# Patient Record
Sex: MALE | Race: WHITE | NOT HISPANIC OR LATINO | Employment: OTHER | URBAN - METROPOLITAN AREA
[De-identification: names, ages, dates, MRNs, and addresses within clinical notes are randomized per-mention and may not be internally consistent; named-entity substitution may affect disease eponyms.]

---

## 2018-10-24 ENCOUNTER — TELEPHONE (OUTPATIENT)
Dept: TRANSPLANT | Facility: CLINIC | Age: 53
End: 2018-10-24

## 2018-10-24 NOTE — TELEPHONE ENCOUNTER
Referral received from Richar Rosas    Patient with Alcohol cirrhosis.  MELD 33  Referred for liver transplant for EVALUATION.    Referral completed and forwarded to [facility].          Insurance:   PRIMARY: [employer]   ID:[id number]  Contact #     SECONDARY:   ID:  Contact #

## 2018-10-26 ENCOUNTER — TELEPHONE (OUTPATIENT)
Dept: TRANSPLANT | Facility: CLINIC | Age: 53
End: 2018-10-26

## 2018-10-26 NOTE — TELEPHONE ENCOUNTER
Called wife to schedule pt for his appointment on Monday 10/29. Per wife Suzy, she spoke with [facility]lon boyer and they need 5 days to arrange a flight. I questioned if he was too sick to drive. Per wife the drive is  18 hrs and he is weak. She feeds him twice a day and he has freq bowel movements daily.  I informed her if he is that sick we did not want her to drive 18 hrs with him.  I rescheduled the pt for  11/5 with Dr Sharma to allow time for wife to arrange [place] flight.  I spoke to the wife of pt possibly being admitted at home if he is too sick and the possibility of a hospital to hospital transfer if insurance will cover. She stated she too felt he should be in the hospital. I encouraged her to bring him to the hospital and I would hold the appt for 11/5 and he could come by marlon boyer then if not by hospital to hospital transfer before.  I spoke with Dr Sharma, informed him of pt status, he agreed that pt should be admitted and they could call for a transfer.    I called the wife back and informed her Dr Sharma stated if pt waited for medical care, he may be too sick to transplanted and again encouraged her to bring him to the hospital.The wife asked if they arrived, could they go to the ER. I informed her yes, they could, but again we are advising her not to drive 18 hours with him. She stated she is speaking to her daughters and they may have some resources.

## 2018-10-27 ENCOUNTER — HOSPITAL ENCOUNTER (INPATIENT)
Facility: HOSPITAL | Age: 53
LOS: 73 days | Discharge: NURSING, REHAB OR LONG-TERM CARE | DRG: 005 | End: 2019-01-08
Attending: EMERGENCY MEDICINE | Admitting: HOSPITALIST
Payer: COMMERCIAL

## 2018-10-27 DIAGNOSIS — K72.00 ACUTE LIVER FAILURE: ICD-10-CM

## 2018-10-27 DIAGNOSIS — R41.0 ACUTE DELIRIUM: ICD-10-CM

## 2018-10-27 DIAGNOSIS — K72.00 ACUTE LIVER FAILURE WITHOUT HEPATIC COMA: ICD-10-CM

## 2018-10-27 DIAGNOSIS — R07.89 OTHER CHEST PAIN: ICD-10-CM

## 2018-10-27 DIAGNOSIS — N17.0 ACUTE RENAL FAILURE WITH ACUTE TUBULAR NECROSIS SUPERIMPOSED ON STAGE 3 CHRONIC KIDNEY DISEASE: ICD-10-CM

## 2018-10-27 DIAGNOSIS — Z99.11 ENCOUNTER FOR WEANING FROM VENTILATOR: ICD-10-CM

## 2018-10-27 DIAGNOSIS — M89.9 CHRONIC KIDNEY DISEASE-MINERAL AND BONE DISORDER: ICD-10-CM

## 2018-10-27 DIAGNOSIS — R00.0 SINUS TACHYCARDIA: ICD-10-CM

## 2018-10-27 DIAGNOSIS — F10.21 ALCOHOL USE DISORDER, SEVERE, IN EARLY REMISSION: ICD-10-CM

## 2018-10-27 DIAGNOSIS — T38.0X5A ADRENAL CORTICAL STEROIDS CAUSING ADVERSE EFFECT IN THERAPEUTIC USE: ICD-10-CM

## 2018-10-27 DIAGNOSIS — E43 SEVERE MALNUTRITION: ICD-10-CM

## 2018-10-27 DIAGNOSIS — Z74.09 IMPAIRED FUNCTIONAL MOBILITY AND ENDURANCE: ICD-10-CM

## 2018-10-27 DIAGNOSIS — F10.20 SEVERE ALCOHOL DEPENDENCE: ICD-10-CM

## 2018-10-27 DIAGNOSIS — R07.9 CHEST PAIN, UNSPECIFIED TYPE: ICD-10-CM

## 2018-10-27 DIAGNOSIS — E44.0 MODERATE MALNUTRITION: ICD-10-CM

## 2018-10-27 DIAGNOSIS — I77.1 STENOSIS OF HEPATIC ARTERY OF TRANSPLANTED LIVER: ICD-10-CM

## 2018-10-27 DIAGNOSIS — E87.6 HYPOKALEMIA: ICD-10-CM

## 2018-10-27 DIAGNOSIS — R41.0 DELIRIUM: ICD-10-CM

## 2018-10-27 DIAGNOSIS — E87.5 HYPERKALEMIA: ICD-10-CM

## 2018-10-27 DIAGNOSIS — K72.10 END STAGE LIVER DISEASE: ICD-10-CM

## 2018-10-27 DIAGNOSIS — E83.42 HYPOMAGNESEMIA: ICD-10-CM

## 2018-10-27 DIAGNOSIS — N17.9 ACUTE RENAL FAILURE, UNSPECIFIED ACUTE RENAL FAILURE TYPE: ICD-10-CM

## 2018-10-27 DIAGNOSIS — Z91.89 AT RISK FOR OPPORTUNISTIC INFECTIONS: ICD-10-CM

## 2018-10-27 DIAGNOSIS — N17.0 ACUTE KIDNEY FAILURE WITH LESION OF TUBULAR NECROSIS: ICD-10-CM

## 2018-10-27 DIAGNOSIS — E87.20 METABOLIC ACIDOSIS: ICD-10-CM

## 2018-10-27 DIAGNOSIS — E83.39 HYPOPHOSPHATEMIA: ICD-10-CM

## 2018-10-27 DIAGNOSIS — N18.9 CHRONIC KIDNEY DISEASE-MINERAL AND BONE DISORDER: ICD-10-CM

## 2018-10-27 DIAGNOSIS — R17 JAUNDICE: ICD-10-CM

## 2018-10-27 DIAGNOSIS — A41.9 SEPSIS: ICD-10-CM

## 2018-10-27 DIAGNOSIS — D68.9 COAGULOPATHY: ICD-10-CM

## 2018-10-27 DIAGNOSIS — F91.9 BEHAVIOR DISTURBANCE: ICD-10-CM

## 2018-10-27 DIAGNOSIS — N17.9 AKI (ACUTE KIDNEY INJURY): ICD-10-CM

## 2018-10-27 DIAGNOSIS — Z01.818 ENCOUNTER FOR PRE-TRANSPLANT EVALUATION FOR CHRONIC LIVER DISEASE: ICD-10-CM

## 2018-10-27 DIAGNOSIS — K76.82 HEPATIC ENCEPHALOPATHY: ICD-10-CM

## 2018-10-27 DIAGNOSIS — E44.0 MODERATE PROTEIN MALNUTRITION: ICD-10-CM

## 2018-10-27 DIAGNOSIS — R11.0 NAUSEA: ICD-10-CM

## 2018-10-27 DIAGNOSIS — R06.00 DYSPNEA, UNSPECIFIED TYPE: ICD-10-CM

## 2018-10-27 DIAGNOSIS — N17.0 ATN (ACUTE TUBULAR NECROSIS): ICD-10-CM

## 2018-10-27 DIAGNOSIS — N18.30 ACUTE RENAL FAILURE WITH ACUTE TUBULAR NECROSIS SUPERIMPOSED ON STAGE 3 CHRONIC KIDNEY DISEASE: ICD-10-CM

## 2018-10-27 DIAGNOSIS — R53.1 WEAKNESS: ICD-10-CM

## 2018-10-27 DIAGNOSIS — N30.00 ACUTE CYSTITIS WITHOUT HEMATURIA: ICD-10-CM

## 2018-10-27 DIAGNOSIS — Z29.89 PROPHYLACTIC IMMUNOTHERAPY: ICD-10-CM

## 2018-10-27 DIAGNOSIS — R53.81 DEBILITY: ICD-10-CM

## 2018-10-27 DIAGNOSIS — R11.2 NON-INTRACTABLE VOMITING WITH NAUSEA, UNSPECIFIED VOMITING TYPE: ICD-10-CM

## 2018-10-27 DIAGNOSIS — R21 ACUTE MACULOPAPULAR RASH: ICD-10-CM

## 2018-10-27 DIAGNOSIS — R60.1 ANASARCA: ICD-10-CM

## 2018-10-27 DIAGNOSIS — F41.9 ANXIETY: ICD-10-CM

## 2018-10-27 DIAGNOSIS — K76.9 PLEURAL EFFUSION ASSOCIATED WITH HEPATIC DISORDER: ICD-10-CM

## 2018-10-27 DIAGNOSIS — E68 SEQUELAE OF HYPERALIMENTATION: ICD-10-CM

## 2018-10-27 DIAGNOSIS — R73.9 ACUTE HYPERGLYCEMIA: ICD-10-CM

## 2018-10-27 DIAGNOSIS — D72.829 LEUKOCYTOSIS, UNSPECIFIED TYPE: ICD-10-CM

## 2018-10-27 DIAGNOSIS — R07.9 CHEST PAIN: ICD-10-CM

## 2018-10-27 DIAGNOSIS — R94.31 PROLONGED Q-T INTERVAL ON ECG: ICD-10-CM

## 2018-10-27 DIAGNOSIS — E11.9 TYPE 2 DIABETES MELLITUS WITHOUT COMPLICATION: ICD-10-CM

## 2018-10-27 DIAGNOSIS — D63.8 ANEMIA OF CHRONIC DISEASE: ICD-10-CM

## 2018-10-27 DIAGNOSIS — E87.1 HYPONATREMIA: ICD-10-CM

## 2018-10-27 DIAGNOSIS — E11.9 TYPE 2 DIABETES MELLITUS WITHOUT COMPLICATION, WITHOUT LONG-TERM CURRENT USE OF INSULIN: ICD-10-CM

## 2018-10-27 DIAGNOSIS — F10.21 ALCOHOL USE DISORDER, SEVERE, IN EARLY REMISSION, DEPENDENCE: ICD-10-CM

## 2018-10-27 DIAGNOSIS — E83.9 CHRONIC KIDNEY DISEASE-MINERAL AND BONE DISORDER: ICD-10-CM

## 2018-10-27 DIAGNOSIS — R13.19 OTHER DYSPHAGIA: ICD-10-CM

## 2018-10-27 DIAGNOSIS — K65.1 INTRA-ABDOMINAL ABSCESS: ICD-10-CM

## 2018-10-27 DIAGNOSIS — K70.11 ALCOHOLIC HEPATITIS WITH ASCITES: ICD-10-CM

## 2018-10-27 DIAGNOSIS — T86.49 STENOSIS OF HEPATIC ARTERY OF TRANSPLANTED LIVER: ICD-10-CM

## 2018-10-27 DIAGNOSIS — K72.90 DECOMPENSATED HEPATIC CIRRHOSIS: ICD-10-CM

## 2018-10-27 DIAGNOSIS — R63.0 DECREASED APPETITE: ICD-10-CM

## 2018-10-27 DIAGNOSIS — E66.3 OVERWEIGHT (BMI 25.0-29.9): ICD-10-CM

## 2018-10-27 DIAGNOSIS — I35.9 NONRHEUMATIC AORTIC VALVE DISORDER: ICD-10-CM

## 2018-10-27 DIAGNOSIS — K76.7 HEPATORENAL SYNDROME: ICD-10-CM

## 2018-10-27 DIAGNOSIS — Z79.60 LONG-TERM USE OF IMMUNOSUPPRESSANT MEDICATION: ICD-10-CM

## 2018-10-27 DIAGNOSIS — K74.60 DECOMPENSATED HEPATIC CIRRHOSIS: ICD-10-CM

## 2018-10-27 DIAGNOSIS — J91.8 PLEURAL EFFUSION ASSOCIATED WITH HEPATIC DISORDER: ICD-10-CM

## 2018-10-27 DIAGNOSIS — Z01.818 PRE-TRANSPLANT EVALUATION FOR LIVER TRANSPLANT: ICD-10-CM

## 2018-10-27 DIAGNOSIS — G93.40 ACUTE ENCEPHALOPATHY: ICD-10-CM

## 2018-10-27 DIAGNOSIS — Z94.4 S/P LIVER TRANSPLANT: Primary | ICD-10-CM

## 2018-10-27 DIAGNOSIS — D69.6 THROMBOCYTOPENIA: ICD-10-CM

## 2018-10-27 LAB
ALBUMIN SERPL BCP-MCNC: 1.9 G/DL
ALP SERPL-CCNC: 255 U/L
ALT SERPL W/O P-5'-P-CCNC: 84 U/L
AMPHET+METHAMPHET UR QL: NEGATIVE
ANION GAP SERPL CALC-SCNC: 13 MMOL/L
AST SERPL-CCNC: 107 U/L
BACTERIA #/AREA URNS AUTO: ABNORMAL /HPF
BARBITURATES UR QL SCN>200 NG/ML: NEGATIVE
BASOPHILS # BLD AUTO: 0.07 K/UL
BASOPHILS NFR BLD: 0.4 %
BENZODIAZ UR QL SCN>200 NG/ML: NORMAL
BILIRUB SERPL-MCNC: 40.9 MG/DL
BILIRUB UR QL STRIP: ABNORMAL
BUN SERPL-MCNC: 113 MG/DL
BZE UR QL SCN: NEGATIVE
CALCIUM SERPL-MCNC: 8 MG/DL
CANNABINOIDS UR QL SCN: NEGATIVE
CERULOPLASMIN SERPL-MCNC: 31 MG/DL
CHLORIDE SERPL-SCNC: 104 MMOL/L
CLARITY UR REFRACT.AUTO: ABNORMAL
CO2 SERPL-SCNC: 11 MMOL/L
COLOR UR AUTO: ABNORMAL
CREAT SERPL-MCNC: 6 MG/DL
CREAT UR-MCNC: 117 MG/DL
DIFFERENTIAL METHOD: ABNORMAL
EOSINOPHIL # BLD AUTO: 0.3 K/UL
EOSINOPHIL NFR BLD: 1.8 %
EOSINOPHIL URNS QL WRIGHT STN: NORMAL
ERYTHROCYTE [DISTWIDTH] IN BLOOD BY AUTOMATED COUNT: 19.3 %
EST. GFR  (AFRICAN AMERICAN): 11.3 ML/MIN/1.73 M^2
EST. GFR  (NON AFRICAN AMERICAN): 9.8 ML/MIN/1.73 M^2
ESTIMATED AVG GLUCOSE: 91 MG/DL
FERRITIN SERPL-MCNC: 1044 NG/ML
FIBRINOGEN PPP-MCNC: 256 MG/DL
FOLATE SERPL-MCNC: 16.4 NG/ML
GLUCOSE SERPL-MCNC: 149 MG/DL
GLUCOSE UR QL STRIP: NEGATIVE
HAPTOGLOB SERPL-MCNC: 61 MG/DL
HBA1C MFR BLD HPLC: 4.8 %
HCT VFR BLD AUTO: 40.1 %
HGB BLD-MCNC: 13.4 G/DL
HGB UR QL STRIP: NEGATIVE
HYALINE CASTS UR QL AUTO: 1 /LPF
IGG SERPL-MCNC: 2065 MG/DL
IMM GRANULOCYTES # BLD AUTO: 0.2 K/UL
IMM GRANULOCYTES NFR BLD AUTO: 1.3 %
INR PPP: 2.2
IRON SERPL-MCNC: 101 UG/DL
KETONES UR QL STRIP: NEGATIVE
LACTATE SERPL-SCNC: 1.4 MMOL/L
LDH SERPL L TO P-CCNC: 394 U/L
LEUKOCYTE ESTERASE UR QL STRIP: ABNORMAL
LYMPHOCYTES # BLD AUTO: 0.7 K/UL
LYMPHOCYTES NFR BLD: 4.2 %
MAGNESIUM SERPL-MCNC: 2.6 MG/DL
MCH RBC QN AUTO: 33.3 PG
MCHC RBC AUTO-ENTMCNC: 33.4 G/DL
MCV RBC AUTO: 100 FL
METHADONE UR QL SCN>300 NG/ML: NEGATIVE
MICROSCOPIC COMMENT: ABNORMAL
MONOCYTES # BLD AUTO: 1.4 K/UL
MONOCYTES NFR BLD: 9.1 %
NEUTROPHILS # BLD AUTO: 13.2 K/UL
NEUTROPHILS NFR BLD: 83.2 %
NITRITE UR QL STRIP: NEGATIVE
NRBC BLD-RTO: 0 /100 WBC
OPIATES UR QL SCN: NEGATIVE
OSMOLALITY SERPL: 320 MOSM/KG
OSMOLALITY UR: 363 MOSM/KG
PCP UR QL SCN>25 NG/ML: NEGATIVE
PH UR STRIP: 5 [PH] (ref 5–8)
PLATELET # BLD AUTO: 123 K/UL
PMV BLD AUTO: 11.8 FL
POCT GLUCOSE: 129 MG/DL (ref 70–110)
POCT GLUCOSE: 133 MG/DL (ref 70–110)
POTASSIUM SERPL-SCNC: 4.6 MMOL/L
PREALB SERPL-MCNC: 7 MG/DL
PROCALCITONIN SERPL IA-MCNC: 2.7 NG/ML
PROT SERPL-MCNC: 6.2 G/DL
PROT UR QL STRIP: NEGATIVE
PROT UR-MCNC: 31 MG/DL
PROT/CREAT UR: 0.26 MG/G{CREAT}
PROTHROMBIN TIME: 21.4 SEC
RBC # BLD AUTO: 4.03 M/UL
RBC #/AREA URNS AUTO: 1 /HPF (ref 0–4)
SATURATED IRON: 103 %
SODIUM SERPL-SCNC: 128 MMOL/L
SODIUM UR-SCNC: <20 MMOL/L
SODIUM UR-SCNC: <20 MMOL/L
SP GR UR STRIP: 1.01 (ref 1–1.03)
TOTAL IRON BINDING CAPACITY: 98 UG/DL
TOXICOLOGY INFORMATION: NORMAL
TRANSFERRIN SERPL-MCNC: 66 MG/DL
URN SPEC COLLECT METH UR: ABNORMAL
UUN UR-MCNC: 533 MG/DL
VIT B12 SERPL-MCNC: >2000 PG/ML
WBC # BLD AUTO: 15.8 K/UL
WBC #/AREA URNS AUTO: 22 /HPF (ref 0–5)

## 2018-10-27 PROCEDURE — 63600175 PHARM REV CODE 636 W HCPCS: Mod: NTX | Performed by: HOSPITALIST

## 2018-10-27 PROCEDURE — 85025 COMPLETE CBC W/AUTO DIFF WBC: CPT | Mod: NTX

## 2018-10-27 PROCEDURE — 25000003 PHARM REV CODE 250: Mod: NTX | Performed by: INTERNAL MEDICINE

## 2018-10-27 PROCEDURE — 99223 1ST HOSP IP/OBS HIGH 75: CPT | Mod: NTX,,, | Performed by: INTERNAL MEDICINE

## 2018-10-27 PROCEDURE — 80074 ACUTE HEPATITIS PANEL: CPT | Mod: NTX

## 2018-10-27 PROCEDURE — 96365 THER/PROPH/DIAG IV INF INIT: CPT | Mod: NTX

## 2018-10-27 PROCEDURE — 85610 PROTHROMBIN TIME: CPT | Mod: NTX

## 2018-10-27 PROCEDURE — 82728 ASSAY OF FERRITIN: CPT | Mod: NTX

## 2018-10-27 PROCEDURE — 84145 PROCALCITONIN (PCT): CPT | Mod: NTX

## 2018-10-27 PROCEDURE — 82607 VITAMIN B-12: CPT | Mod: NTX

## 2018-10-27 PROCEDURE — 25000242 PHARM REV CODE 250 ALT 637 W/ HCPCS: Mod: NTX | Performed by: HOSPITALIST

## 2018-10-27 PROCEDURE — 87086 URINE CULTURE/COLONY COUNT: CPT | Mod: NTX

## 2018-10-27 PROCEDURE — 63600175 PHARM REV CODE 636 W HCPCS: Mod: JG,NTX | Performed by: HOSPITALIST

## 2018-10-27 PROCEDURE — 93005 ELECTROCARDIOGRAM TRACING: CPT | Mod: NTX

## 2018-10-27 PROCEDURE — 87077 CULTURE AEROBIC IDENTIFY: CPT | Mod: NTX

## 2018-10-27 PROCEDURE — 83735 ASSAY OF MAGNESIUM: CPT | Mod: NTX

## 2018-10-27 PROCEDURE — 25000003 PHARM REV CODE 250: Mod: NTX | Performed by: HOSPITALIST

## 2018-10-27 PROCEDURE — 99223 PR INITIAL HOSPITAL CARE,LEVL III: ICD-10-PCS | Mod: NTX,,, | Performed by: INTERNAL MEDICINE

## 2018-10-27 PROCEDURE — 99285 EMERGENCY DEPT VISIT HI MDM: CPT | Mod: 25,NTX

## 2018-10-27 PROCEDURE — 87186 SC STD MICRODIL/AGAR DIL: CPT | Mod: NTX

## 2018-10-27 PROCEDURE — 20600001 HC STEP DOWN PRIVATE ROOM: Mod: NTX

## 2018-10-27 PROCEDURE — 99284 EMERGENCY DEPT VISIT MOD MDM: CPT | Mod: ,,, | Performed by: EMERGENCY MEDICINE

## 2018-10-27 PROCEDURE — 87088 URINE BACTERIA CULTURE: CPT | Mod: NTX

## 2018-10-27 PROCEDURE — 25000003 PHARM REV CODE 250: Mod: NTX | Performed by: NURSE PRACTITIONER

## 2018-10-27 PROCEDURE — 82390 ASSAY OF CERULOPLASMIN: CPT | Mod: NTX

## 2018-10-27 PROCEDURE — 83036 HEMOGLOBIN GLYCOSYLATED A1C: CPT | Mod: NTX

## 2018-10-27 PROCEDURE — 83010 ASSAY OF HAPTOGLOBIN QUANT: CPT | Mod: NTX

## 2018-10-27 PROCEDURE — P9047 ALBUMIN (HUMAN), 25%, 50ML: HCPCS | Mod: JG,NTX | Performed by: HOSPITALIST

## 2018-10-27 PROCEDURE — 83935 ASSAY OF URINE OSMOLALITY: CPT | Mod: NTX

## 2018-10-27 PROCEDURE — 82570 ASSAY OF URINE CREATININE: CPT | Mod: NTX

## 2018-10-27 PROCEDURE — 81001 URINALYSIS AUTO W/SCOPE: CPT | Mod: NTX

## 2018-10-27 PROCEDURE — 83540 ASSAY OF IRON: CPT | Mod: NTX

## 2018-10-27 PROCEDURE — 84540 ASSAY OF URINE/UREA-N: CPT | Mod: NTX

## 2018-10-27 PROCEDURE — 84300 ASSAY OF URINE SODIUM: CPT | Mod: NTX

## 2018-10-27 PROCEDURE — 80307 DRUG TEST PRSMV CHEM ANLYZR: CPT | Mod: NTX

## 2018-10-27 PROCEDURE — 96375 TX/PRO/DX INJ NEW DRUG ADDON: CPT | Mod: NTX

## 2018-10-27 PROCEDURE — 99284 PR EMERGENCY DEPT VISIT,LEVEL IV: ICD-10-PCS | Mod: ,,, | Performed by: EMERGENCY MEDICINE

## 2018-10-27 PROCEDURE — 87205 SMEAR GRAM STAIN: CPT | Mod: NTX

## 2018-10-27 PROCEDURE — 83615 LACTATE (LD) (LDH) ENZYME: CPT | Mod: NTX

## 2018-10-27 PROCEDURE — 99223 1ST HOSP IP/OBS HIGH 75: CPT | Mod: NTX,,, | Performed by: HOSPITALIST

## 2018-10-27 PROCEDURE — 87040 BLOOD CULTURE FOR BACTERIA: CPT | Mod: 59,NTX

## 2018-10-27 PROCEDURE — 84134 ASSAY OF PREALBUMIN: CPT | Mod: NTX

## 2018-10-27 PROCEDURE — 82746 ASSAY OF FOLIC ACID SERUM: CPT | Mod: NTX

## 2018-10-27 PROCEDURE — 80321 ALCOHOLS BIOMARKERS 1OR 2: CPT | Mod: NTX

## 2018-10-27 PROCEDURE — 86038 ANTINUCLEAR ANTIBODIES: CPT | Mod: NTX

## 2018-10-27 PROCEDURE — 86256 FLUORESCENT ANTIBODY TITER: CPT | Mod: NTX

## 2018-10-27 PROCEDURE — 80053 COMPREHEN METABOLIC PANEL: CPT | Mod: NTX

## 2018-10-27 PROCEDURE — 83930 ASSAY OF BLOOD OSMOLALITY: CPT | Mod: NTX

## 2018-10-27 PROCEDURE — 99223 PR INITIAL HOSPITAL CARE,LEVL III: ICD-10-PCS | Mod: NTX,,, | Performed by: HOSPITALIST

## 2018-10-27 PROCEDURE — 83605 ASSAY OF LACTIC ACID: CPT | Mod: NTX

## 2018-10-27 PROCEDURE — 85384 FIBRINOGEN ACTIVITY: CPT | Mod: NTX

## 2018-10-27 PROCEDURE — 82784 ASSAY IGA/IGD/IGG/IGM EACH: CPT | Mod: NTX

## 2018-10-27 PROCEDURE — 94640 AIRWAY INHALATION TREATMENT: CPT | Mod: NTX

## 2018-10-27 RX ORDER — FOLIC ACID 1 MG/1
1 TABLET ORAL DAILY
Status: DISCONTINUED | OUTPATIENT
Start: 2018-10-28 | End: 2018-11-11

## 2018-10-27 RX ORDER — LIDOCAINE 50 MG/G
1 PATCH TOPICAL
Status: DISCONTINUED | OUTPATIENT
Start: 2018-10-27 | End: 2018-11-11

## 2018-10-27 RX ORDER — IBUPROFEN 200 MG
16 TABLET ORAL
Status: DISCONTINUED | OUTPATIENT
Start: 2018-10-27 | End: 2018-11-11

## 2018-10-27 RX ORDER — IPRATROPIUM BROMIDE AND ALBUTEROL SULFATE 2.5; .5 MG/3ML; MG/3ML
3 SOLUTION RESPIRATORY (INHALATION)
Status: DISCONTINUED | OUTPATIENT
Start: 2018-10-27 | End: 2018-11-05

## 2018-10-27 RX ORDER — ONDANSETRON 8 MG/1
8 TABLET, ORALLY DISINTEGRATING ORAL EVERY 6 HOURS PRN
Status: DISCONTINUED | OUTPATIENT
Start: 2018-10-27 | End: 2018-11-11

## 2018-10-27 RX ORDER — TRAMADOL HYDROCHLORIDE 50 MG/1
50 TABLET ORAL EVERY 8 HOURS PRN
Status: DISCONTINUED | OUTPATIENT
Start: 2018-10-27 | End: 2018-11-11

## 2018-10-27 RX ORDER — IBUPROFEN 200 MG
24 TABLET ORAL
Status: DISCONTINUED | OUTPATIENT
Start: 2018-10-27 | End: 2018-10-27

## 2018-10-27 RX ORDER — SODIUM BICARBONATE 650 MG/1
1300 TABLET ORAL 3 TIMES DAILY
Status: DISCONTINUED | OUTPATIENT
Start: 2018-10-27 | End: 2018-11-08

## 2018-10-27 RX ORDER — LACTULOSE 10 G/15ML
20 SOLUTION ORAL EVERY 6 HOURS
Status: DISCONTINUED | OUTPATIENT
Start: 2018-10-27 | End: 2018-10-28

## 2018-10-27 RX ORDER — CEFEPIME HYDROCHLORIDE 2 G/1
2 INJECTION, POWDER, FOR SOLUTION INTRAVENOUS
Status: DISCONTINUED | OUTPATIENT
Start: 2018-10-27 | End: 2018-10-30

## 2018-10-27 RX ORDER — OCTREOTIDE ACETATE 100 UG/ML
100 INJECTION, SOLUTION INTRAVENOUS; SUBCUTANEOUS EVERY 8 HOURS
Status: DISCONTINUED | OUTPATIENT
Start: 2018-10-27 | End: 2018-10-30

## 2018-10-27 RX ORDER — ACETAMINOPHEN 325 MG/1
650 TABLET ORAL EVERY 4 HOURS PRN
Status: DISCONTINUED | OUTPATIENT
Start: 2018-10-27 | End: 2018-11-11

## 2018-10-27 RX ORDER — SODIUM CHLORIDE 9 MG/ML
INJECTION, SOLUTION INTRAVENOUS CONTINUOUS
Status: ACTIVE | OUTPATIENT
Start: 2018-10-27 | End: 2018-10-28

## 2018-10-27 RX ORDER — IBUPROFEN 200 MG
16 TABLET ORAL
Status: DISCONTINUED | OUTPATIENT
Start: 2018-10-27 | End: 2018-10-27

## 2018-10-27 RX ORDER — PANTOPRAZOLE SODIUM 40 MG/1
40 TABLET, DELAYED RELEASE ORAL DAILY
Status: DISCONTINUED | OUTPATIENT
Start: 2018-10-28 | End: 2018-11-11

## 2018-10-27 RX ORDER — GLUCAGON 1 MG
1 KIT INJECTION
Status: DISCONTINUED | OUTPATIENT
Start: 2018-10-27 | End: 2018-10-27

## 2018-10-27 RX ORDER — SODIUM CHLORIDE 0.9 % (FLUSH) 0.9 %
5 SYRINGE (ML) INJECTION
Status: DISCONTINUED | OUTPATIENT
Start: 2018-10-27 | End: 2018-11-11

## 2018-10-27 RX ORDER — GLUCAGON 1 MG
1 KIT INJECTION
Status: DISCONTINUED | OUTPATIENT
Start: 2018-10-27 | End: 2018-11-11

## 2018-10-27 RX ORDER — MIDODRINE HYDROCHLORIDE 5 MG/1
10 TABLET ORAL 3 TIMES DAILY
Status: DISCONTINUED | OUTPATIENT
Start: 2018-10-27 | End: 2018-11-01

## 2018-10-27 RX ORDER — VANCOMYCIN HCL IN 5 % DEXTROSE 1G/250ML
1000 PLASTIC BAG, INJECTION (ML) INTRAVENOUS ONCE
Status: COMPLETED | OUTPATIENT
Start: 2018-10-27 | End: 2018-10-27

## 2018-10-27 RX ORDER — THIAMINE HCL 100 MG
100 TABLET ORAL DAILY
Status: DISCONTINUED | OUTPATIENT
Start: 2018-10-27 | End: 2018-11-11

## 2018-10-27 RX ORDER — INSULIN ASPART 100 [IU]/ML
0-5 INJECTION, SOLUTION INTRAVENOUS; SUBCUTANEOUS
Status: DISCONTINUED | OUTPATIENT
Start: 2018-10-27 | End: 2018-11-11

## 2018-10-27 RX ORDER — ALBUMIN HUMAN 250 G/1000ML
25 SOLUTION INTRAVENOUS EVERY 6 HOURS
Status: COMPLETED | OUTPATIENT
Start: 2018-10-27 | End: 2018-10-28

## 2018-10-27 RX ORDER — IBUPROFEN 200 MG
24 TABLET ORAL
Status: DISCONTINUED | OUTPATIENT
Start: 2018-10-27 | End: 2018-11-11

## 2018-10-27 RX ADMIN — PHYTONADIONE 10 MG: 10 INJECTION, EMULSION INTRAMUSCULAR; INTRAVENOUS; SUBCUTANEOUS at 06:10

## 2018-10-27 RX ADMIN — SODIUM BICARBONATE 650 MG TABLET 1300 MG: at 09:10

## 2018-10-27 RX ADMIN — SODIUM CHLORIDE 1000 ML: 0.9 INJECTION, SOLUTION INTRAVENOUS at 05:10

## 2018-10-27 RX ADMIN — OCTREOTIDE ACETATE 100 MCG: 100 INJECTION, SOLUTION INTRAVENOUS; SUBCUTANEOUS at 05:10

## 2018-10-27 RX ADMIN — LIDOCAINE 1 PATCH: 50 PATCH CUTANEOUS at 09:10

## 2018-10-27 RX ADMIN — MIDODRINE HYDROCHLORIDE 10 MG: 5 TABLET ORAL at 04:10

## 2018-10-27 RX ADMIN — ALBUMIN HUMAN 25 G: 0.25 SOLUTION INTRAVENOUS at 05:10

## 2018-10-27 RX ADMIN — CEFEPIME 2 G: 2 INJECTION, POWDER, FOR SOLUTION INTRAVENOUS at 05:10

## 2018-10-27 RX ADMIN — SODIUM BICARBONATE 650 MG TABLET 1300 MG: at 05:10

## 2018-10-27 RX ADMIN — PROMETHAZINE HYDROCHLORIDE 12.5 MG: 25 INJECTION INTRAMUSCULAR; INTRAVENOUS at 10:10

## 2018-10-27 RX ADMIN — LACTULOSE 20 G: 20 SOLUTION ORAL at 05:10

## 2018-10-27 RX ADMIN — SODIUM CHLORIDE: 0.9 INJECTION, SOLUTION INTRAVENOUS at 09:10

## 2018-10-27 RX ADMIN — IPRATROPIUM BROMIDE AND ALBUTEROL SULFATE 3 ML: .5; 3 SOLUTION RESPIRATORY (INHALATION) at 07:10

## 2018-10-27 RX ADMIN — Medication 100 MG: at 06:10

## 2018-10-27 RX ADMIN — OCTREOTIDE ACETATE 100 MCG: 100 INJECTION, SOLUTION INTRAVENOUS; SUBCUTANEOUS at 09:10

## 2018-10-27 RX ADMIN — MIDODRINE HYDROCHLORIDE 10 MG: 5 TABLET ORAL at 10:10

## 2018-10-27 RX ADMIN — RIFAXIMIN 550 MG: 550 TABLET ORAL at 09:10

## 2018-10-27 RX ADMIN — VANCOMYCIN HYDROCHLORIDE 1000 MG: 1 INJECTION, POWDER, LYOPHILIZED, FOR SOLUTION INTRAVENOUS at 06:10

## 2018-10-27 NOTE — ED TRIAGE NOTES
Pt. Arrived to ED with CC of ongoing N/V, and generalized body aches since 9/30/18. Denies CP and SOB    Patient identifiers verified and correct for Femi Enciso.    LOC: The patient is awake, alert and oriented x 4. Pt is speaking appropriately, no slurred speech.  APPEARANCE: Patient resting comfortably and in no acute distress. Pt is clean and well groomed. No JVD visible. Pt reports pain level of 10/10.  SKIN: Skin is warm dry and intact, an jaundice due to liver problem.  No tenting observed and capillary refill <3 seconds. No clubbing noted to nail beds. No breakdown or brusing visible and mucus membranes moist and acyanotic.  MUSCULOSKELETAL: Full range of motion present in all extremities. Hand  equal and leg strength strong +5 bilaterally.  RESPIRATORY: Airway is open and patent. Respirations-unlabored, regular rate, equal bilaterally on inspiration and expiration. No accessory muscle use noted. Lungs clear to auscultation in all fields bilaterally anterior and posterior.   CARDIAC: Patient has regular heart rate and rhythm.  No peripheral edema noted, and patient has no c/o chest pain.  ABDOMEN: Soft and non-tender to palpation with no distention noted. Normoactive bowel sounds X4 quadrants. Pt has no complaints of abnormal bowel movements. Pt reports normal appetite.   NEUROLOGIC: Eyes open spontaneously and facial expression symmetrical. Pt behavior appropriate to situation, and pt follows commands.  Pt reports sensation present in all extremities when touched with a finger.. PERRLA  : No complaints of frequency, burning, urgency or blood in the urine.

## 2018-10-27 NOTE — CONSULTS
[facility]  Nephrology  Consult Note     Patient Name: Femi Enciso  MRN: [id number]  Admission Date: 10/27/2018  Hospital Length of Stay: 0 days  Attending Provider: Hai Wylie MD   Primary Care Physician: Primary Doctor No  Principal Problem:Acute liver failure without hepatic coma     Subjective:      HPI: 54 y/o man with DM2 presents to the ED with family for liver failure (likely due to EtOH abundant history of drinking - diagnosed in Sept 2018 in [place]).  He reports jaundice, generalized weakness, nausea, diarrhea, and decreased appetite since Sept 2018.  He is from [place], [place], and Dr. Sharma (patients physician) recommended bringing him to hospital for evaluation.  Patient denies any fever, chills, vomiting, chest pain, palpitations, SOB, abdominal pain.       Nephrology consulted for evaluation/management Adri.           Past Medical History:   Diagnosis Date    Liver cirrhosis, alcoholic 09/30/2018         No past surgical history on file.          Review of patient's allergies indicates:   Allergen Reactions    Penicillins Nausea And Vomiting and Rash           Current Facility-Administered Medications   Medication Frequency    acetaminophen tablet 650 mg Q4H PRN    albumin human 25% bottle 25 g Q6H    ceFEPIme injection 2 g Q24H    dextrose 50% injection 12.5 g PRN    dextrose 50% injection 25 g PRN    glucagon (human recombinant) injection 1 mg PRN    glucose chewable tablet 16 g PRN    glucose chewable tablet 24 g PRN    lactulose 20 gram/30 mL solution Soln 20 g Q6H    midodrine tablet 10 mg TID    octreotide injection 100 mcg Q8H    phytonadione vitamin k (AQUA-MEPHYTON) 10 mg in dextrose 5 % 50 mL IVPB Daily    promethazine (PHENERGAN) 6.25 mg in dextrose 5 % 50 mL IVPB Q6H PRN    sodium bicarbonate tablet 1,300 mg TID    sodium chloride 0.9% flush 5 mL PRN    vancomycin in dextrose 5 % 1 gram/250 mL IVPB 1,000 mg Once      No current outpatient medications on  file.          Family History      None               Tobacco Use    Smoking status: Not on file   Substance and Sexual Activity    Alcohol use: Not on file    Drug use: Not on file    Sexual activity: Not on file      Review of Systems   Constitutional: Negative for chills, fever. Positive for fatigue.  HENT: Negative for sore throat, trouble swallowing.    Eyes: Negative for photophobia, visual disturbance.   Respiratory: Negative for cough, shortness of breath.    Cardiovascular: Negative for chest pain, palpitations, leg swelling.   Gastrointestinal: Negative for abdominal pain, constipation, vomiting. Positive for nausea and diarrhea.  Endocrine: Negative for cold intolerance, heat intolerance.   Genitourinary: Negative for dysuria, frequency.   Musculoskeletal: Negative for arthralgias, myalgias.   Skin: Negative for rash, wound, erythema   Neurological: Negative for dizziness, syncope, weakness, light-headedness.   Psychiatric/Behavioral: Negative for confusion, hallucinations, anxiety  All other systems reviewed and are negative.        Objective:      Vital Signs (Most Recent):  Temp: 97.2 °F (36.2 °C) (10/27/18 1639)  Pulse: 72 (10/27/18 1530)  Resp: 17 (10/27/18 1530)  BP: 111/66 (10/27/18 1530)  SpO2: 95 % (10/27/18 1530) Vital Signs (24h Range):  Temp:  [97.2 °F (36.2 °C)-97.4 °F (36.3 °C)] 97.2 °F (36.2 °C)  Pulse:  [71-72] 72  Resp:  [17-18] 17  SpO2:  [95 %-98 %] 95 %  BP: ()/(53-67) 111/66      Weight: 79.4 kg (175 lb) (10/27/18 1353)  Body mass index is 25.11 kg/m².  Body surface area is 1.98 meters squared.     No intake/output data recorded.     Physical Exam  Constitutional: appears weak and ill   Head: Normocephalic and atraumatic.   Mouth/Throat: Oropharynx is clear and moist.   Eyes: EOM are normal. Pupils are equal, round, and reactive to light. positive scleral icterus.   Neck: Normal range of motion. Neck supple.   Cardiovascular: Normal rate and regular rhythm.  No murmur  heard.  Pulmonary/Chest: Effort normal and breath sounds normal. No respiratory distress. No wheezes, rales, or rhonchi  Abdominal: Soft. Bowel sounds are normal.  No distension or tenderness  Musculoskeletal: Normal range of motion. No edema.   Neurological: Alert and oriented to person, place, and time.   Skin: Skin is warm and dry.   Psychiatric: Normal mood and affect. Behavior is normal.   Nurse notes and vitals reviewed.     Significant Labs:  CBC:       Recent Labs   Lab 10/27/18  1457   WBC 15.80*   RBC 4.03*   HGB 13.4*   HCT 40.1   *   *   MCH 33.3*   MCHC 33.4      CMP:       Recent Labs   Lab 10/27/18  1457   *   CALCIUM 8.0*   ALBUMIN 1.9*   PROT 6.2   *   K 4.6   CO2 11*      *   CREATININE 6.0*   ALKPHOS 255*   ALT 84*   *   BILITOT 40.9*      All labs within the past 24 hours have been reviewed.     Significant Imaging:  CXR personally reviewed.     Assessment/Plan:          DEL (acute kidney injury)     Patient with elevated creatinine (unknown baseline), multiple etiologies including Hepatorenal syndrome (in setting of hyponetremia) and low to normal blood pressures.  Also patient presents with history of diarrhea (volume depleted state) which could cause ischemic ATN.     Plan:  - Please consider IV replenish 0.9%NSS to run by IV pump at 100 mL/hr for 500 mL and reassess (for volume expansion)  - Can consider Albumin admin 25% q 6 hrs x 4 doses and reassess in setting of low albumin  - U/A, UPCr, Renal US  - Will perform Urine Sediment for further analysis in AM  - Strict I/O and chart  - Avoid nephrotoxic medications  - Maintain MAP >65  - Hb > 7 gm/dL  - Medication doses adjusted to GFR               Thank you for your consult. I will follow-up with patient. Please contact us if you have any additional questions.     Charanjit Swann MD  Nephrology  [facility]    Staff attestation    I have personally examined patient,  reviewed his available medical chart, including serial labs, imaging studies, outside labs, pertinent medical chart. I agree with history and physical, assessment and plan and findings documented by Dr. العلي with my additional comments/ clarification as follows.    I obtained information from pt's daughter who was at the bedside. Patient has diabetes with prior noted A1C of up to 13, hypertension. He has longstanding ETOH abuse. He has been sick for a little over a month now and his illness appeared somewhat abruptly after eating outside food. He had several days of nausea, vomiting and was admitted to a local hospital at [place]. Per care everywhere he had creatinine between 0.9 to 2.01 while he was there and was 2.01 on 10/22/18 when he left the hospital. He also had multiple electrolyte abnormalities. He has been diagnosed with decompensated liver failure due to cirrhosis. His total bilirubin has been in the range of 35 to 40's. He has been hypotensive for the past several days per his family. He is making some urine but less than usual. Also he has encephalopathy. He has arrived at [facility] for liver transplant evaluation.     Nephrology team has been consulted for DEL. His labs here since admission noted for creatinine of 6.0 and today 6.3 with BUN of 113 at the time of presentation to BUN of 123 today. He has severe hyperphosphatemia. He also has severely low serum bicarbonate, he does not have a blood gas but suspect metabolic acidosis.     He received IVF overnight but has had only 350 cc urine last night and none reported today.     US kidneys noted for mild cortical thinning.    On exam, he is in altered mental status, somnolent, has asterixis, very ill appearing and jaundiced man. He has trace edema. CXR shows moderate right sided pleural effusion.    Impression  DEL due to toxic ATN/ ischemic ATN  Suspect underlying CKD due to hypertension and previously poorly controlled diabetes although labs prior  to 10/1/18 not available for me to review. But US does show some cortical thinning.  Abnormal urinalysis, rule out UTI  Hyperphosphatemia  Decompensated liver failure  Hyponatremia  Right pleural effusion  Metabolic acidosis    - very ill for floor stay and for dialysis on the floor. He has significant burden of uremic toxins. He needs very gentle clearance of uremic toxins. I had a detailed discussion of this with his daughter and wife at his bedside. He has toxic and ischemic ATN, although HRS is still a possibility there is still a large component of ATN.   - recommend starting SLED for gentle metabolic toxin clearance  - details of SLED discussed with house staff, 6 to 8 hours initially with labs every 2-3 hours to avoid over correction of hyponatremia and uremic toxins   - consider ABG  - gradually build time of SLED for uremic toxin and volume clearance  - work up of liver failure in progress

## 2018-10-27 NOTE — H&P
History and Physical  Hospital Medicine       Patient Name: Femi Enciso  MRN:  [id number]  Hospital Medicine Team: [facility] MED SHARRON Fitch MD  Date of Admission:  10/27/2018     Principal Problem:  Acute liver failure without hepatic coma   Primary Care Physician: Primary Doctor No      History of Present Illness:     Mr. Femi Enciso is a 53 y.o. male with PMH of alcohol hepatitis, DM2 and depression who flew here from [place] today for acute liver failure and for liver transplant evaluation.  Patient's wife states that he was in fair health until the end of September when they were on vacation.  She states that after eating a sand which, patient became very ill and started having diarrhea and N/V for several days.  Eventually that cleared up, however patient became jaundice and went to go see his GI doctor and was found to have elevated LFTs and patient was hypoxic and was admitted to their local hospital.  Patient had a large right sided effusion which was 1L was drained and negative for cancer, however was treated for a PNA.  Patient had a non contrasted abdominal CT at the time which showed multiple nodules which they thought was possibly cancer at the time.  Over the next few weeks, patient's LFTs continued to rise and patient began losing weight and started having episodes of confusion.  Patient was re-admitted this past week and Cr began to rise.  Patient was told to come here for a liver transplant evaluation.    In terms of his drinking, patient has been a binge drinker for quite some time now as per his wife.  His drink of choice is vodka and he would consume about a 5th a day.  Patient has failed multiple rehab attempts and has been to several AA meetings as well.  His longest period of sobriety was 3 years.  Patient is not currently in any withdrawals.  Patient has never had a DUI and is a retired .      Patient's wife and daughter are both RNs.      Review of Systems  "  Constitutional: Negative for chills, fever. 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   Respiratory: Negative for cough, shortness of breath.    Cardiovascular: Negative for chest pain, palpitations, leg swelling.   Gastrointestinal: Negative for abdominal pain, constipation, positive diarrhea, positive nausea, no vomiting.   Endocrine: Negative for cold intolerance, heat intolerance.   Genitourinary: Negative for dysuria, frequency.   Musculoskeletal: Negative for arthralgias, myalgias.   Skin: Negative forwound, erythema positive for color change (yellow)  Neurological: Negative for dizziness, syncope, weakness, light-headedness.   Psychiatric/Behavioral: Negative for hallucinations, anxiety positive for confusion  All other systems reviewed and are negative.      Past Medical History: Patient has a past medical history of Liver cirrhosis, alcoholic (09/30/2018).    Past Surgical History: Patient has no past surgical history on file.    Social History: Patient has a long history of ETOH abuse    Family History: HTN    Medications at home:     Allergies: Patient is allergic to penicillins.    Physical Exam:     Vital Signs (Most Recent):  Temp: 98 °F (36.7 °C) (10/27/18 1842)  Pulse: 76 (10/27/18 1955)  Resp: 18 (10/27/18 1955)  BP: 123/67 (10/27/18 1930)  SpO2: 98 % (10/27/18 1955) Vital Signs Range (Last 24H):  Temp:  [97.2 °F (36.2 °C)-98 °F (36.7 °C)]   Pulse:  [70-78]   Resp:  [16-19]   BP: ()/(53-88)   SpO2:  [95 %-100 %]    Body mass index is 25.11 kg/m².     Physical Exam:  Constitutional: appears weak and ill   Head: Normocephalic and atraumatic.   Mouth/Throat: Oropharynx is clear with dry mucous membranes  Eyes: EOM are normal. Pupils are equal, round, and reactive to light. positive scleral icterus.   Neck: Normal range of motion. Neck supple.   Cardiovascular: Normal " rate and regular rhythm.  Soft murmur  Pulmonary/Chest: Effort normal and decreased BS on right side; No respiratory distress. No wheezes, rales, or rhonchi  Abdominal: Soft. Bowel sounds are normal.  Large abdominal distention with ascites, though not tender  Musculoskeletal: Normal range of motion. No edema.   Neurological: Alert and oriented to person, place, and time, though slow mentation  Skin: Skin is warm and dry. jaundice   Psychiatric: Normal mood and affect. Behavior is normal.   Urologic: No boggs in place  Vitals reviewed.    ALL LABS AND RADIOGRAPHS REVIEWED:    LABS:  Recent Results (from the past 24 hour(s))   CBC auto differential    Collection Time: 10/27/18  2:57 PM   Result Value Ref Range    WBC 15.80 (H) 3.90 - 12.70 K/uL    RBC 4.03 (L) 4.60 - 6.20 M/uL    Hemoglobin 13.4 (L) 14.0 - 18.0 g/dL    Hematocrit 40.1 40.0 - 54.0 %     (H) 82 - 98 fL    MCH 33.3 (H) 27.0 - 31.0 pg    MCHC 33.4 32.0 - 36.0 g/dL    RDW 19.3 (H) 11.5 - 14.5 %    Platelets 123 (L) 150 - 350 K/uL    MPV 11.8 9.2 - 12.9 fL    Immature Granulocytes 1.3 (H) 0.0 - 0.5 %    Gran # (ANC) 13.2 (H) 1.8 - 7.7 K/uL    Immature Grans (Abs) 0.20 (H) 0.00 - 0.04 K/uL    Lymph # 0.7 (L) 1.0 - 4.8 K/uL    Mono # 1.4 (H) 0.3 - 1.0 K/uL    Eos # 0.3 0.0 - 0.5 K/uL    Baso # 0.07 0.00 - 0.20 K/uL    nRBC 0 0 /100 WBC    Gran% 83.2 (H) 38.0 - 73.0 %    Lymph% 4.2 (L) 18.0 - 48.0 %    Mono% 9.1 4.0 - 15.0 %    Eosinophil% 1.8 0.0 - 8.0 %    Basophil% 0.4 0.0 - 1.9 %    Differential Method Automated    Comprehensive metabolic panel    Collection Time: 10/27/18  2:57 PM   Result Value Ref Range    Sodium 128 (L) 136 - 145 mmol/L    Potassium 4.6 3.5 - 5.1 mmol/L    Chloride 104 95 - 110 mmol/L    CO2 11 (L) 23 - 29 mmol/L    Glucose 149 (H) 70 - 110 mg/dL    BUN, Bld 113 (H) 6 - 20 mg/dL    Creatinine 6.0 (H) 0.5 - 1.4 mg/dL    Calcium 8.0 (L) 8.7 - 10.5 mg/dL    Total Protein 6.2 6.0 - 8.4 g/dL    Albumin 1.9 (L) 3.5 - 5.2 g/dL     Total Bilirubin 40.9 (H) 0.1 - 1.0 mg/dL    Alkaline Phosphatase 255 (H) 55 - 135 U/L     (H) 10 - 40 U/L    ALT 84 (H) 10 - 44 U/L    Anion Gap 13 8 - 16 mmol/L    eGFR if African American 11.3 (A) >60 mL/min/1.73 m^2    eGFR if non  9.8 (A) >60 mL/min/1.73 m^2   Protime-INR    Collection Time: 10/27/18  2:57 PM   Result Value Ref Range    Prothrombin Time 21.4 (H) 9.0 - 12.5 sec    INR 2.2 (H) 0.8 - 1.2   Hemoglobin A1c    Collection Time: 10/27/18  2:57 PM   Result Value Ref Range    Hemoglobin A1C 4.8 4.0 - 5.6 %    Estimated Avg Glucose 91 68 - 131 mg/dL   Fibrinogen    Collection Time: 10/27/18  2:57 PM   Result Value Ref Range    Fibrinogen 256 182 - 366 mg/dL   Osmolality    Collection Time: 10/27/18  2:57 PM   Result Value Ref Range    Osmolality 320 (H) 280 - 300 mOsm/kg   Iron and TIBC    Collection Time: 10/27/18  2:57 PM   Result Value Ref Range    Iron 101 45 - 160 ug/dL    Transferrin 66 (L) 200 - 375 mg/dL    TIBC 98 (L) 250 - 450 ug/dL    Saturated Iron 103 (H) 20 - 50 %   Haptoglobin    Collection Time: 10/27/18  2:57 PM   Result Value Ref Range    Haptoglobin 61 30 - 250 mg/dL   Magnesium    Collection Time: 10/27/18  2:57 PM   Result Value Ref Range    Magnesium 2.6 1.6 - 2.6 mg/dL   IgG    Collection Time: 10/27/18  2:57 PM   Result Value Ref Range    IgG - Serum 2065 (H) 650 - 1600 mg/dL   Ferritin    Collection Time: 10/27/18  2:57 PM   Result Value Ref Range    Ferritin 1,044 (H) 20.0 - 300.0 ng/mL   Lactate dehydrogenase    Collection Time: 10/27/18  2:57 PM   Result Value Ref Range     (H) 110 - 260 U/L   Vitamin B12    Collection Time: 10/27/18  2:57 PM   Result Value Ref Range    Vitamin B-12 >2000 (H) 210 - 950 pg/mL   Ceruloplasmin    Collection Time: 10/27/18  2:57 PM   Result Value Ref Range    Ceruloplasmin 31.0 15.0 - 45.0 mg/dL   Procalcitonin    Collection Time: 10/27/18  2:57 PM   Result Value Ref Range    Procalcitonin 2.70 (H) <0.25 ng/mL    Prealbumin    Collection Time: 10/27/18  2:57 PM   Result Value Ref Range    Prealbumin 7 (L) 20 - 43 mg/dL   Folate    Collection Time: 10/27/18  2:57 PM   Result Value Ref Range    Folate 16.4 4.0 - 24.0 ng/mL   Urinalysis, Reflex to Urine Culture Urine, Clean Catch    Collection Time: 10/27/18  4:40 PM   Result Value Ref Range    Specimen UA Urine, Clean Catch     Color, UA Jackelyn Yellow, Straw, Jackelyn    Appearance, UA Cloudy (A) Clear    pH, UA 5.0 5.0 - 8.0    Specific Gravity, UA 1.010 1.005 - 1.030    Protein, UA Negative Negative    Glucose, UA Negative Negative    Ketones, UA Negative Negative    Bilirubin (UA) 2+ (A) Negative    Occult Blood UA Negative Negative    Nitrite, UA Negative Negative    Leukocytes, UA 1+ (A) Negative   Sodium, urine, random    Collection Time: 10/27/18  4:42 PM   Result Value Ref Range    Sodium Urine Random <20 (A) 20 - 250 mmol/L   Creatinine, urine, random    Collection Time: 10/27/18  4:42 PM   Result Value Ref Range    Creatinine, Random Ur 117.0 23.0 - 375.0 mg/dL   Protein / creatinine ratio, urine    Collection Time: 10/27/18  4:42 PM   Result Value Ref Range    Protein, Urine Random 31 (H) 0 - 15 mg/dL    Creatinine, Random Ur 117.0 23.0 - 375.0 mg/dL    Prot/Creat Ratio, Ur 0.26 (H) 0.00 - 0.20   Sodium, urine, random    Collection Time: 10/27/18  4:42 PM   Result Value Ref Range    Sodium Urine Random <20 (A) 20 - 250 mmol/L   Urea nitrogen, urine    Collection Time: 10/27/18  4:42 PM   Result Value Ref Range    Urine Urea Nitrogen, Random 533 140 - 1050 mg/dL   Osmolality, urine    Collection Time: 10/27/18  4:42 PM   Result Value Ref Range    Osmolality, Ur 363 50 - 1200 mOsm/kg   Urinalysis Microscopic    Collection Time: 10/27/18  4:42 PM   Result Value Ref Range    RBC, UA 1 0 - 4 /hpf    WBC, UA 22 (H) 0 - 5 /hpf    Bacteria, UA Many (A) None-Occ /hpf    Hyaline Casts, UA 1 0-1/lpf /lpf    Microscopic Comment SEE COMMENT    Drug screen panel, emergency     Collection Time: 10/27/18  4:42 PM   Result Value Ref Range    Benzodiazepines Presumptive Positive     Methadone metabolites Negative     Cocaine (Metab.) Negative     Opiate Scrn, Ur Negative     Barbiturate Screen, Ur Negative     Amphetamine Screen, Ur Negative     THC Negative     Phencyclidine Negative     Creatinine, Random Ur 117.0 23.0 - 375.0 mg/dL    Toxicology Information SEE COMMENT    Lactic acid, plasma    Collection Time: 10/27/18  4:43 PM   Result Value Ref Range    Lactate (Lactic Acid) 1.4 0.5 - 2.2 mmol/L   POCT glucose    Collection Time: 10/27/18  6:48 PM   Result Value Ref Range    POCT Glucose 129 (H) 70 - 110 mg/dL     RADIOGRAPHS:  XR CHEST PA AND LATERAL    CLINICAL HISTORY:  Weakness    TECHNIQUE:  PA and lateral views of the chest were performed.    COMPARISON:  None    FINDINGS:  Monitoring leads overlie the chest.  There is moderate-sized right pleural effusion with probable associated atelectasis/infiltrate.  Left hemithorax is clear.  No pneumothorax.  Cardiac silhouette is upper limits of normal in size without evidence of failure.  Mediastinal contours are within normal limits.  No acute osseous process seen.      Impression       Moderate-sized right pleural effusion with probable underlying atelectasis/infiltrate.  Short-term follow-up chest radiography after therapy recommended to resolution.      Electronically signed by: John Jurado MD  Date: 10/27/2018     US RETROPERITONEAL COMPLETE    CLINICAL HISTORY:  acute renal failure;    TECHNIQUE:  Ultrasound of the kidneys and urinary bladder was performed including color flow and Doppler evaluation of the kidneys.    COMPARISON:  None.    FINDINGS:  Right kidney: The right kidney measures 12.1 cm. There is mild cortical thinning.  No loss of corticomedullary distinction. Resistive index measures 1.0.  No mass. No renal stone. No hydronephrosis.    Left kidney: The left kidney measures 13.1 cm. There is mild cortical thinning.  No  loss of corticomedullary distinction. Resistive index measures 1.0.  No mass. No renal stone. No hydronephrosis.    Bladder: Partially distended at the time of scanning and has an unremarkable appearance.    Spleen: Splenic resistive index obtained for the purposes of comparison measures 0.69.    Miscellaneous: Small volume abdominal ascites.      Impression       Findings consistent with chronic medical renal disease, without hydronephrosis.    Small volume abdominal ascites.    Electronically signed by resident: Dmitri Cruz  Date: 10/27/2018         Assessment and Plan:       Active Hospital Problems    Diagnosis  POA    *Acute liver failure without hepatic coma [K72.00]  Yes    Alcoholic hepatitis with ascites [K70.11]  Yes    Acute liver failure [K72.00]  Yes    Jaundice [R17]  Yes    Hepatorenal syndrome [K76.7]  Yes    DEL (acute kidney injury) [N17.9]  Yes     Chronic    ATN (acute tubular necrosis) [N17.0]  Yes    Severe alcohol dependence [F10.20]  Yes    Coagulopathy [D68.9]  Yes    Anemia of chronic disease [D63.8]  Yes    Thrombocytopenia [D69.6]  Yes    Hyponatremia [E87.1]  Yes    Hepatic encephalopathy [K72.90]  Yes    Sepsis [A41.9]  Yes    Metabolic acidosis [E87.2]  Yes    Moderate protein malnutrition [E44.0]  Yes    Type 2 diabetes mellitus without complication [E11.9]  Yes    Acute cystitis without hematuria [N30.00]  Yes    Pleural effusion associated with hepatic disorder [K76.9, J91.8]  Yes      Resolved Hospital Problems   No resolved problems to display.     Acute liver failure without coma  Alcohol Hepatitis with ascites  MELD-Na score: 42 at 10/27/2018  2:57 PM  MELD score: 43 at 10/27/2018  2:57 PM  Calculated from:  Serum Creatinine: 6 mg/dL (Rounded to 4 mg/dL) at 10/27/2018  2:57 PM  Serum Sodium: 128 mmol/L at 10/27/2018  2:57 PM  Total Bilirubin: 40.9 mg/dL at 10/27/2018  2:57 PM  INR(ratio): 2.2 at 10/27/2018  2:57 PM  Age: 53 years  -The patient has a long  history of alcohol use;  -He was flown here from [place] to be evaluated for a liver transplant per Dr. Sharma's recommendation  -Consult Hepatology  -PETH ordered  -Abdomen U/S with liver doppler ordered     Acute kidney injury   -?Hepatorenal Syndrome ?ATN ?Pre-renal  -There are multiple etiologies; high bile salts causing ATN, as well as having a viral gastroenteritis causing severe dehydration   -Starting patient on midodrine, albumin and octreotide   -octreotide injection 100 mcg, 100 mcg, Intravenous, Q8H   -midodrine tablet 10 mg, 10 mg, Oral, TID   -albumin human 25% bottle 25 g, 25 g, Intravenous, Q6H  -Renal doppler U/S; U/A and urine lytes ordered  -Strict I/O, daily weights   -Nephrology consulted, will likely need SLED/HD very soon  -In care everywhere, Cr on 10/22 was 2.2, today at 6  -Clinically appears dehydrated (third spacing of ascites however)   -Will trial out 1l NS with the albumin     Hepatic Encephalopathy  -Rule out infection as underlying cause as well  -Starting patient on lactulose to have 3 to 4 BMs daily   -lactulose 20 gram/30 mL solution Soln 20 g, 20 g, Oral, Q6H  -Continue home rifAXIMin tablet 550 mg, 550 mg, Oral, BID  -Will get CXR, blood cultures, LA, procal, U/A;   -He does have a known right sided pleural effusion on CXR  -Will give a dose of Vanc 1000mg and watch daily vanc levels and start patient on Cefepime   -Will need diagnostic tap     Pleural effusion associated with hepatic disorder, right sided pleural effusion  -Apparently was treated at OSH earlier this month for a PNA; had 1L drained at that time;  -Will get a CT chest without contrast for further evaluation  -Will get pulmonology consult and will likely need a thoracocentesis   -Bakari    Acute cystitis without hematuria  Leukocytosis  -Started on cefepime, follow up urine cultures    -ceFEPIme injection 2 g, 2 g, Intravenous, Q24H    Coagulopathy of liver disease  -Vitamin K for 3 days   -phytonadione vitamin k  (AQUA-MEPHYTON) 10 mg in dextrose 5 % 50 mL IVPB, 10 mg, Intravenous, Daily    Anemia of chronic disease  -B12, Folate, Fe studies  -DIC labs    Thrombocytopenia due to sequestration  -Monitor and transfuse with any hemorrhage    Severe alcohol dependence   - PETH pending; will get U tox  - addiction psych consult for Monday   -thiamine tablet 100 mg, 100 mg, Oral, Daily    Hyponatremia  -Fluid restrict to 800 cc; urine and serum osmols    Moderate protein carissa malnutrition   -PAB, novasource, dietary     Metabolic Acidosis   -Starting on sodium bicarbonate tablet 1,300 mg, 1,300 mg, Oral, TID    DM2 without complication  -Hba1c; patient on metformin at home  -SSI     Diet:  Renal, fluid restriction 800 cc  GI PPx:    DVT PPx:    Goals of Care:  full    High Risk Conditions:  Liver failure, sepsis, renal failure    Disposition:  Patient is too sick to leave prior to a liver transplant evaluation;   - low threshold for the ICU    HELENA Fitch MD

## 2018-10-27 NOTE — PROGRESS NOTES
[facility]  Nephrology  Progress Note    Patient Name: Femi Enciso  MRN: [id number]  Admission Date: 10/27/2018  Hospital Length of Stay: 0 days  Attending Provider: Hai Wylie MD   Primary Care Physician: Primary Doctor No  Principal Problem:Acute liver failure without hepatic coma    Subjective:     HPI: 52 y/o man with DM2 presents to the ED with family for liver failure (likely due to EtOH abundant history of drinking - diagnosed in Sept 2018 in [place]).  He reports jaundice, generalized weakness, nausea, diarrhea, and decreased appetite since Sept 2018.  He is from [place], [place], and Dr. Sharma (patients physician) recommended bringing him to hospital for evaluation.  Patient denies any fever, chills, vomiting, chest pain, palpitations, SOB, abdominal pain.      Nephrology consulted for evaluation/management Adri.     Past Medical History:   Diagnosis Date    Liver cirrhosis, alcoholic 09/30/2018       No past surgical history on file.    Review of patient's allergies indicates:   Allergen Reactions    Penicillins Nausea And Vomiting and Rash     Current Facility-Administered Medications   Medication Frequency    acetaminophen tablet 650 mg Q4H PRN    albumin human 25% bottle 25 g Q6H    ceFEPIme injection 2 g Q24H    dextrose 50% injection 12.5 g PRN    dextrose 50% injection 25 g PRN    glucagon (human recombinant) injection 1 mg PRN    glucose chewable tablet 16 g PRN    glucose chewable tablet 24 g PRN    lactulose 20 gram/30 mL solution Soln 20 g Q6H    midodrine tablet 10 mg TID    octreotide injection 100 mcg Q8H    phytonadione vitamin k (AQUA-MEPHYTON) 10 mg in dextrose 5 % 50 mL IVPB Daily    promethazine (PHENERGAN) 6.25 mg in dextrose 5 % 50 mL IVPB Q6H PRN    sodium bicarbonate tablet 1,300 mg TID    sodium chloride 0.9% flush 5 mL PRN    vancomycin in dextrose 5 % 1 gram/250 mL IVPB 1,000 mg Once     No current outpatient medications on file.     Family History      None        Tobacco Use    Smoking status: Not on file   Substance and Sexual Activity    Alcohol use: Not on file    Drug use: Not on file    Sexual activity: Not on file     Review of Systems   Constitutional: Negative for chills, fever. Positive for fatigue.  HENT: Negative for sore throat, trouble swallowing.    Eyes: Negative for photophobia, visual disturbance.   Respiratory: Negative for cough, shortness of breath.    Cardiovascular: Negative for chest pain, palpitations, leg swelling.   Gastrointestinal: Negative for abdominal pain, constipation, vomiting. Positive for nausea and diarrhea.  Endocrine: Negative for cold intolerance, heat intolerance.   Genitourinary: Negative for dysuria, frequency.   Musculoskeletal: Negative for arthralgias, myalgias.   Skin: Negative for rash, wound, erythema   Neurological: Negative for dizziness, syncope, weakness, light-headedness.   Psychiatric/Behavioral: Negative for confusion, hallucinations, anxiety  All other systems reviewed and are negative.      Objective:     Vital Signs (Most Recent):  Temp: 97.2 °F (36.2 °C) (10/27/18 1639)  Pulse: 72 (10/27/18 1530)  Resp: 17 (10/27/18 1530)  BP: 111/66 (10/27/18 1530)  SpO2: 95 % (10/27/18 1530) Vital Signs (24h Range):  Temp:  [97.2 °F (36.2 °C)-97.4 °F (36.3 °C)] 97.2 °F (36.2 °C)  Pulse:  [71-72] 72  Resp:  [17-18] 17  SpO2:  [95 %-98 %] 95 %  BP: ()/(53-67) 111/66     Weight: 79.4 kg (175 lb) (10/27/18 1353)  Body mass index is 25.11 kg/m².  Body surface area is 1.98 meters squared.    No intake/output data recorded.    Physical Exam  Constitutional: appears weak and ill   Head: Normocephalic and atraumatic.   Mouth/Throat: Oropharynx is clear and moist.   Eyes: EOM are normal. Pupils are equal, round, and reactive to light. positive scleral icterus.   Neck: Normal range of motion. Neck supple.   Cardiovascular: Normal rate and regular rhythm.  No murmur heard.  Pulmonary/Chest: Effort normal and breath  sounds normal. No respiratory distress. No wheezes, rales, or rhonchi  Abdominal: Soft. Bowel sounds are normal.  No distension or tenderness  Musculoskeletal: Normal range of motion. No edema.   Neurological: Alert and oriented to person, place, and time.   Skin: Skin is warm and dry.   Psychiatric: Normal mood and affect. Behavior is normal.   Nurse notes and vitals reviewed.    Significant Labs:  CBC:   Recent Labs   Lab 10/27/18  1457   WBC 15.80*   RBC 4.03*   HGB 13.4*   HCT 40.1   *   *   MCH 33.3*   MCHC 33.4     CMP:   Recent Labs   Lab 10/27/18  1457   *   CALCIUM 8.0*   ALBUMIN 1.9*   PROT 6.2   *   K 4.6   CO2 11*      *   CREATININE 6.0*   ALKPHOS 255*   ALT 84*   *   BILITOT 40.9*     All labs within the past 24 hours have been reviewed.    Significant Imaging:  CXR personally reviewed.    Assessment/Plan:     DEL (acute kidney injury)    Patient with elevated creatinine (unknown baseline), multiple etiologies including Hepatorenal syndrome (in setting of hyponetremia) and low to normal blood pressures.  Also patient presents with history of diarrhea (volume depleted state) which could cause ischemic ATN.    Plan:  - Please consider IV replenish 0.9%NSS to run by IV pump at 100 mL/hr for 500 mL and reassess (for volume expansion)  - Can consider Albumin admin 25% q 6 hrs x 4 doses and reassess in setting of low albumin  - U/A, UPCr, Renal US  - Will perform Urine Sediment for further analysis in AM  - Strict I/O and chart  - Avoid nephrotoxic medications  - Maintain MAP >65  - Hb > 7 gm/dL  - Medication doses adjusted to GFR           Thank you for your consult. I will follow-up with patient. Please contact us if you have any additional questions.    Charanjit wSann MD  Nephrology  [facility] [address]

## 2018-10-27 NOTE — ASSESSMENT & PLAN NOTE
Patient with elevated creatinine (unknown baseline), multiple etiologies including Hepatorenal syndrome (in setting of hyponetremia) and low to normal blood pressures.  Also patient presents with history of diarrhea (volume depleted state) which could cause ischemic ATN.    Plan:  - Please consider IV replenish 0.9%NSS to run by IV pump at 100 mL/hr for 500 mL and reassess (for volume expansion)  - Can consider Albumin admin 25% q 6 hrs x 4 doses and reassess in setting of low albumin  - U/A, UPCr, Renal US  - Will perform Urine Sediment for further analysis in AM  - Strict I/O and chart  - Avoid nephrotoxic medications  - Maintain MAP >65  - Hb > 7 gm/dL  - Medication doses adjusted to GFR

## 2018-10-27 NOTE — SUBJECTIVE & OBJECTIVE
Past Medical History:   Diagnosis Date    Liver cirrhosis, alcoholic 09/30/2018       No past surgical history on file.    Review of patient's allergies indicates:   Allergen Reactions    Penicillins Nausea And Vomiting and Rash     Current Facility-Administered Medications   Medication Frequency    acetaminophen tablet 650 mg Q4H PRN    albumin human 25% bottle 25 g Q6H    ceFEPIme injection 2 g Q24H    dextrose 50% injection 12.5 g PRN    dextrose 50% injection 25 g PRN    glucagon (human recombinant) injection 1 mg PRN    glucose chewable tablet 16 g PRN    glucose chewable tablet 24 g PRN    lactulose 20 gram/30 mL solution Soln 20 g Q6H    midodrine tablet 10 mg TID    octreotide injection 100 mcg Q8H    phytonadione vitamin k (AQUA-MEPHYTON) 10 mg in dextrose 5 % 50 mL IVPB Daily    promethazine (PHENERGAN) 6.25 mg in dextrose 5 % 50 mL IVPB Q6H PRN    sodium bicarbonate tablet 1,300 mg TID    sodium chloride 0.9% flush 5 mL PRN    vancomycin in dextrose 5 % 1 gram/250 mL IVPB 1,000 mg Once     No current outpatient medications on file.     Family History     None        Tobacco Use    Smoking status: Not on file   Substance and Sexual Activity    Alcohol use: Not on file    Drug use: Not on file    Sexual activity: Not on file     Review of Systems   Constitutional: Negative for chills, fever. Positive for fatigue.  HENT: Negative for sore throat, trouble swallowing.    Eyes: Negative for photophobia, visual disturbance.   Respiratory: Negative for cough, shortness of breath.    Cardiovascular: Negative for chest pain, palpitations, leg swelling.   Gastrointestinal: Negative for abdominal pain, constipation, vomiting. Positive for nausea and diarrhea.  Endocrine: Negative for cold intolerance, heat intolerance.   Genitourinary: Negative for dysuria, frequency.   Musculoskeletal: Negative for arthralgias, myalgias.   Skin: Negative for rash, wound, erythema   Neurological: Negative for  dizziness, syncope, weakness, light-headedness.   Psychiatric/Behavioral: Negative for confusion, hallucinations, anxiety  All other systems reviewed and are negative.      Objective:     Vital Signs (Most Recent):  Temp: 97.2 °F (36.2 °C) (10/27/18 1639)  Pulse: 72 (10/27/18 1530)  Resp: 17 (10/27/18 1530)  BP: 111/66 (10/27/18 1530)  SpO2: 95 % (10/27/18 1530) Vital Signs (24h Range):  Temp:  [97.2 °F (36.2 °C)-97.4 °F (36.3 °C)] 97.2 °F (36.2 °C)  Pulse:  [71-72] 72  Resp:  [17-18] 17  SpO2:  [95 %-98 %] 95 %  BP: ()/(53-67) 111/66     Weight: 79.4 kg (175 lb) (10/27/18 1353)  Body mass index is 25.11 kg/m².  Body surface area is 1.98 meters squared.    No intake/output data recorded.    Physical Exam  Constitutional: appears weak and ill   Head: Normocephalic and atraumatic.   Mouth/Throat: Oropharynx is clear and moist.   Eyes: EOM are normal. Pupils are equal, round, and reactive to light. positive scleral icterus.   Neck: Normal range of motion. Neck supple.   Cardiovascular: Normal rate and regular rhythm.  No murmur heard.  Pulmonary/Chest: Effort normal and breath sounds normal. No respiratory distress. No wheezes, rales, or rhonchi  Abdominal: Soft. Bowel sounds are normal.  No distension or tenderness  Musculoskeletal: Normal range of motion. No edema.   Neurological: Alert and oriented to person, place, and time.   Skin: Skin is warm and dry.   Psychiatric: Normal mood and affect. Behavior is normal.   Nurse notes and vitals reviewed.    Significant Labs:  CBC:   Recent Labs   Lab 10/27/18  1457   WBC 15.80*   RBC 4.03*   HGB 13.4*   HCT 40.1   *   *   MCH 33.3*   MCHC 33.4     CMP:   Recent Labs   Lab 10/27/18  1457   *   CALCIUM 8.0*   ALBUMIN 1.9*   PROT 6.2   *   K 4.6   CO2 11*      *   CREATININE 6.0*   ALKPHOS 255*   ALT 84*   *   BILITOT 40.9*     All labs within the past 24 hours have been reviewed.    Significant Imaging:  CXR personally  reviewed.

## 2018-10-27 NOTE — HPI
52 y/o man with DM2 presents to the ED with family for liver failure (likely due to EtOH abundant history of drinking - diagnosed in Sept 2018 in [place]).  He reports jaundice, generalized weakness, nausea, diarrhea, and decreased appetite since Sept 2018.  He is from [place], [place], and Dr. Sharma (patients physician) recommended bringing him to hospital for evaluation.  Patient denies any fever, chills, vomiting, chest pain, palpitations, SOB, abdominal pain.      Nephrology consulted for evaluation/management Adri.

## 2018-10-27 NOTE — ED PROVIDER NOTES
Encounter Date: 10/27/2018       History     Chief Complaint   Patient presents with    Liver Failure     weak , nausea, not feeling good,     Pt is a 52 y/o M with a PMHx of cirrhosis and DM2 presents to the ED with family for liver failure (diagnosed in Sept 2018 in [place]).  Pt reports jaundice, weakness, nausea, diarrhea and decreased appetite for greater than 1 month.  Pt states he is in liver failure and has flown from [place], [place] to [place] to continue care.  Pt's wife was arranging an [employer] flight to be transferred from [place] to [place] for 11/5, however, it was uncertain if insurance would cover the cost.  Wife stated Dr. Sharma recommended he not wait on medical care because he may be too sick to be transplanted and encouraged bringing him to the hospital; pt agreed with this plan.  Denies fever, chills, vomiting, hematochezia, dysuria, SOB, CP or abdominal pain.             Review of patient's allergies indicates:   Allergen Reactions    Penicillins Nausea And Vomiting and Rash     Past Medical History:   Diagnosis Date    Liver cirrhosis, alcoholic 09/30/2018     No past surgical history on file.  No family history on file.  Social History     Tobacco Use    Smoking status: Not on file   Substance Use Topics    Alcohol use: Not on file    Drug use: Not on file     Review of Systems   Constitutional: Positive for appetite change. Negative for fever.   HENT: Negative for sore throat.    Respiratory: Negative for shortness of breath.    Cardiovascular: Negative for chest pain.   Gastrointestinal: Positive for diarrhea and nausea.   Genitourinary: Negative for dysuria.   Musculoskeletal: Negative for back pain.   Skin: Negative for rash.   Hematological: Does not bruise/bleed easily.       Physical Exam     Initial Vitals [10/27/18 1353]   BP Pulse Resp Temp SpO2   (!) 95/53 71 18 97.4 °F (36.3 °C) 97 %      MAP       --         Physical Exam    Nursing note and vitals reviewed.  Constitutional: He appears  well-developed and well-nourished.   Alert, sitting in the bed in mild discomfort.     HENT:   Head: Atraumatic.   Eyes: EOM are normal.   Neck: Neck supple.   Cardiovascular: Normal rate and intact distal pulses.   No murmur heard.  Pulmonary/Chest: Breath sounds normal. He has no wheezes.   Abdominal: Soft. He exhibits distension.   Musculoskeletal: Normal range of motion.   Neurological: He is alert and oriented to person, place, and time.   Skin: Skin is warm and dry. Capillary refill takes less than 2 seconds.   Full body jaundice.     Psychiatric: He has a normal mood and affect. Thought content normal.         ED Course   Procedures  Labs Reviewed   CBC W/ AUTO DIFFERENTIAL - Abnormal; Notable for the following components:       Result Value    WBC 15.80 (*)     RBC 4.03 (*)     Hemoglobin 13.4 (*)      (*)     MCH 33.3 (*)     RDW 19.3 (*)     Platelets 123 (*)     Immature Granulocytes 1.3 (*)     Gran # (ANC) 13.2 (*)     Immature Grans (Abs) 0.20 (*)     Lymph # 0.7 (*)     Mono # 1.4 (*)     Gran% 83.2 (*)     Lymph% 4.2 (*)     All other components within normal limits   COMPREHENSIVE METABOLIC PANEL - Abnormal; Notable for the following components:    Sodium 128 (*)     CO2 11 (*)     Glucose 149 (*)     BUN, Bld 113 (*)     Creatinine 6.0 (*)     Calcium 8.0 (*)     Albumin 1.9 (*)     Total Bilirubin 40.9 (*)     Alkaline Phosphatase 255 (*)      (*)     ALT 84 (*)     eGFR if  11.3 (*)     eGFR if non  9.8 (*)     All other components within normal limits   PROTIME-INR - Abnormal; Notable for the following components:    Prothrombin Time 21.4 (*)     INR 2.2 (*)     All other components within normal limits   URINALYSIS, REFLEX TO URINE CULTURE - Abnormal; Notable for the following components:    Appearance, UA Cloudy (*)     Bilirubin (UA) 2+ (*)     Leukocytes, UA 1+ (*)     All other components within normal limits    Narrative:     Preferred  Collection Type->Urine, Clean Catch   SODIUM, URINE, RANDOM - Abnormal; Notable for the following components:    Sodium Urine Random <20 (*)     All other components within normal limits    Narrative:     Preferred Collection Type->Urine, Clean Catch   PROTEIN / CREATININE RATIO, URINE - Abnormal; Notable for the following components:    Protein, Urine Random 31 (*)     Prot/Creat Ratio, Ur 0.26 (*)     All other components within normal limits    Narrative:     Preferred Collection Type->Urine, Clean Catch   SODIUM, URINE, RANDOM - Abnormal; Notable for the following components:    Sodium Urine Random <20 (*)     All other components within normal limits    Narrative:     Preferred Collection Type->Urine, Clean Catch   URINALYSIS MICROSCOPIC - Abnormal; Notable for the following components:    WBC, UA 22 (*)     Bacteria, UA Many (*)     All other components within normal limits    Narrative:     Preferred Collection Type->Urine, Clean Catch   OSMOLALITY - Abnormal; Notable for the following components:    Osmolality 320 (*)     All other components within normal limits    Narrative:     ADD ON OSMO [id number] IRON AND TIBC [id number] HAPTOGLOBIN [id number]   MG [id number] IgG [id number] AND FERRITIN [id number] PER DR. HAWKINS   10/27/2018  18:36    IRON AND TIBC - Abnormal; Notable for the following components:    Transferrin 66 (*)     TIBC 98 (*)     Saturated Iron 103 (*)     All other components within normal limits    Narrative:     ADD ON OSMO [id number] IRON AND TIBC [id number] HAPTOGLOBIN [id number]   MG [id number] IgG [id number] AND FERRITIN [id number] PER DR. HAWKINS   10/27/2018  18:36   ADDING ON LDH, ANTI SMOOTH MUSCLE, B12, CERULOPLASMIN, PREABLUMIN,   PROCALCITONIN, MAYA, AND FOLATE PER CALLY HAWKINS DO 18:57    10/27/2018    IGG - Abnormal; Notable for the following components:    IgG - Serum 2065 (*)     All other components within normal limits    Narrative:     ADD ON OSMO [id number] IRON AND TIBC [id number]  HAPTOGLOBIN [id number]   MG [id number] IgG [id number] AND FERRITIN [id number] PER DR. HAWKINS   10/27/2018  18:36   ADDING ON LDH, ANTI SMOOTH MUSCLE, B12, CERULOPLASMIN, PREABLUMIN,   PROCALCITONIN, MAYA, AND FOLATE PER CALLY HAWKINS DO 18:57    10/27/2018    FERRITIN - Abnormal; Notable for the following components:    Ferritin 1,044 (*)     All other components within normal limits    Narrative:     ADD ON OSMO [id number] IRON AND TIBC [id number] HAPTOGLOBIN [id number]   MG [id number] IgG [id number] AND FERRITIN [id number] PER DR. HAWKINS   10/27/2018  18:36   ADDING ON LDH, ANTI SMOOTH MUSCLE, B12, CERULOPLASMIN, PREABLUMIN,   PROCALCITONIN, MAYA, AND FOLATE PER CALLY HAWKINS DO 18:57    10/27/2018    LACTATE DEHYDROGENASE - Abnormal; Notable for the following components:     (*)     All other components within normal limits    Narrative:     ADD ON OSMO [id number] IRON AND TIBC [id number] HAPTOGLOBIN [id number]   MG [id number] IgG [id number] AND FERRITIN [id number] PER DR. HAWKINS   10/27/2018  18:36   ADDING ON LDH, ANTI SMOOTH MUSCLE, B12, CERULOPLASMIN, PREABLUMIN,   PROCALCITONIN, MAYA, AND FOLATE PER CALLY HAWKINS DO 18:57    10/27/2018    PROCALCITONIN - Abnormal; Notable for the following components:    Procalcitonin 2.70 (*)     All other components within normal limits    Narrative:     ADD ON OSMO [id number] IRON AND TIBC [id number] HAPTOGLOBIN [id number]   MG [id number] IgG [id number] AND FERRITIN [id number] PER DR. HAWKINS   10/27/2018  18:36   ADDING ON LDH, ANTI SMOOTH MUSCLE, B12, CERULOPLASMIN, PREABLUMIN,   PROCALCITONIN, MAYA, AND FOLATE PER CALLY HAWKINS DO 18:57    10/27/2018  ADDING ON HEPATITIS PANEL ACUTE PER MARTINA BLACKMAN MD 19:31    10/27/2018     PREALBUMIN - Abnormal; Notable for the following components:    Prealbumin 7 (*)     All other components within normal limits    Narrative:     ADD ON OSMO [id number] IRON AND TIBC [id number] HAPTOGLOBIN [id number]   MG [id number] IgG [id number] AND FERRITIN  [id number] PER DR. HAWKINS   10/27/2018  18:36   ADDING ON LDH, ANTI SMOOTH MUSCLE, B12, CERULOPLASMIN, PREABLUMIN,   PROCALCITONIN, MAYA, AND FOLATE PER CALLY HAWKINS,  18:57    10/27/2018    POCT GLUCOSE - Abnormal; Notable for the following components:    POCT Glucose 129 (*)     All other components within normal limits   CULTURE, BLOOD   CULTURE, BLOOD   CULTURE, URINE   LACTIC ACID, PLASMA   CREATININE, URINE, RANDOM    Narrative:     Preferred Collection Type->Urine, Clean Catch   UREA NITROGEN, URINE, RANDOM    Narrative:     Preferred Collection Type->Urine, Clean Catch   OSMOLALITY, URINE RANDOM    Narrative:     Preferred Collection Type->Urine, Clean Catch   CBC W/ AUTO DIFFERENTIAL   CERULOPLASMIN   COMPREHENSIVE METABOLIC PANEL   FERRITIN   FOLATE   IGG   MAGNESIUM   PHOSPHATIDYLETHANOL (PETH)   PREALBUMIN   PROCALCITONIN   PROTIME-INR   VITAMIN B12   FIBRINOGEN   HAPTOGLOBIN   HEMOGLOBIN A1C   IRON AND TIBC   LACTATE DEHYDROGENASE   OSMOLALITY   ANTI-SMOOTH MUSCLE ANTIBODY   TOXICOLOGY SCREEN, URINE, RANDOM (COMPLIANCE)   MAYA PROFILE I (SCREEN)   HEPATITIS PANEL, ACUTE   HEMOGLOBIN A1C    Narrative:     ADD ON OSMO [id number] IRON AND TIBC [id number] HAPTOGLOBIN [id number]   MG [id number] IgG [id number] AND FERRITIN [id number] PER DR. HAWKINS   10/27/2018  18:36    FIBRINOGEN    Narrative:     ADD ON OSMO [id number] IRON AND TIBC [id number] HAPTOGLOBIN [id number]   MG [id number] IgG [id number] AND FERRITIN [id number] PER DR. HAWKINS   10/27/2018  18:36    HAPTOGLOBIN    Narrative:     ADD ON OSMO [id number] IRON AND TIBC [id number] HAPTOGLOBIN [id number]   MG [id number] IgG [id number] AND FERRITIN [id number] PER DR. HAWKINS   10/27/2018  18:36   ADDING ON LDH, ANTI SMOOTH MUSCLE, B12, CERULOPLASMIN, PREABLUMIN,   PROCALCITONIN, MAYA, AND FOLATE PER CALLY HAWKINS,  18:57    10/27/2018    MAGNESIUM    Narrative:     ADD ON OSMO [id number] IRON AND TIBC [id number] HAPTOGLOBIN [id number]   MG [id number] IgG [id number] AND  FERRITIN [id number] PER DR. HAWKINS   10/27/2018  18:36   ADDING ON LDH, ANTI SMOOTH MUSCLE, B12, CERULOPLASMIN, PREABLUMIN,   PROCALCITONIN, MAYA, AND FOLATE PER CALLY HAWKINS, DO 18:57    10/27/2018    CERULOPLASMIN    Narrative:     ADD ON OSMO [id number] IRON AND TIBC [id number] HAPTOGLOBIN [id number]   MG [id number] IgG [id number] AND FERRITIN [id number] PER DR. HAWKINS   10/27/2018  18:36   ADDING ON LDH, ANTI SMOOTH MUSCLE, B12, CERULOPLASMIN, PREABLUMIN,   PROCALCITONIN, MAYA, AND FOLATE PER CALLY HAWKINS,  18:57    10/27/2018    DRUG SCREEN PANEL, URINE EMERGENCY    Narrative:     Preferred Collection Type->Urine, Clean Catch  ADDING ON TOXICOLOGY SCREEN URINE PER PENELOPE BLACKMAN MD 19:04    10/27/2018    GLOVER'S STAIN, URINE RANDOM   PHOSPHATIDYLETHANOL (PETH)   VITAMIN B12   MAYA PROFILE I (SCREEN)   FOLATE   ANTI-SMOOTH MUSCLE ANTIBODY   HEPATITIS PANEL, ACUTE   POCT GLUCOSE MONITORING CONTINUOUS          Imaging Results          US Abdomen Complete with Dopp_Pre Liver Transplant (Final result)  Result time 10/27/18 19:25:51    Final result by John Jurado MD (10/27/18 19:25:51)                 Impression:      Findings suggesting hepatic cirrhosis and portal venous hypertension.    Mild gallbladder wall thickening which may be seen in the setting of hepatic dysfunction or ascites.    Gallbladder sludge and cholelithiasis.    Small volume abdominal ascites.    Right pleural effusion.    Electronically signed by resident: Dmitri Cruz  Date:    10/27/2018  Time:    18:33    Electronically signed by: John Jurado MD  Date:    10/27/2018  Time:    19:25             Narrative:    EXAMINATION:  US ABDOMEN COMP WITH DOPPLER_PRE LIVER TRANSPLANT    CLINICAL HISTORY:  acute liver failure;    TECHNIQUE:  Duplex scan of the liver was performed using B-mode/gray scale imaging and Doppler spectral analysis and color flow.    COMPARISON:  None    FINDINGS:  Liver: The enlarged with the right hepatic lobe spanning  approximately 18.1 cm.  Hepatic parenchyma demonstrates a nodular contour with coarse echotexture which may be seen in the setting of hepatic cirrhosis.  No focal hepatic abnormality.    Gallbladder: Filled with gallbladder sludge and at least 1 small gallstone measuring approximately 0.7 cm.  Gallbladder wall is mildly thickened which may be seen in the setting of hepatic dysfunction or ascites.  There is no sonographic Mercado sign.  No dilated intrahepatic radicles.  Common bile duct is normal in caliber measuring 3.0 mm.    Pancreas: Normal.    Spleen: Enlarged measuring 14.3 x 4.7 cm.    Aorta/IVC: Normal.    Kidneys: Right kidney measures 12.0 cm without hydronephrosis.  Left kidney measures 13.3 cm without hydronephrosis.    Vasculature: The middle, right, and left hepatic veins are patent and unremarkable. Hepatic arterial system is unremarkable.  The main, right, and left branches of the portal vein demonstrate hepatopedal flow and are unremarkable.  Splenic and superior mesenteric veins are patent.  Umbilical vein is recanalized.  Celiac artery is unable to be visualized.    Miscellaneous: Small volume abdominal ascites.  Right pleural effusion.                               US Retroperitoneal Complete (Kidney and (Final result)  Result time 10/27/18 18:37:21    Final result by John Jurado MD (10/27/18 18:37:21)                 Impression:      Findings consistent with chronic medical renal disease, without hydronephrosis.    Small volume abdominal ascites.    Electronically signed by resident: Dmitri Cruz  Date:    10/27/2018  Time:    18:22    Electronically signed by: John Jurado MD  Date:    10/27/2018  Time:    18:37             Narrative:    EXAMINATION:  US RETROPERITONEAL COMPLETE    CLINICAL HISTORY:  acute renal failure;    TECHNIQUE:  Ultrasound of the kidneys and urinary bladder was performed including color flow and Doppler evaluation of the  kidneys.    COMPARISON:  None.    FINDINGS:  Right kidney: The right kidney measures 12.1 cm. There is mild cortical thinning.  No loss of corticomedullary distinction. Resistive index measures 1.0.  No mass. No renal stone. No hydronephrosis.    Left kidney: The left kidney measures 13.1 cm. There is mild cortical thinning.  No loss of corticomedullary distinction. Resistive index measures 1.0.  No mass. No renal stone. No hydronephrosis.    Bladder: Partially distended at the time of scanning and has an unremarkable appearance.    Spleen: Splenic resistive index obtained for the purposes of comparison measures 0.69.    Miscellaneous: Small volume abdominal ascites.                               X-Ray Chest PA And Lateral (Final result)  Result time 10/27/18 16:59:09    Final result by John Jurado MD (10/27/18 16:59:09)                 Impression:      Moderate-sized right pleural effusion with probable underlying atelectasis/infiltrate.  Short-term follow-up chest radiography after therapy recommended to resolution.      Electronically signed by: John Jurado MD  Date:    10/27/2018  Time:    16:59             Narrative:    EXAMINATION:  XR CHEST PA AND LATERAL    CLINICAL HISTORY:  Weakness    TECHNIQUE:  PA and lateral views of the chest were performed.    COMPARISON:  None    FINDINGS:  Monitoring leads overlie the chest.  There is moderate-sized right pleural effusion with probable associated atelectasis/infiltrate.  Left hemithorax is clear.  No pneumothorax.  Cardiac silhouette is upper limits of normal in size without evidence of failure.  Mediastinal contours are within normal limits.  No acute osseous process seen.                                       APC / Resident Notes:   Pt is a 52 y/o M presents to the ED with family for liver failure (diagnosed in Sept 2018 in [place]).  My DDx includes but is not limited to acute hepatic failure, cirrhosis, hepatitis, or encephalopathy.  Will get labs and reassess.          3:06 PM  Hepatology has been consulted.  I have spoken to Dr. Reyes, Hepatology in reference to pt and directed to put orders in for CXR, Blood cultures, UA, Lactic, RUQ ultrasound and procalcitonin.    4:51 PM  Pt will admitted to in-patient.              Attending Attestation:     Physician Attestation Statement for NP/PA:   I discussed this assessment and plan of this patient with the NP/PA, but I did not personally examine the patient. The face to face encounter was performed by the NP/PA.                     Clinical Impression:   The primary encounter diagnosis was Jaundice. Diagnoses of Weakness, Decreased appetite, Nausea, Acute renal failure, unspecified acute renal failure type, Acute liver failure without hepatic coma, and Acute liver failure were also pertinent to this visit.      Disposition:   Disposition: Admitted  Condition: Stable                        Julius Guerra, DO  10/27/18 1756       Julius Guerra, DO  10/27/18 1944

## 2018-10-27 NOTE — Clinical Note
Prepped: right chest and right neck. Prepped with: ChloraPrep. The site was clipped. The patient was draped.

## 2018-10-28 PROBLEM — K74.60 DECOMPENSATED HEPATIC CIRRHOSIS: Status: ACTIVE | Noted: 2018-10-28

## 2018-10-28 PROBLEM — K72.90 DECOMPENSATED HEPATIC CIRRHOSIS: Status: ACTIVE | Noted: 2018-10-28

## 2018-10-28 LAB
ABO + RH BLD: NORMAL
AFP SERPL-MCNC: 2.1 NG/ML
ALBUMIN SERPL BCP-MCNC: 2.5 G/DL
ALBUMIN SERPL BCP-MCNC: 2.7 G/DL
ALBUMIN SERPL BCP-MCNC: 2.9 G/DL
ALP SERPL-CCNC: 205 U/L
ALT SERPL W/O P-5'-P-CCNC: 67 U/L
AMMONIA PLAS-SCNC: 78 UMOL/L
ANION GAP SERPL CALC-SCNC: 14 MMOL/L
ANION GAP SERPL CALC-SCNC: 15 MMOL/L
ANION GAP SERPL CALC-SCNC: 16 MMOL/L
AST SERPL-CCNC: 85 U/L
BASOPHILS # BLD AUTO: 0.06 K/UL
BASOPHILS NFR BLD: 0.4 %
BILIRUB SERPL-MCNC: 41.8 MG/DL
BLD GP AB SCN CELLS X3 SERPL QL: NORMAL
BLD PROD TYP BPU: NORMAL
BLOOD UNIT EXPIRATION DATE: NORMAL
BLOOD UNIT TYPE CODE: 5100
BLOOD UNIT TYPE: NORMAL
BNP SERPL-MCNC: 1071 PG/ML
BUN SERPL-MCNC: 123 MG/DL
BUN SERPL-MCNC: 130 MG/DL
BUN SERPL-MCNC: 94 MG/DL
CALCIUM SERPL-MCNC: 7.6 MG/DL
CALCIUM SERPL-MCNC: 7.7 MG/DL
CALCIUM SERPL-MCNC: 7.7 MG/DL
CHLORIDE SERPL-SCNC: 105 MMOL/L
CHLORIDE SERPL-SCNC: 106 MMOL/L
CHLORIDE SERPL-SCNC: 106 MMOL/L
CO2 SERPL-SCNC: 10 MMOL/L
CO2 SERPL-SCNC: 10 MMOL/L
CO2 SERPL-SCNC: 15 MMOL/L
CODING SYSTEM: NORMAL
CREAT SERPL-MCNC: 5.1 MG/DL
CREAT SERPL-MCNC: 6.3 MG/DL
CREAT SERPL-MCNC: 6.7 MG/DL
DIFFERENTIAL METHOD: ABNORMAL
DISPENSE STATUS: NORMAL
EOSINOPHIL # BLD AUTO: 0.5 K/UL
EOSINOPHIL NFR BLD: 3.1 %
ERYTHROCYTE [DISTWIDTH] IN BLOOD BY AUTOMATED COUNT: 19.2 %
EST. GFR  (AFRICAN AMERICAN): 10.7 ML/MIN/1.73 M^2
EST. GFR  (AFRICAN AMERICAN): 13.8 ML/MIN/1.73 M^2
EST. GFR  (AFRICAN AMERICAN): 9.9 ML/MIN/1.73 M^2
EST. GFR  (NON AFRICAN AMERICAN): 11.9 ML/MIN/1.73 M^2
EST. GFR  (NON AFRICAN AMERICAN): 8.6 ML/MIN/1.73 M^2
EST. GFR  (NON AFRICAN AMERICAN): 9.2 ML/MIN/1.73 M^2
GLUCOSE SERPL-MCNC: 115 MG/DL
GLUCOSE SERPL-MCNC: 133 MG/DL
GLUCOSE SERPL-MCNC: 143 MG/DL
HCT VFR BLD AUTO: 36.9 %
HGB BLD-MCNC: 12.5 G/DL
IMM GRANULOCYTES # BLD AUTO: 0.14 K/UL
IMM GRANULOCYTES NFR BLD AUTO: 0.9 %
INR PPP: 2.3
LACTATE SERPL-SCNC: 1.4 MMOL/L
LYMPHOCYTES # BLD AUTO: 0.9 K/UL
LYMPHOCYTES NFR BLD: 5.4 %
MAGNESIUM SERPL-MCNC: 2.2 MG/DL
MAGNESIUM SERPL-MCNC: 2.6 MG/DL
MAGNESIUM SERPL-MCNC: 2.6 MG/DL
MCH RBC QN AUTO: 32.6 PG
MCHC RBC AUTO-ENTMCNC: 33.9 G/DL
MCV RBC AUTO: 96 FL
MONOCYTES # BLD AUTO: 1.4 K/UL
MONOCYTES NFR BLD: 9.1 %
NEUTROPHILS # BLD AUTO: 12.7 K/UL
NEUTROPHILS NFR BLD: 81.1 %
NRBC BLD-RTO: 0 /100 WBC
NUM UNITS TRANS FFP: NORMAL
PHOSPHATE SERPL-MCNC: 6 MG/DL
PHOSPHATE SERPL-MCNC: 8.3 MG/DL
PHOSPHATE SERPL-MCNC: 8.9 MG/DL
PLATELET # BLD AUTO: 120 K/UL
PMV BLD AUTO: 12.5 FL
POCT GLUCOSE: 104 MG/DL (ref 70–110)
POCT GLUCOSE: 133 MG/DL (ref 70–110)
POCT GLUCOSE: 167 MG/DL (ref 70–110)
POTASSIUM SERPL-SCNC: 4 MMOL/L
POTASSIUM SERPL-SCNC: 4.4 MMOL/L
POTASSIUM SERPL-SCNC: 4.6 MMOL/L
PROT SERPL-MCNC: 5.9 G/DL
PROTHROMBIN TIME: 22.1 SEC
RBC # BLD AUTO: 3.83 M/UL
SODIUM SERPL-SCNC: 131 MMOL/L
SODIUM SERPL-SCNC: 132 MMOL/L
SODIUM SERPL-SCNC: 134 MMOL/L
VANCOMYCIN SERPL-MCNC: 12.9 UG/ML
WBC # BLD AUTO: 15.61 K/UL

## 2018-10-28 PROCEDURE — 82105 ALPHA-FETOPROTEIN SERUM: CPT | Mod: NTX

## 2018-10-28 PROCEDURE — 80069 RENAL FUNCTION PANEL: CPT | Mod: NTX

## 2018-10-28 PROCEDURE — 99223 PR INITIAL HOSPITAL CARE,LEVL III: ICD-10-PCS | Mod: NTX,,, | Performed by: INTERNAL MEDICINE

## 2018-10-28 PROCEDURE — 36430 TRANSFUSION BLD/BLD COMPNT: CPT | Mod: NTX

## 2018-10-28 PROCEDURE — 25000003 PHARM REV CODE 250: Mod: NTX | Performed by: HOSPITALIST

## 2018-10-28 PROCEDURE — 99233 SBSQ HOSP IP/OBS HIGH 50: CPT | Mod: NTX,,, | Performed by: HOSPITALIST

## 2018-10-28 PROCEDURE — 80202 ASSAY OF VANCOMYCIN: CPT | Mod: NTX

## 2018-10-28 PROCEDURE — 36415 COLL VENOUS BLD VENIPUNCTURE: CPT | Mod: NTX

## 2018-10-28 PROCEDURE — P9017 PLASMA 1 DONOR FRZ W/IN 8 HR: HCPCS | Mod: NTX

## 2018-10-28 PROCEDURE — P9047 ALBUMIN (HUMAN), 25%, 50ML: HCPCS | Mod: JG,NTX | Performed by: HOSPITALIST

## 2018-10-28 PROCEDURE — 27201109 HC SYSTEM FECAL MANAGEMENT: Mod: NTX

## 2018-10-28 PROCEDURE — 83735 ASSAY OF MAGNESIUM: CPT | Mod: NTX

## 2018-10-28 PROCEDURE — 20000000 HC ICU ROOM: Mod: NTX

## 2018-10-28 PROCEDURE — 25000242 PHARM REV CODE 250 ALT 637 W/ HCPCS: Mod: NTX | Performed by: HOSPITALIST

## 2018-10-28 PROCEDURE — 83880 ASSAY OF NATRIURETIC PEPTIDE: CPT | Mod: NTX

## 2018-10-28 PROCEDURE — 94640 AIRWAY INHALATION TREATMENT: CPT | Mod: NTX

## 2018-10-28 PROCEDURE — 25000003 PHARM REV CODE 250: Mod: NTX | Performed by: INTERNAL MEDICINE

## 2018-10-28 PROCEDURE — C1752 CATH,HEMODIALYSIS,SHORT-TERM: HCPCS | Mod: NTX

## 2018-10-28 PROCEDURE — 36556 INSERT NON-TUNNEL CV CATH: CPT | Mod: NTX

## 2018-10-28 PROCEDURE — 63600175 PHARM REV CODE 636 W HCPCS: Mod: NTX | Performed by: HOSPITALIST

## 2018-10-28 PROCEDURE — 99223 1ST HOSP IP/OBS HIGH 75: CPT | Mod: NTX,,, | Performed by: INTERNAL MEDICINE

## 2018-10-28 PROCEDURE — 80053 COMPREHEN METABOLIC PANEL: CPT | Mod: NTX

## 2018-10-28 PROCEDURE — 84100 ASSAY OF PHOSPHORUS: CPT | Mod: NTX

## 2018-10-28 PROCEDURE — 83605 ASSAY OF LACTIC ACID: CPT | Mod: NTX

## 2018-10-28 PROCEDURE — 86850 RBC ANTIBODY SCREEN: CPT | Mod: NTX

## 2018-10-28 PROCEDURE — 99233 PR SUBSEQUENT HOSPITAL CARE,LEVL III: ICD-10-PCS | Mod: NTX,,, | Performed by: HOSPITALIST

## 2018-10-28 PROCEDURE — 85025 COMPLETE CBC W/AUTO DIFF WBC: CPT | Mod: NTX

## 2018-10-28 PROCEDURE — 94761 N-INVAS EAR/PLS OXIMETRY MLT: CPT | Mod: NTX

## 2018-10-28 PROCEDURE — 90945 DIALYSIS ONE EVALUATION: CPT | Mod: NTX

## 2018-10-28 PROCEDURE — 82140 ASSAY OF AMMONIA: CPT | Mod: NTX

## 2018-10-28 PROCEDURE — C1751 CATH, INF, PER/CENT/MIDLINE: HCPCS | Mod: NTX

## 2018-10-28 PROCEDURE — 83735 ASSAY OF MAGNESIUM: CPT | Mod: 91,NTX

## 2018-10-28 PROCEDURE — 85610 PROTHROMBIN TIME: CPT | Mod: NTX

## 2018-10-28 RX ORDER — HYDROCODONE BITARTRATE AND ACETAMINOPHEN 500; 5 MG/1; MG/1
TABLET ORAL
Status: DISCONTINUED | OUTPATIENT
Start: 2018-10-28 | End: 2018-11-01

## 2018-10-28 RX ORDER — ONDANSETRON 4 MG/1
4 TABLET, FILM COATED ORAL DAILY PRN
COMMUNITY
End: 2019-02-15

## 2018-10-28 RX ORDER — PNV NO.95/FERROUS FUM/FOLIC AC 28MG-0.8MG
100 TABLET ORAL DAILY
Status: ON HOLD | COMMUNITY
End: 2019-01-08 | Stop reason: HOSPADM

## 2018-10-28 RX ORDER — MULTIVITAMIN
1 TABLET ORAL DAILY
COMMUNITY

## 2018-10-28 RX ORDER — LACTULOSE 10 G/15ML
30 SOLUTION ORAL EVERY 6 HOURS
Status: DISCONTINUED | OUTPATIENT
Start: 2018-10-28 | End: 2018-10-30

## 2018-10-28 RX ORDER — FOLIC ACID 1 MG/1
1 TABLET ORAL DAILY
Status: ON HOLD | COMMUNITY
End: 2019-01-08 | Stop reason: HOSPADM

## 2018-10-28 RX ORDER — SEVELAMER CARBONATE 800 MG/1
800 TABLET, FILM COATED ORAL
Status: DISCONTINUED | OUTPATIENT
Start: 2018-10-28 | End: 2018-11-11

## 2018-10-28 RX ORDER — MAGNESIUM SULFATE HEPTAHYDRATE 40 MG/ML
2 INJECTION, SOLUTION INTRAVENOUS
Status: ACTIVE | OUTPATIENT
Start: 2018-10-28 | End: 2018-10-29

## 2018-10-28 RX ORDER — HYDROCODONE BITARTRATE AND ACETAMINOPHEN 500; 5 MG/1; MG/1
TABLET ORAL CONTINUOUS
Status: ACTIVE | OUTPATIENT
Start: 2018-10-28 | End: 2018-10-29

## 2018-10-28 RX ORDER — VANCOMYCIN HCL IN 5 % DEXTROSE 1G/250ML
1000 PLASTIC BAG, INJECTION (ML) INTRAVENOUS ONCE
Status: COMPLETED | OUTPATIENT
Start: 2018-10-28 | End: 2018-10-28

## 2018-10-28 RX ORDER — OMEPRAZOLE 40 MG/1
40 CAPSULE, DELAYED RELEASE ORAL DAILY
Status: ON HOLD | COMMUNITY
End: 2019-01-08 | Stop reason: HOSPADM

## 2018-10-28 RX ORDER — LACTULOSE 10 G/15ML
20 SOLUTION ORAL; RECTAL 3 TIMES DAILY
Status: ON HOLD | COMMUNITY
End: 2019-01-08 | Stop reason: HOSPADM

## 2018-10-28 RX ADMIN — LACTULOSE 20 G: 20 SOLUTION ORAL at 05:10

## 2018-10-28 RX ADMIN — RIFAXIMIN 550 MG: 550 TABLET ORAL at 09:10

## 2018-10-28 RX ADMIN — IPRATROPIUM BROMIDE AND ALBUTEROL SULFATE 3 ML: .5; 3 SOLUTION RESPIRATORY (INHALATION) at 08:10

## 2018-10-28 RX ADMIN — CEFEPIME 2 G: 2 INJECTION, POWDER, FOR SOLUTION INTRAVENOUS at 07:10

## 2018-10-28 RX ADMIN — MIDODRINE HYDROCHLORIDE 10 MG: 5 TABLET ORAL at 09:10

## 2018-10-28 RX ADMIN — OCTREOTIDE ACETATE 100 MCG: 100 INJECTION, SOLUTION INTRAVENOUS; SUBCUTANEOUS at 05:10

## 2018-10-28 RX ADMIN — MIDODRINE HYDROCHLORIDE 10 MG: 5 TABLET ORAL at 08:10

## 2018-10-28 RX ADMIN — PHYTONADIONE 10 MG: 10 INJECTION, EMULSION INTRAMUSCULAR; INTRAVENOUS; SUBCUTANEOUS at 09:10

## 2018-10-28 RX ADMIN — SEVELAMER CARBONATE 800 MG: 800 TABLET, FILM COATED ORAL at 09:10

## 2018-10-28 RX ADMIN — PANTOPRAZOLE SODIUM 40 MG: 40 TABLET, DELAYED RELEASE ORAL at 09:10

## 2018-10-28 RX ADMIN — ALBUMIN HUMAN 25 G: 0.25 SOLUTION INTRAVENOUS at 09:10

## 2018-10-28 RX ADMIN — ALBUMIN HUMAN 25 G: 0.25 SOLUTION INTRAVENOUS at 01:10

## 2018-10-28 RX ADMIN — LIDOCAINE 1 PATCH: 50 PATCH CUTANEOUS at 08:10

## 2018-10-28 RX ADMIN — LACTULOSE 20 G: 20 SOLUTION ORAL at 01:10

## 2018-10-28 RX ADMIN — RIFAXIMIN 550 MG: 550 TABLET ORAL at 08:10

## 2018-10-28 RX ADMIN — MIDODRINE HYDROCHLORIDE 10 MG: 5 TABLET ORAL at 02:10

## 2018-10-28 RX ADMIN — THERA TABS 1 TABLET: TAB at 09:10

## 2018-10-28 RX ADMIN — SODIUM BICARBONATE 650 MG TABLET 1300 MG: at 08:10

## 2018-10-28 RX ADMIN — VANCOMYCIN HYDROCHLORIDE 1000 MG: 1 INJECTION, POWDER, LYOPHILIZED, FOR SOLUTION INTRAVENOUS at 09:10

## 2018-10-28 RX ADMIN — ALBUMIN HUMAN 25 G: 0.25 SOLUTION INTRAVENOUS at 05:10

## 2018-10-28 RX ADMIN — LACTULOSE 30 G: 20 SOLUTION ORAL at 07:10

## 2018-10-28 RX ADMIN — OCTREOTIDE ACETATE 100 MCG: 100 INJECTION, SOLUTION INTRAVENOUS; SUBCUTANEOUS at 09:10

## 2018-10-28 RX ADMIN — LACTULOSE 30 G: 20 SOLUTION ORAL at 01:10

## 2018-10-28 RX ADMIN — SODIUM BICARBONATE 650 MG TABLET 1300 MG: at 09:10

## 2018-10-28 RX ADMIN — FOLIC ACID 1 MG: 1 TABLET ORAL at 09:10

## 2018-10-28 RX ADMIN — OCTREOTIDE ACETATE 100 MCG: 100 INJECTION, SOLUTION INTRAVENOUS; SUBCUTANEOUS at 03:10

## 2018-10-28 RX ADMIN — SODIUM BICARBONATE 650 MG TABLET 1300 MG: at 02:10

## 2018-10-28 RX ADMIN — Medication 100 MG: at 09:10

## 2018-10-28 RX ADMIN — IPRATROPIUM BROMIDE AND ALBUTEROL SULFATE 3 ML: .5; 3 SOLUTION RESPIRATORY (INHALATION) at 01:10

## 2018-10-28 NOTE — ASSESSMENT & PLAN NOTE
- Recent hospitalization w Rx for PNA and C.diff  - Leukocytosis at 13k,    Broad coverage w vanco and cefepime   F/u cultures, deescalate as needed

## 2018-10-28 NOTE — HPI
52 y/o man with DM2, recent diagnosis of liver failure likely secondary to EtOH abuse in 09/2018, DEL progressing to ESRD admitted to the medicine team for transplant evaluation.   Of note, he was previous in fair health until in September, N/V and diarrhea after sanwitch, resolved, then found have increasing jaundice. Went to hospital, found to have ALI, while he also got hypoxic, CT chest showed large pleural effusion, he was admitted, underwent thoracentesis(removed about 1L), cell study negative for malignancy, and he was treated for PNA and C.diff as well. However, he continue to have weight loss, and start to have AMS, readmitted to hospital, found to have DEL despite ALI. So he was transferred here for eval of liver tx.  Last drink was 9/21/2018.   Denies any fever or chills or chest pain or abdominal pain.

## 2018-10-28 NOTE — ASSESSMENT & PLAN NOTE
Summary of serologies:  --Ferritin 1044, Iron 101, Transferrin 66, TIBC 98 Iron sat 103  --Cerruloplasmin-IP  --MAYA, ASMA-IP  --IgG 2065  --Acute hepatis panel-IP  --AFP-IP  --PETH-IP    - Continue albumin, midodrine, lactulose and rifaxime  - Continue abx,   - Hepatology consulted, follow up recs:    Recommendations:  --Daily CMP, CBC, and INR  --F/U Blood and urine cultures, small ascites on US  --Continue antibiotics  --Continue HRS protocol  --Continue lactulose and rifaximin  --Continue folic acid and thiamine   --Nephrology consult  --Addiction psych consult  --Nutrition consult  --PT and OT

## 2018-10-28 NOTE — SUBJECTIVE & OBJECTIVE
Past Medical History:   Diagnosis Date    Liver cirrhosis, alcoholic 09/30/2018       No past surgical history on file.    Review of patient's allergies indicates:   Allergen Reactions    Penicillins Nausea And Vomiting and Rash       Family History     None        Tobacco Use    Smoking status: Not on file   Substance and Sexual Activity    Alcohol use: Not on file    Drug use: Not on file    Sexual activity: Not on file      Review of Systems   Constitutional: Positive for activity change, appetite change, chills, fatigue and unexpected weight change. Negative for diaphoresis and fever.   HENT: Negative for congestion, drooling, ear discharge, facial swelling, hearing loss, sore throat and tinnitus.    Eyes: Negative.         Icteral jaundice   Respiratory: Positive for shortness of breath. Negative for cough, choking, chest tightness and stridor.    Cardiovascular: Negative.  Negative for chest pain, palpitations and leg swelling.   Gastrointestinal: Positive for abdominal distention, diarrhea, nausea and vomiting. Negative for abdominal pain, anal bleeding, blood in stool and constipation.   Endocrine: Negative.  Negative for polydipsia, polyphagia and polyuria.   Genitourinary: Negative for difficulty urinating, dysuria, enuresis, flank pain, frequency and hematuria.   Musculoskeletal: Negative.  Negative for arthralgias, back pain, gait problem, joint swelling, neck pain and neck stiffness.   Skin: Negative.    Allergic/Immunologic: Negative.    Neurological: Positive for tremors and weakness. Negative for dizziness, seizures, syncope, facial asymmetry, speech difficulty, light-headedness, numbness and headaches.   Hematological: Negative.  Negative for adenopathy. Does not bruise/bleed easily.   Psychiatric/Behavioral: Positive for confusion. Negative for agitation, behavioral problems, decreased concentration, dysphoric mood, self-injury and sleep disturbance. The patient is not nervous/anxious and is  not hyperactive.      Objective:     Vital Signs (Most Recent):  Temp: 98.4 °F (36.9 °C) (10/28/18 1231)  Pulse: 79 (10/28/18 1325)  Resp: 20 (10/28/18 1325)  BP: (!) 106/58 (10/28/18 1231)  SpO2: 96 % (10/28/18 1325) Vital Signs (24h Range):  Temp:  [96.3 °F (35.7 °C)-98.4 °F (36.9 °C)] 98.4 °F (36.9 °C)  Pulse:  [70-87] 79  Resp:  [16-24] 20  SpO2:  [91 %-100 %] 96 %  BP: (106-131)/(56-88) 106/58   Weight: 83.7 kg (184 lb 9.6 oz)  Body mass index is 26.49 kg/m².      Intake/Output Summary (Last 24 hours) at 10/28/2018 1439  Last data filed at 10/28/2018 0920  Gross per 24 hour   Intake 1450 ml   Output 351 ml   Net 1099 ml       Physical Exam   Constitutional: He is oriented to person, place, and time. No distress.   HENT:   Head: Normocephalic and atraumatic.   Scleral jaundice present;  AAOx3 at the time, following commands   Eyes: EOM are normal. Right eye exhibits no discharge. Left eye exhibits no discharge. Scleral icterus is present.   Neck: Normal range of motion. Neck supple. No JVD present. No tracheal deviation present.   Cardiovascular: Normal rate and regular rhythm. Exam reveals no gallop and no friction rub.   No murmur heard.  Pulmonary/Chest: No stridor. No respiratory distress. He has no wheezes. He has rales. He exhibits no tenderness.   Mildly labored breathing  SOB, but maintaining sats on room air   Abdominal: Soft. Bowel sounds are normal. He exhibits distension. There is no tenderness. There is no rebound and no guarding. No hernia.   Musculoskeletal: Normal range of motion. He exhibits no edema, tenderness or deformity.   Lymphadenopathy:     He has no cervical adenopathy.   Neurological: He is alert and oriented to person, place, and time. He displays abnormal reflex. No cranial nerve deficit or sensory deficit. He exhibits abnormal muscle tone.   Sleepy, but follows commands appropriately   Skin: Skin is warm and dry. Capillary refill takes less than 2 seconds. He is not diaphoretic.    Jaundice       Vents:     Lines/Drains/Airways     Peripheral Intravenous Line                 Peripheral IV - Single Lumen 10/27/18 1456 Right Antecubital less than 1 day         Peripheral IV - Single Lumen 10/27/18 1600 Left Hand less than 1 day              Significant Labs:    CBC/Anemia Profile:  Recent Labs   Lab 10/27/18  1457 10/28/18  0453   WBC 15.80* 15.61*   HGB 13.4* 12.5*   HCT 40.1 36.9*   * 120*   * 96   RDW 19.3* 19.2*   IRON 101  --    FERRITIN 1,044*  --    FOLATE 16.4  --    [id number] >2000*  --         Chemistries:  Recent Labs   Lab 10/27/18  1457 10/28/18  0452   * 131*   K 4.6 4.4    106   CO2 11* 10*   * 123*   CREATININE 6.0* 6.3*   CALCIUM 8.0* 7.7*   ALBUMIN 1.9* 2.5*   PROT 6.2 5.9*   BILITOT 40.9* 41.8*   ALKPHOS 255* 205*   ALT 84* 67*   * 85*   MG 2.6 2.6   PHOS  --  8.3*       Recent Lab Results       10/28/18  1355   10/28/18  1215   10/28/18  0814   10/28/18  0453   10/28/18  0452        Benzodiazepines               Methadone metabolites               Phencyclidine               Immature Granulocytes       0.9       Immature Grans (Abs)       0.14  Comment:  Mild elevation in immature granulocytes is non specific and   can be seen in a variety of conditions including stress response,   acute inflammation, trauma and pregnancy. Correlation with other   laboratory and clinical findings is essential.         Albumin         2.5     Alkaline Phosphatase         205     ALT         67  Comment:  *Result may be interfered by hyperbilirubinemia     Amphetamine Screen, Ur               Anion Gap         15     Appearance, UA               AST         85     Bacteria, UA               Barbiturate Screen, Ur               Baso #       0.06       Basophil%       0.4       Bilirubin (UA)               Total Bilirubin         41.8  Comment:  For infants and newborns, interpretation of results should be based  on gestational age, weight and in  agreement with clinical  observations.  Premature Infant recommended reference ranges:  Up to 24 hours.............<8.0 mg/dL  Up to 48 hours............<12.0 mg/dL  3-5 days..................<15.0 mg/dL  6-29 days.................<15.0 mg/dL       Blood Culture, Routine               BUN, Bld         123     Calcium         7.7     Chloride         106     CO2         10     Cocaine (Metab.)               Color, UA               Creatinine         6.3  Comment:  *Result may be interfered by hyperbilirubinemia     Creatinine, Random Ur               Differential Method       Automated       eGFR if          10.7     eGFR if non          9.2  Comment:  Calculation used to obtain the estimated glomerular filtration  rate (eGFR) is the CKD-EPI equation.        Eos #       0.5       Eosinophil%       3.1       Glucose         115     Glucose, UA               Gran # (ANC)       12.7       Gran%       81.1       Hematocrit       36.9       Hemoglobin       12.5       Hyaline Casts, UA               Coumadin Monitoring INR       2.3  Comment:  Coumadin Therapy:  2.0 - 3.0 for INR for all indicators except mechanical heart valves  and antiphospholipid syndromes which should use 2.5 - 3.5.         Ketones, UA               Lactate, Bryant 1.4  Comment:  Falsely low lactic acid results can be found in samples   containing >=13.0 mg/dL total bilirubin and/or >=3.5 mg/dL   direct bilirubin.               Leukocytes, UA               Lymph #       0.9       Lymph%       5.4       Magnesium         2.6     MCH       32.6       MCHC       33.9       MCV       96       Microscopic Comment               Mono #       1.4       Mono%       9.1       MPV       12.5       Nitrite, UA               nRBC       0       Occult Blood UA               Opiate Scrn, Ur               Osmolality, Ur               pH, UA               Phosphorus         8.3     Platelets       120       POCT Glucose   133 104          Potassium         4.4     Prot/Creat Ratio, Ur               Total Protein         5.9     Protein, UA               Protein, Urine Random               Protime       22.1       RBC       3.83       RBC, UA               RDW       19.2       Sodium         131     Sodium Urine Random               Specific [place], UA               Specimen UA               Marijuana (THC) Metabolite               Toxicology Information               Urine Urea Nitrogen, Random               Vancomycin, Random         12.9     WBC, UA               WBC       15.61       Villegas's Stain, Ur                                10/27/18  2158   10/27/18  1848   10/27/18  1715   10/27/18  1702   10/27/18  1643        Benzodiazepines               Methadone metabolites               Phencyclidine               Immature Granulocytes               Immature Grans (Abs)               Albumin               Alkaline Phosphatase               ALT               Amphetamine Screen, Ur               Anion Gap               Appearance, UA               AST               Bacteria, UA               Barbiturate Screen, Ur               Baso #               Basophil%               Bilirubin (UA)               Total Bilirubin               Blood Culture, Routine     No Growth to date[P] No Growth to date[P]       BUN, Bld               Calcium               Chloride               CO2               Cocaine (Metab.)               Color, UA               Creatinine               Creatinine, Random Ur               Differential Method               eGFR if                eGFR if non                Eos #               Eosinophil%               Glucose               Glucose, UA               Gran # (ANC)               Gran%               Hematocrit               Hemoglobin               Hyaline Casts, UA               Coumadin Monitoring INR               Ketones, UA               Lactate, Bryant         1.4  Comment:  Falsely low  lactic acid results can be found in samples   containing >=13.0 mg/dL total bilirubin and/or >=3.5 mg/dL   direct bilirubin.       Leukocytes, UA               Lymph #               Lymph%               Magnesium               MCH               MCHC               MCV               Microscopic Comment               Mono #               Mono%               MPV               Nitrite, UA               nRBC               Occult Blood UA               Opiate Scrn, Ur               Osmolality, Ur               pH, UA               Phosphorus               Platelets               POCT Glucose 133 129           Potassium               Prot/Creat Ratio, Ur               Total Protein               Protein, UA               Protein, Urine Random               Protime               RBC               RBC, UA               RDW               Sodium               Sodium Urine Random               Specific [place], UA               Specimen UA               Marijuana (THC) Metabolite               Toxicology Information               Urine Urea Nitrogen, Random               Vancomycin, Random               WBC, UA               WBC               Villegas's Stain, Ur                                10/27/18  1642   10/27/18  1640        Benzodiazepines Presumptive Positive       Methadone metabolites Negative       Phencyclidine Negative       Immature Granulocytes         Immature Grans (Abs)         Albumin         Alkaline Phosphatase         ALT         Amphetamine Screen, Ur Negative       Anion Gap         Appearance, UA   Cloudy     AST         Bacteria, UA Many       Barbiturate Screen, Ur Negative       Baso #         Basophil%         Bilirubin (UA)   2+  Comment:  Positive urine bilirubin is not confirmed. Correlate with   serum bilirubin and clinical presentation.       Total Bilirubin         Blood Culture, Routine         BUN, Bld         Calcium         Chloride         CO2         Cocaine (Metab.) Negative       Color,  UA   Jackelyn     Creatinine         Creatinine, Random Ur 117.0  Comment:  The random urine reference ranges provided were established   for 24 hour urine collections.  No reference ranges exist for  random urine specimens.  Correlate clinically.          117.0  Comment:  The random urine reference ranges provided were established   for 24 hour urine collections.  No reference ranges exist for  random urine specimens.  Correlate clinically.          117.0  Comment:  The random urine reference ranges provided were established   for 24 hour urine collections.  No reference ranges exist for  random urine specimens.  Correlate clinically.         Differential Method         eGFR if          eGFR if non          Eos #         Eosinophil%         Glucose         Glucose, UA   Negative     Gran # (ANC)         Gran%         Hematocrit         Hemoglobin         Hyaline Casts, UA 1       Coumadin Monitoring INR         Ketones, UA   Negative     Lactate, Bryant         Leukocytes, UA   1+     Lymph #         Lymph%         Magnesium         MCH         MCHC         MCV         Microscopic Comment SEE COMMENT  Comment:  Other formed elements not mentioned in the report are not   present in the microscopic examination.          Mono #         Mono%         MPV         Nitrite, UA   Negative     nRBC         Occult Blood UA   Negative     Opiate Scrn, Ur Negative       Osmolality, Ur 363  Comment:  The random urine reference ranges provided were established   for 24 hour urine collections.  No reference ranges exist for  random urine specimens.  Correlate clinically.         pH, UA   5.0     Phosphorus         Platelets         POCT Glucose         Potassium         Prot/Creat Ratio, Ur 0.26       Total Protein         Protein, UA   Negative  Comment:  Recommend a 24 hour urine protein or a urine   protein/creatinine ratio if globulin induced proteinuria is  clinically suspected.       Protein, Urine  Random 31  Comment:  The random urine reference ranges provided were established   for 24 hour urine collections.  No reference ranges exist for  random urine specimens.  Correlate clinically.         Protime         RBC         RBC, UA 1       RDW         Sodium         Sodium Urine Random <20  Comment:  The random urine reference ranges provided were established   for 24 hour urine collections.  No reference ranges exist for  random urine specimens.  Correlate clinically.          <20  Comment:  The random urine reference ranges provided were established   for 24 hour urine collections.  No reference ranges exist for  random urine specimens.  Correlate clinically.         Specific Gravity, UA   1.010     Specimen UA   Urine, Clean Catch     Marijuana (THC) Metabolite Negative       Toxicology Information SEE COMMENT  Comment:  This screen includes the following classes of drugs at the   listed cut-off:  Benzodiazepines                  200 ng/ml  Methadone                        300 ng/ml  Cocaine metabolite               300 ng/ml  Opiates                          300 ng/ml  Barbiturates                     200 ng/ml  Amphetamines                    1000 ng/ml  Marijuana metabs (THC)            50 ng/ml  Phencyclidine (PCP)               25 ng/ml  High concentrations of Diphenhydramine may cross-react with  Phencyclidine PCP screening immunoassay giving a false   positive result.  High concentrations of Methylenedioxymethamphetamine (MDMA aka  Ectasy) and other structurally similar compounds may cross-   react with the Amphetamine/Methamphetamine screening   immunoassay giving a false positive result.  A metabolite of the anti-HIV drug Sustiva () may cause  false positive results in the Marijuana metabolite (THC)   screening assay.  Note: This exception list includes only more common   interferants in toxicology screen testing.  Because of many   cross-reactantspositive results on toxicology drug screens    should be confirmed whenever results do not correlate with   clinical presentation.  This report is intended for use in clinical monitoring and  management of patients. It is not intended for use in   employment related drug testing.  Because of any cross-reactants, positive results on toxicology  drug screens should be confirmed whenever results do not  correlate with clinical presentation.  Presumptive positive results are unconfirmed and may be used   only for medical purposes.         Urine Urea Nitrogen, Random 533  Comment:  The random urine reference ranges provided were established   for 24 hour urine collections.  No reference ranges exist for  random urine specimens.  Correlate clinically.         Vancomycin, Random         WBC, UA 22       WBC         Villegas's Stain, Ur No eosinophils seen           All pertinent labs within the past 24 hours have been reviewed.    Significant Imaging: I have reviewed all pertinent imaging results/findings within the past 24 hours.

## 2018-10-28 NOTE — H&P
[facility]  Critical Care Medicine  History & Physical    Patient Name: Femi Enciso  MRN: [id number]  Admission Date: 10/27/2018  Hospital Length of Stay: 1 days  Code Status: Full Code  Attending Physician: Hai Wylie MD   Primary Care Provider: Primary Doctor No   Principal Problem: Acute liver failure without hepatic coma    Subjective:     HPI:  54 y/o man with DM2, recent diagnosis of liver failure likely secondary to EtOH abuse in 09/2018, DEL progressing to ESRD admitted to the medicine team for transplant evaluation.   Of note, he was previous in fair health until in September, N/V and diarrhea after sanwitch, resolved, then found have increasing jaundice. Went to hospital, found to have ALI, while he also got hypoxic, CT chest showed large pleural effusion, he was admitted, underwent thoracentesis(removed about 1L), cell study negative for malignancy, and he was treated for PNA and C.diff as well. However, he continue to have weight loss, and start to have AMS, readmitted to hospital, found to have DEL despite ALI. So he was transferred here for eval of liver tx.  Last drink was 9/21/2018.   Denies any fever or chills or chest pain or abdominal pain.           Hospital/ICU Course:  No notes on file     Past Medical History:   Diagnosis Date    Liver cirrhosis, alcoholic 09/30/2018       No past surgical history on file.    Review of patient's allergies indicates:   Allergen Reactions    Penicillins Nausea And Vomiting and Rash       Family History     None        Tobacco Use    Smoking status: Not on file   Substance and Sexual Activity    Alcohol use: Not on file    Drug use: Not on file    Sexual activity: Not on file      Review of Systems   Constitutional: Positive for activity change, appetite change, chills, fatigue and unexpected weight change. Negative for diaphoresis and fever.   HENT: Negative for congestion, drooling, ear discharge, facial swelling, hearing loss, sore  throat and tinnitus.    Eyes: Negative.         Icteral jaundice   Respiratory: Positive for shortness of breath. Negative for cough, choking, chest tightness and stridor.    Cardiovascular: Negative.  Negative for chest pain, palpitations and leg swelling.   Gastrointestinal: Positive for abdominal distention, diarrhea, nausea and vomiting. Negative for abdominal pain, anal bleeding, blood in stool and constipation.   Endocrine: Negative.  Negative for polydipsia, polyphagia and polyuria.   Genitourinary: Negative for difficulty urinating, dysuria, enuresis, flank pain, frequency and hematuria.   Musculoskeletal: Negative.  Negative for arthralgias, back pain, gait problem, joint swelling, neck pain and neck stiffness.   Skin: Negative.    Allergic/Immunologic: Negative.    Neurological: Positive for tremors and weakness. Negative for dizziness, seizures, syncope, facial asymmetry, speech difficulty, light-headedness, numbness and headaches.   Hematological: Negative.  Negative for adenopathy. Does not bruise/bleed easily.   Psychiatric/Behavioral: Positive for confusion. Negative for agitation, behavioral problems, decreased concentration, dysphoric mood, self-injury and sleep disturbance. The patient is not nervous/anxious and is not hyperactive.      Objective:     Vital Signs (Most Recent):  Temp: 98.4 °F (36.9 °C) (10/28/18 1231)  Pulse: 79 (10/28/18 1325)  Resp: 20 (10/28/18 1325)  BP: (!) 106/58 (10/28/18 1231)  SpO2: 96 % (10/28/18 1325) Vital Signs (24h Range):  Temp:  [96.3 °F (35.7 °C)-98.4 °F (36.9 °C)] 98.4 °F (36.9 °C)  Pulse:  [70-87] 79  Resp:  [16-24] 20  SpO2:  [91 %-100 %] 96 %  BP: (106-131)/(56-88) 106/58   Weight: 83.7 kg (184 lb 9.6 oz)  Body mass index is 26.49 kg/m².      Intake/Output Summary (Last 24 hours) at 10/28/2018 1439  Last data filed at 10/28/2018 0920  Gross per 24 hour   Intake 1450 ml   Output 351 ml   Net 1099 ml       Physical Exam   Constitutional: He is oriented to person,  place, and time. No distress.   HENT:   Head: Normocephalic and atraumatic.   Scleral jaundice present;  AAOx3 at the time, following commands   Eyes: EOM are normal. Right eye exhibits no discharge. Left eye exhibits no discharge. Scleral icterus is present.   Neck: Normal range of motion. Neck supple. No JVD present. No tracheal deviation present.   Cardiovascular: Normal rate and regular rhythm. Exam reveals no gallop and no friction rub.   No murmur heard.  Pulmonary/Chest: No stridor. No respiratory distress. He has no wheezes. He has rales. He exhibits no tenderness.   Mildly labored breathing  SOB, but maintaining sats on room air   Abdominal: Soft. Bowel sounds are normal. He exhibits distension. There is no tenderness. There is no rebound and no guarding. No hernia.   Musculoskeletal: Normal range of motion. He exhibits no edema, tenderness or deformity.   Lymphadenopathy:     He has no cervical adenopathy.   Neurological: He is alert and oriented to person, place, and time. He displays abnormal reflex. No cranial nerve deficit or sensory deficit. He exhibits abnormal muscle tone.   Sleepy, but follows commands appropriately   Skin: Skin is warm and dry. Capillary refill takes less than 2 seconds. He is not diaphoretic.   Jaundice       Vents:     Lines/Drains/Airways     Peripheral Intravenous Line                 Peripheral IV - Single Lumen 10/27/18 1456 Right Antecubital less than 1 day         Peripheral IV - Single Lumen 10/27/18 1600 Left Hand less than 1 day              Significant Labs:    CBC/Anemia Profile:  Recent Labs   Lab 10/27/18  1457 10/28/18  0453   WBC 15.80* 15.61*   HGB 13.4* 12.5*   HCT 40.1 36.9*   * 120*   * 96   RDW 19.3* 19.2*   IRON 101  --    FERRITIN 1,044*  --    FOLATE 16.4  --    [id number] >2000*  --         Chemistries:  Recent Labs   Lab 10/27/18  1457 10/28/18  0452   * 131*   K 4.6 4.4    106   CO2 11* 10*   * 123*   CREATININE 6.0*  6.3*   CALCIUM 8.0* 7.7*   ALBUMIN 1.9* 2.5*   PROT 6.2 5.9*   BILITOT 40.9* 41.8*   ALKPHOS 255* 205*   ALT 84* 67*   * 85*   MG 2.6 2.6   PHOS  --  8.3*       Recent Lab Results       10/28/18  1355   10/28/18  1215   10/28/18  0814   10/28/18  0453   10/28/18  0452        Benzodiazepines               Methadone metabolites               Phencyclidine               Immature Granulocytes       0.9       Immature Grans (Abs)       0.14  Comment:  Mild elevation in immature granulocytes is non specific and   can be seen in a variety of conditions including stress response,   acute inflammation, trauma and pregnancy. Correlation with other   laboratory and clinical findings is essential.         Albumin         2.5     Alkaline Phosphatase         205     ALT         67  Comment:  *Result may be interfered by hyperbilirubinemia     Amphetamine Screen, Ur               Anion Gap         15     Appearance, UA               AST         85     Bacteria, UA               Barbiturate Screen, Ur               Baso #       0.06       Basophil%       0.4       Bilirubin (UA)               Total Bilirubin         41.8  Comment:  For infants and newborns, interpretation of results should be based  on gestational age, weight and in agreement with clinical  observations.  Premature Infant recommended reference ranges:  Up to 24 hours.............<8.0 mg/dL  Up to 48 hours............<12.0 mg/dL  3-5 days..................<15.0 mg/dL  6-29 days.................<15.0 mg/dL       Blood Culture, Routine               BUN, Bld         123     Calcium         7.7     Chloride         106     CO2         10     Cocaine (Metab.)               Color, UA               Creatinine         6.3  Comment:  *Result may be interfered by hyperbilirubinemia     Creatinine, Random Ur               Differential Method       Automated       eGFR if          10.7     eGFR if non          9.2  Comment:  Calculation  used to obtain the estimated glomerular filtration  rate (eGFR) is the CKD-EPI equation.        Eos #       0.5       Eosinophil%       3.1       Glucose         115     Glucose, UA               Gran # (ANC)       12.7       Gran%       81.1       Hematocrit       36.9       Hemoglobin       12.5       Hyaline Casts, UA               Coumadin Monitoring INR       2.3  Comment:  Coumadin Therapy:  2.0 - 3.0 for INR for all indicators except mechanical heart valves  and antiphospholipid syndromes which should use 2.5 - 3.5.         Ketones, UA               Lactate, Bryant 1.4  Comment:  Falsely low lactic acid results can be found in samples   containing >=13.0 mg/dL total bilirubin and/or >=3.5 mg/dL   direct bilirubin.               Leukocytes, UA               Lymph #       0.9       Lymph%       5.4       Magnesium         2.6     MCH       32.6       MCHC       33.9       MCV       96       Microscopic Comment               Mono #       1.4       Mono%       9.1       MPV       12.5       Nitrite, UA               nRBC       0       Occult Blood UA               Opiate Scrn, Ur               Osmolality, Ur               pH, UA               Phosphorus         8.3     Platelets       120       POCT Glucose   133 104         Potassium         4.4     Prot/Creat Ratio, Ur               Total Protein         5.9     Protein, UA               Protein, Urine Random               Protime       22.1       RBC       3.83       RBC, UA               RDW       19.2       Sodium         131     Sodium Urine Random               Specific [place], UA               Specimen UA               Marijuana (THC) Metabolite               Toxicology Information               Urine Urea Nitrogen, Random               Vancomycin, Random         12.9     WBC, UA               WBC       15.61       Villegas's Stain, Ur                                10/27/18  2158   10/27/18  1848   10/27/18  1715   10/27/18  1702   10/27/18  1643         Benzodiazepines               Methadone metabolites               Phencyclidine               Immature Granulocytes               Immature Grans (Abs)               Albumin               Alkaline Phosphatase               ALT               Amphetamine Screen, Ur               Anion Gap               Appearance, UA               AST               Bacteria, UA               Barbiturate Screen, Ur               Baso #               Basophil%               Bilirubin (UA)               Total Bilirubin               Blood Culture, Routine     No Growth to date[P] No Growth to date[P]       BUN, Bld               Calcium               Chloride               CO2               Cocaine (Metab.)               Color, UA               Creatinine               Creatinine, Random Ur               Differential Method               eGFR if                eGFR if non                Eos #               Eosinophil%               Glucose               Glucose, UA               Gran # (ANC)               Gran%               Hematocrit               Hemoglobin               Hyaline Casts, UA               Coumadin Monitoring INR               Ketones, UA               Lactate, Bryant         1.4  Comment:  Falsely low lactic acid results can be found in samples   containing >=13.0 mg/dL total bilirubin and/or >=3.5 mg/dL   direct bilirubin.       Leukocytes, UA               Lymph #               Lymph%               Magnesium               MCH               MCHC               MCV               Microscopic Comment               Mono #               Mono%               MPV               Nitrite, UA               nRBC               Occult Blood UA               Opiate Scrn, Ur               Osmolality, Ur               pH, UA               Phosphorus               Platelets               POCT Glucose 133 129           Potassium               Prot/Creat Ratio, Ur               Total Protein               Protein,  UA               Protein, Urine Random               Protime               RBC               RBC, UA               RDW               Sodium               Sodium Urine Random               Specific [place], UA               Specimen UA               Marijuana (THC) Metabolite               Toxicology Information               Urine Urea Nitrogen, Random               Vancomycin, Random               WBC, UA               WBC               Villegas's Stain, Ur                                10/27/18  1642   10/27/18  1640        Benzodiazepines Presumptive Positive       Methadone metabolites Negative       Phencyclidine Negative       Immature Granulocytes         Immature Grans (Abs)         Albumin         Alkaline Phosphatase         ALT         Amphetamine Screen, Ur Negative       Anion Gap         Appearance, UA   Cloudy     AST         Bacteria, UA Many       Barbiturate Screen, Ur Negative       Baso #         Basophil%         Bilirubin (UA)   2+  Comment:  Positive urine bilirubin is not confirmed. Correlate with   serum bilirubin and clinical presentation.       Total Bilirubin         Blood Culture, Routine         BUN, Bld         Calcium         Chloride         CO2         Cocaine (Metab.) Negative       Color, UA   Jackelyn     Creatinine         Creatinine, Random Ur 117.0  Comment:  The random urine reference ranges provided were established   for 24 hour urine collections.  No reference ranges exist for  random urine specimens.  Correlate clinically.          117.0  Comment:  The random urine reference ranges provided were established   for 24 hour urine collections.  No reference ranges exist for  random urine specimens.  Correlate clinically.          117.0  Comment:  The random urine reference ranges provided were established   for 24 hour urine collections.  No reference ranges exist for  random urine specimens.  Correlate clinically.         Differential Method         eGFR if           eGFR if non          Eos #         Eosinophil%         Glucose         Glucose, UA   Negative     Gran # (ANC)         Gran%         Hematocrit         Hemoglobin         Hyaline Casts, UA 1       Coumadin Monitoring INR         Ketones, UA   Negative     Lactate, Bryant         Leukocytes, UA   1+     Lymph #         Lymph%         Magnesium         MCH         MCHC         MCV         Microscopic Comment SEE COMMENT  Comment:  Other formed elements not mentioned in the report are not   present in the microscopic examination.          Mono #         Mono%         MPV         Nitrite, UA   Negative     nRBC         Occult Blood UA   Negative     Opiate Scrn, Ur Negative       Osmolality, Ur 363  Comment:  The random urine reference ranges provided were established   for 24 hour urine collections.  No reference ranges exist for  random urine specimens.  Correlate clinically.         pH, UA   5.0     Phosphorus         Platelets         POCT Glucose         Potassium         Prot/Creat Ratio, Ur 0.26       Total Protein         Protein, UA   Negative  Comment:  Recommend a 24 hour urine protein or a urine   protein/creatinine ratio if globulin induced proteinuria is  clinically suspected.       Protein, Urine Random 31  Comment:  The random urine reference ranges provided were established   for 24 hour urine collections.  No reference ranges exist for  random urine specimens.  Correlate clinically.         Protime         RBC         RBC, UA 1       RDW         Sodium         Sodium Urine Random <20  Comment:  The random urine reference ranges provided were established   for 24 hour urine collections.  No reference ranges exist for  random urine specimens.  Correlate clinically.          <20  Comment:  The random urine reference ranges provided were established   for 24 hour urine collections.  No reference ranges exist for  random urine specimens.  Correlate clinically.         Specific [place], UA    1.010     Specimen UA   Urine, Clean Catch     Marijuana (THC) Metabolite Negative       Toxicology Information SEE COMMENT  Comment:  This screen includes the following classes of drugs at the   listed cut-off:  Benzodiazepines                  200 ng/ml  Methadone                        300 ng/ml  Cocaine metabolite               300 ng/ml  Opiates                          300 ng/ml  Barbiturates                     200 ng/ml  Amphetamines                    1000 ng/ml  Marijuana metabs (THC)            50 ng/ml  Phencyclidine (PCP)               25 ng/ml  High concentrations of Diphenhydramine may cross-react with  Phencyclidine PCP screening immunoassay giving a false   positive result.  High concentrations of Methylenedioxymethamphetamine (MDMA aka  Ectasy) and other structurally similar compounds may cross-   react with the Amphetamine/Methamphetamine screening   immunoassay giving a false positive result.  A metabolite of the anti-HIV drug Sustiva () may cause  false positive results in the Marijuana metabolite (THC)   screening assay.  Note: This exception list includes only more common   interferants in toxicology screen testing.  Because of many   cross-reactantspositive results on toxicology drug screens   should be confirmed whenever results do not correlate with   clinical presentation.  This report is intended for use in clinical monitoring and  management of patients. It is not intended for use in   employment related drug testing.  Because of any cross-reactants, positive results on toxicology  drug screens should be confirmed whenever results do not  correlate with clinical presentation.  Presumptive positive results are unconfirmed and may be used   only for medical purposes.         Urine Urea Nitrogen, Random 533  Comment:  The random urine reference ranges provided were established   for 24 hour urine collections.  No reference ranges exist for  random urine specimens.  Correlate  clinically.         Vancomycin, Random         WBC, UA 22       WBC         Villegas's Stain, Ur No eosinophils seen           All pertinent labs within the past 24 hours have been reviewed.    Significant Imaging: I have reviewed all pertinent imaging results/findings within the past 24 hours.    Assessment/Plan:     Psychiatric   Severe alcohol dependence    - Last drink on 09/21/2018  - Unlikely to have DTs  - Continue to monitor     Renal/   DEL (acute kidney injury)    - Worsening DEL, Uremia,    Will place dialysis line today and CRRT   F/u w nephrology  - Avoid nephrotoxic medications  - Renally adjust medications     ID   Sepsis    - Recent hospitalization w Rx for PNA and C.diff  - Leukocytosis at 13k,    Broad coverage w vanco and cefepime   F/u cultures, deescalate as needed     Hematology   Thrombocytopenia    - Likely due to liver dz  - no sign of bleeding  - Continue to monitor     Coagulopathy    - INR 2.3 on 10/28 upon admission to ICU   No signs of bleeding   Need thoracentesis and HD cath placed today, will transfuse 1u FFP     Oncology   Anemia of chronic disease    - h/h stable , continue to monitor     Endocrine   Type 2 diabetes mellitus without complication    - SSI  - Hold metformin at home     GI   * Acute liver failure without hepatic coma    Summary of serologies:  --Ferritin 1044, Iron 101, Transferrin 66, TIBC 98 Iron sat 103  --Cerruloplasmin-IP  --MAYA, ASMA-IP  --IgG 2065  --Acute hepatis panel-IP  --AFP-IP  --PETH-IP    - Continue albumin, midodrine, lactulose and rifaxime  - Continue abx,   - Hepatology consulted, follow up recs:    Recommendations:  --Daily CMP, CBC, and INR  --F/U Blood and urine cultures, small ascites on US  --Continue antibiotics  --Continue HRS protocol  --Continue lactulose and rifaximin  --Continue folic acid and thiamine   --Nephrology consult  --Addiction psych consult  --Nutrition consult  --PT and OT          Hepatic encephalopathy    - Continue lactulose  and rifaximine  - Monitor BM accordingly     Pleural effusion associated with hepatic disorder    - Will obtain thoracentesis today after FFP and send fluid for analysis     Hepatorenal syndrome    - Continue midodrine, albumin  - Maintain MAP>65     Decompensated hepatic cirrhosis    - see above         Critical Care Daily Checklist:    A: Awake: RASS Goal/Actual Goal:    Actual:     B: Spontaneous Breathing Trial Performed?     C: SAT & SBT Coordinated?  NA                      D: Delirium: CAM-ICU     E: Early Mobility Performed? Yes   F: Feeding Goal:    Status:     Current Diet Order   Procedures    Diet renal [facility]; High Protein/High Calorie; Fluid - 800mL     Order Specific Question:   Indicate patient location for additional diet options:     Answer:   [facility]     Order Specific Question:   Additional Diet Options:     Answer:   High Protein/High Calorie     Order Specific Question:   Fluid restriction:     Answer:   Fluid - 800mL      AS: Analgesia/Sedation na   T: Thromboembolic Prophylaxis None  INR 2.3   H: HOB > 300 Yes   U: Stress Ulcer Prophylaxis (if needed) protonix   G: Glucose Control insulin   B: Bowel Function Stool Occurrence: 1   I: Indwelling Catheter (Lines & Barton) Necessity Will place HD   D: De-escalation of Antimicrobials/Pharmacotherapies na    Plan for the day/ETD HD  CRRT    Code Status:  Family/Goals of Care: Full Code         Critical secondary to Patient is currently on drug therapy requiring intensive monitoring for toxicity: CRRT     Critical care was time spent personally by me on the following activities: development of treatment plan with patient or surrogate and bedside caregivers, discussions with consultants, evaluation of patient's response to treatment, examination of patient, ordering and performing treatments and interventions, ordering and review of laboratory studies, ordering and review of radiographic studies, pulse oximetry, re-evaluation of  patient's condition. This critical care time did not overlap with that of any other provider or involve time for any procedures.     Thalia Momin MD  Critical Care Medicine  [facility]

## 2018-10-28 NOTE — HPI
"53 yea rold male with a history of HTN, HLD, DM Type 2, Depression, and Alcohol Dependence on who Hepatology is being consulted decompensated alcoholic cirrhosis and liver transplant evaluation.     History obtain from chart review (records in care everywhere) and from speaking to daughter/wife who are both RN's.  Patient's wife states that he was in fair health until the end of September when they were on vacation (on 9/21/18 they went to [facility]).  She states that after eating a sandwhich, patient became very ill and started having diarrhea, nausea, and emesis (wife denies seeing patient consume alcohol during this time).  Eventually gastroenteritis cleared up, however patient became jaundice and went to go see his GI doctor and was found to have elevated LFTs and hypoxic and was admitted to their local hospital.  Patient had a large right sided effusion which was tapped (1L negative cytology) and he was treated for a PNA.  Patient had a non contrasted abdominal CT at that time which showed multiple hypodensities and US of the abdomen was recommend which no discrete lesions. Furthermore he was also started on steroids for alcoholic hepatitis which were stopped when patient did not improve.     Referral sent to [facility] for liver transplant evaluation and he was approved for outpatient evaluation as well as appointment with Dr. Sharma. On 10/26 [facility] nurse spoke to patient's wife and reported that patient was worsening our team recommend going to the hospital for admission. Family instead got on a commercial flight and brought patient to ED.     Social History:  Patient has been a binge drinker for ~20+ years. When he binges he drinks a 5th of Vodka per day for a couple of days and then stops. He drinks to create an "alternative reality" per wife. He was sober from 2559-1752. In 2014 daughter had a stroke and he subsequently entered an IP [facility] for Depression and Alcohol dependence. He also worked with a " counselor during this period who eventually discharged him from counseling. Of note has tried AA in the past but has not been effective. Family has not seen him drink in ~ 6 months however they did find a debit card transaction for alcohol purchase on 7/28/18 as well as an empty 5th of Vodka (this was after spending ~ 1 month with his daughter). He follows with a local PCP who did mention his LFT's were elevated last year and he subsequently stopped with resolution of his LFT's. No mention of cirrhosis until this September. In March 2018 he quit his job as a  as he was not happy with his job. Family feels that around this time is when he began to decline. Prior to September they reported intermittent episodes of confusion and inappropriateness. No DUI or incarcerations.    Summary of Care Everywhere:  Labs  10/2    A1AT (-)   Hep A antibody total + on 10/2 and - on 10/3   Hep A IgM -   Hep B core antibody total -   Hep B core IgM -   Hep B surface antibody and antigen -   Hep C antibody -   Hep E IgG and IgM -  10/3  C diff toxin B +  10/4   GI stool pathogen - (campy, salmonella, shigella, vibrio, yersinia, noro, rota)    Imaging/Cardiac Workup:  CT abdomen/pelvis 10/1/18  Liver: Enlarged liver measuring 22 cm in the midline.  There is multiple areas of low density within the liver.  Recommend a follow-up renal sonogram to rule out focal small hepatic lesions or cysts.  Biliary:  Mildly distended gallbladder.  Gallbladder wall appears thick.  No calcified gallstones are seen.  Cannot exclude sludge within the gallbladder as seen on image number 34.  Pancreas: Normal.  No lesion, fluid collection, ductal dilatation, or atrophy.  No pancreatitis or pseudocysts are seen.  Spleen: Mild splenomegaly noted.    ECHO 10/2/18  Left Ventricle-Mildly enlarged left ventricular cavity size. Normal left ventricular wall thickness. Normal left ventricular  systolic function. Left ventricular ejection  fraction is estimated at 65 %. No regional wall motion abnormalities. Normal diastolic function.    Right Ventricle-Normal right ventricular size and systolic function, RVSP 42.7 mmHg.

## 2018-10-28 NOTE — PROCEDURES
"Femi Enciso is a 53 y.o. male patient.    Temp: 98.4 °F (36.9 °C) (10/28/18 1231)  Pulse: 83 (10/28/18 1815)  Resp: (!) 24 (10/28/18 1815)  BP: 109/61 (10/28/18 1815)  SpO2: 95 % (10/28/18 1815)  Weight: 83.7 kg (184 lb 9.6 oz) (10/28/18 0318)  Height: 5' 10" (177.8 cm) (10/27/18 2100)       Central Line  Date/Time: 10/28/2018 6:23 PM  Location procedure was performed: [facility] CRITICAL CARE MEDICINE  Performed by: Gerson Buchanan MD  Assisting provider: Jina Momin MD  Pre-operative Diagnosis: Renal failure  Post-operative diagnosis: Renal failure  Consent Done: Yes  Time out: Immediately prior to procedure a "time out" was called to verify the correct patient, procedure, equipment, support staff and site/side marked as required.  Indications: hemodialysis  Anesthesia: local infiltration    Anesthesia:  Local Anesthetic: lidocaine 1% without epinephrine  Anesthetic total: 10 mL  Preparation: skin prepped with ChloraPrep  Skin prep agent dried: skin prep agent completely dried prior to procedure  Sterile barriers: all five maximum sterile barriers used - cap, mask, sterile gown, sterile gloves, and large sterile sheet  Hand hygiene: hand hygiene performed prior to central venous catheter insertion  Location details: right internal jugular  Catheter type: trialysis  Catheter size: 12 Fr  Catheter Length: 15cm    Ultrasound guidance: yes  Vessel Caliber: medium, patent, compressibility normal  Vascular Doppler: not done  Needle advanced into vessel with real time Ultrasound guidance.  Guidewire confirmed in vessel.  Sterile sheath used.  Manometry: Yes  Number of attempts: 2  Assessment: placement verified by x-ray  Complications: none  Estimated blood loss (mL): 5  Specimens: No  Implants: No  Post-procedure: line sutured,  chlorhexidine patch,  sterile dressing applied and blood return through all ports  Complications: No          Gerson Buchanan  10/28/2018  "

## 2018-10-28 NOTE — NURSING
Patient arrived to room 6097 @ ~1800. CCS team notified of admission, orders clarified. CXR obtained- ok to use Central Line. Dialysis consent obtained & Dialysis RN at bedside starting CRRT. FFP infusing. Patient denies pain/discomfort at this time despite complaining of feeling cold. Oriented x self, year, time, situation. Pt on room air sat ~94%. Denies SOB; lungs clear. Placed on cardiac monitor - Normotensive 100-110s systolic w/ HR NSR 80s. Patient abd distended, denies pain. Bowel sounds present. Patient voided via urinal on arrival- dark brown urine approx ~150cc. Skin noticeably jaundiced. PIV x2, Trialysis

## 2018-10-28 NOTE — CONSULTS
[facility]  Hepatology  Consult Note    Patient Name: Femi Enciso  MRN: [id number]  Admission Date: 10/27/2018  Hospital Length of Stay: 1 days  Attending Provider: Hai Wylie MD   Primary Care Physician: Primary Doctor No  Principal Problem:Acute liver failure without hepatic coma    Inpatient consult to Hepatology  Consult performed by: Sera Wu MD  Consult ordered by: aHi Wylie MD        Subjective:     Transplant status: No    HPI:  53 yea rold male with a history of HTN, HLD, DM Type 2, Depression, and Alcohol Dependence on who Hepatology is being consulted decompensated alcoholic cirrhosis and liver transplant evaluation.     History obtain from chart review (records in care everywhere) and from speaking to daughter/wife who are both RN's.  Patient's wife states that he was in fair health until the end of September when they were on vacation (on 9/21/18 they went to [facility]).  She states that after eating a sandwhich, patient became very ill and started having diarrhea, nausea, and emesis (wife denies seeing patient consume alcohol during this time).  Eventually  gastroenteritis cleared up, however patient became jaundice and went to go see his GI doctor and was found to have elevated LFTs and hypoxic and was admitted to their local hospital.  Patient had a large right sided effusion which was tapped (1L negative cytology) and he was treated for a PNA.  Patient had a non contrasted abdominal CT at that time which showed multiple hypodensities and US of the abdomen was recommend which no discrete lesions. Furthermore he was also started on steroids for alcoholic hepatitis which were stopped when patient did not improve.     Referral sent to [facility] for liver transplant evaluation and he was approved for outpatient evaluation as well as appointment with Dr. Sharma. On 10/26 [facility] nurse spoke to patient's wife and reported that patient was worsening our team recommend going to the  "hospital for admission. Family instead got on a commercial flight and brought patient to ED.     Social History:  Patient has been a binge drinker for ~20+ years. When he binges he drinks a 5th of Vodka per day for a couple of days and then stops. He drinks to create an "alternative reality" per wife. He was sober from 1713-6946. In 2014 daughter had a stroke and he subsequently entered an IP [facility] for Depression and Alcohol dependence. He also worked with a counselor during this period who eventually discharged him from counseling. Of note has tried AA in the past but has not been effective. Family has not seen him drink in ~ 6 months however they did find a debit card transaction for alcohol purchase on 7/28/18 as well as an empty 5th of Vodka (this was after spending ~ 1 month with his daughter). He follows with a local PCP who did mention his LFT's were elevated last year and he subsequently stopped with resolution of his LFT's. No mention of cirrhosis until this September. In March 2018 he quit his job as a  as he was not happy with his job. Family feels that around this time is when he began to decline. Prior to September they reported intermittent episodes of confusion and inappropriateness. No DUI or incarcerations.    Summary of Care Everywhere:  Labs  10/2    A1AT (-)   Hep A antibody total + on 10/2 and - on 10/3   Hep A IgM -   Hep B core antibody total -   Hep B core IgM -   Hep B surface antibody and antigen -   Hep C antibody -   Hep E IgG and IgM -  10/3  C diff toxin B +  10/4   GI stool pathogen - (campy, salmonella, shigella, vibrio, yersinia, noro, rota)    Imaging/Cardiac Workup:  CT abdomen/pelvis 10/1/18  Liver: Enlarged liver measuring 22 cm in the midline.  There is multiple areas of low density within the liver.  Recommend a follow-up renal sonogram to rule out focal small hepatic lesions or cysts.  Biliary:  Mildly distended gallbladder.  Gallbladder wall " appears thick.  No calcified gallstones are seen.  Cannot exclude sludge within the gallbladder as seen on image number 34.  Pancreas: Normal.  No lesion, fluid collection, ductal dilatation, or atrophy.  No pancreatitis or pseudocysts are seen.  Spleen: Mild splenomegaly noted.    ECHO 10/2/18  Left Ventricle-Mildly enlarged left ventricular cavity size. Normal left ventricular wall thickness. Normal left ventricular  systolic function. Left ventricular ejection fraction is estimated at 65 %. No regional wall motion abnormalities. Normal diastolic function.    Right Ventricle-Normal right ventricular size and systolic function, RVSP 42.7 mmHg.          Review of Systems   Unable to perform ROS: Mental status change       Past Medical History:   Diagnosis Date    Liver cirrhosis, alcoholic 09/30/2018       No past surgical history on file.    Family history of liver disease: No    Review of patient's allergies indicates:   Allergen Reactions    Penicillins Nausea And Vomiting and Rash       Tobacco Use    Smoking status: Not on file   Substance and Sexual Activity    Alcohol use: Not on file    Drug use: Not on file    Sexual activity: Not on file       No medications prior to admission.       Objective:     Vital Signs (Most Recent):  Temp: 98.4 °F (36.9 °C) (10/28/18 1231)  Pulse: 85 (10/28/18 1231)  Resp: 18 (10/28/18 1231)  BP: (!) 106/58 (10/28/18 1231)  SpO2: (!) 91 % (10/28/18 1231) Vital Signs (24h Range):  Temp:  [96.3 °F (35.7 °C)-98.4 °F (36.9 °C)] 98.4 °F (36.9 °C)  Pulse:  [70-87] 85  Resp:  [16-24] 18  SpO2:  [91 %-100 %] 91 %  BP: ()/(53-88) 106/58     Weight: 83.7 kg (184 lb 9.6 oz) (10/28/18 0318)  Body mass index is 26.49 kg/m².    Physical Exam   Constitutional: No distress.   HENT:   Head: Normocephalic and atraumatic.   Eyes: Scleral icterus is present.   Cardiovascular: Normal rate and regular rhythm.   Pulmonary/Chest: Effort normal and breath sounds normal.   Abdominal: Soft.  Bowel sounds are normal. He exhibits distension.   Musculoskeletal: He exhibits no edema.   Neurological:   Drowsy able to answer simple questions and follow commands   Skin: He is not diaphoretic.       MELD-Na score: 42 at 10/28/2018  4:53 AM  MELD score: 43 at 10/28/2018  4:53 AM  Calculated from:  Serum Creatinine: 6.3 mg/dL (Rounded to 4 mg/dL) at 10/28/2018  4:52 AM  Serum Sodium: 131 mmol/L at 10/28/2018  4:52 AM  Total Bilirubin: 41.8 mg/dL at 10/28/2018  4:52 AM  INR(ratio): 2.3 at 10/28/2018  4:53 AM  Age: 53 years    Significant Labs:  CBC:   Recent Labs   Lab 10/28/18  0453   WBC 15.61*   RBC 3.83*   HGB 12.5*   HCT 36.9*   *     CMP:   Recent Labs   Lab 10/28/18  0452   *   CALCIUM 7.7*   ALBUMIN 2.5*   PROT 5.9*   *   K 4.4   CO2 10*      *   CREATININE 6.3*   ALKPHOS 205*   ALT 67*   AST 85*   BILITOT 41.8*     Coagulation:   Recent Labs   Lab 10/28/18  0453   INR 2.3*       Significant Imaging:  X-Ray: Reviewed  US: Reviewed  CT: Reviewed    Assessment/Plan:     Decompensated hepatic cirrhosis    53 yea rold male with a history of HTN, HLD, DM Type 2, Depression, and Alcohol Dependence on who Hepatology is being consulted decompensated alcoholic cirrhosis and liver transplant evaluation. Patient with decompensated alcohol cirrhosis and a MELD of 42 therefore liver transplant would be the only live saving/curative measure. Of note patient with strong history of alcohol abuse and multiple complications at this time (UTI, uremia, HE, etc). Patient/family understand that although patient needs a liver he does need to undergo a liver transplant evaluation prior to selection committee and we are unsure how things will progress over the next couple of days due to how sick he is. Overall guarded prognosis.    Summary of serologies:  --Ferritin 1044, Iron 101, Transferrin 66, TIBC 98 Iron sat 103  --Cerruloplasmin-IP  --MAYA, ASMA-IP  --IgG 2065  --Acute hepatis  panel-IP  --AFP-IP  --PETH-IP    Recommendations:  --Daily CMP, CBC, and INR  --F/U Blood and urine cultures, small ascites on US  --Continue antibiotics  --Continue HRS protocol  --Continue lactulose and rifaximin  --Continue folic acid and thiamine   --Nephrology consult  --Addiction psych consult  --Nutrition consult  --PT and OT          Thank you for your consult. I will follow-up with patient. Please contact us if you have any additional questions.    Sera Wu M.D.  Gastroenterology Fellow, PGY-V  Pager: [phone]  [facility]

## 2018-10-28 NOTE — ASSESSMENT & PLAN NOTE
- INR 2.3 on 10/28 upon admission to ICU   No signs of bleeding   Need thoracentesis and HD cath placed today, will transfuse 1u FFP

## 2018-10-28 NOTE — SUBJECTIVE & OBJECTIVE
Review of Systems   Unable to perform ROS: Mental status change       Past Medical History:   Diagnosis Date    Liver cirrhosis, alcoholic 09/30/2018       No past surgical history on file.    Family history of liver disease: No    Review of patient's allergies indicates:   Allergen Reactions    Penicillins Nausea And Vomiting and Rash       Tobacco Use    Smoking status: Not on file   Substance and Sexual Activity    Alcohol use: Not on file    Drug use: Not on file    Sexual activity: Not on file       No medications prior to admission.       Objective:     Vital Signs (Most Recent):  Temp: 98.4 °F (36.9 °C) (10/28/18 1231)  Pulse: 85 (10/28/18 1231)  Resp: 18 (10/28/18 1231)  BP: (!) 106/58 (10/28/18 1231)  SpO2: (!) 91 % (10/28/18 1231) Vital Signs (24h Range):  Temp:  [96.3 °F (35.7 °C)-98.4 °F (36.9 °C)] 98.4 °F (36.9 °C)  Pulse:  [70-87] 85  Resp:  [16-24] 18  SpO2:  [91 %-100 %] 91 %  BP: ()/(53-88) 106/58     Weight: 83.7 kg (184 lb 9.6 oz) (10/28/18 0318)  Body mass index is 26.49 kg/m².    Physical Exam   Constitutional: No distress.   HENT:   Head: Normocephalic and atraumatic.   Eyes: Scleral icterus is present.   Cardiovascular: Normal rate and regular rhythm.   Pulmonary/Chest: Effort normal and breath sounds normal.   Abdominal: Soft. Bowel sounds are normal. He exhibits distension.   Musculoskeletal: He exhibits no edema.   Neurological:   Drowsy able to answer simple questions and follow commands   Skin: He is not diaphoretic.       MELD-Na score: 42 at 10/28/2018  4:53 AM  MELD score: 43 at 10/28/2018  4:53 AM  Calculated from:  Serum Creatinine: 6.3 mg/dL (Rounded to 4 mg/dL) at 10/28/2018  4:52 AM  Serum Sodium: 131 mmol/L at 10/28/2018  4:52 AM  Total Bilirubin: 41.8 mg/dL at 10/28/2018  4:52 AM  INR(ratio): 2.3 at 10/28/2018  4:53 AM  Age: 53 years    Significant Labs:  CBC:   Recent Labs   Lab 10/28/18  0453   WBC 15.61*   RBC 3.83*   HGB 12.5*   HCT 36.9*   *     CMP:    Recent Labs   Lab 10/28/18  0452   *   CALCIUM 7.7*   ALBUMIN 2.5*   PROT 5.9*   *   K 4.4   CO2 10*      *   CREATININE 6.3*   ALKPHOS 205*   ALT 67*   AST 85*   BILITOT 41.8*     Coagulation:   Recent Labs   Lab 10/28/18  0453   INR 2.3*       Significant Imaging:  X-Ray: Reviewed  US: Reviewed  CT: Reviewed

## 2018-10-28 NOTE — ASSESSMENT & PLAN NOTE
- Worsening DEL, Uremia,    Will place dialysis line today and CRRT   F/u w nephrology  - Avoid nephrotoxic medications  - Renally adjust medications

## 2018-10-28 NOTE — ASSESSMENT & PLAN NOTE
53 nick richardson male with a history of HTN, HLD, DM Type 2, Depression, and Alcohol Dependence on who Hepatology is being consulted decompensated alcoholic cirrhosis and liver transplant evaluation. Patient with decompensated alcohol cirrhosis and a MELD of 42 therefore liver transplant would be the only live saving/curative measure. Of note patient with strong history of alcohol abuse and multiple complications at this time (UTI, uremia, HE, etc). Patient/family understand that although patient needs a liver he does need to undergo a liver transplant evaluation prior to selection committee and we are unsure how things will progress over the next couple of days due to how sick he is. Overall guarded prognosis.    Summary of serologies:  --Ferritin 1044, Iron 101, Transferrin 66, TIBC 98 Iron sat 103  --Cerruloplasmin-IP  --MAYA, ASMA-IP  --IgG 2065  --Acute hepatis panel-IP  --AFP-IP  --PETH-IP    Recommendations:  --Daily CMP, CBC, and INR  --F/U Blood and urine cultures, small ascites on US  --Continue antibiotics  --Continue HRS protocol  --Continue lactulose and rifaximin  --Continue folic acid and thiamine   --Nephrology consult  --Addiction psych consult  --Nutrition consult  --PT and OT

## 2018-10-28 NOTE — NURSING
Pt arrived to unit via stretcher. VSS. AAOx4. Oriented to room. No needs at this time. Call light in reach. Will continue to monitor.

## 2018-10-28 NOTE — CONSULTS
Patient being transferred to CCS 2 service for CRRT in the setting of hypotension will be evaluated there

## 2018-10-28 NOTE — PLAN OF CARE
Problem: Patient Care Overview  Goal: Plan of Care Review  Outcome: Ongoing (interventions implemented as appropriate)  No acute changes overnight. VSS. Pt AOx4, but sometimes slow to respond and mumbles. NS infusing @ 100 cc/hr. Complained of nausea and vomited a small amount. Phenergan given with relief. BMx1. No complaints of pain. No needs at this time. Call bell in reach. Will continue to monitor.

## 2018-10-29 PROBLEM — A41.9 SEPSIS: Status: RESOLVED | Noted: 2018-10-27 | Resolved: 2018-10-29

## 2018-10-29 LAB
ALBUMIN SERPL BCP-MCNC: 2.5 G/DL
ALBUMIN SERPL BCP-MCNC: 2.5 G/DL
ALBUMIN SERPL BCP-MCNC: 2.7 G/DL
ALP SERPL-CCNC: 159 U/L
ALT SERPL W/O P-5'-P-CCNC: 64 U/L
ANA SER QL IF: NORMAL
ANION GAP SERPL CALC-SCNC: 14 MMOL/L
ANION GAP SERPL CALC-SCNC: 14 MMOL/L
ANION GAP SERPL CALC-SCNC: 7 MMOL/L
AST SERPL-CCNC: 94 U/L
BACTERIA UR CULT: NORMAL
BASOPHILS # BLD AUTO: 0.05 K/UL
BASOPHILS NFR BLD: 0.4 %
BILIRUB SERPL-MCNC: 39.3 MG/DL
BUN SERPL-MCNC: 10 MG/DL
BUN SERPL-MCNC: 104 MG/DL
BUN SERPL-MCNC: 98 MG/DL
CALCIUM SERPL-MCNC: 7.4 MG/DL
CALCIUM SERPL-MCNC: 7.4 MG/DL
CALCIUM SERPL-MCNC: 7.6 MG/DL
CHLORIDE SERPL-SCNC: 101 MMOL/L
CHLORIDE SERPL-SCNC: 106 MMOL/L
CHLORIDE SERPL-SCNC: 108 MMOL/L
CO2 SERPL-SCNC: 14 MMOL/L
CO2 SERPL-SCNC: 16 MMOL/L
CO2 SERPL-SCNC: 30 MMOL/L
CREAT SERPL-MCNC: 0.8 MG/DL
CREAT SERPL-MCNC: 5.4 MG/DL
CREAT SERPL-MCNC: 5.9 MG/DL
DIASTOLIC DYSFUNCTION: NO
DIFFERENTIAL METHOD: ABNORMAL
EOSINOPHIL # BLD AUTO: 0.4 K/UL
EOSINOPHIL NFR BLD: 3.7 %
ERYTHROCYTE [DISTWIDTH] IN BLOOD BY AUTOMATED COUNT: 19.1 %
EST. GFR  (AFRICAN AMERICAN): 11.6 ML/MIN/1.73 M^2
EST. GFR  (AFRICAN AMERICAN): 12.9 ML/MIN/1.73 M^2
EST. GFR  (AFRICAN AMERICAN): >60 ML/MIN/1.73 M^2
EST. GFR  (NON AFRICAN AMERICAN): 10 ML/MIN/1.73 M^2
EST. GFR  (NON AFRICAN AMERICAN): 11.1 ML/MIN/1.73 M^2
EST. GFR  (NON AFRICAN AMERICAN): >60 ML/MIN/1.73 M^2
ESTIMATED PA SYSTOLIC PRESSURE: 32.16
GLUCOSE SERPL-MCNC: 113 MG/DL
GLUCOSE SERPL-MCNC: 126 MG/DL
GLUCOSE SERPL-MCNC: 183 MG/DL
HAV IGM SERPL QL IA: NEGATIVE
HBV CORE IGM SERPL QL IA: NEGATIVE
HBV SURFACE AG SERPL QL IA: NEGATIVE
HCT VFR BLD AUTO: 32.4 %
HCV AB SERPL QL IA: NEGATIVE
HGB BLD-MCNC: 11.1 G/DL
IMM GRANULOCYTES # BLD AUTO: 0.07 K/UL
IMM GRANULOCYTES NFR BLD AUTO: 0.6 %
INR PPP: 2
LYMPHOCYTES # BLD AUTO: 0.8 K/UL
LYMPHOCYTES NFR BLD: 6.7 %
MAGNESIUM SERPL-MCNC: 1.6 MG/DL
MAGNESIUM SERPL-MCNC: 2.1 MG/DL
MAGNESIUM SERPL-MCNC: 2.4 MG/DL
MCH RBC QN AUTO: 32.8 PG
MCHC RBC AUTO-ENTMCNC: 34.3 G/DL
MCV RBC AUTO: 96 FL
MONOCYTES # BLD AUTO: 1.1 K/UL
MONOCYTES NFR BLD: 9.3 %
NEUTROPHILS # BLD AUTO: 9.3 K/UL
NEUTROPHILS NFR BLD: 79.3 %
NRBC BLD-RTO: 0 /100 WBC
PHOSPHATE SERPL-MCNC: 1.4 MG/DL
PHOSPHATE SERPL-MCNC: 6.3 MG/DL
PHOSPHATE SERPL-MCNC: 6.5 MG/DL
PLATELET # BLD AUTO: 83 K/UL
PMV BLD AUTO: 11.6 FL
POCT GLUCOSE: 121 MG/DL (ref 70–110)
POCT GLUCOSE: 124 MG/DL (ref 70–110)
POTASSIUM SERPL-SCNC: 3.8 MMOL/L
POTASSIUM SERPL-SCNC: 4 MMOL/L
POTASSIUM SERPL-SCNC: 4.1 MMOL/L
PROT SERPL-MCNC: 5.3 G/DL
PROTHROMBIN TIME: 19.8 SEC
RBC # BLD AUTO: 3.38 M/UL
RETIRED EF AND QEF - SEE NOTES: 73 (ref 55–65)
SMOOTH MUSCLE AB TITR SER IF: NORMAL {TITER}
SODIUM SERPL-SCNC: 136 MMOL/L
SODIUM SERPL-SCNC: 136 MMOL/L
SODIUM SERPL-SCNC: 138 MMOL/L
TRICUSPID VALVE REGURGITATION: NORMAL
VANCOMYCIN SERPL-MCNC: 23.1 UG/ML
WBC # BLD AUTO: 11.76 K/UL

## 2018-10-29 PROCEDURE — 99233 PR SUBSEQUENT HOSPITAL CARE,LEVL III: ICD-10-PCS | Mod: NTX,,, | Performed by: INTERNAL MEDICINE

## 2018-10-29 PROCEDURE — 20000000 HC ICU ROOM: Mod: NTX

## 2018-10-29 PROCEDURE — 94761 N-INVAS EAR/PLS OXIMETRY MLT: CPT | Mod: NTX

## 2018-10-29 PROCEDURE — 90945 DIALYSIS ONE EVALUATION: CPT | Mod: NTX

## 2018-10-29 PROCEDURE — 80053 COMPREHEN METABOLIC PANEL: CPT | Mod: NTX

## 2018-10-29 PROCEDURE — 25000003 PHARM REV CODE 250: Mod: NTX | Performed by: HOSPITALIST

## 2018-10-29 PROCEDURE — 93306 TTE W/DOPPLER COMPLETE: CPT | Mod: 26,NTX,, | Performed by: INTERNAL MEDICINE

## 2018-10-29 PROCEDURE — 80100008 HC CRRT DAILY MAINTENANCE: Mod: NTX

## 2018-10-29 PROCEDURE — 99231 PR SUBSEQUENT HOSPITAL CARE,LEVL I: ICD-10-PCS | Mod: NTX,,, | Performed by: INTERNAL MEDICINE

## 2018-10-29 PROCEDURE — 25000242 PHARM REV CODE 250 ALT 637 W/ HCPCS: Mod: NTX | Performed by: HOSPITALIST

## 2018-10-29 PROCEDURE — 99231 SBSQ HOSP IP/OBS SF/LOW 25: CPT | Mod: NTX,,, | Performed by: INTERNAL MEDICINE

## 2018-10-29 PROCEDURE — 99233 SBSQ HOSP IP/OBS HIGH 50: CPT | Mod: NTX,,, | Performed by: INTERNAL MEDICINE

## 2018-10-29 PROCEDURE — 80069 RENAL FUNCTION PANEL: CPT | Mod: 91,NTX

## 2018-10-29 PROCEDURE — 83735 ASSAY OF MAGNESIUM: CPT | Mod: NTX

## 2018-10-29 PROCEDURE — 85025 COMPLETE CBC W/AUTO DIFF WBC: CPT | Mod: NTX

## 2018-10-29 PROCEDURE — 63600175 PHARM REV CODE 636 W HCPCS: Mod: NTX | Performed by: STUDENT IN AN ORGANIZED HEALTH CARE EDUCATION/TRAINING PROGRAM

## 2018-10-29 PROCEDURE — 83735 ASSAY OF MAGNESIUM: CPT | Mod: 91,NTX

## 2018-10-29 PROCEDURE — 85610 PROTHROMBIN TIME: CPT | Mod: NTX

## 2018-10-29 PROCEDURE — 84100 ASSAY OF PHOSPHORUS: CPT | Mod: NTX

## 2018-10-29 PROCEDURE — 80202 ASSAY OF VANCOMYCIN: CPT | Mod: NTX

## 2018-10-29 PROCEDURE — 93306 2D ECHO WITH COLOR FLOW DOPPLER: ICD-10-PCS | Mod: 26,NTX,, | Performed by: INTERNAL MEDICINE

## 2018-10-29 PROCEDURE — 94640 AIRWAY INHALATION TREATMENT: CPT | Mod: NTX

## 2018-10-29 PROCEDURE — 93306 TTE W/DOPPLER COMPLETE: CPT | Mod: NTX

## 2018-10-29 PROCEDURE — 63600175 PHARM REV CODE 636 W HCPCS: Mod: NTX | Performed by: HOSPITALIST

## 2018-10-29 PROCEDURE — 27000221 HC OXYGEN, UP TO 24 HOURS: Mod: NTX

## 2018-10-29 PROCEDURE — 25000003 PHARM REV CODE 250: Mod: NTX | Performed by: INTERNAL MEDICINE

## 2018-10-29 RX ORDER — MAGNESIUM SULFATE HEPTAHYDRATE 40 MG/ML
2 INJECTION, SOLUTION INTRAVENOUS
Status: ACTIVE | OUTPATIENT
Start: 2018-10-29 | End: 2018-10-30

## 2018-10-29 RX ORDER — HEPARIN SODIUM 5000 [USP'U]/ML
5000 INJECTION, SOLUTION INTRAVENOUS; SUBCUTANEOUS EVERY 12 HOURS
Status: DISCONTINUED | OUTPATIENT
Start: 2018-10-29 | End: 2018-11-01

## 2018-10-29 RX ORDER — HYDROCODONE BITARTRATE AND ACETAMINOPHEN 500; 5 MG/1; MG/1
TABLET ORAL CONTINUOUS
Status: ACTIVE | OUTPATIENT
Start: 2018-10-29 | End: 2018-10-30

## 2018-10-29 RX ADMIN — LACTULOSE 30 G: 20 SOLUTION ORAL at 12:10

## 2018-10-29 RX ADMIN — LACTULOSE 30 G: 20 SOLUTION ORAL at 11:10

## 2018-10-29 RX ADMIN — IPRATROPIUM BROMIDE AND ALBUTEROL SULFATE 3 ML: .5; 3 SOLUTION RESPIRATORY (INHALATION) at 02:10

## 2018-10-29 RX ADMIN — Medication 100 MG: at 10:10

## 2018-10-29 RX ADMIN — SODIUM BICARBONATE 650 MG TABLET 1300 MG: at 10:10

## 2018-10-29 RX ADMIN — OCTREOTIDE ACETATE 100 MCG: 100 INJECTION, SOLUTION INTRAVENOUS; SUBCUTANEOUS at 11:10

## 2018-10-29 RX ADMIN — THERA TABS 1 TABLET: TAB at 10:10

## 2018-10-29 RX ADMIN — ONDANSETRON 8 MG: 8 TABLET, ORALLY DISINTEGRATING ORAL at 01:10

## 2018-10-29 RX ADMIN — LACTULOSE 30 G: 20 SOLUTION ORAL at 06:10

## 2018-10-29 RX ADMIN — LACTULOSE 30 G: 20 SOLUTION ORAL at 05:10

## 2018-10-29 RX ADMIN — PANTOPRAZOLE SODIUM 40 MG: 40 TABLET, DELAYED RELEASE ORAL at 10:10

## 2018-10-29 RX ADMIN — OCTREOTIDE ACETATE 100 MCG: 100 INJECTION, SOLUTION INTRAVENOUS; SUBCUTANEOUS at 03:10

## 2018-10-29 RX ADMIN — SODIUM BICARBONATE 650 MG TABLET 1300 MG: at 02:10

## 2018-10-29 RX ADMIN — OCTREOTIDE ACETATE 100 MCG: 100 INJECTION, SOLUTION INTRAVENOUS; SUBCUTANEOUS at 05:10

## 2018-10-29 RX ADMIN — RIFAXIMIN 550 MG: 550 TABLET ORAL at 10:10

## 2018-10-29 RX ADMIN — SODIUM CHLORIDE: 0.9 INJECTION, SOLUTION INTRAVENOUS at 02:10

## 2018-10-29 RX ADMIN — MIDODRINE HYDROCHLORIDE 10 MG: 5 TABLET ORAL at 08:10

## 2018-10-29 RX ADMIN — MIDODRINE HYDROCHLORIDE 10 MG: 5 TABLET ORAL at 10:10

## 2018-10-29 RX ADMIN — IPRATROPIUM BROMIDE AND ALBUTEROL SULFATE 3 ML: .5; 3 SOLUTION RESPIRATORY (INHALATION) at 08:10

## 2018-10-29 RX ADMIN — RIFAXIMIN 550 MG: 550 TABLET ORAL at 08:10

## 2018-10-29 RX ADMIN — SODIUM BICARBONATE 650 MG TABLET 1300 MG: at 08:10

## 2018-10-29 RX ADMIN — LIDOCAINE 1 PATCH: 50 PATCH CUTANEOUS at 08:10

## 2018-10-29 RX ADMIN — MIDODRINE HYDROCHLORIDE 10 MG: 5 TABLET ORAL at 03:10

## 2018-10-29 RX ADMIN — PHYTONADIONE 10 MG: 10 INJECTION, EMULSION INTRAMUSCULAR; INTRAVENOUS; SUBCUTANEOUS at 10:10

## 2018-10-29 RX ADMIN — FOLIC ACID 1 MG: 1 TABLET ORAL at 10:10

## 2018-10-29 RX ADMIN — CEFEPIME 2 G: 2 INJECTION, POWDER, FOR SOLUTION INTRAVENOUS at 08:10

## 2018-10-29 RX ADMIN — HEPARIN SODIUM 5000 UNITS: 5000 INJECTION, SOLUTION INTRAVENOUS; SUBCUTANEOUS at 08:10

## 2018-10-29 NOTE — ASSESSMENT & PLAN NOTE
53 nick richardson male with a history of HTN, HLD, DM Type 2, Depression, and Alcohol Dependence on who Hepatology is being consulted decompensated alcoholic cirrhosis and liver transplant evaluation. Patient with decompensated alcohol cirrhosis and a MELD of 42 therefore liver transplant would be the only live saving/curative measure. Of note patient with strong history of alcohol abuse and multiple complications at this time (UTI, uremia, HE, etc). Patient/family understand that although patient needs a liver he does need to undergo a liver transplant evaluation prior to selection committee and we are unsure how things will progress over the next couple of days due to how sick he is. Overall guarded prognosis.    Will obtain collateral information regarding previous liver transplant evaluation work-up at OSH.    Summary of serologies:  --Ferritin 1044, Iron 101, Transferrin 66, TIBC 98 Iron sat 103  --Cerruloplasmin-IP  --MAYA, ASMA-IP  --IgG 2065  --Acute hepatis panel-IP  --AFP-IP  --PETH-IP    Recommendations:  --Daily CMP, CBC, and INR  --F/U Blood and urine cultures, small ascites on US  --Continue antibiotics  --Continue HRS protocol  --Continue lactulose and rifaximin  --Continue folic acid and thiamine   --Addiction psych consult  --Nutrition consult  --PT and OT   -- Consider opening LT eval

## 2018-10-29 NOTE — ASSESSMENT & PLAN NOTE
MELD-Na score: 41 at 10/29/2018  1:58 PM  MELD score: 41 at 10/29/2018  1:58 PM  Calculated from:  Serum Creatinine: 5.9 mg/dL (Rounded to 4 mg/dL) at 10/29/2018  1:58 PM  Serum Sodium: 136 mmol/L at 10/29/2018  1:58 PM  Total Bilirubin: 39.3 mg/dL at 10/29/2018  3:16 AM  INR(ratio): 2 at 10/29/2018  3:16 AM  Age: 53 years    - Continue albumin, midodrine, lactulose and rifaximin  - Hepatology consulted, appreciate evaluation.

## 2018-10-29 NOTE — SUBJECTIVE & OBJECTIVE
Interval History/Significant Events: Dialyzed for 2 hours yesterday. Planning for repeat today. Much more awake and alert today.     Review of Systems   Unable to perform ROS: Mental status change     Objective:     Vital Signs (Most Recent):  Temp: 98.2 °F (36.8 °C) (10/29/18 1200)  Pulse: 88 (10/29/18 1600)  Resp: (!) 22 (10/29/18 1600)  BP: (!) 107/54 (10/29/18 1600)  SpO2: 95 % (10/29/18 1600) Vital Signs (24h Range):  Temp:  [97.7 °F (36.5 °C)-98.2 °F (36.8 °C)] 98.2 °F (36.8 °C)  Pulse:  [75-90] 88  Resp:  [18-32] 22  SpO2:  [92 %-97 %] 95 %  BP: ()/(54-71) 107/54   Weight: 83.7 kg (184 lb 9.6 oz)  Body mass index is 26.49 kg/m².      Intake/Output Summary (Last 24 hours) at 10/29/2018 1639  Last data filed at 10/29/2018 1600  Gross per 24 hour   Intake 880 ml   Output 2228 ml   Net -1348 ml       Physical Exam   Constitutional:   Thin, markedly jaundiced male in no distress   HENT:   Mouth/Throat: No oropharyngeal exudate.   Eyes: Scleral icterus is present.   Cardiovascular: Normal rate, regular rhythm and normal heart sounds.   Pulmonary/Chest: Effort normal.   Reduced breath sounds and dullness to percussion to right lung fields.    Abdominal: He exhibits distension. There is no tenderness. There is no rebound and no guarding.   Musculoskeletal: He exhibits no edema.   Skin: Skin is warm and dry.   Markedly jaundiced   Psychiatric: He has a normal mood and affect. His behavior is normal.   Nursing note and vitals reviewed.      Vents:     Lines/Drains/Airways     Central Venous Catheter Line                 Percutaneous Central Line Insertion/Assessment - triple lumen  10/28/18 1740 right internal jugular less than 1 day          Drain                 Rectal Tube 10/29/18 0134 less than 1 day          Peripheral Intravenous Line                 Peripheral IV - Single Lumen 10/27/18 1456 Right Antecubital 2 days         Peripheral IV - Single Lumen 10/27/18 1600 Left Hand 2 days               Significant Labs:    CBC/Anemia Profile:  Recent Labs   Lab 10/28/18  0453 10/29/18  0316   WBC 15.61* 11.76   HGB 12.5* 11.1*   HCT 36.9* 32.4*   * 83*   MCV 96 96   RDW 19.2* 19.1*        Chemistries:  Recent Labs   Lab 10/28/18  0452  10/28/18  2132 10/29/18  0316 10/29/18  1358   *   < > 134* 136 136   K 4.4   < > 4.0 3.8 4.1      < > 105 106 108   CO2 10*   < > 15* 16* 14*   *   < > 94* 98* 104*   CREATININE 6.3*   < > 5.1* 5.4* 5.9*   CALCIUM 7.7*   < > 7.7* 7.4* 7.4*   ALBUMIN 2.5*   < > 2.9* 2.7* 2.5*   PROT 5.9*  --   --  5.3*  --    BILITOT 41.8*  --   --  39.3*  --    ALKPHOS 205*  --   --  159*  --    ALT 67*  --   --  64*  --    AST 85*  --   --  94*  --    MG 2.6   < > 2.2 2.1 2.4   PHOS 8.3*   < > 6.0* 6.3* 6.5*    < > = values in this interval not displayed.         Significant Imaging:  CXR: I have reviewed all pertinent results/findings within the past 24 hours and my personal findings are:  Trialysis appropriately positioned. Worsening opacification of right lung fields

## 2018-10-29 NOTE — ASSESSMENT & PLAN NOTE
- INR 2.3 on 10/28 upon admission to ICU   No signs of bleeding   Vitamin K and given 2 doses of FFP. No procedures upcoming, will hold further FFP unless clinical situation changes.

## 2018-10-29 NOTE — PROGRESS NOTES
[facility]  Hepatology  Progress Note    Patient Name: Femi Enciso  MRN: [id number]  Admission Date: 10/27/2018  Hospital Length of Stay: 2 days  Attending Provider: Tk Carmona MD   Primary Care Physician: Primary Doctor No  Principal Problem:Acute liver failure without hepatic coma    Subjective:     Transplant status: No    HPI: 53 yea rold male with a history of HTN, HLD, DM Type 2, Depression, and Alcohol Dependence on who Hepatology is being consulted decompensated alcoholic cirrhosis and liver transplant evaluation.     History obtain from chart review (records in care everywhere) and from speaking to daughter/wife who are both RN's.  Patient's wife states that he was in fair health until the end of September when they were on vacation (on 9/21/18 they went to [facility]).  She states that after eating a sandwhich, patient became very ill and started having diarrhea, nausea, and emesis (wife denies seeing patient consume alcohol during this time).  Eventually  gastroenteritis cleared up, however patient became jaundice and went to go see his GI doctor and was found to have elevated LFTs and hypoxic and was admitted to their local hospital.  Patient had a large right sided effusion which was tapped (1L negative cytology) and he was treated for a PNA.  Patient had a non contrasted abdominal CT at that time which showed multiple hypodensities and US of the abdomen was recommend which no discrete lesions. Furthermore he was also started on steroids for alcoholic hepatitis which were stopped when patient did not improve.     Referral sent to [facility] for liver transplant evaluation and he was approved for outpatient evaluation as well as appointment with Dr. Sharma. On 10/26 [facility] nurse spoke to patient's wife and reported that patient was worsening our team recommend going to the hospital for admission. Family instead got on a commercial flight and brought patient to ED.     Social  "History:  Patient has been a binge drinker for ~20+ years. When he binges he drinks a 5th of Vodka per day for a couple of days and then stops. He drinks to create an "alternative reality" per wife. He was sober from [id number]. In 2014 daughter had a stroke and he subsequently entered an IP [facility] for Depression and Alcohol dependence. He also worked with a counselor during this period who eventually discharged him from counseling. Of note has tried AA in the past but has not been effective. Family has not seen him drink in ~ 6 months however they did find a debit card transaction for alcohol purchase on 7/28/18 as well as an empty 5th of Vodka (this was after spending ~ 1 month with his daughter). He follows with a local PCP who did mention his LFT's were elevated last year and he subsequently stopped with resolution of his LFT's. No mention of cirrhosis until this September. In March 2018 he quit his job as a  as he was not happy with his job. Family feels that around this time is when he began to decline. Prior to September they reported intermittent episodes of confusion and inappropriateness. No DUI or incarcerations.    Summary of Care Everywhere:  Labs  10/2    A1AT (-)   Hep A antibody total + on 10/2 and - on 10/3   Hep A IgM -   Hep B core antibody total -   Hep B core IgM -   Hep B surface antibody and antigen -   Hep C antibody -   Hep E IgG and IgM -  10/3  C diff toxin B +  10/4   GI stool pathogen - (campy, salmonella, shigella, vibrio, yersinia, noro, rota)    Imaging/Cardiac Workup:  CT abdomen/pelvis 10/1/18  Liver: Enlarged liver measuring 22 cm in the midline.  There is multiple areas of low density within the liver.  Recommend a follow-up renal sonogram to rule out focal small hepatic lesions or cysts.  Biliary:  Mildly distended gallbladder.  Gallbladder wall appears thick.  No calcified gallstones are seen.  Cannot exclude sludge within the gallbladder as seen on " image number 34.  Pancreas: Normal.  No lesion, fluid collection, ductal dilatation, or atrophy.  No pancreatitis or pseudocysts are seen.  Spleen: Mild splenomegaly noted.    ECHO 10/2/18  Left Ventricle-Mildly enlarged left ventricular cavity size. Normal left ventricular wall thickness. Normal left ventricular  systolic function. Left ventricular ejection fraction is estimated at 65 %. No regional wall motion abnormalities. Normal diastolic function.    Right Ventricle-Normal right ventricular size and systolic function, RVSP 42.7 mmHg.          Interval History: No acute events overnight. Mental status better today per nursing.    Current Facility-Administered Medications   Medication    0.9%  NaCl infusion (CRRT USE ONLY)    0.9%  NaCl infusion (for blood administration)    acetaminophen tablet 650 mg    albuterol-ipratropium 2.5 mg-0.5 mg/3 mL nebulizer solution 3 mL    ceFEPIme injection 2 g    dextrose 50% injection 12.5 g    dextrose 50% injection 25 g    folic acid tablet 1 mg    glucagon (human recombinant) injection 1 mg    glucose chewable tablet 16 g    glucose chewable tablet 24 g    insulin aspart U-100 pen 0-5 Units    lactulose 20 gram/30 mL solution Soln 30 g    lidocaine 5 % patch 1 patch    magnesium sulfate 2g in water 50mL IVPB (premix)    midodrine tablet 10 mg    multivitamin tablet 1 tablet    octreotide injection 100 mcg    ondansetron disintegrating tablet 8 mg    pantoprazole EC tablet 40 mg    promethazine (PHENERGAN) 12.5 mg in dextrose 5 % 50 mL IVPB    rifAXIMin tablet 550 mg    sevelamer carbonate tablet 800 mg    sodium bicarbonate tablet 1,300 mg    sodium chloride 0.9% flush 5 mL    sodium phosphate 20.01 mmol in dextrose 5 % 250 mL IVPB    sodium phosphate 30 mmol in dextrose 5 % 250 mL IVPB    sodium phosphate 39.99 mmol in dextrose 5 % 250 mL IVPB    thiamine tablet 100 mg    traMADol tablet 50 mg       Objective:     Vital Signs (Most  Recent):  Temp: 98.1 °F (36.7 °C) (10/29/18 0300)  Pulse: 84 (10/29/18 1100)  Resp: (!) 26 (10/29/18 1100)  BP: 111/65 (10/29/18 1100)  SpO2: (!) 94 % (10/29/18 1100) Vital Signs (24h Range):  Temp:  [97.7 °F (36.5 °C)-98.4 °F (36.9 °C)] 98.1 °F (36.7 °C)  Pulse:  [75-90] 84  Resp:  [18-32] 26  SpO2:  [91 %-96 %] 94 %  BP: ()/(55-71) 111/65     Weight: 83.7 kg (184 lb 9.6 oz) (10/28/18 0318)  Body mass index is 26.49 kg/m².    Physical Exam   Constitutional: He is oriented to person, place, and time. No distress.   Eyes: Scleral icterus is present.   Cardiovascular: Normal rate and regular rhythm.   Pulmonary/Chest: Effort normal and breath sounds normal.   Abdominal: Soft. Bowel sounds are normal. He exhibits no distension. There is no tenderness.   Musculoskeletal: He exhibits no edema or deformity.   Neurological: He is alert and oriented to person, place, and time.   Skin: Skin is warm and dry.   jaundiced   Psychiatric: He has a normal mood and affect.   Vitals reviewed.      MELD-Na score: 41 at 10/29/2018  3:16 AM  MELD score: 41 at 10/29/2018  3:16 AM  Calculated from:  Serum Creatinine: 5.4 mg/dL (Rounded to 4 mg/dL) at 10/29/2018  3:16 AM  Serum Sodium: 136 mmol/L at 10/29/2018  3:16 AM  Total Bilirubin: 39.3 mg/dL at 10/29/2018  3:16 AM  INR(ratio): 2 at 10/29/2018  3:16 AM  Age: 53 years    Significant Labs:  CBC:   Recent Labs   Lab 10/29/18  0316   WBC 11.76   RBC 3.38*   HGB 11.1*   HCT 32.4*   PLT 83*     CMP:   Recent Labs   Lab 10/29/18  0316   *   CALCIUM 7.4*   ALBUMIN 2.7*   PROT 5.3*      K 3.8   CO2 16*      BUN 98*   CREATININE 5.4*   ALKPHOS 159*   ALT 64*   AST 94*   BILITOT 39.3*     Coagulation:   Recent Labs   Lab 10/29/18  0316   INR 2.0*         Assessment/Plan:     Decompensated hepatic cirrhosis    53 yea rold male with a history of HTN, HLD, DM Type 2, Depression, and Alcohol Dependence on who Hepatology is being consulted decompensated alcoholic cirrhosis  and liver transplant evaluation. Patient with decompensated alcohol cirrhosis and a MELD of 42 therefore liver transplant would be the only live saving/curative measure. Of note patient with strong history of alcohol abuse and multiple complications at this time (UTI, uremia, HE, etc). Patient/family understand that although patient needs a liver he does need to undergo a liver transplant evaluation prior to selection committee and we are unsure how things will progress over the next couple of days due to how sick he is. Overall guarded prognosis.    Will obtain collateral information regarding previous liver transplant evaluation work-up at OSH.    Summary of serologies:  --Ferritin 1044, Iron 101, Transferrin 66, TIBC 98 Iron sat 103  --Cerruloplasmin-IP  --MAYA, ASMA-IP  --IgG 2065  --Acute hepatis panel-IP  --AFP-IP  --PETH-IP    Recommendations:  --Daily CMP, CBC, and INR  --F/U Blood and urine cultures, small ascites on US  --Continue antibiotics  --Continue HRS protocol  --Continue lactulose and rifaximin  --Continue folic acid and thiamine   --Addiction psych consult  --Nutrition consult  --PT and OT   -- Consider opening LT eval          Thank you for your consult. I will follow-up with patient. Please contact us if you have any additional questions.    Maurice Williamson MD  Hepatology  [facility]

## 2018-10-29 NOTE — PROGRESS NOTES
[facility]  Critical Care Medicine  Progress Note    Patient Name: Femi Enciso  MRN: [id number]  Admission Date: 10/27/2018  Hospital Length of Stay: 2 days  Code Status: Full Code  Attending Provider: Tk Carmona MD  Primary Care Provider: Primary Doctor No   Principal Problem: Acute liver failure without hepatic coma    Subjective:     HPI:  52 y/o man with DM2, recent diagnosis of liver failure likely secondary to EtOH abuse in 09/2018, DEL progressing to ESRD admitted to the medicine team for transplant evaluation.   Of note, he was previous in fair health until in September, N/V and diarrhea after sanwitch, resolved, then found have increasing jaundice. Went to hospital, found to have ALI, while he also got hypoxic, CT chest showed large pleural effusion, he was admitted, underwent thoracentesis(removed about 1L), cell study negative for malignancy, and he was treated for PNA and C.diff as well. However, he continue to have weight loss, and start to have AMS, readmitted to hospital, found to have DEL despite ALI. So he was transferred here for eval of liver tx.  Last drink was 9/21/2018.   Denies any fever or chills or chest pain or abdominal pain.           Hospital/ICU Course:  No notes on file    Interval History/Significant Events: Dialyzed for 2 hours yesterday. Planning for repeat today. Much more awake and alert today.     Review of Systems   Unable to perform ROS: Mental status change     Objective:     Vital Signs (Most Recent):  Temp: 98.2 °F (36.8 °C) (10/29/18 1200)  Pulse: 88 (10/29/18 1600)  Resp: (!) 22 (10/29/18 1600)  BP: (!) 107/54 (10/29/18 1600)  SpO2: 95 % (10/29/18 1600) Vital Signs (24h Range):  Temp:  [97.7 °F (36.5 °C)-98.2 °F (36.8 °C)] 98.2 °F (36.8 °C)  Pulse:  [75-90] 88  Resp:  [18-32] 22  SpO2:  [92 %-97 %] 95 %  BP: ()/(54-71) 107/54   Weight: 83.7 kg (184 lb 9.6 oz)  Body mass index is 26.49 kg/m².      Intake/Output Summary (Last 24 hours) at  10/29/2018 1639  Last data filed at 10/29/2018 1600  Gross per 24 hour   Intake 880 ml   Output 2228 ml   Net -1348 ml       Physical Exam   Constitutional:   Thin, markedly jaundiced male in no distress   HENT:   Mouth/Throat: No oropharyngeal exudate.   Eyes: Scleral icterus is present.   Cardiovascular: Normal rate, regular rhythm and normal heart sounds.   Pulmonary/Chest: Effort normal.   Reduced breath sounds and dullness to percussion to right lung fields.    Abdominal: He exhibits distension. There is no tenderness. There is no rebound and no guarding.   Musculoskeletal: He exhibits no edema.   Skin: Skin is warm and dry.   Markedly jaundiced   Psychiatric: He has a normal mood and affect. His behavior is normal.   Nursing note and vitals reviewed.      Vents:     Lines/Drains/Airways     Central Venous Catheter Line                 Percutaneous Central Line Insertion/Assessment - triple lumen  10/28/18 1740 right internal jugular less than 1 day          Drain                 Rectal Tube 10/29/18 0134 less than 1 day          Peripheral Intravenous Line                 Peripheral IV - Single Lumen 10/27/18 1456 Right Antecubital 2 days         Peripheral IV - Single Lumen 10/27/18 1600 Left Hand 2 days              Significant Labs:    CBC/Anemia Profile:  Recent Labs   Lab 10/28/18  0453 10/29/18  0316   WBC 15.61* 11.76   HGB 12.5* 11.1*   HCT 36.9* 32.4*   * 83*   MCV 96 96   RDW 19.2* 19.1*        Chemistries:  Recent Labs   Lab 10/28/18  0452  10/28/18  2132 10/29/18  0316 10/29/18  1358   *   < > 134* 136 136   K 4.4   < > 4.0 3.8 4.1      < > 105 106 108   CO2 10*   < > 15* 16* 14*   *   < > 94* 98* 104*   CREATININE 6.3*   < > 5.1* 5.4* 5.9*   CALCIUM 7.7*   < > 7.7* 7.4* 7.4*   ALBUMIN 2.5*   < > 2.9* 2.7* 2.5*   PROT 5.9*  --   --  5.3*  --    BILITOT 41.8*  --   --  39.3*  --    ALKPHOS 205*  --   --  159*  --    ALT 67*  --   --  64*  --    AST 85*  --   --  94*  --     MG 2.6   < > 2.2 2.1 2.4   PHOS 8.3*   < > 6.0* 6.3* 6.5*    < > = values in this interval not displayed.         Significant Imaging:  CXR: I have reviewed all pertinent results/findings within the past 24 hours and my personal findings are:  Trialysis appropriately positioned. Worsening opacification of right lung fields    Assessment/Plan:     Psychiatric   Severe alcohol dependence    - Last drink on 09/21/2018  - Outside window for severe withdrawal     Renal/   Acute cystitis without hematuria    Following urine cultures, will narrow antibiotics to sensitivities     DEL (acute kidney injury)    - Worsening DEL, Uremia,    CRRT for metabolic clearance, mentation markedly improved today.   - Avoid nephrotoxic medications  - Renally adjust medications     Hematology   Thrombocytopenia    - Likely due to liver dz  - no sign of bleeding  - Continue to monitor     Coagulopathy    - INR 2.3 on 10/28 upon admission to ICU   No signs of bleeding   Vitamin K and given 2 doses of FFP. No procedures upcoming, will hold further FFP unless clinical situation changes.      Oncology   Anemia of chronic disease    - h/h stable , continue to monitor     Endocrine   Type 2 diabetes mellitus without complication    - SSI  - Hold home metformin     GI   * Acute liver failure without hepatic coma    MELD-Na score: 41 at 10/29/2018  1:58 PM  MELD score: 41 at 10/29/2018  1:58 PM  Calculated from:  Serum Creatinine: 5.9 mg/dL (Rounded to 4 mg/dL) at 10/29/2018  1:58 PM  Serum Sodium: 136 mmol/L at 10/29/2018  1:58 PM  Total Bilirubin: 39.3 mg/dL at 10/29/2018  3:16 AM  INR(ratio): 2 at 10/29/2018  3:16 AM  Age: 53 years    - Continue albumin, midodrine, lactulose and rifaximin  - Hepatology consulted, appreciate evaluation.        Decompensated hepatic cirrhosis    - see above     Pleural effusion associated with hepatic disorder    - Likely hepatothorax. Will monitor as fluid is removed with dialysis. Would likely recur after  thoracentesis     Hepatic encephalopathy    - Continue lactulose and rifaximine  - titrate to 3-5 BMs daily     Hepatorenal syndrome    - Continue midodrine, albumin  - Maintain MAP>65  Dialysis per nephrology        Critical Care Daily Checklist:    A: Awake: RASS Goal/Actual Goal:    Actual: [place] Agitation Sedation Scale (RASS): Alert and calm   B: Spontaneous Breathing Trial Performed?     C: SAT & SBT Coordinated?  N/A                      D: Delirium: CAM-ICU Overall CAM-ICU: Negative   E: Early Mobility Performed? Yes   F: Feeding Goal:    Status:     Current Diet Order   Procedures    Diet renal [facility]; High Protein/High Calorie; Fluid - 800mL     Order Specific Question:   Indicate patient location for additional diet options:     Answer:   [facility]     Order Specific Question:   Additional Diet Options:     Answer:   High Protein/High Calorie     Order Specific Question:   Fluid restriction:     Answer:   Fluid - 800mL      AS: Analgesia/Sedation N/A   T: Thromboembolic Prophylaxis Heparin   H: HOB > 300 Yes   U: Stress Ulcer Prophylaxis (if needed) N/A   G: Glucose Control SSI   B: Bowel Function Stool Occurrence: 1   I: Indwelling Catheter (Lines & Barton) Necessity Central line right IJ   D: De-escalation of Antimicrobials/Pharmacotherapies Narrowing per cultures    Plan for the day/ETD Continue CRRT    Code Status:  Family/Goals of Care: Full Code         Critical secondary to Patient has a condition that poses threat to life and bodily function: Liver failure      Critical care was time spent personally by me on the following activities: development of treatment plan with patient or surrogate and bedside caregivers, discussions with consultants, evaluation of patient's response to treatment, examination of patient, ordering and performing treatments and interventions, ordering and review of laboratory studies, ordering and review of radiographic studies, pulse oximetry,  re-evaluation of patient's condition. This critical care time did not overlap with that of any other provider or involve time for any procedures.     Gerson Buchanan MD   Internal Medicine PGY-2  [phone]

## 2018-10-29 NOTE — PROGRESS NOTES
Please see staff attestation dated today for details. CRRT has been started for metabolic clearance.

## 2018-10-29 NOTE — PROGRESS NOTES
[facility]  Nephrology  Progress Note    Patient Name: Femi Enciso  MRN: [id number]  Admission Date: 10/27/2018  Hospital Length of Stay: 2 days  Attending Provider: Tk Carmona MD   Primary Care Physician: Primary Doctor No  Principal Problem:Acute liver failure without hepatic coma    Subjective:     HPI: 52 y/o man with DM2 presents to the ED with family for liver failure (likely due to EtOH abundant history of drinking - diagnosed in Sept 2018 in [place]).  He reports jaundice, generalized weakness, nausea, diarrhea, and decreased appetite since Sept 2018.  He is from [place], [place], and Dr. Sharma (patients physician) recommended bringing him to hospital for evaluation.  Patient denies any fever, chills, vomiting, chest pain, palpitations, SOB, abdominal pain.      Nephrology consulted for evaluation/management Adri.     Interval History: Started SLED overnight but clotted at 4.5 hrs and was not restarted he remains anuric Seems like MS is improving per Nursing staff, this is the first time seeing him. He is hemodynamically stable Off pressros. He had some clearance overnight and MS improved somewhat but still needs clearance. Fluid balance Net neg 544 ml overnight. Making some Urine,  ml overnight. Poor clearance overnight.     Review of patient's allergies indicates:   Allergen Reactions    Penicillins Nausea And Vomiting and Rash     Current Facility-Administered Medications   Medication Frequency    0.9%  NaCl infusion (CRRT USE ONLY) Continuous    0.9%  NaCl infusion (for blood administration) Q24H PRN    acetaminophen tablet 650 mg Q4H PRN    albuterol-ipratropium 2.5 mg-0.5 mg/3 mL nebulizer solution 3 mL Q6H WAKE    ceFEPIme injection 2 g Q24H    dextrose 50% injection 12.5 g PRN    dextrose 50% injection 25 g PRN    folic acid tablet 1 mg Daily    glucagon (human recombinant) injection 1 mg PRN    glucose chewable tablet 16 g PRN    glucose chewable tablet 24 g PRN     insulin aspart U-100 pen 0-5 Units QID (AC + HS) PRN    lactulose 20 gram/30 mL solution Soln 30 g Q6H    lidocaine 5 % patch 1 patch Q24H    magnesium sulfate 2g in water 50mL IVPB (premix) PRN    midodrine tablet 10 mg TID    multivitamin tablet 1 tablet Daily    octreotide injection 100 mcg Q8H    ondansetron disintegrating tablet 8 mg Q6H PRN    pantoprazole EC tablet 40 mg Daily    promethazine (PHENERGAN) 12.5 mg in dextrose 5 % 50 mL IVPB Q6H PRN    rifAXIMin tablet 550 mg BID    sevelamer carbonate tablet 800 mg TID WM    sodium bicarbonate tablet 1,300 mg TID    sodium chloride 0.9% flush 5 mL PRN    sodium phosphate 20.01 mmol in dextrose 5 % 250 mL IVPB PRN    sodium phosphate 30 mmol in dextrose 5 % 250 mL IVPB PRN    sodium phosphate 39.99 mmol in dextrose 5 % 250 mL IVPB PRN    thiamine tablet 100 mg Daily    traMADol tablet 50 mg Q8H PRN       Objective:     Vital Signs (Most Recent):  Temp: 98.1 °F (36.7 °C) (10/29/18 0300)  Pulse: 84 (10/29/18 1100)  Resp: (!) 26 (10/29/18 1100)  BP: 111/65 (10/29/18 1100)  SpO2: (!) 94 % (10/29/18 1100)  O2 Device (Oxygen Therapy): nasal cannula (10/29/18 0823) Vital Signs (24h Range):  Temp:  [97.7 °F (36.5 °C)-98.4 °F (36.9 °C)] 98.1 °F (36.7 °C)  Pulse:  [75-90] 84  Resp:  [18-32] 26  SpO2:  [91 %-96 %] 94 %  BP: ()/(55-71) 111/65     Weight: 83.7 kg (184 lb 9.6 oz) (10/28/18 0318)  Body mass index is 26.49 kg/m².  Body surface area is 2.03 meters squared.    I/O last 3 completed shifts:  In: 800 [P.O.:550; Blood:250]  Out: 1194 [Urine:201; Other:893; Stool:100]    Physical Exam   Constitutional: He appears well-developed. He appears cachectic. He is cooperative. No distress. Nasal cannula in place.   Temporal musc wasting   HENT:   Head: Normocephalic and atraumatic.   Eyes: Conjunctivae are normal. Pupils are equal, round, and reactive to light.   Neck: Trachea normal and normal range of motion. Neck supple. No JVD present.    Cardiovascular: Normal rate, regular rhythm, S1 normal, S2 normal and normal pulses. Exam reveals no gallop and no friction rub.   No murmur heard.  Pulmonary/Chest: Effort normal. He has decreased breath sounds in the right lower field and the left lower field. He has rhonchi in the right lower field and the left lower field.   Abdominal: Soft. Bowel sounds are normal. He exhibits distension. There is no tenderness.   Musculoskeletal: Normal range of motion. He exhibits no edema.   Neurological: He is alert.   Skin: Skin is warm and dry. Capillary refill takes less than 2 seconds.   Psychiatric: He has a normal mood and affect. His behavior is normal.   Vitals reviewed.      Significant Labs:  ABGs: No results for input(s): PH, PCO2, HCO3, POCSATURATED, BE in the last 168 hours.  BMP:   Recent Labs   Lab 10/29/18  0316   *      CO2 16*   BUN 98*   CREATININE 5.4*   CALCIUM 7.4*   MG 2.1     Cardiac Markers: No results for input(s): CKMB, TROPONINT, MYOGLOBIN in the last 168 hours.  CBC:   Recent Labs   Lab 10/29/18  0316   WBC 11.76   RBC 3.38*   HGB 11.1*   HCT 32.4*   PLT 83*   MCV 96   MCH 32.8*   MCHC 34.3     CMP:   Recent Labs   Lab 10/29/18  0316   *   CALCIUM 7.4*   ALBUMIN 2.7*   PROT 5.3*      K 3.8   CO2 16*      BUN 98*   CREATININE 5.4*   ALKPHOS 159*   ALT 64*   AST 94*   BILITOT 39.3*     Coagulation:   Recent Labs   Lab 10/29/18  0316   INR 2.0*     Recent Labs   Lab 10/27/18  1640 10/27/18  1642   COLORU Jackelyn  --    SPECGRAV 1.010  --    PHUR 5.0  --    PROTEINUA Negative  --    BACTERIA  --  Many*   NITRITE Negative  --    LEUKOCYTESUR 1+*  --    HYALINECASTS  --  1     All labs within the past 24 hours have been reviewed.     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   Plan:  - Initial response with volume expansion yield  ml x 2 last night but since then he has been anuric.  - Agree with Albumin admin 25% q 6 hrs x 4 doses and reassess in setting of low albumin   - started on RRT-SLEd overnight but clotted after 4.5 hrs will have dialysis nurse evaluate temporary catheter if cathter has good parameter will plan for SLED x 8 hrs sinc BUN very high amonia seems ok 73  - Renal US with adequate size kidneys no hydro  - UCx GNR > 100 K  - UPCR low  - Will evaluate Urine Sediment once sample is obtain  - Strict I/O and chart  - Avoid nephrotoxic medications  - Maintain MAP >65  - Hb > 7 gm/dL  - Medication doses adjusted to GFR           Thank you for your consult. I will follow-up with patient. Please contact us if you have any additional questions.    Nikolay Nino MD  Nephrology  [facility]

## 2018-10-29 NOTE — PROGRESS NOTES
Progress Note   Hospital Medicine         Patient Name: Femi Enciso  MRN:  [id number]  [facility] Medicine Team: [facility] L Hai Wylie MD  Date of Admission:  10/27/2018     Length of Stay:  LOS: 1 day   Expected Discharge Date: 10/31/2018  Principal Problem:  Acute liver failure without hepatic coma       Subjective:     Interval History/Overnight Events:  Patient seen and examined at the bedside; AAO times 3; wife and daughter at the bedside; plan to move to patient to ICU for SLED; planning for addiction psych in the AM and to initiate liver transplant evaluation     Review of Systems   Constitutional: Negative for chills, fatigue, fever.   HENT: Negative for sore throat, trouble swallowing.    Eyes: Negative for photophobia, visual disturbance.   Respiratory: Negative for cough, shortness of breath.    Cardiovascular: Negative for chest pain, palpitations, leg swelling.   Gastrointestinal: Negative for abdominal pain, constipation, diarrhea, nausea, vomiting.   Endocrine: Negative for cold intolerance, heat intolerance.   Genitourinary: Negative for dysuria, frequency.   Musculoskeletal: Negative for arthralgias, myalgias.   Skin: Negative for rash, wound, erythema   Neurological: Negative for dizziness, syncope, weakness, light-headedness.   Psychiatric/Behavioral: Negative for confusion, hallucinations, anxiety  All other systems reviewed and are negative.    Objective:     Temp:  [96.3 °F (35.7 °C)-98.4 °F (36.9 °C)]   Pulse:  [73-90]   Resp:  [18-32]   BP: (106-131)/(56-71)   SpO2:  [91 %-96 %]       Physical Exam:  Constitutional: patient appears ill and weak;   Head: Normocephalic and atraumatic.   Mouth/Throat: Oropharynx is clear and moist.   Eyes: EOM are normal. Pupils are equal, round, and reactive to light. Positive scleral icterus.   Neck: Normal range of motion. Neck supple.   Cardiovascular: Normal rate and regular rhythm.  No murmur heard.  Pulmonary/Chest: Effort normal and breath sounds  normal. No respiratory distress. No wheezes, rales, or rhonchi  Abdominal: Soft. Bowel sounds are normal.  No distension or tenderness  Musculoskeletal: Normal range of motion. No edema.   Neurological: Alert and oriented to person, place, and time.   Skin: Skin is warm and dry.   Psychiatric: Normal mood and affect. Behavior is normal.     Recent Labs   Lab 10/27/18  1457 10/28/18  0453   WBC 15.80* 15.61*   HGB 13.4* 12.5*   HCT 40.1 36.9*   * 120*     Recent Labs   Lab 10/27/18  1457 10/28/18  0452 10/28/18  1334   * 131* 132*   K 4.6 4.4 4.6    106 106   CO2 11* 10* 10*   * 123* 130*   CREATININE 6.0* 6.3* 6.7*   * 115* 133*   CALCIUM 8.0* 7.7* 7.6*   MG 2.6 2.6 2.6   PHOS  --  8.3* 8.9*     Recent Labs   Lab 10/27/18  1457 10/28/18  0452 10/28/18  0453 10/28/18  1334   ALKPHOS 255* 205*  --   --    ALT 84* 67*  --   --    * 85*  --   --    ALBUMIN 1.9* 2.5*  --  2.7*   PROT 6.2 5.9*  --   --    BILITOT 40.9* 41.8*  --   --    INR 2.2*  --  2.3*  --      Recent Labs   Lab 10/27/18  1848 10/27/18  2158 10/28/18  0814 10/28/18  1215   POCTGLUCOSE 129* 133* 104 133*        albumin human 25%  25 g Intravenous Q6H    albuterol-ipratropium  3 mL Nebulization Q6H WAKE    ceFEPime (MAXIPIME) IVPB  2 g Intravenous Q24H    folic acid  1 mg Oral Daily    lactulose  30 g Oral Q6H    lidocaine  1 patch Transdermal Q24H    midodrine  10 mg Oral TID    multivitamin  1 tablet Oral Daily    octreotide  100 mcg Intravenous Q8H    pantoprazole  40 mg Oral Daily    phytonadione ((AQUA-MEPHYTON) IVPB  10 mg Intravenous Daily    rifAXImin  550 mg Oral BID    sevelamer carbonate  800 mg Oral TID WM    sodium bicarbonate  1,300 mg Oral TID    thiamine  100 mg Oral Daily    vancomycin (VANCOCIN) IVPB  1,000 mg Intravenous Once       Assessment and Plan     Mr. Fmei Enciso is a 53 y.o. male who presented to [facility] on 10/27/2018 with     Hospital Course:    Mr. Femi Enciso was  admitted to Hospital Medicine for management of     Active Hospital Problems    Diagnosis  POA    *Acute liver failure without hepatic coma [K72.00]  Yes    Decompensated hepatic cirrhosis [K72.90]  Unknown    Alcoholic hepatitis with ascites [K70.11]  Yes    Acute liver failure [K72.00]  Yes    Jaundice [R17]  Yes    Hepatorenal syndrome [K76.7]  Yes    DEL (acute kidney injury) [N17.9]  Yes     Chronic    ATN (acute tubular necrosis) [N17.0]  Yes    Severe alcohol dependence [F10.20]  Yes    Coagulopathy [D68.9]  Yes    Anemia of chronic disease [D63.8]  Yes    Thrombocytopenia [D69.6]  Yes    Hyponatremia [E87.1]  Yes    Hepatic encephalopathy [K72.90]  Yes    Sepsis [A41.9]  Yes    Metabolic acidosis [E87.2]  Yes    Moderate protein malnutrition [E44.0]  Yes    Type 2 diabetes mellitus without complication [E11.9]  Yes    Acute cystitis without hematuria [N30.00]  Yes    Pleural effusion associated with hepatic disorder [K76.9, J91.8]  Yes      Resolved Hospital Problems   No resolved problems to display.        Acute liver failure without coma  Alcohol Hepatitis with ascites  MELD-Na score: 43 at 10/28/2018  1:34 PM  MELD score: 43 at 10/28/2018  1:34 PM  Calculated from:  Serum Creatinine: 6.7 mg/dL (Rounded to 4 mg/dL) at 10/28/2018  1:34 PM  Serum Sodium: 132 mmol/L at 10/28/2018  1:34 PM  Total Bilirubin: 41.8 mg/dL at 10/28/2018  4:52 AM  INR(ratio): 2.3 at 10/28/2018  4:53 AM  Age: 53 years    -The patient has a long history of alcohol use;  -He was flown here from [place] to be evaluated for a liver transplant per Dr. Sharma's recommendation  -Consult Hepatology  -PETH ordered  -Abdomen U/S with liver doppler reviewed       Acute kidney injury              -?Hepatorenal Syndrome ?ATN ?Pre-renal  -There are multiple etiologies; high bile salts causing ATN, as well as having a viral gastroenteritis causing severe dehydration   -Starting patient on midodrine, albumin and octreotide   -  nephrology consult, planning on starting SLED today in the ICU     Hepatic Encephalopathy  -Rule out infection as underlying cause as well  -Starting patient on lactulose to have 3 to 4 BMs daily              -lactulose 20 gram/30 mL solution Soln 20 g, 20 g, Oral, Q6H  -Continue home rifAXIMin tablet 550 mg, 550 mg, Oral, BID  -Will get CXR, blood cultures, LA, procal, U/A;   -He does have a known right sided pleural effusion on CXR  -Will give a dose of Vanc 1000mg and watch daily vanc levels and start patient on Cefepime   -Will need diagnostic tap      Pleural effusion associated with hepatic disorder, right sided pleural effusion  -Apparently was treated at OSH earlier this month for a PNA; had 1L drained at that time;  -Will get a CT chest without contrast for further evaluation  -Will get pulmonology consult and will likely need a thoracocentesis   -Bakari     Acute cystitis without hematuria  Leukocytosis  -Started on cefepime, follow up urine cultures               -ceFEPIme injection 2 g, 2 g, Intravenous, Q24H     Coagulopathy of liver disease  -Vitamin K for 3 days              -phytonadione vitamin k (AQUA-MEPHYTON) 10 mg in dextrose 5 % 50 mL IVPB, 10 mg, Intravenous, Daily     Anemia of chronic disease  -B12, Folate, Fe studies  -DIC labs     Thrombocytopenia due to sequestration  -Monitor and transfuse with any hemorrhage     Severe alcohol dependence   - PETH pending; will get U tox  - addiction psych consult for Monday   -thiamine tablet 100 mg, 100 mg, Oral, Daily     Hyponatremia  -Fluid restrict to 800 cc; urine and serum osmols     Moderate protein carissa malnutrition   -PAB, novasource, dietary      Metabolic Acidosis   -Starting on sodium bicarbonate tablet 1,300 mg, 1,300 mg, Oral, TID     DM2 without complication  -Hba1c; patient on metformin at home  -SSI      Diet:  Renal, fluid restriction 800 cc  GI PPx:    DVT PPx:    Goals of Care:  full     High Risk Conditions:  Liver failure, sepsis,  renal failure     Disposition:  transfer to the ICU today;           Hai Wylie MD  Medical Director [facility]  Spectra:  [phone]  Pager: [phone]

## 2018-10-29 NOTE — ASSESSMENT & PLAN NOTE
Following urine cultures, will narrow antibiotics to sensitivities   continue pre op rx and keep well hydrated

## 2018-10-29 NOTE — ASSESSMENT & PLAN NOTE
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   Plan:  - Initial response with volume expansion yield  ml x 2 last night but since then he has been anuric.  - Agree with Albumin admin 25% q 6 hrs x 4 doses and reassess in setting of low albumin   - started on RRT-SLEd overnight but clotted after 4.5 hrs will have dialysis nurse evaluate temporary catheter if cathter has good parameter will plan for SLED x 8 hrs sinc BUN very high amonia seems ok 73  - Renal US with adequate size kidneys no hydro  - UCx GNR > 100 K  - UPCR low  - Will evaluate Urine Sediment once sample is obtain  - Strict I/O and chart  - Avoid nephrotoxic medications  - Maintain MAP >65  - Hb > 7 gm/dL  - Medication doses adjusted to GFR

## 2018-10-29 NOTE — ASSESSMENT & PLAN NOTE
- Likely hepatothorax. Will monitor as fluid is removed with dialysis. Would likely recur after thoracentesis

## 2018-10-29 NOTE — SUBJECTIVE & OBJECTIVE
Interval History: Started SLED overnight but clotted at 4.5 hrs and was not restarted he remains anuric Seems like MS is improving per Nursing staff, this is the first time seeing him. He is hemodynamically stable Off pressros. He had some clearance overnight and MS improved somewhat but still needs clearance. Fluid balance Net neg 544 ml overnight. Making some Urine,  ml overnight. Poor clearance overnight.     Review of patient's allergies indicates:   Allergen Reactions    Penicillins Nausea And Vomiting and Rash     Current Facility-Administered Medications   Medication Frequency    0.9%  NaCl infusion (CRRT USE ONLY) Continuous    0.9%  NaCl infusion (for blood administration) Q24H PRN    acetaminophen tablet 650 mg Q4H PRN    albuterol-ipratropium 2.5 mg-0.5 mg/3 mL nebulizer solution 3 mL Q6H WAKE    ceFEPIme injection 2 g Q24H    dextrose 50% injection 12.5 g PRN    dextrose 50% injection 25 g PRN    folic acid tablet 1 mg Daily    glucagon (human recombinant) injection 1 mg PRN    glucose chewable tablet 16 g PRN    glucose chewable tablet 24 g PRN    insulin aspart U-100 pen 0-5 Units QID (AC + HS) PRN    lactulose 20 gram/30 mL solution Soln 30 g Q6H    lidocaine 5 % patch 1 patch Q24H    magnesium sulfate 2g in water 50mL IVPB (premix) PRN    midodrine tablet 10 mg TID    multivitamin tablet 1 tablet Daily    octreotide injection 100 mcg Q8H    ondansetron disintegrating tablet 8 mg Q6H PRN    pantoprazole EC tablet 40 mg Daily    promethazine (PHENERGAN) 12.5 mg in dextrose 5 % 50 mL IVPB Q6H PRN    rifAXIMin tablet 550 mg BID    sevelamer carbonate tablet 800 mg TID WM    sodium bicarbonate tablet 1,300 mg TID    sodium chloride 0.9% flush 5 mL PRN    sodium phosphate 20.01 mmol in dextrose 5 % 250 mL IVPB PRN    sodium phosphate 30 mmol in dextrose 5 % 250 mL IVPB PRN    sodium phosphate 39.99 mmol in dextrose 5 % 250 mL IVPB PRN    thiamine tablet 100 mg Daily     traMADol tablet 50 mg Q8H PRN       Objective:     Vital Signs (Most Recent):  Temp: 98.1 °F (36.7 °C) (10/29/18 0300)  Pulse: 84 (10/29/18 1100)  Resp: (!) 26 (10/29/18 1100)  BP: 111/65 (10/29/18 1100)  SpO2: (!) 94 % (10/29/18 1100)  O2 Device (Oxygen Therapy): nasal cannula (10/29/18 0823) Vital Signs (24h Range):  Temp:  [97.7 °F (36.5 °C)-98.4 °F (36.9 °C)] 98.1 °F (36.7 °C)  Pulse:  [75-90] 84  Resp:  [18-32] 26  SpO2:  [91 %-96 %] 94 %  BP: ()/(55-71) 111/65     Weight: 83.7 kg (184 lb 9.6 oz) (10/28/18 0318)  Body mass index is 26.49 kg/m².  Body surface area is 2.03 meters squared.    I/O last 3 completed shifts:  In: 800 [P.O.:550; Blood:250]  Out: 1194 [Urine:201; Other:893; Stool:100]    Physical Exam   Constitutional: He appears well-developed. He appears cachectic. He is cooperative. No distress. Nasal cannula in place.   Temporal musc wasting   HENT:   Head: Normocephalic and atraumatic.   Eyes: Conjunctivae are normal. Pupils are equal, round, and reactive to light.   Neck: Trachea normal and normal range of motion. Neck supple. No JVD present.   Cardiovascular: Normal rate, regular rhythm, S1 normal, S2 normal and normal pulses. Exam reveals no gallop and no friction rub.   No murmur heard.  Pulmonary/Chest: Effort normal. He has decreased breath sounds in the right lower field and the left lower field. He has rhonchi in the right lower field and the left lower field.   Abdominal: Soft. Bowel sounds are normal. He exhibits distension. There is no tenderness.   Musculoskeletal: Normal range of motion. He exhibits no edema.   Neurological: He is alert.   Skin: Skin is warm and dry. Capillary refill takes less than 2 seconds.   Psychiatric: He has a normal mood and affect. His behavior is normal.   Vitals reviewed.      Significant Labs:  ABGs: No results for input(s): PH, PCO2, HCO3, POCSATURATED, BE in the last 168 hours.  BMP:   Recent Labs   Lab 10/29/18  0316   *      CO2 16*    BUN 98*   CREATININE 5.4*   CALCIUM 7.4*   MG 2.1     Cardiac Markers: No results for input(s): CKMB, TROPONINT, MYOGLOBIN in the last 168 hours.  CBC:   Recent Labs   Lab 10/29/18  0316   WBC 11.76   RBC 3.38*   HGB 11.1*   HCT 32.4*   PLT 83*   MCV 96   MCH 32.8*   MCHC 34.3     CMP:   Recent Labs   Lab 10/29/18  0316   *   CALCIUM 7.4*   ALBUMIN 2.7*   PROT 5.3*      K 3.8   CO2 16*      BUN 98*   CREATININE 5.4*   ALKPHOS 159*   ALT 64*   AST 94*   BILITOT 39.3*     Coagulation:   Recent Labs   Lab 10/29/18  0316   INR 2.0*     Recent Labs   Lab 10/27/18  1640 10/27/18  1642   COLORU Jackelyn  --    SPECGRAV 1.010  --    PHUR 5.0  --    PROTEINUA Negative  --    BACTERIA  --  Many*   NITRITE Negative  --    LEUKOCYTESUR 1+*  --    HYALINECASTS  --  1     All labs within the past 24 hours have been reviewed.     Significant Imaging:  Labs: Reviewed  US: Reviewed

## 2018-10-29 NOTE — ASSESSMENT & PLAN NOTE
- Worsening DEL, Uremia,    CRRT for metabolic clearance, mentation markedly improved today.   - Avoid nephrotoxic medications  - Renally adjust medications

## 2018-10-29 NOTE — PLAN OF CARE
Problem: Patient Care Overview  Goal: Plan of Care Review  Outcome: Ongoing (interventions implemented as appropriate)  Patient VSS, no acute changes noted this shift. Patient received SLED for 4 hours, goal was for 6. CRRT clotted of @ 2300. U/O 200ml this shift. Medications administered as ordered. Liver transplant workup for today. Plan of care discussed with patient, all questions answered. WCTM see flowsheets for details.

## 2018-10-29 NOTE — SUBJECTIVE & OBJECTIVE
Interval History: No acute events overnight. Mental status better today per nursing.    Current Facility-Administered Medications   Medication    0.9%  NaCl infusion (CRRT USE ONLY)    0.9%  NaCl infusion (for blood administration)    acetaminophen tablet 650 mg    albuterol-ipratropium 2.5 mg-0.5 mg/3 mL nebulizer solution 3 mL    ceFEPIme injection 2 g    dextrose 50% injection 12.5 g    dextrose 50% injection 25 g    folic acid tablet 1 mg    glucagon (human recombinant) injection 1 mg    glucose chewable tablet 16 g    glucose chewable tablet 24 g    insulin aspart U-100 pen 0-5 Units    lactulose 20 gram/30 mL solution Soln 30 g    lidocaine 5 % patch 1 patch    magnesium sulfate 2g in water 50mL IVPB (premix)    midodrine tablet 10 mg    multivitamin tablet 1 tablet    octreotide injection 100 mcg    ondansetron disintegrating tablet 8 mg    pantoprazole EC tablet 40 mg    promethazine (PHENERGAN) 12.5 mg in dextrose 5 % 50 mL IVPB    rifAXIMin tablet 550 mg    sevelamer carbonate tablet 800 mg    sodium bicarbonate tablet 1,300 mg    sodium chloride 0.9% flush 5 mL    sodium phosphate 20.01 mmol in dextrose 5 % 250 mL IVPB    sodium phosphate 30 mmol in dextrose 5 % 250 mL IVPB    sodium phosphate 39.99 mmol in dextrose 5 % 250 mL IVPB    thiamine tablet 100 mg    traMADol tablet 50 mg       Objective:     Vital Signs (Most Recent):  Temp: 98.1 °F (36.7 °C) (10/29/18 0300)  Pulse: 84 (10/29/18 1100)  Resp: (!) 26 (10/29/18 1100)  BP: 111/65 (10/29/18 1100)  SpO2: (!) 94 % (10/29/18 1100) Vital Signs (24h Range):  Temp:  [97.7 °F (36.5 °C)-98.4 °F (36.9 °C)] 98.1 °F (36.7 °C)  Pulse:  [75-90] 84  Resp:  [18-32] 26  SpO2:  [91 %-96 %] 94 %  BP: ()/(55-71) 111/65     Weight: 83.7 kg (184 lb 9.6 oz) (10/28/18 0318)  Body mass index is 26.49 kg/m².    Physical Exam   Constitutional: He is oriented to person, place, and time. No distress.   Eyes: Scleral icterus is present.    Cardiovascular: Normal rate and regular rhythm.   Pulmonary/Chest: Effort normal and breath sounds normal.   Abdominal: Soft. Bowel sounds are normal. He exhibits no distension. There is no tenderness.   Musculoskeletal: He exhibits no edema or deformity.   Neurological: He is alert and oriented to person, place, and time.   Skin: Skin is warm and dry.   jaundiced   Psychiatric: He has a normal mood and affect.   Vitals reviewed.      MELD-Na score: 41 at 10/29/2018  3:16 AM  MELD score: 41 at 10/29/2018  3:16 AM  Calculated from:  Serum Creatinine: 5.4 mg/dL (Rounded to 4 mg/dL) at 10/29/2018  3:16 AM  Serum Sodium: 136 mmol/L at 10/29/2018  3:16 AM  Total Bilirubin: 39.3 mg/dL at 10/29/2018  3:16 AM  INR(ratio): 2 at 10/29/2018  3:16 AM  Age: 53 years    Significant Labs:  CBC:   Recent Labs   Lab 10/29/18  0316   WBC 11.76   RBC 3.38*   HGB 11.1*   HCT 32.4*   PLT 83*     CMP:   Recent Labs   Lab 10/29/18  0316   *   CALCIUM 7.4*   ALBUMIN 2.7*   PROT 5.3*      K 3.8   CO2 16*      BUN 98*   CREATININE 5.4*   ALKPHOS 159*   ALT 64*   AST 94*   BILITOT 39.3*     Coagulation:   Recent Labs   Lab 10/29/18  0316   INR 2.0*

## 2018-10-29 NOTE — PT/OT/SLP PROGRESS
Physical Therapy      Patient Name:  Femi Enciso   MRN:  [id number]    Patient not seen today secondary to Unavailable (Comment). Pt undergoing an ECHO.  Will follow-up as appropriate.    Lina Faulkner, PT   10/29/2018

## 2018-10-30 PROBLEM — F10.21 ALCOHOL USE DISORDER, SEVERE, IN EARLY REMISSION: Status: ACTIVE | Noted: 2018-10-30

## 2018-10-30 LAB
ALBUMIN SERPL BCP-MCNC: 2.3 G/DL
ALBUMIN SERPL BCP-MCNC: 2.4 G/DL
ALBUMIN SERPL BCP-MCNC: 2.4 G/DL
ALBUMIN SERPL BCP-MCNC: 2.5 G/DL
ALP SERPL-CCNC: 180 U/L
ALT SERPL W/O P-5'-P-CCNC: 60 U/L
ANION GAP SERPL CALC-SCNC: 10 MMOL/L
ANION GAP SERPL CALC-SCNC: 11 MMOL/L
AST SERPL-CCNC: 114 U/L
BASOPHILS # BLD AUTO: 0.05 K/UL
BASOPHILS NFR BLD: 0.5 %
BILIRUB SERPL-MCNC: 37.5 MG/DL
BUN SERPL-MCNC: 50 MG/DL
BUN SERPL-MCNC: 50 MG/DL
BUN SERPL-MCNC: 55 MG/DL
BUN SERPL-MCNC: 63 MG/DL
CALCIUM SERPL-MCNC: 7.5 MG/DL
CALCIUM SERPL-MCNC: 7.6 MG/DL
CALCIUM SERPL-MCNC: 7.6 MG/DL
CALCIUM SERPL-MCNC: 7.8 MG/DL
CHLORIDE SERPL-SCNC: 102 MMOL/L
CHLORIDE SERPL-SCNC: 104 MMOL/L
CO2 SERPL-SCNC: 23 MMOL/L
CO2 SERPL-SCNC: 24 MMOL/L
CREAT SERPL-MCNC: 4 MG/DL
CREAT SERPL-MCNC: 4 MG/DL
CREAT SERPL-MCNC: 4.6 MG/DL
CREAT SERPL-MCNC: 5.4 MG/DL
DIFFERENTIAL METHOD: ABNORMAL
EOSINOPHIL # BLD AUTO: 0.4 K/UL
EOSINOPHIL NFR BLD: 4.2 %
ERYTHROCYTE [DISTWIDTH] IN BLOOD BY AUTOMATED COUNT: 19.3 %
EST. GFR  (AFRICAN AMERICAN): 12.9 ML/MIN/1.73 M^2
EST. GFR  (AFRICAN AMERICAN): 15.6 ML/MIN/1.73 M^2
EST. GFR  (AFRICAN AMERICAN): 18.5 ML/MIN/1.73 M^2
EST. GFR  (AFRICAN AMERICAN): 18.5 ML/MIN/1.73 M^2
EST. GFR  (NON AFRICAN AMERICAN): 11.1 ML/MIN/1.73 M^2
EST. GFR  (NON AFRICAN AMERICAN): 13.5 ML/MIN/1.73 M^2
EST. GFR  (NON AFRICAN AMERICAN): 16 ML/MIN/1.73 M^2
EST. GFR  (NON AFRICAN AMERICAN): 16 ML/MIN/1.73 M^2
GLUCOSE SERPL-MCNC: 152 MG/DL
GLUCOSE SERPL-MCNC: 152 MG/DL
GLUCOSE SERPL-MCNC: 174 MG/DL
GLUCOSE SERPL-MCNC: 199 MG/DL
HCT VFR BLD AUTO: 31.4 %
HGB BLD-MCNC: 11.1 G/DL
IMM GRANULOCYTES # BLD AUTO: 0.11 K/UL
IMM GRANULOCYTES NFR BLD AUTO: 1.1 %
INR PPP: 2.3
LYMPHOCYTES # BLD AUTO: 0.9 K/UL
LYMPHOCYTES NFR BLD: 9 %
MAGNESIUM SERPL-MCNC: 2 MG/DL
MAGNESIUM SERPL-MCNC: 2 MG/DL
MAGNESIUM SERPL-MCNC: 2.3 MG/DL
MAGNESIUM SERPL-MCNC: 2.3 MG/DL
MCH RBC QN AUTO: 33.1 PG
MCHC RBC AUTO-ENTMCNC: 35.4 G/DL
MCV RBC AUTO: 94 FL
MONOCYTES # BLD AUTO: 1.4 K/UL
MONOCYTES NFR BLD: 13.6 %
NEUTROPHILS # BLD AUTO: 7.2 K/UL
NEUTROPHILS NFR BLD: 71.6 %
NRBC BLD-RTO: 0 /100 WBC
PHOSPHATE SERPL-MCNC: 4 MG/DL
PHOSPHATE SERPL-MCNC: 4 MG/DL
PHOSPHATE SERPL-MCNC: 4.2 MG/DL
PHOSPHATE SERPL-MCNC: 4.9 MG/DL
PLATELET # BLD AUTO: 63 K/UL
PMV BLD AUTO: 12.8 FL
POCT GLUCOSE: 170 MG/DL (ref 70–110)
POCT GLUCOSE: 172 MG/DL (ref 70–110)
POCT GLUCOSE: 173 MG/DL (ref 70–110)
POCT GLUCOSE: 198 MG/DL (ref 70–110)
POTASSIUM SERPL-SCNC: 3.6 MMOL/L
POTASSIUM SERPL-SCNC: 3.7 MMOL/L
POTASSIUM SERPL-SCNC: 3.8 MMOL/L
POTASSIUM SERPL-SCNC: 3.8 MMOL/L
PROT SERPL-MCNC: 5.1 G/DL
PROTHROMBIN TIME: 22.4 SEC
RBC # BLD AUTO: 3.35 M/UL
SODIUM SERPL-SCNC: 136 MMOL/L
SODIUM SERPL-SCNC: 138 MMOL/L
WBC # BLD AUTO: 10 K/UL

## 2018-10-30 PROCEDURE — 80053 COMPREHEN METABOLIC PANEL: CPT | Mod: NTX

## 2018-10-30 PROCEDURE — 83735 ASSAY OF MAGNESIUM: CPT | Mod: 91,NTX

## 2018-10-30 PROCEDURE — 32551 INSERTION OF CHEST TUBE: CPT | Mod: NTX

## 2018-10-30 PROCEDURE — 63600175 PHARM REV CODE 636 W HCPCS: Mod: NTX | Performed by: INTERNAL MEDICINE

## 2018-10-30 PROCEDURE — 25000003 PHARM REV CODE 250: Mod: NTX | Performed by: INTERNAL MEDICINE

## 2018-10-30 PROCEDURE — 27000221 HC OXYGEN, UP TO 24 HOURS: Mod: NTX

## 2018-10-30 PROCEDURE — 32551 INSERTION OF CHEST TUBE: CPT | Mod: LT,NTX,, | Performed by: INTERNAL MEDICINE

## 2018-10-30 PROCEDURE — 85025 COMPLETE CBC W/AUTO DIFF WBC: CPT | Mod: NTX

## 2018-10-30 PROCEDURE — 99222 1ST HOSP IP/OBS MODERATE 55: CPT | Mod: NTX,,, | Performed by: PSYCHIATRY & NEUROLOGY

## 2018-10-30 PROCEDURE — 63600175 PHARM REV CODE 636 W HCPCS: Mod: NTX | Performed by: STUDENT IN AN ORGANIZED HEALTH CARE EDUCATION/TRAINING PROGRAM

## 2018-10-30 PROCEDURE — 99233 PR SUBSEQUENT HOSPITAL CARE,LEVL III: ICD-10-PCS | Mod: NTX,,, | Performed by: INTERNAL MEDICINE

## 2018-10-30 PROCEDURE — 85610 PROTHROMBIN TIME: CPT | Mod: NTX

## 2018-10-30 PROCEDURE — 25000242 PHARM REV CODE 250 ALT 637 W/ HCPCS: Mod: NTX | Performed by: HOSPITALIST

## 2018-10-30 PROCEDURE — 94640 AIRWAY INHALATION TREATMENT: CPT | Mod: NTX

## 2018-10-30 PROCEDURE — 99222 PR INITIAL HOSPITAL CARE,LEVL II: ICD-10-PCS | Mod: NTX,,, | Performed by: PSYCHIATRY & NEUROLOGY

## 2018-10-30 PROCEDURE — 99233 SBSQ HOSP IP/OBS HIGH 50: CPT | Mod: NTX,,, | Performed by: INTERNAL MEDICINE

## 2018-10-30 PROCEDURE — 63600175 PHARM REV CODE 636 W HCPCS: Mod: NTX | Performed by: HOSPITALIST

## 2018-10-30 PROCEDURE — 99233 PR SUBSEQUENT HOSPITAL CARE,LEVL III: ICD-10-PCS | Mod: 25,NTX,, | Performed by: INTERNAL MEDICINE

## 2018-10-30 PROCEDURE — 20000000 HC ICU ROOM: Mod: NTX

## 2018-10-30 PROCEDURE — 80069 RENAL FUNCTION PANEL: CPT | Mod: NTX

## 2018-10-30 PROCEDURE — 83735 ASSAY OF MAGNESIUM: CPT | Mod: NTX

## 2018-10-30 PROCEDURE — 97110 THERAPEUTIC EXERCISES: CPT | Mod: NTX

## 2018-10-30 PROCEDURE — 99233 SBSQ HOSP IP/OBS HIGH 50: CPT | Mod: 25,NTX,, | Performed by: INTERNAL MEDICINE

## 2018-10-30 PROCEDURE — 84100 ASSAY OF PHOSPHORUS: CPT | Mod: NTX

## 2018-10-30 PROCEDURE — 32551 PR TUBE THORACOSTOMY INCLUDES WATER SEAL: ICD-10-PCS | Mod: LT,NTX,, | Performed by: INTERNAL MEDICINE

## 2018-10-30 PROCEDURE — 25000003 PHARM REV CODE 250: Mod: NTX | Performed by: STUDENT IN AN ORGANIZED HEALTH CARE EDUCATION/TRAINING PROGRAM

## 2018-10-30 PROCEDURE — 94761 N-INVAS EAR/PLS OXIMETRY MLT: CPT | Mod: NTX

## 2018-10-30 PROCEDURE — 25000003 PHARM REV CODE 250: Mod: NTX | Performed by: HOSPITALIST

## 2018-10-30 PROCEDURE — 97161 PT EVAL LOW COMPLEX 20 MIN: CPT | Mod: NTX

## 2018-10-30 RX ORDER — CEFEPIME HYDROCHLORIDE 1 G/1
1 INJECTION, POWDER, FOR SOLUTION INTRAMUSCULAR; INTRAVENOUS
Status: DISCONTINUED | OUTPATIENT
Start: 2018-10-30 | End: 2018-10-31

## 2018-10-30 RX ORDER — LACTULOSE 10 G/15ML
30 SOLUTION ORAL 3 TIMES DAILY
Status: DISCONTINUED | OUTPATIENT
Start: 2018-10-30 | End: 2018-10-31

## 2018-10-30 RX ORDER — LIDOCAINE HYDROCHLORIDE 10 MG/ML
10 INJECTION INFILTRATION; PERINEURAL ONCE
Status: DISCONTINUED | OUTPATIENT
Start: 2018-10-30 | End: 2018-10-31

## 2018-10-30 RX ADMIN — IPRATROPIUM BROMIDE AND ALBUTEROL SULFATE 3 ML: .5; 3 SOLUTION RESPIRATORY (INHALATION) at 03:10

## 2018-10-30 RX ADMIN — Medication 100 MG: at 08:10

## 2018-10-30 RX ADMIN — PANTOPRAZOLE SODIUM 40 MG: 40 TABLET, DELAYED RELEASE ORAL at 08:10

## 2018-10-30 RX ADMIN — HEPARIN SODIUM 5000 UNITS: 5000 INJECTION, SOLUTION INTRAVENOUS; SUBCUTANEOUS at 09:10

## 2018-10-30 RX ADMIN — MIDODRINE HYDROCHLORIDE 10 MG: 5 TABLET ORAL at 02:10

## 2018-10-30 RX ADMIN — OCTREOTIDE ACETATE 100 MCG: 100 INJECTION, SOLUTION INTRAVENOUS; SUBCUTANEOUS at 07:10

## 2018-10-30 RX ADMIN — MIDODRINE HYDROCHLORIDE 10 MG: 5 TABLET ORAL at 09:10

## 2018-10-30 RX ADMIN — FOLIC ACID 1 MG: 1 TABLET ORAL at 08:10

## 2018-10-30 RX ADMIN — SODIUM BICARBONATE 650 MG TABLET 1300 MG: at 08:10

## 2018-10-30 RX ADMIN — LACTULOSE 30 G: 20 SOLUTION ORAL at 02:10

## 2018-10-30 RX ADMIN — MIDODRINE HYDROCHLORIDE 10 MG: 5 TABLET ORAL at 08:10

## 2018-10-30 RX ADMIN — IPRATROPIUM BROMIDE AND ALBUTEROL SULFATE 3 ML: .5; 3 SOLUTION RESPIRATORY (INHALATION) at 08:10

## 2018-10-30 RX ADMIN — HEPARIN SODIUM 5000 UNITS: 5000 INJECTION, SOLUTION INTRAVENOUS; SUBCUTANEOUS at 08:10

## 2018-10-30 RX ADMIN — SODIUM BICARBONATE 650 MG TABLET 1300 MG: at 02:10

## 2018-10-30 RX ADMIN — CEFEPIME 1 G: 1 INJECTION, POWDER, FOR SOLUTION INTRAMUSCULAR; INTRAVENOUS at 09:10

## 2018-10-30 RX ADMIN — TRAMADOL HYDROCHLORIDE 50 MG: 50 TABLET, FILM COATED ORAL at 06:10

## 2018-10-30 RX ADMIN — IPRATROPIUM BROMIDE AND ALBUTEROL SULFATE 3 ML: .5; 3 SOLUTION RESPIRATORY (INHALATION) at 09:10

## 2018-10-30 RX ADMIN — LACTULOSE 30 G: 20 SOLUTION ORAL at 07:10

## 2018-10-30 RX ADMIN — THERA TABS 1 TABLET: TAB at 08:10

## 2018-10-30 RX ADMIN — LACTULOSE 30 G: 20 SOLUTION ORAL at 11:10

## 2018-10-30 RX ADMIN — RIFAXIMIN 550 MG: 550 TABLET ORAL at 08:10

## 2018-10-30 RX ADMIN — RIFAXIMIN 550 MG: 550 TABLET ORAL at 09:10

## 2018-10-30 RX ADMIN — SODIUM BICARBONATE 650 MG TABLET 1300 MG: at 09:10

## 2018-10-30 RX ADMIN — LACTULOSE 30 G: 20 SOLUTION ORAL at 09:10

## 2018-10-30 NOTE — PLAN OF CARE
Problem: Physical Therapy Goal  Goal: Physical Therapy Goal  Goals to be met by: 2018     Patient will increase functional independence with mobility by performin. Supine to sit with Modified [place]  2. Sit to stand transfer with [place]  3. Bed to chair transfer with independence using no AD  4. Gait  x 50 feet with Supervision using LRAD.   5. Lower extremity exercise program x20 reps per handout, with independence    Outcome: Ongoing (interventions implemented as appropriate)  Pt evaluated and appropriate goals established.

## 2018-10-30 NOTE — ASSESSMENT & PLAN NOTE
- Worsening DEL, Uremia,    CRRT for metabolic clearance, mentation markedly improved today.    Will need to transition to HD if BP allows  - Avoid nephrotoxic medications  - Renally adjust medications

## 2018-10-30 NOTE — ASSESSMENT & PLAN NOTE
ASSESSMENT     Femi Enciso is a 53 y.o. male with a past psychiatric history of alcohol use disorder and anxiety, who [place] [facility] [place]A likely 2/2 EtOH abuse. Psychiatry was consulted to address the patient's symptoms of substance abuse and candidacy for liver transplant. Pt reports discovering approx 5 yrs ago from his PCP that he had abnormal laboratory findings (transaminitis) indicative of EtOH abuse, and advised that he needed to stop drinking. Thereafter, pt reports a period of 2.5yrs of sobriety in the setting of outpatient counseling and AA meetings, only to relapse, thereafter, following a CVA experienced by his daughter. Pt claims said experience led him into a bout of depression, which required a brief inpatient psychiatric hospitalization for medical stabilization, however he continued to drink, until 9/21/2018, when he was hospitalized and diagnosed with C diff colitis, ALI, and DEL. Pt was then [place] [facility] [place]. Pt exhibits stable/improving inisght/judgment into his addiction evidenced by his decision to quit drinking, upon most recent hospitalization and discovery of alcohol induced liver dysfunction. Pt also possesses strong familial support evidenced by multiple family members at bedside, willing to provide pt with any/all support required to in aid a full recovery. Pt willing to remain abstinent from EtOH, attend and complete an IOP program, receive serial PETH and Utox screening, as well as attend AA meetings, thereafter.     IMPRESSION  Alcohol use disorder, severe, in early remission  Anxiety disorder, unspecified  Depressive disorder, unspecified    RECOMMENDATION(S)     1. Scheduled Medication(s):  Per primary team    2. PRN Medication(s):  Per primary team    3. Other:  · Moderate risk at this time  · Recommend serial PETH testing  · Recommend serial urine toxicology screening  · Recommend referral to and completion of day program for education on alcohol  abuse, for coping/behavioral skills to reinforce sobriety, as well as to attend AA meetings.  · PETH pending

## 2018-10-30 NOTE — CONSULTS
[facility]  Psychiatry  Consult Note    Patient Name: Femi Enciso  MRN: [id number]   Code Status: Full Code  Admission Date: 10/27/2018  Hospital Length of Stay: 3 days  Attending Physician: Tk Carmona MD  Primary Care Provider: Primary Doctor No    Current Legal Status: N/A    Patient information was obtained from patient, spouse/SO, relative(s) and ER records.   Consults  Subjective:     Principal Problem:Acute liver failure without hepatic coma    Chief Complaint:  Liver Transplant Evaluation    HPI: Femi Enciso is a 53 y.o. male with a past psychiatric history of alcohol use disorder and anxiety, who presented to [facility] due to AVA likely 2/2 EtOH abuse. Psychiatry was consulted to address the patient's symptoms of substance abuse and candidacy for liver transplant.     Per Primary MD:  52 y/o man with DM2, recent diagnosis of liver failure likely secondary to EtOH abuse in 09/2018, DEL progressing to ESRD admitted to the medicine team for transplant evaluation.   Of note, he was previous in fair health until in September, N/V and diarrhea after sanwitch, resolved, then found have increasing jaundice. Went to hospital, found to have ALI, while he also got hypoxic, CT chest showed large pleural effusion, he was admitted, underwent thoracentesis(removed about 1L), cell study negative for malignancy, and he was treated for PNA and C.diff as well. However, he continue to have weight loss, and start to have AMS, readmitted to hospital, found to have DEL despite ALI. So he was transferred here for eval of liver tx.  Last drink was 9/21/2018.   Denies any fever or chills or chest pain or abdominal pain.      Per Hepatology MD:  History obtain from chart review (records in care everywhere) and from speaking to daughter/wife who are both RN's.  Patient's wife states that he was in fair health until the end of September when they were on vacation (on 9/21/18 they went to [facility]).  She states  "that after eating a sandwhich, patient became very ill and started having diarrhea, nausea, and emesis (wife denies seeing patient consume alcohol during this time).  Eventually  gastroenteritis cleared up, however patient became jaundice and went to go see his GI doctor and was found to have elevated LFTs and hypoxic and was admitted to their local hospital.  Patient had a large right sided effusion which was tapped (1L negative cytology) and he was treated for a PNA.  Patient had a non contrasted abdominal CT at that time which showed multiple hypodensities and US of the abdomen was recommend which no discrete lesions. Furthermore he was also started on steroids for alcoholic hepatitis which were stopped when patient did not improve.      Referral sent to [facility] for liver transplant evaluation and he was approved for outpatient evaluation as well as appointment with Dr. Sharma. On 10/26 [facility] nurse spoke to patient's wife and reported that patient was worsening our team recommend going to the hospital for admission. Family instead got on a commercial flight and brought patient to ED.     Social History:  Patient has been a binge drinker for ~20+ years. When he binges he drinks a 5th of Vodka per day for a couple of days and then stops. He drinks to create an "alternative reality" per wife. He was sober from [id number]. In 2014 daughter had a stroke and he subsequently entered an [facility] for Depression and Alcohol dependence. He also worked with a counselor during this period who eventually discharged him from counseling. Of note has tried AA in the past but has not been effective. Family has not seen him drink in ~ 6 months however they did find a debit card transaction for alcohol purchase on 7/28/18 as well as an empty 5th of Vodka (this was after spending ~ 1 month with his daughter). He follows with a local PCP who did mention his LFT's were elevated last year and he subsequently stopped with resolution of his " "LFT's. No mention of cirrhosis until this September. In March 2018 he quit his job as a  as he was not happy with his job. Family feels that around this time is when he began to decline. Prior to September they reported intermittent episodes of confusion and inappropriateness. No DUI or incarcerations.    Per Addiction Psych MD:  I have seen and interviewed the patient, in conjunction with the medical student, and we have collaborated to create this documentation of the patient encounter. The patient was seen and evaluated by me, chart reviewed. The medical student interviewed the patient, first, and then I performed my assessment - our findings are integrated, below. We discussed the patient's evaluation and devised an assessment/plan. The medical student documented parts of the note, with supervision and editing.    Pt was diagnosed with cirhosis about 1 week ago in [place] by his GI Dr. Janelle Mcqueen in [facility] after the pt presented w/ DEL and bilateral pleural effusions, after which he was found to be in liver failure. Since then the family has decided to come to [facility] to evaluate pt for liver transplant. Pt's last drink was 9/21/2018. He has understanding of his condition, "my liver is shot because I drink too much". Pt has been drinking heavily since 1995, prior to which he was drinking socially. He used to play competitive tennis and the drinking started after their team won their matches. He endorses drinking about 0.6 L of vodka per day "1.75L lasted me about 3 days". Pt has tried unsuccessfully to quit in the past with 12 attempts but never attended rehab and decided to go "cold turkey" which need in relapse. He had one successful quitting with IOP program in [place] 5 years ago and is willing to retry that again. He never went into severe withdrawal such as DT or hallucinosis but felt agitated and a general feeling of malaise when he tried to quit. He has attended AA meetings but " "doesn't like them and claims that they are very "morbid" and "limited in [place]" and he "has heard it all". He verbalizes understanding that he needs to make a lifelong commitment to sobriety if he wants a new liver. Daughter and wife at bedside have done some research into online AA meetings and finding a sponsor that the pt can connect with. Pt states that he his motivated to quit and wants to engage in the program after his medical issues subside. He also has a mental health counselor back in [place] that he is willing to re-engage with.      Substance Abuse History:  Substance of Choice: alcohol  Substances Used: Marijuana in high school  History of IVDU?: No  Use of Alcohol: Yes - last drink 9/21/1987  Average Consumption: 1.75L vodka / 3d  Last Drink: last drink 9/21/1987  Tobacco: No  History of Withdrawals: Yes - agitated and feelings of malaise but no hallucinosis or DT  History of Detox: Yes - cold turkey & once w/ an IOP program successfully after daughter's stroke  Rehab History: No, however did do intensive outpt program  AA/NA: Yes -   Spouse/Partner Consumption: Yes - socially  Patient Aware of Biomedical Complications: Yes     DSM-5 Substance Use Disorder Criteria:  1. Often take in larger amounts or over a longer period of time than was intended: No  2. Persistent desire or unsuccessful efforts to cut down or control use: Yes  3. Great deal of time spent in activities necessary to obtain substance, use, or recover from effects: Yes  4. Craving/strong desire for substance or urge to use: No  5. Use resulting in failure to fulfill major role obligations at home, work or school: Yes  6. Social, occupational, recreational activities decreased because of use: Yes  7. Continued use despite having persistent or recurrent social or interpersonal problems cause or exaserbated by the substance: Yes  8. Recurrent use in situations in which it is physically hazardous: Yes  9. Use despite physical or " psychological problems that are likely to have been caused or exacerbated by the substance: Yes  10. Tolerance, as defined by either of the following: No              A. A need for markedly increased amounts of substance to achieve intoxication or desired effect. -OR-               B. A markedly diminished effect with continued use of the same amount of substance.  11. Withdrawal, as manifested by the following: Yes              A. The characteristic withdrawal syndrome for substance. -AND-              B. Substance is taken to relieve or avoid withdrawal symptoms.  Mild (1-3), Moderate (4-5), Severe (?6)     Psychiatric History:  Diagnoses:  MDD and anxiety  Previous Medication Trials: yes, Lexapro 20 mg   Previous Psychiatric Hospitalizations: yes, 3 day stay at [facility]  Previous Suicide Attempts: no   History of Violence: no  Outpatient Psychiatrist: no     Social History:  Marital Status:   Children: 2   Employment Status: retired  Education: college graduate  Special Ed: no   History: no  Housing Status: own home in [place]  Financial Status: Stable  Leisure/Recreation: tennis, Tv, family  Childhood History: good  History of Physical/Sexual Abuse: no  Access to Gun: yes     Legal History:  Past Charges/Incarcerations: no   Pending Charges: no      Family Psychiatric History:   yes, youngest daughter has anxiety     Psychiatric Review Of Systems:  sleep: yes, increased  appetite: yes, decreased  weight: yes, lost weight  energy/anergy: yes, low energy  interest/pleasure/anhedonia: yes, lack of interest  somatic symptoms: yes, headaches  guilty/hopelessness: yes  concentration: yes, decreased  S.I.B.s/risky behavior: no  SI/SA:  no     anxiety/panic: yes, no known trigger  Agoraphobia:  no  Social phobia:  no  Recurrent nightmares:  no  hyper startle response:  no  Avoidance: no  Recurrent thoughts:  no  Recurrent behaviors:  no     Irritability: no  Racing thoughts: no  Impulsive  behaviors: no  Pressured speech:  no     Paranoia:no  Delusions: no  AVH:no     Medical Review Of Systems:  Negative except as above     Collateral:   yes      Hospital Course: No notes on file         Patient History           Medical as of 10/30/2018     Past Medical History     Diagnosis Date Comments Source    Liver cirrhosis, alcoholic 09/30/2018 -- Provider                  Surgical as of 10/30/2018    Past Surgical History: Patient provided no pertinent surgical history.           Family as of 10/30/2018    None           Tobacco Use as of 10/30/2018     Smoking Status Smoking Start Date Smoking Quit Date Packs/Day Years Used    Never Smoker -- -- -- --    Types Comments Smokeless Tobacco Status Smokeless Tobacco Quit Date Source    -- -- Unknown -- Provider            Alcohol Use as of 10/30/2018     Alcohol Use Drinks/Week Alcohol/Week Comments Source    -- -- -- -- Provider    Frequency Standard Drinks Binge Drinking Source      -- -- -- Provider             Drug Use as of 10/30/2018     Drug Use Types Frequency Comments Source    -- -- -- -- Provider            Sexual Activity as of 10/30/2018     Sexually Active Birth Control Partners Comments Source    -- -- -- -- Provider            Activities of Daily Living as of 10/30/2018    None           Social Documentation as of 10/30/2018    None           Occupational as of 10/30/2018    None           Socioeconomic as of 10/30/2018     Marital Status Spouse Name Number of Children Years Education Education Level Preferred Language Ethnicity Race Source     -- -- -- -- English /White White --    Financial Resource Strain Food Insecurity: Worry Food Insecurity: Inability Transportation Needs: Medical Transportation Needs: Non-medical       -- -- -- -- --             Pertinent History     Question Response Comments    Lives with -- --    Place in Birth Order -- --    Lives in -- --    Number of Siblings -- --    Raised by -- --    Legal  Involvement -- --    Childhood Trauma -- --    Criminal History of -- --    Financial Status -- --    Highest Level of Education -- --    Does patient have access to a firearm? -- --     Service -- --    Primary Leisure Activity -- --    Spirituality -- --        Past Medical History:   Diagnosis Date    Liver cirrhosis, alcoholic 09/30/2018     History reviewed. No pertinent surgical history.  Family History     None        Tobacco Use    Smoking status: Never Smoker   Substance and Sexual Activity    Alcohol use: Not on file    Drug use: Not on file    Sexual activity: Not on file     Review of patient's allergies indicates:   Allergen Reactions    Penicillins Nausea And Vomiting and Rash       No current facility-administered medications on file prior to encounter.      Current Outpatient Medications on File Prior to Encounter   Medication Sig    calcium carbonate/vitamin D3 (VITAMIN D-3 ORAL) Take 1 tablet by mouth once daily.    cyanocobalamin (VITAMIN B-12) 100 MCG tablet Take 100 mcg by mouth once daily.    folic acid (FOLVITE) 1 MG tablet Take 1 mg by mouth once daily.    lactulose (CHRONULAC) 10 gram/15 mL solution Take 20 g by mouth 3 (three) times daily.    multivitamin (THERAGRAN) per tablet Take 1 tablet by mouth once daily.    omeprazole (PRILOSEC) 40 MG capsule Take 40 mg by mouth once daily.    ondansetron (ZOFRAN) 4 MG tablet Take 4 mg by mouth daily as needed for Nausea.    rifAXIMin (XIFAXAN) 550 mg Tab Take 550 mg by mouth 2 (two) times daily.     Psychotherapeutics (From admission, onward)    None        Review of Systems  Strengths and Liabilities: Strength: Patient accepts guidance/feedback, Strength: Patient is motivated for change., Strength: Patient has positive support network., Strength: Patient has reasonable judgment.    Objective:     Vital Signs (Most Recent):  Temp: 98.8 °F (37.1 °C) (10/30/18 0700)  Pulse: 80 (10/30/18 1000)  Resp: (!) 27 (10/30/18 1000)  BP:  "(!) 112/59 (10/30/18 1000)  SpO2: (!) 94 % (10/30/18 1000) Vital Signs (24h Range):  Temp:  [98.2 °F (36.8 °C)-98.8 °F (37.1 °C)] 98.8 °F (37.1 °C)  Pulse:  [78-90] 80  Resp:  [19-31] 27  SpO2:  [93 %-97 %] 94 %  BP: ()/(52-69) 112/59     Height: 5' 10" (177.8 cm)  Weight: 82.6 kg (182 lb 1.6 oz)  Body mass index is 26.13 kg/m².      Intake/Output Summary (Last 24 hours) at 10/30/2018 1152  Last data filed at 10/30/2018 1000  Gross per 24 hour   Intake 740 ml   Output 2692 ml   Net -1952 ml       Physical Exam   Psychiatric:   CAM-ICU:  Acute change and/or fluctuating course of mental status: Yes   Inattention (SAVEAHAART): No  "Squeeze my hand, only when you hear, the letter 'A'."  Altered Level of Consciousness: No  Disorganized Thinking (Errors >1/6): No  "Will a stone float on water?"  "Are there fish in the sea?"  "Does one pound weigh more than two?"  "Can you use a hammer to pound a nail?"  Command(s):  "Hold up 2 fingers."  "Now do the same thing with the other hand."    Score: 1+2 AND, either 3 or 4 present = Positive CAM-ICU    Mental Status Exam  Appearance: older than stated age, bearded, lying in bed, thin & gaunt looking  Level of Consciousness: alert  Behavior/Cooperation: normal, friendly and cooperative  Psychomotor: within normal limits   Speech: normal tone, normal rate, normal pitch, normal volume  Language: english, fluid  Orientation: grossly intact, person, place, situation, month of year, stated date of 2019  Attention Span/Concentration: CAM-ICU negative  Memory: Registers and recalls 3/3 objects at 1 and 5 minutes  Mood: "neutral"  Affect: euthymic  Thought Process: linear, normal and logical  Associations: normal and logical  Thought Content: normal, no suicidality, no homicidality, delusions, or paranoia  Fund of Knowledge: Aware of current events  Abstraction: proverbs were abstract, similarities were concrete  Insight: good  Judgment: good        Significant Labs: All pertinent " labs within the past 24 hours have been reviewed.    Significant Imaging: I have reviewed all pertinent imaging results/findings within the past 24 hours.    Assessment/Plan:     Alcohol use disorder, severe, in early remission    ASSESSMENT     Femi Enciso is a 53 y.o. male with a past psychiatric history of alcohol use disorder and anxiety, who presented to [facility] due to AVA likely 2/2 EtOH abuse. Psychiatry was consulted to address the patient's symptoms of substance abuse and candidacy for liver transplant. Pt reports discovering approx 5 yrs ago from his PCP that he had abnormal laboratory findings (transaminitis) indicative of EtOH abuse, and advised that he needed to stop drinking. Thereafter, pt reports a period of 2.5yrs of sobriety in the setting of outpatient counseling and AA meetings, only to relapse, thereafter, following a CVA experienced by his daughter. Pt claims said experience led him into a bout of depression, which required a brief inpatient psychiatric hospitalization for medical stabilization, however he continued to drink, until 9/21/2018, when he was hospitalized and diagnosed with C diff colitis, ALI, and DEL. Pt was then sent to [facility] for liver transplant evaluation. Pt exhibits stable/improving inisght/judgment into his addiction evidenced by his decision to quit drinking, upon most recent hospitalization and discovery of alcohol induced liver dysfunction. Pt also possesses strong familial support evidenced by multiple family members at bedside, willing to provide pt with any/all support required to in aid a full recovery. Pt willing to remain abstinent from EtOH, attend and complete an IOP program, receive serial PETH and Utox screening, as well as attend AA meetings, thereafter.     IMPRESSION  Alcohol use disorder, severe, in early remission  Anxiety disorder, unspecified  Depressive disorder, unspecified    RECOMMENDATION(S)     1. Scheduled Medication(s):  Per primary team    2. PRN  Medication(s):  Per primary team    3. Other:  · Moderate risk at this time  · Recommend serial PETH testing  · Recommend serial urine toxicology screening  · Recommend referral to and completion of day program for education on alcohol abuse, for coping/behavioral skills to reinforce sobriety, as well as to attend AA meetings.  · PETH pending            Total Time:  60 minutes      In cases of emergency, daily coverage provided by Acute/ER Psych MD, NP, or SW, with contact numbers located in [facility] On Call Schedule.    Case discussed with addiction psychiatry staff: MD Joss Thompson MD, MICHAEL  [facility], PGY-2  [facility]  Pager Number [phone]

## 2018-10-30 NOTE — PLAN OF CARE
Problem: Patient Care Overview  Goal: Plan of Care Review  Outcome: Ongoing (interventions implemented as appropriate)  Neuro- Pt now fully AAOx4 (steady improvement during shift), afebrile, able to follow commands  Resp- 2L NC, coarse breath sounds- diminished in bases   Card- NSR 70-80s, BP MAP in 70s, Chest tube on right side to -20 suction (output 1450mL of yellow fluid) clamped now per order  GI/- Pt still has a poor appetite, oliuric (output 50mLs of dark rina urine), frequent BMs of yellow-green semi-formed stool  Endo- BG 170s- no insulin coverage required   Skin/Musk- jauniced skin, turned Q2hr, waffle mattress and protective foam.    POC: R chest tube placed. Chest tube is clamped per order and to remain clamped until tomorrow morning when reevaluated by team. Tramadol given for pain. Pt will need dialysis tomorrow. Care explained to Femi Enciso (patient) and wife, who verbalized understanding. WCTM

## 2018-10-30 NOTE — SUBJECTIVE & OBJECTIVE
Interval History/Significant Events: pt mental status improved, slightly sob, no abdominal pain or chest pain    Review of Systems  Objective:     Vital Signs (Most Recent):  Temp: 98.8 °F (37.1 °C) (10/30/18 0700)  Pulse: 80 (10/30/18 1000)  Resp: (!) 27 (10/30/18 1000)  BP: (!) 112/59 (10/30/18 1000)  SpO2: (!) 94 % (10/30/18 1000) Vital Signs (24h Range):  Temp:  [98.2 °F (36.8 °C)-98.8 °F (37.1 °C)] 98.8 °F (37.1 °C)  Pulse:  [78-90] 80  Resp:  [19-31] 27  SpO2:  [93 %-97 %] 94 %  BP: ()/(52-69) 112/59   Weight: 82.6 kg (182 lb 1.6 oz)  Body mass index is 26.13 kg/m².      Intake/Output Summary (Last 24 hours) at 10/30/2018 1133  Last data filed at 10/30/2018 1000  Gross per 24 hour   Intake 740 ml   Output 2692 ml   Net -1952 ml       Physical Exam   Constitutional:   Thin, markedly jaundiced male in no distress   HENT:   Mouth/Throat: No oropharyngeal exudate.   Eyes: Scleral icterus is present.   Cardiovascular: Normal rate, regular rhythm and normal heart sounds.   Pulmonary/Chest: Effort normal.   Reduced breath sounds and dullness to percussion to right lung fields.    Abdominal: He exhibits distension. There is no tenderness. There is no rebound and no guarding.   Musculoskeletal: He exhibits no edema.   Skin: Skin is warm and dry.   Markedly jaundiced   Psychiatric: He has a normal mood and affect. His behavior is normal.   Nursing note and vitals reviewed.      Vents:     Lines/Drains/Airways     Central Venous Catheter Line                 Percutaneous Central Line Insertion/Assessment - triple lumen  10/28/18 1740 right internal jugular 1 day          Peripheral Intravenous Line                 Peripheral IV - Single Lumen 10/27/18 1456 Right Antecubital 2 days         Peripheral IV - Single Lumen 10/27/18 1600 Left Hand 2 days              Significant Labs:    CBC/Anemia Profile:  Recent Labs   Lab 10/29/18  0316 10/30/18  0419   WBC 11.76 10.00   HGB 11.1* 11.1*   HCT 32.4* 31.4*   PLT 83* 63*    MCV 96 94   RDW 19.1* 19.3*        Chemistries:  Recent Labs   Lab 10/29/18  0316 10/29/18  1358 10/29/18  2212 10/30/18  0419    136 138 138  138   K 3.8 4.1 4.0 3.8  3.8    108 101 104  104   CO2 16* 14* 30* 24  24   BUN 98* 104* 10 50*  50*   CREATININE 5.4* 5.9* 0.8 4.0*  4.0*   CALCIUM 7.4* 7.4* 7.6* 7.6*  7.6*   ALBUMIN 2.7* 2.5* 2.5* 2.4*  2.4*   PROT 5.3*  --   --  5.1*   BILITOT 39.3*  --   --  37.5*   ALKPHOS 159*  --   --  180*   ALT 64*  --   --  60*   AST 94*  --   --  114*   MG 2.1 2.4 1.6 2.0  2.0   PHOS 6.3* 6.5* 1.4* 4.0  4.0       Recent Lab Results       10/30/18  0419   10/29/18  2252   10/29/18  2212   10/29/18  1358        Immature Granulocytes 1.1           Immature Grans (Abs) 0.11  Comment:  Mild elevation in immature granulocytes is non specific and   can be seen in a variety of conditions including stress response,   acute inflammation, trauma and pregnancy. Correlation with other   laboratory and clinical findings is essential.             Albumin 2.4   2.5 2.5      2.4           Alkaline Phosphatase 180           ALT 60  Comment:  *Result may be interfered by hyperbilirubinemia           Anion Gap 10   7 14      10                      Baso # 0.05           Basophil% 0.5           Total Bilirubin 37.5  Comment:  For infants and newborns, interpretation of results should be based  on gestational age, weight and in agreement with clinical  observations.  Premature Infant recommended reference ranges:  Up to 24 hours.............<8.0 mg/dL  Up to 48 hours............<12.0 mg/dL  3-5 days..................<15.0 mg/dL  6-29 days.................<15.0 mg/dL             BUN, Bld 50   10 104      50           Calcium 7.6   7.6 7.4      7.6           Chloride 104   101 108      104           CO2 24   30 14      24           Creatinine 4.0  Comment:  *Result may be interfered by hyperbilirubinemia   0.8  Comment:  *Result may be interfered by hyperbilirubinemia  5.9  Comment:  *Result may be interfered by hyperbilirubinemia      4.0  Comment:  *Result may be interfered by hyperbilirubinemia           Differential Method Automated           eGFR if  18.5   >60.0 11.6      18.5           eGFR if non  16.0  Comment:  Calculation used to obtain the estimated glomerular filtration  rate (eGFR) is the CKD-EPI equation.      >60.0  Comment:  Calculation used to obtain the estimated glomerular filtration  rate (eGFR) is the CKD-EPI equation.    10.0  Comment:  Calculation used to obtain the estimated glomerular filtration  rate (eGFR) is the CKD-EPI equation.         16.0  Comment:  Calculation used to obtain the estimated glomerular filtration  rate (eGFR) is the CKD-EPI equation.              Eos # 0.4           Eosinophil% 4.2           Glucose 152   113 183      152           Gran # (ANC) 7.2           Gran% 71.6           Hematocrit 31.4           Hemoglobin 11.1           Coumadin Monitoring INR 2.3  Comment:  Coumadin Therapy:  2.0 - 3.0 for INR for all indicators except mechanical heart valves  and antiphospholipid syndromes which should use 2.5 - 3.5.             Lymph # 0.9           Lymph% 9.0           Magnesium 2.0   1.6 2.4      2.0           MCH 33.1           MCHC 35.4           MCV 94           Mono # 1.4           Mono% 13.6           MPV 12.8           nRBC 0           Phosphorus 4.0   1.4 6.5      4.0           Platelets 63           POCT Glucose   121         Potassium 3.8   4.0 4.1      3.8           Total Protein 5.1           Protime 22.4           RBC 3.35           RDW 19.3           Sodium 138   138 136      138           WBC 10.00               All pertinent labs within the past 24 hours have been reviewed.    Significant Imaging:  I have reviewed all pertinent imaging results/findings within the past 24 hours.

## 2018-10-30 NOTE — PHYSICIAN QUERY
PT Name: Femi Enciso  MR #: [id number]    Physician Query Form - Cause and Effect Relationship Clarification      CDS Alison Dumont, RN, BSN        Contact Information:  [phone]    [email or web address]           This form is a permanent document in the medical record.     Query Date: October 30, 2018    By submitting this query, we are merely seeking further clarification of documentation. Please utilize your independent clinical judgment when addressing the question(s) below.    The Medical record contains the following:  Supporting Clinical Findings   Location in record      Sepsis                                                                                                                         - Recent hospitalization w Rx for PNA and C.diff  - Leukocytosis at 13k,     Broad coverage w vanco and cefepime    F/u cultures, deescalate as needed                                                                10/28 H&P Critical Care Medicine , Active Problem list       ENTEROBACTER CLOACAE   > 100,000 cfu/ml                                                                                                                                                                                         Urine culture 10/27          Provider, please clarify if there is any correlation between  sepsis    and  Enterobacter cloacae            Are the conditions:      yes Due to or associated with each other    Unrelated to each other    Other (Please Specify): _________________________    Clinically Undetermined

## 2018-10-30 NOTE — ASSESSMENT & PLAN NOTE
MELD-Na score: 43 at 10/30/2018  4:19 AM  MELD score: 43 at 10/30/2018  4:19 AM  Calculated from:  Serum Creatinine: 4 mg/dL at 10/30/2018  4:19 AM  Serum Sodium: 138 mmol/L (Rounded to 137 mmol/L) at 10/30/2018  4:19 AM  Total Bilirubin: 37.5 mg/dL at 10/30/2018  4:19 AM  INR(ratio): 2.3 at 10/30/2018  4:19 AM  Age: 53 years    - Continue albumin, midodrine, lactulose and rifaximin   Had>6BM overnight, decrease lactulose dosing to target BM 3-5 per day  - Hepatology consulted, appreciate evaluation.

## 2018-10-30 NOTE — SUBJECTIVE & OBJECTIVE
Patient History           Medical as of 10/30/2018     Past Medical History     Diagnosis Date Comments Source    Liver cirrhosis, alcoholic 09/30/2018 -- Provider                  Surgical as of 10/30/2018    Past Surgical History: Patient provided no pertinent surgical history.           Family as of 10/30/2018    None           Tobacco Use as of 10/30/2018     Smoking Status Smoking Start Date Smoking Quit Date Packs/Day Years Used    Never Smoker -- -- -- --    Types Comments Smokeless Tobacco Status Smokeless Tobacco Quit Date Source    -- -- Unknown -- Provider            Alcohol Use as of 10/30/2018     Alcohol Use Drinks/Week Alcohol/Week Comments Source    -- -- -- -- Provider    Frequency Standard Drinks Binge Drinking Source      -- -- -- Provider             Drug Use as of 10/30/2018     Drug Use Types Frequency Comments Source    -- -- -- -- Provider            Sexual Activity as of 10/30/2018     Sexually Active Birth Control Partners Comments Source    -- -- -- -- Provider            Activities of Daily Living as of 10/30/2018    None           Social Documentation as of 10/30/2018    None           Occupational as of 10/30/2018    None           Socioeconomic as of 10/30/2018     Marital Status Spouse Name Number of Children Years Education Education Level Preferred Language Ethnicity Race Source     -- -- -- -- English /White White --    Financial Resource Strain Food Insecurity: Worry Food Insecurity: Inability Transportation Needs: Medical Transportation Needs: Non-medical       -- -- -- -- --             Pertinent History     Question Response Comments    Lives with -- --    Place in Birth Order -- --    Lives in -- --    Number of Siblings -- --    Raised by -- --    Legal Involvement -- --    Childhood Trauma -- --    Criminal History of -- --    Financial Status -- --    Highest Level of Education -- --    Does patient have access to a firearm? -- --     Service --  --    Primary Leisure Activity -- --    Spirituality -- --        Past Medical History:   Diagnosis Date    Liver cirrhosis, alcoholic 09/30/2018     History reviewed. No pertinent surgical history.  Family History     None        Tobacco Use    Smoking status: Never Smoker   Substance and Sexual Activity    Alcohol use: Not on file    Drug use: Not on file    Sexual activity: Not on file     Review of patient's allergies indicates:   Allergen Reactions    Penicillins Nausea And Vomiting and Rash       No current facility-administered medications on file prior to encounter.      Current Outpatient Medications on File Prior to Encounter   Medication Sig    calcium carbonate/vitamin D3 (VITAMIN D-3 ORAL) Take 1 tablet by mouth once daily.    cyanocobalamin (VITAMIN B-12) 100 MCG tablet Take 100 mcg by mouth once daily.    folic acid (FOLVITE) 1 MG tablet Take 1 mg by mouth once daily.    lactulose (CHRONULAC) 10 gram/15 mL solution Take 20 g by mouth 3 (three) times daily.    multivitamin (THERAGRAN) per tablet Take 1 tablet by mouth once daily.    omeprazole (PRILOSEC) 40 MG capsule Take 40 mg by mouth once daily.    ondansetron (ZOFRAN) 4 MG tablet Take 4 mg by mouth daily as needed for Nausea.    rifAXIMin (XIFAXAN) 550 mg Tab Take 550 mg by mouth 2 (two) times daily.     Psychotherapeutics (From admission, onward)    None        Review of Systems  Strengths and Liabilities: Strength: Patient accepts guidance/feedback, Strength: Patient is motivated for change., Strength: Patient has positive support network., Strength: Patient has reasonable judgment.    Objective:     Vital Signs (Most Recent):  Temp: 98.8 °F (37.1 °C) (10/30/18 0700)  Pulse: 80 (10/30/18 1000)  Resp: (!) 27 (10/30/18 1000)  BP: (!) 112/59 (10/30/18 1000)  SpO2: (!) 94 % (10/30/18 1000) Vital Signs (24h Range):  Temp:  [98.2 °F (36.8 °C)-98.8 °F (37.1 °C)] 98.8 °F (37.1 °C)  Pulse:  [78-90] 80  Resp:  [19-31] 27  SpO2:  [93 %-97 %]  "94 %  BP: ()/(52-69) 112/59     Height: 5' 10" (177.8 cm)  Weight: 82.6 kg (182 lb 1.6 oz)  Body mass index is 26.13 kg/m².      Intake/Output Summary (Last 24 hours) at 10/30/2018 1152  Last data filed at 10/30/2018 1000  Gross per 24 hour   Intake 740 ml   Output 2692 ml   Net -1952 ml       Physical Exam   Psychiatric:   CAM-ICU:  Acute change and/or fluctuating course of mental status: Yes   Inattention (SAVEAHAART): No  "Squeeze my hand, only when you hear, the letter 'A'."  Altered Level of Consciousness: No  Disorganized Thinking (Errors >1/6): No  "Will a stone float on water?"  "Are there fish in the sea?"  "Does one pound weigh more than two?"  "Can you use a hammer to pound a nail?"  Command(s):  "Hold up 2 fingers."  "Now do the same thing with the other hand."    Score: 1+2 AND, either 3 or 4 present = Positive CAM-ICU    Mental Status Exam  Appearance: older than stated age, bearded, lying in bed, thin & gaunt looking  Level of Consciousness: alert  Behavior/Cooperation: normal, friendly and cooperative  Psychomotor: within normal limits   Speech: normal tone, normal rate, normal pitch, normal volume  Language: english, fluid  Orientation: grossly intact, person, place, situation, month of year, stated date of 2019  Attention Span/Concentration: CAM-ICU negative  Memory: Registers and recalls 3/3 objects at 1 and 5 minutes  Mood: "neutral"  Affect: euthymic  Thought Process: linear, normal and logical  Associations: normal and logical  Thought Content: normal, no suicidality, no homicidality, delusions, or paranoia  Fund of Knowledge: Aware of current events  Abstraction: proverbs were abstract, similarities were concrete  Insight: good  Judgment: good        Significant Labs: All pertinent labs within the past 24 hours have been reviewed.    Significant Imaging: I have reviewed all pertinent imaging results/findings within the past 24 hours.  "

## 2018-10-30 NOTE — HPI
"Per Primary MD:  "Femi Enciso is a 53yr old male with PMH of acute ETOH hepatitis and DEL/ATN evolved to ESRD requiring HD (not candidate for KTX). He is now s/p liver transplant (SM induction, DBD, CMV D+/R-). Transplant surgery noted severe collateralization, adrenal gland firmly adhered to liver. Bare area of adrenal gland required several stitches and packing to obtain fair hemostasis (EBL 15L). OR take back 11/16 for bleeding from R adrenal bed in AM (significant clot in retroperitoneum, posterior to R hepatic lobe, tract posterior to the R kidney containing significant clot, small bleeding area of retroperitoneal fat) then returned to surgery same day for hemorrhaging closed with wound vac with plans to return to OR 24-48 hours for closure (retroperitoneal /retrorenal/retrocolic hematoma on the right ). Raw retroperitoneal bleeding. Suture ligatures placed, argon beam cautery, evarest placed in retroperitoneum behind cava and right kidney. Significant oozing from upper right adrenal gland, not amenable to ligation, that portion of the adrenal gland was resected with cautery). OR take back 11/18 for washout and incisional closure, no significant bleeding or hematoma found. OR cultures 11/18  from body fluid grew enterococcus faecium VRE. ID was consulted, 11/21 started on daptomycin for VRE treatment and cefepime/flagyl to cover for IA ty in bile. Patient with significant leukocytosis with peak on 11/22 at 48K prompting drainage of R subphrenic fluid collection, perc drain placed, culture grew VRE. Stroke code called 11/21 for lethargy and unresponsive, CT head without evidence of acute ischemic changes and CTA chest negative, vascular neurology was consulted recommended MRI brain, but patient's AMS improved shortly after event. Nephrology consulted for ESRD requiring HD. Patient overall improved on antibiotic regimen, leukocytosis and AMS improved. He was transferred to [facility] on POD #15."    Per Psychiatry MD: "   Mr. Enciso is a 54 yo male with a past psychiatric history of alcohol abuse, anxiety, currently presenting with acute liver failure.  Psychiatry was consulted to address the patient's symptoms of delirium.     Wife reports that since transplant, mental status had gradually been improving up until 12/1.  States that on 11/30, she and  were able to play cards.  However, beginning 12/1, he has demonstrated increased somnolence throughout both the day and night.  Has had decreased appetite and lower activity levels.      During this time period, he has elevated tacrolimus levels.  Was switched from quetiapine 50 mg QHS ->25 mg QHS (11/30), which was then discontinued and started on trazodone 50 mg QHS.  Additionally, frequent oxycodone use noted between 11/30-12/1.  Repeat CTH on 12/4 without any acute changes.  Currently on antibiotics per ID for infection.      Collateral:   Wife at bedside    SUBJECTIVE   Currently, the patient and wife endorse the following:    Medical Review Of Systems:  Pertinent items are noted in HPI.    Psychiatric Review Of Systems - Is patient experiencing or having changes in:  sleep: yes - increase  appetite: yes - decrease  weight: no - decrease  energy/anergy: yes, decrease  interest/pleasure/anhedonia: yes, decrease  concentration: no, decrease  S.I.B.s/risky behavior: no  SI/SA:  no    anxiety/panic: no  Agoraphobia:  no  Social phobia:  no  Recurrent nightmares:  no  hyper startle response:  no  Avoidance: no  Recurrent thoughts:  no  Recurrent behaviors:  no    Irritability: no  Racing thoughts: no  Impulsive behaviors: no  Pressured speech:  no    Paranoia:no  Delusions: no  AVH: wife reports concern for VH    Past Medical/Surgical History:  [unfilled]  [unfilled]  [unfilled]    Past Psychiatric History:  Previous Medication Trials: Yes - lexapro 20 mg   Previous Psychiatric Hospitalizations: Yes - 3 day stay at [facility] for alcohol abuse in 2013  Previous Suicide  Attempts: No  History of Violence: No  Outpatient Psychiatrist: No  Outpatient Psychotherapist: No    Social History:  Marital Status:   Children: 2   Employment Status/Info: retired from [employer] company  Education: college graduate  Special Ed: no  Housing/Lives with: wife  History of phys/sexual abuse: No  Access to gun: Yes    Substance Abuse History:  Recreational Drugs: marijuana as a kid  Use of Alcohol: heavy  Rehab History:yes   Tobacco Use:no  Use of OTC: no  Last drink 9/21/18 per wife's report  Average consumption: 1.75 L Vodka every 3 days  Is the patient aware of the biomedical complications associated with substance abuse and mental illness? yes    Legal History:  Past Charges/Incarcerations:no  Pending charges:no     Family Psychiatric History:   Youngest daughter has anxiety    Psychosocial Stressors: drug and alcohol.   Functioning Relationships: good relationship with spouse or significant other

## 2018-10-30 NOTE — ASSESSMENT & PLAN NOTE
- Likely hepatothorax.    Still sob after CRRT, will repeat CXR   Will likely drain today as pt was complaining about SOB

## 2018-10-30 NOTE — ASSESSMENT & PLAN NOTE
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   Plan:  -No evidence of renal recovery remains anuric.  - please continue Albumin admin 25% q 6 hrs x 4 more doses and reassess in setting of low albumin specially if thoracentesis is planned  - Catheter seems to be functioning weil. SKEL with clotting issues. Would like to attempt HD trial in am if hemodynamically  He can tolerate it. If no recovery will need a Perm cath next week  - Renal US with adequate size kidneys no hydro  - UCx GNR > 100 K  - UPCR low  - Strict I/O and chart  - Avoid nephrotoxic medications  - Maintain MAP >65 please continue midodrine  - Hb > 7 gm/dL  - Medication doses adjusted to GFR

## 2018-10-30 NOTE — PT/OT/SLP EVAL
Physical Therapy Evaluation    Patient Name:  Femi Enciso   MRN:  [id number]    Recommendations:     Discharge Recommendations:    Home with home health   Discharge Equipment Recommendations: none   Barriers to discharge: None    Assessment:     Femi Enciso is a 53 y.o. male admitted with a medical diagnosis of Acute liver failure without hepatic coma.  He presents with the following impairments/functional limitations:  weakness, impaired endurance, gait instability, impaired balance, impaired cardiopulmonary response to activity, impaired functional mobilty, impaired self care skills. Pt tolerated activity with non reports of SOB, no LOB, VSS throughout. Pt required CGA for all OOB mobility. Pt is progressing well but not at PLOF. Pt would continue to benefit from acute skilled therapy intervention to address deficits and progress toward prior level of function.       Rehab Prognosis:  good; patient would benefit from acute skilled PT services to address these deficits and reach maximum level of function.      Recent Surgery: * No surgery found *      Plan:     During this hospitalization, patient to be seen 3 x/week to address the above listed problems via gait training, therapeutic activities, therapeutic exercises, neuromuscular re-education  · Plan of Care Expires:  11/30/18   Plan of Care Reviewed with: patient, spouse    Subjective     Communicated with RN prior to session.  Patient found supine upon PT entry to room, agreeable to evaluation.      Chief Complaint: pt eager to get out of bed   Patient comments/goals: to get better and return home   Pain/Comfort:  · Pain Rating 1: 0/10  · Pain Rating Post-Intervention 1: 0/10    Patients cultural, spiritual, Shinto conflicts given the current situation: none    Living Environment:  Pt lives with wife in [facility] with no BEBE. House has tub/shower and walk in shower.   Prior to admission, patients level of function was independent with all mobility and ADLs.   Patient has the following equipment: none.  DME owned (not currently used): none.  Upon discharge, patient will have assistance from wife-works during the day. Pt states he could arrange for assistance during the day if needed.    Objective:     Patient found with: telemetry, peripheral IV, blood pressure cuff, central line, Oxygen (2L)      General Precautions: Standard, fall   Orthopedic Precautions:N/A   Braces: N/A     Exams:  · Cognitive Exam:  Patient is AAOx4, followed all commands, communicates clearly and fluently  · Gross Motor Coordination:  WFL  · Sensation:    · -       Intact  · RUE ROM: WFL  · RUE Strength: WFL  · LUE ROM: WFL  · LUE Strength: WFL  · RLE ROM: WFL  · RLE Strength: WFL  · LLE ROM: WFL  · LLE Strength: WFL     Functional Mobility:  · Bed Mobility:     · Supine to Sit: minimum assistance with cuing for hand placement and motor sequencing, assistance for ascent of trunk   · Transfers:     · Sit to Stand:  contact guard assistance with no AD  · Gait: Pt ambulated 6 feet to chair, performed turn pivot to sit in chair all with no AD and CGA. Pt with decreased daniela, decreased step size, decreased foot clearance, increased lateral sway.     Blood pressure: at rest: 117/61 mmHg (MAP 82); Following transfer: 107/56 mmHg (MAP 76)     AM-PAC 6 CLICK MOBILITY  Total Score:17       Therapeutic Activities and Exercises:   Pt educated on role of PT/POC. Pt verbalized understanding.     Pt educated on LE exercises to perform 20x reps, 3x/day: LAQ, seated marching, ankle PF/DF, glut sets. Pt performed 3x of each bilaterally. Exercises written on white board, pt and family verbalized understanding.     Patient left up in chair with all lines intact, call button in reach, RN  notified and family  present.    GOALS:   Multidisciplinary Problems     Physical Therapy Goals        Problem: Physical Therapy Goal    Goal Priority Disciplines Outcome Goal Variances Interventions   Physical Therapy Goal     PT,  PT/OT Ongoing (interventions implemented as appropriate)     Description:  Goals to be met by: 2018     Patient will increase functional independence with mobility by performin. Supine to sit with Modified [place]  2. Sit to stand transfer with [place]  3. Bed to chair transfer with independence using no AD  4. Gait  x 50 feet with Supervision using LRAD.   5. Lower extremity exercise program x20 reps per handout, with independence                      History:     Past Medical History:   Diagnosis Date    Liver cirrhosis, alcoholic 2018       History reviewed. No pertinent surgical history.    Clinical Decision Making:     History  Co-morbidities and personal factors that may impact the plan of care Examination  Body Structures and Functions, activity limitations and participation restrictions that may impact the plan of care Clinical Presentation   Decision Making/ Complexity Score   Co-morbidities:   [] Time since onset of injury / illness / exacerbation  [] Status of current condition  []Patient's cognitive status and safety concerns    [] Multiple Medical Problems (see med hx)  Personal Factors:   [] Patient's age  [] Prior Level of function   [] Patient's home situation (environment and family support)  [] Patient's level of motivation  [] Expected progression of patient      HISTORY:(criteria)    [] 04978 - no personal factors/history    [] 80161 - has 1-2 personal factor/comorbidity     [] 94520 - has >3 personal factor/comorbidity     Body Regions:  [] Objective examination findings  [] Head     []  Neck  [] Trunk   [] Upper Extremity  [] Lower Extremity    Body Systems:  [] For communication ability, affect, cognition, language, and learning style: the assessment of the ability to make needs known, consciousness, orientation (person, place, and time), expected emotional /behavioral responses, and learning preferences (eg, learning barriers, education  needs)  [] For the  neuromuscular system: a general assessment of gross coordinated movement (eg, balance, gait, locomotion, transfers, and transitions) and motor function  (motor control and motor learning)  [] For the musculoskeletal system: the assessment of gross symmetry, gross range of motion, gross strength, height, and weight  [] For the integumentary system: the assessment of pliability(texture), presence of scar formation, skin color, and skin integrity  [] For cardiovascular/pulmonary system: the assessment of heart rate, respiratory rate, blood pressure, and edema     Activity limitations:    [] Patient's cognitive status and saf ety concerns          [] Status of current condition      [] Weight bearing restriction  [] Cardiopulmunary Restriction    Participation Restrictions:   [] Goals and goal agreement with the patient     [] Rehab potential (prognosis) and probable outcome      Examination of Body System: (criteria)    [] 15011 - addressing 1-2 elements    [] 88352 - addressing a total of 3 or more elements     [] 49132 -  Addressing a total of 4 or more elements         Clinical Presentation: (criteria)  Choose one     On examination of body system using standardized tests and measures patient presents with 1-2 elements from any of the following: body structures and functions, activity limitations, and/or participation restrictions.  Leading to a clinical presentation that is considered stable and/or uncomplicated                              Clinical Decision Making  (Eval Complexity):  Low- 03761     Time Tracking:     PT Received On: 10/30/18  PT Start Time: 1056     PT Stop Time: 1112  PT Total Time (min): 16 min     Billable Minutes: Evaluation 8 mins  and Therapeutic Exercise 8 mins       Tessa Moore, PT  10/30/2018

## 2018-10-30 NOTE — SUBJECTIVE & OBJECTIVE
Interval History: SLED done for total of 8 hrs but it was completed in 2 blocks of 4 hrs due to clotting.. He seems to have coagulation issues. Family at bedside with improving MS slowly. He is hemodynamically stable Off pressors. Oxygenation stable despite large parapneumonic effusion. He had good clearance overnight. Fluid balance Net neg 2017 ml overnight UOP was 75 ml overnight. BB remains elevted at 37.5 mg/dL.    Review of patient's allergies indicates:   Allergen Reactions    Penicillins Nausea And Vomiting and Rash     Current Facility-Administered Medications   Medication Frequency    0.9%  NaCl infusion (CRRT USE ONLY) Continuous    0.9%  NaCl infusion (for blood administration) Q24H PRN    acetaminophen tablet 650 mg Q4H PRN    albuterol-ipratropium 2.5 mg-0.5 mg/3 mL nebulizer solution 3 mL Q6H WAKE    ceFEPIme injection 1 g Q24H    dextrose 50% injection 12.5 g PRN    dextrose 50% injection 25 g PRN    folic acid tablet 1 mg Daily    glucagon (human recombinant) injection 1 mg PRN    glucose chewable tablet 16 g PRN    glucose chewable tablet 24 g PRN    heparin (porcine) injection 5,000 Units Q12H    insulin aspart U-100 pen 0-5 Units QID (AC + HS) PRN    lactulose 20 gram/30 mL solution Soln 30 g TID    lidocaine 5 % patch 1 patch Q24H    midodrine tablet 10 mg TID    multivitamin tablet 1 tablet Daily    ondansetron disintegrating tablet 8 mg Q6H PRN    pantoprazole EC tablet 40 mg Daily    promethazine (PHENERGAN) 12.5 mg in dextrose 5 % 50 mL IVPB Q6H PRN    rifAXIMin tablet 550 mg BID    sevelamer carbonate tablet 800 mg TID WM    sodium bicarbonate tablet 1,300 mg TID    sodium chloride 0.9% flush 5 mL PRN    thiamine tablet 100 mg Daily    traMADol tablet 50 mg Q8H PRN       Objective:     Vital Signs (Most Recent):  Temp: 97.8 °F (36.6 °C) (10/30/18 1100)  Pulse: 78 (10/30/18 1300)  Resp: (!) 21 (10/30/18 1300)  BP: (!) 108/58 (10/30/18 1300)  SpO2: (!) 94 % (10/30/18  1300)  O2 Device (Oxygen Therapy): nasal cannula (10/30/18 1100) Vital Signs (24h Range):  Temp:  [97.8 °F (36.6 °C)-98.8 °F (37.1 °C)] 97.8 °F (36.6 °C)  Pulse:  [78-90] 78  Resp:  [15-31] 21  SpO2:  [93 %-97 %] 94 %  BP: ()/(52-69) 108/58     Weight: 82.6 kg (182 lb 1.6 oz) (10/30/18 0400)  Body mass index is 26.13 kg/m².  Body surface area is 2.02 meters squared.    I/O last 3 completed shifts:  In: 1425 [P.O.:960; I.V.:65; Blood:250; IV Piggyback:150]  Out: 4185 [Urine:275; Other:3060; Stool:850]    Physical Exam   Constitutional: He appears well-developed. He appears cachectic. He is cooperative. No distress. Nasal cannula in place.   Temporal musc wasting   HENT:   Head: Normocephalic and atraumatic.   Eyes: Conjunctivae are normal. Pupils are equal, round, and reactive to light.   Neck: Trachea normal and normal range of motion. Neck supple. No JVD present.   Cardiovascular: Normal rate, regular rhythm, S1 normal, S2 normal and normal pulses. Exam reveals no gallop and no friction rub.   No murmur heard.  Pulmonary/Chest: Effort normal. He has decreased breath sounds in the right upper field, the right middle field, the right lower field and the left lower field. He has rhonchi in the left lower field.   Abdominal: Soft. Bowel sounds are normal. He exhibits distension and ascites. There is no tenderness.   Musculoskeletal: Normal range of motion. He exhibits no edema.   Neurological: He is alert.   Skin: Skin is warm and dry. Capillary refill takes less than 2 seconds.   Psychiatric: He has a normal mood and affect. His behavior is normal.   Vitals reviewed.      Significant Labs:  ABGs: No results for input(s): PH, PCO2, HCO3, POCSATURATED, BE in the last 168 hours.  BMP:   Recent Labs   Lab 10/30/18  1136   *      CO2 23   BUN 55*   CREATININE 4.6*   CALCIUM 7.5*   MG 2.3     Cardiac Markers: No results for input(s): CKMB, TROPONINT, MYOGLOBIN in the last 168 hours.  CBC:   Recent Labs    Lab 10/30/18  0419   WBC 10.00   RBC 3.35*   HGB 11.1*   HCT 31.4*   PLT 63*   MCV 94   MCH 33.1*   MCHC 35.4     CMP:   Recent Labs   Lab 10/30/18  0419 10/30/18  1136   *  152* 174*   CALCIUM 7.6*  7.6* 7.5*   ALBUMIN 2.4*  2.4* 2.5*   PROT 5.1*  --      138 138   K 3.8  3.8 3.7   CO2 24  24 23     104 104   BUN 50*  50* 55*   CREATININE 4.0*  4.0* 4.6*   ALKPHOS 180*  --    ALT 60*  --    *  --    BILITOT 37.5*  --      Coagulation:   Recent Labs   Lab 10/30/18  0419   INR 2.3*     Recent Labs   Lab 10/27/18  1640 10/27/18  1642   COLORU Jackelyn  --    SPECGRAV 1.010  --    PHUR 5.0  --    PROTEINUA Negative  --    BACTERIA  --  Many*   NITRITE Negative  --    LEUKOCYTESUR 1+*  --    HYALINECASTS  --  1     All labs within the past 24 hours have been reviewed.     Significant Imaging:  Labs: Reviewed  US: Reviewed

## 2018-10-30 NOTE — CONSULTS
Please see my previous consult note.    Joss Marquez MD, MICHAEL  [facility], PGY-2  [facility]  Pager Number [phone]

## 2018-10-30 NOTE — MEDICAL/APP STUDENT
"10/30/2018 9:36 AM  Femi Enciso  1965  [id number]    [facility] Intensive Outpatient [facility]  Admission History & Physical    STATUS:  Intensive Outpatient Program (IOP)    SUBJECTIVE:     History of Present Illness:   Femi Enciso is a 53 y.o. male with a past psychiatric history of MDD and anxiety currently on 20mg Lexapro, who presented to [facility] IOP due to evaluation for liver transplant.    Pt was diagnosed with cirhosis about 1 week ago in [place] by his GI Dr. Janelle Mcqueen in [facility] after the pt presented w/ DEL and bilateral pleural effusions, after which he was found to be in liver failure. Since then the family has decided to come to [facility] to evaluate pt for liver transplant. Pt's last drink was 9/21/2018. He has understanding of his condition, "my liver is shot because I drink too much". Pt has been drinking heavily since 1995, prior to which he was drinking socially. He used to play competitive tennis and the drinking started after their team won their matches. He endorses drinking about 0.6 L of vodka per day "1.75L lasted me about 3 days".     Pt has tried unsuccessfully to quit in the past with 12 attempts but never attended rehab and decided to go "cold turkey" which need in relapse. He had one successful quitting with IOP program in [place] 5 years ago and is willing to retry that again. He never went into severe withdrawal such as DT or hallucinosis but felt agitated and a general feeling of malaise when he tried to quit. He has attended AA meetings but doesn't like them and claims that they are very "morbid" and "limited in [place]" and he "has heard it all". He verbalizes understanding that he needs to make a lifelong commitment to sobriety if he wants a new liver. Daughter and wife at bedside have done some research into online AA meetings and finding a sponsor that the pt can connect with. Pt states that he his motivated to quit and wants to engage in the program after his " medical issues subside. He also has a mental health counselor back in [place] that he is willing to re-engage with.     Substance Abuse History:  Substance of Choice: alcohol  Substances Used: Marijuana in high school  History of IVDU?: No  Use of Alcohol: Yes - last drink 9/21/1987  Average Consumption: 1.75L vodka / 3d  Last Drink: last drink 9/21/1987  Tobacco: No  History of Withdrawals: Yes - agitated and feelings of malaise but no hallucinosis or DT  History of Detox: Yes - cold turkey & once w/ an IOP program successfully after daughter's stroke  Rehab History: No, however did do intensive outpt program  AA/NA: Yes -   Spouse/Partner Consumption: Yes - socially  Patient Aware of Biomedical Complications: Yes    DSM-5 Substance Use Disorder Criteria:  1. Often take in larger amounts or over a longer period of time than was intended: No  2. Persistent desire or unsuccessful efforts to cut down or control use: Yes  3. Great deal of time spent in activities necessary to obtain substance, use, or recover from effects: Yes  4. Craving/strong desire for substance or urge to use: No  5. Use resulting in failure to fulfill major role obligations at home, work or school: Yes  6. Social, occupational, recreational activities decreased because of use: Yes  7. Continued use despite having persistent or recurrent social or interpersonal problems cause or exaserbated by the substance: Yes  8. Recurrent use in situations in which it is physically hazardous: Yes  9. Use despite physical or psychological problems that are likely to have been caused or exacerbated by the substance: Yes  10. Tolerance, as defined by either of the following: No   A. A need for markedly increased amounts of substance to achieve intoxication or desired effect. -OR-    B. A markedly diminished effect with continued use of the same amount of substance.  11. Withdrawal, as manifested by the following: Yes   A. The characteristic withdrawal syndrome for  substance. -AND-   B. Substance is taken to relieve or avoid withdrawal symptoms.  Mild (1-3), Moderate (4-5), Severe (?6)    Psychiatric History:  Diagnoses:  MDD and anxiety  Previous Medication Trials: yes, Lexapro 20 mg   Previous Psychiatric Hospitalizations: yes, 3 day stay at [facility]  Previous Suicide Attempts: no   History of Violence: no  Outpatient Psychiatrist: no    Social History:  Marital Status:   Children: 2   Employment Status: retired  Education: college graduate  Special Ed: no   History: no  Housing Status: own home in [place]  Financial Status: Stable  Leisure/Recreation: tennis, [place], family  Childhood History: good  History of Physical/Sexual Abuse: no  Access to Gun: yes    Legal History:  Past Charges/Incarcerations: no   Pending Charges: no     Family Psychiatric History:   yes, youngest daughter has anxiety    Psychiatric Review Of Systems:  sleep: yes, increased  appetite: yes, decreased  weight: yes, lost weight  energy/anergy: yes, low energy  interest/pleasure/anhedonia: yes, lack of interest  somatic symptoms: yes, headaches  guilty/hopelessness: yes  concentration: yes, decreased  S.I.B.s/risky behavior: no  SI/SA:  no    anxiety/panic: yes, no known trigger  Agoraphobia:  no  Social phobia:  no  Recurrent nightmares:  no  hyper startle response:  no  Avoidance: no  Recurrent thoughts:  no  Recurrent behaviors:  no    Irritability: no  Racing thoughts: no  Impulsive behaviors: no  Pressured speech:  no    Paranoia:no  Delusions: no  AVH:no    Medical Review Of Systems:  Negative except as above    Collateral:   yes    Allergies:  Penicillins  Medical/Surgical History:  Past Medical History:   Diagnosis Date    Liver cirrhosis, alcoholic 09/30/2018     History reviewed. No pertinent surgical history.  OBJECTIVE:     Current Medications:  Infusions:   sodium chloride 0.9% 200 mL/hr at 10/30/18 0000     Scheduled:   albuterol-ipratropium  3 mL Nebulization Q6H  WAKE    ceFEPime (MAXIPIME) IVPB  2 g Intravenous Q24H    folic acid  1 mg Oral Daily    heparin (porcine)  5,000 Units Subcutaneous Q12H    lactulose  30 g Oral Q6H    lidocaine  1 patch Transdermal Q24H    midodrine  10 mg Oral TID    multivitamin  1 tablet Oral Daily    octreotide  100 mcg Intravenous Q8H    pantoprazole  40 mg Oral Daily    rifAXImin  550 mg Oral BID    sevelamer carbonate  800 mg Oral TID WM    sodium bicarbonate  1,300 mg Oral TID    thiamine  100 mg Oral Daily     PRN:  sodium chloride, acetaminophen, dextrose 50%, dextrose 50%, glucagon (human recombinant), glucose, glucose, insulin aspart U-100, magnesium sulfate IVPB, ondansetron, promethazine (PHENERGAN) IVPB, sodium chloride 0.9%, sodium phosphate IVPB, sodium phosphate IVPB, sodium phosphate IVPB, traMADol  Home Medications:  Prior to Admission medications    Medication Sig Start Date End Date Taking? Authorizing Provider   calcium carbonate/vitamin D3 (VITAMIN D-3 ORAL) Take 1 tablet by mouth once daily.   Yes Historical Provider, MD   cyanocobalamin (VITAMIN B-12) 100 MCG tablet Take 100 mcg by mouth once daily.   Yes Historical Provider, MD   folic acid (FOLVITE) 1 MG tablet Take 1 mg by mouth once daily.   Yes Historical Provider, MD   lactulose (CHRONULAC) 10 gram/15 mL solution Take 20 g by mouth 3 (three) times daily.   Yes Historical Provider, MD   multivitamin (THERAGRAN) per tablet Take 1 tablet by mouth once daily.   Yes Historical Provider, MD   omeprazole (PRILOSEC) 40 MG capsule Take 40 mg by mouth once daily.   Yes Historical Provider, MD   ondansetron (ZOFRAN) 4 MG tablet Take 4 mg by mouth daily as needed for Nausea.   Yes Historical Provider, MD   rifAXIMin (XIFAXAN) 550 mg Tab Take 550 mg by mouth 2 (two) times daily.   Yes Historical Provider, MD     Vital Signs:  Vitals:    10/30/18 0900   BP: (!) 106/55   Pulse: 84   Resp: (!) 27   Temp:      Physical Exam:    [unfilled]    Mental Status  "Exam:  Appearance: older than stated age, bearded, lying in bed, thin & gaunt looking  Level of Consciousness: alert  Behavior/Cooperation: normal, friendly and cooperative  Psychomotor: within normal limits   Speech: normal tone, normal rate, normal pitch, normal volume  Language: english, fluid  Orientation: grossly intact, person, place, situation, month of year, stated date of 2019  Attention Span/Concentration: {EXAM; ATTENTION SPAN WORLD:79845}  Memory: Registers and recalls 3/3 objects at 1 and 5 minutes  Mood: "neutral"  Affect: euthymic  Thought Process: linear, normal and logical  Associations: normal and logical  Thought Content: normal, no suicidality, no homicidality, delusions, or paranoia  Fund of Knowledge: Aware of current events  Abstraction: proverbs were abstract, similarities were concrete  Insight: good  Judgment: good    Labs/Imaging/Studies:   Recent Results (from the past 24 hour(s))   Renal function panel    Collection Time: 10/29/18  1:58 PM   Result Value Ref Range    Glucose 183 (H) 70 - 110 mg/dL    Sodium 136 136 - 145 mmol/L    Potassium 4.1 3.5 - 5.1 mmol/L    Chloride 108 95 - 110 mmol/L    CO2 14 (L) 23 - 29 mmol/L    BUN, Bld 104 (H) 6 - 20 mg/dL    Calcium 7.4 (L) 8.7 - 10.5 mg/dL    Creatinine 5.9 (H) 0.5 - 1.4 mg/dL    Albumin 2.5 (L) 3.5 - 5.2 g/dL    Phosphorus 6.5 (H) 2.7 - 4.5 mg/dL    eGFR if  11.6 (A) >60 mL/min/1.73 m^2    eGFR if non African American 10.0 (A) >60 mL/min/1.73 m^2    Anion Gap 14 8 - 16 mmol/L   Magnesium    Collection Time: 10/29/18  1:58 PM   Result Value Ref Range    Magnesium 2.4 1.6 - 2.6 mg/dL   Renal function panel    Collection Time: 10/29/18 10:12 PM   Result Value Ref Range    Glucose 113 (H) 70 - 110 mg/dL    Sodium 138 136 - 145 mmol/L    Potassium 4.0 3.5 - 5.1 mmol/L    Chloride 101 95 - 110 mmol/L    CO2 30 (H) 23 - 29 mmol/L    BUN, Bld 10 6 - 20 mg/dL    Calcium 7.6 (L) 8.7 - 10.5 mg/dL    Creatinine 0.8 0.5 - 1.4 mg/dL    " Albumin 2.5 (L) 3.5 - 5.2 g/dL    Phosphorus 1.4 (L) 2.7 - 4.5 mg/dL    eGFR if African American >60.0 >60 mL/min/1.73 m^2    eGFR if non African American >60.0 >60 mL/min/1.73 m^2    Anion Gap 7 (L) 8 - 16 mmol/L   Magnesium    Collection Time: 10/29/18 10:12 PM   Result Value Ref Range    Magnesium 1.6 1.6 - 2.6 mg/dL   POCT glucose    Collection Time: 10/29/18 10:52 PM   Result Value Ref Range    POCT Glucose 121 (H) 70 - 110 mg/dL   Phosphorus    Collection Time: 10/30/18  4:19 AM   Result Value Ref Range    Phosphorus 4.0 2.7 - 4.5 mg/dL   CBC auto differential    Collection Time: 10/30/18  4:19 AM   Result Value Ref Range    WBC 10.00 3.90 - 12.70 K/uL    RBC 3.35 (L) 4.60 - 6.20 M/uL    Hemoglobin 11.1 (L) 14.0 - 18.0 g/dL    Hematocrit 31.4 (L) 40.0 - 54.0 %    MCV 94 82 - 98 fL    MCH 33.1 (H) 27.0 - 31.0 pg    MCHC 35.4 32.0 - 36.0 g/dL    RDW 19.3 (H) 11.5 - 14.5 %    Platelets 63 (L) 150 - 350 K/uL    MPV 12.8 9.2 - 12.9 fL    Immature Granulocytes 1.1 (H) 0.0 - 0.5 %    Gran # (ANC) 7.2 1.8 - 7.7 K/uL    Immature Grans (Abs) 0.11 (H) 0.00 - 0.04 K/uL    Lymph # 0.9 (L) 1.0 - 4.8 K/uL    Mono # 1.4 (H) 0.3 - 1.0 K/uL    Eos # 0.4 0.0 - 0.5 K/uL    Baso # 0.05 0.00 - 0.20 K/uL    nRBC 0 0 /100 WBC    Gran% 71.6 38.0 - 73.0 %    Lymph% 9.0 (L) 18.0 - 48.0 %    Mono% 13.6 4.0 - 15.0 %    Eosinophil% 4.2 0.0 - 8.0 %    Basophil% 0.5 0.0 - 1.9 %    Differential Method Automated    Comprehensive metabolic panel    Collection Time: 10/30/18  4:19 AM   Result Value Ref Range    Sodium 138 136 - 145 mmol/L    Potassium 3.8 3.5 - 5.1 mmol/L    Chloride 104 95 - 110 mmol/L    CO2 24 23 - 29 mmol/L    Glucose 152 (H) 70 - 110 mg/dL    BUN, Bld 50 (H) 6 - 20 mg/dL    Creatinine 4.0 (H) 0.5 - 1.4 mg/dL    Calcium 7.6 (L) 8.7 - 10.5 mg/dL    Total Protein 5.1 (L) 6.0 - 8.4 g/dL    Albumin 2.4 (L) 3.5 - 5.2 g/dL    Total Bilirubin 37.5 (H) 0.1 - 1.0 mg/dL    Alkaline Phosphatase 180 (H) 55 - 135 U/L     (H) 10 -  40 U/L    ALT 60 (H) 10 - 44 U/L    Anion Gap 10 8 - 16 mmol/L    eGFR if African American 18.5 (A) >60 mL/min/1.73 m^2    eGFR if non  16.0 (A) >60 mL/min/1.73 m^2   Protime-INR    Collection Time: 10/30/18  4:19 AM   Result Value Ref Range    Prothrombin Time 22.4 (H) 9.0 - 12.5 sec    INR 2.3 (H) 0.8 - 1.2   Magnesium    Collection Time: 10/30/18  4:19 AM   Result Value Ref Range    Magnesium 2.0 1.6 - 2.6 mg/dL   Renal function panel    Collection Time: 10/30/18  4:19 AM   Result Value Ref Range    Glucose 152 (H) 70 - 110 mg/dL    Sodium 138 136 - 145 mmol/L    Potassium 3.8 3.5 - 5.1 mmol/L    Chloride 104 95 - 110 mmol/L    CO2 24 23 - 29 mmol/L    BUN, Bld 50 (H) 6 - 20 mg/dL    Calcium 7.6 (L) 8.7 - 10.5 mg/dL    Creatinine 4.0 (H) 0.5 - 1.4 mg/dL    Albumin 2.4 (L) 3.5 - 5.2 g/dL    Phosphorus 4.0 2.7 - 4.5 mg/dL    eGFR if African American 18.5 (A) >60 mL/min/1.73 m^2    eGFR if non  16.0 (A) >60 mL/min/1.73 m^2    Anion Gap 10 8 - 16 mmol/L   Magnesium    Collection Time: 10/30/18  4:19 AM   Result Value Ref Range    Magnesium 2.0 1.6 - 2.6 mg/dL      ASSESSMENT     Femi Enciso is a 53 y.o. male with a past psychiatric history of ***, who presented to [facility] IOP due to ***. ***    IMPRESSION  ***    PLAN     · Start patient on ABU protocol.  · Breathalyzer and urine toxicology daily.  · VS daily x 3 days.  · CBC, CMP, GGT  · Patient counseled on abstinence from ***.    Medications: {Med Mgt:42744}  · ***    Status: {PSY IP PLAN:73040}    @DDM@

## 2018-10-30 NOTE — PROGRESS NOTES
[facility]  Critical Care Medicine  Progress Note    Patient Name: Femi Enciso  MRN: [id number]  Admission Date: 10/27/2018  Hospital Length of Stay: 3 days  Code Status: Full Code  Attending Provider: Tk Carmona MD  Primary Care Provider: Primary Doctor No   Principal Problem: Acute liver failure without hepatic coma    Subjective:     HPI:  54 y/o man with DM2, recent diagnosis of liver failure likely secondary to EtOH abuse in 09/2018, DEL progressing to ESRD admitted to the medicine team for transplant evaluation.   Of note, he was previous in fair health until in September, N/V and diarrhea after sanwitch, resolved, then found have increasing jaundice. Went to hospital, found to have ALI, while he also got hypoxic, CT chest showed large pleural effusion, he was admitted, underwent thoracentesis(removed about 1L), cell study negative for malignancy, and he was treated for PNA and C.diff as well. However, he continue to have weight loss, and start to have AMS, readmitted to hospital, found to have DEL despite ALI. So he was transferred here for eval of liver tx.  Last drink was 9/21/2018.   Denies any fever or chills or chest pain or abdominal pain.           Hospital/ICU Course:  No notes on file    Interval History/Significant Events: pt mental status improved, slightly sob, no abdominal pain or chest pain    Review of Systems  Objective:     Vital Signs (Most Recent):  Temp: 98.8 °F (37.1 °C) (10/30/18 0700)  Pulse: 80 (10/30/18 1000)  Resp: (!) 27 (10/30/18 1000)  BP: (!) 112/59 (10/30/18 1000)  SpO2: (!) 94 % (10/30/18 1000) Vital Signs (24h Range):  Temp:  [98.2 °F (36.8 °C)-98.8 °F (37.1 °C)] 98.8 °F (37.1 °C)  Pulse:  [78-90] 80  Resp:  [19-31] 27  SpO2:  [93 %-97 %] 94 %  BP: ()/(52-69) 112/59   Weight: 82.6 kg (182 lb 1.6 oz)  Body mass index is 26.13 kg/m².      Intake/Output Summary (Last 24 hours) at 10/30/2018 1133  Last data filed at 10/30/2018 1000  Gross per 24  hour   Intake 740 ml   Output 2692 ml   Net -1952 ml       Physical Exam   Constitutional:   Thin, markedly jaundiced male in no distress   HENT:   Mouth/Throat: No oropharyngeal exudate.   Eyes: Scleral icterus is present.   Cardiovascular: Normal rate, regular rhythm and normal heart sounds.   Pulmonary/Chest: Effort normal.   Reduced breath sounds and dullness to percussion to right lung fields.    Abdominal: He exhibits distension. There is no tenderness. There is no rebound and no guarding.   Musculoskeletal: He exhibits no edema.   Skin: Skin is warm and dry.   Markedly jaundiced   Psychiatric: He has a normal mood and affect. His behavior is normal.   Nursing note and vitals reviewed.      Vents:     Lines/Drains/Airways     Central Venous Catheter Line                 Percutaneous Central Line Insertion/Assessment - triple lumen  10/28/18 1740 right internal jugular 1 day          Peripheral Intravenous Line                 Peripheral IV - Single Lumen 10/27/18 1456 Right Antecubital 2 days         Peripheral IV - Single Lumen 10/27/18 1600 Left Hand 2 days              Significant Labs:    CBC/Anemia Profile:  Recent Labs   Lab 10/29/18  0316 10/30/18  0419   WBC 11.76 10.00   HGB 11.1* 11.1*   HCT 32.4* 31.4*   PLT 83* 63*   MCV 96 94   RDW 19.1* 19.3*        Chemistries:  Recent Labs   Lab 10/29/18  0316 10/29/18  1358 10/29/18  2212 10/30/18  0419    136 138 138  138   K 3.8 4.1 4.0 3.8  3.8    108 101 104  104   CO2 16* 14* 30* 24  24   BUN 98* 104* 10 50*  50*   CREATININE 5.4* 5.9* 0.8 4.0*  4.0*   CALCIUM 7.4* 7.4* 7.6* 7.6*  7.6*   ALBUMIN 2.7* 2.5* 2.5* 2.4*  2.4*   PROT 5.3*  --   --  5.1*   BILITOT 39.3*  --   --  37.5*   ALKPHOS 159*  --   --  180*   ALT 64*  --   --  60*   AST 94*  --   --  114*   MG 2.1 2.4 1.6 2.0  2.0   PHOS 6.3* 6.5* 1.4* 4.0  4.0       Recent Lab Results       10/30/18  0419   10/29/18  2252   10/29/18  2212   10/29/18  1358        Immature  Granulocytes 1.1           Immature Grans (Abs) 0.11  Comment:  Mild elevation in immature granulocytes is non specific and   can be seen in a variety of conditions including stress response,   acute inflammation, trauma and pregnancy. Correlation with other   laboratory and clinical findings is essential.             Albumin 2.4   2.5 2.5      2.4           Alkaline Phosphatase 180           ALT 60  Comment:  *Result may be interfered by hyperbilirubinemia           Anion Gap 10   7 14      10                      Baso # 0.05           Basophil% 0.5           Total Bilirubin 37.5  Comment:  For infants and newborns, interpretation of results should be based  on gestational age, weight and in agreement with clinical  observations.  Premature Infant recommended reference ranges:  Up to 24 hours.............<8.0 mg/dL  Up to 48 hours............<12.0 mg/dL  3-5 days..................<15.0 mg/dL  6-29 days.................<15.0 mg/dL             BUN, Bld 50   10 104      50           Calcium 7.6   7.6 7.4      7.6           Chloride 104   101 108      104           CO2 24   30 14      24           Creatinine 4.0  Comment:  *Result may be interfered by hyperbilirubinemia   0.8  Comment:  *Result may be interfered by hyperbilirubinemia 5.9  Comment:  *Result may be interfered by hyperbilirubinemia      4.0  Comment:  *Result may be interfered by hyperbilirubinemia           Differential Method Automated           eGFR if  18.5   >60.0 11.6      18.5           eGFR if non  16.0  Comment:  Calculation used to obtain the estimated glomerular filtration  rate (eGFR) is the CKD-EPI equation.      >60.0  Comment:  Calculation used to obtain the estimated glomerular filtration  rate (eGFR) is the CKD-EPI equation.    10.0  Comment:  Calculation used to obtain the estimated glomerular filtration  rate (eGFR) is the CKD-EPI equation.         16.0  Comment:  Calculation used to obtain the  estimated glomerular filtration  rate (eGFR) is the CKD-EPI equation.              Eos # 0.4           Eosinophil% 4.2           Glucose 152   113 183      152           Gran # (ANC) 7.2           Gran% 71.6           Hematocrit 31.4           Hemoglobin 11.1           Coumadin Monitoring INR 2.3  Comment:  Coumadin Therapy:  2.0 - 3.0 for INR for all indicators except mechanical heart valves  and antiphospholipid syndromes which should use 2.5 - 3.5.             Lymph # 0.9           Lymph% 9.0           Magnesium 2.0   1.6 2.4      2.0           MCH 33.1           MCHC 35.4           MCV 94           Mono # 1.4           Mono% 13.6           MPV 12.8           nRBC 0           Phosphorus 4.0   1.4 6.5      4.0           Platelets 63           POCT Glucose   121         Potassium 3.8   4.0 4.1      3.8           Total Protein 5.1           Protime 22.4           RBC 3.35           RDW 19.3           Sodium 138   138 136      138           WBC 10.00               All pertinent labs within the past 24 hours have been reviewed.    Significant Imaging:  I have reviewed all pertinent imaging results/findings within the past 24 hours.    Assessment/Plan:     Psychiatric   Severe alcohol dependence    - Last drink on 09/21/2018  - Outside window for severe withdrawal     Renal/   Acute cystitis without hematuria    Following urine cultures---Enterobacter   Cefepime     DEL (acute kidney injury)    - Worsening DEL, Uremia,    CRRT for metabolic clearance, mentation markedly improved today.    Will need to transition to HD if BP allows  - Avoid nephrotoxic medications  - Renally adjust medications     Hematology   Thrombocytopenia    - Likely due to liver dz  - no sign of bleeding  - Continue to monitor     Coagulopathy    - INR 2.3 on 10/28 upon admission to ICU   No signs of bleeding   Vitamin K and given 2 doses of FFP. No procedures upcoming, will hold further FFP unless clinical situation changes.      Oncology    Anemia of chronic disease    - h/h stable , continue to monitor     Endocrine   Type 2 diabetes mellitus without complication    - SSI  - Hold home metformin     GI   * Acute liver failure without hepatic coma    MELD-Na score: 43 at 10/30/2018  4:19 AM  MELD score: 43 at 10/30/2018  4:19 AM  Calculated from:  Serum Creatinine: 4 mg/dL at 10/30/2018  4:19 AM  Serum Sodium: 138 mmol/L (Rounded to 137 mmol/L) at 10/30/2018  4:19 AM  Total Bilirubin: 37.5 mg/dL at 10/30/2018  4:19 AM  INR(ratio): 2.3 at 10/30/2018  4:19 AM  Age: 53 years    - Continue albumin, midodrine, lactulose and rifaximin   Had>6BM overnight, decrease lactulose dosing to target BM 3-5 per day  - Hepatology consulted, appreciate evaluation.        Hepatic encephalopathy    - Continue lactulose and rifaximine  - titrate to 3-5 BMs daily     Pleural effusion associated with hepatic disorder    - Likely hepatothorax.    Still sob after CRRT, will repeat CXR   Will likely drain today as pt was complaining about SOB      Hepatorenal syndrome    - Continue midodrine, albumin  - Maintain MAP>65  Dialysis per nephrology     Decompensated hepatic cirrhosis    - see above        Critical Care Daily Checklist:    A: Awake: RASS Goal/Actual Goal:    Actual: [place] Agitation Sedation Scale (RASS): Alert and calm   B: Spontaneous Breathing Trial Performed?     C: SAT & SBT Coordinated?  NA                      D: Delirium: CAM-ICU Overall CAM-ICU: Negative   E: Early Mobility Performed? Yes   F: Feeding Goal:    Status:     Current Diet Order   Procedures    Diet renal [facility]; High Protein/High Calorie; Fluid - 800mL     Order Specific Question:   Indicate patient location for additional diet options:     Answer:   [facility]     Order Specific Question:   Additional Diet Options:     Answer:   High Protein/High Calorie     Order Specific Question:   Fluid restriction:     Answer:   Fluid - 800mL      AS: Analgesia/Sedation na   T:  Thromboembolic Prophylaxis SQH   H: HOB > 300 Yes   U: Stress Ulcer Prophylaxis (if needed) PPI   G: Glucose Control na   B: Bowel Function Stool Occurrence: 1   I: Indwelling Catheter (Lines & Barton) Necessity R IJ CVC   D: De-escalation of Antimicrobials/Pharmacotherapies Continue cefepime    Plan for the day/ETD Dialysis  Possible step down  Possible thoracentesis    Code Status:  Family/Goals of Care: Full Code         Critical secondary to Patient is currently on drug therapy requiring intensive monitoring for toxicity: CRRT      Critical care was time spent personally by me on the following activities: development of treatment plan with patient or surrogate and bedside caregivers, discussions with consultants, evaluation of patient's response to treatment, examination of patient, ordering and performing treatments and interventions, ordering and review of laboratory studies, ordering and review of radiographic studies, pulse oximetry, re-evaluation of patient's condition. This critical care time did not overlap with that of any other provider or involve time for any procedures.     Thalia Momin MD  Critical Care Medicine  [facility]

## 2018-10-30 NOTE — PROGRESS NOTES
[facility]  Nephrology  Progress Note    Patient Name: Femi Enciso  MRN: [id number]  Admission Date: 10/27/2018  Hospital Length of Stay: 3 days  Attending Provider: Tk Carmona MD   Primary Care Physician: Primary Doctor No  Principal Problem:Acute liver failure without hepatic coma    Subjective:     HPI: 52 y/o man with DM2 presents to the ED with family for liver failure (likely due to EtOH abundant history of drinking - diagnosed in Sept 2018 in [place]).  He reports jaundice, generalized weakness, nausea, diarrhea, and decreased appetite since Sept 2018.  He is from [place], [place], and Dr. Sharma (patients physician) recommended bringing him to hospital for evaluation.  Patient denies any fever, chills, vomiting, chest pain, palpitations, SOB, abdominal pain.      Nephrology consulted for evaluation/management Adri.     Interval History: SLED done for total of 8 hrs but it was completed in 2 blocks of 4 hrs due to clotting.. He seems to have coagulation issues. Family at bedside with improving MS slowly. He is hemodynamically stable Off pressors. Oxygenation stable despite large parapneumonic effusion. He had good clearance overnight. Fluid balance Net neg 2017 ml overnight UOP was 75 ml overnight. BB remains elevted at 37.5 mg/dL.    Review of patient's allergies indicates:   Allergen Reactions    Penicillins Nausea And Vomiting and Rash     Current Facility-Administered Medications   Medication Frequency    0.9%  NaCl infusion (CRRT USE ONLY) Continuous    0.9%  NaCl infusion (for blood administration) Q24H PRN    acetaminophen tablet 650 mg Q4H PRN    albuterol-ipratropium 2.5 mg-0.5 mg/3 mL nebulizer solution 3 mL Q6H WAKE    ceFEPIme injection 1 g Q24H    dextrose 50% injection 12.5 g PRN    dextrose 50% injection 25 g PRN    folic acid tablet 1 mg Daily    glucagon (human recombinant) injection 1 mg PRN    glucose chewable tablet 16 g PRN    glucose chewable tablet 24 g PRN     heparin (porcine) injection 5,000 Units Q12H    insulin aspart U-100 pen 0-5 Units QID (AC + HS) PRN    lactulose 20 gram/30 mL solution Soln 30 g TID    lidocaine 5 % patch 1 patch Q24H    midodrine tablet 10 mg TID    multivitamin tablet 1 tablet Daily    ondansetron disintegrating tablet 8 mg Q6H PRN    pantoprazole EC tablet 40 mg Daily    promethazine (PHENERGAN) 12.5 mg in dextrose 5 % 50 mL IVPB Q6H PRN    rifAXIMin tablet 550 mg BID    sevelamer carbonate tablet 800 mg TID WM    sodium bicarbonate tablet 1,300 mg TID    sodium chloride 0.9% flush 5 mL PRN    thiamine tablet 100 mg Daily    traMADol tablet 50 mg Q8H PRN       Objective:     Vital Signs (Most Recent):  Temp: 97.8 °F (36.6 °C) (10/30/18 1100)  Pulse: 78 (10/30/18 1300)  Resp: (!) 21 (10/30/18 1300)  BP: (!) 108/58 (10/30/18 1300)  SpO2: (!) 94 % (10/30/18 1300)  O2 Device (Oxygen Therapy): nasal cannula (10/30/18 1100) Vital Signs (24h Range):  Temp:  [97.8 °F (36.6 °C)-98.8 °F (37.1 °C)] 97.8 °F (36.6 °C)  Pulse:  [78-90] 78  Resp:  [15-31] 21  SpO2:  [93 %-97 %] 94 %  BP: ()/(52-69) 108/58     Weight: 82.6 kg (182 lb 1.6 oz) (10/30/18 0400)  Body mass index is 26.13 kg/m².  Body surface area is 2.02 meters squared.    I/O last 3 completed shifts:  In: 1425 [P.O.:960; I.V.:65; Blood:250; IV Piggyback:150]  Out: 4185 [Urine:275; Other:3060; Stool:850]    Physical Exam   Constitutional: He appears well-developed. He appears cachectic. He is cooperative. No distress. Nasal cannula in place.   Temporal musc wasting   HENT:   Head: Normocephalic and atraumatic.   Eyes: Conjunctivae are normal. Pupils are equal, round, and reactive to light.   Neck: Trachea normal and normal range of motion. Neck supple. No JVD present.   Cardiovascular: Normal rate, regular rhythm, S1 normal, S2 normal and normal pulses. Exam reveals no gallop and no friction rub.   No murmur heard.  Pulmonary/Chest: Effort normal. He has decreased breath  sounds in the right upper field, the right middle field, the right lower field and the left lower field. He has rhonchi in the left lower field.   Abdominal: Soft. Bowel sounds are normal. He exhibits distension and ascites. There is no tenderness.   Musculoskeletal: Normal range of motion. He exhibits no edema.   Neurological: He is alert.   Skin: Skin is warm and dry. Capillary refill takes less than 2 seconds.   Psychiatric: He has a normal mood and affect. His behavior is normal.   Vitals reviewed.      Significant Labs:  ABGs: No results for input(s): PH, PCO2, HCO3, POCSATURATED, BE in the last 168 hours.  BMP:   Recent Labs   Lab 10/30/18  1136   *      CO2 23   BUN 55*   CREATININE 4.6*   CALCIUM 7.5*   MG 2.3     Cardiac Markers: No results for input(s): CKMB, TROPONINT, MYOGLOBIN in the last 168 hours.  CBC:   Recent Labs   Lab 10/30/18  0419   WBC 10.00   RBC 3.35*   HGB 11.1*   HCT 31.4*   PLT 63*   MCV 94   MCH 33.1*   MCHC 35.4     CMP:   Recent Labs   Lab 10/30/18  0419 10/30/18  1136   *  152* 174*   CALCIUM 7.6*  7.6* 7.5*   ALBUMIN 2.4*  2.4* 2.5*   PROT 5.1*  --      138 138   K 3.8  3.8 3.7   CO2 24  24 23     104 104   BUN 50*  50* 55*   CREATININE 4.0*  4.0* 4.6*   ALKPHOS 180*  --    ALT 60*  --    *  --    BILITOT 37.5*  --      Coagulation:   Recent Labs   Lab 10/30/18  0419   INR 2.3*     Recent Labs   Lab 10/27/18  1640 10/27/18  1642   COLORU Jackelyn  --    SPECGRAV 1.010  --    PHUR 5.0  --    PROTEINUA Negative  --    BACTERIA  --  Many*   NITRITE Negative  --    LEUKOCYTESUR 1+*  --    HYALINECASTS  --  1     All labs within the past 24 hours have been reviewed.     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   Plan:  -No evidence of renal recovery remains anuric.  - please continue Albumin admin 25% q 6 hrs x 4 more doses and reassess in setting of low albumin specially if thoracentesis is planned  - Catheter seems to be functioning weil. SKEL with clotting issues. Would like to attempt HD trial in am if hemodynamically  He can tolerate it. If no recovery will need a Perm cath next week  - Renal US with adequate size kidneys no hydro  - UCx GNR > 100 K  - UPCR low  - Strict I/O and chart  - Avoid nephrotoxic medications  - Maintain MAP >65 please continue midodrine  - Hb > 7 gm/dL  - Medication doses adjusted to GFR           Thank you for your consult. I will follow-up with patient. Please contact us if you have any additional questions.    Nikolay Nino MD  Nephrology  [facility]

## 2018-10-30 NOTE — PROGRESS NOTES
CRRT:    Treatment ran for 4 hours until lines clotted off. Rinsed back by primary nurse.     CRRT restarted. UF rate set @300ml/hr

## 2018-10-30 NOTE — PROCEDURES
"Femi Enciso is a 53 y.o. male patient.    Temp: 97.7 °F (36.5 °C) (10/30/18 1500)  Pulse: 78 (10/30/18 1800)  Resp: (!) 26 (10/30/18 1800)  BP: 103/62 (10/30/18 1700)  SpO2: 95 % (10/30/18 1800)  Weight: 82.6 kg (182 lb 1.6 oz) (10/30/18 0400)  Height: 5' 10" (177.8 cm) (10/29/18 1902)       Chest Tube Insertion  Date/Time: 10/30/2018 6:11 PM  Location procedure was performed: [facility] CARDIAC MEDICAL ICU ([facility])  Performed by: Efe Fox MD  Authorized by: Efe Fox MD   Assisting provider: Tk Carmona MD  Post-operative diagnosis: right pleural effusion  Pre-operative diagnosis: right pleural effusion  Consent Done: Yes  Consent: Written consent obtained.  Risks and benefits: risks, benefits and alternatives were discussed  Consent given by: patient  Patient understanding: patient states understanding of the procedure being performed  Patient consent: the patient's understanding of the procedure matches consent given  Procedure consent: procedure consent matches procedure scheduled  Relevant documents: relevant documents present and verified  Site marked: the operative site was marked  Imaging studies: imaging studies available  Patient identity confirmed: , MRN, name and verbally with patient  Time out: Immediately prior to procedure a "time out" was called to verify the correct patient, procedure, equipment, support staff and site/side marked as required.  Indications: pleural effusion  Patient sedated: no  Anesthesia: local infiltration    Anesthesia:  Local Anesthetic: lidocaine 1% with epinephrine  Anesthetic total: 15 mL  Preparation: skin prepped with ChloraPrep  Placement location: right lateral  Scalpel size: 11  Tube size: 8 Frisian  Tube connected to: suction  Drainage characteristics: yellow  Drainage amount: 200 ml  Suture material: 2-0 silk  Dressing: 4x4 sterile gauze  Patient tolerance: Patient tolerated the procedure well with no immediate complications  Complications: " No  Estimated blood loss (mL): 5  Specimens: No  Implants: No          Efe Fox  10/30/2018

## 2018-10-30 NOTE — PROGRESS NOTES
Time out performed for a right chest tube placement. Consents signed and in chart prior to procedure.  MD Mariscal performing procedure under the supervision of MD Carmona.   Pleuvac placed to -20 suction. Ordered to clamp pleuvac at 1.4L of fluid drainage.

## 2018-10-30 NOTE — PLAN OF CARE
Problem: Patient Care Overview  Goal: Plan of Care Review  Outcome: Ongoing (interventions implemented as appropriate)  Pt had no acute events overnight. Remained on 2L O2 via nasal cannula with SaO2 readings 93-96%. NSR without ectopy noted. SBP and MAP remained within ordered parameters.Tolerated small sips of water and Novasource without difficulty. Condom catheter in use, total urine output 34ml for the shift. Pt tolerated last 4 hours of dialysis without issue. Flexiseal removed per pt request. Pt continent of bowel, BM x6 overnight, yellow, watery stool. Skin, sclera, and nailbeds are jaundiced. Pt denies c/o pain and shortness of breath. Addressed concerns and answered questions about plan of care. Pt in no noted distress. Will continue to monitor pt for changes in status.

## 2018-10-30 NOTE — PLAN OF CARE
[facility] [place] [place] - [address], [place]  [address], [place] [address]  Phone: [phone] Fax: [phone]    Payor: BLUE CROSS BLUE SHIELD / Plan: [facility] FEDERAL / Product Type: PPO /     Future Appointments   Date Time Provider Department Center   11/5/2018  8:00 AM Heidi Sharma MD [facility]     Extended Emergency Contact Information  Primary Emergency Contact: valerie vaca  Mobile Phone: [phone]  Relation: Daughter  Secondary Emergency Contact: yojana alvarez  Mobile Phone: [phone]  Relation: Spouse       10/30/18 1537   Discharge Assessment   Assessment Type Discharge Planning Assessment   Confirmed/corrected address and phone number on facesheet? Yes   Assessment information obtained from? Caregiver;Medical Record   Expected Length of Stay (days) 5   Communicated expected length of stay with patient/caregiver no   Prior to hospitilization cognitive status: Alert/Oriented   Prior to hospitalization functional status: Independent   Current cognitive status: Alert/Oriented   Current Functional Status: Needs Assistance   Facility Arrived From: ED admit   Lives With spouse   Able to Return to Prior Arrangements yes   Is patient able to care for self after discharge? Yes   Who are your caregiver(s) and their phone number(s)? yojana alvarez (wife) [phone]   Patient's perception of discharge disposition home or selfcare   Readmission Within The Last 30 Days no previous admission in last 30 days   Patient currently being followed by outpatient case management? No   Patient currently receives any other outside agency services? No   Equipment Currently Used at Home none   Do you have any problems affording any of your prescribed medications? No   Is the patient taking medications as prescribed? yes   Does the patient have transportation home? Yes   Transportation Available family or friend will provide   Does the patient receive  services at the [facility] Clinic? No   Discharge Plan A Home with family   Discharge Plan B Home   Patient/Family In Agreement With Plan yes   Saba Lira RN, BSN, CCM  Case Management  [facility]  Ext. [phone]

## 2018-10-31 LAB
ABO + RH BLD: NORMAL
ALBUMIN SERPL BCP-MCNC: 2.2 G/DL
ALBUMIN SERPL BCP-MCNC: 2.2 G/DL
ALBUMIN SERPL BCP-MCNC: 2.4 G/DL
ALP SERPL-CCNC: 208 U/L
ALT SERPL W/O P-5'-P-CCNC: 55 U/L
ANION GAP SERPL CALC-SCNC: 10 MMOL/L
ANION GAP SERPL CALC-SCNC: 10 MMOL/L
ANION GAP SERPL CALC-SCNC: 11 MMOL/L
AST SERPL-CCNC: 80 U/L
BASOPHILS # BLD AUTO: 0.08 K/UL
BASOPHILS NFR BLD: 0.7 %
BILIRUB SERPL-MCNC: 37.9 MG/DL
BLD GP AB SCN CELLS X3 SERPL QL: NORMAL
BUN SERPL-MCNC: 64 MG/DL
BUN SERPL-MCNC: 65 MG/DL
BUN SERPL-MCNC: 65 MG/DL
CALCIUM SERPL-MCNC: 7.6 MG/DL
CALCIUM SERPL-MCNC: 7.6 MG/DL
CALCIUM SERPL-MCNC: 8 MG/DL
CHLORIDE SERPL-SCNC: 103 MMOL/L
CO2 SERPL-SCNC: 23 MMOL/L
CO2 SERPL-SCNC: 25 MMOL/L
CO2 SERPL-SCNC: 25 MMOL/L
CREAT SERPL-MCNC: 5.6 MG/DL
DIFFERENTIAL METHOD: ABNORMAL
EOSINOPHIL # BLD AUTO: 0.6 K/UL
EOSINOPHIL NFR BLD: 5.1 %
ERYTHROCYTE [DISTWIDTH] IN BLOOD BY AUTOMATED COUNT: 19.7 %
EST. GFR  (AFRICAN AMERICAN): 12.3 ML/MIN/1.73 M^2
EST. GFR  (NON AFRICAN AMERICAN): 10.7 ML/MIN/1.73 M^2
GLUCOSE SERPL-MCNC: 129 MG/DL
GLUCOSE SERPL-MCNC: 134 MG/DL
GLUCOSE SERPL-MCNC: 134 MG/DL
HCT VFR BLD AUTO: 32.8 %
HGB BLD-MCNC: 11.5 G/DL
IMM GRANULOCYTES # BLD AUTO: 0.18 K/UL
IMM GRANULOCYTES NFR BLD AUTO: 1.6 %
INR PPP: 2.3
LYMPHOCYTES # BLD AUTO: 1.2 K/UL
LYMPHOCYTES NFR BLD: 11 %
MAGNESIUM SERPL-MCNC: 2.3 MG/DL
MAGNESIUM SERPL-MCNC: 2.4 MG/DL
MAGNESIUM SERPL-MCNC: 2.4 MG/DL
MCH RBC QN AUTO: 33.4 PG
MCHC RBC AUTO-ENTMCNC: 35.1 G/DL
MCV RBC AUTO: 95 FL
MONOCYTES # BLD AUTO: 1.5 K/UL
MONOCYTES NFR BLD: 13.3 %
NEUTROPHILS # BLD AUTO: 7.6 K/UL
NEUTROPHILS NFR BLD: 68.3 %
NRBC BLD-RTO: 0 /100 WBC
PHOSPHATE SERPL-MCNC: 4.8 MG/DL
PHOSPHATE SERPL-MCNC: 5.4 MG/DL
PHOSPHATE SERPL-MCNC: 5.4 MG/DL
PLATELET # BLD AUTO: 61 K/UL
PMV BLD AUTO: 11.9 FL
POCT GLUCOSE: 154 MG/DL (ref 70–110)
POCT GLUCOSE: 189 MG/DL (ref 70–110)
POTASSIUM SERPL-SCNC: 3.6 MMOL/L
POTASSIUM SERPL-SCNC: 3.6 MMOL/L
POTASSIUM SERPL-SCNC: 3.9 MMOL/L
PROT SERPL-MCNC: 5.1 G/DL
PROTHROMBIN TIME: 22 SEC
RBC # BLD AUTO: 3.44 M/UL
SODIUM SERPL-SCNC: 137 MMOL/L
SODIUM SERPL-SCNC: 138 MMOL/L
SODIUM SERPL-SCNC: 138 MMOL/L
WBC # BLD AUTO: 11.07 K/UL

## 2018-10-31 PROCEDURE — 25000003 PHARM REV CODE 250: Mod: NTX | Performed by: STUDENT IN AN ORGANIZED HEALTH CARE EDUCATION/TRAINING PROGRAM

## 2018-10-31 PROCEDURE — 94761 N-INVAS EAR/PLS OXIMETRY MLT: CPT | Mod: NTX

## 2018-10-31 PROCEDURE — 86901 BLOOD TYPING SEROLOGIC RH(D): CPT | Mod: NTX

## 2018-10-31 PROCEDURE — 83735 ASSAY OF MAGNESIUM: CPT | Mod: NTX

## 2018-10-31 PROCEDURE — 83735 ASSAY OF MAGNESIUM: CPT | Mod: 91,NTX

## 2018-10-31 PROCEDURE — 85610 PROTHROMBIN TIME: CPT | Mod: NTX

## 2018-10-31 PROCEDURE — 99232 SBSQ HOSP IP/OBS MODERATE 35: CPT | Mod: NTX,,, | Performed by: INTERNAL MEDICINE

## 2018-10-31 PROCEDURE — 25000003 PHARM REV CODE 250: Mod: NTX | Performed by: INTERNAL MEDICINE

## 2018-10-31 PROCEDURE — 63600175 PHARM REV CODE 636 W HCPCS: Mod: NTX | Performed by: STUDENT IN AN ORGANIZED HEALTH CARE EDUCATION/TRAINING PROGRAM

## 2018-10-31 PROCEDURE — 99233 SBSQ HOSP IP/OBS HIGH 50: CPT | Mod: NTX,,, | Performed by: INTERNAL MEDICINE

## 2018-10-31 PROCEDURE — 94640 AIRWAY INHALATION TREATMENT: CPT | Mod: NTX

## 2018-10-31 PROCEDURE — 97802 MEDICAL NUTRITION INDIV IN: CPT | Mod: NTX

## 2018-10-31 PROCEDURE — 99233 PR SUBSEQUENT HOSPITAL CARE,LEVL III: ICD-10-PCS | Mod: NTX,,, | Performed by: INTERNAL MEDICINE

## 2018-10-31 PROCEDURE — 25000003 PHARM REV CODE 250: Mod: NTX | Performed by: HOSPITALIST

## 2018-10-31 PROCEDURE — 25000242 PHARM REV CODE 250 ALT 637 W/ HCPCS: Mod: NTX | Performed by: HOSPITALIST

## 2018-10-31 PROCEDURE — 80069 RENAL FUNCTION PANEL: CPT | Mod: NTX

## 2018-10-31 PROCEDURE — 20000000 HC ICU ROOM: Mod: NTX

## 2018-10-31 PROCEDURE — 27000221 HC OXYGEN, UP TO 24 HOURS: Mod: NTX

## 2018-10-31 PROCEDURE — 99232 PR SUBSEQUENT HOSPITAL CARE,LEVL II: ICD-10-PCS | Mod: NTX,,, | Performed by: INTERNAL MEDICINE

## 2018-10-31 PROCEDURE — 80100014 HC HEMODIALYSIS 1:1: Mod: NTX

## 2018-10-31 PROCEDURE — 84100 ASSAY OF PHOSPHORUS: CPT | Mod: NTX

## 2018-10-31 PROCEDURE — 63600175 PHARM REV CODE 636 W HCPCS: Mod: JG,NTX | Performed by: INTERNAL MEDICINE

## 2018-10-31 PROCEDURE — P9045 ALBUMIN (HUMAN), 5%, 250 ML: HCPCS | Mod: JG,NTX | Performed by: INTERNAL MEDICINE

## 2018-10-31 PROCEDURE — 85025 COMPLETE CBC W/AUTO DIFF WBC: CPT | Mod: NTX

## 2018-10-31 PROCEDURE — 80053 COMPREHEN METABOLIC PANEL: CPT | Mod: NTX

## 2018-10-31 RX ORDER — ALBUMIN HUMAN 250 G/1000ML
25 SOLUTION INTRAVENOUS EVERY 6 HOURS
Status: DISCONTINUED | OUTPATIENT
Start: 2018-10-31 | End: 2018-10-31

## 2018-10-31 RX ORDER — SODIUM CHLORIDE 9 MG/ML
INJECTION, SOLUTION INTRAVENOUS ONCE
Status: DISCONTINUED | OUTPATIENT
Start: 2018-10-31 | End: 2018-11-02

## 2018-10-31 RX ORDER — LACTULOSE 10 G/15ML
30 SOLUTION ORAL 2 TIMES DAILY
Status: DISCONTINUED | OUTPATIENT
Start: 2018-10-31 | End: 2018-11-01

## 2018-10-31 RX ORDER — CEFEPIME HYDROCHLORIDE 1 G/1
1 INJECTION, POWDER, FOR SOLUTION INTRAMUSCULAR; INTRAVENOUS
Status: COMPLETED | OUTPATIENT
Start: 2018-10-31 | End: 2018-11-02

## 2018-10-31 RX ORDER — LIDOCAINE 50 MG/G
1 PATCH TOPICAL
Status: DISCONTINUED | OUTPATIENT
Start: 2018-10-31 | End: 2018-11-11

## 2018-10-31 RX ORDER — SODIUM CHLORIDE 9 MG/ML
INJECTION, SOLUTION INTRAVENOUS
Status: DISCONTINUED | OUTPATIENT
Start: 2018-10-31 | End: 2018-11-02

## 2018-10-31 RX ORDER — ALBUMIN HUMAN 50 G/1000ML
25 SOLUTION INTRAVENOUS EVERY 6 HOURS
Status: COMPLETED | OUTPATIENT
Start: 2018-10-31 | End: 2018-11-01

## 2018-10-31 RX ADMIN — SODIUM BICARBONATE 650 MG TABLET 1300 MG: at 02:10

## 2018-10-31 RX ADMIN — IPRATROPIUM BROMIDE AND ALBUTEROL SULFATE 3 ML: .5; 3 SOLUTION RESPIRATORY (INHALATION) at 01:10

## 2018-10-31 RX ADMIN — RIFAXIMIN 550 MG: 550 TABLET ORAL at 09:10

## 2018-10-31 RX ADMIN — PANTOPRAZOLE SODIUM 40 MG: 40 TABLET, DELAYED RELEASE ORAL at 09:10

## 2018-10-31 RX ADMIN — MIDODRINE HYDROCHLORIDE 10 MG: 5 TABLET ORAL at 09:10

## 2018-10-31 RX ADMIN — IPRATROPIUM BROMIDE AND ALBUTEROL SULFATE 3 ML: .5; 3 SOLUTION RESPIRATORY (INHALATION) at 08:10

## 2018-10-31 RX ADMIN — Medication 100 MG: at 09:10

## 2018-10-31 RX ADMIN — MIDODRINE HYDROCHLORIDE 10 MG: 5 TABLET ORAL at 02:10

## 2018-10-31 RX ADMIN — ALBUMIN HUMAN 25 G: 0.05 INJECTION, SOLUTION INTRAVENOUS at 05:10

## 2018-10-31 RX ADMIN — ALBUMIN HUMAN 25 G: 0.05 INJECTION, SOLUTION INTRAVENOUS at 12:10

## 2018-10-31 RX ADMIN — ACETAMINOPHEN 650 MG: 325 TABLET, FILM COATED ORAL at 05:10

## 2018-10-31 RX ADMIN — SODIUM CHLORIDE 300 ML: 0.9 INJECTION, SOLUTION INTRAVENOUS at 09:10

## 2018-10-31 RX ADMIN — HEPARIN SODIUM 5000 UNITS: 5000 INJECTION, SOLUTION INTRAVENOUS; SUBCUTANEOUS at 09:10

## 2018-10-31 RX ADMIN — SODIUM BICARBONATE 650 MG TABLET 1300 MG: at 09:10

## 2018-10-31 RX ADMIN — LACTULOSE 30 G: 20 SOLUTION ORAL at 09:10

## 2018-10-31 RX ADMIN — LACTULOSE 30 G: 20 SOLUTION ORAL at 08:10

## 2018-10-31 RX ADMIN — THERA TABS 1 TABLET: TAB at 09:10

## 2018-10-31 RX ADMIN — LIDOCAINE 1 PATCH: 50 PATCH CUTANEOUS at 08:10

## 2018-10-31 RX ADMIN — FOLIC ACID 1 MG: 1 TABLET ORAL at 09:10

## 2018-10-31 RX ADMIN — CEFEPIME 1 G: 1 INJECTION, POWDER, FOR SOLUTION INTRAMUSCULAR; INTRAVENOUS at 09:10

## 2018-10-31 NOTE — ASSESSMENT & PLAN NOTE
- Likely hepatothorax.    S/p R CT placement 10/31    Continue drainage for about 1.4L, then clamp it

## 2018-10-31 NOTE — NURSING
CCS team informed of patient's new onset, sharp,  6/10 pain in RUQ/near chest tube site. Pain is increased w/ inhalation & patient finds it painful to take a deep breath

## 2018-10-31 NOTE — ASSESSMENT & PLAN NOTE
- DEL, Uremia,    CRRT for metabolic clearance, mentation markedly improved today.    Will attempt HD today  - Avoid nephrotoxic medications  - Renally adjust medications

## 2018-10-31 NOTE — PLAN OF CARE
Problem: Patient Care Overview  Goal: Plan of Care Review  Outcome: Ongoing (interventions implemented as appropriate)  No acute events overnight. Pt remained oriented x4. Remains on 2L O2 via nasal cannula. Chest tube clamped per MD order. Pt has poor appetite, watery BM x4 overnight. Pt did not void overnight, CRRT on hold. Pt in no noted distress. Denies c/o pain or shortness of breath. Addressed concerns and answered questions about plan of care. Will continue to monitor pt for changes in status.

## 2018-10-31 NOTE — SUBJECTIVE & OBJECTIVE
Interval History/Significant Events: Underwent R chest tube yesterday, tube clapmed after 1.4L drainage  Reports feeling better w breathing  Have about 9-10 BM yesterday    Review of Systems  Objective:     Vital Signs (Most Recent):  Temp: 97.9 °F (36.6 °C) (10/31/18 0300)  Pulse: 80 (10/31/18 0600)  Resp: (!) 24 (10/31/18 0600)  BP: 105/62 (10/31/18 0600)  SpO2: 95 % (10/31/18 0600) Vital Signs (24h Range):  Temp:  [97.6 °F (36.4 °C)-97.9 °F (36.6 °C)] 97.9 °F (36.6 °C)  Pulse:  [72-87] 80  Resp:  [15-33] 24  SpO2:  [92 %-98 %] 95 %  BP: ()/(54-63) 105/62   Weight: 82.6 kg (182 lb 1.6 oz)  Body mass index is 26.13 kg/m².      Intake/Output Summary (Last 24 hours) at 10/31/2018 0825  Last data filed at 10/30/2018 1800  Gross per 24 hour   Intake 490 ml   Output 1500 ml   Net -1010 ml       Physical Exam   Constitutional:   Thin, jaundice improved significantly   HENT:   Mouth/Throat: No oropharyngeal exudate.   Eyes: Scleral icterus is present.   Cardiovascular: Normal rate, regular rhythm and normal heart sounds.   Pulmonary/Chest: Effort normal.   Reduced breath sounds and dullness to percussion to right lung fields.   Right side chest tube with clear strawcolor output   Abdominal: He exhibits distension. There is no tenderness. There is no rebound and no guarding.   Musculoskeletal: He exhibits no edema.   Skin: Skin is warm and dry.   Improved jaundiced   Psychiatric: He has a normal mood and affect. His behavior is normal.   Nursing note and vitals reviewed.      Vents:  Oxygen Concentration (%): 28 (10/30/18 2116)  Lines/Drains/Airways     Central Venous Catheter Line                 Percutaneous Central Line Insertion/Assessment - triple lumen  10/28/18 1740 right internal jugular 2 days          Drain                 Chest Tube 10/30/18 1730 1 Right less than 1 day          Peripheral Intravenous Line                 Peripheral IV - Single Lumen 10/27/18 1456 Right Antecubital 3 days          Peripheral IV - Single Lumen 10/27/18 1600 Left Hand 3 days              Significant Labs:    CBC/Anemia Profile:  Recent Labs   Lab 10/30/18  0419 10/31/18  0358   WBC 10.00 11.07   HGB 11.1* 11.5*   HCT 31.4* 32.8*   PLT 63* 61*   MCV 94 95   RDW 19.3* 19.7*        Chemistries:  Recent Labs   Lab 10/30/18  0419 10/30/18  1136 10/30/18  2158 10/31/18  0358     138 138 136 138  138   K 3.8  3.8 3.7 3.6 3.6  3.6     104 104 102 103  103   CO2 24  24 23 24 25  25   BUN 50*  50* 55* 63* 65*  65*   CREATININE 4.0*  4.0* 4.6* 5.4* 5.6*  5.6*   CALCIUM 7.6*  7.6* 7.5* 7.8* 7.6*  7.6*   ALBUMIN 2.4*  2.4* 2.5* 2.3* 2.2*  2.2*   PROT 5.1*  --   --  5.1*   BILITOT 37.5*  --   --  37.9*   ALKPHOS 180*  --   --  208*   ALT 60*  --   --  55*   *  --   --  80*   MG 2.0  2.0 2.3 2.3 2.4  2.4   PHOS 4.0  4.0 4.2 4.9* 5.4*  5.4*       All pertinent labs within the past 24 hours have been reviewed.    Significant Imaging:  I have reviewed all pertinent imaging results/findings within the past 24 hours.

## 2018-10-31 NOTE — ASSESSMENT & PLAN NOTE
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   Plan:  - No evidence of renal recovery remains anuric.  - Will attempt HD trial today for metabolic clearance x 3 hrs no UF since he has large volume thoracentesis 1.4 lts and plan to take more overnight  - will need perm cath if no evidence of recovery  - Renal US with adequate size kidneys no hydro  - UCx  Enterococcus Cloacae  > 100 K on Cefepime which is sensitive  - UPCR low  - Strict I/O and chart  - Avoid nephrotoxic medications  - Maintain MAP >65 please continue midodrine  - Hb > 7 gm/dL  - Medication doses adjusted to GFR

## 2018-10-31 NOTE — PROGRESS NOTES
[facility]  Nephrology  Progress Note    Patient Name: Femi Enciso  MRN: [id number]  Admission Date: 10/27/2018  Hospital Length of Stay: 4 days  Attending Provider: Tk Carmona MD   Primary Care Physician: Primary Doctor No  Principal Problem:Acute liver failure without hepatic coma    Subjective:     HPI: 54 y/o man with DM2 presents to the ED with family for liver failure (likely due to EtOH abundant history of drinking - diagnosed in Sept 2018 in [place]).  He reports jaundice, generalized weakness, nausea, diarrhea, and decreased appetite since Sept 2018.  He is from [place], [place], and Dr. Sharma (patients physician) recommended bringing him to hospital for evaluation.  Patient denies any fever, chills, vomiting, chest pain, palpitations, SOB, abdominal pain.      Nephrology consulted for evaluation/management Adri.     Interval History:   Thoracentesis done with pig tail catheter placed drained 1450 ml overnight UOP 50 cc. Family at bedside with improving MS slowly. He is hemodynamically stable Off pressors. Oxygenation improved now on RA with pleural effusion drained. Fluid balance Net neg 1000 ml overnight. BB remains elevted at 37.9 mg/dL.    Review of patient's allergies indicates:   Allergen Reactions    Penicillins Nausea And Vomiting and Rash     Current Facility-Administered Medications   Medication Frequency    0.9%  NaCl infusion (for blood administration) Q24H PRN    0.9%  NaCl infusion PRN    0.9%  NaCl infusion Once    acetaminophen tablet 650 mg Q4H PRN    albumin human 5% bottle 25 g Q6H    albuterol-ipratropium 2.5 mg-0.5 mg/3 mL nebulizer solution 3 mL Q6H WAKE    ceFEPIme injection 1 g Q24H    dextrose 50% injection 12.5 g PRN    dextrose 50% injection 25 g PRN    folic acid tablet 1 mg Daily    glucagon (human recombinant) injection 1 mg PRN    glucose chewable tablet 16 g PRN    glucose chewable tablet 24 g PRN    heparin (porcine) injection 5,000 Units  Q12H    insulin aspart U-100 pen 0-5 Units QID (AC + HS) PRN    lactulose 20 gram/30 mL solution Soln 30 g BID    lidocaine 5 % patch 1 patch Q24H    lidocaine 5 % patch 1 patch Q24H    midodrine tablet 10 mg TID    multivitamin tablet 1 tablet Daily    ondansetron disintegrating tablet 8 mg Q6H PRN    pantoprazole EC tablet 40 mg Daily    promethazine (PHENERGAN) 12.5 mg in dextrose 5 % 50 mL IVPB Q6H PRN    rifAXIMin tablet 550 mg BID    sevelamer carbonate tablet 800 mg TID WM    sodium bicarbonate tablet 1,300 mg TID    sodium chloride 0.9% flush 5 mL PRN    thiamine tablet 100 mg Daily    traMADol tablet 50 mg Q8H PRN       Objective:     Vital Signs (Most Recent):  Temp: 97.9 °F (36.6 °C) (10/31/18 0300)  Pulse: 88 (10/31/18 1344)  Resp: (!) 24 (10/31/18 1344)  BP: 105/62 (10/31/18 0600)  SpO2: (!) 94 % (10/31/18 1344)  O2 Device (Oxygen Therapy): room air (10/31/18 1344) Vital Signs (24h Range):  Temp:  [97.6 °F (36.4 °C)-97.9 °F (36.6 °C)] 97.9 °F (36.6 °C)  Pulse:  [72-88] 88  Resp:  [19-33] 24  SpO2:  [92 %-98 %] 94 %  BP: ()/(54-63) 105/62     Weight: 82.6 kg (182 lb 1.6 oz) (10/31/18 1300)  Body mass index is 26.13 kg/m².  Body surface area is 2.02 meters squared.    I/O last 3 completed shifts:  In: 1035 [P.O.:920; I.V.:115]  Out: 2889 [Urine:84; Other:955; Stool:400; Chest Tube:1450]    Physical Exam   Constitutional: He appears well-developed. He appears cachectic. He is cooperative. No distress. Nasal cannula in place.   Temporal musc wasting   HENT:   Head: Normocephalic and atraumatic.   Eyes: Conjunctivae are normal. Pupils are equal, round, and reactive to light.   Neck: Trachea normal and normal range of motion. Neck supple. No JVD present.   Cardiovascular: Normal rate, regular rhythm, S1 normal, S2 normal and normal pulses. Exam reveals no gallop and no friction rub.   No murmur heard.  Pulmonary/Chest: Effort normal. He has decreased breath sounds in the right upper  field, the right middle field, the right lower field and the left lower field. He has rhonchi in the left lower field.   Abdominal: Soft. Bowel sounds are normal. He exhibits distension and ascites. There is no tenderness.   Musculoskeletal: Normal range of motion. He exhibits no edema.   Neurological: He is alert.   Skin: Skin is warm and dry. Capillary refill takes less than 2 seconds.   Psychiatric: He has a normal mood and affect. His behavior is normal.   Vitals reviewed.      Significant Labs:  ABGs: No results for input(s): PH, PCO2, HCO3, POCSATURATED, BE in the last 168 hours.  BMP:   Recent Labs   Lab 10/31/18  0358   *  134*     103   CO2 25  25   BUN 65*  65*   CREATININE 5.6*  5.6*   CALCIUM 7.6*  7.6*   MG 2.4  2.4     Cardiac Markers: No results for input(s): CKMB, TROPONINT, MYOGLOBIN in the last 168 hours.  CBC:   Recent Labs   Lab 10/31/18  0358   WBC 11.07   RBC 3.44*   HGB 11.5*   HCT 32.8*   PLT 61*   MCV 95   MCH 33.4*   MCHC 35.1     CMP:   Recent Labs   Lab 10/31/18  0358   *  134*   CALCIUM 7.6*  7.6*   ALBUMIN 2.2*  2.2*   PROT 5.1*     138   K 3.6  3.6   CO2 25  25     103   BUN 65*  65*   CREATININE 5.6*  5.6*   ALKPHOS 208*   ALT 55*   AST 80*   BILITOT 37.9*     Coagulation:   Recent Labs   Lab 10/31/18  0358   INR 2.3*     Recent Labs   Lab 10/27/18  1640 10/27/18  1642   COLORU Jackelyn  --    SPECGRAV 1.010  --    PHUR 5.0  --    PROTEINUA Negative  --    BACTERIA  --  Many*   NITRITE Negative  --    LEUKOCYTESUR 1+*  --    HYALINECASTS  --  1     All labs within the past 24 hours have been reviewed.     Significant Imaging:  Labs: Reviewed  US: Reviewed    Assessment/Plan:     DEL (acute kidney injury)    DEL oliguric with unknown baseline sCr, most likely suspect iATN multifactorial from ischemia hypotension/volume depletion ( high output diarrhea) and possible pigmented nephropathy in setting of very high BB 39-40 and component of HRS  physiology.   Plan:  - No evidence of renal recovery remains anuric.  - Will attempt HD trial today for metabolic clearance x 3 hrs no UF since he has large volume thoracentesis 1.4 lts and plan to take more overnight  - will need perm cath if no evidence of recovery  - Renal US with adequate size kidneys no hydro  - UCx  Enterococcus Cloacae  > 100 K on Cefepime which is sensitive  - UPCR low  - Strict I/O and chart  - Avoid nephrotoxic medications  - Maintain MAP >65 please continue midodrine  - Hb > 7 gm/dL  - Medication doses adjusted to GFR           Thank you for your consult. I will follow-up with patient. Please contact us if you have any additional questions.    Nikolay Nino MD  Nephrology  [facility]

## 2018-10-31 NOTE — PROGRESS NOTES
[facility]  Critical Care Medicine  Progress Note    Patient Name: Femi Enciso  MRN: [id number]  Admission Date: 10/27/2018  Hospital Length of Stay: 4 days  Code Status: Full Code  Attending Provider: Tk Carmona MD  Primary Care Provider: Primary Doctor No   Principal Problem: Acute liver failure without hepatic coma    Subjective:     HPI:  52 y/o man with DM2, recent diagnosis of liver failure likely secondary to EtOH abuse in 09/2018, DEL progressing to ESRD admitted to the medicine team for transplant evaluation.   Of note, he was previous in fair health until in September, N/V and diarrhea after sanwitch, resolved, then found have increasing jaundice. Went to hospital, found to have ALI, while he also got hypoxic, CT chest showed large pleural effusion, he was admitted, underwent thoracentesis(removed about 1L), cell study negative for malignancy, and he was treated for PNA and C.diff as well. However, he continue to have weight loss, and start to have AMS, readmitted to hospital, found to have DEL despite ALI. So he was transferred here for eval of liver tx.  Last drink was 9/21/2018.   Denies any fever or chills or chest pain or abdominal pain.           Hospital/ICU Course:  No notes on file    Interval History/Significant Events: Underwent R chest tube yesterday, tube clapmed after 1.4L drainage  Reports feeling better w breathing  Have about 9-10 BM yesterday    Review of Systems  Objective:     Vital Signs (Most Recent):  Temp: 97.9 °F (36.6 °C) (10/31/18 0300)  Pulse: 80 (10/31/18 0600)  Resp: (!) 24 (10/31/18 0600)  BP: 105/62 (10/31/18 0600)  SpO2: 95 % (10/31/18 0600) Vital Signs (24h Range):  Temp:  [97.6 °F (36.4 °C)-97.9 °F (36.6 °C)] 97.9 °F (36.6 °C)  Pulse:  [72-87] 80  Resp:  [15-33] 24  SpO2:  [92 %-98 %] 95 %  BP: ()/(54-63) 105/62   Weight: 82.6 kg (182 lb 1.6 oz)  Body mass index is 26.13 kg/m².      Intake/Output Summary (Last 24 hours) at 10/31/2018  0825  Last data filed at 10/30/2018 1800  Gross per 24 hour   Intake 490 ml   Output 1500 ml   Net -1010 ml       Physical Exam   Constitutional:   Thin, jaundice improved significantly   HENT:   Mouth/Throat: No oropharyngeal exudate.   Eyes: Scleral icterus is present.   Cardiovascular: Normal rate, regular rhythm and normal heart sounds.   Pulmonary/Chest: Effort normal.   Reduced breath sounds and dullness to percussion to right lung fields.   Right side chest tube with clear strawcolor output   Abdominal: He exhibits distension. There is no tenderness. There is no rebound and no guarding.   Musculoskeletal: He exhibits no edema.   Skin: Skin is warm and dry.   Improved jaundiced   Psychiatric: He has a normal mood and affect. His behavior is normal.   Nursing note and vitals reviewed.      Vents:  Oxygen Concentration (%): 28 (10/30/18 2116)  Lines/Drains/Airways     Central Venous Catheter Line                 Percutaneous Central Line Insertion/Assessment - triple lumen  10/28/18 1740 right internal jugular 2 days          Drain                 Chest Tube 10/30/18 1730 1 Right less than 1 day          Peripheral Intravenous Line                 Peripheral IV - Single Lumen 10/27/18 1456 Right Antecubital 3 days         Peripheral IV - Single Lumen 10/27/18 1600 Left Hand 3 days              Significant Labs:    CBC/Anemia Profile:  Recent Labs   Lab 10/30/18  0419 10/31/18  0358   WBC 10.00 11.07   HGB 11.1* 11.5*   HCT 31.4* 32.8*   PLT 63* 61*   MCV 94 95   RDW 19.3* 19.7*        Chemistries:  Recent Labs   Lab 10/30/18  0419 10/30/18  1136 10/30/18  2158 10/31/18  0358     138 138 136 138  138   K 3.8  3.8 3.7 3.6 3.6  3.6     104 104 102 103  103   CO2 24  24 23 24 25  25   BUN 50*  50* 55* 63* 65*  65*   CREATININE 4.0*  4.0* 4.6* 5.4* 5.6*  5.6*   CALCIUM 7.6*  7.6* 7.5* 7.8* 7.6*  7.6*   ALBUMIN 2.4*  2.4* 2.5* 2.3* 2.2*  2.2*   PROT 5.1*  --   --  5.1*   BILITOT 37.5*  --    --  37.9*   ALKPHOS 180*  --   --  208*   ALT 60*  --   --  55*   *  --   --  80*   MG 2.0  2.0 2.3 2.3 2.4  2.4   PHOS 4.0  4.0 4.2 4.9* 5.4*  5.4*       All pertinent labs within the past 24 hours have been reviewed.    Significant Imaging:  I have reviewed all pertinent imaging results/findings within the past 24 hours.    Assessment/Plan:     Psychiatric   Severe alcohol dependence    - Last drink on 09/21/2018  - Outside window for severe withdrawal     Renal/   Acute cystitis without hematuria    Following urine cultures---Enterobacter   Cefepime for total of 7 day course     DEL (acute kidney injury)    - DEL, Uremia,    CRRT for metabolic clearance, mentation markedly improved today.    Will attempt HD today  - Avoid nephrotoxic medications  - Renally adjust medications     Hematology   Thrombocytopenia    - Likely due to liver dz  - no sign of bleeding  - Continue to monitor     Coagulopathy    - INR 2.3 on 10/28 upon admission to ICU   No signs of bleeding   Vitamin K and given 2 doses of FFP. No procedures upcoming, will hold further FFP unless clinical situation changes.      Oncology   Anemia of chronic disease    - h/h stable , continue to monitor     Endocrine   Type 2 diabetes mellitus without complication    - SSI  - Hold home metformin     GI   * Acute liver failure without hepatic coma    MELD-Na score: 43 at 10/31/2018  3:58 AM  MELD score: 43 at 10/31/2018  3:58 AM  Calculated from:  Serum Creatinine: 5.6 mg/dL (Rounded to 4 mg/dL) at 10/31/2018  3:58 AM  Serum Sodium: 138 mmol/L (Rounded to 137 mmol/L) at 10/31/2018  3:58 AM  Total Bilirubin: 37.9 mg/dL at 10/31/2018  3:58 AM  INR(ratio): 2.3 at 10/31/2018  3:58 AM  Age: 53 years    - Continue albumin, midodrine, lactulose and rifaximin   Had>4BM overnight, decrease lactulose dosing to target BM 3-5 per day  - Hepatology consulted, appreciate evaluation.        Hepatic encephalopathy    - Continue lactulose and rifaximine  -  titrate to 3-5 BMs daily     Pleural effusion associated with hepatic disorder    - Likely hepatothorax.    S/p R CT placement 10/31    Continue drainage for about 1.4L, then clamp it     Hepatorenal syndrome    - Continue midodrine, albumin  - Maintain MAP>65  Dialysis per nephrology     Decompensated hepatic cirrhosis    - see above        Critical Care Daily Checklist:    A: Awake: RASS Goal/Actual Goal:    Actual: [place] Agitation Sedation Scale (RASS): Alert and calm   B: Spontaneous Breathing Trial Performed?     C: SAT & SBT Coordinated?  NA                      D: Delirium: CAM-ICU Overall CAM-ICU: Negative   E: Early Mobility Performed? Yes   F: Feeding Goal:    Status:     Current Diet Order   Procedures    Diet renal [facility]; High Protein/High Calorie; Fluid - 800mL     Order Specific Question:   Indicate patient location for additional diet options:     Answer:   [facility]     Order Specific Question:   Additional Diet Options:     Answer:   High Protein/High Calorie     Order Specific Question:   Fluid restriction:     Answer:   Fluid - 800mL      AS: Analgesia/Sedation NA   T: Thromboembolic Prophylaxis SQH   H: HOB > 300 Yes   U: Stress Ulcer Prophylaxis (if needed) PPI   G: Glucose Control na   B: Bowel Function Stool Occurrence: 2   I: Indwelling Catheter (Lines & Barton) Necessity Dialysis cath   D: De-escalation of Antimicrobials/Pharmacotherapies     Plan for the day/ETD HD    Code Status:  Family/Goals of Care: Full Code         Critical secondary to Patient is currently on drug therapy requiring intensive monitoring for toxicity: possible step down if tolerate HD      Critical care was time spent personally by me on the following activities: development of treatment plan with patient or surrogate and bedside caregivers, discussions with consultants, evaluation of patient's response to treatment, examination of patient, ordering and performing treatments and interventions,  ordering and review of laboratory studies, ordering and review of radiographic studies, pulse oximetry, re-evaluation of patient's condition. This critical care time did not overlap with that of any other provider or involve time for any procedures.     Thalia Momin MD  Critical Care Medicine  [facility]

## 2018-10-31 NOTE — NURSING
CCS team notified of patient's increased pain near chest tube site/RUQ. Patient says that it is quite painful to attempt to take a deep breath & hurts when he is breathing normally as well.

## 2018-10-31 NOTE — ASSESSMENT & PLAN NOTE
MELD-Na score: 43 at 10/31/2018  3:58 AM  MELD score: 43 at 10/31/2018  3:58 AM  Calculated from:  Serum Creatinine: 5.6 mg/dL (Rounded to 4 mg/dL) at 10/31/2018  3:58 AM  Serum Sodium: 138 mmol/L (Rounded to 137 mmol/L) at 10/31/2018  3:58 AM  Total Bilirubin: 37.9 mg/dL at 10/31/2018  3:58 AM  INR(ratio): 2.3 at 10/31/2018  3:58 AM  Age: 53 years    - Continue albumin, midodrine, lactulose and rifaximin   Had>4BM overnight, decrease lactulose dosing to target BM 3-5 per day  - Hepatology consulted, appreciate evaluation.

## 2018-10-31 NOTE — NURSING
CCS team notified of patient's increased pain. PRN Tylenol given. CCS team states there is no need for chest x-ray at this point, as they are hopefully going to remove the chest tube this afternoon.

## 2018-10-31 NOTE — PLAN OF CARE
Problem: Patient Care Overview  Goal: Plan of Care Review  Outcome: Ongoing (interventions implemented as appropriate)  Recommendations     1. Continue current Renal, High protein/calorie diet + Novasource ONS (fluid restriction per MD).   2. RD to monitor & follow-up.

## 2018-10-31 NOTE — CONSULTS
"  [facility]  Adult Nutrition  Consult Note    SUMMARY     Recommendations    1. Continue current Renal, High protein/calorie diet + Novasource ONS (fluid restriction per MD).   2. RD to monitor & follow-up.    Goals: PO intake >50%  Nutrition Goal Status: new  Communication of RD Recs: reviewed with RN    Reason for Assessment    Reason for Assessment: consult  Diagnosis: other (see comments)(Liver fx)  Relevant Medical History: Cirrhosis, Etoh abuse  Interdisciplinary Rounds: attended    General Information Comments: Pt reports improving appetite, consumed 50% of breakfast this AM, trying to drink 1-2 ONS/day. PTA, pt reports poor appetite x2 months along with 30# wt loss. NFPE complete. Moderate malnutrition noted; see PES statement for details. CRRT to be initiated today.  Nutrition Discharge Planning: Adequate PO intake    Nutrition/Diet History    Patient Reported Diet/Restrictions/Preferences: general  Do you have any cultural, spiritual, Tenriism conflicts, given your current situation?: none  Factors Affecting Nutritional Intake: decreased appetite, early satiety    Anthropometrics    Temp: 97.9 °F (36.6 °C)  Height Method: Stated  Height: 5' 10" (177.8 cm)  Height (inches): 70 in  Weight Method: Bed Scale  Weight: 82.6 kg (182 lb 1.6 oz)  Weight (lb): 182.1 lb  Ideal Body Weight (IBW), Male: 166 lb  % Ideal Body Weight, Male (lb): 109.7 lb  BMI (Calculated): 26.2  BMI Grade: 25 - 29.9 - overweight  Usual Body Weight (UBW), k.5 kg  % Usual Body Weight: 86.67  % Weight Change From Usual Weight: -13.51 %    Lab/Procedures/Meds    Pertinent Labs Reviewed: reviewed  Pertinent Labs Comments: BUN 65, Creat 5.6, GFR 10.7, Gluc 134, P 5.4, Bili 37.9  Pertinent Medications Reviewed: reviewed  Pertinent Medications Comments: MVI    Physical Findings/Assessment    Overall Physical Appearance: loss of muscle mass, loss of subcutaneous fat  Oral/Mouth Cavity: WDL  Skin: jaundice, " edema    Estimated/Assessed Needs    Weight Used For Calorie Calculations: 83.7 kg (184 lb 8.4 oz)(Dosing wt)     Energy Calorie Requirements (kcal): 2110 kcal/d  Energy Need Method: [place]-St Jeor(1.25 PAL)     Protein Requirements: 109 g/d (1.3 g/kg)  Weight Used For Protein Calculations: 83.7 kg (184 lb 8.4 oz)     Fluid Need Method: other (see comments)(Per MD or 1 mL/kcal)    Nutrition Prescription Ordered    Current Diet Order: Renal, High calorie/protein - 800 mL FR  Oral Nutrition Supplement: Novasource ONS    Evaluation of Received Nutrient/Fluid Intake    Comments: LBM: 10/31    Tolerance: tolerating    Nutrition Risk    Level of Risk/Frequency of Follow-up: (1x/week)     Assessment and Plan    Moderate protein malnutrition    Malnutrition in the context of Acute Illness/Injury    Related to (etiology):  Cirrhosis, poor appetite    Signs and Symptoms (as evidenced by):  Energy Intake: <75% of estimated energy requirement for 2 months  Body Fat Depletion: moderate depletion of orbitals and triceps   Muscle Mass Depletion: moderate depletion of temples, clavicle region and scapular region   Weight Loss: 14% x 2 months    Nutrition Diagnosis Status:  New      Monitor and Evaluation    Food and Nutrient Intake: energy intake, food and beverage intake  Food and Nutrient Adminstration: diet order  Physical Activity and Function: nutrition-related ADLs and IADLs  Anthropometric Measurements: weight, weight change  Biochemical Data, Medical Tests and Procedures: lipid profile, inflammatory profile, glucose/endocrine profile, gastrointestinal profile, electrolyte and renal panel  Nutrition-Focused Physical Findings: overall appearance     Nutrition Follow-Up    RD Follow-up?: Yes

## 2018-10-31 NOTE — ASSESSMENT & PLAN NOTE
Malnutrition in the context of Acute Illness/Injury    Related to (etiology):  Cirrhosis, poor appetite    Signs and Symptoms (as evidenced by):  Energy Intake: <75% of estimated energy requirement for 2 months  Body Fat Depletion: moderate depletion of orbitals and triceps   Muscle Mass Depletion: moderate depletion of temples, clavicle region and scapular region   Weight Loss: 14% x 2 months    Nutrition Diagnosis Status:  Continues

## 2018-10-31 NOTE — SUBJECTIVE & OBJECTIVE
Interval History:   Thoracentesis done with pig tail catheter placed drained 1450 ml overnight UOP 50 cc. Family at bedside with improving MS slowly. He is hemodynamically stable Off pressors. Oxygenation improved now on RA with pleural effusion drained. Fluid balance Net neg 1000 ml overnight. BB remains elevted at 37.9 mg/dL.    Review of patient's allergies indicates:   Allergen Reactions    Penicillins Nausea And Vomiting and Rash     Current Facility-Administered Medications   Medication Frequency    0.9%  NaCl infusion (for blood administration) Q24H PRN    0.9%  NaCl infusion PRN    0.9%  NaCl infusion Once    acetaminophen tablet 650 mg Q4H PRN    albumin human 5% bottle 25 g Q6H    albuterol-ipratropium 2.5 mg-0.5 mg/3 mL nebulizer solution 3 mL Q6H WAKE    ceFEPIme injection 1 g Q24H    dextrose 50% injection 12.5 g PRN    dextrose 50% injection 25 g PRN    folic acid tablet 1 mg Daily    glucagon (human recombinant) injection 1 mg PRN    glucose chewable tablet 16 g PRN    glucose chewable tablet 24 g PRN    heparin (porcine) injection 5,000 Units Q12H    insulin aspart U-100 pen 0-5 Units QID (AC + HS) PRN    lactulose 20 gram/30 mL solution Soln 30 g BID    lidocaine 5 % patch 1 patch Q24H    lidocaine 5 % patch 1 patch Q24H    midodrine tablet 10 mg TID    multivitamin tablet 1 tablet Daily    ondansetron disintegrating tablet 8 mg Q6H PRN    pantoprazole EC tablet 40 mg Daily    promethazine (PHENERGAN) 12.5 mg in dextrose 5 % 50 mL IVPB Q6H PRN    rifAXIMin tablet 550 mg BID    sevelamer carbonate tablet 800 mg TID WM    sodium bicarbonate tablet 1,300 mg TID    sodium chloride 0.9% flush 5 mL PRN    thiamine tablet 100 mg Daily    traMADol tablet 50 mg Q8H PRN       Objective:     Vital Signs (Most Recent):  Temp: 97.9 °F (36.6 °C) (10/31/18 0300)  Pulse: 88 (10/31/18 1344)  Resp: (!) 24 (10/31/18 1344)  BP: 105/62 (10/31/18 0600)  SpO2: (!) 94 % (10/31/18 1344)  O2  Device (Oxygen Therapy): room air (10/31/18 1344) Vital Signs (24h Range):  Temp:  [97.6 °F (36.4 °C)-97.9 °F (36.6 °C)] 97.9 °F (36.6 °C)  Pulse:  [72-88] 88  Resp:  [19-33] 24  SpO2:  [92 %-98 %] 94 %  BP: ()/(54-63) 105/62     Weight: 82.6 kg (182 lb 1.6 oz) (10/31/18 1300)  Body mass index is 26.13 kg/m².  Body surface area is 2.02 meters squared.    I/O last 3 completed shifts:  In: 1035 [P.O.:920; I.V.:115]  Out: 2889 [Urine:84; Other:955; Stool:400; Chest Tube:1450]    Physical Exam   Constitutional: He appears well-developed. He appears cachectic. He is cooperative. No distress. Nasal cannula in place.   Temporal musc wasting   HENT:   Head: Normocephalic and atraumatic.   Eyes: Conjunctivae are normal. Pupils are equal, round, and reactive to light.   Neck: Trachea normal and normal range of motion. Neck supple. No JVD present.   Cardiovascular: Normal rate, regular rhythm, S1 normal, S2 normal and normal pulses. Exam reveals no gallop and no friction rub.   No murmur heard.  Pulmonary/Chest: Effort normal. He has decreased breath sounds in the right upper field, the right middle field, the right lower field and the left lower field. He has rhonchi in the left lower field.   Abdominal: Soft. Bowel sounds are normal. He exhibits distension and ascites. There is no tenderness.   Musculoskeletal: Normal range of motion. He exhibits no edema.   Neurological: He is alert.   Skin: Skin is warm and dry. Capillary refill takes less than 2 seconds.   Psychiatric: He has a normal mood and affect. His behavior is normal.   Vitals reviewed.      Significant Labs:  ABGs: No results for input(s): PH, PCO2, HCO3, POCSATURATED, BE in the last 168 hours.  BMP:   Recent Labs   Lab 10/31/18  0358   *  134*     103   CO2 25  25   BUN 65*  65*   CREATININE 5.6*  5.6*   CALCIUM 7.6*  7.6*   MG 2.4  2.4     Cardiac Markers: No results for input(s): CKMB, TROPONINT, MYOGLOBIN in the last 168 hours.  CBC:    Recent Labs   Lab 10/31/18  0358   WBC 11.07   RBC 3.44*   HGB 11.5*   HCT 32.8*   PLT 61*   MCV 95   MCH 33.4*   MCHC 35.1     CMP:   Recent Labs   Lab 10/31/18  0358   *  134*   CALCIUM 7.6*  7.6*   ALBUMIN 2.2*  2.2*   PROT 5.1*     138   K 3.6  3.6   CO2 25  25     103   BUN 65*  65*   CREATININE 5.6*  5.6*   ALKPHOS 208*   ALT 55*   AST 80*   BILITOT 37.9*     Coagulation:   Recent Labs   Lab 10/31/18  0358   INR 2.3*     Recent Labs   Lab 10/27/18  1640 10/27/18  1642   COLORU Jackelyn  --    SPECGRAV 1.010  --    PHUR 5.0  --    PROTEINUA Negative  --    BACTERIA  --  Many*   NITRITE Negative  --    LEUKOCYTESUR 1+*  --    HYALINECASTS  --  1     All labs within the past 24 hours have been reviewed.     Significant Imaging:  Labs: Reviewed  US: Reviewed

## 2018-11-01 ENCOUNTER — TELEPHONE (OUTPATIENT)
Dept: TRANSPLANT | Facility: HOSPITAL | Age: 53
End: 2018-11-01

## 2018-11-01 LAB
ALBUMIN SERPL BCP-MCNC: 2.5 G/DL
ALBUMIN SERPL BCP-MCNC: 2.5 G/DL
ALBUMIN SERPL BCP-MCNC: 2.6 G/DL
ALP SERPL-CCNC: 174 U/L
ALT SERPL W/O P-5'-P-CCNC: 41 U/L
ANION GAP SERPL CALC-SCNC: 11 MMOL/L
ANION GAP SERPL CALC-SCNC: 9 MMOL/L
ANION GAP SERPL CALC-SCNC: 9 MMOL/L
AST SERPL-CCNC: 60 U/L
BACTERIA BLD CULT: NORMAL
BACTERIA BLD CULT: NORMAL
BASOPHILS # BLD AUTO: 0.08 K/UL
BASOPHILS NFR BLD: 0.8 %
BILIRUB SERPL-MCNC: 36.2 MG/DL
BUN SERPL-MCNC: 44 MG/DL
BUN SERPL-MCNC: 44 MG/DL
BUN SERPL-MCNC: 50 MG/DL
CALCIUM SERPL-MCNC: 7.9 MG/DL
CALCIUM SERPL-MCNC: 8 MG/DL
CALCIUM SERPL-MCNC: 8 MG/DL
CHLORIDE SERPL-SCNC: 103 MMOL/L
CHLORIDE SERPL-SCNC: 105 MMOL/L
CHLORIDE SERPL-SCNC: 105 MMOL/L
CO2 SERPL-SCNC: 21 MMOL/L
CO2 SERPL-SCNC: 23 MMOL/L
CO2 SERPL-SCNC: 23 MMOL/L
CREAT SERPL-MCNC: 4.5 MG/DL
CREAT SERPL-MCNC: 4.5 MG/DL
CREAT SERPL-MCNC: 5.4 MG/DL
DIFFERENTIAL METHOD: ABNORMAL
EOSINOPHIL # BLD AUTO: 0.7 K/UL
EOSINOPHIL NFR BLD: 6.7 %
ERYTHROCYTE [DISTWIDTH] IN BLOOD BY AUTOMATED COUNT: 19.9 %
EST. GFR  (AFRICAN AMERICAN): 12.9 ML/MIN/1.73 M^2
EST. GFR  (AFRICAN AMERICAN): 16 ML/MIN/1.73 M^2
EST. GFR  (AFRICAN AMERICAN): 16 ML/MIN/1.73 M^2
EST. GFR  (NON AFRICAN AMERICAN): 11.1 ML/MIN/1.73 M^2
EST. GFR  (NON AFRICAN AMERICAN): 13.9 ML/MIN/1.73 M^2
EST. GFR  (NON AFRICAN AMERICAN): 13.9 ML/MIN/1.73 M^2
GLUCOSE SERPL-MCNC: 111 MG/DL
GLUCOSE SERPL-MCNC: 111 MG/DL
GLUCOSE SERPL-MCNC: 169 MG/DL
HCT VFR BLD AUTO: 30.6 %
HGB BLD-MCNC: 10.6 G/DL
IMM GRANULOCYTES # BLD AUTO: 0.14 K/UL
IMM GRANULOCYTES NFR BLD AUTO: 1.4 %
INR PPP: 2.3
LYMPHOCYTES # BLD AUTO: 1.1 K/UL
LYMPHOCYTES NFR BLD: 11.1 %
MAGNESIUM SERPL-MCNC: 2 MG/DL
MAGNESIUM SERPL-MCNC: 2 MG/DL
MAGNESIUM SERPL-MCNC: 2.2 MG/DL
MCH RBC QN AUTO: 33.4 PG
MCHC RBC AUTO-ENTMCNC: 34.6 G/DL
MCV RBC AUTO: 97 FL
MONOCYTES # BLD AUTO: 1.3 K/UL
MONOCYTES NFR BLD: 13.1 %
NEUTROPHILS # BLD AUTO: 6.5 K/UL
NEUTROPHILS NFR BLD: 66.9 %
NRBC BLD-RTO: 0 /100 WBC
PHOSPHATE SERPL-MCNC: 3.9 MG/DL
PHOSPHATE SERPL-MCNC: 3.9 MG/DL
PHOSPHATE SERPL-MCNC: 4.3 MG/DL
PLATELET # BLD AUTO: 45 K/UL
PMV BLD AUTO: 11.6 FL
POCT GLUCOSE: 132 MG/DL (ref 70–110)
POCT GLUCOSE: 135 MG/DL (ref 70–110)
POCT GLUCOSE: 142 MG/DL (ref 70–110)
POTASSIUM SERPL-SCNC: 3.9 MMOL/L
POTASSIUM SERPL-SCNC: 3.9 MMOL/L
POTASSIUM SERPL-SCNC: 4.1 MMOL/L
PROT SERPL-MCNC: 4.9 G/DL
PROTHROMBIN TIME: 22.5 SEC
RBC # BLD AUTO: 3.17 M/UL
SODIUM SERPL-SCNC: 135 MMOL/L
SODIUM SERPL-SCNC: 137 MMOL/L
SODIUM SERPL-SCNC: 137 MMOL/L
WBC # BLD AUTO: 9.77 K/UL

## 2018-11-01 PROCEDURE — 25000003 PHARM REV CODE 250: Mod: NTX | Performed by: HOSPITALIST

## 2018-11-01 PROCEDURE — 25000242 PHARM REV CODE 250 ALT 637 W/ HCPCS: Mod: NTX | Performed by: HOSPITALIST

## 2018-11-01 PROCEDURE — 94761 N-INVAS EAR/PLS OXIMETRY MLT: CPT | Mod: NTX

## 2018-11-01 PROCEDURE — 36415 COLL VENOUS BLD VENIPUNCTURE: CPT | Mod: NTX

## 2018-11-01 PROCEDURE — 85025 COMPLETE CBC W/AUTO DIFF WBC: CPT | Mod: NTX

## 2018-11-01 PROCEDURE — 25000003 PHARM REV CODE 250: Mod: NTX | Performed by: INTERNAL MEDICINE

## 2018-11-01 PROCEDURE — 25000003 PHARM REV CODE 250: Mod: NTX | Performed by: PHYSICIAN ASSISTANT

## 2018-11-01 PROCEDURE — 80053 COMPREHEN METABOLIC PANEL: CPT | Mod: NTX

## 2018-11-01 PROCEDURE — 63600175 PHARM REV CODE 636 W HCPCS: Mod: NTX | Performed by: STUDENT IN AN ORGANIZED HEALTH CARE EDUCATION/TRAINING PROGRAM

## 2018-11-01 PROCEDURE — 27000221 HC OXYGEN, UP TO 24 HOURS: Mod: NTX

## 2018-11-01 PROCEDURE — 99233 PR SUBSEQUENT HOSPITAL CARE,LEVL III: ICD-10-PCS | Mod: NTX,,, | Performed by: INTERNAL MEDICINE

## 2018-11-01 PROCEDURE — 86580 TB INTRADERMAL TEST: CPT | Mod: NTX | Performed by: NURSE PRACTITIONER

## 2018-11-01 PROCEDURE — 99233 SBSQ HOSP IP/OBS HIGH 50: CPT | Mod: NTX,,, | Performed by: INTERNAL MEDICINE

## 2018-11-01 PROCEDURE — 25000003 PHARM REV CODE 250: Mod: NTX | Performed by: STUDENT IN AN ORGANIZED HEALTH CARE EDUCATION/TRAINING PROGRAM

## 2018-11-01 PROCEDURE — 84100 ASSAY OF PHOSPHORUS: CPT | Mod: NTX

## 2018-11-01 PROCEDURE — 94640 AIRWAY INHALATION TREATMENT: CPT | Mod: NTX

## 2018-11-01 PROCEDURE — 80069 RENAL FUNCTION PANEL: CPT | Mod: NTX

## 2018-11-01 PROCEDURE — 83735 ASSAY OF MAGNESIUM: CPT | Mod: NTX

## 2018-11-01 PROCEDURE — 63600175 PHARM REV CODE 636 W HCPCS: Mod: NTX | Performed by: NURSE PRACTITIONER

## 2018-11-01 PROCEDURE — 20600001 HC STEP DOWN PRIVATE ROOM: Mod: NTX

## 2018-11-01 PROCEDURE — P9045 ALBUMIN (HUMAN), 5%, 250 ML: HCPCS | Mod: JG,NTX | Performed by: INTERNAL MEDICINE

## 2018-11-01 PROCEDURE — 83735 ASSAY OF MAGNESIUM: CPT | Mod: 91,NTX

## 2018-11-01 PROCEDURE — 63600175 PHARM REV CODE 636 W HCPCS: Mod: JG,NTX | Performed by: INTERNAL MEDICINE

## 2018-11-01 PROCEDURE — 85610 PROTHROMBIN TIME: CPT | Mod: NTX

## 2018-11-01 PROCEDURE — 86022 PLATELET ANTIBODIES: CPT | Mod: NTX

## 2018-11-01 RX ORDER — MIDODRINE HYDROCHLORIDE 5 MG/1
10 TABLET ORAL EVERY 6 HOURS
Status: DISCONTINUED | OUTPATIENT
Start: 2018-11-01 | End: 2018-11-02

## 2018-11-01 RX ORDER — RAMELTEON 8 MG/1
8 TABLET ORAL NIGHTLY PRN
Status: DISCONTINUED | OUTPATIENT
Start: 2018-11-01 | End: 2018-11-11

## 2018-11-01 RX ORDER — LACTULOSE 10 G/15ML
20 SOLUTION ORAL 2 TIMES DAILY
Status: DISCONTINUED | OUTPATIENT
Start: 2018-11-01 | End: 2018-11-02

## 2018-11-01 RX ADMIN — MIDODRINE HYDROCHLORIDE 10 MG: 5 TABLET ORAL at 05:11

## 2018-11-01 RX ADMIN — IPRATROPIUM BROMIDE AND ALBUTEROL SULFATE 3 ML: .5; 3 SOLUTION RESPIRATORY (INHALATION) at 09:11

## 2018-11-01 RX ADMIN — MIDODRINE HYDROCHLORIDE 10 MG: 5 TABLET ORAL at 11:11

## 2018-11-01 RX ADMIN — MIDODRINE HYDROCHLORIDE 10 MG: 5 TABLET ORAL at 09:11

## 2018-11-01 RX ADMIN — LIDOCAINE 1 PATCH: 50 PATCH CUTANEOUS at 04:11

## 2018-11-01 RX ADMIN — SEVELAMER CARBONATE 800 MG: 800 TABLET, FILM COATED ORAL at 01:11

## 2018-11-01 RX ADMIN — SODIUM BICARBONATE 650 MG TABLET 1300 MG: at 04:11

## 2018-11-01 RX ADMIN — CEFEPIME 1 G: 1 INJECTION, POWDER, FOR SOLUTION INTRAMUSCULAR; INTRAVENOUS at 09:11

## 2018-11-01 RX ADMIN — TUBERCULIN PURIFIED PROTEIN DERIVATIVE 5 UNITS: 5 INJECTION, SOLUTION INTRADERMAL at 04:11

## 2018-11-01 RX ADMIN — LACTULOSE 20 G: 20 SOLUTION ORAL at 09:11

## 2018-11-01 RX ADMIN — LIDOCAINE 1 PATCH: 50 PATCH CUTANEOUS at 09:11

## 2018-11-01 RX ADMIN — PANTOPRAZOLE SODIUM 40 MG: 40 TABLET, DELAYED RELEASE ORAL at 09:11

## 2018-11-01 RX ADMIN — Medication 100 MG: at 09:11

## 2018-11-01 RX ADMIN — SEVELAMER CARBONATE 800 MG: 800 TABLET, FILM COATED ORAL at 08:11

## 2018-11-01 RX ADMIN — SODIUM BICARBONATE 650 MG TABLET 1300 MG: at 09:11

## 2018-11-01 RX ADMIN — IPRATROPIUM BROMIDE AND ALBUTEROL SULFATE 3 ML: .5; 3 SOLUTION RESPIRATORY (INHALATION) at 01:11

## 2018-11-01 RX ADMIN — IPRATROPIUM BROMIDE AND ALBUTEROL SULFATE 3 ML: .5; 3 SOLUTION RESPIRATORY (INHALATION) at 07:11

## 2018-11-01 RX ADMIN — ALBUMIN HUMAN 25 G: 0.05 INJECTION, SOLUTION INTRAVENOUS at 06:11

## 2018-11-01 RX ADMIN — RAMELTEON 8 MG: 8 TABLET, FILM COATED ORAL at 09:11

## 2018-11-01 RX ADMIN — THERA TABS 1 TABLET: TAB at 09:11

## 2018-11-01 RX ADMIN — RIFAXIMIN 550 MG: 550 TABLET ORAL at 09:11

## 2018-11-01 RX ADMIN — ALBUMIN HUMAN 25 G: 0.05 INJECTION, SOLUTION INTRAVENOUS at 12:11

## 2018-11-01 RX ADMIN — SEVELAMER CARBONATE 800 MG: 800 TABLET, FILM COATED ORAL at 04:11

## 2018-11-01 RX ADMIN — FOLIC ACID 1 MG: 1 TABLET ORAL at 09:11

## 2018-11-01 NOTE — TELEPHONE ENCOUNTER
(VITALIY) attempted to contact the patient (pt) via cell phone: [phone] and was able to leave a v/m asking to be contacted in an effort to schedule an appointment time to complete an inpatient evaluation on tomorrow. VITALIY later received a returned call from the pt's wife, Kavita, advising she would be available along with the pt at anytime during tomorrow.  VITALIY advised she would visit the pt and his wife on tomorrow at 11:30 a.m.  Pt's wife advised that time would be appropriate. SW remains available.

## 2018-11-01 NOTE — PLAN OF CARE
Problem: Patient Care Overview  Goal: Plan of Care Review  Outcome: Ongoing (interventions implemented as appropriate)   See vital signs and assessments in flowsheets. See below for updates on today's progress.     Pulmonary: on 1L O2 via nasal cannula with SpO2 of >94%; can be breathless at times on moderate exertion.    Cardiovascular: NSR; SBP on 90's; MAP >65; CRT < 3 secs. Lab results: Hb, Hct and K+ relayed to Dr Cano--no new orders.    Neurological: AAOx4; PERRLA    Gastrointestinal: bowels opened x3; normoactive bowel sounds    Genitourinary: anuric; had HD trial last night for 3 hours    Endocrine: BG monitoring--within normal range    Integumentary/Other: generally jaundice    Infusions: none    Patient progressing towards goals as tolerated, plan of care communicated and reviewed with Femi Enciso and family. All concerns addressed. Will continue to monitor.

## 2018-11-01 NOTE — RESIDENT HANDOFF
Brief HPI:   HPI:  52 y/o man with DM2, recent diagnosis of liver failure likely secondary to EtOH abuse in 09/2018, DEL progressing to ESRD admitted to the medicine team for transplant evaluation.   Of note, he was previous in fair health until in September, N/V and diarrhea after sanwitch, resolved, then found have increasing jaundice. Went to hospital, found to have ALI, while he also got hypoxic, CT chest showed large pleural effusion, he was admitted, underwent thoracentesis(removed about 1L), cell study negative for malignancy, and he was treated for PNA and C.diff as well. However, he continue to have weight loss, and start to have AMS, readmitted to hospital, found to have DEL despite ALI. So he was transferred here for eval of liver tx.  Last drink was 9/21/2018.   Denies any fever or chills or chest pain or abdominal pain.     ICU course:   Placed R IJ dialysis line-->CRRT--> HD 10/31 night for 3hrs, tolerated well  R chest tube placed 10/30-> drained about 1.5L per day ->10/31 DC chest tube, on room air             Outstanding Items:   UTI--plan for 7days of cefepime  Thrombocytopenia, PLT 45 k today-> held SQ, HIT sent  Hepatology for transplant eval    Overnight Events:   None    HM to follow up:  Pleural fluid analysis and culture  Finish cefepime course  F/u HIT  F/u Hepatology      Verbal sign out also given to Dr. Leung

## 2018-11-01 NOTE — ASSESSMENT & PLAN NOTE
- DEL, Uremia,    Tolerated 3hours of HD last night, no fluid removal, HDS   Increase midodrine to 10mg QID   - Avoid nephrotoxic medications  - Renally adjust medications

## 2018-11-01 NOTE — ASSESSMENT & PLAN NOTE
MELD-Na score: 43 at 11/1/2018  4:00 AM  MELD score: 43 at 11/1/2018  4:00 AM  Calculated from:  Serum Creatinine: 4.5 mg/dL (Rounded to 4 mg/dL) at 11/1/2018  4:00 AM  Serum Sodium: 137 mmol/L at 11/1/2018  4:00 AM  Total Bilirubin: 36.2 mg/dL at 11/1/2018  4:00 AM  INR(ratio): 2.3 at 11/1/2018  4:00 AM  Age: 53 years    - Continue albumin, midodrine, lactulose and rifaximin   Had>5BM overnight, decrease lactulose dosing to target BM 3-5 per day , decrease to 20g BID today  - Hepatology consulted, appreciate evaluation.

## 2018-11-01 NOTE — ASSESSMENT & PLAN NOTE
- Continue midodrine, albumin   Increase midodrine to 10mg QID today as BP labile  - Maintain MAP>65  Dialysis per nephrology

## 2018-11-01 NOTE — NURSING
Pt arrived to floor and oriented to room at this time. VSS. Pt resting at this time with wife at bedside, cont to monitor.

## 2018-11-01 NOTE — ASSESSMENT & PLAN NOTE
- Likely due to liver dz  - no sign of bleeding  - Continue to monitor, PLT 45 today, no sign of bleeding,    Hold SQH, send HIT panel today

## 2018-11-01 NOTE — PROGRESS NOTES
[facility]  Critical Care Medicine  Progress Note    Patient Name: Femi Enciso  MRN: [id number]  Admission Date: 10/27/2018  Hospital Length of Stay: 5 days  Code Status: Full Code  Attending Provider: Tk Carmona MD  Primary Care Provider: Primary Doctor No   Principal Problem: Acute liver failure without hepatic coma    Subjective:     HPI:  52 y/o man with DM2, recent diagnosis of liver failure likely secondary to EtOH abuse in 09/2018, DEL progressing to ESRD admitted to the medicine team for transplant evaluation.   Of note, he was previous in fair health until in September, N/V and diarrhea after sanwitch, resolved, then found have increasing jaundice. Went to hospital, found to have ALI, while he also got hypoxic, CT chest showed large pleural effusion, he was admitted, underwent thoracentesis(removed about 1L), cell study negative for malignancy, and he was treated for PNA and C.diff as well. However, he continue to have weight loss, and start to have AMS, readmitted to hospital, found to have DEL despite ALI. So he was transferred here for eval of liver tx.  Last drink was 9/21/2018.   Denies any fever or chills or chest pain or abdominal pain.           Hospital/ICU Course:  No notes on file    Interval History/Significant Events: no acute issues overnight, tolerated 3hrs of HD(no fluid removal), chest tube removed, no signs of respiratory distress.     Review of Systems  Objective:     Vital Signs (Most Recent):  Temp: 98.1 °F (36.7 °C) (11/01/18 0300)  Pulse: 91 (11/01/18 0740)  Resp: (!) 38 (11/01/18 0740)  BP: 107/61 (11/01/18 0705)  SpO2: 96 % (11/01/18 0740) Vital Signs (24h Range):  Temp:  [97.7 °F (36.5 °C)-98.1 °F (36.7 °C)] 98.1 °F (36.7 °C)  Pulse:  [75-91] 91  Resp:  [17-38] 38  SpO2:  [94 %-97 %] 96 %  BP: ()/(54-61) 107/61   Weight: 81.5 kg (179 lb 10.8 oz)  Body mass index is 25.78 kg/m².      Intake/Output Summary (Last 24 hours) at 11/1/2018 0810  Last data  filed at 11/1/2018 0600  Gross per 24 hour   Intake 1720 ml   Output 660 ml   Net 1060 ml       Physical Exam   Constitutional:   Thin, jaundice improved significantly   HENT:   Mouth/Throat: No oropharyngeal exudate.   Eyes: Scleral icterus is present.   Cardiovascular: Normal rate, regular rhythm and normal heart sounds.   Pulmonary/Chest: Effort normal.   Reduced breath sounds and dullness to percussion to right lung fields.   R side chest s/p CT removal, improved aeration on auscultation. There is mild tenderness around the rib cage;   Abdominal: He exhibits distension. There is no tenderness. There is no rebound and no guarding.   Musculoskeletal: He exhibits no edema.   Skin: Skin is warm and dry.   Improved jaundiced   Psychiatric: He has a normal mood and affect. His behavior is normal.   Nursing note and vitals reviewed.      Vents:  Oxygen Concentration (%): 28 (10/30/18 2116)  Lines/Drains/Airways     Central Venous Catheter Line                 Percutaneous Central Line Insertion/Assessment - triple lumen  10/28/18 1740 right internal jugular 3 days          Peripheral Intravenous Line                 Peripheral IV - Single Lumen 10/27/18 1456 Right Antecubital 4 days         Peripheral IV - Single Lumen 10/27/18 1600 Left Hand 4 days              Significant Labs:    CBC/Anemia Profile:  Recent Labs   Lab 10/31/18  0358 11/01/18  0400   WBC 11.07 9.77   HGB 11.5* 10.6*   HCT 32.8* 30.6*   PLT 61* 45*   MCV 95 97   RDW 19.7* 19.9*        Chemistries:  Recent Labs   Lab 10/31/18  0358 10/31/18  2200 11/01/18  0400     138 137 137  137   K 3.6  3.6 3.9 3.9  3.9     103 103 105  105   CO2 25  25 23 23  23   BUN 65*  65* 64* 44*  44*   CREATININE 5.6*  5.6* 5.6* 4.5*  4.5*   CALCIUM 7.6*  7.6* 8.0* 8.0*  8.0*   ALBUMIN 2.2*  2.2* 2.4* 2.5*  2.5*   PROT 5.1*  --  4.9*   BILITOT 37.9*  --  36.2*   ALKPHOS 208*  --  174*   ALT 55*  --  41   AST 80*  --  60*   MG 2.4  2.4 2.3 2.0   2.0   PHOS 5.4*  5.4* 4.8* 3.9  3.9           Significant Imaging:  I have reviewed all pertinent imaging results/findings within the past 24 hours.    Assessment/Plan:     Psychiatric   Severe alcohol dependence    - Last drink on 09/21/2018  - Outside window for severe withdrawal     Renal/   Acute cystitis without hematuria    Following urine cultures---Enterobacter   Cefepime for total of 7 day course     DEL (acute kidney injury)    - DEL, Uremia,    Tolerated 3hours of HD last night, no fluid removal, HDS   Increase midodrine to 10mg QID   - Avoid nephrotoxic medications  - Renally adjust medications     Hematology   Thrombocytopenia    - Likely due to liver dz  - no sign of bleeding  - Continue to monitor, PLT 45 today, no sign of bleeding,    Hold SQH, send HIT panel today     Coagulopathy    - INR 2.3 on 10/28 upon admission to ICU   No signs of bleeding   Vitamin K and given 2 doses of FFP. No procedures upcoming, will hold further FFP unless clinical situation changes.      Oncology   Anemia of chronic disease    - h/h stable , continue to monitor     Endocrine   Type 2 diabetes mellitus without complication    - SSI  - Hold home metformin     GI   * Acute liver failure without hepatic coma    MELD-Na score: 43 at 11/1/2018  4:00 AM  MELD score: 43 at 11/1/2018  4:00 AM  Calculated from:  Serum Creatinine: 4.5 mg/dL (Rounded to 4 mg/dL) at 11/1/2018  4:00 AM  Serum Sodium: 137 mmol/L at 11/1/2018  4:00 AM  Total Bilirubin: 36.2 mg/dL at 11/1/2018  4:00 AM  INR(ratio): 2.3 at 11/1/2018  4:00 AM  Age: 53 years    - Continue albumin, midodrine, lactulose and rifaximin   Had>5BM overnight, decrease lactulose dosing to target BM 3-5 per day , decrease to 20g BID today  - Hepatology consulted, appreciate evaluation.        Hepatic encephalopathy    - Continue lactulose and rifaximine  - titrate to 3-5 BMs daily     Pleural effusion associated with hepatic disorder    - Likely hepatothorax.    S/p R CT  placement 10/30, removed on 10/31;    Continue to monitor clinically   F/u pleural fluid analysis     Hepatorenal syndrome    - Continue midodrine, albumin   Increase midodrine to 10mg QID today as BP labile  - Maintain MAP>65  Dialysis per nephrology     Decompensated hepatic cirrhosis    - see above        Critical Care Daily Checklist:    A: Awake: RASS Goal/Actual Goal:    Actual: [place] Agitation Sedation Scale (RASS): Alert and calm   B: Spontaneous Breathing Trial Performed?     C: SAT & SBT Coordinated?  na                      D: Delirium: CAM-ICU Overall CAM-ICU: Negative   E: Early Mobility Performed? Yes   F: Feeding Goal: Goals: PO intake >50%  Status: Nutrition Goal Status: new   Current Diet Order   Procedures    Diet renal [facility]; High Protein/High Calorie; Fluid - 800mL     Order Specific Question:   Indicate patient location for additional diet options:     Answer:   [facility]     Order Specific Question:   Additional Diet Options:     Answer:   High Protein/High Calorie     Order Specific Question:   Fluid restriction:     Answer:   Fluid - 800mL      AS: Analgesia/Sedation Lidocaine patch at the CT site   T: Thromboembolic Prophylaxis Hold for now as PLT dropping   H: HOB > 300 Yes   U: Stress Ulcer Prophylaxis (if needed) na   G: Glucose Control na   B: Bowel Function Stool Occurrence: 1   I: Indwelling Catheter (Lines & Barton) Necessity RIJ dialysis cath   D: De-escalation of Antimicrobials/Pharmacotherapies cefepime    Plan for the day/ETD May step down today    Code Status:  Family/Goals of Care: Full Code            Critical care was time spent personally by me on the following activities: development of treatment plan with patient or surrogate and bedside caregivers, discussions with consultants, evaluation of patient's response to treatment, examination of patient, ordering and performing treatments and interventions, ordering and review of laboratory studies, ordering  and review of radiographic studies, pulse oximetry, re-evaluation of patient's condition. This critical care time did not overlap with that of any other provider or involve time for any procedures.     Thalia Momin MD  Critical Care Medicine  [facility]

## 2018-11-01 NOTE — SUBJECTIVE & OBJECTIVE
Interval History/Significant Events: no acute issues overnight, tolerated 3hrs of HD(no fluid removal), chest tube removed, no signs of respiratory distress.     Review of Systems  Objective:     Vital Signs (Most Recent):  Temp: 98.1 °F (36.7 °C) (11/01/18 0300)  Pulse: 91 (11/01/18 0740)  Resp: (!) 38 (11/01/18 0740)  BP: 107/61 (11/01/18 0705)  SpO2: 96 % (11/01/18 0740) Vital Signs (24h Range):  Temp:  [97.7 °F (36.5 °C)-98.1 °F (36.7 °C)] 98.1 °F (36.7 °C)  Pulse:  [75-91] 91  Resp:  [17-38] 38  SpO2:  [94 %-97 %] 96 %  BP: ()/(54-61) 107/61   Weight: 81.5 kg (179 lb 10.8 oz)  Body mass index is 25.78 kg/m².      Intake/Output Summary (Last 24 hours) at 11/1/2018 0810  Last data filed at 11/1/2018 0600  Gross per 24 hour   Intake 1720 ml   Output 660 ml   Net 1060 ml       Physical Exam   Constitutional:   Thin, jaundice improved significantly   HENT:   Mouth/Throat: No oropharyngeal exudate.   Eyes: Scleral icterus is present.   Cardiovascular: Normal rate, regular rhythm and normal heart sounds.   Pulmonary/Chest: Effort normal.   Reduced breath sounds and dullness to percussion to right lung fields.   R side chest s/p CT removal, improved aeration on auscultation. There is mild tenderness around the rib cage;   Abdominal: He exhibits distension. There is no tenderness. There is no rebound and no guarding.   Musculoskeletal: He exhibits no edema.   Skin: Skin is warm and dry.   Improved jaundiced   Psychiatric: He has a normal mood and affect. His behavior is normal.   Nursing note and vitals reviewed.      Vents:  Oxygen Concentration (%): 28 (10/30/18 2116)  Lines/Drains/Airways     Central Venous Catheter Line                 Percutaneous Central Line Insertion/Assessment - triple lumen  10/28/18 1740 right internal jugular 3 days          Peripheral Intravenous Line                 Peripheral IV - Single Lumen 10/27/18 1456 Right Antecubital 4 days         Peripheral IV - Single Lumen 10/27/18 1600  Left Hand 4 days              Significant Labs:    CBC/Anemia Profile:  Recent Labs   Lab 10/31/18  0358 11/01/18  0400   WBC 11.07 9.77   HGB 11.5* 10.6*   HCT 32.8* 30.6*   PLT 61* 45*   MCV 95 97   RDW 19.7* 19.9*        Chemistries:  Recent Labs   Lab 10/31/18  0358 10/31/18  2200 11/01/18  0400     138 137 137  137   K 3.6  3.6 3.9 3.9  3.9     103 103 105  105   CO2 25  25 23 23  23   BUN 65*  65* 64* 44*  44*   CREATININE 5.6*  5.6* 5.6* 4.5*  4.5*   CALCIUM 7.6*  7.6* 8.0* 8.0*  8.0*   ALBUMIN 2.2*  2.2* 2.4* 2.5*  2.5*   PROT 5.1*  --  4.9*   BILITOT 37.9*  --  36.2*   ALKPHOS 208*  --  174*   ALT 55*  --  41   AST 80*  --  60*   MG 2.4  2.4 2.3 2.0  2.0   PHOS 5.4*  5.4* 4.8* 3.9  3.9           Significant Imaging:  I have reviewed all pertinent imaging results/findings within the past 24 hours.

## 2018-11-01 NOTE — PROGRESS NOTES
3hr HD treatment completed no fluid removed as per order. Arterial chamber sucking down throughout second half of treatment lines flushed several times unable to reverse. Bfr@ 200. Both lumens of a RIJ cvc flushed and luer locs placed.

## 2018-11-02 ENCOUNTER — DOCUMENTATION ONLY (OUTPATIENT)
Dept: TRANSPLANT | Facility: CLINIC | Age: 53
End: 2018-11-02

## 2018-11-02 PROBLEM — N18.9 CHRONIC KIDNEY DISEASE-MINERAL AND BONE DISORDER: Status: RESOLVED | Noted: 2018-11-02 | Resolved: 2018-11-02

## 2018-11-02 PROBLEM — M89.9 CHRONIC KIDNEY DISEASE-MINERAL AND BONE DISORDER: Status: RESOLVED | Noted: 2018-11-02 | Resolved: 2018-11-02

## 2018-11-02 PROBLEM — Z01.818 PRE-TRANSPLANT EVALUATION FOR LIVER TRANSPLANT: Status: ACTIVE | Noted: 2018-11-02

## 2018-11-02 PROBLEM — N18.9 CHRONIC KIDNEY DISEASE-MINERAL AND BONE DISORDER: Status: ACTIVE | Noted: 2018-11-02

## 2018-11-02 PROBLEM — E83.9 CHRONIC KIDNEY DISEASE-MINERAL AND BONE DISORDER: Status: ACTIVE | Noted: 2018-11-02

## 2018-11-02 PROBLEM — M89.9 CHRONIC KIDNEY DISEASE-MINERAL AND BONE DISORDER: Status: ACTIVE | Noted: 2018-11-02

## 2018-11-02 PROBLEM — E83.9 CHRONIC KIDNEY DISEASE-MINERAL AND BONE DISORDER: Status: RESOLVED | Noted: 2018-11-02 | Resolved: 2018-11-02

## 2018-11-02 LAB
ALBUMIN SERPL BCP-MCNC: 2.4 G/DL
ALP SERPL-CCNC: 184 U/L
ALT SERPL W/O P-5'-P-CCNC: 35 U/L
ANION GAP SERPL CALC-SCNC: 10 MMOL/L
ANION GAP SERPL CALC-SCNC: 10 MMOL/L
ANION GAP SERPL CALC-SCNC: 11 MMOL/L
AORTIC VALVE STENOSIS: NORMAL
AST SERPL-CCNC: 63 U/L
BASOPHILS # BLD AUTO: 0.1 K/UL
BASOPHILS NFR BLD: 0.8 %
BILIRUB SERPL-MCNC: 36.9 MG/DL
BUN SERPL-MCNC: 59 MG/DL
BUN SERPL-MCNC: 61 MG/DL
BUN SERPL-MCNC: 61 MG/DL
CALCIUM SERPL-MCNC: 8.1 MG/DL
CALCIUM SERPL-MCNC: 8.1 MG/DL
CALCIUM SERPL-MCNC: 8.4 MG/DL
CHLORIDE SERPL-SCNC: 101 MMOL/L
CHLORIDE SERPL-SCNC: 101 MMOL/L
CHLORIDE SERPL-SCNC: 103 MMOL/L
CO2 SERPL-SCNC: 22 MMOL/L
CO2 SERPL-SCNC: 22 MMOL/L
CO2 SERPL-SCNC: 23 MMOL/L
COMPLEXED PSA SERPL-MCNC: 0.39 NG/ML
CREAT SERPL-MCNC: 6.2 MG/DL
CREAT SERPL-MCNC: 6.8 MG/DL
CREAT SERPL-MCNC: 6.8 MG/DL
DIASTOLIC DYSFUNCTION: NO
DIFFERENTIAL METHOD: ABNORMAL
EOSINOPHIL # BLD AUTO: 0.6 K/UL
EOSINOPHIL NFR BLD: 4.2 %
ERYTHROCYTE [DISTWIDTH] IN BLOOD BY AUTOMATED COUNT: 19.3 %
EST. GFR  (AFRICAN AMERICAN): 10.9 ML/MIN/1.73 M^2
EST. GFR  (AFRICAN AMERICAN): 9.7 ML/MIN/1.73 M^2
EST. GFR  (AFRICAN AMERICAN): 9.7 ML/MIN/1.73 M^2
EST. GFR  (NON AFRICAN AMERICAN): 8.4 ML/MIN/1.73 M^2
EST. GFR  (NON AFRICAN AMERICAN): 8.4 ML/MIN/1.73 M^2
EST. GFR  (NON AFRICAN AMERICAN): 9.4 ML/MIN/1.73 M^2
GLUCOSE SERPL-MCNC: 119 MG/DL
GLUCOSE SERPL-MCNC: 119 MG/DL
GLUCOSE SERPL-MCNC: 128 MG/DL
HCT VFR BLD AUTO: 31.8 %
HEPARIN INDUCED THROMBOCYTOPENIA: NORMAL
HGB BLD-MCNC: 11 G/DL
IMM GRANULOCYTES # BLD AUTO: 0.25 K/UL
IMM GRANULOCYTES NFR BLD AUTO: 1.9 %
INR PPP: 2.2
LYMPHOCYTES # BLD AUTO: 1.2 K/UL
LYMPHOCYTES NFR BLD: 9.2 %
MAGNESIUM SERPL-MCNC: 2.1 MG/DL
MAGNESIUM SERPL-MCNC: 2.2 MG/DL
MAGNESIUM SERPL-MCNC: 2.2 MG/DL
MCH RBC QN AUTO: 33.5 PG
MCHC RBC AUTO-ENTMCNC: 34.6 G/DL
MCV RBC AUTO: 97 FL
MITRAL VALVE MOBILITY: NORMAL
MONOCYTES # BLD AUTO: 1.6 K/UL
MONOCYTES NFR BLD: 12.3 %
NEUTROPHILS # BLD AUTO: 9.5 K/UL
NEUTROPHILS NFR BLD: 71.6 %
NRBC BLD-RTO: 0 /100 WBC
PHOSPHATE SERPL-MCNC: 5 MG/DL
PLATELET # BLD AUTO: 59 K/UL
PMV BLD AUTO: 12.9 FL
POCT GLUCOSE: 115 MG/DL (ref 70–110)
POCT GLUCOSE: 121 MG/DL (ref 70–110)
POCT GLUCOSE: 129 MG/DL (ref 70–110)
POCT GLUCOSE: 146 MG/DL (ref 70–110)
POCT GLUCOSE: 162 MG/DL (ref 70–110)
POTASSIUM SERPL-SCNC: 4.1 MMOL/L
POTASSIUM SERPL-SCNC: 4.1 MMOL/L
POTASSIUM SERPL-SCNC: 4.2 MMOL/L
PROT SERPL-MCNC: 5 G/DL
PROTHROMBIN TIME: 21.1 SEC
RBC # BLD AUTO: 3.28 M/UL
RETIRED EF AND QEF - SEE NOTES: 65 (ref 55–65)
SODIUM SERPL-SCNC: 133 MMOL/L
SODIUM SERPL-SCNC: 133 MMOL/L
SODIUM SERPL-SCNC: 137 MMOL/L
WBC # BLD AUTO: 13.2 K/UL

## 2018-11-02 PROCEDURE — 87205 SMEAR GRAM STAIN: CPT | Mod: NTX

## 2018-11-02 PROCEDURE — 97165 OT EVAL LOW COMPLEX 30 MIN: CPT | Mod: NTX

## 2018-11-02 PROCEDURE — 86592 SYPHILIS TEST NON-TREP QUAL: CPT | Mod: NTX

## 2018-11-02 PROCEDURE — 85025 COMPLETE CBC W/AUTO DIFF WBC: CPT | Mod: NTX

## 2018-11-02 PROCEDURE — 90935 HEMODIALYSIS ONE EVALUATION: CPT | Mod: NTX,,, | Performed by: INTERNAL MEDICINE

## 2018-11-02 PROCEDURE — 86850 RBC ANTIBODY SCREEN: CPT | Mod: NTX

## 2018-11-02 PROCEDURE — 63600175 PHARM REV CODE 636 W HCPCS: Mod: NTX | Performed by: STUDENT IN AN ORGANIZED HEALTH CARE EDUCATION/TRAINING PROGRAM

## 2018-11-02 PROCEDURE — 86790 VIRUS ANTIBODY NOS: CPT | Mod: NTX

## 2018-11-02 PROCEDURE — 84100 ASSAY OF PHOSPHORUS: CPT | Mod: NTX

## 2018-11-02 PROCEDURE — 86706 HEP B SURFACE ANTIBODY: CPT | Mod: NTX

## 2018-11-02 PROCEDURE — 25000003 PHARM REV CODE 250: Mod: NTX | Performed by: HOSPITALIST

## 2018-11-02 PROCEDURE — 99232 PR SUBSEQUENT HOSPITAL CARE,LEVL II: ICD-10-PCS | Mod: NTX,,, | Performed by: INTERNAL MEDICINE

## 2018-11-02 PROCEDURE — 86644 CMV ANTIBODY: CPT | Mod: NTX

## 2018-11-02 PROCEDURE — 99233 SBSQ HOSP IP/OBS HIGH 50: CPT | Mod: NTX,,, | Performed by: HOSPITALIST

## 2018-11-02 PROCEDURE — 80053 COMPREHEN METABOLIC PANEL: CPT | Mod: NTX

## 2018-11-02 PROCEDURE — 25000242 PHARM REV CODE 250 ALT 637 W/ HCPCS: Mod: NTX | Performed by: HOSPITALIST

## 2018-11-02 PROCEDURE — 93351 STRESS TTE COMPLETE: CPT | Mod: 26,NTX,, | Performed by: INTERNAL MEDICINE

## 2018-11-02 PROCEDURE — 94761 N-INVAS EAR/PLS OXIMETRY MLT: CPT | Mod: NTX

## 2018-11-02 PROCEDURE — 90935 PR HEMODIALYSIS, ONE EVALUATION: ICD-10-PCS | Mod: NTX,,, | Performed by: INTERNAL MEDICINE

## 2018-11-02 PROCEDURE — 86704 HEP B CORE ANTIBODY TOTAL: CPT | Mod: NTX

## 2018-11-02 PROCEDURE — 99233 PR SUBSEQUENT HOSPITAL CARE,LEVL III: ICD-10-PCS | Mod: NTX,,, | Performed by: HOSPITALIST

## 2018-11-02 PROCEDURE — 93320 DOPPLER ECHO COMPLETE: CPT | Mod: 26,NTX,, | Performed by: INTERNAL MEDICINE

## 2018-11-02 PROCEDURE — 99499 UNLISTED E&M SERVICE: CPT | Mod: NTX,,, | Performed by: PHYSICIAN ASSISTANT

## 2018-11-02 PROCEDURE — 20600001 HC STEP DOWN PRIVATE ROOM: Mod: NTX

## 2018-11-02 PROCEDURE — 93325 DOBUTAMINE STRESS TEST W/ COLOR FLOW: ICD-10-PCS | Mod: 26,NTX,, | Performed by: INTERNAL MEDICINE

## 2018-11-02 PROCEDURE — 80100014 HC HEMODIALYSIS 1:1: Mod: NTX

## 2018-11-02 PROCEDURE — 86787 VARICELLA-ZOSTER ANTIBODY: CPT | Mod: NTX

## 2018-11-02 PROCEDURE — 86480 TB TEST CELL IMMUN MEASURE: CPT | Mod: NTX

## 2018-11-02 PROCEDURE — 97116 GAIT TRAINING THERAPY: CPT | Mod: NTX

## 2018-11-02 PROCEDURE — 93351 STRESS TTE COMPLETE: CPT | Mod: NTX

## 2018-11-02 PROCEDURE — 87070 CULTURE OTHR SPECIMN AEROBIC: CPT | Mod: NTX

## 2018-11-02 PROCEDURE — 83735 ASSAY OF MAGNESIUM: CPT | Mod: NTX

## 2018-11-02 PROCEDURE — 93325 DOPPLER ECHO COLOR FLOW MAPG: CPT | Mod: 26,NTX,, | Performed by: INTERNAL MEDICINE

## 2018-11-02 PROCEDURE — 93320 DOBUTAMINE STRESS TEST W/ COLOR FLOW: ICD-10-PCS | Mod: 26,NTX,, | Performed by: INTERNAL MEDICINE

## 2018-11-02 PROCEDURE — 97530 THERAPEUTIC ACTIVITIES: CPT | Mod: NTX

## 2018-11-02 PROCEDURE — 99499 NO LOS: ICD-10-PCS | Mod: NTX,,, | Performed by: PHYSICIAN ASSISTANT

## 2018-11-02 PROCEDURE — 87340 HEPATITIS B SURFACE AG IA: CPT | Mod: NTX

## 2018-11-02 PROCEDURE — 25000003 PHARM REV CODE 250: Mod: NTX | Performed by: STUDENT IN AN ORGANIZED HEALTH CARE EDUCATION/TRAINING PROGRAM

## 2018-11-02 PROCEDURE — 86703 HIV-1/HIV-2 1 RESULT ANTBDY: CPT | Mod: NTX

## 2018-11-02 PROCEDURE — 93351 DOBUTAMINE STRESS TEST W/ COLOR FLOW: ICD-10-PCS | Mod: 26,NTX,, | Performed by: INTERNAL MEDICINE

## 2018-11-02 PROCEDURE — 85610 PROTHROMBIN TIME: CPT | Mod: NTX

## 2018-11-02 PROCEDURE — 86803 HEPATITIS C AB TEST: CPT | Mod: NTX

## 2018-11-02 PROCEDURE — 84153 ASSAY OF PSA TOTAL: CPT | Mod: NTX

## 2018-11-02 PROCEDURE — P9047 ALBUMIN (HUMAN), 25%, 50ML: HCPCS | Mod: JG,NTX | Performed by: INTERNAL MEDICINE

## 2018-11-02 PROCEDURE — 25000003 PHARM REV CODE 250: Mod: NTX | Performed by: INTERNAL MEDICINE

## 2018-11-02 PROCEDURE — 99232 SBSQ HOSP IP/OBS MODERATE 35: CPT | Mod: NTX,,, | Performed by: INTERNAL MEDICINE

## 2018-11-02 PROCEDURE — 86682 HELMINTH ANTIBODY: CPT | Mod: NTX

## 2018-11-02 PROCEDURE — 36415 COLL VENOUS BLD VENIPUNCTURE: CPT | Mod: NTX

## 2018-11-02 PROCEDURE — 63600175 PHARM REV CODE 636 W HCPCS: Mod: JG,NTX | Performed by: INTERNAL MEDICINE

## 2018-11-02 PROCEDURE — 94640 AIRWAY INHALATION TREATMENT: CPT | Mod: NTX

## 2018-11-02 RX ORDER — MIDODRINE HYDROCHLORIDE 5 MG/1
15 TABLET ORAL 3 TIMES DAILY
Status: DISCONTINUED | OUTPATIENT
Start: 2018-11-02 | End: 2018-11-11

## 2018-11-02 RX ORDER — LACTULOSE 10 G/15ML
15 SOLUTION ORAL 2 TIMES DAILY
Status: DISCONTINUED | OUTPATIENT
Start: 2018-11-02 | End: 2018-11-05

## 2018-11-02 RX ORDER — SODIUM CHLORIDE 9 MG/ML
INJECTION, SOLUTION INTRAVENOUS ONCE
Status: DISCONTINUED | OUTPATIENT
Start: 2018-11-02 | End: 2018-11-05

## 2018-11-02 RX ORDER — SODIUM CHLORIDE 9 MG/ML
INJECTION, SOLUTION INTRAVENOUS
Status: DISCONTINUED | OUTPATIENT
Start: 2018-11-02 | End: 2018-11-06

## 2018-11-02 RX ORDER — ALBUMIN HUMAN 250 G/1000ML
25 SOLUTION INTRAVENOUS
Status: DISCONTINUED | OUTPATIENT
Start: 2018-11-02 | End: 2018-11-11

## 2018-11-02 RX ADMIN — LIDOCAINE 1 PATCH: 50 PATCH CUTANEOUS at 07:11

## 2018-11-02 RX ADMIN — SODIUM BICARBONATE 650 MG TABLET 1300 MG: at 09:11

## 2018-11-02 RX ADMIN — THERA TABS 1 TABLET: TAB at 09:11

## 2018-11-02 RX ADMIN — ONDANSETRON 8 MG: 8 TABLET, ORALLY DISINTEGRATING ORAL at 10:11

## 2018-11-02 RX ADMIN — Medication 100 MG: at 09:11

## 2018-11-02 RX ADMIN — PANTOPRAZOLE SODIUM 40 MG: 40 TABLET, DELAYED RELEASE ORAL at 09:11

## 2018-11-02 RX ADMIN — IPRATROPIUM BROMIDE AND ALBUTEROL SULFATE 3 ML: .5; 3 SOLUTION RESPIRATORY (INHALATION) at 08:11

## 2018-11-02 RX ADMIN — RIFAXIMIN 550 MG: 550 TABLET ORAL at 09:11

## 2018-11-02 RX ADMIN — CEFEPIME 1 G: 1 INJECTION, POWDER, FOR SOLUTION INTRAMUSCULAR; INTRAVENOUS at 09:11

## 2018-11-02 RX ADMIN — SEVELAMER CARBONATE 800 MG: 800 TABLET, FILM COATED ORAL at 05:11

## 2018-11-02 RX ADMIN — LACTULOSE 15 G: 20 SOLUTION ORAL at 09:11

## 2018-11-02 RX ADMIN — SODIUM BICARBONATE 650 MG TABLET 1300 MG: at 03:11

## 2018-11-02 RX ADMIN — SEVELAMER CARBONATE 800 MG: 800 TABLET, FILM COATED ORAL at 12:11

## 2018-11-02 RX ADMIN — IPRATROPIUM BROMIDE AND ALBUTEROL SULFATE 3 ML: .5; 3 SOLUTION RESPIRATORY (INHALATION) at 02:11

## 2018-11-02 RX ADMIN — LIDOCAINE 1 PATCH: 50 PATCH CUTANEOUS at 03:11

## 2018-11-02 RX ADMIN — FOLIC ACID 1 MG: 1 TABLET ORAL at 09:11

## 2018-11-02 RX ADMIN — MIDODRINE HYDROCHLORIDE 10 MG: 5 TABLET ORAL at 06:11

## 2018-11-02 RX ADMIN — MIDODRINE HYDROCHLORIDE 15 MG: 5 TABLET ORAL at 09:11

## 2018-11-02 RX ADMIN — MIDODRINE HYDROCHLORIDE 15 MG: 5 TABLET ORAL at 03:11

## 2018-11-02 RX ADMIN — ALBUMIN HUMAN 25 G: 0.25 SOLUTION INTRAVENOUS at 10:11

## 2018-11-02 RX ADMIN — SEVELAMER CARBONATE 800 MG: 800 TABLET, FILM COATED ORAL at 09:11

## 2018-11-02 NOTE — PLAN OF CARE
[facility] Medicine ICU Acceptance Note    Date of Admit: 10/27/2018  Date of Transfer / Stepdown: 11/1/2018  Bofelipe, C/J, L, Onc (IV chemo w/in 1 month), Gyn/Onc, or other special case?: no    ICU team stepping patient down: MICU  ICU team member giving verbal handoff:  Thalia Momin MD  Accepting  team: OSMAR MCDERMOTT    Brief History of Present Illness:      Femi Enciso is a 52 y/o man with DM2, recent diagnosis of liver failure likely secondary to EtOH abuse in 09/2018, DEL progressing to ESRD admitted to the medicine team for transplant evaluation.   Of note, he was previous in fair health until in September, N/V and diarrhea after sanwitch, resolved, then found have increasing jaundice. Went to hospital, found to have ALI, while he also got hypoxic, CT chest showed large pleural effusion, he was admitted, underwent thoracentesis(removed about 1L), cell study negative for malignancy, and he was treated for PNA and C.diff as well. However, he continue to have weight loss, and start to have AMS, readmitted to hospital, found to have DEL despite ALI. So he was transferred here for eval of liver tx.  Last drink was 9/21/2018.   Denies any fever or chills or chest pain or abdominal pain.     Hospital/ICU Course:   Placed R IJ dialysis line-->CRRT--> HD 10/31 night for 3hrs, tolerated well  R chest tube placed 10/30-> drained about 1.5L per day ->10/31 DC chest tube, on room air     Consultants and Procedures:     Consultants:  Nephrology, Psychiatry, Hepatology, Pulmonology    Procedures:    Chest tube insertion (10/30), Central line for HD    Transfer Information:     Diet:  Renal diet    Physical Activity:  PT/OT    To Do / Pending Studies / Follow ups:  Follow Nephrology recs   Follow Hepatology for Possible Liver transplant workup       Patient has been accepted by [facility] Medicine Team L, who will assume care of the patient upon arrival to the floor from the ICU. Please contact ICU team with any concerns prior to  arrival. Please contact Hospital Medicine at [phone] (please do NOT leave a voicemail) when patient arrives to the floor.    Dante Chawla MD  Staff Hospitalist  Department of Hospital Medicine  [facility]   [phone]

## 2018-11-02 NOTE — HPI
Femi Enciso is a 53yr old male with PMH of acute ETOH hepatitis and DEL/ATN evolved to ESRD requiring HD (not candidate for KTX). He is now s/p liver transplant (SM induction, DBD, CMV D+/R-). Transplant surgery noted severe collateralization, adrenal gland firmly adhered to liver. Bare area of adrenal gland required several stitches and packing to obtain fair hemostasis (EBL 15L). OR take back 11/16 for bleeding from R adrenal bed in AM (significant clot in retroperitoneum, posterior to R hepatic lobe, tract posterior to the R kidney containing significant clot, small bleeding area of retroperitoneal fat) then returned to surgery same day for hemorrhaging closed with wound vac with plans to return to OR 24-48 hours for closure (retroperitoneal /retrorenal/retrocolic hematoma on the right ). Raw retroperitoneal bleeding. Suture ligatures placed, argon beam cautery, evarest placed in retroperitoneum behind cava and right kidney. Significant oozing from upper right adrenal gland, not amenable to ligation, that portion of the adrenal gland was resected with cautery). OR take back 11/18 for washout and incisional closure, no significant bleeding or hematoma found. OR cultures 11/18  from body fluid grew enterococcus faecium VRE. ID was consulted, 11/21 started on daptomycin for VRE treatment and cefepime/flagyl to cover for IA ty in bile. Patient with significant leukocytosis with peak on 11/22 at 48K prompting drainage of R subphrenic fluid collection, perc drain placed, culture grew VRE. Stroke code called 11/21 for lethargy and unresponsive, CT head without evidence of acute ischemic changes and CTA chest negative, vascular neurology was consulted recommended MRI brain, but patient's AMS improved shortly after event. Nephrology consulted for ESRD requiring HD. Patient overall improved on antibiotic regimen, leukocytosis and AMS improved. He was transferred to TSU on POD #15.

## 2018-11-02 NOTE — PHARMACY MED REC
"Admission Medication Reconciliation - Pharmacy Consult Note    The home medication history was taken by Pamela Higgins, Pharmacy Technician.    You may go to "Admission" then "Reconcile Home Medications" tabs to review and/or act upon these items.      No issues noted with the medication reconciliation.      Kellee Villeda, PharmD, BCPS  [id number]                .    .          "

## 2018-11-02 NOTE — PLAN OF CARE
Problem: Occupational Therapy Goal  Goal: Occupational Therapy Goal  Goals to be met by: 11/23/18     Patient will increase functional independence with ADLs by performing:    UE Dressing with Supervision.  LE Dressing with Minimal Assistance.  Grooming while standing at sink with Stand-by Assistance.  Toileting from toilet with Stand-by Assistance for hygiene and clothing management.   Stand pivot transfers with Stand-by Assistance.  Toilet transfer to toilet with Stand-by Assistance.  Upper extremity  theraband exercise program x  15 reps  with independence.  Pt will perform a functional standing task x 8 min with AD as needed and SBA and monitor light headedness and need to sit for safety    Outcome: Ongoing (interventions implemented as appropriate)  Goals set. ISACC Carson  11/2/2018

## 2018-11-02 NOTE — ASSESSMENT & PLAN NOTE
52 y/o man with DM2, ESLD secondary to EtOH abuse in 09/2018, DEL progressing to ESRD requiring RRT. He has had worsening renal function since September 2018. His last drink was in 09/2018. He has had no surgical procedures on abdomen and has been mobilizing well till admission. He may qualify as SLK candidate in view of renal function deterioration over 6 weeks. He needs Transplant nephrology opinion about candidacy for SLK transplant. His cardiac evaluation needs to be updated and cross sectional imaging is needed prior to activation. He is  Surgically acceptable & SC risk A candidate on surgical evaluation. Addiction Psych consult shall be made during this admission.

## 2018-11-02 NOTE — ASSESSMENT & PLAN NOTE
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   Plan:  - No evidence of renal recovery remains anuric.  - HD x 3.5 hrs today for metabolic clearance and volume management  - UF ~ 1 lt as tolerated  - will need perm cath if no evidence of recovery next week  - Renal US with adequate size kidneys no hydro  - UCx  Enterococcus Cloacae  > 100 K on Cefepime which is sensitive  - UPCR low 0.26 g/g  - Strict I/O and chart  - Avoid nephrotoxic medications  - Maintain MAP >65 please continue midodrine  - please keep Hb > 7 gm/dL  - Medication doses adjusted to GFR  - Phos starting to rise will need Renvela in near future if no evidence of recoverr cont renal diet

## 2018-11-02 NOTE — PLAN OF CARE
Problem: Physical Therapy Goal  Goal: Physical Therapy Goal  Goals to be met by: 2018     Patient will increase functional independence with mobility by performin. Supine to sit with Modified [place]  2. Sit to stand transfer with [place]  3. Bed to chair transfer with independence using no AD  4. Gait  x 50 feet with Supervision using LRAD.   5. Lower extremity exercise program x20 reps per handout, with independence     Outcome: Ongoing (interventions implemented as appropriate)  Pt progressing towards goals.     GEOVANNY PAGAN, PT  2018

## 2018-11-02 NOTE — PLAN OF CARE
Problem: Patient Care Overview  Goal: Plan of Care Review  Outcome: Ongoing (interventions implemented as appropriate)  POC reviewed with patient and spouse. AAOX4. BMX3. BG no coverage needed. VVS. Echo done. Hemo Dialysis at bedside No c/o of discomfort/pain. WCTM

## 2018-11-02 NOTE — ASSESSMENT & PLAN NOTE
53 nick richardson male with a history of HTN, HLD, DM Type 2, Depression, and Alcohol Dependence on who Hepatology is being consulted decompensated alcoholic cirrhosis and liver transplant evaluation. Patient with decompensated alcohol cirrhosis and a MELD of 42 therefore liver transplant would be the only live saving/curative measure. Of note patient with strong history of alcohol abuse and multiple complications at this time (UTI, uremia, HE, etc). Patient/family understand that although patient needs a liver he does need to undergo a liver transplant evaluation prior to selection committee and we are unsure how things will progress over the next couple of days due to how sick he is.     Addiction psych deemed patient moderate risk for relapse. He is undergoing inpatient transplant eval. He has a high MELD of 40 but well-appearing today.    Recommendations:  --Daily CMP, CBC, and INR  --Continue antibiotics  --Continue HRS protocol  --Continue lactulose and rifaximin  --Continue folic acid and thiamine   --Nutrition consult  --PT and OT   -- Undergoing testing for transplant eval

## 2018-11-02 NOTE — NURSING
PRE EDUCATION TEACHING NOTE    Femi Enciso was seen in the hospital  today.  Handbook on pre-liver transplant information (see outline below) was given to the patient and time was allowed for questions.  Patient's wife was at his bedside.  Informed consent signed by patient's wife due to patient's request. Written information given on selection criteria.      LIVER TRANSPLANT WORK-UP EDUCATION  I. NATIONAL REGISTRY LISTING  A. Information for listing  B. Regions  C. Per UNOS, can be listed at more than one center  II. SURGERY  A. Length  B. Complications: bleeding, infection  C. Central lines, drains, Barton catheter, incision, endotracheal tube, NG tube  D. Transfusions, cell saver  III. SHORT TERM RECOVERY  A. ICU, PICU, Hospital stay  IV. LONG TERM RECOVERY  A. Labs at home  B. Clinic visits  C. Complications: infection, rejection, readmissions  D. Normal immunity and immunosuppression  E. Incidence of re-admit in 1st year  V. REJECTION  A. Incidence  B. Treatment: Solumedrol, Prograf, Thymoglobulin (actions and side effects)  VI. IMMUNOSUPPRESSIVES  A. Prednisone  B. Imuran/Cellcept  C. Cyclosporin/Prograf  D. Rapamune  E. Need for lifetime compliance  F. Actions and side effects  G. Costs  VII. RECURRENCE OF VIRAL HEPATITIS

## 2018-11-02 NOTE — PROGRESS NOTES
[facility]  Nephrology  Progress Note    Patient Name: Femi Enciso  MRN: [id number]  Admission Date: 10/27/2018  Hospital Length of Stay: 6 days  Attending Provider: Dante Cash MD   Primary Care Physician: Primary Doctor No  Principal Problem:Pre-transplant evaluation for liver transplant    Subjective:     HPI: 52 y/o man with DM2 presents to the ED with family for liver failure (likely due to EtOH abundant history of drinking - diagnosed in Sept 2018 in [place]).  He reports jaundice, generalized weakness, nausea, diarrhea, and decreased appetite since Sept 2018.  He is from [place], [place], and Dr. Sharma (patients physician) recommended bringing him to hospital for evaluation.  Patient denies any fever, chills, vomiting, chest pain, palpitations, SOB, abdominal pain.      Nephrology consulted for evaluation/management Adri.     Interval History:   LAUREN, Had small nausea episode this am but resolved quickly. MS stable alert and awake Oriented x 3. He is hemodynamically stable Off pressors. Remains anuric. Oxygenation stable RA. Fluid balance gain 825 ml overnight. Bb remains elevated.    Review of patient's allergies indicates:   Allergen Reactions    Penicillins Nausea And Vomiting and Rash     Current Facility-Administered Medications   Medication Frequency    0.9%  NaCl infusion PRN    0.9%  NaCl infusion Once    acetaminophen tablet 650 mg Q4H PRN    albuterol-ipratropium 2.5 mg-0.5 mg/3 mL nebulizer solution 3 mL Q6H WAKE    ceFEPIme injection 1 g Q24H    dextrose 50% injection 12.5 g PRN    dextrose 50% injection 25 g PRN    folic acid tablet 1 mg Daily    glucagon (human recombinant) injection 1 mg PRN    glucose chewable tablet 16 g PRN    glucose chewable tablet 24 g PRN    insulin aspart U-100 pen 0-5 Units QID (AC + HS) PRN    lactulose 20 gram/30 mL solution Soln 15 g BID    lidocaine 5 % patch 1 patch Q24H    lidocaine 5 % patch 1 patch Q24H    midodrine tablet 15 mg  TID    multivitamin tablet 1 tablet Daily    ondansetron disintegrating tablet 8 mg Q6H PRN    pantoprazole EC tablet 40 mg Daily    promethazine (PHENERGAN) 12.5 mg in dextrose 5 % 50 mL IVPB Q6H PRN    ramelteon tablet 8 mg Nightly PRN    rifAXIMin tablet 550 mg BID    sevelamer carbonate tablet 800 mg TID WM    sodium bicarbonate tablet 1,300 mg TID    sodium chloride 0.9% flush 5 mL PRN    thiamine tablet 100 mg Daily    traMADol tablet 50 mg Q8H PRN       Objective:     Vital Signs (Most Recent):  Temp: 97.7 °F (36.5 °C) (11/02/18 1226)  Pulse: 84 (11/02/18 1410)  Resp: 17 (11/02/18 1410)  BP: (!) 108/56 (11/02/18 1226)  SpO2: (!) 94 % (11/02/18 1410)  O2 Device (Oxygen Therapy): room air (11/02/18 1410) Vital Signs (24h Range):  Temp:  [97.3 °F (36.3 °C)-98.4 °F (36.9 °C)] 97.7 °F (36.5 °C)  Pulse:  [81-92] 84  Resp:  [16-23] 17  SpO2:  [92 %-96 %] 94 %  BP: ()/(56-68) 108/56     Weight: 81.5 kg (179 lb 10.8 oz) (11/01/18 0400)  Body mass index is 25.78 kg/m².  Body surface area is 2.01 meters squared.    I/O last 3 completed shifts:  In: 2320 [P.O.:1220; I.V.:500; Other:600]  Out: 635 [Urine:35; Other:600]    Physical Exam   Constitutional: He appears well-developed. He appears cachectic. He is cooperative. No distress. Nasal cannula in place.   Temporal musc wasting   HENT:   Head: Normocephalic and atraumatic.   Eyes: Conjunctivae are normal. Pupils are equal, round, and reactive to light.   Neck: Trachea normal and normal range of motion. Neck supple. No JVD present.   Cardiovascular: Normal rate, regular rhythm, S1 normal, S2 normal and normal pulses. Exam reveals no gallop and no friction rub.   No murmur heard.  Pulmonary/Chest: Effort normal. He has decreased breath sounds in the right upper field, the right middle field, the right lower field and the left lower field. He has rhonchi in the left lower field.   Abdominal: Soft. Bowel sounds are normal. He exhibits distension and  ascites. There is no tenderness.   Musculoskeletal: Normal range of motion. He exhibits no edema.   Neurological: He is alert.   Skin: Skin is warm and dry. Capillary refill takes less than 2 seconds.   Psychiatric: He has a normal mood and affect. His behavior is normal.   Vitals reviewed.      Significant Labs:  ABGs: No results for input(s): PH, PCO2, HCO3, POCSATURATED, BE in the last 168 hours.  BMP:   Recent Labs   Lab 11/02/18  0452   *  119*     101   CO2 22*  22*   BUN 61*  61*   CREATININE 6.8*  6.8*   CALCIUM 8.1*  8.1*   MG 2.2  2.2     Cardiac Markers: No results for input(s): CKMB, TROPONINT, MYOGLOBIN in the last 168 hours.  CBC:   Recent Labs   Lab 11/02/18 0452   WBC 13.20*   RBC 3.28*   HGB 11.0*   HCT 31.8*   PLT 59*   MCV 97   MCH 33.5*   MCHC 34.6     CMP:   Recent Labs   Lab 11/02/18  0452   *  119*   CALCIUM 8.1*  8.1*   ALBUMIN 2.4*  2.4*   PROT 5.0*   *  133*   K 4.1  4.1   CO2 22*  22*     101   BUN 61*  61*   CREATININE 6.8*  6.8*   ALKPHOS 184*   ALT 35   AST 63*   BILITOT 36.9*     Coagulation:   Recent Labs   Lab 11/02/18  0452   INR 2.2*     Recent Labs   Lab 10/27/18  1640 10/27/18  1642   COLORU Jackelyn  --    SPECGRAV 1.010  --    PHUR 5.0  --    PROTEINUA Negative  --    BACTERIA  --  Many*   NITRITE Negative  --    LEUKOCYTESUR 1+*  --    HYALINECASTS  --  1     All labs within the past 24 hours have been reviewed.     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   Plan:  - No evidence of renal recovery remains anuric.  - HD x 3.5 hrs today for metabolic clearance and volume management  - UF ~ 1 lt as tolerated  - will need  perm cath if no evidence of recovery next week  - Renal US with adequate size kidneys no hydro  - UCx  Enterococcus Cloacae  > 100 K on Cefepime which is sensitive  - UPCR low 0.26 g/g  - Strict I/O and chart  - Avoid nephrotoxic medications  - Maintain MAP >65 please continue midodrine  - please keep Hb > 7 gm/dL  - Medication doses adjusted to GFR  - Phos starting to rise will need Renvela in near future if no evidence of recoverr cont renal diet           Thank you for your consult. I will follow-up with patient. Please contact us if you have any additional questions.    Nikolay Nino MD  Nephrology  [facility]

## 2018-11-02 NOTE — SUBJECTIVE & OBJECTIVE
Past Medical History:   Diagnosis Date    Liver cirrhosis, alcoholic 09/30/2018     History reviewed. No pertinent surgical history.    Review of patient's allergies indicates:   Allergen Reactions    Penicillins Nausea And Vomiting and Rash     Tolerated cefepime 10/27/18       Family History     None        Tobacco Use    Smoking status: Never Smoker   Substance and Sexual Activity    Alcohol use: Not on file    Drug use: Not on file    Sexual activity: Not on file       Scheduled Meds:   sodium chloride 0.9%   Intravenous Once    albuterol-ipratropium  3 mL Nebulization Q6H WAKE    ceFEPime (MAXIPIME) IVPB  1 g Intravenous Q24H    folic acid  1 mg Oral Daily    lactulose  15 g Oral BID    lidocaine  1 patch Transdermal Q24H    lidocaine  1 patch Transdermal Q24H    midodrine  15 mg Oral TID    multivitamin  1 tablet Oral Daily    pantoprazole  40 mg Oral Daily    rifAXImin  550 mg Oral BID    sevelamer carbonate  800 mg Oral TID WM    sodium bicarbonate  1,300 mg Oral TID    thiamine  100 mg Oral Daily     Continuous Infusions:  PRN Meds:sodium chloride 0.9%, acetaminophen, dextrose 50%, dextrose 50%, glucagon (human recombinant), glucose, glucose, insulin aspart U-100, ondansetron, promethazine (PHENERGAN) IVPB, ramelteon, sodium chloride 0.9%, traMADol    Review of Systems   Constitutional: Positive for activity change, appetite change and fatigue. Negative for chills and fever.   Respiratory: Positive for shortness of breath and wheezing. Negative for cough.    Cardiovascular: Negative for chest pain and leg swelling.   Gastrointestinal: Positive for abdominal distention and nausea. Negative for constipation, diarrhea and rectal pain.   Genitourinary: Negative for difficulty urinating and dysuria.   Musculoskeletal: Negative.    Skin: Negative for color change and rash.   Allergic/Immunologic: Negative for immunocompromised state.   Neurological: Negative for dizziness and light-headedness.    Hematological: Bruises/bleeds easily.   Psychiatric/Behavioral: Positive for confusion.   All other systems reviewed and are negative.     Objective:     Vital Signs (Most Recent):  Temp: 97.7 °F (36.5 °C) (11/02/18 1226)  Pulse: 92 (11/02/18 1226)  Resp: 16 (11/02/18 1226)  BP: (!) 108/56 (11/02/18 1226)  SpO2: 95 % (11/02/18 1226) Vital Signs (24h Range):  Temp:  [97.3 °F (36.3 °C)-98.4 °F (36.9 °C)] 97.7 °F (36.5 °C)  Pulse:  [81-96] 92  Resp:  [16-24] 16  SpO2:  [92 %-96 %] 95 %  BP: ()/(55-68) 108/56     Weight: 81.5 kg (179 lb 10.8 oz)  Body mass index is 25.78 kg/m².      Intake/Output Summary (Last 24 hours) at 11/2/2018 1247  Last data filed at 11/2/2018 0600  Gross per 24 hour   Intake 620 ml   Output 10 ml   Net 610 ml       Physical Exam   Constitutional: He is oriented to person, place, and time. No distress.   HENT:   Mouth/Throat: No oropharyngeal exudate.   Eyes: Pupils are equal, round, and reactive to light. Scleral icterus is present.   Neck: Neck supple. No thyromegaly present.   Cardiovascular: Normal heart sounds and intact distal pulses.   No murmur heard.  Pulmonary/Chest: No respiratory distress. He has wheezes. He has no rales. He exhibits no tenderness.   Abdominal: Soft. Bowel sounds are normal. He exhibits distension. He exhibits no mass. There is no tenderness. There is no rebound and no guarding. No hernia.       Musculoskeletal: He exhibits no edema.   Neurological: He is alert and oriented to person, place, and time.   Skin: Skin is dry. Capillary refill takes less than 2 seconds. No rash noted. He is not diaphoretic.   Psychiatric: He has a normal mood and affect.   Nursing note and vitals reviewed.      Laboratory:  Labs within the past month have been reviewed.    Diagnostic Results:  I have personally reviewed all pertinent imaging studies.

## 2018-11-02 NOTE — PLAN OF CARE
"Problem: Patient Care Overview  Goal: Plan of Care Review  Outcome: Ongoing (interventions implemented as appropriate)  Patient AAOx4, calm and cooperative. No acute events overnight. Vitals stable overnight. BM x6 overnight. BG during bedtime, no coverage needed. Pt  Encouraged to turn q2 hours. Refuses to be turned states "feels uncomfortable". Waffle mattress in used. Pt stable will continue to monitor.       "

## 2018-11-02 NOTE — PROGRESS NOTES
Transplant Recipient Adult Psychosocial Assessment-Inpatient Evaluation completed with patient and patient's wife    Femi Enciso  [address] [place] [address]  Telephone Information:   Mobile [phone]   Home  [phone] (home)  Work  [phone] (work)  E-mail  No e-mail address on record    Sex: male  YOB: 1965  Age: 53 y.o.    Encounter Date: 10/27/2018  [place] Citizen: yes  Primary Language: English   Needed: no    Emergency Contact:  Name: Suzy Enciso  Relationship: wife  Address: same as patient  Phone Numbers:  [phone] (mobile)    Family/Social Support:   Number of dependents/: None  Marital history: The patient has been  once and currently to Suzy Enciso for 34 years.  Other family dynamics: The patient's parents are both alive and well (father-W.A. Fernie/87; mother-Ambrosio/77) who reside in [place], [place].  Patient has one brother, Mehul, who resides in [place], [place] and a brother who is .  Patient and his wife have two adult daughters: Ann (29) who resides in [place], [place] and Brinda (26) who resides in [place], [place].  Patient also has two grandchildren with one on the way, five dogs and one cat.    Household Composition:  Name: Femi Enciso  Age: 53  Relationship: patient  Does person drive? yes    Name: Suzy Enciso  Age: unknown  Relationship: wife  Does person drive? yes    Do you and your caregivers have access to reliable transportation? yes  PRIMARY CAREGIVER: Suzy Enciso will be primary caregiver, phone number [phone].      provided in-depth information to patient and caregiver regarding pre- and post-transplant caregiver role.   strongly encourages patient and caregiver to have concrete plan regarding post-transplant care giving, including back-up caregiver(s) to ensure care giving needs are met as needed.    Patient and Caregiver states understanding all aspects of caregiver role/commitment and is  able/willing/committed to being caregiver to the fullest extent necessary.    Patient and Caregiver verbalizes understanding of the education provided today and caregiver responsibilities.         remains available. Patient and Caregiver agree to contact  in a timely manner if concerns arise.      Able to take time off work without financial concerns: yes; patient's care giver is utilizing FMLA which is valid until January 17, 2019.    Additional Significant Others who will Assist with Transplant:  Name: Ann Goel  Age: 29  City: [place] State: [place]  Relationship: daughter  Does person drive? yes   Phone number: [phone]    Name: Ambrosio Enciso  Age: 77   City: [facility] State: [place]  Relationship: mother  Does person drive? yes   Phone number: [phone]    Living Will: no  Healthcare Power of : no; however, patient advised he does have a completed Healthcare Power of ; therefore, he was encouraged to visit the Release of Information Office to have a copy scanned to his chart  Advance Directives on file: <<no information> per medical record.  Verbally reviewed LW/HCPA information.   provided patient with copy of LW/HCPA documents and provided education on completion of forms.    Living Donors: No.    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    Spouse/Significant Other Employment: Patient's wife is employed with the [employer] as a nurse .    Disabled: no    Monthly Income:  long term: $0-patient has not drawn from his jail at this time/Spouse's monthly salary: $8600  Able to afford all costs now and if  transplanted, including medications: yes  Patient and Caregiver verbalizes understanding of personal responsibilities related to transplant costs and the importance of having a financial plan to ensure that patients transplant costs are fully covered.       provided fundraising information/education. Patient and Caregiververbalizes understanding.   remains available.    Insurance:   Payor/Plan Subscr  Sex Relation Sub. Ins. ID Effective Group Num   1. BLUE CROSS BL* FIDEL CARRENO 3/13/1964 Female  [id number] 1/10/16 113                                   [address] [address]     Primary Insurance (for UNOS reporting): Private Insurance-BCBS  Secondary Insurance (for UNOS reporting): None  Patient and Caregiver verbalizes clear understanding that patient may experience difficulty obtaining and/or be denied insurance coverage post-surgery. This includes and is not limited to disability insurance, life insurance, health insurance, burial insurance, long term care insurance, and other insurances.      Patient and Caregiver also reports understanding that future health concerns related to or unrelated to transplantation may not be covered by patient's insurance.  Resources and information provided and reviewed.     Patient and Caregiver provides verbal permission to release any necessary information to outside resources for patient care and discharge planning.  Resources and information provided are reviewed.      Dialysis Adherence: Patient and Caregiver reports receiving no previous dialysis.    Infusion Service: patient utilizing? no  Home Health: patient utilizing? no  DME: no  Pulmonary/Cardiac Rehab: no   ADLS:  Patient reports being independent and was driving prior to hospitalization.    Adherence:   Patient reports positive adherence with medical instructions and medication management as instructed.  Adherence education and counseling provided.     Per History Section:  Past Medical History:    Diagnosis Date    Liver cirrhosis, alcoholic 09/30/2018     Social History     Tobacco Use    Smoking status: Never Smoker   Substance Use Topics    Alcohol use: Not on file     Social History     Substance and Sexual Activity   Drug Use Not on file     Social History     Substance and Sexual Activity   Sexual Activity Not on file       Per Today's Psychosocial:  Tobacco: none, patient denies any use.  Alcohol: none, patient denies any use; patient reports his last use of ETOH as September 21, 2018.  Patient received his liver diagnosis on October 3, 2018; therefore, discontinued alcohol use prior to his diagnosis due to a decompensation in health.  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  Patient reports completing an IOP with [facility] five years ago.  Patient explained he remained sober for two and a half years then relapsed due to the inability to remain sober. Patient attended AA meetings for four to five months approximately two years ago.  Patient stated AA did not positively assist in sustaining his sobriety and expressed wanting to enroll into an IOP post transplant; however, not being fond of having to attend AA.   Illicit Drugs/Non-prescribed Medications: none, patient denies any use.    Patient and Caregiver states clear understanding of the potential impact of substance use as it relates to transplant candidacy and is aware of possible random substance screening.  Substance abstinence/cessation counseling, education and resources provided and reviewed.     Arrests/DWI/Treatment/Rehab: patient denies    Psychiatric History:    Mental Health: anxiety/depression.  During patient's inpatient admittance at [facility] five years ago he received a dual diagnosis of Depression and Substance Use.  Psychiatrist/Counselor: Patient received substance abuse counseling with [facility] in [place], [place].   Patient received individual therapy for approximately six months with his last session being dated sometime in November of 2017.  Patient stated he successfully completed all goals of treatment and was terminated appropriately.   Medications:  Patient reports his anxiety/depression is currently being treated with Lexapro 20 mg  Suicide/Homicide Issues: Patient denies any SI/HI   Safety at home: Patient denies any past/present safety issues within his home and feels safe and secured.    Knowledge: Patient and Caregiver states having clear understanding and realistic expectations regarding the potential risks and potential benefits of organ transplantation and organ donation and agrees to discuss with health care team members and support system members, as well as to utilize available resources and express questions and/or concerns in order to further facilitate the pt informed decision-making.  Resources and information provided and reviewed.    Patient and Caregiver is aware of [facility]'s affiliation and/or partnership with agencies in home health care, LTAC, SNF, [facility], and other hospitals and clinics.    Understanding: Patient and Caregiver reports having a clear understanding of the many lifetime commitments involved with being a transplant recipient, including costs, compliance, medications, lab work, procedures, appointments, concrete and financial planning, preparedness, timely and appropriate communication of concerns, abstinence (ETOH, tobacco, illicit non-prescribed drugs), adherence to all health care team recommendations, support system and caregiver involvement, appropriate and timely resource utilization and follow-through, mental health counseling as needed/recommended, and patient and caregiver responsibilities.  Social Service Handbook, resources and detailed educational information provided and reviewed.  Educational information provided.    Patient and Caregiver also reports current and expected  "compliance with health care regime and states having a clear understanding of the importance of compliance.      Patient and Caregiver reports a clear understanding that risks and benefits may be involved with organ transplantation and with organ donation.       Patient and Caregiver also reports clear understanding that psychosocial risk factors may affect patient, and include but are not limited to feelings of depression, generalized anxiety, anxiety regarding dependence on others, post traumatic stress disorder, feelings of guilt and other emotional and/or mental concerns, and/or exacerbation of existing mental health concerns.  Detailed resources provided and discussed.      Patient and Caregiver agrees to access appropriate resources in a timely manner as needed and/or as recommended, and to communicate concerns appropriately.  Patient and Caregiver also reports a clear understanding of treatment options available.     Patient and Caregiver received education in a group setting.   reviewed education, provided additional information, and answered questions.    Feelings or Concerns: Patient     Coping: Patient expressed concern about "waking up" from sedation due to surgery.  Patient states he is usually stressed about "everything"; however, since being locally he reports feeling much more at ease.  Patient's wife agreed with the patient's self assessment.    Goals: Patient reports wanting to spend quality time with his grandchildren, anticipating the arrival of his new granddaughter, to begin exercising and walking/jogging and implementing tennis into his lifestyle again ([employer]).  Patient referred to Vocational Rehabilitation.    Interview Behavior: Patient and Caregiver presents as alert and oriented x 4, pleasant, good eye contact, recall good, concentration/judgement good, average intelligence, calm and communicative.          Transplant Social Work - " Candidacy  Assessment/Plan:     Psychosocial Suitability: Patient presents as a marginal candidate for liver transplant at this time. Based on psychosocial risk factors, patient presents as high risk, due to an extensive substance use history with multiple reinstatement (relapse) occurrences and recent sobriety of less than two months.  Protective factors: adequate medical insurance, solid caregiver plan, appropriate finances and agreeing to enroll into an IOP or ABU.    Recommendations/Additional Comments: Enrollment into [facility] or an IOP of his choice pre-transplant, if able to do so pending hospitalization stay.  Should the patient be approved for transplant, enroll into [facility] ABU or an IOP of his choice post transplant.  Due to patient reporting AA as a trigger for relapse, it is recommended he receives individual counseling services with a licensed addiction counselor (LAC) of his choice or as recommended after completing ABU/IOP for aftercare.  Patient will also require local lodging while recovering.      Marizol Acosta LMSW

## 2018-11-02 NOTE — ASSESSMENT & PLAN NOTE
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   Plan:  - No evidence of renal recovery remains anuric.  - Tolerated HD trial yesterday no need for RRT today will plan for am in [place] if transferred out of MICU.  - will need perm cath if no evidence of recovery  - Renal US with adequate size kidneys no hydro  - UCx  Enterococcus Cloacae  > 100 K on Cefepime which is sensitive  - UPCR low 0.26 g/g  - Strict I/O and chart  - Avoid nephrotoxic medications  - Maintain MAP >65 please continue midodrine  - please keep Hb > 7 gm/dL  - Medication doses adjusted to GFR

## 2018-11-02 NOTE — CONSULTS
[facility]  Infectious Disease  Consult Note    Patient Name: Femi Enciso  MRN: [id number]  Admission Date: 10/27/2018  Hospital Length of Stay: 6 days  Attending Physician: Dante Cash MD  Primary Care Provider: Primary Doctor No        Inpatient consult to Infectious Diseases  Consult performed by: ALISON Huerta Jr.  Consult ordered by: Maurice Williamson MD      Consult received.  ID serologies ordered this am by primary team and have not yet resulted.  Full consult to follow.      ALISON Potts  Infectious Disease  [facility]

## 2018-11-02 NOTE — CONSULTS
"[facility]  Liver Transplant  Consult Note    Patient Name: Femi Enciso  MRN: [id number]  Admission Date: 10/27/2018  Hospital Length of Stay: 6 days  Code Status: Full Code  Attending Provider: Dante Cash MD  Primary Care Provider: Primary Doctor No    Consults  Subjective:     History of Present Illness:  MELD-Na score: 42 at 11/2/2018  4:52 AM  MELD score: 42 at 11/2/2018  4:52 AM  Calculated from:  Serum Creatinine: 6.8 mg/dL (Rounded to 4 mg/dL) at 11/2/2018  4:52 AM  Serum Sodium: 133 mmol/L at 11/2/2018  4:52 AM  Total Bilirubin: 36.9 mg/dL at 11/2/2018  4:52 AM  INR(ratio): 2.2 at 11/2/2018  4:52 AM  Age: 53 years     Estimated body mass index is 25.78 kg/m² as calculated from the following:    Height as of this encounter: 5' 10" (1.778 m).    Weight as of this encounter: 81.5 kg (179 lb 10.8 oz).     54 y/o man with DM2, ESLD secondary to EtOH abuse in 09/2018, DEL progressing to ESRD admitted to the medicine team for transplant evaluation. Of note, he was previous in fair health until in September, N/V and diarrhea after sanwitch, resolved, then found have increasing jaundice. Went to hospital, found to have ALI, while he also got hypoxic, CT chest showed large pleural effusion, he was admitted, underwent thoracentesis(removed about 1L), cell study negative for malignancy, and he was treated for PNA and C.diff as well. However, he continue to have weight loss, and start to have AMS, readmitted to hospital, found to have DEL despite ALI. He has been on RRT on this admission to [facility].Last drink was 9/21/2018. Denies any fever or chills or chest pain or abdominal pain.         Past Medical History:   Diagnosis Date    Liver cirrhosis, alcoholic 09/30/2018     History reviewed. No pertinent surgical history.    Review of patient's allergies indicates:   Allergen Reactions    Penicillins Nausea And Vomiting and Rash     Tolerated cefepime 10/27/18       Family History     None    "     Tobacco Use    Smoking status: Never Smoker   Substance and Sexual Activity    Alcohol use: Not on file    Drug use: Not on file    Sexual activity: Not on file       Scheduled Meds:   sodium chloride 0.9%   Intravenous Once    albuterol-ipratropium  3 mL Nebulization Q6H WAKE    ceFEPime (MAXIPIME) IVPB  1 g Intravenous Q24H    folic acid  1 mg Oral Daily    lactulose  15 g Oral BID    lidocaine  1 patch Transdermal Q24H    lidocaine  1 patch Transdermal Q24H    midodrine  15 mg Oral TID    multivitamin  1 tablet Oral Daily    pantoprazole  40 mg Oral Daily    rifAXImin  550 mg Oral BID    sevelamer carbonate  800 mg Oral TID WM    sodium bicarbonate  1,300 mg Oral TID    thiamine  100 mg Oral Daily     Continuous Infusions:  PRN Meds:sodium chloride 0.9%, acetaminophen, dextrose 50%, dextrose 50%, glucagon (human recombinant), glucose, glucose, insulin aspart U-100, ondansetron, promethazine (PHENERGAN) IVPB, ramelteon, sodium chloride 0.9%, traMADol    Review of Systems   Constitutional: Positive for activity change, appetite change and fatigue. Negative for chills and fever.   Respiratory: Positive for shortness of breath and wheezing. Negative for cough.    Cardiovascular: Negative for chest pain and leg swelling.   Gastrointestinal: Positive for abdominal distention and nausea. Negative for constipation, diarrhea and rectal pain.   Genitourinary: Negative for difficulty urinating and dysuria.   Musculoskeletal: Negative.    Skin: Negative for color change and rash.   Allergic/Immunologic: Negative for immunocompromised state.   Neurological: Negative for dizziness and light-headedness.   Hematological: Bruises/bleeds easily.   Psychiatric/Behavioral: Positive for confusion.   All other systems reviewed and are negative.     Objective:     Vital Signs (Most Recent):  Temp: 97.7 °F (36.5 °C) (11/02/18 1226)  Pulse: 92 (11/02/18 1226)  Resp: 16 (11/02/18 1226)  BP: (!) 108/56 (11/02/18  1226)  SpO2: 95 % (11/02/18 1226) Vital Signs (24h Range):  Temp:  [97.3 °F (36.3 °C)-98.4 °F (36.9 °C)] 97.7 °F (36.5 °C)  Pulse:  [81-96] 92  Resp:  [16-24] 16  SpO2:  [92 %-96 %] 95 %  BP: ()/(55-68) 108/56     Weight: 81.5 kg (179 lb 10.8 oz)  Body mass index is 25.78 kg/m².      Intake/Output Summary (Last 24 hours) at 11/2/2018 1247  Last data filed at 11/2/2018 0600  Gross per 24 hour   Intake 620 ml   Output 10 ml   Net 610 ml       Physical Exam   Constitutional: He is oriented to person, place, and time. No distress.   HENT:   Mouth/Throat: No oropharyngeal exudate.   Eyes: Pupils are equal, round, and reactive to light. Scleral icterus is present.   Neck: Neck supple. No thyromegaly present.   Cardiovascular: Normal heart sounds and intact distal pulses.   No murmur heard.  Pulmonary/Chest: No respiratory distress. He has wheezes. He has no rales. He exhibits no tenderness.   Abdominal: Soft. Bowel sounds are normal. He exhibits distension. He exhibits no mass. There is no tenderness. There is no rebound and no guarding. No hernia.       Musculoskeletal: He exhibits no edema.   Neurological: He is alert and oriented to person, place, and time.   Skin: Skin is dry. Capillary refill takes less than 2 seconds. No rash noted. He is not diaphoretic.   Psychiatric: He has a normal mood and affect.   Nursing note and vitals reviewed.      Laboratory:  Labs within the past month have been reviewed.    Diagnostic Results:  I have personally reviewed all pertinent imaging studies.    Assessment/Plan:     * Pre-transplant evaluation for liver transplant      52 y/o man with DM2, ESLD secondary to EtOH abuse in 09/2018, DEL progressing to ESRD requiring RRT. He has had worsening renal function since September 2018. His last drink was in 09/2018. He has had no surgical procedures on abdomen and has been mobilizing well till admission. He may qualify as SLK candidate in view of renal function deterioration over 6  weeks. He needs Transplant nephrology opinion about candidacy for SLK transplant. His cardiac evaluation needs to be updated and cross sectional imaging is needed prior to activation. He is  Surgically acceptable & SC risk A candidate on surgical evaluation. Addiction Psych consult shall be made during this admission.         Transplant Candidacy: Patient is a 53 y.o. year old male with alcoholic liver disease being evaluated for possible OLT.  In my opinion, he is a high-risk liver/kidney transplant candidate.  He will be presented to selection committee after all tests and evaluations are complete.      UNOS Patient Status  Functional Status: 60% - Requires occasional assistance but is able to care for needs  Physical Capacity: Limited Mobility    Counseling: I discussed with the patient the benefits of liver transplantation.  We discussed the evaluation and listing procedures.  We discussed the MELD system and the associated waiting times.  We discussed national and center specific survival results.  We discussed the option of being multiply listed in different OPOs.   We discussed the risks, benefits and potential complications related to surgery including the risks related to anesthesia, bleeding, infection, primary non-function of the allograft, the risk of reoperation as well as the risk of death.  We discussed the typical post-operative course, length of hospitalization, the long-term use of immunosuppressive therapy as well as the need for long-term routine follow-up.    PHS: I discussed the use of organs from donors with PHS increased-risk behavior, including the testing protocols utilized, as well as data from the literature regarding the likelihood of transmission of hepatitis or HIV.  The patient that he is willing to consider such grafts.  DCD: I discussed the use of organs recovered by donation after cardiac death (DCD), including slightly decreased graft survival and greater risk of arterial and  biliary complications. The potential advantage to the recipient is possibly receiving a transplant sooner by accepting such an organ. The patient that he is willing to consider such grafts.  HBcAb: I discussed the use of organs from donors with HBcAb in conjunction with long term use of HBV antiviral drugs, such as lamivudine. The small but measurable risk of hepatitis B seroconversion was discussed as well as the potentially life long need to continue antiviral drugs. The patient that he is willing to consider such grafts.  HCV Non-viremic recipient: I discussed the use of HCV-positive organs in naive recipients, including the risk of viral transmission to the patients or others, potential insurance barriers for antiviral medication coverage, risk for fibrosing cholestatic hepatitis, death or graft loss. The potential advantage to the recipient is the possibility of receiving a transplant sooner with decreased mortality risk by accepting such an organ. The patient that he is willing to consider such grafts.  LDLT: I discussed the nature of living donor liver transplant, including donor risks and more frequent recipient complications. The patient that he is willing to consider such grafts.    Taras Wylie MD  Liver Transplant  [facility]

## 2018-11-02 NOTE — CONSULTS
Patient off floor having stress test at time of being seen.  Will attempt to see this weekend.    Call [phone] over weekend during work hours for any questions.    Adelfo Remy PA-C

## 2018-11-02 NOTE — PT/OT/SLP EVAL
Occupational Therapy   Evaluation/Treatment    Name: Femi Enciso  MRN: [id number]  Admitting Diagnosis:  Acute liver failure without hepatic coma      Recommendations:     Discharge Recommendations: (TBD)  Discharge Equipment Recommendations:  (TBD)  Barriers to discharge:  None    History:     Occupational Profile:  Living Environment: Pt lives with spouse in [facility] with 1 step. Walk in shower with stool and tub/shower.  Previous level of function: Indep, drove, retired computers  Equipment Used at Home:  (shower stool)  Assistance upon Discharge: Spouse    Past Medical History:   Diagnosis Date    Liver cirrhosis, alcoholic 09/30/2018       History reviewed. No pertinent surgical history.    Subjective     Chief Complaint: weakness and dizziness  Patient/Family Comments/goals: get stronger and have a more active life    Pain/Comfort:  · Pain Rating 1: 0/10  · Pain Rating Post-Intervention 1: 0/10    Patients cultural, spiritual, Zoroastrianism conflicts given the current situation: none stated    Objective:     Communicated with: nurse prior to session.  Patient found supine in bed with spouse present and telemetry, central line upon OT entry to room.    General Precautions: Standard, fall(NPO for test)   Orthopedic Precautions:N/A   Braces: N/A     Occupational Performance:    Bed Mobility:    · Patient completed Scooting/Bridging with supervision  · Patient completed Supine to Sit with supervision increased time due to dizziness precautionary technique    Functional Mobility/Transfers:  · Patient completed Sit <> Stand Transfer with minimum assistance  with  hand-held assist   · Functional Mobility: Pt ambulated in room approx 10ft with min A HHA. Pt is unsteady. Educated re: safety, use of RW(ordered for room), assistance needed when OOB    Activities of Daily Living:  · Lower Body Dressing: total assistance for socks--pt with decreased flexibility in hips and hamstrings-educated pt and spouse on stretches to  "perform    Cognitive/Visual Perceptual:  Cognitive/Psychosocial Skills:     -       Oriented to: Person, Place, Time and Situation   -       Follows Commands/attention:Follows one-step commands  -       Communication: clear/fluent  -       Safety awareness/insight to disability: intact   -       Mood/Affect/Coping skills/emotional control: Appropriate to situation    Physical Exam:  Balance:  Min A  Dominant hand:    -       Left  Upper Extremity Range of Motion:     -       Right Upper Extremity: WFL .  -       Left Upper Extremity: WFL .   Upper Extremity Strength:    -       Right Upper Extremity: 4/5  -       Left Upper Extremity: 4/5       AMPAC 6 Click ADL:  AMPAC Total Score: 13    Treatment & Education:  Pt and spouse educated on OT role and POC, LE stretching to increase flexibility for LBD, theraband exercises, use of RW, assistance when OOB, safety, and OOB as tolerated  Education:    Patient left EOB with wife present    Assessment:     Femi Enciso is a 53 y.o. male with a medical diagnosis of Acute liver failure without hepatic coma.  He presents with the following performance deficits affecting function: weakness, impaired endurance, impaired self care skills, impaired balance, gait instability, impaired functional mobilty, decreased coordination, decreased lower extremity function, decreased ROM, impaired coordination.      Rehab Prognosis: Good; patient would benefit from acute skilled OT services to address these deficits and reach maximum level of function.         Clinical Decision Makin.  OT Low:  "Pt evaluation falls under low complexity for evaluation coding due to performance deficits noted in 1-3 areas as stated above and 0 co-morbities affecting current functional status. Data obtained from problem focused assessments. No modifications or assistance was required for completion of evaluation. Only brief occupational profile and history review completed."     Plan:     Patient to be " seen 2 x/week to address the above listed problems via self-care/home management, therapeutic activities, therapeutic exercises  · Plan of Care Expires: 12/02/18  · Plan of Care Reviewed with: patient, spouse    This Plan of care has been discussed with the patient who was involved in its development and understands and is in agreement with the identified goals and treatment plan    GOALS:   Multidisciplinary Problems     Occupational Therapy Goals        Problem: Occupational Therapy Goal    Goal Priority Disciplines Outcome Interventions   Occupational Therapy Goal     OT, PT/OT Ongoing (interventions implemented as appropriate)    Description:  Goals to be met by: 11/23/18     Patient will increase functional independence with ADLs by performing:    UE Dressing with Supervision.  LE Dressing with Minimal Assistance.  Grooming while standing at sink with Stand-by Assistance.  Toileting from toilet with Stand-by Assistance for hygiene and clothing management.   Stand pivot transfers with Stand-by Assistance.  Toilet transfer to toilet with Stand-by Assistance.  Upper extremity  theraband exercise program x  15 reps  with independence.  Pt will perform a functional standing task x 8 min with AD as needed and SBA and monitor light headedness and need to sit for safety                      Time Tracking:     OT Date of Treatment: 11/02/18  OT Start Time: 1213  OT Stop Time: 1237  OT Total Time (min): 24 min    Billable Minutes:Evaluation 10  Therapeutic Activity 14    ISACC [place]  11/2/2018

## 2018-11-02 NOTE — PT/OT/SLP PROGRESS
Physical Therapy Treatment    Patient Name:  Femi Enciso   MRN:  [id number]    Recommendations:     Discharge Recommendations:  home with home health   Discharge Equipment Recommendations: walker, rolling   Barriers to discharge: None  3  Assessment:     Femi Enciso is a 53 y.o. male admitted with a medical diagnosis of Pre-transplant evaluation for liver transplant.  He presents with the following impairments/functional limitations:  weakness, gait instability, impaired endurance, impaired balance, impaired functional mobilty, impaired self care skills, decreased safety awareness. Pt performed bed mobility and transfers SBA. Pt amb ~150ft SBA with RW, vc's for AD management; no LOB and mild SOB. Pt will continue to benefit from skilled PT to improve deficits and increase overall functional mobility.     Rehab Prognosis:  Good; patient would benefit from acute skilled PT services to address these deficits and reach maximum level of function.      Recent Surgery: * No surgery found *      Plan:     During this hospitalization, patient to be seen 2 x/week to address the above listed problems via gait training, therapeutic activities, therapeutic exercises, neuromuscular re-education  · Plan of Care Expires:  11/30/18   Plan of Care Reviewed with: patient, spouse    Subjective     Communicated with RN prior to session.  Patient found supine in bed and wife present upon PT entry to room, agreeable to treatment.      Chief Complaint: NA  Patient comments/goals: return to PLOF  Pain/Comfort:  · Pain Rating 1: 0/10  · Pain Rating Post-Intervention 1: 0/10    Patients cultural, spiritual, Holiness conflicts given the current situation: none    Objective:     Patient found with: telemetry     General Precautions: Standard, fall   Orthopedic Precautions:N/A   Braces: N/A     Functional Mobility:  Bed Mobility:     · Supine to Sit: stand by assistance    Transfers:     · Sit to Stand:  stand by assistance with rolling  walker    Gait: ~150ft SBA with RW, vc's for AD management; no LOB and mild SOB    AM-PAC 6 CLICK MOBILITY  Turning over in bed (including adjusting bedclothes, sheets and blankets)?: 3  Sitting down on and standing up from a chair with arms (e.g., wheelchair, bedside commode, etc.): 3  Moving from lying on back to sitting on the side of the bed?: 3  Moving to and from a bed to a chair (including a wheelchair)?: 3  Need to walk in hospital room?: 3  Climbing 3-5 steps with a railing?: 2  Basic Mobility Total Score: 17       Therapeutic Activities and Exercises:  Pt sat EOB with S.  Pt and wife educated on:  -role of PT/POC  -safety with mobility  -importance of OOB activity  -up in chair for majority of the day  -amb several times daily  Pt safe to amb with RW with RN staff.     Patient left up in chair with all lines intact, call button in reach, RN notified and wife present..    GOALS:   Multidisciplinary Problems     Physical Therapy Goals        Problem: Physical Therapy Goal    Goal Priority Disciplines Outcome Goal Variances Interventions   Physical Therapy Goal     PT, PT/OT Ongoing (interventions implemented as appropriate)     Description:  Goals to be met by: 2018     Patient will increase functional independence with mobility by performin. Supine to sit with Modified [place]  2. Sit to stand transfer with [place]  3. Bed to chair transfer with independence using no AD  4. Gait  x 50 feet with Supervision using LRAD.   5. Lower extremity exercise program x20 reps per handout, with independence                      Time Tracking:     PT Received On: 18  PT Start Time: 1041     PT Stop Time: 1056  PT Total Time (min): 15 min     Billable Minutes: Gait Training 15    Treatment Type: Treatment  PT/PTA: PT     PTA Visit Number: 0     GEOVANNY PAGAN, PT  2018

## 2018-11-02 NOTE — PROGRESS NOTES
[facility]  Nephrology  Progress Note    Patient Name: Femi Enciso  MRN: [id number]  Admission Date: 10/27/2018  Hospital Length of Stay: 5 days  Attending Provider: Dante Cash MD   Primary Care Physician: Primary Doctor No  Principal Problem:Acute liver failure without hepatic coma    Subjective:     HPI: 54 y/o man with DM2 presents to the ED with family for liver failure (likely due to EtOH abundant history of drinking - diagnosed in Sept 2018 in [place]).  He reports jaundice, generalized weakness, nausea, diarrhea, and decreased appetite since Sept 2018.  He is from [place], [place], and Dr. Sharma (patients physician) recommended bringing him to hospital for evaluation.  Patient denies any fever, chills, vomiting, chest pain, palpitations, SOB, abdominal pain.      Nephrology consulted for evaluation/management Adri.     Interval History:   Tolerated HD yesterday with adequate clearance. MS much better. He is hemodynamically stable Off pressors. Oxygenation stable RA with pleural effusion drained. Fluid balance gain 1000 ml overnight. BB remains elevted. Food tray was not compliant with Renal diet  him on that.    Review of patient's allergies indicates:   Allergen Reactions    Penicillins Nausea And Vomiting and Rash     Current Facility-Administered Medications   Medication Frequency    0.9%  NaCl infusion PRN    0.9%  NaCl infusion Once    acetaminophen tablet 650 mg Q4H PRN    albuterol-ipratropium 2.5 mg-0.5 mg/3 mL nebulizer solution 3 mL Q6H WAKE    ceFEPIme injection 1 g Q24H    dextrose 50% injection 12.5 g PRN    dextrose 50% injection 25 g PRN    folic acid tablet 1 mg Daily    glucagon (human recombinant) injection 1 mg PRN    glucose chewable tablet 16 g PRN    glucose chewable tablet 24 g PRN    insulin aspart U-100 pen 0-5 Units QID (AC + HS) PRN    lactulose 20 gram/30 mL solution Soln 20 g BID    lidocaine 5 % patch 1 patch Q24H    lidocaine 5 % patch 1  patch Q24H    midodrine tablet 10 mg Q6H    multivitamin tablet 1 tablet Daily    ondansetron disintegrating tablet 8 mg Q6H PRN    pantoprazole EC tablet 40 mg Daily    promethazine (PHENERGAN) 12.5 mg in dextrose 5 % 50 mL IVPB Q6H PRN    rifAXIMin tablet 550 mg BID    sevelamer carbonate tablet 800 mg TID WM    sodium bicarbonate tablet 1,300 mg TID    sodium chloride 0.9% flush 5 mL PRN    thiamine tablet 100 mg Daily    traMADol tablet 50 mg Q8H PRN       Objective:     Vital Signs (Most Recent):  Temp: 98.1 °F (36.7 °C) (11/01/18 1748)  Pulse: 87 (11/01/18 1748)  Resp: 17 (11/01/18 1748)  BP: 118/62 (11/01/18 1748)  SpO2: 95 % (11/01/18 1748)  O2 Device (Oxygen Therapy): room air (11/01/18 1748) Vital Signs (24h Range):  Temp:  [97.7 °F (36.5 °C)-98.8 °F (37.1 °C)] 98.1 °F (36.7 °C)  Pulse:  [75-96] 87  Resp:  [14-38] 17  SpO2:  [92 %-97 %] 95 %  BP: ()/(54-66) 118/62     Weight: 81.5 kg (179 lb 10.8 oz) (11/01/18 0400)  Body mass index is 25.78 kg/m².  Body surface area is 2.01 meters squared.    I/O last 3 completed shifts:  In: 2080 [P.O.:980; I.V.:500; Other:600]  Out: 695 [Urine:95; Other:600]    Physical Exam   Constitutional: He appears well-developed. He appears cachectic. He is cooperative. No distress. Nasal cannula in place.   Temporal musc wasting   HENT:   Head: Normocephalic and atraumatic.   Eyes: Conjunctivae are normal. Pupils are equal, round, and reactive to light.   Neck: Trachea normal and normal range of motion. Neck supple. No JVD present.   Cardiovascular: Normal rate, regular rhythm, S1 normal, S2 normal and normal pulses. Exam reveals no gallop and no friction rub.   No murmur heard.  Pulmonary/Chest: Effort normal. He has decreased breath sounds in the right upper field, the right middle field, the right lower field and the left lower field. He has rhonchi in the left lower field.   Abdominal: Soft. Bowel sounds are normal. He exhibits distension and ascites. There  is no tenderness.   Musculoskeletal: Normal range of motion. He exhibits no edema.   Neurological: He is alert.   Skin: Skin is warm and dry. Capillary refill takes less than 2 seconds.   Psychiatric: He has a normal mood and affect. His behavior is normal.   Vitals reviewed.      Significant Labs:  ABGs: No results for input(s): PH, PCO2, HCO3, POCSATURATED, BE in the last 168 hours.  BMP:   Recent Labs   Lab 11/01/18  1324   *      CO2 21*   BUN 50*   CREATININE 5.4*   CALCIUM 7.9*   MG 2.2     Cardiac Markers: No results for input(s): CKMB, TROPONINT, MYOGLOBIN in the last 168 hours.  CBC:   Recent Labs   Lab 11/01/18  0400   WBC 9.77   RBC 3.17*   HGB 10.6*   HCT 30.6*   PLT 45*   MCV 97   MCH 33.4*   MCHC 34.6     CMP:   Recent Labs   Lab 11/01/18  0400 11/01/18  1324   *  111* 169*   CALCIUM 8.0*  8.0* 7.9*   ALBUMIN 2.5*  2.5* 2.6*   PROT 4.9*  --      137 135*   K 3.9  3.9 4.1   CO2 23  23 21*     105 103   BUN 44*  44* 50*   CREATININE 4.5*  4.5* 5.4*   ALKPHOS 174*  --    ALT 41  --    AST 60*  --    BILITOT 36.2*  --      Coagulation:   Recent Labs   Lab 11/01/18  0400   INR 2.3*     Recent Labs   Lab 10/27/18  1640 10/27/18  1642   COLORU Jackelyn  --    SPECGRAV 1.010  --    PHUR 5.0  --    PROTEINUA Negative  --    BACTERIA  --  Many*   NITRITE Negative  --    LEUKOCYTESUR 1+*  --    HYALINECASTS  --  1     All labs within the past 24 hours have been reviewed.     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   Plan:  - No evidence of renal recovery remains anuric.  - Tolerated HD trial yesterday no need for RRT today will plan for am in [place] if transferred out of  MICU.  - will need perm cath if no evidence of recovery  - Renal US with adequate size kidneys no hydro  - UCx  Enterococcus Cloacae  > 100 K on Cefepime which is sensitive  - UPCR low 0.26 g/g  - Strict I/O and chart  - Avoid nephrotoxic medications  - Maintain MAP >65 please continue midodrine  - please keep Hb > 7 gm/dL  - Medication doses adjusted to GFR           Thank you for your consult. I will follow-up with patient. Please contact us if you have any additional questions.    Nikolay Nino MD  Nephrology  [facility]

## 2018-11-02 NOTE — HOSPITAL COURSE
Pt c/o CP 12/21. EKG stable from prior. Troponin wnl. CP resolved 12/22. CXR 12/20 showed no detrimental change. Pt hypertensive prior to HD.  Dialysis tolerated well 12/21 with 1.5L taken off. Bps much improved after dialysis, but monitoring closely.    Interval History: no acute events overnight. Pt reports feeling well this am. No nausea noted this am and pt reporting increased appetite. Given slight improvement will hold off on EGD/PEG tube placement this am and will allow pt to attempt to maintain nutrition with PO intake. [place] tube accidentally removed 1/6/19 and has been out since. Pt remains with back pain and Zanaflex recently changed to Flexeril with some improvement. Cont Marinol. Cont Carafate. Cr level stable.  Good UOP.  Last HD 12/26/18.  Responded appropriately to blood transfusion 1/4/19. Continue aggressive PT daily.

## 2018-11-02 NOTE — SUBJECTIVE & OBJECTIVE
Interval History: Patient appears well today, no signs of confusion. He was approved for liver transplant evaluation. He was stepped down from ICU to IM-L service.    Current Facility-Administered Medications   Medication    0.9%  NaCl infusion    0.9%  NaCl infusion    acetaminophen tablet 650 mg    albuterol-ipratropium 2.5 mg-0.5 mg/3 mL nebulizer solution 3 mL    ceFEPIme injection 1 g    dextrose 50% injection 12.5 g    dextrose 50% injection 25 g    folic acid tablet 1 mg    glucagon (human recombinant) injection 1 mg    glucose chewable tablet 16 g    glucose chewable tablet 24 g    insulin aspart U-100 pen 0-5 Units    lactulose 20 gram/30 mL solution Soln 15 g    lidocaine 5 % patch 1 patch    lidocaine 5 % patch 1 patch    midodrine tablet 15 mg    multivitamin tablet 1 tablet    ondansetron disintegrating tablet 8 mg    pantoprazole EC tablet 40 mg    promethazine (PHENERGAN) 12.5 mg in dextrose 5 % 50 mL IVPB    ramelteon tablet 8 mg    rifAXIMin tablet 550 mg    sevelamer carbonate tablet 800 mg    sodium bicarbonate tablet 1,300 mg    sodium chloride 0.9% flush 5 mL    thiamine tablet 100 mg    traMADol tablet 50 mg       Objective:     Vital Signs (Most Recent):  Temp: 97.7 °F (36.5 °C) (11/02/18 1226)  Pulse: 84 (11/02/18 1410)  Resp: 17 (11/02/18 1410)  BP: (!) 108/56 (11/02/18 1226)  SpO2: (!) 94 % (11/02/18 1410) Vital Signs (24h Range):  Temp:  [97.3 °F (36.3 °C)-98.4 °F (36.9 °C)] 97.7 °F (36.5 °C)  Pulse:  [81-92] 84  Resp:  [16-23] 17  SpO2:  [92 %-96 %] 94 %  BP: ()/(56-68) 108/56     Weight: 81.5 kg (179 lb 10.8 oz) (11/01/18 0400)  Body mass index is 25.78 kg/m².    Physical Exam   Constitutional: He is oriented to person, place, and time. No distress.   Eyes: Scleral icterus is present.   Cardiovascular: Normal rate and regular rhythm.   Pulmonary/Chest: Effort normal and breath sounds normal.   Abdominal: Soft. He exhibits no distension. There is no  tenderness.   Musculoskeletal: He exhibits no edema or deformity.   Neurological: He is alert and oriented to person, place, and time.   Skin: Skin is warm and dry.   Psychiatric: He has a normal mood and affect.   Vitals reviewed.      MELD-Na score: 42 at 11/2/2018  4:52 AM  MELD score: 42 at 11/2/2018  4:52 AM  Calculated from:  Serum Creatinine: 6.8 mg/dL (Rounded to 4 mg/dL) at 11/2/2018  4:52 AM  Serum Sodium: 133 mmol/L at 11/2/2018  4:52 AM  Total Bilirubin: 36.9 mg/dL at 11/2/2018  4:52 AM  INR(ratio): 2.2 at 11/2/2018  4:52 AM  Age: 53 years    Significant Labs:  CBC:   Recent Labs   Lab 11/02/18 0452   WBC 13.20*   RBC 3.28*   HGB 11.0*   HCT 31.8*   PLT 59*     CMP:   Recent Labs   Lab 11/02/18 0452   *  119*   CALCIUM 8.1*  8.1*   ALBUMIN 2.4*  2.4*   PROT 5.0*   *  133*   K 4.1  4.1   CO2 22*  22*     101   BUN 61*  61*   CREATININE 6.8*  6.8*   ALKPHOS 184*   ALT 35   AST 63*   BILITOT 36.9*     Coagulation:   Recent Labs   Lab 11/02/18 0452   INR 2.2*

## 2018-11-02 NOTE — PROGRESS NOTES
[facility]  Hepatology  Progress Note    Patient Name: Femi Enciso  MRN: [id number]  Admission Date: 10/27/2018  Hospital Length of Stay: 6 days  Attending Provider: Dante Cash MD   Primary Care Physician: Primary Doctor No  Principal Problem:Pre-transplant evaluation for liver transplant    Subjective:     Transplant status: No    HPI: 53 yea rold male with a history of HTN, HLD, DM Type 2, Depression, and Alcohol Dependence on who Hepatology is being consulted decompensated alcoholic cirrhosis and liver transplant evaluation.     History obtain from chart review (records in care everywhere) and from speaking to daughter/wife who are both RN's.  Patient's wife states that he was in fair health until the end of September when they were on vacation (on 9/21/18 they went to [facility]).  She states that after eating a sandwhich, patient became very ill and started having diarrhea, nausea, and emesis (wife denies seeing patient consume alcohol during this time).  Eventually  gastroenteritis cleared up, however patient became jaundice and went to go see his GI doctor and was found to have elevated LFTs and hypoxic and was admitted to their local hospital.  Patient had a large right sided effusion which was tapped (1L negative cytology) and he was treated for a PNA.  Patient had a non contrasted abdominal CT at that time which showed multiple hypodensities and US of the abdomen was recommend which no discrete lesions. Furthermore he was also started on steroids for alcoholic hepatitis which were stopped when patient did not improve.     [place] [facility] [place] and he was approved for outpatient evaluation as well as appointment with Dr. Sharma. On 10/26 [facility] nurse spoke to patient's wife and reported that patient was worsening our team recommend going to the hospital for admission. Family instead got on a commercial flight and brought patient to ED.     Social  "History:  Patient has been a binge drinker for ~20+ years. When he binges he drinks a 5th of Vodka per day for a couple of days and then stops. He drinks to create an "alternative reality" per wife. He was sober from 1362-3911. In 2014 daughter had a stroke and he subsequently entered an IP [facility] for Depression and Alcohol dependence. He also worked with a counselor during this period who eventually discharged him from counseling. Of note has tried AA in the past but has not been effective. Family has not seen him drink in ~ 6 months however they did find a debit card transaction for alcohol purchase on 7/28/18 as well as an empty 5th of Vodka (this was after spending ~ 1 month with his daughter). He follows with a local PCP who did mention his LFT's were elevated last year and he subsequently stopped with resolution of his LFT's. No mention of cirrhosis until this September. In March 2018 he quit his job as a  as he was not happy with his job. Family feels that around this time is when he began to decline. Prior to September they reported intermittent episodes of confusion and inappropriateness. No DUI or incarcerations.    Summary of Care Everywhere:  Labs  10/2    A1AT (-)   Hep A antibody total + on 10/2 and - on 10/3   Hep A IgM -   Hep B core antibody total -   Hep B core IgM -   Hep B surface antibody and antigen -   Hep C antibody -   Hep E IgG and IgM -  10/3  C diff toxin B +  10/4   GI stool pathogen - (campy, salmonella, shigella, vibrio, yersinia, noro, rota)    Imaging/Cardiac Workup:  CT abdomen/pelvis 10/1/18  Liver: Enlarged liver measuring 22 cm in the midline.  There is multiple areas of low density within the liver.  Recommend a follow-up renal sonogram to rule out focal small hepatic lesions or cysts.  Biliary:  Mildly distended gallbladder.  Gallbladder wall appears thick.  No calcified gallstones are seen.  Cannot exclude sludge within the gallbladder as seen on " image number 34.  Pancreas: Normal.  No lesion, fluid collection, ductal dilatation, or atrophy.  No pancreatitis or pseudocysts are seen.  Spleen: Mild splenomegaly noted.    ECHO 10/2/18  Left Ventricle-Mildly enlarged left ventricular cavity size. Normal left ventricular wall thickness. Normal left ventricular  systolic function. Left ventricular ejection fraction is estimated at 65 %. No regional wall motion abnormalities. Normal diastolic function.    Right Ventricle-Normal right ventricular size and systolic function, RVSP 42.7 mmHg.          Interval History: Patient appears well today, no signs of confusion. He was approved for liver transplant evaluation. He was stepped down from ICU to IM-L service.    Current Facility-Administered Medications   Medication    0.9%  NaCl infusion    0.9%  NaCl infusion    acetaminophen tablet 650 mg    albuterol-ipratropium 2.5 mg-0.5 mg/3 mL nebulizer solution 3 mL    ceFEPIme injection 1 g    dextrose 50% injection 12.5 g    dextrose 50% injection 25 g    folic acid tablet 1 mg    glucagon (human recombinant) injection 1 mg    glucose chewable tablet 16 g    glucose chewable tablet 24 g    insulin aspart U-100 pen 0-5 Units    lactulose 20 gram/30 mL solution Soln 15 g    lidocaine 5 % patch 1 patch    lidocaine 5 % patch 1 patch    midodrine tablet 15 mg    multivitamin tablet 1 tablet    ondansetron disintegrating tablet 8 mg    pantoprazole EC tablet 40 mg    promethazine (PHENERGAN) 12.5 mg in dextrose 5 % 50 mL IVPB    ramelteon tablet 8 mg    rifAXIMin tablet 550 mg    sevelamer carbonate tablet 800 mg    sodium bicarbonate tablet 1,300 mg    sodium chloride 0.9% flush 5 mL    thiamine tablet 100 mg    traMADol tablet 50 mg       Objective:     Vital Signs (Most Recent):  Temp: 97.7 °F (36.5 °C) (11/02/18 1226)  Pulse: 84 (11/02/18 1410)  Resp: 17 (11/02/18 1410)  BP: (!) 108/56 (11/02/18 1226)  SpO2: (!) 94 % (11/02/18 1410) Vital Signs  (24h Range):  Temp:  [97.3 °F (36.3 °C)-98.4 °F (36.9 °C)] 97.7 °F (36.5 °C)  Pulse:  [81-92] 84  Resp:  [16-23] 17  SpO2:  [92 %-96 %] 94 %  BP: ()/(56-68) 108/56     Weight: 81.5 kg (179 lb 10.8 oz) (11/01/18 0400)  Body mass index is 25.78 kg/m².    Physical Exam   Constitutional: He is oriented to person, place, and time. No distress.   Eyes: Scleral icterus is present.   Cardiovascular: Normal rate and regular rhythm.   Pulmonary/Chest: Effort normal and breath sounds normal.   Abdominal: Soft. He exhibits no distension. There is no tenderness.   Musculoskeletal: He exhibits no edema or deformity.   Neurological: He is alert and oriented to person, place, and time.   Skin: Skin is warm and dry.   Psychiatric: He has a normal mood and affect.   Vitals reviewed.      MELD-Na score: 42 at 11/2/2018  4:52 AM  MELD score: 42 at 11/2/2018  4:52 AM  Calculated from:  Serum Creatinine: 6.8 mg/dL (Rounded to 4 mg/dL) at 11/2/2018  4:52 AM  Serum Sodium: 133 mmol/L at 11/2/2018  4:52 AM  Total Bilirubin: 36.9 mg/dL at 11/2/2018  4:52 AM  INR(ratio): 2.2 at 11/2/2018  4:52 AM  Age: 53 years    Significant Labs:  CBC:   Recent Labs   Lab 11/02/18  0452   WBC 13.20*   RBC 3.28*   HGB 11.0*   HCT 31.8*   PLT 59*     CMP:   Recent Labs   Lab 11/02/18  0452   *  119*   CALCIUM 8.1*  8.1*   ALBUMIN 2.4*  2.4*   PROT 5.0*   *  133*   K 4.1  4.1   CO2 22*  22*     101   BUN 61*  61*   CREATININE 6.8*  6.8*   ALKPHOS 184*   ALT 35   AST 63*   BILITOT 36.9*     Coagulation:   Recent Labs   Lab 11/02/18  0452   INR 2.2*         Assessment/Plan:     Decompensated hepatic cirrhosis    53 yea rold male with a history of HTN, HLD, DM Type 2, Depression, and Alcohol Dependence on who Hepatology is being consulted decompensated alcoholic cirrhosis and liver transplant evaluation. Patient with decompensated alcohol cirrhosis and a MELD of 42 therefore liver transplant would be the only live saving/curative  measure. Of note patient with strong history of alcohol abuse and multiple complications at this time (UTI, uremia, HE, etc). Patient/family understand that although patient needs a liver he does need to undergo a liver transplant evaluation prior to selection committee and we are unsure how things will progress over the next couple of days due to how sick he is.     Addiction psych deemed patient moderate risk for relapse. He is undergoing inpatient transplant eval. He has a high MELD of 40 but well-appearing today.    Recommendations:  --Daily CMP, CBC, and INR  --Continue antibiotics  --Continue HRS protocol  --Continue lactulose and rifaximin  --Continue folic acid and thiamine   --Nutrition consult  --PT and OT   -- Undergoing testing for transplant eval         Thank you for your consult. I will follow-up with patient. Please contact us if you have any additional questions.    Maurice Williamson MD  Hepatology  [facility]

## 2018-11-02 NOTE — SUBJECTIVE & OBJECTIVE
Interval History:   LAUREN, Had small nausea episode this am but resolved quickly. MS stable alert and awake Oriented x 3. He is hemodynamically stable Off pressors. Remains anuric. Oxygenation stable RA. Fluid balance gain 825 ml overnight. Bb remains elevated.    Review of patient's allergies indicates:   Allergen Reactions    Penicillins Nausea And Vomiting and Rash     Current Facility-Administered Medications   Medication Frequency    0.9%  NaCl infusion PRN    0.9%  NaCl infusion Once    acetaminophen tablet 650 mg Q4H PRN    albuterol-ipratropium 2.5 mg-0.5 mg/3 mL nebulizer solution 3 mL Q6H WAKE    ceFEPIme injection 1 g Q24H    dextrose 50% injection 12.5 g PRN    dextrose 50% injection 25 g PRN    folic acid tablet 1 mg Daily    glucagon (human recombinant) injection 1 mg PRN    glucose chewable tablet 16 g PRN    glucose chewable tablet 24 g PRN    insulin aspart U-100 pen 0-5 Units QID (AC + HS) PRN    lactulose 20 gram/30 mL solution Soln 15 g BID    lidocaine 5 % patch 1 patch Q24H    lidocaine 5 % patch 1 patch Q24H    midodrine tablet 15 mg TID    multivitamin tablet 1 tablet Daily    ondansetron disintegrating tablet 8 mg Q6H PRN    pantoprazole EC tablet 40 mg Daily    promethazine (PHENERGAN) 12.5 mg in dextrose 5 % 50 mL IVPB Q6H PRN    ramelteon tablet 8 mg Nightly PRN    rifAXIMin tablet 550 mg BID    sevelamer carbonate tablet 800 mg TID WM    sodium bicarbonate tablet 1,300 mg TID    sodium chloride 0.9% flush 5 mL PRN    thiamine tablet 100 mg Daily    traMADol tablet 50 mg Q8H PRN       Objective:     Vital Signs (Most Recent):  Temp: 97.7 °F (36.5 °C) (11/02/18 1226)  Pulse: 84 (11/02/18 1410)  Resp: 17 (11/02/18 1410)  BP: (!) 108/56 (11/02/18 1226)  SpO2: (!) 94 % (11/02/18 1410)  O2 Device (Oxygen Therapy): room air (11/02/18 1410) Vital Signs (24h Range):  Temp:  [97.3 °F (36.3 °C)-98.4 °F (36.9 °C)] 97.7 °F (36.5 °C)  Pulse:  [81-92] 84  Resp:  [16-23]  17  SpO2:  [92 %-96 %] 94 %  BP: ()/(56-68) 108/56     Weight: 81.5 kg (179 lb 10.8 oz) (11/01/18 0400)  Body mass index is 25.78 kg/m².  Body surface area is 2.01 meters squared.    I/O last 3 completed shifts:  In: 2320 [P.O.:1220; I.V.:500; Other:600]  Out: 635 [Urine:35; Other:600]    Physical Exam   Constitutional: He appears well-developed. He appears cachectic. He is cooperative. No distress. Nasal cannula in place.   Temporal musc wasting   HENT:   Head: Normocephalic and atraumatic.   Eyes: Conjunctivae are normal. Pupils are equal, round, and reactive to light.   Neck: Trachea normal and normal range of motion. Neck supple. No JVD present.   Cardiovascular: Normal rate, regular rhythm, S1 normal, S2 normal and normal pulses. Exam reveals no gallop and no friction rub.   No murmur heard.  Pulmonary/Chest: Effort normal. He has decreased breath sounds in the right upper field, the right middle field, the right lower field and the left lower field. He has rhonchi in the left lower field.   Abdominal: Soft. Bowel sounds are normal. He exhibits distension and ascites. There is no tenderness.   Musculoskeletal: Normal range of motion. He exhibits no edema.   Neurological: He is alert.   Skin: Skin is warm and dry. Capillary refill takes less than 2 seconds.   Psychiatric: He has a normal mood and affect. His behavior is normal.   Vitals reviewed.      Significant Labs:  ABGs: No results for input(s): PH, PCO2, HCO3, POCSATURATED, BE in the last 168 hours.  BMP:   Recent Labs   Lab 11/02/18  0452   *  119*     101   CO2 22*  22*   BUN 61*  61*   CREATININE 6.8*  6.8*   CALCIUM 8.1*  8.1*   MG 2.2  2.2     Cardiac Markers: No results for input(s): CKMB, TROPONINT, MYOGLOBIN in the last 168 hours.  CBC:   Recent Labs   Lab 11/02/18  0452   WBC 13.20*   RBC 3.28*   HGB 11.0*   HCT 31.8*   PLT 59*   MCV 97   MCH 33.5*   MCHC 34.6     CMP:   Recent Labs   Lab 11/02/18  0452   *  119*    CALCIUM 8.1*  8.1*   ALBUMIN 2.4*  2.4*   PROT 5.0*   *  133*   K 4.1  4.1   CO2 22*  22*     101   BUN 61*  61*   CREATININE 6.8*  6.8*   ALKPHOS 184*   ALT 35   AST 63*   BILITOT 36.9*     Coagulation:   Recent Labs   Lab 11/02/18  0452   INR 2.2*     Recent Labs   Lab 10/27/18  1640 10/27/18  1642   COLORU Jackelyn  --    SPECGRAV 1.010  --    PHUR 5.0  --    PROTEINUA Negative  --    BACTERIA  --  Many*   NITRITE Negative  --    LEUKOCYTESUR 1+*  --    HYALINECASTS  --  1     All labs within the past 24 hours have been reviewed.     Significant Imaging:  Labs: Reviewed  US: Reviewed

## 2018-11-02 NOTE — SUBJECTIVE & OBJECTIVE
Interval History:   Tolerated HD yesterday with adequate clearance. MS much better. He is hemodynamically stable Off pressors. Oxygenation stable RA with pleural effusion drained. Fluid balance gain 1000 ml overnight. BB remains elevted. Food tray was not compliant with Renal diet  him on that.    Review of patient's allergies indicates:   Allergen Reactions    Penicillins Nausea And Vomiting and Rash     Current Facility-Administered Medications   Medication Frequency    0.9%  NaCl infusion PRN    0.9%  NaCl infusion Once    acetaminophen tablet 650 mg Q4H PRN    albuterol-ipratropium 2.5 mg-0.5 mg/3 mL nebulizer solution 3 mL Q6H WAKE    ceFEPIme injection 1 g Q24H    dextrose 50% injection 12.5 g PRN    dextrose 50% injection 25 g PRN    folic acid tablet 1 mg Daily    glucagon (human recombinant) injection 1 mg PRN    glucose chewable tablet 16 g PRN    glucose chewable tablet 24 g PRN    insulin aspart U-100 pen 0-5 Units QID (AC + HS) PRN    lactulose 20 gram/30 mL solution Soln 20 g BID    lidocaine 5 % patch 1 patch Q24H    lidocaine 5 % patch 1 patch Q24H    midodrine tablet 10 mg Q6H    multivitamin tablet 1 tablet Daily    ondansetron disintegrating tablet 8 mg Q6H PRN    pantoprazole EC tablet 40 mg Daily    promethazine (PHENERGAN) 12.5 mg in dextrose 5 % 50 mL IVPB Q6H PRN    rifAXIMin tablet 550 mg BID    sevelamer carbonate tablet 800 mg TID WM    sodium bicarbonate tablet 1,300 mg TID    sodium chloride 0.9% flush 5 mL PRN    thiamine tablet 100 mg Daily    traMADol tablet 50 mg Q8H PRN       Objective:     Vital Signs (Most Recent):  Temp: 98.1 °F (36.7 °C) (11/01/18 1748)  Pulse: 87 (11/01/18 1748)  Resp: 17 (11/01/18 1748)  BP: 118/62 (11/01/18 1748)  SpO2: 95 % (11/01/18 1748)  O2 Device (Oxygen Therapy): room air (11/01/18 1748) Vital Signs (24h Range):  Temp:  [97.7 °F (36.5 °C)-98.8 °F (37.1 °C)] 98.1 °F (36.7 °C)  Pulse:  [75-96] 87  Resp:  [14-38] 17  SpO2:   [92 %-97 %] 95 %  BP: ()/(54-66) 118/62     Weight: 81.5 kg (179 lb 10.8 oz) (11/01/18 0400)  Body mass index is 25.78 kg/m².  Body surface area is 2.01 meters squared.    I/O last 3 completed shifts:  In: 2080 [P.O.:980; I.V.:500; Other:600]  Out: 695 [Urine:95; Other:600]    Physical Exam   Constitutional: He appears well-developed. He appears cachectic. He is cooperative. No distress. Nasal cannula in place.   Temporal musc wasting   HENT:   Head: Normocephalic and atraumatic.   Eyes: Conjunctivae are normal. Pupils are equal, round, and reactive to light.   Neck: Trachea normal and normal range of motion. Neck supple. No JVD present.   Cardiovascular: Normal rate, regular rhythm, S1 normal, S2 normal and normal pulses. Exam reveals no gallop and no friction rub.   No murmur heard.  Pulmonary/Chest: Effort normal. He has decreased breath sounds in the right upper field, the right middle field, the right lower field and the left lower field. He has rhonchi in the left lower field.   Abdominal: Soft. Bowel sounds are normal. He exhibits distension and ascites. There is no tenderness.   Musculoskeletal: Normal range of motion. He exhibits no edema.   Neurological: He is alert.   Skin: Skin is warm and dry. Capillary refill takes less than 2 seconds.   Psychiatric: He has a normal mood and affect. His behavior is normal.   Vitals reviewed.      Significant Labs:  ABGs: No results for input(s): PH, PCO2, HCO3, POCSATURATED, BE in the last 168 hours.  BMP:   Recent Labs   Lab 11/01/18  1324   *      CO2 21*   BUN 50*   CREATININE 5.4*   CALCIUM 7.9*   MG 2.2     Cardiac Markers: No results for input(s): CKMB, TROPONINT, MYOGLOBIN in the last 168 hours.  CBC:   Recent Labs   Lab 11/01/18  0400   WBC 9.77   RBC 3.17*   HGB 10.6*   HCT 30.6*   PLT 45*   MCV 97   MCH 33.4*   MCHC 34.6     CMP:   Recent Labs   Lab 11/01/18  0400 11/01/18  1324   *  111* 169*   CALCIUM 8.0*  8.0* 7.9*   ALBUMIN  2.5*  2.5* 2.6*   PROT 4.9*  --      137 135*   K 3.9  3.9 4.1   CO2 23  23 21*     105 103   BUN 44*  44* 50*   CREATININE 4.5*  4.5* 5.4*   ALKPHOS 174*  --    ALT 41  --    AST 60*  --    BILITOT 36.2*  --      Coagulation:   Recent Labs   Lab 11/01/18  0400   INR 2.3*     Recent Labs   Lab 10/27/18  1640 10/27/18  1642   COLORU Jackelyn  --    SPECGRAV 1.010  --    PHUR 5.0  --    PROTEINUA Negative  --    BACTERIA  --  Many*   NITRITE Negative  --    LEUKOCYTESUR 1+*  --    HYALINECASTS  --  1     All labs within the past 24 hours have been reviewed.     Significant Imaging:  Labs: Reviewed  US: Reviewed

## 2018-11-03 LAB
ABO + RH BLD: NORMAL
ALBUMIN SERPL BCP-MCNC: 2.5 G/DL
ALP SERPL-CCNC: 181 U/L
ALT SERPL W/O P-5'-P-CCNC: 32 U/L
ANION GAP SERPL CALC-SCNC: 9 MMOL/L
AST SERPL-CCNC: 57 U/L
BASOPHILS # BLD AUTO: 0.09 K/UL
BASOPHILS NFR BLD: 0.9 %
BILIRUB SERPL-MCNC: 35.5 MG/DL
BLD GP AB SCN CELLS X3 SERPL QL: NORMAL
BUN SERPL-MCNC: 43 MG/DL
CALCIUM SERPL-MCNC: 8.6 MG/DL
CHLORIDE SERPL-SCNC: 102 MMOL/L
CO2 SERPL-SCNC: 23 MMOL/L
CREAT SERPL-MCNC: 5.1 MG/DL
DIFFERENTIAL METHOD: ABNORMAL
EOSINOPHIL # BLD AUTO: 0.5 K/UL
EOSINOPHIL NFR BLD: 4.5 %
ERYTHROCYTE [DISTWIDTH] IN BLOOD BY AUTOMATED COUNT: 19.3 %
EST. GFR  (AFRICAN AMERICAN): 13.8 ML/MIN/1.73 M^2
EST. GFR  (NON AFRICAN AMERICAN): 11.9 ML/MIN/1.73 M^2
GLUCOSE SERPL-MCNC: 92 MG/DL
HCT VFR BLD AUTO: 30.8 %
HGB BLD-MCNC: 10.3 G/DL
IMM GRANULOCYTES # BLD AUTO: 0.19 K/UL
IMM GRANULOCYTES NFR BLD AUTO: 1.8 %
INR PPP: 2.1
LYMPHOCYTES # BLD AUTO: 1.2 K/UL
LYMPHOCYTES NFR BLD: 12 %
MAGNESIUM SERPL-MCNC: 2 MG/DL
MCH RBC QN AUTO: 33.4 PG
MCHC RBC AUTO-ENTMCNC: 33.4 G/DL
MCV RBC AUTO: 100 FL
MONOCYTES # BLD AUTO: 1.6 K/UL
MONOCYTES NFR BLD: 15.1 %
NEUTROPHILS # BLD AUTO: 6.8 K/UL
NEUTROPHILS NFR BLD: 65.7 %
NRBC BLD-RTO: 0 /100 WBC
PHOSPHATE SERPL-MCNC: 4.1 MG/DL
PLATELET # BLD AUTO: 50 K/UL
PMV BLD AUTO: 12.2 FL
POCT GLUCOSE: 120 MG/DL (ref 70–110)
POCT GLUCOSE: 146 MG/DL (ref 70–110)
POCT GLUCOSE: 148 MG/DL (ref 70–110)
POCT GLUCOSE: 160 MG/DL (ref 70–110)
POTASSIUM SERPL-SCNC: 4.2 MMOL/L
PROT SERPL-MCNC: 5 G/DL
PROTHROMBIN TIME: 20.8 SEC
RBC # BLD AUTO: 3.08 M/UL
SODIUM SERPL-SCNC: 134 MMOL/L
VARICELLA INTERPRETATION: POSITIVE
VARICELLA ZOSTER IGG: 4.18 ISR
WBC # BLD AUTO: 10.34 K/UL

## 2018-11-03 PROCEDURE — 25000003 PHARM REV CODE 250: Mod: NTX | Performed by: HOSPITALIST

## 2018-11-03 PROCEDURE — 83735 ASSAY OF MAGNESIUM: CPT | Mod: NTX

## 2018-11-03 PROCEDURE — 99233 SBSQ HOSP IP/OBS HIGH 50: CPT | Mod: NTX,,, | Performed by: HOSPITALIST

## 2018-11-03 PROCEDURE — 25000003 PHARM REV CODE 250: Mod: NTX | Performed by: INTERNAL MEDICINE

## 2018-11-03 PROCEDURE — 99233 PR SUBSEQUENT HOSPITAL CARE,LEVL III: ICD-10-PCS | Mod: NTX,,, | Performed by: HOSPITALIST

## 2018-11-03 PROCEDURE — 80053 COMPREHEN METABOLIC PANEL: CPT | Mod: NTX

## 2018-11-03 PROCEDURE — 20600001 HC STEP DOWN PRIVATE ROOM: Mod: NTX

## 2018-11-03 PROCEDURE — 84100 ASSAY OF PHOSPHORUS: CPT | Mod: NTX

## 2018-11-03 PROCEDURE — 25000003 PHARM REV CODE 250: Mod: NTX | Performed by: STUDENT IN AN ORGANIZED HEALTH CARE EDUCATION/TRAINING PROGRAM

## 2018-11-03 PROCEDURE — 85610 PROTHROMBIN TIME: CPT | Mod: NTX

## 2018-11-03 PROCEDURE — 25000242 PHARM REV CODE 250 ALT 637 W/ HCPCS: Mod: NTX | Performed by: HOSPITALIST

## 2018-11-03 PROCEDURE — 94761 N-INVAS EAR/PLS OXIMETRY MLT: CPT | Mod: NTX

## 2018-11-03 PROCEDURE — 94640 AIRWAY INHALATION TREATMENT: CPT | Mod: NTX

## 2018-11-03 PROCEDURE — 85025 COMPLETE CBC W/AUTO DIFF WBC: CPT | Mod: NTX

## 2018-11-03 RX ADMIN — RIFAXIMIN 550 MG: 550 TABLET ORAL at 09:11

## 2018-11-03 RX ADMIN — LIDOCAINE 1 PATCH: 50 PATCH CUTANEOUS at 03:11

## 2018-11-03 RX ADMIN — IPRATROPIUM BROMIDE AND ALBUTEROL SULFATE 3 ML: .5; 3 SOLUTION RESPIRATORY (INHALATION) at 08:11

## 2018-11-03 RX ADMIN — MIDODRINE HYDROCHLORIDE 15 MG: 5 TABLET ORAL at 09:11

## 2018-11-03 RX ADMIN — LIDOCAINE 1 PATCH: 50 PATCH CUTANEOUS at 09:11

## 2018-11-03 RX ADMIN — ONDANSETRON 8 MG: 8 TABLET, ORALLY DISINTEGRATING ORAL at 09:11

## 2018-11-03 RX ADMIN — MIDODRINE HYDROCHLORIDE 15 MG: 5 TABLET ORAL at 03:11

## 2018-11-03 RX ADMIN — SODIUM BICARBONATE 650 MG TABLET 1300 MG: at 09:11

## 2018-11-03 RX ADMIN — SODIUM BICARBONATE 650 MG TABLET 1300 MG: at 03:11

## 2018-11-03 RX ADMIN — IPRATROPIUM BROMIDE AND ALBUTEROL SULFATE 3 ML: .5; 3 SOLUTION RESPIRATORY (INHALATION) at 01:11

## 2018-11-03 RX ADMIN — SEVELAMER CARBONATE 800 MG: 800 TABLET, FILM COATED ORAL at 12:11

## 2018-11-03 RX ADMIN — LACTULOSE 15 G: 20 SOLUTION ORAL at 09:11

## 2018-11-03 RX ADMIN — SEVELAMER CARBONATE 800 MG: 800 TABLET, FILM COATED ORAL at 05:11

## 2018-11-03 RX ADMIN — PANTOPRAZOLE SODIUM 40 MG: 40 TABLET, DELAYED RELEASE ORAL at 09:11

## 2018-11-03 RX ADMIN — THERA TABS 1 TABLET: TAB at 09:11

## 2018-11-03 RX ADMIN — SEVELAMER CARBONATE 800 MG: 800 TABLET, FILM COATED ORAL at 09:11

## 2018-11-03 RX ADMIN — FOLIC ACID 1 MG: 1 TABLET ORAL at 09:11

## 2018-11-03 RX ADMIN — Medication 100 MG: at 09:11

## 2018-11-03 NOTE — PLAN OF CARE
Problem: Patient Care Overview  Goal: Plan of Care Review  Outcome: Ongoing (interventions implemented as appropriate)  POC reviewed with patient. AAOx4. BP trending low, scheduled midodrine administered, all other VSS. No complaints of pain. HD performed at the bedside, 2.5L removed. Evening lactulose dose held to >4 BMs. Pt safety maintained throughout shift, pt remains free of falls/injury. WCTM.

## 2018-11-03 NOTE — PLAN OF CARE
Problem: Patient Care Overview  Goal: Plan of Care Review  Outcome: Ongoing (interventions implemented as appropriate)  Plan of care reviewed with patient and spouse. Alert and oriented x 4. VSS. Out of bed at mealtime. Glucometer check before meals. Continues on 800 ml fluid restriction. Hemodialysis last night. No falls or other acute changes. Will continue to monitor.

## 2018-11-03 NOTE — PROGRESS NOTES
Hospital Medicine  Progress note    Team: [facility] HOSP MED L Dante Cash MD  Admit Date: 10/27/2018  GWEN 11/7/2018  Code status: Full Code    Principal Problem:  Pre-transplant evaluation for liver transplant    Interval hx: Patient seen and examined at bedside, stress test negative for any ischemia. Awaiting presentation at Liver selection next week. High MELD and Renal failure makes discharge prohibitive.     ROS     Constitutional: Positive for activity change, appetite change, chills, fatigue and unexpected weight change. Negative for diaphoresis and fever.   HENT: Negative for congestion, drooling, ear discharge, facial swelling, hearing loss, sore throat and tinnitus.    Eyes: Negative.         Icteral jaundice   Respiratory: Positive for shortness of breath. Negative for cough, choking, chest tightness and stridor.    Cardiovascular: Negative.  Negative for chest pain, palpitations and leg swelling.   Gastrointestinal: Positive for abdominal distention, diarrhea, nausea and vomiting. Negative for abdominal pain, anal bleeding, blood in stool and constipation.   Endocrine: Negative.  Negative for polydipsia, polyphagia and polyuria.   Genitourinary: Negative for difficulty urinating, dysuria, enuresis, flank pain, frequency and hematuria.   Musculoskeletal: Negative.  Negative for arthralgias, back pain, gait problem, joint swelling, neck pain and neck stiffness.   Skin: Negative.    Allergic/Immunologic: Negative.    Neurological: Positive for tremors and weakness. Negative for dizziness, seizures, syncope, facial asymmetry, speech difficulty, light-headedness, numbness and headaches.   Hematological: Negative.  Negative for adenopathy. Does not bruise/bleed easily.   Psychiatric/Behavioral: Positive for confusion. Negative for agitation, behavioral problems, decreased concentration, dysphoric mood, self-injury and sleep disturbance. The patient is not nervous/anxious and is not hyperactive.         PEx  Temp:  [96.1 °F (35.6 °C)-98.4 °F (36.9 °C)]   Pulse:  [84-95]   Resp:  [15-18]   BP: ()/(51-62)   SpO2:  [93 %-97 %]     Intake/Output Summary (Last 24 hours) at 11/3/2018 1731  Last data filed at 11/3/2018 1200  Gross per 24 hour   Intake 1100 ml   Output 3000 ml   Net -1900 ml     Constitutional: He is oriented to person, place, and time. No distress.   HENT:   Head: Normocephalic and atraumatic.   Scleral jaundice present;  AAOx3 at the time, following commands   Eyes: EOM are normal. Right eye exhibits no discharge. Left eye exhibits no discharge. Scleral icterus is present.   Neck: Normal range of motion. Neck supple. No JVD present. No tracheal deviation present.   Cardiovascular: Normal rate and regular rhythm. Exam reveals no gallop and no friction rub.   No murmur heard.  Pulmonary/Chest: No stridor. No respiratory distress. He has no wheezes. He has rales. He exhibits no tenderness.   Mildly labored breathing  SOB, but maintaining sats on room air   Abdominal: Soft. Bowel sounds are normal. He exhibits distension. There is no tenderness. There is no rebound and no guarding. No hernia.   Musculoskeletal: Normal range of motion. He exhibits no edema, tenderness or deformity.   Lymphadenopathy:     He has no cervical adenopathy.   Neurological: He is alert and oriented to person, place, and time. He displays abnormal reflex. No cranial nerve deficit or sensory deficit. He exhibits abnormal muscle tone.   Sleepy, but follows commands appropriately   Skin: Skin is warm and dry. Capillary refill takes less than 2 seconds. He is not diaphoretic.   Jaundice          Recent Labs   Lab 11/01/18  0400 11/02/18  0452 11/03/18  0513   WBC 9.77 13.20* 10.34   HGB 10.6* 11.0* 10.3*   HCT 30.6* 31.8* 30.8*   PLT 45* 59* 50*     Recent Labs   Lab 11/01/18  2336 11/02/18  0452 11/03/18  0513    133*  133* 134*   K 4.2 4.1  4.1 4.2    101  101 102   CO2 23 22*  22* 23   BUN 59* 61*  61* 43*    CREATININE 6.2* 6.8*  6.8* 5.1*   * 119*  119* 92   CALCIUM 8.4* 8.1*  8.1* 8.6*   MG 2.1 2.2  2.2 2.0   PHOS 5.0* 5.0*  5.0* 4.1     Recent Labs   Lab 11/01/18  0400  11/01/18  2336 11/02/18  0452 11/03/18  0513   ALKPHOS 174*  --   --  184* 181*   ALT 41  --   --  35 32   AST 60*  --   --  63* 57*   ALBUMIN 2.5*  2.5*   < > 2.4* 2.4*  2.4* 2.5*   PROT 4.9*  --   --  5.0* 5.0*   BILITOT 36.2*  --   --  36.9* 35.5*   INR 2.3*  --   --  2.2* 2.1*    < > = values in this interval not displayed.      Recent Labs   Lab 11/02/18  1227 11/02/18  1713 11/02/18  2138 11/03/18  0945 11/03/18  1152 11/03/18  1647   POCTGLUCOSE 115* 146* 162* 120* 146* 160*     No results for input(s): CPK, CPKMB, MB, TROPONINI in the last 72 hours.    Scheduled Meds:   sodium chloride 0.9%   Intravenous Once    albuterol-ipratropium  3 mL Nebulization Q6H WAKE    folic acid  1 mg Oral Daily    lactulose  15 g Oral BID    lidocaine  1 patch Transdermal Q24H    lidocaine  1 patch Transdermal Q24H    midodrine  15 mg Oral TID    multivitamin  1 tablet Oral Daily    pantoprazole  40 mg Oral Daily    rifAXImin  550 mg Oral BID    sevelamer carbonate  800 mg Oral TID WM    sodium bicarbonate  1,300 mg Oral TID    thiamine  100 mg Oral Daily     Continuous Infusions:  As Needed:  sodium chloride 0.9%, acetaminophen, albumin human 25%, dextrose 50%, dextrose 50%, glucagon (human recombinant), glucose, glucose, insulin aspart U-100, ondansetron, promethazine (PHENERGAN) IVPB, ramelteon, sodium chloride 0.9%, traMADol    Active Hospital Problems    Diagnosis  POA    *Pre-transplant evaluation for liver transplant [Z01.818]  Not Applicable    Alcohol use disorder, severe, in early remission [F10.21]  Yes    Decompensated hepatic cirrhosis [K72.90]  Yes    Acute liver failure without hepatic coma [K72.00]  Yes    Alcoholic hepatitis with ascites [K70.11]  Yes    Acute liver failure [K72.00]  Yes    Jaundice [R17]  Yes     Hepatorenal syndrome [K76.7]  Yes    DEL (acute kidney injury) [N17.9]  Yes     Chronic    Acute kidney failure with lesion of tubular necrosis [N17.0]  Yes    Severe alcohol dependence [F10.20]  Yes    Coagulopathy [D68.9]  Yes    Anemia of chronic disease [D63.8]  Yes    Thrombocytopenia [D69.6]  Yes    Hyponatremia [E87.1]  Yes    Hepatic encephalopathy [K72.90]  Yes    Metabolic acidosis [E87.2]  Yes    Moderate protein malnutrition [E44.0]  Yes    Type 2 diabetes mellitus without complication [E11.9]  Yes    Acute cystitis without hematuria [N30.00]  Yes    Pleural effusion associated with hepatic disorder [K76.9, J91.8]  Yes      Resolved Hospital Problems    Diagnosis Date Resolved POA    Chronic kidney disease-mineral and bone disorder [N18.9, E83.9, M89.9] 11/02/2018 Yes    Sepsis [A41.9] 10/29/2018 Yes       Overview      Assessment and Plan for Problems addressed today:  Psychiatric   Severe alcohol dependence     - Last drink on 09/21/2018  - Unlikely to have DTs  - Continue to monitor   Renal/   DEL (acute kidney injury)     - Worsening DEL, Uremia,   - Will place dialysis line today and CRRT  - F/u w nephrology  - Avoid nephrotoxic medications  - Renally adjust medications      ID   Sepsis     - Recent hospitalization w Rx for PNA and C.diff  - Leukocytosis at 13k,               Broad coverage w vanco and cefepime              F/u cultures, deescalate as needed      Hematology   Thrombocytopenia     - Likely due to liver dz  - no sign of bleeding  - Continue to monitor      Coagulopathy     - INR 2.3 on 10/28 upon admission to ICU   Oncology   Anemia of chronic disease     - h/h stable , continue to monitor      Endocrine   Type 2 diabetes mellitus without complication     - SSI  - Hold metformin at home      GI   * Acute liver failure without hepatic coma     Summary of serologies:  MELD-Na score: 41 at 11/3/2018  5:13 AM  MELD score: 41 at 11/3/2018  5:13 AM  Calculated  from:  Serum Creatinine: 5.1 mg/dL (Rounded to 4 mg/dL) at 11/3/2018  5:13 AM  Serum Sodium: 134 mmol/L at 11/3/2018  5:13 AM  Total Bilirubin: 35.5 mg/dL at 11/3/2018  5:13 AM  INR(ratio): 2.1 at 11/3/2018  5:13 AM  Age: 53 years  --Ferritin 1044, Iron 101, Transferrin 66, TIBC 98 Iron sat 103  --Cerruloplasmin-IP  --MAYA, ASMA-IP  --IgG 2065  --Acute hepatis panel-IP  --AFP-IP  --PETH-IP  - Continue albumin, midodrine, lactulose and rifaxime  - Continue abx,   - Hepatology consulted, follow up recs   Hepatic encephalopathy     - Continue lactulose and rifaximine  - Monitor BM accordingly      Pleural effusion associated with hepatic disorder     - CXR much improved s/p drainage  - Chest tube removed on 10/31/2018   Hepatorenal syndrome     - Continue midodrine, albumin  - Maintain MAP>65      Decompensated hepatic cirrhosis     - see above       Diet: low sodium, 800 cc fluid restriction  GI PPx:   DVT PPx:    NI/SCD  Goals of Care: Full code     Discharge plan and follow up: pending liver transplant evaluation     Provider    Dante Chawla MD  Staff Hospitalist  Department of Hospital Medicine  [facility]   [phone]

## 2018-11-03 NOTE — PROGRESS NOTES
Hospital Medicine  Progress note    Team: [facility] MED  Dante Cash MD  Admit Date: 10/27/2018  GWEN 11/7/2018  Code status: Full Code    Principal Problem:  Pre-transplant evaluation for liver transplant    Interval hx: Patient seen and examined at bedside, transferred out of the ICU overnight where he was admitted for liver failure and hepatic hydrothorax. Currently on room air.     ROS     Constitutional: Positive for activity change, appetite change, chills, fatigue and unexpected weight change. Negative for diaphoresis and fever.   HENT: Negative for congestion, drooling, ear discharge, facial swelling, hearing loss, sore throat and tinnitus.    Eyes: Negative.         Icteral jaundice   Respiratory: Positive for shortness of breath. Negative for cough, choking, chest tightness and stridor.    Cardiovascular: Negative.  Negative for chest pain, palpitations and leg swelling.   Gastrointestinal: Positive for abdominal distention, diarrhea, nausea and vomiting. Negative for abdominal pain, anal bleeding, blood in stool and constipation.   Endocrine: Negative.  Negative for polydipsia, polyphagia and polyuria.   Genitourinary: Negative for difficulty urinating, dysuria, enuresis, flank pain, frequency and hematuria.   Musculoskeletal: Negative.  Negative for arthralgias, back pain, gait problem, joint swelling, neck pain and neck stiffness.   Skin: Negative.    Allergic/Immunologic: Negative.    Neurological: Positive for tremors and weakness. Negative for dizziness, seizures, syncope, facial asymmetry, speech difficulty, light-headedness, numbness and headaches.   Hematological: Negative.  Negative for adenopathy. Does not bruise/bleed easily.   Psychiatric/Behavioral: Positive for confusion. Negative for agitation, behavioral problems, decreased concentration, dysphoric mood, self-injury and sleep disturbance. The patient is not nervous/anxious and is not hyperactive.        PEx  Temp:  [96.5 °F (35.8  °C)-98.4 °F (36.9 °C)]   Pulse:  []   Resp:  [15-20]   BP: ()/(52-68)   SpO2:  [92 %-96 %]     Intake/Output Summary (Last 24 hours) at 11/2/2018 2031  Last data filed at 11/2/2018 0600  Gross per 24 hour   Intake 500 ml   Output --   Net 500 ml     Constitutional: He is oriented to person, place, and time. No distress.   HENT:   Head: Normocephalic and atraumatic.   Scleral jaundice present;  AAOx3 at the time, following commands   Eyes: EOM are normal. Right eye exhibits no discharge. Left eye exhibits no discharge. Scleral icterus is present.   Neck: Normal range of motion. Neck supple. No JVD present. No tracheal deviation present.   Cardiovascular: Normal rate and regular rhythm. Exam reveals no gallop and no friction rub.   No murmur heard.  Pulmonary/Chest: No stridor. No respiratory distress. He has no wheezes. He has rales. He exhibits no tenderness.   Mildly labored breathing  SOB, but maintaining sats on room air   Abdominal: Soft. Bowel sounds are normal. He exhibits distension. There is no tenderness. There is no rebound and no guarding. No hernia.   Musculoskeletal: Normal range of motion. He exhibits no edema, tenderness or deformity.   Lymphadenopathy:     He has no cervical adenopathy.   Neurological: He is alert and oriented to person, place, and time. He displays abnormal reflex. No cranial nerve deficit or sensory deficit. He exhibits abnormal muscle tone.   Sleepy, but follows commands appropriately   Skin: Skin is warm and dry. Capillary refill takes less than 2 seconds. He is not diaphoretic.   Jaundice          Recent Labs   Lab 10/31/18  0358 11/01/18  0400 11/02/18  0452   WBC 11.07 9.77 13.20*   HGB 11.5* 10.6* 11.0*   HCT 32.8* 30.6* 31.8*   PLT 61* 45* 59*     Recent Labs   Lab 11/01/18  1324 11/01/18  2336 11/02/18  0452   * 137 133*  133*   K 4.1 4.2 4.1  4.1    103 101  101   CO2 21* 23 22*  22*   BUN 50* 59* 61*  61*   CREATININE 5.4* 6.2* 6.8*  6.8*    * 128* 119*  119*   CALCIUM 7.9* 8.4* 8.1*  8.1*   MG 2.2 2.1 2.2  2.2   PHOS 4.3 5.0* 5.0*  5.0*     Recent Labs   Lab 10/31/18  0358  11/01/18  0400 11/01/18  1324 11/01/18  2336 11/02/18  0452   ALKPHOS 208*  --  174*  --   --  184*   ALT 55*  --  41  --   --  35   AST 80*  --  60*  --   --  63*   ALBUMIN 2.2*  2.2*   < > 2.5*  2.5* 2.6* 2.4* 2.4*  2.4*   PROT 5.1*  --  4.9*  --   --  5.0*   BILITOT 37.9*  --  36.2*  --   --  36.9*   INR 2.3*  --  2.3*  --   --  2.2*    < > = values in this interval not displayed.      Recent Labs   Lab 11/01/18  1319 11/01/18  1651 11/01/18  2324 11/02/18  0832 11/02/18  1227 11/02/18  1713   POCTGLUCOSE 142* 135* 121* 129* 115* 146*     No results for input(s): CPK, CPKMB, MB, TROPONINI in the last 72 hours.    Scheduled Meds:   sodium chloride 0.9%   Intravenous Once    albuterol-ipratropium  3 mL Nebulization Q6H WAKE    ceFEPime (MAXIPIME) IVPB  1 g Intravenous Q24H    folic acid  1 mg Oral Daily    lactulose  15 g Oral BID    lidocaine  1 patch Transdermal Q24H    lidocaine  1 patch Transdermal Q24H    midodrine  15 mg Oral TID    multivitamin  1 tablet Oral Daily    pantoprazole  40 mg Oral Daily    rifAXImin  550 mg Oral BID    sevelamer carbonate  800 mg Oral TID WM    sodium bicarbonate  1,300 mg Oral TID    thiamine  100 mg Oral Daily     Continuous Infusions:  As Needed:  sodium chloride 0.9%, acetaminophen, dextrose 50%, dextrose 50%, glucagon (human recombinant), glucose, glucose, insulin aspart U-100, ondansetron, promethazine (PHENERGAN) IVPB, ramelteon, sodium chloride 0.9%, traMADol    Active Hospital Problems    Diagnosis  POA    *Pre-transplant evaluation for liver transplant [Z01.818]  Not Applicable    Alcohol use disorder, severe, in early remission [F10.21]  Yes    Decompensated hepatic cirrhosis [K72.90]  Yes    Acute liver failure without hepatic coma [K72.00]  Yes    Alcoholic hepatitis with ascites [K70.11]  Yes     Acute liver failure [K72.00]  Yes    Jaundice [R17]  Yes    Hepatorenal syndrome [K76.7]  Yes    DEL (acute kidney injury) [N17.9]  Yes     Chronic    ATN (acute tubular necrosis) [N17.0]  Yes    Severe alcohol dependence [F10.20]  Yes    Coagulopathy [D68.9]  Yes    Anemia of chronic disease [D63.8]  Yes    Thrombocytopenia [D69.6]  Yes    Hyponatremia [E87.1]  Yes    Hepatic encephalopathy [K72.90]  Yes    Metabolic acidosis [E87.2]  Yes    Moderate protein malnutrition [E44.0]  Yes    Type 2 diabetes mellitus without complication [E11.9]  Yes    Acute cystitis without hematuria [N30.00]  Yes    Pleural effusion associated with hepatic disorder [K76.9, J91.8]  Yes      Resolved Hospital Problems    Diagnosis Date Resolved POA    Chronic kidney disease-mineral and bone disorder [N18.9, E83.9, M89.9] 11/02/2018 Yes    Sepsis [A41.9] 10/29/2018 Yes       Overview      Assessment and Plan for Problems addressed today:  Psychiatric   Severe alcohol dependence     - Last drink on 09/21/2018  - Unlikely to have DTs  - Continue to monitor      Renal/   DEL (acute kidney injury)     - Worsening DEL, Uremia,               Will place dialysis line today and CRRT              F/u w nephrology  - Avoid nephrotoxic medications  - Renally adjust medications      ID   Sepsis     - Recent hospitalization w Rx for PNA and C.diff  - Leukocytosis at 13k,               Broad coverage w vanco and cefepime              F/u cultures, deescalate as needed      Hematology   Thrombocytopenia     - Likely due to liver dz  - no sign of bleeding  - Continue to monitor      Coagulopathy     - INR 2.3 on 10/28 upon admission to ICU              No signs of bleeding              Need thoracentesis and HD cath placed today, will transfuse 1u FFP      Oncology   Anemia of chronic disease     - h/h stable , continue to monitor      Endocrine   Type 2 diabetes mellitus without complication     - SSI  - Hold metformin at home       GI   * Acute liver failure without hepatic coma     Summary of serologies:  --Ferritin 1044, Iron 101, Transferrin 66, TIBC 98 Iron sat 103  --Cerruloplasmin-IP  --MAYA, ASMA-IP  --IgG 2065  --Acute hepatis panel-IP  --AFP-IP  --PETH-IP     - Continue albumin, midodrine, lactulose and rifaxime  - Continue abx,   - Hepatology consulted, follow up recs:     Recommendations:  --Daily CMP, CBC, and INR  --F/U Blood and urine cultures, small ascites on US  --Continue antibiotics  --Continue HRS protocol  --Continue lactulose and rifaximin  --Continue folic acid and thiamine   --Nephrology consult  --Addiction psych consult  --Nutrition consult  --PT and OT             Hepatic encephalopathy     - Continue lactulose and rifaximine  - Monitor BM accordingly      Pleural effusion associated with hepatic disorder     - Will obtain thoracentesis today after FFP and send fluid for analysis      Hepatorenal syndrome     - Continue midodrine, albumin  - Maintain MAP>65      Decompensated hepatic cirrhosis     - see above       Diet: low sodium, 800 cc fluid restriction  GI PPx:   DVT PPx:    NI/SCD  Goals of Care: Full code     Discharge plan and follow up: pending liver transplant evaluation    Provider    Dante Chawla MD  Staff Hospitalist  Department of Hospital Medicine  [facility]   [phone]

## 2018-11-03 NOTE — CONSULTS
"  [facility]  Adult Nutrition  Consult Note    SUMMARY     Recommendations    Recommendation/Intervention:   Continue Low Sodium diet. Liver transplant workup- nutrition education on low sodium diet provided. Pt  verbalized understanding.     RD to monitor.    Goals: PO intake >50%  Nutrition Goal Status: progressing towards goal  Communication of RD Recs: reviewed with RN    Reason for Assessment    Reason for Assessment: consult(liver transplant workup)  Diagnosis: other (see comments)(Liver fx)  Relevant Medical History: Cirrhosis, Etoh abuse  Interdisciplinary Rounds: did not attend  General Information Comments: RD consulted for liver transplant workup. Low Sodium diet education provided to pt. Family member at bedside. Pt introduced to post transplant guidelines. NFPE completed on 10/31- exam remains unchanged. Pt with moderate malnutrition. Consuming Novasource Renal and eating approx 25% of meals.  Nutrition Discharge Planning: Adequate PO intake    Nutrition Risk Screen    Nutrition Risk Screen: no indicators present    Nutrition/Diet History    Patient Reported Diet/Restrictions/Preferences: general  Do you have any cultural, spiritual, Adventist conflicts, given your current situation?: none stated  Factors Affecting Nutritional Intake: decreased appetite, early satiety    Anthropometrics    Temp: 96.1 °F (35.6 °C)  Height Method: Stated  Height: 5' 10" (177.8 cm)  Height (inches): 70 in  Weight Method: Bed Scale  Weight: 84.6 kg (186 lb 8.2 oz)  Weight (lb): 186.51 lb  Ideal Body Weight (IBW), Male: 166 lb  % Ideal Body Weight, Male (lb): 109.7 lb  BMI (Calculated): 26.2  BMI Grade: 25 - 29.9 - overweight  Usual Body Weight (UBW), k.5 kg  % Usual Body Weight: 86.67  % Weight Change From Usual Weight: -13.51 %       Lab/Procedures/Meds    Pertinent Labs Reviewed: reviewed  Pertinent Labs Comments: Na 134, BUN 43, Cr 5.1  Pertinent Medications Reviewed: reviewed  Pertinent " Medications Comments: lactulose, folic acid, thiamine, albumin    Physical Findings/Assessment    Overall Physical Appearance: loss of muscle mass, loss of subcutaneous fat  Oral/Mouth Cavity: WDL  Skin: jaundice, edema    Estimated/Assessed Needs    Weight Used For Calorie Calculations: 83.7 kg (184 lb 8.4 oz)(Dosing wt)  Energy Calorie Requirements (kcal): 2110 kcal/d  Energy Need Method: Troup-St Jeor(1.25 PAL)  Protein Requirements: 109 g/d (1.3 g/kg)  Weight Used For Protein Calculations: 83.7 kg (184 lb 8.4 oz)     Fluid Need Method: other (see comments)(Per MD or 1 mL/kcal)  RDA Method (mL): 2110         Nutrition Prescription Ordered    Current Diet Order: Low Sodium  Nutrition Order Comments: 800mL FR  Oral Nutrition Supplement: Novasource ONS    Evaluation of Received Nutrient/Fluid Intake    Comments: LBM 11/2  Tolerance: tolerating  % Intake of Estimated Energy Needs: 25 - 50 %  % Meal Intake: 25 - 50 %    Nutrition Risk    Level of Risk/Frequency of Follow-up: (1x/week)     Assessment and Plan    Moderate protein malnutrition    Malnutrition in the context of Acute Illness/Injury    Related to (etiology):  Cirrhosis, poor appetite    Signs and Symptoms (as evidenced by):  Energy Intake: <75% of estimated energy requirement for 2 months  Body Fat Depletion: moderate depletion of orbitals and triceps   Muscle Mass Depletion: moderate depletion of temples, clavicle region and scapular region   Weight Loss: 14% x 2 months    Nutrition Diagnosis Status:  Continues              Monitor and Evaluation    Food and Nutrient Intake: energy intake, food and beverage intake  Food and Nutrient Adminstration: diet order  Physical Activity and Function: nutrition-related ADLs and IADLs  Anthropometric Measurements: weight, weight change  Biochemical Data, Medical Tests and Procedures: lipid profile, inflammatory profile, glucose/endocrine profile, gastrointestinal profile, electrolyte and renal  panel  Nutrition-Focused Physical Findings: overall appearance     Nutrition Follow-Up    RD Follow-up?: Yes

## 2018-11-03 NOTE — PROGRESS NOTES
Hemodialysis:    Patient is due for dialysis treatment tonight but blood pressure is on the low side. Patient has Midodrine due at 9pm.  Instructed primary nurse to give Midodrine now and update dialysis unit once patient's vitals improve.

## 2018-11-03 NOTE — PROGRESS NOTES
[facility] Patient is awake, alert, oriented, on room air, with right IJtirlaysis, with good flow on both arterial and venous ports but positional.    HD started. 25% Human Albumin 100ml given IV for BP support

## 2018-11-03 NOTE — PROGRESS NOTES
3 1/2 hour- hemodialysis completed, patient tolerated well, blood returned, right IJ trialysis flushed and locked with Normal Saline,capped and secured.    NET UF REMOVED: 2.5 liters  Report given to primary nurse

## 2018-11-04 LAB
ALBUMIN SERPL BCP-MCNC: 2.4 G/DL
ALP SERPL-CCNC: 211 U/L
ALT SERPL W/O P-5'-P-CCNC: 33 U/L
ANION GAP SERPL CALC-SCNC: 11 MMOL/L
AST SERPL-CCNC: 72 U/L
BASOPHILS # BLD AUTO: 0.09 K/UL
BASOPHILS NFR BLD: 0.8 %
BILIRUB SERPL-MCNC: 33.4 MG/DL
BUN SERPL-MCNC: 64 MG/DL
CALCIUM SERPL-MCNC: 8.5 MG/DL
CHLORIDE SERPL-SCNC: 99 MMOL/L
CO2 SERPL-SCNC: 23 MMOL/L
CREAT SERPL-MCNC: 7 MG/DL
DIFFERENTIAL METHOD: ABNORMAL
EOSINOPHIL # BLD AUTO: 0.6 K/UL
EOSINOPHIL NFR BLD: 5 %
ERYTHROCYTE [DISTWIDTH] IN BLOOD BY AUTOMATED COUNT: 19.4 %
EST. GFR  (AFRICAN AMERICAN): 9.4 ML/MIN/1.73 M^2
EST. GFR  (NON AFRICAN AMERICAN): 8.1 ML/MIN/1.73 M^2
GLUCOSE SERPL-MCNC: 88 MG/DL
HCT VFR BLD AUTO: 30.6 %
HGB BLD-MCNC: 10.4 G/DL
IMM GRANULOCYTES # BLD AUTO: 0.15 K/UL
IMM GRANULOCYTES NFR BLD AUTO: 1.3 %
INR PPP: 2
LYMPHOCYTES # BLD AUTO: 1.2 K/UL
LYMPHOCYTES NFR BLD: 10.2 %
MAGNESIUM SERPL-MCNC: 2.2 MG/DL
MCH RBC QN AUTO: 32.9 PG
MCHC RBC AUTO-ENTMCNC: 34 G/DL
MCV RBC AUTO: 97 FL
MONOCYTES # BLD AUTO: 1.6 K/UL
MONOCYTES NFR BLD: 13.4 %
NEUTROPHILS # BLD AUTO: 8.1 K/UL
NEUTROPHILS NFR BLD: 69.3 %
NRBC BLD-RTO: 0 /100 WBC
PHOSPHATE SERPL-MCNC: 5 MG/DL
PLATELET # BLD AUTO: 52 K/UL
PMV BLD AUTO: 12.2 FL
POCT GLUCOSE: 136 MG/DL (ref 70–110)
POCT GLUCOSE: 155 MG/DL (ref 70–110)
POCT GLUCOSE: 93 MG/DL (ref 70–110)
POCT GLUCOSE: 99 MG/DL (ref 70–110)
POTASSIUM SERPL-SCNC: 4.4 MMOL/L
PROT SERPL-MCNC: 5.1 G/DL
PROTHROMBIN TIME: 19.1 SEC
RBC # BLD AUTO: 3.16 M/UL
SODIUM SERPL-SCNC: 133 MMOL/L
TB INDURATION 48 - 72 HR READ: 0 MM
WBC # BLD AUTO: 11.75 K/UL

## 2018-11-04 PROCEDURE — 94761 N-INVAS EAR/PLS OXIMETRY MLT: CPT | Mod: NTX

## 2018-11-04 PROCEDURE — 25000003 PHARM REV CODE 250: Mod: NTX | Performed by: HOSPITALIST

## 2018-11-04 PROCEDURE — 25000003 PHARM REV CODE 250: Mod: NTX | Performed by: STUDENT IN AN ORGANIZED HEALTH CARE EDUCATION/TRAINING PROGRAM

## 2018-11-04 PROCEDURE — 94640 AIRWAY INHALATION TREATMENT: CPT | Mod: NTX

## 2018-11-04 PROCEDURE — 83735 ASSAY OF MAGNESIUM: CPT | Mod: NTX

## 2018-11-04 PROCEDURE — 99232 PR SUBSEQUENT HOSPITAL CARE,LEVL II: ICD-10-PCS | Mod: NTX,,, | Performed by: INTERNAL MEDICINE

## 2018-11-04 PROCEDURE — 84100 ASSAY OF PHOSPHORUS: CPT | Mod: NTX

## 2018-11-04 PROCEDURE — 85610 PROTHROMBIN TIME: CPT | Mod: NTX

## 2018-11-04 PROCEDURE — 25000003 PHARM REV CODE 250: Mod: NTX | Performed by: INTERNAL MEDICINE

## 2018-11-04 PROCEDURE — 20600001 HC STEP DOWN PRIVATE ROOM: Mod: NTX

## 2018-11-04 PROCEDURE — 99232 SBSQ HOSP IP/OBS MODERATE 35: CPT | Mod: NTX,,, | Performed by: INTERNAL MEDICINE

## 2018-11-04 PROCEDURE — 99233 SBSQ HOSP IP/OBS HIGH 50: CPT | Mod: NTX,,, | Performed by: HOSPITALIST

## 2018-11-04 PROCEDURE — 25000003 PHARM REV CODE 250: Mod: NTX | Performed by: PHYSICIAN ASSISTANT

## 2018-11-04 PROCEDURE — 63600175 PHARM REV CODE 636 W HCPCS: Mod: NTX | Performed by: HOSPITALIST

## 2018-11-04 PROCEDURE — 80053 COMPREHEN METABOLIC PANEL: CPT | Mod: NTX

## 2018-11-04 PROCEDURE — 85025 COMPLETE CBC W/AUTO DIFF WBC: CPT | Mod: NTX

## 2018-11-04 PROCEDURE — 99233 PR SUBSEQUENT HOSPITAL CARE,LEVL III: ICD-10-PCS | Mod: NTX,,, | Performed by: HOSPITALIST

## 2018-11-04 PROCEDURE — 25000242 PHARM REV CODE 250 ALT 637 W/ HCPCS: Mod: NTX | Performed by: HOSPITALIST

## 2018-11-04 RX ORDER — SODIUM CHLORIDE 9 MG/ML
INJECTION, SOLUTION INTRAVENOUS
Status: DISCONTINUED | OUTPATIENT
Start: 2018-11-04 | End: 2018-11-06

## 2018-11-04 RX ORDER — SODIUM CHLORIDE 9 MG/ML
INJECTION, SOLUTION INTRAVENOUS ONCE
Status: DISCONTINUED | OUTPATIENT
Start: 2018-11-04 | End: 2018-11-05

## 2018-11-04 RX ADMIN — SODIUM BICARBONATE 650 MG TABLET 1300 MG: at 03:11

## 2018-11-04 RX ADMIN — RIFAXIMIN 550 MG: 550 TABLET ORAL at 08:11

## 2018-11-04 RX ADMIN — THERA TABS 1 TABLET: TAB at 10:11

## 2018-11-04 RX ADMIN — IPRATROPIUM BROMIDE AND ALBUTEROL SULFATE 3 ML: .5; 3 SOLUTION RESPIRATORY (INHALATION) at 07:11

## 2018-11-04 RX ADMIN — IPRATROPIUM BROMIDE AND ALBUTEROL SULFATE 3 ML: .5; 3 SOLUTION RESPIRATORY (INHALATION) at 08:11

## 2018-11-04 RX ADMIN — SODIUM BICARBONATE 650 MG TABLET 1300 MG: at 10:11

## 2018-11-04 RX ADMIN — SODIUM BICARBONATE 650 MG TABLET 1300 MG: at 08:11

## 2018-11-04 RX ADMIN — SEVELAMER CARBONATE 800 MG: 800 TABLET, FILM COATED ORAL at 10:11

## 2018-11-04 RX ADMIN — IPRATROPIUM BROMIDE AND ALBUTEROL SULFATE 3 ML: .5; 3 SOLUTION RESPIRATORY (INHALATION) at 01:11

## 2018-11-04 RX ADMIN — SEVELAMER CARBONATE 800 MG: 800 TABLET, FILM COATED ORAL at 04:11

## 2018-11-04 RX ADMIN — PANTOPRAZOLE SODIUM 40 MG: 40 TABLET, DELAYED RELEASE ORAL at 10:11

## 2018-11-04 RX ADMIN — MIDODRINE HYDROCHLORIDE 15 MG: 5 TABLET ORAL at 03:11

## 2018-11-04 RX ADMIN — MIDODRINE HYDROCHLORIDE 15 MG: 5 TABLET ORAL at 08:11

## 2018-11-04 RX ADMIN — Medication 100 MG: at 10:11

## 2018-11-04 RX ADMIN — LACTULOSE 15 G: 20 SOLUTION ORAL at 10:11

## 2018-11-04 RX ADMIN — MIDODRINE HYDROCHLORIDE 15 MG: 5 TABLET ORAL at 10:11

## 2018-11-04 RX ADMIN — RIFAXIMIN 550 MG: 550 TABLET ORAL at 10:11

## 2018-11-04 RX ADMIN — LIDOCAINE 1 PATCH: 50 PATCH CUTANEOUS at 08:11

## 2018-11-04 RX ADMIN — RAMELTEON 8 MG: 8 TABLET, FILM COATED ORAL at 01:11

## 2018-11-04 RX ADMIN — PROMETHAZINE HYDROCHLORIDE 12.5 MG: 25 INJECTION INTRAMUSCULAR; INTRAVENOUS at 02:11

## 2018-11-04 RX ADMIN — FOLIC ACID 1 MG: 1 TABLET ORAL at 10:11

## 2018-11-04 NOTE — PROGRESS NOTES
Hospital Medicine  Progress note    Team: [facility] HOSP MED  Dante Cash MD  Admit Date: 10/27/2018  GWEN 11/7/2018  Code status: Full Code    Principal Problem:  Pre-transplant evaluation for liver transplant    Interval hx: Patient seen and examined at bedside, overnight had several episodes of nausea and emesis, now resolved. Abdominal xray and US abdomen ordered.     ROS     Constitutional: Positive for activity change, appetite change, chills, fatigue and unexpected weight change. Negative for diaphoresis and fever.   HENT: Negative for congestion, drooling, ear discharge, facial swelling, hearing loss, sore throat and tinnitus.    Eyes: Negative.         Icteral jaundice   Respiratory: Positive for shortness of breath. Negative for cough, choking, chest tightness and stridor.    Cardiovascular: Negative.  Negative for chest pain, palpitations and leg swelling.   Gastrointestinal: Positive for abdominal distention, diarrhea, nausea and vomiting. Negative for abdominal pain, anal bleeding, blood in stool and constipation.   Endocrine: Negative.  Negative for polydipsia, polyphagia and polyuria.   Genitourinary: Negative for difficulty urinating, dysuria, enuresis, flank pain, frequency and hematuria.   Musculoskeletal: Negative.  Negative for arthralgias, back pain, gait problem, joint swelling, neck pain and neck stiffness.   Skin: Negative.    Allergic/Immunologic: Negative.    Neurological: Positive for tremors and weakness. Negative for dizziness, seizures, syncope, facial asymmetry, speech difficulty, light-headedness, numbness and headaches.   Hematological: Negative.  Negative for adenopathy. Does not bruise/bleed easily.   Psychiatric/Behavioral: Positive for confusion. Negative for agitation, behavioral problems, decreased concentration, dysphoric mood, self-injury and sleep disturbance. The patient is not nervous/anxious and is not hyperactive.        PEx  Temp:  [97.4 °F (36.3 °C)-98.4 °F (36.9 °C)]    Pulse:  [84-96]   Resp:  [16-28]   BP: ()/(54-62)   SpO2:  [92 %-99 %]     Intake/Output Summary (Last 24 hours) at 11/4/2018 1521  Last data filed at 11/4/2018 1500  Gross per 24 hour   Intake 500 ml   Output 200 ml   Net 300 ml     Constitutional: He is oriented to person, place, and time. No distress.   HENT:   Head: Normocephalic and atraumatic.   Scleral jaundice present;  AAOx3 at the time, following commands   Eyes: EOM are normal. Right eye exhibits no discharge. Left eye exhibits no discharge. Scleral icterus is present.   Neck: Normal range of motion. Neck supple. No JVD present. No tracheal deviation present.   Cardiovascular: Normal rate and regular rhythm. Exam reveals no gallop and no friction rub.   No murmur heard.  Pulmonary/Chest: No stridor. No respiratory distress. He has no wheezes. He has rales. He exhibits no tenderness.   Mildly labored breathing  SOB, but maintaining sats on room air   Abdominal: Soft. Bowel sounds are normal. He exhibits distension. There is no tenderness. There is no rebound and no guarding. No hernia.   Musculoskeletal: Normal range of motion. He exhibits no edema, tenderness or deformity.   Lymphadenopathy:     He has no cervical adenopathy.   Neurological: He is alert and oriented to person, place, and time. He displays abnormal reflex. No cranial nerve deficit or sensory deficit. He exhibits abnormal muscle tone.   Sleepy, but follows commands appropriately   Skin: Skin is warm and dry. Capillary refill takes less than 2 seconds. He is not diaphoretic.   Jaundice          Recent Labs   Lab 11/02/18 0452 11/03/18  0513 11/04/18  0424   WBC 13.20* 10.34 11.75   HGB 11.0* 10.3* 10.4*   HCT 31.8* 30.8* 30.6*   PLT 59* 50* 52*     Recent Labs   Lab 11/02/18 0452 11/03/18  0513 11/04/18  0424   *  133* 134* 133*   K 4.1  4.1 4.2 4.4     101 102 99   CO2 22*  22* 23 23   BUN 61*  61* 43* 64*   CREATININE 6.8*  6.8* 5.1* 7.0*   *  119* 92 88    CALCIUM 8.1*  8.1* 8.6* 8.5*   MG 2.2  2.2 2.0 2.2   PHOS 5.0*  5.0* 4.1 5.0*     Recent Labs   Lab 11/02/18  0452 11/03/18  0513 11/04/18  0424   ALKPHOS 184* 181* 211*   ALT 35 32 33   AST 63* 57* 72*   ALBUMIN 2.4*  2.4* 2.5* 2.4*   PROT 5.0* 5.0* 5.1*   BILITOT 36.9* 35.5* 33.4*   INR 2.2* 2.1* 2.0*      Recent Labs   Lab 11/03/18  0945 11/03/18  1152 11/03/18  1647 11/03/18  2119 11/04/18  0734 11/04/18  1129   POCTGLUCOSE 120* 146* 160* 148* 93 99     No results for input(s): CPK, CPKMB, MB, TROPONINI in the last 72 hours.    Scheduled Meds:   sodium chloride 0.9%   Intravenous Once    sodium chloride 0.9%   Intravenous Once    albuterol-ipratropium  3 mL Nebulization Q6H WAKE    folic acid  1 mg Oral Daily    lactulose  15 g Oral BID    lidocaine  1 patch Transdermal Q24H    lidocaine  1 patch Transdermal Q24H    midodrine  15 mg Oral TID    multivitamin  1 tablet Oral Daily    pantoprazole  40 mg Oral Daily    rifAXImin  550 mg Oral BID    sevelamer carbonate  800 mg Oral TID WM    sodium bicarbonate  1,300 mg Oral TID    thiamine  100 mg Oral Daily     Continuous Infusions:  As Needed:  sodium chloride 0.9%, sodium chloride 0.9%, acetaminophen, albumin human 25%, dextrose 50%, dextrose 50%, glucagon (human recombinant), glucose, glucose, insulin aspart U-100, ondansetron, promethazine (PHENERGAN) IVPB, promethazine (PHENERGAN) IVPB, ramelteon, sodium chloride 0.9%, traMADol    Active Hospital Problems    Diagnosis  POA    *Pre-transplant evaluation for liver transplant [Z01.818]  Not Applicable    Alcohol use disorder, severe, in early remission [F10.21]  Yes    Decompensated hepatic cirrhosis [K72.90]  Yes    Acute liver failure without hepatic coma [K72.00]  Yes    Alcoholic hepatitis with ascites [K70.11]  Yes    Acute liver failure [K72.00]  Yes    Jaundice [R17]  Yes    Hepatorenal syndrome [K76.7]  Yes    DEL (acute kidney injury) [N17.9]  Yes     Chronic    Acute kidney  failure with lesion of tubular necrosis [N17.0]  Yes    Severe alcohol dependence [F10.20]  Yes    Coagulopathy [D68.9]  Yes    Anemia of chronic disease [D63.8]  Yes    Thrombocytopenia [D69.6]  Yes    Hyponatremia [E87.1]  Yes    Hepatic encephalopathy [K72.90]  Yes    Metabolic acidosis [E87.2]  Yes    Moderate protein malnutrition [E44.0]  Yes    Type 2 diabetes mellitus without complication [E11.9]  Yes    Acute cystitis without hematuria [N30.00]  Yes    Pleural effusion associated with hepatic disorder [K76.9, J91.8]  Yes      Resolved Hospital Problems    Diagnosis Date Resolved POA    Chronic kidney disease-mineral and bone disorder [N18.9, E83.9, M89.9] 11/02/2018 Yes    Sepsis [A41.9] 10/29/2018 Yes       Overview      Assessment and Plan for Problems addressed today:  Psychiatric   Severe alcohol dependence     - Last drink on 09/21/2018  - Unlikely to have DTs  - Continue to monitor   Renal/   DEL (acute kidney injury)     - Worsening DEL, Uremia,   - Will place dialysis line today and CRRT  - F/u w nephrology  - Avoid nephrotoxic medications  - Renally adjust medications      ID   Sepsis     - Recent hospitalization w Rx for PNA and C.diff  - Leukocytosis at 13k,               Broad coverage w vanco and cefepime              F/u cultures, deescalate as needed      Hematology   Thrombocytopenia     - Likely due to liver dz  - no sign of bleeding  - Continue to monitor      Coagulopathy     - INR 2.3 on 10/28 upon admission to ICU  - now 2.0   Oncology   Anemia of chronic disease     - h/h stable , continue to monitor      Endocrine   Type 2 diabetes mellitus without complication     - SSI  - Hold metformin at home      GI   * Acute liver failure without hepatic coma       MELD-Na score: 41 at 11/4/2018  4:24 AM  MELD score: 41 at 11/4/2018  4:24 AM  Calculated from:  Serum Creatinine: 7 mg/dL (Rounded to 4 mg/dL) at 11/4/2018  4:24 AM  Serum Sodium: 133 mmol/L at 11/4/2018  4:24 AM  Total  Bilirubin: 33.4 mg/dL at 11/4/2018  4:24 AM  INR(ratio): 2 at 11/4/2018  4:24 AM  Age: 53 years  Summary of serologies:  --Iron 101, Transferrin 66, TIBC 98 Iron sat 103, Ferritin 1044,   --Cerruloplasmin-IP  --MAYA, ASMA-IP  --IgG 2065  --Acute hepatis panel-IP  --AFP-IP  --PETH-IP  - Continue albumin, midodrine, lactulose and rifaxime  - Continue abx,   - Hepatology consulted, follow up recs   Hepatic encephalopathy     - Continue lactulose and rifaximine  - Monitor BM accordingly      Pleural effusion associated with hepatic disorder     - CXR much improved s/p drainage  - Chest tube removed on 10/31/2018   Hepatorenal syndrome     - Continue midodrine, albumin  - Maintain MAP>65      Decompensated hepatic cirrhosis     - see above       Diet: low sodium, 800 cc fluid restriction  GI PPx:   DVT PPx:    NI/SCD  Goals of Care: Full code     Discharge plan and follow up: pending liver transplant evaluation     Provider    Dante Chawla MD  Staff Hospitalist  Department of Hospital Medicine  [facility]   [phone]

## 2018-11-04 NOTE — SUBJECTIVE & OBJECTIVE
Interval History: Reports some nausea overnight and this morning. Has some abdominal cramping. Denies any confusion, fevers, chills. Reports poor PO intake.    Current Facility-Administered Medications   Medication    0.9%  NaCl infusion    0.9%  NaCl infusion    acetaminophen tablet 650 mg    albumin human 25% bottle 25 g    albuterol-ipratropium 2.5 mg-0.5 mg/3 mL nebulizer solution 3 mL    dextrose 50% injection 12.5 g    dextrose 50% injection 25 g    folic acid tablet 1 mg    glucagon (human recombinant) injection 1 mg    glucose chewable tablet 16 g    glucose chewable tablet 24 g    insulin aspart U-100 pen 0-5 Units    lactulose 20 gram/30 mL solution Soln 15 g    lidocaine 5 % patch 1 patch    lidocaine 5 % patch 1 patch    midodrine tablet 15 mg    multivitamin tablet 1 tablet    ondansetron disintegrating tablet 8 mg    pantoprazole EC tablet 40 mg    promethazine (PHENERGAN) 12.5 mg in dextrose 5 % 50 mL IVPB    ramelteon tablet 8 mg    rifAXIMin tablet 550 mg    sevelamer carbonate tablet 800 mg    sodium bicarbonate tablet 1,300 mg    sodium chloride 0.9% flush 5 mL    thiamine tablet 100 mg    traMADol tablet 50 mg       Objective:     Vital Signs (Most Recent):  Temp: 98.3 °F (36.8 °C) (11/04/18 0749)  Pulse: 86 (11/04/18 0749)  Resp: (!) 28 (11/04/18 0749)  BP: (!) 99/54 (11/04/18 0749)  SpO2: 99 % (11/04/18 0749) Vital Signs (24h Range):  Temp:  [97.5 °F (36.4 °C)-98.4 °F (36.9 °C)] 98.3 °F (36.8 °C)  Pulse:  [84-91] 86  Resp:  [16-28] 28  SpO2:  [92 %-99 %] 99 %  BP: ()/(52-62) 99/54     Weight: 81.6 kg (179 lb 14.3 oz) (11/04/18 0400)  Body mass index is 25.81 kg/m².    Physical Exam   Constitutional: He is oriented to person, place, and time.   Chronically ill-appearing   Eyes: Scleral icterus is present.   Cardiovascular: Normal rate and regular rhythm.   Pulmonary/Chest: Effort normal and breath sounds normal.   Abdominal: Soft. He exhibits no distension. There  is no tenderness.   Musculoskeletal: He exhibits no edema or deformity.   Neurological: He is alert and oriented to person, place, and time.   Skin: Skin is warm and dry.   Vitals reviewed.      MELD-Na score: 41 at 11/4/2018  4:24 AM  MELD score: 41 at 11/4/2018  4:24 AM  Calculated from:  Serum Creatinine: 7 mg/dL (Rounded to 4 mg/dL) at 11/4/2018  4:24 AM  Serum Sodium: 133 mmol/L at 11/4/2018  4:24 AM  Total Bilirubin: 33.4 mg/dL at 11/4/2018  4:24 AM  INR(ratio): 2 at 11/4/2018  4:24 AM  Age: 53 years    Significant Labs:  CBC:   Recent Labs   Lab 11/04/18 0424   WBC 11.75   RBC 3.16*   HGB 10.4*   HCT 30.6*   PLT 52*     CMP:   Recent Labs   Lab 11/04/18 0424   GLU 88   CALCIUM 8.5*   ALBUMIN 2.4*   PROT 5.1*   *   K 4.4   CO2 23   CL 99   BUN 64*   CREATININE 7.0*   ALKPHOS 211*   ALT 33   AST 72*   BILITOT 33.4*     Coagulation:   Recent Labs   Lab 11/04/18 0424   INR 2.0*

## 2018-11-04 NOTE — PLAN OF CARE
Problem: Patient Care Overview  Goal: Plan of Care Review  Outcome: Ongoing (interventions implemented as appropriate)  Plan of care reviewed with patient and wife. More lethargic today. Denies nausea and abdominal pain, no vomiting. Hypotensive, MD aware. Scheduled for dialysis today. Appetite continues poor. Blood glucose every ac meals. No falls. Will continue to monitor

## 2018-11-04 NOTE — ASSESSMENT & PLAN NOTE
53 year old male with a history of HTN, HLD, DM Type 2, Depression, and Alcohol Dependence on who Hepatology is being consulted decompensated alcoholic cirrhosis and liver transplant evaluation. Patient with decompensated alcohol cirrhosis and a MELD of 42 therefore liver transplant would be the only live saving/curative measure. Of note patient with strong history of alcohol abuse and multiple complications at this time (UTI, uremia, HE, etc). Patient/family understand that although patient needs a liver he does need to undergo a liver transplant evaluation prior to selection committee and we are unsure how things will progress over the next couple of days due to how sick he is.     Addiction psych deemed patient moderate risk for relapse. He is undergoing inpatient transplant eval. He has a high MELD of 41.    Recommendations:  --Daily CMP, CBC, and INR  --Continue antibiotics  --Continue HRS protocol  -- HD per nephrology  --Continue lactulose and rifaximin  --Continue folic acid and thiamine   --Nutrition consult  --PT and OT   -- Undergoing testing for transplant eval

## 2018-11-04 NOTE — PLAN OF CARE
Problem: Patient Care Overview  Goal: Plan of Care Review  Outcome: Ongoing (interventions implemented as appropriate)  AAO x4, c/o frequent nausea  with emesis x1: nose bleed with vomiting x1  Bleeding ceased after 1minute : prn zofran & phenergan admin: decrease appetite  : restless  Oral sleep aide : assisted x 1 ambulation : generalized weakness: abd remains distended : refused 2100 lactulose : watery yellow bowel movements x5 this shift : VSS; will cont to monitor .

## 2018-11-04 NOTE — PROGRESS NOTES
[facility]  Hepatology  Progress Note    Patient Name: Femi Enciso  MRN: [id number]  Admission Date: 10/27/2018  Hospital Length of Stay: 8 days  Attending Provider: Dante Cash MD   Primary Care Physician: Primary Doctor No  Principal Problem:Pre-transplant evaluation for liver transplant    Subjective:     Transplant status: No    HPI: 53 yea rold male with a history of HTN, HLD, DM Type 2, Depression, and Alcohol Dependence on who Hepatology is being consulted decompensated alcoholic cirrhosis and liver transplant evaluation.     History obtain from chart review (records in care everywhere) and from speaking to daughter/wife who are both RN's.  Patient's wife states that he was in fair health until the end of September when they were on vacation (on 9/21/18 they went to [facility]).  She states that after eating a sandwhich, patient became very ill and started having diarrhea, nausea, and emesis (wife denies seeing patient consume alcohol during this time).  Eventually  gastroenteritis cleared up, however patient became jaundice and went to go see his GI doctor and was found to have elevated LFTs and hypoxic and was admitted to their local hospital.  Patient had a large right sided effusion which was tapped (1L negative cytology) and he was treated for a PNA.  Patient had a non contrasted abdominal CT at that time which showed multiple hypodensities and US of the abdomen was recommend which no discrete lesions. Furthermore he was also started on steroids for alcoholic hepatitis which were stopped when patient did not improve.     Referral sent to [facility] for liver transplant evaluation and he was approved for outpatient evaluation as well as appointment with Dr. Sharma. On 10/26 [facility] nurse spoke to patient's wife and reported that patient was worsening our team recommend going to the hospital for admission. Family instead got on a commercial flight and brought patient to ED.     Social  "History:  Patient has been a binge drinker for ~20+ years. When he binges he drinks a 5th of Vodka per day for a couple of days and then stops. He drinks to create an "alternative reality" per wife. He was sober from [id number]. In 2014 daughter had a stroke and he subsequently entered an [facility] for Depression and Alcohol dependence. He also worked with a counselor during this period who eventually discharged him from counseling. Of note has tried AA in the past but has not been effective. Family has not seen him drink in ~ 6 months however they did find a debit card transaction for alcohol purchase on 7/28/18 as well as an empty 5th of Vodka (this was after spending ~ 1 month with his daughter). He follows with a local PCP who did mention his LFT's were elevated last year and he subsequently stopped with resolution of his LFT's. No mention of cirrhosis until this September. In March 2018 he quit his job as a  as he was not happy with his job. Family feels that around this time is when he began to decline. Prior to September they reported intermittent episodes of confusion and inappropriateness. No DUI or incarcerations.    Summary of Care Everywhere:  Labs  10/2    A1AT (-)   Hep A antibody total + on 10/2 and - on 10/3   Hep A IgM -   Hep B core antibody total -   Hep B core IgM -   Hep B surface antibody and antigen -   Hep C antibody -   Hep E IgG and IgM -  10/3  C diff toxin B +  10/4   GI stool pathogen - (campy, salmonella, shigella, vibrio, yersinia, noro, rota)    Imaging/Cardiac Workup:  CT abdomen/pelvis 10/1/18  Liver: Enlarged liver measuring 22 cm in the midline.  There is multiple areas of low density within the liver.  Recommend a follow-up renal sonogram to rule out focal small hepatic lesions or cysts.  Biliary:  Mildly distended gallbladder.  Gallbladder wall appears thick.  No calcified gallstones are seen.  Cannot exclude sludge within the gallbladder as seen on " image number 34.  Pancreas: Normal.  No lesion, fluid collection, ductal dilatation, or atrophy.  No pancreatitis or pseudocysts are seen.  Spleen: Mild splenomegaly noted.    ECHO 10/2/18  Left Ventricle-Mildly enlarged left ventricular cavity size. Normal left ventricular wall thickness. Normal left ventricular  systolic function. Left ventricular ejection fraction is estimated at 65 %. No regional wall motion abnormalities. Normal diastolic function.    Right Ventricle-Normal right ventricular size and systolic function, RVSP 42.7 mmHg.          Interval History: Reports some nausea overnight and this morning. Has some abdominal cramping. Denies any confusion, fevers, chills. Reports poor PO intake.    Current Facility-Administered Medications   Medication    0.9%  NaCl infusion    0.9%  NaCl infusion    acetaminophen tablet 650 mg    albumin human 25% bottle 25 g    albuterol-ipratropium 2.5 mg-0.5 mg/3 mL nebulizer solution 3 mL    dextrose 50% injection 12.5 g    dextrose 50% injection 25 g    folic acid tablet 1 mg    glucagon (human recombinant) injection 1 mg    glucose chewable tablet 16 g    glucose chewable tablet 24 g    insulin aspart U-100 pen 0-5 Units    lactulose 20 gram/30 mL solution Soln 15 g    lidocaine 5 % patch 1 patch    lidocaine 5 % patch 1 patch    midodrine tablet 15 mg    multivitamin tablet 1 tablet    ondansetron disintegrating tablet 8 mg    pantoprazole EC tablet 40 mg    promethazine (PHENERGAN) 12.5 mg in dextrose 5 % 50 mL IVPB    ramelteon tablet 8 mg    rifAXIMin tablet 550 mg    sevelamer carbonate tablet 800 mg    sodium bicarbonate tablet 1,300 mg    sodium chloride 0.9% flush 5 mL    thiamine tablet 100 mg    traMADol tablet 50 mg       Objective:     Vital Signs (Most Recent):  Temp: 98.3 °F (36.8 °C) (11/04/18 0749)  Pulse: 86 (11/04/18 0749)  Resp: (!) 28 (11/04/18 0749)  BP: (!) 99/54 (11/04/18 0749)  SpO2: 99 % (11/04/18 0749) Vital Signs  (24h Range):  Temp:  [97.5 °F (36.4 °C)-98.4 °F (36.9 °C)] 98.3 °F (36.8 °C)  Pulse:  [84-91] 86  Resp:  [16-28] 28  SpO2:  [92 %-99 %] 99 %  BP: ()/(52-62) 99/54     Weight: 81.6 kg (179 lb 14.3 oz) (11/04/18 0400)  Body mass index is 25.81 kg/m².    Physical Exam   Constitutional: He is oriented to person, place, and time.   Chronically ill-appearing   Eyes: Scleral icterus is present.   Cardiovascular: Normal rate and regular rhythm.   Pulmonary/Chest: Effort normal and breath sounds normal.   Abdominal: Soft. He exhibits no distension. There is no tenderness.   Musculoskeletal: He exhibits no edema or deformity.   Neurological: He is alert and oriented to person, place, and time.   Skin: Skin is warm and dry.   Vitals reviewed.      MELD-Na score: 41 at 11/4/2018  4:24 AM  MELD score: 41 at 11/4/2018  4:24 AM  Calculated from:  Serum Creatinine: 7 mg/dL (Rounded to 4 mg/dL) at 11/4/2018  4:24 AM  Serum Sodium: 133 mmol/L at 11/4/2018  4:24 AM  Total Bilirubin: 33.4 mg/dL at 11/4/2018  4:24 AM  INR(ratio): 2 at 11/4/2018  4:24 AM  Age: 53 years    Significant Labs:  CBC:   Recent Labs   Lab 11/04/18 0424   WBC 11.75   RBC 3.16*   HGB 10.4*   HCT 30.6*   PLT 52*     CMP:   Recent Labs   Lab 11/04/18 0424   GLU 88   CALCIUM 8.5*   ALBUMIN 2.4*   PROT 5.1*   *   K 4.4   CO2 23   CL 99   BUN 64*   CREATININE 7.0*   ALKPHOS 211*   ALT 33   AST 72*   BILITOT 33.4*     Coagulation:   Recent Labs   Lab 11/04/18 0424   INR 2.0*     Assessment/Plan:     Decompensated hepatic cirrhosis    53 year old male with a history of HTN, HLD, DM Type 2, Depression, and Alcohol Dependence on who Hepatology is being consulted decompensated alcoholic cirrhosis and liver transplant evaluation. Patient with decompensated alcohol cirrhosis and a MELD of 42 therefore liver transplant would be the only live saving/curative measure. Of note patient with strong history of alcohol abuse and multiple complications at this time  (UTI, uremia, HE, etc). Patient/family understand that although patient needs a liver he does need to undergo a liver transplant evaluation prior to selection committee and we are unsure how things will progress over the next couple of days due to how sick he is.     Addiction psych deemed patient moderate risk for relapse. He is undergoing inpatient transplant eval. He has a high MELD of 41.    Recommendations:  --Daily CMP, CBC, and INR  --Continue antibiotics  --Continue HRS protocol  -- HD per nephrology  --Continue lactulose and rifaximin  --Continue folic acid and thiamine   --Nutrition consult  --PT and OT   -- Undergoing testing for transplant eval         Thank you for your consult. I will follow-up with patient. Please contact us if you have any additional questions.    Maurice Williamson MD  Hepatology  [facility]

## 2018-11-05 ENCOUNTER — ANESTHESIA EVENT (OUTPATIENT)
Dept: ENDOSCOPY | Facility: HOSPITAL | Age: 53
DRG: 005 | End: 2018-11-05
Payer: COMMERCIAL

## 2018-11-05 LAB
ALBUMIN SERPL BCP-MCNC: 2.2 G/DL
ALP SERPL-CCNC: 213 U/L
ALT SERPL W/O P-5'-P-CCNC: 32 U/L
ANION GAP SERPL CALC-SCNC: 13 MMOL/L
AST SERPL-CCNC: 69 U/L
BACTERIA SPEC AEROBE CULT: NORMAL
BASOPHILS # BLD AUTO: 0.09 K/UL
BASOPHILS NFR BLD: 0.6 %
BILIRUB SERPL-MCNC: 35.9 MG/DL
BUN SERPL-MCNC: 77 MG/DL
CALCIUM SERPL-MCNC: 8.3 MG/DL
CHLORIDE SERPL-SCNC: 99 MMOL/L
CO2 SERPL-SCNC: 21 MMOL/L
CREAT SERPL-MCNC: 8.1 MG/DL
DIFFERENTIAL METHOD: ABNORMAL
EOSINOPHIL # BLD AUTO: 0.5 K/UL
EOSINOPHIL NFR BLD: 3.1 %
ERYTHROCYTE [DISTWIDTH] IN BLOOD BY AUTOMATED COUNT: 19.3 %
EST. GFR  (AFRICAN AMERICAN): 7.9 ML/MIN/1.73 M^2
EST. GFR  (NON AFRICAN AMERICAN): 6.8 ML/MIN/1.73 M^2
GLUCOSE SERPL-MCNC: 110 MG/DL
GRAM STN SPEC: NORMAL
HBV CORE AB SERPL QL IA: NEGATIVE
HBV SURFACE AB SER-ACNC: NEGATIVE M[IU]/ML
HBV SURFACE AG SERPL QL IA: NEGATIVE
HCT VFR BLD AUTO: 30 %
HCV AB SERPL QL IA: NEGATIVE
HEPATITIS A ANTIBODY, IGG: NEGATIVE
HGB BLD-MCNC: 10.2 G/DL
HIV 1+2 AB+HIV1 P24 AG SERPL QL IA: NEGATIVE
IMM GRANULOCYTES # BLD AUTO: 0.21 K/UL
IMM GRANULOCYTES NFR BLD AUTO: 1.3 %
INR PPP: 2.1
LYMPHOCYTES # BLD AUTO: 1.2 K/UL
LYMPHOCYTES NFR BLD: 7.8 %
MAGNESIUM SERPL-MCNC: 2.1 MG/DL
MCH RBC QN AUTO: 33.1 PG
MCHC RBC AUTO-ENTMCNC: 34 G/DL
MCV RBC AUTO: 97 FL
MONOCYTES # BLD AUTO: 2.1 K/UL
MONOCYTES NFR BLD: 13.1 %
NEUTROPHILS # BLD AUTO: 11.8 K/UL
NEUTROPHILS NFR BLD: 74.1 %
NRBC BLD-RTO: 0 /100 WBC
PHOSPHATE SERPL-MCNC: 5.2 MG/DL
PHOSPHATIDYLETHANOL (PETH): 23 NG/ML
PLATELET # BLD AUTO: 68 K/UL
PLATELET BLD QL SMEAR: ABNORMAL
PMV BLD AUTO: 13.1 FL
POCT GLUCOSE: 119 MG/DL (ref 70–110)
POCT GLUCOSE: 125 MG/DL (ref 70–110)
POCT GLUCOSE: 143 MG/DL (ref 70–110)
POTASSIUM SERPL-SCNC: 4.7 MMOL/L
PROT SERPL-MCNC: 4.9 G/DL
PROTHROMBIN TIME: 20.2 SEC
RBC # BLD AUTO: 3.08 M/UL
RPR SER QL: NORMAL
SODIUM SERPL-SCNC: 133 MMOL/L
WBC # BLD AUTO: 15.97 K/UL

## 2018-11-05 PROCEDURE — 83735 ASSAY OF MAGNESIUM: CPT | Mod: NTX

## 2018-11-05 PROCEDURE — 85025 COMPLETE CBC W/AUTO DIFF WBC: CPT | Mod: NTX

## 2018-11-05 PROCEDURE — 90935 HEMODIALYSIS ONE EVALUATION: CPT | Mod: NTX,,, | Performed by: INTERNAL MEDICINE

## 2018-11-05 PROCEDURE — 25000003 PHARM REV CODE 250: Mod: NTX | Performed by: HOSPITALIST

## 2018-11-05 PROCEDURE — 25000242 PHARM REV CODE 250 ALT 637 W/ HCPCS: Mod: NTX | Performed by: HOSPITALIST

## 2018-11-05 PROCEDURE — 99233 PR SUBSEQUENT HOSPITAL CARE,LEVL III: ICD-10-PCS | Mod: NTX,,, | Performed by: HOSPITALIST

## 2018-11-05 PROCEDURE — 84100 ASSAY OF PHOSPHORUS: CPT | Mod: NTX

## 2018-11-05 PROCEDURE — 90935 PR HEMODIALYSIS, ONE EVALUATION: ICD-10-PCS | Mod: NTX,,, | Performed by: INTERNAL MEDICINE

## 2018-11-05 PROCEDURE — 94640 AIRWAY INHALATION TREATMENT: CPT | Mod: NTX

## 2018-11-05 PROCEDURE — 80053 COMPREHEN METABOLIC PANEL: CPT | Mod: NTX

## 2018-11-05 PROCEDURE — 99232 PR SUBSEQUENT HOSPITAL CARE,LEVL II: ICD-10-PCS | Mod: NTX,,, | Performed by: PSYCHIATRY & NEUROLOGY

## 2018-11-05 PROCEDURE — 94761 N-INVAS EAR/PLS OXIMETRY MLT: CPT | Mod: NTX

## 2018-11-05 PROCEDURE — 90935 HEMODIALYSIS ONE EVALUATION: CPT | Mod: NTX

## 2018-11-05 PROCEDURE — 25000003 PHARM REV CODE 250: Mod: NTX | Performed by: STUDENT IN AN ORGANIZED HEALTH CARE EDUCATION/TRAINING PROGRAM

## 2018-11-05 PROCEDURE — 36415 COLL VENOUS BLD VENIPUNCTURE: CPT | Mod: NTX

## 2018-11-05 PROCEDURE — 87040 BLOOD CULTURE FOR BACTERIA: CPT | Mod: NTX

## 2018-11-05 PROCEDURE — 25000003 PHARM REV CODE 250: Mod: NTX | Performed by: INTERNAL MEDICINE

## 2018-11-05 PROCEDURE — 99233 SBSQ HOSP IP/OBS HIGH 50: CPT | Mod: NTX,,, | Performed by: HOSPITALIST

## 2018-11-05 PROCEDURE — 99232 SBSQ HOSP IP/OBS MODERATE 35: CPT | Mod: NTX,,, | Performed by: PSYCHIATRY & NEUROLOGY

## 2018-11-05 PROCEDURE — 20600001 HC STEP DOWN PRIVATE ROOM: Mod: NTX

## 2018-11-05 PROCEDURE — 85610 PROTHROMBIN TIME: CPT | Mod: NTX

## 2018-11-05 RX ORDER — LACTULOSE 10 G/15ML
10 SOLUTION ORAL 2 TIMES DAILY
Status: DISCONTINUED | OUTPATIENT
Start: 2018-11-05 | End: 2018-11-11

## 2018-11-05 RX ORDER — FLUCONAZOLE 100 MG/1
100 TABLET ORAL DAILY
Status: DISCONTINUED | OUTPATIENT
Start: 2018-11-05 | End: 2018-11-11

## 2018-11-05 RX ORDER — IPRATROPIUM BROMIDE AND ALBUTEROL SULFATE 2.5; .5 MG/3ML; MG/3ML
3 SOLUTION RESPIRATORY (INHALATION) EVERY 6 HOURS PRN
Status: DISCONTINUED | OUTPATIENT
Start: 2018-11-05 | End: 2018-11-11

## 2018-11-05 RX ADMIN — SEVELAMER CARBONATE 800 MG: 800 TABLET, FILM COATED ORAL at 09:11

## 2018-11-05 RX ADMIN — SEVELAMER CARBONATE 800 MG: 800 TABLET, FILM COATED ORAL at 12:11

## 2018-11-05 RX ADMIN — SODIUM CHLORIDE: 0.9 INJECTION, SOLUTION INTRAVENOUS at 07:11

## 2018-11-05 RX ADMIN — LACTULOSE 10 G: 20 SOLUTION ORAL at 09:11

## 2018-11-05 RX ADMIN — MIDODRINE HYDROCHLORIDE 15 MG: 5 TABLET ORAL at 02:11

## 2018-11-05 RX ADMIN — SODIUM BICARBONATE 650 MG TABLET 1300 MG: at 02:11

## 2018-11-05 RX ADMIN — LACTULOSE 10 G: 20 SOLUTION ORAL at 10:11

## 2018-11-05 RX ADMIN — MIDODRINE HYDROCHLORIDE 15 MG: 5 TABLET ORAL at 09:11

## 2018-11-05 RX ADMIN — LIDOCAINE 1 PATCH: 50 PATCH CUTANEOUS at 03:11

## 2018-11-05 RX ADMIN — RIFAXIMIN 550 MG: 550 TABLET ORAL at 11:11

## 2018-11-05 RX ADMIN — Medication 100 MG: at 11:11

## 2018-11-05 RX ADMIN — RIFAXIMIN 550 MG: 550 TABLET ORAL at 09:11

## 2018-11-05 RX ADMIN — FOLIC ACID 1 MG: 1 TABLET ORAL at 11:11

## 2018-11-05 RX ADMIN — FLUCONAZOLE 100 MG: 100 TABLET ORAL at 11:11

## 2018-11-05 RX ADMIN — LIDOCAINE 1 PATCH: 50 PATCH CUTANEOUS at 09:11

## 2018-11-05 RX ADMIN — PANTOPRAZOLE SODIUM 40 MG: 40 TABLET, DELAYED RELEASE ORAL at 11:11

## 2018-11-05 RX ADMIN — SEVELAMER CARBONATE 800 MG: 800 TABLET, FILM COATED ORAL at 05:11

## 2018-11-05 RX ADMIN — SODIUM BICARBONATE 650 MG TABLET 1300 MG: at 11:11

## 2018-11-05 RX ADMIN — IPRATROPIUM BROMIDE AND ALBUTEROL SULFATE 3 ML: .5; 3 SOLUTION RESPIRATORY (INHALATION) at 03:11

## 2018-11-05 RX ADMIN — SODIUM BICARBONATE 650 MG TABLET 1300 MG: at 09:11

## 2018-11-05 RX ADMIN — ACETAMINOPHEN 650 MG: 325 TABLET, FILM COATED ORAL at 09:11

## 2018-11-05 RX ADMIN — ONDANSETRON 8 MG: 8 TABLET, ORALLY DISINTEGRATING ORAL at 11:11

## 2018-11-05 RX ADMIN — THERA TABS 1 TABLET: TAB at 11:11

## 2018-11-05 NOTE — PROGRESS NOTES
[facility]  Nephrology  Progress Note    Patient Name: Femi Enciso  MRN: [id number]  Admission Date: 10/27/2018  Hospital Length of Stay: 9 days  Attending Provider: Dante Cash MD   Primary Care Physician: Primary Doctor No  Principal Problem:Pre-transplant evaluation for liver transplant    Subjective:     HPI: 54 y/o man with DM2 presents to the ED with family for liver failure (likely due to EtOH abundant history of drinking - diagnosed in Sept 2018 in [place]).  He reports jaundice, generalized weakness, nausea, diarrhea, and decreased appetite since Sept 2018.  He is from [place], [place], and Dr. Sharma (patients physician) recommended bringing him to hospital for evaluation.  Patient denies any fever, chills, vomiting, chest pain, palpitations, SOB, abdominal pain.      Nephrology consulted for evaluation/management Adri.     Interval History:   NAEON, Events over the weekend noted. Seen today while on HD in [place] tolerating well but having trouble with temporary catheter. MS stable alert and awake Oriented x 3. He is hemodynamically stable. Remains anuric. Oxygenation stable RA. Fluid balance gain 1910 ml overnight but stool output not document. Bb remains elevated. MELD 42.    Review of patient's allergies indicates:   Allergen Reactions    Penicillins Nausea And Vomiting and Rash     Current Facility-Administered Medications   Medication Frequency    0.9%  NaCl infusion PRN    0.9%  NaCl infusion PRN    acetaminophen tablet 650 mg Q4H PRN    albumin human 25% bottle 25 g PRN    albuterol-ipratropium 2.5 mg-0.5 mg/3 mL nebulizer solution 3 mL Q6H PRN    dextrose 50% injection 12.5 g PRN    dextrose 50% injection 25 g PRN    fluconazole tablet 100 mg Daily    folic acid tablet 1 mg Daily    glucagon (human recombinant) injection 1 mg PRN    glucose chewable tablet 16 g PRN    glucose chewable tablet 24 g PRN    insulin aspart U-100 pen 0-5 Units QID (AC + HS) PRN    lactulose 20  gram/30 mL solution Soln 10 g BID    lidocaine 5 % patch 1 patch Q24H    lidocaine 5 % patch 1 patch Q24H    midodrine tablet 15 mg TID    multivitamin tablet 1 tablet Daily    ondansetron disintegrating tablet 8 mg Q6H PRN    pantoprazole EC tablet 40 mg Daily    promethazine (PHENERGAN) 12.5 mg in dextrose 5 % 50 mL IVPB Q6H PRN    promethazine (PHENERGAN) 6.25 mg in dextrose 5 % 50 mL IVPB Q6H PRN    ramelteon tablet 8 mg Nightly PRN    rifAXIMin tablet 550 mg BID    sevelamer carbonate tablet 800 mg TID WM    sodium bicarbonate tablet 1,300 mg TID    sodium chloride 0.9% flush 5 mL PRN    thiamine tablet 100 mg Daily    traMADol tablet 50 mg Q8H PRN       Objective:     Vital Signs (Most Recent):  Temp: (!) 95.6 °F (35.3 °C) (11/05/18 1432)  Pulse: 81 (11/05/18 1507)  Resp: 18 (11/05/18 1507)  BP: (!) 93/54 (11/05/18 1432)  SpO2: 100 % (11/05/18 1507)  O2 Device (Oxygen Therapy): room air (11/05/18 1507) Vital Signs (24h Range):  Temp:  [95.6 °F (35.3 °C)-98.7 °F (37.1 °C)] 95.6 °F (35.3 °C)  Pulse:  [75-94] 81  Resp:  [16-20] 18  SpO2:  [92 %-100 %] 100 %  BP: ()/(47-61) 93/54     Weight: 82.6 kg (182 lb 3.4 oz) (11/05/18 0400)  Body mass index is 26.14 kg/m².  Body surface area is 2.02 meters squared.    I/O last 3 completed shifts:  In: 2160 [P.O.:2060; IV Piggyback:100]  Out: 200 [Urine:200]    Physical Exam   Constitutional: He appears well-developed. He appears cachectic. He is cooperative. No distress.   Temporal musc wasting   HENT:   Head: Normocephalic and atraumatic.   Eyes: Conjunctivae are normal. Pupils are equal, round, and reactive to light.   Neck: Trachea normal and normal range of motion. Neck supple. No JVD present.   Cardiovascular: Normal rate, regular rhythm, S1 normal, S2 normal and normal pulses. Exam reveals no gallop and no friction rub.   No murmur heard.  Pulmonary/Chest: Effort normal. He has decreased breath sounds in the right middle field, the right lower  field and the left lower field. He has no rhonchi.   Abdominal: Soft. Bowel sounds are normal. He exhibits distension and ascites. There is no tenderness.   Musculoskeletal: Normal range of motion. He exhibits no edema.   Neurological: He is alert.   Skin: Skin is warm and dry. Capillary refill takes less than 2 seconds.   Psychiatric: He has a normal mood and affect. His behavior is normal.   Vitals reviewed.      Significant Labs:  ABGs: No results for input(s): PH, PCO2, HCO3, POCSATURATED, BE in the last 168 hours.  BMP:   Recent Labs   Lab 11/05/18  0350      CL 99   CO2 21*   BUN 77*   CREATININE 8.1*   CALCIUM 8.3*   MG 2.1     Cardiac Markers: No results for input(s): CKMB, TROPONINT, MYOGLOBIN in the last 168 hours.  CBC:   Recent Labs   Lab 11/05/18 0350   WBC 15.97*   RBC 3.08*   HGB 10.2*   HCT 30.0*   PLT 68*   MCV 97   MCH 33.1*   MCHC 34.0     CMP:   Recent Labs   Lab 11/05/18 0350      CALCIUM 8.3*   ALBUMIN 2.2*   PROT 4.9*   *   K 4.7   CO2 21*   CL 99   BUN 77*   CREATININE 8.1*   ALKPHOS 213*   ALT 32   AST 69*   BILITOT 35.9*     Coagulation:   Recent Labs   Lab 11/05/18 0350   INR 2.1*     No results for input(s): COLORU, CLARITYU, SPECGRAV, PHUR, PROTEINUA, GLUCOSEU, BILIRUBINCON, BLOODU, WBCU, RBCU, BACTERIA, MUCUS, NITRITE, LEUKOCYTESUR, UROBILINOGEN, HYALINECASTS in the last 168 hours.  All labs within the past 24 hours have been reviewed.     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   Plan:  - No evidence of renal recovery remains anuric.  - HD x 3.5 hrs today for metabolic clearance and volume management seen while on HD but un ablt to complete treatment  secondary to catheter issues wuth QB anf venous pressures  - Will plan for Perm Cath on Wednesday  - CXR to evaluate effusion  - UF ~ 1 lt as tolerated  - Remains anuric  - Renal US with adequate size kidneys no hydro  - UCx  Enterococcus Cloacae  > 100 K on Cefepime which is sensitive treatment should compete as per primary team  - UPCR low 0.26 g/g  - Strict I/O and chart  - Avoid nephrotoxic medications  - Maintain MAP >65 please continue midodrine  - please keep Hb > 7 gm/dL  - Medication doses adjusted to GFR  - Phos starting to rise will need Renvela in near future if no evidence of recoverr cont renal diet           Thank you for your consult. I will follow-up with patient. Please contact us if you have any additional questions.    Nikolay Nino MD  Nephrology  [facility]

## 2018-11-05 NOTE — PLAN OF CARE
Problem: Patient Care Overview  Goal: Plan of Care Review  Outcome: Ongoing (interventions implemented as appropriate)  POC reviewed at bedside w/patient. Questions and concerns addressed. VSS. Jaundiced. Awaiting dialysis. Anuric. Safety maintained. Bed in low and locked position. Call light within reach. Side rails up x2. Frequent rounds.

## 2018-11-05 NOTE — ASSESSMENT & PLAN NOTE
ASSESSMENT     Femi Enciso is a 53 y.o. male with a past psychiatric history of alcohol use disorder and anxiety, who presented to [facility] due to AVA likely 2/2 EtOH abuse. Psychiatry was consulted to address the patient's symptoms of substance abuse and candidacy for liver transplant. Pt reports discovering approx 5 yrs ago from his PCP that he had abnormal laboratory findings (transaminitis) indicative of EtOH abuse, and advised that he needed to stop drinking. Thereafter, pt reports a period of 2.5yrs of sobriety in the setting of outpatient counseling and AA meetings, only to relapse, thereafter, following a CVA experienced by his daughter. Pt claims said experience led him into a bout of depression, which required a brief inpatient psychiatric hospitalization for medical stabilization, however he continued to drink, until 9/21/2018, when he was hospitalized and diagnosed with C diff colitis, ALI, and DEL. Pt was then sent to [facility] for liver transplant evaluation. Pt exhibits stable/improving inisght/judgment into his addiction evidenced by his decision to quit drinking, upon most recent hospitalization and discovery of alcohol induced liver dysfunction. Pt also possesses strong familial support evidenced by multiple family members at bedside, willing to provide pt with any/all support required to in aid a full recovery. Pt willing to remain abstinent from EtOH, attend and complete an IOP program, receive serial PETH and Utox screening, as well as attend AA meetings, thereafter.     IMPRESSION  Alcohol use disorder, severe, in early remission  Anxiety disorder, unspecified  Depressive disorder, unspecified    RECOMMENDATION(S)     1. Scheduled Medication(s):  Per primary team    2. PRN Medication(s):  Per primary team    3. Other:  · Moderate risk at this time as long as patient continues to be committed to attending IOP upon medical stability.   · Endorses poor sleep, can start Remeron 15 mg PO nightly.  · Recommend  serial PETH testing. PETH of 23 is not overly concerning given patient's admitted last alcohol use on 9/21/18  · Recommend serial urine toxicology screening  · Recommend referral to and completion of day program for education on alcohol abuse, for coping/behavioral skills to reinforce sobriety, as well as to attend AA meetings.

## 2018-11-05 NOTE — PROGRESS NOTES
Hospital Medicine  Progress note    Team: [facility] MED  Dante Cash MD  Admit Date: 10/27/2018  GWEN 11/9/2018  Code status: Full Code    Principal Problem:  Pre-transplant evaluation for liver transplant    Interval hx: Patient seen and examined at bedside, underwent HD today and tolerated well. EGD tomorrow for liver transplant evaluation,addiction psychiatry has been re consulted due to positive PETH.     ROS     Constitutional: Positive for activity change, appetite change, chills, fatigue and unexpected weight change. Negative for diaphoresis and fever.   HENT: Negative for congestion, drooling, ear discharge, facial swelling, hearing loss, sore throat and tinnitus.    Eyes: Negative.         Icteral jaundice   Respiratory: Positive for shortness of breath. Negative for cough, choking, chest tightness and stridor.    Cardiovascular: Negative.  Negative for chest pain, palpitations and leg swelling.   Gastrointestinal: Positive for abdominal distention, diarrhea, nausea and vomiting. Negative for abdominal pain, anal bleeding, blood in stool and constipation.   Endocrine: Negative.  Negative for polydipsia, polyphagia and polyuria.   Genitourinary: Negative for difficulty urinating, dysuria, enuresis, flank pain, frequency and hematuria.   Musculoskeletal: Negative.  Negative for arthralgias, back pain, gait problem, joint swelling, neck pain and neck stiffness.   Skin: Negative.    Allergic/Immunologic: Negative.    Neurological: Positive for tremors and weakness. Negative for dizziness, seizures, syncope, facial asymmetry, speech difficulty, light-headedness, numbness and headaches.   Hematological: Negative.  Negative for adenopathy. Does not bruise/bleed easily.   Psychiatric/Behavioral: Positive for confusion. Negative for agitation, behavioral problems, decreased concentration, dysphoric mood, self-injury and sleep disturbance. The patient is not nervous/anxious and is not hyperactive.         PEx  Temp:  [96.3 °F (35.7 °C)-98.7 °F (37.1 °C)]   Pulse:  [75-94]   Resp:  [16-20]   BP: ()/(47-61)   SpO2:  [92 %-95 %]     Intake/Output Summary (Last 24 hours) at 11/5/2018 1440  Last data filed at 11/5/2018 1015  Gross per 24 hour   Intake 2210 ml   Output 1531 ml   Net 679 ml     Constitutional: He is oriented to person, place, and time. No distress.   HENT:   Head: Normocephalic and atraumatic.   Scleral jaundice present;  AAOx3 at the time, following commands   Eyes: EOM are normal. Right eye exhibits no discharge. Left eye exhibits no discharge. Scleral icterus is present.   Neck: Normal range of motion. Neck supple. No JVD present. No tracheal deviation present.   Cardiovascular: Normal rate and regular rhythm. Exam reveals no gallop and no friction rub.   No murmur heard.  Pulmonary/Chest: No stridor. No respiratory distress. He has no wheezes. He has rales. He exhibits no tenderness.   Mildly labored breathing  SOB, but maintaining sats on room air   Abdominal: Soft. Bowel sounds are normal. He exhibits distension. There is no tenderness. There is no rebound and no guarding. No hernia.   Musculoskeletal: Normal range of motion. He exhibits no edema, tenderness or deformity.   Lymphadenopathy:     He has no cervical adenopathy.   Neurological: He is alert and oriented to person, place, and time. He displays abnormal reflex. No cranial nerve deficit or sensory deficit. He exhibits abnormal muscle tone.   Sleepy, but follows commands appropriately   Skin: Skin is warm and dry. Capillary refill takes less than 2 seconds. He is not diaphoretic.   Jaundice          Recent Labs   Lab 11/03/18  0513 11/04/18  0424 11/05/18  0350   WBC 10.34 11.75 15.97*   HGB 10.3* 10.4* 10.2*   HCT 30.8* 30.6* 30.0*   PLT 50* 52* 68*     Recent Labs   Lab 11/03/18  0513 11/04/18  0424 11/05/18  0350   * 133* 133*   K 4.2 4.4 4.7    99 99   CO2 23 23 21*   BUN 43* 64* 77*   CREATININE 5.1* 7.0* 8.1*    GLU 92 88 110   CALCIUM 8.6* 8.5* 8.3*   MG 2.0 2.2 2.1   PHOS 4.1 5.0* 5.2*     Recent Labs   Lab 11/03/18  0513 11/04/18  0424 11/05/18  0350   ALKPHOS 181* 211* 213*   ALT 32 33 32   AST 57* 72* 69*   ALBUMIN 2.5* 2.4* 2.2*   PROT 5.0* 5.1* 4.9*   BILITOT 35.5* 33.4* 35.9*   INR 2.1* 2.0* 2.1*      Recent Labs   Lab 11/03/18  2119 11/04/18  0734 11/04/18  1129 11/04/18  1648 11/04/18  2050 11/05/18  0956   POCTGLUCOSE 148* 93 99 155* 136* 119*     No results for input(s): CPK, CPKMB, MB, TROPONINI in the last 72 hours.    Scheduled Meds:   fluconazole  100 mg Oral Daily    folic acid  1 mg Oral Daily    lactulose  10 g Oral BID    lidocaine  1 patch Transdermal Q24H    lidocaine  1 patch Transdermal Q24H    midodrine  15 mg Oral TID    multivitamin  1 tablet Oral Daily    pantoprazole  40 mg Oral Daily    rifAXImin  550 mg Oral BID    sevelamer carbonate  800 mg Oral TID WM    sodium bicarbonate  1,300 mg Oral TID    thiamine  100 mg Oral Daily     Continuous Infusions:  As Needed:  sodium chloride 0.9%, sodium chloride 0.9%, acetaminophen, albumin human 25%, albuterol-ipratropium, dextrose 50%, dextrose 50%, glucagon (human recombinant), glucose, glucose, insulin aspart U-100, ondansetron, promethazine (PHENERGAN) IVPB, promethazine (PHENERGAN) IVPB, ramelteon, sodium chloride 0.9%, traMADol    Active Hospital Problems    Diagnosis  POA    *Pre-transplant evaluation for liver transplant [Z01.818]  Not Applicable    Alcohol use disorder, severe, in early remission [F10.21]  Yes    Decompensated hepatic cirrhosis [K72.90]  Yes    Acute liver failure without hepatic coma [K72.00]  Yes    Alcoholic hepatitis with ascites [K70.11]  Yes    Acute liver failure [K72.00]  Yes    Jaundice [R17]  Yes    Hepatorenal syndrome [K76.7]  Yes    DEL (acute kidney injury) [N17.9]  Yes     Chronic    Acute kidney failure with lesion of tubular necrosis [N17.0]  Yes    Severe alcohol dependence [F10.20]   Yes    Coagulopathy [D68.9]  Yes    Anemia of chronic disease [D63.8]  Yes    Thrombocytopenia [D69.6]  Yes    Hyponatremia [E87.1]  Yes    Hepatic encephalopathy [K72.90]  Yes    Metabolic acidosis [E87.2]  Yes    Moderate protein malnutrition [E44.0]  Yes    Type 2 diabetes mellitus without complication [E11.9]  Yes    Acute cystitis without hematuria [N30.00]  Yes    Pleural effusion associated with hepatic disorder [K76.9, J91.8]  Yes      Resolved Hospital Problems    Diagnosis Date Resolved POA    Chronic kidney disease-mineral and bone disorder [N18.9, E83.9, M89.9] 11/02/2018 Yes    Sepsis [A41.9] 10/29/2018 Yes       Overview      Assessment and Plan for Problems addressed today:  Psychiatric   Severe alcohol dependence     - Last drink on 09/21/2018  - Unlikely to have DTs  - Continue to monitor   Renal/   DEL (acute kidney injury)     - Worsening DEL, Uremia,   - Will place dialysis line today and CRRT  - F/u w nephrology  - patient remains anuric   - Avoid nephrotoxic medications  - Renally adjust medications      ID   Sepsis     - Recent hospitalization w Rx for PNA and C.diff  - Leukocytosis at 15K  - will reorder blood cultures for surveillance  - geoff start on prophylactic fluconazole PO daily       Hematology   Thrombocytopenia     - Likely due to liver dz  - no sign of bleeding  - Continue to monitor      Coagulopathy     - INR 2.3 on 10/28 upon admission to ICU  - now 2.0   Oncology   Anemia of chronic disease     - h/h stable , continue to monitor      Endocrine   Type 2 diabetes mellitus without complication     - SSI  - Hold metformin at home      GI   * Acute liver failure without hepatic coma       MELD-Na score: 42 at 11/5/2018  3:50 AM  MELD score: 42 at 11/5/2018  3:50 AM  Calculated from:  Serum Creatinine: 8.1 mg/dL (Rounded to 4 mg/dL) at 11/5/2018  3:50 AM  Serum Sodium: 133 mmol/L at 11/5/2018  3:50 AM  Total Bilirubin: 35.9 mg/dL at 11/5/2018  3:50 AM  INR(ratio): 2.1 at  11/5/2018  3:50 AM  Age: 53 years  --Iron 101, Transferrin 66, TIBC 98 Iron sat 103, Ferritin 1044,   --Cerruloplasmin-IP  --MAYA, ASMA-IP  --IgG 2065  --Acute hepatis panel-IP  --AFP-IP  --PETH-IP  - Continue albumin, midodrine, lactulose and rifaxime  - Continue abx,   - Hepatology consulted, follow up recs   Hepatic encephalopathy     - Continue lactulose and rifaximine  - Monitor BM accordingly      Pleural effusion associated with hepatic disorder     - CXR much improved s/p drainage  - Chest tube removed on 10/31/2018  - saturating well on room air    Hepatorenal syndrome     - Continue midodrine, albumin  - Maintain MAP>65      Decompensated hepatic cirrhosis     - see above       Diet: low sodium, 800 cc fluid restriction  GI PPx:   DVT PPx:    NI/SCD  Goals of Care: Full code     Discharge plan and follow up: pending liver transplant evaluation, high MELD makes discharge prohibitive at this point     Provider    Dante Chawla MD  Staff Hospitalist  Department of Hospital Medicine  [facility]   [phone]

## 2018-11-05 NOTE — ANESTHESIA PREPROCEDURE EVALUATION
[facility]  Anesthesia Pre-Operative Evaluation         Patient Name: Femi Enciso  YOB: 1965  MRN: [id number]    SUBJECTIVE:     Pre-operative evaluation for Procedure(s) (LRB):  EGD (ESOPHAGOGASTRODUODENOSCOPY) (N/A)     11/05/2018    Femi Enciso is a 53 y.o. male w/ a significant PMHx of HTN, HLD, T2DM, and alcoholic cirrhosis currently admitted for acute alcoholic cirrhosis decompensation and liver transplant evaluation. Complicated by hepatorenal on dialysis (last HD 11/5).    Patient now presents for the above procedure(s).      LDA:       Percutaneous Central Line Insertion/Assessment - triple lumen  10/28/18 1740 right internal jugular (Active)   Number of days: 7            Trialysis (Dialysis) Catheter right internal jugular (Active)   Number of days:             Peripheral IV - Single Lumen 11/01/18 1631 Left Wrist (Active)   Number of days: 3       Prev airway: None documented.    Drips: None documented.      Patient Active Problem List   Diagnosis    Acute liver failure without hepatic coma    Alcoholic hepatitis with ascites    Acute liver failure    Jaundice    Hepatorenal syndrome    DEL (acute kidney injury)    Acute kidney failure with lesion of tubular necrosis    Severe alcohol dependence    Coagulopathy    Anemia of chronic disease    Thrombocytopenia    Hyponatremia    Hepatic encephalopathy    Metabolic acidosis    Moderate protein malnutrition    Type 2 diabetes mellitus without complication    Acute cystitis without hematuria    Pleural effusion associated with hepatic disorder    Decompensated hepatic cirrhosis    Alcohol use disorder, severe, in early remission    Pre-transplant evaluation for liver transplant       Review of patient's allergies indicates:   Allergen Reactions    Penicillins Nausea And Vomiting and Rash     Tolerated cefepime 10/27/18       Current Inpatient Medications:   fluconazole  100 mg Oral Daily    folic acid  1  mg Oral Daily    lactulose  10 g Oral BID    lidocaine  1 patch Transdermal Q24H    lidocaine  1 patch Transdermal Q24H    midodrine  15 mg Oral TID    multivitamin  1 tablet Oral Daily    pantoprazole  40 mg Oral Daily    rifAXImin  550 mg Oral BID    sevelamer carbonate  800 mg Oral TID WM    sodium bicarbonate  1,300 mg Oral TID    thiamine  100 mg Oral Daily       No current facility-administered medications on file prior to encounter.      Current Outpatient Medications on File Prior to Encounter   Medication Sig Dispense Refill    calcium carbonate/vitamin D3 (VITAMIN D-3 ORAL) Take 1 tablet by mouth once daily.      cyanocobalamin (VITAMIN B-12) 100 MCG tablet Take 100 mcg by mouth once daily.      folic acid (FOLVITE) 1 MG tablet Take 1 mg by mouth once daily.      lactulose (CHRONULAC) 10 gram/15 mL solution Take 20 g by mouth 3 (three) times daily.      multivitamin (THERAGRAN) per tablet Take 1 tablet by mouth once daily.      omeprazole (PRILOSEC) 40 MG capsule Take 40 mg by mouth once daily.      ondansetron (ZOFRAN) 4 MG tablet Take 4 mg by mouth daily as needed for Nausea.      rifAXIMin (XIFAXAN) 550 mg Tab Take 550 mg by mouth 2 (two) times daily.         History reviewed. No pertinent surgical history.    Social History     Socioeconomic History    Marital status:      Spouse name: Not on file    Number of children: Not on file    Years of education: Not on file    Highest education level: Not on file   Social Needs    Financial resource strain: Not on file    Food insecurity - worry: Not on file    Food insecurity - inability: Not on file    Transportation needs - medical: Not on file    Transportation needs - non-medical: Not on file   Occupational History    Not on file   Tobacco Use    Smoking status: Never Smoker   Substance and Sexual Activity    Alcohol use: Not on file    Drug use: Not on file    Sexual activity: Not on file   Other Topics Concern     Not on file   Social History Narrative    Not on file       OBJECTIVE:     Vital Signs Range (Last 24H):  Temp:  [35.7 °C (96.3 °F)-37.1 °C (98.7 °F)]   Pulse:  [75-96]   Resp:  [16-20]   BP: ()/(47-61)   SpO2:  [92 %-96 %]       Significant Labs:  Lab Results   Component Value Date    WBC 15.97 (H) 11/05/2018    HGB 10.2 (L) 11/05/2018    HCT 30.0 (L) 11/05/2018    PLT 68 (L) 11/05/2018    ALT 32 11/05/2018    AST 69 (H) 11/05/2018     (L) 11/05/2018    K 4.7 11/05/2018    CL 99 11/05/2018    CREATININE 8.1 (H) 11/05/2018    BUN 77 (H) 11/05/2018    CO2 21 (L) 11/05/2018    PSA 0.39 11/02/2018    INR 2.1 (H) 11/05/2018    HGBA1C 4.8 10/27/2018       Diagnostic Studies:   Dobutamine stress  1. The EKG portion of this study is negative for ischemia at a peak heart rate of 139 bpm (83% of predicted).   2. Blood pressure response was abnormal, with an inappropriate drop in systolic pressure with infusion  (Presenting BP: 98/56 Peak BP: 72/44).   3. No significant arrhythmias were present.   4. There were no symptoms of chest discomfort or significant dyspnea throughout the protocol.    EKG (10/27/2018):   Vent. Rate : 070 BPM     Atrial Rate : 070 BPM     P-R Int : 138 ms          QRS Dur : 122 ms      QT Int : 456 ms       P-R-T Axes : 043 005 061 degrees     QTc Int : 492 ms    Normal sinus rhythm  Nonspecific intraventricular conduction delay  Nonspecific ST abnormality  Abnormal ECG  No previous ECGs available    2D ECHO:  Results for orders placed or performed during the hospital encounter of 10/27/18   2D echo with color flow doppler   Result Value Ref Range    Calculated EF 73 55 - 65    Diastolic Dysfunction No     Est. PA Systolic Pressure 32.16     Tricuspid Valve Regurgitation TRIVIAL      CONCLUSIONS     1 - Hyperdynamic left ventricular systolic function (EF >70%).     2 - Normal right ventricular systolic function .     3 - Normal left ventricular diastolic function.     4 - Mild left atrial  enlargement.     5 - The estimated PA systolic pressure is 32 mmHg.    ASSESSMENT/PLAN:         Anesthesia Evaluation    I have reviewed the Patient Summary Reports.    I have reviewed the Nursing Notes.   I have reviewed the Medications.     Review of Systems  Anesthesia Hx:  No previous Anesthesia  Neg history of prior surgery.  Denies Personal Hx of Anesthesia complications.   Social:  Non-Smoker, Alcohol Use    Hematology/Oncology:         -- Anemia:   Cardiovascular:   Hypertension hyperlipidemia ECG has been reviewed. CONCLUSIONS     1 - Normal left ventricular systolic function (EF 60-65%).     2 - No wall motion abnormalities.     3 - Normal left ventricular diastolic function.     4 - Normal right ventricular systolic function .     5 - Aortic valve sclerosis with mildly restrictive leaflet motion without significant stenosis..     No evidence of stress induced myocardial ischemia.         This document has been electronically    SIGNED BY: Luca Bains MD On: 11/02/2018 16:29   Pulmonary:   Shortness of breath (2/2 plueral effusion (drained)) Has pleural effusion   Renal/:   Chronic Renal Disease, Dialysis    Hepatic/GI:   Liver Disease, Hepatitis    Endocrine:   Diabetes    Psych:   Psychiatric History          Physical Exam  General:  Well nourished, Jaundice, Large Griffith    Airway/Jaw/Neck:  Airway Findings: Mouth Opening: Normal Tongue: Normal  General Airway Assessment: Adult  Mallampati: II  Improves to I with phonation.  TM Distance: Normal, at least 6 cm  Jaw/Neck Findings:  Neck ROM: Normal ROM      Dental:  Dental Findings: In tact   Chest/Lungs:  Chest/Lungs Findings: Clear to auscultation, Normal Respiratory Rate     Heart/Vascular:  Heart Findings: Rate: Normal  Rhythm: Regular Rhythm  Sounds: Normal     Abdomen:  Abdomen Findings: Normal    Musculoskeletal:  Musculoskeletal Findings: Normal   Skin:  Skin Findings: Normal    Mental Status:  Mental Status Findings:  Cooperative, Alert and  Oriented         Anesthesia Plan  Type of Anesthesia, risks & benefits discussed:  Anesthesia Type:  MAC, general  Patient's Preference:   Intra-op Monitoring Plan: standard ASA monitors  Intra-op Monitoring Plan Comments:   Post Op Pain Control Plan: multimodal analgesia, IV/PO Opioids PRN and per primary service following discharge from PACU  Post Op Pain Control Plan Comments:   Induction:   IV  Beta Blocker:  Patient is not currently on a Beta-Blocker (No further documentation required).       Informed Consent: Patient understands risks and agrees with Anesthesia plan.  Questions answered. Anesthesia consent signed with patient.  ASA Score: 3     Day of Surgery Review of History & Physical:     H&P completed by Anesthesiologist.       Ready For Surgery From Anesthesia Perspective.

## 2018-11-05 NOTE — SUBJECTIVE & OBJECTIVE
Interval History:   NAEON, Events over the weekend noted. Seen today while on HD in [place] tolerating well but having trouble with temporary catheter. MS stable alert and awake Oriented x 3. He is hemodynamically stable. Remains anuric. Oxygenation stable RA. Fluid balance gain 1910 ml overnight but stool output not document. Bb remains elevated. MELD 42.    Review of patient's allergies indicates:   Allergen Reactions    Penicillins Nausea And Vomiting and Rash     Current Facility-Administered Medications   Medication Frequency    0.9%  NaCl infusion PRN    0.9%  NaCl infusion PRN    acetaminophen tablet 650 mg Q4H PRN    albumin human 25% bottle 25 g PRN    albuterol-ipratropium 2.5 mg-0.5 mg/3 mL nebulizer solution 3 mL Q6H PRN    dextrose 50% injection 12.5 g PRN    dextrose 50% injection 25 g PRN    fluconazole tablet 100 mg Daily    folic acid tablet 1 mg Daily    glucagon (human recombinant) injection 1 mg PRN    glucose chewable tablet 16 g PRN    glucose chewable tablet 24 g PRN    insulin aspart U-100 pen 0-5 Units QID (AC + HS) PRN    lactulose 20 gram/30 mL solution Soln 10 g BID    lidocaine 5 % patch 1 patch Q24H    lidocaine 5 % patch 1 patch Q24H    midodrine tablet 15 mg TID    multivitamin tablet 1 tablet Daily    ondansetron disintegrating tablet 8 mg Q6H PRN    pantoprazole EC tablet 40 mg Daily    promethazine (PHENERGAN) 12.5 mg in dextrose 5 % 50 mL IVPB Q6H PRN    promethazine (PHENERGAN) 6.25 mg in dextrose 5 % 50 mL IVPB Q6H PRN    ramelteon tablet 8 mg Nightly PRN    rifAXIMin tablet 550 mg BID    sevelamer carbonate tablet 800 mg TID WM    sodium bicarbonate tablet 1,300 mg TID    sodium chloride 0.9% flush 5 mL PRN    thiamine tablet 100 mg Daily    traMADol tablet 50 mg Q8H PRN       Objective:     Vital Signs (Most Recent):  Temp: (!) 95.6 °F (35.3 °C) (11/05/18 1432)  Pulse: 81 (11/05/18 1507)  Resp: 18 (11/05/18 1507)  BP: (!) 93/54 (11/05/18 1432)  SpO2:  100 % (11/05/18 1507)  O2 Device (Oxygen Therapy): room air (11/05/18 1507) Vital Signs (24h Range):  Temp:  [95.6 °F (35.3 °C)-98.7 °F (37.1 °C)] 95.6 °F (35.3 °C)  Pulse:  [75-94] 81  Resp:  [16-20] 18  SpO2:  [92 %-100 %] 100 %  BP: ()/(47-61) 93/54     Weight: 82.6 kg (182 lb 3.4 oz) (11/05/18 0400)  Body mass index is 26.14 kg/m².  Body surface area is 2.02 meters squared.    I/O last 3 completed shifts:  In: 2160 [P.O.:2060; IV Piggyback:100]  Out: 200 [Urine:200]    Physical Exam   Constitutional: He appears well-developed. He appears cachectic. He is cooperative. No distress.   Temporal musc wasting   HENT:   Head: Normocephalic and atraumatic.   Eyes: Conjunctivae are normal. Pupils are equal, round, and reactive to light.   Neck: Trachea normal and normal range of motion. Neck supple. No JVD present.   Cardiovascular: Normal rate, regular rhythm, S1 normal, S2 normal and normal pulses. Exam reveals no gallop and no friction rub.   No murmur heard.  Pulmonary/Chest: Effort normal. He has decreased breath sounds in the right middle field, the right lower field and the left lower field. He has no rhonchi.   Abdominal: Soft. Bowel sounds are normal. He exhibits distension and ascites. There is no tenderness.   Musculoskeletal: Normal range of motion. He exhibits no edema.   Neurological: He is alert.   Skin: Skin is warm and dry. Capillary refill takes less than 2 seconds.   Psychiatric: He has a normal mood and affect. His behavior is normal.   Vitals reviewed.      Significant Labs:  ABGs: No results for input(s): PH, PCO2, HCO3, POCSATURATED, BE in the last 168 hours.  BMP:   Recent Labs   Lab 11/05/18  0350      CL 99   CO2 21*   BUN 77*   CREATININE 8.1*   CALCIUM 8.3*   MG 2.1     Cardiac Markers: No results for input(s): CKMB, TROPONINT, MYOGLOBIN in the last 168 hours.  CBC:   Recent Labs   Lab 11/05/18  0350   WBC 15.97*   RBC 3.08*   HGB 10.2*   HCT 30.0*   PLT 68*   MCV 97   MCH 33.1*    MCHC 34.0     CMP:   Recent Labs   Lab 11/05/18  0350      CALCIUM 8.3*   ALBUMIN 2.2*   PROT 4.9*   *   K 4.7   CO2 21*   CL 99   BUN 77*   CREATININE 8.1*   ALKPHOS 213*   ALT 32   AST 69*   BILITOT 35.9*     Coagulation:   Recent Labs   Lab 11/05/18  0350   INR 2.1*     No results for input(s): COLORU, CLARITYU, SPECGRAV, PHUR, PROTEINUA, GLUCOSEU, BILIRUBINCON, BLOODU, WBCU, RBCU, BACTERIA, MUCUS, NITRITE, LEUKOCYTESUR, UROBILINOGEN, HYALINECASTS in the last 168 hours.  All labs within the past 24 hours have been reviewed.     Significant Imaging:  Labs: Reviewed  US: Reviewed

## 2018-11-05 NOTE — PROGRESS NOTES
[facility]  Psychiatry  Progress Note    Patient Name: Femi Enicso  MRN: [id number]   Code Status: Full Code  Admission Date: 10/27/2018  Hospital Length of Stay: 9 days  Expected Discharge Date: 11/9/2018  Attending Physician: Dante Cash MD  Primary Care Provider: Primary Doctor No    Current Legal Status: N/A    Patient information was obtained from patient, spouse/SO and past medical records.     Subjective:     Principal Problem:Pre-transplant evaluation for liver transplant    HPI: Femi Enciso is a 53 y.o. male with a past psychiatric history of alcohol use disorder and anxiety, who presented to [facility] due to [facility] likely 2/2 EtOH abuse. Psychiatry was consulted to address the patient's symptoms of substance abuse and candidacy for liver transplant.     Per Primary MD:  54 y/o man with DM2, recent diagnosis of liver failure likely secondary to EtOH abuse in 09/2018, DEL progressing to ESRD admitted to the medicine team for transplant evaluation.   Of note, he was previous in fair health until in September, N/V and diarrhea after sanwitch, resolved, then found have increasing jaundice. Went to hospital, found to have ALI, while he also got hypoxic, CT chest showed large pleural effusion, he was admitted, underwent thoracentesis(removed about 1L), cell study negative for malignancy, and he was treated for PNA and C.diff as well. However, he continue to have weight loss, and start to have AMS, readmitted to hospital, found to have DEL despite ALI. So he was transferred here for eval of liver tx.  Last drink was 9/21/2018.   Denies any fever or chills or chest pain or abdominal pain.      Per Hepatology MD:  History obtain from chart review (records in care everywhere) and from speaking to daughter/wife who are both RN's.  Patient's wife states that he was in fair health until the end of September when they were on vacation (on 9/21/18 they went to [facility]).  She states that after eating a  "sandwhich, patient became very ill and started having diarrhea, nausea, and emesis (wife denies seeing patient consume alcohol during this time).  Eventually  gastroenteritis cleared up, however patient became jaundice and went to go see his GI doctor and was found to have elevated LFTs and hypoxic and was admitted to their local hospital.  Patient had a large right sided effusion which was tapped (1L negative cytology) and he was treated for a PNA.  Patient had a non contrasted abdominal CT at that time which showed multiple hypodensities and US of the abdomen was recommend which no discrete lesions. Furthermore he was also started on steroids for alcoholic hepatitis which were stopped when patient did not improve.      Referral sent to [facility] for liver transplant evaluation and he was approved for outpatient evaluation as well as appointment with Dr. Sharma. On 10/26 [facility] nurse spoke to patient's wife and reported that patient was worsening our team recommend going to the hospital for admission. Family instead got on a commercial flight and brought patient to ED.     Social History:  Patient has been a binge drinker for ~20+ years. When he binges he drinks a 5th of Vodka per day for a couple of days and then stops. He drinks to create an "alternative reality" per wife. He was sober from [id number]. In 2014 daughter had a stroke and he subsequently entered an [facility] for Depression and Alcohol dependence. He also worked with a counselor during this period who eventually discharged him from counseling. Of note has tried AA in the past but has not been effective. Family has not seen him drink in ~ 6 months however they did find a debit card transaction for alcohol purchase on 7/28/18 as well as an empty 5th of Vodka (this was after spending ~ 1 month with his daughter). He follows with a local PCP who did mention his LFT's were elevated last year and he subsequently stopped with resolution of his LFT's. No mention of " "cirrhosis until this September. In March 2018 he quit his job as a  as he was not happy with his job. Family feels that around this time is when he began to decline. Prior to September they reported intermittent episodes of confusion and inappropriateness. No DUI or incarcerations.    Per Addiction Psych MD:  I have seen and interviewed the patient, in conjunction with the medical student, and we have collaborated to create this documentation of the patient encounter. The patient was seen and evaluated by me, chart reviewed. The medical student interviewed the patient, first, and then I performed my assessment - our findings are integrated, below. We discussed the patient's evaluation and devised an assessment/plan. The medical student documented parts of the note, with supervision and editing.    Pt was diagnosed with cirhosis about 1 week ago in [place] by his GI Dr. Janelle Mcqueen in [facility] after the pt presented w/ DEL and bilateral pleural effusions, after which he was found to be in liver failure. Since then the family has decided to come to [facility] to evaluate pt for liver transplant. Pt's last drink was 9/21/2018. He has understanding of his condition, "my liver is shot because I drink too much". Pt has been drinking heavily since 1995, prior to which he was drinking socially. He used to play competitive tennis and the drinking started after their team won their matches. He endorses drinking about 0.6 L of vodka per day "1.75L lasted me about 3 days". Pt has tried unsuccessfully to quit in the past with 12 attempts but never attended rehab and decided to go "cold turkey" which need in relapse. He had one successful quitting with IOP program in [place] 5 years ago and is willing to retry that again. He never went into severe withdrawal such as DT or hallucinosis but felt agitated and a general feeling of malaise when he tried to quit. He has attended AA meetings but doesn't like them " "and claims that they are very "morbid" and "limited in [place]" and he "has heard it all". He verbalizes understanding that he needs to make a lifelong commitment to sobriety if he wants a new liver. Daughter and wife at bedside have done some research into online AA meetings and finding a sponsor that the pt can connect with. Pt states that he his motivated to quit and wants to engage in the program after his medical issues subside. He also has a mental health counselor back in [place] that he is willing to re-engage with.      Substance Abuse History:  Substance of Choice: alcohol  Substances Used: Marijuana in high school  History of IVDU?: No  Use of Alcohol: Yes - last drink 9/21/1987  Average Consumption: 1.75L vodka / 3d  Last Drink: last drink 9/21/1987  Tobacco: No  History of Withdrawals: Yes - agitated and feelings of malaise but no hallucinosis or DT  History of Detox: Yes - cold turkey & once w/ an IOP program successfully after daughter's stroke  Rehab History: No, however did do intensive outpt program  AA/NA: Yes -   Spouse/Partner Consumption: Yes - socially  Patient Aware of Biomedical Complications: Yes     DSM-5 Substance Use Disorder Criteria:  1. Often take in larger amounts or over a longer period of time than was intended: No  2. Persistent desire or unsuccessful efforts to cut down or control use: Yes  3. Great deal of time spent in activities necessary to obtain substance, use, or recover from effects: Yes  4. Craving/strong desire for substance or urge to use: No  5. Use resulting in failure to fulfill major role obligations at home, work or school: Yes  6. Social, occupational, recreational activities decreased because of use: Yes  7. Continued use despite having persistent or recurrent social or interpersonal problems cause or exaserbated by the substance: Yes  8. Recurrent use in situations in which it is physically hazardous: Yes  9. Use despite physical or psychological problems that " are likely to have been caused or exacerbated by the substance: Yes  10. Tolerance, as defined by either of the following: No              A. A need for markedly increased amounts of substance to achieve intoxication or desired effect. -OR-               B. A markedly diminished effect with continued use of the same amount of substance.  11. Withdrawal, as manifested by the following: Yes              A. The characteristic withdrawal syndrome for substance. -AND-              B. Substance is taken to relieve or avoid withdrawal symptoms.  Mild (1-3), Moderate (4-5), Severe (?6)     Psychiatric History:  Diagnoses:  MDD and anxiety  Previous Medication Trials: yes, Lexapro 20 mg   Previous Psychiatric Hospitalizations: yes, 3 day stay at [facility]  Previous Suicide Attempts: no   History of Violence: no  Outpatient Psychiatrist: no     Social History:  Marital Status:   Children: 2   Employment Status: retired  Education: college graduate  Special Ed: no   History: no  Housing Status: own home in [place]  Financial Status: Stable  Leisure/Recreation: tennis, Tv, family  Childhood History: good  History of Physical/Sexual Abuse: no  Access to Gun: yes     Legal History:  Past Charges/Incarcerations: no   Pending Charges: no      Family Psychiatric History:   yes, youngest daughter has anxiety     Psychiatric Review Of Systems:  sleep: yes, increased  appetite: yes, decreased  weight: yes, lost weight  energy/anergy: yes, low energy  interest/pleasure/anhedonia: yes, lack of interest  somatic symptoms: yes, headaches  guilty/hopelessness: yes  concentration: yes, decreased  S.I.B.s/risky behavior: no  SI/SA:  no     anxiety/panic: yes, no known trigger  Agoraphobia:  no  Social phobia:  no  Recurrent nightmares:  no  hyper startle response:  no  Avoidance: no  Recurrent thoughts:  no  Recurrent behaviors:  no     Irritability: no  Racing thoughts: no  Impulsive behaviors: no  Pressured speech:   "no     Paranoia:no  Delusions: no  AVH:no     Medical Review Of Systems:  Negative except as above     Collateral:   yes      Hospital Course: 11/5/18:  Chart reviewed. Upon initiation of interview, pt was lying in bed, dressed in hospital gown. No distress noted, patient agreeable and cooperative with interview. No acute events overnight. Wife was at bedside. Patient states he is is feeling "fine" this morning. Addiction Psych was consulted for this patient again due to an elevated Peth test. The Peth test was slightly elevated above normal at 23. Upon further questioning he is adamant that his last drink was on 9/21/18 and has not had a drink since then. He says that he does not want to waste this opportunity for a new liver by drinking alcohol again. Patient and wife state that they are committed to alcohol cessation, even inquiring about starting counseling over the internet while in the hospital. States that they plan to attend an IOP upon discharge. Patient reports sleeping "alright" though states that his days and nights are somewhat mixed up. Denies any changes to appetite and states it is fine. No somatic complaints. Patient has been compliant with medications.Tolerating medications without adverse side effects. Denies SI, HI, AVH, delusions, or paranoia. No psychiatric PRNs required.         Interval History: Per hospital course    Family History     None        Tobacco Use    Smoking status: Never Smoker   Substance and Sexual Activity    Alcohol use: Not on file    Drug use: Not on file    Sexual activity: Not on file     Psychotherapeutics (From admission, onward)    Start     Stop Route Frequency Ordered    11/01/18 2110  ramelteon tablet 8 mg      -- Oral Nightly PRN 11/01/18 2113           Review of Systems  Objective:     Vital Signs (Most Recent):  Temp: (!) 95.6 °F (35.3 °C) (11/05/18 1432)  Pulse: 82 (11/05/18 1432)  Resp: 16 (11/05/18 1432)  BP: (!) 93/54 (11/05/18 1432)  SpO2: (!) 92 % " "(11/05/18 1105) Vital Signs (24h Range):  Temp:  [95.6 °F (35.3 °C)-98.7 °F (37.1 °C)] 95.6 °F (35.3 °C)  Pulse:  [75-94] 82  Resp:  [16-20] 16  SpO2:  [92 %-95 %] 92 %  BP: ()/(47-61) 93/54     Height: 5' 10" (177.8 cm)  Weight: 82.6 kg (182 lb 3.4 oz)  Body mass index is 26.14 kg/m².      Intake/Output Summary (Last 24 hours) at 11/5/2018 1503  Last data filed at 11/5/2018 1015  Gross per 24 hour   Intake 2210 ml   Output 1531 ml   Net 679 ml       Physical Exam   Psychiatric:   Mental Status Exam  Appearance: older than stated age, bearded, lying in bed, thin & gaunt looking  Level of Consciousness: alert  Behavior/Cooperation: normal, friendly and cooperative  Psychomotor: within normal limits   Speech: normal tone, normal rate, normal pitch, normal volume  Language: english, fluid  Orientation: grossly intact, person, place, situation, month of year, stated date of 2019  Attention Span/Concentration: CAM-ICU negative  Memory: Registers and recalls 3/3 objects at 1 and 5 minutes  Mood: "neutral"  Affect: euthymic  Thought Process: linear, normal and logical  Associations: normal and logical  Thought Content: normal, no suicidality, no homicidality, delusions, or paranoia  Fund of Knowledge: Aware of current events  Abstraction: proverbs were abstract, similarities were concrete  Insight: good  Judgment: good        Nursing note and vitals reviewed.       Significant Labs:    Recent Results (from the past 48 hour(s))   POCT glucose    Collection Time: 11/03/18  4:47 PM   Result Value Ref Range    POCT Glucose 160 (H) 70 - 110 mg/dL   POCT glucose    Collection Time: 11/03/18  9:19 PM   Result Value Ref Range    POCT Glucose 148 (H) 70 - 110 mg/dL   Phosphorus    Collection Time: 11/04/18  4:24 AM   Result Value Ref Range    Phosphorus 5.0 (H) 2.7 - 4.5 mg/dL   CBC auto differential    Collection Time: 11/04/18  4:24 AM   Result Value Ref Range    WBC 11.75 3.90 - 12.70 K/uL    RBC 3.16 (L) 4.60 - 6.20 M/uL "    Hemoglobin 10.4 (L) 14.0 - 18.0 g/dL    Hematocrit 30.6 (L) 40.0 - 54.0 %    MCV 97 82 - 98 fL    MCH 32.9 (H) 27.0 - 31.0 pg    MCHC 34.0 32.0 - 36.0 g/dL    RDW 19.4 (H) 11.5 - 14.5 %    Platelets 52 (L) 150 - 350 K/uL    MPV 12.2 9.2 - 12.9 fL    Immature Granulocytes 1.3 (H) 0.0 - 0.5 %    Gran # (ANC) 8.1 (H) 1.8 - 7.7 K/uL    Immature Grans (Abs) 0.15 (H) 0.00 - 0.04 K/uL    Lymph # 1.2 1.0 - 4.8 K/uL    Mono # 1.6 (H) 0.3 - 1.0 K/uL    Eos # 0.6 (H) 0.0 - 0.5 K/uL    Baso # 0.09 0.00 - 0.20 K/uL    nRBC 0 0 /100 WBC    Gran% 69.3 38.0 - 73.0 %    Lymph% 10.2 (L) 18.0 - 48.0 %    Mono% 13.4 4.0 - 15.0 %    Eosinophil% 5.0 0.0 - 8.0 %    Basophil% 0.8 0.0 - 1.9 %    Differential Method Automated    Comprehensive metabolic panel    Collection Time: 11/04/18  4:24 AM   Result Value Ref Range    Sodium 133 (L) 136 - 145 mmol/L    Potassium 4.4 3.5 - 5.1 mmol/L    Chloride 99 95 - 110 mmol/L    CO2 23 23 - 29 mmol/L    Glucose 88 70 - 110 mg/dL    BUN, Bld 64 (H) 6 - 20 mg/dL    Creatinine 7.0 (H) 0.5 - 1.4 mg/dL    Calcium 8.5 (L) 8.7 - 10.5 mg/dL    Total Protein 5.1 (L) 6.0 - 8.4 g/dL    Albumin 2.4 (L) 3.5 - 5.2 g/dL    Total Bilirubin 33.4 (H) 0.1 - 1.0 mg/dL    Alkaline Phosphatase 211 (H) 55 - 135 U/L    AST 72 (H) 10 - 40 U/L    ALT 33 10 - 44 U/L    Anion Gap 11 8 - 16 mmol/L    eGFR if African American 9.4 (A) >60 mL/min/1.73 m^2    eGFR if non  8.1 (A) >60 mL/min/1.73 m^2   Protime-INR    Collection Time: 11/04/18  4:24 AM   Result Value Ref Range    Prothrombin Time 19.1 (H) 9.0 - 12.5 sec    INR 2.0 (H) 0.8 - 1.2   Magnesium    Collection Time: 11/04/18  4:24 AM   Result Value Ref Range    Magnesium 2.2 1.6 - 2.6 mg/dL   POCT glucose    Collection Time: 11/04/18  7:34 AM   Result Value Ref Range    POCT Glucose 93 70 - 110 mg/dL   POCT TB Skin Test Read    Collection Time: 11/04/18  9:19 AM   Result Value Ref Range    TB Induration 48 - 72 hr read 0 mm   POCT glucose    Collection  Time: 11/04/18 11:29 AM   Result Value Ref Range    POCT Glucose 99 70 - 110 mg/dL   POCT glucose    Collection Time: 11/04/18  4:48 PM   Result Value Ref Range    POCT Glucose 155 (H) 70 - 110 mg/dL   POCT glucose    Collection Time: 11/04/18  8:50 PM   Result Value Ref Range    POCT Glucose 136 (H) 70 - 110 mg/dL   Phosphorus    Collection Time: 11/05/18  3:50 AM   Result Value Ref Range    Phosphorus 5.2 (H) 2.7 - 4.5 mg/dL   CBC auto differential    Collection Time: 11/05/18  3:50 AM   Result Value Ref Range    WBC 15.97 (H) 3.90 - 12.70 K/uL    RBC 3.08 (L) 4.60 - 6.20 M/uL    Hemoglobin 10.2 (L) 14.0 - 18.0 g/dL    Hematocrit 30.0 (L) 40.0 - 54.0 %    MCV 97 82 - 98 fL    MCH 33.1 (H) 27.0 - 31.0 pg    MCHC 34.0 32.0 - 36.0 g/dL    RDW 19.3 (H) 11.5 - 14.5 %    Platelets 68 (L) 150 - 350 K/uL    MPV 13.1 (H) 9.2 - 12.9 fL    Immature Granulocytes 1.3 (H) 0.0 - 0.5 %    Gran # (ANC) 11.8 (H) 1.8 - 7.7 K/uL    Immature Grans (Abs) 0.21 (H) 0.00 - 0.04 K/uL    Lymph # 1.2 1.0 - 4.8 K/uL    Mono # 2.1 (H) 0.3 - 1.0 K/uL    Eos # 0.5 0.0 - 0.5 K/uL    Baso # 0.09 0.00 - 0.20 K/uL    nRBC 0 0 /100 WBC    Gran% 74.1 (H) 38.0 - 73.0 %    Lymph% 7.8 (L) 18.0 - 48.0 %    Mono% 13.1 4.0 - 15.0 %    Eosinophil% 3.1 0.0 - 8.0 %    Basophil% 0.6 0.0 - 1.9 %    Platelet Estimate Decreased (A)     Differential Method Automated    Comprehensive metabolic panel    Collection Time: 11/05/18  3:50 AM   Result Value Ref Range    Sodium 133 (L) 136 - 145 mmol/L    Potassium 4.7 3.5 - 5.1 mmol/L    Chloride 99 95 - 110 mmol/L    CO2 21 (L) 23 - 29 mmol/L    Glucose 110 70 - 110 mg/dL    BUN, Bld 77 (H) 6 - 20 mg/dL    Creatinine 8.1 (H) 0.5 - 1.4 mg/dL    Calcium 8.3 (L) 8.7 - 10.5 mg/dL    Total Protein 4.9 (L) 6.0 - 8.4 g/dL    Albumin 2.2 (L) 3.5 - 5.2 g/dL    Total Bilirubin 35.9 (H) 0.1 - 1.0 mg/dL    Alkaline Phosphatase 213 (H) 55 - 135 U/L    AST 69 (H) 10 - 40 U/L    ALT 32 10 - 44 U/L    Anion Gap 13 8 - 16 mmol/L    eGFR  if  7.9 (A) >60 mL/min/1.73 m^2    eGFR if non  6.8 (A) >60 mL/min/1.73 m^2   Protime-INR    Collection Time: 11/05/18  3:50 AM   Result Value Ref Range    Prothrombin Time 20.2 (H) 9.0 - 12.5 sec    INR 2.1 (H) 0.8 - 1.2   Magnesium    Collection Time: 11/05/18  3:50 AM   Result Value Ref Range    Magnesium 2.1 1.6 - 2.6 mg/dL   POCT glucose    Collection Time: 11/05/18  9:56 AM   Result Value Ref Range    POCT Glucose 119 (H) 70 - 110 mg/dL      No results found for: PHENYTOIN, PHENOBARB, VALPROATE, CBMZ      Significant Imaging: I have reviewed all pertinent imaging results/findings within the past 24 hours.    Assessment/Plan:     Alcohol use disorder, severe, in early remission    ASSESSMENT     Femi Enciso is a 53 y.o. male with a past psychiatric history of alcohol use disorder and anxiety, who presented [place] [facility] due to AVA likely 2/2 EtOH abuse. Psychiatry was consulted to address the patient's symptoms of substance abuse and candidacy for liver transplant. Pt reports discovering approx 5 yrs ago from his PCP that he had abnormal laboratory findings (transaminitis) indicative of EtOH abuse, and advised that he needed to stop drinking. Thereafter, pt reports a period of 2.5yrs of sobriety in the setting of outpatient counseling and AA meetings, only to relapse, thereafter, following a CVA experienced by his daughter. Pt claims said experience led him into a bout of depression, which required a brief inpatient psychiatric hospitalization for medical stabilization, however he continued to drink, until 9/21/2018, when he was hospitalized and diagnosed with C diff colitis, ALI, and DEL. Pt was then sent to [facility] [place] liver transplant evaluation. Pt exhibits stable/improving inisght/judgment into his addiction evidenced by his decision to quit drinking, upon most recent hospitalization and discovery of alcohol induced liver dysfunction. Pt also possesses strong familial support evidenced  by multiple family members at bedside, willing to provide pt with any/all support required to in aid a full recovery. Pt willing to remain abstinent from EtOH, attend and complete an IOP program, receive serial PETH and Utox screening, as well as attend AA meetings, thereafter.     IMPRESSION  Alcohol use disorder, severe, in early remission  Anxiety disorder, unspecified  Depressive disorder, unspecified    RECOMMENDATION(S)     1. Scheduled Medication(s):  Per primary team    2. PRN Medication(s):  Per primary team    3. Other:  · Moderate risk at this time as long as patient continues to be committed to attending IOP upon medical stability.   · Endorses poor sleep, can start Remeron 15 mg PO nightly.  · Recommend serial PETH testing. PETH of 23 is not overly concerning given patient's admitted last alcohol use on 9/21/18  · Recommend serial urine toxicology screening  · Recommend referral to and completion of day program for education on alcohol abuse, for coping/behavioral skills to reinforce sobriety, as well as to attend AA meetings.            Need for Continued Hospitalization:   No need for inpatient psychiatric hospitalization. Continue medical care as per the primary team.    Anticipated Disposition: Still a Patient     Total time:  35 with greater than 50% of this time spent in counseling and/or coordination of care.       Oscar Davis MD   Psychiatry  [facility]

## 2018-11-05 NOTE — PLAN OF CARE
Problem: Patient Care Overview  Goal: Plan of Care Review  Outcome: Ongoing (interventions implemented as appropriate)  POC reviewed with spouse and patient. Hemodialysis done removed 0.981L. Perm. Catheter on 11/7/18. Question and concerns addressed. Safety maintained.  Call light in reach. Denies nausea/vomiting VVS. WCTM

## 2018-11-05 NOTE — PLAN OF CARE
Problem: Hemodialysis (Adult)  Goal: Signs and Symptoms of Listed Potential Problems Will be Absent, Minimized or Managed (Hemodialysis)  Signs and symptoms of listed potential problems will be absent, minimized or managed by discharge/transition of care (reference Hemodialysis (Adult) CPG).  Awaiting dialysis at this time. Access without complication.

## 2018-11-05 NOTE — CONSULTS
Consult received. Full consult note to follow by Psychiatry Addiction Service.     Manda Cordova MD  Psychiatry PGY-2

## 2018-11-05 NOTE — SUBJECTIVE & OBJECTIVE
"Interval History: Per hospital course    Family History     None        Tobacco Use    Smoking status: Never Smoker   Substance and Sexual Activity    Alcohol use: Not on file    Drug use: Not on file    Sexual activity: Not on file     Psychotherapeutics (From admission, onward)    Start     Stop Route Frequency Ordered    11/01/18 2110  ramelteon tablet 8 mg      -- Oral Nightly PRN 11/01/18 2113           Review of Systems  Objective:     Vital Signs (Most Recent):  Temp: (!) 95.6 °F (35.3 °C) (11/05/18 1432)  Pulse: 82 (11/05/18 1432)  Resp: 16 (11/05/18 1432)  BP: (!) 93/54 (11/05/18 1432)  SpO2: (!) 92 % (11/05/18 1105) Vital Signs (24h Range):  Temp:  [95.6 °F (35.3 °C)-98.7 °F (37.1 °C)] 95.6 °F (35.3 °C)  Pulse:  [75-94] 82  Resp:  [16-20] 16  SpO2:  [92 %-95 %] 92 %  BP: ()/(47-61) 93/54     Height: 5' 10" (177.8 cm)  Weight: 82.6 kg (182 lb 3.4 oz)  Body mass index is 26.14 kg/m².      Intake/Output Summary (Last 24 hours) at 11/5/2018 1503  Last data filed at 11/5/2018 1015  Gross per 24 hour   Intake 2210 ml   Output 1531 ml   Net 679 ml       Physical Exam   Psychiatric:   Mental Status Exam  Appearance: older than stated age, bearded, lying in bed, thin & gaunt looking  Level of Consciousness: alert  Behavior/Cooperation: normal, friendly and cooperative  Psychomotor: within normal limits   Speech: normal tone, normal rate, normal pitch, normal volume  Language: english, fluid  Orientation: grossly intact, person, place, situation, month of year, stated date of 2019  Attention Span/Concentration: CAM-ICU negative  Memory: Registers and recalls 3/3 objects at 1 and 5 minutes  Mood: "neutral"  Affect: euthymic  Thought Process: linear, normal and logical  Associations: normal and logical  Thought Content: normal, no suicidality, no homicidality, delusions, or paranoia  Fund of Knowledge: Aware of current events  Abstraction: proverbs were abstract, similarities were concrete  Insight: " good  Judgment: good        Nursing note and vitals reviewed.       Significant Labs:    Recent Results (from the past 48 hour(s))   POCT glucose    Collection Time: 11/03/18  4:47 PM   Result Value Ref Range    POCT Glucose 160 (H) 70 - 110 mg/dL   POCT glucose    Collection Time: 11/03/18  9:19 PM   Result Value Ref Range    POCT Glucose 148 (H) 70 - 110 mg/dL   Phosphorus    Collection Time: 11/04/18  4:24 AM   Result Value Ref Range    Phosphorus 5.0 (H) 2.7 - 4.5 mg/dL   CBC auto differential    Collection Time: 11/04/18  4:24 AM   Result Value Ref Range    WBC 11.75 3.90 - 12.70 K/uL    RBC 3.16 (L) 4.60 - 6.20 M/uL    Hemoglobin 10.4 (L) 14.0 - 18.0 g/dL    Hematocrit 30.6 (L) 40.0 - 54.0 %    MCV 97 82 - 98 fL    MCH 32.9 (H) 27.0 - 31.0 pg    MCHC 34.0 32.0 - 36.0 g/dL    RDW 19.4 (H) 11.5 - 14.5 %    Platelets 52 (L) 150 - 350 K/uL    MPV 12.2 9.2 - 12.9 fL    Immature Granulocytes 1.3 (H) 0.0 - 0.5 %    Gran # (ANC) 8.1 (H) 1.8 - 7.7 K/uL    Immature Grans (Abs) 0.15 (H) 0.00 - 0.04 K/uL    Lymph # 1.2 1.0 - 4.8 K/uL    Mono # 1.6 (H) 0.3 - 1.0 K/uL    Eos # 0.6 (H) 0.0 - 0.5 K/uL    Baso # 0.09 0.00 - 0.20 K/uL    nRBC 0 0 /100 WBC    Gran% 69.3 38.0 - 73.0 %    Lymph% 10.2 (L) 18.0 - 48.0 %    Mono% 13.4 4.0 - 15.0 %    Eosinophil% 5.0 0.0 - 8.0 %    Basophil% 0.8 0.0 - 1.9 %    Differential Method Automated    Comprehensive metabolic panel    Collection Time: 11/04/18  4:24 AM   Result Value Ref Range    Sodium 133 (L) 136 - 145 mmol/L    Potassium 4.4 3.5 - 5.1 mmol/L    Chloride 99 95 - 110 mmol/L    CO2 23 23 - 29 mmol/L    Glucose 88 70 - 110 mg/dL    BUN, Bld 64 (H) 6 - 20 mg/dL    Creatinine 7.0 (H) 0.5 - 1.4 mg/dL    Calcium 8.5 (L) 8.7 - 10.5 mg/dL    Total Protein 5.1 (L) 6.0 - 8.4 g/dL    Albumin 2.4 (L) 3.5 - 5.2 g/dL    Total Bilirubin 33.4 (H) 0.1 - 1.0 mg/dL    Alkaline Phosphatase 211 (H) 55 - 135 U/L    AST 72 (H) 10 - 40 U/L    ALT 33 10 - 44 U/L    Anion Gap 11 8 - 16 mmol/L    eGFR  if  9.4 (A) >60 mL/min/1.73 m^2    eGFR if non  8.1 (A) >60 mL/min/1.73 m^2   Protime-INR    Collection Time: 11/04/18  4:24 AM   Result Value Ref Range    Prothrombin Time 19.1 (H) 9.0 - 12.5 sec    INR 2.0 (H) 0.8 - 1.2   Magnesium    Collection Time: 11/04/18  4:24 AM   Result Value Ref Range    Magnesium 2.2 1.6 - 2.6 mg/dL   POCT glucose    Collection Time: 11/04/18  7:34 AM   Result Value Ref Range    POCT Glucose 93 70 - 110 mg/dL   POCT TB Skin Test Read    Collection Time: 11/04/18  9:19 AM   Result Value Ref Range    TB Induration 48 - 72 hr read 0 mm   POCT glucose    Collection Time: 11/04/18 11:29 AM   Result Value Ref Range    POCT Glucose 99 70 - 110 mg/dL   POCT glucose    Collection Time: 11/04/18  4:48 PM   Result Value Ref Range    POCT Glucose 155 (H) 70 - 110 mg/dL   POCT glucose    Collection Time: 11/04/18  8:50 PM   Result Value Ref Range    POCT Glucose 136 (H) 70 - 110 mg/dL   Phosphorus    Collection Time: 11/05/18  3:50 AM   Result Value Ref Range    Phosphorus 5.2 (H) 2.7 - 4.5 mg/dL   CBC auto differential    Collection Time: 11/05/18  3:50 AM   Result Value Ref Range    WBC 15.97 (H) 3.90 - 12.70 K/uL    RBC 3.08 (L) 4.60 - 6.20 M/uL    Hemoglobin 10.2 (L) 14.0 - 18.0 g/dL    Hematocrit 30.0 (L) 40.0 - 54.0 %    MCV 97 82 - 98 fL    MCH 33.1 (H) 27.0 - 31.0 pg    MCHC 34.0 32.0 - 36.0 g/dL    RDW 19.3 (H) 11.5 - 14.5 %    Platelets 68 (L) 150 - 350 K/uL    MPV 13.1 (H) 9.2 - 12.9 fL    Immature Granulocytes 1.3 (H) 0.0 - 0.5 %    Gran # (ANC) 11.8 (H) 1.8 - 7.7 K/uL    Immature Grans (Abs) 0.21 (H) 0.00 - 0.04 K/uL    Lymph # 1.2 1.0 - 4.8 K/uL    Mono # 2.1 (H) 0.3 - 1.0 K/uL    Eos # 0.5 0.0 - 0.5 K/uL    Baso # 0.09 0.00 - 0.20 K/uL    nRBC 0 0 /100 WBC    Gran% 74.1 (H) 38.0 - 73.0 %    Lymph% 7.8 (L) 18.0 - 48.0 %    Mono% 13.1 4.0 - 15.0 %    Eosinophil% 3.1 0.0 - 8.0 %    Basophil% 0.6 0.0 - 1.9 %    Platelet Estimate Decreased (A)      Differential Method Automated    Comprehensive metabolic panel    Collection Time: 11/05/18  3:50 AM   Result Value Ref Range    Sodium 133 (L) 136 - 145 mmol/L    Potassium 4.7 3.5 - 5.1 mmol/L    Chloride 99 95 - 110 mmol/L    CO2 21 (L) 23 - 29 mmol/L    Glucose 110 70 - 110 mg/dL    BUN, Bld 77 (H) 6 - 20 mg/dL    Creatinine 8.1 (H) 0.5 - 1.4 mg/dL    Calcium 8.3 (L) 8.7 - 10.5 mg/dL    Total Protein 4.9 (L) 6.0 - 8.4 g/dL    Albumin 2.2 (L) 3.5 - 5.2 g/dL    Total Bilirubin 35.9 (H) 0.1 - 1.0 mg/dL    Alkaline Phosphatase 213 (H) 55 - 135 U/L    AST 69 (H) 10 - 40 U/L    ALT 32 10 - 44 U/L    Anion Gap 13 8 - 16 mmol/L    eGFR if African American 7.9 (A) >60 mL/min/1.73 m^2    eGFR if non  6.8 (A) >60 mL/min/1.73 m^2   Protime-INR    Collection Time: 11/05/18  3:50 AM   Result Value Ref Range    Prothrombin Time 20.2 (H) 9.0 - 12.5 sec    INR 2.1 (H) 0.8 - 1.2   Magnesium    Collection Time: 11/05/18  3:50 AM   Result Value Ref Range    Magnesium 2.1 1.6 - 2.6 mg/dL   POCT glucose    Collection Time: 11/05/18  9:56 AM   Result Value Ref Range    POCT Glucose 119 (H) 70 - 110 mg/dL      No results found for: PHENYTOIN, PHENOBARB, VALPROATE, CBMZ      Significant Imaging: I have reviewed all pertinent imaging results/findings within the past 24 hours.

## 2018-11-05 NOTE — CONSULTS
Patient seen and evaluated. Chart Reviewed. Please see progress note on 11/5/18 for updated evaluation and plan. For full consult note, see evaluation performed on 10/30/18. Thank you.    Oscar Davis MD  [facility] PGY 2

## 2018-11-05 NOTE — HOSPITAL COURSE
"11/5/18:  Chart reviewed. Upon initiation of interview, pt was lying in bed, dressed in hospital gown. No distress noted, patient agreeable and cooperative with interview. No acute events overnight. Wife was at bedside. Patient states he is is feeling "fine" this morning. Addiction Psych was consulted for this patient again due to an elevated Peth test. The Peth test was slightly elevated above normal at 23. Upon further questioning he is adamant that his last drink was on 9/21/18 and has not had a drink since then. He says that he does not want to waste this opportunity for a new liver by drinking alcohol again. Patient and wife state that they are committed to alcohol cessation, even inquiring about starting counseling over the internet while in the hospital. States that they plan to attend an IOP upon discharge. Patient reports sleeping "alright" though states that his days and nights are somewhat mixed up. Denies any changes to appetite and states it is fine. No somatic complaints. Patient has been compliant with medications.Tolerating medications without adverse side effects. Denies SI, HI, AVH, delusions, or paranoia. No psychiatric PRNs required.     11/20/2018  Pt was seen and chart reviewed. Mr. Enciso complains of auditory hallucinations that sound like a "cat's meow, gun shots, chicken". Pt reports hearing these sounds throughout the entire day and that they are distressing. He also endorses haivng a desire to eat, but being fearful of swallowing. The pt reports that he has increased anxiety when it comes time to swallow food and is afraid that he will inadvertently harm himself or stop his medical progression. The pt endorses anxiety and an inability to sleep. He denies mood symptoms, SI/HI/VH.     12/04/2018  Chart reviewed. Upon initiation of interview, pt was lying in bed, dressed in hospital gown. No distress noted, patient agreeable and cooperative with interview. No acute events overnight. Patient " "states he is feeling "ok" this morning. Patient reports sleeping more than usual and has a decreased appetite.  Patient has been compliant with medications.    12/06/2018  No distress.  Mental status and level of awareness improved.  No acute events overnight.      12/07/2018  No distress this AM.  Mother reports he didn't sleep well last night, had hallucinations of a cloud and a rabbit on the floor.  Pt does not recall these events.      12/10/2018  Overnight, wife reports pt awoke twice.  Wife states pt has been acting like an "SOB," but denies any violence/biting.      12/12/2018  Awoke twice overnight, which pt and wife attribute to back pain.  States no difficulty falling asleep.    12/13/2018  Awoke several times throughout the night, pt is uncertain why.      12/14/2018  Pt reports several awakenings overnight, which he attributes to back pain.  States anxiety slightly improved by speaking aloud when he feels anxious.    12/17/2018  Reports he slept better on regimen of tramadol, ramelteon, trazodone, tizanidine last night.  Back pain improved.  Wife reports he was "snarky" yesterday, is eager for pt to participate in counseling.  "

## 2018-11-05 NOTE — TREATMENT PLAN
Hepatology Treatment Plan    PETH test resulted positive. We spoke to his wife who states it is unlikely he was drinking in the past few months as he was very sick.    Patient is currently undergoing liver transplant evaluation. He still needs an EGD.    Given positive PETH, we will ask addiction psych to evaluate patient again.    Maurice Williamson MD PGY-V  Gastroenterology Fellow  [facility]  P [phone]

## 2018-11-05 NOTE — MEDICAL/APP STUDENT
"Chart reviewed. Upon initiation of interview, pt was lying in bed, dressed in hospital gown. No distress noted, patient agreeable and cooperative with interview. No acute events overnight. Patient states he is is feeling "fine" this morning. Addiction Psych was consulted for this patient again due to an elevated Peth test. The Peth test was slightly elevated above normal at 27. This is likely due to some left over alcohol in his system from his last reported drink on 9/21/18. Upon further questioning he is adamant that his last drink was on 9/21/18 and has not had a drink since then. He says that he does not want to waste this opportunity for a new liver by drinking alcohol again. The patient was reassured that this elevated result does not change the plan of management for him. Patient reports sleeping "well," and has a "good" appetite. No somatic complaints. Patient has been compliant with medications.Tolerating medications without adverse side effects. Denies SI, HI, AVH, delusions, or paranoia. No psychiatric PRNs required.  "

## 2018-11-05 NOTE — ASSESSMENT & PLAN NOTE
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   Plan:  - No evidence of renal recovery remains anuric.  - HD x 3.5 hrs today for metabolic clearance and volume management seen while on HD but un ablt to complete treatment secondary to catheter issues wuth QB anf venous pressures  - Will plan for Perm Cath on Wednesday  - CXR to evaluate effusion  - UF ~ 1 lt as tolerated  - Remains anuric  - Renal US with adequate size kidneys no hydro  - UCx  Enterococcus Cloacae  > 100 K on Cefepime which is sensitive treatment should compete as per primary team  - UPCR low 0.26 g/g  - Strict I/O and chart  - Avoid nephrotoxic medications  - Maintain MAP >65 please continue midodrine  - please keep Hb > 7 gm/dL  - Medication doses adjusted to GFR  - Phos starting to rise will need Renvela in near future if no evidence of recoverr cont renal diet

## 2018-11-05 NOTE — PROGRESS NOTES
Hemodialysis tx complete, 0.981ml removed in 2.5 hour tx, tolerated well. Full tx of 3.5 hours not complete due to catheter not working properly. Dr. Nino is aware. Blood returned via trialysis catheter, both lumens flushed with NS, capped and taped. Report given to Radha JAMESON. Transported from unit via stretcher by transport

## 2018-11-06 ENCOUNTER — TELEPHONE (OUTPATIENT)
Dept: GASTROENTEROLOGY | Facility: CLINIC | Age: 53
End: 2018-11-06

## 2018-11-06 ENCOUNTER — DOCUMENTATION ONLY (OUTPATIENT)
Dept: PHARMACY | Facility: HOSPITAL | Age: 53
End: 2018-11-06

## 2018-11-06 ENCOUNTER — ANESTHESIA (OUTPATIENT)
Dept: ENDOSCOPY | Facility: HOSPITAL | Age: 53
DRG: 005 | End: 2018-11-06
Payer: COMMERCIAL

## 2018-11-06 DIAGNOSIS — I85.10 SECONDARY ESOPHAGEAL VARICES WITHOUT BLEEDING: Primary | ICD-10-CM

## 2018-11-06 PROBLEM — R21 ACUTE MACULOPAPULAR RASH: Status: ACTIVE | Noted: 2018-11-06

## 2018-11-06 PROBLEM — Z01.818 ENCOUNTER FOR PRE-TRANSPLANT EVALUATION FOR KIDNEY TRANSPLANT: Status: ACTIVE | Noted: 2018-11-06

## 2018-11-06 LAB
ALBUMIN SERPL BCP-MCNC: 2.5 G/DL
ALP SERPL-CCNC: 206 U/L
ALT SERPL W/O P-5'-P-CCNC: 29 U/L
ANION GAP SERPL CALC-SCNC: 16 MMOL/L
AST SERPL-CCNC: 64 U/L
BASOPHILS # BLD AUTO: 0.11 K/UL
BASOPHILS NFR BLD: 0.7 %
BILIRUB SERPL-MCNC: 36.2 MG/DL
BUN SERPL-MCNC: 61 MG/DL
CALCIUM SERPL-MCNC: 8.5 MG/DL
CHLORIDE SERPL-SCNC: 100 MMOL/L
CMV IGG SERPL QL IA: NORMAL
CO2 SERPL-SCNC: 23 MMOL/L
CREAT SERPL-MCNC: 7.2 MG/DL
DIFFERENTIAL METHOD: ABNORMAL
EOSINOPHIL # BLD AUTO: 0.5 K/UL
EOSINOPHIL NFR BLD: 3 %
ERYTHROCYTE [DISTWIDTH] IN BLOOD BY AUTOMATED COUNT: 19.4 %
EST. GFR  (AFRICAN AMERICAN): 9.1 ML/MIN/1.73 M^2
EST. GFR  (NON AFRICAN AMERICAN): 7.9 ML/MIN/1.73 M^2
GLUCOSE SERPL-MCNC: 83 MG/DL
HCT VFR BLD AUTO: 31.3 %
HGB BLD-MCNC: 10.4 G/DL
IMM GRANULOCYTES # BLD AUTO: 0.18 K/UL
IMM GRANULOCYTES NFR BLD AUTO: 1.2 %
INR PPP: 2.3
LACTATE SERPL-SCNC: 1.3 MMOL/L
LYMPHOCYTES # BLD AUTO: 1 K/UL
LYMPHOCYTES NFR BLD: 6.7 %
M TB IFN-G CD4+ BCKGRND COR BLD-ACNC: 0.03 IU/ML
MAGNESIUM SERPL-MCNC: 2.1 MG/DL
MCH RBC QN AUTO: 33.7 PG
MCHC RBC AUTO-ENTMCNC: 33.2 G/DL
MCV RBC AUTO: 101 FL
MITOGEN IGNF BCKGRD COR BLD-ACNC: 0.09 IU/ML
MITOGEN IGNF BCKGRD COR BLD-ACNC: ABNORMAL [IU]/ML
MONOCYTES # BLD AUTO: 2 K/UL
MONOCYTES NFR BLD: 13.3 %
NEUTROPHILS # BLD AUTO: 11.5 K/UL
NEUTROPHILS NFR BLD: 75.1 %
NIL: 0.03 IU/ML
NRBC BLD-RTO: 0 /100 WBC
PHOSPHATE SERPL-MCNC: 5.3 MG/DL
PLATELET # BLD AUTO: 61 K/UL
PMV BLD AUTO: 13.5 FL
POCT GLUCOSE: 119 MG/DL (ref 70–110)
POCT GLUCOSE: 139 MG/DL (ref 70–110)
POCT GLUCOSE: 76 MG/DL (ref 70–110)
POCT GLUCOSE: 77 MG/DL (ref 70–110)
POCT GLUCOSE: 80 MG/DL (ref 70–110)
POTASSIUM SERPL-SCNC: 5.2 MMOL/L
PROCALCITONIN SERPL IA-MCNC: 3.11 NG/ML
PROT SERPL-MCNC: 5.2 G/DL
PROTHROMBIN TIME: 22.3 SEC
RBC # BLD AUTO: 3.09 M/UL
SODIUM SERPL-SCNC: 139 MMOL/L
TB2 - NIL: 0.01 IU/ML
WBC # BLD AUTO: 15.34 K/UL

## 2018-11-06 PROCEDURE — 99233 SBSQ HOSP IP/OBS HIGH 50: CPT | Mod: NTX,,, | Performed by: HOSPITALIST

## 2018-11-06 PROCEDURE — 99223 1ST HOSP IP/OBS HIGH 75: CPT | Mod: NTX,,, | Performed by: PHYSICIAN ASSISTANT

## 2018-11-06 PROCEDURE — 36415 COLL VENOUS BLD VENIPUNCTURE: CPT | Mod: NTX

## 2018-11-06 PROCEDURE — 63600175 PHARM REV CODE 636 W HCPCS: Mod: NTX | Performed by: HOSPITALIST

## 2018-11-06 PROCEDURE — 20600001 HC STEP DOWN PRIVATE ROOM: Mod: NTX

## 2018-11-06 PROCEDURE — 25000003 PHARM REV CODE 250: Mod: NTX | Performed by: INTERNAL MEDICINE

## 2018-11-06 PROCEDURE — 43244 EGD VARICES LIGATION: CPT | Mod: NTX | Performed by: INTERNAL MEDICINE

## 2018-11-06 PROCEDURE — 82962 GLUCOSE BLOOD TEST: CPT | Mod: NTX | Performed by: INTERNAL MEDICINE

## 2018-11-06 PROCEDURE — 25000003 PHARM REV CODE 250: Mod: NTX | Performed by: HOSPITALIST

## 2018-11-06 PROCEDURE — 37000008 HC ANESTHESIA 1ST 15 MINUTES: Mod: NTX | Performed by: INTERNAL MEDICINE

## 2018-11-06 PROCEDURE — 99223 PR INITIAL HOSPITAL CARE,LEVL III: ICD-10-PCS | Mod: NTX,,, | Performed by: INTERNAL MEDICINE

## 2018-11-06 PROCEDURE — 99223 1ST HOSP IP/OBS HIGH 75: CPT | Mod: NTX,,, | Performed by: INTERNAL MEDICINE

## 2018-11-06 PROCEDURE — 83605 ASSAY OF LACTIC ACID: CPT | Mod: NTX

## 2018-11-06 PROCEDURE — 84145 PROCALCITONIN (PCT): CPT | Mod: NTX

## 2018-11-06 PROCEDURE — D9220A PRA ANESTHESIA: Mod: ANES,NTX,, | Performed by: ANESTHESIOLOGY

## 2018-11-06 PROCEDURE — 43244 EGD VARICES LIGATION: CPT | Mod: NTX,,, | Performed by: INTERNAL MEDICINE

## 2018-11-06 PROCEDURE — 99223 PR INITIAL HOSPITAL CARE,LEVL III: ICD-10-PCS | Mod: NTX,,, | Performed by: PHYSICIAN ASSISTANT

## 2018-11-06 PROCEDURE — 90670 PCV13 VACCINE IM: CPT | Mod: NTX | Performed by: PHYSICIAN ASSISTANT

## 2018-11-06 PROCEDURE — 27201022: Mod: NTX | Performed by: INTERNAL MEDICINE

## 2018-11-06 PROCEDURE — 90471 IMMUNIZATION ADMIN: CPT | Mod: NTX | Performed by: PHYSICIAN ASSISTANT

## 2018-11-06 PROCEDURE — 25000003 PHARM REV CODE 250: Mod: NTX | Performed by: NURSE ANESTHETIST, CERTIFIED REGISTERED

## 2018-11-06 PROCEDURE — 63600175 PHARM REV CODE 636 W HCPCS: Mod: JG,NTX | Performed by: PHYSICIAN ASSISTANT

## 2018-11-06 PROCEDURE — 99233 PR SUBSEQUENT HOSPITAL CARE,LEVL III: ICD-10-PCS | Mod: NTX,,, | Performed by: HOSPITALIST

## 2018-11-06 PROCEDURE — 83735 ASSAY OF MAGNESIUM: CPT | Mod: NTX

## 2018-11-06 PROCEDURE — 37000009 HC ANESTHESIA EA ADD 15 MINS: Mod: NTX | Performed by: INTERNAL MEDICINE

## 2018-11-06 PROCEDURE — 85610 PROTHROMBIN TIME: CPT | Mod: NTX

## 2018-11-06 PROCEDURE — 43244 PR EGD, FLEX, W/BAND LIGATION, ESOPH/GASTR VARICES: ICD-10-PCS | Mod: NTX,,, | Performed by: INTERNAL MEDICINE

## 2018-11-06 PROCEDURE — 63600175 PHARM REV CODE 636 W HCPCS: Mod: NTX | Performed by: NURSE ANESTHETIST, CERTIFIED REGISTERED

## 2018-11-06 PROCEDURE — D9220A PRA ANESTHESIA: Mod: CRNA,NTX,, | Performed by: NURSE ANESTHETIST, CERTIFIED REGISTERED

## 2018-11-06 PROCEDURE — P9047 ALBUMIN (HUMAN), 25%, 50ML: HCPCS | Mod: JG,NTX | Performed by: PHYSICIAN ASSISTANT

## 2018-11-06 PROCEDURE — 85025 COMPLETE CBC W/AUTO DIFF WBC: CPT | Mod: NTX

## 2018-11-06 PROCEDURE — 25000003 PHARM REV CODE 250: Mod: NTX | Performed by: PHYSICIAN ASSISTANT

## 2018-11-06 PROCEDURE — 25000003 PHARM REV CODE 250: Mod: NTX | Performed by: STUDENT IN AN ORGANIZED HEALTH CARE EDUCATION/TRAINING PROGRAM

## 2018-11-06 PROCEDURE — 80053 COMPREHEN METABOLIC PANEL: CPT | Mod: NTX

## 2018-11-06 PROCEDURE — 84100 ASSAY OF PHOSPHORUS: CPT | Mod: NTX

## 2018-11-06 PROCEDURE — 63600175 PHARM REV CODE 636 W HCPCS: Mod: NTX | Performed by: PHYSICIAN ASSISTANT

## 2018-11-06 RX ORDER — SODIUM CHLORIDE 0.9 % (FLUSH) 0.9 %
3 SYRINGE (ML) INJECTION
Status: DISCONTINUED | OUTPATIENT
Start: 2018-11-06 | End: 2018-11-11

## 2018-11-06 RX ORDER — SODIUM CHLORIDE 9 MG/ML
INJECTION, SOLUTION INTRAVENOUS CONTINUOUS PRN
Status: DISCONTINUED | OUTPATIENT
Start: 2018-11-06 | End: 2018-11-06

## 2018-11-06 RX ORDER — CEFTRIAXONE 1 G/1
1 INJECTION, POWDER, FOR SOLUTION INTRAMUSCULAR; INTRAVENOUS
Status: DISCONTINUED | OUTPATIENT
Start: 2018-11-06 | End: 2018-11-06

## 2018-11-06 RX ORDER — LIDOCAINE HCL/PF 100 MG/5ML
SYRINGE (ML) INTRAVENOUS
Status: DISCONTINUED | OUTPATIENT
Start: 2018-11-06 | End: 2018-11-06

## 2018-11-06 RX ORDER — TRIAMCINOLONE ACETONIDE 1 MG/G
CREAM TOPICAL 2 TIMES DAILY
Status: DISCONTINUED | OUTPATIENT
Start: 2018-11-06 | End: 2018-11-06 | Stop reason: SDUPTHER

## 2018-11-06 RX ORDER — PROPOFOL 10 MG/ML
VIAL (ML) INTRAVENOUS CONTINUOUS PRN
Status: DISCONTINUED | OUTPATIENT
Start: 2018-11-06 | End: 2018-11-06

## 2018-11-06 RX ORDER — ALBUMIN HUMAN 250 G/1000ML
25 SOLUTION INTRAVENOUS ONCE
Status: COMPLETED | OUTPATIENT
Start: 2018-11-06 | End: 2018-11-06

## 2018-11-06 RX ORDER — KETAMINE HCL IN 0.9 % NACL 50 MG/5 ML
SYRINGE (ML) INTRAVENOUS
Status: DISCONTINUED | OUTPATIENT
Start: 2018-11-06 | End: 2018-11-06

## 2018-11-06 RX ORDER — HYDROXYZINE HYDROCHLORIDE 25 MG/1
25 TABLET, FILM COATED ORAL 3 TIMES DAILY PRN
Status: DISCONTINUED | OUTPATIENT
Start: 2018-11-06 | End: 2018-11-11

## 2018-11-06 RX ORDER — TRIAMCINOLONE ACETONIDE 1 MG/G
CREAM TOPICAL 2 TIMES DAILY
Status: DISCONTINUED | OUTPATIENT
Start: 2018-11-06 | End: 2018-11-11

## 2018-11-06 RX ORDER — PROPOFOL 10 MG/ML
VIAL (ML) INTRAVENOUS
Status: DISCONTINUED | OUTPATIENT
Start: 2018-11-06 | End: 2018-11-06

## 2018-11-06 RX ADMIN — LACTULOSE 10 G: 20 SOLUTION ORAL at 08:11

## 2018-11-06 RX ADMIN — LIDOCAINE 1 PATCH: 50 PATCH CUTANEOUS at 05:11

## 2018-11-06 RX ADMIN — ALBUMIN HUMAN 25 G: 0.25 SOLUTION INTRAVENOUS at 04:11

## 2018-11-06 RX ADMIN — SODIUM BICARBONATE 650 MG TABLET 1300 MG: at 08:11

## 2018-11-06 RX ADMIN — SODIUM BICARBONATE 650 MG TABLET 1300 MG: at 04:11

## 2018-11-06 RX ADMIN — SODIUM CHLORIDE 500 ML: 0.9 INJECTION, SOLUTION INTRAVENOUS at 04:11

## 2018-11-06 RX ADMIN — SEVELAMER CARBONATE 800 MG: 800 TABLET, FILM COATED ORAL at 04:11

## 2018-11-06 RX ADMIN — MIDODRINE HYDROCHLORIDE 15 MG: 5 TABLET ORAL at 12:11

## 2018-11-06 RX ADMIN — LACTULOSE 10 G: 20 SOLUTION ORAL at 04:11

## 2018-11-06 RX ADMIN — LIDOCAINE 1 PATCH: 50 PATCH CUTANEOUS at 08:11

## 2018-11-06 RX ADMIN — PNEUMOCOCCAL 13-VALENT CONJUGATE VACCINE 0.5 ML: 2.2; 2.2; 2.2; 2.2; 2.2; 4.4; 2.2; 2.2; 2.2; 2.2; 2.2; 2.2; 2.2 INJECTION, SUSPENSION INTRAMUSCULAR at 05:11

## 2018-11-06 RX ADMIN — PROPOFOL 10 MG: 10 INJECTION, EMULSION INTRAVENOUS at 09:11

## 2018-11-06 RX ADMIN — LIDOCAINE HYDROCHLORIDE 50 MG: 20 INJECTION, SOLUTION INTRAVENOUS at 09:11

## 2018-11-06 RX ADMIN — FOLIC ACID 1 MG: 1 TABLET ORAL at 04:11

## 2018-11-06 RX ADMIN — Medication 30 MG: at 09:11

## 2018-11-06 RX ADMIN — MIDODRINE HYDROCHLORIDE 15 MG: 5 TABLET ORAL at 08:11

## 2018-11-06 RX ADMIN — Medication 100 MG: at 04:11

## 2018-11-06 RX ADMIN — PANTOPRAZOLE SODIUM 40 MG: 40 TABLET, DELAYED RELEASE ORAL at 04:11

## 2018-11-06 RX ADMIN — TRAMADOL HYDROCHLORIDE 50 MG: 50 TABLET, FILM COATED ORAL at 12:11

## 2018-11-06 RX ADMIN — RIFAXIMIN 550 MG: 550 TABLET ORAL at 04:11

## 2018-11-06 RX ADMIN — RAMELTEON 8 MG: 8 TABLET, FILM COATED ORAL at 08:11

## 2018-11-06 RX ADMIN — PROPOFOL 100 MCG/KG/MIN: 10 INJECTION, EMULSION INTRAVENOUS at 09:11

## 2018-11-06 RX ADMIN — THERA TABS 1 TABLET: TAB at 04:11

## 2018-11-06 RX ADMIN — PROPOFOL 50 MG: 10 INJECTION, EMULSION INTRAVENOUS at 09:11

## 2018-11-06 RX ADMIN — RIFAXIMIN 550 MG: 550 TABLET ORAL at 08:11

## 2018-11-06 RX ADMIN — PROPOFOL 20 MG: 10 INJECTION, EMULSION INTRAVENOUS at 09:11

## 2018-11-06 RX ADMIN — SODIUM CHLORIDE: 9 INJECTION, SOLUTION INTRAVENOUS at 09:11

## 2018-11-06 RX ADMIN — VANCOMYCIN HYDROCHLORIDE 1250 MG: 10 INJECTION, POWDER, LYOPHILIZED, FOR SOLUTION INTRAVENOUS at 12:11

## 2018-11-06 RX ADMIN — FLUCONAZOLE 100 MG: 100 TABLET ORAL at 04:11

## 2018-11-06 RX ADMIN — TRIAMCINOLONE ACETONIDE: 1 CREAM TOPICAL at 08:11

## 2018-11-06 RX ADMIN — MIDODRINE HYDROCHLORIDE 15 MG: 5 TABLET ORAL at 04:11

## 2018-11-06 RX ADMIN — CEFTRIAXONE SODIUM 1 G: 1 INJECTION, POWDER, FOR SOLUTION INTRAMUSCULAR; INTRAVENOUS at 01:11

## 2018-11-06 NOTE — TELEPHONE ENCOUNTER
Treatment Plan  11/06/2018  10:31 AM    Patient needs repeat EGD in 1 month for variceal surveillance.    Sera Wu M.D.  Gastroenterology Fellow, PGY-V  Pager: [phone]  [facility]

## 2018-11-06 NOTE — [employer]ATION PATIENT INSTRUCTIONS
Discharge Summary/Instructions after an Endoscopic Procedure  Patient Name: Femi Enciso  Patient MRN: [id number]  Patient YOB: 1965 Tuesday, November 06, 2018  Duarte Jules MD  RESTRICTIONS:  During your procedure today, you received medications for sedation.  These   medications may affect your judgment, balance and coordination.  Therefore,   for 24 hours, you have the following restrictions:   - DO NOT drive a car, operate machinery, make legal/financial decisions,   sign important papers or drink alcohol.    ACTIVITY:  Today: no heavy lifting, straining or running due to procedural   sedation/anesthesia.  The following day: return to full activity including work.  DIET:  Eat and drink normally unless instructed otherwise.     TREATMENT FOR COMMON SIDE EFFECTS:  - Mild abdominal pain, nausea, belching, bloating or excessive gas:  rest,   eat lightly and use a heating pad.  - Sore Throat: treat with throat lozenges and/or gargle with warm salt   water.  - Because air was used during the procedure, expelling large amounts of air   from your rectum or belching is normal.  - If a bowel prep was taken, you may not have a bowel movement for 1-3 days.    This is normal.  SYMPTOMS TO WATCH FOR AND REPORT TO YOUR PHYSICIAN:  1. Abdominal pain or bloating, other than gas cramps.  2. Chest pain.  3. Back pain.  4. Signs of infection such as: chills or fever occurring within 24 hours   after the procedure.  5. Rectal bleeding, which would show as bright red, maroon, or black stools.   (A tablespoon of blood from the rectum is not serious, especially if   hemorrhoids are present.)  6. Vomiting.  7. Weakness or dizziness.  GO DIRECTLY TO THE NEAREST EMERGENCY ROOM IF YOU HAVE ANY OF THE FOLLOWING:      Difficulty breathing              Chills and/or fever over 101 F   Persistent vomiting and/or vomiting blood   Severe abdominal pain   Severe chest pain   Black, tarry stools   Bleeding- more than one tablespoon   Any  other symptom or condition that you feel may need urgent attention  Your doctor recommends these additional instructions:  If any biopsies were taken, your doctors clinic will contact you in 1 to 2   weeks with any results.  - [facility] to [facility] for [facility] care.   - Mechanical soft diet today.   - Continue present medications.   - Repeat upper endoscopy in 1 month for surveillance.   - The findings and recommendations were discussed with the patient.   - The findings and recommendations were discussed with the patient's primary   physician.   For questions, problems or results please call your physician - Duarte Jules MD at Work:  [phone].  [facility], EMERGENCY ROOM PHONE NUMBER: [phone]  IF A COMPLICATION OR EMERGENCY SITUATION ARISES AND YOU ARE UNABLE TO REACH   YOUR PHYSICIAN - GO DIRECTLY TO THE EMERGENCY ROOM.  Duarte Jules MD  11/6/2018 10:27:13 AM  This report has been verified and signed electronically.  [employer]

## 2018-11-06 NOTE — TREATMENT PLAN
Treatment Plan  11/06/2018  10:29 AM      EGD completed with impression and recs below.    Impression:             - Normal examined duodenum.  - Portal hypertensive gastropathy.  - Large (> 5 mm) esophageal varices. Completely eradicated. Banded.  - No specimens collected.    Recommendation:         - [facility] to [facility] for [facility] care.  - Mechanical soft diet today.  - Continue present medications.  - Repeat upper endoscopy in 1 month for surveillance (order placed).  - The findings and recommendations were discussed with the patient.  - The findings and recommendations were discussed with the patient's primary physician.      Sera Wu M.D.  Gastroenterology Fellow, PGY-V  Pager: [phone]  [facility]

## 2018-11-06 NOTE — NURSING TRANSFER
Nursing Transfer Note      11/6/2018     Transfer from [facility] 30 to [id number]    Transfer via stretcher    Transfer with tele, O2    Chart send with patient:Yes    Notified: LEO Garcia

## 2018-11-06 NOTE — PLAN OF CARE
11/06/18 1225   Discharge Reassessment   Assessment Type Discharge Planning Reassessment     Undergoing inpatient liver evaluation with committee presentation tomorrow.

## 2018-11-06 NOTE — CONSULTS
"[facility]  Dermatology  Consult Note    Patient Name: Femi Enciso  MRN: [id number]  Admission Date: 10/27/2018  Hospital Length of Stay: 10 days  Attending Physician: Hai Wylie MD  Primary Care Provider: Primary Doctor No     Consults  Subjective:     Principal Problem:Pre-transplant evaluation for liver transplant    HPI:  Femi Enciso is a 53 y.o. male w/  HTN, HLD, T2DM, and alcoholic cirrhosis currently admitted for acute alcoholic cirrhosis decompensation and liver transplant evaluation.  Hospital course complicated by hepatorenal on dialysis. Urine culture + for E cloacae but UA inconsistent; patient was treated with 7d of cefepime (last received on 11/02).  He also had a R sided pleural effusion that was drained - no cultures done.  Previous admission for possible PNA. Patient denies any recent fever, chills, or infective illnesses. Dermatology was consulted for acute onset of an erythematous extremity rash.     Patient reports developing a rash about 1 week ago. Per wife, rash is intermittent in nature. Patient reports developing symptoms of itch (easily attributed to his hx of liver failure) prior to the onset of the rash. Rash described as "splotchy redness" that began on the flexural aspects of the extremitites and spread to the lower extremities and upper arms. Patient denies significant involvement of the abdomen. Denies oral ulcers, genital involvement, blister formation, facial swelling, tenderness of the skin and/or desquamation. Of note, patient has a hx of penicillin with n/v and rash listed as the historic reaction. However, the patient is also listed as tolerating cefepime 10/27/2018. Per patient, he has been told his whole life that he is allergic to penicillin but he denies knowing he's had a rash to the medication.     On admission, WBC of 15.8. Down trended to 9.7 after 5 days of cefepime. Cefepime was stopped on 11/02 with increasing WBC thereafter, today at 15.34. " Patient started on ceftriaxone and vancomycin today. Blood cultures drawn on 11/5 are negative.     Past Medical History:   Diagnosis Date    Liver cirrhosis, alcoholic 09/30/2018       History reviewed. No pertinent surgical history.  Family History     None        Tobacco Use    Smoking status: Never Smoker   Substance and Sexual Activity    Alcohol use: Not on file    Drug use: Not on file    Sexual activity: Not on file       Review of patient's allergies indicates:   Allergen Reactions    Penicillins Nausea And Vomiting and Rash     Tolerated cefepime 10/27/18       Medications:  Continuous Infusions:  Scheduled Meds:   cefTRIAXone (ROCEPHIN) IVPB  1 g Intravenous Q24H    fluconazole  100 mg Oral Daily    folic acid  1 mg Oral Daily    lactulose  10 g Oral BID    lidocaine  1 patch Transdermal Q24H    lidocaine  1 patch Transdermal Q24H    midodrine  15 mg Oral TID    multivitamin  1 tablet Oral Daily    pantoprazole  40 mg Oral Daily    rifAXImin  550 mg Oral BID    sevelamer carbonate  800 mg Oral TID WM    sodium bicarbonate  1,300 mg Oral TID    thiamine  100 mg Oral Daily    triamcinolone acetonide 0.1%   Topical (Top) BID    vancomycin (VANCOCIN) IVPB  15 mg/kg (Dosing Weight) Intravenous Once     PRN Meds:acetaminophen, albumin human 25%, albuterol-ipratropium, dextrose 50%, dextrose 50%, glucagon (human recombinant), glucose, glucose, insulin aspart U-100, ondansetron, promethazine (PHENERGAN) IVPB, promethazine (PHENERGAN) IVPB, ramelteon, sodium chloride 0.9%, sodium chloride 0.9%, traMADol    Review of Systems   Constitutional: Positive for chills and fatigue. Negative for fever.   HENT: Negative for mouth sores.    Eyes: Negative for itching.   Gastrointestinal: Positive for nausea.   Genitourinary: Negative for genital sores and genital itching.   Skin: Positive for itching and rash.     Objective:     Vital Signs (Most Recent):  Temp: 96.2 °F (35.7 °C) (11/06/18 1130)  Pulse:  80 (11/06/18 1219)  Resp: 16 (11/06/18 1130)  BP: (!) 99/55 (11/06/18 1219)  SpO2: (!) 94 % (11/06/18 1130) Vital Signs (24h Range):  Temp:  [37.4 °F (3 °C)-99.1 °F (37.3 °C)] 96.2 °F (35.7 °C)  Pulse:  [75-89] 80  Resp:  [15-18] 16  SpO2:  [93 %-100 %] 94 %  BP: ()/(48-68) 99/55     Weight: 85.4 kg (188 lb 4.4 oz)  Body mass index is 27.01 kg/m².    Physical Exam   Constitutional: He appears cachectic.   Neurological: He is alert.   Psychiatric: He has a normal mood and affect.   Skin:   Areas Examined (abnormalities noted in diagram):   Head / Face Inspection Performed  Neck Inspection Performed  Chest / Axilla Inspection Performed  Abdomen Inspection Performed  RUE Inspected  LUE Inspection Performed  RLE Inspected  LLE Inspection Performed                                  Significant Labs:   CBC:   Recent Labs   Lab 11/05/18  0350 11/06/18  0525   WBC 15.97* 15.34*   HGB 10.2* 10.4*   HCT 30.0* 31.3*   PLT 68* 61*       Significant Imaging: None    Assessment/Plan:     Acute maculopapular rash    Femi Enciso is a 53 y.o. male with alcoholic cirrhosis admitted for liver transplantation evaluation. Hospital course complicated by hepatorenal syndrome requiring dialysis and sepsis believed secondary to UTI. Current infectious work-up today otherwise unremarkable. Now with a ~1 week hx of ill-defined, evanescent and erythematous extremity rash. Dermatology consulted for evaluation.     Evanescent and erythematous macular rash: favor drug induced rash due to cefepime. Patient with possible hx of penicillin allergy with recent 7 day course of cefepime (cross reacts w/ penicillins) with development of intermittent, itchy red rash. Drug rashes tend to occur 5-7 days after initiation of antibiotics. However, given patient's continued leukocytosis (possible incompletely treated UTI?) we cannot rule out a more concerning infectious process such as TSS from strep or staph. Lack of scarlatiniform rash, high grade fever,  palmar involvement or desquamation makes this less likely. Little concern for bullous drug eruption (TEN/SJS unlikely given lack of bulla), DRESS (no eosinophilia, no angioedema).   - Patient switched to ceftriaxone and vancomycin today; watch for continued rash with use of ceftriaxone.   - Would recommend using previous urine culture results to start patient on antibiotics that do not cross reaction with penicillin. Possible that UTI was incompletely treated given uptrending WBC after stopping cefepime.   - Blood cultures drawn on 11/05 w/ NGTD.   - Consider repeat urine culture and u/a although unlikely to be + given recent abx exposure.   - Start TAC 0.1% cream; apply to the affected sites BID.   - Start hydroxyzine 25 mg po q8h prn; watch for increasing somnolence.   - Biopsy unlikely to reveal etiology.   - Rash should not preclude patient from transplantation but in light of continued leukocytosis, can not rule out rash as manifestation of underlying infection. If this were the case, current drug regimen would adequately treat.   - If rash worsens (increased spread, blister development, oral involvement, etc.), please contact us.              Thank you for your consult. I will follow-up with patient. Please contact us if you have any additional questions.    Tessa Whelan MD  Dermatology  [facility]

## 2018-11-06 NOTE — PLAN OF CARE
Problem: Patient Care Overview  Goal: Plan of Care Review  Pt remained AAO x4.  BP trended down through shift.  MD contacted and 500 cc bolus of NS and 25g albumin administered.  Pt up out bed to chair.  Pt remained NPO throughout shift.  Pt c/o nausea and Zofran administered with relief. All medications given as ordered. Pt had 2 very small BM. Pt remained afebrile; no falls WCTM.

## 2018-11-06 NOTE — CONSULTS
[facility]  Kidney Transplant  Consult Note    Inpatient consult to Kidney/Pancreas Transplant Medicine  Consult performed by: Keith Fabian MD  Consult ordered by: Hai Wylie MD  Reason for consult: Combined liver and kidney transplant             Subjective:     History of Present Illness:   Femi Enciso is a 53 year old man with T2DM , Liver cirrhosis most likely due to EtOH abundant history of drinking (diagnosed in Sept 2018 in [place]).  He reports jaundice, generalized weakness, nausea, diarrhea, and decreased appetite since Sept 2018.  He is from [place], [place], and Dr. Sharma (patients physician) recommended bringing him to hospital for evaluation. Before admission his serum creatinine baseline was 0.9 to 1.2 since October 1st to October 15th of 2018 (GFR was 64-97 mL/min), which he started with increased creatinine on October 22 of 2018 at 2. At admission on October 27 of 2018 presented with a serum creatinine of 6 and GFR of 9.8 mL/min. In which had to be started next day (10/28/2018) on SLED for clearance and volume removal. Currently has not showed any sygn of renal improvement and continue on HD treatment. [facility] was consulted for evaluation for combined liver and kidney transplant.       Mr. Enciso is a 53 y.o. year old male who is consulted for evaluation of combined liver and kidney transplant.       Immunosuppressants (From admission, onward)    None          Interval History:  Patient evaluated at bedside in post op area, after he had EGD done. No significant event overnight. Last HD treatment was done yesterday. Denies any SOB, fever, chills, nausea or vomiting.     Past Medical and Surgical History: Mr. Enciso has a past medical history of Liver cirrhosis, alcoholic (09/30/2018).  He has no past surgical history on file.    Past Social and Family History: Mr. Enciso reports that  has never smoked. He does not have any smokeless tobacco history on file.His family history is not on  "file.    Intake/Output - Last 3 Shifts       11/04 0700 - 11/05 0659 11/05 0700 - 11/06 0659 11/06 0700 - 11/07 0659    P.O. 1910 1160     I.V. (mL/kg)   100 (1.2)    Other  550     IV Piggyback       Total Intake(mL/kg) 1910 (23.1) 1710 (20) 100 (1.2)    Urine (mL/kg/hr) 0 (0)      Emesis/NG output 0      Other  1531     Stool 0 0     Blood 0      Total Output 0 1531     Net +1910 +179 +100           Urine Occurrence  0 x     Stool Occurrence 6 x 8 x 3 x           Review of Systems   Constitutional: Positive for activity change, appetite change and fatigue. Negative for chills and fever.   Respiratory: Positive for shortness of breath and wheezing. Negative for cough.    Cardiovascular: Negative for chest pain and leg swelling.   Gastrointestinal: Positive for abdominal distention and nausea. Negative for constipation, diarrhea and rectal pain.   Genitourinary: Negative for difficulty urinating and dysuria.   Musculoskeletal: Negative.    Skin: Negative for color change and rash.   Allergic/Immunologic: Negative for immunocompromised state.   Neurological: Negative for dizziness and light-headedness.   Hematological: Bruises/bleeds easily.   Psychiatric/Behavioral: Positive for confusion.   All other systems reviewed and are negative.    Objective:     Vital Signs (Most Recent):  Temp: 96.2 °F (35.7 °C) (11/06/18 1130)  Pulse: 80 (11/06/18 1219)  Resp: 16 (11/06/18 1130)  BP: (!) 99/55 (11/06/18 1219)  SpO2: (!) 94 % (11/06/18 1130) Vital Signs (24h Range):  Temp:  [37.4 °F (3 °C)-99.1 °F (37.3 °C)] 96.2 °F (35.7 °C)  Pulse:  [75-89] 80  Resp:  [15-18] 16  SpO2:  [93 %-100 %] 94 %  BP: ()/(48-68) 99/55     Weight: 85.4 kg (188 lb 4.4 oz)  Height: 5' 10" (177.8 cm)  Body mass index is 27.01 kg/m².    Physical Exam   Constitutional: He appears well-developed. He appears cachectic. He is cooperative. No distress.   HENT:   Head: Normocephalic and atraumatic.   Eyes: Conjunctivae are normal. Pupils are equal, " round, and reactive to light.   Neck: Trachea normal and normal range of motion. Neck supple. No JVD present.   Cardiovascular: Normal rate, regular rhythm, S1 normal, S2 normal and normal pulses. Exam reveals no gallop and no friction rub.   No murmur heard.  Pulmonary/Chest: Effort normal. He has rales.   Abdominal: Soft. Bowel sounds are normal. He exhibits distension and ascites. There is no tenderness.   Musculoskeletal: Normal range of motion.   Neurological: He is alert.   Skin: Skin is warm and dry. Capillary refill takes less than 2 seconds.   Psychiatric: He has a normal mood and affect. His behavior is normal.   Vitals reviewed.      Significant Labs:  CBC:   Recent Labs   Lab 11/04/18 0424 11/05/18 0350 11/06/18  0525   WBC 11.75 15.97* 15.34*   RBC 3.16* 3.08* 3.09*   HGB 10.4* 10.2* 10.4*   HCT 30.6* 30.0* 31.3*   PLT 52* 68* 61*   MCV 97 97 101*   MCH 32.9* 33.1* 33.7*   MCHC 34.0 34.0 33.2     BMP:   Recent Labs   Lab 11/04/18 0424 11/05/18  0350 11/06/18  0525   GLU 88 110 83   * 133* 139   K 4.4 4.7 5.2*   CL 99 99 100   CO2 23 21* 23   BUN 64* 77* 61*   CREATININE 7.0* 8.1* 7.2*   CALCIUM 8.5* 8.3* 8.5*     CMP:   Recent Labs   Lab 11/04/18 0424 11/05/18  0350 11/06/18  0525   GLU 88 110 83   CALCIUM 8.5* 8.3* 8.5*   ALBUMIN 2.4* 2.2* 2.5*   PROT 5.1* 4.9* 5.2*   * 133* 139   K 4.4 4.7 5.2*   CO2 23 21* 23   CL 99 99 100   BUN 64* 77* 61*   CREATININE 7.0* 8.1* 7.2*   ALKPHOS 211* 213* 206*   ALT 33 32 29   AST 72* 69* 64*     ABGs: No results for input(s): PH, PCO2, HCO3, POCSATURATED, BE in the last 168 hours.      Assessment/Plan:     Encounter for pre-transplant evaluation for kidney transplant    - According to UNOS candidate criteria states that patient with sustained acute kidney injury has to be on dialysis for at least 6 consecutive weeks   - In which currently doesn't meet criteria for combined liver and kidney transplant. If patient stays on HD up to December 6 of 2018  please re-consult.      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   - Currently dialysis dependent since 10/28/2018         Keith Gomes  Nephrology  Fellow  [facility]    Pager [phone]

## 2018-11-06 NOTE — ASSESSMENT & PLAN NOTE
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   - Currently dialysis dependent since 10/28/2018

## 2018-11-06 NOTE — PLAN OF CARE
Patient tolerated procedure well.  VSS. Called report to LEO Garcia on [facility].  Confirmed that patient will not be getting permacath placed today.  Tolerating po (small amount of ice water).

## 2018-11-06 NOTE — CONSULTS
Pre Transplant Infectious Diseases Consult  Liver Transplant Recipient Evaluation    Requesting Physician: Shailesh VILLELA    Reason for Visit:    Chief Complaint   Patient presents with    Liver Failure     weak , nausea, not feeling good,     History of Present Illness  Femi Enciso is a 53 y.o. year old White male with advanced Liver disease currently being evaluated for Liver transplant. Urine culture showed E cloacae but UA not significantly positive and was treated with 7d of cefepime.  He also had a R sided pleural effusion that was drained - no cultures done. Patient denies any recent fever, chills, or infective illnesses.      1) Do you have a history of:   YES NO   Diabetes      [x] []     Diabetic Foot Infection/Bone Infection  []        [x]     Surgical Removal of Spleen   []        [x]       2) Have you had recurrent infections involving:         Organs:   YES NO  Sinus infections  []         [x]   Sore Throat   []         [x]                 Prostate Infections  []         [x]              Bladder Infections  []         [x]                     Kidney Infections  []         [x]                               Intestinal Infections  []         [x]      Skin Infections   []         [x]       Reproductive Infections []         [x]        Periodontal Disease  []         [x]        3)Have you ever had: YES     NO     Chicken Pox   [x]         []    Shingles   []         [x]    Orolabial Herpes  []         [x]    Genital Herpes  []         [x]    Cytomegalovirus  []         [x]    Latanya-Barr Virus  []         [x]    Genital Warts   []         [x]    Hepatitis A   []         [x]    Hepatitis B   []         [x]     Hepatitis C   []         [x]    Syphilis   []         [x]    Gonorrhea   []         [x]   Pelvic Inflammatory   Disease   []         [x]    Chlamydia Infection  []         [x]    Intestinal parasites/worms []         [x]    Fungal Infections  []         [x]    Blood Infections  []         [x]      Comment:      4) Have you ever been exposed   YES NO  To someone with tuberculosis?  [x]   []   If yes, what treatment did you receive:     5) What states have you lived in?  [place] (visit 1-2 weeks),  [place]residence)    6) What countries have you visited for more than 2 weeks? [facility]                        YES NO  7) Did you have any associated travel infections? []  [x]       8) Are you planning to travel outside the    []  []   [place] after your transplant?    9) Household                  YES NO  Do you have pets living in your house/pet contact? [x]         []   If yes, describe: 2 dogs and Indoor cat - does not change liter box but has in past     Do you spend time or live on a farm or    []         [x]   have livestock or other farm animals?  If yes, which ones:    Do you have a fish tank?          []   [x]       Do you have a litter box?     [x]         []     Do you fish or hunt?      []         [x]     Do you clean or skin fish or animals?   []         [x]     Do you consume raw or undercooked   []         [x]   meat, fish, or shellfish?      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                                             YES NO  12)Do you garden or otherwise   work in the soil?      []         [x]     13)Do you hike, camp, or spend   time in wooded areas?     []         []        14) The patient's immunization history was reviewed.    Have you ever received:  YES NO UNKNOWN DATES   Routine Childhood vaccines  [x]         []  []      Influenza vaccine   [x]  []  [] 10/2018    Pneumovax vaccine   []  [x]  []     Tetanus-diptheria vaccine  [x]         []  [] 11/2014    Hepatitis A vaccine series       []  []  [x]    Hepatitis B vaccine series         []  []  [x]    Meningitis vaccine   []         []  [x]    Varicella vaccine   []         []  []        Based on the patients immunization history and  serologies, immunizations were ordered:         Ordered  Not Ordered  Influenza vaccine     []    [x]   Hepatitis A vaccine series at 0 and 6 months [x]    []   Hepatitis B at 0, 1, and 6 months   []    [x]   Hepatitis B High Dose 0, 1, and 6 months  [x]    []   Prevnar vaccine     [x]    []   Pneumovax vaccine     []    [x]    TDap vaccine      []    [x]    Varicella vaccine     []    [x]   Menactra (meningitis) vaccine   []    [x]            The patient was encouraged to contact us about any problems that may develop after immunization and possible side effects were reviewed.      Previous Transplant: no    Etiology of Liver Disease: etoh    Allergies: Penicillins  Immunization History   Administered Date(s) Administered    PPD Test 11/01/2018     Past Medical History:   Diagnosis Date    Liver cirrhosis, alcoholic 09/30/2018     History reviewed. No pertinent surgical history.   Social History     Socioeconomic History    Marital status:      Spouse name: Not on file    Number of children: Not on file    Years of education: Not on file    Highest education level: Not on file   Social Needs    Financial resource strain: Not on file    Food insecurity - worry: Not on file    Food insecurity - inability: Not on file    Transportation needs - medical: Not on file    Transportation needs - non-medical: Not on file   Occupational History    Not on file   Tobacco Use    Smoking status: Never Smoker   Substance and Sexual Activity    Alcohol use: Not on file    Drug use: Not on file    Sexual activity: Not on file   Other Topics Concern    Not on file   Social History Narrative    Not on file       Review of Systems   Constitution: Positive for decreased appetite, weakness, malaise/fatigue and weight gain (fluid). Negative for chills, fever, night sweats and weight loss.   HENT: Positive for tinnitus. Negative for congestion, ear pain, hearing loss, hoarse voice and sore throat.    Eyes: Negative  for blurred vision, redness and visual disturbance.   Cardiovascular: Negative for chest pain, leg swelling and palpitations.   Respiratory: Positive for shortness of breath. Negative for cough, hemoptysis, sputum production and wheezing.    Endocrine: Negative for cold intolerance and heat intolerance.   Hematologic/Lymphatic: Negative for adenopathy. Does not bruise/bleed easily.   Skin: Negative for dry skin, itching, rash and suspicious lesions.   Musculoskeletal: Positive for back pain. Negative for joint pain, myalgias and neck pain.   Gastrointestinal: Positive for nausea. Negative for abdominal pain, constipation, diarrhea, heartburn and vomiting.   Genitourinary: Negative for dysuria, flank pain, frequency, hematuria, hesitancy and urgency.   Neurological: Negative for dizziness, headaches, numbness and paresthesias.   Psychiatric/Behavioral: Negative for depression and memory loss. The patient does not have insomnia and is not nervous/anxious.    Allergic/Immunologic: Negative for environmental allergies, HIV exposure, hives and persistent infections.     Physical Exam   Constitutional: He is oriented to person, place, and time. He appears well-developed and well-nourished.       HENT:   Head: Normocephalic and atraumatic.   Mouth/Throat: Uvula is midline, oropharynx is clear and moist and mucous membranes are normal. He does not have dentures. No oral lesions. Normal dentition. Dental caries (filled) present. No dental abscesses or lacerations.   Eyes: Conjunctivae and lids are normal. Pupils are equal, round, and reactive to light. Scleral icterus is present.   Neck: Neck supple.   Cardiovascular: Normal rate and regular rhythm. Exam reveals no gallop and no friction rub.   No murmur heard.  Pulmonary/Chest: Effort normal and breath sounds normal. No stridor. No respiratory distress. He has no decreased breath sounds. He has no wheezes. He has no rhonchi. He has no rales.   Abdominal: Soft. Normal  appearance, normal aorta and bowel sounds are normal. He exhibits no distension and no mass. There is no hepatosplenomegaly. There is no tenderness. There is no rebound and no guarding.   Musculoskeletal: He exhibits no edema.   Lymphadenopathy:        Head (right side): No submental, no submandibular, no tonsillar, no preauricular, no posterior auricular and no occipital adenopathy present.        Head (left side): No submental, no submandibular, no tonsillar, no preauricular, no posterior auricular and no occipital adenopathy present.     He has no cervical adenopathy.     He has no axillary adenopathy.        Right: No inguinal, no supraclavicular and no epitrochlear adenopathy present.        Left: No inguinal, no supraclavicular and no epitrochlear adenopathy present.   Neurological: He is alert and oriented to person, place, and time. No cranial nerve deficit.   Skin: Skin is warm, dry and intact. No lesion and no rash noted. He is not diaphoretic. No erythema. No pallor.   Very Jaundiced     Psychiatric: He has a normal mood and affect. His behavior is normal.     Diagnostics:     Microbiology:  Procedure Component Value Units Date/Time   Blood culture [[id number]] Collected: 11/05/18 1852   Order Status: Completed Specimen: Blood Updated: 11/06/18 0145    Blood Culture, Routine No Growth to date   Blood culture [[id number]]    Order Status: Canceled Specimen: Blood    Culture, Respiratory with Gram Stain [[id number]] Collected: 11/02/18 2147   Order Status: Completed Specimen: Respiratory from Sputum, Expectorated Updated: 11/05/18 0748    Respiratory Culture Normal respiratory ty    Gram Stain (Respiratory) <10 epithelial cells per low power field.    Gram Stain (Respiratory) Rare WBC's    Gram Stain (Respiratory) Few Gram positive cocci   Culture, Fluid (Aerobic) [[id number]]    Order Status: No result Specimen: Pleural Fluid    Culture, Body Fluid - Bactec [[id number]]    Order Status: No result Specimen:  Pleural Fluid    Blood Culture #2 **CANNOT BE ORDERED STAT** [[id number]] Collected: 10/27/18 1715   Order Status: Completed Specimen: Blood from Peripheral, Hand, Right Updated: 11/01/18 2012    Blood Culture, Routine No growth after 5 days.   Blood Culture #1 **CANNOT BE ORDERED STAT** [[id number]] Collected: 10/27/18 1702   Order Status: Completed Specimen: Blood from Peripheral, Hand, Left Updated: 11/01/18 2012    Blood Culture, Routine No growth after 5 days.   Urine culture [[id number]]  Collected: 10/27/18 1642   Order Status: Completed Specimen: Urine Updated: 10/29/18 1906    Urine Culture, Routine --    ENTEROBACTER CLOACAE   > 100,000 cfu/ml    Narrative:     Preferred Collection Type->Urine, Clean Catch  ADDING ON TOXICOLOGY SCREEN URINE PER PENELOPE BLACKMAN MD 19:04    10/27/2018    Susceptibility      Enterobacter cloacae     CULTURE, URINE     Cefepime <=8  Sensitive     Ceftriaxone <=8  Sensitive     Ciprofloxacin <=1  Sensitive     Ertapenem <=2  Sensitive     Gentamicin <=4  Sensitive     Meropenem <=4  Sensitive     Nitrofurantoin >64  Resistant     Piperacillin/Tazo <=16  Sensitive     Tetracycline >8  Resistant     Tobramycin <=4  Sensitive     Trimeth/Sulfa <=2/38  Sensitive                      RPR   Date Value Ref Range Status   11/02/2018 Non-reactive Non-reactive Final     No results found for: CMVANTIBODIE  No results found for: HIV1X2  No results found for: HTLVIIIANTIB  Hepatitis B Surface Ag   Date Value Ref Range Status   11/02/2018 Negative  Final     Hep B Core Total Ab   Date Value Ref Range Status   11/02/2018 Negative  Final     Hepatitis C Ab   Date Value Ref Range Status   11/02/2018 Negative  Final     No results found for: TOXOIGG  No components found for: TOXOIGGINTER  No results found for: LKW8VZP  No results found for: PDU0LUH  Varicella IgG   Date Value Ref Range Status   11/02/2018 4.18 (H) 0.00 - 0.90 ISR Final     Varicella Interpretation   Date Value Ref Range Status    11/02/2018 Positive (A) Negative Final     Comment:     <or=0.90     Negative        No detectable IgG antibody to Varicella zoster  by the [facility] test. Such individuals are presumed to be   uninfected with Varicella zoster and to be susceptible to   primary infection.  0.91-1.09    Equivocal  >or=1.10     Positive        Indicates presence of detectable IgG antibody to Varicella   zoster by the [facility] test. Indicative of previous or current   infection.       No results found for: STRONGANTIGG  No results found for: EPSTEINBARRV  Hep B S Ab   Date Value Ref Range Status   11/02/2018 Negative  Final     No results found for: QUANTIFERON  Hep A IgM   Date Value Ref Range Status   10/27/2018 Negative  Final          Ref. Range 11/2/2018 17:57   Hepatitis A Antibody IgG Unknown Negative      Ref. Range 10/27/2018 14:57 11/2/2018 17:57   Hep A IgM Unknown Negative    Hep B C IgM Unknown Negative    Hep B Core Total Ab Unknown  Negative   Hep B S Ab Unknown  Negative   Hepatitis B Surface Ag Unknown Negative Negative   Haptoglobin Latest Ref Range: 30 - 250 mg/dL 61    Hepatitis C Ab Unknown Negative Negative   HIV 1/2 Ag/Ab Latest Ref Range: Negative   Negative   RPR Latest Ref Range: Non-reactive   Non-reactive   Varicella IgG Latest Ref Range: 0.00 - 0.90 ISR  4.18 (H)   Varicella Interpretation Latest Ref Range: Negative   Positive (A)     POCT TB Skin Test Read   Order: [id number]   Status:  Final result   Visible to patient:  Yes (Patient Portal) Next appt:  None    Ref Range & Units 2d ago   TB Induration 48 - 72 hr read mm 0          Specimen Collected: 11/04/18 09:19 Last Resulted: 11/04/18 09:19 Lab Flowsheet Order Details View Encounter Lab and Collection Details           Imaging:  CT Previous [[id number]] Resulted: 11/06/18 0908   Order Status: Completed Updated: 11/06/18 0908   CT Previous [[id number]] Resulted: 11/06/18 0906   Order Status: Completed Updated: 11/06/18 0906   CT Previous [[id number]] Resulted:  11/06/18 0906   Order Status: Completed Updated: 11/06/18 0906   US Previous [[id number]] Resulted: 11/06/18 0903   Order Status: Completed Updated: 11/06/18 0903   US Previous [[id number]] Resulted: 11/06/18 0903   Order Status: Completed Updated: 11/06/18 0903   US Previous [[id number]] Resulted: 11/06/18 0903   Order Status: Completed Updated: 11/06/18 0903   Xray Previous [[id number]] Resulted: 11/06/18 0902   Order Status: Completed Updated: 11/06/18 0902   X-Ray Chest 1 View [[id number]] Resulted: 11/01/18 0656   Order Status: Completed Updated: 11/01/18 0658   Narrative:     EXAMINATION:  XR CHEST 1 VIEW    CLINICAL HISTORY:  pleural effusion;    COMPARISON:  Comparison is made to 10/31/2018 at 18:07    FINDINGS:  Previously present right pleural catheter is no longer seen.  Allowing for some differences in patient positioning, there has been no significant detrimental interval change in the appearance of the chest since 10/31/2018 at 18:07, with the volume of pleural fluid on the right side appearing stable.  No pneumothorax.   Impression:       As above      Electronically signed by: Petar Hinojosa MD  Date: 11/01/2018  Time: 06:56   X-Ray Chest 1 View [[id number]] Resulted: 10/31/18 1839   Order Status: Completed Updated: 10/31/18 1842   Narrative:     EXAMINATION:  XR CHEST 1 VIEW    CLINICAL HISTORY:  chest tube;    TECHNIQUE:  Single frontal view of the chest was performed.    COMPARISON:  10/30/2018    FINDINGS:  Right pleural drainage catheter again noted, positioning appears similar although limited evaluation given patient positioning.  Right central venous catheter tip projects over the distal SVC/right atrial junction.  The cardiomediastinal silhouette is stable in configuration.  There is a right pleural effusion, decreased since the previous exam..  The trachea is midline.  The lungs are symmetrically expanded bilaterally with patchy increased interstitial and parenchymal attenuation bilaterally, right  greater than left, improved on the right, similar on the left..  There is no pneumothorax.  The osseous structures are unchanged..   Impression:       As above      Electronically signed by: Franco Lao MD  Date: 10/31/2018  Time: 18:39   X-Ray Chest 1 View [[id number]] Resulted: 10/30/18 1954   Order Status: Completed Updated: 10/30/18 1956   Narrative:     EXAMINATION:  XR CHEST 1 VIEW    CLINICAL HISTORY:  s/p chest tube placement;    TECHNIQUE:  Single frontal view of the chest was performed.    COMPARISON:  10/30/2018 at 11:46 hours.    FINDINGS:  There is a right-sided central access catheter with its tip in the cavoatrial junction.  Monitoring EKG leads are present.  There has been interval placement of a right-sided chest tube with its tip terminating in the right lung base.    The trachea is unremarkable.  There is stable enlargement of the cardiomediastinal silhouette.  There is decrease in the right-sided pleural effusion.  There is no evidence of a pneumothorax.  There is no evidence of pneumomediastinum.  There is improved aeration of the right hemithorax.  The remainder of the lung fields are unremarkable.  There are degenerative changes in the osseous structures.   Impression:       Interval placement of right-sided chest tube with the tip terminating in the right lung base.  Decrease in right-sided pleural effusion with improved aeration of the right hemithorax.      Electronically signed by: Angel Shabazz MD  Date: 10/30/2018  Time: 19:54   X-Ray Chest 1 View [[id number]] Resulted: 10/30/18 1230   Order Status: Completed Updated: 10/30/18 1232   Narrative:     EXAMINATION:  XR CHEST 1 VIEW    CLINICAL HISTORY:  hydrothorax;    TECHNIQUE:  Single frontal view of the chest was performed.    COMPARISON:  No 10/28/2018 ne    FINDINGS:  Central venous catheter in the SVC.  Complete opacification of the right hemithorax identified.  The left lung is essentially clear   Impression:       See  above      Electronically signed by: Petar Garcia MD  Date: 10/30/2018  Time: 12:30     CT Chest Without Contrast   Order: [id number]   Status:  Final result   Visible to patient:  No (Not Released) Next appt:  None   Details     Reading Physician Reading Date Result Priority   John Jurado MD 10/27/2018    Dmitri Cruz MD 10/27/2018       Narrative     EXAMINATION:  CT CHEST WITHOUT CONTRAST    CLINICAL HISTORY:  moderate sized pleural effusion seen on CXR, further characterization, patient has low grade fevers and elevated WBC;    TECHNIQUE:  Low dose axial images, sagittal and coronal reformations were obtained from the thoracic inlet to the lung bases. Contrast was not administered.    COMPARISON:  Chest radiograph 10/27/2018    FINDINGS:  Base of neck: Soft tissue and vascular structures are unremarkable.    Thoracic soft tissues: Normal.    Aorta: Left-sided three-vessel aortic arch with mild calcific atherosclerosis.  Thoracic aorta maintains appropriate caliber, contour, and course.    Pulmonary vasculature: Pulmonary arteries distribute appropriately.  There are 4 pulmonary veins.    Heart: No cardiac enlargement or pericardial effusion.  There is mild coronary atherosclerosis as well as calcification of the aortic valve.    Asiya/Mediastinum: No lymphadenopathy.    Airways: Trachea is midline and the proximal airways are patent.    Lungs/Pleura: There is a large right pleural effusion with compressive atelectasis of the adjacent pulmonary parenchyma and right upper lobe platelike scarring versus atelectasis.  Aerated pulmonary parenchyma is clear with only mild left basilar atelectasis.  No pneumothorax.    Esophagus: Normal.    Upper Abdomen: Small hyperdense focus within the stomach lumen likely represents and suggested pill.  Spleen is enlarged.  There is a small volume of abdominal fluid overlying the liver.    Bones: Mild degenerative change without acute fracture or bone destructive process.       Impression       Large right pleural effusion.    Mild coronary atherosclerosis.    Aortic valve calcification.    Splenomegaly.    Small volume upper abdominal ascites.    Electronically signed by resident: Dmitri Cruz  Date: 10/27/2018  Time: 20:44    Electronically signed by: John Jurado MD  Date: 10/27/2018  Time: 21:42           DOBUTAMINE STRESS TEST W/ COLOR FLOW   Pharmacological stress test w/ Color Se   Order: [id number]   Status:  Final result   Visible to patient:  Yes (Patient Portal) Next appt:  None Dx:  Encounter for pre-transplant evaluati...   Details        Narrative     Date of Procedure: 11/02/2018    PRE-TEST DATA   EKG: EKG demonstrates adequate. Resting electrocardiogram reveals sinus rhythm at a rate of 88 bpm.     TEST DESCRIPTION   The patient received a graduated infusion of Dobutamine beginning at 10.00 mcg/Kg/min to a peak dose of 50 mcg/Kg/min, achieving a peak heart rate of 139 bpm, which is 83% of the age predicted maximum heart rate. The patient also received a total of 1 mg   of Atropine. Infusion was terminated secondary to completed protocol. patient hypotensive during stress likely secondary to dobutamine and complete obliteration of LV cavity.    EKG Conclusions:    1. The EKG portion of this study is negative for ischemia at a peak heart rate of 139 bpm (83% of predicted).   2. Blood pressure response was abnormal, with an inappropriate drop in systolic pressure with infusion  (Presenting BP: 98/56 Peak BP: 72/44).   3. No significant arrhythmias were present.   4. There were no symptoms of chest discomfort or significant dyspnea throughout the protocol.     Echocardiographic Description:      Aorta: The aortic root is normal in size, measuring 3.3 cm at sinotubular junction and 3.5 cm at Sinuses of Valsalva. The proximal ascending aorta is normal in size, measuring 3.6 cm across.     Left Atrium: The left atrial volume index is mildly enlarged, measuring 37.58 cc/m2.      Left Ventricle: The left ventricle is normal in size, with an end-diastolic diameter of 5.6 cm, and an end-systolic diameter of 3.2 cm. LV wall thickness is normal, with the septum measuring 0.9 cm and the posterior wall measuring 0.7 cm across. Relative   wall thickness was normal at 0.25, and the LV mass index was 97.4 g/m2 consistent with normal left ventricular mass. There are no regional wall motion abnormalities. Left ventricular systolic function appears normal. Visually estimated ejection fraction   is 60-65%. The LV Doppler derived stroke volume equals 113.0 ccs.     Diastolic indices: E wave velocity 0.9 m/s, E/A ratio 1.5,  msec., E/e' ratio(avg) 7. Diastolic function is normal.     Right Atrium: The right atrium is normal in size, measuring 4.6 cm in length and 3.7 cm in width in the apical view.     Right Ventricle: The right ventricle is normal in size measuring 3.6 cm at the base in the apical right ventricle-focused view. Global right ventricular systolic function appears normal. Tricuspid annular plane systolic excursion (TAPSE) is 2.6 cm.   Tissue Doppler-derived tricuspid annular peak systolic velocity (S prime) is 12.4 cm/s.     Aortic Valve:  The aortic valve is moderately sclerotic with mildly restricted leaflet mobility. The aortic valve is tri-leaflet in structure. The peak velocity obtained across the aortic valve is 2.42 m/s, which translates to a peak gradient of 23 mmHg.   The mean gradient is 13 mmHg. Using a left ventricular outflow tract diameter of 2.2 cm, a left ventricular outflow tract velocity time integral of 30 cm, and a peak instantaneous transvalvular velocity time integral of 39 cm, the calculated aortic   valve area is 2.92 cm2(AVAi is 1.48 cm2/m2), consistent with trivial aortic stenosis.     Mitral Valve:  The mitral valve is normal in structure with normal leaflet mobility.     Pulmonary Valve:  The pulmonic valve is normal in structure with normal leaflet  mobility.     IVC: The IVC is not visualized.     Intracavitary: There is no evidence of pericardial effusion, intracavity mass, thrombi, or vegetation.     Peak Imaging:    Images taken at peak infusion showed left ventricular function augmenting. Left ventricular end systolic volume decreases.     The left ventricle is globally hyperdynamic and no dobutamine induced wall motion abnormalities were identified.       CONCLUSIONS     1 - Normal left ventricular systolic function (EF 60-65%).     2 - No wall motion abnormalities.     3 - Normal left ventricular diastolic function.     4 - Normal right ventricular systolic function .     5 - Aortic valve sclerosis with mildly restrictive leaflet motion without significant stenosis..     No evidence of stress induced myocardial ischemia.         This document has been electronically    SIGNED BY: Luca Bains MD On: 11/02/2018 16:29    This document was originally electronically signed on: 11/02/2018 16:28         2D echo with color flow doppler   Order: [id number]   Status:  Final result   Visible to patient:  No (Not Released) Next appt:  None Dx:  Anasarca; Nonrheumatic aortic valve d...   Details        Narrative     Date of Procedure: 10/29/2018        TEST DESCRIPTION   Technical Quality: This is a technically adequate study.     Aorta: The aortic root is normal in size, measuring 3.3 cm at sinotubular junction and 3.4 cm at Sinuses of Valsalva. The proximal ascending aorta is normal in size, measuring 3.6 cm across.     Left Atrium: The left atrial volume index is mildly enlarged, measuring 38.43 cc/m2.     Left Ventricle: The left ventricle is upper limit of normal, with an end-diastolic diameter of 5.6 cm, and an end-systolic diameter of 2.9 cm. LV wall thickness is normal, with the septum measuring 0.9 cm and the posterior wall measuring 0.7 cm across.   Relative wall thickness was normal at 0.25, and the LV mass index was 95.5 g/m2 consistent with normal  left ventricular mass. There are no regional wall motion abnormalities. Left ventricular systolic function appears hyperdynamic. Visually estimated   ejection fraction is >70%. The LV Doppler derived stroke volume equals 113.0 ccs.     Diastolic indices: E wave velocity 0.9 m/s, E/A ratio 1.7,  msec., E/e' ratio(avg) 7. Diastolic function is normal.     Right Atrium: The right atrium is normal in size, measuring 4.6 cm in length and 3.7 cm in width in the apical view.     Right Ventricle: The right ventricle is normal in size measuring 3.5 cm at the base in the apical right ventricle-focused view. Global right ventricular systolic function appears normal. Tricuspid annular plane systolic excursion (TAPSE) is 2.5 cm.   Tissue Doppler-derived tricuspid annular peak systolic velocity (S prime) is 17.0 cm/s. The estimated PA systolic pressure is 32 mmHg.     Aortic Valve:  The aortic valve is mildly sclerotic with mildly restricted leaflet mobility. The aortic valve is tri-leaflet in structure. The peak velocity obtained across the aortic valve is 2.41 m/s, which translates to a peak gradient of 23 mmHg. The   mean gradient is 14 mmHg. Using a left ventricular outflow tract diameter of 2.1 cm, a left ventricular outflow tract velocity time integral of 33 cm, and a peak instantaneous transvalvular velocity time integral of 44 cm, the calculated aortic valve   area is 2.54 cm2(AVAi is 1.26 cm2/m2).     Mitral Valve:  Mitral valve is normal in structure with normal leaflet mobility.     Tricuspid Valve:  There is trivial tricuspid regurgitation. Tricuspid valve is normal in structure with normal leaflet mobility.     Pulmonary Valve:  Pulmonary valve is normal in structure with normal leaflet mobility.     IVC: IVC is normal in size and collapses > 50% with a sniff, suggesting normal right atrial pressure of 3 mmHg.     Intracavitary: There is no evidence of pericardial effusion, intracavity mass, thrombi, or  vegetation.         CONCLUSIONS     1 - Hyperdynamic left ventricular systolic function (EF >70%).     2 - Normal right ventricular systolic function .     3 - Normal left ventricular diastolic function.     4 - Mild left atrial enlargement.     5 - The estimated PA systolic pressure is 32 mmHg.             This document has been electronically    SIGNED BY: Lui Byrnes MD On: 10/29/2018 10:51                  Transplant Infectious Diseases - Candidacy    Assessment/Plan:     Transplant Candidacy: Based on available information, there are no identified significant barriers to transplantation from an infectious disease standpoint pending a negative Strongyloides IgG and Quantiferon Gold.  Patient currently has a leukocytosis of unclear etiology and has been started on Vanc and Ceftriaxone empirically - rec leukocytosis be resolved prior to transplant.  If does not resolve or source not found, reconsult ID. Patient has fine red abdominal rash - does not appear infectious - derm consult ordered.  Patient had R side pleural effusion drained but no cultures sent. Patient appear non septic on exam. Final determination of transplant candidacy will be made once evaluation is complete and reviewed by the Transplant Selection Committee.    ALISON Potts  Vaccine Needs:  1. Hep A day 0 and 6 months  2. Hep B (high dose 40mcg/dose) today, 1 month and 6 months  3. Prevnar today  4. Pneumovax in 2 months  5. Shingrix as outpt  - Rxs given for FU vaccines       Counseling:I discussed with Femi the risk for increased susceptibility to infections following transplantation including increased risk for infection right after transplant and if rejection should occur.  The patients has been counseled on the importance of vaccinations including but not limited to a yearly flu vaccine.  Specific guidance has been provided to the patient regarding the patients occupation, hobbies and activities to avoid future infectious  complications including but not limited to avoiding undercooked meats and seafood, proper hygiene, and contact with animals.

## 2018-11-06 NOTE — SUBJECTIVE & OBJECTIVE
Subjective:     History of Present Illness:   Femi Enciso is a 53 year old man with T2DM , Liver cirrhosis most likely due to EtOH abundant history of drinking (diagnosed in Sept 2018 in [place]).  He reports jaundice, generalized weakness, nausea, diarrhea, and decreased appetite since Sept 2018.  He is from [place], [place], and Dr. Sharma (patients physician) recommended bringing him to hospital for evaluation. Before admission his serum creatinine baseline was 0.9 to 1.2 since October 1st to October 15th of 2018 (GFR was 64-97 mL/min), which he started with increased creatinine on October 22 of 2018 at 2. At admission on October 27 of 2018 presented with a serum creatinine of 6 and GFR of 9.8 mL/min. In which had to be started next day (10/28/2018) on SLED for clearance and volume removal. Currently has not showed any sygn of renal improvement and continue on HD treatment. [facility] was consulted for evaluation for combined liver and kidney transplant.       Mr. Enciso is a 53 y.o. year old male who is consulted for evaluation of combined liver and kidney transplant.       Immunosuppressants (From admission, onward)    None          Interval History:  Patient evaluated at bedside in post op area, after he had EGD done. No significant event overnight. Last HD treatment was done yesterday. Denies any SOB, fever, chills, nausea or vomiting.     Past Medical and Surgical History: Mr. Enciso has a past medical history of Liver cirrhosis, alcoholic (09/30/2018).  He has no past surgical history on file.    Past Social and Family History: Mr. Enciso reports that  has never smoked. He does not have any smokeless tobacco history on file.His family history is not on file.    Intake/Output - Last 3 Shifts       11/04 0700 - 11/05 0659 11/05 0700 - 11/06 0659 11/06 0700 - 11/07 0659    P.O. 1910 1160     I.V. (mL/kg)   100 (1.2)    Other  550     IV Piggyback       Total Intake(mL/kg) 1910 (23.1) 1710 (20) 100 (1.2)    Urine (mL/kg/hr) 0  "(0)      Emesis/NG output 0      Other  1531     Stool 0 0     Blood 0      Total Output 0 1531     Net +1910 +179 +100           Urine Occurrence  0 x     Stool Occurrence 6 x 8 x 3 x           Review of Systems   Constitutional: Positive for activity change, appetite change and fatigue. Negative for chills and fever.   Respiratory: Positive for shortness of breath and wheezing. Negative for cough.    Cardiovascular: Negative for chest pain and leg swelling.   Gastrointestinal: Positive for abdominal distention and nausea. Negative for constipation, diarrhea and rectal pain.   Genitourinary: Negative for difficulty urinating and dysuria.   Musculoskeletal: Negative.    Skin: Negative for color change and rash.   Allergic/Immunologic: Negative for immunocompromised state.   Neurological: Negative for dizziness and light-headedness.   Hematological: Bruises/bleeds easily.   Psychiatric/Behavioral: Positive for confusion.   All other systems reviewed and are negative.    Objective:     Vital Signs (Most Recent):  Temp: 96.2 °F (35.7 °C) (11/06/18 1130)  Pulse: 80 (11/06/18 1219)  Resp: 16 (11/06/18 1130)  BP: (!) 99/55 (11/06/18 1219)  SpO2: (!) 94 % (11/06/18 1130) Vital Signs (24h Range):  Temp:  [37.4 °F (3 °C)-99.1 °F (37.3 °C)] 96.2 °F (35.7 °C)  Pulse:  [75-89] 80  Resp:  [15-18] 16  SpO2:  [93 %-100 %] 94 %  BP: ()/(48-68) 99/55     Weight: 85.4 kg (188 lb 4.4 oz)  Height: 5' 10" (177.8 cm)  Body mass index is 27.01 kg/m².    Physical Exam   Constitutional: He appears well-developed. He appears cachectic. He is cooperative. No distress.   HENT:   Head: Normocephalic and atraumatic.   Eyes: Conjunctivae are normal. Pupils are equal, round, and reactive to light.   Neck: Trachea normal and normal range of motion. Neck supple. No JVD present.   Cardiovascular: Normal rate, regular rhythm, S1 normal, S2 normal and normal pulses. Exam reveals no gallop and no friction rub.   No murmur heard.  Pulmonary/Chest: " Effort normal. He has rales.   Abdominal: Soft. Bowel sounds are normal. He exhibits distension and ascites. There is no tenderness.   Musculoskeletal: Normal range of motion.   Neurological: He is alert.   Skin: Skin is warm and dry. Capillary refill takes less than 2 seconds.   Psychiatric: He has a normal mood and affect. His behavior is normal.   Vitals reviewed.      Significant Labs:  CBC:   Recent Labs   Lab 11/04/18 0424 11/05/18  0350 11/06/18  0525   WBC 11.75 15.97* 15.34*   RBC 3.16* 3.08* 3.09*   HGB 10.4* 10.2* 10.4*   HCT 30.6* 30.0* 31.3*   PLT 52* 68* 61*   MCV 97 97 101*   MCH 32.9* 33.1* 33.7*   MCHC 34.0 34.0 33.2     BMP:   Recent Labs   Lab 11/04/18 0424 11/05/18  0350 11/06/18  0525   GLU 88 110 83   * 133* 139   K 4.4 4.7 5.2*   CL 99 99 100   CO2 23 21* 23   BUN 64* 77* 61*   CREATININE 7.0* 8.1* 7.2*   CALCIUM 8.5* 8.3* 8.5*     CMP:   Recent Labs   Lab 11/04/18 0424 11/05/18  0350 11/06/18  0525   GLU 88 110 83   CALCIUM 8.5* 8.3* 8.5*   ALBUMIN 2.4* 2.2* 2.5*   PROT 5.1* 4.9* 5.2*   * 133* 139   K 4.4 4.7 5.2*   CO2 23 21* 23   CL 99 99 100   BUN 64* 77* 61*   CREATININE 7.0* 8.1* 7.2*   ALKPHOS 211* 213* 206*   ALT 33 32 29   AST 72* 69* 64*     ABGs: No results for input(s): PH, PCO2, HCO3, POCSATURATED, BE in the last 168 hours.

## 2018-11-06 NOTE — ANESTHESIA RELEASE NOTE
"Anesthesia Release from PACU Note    Patient: Femi Enciso    Procedure(s) Performed: Procedure(s) (LRB):  EGD (ESOPHAGOGASTRODUODENOSCOPY) (N/A)    Anesthesia type: general    Post pain: Adequate analgesia    Post assessment: no apparent anesthetic complications    Last Vitals:   Visit Vitals  BP (!) 99/55   Pulse 80   Temp 35.7 °C (96.2 °F) (Oral)   Resp 16   Ht 5' 10" (1.778 m)   Wt 85.4 kg (188 lb 4.4 oz)   SpO2 (!) 94%   BMI 27.01 kg/m²       Post vital signs: stable    Level of consciousness: awake, alert  and oriented    Nausea/Vomiting: no nausea/no vomiting    Complications: none    Airway Patency: patent    Respiratory: unassisted    Cardiovascular: stable and blood pressure at baseline    Hydration: euvolemic  "

## 2018-11-06 NOTE — ASSESSMENT & PLAN NOTE
Femi Enciso is a 53 y.o. male with alcoholic cirrhosis admitted for liver transplantation evaluation. Hospital course complicated by hepatorenal syndrome requiring dialysis and sepsis believed secondary to UTI. Current infectious work-up today otherwise unremarkable. Now with a ~1 week hx of ill-defined, evanescent and erythematous extremity rash. Dermatology consulted for evaluation.     Evanescent and erythematous macular rash: favor drug induced rash due to cefepime. Patient with possible hx of penicillin allergy with recent 7 day course of cefepime (cross reacts w/ penicillins) with development of intermittent, itchy red rash. Drug rashes tend to occur 5-7 days after initiation of antibiotics. However, given patient's continued leukocytosis (possible incompletely treated UTI?) we cannot rule out a more concerning infectious process (TSS from strep or staph). Lack of scarlatiniform rash, high grade fever, palmar involvement or desquamation makes this less likely. Little concern for bullous drug eruption (TEN/SJS unlikely given lack of bulla), DRESS (no eosinophilia, no angioedema).   - Patient switched to ceftriaxone and vancomycin today; watch for continued rash with use of ceftriaxone.   - Would recommend using previous urine culture results to start patient on antibiotics that do not cross reaction with penicillin. Possible that UTI was incompletely treated given uptrending WBC after stopping cefepime.   - Blood cultures drawn on 11/05 w/ NGTD.   - Consider repeat urine culture and u/a although unlikely to be + given recent abx exposure.   - Start TAC 0.1% cream; apply to the affected sites BID.   - Start hydroxyzine     -

## 2018-11-06 NOTE — TRANSFER OF CARE
"Anesthesia Transfer of Care Note    Patient: Femi Enciso    Procedure(s) Performed: Procedure(s) (LRB):  EGD (ESOPHAGOGASTRODUODENOSCOPY) (N/A)    Patient location: [facility]    Anesthesia Type: general    Transport from OR: Transported from OR on room air with adequate spontaneous ventilation    Post pain: adequate analgesia    Post assessment: no apparent anesthetic complications and tolerated procedure well    Post vital signs: stable    Level of consciousness: alert, oriented and awake    Nausea/Vomiting: no nausea/vomiting    Complications: none    Transfer of care protocol was followed      Last vitals:   Visit Vitals  BP (!) 92/53 (BP Location: Left arm, Patient Position: Lying)   Pulse 81   Temp 37.1 °C (98.8 °F) (Temporal)   Resp 16   Ht 5' 10" (1.778 m)   Wt 85.4 kg (188 lb 4.4 oz)   SpO2 99%   BMI 27.01 kg/m²     "

## 2018-11-06 NOTE — HPI
Femi Enciso is a 53 year old man with T2DM , Liver cirrhosis most likely due to EtOH abundant history of drinking (diagnosed in Sept 2018 in [place]).  He reports jaundice, generalized weakness, nausea, diarrhea, and decreased appetite since Sept 2018.  He is from [place], [place], and Dr. Sharma (patients physician) recommended bringing him to hospital for evaluation. Before admission his serum creatinine baseline was 0.9 to 1.2 since October 1st to October 15th of 2018 (GFR was 64-97 mL/min), which he started with increased creatinine on October 22 of 2018 at 2. At admission on October 27 of 2018 presented with a serum creatinine of 6 and GFR of 9.8 mL/min. In which had to be started next day (10/28/2018) on SLED for clearance and volume removal. Currently has not showed any sygn of renal improvement and continue on HD treatment. [facility]TM was consulted for evaluation for combined liver and kidney transplant.

## 2018-11-06 NOTE — ANESTHESIA POSTPROCEDURE EVALUATION
"Anesthesia Post Evaluation    Patient: Femi Enciso    Procedure(s) Performed: Procedure(s) (LRB):  EGD (ESOPHAGOGASTRODUODENOSCOPY) (N/A)    Final Anesthesia Type: general  Patient location during evaluation: PACU  Patient participation: Yes- Able to Participate  Level of consciousness: awake and alert  Post-procedure vital signs: reviewed and stable  Pain management: adequate  Airway patency: patent  PONV status at discharge: No PONV  Anesthetic complications: no      Cardiovascular status: blood pressure returned to baseline  Respiratory status: unassisted  Hydration status: euvolemic  Follow-up not needed.        Visit Vitals  BP (!) 99/55   Pulse 80   Temp 35.7 °C (96.2 °F) (Oral)   Resp 16   Ht 5' 10" (1.778 m)   Wt 85.4 kg (188 lb 4.4 oz)   SpO2 (!) 94%   BMI 27.01 kg/m²       Pain/Torrey Score: Pain Assessment Performed: Yes (11/6/2018 11:05 AM)  Presence of Pain: denies (11/6/2018 11:05 AM)  Pain Rating Prior to Med Admin: 7 (11/6/2018 12:24 PM)  Pain Rating Post Med Admin: 0 (11/5/2018 10:15 AM)  Torrey Score: 10 (11/6/2018 11:25 AM)        "

## 2018-11-06 NOTE — NURSING
PHARM.D. PRE-TRANSPLANT NOTE:    This patient's medication therapy was evaluated as part of his pre-transplant evaluation.    The following pharmacologic concerns were noted: None    This patient's medication profile was reviewed for contraindications for DAA Hepatitis C therapy:    [x]  No current inducers of CYP 3A4 or PGP  [x]  No amiodarone on this patient's EMR profile in the last 24 months  [x]  No past or current atrial fibrillation on this patient's EMR profile       No current facility-administered medications for this visit.      No current outpatient medications on file.     Facility-Administered Medications Ordered in Other Visits   Medication Dose Route Frequency Provider Last Rate Last Dose    acetaminophen tablet 650 mg  650 mg Oral Q4H PRN Hai Wylie MD   650 mg at 11/05/18 0907    albumin human 25% bottle 25 g  25 g Intravenous PRN Keith Fabian  mL/hr at 11/02/18 2200 25 g at 11/02/18 2200    albuterol-ipratropium 2.5 mg-0.5 mg/3 mL nebulizer solution 3 mL  3 mL Nebulization Q6H PRN Dante Cash MD   3 mL at 11/05/18 1507    cefTRIAXone injection 1 g  1 g Intravenous Q24H Hai Wylie MD   1 g at 11/06/18 1313    dextrose 50% injection 12.5 g  12.5 g Intravenous PRN Hai Wylie MD        dextrose 50% injection 25 g  25 g Intravenous PRN Hai Wylie MD        fluconazole tablet 100 mg  100 mg Oral Daily Dante Cash MD   100 mg at 11/05/18 1136    folic acid tablet 1 mg  1 mg Oral Daily HELENA Fitch MD   1 mg at 11/05/18 1136    glucagon (human recombinant) injection 1 mg  1 mg Intramuscular PRN Hai Wylie MD        glucose chewable tablet 16 g  16 g Oral PRN Hai Wylie MD        glucose chewable tablet 24 g  24 g Oral PRN Hai Wylie MD        [START ON 11/7/2018] hepatitis A virus vaccine (PF) 1,440 Units  1,440 Units Intramuscular Once Adelfo Remy Jr., PA        hepatitis B virus vacc.rec(PF) injection 40 mcg  40 mcg Intramuscular  vaccine x 1 dose ALISON Huerta Jr.        insulin aspart U-100 pen 0-5 Units  0-5 Units Subcutaneous QID (AC + HS) PRN Hai Wylie MD        lactulose 20 gram/30 mL solution Soln 10 g  10 g Oral BID Dante Cash MD   10 g at 11/05/18 2105    lidocaine 5 % patch 1 patch  1 patch Transdermal Q24H Hai Wylie MD   1 patch at 11/05/18 2101    lidocaine 5 % patch 1 patch  1 patch Transdermal Q24H Thalia Momin MD   1 patch at 11/05/18 1545    midodrine tablet 15 mg  15 mg Oral TID Dante Cash MD   15 mg at 11/06/18 1221    multivitamin tablet 1 tablet  1 tablet Oral Daily HELENA Fitch MD   1 tablet at 11/05/18 1135    ondansetron disintegrating tablet 8 mg  8 mg Oral Q6H PRN Hai Wylie MD   8 mg at 11/05/18 2357    pantoprazole EC tablet 40 mg  40 mg Oral Daily Hai Wylie MD   40 mg at 11/05/18 1135    pneumoc 13-rahul conj-dip cr(PF) (PREVNAR 13 (PF)) 0.5 mL  0.5 mL Intramuscular Once ALISON Huerta Jr.        promethazine (PHENERGAN) 12.5 mg in dextrose 5 % 50 mL IVPB  12.5 mg Intravenous Q6H PRN Hai Wylie  mL/hr at 11/04/18 0247 12.5 mg at 11/04/18 0247    promethazine (PHENERGAN) 6.25 mg in dextrose 5 % 50 mL IVPB  6.25 mg Intravenous Q6H PRN Dante Cash MD        ramelteon tablet 8 mg  8 mg Oral Nightly PRN Tk Orona PA-C   8 mg at 11/04/18 0112    rifAXIMin tablet 550 mg  550 mg Oral BID Hai Wylie MD   550 mg at 11/05/18 2105    sevelamer carbonate tablet 800 mg  800 mg Oral TID WM Hai Wylie MD   800 mg at 11/05/18 1708    sodium bicarbonate tablet 1,300 mg  1,300 mg Oral TID Hai Wylie MD   1,300 mg at 11/05/18 2104    sodium chloride 0.9% flush 3 mL  3 mL Intravenous PRN Renée Jules MD        sodium chloride 0.9% flush 5 mL  5 mL Intravenous PRN Hai Wylie MD        thiamine tablet 100 mg  100 mg Oral Daily Hai Wylie MD   100 mg at 11/05/18 1135    traMADol tablet 50 mg  50 mg Oral Q8H PRN Hai Wylie,  MD   50 mg at 11/06/18 1224    triamcinolone acetonide 0.1% cream   Topical (Top) BID Hai Wylie MD             Currently the patient is responsible for preparing / administering this patient's medications on a daily basis.  I am available for consultation and can be contacted, as needed by the other members of the Liver Transplant team.

## 2018-11-06 NOTE — ASSESSMENT & PLAN NOTE
- According to UNOS candidate criteria states that patient with sustained acute kidney injury has to be on dialysis for at least 6 consecutive weeks   - In which currently doesn't meet criteria for combined liver and kidney transplant. If patient stays on HD up to December 6 of 2018 please re-consult.

## 2018-11-06 NOTE — HPI
"Femi Enciso is a 53 y.o. male w/ HTN, HLD, T2DM, and alcoholic cirrhosis currently admitted for acute alcoholic cirrhosis decompensation and liver transplant evaluation. Hospital course complicated by hepatorenal on dialysis. Urine culture + for E cloacae but UA inconsistent; patient was treated with 7d of cefepime (last received on 11/02).  He also had a R sided pleural effusion that was drained - no cultures done. Previous admission for possible PNA. Patient denies any recent fever, chills, or infective illnesses. Dermatology was consulted for acute onset of an erythematous extremity rash.     Patient reports developing a rash about 1 week ago. Per wife, rash is intermittent in nature. Patient reports developing symptoms of itch (easily attributed to his hx of liver failure) prior to the onset of the rash. Rash described as "splotchy redness" that began on the flexural aspects of the extremitites and spread to the lower extremities and upper arms. Patient denies significant involvement of the abdomen. Denies oral ulcers, genital involvement, blister formation, facial swelling, tenderness of the skin and/or desquamation. Of note, patient has a hx of penicillin with n/v and rash listed as the historic reaction. However, the patient is also listed as tolerating cefepime 10/27/2018. Per patient, he has been told his whole life that he is allergic to penicillin but he denies knowing he's had a rash to the medication.     On admission, WBC of 15.8. Down trended to 9.7 after 5 days of cefepime. Cefepime was stopped on 11/02 with increasing WBC thereafter, today at 15.34. Patient started on ceftriaxone and vancomycin today. Blood cultures drawn on 11/5 are negative.   "

## 2018-11-06 NOTE — SUBJECTIVE & OBJECTIVE
Past Medical History:   Diagnosis Date    Liver cirrhosis, alcoholic 09/30/2018       History reviewed. No pertinent surgical history.  Family History     None        Tobacco Use    Smoking status: Never Smoker   Substance and Sexual Activity    Alcohol use: Not on file    Drug use: Not on file    Sexual activity: Not on file       Review of patient's allergies indicates:   Allergen Reactions    Penicillins Nausea And Vomiting and Rash     Tolerated cefepime 10/27/18       Medications:  Continuous Infusions:  Scheduled Meds:   cefTRIAXone (ROCEPHIN) IVPB  1 g Intravenous Q24H    fluconazole  100 mg Oral Daily    folic acid  1 mg Oral Daily    lactulose  10 g Oral BID    lidocaine  1 patch Transdermal Q24H    lidocaine  1 patch Transdermal Q24H    midodrine  15 mg Oral TID    multivitamin  1 tablet Oral Daily    pantoprazole  40 mg Oral Daily    rifAXImin  550 mg Oral BID    sevelamer carbonate  800 mg Oral TID WM    sodium bicarbonate  1,300 mg Oral TID    thiamine  100 mg Oral Daily    triamcinolone acetonide 0.1%   Topical (Top) BID    vancomycin (VANCOCIN) IVPB  15 mg/kg (Dosing Weight) Intravenous Once     PRN Meds:acetaminophen, albumin human 25%, albuterol-ipratropium, dextrose 50%, dextrose 50%, glucagon (human recombinant), glucose, glucose, insulin aspart U-100, ondansetron, promethazine (PHENERGAN) IVPB, promethazine (PHENERGAN) IVPB, ramelteon, sodium chloride 0.9%, sodium chloride 0.9%, traMADol    Review of Systems   Constitutional: Positive for chills and fatigue. Negative for fever.   HENT: Negative for mouth sores.    Eyes: Negative for itching.   Gastrointestinal: Positive for nausea.   Genitourinary: Negative for genital sores and genital itching.   Skin: Positive for itching and rash.     Objective:     Vital Signs (Most Recent):  Temp: 96.2 °F (35.7 °C) (11/06/18 1130)  Pulse: 80 (11/06/18 1219)  Resp: 16 (11/06/18 1130)  BP: (!) 99/55 (11/06/18 1219)  SpO2: (!) 94 %  (11/06/18 1130) Vital Signs (24h Range):  Temp:  [37.4 °F (3 °C)-99.1 °F (37.3 °C)] 96.2 °F (35.7 °C)  Pulse:  [75-89] 80  Resp:  [15-18] 16  SpO2:  [93 %-100 %] 94 %  BP: ()/(48-68) 99/55     Weight: 85.4 kg (188 lb 4.4 oz)  Body mass index is 27.01 kg/m².    Physical Exam   Constitutional: He appears cachectic.   Neurological: He is alert.   Psychiatric: He has a normal mood and affect.   Skin:   Areas Examined (abnormalities noted in diagram):   Head / Face Inspection Performed  Neck Inspection Performed  Chest / Axilla Inspection Performed  Abdomen Inspection Performed  RUE Inspected  LUE Inspection Performed  RLE Inspected  LLE Inspection Performed                                  Significant Labs:   CBC:   Recent Labs   Lab 11/05/18  0350 11/06/18  0525   WBC 15.97* 15.34*   HGB 10.2* 10.4*   HCT 30.0* 31.3*   PLT 68* 61*       Significant Imaging: None

## 2018-11-06 NOTE — PT/OT/SLP PROGRESS
Occupational Therapy      Patient Name:  Femi Enciso   MRN:  [id number]    Patient not seen today secondary to declining due to chest pain. Will follow-up per schedule.    ISACC Whitaker  11/6/2018

## 2018-11-06 NOTE — PLAN OF CARE
Problem: Patient Care Overview  Goal: Plan of Care Review  Outcome: Ongoing (interventions implemented as appropriate)    POC reviewed patient and spouse. AAOX4. VVS stable. Episode of low b/p asymptomatic. EGD done 3 varices. Pain management with Tramadol.  BM X3. Safety precaution maintained. NPO at MN for Perm Catheter in am. No complaint of nausea or vomiting. WCTM

## 2018-11-07 ENCOUNTER — TELEPHONE (OUTPATIENT)
Dept: TRANSPLANT | Facility: CLINIC | Age: 53
End: 2018-11-07

## 2018-11-07 ENCOUNTER — COMMITTEE REVIEW (OUTPATIENT)
Dept: TRANSPLANT | Facility: CLINIC | Age: 53
End: 2018-11-07

## 2018-11-07 LAB
ABO + RH BLD: NORMAL
ALBUMIN FLD-MCNC: 0.6 G/DL
ALBUMIN SERPL BCP-MCNC: 2.3 G/DL
ALP SERPL-CCNC: 227 U/L
ALT SERPL W/O P-5'-P-CCNC: 28 U/L
ANION GAP SERPL CALC-SCNC: 15 MMOL/L
ANISOCYTOSIS BLD QL SMEAR: SLIGHT
APPEARANCE FLD: NORMAL
AST SERPL-CCNC: 63 U/L
BASOPHILS # BLD AUTO: 0.11 K/UL
BASOPHILS NFR BLD: 0.7 %
BILIRUB SERPL-MCNC: 37.4 MG/DL
BLD GP AB SCN CELLS X3 SERPL QL: NORMAL
BLD PROD TYP BPU: NORMAL
BLOOD UNIT EXPIRATION DATE: NORMAL
BLOOD UNIT TYPE CODE: 5100
BLOOD UNIT TYPE: NORMAL
BODY FLD TYPE: NORMAL
BODY FLUID SOURCE, LDH: NORMAL
BUN SERPL-MCNC: 76 MG/DL
CALCIUM SERPL-MCNC: 8.8 MG/DL
CHLORIDE SERPL-SCNC: 98 MMOL/L
CO2 SERPL-SCNC: 25 MMOL/L
CODING SYSTEM: NORMAL
COLOR FLD: YELLOW
CREAT SERPL-MCNC: 8.7 MG/DL
DIFFERENTIAL METHOD: ABNORMAL
DISPENSE STATUS: NORMAL
EOSINOPHIL # BLD AUTO: 0.5 K/UL
EOSINOPHIL NFR BLD: 3.6 %
EOSINOPHIL NFR FLD MANUAL: 1 %
ERYTHROCYTE [DISTWIDTH] IN BLOOD BY AUTOMATED COUNT: 19 %
EST. GFR  (AFRICAN AMERICAN): 7.2 ML/MIN/1.73 M^2
EST. GFR  (NON AFRICAN AMERICAN): 6.3 ML/MIN/1.73 M^2
GLUCOSE SERPL-MCNC: 81 MG/DL
HCT VFR BLD AUTO: 32.8 %
HGB BLD-MCNC: 10.9 G/DL
HYPOCHROMIA BLD QL SMEAR: ABNORMAL
IMM GRANULOCYTES # BLD AUTO: 0.14 K/UL
IMM GRANULOCYTES NFR BLD AUTO: 0.9 %
INR PPP: 2.1
LDH FLD L TO P-CCNC: 46 U/L
LDH SERPL L TO P-CCNC: 175 U/L
LYMPHOCYTES # BLD AUTO: 1.3 K/UL
LYMPHOCYTES NFR BLD: 8.9 %
LYMPHOCYTES NFR FLD MANUAL: 14 %
MAGNESIUM SERPL-MCNC: 2.1 MG/DL
MCH RBC QN AUTO: 33.5 PG
MCHC RBC AUTO-ENTMCNC: 33.2 G/DL
MCV RBC AUTO: 101 FL
MESOTHL CELL NFR FLD MANUAL: 2 %
MONOCYTES # BLD AUTO: 1.7 K/UL
MONOCYTES NFR BLD: 11.7 %
MONOS+MACROS NFR FLD MANUAL: 25 %
NEUTROPHILS # BLD AUTO: 11.1 K/UL
NEUTROPHILS NFR BLD: 74.2 %
NEUTROPHILS NFR FLD MANUAL: 58 %
NRBC BLD-RTO: 0 /100 WBC
NUM UNITS TRANS FFP: NORMAL
OVALOCYTES BLD QL SMEAR: ABNORMAL
PHOSPHATE SERPL-MCNC: 6.7 MG/DL
PLATELET # BLD AUTO: 84 K/UL
PMV BLD AUTO: 12.4 FL
POCT GLUCOSE: 79 MG/DL (ref 70–110)
POCT GLUCOSE: 81 MG/DL (ref 70–110)
POCT GLUCOSE: 87 MG/DL (ref 70–110)
POIKILOCYTOSIS BLD QL SMEAR: SLIGHT
POLYCHROMASIA BLD QL SMEAR: ABNORMAL
POTASSIUM SERPL-SCNC: 5.3 MMOL/L
PROT FLD-MCNC: 1 G/DL
PROT SERPL-MCNC: 5.2 G/DL
PROTHROMBIN TIME: 20.8 SEC
RBC # BLD AUTO: 3.25 M/UL
SODIUM SERPL-SCNC: 138 MMOL/L
SPECIMEN SOURCE: NORMAL
SPECIMEN SOURCE: NORMAL
STRONGYLOIDES ANTIBODY IGG: NEGATIVE
VANCOMYCIN SERPL-MCNC: 21.9 UG/ML
WBC # BLD AUTO: 14.9 K/UL
WBC # FLD: 114 /CU MM

## 2018-11-07 PROCEDURE — 85610 PROTHROMBIN TIME: CPT | Mod: NTX

## 2018-11-07 PROCEDURE — 89051 BODY FLUID CELL COUNT: CPT | Mod: NTX

## 2018-11-07 PROCEDURE — 87075 CULTR BACTERIA EXCEPT BLOOD: CPT | Mod: NTX

## 2018-11-07 PROCEDURE — 90471 IMMUNIZATION ADMIN: CPT | Mod: NTX | Performed by: PHYSICIAN ASSISTANT

## 2018-11-07 PROCEDURE — 90633 HEPA VACC PED/ADOL 2 DOSE IM: CPT | Mod: NTX | Performed by: PHYSICIAN ASSISTANT

## 2018-11-07 PROCEDURE — 80202 ASSAY OF VANCOMYCIN: CPT | Mod: NTX

## 2018-11-07 PROCEDURE — 76937 US GUIDE VASCULAR ACCESS: CPT | Mod: 26,,, | Performed by: INTERNAL MEDICINE

## 2018-11-07 PROCEDURE — 63600175 PHARM REV CODE 636 W HCPCS: Mod: NTX | Performed by: HOSPITALIST

## 2018-11-07 PROCEDURE — 20600001 HC STEP DOWN PRIVATE ROOM: Mod: NTX

## 2018-11-07 PROCEDURE — 86060 ANTISTREPTOLYSIN O TITER: CPT | Mod: NTX

## 2018-11-07 PROCEDURE — 90935 HEMODIALYSIS ONE EVALUATION: CPT | Mod: NTX,,, | Performed by: INTERNAL MEDICINE

## 2018-11-07 PROCEDURE — 36415 COLL VENOUS BLD VENIPUNCTURE: CPT | Mod: NTX

## 2018-11-07 PROCEDURE — 77001 FLUOROGUIDE FOR VEIN DEVICE: CPT | Mod: 26,,, | Performed by: INTERNAL MEDICINE

## 2018-11-07 PROCEDURE — 25000003 PHARM REV CODE 250: Mod: NTX | Performed by: INTERNAL MEDICINE

## 2018-11-07 PROCEDURE — 36558 INSERT TUNNELED CV CATH: CPT | Mod: ,,, | Performed by: INTERNAL MEDICINE

## 2018-11-07 PROCEDURE — 25000003 PHARM REV CODE 250: Mod: NTX | Performed by: HOSPITALIST

## 2018-11-07 PROCEDURE — 82042 OTHER SOURCE ALBUMIN QUAN EA: CPT | Mod: NTX

## 2018-11-07 PROCEDURE — 87070 CULTURE OTHR SPECIMN AEROBIC: CPT | Mod: NTX

## 2018-11-07 PROCEDURE — 77001 FLUOROGUIDE FOR VEIN DEVICE: CPT | Performed by: INTERNAL MEDICINE

## 2018-11-07 PROCEDURE — 85025 COMPLETE CBC W/AUTO DIFF WBC: CPT | Mod: NTX

## 2018-11-07 PROCEDURE — 83615 LACTATE (LD) (LDH) ENZYME: CPT | Mod: 91,NTX

## 2018-11-07 PROCEDURE — 80053 COMPREHEN METABOLIC PANEL: CPT | Mod: NTX

## 2018-11-07 PROCEDURE — C1894 INTRO/SHEATH, NON-LASER: HCPCS | Mod: NTX | Performed by: INTERNAL MEDICINE

## 2018-11-07 PROCEDURE — 84100 ASSAY OF PHOSPHORUS: CPT | Mod: NTX

## 2018-11-07 PROCEDURE — 36558 INSERT TUNNELED CV CATH: CPT | Performed by: INTERNAL MEDICINE

## 2018-11-07 PROCEDURE — 90935 PR HEMODIALYSIS, ONE EVALUATION: ICD-10-PCS | Mod: NTX,,, | Performed by: INTERNAL MEDICINE

## 2018-11-07 PROCEDURE — 76937 PR  US GUIDE, VASCULAR ACCESS: ICD-10-PCS | Mod: 26,,, | Performed by: INTERNAL MEDICINE

## 2018-11-07 PROCEDURE — 63600175 PHARM REV CODE 636 W HCPCS: Mod: NTX | Performed by: PHYSICIAN ASSISTANT

## 2018-11-07 PROCEDURE — 84157 ASSAY OF PROTEIN OTHER: CPT | Mod: NTX

## 2018-11-07 PROCEDURE — P9017 PLASMA 1 DONOR FRZ W/IN 8 HR: HCPCS | Mod: NTX

## 2018-11-07 PROCEDURE — 83735 ASSAY OF MAGNESIUM: CPT | Mod: NTX

## 2018-11-07 PROCEDURE — 90935 HEMODIALYSIS ONE EVALUATION: CPT | Mod: NTX

## 2018-11-07 PROCEDURE — 86901 BLOOD TYPING SEROLOGIC RH(D): CPT | Mod: NTX

## 2018-11-07 PROCEDURE — 76937 US GUIDE VASCULAR ACCESS: CPT | Performed by: INTERNAL MEDICINE

## 2018-11-07 PROCEDURE — 83615 LACTATE (LD) (LDH) ENZYME: CPT | Mod: NTX

## 2018-11-07 PROCEDURE — 99233 PR SUBSEQUENT HOSPITAL CARE,LEVL III: ICD-10-PCS | Mod: NTX,,, | Performed by: HOSPITALIST

## 2018-11-07 PROCEDURE — C1769 GUIDE WIRE: HCPCS | Mod: NTX | Performed by: INTERNAL MEDICINE

## 2018-11-07 PROCEDURE — 99233 SBSQ HOSP IP/OBS HIGH 50: CPT | Mod: NTX,,, | Performed by: HOSPITALIST

## 2018-11-07 PROCEDURE — C1750 CATH, HEMODIALYSIS,LONG-TERM: HCPCS | Mod: NTX | Performed by: INTERNAL MEDICINE

## 2018-11-07 PROCEDURE — 77001 CHG FLUOROGUIDE CNTRL VEN ACCESS,PLACE,REPLACE,REMOVE: ICD-10-PCS | Mod: 26,,, | Performed by: INTERNAL MEDICINE

## 2018-11-07 PROCEDURE — 99153 MOD SED SAME PHYS/QHP EA: CPT | Mod: NTX | Performed by: INTERNAL MEDICINE

## 2018-11-07 PROCEDURE — 99152 MOD SED SAME PHYS/QHP 5/>YRS: CPT | Mod: NTX | Performed by: INTERNAL MEDICINE

## 2018-11-07 PROCEDURE — 36558 PR INSERT TUNNELED CV CATH W/O PORT OR PUMP: ICD-10-PCS | Mod: ,,, | Performed by: INTERNAL MEDICINE

## 2018-11-07 DEVICE — CATH GLIDEPATH 14.5F 23CM 28CM: Type: IMPLANTABLE DEVICE | Site: NECK | Status: FUNCTIONAL

## 2018-11-07 RX ORDER — HYDROCODONE BITARTRATE AND ACETAMINOPHEN 500; 5 MG/1; MG/1
TABLET ORAL
Status: DISCONTINUED | OUTPATIENT
Start: 2018-11-07 | End: 2018-11-11

## 2018-11-07 RX ORDER — FENTANYL CITRATE 50 UG/ML
INJECTION, SOLUTION INTRAMUSCULAR; INTRAVENOUS
Status: DISCONTINUED | OUTPATIENT
Start: 2018-11-07 | End: 2018-11-07 | Stop reason: HOSPADM

## 2018-11-07 RX ORDER — LIDOCAINE HYDROCHLORIDE 20 MG/ML
INJECTION, SOLUTION EPIDURAL; INFILTRATION; INTRACAUDAL; PERINEURAL
Status: DISCONTINUED | OUTPATIENT
Start: 2018-11-07 | End: 2018-11-07 | Stop reason: HOSPADM

## 2018-11-07 RX ORDER — SODIUM CHLORIDE 9 MG/ML
INJECTION, SOLUTION INTRAVENOUS
Status: DISCONTINUED | OUTPATIENT
Start: 2018-11-07 | End: 2018-11-08

## 2018-11-07 RX ORDER — MIDAZOLAM HYDROCHLORIDE 2 MG/2ML
INJECTION, SOLUTION INTRAMUSCULAR; INTRAVENOUS
Status: DISCONTINUED | OUTPATIENT
Start: 2018-11-07 | End: 2018-11-07 | Stop reason: HOSPADM

## 2018-11-07 RX ORDER — HEPARIN SODIUM 1000 [USP'U]/ML
1000 INJECTION, SOLUTION INTRAVENOUS; SUBCUTANEOUS
Status: DISCONTINUED | OUTPATIENT
Start: 2018-11-07 | End: 2018-11-11

## 2018-11-07 RX ORDER — SODIUM CHLORIDE 9 MG/ML
INJECTION, SOLUTION INTRAVENOUS ONCE
Status: COMPLETED | OUTPATIENT
Start: 2018-11-07 | End: 2018-11-07

## 2018-11-07 RX ADMIN — HEPATITIS A VACCINE 1440 UNITS: 720 INJECTION, SUSPENSION INTRAMUSCULAR at 11:11

## 2018-11-07 RX ADMIN — FLUCONAZOLE 100 MG: 100 TABLET ORAL at 10:11

## 2018-11-07 RX ADMIN — SODIUM BICARBONATE 650 MG TABLET 1300 MG: at 10:11

## 2018-11-07 RX ADMIN — HEPARIN SODIUM 1000 UNITS: 1000 INJECTION, SOLUTION INTRAVENOUS; SUBCUTANEOUS at 06:11

## 2018-11-07 RX ADMIN — RIFAXIMIN 550 MG: 550 TABLET ORAL at 09:11

## 2018-11-07 RX ADMIN — LACTULOSE 20 G: 20 SOLUTION ORAL at 10:11

## 2018-11-07 RX ADMIN — Medication 100 MG: at 10:11

## 2018-11-07 RX ADMIN — THERA TABS 1 TABLET: TAB at 10:11

## 2018-11-07 RX ADMIN — MIDODRINE HYDROCHLORIDE 15 MG: 5 TABLET ORAL at 11:11

## 2018-11-07 RX ADMIN — SODIUM CHLORIDE: 0.9 INJECTION, SOLUTION INTRAVENOUS at 03:11

## 2018-11-07 RX ADMIN — LACTULOSE 10 G: 20 SOLUTION ORAL at 09:11

## 2018-11-07 RX ADMIN — SODIUM BICARBONATE 650 MG TABLET 1300 MG: at 09:11

## 2018-11-07 RX ADMIN — FOLIC ACID 1 MG: 1 TABLET ORAL at 10:11

## 2018-11-07 RX ADMIN — TRIAMCINOLONE ACETONIDE: 1 CREAM TOPICAL at 11:11

## 2018-11-07 RX ADMIN — PANTOPRAZOLE SODIUM 40 MG: 40 TABLET, DELAYED RELEASE ORAL at 11:11

## 2018-11-07 RX ADMIN — MIDODRINE HYDROCHLORIDE 15 MG: 5 TABLET ORAL at 09:11

## 2018-11-07 RX ADMIN — LIDOCAINE 1 PATCH: 50 PATCH CUTANEOUS at 09:11

## 2018-11-07 RX ADMIN — RIFAXIMIN 550 MG: 550 TABLET ORAL at 10:11

## 2018-11-07 RX ADMIN — MIDODRINE HYDROCHLORIDE 15 MG: 5 TABLET ORAL at 03:11

## 2018-11-07 NOTE — COMMITTEE REVIEW
Femi Enciso's case presented to selection committee.  Patient has been accepted for liver transplant due to Alcoholic Cirrhosis and complications of end stage liver disease including hyperbilirubinemia, hypoalbuminemia, ascites, severe malnutrition, encephalopathy, portal hypertension, thrombocytopenia and DEL on dialysis, hepatosplenomegaly with a MELD score of 42.  Patient has no absolute contraindications for liver transplant.  Patient will be listed pending financial approval.    Patient will accept HBcAb positive livers.  Patient will accept HCVAB positive livers.  Patient will accept DCD livers.  Patient will accept HCV JAVIER positive livers  Patient will accept HBV JAVIER positive livers  I was present at the committee meeting and attest to the decision of the committee.    Hal Oconnell  11/07/2018  I was present at the committee meeting and attest to the decision of the committee.    Hal Oconnell  11/07/2018

## 2018-11-07 NOTE — SUBJECTIVE & OBJECTIVE
Subjective:     Principal Problem:Pre-transplant evaluation for liver transplant    Interval History: Patient out of room. Wife reports that patient's rash is the same this morning. Improved symptoms with topical steroids. Patient to go for thoracentesis today.     Medications:  Continuous Infusions:  Scheduled Meds:   fluconazole  100 mg Oral Daily    folic acid  1 mg Oral Daily    hepatitis A virus vaccine (PF)  1,440 Units Intramuscular Once    lactulose  10 g Oral BID    lidocaine  1 patch Transdermal Q24H    lidocaine  1 patch Transdermal Q24H    midodrine  15 mg Oral TID    multivitamin  1 tablet Oral Daily    pantoprazole  40 mg Oral Daily    rifAXImin  550 mg Oral BID    sevelamer carbonate  800 mg Oral TID WM    sodium bicarbonate  1,300 mg Oral TID    thiamine  100 mg Oral Daily    triamcinolone acetonide 0.1%   Topical (Top) BID     PRN Meds:sodium chloride, acetaminophen, albumin human 25%, albuterol-ipratropium, dextrose 50%, dextrose 50%, glucagon (human recombinant), glucose, glucose, hepatitis B virus vacc.rec(PF), hydrOXYzine HCl, insulin aspart U-100, ondansetron, promethazine (PHENERGAN) IVPB, promethazine (PHENERGAN) IVPB, ramelteon, sodium chloride 0.9%, sodium chloride 0.9%, traMADol    Review of Systems  Objective:     Vital Signs (Most Recent):  Temp: 97.7 °F (36.5 °C) (11/07/18 0915)  Pulse: 81 (11/07/18 0915)  Resp: 18 (11/07/18 0915)  BP: (!) 91/53 (11/07/18 0915)  SpO2: 98 % (11/07/18 0915) Vital Signs (24h Range):  Temp:  [37.4 °F (3 °C)-98.8 °F (37.1 °C)] 97.7 °F (36.5 °C)  Pulse:  [76-85] 81  Resp:  [15-18] 18  SpO2:  [94 %-99 %] 98 %  BP: ()/(50-57) 91/53     Weight: 88.5 kg (195 lb 1.7 oz)  Body mass index is 27.99 kg/m².    Physical Exam     Significant Labs:   CBC:   Recent Labs   Lab 11/06/18  0525 11/07/18  0631   WBC 15.34* 14.90*   HGB 10.4* 10.9*   HCT 31.3* 32.8*   PLT 61* 84*       Significant Imaging: I have reviewed all pertinent imaging  results/findings within the past 24 hours.

## 2018-11-07 NOTE — PROCEDURES
Radiology Post-Procedure Note    Pre Op Diagnosis: Right pleural effusion  Post Op Diagnosis: Same    Procedure: Thoracentesis    Procedure performed by: Dmitri Lopez MD    Written Informed Consent Obtained: Yes  Specimen Removed:  mL serous fluid  Estimated Blood Loss: Minimal    Findings:   Using US guidance, a 5F yueh centesis needle was advanced into the pleural space.  500 mL serous fluid was removed.  No immediate complications.    Patient tolerated procedure well.    Dmitri Lopez MD  Diagnostic and Interventional Radiologist  Department of Radiology  Pager: [phone]

## 2018-11-07 NOTE — PT/OT/SLP PROGRESS
Physical Therapy      Patient Name:  Femi Enciso   MRN:  [id number]    Patient not seen today secondary to pt about to receive plasma treatment with nursing and then away at procedure.   Will follow-up next scheduled treatment per PT POC    Petar Headley, PTA  11/7/2018

## 2018-11-07 NOTE — PROGRESS NOTES
"  [facility]  Adult Nutrition  Consult Note    SUMMARY     Recommendations    1. When medically feasible, resume renal diet + Novasource ONS. Encourage adequate PO intake as tolerated.   2. RD to monitor & follow-up.    Goals: PO intake >50%  Nutrition Goal Status: progressing towards goal  Communication of RD Recs: reviewed with RN    Reason for Assessment    Reason for Assessment: RD follow-up  Diagnosis: other (see comments)(Liver fx; liver tx work-up)  Relevant Medical History: Cirrhosis, Etoh abuse  Interdisciplinary Rounds: did not attend    General Information Comments: Pt OPAL & NPO at time of visit. Prior to NPO status, pt w/ fair appetite, consuming 25-50% of meals. ONS ordered. Pt on HD. Thoracentesis this AM. NFPE completed on 10/31- exam remains unchanged. Pt with moderate malnutrition.   Nutrition Discharge Planning: Adequate PO intake    Nutrition/Diet History    Patient Reported Diet/Restrictions/Preferences: general  Do you have any cultural, spiritual, Oriental orthodox conflicts, given your current situation?: none stated  Factors Affecting Nutritional Intake: NPO, decreased appetite    Anthropometrics    Temp: 97.7 °F (36.5 °C)  Height Method: Stated  Height: 5' 10" (177.8 cm)  Height (inches): 70 in  Weight Method: Standard Scale  Weight: 88.5 kg (195 lb 1.7 oz)  Weight (lb): 195.11 lb  Ideal Body Weight (IBW), Male: 166 lb  % Ideal Body Weight, Male (lb): 117.54 lb  BMI (Calculated): 28.1  BMI Grade: 25 - 29.9 - overweight  Usual Body Weight (UBW), k.5 kg  % Usual Body Weight: 86.67  % Weight Change From Usual Weight: -13.51 %    Lab/Procedures/Meds    Pertinent Labs Reviewed: reviewed  Pertinent Labs Comments: BUN 76, Creat 8.7, GFR 7.2, P 6.7  Pertinent Medications Reviewed: reviewed  Pertinent Medications Comments: Lactulose    Physical Findings/Assessment    Overall Physical Appearance: loss of muscle mass, loss of subcutaneous fat  Oral/Mouth Cavity: WDL  Skin: jaundice, " edema    Estimated/Assessed Needs    Weight Used For Calorie Calculations: 83.7 kg (184 lb 8.4 oz)(Dosing wt)     Energy Calorie Requirements (kcal): 2110 kcal/d  Energy Need Method: [place]-St Jeor(1.25 PAL)     Protein Requirements: 109 g/d (1.3 g/kg)  Weight Used For Protein Calculations: 83.7 kg (184 lb 8.4 oz)     Fluid Need Method: other (see comments)(Per MD or 1 mL/kcal)    Nutrition Prescription Ordered    Current Diet Order: NPO    Evaluation of Received Nutrient/Fluid Intake    Comments: LBM: 11/6    Nutrition Risk    Level of Risk/Frequency of Follow-up: (1x/week)     Assessment and Plan        Moderate protein malnutrition     Malnutrition in the context of Acute Illness/Injury     Related to (etiology):  Cirrhosis, poor appetite     Signs and Symptoms (as evidenced by):  Energy Intake: <75% of estimated energy requirement for 2 months  Body Fat Depletion: moderate depletion of orbitals and triceps   Muscle Mass Depletion: moderate depletion of temples, clavicle region and scapular region   Weight Loss: 14% x 2 months     Nutrition Diagnosis Status:  Continues     Monitor and Evaluation    Food and Nutrient Intake: energy intake, food and beverage intake  Food and Nutrient Adminstration: diet order  Physical Activity and Function: nutrition-related ADLs and IADLs  Anthropometric Measurements: weight, weight change  Biochemical Data, Medical Tests and Procedures: lipid profile, inflammatory profile, glucose/endocrine profile, gastrointestinal profile, electrolyte and renal panel  Nutrition-Focused Physical Findings: overall appearance     Nutrition Follow-Up    RD Follow-up?: Yes

## 2018-11-07 NOTE — PROCEDURES
Procedure Date: 11/7/18    (s) and Role:   Brock Gonzalez MD    Indication: HD access     Pre-op Diagnosis:    DEL requiring HD    Post-op Diagnosis;  DEL requiring HD    Procedure: Insertion Tunneled HD Catheter with Fluoro     Anesthesia: Local     Findings/Key Components:   Catheter placed in RA. Good flush and draw through each port.  Estimated Blood Loss: 5mL     Specimens     None         PROCEDURE: After obtaining consent, the patient was taken to the ACE suite. Sedation was administered. The right neck and chest were prepped and draped in usual sterile fashion. We began using ultrasound guidance to examine the right internal jugular vein. It appeared to be widely patent and was free of thrombus that appeared suitable for access for HD catheter. We accessed the internal jugular vein using a Seldinger technique with an micropunture needle without difficulty, then the thin wire was passed into the superior vena cava through the heart into the inferior vena cava. The wire position was confirmed with intraoperative fluoroscopy, then a 5Fr sheath was introduced over the wire. The wire was removed and the the guidewire was passed into the IVC under fluoroscopic guidance. Counterincision was made at the venotomy and on the chest wall just below the clavicle. Local anesthesia was used to anesthetize the track and a tunneler was used to pass the 23cm GlidePath catheter underneath the chest wall until the felt cuff was just underneath the counter incision. The 5fr sheath was removed and the vein was dilated using serial dilators. Then the large peel-away sheath was then inserted over the guidewire under fluoroscopic guidance. The dilator and wire were removed. The catheter was placed through the peel-away sheath and positioned appropriately at center of RA. Fluoroscopy was used to confirm that the catheter tip was in appropriate position and that there was no kinking at the apex of the catheter. A  permanent recording of the catheter position was also taken with fluoroscopy. Both lumens had excellent draw and flush. The catheter was then secured in place with 3-0 Prolene. The counterincision in the neck was closed with a 3-0 Vicryl. There were no immediate complications. Patient tolerated the procedure well and discharged in stable condition.     Anesthesia:  No IV sedation 2/2 low BP. Local anesthesia was achieved with 20 ml of Lidocaine 2%. Moderate conscious sedation was performed and cardiorespiratory functions were monitored the entire procedure by me and RN.

## 2018-11-07 NOTE — PROGRESS NOTES
[facility]  Nephrology  Progress Note     Patient Name: Femi Enciso  MRN: [id number]  Admission Date: 10/27/2018  Hospital Length of Stay: 9 days  Attending Provider: Dante Cash MD   Primary Care Physician: Primary Doctor No  Principal Problem:Pre-transplant evaluation for liver transplant     Subjective:      HPI: 54 y/o man with DM2 presents to the ED with family for liver failure (likely due to EtOH abundant history of drinking - diagnosed in Sept 2018 in [place]).  He reports jaundice, generalized weakness, nausea, diarrhea, and decreased appetite since Sept 2018.  He is from [place], [place], and Dr. Sharma (patients physician) recommended bringing him to hospital for evaluation.  Patient denies any fever, chills, vomiting, chest pain, palpitations, SOB, abdominal pain.       Nephrology consulted for evaluation/management Adri.      Interval History:   LAUREN, s/p thoracentesis today. ACE consulted for permacath placement for ongoing HD and temporary HD cathete malfunction.           Review of patient's allergies indicates:   Allergen Reactions    Penicillins Nausea And Vomiting and Rash           Current Facility-Administered Medications   Medication Frequency    0.9%  NaCl infusion PRN    0.9%  NaCl infusion PRN    acetaminophen tablet 650 mg Q4H PRN    albumin human 25% bottle 25 g PRN    albuterol-ipratropium 2.5 mg-0.5 mg/3 mL nebulizer solution 3 mL Q6H PRN    dextrose 50% injection 12.5 g PRN    dextrose 50% injection 25 g PRN    fluconazole tablet 100 mg Daily    folic acid tablet 1 mg Daily    glucagon (human recombinant) injection 1 mg PRN    glucose chewable tablet 16 g PRN    glucose chewable tablet 24 g PRN    insulin aspart U-100 pen 0-5 Units QID (AC + HS) PRN    lactulose 20 gram/30 mL solution Soln 10 g BID    lidocaine 5 % patch 1 patch Q24H    lidocaine 5 % patch 1 patch Q24H    midodrine tablet 15 mg TID    multivitamin tablet 1 tablet Daily    ondansetron  disintegrating tablet 8 mg Q6H PRN    pantoprazole EC tablet 40 mg Daily    promethazine (PHENERGAN) 12.5 mg in dextrose 5 % 50 mL IVPB Q6H PRN    promethazine (PHENERGAN) 6.25 mg in dextrose 5 % 50 mL IVPB Q6H PRN    ramelteon tablet 8 mg Nightly PRN    rifAXIMin tablet 550 mg BID    sevelamer carbonate tablet 800 mg TID WM    sodium bicarbonate tablet 1,300 mg TID    sodium chloride 0.9% flush 5 mL PRN    thiamine tablet 100 mg Daily    traMADol tablet 50 mg Q8H PRN         Objective:      Vital Signs (Most Recent):  Temp: (!) 95.6 °F (35.3 °C) (11/05/18 1432)  Pulse: 81 (11/05/18 1507)  Resp: 18 (11/05/18 1507)  BP: (!) 93/54 (11/05/18 1432)  SpO2: 100 % (11/05/18 1507)  O2 Device (Oxygen Therapy): room air (11/05/18 1507) Vital Signs (24h Range):  Temp:  [95.6 °F (35.3 °C)-98.7 °F (37.1 °C)] 95.6 °F (35.3 °C)  Pulse:  [75-94] 81  Resp:  [16-20] 18  SpO2:  [92 %-100 %] 100 %  BP: ()/(47-61) 93/54      Weight: 82.6 kg (182 lb 3.4 oz) (11/05/18 0400)  Body mass index is 26.14 kg/m².  Body surface area is 2.02 meters squared.     I/O last 3 completed shifts:  In: 2160 [P.O.:2060; IV Piggyback:100]  Out: 200 [Urine:200]     Physical Exam   Constitutional: He appears well-developed. He appears cachectic. He is cooperative. No distress.   Temporal musc wasting   HENT:   Head: Normocephalic and atraumatic.   Eyes: Conjunctivae are normal. Pupils are equal, round, and reactive to light.   Neck: Trachea normal and normal range of motion. Neck supple. No JVD present.   Cardiovascular: Normal rate, regular rhythm, S1 normal, S2 normal and normal pulses. Exam reveals no gallop and no friction rub.   No murmur heard.  Pulmonary/Chest: Effort normal. He has decreased breath sounds in the right middle field, the right lower field and the left lower field. He has no rhonchi.   Abdominal: Soft. Bowel sounds are normal. He exhibits distension and ascites. There is no tenderness.   Musculoskeletal: Normal range of  motion. He exhibits no edema.   Neurological: He is alert.   Skin: Skin is warm and dry. Capillary refill takes less than 2 seconds.   Psychiatric: He has a normal mood and affect. His behavior is normal.   Vitals reviewed.        Significant Labs:  ABGs: No results for input(s): PH, PCO2, HCO3, POCSATURATED, BE in the last 168 hours.  BMP:       Recent Labs   Lab 11/05/18  0350      CL 99   CO2 21*   BUN 77*   CREATININE 8.1*   CALCIUM 8.3*   MG 2.1      Cardiac Markers: No results for input(s): CKMB, TROPONINT, MYOGLOBIN in the last 168 hours.  CBC:       Recent Labs   Lab 11/05/18 0350   WBC 15.97*   RBC 3.08*   HGB 10.2*   HCT 30.0*   PLT 68*   MCV 97   MCH 33.1*   MCHC 34.0      CMP:       Recent Labs   Lab 11/05/18 0350      CALCIUM 8.3*   ALBUMIN 2.2*   PROT 4.9*   *   K 4.7   CO2 21*   CL 99   BUN 77*   CREATININE 8.1*   ALKPHOS 213*   ALT 32   AST 69*   BILITOT 35.9*      Coagulation:       Recent Labs   Lab 11/05/18 0350   INR 2.1*      No results for input(s): COLORU, CLARITYU, SPECGRAV, PHUR, PROTEINUA, GLUCOSEU, BILIRUBINCON, BLOODU, WBCU, RBCU, BACTERIA, MUCUS, NITRITE, LEUKOCYTESUR, UROBILINOGEN, HYALINECASTS in the last 168 hours.  All labs within the past 24 hours have been reviewed.      Significant Imaging:  Labs: Reviewed  US: Reviewed     Assessment/Plan:          DEL (acute kidney injury)       Will place tunneled HD catheter for ongoing HD.

## 2018-11-07 NOTE — PLAN OF CARE
Problem: Patient Care Overview  Goal: Plan of Care Review  POC discussed with Pt.  Verbalized that he was anxious to know about outcome of his transplant status. VSS. Afebrile, no falls. No c/o pain.  Stated he had some nausea but declined any medications.

## 2018-11-07 NOTE — SUBJECTIVE & OBJECTIVE
Interval History:   NAEON, Events over the weekend noted. Seen today while on HD in [place] tolerating well new cather with 400 ml/min. Got his perm-cath. MS stable alert and awake Oriented x 3. He is hemodynamically stable. Remains anuric. Oxygenation stable RA. WEight gain ~ 3kg since last treatment BP lows  Review of patient's allergies indicates:   Allergen Reactions    Penicillins Nausea And Vomiting and Rash     Current Facility-Administered Medications   Medication Frequency    0.9%  NaCl infusion (for blood administration) Q24H PRN    0.9%  NaCl infusion PRN    0.9%  NaCl infusion Once    acetaminophen tablet 650 mg Q4H PRN    albumin human 25% bottle 25 g PRN    albuterol-ipratropium 2.5 mg-0.5 mg/3 mL nebulizer solution 3 mL Q6H PRN    dextrose 50% injection 12.5 g PRN    dextrose 50% injection 25 g PRN    fentaNYL injection PRN    fluconazole tablet 100 mg Daily    folic acid tablet 1 mg Daily    glucagon (human recombinant) injection 1 mg PRN    glucose chewable tablet 16 g PRN    glucose chewable tablet 24 g PRN    hepatitis B virus vacc.rec(PF) injection 40 mcg vaccine x 1 dose    hydrOXYzine HCl tablet 25 mg TID PRN    insulin aspart U-100 pen 0-5 Units QID (AC + HS) PRN    lactulose 20 gram/30 mL solution Soln 10 g BID    lidocaine (PF) 20 mg/ml (2%) injection PRN    lidocaine 5 % patch 1 patch Q24H    lidocaine 5 % patch 1 patch Q24H    midazolam (PF) injection PRN    midodrine tablet 15 mg TID    multivitamin tablet 1 tablet Daily    ondansetron disintegrating tablet 8 mg Q6H PRN    pantoprazole EC tablet 40 mg Daily    promethazine (PHENERGAN) 12.5 mg in dextrose 5 % 50 mL IVPB Q6H PRN    promethazine (PHENERGAN) 6.25 mg in dextrose 5 % 50 mL IVPB Q6H PRN    ramelteon tablet 8 mg Nightly PRN    rifAXIMin tablet 550 mg BID    sevelamer carbonate tablet 800 mg TID WM    sodium bicarbonate tablet 1,300 mg TID    sodium chloride 0.9% flush 3 mL PRN    sodium chloride  0.9% flush 5 mL PRN    thiamine tablet 100 mg Daily    traMADol tablet 50 mg Q8H PRN    triamcinolone acetonide 0.1% cream BID       Objective:     Vital Signs (Most Recent):  Temp: 97.7 °F (36.5 °C) (11/07/18 1505)  Pulse: 79 (11/07/18 1600)  Resp: 16 (11/07/18 1505)  BP: (!) 96/51 (11/07/18 1600)  SpO2: 97 % (11/07/18 1315)  O2 Device (Oxygen Therapy): room air (11/07/18 1505) Vital Signs (24h Range):  Temp:  [96.4 °F (35.8 °C)-97.9 °F (36.6 °C)] 97.7 °F (36.5 °C)  Pulse:  [73-85] 79  Resp:  [16-18] 16  SpO2:  [92 %-98 %] 97 %  BP: ()/(51-61) 96/51     Weight: 88.5 kg (195 lb 1.7 oz) (11/07/18 1315)  Body mass index is 27.99 kg/m².  Body surface area is 2.09 meters squared.    I/O last 3 completed shifts:  In: 1380 [P.O.:1280; I.V.:100]  Out: -     Physical Exam   Constitutional: He appears well-developed. He appears cachectic. He is cooperative. No distress.   Temporal musc wasting   HENT:   Head: Normocephalic and atraumatic.   Eyes: Conjunctivae are normal. Pupils are equal, round, and reactive to light.   Neck: Trachea normal and normal range of motion. Neck supple. No JVD present.   Cardiovascular: Normal rate, regular rhythm, S1 normal, S2 normal and normal pulses. Exam reveals no gallop and no friction rub.   No murmur heard.  Pulmonary/Chest: Effort normal. He has decreased breath sounds in the right middle field, the right lower field and the left lower field. He has no rhonchi.   Abdominal: Soft. Bowel sounds are normal. He exhibits distension and ascites. There is no tenderness.   Musculoskeletal: Normal range of motion. He exhibits no edema.   Neurological: He is alert.   Skin: Skin is warm and dry. Capillary refill takes less than 2 seconds.   Psychiatric: He has a normal mood and affect. His behavior is normal.   Vitals reviewed.      Significant Labs:  ABGs: No results for input(s): PH, PCO2, HCO3, POCSATURATED, BE in the last 168 hours.  BMP:   Recent Labs   Lab 11/07/18  0631   GLU 81    CL 98   CO2 25   BUN 76*   CREATININE 8.7*   CALCIUM 8.8   MG 2.1     Cardiac Markers: No results for input(s): CKMB, TROPONINT, MYOGLOBIN in the last 168 hours.  CBC:   Recent Labs   Lab 11/07/18  0631   WBC 14.90*   RBC 3.25*   HGB 10.9*   HCT 32.8*   PLT 84*   *   MCH 33.5*   MCHC 33.2     CMP:   Recent Labs   Lab 11/07/18  0631   GLU 81   CALCIUM 8.8   ALBUMIN 2.3*   PROT 5.2*      K 5.3*   CO2 25   CL 98   BUN 76*   CREATININE 8.7*   ALKPHOS 227*   ALT 28   AST 63*   BILITOT 37.4*     Coagulation:   Recent Labs   Lab 11/07/18  0631   INR 2.1*     No results for input(s): COLORU, CLARITYU, SPECGRAV, PHUR, PROTEINUA, GLUCOSEU, BILIRUBINCON, BLOODU, WBCU, RBCU, BACTERIA, MUCUS, NITRITE, LEUKOCYTESUR, UROBILINOGEN, HYALINECASTS in the last 168 hours.  All labs within the past 24 hours have been reviewed.     Significant Imaging:  Labs: Reviewed  US: Reviewed

## 2018-11-07 NOTE — PROGRESS NOTES
Thoracentesis completed, pt tolerated well. No apparent distress noted. 500ml remove, dressing applied CDI. Labs collected and sent. Pt awaiting CXR. Report called to primary nurse. Pt to be transferred to [facility] after CXR complete.

## 2018-11-07 NOTE — PROGRESS NOTES
[facility]  Dermatology  Progress Note    Patient Name: Femi Enciso  MRN: [id number]  Admission Date: 10/27/2018  Hospital Length of Stay: 11 days  Attending Physician: Hai Wylie MD  Primary Care Provider: Primary Doctor No     Subjective:     Principal Problem:Pre-transplant evaluation for liver transplant    Interval History: Patient out of room. Wife reports that patient's rash is the same this morning. Improved symptoms with topical steroids. Patient to go for thoracentesis today.     Medications:  Continuous Infusions:  Scheduled Meds:   fluconazole  100 mg Oral Daily    folic acid  1 mg Oral Daily    hepatitis A virus vaccine (PF)  1,440 Units Intramuscular Once    lactulose  10 g Oral BID    lidocaine  1 patch Transdermal Q24H    lidocaine  1 patch Transdermal Q24H    midodrine  15 mg Oral TID    multivitamin  1 tablet Oral Daily    pantoprazole  40 mg Oral Daily    rifAXImin  550 mg Oral BID    sevelamer carbonate  800 mg Oral TID WM    sodium bicarbonate  1,300 mg Oral TID    thiamine  100 mg Oral Daily    triamcinolone acetonide 0.1%   Topical (Top) BID     PRN Meds:sodium chloride, acetaminophen, albumin human 25%, albuterol-ipratropium, dextrose 50%, dextrose 50%, glucagon (human recombinant), glucose, glucose, hepatitis B virus vacc.rec(PF), hydrOXYzine HCl, insulin aspart U-100, ondansetron, promethazine (PHENERGAN) IVPB, promethazine (PHENERGAN) IVPB, ramelteon, sodium chloride 0.9%, sodium chloride 0.9%, traMADol    Review of Systems  Objective:     Vital Signs (Most Recent):  Temp: 97.7 °F (36.5 °C) (11/07/18 0915)  Pulse: 81 (11/07/18 0915)  Resp: 18 (11/07/18 0915)  BP: (!) 91/53 (11/07/18 0915)  SpO2: 98 % (11/07/18 0915) Vital Signs (24h Range):  Temp:  [37.4 °F (3 °C)-98.8 °F (37.1 °C)] 97.7 °F (36.5 °C)  Pulse:  [76-85] 81  Resp:  [15-18] 18  SpO2:  [94 %-99 %] 98 %  BP: ()/(50-57) 91/53     Weight: 88.5 kg (195 lb 1.7 oz)  Body mass index is 27.99  kg/m².    Physical Exam     Significant Labs:   CBC:   Recent Labs   Lab 11/06/18  0525 11/07/18  0631   WBC 15.34* 14.90*   HGB 10.4* 10.9*   HCT 31.3* 32.8*   PLT 61* 84*       Significant Imaging: I have reviewed all pertinent imaging results/findings within the past 24 hours.    Assessment/Plan:     Acute maculopapular rash    Femi Enciso is a 53 y.o. male with alcoholic cirrhosis admitted for liver transplantation evaluation. Hospital course complicated by hepatorenal syndrome requiring dialysis and sepsis believed secondary to UTI. Current infectious work-up today otherwise unremarkable. Now with a ~1 week hx of ill-defined, evanescent and erythematous extremity rash. Dermatology consulted for evaluation.     Evanescent and erythematous macular rash: favor drug induced rash due to cefepime. Patient with possible hx of penicillin allergy with recent 7 day course of cefepime (cross reacts w/ penicillins) with development of intermittent, itchy red rash. Drug rashes tend to occur 5-7 days after initiation of antibiotics. However, given patient's continued leukocytosis (possible incompletely treated UTI?) we cannot rule out a more concerning infectious process (TSS from strep or staph). Lack of scarlatiniform rash, high grade fever, palmar involvement or desquamation makes this less likely. Little concern for bullous drug eruption (TEN/SJS unlikely given lack of bulla), DRESS (no eosinophilia, no angioedema).   - Ceftriaxone dc'ed.   - Continue TAC 0.1% cream; apply to the affected sites BID.   - Continue hydroxyzine 25 mg po q8h prn; watch for increasing somnolence.   - Biopsy unlikely to reveal etiology.   - Rash should not preclude patient from transplantation but in light of continued leukocytosis, can not rule out rash as manifestation of underlying infection. If this were the case, current drug regimen would adequately treat.   - If rash worsens (increased spread, blister development, oral involvement, etc.),  please contact us.               Thank you for your consult. I will sign off. Please contact us if you have any additional questions.    Tessa Whelan MD  Dermatology  [facility]

## 2018-11-07 NOTE — PT/OT/SLP PROGRESS
Occupational Therapy      Patient Name:  Femi Enciso   MRN:  [id number]    Patient not seen today secondary to Dialysis, Other (Comment)(Baljinder-Cath placement). Will follow-up 11/8/2018.    Sukhdev Li, OT  11/7/2018

## 2018-11-07 NOTE — ASSESSMENT & PLAN NOTE
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   Plan:  - No evidence of renal recovery remains anuric.  - HD x 3.5 hrs today for metabolic clearance and volume management seen while on HD new catheter with  ml/min  - UF ~ 1-2 lts as tolerated  - Remains anuric  - UCx  Enterococcus Cloacae  > 100 K on Cefepime which is sensitive treatment should compete as per primary team  - UPCR low 0.26 g/g  - Strict I/O and chart  - Avoid nephrotoxic medications  - Maintain MAP >65 please continue midodrine  - please keep Hb > 7 gm/dL  - Medication doses adjusted to GFR  - Phos 6.7 Renvela 800 mg with AC   - Renal diet

## 2018-11-07 NOTE — PROGRESS NOTES
Progress Note   Hospital Medicine         Patient Name: Femi Enciso  MRN:  [id number]  [facility] Medicine Team: [facility] Hai Wylie MD  Date of Admission:  10/27/2018     Length of Stay:  LOS: 11 days   Expected Discharge Date: 11/13/2018  Principal Problem:  Pre-transplant evaluation for liver transplant       Subjective:     Interval History/Overnight Events:  Patient doing well today; went for permacath placement; went for thoracocentesis with 500 cc of fluid removed and negative for infection     - patient approved for liver transplant; awaiting financial approval for listing;     Review of Systems   Constitutional: Negative for chills, fatigue, fever.   HENT: Negative for sore throat, trouble swallowing.    Eyes: Negative for photophobia, visual disturbance.   Respiratory: Negative for cough, shortness of breath.    Cardiovascular: Negative for chest pain, palpitations, leg swelling.   Gastrointestinal: Negative for abdominal pain, constipation, diarrhea, nausea, vomiting.   Endocrine: Negative for cold intolerance, heat intolerance.   Genitourinary: Negative for dysuria, frequency.   Musculoskeletal: Negative for arthralgias, myalgias.   Skin: Negative for rash, wound, erythema   Neurological: Negative for dizziness, syncope, weakness, light-headedness.   Psychiatric/Behavioral: Negative for confusion, hallucinations, anxiety  All other systems reviewed and are negative.    Objective:     Temp:  [96.4 °F (35.8 °C)-97.9 °F (36.6 °C)]   Pulse:  [73-85]   Resp:  [15-18]   BP: ()/(51-61)   SpO2:  [92 %-98 %]       Physical Exam:  Constitutional: Appears well-developed and well-nourished.   Head: Normocephalic and atraumatic.   Mouth/Throat: Oropharynx is clear and moist.   Eyes: EOM are normal. Pupils are equal, round, and reactive to light. No scleral icterus.   Neck: Normal range of motion. Neck supple.   Cardiovascular: Normal rate and regular rhythm.  No murmur heard.  Pulmonary/Chest: Effort  normal and breath sounds normal. No respiratory distress. No wheezes, rales, or rhonchi  Abdominal: Soft. Bowel sounds are normal.  No distension or tenderness  Musculoskeletal: Normal range of motion. No edema.   Neurological: Alert and oriented to person, place, and time.   Skin: Skin is warm and dry.   Psychiatric: Normal mood and affect. Behavior is normal.     Recent Labs   Lab 11/02/18  0452 11/03/18  0513 11/04/18  0424 11/05/18  0350 11/06/18  0525 11/07/18  0631   WBC 13.20* 10.34 11.75 15.97* 15.34* 14.90*   HGB 11.0* 10.3* 10.4* 10.2* 10.4* 10.9*   HCT 31.8* 30.8* 30.6* 30.0* 31.3* 32.8*   PLT 59* 50* 52* 68* 61* 84*     Recent Labs   Lab 11/05/18  0350 11/06/18  0525 11/07/18  0631   * 139 138   K 4.7 5.2* 5.3*   CL 99 100 98   CO2 21* 23 25   BUN 77* 61* 76*   CREATININE 8.1* 7.2* 8.7*    83 81   CALCIUM 8.3* 8.5* 8.8   MG 2.1 2.1 2.1   PHOS 5.2* 5.3* 6.7*     Recent Labs   Lab 11/05/18  0350 11/06/18  0525 11/07/18  0631   ALKPHOS 213* 206* 227*   ALT 32 29 28   AST 69* 64* 63*   ALBUMIN 2.2* 2.5* 2.3*   PROT 4.9* 5.2* 5.2*   BILITOT 35.9* 36.2* 37.4*   INR 2.1* 2.3* 2.1*     Recent Labs   Lab 11/06/18  1027 11/06/18  1145 11/06/18  1742 11/06/18  2229 11/07/18  0918 11/07/18  1128   POCTGLUCOSE 77 76 119* 139* 79 81        sodium chloride 0.9%   Intravenous Once    fluconazole  100 mg Oral Daily    folic acid  1 mg Oral Daily    lactulose  10 g Oral BID    lidocaine  1 patch Transdermal Q24H    lidocaine  1 patch Transdermal Q24H    midodrine  15 mg Oral TID    multivitamin  1 tablet Oral Daily    pantoprazole  40 mg Oral Daily    rifAXImin  550 mg Oral BID    sevelamer carbonate  800 mg Oral TID WM    sodium bicarbonate  1,300 mg Oral TID    thiamine  100 mg Oral Daily    triamcinolone acetonide 0.1%   Topical (Top) BID       Assessment and Plan     Mr. Femi Enciso is a 53 y.o. male who presented to [facility] on 10/27/2018 with     Hospital Course:    Mr. Femi Enciso was  admitted to Hospital Medicine for management of     Active Hospital Problems    Diagnosis  POA    *Pre-transplant evaluation for liver transplant [Z01.818]  Not Applicable    Encounter for pre-transplant evaluation for kidney transplant [Z01.818]  Not Applicable    Acute maculopapular rash [R21]  Yes    Alcohol use disorder, severe, in early remission [F10.21]  Yes    Decompensated hepatic cirrhosis [K72.90]  Yes    Acute liver failure without hepatic coma [K72.00]  Yes    Alcoholic hepatitis with ascites [K70.11]  Yes    Acute liver failure [K72.00]  Yes    Jaundice [R17]  Yes    Hepatorenal syndrome [K76.7]  Yes    DEL (acute kidney injury) [N17.9]  Yes     Chronic    Acute kidney failure with lesion of tubular necrosis [N17.0]  Yes    Severe alcohol dependence [F10.20]  Yes    Coagulopathy [D68.9]  Yes    Anemia of chronic disease [D63.8]  Yes    Thrombocytopenia [D69.6]  Yes    Hyponatremia [E87.1]  Yes    Hepatic encephalopathy [K72.90]  Yes    Metabolic acidosis [E87.2]  Yes    Moderate protein malnutrition [E44.0]  Yes    Type 2 diabetes mellitus without complication [E11.9]  Yes    Acute cystitis without hematuria [N30.00]  Yes    Pleural effusion associated with hepatic disorder [K76.9, J91.8]  Yes      Resolved Hospital Problems    Diagnosis Date Resolved POA    Chronic kidney disease-mineral and bone disorder [N18.9, E83.9, M89.9] 11/02/2018 Yes    Sepsis [A41.9] 10/29/2018 Yes           Severe alcohol dependence     - Last drink on 09/21/2018  - Unlikely to have DTs  - Continue to monitor   Renal/   DEL (acute kidney injury)  ATN     - nephrology consulted; HD per them  -      ID   Sepsis     - rule out;        Hematology   Thrombocytopenia     - Likely due to liver dz  - no sign of bleeding  - Continue to monitor      Coagulopathy     - INR 2.3    Oncology   Anemia of chronic disease     - h/h stable , continue to monitor      Endocrine   Type 2 diabetes mellitus without  complication     - SSI  - Hold metformin at home      GI   * Acute liver failure without hepatic coma  Alcohol cirrhosis with ascites  Esophageal varices non bleeding      MELD-Na score: 42 at 11/7/2018  6:31 AM  MELD score: 42 at 11/7/2018  6:31 AM  Calculated from:  Serum Creatinine: 8.7 mg/dL (Rounded to 4 mg/dL) at 11/7/2018  6:31 AM  Serum Sodium: 138 mmol/L (Rounded to 137 mmol/L) at 11/7/2018  6:31 AM  Total Bilirubin: 37.4 mg/dL at 11/7/2018  6:31 AM  INR(ratio): 2.1 at 11/7/2018  6:31 AM  Age: 53 years   - hepatology consulted  - under going liver tx evaluation; planing on presentation tomorrow       Hepatic encephalopathy     - Continue lactulose and rifaximine  - Monitor BM accordingly      Pleural effusion associated with hepatic disorder     - CXR much improved s/p drainage  - Chest tube removed on 10/31/2018  - IR thoracentesis 11/7- 500 cc removed and negative for infectio  -on RA   Hepatorenal syndrome     - Continue midodrine, albumin  - Maintain MAP>65      Decompensated hepatic cirrhosis     - see above             High Risk Conditions:  Liver failure, renal failure    Disposition:  Patient accepted for liver transplant, pending listing; too sick too leave prior to liver tx    Hai Wylie MD  Medical Director [facility]  Spectra:  [phone]  Pager: [phone]

## 2018-11-07 NOTE — PROGRESS NOTES
Pt arrived to IR room 189 for thoracentesis, no acute distress noted. Orders, consent and labs reviewed on chart.

## 2018-11-07 NOTE — H&P
Radiology History & Physical      SUBJECTIVE:     History of Present Illness:  Femi Enciso is a 53 y.o. male with history of alcoholic cirrhosis who presents for thoracentesis.    Past Medical History:   Diagnosis Date    Liver cirrhosis, alcoholic 09/30/2018     History reviewed. No pertinent surgical history.    Home Meds:   Prior to Admission medications    Medication Sig Start Date End Date Taking? Authorizing Provider   calcium carbonate/vitamin D3 (VITAMIN D-3 ORAL) Take 1 tablet by mouth once daily.   Yes Historical Provider, MD   cyanocobalamin (VITAMIN B-12) 100 MCG tablet Take 100 mcg by mouth once daily.   Yes Historical Provider, MD   folic acid (FOLVITE) 1 MG tablet Take 1 mg by mouth once daily.   Yes Historical Provider, MD   lactulose (CHRONULAC) 10 gram/15 mL solution Take 20 g by mouth 3 (three) times daily.   Yes Historical Provider, MD   multivitamin (THERAGRAN) per tablet Take 1 tablet by mouth once daily.   Yes Historical Provider, MD   omeprazole (PRILOSEC) 40 MG capsule Take 40 mg by mouth once daily.   Yes Historical Provider, MD   ondansetron (ZOFRAN) 4 MG tablet Take 4 mg by mouth daily as needed for Nausea.   Yes Historical Provider, MD   rifAXIMin (XIFAXAN) 550 mg Tab Take 550 mg by mouth 2 (two) times daily.   Yes Historical Provider, MD       Allergies:   Review of patient's allergies indicates:   Allergen Reactions    Penicillins Nausea And Vomiting and Rash     Tolerated cefepime 10/27/18     Sedation History:  no adverse reactions    Review of Systems:   Hematological: no known coagulopathies  Respiratory: no shortness of breath  Cardiovascular: no chest pain  Gastrointestinal: no abdominal pain  Genito-Urinary: no dysuria  Musculoskeletal: negative  Neurological: no TIA or stroke symptoms         OBJECTIVE:     Vital Signs (Most Recent)  Temp: 97.8 °F (36.6 °C) (11/07/18 0416)  Pulse: 85 (11/07/18 0416)  Resp: 18 (11/07/18 0416)  BP: (!) 109/52 (11/07/18 0416)  SpO2: (!) 94 %  (11/07/18 0416)    Physical Exam:  ASA: 3  Mallampati: 2    General: no acute distress  Mental Status: alert and oriented to person, place and time  HEENT: normocephalic, atraumatic  Chest: unlabored breathing  Heart: regular heart rate  Abdomen: nondistended  Extremity: moves all extremities    Laboratory  Lab Results   Component Value Date    INR 2.1 (H) 11/07/2018       Lab Results   Component Value Date    WBC 14.90 (H) 11/07/2018    HGB 10.9 (L) 11/07/2018    HCT 32.8 (L) 11/07/2018     (H) 11/07/2018    PLT 84 (L) 11/07/2018      Lab Results   Component Value Date    GLU 83 11/06/2018     11/06/2018    K 5.2 (H) 11/06/2018     11/06/2018    CO2 23 11/06/2018    BUN 61 (H) 11/06/2018    CREATININE 7.2 (H) 11/06/2018    CALCIUM 8.5 (L) 11/06/2018    MG 2.1 11/06/2018    ALT 29 11/06/2018    AST 64 (H) 11/06/2018    ALBUMIN 2.5 (L) 11/06/2018    BILITOT 36.2 (H) 11/06/2018       ASSESSMENT/PLAN:     Sedation Plan: Local.  Patient will undergo Thoracentesis.    Sumit Lujan MD  PGY-II Radiology Resident  [address] [facility]  Pager: [phone]

## 2018-11-07 NOTE — PROGRESS NOTES
[facility]  Nephrology  Progress Note    Patient Name: Femi Enciso  MRN: [id number]  Admission Date: 10/27/2018  Hospital Length of Stay: 11 days  Attending Provider: Hai Wylie MD   Primary Care Physician: Primary Doctor No  Principal Problem:Pre-transplant evaluation for liver transplant    Subjective:     HPI: 52 y/o man with DM2 presents to the ED with family for liver failure (likely due to EtOH abundant history of drinking - diagnosed in Sept 2018 in [place]).  He reports jaundice, generalized weakness, nausea, diarrhea, and decreased appetite since Sept 2018.  He is from [place], [place], and Dr. Sharma (patients physician) recommended bringing him to hospital for evaluation.  Patient denies any fever, chills, vomiting, chest pain, palpitations, SOB, abdominal pain.      Nephrology consulted for evaluation/management Adri.     Interval History:   NAEON, Events over the weekend noted. Seen today while on HD in [place] tolerating well new cather with 400 ml/min. Got his perm-cath. MS stable alert and awake Oriented x 3. He is hemodynamically stable. Remains anuric. Oxygenation stable RA. WEight gain ~ 3kg since last treatment BP lows  Review of patient's allergies indicates:   Allergen Reactions    Penicillins Nausea And Vomiting and Rash     Current Facility-Administered Medications   Medication Frequency    0.9%  NaCl infusion (for blood administration) Q24H PRN    0.9%  NaCl infusion PRN    0.9%  NaCl infusion Once    acetaminophen tablet 650 mg Q4H PRN    albumin human 25% bottle 25 g PRN    albuterol-ipratropium 2.5 mg-0.5 mg/3 mL nebulizer solution 3 mL Q6H PRN    dextrose 50% injection 12.5 g PRN    dextrose 50% injection 25 g PRN    fentaNYL injection PRN    fluconazole tablet 100 mg Daily    folic acid tablet 1 mg Daily    glucagon (human recombinant) injection 1 mg PRN    glucose chewable tablet 16 g PRN    glucose chewable tablet 24 g PRN    hepatitis B virus vacc.rec(PF)  injection 40 mcg vaccine x 1 dose    hydrOXYzine HCl tablet 25 mg TID PRN    insulin aspart U-100 pen 0-5 Units QID (AC + HS) PRN    lactulose 20 gram/30 mL solution Soln 10 g BID    lidocaine (PF) 20 mg/ml (2%) injection PRN    lidocaine 5 % patch 1 patch Q24H    lidocaine 5 % patch 1 patch Q24H    midazolam (PF) injection PRN    midodrine tablet 15 mg TID    multivitamin tablet 1 tablet Daily    ondansetron disintegrating tablet 8 mg Q6H PRN    pantoprazole EC tablet 40 mg Daily    promethazine (PHENERGAN) 12.5 mg in dextrose 5 % 50 mL IVPB Q6H PRN    promethazine (PHENERGAN) 6.25 mg in dextrose 5 % 50 mL IVPB Q6H PRN    ramelteon tablet 8 mg Nightly PRN    rifAXIMin tablet 550 mg BID    sevelamer carbonate tablet 800 mg TID WM    sodium bicarbonate tablet 1,300 mg TID    sodium chloride 0.9% flush 3 mL PRN    sodium chloride 0.9% flush 5 mL PRN    thiamine tablet 100 mg Daily    traMADol tablet 50 mg Q8H PRN    triamcinolone acetonide 0.1% cream BID       Objective:     Vital Signs (Most Recent):  Temp: 97.7 °F (36.5 °C) (11/07/18 1505)  Pulse: 79 (11/07/18 1600)  Resp: 16 (11/07/18 1505)  BP: (!) 96/51 (11/07/18 1600)  SpO2: 97 % (11/07/18 1315)  O2 Device (Oxygen Therapy): room air (11/07/18 1505) Vital Signs (24h Range):  Temp:  [96.4 °F (35.8 °C)-97.9 °F (36.6 °C)] 97.7 °F (36.5 °C)  Pulse:  [73-85] 79  Resp:  [16-18] 16  SpO2:  [92 %-98 %] 97 %  BP: ()/(51-61) 96/51     Weight: 88.5 kg (195 lb 1.7 oz) (11/07/18 1315)  Body mass index is 27.99 kg/m².  Body surface area is 2.09 meters squared.    I/O last 3 completed shifts:  In: 1380 [P.O.:1280; I.V.:100]  Out: -     Physical Exam   Constitutional: He appears well-developed. He appears cachectic. He is cooperative. No distress.   Temporal musc wasting   HENT:   Head: Normocephalic and atraumatic.   Eyes: Conjunctivae are normal. Pupils are equal, round, and reactive to light.   Neck: Trachea normal and normal range of motion. Neck  supple. No JVD present.   Cardiovascular: Normal rate, regular rhythm, S1 normal, S2 normal and normal pulses. Exam reveals no gallop and no friction rub.   No murmur heard.  Pulmonary/Chest: Effort normal. He has decreased breath sounds in the right middle field, the right lower field and the left lower field. He has no rhonchi.   Abdominal: Soft. Bowel sounds are normal. He exhibits distension and ascites. There is no tenderness.   Musculoskeletal: Normal range of motion. He exhibits no edema.   Neurological: He is alert.   Skin: Skin is warm and dry. Capillary refill takes less than 2 seconds.   Psychiatric: He has a normal mood and affect. His behavior is normal.   Vitals reviewed.      Significant Labs:  ABGs: No results for input(s): PH, PCO2, HCO3, POCSATURATED, BE in the last 168 hours.  BMP:   Recent Labs   Lab 11/07/18  0631   GLU 81   CL 98   CO2 25   BUN 76*   CREATININE 8.7*   CALCIUM 8.8   MG 2.1     Cardiac Markers: No results for input(s): CKMB, TROPONINT, MYOGLOBIN in the last 168 hours.  CBC:   Recent Labs   Lab 11/07/18  0631   WBC 14.90*   RBC 3.25*   HGB 10.9*   HCT 32.8*   PLT 84*   *   MCH 33.5*   MCHC 33.2     CMP:   Recent Labs   Lab 11/07/18  0631   GLU 81   CALCIUM 8.8   ALBUMIN 2.3*   PROT 5.2*      K 5.3*   CO2 25   CL 98   BUN 76*   CREATININE 8.7*   ALKPHOS 227*   ALT 28   AST 63*   BILITOT 37.4*     Coagulation:   Recent Labs   Lab 11/07/18  0631   INR 2.1*     No results for input(s): COLORU, CLARITYU, SPECGRAV, PHUR, PROTEINUA, GLUCOSEU, BILIRUBINCON, BLOODU, WBCU, RBCU, BACTERIA, MUCUS, NITRITE, LEUKOCYTESUR, UROBILINOGEN, HYALINECASTS in the last 168 hours.  All labs within the past 24 hours have been reviewed.     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   Plan:  - No evidence of renal recovery remains anuric.  - HD x 3.5 hrs today for metabolic clearance and volume management seen while on HD new catheter with  ml/min  - UF ~ 1-2 lts as tolerated  - Remains anuric  - UCx  Enterococcus Cloacae  > 100 K on Cefepime which is sensitive treatment should compete as per primary team  - UPCR low 0.26 g/g  - Strict I/O and chart  - Avoid nephrotoxic medications  - Maintain MAP >65 please continue midodrine  - please keep Hb > 7 gm/dL  - Medication doses adjusted to GFR  - Phos 6.7 Renvela 800 mg with AC   - Renal diet           Thank you for your consult. I will follow-up with patient. Please contact us if you have any additional questions.    Nikolay Nino MD  Nephrology  [facility]

## 2018-11-07 NOTE — PROGRESS NOTES
Progress Note   Hospital Medicine         Patient Name: Femi Enciso  MRN:  [id number]  [facility] Medicine Team: [facility] L Hai Wylie MD  Date of Admission:  10/27/2018     Length of Stay:  LOS: 10 days   Expected Discharge Date: 11/13/2018  Principal Problem:  Pre-transplant evaluation for liver transplant       Subjective:     Interval History/Overnight Events:  Patient went for EGD today and was found to have large varices that were banded; patient was hypothermic over night and rising WBC; given Vanc and rocephin; blood cultures NGTD  - new rash noted on patient's arms and legs, dermatology consulted and was started on TAC BID; rocephin stopped  - procal 3.11  - with concerns of sepsis, get IR to do thoracocentesis tomorrow; patient to be presented to liver transplant committee tomorrow;     - patient currently not deemed a kidney tx for atleast another 4 weeks;    Review of Systems   Constitutional: Negative for chills, fatigue, fever.   HENT: Negative for sore throat, trouble swallowing.    Eyes: Negative for photophobia, visual disturbance.   Respiratory: Negative for cough, shortness of breath.    Cardiovascular: Negative for chest pain, palpitations, leg swelling.   Gastrointestinal: Negative for abdominal pain, constipation, diarrhea, nausea, vomiting.   Endocrine: Negative for cold intolerance, heat intolerance.   Genitourinary: Negative for dysuria, frequency.   Musculoskeletal: Negative for arthralgias, myalgias.   Skin: Negative for rash, wound, erythema   Neurological: Negative for dizziness, syncope, weakness, light-headedness.   Psychiatric/Behavioral: Negative for confusion, hallucinations, anxiety  All other systems reviewed and are negative.    Objective:     Temp:  [37.4 °F (3 °C)-99.1 °F (37.3 °C)]   Pulse:  [75-89]   Resp:  [15-18]   BP: ()/(48-68)   SpO2:  [93 %-99 %]       Physical Exam:  Constitutional: Appears well-developed and well-nourished.   Head: Normocephalic and  atraumatic.   Mouth/Throat: Oropharynx is clear and moist.   Eyes: EOM are normal. Pupils are equal, round, and reactive to light. No scleral icterus.   Neck: Normal range of motion. Neck supple.   Cardiovascular: Normal rate and regular rhythm.  No murmur heard.  Pulmonary/Chest: Effort normal and breath sounds normal. No respiratory distress. No wheezes, rales, or rhonchi  Abdominal: Soft. Bowel sounds are normal.  No distension or tenderness  Musculoskeletal: Normal range of motion. No edema.   Neurological: Alert and oriented to person, place, and time.   Skin: Skin is warm and dry.   Psychiatric: Normal mood and affect. Behavior is normal.     Recent Labs   Lab 11/01/18  0400 11/02/18  0452 11/03/18  0513 11/04/18  0424 11/05/18  0350 11/06/18  0525   WBC 9.77 13.20* 10.34 11.75 15.97* 15.34*   HGB 10.6* 11.0* 10.3* 10.4* 10.2* 10.4*   HCT 30.6* 31.8* 30.8* 30.6* 30.0* 31.3*   PLT 45* 59* 50* 52* 68* 61*     Recent Labs   Lab 11/04/18  0424 11/05/18  0350 11/06/18  0525   * 133* 139   K 4.4 4.7 5.2*   CL 99 99 100   CO2 23 21* 23   BUN 64* 77* 61*   CREATININE 7.0* 8.1* 7.2*   GLU 88 110 83   CALCIUM 8.5* 8.3* 8.5*   MG 2.2 2.1 2.1   PHOS 5.0* 5.2* 5.3*     Recent Labs   Lab 11/04/18  0424 11/05/18  0350 11/06/18  0525   ALKPHOS 211* 213* 206*   ALT 33 32 29   AST 72* 69* 64*   ALBUMIN 2.4* 2.2* 2.5*   PROT 5.1* 4.9* 5.2*   BILITOT 33.4* 35.9* 36.2*   INR 2.0* 2.1* 2.3*     Recent Labs   Lab 11/05/18  1707 11/05/18  2248 11/06/18  0758 11/06/18  1027 11/06/18  1145 11/06/18  1742   POCTGLUCOSE 143* 125* 80 77 76 119*        fluconazole  100 mg Oral Daily    folic acid  1 mg Oral Daily    [START ON 11/7/2018] hepatitis A virus vaccine (PF)  1,440 Units Intramuscular Once    lactulose  10 g Oral BID    lidocaine  1 patch Transdermal Q24H    lidocaine  1 patch Transdermal Q24H    midodrine  15 mg Oral TID    multivitamin  1 tablet Oral Daily    pantoprazole  40 mg Oral Daily    rifAXImin  550 mg  Oral BID    sevelamer carbonate  800 mg Oral TID WM    sodium bicarbonate  1,300 mg Oral TID    thiamine  100 mg Oral Daily    triamcinolone acetonide 0.1%   Topical (Top) BID       Assessment and Plan     Mr. Femi Enciso is a 53 y.o. male who presented to [facility] on 10/27/2018 with     Hospital Course:    Mr. Femi Enciso was admitted to Hospital Medicine for management of     Active Hospital Problems    Diagnosis  POA    *Pre-transplant evaluation for liver transplant [Z01.818]  Not Applicable    Encounter for pre-transplant evaluation for kidney transplant [Z01.818]  Not Applicable    Acute maculopapular rash [R21]  Unknown    Alcohol use disorder, severe, in early remission [F10.21]  Yes    Decompensated hepatic cirrhosis [K72.90]  Yes    Acute liver failure without hepatic coma [K72.00]  Yes    Alcoholic hepatitis with ascites [K70.11]  Yes    Acute liver failure [K72.00]  Yes    Jaundice [R17]  Yes    Hepatorenal syndrome [K76.7]  Yes    DEL (acute kidney injury) [N17.9]  Yes     Chronic    Acute kidney failure with lesion of tubular necrosis [N17.0]  Yes    Severe alcohol dependence [F10.20]  Yes    Coagulopathy [D68.9]  Yes    Anemia of chronic disease [D63.8]  Yes    Thrombocytopenia [D69.6]  Yes    Hyponatremia [E87.1]  Yes    Hepatic encephalopathy [K72.90]  Yes    Metabolic acidosis [E87.2]  Yes    Moderate protein malnutrition [E44.0]  Yes    Type 2 diabetes mellitus without complication [E11.9]  Yes    Acute cystitis without hematuria [N30.00]  Yes    Pleural effusion associated with hepatic disorder [K76.9, J91.8]  Yes      Resolved Hospital Problems    Diagnosis Date Resolved POA    Chronic kidney disease-mineral and bone disorder [N18.9, E83.9, M89.9] 11/02/2018 Yes    Sepsis [A41.9] 10/29/2018 Yes           Severe alcohol dependence     - Last drink on 09/21/2018  - Unlikely to have DTs  - Continue to monitor   Renal/   DEL (acute kidney injury)  ATN     - nephrology  consulted; HD per them  -      ID   Sepsis     - unclear etiology  - hypothermia and leukocytosis  - blood cultures NGTD  - CXR shows RLL consolidation/effusion, will get IR thoracocentesis for further evaluation; given vanc and rocephin today       Hematology   Thrombocytopenia     - Likely due to liver dz  - no sign of bleeding  - Continue to monitor      Coagulopathy     - INR 2.3 on 10/28 upon admission to ICU  - now 2.0   Oncology   Anemia of chronic disease     - h/h stable , continue to monitor      Endocrine   Type 2 diabetes mellitus without complication     - SSI  - Hold metformin at home      GI   * Acute liver failure without hepatic coma  Alcohol cirrhosis with ascites  Esophageal varices non bleeding      MELD-Na score: 43 at 11/6/2018  5:25 AM  MELD score: 43 at 11/6/2018  5:25 AM  Calculated from:  Serum Creatinine: 7.2 mg/dL (Rounded to 4 mg/dL) at 11/6/2018  5:25 AM  Serum Sodium: 139 mmol/L (Rounded to 137 mmol/L) at 11/6/2018  5:25 AM  Total Bilirubin: 36.2 mg/dL at 11/6/2018  5:25 AM  INR(ratio): 2.3 at 11/6/2018  5:25 AM  Age: 53 years   - hepatology consulted  - under going liver tx evaluation; planing on presentation tomorrow       Hepatic encephalopathy     - Continue lactulose and rifaximine  - Monitor BM accordingly      Pleural effusion associated with hepatic disorder     - CXR much improved s/p drainage  - Chest tube removed on 10/31/2018  -on RA   Hepatorenal syndrome     - Continue midodrine, albumin  - Maintain MAP>65      Decompensated hepatic cirrhosis     - see above             High Risk Conditions:  Liver failure, renal failure    Disposition:  Patient to be presented tomorrow for liver transplant; too sick to leave prior to transplant;     Hai Wlyie MD  Medical Director [facility] Medicine  Spectra:  [phone]  Pager: [phone]

## 2018-11-07 NOTE — ASSESSMENT & PLAN NOTE
Femi Enciso is a 53 y.o. male with alcoholic cirrhosis admitted for liver transplantation evaluation. Hospital course complicated by hepatorenal syndrome requiring dialysis and sepsis believed secondary to UTI. Current infectious work-up today otherwise unremarkable. Now with a ~1 week hx of ill-defined, evanescent and erythematous extremity rash. Dermatology consulted for evaluation.     Evanescent and erythematous macular rash: favor drug induced rash due to cefepime. Patient with possible hx of penicillin allergy with recent 7 day course of cefepime (cross reacts w/ penicillins) with development of intermittent, itchy red rash. Drug rashes tend to occur 5-7 days after initiation of antibiotics. However, given patient's continued leukocytosis (possible incompletely treated UTI?) we cannot rule out a more concerning infectious process (TSS from strep or staph). Lack of scarlatiniform rash, high grade fever, palmar involvement or desquamation makes this less likely. Little concern for bullous drug eruption (TEN/SJS unlikely given lack of bulla), DRESS (no eosinophilia, no angioedema).   - Ceftriaxone dc'ed.   - Continue TAC 0.1% cream; apply to the affected sites BID.   - Continue hydroxyzine 25 mg po q8h prn; watch for increasing somnolence.   - Biopsy unlikely to reveal etiology.   - Rash should not preclude patient from transplantation but in light of continued leukocytosis, can not rule out rash as manifestation of underlying infection. If this were the case, current drug regimen would adequately treat.   - If rash worsens (increased spread, blister development, oral involvement, etc.), please contact us.

## 2018-11-08 ENCOUNTER — TELEPHONE (OUTPATIENT)
Dept: TRANSPLANT | Facility: CLINIC | Age: 53
End: 2018-11-08

## 2018-11-08 LAB
ALBUMIN SERPL BCP-MCNC: 2.3 G/DL
ALP SERPL-CCNC: 221 U/L
ALT SERPL W/O P-5'-P-CCNC: 30 U/L
ANION GAP SERPL CALC-SCNC: 12 MMOL/L
ASO AB SERPL-ACNC: 107 IU/ML
AST SERPL-CCNC: 69 U/L
BASOPHILS # BLD AUTO: 0.14 K/UL
BASOPHILS NFR BLD: 0.9 %
BILIRUB SERPL-MCNC: 36.6 MG/DL
BUN SERPL-MCNC: 40 MG/DL
CALCIUM SERPL-MCNC: 8.4 MG/DL
CHLORIDE SERPL-SCNC: 101 MMOL/L
CO2 SERPL-SCNC: 25 MMOL/L
CREAT SERPL-MCNC: 5.9 MG/DL
DIFFERENTIAL METHOD: ABNORMAL
EOSINOPHIL # BLD AUTO: 0.5 K/UL
EOSINOPHIL NFR BLD: 3.1 %
ERYTHROCYTE [DISTWIDTH] IN BLOOD BY AUTOMATED COUNT: 19.1 %
EST. GFR  (AFRICAN AMERICAN): 11.6 ML/MIN/1.73 M^2
EST. GFR  (NON AFRICAN AMERICAN): 10 ML/MIN/1.73 M^2
GLUCOSE SERPL-MCNC: 77 MG/DL
HCT VFR BLD AUTO: 32.2 %
HGB BLD-MCNC: 10.7 G/DL
IMM GRANULOCYTES # BLD AUTO: 0.15 K/UL
IMM GRANULOCYTES NFR BLD AUTO: 0.9 %
INR PPP: 2
LYMPHOCYTES # BLD AUTO: 1.6 K/UL
LYMPHOCYTES NFR BLD: 10 %
MAGNESIUM SERPL-MCNC: 2 MG/DL
MCH RBC QN AUTO: 33.2 PG
MCHC RBC AUTO-ENTMCNC: 33.2 G/DL
MCV RBC AUTO: 100 FL
MONOCYTES # BLD AUTO: 1.7 K/UL
MONOCYTES NFR BLD: 10.9 %
NEUTROPHILS # BLD AUTO: 11.8 K/UL
NEUTROPHILS NFR BLD: 74.2 %
NRBC BLD-RTO: 0 /100 WBC
PHOSPHATE SERPL-MCNC: 4.6 MG/DL
PLATELET # BLD AUTO: 90 K/UL
PMV BLD AUTO: 12.6 FL
POCT GLUCOSE: 101 MG/DL (ref 70–110)
POCT GLUCOSE: 124 MG/DL (ref 70–110)
POCT GLUCOSE: 141 MG/DL (ref 70–110)
POTASSIUM SERPL-SCNC: 5 MMOL/L
PROT SERPL-MCNC: 5.3 G/DL
PROTHROMBIN TIME: 19.6 SEC
RBC # BLD AUTO: 3.22 M/UL
SODIUM SERPL-SCNC: 138 MMOL/L
WBC # BLD AUTO: 15.85 K/UL

## 2018-11-08 PROCEDURE — 83735 ASSAY OF MAGNESIUM: CPT | Mod: NTX

## 2018-11-08 PROCEDURE — 93010 ELECTROCARDIOGRAM REPORT: CPT | Mod: NTX,,, | Performed by: INTERNAL MEDICINE

## 2018-11-08 PROCEDURE — 25000003 PHARM REV CODE 250: Mod: NTX | Performed by: HOSPITALIST

## 2018-11-08 PROCEDURE — 85610 PROTHROMBIN TIME: CPT | Mod: NTX

## 2018-11-08 PROCEDURE — 25000003 PHARM REV CODE 250: Mod: NTX | Performed by: INTERNAL MEDICINE

## 2018-11-08 PROCEDURE — 93005 ELECTROCARDIOGRAM TRACING: CPT | Mod: NTX

## 2018-11-08 PROCEDURE — 25000003 PHARM REV CODE 250: Mod: NTX | Performed by: STUDENT IN AN ORGANIZED HEALTH CARE EDUCATION/TRAINING PROGRAM

## 2018-11-08 PROCEDURE — 85025 COMPLETE CBC W/AUTO DIFF WBC: CPT | Mod: NTX

## 2018-11-08 PROCEDURE — 97535 SELF CARE MNGMENT TRAINING: CPT | Mod: NTX

## 2018-11-08 PROCEDURE — 80053 COMPREHEN METABOLIC PANEL: CPT | Mod: NTX

## 2018-11-08 PROCEDURE — A4216 STERILE WATER/SALINE, 10 ML: HCPCS | Mod: NTX | Performed by: HOSPITALIST

## 2018-11-08 PROCEDURE — 93010 EKG 12-LEAD: ICD-10-PCS | Mod: NTX,,, | Performed by: INTERNAL MEDICINE

## 2018-11-08 PROCEDURE — 99233 SBSQ HOSP IP/OBS HIGH 50: CPT | Mod: NTX,,, | Performed by: HOSPITALIST

## 2018-11-08 PROCEDURE — 97110 THERAPEUTIC EXERCISES: CPT | Mod: NTX

## 2018-11-08 PROCEDURE — 99233 PR SUBSEQUENT HOSPITAL CARE,LEVL III: ICD-10-PCS | Mod: NTX,,, | Performed by: INTERNAL MEDICINE

## 2018-11-08 PROCEDURE — 84100 ASSAY OF PHOSPHORUS: CPT | Mod: NTX

## 2018-11-08 PROCEDURE — 99233 SBSQ HOSP IP/OBS HIGH 50: CPT | Mod: NTX,,, | Performed by: INTERNAL MEDICINE

## 2018-11-08 PROCEDURE — 99233 PR SUBSEQUENT HOSPITAL CARE,LEVL III: ICD-10-PCS | Mod: NTX,,, | Performed by: HOSPITALIST

## 2018-11-08 PROCEDURE — 36415 COLL VENOUS BLD VENIPUNCTURE: CPT | Mod: NTX

## 2018-11-08 PROCEDURE — 20600001 HC STEP DOWN PRIVATE ROOM: Mod: NTX

## 2018-11-08 RX ORDER — SODIUM CHLORIDE 9 MG/ML
INJECTION, SOLUTION INTRAVENOUS
Status: DISCONTINUED | OUTPATIENT
Start: 2018-11-08 | End: 2018-11-11

## 2018-11-08 RX ORDER — SODIUM BICARBONATE 650 MG/1
650 TABLET ORAL 3 TIMES DAILY
Status: DISCONTINUED | OUTPATIENT
Start: 2018-11-08 | End: 2018-11-11

## 2018-11-08 RX ORDER — SODIUM CHLORIDE 9 MG/ML
INJECTION, SOLUTION INTRAVENOUS ONCE
Status: DISCONTINUED | OUTPATIENT
Start: 2018-11-08 | End: 2018-11-11

## 2018-11-08 RX ORDER — CIPROFLOXACIN 250 MG/1
250 TABLET, FILM COATED ORAL EVERY 24 HOURS
Status: DISCONTINUED | OUTPATIENT
Start: 2018-11-08 | End: 2018-11-11

## 2018-11-08 RX ADMIN — SODIUM BICARBONATE 650 MG TABLET 650 MG: at 08:11

## 2018-11-08 RX ADMIN — MIDODRINE HYDROCHLORIDE 15 MG: 5 TABLET ORAL at 02:11

## 2018-11-08 RX ADMIN — Medication 100 MG: at 08:11

## 2018-11-08 RX ADMIN — TRIAMCINOLONE ACETONIDE: 1 CREAM TOPICAL at 08:11

## 2018-11-08 RX ADMIN — LACTULOSE 10 G: 20 SOLUTION ORAL at 08:11

## 2018-11-08 RX ADMIN — SEVELAMER CARBONATE 800 MG: 800 TABLET, FILM COATED ORAL at 08:11

## 2018-11-08 RX ADMIN — RIFAXIMIN 550 MG: 550 TABLET ORAL at 08:11

## 2018-11-08 RX ADMIN — THERA TABS 1 TABLET: TAB at 08:11

## 2018-11-08 RX ADMIN — SODIUM BICARBONATE 650 MG TABLET 650 MG: at 02:11

## 2018-11-08 RX ADMIN — Medication 5 ML: at 08:11

## 2018-11-08 RX ADMIN — LIDOCAINE 1 PATCH: 50 PATCH CUTANEOUS at 03:11

## 2018-11-08 RX ADMIN — PANTOPRAZOLE SODIUM 40 MG: 40 TABLET, DELAYED RELEASE ORAL at 08:11

## 2018-11-08 RX ADMIN — FLUCONAZOLE 100 MG: 100 TABLET ORAL at 08:11

## 2018-11-08 RX ADMIN — CIPROFLOXACIN HYDROCHLORIDE 250 MG: 250 TABLET, FILM COATED ORAL at 09:11

## 2018-11-08 RX ADMIN — SEVELAMER CARBONATE 800 MG: 800 TABLET, FILM COATED ORAL at 06:11

## 2018-11-08 RX ADMIN — FOLIC ACID 1 MG: 1 TABLET ORAL at 08:11

## 2018-11-08 RX ADMIN — SEVELAMER CARBONATE 800 MG: 800 TABLET, FILM COATED ORAL at 12:11

## 2018-11-08 RX ADMIN — MIDODRINE HYDROCHLORIDE 15 MG: 5 TABLET ORAL at 08:11

## 2018-11-08 NOTE — PLAN OF CARE
Problem: Patient Care Overview  Goal: Plan of Care Review  Outcome: Ongoing (interventions implemented as appropriate)  Pt is alert and oriented x4 and verbally responsive to care. VSS. No c/o pain or discomfort. Pt arrived back to unit at end of shift and had BM. Dressing to RUC permacath dry and intact. Pt resting in bed at this time with call light in reach, cont to monitor.

## 2018-11-08 NOTE — PLAN OF CARE
Problem: Occupational Therapy Goal  Goal: Occupational Therapy Goal  Goals to be met by: 11/23/18     Patient will increase functional independence with ADLs by performing:    UE Dressing with Supervision.  LE Dressing with Minimal Assistance. MET 11/8  REVISED to SBA,  Grooming while standing at sink with Stand-by Assistance. MET 11/8  Toileting from toilet with Stand-by Assistance for hygiene and clothing management.   Stand pivot transfers with Stand-by Assistance. MET 11/8  Toilet transfer to toilet with Stand-by Assistance.  Upper extremity  theraband exercise program x  15 reps  with independence.  Pt will perform a functional standing task x 8 min with AD as needed and SBA and monitor light headedness and need to sit for safety     Outcome: Ongoing (interventions implemented as appropriate)  Con't POC.    ISACC [place]

## 2018-11-08 NOTE — PLAN OF CARE
Problem: Patient Care Overview  Goal: Plan of Care Review  Outcome: Ongoing (interventions implemented as appropriate)  Pt's VSS thoughout shift.  No falls, afebrile no c/o pain. Pt will continue to remain stable.  WCTM.

## 2018-11-08 NOTE — SUBJECTIVE & OBJECTIVE
Interval History: Patient approved for liver transplant by our selection committee. We are awaiting financial approval from his insurance company. He overall is feeling well. Reports a good appetite and ate a full breakfast this morning. He reports increased swelling in his right leg, greater than his left leg.    Current Facility-Administered Medications   Medication    0.9%  NaCl infusion (for blood administration)    0.9%  NaCl infusion    0.9%  NaCl infusion    acetaminophen tablet 650 mg    albumin human 25% bottle 25 g    albuterol-ipratropium 2.5 mg-0.5 mg/3 mL nebulizer solution 3 mL    ciprofloxacin HCl tablet 250 mg    dextrose 50% injection 12.5 g    dextrose 50% injection 25 g    fluconazole tablet 100 mg    folic acid tablet 1 mg    glucagon (human recombinant) injection 1 mg    glucose chewable tablet 16 g    glucose chewable tablet 24 g    heparin (porcine) injection 1,000 Units    hepatitis B virus vacc.rec(PF) injection 40 mcg    hydrOXYzine HCl tablet 25 mg    insulin aspart U-100 pen 0-5 Units    lactulose 20 gram/30 mL solution Soln 10 g    lidocaine 5 % patch 1 patch    lidocaine 5 % patch 1 patch    midodrine tablet 15 mg    multivitamin tablet 1 tablet    ondansetron disintegrating tablet 8 mg    pantoprazole EC tablet 40 mg    promethazine (PHENERGAN) 12.5 mg in dextrose 5 % 50 mL IVPB    promethazine (PHENERGAN) 6.25 mg in dextrose 5 % 50 mL IVPB    ramelteon tablet 8 mg    rifAXIMin tablet 550 mg    sevelamer carbonate tablet 800 mg    sodium bicarbonate tablet 650 mg    sodium chloride 0.9% flush 3 mL    sodium chloride 0.9% flush 5 mL    thiamine tablet 100 mg    traMADol tablet 50 mg    triamcinolone acetonide 0.1% cream       Objective:     Vital Signs (Most Recent):  Temp: 98.2 °F (36.8 °C) (11/08/18 0812)  Pulse: 83 (11/08/18 1200)  Resp: 18 (11/08/18 0812)  BP: (!) 98/51 (11/08/18 0812)  SpO2: (!) 94 % (11/08/18 0812) Vital Signs (24h  Range):  Temp:  [97.6 °F (36.4 °C)-98.2 °F (36.8 °C)] 98.2 °F (36.8 °C)  Pulse:  [73-89] 83  Resp:  [16-18] 18  SpO2:  [94 %-98 %] 94 %  BP: ()/(44-61) 98/51     Weight: 98.5 kg (217 lb 2.5 oz) (11/08/18 0400)  Body mass index is 31.16 kg/m².    Physical Exam   Constitutional: He is oriented to person, place, and time.   Chronically ill-appearing   Eyes: Scleral icterus is present.   Cardiovascular: Normal rate and regular rhythm.   Pulmonary/Chest: Effort normal and breath sounds normal.   Abdominal: Soft. He exhibits no distension. There is no tenderness.   Musculoskeletal: He exhibits no deformity.   RLE edema   Neurological: He is alert and oriented to person, place, and time.   Skin: Skin is warm and dry.   jaundce   Psychiatric: He has a normal mood and affect.   Vitals reviewed.      MELD-Na score: 41 at 11/8/2018  4:29 AM  MELD score: 41 at 11/8/2018  4:29 AM  Calculated from:  Serum Creatinine: 5.9 mg/dL (Rounded to 4 mg/dL) at 11/8/2018  4:29 AM  Serum Sodium: 138 mmol/L (Rounded to 137 mmol/L) at 11/8/2018  4:29 AM  Total Bilirubin: 36.6 mg/dL at 11/8/2018  4:29 AM  INR(ratio): 2 at 11/8/2018  4:29 AM  Age: 53 years    Significant Labs:  CBC:   Recent Labs   Lab 11/08/18 0429   WBC 15.85*   RBC 3.22*   HGB 10.7*   HCT 32.2*   PLT 90*     CMP:   Recent Labs   Lab 11/08/18 0429   GLU 77   CALCIUM 8.4*   ALBUMIN 2.3*   PROT 5.3*      K 5.0   CO2 25      BUN 40*   CREATININE 5.9*   ALKPHOS 221*   ALT 30   AST 69*   BILITOT 36.6*     Coagulation:   Recent Labs   Lab 11/08/18 0429   INR 2.0*

## 2018-11-08 NOTE — PROGRESS NOTES
Progress Note   Hospital Medicine         Patient Name: Femi Enciso  MRN:  [id number]  [facility] Medicine Team: [facility] Hai Wylie MD  Date of Admission:  10/27/2018     Length of Stay:  LOS: 12 days   Expected Discharge Date: 11/13/2018  Principal Problem:  Pre-transplant evaluation for liver transplant       Subjective:     Interval History/Overnight Events:  Patient doing well today, getting RLE U/S to evaluate swollen leg     - patient has been listed and will be transferred to LTS in the AM;     Review of Systems   Constitutional: Negative for chills, fatigue, fever.   HENT: Negative for sore throat, trouble swallowing.    Eyes: Negative for photophobia, visual disturbance.   Respiratory: Negative for cough, shortness of breath.    Cardiovascular: Negative for chest pain, palpitations, leg swelling.   Gastrointestinal: Negative for abdominal pain, constipation, diarrhea, nausea, vomiting.   Endocrine: Negative for cold intolerance, heat intolerance.   Genitourinary: Negative for dysuria, frequency.   Musculoskeletal: Negative for arthralgias, myalgias.   Skin: Negative for rash, wound, erythema   Neurological: Negative for dizziness, syncope, weakness, light-headedness.   Psychiatric/Behavioral: Negative for confusion, hallucinations, anxiety  All other systems reviewed and are negative.    Objective:     Temp:  [97.6 °F (36.4 °C)-98.2 °F (36.8 °C)]   Pulse:  [74-89]   Resp:  [16-18]   BP: (83-98)/(44-55)   SpO2:  [93 %-98 %]       Physical Exam:  Constitutional: Appears well-developed and well-nourished.   Head: Normocephalic and atraumatic.   Mouth/Throat: Oropharynx is clear and moist.   Eyes: EOM are normal. Pupils are equal, round, and reactive to light. No scleral icterus.   Neck: Normal range of motion. Neck supple.   Cardiovascular: Normal rate and regular rhythm.  No murmur heard.  Pulmonary/Chest: Effort normal and breath sounds normal. No respiratory distress. No wheezes, rales, or  rhonchi  Abdominal: Soft. Bowel sounds are normal.  No distension or tenderness  Musculoskeletal: Normal range of motion. No edema.   Neurological: Alert and oriented to person, place, and time.   Skin: Skin is warm and dry.   Psychiatric: Normal mood and affect. Behavior is normal.     Recent Labs   Lab 11/03/18  0513 11/04/18  0424 11/05/18  0350 11/06/18  0525 11/07/18  0631 11/08/18  0429   WBC 10.34 11.75 15.97* 15.34* 14.90* 15.85*   HGB 10.3* 10.4* 10.2* 10.4* 10.9* 10.7*   HCT 30.8* 30.6* 30.0* 31.3* 32.8* 32.2*   PLT 50* 52* 68* 61* 84* 90*     Recent Labs   Lab 11/06/18  0525 11/07/18  0631 11/08/18  0429    138 138   K 5.2* 5.3* 5.0    98 101   CO2 23 25 25   BUN 61* 76* 40*   CREATININE 7.2* 8.7* 5.9*   GLU 83 81 77   CALCIUM 8.5* 8.8 8.4*   MG 2.1 2.1 2.0   PHOS 5.3* 6.7* 4.6*     Recent Labs   Lab 11/06/18  0525 11/07/18  0631 11/08/18  0429   ALKPHOS 206* 227* 221*   ALT 29 28 30   AST 64* 63* 69*   ALBUMIN 2.5* 2.3* 2.3*   PROT 5.2* 5.2* 5.3*   BILITOT 36.2* 37.4* 36.6*   INR 2.3* 2.1* 2.0*     Recent Labs   Lab 11/06/18  1742 11/06/18  2229 11/07/18  0918 11/07/18  1128 11/07/18  1609 11/07/18  2355   POCTGLUCOSE 119* 139* 79 81 87 101        sodium chloride 0.9%   Intravenous Once    ciprofloxacin HCl  250 mg Oral Daily    fluconazole  100 mg Oral Daily    folic acid  1 mg Oral Daily    lactulose  10 g Oral BID    lidocaine  1 patch Transdermal Q24H    lidocaine  1 patch Transdermal Q24H    midodrine  15 mg Oral TID    multivitamin  1 tablet Oral Daily    pantoprazole  40 mg Oral Daily    rifAXImin  550 mg Oral BID    sevelamer carbonate  800 mg Oral TID WM    sodium bicarbonate  650 mg Oral TID    thiamine  100 mg Oral Daily    triamcinolone acetonide 0.1%   Topical (Top) BID       Assessment and Plan     Mr. Femi Enciso is a 53 y.o. male who presented to [facility] on 10/27/2018 with     Hospital Course:    Mr. Femi Enciso was admitted to Hospital Medicine for management of      Active Hospital Problems    Diagnosis  POA    *Pre-transplant evaluation for liver transplant [Z01.818]  Not Applicable    Encounter for pre-transplant evaluation for kidney transplant [Z01.818]  Not Applicable    Acute maculopapular rash [R21]  Yes    Alcohol use disorder, severe, in early remission [F10.21]  Yes    Decompensated hepatic cirrhosis [K72.90]  Yes    Acute liver failure without hepatic coma [K72.00]  Yes    Alcoholic hepatitis with ascites [K70.11]  Yes    Acute liver failure [K72.00]  Yes    Jaundice [R17]  Yes    Hepatorenal syndrome [K76.7]  Yes    DEL (acute kidney injury) [N17.9]  Yes     Chronic    Acute kidney failure with lesion of tubular necrosis [N17.0]  Yes    Severe alcohol dependence [F10.20]  Yes    Coagulopathy [D68.9]  Yes    Anemia of chronic disease [D63.8]  Yes    Thrombocytopenia [D69.6]  Yes    Hyponatremia [E87.1]  Yes    Hepatic encephalopathy [K72.90]  Yes    Metabolic acidosis [E87.2]  Yes    Moderate protein malnutrition [E44.0]  Yes    Type 2 diabetes mellitus without complication [E11.9]  Yes    Acute cystitis without hematuria [N30.00]  Yes    Pleural effusion associated with hepatic disorder [K76.9, J91.8]  Yes      Resolved Hospital Problems    Diagnosis Date Resolved POA    Chronic kidney disease-mineral and bone disorder [N18.9, E83.9, M89.9] 11/02/2018 Yes    Sepsis [A41.9] 10/29/2018 Yes           Severe alcohol dependence     - Last drink on 09/21/2018  - Unlikely to have DTs  - Continue to monitor   Renal/   DEL (acute kidney injury)  ATN     - nephrology consulted; HD per them  -      ID   Sepsis     - rule out;        Hematology   Thrombocytopenia     - Likely due to liver dz  - no sign of bleeding  - Continue to monitor      Coagulopathy     - INR 2.3    Oncology   Anemia of chronic disease     - h/h stable , continue to monitor      Endocrine   Type 2 diabetes mellitus without complication     - SSI  - Hold metformin at home       GI   * Acute liver failure without hepatic coma  Alcohol cirrhosis with ascites  Esophageal varices non bleeding      MELD-Na score: 41 at 11/8/2018  4:29 AM  MELD score: 41 at 11/8/2018  4:29 AM  Calculated from:  Serum Creatinine: 5.9 mg/dL (Rounded to 4 mg/dL) at 11/8/2018  4:29 AM  Serum Sodium: 138 mmol/L (Rounded to 137 mmol/L) at 11/8/2018  4:29 AM  Total Bilirubin: 36.6 mg/dL at 11/8/2018  4:29 AM  INR(ratio): 2 at 11/8/2018  4:29 AM  Age: 53 years   - hepatology consulted  - completed liver transplant evaluation and has been approved for listing       Hepatic encephalopathy     - Continue lactulose and rifaximine  - Monitor BM accordingly      Pleural effusion associated with hepatic disorder     - CXR much improved s/p drainage  - Chest tube removed on 10/31/2018  - IR thoracentesis 11/7- 500 cc removed and negative for infectio  -on RA   Hepatorenal syndrome     - Continue midodrine, albumin  - Maintain MAP>65      Decompensated hepatic cirrhosis     - see above             High Risk Conditions:  Liver failure, renal failure    Disposition:  Patient accepted for liver transplant and for listing; patient to be transferred to LTS tomorrow; too sick to leave prior to transplant     Hai Wylie MD  Medical Director [facility]  Spectra:  [phone]  Pager: [phone]

## 2018-11-08 NOTE — PT/OT/SLP PROGRESS
Occupational Therapy   Treatment    Name: Femi Enciso  MRN: [id number]  Admitting Diagnosis:  Pre-transplant evaluation for liver transplant  1 Day Post-Op    Recommendations:     Discharge Recommendations: home with home health  Discharge Equipment Recommendations:     Barriers to discharge:       Subjective     Communicated with: RN prior to session.  Pain/Comfort:  · Pain Rating 1: 0/10    Patients cultural, spiritual, [employer] conflicts given the current situation: none stated    Objective:     Patient found with:      General Precautions: Standard, fall   Orthopedic Precautions:N/A   Braces:       Occupational Performance:    Bed Mobility:    · Patient completed Rolling/Turning to Left with  stand by assistance  · Patient completed Scooting/Bridging with stand by assistance  · Patient completed Supine to Sit with minimum assistance     Functional Mobility/Transfers:  · Patient completed Sit <> Stand Transfer with contact guard assistance  with  rolling walker   · Patient completed Bed <> Chair Transfer using Step Transfer technique with stand by assistance with rolling walker  · Functional Mobility: Pt walked ~ 250'  With SBA using a RW.    Activities of Daily Living:  · Grooming: supervision for oral care standing at sink.  · Lower Body Dressing: minimum assistance to don underwear.    Patient left up in chair with call button in reach    [facility] 6 Click:  [facility] Total Score: 20    Treatment & Education:  From chair level, pt completed BUE therex (2 x 15 reps each) with 3# dowel including:  - elbow flexion/extension  - shld presses  - hor Abd/Add  - lat pulls  Education:    Assessment:     Femi Enciso is a 53 y.o. male with a medical diagnosis of Pre-transplant evaluation for liver transplant.  He presents with decreased overall strength and endurance limiting his (I) in ADLs and functional mobility. Continue OT to maximize functional ability.  Performance deficits affecting function are weakness, impaired  endurance, impaired self care skills, impaired balance, impaired functional mobilty, decreased coordination.      Rehab Prognosis:  Good; patient would benefit from acute skilled OT services to address these deficits and reach maximum level of function.       Plan:     Patient to be seen 2 x/week to address the above listed problems via self-care/home management, therapeutic activities, therapeutic exercises  · Plan of Care Expires: 12/02/18  · Plan of Care Reviewed with: patient, spouse    This Plan of care has been discussed with the patient who was involved in its development and understands and is in agreement with the identified goals and treatment plan    GOALS:   Multidisciplinary Problems     Occupational Therapy Goals        Problem: Occupational Therapy Goal    Goal Priority Disciplines Outcome Interventions   Occupational Therapy Goal     OT, PT/OT Ongoing (interventions implemented as appropriate)    Description:  Goals to be met by: 11/23/18     Patient will increase functional independence with ADLs by performing:    UE Dressing with Supervision.  LE Dressing with Minimal Assistance. MET 11/8  REVISED to SBA,  Grooming while standing at sink with Stand-by Assistance. MET 11/8  Toileting from toilet with Stand-by Assistance for hygiene and clothing management.   Stand pivot transfers with Stand-by Assistance. MET 11/8  Toilet transfer to toilet with Stand-by Assistance.  Upper extremity  theraband exercise program x  15 reps  with independence.  Pt will perform a functional standing task x 8 min with AD as needed and SBA and monitor light headedness and need to sit for safety                       Time Tracking:     OT Date of Treatment: 11/08/18  OT Start Time: 0849  OT Stop Time: 0920  OT Total Time (min): 31 min    Billable Minutes:Self Care/Home Management 16  Therapeutic Exercise 15    ISACC Whitaker  11/8/2018

## 2018-11-08 NOTE — ASSESSMENT & PLAN NOTE
53 year old male with a history of HTN, HLD, DM Type 2, Depression, and Alcohol Dependence on who Hepatology is being consulted decompensated alcoholic cirrhosis and liver transplant evaluation. Patient with decompensated alcohol cirrhosis and a MELD of 42 therefore liver transplant would be the only live saving/curative measure. Of note patient with strong history of alcohol abuse and multiple complications at this time (UTI, uremia, HE, etc). Patient/family understand that although patient needs a liver he does need to undergo a liver transplant evaluation prior to selection committee and we are unsure how things will progress over the next couple of days due to how sick he is.     Approved for liver transplant by our selection committee, awaiting financial approval from his insurance company.    Recommendations:  --Daily CMP, CBC, and INR  --Continue antibiotics  --Continue HRS protocol  -- HD per nephrology  --Continue lactulose and rifaximin  --Continue folic acid and thiamine   --Nutrition consult  --PT and OT

## 2018-11-08 NOTE — TELEPHONE ENCOUNTER
Patient and his wife, Suzy, notified that he is being listed with meld of 41.  DAMARIS Son NP notified and will move patient to LTS tomorrow.  's Jorge Wylie and Gibran also notified of the listing and meld score.

## 2018-11-08 NOTE — PROGRESS NOTES
Hemodialysis tx complete, 0.657ml removed in 3.5 hour tx, tolerated well. Unable to reach net uf of 1.5L due to hypotension. Blood returned via right chest perm cath, both lumens hep locked, capped and taped. Report given to Kaylie JAMESON. Transferred from unit via stretcher by transport

## 2018-11-08 NOTE — PROGRESS NOTES
[facility]  Hepatology  Progress Note    Patient Name: Femi Enciso  MRN: [id number]  Admission Date: 10/27/2018  Hospital Length of Stay: 12 days  Attending Provider: Hai Wylie MD   Primary Care Physician: Primary Doctor No  Principal Problem:Pre-transplant evaluation for liver transplant    Subjective:     Transplant status: No    HPI: 53 yea rold male with a history of HTN, HLD, DM Type 2, Depression, and Alcohol Dependence on who Hepatology is being consulted decompensated alcoholic cirrhosis and liver transplant evaluation.     History obtain from chart review (records in care everywhere) and from speaking to daughter/wife who are both RN's.  Patient's wife states that he was in fair health until the end of September when they were on vacation (on 9/21/18 they went to [facility]).  She states that after eating a sandwhich, patient became very ill and started having diarrhea, nausea, and emesis (wife denies seeing patient consume alcohol during this time).  Eventually  gastroenteritis cleared up, however patient became jaundice and went to go see his GI doctor and was found to have elevated LFTs and hypoxic and was admitted to their local hospital.  Patient had a large right sided effusion which was tapped (1L negative cytology) and he was treated for a PNA.  Patient had a non contrasted abdominal CT at that time which showed multiple hypodensities and US of the abdomen was recommend which no discrete lesions. Furthermore he was also started on steroids for alcoholic hepatitis which were stopped when patient did not improve.     Referral sent to [facility] for liver transplant evaluation and he was approved for outpatient evaluation as well as appointment with Dr. Sharma. On 10/26 [facility] nurse spoke to patient's wife and reported that patient was worsening our team recommend going to the hospital for admission. Family instead got on a commercial flight and brought patient to ED.     Social  "History:  Patient has been a binge drinker for ~20+ years. When he binges he drinks a 5th of Vodka per day for a couple of days and then stops. He drinks to create an "alternative reality" per wife. He was sober from 6037-6513. In 2014 daughter had a stroke and he subsequently entered an IP [facility] for Depression and Alcohol dependence. He also worked with a counselor during this period who eventually discharged him from counseling. Of note has tried AA in the past but has not been effective. Family has not seen him drink in ~ 6 months however they did find a debit card transaction for alcohol purchase on 7/28/18 as well as an empty 5th of Vodka (this was after spending ~ 1 month with his daughter). He follows with a local PCP who did mention his LFT's were elevated last year and he subsequently stopped with resolution of his LFT's. No mention of cirrhosis until this September. In March 2018 he quit his job as a  as he was not happy with his job. Family feels that around this time is when he began to decline. Prior to September they reported intermittent episodes of confusion and inappropriateness. No DUI or incarcerations.    Summary of Care Everywhere:  Labs  10/2    A1AT (-)   Hep A antibody total + on 10/2 and - on 10/3   Hep A IgM -   Hep B core antibody total -   Hep B core IgM -   Hep B surface antibody and antigen -   Hep C antibody -   Hep E IgG and IgM -  10/3  C diff toxin B +  10/4   GI stool pathogen - (campy, salmonella, shigella, vibrio, yersinia, noro, rota)    Imaging/Cardiac Workup:  CT abdomen/pelvis 10/1/18  Liver: Enlarged liver measuring 22 cm in the midline.  There is multiple areas of low density within the liver.  Recommend a follow-up renal sonogram to rule out focal small hepatic lesions or cysts.  Biliary:  Mildly distended gallbladder.  Gallbladder wall appears thick.  No calcified gallstones are seen.  Cannot exclude sludge within the gallbladder as seen on " image number 34.  Pancreas: Normal.  No lesion, fluid collection, ductal dilatation, or atrophy.  No pancreatitis or pseudocysts are seen.  Spleen: Mild splenomegaly noted.    ECHO 10/2/18  Left Ventricle-Mildly enlarged left ventricular cavity size. Normal left ventricular wall thickness. Normal left ventricular  systolic function. Left ventricular ejection fraction is estimated at 65 %. No regional wall motion abnormalities. Normal diastolic function.    Right Ventricle-Normal right ventricular size and systolic function, RVSP 42.7 mmHg.          Interval History: Patient approved for liver transplant by our selection committee. We are awaiting financial approval from his insurance company. He overall is feeling well. Reports a good appetite and ate a full breakfast this morning. He reports increased swelling in his right leg, greater than his left leg.    Current Facility-Administered Medications   Medication    0.9%  NaCl infusion (for blood administration)    0.9%  NaCl infusion    0.9%  NaCl infusion    acetaminophen tablet 650 mg    albumin human 25% bottle 25 g    albuterol-ipratropium 2.5 mg-0.5 mg/3 mL nebulizer solution 3 mL    ciprofloxacin HCl tablet 250 mg    dextrose 50% injection 12.5 g    dextrose 50% injection 25 g    fluconazole tablet 100 mg    folic acid tablet 1 mg    glucagon (human recombinant) injection 1 mg    glucose chewable tablet 16 g    glucose chewable tablet 24 g    heparin (porcine) injection 1,000 Units    hepatitis B virus vacc.rec(PF) injection 40 mcg    hydrOXYzine HCl tablet 25 mg    insulin aspart U-100 pen 0-5 Units    lactulose 20 gram/30 mL solution Soln 10 g    lidocaine 5 % patch 1 patch    lidocaine 5 % patch 1 patch    midodrine tablet 15 mg    multivitamin tablet 1 tablet    ondansetron disintegrating tablet 8 mg    pantoprazole EC tablet 40 mg    promethazine (PHENERGAN) 12.5 mg in dextrose 5 % 50 mL IVPB    promethazine (PHENERGAN) 6.25 mg  in dextrose 5 % 50 mL IVPB    ramelteon tablet 8 mg    rifAXIMin tablet 550 mg    sevelamer carbonate tablet 800 mg    sodium bicarbonate tablet 650 mg    sodium chloride 0.9% flush 3 mL    sodium chloride 0.9% flush 5 mL    thiamine tablet 100 mg    traMADol tablet 50 mg    triamcinolone acetonide 0.1% cream       Objective:     Vital Signs (Most Recent):  Temp: 98.2 °F (36.8 °C) (11/08/18 0812)  Pulse: 83 (11/08/18 1200)  Resp: 18 (11/08/18 0812)  BP: (!) 98/51 (11/08/18 0812)  SpO2: (!) 94 % (11/08/18 0812) Vital Signs (24h Range):  Temp:  [97.6 °F (36.4 °C)-98.2 °F (36.8 °C)] 98.2 °F (36.8 °C)  Pulse:  [73-89] 83  Resp:  [16-18] 18  SpO2:  [94 %-98 %] 94 %  BP: ()/(44-61) 98/51     Weight: 98.5 kg (217 lb 2.5 oz) (11/08/18 0400)  Body mass index is 31.16 kg/m².    Physical Exam   Constitutional: He is oriented to person, place, and time.   Chronically ill-appearing   Eyes: Scleral icterus is present.   Cardiovascular: Normal rate and regular rhythm.   Pulmonary/Chest: Effort normal and breath sounds normal.   Abdominal: Soft. He exhibits no distension. There is no tenderness.   Musculoskeletal: He exhibits no deformity.   RLE edema   Neurological: He is alert and oriented to person, place, and time.   Skin: Skin is warm and dry.   jaundce   Psychiatric: He has a normal mood and affect.   Vitals reviewed.      MELD-Na score: 41 at 11/8/2018  4:29 AM  MELD score: 41 at 11/8/2018  4:29 AM  Calculated from:  Serum Creatinine: 5.9 mg/dL (Rounded to 4 mg/dL) at 11/8/2018  4:29 AM  Serum Sodium: 138 mmol/L (Rounded to 137 mmol/L) at 11/8/2018  4:29 AM  Total Bilirubin: 36.6 mg/dL at 11/8/2018  4:29 AM  INR(ratio): 2 at 11/8/2018  4:29 AM  Age: 53 years    Significant Labs:  CBC:   Recent Labs   Lab 11/08/18 0429   WBC 15.85*   RBC 3.22*   HGB 10.7*   HCT 32.2*   PLT 90*     CMP:   Recent Labs   Lab 11/08/18 0429   GLU 77   CALCIUM 8.4*   ALBUMIN 2.3*   PROT 5.3*      K 5.0   CO2 25      BUN  40*   CREATININE 5.9*   ALKPHOS 221*   ALT 30   AST 69*   BILITOT 36.6*     Coagulation:   Recent Labs   Lab 11/08/18  0429   INR 2.0*         Assessment/Plan:     Decompensated hepatic cirrhosis    53 year old male with a history of HTN, HLD, DM Type 2, Depression, and Alcohol Dependence on who Hepatology is being consulted decompensated alcoholic cirrhosis and liver transplant evaluation. Patient with decompensated alcohol cirrhosis and a MELD of 42 therefore liver transplant would be the only live saving/curative measure. Of note patient with strong history of alcohol abuse and multiple complications at this time (UTI, uremia, HE, etc). Patient/family understand that although patient needs a liver he does need to undergo a liver transplant evaluation prior to selection committee and we are unsure how things will progress over the next couple of days due to how sick he is.     Approved for liver transplant by our selection committee, awaiting financial approval from his insurance company.    Recommendations:  --Daily CMP, CBC, and INR  --Continue antibiotics  --Continue HRS protocol  -- HD per nephrology  --Continue lactulose and rifaximin  --Continue folic acid and thiamine   --Nutrition consult  --PT and OT          Thank you for your consult. I will follow-up with patient. Please contact us if you have any additional questions.    Maurice Williamson MD  Hepatology  [facility]

## 2018-11-08 NOTE — PT/OT/SLP PROGRESS
Physical Therapy      Patient Name:  Femi Enciso   MRN:  [id number]    Patient not seen today secondary to Other (Comment), Patient fatigue(Pts family declines therapy this day d/t fatigue with pt sleeping soundly this PM. ). Will follow-up per POC.    Dmitri العلي, PTA

## 2018-11-08 NOTE — CONSULTS
[facility]  Dermatology  Consult Note    Patient Name: Femi Enciso  MRN: [id number]  Admission Date: 10/27/2018  Hospital Length of Stay: 12 days  Attending Physician: Hai Wylie MD  Primary Care Provider: Primary Doctor No     Consults    Note serves to complete standing consult order. Please see note from 11/6.     Tessa Whelan MD  Dermatology  [facility]

## 2018-11-09 ENCOUNTER — ANESTHESIA EVENT (OUTPATIENT)
Dept: SURGERY | Facility: HOSPITAL | Age: 53
DRG: 005 | End: 2018-11-09
Payer: COMMERCIAL

## 2018-11-09 ENCOUNTER — ANESTHESIA (OUTPATIENT)
Dept: SURGERY | Facility: HOSPITAL | Age: 53
DRG: 005 | End: 2018-11-09
Payer: COMMERCIAL

## 2018-11-09 ENCOUNTER — TELEPHONE (OUTPATIENT)
Dept: TRANSPLANT | Facility: CLINIC | Age: 53
End: 2018-11-09

## 2018-11-09 PROBLEM — K72.10 END STAGE LIVER DISEASE: Status: ACTIVE | Noted: 2018-11-09

## 2018-11-09 LAB
ABO + RH BLD: NORMAL
ALBUMIN SERPL BCP-MCNC: 2.3 G/DL
ALBUMIN SERPL BCP-MCNC: 2.5 G/DL
ALP SERPL-CCNC: 244 U/L
ALP SERPL-CCNC: 244 U/L
ALT SERPL W/O P-5'-P-CCNC: 29 U/L
ALT SERPL W/O P-5'-P-CCNC: 29 U/L
ANION GAP SERPL CALC-SCNC: 10 MMOL/L
ANION GAP SERPL CALC-SCNC: 14 MMOL/L
ANISOCYTOSIS BLD QL SMEAR: SLIGHT
APTT BLDCRRT: 46.9 SEC
AST SERPL-CCNC: 69 U/L
AST SERPL-CCNC: 75 U/L
BACTERIA #/AREA URNS AUTO: NORMAL /HPF
BASOPHILS # BLD AUTO: 0.05 K/UL
BASOPHILS # BLD AUTO: 0.09 K/UL
BASOPHILS NFR BLD: 0.3 %
BASOPHILS NFR BLD: 0.7 %
BILIRUB DIRECT SERPL-MCNC: >14 MG/DL
BILIRUB SERPL-MCNC: 37.5 MG/DL
BILIRUB SERPL-MCNC: 37.6 MG/DL
BILIRUB UR QL STRIP: ABNORMAL
BLD GP AB SCN CELLS X3 SERPL QL: NORMAL
BLD PROD TYP BPU: NORMAL
BLOOD UNIT EXPIRATION DATE: NORMAL
BLOOD UNIT TYPE CODE: 5100
BLOOD UNIT TYPE CODE: 9500
BLOOD UNIT TYPE: NORMAL
BUN SERPL-MCNC: 20 MG/DL
BUN SERPL-MCNC: 50 MG/DL
CALCIUM SERPL-MCNC: 8.5 MG/DL
CALCIUM SERPL-MCNC: 8.6 MG/DL
CAOX CRY UR QL COMP ASSIST: NORMAL
CHLORIDE SERPL-SCNC: 102 MMOL/L
CHLORIDE SERPL-SCNC: 98 MMOL/L
CLARITY UR REFRACT.AUTO: ABNORMAL
CO2 SERPL-SCNC: 22 MMOL/L
CO2 SERPL-SCNC: 26 MMOL/L
CODING SYSTEM: NORMAL
COLOR UR AUTO: ABNORMAL
CREAT SERPL-MCNC: 3.6 MG/DL
CREAT SERPL-MCNC: 7.2 MG/DL
DIFFERENTIAL METHOD: ABNORMAL
DIFFERENTIAL METHOD: ABNORMAL
DISPENSE STATUS: NORMAL
EOSINOPHIL # BLD AUTO: 0.4 K/UL
EOSINOPHIL # BLD AUTO: 0.6 K/UL
EOSINOPHIL NFR BLD: 3.5 %
EOSINOPHIL NFR BLD: 3.7 %
ERYTHROCYTE [DISTWIDTH] IN BLOOD BY AUTOMATED COUNT: 18.4 %
ERYTHROCYTE [DISTWIDTH] IN BLOOD BY AUTOMATED COUNT: 18.7 %
EST. GFR  (AFRICAN AMERICAN): 21 ML/MIN/1.73 M^2
EST. GFR  (AFRICAN AMERICAN): 9.1 ML/MIN/1.73 M^2
EST. GFR  (NON AFRICAN AMERICAN): 18.2 ML/MIN/1.73 M^2
EST. GFR  (NON AFRICAN AMERICAN): 7.9 ML/MIN/1.73 M^2
ESTIMATED AVG GLUCOSE: 80 MG/DL
FIBRINOGEN PPP-MCNC: 190 MG/DL
GLUCOSE SERPL-MCNC: 101 MG/DL
GLUCOSE SERPL-MCNC: 99 MG/DL
GLUCOSE UR QL STRIP: NEGATIVE
HBA1C MFR BLD HPLC: 4.4 %
HCT VFR BLD AUTO: 31.3 %
HCT VFR BLD AUTO: 33.4 %
HGB BLD-MCNC: 10.4 G/DL
HGB BLD-MCNC: 10.9 G/DL
HGB UR QL STRIP: ABNORMAL
HYALINE CASTS UR QL AUTO: 0 /LPF
HYPOCHROMIA BLD QL SMEAR: ABNORMAL
IMM GRANULOCYTES # BLD AUTO: 0.09 K/UL
IMM GRANULOCYTES # BLD AUTO: 0.11 K/UL
IMM GRANULOCYTES NFR BLD AUTO: 0.7 %
IMM GRANULOCYTES NFR BLD AUTO: 0.7 %
INR PPP: 2.1
INR PPP: 2.2
KETONES UR QL STRIP: NEGATIVE
LEUKOCYTE ESTERASE UR QL STRIP: ABNORMAL
LYMPHOCYTES # BLD AUTO: 1.2 K/UL
LYMPHOCYTES # BLD AUTO: 1.5 K/UL
LYMPHOCYTES NFR BLD: 9.3 %
LYMPHOCYTES NFR BLD: 9.4 %
MAGNESIUM SERPL-MCNC: 1.9 MG/DL
MAGNESIUM SERPL-MCNC: 1.9 MG/DL
MCH RBC QN AUTO: 34.1 PG
MCH RBC QN AUTO: 34.1 PG
MCHC RBC AUTO-ENTMCNC: 32.6 G/DL
MCHC RBC AUTO-ENTMCNC: 33.2 G/DL
MCV RBC AUTO: 103 FL
MCV RBC AUTO: 104 FL
MICROSCOPIC COMMENT: NORMAL
MONOCYTES # BLD AUTO: 1.3 K/UL
MONOCYTES # BLD AUTO: 1.6 K/UL
MONOCYTES NFR BLD: 10 %
MONOCYTES NFR BLD: 10.3 %
NEUTROPHILS # BLD AUTO: 11.9 K/UL
NEUTROPHILS # BLD AUTO: 9.4 K/UL
NEUTROPHILS NFR BLD: 75.5 %
NEUTROPHILS NFR BLD: 75.9 %
NITRITE UR QL STRIP: NEGATIVE
NRBC BLD-RTO: 0 /100 WBC
NRBC BLD-RTO: 0 /100 WBC
NUM UNITS TRANS FFP: NORMAL
NUM UNITS TRANS PACKED RBC: NORMAL
OVALOCYTES BLD QL SMEAR: ABNORMAL
PH UR STRIP: 6.5 [PH] (ref 5–8)
PHOSPHATE SERPL-MCNC: 4.6 MG/DL
PLATELET # BLD AUTO: 66 K/UL
PLATELET # BLD AUTO: 98 K/UL
PLATELET BLD QL SMEAR: ABNORMAL
PMV BLD AUTO: 11.8 FL
PMV BLD AUTO: 12.3 FL
POCT GLUCOSE: 100 MG/DL (ref 70–110)
POCT GLUCOSE: 86 MG/DL (ref 70–110)
POCT GLUCOSE: 87 MG/DL (ref 70–110)
POIKILOCYTOSIS BLD QL SMEAR: SLIGHT
POLYCHROMASIA BLD QL SMEAR: ABNORMAL
POTASSIUM SERPL-SCNC: 3.8 MMOL/L
POTASSIUM SERPL-SCNC: 4.4 MMOL/L
PROT SERPL-MCNC: 5.4 G/DL
PROT SERPL-MCNC: 5.5 G/DL
PROT UR QL STRIP: ABNORMAL
PROTHROMBIN TIME: 20.3 SEC
PROTHROMBIN TIME: 21.2 SEC
RBC # BLD AUTO: 3.05 M/UL
RBC # BLD AUTO: 3.2 M/UL
RBC #/AREA URNS AUTO: 2 /HPF (ref 0–4)
SODIUM SERPL-SCNC: 134 MMOL/L
SODIUM SERPL-SCNC: 138 MMOL/L
SP GR UR STRIP: 1.02 (ref 1–1.03)
SQUAMOUS #/AREA URNS AUTO: 1 /HPF
URN SPEC COLLECT METH UR: ABNORMAL
WBC # BLD AUTO: 12.42 K/UL
WBC # BLD AUTO: 15.63 K/UL
WBC #/AREA URNS AUTO: 2 /HPF (ref 0–5)

## 2018-11-09 PROCEDURE — 93010 ELECTROCARDIOGRAM REPORT: CPT | Mod: NTX,,, | Performed by: INTERNAL MEDICINE

## 2018-11-09 PROCEDURE — 87186 SC STD MICRODIL/AGAR DIL: CPT | Mod: NTX

## 2018-11-09 PROCEDURE — 63600175 PHARM REV CODE 636 W HCPCS: Mod: JG,NTX | Performed by: INTERNAL MEDICINE

## 2018-11-09 PROCEDURE — 85025 COMPLETE CBC W/AUTO DIFF WBC: CPT | Mod: NTX

## 2018-11-09 PROCEDURE — 87088 URINE BACTERIA CULTURE: CPT | Mod: NTX

## 2018-11-09 PROCEDURE — 83735 ASSAY OF MAGNESIUM: CPT | Mod: NTX

## 2018-11-09 PROCEDURE — 82248 BILIRUBIN DIRECT: CPT | Mod: NTX

## 2018-11-09 PROCEDURE — 20600001 HC STEP DOWN PRIVATE ROOM: Mod: NTX

## 2018-11-09 PROCEDURE — 36415 COLL VENOUS BLD VENIPUNCTURE: CPT | Mod: NTX

## 2018-11-09 PROCEDURE — 80053 COMPREHEN METABOLIC PANEL: CPT | Mod: 91,NTX

## 2018-11-09 PROCEDURE — 93005 ELECTROCARDIOGRAM TRACING: CPT | Mod: NTX

## 2018-11-09 PROCEDURE — 83735 ASSAY OF MAGNESIUM: CPT | Mod: 91,NTX

## 2018-11-09 PROCEDURE — 85730 THROMBOPLASTIN TIME PARTIAL: CPT | Mod: NTX

## 2018-11-09 PROCEDURE — 80053 COMPREHEN METABOLIC PANEL: CPT | Mod: NTX

## 2018-11-09 PROCEDURE — 25000003 PHARM REV CODE 250: Mod: NTX | Performed by: INTERNAL MEDICINE

## 2018-11-09 PROCEDURE — 85610 PROTHROMBIN TIME: CPT | Mod: NTX

## 2018-11-09 PROCEDURE — 93010 EKG 12-LEAD: ICD-10-PCS | Mod: NTX,,, | Performed by: INTERNAL MEDICINE

## 2018-11-09 PROCEDURE — 99233 SBSQ HOSP IP/OBS HIGH 50: CPT | Mod: NTX,,, | Performed by: NURSE PRACTITIONER

## 2018-11-09 PROCEDURE — 25000003 PHARM REV CODE 250: Mod: NTX | Performed by: HOSPITALIST

## 2018-11-09 PROCEDURE — 87086 URINE CULTURE/COLONY COUNT: CPT | Mod: NTX

## 2018-11-09 PROCEDURE — 90935 PR HEMODIALYSIS, ONE EVALUATION: ICD-10-PCS | Mod: NTX,,, | Performed by: INTERNAL MEDICINE

## 2018-11-09 PROCEDURE — 86920 COMPATIBILITY TEST SPIN: CPT | Mod: NTX

## 2018-11-09 PROCEDURE — 85610 PROTHROMBIN TIME: CPT | Mod: 91,NTX

## 2018-11-09 PROCEDURE — 84100 ASSAY OF PHOSPHORUS: CPT | Mod: NTX

## 2018-11-09 PROCEDURE — 81001 URINALYSIS AUTO W/SCOPE: CPT | Mod: NTX

## 2018-11-09 PROCEDURE — 83036 HEMOGLOBIN GLYCOSYLATED A1C: CPT | Mod: NTX

## 2018-11-09 PROCEDURE — 85384 FIBRINOGEN ACTIVITY: CPT | Mod: NTX

## 2018-11-09 PROCEDURE — P9047 ALBUMIN (HUMAN), 25%, 50ML: HCPCS | Mod: JG,NTX | Performed by: INTERNAL MEDICINE

## 2018-11-09 PROCEDURE — 90935 HEMODIALYSIS ONE EVALUATION: CPT | Mod: NTX,,, | Performed by: INTERNAL MEDICINE

## 2018-11-09 PROCEDURE — 86850 RBC ANTIBODY SCREEN: CPT | Mod: NTX

## 2018-11-09 PROCEDURE — 99233 PR SUBSEQUENT HOSPITAL CARE,LEVL III: ICD-10-PCS | Mod: NTX,,, | Performed by: NURSE PRACTITIONER

## 2018-11-09 PROCEDURE — 63600175 PHARM REV CODE 636 W HCPCS: Mod: NTX | Performed by: HOSPITALIST

## 2018-11-09 PROCEDURE — 82962 GLUCOSE BLOOD TEST: CPT | Performed by: TRANSPLANT SURGERY

## 2018-11-09 PROCEDURE — 87077 CULTURE AEROBIC IDENTIFY: CPT | Mod: NTX

## 2018-11-09 PROCEDURE — 90935 HEMODIALYSIS ONE EVALUATION: CPT | Mod: NTX

## 2018-11-09 RX ORDER — METHYLPREDNISOLONE SODIUM SUCCINATE 500 MG/8ML
500 INJECTION INTRAMUSCULAR; INTRAVENOUS
Status: ACTIVE | OUTPATIENT
Start: 2018-11-09 | End: 2018-11-10

## 2018-11-09 RX ORDER — HYDROCODONE BITARTRATE AND ACETAMINOPHEN 500; 5 MG/1; MG/1
TABLET ORAL
Status: DISCONTINUED | OUTPATIENT
Start: 2018-11-09 | End: 2018-11-11

## 2018-11-09 RX ORDER — CIPROFLOXACIN 2 MG/ML
400 INJECTION, SOLUTION INTRAVENOUS
Status: DISCONTINUED | OUTPATIENT
Start: 2018-11-09 | End: 2018-11-10

## 2018-11-09 RX ORDER — VANCOMYCIN HCL IN 5 % DEXTROSE 1.5G/250ML
1500 PLASTIC BAG, INJECTION (ML) INTRAVENOUS
Status: DISCONTINUED | OUTPATIENT
Start: 2018-11-09 | End: 2018-11-10

## 2018-11-09 RX ADMIN — Medication 100 MG: at 02:11

## 2018-11-09 RX ADMIN — RIFAXIMIN 550 MG: 550 TABLET ORAL at 08:11

## 2018-11-09 RX ADMIN — MIDODRINE HYDROCHLORIDE 15 MG: 5 TABLET ORAL at 08:11

## 2018-11-09 RX ADMIN — PANTOPRAZOLE SODIUM 40 MG: 40 TABLET, DELAYED RELEASE ORAL at 02:11

## 2018-11-09 RX ADMIN — THERA TABS 1 TABLET: TAB at 02:11

## 2018-11-09 RX ADMIN — LACTULOSE 10 G: 20 SOLUTION ORAL at 08:11

## 2018-11-09 RX ADMIN — HYDROXYZINE HYDROCHLORIDE 25 MG: 25 TABLET ORAL at 10:11

## 2018-11-09 RX ADMIN — TRIAMCINOLONE ACETONIDE: 1 CREAM TOPICAL at 08:11

## 2018-11-09 RX ADMIN — FLUCONAZOLE 100 MG: 100 TABLET ORAL at 02:11

## 2018-11-09 RX ADMIN — MIDODRINE HYDROCHLORIDE 15 MG: 5 TABLET ORAL at 02:11

## 2018-11-09 RX ADMIN — CIPROFLOXACIN HYDROCHLORIDE 250 MG: 250 TABLET, FILM COATED ORAL at 02:11

## 2018-11-09 RX ADMIN — FOLIC ACID 1 MG: 1 TABLET ORAL at 02:11

## 2018-11-09 RX ADMIN — HYDROXYZINE HYDROCHLORIDE 25 MG: 25 TABLET ORAL at 02:11

## 2018-11-09 RX ADMIN — TRIAMCINOLONE ACETONIDE: 1 CREAM TOPICAL at 02:11

## 2018-11-09 RX ADMIN — HEPARIN SODIUM 1000 UNITS: 1000 INJECTION, SOLUTION INTRAVENOUS; SUBCUTANEOUS at 12:11

## 2018-11-09 RX ADMIN — RIFAXIMIN 550 MG: 550 TABLET ORAL at 02:11

## 2018-11-09 RX ADMIN — SODIUM BICARBONATE 650 MG TABLET 650 MG: at 08:11

## 2018-11-09 RX ADMIN — SODIUM BICARBONATE 650 MG TABLET 650 MG: at 02:11

## 2018-11-09 RX ADMIN — ALBUMIN HUMAN 25 G: 0.25 SOLUTION INTRAVENOUS at 08:11

## 2018-11-09 RX ADMIN — SODIUM CHLORIDE: 0.9 INJECTION, SOLUTION INTRAVENOUS at 08:11

## 2018-11-09 NOTE — TELEPHONE ENCOUNTER
ORGAN OFFER NOTE    Notified by Dong eLvi, , of liver offer with donor information.  Donor and recipient information read back and verified.  Spoke with Femi Enciso and identified no acute medical issues during telephone assessment. call and report to   Patient verbalized understanding and willingness to come in for transplant , instructed patient to be NPO(he reported that is is already NPO.    Patient is inpatient, spoke to  and patient cleared for transplant.  Patient will need intraoperative dialysis, Surgery Resident  notified and to order dialysis.

## 2018-11-09 NOTE — ASSESSMENT & PLAN NOTE
- DEL over past 2 weeks.  Nephrology was consulted.  Pt not a candidate for kidney transplant before 12-4-18.    - pt tolerating HD w no fluid removed today given hypotension.    - pt w offer for liver transplant this evening.  He will receive intra-op HD.    - cont strict I/O's.

## 2018-11-09 NOTE — ASSESSMENT & PLAN NOTE
- morbilliform rash likely from drug reaction possibly Cefepime (10/27-10/30).  Per patient rash is very pruritic.  He has been using steroid cream w minimal relief.  He stated significant improvement w receiving Hydroxyzine.  Monitor.

## 2018-11-09 NOTE — ASSESSMENT & PLAN NOTE
Femi Enciso is a 53 y.o. male with alcoholic cirrhosis admitted for liver transplantation evaluation. Hospital course complicated by hepatorenal syndrome requiring dialysis and sepsis believed secondary to UTI. Current infectious work-up today otherwise unremarkable. Now with a ~1 week hx of ill-defined, evanescent and erythematous extremity rash. Dermatology consulted for evaluation.   Rash: favor drug induced rash due to cefepime. Patient with possible hx of penicillin allergy with recent 7 day course of cefepime (cross reacts w/ penicillins) with development of intermittent, itchy red rash. Drug rashes tend to occur 5-7 days after initiation of antibiotics. Ceftriaxone has been discontinued.  - leukocytosis resolved.  - Continue TAC 0.1% cream; apply to the affected sites BID.   - Continue hydroxyzine 25 mg po q8h prn; watch for increasing somnolence.

## 2018-11-09 NOTE — PROGRESS NOTES
Acute hemodialysis tx started via right chest perm cath, tolerated well, flows good. 0 UF to start tx due to hypotension, bp 90/53. Charge nurse Saad JAMESON is aware

## 2018-11-09 NOTE — ASSESSMENT & PLAN NOTE
- patient listed for liver transplant 11/8.  MELD on 11/9 42.  Pt w offer for liver transplant this evening.  1800 and 1930.

## 2018-11-09 NOTE — SUBJECTIVE & OBJECTIVE
Scheduled Meds:   sodium chloride 0.9%   Intravenous Once    ciprofloxacin HCl  250 mg Oral Daily    fluconazole  100 mg Oral Daily    folic acid  1 mg Oral Daily    lactulose  10 g Oral BID    lidocaine  1 patch Transdermal Q24H    lidocaine  1 patch Transdermal Q24H    methylPREDNISolone sodium succinate  500 mg Intravenous Once Pre-Op    midodrine  15 mg Oral TID    multivitamin  1 tablet Oral Daily    pantoprazole  40 mg Oral Daily    rifAXImin  550 mg Oral BID    sevelamer carbonate  800 mg Oral TID WM    sodium bicarbonate  650 mg Oral TID    thiamine  100 mg Oral Daily    triamcinolone acetonide 0.1%   Topical (Top) BID     Continuous Infusions:  PRN Meds:sodium chloride, sodium chloride, sodium chloride, sodium chloride 0.9%, acetaminophen, albumin human 25%, albuterol-ipratropium, vancomycin (VANCOCIN) IVPB **AND** ciprofloxacin, dextrose 50%, dextrose 50%, glucagon (human recombinant), glucose, glucose, heparin (porcine), hepatitis B virus vacc.rec(PF), hydrOXYzine HCl, insulin aspart U-100, ondansetron, promethazine (PHENERGAN) IVPB, promethazine (PHENERGAN) IVPB, ramelteon, sodium chloride 0.9%, sodium chloride 0.9%, traMADol    Review of Systems   Constitutional: Positive for activity change (decreased), appetite change (decreased) and fatigue. Negative for chills and fever.   HENT: Negative for congestion and trouble swallowing.    Eyes: Negative for visual disturbance.   Respiratory: Negative for cough, shortness of breath and wheezing.    Cardiovascular: Positive for leg swelling. Negative for chest pain.   Gastrointestinal: Positive for abdominal distention, abdominal pain (hiccups) and diarrhea (lactulose related). Negative for constipation, nausea and vomiting.   Endocrine: Negative.    Genitourinary: Negative for decreased urine volume, difficulty urinating, dysuria, hematuria and urgency.   Musculoskeletal: Negative for arthralgias.   Skin: Positive for rash (morbilliform rash on  torso and bilateral upper and lower extremities, improving ). Negative for color change, pallor and wound.   Allergic/Immunologic: Positive for immunocompromised state.   Neurological: Positive for weakness (generalized). Negative for dizziness, tremors, seizures, syncope and headaches.   Hematological: Bruises/bleeds easily.   Psychiatric/Behavioral: Negative for agitation, confusion, decreased concentration, sleep disturbance and suicidal ideas. The patient is not nervous/anxious.      Objective:     Vital Signs (Most Recent):  Temp: 98.3 °F (36.8 °C) (11/09/18 0755)  Pulse: 76 (11/09/18 1230)  Resp: 16 (11/09/18 0755)  BP: (!) 94/52 (11/09/18 1230)  SpO2: 97 % (11/09/18 0455) Vital Signs (24h Range):  Temp:  [97.7 °F (36.5 °C)-98.3 °F (36.8 °C)] 98.3 °F (36.8 °C)  Pulse:  [74-86] 76  Resp:  [16-18] 16  SpO2:  [93 %-98 %] 97 %  BP: ()/(45-59) 94/52     Weight: 89.7 kg (197 lb 12 oz)  Body mass index is 28.37 kg/m².    Intake/Output - Last 3 Shifts       11/07 0700 - 11/08 0659 11/08 0700 - 11/09 0659 11/09 0700 - 11/10 0659    P.O.  500     I.V. (mL/kg)       Blood 248.3      Other 650      Total Intake(mL/kg) 898.3 (9.1) 500 (5.6)     Urine (mL/kg/hr)  0 (0)     Other 1307      Stool  0     Total Output 1307 0     Net -408.7 +500            Urine Occurrence 1 x 1 x     Stool Occurrence 1 x 1 x           Physical Exam   Constitutional: He is oriented to person, place, and time. He appears well-developed.   (+) hand and temporal muscle wasting noted     HENT:   Head: Normocephalic and atraumatic.   Mouth/Throat: No oropharyngeal exudate.   Eyes: Pupils are equal, round, and reactive to light. Scleral icterus is present.   Neck: Normal range of motion. Neck supple. No thyromegaly present.   Cardiovascular: Normal rate and regular rhythm.   Pulmonary/Chest: Effort normal and breath sounds normal. No respiratory distress.   Abdominal: Soft. Bowel sounds are normal. He exhibits no distension. There is no  tenderness. There is no guarding.   Musculoskeletal: Normal range of motion. He exhibits edema (trace BLE edema).   Neurological: He is alert and oriented to person, place, and time.   Skin: Skin is warm and dry. Capillary refill takes 2 to 3 seconds. Rash (morbilliform rash on torso and Maximiliano upper and lower extremitied) noted. He is not diaphoretic.   Psychiatric: He has a normal mood and affect. His behavior is normal. Judgment and thought content normal.       Laboratory:  Immunosuppressants     None        CBC:   Recent Labs   Lab 11/09/18 0405   WBC 15.63*   RBC 3.20*   HGB 10.9*   HCT 33.4*   PLT 98*   *   MCH 34.1*   MCHC 32.6     CMP:   Recent Labs   Lab 11/09/18 0405   GLU 99   CALCIUM 8.5*   ALBUMIN 2.3*   PROT 5.4*   *   K 4.4   CO2 22*   CL 98   BUN 50*   CREATININE 7.2*   ALKPHOS 244*   ALT 29   AST 69*   BILITOT 37.5*     Coagulation:   Recent Labs   Lab 11/09/18 0405   INR 2.1*     Labs within the past 24 hours have been reviewed.    Diagnostic Results:  I have personally reviewed all pertinent imaging studies.

## 2018-11-09 NOTE — ASSESSMENT & PLAN NOTE
- minimal ascites noted on exam.  Small volume ascites noted on abdominal US 10/27/18.  Monitor.

## 2018-11-09 NOTE — PROGRESS NOTES
[facility]  Dermatology  Progress Note    Patient Name: Femi Enciso  MRN: [id number]  Admission Date: 10/27/2018  Hospital Length of Stay: 13 days  Attending Physician: Femi Patel MD  Primary Care Provider: Primary Doctor No     Subjective:     Principal Problem:Pre-transplant evaluation for liver transplant    Interval History:    He is feeling well today. He does not feel the TAC cream helps his rash but has been taking hydroxyzine which he feels has improved itching.     Medications:  Continuous Infusions:  Scheduled Meds:   sodium chloride 0.9%   Intravenous Once    ciprofloxacin HCl  250 mg Oral Daily    fluconazole  100 mg Oral Daily    folic acid  1 mg Oral Daily    lactulose  10 g Oral BID    lidocaine  1 patch Transdermal Q24H    lidocaine  1 patch Transdermal Q24H    methylPREDNISolone sodium succinate  500 mg Intravenous Once Pre-Op    midodrine  15 mg Oral TID    multivitamin  1 tablet Oral Daily    pantoprazole  40 mg Oral Daily    rifAXImin  550 mg Oral BID    sevelamer carbonate  800 mg Oral TID WM    sodium bicarbonate  650 mg Oral TID    thiamine  100 mg Oral Daily    triamcinolone acetonide 0.1%   Topical (Top) BID     PRN Meds:sodium chloride, sodium chloride, sodium chloride, sodium chloride 0.9%, acetaminophen, albumin human 25%, albuterol-ipratropium, vancomycin (VANCOCIN) IVPB **AND** ciprofloxacin, dextrose 50%, dextrose 50%, glucagon (human recombinant), glucose, glucose, heparin (porcine), hepatitis B virus vacc.rec(PF), hydrOXYzine HCl, insulin aspart U-100, ondansetron, promethazine (PHENERGAN) IVPB, promethazine (PHENERGAN) IVPB, ramelteon, sodium chloride 0.9%, sodium chloride 0.9%, traMADol    Review of Systems  Objective:     Vital Signs (Most Recent):  Temp: 98.2 °F (36.8 °C) (11/09/18 1235)  Pulse: 80 (11/09/18 1235)  Resp: 18 (11/09/18 1235)  BP: (!) 98/50 (11/09/18 1235)  SpO2: 97 % (11/09/18 0455) Vital Signs (24h Range):  Temp:  [97.7 °F  (36.5 °C)-98.3 °F (36.8 °C)] 98.2 °F (36.8 °C)  Pulse:  [74-86] 80  Resp:  [16-18] 18  SpO2:  [93 %-98 %] 97 %  BP: ()/(45-59) 98/50     Weight: 89.7 kg (197 lb 12 oz)  Body mass index is 28.37 kg/m².    Physical Exam   Constitutional: He appears well-developed and well-nourished.   Skin:   Areas Examined (abnormalities noted in diagram):   Head / Face Inspection Performed  Neck Inspection Performed  Chest / Axilla Inspection Performed  Abdomen Inspection Performed  Back Inspection Performed  RUE Inspected  LUE Inspection Performed  RLE Inspected  LLE Inspection Performed                   Significant Labs:   BMP:   Recent Labs   Lab 11/09/18  0405   GLU 99   *   K 4.4   CL 98   CO2 22*   BUN 50*   CREATININE 7.2*   CALCIUM 8.5*   MG 1.9     CBC:   Recent Labs   Lab 11/08/18  0429 11/09/18  0405 11/09/18  1351   WBC 15.85* 15.63* 12.42   HGB 10.7* 10.9* 10.4*   HCT 32.2* 33.4* 31.3*   PLT 90* 98* 66*       Significant Imaging: None    Assessment/Plan:     Acute maculopapular rash    Femi Enciso is a 53 y.o. male with alcoholic cirrhosis admitted for liver transplantation evaluation. Hospital course complicated by hepatorenal syndrome requiring dialysis and sepsis believed secondary to UTI. Current infectious work-up today otherwise unremarkable. Now with a ~1 week hx of ill-defined, evanescent and erythematous extremity rash. Dermatology consulted for evaluation.   Rash: favor drug induced rash due to cefepime. Patient with possible hx of penicillin allergy with recent 7 day course of cefepime (cross reacts w/ penicillins) with development of intermittent, itchy red rash. Drug rashes tend to occur 5-7 days after initiation of antibiotics. Ceftriaxone has been discontinued.  - leukocytosis resolved.  - Continue TAC 0.1% cream; apply to the affected sites BID.   - Continue hydroxyzine 25 mg po q8h prn; watch for increasing somnolence.                 Alissa Banuelos MD  Dermatology  [facility]

## 2018-11-09 NOTE — ASSESSMENT & PLAN NOTE
- temporal muscle wasting noted.  Decreased appetite and energy over past week worse.   Dietician consulted on admit.  Will consult post transplant as needed.

## 2018-11-09 NOTE — SIGNIFICANT EVENT
Pre-operative Discussion Note  Liver Transplant Surgery    Femi Enciso is a 53 y.o. male admitted for liver transplant.  I discussed the planned procedure in detail, including expected hospital course and outcomes, benefits, risks, and potential complications.  Complications discussed included death, graft failure, bleeding, infection, rejection, and neurologic problems.  I discussed the risks of anesthesia, as well as the potential need for re-operation.  The possibility of other complications not specifically mentioned was also discussed.  Also, I discussed the need for lifelong immunosuppression and the possibility of serious complications from immunosuppressive drugs.    The discussion included the risks that the patient will incur if he elects to not have the proposed procedure.    Relevant donor-specific risk factors were disclosed and discussed with the patient, including:   none    Specific PHS Increased Risk Behavior criteria for the organ donor include:  None    HCV Infection Not applicable.    Hepatocellular Carcinoma Recurrence: Not applicable.    All questions were answered.  The patient and available family members voice understanding and agree to proceed with the transplant.    Functional Status: 20% - Very sick, hospitalization necessary: active treatment necessary  Physical Capacity: Limited Mobility

## 2018-11-09 NOTE — PROGRESS NOTES
Admit Note     Met with wife to assess patients needs due to patient in dialysis.  Patient is a 53 y.o.  male, admitted for liver transplant.     Patient admitted from [facility] Rm #560 on 10/27/2018 .  At this time, patient was not in room.  At this time, patients caregiver presents as alert and oriented x 4, pleasant, good eye contact, well groomed, recall good, concentration/judgement good, average intelligence, calm, communicative, cooperative and asking and answering questions appropriately.      Household/Family Systems (as reported by patients caregiver)     Patient resides with patient's wife, at [address] [address].  Support system includes patient's spouse, Suzy, and family.  Patient does not have dependents that are need of being cared for.     Patients primary caregiver is Suzy Bedoya, patients wife, phone number [phone].  Confirmed patients contact information is [phone] (home);   Telephone Information:   Mobile [phone]       During admission, patient's caregiver plans to stay in patient's room.  Confirmed patient and patients caregivers do have access to reliable transportation.    Cognitive Status/Learning     Patients caregiver reports patients reading ability as college and states patient does have difficulty with seeing. Patient wears glasses.  Patients caregiver reports patient learns best by written information.   Needed: No.   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    Adherence     Patients caregiver reports patient has a high level of adherence to patients health care regimen.  Adherence counseling and education provided.  Patient's caregiver verbalizes understanding.    Substance Use    Patients  caregiver reports patients substance usage as the following:    Tobacco: none, patient denies any use.    Alcohol: none, patient denies any use. Patient's last ETOH use on 18. Liver diagnosis was received on 10/3/18 and patient discontinued ETOH use. Patient reported drinking at least a pint of Vodka a day for about 20 years. Patient completed IOP with [facility] five years ago. Patient remained sober; however, relapsed after 2.5 years. Patient also attended AA, but feels that it did not positively assist him. Patient open to completing IOP post transplant, but prefers to not attend AA.     Illicit Drugs/Non-prescribed Medications: none, patient denies any use.  Patients caregiver states clear understanding of the potential impact of substance use.  Substance abstinence/cessation counseling, education and resources provided and reviewed.     Services Utilizing/ADLS (as reported by patients caregiver)    Infusion Service: Prior to admission, patient utilizing? no  Home Health: Prior to admission, patient utilizing? no  DME: Prior to admission, no  Pulmonary/Cardiac Rehab: Prior to admission, no  Dialysis:  Prior to admission, no  Transplant Specialty Pharmacy:  Prior to admission, no.    Prior to admission, patients caregiver reports patient was independent with ADLS and was not driving.  Patients caregiver reports patient is able to care for self at this time..  Patients caregiver reports patient indicates a willingness to care for self once medically cleared to do so.    Insurance/Medications    Insured by   Payor/Plan Subscr  Sex Relation Sub. Ins. ID Effective Group Num   1. BLUE CROSS BL* FIDEL CARRENO [birth year]1964 Female  [id number] 1/10/16 113                                   [address] [address]      Primary Insurance (for UNOS reporting): Private Insurance-BCBS  Secondary Insurance (for UNOS reporting): None    Patients caregiver reports patient is able to obtain and afford  medications at this time and at time of discharge.    Living Will/Healthcare Power of     Patients caregiver reports patient does not have a LW and/or HCPA.   provided education regarding LW and HCPA and the completion of forms.    Coping/Mental Health (as reported by patients caregiver)    Per caregiver, patient is coping adequately with the aid of  family members and friends.  Patients caregiver is coping adequately with the aid of  family members and friends.      Discharge Planning (as reported by patients caregiver)    At time of discharge, patient plans to return to [facility] rm #560 under the care of Suzy Enciso, patient's spouse.  Patients wife will transport patient.  Per rounds today, expected discharge date has not been medically determined at this time. Patients caretaker verbalizes understanding and is involved in treatment planning and discharge process.    Additional Concerns    Patient's caretaker denies additional needs and/or concerns at this time. Patient is being followed for needs, education, resources, information, emotional support, supportive counseling, and for supportive and skilled discharge plan of care.  providing ongoing psychosocial support, education, resources and d/c planning as needed.  SW remains available.  provided resource list, patient choice, psychosocial and supportive counseling, resources, education, assistance and discharge planning with patient and caregiver involvement, ongoing SW availability and services as appropriate.  remains available. Patient's caregiver verbalizes understanding and agreement with information reviewed,  availability and how to access available resources as needed.

## 2018-11-09 NOTE — PLAN OF CARE
Problem: Patient Care Overview  Goal: Plan of Care Review  Outcome: Ongoing (interventions implemented as appropriate)  Poc reviewed at bedside. Pt listed today. Safety maintained. Call light in reach.

## 2018-11-09 NOTE — TELEPHONE ENCOUNTER
5:13 PM On Call Note: notified by Alma (procurement) that liver transplant being cancelled for this  patient due to donor quality.   Transplant team notified. And spoke to patient's nurse Mela as well.

## 2018-11-09 NOTE — ANESTHESIA PREPROCEDURE EVALUATION
[facility]  Anesthesia Pre-Operative Evaluation         Patient Name: Femi Enciso  YOB: 1965  MRN: [id number]    SUBJECTIVE:     Pre-operative evaluation for Procedure(s) (LRB):  TRANSPLANT, LIVER (N/A)     11/09/2018    Femi Enciso is a 53 y.o. male w/ a significant PMHx DM2, ESLD secondary to EtOH abuse in 09/2018, DEL progressing to ESRD. Recent hospitalization for ALI in [place], while he also got hypoxic, CT chest showed large pleural effusion s/p thoracentesis (negative for malignancy) and he was treated for PNA and C.diff as well. [facility] [place] for liver OTHx evaluation. He has been on RRT on this admission [facility]. Last drink was 9/21/2018. Denies any fever or chills or chest pain or abdominal pain. Afebrile, slightly hypotensive (on midodrine). Satting 95+% on room air.    Patient now presents for the above procedure(s).      LDA:       Tunneled Central Line Insertion/Assessment - Double Lumen  11/07/18 1350 right internal jugular (Active)   Number of days: 2            Peripheral IV - Single Lumen 11/01/18 1631 Left Wrist (Active)   Number of days: 7       Prev airway: None documented.    Drips: None documented.      Patient Active Problem List   Diagnosis    Acute liver failure without hepatic coma    Alcoholic hepatitis with ascites    Acute liver failure    Jaundice    Hepatorenal syndrome    DEL (acute kidney injury)    Acute kidney failure with lesion of tubular necrosis    Severe alcohol dependence    Coagulopathy    Anemia of chronic disease    Thrombocytopenia    Hyponatremia    Hepatic encephalopathy    Metabolic acidosis    Moderate protein malnutrition    Type 2 diabetes mellitus without complication    Acute cystitis without hematuria    Pleural effusion associated with hepatic disorder    Decompensated hepatic cirrhosis    Alcohol use disorder, severe, in early remission    Pre-transplant evaluation for liver transplant     Encounter for pre-transplant evaluation for kidney transplant    Acute maculopapular rash    End stage liver disease    Weakness       Review of patient's allergies indicates:   Allergen Reactions    Cefepime Rash    Penicillins Nausea And Vomiting and Rash     Full body rash from cefepime as well       Current Inpatient Medications:   sodium chloride 0.9%   Intravenous Once    ciprofloxacin HCl  250 mg Oral Daily    fluconazole  100 mg Oral Daily    folic acid  1 mg Oral Daily    lactulose  10 g Oral BID    lidocaine  1 patch Transdermal Q24H    lidocaine  1 patch Transdermal Q24H    methylPREDNISolone sodium succinate  500 mg Intravenous Once Pre-Op    midodrine  15 mg Oral TID    multivitamin  1 tablet Oral Daily    pantoprazole  40 mg Oral Daily    rifAXImin  550 mg Oral BID    sevelamer carbonate  800 mg Oral TID WM    sodium bicarbonate  650 mg Oral TID    thiamine  100 mg Oral Daily    triamcinolone acetonide 0.1%   Topical (Top) BID       No current facility-administered medications on file prior to encounter.      Current Outpatient Medications on File Prior to Encounter   Medication Sig Dispense Refill    calcium carbonate/vitamin D3 (VITAMIN D-3 ORAL) Take 1 tablet by mouth once daily.      cyanocobalamin (VITAMIN B-12) 100 MCG tablet Take 100 mcg by mouth once daily.      folic acid (FOLVITE) 1 MG tablet Take 1 mg by mouth once daily.      lactulose (CHRONULAC) 10 gram/15 mL solution Take 20 g by mouth 3 (three) times daily.      multivitamin (THERAGRAN) per tablet Take 1 tablet by mouth once daily.      omeprazole (PRILOSEC) 40 MG capsule Take 40 mg by mouth once daily.      ondansetron (ZOFRAN) 4 MG tablet Take 4 mg by mouth daily as needed for Nausea.      rifAXIMin (XIFAXAN) 550 mg Tab Take 550 mg by mouth 2 (two) times daily.         Past Surgical History:   Procedure Laterality Date    EGD (ESOPHAGOGASTRODUODENOSCOPY) N/A 11/6/2018    Performed by Duarte Jules MD at  [facility] ENDO (2ND FLR)    ESOPHAGOGASTRODUODENOSCOPY N/A 11/6/2018    Procedure: EGD (ESOPHAGOGASTRODUODENOSCOPY);  Surgeon: Duarte Jules MD;  Location: [facility] ENDO (2ND FLR);  Service: Endoscopy;  Laterality: N/A;    INSERTION OF TUNNELED CENTRAL VENOUS HEMODIALYSIS CATHETER N/A 11/7/2018    Procedure: INSERTION, CATHETER, CENTRAL VENOUS, HEMODIALYSIS, TUNNELED;  Surgeon: Brock Gonzalez MD;  Location: [facility] CATH LAB;  Service: Nephrology;  Laterality: N/A;    INSERTION, CATHETER, CENTRAL VENOUS, HEMODIALYSIS, TUNNELED N/A 11/7/2018    Performed by Brock Gonzalez MD at [facility] CATH LAB       Social History     Socioeconomic History    Marital status:      Spouse name: Not on file    Number of children: Not on file    Years of education: Not on file    Highest education level: Not on file   Social Needs    Financial resource strain: Not on file    Food insecurity - worry: Not on file    Food insecurity - inability: Not on file    Transportation needs - medical: Not on file    Transportation needs - non-medical: Not on file   Occupational History    Not on file   Tobacco Use    Smoking status: Never Smoker   Substance and Sexual Activity    Alcohol use: Not on file    Drug use: Not on file    Sexual activity: Not on file   Other Topics Concern    Not on file   Social History Narrative    Not on file       OBJECTIVE:     Vital Signs Range (Last 24H):  Temp:  [36.5 °C (97.7 °F)-36.8 °C (98.3 °F)]   Pulse:  [74-86]   Resp:  [16-18]   BP: ()/(45-59)   SpO2:  [93 %-98 %]       Significant Labs:  Lab Results   Component Value Date    WBC 12.42 11/09/2018    HGB 10.4 (L) 11/09/2018    HCT 31.3 (L) 11/09/2018    PLT 66 (L) 11/09/2018    ALT 29 11/09/2018    AST 69 (H) 11/09/2018     (L) 11/09/2018    K 4.4 11/09/2018    CL 98 11/09/2018    CREATININE 7.2 (H) 11/09/2018    BUN 50 (H) 11/09/2018    CO2 22 (L) 11/09/2018    PSA 0.39 11/02/2018    INR 2.2 (H) 11/09/2018    HGBA1C 4.8  10/27/2018       Diagnostic Studies:   DSE (11/02/2018)  CONCLUSIONS     1 - Normal left ventricular systolic function (EF 60-65%).     2 - No wall motion abnormalities.     3 - Normal left ventricular diastolic function.     4 - Normal right ventricular systolic function .     5 - Aortic valve sclerosis with mildly restrictive leaflet motion without significant stenosis.    CXR (11/7/2018)  Right jugular origin vascular catheter again seen, its tip near the junction of the superior vena cava and right atrium.  Heart size is normal, as is the appearance of the pulmonary vascularity, with the cardiomediastinal silhouette unchanged since the prior study referenced above.  Continued minor subsegmental atelectasis in the right lower lung zone, but the lung zones are stable and free of significant superimposed airspace consolidation or volume loss.  No pleural fluid of any substantial volume is noted on either side.  No pneumothorax.    EKG (11/8/2018):  Vent. Rate : 081 BPM     Atrial Rate : 081 BPM     P-R Int : 138 ms          QRS Dur : 112 ms      QT Int : 412 ms       P-R-T Axes : 023 -15 009 degrees     QTc Int : 478 ms    Normal sinus rhythm  Voltage criteria for left ventricular hypertrophy  Abnormal ECG  When compared with ECG of 02-NOV-2018 14:48,  No significant change was found    2D ECHO:  Results for orders placed or performed during the hospital encounter of 10/27/18   2D echo with color flow doppler   Result Value Ref Range    Calculated EF 73 55 - 65    Diastolic Dysfunction No     Est. PA Systolic Pressure 32.16     Tricuspid Valve Regurgitation TRIVIAL      CONCLUSIONS     1 - Hyperdynamic left ventricular systolic function (EF >70%).     2 - Normal right ventricular systolic function .     3 - Normal left ventricular diastolic function.     4 - Mild left atrial enlargement.     5 - The estimated PA systolic pressure is 32 mmHg.     ASSESSMENT/PLAN:         Anesthesia Evaluation    I have reviewed the  Patient Summary Reports.    I have reviewed the Nursing Notes.   I have reviewed the Medications.     Review of Systems  Anesthesia Hx:  No previous Anesthesia  Neg history of prior surgery.  Denies Personal Hx of Anesthesia complications.   Social:  Non-Smoker, Alcohol Use    Hematology/Oncology:         -- Anemia:   Cardiovascular:   Hypertension hyperlipidemia ECG has been reviewed.    Pulmonary:   Shortness of breath (2/2 plueral effusion (drained)) Has pleural effusion   Renal/:   Chronic Renal Disease, Dialysis    Hepatic/GI:   Liver Disease, Hepatitis    Endocrine:   Diabetes    Psych:   Psychiatric History          Physical Exam  General:  Well nourished    Airway/Jaw/Neck:  Airway Findings: Mouth Opening: Normal Tongue: Normal  Mallampati: I  Improves to I with phonation.  TM Distance: Normal, at least 6 cm      Dental:  Dental Findings: In tact   Chest/Lungs:  Chest/Lungs Findings: Clear to auscultation, Normal Respiratory Rate     Heart/Vascular:  Heart Findings: Rate: Normal  Rhythm: Regular Rhythm  Sounds: Normal     Abdomen:  Abdomen Findings: Normal    Musculoskeletal:  Musculoskeletal Findings: Normal   Skin:  Skin Findings: Normal    Mental Status:  Mental Status Findings:  Cooperative, Alert and Oriented         Anesthesia Plan  Type of Anesthesia, risks & benefits discussed:  Anesthesia Type:  general  Patient's Preference:   Intra-op Monitoring Plan: arterial line, central line, standard ASA monitors and cardiac output  Intra-op Monitoring Plan Comments:   Post Op Pain Control Plan: multimodal analgesia, IV/PO Opioids PRN and per primary service following discharge from PACU  Post Op Pain Control Plan Comments:   Induction:   IV  Beta Blocker:  Patient is not currently on a Beta-Blocker (No further documentation required).       Informed Consent: Patient understands risks and agrees with Anesthesia plan.  Questions answered. Anesthesia consent signed with patient.  ASA Score: 4     Day of  Surgery Review of History & Physical:            Ready For Surgery From Anesthesia Perspective.

## 2018-11-09 NOTE — ASSESSMENT & PLAN NOTE
- clarita past 2 weeks.  Pt on Midodrine.  Last HD 11/9/18- 0 fluid removed 2/2 hypotension.  - pt will receive intra op HD.

## 2018-11-09 NOTE — SUBJECTIVE & OBJECTIVE
Subjective:     Principal Problem:Pre-transplant evaluation for liver transplant    Interval History:    He is feeling well today. He does not feel the TAC cream helps his rash but has been taking hydroxyzine which he feels has improved itching.     Medications:  Continuous Infusions:  Scheduled Meds:   sodium chloride 0.9%   Intravenous Once    ciprofloxacin HCl  250 mg Oral Daily    fluconazole  100 mg Oral Daily    folic acid  1 mg Oral Daily    lactulose  10 g Oral BID    lidocaine  1 patch Transdermal Q24H    lidocaine  1 patch Transdermal Q24H    methylPREDNISolone sodium succinate  500 mg Intravenous Once Pre-Op    midodrine  15 mg Oral TID    multivitamin  1 tablet Oral Daily    pantoprazole  40 mg Oral Daily    rifAXImin  550 mg Oral BID    sevelamer carbonate  800 mg Oral TID WM    sodium bicarbonate  650 mg Oral TID    thiamine  100 mg Oral Daily    triamcinolone acetonide 0.1%   Topical (Top) BID     PRN Meds:sodium chloride, sodium chloride, sodium chloride, sodium chloride 0.9%, acetaminophen, albumin human 25%, albuterol-ipratropium, vancomycin (VANCOCIN) IVPB **AND** ciprofloxacin, dextrose 50%, dextrose 50%, glucagon (human recombinant), glucose, glucose, heparin (porcine), hepatitis B virus vacc.rec(PF), hydrOXYzine HCl, insulin aspart U-100, ondansetron, promethazine (PHENERGAN) IVPB, promethazine (PHENERGAN) IVPB, ramelteon, sodium chloride 0.9%, sodium chloride 0.9%, traMADol    Review of Systems  Objective:     Vital Signs (Most Recent):  Temp: 98.2 °F (36.8 °C) (11/09/18 1235)  Pulse: 80 (11/09/18 1235)  Resp: 18 (11/09/18 1235)  BP: (!) 98/50 (11/09/18 1235)  SpO2: 97 % (11/09/18 0455) Vital Signs (24h Range):  Temp:  [97.7 °F (36.5 °C)-98.3 °F (36.8 °C)] 98.2 °F (36.8 °C)  Pulse:  [74-86] 80  Resp:  [16-18] 18  SpO2:  [93 %-98 %] 97 %  BP: ()/(45-59) 98/50     Weight: 89.7 kg (197 lb 12 oz)  Body mass index is 28.37 kg/m².    Physical Exam   Constitutional: He appears  well-developed and well-nourished.   Skin:   Areas Examined (abnormalities noted in diagram):   Head / Face Inspection Performed  Neck Inspection Performed  Chest / Axilla Inspection Performed  Abdomen Inspection Performed  Back Inspection Performed  RUE Inspected  LUE Inspection Performed  RLE Inspected  LLE Inspection Performed                   Significant Labs:   BMP:   Recent Labs   Lab 11/09/18  0405   GLU 99   *   K 4.4   CL 98   CO2 22*   BUN 50*   CREATININE 7.2*   CALCIUM 8.5*   MG 1.9     CBC:   Recent Labs   Lab 11/08/18  0429 11/09/18  0405 11/09/18  1351   WBC 15.85* 15.63* 12.42   HGB 10.7* 10.9* 10.4*   HCT 32.2* 33.4* 31.3*   PLT 90* 98* 66*       Significant Imaging: None

## 2018-11-09 NOTE — PT/OT/SLP PROGRESS
Physical Therapy      Patient Name:  Femi Enciso   MRN:  [id number]    Patient not seen today secondary to pt away at dialysis x 2 attempts. Will follow-up next scheduled treatment per PT POC  .    Petar Headley, PTA  11/9/2018

## 2018-11-09 NOTE — PROGRESS NOTES
"[facility]  Liver Transplant  Progress Note    Patient Name: Femi Enciso  MRN: [id number]  Admission Date: 10/27/2018  Hospital Length of Stay: 13 days  Code Status: Full Code  Primary Care Provider: Primary Doctor No  Post-Operative Day:     ORGAN:   LIVER  Disease Etiology: Alcoholic Cirrhosis  Donor Type:    - Brain Death  CDC High Risk:   No  Donor CMV Status:   Donor CMV Status: Positive  Donor HBcAB:   Negative  Donor HCV Status:   Negative  Donor HBV JAVIER: Not Done  Donor HCV JAVIER: Negative  Whole or Partial:   Biliary Anastomosis:   Arterial Anatomy:   Subjective:     History of Present Illness:  MELD-Na score: 42 at 2018  4:52 AM  MELD score: 42 at 2018  4:52 AM  Calculated from:  Serum Creatinine: 6.8 mg/dL (Rounded to 4 mg/dL) at 2018  4:52 AM  Serum Sodium: 133 mmol/L at 2018  4:52 AM  Total Bilirubin: 36.9 mg/dL at 2018  4:52 AM  INR(ratio): 2.2 at 2018  4:52 AM  Age: 53 years     Estimated body mass index is 25.78 kg/m² as calculated from the following:    Height as of this encounter: 5' 10" (1.778 m).    Weight as of this encounter: 81.5 kg (179 lb 10.8 oz).     52 y/o man with DM2, ESLD secondary to EtOH abuse in 2018, DEL progressing to ESRD admitted to the medicine team for transplant evaluation. Of note, he was previous in fair health until in September, N/V and diarrhea after sanwitch, resolved, then found have increasing jaundice. Went to hospital, found to have ALI, while he also got hypoxic, CT chest showed large pleural effusion, he was admitted, underwent thoracentesis(removed about 1L), cell study negative for malignancy, and he was treated for PNA and C.diff as well. However, he continue to have weight loss, and start to have AMS, readmitted to hospital, found to have DEL despite ALI. He has been on RRT on this admission to [facility].Last drink was 2018. Denies any fever or chills or chest pain or abdominal pain.         Hospital " Course:  Interval History:  No acute events overnight.  BP low/stable on Midodrine 15 mg tid.   He is tolerating HD. No fluid removed today in HD given hypotension (80/53).  Patient w DEL for past 2 weeks, not a candidate for kidney transplant before 12/4/18.  He is listed for liver transplant w MELD of 42 today- T bili 37.5/Cr 7.2/INR 2.1.   Pt is AOx4.  No encephalopathy or asterisks noted on assessment.  He is having 3-4 BMs on Lactulose 15 mg every 6 hours.  Morbilliform rash on torso and bilateral upper and lower extremities noted- improving per patient.  Pruritis managed w Hydroxyzine.  Patient has offer for transplant.  He has been NPO since this am.  OR times 1800 and 1930.  Pt notified by liver transplant coordinator, Clarissa Wagner.           Scheduled Meds:   sodium chloride 0.9%   Intravenous Once    ciprofloxacin HCl  250 mg Oral Daily    fluconazole  100 mg Oral Daily    folic acid  1 mg Oral Daily    lactulose  10 g Oral BID    lidocaine  1 patch Transdermal Q24H    lidocaine  1 patch Transdermal Q24H    methylPREDNISolone sodium succinate  500 mg Intravenous Once Pre-Op    midodrine  15 mg Oral TID    multivitamin  1 tablet Oral Daily    pantoprazole  40 mg Oral Daily    rifAXImin  550 mg Oral BID    sevelamer carbonate  800 mg Oral TID WM    sodium bicarbonate  650 mg Oral TID    thiamine  100 mg Oral Daily    triamcinolone acetonide 0.1%   Topical (Top) BID     Continuous Infusions:  PRN Meds:sodium chloride, sodium chloride, sodium chloride, sodium chloride 0.9%, acetaminophen, albumin human 25%, albuterol-ipratropium, vancomycin (VANCOCIN) IVPB **AND** ciprofloxacin, dextrose 50%, dextrose 50%, glucagon (human recombinant), glucose, glucose, heparin (porcine), hepatitis B virus vacc.rec(PF), hydrOXYzine HCl, insulin aspart U-100, ondansetron, promethazine (PHENERGAN) IVPB, promethazine (PHENERGAN) IVPB, ramelteon, sodium chloride 0.9%, sodium chloride 0.9%, traMADol    Review of  Systems   Constitutional: Positive for activity change (decreased), appetite change (decreased) and fatigue. Negative for chills and fever.   HENT: Negative for congestion and trouble swallowing.    Eyes: Negative for visual disturbance.   Respiratory: Negative for cough, shortness of breath and wheezing.    Cardiovascular: Positive for leg swelling. Negative for chest pain.   Gastrointestinal: Positive for abdominal distention, abdominal pain (hiccups) and diarrhea (lactulose related). Negative for constipation, nausea and vomiting.   Endocrine: Negative.    Genitourinary: Negative for decreased urine volume, difficulty urinating, dysuria, hematuria and urgency.   Musculoskeletal: Negative for arthralgias.   Skin: Positive for rash (morbilliform rash on torso and bilateral upper and lower extremities, improving ). Negative for color change, pallor and wound.   Allergic/Immunologic: Positive for immunocompromised state.   Neurological: Positive for weakness (generalized). Negative for dizziness, tremors, seizures, syncope and headaches.   Hematological: Bruises/bleeds easily.   Psychiatric/Behavioral: Negative for agitation, confusion, decreased concentration, sleep disturbance and suicidal ideas. The patient is not nervous/anxious.      Objective:     Vital Signs (Most Recent):  Temp: 98.3 °F (36.8 °C) (11/09/18 0755)  Pulse: 76 (11/09/18 1230)  Resp: 16 (11/09/18 0755)  BP: (!) 94/52 (11/09/18 1230)  SpO2: 97 % (11/09/18 0455) Vital Signs (24h Range):  Temp:  [97.7 °F (36.5 °C)-98.3 °F (36.8 °C)] 98.3 °F (36.8 °C)  Pulse:  [74-86] 76  Resp:  [16-18] 16  SpO2:  [93 %-98 %] 97 %  BP: ()/(45-59) 94/52     Weight: 89.7 kg (197 lb 12 oz)  Body mass index is 28.37 kg/m².    Intake/Output - Last 3 Shifts       11/07 0700 - 11/08 0659 11/08 0700 - 11/09 0659 11/09 0700 - 11/10 0659    P.O.  500     I.V. (mL/kg)       Blood 248.3      Other 650      Total Intake(mL/kg) 898.3 (9.1) 500 (5.6)     Urine (mL/kg/hr)  0  (0)     Other 1307      Stool  0     Total Output 1307 0     Net -408.7 +500            Urine Occurrence 1 x 1 x     Stool Occurrence 1 x 1 x           Physical Exam   Constitutional: He is oriented to person, place, and time. He appears well-developed.   (+) hand and temporal muscle wasting noted     HENT:   Head: Normocephalic and atraumatic.   Mouth/Throat: No oropharyngeal exudate.   Eyes: Pupils are equal, round, and reactive to light. Scleral icterus is present.   Neck: Normal range of motion. Neck supple. No thyromegaly present.   Cardiovascular: Normal rate and regular rhythm.   Pulmonary/Chest: Effort normal and breath sounds normal. No respiratory distress.   Abdominal: Soft. Bowel sounds are normal. He exhibits no distension. There is no tenderness. There is no guarding.   Musculoskeletal: Normal range of motion. He exhibits edema (trace BLE edema).   Neurological: He is alert and oriented to person, place, and time.   Skin: Skin is warm and dry. Capillary refill takes 2 to 3 seconds. Rash (morbilliform rash on torso and Maximiliano upper and lower extremitied) noted. He is not diaphoretic.   Psychiatric: He has a normal mood and affect. His behavior is normal. Judgment and thought content normal.       Laboratory:  Immunosuppressants     None        CBC:   Recent Labs   Lab 11/09/18  0405   WBC 15.63*   RBC 3.20*   HGB 10.9*   HCT 33.4*   PLT 98*   *   MCH 34.1*   MCHC 32.6     CMP:   Recent Labs   Lab 11/09/18  0405   GLU 99   CALCIUM 8.5*   ALBUMIN 2.3*   PROT 5.4*   *   K 4.4   CO2 22*   CL 98   BUN 50*   CREATININE 7.2*   ALKPHOS 244*   ALT 29   AST 69*   BILITOT 37.5*     Coagulation:   Recent Labs   Lab 11/09/18  0405   INR 2.1*     Labs within the past 24 hours have been reviewed.    Diagnostic Results:  I have personally reviewed all pertinent imaging studies.    Assessment/Plan:     * Pre-transplant evaluation for liver transplant      52 y/o man with DM2, ESLD secondary to EtOH abuse in  09/2018, DEL progressing to ESRD requiring RRT. He has had worsening renal function since September 2018. His last drink was in 09/2018. He has had no surgical procedures on abdomen and has been mobilizing well till admission. He may qualify as SLK candidate in view of renal function deterioration over 6 weeks. He needs Transplant nephrology opinion about candidacy for SLK transplant. His cardiac evaluation needs to be updated and cross sectional imaging is needed prior to activation. He is  Surgically acceptable & SC risk A candidate on surgical evaluation. Addiction Psych consult shall be made during this admission.     Acute maculopapular rash    - morbilliform rash likely from drug reaction possibly Cefepime (10/27-10/30).  Per patient rash is very pruritic.  He has been using steroid cream w minimal relief.  He stated significant improvement w receiving Hydroxyzine.  Monitor.         Moderate protein malnutrition    - temporal muscle wasting noted.  Decreased appetite and energy over past week worse.   Dietician consulted on admit.  Will consult post transplant as needed.         Metabolic acidosis    - well managed on sodium bicarb.  Trend daily.        Hepatic encephalopathy    - well managed on Rifaximin and lactulose 10 g bid.  Monitor.       Coagulopathy    - to improve w transplant.  No evidence of acute bleeding, no hematemesis or BRBPR.         DEL (acute kidney injury)    - DEL over past 2 weeks.  Nephrology was consulted.  Pt not a candidate for kidney transplant before 12-4-18.    - pt tolerating HD w no fluid removed today given hypotension.    - pt w offer for liver transplant this evening.  He will receive intra-op HD.    - cont strict I/O's.         Hepatorenal syndrome    - del past 2 weeks.  Pt on Midodrine.  Last HD 11/9/18- 0 fluid removed 2/2 hypotension.  - pt will receive intra op HD.         Jaundice    - t bili 37.5.  Monitor.       Alcoholic hepatitis with ascites    - minimal ascites  noted on exam.  Small volume ascites noted on abdominal US 10/27/18.  Monitor.        Acute liver failure without hepatic coma    - patient listed for liver transplant 11/8.  MELD on 11/9 42.  Pt w offer for liver transplant this evening.  1800 and 1930.            VTE Risk Mitigation (From admission, onward)        Ordered     IP VTE HIGH RISK PATIENT  Once      11/09/18 1236     Send SCD stockings and NI hose with patient to OR  Continuous      11/09/18 1236     heparin (porcine) injection 1,000 Units  As needed (PRN)      11/07/18 1729     Place NI hose  Until discontinued      10/27/18 1636          The patients clinical status was discussed at multidisplinary rounds, involving transplant surgery, transplant medicine, pharmacy, nursing, nutrition, and social work    Discharge Planning:  No plan for discharge.  Patient with offer for liver transplant this evening.      Hilary Galarza, NP  Liver Transplant  [facility]

## 2018-11-09 NOTE — PLAN OF CARE
Problem: Patient Care Overview  Goal: Plan of Care Review  Outcome: Ongoing (interventions implemented as appropriate)  No significant events or changes overnight, vitals have been stable, pt continues to report red/itchy skin rash allover arms,legs and abdomen for which prescription cream was applied and pt also received prn hyroxyzine x1, reported moderate relief with this medication; dialysis nurse called for report and pt is anticipating an early morning dialysis session, waiting to be transported at this time with no further needs expressed, will continue to monitor.

## 2018-11-09 NOTE — PROGRESS NOTES
Hemodialysis tx complete. 900ml removed in 3.5 hour tx, tolerated well. Blood returned via right chest perm cath, both lumens hep locked, capped and taped. Report given to Mela JAMESON

## 2018-11-10 ENCOUNTER — TELEPHONE (OUTPATIENT)
Dept: TRANSPLANT | Facility: HOSPITAL | Age: 53
End: 2018-11-10

## 2018-11-10 ENCOUNTER — TELEPHONE (OUTPATIENT)
Dept: TRANSPLANT | Facility: CLINIC | Age: 53
End: 2018-11-10

## 2018-11-10 LAB
ALBUMIN SERPL BCP-MCNC: 2.3 G/DL
ALP SERPL-CCNC: 238 U/L
ALT SERPL W/O P-5'-P-CCNC: 28 U/L
ANION GAP SERPL CALC-SCNC: 12 MMOL/L
AST SERPL-CCNC: 80 U/L
BACTERIA BLD CULT: NORMAL
BACTERIA SPEC AEROBE CULT: NO GROWTH
BASOPHILS # BLD AUTO: 0.12 K/UL
BASOPHILS # BLD AUTO: 0.13 K/UL
BASOPHILS NFR BLD: 0.8 %
BASOPHILS NFR BLD: 0.9 %
BILIRUB SERPL-MCNC: 37.8 MG/DL
BUN SERPL-MCNC: 28 MG/DL
CALCIUM SERPL-MCNC: 8.5 MG/DL
CHLORIDE SERPL-SCNC: 103 MMOL/L
CO2 SERPL-SCNC: 23 MMOL/L
CREAT SERPL-MCNC: 5.1 MG/DL
DIFFERENTIAL METHOD: ABNORMAL
DIFFERENTIAL METHOD: ABNORMAL
EOSINOPHIL # BLD AUTO: 0.6 K/UL
EOSINOPHIL # BLD AUTO: 0.6 K/UL
EOSINOPHIL NFR BLD: 3.8 %
EOSINOPHIL NFR BLD: 3.9 %
ERYTHROCYTE [DISTWIDTH] IN BLOOD BY AUTOMATED COUNT: 18.2 %
ERYTHROCYTE [DISTWIDTH] IN BLOOD BY AUTOMATED COUNT: 18.4 %
EST. GFR  (AFRICAN AMERICAN): 13.8 ML/MIN/1.73 M^2
EST. GFR  (NON AFRICAN AMERICAN): 11.9 ML/MIN/1.73 M^2
GLUCOSE SERPL-MCNC: 86 MG/DL
HCT VFR BLD AUTO: 29.7 %
HCT VFR BLD AUTO: 30.7 %
HGB BLD-MCNC: 10.1 G/DL
HGB BLD-MCNC: 10.4 G/DL
IMM GRANULOCYTES # BLD AUTO: 0.13 K/UL
IMM GRANULOCYTES # BLD AUTO: 0.15 K/UL
IMM GRANULOCYTES NFR BLD AUTO: 0.9 %
IMM GRANULOCYTES NFR BLD AUTO: 1 %
INR PPP: 2.2
LYMPHOCYTES # BLD AUTO: 1.2 K/UL
LYMPHOCYTES # BLD AUTO: 1.3 K/UL
LYMPHOCYTES NFR BLD: 8.6 %
LYMPHOCYTES NFR BLD: 8.7 %
MAGNESIUM SERPL-MCNC: 1.8 MG/DL
MCH RBC QN AUTO: 34.1 PG
MCH RBC QN AUTO: 34.1 PG
MCHC RBC AUTO-ENTMCNC: 33.9 G/DL
MCHC RBC AUTO-ENTMCNC: 34 G/DL
MCV RBC AUTO: 100 FL
MCV RBC AUTO: 101 FL
MONOCYTES # BLD AUTO: 1.5 K/UL
MONOCYTES # BLD AUTO: 1.6 K/UL
MONOCYTES NFR BLD: 10.6 %
MONOCYTES NFR BLD: 10.7 %
NEUTROPHILS # BLD AUTO: 10.8 K/UL
NEUTROPHILS # BLD AUTO: 11.1 K/UL
NEUTROPHILS NFR BLD: 74.9 %
NEUTROPHILS NFR BLD: 75.2 %
NRBC BLD-RTO: 0 /100 WBC
NRBC BLD-RTO: 0 /100 WBC
PHOSPHATE SERPL-MCNC: 4 MG/DL
PLATELET # BLD AUTO: 78 K/UL
PLATELET # BLD AUTO: 82 K/UL
PMV BLD AUTO: 11.9 FL
PMV BLD AUTO: 12 FL
POCT GLUCOSE: 115 MG/DL (ref 70–110)
POCT GLUCOSE: 87 MG/DL (ref 70–110)
POCT GLUCOSE: 94 MG/DL (ref 70–110)
POCT GLUCOSE: 99 MG/DL (ref 70–110)
POTASSIUM SERPL-SCNC: 5 MMOL/L
PROT SERPL-MCNC: 5.1 G/DL
PROTHROMBIN TIME: 21.1 SEC
RBC # BLD AUTO: 2.96 M/UL
RBC # BLD AUTO: 3.05 M/UL
SODIUM SERPL-SCNC: 138 MMOL/L
VANCOMYCIN SERPL-MCNC: 11.4 UG/ML
WBC # BLD AUTO: 14.44 K/UL
WBC # BLD AUTO: 14.68 K/UL

## 2018-11-10 PROCEDURE — 85025 COMPLETE CBC W/AUTO DIFF WBC: CPT | Mod: 91,NTX

## 2018-11-10 PROCEDURE — 83735 ASSAY OF MAGNESIUM: CPT | Mod: 91,NTX

## 2018-11-10 PROCEDURE — 85610 PROTHROMBIN TIME: CPT | Mod: NTX

## 2018-11-10 PROCEDURE — 83735 ASSAY OF MAGNESIUM: CPT | Mod: NTX

## 2018-11-10 PROCEDURE — 86920 COMPATIBILITY TEST SPIN: CPT | Mod: NTX

## 2018-11-10 PROCEDURE — 25000003 PHARM REV CODE 250: Mod: NTX | Performed by: HOSPITALIST

## 2018-11-10 PROCEDURE — 87040 BLOOD CULTURE FOR BACTERIA: CPT | Mod: NTX

## 2018-11-10 PROCEDURE — 80053 COMPREHEN METABOLIC PANEL: CPT | Mod: 91,NTX

## 2018-11-10 PROCEDURE — 93010 EKG 12-LEAD: ICD-10-PCS | Mod: NTX,,, | Performed by: INTERNAL MEDICINE

## 2018-11-10 PROCEDURE — 85730 THROMBOPLASTIN TIME PARTIAL: CPT | Mod: NTX

## 2018-11-10 PROCEDURE — 84100 ASSAY OF PHOSPHORUS: CPT | Mod: NTX

## 2018-11-10 PROCEDURE — 99233 PR SUBSEQUENT HOSPITAL CARE,LEVL III: ICD-10-PCS | Mod: NTX,,, | Performed by: INTERNAL MEDICINE

## 2018-11-10 PROCEDURE — 25000003 PHARM REV CODE 250: Mod: NTX | Performed by: INTERNAL MEDICINE

## 2018-11-10 PROCEDURE — 93010 ELECTROCARDIOGRAM REPORT: CPT | Mod: NTX,,, | Performed by: INTERNAL MEDICINE

## 2018-11-10 PROCEDURE — 80202 ASSAY OF VANCOMYCIN: CPT | Mod: NTX

## 2018-11-10 PROCEDURE — 82248 BILIRUBIN DIRECT: CPT | Mod: NTX

## 2018-11-10 PROCEDURE — 86901 BLOOD TYPING SEROLOGIC RH(D): CPT | Mod: NTX

## 2018-11-10 PROCEDURE — 85610 PROTHROMBIN TIME: CPT | Mod: 91,NTX

## 2018-11-10 PROCEDURE — 99233 SBSQ HOSP IP/OBS HIGH 50: CPT | Mod: NTX,,, | Performed by: INTERNAL MEDICINE

## 2018-11-10 PROCEDURE — 85384 FIBRINOGEN ACTIVITY: CPT | Mod: NTX

## 2018-11-10 PROCEDURE — 36415 COLL VENOUS BLD VENIPUNCTURE: CPT | Mod: NTX

## 2018-11-10 PROCEDURE — 80053 COMPREHEN METABOLIC PANEL: CPT | Mod: NTX

## 2018-11-10 PROCEDURE — 93005 ELECTROCARDIOGRAM TRACING: CPT | Mod: NTX

## 2018-11-10 PROCEDURE — 20600001 HC STEP DOWN PRIVATE ROOM: Mod: NTX

## 2018-11-10 RX ORDER — HYDROCODONE BITARTRATE AND ACETAMINOPHEN 500; 5 MG/1; MG/1
TABLET ORAL
Status: DISCONTINUED | OUTPATIENT
Start: 2018-11-10 | End: 2018-11-11

## 2018-11-10 RX ORDER — ZINC SULFATE 50(220)MG
220 CAPSULE ORAL 2 TIMES DAILY
Status: DISCONTINUED | OUTPATIENT
Start: 2018-11-10 | End: 2018-11-11

## 2018-11-10 RX ORDER — SUCRALFATE 1 G/10ML
1 SUSPENSION ORAL
Status: DISCONTINUED | OUTPATIENT
Start: 2018-11-10 | End: 2018-11-11

## 2018-11-10 RX ORDER — CIPROFLOXACIN 2 MG/ML
400 INJECTION, SOLUTION INTRAVENOUS
Status: DISCONTINUED | OUTPATIENT
Start: 2018-11-10 | End: 2018-11-11 | Stop reason: HOSPADM

## 2018-11-10 RX ORDER — VANCOMYCIN HCL IN 5 % DEXTROSE 1.5G/250ML
1500 PLASTIC BAG, INJECTION (ML) INTRAVENOUS
Status: DISCONTINUED | OUTPATIENT
Start: 2018-11-10 | End: 2018-11-11 | Stop reason: HOSPADM

## 2018-11-10 RX ORDER — METHYLPREDNISOLONE SODIUM SUCCINATE 500 MG/8ML
500 INJECTION INTRAMUSCULAR; INTRAVENOUS
Status: COMPLETED | OUTPATIENT
Start: 2018-11-10 | End: 2018-11-11

## 2018-11-10 RX ADMIN — TRIAMCINOLONE ACETONIDE: 1 CREAM TOPICAL at 08:11

## 2018-11-10 RX ADMIN — Medication 220 MG: at 08:11

## 2018-11-10 RX ADMIN — SEVELAMER CARBONATE 800 MG: 800 TABLET, FILM COATED ORAL at 11:11

## 2018-11-10 RX ADMIN — HYDROXYZINE HYDROCHLORIDE 25 MG: 25 TABLET ORAL at 09:11

## 2018-11-10 RX ADMIN — RIFAXIMIN 550 MG: 550 TABLET ORAL at 08:11

## 2018-11-10 RX ADMIN — SODIUM BICARBONATE 650 MG TABLET 650 MG: at 08:11

## 2018-11-10 RX ADMIN — SUCRALFATE 1 G: 1 SUSPENSION ORAL at 08:11

## 2018-11-10 RX ADMIN — PANTOPRAZOLE SODIUM 40 MG: 40 TABLET, DELAYED RELEASE ORAL at 08:11

## 2018-11-10 RX ADMIN — HYDROXYZINE HYDROCHLORIDE 25 MG: 25 TABLET ORAL at 12:11

## 2018-11-10 RX ADMIN — SUCRALFATE 1 G: 1 SUSPENSION ORAL at 04:11

## 2018-11-10 RX ADMIN — FLUCONAZOLE 100 MG: 100 TABLET ORAL at 08:11

## 2018-11-10 RX ADMIN — SEVELAMER CARBONATE 800 MG: 800 TABLET, FILM COATED ORAL at 04:11

## 2018-11-10 RX ADMIN — MIDODRINE HYDROCHLORIDE 15 MG: 5 TABLET ORAL at 08:11

## 2018-11-10 RX ADMIN — CIPROFLOXACIN HYDROCHLORIDE 250 MG: 250 TABLET, FILM COATED ORAL at 08:11

## 2018-11-10 RX ADMIN — MIDODRINE HYDROCHLORIDE 15 MG: 5 TABLET ORAL at 02:11

## 2018-11-10 RX ADMIN — THERA TABS 1 TABLET: TAB at 11:11

## 2018-11-10 RX ADMIN — Medication 100 MG: at 08:11

## 2018-11-10 RX ADMIN — LACTULOSE 10 G: 20 SOLUTION ORAL at 08:11

## 2018-11-10 RX ADMIN — SUCRALFATE 1 G: 1 SUSPENSION ORAL at 11:11

## 2018-11-10 RX ADMIN — SEVELAMER CARBONATE 800 MG: 800 TABLET, FILM COATED ORAL at 08:11

## 2018-11-10 RX ADMIN — SODIUM BICARBONATE 650 MG TABLET 650 MG: at 02:11

## 2018-11-10 RX ADMIN — HYDROXYZINE HYDROCHLORIDE 25 MG: 25 TABLET ORAL at 04:11

## 2018-11-10 RX ADMIN — FOLIC ACID 1 MG: 1 TABLET ORAL at 08:11

## 2018-11-10 NOTE — NURSING TRANSFER
Nursing Transfer Note      11/9/2018     Transfer To: [id number] From [facility] 27     Transfer via stretcher    Transfer with cardiac monitoring    Transported by pct    Medicines sent: none    Chart send with patient: Yes    Notified: GISELL preciado    Patient reassessed at: 1800 on 11/9/18     Upon arrival to floor: cardiac monitor applied, patient oriented to room, call bell in reach and bed in lowest position

## 2018-11-10 NOTE — SUBJECTIVE & OBJECTIVE
Scheduled Meds:   sodium chloride 0.9%   Intravenous Once    ciprofloxacin HCl  250 mg Oral Daily    fluconazole  100 mg Oral Daily    folic acid  1 mg Oral Daily    lactulose  10 g Oral BID    lidocaine  1 patch Transdermal Q24H    lidocaine  1 patch Transdermal Q24H    midodrine  15 mg Oral TID    multivitamin  1 tablet Oral Daily    pantoprazole  40 mg Oral Daily    rifAXImin  550 mg Oral BID    sevelamer carbonate  800 mg Oral TID WM    sodium bicarbonate  650 mg Oral TID    sucralfate  1 g Oral QID (AC & HS)    thiamine  100 mg Oral Daily    triamcinolone acetonide 0.1%   Topical (Top) BID    zinc sulfate  220 mg Oral BID     Continuous Infusions:  PRN Meds:sodium chloride, sodium chloride, sodium chloride, sodium chloride 0.9%, acetaminophen, albumin human 25%, albuterol-ipratropium, vancomycin (VANCOCIN) IVPB **AND** ciprofloxacin, dextrose 50%, dextrose 50%, glucagon (human recombinant), glucose, glucose, heparin (porcine), hepatitis B virus vacc.rec(PF), hydrOXYzine HCl, insulin aspart U-100, ondansetron, promethazine (PHENERGAN) IVPB, promethazine (PHENERGAN) IVPB, ramelteon, sodium chloride 0.9%, sodium chloride 0.9%, traMADol    Review of Systems   Constitutional: Positive for appetite change, fatigue and unexpected weight change. Negative for activity change, chills, diaphoresis and fever.   HENT: Negative for congestion, sinus pressure, sneezing and sore throat.    Eyes: Negative for pain, redness and visual disturbance.   Respiratory: Negative for cough, shortness of breath and wheezing.    Cardiovascular: Negative for chest pain, palpitations and leg swelling.   Gastrointestinal: Positive for abdominal distention. Negative for abdominal pain, anal bleeding, blood in stool, diarrhea, nausea and vomiting.   Endocrine: Negative for cold intolerance, heat intolerance and polydipsia.   Genitourinary: Negative for dysuria, scrotal swelling and testicular pain.   Musculoskeletal: Negative  for arthralgias, back pain, myalgias and neck pain.   Skin: Negative for pallor and rash.   Allergic/Immunologic: Negative for food allergies and immunocompromised state.   Neurological: Negative for dizziness, syncope, speech difficulty and weakness.   Hematological: Bruises/bleeds easily.   Psychiatric/Behavioral: Positive for confusion, decreased concentration and sleep disturbance. Negative for agitation.     Objective:     Vital Signs (Most Recent):  Temp: 98.5 °F (36.9 °C) (11/10/18 0750)  Pulse: 90 (11/10/18 1058)  Resp: 18 (11/10/18 0750)  BP: (!) 106/59 (11/10/18 0750)  SpO2: 97 % (11/10/18 0750) Vital Signs (24h Range):  Temp:  [97.9 °F (36.6 °C)-98.7 °F (37.1 °C)] 98.5 °F (36.9 °C)  Pulse:  [74-90] 90  Resp:  [17-18] 18  SpO2:  [94 %-98 %] 97 %  BP: ()/(48-59) 106/59     Weight: 84.6 kg (186 lb 9.9 oz)  Body mass index is 26.78 kg/m².    Intake/Output - Last 3 Shifts       11/08 0700 - 11/09 0659 11/09 0700 - 11/10 0659 11/10 0700 - 11/11 0659    P.O. 500 660     Blood       Other  750     Total Intake(mL/kg) 500 (5.6) 1410 (16.7)     Urine (mL/kg/hr) 0 (0)      Other  1650     Stool 0      Total Output 0 1650     Net +500 -240            Urine Occurrence 1 x 1 x     Stool Occurrence 1 x 3 x           Physical Exam   Constitutional: He is oriented to person, place, and time. He appears well-developed. No distress.   Chronically ill-appearing. Malnourished. Temporal wasting.     HENT:   Head: Normocephalic and atraumatic.   Mouth/Throat: Oropharynx is clear and moist.   Eyes: Conjunctivae are normal. Scleral icterus is present.   Neck: Normal range of motion.   Cardiovascular: Normal rate, regular rhythm, normal heart sounds and intact distal pulses.   Pulmonary/Chest: Effort normal and breath sounds normal. No respiratory distress. He has no wheezes. He has no rales.   Abdominal: Soft. Normal appearance and bowel sounds are normal. He exhibits distension. He exhibits no shifting dullness, no  pulsatile liver, no fluid wave and no ascites. There is no splenomegaly or hepatomegaly. No hernia.   Moderate ascites   Musculoskeletal: He exhibits no edema.   Neurological: He is alert and oriented to person, place, and time. He is not disoriented.   Mild asterixis.   Skin: Skin is warm and dry. No rash noted. He is not diaphoretic.   Psychiatric: He has a normal mood and affect. His behavior is normal. His mood appears not anxious. His affect is not inappropriate. He is not agitated. He is communicative. He is attentive.   Nursing note and vitals reviewed.      Laboratory:  Immunosuppressants     None        CBC:   Recent Labs   Lab 11/10/18  0444   WBC 14.68*   RBC 3.05*   HGB 10.4*   HCT 30.7*   PLT 78*   *   MCH 34.1*   MCHC 33.9     CMP:   Recent Labs   Lab 11/10/18  0444   GLU 86   CALCIUM 8.5*   ALBUMIN 2.3*   PROT 5.1*      K 5.0   CO2 23      BUN 28*   CREATININE 5.1*   ALKPHOS 238*   ALT 28   AST 80*   BILITOT 37.8*     Coagulation:   Recent Labs   Lab 11/09/18  1351 11/10/18  0444   INR 2.2* 2.2*   APTT 46.9*  --      Labs within the past 24 hours have been reviewed.    Diagnostic Results:  I have personally reviewed all pertinent imaging studies.

## 2018-11-10 NOTE — PROGRESS NOTES
[facility] HEMODIALYSIS NOTE     Patient currently on hemodialysis for removal of uremic toxins and volume.    Patient seen and evaluated on hemodialysis, tolerating treatment, see HD flowsheet for vitals and assessments.      Ultrafiltration goal is 1-2L     Labs have been reviewed and the dialysate bath has been adjusted.     Assessment/Plan:  DEL/ATN, HD today for removal of uremic toxins and volume.

## 2018-11-10 NOTE — PROGRESS NOTES
Procedure cancelled.  Pt to return to room on 8th floor.  Transplant team at bedside to discuss cancellation.  Pt and family verbalize understanding.

## 2018-11-10 NOTE — TELEPHONE ENCOUNTER
ORGAN OFFER NOTE    Notified by Alfredo Aquino, , of liver offer with donor information.  Donor and recipient information read back and verified.  Spoke with Femi Enciso and identified no acute medical issues during telephone assessment. Patient veralized understanding and willingness to come in for transplant.  Patient is currently admitted so I called Dr Sharma to make sure there were not issues preventing patient from being transplanted, no concerns found.

## 2018-11-10 NOTE — TELEPHONE ENCOUNTER
MELD-Na score: 42 at 11/10/2018  4:44 AM  MELD score: 42 at 11/10/2018  4:44 AM  Calculated from:  Serum Creatinine: 5.1 mg/dL (Rounded to 4 mg/dL) at 11/10/2018  4:44 AM  Serum Sodium: 138 mmol/L (Rounded to 137 mmol/L) at 11/10/2018  4:44 AM  Total Bilirubin: 37.8 mg/dL at 11/10/2018  4:44 AM  INR(ratio): 2.2 at 11/10/2018  4:44 AM  Age: 53 years    Encephalopathy: yes  Ascites: yes  Dialysis: yes      Functional Status (Karnofsky Score): 20% - Very sick, hospitalization necessary: active treatment necessary  Physical Capacity: No Limitations      Recertification form sent on 11/10/2018.    Recertification requestor: Heidi Sharma MD

## 2018-11-10 NOTE — PLAN OF CARE
Problem: Patient Care Overview  Goal: Plan of Care Review  Outcome: Ongoing (interventions implemented as appropriate)  No significant events overnight, vitals have been stable,pt slept intermittently after receiving late dinner tray, prn med for itching given x1,  at HS/no coverage needed,NSR on the monitor, will continue to monitor.

## 2018-11-10 NOTE — PROGRESS NOTES
"[facility]  Liver Transplant  Progress Note    Patient Name: Femi Enciso  MRN: [id number]  Admission Date: 10/27/2018  Hospital Length of Stay: 14 days  Code Status: Full Code  Primary Care Provider: Primary Doctor No    Subjective:     History of Present Illness:  MELD-Na score: 42 at 11/2/2018  4:52 AM  MELD score: 42 at 11/2/2018  4:52 AM  Calculated from:  Serum Creatinine: 6.8 mg/dL (Rounded to 4 mg/dL) at 11/2/2018  4:52 AM  Serum Sodium: 133 mmol/L at 11/2/2018  4:52 AM  Total Bilirubin: 36.9 mg/dL at 11/2/2018  4:52 AM  INR(ratio): 2.2 at 11/2/2018  4:52 AM  Age: 53 years     Estimated body mass index is 25.78 kg/m² as calculated from the following:    Height as of this encounter: 5' 10" (1.778 m).    Weight as of this encounter: 81.5 kg (179 lb 10.8 oz).     52 y/o man with DM2, ESLD secondary to EtOH abuse in 09/2018, DEL progressing to ESRD admitted to the medicine team for transplant evaluation. Of note, he was previous in fair health until in September, N/V and diarrhea after sanwitch, resolved, then found have increasing jaundice. Went to hospital, found to have ALI, while he also got hypoxic, CT chest showed large pleural effusion, he was admitted, underwent thoracentesis(removed about 1L), cell study negative for malignancy, and he was treated for PNA and C.diff as well. However, he continue to have weight loss, and start to have AMS, readmitted to hospital, found to have DEL despite ALI. He has been on RRT on this admission to [facility]. Last drink was 9/21/2018. Denies any fever or chills or chest pain or abdominal pain.         Hospital Course:  Interval History:  No acute events overnight.  BP low/stable on Midodrine 15 mg tid.    Patient w DEL for past 2 weeks, not a candidate for kidney transplant before 12/4/18.  He is listed for liver transplant w MELD of 42 today- T bili 37.5/Cr 7.2/INR 2.1.  He is having 3-4 BMs on Lactulose 15 mg every 6 hours.  Faint morbilliform rash on torso " and bilateral upper and lower extremities noted- improving per patient.  Pruritis managed w Hydroxyzine.         Scheduled Meds:   sodium chloride 0.9%   Intravenous Once    ciprofloxacin HCl  250 mg Oral Daily    fluconazole  100 mg Oral Daily    folic acid  1 mg Oral Daily    lactulose  10 g Oral BID    lidocaine  1 patch Transdermal Q24H    lidocaine  1 patch Transdermal Q24H    midodrine  15 mg Oral TID    multivitamin  1 tablet Oral Daily    pantoprazole  40 mg Oral Daily    rifAXImin  550 mg Oral BID    sevelamer carbonate  800 mg Oral TID WM    sodium bicarbonate  650 mg Oral TID    sucralfate  1 g Oral QID (AC & HS)    thiamine  100 mg Oral Daily    triamcinolone acetonide 0.1%   Topical (Top) BID    zinc sulfate  220 mg Oral BID     Continuous Infusions:  PRN Meds:sodium chloride, sodium chloride, sodium chloride, sodium chloride 0.9%, acetaminophen, albumin human 25%, albuterol-ipratropium, vancomycin (VANCOCIN) IVPB **AND** ciprofloxacin, dextrose 50%, dextrose 50%, glucagon (human recombinant), glucose, glucose, heparin (porcine), hepatitis B virus vacc.rec(PF), hydrOXYzine HCl, insulin aspart U-100, ondansetron, promethazine (PHENERGAN) IVPB, promethazine (PHENERGAN) IVPB, ramelteon, sodium chloride 0.9%, sodium chloride 0.9%, traMADol    Review of Systems   Constitutional: Positive for appetite change, fatigue and unexpected weight change. Negative for activity change, chills, diaphoresis and fever.   HENT: Negative for congestion, sinus pressure, sneezing and sore throat.    Eyes: Negative for pain, redness and visual disturbance.   Respiratory: Negative for cough, shortness of breath and wheezing.    Cardiovascular: Negative for chest pain, palpitations and leg swelling.   Gastrointestinal: Positive for abdominal distention. Negative for abdominal pain, anal bleeding, blood in stool, diarrhea, nausea and vomiting.   Endocrine: Negative for cold intolerance, heat intolerance and  polydipsia.   Genitourinary: Negative for dysuria, scrotal swelling and testicular pain.   Musculoskeletal: Negative for arthralgias, back pain, myalgias and neck pain.   Skin: Negative for pallor and rash.   Allergic/Immunologic: Negative for food allergies and immunocompromised state.   Neurological: Negative for dizziness, syncope, speech difficulty and weakness.   Hematological: Bruises/bleeds easily.   Psychiatric/Behavioral: Positive for confusion, decreased concentration and sleep disturbance. Negative for agitation.     Objective:     Vital Signs (Most Recent):  Temp: 98.5 °F (36.9 °C) (11/10/18 0750)  Pulse: 90 (11/10/18 1058)  Resp: 18 (11/10/18 0750)  BP: (!) 106/59 (11/10/18 0750)  SpO2: 97 % (11/10/18 0750) Vital Signs (24h Range):  Temp:  [97.9 °F (36.6 °C)-98.7 °F (37.1 °C)] 98.5 °F (36.9 °C)  Pulse:  [74-90] 90  Resp:  [17-18] 18  SpO2:  [94 %-98 %] 97 %  BP: ()/(48-59) 106/59     Weight: 84.6 kg (186 lb 9.9 oz)  Body mass index is 26.78 kg/m².    Intake/Output - Last 3 Shifts       11/08 0700 - 11/09 0659 11/09 0700 - 11/10 0659 11/10 0700 - 11/11 0659    P.O. 500 660     Blood       Other  750     Total Intake(mL/kg) 500 (5.6) 1410 (16.7)     Urine (mL/kg/hr) 0 (0)      Other  1650     Stool 0      Total Output 0 1650     Net +500 -240            Urine Occurrence 1 x 1 x     Stool Occurrence 1 x 3 x           Physical Exam   Constitutional: He is oriented to person, place, and time. He appears well-developed. No distress.   Chronically ill-appearing. Malnourished. Temporal wasting.     HENT:   Head: Normocephalic and atraumatic.   Mouth/Throat: Oropharynx is clear and moist.   Eyes: Conjunctivae are normal. Scleral icterus is present.   Neck: Normal range of motion.   Cardiovascular: Normal rate, regular rhythm, normal heart sounds and intact distal pulses.   Pulmonary/Chest: Effort normal and breath sounds normal. No respiratory distress. He has no wheezes. He has no rales.   Abdominal:  Soft. Normal appearance and bowel sounds are normal. He exhibits distension. He exhibits no shifting dullness, no pulsatile liver, no fluid wave and no ascites. There is no splenomegaly or hepatomegaly. No hernia.   Moderate ascites   Musculoskeletal: He exhibits no edema.   Neurological: He is alert and oriented to person, place, and time. He is not disoriented.   Mild asterixis.   Skin: Skin is warm and dry. No rash noted. He is not diaphoretic.   Psychiatric: He has a normal mood and affect. His behavior is normal. His mood appears not anxious. His affect is not inappropriate. He is not agitated. He is communicative. He is attentive.   Nursing note and vitals reviewed.      Laboratory:  Immunosuppressants     None        CBC:   Recent Labs   Lab 11/10/18  0444   WBC 14.68*   RBC 3.05*   HGB 10.4*   HCT 30.7*   PLT 78*   *   MCH 34.1*   MCHC 33.9     CMP:   Recent Labs   Lab 11/10/18  0444   GLU 86   CALCIUM 8.5*   ALBUMIN 2.3*   PROT 5.1*      K 5.0   CO2 23      BUN 28*   CREATININE 5.1*   ALKPHOS 238*   ALT 28   AST 80*   BILITOT 37.8*     Coagulation:   Recent Labs   Lab 11/09/18  1351 11/10/18  0444   INR 2.2* 2.2*   APTT 46.9*  --      Labs within the past 24 hours have been reviewed.    Diagnostic Results:  I have personally reviewed all pertinent imaging studies.    Assessment/Plan:     * Pre-transplant evaluation for liver transplant      54 y/o man with DM2, ESLD secondary to EtOH abuse in 09/2018, DEL progressing to ESRD requiring RRT. He has had worsening renal function since September 2018. His last drink was in 09/2018. He has had no surgical procedures on abdomen and has been mobilizing well till admission. He may qualify as SLK candidate in view of renal function deterioration over 6 weeks. He needs Transplant nephrology opinion about candidacy for SLK transplant. His cardiac evaluation needs to be updated and cross sectional imaging is needed prior to activation. He is   Surgically acceptable & SC risk A candidate on surgical evaluation. Addiction Psych consult shall be made during this admission.     Acute maculopapular rash    - morbilliform rash likely from drug reaction possibly Cefepime (10/27-10/30).  Per patient rash is very pruritic.  He has been using steroid cream w minimal relief.  He stated significant improvement w receiving Hydroxyzine.  Monitor.         Moderate protein malnutrition    - temporal muscle wasting noted.  Decreased appetite and energy over past week worse.   Dietician consulted on admit.  Will consult post transplant as needed.         Metabolic acidosis    - well managed on sodium bicarb.  Trend daily.        Hepatic encephalopathy    - well managed on Rifaximin and lactulose 10 g bid.  Monitor.       Coagulopathy    - to improve w transplant.  No evidence of acute bleeding, no hematemesis or BRBPR.         DEL (acute kidney injury)    - DEL over past 2 weeks.  Nephrology was consulted.  Pt not a candidate for kidney transplant before 12-4-18.    - pt tolerating HD w no fluid removed today given hypotension.    - pt w offer for liver transplant this evening.  He will receive intra-op HD.    - cont strict I/O's.         Hepatorenal syndrome    - del past 2 weeks.  Pt on Midodrine.  Last HD 11/9/18- 0 fluid removed 2/2 hypotension.  - pt will receive intra op HD.         Jaundice    - t bili 37.5.  Monitor.       Alcoholic hepatitis with ascites    - minimal ascites noted on exam.  Small volume ascites noted on abdominal US 10/27/18.  Monitor.        Acute liver failure without hepatic coma    - patient listed for liver transplant 11/8.  MELD on 11/9 42.  Pt w offer for liver transplant this evening.  1800 and 1930.            VTE Risk Mitigation (From admission, onward)        Ordered     IP VTE HIGH RISK PATIENT  Once      11/09/18 1236     Send SCD stockings and NI hose with patient to OR  Continuous      11/09/18 1236     heparin (porcine) injection  1,000 Units  As needed (PRN)      11/07/18 1729     Place NI hose  Until discontinued      10/27/18 1636          The patients clinical status was discussed at multidisplinary rounds, involving transplant surgery, transplant medicine, pharmacy, nursing, nutrition, and social work    Discharge Planning:  No Patient Care Coordination Note on file.      Heidi Sharma MD  Liver Transplant  [facility]

## 2018-11-10 NOTE — PROGRESS NOTES
Patient arrived to the unit from [facility] in stable condition. Patient is AAOx3, requires stand by assistance.  Non-slip socks are in use. Patient denies any pain or nausea. Telemetry and dietary notified of the patient's transfer. Patient c/o itching to BUE and BLE rash. Atarax given per orders. Monitoring the patient's blood sugar AC&HS. Reminded the patient and his wife to call for assistance. Call light and personal items are within reach.

## 2018-11-11 PROBLEM — T38.0X5A ADRENAL CORTICAL STEROIDS CAUSING ADVERSE EFFECT IN THERAPEUTIC USE: Status: ACTIVE | Noted: 2018-11-11

## 2018-11-11 PROBLEM — Z29.89 PROPHYLACTIC IMMUNOTHERAPY: Status: ACTIVE | Noted: 2018-11-11

## 2018-11-11 PROBLEM — R73.9 ACUTE HYPERGLYCEMIA: Status: ACTIVE | Noted: 2018-11-11

## 2018-11-11 PROBLEM — Z94.4 S/P LIVER TRANSPLANT: Status: ACTIVE | Noted: 2018-11-11

## 2018-11-11 LAB
ABO + RH BLD: NORMAL
ALBUMIN SERPL BCP-MCNC: 1.7 G/DL
ALBUMIN SERPL BCP-MCNC: 1.9 G/DL
ALBUMIN SERPL BCP-MCNC: 2 G/DL
ALBUMIN SERPL BCP-MCNC: 2 G/DL
ALBUMIN SERPL BCP-MCNC: 2.1 G/DL
ALBUMIN SERPL BCP-MCNC: 2.1 G/DL
ALBUMIN SERPL BCP-MCNC: 2.2 G/DL
ALLENS TEST: ABNORMAL
ALP SERPL-CCNC: 228 U/L
ALP SERPL-CCNC: 62 U/L
ALT SERPL W/O P-5'-P-CCNC: 28 U/L
ALT SERPL W/O P-5'-P-CCNC: 466 U/L
ALT SERPL W/O P-5'-P-CCNC: 586 U/L
AMYLASE SERPL-CCNC: 30 U/L
ANION GAP SERPL CALC-SCNC: 11 MMOL/L
ANION GAP SERPL CALC-SCNC: 11 MMOL/L
ANION GAP SERPL CALC-SCNC: 9 MMOL/L
APPEARANCE FLD: CLEAR
APTT BLDCRRT: 36.8 SEC
APTT BLDCRRT: 44.2 SEC
APTT BLDCRRT: 44.9 SEC
APTT BLDCRRT: 47.4 SEC
APTT BLDCRRT: 50.9 SEC
APTT BLDCRRT: 57.1 SEC
APTT BLDCRRT: >150 SEC
AST SERPL-CCNC: 466 U/L
AST SERPL-CCNC: 554 U/L
AST SERPL-CCNC: 597 U/L
AST SERPL-CCNC: 70 U/L
AST SERPL-CCNC: 794 U/L
BASOPHILS # BLD AUTO: 0.02 K/UL
BASOPHILS # BLD AUTO: 0.03 K/UL
BASOPHILS # BLD AUTO: 0.04 K/UL
BASOPHILS NFR BLD: 0.2 %
BASOPHILS NFR BLD: 0.3 %
BASOPHILS NFR BLD: 0.4 %
BILIRUB DIRECT SERPL-MCNC: >14 MG/DL
BILIRUB SERPL-MCNC: 11.3 MG/DL
BILIRUB SERPL-MCNC: 37.2 MG/DL
BLD GP AB SCN CELLS X3 SERPL QL: NORMAL
BLD PROD TYP BPU: NORMAL
BLOOD UNIT EXPIRATION DATE: NORMAL
BLOOD UNIT TYPE CODE: 5100
BLOOD UNIT TYPE CODE: 6200
BLOOD UNIT TYPE CODE: 7300
BLOOD UNIT TYPE CODE: 9500
BLOOD UNIT TYPE CODE: 9500
BLOOD UNIT TYPE: NORMAL
BODY FLD TYPE: NORMAL
BUN SERPL-MCNC: 16 MG/DL
BUN SERPL-MCNC: 24 MG/DL
BUN SERPL-MCNC: 39 MG/DL
CA-I BLDV-SCNC: 0.92 MMOL/L
CA-I BLDV-SCNC: 1.04 MMOL/L
CA-I BLDV-SCNC: 1.12 MMOL/L
CA-I BLDV-SCNC: 1.18 MMOL/L
CA-I BLDV-SCNC: 1.23 MMOL/L
CALCIUM SERPL-MCNC: 8.7 MG/DL
CALCIUM SERPL-MCNC: 8.9 MG/DL
CALCIUM SERPL-MCNC: 9.5 MG/DL
CHLORIDE SERPL-SCNC: 101 MMOL/L
CHLORIDE SERPL-SCNC: 106 MMOL/L
CHLORIDE SERPL-SCNC: 107 MMOL/L
CO2 SERPL-SCNC: 22 MMOL/L
CO2 SERPL-SCNC: 23 MMOL/L
CO2 SERPL-SCNC: 24 MMOL/L
CODING SYSTEM: NORMAL
COLOR FLD: YELLOW
CREAT SERPL-MCNC: 2.2 MG/DL
CREAT SERPL-MCNC: 3 MG/DL
CREAT SERPL-MCNC: 6.4 MG/DL
DELSYS: ABNORMAL
DIFFERENTIAL METHOD: ABNORMAL
DISPENSE STATUS: NORMAL
EOSINOPHIL # BLD AUTO: 0 K/UL
EOSINOPHIL # BLD AUTO: 0.1 K/UL
EOSINOPHIL # BLD AUTO: 0.1 K/UL
EOSINOPHIL NFR BLD: 0.4 %
EOSINOPHIL NFR BLD: 0.4 %
EOSINOPHIL NFR BLD: 1 %
ERYTHROCYTE [DISTWIDTH] IN BLOOD BY AUTOMATED COUNT: 15.3 %
ERYTHROCYTE [DISTWIDTH] IN BLOOD BY AUTOMATED COUNT: 15.8 %
ERYTHROCYTE [DISTWIDTH] IN BLOOD BY AUTOMATED COUNT: 16.2 %
ERYTHROCYTE [SEDIMENTATION RATE] IN BLOOD BY WESTERGREN METHOD: 16 MM/H
EST. GFR  (AFRICAN AMERICAN): 10.5 ML/MIN/1.73 M^2
EST. GFR  (AFRICAN AMERICAN): 26.2 ML/MIN/1.73 M^2
EST. GFR  (AFRICAN AMERICAN): 38.1 ML/MIN/1.73 M^2
EST. GFR  (NON AFRICAN AMERICAN): 22.7 ML/MIN/1.73 M^2
EST. GFR  (NON AFRICAN AMERICAN): 33 ML/MIN/1.73 M^2
EST. GFR  (NON AFRICAN AMERICAN): 9.1 ML/MIN/1.73 M^2
FIBRINOGEN PPP-MCNC: 161 MG/DL
FIBRINOGEN PPP-MCNC: 165 MG/DL
FIBRINOGEN PPP-MCNC: 178 MG/DL
FIBRINOGEN PPP-MCNC: 208 MG/DL
FIBRINOGEN PPP-MCNC: 231 MG/DL
FIBRINOGEN PPP-MCNC: <70 MG/DL
FIO2: 50
FLOW: 60
GLUCOSE SERPL-MCNC: 106 MG/DL
GLUCOSE SERPL-MCNC: 173 MG/DL
GLUCOSE SERPL-MCNC: 175 MG/DL
GLUCOSE SERPL-MCNC: 178 MG/DL
GLUCOSE SERPL-MCNC: 182 MG/DL
GLUCOSE SERPL-MCNC: 195 MG/DL
GLUCOSE SERPL-MCNC: 257 MG/DL
GLUCOSE SERPL-MCNC: 98 MG/DL
GRAM STN SPEC: NORMAL
HCO3 UR-SCNC: 24 MMOL/L (ref 24–28)
HCO3 UR-SCNC: 24.9 MMOL/L (ref 24–28)
HCO3 UR-SCNC: 25.3 MMOL/L (ref 24–28)
HCT VFR BLD AUTO: 20.8 %
HCT VFR BLD AUTO: 23.2 %
HCT VFR BLD AUTO: 23.7 %
HCT VFR BLD AUTO: 24.3 %
HCT VFR BLD AUTO: 24.7 %
HCT VFR BLD AUTO: 25.6 %
HCT VFR BLD AUTO: 26.1 %
HCT VFR BLD AUTO: 27.2 %
HCT VFR BLD CALC: 21 %PCV (ref 36–54)
HGB BLD-MCNC: 7.1 G/DL
HGB BLD-MCNC: 7.9 G/DL
HGB BLD-MCNC: 7.9 G/DL
HGB BLD-MCNC: 8.3 G/DL
HGB BLD-MCNC: 8.3 G/DL
HGB BLD-MCNC: 8.5 G/DL
HGB BLD-MCNC: 8.6 G/DL
HGB BLD-MCNC: 9.6 G/DL
IMM GRANULOCYTES # BLD AUTO: 0.11 K/UL
IMM GRANULOCYTES # BLD AUTO: 0.11 K/UL
IMM GRANULOCYTES # BLD AUTO: 0.12 K/UL
IMM GRANULOCYTES NFR BLD AUTO: 1 %
IMM GRANULOCYTES NFR BLD AUTO: 1.1 %
IMM GRANULOCYTES NFR BLD AUTO: 1.1 %
INR PPP: 1.5
INR PPP: 1.5
INR PPP: 1.6
INR PPP: 1.7
INR PPP: 2.3
INR PPP: 2.4
INR PPP: 2.8
LDH SERPL L TO P-CCNC: 2103 U/L
LYMPHOCYTES # BLD AUTO: 0.5 K/UL
LYMPHOCYTES # BLD AUTO: 0.7 K/UL
LYMPHOCYTES # BLD AUTO: 0.9 K/UL
LYMPHOCYTES NFR BLD: 4.8 %
LYMPHOCYTES NFR BLD: 6.2 %
LYMPHOCYTES NFR BLD: 8.2 %
LYMPHOCYTES NFR FLD MANUAL: 24 %
MAGNESIUM SERPL-MCNC: 1.5 MG/DL
MAGNESIUM SERPL-MCNC: 1.6 MG/DL
MAGNESIUM SERPL-MCNC: 1.7 MG/DL
MAGNESIUM SERPL-MCNC: 1.8 MG/DL
MCH RBC QN AUTO: 31.7 PG
MCH RBC QN AUTO: 32 PG
MCH RBC QN AUTO: 32.3 PG
MCHC RBC AUTO-ENTMCNC: 33.2 G/DL
MCHC RBC AUTO-ENTMCNC: 33.3 G/DL
MCHC RBC AUTO-ENTMCNC: 34.2 G/DL
MCV RBC AUTO: 95 FL
MCV RBC AUTO: 96 FL
MCV RBC AUTO: 96 FL
MESOTHL CELL NFR FLD MANUAL: 14 %
MODE: ABNORMAL
MONOCYTES # BLD AUTO: 0.3 K/UL
MONOCYTES # BLD AUTO: 0.7 K/UL
MONOCYTES # BLD AUTO: 0.8 K/UL
MONOCYTES NFR BLD: 2.4 %
MONOCYTES NFR BLD: 6.7 %
MONOCYTES NFR BLD: 7.6 %
MONOS+MACROS NFR FLD MANUAL: 38 %
NEUTROPHILS # BLD AUTO: 10.2 K/UL
NEUTROPHILS # BLD AUTO: 8.5 K/UL
NEUTROPHILS # BLD AUTO: 9 K/UL
NEUTROPHILS NFR BLD: 83.2 %
NEUTROPHILS NFR BLD: 85.2 %
NEUTROPHILS NFR BLD: 89.8 %
NEUTROPHILS NFR FLD MANUAL: 24 %
NRBC BLD-RTO: 0 /100 WBC
NUM UNITS TRANS FFP: NORMAL
PCO2 BLDA: 34.2 MMHG (ref 35–45)
PCO2 BLDA: 35.4 MMHG (ref 35–45)
PCO2 BLDA: 36.5 MMHG (ref 35–45)
PEEP: 5
PH SMN: 7.44 [PH] (ref 7.35–7.45)
PH SMN: 7.44 [PH] (ref 7.35–7.45)
PH SMN: 7.48 [PH] (ref 7.35–7.45)
PHOSPHATE SERPL-MCNC: 4.9 MG/DL
PIP: 21
PIP: 22
PIP: 23
PLATELET # BLD AUTO: 33 K/UL
PLATELET # BLD AUTO: 66 K/UL
PLATELET # BLD AUTO: 71 K/UL
PLATELET # BLD AUTO: 77 K/UL
PLATELET # BLD AUTO: 84 K/UL
PLATELET # BLD AUTO: 84 K/UL
PLATELET # BLD AUTO: 90 K/UL
PLATELET # BLD AUTO: 94 K/UL
PLATELET BLD QL SMEAR: ABNORMAL
PMV BLD AUTO: 10.8 FL
PMV BLD AUTO: 11 FL
PMV BLD AUTO: 11.2 FL
PMV BLD AUTO: 11.4 FL
PMV BLD AUTO: 11.5 FL
PMV BLD AUTO: 11.5 FL
PMV BLD AUTO: 11.7 FL
PMV BLD AUTO: 11.9 FL
PO2 BLDA: 115 MMHG (ref 80–100)
PO2 BLDA: 140 MMHG (ref 80–100)
PO2 BLDA: 71 MMHG (ref 80–100)
POC BE: 0 MMOL/L
POC BE: 1 MMOL/L
POC BE: 2 MMOL/L
POC IONIZED CALCIUM: 1.28 MMOL/L (ref 1.06–1.42)
POC SATURATED O2: 95 % (ref 95–100)
POC SATURATED O2: 99 % (ref 95–100)
POC SATURATED O2: 99 % (ref 95–100)
POC TCO2: 25 MMOL/L (ref 23–27)
POC TCO2: 26 MMOL/L (ref 23–27)
POC TCO2: 26 MMOL/L (ref 23–27)
POCT GLUCOSE: 207 MG/DL (ref 70–110)
POCT GLUCOSE: 210 MG/DL (ref 70–110)
POCT GLUCOSE: 214 MG/DL (ref 70–110)
POCT GLUCOSE: 220 MG/DL (ref 70–110)
POCT GLUCOSE: 225 MG/DL (ref 70–110)
POCT GLUCOSE: 236 MG/DL (ref 70–110)
POCT GLUCOSE: 238 MG/DL (ref 70–110)
POCT GLUCOSE: 239 MG/DL (ref 70–110)
POCT GLUCOSE: 241 MG/DL (ref 70–110)
POCT GLUCOSE: 242 MG/DL (ref 70–110)
POCT GLUCOSE: 254 MG/DL (ref 70–110)
POCT GLUCOSE: 256 MG/DL (ref 70–110)
POCT GLUCOSE: 258 MG/DL (ref 70–110)
POOLED CRYOPPT GVN BPU: NORMAL
POOLED CRYOPPT GVN BPU: NORMAL
POTASSIUM BLD-SCNC: 3.9 MMOL/L (ref 3.5–5.1)
POTASSIUM SERPL-SCNC: 3 MMOL/L
POTASSIUM SERPL-SCNC: 3.4 MMOL/L
POTASSIUM SERPL-SCNC: 3.7 MMOL/L
POTASSIUM SERPL-SCNC: 3.8 MMOL/L
POTASSIUM SERPL-SCNC: 3.8 MMOL/L
POTASSIUM SERPL-SCNC: 4.1 MMOL/L
POTASSIUM SERPL-SCNC: 4.5 MMOL/L
POTASSIUM SERPL-SCNC: 5.1 MMOL/L
PROT SERPL-MCNC: 3.1 G/DL
PROT SERPL-MCNC: 5.2 G/DL
PROTHROMBIN TIME: 14.5 SEC
PROTHROMBIN TIME: 15.3 SEC
PROTHROMBIN TIME: 15.6 SEC
PROTHROMBIN TIME: 16 SEC
PROTHROMBIN TIME: 16.2 SEC
PROTHROMBIN TIME: 16.7 SEC
PROTHROMBIN TIME: 22 SEC
PROTHROMBIN TIME: 23 SEC
PROTHROMBIN TIME: 27 SEC
PS: 10
RBC # BLD AUTO: 2.47 M/UL
RBC # BLD AUTO: 2.57 M/UL
RBC # BLD AUTO: 2.68 M/UL
SAMPLE: ABNORMAL
SITE: ABNORMAL
SODIUM BLD-SCNC: 139 MMOL/L (ref 136–145)
SODIUM SERPL-SCNC: 133 MMOL/L
SODIUM SERPL-SCNC: 136 MMOL/L
SODIUM SERPL-SCNC: 136 MMOL/L
SODIUM SERPL-SCNC: 138 MMOL/L
SODIUM SERPL-SCNC: 138 MMOL/L
SODIUM SERPL-SCNC: 139 MMOL/L
SP02: 100
SP02: 100
SP02: 97
TRANS PLATPHERESIS VOL PATIENT: NORMAL ML
VT: 500
WBC # BLD AUTO: 10.86 K/UL
WBC # BLD AUTO: 11.37 K/UL
WBC # BLD AUTO: 9.98 K/UL
WBC # FLD: 65 /CU MM

## 2018-11-11 PROCEDURE — P9047 ALBUMIN (HUMAN), 25%, 50ML: HCPCS | Mod: JG | Performed by: NURSE ANESTHETIST, CERTIFIED REGISTERED

## 2018-11-11 PROCEDURE — 85384 FIBRINOGEN ACTIVITY: CPT | Mod: 91

## 2018-11-11 PROCEDURE — 47135 PR TRANSPLANT LIVER,ALLOTRANSPLANT: ICD-10-PCS | Mod: ,,, | Performed by: TRANSPLANT SURGERY

## 2018-11-11 PROCEDURE — 25000003 PHARM REV CODE 250: Performed by: STUDENT IN AN ORGANIZED HEALTH CARE EDUCATION/TRAINING PROGRAM

## 2018-11-11 PROCEDURE — 99222 PR INITIAL HOSPITAL CARE,LEVL II: ICD-10-PCS | Mod: ,,, | Performed by: NURSE PRACTITIONER

## 2018-11-11 PROCEDURE — P9012 CRYOPRECIPITATE EACH UNIT: HCPCS

## 2018-11-11 PROCEDURE — 82150 ASSAY OF AMYLASE: CPT

## 2018-11-11 PROCEDURE — 84132 ASSAY OF SERUM POTASSIUM: CPT

## 2018-11-11 PROCEDURE — 82306 VITAMIN D 25 HYDROXY: CPT

## 2018-11-11 PROCEDURE — 99223 PR INITIAL HOSPITAL CARE,LEVL III: ICD-10-PCS | Mod: ,,, | Performed by: SURGERY

## 2018-11-11 PROCEDURE — 82040 ASSAY OF SERUM ALBUMIN: CPT | Mod: NTX

## 2018-11-11 PROCEDURE — C1729 CATH, DRAINAGE: HCPCS | Performed by: TRANSPLANT SURGERY

## 2018-11-11 PROCEDURE — 83615 LACTATE (LD) (LDH) ENZYME: CPT

## 2018-11-11 PROCEDURE — 84460 ALANINE AMINO (ALT) (SGPT): CPT

## 2018-11-11 PROCEDURE — 37799 UNLISTED PX VASCULAR SURGERY: CPT

## 2018-11-11 PROCEDURE — 25000003 PHARM REV CODE 250: Performed by: NURSE PRACTITIONER

## 2018-11-11 PROCEDURE — S0028 INJECTION, FAMOTIDINE, 20 MG: HCPCS | Performed by: TRANSPLANT SURGERY

## 2018-11-11 PROCEDURE — 87205 SMEAR GRAM STAIN: CPT | Mod: NTX

## 2018-11-11 PROCEDURE — P9021 RED BLOOD CELLS UNIT: HCPCS

## 2018-11-11 PROCEDURE — 63600175 PHARM REV CODE 636 W HCPCS: Performed by: STUDENT IN AN ORGANIZED HEALTH CARE EDUCATION/TRAINING PROGRAM

## 2018-11-11 PROCEDURE — C1751 CATH, INF, PER/CENT/MIDLINE: HCPCS | Mod: NTX | Performed by: NURSE ANESTHETIST, CERTIFIED REGISTERED

## 2018-11-11 PROCEDURE — P9045 ALBUMIN (HUMAN), 5%, 250 ML: HCPCS | Mod: JG | Performed by: NURSE ANESTHETIST, CERTIFIED REGISTERED

## 2018-11-11 PROCEDURE — 85730 THROMBOPLASTIN TIME PARTIAL: CPT | Mod: 91

## 2018-11-11 PROCEDURE — 85018 HEMOGLOBIN: CPT | Mod: 91

## 2018-11-11 PROCEDURE — 85002 BLEEDING TIME TEST: CPT

## 2018-11-11 PROCEDURE — P9035 PLATELET PHERES LEUKOREDUCED: HCPCS

## 2018-11-11 PROCEDURE — 85025 COMPLETE CBC W/AUTO DIFF WBC: CPT | Mod: 91

## 2018-11-11 PROCEDURE — 82803 BLOOD GASES ANY COMBINATION: CPT

## 2018-11-11 PROCEDURE — 27100021 HC MULTIPORT INFUSION MANIFOLD: Mod: NTX | Performed by: NURSE ANESTHETIST, CERTIFIED REGISTERED

## 2018-11-11 PROCEDURE — 25000003 PHARM REV CODE 250: Performed by: TRANSPLANT SURGERY

## 2018-11-11 PROCEDURE — 27100025 HC TUBING, SET FLUID WARMER: Mod: NTX | Performed by: NURSE ANESTHETIST, CERTIFIED REGISTERED

## 2018-11-11 PROCEDURE — 87086 URINE CULTURE/COLONY COUNT: CPT | Mod: NTX

## 2018-11-11 PROCEDURE — 88307 TISSUE SPECIMEN TO PATHOLOGY - SURGERY: ICD-10-PCS | Mod: 26,,, | Performed by: PATHOLOGY

## 2018-11-11 PROCEDURE — 99900026 HC AIRWAY MAINTENANCE (STAT)

## 2018-11-11 PROCEDURE — 47143 PR TRANSPLANT,PREP DONOR LIVER, WHOLE: ICD-10-PCS | Mod: 51,,, | Performed by: TRANSPLANT SURGERY

## 2018-11-11 PROCEDURE — 63600175 PHARM REV CODE 636 W HCPCS: Performed by: TRANSPLANT SURGERY

## 2018-11-11 PROCEDURE — 37000008 HC ANESTHESIA 1ST 15 MINUTES: Performed by: TRANSPLANT SURGERY

## 2018-11-11 PROCEDURE — 87070 CULTURE OTHR SPECIMN AEROBIC: CPT | Mod: NTX

## 2018-11-11 PROCEDURE — 27201041 HC RESERVOIR, CARDIOTOMY

## 2018-11-11 PROCEDURE — 63600175 PHARM REV CODE 636 W HCPCS: Performed by: NURSE PRACTITIONER

## 2018-11-11 PROCEDURE — 94761 N-INVAS EAR/PLS OXIMETRY MLT: CPT

## 2018-11-11 PROCEDURE — 84450 TRANSFERASE (AST) (SGOT): CPT

## 2018-11-11 PROCEDURE — 82330 ASSAY OF CALCIUM: CPT | Mod: NTX

## 2018-11-11 PROCEDURE — 63600175 PHARM REV CODE 636 W HCPCS: Mod: NTX | Performed by: TRANSPLANT SURGERY

## 2018-11-11 PROCEDURE — 36556 INSERT NON-TUNNEL CV CATH: CPT | Mod: 59,,, | Performed by: ANESTHESIOLOGY

## 2018-11-11 PROCEDURE — D9220A PRA ANESTHESIA: Mod: CRNA,,, | Performed by: NURSE ANESTHETIST, CERTIFIED REGISTERED

## 2018-11-11 PROCEDURE — 85610 PROTHROMBIN TIME: CPT | Mod: 91

## 2018-11-11 PROCEDURE — 88313 TISSUE SPECIMEN TO PATHOLOGY - SURGERY: ICD-10-PCS | Mod: 26,,, | Performed by: PATHOLOGY

## 2018-11-11 PROCEDURE — 27800506 HC CATH, RAPID INFUSION (7.5&8.5): Mod: NTX | Performed by: NURSE ANESTHETIST, CERTIFIED REGISTERED

## 2018-11-11 PROCEDURE — 99223 1ST HOSP IP/OBS HIGH 75: CPT | Mod: ,,, | Performed by: SURGERY

## 2018-11-11 PROCEDURE — P9045 ALBUMIN (HUMAN), 5%, 250 ML: HCPCS | Mod: JG | Performed by: TRANSPLANT SURGERY

## 2018-11-11 PROCEDURE — 88309 TISSUE EXAM BY PATHOLOGIST: CPT | Mod: 26,,, | Performed by: PATHOLOGY

## 2018-11-11 PROCEDURE — 88309 TISSUE SPECIMEN TO PATHOLOGY - SURGERY: ICD-10-PCS | Mod: 26,,, | Performed by: PATHOLOGY

## 2018-11-11 PROCEDURE — 82330 ASSAY OF CALCIUM: CPT

## 2018-11-11 PROCEDURE — 85520 HEPARIN ASSAY: CPT

## 2018-11-11 PROCEDURE — 63600175 PHARM REV CODE 636 W HCPCS: Mod: JG | Performed by: NURSE ANESTHETIST, CERTIFIED REGISTERED

## 2018-11-11 PROCEDURE — 93503 INSERT/PLACE HEART CATHETER: CPT | Mod: 59,,, | Performed by: ANESTHESIOLOGY

## 2018-11-11 PROCEDURE — 83735 ASSAY OF MAGNESIUM: CPT | Mod: NTX

## 2018-11-11 PROCEDURE — 80100014 HC HEMODIALYSIS 1:1: Mod: NTX

## 2018-11-11 PROCEDURE — 87186 SC STD MICRODIL/AGAR DIL: CPT

## 2018-11-11 PROCEDURE — 27201423 OPTIME MED/SURG SUP & DEVICES STERILE SUPPLY: Performed by: TRANSPLANT SURGERY

## 2018-11-11 PROCEDURE — 88307 TISSUE EXAM BY PATHOLOGIST: CPT | Performed by: PATHOLOGY

## 2018-11-11 PROCEDURE — 87088 URINE BACTERIA CULTURE: CPT

## 2018-11-11 PROCEDURE — 85014 HEMATOCRIT: CPT | Mod: 91

## 2018-11-11 PROCEDURE — P9017 PLASMA 1 DONOR FRZ W/IN 8 HR: HCPCS

## 2018-11-11 PROCEDURE — 27200656 HC CATH, FEMORAL ARTERY: Mod: NTX | Performed by: NURSE ANESTHETIST, CERTIFIED REGISTERED

## 2018-11-11 PROCEDURE — 94002 VENT MGMT INPAT INIT DAY: CPT

## 2018-11-11 PROCEDURE — S5010 5% DEXTROSE AND 0.45% SALINE: HCPCS | Performed by: TRANSPLANT SURGERY

## 2018-11-11 PROCEDURE — 87075 CULTR BACTERIA EXCEPT BLOOD: CPT | Mod: NTX

## 2018-11-11 PROCEDURE — 99900035 HC TECH TIME PER 15 MIN (STAT)

## 2018-11-11 PROCEDURE — 84295 ASSAY OF SERUM SODIUM: CPT

## 2018-11-11 PROCEDURE — 27000221 HC OXYGEN, UP TO 24 HOURS

## 2018-11-11 PROCEDURE — 84100 ASSAY OF PHOSPHORUS: CPT

## 2018-11-11 PROCEDURE — 36620 INSERTION CATHETER ARTERY: CPT | Mod: 59,,, | Performed by: ANESTHESIOLOGY

## 2018-11-11 PROCEDURE — 80048 BASIC METABOLIC PNL TOTAL CA: CPT

## 2018-11-11 PROCEDURE — 63600175 PHARM REV CODE 636 W HCPCS: Mod: NTX | Performed by: STUDENT IN AN ORGANIZED HEALTH CARE EDUCATION/TRAINING PROGRAM

## 2018-11-11 PROCEDURE — 47135 TRANSPLANTATION OF LIVER: CPT | Mod: 82,,, | Performed by: TRANSPLANT SURGERY

## 2018-11-11 PROCEDURE — 99222 1ST HOSP IP/OBS MODERATE 55: CPT | Mod: ,,, | Performed by: NURSE PRACTITIONER

## 2018-11-11 PROCEDURE — 47135 PR TRANSPLANT LIVER,ALLOTRANSPLANT: ICD-10-PCS | Mod: 82,,, | Performed by: TRANSPLANT SURGERY

## 2018-11-11 PROCEDURE — 89051 BODY FLUID CELL COUNT: CPT | Mod: NTX

## 2018-11-11 PROCEDURE — 63600175 PHARM REV CODE 636 W HCPCS: Mod: JG | Performed by: TRANSPLANT SURGERY

## 2018-11-11 PROCEDURE — 82947 ASSAY GLUCOSE BLOOD QUANT: CPT | Mod: 91

## 2018-11-11 PROCEDURE — D9220A PRA ANESTHESIA: Mod: ANES,,, | Performed by: ANESTHESIOLOGY

## 2018-11-11 PROCEDURE — 47135 TRANSPLANTATION OF LIVER: CPT | Mod: ,,, | Performed by: TRANSPLANT SURGERY

## 2018-11-11 PROCEDURE — 88313 SPECIAL STAINS GROUP 2: CPT | Mod: 26,,, | Performed by: PATHOLOGY

## 2018-11-11 PROCEDURE — 25000003 PHARM REV CODE 250: Performed by: NURSE ANESTHETIST, CERTIFIED REGISTERED

## 2018-11-11 PROCEDURE — 94003 VENT MGMT INPAT SUBQ DAY: CPT

## 2018-11-11 PROCEDURE — 84295 ASSAY OF SERUM SODIUM: CPT | Mod: NTX

## 2018-11-11 PROCEDURE — 27100088 HC CELL SAVER

## 2018-11-11 PROCEDURE — 20000000 HC ICU ROOM

## 2018-11-11 PROCEDURE — 36000931 HC OR TIME LEV VII EA ADD 15 MIN: Performed by: TRANSPLANT SURGERY

## 2018-11-11 PROCEDURE — 81300000 HC LIVER ACQUISITION CHARGE: Mod: NTX

## 2018-11-11 PROCEDURE — 80053 COMPREHEN METABOLIC PANEL: CPT

## 2018-11-11 PROCEDURE — 27000174 HC ADDITIONAL TIME, PER HR: Mod: NTX

## 2018-11-11 PROCEDURE — 84450 TRANSFERASE (AST) (SGOT): CPT | Mod: 91

## 2018-11-11 PROCEDURE — 87102 FUNGUS ISOLATION CULTURE: CPT | Mod: NTX

## 2018-11-11 PROCEDURE — 86965 POOLING BLOOD PLATELETS: CPT

## 2018-11-11 PROCEDURE — 27000191 HC C-V MONITORING

## 2018-11-11 PROCEDURE — 37000009 HC ANESTHESIA EA ADD 15 MINS: Performed by: TRANSPLANT SURGERY

## 2018-11-11 PROCEDURE — 81300031 HC LIVER TRANSPORT, FLIGHT WITHIN 301-700 MILES: Mod: NTX

## 2018-11-11 PROCEDURE — 85049 AUTOMATED PLATELET COUNT: CPT | Mod: 91

## 2018-11-11 PROCEDURE — 36415 COLL VENOUS BLD VENIPUNCTURE: CPT

## 2018-11-11 PROCEDURE — 84132 ASSAY OF SERUM POTASSIUM: CPT | Mod: 91

## 2018-11-11 PROCEDURE — 85014 HEMATOCRIT: CPT

## 2018-11-11 PROCEDURE — 87077 CULTURE AEROBIC IDENTIFY: CPT

## 2018-11-11 PROCEDURE — 27800505 HC CATH, RADIAL ARTERY KIT: Mod: NTX | Performed by: NURSE ANESTHETIST, CERTIFIED REGISTERED

## 2018-11-11 PROCEDURE — 88307 TISSUE EXAM BY PATHOLOGIST: CPT | Mod: 26,,, | Performed by: PATHOLOGY

## 2018-11-11 PROCEDURE — 36000930 HC OR TIME LEV VII 1ST 15 MIN: Performed by: TRANSPLANT SURGERY

## 2018-11-11 RX ORDER — CIPROFLOXACIN 2 MG/ML
400 INJECTION, SOLUTION INTRAVENOUS
Status: DISCONTINUED | OUTPATIENT
Start: 2018-11-11 | End: 2018-11-14

## 2018-11-11 RX ORDER — MIDAZOLAM HYDROCHLORIDE 1 MG/ML
INJECTION, SOLUTION INTRAMUSCULAR; INTRAVENOUS
Status: DISCONTINUED | OUTPATIENT
Start: 2018-11-11 | End: 2018-11-11

## 2018-11-11 RX ORDER — GLYCOPYRROLATE 0.2 MG/ML
INJECTION INTRAMUSCULAR; INTRAVENOUS
Status: DISCONTINUED | OUTPATIENT
Start: 2018-11-11 | End: 2018-11-11

## 2018-11-11 RX ORDER — VANCOMYCIN HCL IN 5 % DEXTROSE 1.5G/250ML
1500 PLASTIC BAG, INJECTION (ML) INTRAVENOUS
Status: COMPLETED | OUTPATIENT
Start: 2018-11-11 | End: 2018-11-11

## 2018-11-11 RX ORDER — HEPARIN SODIUM 1000 [USP'U]/ML
INJECTION, SOLUTION INTRAVENOUS; SUBCUTANEOUS
Status: DISCONTINUED | OUTPATIENT
Start: 2018-11-11 | End: 2018-11-11 | Stop reason: HOSPADM

## 2018-11-11 RX ORDER — ALBUMIN HUMAN 250 G/1000ML
SOLUTION INTRAVENOUS CONTINUOUS PRN
Status: DISCONTINUED | OUTPATIENT
Start: 2018-11-11 | End: 2018-11-11

## 2018-11-11 RX ORDER — HEPARIN SODIUM 5000 [USP'U]/ML
5000 INJECTION, SOLUTION INTRAVENOUS; SUBCUTANEOUS EVERY 8 HOURS
Status: DISCONTINUED | OUTPATIENT
Start: 2018-11-11 | End: 2018-11-30

## 2018-11-11 RX ORDER — MAGNESIUM SULFATE HEPTAHYDRATE 40 MG/ML
2 INJECTION, SOLUTION INTRAVENOUS ONCE
Status: COMPLETED | OUTPATIENT
Start: 2018-11-11 | End: 2018-11-11

## 2018-11-11 RX ORDER — MYCOPHENOLATE MOFETIL 200 MG/ML
1000 POWDER, FOR SUSPENSION ORAL 2 TIMES DAILY
Status: DISCONTINUED | OUTPATIENT
Start: 2018-11-11 | End: 2018-11-14

## 2018-11-11 RX ORDER — FENTANYL CITRATE 50 UG/ML
INJECTION, SOLUTION INTRAMUSCULAR; INTRAVENOUS
Status: DISCONTINUED | OUTPATIENT
Start: 2018-11-11 | End: 2018-11-11

## 2018-11-11 RX ORDER — FLUCONAZOLE 2 MG/ML
200 INJECTION, SOLUTION INTRAVENOUS
Status: DISCONTINUED | OUTPATIENT
Start: 2018-11-11 | End: 2018-11-18

## 2018-11-11 RX ORDER — PHENYLEPHRINE HYDROCHLORIDE 10 MG/ML
INJECTION INTRAVENOUS
Status: DISCONTINUED | OUTPATIENT
Start: 2018-11-11 | End: 2018-11-11

## 2018-11-11 RX ORDER — PREDNISONE 20 MG/1
20 TABLET ORAL DAILY
Status: DISCONTINUED | OUTPATIENT
Start: 2018-11-17 | End: 2018-11-17

## 2018-11-11 RX ORDER — HYDROCODONE BITARTRATE AND ACETAMINOPHEN 500; 5 MG/1; MG/1
TABLET ORAL
Status: DISCONTINUED | OUTPATIENT
Start: 2018-11-11 | End: 2018-11-13

## 2018-11-11 RX ORDER — FAMOTIDINE 10 MG/ML
20 INJECTION INTRAVENOUS DAILY
Status: DISCONTINUED | OUTPATIENT
Start: 2018-11-11 | End: 2018-11-19

## 2018-11-11 RX ORDER — SUCCINYLCHOLINE CHLORIDE 20 MG/ML
INJECTION INTRAMUSCULAR; INTRAVENOUS
Status: DISCONTINUED | OUTPATIENT
Start: 2018-11-11 | End: 2018-11-11

## 2018-11-11 RX ORDER — PROPOFOL 10 MG/ML
20 INJECTION, EMULSION INTRAVENOUS CONTINUOUS
Status: DISCONTINUED | OUTPATIENT
Start: 2018-11-11 | End: 2018-11-16

## 2018-11-11 RX ORDER — ALBUMIN HUMAN 50 G/1000ML
25 SOLUTION INTRAVENOUS ONCE
Status: DISCONTINUED | OUTPATIENT
Start: 2018-11-11 | End: 2018-11-16

## 2018-11-11 RX ORDER — DEXTROSE MONOHYDRATE AND SODIUM CHLORIDE 5; .45 G/100ML; G/100ML
INJECTION, SOLUTION INTRAVENOUS CONTINUOUS
Status: DISCONTINUED | OUTPATIENT
Start: 2018-11-11 | End: 2018-11-12

## 2018-11-11 RX ORDER — LIDOCAINE HCL/PF 100 MG/5ML
SYRINGE (ML) INTRAVENOUS
Status: DISCONTINUED | OUTPATIENT
Start: 2018-11-11 | End: 2018-11-11

## 2018-11-11 RX ORDER — HEPARIN SODIUM 1000 [USP'U]/ML
INJECTION, SOLUTION INTRAVENOUS; SUBCUTANEOUS
Status: DISCONTINUED | OUTPATIENT
Start: 2018-11-11 | End: 2018-11-11

## 2018-11-11 RX ORDER — FLUCONAZOLE 2 MG/ML
200 INJECTION, SOLUTION INTRAVENOUS ONCE
Status: COMPLETED | OUTPATIENT
Start: 2018-11-11 | End: 2018-11-11

## 2018-11-11 RX ORDER — METHYLPREDNISOLONE SOD SUCC 125 MG
60 VIAL (EA) INJECTION EVERY 12 HOURS
Status: COMPLETED | OUTPATIENT
Start: 2018-11-14 | End: 2018-11-14

## 2018-11-11 RX ORDER — SODIUM BICARBONATE 42 MG/ML
INJECTION, SOLUTION INTRAVENOUS
Status: DISCONTINUED | OUTPATIENT
Start: 2018-11-11 | End: 2018-11-11

## 2018-11-11 RX ORDER — METHYLPREDNISOLONE SOD SUCC 125 MG
80 VIAL (EA) INJECTION EVERY 12 HOURS
Status: COMPLETED | OUTPATIENT
Start: 2018-11-13 | End: 2018-11-13

## 2018-11-11 RX ORDER — LIDOCAINE HYDROCHLORIDE 40 MG/ML
INJECTION, SOLUTION RETROBULBAR
Status: DISCONTINUED | OUTPATIENT
Start: 2018-11-11 | End: 2018-11-11 | Stop reason: HOSPADM

## 2018-11-11 RX ORDER — VECURONIUM BROMIDE FOR INJECTION 1 MG/ML
INJECTION, POWDER, LYOPHILIZED, FOR SOLUTION INTRAVENOUS
Status: DISCONTINUED | OUTPATIENT
Start: 2018-11-11 | End: 2018-11-11

## 2018-11-11 RX ORDER — ALBUMIN HUMAN 50 G/1000ML
SOLUTION INTRAVENOUS CONTINUOUS PRN
Status: COMPLETED | OUTPATIENT
Start: 2018-11-11 | End: 2018-11-11

## 2018-11-11 RX ORDER — CIPROFLOXACIN 2 MG/ML
400 INJECTION, SOLUTION INTRAVENOUS
Status: DISCONTINUED | OUTPATIENT
Start: 2018-11-11 | End: 2018-11-11

## 2018-11-11 RX ORDER — DIPHENHYDRAMINE HYDROCHLORIDE 50 MG/ML
INJECTION INTRAMUSCULAR; INTRAVENOUS
Status: DISCONTINUED | OUTPATIENT
Start: 2018-11-11 | End: 2018-11-11

## 2018-11-11 RX ORDER — METHYLPREDNISOLONE SOD SUCC 125 MG
100 VIAL (EA) INJECTION EVERY 12 HOURS
Status: COMPLETED | OUTPATIENT
Start: 2018-11-12 | End: 2018-11-12

## 2018-11-11 RX ORDER — NYSTATIN 100000 [USP'U]/ML
500000 SUSPENSION ORAL
Status: DISCONTINUED | OUTPATIENT
Start: 2018-11-11 | End: 2018-11-12

## 2018-11-11 RX ORDER — ONDANSETRON 2 MG/ML
INJECTION INTRAMUSCULAR; INTRAVENOUS
Status: DISCONTINUED | OUTPATIENT
Start: 2018-11-11 | End: 2018-11-11

## 2018-11-11 RX ORDER — CISATRACURIUM BESYLATE 10 MG/ML
INJECTION, SOLUTION INTRAVENOUS
Status: DISCONTINUED | OUTPATIENT
Start: 2018-11-11 | End: 2018-11-11

## 2018-11-11 RX ORDER — NOREPINEPHRINE BITARTRATE/D5W 4MG/250ML
0.02 PLASTIC BAG, INJECTION (ML) INTRAVENOUS CONTINUOUS
Status: DISCONTINUED | OUTPATIENT
Start: 2018-11-11 | End: 2018-11-16

## 2018-11-11 RX ORDER — ALBUMIN HUMAN 50 G/1000ML
SOLUTION INTRAVENOUS
Status: DISPENSED
Start: 2018-11-11 | End: 2018-11-12

## 2018-11-11 RX ORDER — MANNITOL 250 MG/ML
INJECTION, SOLUTION INTRAVENOUS
Status: DISCONTINUED | OUTPATIENT
Start: 2018-11-11 | End: 2018-11-11

## 2018-11-11 RX ORDER — PROPOFOL 10 MG/ML
VIAL (ML) INTRAVENOUS
Status: DISCONTINUED | OUTPATIENT
Start: 2018-11-11 | End: 2018-11-11

## 2018-11-11 RX ORDER — VALGANCICLOVIR HYDROCHLORIDE 50 MG/ML
450 POWDER, FOR SOLUTION ORAL EVERY MORNING
Status: DISCONTINUED | OUTPATIENT
Start: 2018-11-21 | End: 2018-11-19

## 2018-11-11 RX ORDER — FUROSEMIDE 10 MG/ML
INJECTION INTRAMUSCULAR; INTRAVENOUS
Status: DISCONTINUED | OUTPATIENT
Start: 2018-11-11 | End: 2018-11-11

## 2018-11-11 RX ORDER — ALBUMIN HUMAN 50 G/1000ML
SOLUTION INTRAVENOUS CONTINUOUS PRN
Status: DISCONTINUED | OUTPATIENT
Start: 2018-11-11 | End: 2018-11-11

## 2018-11-11 RX ADMIN — AMPICILLIN SODIUM AND SULBACTAM SODIUM 3 G: 2; 1 INJECTION, POWDER, FOR SOLUTION INTRAMUSCULAR; INTRAVENOUS at 01:11

## 2018-11-11 RX ADMIN — Medication 1 MG: at 05:11

## 2018-11-11 RX ADMIN — SODIUM CHLORIDE, SODIUM GLUCONATE, SODIUM ACETATE, POTASSIUM CHLORIDE, MAGNESIUM CHLORIDE, SODIUM PHOSPHATE, DIBASIC, AND POTASSIUM PHOSPHATE: .53; .5; .37; .037; .03; .012; .00082 INJECTION, SOLUTION INTRAVENOUS at 06:11

## 2018-11-11 RX ADMIN — FAMOTIDINE 20 MG: 10 INJECTION, SOLUTION INTRAVENOUS at 01:11

## 2018-11-11 RX ADMIN — PROPOFOL 100 MG: 10 INJECTION, EMULSION INTRAVENOUS at 04:11

## 2018-11-11 RX ADMIN — NOREPINEPHRINE BITARTRATE 0.02 MCG/KG/MIN: 1 INJECTION, SOLUTION, CONCENTRATE INTRAVENOUS at 04:11

## 2018-11-11 RX ADMIN — SODIUM CHLORIDE, SODIUM GLUCONATE, SODIUM ACETATE, POTASSIUM CHLORIDE, MAGNESIUM CHLORIDE, SODIUM PHOSPHATE, DIBASIC, AND POTASSIUM PHOSPHATE: .53; .5; .37; .037; .03; .012; .00082 INJECTION, SOLUTION INTRAVENOUS at 02:11

## 2018-11-11 RX ADMIN — CALCIUM CHLORIDE 500 MG: 100 INJECTION, SOLUTION INTRAVENOUS at 05:11

## 2018-11-11 RX ADMIN — CALCIUM CHLORIDE 500 MG: 100 INJECTION, SOLUTION INTRAVENOUS at 06:11

## 2018-11-11 RX ADMIN — PHENYLEPHRINE HYDROCHLORIDE 100 MCG: 10 INJECTION INTRAVENOUS at 02:11

## 2018-11-11 RX ADMIN — VASOPRESSIN 2 UNITS: 20 INJECTION, SOLUTION INTRAMUSCULAR; SUBCUTANEOUS at 02:11

## 2018-11-11 RX ADMIN — CALCIUM CHLORIDE 500 MG: 100 INJECTION, SOLUTION INTRAVENOUS at 09:11

## 2018-11-11 RX ADMIN — VASOPRESSIN 2 UNITS: 20 INJECTION, SOLUTION INTRAMUSCULAR; SUBCUTANEOUS at 03:11

## 2018-11-11 RX ADMIN — VECURONIUM BROMIDE FOR INJECTION 5 MG: 1 INJECTION, POWDER, LYOPHILIZED, FOR SOLUTION INTRAVENOUS at 09:11

## 2018-11-11 RX ADMIN — PHYTONADIONE 10 MG: 10 INJECTION, EMULSION INTRAMUSCULAR; INTRAVENOUS; SUBCUTANEOUS at 01:11

## 2018-11-11 RX ADMIN — FENTANYL CITRATE 150 MCG: 50 INJECTION, SOLUTION INTRAMUSCULAR; INTRAVENOUS at 09:11

## 2018-11-11 RX ADMIN — CALCIUM CHLORIDE 1000 MG: 100 INJECTION, SOLUTION INTRAVENOUS at 07:11

## 2018-11-11 RX ADMIN — VASOPRESSIN 2 UNITS: 20 INJECTION, SOLUTION INTRAMUSCULAR; SUBCUTANEOUS at 07:11

## 2018-11-11 RX ADMIN — DEXTROSE AND SODIUM CHLORIDE: 5; .45 INJECTION, SOLUTION INTRAVENOUS at 02:11

## 2018-11-11 RX ADMIN — VECURONIUM BROMIDE FOR INJECTION 5 MG: 1 INJECTION, POWDER, LYOPHILIZED, FOR SOLUTION INTRAVENOUS at 06:11

## 2018-11-11 RX ADMIN — CISATRACURIUM BESYLATE 6 MG: 10 INJECTION INTRAVENOUS at 05:11

## 2018-11-11 RX ADMIN — MIDAZOLAM HYDROCHLORIDE 1 MG: 1 INJECTION, SOLUTION INTRAMUSCULAR; INTRAVENOUS at 09:11

## 2018-11-11 RX ADMIN — HEPARIN SODIUM 5000 UNITS: 5000 INJECTION, SOLUTION INTRAVENOUS; SUBCUTANEOUS at 09:11

## 2018-11-11 RX ADMIN — VASOPRESSIN 3 UNITS: 20 INJECTION, SOLUTION INTRAMUSCULAR; SUBCUTANEOUS at 03:11

## 2018-11-11 RX ADMIN — CISATRACURIUM BESYLATE 2 MG: 10 INJECTION INTRAVENOUS at 01:11

## 2018-11-11 RX ADMIN — SODIUM CHLORIDE, SODIUM GLUCONATE, SODIUM ACETATE, POTASSIUM CHLORIDE, MAGNESIUM CHLORIDE, SODIUM PHOSPHATE, DIBASIC, AND POTASSIUM PHOSPHATE: .53; .5; .37; .037; .03; .012; .00082 INJECTION, SOLUTION INTRAVENOUS at 08:11

## 2018-11-11 RX ADMIN — VASOPRESSIN 2 UNITS: 20 INJECTION, SOLUTION INTRAMUSCULAR; SUBCUTANEOUS at 08:11

## 2018-11-11 RX ADMIN — SUCCINYLCHOLINE CHLORIDE 180 MG: 20 INJECTION, SOLUTION INTRAMUSCULAR; INTRAVENOUS at 01:11

## 2018-11-11 RX ADMIN — FENTANYL CITRATE 100 MCG: 50 INJECTION, SOLUTION INTRAMUSCULAR; INTRAVENOUS at 10:11

## 2018-11-11 RX ADMIN — CALCIUM CHLORIDE 500 MG: 100 INJECTION, SOLUTION INTRAVENOUS at 07:11

## 2018-11-11 RX ADMIN — DIPHENHYDRAMINE HYDROCHLORIDE 50 MG: 50 INJECTION, SOLUTION INTRAMUSCULAR; INTRAVENOUS at 04:11

## 2018-11-11 RX ADMIN — SODIUM CHLORIDE, SODIUM GLUCONATE, SODIUM ACETATE, POTASSIUM CHLORIDE, MAGNESIUM CHLORIDE, SODIUM PHOSPHATE, DIBASIC, AND POTASSIUM PHOSPHATE: .53; .5; .37; .037; .03; .012; .00082 INJECTION, SOLUTION INTRAVENOUS at 01:11

## 2018-11-11 RX ADMIN — SODIUM CHLORIDE 0.25 MCG/KG/MIN: 9 INJECTION, SOLUTION INTRAVENOUS at 03:11

## 2018-11-11 RX ADMIN — SODIUM BICARBONATE 50 MEQ: 42 INJECTION, SOLUTION INTRAVENOUS at 07:11

## 2018-11-11 RX ADMIN — CISATRACURIUM BESYLATE 6 MG: 10 INJECTION INTRAVENOUS at 03:11

## 2018-11-11 RX ADMIN — PHYTONADIONE 10 MG: 10 INJECTION, EMULSION INTRAMUSCULAR; INTRAVENOUS; SUBCUTANEOUS at 09:11

## 2018-11-11 RX ADMIN — ALBUMIN (HUMAN): 12.5 SOLUTION INTRAVENOUS at 02:11

## 2018-11-11 RX ADMIN — FENTANYL CITRATE 100 MCG: 50 INJECTION, SOLUTION INTRAMUSCULAR; INTRAVENOUS at 01:11

## 2018-11-11 RX ADMIN — FUROSEMIDE 84 MG: 10 INJECTION, SOLUTION INTRAMUSCULAR; INTRAVENOUS at 07:11

## 2018-11-11 RX ADMIN — LIDOCAINE HYDROCHLORIDE 100 MG: 20 INJECTION, SOLUTION INTRAVENOUS at 01:11

## 2018-11-11 RX ADMIN — GLYCOPYRROLATE 0.2 MG: 0.2 INJECTION, SOLUTION INTRAMUSCULAR; INTRAVENOUS at 04:11

## 2018-11-11 RX ADMIN — NYSTATIN 500000 UNITS: 500000 SUSPENSION ORAL at 07:11

## 2018-11-11 RX ADMIN — VANCOMYCIN HYDROCHLORIDE 1500 MG: 10 INJECTION, POWDER, LYOPHILIZED, FOR SOLUTION INTRAVENOUS at 02:11

## 2018-11-11 RX ADMIN — SODIUM CHLORIDE, SODIUM GLUCONATE, SODIUM ACETATE, POTASSIUM CHLORIDE, MAGNESIUM CHLORIDE, SODIUM PHOSPHATE, DIBASIC, AND POTASSIUM PHOSPHATE: .53; .5; .37; .037; .03; .012; .00082 INJECTION, SOLUTION INTRAVENOUS at 07:11

## 2018-11-11 RX ADMIN — SODIUM CHLORIDE, SODIUM GLUCONATE, SODIUM ACETATE, POTASSIUM CHLORIDE, MAGNESIUM CHLORIDE, SODIUM PHOSPHATE, DIBASIC, AND POTASSIUM PHOSPHATE: .53; .5; .37; .037; .03; .012; .00082 INJECTION, SOLUTION INTRAVENOUS at 05:11

## 2018-11-11 RX ADMIN — FLUCONAZOLE, SODIUM CHLORIDE 200 MG: 2 INJECTION INTRAVENOUS at 04:11

## 2018-11-11 RX ADMIN — CISATRACURIUM BESYLATE 8 MG: 10 INJECTION INTRAVENOUS at 04:11

## 2018-11-11 RX ADMIN — CISATRACURIUM BESYLATE 6 MG: 10 INJECTION INTRAVENOUS at 04:11

## 2018-11-11 RX ADMIN — FENTANYL CITRATE 150 MCG: 50 INJECTION, SOLUTION INTRAMUSCULAR; INTRAVENOUS at 04:11

## 2018-11-11 RX ADMIN — VECURONIUM BROMIDE FOR INJECTION 5 MG: 1 INJECTION, POWDER, LYOPHILIZED, FOR SOLUTION INTRAVENOUS at 05:11

## 2018-11-11 RX ADMIN — CIPROFLOXACIN 400 MG: 2 INJECTION, SOLUTION INTRAVENOUS at 05:11

## 2018-11-11 RX ADMIN — VASOPRESSIN 2 UNITS/HR: 20 INJECTION INTRAVENOUS at 03:11

## 2018-11-11 RX ADMIN — CALCIUM CHLORIDE 500 MG: 100 INJECTION, SOLUTION INTRAVENOUS at 04:11

## 2018-11-11 RX ADMIN — CISATRACURIUM BESYLATE 18 MG: 10 INJECTION INTRAVENOUS at 02:11

## 2018-11-11 RX ADMIN — VECURONIUM BROMIDE FOR INJECTION 5 MG: 1 INJECTION, POWDER, LYOPHILIZED, FOR SOLUTION INTRAVENOUS at 07:11

## 2018-11-11 RX ADMIN — MAGNESIUM SULFATE IN WATER 2 G: 40 INJECTION, SOLUTION INTRAVENOUS at 03:11

## 2018-11-11 RX ADMIN — METHYLPREDNISOLONE SODIUM SUCCINATE 500 MG: 500 INJECTION, POWDER, FOR SOLUTION INTRAMUSCULAR; INTRAVENOUS at 03:11

## 2018-11-11 RX ADMIN — AMPICILLIN SODIUM AND SULBACTAM SODIUM 3 G: 2; 1 INJECTION, POWDER, FOR SOLUTION INTRAMUSCULAR; INTRAVENOUS at 06:11

## 2018-11-11 RX ADMIN — Medication 1000 MG: at 10:11

## 2018-11-11 RX ADMIN — HEPARIN SODIUM 2500 UNITS: 1000 INJECTION, SOLUTION INTRAVENOUS; SUBCUTANEOUS at 06:11

## 2018-11-11 RX ADMIN — ALBUMIN (HUMAN): 12.5 SOLUTION INTRAVENOUS at 05:11

## 2018-11-11 RX ADMIN — FUROSEMIDE 84 MG: 10 INJECTION, SOLUTION INTRAMUSCULAR; INTRAVENOUS at 03:11

## 2018-11-11 RX ADMIN — SODIUM CHLORIDE, SODIUM GLUCONATE, SODIUM ACETATE, POTASSIUM CHLORIDE, MAGNESIUM CHLORIDE, SODIUM PHOSPHATE, DIBASIC, AND POTASSIUM PHOSPHATE: .53; .5; .37; .037; .03; .012; .00082 INJECTION, SOLUTION INTRAVENOUS at 10:11

## 2018-11-11 RX ADMIN — ALBUMIN (HUMAN): 12.5 SOLUTION INTRAVENOUS at 06:11

## 2018-11-11 RX ADMIN — PROPOFOL 100 MG: 10 INJECTION, EMULSION INTRAVENOUS at 01:11

## 2018-11-11 RX ADMIN — MIDAZOLAM HYDROCHLORIDE 2 MG: 1 INJECTION, SOLUTION INTRAMUSCULAR; INTRAVENOUS at 01:11

## 2018-11-11 RX ADMIN — ALBUMIN (HUMAN): 25 SOLUTION INTRAVENOUS at 04:11

## 2018-11-11 RX ADMIN — NYSTATIN 500000 UNITS: 500000 SUSPENSION ORAL at 02:11

## 2018-11-11 RX ADMIN — SODIUM CHLORIDE 1 UNITS/HR: 9 INJECTION, SOLUTION INTRAVENOUS at 03:11

## 2018-11-11 RX ADMIN — PROPOFOL 45 MCG/KG/MIN: 10 INJECTION, EMULSION INTRAVENOUS at 09:11

## 2018-11-11 RX ADMIN — MANNITOL 84 ML: 250 INJECTION, SOLUTION INTRAVENOUS at 07:11

## 2018-11-11 RX ADMIN — CALCIUM CHLORIDE 500 MG: 100 INJECTION, SOLUTION INTRAVENOUS at 11:11

## 2018-11-11 RX ADMIN — MANNITOL 84 ML: 250 INJECTION, SOLUTION INTRAVENOUS at 03:11

## 2018-11-11 RX ADMIN — Medication 0.06 MCG/KG/MIN: at 08:11

## 2018-11-11 NOTE — PROGRESS NOTES
CRRT NOTES    Intra op crrt started. Arterial line conencted to femoral catheter and venous line to permcath right ij catheter. All lumens of both catheter with good flows.    Na - 136; Hco3 - 25; K - 2.0; Ca-.5    Orders per anesth

## 2018-11-11 NOTE — OR NURSING
Donor vessels walked to blood bank at end of procedure by SHARRON Yang RN with appropriate paperwork in ice chest. Blood tubes and nodes placed in ice chest before being taken to blood bank.     Bx sent to pathology with specimen

## 2018-11-11 NOTE — OP NOTE
Operative Report    Date of Procedure: 11/11/2018    Surgeons:  Surgeon(s) and Role:     * Shmuel Leary MD - Primary    First Assistant Attestation:  The presence of an additional attending surgeon functioning as first assistant was required due to the complexity of the procedure relative to any available residents. I certify that no resident was available who was qualified to serve as first assistant. Duties performed by the assistant included assisting the primary surgeon.    Pre-operative Diagnosis (UNOS): End Stage Liver Disease due to Alcoholic Cirrhosis,      Post-operative Diagnosis: Same; portal vein status:  patent    Principal Procedure Perfomed: Orthotopic Liver Transplant  (whole liver, DBD donor, biliary reconstruction end to end donor common bile duct to recipient common hepatic duct  )  Additional Procedures Perfomed: none    Anesthesia: General endotracheal  Findings: Hepatomegaly, cirrhotic cholestatic liver, with severe portal hypertension and severe vascularization and collateralization of the retroperitoneum and bare area. Vascularized adhesions between liver and abdominal wall and omentum.   Ascites.     Preamble  Indications and Patient Counseling: The patient is a 53 y.o. year-old male with Alcoholic Cirrhosis,  (UNOS terminology) who has been evaluated for a liver transplant.  The procedure was thoroughly discussed with the patient, including potential risks, complications, and alternatives. Specific complications mentioned included death, graft non-function, bleeding, infection, rejection, renal failure, respiratory failure, and neurologic deficits, as well as the possibility of other complications not specifically mentioned.    Donor Risk Factors:  Prior to the operation, the patient was advised of any donor-specific risk factors requiring specific disclosure. Factors in this case included nothing that required specific disclosure      Specific PHS Increased Risk Behavior criteria  for the organ donor include:  None    All questions were answered, the patient voiced appropriate understanding, and he agreed to proceed with the planned procedure.    ABO Confirmation: Immediately following arrival of the donor organ and prior to implantation, a formal ABO confirmation was done according to hospital and UNOS policies.  I confirmed the UNOS ID number of the donor organ ([id number]) and the donor and recipient ABO types, directly verifying these data by comparison with the UNOS Match Run report ([id number].  This confirmation was personally done by an attending surgeon and circulating nurse, and is officially documented elsewhere.    Time-Out: A complete time out was carried out prior to incision, with confirmation of patient identity, correct procedure, correct operative site, appropriate antibiotic prophylaxis, review of any known allergies, and presence of all needed equipment.    Procedure in Detail  Following the induction of general endotracheal anesthesia, appropriate arterial and venous lines were placed by the anesthesia team.  Care was taken to pad all pressure points and avoid any potential traction injuries from positioning. The urinary bladder was catheterized.  Sequential compression boots and Sang Huggers were used. The chest, abdomen, and upper thighs were sterilely prepped and draped.     The abdomen was entered via a wide bilateral subcostal incision. 2000 mL of ascites was removed. The round ligament was ligated and divided. The falciform, left triangular, and gastrohepatic ligaments were divided using the electocautery, with 2-0 silk ties as needed. The Ro self-retaining retractor was placed to provide exposure. Adhesions between the abdominal viscera and the abdominal wall and the undersurface of the liver were divided as needed using cautery dissection and silk ties or suture ligatures. Severe portal hypertension  at the hilum. Hilar dissection was then carried out, with  ligation of the right and left hepatic arteries as well as the cystic duct and the common hepatic duct. The recipient arterial anatomy was found to be: standard. The portal vein was exposed circumferentially from its bifurcation down to the superior border of the pancreas. The portal vein was found to be patent.  Attention was next directed to the right side of the liver where the right triangular ligament was taken down, exposing the bare area of the liver, and the IVC. Severe collateralization, adrenal gland firmly ahdered to the liver. The infrahepatic IVC was isolated, followed by mobilization of the retrohepatic cava, division of the right adrenal vein, and isolation of the suprahepatic IVC. The patient was given 2500 units of heparin prior to caval cross-clamping. . After assuring adequate stability after cross-clamping, the native liver was excised and the allograft liver was brought to the operative field.  The liver was perfused with cold 5% albumin during implantation to displace the organ preservation solution.  IVC reconstruction technique consisted of end to end ivc using 3-0 and 4-0 polypropylene as appropriate.  The donor portal vein was then anastomosed to the recipient portal inflow site (end portal vein) with 5-0 polypropylene. Once this was completed, anesthesia was notified and the liver was re-perfused. Copious irrigation with warm saline and temporary finger occlusion of portal inflow were used as needed to avoid any problems with hypothermia. Temporary packing, electocautery, and polypropylene suture ligatures were used as appropriate to provide hemostasis. Once this was satisfactory, attention was directed to the arterial reconstruction. This liver did not come from a DCD donor. Arterial inflow was provided to the graft by anastomosing the recipient proper hepatic artery to the donor  common hepatic artery using 6-0 polypropylene. The liver was assessed for adequacy of perfusion, which was  satisfactory. The gallbladder was then removed from the allograft liver, and a temporary packing period was carried out to help assure hemostatis. Attention was next directed toward the bile duct. The donor bile duct was prepared and assessed for adequate vascular supply. Biliary reconstruction was then performed by anastomosing the recipient common hepatic duct to the donor duct using 6-0 PDS sutures.  The biliary stent used was: none.  A final check for hemostasis was made. Bare area and adrenal gland required several stiches and packing to obatin fair hemostasis. The bare area was closed.  3 19f Sarkis drains were placed through separate incisions below the transverse abdominal incision and placed in the usual positions. The cavity was irrigated with saline. The abdomen was closed in layers using running #1 PDS suture. The incision was irrigated and the skin was closed with staples. At the end of the case all instrument, needle, and sponge counts were correct. The patient was taken to the ICU in good condition.     Fluids Administered:   Crystalloid (mL) 8500   RBC (Units) 7   FFP (Units) 11   Cryoprecipitate (Units)  2   Platelets (Units) 3   Cell Saver (CCs) 5000   Albumin (Units) 2000      Specimens: Explant liver  Drains: 19f Sarkis drains x 3    Additional Findings:    Estimated Blood Loss: 89159 mL  Ascites Evacuated: 2000 mL    Ischemic Times:  Time of portal reperfusion: 11/11/2018  7:23 AM  Time of arterial reperfusion: 11/11/2018  7:57 AM  Anastomosis (warm ischemia) time: 39 minutes  Cold ischemia time: 441 minutes    Surgical Data:  Graft type (whole vs. partial): whole liver  IVC reconstruction: end to end ivc  Portal vein status: patent  Portal graft performed? no  Donor arterial anatomy: standard  Donor arterial inflow: common hepatic artery  Recipient arterial anatomy: standard  Recipient arterial inflow: proper hepatic artery  Arterial graft performed? no  Biliary reconstruction: end to end (donor  common bile duct to recipient common hepatic duct)  Biliary stent: none  Disposition of Vessels from donor of transplanted organ: sent to blood bank  Damage during procurement: no  Procurement damage comments: none    Donor Factors:  UNOS ID: )[id number]  UNOS Match Run: [id number]  Donor type:  - brain death  Donor with reported PHS increased risk behavior: no  Donor CMV serologic status: Positive  Donor with known cancer: no  Donor HCV serologic status: Negative  Donor HBcAb: Negative  Donor HBV JAVIER: Negative  Donor HCV JAVIER: Negative  Liver weight: 1079 grams

## 2018-11-11 NOTE — SUBJECTIVE & OBJECTIVE
Follow-up For: Procedure(s) (LRB):  TRANSPLANT, LIVER (N/A)    Post-Operative Day: Day of Surgery     Past Medical History:   Diagnosis Date    Liver cirrhosis, alcoholic 09/30/2018       Past Surgical History:   Procedure Laterality Date    EGD (ESOPHAGOGASTRODUODENOSCOPY) N/A 11/6/2018    Performed by Duarte Jules MD at [facility] ENDO (2ND FLR)    ESOPHAGOGASTRODUODENOSCOPY N/A 11/6/2018    Procedure: EGD (ESOPHAGOGASTRODUODENOSCOPY);  Surgeon: Duarte Jules MD;  Location: [facility] ENDO (2ND FLR);  Service: Endoscopy;  Laterality: N/A;    INSERTION OF TUNNELED CENTRAL VENOUS HEMODIALYSIS CATHETER N/A 11/7/2018    Procedure: INSERTION, CATHETER, CENTRAL VENOUS, HEMODIALYSIS, TUNNELED;  Surgeon: Brock Gonzalez MD;  Location: [facility] CATH LAB;  Service: Nephrology;  Laterality: N/A;    INSERTION, CATHETER, CENTRAL VENOUS, HEMODIALYSIS, TUNNELED N/A 11/7/2018    Performed by Brock Gonzalez MD at [facility] CATH LAB       Review of patient's allergies indicates:   Allergen Reactions    Cefepime Rash    Penicillins Nausea And Vomiting and Rash     Full body rash from cefepime as well       Family History     None        Tobacco Use    Smoking status: Never Smoker   Substance and Sexual Activity    Alcohol use: Not on file    Drug use: Not on file    Sexual activity: Not on file      Review of Systems   Unable to perform ROS: Acuity of condition     Objective:     Vital Signs (Most Recent):  Temp: 98.1 °F (36.7 °C) (11/11/18 1600)  Pulse: 86 (11/11/18 1630)  Resp: 11 (11/11/18 1630)  BP: (!) 61/43 (11/11/18 1608)  SpO2: 100 % (11/11/18 1630) Vital Signs (24h Range):  Temp:  [97.9 °F (36.6 °C)-98.6 °F (37 °C)] 98.1 °F (36.7 °C)  Pulse:  [75-89] 86  Resp:  [3-22] 11  SpO2:  [95 %-100 %] 100 %  BP: ()/(43-64) 61/43  Arterial Line BP: ()/(38-58) 102/45     Weight: 84.4 kg (186 lb 1.1 oz)  Body mass index is 26.7 kg/m².      Intake/Output Summary (Last 24 hours) at 11/11/2018 8956  Last data filed at  11/11/2018 1600  Gross per 24 hour   Intake 44693 ml   Output 86088 ml   Net 4631 ml       Physical Exam   Eyes:   +jaundice   Cardiovascular: Normal rate, regular rhythm and intact distal pulses.   Pulmonary/Chest:   Coarse BS b/l   Abdominal:   3 JOSE A drain with SS output  Chevron incision c/d/i   Musculoskeletal: He exhibits edema.   Neurological:   Intubated, sedated   Skin:   Diffuse jaundice       Vents:  Vent Mode: SIMV (11/11/18 1608)  Ventilator Initiated: Yes (11/11/18 1215)  Set Rate: 16 bmp (11/11/18 1608)  Vt Set: 500 mL (11/11/18 1608)  Pressure Support: 10 cmH20 (11/11/18 1608)  PEEP/CPAP: 5 cmH20 (11/11/18 1608)  Oxygen Concentration (%): 50 (11/11/18 1630)  Peak Airway Pressure: 22 cmH2O (11/11/18 1608)  Plateau Pressure: 0 cmH20 (11/11/18 1608)  Total Ve: 8.32 mL (11/11/18 1608)  F/VT Ratio<105 (RSBI): (!) 30.71 (11/11/18 1608)    Lines/Drains/Airways     Central Venous Catheter Line                 Tunneled Central Line Insertion/Assessment - Double Lumen  11/07/18 1350 right internal jugular 4 days         Percutaneous Central Line Insertion/Assessment - double lumen  11/11/18 0205 right femoral less than 1 day         Pulmonary Artery Catheter Assessment  11/11/18 0236 less than 1 day          Drain                 Closed/Suction Drain 11/11/18 1124 Right Abdomen Bulb 19 Fr. less than 1 day         Closed/Suction Drain 11/11/18 1127 Right Abdomen Bulb 19 Fr. less than 1 day         Closed/Suction Drain 11/11/18 1128 Left Abdomen Bulb 19 Fr. less than 1 day         Urethral Catheter 11/11/18 0246 Straight-tip;Non-latex 16 Fr. less than 1 day          Airway                 Airway - Non-Surgical 11/11/18 0158 Endotracheal Tube less than 1 day          Arterial Line                 Arterial Line 11/11/18 0159 Left Radial less than 1 day         Arterial Line 11/11/18 0205 Right Femoral less than 1 day          Peripheral Intravenous Line                 Peripheral IV - Single Lumen 11/09/18 1700  Right Antecubital 1 day         Peripheral IV - Single Lumen 11/11/18 0212 Left Antecubital less than 1 day                Significant Labs:    CBC/Anemia Profile:  Recent Labs   Lab 11/10/18  0444 11/10/18  2317 11/11/18  0205 11/11/18  0802 11/11/18  1222   WBC 14.68* 14.44*  --   --   --    HGB 10.4* 10.1* 9.6* 7.9*  --    HCT 30.7* 29.7* 27.2* 23.2* 21*   PLT 78* 82* 77* 33*  --    * 100*  --   --   --    RDW 18.4* 18.2*  --   --   --         Chemistries:  Recent Labs   Lab 11/10/18  0444 11/10/18  2317 11/11/18  0205 11/11/18  0802    136 133* 136   K 5.0 5.1 4.5 3.0*    101  --   --    CO2 23 24  --   --    BUN 28* 39*  --   --    CREATININE 5.1* 6.4*  --   --    CALCIUM 8.5* 8.9  --   --    ALBUMIN 2.3* 2.2* 2.1* 1.7*   PROT 5.1* 5.2*  --   --    BILITOT 37.8* 37.2*  --   --    ALKPHOS 238* 228*  --   --    ALT 28 28  --   --    AST 80* 70*  --   --    MG 1.8 1.5* 1.8 1.6   PHOS 4.0  --   --   --        All pertinent labs within the past 24 hours have been reviewed.    Significant Imaging: I have reviewed all pertinent imaging results/findings within the past 24 hours.

## 2018-11-11 NOTE — OP NOTE
Certification of Assistant at Surgery       Surgery Date: 11/11/2018     Participating Surgeons:  Surgeon(s) and Role:     * Shmuel Leary MD - Primary    Procedures:  Procedure(s) (LRB):  TRANSPLANT, LIVER (N/A)    Assistant Surgeon's Certification of Necessity:  I understand that section 1842 (b) (6) (d) of the Social Security Act generally prohibits Medicare Part B reasonable charge payment for the services of assistants at surgery in teaching hospitals when qualified residents are available to furnish such services. I certify that the services for which payment is claimed were medically necessary, and that no qualified resident was available to perform the services. I further understand that these services are subject to post-payment review by the Medicare carrier.      Amadou Lester Jr, MD    11/11/2018  11:14 AM

## 2018-11-11 NOTE — OP NOTE
Operative Report    Date of Procedure: 11/11/2018    Surgeon: Amadou Lester Jr, MD  First Assistant: none    Pre-operative Diagnosis: Allograft liver for transplantation  Post-operative Diagnosis: Same    Procedure(s) Performed:   1. Back Table Preparation of Liver with needle biopsy .    Anesthesia: Not applicable  Estimated Blood Loss: Not applicable  Fluids Administered: Not applicable    Findings: Estimated steatosis 0-10%, normal vascular anatomy.  Drains: Not applicable  Specimens: Core biopsy of allograft liver      Preamble  Indications: This report describes only the backbench preparation of the liver prior to transplantation.  The transplant operation itself is described in a separate report.    ABO Confirmation: Immediately following arrival of the donor organ and prior to implantation, a formal ABO confirmation was done according to hospital and UNOS policies.  I confirmed the UNOS ID number of the donor organ and the donor and recipient ABO types, directly verifying these data by comparison with the UNOS Match Run report.  This confirmation was personally done by an attending surgeon and circulating nurse, and is officially documented elsewhere.    Time-Out: A complete time out was carried out prior to the procedure, with confirmation of patient identity, correct procedure, correct operative site, appropriate antibiotic prophylaxis, review of any known allergies, and presence of all needed equipment.    Procedure in Detail  The liver was recovered from the transport cooler and inspected for vascular anatomy and overall suitability for transplantation, with findings as noted above.  The remnant of the diaphragm was dissected away.  The phrenic veins and adrenal vein were identified and ligated or sutured as appropriate.  The portal vein and hepatic artery were mobilized toward the hilum of the liver, and extrahepatic branches were ligated.  No vascular reconstruction was required.  The vessels were  tested for leaks.  A needle biopsy was obtained for permanent section histology using a spring-loaded biopsy device. The liver was kept in ice temperature organ preservation solution until the time of implantation.

## 2018-11-11 NOTE — SUBJECTIVE & OBJECTIVE
Interval HPI:   Received in ICU. Remains intubated and sedated. BG at goal without insulin. Solumedrol 500 mg; standard steroid taper.   Eating:   NPO  Nausea: No  Hypoglycemia and intervention: No  Fever: No  TPN and/or TF: No    PMH, PSH, FH, SH  reviewed       Review of Systems   Unable to obtain due to: Sedation,Intubation,Altered mental status,Critical illness,Reviewed ROS from note dated 11/10/18 per Dr. Sharma.    Current Medications and/or Treatments Impacting Glycemic Control  Immunotherapy:    Immunosuppressants         Stop Route Frequency     tacrolimus (PROGRAF) 1 mg/mL oral syringe      -- Oral 2 times daily     mycophenolate mofetil 200 mg/mL suspension 1,000 mg      -- Oral 2 times daily        Steroids:   Hormones (From admission, onward)    Start     Stop Route Frequency Ordered    11/17/18 0900  predniSONE tablet 20 mg  (methylprednisolone taper panel)      -- Oral Daily 11/11/18 1208    11/16/18 0900  methylPREDNISolone sodium succinate injection 20 mg  (methylprednisolone taper panel)      11/17 0859 IV Every 12 hours 11/11/18 1208    11/15/18 0900  methylPREDNISolone sodium succinate injection 40 mg  (methylprednisolone taper panel)      11/16 0859 IV Every 12 hours 11/11/18 1208    11/14/18 0900  methylPREDNISolone sodium succinate injection 60 mg  (methylprednisolone taper panel)      11/15 0859 IV Every 12 hours 11/11/18 1208    11/13/18 0900  methylPREDNISolone sodium succinate injection 80 mg  (methylprednisolone taper panel)      11/14 0859 IV Every 12 hours 11/11/18 1208    11/12/18 0900  methylPREDNISolone sodium succinate injection 100 mg  (methylprednisolone taper panel)      11/13 0859 IV Every 12 hours 11/11/18 1208    11/09/18 1345  methylPREDNISolone sodium succinate injection 500 mg  (Med - Immunosuppression Induction Therapy (Methylprednisolone))      11/10 0144 IV Once pre-op 11/09/18 1236        Pressors:    Autonomic Drugs (From admission, onward)    None         Hyperglycemia/Diabetes Medications:   Antihyperglycemics (From admission, onward)    None             PHYSICAL EXAMINATION:  Vitals:    11/11/18 1215   BP:    Pulse: 79   Resp: 16   Temp:      Body mass index is 26.7 kg/m².    Physical Exam   Constitutional: Well developed, well nourished, NAD.  ENT: External ears no masses with nose patent; normal hearing.  Neck: Supple; trachea midline.  Cardiovascular: Normal heart sounds, no LE edema. DP +2 bilaterally.  Lungs: Intubated on ventilator, lungs anterior bilaterally clear to auscultation.  Abdomen: Soft, no masses, no hernias. Hypoactive bowel sounds.   MS: No clubbing or cyanosis of nails noted;  unable to assess gait.  Skin: No rashes, lesions, or ulcers; no nodules. Chevron abdominal incision with dressing; JOSE A x2. Jaundice.   Psychiatric: DORIAN  Neurological: DORIAN  Foot: no amputations noted.

## 2018-11-11 NOTE — ANESTHESIA PROCEDURE NOTES
Arterial    Diagnosis: esld    Patient location during procedure: done in OR  Procedure start time: 11/11/2018 2:05 AM  Timeout: 11/11/2018 2:02 AM  Procedure end time: 11/11/2018 2:31 AM  Staffing  Anesthesiologist: Richard Fontana MD  Performed: anesthesiologist   Anesthesiologist was present at the time of the procedure.  Preanesthetic Checklist  Completed: patient identified, site marked, surgical consent, pre-op evaluation, timeout performed, IV checked, risks and benefits discussed, monitors and equipment checked and anesthesia consent givenArterial  Skin Prep: chlorhexidine gluconate and isopropyl alcohol  Orientation: right  Location: femoral  Catheter Size: 20 G  Catheter placement by Ultrasound guidance. Heme positive aspiration all ports.  Vessel Caliber: large, patent, compressibility normal  Needle advanced into vessel with real time Ultrasound guidance.  Guidewire confirmed in vessel.  Sterile sheath used.Insertion Attempts: 3  Assessment  Dressing: sutured in place and taped and tegaderm  Patient: Tolerated well  Additional Notes  Difficulty with guidewire passage on first two attempts.

## 2018-11-11 NOTE — TRANSFER OF CARE
"Anesthesia Transfer of Care Note    Patient: Femi Enciso    Procedure(s) Performed: Procedure(s) (LRB):  TRANSPLANT, LIVER (N/A)    Patient location: ICU    Anesthesia Type: general    Transport from OR: Continuos invasive BP monitoring in transport. Continuous SpO2 monitoring in transport. Continuous ECG monitoring in transport. Upon arrival to PACU/ICU, patient attached to ventilator and auscultated to confirm bilateral breath sounds and adequate TV    Post pain: adequate analgesia    Post assessment: no apparent anesthetic complications    Post vital signs: stable    Level of consciousness: sedated    Nausea/Vomiting: no nausea/vomiting    Complications: none    Transfer of care protocol was followed      Last vitals:   Visit Vitals  /64   Pulse 80   Temp 36.6 °C (97.9 °F) (Oral)   Resp 16   Ht 5' 10" (1.778 m)   Wt 84.4 kg (186 lb 1.1 oz)   SpO2 99%   BMI 26.70 kg/m²     "

## 2018-11-11 NOTE — ANESTHESIA PROCEDURE NOTES
[place]    Diagnosis: esld  Patient location during procedure: done in OR  Procedure start time: 11/11/2018 2:36 AM  Timeout: 11/11/2018 2:02 AM  Procedure end time: 11/11/2018 2:57 AM  Staffing  Anesthesiologist: Richard Fontana MD  Performed: anesthesiologist   Anesthesiologist was present at the time of the procedure.  Preanesthetic Checklist  Completed: patient identified, site marked, surgical consent, pre-op evaluation, timeout performed, IV checked, risks and benefits discussed, monitors and equipment checked and anesthesia consent given  [place]  Skin Prep: chlorhexidine gluconate and isopropyl alcohol  Local Infiltration: none  Location: right,  internal jugular vein  Introducer: 14 Fr double lumen, manometry used.  Device: CCO/Oximetric Catheter  Catheter Size: 8 Fr  Catheter placement by chung Jones positive aspiration all ports. PAC floated with balloon up not wedgedSterile sheath usedInsertion Attempts: 1  Indication: hemodynamic monitoring  Assessment  Central Line Bundle Protocol followed. Hand hygiene before procedure, surgical cap worn, surgical mask worn, sterile surgical gloves worn, large sterile drape used.  Verification: pulsatile blood flow and blood return  Patient: Tolerated Well

## 2018-11-11 NOTE — ASSESSMENT & PLAN NOTE
S/P liver transplant     54 y/o man with ESLD, ESRD and DM2 now s/p liver transplant     Neuro:   - Sedation: propofol gtt  - Pain control: no analgesia currently     Pulmonary:   - intubated  Vent Mode: SIMV  Oxygen Concentration (%): 50  TV: 50  PEEP:5  RR: 16     Cardiac:  - Drips: levo gtt  - mgmt per transplant     Renal:   -CRRT intraop  -trend BMP       Infectious Disease:   - AF  - Trend WBC  - Prophylaxis per transplant  - IS per Transplant     Hematology/Oncology:  - H&H 7.9/23.2  - Plt 33  - INR 2.8  - Trend CBC     Endocrine:  - q1h accuchecks  - Endocrine consulted, appreciate recs     Fluids/Electrolytes/Nutrition/GI:   - NPO; once extubated ADAT per Transplant  - Trend CMP  - Replace Lytes PRN  - liver US likely tomorrow  - D5 1/2NS @ 100      Prophylaxis:  - SQH  - Famotidine     Dispo:  Continue ICU care  Primary team:Transplant

## 2018-11-11 NOTE — ANESTHESIA PROCEDURE NOTES
Arterial    Diagnosis: esld    Patient location during procedure: done in OR  Procedure start time: 11/11/2018 1:59 AM  Staffing  Anesthesiologist: Richard Fontana MD  Resident/CRNA: Eder Alegria CRNA  Performed: resident/CRNA   Anesthesiologist was present at the time of the procedure.  Preanesthetic Checklist  Completed: patient identified, site marked, surgical consent, pre-op evaluation, timeout performed, IV checked, risks and benefits discussed, monitors and equipment checked and anesthesia consent givenArterial  Skin Prep: chlorhexidine gluconate  Local Infiltration: none  Orientation: left  Location: radial  Catheter Size: 20 G  Catheter placement by Anatomical landmarks. Heme positive aspiration all ports.Insertion Attempts: 1  Assessment  Dressing: secured with tape and tegaderm  Patient: Tolerated well

## 2018-11-11 NOTE — ANESTHESIA PROCEDURE NOTES
Central Line    Diagnosis: esld  Patient location during procedure: done in OR  Procedure start time: 11/11/2018 2:05 AM  Timeout: 11/11/2018 2:02 AM  Procedure end time: 11/11/2018 2:31 AM  Staffing  Anesthesiologist: Richard Fontana MD  Performed: anesthesiologist   Anesthesiologist was present at the time of the procedure.  Preanesthetic Checklist  Completed: patient identified, site marked, surgical consent, pre-op evaluation, timeout performed, IV checked, risks and benefits discussed, monitors and equipment checked and anesthesia consent given  Indication  Indication: hemodialysis, vascular access     Anesthesia   general anesthesia    Central Line  Skin Prep: skin prepped with ChloraPrep, skin prep agent completely dried prior to procedure  maximum sterile barriers used during central venous catheter insertion  hand hygiene performed prior to central venous catheter insertion  Location: right femoral,   Catheter type: double lumen  Catheter Size: 12 Fr  Ultrasound: vascular probe with ultrasound  Vessel Caliber: medium, patent, compressibility normal  Needle advanced into vessel with real time Ultrasound guidance.  Guidewire confirmed in vessel.   Manometry: Venous cannualation confirmed by visual estimation of blood vessel pressure using manometry.  Insertion Attempts: 1   Securement:line sutured, chlorhexidine patch, sterile dressing applied and blood return through all ports     Post-Procedure  Adverse Events:none

## 2018-11-11 NOTE — PROGRESS NOTES
Pt was admitted s/p liver tx intubated with a 7.5 ETT secured 21cm at the lip. Pt was placed on the mechanical vent at the documented settings. Pt tolerating well, will continue to monitor.

## 2018-11-11 NOTE — CONSULTS
[facility]  Endocrinology  Diabetes Consult Note    Consult Requested by: Femi Patel MD   Reason for admit: S/P liver transplant    HISTORY OF PRESENT ILLNESS:  Reason for Consult: Management of Hyperglycemia     Surgical Procedure and Date: liver transplant 11/11/18    HPI:   Patient is a 53 y.o. male with a diagnosis of ESLD secondary to ETOH abuse and DEL with ESRD with RRT. Patient is now s/p liver transplant. Endocrinology consulted for BG management.        Unclear if patient has DM; mentioned briefly in chart but not under history. No DM meds on file and A1C normal. Patient intubated and no family at bedside.  Lab Results   Component Value Date    HGBA1C 4.4 11/09/2018             Interval HPI:   Received in ICU. Remains intubated and sedated. BG at goal without insulin. Solumedrol 500 mg; standard steroid taper.   Eating:   NPO  Nausea: No  Hypoglycemia and intervention: No  Fever: No  TPN and/or TF: No    PMH, PSH, FH, SH  reviewed       Review of Systems   Unable to obtain due to: Sedation,Intubation,Altered mental status,Critical illness,Reviewed ROS from note dated 11/10/18 per Dr. Sharma.    Current Medications and/or Treatments Impacting Glycemic Control  Immunotherapy:    Immunosuppressants         Stop Route Frequency     tacrolimus (PROGRAF) 1 mg/mL oral syringe      -- Oral 2 times daily     mycophenolate mofetil 200 mg/mL suspension 1,000 mg      -- Oral 2 times daily        Steroids:   Hormones (From admission, onward)    Start     Stop Route Frequency Ordered    11/17/18 0900  predniSONE tablet 20 mg  (methylprednisolone taper panel)      -- Oral Daily 11/11/18 1208    11/16/18 0900  methylPREDNISolone sodium succinate injection 20 mg  (methylprednisolone taper panel)      11/17 0859 IV Every 12 hours 11/11/18 1208    11/15/18 0900  methylPREDNISolone sodium succinate injection 40 mg  (methylprednisolone taper panel)      11/16 0859 IV Every 12 hours 11/11/18 1208     11/14/18 0900  methylPREDNISolone sodium succinate injection 60 mg  (methylprednisolone taper panel)      11/15 0859 IV Every 12 hours 11/11/18 1208    11/13/18 0900  methylPREDNISolone sodium succinate injection 80 mg  (methylprednisolone taper panel)      11/14 0859 IV Every 12 hours 11/11/18 1208    11/12/18 0900  methylPREDNISolone sodium succinate injection 100 mg  (methylprednisolone taper panel)      11/13 0859 IV Every 12 hours 11/11/18 1208    11/09/18 1345  methylPREDNISolone sodium succinate injection 500 mg  (Med - Immunosuppression Induction Therapy (Methylprednisolone))      11/10 0144 IV Once pre-op 11/09/18 1236        Pressors:    Autonomic Drugs (From admission, onward)    None        Hyperglycemia/Diabetes Medications:   Antihyperglycemics (From admission, onward)    None             PHYSICAL EXAMINATION:  Vitals:    11/11/18 1215   BP:    Pulse: 79   Resp: 16   Temp:      Body mass index is 26.7 kg/m².    Physical Exam   Constitutional: Well developed, well nourished, NAD.  ENT: External ears no masses with nose patent; normal hearing.  Neck: Supple; trachea midline.  Cardiovascular: Normal heart sounds, no LE edema. DP +2 bilaterally.  Lungs: Intubated on ventilator, lungs anterior bilaterally clear to auscultation.  Abdomen: Soft, no masses, no hernias. Hypoactive bowel sounds.   MS: No clubbing or cyanosis of nails noted;  unable to assess gait.  Skin: No rashes, lesions, or ulcers; no nodules. Chevron abdominal incision with dressing; JOSE A x2. Jaundice.   Psychiatric: DORIAN  Neurological: DORIAN  Foot: no amputations noted.          Labs Reviewed and Include   Recent Labs   Lab 11/10/18  2317  11/11/18  0802      < > 175*   CALCIUM 8.9  --   --    ALBUMIN 2.2*   < > 1.7*   PROT 5.2*  --   --       < > 136   K 5.1   < > 3.0*   CO2 24  --   --      --   --    BUN 39*  --   --    CREATININE 6.4*  --   --    ALKPHOS 228*  --   --    ALT 28  --   --    AST 70*  --   --    BILITOT 37.2*   --   --     < > = values in this interval not displayed.     Lab Results   Component Value Date    WBC 14.44 (H) 11/10/2018    HGB 7.9 (L) 11/11/2018    HCT 21 (L) 11/11/2018     (H) 11/10/2018    PLT 33 (LL) 11/11/2018     No results for input(s): TSH, FREET4 in the last 168 hours.  Lab Results   Component Value Date    HGBA1C 4.4 11/09/2018       Nutritional status:   Body mass index is 26.7 kg/m².  Lab Results   Component Value Date    ALBUMIN 1.7 (L) 11/11/2018    ALBUMIN 2.1 (L) 11/11/2018    ALBUMIN 2.2 (L) 11/10/2018     Lab Results   Component Value Date    PREALBUMIN 7 (L) 10/27/2018       Estimated Creatinine Clearance: 13.8 mL/min (A) (based on SCr of 6.4 mg/dL (H)).    Accu-Checks  Recent Labs     11/08/18  1823 11/08/18  2132 11/09/18  0833 11/09/18  1657 11/09/18  1740 11/09/18  2156 11/10/18  0747 11/10/18  1634 11/10/18  2049 11/11/18  1222   POCTGLUCOSE 124* 141* 99 87 86 100 87 115* 94 207*        ASSESSMENT and PLAN    * S/P liver transplant    Managed per LTS.   avoid hypoglycemia  Lab Results   Component Value Date    ALT 28 11/10/2018    AST 70 (H) 11/10/2018    ALKPHOS 228 (H) 11/10/2018    BILITOT 37.2 (H) 11/10/2018            Acute hyperglycemia    Will clarify if patient with DM once extubated or family at bedside.   BG goal 140-180.       Continue IV insulin protocol; start if needed.   Requires q1 hr BG monitoring.        Acute liver failure without hepatic coma    S/p liver transplant.        Adrenal cortical steroids causing adverse effect in therapeutic use    May increase insulin resistance.        DEL (acute kidney injury)    Avoid insulin stacking and hypoglycemia.  Lab Results   Component Value Date    CREATININE 6.4 (H) 11/10/2018            Hepatorenal syndrome    Avoid insulin stacking and hypoglycemia.         Prophylactic immunotherapy    May increase insulin resistance.              Plan discussed with RN at bedside.     Tessa Falk NP  Endocrinology  [facility]

## 2018-11-11 NOTE — PLAN OF CARE
Problem: Patient Care Overview  Goal: Plan of Care Review  Outcome: Ongoing (interventions implemented as appropriate)  Patient remains intubated and received 1 Liter of 5% Albumin and is currently receiving 1 Unit PRBC.  Dr. Patel at bedside and aware of patient's urine output, vital signs, labs, and gtts.  Refer to flow sheet for vital signs, gtts, and assessments.  Will continue to monitor.  Questions and concerns addressed with patient's family.

## 2018-11-11 NOTE — H&P
[facility]  Critical Care - Surgery  History & Physical    Patient Name: Femi Enciso  MRN: [id number]  Admission Date: 10/27/2018  Code Status: Prior  Attending Physician: Femi Patel MD   Primary Care Provider: Primary Doctor No   Principal Problem: S/P liver transplant    Subjective:     HPI:  Femi Enciso is a 54 y/o ma n w PMH of  DM2, ESLD secondary to EtOH abuse and ESRD.  Pt is now s/p liver transplantation. Pt remains intubated, sedated. On propofol gtt and levo gtt.    Hospital/ICU Course:  No notes on file    Follow-up For: Procedure(s) (LRB):  TRANSPLANT, LIVER (N/A)    Post-Operative Day: Day of Surgery     Past Medical History:   Diagnosis Date    Liver cirrhosis, alcoholic 09/30/2018       Past Surgical History:   Procedure Laterality Date    EGD (ESOPHAGOGASTRODUODENOSCOPY) N/A 11/6/2018    Performed by Duarte Jules MD at [facility] ([address] [place] [address])    ESOPHAGOGASTRODUODENOSCOPY N/A 11/6/2018    Procedure: EGD (ESOPHAGOGASTRODUODENOSCOPY);  Surgeon: Duarte Jules MD;  [facility]  Service: Endoscopy;  Laterality: N/A;    INSERTION OF TUNNELED CENTRAL VENOUS HEMODIALYSIS CATHETER N/A 11/7/2018    Procedure: INSERTION, CATHETER, CENTRAL VENOUS, HEMODIALYSIS, TUNNELED;  Surgeon: Brock Gonzalez MD;  Location: [facility] CATH LAB;  Service: Nephrology;  Laterality: N/A;    INSERTION, CATHETER, CENTRAL VENOUS, HEMODIALYSIS, TUNNELED N/A 11/7/2018    Performed by Brock Gonzalez MD at [facility] CATH LAB       Review of patient's allergies indicates:   Allergen Reactions    Cefepime Rash    Penicillins Nausea And Vomiting and Rash     Full body rash from cefepime as well       Family History     None        Tobacco Use    Smoking status: Never Smoker   Substance and Sexual Activity    Alcohol use: Not on file    Drug use: Not on file    Sexual activity: Not on file      Review of Systems   Unable to perform ROS: Acuity of condition     Objective:     Vital Signs (Most  Recent):  Temp: 98.1 °F (36.7 °C) (11/11/18 1600)  Pulse: 86 (11/11/18 1630)  Resp: 11 (11/11/18 1630)  BP: (!) 61/43 (11/11/18 1608)  SpO2: 100 % (11/11/18 1630) Vital Signs (24h Range):  Temp:  [97.9 °F (36.6 °C)-98.6 °F (37 °C)] 98.1 °F (36.7 °C)  Pulse:  [75-89] 86  Resp:  [3-22] 11  SpO2:  [95 %-100 %] 100 %  BP: ()/(43-64) 61/43  Arterial Line BP: ()/(38-58) 102/45     Weight: 84.4 kg (186 lb 1.1 oz)  Body mass index is 26.7 kg/m².      Intake/Output Summary (Last 24 hours) at 11/11/2018 1642  Last data filed at 11/11/2018 1600  Gross per 24 hour   Intake 32371 ml   Output 97141 ml   Net 4631 ml       Physical Exam   Eyes:   +jaundice   Cardiovascular: Normal rate, regular rhythm and intact distal pulses.   Pulmonary/Chest:   Coarse BS b/l   Abdominal:   3 JOSE A drain with SS output  Chevron incision c/d/i   Musculoskeletal: He exhibits edema.   Neurological:   Intubated, sedated   Skin:   Diffuse jaundice       Vents:  Vent Mode: SIMV (11/11/18 1608)  Ventilator Initiated: Yes (11/11/18 1215)  Set Rate: 16 bmp (11/11/18 1608)  Vt Set: 500 mL (11/11/18 1608)  Pressure Support: 10 cmH20 (11/11/18 1608)  PEEP/CPAP: 5 cmH20 (11/11/18 1608)  Oxygen Concentration (%): 50 (11/11/18 1630)  Peak Airway Pressure: 22 cmH2O (11/11/18 1608)  Plateau Pressure: 0 cmH20 (11/11/18 1608)  Total Ve: 8.32 mL (11/11/18 1608)  F/VT Ratio<105 (RSBI): (!) 30.71 (11/11/18 1608)    Lines/Drains/Airways     Central Venous Catheter Line                 Tunneled Central Line Insertion/Assessment - Double Lumen  11/07/18 1350 right internal jugular 4 days         Percutaneous Central Line Insertion/Assessment - double lumen  11/11/18 0205 right femoral less than 1 day         Pulmonary Artery Catheter Assessment  11/11/18 0236 less than 1 day          Drain                 Closed/Suction Drain 11/11/18 1124 Right Abdomen Bulb 19 Fr. less than 1 day         Closed/Suction Drain 11/11/18 1127 Right Abdomen Bulb 19 Fr. less than 1  day         Closed/Suction Drain 11/11/18 1128 Left Abdomen Bulb 19 Fr. less than 1 day         Urethral Catheter 11/11/18 0246 Straight-tip;Non-latex 16 Fr. less than 1 day          Airway                 Airway - Non-Surgical 11/11/18 0158 Endotracheal Tube less than 1 day          Arterial Line                 Arterial Line 11/11/18 0159 Left Radial less than 1 day         Arterial Line 11/11/18 0205 Right Femoral less than 1 day          Peripheral Intravenous Line                 Peripheral IV - Single Lumen 11/09/18 1700 Right Antecubital 1 day         Peripheral IV - Single Lumen 11/11/18 0212 Left Antecubital less than 1 day                Significant Labs:    CBC/Anemia Profile:  Recent Labs   Lab 11/10/18  0444 11/10/18  2317 11/11/18  0205 11/11/18  0802 11/11/18  1222   WBC 14.68* 14.44*  --   --   --    HGB 10.4* 10.1* 9.6* 7.9*  --    HCT 30.7* 29.7* 27.2* 23.2* 21*   PLT 78* 82* 77* 33*  --    * 100*  --   --   --    RDW 18.4* 18.2*  --   --   --         Chemistries:  Recent Labs   Lab 11/10/18  0444 11/10/18  2317 11/11/18  0205 11/11/18  0802    136 133* 136   K 5.0 5.1 4.5 3.0*    101  --   --    CO2 23 24  --   --    BUN 28* 39*  --   --    CREATININE 5.1* 6.4*  --   --    CALCIUM 8.5* 8.9  --   --    ALBUMIN 2.3* 2.2* 2.1* 1.7*   PROT 5.1* 5.2*  --   --    BILITOT 37.8* 37.2*  --   --    ALKPHOS 238* 228*  --   --    ALT 28 28  --   --    AST 80* 70*  --   --    MG 1.8 1.5* 1.8 1.6   PHOS 4.0  --   --   --        All pertinent labs within the past 24 hours have been reviewed.    Significant Imaging: I have reviewed all pertinent imaging results/findings within the past 24 hours.    Assessment/Plan:     * S/P liver transplant    S/P liver transplant     54 y/o man with ESLD, ESRD and DM2 now s/p liver transplant     Neuro:   - Sedation: propofol gtt  - Pain control: no analgesia currently     Pulmonary:   - intubated  Vent Mode: SIMV  Oxygen Concentration (%): 50  TV:  50  PEEP:5  RR: 16     Cardiac:  - Drips: levo gtt  - mgmt per transplant     Renal:   -CRRT intraop  -trend BMP       Infectious Disease:   - AF  - Trend WBC  - Prophylaxis per transplant  - IS per Transplant     Hematology/Oncology:  - H&H  7.9/23.2  - Plt 33  - INR 2.8  - Trend CBC     Endocrine:  - q1h accuchecks  - Endocrine consulted, appreciate recs     Fluids/Electrolytes/Nutrition/GI:   - NPO; once extubated ADAT per Transplant  - Trend CMP  - Replace Lytes PRN  - liver US likely tomorrow  - D5 1/2NS @ 100      Prophylaxis:  - SQH  - Famotidine     Dispo:  Continue ICU care  Primary team:Transplant              Critical care was time spent personally by me on the following activities: development of treatment plan with patient or surrogate and bedside caregivers, discussions with consultants, evaluation of patient's response to treatment, examination of patient, ordering and performing treatments and interventions, ordering and review of laboratory studies, ordering and review of radiographic studies, pulse oximetry, re-evaluation of patient's condition.  This critical care time did not overlap with that of any other provider or involve time for any procedures.     Adolph Pavon MD  Critical Care - Surgery  [facility]

## 2018-11-11 NOTE — HPI
Femi Enciso is a 54 y/o man w PMH of  DM2, ESLD secondary to EtOH abuse and ESRD.  Pt is now s/p liver transplantation. Pt remains intubated, sedated. On propofol gtt and levo gtt.

## 2018-11-11 NOTE — HPI
Reason for Consult: Management of Hyperglycemia and type 2 DM    Surgical Procedure and Date: liver transplant 11/11/18; exploratory lap and liver biopsy on 11/16/18      Diabetes diagnosis year: ~ 3-4 years ago     Home Diabetes Medications:  none; previously on metformin 1000 mg BID    How often checking glucose at home? Not checking  BG readings on regimen: n/a  Hypoglycemia on the regimen?  n/a  Missed doses on regimen? n/a    Diabetes Complications include:     DORIAN    Complicating diabetes co morbidities:   CIRRHOSIS and Glucocorticoid use       HPI:   Patient is a 53 y.o. male with a diagnosis of ESLD secondary to ETOH abuse and DEL with ESRD with RRT. Patient is now s/p liver transplant. Endocrinology consulted for BG management.       Lab Results   Component Value Date    HGBA1C 4.4 11/09/2018

## 2018-11-11 NOTE — NURSING
Intra-operative HD completed. Total hours on machine 7.28. Tolerated well. Flushed back and lines capped securely.

## 2018-11-11 NOTE — ASSESSMENT & PLAN NOTE
Managed per LTS.   avoid hypoglycemia  Lab Results   Component Value Date    ALT 28 11/10/2018    AST 70 (H) 11/10/2018    ALKPHOS 228 (H) 11/10/2018    BILITOT 37.2 (H) 11/10/2018

## 2018-11-12 PROBLEM — K72.00 ACUTE LIVER FAILURE WITHOUT HEPATIC COMA: Status: RESOLVED | Noted: 2018-10-27 | Resolved: 2018-11-12

## 2018-11-12 PROBLEM — K72.10 END STAGE LIVER DISEASE: Status: RESOLVED | Noted: 2018-11-09 | Resolved: 2018-11-12

## 2018-11-12 PROBLEM — K70.11 ALCOHOLIC HEPATITIS WITH ASCITES: Status: RESOLVED | Noted: 2018-10-27 | Resolved: 2018-11-12

## 2018-11-12 PROBLEM — K76.82 HEPATIC ENCEPHALOPATHY: Status: RESOLVED | Noted: 2018-10-27 | Resolved: 2018-11-12

## 2018-11-12 LAB
25(OH)D3+25(OH)D2 SERPL-MCNC: 12 NG/ML
ALBUMIN SERPL BCP-MCNC: 2.4 G/DL
ALBUMIN SERPL BCP-MCNC: 2.7 G/DL
ALLENS TEST: ABNORMAL
ALP SERPL-CCNC: 65 U/L
ALT SERPL W/O P-5'-P-CCNC: 310 U/L
ANION GAP SERPL CALC-SCNC: 10 MMOL/L
ANION GAP SERPL CALC-SCNC: 13 MMOL/L
ANION GAP SERPL CALC-SCNC: 8 MMOL/L
ANISOCYTOSIS BLD QL SMEAR: SLIGHT
ANISOCYTOSIS BLD QL SMEAR: SLIGHT
APTT BLDCRRT: 38 SEC
AST SERPL-CCNC: 194 U/L
AST SERPL-CCNC: 211 U/L
AST SERPL-CCNC: 249 U/L
AST SERPL-CCNC: 249 U/L
AST SERPL-CCNC: 351 U/L
BACTERIA UR CULT: NORMAL
BASOPHILS # BLD AUTO: 0.02 K/UL
BASOPHILS # BLD AUTO: 0.02 K/UL
BASOPHILS # BLD AUTO: 0.05 K/UL
BASOPHILS # BLD AUTO: 0.06 K/UL
BASOPHILS # BLD AUTO: 0.06 K/UL
BASOPHILS # BLD AUTO: 0.08 K/UL
BASOPHILS # BLD AUTO: 0.08 K/UL
BASOPHILS NFR BLD: 0.1 %
BASOPHILS NFR BLD: 0.2 %
BASOPHILS NFR BLD: 0.4 %
BASOPHILS NFR BLD: 0.6 %
BASOPHILS NFR BLD: 0.6 %
BILIRUB DIRECT SERPL-MCNC: 6.3 MG/DL
BILIRUB SERPL-MCNC: 8.6 MG/DL
BLD PROD TYP BPU: NORMAL
BLOOD UNIT EXPIRATION DATE: NORMAL
BLOOD UNIT TYPE CODE: 5100
BLOOD UNIT TYPE: NORMAL
BUN SERPL-MCNC: 15 MG/DL
BUN SERPL-MCNC: 29 MG/DL
BUN SERPL-MCNC: 31 MG/DL
BURR CELLS BLD QL SMEAR: ABNORMAL
BURR CELLS BLD QL SMEAR: ABNORMAL
CALCIUM SERPL-MCNC: 7.7 MG/DL
CALCIUM SERPL-MCNC: 8.1 MG/DL
CALCIUM SERPL-MCNC: 8.4 MG/DL
CHLORIDE SERPL-SCNC: 103 MMOL/L
CHLORIDE SERPL-SCNC: 104 MMOL/L
CHLORIDE SERPL-SCNC: 105 MMOL/L
CO2 SERPL-SCNC: 21 MMOL/L
CO2 SERPL-SCNC: 22 MMOL/L
CO2 SERPL-SCNC: 25 MMOL/L
CODING SYSTEM: NORMAL
CREAT SERPL-MCNC: 1.8 MG/DL
CREAT SERPL-MCNC: 3.4 MG/DL
CREAT SERPL-MCNC: 3.6 MG/DL
DELSYS: ABNORMAL
DIFFERENTIAL METHOD: ABNORMAL
DISPENSE STATUS: NORMAL
EOSINOPHIL # BLD AUTO: 0.1 K/UL
EOSINOPHIL # BLD AUTO: 0.2 K/UL
EOSINOPHIL # BLD AUTO: 0.3 K/UL
EOSINOPHIL NFR BLD: 0.5 %
EOSINOPHIL NFR BLD: 0.8 %
EOSINOPHIL NFR BLD: 1.3 %
EOSINOPHIL NFR BLD: 1.5 %
EOSINOPHIL NFR BLD: 1.6 %
ERYTHROCYTE [DISTWIDTH] IN BLOOD BY AUTOMATED COUNT: 14.3 %
ERYTHROCYTE [DISTWIDTH] IN BLOOD BY AUTOMATED COUNT: 14.6 %
ERYTHROCYTE [DISTWIDTH] IN BLOOD BY AUTOMATED COUNT: 15 %
ERYTHROCYTE [DISTWIDTH] IN BLOOD BY AUTOMATED COUNT: 15.1 %
ERYTHROCYTE [DISTWIDTH] IN BLOOD BY AUTOMATED COUNT: 15.4 %
ERYTHROCYTE [SEDIMENTATION RATE] IN BLOOD BY WESTERGREN METHOD: 16 MM/H
EST. GFR  (AFRICAN AMERICAN): 21 ML/MIN/1.73 M^2
EST. GFR  (AFRICAN AMERICAN): 22.5 ML/MIN/1.73 M^2
EST. GFR  (AFRICAN AMERICAN): 48.6 ML/MIN/1.73 M^2
EST. GFR  (NON AFRICAN AMERICAN): 18.2 ML/MIN/1.73 M^2
EST. GFR  (NON AFRICAN AMERICAN): 19.5 ML/MIN/1.73 M^2
EST. GFR  (NON AFRICAN AMERICAN): 42 ML/MIN/1.73 M^2
FIO2: 40
FIO2: 50
FLOW: 60
GGT SERPL-CCNC: 66 U/L
GLUCOSE SERPL-MCNC: 101 MG/DL (ref 70–110)
GLUCOSE SERPL-MCNC: 106 MG/DL (ref 70–110)
GLUCOSE SERPL-MCNC: 117 MG/DL (ref 70–110)
GLUCOSE SERPL-MCNC: 126 MG/DL
GLUCOSE SERPL-MCNC: 134 MG/DL (ref 70–110)
GLUCOSE SERPL-MCNC: 152 MG/DL
GLUCOSE SERPL-MCNC: 175 MG/DL (ref 70–110)
GLUCOSE SERPL-MCNC: 176 MG/DL
GLUCOSE SERPL-MCNC: 181 MG/DL (ref 70–110)
GLUCOSE SERPL-MCNC: 184 MG/DL (ref 70–110)
GLUCOSE SERPL-MCNC: 94 MG/DL (ref 70–110)
HCO3 UR-SCNC: 20.2 MMOL/L (ref 24–28)
HCO3 UR-SCNC: 20.8 MMOL/L (ref 24–28)
HCO3 UR-SCNC: 23.2 MMOL/L (ref 24–28)
HCO3 UR-SCNC: 23.8 MMOL/L (ref 24–28)
HCO3 UR-SCNC: 23.9 MMOL/L (ref 24–28)
HCO3 UR-SCNC: 24 MMOL/L (ref 24–28)
HCO3 UR-SCNC: 24.8 MMOL/L (ref 24–28)
HCO3 UR-SCNC: 24.9 MMOL/L (ref 24–28)
HCO3 UR-SCNC: 24.9 MMOL/L (ref 24–28)
HCO3 UR-SCNC: 25 MMOL/L (ref 24–28)
HCO3 UR-SCNC: 25.8 MMOL/L (ref 24–28)
HCO3 UR-SCNC: 26.4 MMOL/L (ref 24–28)
HCO3 UR-SCNC: 26.9 MMOL/L (ref 24–28)
HCO3 UR-SCNC: 27.3 MMOL/L (ref 24–28)
HCT VFR BLD AUTO: 19.9 %
HCT VFR BLD AUTO: 20.8 %
HCT VFR BLD AUTO: 23.1 %
HCT VFR BLD AUTO: 24.2 %
HCT VFR BLD AUTO: 24.3 %
HCT VFR BLD AUTO: 24.5 %
HCT VFR BLD AUTO: 24.5 %
HCT VFR BLD CALC: 18 %PCV (ref 36–54)
HCT VFR BLD CALC: 19 %PCV (ref 36–54)
HCT VFR BLD CALC: 21 %PCV (ref 36–54)
HCT VFR BLD CALC: 22 %PCV (ref 36–54)
HCT VFR BLD CALC: 23 %PCV (ref 36–54)
HCT VFR BLD CALC: 23 %PCV (ref 36–54)
HCT VFR BLD CALC: 24 %PCV (ref 36–54)
HCT VFR BLD CALC: 25 %PCV (ref 36–54)
HCT VFR BLD CALC: 26 %PCV (ref 36–54)
HCT VFR BLD CALC: 29 %PCV (ref 36–54)
HGB BLD-MCNC: 7.1 G/DL
HGB BLD-MCNC: 7.4 G/DL
HGB BLD-MCNC: 8.2 G/DL
HGB BLD-MCNC: 8.5 G/DL
HGB BLD-MCNC: 8.5 G/DL
HGB BLD-MCNC: 8.6 G/DL
HGB BLD-MCNC: 8.7 G/DL
HYPOCHROMIA BLD QL SMEAR: ABNORMAL
HYPOCHROMIA BLD QL SMEAR: ABNORMAL
IMM GRANULOCYTES # BLD AUTO: 0.07 K/UL
IMM GRANULOCYTES # BLD AUTO: 0.12 K/UL
IMM GRANULOCYTES # BLD AUTO: 0.14 K/UL
IMM GRANULOCYTES # BLD AUTO: 0.14 K/UL
IMM GRANULOCYTES # BLD AUTO: 0.19 K/UL
IMM GRANULOCYTES # BLD AUTO: 0.21 K/UL
IMM GRANULOCYTES # BLD AUTO: 0.21 K/UL
IMM GRANULOCYTES NFR BLD AUTO: 0.7 %
IMM GRANULOCYTES NFR BLD AUTO: 0.9 %
IMM GRANULOCYTES NFR BLD AUTO: 1.1 %
IMM GRANULOCYTES NFR BLD AUTO: 1.1 %
IMM GRANULOCYTES NFR BLD AUTO: 1.2 %
IMM GRANULOCYTES NFR BLD AUTO: 1.3 %
IMM GRANULOCYTES NFR BLD AUTO: 1.5 %
INR PPP: 1.6
INR PPP: 1.7
INR PPP: 1.7
INR PPP: 1.8
INR PPP: 1.8
LYMPHOCYTES # BLD AUTO: 0.9 K/UL
LYMPHOCYTES # BLD AUTO: 0.9 K/UL
LYMPHOCYTES # BLD AUTO: 1.2 K/UL
LYMPHOCYTES # BLD AUTO: 1.3 K/UL
LYMPHOCYTES # BLD AUTO: 1.3 K/UL
LYMPHOCYTES # BLD AUTO: 1.6 K/UL
LYMPHOCYTES NFR BLD: 10.1 %
LYMPHOCYTES NFR BLD: 6.2 %
LYMPHOCYTES NFR BLD: 7.6 %
LYMPHOCYTES NFR BLD: 9.1 %
LYMPHOCYTES NFR BLD: 9.4 %
LYMPHOCYTES NFR BLD: 9.9 %
LYMPHOCYTES NFR BLD: 9.9 %
MAGNESIUM SERPL-MCNC: 1.9 MG/DL
MAGNESIUM SERPL-MCNC: 2.3 MG/DL
MCH RBC QN AUTO: 31.7 PG
MCH RBC QN AUTO: 31.8 PG
MCH RBC QN AUTO: 31.8 PG
MCH RBC QN AUTO: 32.6 PG
MCH RBC QN AUTO: 32.8 PG
MCH RBC QN AUTO: 32.8 PG
MCH RBC QN AUTO: 32.9 PG
MCHC RBC AUTO-ENTMCNC: 34.7 G/DL
MCHC RBC AUTO-ENTMCNC: 34.7 G/DL
MCHC RBC AUTO-ENTMCNC: 35.5 G/DL
MCHC RBC AUTO-ENTMCNC: 35.5 G/DL
MCHC RBC AUTO-ENTMCNC: 35.6 G/DL
MCHC RBC AUTO-ENTMCNC: 35.7 G/DL
MCHC RBC AUTO-ENTMCNC: 35.8 G/DL
MCV RBC AUTO: 89 FL
MCV RBC AUTO: 89 FL
MCV RBC AUTO: 90 FL
MCV RBC AUTO: 92 FL
MCV RBC AUTO: 95 FL
MCV RBC AUTO: 95 FL
MODE: ABNORMAL
MONOCYTES # BLD AUTO: 0.6 K/UL
MONOCYTES # BLD AUTO: 0.8 K/UL
MONOCYTES # BLD AUTO: 0.8 K/UL
MONOCYTES # BLD AUTO: 1 K/UL
MONOCYTES # BLD AUTO: 1.6 K/UL
MONOCYTES NFR BLD: 5.8 %
MONOCYTES NFR BLD: 6.8 %
MONOCYTES NFR BLD: 7.1 %
MONOCYTES NFR BLD: 7.5 %
MONOCYTES NFR BLD: 9.9 %
NEUTROPHILS # BLD AUTO: 10.5 K/UL
NEUTROPHILS # BLD AUTO: 10.9 K/UL
NEUTROPHILS # BLD AUTO: 10.9 K/UL
NEUTROPHILS # BLD AUTO: 12 K/UL
NEUTROPHILS # BLD AUTO: 12.3 K/UL
NEUTROPHILS # BLD AUTO: 12.7 K/UL
NEUTROPHILS # BLD AUTO: 8.6 K/UL
NEUTROPHILS NFR BLD: 76.7 %
NEUTROPHILS NFR BLD: 81.3 %
NEUTROPHILS NFR BLD: 82.1 %
NEUTROPHILS NFR BLD: 82.1 %
NEUTROPHILS NFR BLD: 82.9 %
NEUTROPHILS NFR BLD: 83.3 %
NEUTROPHILS NFR BLD: 83.5 %
NRBC BLD-RTO: 0 /100 WBC
OVALOCYTES BLD QL SMEAR: ABNORMAL
OVALOCYTES BLD QL SMEAR: ABNORMAL
PCO2 BLDA: 33 MMHG (ref 35–45)
PCO2 BLDA: 34 MMHG (ref 35–45)
PCO2 BLDA: 34.2 MMHG (ref 35–45)
PCO2 BLDA: 35.6 MMHG (ref 35–45)
PCO2 BLDA: 35.7 MMHG (ref 35–45)
PCO2 BLDA: 35.9 MMHG (ref 35–45)
PCO2 BLDA: 36.9 MMHG (ref 35–45)
PCO2 BLDA: 37.5 MMHG (ref 35–45)
PCO2 BLDA: 37.5 MMHG (ref 35–45)
PCO2 BLDA: 38.7 MMHG (ref 35–45)
PCO2 BLDA: 40.2 MMHG (ref 35–45)
PCO2 BLDA: 41.1 MMHG (ref 35–45)
PCO2 BLDA: 41.7 MMHG (ref 35–45)
PCO2 BLDA: 43.7 MMHG (ref 35–45)
PEEP: 5
PH SMN: 7.34 [PH] (ref 7.35–7.45)
PH SMN: 7.36 [PH] (ref 7.35–7.45)
PH SMN: 7.37 [PH] (ref 7.35–7.45)
PH SMN: 7.41 [PH] (ref 7.35–7.45)
PH SMN: 7.42 [PH] (ref 7.35–7.45)
PH SMN: 7.43 [PH] (ref 7.35–7.45)
PH SMN: 7.43 [PH] (ref 7.35–7.45)
PH SMN: 7.44 [PH] (ref 7.35–7.45)
PH SMN: 7.46 [PH] (ref 7.35–7.45)
PH SMN: 7.46 [PH] (ref 7.35–7.45)
PH SMN: 7.47 [PH] (ref 7.35–7.45)
PH SMN: 7.47 [PH] (ref 7.35–7.45)
PHOSPHATE SERPL-MCNC: 3.3 MG/DL
PHOSPHATE SERPL-MCNC: 4 MG/DL
PIP: 22
PLATELET # BLD AUTO: 106 K/UL
PLATELET # BLD AUTO: 106 K/UL
PLATELET # BLD AUTO: 50 K/UL
PLATELET # BLD AUTO: 63 K/UL
PLATELET # BLD AUTO: 67 K/UL
PLATELET # BLD AUTO: 84 K/UL
PLATELET # BLD AUTO: 88 K/UL
PLATELET BLD QL SMEAR: ABNORMAL
PMV BLD AUTO: 10.8 FL
PMV BLD AUTO: 11 FL
PMV BLD AUTO: 11.2 FL
PMV BLD AUTO: 11.3 FL
PMV BLD AUTO: 11.4 FL
PO2 BLDA: 117 MMHG (ref 80–100)
PO2 BLDA: 136 MMHG (ref 80–100)
PO2 BLDA: 148 MMHG (ref 80–100)
PO2 BLDA: 156 MMHG (ref 80–100)
PO2 BLDA: 167 MMHG (ref 80–100)
PO2 BLDA: 277 MMHG (ref 80–100)
PO2 BLDA: 283 MMHG (ref 80–100)
PO2 BLDA: 319 MMHG (ref 80–100)
PO2 BLDA: 344 MMHG (ref 80–100)
PO2 BLDA: 345 MMHG (ref 80–100)
PO2 BLDA: 348 MMHG (ref 80–100)
PO2 BLDA: 369 MMHG (ref 80–100)
PO2 BLDA: 38 MMHG (ref 40–60)
PO2 BLDA: 394 MMHG (ref 80–100)
POC BE: -1 MMOL/L
POC BE: -1 MMOL/L
POC BE: -4 MMOL/L
POC BE: -6 MMOL/L
POC BE: 0 MMOL/L
POC BE: 1 MMOL/L
POC BE: 3 MMOL/L
POC IONIZED CALCIUM: 0.83 MMOL/L (ref 1.06–1.42)
POC IONIZED CALCIUM: 0.94 MMOL/L (ref 1.06–1.42)
POC IONIZED CALCIUM: 0.99 MMOL/L (ref 1.06–1.42)
POC IONIZED CALCIUM: 1.01 MMOL/L (ref 1.06–1.42)
POC IONIZED CALCIUM: 1.07 MMOL/L (ref 1.06–1.42)
POC IONIZED CALCIUM: 1.1 MMOL/L (ref 1.06–1.42)
POC IONIZED CALCIUM: 1.19 MMOL/L (ref 1.06–1.42)
POC IONIZED CALCIUM: 1.23 MMOL/L (ref 1.06–1.42)
POC IONIZED CALCIUM: 1.28 MMOL/L (ref 1.06–1.42)
POC IONIZED CALCIUM: 1.44 MMOL/L (ref 1.06–1.42)
POC SATURATED O2: 100 % (ref 95–100)
POC SATURATED O2: 74 % (ref 95–100)
POC SATURATED O2: 99 % (ref 95–100)
POC TCO2: 21 MMOL/L (ref 23–27)
POC TCO2: 22 MMOL/L (ref 23–27)
POC TCO2: 24 MMOL/L (ref 23–27)
POC TCO2: 25 MMOL/L (ref 23–27)
POC TCO2: 25 MMOL/L (ref 23–27)
POC TCO2: 25 MMOL/L (ref 24–29)
POC TCO2: 26 MMOL/L (ref 23–27)
POC TCO2: 27 MMOL/L (ref 23–27)
POC TCO2: 28 MMOL/L (ref 23–27)
POC TCO2: 28 MMOL/L (ref 23–27)
POC TCO2: 29 MMOL/L (ref 23–27)
POCT GLUCOSE: 114 MG/DL (ref 70–110)
POCT GLUCOSE: 129 MG/DL (ref 70–110)
POCT GLUCOSE: 131 MG/DL (ref 70–110)
POCT GLUCOSE: 138 MG/DL (ref 70–110)
POCT GLUCOSE: 140 MG/DL (ref 70–110)
POCT GLUCOSE: 147 MG/DL (ref 70–110)
POCT GLUCOSE: 150 MG/DL (ref 70–110)
POCT GLUCOSE: 152 MG/DL (ref 70–110)
POCT GLUCOSE: 162 MG/DL (ref 70–110)
POCT GLUCOSE: 164 MG/DL (ref 70–110)
POCT GLUCOSE: 172 MG/DL (ref 70–110)
POCT GLUCOSE: 176 MG/DL (ref 70–110)
POCT GLUCOSE: 178 MG/DL (ref 70–110)
POCT GLUCOSE: 179 MG/DL (ref 70–110)
POCT GLUCOSE: 192 MG/DL (ref 70–110)
POCT GLUCOSE: 193 MG/DL (ref 70–110)
POCT GLUCOSE: 199 MG/DL (ref 70–110)
POIKILOCYTOSIS BLD QL SMEAR: SLIGHT
POIKILOCYTOSIS BLD QL SMEAR: SLIGHT
POLYCHROMASIA BLD QL SMEAR: ABNORMAL
POLYCHROMASIA BLD QL SMEAR: ABNORMAL
POTASSIUM BLD-SCNC: 2.8 MMOL/L (ref 3.5–5.1)
POTASSIUM BLD-SCNC: 2.9 MMOL/L (ref 3.5–5.1)
POTASSIUM BLD-SCNC: 3.1 MMOL/L (ref 3.5–5.1)
POTASSIUM BLD-SCNC: 3.3 MMOL/L (ref 3.5–5.1)
POTASSIUM BLD-SCNC: 3.3 MMOL/L (ref 3.5–5.1)
POTASSIUM BLD-SCNC: 3.5 MMOL/L (ref 3.5–5.1)
POTASSIUM BLD-SCNC: 3.7 MMOL/L (ref 3.5–5.1)
POTASSIUM BLD-SCNC: 3.7 MMOL/L (ref 3.5–5.1)
POTASSIUM BLD-SCNC: 4.1 MMOL/L (ref 3.5–5.1)
POTASSIUM BLD-SCNC: 4.4 MMOL/L (ref 3.5–5.1)
POTASSIUM SERPL-SCNC: 3.4 MMOL/L
POTASSIUM SERPL-SCNC: 4.1 MMOL/L
POTASSIUM SERPL-SCNC: 4.4 MMOL/L
PROT SERPL-MCNC: 3.7 G/DL
PROTHROMBIN TIME: 16.1 SEC
PROTHROMBIN TIME: 16.9 SEC
PROTHROMBIN TIME: 16.9 SEC
PROTHROMBIN TIME: 17.5 SEC
PROTHROMBIN TIME: 18.1 SEC
PS: 10
PS: 5
RBC # BLD AUTO: 2.16 M/UL
RBC # BLD AUTO: 2.27 M/UL
RBC # BLD AUTO: 2.58 M/UL
RBC # BLD AUTO: 2.59 M/UL
RBC # BLD AUTO: 2.59 M/UL
RBC # BLD AUTO: 2.71 M/UL
RBC # BLD AUTO: 2.74 M/UL
SAMPLE: ABNORMAL
SITE: ABNORMAL
SODIUM BLD-SCNC: 135 MMOL/L (ref 136–145)
SODIUM BLD-SCNC: 136 MMOL/L (ref 136–145)
SODIUM BLD-SCNC: 136 MMOL/L (ref 136–145)
SODIUM BLD-SCNC: 138 MMOL/L (ref 136–145)
SODIUM BLD-SCNC: 138 MMOL/L (ref 136–145)
SODIUM BLD-SCNC: 139 MMOL/L (ref 136–145)
SODIUM BLD-SCNC: 140 MMOL/L (ref 136–145)
SODIUM BLD-SCNC: 140 MMOL/L (ref 136–145)
SODIUM SERPL-SCNC: 136 MMOL/L
SODIUM SERPL-SCNC: 137 MMOL/L
SODIUM SERPL-SCNC: 138 MMOL/L
SP02: 100
SPONT RATE: 24
TACROLIMUS BLD-MCNC: 2.2 NG/ML
TRANS ERYTHROCYTES VOL PATIENT: NORMAL ML
VT: 500
WBC # BLD AUTO: 10.37 K/UL
WBC # BLD AUTO: 12.9 K/UL
WBC # BLD AUTO: 13.22 K/UL
WBC # BLD AUTO: 13.22 K/UL
WBC # BLD AUTO: 14.42 K/UL
WBC # BLD AUTO: 15.22 K/UL
WBC # BLD AUTO: 15.97 K/UL

## 2018-11-12 PROCEDURE — 27000221 HC OXYGEN, UP TO 24 HOURS

## 2018-11-12 PROCEDURE — 82248 BILIRUBIN DIRECT: CPT

## 2018-11-12 PROCEDURE — 81300031 HC LIVER TRANSPORT, FLIGHT WITHIN 301-700 MILES

## 2018-11-12 PROCEDURE — P9021 RED BLOOD CELLS UNIT: HCPCS

## 2018-11-12 PROCEDURE — 85610 PROTHROMBIN TIME: CPT | Mod: 91

## 2018-11-12 PROCEDURE — 80048 BASIC METABOLIC PNL TOTAL CA: CPT

## 2018-11-12 PROCEDURE — 94010 BREATHING CAPACITY TEST: CPT

## 2018-11-12 PROCEDURE — 99900017 HC EXTUBATION W/PARAMETERS (STAT)

## 2018-11-12 PROCEDURE — 90945 DIALYSIS ONE EVALUATION: CPT | Mod: ,,, | Performed by: INTERNAL MEDICINE

## 2018-11-12 PROCEDURE — 83735 ASSAY OF MAGNESIUM: CPT | Mod: 91

## 2018-11-12 PROCEDURE — 94761 N-INVAS EAR/PLS OXIMETRY MLT: CPT

## 2018-11-12 PROCEDURE — 82977 ASSAY OF GGT: CPT

## 2018-11-12 PROCEDURE — 63600175 PHARM REV CODE 636 W HCPCS: Mod: JG | Performed by: STUDENT IN AN ORGANIZED HEALTH CARE EDUCATION/TRAINING PROGRAM

## 2018-11-12 PROCEDURE — 82803 BLOOD GASES ANY COMBINATION: CPT

## 2018-11-12 PROCEDURE — 90945 DIALYSIS ONE EVALUATION: CPT

## 2018-11-12 PROCEDURE — 94150 VITAL CAPACITY TEST: CPT

## 2018-11-12 PROCEDURE — 63600175 PHARM REV CODE 636 W HCPCS: Performed by: NURSE PRACTITIONER

## 2018-11-12 PROCEDURE — 25000003 PHARM REV CODE 250: Performed by: NURSE PRACTITIONER

## 2018-11-12 PROCEDURE — 80197 ASSAY OF TACROLIMUS: CPT

## 2018-11-12 PROCEDURE — 85610 PROTHROMBIN TIME: CPT

## 2018-11-12 PROCEDURE — 80053 COMPREHEN METABOLIC PANEL: CPT

## 2018-11-12 PROCEDURE — 63600175 PHARM REV CODE 636 W HCPCS: Performed by: TRANSPLANT SURGERY

## 2018-11-12 PROCEDURE — 99232 SBSQ HOSP IP/OBS MODERATE 35: CPT | Mod: ,,, | Performed by: NURSE PRACTITIONER

## 2018-11-12 PROCEDURE — 99232 PR SUBSEQUENT HOSPITAL CARE,LEVL II: ICD-10-PCS | Mod: 24,,, | Performed by: SURGERY

## 2018-11-12 PROCEDURE — 90945 PR DIALYSIS, NOT HEMO, 1 EVAL: ICD-10-PCS | Mod: ,,, | Performed by: INTERNAL MEDICINE

## 2018-11-12 PROCEDURE — 25000003 PHARM REV CODE 250: Performed by: HOSPITALIST

## 2018-11-12 PROCEDURE — 37799 UNLISTED PX VASCULAR SURGERY: CPT

## 2018-11-12 PROCEDURE — 25000003 PHARM REV CODE 250: Performed by: STUDENT IN AN ORGANIZED HEALTH CARE EDUCATION/TRAINING PROGRAM

## 2018-11-12 PROCEDURE — 81300000 HC LIVER ACQUISITION CHARGE

## 2018-11-12 PROCEDURE — S0028 INJECTION, FAMOTIDINE, 20 MG: HCPCS | Performed by: TRANSPLANT SURGERY

## 2018-11-12 PROCEDURE — 85730 THROMBOPLASTIN TIME PARTIAL: CPT

## 2018-11-12 PROCEDURE — 99232 PR SUBSEQUENT HOSPITAL CARE,LEVL II: ICD-10-PCS | Mod: ,,, | Performed by: NURSE PRACTITIONER

## 2018-11-12 PROCEDURE — P9045 ALBUMIN (HUMAN), 5%, 250 ML: HCPCS | Mod: JG | Performed by: STUDENT IN AN ORGANIZED HEALTH CARE EDUCATION/TRAINING PROGRAM

## 2018-11-12 PROCEDURE — 63600175 PHARM REV CODE 636 W HCPCS: Performed by: STUDENT IN AN ORGANIZED HEALTH CARE EDUCATION/TRAINING PROGRAM

## 2018-11-12 PROCEDURE — 25000003 PHARM REV CODE 250: Performed by: TRANSPLANT SURGERY

## 2018-11-12 PROCEDURE — 20000000 HC ICU ROOM

## 2018-11-12 PROCEDURE — 99232 SBSQ HOSP IP/OBS MODERATE 35: CPT | Mod: 24,,, | Performed by: SURGERY

## 2018-11-12 PROCEDURE — 84450 TRANSFERASE (AST) (SGOT): CPT

## 2018-11-12 PROCEDURE — 83735 ASSAY OF MAGNESIUM: CPT

## 2018-11-12 PROCEDURE — 84100 ASSAY OF PHOSPHORUS: CPT

## 2018-11-12 PROCEDURE — 99900035 HC TECH TIME PER 15 MIN (STAT)

## 2018-11-12 PROCEDURE — 99900026 HC AIRWAY MAINTENANCE (STAT)

## 2018-11-12 PROCEDURE — 94003 VENT MGMT INPAT SUBQ DAY: CPT

## 2018-11-12 PROCEDURE — 85025 COMPLETE CBC W/AUTO DIFF WBC: CPT | Mod: 91

## 2018-11-12 PROCEDURE — 80069 RENAL FUNCTION PANEL: CPT

## 2018-11-12 RX ORDER — FENTANYL CITRATE 50 UG/ML
INJECTION, SOLUTION INTRAMUSCULAR; INTRAVENOUS
Status: DISPENSED
Start: 2018-11-12 | End: 2018-11-13

## 2018-11-12 RX ORDER — MYCOPHENOLATE MOFETIL 250 MG/1
1000 CAPSULE ORAL 2 TIMES DAILY
Qty: 240 CAPSULE | Refills: 2 | Status: SHIPPED | OUTPATIENT
Start: 2018-11-12 | End: 2019-01-08 | Stop reason: SDUPTHER

## 2018-11-12 RX ORDER — MAGNESIUM SULFATE HEPTAHYDRATE 40 MG/ML
2 INJECTION, SOLUTION INTRAVENOUS
Status: DISPENSED | OUTPATIENT
Start: 2018-11-12 | End: 2018-11-13

## 2018-11-12 RX ORDER — HYDROCODONE BITARTRATE AND ACETAMINOPHEN 500; 5 MG/1; MG/1
TABLET ORAL
Status: DISCONTINUED | OUTPATIENT
Start: 2018-11-12 | End: 2018-11-13

## 2018-11-12 RX ORDER — TACROLIMUS 1 MG/1
6 CAPSULE ORAL EVERY 12 HOURS
Qty: 360 CAPSULE | Refills: 11 | Status: SHIPPED | OUTPATIENT
Start: 2018-11-12 | End: 2019-01-08 | Stop reason: SDUPTHER

## 2018-11-12 RX ORDER — POTASSIUM CHLORIDE 29.8 MG/ML
40 INJECTION INTRAVENOUS ONCE
Status: COMPLETED | OUTPATIENT
Start: 2018-11-12 | End: 2018-11-12

## 2018-11-12 RX ORDER — HYDROCODONE BITARTRATE AND ACETAMINOPHEN 500; 5 MG/1; MG/1
TABLET ORAL CONTINUOUS
Status: ACTIVE | OUTPATIENT
Start: 2018-11-12 | End: 2018-11-13

## 2018-11-12 RX ORDER — ALBUMIN HUMAN 50 G/1000ML
25 SOLUTION INTRAVENOUS ONCE
Status: COMPLETED | OUTPATIENT
Start: 2018-11-12 | End: 2018-11-12

## 2018-11-12 RX ORDER — FAMOTIDINE 20 MG/1
20 TABLET, FILM COATED ORAL DAILY
Qty: 30 TABLET | Refills: 11 | Status: SHIPPED | OUTPATIENT
Start: 2018-11-12 | End: 2019-01-08 | Stop reason: HOSPADM

## 2018-11-12 RX ORDER — LEVALBUTEROL INHALATION SOLUTION 0.63 MG/3ML
0.63 SOLUTION RESPIRATORY (INHALATION) EVERY 4 HOURS PRN
Status: DISCONTINUED | OUTPATIENT
Start: 2018-11-12 | End: 2018-11-12

## 2018-11-12 RX ORDER — PREDNISONE 10 MG/1
TABLET ORAL
Qty: 40 TABLET | Refills: 0 | Status: SHIPPED | OUTPATIENT
Start: 2018-11-12 | End: 2019-01-09

## 2018-11-12 RX ORDER — FLUCONAZOLE 200 MG/1
200 TABLET ORAL DAILY
Qty: 30 TABLET | Refills: 0 | Status: SHIPPED | OUTPATIENT
Start: 2018-11-11 | End: 2019-01-08 | Stop reason: HOSPADM

## 2018-11-12 RX ORDER — FENTANYL CITRATE 50 UG/ML
25 INJECTION, SOLUTION INTRAMUSCULAR; INTRAVENOUS
Status: DISCONTINUED | OUTPATIENT
Start: 2018-11-12 | End: 2018-11-13

## 2018-11-12 RX ORDER — SULFAMETHOXAZOLE AND TRIMETHOPRIM 400; 80 MG/1; MG/1
1 TABLET ORAL DAILY
Qty: 30 TABLET | Refills: 5 | Status: SHIPPED | OUTPATIENT
Start: 2018-11-11 | End: 2019-01-23 | Stop reason: ALTCHOICE

## 2018-11-12 RX ORDER — VALGANCICLOVIR 450 MG/1
450 TABLET, FILM COATED ORAL DAILY
Qty: 30 TABLET | Refills: 5 | Status: SHIPPED | OUTPATIENT
Start: 2018-11-11 | End: 2019-01-23 | Stop reason: SDUPTHER

## 2018-11-12 RX ADMIN — SODIUM CHLORIDE: 0.9 INJECTION, SOLUTION INTRAVENOUS at 02:11

## 2018-11-12 RX ADMIN — FAMOTIDINE 20 MG: 10 INJECTION, SOLUTION INTRAVENOUS at 08:11

## 2018-11-12 RX ADMIN — Medication 1000 MG: at 09:11

## 2018-11-12 RX ADMIN — HEPARIN SODIUM 5000 UNITS: 5000 INJECTION, SOLUTION INTRAVENOUS; SUBCUTANEOUS at 05:11

## 2018-11-12 RX ADMIN — FENTANYL CITRATE 25 MCG: 50 INJECTION INTRAMUSCULAR; INTRAVENOUS at 02:11

## 2018-11-12 RX ADMIN — FLUCONAZOLE IN SODIUM CHLORIDE 200 MG: 2 INJECTION, SOLUTION INTRAVENOUS at 03:11

## 2018-11-12 RX ADMIN — ALBUMIN HUMAN 25 G: 0.05 INJECTION, SOLUTION INTRAVENOUS at 03:11

## 2018-11-12 RX ADMIN — CIPROFLOXACIN 400 MG: 2 INJECTION, SOLUTION INTRAVENOUS at 04:11

## 2018-11-12 RX ADMIN — SODIUM CHLORIDE 13.5 UNITS/HR: 9 INJECTION, SOLUTION INTRAVENOUS at 03:11

## 2018-11-12 RX ADMIN — PHYTONADIONE 10 MG: 10 INJECTION, EMULSION INTRAMUSCULAR; INTRAVENOUS; SUBCUTANEOUS at 05:11

## 2018-11-12 RX ADMIN — METHYLPREDNISOLONE SODIUM SUCCINATE 100 MG: 125 INJECTION, POWDER, FOR SOLUTION INTRAMUSCULAR; INTRAVENOUS at 08:11

## 2018-11-12 RX ADMIN — NYSTATIN 500000 UNITS: 500000 SUSPENSION ORAL at 08:11

## 2018-11-12 RX ADMIN — PROPOFOL 35 MCG/KG/MIN: 10 INJECTION, EMULSION INTRAVENOUS at 11:11

## 2018-11-12 RX ADMIN — POTASSIUM CHLORIDE 40 MEQ: 400 INJECTION, SOLUTION INTRAVENOUS at 06:11

## 2018-11-12 RX ADMIN — HEPARIN SODIUM 5000 UNITS: 5000 INJECTION, SOLUTION INTRAVENOUS; SUBCUTANEOUS at 09:11

## 2018-11-12 RX ADMIN — Medication 1000 MG: at 10:11

## 2018-11-12 RX ADMIN — PROPOFOL 45 MCG/KG/MIN: 10 INJECTION, EMULSION INTRAVENOUS at 04:11

## 2018-11-12 RX ADMIN — Medication 0.08 MCG/KG/MIN: at 05:11

## 2018-11-12 RX ADMIN — Medication 1 MG: at 08:11

## 2018-11-12 RX ADMIN — HEPARIN SODIUM 5000 UNITS: 5000 INJECTION, SOLUTION INTRAVENOUS; SUBCUTANEOUS at 03:11

## 2018-11-12 RX ADMIN — AMPICILLIN SODIUM AND SULBACTAM SODIUM 3 G: 2; 1 INJECTION, POWDER, FOR SOLUTION INTRAMUSCULAR; INTRAVENOUS at 12:11

## 2018-11-12 RX ADMIN — PROPOFOL 45 MCG/KG/MIN: 10 INJECTION, EMULSION INTRAVENOUS at 01:11

## 2018-11-12 RX ADMIN — Medication 2 MG: at 06:11

## 2018-11-12 NOTE — PLAN OF CARE
No acute events throughout shift  -160  Accu cks q 1 hr   CBC, Pt-INR, & AST q 4 hr  BMP q 12 hr  Vent: SIMV 40% FiO2 & 5 PEEP w/ O2 sats 100%  Pt opens eyes & follows commands/moves all extremities  Gtts: levo @ 0.1 mcg/kg/min; propofol @ 40 mcg/kg/min; insulin infusion adjusted per nomogram; D5 0.45 NS @ 50 cc/hr  See flow sheet for JOSE A output details   No blood products administered throughout night  5% albumin 25g administered x 1 d/t hypotension & CVP of 3 mmHg  CVPs 3-6 mmHg  Pt remains oliguric; 7 cc tea-colored urine throughout night   No new skin breakdown noted  All VS currently stable @ this time   Plan of care reviewed w/ pt   Will continue to monitor closely

## 2018-11-12 NOTE — PROGRESS NOTES
Dr. Thomas notified of pt's CVP of 3 mmHg from 5 mmHg. MD also aware pt remains oliguric. Ordered to administer 5% albumin 25g. Orders carried out. Will continue to monitor closely.

## 2018-11-12 NOTE — PROGRESS NOTES
[facility]  Liver Transplant  Progress Note    Patient Name: Femi Enciso  MRN: [id number]  Admission Date: 10/27/2018  Hospital Length of Stay: 16 days  Code Status: Full Code  Primary Care Provider: Primary Doctor No  Post-Op Day 1 Day Post-Op      ORGAN:   LIVER  Disease Etiology: Alcoholic Cirrhosis  Donor Type:    - Brain Death  CDC High Risk:   No  Donor CMV Status:   Donor CMV Status: Positive  Donor HBcAB:   Negative  Donor HCV Status:   Negative  Donor HBV JAVIER: Negative  Donor HCV JAVIER: Negative  Whole or Partial: Whole Liver  Biliary Anastomosis: End to End  Arterial Anatomy: Standard    Subjective:     Scheduled Meds:   albumin human 5%  25 g Intravenous Once    albumin human 5%  25 g Intravenous Once    ciprofloxacin  400 mg Intravenous Q12H    famotidine (PF)  20 mg Intravenous Daily    fluconazole (DIFLUCAN) IVPB  200 mg Intravenous Q24H    heparin (porcine)  5,000 Units Subcutaneous Q8H    methylPREDNISolone sodium succinate  100 mg Intravenous Q12H    Followed by    [START ON 2018] methylPREDNISolone sodium succinate  80 mg Intravenous Q12H    Followed by    [START ON 2018] methylPREDNISolone sodium succinate  60 mg Intravenous Q12H    Followed by    [START ON 11/15/2018] methylPREDNISolone sodium succinate  40 mg Intravenous Q12H    Followed by    [START ON 2018] methylPREDNISolone sodium succinate  20 mg Intravenous Q12H    Followed by    [START ON 2018] predniSONE  20 mg Oral Daily    mycophenolate mofetil  1,000 mg Oral BID    tacrolimus  1 mg Oral BID    [START ON 2018] valganciclovir 50 mg/ml  450 mg Per NG tube Daily     Continuous Infusions:   insulin (HUMAN R) infusion (adults) 0.7 Units/hr (18 1300)    norepinephrine bitartrate-D5W 0.02 mcg/kg/min (18 1300)    propofol 35 mcg/kg/min (18 1300)    vasopressin (PITRESSIN) infusion Stopped (18 1600)     PRN Meds:sodium chloride, sodium chloride,  sodium chloride, dextrose 50%, dextrose 50%    Review of Systems  Objective:     Vital Signs (Most Recent):  Temp: 97.2 °F (36.2 °C) (11/12/18 1100)  Pulse: 66 (11/12/18 1315)  Resp: 16 (11/12/18 1315)  BP: 100/65 (11/12/18 1300)  SpO2: 100 % (11/12/18 1315) Vital Signs (24h Range):  Temp:  [96.5 °F (35.8 °C)-98.4 °F (36.9 °C)] 97.2 °F (36.2 °C)  Pulse:  [64-86] 66  Resp:  [2-20] 16  SpO2:  [100 %] 100 %  BP: ()/(43-71) 100/65  Arterial Line BP: ()/(35-66) 103/50     Weight: 92.3 kg (203 lb 7.8 oz)  Body mass index is 29.2 kg/m².    Intake/Output - Last 3 Shifts       11/10 0700 - 11/11 0659 11/11 0700 - 11/12 0659 11/12 0700 - 11/13 0659    P.O. 540      I.V. (mL/kg) 5100 (60.4) 8942 (96.9)     Blood 3956 7059 418    Other  250     NG/GT  35     Total Intake(mL/kg) 9596 (113.7) 14477 (176.4) 418 (4.5)    Urine (mL/kg/hr) 0 (0) 22 (0) 0 (0)    Emesis/NG output  300     Drains 2000 2462 635    Other  175     Stool 0      Blood 6000 9100     Total Output 8000 67755 635    Net +1596 +4227 -217           Urine Occurrence 1 x      Stool Occurrence 5 x            Physical Exam   Eyes:   +jaundice   Cardiovascular: Normal rate, regular rhythm and intact distal pulses.   Pulmonary/Chest:   Coarse BS b/l   Abdominal:   3 JOSE A drain with SS output  Chevron incision c/d/i   Musculoskeletal: He exhibits edema.   Neurological:   Intubated, sedated   Skin:   Diffuse jaundice       Laboratory:  Immunosuppressants         Stop Route Frequency     tacrolimus (PROGRAF) 1 mg/mL oral syringe      -- Oral 2 times daily     mycophenolate mofetil 200 mg/mL suspension 1,000 mg      -- Oral 2 times daily        Labs within the past 24 hours have been reviewed.    Diagnostic Results:  I have personally reviewed all pertinent imaging studies.    Assessment/Plan:   Femi Enciso is a 53 y.o. male     Derm   Acute maculopapular rash    - morbilliform rash likely from drug reaction possibly Cefepime (10/27-10/30).  Per patient rash is  very pruritic.  He has been using steroid cream w minimal relief.  He stated significant improvement w receiving Hydroxyzine.  Monitor.         Renal/   Metabolic acidosis    - well managed on sodium bicarb.  Trend daily.        DEL (acute kidney injury)    - DEL over past 2 weeks.  Nephrology was consulted.  Pt not a candidate for kidney transplant before 12-4-18.    - pt tolerating HD w no fluid removed today given hypotension.    - pt w offer for liver transplant this evening.  He will receive intra-op HD.    - cont strict I/O's.         Hematology   Coagulopathy    - to improve w transplant.  No evidence of acute bleeding, no hematemesis or BRBPR.         Endocrine   Moderate protein malnutrition    - temporal muscle wasting noted.  Decreased appetite and energy over past week worse.   Dietician consulted on admit.  Will consult post transplant as needed.         GI   * S/P liver transplant      53 year old male with history of ESLD 2/2 ETOH cirrhosis who is s/p liver transplant. POD#1    Neuro  -wean sedation as tolerated   -PRN pain meds   -monitor neuro exam off sedation     CV  -wean pressor support to maintain systolic BP >100  -monitor on telemetry     Resp  -on SIMV mechanical ventilator   -If patient responds to blood transfusion appropriately will wean vent to extubation     Heme  -transfuse 2u pRBC   -f/u post-transfusion CBC     ID  -monitor fever and WBC     MSK  -turn patient q2  -OOBTC once extubated     FEN/GI  -replace lytes   -NPO while intubated     Endocrine  -insulin as needed. Monitor BG     dispo  -continue ICU care      Hepatorenal syndrome    - del past 2 weeks.  Pt on Midodrine.  Last HD 11/9/18- 0 fluid removed 2/2 hypotension.  - pt will receive intra op HD.         Jaundice    - t bili 37.5.  Monitor.       Other   Pre-transplant evaluation for liver transplant      54 y/o man with DM2, ESLD secondary to EtOH abuse in 09/2018, DEL progressing to ESRD requiring RRT. He has had  worsening renal function since September 2018. His last drink was in 09/2018. He has had no surgical procedures on abdomen and has been mobilizing well till admission. He may qualify as SLK candidate in view of renal function deterioration over 6 weeks. He needs Transplant nephrology opinion about candidacy for SLK transplant. His cardiac evaluation needs to be updated and cross sectional imaging is needed prior to activation. He is  Surgically acceptable & SC risk A candidate on surgical evaluation. Addiction Psych consult shall be made during this admission.         The patients clinical status was discussed at multidisplinary rounds, involving transplant surgery, transplant medicine, pharmacy, nursing, nutrition, and social work.    Nadia Michael MD  Liver Transplant  [facility]

## 2018-11-12 NOTE — ASSESSMENT & PLAN NOTE
Nutrition Problem  Malnutrition in the context of Chronic Illness/Injury    Related to (etiology):  Cirrhosis and poor appetite    Signs and Symptoms (as evidenced by):  Energy Intake: <75% of estimated energy requirement for 2 months  Body Fat Depletion: moderate depletion of orbitals and triceps   Muscle Mass Depletion: moderate depletion of temples, clavicle region and scapular region   Weight Loss: 14% x 2 months (per patient, unable to verify per chart review)    Interventions/Recommendations (treatment strategy):  Full assessment completed, see RD Note 11/12/2018.    Nutrition Diagnosis Status:  Continues

## 2018-11-12 NOTE — PLAN OF CARE
Problem: Patient Care Overview  Goal: Plan of Care Review  Recommendations  Recommendation/Intervention:   1. When able to extubate, ADAT to Diabetic, Renal with texture per SLP.   2. If unable to extubate, RD to provide TF recommendations.   3. TSU RD to provide post-transplant diet education when appropriate.   RD to monitor.    Goals: Patient to receive nutrition by RD follow-up  Nutrition Goal Status: new    Full assessment completed, see RD Note 11/12/2018.

## 2018-11-12 NOTE — PROGRESS NOTES
TRANSPLANT NOTE:    Admit Date: 10/27/2018    ORGAN:   LIVER  Disease Etiology: Alcoholic Cirrhosis  Donor CMV Status: Positive  Donor HCV Status: Negative  Donor HBcAb: Negative  Donor HBV JAVIER: Negative  Donor HCV JAVIER: Negative  Whole or Partial: Whole Liver  Biliary Anastomosis: End to End  Arterial Anatomy: Standard    Femi Enciso is a 53 y.o. male s/p     - Brain Death liver transplant on 2018 (Liver) for Alcoholic Cirrhosis.  This patient will follow the Steroid Induction protocol.  This patients immunosuppression will include a steroid taper over 6 weeks, Cellcept for 12 weeks and Prograf maintenance.  Opportunistic infection prophylaxis will include Valcyte for 6 months (CMV D+ R-), Bactrim for 6 months, and nystatin.  I have reviewed the pre-op medications and those have been restarted those, as appropriate.

## 2018-11-12 NOTE — PT/OT/SLP DISCHARGE
Physical Therapy Discharge Summary    Name: Femi Enciso  MRN: [id number]   Principal Problem: S/P liver transplant     Patient Discharged from acute Physical Therapy on 2018.  Please refer to prior PT noted date on 2018 for functional status.     Assessment:     Patient has not met goals.    Objective:     GOALS:   Multidisciplinary Problems     Physical Therapy Goals     Not on file          Multidisciplinary Problems (Resolved)        Problem: Physical Therapy Goal    Goal Priority Disciplines Outcome Goal Variances Interventions   Physical Therapy Goal   (Resolved)     PT, PT/OT Outcome(s) achieved     Description:  Goals to be met by: 2018     Patient will increase functional independence with mobility by performin. Supine to sit with Modified [place]  2. Sit to stand transfer with [place]  3. Bed to chair transfer with independence using no AD  4. Gait  x 50 feet with Supervision using LRAD.   5. Lower extremity exercise program x20 reps per handout, with independence                      Reasons for Discontinuation of Therapy Services  Patient is unable to continue work toward goals because of medical or psychosocial complications.      Plan:     Patient Discharged to: s/p liver txp; new orders present .    GEOVANNY PAGAN, PT  2018

## 2018-11-12 NOTE — ANESTHESIA POSTPROCEDURE EVALUATION
"Anesthesia Post Evaluation    Patient: Femi Enciso    Procedure(s) Performed: Procedure(s) (LRB):  TRANSPLANT, LIVER (N/A)    Final Anesthesia Type: general  Patient location during evaluation: ICU  Patient participation: No - Unable to Participate, Sedation  Level of consciousness: sedated  Post-procedure vital signs: reviewed and stable  Pain management: adequate  Airway patency: patent  PONV status at discharge: No PONV  Anesthetic complications: no      Cardiovascular status: blood pressure returned to baseline  Respiratory status: ETT  Hydration status: euvolemic  Follow-up not needed.        Visit Vitals  BP 93/61 (BP Location: Left arm, Patient Position: Lying)   Pulse 69   Temp 36.3 °C (97.3 °F) (Core ([place]))   Resp 16   Ht 5' 10" (1.778 m)   Wt 92.3 kg (203 lb 7.8 oz)   SpO2 100%   BMI 29.20 kg/m²       Pain/Torrey Score: Pain Assessment Performed: Yes (11/12/2018  3:00 AM)  Presence of Pain: non-verbal indicators absent (11/12/2018  3:00 AM)        "

## 2018-11-12 NOTE — PROGRESS NOTES
[facility]  Nephrology  Progress Note    Patient Name: Femi Enciso  MRN: [id number]  Admission Date: 10/27/2018  Hospital Length of Stay: 16 days  Attending Provider: Femi Patel MD   Primary Care Physician: Primary Doctor No  Principal Problem:S/P liver transplant    Subjective:     HPI: 54 y/o man with DM2 presents to the ED with family for liver failure (likely due to EtOH abundant history of drinking - diagnosed in Sept 2018 in [place]).  He reports jaundice, generalized weakness, nausea, diarrhea, and decreased appetite since Sept 2018.  He is from [place], [place], and Dr. Sharma (patients physician) recommended bringing him to hospital for evaluation.  Patient denies any fever, chills, vomiting, chest pain, palpitations, SOB, abdominal pain.      Nephrology consulted for evaluation/management Adri.     Interval History:   Received OHLTx 11/11/2018 tolerated procedure well but required significant blood product transfusions and fluid shifts. Hb was low again and was transfused 3 unit of PRBC's.  Still on Mechanical ventilation FiO2 40% and PEEP 5. Remains anuric. Weaning Norepi. FLuid balance is ~ 4.1 lts up but he has significant extrarenal fluid losses. Drains output today is already 1 lt. He had intraop SLED.  Review of patient's allergies indicates:   Allergen Reactions    Penicillins Nausea And Vomiting and Rash     Current Facility-Administered Medications   Medication Frequency    0.9%  NaCl infusion (CRRT USE ONLY) Continuous    0.9%  NaCl infusion (for blood administration) Q24H PRN    0.9%  NaCl infusion (for blood administration) Q24H PRN    0.9%  NaCl infusion (for blood administration) Q24H PRN    albumin human 5% bottle 25 g Once    albumin human 5% bottle 25 g Once    ciprofloxacin (CIPRO)400mg/200ml D5W IVPB 400 mg Q12H    dextrose 50% injection 12.5 g PRN    dextrose 50% injection 25 g PRN    famotidine (PF) injection 20 mg Daily    fentaNYL injection 25 mcg Q2H PRN     fluconazole (DIFLUCAN) IVPB 200 mg 100 mL Q24H    heparin (porcine) injection 5,000 Units Q8H    insulin regular (Humulin R) 100 Units in sodium chloride 0.9% 100 mL infusion Continuous    magnesium sulfate 2g in water 50mL IVPB (premix) PRN    methylPREDNISolone sodium succinate injection 100 mg Q12H    Followed by    [START ON 11/13/2018] methylPREDNISolone sodium succinate injection 80 mg Q12H    Followed by    [START ON 11/14/2018] methylPREDNISolone sodium succinate injection 60 mg Q12H    Followed by    [START ON 11/15/2018] methylPREDNISolone sodium succinate injection 40 mg Q12H    Followed by    [START ON 11/16/2018] methylPREDNISolone sodium succinate injection 20 mg Q12H    Followed by    [START ON 11/17/2018] predniSONE tablet 20 mg Daily    mycophenolate mofetil 200 mg/mL suspension 1,000 mg BID    norepinephrine 4 mg in dextrose 5% 250 mL infusion (premix) (titrating) Continuous    propofol (DIPRIVAN) 10 mg/mL infusion Continuous    tacrolimus (PROGRAF) 1 mg/mL oral syringe BID    [START ON 11/21/2018] valganciclovir 50 mg/ml oral solution 450 mg Daily    vasopressin (PITRESSIN) 0.2 Units/mL in dextrose 5 % 100 mL infusion Continuous       Objective:     Vital Signs (Most Recent):  Temp: 97.3 °F (36.3 °C) (11/12/18 1500)  Pulse: 92 (11/12/18 1615)  Resp: (!) 22 (11/12/18 1615)  BP: (!) 81/52 (11/12/18 1600)  SpO2: 100 % (11/12/18 1615)  O2 Device (Oxygen Therapy): ventilator (11/12/18 1400) Vital Signs (24h Range):  Temp:  [96.5 °F (35.8 °C)-97.7 °F (36.5 °C)] 97.3 °F (36.3 °C)  Pulse:  [64-97] 92  Resp:  [2-24] 22  SpO2:  [98 %-100 %] 100 %  BP: ()/(48-71) 81/52  Arterial Line BP: ()/(35-66) 104/49     Weight: 92.3 kg (203 lb 7.8 oz) (11/12/18 0334)  Body mass index is 29.2 kg/m².  Body surface area is 2.14 meters squared.    I/O last 3 completed shifts:  In: 85196 [I.V.:28951; Blood:46168; Other:250; NG/GT:35]  Out: 20154 [Urine:22; Emesis/NG output:300; Drains:4557;  Other:175; Blood:28215]    Physical Exam   Constitutional: He appears well-developed. He appears cachectic. He is cooperative. No distress. He is sedated and intubated.   Temporal musc wasting   HENT:   Head: Normocephalic and atraumatic.   Eyes: Conjunctivae are normal. Pupils are equal, round, and reactive to light.   Neck: Trachea normal. Neck supple. No JVD present.   Cardiovascular: Normal rate, regular rhythm, S1 normal, S2 normal and normal pulses. Exam reveals no gallop and no friction rub.   No murmur heard.  Pulmonary/Chest: Effort normal. He is intubated. He has decreased breath sounds in the right lower field and the left lower field. He has no rhonchi.   Abdominal: Soft. Bowel sounds are normal. He exhibits distension and ascites. There is no tenderness.   Musculoskeletal: Normal range of motion. He exhibits edema (edema 1+ pitting LE).   Neurological:   Sedated on mechanical ventilation   Skin: Skin is warm and dry. Capillary refill takes less than 2 seconds.   Psychiatric: He has a normal mood and affect. His behavior is normal.   Vitals reviewed.      Significant Labs:  ABGs:   Recent Labs   Lab 11/12/18  1457   PH 7.415   PCO2 38.7   HCO3 24.8   POCSATURATED 99   BE 0     BMP:   Recent Labs   Lab 11/12/18  0351 11/12/18  0808   * 126*    105   CO2 21* 22*   BUN 29* 31*   CREATININE 3.4* 3.6*   CALCIUM 8.1* 8.4*   MG 2.3  --      Cardiac Markers: No results for input(s): CKMB, TROPONINT, MYOGLOBIN in the last 168 hours.  CBC:   Recent Labs   Lab 11/12/18  1452   WBC 10.37   RBC 2.58*   HGB 8.2*   HCT 23.1*   PLT 50*   MCV 90   MCH 31.8*   MCHC 35.5     CMP:   Recent Labs   Lab 11/12/18  0351 11/12/18  0808 11/12/18  1157   * 126*  --    CALCIUM 8.1* 8.4*  --    ALBUMIN 2.7*  --   --    PROT 3.7*  --   --     137  --    K 3.4* 4.1  --    CO2 21* 22*  --     105  --    BUN 29* 31*  --    CREATININE 3.4* 3.6*  --    ALKPHOS 65  --   --    *  --   --    *   249* 211* 194*   BILITOT 8.6*  --   --      Coagulation:   Recent Labs   Lab 11/12/18  0350  11/12/18  1157   INR 1.7*  1.7*   < > 1.8*   APTT 38.0*  --   --     < > = values in this interval not displayed.     Recent Labs   Lab 11/09/18  1737   COLORU Green*   SPECGRAV 1.025   PHUR 6.5   PROTEINUA 2+*   BACTERIA Occasional   NITRITE Negative   LEUKOCYTESUR Trace*   HYALINECASTS 0     All labs within the past 24 hours have been reviewed.     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   - s/p OHLTx 11/11/2018   - remains anuric   - Had intra-op SLED  - CVP 3-4  Plan:  - Will start SLED-RRT for metabolic clearance and volume management x 12 hrs   - -300 ml/hr as tolerated   - Remains anuric  - Strict I/O and chart  - Avoid nephrotoxic medications  - please keep Hb > 7 gm/dL or higher if symptomatic  - Medication doses adjusted to GFR           Thank you for your consult. I will follow-up with patient. Please contact us if you have any additional questions.    Nikolay Nino MD  Nephrology  [facility]

## 2018-11-12 NOTE — PLAN OF CARE
Problem: Patient Care Overview  Goal: Plan of Care Review  Outcome: Ongoing (interventions implemented as appropriate)  Dr. Beavers at bedside this AM to discuss the plan of care.  Patient extubated per MD order and tolerated without difficulty.  Patient placed on CRRT and remains on low dose Levophed.  2 Units PRBC administered this AM. Patient is confused but cooperative.  Dr. Michael aware of all lab results throughout the shift.  Refer to flow sheet for vital signs, ggts, and assessments.  Will continue to monitor.  Questions and concerns addressed with patient and his family.

## 2018-11-12 NOTE — ASSESSMENT & PLAN NOTE
S/P liver transplant     54 y/o man with ESLD, ESRD and DM2 now s/p liver transplant     Neuro:   - Sedation: propofol gtt  - Pain control: no analgesia currently     Pulmonary:   - intubated  - daily cxr  - duonebs prn     Cardiac:  - Drips: levo gtt  - mgmt per transplant     Renal:   -CRRT intraop  -trend BMP     Infectious Disease:   - AF  - Trend WBC  - Prophylaxis per transplant  - IS per Transplant     Hematology/Oncology:  - H&H 7.4/20.8  - Plt 84  - INR 1.7  - Trend CBC     Endocrine:  - q1h accuchecks  - insulin gtt     Fluids/Electrolytes/Nutrition/GI:   - NPO; once extubated ADAT per Transplant  - Trend CMP  - Replace Lytes PRN  - liver US likely 11/12  - D5 1/2NS @ 100      Prophylaxis:  - SQH  - Famotidine     Dispo:  Continue ICU care  Primary team:Transplant

## 2018-11-12 NOTE — HOSPITAL COURSE
11/11: s/p liver transplant  11/12 - extubated, weaned off pressors; pulled out all 3 JOSE A drains  11/13 - CRRT held overnight; 1U Plt  11/14 -2u pRBC  11/15- 1u prbc  11/16- OR x 2 for bleeding, abthera + JOSE A x 1, 7 PRBC, 3 plt, 1 FFP  11/17 - 2 Plt, 1 FFP, 2 RBCs

## 2018-11-12 NOTE — PROGRESS NOTES
[facility]  Critical Care - Surgery  Progress Note    Patient Name: Femi Enciso  MRN: [id number]  Admission Date: 10/27/2018  Hospital Length of Stay: 16 days  Code Status: Full Code  Attending Provider: Femi Patel MD  Primary Care Provider: Primary Doctor No   Principal Problem: S/P liver transplant    Subjective:     Hospital/ICU Course:  11/11: s/p liver transplant    On insulin gtt, levo gtt    Interval History:   Pt with decreased UOP overnight. Received albumin, lasix. Pt remains intubated, sedated. Pt on insulin, levo gtt.     Follow-up For: Procedure(s) (LRB):  TRANSPLANT, LIVER (N/A)    Post-Operative Day: Day of Surgery     Past Medical History:   Diagnosis Date    Liver cirrhosis, alcoholic 09/30/2018       Past Surgical History:   Procedure Laterality Date    EGD (ESOPHAGOGASTRODUODENOSCOPY) N/A 11/6/2018    Performed by Duarte Jules MD at [facility] ([address] [place] [address])    ESOPHAGOGASTRODUODENOSCOPY N/A 11/6/2018    Procedure: EGD (ESOPHAGOGASTRODUODENOSCOPY);  Surgeon: Duarte Jules MD;  [facility]  Service: Endoscopy;  Laterality: N/A;    INSERTION OF TUNNELED CENTRAL VENOUS HEMODIALYSIS CATHETER N/A 11/7/2018    Procedure: INSERTION, CATHETER, CENTRAL VENOUS, HEMODIALYSIS, TUNNELED;  Surgeon: Brock Gonzalez MD;  Location: [facility] CATH LAB;  Service: Nephrology;  Laterality: N/A;    INSERTION, CATHETER, CENTRAL VENOUS, HEMODIALYSIS, TUNNELED N/A 11/7/2018    Performed by Brock Gonzalez MD at [facility] CATH LAB       Review of patient's allergies indicates:   Allergen Reactions    Cefepime Rash    Penicillins Nausea And Vomiting and Rash     Full body rash from cefepime as well       Family History     None        Tobacco Use    Smoking status: Never Smoker   Substance and Sexual Activity    Alcohol use: Not on file    Drug use: Not on file    Sexual activity: Not on file      Review of Systems   Unable to perform ROS: Intubated     Objective:     Vital  Signs (Most Recent):  Temp: 96.5 °F (35.8 °C) (11/12/18 0800)  Pulse: 67 (11/12/18 0800)  Resp: 16 (11/12/18 0800)  BP: (!) 93/57 (11/12/18 0800)  SpO2: 100 % (11/12/18 0800) Vital Signs (24h Range):  Temp:  [96.5 °F (35.8 °C)-98.6 °F (37 °C)] 96.5 °F (35.8 °C)  Pulse:  [67-86] 67  Resp:  [3-18] 16  SpO2:  [96 %-100 %] 100 %  BP: (61-97)/(43-61) 93/57  Arterial Line BP: ()/(35-58) 102/46     Weight: 92.3 kg (203 lb 7.8 oz)  Body mass index is 29.2 kg/m².      Intake/Output Summary (Last 24 hours) at 11/12/2018 0809  Last data filed at 11/12/2018 0700  Gross per 24 hour   Intake 13594 ml   Output 7354 ml   Net 4772 ml       Physical Exam   Eyes:   +jaundice   Cardiovascular: Normal rate, regular rhythm and intact distal pulses.   Pulmonary/Chest:   Coarse BS b/l   Abdominal:   3 JOSE A drain with SS output  Chevron incision c/d/i   Musculoskeletal: He exhibits edema.   Neurological:   Intubated, sedated   Skin:   Diffuse jaundice       Vents:  Vent Mode: SIMV (11/12/18 0651)  Ventilator Initiated: Yes (11/11/18 1215)  Set Rate: 16 bmp (11/12/18 0651)  Vt Set: 500 mL (11/12/18 0651)  Pressure Support: 10 cmH20 (11/12/18 0651)  PEEP/CPAP: 5 cmH20 (11/12/18 0651)  Oxygen Concentration (%): 40 (11/12/18 0800)  Peak Airway Pressure: 22 cmH2O (11/12/18 0651)  Plateau Pressure: 0 cmH20 (11/12/18 0651)  Total Ve: 8.44 mL (11/12/18 0651)  F/VT Ratio<105 (RSBI): (!) 5.68 (11/12/18 0651)    Lines/Drains/Airways     Central Venous Catheter Line                 Percutaneous Central Line Insertion/Assessment - double lumen  right subclavian -- days         Tunneled Central Line Insertion/Assessment - Double Lumen  11/07/18 1350 right internal jugular 4 days         Percutaneous Central Line Insertion/Assessment - double lumen  11/11/18 0205 right femoral 1 day         Pulmonary Artery Catheter Assessment  11/11/18 0236 1 day          Drain                 Urethral Catheter 11/11/18 0246 Straight-tip;Non-latex 16 Fr. 1 day          Closed/Suction Drain 11/11/18 1124 Right Abdomen Bulb 19 Fr. less than 1 day         Closed/Suction Drain 11/11/18 1127 Right Abdomen Bulb 19 Fr. less than 1 day         Closed/Suction Drain 11/11/18 1128 Left Abdomen Bulb 19 Fr. less than 1 day         NG/OG Tube 11/11/18 0900 orogastric Center mouth less than 1 day          Airway                 Airway - Non-Surgical 11/11/18 0158 Endotracheal Tube 1 day          Arterial Line                 Arterial Line 11/11/18 0159 Left Radial 1 day         Arterial Line 11/11/18 0205 Right Femoral 1 day          Peripheral Intravenous Line                 Peripheral IV - Single Lumen 11/09/18 1700 Right Antecubital 2 days         Peripheral IV - Single Lumen 11/11/18 0212 Left Antecubital 1 day                Significant Labs:    CBC/Anemia Profile:  Recent Labs   Lab 11/11/18 1908 11/11/18 2359 11/12/18  0350   WBC 10.86 13.22*  13.22* 12.90*  12.90*  12.90*   HGB 8.3* 8.5*  8.5* 7.4*  7.4*  7.4*   HCT 24.3* 24.5*  24.5* 20.8*  20.8*  20.8*   PLT 84* 106*  106* 84*  84*  84*   MCV 95 95  95 92  92  92   RDW 15.3* 15.4*  15.4* 15.1*  15.1*  15.1*        Chemistries:  Recent Labs   Lab 11/10/18  2317  11/11/18  0802  11/11/18  1052 11/11/18  1210 11/11/18  1212  11/11/18 1908 11/11/18 2359 11/12/18  0351      < > 136   < > 138 139  --   --  139  --  138   K 5.1   < > 3.0*   < > 3.8 4.1  --   --  3.8  --  3.4*     --   --   --   --  107  --   --  106  --  104   CO2 24  --   --   --   --  23  --   --  22*  --  21*   BUN 39*  --   --   --   --  16  --   --  24*  --  29*   CREATININE 6.4*  --   --   --   --  2.2*  --   --  3.0*  --  3.4*   CALCIUM 8.9  --   --   --   --  9.5  --   --  8.7  --  8.1*   ALBUMIN 2.2*   < > 1.7*   < > 2.0* 2.0*  --   --   --   --  2.7*   PROT 5.2*  --   --   --   --  3.1*  --   --   --   --  3.7*   BILITOT 37.2*  --   --   --   --  11.3*  --   --   --   --  8.6*   ALKPHOS 228*  --   --   --   --  62  --   --   --    --  65   ALT 28  --  586*  --   --  466*  --   --   --   --  310*   AST 70*  --  597*  --   --  794*  --    < > 466* 351* 249*  249*   MG 1.5*   < > 1.6   < > 1.8  --  1.8  --   --   --  2.3   PHOS  --   --   --   --   --   --  4.9*  --   --   --  4.0    < > = values in this interval not displayed.       All pertinent labs within the past 24 hours have been reviewed.    Significant Imaging: I have reviewed all pertinent imaging results/findings within the past 24 hours.    Assessment/Plan:     * S/P liver transplant    S/P liver transplant     52 y/o man with ESLD, ESRD and DM2 now s/p liver transplant     Neuro:   - Sedation: propofol gtt  - Pain control: no analgesia currently     Pulmonary:   - intubated  - daily cxr  - duonebs prn     Cardiac:  - Drips: levo gtt  - mgmt per transplant     Renal:   -CRRT intraop  -trend BMP     Infectious Disease:   - AF  - Trend WBC  - Prophylaxis per transplant  - IS per Transplant     Hematology/Oncology:  - H&H  7.4/20.8  - Plt 84  - INR 1.7  - Trend CBC     Endocrine:  - q1h accuchecks  - insulin gtt     Fluids/Electrolytes/Nutrition/GI:   - NPO; once extubated ADAT per Transplant  - Trend CMP  - Replace Lytes PRN  - liver US likely 11/12  - D5 1/2NS @ 100      Prophylaxis:  - SQH  - Famotidine     Dispo:  Continue ICU care  Primary team:Transplant            Critical care was time spent personally by me on the following activities: development of treatment plan with patient or surrogate and bedside caregivers, discussions with consultants, evaluation of patient's response to treatment, examination of patient, ordering and performing treatments and interventions, ordering and review of laboratory studies, ordering and review of radiographic studies, pulse oximetry, re-evaluation of patient's condition.  This critical care time did not overlap with that of any other provider or involve time for any procedures.

## 2018-11-12 NOTE — SUBJECTIVE & OBJECTIVE
Interval History:   Received OHLTx 11/11/2018 tolerated procedure well but required significant blood product transfusions and fluid shifts. Hb was low again and was transfused 3 unit of PRBC's.  Still on Mechanical ventilation FiO2 40% and PEEP 5. Remains anuric. Weaning Norepi. FLuid balance is ~ 4.1 lts up but he has significant extrarenal fluid losses. Drains output today is already 1 lt. He had intraop SLED.  Review of patient's allergies indicates:   Allergen Reactions    Penicillins Nausea And Vomiting and Rash     Current Facility-Administered Medications   Medication Frequency    0.9%  NaCl infusion (CRRT USE ONLY) Continuous    0.9%  NaCl infusion (for blood administration) Q24H PRN    0.9%  NaCl infusion (for blood administration) Q24H PRN    0.9%  NaCl infusion (for blood administration) Q24H PRN    albumin human 5% bottle 25 g Once    albumin human 5% bottle 25 g Once    ciprofloxacin (CIPRO)400mg/200ml D5W IVPB 400 mg Q12H    dextrose 50% injection 12.5 g PRN    dextrose 50% injection 25 g PRN    famotidine (PF) injection 20 mg Daily    fentaNYL injection 25 mcg Q2H PRN    fluconazole (DIFLUCAN) IVPB 200 mg 100 mL Q24H    heparin (porcine) injection 5,000 Units Q8H    insulin regular (Humulin R) 100 Units in sodium chloride 0.9% 100 mL infusion Continuous    magnesium sulfate 2g in water 50mL IVPB (premix) PRN    methylPREDNISolone sodium succinate injection 100 mg Q12H    Followed by    [START ON 11/13/2018] methylPREDNISolone sodium succinate injection 80 mg Q12H    Followed by    [START ON 11/14/2018] methylPREDNISolone sodium succinate injection 60 mg Q12H    Followed by    [START ON 11/15/2018] methylPREDNISolone sodium succinate injection 40 mg Q12H    Followed by    [START ON 11/16/2018] methylPREDNISolone sodium succinate injection 20 mg Q12H    Followed by    [START ON 11/17/2018] predniSONE tablet 20 mg Daily    mycophenolate mofetil 200 mg/mL suspension 1,000 mg BID     norepinephrine 4 mg in dextrose 5% 250 mL infusion (premix) (titrating) Continuous    propofol (DIPRIVAN) 10 mg/mL infusion Continuous    tacrolimus (PROGRAF) 1 mg/mL oral syringe BID    [START ON 11/21/2018] valganciclovir 50 mg/ml oral solution 450 mg Daily    vasopressin (PITRESSIN) 0.2 Units/mL in dextrose 5 % 100 mL infusion Continuous       Objective:     Vital Signs (Most Recent):  Temp: 97.3 °F (36.3 °C) (11/12/18 1500)  Pulse: 92 (11/12/18 1615)  Resp: (!) 22 (11/12/18 1615)  BP: (!) 81/52 (11/12/18 1600)  SpO2: 100 % (11/12/18 1615)  O2 Device (Oxygen Therapy): ventilator (11/12/18 1400) Vital Signs (24h Range):  Temp:  [96.5 °F (35.8 °C)-97.7 °F (36.5 °C)] 97.3 °F (36.3 °C)  Pulse:  [64-97] 92  Resp:  [2-24] 22  SpO2:  [98 %-100 %] 100 %  BP: ()/(48-71) 81/52  Arterial Line BP: ()/(35-66) 104/49     Weight: 92.3 kg (203 lb 7.8 oz) (11/12/18 0334)  Body mass index is 29.2 kg/m².  Body surface area is 2.14 meters squared.    I/O last 3 completed shifts:  In: 25342 [I.V.:67749; Blood:12269; Other:250; NG/GT:35]  Out: 20154 [Urine:22; Emesis/NG output:300; Drains:4557; Other:175; Blood:95934]    Physical Exam   Constitutional: He appears well-developed. He appears cachectic. He is cooperative. No distress. He is sedated and intubated.   Temporal musc wasting   HENT:   Head: Normocephalic and atraumatic.   Eyes: Conjunctivae are normal. Pupils are equal, round, and reactive to light.   Neck: Trachea normal. Neck supple. No JVD present.   Cardiovascular: Normal rate, regular rhythm, S1 normal, S2 normal and normal pulses. Exam reveals no gallop and no friction rub.   No murmur heard.  Pulmonary/Chest: Effort normal. He is intubated. He has decreased breath sounds in the right lower field and the left lower field. He has no rhonchi.   Abdominal: Soft. Bowel sounds are normal. He exhibits distension and ascites. There is no tenderness.   Musculoskeletal: Normal range of motion. He exhibits  edema (edema 1+ pitting LE).   Neurological:   Sedated on mechanical ventilation   Skin: Skin is warm and dry. Capillary refill takes less than 2 seconds.   Psychiatric: He has a normal mood and affect. His behavior is normal.   Vitals reviewed.      Significant Labs:  ABGs:   Recent Labs   Lab 11/12/18  1457   PH 7.415   PCO2 38.7   HCO3 24.8   POCSATURATED 99   BE 0     BMP:   Recent Labs   Lab 11/12/18  0351 11/12/18  0808   * 126*    105   CO2 21* 22*   BUN 29* 31*   CREATININE 3.4* 3.6*   CALCIUM 8.1* 8.4*   MG 2.3  --      Cardiac Markers: No results for input(s): CKMB, TROPONINT, MYOGLOBIN in the last 168 hours.  CBC:   Recent Labs   Lab 11/12/18  1452   WBC 10.37   RBC 2.58*   HGB 8.2*   HCT 23.1*   PLT 50*   MCV 90   MCH 31.8*   MCHC 35.5     CMP:   Recent Labs   Lab 11/12/18  0351 11/12/18  0808 11/12/18  1157   * 126*  --    CALCIUM 8.1* 8.4*  --    ALBUMIN 2.7*  --   --    PROT 3.7*  --   --     137  --    K 3.4* 4.1  --    CO2 21* 22*  --     105  --    BUN 29* 31*  --    CREATININE 3.4* 3.6*  --    ALKPHOS 65  --   --    *  --   --    *  249* 211* 194*   BILITOT 8.6*  --   --      Coagulation:   Recent Labs   Lab 11/12/18  0350  11/12/18  1157   INR 1.7*  1.7*   < > 1.8*   APTT 38.0*  --   --     < > = values in this interval not displayed.     Recent Labs   Lab 11/09/18  1737   COLORU Green*   SPECGRAV 1.025   PHUR 6.5   PROTEINUA 2+*   BACTERIA Occasional   NITRITE Negative   LEUKOCYTESUR Trace*   HYALINECASTS 0     All labs within the past 24 hours have been reviewed.     Significant Imaging:  Labs: Reviewed  US: Reviewed

## 2018-11-12 NOTE — PROGRESS NOTES
2nd note:    VITALIY met with patient and patient's caregiver/wife, Suzy. Patient intubated and caregiver presented as AAOx4. The caregiver reported that she was coping adequately with the support of family. SW inquired about completion of [employer] financial profile, and pt's caregiver has not completed profile at this time. VITALIY informed family that patient has been added to LR wait list, but fee still needs to be determined. Caregiver denies any current mental health difficulties. VITALIY will continue to follow patient for resources, education, support and dc planning as appropriate. SW remains available. VITALIY informed caregiver of 11/14/18 caregiver support group.

## 2018-11-12 NOTE — CONSULTS
"  [facility]  Adult Nutrition  Consult Note    SUMMARY     Recommendations  Recommendation/Intervention:   1. When able to extubate, ADAT to Diabetic, Renal with texture per SLP.   2. If unable to extubate, RD to provide TF recommendations.   3. TSU RD to provide post-transplant diet education when appropriate.   RD to monitor.    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 Currently intubated, sedated. (NFPE completed 10/31, patient with moderate malnutrition.)  Nutrition Discharge Planning: TSU RD to provide post-transplant diet education when appropriate.    Nutrition Risk Screen  Nutrition Risk Screen: no indicators present    Nutrition/Diet History  Patient Reported Diet/Restrictions/Preferences: general  Do you have any cultural, spiritual, Sikh conflicts, given your current situation?: none  Factors Affecting Nutritional Intake: NPO, on mechanical ventilation    Anthropometrics  Temp: 97 °F (36.1 °C)  Height Method: Stated  Height: 5' 10" (177.8 cm)  Height (inches): 70 in  Weight Method: Bed Scale  Weight: 92.3 kg (203 lb 7.8 oz)  Weight (lb): 203.49 lb  Ideal Body Weight (IBW), Male: 166 lb  % Ideal Body Weight, Male (lb): 119.13 lb  BMI (Calculated): 28.4  BMI Grade: 25 - 29.9 - overweight  Usual Body Weight (UBW), k.5 kg  % Usual Body Weight: 86.67  % Weight Change From Usual Weight: -13.51 %    Lab/Procedures/Meds  Pertinent Labs Reviewed: reviewed  Pertinent Labs Comments: K 3.4, BUN 29, Creat 3.4, Glu 176, POCT Glu 138-178, HgbA1c 4.4, Ca 8.1, Alb 2.7, T. bili 8.6  Pertinent Medications Reviewed: reviewed  Pertinent " Medications Comments: famotidne, methylprednisolone, prograf, insulin drip, levophed, propofol, vasopressin    Physical Findings/Assessment  Overall Physical Appearance: on ventilator support, edematous, loss of muscle mass  Tubes: orogastric tube(LIWS)  Oral/Mouth Cavity: tooth/teeth missing  Skin: edema, incision(s)    Estimated/Assessed Needs  Weight Used For Calorie Calculations: 83.7 kg (184 lb 8.4 oz)(dosing weight)  Energy Calorie Requirements (kcal): 1876 kcal/day  Energy Need Method: [facility]  Protein Requirements: 101-126 g/day(1.2-1.5 g/kg)  Weight Used For Protein Calculations: 83.7 kg (184 lb 8.4 oz)  Fluid Requirements (mL): 1 mL/kcal or per MD  Fluid Need Method: RDA Method  RDA Method (mL): 1876    Nutrition Prescription Ordered  Current Diet Order: NPO    Evaluation of Received Nutrient/Fluid Intake  Other Calories (kcal): 399(propofol)  I/O: +4.1L x 24hrs, +5.6L since admit  Comments: LBM 11/11  % Intake of Estimated Energy Needs: 0 - 25 %  % Meal Intake: NPO    Nutrition Risk  Level of Risk/Frequency of Follow-up: high(2x/week)     Assessment and Plan  Moderate protein malnutrition    Nutrition Problem  Malnutrition in the context of Chronic Illness/Injury    Related to (etiology):  Cirrhosis and poor appetite    Signs and Symptoms (as evidenced by):  Energy Intake: <75% of estimated energy requirement for 2 months  Body Fat Depletion: moderate depletion of orbitals and triceps   Muscle Mass Depletion: moderate depletion of temples, clavicle region and scapular region   Weight Loss: 14% x 2 months (per patient, unable to verify per chart review)    Interventions/Recommendations (treatment strategy):  Full assessment completed, see RD Note 11/12/2018.    Nutrition Diagnosis Status:  Continues     Monitor and Evaluation  Food and Nutrient Intake: energy intake  Food and Nutrient Adminstration: diet order  Physical Activity and Function: nutrition-related ADLs and IADLs  Anthropometric  Measurements: weight, weight change  Biochemical Data, Medical Tests and Procedures: electrolyte and renal panel, gastrointestinal profile, glucose/endocrine profile, inflammatory profile  Nutrition-Focused Physical Findings: overall appearance     Nutrition Follow-Up  RD Follow-up?: Yes

## 2018-11-12 NOTE — PROGRESS NOTES
Saw patient and his mother in the ICU.  Gave them My New Journey: Living Smart After My Liver Transplant.  Asked them to read the book in preparation for education.  Allowed time for questions and answers.

## 2018-11-12 NOTE — ASSESSMENT & PLAN NOTE
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   - s/p OHLTx 11/11/2018   - remains anuric   - Had intra-op SLED  - CVP 3-4  Plan:  - Will start SLED-RRT for metabolic clearance and volume management x 12 hrs   - -300 ml/hr as tolerated   - Remains anuric  - Strict I/O and chart  - Avoid nephrotoxic medications  - please keep Hb > 7 gm/dL or higher if symptomatic  - Medication doses adjusted to GFR

## 2018-11-12 NOTE — SUBJECTIVE & OBJECTIVE
Scheduled Meds:   albumin human 5%  25 g Intravenous Once    albumin human 5%  25 g Intravenous Once    ciprofloxacin  400 mg Intravenous Q12H    famotidine (PF)  20 mg Intravenous Daily    fluconazole (DIFLUCAN) IVPB  200 mg Intravenous Q24H    heparin (porcine)  5,000 Units Subcutaneous Q8H    methylPREDNISolone sodium succinate  100 mg Intravenous Q12H    Followed by    [START ON 11/13/2018] methylPREDNISolone sodium succinate  80 mg Intravenous Q12H    Followed by    [START ON 11/14/2018] methylPREDNISolone sodium succinate  60 mg Intravenous Q12H    Followed by    [START ON 11/15/2018] methylPREDNISolone sodium succinate  40 mg Intravenous Q12H    Followed by    [START ON 11/16/2018] methylPREDNISolone sodium succinate  20 mg Intravenous Q12H    Followed by    [START ON 11/17/2018] predniSONE  20 mg Oral Daily    mycophenolate mofetil  1,000 mg Oral BID    tacrolimus  1 mg Oral BID    [START ON 11/21/2018] valganciclovir 50 mg/ml  450 mg Per NG tube Daily     Continuous Infusions:   insulin (HUMAN R) infusion (adults) 0.7 Units/hr (11/12/18 1300)    norepinephrine bitartrate-D5W 0.02 mcg/kg/min (11/12/18 1300)    propofol 35 mcg/kg/min (11/12/18 1300)    vasopressin (PITRESSIN) infusion Stopped (11/11/18 1600)     PRN Meds:sodium chloride, sodium chloride, sodium chloride, dextrose 50%, dextrose 50%    Review of Systems  Objective:     Vital Signs (Most Recent):  Temp: 97.2 °F (36.2 °C) (11/12/18 1100)  Pulse: 66 (11/12/18 1315)  Resp: 16 (11/12/18 1315)  BP: 100/65 (11/12/18 1300)  SpO2: 100 % (11/12/18 1315) Vital Signs (24h Range):  Temp:  [96.5 °F (35.8 °C)-98.4 °F (36.9 °C)] 97.2 °F (36.2 °C)  Pulse:  [64-86] 66  Resp:  [2-20] 16  SpO2:  [100 %] 100 %  BP: ()/(43-71) 100/65  Arterial Line BP: ()/(35-66) 103/50     Weight: 92.3 kg (203 lb 7.8 oz)  Body mass index is 29.2 kg/m².    Intake/Output - Last 3 Shifts       11/10 0700 - 11/11 0659 11/11 0700 - 11/12 0659 11/12 0700 -  11/13 0659    P.O. 540      I.V. (mL/kg) 5100 (60.4) 8942 (96.9)     Blood 3956 7059 418    Other  250     NG/GT  35     Total Intake(mL/kg) 9596 (113.7) 98178 (176.4) 418 (4.5)    Urine (mL/kg/hr) 0 (0) 22 (0) 0 (0)    Emesis/NG output  300     Drains 2000 2462 635    Other  175     Stool 0      Blood 6000 9100     Total Output 8000 52469 635    Net +1596 +4227 -217           Urine Occurrence 1 x      Stool Occurrence 5 x            Physical Exam   Eyes:   +jaundice   Cardiovascular: Normal rate, regular rhythm and intact distal pulses.   Pulmonary/Chest:   Coarse BS b/l   Abdominal:   3 JOSE A drain with SS output  Chevron incision c/d/i   Musculoskeletal: He exhibits edema.   Neurological:   Intubated, sedated   Skin:   Diffuse jaundice       Laboratory:  Immunosuppressants         Stop Route Frequency     tacrolimus (PROGRAF) 1 mg/mL oral syringe      -- Oral 2 times daily     mycophenolate mofetil 200 mg/mL suspension 1,000 mg      -- Oral 2 times daily        Labs within the past 24 hours have been reviewed.    Diagnostic Results:  I have personally reviewed all pertinent imaging studies.

## 2018-11-12 NOTE — SUBJECTIVE & OBJECTIVE
Interval History:   Pt with decreased UOP overnight. Received albumin, lasix. Pt remains intubated, sedated. Pt on insulin, levo gtt.     Follow-up For: Procedure(s) (LRB):  TRANSPLANT, LIVER (N/A)    Post-Operative Day: Day of Surgery     Past Medical History:   Diagnosis Date    Liver cirrhosis, alcoholic 09/30/2018       Past Surgical History:   Procedure Laterality Date    EGD (ESOPHAGOGASTRODUODENOSCOPY) N/A 11/6/2018    Performed by Duarte Jules MD at [facility] ENDO (2ND FLR)    ESOPHAGOGASTRODUODENOSCOPY N/A 11/6/2018    Procedure: EGD (ESOPHAGOGASTRODUODENOSCOPY);  Surgeon: Duarte Jules MD;  Location: [facility] ENDO (2ND FLR);  Service: Endoscopy;  Laterality: N/A;    INSERTION OF TUNNELED CENTRAL VENOUS HEMODIALYSIS CATHETER N/A 11/7/2018    Procedure: INSERTION, CATHETER, CENTRAL VENOUS, HEMODIALYSIS, TUNNELED;  Surgeon: Brock Gonzalez MD;  Location: [facility] CATH LAB;  Service: Nephrology;  Laterality: N/A;    INSERTION, CATHETER, CENTRAL VENOUS, HEMODIALYSIS, TUNNELED N/A 11/7/2018    Performed by Brock Gonzalez MD at [facility] CATH LAB       Review of patient's allergies indicates:   Allergen Reactions    Cefepime Rash    Penicillins Nausea And Vomiting and Rash     Full body rash from cefepime as well       Family History     None        Tobacco Use    Smoking status: Never Smoker   Substance and Sexual Activity    Alcohol use: Not on file    Drug use: Not on file    Sexual activity: Not on file      Review of Systems   Unable to perform ROS: Intubated     Objective:     Vital Signs (Most Recent):  Temp: 96.5 °F (35.8 °C) (11/12/18 0800)  Pulse: 67 (11/12/18 0800)  Resp: 16 (11/12/18 0800)  BP: (!) 93/57 (11/12/18 0800)  SpO2: 100 % (11/12/18 0800) Vital Signs (24h Range):  Temp:  [96.5 °F (35.8 °C)-98.6 °F (37 °C)] 96.5 °F (35.8 °C)  Pulse:  [67-86] 67  Resp:  [3-18] 16  SpO2:  [96 %-100 %] 100 %  BP: (61-97)/(43-61) 93/57  Arterial Line BP: ()/(35-58) 102/46     Weight: 92.3 kg (203 lb  7.8 oz)  Body mass index is 29.2 kg/m².      Intake/Output Summary (Last 24 hours) at 11/12/2018 0809  Last data filed at 11/12/2018 0700  Gross per 24 hour   Intake 01522 ml   Output 7354 ml   Net 4772 ml       Physical Exam   Eyes:   +jaundice   Cardiovascular: Normal rate, regular rhythm and intact distal pulses.   Pulmonary/Chest:   Coarse BS b/l   Abdominal:   3 JOSE A drain with SS output  Chevron incision c/d/i   Musculoskeletal: He exhibits edema.   Neurological:   Intubated, sedated   Skin:   Diffuse jaundice       Vents:  Vent Mode: SIMV (11/12/18 0651)  Ventilator Initiated: Yes (11/11/18 1215)  Set Rate: 16 bmp (11/12/18 0651)  Vt Set: 500 mL (11/12/18 0651)  Pressure Support: 10 cmH20 (11/12/18 0651)  PEEP/CPAP: 5 cmH20 (11/12/18 0651)  Oxygen Concentration (%): 40 (11/12/18 0800)  Peak Airway Pressure: 22 cmH2O (11/12/18 0651)  Plateau Pressure: 0 cmH20 (11/12/18 0651)  Total Ve: 8.44 mL (11/12/18 0651)  F/VT Ratio<105 (RSBI): (!) 5.68 (11/12/18 0651)    Lines/Drains/Airways     Central Venous Catheter Line                 Percutaneous Central Line Insertion/Assessment - double lumen  right subclavian -- days         Tunneled Central Line Insertion/Assessment - Double Lumen  11/07/18 1350 right internal jugular 4 days         Percutaneous Central Line Insertion/Assessment - double lumen  11/11/18 0205 right femoral 1 day         Pulmonary Artery Catheter Assessment  11/11/18 0236 1 day          Drain                 Urethral Catheter 11/11/18 0246 Straight-tip;Non-latex 16 Fr. 1 day         Closed/Suction Drain 11/11/18 1124 Right Abdomen Bulb 19 Fr. less than 1 day         Closed/Suction Drain 11/11/18 1127 Right Abdomen Bulb 19 Fr. less than 1 day         Closed/Suction Drain 11/11/18 1128 Left Abdomen Bulb 19 Fr. less than 1 day         NG/OG Tube 11/11/18 0900 orogastric Center mouth less than 1 day          Airway                 Airway - Non-Surgical 11/11/18 0158 Endotracheal Tube 1 day           Arterial Line                 Arterial Line 11/11/18 0159 Left Radial 1 day         Arterial Line 11/11/18 0205 Right Femoral 1 day          Peripheral Intravenous Line                 Peripheral IV - Single Lumen 11/09/18 1700 Right Antecubital 2 days         Peripheral IV - Single Lumen 11/11/18 0212 Left Antecubital 1 day                Significant Labs:    CBC/Anemia Profile:  Recent Labs   Lab 11/11/18  1908 11/11/18  2359 11/12/18  0350   WBC 10.86 13.22*  13.22* 12.90*  12.90*  12.90*   HGB 8.3* 8.5*  8.5* 7.4*  7.4*  7.4*   HCT 24.3* 24.5*  24.5* 20.8*  20.8*  20.8*   PLT 84* 106*  106* 84*  84*  84*   MCV 95 95  95 92  92  92   RDW 15.3* 15.4*  15.4* 15.1*  15.1*  15.1*        Chemistries:  Recent Labs   Lab 11/10/18  2317  11/11/18  0802  11/11/18  1052 11/11/18  1210 11/11/18  1212  11/11/18  1908 11/11/18  2359 11/12/18  0351      < > 136   < > 138 139  --   --  139  --  138   K 5.1   < > 3.0*   < > 3.8 4.1  --   --  3.8  --  3.4*     --   --   --   --  107  --   --  106  --  104   CO2 24  --   --   --   --  23  --   --  22*  --  21*   BUN 39*  --   --   --   --  16  --   --  24*  --  29*   CREATININE 6.4*  --   --   --   --  2.2*  --   --  3.0*  --  3.4*   CALCIUM 8.9  --   --   --   --  9.5  --   --  8.7  --  8.1*   ALBUMIN 2.2*   < > 1.7*   < > 2.0* 2.0*  --   --   --   --  2.7*   PROT 5.2*  --   --   --   --  3.1*  --   --   --   --  3.7*   BILITOT 37.2*  --   --   --   --  11.3*  --   --   --   --  8.6*   ALKPHOS 228*  --   --   --   --  62  --   --   --   --  65   ALT 28  --  586*  --   --  466*  --   --   --   --  310*   AST 70*  --  597*  --   --  794*  --    < > 466* 351* 249*  249*   MG 1.5*   < > 1.6   < > 1.8  --  1.8  --   --   --  2.3   PHOS  --   --   --   --   --   --  4.9*  --   --   --  4.0    < > = values in this interval not displayed.       All pertinent labs within the past 24 hours have been reviewed.    Significant Imaging: I have reviewed all  pertinent imaging results/findings within the past 24 hours.

## 2018-11-12 NOTE — PROGRESS NOTES
Dr. Thomas notified of pt's drop in H&H from 8.5 & 24.5 to 7.4 & 20.8. MD also aware of CVP of 4 mmHg. No new orders received @ this time. Will continue to monitor closely.

## 2018-11-12 NOTE — CONSULTS
Nephrology was previously following will continue to follow post transplant.   Nikolay Nino MD  Nephrology Fellow   [phone]

## 2018-11-12 NOTE — ANESTHESIA RELEASE NOTE
"Anesthesia Release from PACU Note    Patient: Femi Enciso    Procedure(s) Performed: Procedure(s) (LRB):  TRANSPLANT, LIVER (N/A)    Anesthesia type: general    Post pain: Adequate analgesia    Post assessment: no apparent anesthetic complications    Last Vitals:   Visit Vitals  BP 93/61 (BP Location: Left arm, Patient Position: Lying)   Pulse 69   Temp 36.3 °C (97.3 °F) (Core ([place]))   Resp 16   Ht 5' 10" (1.778 m)   Wt 92.3 kg (203 lb 7.8 oz)   SpO2 100%   BMI 29.20 kg/m²       Post vital signs: stable    Level of consciousness: sedated    Nausea/Vomiting: no nausea/no vomiting    Complications: none    Airway Patency: patent    Respiratory: ETT    Cardiovascular: stable and blood pressure at baseline    Hydration: euvolemic  "

## 2018-11-12 NOTE — ASSESSMENT & PLAN NOTE
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 POD#1    Neuro  -wean sedation as tolerated   -PRN pain meds   -monitor neuro exam off sedation     CV  -wean pressor support to maintain systolic BP >100  -monitor on telemetry     Resp  -on SIMV mechanical ventilator   -If patient responds to blood transfusion appropriately will wean vent to extubation     Heme  -transfuse 2u pRBC   -f/u post-transfusion CBC     ID  -monitor fever and WBC     MSK  -turn patient q2  -OOBTC once extubated     FEN/GI  -replace lytes   -NPO while intubated     Endocrine  -insulin as needed. Monitor BG     dispo  -continue ICU care

## 2018-11-13 ENCOUNTER — TELEPHONE (OUTPATIENT)
Dept: TRANSPLANT | Facility: HOSPITAL | Age: 53
End: 2018-11-13

## 2018-11-13 PROBLEM — E66.3 OVERWEIGHT (BMI 25.0-29.9): Status: ACTIVE | Noted: 2018-11-13

## 2018-11-13 LAB
ALBUMIN SERPL BCP-MCNC: 2.2 G/DL
ALBUMIN SERPL BCP-MCNC: 2.2 G/DL
ALBUMIN SERPL BCP-MCNC: 2.6 G/DL
ALBUMIN SERPL BCP-MCNC: 2.7 G/DL
ALLENS TEST: ABNORMAL
ALP SERPL-CCNC: 85 U/L
ALT SERPL W/O P-5'-P-CCNC: 339 U/L
ANION GAP SERPL CALC-SCNC: 10 MMOL/L
ANION GAP SERPL CALC-SCNC: 8 MMOL/L
ANION GAP SERPL CALC-SCNC: 9 MMOL/L
ANION GAP SERPL CALC-SCNC: 9 MMOL/L
APTT BLDCRRT: 37.7 SEC
AST SERPL-CCNC: 163 U/L
BACTERIA UR CULT: NORMAL
BASOPHILS # BLD AUTO: 0.02 K/UL
BASOPHILS NFR BLD: 0.1 %
BILIRUB DIRECT SERPL-MCNC: 7.9 MG/DL
BILIRUB SERPL-MCNC: 10.8 MG/DL
BLD PROD TYP BPU: NORMAL
BLD PROD TYP BPU: NORMAL
BLOOD UNIT EXPIRATION DATE: NORMAL
BLOOD UNIT EXPIRATION DATE: NORMAL
BLOOD UNIT TYPE CODE: 6200
BLOOD UNIT TYPE CODE: 9500
BLOOD UNIT TYPE: NORMAL
BLOOD UNIT TYPE: NORMAL
BUN SERPL-MCNC: 13 MG/DL
BUN SERPL-MCNC: 13 MG/DL
BUN SERPL-MCNC: 24 MG/DL
BUN SERPL-MCNC: 35 MG/DL
CALCIUM SERPL-MCNC: 7.8 MG/DL
CALCIUM SERPL-MCNC: 7.8 MG/DL
CALCIUM SERPL-MCNC: 7.9 MG/DL
CALCIUM SERPL-MCNC: 8.4 MG/DL
CHLORIDE SERPL-SCNC: 102 MMOL/L
CHLORIDE SERPL-SCNC: 103 MMOL/L
CHLORIDE SERPL-SCNC: 103 MMOL/L
CHLORIDE SERPL-SCNC: 99 MMOL/L
CO2 SERPL-SCNC: 23 MMOL/L
CO2 SERPL-SCNC: 25 MMOL/L
CODING SYSTEM: NORMAL
CODING SYSTEM: NORMAL
CREAT SERPL-MCNC: 1.5 MG/DL
CREAT SERPL-MCNC: 1.5 MG/DL
CREAT SERPL-MCNC: 2.1 MG/DL
CREAT SERPL-MCNC: 2.7 MG/DL
DELSYS: ABNORMAL
DIFFERENTIAL METHOD: ABNORMAL
DISPENSE STATUS: NORMAL
DISPENSE STATUS: NORMAL
EOSINOPHIL # BLD AUTO: 0.1 K/UL
EOSINOPHIL NFR BLD: 0.8 %
ERYTHROCYTE [DISTWIDTH] IN BLOOD BY AUTOMATED COUNT: 14.9 %
EST. GFR  (AFRICAN AMERICAN): 29.8 ML/MIN/1.73 M^2
EST. GFR  (AFRICAN AMERICAN): 40.3 ML/MIN/1.73 M^2
EST. GFR  (AFRICAN AMERICAN): >60 ML/MIN/1.73 M^2
EST. GFR  (AFRICAN AMERICAN): >60 ML/MIN/1.73 M^2
EST. GFR  (NON AFRICAN AMERICAN): 25.7 ML/MIN/1.73 M^2
EST. GFR  (NON AFRICAN AMERICAN): 34.9 ML/MIN/1.73 M^2
EST. GFR  (NON AFRICAN AMERICAN): 52.4 ML/MIN/1.73 M^2
EST. GFR  (NON AFRICAN AMERICAN): 52.4 ML/MIN/1.73 M^2
FIO2: 21
GGT SERPL-CCNC: 138 U/L
GLUCOSE SERPL-MCNC: 160 MG/DL
GLUCOSE SERPL-MCNC: 160 MG/DL
GLUCOSE SERPL-MCNC: 220 MG/DL
GLUCOSE SERPL-MCNC: 294 MG/DL
HCO3 UR-SCNC: 26.4 MMOL/L (ref 24–28)
HCT VFR BLD AUTO: 23.3 %
HGB BLD-MCNC: 8 G/DL
IMM GRANULOCYTES # BLD AUTO: 0.23 K/UL
IMM GRANULOCYTES NFR BLD AUTO: 1.7 %
INR PPP: 1.5
LYMPHOCYTES # BLD AUTO: 1.3 K/UL
LYMPHOCYTES NFR BLD: 9.2 %
MAGNESIUM SERPL-MCNC: 1.6 MG/DL
MAGNESIUM SERPL-MCNC: 2.3 MG/DL
MAGNESIUM SERPL-MCNC: 2.5 MG/DL
MCH RBC QN AUTO: 31 PG
MCHC RBC AUTO-ENTMCNC: 34.3 G/DL
MCV RBC AUTO: 90 FL
MODE: ABNORMAL
MONOCYTES # BLD AUTO: 1.1 K/UL
MONOCYTES NFR BLD: 8 %
NEUTROPHILS # BLD AUTO: 10.9 K/UL
NEUTROPHILS NFR BLD: 80.2 %
NRBC BLD-RTO: 0 /100 WBC
PCO2 BLDA: 34.5 MMHG (ref 35–45)
PH SMN: 7.49 [PH] (ref 7.35–7.45)
PHOSPHATE SERPL-MCNC: 3.2 MG/DL
PHOSPHATE SERPL-MCNC: 4.2 MG/DL
PHOSPHATE SERPL-MCNC: 4.4 MG/DL
PLATELET # BLD AUTO: 59 K/UL
PMV BLD AUTO: 11.4 FL
PO2 BLDA: 61 MMHG (ref 80–100)
POC BE: 3 MMOL/L
POC SATURATED O2: 93 % (ref 95–100)
POC TCO2: 27 MMOL/L (ref 23–27)
POCT GLUCOSE: 162 MG/DL (ref 70–110)
POCT GLUCOSE: 169 MG/DL (ref 70–110)
POCT GLUCOSE: 173 MG/DL (ref 70–110)
POCT GLUCOSE: 202 MG/DL (ref 70–110)
POCT GLUCOSE: 205 MG/DL (ref 70–110)
POCT GLUCOSE: 207 MG/DL (ref 70–110)
POCT GLUCOSE: 281 MG/DL (ref 70–110)
POCT GLUCOSE: 284 MG/DL (ref 70–110)
POTASSIUM SERPL-SCNC: 4.5 MMOL/L
POTASSIUM SERPL-SCNC: 4.5 MMOL/L
POTASSIUM SERPL-SCNC: 4.6 MMOL/L
POTASSIUM SERPL-SCNC: 4.7 MMOL/L
PROT SERPL-MCNC: 3.7 G/DL
PROTHROMBIN TIME: 14.9 SEC
RBC # BLD AUTO: 2.58 M/UL
SAMPLE: ABNORMAL
SITE: ABNORMAL
SODIUM SERPL-SCNC: 132 MMOL/L
SODIUM SERPL-SCNC: 135 MMOL/L
SODIUM SERPL-SCNC: 137 MMOL/L
SODIUM SERPL-SCNC: 137 MMOL/L
TACROLIMUS BLD-MCNC: 8.9 NG/ML
TRANS PLATPHERESIS VOL PATIENT: NORMAL ML
TRANS PLATPHERESIS VOL PATIENT: NORMAL ML
WBC # BLD AUTO: 13.55 K/UL

## 2018-11-13 PROCEDURE — 37799 UNLISTED PX VASCULAR SURGERY: CPT

## 2018-11-13 PROCEDURE — 80197 ASSAY OF TACROLIMUS: CPT

## 2018-11-13 PROCEDURE — 63600175 PHARM REV CODE 636 W HCPCS: Mod: JG | Performed by: STUDENT IN AN ORGANIZED HEALTH CARE EDUCATION/TRAINING PROGRAM

## 2018-11-13 PROCEDURE — 25000003 PHARM REV CODE 250: Performed by: NURSE PRACTITIONER

## 2018-11-13 PROCEDURE — 63600175 PHARM REV CODE 636 W HCPCS: Performed by: TRANSPLANT SURGERY

## 2018-11-13 PROCEDURE — 85025 COMPLETE CBC W/AUTO DIFF WBC: CPT

## 2018-11-13 PROCEDURE — 99232 SBSQ HOSP IP/OBS MODERATE 35: CPT | Mod: 24,,, | Performed by: SURGERY

## 2018-11-13 PROCEDURE — 99232 SBSQ HOSP IP/OBS MODERATE 35: CPT | Mod: ,,, | Performed by: NURSE PRACTITIONER

## 2018-11-13 PROCEDURE — 97164 PT RE-EVAL EST PLAN CARE: CPT

## 2018-11-13 PROCEDURE — 63600175 PHARM REV CODE 636 W HCPCS: Performed by: HOSPITALIST

## 2018-11-13 PROCEDURE — 85730 THROMBOPLASTIN TIME PARTIAL: CPT

## 2018-11-13 PROCEDURE — 82977 ASSAY OF GGT: CPT

## 2018-11-13 PROCEDURE — P9035 PLATELET PHERES LEUKOREDUCED: HCPCS

## 2018-11-13 PROCEDURE — 80053 COMPREHEN METABOLIC PANEL: CPT

## 2018-11-13 PROCEDURE — 94799 UNLISTED PULMONARY SVC/PX: CPT

## 2018-11-13 PROCEDURE — P9047 ALBUMIN (HUMAN), 25%, 50ML: HCPCS | Mod: JG | Performed by: STUDENT IN AN ORGANIZED HEALTH CARE EDUCATION/TRAINING PROGRAM

## 2018-11-13 PROCEDURE — 99900035 HC TECH TIME PER 15 MIN (STAT)

## 2018-11-13 PROCEDURE — 83735 ASSAY OF MAGNESIUM: CPT

## 2018-11-13 PROCEDURE — 99232 PR SUBSEQUENT HOSPITAL CARE,LEVL II: ICD-10-PCS | Mod: ,,, | Performed by: NURSE PRACTITIONER

## 2018-11-13 PROCEDURE — 25000003 PHARM REV CODE 250: Performed by: TRANSPLANT SURGERY

## 2018-11-13 PROCEDURE — 36430 TRANSFUSION BLD/BLD COMPNT: CPT

## 2018-11-13 PROCEDURE — 85610 PROTHROMBIN TIME: CPT

## 2018-11-13 PROCEDURE — 99232 PR SUBSEQUENT HOSPITAL CARE,LEVL II: ICD-10-PCS | Mod: 24,,, | Performed by: SURGERY

## 2018-11-13 PROCEDURE — 82248 BILIRUBIN DIRECT: CPT

## 2018-11-13 PROCEDURE — 99232 PR SUBSEQUENT HOSPITAL CARE,LEVL II: ICD-10-PCS | Mod: ,,, | Performed by: INTERNAL MEDICINE

## 2018-11-13 PROCEDURE — 99232 SBSQ HOSP IP/OBS MODERATE 35: CPT | Mod: ,,, | Performed by: INTERNAL MEDICINE

## 2018-11-13 PROCEDURE — 63600175 PHARM REV CODE 636 W HCPCS: Performed by: STUDENT IN AN ORGANIZED HEALTH CARE EDUCATION/TRAINING PROGRAM

## 2018-11-13 PROCEDURE — 25000003 PHARM REV CODE 250: Performed by: STUDENT IN AN ORGANIZED HEALTH CARE EDUCATION/TRAINING PROGRAM

## 2018-11-13 PROCEDURE — 80069 RENAL FUNCTION PANEL: CPT | Mod: 91

## 2018-11-13 PROCEDURE — S0028 INJECTION, FAMOTIDINE, 20 MG: HCPCS | Performed by: TRANSPLANT SURGERY

## 2018-11-13 PROCEDURE — 94761 N-INVAS EAR/PLS OXIMETRY MLT: CPT

## 2018-11-13 PROCEDURE — 20000000 HC ICU ROOM

## 2018-11-13 PROCEDURE — 63600175 PHARM REV CODE 636 W HCPCS: Performed by: NURSE PRACTITIONER

## 2018-11-13 RX ORDER — OXYCODONE HYDROCHLORIDE 10 MG/1
10 TABLET ORAL EVERY 4 HOURS PRN
Status: DISCONTINUED | OUTPATIENT
Start: 2018-11-13 | End: 2018-11-18

## 2018-11-13 RX ORDER — HYDROCODONE BITARTRATE AND ACETAMINOPHEN 500; 5 MG/1; MG/1
TABLET ORAL
Status: DISCONTINUED | OUTPATIENT
Start: 2018-11-13 | End: 2018-11-16

## 2018-11-13 RX ORDER — GLUCAGON 1 MG
1 KIT INJECTION
Status: DISCONTINUED | OUTPATIENT
Start: 2018-11-13 | End: 2018-11-14

## 2018-11-13 RX ORDER — IBUPROFEN 200 MG
16 TABLET ORAL
Status: DISCONTINUED | OUTPATIENT
Start: 2018-11-13 | End: 2018-11-14

## 2018-11-13 RX ORDER — ALBUMIN HUMAN 250 G/1000ML
25 SOLUTION INTRAVENOUS ONCE
Status: COMPLETED | OUTPATIENT
Start: 2018-11-13 | End: 2018-11-13

## 2018-11-13 RX ORDER — IBUPROFEN 200 MG
24 TABLET ORAL
Status: DISCONTINUED | OUTPATIENT
Start: 2018-11-13 | End: 2018-11-14

## 2018-11-13 RX ORDER — INSULIN ASPART 100 [IU]/ML
0-4 INJECTION, SOLUTION INTRAVENOUS; SUBCUTANEOUS
Status: DISCONTINUED | OUTPATIENT
Start: 2018-11-13 | End: 2018-11-14

## 2018-11-13 RX ORDER — INSULIN ASPART 100 [IU]/ML
2 INJECTION, SOLUTION INTRAVENOUS; SUBCUTANEOUS
Status: DISCONTINUED | OUTPATIENT
Start: 2018-11-13 | End: 2018-11-14

## 2018-11-13 RX ORDER — FUROSEMIDE 10 MG/ML
40 INJECTION INTRAMUSCULAR; INTRAVENOUS 2 TIMES DAILY
Status: DISCONTINUED | OUTPATIENT
Start: 2018-11-13 | End: 2018-11-14

## 2018-11-13 RX ORDER — OXYCODONE HYDROCHLORIDE 5 MG/1
5 TABLET ORAL EVERY 6 HOURS PRN
Status: DISCONTINUED | OUTPATIENT
Start: 2018-11-13 | End: 2018-11-18

## 2018-11-13 RX ORDER — RAMELTEON 8 MG/1
8 TABLET ORAL NIGHTLY PRN
Status: DISCONTINUED | OUTPATIENT
Start: 2018-11-13 | End: 2018-11-29

## 2018-11-13 RX ADMIN — RAMELTEON 8 MG: 8 TABLET, FILM COATED ORAL at 01:11

## 2018-11-13 RX ADMIN — METHYLPREDNISOLONE SODIUM SUCCINATE 80 MG: 125 INJECTION, POWDER, FOR SOLUTION INTRAMUSCULAR; INTRAVENOUS at 08:11

## 2018-11-13 RX ADMIN — CIPROFLOXACIN 400 MG: 2 INJECTION, SOLUTION INTRAVENOUS at 05:11

## 2018-11-13 RX ADMIN — Medication 1000 MG: at 10:11

## 2018-11-13 RX ADMIN — INSULIN ASPART 1 UNITS: 100 INJECTION, SOLUTION INTRAVENOUS; SUBCUTANEOUS at 01:11

## 2018-11-13 RX ADMIN — FUROSEMIDE 40 MG: 10 INJECTION, SOLUTION INTRAMUSCULAR; INTRAVENOUS at 11:11

## 2018-11-13 RX ADMIN — FAMOTIDINE 20 MG: 10 INJECTION, SOLUTION INTRAVENOUS at 08:11

## 2018-11-13 RX ADMIN — FLUCONAZOLE IN SODIUM CHLORIDE 200 MG: 2 INJECTION, SOLUTION INTRAVENOUS at 03:11

## 2018-11-13 RX ADMIN — INSULIN ASPART 2 UNITS: 100 INJECTION, SOLUTION INTRAVENOUS; SUBCUTANEOUS at 06:11

## 2018-11-13 RX ADMIN — FUROSEMIDE 40 MG: 10 INJECTION, SOLUTION INTRAMUSCULAR; INTRAVENOUS at 05:11

## 2018-11-13 RX ADMIN — OXYCODONE HYDROCHLORIDE 10 MG: 10 TABLET ORAL at 08:11

## 2018-11-13 RX ADMIN — RAMELTEON 8 MG: 8 TABLET, FILM COATED ORAL at 08:11

## 2018-11-13 RX ADMIN — Medication 2 MG: at 08:11

## 2018-11-13 RX ADMIN — HEPARIN SODIUM 5000 UNITS: 5000 INJECTION, SOLUTION INTRAVENOUS; SUBCUTANEOUS at 06:11

## 2018-11-13 RX ADMIN — Medication 2 MG: at 06:11

## 2018-11-13 RX ADMIN — MAGNESIUM SULFATE IN WATER 2 G: 40 INJECTION, SOLUTION INTRAVENOUS at 06:11

## 2018-11-13 RX ADMIN — ALBUMIN HUMAN 25 G: 0.25 SOLUTION INTRAVENOUS at 12:11

## 2018-11-13 RX ADMIN — INSULIN ASPART 3 UNITS: 100 INJECTION, SOLUTION INTRAVENOUS; SUBCUTANEOUS at 06:11

## 2018-11-13 RX ADMIN — FENTANYL CITRATE 25 MCG: 50 INJECTION INTRAMUSCULAR; INTRAVENOUS at 02:11

## 2018-11-13 RX ADMIN — HEPARIN SODIUM 5000 UNITS: 5000 INJECTION, SOLUTION INTRAVENOUS; SUBCUTANEOUS at 02:11

## 2018-11-13 RX ADMIN — Medication 0.02 MCG/KG/MIN: at 05:11

## 2018-11-13 NOTE — ASSESSMENT & PLAN NOTE
Will clarify if patient with DM once extubated or family at bedside.   BG goal 140-180.       Continue IV insulin protocol; start if needed.   Requires q1 hr BG monitoring.     ADDENDUM: change to q2 hr bg monitoring.

## 2018-11-13 NOTE — PROGRESS NOTES
"[facility]  Critical Care - Surgery  Progress Note    Patient Name: Femi Enciso  MRN: [id number]  Admission Date: 10/27/2018  Hospital Length of Stay: 17 days  Code Status: Full Code  Attending Provider: Femi Patel MD  Primary Care Provider: Primary Doctor No   Principal Problem: S/P liver transplant    Subjective:     Hospital/ICU Course:  11/11: s/p liver transplant  11/12 - extubated, weaned off pressors; pulled out all 3 JOSE A drains    Interval History/Significant Events: Patient extubated yesterday, now on 2L NC. He was weaned off pressors. This AM he pulled out all 3 of his JOSE A drains - patient seems unclear of why he did this, says "I thought I had another surgery today." He is fully oriented x 3. CRRT done overnight, patient net -190 cc over 24h. He received 2U pRBC yesterday AM, Hgb increased 7.4 -> 8.7, now 8.0.     Follow-up For: Procedure(s) (LRB):  TRANSPLANT, LIVER (N/A)    Post-Operative Day: 2 Days Post-Op    Objective:     Vital Signs (Most Recent):  Temp: 97.8 °F (36.6 °C) (11/13/18 0700)  Pulse: 90 (11/13/18 0830)  Resp: 20 (11/13/18 0830)  BP: 104/70 (11/13/18 0830)  SpO2: 99 % (11/13/18 0830) Vital Signs (24h Range):  Temp:  [96.8 °F (36 °C)-98.1 °F (36.7 °C)] 97.8 °F (36.6 °C)  Pulse:  [] 90  Resp:  [2-26] 20  SpO2:  [97 %-100 %] 99 %  BP: ()/(52-71) 104/70  Arterial Line BP: ()/(43-66) 109/53     Weight: 90 kg (198 lb 6.6 oz)  Body mass index is 28.47 kg/m².      Intake/Output Summary (Last 24 hours) at 11/13/2018 0840  Last data filed at 11/13/2018 0700  Gross per 24 hour   Intake 4533.77 ml   Output 4803 ml   Net -269.23 ml       Physical Exam   Constitutional: He is oriented to person, place, and time. He appears well-developed.   jaundiced   HENT:   Head: Normocephalic and atraumatic.   Eyes: Scleral icterus is present.   Cardiovascular: Normal rate, regular rhythm and intact distal pulses.   Pulmonary/Chest: Effort normal. No respiratory distress. "   Coarse BS b/l   Abdominal: Soft. He exhibits no distension.   Chevron incision c/d/i  Dressing over former site of right drain; ostomy bag over former left sided drain sites, collecting SS output   Musculoskeletal: He exhibits edema.   Neurological: He is alert and oriented to person, place, and time.   Skin: Skin is warm and dry.   Diffuse jaundice       Lines/Drains/Airways     Central Venous Catheter Line                 Percutaneous Central Line Insertion/Assessment - double lumen  right subclavian -- days         Tunneled Central Line Insertion/Assessment - Double Lumen  11/07/18 1350 right internal jugular 5 days         Percutaneous Central Line Insertion/Assessment - double lumen  11/11/18 0205 right femoral 2 days          Drain                 Urethral Catheter 11/11/18 0246 Straight-tip;Non-latex 16 Fr. 2 days          Arterial Line                 Arterial Line 11/11/18 0159 Left Radial 2 days         Arterial Line 11/11/18 0205 Right Femoral 2 days          Peripheral Intravenous Line                 Peripheral IV - Single Lumen 11/09/18 1700 Right Antecubital 3 days         Peripheral IV - Single Lumen 11/11/18 0212 Left Antecubital 2 days                Significant Labs:    CBC/Anemia Profile:  Recent Labs   Lab 11/12/18  1452 11/12/18  2006 11/13/18  0406   WBC 10.37 15.22* 13.55*   HGB 8.2* 8.7* 8.0*   HCT 23.1* 24.3* 23.3*   PLT 50* 63* 59*   MCV 90 89 90   RDW 14.6* 15.0* 14.9*        Chemistries:  Recent Labs   Lab 11/11/18  1210  11/12/18  0351 11/12/18  0808 11/12/18  1157 11/12/18  2203 11/13/18  0406      < > 138 137  --  136 137  137   K 4.1   < > 3.4* 4.1  --  4.4 4.5  4.5      < > 104 105  --  103 103  103   CO2 23   < > 21* 22*  --  25 25  25   BUN 16   < > 29* 31*  --  15 13  13   CREATININE 2.2*   < > 3.4* 3.6*  --  1.8* 1.5*  1.5*   CALCIUM 9.5   < > 8.1* 8.4*  --  7.7* 7.8*  7.8*   ALBUMIN 2.0*  --  2.7*  --   --  2.4* 2.2*  2.2*   PROT 3.1*  --  3.7*  --   --    --  3.7*   BILITOT 11.3*  --  8.6*  --   --   --  10.8*   ALKPHOS 62  --  65  --   --   --  85   *  --  310*  --   --   --  339*   *   < > 249*  249* 211* 194*  --  163*   MG  --    < > 2.3  --   --  1.9 1.6   PHOS  --    < > 4.0  --   --  3.3 3.2    < > = values in this interval not displayed.       Significant Imaging:  I have reviewed and interpreted all pertinent imaging results/findings within the past 24 hours.    Assessment/Plan:     * S/P liver transplant    S/P liver transplant     52 y/o man with ESLD, ESRD and DM2 now s/p liver transplant     Neuro:   - Sedation: none  - Pain control: PRN fentanyl     Pulmonary:   - 2L NC; wean to RA as tolerated  - daily cxr  - duonebs prn     Cardiac:  - Drips: none  - HDS  - mgmt per transplant     Renal:   -CRRT intraop and overnight  -trend BMP  - BUN/Cr: 13/1.5     Infectious Disease:   - AF  - Trend WBC - 12 -> 13  - Abx: Cipro (UCx 11/11 grew Enterococcus)  - Prophylaxis per transplant - Valcyte, Diflucan  - IS per Transplant - MMF, Prograf     Hematology/Oncology:  - H&H 7.4/20.8 -> 8.0/23 after 2U pRBCs  - Plt 84 -> 59  - INR 1.7 -> 1.5  - Trend CBC     Endocrine:  - q2h accuchecks - 150s-170s  - insulin gtt     Fluids/Electrolytes/Nutrition/GI:   - NPO; ADAT per Transplant  - Trend CMP  - Replace Lytes PRN  - liver US 11/12 - 6.6 cm complex fluid collection deep to left lobe; increased flow velocity in main hepatic artery      Prophylaxis:  - SQH  - Famotidine     Dispo:  Continue ICU care  Primary team:Transplant              Critical care was time spent personally by me on the following activities: development of treatment plan with patient or surrogate and bedside caregivers, discussions with consultants, evaluation of patient's response to treatment, examination of patient, ordering and performing treatments and interventions, ordering and review of laboratory studies, ordering and review of radiographic studies, pulse oximetry, re-evaluation  of patient's condition.  This critical care time did not overlap with that of any other provider or involve time for any procedures.     Miguel Ahmadi MD  Critical Care - Surgery  [facility]

## 2018-11-13 NOTE — SUBJECTIVE & OBJECTIVE
"Interval HPI:   Overnight events:  Remains in ICU, intubated. Plans to extubate per RN at bedside. CRRT. BG at or above goal on insulin infusion rates 1.1-13.5 overnight. BG and insulin requirements trending down today. Solumedrol 100 mg BID; standard steroid taper.   Eating:   NPO  Hypoglycemia and intervention: No  Fever: No  TPN and/or TF: No    BP (!) 82/52   Pulse 92   Temp 97.3 °F (36.3 °C) (Core ([place]))   Resp (!) 23   Ht 5' 10" (1.778 m)   Wt 92.3 kg (203 lb 7.8 oz)   SpO2 100%   BMI 29.20 kg/m²     Labs Reviewed and Include    Recent Labs   Lab 11/12/18  0351 11/12/18  0808 11/12/18  1157   * 126*  --    CALCIUM 8.1* 8.4*  --    ALBUMIN 2.7*  --   --    PROT 3.7*  --   --     137  --    K 3.4* 4.1  --    CO2 21* 22*  --     105  --    BUN 29* 31*  --    CREATININE 3.4* 3.6*  --    ALKPHOS 65  --   --    *  --   --    *  249* 211* 194*   BILITOT 8.6*  --   --      Lab Results   Component Value Date    WBC 10.37 11/12/2018    HGB 8.2 (L) 11/12/2018    HCT 23.1 (L) 11/12/2018    MCV 90 11/12/2018    PLT 50 (L) 11/12/2018     No results for input(s): TSH, FREET4 in the last 168 hours.  Lab Results   Component Value Date    HGBA1C 4.4 11/09/2018       Nutritional status:   Body mass index is 29.2 kg/m².  Lab Results   Component Value Date    ALBUMIN 2.7 (L) 11/12/2018    ALBUMIN 2.0 (L) 11/11/2018    ALBUMIN 2.0 (L) 11/11/2018     Lab Results   Component Value Date    PREALBUMIN 7 (L) 10/27/2018       Estimated Creatinine Clearance: 27.1 mL/min (A) (based on SCr of 3.6 mg/dL (H)).    Accu-Checks  Recent Labs     11/12/18  0511 11/12/18  0556 11/12/18  0717 11/12/18  0811 11/12/18  0903 11/12/18  1013 11/12/18  1110 11/12/18  1155 11/12/18  1315 11/12/18  1455   POCTGLUCOSE 164* 178* 138* 129* 114* 131* 140* 150* 147* 172*       Current Medications and/or Treatments Impacting Glycemic Control  Immunotherapy:    Immunosuppressants         Stop Route Frequency     " tacrolimus (PROGRAF) 1 mg/mL oral syringe      -- Oral 2 times daily     mycophenolate mofetil 200 mg/mL suspension 1,000 mg      -- Oral 2 times daily        Steroids:   Hormones (From admission, onward)    Start     Stop Route Frequency Ordered    11/17/18 0900  predniSONE tablet 20 mg  (methylprednisolone taper panel)      -- Oral Daily 11/11/18 1208    11/16/18 0900  methylPREDNISolone sodium succinate injection 20 mg  (methylprednisolone taper panel)      11/17 0859 IV Every 12 hours 11/11/18 1208    11/15/18 0900  methylPREDNISolone sodium succinate injection 40 mg  (methylprednisolone taper panel)      11/16 0859 IV Every 12 hours 11/11/18 1208    11/14/18 0900  methylPREDNISolone sodium succinate injection 60 mg  (methylprednisolone taper panel)      11/15 0859 IV Every 12 hours 11/11/18 1208    11/13/18 0900  methylPREDNISolone sodium succinate injection 80 mg  (methylprednisolone taper panel)      11/14 0859 IV Every 12 hours 11/11/18 1208    11/12/18 0900  methylPREDNISolone sodium succinate injection 100 mg  (methylprednisolone taper panel)      11/13 0859 IV Every 12 hours 11/11/18 1208    11/11/18 1445  vasopressin (PITRESSIN) 0.2 Units/mL in dextrose 5 % 100 mL infusion      -- IV Continuous 11/11/18 1337    11/09/18 1345  methylPREDNISolone sodium succinate injection 500 mg  (Med - Immunosuppression Induction Therapy (Methylprednisolone))      11/10 0144 IV Once pre-op 11/09/18 1236        Pressors:    Autonomic Drugs (From admission, onward)    Start     Stop Route Frequency Ordered    11/11/18 1335  norepinephrine 4 mg in dextrose 5% 250 mL infusion (premix) (titrating)     Question Answer Comment   Titrate by: (in mcg/kg/min) 0.01    Titrate interval: (in minutes) 2    Titrate to maintain: (MAP or SBP) MAP    Greater than: (in mmHg) 65    Maximum dose: (in mcg/kg/min) 2        -- IV Continuous 11/11/18 1337        Hyperglycemia/Diabetes Medications:   Antihyperglycemics (From admission, onward)     Start     Stop Route Frequency Ordered    11/11/18 1415  insulin regular (Humulin R) 100 Units in sodium chloride 0.9% 100 mL infusion     Question:  Insulin Rate Adjustment (DO NOT MODIFY ANSWER)  Answer:  [email or web address]    -- IV Continuous 11/11/18 1319

## 2018-11-13 NOTE — SUBJECTIVE & OBJECTIVE
Interval History:   NAEON, tolerated SLED overnight. This am he feels better extubated yesterday. MS improved AAOx4 . CVP down to 1 post SLED and after 2 piggy bags CVP 5  Hb stable this am no plans for transfusion. Remains anuric. Off pressors. FLuid balance is neg 269 ml. Drains output overnight 1.6 lts.   Review of patient's allergies indicates:   Allergen Reactions    Penicillins Nausea And Vomiting and Rash     Current Facility-Administered Medications   Medication Frequency    0.9%  NaCl infusion (for blood administration) Q24H PRN    albumin human 5% bottle 25 g Once    albumin human 5% bottle 25 g Once    ciprofloxacin (CIPRO)400mg/200ml D5W IVPB 400 mg Q12H    dextrose 50% injection 12.5 g PRN    dextrose 50% injection 25 g PRN    famotidine (PF) injection 20 mg Daily    fluconazole (DIFLUCAN) IVPB 200 mg 100 mL Q24H    furosemide injection 40 mg BID    glucagon (human recombinant) injection 1 mg PRN    glucose chewable tablet 16 g PRN    glucose chewable tablet 24 g PRN    heparin (porcine) injection 5,000 Units Q8H    insulin aspart U-100 pen 0-4 Units PRN    insulin aspart U-100 pen 2 Units TIDWM    insulin regular (Humulin R) 100 Units in sodium chloride 0.9% 100 mL infusion Continuous    methylPREDNISolone sodium succinate injection 80 mg Q12H    Followed by    [START ON 11/14/2018] methylPREDNISolone sodium succinate injection 60 mg Q12H    Followed by    [START ON 11/15/2018] methylPREDNISolone sodium succinate injection 40 mg Q12H    Followed by    [START ON 11/16/2018] methylPREDNISolone sodium succinate injection 20 mg Q12H    Followed by    [START ON 11/17/2018] predniSONE tablet 20 mg Daily    mycophenolate mofetil 200 mg/mL suspension 1,000 mg BID    norepinephrine 4 mg in dextrose 5% 250 mL infusion (premix) (titrating) Continuous    oxyCODONE immediate release tablet 5 mg Q6H PRN    oxyCODONE immediate release tablet Tab 10 mg Q4H PRN    propofol (DIPRIVAN) 10 mg/mL  infusion Continuous    ramelteon tablet 8 mg Nightly PRN    tacrolimus (PROGRAF) 1 mg/mL oral syringe BID    [START ON 11/21/2018] valganciclovir 50 mg/ml oral solution 450 mg Daily    vasopressin (PITRESSIN) 0.2 Units/mL in dextrose 5 % 100 mL infusion Continuous       Objective:     Vital Signs (Most Recent):  Temp: 97.5 °F (36.4 °C) (11/13/18 1500)  Pulse: 89 (11/13/18 1600)  Resp: 14 (11/13/18 1600)  BP: 105/64 (11/13/18 1500)  SpO2: 99 % (11/13/18 1600)  O2 Device (Oxygen Therapy): nasal cannula (11/13/18 0600) Vital Signs (24h Range):  Temp:  [97.5 °F (36.4 °C)-98.8 °F (37.1 °C)] 97.5 °F (36.4 °C)  Pulse:  [] 89  Resp:  [7-26] 14  SpO2:  [96 %-100 %] 99 %  BP: ()/(52-70) 105/64  Arterial Line BP: ()/(43-62) 102/47     Weight: 90 kg (198 lb 6.6 oz) (11/13/18 0629)  Body mass index is 28.47 kg/m².  Body surface area is 2.11 meters squared.    I/O last 3 completed shifts:  In: 6182.8 [I.V.:5729.8; Blood:418; NG/GT:35]  Out: 6160 [Urine:15; Drains:2981; Other:3164]    Physical Exam   Constitutional: He appears well-developed. He appears cachectic. He is cooperative. No distress. Nasal cannula in place.   AAOx3   HENT:   Head: Normocephalic and atraumatic.   Eyes: Conjunctivae are normal. Pupils are equal, round, and reactive to light.   Neck: Trachea normal. Neck supple. No JVD present.   Cardiovascular: Normal rate, regular rhythm, S1 normal, S2 normal and normal pulses. Exam reveals no gallop and no friction rub.   No murmur heard.  Pulmonary/Chest: Effort normal. He has decreased breath sounds in the right lower field and the left lower field. He has no rhonchi.   Abdominal: Soft. He exhibits distension. He exhibits no ascites. Bowel sounds are decreased. There is no tenderness.       Musculoskeletal: Normal range of motion. He exhibits edema (edema+ trace pitting sacral).   Neurological:   Awake alert and Oriented x 3   Skin: Skin is warm and dry. Capillary refill takes less than 2  seconds.   Psychiatric: He has a normal mood and affect. His behavior is normal.   Vitals reviewed.      Significant Labs:  ABGs:   Recent Labs   Lab 11/13/18  0453   PH 7.492*   PCO2 34.5*   HCO3 26.4   POCSATURATED 93*   BE 3     BMP:   Recent Labs   Lab 11/13/18  1425   *      CO2 23   BUN 24*   CREATININE 2.1*   CALCIUM 7.9*   MG 2.3     Cardiac Markers: No results for input(s): CKMB, TROPONINT, MYOGLOBIN in the last 168 hours.  CBC:   Recent Labs   Lab 11/13/18  0406   WBC 13.55*   RBC 2.58*   HGB 8.0*   HCT 23.3*   PLT 59*   MCV 90   MCH 31.0   MCHC 34.3     CMP:   Recent Labs   Lab 11/13/18  0406 11/13/18  1425   *  160* 220*   CALCIUM 7.8*  7.8* 7.9*   ALBUMIN 2.2*  2.2* 2.6*   PROT 3.7*  --      137 135*   K 4.5  4.5 4.7   CO2 25  25 23     103 102   BUN 13  13 24*   CREATININE 1.5*  1.5* 2.1*   ALKPHOS 85  --    *  --    *  --    BILITOT 10.8*  --      Coagulation:   Recent Labs   Lab 11/13/18  0406   INR 1.5*   APTT 37.7*     Recent Labs   Lab 11/09/18  1737   COLORU Green*   SPECGRAV 1.025   PHUR 6.5   PROTEINUA 2+*   BACTERIA Occasional   NITRITE Negative   LEUKOCYTESUR Trace*   HYALINECASTS 0     All labs within the past 24 hours have been reviewed.     Significant Imaging:  Labs: Reviewed  US: Reviewed

## 2018-11-13 NOTE — ASSESSMENT & PLAN NOTE
Avoid insulin stacking and hypoglycemia.  Lab Results   Component Value Date    CREATININE 3.6 (H) 11/12/2018

## 2018-11-13 NOTE — PROGRESS NOTES
"[facility]  Endocrinology  Progress Note    Admit Date: 10/27/2018     Reason for Consult: Management of Hyperglycemia     Surgical Procedure and Date: liver transplant 11/11/18    HPI:   Patient is a 53 y.o. male with a diagnosis of ESLD secondary to ETOH abuse and DEL with ESRD with RRT. Patient is now s/p liver transplant. Endocrinology consulted for BG management.        Unclear if patient has DM; mentioned briefly in chart but not under history. No DM meds on file and A1C normal. Patient intubated and no family at bedside.  Lab Results   Component Value Date    HGBA1C 4.4 11/09/2018             Interval HPI:   Overnight events:  Remains in ICU, intubated. Plans to extubate per RN at bedside. CRRT. BG at or above goal on insulin infusion rates 1.1-13.5 overnight. BG and insulin requirements trending down today. Solumedrol 100 mg BID; standard steroid taper.   Eating:   NPO  Hypoglycemia and intervention: No  Fever: No  TPN and/or TF: No    BP (!) 82/52   Pulse 92   Temp 97.3 °F (36.3 °C) (Core ([place]))   Resp (!) 23   Ht 5' 10" (1.778 m)   Wt 92.3 kg (203 lb 7.8 oz)   SpO2 100%   BMI 29.20 kg/m²      Labs Reviewed and Include    Recent Labs   Lab 11/12/18  0351 11/12/18  0808 11/12/18  1157   * 126*  --    CALCIUM 8.1* 8.4*  --    ALBUMIN 2.7*  --   --    PROT 3.7*  --   --     137  --    K 3.4* 4.1  --    CO2 21* 22*  --     105  --    BUN 29* 31*  --    CREATININE 3.4* 3.6*  --    ALKPHOS 65  --   --    *  --   --    *  249* 211* 194*   BILITOT 8.6*  --   --      Lab Results   Component Value Date    WBC 10.37 11/12/2018    HGB 8.2 (L) 11/12/2018    HCT 23.1 (L) 11/12/2018    MCV 90 11/12/2018    PLT 50 (L) 11/12/2018     No results for input(s): TSH, FREET4 in the last 168 hours.  Lab Results   Component Value Date    HGBA1C 4.4 11/09/2018       Nutritional status:   Body mass index is 29.2 kg/m².  Lab Results   Component Value Date    ALBUMIN " 2.7 (L) 11/12/2018    ALBUMIN 2.0 (L) 11/11/2018    ALBUMIN 2.0 (L) 11/11/2018     Lab Results   Component Value Date    PREALBUMIN 7 (L) 10/27/2018       Estimated Creatinine Clearance: 27.1 mL/min (A) (based on SCr of 3.6 mg/dL (H)).    Accu-Checks  Recent Labs     11/12/18  0511 11/12/18  0556 11/12/18  0717 11/12/18  0811 11/12/18  0903 11/12/18  1013 11/12/18  1110 11/12/18  1155 11/12/18  1315 11/12/18  1455   POCTGLUCOSE 164* 178* 138* 129* 114* 131* 140* 150* 147* 172*       Current Medications and/or Treatments Impacting Glycemic Control  Immunotherapy:    Immunosuppressants         Stop Route Frequency     tacrolimus (PROGRAF) 1 mg/mL oral syringe      -- Oral 2 times daily     mycophenolate mofetil 200 mg/mL suspension 1,000 mg      -- Oral 2 times daily        Steroids:   Hormones (From admission, onward)    Start     Stop Route Frequency Ordered    11/17/18 0900  predniSONE tablet 20 mg  (methylprednisolone taper panel)      -- Oral Daily 11/11/18 1208    11/16/18 0900  methylPREDNISolone sodium succinate injection 20 mg  (methylprednisolone taper panel)      11/17 0859 IV Every 12 hours 11/11/18 1208    11/15/18 0900  methylPREDNISolone sodium succinate injection 40 mg  (methylprednisolone taper panel)      11/16 0859 IV Every 12 hours 11/11/18 1208    11/14/18 0900  methylPREDNISolone sodium succinate injection 60 mg  (methylprednisolone taper panel)      11/15 0859 IV Every 12 hours 11/11/18 1208    11/13/18 0900  methylPREDNISolone sodium succinate injection 80 mg  (methylprednisolone taper panel)      11/14 0859 IV Every 12 hours 11/11/18 1208    11/12/18 0900  methylPREDNISolone sodium succinate injection 100 mg  (methylprednisolone taper panel)      11/13 0859 IV Every 12 hours 11/11/18 1208    11/11/18 1445  vasopressin (PITRESSIN) 0.2 Units/mL in dextrose 5 % 100 mL infusion      -- IV Continuous 11/11/18 1337    11/09/18 1345  methylPREDNISolone sodium succinate injection 500 mg  (Med -  Immunosuppression Induction Therapy (Methylprednisolone))      11/10 0144 IV Once pre-op 11/09/18 1236        Pressors:    Autonomic Drugs (From admission, onward)    Start     Stop Route Frequency Ordered    11/11/18 1335  norepinephrine 4 mg in dextrose 5% 250 mL infusion (premix) (titrating)     Question Answer Comment   Titrate by: (in mcg/kg/min) 0.01    Titrate interval: (in minutes) 2    Titrate to maintain: (MAP or SBP) MAP    Greater than: (in mmHg) 65    Maximum dose: (in mcg/kg/min) 2        -- IV Continuous 11/11/18 1337        Hyperglycemia/Diabetes Medications:   Antihyperglycemics (From admission, onward)    Start     Stop Route Frequency Ordered    11/11/18 1415  insulin regular (Humulin R) 100 Units in sodium chloride 0.9% 100 mL infusion     Question:  Insulin Rate Adjustment (DO NOT MODIFY ANSWER)  Answer:  [email or web address]    -- IV Continuous 11/11/18 1313          ASSESSMENT and PLAN    * S/P liver transplant    Managed per LTS.   avoid hypoglycemia  Lab Results   Component Value Date     (H) 11/12/2018     (H) 11/12/2018    GGT 66 (H) 11/12/2018    ALKPHOS 65 11/12/2018    BILITOT 8.6 (H) 11/12/2018            Acute hyperglycemia    Will clarify if patient with DM once extubated or family at bedside.   BG goal 140-180.       Continue IV insulin protocol; start if needed.   Requires q1 hr BG monitoring.     ADDENDUM: change to q2 hr bg monitoring.      Adrenal cortical steroids causing adverse effect in therapeutic use    May increase insulin resistance.        DEL (acute kidney injury)    Avoid insulin stacking and hypoglycemia.  Lab Results   Component Value Date    CREATININE 3.6 (H) 11/12/2018            Hepatorenal syndrome    Avoid insulin stacking and hypoglycemia.         Prophylactic immunotherapy    May increase insulin resistance.              Tessa Falk NP  Endocrinology  [facility]

## 2018-11-13 NOTE — SUBJECTIVE & OBJECTIVE
"Interval History/Significant Events: Patient extubated yesterday, now on 2L NC. He was weaned off pressors. This AM he pulled out all 3 of his JOSE A drains - patient seems unclear of why he did this, says "I thought I had another surgery today." He is fully oriented x 3. CRRT done overnight, patient net -190 cc over 24h. He received 2U pRBC yesterday AM, Hgb increased 7.4 -> 8.7, now 8.0.     Follow-up For: Procedure(s) (LRB):  TRANSPLANT, LIVER (N/A)    Post-Operative Day: 2 Days Post-Op    Objective:     Vital Signs (Most Recent):  Temp: 97.8 °F (36.6 °C) (11/13/18 0700)  Pulse: 90 (11/13/18 0830)  Resp: 20 (11/13/18 0830)  BP: 104/70 (11/13/18 0830)  SpO2: 99 % (11/13/18 0830) Vital Signs (24h Range):  Temp:  [96.8 °F (36 °C)-98.1 °F (36.7 °C)] 97.8 °F (36.6 °C)  Pulse:  [] 90  Resp:  [2-26] 20  SpO2:  [97 %-100 %] 99 %  BP: ()/(52-71) 104/70  Arterial Line BP: ()/(43-66) 109/53     Weight: 90 kg (198 lb 6.6 oz)  Body mass index is 28.47 kg/m².      Intake/Output Summary (Last 24 hours) at 11/13/2018 0840  Last data filed at 11/13/2018 0700  Gross per 24 hour   Intake 4533.77 ml   Output 4803 ml   Net -269.23 ml       Physical Exam   Constitutional: He is oriented to person, place, and time. He appears well-developed.   jaundiced   HENT:   Head: Normocephalic and atraumatic.   Eyes: Scleral icterus is present.   Cardiovascular: Normal rate, regular rhythm and intact distal pulses.   Pulmonary/Chest: Effort normal. No respiratory distress.   Coarse BS b/l   Abdominal: Soft. He exhibits no distension.   Chevron incision c/d/i  Dressing over former site of right drain; ostomy bag over former left sided drain sites, collecting SS output   Musculoskeletal: He exhibits edema.   Neurological: He is alert and oriented to person, place, and time.   Skin: Skin is warm and dry.   Diffuse jaundice       Lines/Drains/Airways     Central Venous Catheter Line                 Percutaneous Central Line " Insertion/Assessment - double lumen  right subclavian -- days         Tunneled Central Line Insertion/Assessment - Double Lumen  11/07/18 1350 right internal jugular 5 days         Percutaneous Central Line Insertion/Assessment - double lumen  11/11/18 0205 right femoral 2 days          Drain                 Urethral Catheter 11/11/18 0246 Straight-tip;Non-latex 16 Fr. 2 days          Arterial Line                 Arterial Line 11/11/18 0159 Left Radial 2 days         Arterial Line 11/11/18 0205 Right Femoral 2 days          Peripheral Intravenous Line                 Peripheral IV - Single Lumen 11/09/18 1700 Right Antecubital 3 days         Peripheral IV - Single Lumen 11/11/18 0212 Left Antecubital 2 days                Significant Labs:    CBC/Anemia Profile:  Recent Labs   Lab 11/12/18  1452 11/12/18  2006 11/13/18  0406   WBC 10.37 15.22* 13.55*   HGB 8.2* 8.7* 8.0*   HCT 23.1* 24.3* 23.3*   PLT 50* 63* 59*   MCV 90 89 90   RDW 14.6* 15.0* 14.9*        Chemistries:  Recent Labs   Lab 11/11/18  1210  11/12/18  0351 11/12/18  0808 11/12/18  1157 11/12/18  2203 11/13/18  0406      < > 138 137  --  136 137  137   K 4.1   < > 3.4* 4.1  --  4.4 4.5  4.5      < > 104 105  --  103 103  103   CO2 23   < > 21* 22*  --  25 25  25   BUN 16   < > 29* 31*  --  15 13  13   CREATININE 2.2*   < > 3.4* 3.6*  --  1.8* 1.5*  1.5*   CALCIUM 9.5   < > 8.1* 8.4*  --  7.7* 7.8*  7.8*   ALBUMIN 2.0*  --  2.7*  --   --  2.4* 2.2*  2.2*   PROT 3.1*  --  3.7*  --   --   --  3.7*   BILITOT 11.3*  --  8.6*  --   --   --  10.8*   ALKPHOS 62  --  65  --   --   --  85   *  --  310*  --   --   --  339*   *   < > 249*  249* 211* 194*  --  163*   MG  --    < > 2.3  --   --  1.9 1.6   PHOS  --    < > 4.0  --   --  3.3 3.2    < > = values in this interval not displayed.       Significant Imaging:  I have reviewed and interpreted all pertinent imaging results/findings within the past 24 hours.

## 2018-11-13 NOTE — ASSESSMENT & PLAN NOTE
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   - s/p OHLTx 11/11/2018   - remains anuric   - Had intra-op SLED  - CVP 1 now 5    Plan:  - tolerated SLED-RRT with good clearance   - Will plan to hold SELD unless he declines and requires RRT  - Remains anuric  - Strict I/O and chart  - Avoid nephrotoxic medications  - please keep Hb > 7 gm/dL or higher if symptomatic  - Medication doses adjusted to GFR

## 2018-11-13 NOTE — PT/OT/SLP RE-EVAL
"Physical Therapy Re-Assessment / Treatment    Patient Name:  Femi Enciso   MRN:  [id number]  Admitting Diagnosis:  S/P liver transplant   Recent Surgery: Procedure(s) (LRB):  TRANSPLANT, LIVER (N/A) 2 Days Post-Op    Hospital Course:  10/27/18: Admit date  10/30/18: PT initial evaluation  11/11/18: liver transplant  11/12/18: PT reconsulted  Please refer to PT initial evaluation for PLOF and social history.  Recommendations:     Discharge Recommendations:  home with home health   Discharge Equipment Recommendations: walker, rolling, wheelchair   Barriers to discharge: Decreased caregiver support    Plan:     During this hospitalization, patient to be seen 3 x/week to address the above listed problems via gait training, therapeutic activities, therapeutic exercises, neuromuscular re-education  · Plan of Care Expires:  12/12/18   Plan of Care Reviewed with: patient, mother    This Plan of care has been discussed with the patient who was involved in its development and understands and is in agreement with the identified goals and treatment plan    Subjective     Communicated with RN prior to session.  Patient found supine upon PT entry to room, agreeable to evaluation.  Pt's mother present throughout session.  "I'd like that."  Chief Complaint: none stated  Patient comments/goals: to return home.  Pain/Comfort:  · Pain Rating 1: 0/10  · Pain Rating Post-Intervention 1: 0/10    Objective:     Patient found with: blood pressure cuff, pulse ox (continuous), telemetry, central line     General Precautions: Standard, Cardiac fall, contact(VRE in urine)   Orthopedic Precautions:N/A   Braces: N/A   Respiratory Status: room air  Vital Signs (Most Recent):    Temp: 97.5 °F (36.4 °C) (11/13/18 1500)  Pulse: 89 (11/13/18 1600)  Resp: 14 (11/13/18 1600)  BP: 105/64 (11/13/18 1500)  SpO2: 99 % (11/13/18 1600)    Exam:  · Mental Status: Patient is AxOx4 and follows all multi-step verbal commands. Pt is Alert and Cooperative during " session.  · Skin Integrity: Visible skin intact, jaundiced  · Edema: moderate of BLE  · Sensation: Intact  · Hearing: Intact  · Vision:  Intact  · Range of Motion:  · RUE: WFL  · LUE: WFL  · RLE: WFL  · LLE: WFL  · Strength Exam:  · Lower Extremity Strength  (R) LE  (L) LE    Hip Flexion: 5/5 Hip flexion: 5/5   Knee flexion: 5/5 Knee flexion: 5/5   Knee extension: 5/5 Knee extension 5/5   Ankle dorsiflexion: 5/5 Ankle dorsiflexion: 5/5   Ankle plantarflexion: 5/5 Ankle plantarflexion: 5/5     Functional Mobility:  Bed Mobility:   · Pt found/returned to bedside chair    Transfers:   · Sit <> Stand Transfer: minimum assistance with hand-held assist   · Stand <> Sit Transfer: minimum assistance with hand-held assist   · x1 from EOB      Gait:  · Patient ambulated: 5 steps fwd/bkwd at EOB (mobility limited by lines).   · Patient required: minimal assist  · Patient used:  HHA  · Gait Pattern observed: 2-point gait  · Gait Deviation(s): decreased daniela  · Impairments due to: decreased balance  · Comments: Pt with short step length bilaterally.    Therapeutic Activities & Exercises:   Education:  Patient provided with daily orientation and goals of this PT session. Patient and family agreed to participate in session. Patient and family aware of patient's deficits and therapy progression. They were educated to transfer to bedside chair/bedside commode/bathroom with RN/PCT present. Encouraged patient to perform daily exercises & mobility to increase endurance and decrease effects of bedrest. Time provided for therapeutic counseling and discussion of health disposition. All questions answered to patient's and family's satisfaction, within scope of PT practice; voiced no other concerns. White board updated in patient's room, RN notified of session.    Patient left up in chair, with head in midline, neutral pelvis & heels floated for skin protection with all lines intact, call button in reach and RN notified.    AM-PAC 6  CLICK MOBILITY  Total Score:16     Assessment:     Femi Enciso is a 53 y.o. male admitted with a medical diagnosis of S/P liver transplant.  He presents with the following impairments/functional limitations: decreased functional mobility and balance.. Pt highly motivated to participate in session. . Femi Enciso's deficits effect their ability to safely and independently participate in self-care tasks and functional mobility which increases their caregiver burden. Due to his physical therapy diagnosis of debility and deconditioning, they continue to benefit from acute PT services to address the following limitations: high fall risk, weakness, instability, and the need for supervised instruction of exercise. Femi Robertss deficits effect their roles and responsibilities in which they were able to complete prior to admit. Education was provided to patient & family regarding importance of continued participation in therapy, patient's progress and discharge disposition. Pt would benefit from an intensive and collaborative PT/OT/SLP therapy program to improve quality of life and focus on recovery of impairments.     Problem List: weakness, gait instability, impaired endurance, impaired balance, impaired self care skills, impaired functional mobilty, impaired cardiopulmonary response to activity  Rehab Prognosis: good; patient would benefit from acute skilled PT services to address these deficits and reach maximum level of function.      GOALS:   Multidisciplinary Problems     Physical Therapy Goals        Problem: Physical Therapy Goal    Goal Priority Disciplines Outcome Goal Variances Interventions   Physical Therapy Goal     PT, PT/OT Ongoing (interventions implemented as appropriate)     Description:  Goals to be met by: 2018     Patient will increase functional independence with mobility by performin. Supine to sit with Modified [place]  2. Sit to stand transfer with [place]  3. Bed to chair  transfer with independence using no AD  4. Gait  x 50 feet with Supervision using LRAD.   5. Lower extremity exercise program x20 reps per handout, with independence                        Time Tracking:     PT Received On: 11/13/18  PT Start Time: 1455     PT Stop Time: 1512  PT Total Time (min): 17 min     Billable Minutes: Re-eval 17    Alison Gaines PT, DPT  11/13/2018  Pager:[phone]

## 2018-11-13 NOTE — PROGRESS NOTES
[facility]  Liver Transplant  Progress Note    Patient Name: Femi Enciso  MRN: [id number]  Admission Date: 10/27/2018  Hospital Length of Stay: 17 days  Code Status: Full Code  Primary Care Provider: Primary Doctor No  Post-Op Day 2 Days Post-Op      ORGAN:   LIVER  Disease Etiology: Alcoholic Cirrhosis  Donor Type:    - Brain Death  CDC High Risk:   No  Donor CMV Status:   Donor CMV Status: Positive  Donor HBcAB:   Negative  Donor HCV Status:   Negative  Donor HBV JAVIER: Negative  Donor HCV JAVIER: Negative  Whole or Partial: Whole Liver  Biliary Anastomosis: End to End  Arterial Anatomy: Standard    Subjective:     Scheduled Meds:   albumin human 5%  25 g Intravenous Once    albumin human 5%  25 g Intravenous Once    ciprofloxacin  400 mg Intravenous Q12H    famotidine (PF)  20 mg Intravenous Daily    fluconazole (DIFLUCAN) IVPB  200 mg Intravenous Q24H    furosemide  40 mg Intravenous BID    heparin (porcine)  5,000 Units Subcutaneous Q8H    methylPREDNISolone sodium succinate  80 mg Intravenous Q12H    Followed by    [START ON 2018] methylPREDNISolone sodium succinate  60 mg Intravenous Q12H    Followed by    [START ON 11/15/2018] methylPREDNISolone sodium succinate  40 mg Intravenous Q12H    Followed by    [START ON 2018] methylPREDNISolone sodium succinate  20 mg Intravenous Q12H    Followed by    [START ON 2018] predniSONE  20 mg Oral Daily    mycophenolate mofetil  1,000 mg Oral BID    tacrolimus  2 mg Oral BID    [START ON 2018] valganciclovir 50 mg/ml  450 mg Per NG tube Daily     Continuous Infusions:   sodium chloride 0.9% 200 mL/hr at 18 0300    insulin (HUMAN R) infusion (adults) 0.5 Units/hr (18 1300)    norepinephrine bitartrate-D5W Stopped (18 0630)    propofol Stopped (18 1500)    vasopressin (PITRESSIN) infusion Stopped (18 1600)     PRN Meds:sodium chloride, dextrose 50%, dextrose 50%, glucagon (human  recombinant), glucose, glucose, insulin aspart U-100, magnesium sulfate IVPB, oxyCODONE, oxyCODONE, ramelteon    Review of Systems  Objective:     Vital Signs (Most Recent):  Temp: 98.8 °F (37.1 °C) (11/13/18 1245)  Pulse: 90 (11/13/18 1300)  Resp: 20 (11/13/18 1300)  BP: 101/61 (11/13/18 1200)  SpO2: 99 % (11/13/18 1300) Vital Signs (24h Range):  Temp:  [97 °F (36.1 °C)-98.8 °F (37.1 °C)] 98.8 °F (37.1 °C)  Pulse:  [] 90  Resp:  [7-26] 20  SpO2:  [96 %-100 %] 99 %  BP: ()/(52-70) 101/61  Arterial Line BP: ()/(43-61) 113/61     Weight: 90 kg (198 lb 6.6 oz)  Body mass index is 28.47 kg/m².    Intake/Output - Last 3 Shifts       11/11 0700 - 11/12 0659 11/12 0700 - 11/13 0659 11/13 0700 - 11/14 0659    P.O.   300    I.V. (mL/kg) 8942 (96.9) 4115.8 (45.7) 100 (1.1)    Blood 7059 418 315    Other 250      NG/GT 35      Total Intake(mL/kg) 59783 (176.4) 4533.8 (50.4) 715 (7.9)    Urine (mL/kg/hr) 22 (0) 15 (0) 0 (0)    Emesis/NG output 300      Drains 2462 1719     Other 175 3084 80    Stool       Blood 9100      Total Output 67411 4818 80    Net +4227 -284.2 +635                 Physical Exam   Constitutional: He is oriented to person, place, and time. He appears well-developed.   jaundiced   HENT:   Head: Normocephalic and atraumatic.   Eyes: Scleral icterus is present.   Cardiovascular: Normal rate, regular rhythm and intact distal pulses.   Pulmonary/Chest: Effort normal. No respiratory distress.   Coarse BS b/l   Abdominal: Soft. He exhibits no distension.   Chevron incision c/d/i  Dressing over former site of right drain; ostomy bag over former left sided drain sites, collecting SS output   Musculoskeletal: He exhibits edema.   Neurological: He is alert and oriented to person, place, and time.   Skin: Skin is warm and dry.   Diffuse jaundice       Laboratory:  Immunosuppressants         Stop Route Frequency     tacrolimus (PROGRAF) 1 mg/mL oral syringe      -- Oral 2 times daily     mycophenolate  mofetil 200 mg/mL suspension 1,000 mg      -- Oral 2 times daily        Labs within the past 24 hours have been reviewed.    Diagnostic Results:  I have personally reviewed all pertinent imaging studies.    Assessment/Plan:   Femi Enciso is a 53 y.o. male     Derm   Acute maculopapular rash    - morbilliform rash likely from drug reaction possibly Cefepime (10/27-10/30).  Per patient rash is very pruritic.  He has been using steroid cream w minimal relief.  He stated significant improvement w receiving Hydroxyzine.  Monitor.         Renal/   Metabolic acidosis    - well managed on sodium bicarb.  Trend daily.        DEL (acute kidney injury)    - DEL over past 2 weeks.  Nephrology was consulted.  Pt not a candidate for kidney transplant before 12-4-18.    - pt tolerating HD w no fluid removed today given hypotension.    - pt w offer for liver transplant this evening.  He will receive intra-op HD.    - cont strict I/O's.         Hematology   Coagulopathy    - to improve w transplant.  No evidence of acute bleeding, no hematemesis or BRBPR.         Endocrine   Moderate protein malnutrition    - temporal muscle wasting noted.  Decreased appetite and energy over past week worse.   Dietician consulted on admit.  Will consult post transplant as needed.         GI   * S/P liver transplant      53 year old male with history of ESLD 2/2 ETOH cirrhosis who is s/p liver transplant. POD#2    Neuro  -extubated and off sedation    -PRN pain meds   -monitor neuro exam off sedation     CV  -off pressor support   -swan tricia catheter has been removed   -monitor on telemetry     Resp  -extubated yesterday w/o issues   -continue to monitor O2 sats     Heme  -f/u AM CBC, stable at this time   -transfuse as needed      ID  -monitor fever and WBC     MSK  -OOBTC  -ambulation as tolerated      FEN/GI  -replace lytes   -Diabetic diet     Endocrine  -insulin as needed. Monitor BG     dispo  -continue ICU care. Hopefully transfer to TSU  tomorrow      Hepatorenal syndrome    - del past 2 weeks.  Pt on Midodrine.  Last HD 11/9/18- 0 fluid removed 2/2 hypotension.  - pt will receive intra op HD.         Jaundice    - t bili 37.5.  Monitor.       Other   Pre-transplant evaluation for liver transplant      52 y/o man with DM2, ESLD secondary to EtOH abuse in 09/2018, DEL progressing to ESRD requiring RRT. He has had worsening renal function since September 2018. His last drink was in 09/2018. He has had no surgical procedures on abdomen and has been mobilizing well till admission. He may qualify as SLK candidate in view of renal function deterioration over 6 weeks. He needs Transplant nephrology opinion about candidacy for SLK transplant. His cardiac evaluation needs to be updated and cross sectional imaging is needed prior to activation. He is  Surgically acceptable & SC risk A candidate on surgical evaluation. Addiction Psych consult shall be made during this admission.         The patients clinical status was discussed at multidisplinary rounds, involving transplant surgery, transplant medicine, pharmacy, nursing, nutrition, and social work.    Nadia Micahel MD  Liver Transplant  [facility]

## 2018-11-13 NOTE — ASSESSMENT & PLAN NOTE
S/P liver transplant     54 y/o man with ESLD, ESRD and DM2 now s/p liver transplant     Neuro:   - Sedation: none  - Pain control: PRN fentanyl     Pulmonary:   - 2L NC; wean to RA as tolerated  - daily cxr  - duonebs prn     Cardiac:  - Drips: none  - HDS  - mgmt per transplant     Renal:   -CRRT intraop and overnight  -trend BMP  - BUN/Cr: 13/1.5     Infectious Disease:   - AF  - Trend WBC - 12 -> 13  - Abx: Cipro (UCx 11/11 grew Enterococcus)  - Prophylaxis per transplant - Valcyte, Diflucan  - IS per Transplant - MMF, Prograf     Hematology/Oncology:  - H&H 7.4/20.8 -> 8.0/23 after 2U pRBCs  - Plt 84 -> 59  - INR 1.7 -> 1.5  - Trend CBC     Endocrine:  - q2h accuchecks - 150s-170s  - insulin gtt     Fluids/Electrolytes/Nutrition/GI:   - NPO; ADAT per Transplant  - Trend CMP  - Replace Lytes PRN  - liver US 11/12 - 6.6 cm complex fluid collection deep to left lobe; increased flow velocity in main hepatic artery      Prophylaxis:  - SQH  - Famotidine     Dispo:  Continue ICU care  Primary team:Transplant

## 2018-11-13 NOTE — ASSESSMENT & PLAN NOTE
BG goal 140-180    Discontinue intensive insulin protocol  Transition IV insulin infusion at 0.5 u/hr  Add Novolog 2 units with meals  Low dose corrections scale  Change BG monitoring to //0200    Discharge planning: HENNY

## 2018-11-13 NOTE — PROGRESS NOTES
[facility]  Nephrology  Progress Note    Patient Name: Femi Enciso  MRN: [id number]  Admission Date: 10/27/2018  Hospital Length of Stay: 17 days  Attending Provider: Femi Patel MD   Primary Care Physician: Primary Doctor No  Principal Problem:S/P liver transplant    Subjective:     HPI: 52 y/o man with DM2 presents to the ED with family for liver failure (likely due to EtOH abundant history of drinking - diagnosed in Sept 2018 in [place]).  He reports jaundice, generalized weakness, nausea, diarrhea, and decreased appetite since Sept 2018.  He is from [place], [place], and Dr. Sharma (patients physician) recommended bringing him to hospital for evaluation.  Patient denies any fever, chills, vomiting, chest pain, palpitations, SOB, abdominal pain.      Nephrology consulted for evaluation/management Adri.     Interval History:   NAEON, tolerated SLED overnight. This am he feels better extubated yesterday. MS improved AAOx4 . CVP down to 1 post SLED and after 2 piggy bags CVP 5  Hb stable this am no plans for transfusion. Remains anuric. Off pressors. FLuid balance is neg 269 ml. Drains output overnight 1.6 lts.   Review of patient's allergies indicates:   Allergen Reactions    Penicillins Nausea And Vomiting and Rash     Current Facility-Administered Medications   Medication Frequency    0.9%  NaCl infusion (for blood administration) Q24H PRN    albumin human 5% bottle 25 g Once    albumin human 5% bottle 25 g Once    ciprofloxacin (CIPRO)400mg/200ml D5W IVPB 400 mg Q12H    dextrose 50% injection 12.5 g PRN    dextrose 50% injection 25 g PRN    famotidine (PF) injection 20 mg Daily    fluconazole (DIFLUCAN) IVPB 200 mg 100 mL Q24H    furosemide injection 40 mg BID    glucagon (human recombinant) injection 1 mg PRN    glucose chewable tablet 16 g PRN    glucose chewable tablet 24 g PRN    heparin (porcine) injection 5,000 Units Q8H    insulin aspart U-100 pen 0-4 Units PRN    insulin  aspart U-100 pen 2 Units TIDWM    insulin regular (Humulin R) 100 Units in sodium chloride 0.9% 100 mL infusion Continuous    methylPREDNISolone sodium succinate injection 80 mg Q12H    Followed by    [START ON 11/14/2018] methylPREDNISolone sodium succinate injection 60 mg Q12H    Followed by    [START ON 11/15/2018] methylPREDNISolone sodium succinate injection 40 mg Q12H    Followed by    [START ON 11/16/2018] methylPREDNISolone sodium succinate injection 20 mg Q12H    Followed by    [START ON 11/17/2018] predniSONE tablet 20 mg Daily    mycophenolate mofetil 200 mg/mL suspension 1,000 mg BID    norepinephrine 4 mg in dextrose 5% 250 mL infusion (premix) (titrating) Continuous    oxyCODONE immediate release tablet 5 mg Q6H PRN    oxyCODONE immediate release tablet Tab 10 mg Q4H PRN    propofol (DIPRIVAN) 10 mg/mL infusion Continuous    ramelteon tablet 8 mg Nightly PRN    tacrolimus (PROGRAF) 1 mg/mL oral syringe BID    [START ON 11/21/2018] valganciclovir 50 mg/ml oral solution 450 mg Daily    vasopressin (PITRESSIN) 0.2 Units/mL in dextrose 5 % 100 mL infusion Continuous       Objective:     Vital Signs (Most Recent):  Temp: 97.5 °F (36.4 °C) (11/13/18 1500)  Pulse: 89 (11/13/18 1600)  Resp: 14 (11/13/18 1600)  BP: 105/64 (11/13/18 1500)  SpO2: 99 % (11/13/18 1600)  O2 Device (Oxygen Therapy): nasal cannula (11/13/18 0600) Vital Signs (24h Range):  Temp:  [97.5 °F (36.4 °C)-98.8 °F (37.1 °C)] 97.5 °F (36.4 °C)  Pulse:  [] 89  Resp:  [7-26] 14  SpO2:  [96 %-100 %] 99 %  BP: ()/(52-70) 105/64  Arterial Line BP: ()/(43-62) 102/47     Weight: 90 kg (198 lb 6.6 oz) (11/13/18 0629)  Body mass index is 28.47 kg/m².  Body surface area is 2.11 meters squared.    I/O last 3 completed shifts:  In: 6182.8 [I.V.:5729.8; Blood:418; NG/GT:35]  Out: 6160 [Urine:15; Drains:2981; Other:3164]    Physical Exam   Constitutional: He appears well-developed. He appears cachectic. He is cooperative. No  distress. Nasal cannula in place.   AAOx3   HENT:   Head: Normocephalic and atraumatic.   Eyes: Conjunctivae are normal. Pupils are equal, round, and reactive to light.   Neck: Trachea normal. Neck supple. No JVD present.   Cardiovascular: Normal rate, regular rhythm, S1 normal, S2 normal and normal pulses. Exam reveals no gallop and no friction rub.   No murmur heard.  Pulmonary/Chest: Effort normal. He has decreased breath sounds in the right lower field and the left lower field. He has no rhonchi.   Abdominal: Soft. He exhibits distension. He exhibits no ascites. Bowel sounds are decreased. There is no tenderness.       Musculoskeletal: Normal range of motion. He exhibits edema (edema+ trace pitting sacral).   Neurological:   Awake alert and Oriented x 3   Skin: Skin is warm and dry. Capillary refill takes less than 2 seconds.   Psychiatric: He has a normal mood and affect. His behavior is normal.   Vitals reviewed.      Significant Labs:  ABGs:   Recent Labs   Lab 11/13/18  0453   PH 7.492*   PCO2 34.5*   HCO3 26.4   POCSATURATED 93*   BE 3     BMP:   Recent Labs   Lab 11/13/18  1425   *      CO2 23   BUN 24*   CREATININE 2.1*   CALCIUM 7.9*   MG 2.3     Cardiac Markers: No results for input(s): CKMB, TROPONINT, MYOGLOBIN in the last 168 hours.  CBC:   Recent Labs   Lab 11/13/18  0406   WBC 13.55*   RBC 2.58*   HGB 8.0*   HCT 23.3*   PLT 59*   MCV 90   MCH 31.0   MCHC 34.3     CMP:   Recent Labs   Lab 11/13/18  0406 11/13/18  1425   *  160* 220*   CALCIUM 7.8*  7.8* 7.9*   ALBUMIN 2.2*  2.2* 2.6*   PROT 3.7*  --      137 135*   K 4.5  4.5 4.7   CO2 25  25 23     103 102   BUN 13  13 24*   CREATININE 1.5*  1.5* 2.1*   ALKPHOS 85  --    *  --    *  --    BILITOT 10.8*  --      Coagulation:   Recent Labs   Lab 11/13/18  0406   INR 1.5*   APTT 37.7*     Recent Labs   Lab 11/09/18  1737   COLORU Green*   SPECGRAV 1.025   PHUR 6.5   PROTEINUA 2+*   BACTERIA  Occasional   NITRITE Negative   LEUKOCYTESUR Trace*   HYALINECASTS 0     All labs within the past 24 hours have been reviewed.     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   - s/p OHLTx 11/11/2018   - remains anuric   - Had intra-op SLED  - CVP 1 now 5    Plan:  - tolerated SLED-RRT with good clearance   - Will plan to hold SELD unless he declines and requires RRT  - Remains anuric  - Strict I/O and chart  - Avoid nephrotoxic medications  - please keep Hb > 7 gm/dL or higher if symptomatic  - Medication doses adjusted to GFR           Thank you for your consult. I will follow-up with patient. Please contact us if you have any additional questions.    Nikolay Nino MD  Nephrology  [facility]

## 2018-11-13 NOTE — ASSESSMENT & PLAN NOTE
Managed per LTS.   avoid hypoglycemia  Lab Results   Component Value Date     (H) 11/12/2018     (H) 11/12/2018    GGT 66 (H) 11/12/2018    ALKPHOS 65 11/12/2018    BILITOT 8.6 (H) 11/12/2018

## 2018-11-13 NOTE — PROGRESS NOTES
"Dr. Reno made aware that while nurse walked away from bedside for approx. 10 minutes to gather bath supplies, upon return patient had pulled out all 3 JOSE A drains and removed chevron dressing, stating "And now I feel better." Occlusive dressings applied to JOSE A sites, and chevron painted with betadine and covered with occlusive dressing.   "

## 2018-11-13 NOTE — SUBJECTIVE & OBJECTIVE
Scheduled Meds:   albumin human 5%  25 g Intravenous Once    albumin human 5%  25 g Intravenous Once    ciprofloxacin  400 mg Intravenous Q12H    famotidine (PF)  20 mg Intravenous Daily    fluconazole (DIFLUCAN) IVPB  200 mg Intravenous Q24H    furosemide  40 mg Intravenous BID    heparin (porcine)  5,000 Units Subcutaneous Q8H    methylPREDNISolone sodium succinate  80 mg Intravenous Q12H    Followed by    [START ON 11/14/2018] methylPREDNISolone sodium succinate  60 mg Intravenous Q12H    Followed by    [START ON 11/15/2018] methylPREDNISolone sodium succinate  40 mg Intravenous Q12H    Followed by    [START ON 11/16/2018] methylPREDNISolone sodium succinate  20 mg Intravenous Q12H    Followed by    [START ON 11/17/2018] predniSONE  20 mg Oral Daily    mycophenolate mofetil  1,000 mg Oral BID    tacrolimus  2 mg Oral BID    [START ON 11/21/2018] valganciclovir 50 mg/ml  450 mg Per NG tube Daily     Continuous Infusions:   sodium chloride 0.9% 200 mL/hr at 11/13/18 0300    insulin (HUMAN R) infusion (adults) 0.5 Units/hr (11/13/18 1300)    norepinephrine bitartrate-D5W Stopped (11/13/18 0630)    propofol Stopped (11/12/18 1500)    vasopressin (PITRESSIN) infusion Stopped (11/11/18 1600)     PRN Meds:sodium chloride, dextrose 50%, dextrose 50%, glucagon (human recombinant), glucose, glucose, insulin aspart U-100, magnesium sulfate IVPB, oxyCODONE, oxyCODONE, ramelteon    Review of Systems  Objective:     Vital Signs (Most Recent):  Temp: 98.8 °F (37.1 °C) (11/13/18 1245)  Pulse: 90 (11/13/18 1300)  Resp: 20 (11/13/18 1300)  BP: 101/61 (11/13/18 1200)  SpO2: 99 % (11/13/18 1300) Vital Signs (24h Range):  Temp:  [97 °F (36.1 °C)-98.8 °F (37.1 °C)] 98.8 °F (37.1 °C)  Pulse:  [] 90  Resp:  [7-26] 20  SpO2:  [96 %-100 %] 99 %  BP: ()/(52-70) 101/61  Arterial Line BP: ()/(43-61) 113/61     Weight: 90 kg (198 lb 6.6 oz)  Body mass index is 28.47 kg/m².    Intake/Output - Last 3 Shifts        11/11 0700 - 11/12 0659 11/12 0700 - 11/13 0659 11/13 0700 - 11/14 0659    P.O.   300    I.V. (mL/kg) 8942 (96.9) 4115.8 (45.7) 100 (1.1)    Blood 7059 418 315    Other 250      NG/GT 35      Total Intake(mL/kg) 40824 (176.4) 4533.8 (50.4) 715 (7.9)    Urine (mL/kg/hr) 22 (0) 15 (0) 0 (0)    Emesis/NG output 300      Drains 2462 1719     Other 175 3084 80    Stool       Blood 9100      Total Output 75184 4818 80    Net +4227 -284.2 +635                 Physical Exam   Constitutional: He is oriented to person, place, and time. He appears well-developed.   jaundiced   HENT:   Head: Normocephalic and atraumatic.   Eyes: Scleral icterus is present.   Cardiovascular: Normal rate, regular rhythm and intact distal pulses.   Pulmonary/Chest: Effort normal. No respiratory distress.   Coarse BS b/l   Abdominal: Soft. He exhibits no distension.   Chevron incision c/d/i  Dressing over former site of right drain; ostomy bag over former left sided drain sites, collecting SS output   Musculoskeletal: He exhibits edema.   Neurological: He is alert and oriented to person, place, and time.   Skin: Skin is warm and dry.   Diffuse jaundice       Laboratory:  Immunosuppressants         Stop Route Frequency     tacrolimus (PROGRAF) 1 mg/mL oral syringe      -- Oral 2 times daily     mycophenolate mofetil 200 mg/mL suspension 1,000 mg      -- Oral 2 times daily        Labs within the past 24 hours have been reviewed.    Diagnostic Results:  I have personally reviewed all pertinent imaging studies.

## 2018-11-13 NOTE — PLAN OF CARE
Notified by the [facility] the 11/9/2018 urine culture is positive for VRE-Enterococcus faecium. Follow CONTACT isolation, utilize appropriate PPE for patient encounters and sanitize hands after PPE removed.  Call the Infection Prevention dept for isolation discontinuation criteria.

## 2018-11-13 NOTE — SUBJECTIVE & OBJECTIVE
"Interval HPI:   Overnight events: Remains in ICU, extubated yesterday evening.  Mild confusion overnight, pulling out medical devices.  Nocturnal CRRT per nephrology.  BG at or slightly above goal on q 2 hr intensive insulin protocol, rates ranging from 0.3-1.1 u/hr.  Solu medrol 80 mg BID, standard steroid taper.  Eating:   NPO  Nausea: No  Hypoglycemia and intervention: No  Fever: No  TPN and/or TF: No    /70 (BP Location: Left arm, Patient Position: Lying)   Pulse 90   Temp 97.8 °F (36.6 °C) (Oral)   Resp 20   Ht 5' 10" (1.778 m)   Wt 90 kg (198 lb 6.6 oz)   SpO2 99%   BMI 28.47 kg/m²     Labs Reviewed and Include    Recent Labs   Lab 11/13/18 0406   *  160*   CALCIUM 7.8*  7.8*   ALBUMIN 2.2*  2.2*   PROT 3.7*     137   K 4.5  4.5   CO2 25  25     103   BUN 13  13   CREATININE 1.5*  1.5*   ALKPHOS 85   *   *   BILITOT 10.8*     Lab Results   Component Value Date    WBC 13.55 (H) 11/13/2018    HGB 8.0 (L) 11/13/2018    HCT 23.3 (L) 11/13/2018    MCV 90 11/13/2018    PLT 59 (L) 11/13/2018     No results for input(s): TSH, FREET4 in the last 168 hours.  Lab Results   Component Value Date    HGBA1C 4.4 11/09/2018       Nutritional status:   Body mass index is 28.47 kg/m².  Lab Results   Component Value Date    ALBUMIN 2.2 (L) 11/13/2018    ALBUMIN 2.2 (L) 11/13/2018    ALBUMIN 2.4 (L) 11/12/2018     Lab Results   Component Value Date    PREALBUMIN 7 (L) 10/27/2018       Estimated Creatinine Clearance: 64.3 mL/min (A) (based on SCr of 1.5 mg/dL (H)).    Accu-Checks  Recent Labs     11/12/18  1315 11/12/18  1455 11/12/18  1702 11/12/18  2005 11/12/18  2206 11/13/18  0004 11/13/18  0202 11/13/18  0405 11/13/18  0614 11/13/18  0824   POCTGLUCOSE 147* 172* 176* 152* 162* 162* 169* 173* 202* 205*       Current Medications and/or Treatments Impacting Glycemic Control  Immunotherapy:    Immunosuppressants         Stop Route Frequency     tacrolimus (PROGRAF) 1 mg/mL " oral syringe      -- Oral 2 times daily     mycophenolate mofetil 200 mg/mL suspension 1,000 mg      -- Oral 2 times daily        Steroids:   Hormones (From admission, onward)    Start     Stop Route Frequency Ordered    11/17/18 0900  predniSONE tablet 20 mg  (methylprednisolone taper panel)      -- Oral Daily 11/11/18 1208    11/16/18 0900  methylPREDNISolone sodium succinate injection 20 mg  (methylprednisolone taper panel)      11/17 0859 IV Every 12 hours 11/11/18 1208    11/15/18 0900  methylPREDNISolone sodium succinate injection 40 mg  (methylprednisolone taper panel)      11/16 0859 IV Every 12 hours 11/11/18 1208    11/14/18 0900  methylPREDNISolone sodium succinate injection 60 mg  (methylprednisolone taper panel)      11/15 0859 IV Every 12 hours 11/11/18 1208    11/13/18 0900  methylPREDNISolone sodium succinate injection 80 mg  (methylprednisolone taper panel)      11/14 0859 IV Every 12 hours 11/11/18 1208    11/11/18 1445  vasopressin (PITRESSIN) 0.2 Units/mL in dextrose 5 % 100 mL infusion      -- IV Continuous 11/11/18 1337    11/09/18 1345  methylPREDNISolone sodium succinate injection 500 mg  (Med - Immunosuppression Induction Therapy (Methylprednisolone))      11/10 0144 IV Once pre-op 11/09/18 1236        Pressors:    Autonomic Drugs (From admission, onward)    Start     Stop Route Frequency Ordered    11/11/18 1335  norepinephrine 4 mg in dextrose 5% 250 mL infusion (premix) (titrating)     Question Answer Comment   Titrate by: (in mcg/kg/min) 0.01    Titrate interval: (in minutes) 2    Titrate to maintain: (MAP or SBP) MAP    Greater than: (in mmHg) 65    Maximum dose: (in mcg/kg/min) 2        -- IV Continuous 11/11/18 1337        Hyperglycemia/Diabetes Medications:   Antihyperglycemics (From admission, onward)    Start     Stop Route Frequency Ordered    11/11/18 1415  insulin regular (Humulin R) 100 Units in sodium chloride 0.9% 100 mL infusion     Question:  Insulin Rate Adjustment (DO NOT  MODIFY ANSWER)  Answer:  [email or web address]    -- IV Continuous 11/11/18 1396

## 2018-11-13 NOTE — PLAN OF CARE
Problem: Physical Therapy Goal  Goal: Physical Therapy Goal  Goals to be met by: 2018     Patient will increase functional independence with mobility by performin. Supine to sit with Modified [place]  2. Sit to stand transfer with [place]  3. Bed to chair transfer with independence using no AD  4. Gait  x 50 feet with Supervision using LRAD.   5. Lower extremity exercise program x20 reps per handout, with independence     Outcome: Ongoing (interventions implemented as appropriate)    Pt would benefit from HH upon discharge.  Appropriate transfer level with nursing staff: Bed <> Chair:  Stand Pivot with Minimal Assistance with 1 person.    Alison Gaines PT, DPT  2018  Pager: [phone]

## 2018-11-13 NOTE — ASSESSMENT & PLAN NOTE
Avoid insulin stacking and hypoglycemia.  Lab Results   Component Value Date    CREATININE 2.1 (H) 11/13/2018

## 2018-11-13 NOTE — TELEPHONE ENCOUNTER
(SW) contacted patient's spouse, Suzy (ph#[phone]), and spoke to pt's daughter (Ann). Ann reported that her mother was packing and would be driving back to [place] with her car. Ann stated that she will be returning to [place] in December. She went on to say that they are all working together to avoid caregiver burnout. Patient's spouse will reportedly be contacting [facility] to discuss reimbursement options for patient's stay while local. Ann reported that he mother still has financial profile that needs to be completed and returned to . Caregiver denied having any further questions or concerns at this time.    SW remains available and will follow-up as appropriate.

## 2018-11-13 NOTE — PLAN OF CARE
Problem: Patient Care Overview  Goal: Plan of Care Review  Outcome: Ongoing (interventions implemented as appropriate)  Plan of care reviewed with pt and mother. Pt denies pain. OOB to chair with assistance. Diet advanced. All vitals stable. Pt on RA. Plan to re-evaluate for CRRT in am.

## 2018-11-13 NOTE — ASSESSMENT & PLAN NOTE
Managed per LTS.   avoid hypoglycemia  Lab Results   Component Value Date     (H) 11/13/2018     (H) 11/13/2018     (H) 11/13/2018    ALKPHOS 85 11/13/2018    BILITOT 10.8 (H) 11/13/2018

## 2018-11-13 NOTE — PROGRESS NOTES
"[facility]  Endocrinology  Progress Note    Admit Date: 10/27/2018     Reason for Consult: Management of Hyperglycemia     Surgical Procedure and Date: liver transplant 11/11/18    HPI:   Patient is a 53 y.o. male with a diagnosis of ESLD secondary to ETOH abuse and DEL with ESRD with RRT. Patient is now s/p liver transplant. Endocrinology consulted for BG management.        Unclear if patient has DM; mentioned briefly in chart but not under history. No DM meds on file and A1C normal. Patient intubated and no family at bedside.  Lab Results   Component Value Date    HGBA1C 4.4 11/09/2018             Interval HPI:   Overnight events: Remains in ICU, extubated yesterday evening.  Mild confusion overnight, pulling out medical devices.  Nocturnal CRRT per nephrology.  BG at or slightly above goal on q 2 hr intensive insulin protocol, rates ranging from 0.3-1.1 u/hr.  Solu medrol 80 mg BID, standard steroid taper.  Eating:   NPO  Nausea: No  Hypoglycemia and intervention: No  Fever: No  TPN and/or TF: No    /70 (BP Location: Left arm, Patient Position: Lying)   Pulse 90   Temp 97.8 °F (36.6 °C) (Oral)   Resp 20   Ht 5' 10" (1.778 m)   Wt 90 kg (198 lb 6.6 oz)   SpO2 99%   BMI 28.47 kg/m²      Labs Reviewed and Include    Recent Labs   Lab 11/13/18  0406   *  160*   CALCIUM 7.8*  7.8*   ALBUMIN 2.2*  2.2*   PROT 3.7*     137   K 4.5  4.5   CO2 25  25     103   BUN 13  13   CREATININE 1.5*  1.5*   ALKPHOS 85   *   *   BILITOT 10.8*     Lab Results   Component Value Date    WBC 13.55 (H) 11/13/2018    HGB 8.0 (L) 11/13/2018    HCT 23.3 (L) 11/13/2018    MCV 90 11/13/2018    PLT 59 (L) 11/13/2018     No results for input(s): TSH, FREET4 in the last 168 hours.  Lab Results   Component Value Date    HGBA1C 4.4 11/09/2018       Nutritional status:   Body mass index is 28.47 kg/m².  Lab Results   Component Value Date    ALBUMIN 2.2 (L) 11/13/2018    " ALBUMIN 2.2 (L) 11/13/2018    ALBUMIN 2.4 (L) 11/12/2018     Lab Results   Component Value Date    PREALBUMIN 7 (L) 10/27/2018       Estimated Creatinine Clearance: 64.3 mL/min (A) (based on SCr of 1.5 mg/dL (H)).    Accu-Checks  Recent Labs     11/12/18  1315 11/12/18  1455 11/12/18  1702 11/12/18  2005 11/12/18  2206 11/13/18  0004 11/13/18  0202 11/13/18  0405 11/13/18  0614 11/13/18  0824   POCTGLUCOSE 147* 172* 176* 152* 162* 162* 169* 173* 202* 205*       Current Medications and/or Treatments Impacting Glycemic Control  Immunotherapy:    Immunosuppressants         Stop Route Frequency     tacrolimus (PROGRAF) 1 mg/mL oral syringe      -- Oral 2 times daily     mycophenolate mofetil 200 mg/mL suspension 1,000 mg      -- Oral 2 times daily        Steroids:   Hormones (From admission, onward)    Start     Stop Route Frequency Ordered    11/17/18 0900  predniSONE tablet 20 mg  (methylprednisolone taper panel)      -- Oral Daily 11/11/18 1208    11/16/18 0900  methylPREDNISolone sodium succinate injection 20 mg  (methylprednisolone taper panel)      11/17 0859 IV Every 12 hours 11/11/18 1208    11/15/18 0900  methylPREDNISolone sodium succinate injection 40 mg  (methylprednisolone taper panel)      11/16 0859 IV Every 12 hours 11/11/18 1208    11/14/18 0900  methylPREDNISolone sodium succinate injection 60 mg  (methylprednisolone taper panel)      11/15 0859 IV Every 12 hours 11/11/18 1208    11/13/18 0900  methylPREDNISolone sodium succinate injection 80 mg  (methylprednisolone taper panel)      11/14 0859 IV Every 12 hours 11/11/18 1208    11/11/18 1445  vasopressin (PITRESSIN) 0.2 Units/mL in dextrose 5 % 100 mL infusion      -- IV Continuous 11/11/18 1337    11/09/18 1345  methylPREDNISolone sodium succinate injection 500 mg  (Med - Immunosuppression Induction Therapy (Methylprednisolone))      11/10 0144 IV Once pre-op 11/09/18 1236        Pressors:    Autonomic Drugs (From admission, onward)    Start     Stop  Route Frequency Ordered    11/11/18 1335  norepinephrine 4 mg in dextrose 5% 250 mL infusion (premix) (titrating)     Question Answer Comment   Titrate by: (in mcg/kg/min) 0.01    Titrate interval: (in minutes) 2    Titrate to maintain: (MAP or SBP) MAP    Greater than: (in mmHg) 65    Maximum dose: (in mcg/kg/min) 2        -- IV Continuous 11/11/18 1337        Hyperglycemia/Diabetes Medications:   Antihyperglycemics (From admission, onward)    Start     Stop Route Frequency Ordered    11/11/18 1415  insulin regular (Humulin R) 100 Units in sodium chloride 0.9% 100 mL infusion     Question:  Insulin Rate Adjustment (DO NOT MODIFY ANSWER)  Answer:  [email or web address] [id number]    -- IV Continuous 11/11/18 1313          ASSESSMENT and PLAN    * S/P liver transplant    Managed per LTS.   avoid hypoglycemia  Lab Results   Component Value Date     (H) 11/13/2018     (H) 11/13/2018     (H) 11/13/2018    ALKPHOS 85 11/13/2018    BILITOT 10.8 (H) 11/13/2018            Type 2 diabetes mellitus without complication    BG goal 140-180    Discontinue intensive insulin protocol  Transition IV insulin infusion at 0.5 u/hr  Add Novolog 2 units with meals  Low dose corrections scale  Change BG monitoring to /HS/0200    Discharge planning: TBD       Adrenal cortical steroids causing adverse effect in therapeutic use    On standard steroid taper per transplant team; may elevate BG readings         DEL (acute kidney injury)    Avoid insulin stacking and hypoglycemia.  Lab Results   Component Value Date    CREATININE 2.1 (H) 11/13/2018            Hepatorenal syndrome    Avoid insulin stacking and hypoglycemia.         Prophylactic immunotherapy    May increase insulin resistance.        Overweight (BMI 25.0-29.9)    may contribute to insulin resistance  Body mass index is 28.47 kg/m².             Zoran Almeida NP  Endocrinology  [facility]  [place]

## 2018-11-13 NOTE — ASSESSMENT & PLAN NOTE
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 POD#2    Neuro  -extubated and off sedation    -PRN pain meds   -monitor neuro exam off sedation     CV  -off pressor support   -swan tricia catheter has been removed   -monitor on telemetry     Resp  -extubated yesterday w/o issues   -continue to monitor O2 sats     Heme  -f/u AM CBC, stable at this time   -transfuse as needed      ID  -monitor fever and WBC     MSK  -OOBTC  -ambulation as tolerated      FEN/GI  -replace lytes   -Diabetic diet     Endocrine  -insulin as needed. Monitor BG     dispo  -continue ICU care. Hopefully transfer to TSU tomorrow

## 2018-11-14 PROBLEM — R41.0 DELIRIUM: Status: ACTIVE | Noted: 2018-11-14

## 2018-11-14 LAB
ABO + RH BLD: NORMAL
ALBUMIN SERPL BCP-MCNC: 2.3 G/DL
ALBUMIN SERPL BCP-MCNC: 2.3 G/DL
ALBUMIN SERPL BCP-MCNC: 2.4 G/DL
ALBUMIN SERPL BCP-MCNC: 2.4 G/DL
ALP SERPL-CCNC: 137 U/L
ALT SERPL W/O P-5'-P-CCNC: 252 U/L
ANION GAP SERPL CALC-SCNC: 11 MMOL/L
ANION GAP SERPL CALC-SCNC: 7 MMOL/L
ANION GAP SERPL CALC-SCNC: 7 MMOL/L
ANION GAP SERPL CALC-SCNC: 9 MMOL/L
APTT BLDCRRT: 29.3 SEC
AST SERPL-CCNC: 86 U/L
BACTERIA SPEC AEROBE CULT: NO GROWTH
BACTERIA SPEC ANAEROBE CULT: NORMAL
BASOPHILS # BLD AUTO: 0 K/UL
BASOPHILS # BLD AUTO: 0.01 K/UL
BASOPHILS # BLD AUTO: 0.01 K/UL
BASOPHILS # BLD AUTO: 0.02 K/UL
BASOPHILS NFR BLD: 0 %
BASOPHILS NFR BLD: 0.1 %
BILIRUB DIRECT SERPL-MCNC: 7.7 MG/DL
BILIRUB SERPL-MCNC: 10.2 MG/DL
BLD GP AB SCN CELLS X3 SERPL QL: NORMAL
BLD PROD TYP BPU: NORMAL
BLOOD UNIT EXPIRATION DATE: NORMAL
BLOOD UNIT TYPE CODE: 5100
BLOOD UNIT TYPE: NORMAL
BUN SERPL-MCNC: 31 MG/DL
BUN SERPL-MCNC: 31 MG/DL
BUN SERPL-MCNC: 44 MG/DL
BUN SERPL-MCNC: 51 MG/DL
CALCIUM SERPL-MCNC: 7.8 MG/DL
CALCIUM SERPL-MCNC: 8 MG/DL
CHLORIDE SERPL-SCNC: 103 MMOL/L
CHLORIDE SERPL-SCNC: 103 MMOL/L
CHLORIDE SERPL-SCNC: 99 MMOL/L
CHLORIDE SERPL-SCNC: 99 MMOL/L
CO2 SERPL-SCNC: 22 MMOL/L
CO2 SERPL-SCNC: 23 MMOL/L
CO2 SERPL-SCNC: 26 MMOL/L
CO2 SERPL-SCNC: 26 MMOL/L
CODING SYSTEM: NORMAL
CREAT SERPL-MCNC: 2.1 MG/DL
CREAT SERPL-MCNC: 2.1 MG/DL
CREAT SERPL-MCNC: 3 MG/DL
CREAT SERPL-MCNC: 3.3 MG/DL
DIFFERENTIAL METHOD: ABNORMAL
DISPENSE STATUS: NORMAL
EOSINOPHIL # BLD AUTO: 0.1 K/UL
EOSINOPHIL NFR BLD: 0.4 %
EOSINOPHIL NFR BLD: 0.5 %
EOSINOPHIL NFR BLD: 1.1 %
EOSINOPHIL NFR BLD: 1.2 %
ERYTHROCYTE [DISTWIDTH] IN BLOOD BY AUTOMATED COUNT: 14.4 %
ERYTHROCYTE [DISTWIDTH] IN BLOOD BY AUTOMATED COUNT: 15 %
ERYTHROCYTE [DISTWIDTH] IN BLOOD BY AUTOMATED COUNT: 15.4 %
ERYTHROCYTE [DISTWIDTH] IN BLOOD BY AUTOMATED COUNT: 15.5 %
EST. GFR  (AFRICAN AMERICAN): 23.4 ML/MIN/1.73 M^2
EST. GFR  (AFRICAN AMERICAN): 26.2 ML/MIN/1.73 M^2
EST. GFR  (AFRICAN AMERICAN): 40.3 ML/MIN/1.73 M^2
EST. GFR  (AFRICAN AMERICAN): 40.3 ML/MIN/1.73 M^2
EST. GFR  (NON AFRICAN AMERICAN): 20.2 ML/MIN/1.73 M^2
EST. GFR  (NON AFRICAN AMERICAN): 22.7 ML/MIN/1.73 M^2
EST. GFR  (NON AFRICAN AMERICAN): 34.9 ML/MIN/1.73 M^2
EST. GFR  (NON AFRICAN AMERICAN): 34.9 ML/MIN/1.73 M^2
GGT SERPL-CCNC: 352 U/L
GLUCOSE SERPL-MCNC: 162 MG/DL
GLUCOSE SERPL-MCNC: 162 MG/DL
GLUCOSE SERPL-MCNC: 323 MG/DL
GLUCOSE SERPL-MCNC: 349 MG/DL
HCT VFR BLD AUTO: 17.7 %
HCT VFR BLD AUTO: 17.9 %
HCT VFR BLD AUTO: 21.8 %
HCT VFR BLD AUTO: 23.7 %
HGB BLD-MCNC: 6 G/DL
HGB BLD-MCNC: 6 G/DL
HGB BLD-MCNC: 7.3 G/DL
HGB BLD-MCNC: 7.9 G/DL
IMM GRANULOCYTES # BLD AUTO: 0.11 K/UL
IMM GRANULOCYTES # BLD AUTO: 0.11 K/UL
IMM GRANULOCYTES # BLD AUTO: 0.29 K/UL
IMM GRANULOCYTES # BLD AUTO: 0.49 K/UL
IMM GRANULOCYTES NFR BLD AUTO: 1.4 %
IMM GRANULOCYTES NFR BLD AUTO: 1.4 %
IMM GRANULOCYTES NFR BLD AUTO: 2.2 %
IMM GRANULOCYTES NFR BLD AUTO: 2.5 %
LYMPHOCYTES # BLD AUTO: 0.8 K/UL
LYMPHOCYTES # BLD AUTO: 0.8 K/UL
LYMPHOCYTES # BLD AUTO: 1 K/UL
LYMPHOCYTES # BLD AUTO: 1.1 K/UL
LYMPHOCYTES NFR BLD: 10.1 %
LYMPHOCYTES NFR BLD: 10.7 %
LYMPHOCYTES NFR BLD: 5.5 %
LYMPHOCYTES NFR BLD: 7.3 %
MAGNESIUM SERPL-MCNC: 2.2 MG/DL
MAGNESIUM SERPL-MCNC: 2.2 MG/DL
MAGNESIUM SERPL-MCNC: 2.4 MG/DL
MAGNESIUM SERPL-MCNC: 2.5 MG/DL
MCH RBC QN AUTO: 30.4 PG
MCH RBC QN AUTO: 30.4 PG
MCH RBC QN AUTO: 31.9 PG
MCH RBC QN AUTO: 32.3 PG
MCHC RBC AUTO-ENTMCNC: 33.3 G/DL
MCHC RBC AUTO-ENTMCNC: 33.5 G/DL
MCHC RBC AUTO-ENTMCNC: 33.5 G/DL
MCHC RBC AUTO-ENTMCNC: 33.9 G/DL
MCV RBC AUTO: 91 FL
MCV RBC AUTO: 91 FL
MCV RBC AUTO: 95 FL
MCV RBC AUTO: 95 FL
MONOCYTES # BLD AUTO: 0.7 K/UL
MONOCYTES # BLD AUTO: 0.8 K/UL
MONOCYTES # BLD AUTO: 0.9 K/UL
MONOCYTES # BLD AUTO: 1.6 K/UL
MONOCYTES NFR BLD: 6.8 %
MONOCYTES NFR BLD: 7.9 %
MONOCYTES NFR BLD: 8.8 %
MONOCYTES NFR BLD: 9.3 %
NEUTROPHILS # BLD AUTO: 10.9 K/UL
NEUTROPHILS # BLD AUTO: 16.6 K/UL
NEUTROPHILS # BLD AUTO: 5.9 K/UL
NEUTROPHILS # BLD AUTO: 6.3 K/UL
NEUTROPHILS NFR BLD: 77.9 %
NEUTROPHILS NFR BLD: 78 %
NEUTROPHILS NFR BLD: 83.1 %
NEUTROPHILS NFR BLD: 83.6 %
NRBC BLD-RTO: 0 /100 WBC
PHOSPHATE SERPL-MCNC: 3.4 MG/DL
PHOSPHATE SERPL-MCNC: 3.4 MG/DL
PHOSPHATE SERPL-MCNC: 4.6 MG/DL
PHOSPHATE SERPL-MCNC: 5 MG/DL
PLATELET # BLD AUTO: 100 K/UL
PLATELET # BLD AUTO: 49 K/UL
PLATELET # BLD AUTO: 52 K/UL
PLATELET # BLD AUTO: 55 K/UL
PMV BLD AUTO: 11.5 FL
PMV BLD AUTO: 11.5 FL
PMV BLD AUTO: 11.6 FL
PMV BLD AUTO: 11.8 FL
POCT GLUCOSE: 167 MG/DL (ref 70–110)
POCT GLUCOSE: 172 MG/DL (ref 70–110)
POCT GLUCOSE: 249 MG/DL (ref 70–110)
POCT GLUCOSE: 304 MG/DL (ref 70–110)
POCT GLUCOSE: 309 MG/DL (ref 70–110)
POCT GLUCOSE: 336 MG/DL (ref 70–110)
POTASSIUM SERPL-SCNC: 4.8 MMOL/L
POTASSIUM SERPL-SCNC: 5.2 MMOL/L
PROT SERPL-MCNC: 4 G/DL
RBC # BLD AUTO: 1.86 M/UL
RBC # BLD AUTO: 1.88 M/UL
RBC # BLD AUTO: 2.4 M/UL
RBC # BLD AUTO: 2.6 M/UL
SODIUM SERPL-SCNC: 131 MMOL/L
SODIUM SERPL-SCNC: 132 MMOL/L
SODIUM SERPL-SCNC: 136 MMOL/L
SODIUM SERPL-SCNC: 136 MMOL/L
TRANS ERYTHROCYTES VOL PATIENT: NORMAL ML
WBC # BLD AUTO: 13.06 K/UL
WBC # BLD AUTO: 19.84 K/UL
WBC # BLD AUTO: 7.59 K/UL
WBC # BLD AUTO: 8.05 K/UL

## 2018-11-14 PROCEDURE — 63600175 PHARM REV CODE 636 W HCPCS: Performed by: STUDENT IN AN ORGANIZED HEALTH CARE EDUCATION/TRAINING PROGRAM

## 2018-11-14 PROCEDURE — P9021 RED BLOOD CELLS UNIT: HCPCS

## 2018-11-14 PROCEDURE — 83735 ASSAY OF MAGNESIUM: CPT | Mod: 91

## 2018-11-14 PROCEDURE — 82248 BILIRUBIN DIRECT: CPT

## 2018-11-14 PROCEDURE — 80069 RENAL FUNCTION PANEL: CPT

## 2018-11-14 PROCEDURE — 99232 SBSQ HOSP IP/OBS MODERATE 35: CPT | Mod: ,,, | Performed by: INTERNAL MEDICINE

## 2018-11-14 PROCEDURE — 84100 ASSAY OF PHOSPHORUS: CPT

## 2018-11-14 PROCEDURE — 99232 PR SUBSEQUENT HOSPITAL CARE,LEVL II: ICD-10-PCS | Mod: 24,,, | Performed by: SURGERY

## 2018-11-14 PROCEDURE — 99255 IP/OBS CONSLTJ NEW/EST HI 80: CPT | Mod: ,,, | Performed by: INTERNAL MEDICINE

## 2018-11-14 PROCEDURE — 87040 BLOOD CULTURE FOR BACTERIA: CPT

## 2018-11-14 PROCEDURE — 63600175 PHARM REV CODE 636 W HCPCS

## 2018-11-14 PROCEDURE — 80053 COMPREHEN METABOLIC PANEL: CPT

## 2018-11-14 PROCEDURE — 90945 DIALYSIS ONE EVALUATION: CPT

## 2018-11-14 PROCEDURE — 25000003 PHARM REV CODE 250: Performed by: TRANSPLANT SURGERY

## 2018-11-14 PROCEDURE — 86920 COMPATIBILITY TEST SPIN: CPT

## 2018-11-14 PROCEDURE — 99232 PR SUBSEQUENT HOSPITAL CARE,LEVL II: ICD-10-PCS | Mod: ,,, | Performed by: NURSE PRACTITIONER

## 2018-11-14 PROCEDURE — 82977 ASSAY OF GGT: CPT

## 2018-11-14 PROCEDURE — 99255 PR INITIAL INPATIENT CONSULT,LEVL V: ICD-10-PCS | Mod: ,,, | Performed by: INTERNAL MEDICINE

## 2018-11-14 PROCEDURE — 85025 COMPLETE CBC W/AUTO DIFF WBC: CPT | Mod: 91

## 2018-11-14 PROCEDURE — 20000000 HC ICU ROOM

## 2018-11-14 PROCEDURE — S0028 INJECTION, FAMOTIDINE, 20 MG: HCPCS | Performed by: TRANSPLANT SURGERY

## 2018-11-14 PROCEDURE — 99232 PR SUBSEQUENT HOSPITAL CARE,LEVL II: ICD-10-PCS | Mod: ,,, | Performed by: INTERNAL MEDICINE

## 2018-11-14 PROCEDURE — 86901 BLOOD TYPING SEROLOGIC RH(D): CPT

## 2018-11-14 PROCEDURE — 63600175 PHARM REV CODE 636 W HCPCS: Performed by: TRANSPLANT SURGERY

## 2018-11-14 PROCEDURE — 25000003 PHARM REV CODE 250: Performed by: NURSE PRACTITIONER

## 2018-11-14 PROCEDURE — 25000003 PHARM REV CODE 250: Performed by: HOSPITALIST

## 2018-11-14 PROCEDURE — 99232 SBSQ HOSP IP/OBS MODERATE 35: CPT | Mod: ,,, | Performed by: NURSE PRACTITIONER

## 2018-11-14 PROCEDURE — 85730 THROMBOPLASTIN TIME PARTIAL: CPT

## 2018-11-14 PROCEDURE — 25000003 PHARM REV CODE 250: Performed by: PEDIATRICS

## 2018-11-14 PROCEDURE — 99232 SBSQ HOSP IP/OBS MODERATE 35: CPT | Mod: 24,,, | Performed by: SURGERY

## 2018-11-14 RX ORDER — HYDROCODONE BITARTRATE AND ACETAMINOPHEN 500; 5 MG/1; MG/1
TABLET ORAL CONTINUOUS
Status: ACTIVE | OUTPATIENT
Start: 2018-11-14 | End: 2018-11-15

## 2018-11-14 RX ORDER — MAGNESIUM SULFATE HEPTAHYDRATE 40 MG/ML
2 INJECTION, SOLUTION INTRAVENOUS
Status: ACTIVE | OUTPATIENT
Start: 2018-11-14 | End: 2018-11-15

## 2018-11-14 RX ORDER — HYDROCODONE BITARTRATE AND ACETAMINOPHEN 500; 5 MG/1; MG/1
TABLET ORAL CONTINUOUS
Status: DISCONTINUED | OUTPATIENT
Start: 2018-11-14 | End: 2018-11-14

## 2018-11-14 RX ORDER — HALOPERIDOL 5 MG/ML
INJECTION INTRAMUSCULAR
Status: DISPENSED
Start: 2018-11-14 | End: 2018-11-14

## 2018-11-14 RX ORDER — HYDROCODONE BITARTRATE AND ACETAMINOPHEN 500; 5 MG/1; MG/1
TABLET ORAL
Status: DISCONTINUED | OUTPATIENT
Start: 2018-11-14 | End: 2018-11-16

## 2018-11-14 RX ORDER — LORAZEPAM 2 MG/ML
2 INJECTION INTRAMUSCULAR ONCE
Status: COMPLETED | OUTPATIENT
Start: 2018-11-14 | End: 2018-11-14

## 2018-11-14 RX ORDER — OLANZAPINE 5 MG/1
10 TABLET, ORALLY DISINTEGRATING ORAL ONCE
Status: CANCELLED | OUTPATIENT
Start: 2018-11-14

## 2018-11-14 RX ORDER — HALOPERIDOL 5 MG/ML
5 INJECTION INTRAMUSCULAR ONCE
Status: COMPLETED | OUTPATIENT
Start: 2018-11-14 | End: 2018-11-14

## 2018-11-14 RX ORDER — MAGNESIUM SULFATE HEPTAHYDRATE 40 MG/ML
2 INJECTION, SOLUTION INTRAVENOUS
Status: DISCONTINUED | OUTPATIENT
Start: 2018-11-14 | End: 2018-11-14

## 2018-11-14 RX ORDER — HALOPERIDOL 5 MG/ML
INJECTION INTRAMUSCULAR
Status: COMPLETED
Start: 2018-11-14 | End: 2018-11-14

## 2018-11-14 RX ORDER — INSULIN ASPART 100 [IU]/ML
2-4 INJECTION, SOLUTION INTRAVENOUS; SUBCUTANEOUS
Status: DISCONTINUED | OUTPATIENT
Start: 2018-11-14 | End: 2018-11-14

## 2018-11-14 RX ADMIN — FUROSEMIDE 40 MG: 10 INJECTION, SOLUTION INTRAMUSCULAR; INTRAVENOUS at 08:11

## 2018-11-14 RX ADMIN — DEXTROSE 1000 MG: 5 SOLUTION INTRAVENOUS at 11:11

## 2018-11-14 RX ADMIN — METHYLPREDNISOLONE SODIUM SUCCINATE 60 MG: 125 INJECTION, POWDER, FOR SOLUTION INTRAMUSCULAR; INTRAVENOUS at 08:11

## 2018-11-14 RX ADMIN — Medication 1000 MG: at 12:11

## 2018-11-14 RX ADMIN — FAMOTIDINE 20 MG: 10 INJECTION, SOLUTION INTRAVENOUS at 08:11

## 2018-11-14 RX ADMIN — HALOPERIDOL 5 MG: 5 INJECTION INTRAMUSCULAR at 10:11

## 2018-11-14 RX ADMIN — HEPARIN SODIUM 5000 UNITS: 5000 INJECTION, SOLUTION INTRAVENOUS; SUBCUTANEOUS at 05:11

## 2018-11-14 RX ADMIN — METHYLPREDNISOLONE SODIUM SUCCINATE 60 MG: 125 INJECTION, POWDER, FOR SOLUTION INTRAMUSCULAR; INTRAVENOUS at 10:11

## 2018-11-14 RX ADMIN — LORAZEPAM 2 MG: 2 INJECTION INTRAMUSCULAR; INTRAVENOUS at 04:11

## 2018-11-14 RX ADMIN — LORAZEPAM 2 MG: 2 INJECTION INTRAMUSCULAR; INTRAVENOUS at 08:11

## 2018-11-14 RX ADMIN — CIPROFLOXACIN 400 MG: 2 INJECTION, SOLUTION INTRAVENOUS at 04:11

## 2018-11-14 RX ADMIN — LORAZEPAM 2 MG: 2 INJECTION INTRAMUSCULAR; INTRAVENOUS at 11:11

## 2018-11-14 RX ADMIN — INSULIN ASPART 3 UNITS: 100 INJECTION, SOLUTION INTRAVENOUS; SUBCUTANEOUS at 07:11

## 2018-11-14 RX ADMIN — HALOPERIDOL LACTATE 5 MG: 5 INJECTION, SOLUTION INTRAMUSCULAR at 11:11

## 2018-11-14 RX ADMIN — Medication 2 MG: at 08:11

## 2018-11-14 RX ADMIN — INSULIN ASPART 3 UNITS: 100 INJECTION, SOLUTION INTRAVENOUS; SUBCUTANEOUS at 03:11

## 2018-11-14 RX ADMIN — SODIUM CHLORIDE: 0.9 INJECTION, SOLUTION INTRAVENOUS at 10:11

## 2018-11-14 RX ADMIN — HALOPERIDOL LACTATE 5 MG: 5 INJECTION, SOLUTION INTRAMUSCULAR at 10:11

## 2018-11-14 RX ADMIN — HEPARIN SODIUM 5000 UNITS: 5000 INJECTION, SOLUTION INTRAVENOUS; SUBCUTANEOUS at 12:11

## 2018-11-14 RX ADMIN — FLUCONAZOLE IN SODIUM CHLORIDE 200 MG: 2 INJECTION, SOLUTION INTRAVENOUS at 05:11

## 2018-11-14 NOTE — ASSESSMENT & PLAN NOTE
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 POD#2    Neuro  -acute change in mental status with confusion and agitation this AM   -will add haldol for agitation and ativan   -sending blood cultures and urine cultures for evaluation     CV  -off pressor support   -swan tricia catheter has been removed   -monitor on telemetry     Resp  -extubated yesterday w/o issues   -continue to monitor O2 sats     Heme  -H/H drop to 6.0 and 17  -transfuse 2u prbc   -liver ultrasound     ID  -monitor fever and WBC   -f/u cultures     MSK  -OOBTC  -ambulation as tolerated      FEN/GI  -replace lytes   -Diabetic diet     Endocrine  -insulin as needed. Monitor BG       -CRRT per renal     dispo  -continue ICU care.

## 2018-11-14 NOTE — NURSING
Pt refusing blood sugar checks. Insulin gtt currently at 1, notified endocrinology, will change dose to 0.5 without sugar checks and notify team if pt no longer refuses.

## 2018-11-14 NOTE — SUBJECTIVE & OBJECTIVE
Interval History:   NAEON, off SLED overnight. This am he is more confused and disoriented. Oxygenation remains stable on RA. CVP stable CVP 5. Hb stable this am no plans for transfusion. Remains anuric. Off pressors. FLuid balance is Net gain 1559 ml. Hg dropped to 6.0 today suspect bleeding US confirms fluid collection suspicious for hematoma. Transfused 3 units of PRBC's.  Review of patient's allergies indicates:   Allergen Reactions    Penicillins Nausea And Vomiting and Rash     Current Facility-Administered Medications   Medication Frequency    0.9%  NaCl infusion (CRRT USE ONLY) Continuous    0.9%  NaCl infusion (for blood administration) Q24H PRN    0.9%  NaCl infusion (for blood administration) Q24H PRN    0.9%  NaCl infusion (for blood administration) Q24H PRN    albumin human 5% bottle 25 g Once    albumin human 5% bottle 25 g Once    dextrose 50% injection 12.5 g PRN    dextrose 50% injection 25 g PRN    famotidine (PF) injection 20 mg Daily    fluconazole (DIFLUCAN) IVPB 200 mg 100 mL Q24H    haloperidol lactate (HALDOL) 5 mg/mL injection     heparin (porcine) injection 5,000 Units Q8H    insulin regular (Humulin R) 100 Units in sodium chloride 0.9% 100 mL infusion Continuous    magnesium sulfate 2g in water 50mL IVPB (premix) PRN    methylPREDNISolone sodium succinate injection 60 mg Q12H    Followed by    [START ON 11/15/2018] methylPREDNISolone sodium succinate injection 40 mg Q12H    Followed by    [START ON 11/16/2018] methylPREDNISolone sodium succinate injection 20 mg Q12H    Followed by    [START ON 11/17/2018] predniSONE tablet 20 mg Daily    mycophenolate (CELLCEPT) 1,000 mg in dextrose 5 % 250 mL IVPB Daily    norepinephrine 4 mg in dextrose 5% 250 mL infusion (premix) (titrating) Continuous    oxyCODONE immediate release tablet 5 mg Q6H PRN    oxyCODONE immediate release tablet Tab 10 mg Q4H PRN    propofol (DIPRIVAN) 10 mg/mL infusion Continuous    ramelteon tablet  8 mg Nightly PRN    sodium phosphate 20.01 mmol in dextrose 5 % 250 mL IVPB PRN    sodium phosphate 30 mmol in dextrose 5 % 250 mL IVPB PRN    sodium phosphate 39.99 mmol in dextrose 5 % 250 mL IVPB PRN    [START ON 11/21/2018] valganciclovir 50 mg/ml oral solution 450 mg Daily    vasopressin (PITRESSIN) 0.2 Units/mL in dextrose 5 % 100 mL infusion Continuous       Objective:     Vital Signs (Most Recent):  Temp: 97.5 °F (36.4 °C) (11/14/18 1617)  Pulse: 96 (11/14/18 1700)  Resp: (!) 25 (11/14/18 1700)  BP: 100/68 (11/14/18 1500)  SpO2: 98 % (11/14/18 1700)  O2 Device (Oxygen Therapy): room air (11/14/18 0600) Vital Signs (24h Range):  Temp:  [97.5 °F (36.4 °C)-98.5 °F (36.9 °C)] 97.5 °F (36.4 °C)  Pulse:  [] 96  Resp:  [8-47] 25  SpO2:  [93 %-99 %] 98 %  BP: ()/(56-68) 100/68  Arterial Line BP: ()/(46-71) 109/61     Weight: 92 kg (202 lb 13.2 oz) (11/14/18 0500)  Body mass index is 29.1 kg/m².  Body surface area is 2.13 meters squared.    I/O last 3 completed shifts:  In: 4504.8 [P.O.:890; I.V.:3299.8; Blood:315]  Out: 3640 [Urine:5; Drains:624; Other:3011]    Physical Exam   Constitutional: He appears well-developed. He appears cachectic. He is cooperative. No distress. Nasal cannula in place.   AAOx3   HENT:   Head: Normocephalic and atraumatic.   Eyes: Conjunctivae are normal. Pupils are equal, round, and reactive to light.   Neck: Trachea normal. Neck supple. No JVD present.   Cardiovascular: Normal rate, regular rhythm, S1 normal, S2 normal and normal pulses. Exam reveals no gallop and no friction rub.   No murmur heard.  Pulmonary/Chest: Effort normal. He has decreased breath sounds in the right lower field and the left lower field. He has no rhonchi.   Abdominal: Soft. He exhibits distension. He exhibits no ascites. Bowel sounds are decreased. There is no tenderness.       Musculoskeletal: Normal range of motion. He exhibits edema (edema+ trace pitting sacral).   Neurological:   Awake  alert and Oriented x 3   Skin: Skin is warm and dry. Capillary refill takes less than 2 seconds.   Psychiatric: He has a normal mood and affect. His behavior is normal.   Vitals reviewed.      Significant Labs:  ABGs:   Recent Labs   Lab 11/13/18  0453   PH 7.492*   PCO2 34.5*   HCO3 26.4   POCSATURATED 93*   BE 3     BMP:   Recent Labs   Lab 11/14/18  1408   *   CL 99   CO2 22*   BUN 51*   CREATININE 3.3*   CALCIUM 7.8*   MG 2.4     Cardiac Markers: No results for input(s): CKMB, TROPONINT, MYOGLOBIN in the last 168 hours.  CBC:   Recent Labs   Lab 11/14/18  1408   WBC 19.84*   RBC 2.40*   HGB 7.3*   HCT 21.8*   *   MCV 91   MCH 30.4   MCHC 33.5     CMP:   Recent Labs   Lab 11/14/18  0550 11/14/18  1408   * 349*   CALCIUM 8.0* 7.8*   ALBUMIN 2.3* 2.3*   PROT 4.0*  --    * 132*   K 4.8 5.2*   CO2 23 22*   CL 99 99   BUN 44* 51*   CREATININE 3.0* 3.3*   ALKPHOS 137*  --    *  --    AST 86*  --    BILITOT 10.2*  --      Coagulation:   Recent Labs   Lab 11/13/18  0406 11/14/18  0550   INR 1.5*  --    APTT 37.7* 29.3     Recent Labs   Lab 11/09/18  1737   COLORU Green*   SPECGRAV 1.025   PHUR 6.5   PROTEINUA 2+*   BACTERIA Occasional   NITRITE Negative   LEUKOCYTESUR Trace*   HYALINECASTS 0     All labs within the past 24 hours have been reviewed.     Significant Imaging:  Labs: Reviewed  US: Reviewed

## 2018-11-14 NOTE — SUBJECTIVE & OBJECTIVE
"Interval HPI:   Overnight events: Remains in ICU, mild confusion overnight.  BG trending up overnight on transition IV insulin infusion at 0.5 u/hr.  Solu medrol 60 mg BID, standard steroid taper.  On IV antibiotics.  Of note, low H&H this am of 6/17.  Patient refusing all treatment at this time.  Eating:   <25%  Nausea: No  Hypoglycemia and intervention: No  Fever: No  TPN and/or TF: No    BP (P) 96/64   Pulse 99   Temp 98.3 °F (36.8 °C) (Oral)   Resp (!) 29   Ht 5' 10" (1.778 m)   Wt 92 kg (202 lb 13.2 oz)   SpO2 97%   BMI 29.10 kg/m²     Labs Reviewed and Include    Recent Labs   Lab 11/14/18  0550   *   CALCIUM 8.0*   ALBUMIN 2.3*   PROT 4.0*   *   K 4.8   CO2 23   CL 99   BUN 44*   CREATININE 3.0*   ALKPHOS 137*   *   AST 86*   BILITOT 10.2*     Lab Results   Component Value Date    WBC 8.05 11/14/2018    HGB 6.0 (L) 11/14/2018    HCT 17.7 (LL) 11/14/2018    MCV 95 11/14/2018    PLT 55 (L) 11/14/2018     No results for input(s): TSH, FREET4 in the last 168 hours.  Lab Results   Component Value Date    HGBA1C 4.4 11/09/2018       Nutritional status:   Body mass index is 29.1 kg/m².  Lab Results   Component Value Date    ALBUMIN 2.3 (L) 11/14/2018    ALBUMIN 2.7 (L) 11/13/2018    ALBUMIN 2.6 (L) 11/13/2018     Lab Results   Component Value Date    PREALBUMIN 7 (L) 10/27/2018       Estimated Creatinine Clearance: 32.5 mL/min (A) (based on SCr of 3 mg/dL (H)).    Accu-Checks  Recent Labs     11/12/18  2206 11/13/18  0004 11/13/18  0202 11/13/18  0405 11/13/18  0614 11/13/18  0824 11/13/18  1259 11/13/18  1834 11/13/18  2222 11/14/18  0340   POCTGLUCOSE 162* 162* 169* 173* 202* 205* 207* 284* 281* 309*       Current Medications and/or Treatments Impacting Glycemic Control  Immunotherapy:    Immunosuppressants         Stop Route Frequency     tacrolimus (PROGRAF) 1 mg/mL oral syringe      -- Oral 2 times daily     mycophenolate mofetil 200 mg/mL suspension 1,000 mg      -- Oral 2 times " daily        Steroids:   Hormones (From admission, onward)    Start     Stop Route Frequency Ordered    11/17/18 0900  predniSONE tablet 20 mg  (methylprednisolone taper panel)      -- Oral Daily 11/11/18 1208    11/16/18 0900  methylPREDNISolone sodium succinate injection 20 mg  (methylprednisolone taper panel)      11/17 0859 IV Every 12 hours 11/11/18 1208    11/15/18 0900  methylPREDNISolone sodium succinate injection 40 mg  (methylprednisolone taper panel)      11/16 0859 IV Every 12 hours 11/11/18 1208    11/14/18 0900  methylPREDNISolone sodium succinate injection 60 mg  (methylprednisolone taper panel)      11/15 0859 IV Every 12 hours 11/11/18 1208    11/11/18 1445  vasopressin (PITRESSIN) 0.2 Units/mL in dextrose 5 % 100 mL infusion      -- IV Continuous 11/11/18 1337    11/09/18 1345  methylPREDNISolone sodium succinate injection 500 mg  (Med - Immunosuppression Induction Therapy (Methylprednisolone))      11/10 0144 IV Once pre-op 11/09/18 1236        Pressors:    Autonomic Drugs (From admission, onward)    Start     Stop Route Frequency Ordered    11/11/18 1335  norepinephrine 4 mg in dextrose 5% 250 mL infusion (premix) (titrating)     Question Answer Comment   Titrate by: (in mcg/kg/min) 0.01    Titrate interval: (in minutes) 2    Titrate to maintain: (MAP or SBP) MAP    Greater than: (in mmHg) 65    Maximum dose: (in mcg/kg/min) 2        -- IV Continuous 11/11/18 1337        Hyperglycemia/Diabetes Medications:   Antihyperglycemics (From admission, onward)    Start     Stop Route Frequency Ordered    11/14/18 1130  insulin aspart U-100 pen 2-4 Units      -- SubQ 3 times daily with meals 11/14/18 0716    11/13/18 1100  insulin regular (Humulin R) 100 Units in sodium chloride 0.9% 100 mL infusion      -- IV Continuous 11/13/18 0954    11/13/18 1054  insulin aspart U-100 pen 0-4 Units      -- SubQ As needed (PRN) 11/13/18 0954

## 2018-11-14 NOTE — SUBJECTIVE & OBJECTIVE
Scheduled Meds:   albumin human 5%  25 g Intravenous Once    albumin human 5%  25 g Intravenous Once    ciprofloxacin  400 mg Intravenous Q12H    famotidine (PF)  20 mg Intravenous Daily    fluconazole (DIFLUCAN) IVPB  200 mg Intravenous Q24H    heparin (porcine)  5,000 Units Subcutaneous Q8H    insulin aspart U-100  2-4 Units Subcutaneous TIDWM    methylPREDNISolone sodium succinate  60 mg Intravenous Q12H    Followed by    [START ON 11/15/2018] methylPREDNISolone sodium succinate  40 mg Intravenous Q12H    Followed by    [START ON 11/16/2018] methylPREDNISolone sodium succinate  20 mg Intravenous Q12H    Followed by    [START ON 11/17/2018] predniSONE  20 mg Oral Daily    mycophenolate (CELLCEPT) IVPB  1,000 mg Intravenous Daily    [START ON 11/21/2018] valganciclovir 50 mg/ml  450 mg Per NG tube Daily     Continuous Infusions:   insulin (HUMAN R) infusion (adults) 1 Units/hr (11/14/18 0828)    norepinephrine bitartrate-D5W 0.02 mcg/kg/min (11/14/18 0900)    propofol Stopped (11/12/18 1500)    vasopressin (PITRESSIN) infusion Stopped (11/11/18 1600)     PRN Meds:sodium chloride, sodium chloride, dextrose 50%, dextrose 50%, glucagon (human recombinant), glucose, glucose, insulin aspart U-100, oxyCODONE, oxyCODONE, ramelteon    Review of Systems  Objective:     Vital Signs (Most Recent):  Temp: 98.3 °F (36.8 °C) (11/14/18 0701)  Pulse: 106 (11/14/18 0900)  Resp: (!) 27 (11/14/18 0900)  BP: 96/64 (11/14/18 0836)  SpO2: 98 % (11/14/18 0900) Vital Signs (24h Range):  Temp:  [97.5 °F (36.4 °C)-98.8 °F (37.1 °C)] 98.3 °F (36.8 °C)  Pulse:  [] 106  Resp:  [8-33] 27  SpO2:  [93 %-100 %] 98 %  BP: ()/(61-66) 96/64  Arterial Line BP: ()/(42-67) 115/57     Weight: 92 kg (202 lb 13.2 oz)  Body mass index is 29.1 kg/m².    Intake/Output - Last 3 Shifts       11/12 0700 - 11/13 0659 11/13 0700 - 11/14 0659 11/14 0700 - 11/15 0659    P.O.  890     I.V. (mL/kg) 4115.8 (45.7) 804 (8.7)     Blood  418 315     Other       NG/GT       Total Intake(mL/kg) 4533.8 (50.4) 2009 (21.8)     Urine (mL/kg/hr) 15 (0) 0 (0) 0 (0)    Emesis/NG output       Drains 1719      Other 3084 530     Stool  0     Blood       Total Output 4818 530 0    Net -284.2 +1479 0           Stool Occurrence  2 x           Physical Exam   Constitutional: He is oriented to person, place, and time. He appears well-developed.   jaundiced   HENT:   Head: Normocephalic and atraumatic.   Eyes: Scleral icterus is present.   Cardiovascular: Normal rate, regular rhythm and intact distal pulses.   Pulmonary/Chest: Effort normal. No respiratory distress.   Abdominal: Soft. He exhibits no distension.   Chevron incision c/d/i  Dressing over former site of right drain; ostomy bag over former left sided drain sites, collecting SS output   Musculoskeletal: He exhibits edema.   Neurological: He is alert and oriented to person, place, and time.   Skin: Skin is warm and dry.   Diffuse jaundice       Laboratory:  Immunosuppressants         Stop Route Frequency     mycophenolate (CELLCEPT) 1,000 mg in dextrose 5 % 250 mL IVPB      -- IV Daily        Labs within the past 24 hours have been reviewed.    Diagnostic Results:  I have personally reviewed all pertinent imaging studies.

## 2018-11-14 NOTE — ASSESSMENT & PLAN NOTE
Managed per LTS.   avoid hypoglycemia  Optimize BG control for surgical wound healing.     Lab Results   Component Value Date     (H) 11/14/2018    AST 86 (H) 11/14/2018     (H) 11/14/2018    ALKPHOS 137 (H) 11/14/2018    BILITOT 10.2 (H) 11/14/2018

## 2018-11-14 NOTE — SUBJECTIVE & OBJECTIVE
Past Medical History:   Diagnosis Date    Liver cirrhosis, alcoholic 09/30/2018       Past Surgical History:   Procedure Laterality Date    EGD (ESOPHAGOGASTRODUODENOSCOPY) N/A 11/6/2018    Performed by Duarte Jules MD at [facility] ENDO (2ND FLR)    ESOPHAGOGASTRODUODENOSCOPY N/A 11/6/2018    Procedure: EGD (ESOPHAGOGASTRODUODENOSCOPY);  Surgeon: Duarte Jules MD;  Location: [facility] (2ND FLR);  Service: Endoscopy;  Laterality: N/A;    INSERTION OF TUNNELED CENTRAL VENOUS HEMODIALYSIS CATHETER N/A 11/7/2018    Procedure: INSERTION, CATHETER, CENTRAL VENOUS, HEMODIALYSIS, TUNNELED;  Surgeon: Brock Gonzalez MD;  Location: [facility] CATH LAB;  Service: Nephrology;  Laterality: N/A;    INSERTION, CATHETER, CENTRAL VENOUS, HEMODIALYSIS, TUNNELED N/A 11/7/2018    Performed by Brock Gonzalez MD at [facility] CATH LAB    LIVER TRANSPLANT N/A 11/11/2018    Procedure: TRANSPLANT, LIVER;  Surgeon: Shmuel Leary MD;  Location: [facility] OR [address] [place] [address];  Service: Transplant;  Laterality: N/A;    TRANSPLANT, LIVER N/A 11/11/2018    Performed by Shmuel Leary MD at [facility] OR [address] [place] [address]       Review of patient's allergies indicates:   Allergen Reactions    Cefepime Rash    Penicillins Nausea And Vomiting and Rash     Full body rash from cefepime as well       Medications:  Medications Prior to Admission   Medication Sig    calcium carbonate/vitamin D3 (VITAMIN D-3 ORAL) Take 1 tablet by mouth once daily.    cyanocobalamin (VITAMIN B-12) 100 MCG tablet Take 100 mcg by mouth once daily.    folic acid (FOLVITE) 1 MG tablet Take 1 mg by mouth once daily.    lactulose (CHRONULAC) 10 gram/15 mL solution Take 20 g by mouth 3 (three) times daily.    multivitamin (THERAGRAN) per tablet Take 1 tablet by mouth once daily.    omeprazole (PRILOSEC) 40 MG capsule Take 40 mg by mouth once daily.    ondansetron (ZOFRAN) 4 MG tablet Take 4 mg by mouth daily as needed for Nausea.    rifAXIMin (XIFAXAN) 550 mg Tab Take 550 mg by  mouth 2 (two) times daily.     Antibiotics (From admission, onward)    Start     Stop Route Frequency Ordered    11/11/18 1700  ciprofloxacin (CIPRO)400mg/200ml D5W IVPB 400 mg      -- IV Every 12 hours (non-standard times) 11/11/18 1632        Antifungals (From admission, onward)    Start     Stop Route Frequency Ordered    11/11/18 1230  fluconazole (DIFLUCAN) IVPB 200 mg 100 mL      -- IV Every 24 hours (non-standard times) 11/11/18 1208        Antivirals (From admission, onward)        Stop Route Frequency     valganciclovir 50 mg/ml      -- PER NG TUBE Daily           Immunization History   Administered Date(s) Administered    Hepatitis A, Pediatric/Adolescent, 2 Dose 11/07/2018    Influenza - Quadrivalent - PF 10/04/2018    PPD Test 11/01/2018    Pneumococcal Conjugate - 13 Valent 11/06/2018       Family History     None        Social History     Socioeconomic History    Marital status:      Spouse name: None    Number of children: None    Years of education: None    Highest education level: None   Social Needs    Financial resource strain: None    Food insecurity - worry: None    Food insecurity - inability: None    Transportation needs - medical: None    Transportation needs - non-medical: None   Occupational History    None   Tobacco Use    Smoking status: Never Smoker   Substance and Sexual Activity    Alcohol use: None    Drug use: None    Sexual activity: None   Other Topics Concern    None   Social History Narrative    None     Review of Systems   Constitutional: Negative for activity change, appetite change, chills, fatigue and fever.   HENT: Negative for congestion, dental problem, mouth sores and sinus pressure.    Eyes: Negative for pain, redness and visual disturbance.   Respiratory: Negative for cough, shortness of breath and wheezing.    Cardiovascular: Negative for chest pain and leg swelling.   Gastrointestinal: Negative for abdominal distention, abdominal pain,  diarrhea, nausea and vomiting.   Endocrine: Negative for polyuria.   Genitourinary: Negative for decreased urine volume, dysuria and frequency.   Musculoskeletal: Negative for joint swelling.   Skin: Negative for color change.   Allergic/Immunologic: Negative for food allergies.   Neurological: Negative for dizziness, weakness and headaches.   Hematological: Negative for adenopathy.   Psychiatric/Behavioral: Positive for confusion. Negative for agitation. The patient is not nervous/anxious.      Objective:     Vital Signs (Most Recent):  Temp: 98.3 °F (36.8 °C) (11/14/18 0701)  Pulse: 106 (11/14/18 0900)  Resp: (!) 27 (11/14/18 0900)  BP: 96/64 (11/14/18 0836)  SpO2: 98 % (11/14/18 0900) Vital Signs (24h Range):  Temp:  [97.5 °F (36.4 °C)-98.8 °F (37.1 °C)] 98.3 °F (36.8 °C)  Pulse:  [] 106  Resp:  [8-33] 27  SpO2:  [93 %-100 %] 98 %  BP: ()/(61-66) 96/64  Arterial Line BP: ()/(42-67) 115/57     Weight: 92 kg (202 lb 13.2 oz)  Body mass index is 29.1 kg/m².    Estimated Creatinine Clearance: 32.5 mL/min (A) (based on SCr of 3 mg/dL (H)).    Physical Exam   Constitutional: He is oriented to person, place, and time. He appears well-developed and well-nourished.   HENT:   Head: Normocephalic and atraumatic.   Mouth/Throat: Oropharynx is clear and moist.   Eyes: Conjunctivae are normal.   Neck: Neck supple.   Cardiovascular: Normal rate, regular rhythm and normal heart sounds.   No murmur heard.  Pulmonary/Chest: Effort normal and breath sounds normal. No respiratory distress. He has no wheezes.   Abdominal: Soft. Bowel sounds are normal. He exhibits no distension. There is no tenderness.   Chevron with dressing in place   Musculoskeletal: Normal range of motion. He exhibits no edema or tenderness.   Lymphadenopathy:     He has no cervical adenopathy.   Neurological: He is alert and oriented to person, place, and time. Coordination normal.   Skin: Skin is warm and dry. No rash noted.   Psychiatric: He  has a normal mood and affect. His behavior is normal.       Significant Labs:   CBC:   Recent Labs   Lab 11/13/18  0406 11/14/18  0550 11/14/18  0621   WBC 13.55* 7.59 8.05   HGB 8.0* 6.0* 6.0*   HCT 23.3* 17.9* 17.7*   PLT 59* 52* 55*     CMP:   Recent Labs   Lab 11/12/18  1157  11/13/18  0406 11/13/18  1425 11/13/18  2210 11/14/18  0550   NA  --    < > 137  137 135* 132* 131*   K  --    < > 4.5  4.5 4.7 4.6 4.8   CL  --    < > 103  103 102 99 99   CO2  --    < > 25  25 23 25 23   GLU  --    < > 160*  160* 220* 294* 323*   BUN  --    < > 13  13 24* 35* 44*   CREATININE  --    < > 1.5*  1.5* 2.1* 2.7* 3.0*   CALCIUM  --    < > 7.8*  7.8* 7.9* 8.4* 8.0*   PROT  --   --  3.7*  --   --  4.0*   ALBUMIN  --    < > 2.2*  2.2* 2.6* 2.7* 2.3*   BILITOT  --   --  10.8*  --   --  10.2*   ALKPHOS  --   --  85  --   --  137*   *  --  163*  --   --  86*   ALT  --   --  339*  --   --  252*   ANIONGAP  --    < > 9  9 10 8 9   EGFRNONAA  --    < > 52.4*  52.4* 34.9* 25.7* 22.7*    < > = values in this interval not displayed.       Significant Imaging: I have reviewed all pertinent imaging results/findings within the past 24 hours.

## 2018-11-14 NOTE — PROGRESS NOTES
[facility]  Critical Care - Surgery  Progress Note    Patient Name: Femi Enciso  MRN: [id number]  Admission Date: 10/27/2018  Hospital Length of Stay: 18 days  Code Status: Full Code  Attending Provider: Femi Patel MD  Primary Care Provider: Primary Doctor No   Principal Problem: S/P liver transplant    Subjective:     Hospital/ICU Course:  11/11: s/p liver transplant  11/12 - extubated, weaned off pressors; pulled out all 3 JOSE A drains  11/13 - CRRT held overnight; 1U Plt    Interval History/Significant Events: CRRT held overnight. Patient received 1U Plt, improved 50 -> 63. Weaned to RA, HDS without pressors. Hgb dropped 8.0 -> 6.0 this AM. WBC downtrending on cipro for UTI. Insulin gtt changed to SSI, BG increasing from 170s to 300s.    Follow-up For: Procedure(s) (LRB):  TRANSPLANT, LIVER (N/A)    Post-Operative Day: 3 Days Post-Op    Objective:     Vital Signs (Most Recent):  Temp: 98.3 °F (36.8 °C) (11/14/18 0701)  Pulse: 99 (11/14/18 0701)  Resp: (!) 29 (11/14/18 0701)  BP: (P) 96/64 (11/14/18 0836)  SpO2: 97 % (11/14/18 0701) Vital Signs (24h Range):  Temp:  [97.5 °F (36.4 °C)-98.8 °F (37.1 °C)] 98.3 °F (36.8 °C)  Pulse:  [] 99  Resp:  [8-33] 29  SpO2:  [93 %-100 %] 97 %  BP: ()/(61-66) (P) 96/64  Arterial Line BP: ()/(42-67) 124/58     Weight: 92 kg (202 lb 13.2 oz)  Body mass index is 29.1 kg/m².      Intake/Output Summary (Last 24 hours) at 11/14/2018 0849  Last data filed at 11/14/2018 0700  Gross per 24 hour   Intake 2009 ml   Output 450 ml   Net 1559 ml       Physical Exam   Constitutional: He is oriented to person, place, and time. He appears well-developed.   jaundiced   HENT:   Head: Normocephalic and atraumatic.   Eyes: Scleral icterus is present.   Cardiovascular: Normal rate, regular rhythm and intact distal pulses.   Pulmonary/Chest: Effort normal. No respiratory distress.   Abdominal: Soft. He exhibits no distension.   Chevron incision c/d/i  Dressing over  former site of right drain; ostomy bag over former left sided drain sites, collecting SS output   Musculoskeletal: He exhibits edema.   Neurological: He is alert and oriented to person, place, and time.   Skin: Skin is warm and dry.   Diffuse jaundice       Lines/Drains/Airways     Central Venous Catheter Line                 Percutaneous Central Line Insertion/Assessment - double lumen  right subclavian -- days         Tunneled Central Line Insertion/Assessment - Double Lumen  11/07/18 1350 right internal jugular 6 days          Drain                 Urethral Catheter 11/11/18 0246 Straight-tip;Non-latex 16 Fr. 3 days          Arterial Line                 Arterial Line 11/11/18 0159 Left Radial 3 days          Peripheral Intravenous Line                 Peripheral IV - Single Lumen 11/09/18 1700 Right Antecubital 4 days         Peripheral IV - Single Lumen 11/11/18 0212 Left Antecubital 3 days                Significant Labs:    CBC/Anemia Profile:  Recent Labs   Lab 11/13/18  0406 11/14/18  0550 11/14/18  0621   WBC 13.55* 7.59 8.05   HGB 8.0* 6.0* 6.0*   HCT 23.3* 17.9* 17.7*   PLT 59* 52* 55*   MCV 90 95 95   RDW 14.9* 15.5* 15.4*        Chemistries:  Recent Labs   Lab 11/12/18  1157  11/13/18  0406 11/13/18  1425 11/13/18  2210 11/14/18  0550   NA  --    < > 137  137 135* 132* 131*   K  --    < > 4.5  4.5 4.7 4.6 4.8   CL  --    < > 103  103 102 99 99   CO2  --    < > 25  25 23 25 23   BUN  --    < > 13  13 24* 35* 44*   CREATININE  --    < > 1.5*  1.5* 2.1* 2.7* 3.0*   CALCIUM  --    < > 7.8*  7.8* 7.9* 8.4* 8.0*   ALBUMIN  --    < > 2.2*  2.2* 2.6* 2.7* 2.3*   PROT  --   --  3.7*  --   --  4.0*   BILITOT  --   --  10.8*  --   --  10.2*   ALKPHOS  --   --  85  --   --  137*   ALT  --   --  339*  --   --  252*   *  --  163*  --   --  86*   MG  --    < > 1.6 2.3 2.5 2.5   PHOS  --    < > 3.2 4.4 4.2 4.6*    < > = values in this interval not displayed.       Significant Imaging:  I have reviewed  and interpreted all pertinent imaging results/findings within the past 24 hours.    Assessment/Plan:     * S/P liver transplant    S/P liver transplant     52 y/o man with ESLD, ESRD and DM2 now s/p liver transplant     Neuro:   - Sedation: none  - Pain control: PRN fentanyl     Pulmonary:   - RA; O2 as required  - daily cxr  - duonebs prn     Cardiac:  - Drips: none  - HDS       Renal:   -CRRT held last night; reevaluate needs today  -trend BMP  - BUN/Cr: 13/1.5 -> 35/2.7  - Nephrology on board, appreciate recommendations     Infectious Disease:   - AF  - Trend WBC - 12 -> 13  - Abx: Cipro (UCx 11/11 grew Enterococcus)  - Prophylaxis per transplant - Valcyte, Diflucan  - IS per Transplant - MMF, Prograf     Hematology/Oncology:  - H&H 7.4/20.8 -> 8.0/23 after 2U pRBCs -> 6.0 today; transfuse 2U pRBCs  - Plt 84 -> 59 -> 50 -> 63 (after 1 U Plt) -> 52  - INR 1.5  - Trend CBC     Endocrine:  - BG increased 170s -> 300s after switching insulin gtt to SSI  - SSI  - Endocrinology on board, appreciate recommendations     Fluids/Electrolytes/Nutrition/GI:   - renal diet  - Trend CMP  - Replace Lytes PRN  - liver US 11/12 - 6.6 cm complex fluid collection deep to left lobe; increased flow velocity in main hepatic artery      Prophylaxis:  - SQH  - Famotidine     Dispo:  Continue ICU care - possibly stepdown pending Nephrology recs for CRRT requirements  Primary team:Transplant              Critical care was time spent personally by me on the following activities: development of treatment plan with patient or surrogate and bedside caregivers, discussions with consultants, evaluation of patient's response to treatment, examination of patient, ordering and performing treatments and interventions, ordering and review of laboratory studies, ordering and review of radiographic studies, pulse oximetry, re-evaluation of patient's condition.  This critical care time did not overlap with that of any other provider or involve time for  any procedures.     Miguel Ahmadi MD  Critical Care - Surgery  [facility]

## 2018-11-14 NOTE — SUBJECTIVE & OBJECTIVE
Interval History/Significant Events: CRRT held overnight. Patient received 1U Plt, improved 50 -> 63. Weaned to RA, HDS without pressors. Hgb dropped 8.0 -> 6.0 this AM. WBC downtrending on cipro for UTI. Insulin gtt changed to SSI, BG increasing from 170s to 300s.    Follow-up For: Procedure(s) (LRB):  TRANSPLANT, LIVER (N/A)    Post-Operative Day: 3 Days Post-Op    Objective:     Vital Signs (Most Recent):  Temp: 98.3 °F (36.8 °C) (11/14/18 0701)  Pulse: 99 (11/14/18 0701)  Resp: (!) 29 (11/14/18 0701)  BP: (P) 96/64 (11/14/18 0836)  SpO2: 97 % (11/14/18 0701) Vital Signs (24h Range):  Temp:  [97.5 °F (36.4 °C)-98.8 °F (37.1 °C)] 98.3 °F (36.8 °C)  Pulse:  [] 99  Resp:  [8-33] 29  SpO2:  [93 %-100 %] 97 %  BP: ()/(61-66) (P) 96/64  Arterial Line BP: ()/(42-67) 124/58     Weight: 92 kg (202 lb 13.2 oz)  Body mass index is 29.1 kg/m².      Intake/Output Summary (Last 24 hours) at 11/14/2018 0849  Last data filed at 11/14/2018 0700  Gross per 24 hour   Intake 2009 ml   Output 450 ml   Net 1559 ml       Physical Exam   Constitutional: He is oriented to person, place, and time. He appears well-developed.   jaundiced   HENT:   Head: Normocephalic and atraumatic.   Eyes: Scleral icterus is present.   Cardiovascular: Normal rate, regular rhythm and intact distal pulses.   Pulmonary/Chest: Effort normal. No respiratory distress.   Abdominal: Soft. He exhibits no distension.   Chevron incision c/d/i  Dressing over former site of right drain; ostomy bag over former left sided drain sites, collecting SS output   Musculoskeletal: He exhibits edema.   Neurological: He is alert and oriented to person, place, and time.   Skin: Skin is warm and dry.   Diffuse jaundice       Lines/Drains/Airways     Central Venous Catheter Line                 Percutaneous Central Line Insertion/Assessment - double lumen  right subclavian -- days         Tunneled Central Line Insertion/Assessment - Double Lumen  11/07/18 1836  right internal jugular 6 days          Drain                 Urethral Catheter 11/11/18 0246 Straight-tip;Non-latex 16 Fr. 3 days          Arterial Line                 Arterial Line 11/11/18 0159 Left Radial 3 days          Peripheral Intravenous Line                 Peripheral IV - Single Lumen 11/09/18 1700 Right Antecubital 4 days         Peripheral IV - Single Lumen 11/11/18 0212 Left Antecubital 3 days                Significant Labs:    CBC/Anemia Profile:  Recent Labs   Lab 11/13/18  0406 11/14/18  0550 11/14/18  0621   WBC 13.55* 7.59 8.05   HGB 8.0* 6.0* 6.0*   HCT 23.3* 17.9* 17.7*   PLT 59* 52* 55*   MCV 90 95 95   RDW 14.9* 15.5* 15.4*        Chemistries:  Recent Labs   Lab 11/12/18  1157  11/13/18  0406 11/13/18  1425 11/13/18  2210 11/14/18  0550   NA  --    < > 137  137 135* 132* 131*   K  --    < > 4.5  4.5 4.7 4.6 4.8   CL  --    < > 103  103 102 99 99   CO2  --    < > 25  25 23 25 23   BUN  --    < > 13  13 24* 35* 44*   CREATININE  --    < > 1.5*  1.5* 2.1* 2.7* 3.0*   CALCIUM  --    < > 7.8*  7.8* 7.9* 8.4* 8.0*   ALBUMIN  --    < > 2.2*  2.2* 2.6* 2.7* 2.3*   PROT  --   --  3.7*  --   --  4.0*   BILITOT  --   --  10.8*  --   --  10.2*   ALKPHOS  --   --  85  --   --  137*   ALT  --   --  339*  --   --  252*   *  --  163*  --   --  86*   MG  --    < > 1.6 2.3 2.5 2.5   PHOS  --    < > 3.2 4.4 4.2 4.6*    < > = values in this interval not displayed.       Significant Imaging:  I have reviewed and interpreted all pertinent imaging results/findings within the past 24 hours.

## 2018-11-14 NOTE — PROGRESS NOTES
[facility]  Liver Transplant  Progress Note    Patient Name: Femi Enciso  MRN: [id number]  Admission Date: 10/27/2018  Hospital Length of Stay: 18 days  Code Status: Full Code  Primary Care Provider: Primary Doctor No  Post-Op Day 3 Days Post-Op      ORGAN:   LIVER  Disease Etiology: Alcoholic Cirrhosis  Donor Type:    - Brain Death  CDC High Risk:   No  Donor CMV Status:   Donor CMV Status: Positive  Donor HBcAB:   Negative  Donor HCV Status:   Negative  Donor HBV JAVIER: Negative  Donor HCV JAVIER: Negative  Whole or Partial: Whole Liver  Biliary Anastomosis: End to End  Arterial Anatomy: Standard    Subjective:     Scheduled Meds:   albumin human 5%  25 g Intravenous Once    albumin human 5%  25 g Intravenous Once    ciprofloxacin  400 mg Intravenous Q12H    famotidine (PF)  20 mg Intravenous Daily    fluconazole (DIFLUCAN) IVPB  200 mg Intravenous Q24H    heparin (porcine)  5,000 Units Subcutaneous Q8H    insulin aspart U-100  2-4 Units Subcutaneous TIDWM    methylPREDNISolone sodium succinate  60 mg Intravenous Q12H    Followed by    [START ON 11/15/2018] methylPREDNISolone sodium succinate  40 mg Intravenous Q12H    Followed by    [START ON 2018] methylPREDNISolone sodium succinate  20 mg Intravenous Q12H    Followed by    [START ON 2018] predniSONE  20 mg Oral Daily    mycophenolate (CELLCEPT) IVPB  1,000 mg Intravenous Daily    [START ON 2018] valganciclovir 50 mg/ml  450 mg Per NG tube Daily     Continuous Infusions:   insulin (HUMAN R) infusion (adults) 1 Units/hr (18 0828)    norepinephrine bitartrate-D5W 0.02 mcg/kg/min (18 0900)    propofol Stopped (18 1500)    vasopressin (PITRESSIN) infusion Stopped (18 1600)     PRN Meds:sodium chloride, sodium chloride, dextrose 50%, dextrose 50%, glucagon (human recombinant), glucose, glucose, insulin aspart U-100, oxyCODONE, oxyCODONE, ramelteon    Review of Systems  Objective:     Vital  Signs (Most Recent):  Temp: 98.3 °F (36.8 °C) (11/14/18 0701)  Pulse: 106 (11/14/18 0900)  Resp: (!) 27 (11/14/18 0900)  BP: 96/64 (11/14/18 0836)  SpO2: 98 % (11/14/18 0900) Vital Signs (24h Range):  Temp:  [97.5 °F (36.4 °C)-98.8 °F (37.1 °C)] 98.3 °F (36.8 °C)  Pulse:  [] 106  Resp:  [8-33] 27  SpO2:  [93 %-100 %] 98 %  BP: ()/(61-66) 96/64  Arterial Line BP: ()/(42-67) 115/57     Weight: 92 kg (202 lb 13.2 oz)  Body mass index is 29.1 kg/m².    Intake/Output - Last 3 Shifts       11/12 0700 - 11/13 0659 11/13 0700 - 11/14 0659 11/14 0700 - 11/15 0659    P.O.  890     I.V. (mL/kg) 4115.8 (45.7) 804 (8.7)     Blood 418 315     Other       NG/GT       Total Intake(mL/kg) 4533.8 (50.4) 2009 (21.8)     Urine (mL/kg/hr) 15 (0) 0 (0) 0 (0)    Emesis/NG output       Drains 1719      Other 3084 530     Stool  0     Blood       Total Output 4818 530 0    Net -284.2 +1479 0           Stool Occurrence  2 x           Physical Exam   Constitutional: He is oriented to person, place, and time. He appears well-developed.   jaundiced   HENT:   Head: Normocephalic and atraumatic.   Eyes: Scleral icterus is present.   Cardiovascular: Normal rate, regular rhythm and intact distal pulses.   Pulmonary/Chest: Effort normal. No respiratory distress.   Abdominal: Soft. He exhibits no distension.   Chevron incision c/d/i  Dressing over former site of right drain; ostomy bag over former left sided drain sites, collecting SS output   Musculoskeletal: He exhibits edema.   Neurological: He is alert and oriented to person, place, and time.   Skin: Skin is warm and dry.   Diffuse jaundice       Laboratory:  Immunosuppressants         Stop Route Frequency     mycophenolate (CELLCEPT) 1,000 mg in dextrose 5 % 250 mL IVPB      -- IV Daily        Labs within the past 24 hours have been reviewed.    Diagnostic Results:  I have personally reviewed all pertinent imaging studies.    Assessment/Plan:   Femi Enciso is a 53 y.o. male      Derm   Acute maculopapular rash    - morbilliform rash likely from drug reaction possibly Cefepime (10/27-10/30).  Per patient rash is very pruritic.  He has been using steroid cream w minimal relief.  He stated significant improvement w receiving Hydroxyzine.  Monitor.         Renal/   Metabolic acidosis    - well managed on sodium bicarb.  Trend daily.        DEL (acute kidney injury)    - DEL over past 2 weeks.  Nephrology was consulted.  Pt not a candidate for kidney transplant before 12-4-18.    - pt tolerating HD w no fluid removed today given hypotension.    - pt w offer for liver transplant this evening.  He will receive intra-op HD.    - cont strict I/O's.         Hematology   Coagulopathy    - to improve w transplant.  No evidence of acute bleeding, no hematemesis or BRBPR.         Endocrine   Moderate protein malnutrition    - temporal muscle wasting noted.  Decreased appetite and energy over past week worse.   Dietician consulted on admit.  Will consult post transplant as needed.         GI   * S/P liver transplant      53 year old male with history of ESLD 2/2 ETOH cirrhosis who is s/p liver transplant. POD#2    Neuro  -acute change in mental status with confusion and agitation this AM   -will add haldol for agitation and ativan   -sending blood cultures and urine cultures for evaluation     CV  -off pressor support   -swan tricia catheter has been removed   -monitor on telemetry     Resp  -extubated yesterday w/o issues   -continue to monitor O2 sats     Heme  -H/H drop to 6.0 and 17  -transfuse 2u prbc   -liver ultrasound     ID  -monitor fever and WBC   -f/u cultures     MSK  -OOBTC  -ambulation as tolerated      FEN/GI  -replace lytes   -Diabetic diet     Endocrine  -insulin as needed. Monitor BG       -CRRT per renal     dispo  -continue ICU care.      Hepatorenal syndrome    - del past 2 weeks.  Pt on Midodrine.  Last HD 11/9/18- 0 fluid removed 2/2 hypotension.  - pt will receive intra op  HD.         Jaundice    - t bili 37.5.  Monitor.       Other   Pre-transplant evaluation for liver transplant      54 y/o man with DM2, ESLD secondary to EtOH abuse in 09/2018, DEL progressing to ESRD requiring RRT. He has had worsening renal function since September 2018. His last drink was in 09/2018. He has had no surgical procedures on abdomen and has been mobilizing well till admission. He may qualify as SLK candidate in view of renal function deterioration over 6 weeks. He needs Transplant nephrology opinion about candidacy for SLK transplant. His cardiac evaluation needs to be updated and cross sectional imaging is needed prior to activation. He is  Surgically acceptable & SC risk A candidate on surgical evaluation. Addiction Psych consult shall be made during this admission.         The patients clinical status was discussed at multidisplinary rounds, involving transplant surgery, transplant medicine, pharmacy, nursing, nutrition, and social work.    Nadia Michael MD  Liver Transplant  [facility]

## 2018-11-14 NOTE — NURSING
"Pt refusing all medications, stating "I do not need them, I have other things to do." Notified care team.   "

## 2018-11-14 NOTE — ASSESSMENT & PLAN NOTE
52 y/o M h/o DM2, ETOH dependence c/b hepatitis admitted 10/27 for acute liver failure (c/b PSE, HRS, hepatic hydrothorax) and transplant evaluation  s/p DBDLT 11/11/18( CMV D+/R-, steroid induction, MMF/tacro maintenance) c/b significant blood product transfusions now extubated and ID consulted for delirium  - unclear etiology but VSS and no other labs concerning for invasive or severe infectious etiology  - unclear if related to VRE in urine culture at this time  - would stop cipro and will monitor  - discussed with team, will follow

## 2018-11-14 NOTE — ASSESSMENT & PLAN NOTE
S/P liver transplant     52 y/o man with ESLD, ESRD and DM2 now s/p liver transplant     Neuro:   - Sedation: none  - Pain control: PRN fentanyl     Pulmonary:   - RA; O2 as required  - daily cxr  - duonebs prn     Cardiac:  - Drips: none  - HDS       Renal:   -CRRT held last night; reevaluate needs today  -trend BMP  - BUN/Cr: 13/1.5 -> 35/2.7  - Nephrology on board, appreciate recommendations     Infectious Disease:   - AF  - Trend WBC - 12 -> 13  - Abx: Cipro (UCx 11/11 grew Enterococcus)  - Prophylaxis per transplant - Valcyte, Diflucan  - IS per Transplant - MMF, Prograf     Hematology/Oncology:  - H&H 7.4/20.8 -> 8.0/23 after 2U pRBCs -> 6.0 today; transfuse 2U pRBCs  - Plt 84 -> 59 -> 50 -> 63 (after 1 U Plt) -> 52  - INR 1.5  - Trend CBC     Endocrine:  - BG increased 170s -> 300s after switching insulin gtt to SSI  - SSI  - Endocrinology on board, appreciate recommendations     Fluids/Electrolytes/Nutrition/GI:   - renal diet  - Trend CMP  - Replace Lytes PRN  - liver US 11/12 - 6.6 cm complex fluid collection deep to left lobe; increased flow velocity in main hepatic artery      Prophylaxis:  - SQH  - Famotidine     Dispo:  Continue ICU care - possibly stepdown pending Nephrology recs for CRRT requirements  Primary team:Transplant

## 2018-11-14 NOTE — NURSING
Pt refusing all care. Will not let me hang blood, check blood sugar, or give any medications.  Care team notified, will come to bedside.

## 2018-11-14 NOTE — CONSULTS
[facility]  Infectious Disease  Consult Note    Patient Name: Femi Enciso  MRN: [id number]  Admission Date: 10/27/2018  Hospital Length of Stay: 18 days  Attending Physician: Femi Patel MD  Primary Care Provider: Primary Doctor No     Isolation Status: Contact    Patient information was obtained from patient.      Inpatient consult to Infectious Diseases  Consult performed by: Clem Drake MD  Consult ordered by: Nadia Michael MD  Reason for consult: delirium        Assessment/Plan:     Delirium    54 y/o M h/o DM2, ETOH dependence c/b hepatitis admitted 10/27 for acute liver failure (c/b PSE, HRS, hepatic hydrothorax) and transplant evaluation  s/p DBDLT 11/11/18( CMV D+/R-, steroid induction, MMF/tacro maintenance) c/b significant blood product transfusions now extubated and ID consulted for delirium  - unclear etiology but VSS and no other labs concerning for invasive or severe infectious etiology  - unclear if related to VRE in urine culture at this time  - would stop cipro and will monitor  - discussed with team, will follow         Thank you for your consult. I will follow-up with patient. Please contact us if you have any additional questions.    Clem Drake MD  Infectious Disease  [facility]    Subjective:     Principal Problem: S/P liver transplant    HPI: 54 y/o M h/o DM2, ETOH dependence c/b hepatitis admitted 10/27 for acute liver failure (c/b PSE, HRS, hepatic hydrothorax) and transplant evaluation  s/p DBDLT 11/11/18( CMV D+/R-, steroid induction, MMF/tacro maintenance) c/b significant blood product transfusions now extubated and ID consulted for delerium    Past Medical History:   Diagnosis Date    Liver cirrhosis, alcoholic 09/30/2018       Past Surgical History:   Procedure Laterality Date    EGD (ESOPHAGOGASTRODUODENOSCOPY) N/A 11/6/2018    Performed by Duarte Jules MD at [facility] ([address] [place] [address])    ESOPHAGOGASTRODUODENOSCOPY N/A 11/6/2018     Procedure: EGD (ESOPHAGOGASTRODUODENOSCOPY);  Surgeon: Duarte Jules MD;  [facility]  Service: Endoscopy;  Laterality: N/A;    INSERTION OF TUNNELED CENTRAL VENOUS HEMODIALYSIS CATHETER N/A 11/7/2018    Procedure: INSERTION, CATHETER, CENTRAL VENOUS, HEMODIALYSIS, TUNNELED;  Surgeon: Brock Gonzalez MD;  Location: [facility] CATH LAB;  Service: Nephrology;  Laterality: N/A;    INSERTION, CATHETER, CENTRAL VENOUS, HEMODIALYSIS, TUNNELED N/A 11/7/2018    Performed by Brock Gonzalez MD at [facility] CATH LAB    LIVER TRANSPLANT N/A 11/11/2018    Procedure: TRANSPLANT, LIVER;  Surgeon: Shmuel Leary MD;  Location: [facility] OR [address] [place] [address];  Service: Transplant;  Laterality: N/A;    TRANSPLANT, LIVER N/A 11/11/2018    Performed by Shmuel Leary MD at [facility] OR [address] [place] [address]       Review of patient's allergies indicates:   Allergen Reactions    Cefepime Rash    Penicillins Nausea And Vomiting and Rash     Full body rash from cefepime as well       Medications:  Medications Prior to Admission   Medication Sig    calcium carbonate/vitamin D3 (VITAMIN D-3 ORAL) Take 1 tablet by mouth once daily.    cyanocobalamin (VITAMIN B-12) 100 MCG tablet Take 100 mcg by mouth once daily.    folic acid (FOLVITE) 1 MG tablet Take 1 mg by mouth once daily.    lactulose (CHRONULAC) 10 gram/15 mL solution Take 20 g by mouth 3 (three) times daily.    multivitamin (THERAGRAN) per tablet Take 1 tablet by mouth once daily.    omeprazole (PRILOSEC) 40 MG capsule Take 40 mg by mouth once daily.    ondansetron (ZOFRAN) 4 MG tablet Take 4 mg by mouth daily as needed for Nausea.    rifAXIMin (XIFAXAN) 550 mg Tab Take 550 mg by mouth 2 (two) times daily.     Antibiotics (From admission, onward)    Start     Stop Route Frequency Ordered    11/11/18 1700  ciprofloxacin (CIPRO)400mg/200ml D5W IVPB 400 mg      -- IV Every 12 hours (non-standard times) 11/11/18 1632        Antifungals (From admission, onward)    Start      Stop Route Frequency Ordered    11/11/18 1230  fluconazole (DIFLUCAN) IVPB 200 mg 100 mL      -- IV Every 24 hours (non-standard times) 11/11/18 1208        Antivirals (From admission, onward)        Stop Route Frequency     valganciclovir 50 mg/ml      -- PER NG TUBE Daily           Immunization History   Administered Date(s) Administered    Hepatitis A, Pediatric/Adolescent, 2 Dose 11/07/2018    Influenza - Quadrivalent - PF 10/04/2018    PPD Test 11/01/2018    Pneumococcal Conjugate - 13 Valent 11/06/2018       Family History     None        Social History     Socioeconomic History    Marital status:      Spouse name: None    Number of children: None    Years of education: None    Highest education level: None   Social Needs    Financial resource strain: None    Food insecurity - worry: None    Food insecurity - inability: None    Transportation needs - medical: None    Transportation needs - non-medical: None   Occupational History    None   Tobacco Use    Smoking status: Never Smoker   Substance and Sexual Activity    Alcohol use: None    Drug use: None    Sexual activity: None   Other Topics Concern    None   Social History Narrative    None     Review of Systems   Constitutional: Negative for activity change, appetite change, chills, fatigue and fever.   HENT: Negative for congestion, dental problem, mouth sores and sinus pressure.    Eyes: Negative for pain, redness and visual disturbance.   Respiratory: Negative for cough, shortness of breath and wheezing.    Cardiovascular: Negative for chest pain and leg swelling.   Gastrointestinal: Negative for abdominal distention, abdominal pain, diarrhea, nausea and vomiting.   Endocrine: Negative for polyuria.   Genitourinary: Negative for decreased urine volume, dysuria and frequency.   Musculoskeletal: Negative for joint swelling.   Skin: Negative for color change.   Allergic/Immunologic: Negative for food allergies.   Neurological:  Negative for dizziness, weakness and headaches.   Hematological: Negative for adenopathy.   Psychiatric/Behavioral: Positive for confusion. Negative for agitation. The patient is not nervous/anxious.      Objective:     Vital Signs (Most Recent):  Temp: 98.3 °F (36.8 °C) (11/14/18 0701)  Pulse: 106 (11/14/18 0900)  Resp: (!) 27 (11/14/18 0900)  BP: 96/64 (11/14/18 0836)  SpO2: 98 % (11/14/18 0900) Vital Signs (24h Range):  Temp:  [97.5 °F (36.4 °C)-98.8 °F (37.1 °C)] 98.3 °F (36.8 °C)  Pulse:  [] 106  Resp:  [8-33] 27  SpO2:  [93 %-100 %] 98 %  BP: ()/(61-66) 96/64  Arterial Line BP: ()/(42-67) 115/57     Weight: 92 kg (202 lb 13.2 oz)  Body mass index is 29.1 kg/m².    Estimated Creatinine Clearance: 32.5 mL/min (A) (based on SCr of 3 mg/dL (H)).    Physical Exam   Constitutional: He is oriented to person, place, and time. He appears well-developed and well-nourished.   HENT:   Head: Normocephalic and atraumatic.   Mouth/Throat: Oropharynx is clear and moist.   Eyes: Conjunctivae are normal.   Neck: Neck supple.   Cardiovascular: Normal rate, regular rhythm and normal heart sounds.   No murmur heard.  Pulmonary/Chest: Effort normal and breath sounds normal. No respiratory distress. He has no wheezes.   Abdominal: Soft. Bowel sounds are normal. He exhibits no distension. There is no tenderness.   Chevron with dressing in place   Musculoskeletal: Normal range of motion. He exhibits no edema or tenderness.   Lymphadenopathy:     He has no cervical adenopathy.   Neurological: He is alert and oriented to person, place, and time. Coordination normal.   Skin: Skin is warm and dry. No rash noted.   Psychiatric: He has a normal mood and affect. His behavior is normal.       Significant Labs:   CBC:   Recent Labs   Lab 11/13/18  0406 11/14/18  0550 11/14/18  0621   WBC 13.55* 7.59 8.05   HGB 8.0* 6.0* 6.0*   HCT 23.3* 17.9* 17.7*   PLT 59* 52* 55*     CMP:   Recent Labs   Lab 11/12/18  1157  11/13/18  0406  11/13/18  1425 11/13/18  2210 11/14/18  0550   NA  --    < > 137  137 135* 132* 131*   K  --    < > 4.5  4.5 4.7 4.6 4.8   CL  --    < > 103  103 102 99 99   CO2  --    < > 25  25 23 25 23   GLU  --    < > 160*  160* 220* 294* 323*   BUN  --    < > 13  13 24* 35* 44*   CREATININE  --    < > 1.5*  1.5* 2.1* 2.7* 3.0*   CALCIUM  --    < > 7.8*  7.8* 7.9* 8.4* 8.0*   PROT  --   --  3.7*  --   --  4.0*   ALBUMIN  --    < > 2.2*  2.2* 2.6* 2.7* 2.3*   BILITOT  --   --  10.8*  --   --  10.2*   ALKPHOS  --   --  85  --   --  137*   *  --  163*  --   --  86*   ALT  --   --  339*  --   --  252*   ANIONGAP  --    < > 9  9 10 8 9   EGFRNONAA  --    < > 52.4*  52.4* 34.9* 25.7* 22.7*    < > = values in this interval not displayed.       Significant Imaging: I have reviewed all pertinent imaging results/findings within the past 24 hours.

## 2018-11-14 NOTE — ASSESSMENT & PLAN NOTE
Avoid insulin stacking and hypoglycemia.  Lab Results   Component Value Date    CREATININE 3.0 (H) 11/14/2018

## 2018-11-14 NOTE — PROGRESS NOTES
"[facility]  Endocrinology  Progress Note    Admit Date: 10/27/2018     Reason for Consult: Management of Hyperglycemia     Surgical Procedure and Date: liver transplant 11/11/18    HPI:   Patient is a 53 y.o. male with a diagnosis of ESLD secondary to ETOH abuse and DEL with ESRD with RRT. Patient is now s/p liver transplant. Endocrinology consulted for BG management.  Patient reports having DM x 1 year, takes Metformin 1000 mg BID.      Lab Results   Component Value Date    HGBA1C 4.4 11/09/2018             Interval HPI:   Overnight events: Remains in ICU, mild confusion overnight.  BG trending up overnight on transition IV insulin infusion at 0.5 u/hr.  Solu medrol 60 mg BID, standard steroid taper.  On IV antibiotics.  Of note, low H&H this am of 6/17.  Patient refusing all treatment at this time.  Eating:   <25%  Nausea: No  Hypoglycemia and intervention: No  Fever: No  TPN and/or TF: No    BP (P) 96/64   Pulse 99   Temp 98.3 °F (36.8 °C) (Oral)   Resp (!) 29   Ht 5' 10" (1.778 m)   Wt 92 kg (202 lb 13.2 oz)   SpO2 97%   BMI 29.10 kg/m²      Labs Reviewed and Include    Recent Labs   Lab 11/14/18  0550   *   CALCIUM 8.0*   ALBUMIN 2.3*   PROT 4.0*   *   K 4.8   CO2 23   CL 99   BUN 44*   CREATININE 3.0*   ALKPHOS 137*   *   AST 86*   BILITOT 10.2*     Lab Results   Component Value Date    WBC 8.05 11/14/2018    HGB 6.0 (L) 11/14/2018    HCT 17.7 (LL) 11/14/2018    MCV 95 11/14/2018    PLT 55 (L) 11/14/2018     No results for input(s): TSH, FREET4 in the last 168 hours.  Lab Results   Component Value Date    HGBA1C 4.4 11/09/2018       Nutritional status:   Body mass index is 29.1 kg/m².  Lab Results   Component Value Date    ALBUMIN 2.3 (L) 11/14/2018    ALBUMIN 2.7 (L) 11/13/2018    ALBUMIN 2.6 (L) 11/13/2018     Lab Results   Component Value Date    PREALBUMIN 7 (L) 10/27/2018       Estimated Creatinine Clearance: 32.5 mL/min (A) (based on SCr of 3 mg/dL " (H)).    Accu-Checks  Recent Labs     11/12/18  2206 11/13/18  0004 11/13/18  0202 11/13/18  0405 11/13/18  0614 11/13/18  0824 11/13/18  1259 11/13/18  1834 11/13/18  2222 11/14/18  0340   POCTGLUCOSE 162* 162* 169* 173* 202* 205* 207* 284* 281* 309*       Current Medications and/or Treatments Impacting Glycemic Control  Immunotherapy:    Immunosuppressants         Stop Route Frequency     tacrolimus (PROGRAF) 1 mg/mL oral syringe      -- Oral 2 times daily     mycophenolate mofetil 200 mg/mL suspension 1,000 mg      -- Oral 2 times daily        Steroids:   Hormones (From admission, onward)    Start     Stop Route Frequency Ordered    11/17/18 0900  predniSONE tablet 20 mg  (methylprednisolone taper panel)      -- Oral Daily 11/11/18 1208    11/16/18 0900  methylPREDNISolone sodium succinate injection 20 mg  (methylprednisolone taper panel)      11/17 0859 IV Every 12 hours 11/11/18 1208    11/15/18 0900  methylPREDNISolone sodium succinate injection 40 mg  (methylprednisolone taper panel)      11/16 0859 IV Every 12 hours 11/11/18 1208    11/14/18 0900  methylPREDNISolone sodium succinate injection 60 mg  (methylprednisolone taper panel)      11/15 0859 IV Every 12 hours 11/11/18 1208    11/11/18 1445  vasopressin (PITRESSIN) 0.2 Units/mL in dextrose 5 % 100 mL infusion      -- IV Continuous 11/11/18 1337    11/09/18 1345  methylPREDNISolone sodium succinate injection 500 mg  (Med - Immunosuppression Induction Therapy (Methylprednisolone))      11/10 0144 IV Once pre-op 11/09/18 1236        Pressors:    Autonomic Drugs (From admission, onward)    Start     Stop Route Frequency Ordered    11/11/18 1335  norepinephrine 4 mg in dextrose 5% 250 mL infusion (premix) (titrating)     Question Answer Comment   Titrate by: (in mcg/kg/min) 0.01    Titrate interval: (in minutes) 2    Titrate to maintain: (MAP or SBP) MAP    Greater than: (in mmHg) 65    Maximum dose: (in mcg/kg/min) 2        -- IV Continuous 11/11/18 1337         Hyperglycemia/Diabetes Medications:   Antihyperglycemics (From admission, onward)    Start     Stop Route Frequency Ordered    11/14/18 1130  insulin aspart U-100 pen 2-4 Units      -- SubQ 3 times daily with meals 11/14/18 0716    11/13/18 1100  insulin regular (Humulin R) 100 Units in sodium chloride 0.9% 100 mL infusion      -- IV Continuous 11/13/18 0954    11/13/18 1054  insulin aspart U-100 pen 0-4 Units      -- SubQ As needed (PRN) 11/13/18 0954          ASSESSMENT and PLAN    * S/P liver transplant    Managed per LTS.   avoid hypoglycemia  Optimize BG control for surgical wound healing.     Lab Results   Component Value Date     (H) 11/14/2018    AST 86 (H) 11/14/2018     (H) 11/14/2018    ALKPHOS 137 (H) 11/14/2018    BILITOT 10.2 (H) 11/14/2018            Type 2 diabetes mellitus without complication    BG goal 140-180    Increase transition IV insulin infusion to 1 u/hr  Increase prandial Novolog to 2-4 units with meals  Low dose corrections scale  Change BG monitoring to AC/HS/0200    ADDENDUM: Decrease transition IV insulin infusion to 0.5 u/hr given patient non-compliance with treatment regimen    Discharge planning: TBD       Adrenal cortical steroids causing adverse effect in therapeutic use    On standard steroid taper per transplant team; may elevate BG readings         DEL (acute kidney injury)    Avoid insulin stacking and hypoglycemia.  Lab Results   Component Value Date    CREATININE 3.0 (H) 11/14/2018            Hepatorenal syndrome    Avoid insulin stacking and hypoglycemia.         Prophylactic immunotherapy    May increase insulin resistance.        Overweight (BMI 25.0-29.9)    may contribute to insulin resistance  Body mass index is 29.1 kg/m².             Zoran Almeida, ABAD  Endocrinology  [facility]

## 2018-11-14 NOTE — ASSESSMENT & PLAN NOTE
BG goal 140-180    Increase transition IV insulin infusion to 1 u/hr  Increase prandial Novolog to 2-4 units with meals  Low dose corrections scale  Change BG monitoring to AC/HS/0200    ADDENDUM: Decrease transition IV insulin infusion to 0.5 u/hr given patient non-compliance with treatment regimen    Discharge planning: TBD

## 2018-11-14 NOTE — PLAN OF CARE
Problem: Patient Care Overview  Goal: Plan of Care Review  Outcome: Ongoing (interventions implemented as appropriate)  POC reviewed with pt and family at 1400.   Pt and family verbalized understanding.   Questions and concerns addressed.   See flowsheets for full assessment and VS info.     Pt continues to refuse care, care team notified.  Insulin gtt @ 0.5, per endocrinology.   2 units blood given.   BM x 1.

## 2018-11-15 PROBLEM — N17.9 ACUTE RENAL FAILURE: Status: ACTIVE | Noted: 2018-10-27

## 2018-11-15 LAB
ALBUMIN SERPL BCP-MCNC: 2.2 G/DL
ALBUMIN SERPL BCP-MCNC: 2.4 G/DL
ALLENS TEST: ABNORMAL
ALP SERPL-CCNC: 201 U/L
ALP SERPL-CCNC: 333 U/L
ALT SERPL W/O P-5'-P-CCNC: 258 U/L
ALT SERPL W/O P-5'-P-CCNC: 269 U/L
ANION GAP SERPL CALC-SCNC: 5 MMOL/L
ANION GAP SERPL CALC-SCNC: 5 MMOL/L
ANION GAP SERPL CALC-SCNC: 6 MMOL/L
ANION GAP SERPL CALC-SCNC: 6 MMOL/L
ANION GAP SERPL CALC-SCNC: 9 MMOL/L
APTT BLDCRRT: 26.3 SEC
AST SERPL-CCNC: 117 U/L
AST SERPL-CCNC: 132 U/L
BACTERIA BLD CULT: NORMAL
BACTERIA BLD CULT: NORMAL
BASOPHILS # BLD AUTO: 0.01 K/UL
BASOPHILS NFR BLD: 0.1 %
BILIRUB DIRECT SERPL-MCNC: 8.9 MG/DL
BILIRUB SERPL-MCNC: 12.1 MG/DL
BILIRUB SERPL-MCNC: 13.7 MG/DL
BLD PROD TYP BPU: NORMAL
BLOOD UNIT EXPIRATION DATE: NORMAL
BLOOD UNIT TYPE CODE: 5100
BLOOD UNIT TYPE: NORMAL
BUN SERPL-MCNC: 18 MG/DL
BUN SERPL-MCNC: 7 MG/DL
BUN SERPL-MCNC: 7 MG/DL
BUN SERPL-MCNC: 8 MG/DL
BUN SERPL-MCNC: 8 MG/DL
CALCIUM SERPL-MCNC: 7.6 MG/DL
CALCIUM SERPL-MCNC: 8 MG/DL
CHLORIDE SERPL-SCNC: 103 MMOL/L
CHLORIDE SERPL-SCNC: 106 MMOL/L
CO2 SERPL-SCNC: 25 MMOL/L
CO2 SERPL-SCNC: 26 MMOL/L
CO2 SERPL-SCNC: 26 MMOL/L
CO2 SERPL-SCNC: 27 MMOL/L
CO2 SERPL-SCNC: 27 MMOL/L
CODING SYSTEM: NORMAL
CREAT SERPL-MCNC: 0.7 MG/DL
CREAT SERPL-MCNC: 0.7 MG/DL
CREAT SERPL-MCNC: 0.8 MG/DL
CREAT SERPL-MCNC: 0.8 MG/DL
CREAT SERPL-MCNC: 1.4 MG/DL
DELSYS: ABNORMAL
DIFFERENTIAL METHOD: ABNORMAL
DISPENSE STATUS: NORMAL
EOSINOPHIL # BLD AUTO: 0.1 K/UL
EOSINOPHIL # BLD AUTO: 0.5 K/UL
EOSINOPHIL NFR BLD: 0.6 %
EOSINOPHIL NFR BLD: 0.9 %
EOSINOPHIL NFR BLD: 1.1 %
EOSINOPHIL NFR BLD: 3.6 %
ERYTHROCYTE [DISTWIDTH] IN BLOOD BY AUTOMATED COUNT: 14.6 %
ERYTHROCYTE [DISTWIDTH] IN BLOOD BY AUTOMATED COUNT: 14.8 %
ERYTHROCYTE [DISTWIDTH] IN BLOOD BY AUTOMATED COUNT: 14.9 %
EST. GFR  (AFRICAN AMERICAN): >60 ML/MIN/1.73 M^2
EST. GFR  (NON AFRICAN AMERICAN): 57 ML/MIN/1.73 M^2
EST. GFR  (NON AFRICAN AMERICAN): >60 ML/MIN/1.73 M^2
FIO2: 21
GGT SERPL-CCNC: 638 U/L
GLUCOSE SERPL-MCNC: 106 MG/DL
GLUCOSE SERPL-MCNC: 106 MG/DL
GLUCOSE SERPL-MCNC: 126 MG/DL
GLUCOSE SERPL-MCNC: 126 MG/DL
GLUCOSE SERPL-MCNC: 136 MG/DL
HCO3 UR-SCNC: 25.2 MMOL/L (ref 24–28)
HCT VFR BLD AUTO: 21.7 %
HCT VFR BLD AUTO: 21.7 %
HCT VFR BLD AUTO: 23.3 %
HCT VFR BLD AUTO: 23.4 %
HCT VFR BLD AUTO: 23.4 %
HCT VFR BLD AUTO: 23.9 %
HCT VFR BLD AUTO: 25.1 %
HGB BLD-MCNC: 7.6 G/DL
HGB BLD-MCNC: 7.6 G/DL
HGB BLD-MCNC: 7.9 G/DL
HGB BLD-MCNC: 7.9 G/DL
HGB BLD-MCNC: 8 G/DL
HGB BLD-MCNC: 8.1 G/DL
HGB BLD-MCNC: 8.5 G/DL
IMM GRANULOCYTES # BLD AUTO: 0.1 K/UL
IMM GRANULOCYTES # BLD AUTO: 0.12 K/UL
IMM GRANULOCYTES # BLD AUTO: 0.15 K/UL
IMM GRANULOCYTES # BLD AUTO: 0.19 K/UL
IMM GRANULOCYTES NFR BLD AUTO: 0.9 %
IMM GRANULOCYTES NFR BLD AUTO: 0.9 %
IMM GRANULOCYTES NFR BLD AUTO: 1.2 %
IMM GRANULOCYTES NFR BLD AUTO: 1.3 %
INR PPP: 1.2
LACTATE SERPL-SCNC: 0.6 MMOL/L
LYMPHOCYTES # BLD AUTO: 0.8 K/UL
LYMPHOCYTES # BLD AUTO: 0.8 K/UL
LYMPHOCYTES # BLD AUTO: 0.9 K/UL
LYMPHOCYTES # BLD AUTO: 1 K/UL
LYMPHOCYTES # BLD AUTO: 1.6 K/UL
LYMPHOCYTES NFR BLD: 10.9 %
LYMPHOCYTES NFR BLD: 8.2 %
LYMPHOCYTES NFR BLD: 9.1 %
LYMPHOCYTES NFR BLD: 9.2 %
LYMPHOCYTES NFR BLD: 9.2 %
LYMPHOCYTES NFR BLD: 9.3 %
LYMPHOCYTES NFR BLD: 9.3 %
MAGNESIUM SERPL-MCNC: 1.7 MG/DL
MAGNESIUM SERPL-MCNC: 1.8 MG/DL
MAGNESIUM SERPL-MCNC: 1.8 MG/DL
MAGNESIUM SERPL-MCNC: 2 MG/DL
MAGNESIUM SERPL-MCNC: 2 MG/DL
MCH RBC QN AUTO: 30.1 PG
MCH RBC QN AUTO: 30.9 PG
MCH RBC QN AUTO: 31 PG
MCH RBC QN AUTO: 31.4 PG
MCHC RBC AUTO-ENTMCNC: 33.8 G/DL
MCHC RBC AUTO-ENTMCNC: 33.8 G/DL
MCHC RBC AUTO-ENTMCNC: 33.9 G/DL
MCHC RBC AUTO-ENTMCNC: 33.9 G/DL
MCHC RBC AUTO-ENTMCNC: 34.3 G/DL
MCHC RBC AUTO-ENTMCNC: 35 G/DL
MCHC RBC AUTO-ENTMCNC: 35 G/DL
MCV RBC AUTO: 88 FL
MCV RBC AUTO: 88 FL
MCV RBC AUTO: 89 FL
MCV RBC AUTO: 91 FL
MCV RBC AUTO: 92 FL
MODE: ABNORMAL
MONOCYTES # BLD AUTO: 0.8 K/UL
MONOCYTES # BLD AUTO: 0.9 K/UL
MONOCYTES # BLD AUTO: 1.1 K/UL
MONOCYTES NFR BLD: 7.5 %
MONOCYTES NFR BLD: 7.6 %
MONOCYTES NFR BLD: 7.6 %
MONOCYTES NFR BLD: 7.8 %
MONOCYTES NFR BLD: 7.8 %
MONOCYTES NFR BLD: 9 %
MONOCYTES NFR BLD: 9 %
NEUTROPHILS # BLD AUTO: 10 K/UL
NEUTROPHILS # BLD AUTO: 11.2 K/UL
NEUTROPHILS # BLD AUTO: 6.8 K/UL
NEUTROPHILS # BLD AUTO: 6.8 K/UL
NEUTROPHILS # BLD AUTO: 8.4 K/UL
NEUTROPHILS # BLD AUTO: 8.6 K/UL
NEUTROPHILS # BLD AUTO: 8.6 K/UL
NEUTROPHILS NFR BLD: 76.6 %
NEUTROPHILS NFR BLD: 79.5 %
NEUTROPHILS NFR BLD: 79.5 %
NEUTROPHILS NFR BLD: 81.1 %
NEUTROPHILS NFR BLD: 81.1 %
NEUTROPHILS NFR BLD: 81.4 %
NEUTROPHILS NFR BLD: 81.8 %
NRBC BLD-RTO: 0 /100 WBC
PCO2 BLDA: 31.1 MMHG (ref 35–45)
PH SMN: 7.52 [PH] (ref 7.35–7.45)
PHOSPHATE SERPL-MCNC: 1.6 MG/DL
PHOSPHATE SERPL-MCNC: 2.1 MG/DL
PHOSPHATE SERPL-MCNC: 2.1 MG/DL
PHOSPHATE SERPL-MCNC: 2.6 MG/DL
PHOSPHATE SERPL-MCNC: 2.6 MG/DL
PLATELET # BLD AUTO: 42 K/UL
PLATELET # BLD AUTO: 43 K/UL
PLATELET # BLD AUTO: 43 K/UL
PLATELET # BLD AUTO: 44 K/UL
PLATELET # BLD AUTO: 44 K/UL
PLATELET # BLD AUTO: 46 K/UL
PLATELET # BLD AUTO: 51 K/UL
PMV BLD AUTO: 11.5 FL
PMV BLD AUTO: 11.7 FL
PMV BLD AUTO: 11.7 FL
PMV BLD AUTO: 11.9 FL
PMV BLD AUTO: 12 FL
PMV BLD AUTO: 12 FL
PMV BLD AUTO: 12.4 FL
PO2 BLDA: 88 MMHG (ref 80–100)
POC BE: 2 MMOL/L
POC SATURATED O2: 98 % (ref 95–100)
POC TCO2: 26 MMOL/L (ref 23–27)
POCT GLUCOSE: 110 MG/DL (ref 70–110)
POCT GLUCOSE: 117 MG/DL (ref 70–110)
POCT GLUCOSE: 131 MG/DL (ref 70–110)
POCT GLUCOSE: 135 MG/DL (ref 70–110)
POCT GLUCOSE: 137 MG/DL (ref 70–110)
POCT GLUCOSE: 147 MG/DL (ref 70–110)
POCT GLUCOSE: 148 MG/DL (ref 70–110)
POCT GLUCOSE: 150 MG/DL (ref 70–110)
POCT GLUCOSE: 151 MG/DL (ref 70–110)
POCT GLUCOSE: 155 MG/DL (ref 70–110)
POCT GLUCOSE: 167 MG/DL (ref 70–110)
POTASSIUM SERPL-SCNC: 4.5 MMOL/L
POTASSIUM SERPL-SCNC: 4.5 MMOL/L
POTASSIUM SERPL-SCNC: 4.6 MMOL/L
POTASSIUM SERPL-SCNC: 4.7 MMOL/L
POTASSIUM SERPL-SCNC: 4.7 MMOL/L
PROT SERPL-MCNC: 4.2 G/DL
PROT SERPL-MCNC: 4.2 G/DL
PROTHROMBIN TIME: 12.1 SEC
RBC # BLD AUTO: 2.46 M/UL
RBC # BLD AUTO: 2.46 M/UL
RBC # BLD AUTO: 2.55 M/UL
RBC # BLD AUTO: 2.56 M/UL
RBC # BLD AUTO: 2.56 M/UL
RBC # BLD AUTO: 2.69 M/UL
RBC # BLD AUTO: 2.74 M/UL
SAMPLE: ABNORMAL
SITE: ABNORMAL
SODIUM SERPL-SCNC: 137 MMOL/L
SODIUM SERPL-SCNC: 138 MMOL/L
SP02: 99
TACROLIMUS BLD-MCNC: 7.1 NG/ML
TRANS ERYTHROCYTES VOL PATIENT: NORMAL ML
WBC # BLD AUTO: 10.33 K/UL
WBC # BLD AUTO: 10.58 K/UL
WBC # BLD AUTO: 10.58 K/UL
WBC # BLD AUTO: 12.19 K/UL
WBC # BLD AUTO: 14.63 K/UL
WBC # BLD AUTO: 8.57 K/UL
WBC # BLD AUTO: 8.57 K/UL

## 2018-11-15 PROCEDURE — S0028 INJECTION, FAMOTIDINE, 20 MG: HCPCS | Performed by: TRANSPLANT SURGERY

## 2018-11-15 PROCEDURE — 83605 ASSAY OF LACTIC ACID: CPT

## 2018-11-15 PROCEDURE — 63600175 PHARM REV CODE 636 W HCPCS: Performed by: TRANSPLANT SURGERY

## 2018-11-15 PROCEDURE — 99233 SBSQ HOSP IP/OBS HIGH 50: CPT | Mod: ,,, | Performed by: NURSE PRACTITIONER

## 2018-11-15 PROCEDURE — 90945 DIALYSIS ONE EVALUATION: CPT

## 2018-11-15 PROCEDURE — P9021 RED BLOOD CELLS UNIT: HCPCS

## 2018-11-15 PROCEDURE — 37799 UNLISTED PX VASCULAR SURGERY: CPT

## 2018-11-15 PROCEDURE — 83735 ASSAY OF MAGNESIUM: CPT | Mod: 91

## 2018-11-15 PROCEDURE — 85730 THROMBOPLASTIN TIME PARTIAL: CPT

## 2018-11-15 PROCEDURE — 80053 COMPREHEN METABOLIC PANEL: CPT | Mod: 91

## 2018-11-15 PROCEDURE — 99233 PR SUBSEQUENT HOSPITAL CARE,LEVL III: ICD-10-PCS | Mod: ,,, | Performed by: NURSE PRACTITIONER

## 2018-11-15 PROCEDURE — 99233 PR SUBSEQUENT HOSPITAL CARE,LEVL III: ICD-10-PCS | Mod: ,,, | Performed by: INTERNAL MEDICINE

## 2018-11-15 PROCEDURE — 63600175 PHARM REV CODE 636 W HCPCS: Performed by: HOSPITALIST

## 2018-11-15 PROCEDURE — 99232 SBSQ HOSP IP/OBS MODERATE 35: CPT | Mod: 24,,, | Performed by: SURGERY

## 2018-11-15 PROCEDURE — 80100008 HC CRRT DAILY MAINTENANCE

## 2018-11-15 PROCEDURE — 85610 PROTHROMBIN TIME: CPT

## 2018-11-15 PROCEDURE — 20000000 HC ICU ROOM

## 2018-11-15 PROCEDURE — 85025 COMPLETE CBC W/AUTO DIFF WBC: CPT

## 2018-11-15 PROCEDURE — 80069 RENAL FUNCTION PANEL: CPT | Mod: 91

## 2018-11-15 PROCEDURE — 25000003 PHARM REV CODE 250: Performed by: TRANSPLANT SURGERY

## 2018-11-15 PROCEDURE — 25000003 PHARM REV CODE 250: Performed by: NURSE PRACTITIONER

## 2018-11-15 PROCEDURE — 99232 PR SUBSEQUENT HOSPITAL CARE,LEVL II: ICD-10-PCS | Mod: ,,, | Performed by: INTERNAL MEDICINE

## 2018-11-15 PROCEDURE — 80197 ASSAY OF TACROLIMUS: CPT

## 2018-11-15 PROCEDURE — 63600175 PHARM REV CODE 636 W HCPCS: Performed by: NURSE PRACTITIONER

## 2018-11-15 PROCEDURE — 99233 SBSQ HOSP IP/OBS HIGH 50: CPT | Mod: ,,, | Performed by: INTERNAL MEDICINE

## 2018-11-15 PROCEDURE — 80053 COMPREHEN METABOLIC PANEL: CPT

## 2018-11-15 PROCEDURE — 99232 SBSQ HOSP IP/OBS MODERATE 35: CPT | Mod: ,,, | Performed by: INTERNAL MEDICINE

## 2018-11-15 PROCEDURE — 25000003 PHARM REV CODE 250: Performed by: HOSPITALIST

## 2018-11-15 PROCEDURE — 82248 BILIRUBIN DIRECT: CPT

## 2018-11-15 PROCEDURE — 82977 ASSAY OF GGT: CPT

## 2018-11-15 PROCEDURE — 99232 PR SUBSEQUENT HOSPITAL CARE,LEVL II: ICD-10-PCS | Mod: 24,,, | Performed by: SURGERY

## 2018-11-15 PROCEDURE — 25000003 PHARM REV CODE 250: Performed by: PEDIATRICS

## 2018-11-15 RX ORDER — IBUPROFEN 200 MG
24 TABLET ORAL
Status: DISCONTINUED | OUTPATIENT
Start: 2018-11-15 | End: 2018-11-16

## 2018-11-15 RX ORDER — HYDROCODONE BITARTRATE AND ACETAMINOPHEN 500; 5 MG/1; MG/1
TABLET ORAL CONTINUOUS
Status: ACTIVE | OUTPATIENT
Start: 2018-11-15 | End: 2018-11-16

## 2018-11-15 RX ORDER — GLUCAGON 1 MG
1 KIT INJECTION
Status: DISCONTINUED | OUTPATIENT
Start: 2018-11-15 | End: 2018-11-16

## 2018-11-15 RX ORDER — MAGNESIUM SULFATE HEPTAHYDRATE 40 MG/ML
2 INJECTION, SOLUTION INTRAVENOUS
Status: DISPENSED | OUTPATIENT
Start: 2018-11-15 | End: 2018-11-16

## 2018-11-15 RX ORDER — IBUPROFEN 200 MG
16 TABLET ORAL
Status: DISCONTINUED | OUTPATIENT
Start: 2018-11-15 | End: 2018-11-16

## 2018-11-15 RX ORDER — INSULIN ASPART 100 [IU]/ML
0-4 INJECTION, SOLUTION INTRAVENOUS; SUBCUTANEOUS
Status: DISCONTINUED | OUTPATIENT
Start: 2018-11-15 | End: 2018-11-16

## 2018-11-15 RX ORDER — HYDROCODONE BITARTRATE AND ACETAMINOPHEN 500; 5 MG/1; MG/1
TABLET ORAL
Status: DISCONTINUED | OUTPATIENT
Start: 2018-11-15 | End: 2018-11-16

## 2018-11-15 RX ADMIN — SODIUM CHLORIDE: 0.9 INJECTION, SOLUTION INTRAVENOUS at 08:11

## 2018-11-15 RX ADMIN — DEXTROSE 1000 MG: 5 SOLUTION INTRAVENOUS at 09:11

## 2018-11-15 RX ADMIN — HEPARIN SODIUM 5000 UNITS: 5000 INJECTION, SOLUTION INTRAVENOUS; SUBCUTANEOUS at 10:11

## 2018-11-15 RX ADMIN — MAGNESIUM SULFATE IN WATER 2 G: 40 INJECTION, SOLUTION INTRAVENOUS at 11:11

## 2018-11-15 RX ADMIN — METHYLPREDNISOLONE SODIUM SUCCINATE 40 MG: 40 INJECTION, POWDER, FOR SOLUTION INTRAMUSCULAR; INTRAVENOUS at 09:11

## 2018-11-15 RX ADMIN — HEPARIN SODIUM 5000 UNITS: 5000 INJECTION, SOLUTION INTRAVENOUS; SUBCUTANEOUS at 04:11

## 2018-11-15 RX ADMIN — SODIUM CHLORIDE 0.5 UNITS/HR: 9 INJECTION, SOLUTION INTRAVENOUS at 09:11

## 2018-11-15 RX ADMIN — FLUCONAZOLE IN SODIUM CHLORIDE 200 MG: 2 INJECTION, SOLUTION INTRAVENOUS at 05:11

## 2018-11-15 RX ADMIN — FAMOTIDINE 20 MG: 10 INJECTION, SOLUTION INTRAVENOUS at 09:11

## 2018-11-15 NOTE — SUBJECTIVE & OBJECTIVE
Scheduled Meds:   albumin human 5%  25 g Intravenous Once    albumin human 5%  25 g Intravenous Once    famotidine (PF)  20 mg Intravenous Daily    fluconazole (DIFLUCAN) IVPB  200 mg Intravenous Q24H    heparin (porcine)  5,000 Units Subcutaneous Q8H    methylPREDNISolone sodium succinate  40 mg Intravenous Q12H    Followed by    [START ON 11/16/2018] methylPREDNISolone sodium succinate  20 mg Intravenous Q12H    Followed by    [START ON 11/17/2018] predniSONE  20 mg Oral Daily    mycophenolate (CELLCEPT) IVPB  1,000 mg Intravenous Daily    [START ON 11/21/2018] valganciclovir 50 mg/ml  450 mg Per NG tube Daily     Continuous Infusions:   sodium chloride 0.9% 200 mL/hr at 11/15/18 1300    insulin (HUMAN R) infusion (adults) 0.5 Units/hr (11/15/18 1300)    norepinephrine bitartrate-D5W Stopped (11/14/18 1500)    propofol Stopped (11/12/18 1500)    vasopressin (PITRESSIN) infusion Stopped (11/11/18 1600)     PRN Meds:sodium chloride, sodium chloride, sodium chloride, dextrose 50%, dextrose 50%, glucagon (human recombinant), glucose, glucose, insulin aspart U-100, magnesium sulfate IVPB, oxyCODONE, oxyCODONE, ramelteon    Review of Systems  Objective:     Vital Signs (Most Recent):  Temp: 98.1 °F (36.7 °C) (11/15/18 1115)  Pulse: 94 (11/15/18 1500)  Resp: (!) 23 (11/15/18 1500)  BP: 117/73 (11/15/18 1400)  SpO2: 98 % (11/15/18 1500) Vital Signs (24h Range):  Temp:  [97.5 °F (36.4 °C)-98.8 °F (37.1 °C)] 98.1 °F (36.7 °C)  Pulse:  [] 94  Resp:  [15-47] 23  SpO2:  [95 %-99 %] 98 %  BP: (109-125)/(62-83) 117/73  Arterial Line BP: ()/(55-83) 124/67     Weight: 98.1 kg (216 lb 4.3 oz)  Body mass index is 31.03 kg/m².    Intake/Output - Last 3 Shifts       11/13 0700 - 11/14 0659 11/14 0700 - 11/15 0659 11/15 0700 - 11/16 0659    P.O. 890      I.V. (mL/kg) 804 (8.7) 1113.9 (11.4) 2311 (23.6)    Blood 315 217     IV Piggyback  250     Total Intake(mL/kg) 2009 (21.8) 1580.9 (16.1) 2311 (23.6)     Urine (mL/kg/hr) 0 (0) 0 (0) 5 (0)    Drains  810     Other 530 3602 3039    Stool 0  0    Total Output 530 4412 3044    Net +1479 -2831.1 -733           Stool Occurrence 2 x  1 x          Physical Exam   Constitutional: He is oriented to person, place, and time. He appears well-developed.   jaundiced   HENT:   Head: Normocephalic and atraumatic.   Eyes: Scleral icterus is present.   Cardiovascular: Normal rate, regular rhythm and intact distal pulses.   Pulmonary/Chest: Effort normal. No respiratory distress.   Abdominal: Soft. He exhibits no distension.   Incision with dried blood, c/d/i  Dressing over former site of right drain; ostomy bag over former left sided drain sites, collecting SS output   Musculoskeletal: He exhibits edema.   Neurological: He is alert and oriented to person, place, and time.   Skin: Skin is warm and dry.   Jaundice improving       Laboratory:  Immunosuppressants         Stop Route Frequency     mycophenolate (CELLCEPT) 1,000 mg in dextrose 5 % 250 mL IVPB      -- IV Daily        Labs within the past 24 hours have been reviewed.    Diagnostic Results:  I have personally reviewed all pertinent imaging studies.

## 2018-11-15 NOTE — ASSESSMENT & PLAN NOTE
S/P liver transplant     52 y/o man with ESLD, ESRD and DM2 now s/p liver transplant     Neuro:   - Sedation: none  - Pain control: PRN fentanyl     Pulmonary:   - RA; O2 as required  - daily cxr  - duonebs prn     Cardiac:  - Drips: none  - HDS     Renal:   -CRRT overnight; reevaluate needs today  -trend BMP  - BUN/Cr: 13/1.5 -> 35/2.7-> 31/2.1  - Nephrology on board, appreciate recommendations     Infectious Disease:   - AF  - Trend WBC - 12 -> 13->12  - Abx: Cipro (UCx 11/11 grew Enterococcus)  - Prophylaxis per transplant - Valcyte, Diflucan  - IS per Transplant - MMF, Prograf     Hematology/Oncology:  - H&H 7.4/20.8 -> 8.0/23 after 2U pRBCs -> 6.0 today; transfuse 2U pRBCs; h/h 8.1/23.9 s/p 2 units pRBC  - Plt 84 -> 59 -> 50 -> 63 (after 1 U Plt) -> 52->51  - INR 1.5  - Trend CBC     Endocrine:  - BG increased 170s -> 300s on insulin gtt  - SSI as gtt weaned  - Endocrinology on board, appreciate recommendations     Fluids/Electrolytes/Nutrition/GI:   - renal diet  - Trend CMP  - Replace Lytes PRN  - liver US 11/12 - 6.6 cm complex fluid collection deep to left lobe; increased flow velocity in main hepatic artery      Prophylaxis:  - SQH  - Famotidine     Dispo:  Continue ICU care - possibly stepdown pending Nephrology recs for CRRT requirements  Primary team:Transplant

## 2018-11-15 NOTE — PROGRESS NOTES
"[facility]  Endocrinology  Progress Note    Admit Date: 10/27/2018     Reason for Consult: Management of Hyperglycemia     Surgical Procedure and Date: liver transplant 11/11/18    HPI:   Patient is a 53 y.o. male with a diagnosis of ESLD secondary to ETOH abuse and DEL with ESRD with RRT. Patient is now s/p liver transplant. Endocrinology consulted for BG management.  Patient reports having DM x 1 year, takes Metformin 1000 mg BID.      Lab Results   Component Value Date    HGBA1C 4.4 11/09/2018             Interval HPI:   Overnight events: Remains in ICU, mild confusion overnight.  BG  well controlled overnight on intensive IV insulin protocl.  Solu medrol 40 mg BID, standard steroid taper.  On IV antibiotics.  CRRT overnight per nephrology.  Eating:   NPO  Nausea: No  Hypoglycemia and intervention: No  Fever: No  TPN and/or TF: No    /68   Pulse 89   Temp 97.7 °F (36.5 °C) (Oral)   Resp (!) 24   Ht 5' 10" (1.778 m)   Wt 98.1 kg (216 lb 4.3 oz)   SpO2 99%   BMI 31.03 kg/m²      Labs Reviewed and Include    Recent Labs   Lab 11/15/18  0512   *  136*  136*   CALCIUM 8.0*  8.0*  8.0*   ALBUMIN 2.4*  2.4*  2.4*   PROT 4.2*     137  137   K 4.6  4.6  4.6   CO2 25  25  25     103  103   BUN 18  18  18   CREATININE 1.4  1.4  1.4   ALKPHOS 201*   *   *   BILITOT 12.1*     Lab Results   Component Value Date    WBC 10.58 11/15/2018    WBC 10.58 11/15/2018    HGB 7.9 (L) 11/15/2018    HGB 7.9 (L) 11/15/2018    HCT 23.4 (L) 11/15/2018    HCT 23.4 (L) 11/15/2018    MCV 91 11/15/2018    MCV 91 11/15/2018    PLT 44 (L) 11/15/2018    PLT 44 (L) 11/15/2018     No results for input(s): TSH, FREET4 in the last 168 hours.  Lab Results   Component Value Date    HGBA1C 4.4 11/09/2018       Nutritional status:   Body mass index is 31.03 kg/m².  Lab Results   Component Value Date    ALBUMIN 2.4 (L) 11/15/2018    ALBUMIN 2.4 (L) 11/15/2018    ALBUMIN 2.4 " (L) 11/15/2018     Lab Results   Component Value Date    PREALBUMIN 7 (L) 10/27/2018       Estimated Creatinine Clearance: 71.6 mL/min (based on SCr of 1.4 mg/dL).    Accu-Checks  Recent Labs     11/14/18  1802 11/14/18  1921 11/14/18  2145 11/14/18  2218 11/15/18  0050 11/15/18  0233 11/15/18  0309 11/15/18  0517 11/15/18  0624 11/15/18  0733   POCTGLUCOSE 304* 249* 172* 167* 151* 167* 150* 135* 131* 147*       Current Medications and/or Treatments Impacting Glycemic Control  Immunotherapy:    Immunosuppressants         Stop Route Frequency     mycophenolate (CELLCEPT) 1,000 mg in dextrose 5 % 250 mL IVPB      -- IV Daily        Steroids:   Hormones (From admission, onward)    Start     Stop Route Frequency Ordered    11/17/18 0900  predniSONE tablet 20 mg  (methylprednisolone taper panel)      -- Oral Daily 11/11/18 1208    11/16/18 0900  methylPREDNISolone sodium succinate injection 20 mg  (methylprednisolone taper panel)      11/17 0859 IV Every 12 hours 11/11/18 1208    11/15/18 0900  methylPREDNISolone sodium succinate injection 40 mg  (methylprednisolone taper panel)      11/16 0859 IV Every 12 hours 11/11/18 1208    11/11/18 1445  vasopressin (PITRESSIN) 0.2 Units/mL in dextrose 5 % 100 mL infusion      -- IV Continuous 11/11/18 1337    11/09/18 1345  methylPREDNISolone sodium succinate injection 500 mg  (Med - Immunosuppression Induction Therapy (Methylprednisolone))      11/10 0144 IV Once pre-op 11/09/18 1236        Pressors:    Autonomic Drugs (From admission, onward)    Start     Stop Route Frequency Ordered    11/11/18 1335  norepinephrine 4 mg in dextrose 5% 250 mL infusion (premix) (titrating)     Question Answer Comment   Titrate by: (in mcg/kg/min) 0.01    Titrate interval: (in minutes) 2    Titrate to maintain: (MAP or SBP) MAP    Greater than: (in mmHg) 65    Maximum dose: (in mcg/kg/min) 2        -- IV Continuous 11/11/18 1337        Hyperglycemia/Diabetes Medications:   Antihyperglycemics (From  admission, onward)    Start     Stop Route Frequency Ordered    11/14/18 1815  insulin regular (Humulin R) 100 Units in sodium chloride 0.9% 100 mL infusion     Question:  Insulin Rate Adjustment (DO NOT MODIFY ANSWER)  Answer:  [email or web address]    -- IV Continuous 11/14/18 1710          ASSESSMENT and PLAN    * S/P liver transplant    Managed per LTS.   avoid hypoglycemia  Optimize BG control for surgical wound healing.     Lab Results   Component Value Date     (H) 11/15/2018     (H) 11/15/2018     (H) 11/15/2018    ALKPHOS 201 (H) 11/15/2018    BILITOT 12.1 (H) 11/15/2018            Type 2 diabetes mellitus without complication    BG goal 140-180    Discontinue intensive IV insulin protocl  Start transition IV insulin infusion at 0.5 u/hr  Low dose corrections scale  Change BG monitoring to q 4 hours    Discharge planning: TBD       Adrenal cortical steroids causing adverse effect in therapeutic use    On standard steroid taper per transplant team; may elevate BG readings         DEL (acute kidney injury)    Avoid insulin stacking and hypoglycemia.  CRRT per nephrology  Lab Results   Component Value Date    CREATININE 1.4 11/15/2018    CREATININE 1.4 11/15/2018    CREATININE 1.4 11/15/2018            Hepatorenal syndrome    Avoid insulin stacking and hypoglycemia.         Prophylactic immunotherapy    May increase insulin resistance.        Overweight (BMI 25.0-29.9)    may contribute to insulin resistance  Body mass index is 31.03 kg/m².             Zoran Almeida NP  Endocrinology  [facility]

## 2018-11-15 NOTE — PROGRESS NOTES
[facility]  Nephrology  Progress Note    Patient Name: Femi Enciso  MRN: [id number]  Admission Date: 10/27/2018  Hospital Length of Stay: 18 days  Attending Provider: Femi Patel MD   Primary Care Physician: Primary Doctor No  Principal Problem:S/P liver transplant    Subjective:     HPI: 52 y/o man with DM2 presents to the ED with family for liver failure (likely due to EtOH abundant history of drinking - diagnosed in Sept 2018 in [place]).  He reports jaundice, generalized weakness, nausea, diarrhea, and decreased appetite since Sept 2018.  He is from [place], [place], and Dr. Sharma (patients physician) recommended bringing him to hospital for evaluation.  Patient denies any fever, chills, vomiting, chest pain, palpitations, SOB, abdominal pain.      Nephrology consulted for evaluation/management Adri.     Interval History:   NAEON, off SLED overnight. This am he is more confused and disoriented. Oxygenation remains stable on RA. CVP stable CVP 5. Hb stable this am no plans for transfusion. Remains anuric. Off pressors. FLuid balance is Net gain 1559 ml. Hg dropped to 6.0 today suspect bleeding US confirms fluid collection suspicious for hematoma. Transfused 3 units of PRBC's.  Review of patient's allergies indicates:   Allergen Reactions    Penicillins Nausea And Vomiting and Rash     Current Facility-Administered Medications   Medication Frequency    0.9%  NaCl infusion (CRRT USE ONLY) Continuous    0.9%  NaCl infusion (for blood administration) Q24H PRN    0.9%  NaCl infusion (for blood administration) Q24H PRN    0.9%  NaCl infusion (for blood administration) Q24H PRN    albumin human 5% bottle 25 g Once    albumin human 5% bottle 25 g Once    dextrose 50% injection 12.5 g PRN    dextrose 50% injection 25 g PRN    famotidine (PF) injection 20 mg Daily    fluconazole (DIFLUCAN) IVPB 200 mg 100 mL Q24H    haloperidol lactate (HALDOL) 5 mg/mL injection     heparin (porcine) injection  5,000 Units Q8H    insulin regular (Humulin R) 100 Units in sodium chloride 0.9% 100 mL infusion Continuous    magnesium sulfate 2g in water 50mL IVPB (premix) PRN    methylPREDNISolone sodium succinate injection 60 mg Q12H    Followed by    [START ON 11/15/2018] methylPREDNISolone sodium succinate injection 40 mg Q12H    Followed by    [START ON 11/16/2018] methylPREDNISolone sodium succinate injection 20 mg Q12H    Followed by    [START ON 11/17/2018] predniSONE tablet 20 mg Daily    mycophenolate (CELLCEPT) 1,000 mg in dextrose 5 % 250 mL IVPB Daily    norepinephrine 4 mg in dextrose 5% 250 mL infusion (premix) (titrating) Continuous    oxyCODONE immediate release tablet 5 mg Q6H PRN    oxyCODONE immediate release tablet Tab 10 mg Q4H PRN    propofol (DIPRIVAN) 10 mg/mL infusion Continuous    ramelteon tablet 8 mg Nightly PRN    sodium phosphate 20.01 mmol in dextrose 5 % 250 mL IVPB PRN    sodium phosphate 30 mmol in dextrose 5 % 250 mL IVPB PRN    sodium phosphate 39.99 mmol in dextrose 5 % 250 mL IVPB PRN    [START ON 11/21/2018] valganciclovir 50 mg/ml oral solution 450 mg Daily    vasopressin (PITRESSIN) 0.2 Units/mL in dextrose 5 % 100 mL infusion Continuous       Objective:     Vital Signs (Most Recent):  Temp: 97.5 °F (36.4 °C) (11/14/18 1617)  Pulse: 96 (11/14/18 1700)  Resp: (!) 25 (11/14/18 1700)  BP: 100/68 (11/14/18 1500)  SpO2: 98 % (11/14/18 1700)  O2 Device (Oxygen Therapy): room air (11/14/18 0600) Vital Signs (24h Range):  Temp:  [97.5 °F (36.4 °C)-98.5 °F (36.9 °C)] 97.5 °F (36.4 °C)  Pulse:  [] 96  Resp:  [8-47] 25  SpO2:  [93 %-99 %] 98 %  BP: ()/(56-68) 100/68  Arterial Line BP: ()/(46-71) 109/61     Weight: 92 kg (202 lb 13.2 oz) (11/14/18 0500)  Body mass index is 29.1 kg/m².  Body surface area is 2.13 meters squared.    I/O last 3 completed shifts:  In: 4504.8 [P.O.:890; I.V.:3299.8; Blood:315]  Out: 3640 [Urine:5; Drains:624; Other:3011]    Physical  Exam   Constitutional: He appears well-developed. He appears cachectic. He is cooperative. No distress. Nasal cannula in place.   AAOx3   HENT:   Head: Normocephalic and atraumatic.   Eyes: Conjunctivae are normal. Pupils are equal, round, and reactive to light.   Neck: Trachea normal. Neck supple. No JVD present.   Cardiovascular: Normal rate, regular rhythm, S1 normal, S2 normal and normal pulses. Exam reveals no gallop and no friction rub.   No murmur heard.  Pulmonary/Chest: Effort normal. He has decreased breath sounds in the right lower field and the left lower field. He has no rhonchi.   Abdominal: Soft. He exhibits distension. He exhibits no ascites. Bowel sounds are decreased. There is no tenderness.       Musculoskeletal: Normal range of motion. He exhibits edema (edema+ trace pitting sacral).   Neurological:   Awake alert and Oriented x 3   Skin: Skin is warm and dry. Capillary refill takes less than 2 seconds.   Psychiatric: He has a normal mood and affect. His behavior is normal.   Vitals reviewed.      Significant Labs:  ABGs:   Recent Labs   Lab 11/13/18  0453   PH 7.492*   PCO2 34.5*   HCO3 26.4   POCSATURATED 93*   BE 3     BMP:   Recent Labs   Lab 11/14/18  1408   *   CL 99   CO2 22*   BUN 51*   CREATININE 3.3*   CALCIUM 7.8*   MG 2.4     Cardiac Markers: No results for input(s): CKMB, TROPONINT, MYOGLOBIN in the last 168 hours.  CBC:   Recent Labs   Lab 11/14/18  1408   WBC 19.84*   RBC 2.40*   HGB 7.3*   HCT 21.8*   *   MCV 91   MCH 30.4   MCHC 33.5     CMP:   Recent Labs   Lab 11/14/18  0550 11/14/18  1408   * 349*   CALCIUM 8.0* 7.8*   ALBUMIN 2.3* 2.3*   PROT 4.0*  --    * 132*   K 4.8 5.2*   CO2 23 22*   CL 99 99   BUN 44* 51*   CREATININE 3.0* 3.3*   ALKPHOS 137*  --    *  --    AST 86*  --    BILITOT 10.2*  --      Coagulation:   Recent Labs   Lab 11/13/18  0406 11/14/18  0550   INR 1.5*  --    APTT 37.7* 29.3     Recent Labs   Lab 11/09/18  1737   MANN  Green*   SPECGRAV 1.025   PHUR 6.5   PROTEINUA 2+*   BACTERIA Occasional   NITRITE Negative   LEUKOCYTESUR Trace*   HYALINECASTS 0     All labs within the past 24 hours have been reviewed.     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   - s/p OHLTx 11/11/2018   - remains anuric   - Had intra-op SLED  - CVP 1 now 5    Plan:  - Off RRT overnight   - MS today was suggestion of encephalopathy and possible Uremia  - Will do SELD for metabolic clearance and volume management overnight and reaccess in am   - -350 ml/hr as tolerated  - Remains anuric  - Strict I/O and chart  - Avoid nephrotoxic medications  - please keep Hb > 7 gm/dL or higher if symptomatic  - Medication doses adjusted to GFR           Thank you for your consult. I will follow-up with patient. Please contact us if you have any additional questions.    Nikolay Nino MD  Nephrology  [facility]

## 2018-11-15 NOTE — ASSESSMENT & PLAN NOTE
Nutrition Problem  Malnutrition in the context of Chronic Illness/Injury    Related to (etiology):  Cirrhosis and poor appetite    Signs and Symptoms (as evidenced by):  Energy Intake: <75% of estimated energy requirement for 2 months  Body Fat Depletion: moderate depletion of orbitals and triceps   Muscle Mass Depletion: moderate depletion of temples, clavicle region and scapular region   Weight Loss: 14% x 2 months (per patient, unable to verify per chart review)    Interventions/Recommendations (treatment strategy):  Full assessment completed, see RD Note 11/15/2018.    Nutrition Diagnosis Status:  Continues

## 2018-11-15 NOTE — PROGRESS NOTES
[facility]  Liver Transplant  Progress Note    Patient Name: Femi Enciso  MRN: [id number]  Admission Date: 10/27/2018  Hospital Length of Stay: 19 days  Code Status: Full Code  Primary Care Provider: Primary Doctor No  Post-Op Day 4 Days Post-Op    ORGAN:   LIVER  Disease Etiology: Alcoholic Cirrhosis  Donor Type:    - Brain Death  CDC High Risk:   No  Donor CMV Status:   Donor CMV Status: Positive  Donor HBcAB:   Negative  Donor HCV Status:   Negative  Donor HBV JAVIER: Negative  Donor HCV JAVIER: Negative  Whole or Partial: Whole Liver  Biliary Anastomosis: End to End  Arterial Anatomy: Standard    Subjective:     Scheduled Meds:   albumin human 5%  25 g Intravenous Once    albumin human 5%  25 g Intravenous Once    famotidine (PF)  20 mg Intravenous Daily    fluconazole (DIFLUCAN) IVPB  200 mg Intravenous Q24H    heparin (porcine)  5,000 Units Subcutaneous Q8H    methylPREDNISolone sodium succinate  40 mg Intravenous Q12H    Followed by    [START ON 2018] methylPREDNISolone sodium succinate  20 mg Intravenous Q12H    Followed by    [START ON 2018] predniSONE  20 mg Oral Daily    mycophenolate (CELLCEPT) IVPB  1,000 mg Intravenous Daily    [START ON 2018] valganciclovir 50 mg/ml  450 mg Per NG tube Daily     Continuous Infusions:   sodium chloride 0.9% 200 mL/hr at 11/15/18 1300    insulin (HUMAN R) infusion (adults) 0.5 Units/hr (11/15/18 1300)    norepinephrine bitartrate-D5W Stopped (18 1500)    propofol Stopped (18 1500)    vasopressin (PITRESSIN) infusion Stopped (18 1600)     PRN Meds:sodium chloride, sodium chloride, sodium chloride, dextrose 50%, dextrose 50%, glucagon (human recombinant), glucose, glucose, insulin aspart U-100, magnesium sulfate IVPB, oxyCODONE, oxyCODONE, ramelteon    Review of Systems  Objective:     Vital Signs (Most Recent):  Temp: 98.1 °F (36.7 °C) (11/15/18 1115)  Pulse: 94 (11/15/18 1500)  Resp: (!) 23 (11/15/18  1500)  BP: 117/73 (11/15/18 1400)  SpO2: 98 % (11/15/18 1500) Vital Signs (24h Range):  Temp:  [97.5 °F (36.4 °C)-98.8 °F (37.1 °C)] 98.1 °F (36.7 °C)  Pulse:  [] 94  Resp:  [15-47] 23  SpO2:  [95 %-99 %] 98 %  BP: (109-125)/(62-83) 117/73  Arterial Line BP: ()/(55-83) 124/67     Weight: 98.1 kg (216 lb 4.3 oz)  Body mass index is 31.03 kg/m².    Intake/Output - Last 3 Shifts       11/13 0700 - 11/14 0659 11/14 0700 - 11/15 0659 11/15 0700 - 11/16 0659    P.O. 890      I.V. (mL/kg) 804 (8.7) 1113.9 (11.4) 2311 (23.6)    Blood 315 217     IV Piggyback  250     Total Intake(mL/kg) 2009 (21.8) 1580.9 (16.1) 2311 (23.6)    Urine (mL/kg/hr) 0 (0) 0 (0) 5 (0)    Drains  810     Other 530 3602 3039    Stool 0  0    Total Output 530 4412 3044    Net +1479 -2831.1 -733           Stool Occurrence 2 x  1 x          Physical Exam   Constitutional: He is oriented to person, place, and time. He appears well-developed.   jaundiced   HENT:   Head: Normocephalic and atraumatic.   Eyes: Scleral icterus is present.   Cardiovascular: Normal rate, regular rhythm and intact distal pulses.   Pulmonary/Chest: Effort normal. No respiratory distress.   Abdominal: Soft. He exhibits no distension.   Incision with dried blood, c/d/i  Dressing over former site of right drain; ostomy bag over former left sided drain sites, collecting SS output   Musculoskeletal: He exhibits edema.   Neurological: He is alert and oriented to person, place, and time.   Skin: Skin is warm and dry.   Jaundice improving       Laboratory:  Immunosuppressants         Stop Route Frequency     mycophenolate (CELLCEPT) 1,000 mg in dextrose 5 % 250 mL IVPB      -- IV Daily        Labs within the past 24 hours have been reviewed.    Diagnostic Results:  I have personally reviewed all pertinent imaging studies.    Assessment/Plan:   Femi Enciso is a 53 y.o. male     Derm   Acute maculopapular rash    - morbilliform rash likely from drug reaction possibly Cefepime  (10/27-10/30).  Per patient rash is very pruritic.  He has been using steroid cream w minimal relief.  He stated significant improvement w receiving Hydroxyzine.  Monitor.         Renal/   Metabolic acidosis    - well managed on sodium bicarb.  Trend daily.        DEL (acute kidney injury)    - DEL over past 2 weeks.  Nephrology was consulted.  Pt not a candidate for kidney transplant before 12-4-18.    - pt tolerating HD w no fluid removed today given hypotension.    - pt w offer for liver transplant this evening.  He will receive intra-op HD.    - cont strict I/O's.         Hematology   Coagulopathy    - to improve w transplant.  No evidence of acute bleeding, no hematemesis or BRBPR.         Endocrine   Moderate protein malnutrition    - temporal muscle wasting noted.  Decreased appetite and energy over past week worse.   Dietician consulted on admit.  Will consult post transplant as needed.         GI   * S/P liver transplant      53 year old male with history of ESLD 2/2 ETOH cirrhosis who is s/p liver transplant. POD#3    Neuro  -acute change in mental status with confusion and agitation this 11/14  -controlled with PRN ativan   -monitor neuro exam today     CV  -off pressor support   -swan tricia catheter has been removed   -monitor on telemetry     Resp  -extubated yesterday w/o issues   -continue to monitor O2 sats     Heme  -H/H stable following 2u pRBC   -will monitor H/H today  -liver ultrasound with hematoma present. Monitor drain output     ID  -monitor fever and WBC   -f/u cultures     MSK  -OOBTC  -ambulation as tolerated      FEN/GI  -replace lytes   -Continue NPO until repeat labs are stable      Endocrine  -insulin as needed. Monitor BG       -CRRT per renal     dispo  -continue ICU care.      Hepatorenal syndrome    - del past 2 weeks.  Pt on Midodrine.  Last HD 11/9/18- 0 fluid removed 2/2 hypotension.  - pt will receive intra op HD.         Jaundice    - t bili 37.5.  Monitor.       Other    Pre-transplant evaluation for liver transplant      52 y/o man with DM2, ESLD secondary to EtOH abuse in 09/2018, DEL progressing to ESRD requiring RRT. He has had worsening renal function since September 2018. His last drink was in 09/2018. He has had no surgical procedures on abdomen and has been mobilizing well till admission. He may qualify as SLK candidate in view of renal function deterioration over 6 weeks. He needs Transplant nephrology opinion about candidacy for SLK transplant. His cardiac evaluation needs to be updated and cross sectional imaging is needed prior to activation. He is  Surgically acceptable & SC risk A candidate on surgical evaluation. Addiction Psych consult shall be made during this admission.         The patients clinical status was discussed at multidisplinary rounds, involving transplant surgery, transplant medicine, pharmacy, nursing, nutrition, and social work.    Nadia Michael MD  Liver Transplant  [facility]

## 2018-11-15 NOTE — SUBJECTIVE & OBJECTIVE
Interval History: mental status much improved today    Review of Systems   Constitutional: Negative for activity change, appetite change, chills, fatigue and fever.   HENT: Negative for congestion, dental problem, mouth sores and sinus pressure.    Eyes: Negative for pain, redness and visual disturbance.   Respiratory: Negative for cough, shortness of breath and wheezing.    Cardiovascular: Negative for chest pain and leg swelling.   Gastrointestinal: Negative for abdominal distention, abdominal pain, diarrhea, nausea and vomiting.   Endocrine: Negative for polyuria.   Genitourinary: Negative for decreased urine volume, dysuria and frequency.   Musculoskeletal: Negative for joint swelling.   Skin: Negative for color change.   Allergic/Immunologic: Negative for food allergies.   Neurological: Negative for dizziness, weakness and headaches.   Hematological: Negative for adenopathy.   Psychiatric/Behavioral: Negative for agitation and confusion. The patient is not nervous/anxious.      Objective:     Vital Signs (Most Recent):  Temp: 97.8 °F (36.6 °C) (11/15/18 1505)  Pulse: 97 (11/15/18 1505)  Resp: 18 (11/15/18 1505)  BP: 115/74 (11/15/18 1505)  SpO2: 98 % (11/15/18 1505) Vital Signs (24h Range):  Temp:  [97.7 °F (36.5 °C)-98.8 °F (37.1 °C)] 97.8 °F (36.6 °C)  Pulse:  [] 97  Resp:  [15-33] 18  SpO2:  [95 %-99 %] 98 %  BP: (109-125)/(62-83) 115/74  Arterial Line BP: ()/(55-83) 126/68     Weight: 98.1 kg (216 lb 4.3 oz)  Body mass index is 31.03 kg/m².    Estimated Creatinine Clearance: 125.4 mL/min (based on SCr of 0.8 mg/dL).    Physical Exam   Constitutional: He is oriented to person, place, and time. He appears well-developed and well-nourished.   HENT:   Head: Normocephalic and atraumatic.   Mouth/Throat: Oropharynx is clear and moist.   Eyes: Conjunctivae are normal.   Neck: Neck supple.   Cardiovascular: Normal rate, regular rhythm and normal heart sounds.   No murmur heard.  Pulmonary/Chest: Effort  normal and breath sounds normal. No respiratory distress. He has no wheezes.   Abdominal: Soft. Bowel sounds are normal. He exhibits no distension. There is no tenderness.   Chevron with dressing in place   Musculoskeletal: Normal range of motion. He exhibits no edema or tenderness.   Lymphadenopathy:     He has no cervical adenopathy.   Neurological: He is alert and oriented to person, place, and time. Coordination normal.   Skin: Skin is warm and dry. No rash noted.   Psychiatric: He has a normal mood and affect. His behavior is normal.       Significant Labs:   CBC:   Recent Labs   Lab 11/15/18  0512 11/15/18  0746 11/15/18  1509   WBC 10.58  10.58 10.33 8.57  8.57   HGB 7.9*  7.9* 8.0* 7.6*  7.6*   HCT 23.4*  23.4* 23.3* 21.7*  21.7*   PLT 44*  44* 42* 43*  43*     CMP:   Recent Labs   Lab 11/14/18  0550  11/14/18  2210 11/15/18  0512 11/15/18  1509   *   < > 136  136 137  137  137 138  138   K 4.8   < > 4.8  4.8 4.6  4.6  4.6 4.7  4.7   CL 99   < > 103  103 103  103  103 106  106   CO2 23   < > 26  26 25  25  25 27  27   *   < > 162*  162* 136*  136*  136* 126*  126*   BUN 44*   < > 31*  31* 18  18  18 8  8   CREATININE 3.0*   < > 2.1*  2.1* 1.4  1.4  1.4 0.8  0.8   CALCIUM 8.0*   < > 8.0*  8.0* 8.0*  8.0*  8.0* 7.6*  7.6*   PROT 4.0*  --   --  4.2*  --    ALBUMIN 2.3*   < > 2.4*  2.4* 2.4*  2.4*  2.4* 2.2*  2.2*   BILITOT 10.2*  --   --  12.1*  --    ALKPHOS 137*  --   --  201*  --    AST 86*  --   --  117*  --    *  --   --  258*  --    ANIONGAP 9   < > 7*  7* 9  9  9 5*  5*   EGFRNONAA 22.7*   < > 34.9*  34.9* 57.0*  57.0*  57.0* >60.0  >60.0    < > = values in this interval not displayed.       Significant Imaging: I have reviewed all pertinent imaging results/findings within the past 24 hours.

## 2018-11-15 NOTE — ASSESSMENT & PLAN NOTE
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 POD#3    Neuro  -acute change in mental status with confusion and agitation this 11/14  -controlled with PRN ativan   -monitor neuro exam today     CV  -off pressor support   -swan tricia catheter has been removed   -monitor on telemetry     Resp  -extubated yesterday w/o issues   -continue to monitor O2 sats     Heme  -H/H stable following 2u pRBC   -will monitor H/H today  -liver ultrasound with hematoma present. Monitor drain output     ID  -monitor fever and WBC   -f/u cultures     MSK  -OOBTC  -ambulation as tolerated      FEN/GI  -replace lytes   -Continue NPO until repeat labs are stable      Endocrine  -insulin as needed. Monitor BG       -CRRT per renal     dispo  -continue ICU care.

## 2018-11-15 NOTE — SUBJECTIVE & OBJECTIVE
Interval History/Significant Events: On CRRT this AM. Pt rec'd 2 u pRBCs overnight, 1 unit this AM. HDS, off pressors. Accuchecks 130-150s    Follow-up For: Procedure(s) (LRB):  TRANSPLANT, LIVER (N/A)    Post-Operative Day: 4 Days Post-Op    Objective:     Vital Signs (Most Recent):  Temp: 97.7 °F (36.5 °C) (11/15/18 0715)  Pulse: 95 (11/15/18 0815)  Resp: (!) 24 (11/15/18 0815)  BP: 109/62 (11/15/18 0800)  SpO2: 98 % (11/15/18 0815) Vital Signs (24h Range):  Temp:  [97.5 °F (36.4 °C)-98.8 °F (37.1 °C)] 97.7 °F (36.5 °C)  Pulse:  [] 95  Resp:  [15-47] 24  SpO2:  [93 %-99 %] 98 %  BP: ()/(56-77) 109/62  Arterial Line BP: ()/(52-83) 135/69     Weight: 98.1 kg (216 lb 4.3 oz)  Body mass index is 31.03 kg/m².      Intake/Output Summary (Last 24 hours) at 11/15/2018 0846  Last data filed at 11/15/2018 0800  Gross per 24 hour   Intake 3891.87 ml   Output 5280 ml   Net -1388.13 ml       Physical Exam   Constitutional: He is oriented to person, place, and time. He appears well-developed.   jaundiced   HENT:   Head: Normocephalic and atraumatic.   Eyes: Scleral icterus is present.   Cardiovascular: Normal rate, regular rhythm and intact distal pulses.   Pulmonary/Chest: Effort normal. No respiratory distress.   Abdominal: Soft. He exhibits no distension.   Incision with dried blood, c/d/i  Dressing over former site of right drain; ostomy bag over former left sided drain sites, collecting SS output   Musculoskeletal: He exhibits edema.   Neurological: He is alert and oriented to person, place, and time.   Skin: Skin is warm and dry.   Jaundice improving       Lines/Drains/Airways     Central Venous Catheter Line                 Percutaneous Central Line Insertion/Assessment - double lumen  right subclavian -- days         Tunneled Central Line Insertion/Assessment - Double Lumen  11/07/18 1350 right internal jugular 7 days          Drain                 Urethral Catheter 11/11/18 0246 Straight-tip;Non-latex  16 Fr. 4 days          Arterial Line                 Arterial Line 11/11/18 0159 Left Radial 4 days                Significant Labs:    CBC/Anemia Profile:  Recent Labs   Lab 11/14/18  1747 11/15/18  0018 11/15/18  0512   WBC 13.06* 12.19 10.58  10.58   HGB 7.9* 8.1* 7.9*  7.9*   HCT 23.7* 23.9* 23.4*  23.4*   PLT 49* 51* 44*  44*   MCV 91 89 91  91   RDW 14.4 14.6* 14.9*  14.9*        Chemistries:  Recent Labs   Lab 11/14/18  0550 11/14/18  1408 11/14/18  2210 11/15/18  0512   * 132* 136  136 137  137  137   K 4.8 5.2* 4.8  4.8 4.6  4.6  4.6   CL 99 99 103  103 103  103  103   CO2 23 22* 26  26 25  25  25   BUN 44* 51* 31*  31* 18  18  18   CREATININE 3.0* 3.3* 2.1*  2.1* 1.4  1.4  1.4   CALCIUM 8.0* 7.8* 8.0*  8.0* 8.0*  8.0*  8.0*   ALBUMIN 2.3* 2.3* 2.4*  2.4* 2.4*  2.4*  2.4*   PROT 4.0*  --   --  4.2*   BILITOT 10.2*  --   --  12.1*   ALKPHOS 137*  --   --  201*   *  --   --  258*   AST 86*  --   --  117*   MG 2.5 2.4 2.2  2.2 2.0  2.0   PHOS 4.6* 5.0* 3.4  3.4 2.6*  2.6*       Significant Imaging:  I have reviewed and interpreted all pertinent imaging results/findings within the past 24 hours.

## 2018-11-15 NOTE — PROGRESS NOTES
" [facility]  Adult Nutrition  Progress Note    SUMMARY       Recommendations  Recommendation/Intervention:   1. As medically able, ADAT to Diabetic, Renal with texture per SLP.   2. TSU RD to provide post-transplant diet education when appropriate.   RD to monitor.    Goals: Patient to receive nutrition by RD follow-up  Nutrition Goal Status: new  Communication of RD Recs: (POC)    Reason for Assessment  Reason for Assessment: RD follow-up  Diagnosis: transplant/postoperative complications(s/p OLTx )  Relevant Medical History: ESLD 2/2 alcoholic cirrhosis, ESRD on HD, DM2  Interdisciplinary Rounds: did not attend  General Information Comments: Extubated . Patient has had some AMS, was refusing care. Diet was advanced for about 1 day, had good PO intake. Made NPO yesterday for ultrasound, remains NPO for possible OR for washout today. (NFPE completed 10/31, patient with moderate malnutrition.)  Nutrition Discharge Planning: TSU RD to provide post-transplant diet education when appropriate.    Nutrition Risk Screen  Nutrition Risk Screen: no indicators present    Nutrition/Diet History  Patient Reported Diet/Restrictions/Preferences: general  Do you have any cultural, spiritual, Lutheran conflicts, given your current situation?: none  Factors Affecting Nutritional Intake: NPO    Anthropometrics  Temp: 97.7 °F (36.5 °C)  Height Method: Stated  Height: 5' 10" (177.8 cm)  Height (inches): 70 in  Weight Method: Bed Scale  Weight: 98.1 kg (216 lb 4.3 oz)  Weight (lb): 216.27 lb  Ideal Body Weight (IBW), Male: 166 lb  % Ideal Body Weight, Male (lb): 119.13 lb  BMI (Calculated): 28.4  BMI Grade: 25 - 29.9 - overweight  Usual Body Weight (UBW), k.5 kg  % Usual Body Weight: 86.67  % Weight Change From Usual Weight: -13.51 %    Lab/Procedures/Meds  Pertinent Labs Reviewed: reviewed  Pertinent Labs Comments: Glu 136, POCT Glu 131-167, HgbA1c 4.4, Ca 8.0, Phos 2.6, Alb 2.4, T. bili " 12.1  Pertinent Medications Reviewed: reviewed  Pertinent Medications Comments: famotidine, methylprednisolone, insulin drip, levophed, vasopressin    Physical Findings/Assessment  Overall Physical Appearance: on oxygen therapy, loss of muscle mass  Oral/Mouth Cavity: tooth/teeth missing  Skin: edema, incision(s)    Estimated/Assessed Needs  Weight Used For Calorie Calculations: 83.7 kg (184 lb 8.4 oz)(dosing weight)  Energy Calorie Requirements (kcal): 2110 kcal/day  Energy Need Method: Otoe-St Jeor(x 1.25)  Protein Requirements:  g/day(1.0-1.2 g/kg)  Weight Used For Protein Calculations: 83.7 kg (184 lb 8.4 oz)(dosing weight)  Fluid Requirements (mL): 1 mL/kcal or per MD  Fluid Need Method: RDA Method  RDA Method (mL): 2110    Nutrition Prescription Ordered  Current Diet Order: NPO    Evaluation of Received Nutrient/Fluid Intake  I/O: -1.9L x 24hrs, +5L since admit  Comments: LBM 11/14  % Intake of Estimated Energy Needs: 0 - 25 %  % Meal Intake: NPO    Nutrition Risk  Level of Risk/Frequency of Follow-up: high(2x/week)     Assessment and Plan  Moderate protein malnutrition    Nutrition Problem  Malnutrition in the context of Chronic Illness/Injury    Related to (etiology):  Cirrhosis and poor appetite    Signs and Symptoms (as evidenced by):  Energy Intake: <75% of estimated energy requirement for 2 months  Body Fat Depletion: moderate depletion of orbitals and triceps   Muscle Mass Depletion: moderate depletion of temples, clavicle region and scapular region   Weight Loss: 14% x 2 months (per patient, unable to verify per chart review)    Interventions/Recommendations (treatment strategy):  Full assessment completed, see RD Note 11/15/2018.    Nutrition Diagnosis Status:  Continues     Monitor and Evaluation  Food and Nutrient Intake: energy intake, food and beverage intake  Food and Nutrient Adminstration: diet order  Knowledge/Beliefs/Attitudes: food and nutrition knowledge/skill  Physical Activity  and Function: nutrition-related ADLs and IADLs  Anthropometric Measurements: weight, weight change  Biochemical Data, Medical Tests and Procedures: electrolyte and renal panel, gastrointestinal profile, glucose/endocrine profile, inflammatory profile  Nutrition-Focused Physical Findings: overall appearance     Nutrition Follow-Up  RD Follow-up?: Yes

## 2018-11-15 NOTE — PROGRESS NOTES
[facility]  Infectious Disease  Progress Note    Patient Name: Femi Enciso  MRN: [id number]  Admission Date: 10/27/2018  Length of Stay: 19 days  Attending Physician: Femi Patel MD  Primary Care Provider: Primary Doctor No    Isolation Status: Contact  Assessment/Plan:      Delirium    52 y/o M h/o DM2, ETOH dependence c/b hepatitis admitted 10/27 for acute liver failure (c/b PSE, HRS, hepatic hydrothorax) and transplant evaluation  s/p DBDLT 11/11/18( CMV D+/R-, steroid induction, MMF/tacro maintenance) c/b significant blood product transfusions now extubated and ID consulted for delirium which is much improved today  - unclear etiology but VSS and no other labs concerning for invasive or severe infectious etiology  - unclear if related to VRE in urine culture at this time but given improving with no therapy would hold further abx treatment for this  - discussed with team, will sign off call back if questions or issues         Anticipated Disposition: pending    Thank you for your consult. I will sign off. Please contact us if you have any additional questions.    Clem Drake MD  Infectious Disease  [facility]    Subjective:     Principal Problem:S/P liver transplant    HPI: 52 y/o M h/o DM2, ETOH dependence c/b hepatitis admitted 10/27 for acute liver failure (c/b PSE, HRS, hepatic hydrothorax) and transplant evaluation  s/p DBDLT 11/11/18( CMV D+/R-, steroid induction, MMF/tacro maintenance) c/b significant blood product transfusions now extubated and ID consulted for delerium  Interval History: mental status much improved today    Review of Systems   Constitutional: Negative for activity change, appetite change, chills, fatigue and fever.   HENT: Negative for congestion, dental problem, mouth sores and sinus pressure.    Eyes: Negative for pain, redness and visual disturbance.   Respiratory: Negative for cough, shortness of breath and wheezing.    Cardiovascular:  Negative for chest pain and leg swelling.   Gastrointestinal: Negative for abdominal distention, abdominal pain, diarrhea, nausea and vomiting.   Endocrine: Negative for polyuria.   Genitourinary: Negative for decreased urine volume, dysuria and frequency.   Musculoskeletal: Negative for joint swelling.   Skin: Negative for color change.   Allergic/Immunologic: Negative for food allergies.   Neurological: Negative for dizziness, weakness and headaches.   Hematological: Negative for adenopathy.   Psychiatric/Behavioral: Negative for agitation and confusion. The patient is not nervous/anxious.      Objective:     Vital Signs (Most Recent):  Temp: 97.8 °F (36.6 °C) (11/15/18 1505)  Pulse: 97 (11/15/18 1505)  Resp: 18 (11/15/18 1505)  BP: 115/74 (11/15/18 1505)  SpO2: 98 % (11/15/18 1505) Vital Signs (24h Range):  Temp:  [97.7 °F (36.5 °C)-98.8 °F (37.1 °C)] 97.8 °F (36.6 °C)  Pulse:  [] 97  Resp:  [15-33] 18  SpO2:  [95 %-99 %] 98 %  BP: (109-125)/(62-83) 115/74  Arterial Line BP: ()/(55-83) 126/68     Weight: 98.1 kg (216 lb 4.3 oz)  Body mass index is 31.03 kg/m².    Estimated Creatinine Clearance: 125.4 mL/min (based on SCr of 0.8 mg/dL).    Physical Exam   Constitutional: He is oriented to person, place, and time. He appears well-developed and well-nourished.   HENT:   Head: Normocephalic and atraumatic.   Mouth/Throat: Oropharynx is clear and moist.   Eyes: Conjunctivae are normal.   Neck: Neck supple.   Cardiovascular: Normal rate, regular rhythm and normal heart sounds.   No murmur heard.  Pulmonary/Chest: Effort normal and breath sounds normal. No respiratory distress. He has no wheezes.   Abdominal: Soft. Bowel sounds are normal. He exhibits no distension. There is no tenderness.   Chevron with dressing in place   Musculoskeletal: Normal range of motion. He exhibits no edema or tenderness.   Lymphadenopathy:     He has no cervical adenopathy.   Neurological: He is alert and oriented to person,  place, and time. Coordination normal.   Skin: Skin is warm and dry. No rash noted.   Psychiatric: He has a normal mood and affect. His behavior is normal.       Significant Labs:   CBC:   Recent Labs   Lab 11/15/18  0512 11/15/18  0746 11/15/18  1509   WBC 10.58  10.58 10.33 8.57  8.57   HGB 7.9*  7.9* 8.0* 7.6*  7.6*   HCT 23.4*  23.4* 23.3* 21.7*  21.7*   PLT 44*  44* 42* 43*  43*     CMP:   Recent Labs   Lab 11/14/18  0550  11/14/18  2210 11/15/18  0512 11/15/18  1509   *   < > 136  136 137  137  137 138  138   K 4.8   < > 4.8  4.8 4.6  4.6  4.6 4.7  4.7   CL 99   < > 103  103 103  103  103 106  106   CO2 23   < > 26  26 25  25  25 27  27   *   < > 162*  162* 136*  136*  136* 126*  126*   BUN 44*   < > 31*  31* 18  18  18 8  8   CREATININE 3.0*   < > 2.1*  2.1* 1.4  1.4  1.4 0.8  0.8   CALCIUM 8.0*   < > 8.0*  8.0* 8.0*  8.0*  8.0* 7.6*  7.6*   PROT 4.0*  --   --  4.2*  --    ALBUMIN 2.3*   < > 2.4*  2.4* 2.4*  2.4*  2.4* 2.2*  2.2*   BILITOT 10.2*  --   --  12.1*  --    ALKPHOS 137*  --   --  201*  --    AST 86*  --   --  117*  --    *  --   --  258*  --    ANIONGAP 9   < > 7*  7* 9  9  9 5*  5*   EGFRNONAA 22.7*   < > 34.9*  34.9* 57.0*  57.0*  57.0* >60.0  >60.0    < > = values in this interval not displayed.       Significant Imaging: I have reviewed all pertinent imaging results/findings within the past 24 hours.

## 2018-11-15 NOTE — ASSESSMENT & PLAN NOTE
52 y/o M h/o DM2, ETOH dependence c/b hepatitis admitted 10/27 for acute liver failure (c/b PSE, HRS, hepatic hydrothorax) and transplant evaluation  s/p DBDLT 11/11/18( CMV D+/R-, steroid induction, MMF/tacro maintenance) c/b significant blood product transfusions now extubated and ID consulted for delirium which is much improved today  - unclear etiology but VSS and no other labs concerning for invasive or severe infectious etiology  - unclear if related to VRE in urine culture at this time but given improving with no therapy would hold further abx treatment for this  - discussed with team, will sign off call back if questions or issues

## 2018-11-15 NOTE — ASSESSMENT & PLAN NOTE
Managed per LTS.   avoid hypoglycemia  Optimize BG control for surgical wound healing.     Lab Results   Component Value Date     (H) 11/15/2018     (H) 11/15/2018     (H) 11/15/2018    ALKPHOS 201 (H) 11/15/2018    BILITOT 12.1 (H) 11/15/2018

## 2018-11-15 NOTE — ASSESSMENT & PLAN NOTE
BG goal 140-180    Discontinue intensive IV insulin protocl  Start transition IV insulin infusion at 0.5 u/hr  Low dose corrections scale  Change BG monitoring to q 4 hours    Discharge planning: HENNY

## 2018-11-15 NOTE — SUBJECTIVE & OBJECTIVE
"Interval HPI:   Overnight events: Remains in ICU, mild confusion overnight.  BG well controlled overnight on intensive IV insulin protocl.  Solu medrol 40 mg BID, standard steroid taper.  On IV antibiotics.  CRRT overnight per nephrology.  Eating:   NPO  Nausea: No  Hypoglycemia and intervention: No  Fever: No  TPN and/or TF: No    /68   Pulse 89   Temp 97.7 °F (36.5 °C) (Oral)   Resp (!) 24   Ht 5' 10" (1.778 m)   Wt 98.1 kg (216 lb 4.3 oz)   SpO2 99%   BMI 31.03 kg/m²     Labs Reviewed and Include    Recent Labs   Lab 11/15/18  0512   *  136*  136*   CALCIUM 8.0*  8.0*  8.0*   ALBUMIN 2.4*  2.4*  2.4*   PROT 4.2*     137  137   K 4.6  4.6  4.6   CO2 25  25  25     103  103   BUN 18  18  18   CREATININE 1.4  1.4  1.4   ALKPHOS 201*   *   *   BILITOT 12.1*     Lab Results   Component Value Date    WBC 10.58 11/15/2018    WBC 10.58 11/15/2018    HGB 7.9 (L) 11/15/2018    HGB 7.9 (L) 11/15/2018    HCT 23.4 (L) 11/15/2018    HCT 23.4 (L) 11/15/2018    MCV 91 11/15/2018    MCV 91 11/15/2018    PLT 44 (L) 11/15/2018    PLT 44 (L) 11/15/2018     No results for input(s): TSH, FREET4 in the last 168 hours.  Lab Results   Component Value Date    HGBA1C 4.4 11/09/2018       Nutritional status:   Body mass index is 31.03 kg/m².  Lab Results   Component Value Date    ALBUMIN 2.4 (L) 11/15/2018    ALBUMIN 2.4 (L) 11/15/2018    ALBUMIN 2.4 (L) 11/15/2018     Lab Results   Component Value Date    PREALBUMIN 7 (L) 10/27/2018       Estimated Creatinine Clearance: 71.6 mL/min (based on SCr of 1.4 mg/dL).    Accu-Checks  Recent Labs     11/14/18  1802 11/14/18  1921 11/14/18  2145 11/14/18  2218 11/15/18  0050 11/15/18  0233 11/15/18  0309 11/15/18  0517 11/15/18  0624 11/15/18  0733   POCTGLUCOSE 304* 249* 172* 167* 151* 167* 150* 135* 131* 147*       Current Medications and/or Treatments Impacting Glycemic Control  Immunotherapy:    Immunosuppressants         Stop Route " Frequency     mycophenolate (CELLCEPT) 1,000 mg in dextrose 5 % 250 mL IVPB      -- IV Daily        Steroids:   Hormones (From admission, onward)    Start     Stop Route Frequency Ordered    11/17/18 0900  predniSONE tablet 20 mg  (methylprednisolone taper panel)      -- Oral Daily 11/11/18 1208    11/16/18 0900  methylPREDNISolone sodium succinate injection 20 mg  (methylprednisolone taper panel)      11/17 0859 IV Every 12 hours 11/11/18 1208    11/15/18 0900  methylPREDNISolone sodium succinate injection 40 mg  (methylprednisolone taper panel)      11/16 0859 IV Every 12 hours 11/11/18 1208    11/11/18 1445  vasopressin (PITRESSIN) 0.2 Units/mL in dextrose 5 % 100 mL infusion      -- IV Continuous 11/11/18 1337    11/09/18 1345  methylPREDNISolone sodium succinate injection 500 mg  (Med - Immunosuppression Induction Therapy (Methylprednisolone))      11/10 0144 IV Once pre-op 11/09/18 1236        Pressors:    Autonomic Drugs (From admission, onward)    Start     Stop Route Frequency Ordered    11/11/18 1335  norepinephrine 4 mg in dextrose 5% 250 mL infusion (premix) (titrating)     Question Answer Comment   Titrate by: (in mcg/kg/min) 0.01    Titrate interval: (in minutes) 2    Titrate to maintain: (MAP or SBP) MAP    Greater than: (in mmHg) 65    Maximum dose: (in mcg/kg/min) 2        -- IV Continuous 11/11/18 1337        Hyperglycemia/Diabetes Medications:   Antihyperglycemics (From admission, onward)    Start     Stop Route Frequency Ordered    11/14/18 1815  insulin regular (Humulin R) 100 Units in sodium chloride 0.9% 100 mL infusion     Question:  Insulin Rate Adjustment (DO NOT MODIFY ANSWER)  Answer:  [email or web address]    -- IV Continuous 11/14/18 1710

## 2018-11-15 NOTE — PROGRESS NOTES
[facility]  Critical Care - Surgery  Progress Note    Patient Name: Femi Enciso  MRN: [id number]  Admission Date: 10/27/2018  Hospital Length of Stay: 19 days  Code Status: Full Code  Attending Provider: Femi Patel MD  Primary Care Provider: Primary Doctor No   Principal Problem: S/P liver transplant    Subjective:     Hospital/ICU Course:  11/11: s/p liver transplant  11/12 - extubated, weaned off pressors; pulled out all 3 JOSE A drains  11/13 - CRRT held overnight; 1U Plt  11/14 -2u pRBC  11/15- 1u prbc    Interval History/Significant Events: On CRRT this AM. Pt rec'd 2 u pRBCs overnight, 1 unit this AM. HDS, off pressors. Accuchecks 130-150s    Follow-up For: Procedure(s) (LRB):  TRANSPLANT, LIVER (N/A)    Post-Operative Day: 4 Days Post-Op    Objective:     Vital Signs (Most Recent):  Temp: 97.7 °F (36.5 °C) (11/15/18 0715)  Pulse: 95 (11/15/18 0815)  Resp: (!) 24 (11/15/18 0815)  BP: 109/62 (11/15/18 0800)  SpO2: 98 % (11/15/18 0815) Vital Signs (24h Range):  Temp:  [97.5 °F (36.4 °C)-98.8 °F (37.1 °C)] 97.7 °F (36.5 °C)  Pulse:  [] 95  Resp:  [15-47] 24  SpO2:  [93 %-99 %] 98 %  BP: ()/(56-77) 109/62  Arterial Line BP: ()/(52-83) 135/69     Weight: 98.1 kg (216 lb 4.3 oz)  Body mass index is 31.03 kg/m².      Intake/Output Summary (Last 24 hours) at 11/15/2018 0846  Last data filed at 11/15/2018 0800  Gross per 24 hour   Intake 3891.87 ml   Output 5280 ml   Net -1388.13 ml       Physical Exam   Constitutional: He is oriented to person, place, and time. He appears well-developed.   jaundiced   HENT:   Head: Normocephalic and atraumatic.   Eyes: Scleral icterus is present.   Cardiovascular: Normal rate, regular rhythm and intact distal pulses.   Pulmonary/Chest: Effort normal. No respiratory distress.   Abdominal: Soft. He exhibits no distension.   Incision with dried blood, c/d/i  Dressing over former site of right drain; ostomy bag over former left sided drain sites,  collecting SS output   Musculoskeletal: He exhibits edema.   Neurological: He is alert and oriented to person, place, and time.   Skin: Skin is warm and dry.   Jaundice improving       Lines/Drains/Airways     Central Venous Catheter Line                 Percutaneous Central Line Insertion/Assessment - double lumen  right subclavian -- days         Tunneled Central Line Insertion/Assessment - Double Lumen  11/07/18 1350 right internal jugular 7 days          Drain                 Urethral Catheter 11/11/18 0246 Straight-tip;Non-latex 16 Fr. 4 days          Arterial Line                 Arterial Line 11/11/18 0159 Left Radial 4 days                Significant Labs:    CBC/Anemia Profile:  Recent Labs   Lab 11/14/18  1747 11/15/18  0018 11/15/18  0512   WBC 13.06* 12.19 10.58  10.58   HGB 7.9* 8.1* 7.9*  7.9*   HCT 23.7* 23.9* 23.4*  23.4*   PLT 49* 51* 44*  44*   MCV 91 89 91  91   RDW 14.4 14.6* 14.9*  14.9*        Chemistries:  Recent Labs   Lab 11/14/18  0550 11/14/18  1408 11/14/18  2210 11/15/18  0512   * 132* 136  136 137  137  137   K 4.8 5.2* 4.8  4.8 4.6  4.6  4.6   CL 99 99 103  103 103  103  103   CO2 23 22* 26  26 25  25  25   BUN 44* 51* 31*  31* 18  18  18   CREATININE 3.0* 3.3* 2.1*  2.1* 1.4  1.4  1.4   CALCIUM 8.0* 7.8* 8.0*  8.0* 8.0*  8.0*  8.0*   ALBUMIN 2.3* 2.3* 2.4*  2.4* 2.4*  2.4*  2.4*   PROT 4.0*  --   --  4.2*   BILITOT 10.2*  --   --  12.1*   ALKPHOS 137*  --   --  201*   *  --   --  258*   AST 86*  --   --  117*   MG 2.5 2.4 2.2  2.2 2.0  2.0   PHOS 4.6* 5.0* 3.4  3.4 2.6*  2.6*       Significant Imaging:  I have reviewed and interpreted all pertinent imaging results/findings within the past 24 hours.    Assessment/Plan:     * S/P liver transplant    S/P liver transplant     52 y/o man with ESLD, ESRD and DM2 now s/p liver transplant     Neuro:   - Sedation: none  - Pain control: PRN fentanyl     Pulmonary:   - RA; O2 as required  - daily  cxr  - duonebs prn     Cardiac:  - Drips: none  - HDS     Renal:   -CRRT overnight; reevaluate needs today  -trend BMP  - BUN/Cr: 13/1.5 -> 35/2.7-> 31/2.1  - Nephrology on board, appreciate recommendations     Infectious Disease:   - AF  - Trend WBC - 12 -> 13->12  - Abx: Cipro (UCx 11/11 grew Enterococcus)  - Prophylaxis per transplant - Valcyte, Diflucan  - IS per Transplant - MMF, Prograf     Hematology/Oncology:  - H&H 7.4/20.8 -> 8.0/23 after 2U pRBCs -> 6.0 today; transfuse 2U pRBCs; h/h 8.1/23.9 s/p 2 units pRBC  - Plt 84 -> 59 -> 50 -> 63 (after 1 U Plt) -> 52->51  - INR 1.5  - Trend CBC     Endocrine:  - BG increased 170s -> 300s on insulin gtt  - SSI as gtt weaned  - Endocrinology on board, appreciate recommendations     Fluids/Electrolytes/Nutrition/GI:   - renal diet  - Trend CMP  - Replace Lytes PRN  - liver US 11/12 - 6.6 cm complex fluid collection deep to left lobe; increased flow velocity in main hepatic artery      Prophylaxis:  - SQH  - Famotidine     Dispo:  Continue ICU care - possibly stepdown pending Nephrology recs for CRRT requirements  Primary team:Transplant               Critical care was time spent personally by me on the following activities: development of treatment plan with patient or surrogate and bedside caregivers, discussions with consultants, evaluation of patient's response to treatment, examination of patient, ordering and performing treatments and interventions, ordering and review of laboratory studies, ordering and review of radiographic studies, pulse oximetry, re-evaluation of patient's condition.  This critical care time did not overlap with that of any other provider or involve time for any procedures.     Adolph Pavon MD  Critical Care - Surgery  [facility]

## 2018-11-15 NOTE — CARE UPDATE
Pt was more cooperative during the night; intermittent confusion. Did allow/participate in care. CRRT nocturnal @ 350 UF; no UOP from [facility]. Unable to obtain sputum or urine culture. Remained restrained throughout the night. NPO pending washout. Ativan given x1 for agitation. Insulin gtt titrated; no vaspressors. VSS and on RA; afebrile. Txplt resident overnight came to bedside to view output from right JOSE A site-bloody, sanguineous. ~800cc total for 12 hr.  POC reviewed with patient and mother at bedside.

## 2018-11-15 NOTE — ASSESSMENT & PLAN NOTE
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   - s/p OHLTx 11/11/2018   - remains anuric   - Had intra-op SLED  - CVP 1 now 5    Plan:  - Off RRT overnight   - MS today was suggestion of encephalopathy and possible Uremia  - Will do SELD for metabolic clearance and volume management overnight and reaccess in am   - -350 ml/hr as tolerated  - Remains anuric  - Strict I/O and chart  - Avoid nephrotoxic medications  - please keep Hb > 7 gm/dL or higher if symptomatic  - Medication doses adjusted to GFR

## 2018-11-16 ENCOUNTER — ANESTHESIA (OUTPATIENT)
Dept: SURGERY | Facility: HOSPITAL | Age: 53
DRG: 005 | End: 2018-11-16
Payer: COMMERCIAL

## 2018-11-16 ENCOUNTER — ANESTHESIA EVENT (OUTPATIENT)
Dept: SURGERY | Facility: HOSPITAL | Age: 53
DRG: 005 | End: 2018-11-16
Payer: COMMERCIAL

## 2018-11-16 LAB
ALBUMIN SERPL BCP-MCNC: 1.9 G/DL
ALBUMIN SERPL BCP-MCNC: 2 G/DL
ALBUMIN SERPL BCP-MCNC: 2.2 G/DL
ALBUMIN SERPL BCP-MCNC: 2.2 G/DL
ALBUMIN SERPL BCP-MCNC: 2.3 G/DL
ALBUMIN SERPL BCP-MCNC: 2.4 G/DL
ALLENS TEST: ABNORMAL
ALP SERPL-CCNC: 120 U/L
ALP SERPL-CCNC: 149 U/L
ALP SERPL-CCNC: 213 U/L
ALP SERPL-CCNC: 321 U/L
ALT SERPL W/O P-5'-P-CCNC: 136 U/L
ALT SERPL W/O P-5'-P-CCNC: 165 U/L
ALT SERPL W/O P-5'-P-CCNC: 201 U/L
ALT SERPL W/O P-5'-P-CCNC: 248 U/L
ANION GAP SERPL CALC-SCNC: 5 MMOL/L
ANION GAP SERPL CALC-SCNC: 6 MMOL/L
ANION GAP SERPL CALC-SCNC: 7 MMOL/L
ANION GAP SERPL CALC-SCNC: 8 MMOL/L
ANION GAP SERPL CALC-SCNC: 9 MMOL/L
ANISOCYTOSIS BLD QL SMEAR: SLIGHT
APTT BLDCRRT: 26 SEC
AST SERPL-CCNC: 104 U/L
AST SERPL-CCNC: 109 U/L
AST SERPL-CCNC: 110 U/L
AST SERPL-CCNC: 119 U/L
BASO STIPL BLD QL SMEAR: ABNORMAL
BASOPHILS # BLD AUTO: 0.01 K/UL
BASOPHILS # BLD AUTO: 0.02 K/UL
BASOPHILS # BLD AUTO: 0.06 K/UL
BASOPHILS # BLD AUTO: 0.09 K/UL
BASOPHILS # BLD AUTO: 0.09 K/UL
BASOPHILS # BLD AUTO: 0.13 K/UL
BASOPHILS NFR BLD: 0.1 %
BASOPHILS NFR BLD: 0.1 %
BASOPHILS NFR BLD: 0.2 %
BASOPHILS NFR BLD: 0.3 %
BASOPHILS NFR BLD: 0.4 %
BASOPHILS NFR BLD: 0.5 %
BILIRUB DIRECT SERPL-MCNC: 10.5 MG/DL
BILIRUB SERPL-MCNC: 11.7 MG/DL
BILIRUB SERPL-MCNC: 13.1 MG/DL
BILIRUB SERPL-MCNC: 14.6 MG/DL
BILIRUB SERPL-MCNC: 9.3 MG/DL
BLD PROD TYP BPU: NORMAL
BLOOD UNIT EXPIRATION DATE: NORMAL
BLOOD UNIT TYPE CODE: 5100
BLOOD UNIT TYPE CODE: 7300
BLOOD UNIT TYPE: NORMAL
BUN SERPL-MCNC: 10 MG/DL
BUN SERPL-MCNC: 11 MG/DL
BUN SERPL-MCNC: 12 MG/DL
BUN SERPL-MCNC: 13 MG/DL
BUN SERPL-MCNC: 15 MG/DL
BUN SERPL-MCNC: 7 MG/DL
BUN SERPL-MCNC: 7 MG/DL
CALCIUM SERPL-MCNC: 7.6 MG/DL
CALCIUM SERPL-MCNC: 7.7 MG/DL
CALCIUM SERPL-MCNC: 7.8 MG/DL
CALCIUM SERPL-MCNC: 7.8 MG/DL
CALCIUM SERPL-MCNC: 8.3 MG/DL
CALCIUM SERPL-MCNC: 8.3 MG/DL
CHLORIDE SERPL-SCNC: 105 MMOL/L
CHLORIDE SERPL-SCNC: 106 MMOL/L
CHLORIDE SERPL-SCNC: 108 MMOL/L
CHLORIDE SERPL-SCNC: 110 MMOL/L
CO2 SERPL-SCNC: 19 MMOL/L
CO2 SERPL-SCNC: 21 MMOL/L
CO2 SERPL-SCNC: 24 MMOL/L
CO2 SERPL-SCNC: 25 MMOL/L
CO2 SERPL-SCNC: 26 MMOL/L
CO2 SERPL-SCNC: 26 MMOL/L
CODING SYSTEM: NORMAL
CREAT SERPL-MCNC: 0.7 MG/DL
CREAT SERPL-MCNC: 0.8 MG/DL
CREAT SERPL-MCNC: 0.9 MG/DL
CREAT SERPL-MCNC: 0.9 MG/DL
DELSYS: ABNORMAL
DIFFERENTIAL METHOD: ABNORMAL
DISPENSE STATUS: NORMAL
EOSINOPHIL # BLD AUTO: 0.2 K/UL
EOSINOPHIL # BLD AUTO: 0.3 K/UL
EOSINOPHIL # BLD AUTO: 0.4 K/UL
EOSINOPHIL # BLD AUTO: 0.5 K/UL
EOSINOPHIL # BLD AUTO: 0.6 K/UL
EOSINOPHIL # BLD AUTO: 0.8 K/UL
EOSINOPHIL NFR BLD: 1.2 %
EOSINOPHIL NFR BLD: 1.8 %
EOSINOPHIL NFR BLD: 2 %
EOSINOPHIL NFR BLD: 2.2 %
EOSINOPHIL NFR BLD: 2.4 %
EOSINOPHIL NFR BLD: 2.7 %
ERYTHROCYTE [DISTWIDTH] IN BLOOD BY AUTOMATED COUNT: 14.4 %
ERYTHROCYTE [DISTWIDTH] IN BLOOD BY AUTOMATED COUNT: 14.4 %
ERYTHROCYTE [DISTWIDTH] IN BLOOD BY AUTOMATED COUNT: 14.6 %
ERYTHROCYTE [DISTWIDTH] IN BLOOD BY AUTOMATED COUNT: 15.3 %
ERYTHROCYTE [DISTWIDTH] IN BLOOD BY AUTOMATED COUNT: 15.4 %
ERYTHROCYTE [DISTWIDTH] IN BLOOD BY AUTOMATED COUNT: 15.7 %
ERYTHROCYTE [SEDIMENTATION RATE] IN BLOOD BY WESTERGREN METHOD: 16 MM/H
ERYTHROCYTE [SEDIMENTATION RATE] IN BLOOD BY WESTERGREN METHOD: 16 MM/H
EST. GFR  (AFRICAN AMERICAN): >60 ML/MIN/1.73 M^2
EST. GFR  (NON AFRICAN AMERICAN): >60 ML/MIN/1.73 M^2
FIO2: 100
FIO2: 60
GGT SERPL-CCNC: 1023 U/L
GLUCOSE SERPL-MCNC: 103 MG/DL (ref 70–110)
GLUCOSE SERPL-MCNC: 114 MG/DL
GLUCOSE SERPL-MCNC: 114 MG/DL
GLUCOSE SERPL-MCNC: 127 MG/DL (ref 70–110)
GLUCOSE SERPL-MCNC: 159 MG/DL
GLUCOSE SERPL-MCNC: 186 MG/DL
GLUCOSE SERPL-MCNC: 188 MG/DL (ref 70–110)
GLUCOSE SERPL-MCNC: 188 MG/DL (ref 70–110)
GLUCOSE SERPL-MCNC: 189 MG/DL
GLUCOSE SERPL-MCNC: 192 MG/DL
GLUCOSE SERPL-MCNC: 199 MG/DL (ref 70–110)
GLUCOSE SERPL-MCNC: 221 MG/DL
HCO3 UR-SCNC: 21.3 MMOL/L (ref 24–28)
HCO3 UR-SCNC: 22.1 MMOL/L (ref 24–28)
HCO3 UR-SCNC: 22.4 MMOL/L (ref 24–28)
HCO3 UR-SCNC: 22.6 MMOL/L (ref 24–28)
HCO3 UR-SCNC: 23.2 MMOL/L (ref 24–28)
HCO3 UR-SCNC: 23.4 MMOL/L (ref 24–28)
HCO3 UR-SCNC: 26 MMOL/L (ref 24–28)
HCO3 UR-SCNC: 27.3 MMOL/L (ref 24–28)
HCT VFR BLD AUTO: 20 %
HCT VFR BLD AUTO: 24.1 %
HCT VFR BLD AUTO: 26.7 %
HCT VFR BLD AUTO: 28.4 %
HCT VFR BLD AUTO: 34.1 %
HCT VFR BLD AUTO: 35.4 %
HCT VFR BLD CALC: 16 %PCV (ref 36–54)
HCT VFR BLD CALC: 21 %PCV (ref 36–54)
HCT VFR BLD CALC: 22 %PCV (ref 36–54)
HCT VFR BLD CALC: 23 %PCV (ref 36–54)
HCT VFR BLD CALC: 24 %PCV (ref 36–54)
HCT VFR BLD CALC: 26 %PCV (ref 36–54)
HCT VFR BLD CALC: 33 %PCV (ref 36–54)
HGB BLD-MCNC: 11.4 G/DL
HGB BLD-MCNC: 11.4 G/DL
HGB BLD-MCNC: 6.6 G/DL
HGB BLD-MCNC: 8.1 G/DL
HGB BLD-MCNC: 8.7 G/DL
HGB BLD-MCNC: 9.4 G/DL
HYPOCHROMIA BLD QL SMEAR: ABNORMAL
IMM GRANULOCYTES # BLD AUTO: 0.15 K/UL
IMM GRANULOCYTES # BLD AUTO: 0.46 K/UL
IMM GRANULOCYTES # BLD AUTO: 0.63 K/UL
IMM GRANULOCYTES # BLD AUTO: 0.77 K/UL
IMM GRANULOCYTES # BLD AUTO: 0.82 K/UL
IMM GRANULOCYTES # BLD AUTO: 0.84 K/UL
IMM GRANULOCYTES NFR BLD AUTO: 1.6 %
IMM GRANULOCYTES NFR BLD AUTO: 2.1 %
IMM GRANULOCYTES NFR BLD AUTO: 2.6 %
IMM GRANULOCYTES NFR BLD AUTO: 3 %
IMM GRANULOCYTES NFR BLD AUTO: 3.3 %
IMM GRANULOCYTES NFR BLD AUTO: 3.4 %
INR PPP: 1.1
INR PPP: 1.3
INR PPP: 1.3
INR PPP: 1.4
LDH SERPL L TO P-CCNC: 1.54 MMOL/L (ref 0.36–1.25)
LDH SERPL L TO P-CCNC: 1.67 MMOL/L (ref 0.36–1.25)
LDH SERPL L TO P-CCNC: 2.03 MMOL/L (ref 0.36–1.25)
LYMPHOCYTES # BLD AUTO: 0.9 K/UL
LYMPHOCYTES # BLD AUTO: 1.1 K/UL
LYMPHOCYTES # BLD AUTO: 1.1 K/UL
LYMPHOCYTES # BLD AUTO: 1.3 K/UL
LYMPHOCYTES # BLD AUTO: 1.5 K/UL
LYMPHOCYTES # BLD AUTO: 1.9 K/UL
LYMPHOCYTES NFR BLD: 4.6 %
LYMPHOCYTES NFR BLD: 5.3 %
LYMPHOCYTES NFR BLD: 5.5 %
LYMPHOCYTES NFR BLD: 6.1 %
LYMPHOCYTES NFR BLD: 6.6 %
LYMPHOCYTES NFR BLD: 9.6 %
MAGNESIUM SERPL-MCNC: 1.7 MG/DL
MAGNESIUM SERPL-MCNC: 1.8 MG/DL
MAGNESIUM SERPL-MCNC: 1.9 MG/DL
MAGNESIUM SERPL-MCNC: 2 MG/DL
MAGNESIUM SERPL-MCNC: 2 MG/DL
MCH RBC QN AUTO: 29.6 PG
MCH RBC QN AUTO: 29.9 PG
MCH RBC QN AUTO: 30.1 PG
MCH RBC QN AUTO: 30.1 PG
MCH RBC QN AUTO: 30.6 PG
MCH RBC QN AUTO: 30.8 PG
MCHC RBC AUTO-ENTMCNC: 32.2 G/DL
MCHC RBC AUTO-ENTMCNC: 32.6 G/DL
MCHC RBC AUTO-ENTMCNC: 33 G/DL
MCHC RBC AUTO-ENTMCNC: 33.1 G/DL
MCHC RBC AUTO-ENTMCNC: 33.4 G/DL
MCHC RBC AUTO-ENTMCNC: 33.6 G/DL
MCV RBC AUTO: 90 FL
MCV RBC AUTO: 91 FL
MCV RBC AUTO: 92 FL
MCV RBC AUTO: 93 FL
MODE: ABNORMAL
MODE: ABNORMAL
MONOCYTES # BLD AUTO: 0.8 K/UL
MONOCYTES # BLD AUTO: 1.1 K/UL
MONOCYTES # BLD AUTO: 1.4 K/UL
MONOCYTES # BLD AUTO: 1.4 K/UL
MONOCYTES # BLD AUTO: 1.6 K/UL
MONOCYTES # BLD AUTO: 1.8 K/UL
MONOCYTES NFR BLD: 4.9 %
MONOCYTES NFR BLD: 5.5 %
MONOCYTES NFR BLD: 6.3 %
MONOCYTES NFR BLD: 6.5 %
MONOCYTES NFR BLD: 6.7 %
MONOCYTES NFR BLD: 7.9 %
NEUTROPHILS # BLD AUTO: 18.2 K/UL
NEUTROPHILS # BLD AUTO: 19.1 K/UL
NEUTROPHILS # BLD AUTO: 20.4 K/UL
NEUTROPHILS # BLD AUTO: 20.5 K/UL
NEUTROPHILS # BLD AUTO: 22.8 K/UL
NEUTROPHILS # BLD AUTO: 7.6 K/UL
NEUTROPHILS NFR BLD: 78.6 %
NEUTROPHILS NFR BLD: 81.1 %
NEUTROPHILS NFR BLD: 82.2 %
NEUTROPHILS NFR BLD: 83.8 %
NEUTROPHILS NFR BLD: 84.1 %
NEUTROPHILS NFR BLD: 84.8 %
NRBC BLD-RTO: 0 /100 WBC
OVALOCYTES BLD QL SMEAR: ABNORMAL
PCO2 BLDA: 31.9 MMHG (ref 35–45)
PCO2 BLDA: 32.4 MMHG (ref 35–45)
PCO2 BLDA: 33.3 MMHG (ref 35–45)
PCO2 BLDA: 34.5 MMHG (ref 35–45)
PCO2 BLDA: 35.4 MMHG (ref 35–45)
PCO2 BLDA: 38.2 MMHG (ref 35–45)
PCO2 BLDA: 44.5 MMHG (ref 35–45)
PCO2 BLDA: 48.2 MMHG (ref 35–45)
PEEP: 5
PH SMN: 7.29 [PH] (ref 7.35–7.45)
PH SMN: 7.37 [PH] (ref 7.35–7.45)
PH SMN: 7.41 [PH] (ref 7.35–7.45)
PH SMN: 7.42 [PH] (ref 7.35–7.45)
PH SMN: 7.43 [PH] (ref 7.35–7.45)
PH SMN: 7.43 [PH] (ref 7.35–7.45)
PH SMN: 7.46 [PH] (ref 7.35–7.45)
PH SMN: 7.47 [PH] (ref 7.35–7.45)
PHOSPHATE SERPL-MCNC: 1.4 MG/DL
PHOSPHATE SERPL-MCNC: 2.6 MG/DL
PHOSPHATE SERPL-MCNC: 2.8 MG/DL
PHOSPHATE SERPL-MCNC: 3.1 MG/DL
PLATELET # BLD AUTO: 100 K/UL
PLATELET # BLD AUTO: 123 K/UL
PLATELET # BLD AUTO: 145 K/UL
PLATELET # BLD AUTO: 42 K/UL
PLATELET # BLD AUTO: 83 K/UL
PLATELET # BLD AUTO: 86 K/UL
PLATELET BLD QL SMEAR: ABNORMAL
PMV BLD AUTO: 11.1 FL
PMV BLD AUTO: 11.2 FL
PMV BLD AUTO: 11.3 FL
PMV BLD AUTO: 11.3 FL
PMV BLD AUTO: 11.7 FL
PMV BLD AUTO: 12 FL
PO2 BLDA: 113 MMHG (ref 80–100)
PO2 BLDA: 115 MMHG (ref 80–100)
PO2 BLDA: 154 MMHG (ref 80–100)
PO2 BLDA: 184 MMHG (ref 80–100)
PO2 BLDA: 195 MMHG (ref 80–100)
PO2 BLDA: 232 MMHG (ref 80–100)
PO2 BLDA: 277 MMHG (ref 80–100)
PO2 BLDA: 53 MMHG (ref 80–100)
POC BE: -2 MMOL/L
POC BE: -3 MMOL/L
POC BE: -3 MMOL/L
POC BE: 0 MMOL/L
POC BE: 1 MMOL/L
POC BE: 4 MMOL/L
POC IONIZED CALCIUM: 0.67 MMOL/L (ref 1.06–1.42)
POC IONIZED CALCIUM: 1.04 MMOL/L (ref 1.06–1.42)
POC IONIZED CALCIUM: 1.08 MMOL/L (ref 1.06–1.42)
POC IONIZED CALCIUM: 1.08 MMOL/L (ref 1.06–1.42)
POC IONIZED CALCIUM: 1.1 MMOL/L (ref 1.06–1.42)
POC IONIZED CALCIUM: 1.1 MMOL/L (ref 1.06–1.42)
POC IONIZED CALCIUM: 1.27 MMOL/L (ref 1.06–1.42)
POC SATURATED O2: 100 % (ref 95–100)
POC SATURATED O2: 89 % (ref 95–100)
POC SATURATED O2: 98 % (ref 95–100)
POC SATURATED O2: 99 % (ref 95–100)
POC SATURATED O2: 99 % (ref 95–100)
POC TCO2: 22 MMOL/L (ref 23–27)
POC TCO2: 23 MMOL/L (ref 23–27)
POC TCO2: 23 MMOL/L (ref 23–27)
POC TCO2: 24 MMOL/L (ref 23–27)
POC TCO2: 24 MMOL/L (ref 23–27)
POC TCO2: 25 MMOL/L (ref 23–27)
POC TCO2: 27 MMOL/L (ref 23–27)
POC TCO2: 28 MMOL/L (ref 23–27)
POCT GLUCOSE: 101 MG/DL (ref 70–110)
POCT GLUCOSE: 103 MG/DL (ref 70–110)
POCT GLUCOSE: 117 MG/DL (ref 70–110)
POCT GLUCOSE: 191 MG/DL (ref 70–110)
POCT GLUCOSE: 238 MG/DL (ref 70–110)
POIKILOCYTOSIS BLD QL SMEAR: SLIGHT
POLYCHROMASIA BLD QL SMEAR: ABNORMAL
POTASSIUM BLD-SCNC: 4.3 MMOL/L (ref 3.5–5.1)
POTASSIUM BLD-SCNC: 4.5 MMOL/L (ref 3.5–5.1)
POTASSIUM BLD-SCNC: 4.7 MMOL/L (ref 3.5–5.1)
POTASSIUM BLD-SCNC: 4.8 MMOL/L (ref 3.5–5.1)
POTASSIUM BLD-SCNC: 5 MMOL/L (ref 3.5–5.1)
POTASSIUM BLD-SCNC: 5.1 MMOL/L (ref 3.5–5.1)
POTASSIUM BLD-SCNC: 5.2 MMOL/L (ref 3.5–5.1)
POTASSIUM SERPL-SCNC: 4.5 MMOL/L
POTASSIUM SERPL-SCNC: 4.5 MMOL/L
POTASSIUM SERPL-SCNC: 4.8 MMOL/L
POTASSIUM SERPL-SCNC: 4.9 MMOL/L
POTASSIUM SERPL-SCNC: 5 MMOL/L
POTASSIUM SERPL-SCNC: 5.1 MMOL/L
POTASSIUM SERPL-SCNC: 5.3 MMOL/L
PROT SERPL-MCNC: 3.1 G/DL
PROT SERPL-MCNC: 3.7 G/DL
PROT SERPL-MCNC: 3.9 G/DL
PROT SERPL-MCNC: 4.2 G/DL
PROTHROMBIN TIME: 11.4 SEC
PROTHROMBIN TIME: 13 SEC
PROTHROMBIN TIME: 13 SEC
PROTHROMBIN TIME: 13.9 SEC
RBC # BLD AUTO: 2.16 M/UL
RBC # BLD AUTO: 2.63 M/UL
RBC # BLD AUTO: 2.89 M/UL
RBC # BLD AUTO: 3.12 M/UL
RBC # BLD AUTO: 3.81 M/UL
RBC # BLD AUTO: 3.85 M/UL
SAMPLE: ABNORMAL
SITE: ABNORMAL
SODIUM BLD-SCNC: 136 MMOL/L (ref 136–145)
SODIUM BLD-SCNC: 138 MMOL/L (ref 136–145)
SODIUM BLD-SCNC: 139 MMOL/L (ref 136–145)
SODIUM BLD-SCNC: 139 MMOL/L (ref 136–145)
SODIUM BLD-SCNC: 140 MMOL/L (ref 136–145)
SODIUM SERPL-SCNC: 135 MMOL/L
SODIUM SERPL-SCNC: 136 MMOL/L
SODIUM SERPL-SCNC: 138 MMOL/L
TACROLIMUS BLD-MCNC: 4.8 NG/ML
TRANS ERYTHROCYTES VOL PATIENT: NORMAL ML
TRANS PLATPHERESIS VOL PATIENT: NORMAL ML
VT: 320
VT: 320
WBC # BLD AUTO: 21.45 K/UL
WBC # BLD AUTO: 22.74 K/UL
WBC # BLD AUTO: 24.35 K/UL
WBC # BLD AUTO: 24.77 K/UL
WBC # BLD AUTO: 28.15 K/UL
WBC # BLD AUTO: 9.64 K/UL

## 2018-11-16 PROCEDURE — 63600175 PHARM REV CODE 636 W HCPCS: Performed by: STUDENT IN AN ORGANIZED HEALTH CARE EDUCATION/TRAINING PROGRAM

## 2018-11-16 PROCEDURE — 37000009 HC ANESTHESIA EA ADD 15 MINS: Performed by: TRANSPLANT SURGERY

## 2018-11-16 PROCEDURE — 63600175 PHARM REV CODE 636 W HCPCS: Performed by: NURSE ANESTHETIST, CERTIFIED REGISTERED

## 2018-11-16 PROCEDURE — 25000003 PHARM REV CODE 250: Performed by: NURSE ANESTHETIST, CERTIFIED REGISTERED

## 2018-11-16 PROCEDURE — 25000003 PHARM REV CODE 250: Performed by: HOSPITALIST

## 2018-11-16 PROCEDURE — 35840 EXPLORE ABDOMINAL VESSELS: CPT | Mod: 78,,, | Performed by: TRANSPLANT SURGERY

## 2018-11-16 PROCEDURE — 88305 TISSUE SPECIMEN TO PATHOLOGY - SURGERY: ICD-10-PCS | Mod: 26,,, | Performed by: PATHOLOGY

## 2018-11-16 PROCEDURE — 94761 N-INVAS EAR/PLS OXIMETRY MLT: CPT

## 2018-11-16 PROCEDURE — 99232 SBSQ HOSP IP/OBS MODERATE 35: CPT | Mod: ,,, | Performed by: NURSE PRACTITIONER

## 2018-11-16 PROCEDURE — 25000003 PHARM REV CODE 250: Performed by: PEDIATRICS

## 2018-11-16 PROCEDURE — 27200966 HC CLOSED SUCTION SYSTEM

## 2018-11-16 PROCEDURE — 25000003 PHARM REV CODE 250: Performed by: TRANSPLANT SURGERY

## 2018-11-16 PROCEDURE — 93010 EKG 12-LEAD: ICD-10-PCS | Mod: 76,,, | Performed by: INTERNAL MEDICINE

## 2018-11-16 PROCEDURE — P9017 PLASMA 1 DONOR FRZ W/IN 8 HR: HCPCS

## 2018-11-16 PROCEDURE — 83735 ASSAY OF MAGNESIUM: CPT | Mod: 91

## 2018-11-16 PROCEDURE — 63600175 PHARM REV CODE 636 W HCPCS: Mod: JG | Performed by: STUDENT IN AN ORGANIZED HEALTH CARE EDUCATION/TRAINING PROGRAM

## 2018-11-16 PROCEDURE — 88307 TISSUE EXAM BY PATHOLOGIST: CPT | Performed by: PATHOLOGY

## 2018-11-16 PROCEDURE — 80053 COMPREHEN METABOLIC PANEL: CPT | Mod: 91

## 2018-11-16 PROCEDURE — 85610 PROTHROMBIN TIME: CPT | Mod: 91

## 2018-11-16 PROCEDURE — 37000008 HC ANESTHESIA 1ST 15 MINUTES: Performed by: TRANSPLANT SURGERY

## 2018-11-16 PROCEDURE — 85025 COMPLETE CBC W/AUTO DIFF WBC: CPT | Mod: 91

## 2018-11-16 PROCEDURE — 63600175 PHARM REV CODE 636 W HCPCS: Mod: JG

## 2018-11-16 PROCEDURE — 35840 PR EXPLOR POSTOP BLEED,INFEC,CLOT-ABD: ICD-10-PCS | Mod: 78,,, | Performed by: TRANSPLANT SURGERY

## 2018-11-16 PROCEDURE — P9045 ALBUMIN (HUMAN), 5%, 250 ML: HCPCS | Mod: JG

## 2018-11-16 PROCEDURE — P9035 PLATELET PHERES LEUKOREDUCED: HCPCS

## 2018-11-16 PROCEDURE — 80100008 HC CRRT DAILY MAINTENANCE

## 2018-11-16 PROCEDURE — 99900026 HC AIRWAY MAINTENANCE (STAT)

## 2018-11-16 PROCEDURE — 27201423 OPTIME MED/SURG SUP & DEVICES STERILE SUPPLY: Performed by: TRANSPLANT SURGERY

## 2018-11-16 PROCEDURE — 82977 ASSAY OF GGT: CPT

## 2018-11-16 PROCEDURE — D9220A PRA ANESTHESIA: Mod: 59,CRNA,, | Performed by: NURSE ANESTHETIST, CERTIFIED REGISTERED

## 2018-11-16 PROCEDURE — 88313 TISSUE SPECIMEN TO PATHOLOGY - SURGERY: ICD-10-PCS | Mod: 26,,, | Performed by: PATHOLOGY

## 2018-11-16 PROCEDURE — D9220A PRA ANESTHESIA: Mod: ANES,,, | Performed by: ANESTHESIOLOGY

## 2018-11-16 PROCEDURE — 60540 EXPLORE ADRENAL GLAND: CPT | Mod: 78,RT,, | Performed by: TRANSPLANT SURGERY

## 2018-11-16 PROCEDURE — 99233 SBSQ HOSP IP/OBS HIGH 50: CPT | Mod: 24,,, | Performed by: SURGERY

## 2018-11-16 PROCEDURE — 99232 PR SUBSEQUENT HOSPITAL CARE,LEVL II: ICD-10-PCS | Mod: ,,, | Performed by: INTERNAL MEDICINE

## 2018-11-16 PROCEDURE — P9021 RED BLOOD CELLS UNIT: HCPCS

## 2018-11-16 PROCEDURE — 88305 TISSUE EXAM BY PATHOLOGIST: CPT | Mod: 26,,, | Performed by: PATHOLOGY

## 2018-11-16 PROCEDURE — 60540 PR EXCISE ADRENAL GLAND: ICD-10-PCS | Mod: 78,RT,, | Performed by: TRANSPLANT SURGERY

## 2018-11-16 PROCEDURE — 20000000 HC ICU ROOM

## 2018-11-16 PROCEDURE — 99233 PR SUBSEQUENT HOSPITAL CARE,LEVL III: ICD-10-PCS | Mod: 24,,, | Performed by: SURGERY

## 2018-11-16 PROCEDURE — 36000707: Performed by: TRANSPLANT SURGERY

## 2018-11-16 PROCEDURE — 27000221 HC OXYGEN, UP TO 24 HOURS

## 2018-11-16 PROCEDURE — 80197 ASSAY OF TACROLIMUS: CPT

## 2018-11-16 PROCEDURE — 80069 RENAL FUNCTION PANEL: CPT

## 2018-11-16 PROCEDURE — D9220A PRA ANESTHESIA: Mod: 59,ANES,, | Performed by: ANESTHESIOLOGY

## 2018-11-16 PROCEDURE — 88307 TISSUE SPECIMEN TO PATHOLOGY - SURGERY: ICD-10-PCS | Mod: 26,,, | Performed by: PATHOLOGY

## 2018-11-16 PROCEDURE — 63600175 PHARM REV CODE 636 W HCPCS: Performed by: HOSPITALIST

## 2018-11-16 PROCEDURE — 93010 ELECTROCARDIOGRAM REPORT: CPT | Mod: ,,, | Performed by: INTERNAL MEDICINE

## 2018-11-16 PROCEDURE — 99232 PR SUBSEQUENT HOSPITAL CARE,LEVL II: ICD-10-PCS | Mod: ,,, | Performed by: NURSE PRACTITIONER

## 2018-11-16 PROCEDURE — 99232 SBSQ HOSP IP/OBS MODERATE 35: CPT | Mod: ,,, | Performed by: INTERNAL MEDICINE

## 2018-11-16 PROCEDURE — 25000003 PHARM REV CODE 250: Performed by: STUDENT IN AN ORGANIZED HEALTH CARE EDUCATION/TRAINING PROGRAM

## 2018-11-16 PROCEDURE — 99900035 HC TECH TIME PER 15 MIN (STAT)

## 2018-11-16 PROCEDURE — 82248 BILIRUBIN DIRECT: CPT

## 2018-11-16 PROCEDURE — 90945 DIALYSIS ONE EVALUATION: CPT

## 2018-11-16 PROCEDURE — 93005 ELECTROCARDIOGRAM TRACING: CPT

## 2018-11-16 PROCEDURE — 88313 SPECIAL STAINS GROUP 2: CPT | Mod: 26,,, | Performed by: PATHOLOGY

## 2018-11-16 PROCEDURE — 80053 COMPREHEN METABOLIC PANEL: CPT

## 2018-11-16 PROCEDURE — 93010 ELECTROCARDIOGRAM REPORT: CPT | Mod: 76,,, | Performed by: INTERNAL MEDICINE

## 2018-11-16 PROCEDURE — 94002 VENT MGMT INPAT INIT DAY: CPT

## 2018-11-16 PROCEDURE — 84100 ASSAY OF PHOSPHORUS: CPT

## 2018-11-16 PROCEDURE — P9045 ALBUMIN (HUMAN), 5%, 250 ML: HCPCS | Mod: JG | Performed by: STUDENT IN AN ORGANIZED HEALTH CARE EDUCATION/TRAINING PROGRAM

## 2018-11-16 PROCEDURE — 36000706: Performed by: TRANSPLANT SURGERY

## 2018-11-16 PROCEDURE — S0028 INJECTION, FAMOTIDINE, 20 MG: HCPCS | Performed by: TRANSPLANT SURGERY

## 2018-11-16 PROCEDURE — 88307 TISSUE EXAM BY PATHOLOGIST: CPT | Mod: 26,,, | Performed by: PATHOLOGY

## 2018-11-16 PROCEDURE — 88305 TISSUE EXAM BY PATHOLOGIST: CPT | Performed by: PATHOLOGY

## 2018-11-16 PROCEDURE — 85730 THROMBOPLASTIN TIME PARTIAL: CPT

## 2018-11-16 PROCEDURE — D9220A PRA ANESTHESIA: Mod: CRNA,,, | Performed by: NURSE ANESTHETIST, CERTIFIED REGISTERED

## 2018-11-16 PROCEDURE — 25000003 PHARM REV CODE 250

## 2018-11-16 PROCEDURE — 63600175 PHARM REV CODE 636 W HCPCS: Performed by: TRANSPLANT SURGERY

## 2018-11-16 PROCEDURE — 85610 PROTHROMBIN TIME: CPT

## 2018-11-16 RX ORDER — HYDROCODONE BITARTRATE AND ACETAMINOPHEN 500; 5 MG/1; MG/1
TABLET ORAL
Status: DISCONTINUED | OUTPATIENT
Start: 2018-11-16 | End: 2018-11-16 | Stop reason: SDUPTHER

## 2018-11-16 RX ORDER — FENTANYL CITRATE 50 UG/ML
INJECTION, SOLUTION INTRAMUSCULAR; INTRAVENOUS
Status: DISCONTINUED | OUTPATIENT
Start: 2018-11-16 | End: 2018-11-16

## 2018-11-16 RX ORDER — NOREPINEPHRINE BITARTRATE/D5W 4MG/250ML
PLASTIC BAG, INJECTION (ML) INTRAVENOUS
Status: COMPLETED
Start: 2018-11-16 | End: 2018-11-16

## 2018-11-16 RX ORDER — PROPOFOL 10 MG/ML
VIAL (ML) INTRAVENOUS
Status: DISCONTINUED | OUTPATIENT
Start: 2018-11-16 | End: 2018-11-16

## 2018-11-16 RX ORDER — PHENYLEPHRINE HCL IN 0.9% NACL 1 MG/10 ML
SYRINGE (ML) INTRAVENOUS
Status: DISPENSED
Start: 2018-11-16 | End: 2018-11-17

## 2018-11-16 RX ORDER — ROCURONIUM BROMIDE 10 MG/ML
INJECTION, SOLUTION INTRAVENOUS
Status: DISCONTINUED | OUTPATIENT
Start: 2018-11-16 | End: 2018-11-16

## 2018-11-16 RX ORDER — HYDROCODONE BITARTRATE AND ACETAMINOPHEN 500; 5 MG/1; MG/1
TABLET ORAL CONTINUOUS
Status: DISCONTINUED | OUTPATIENT
Start: 2018-11-16 | End: 2018-11-17

## 2018-11-16 RX ORDER — ALBUMIN HUMAN 50 G/1000ML
25 SOLUTION INTRAVENOUS ONCE
Status: COMPLETED | OUTPATIENT
Start: 2018-11-16 | End: 2018-11-16

## 2018-11-16 RX ORDER — HYDROCODONE BITARTRATE AND ACETAMINOPHEN 5; 325 MG/1; MG/1
1 TABLET ORAL EVERY 4 HOURS PRN
Status: CANCELLED | OUTPATIENT
Start: 2018-11-16

## 2018-11-16 RX ORDER — INSULIN ASPART 100 [IU]/ML
0-5 INJECTION, SOLUTION INTRAVENOUS; SUBCUTANEOUS EVERY 6 HOURS PRN
Status: DISCONTINUED | OUTPATIENT
Start: 2018-11-16 | End: 2018-11-18

## 2018-11-16 RX ORDER — MAGNESIUM SULFATE HEPTAHYDRATE 40 MG/ML
2 INJECTION, SOLUTION INTRAVENOUS
Status: DISCONTINUED | OUTPATIENT
Start: 2018-11-16 | End: 2018-11-17

## 2018-11-16 RX ORDER — SODIUM CHLORIDE 9 MG/ML
INJECTION, SOLUTION INTRAVENOUS CONTINUOUS PRN
Status: DISCONTINUED | OUTPATIENT
Start: 2018-11-16 | End: 2018-11-16

## 2018-11-16 RX ORDER — CIPROFLOXACIN 2 MG/ML
400 INJECTION, SOLUTION INTRAVENOUS
Status: DISCONTINUED | OUTPATIENT
Start: 2018-11-16 | End: 2018-11-17

## 2018-11-16 RX ORDER — HYDROCODONE BITARTRATE AND ACETAMINOPHEN 500; 5 MG/1; MG/1
TABLET ORAL
Status: DISCONTINUED | OUTPATIENT
Start: 2018-11-16 | End: 2018-11-17

## 2018-11-16 RX ORDER — PROPOFOL 10 MG/ML
5 INJECTION, EMULSION INTRAVENOUS CONTINUOUS
Status: DISCONTINUED | OUTPATIENT
Start: 2018-11-16 | End: 2018-11-19

## 2018-11-16 RX ORDER — KETAMINE HCL IN 0.9 % NACL 50 MG/5 ML
SYRINGE (ML) INTRAVENOUS
Status: DISCONTINUED | OUTPATIENT
Start: 2018-11-16 | End: 2018-11-16

## 2018-11-16 RX ORDER — MIDAZOLAM HYDROCHLORIDE 1 MG/ML
INJECTION, SOLUTION INTRAMUSCULAR; INTRAVENOUS
Status: DISCONTINUED | OUTPATIENT
Start: 2018-11-16 | End: 2018-11-16

## 2018-11-16 RX ORDER — NOREPINEPHRINE BITARTRATE/D5W 4MG/250ML
0.02 PLASTIC BAG, INJECTION (ML) INTRAVENOUS CONTINUOUS
Status: DISCONTINUED | OUTPATIENT
Start: 2018-11-16 | End: 2018-11-19

## 2018-11-16 RX ORDER — GLUCAGON 1 MG
1 KIT INJECTION
Status: DISCONTINUED | OUTPATIENT
Start: 2018-11-16 | End: 2018-11-18

## 2018-11-16 RX ORDER — ALBUMIN HUMAN 50 G/1000ML
12.5 SOLUTION INTRAVENOUS ONCE
Status: COMPLETED | OUTPATIENT
Start: 2018-11-16 | End: 2018-11-16

## 2018-11-16 RX ORDER — TACROLIMUS 1 MG/1
1 CAPSULE ORAL 2 TIMES DAILY
Status: DISCONTINUED | OUTPATIENT
Start: 2018-11-16 | End: 2018-11-17

## 2018-11-16 RX ORDER — ONDANSETRON 2 MG/ML
INJECTION INTRAMUSCULAR; INTRAVENOUS
Status: DISCONTINUED | OUTPATIENT
Start: 2018-11-16 | End: 2018-11-16

## 2018-11-16 RX ORDER — FENTANYL CITRATE 50 UG/ML
25 INJECTION, SOLUTION INTRAMUSCULAR; INTRAVENOUS
Status: DISCONTINUED | OUTPATIENT
Start: 2018-11-16 | End: 2018-11-19

## 2018-11-16 RX ORDER — LINEZOLID 2 MG/ML
600 INJECTION, SOLUTION INTRAVENOUS
Status: DISCONTINUED | OUTPATIENT
Start: 2018-11-16 | End: 2018-11-17

## 2018-11-16 RX ORDER — ETOMIDATE 2 MG/ML
INJECTION INTRAVENOUS
Status: DISCONTINUED | OUTPATIENT
Start: 2018-11-16 | End: 2018-11-16

## 2018-11-16 RX ORDER — ALBUMIN HUMAN 50 G/1000ML
SOLUTION INTRAVENOUS
Status: COMPLETED
Start: 2018-11-16 | End: 2018-11-16

## 2018-11-16 RX ADMIN — FENTANYL CITRATE 50 MCG: 50 INJECTION, SOLUTION INTRAMUSCULAR; INTRAVENOUS at 10:11

## 2018-11-16 RX ADMIN — FENTANYL CITRATE 100 MCG: 50 INJECTION, SOLUTION INTRAMUSCULAR; INTRAVENOUS at 10:11

## 2018-11-16 RX ADMIN — MIDAZOLAM HYDROCHLORIDE 2 MG: 1 INJECTION, SOLUTION INTRAMUSCULAR; INTRAVENOUS at 04:11

## 2018-11-16 RX ADMIN — SODIUM CHLORIDE, SODIUM GLUCONATE, SODIUM ACETATE, POTASSIUM CHLORIDE, MAGNESIUM CHLORIDE, SODIUM PHOSPHATE, DIBASIC, AND POTASSIUM PHOSPHATE: .53; .5; .37; .037; .03; .012; .00082 INJECTION, SOLUTION INTRAVENOUS at 04:11

## 2018-11-16 RX ADMIN — MIDAZOLAM HYDROCHLORIDE 2 MG: 1 INJECTION, SOLUTION INTRAMUSCULAR; INTRAVENOUS at 06:11

## 2018-11-16 RX ADMIN — MIDAZOLAM HYDROCHLORIDE 2 MG: 1 INJECTION, SOLUTION INTRAMUSCULAR; INTRAVENOUS at 10:11

## 2018-11-16 RX ADMIN — Medication 0.06 MCG/KG/MIN: at 03:11

## 2018-11-16 RX ADMIN — POTASSIUM PHOSPHATE, MONOBASIC AND POTASSIUM PHOSPHATE, DIBASIC 30 MMOL: 224; 236 INJECTION, SOLUTION INTRAVENOUS at 07:11

## 2018-11-16 RX ADMIN — ALBUMIN HUMAN 12.5 G: 50 SOLUTION INTRAVENOUS at 01:11

## 2018-11-16 RX ADMIN — ROCURONIUM BROMIDE 30 MG: 10 INJECTION, SOLUTION INTRAVENOUS at 05:11

## 2018-11-16 RX ADMIN — SODIUM CHLORIDE: 0.9 INJECTION, SOLUTION INTRAVENOUS at 10:11

## 2018-11-16 RX ADMIN — FENTANYL CITRATE 100 MCG: 50 INJECTION, SOLUTION INTRAMUSCULAR; INTRAVENOUS at 04:11

## 2018-11-16 RX ADMIN — METHYLPREDNISOLONE SODIUM SUCCINATE 20 MG: 40 INJECTION, POWDER, FOR SOLUTION INTRAMUSCULAR; INTRAVENOUS at 09:11

## 2018-11-16 RX ADMIN — ROCURONIUM BROMIDE 70 MG: 10 INJECTION, SOLUTION INTRAVENOUS at 04:11

## 2018-11-16 RX ADMIN — SODIUM CHLORIDE: 0.9 INJECTION, SOLUTION INTRAVENOUS at 09:11

## 2018-11-16 RX ADMIN — Medication 20 MG: at 05:11

## 2018-11-16 RX ADMIN — ALBUMIN HUMAN 25 G: 0.05 INJECTION, SOLUTION INTRAVENOUS at 09:11

## 2018-11-16 RX ADMIN — PROPOFOL 100 MG: 10 INJECTION, EMULSION INTRAVENOUS at 04:11

## 2018-11-16 RX ADMIN — CALCIUM CHLORIDE 1 G: 100 INJECTION, SOLUTION INTRAVENOUS at 06:11

## 2018-11-16 RX ADMIN — FLUCONAZOLE IN SODIUM CHLORIDE 200 MG: 2 INJECTION, SOLUTION INTRAVENOUS at 04:11

## 2018-11-16 RX ADMIN — PROPOFOL 5 MCG/KG/MIN: 10 INJECTION, EMULSION INTRAVENOUS at 06:11

## 2018-11-16 RX ADMIN — ALBUMIN HUMAN 12.5 G: 50 SOLUTION INTRAVENOUS at 02:11

## 2018-11-16 RX ADMIN — ROCURONIUM BROMIDE 50 MG: 10 INJECTION, SOLUTION INTRAVENOUS at 10:11

## 2018-11-16 RX ADMIN — SODIUM CHLORIDE 3 G: 9 INJECTION, SOLUTION INTRAVENOUS at 04:11

## 2018-11-16 RX ADMIN — ALBUMIN HUMAN 12.5 G: 0.05 INJECTION, SOLUTION INTRAVENOUS at 02:11

## 2018-11-16 RX ADMIN — HEPARIN SODIUM 5000 UNITS: 5000 INJECTION, SOLUTION INTRAVENOUS; SUBCUTANEOUS at 05:11

## 2018-11-16 RX ADMIN — ROCURONIUM BROMIDE 20 MG: 10 INJECTION, SOLUTION INTRAVENOUS at 06:11

## 2018-11-16 RX ADMIN — DEXTROSE 1000 MG: 5 SOLUTION INTRAVENOUS at 09:11

## 2018-11-16 RX ADMIN — ROCURONIUM BROMIDE 30 MG: 10 INJECTION, SOLUTION INTRAVENOUS at 04:11

## 2018-11-16 RX ADMIN — Medication 30 MG: at 04:11

## 2018-11-16 RX ADMIN — ETOMIDATE 24 MG: 2 INJECTION, SOLUTION INTRAVENOUS at 10:11

## 2018-11-16 RX ADMIN — MAGNESIUM SULFATE IN WATER 2 G: 40 INJECTION, SOLUTION INTRAVENOUS at 07:11

## 2018-11-16 RX ADMIN — ROCURONIUM BROMIDE 20 MG: 10 INJECTION, SOLUTION INTRAVENOUS at 11:11

## 2018-11-16 RX ADMIN — SODIUM CHLORIDE 3 G: 9 INJECTION, SOLUTION INTRAVENOUS at 10:11

## 2018-11-16 RX ADMIN — FENTANYL CITRATE 25 MCG: 50 INJECTION, SOLUTION INTRAMUSCULAR; INTRAVENOUS at 11:11

## 2018-11-16 RX ADMIN — SUGAMMADEX 200 MG: 100 INJECTION, SOLUTION INTRAVENOUS at 11:11

## 2018-11-16 RX ADMIN — CALCIUM CHLORIDE 1 G: 100 INJECTION, SOLUTION INTRAVENOUS at 11:11

## 2018-11-16 RX ADMIN — ONDANSETRON 4 MG: 2 INJECTION INTRAMUSCULAR; INTRAVENOUS at 11:11

## 2018-11-16 RX ADMIN — ALBUMIN HUMAN 12.5 G: 0.05 INJECTION, SOLUTION INTRAVENOUS at 01:11

## 2018-11-16 RX ADMIN — FAMOTIDINE 20 MG: 10 INJECTION, SOLUTION INTRAVENOUS at 09:11

## 2018-11-16 RX ADMIN — CALCIUM CHLORIDE 1 G: 100 INJECTION, SOLUTION INTRAVENOUS at 05:11

## 2018-11-16 NOTE — SUBJECTIVE & OBJECTIVE
Interval History:   NAEON, Tolerating SLED, MS improving this am. TxSx monitoring closely 2 fluid collection per US. Oxygenation remains stable on RA. CVP stable CVP 5. Hb stable this am s/p transfused overnight no plans for transfusion today unless Hb keeps dropping. Remains anuric. Off pressors. FLuid balance is Net neg 945 ml.     Review of patient's allergies indicates:   Allergen Reactions    Penicillins Nausea And Vomiting and Rash     Current Facility-Administered Medications   Medication Frequency    0.9%  NaCl infusion (CRRT USE ONLY) Continuous    0.9%  NaCl infusion (for blood administration) Q24H PRN    0.9%  NaCl infusion (for blood administration) Q24H PRN    0.9%  NaCl infusion (for blood administration) Q24H PRN    0.9%  NaCl infusion (for blood administration) Q24H PRN    albumin human 5% bottle 25 g Once    albumin human 5% bottle 25 g Once    dextrose 50% injection 12.5 g PRN    dextrose 50% injection 25 g PRN    famotidine (PF) injection 20 mg Daily    fluconazole (DIFLUCAN) IVPB 200 mg 100 mL Q24H    glucagon (human recombinant) injection 1 mg PRN    glucose chewable tablet 16 g PRN    glucose chewable tablet 24 g PRN    heparin (porcine) injection 5,000 Units Q8H    insulin aspart U-100 pen 0-4 Units PRN    insulin regular (Humulin R) 100 Units in sodium chloride 0.9% 100 mL infusion Continuous    magnesium sulfate 2g in water 50mL IVPB (premix) PRN    methylPREDNISolone sodium succinate injection 40 mg Q12H    Followed by    [START ON 11/16/2018] methylPREDNISolone sodium succinate injection 20 mg Q12H    Followed by    [START ON 11/17/2018] predniSONE tablet 20 mg Daily    mycophenolate (CELLCEPT) 1,000 mg in dextrose 5 % 250 mL IVPB Daily    norepinephrine 4 mg in dextrose 5% 250 mL infusion (premix) (titrating) Continuous    oxyCODONE immediate release tablet 5 mg Q6H PRN    oxyCODONE immediate release tablet Tab 10 mg Q4H PRN    propofol (DIPRIVAN) 10 mg/mL  infusion Continuous    ramelteon tablet 8 mg Nightly PRN    [START ON 11/21/2018] valganciclovir 50 mg/ml oral solution 450 mg Daily    vasopressin (PITRESSIN) 0.2 Units/mL in dextrose 5 % 100 mL infusion Continuous       Objective:     Vital Signs (Most Recent):  Temp: 98.5 °F (36.9 °C) (11/15/18 1802)  Pulse: 100 (11/15/18 1800)  Resp: 18 (11/15/18 1800)  BP: 103/63 (11/15/18 1800)  SpO2: 96 % (11/15/18 1800)  O2 Device (Oxygen Therapy): room air (11/15/18 1802) Vital Signs (24h Range):  Temp:  [97.7 °F (36.5 °C)-98.8 °F (37.1 °C)] 98.5 °F (36.9 °C)  Pulse:  [] 100  Resp:  [15-33] 18  SpO2:  [95 %-99 %] 96 %  BP: (103-125)/(62-83) 103/63  Arterial Line BP: ()/(55-83) 140/73     Weight: 98.1 kg (216 lb 4.3 oz) (11/15/18 0500)  Body mass index is 31.03 kg/m².  Body surface area is 2.2 meters squared.    I/O last 3 completed shifts:  In: 4443.9 [I.V.:3976.9; Blood:217; IV Piggyback:250]  Out: 4837 [Urine:5; Drains:810; Other:4022]    Physical Exam   Constitutional: He appears well-developed. He appears cachectic. He is cooperative. No distress. Nasal cannula in place.   Pleasantly disoriented but improved from yesterday.    HENT:   Head: Normocephalic and atraumatic.   Eyes: Conjunctivae are normal. Pupils are equal, round, and reactive to light.   Neck: Trachea normal. Neck supple. No JVD present.   Cardiovascular: Normal rate, regular rhythm, S1 normal, S2 normal and normal pulses. Exam reveals no gallop and no friction rub.   No murmur heard.  Pulmonary/Chest: Effort normal. He has decreased breath sounds in the right lower field and the left lower field. He has no rhonchi.   Abdominal: Soft. He exhibits distension. He exhibits no ascites. Bowel sounds are decreased. There is no tenderness.       Musculoskeletal: Normal range of motion. He exhibits edema (edema+ trace pitting sacral).   Neurological:   Pleasantly disoriented no focal deficit.    Skin: Skin is warm and dry. Capillary refill takes  less than 2 seconds.   Psychiatric: He has a normal mood and affect. His behavior is normal.   Vitals reviewed.      Significant Labs:  ABGs:   Recent Labs   Lab 11/15/18  0026   PH 7.517*   PCO2 31.1*   HCO3 25.2   POCSATURATED 98   BE 2     BMP:   Recent Labs   Lab 11/15/18  1509   *  126*     106   CO2 27  27   BUN 8  8   CREATININE 0.8  0.8   CALCIUM 7.6*  7.6*   MG 1.8  1.8     Cardiac Markers: No results for input(s): CKMB, TROPONINT, MYOGLOBIN in the last 168 hours.  CBC:   Recent Labs   Lab 11/15/18  1509   WBC 8.57  8.57   RBC 2.46*  2.46*   HGB 7.6*  7.6*   HCT 21.7*  21.7*   PLT 43*  43*   MCV 88  88   MCH 30.9  30.9   MCHC 35.0  35.0     CMP:   Recent Labs   Lab 11/15/18  0512 11/15/18  1509   *  136*  136* 126*  126*   CALCIUM 8.0*  8.0*  8.0* 7.6*  7.6*   ALBUMIN 2.4*  2.4*  2.4* 2.2*  2.2*   PROT 4.2*  --      137  137 138  138   K 4.6  4.6  4.6 4.7  4.7   CO2 25  25  25 27  27     103  103 106  106   BUN 18  18  18 8  8   CREATININE 1.4  1.4  1.4 0.8  0.8   ALKPHOS 201*  --    *  --    *  --    BILITOT 12.1*  --      Coagulation:   Recent Labs   Lab 11/15/18  0512   INR 1.2   APTT 26.3     Recent Labs   Lab 11/09/18  1737   COLORU Green*   SPECGRAV 1.025   PHUR 6.5   PROTEINUA 2+*   BACTERIA Occasional   NITRITE Negative   LEUKOCYTESUR Trace*   HYALINECASTS 0     All labs within the past 24 hours have been reviewed.     Significant Imaging:  Labs: Reviewed  US: Reviewed

## 2018-11-16 NOTE — PROGRESS NOTES
Notified Dr. Michael about pt becoming hypotensive with SBP 60s and MAP 40s. MD at bedside to assess. Serial labs drawn. JOSE A drain output 80 cc of sanguineous output during assessment. Chevron incision with no signs of bleeding. ABG drawn and STAT EKG. MD ordered 500 cc albumin, 2U PRBCs and levo gtt started.

## 2018-11-16 NOTE — ASSESSMENT & PLAN NOTE
S/P liver transplant     54 y/o man with ESLD, ESRD and DM2 now s/p liver transplant     Neuro:   - Sedation: none  - Pain control: PRN fentanyl     Pulmonary:   - RA; O2 as required  - daily cxr  - duonebs prn     Cardiac:  - Drips: none  - HDS     Renal:   -CRRT overnight  -trend BMP  - BUN/Cr: 13/1.5 -> 35/2.7-> 31/2.1-->7/0.7  - Nephrology on board, appreciate recommendations     Infectious Disease:   - AF  - Trend WBC - 12 -> 13->12->9.6  - Abx: completed course of Cipro (UCx 11/11 grew Enterococcus)  - Prophylaxis per transplant - Valcyte, Diflucan  - IS per Transplant - MMF, Prograf     Hematology/Oncology:  - H&H 7.4/20.8 -> 8.0/23 after 2U pRBCs -> 6.0 today; transfuse 2U pRBCs; h/h 8.1/23.9 s/p 2 units pRBC; 8.1/24.1   - Plt 84 -> 59 -> 50 -> 63 (after 1 U Plt) -> 52->51->42  - INR 1.2  - Trend CBC     Endocrine:  -  on AM labs  - SSI   - Endocrinology on board, appreciate recommendations     Fluids/Electrolytes/Nutrition/GI:   - renal diet  - Trend CMP  - Replace Lytes PRN  - liver US 11/12 - 6.6 cm complex fluid collection deep to left lobe; increased flow velocity in main hepatic artery      Prophylaxis:  - SQH  - Famotidine     Dispo:  Continue ICU care - possibly stepdown pending Nephrology recs for CRRT requirements  Primary team:Transplant

## 2018-11-16 NOTE — ASSESSMENT & PLAN NOTE
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 POD#4    Neuro  -acute change in mental status with confusion and agitation this 11/14  -controlled with PRN ativan   -monitor neuro exam today. Much more awake and alert today     CV  -off pressor support   -swan tricia catheter has been removed   -monitor on telemetry     Resp  -extubated yesterday w/o issues   -continue to monitor O2 sats     Heme  -H/H dropped to 8.1 following 1u pRBC yesterday   -Will schedule for OR today for washout of hematoma around the liver   -monitor H/H post-op     ID  -monitor fever and WBC   -f/u cultures     MSK  -OOBTC  -ambulation as tolerated      FEN/GI  -replace lytes   -Continue NPO until OR today      Endocrine  -insulin as needed. Monitor BG       -CRRT per renal     dispo  -continue ICU care.

## 2018-11-16 NOTE — SUBJECTIVE & OBJECTIVE
Scheduled Meds:   albumin human 5%        albumin human 5%  12.5 g Intravenous Once    famotidine (PF)  20 mg Intravenous Daily    fluconazole (DIFLUCAN) IVPB  200 mg Intravenous Q24H    heparin (porcine)  5,000 Units Subcutaneous Q8H    methylPREDNISolone sodium succinate  20 mg Intravenous Q12H    Followed by    [START ON 11/17/2018] predniSONE  20 mg Oral Daily    mycophenolate (CELLCEPT) IVPB  1,000 mg Intravenous Daily    norepinephrine bitartrate-D5W        norepinephrine bitartrate-D5W        tacrolimus  1 mg Oral BID    [START ON 11/21/2018] valganciclovir 50 mg/ml  450 mg Per NG tube Daily     Continuous Infusions:   sodium chloride 0.9% 200 mL/hr at 11/16/18 0700     PRN Meds:sodium chloride, sodium chloride, ciprofloxacin (CIPRO)400mg/200ml D5W IVPB, dextrose 50%, glucagon (human recombinant), insulin aspart U-100, linezolid 600mg/300ml, magnesium sulfate IVPB, oxyCODONE, oxyCODONE, ramelteon    Review of Systems  Objective:     Vital Signs (Most Recent):  Temp: (P) 97.8 °F (36.6 °C) (11/16/18 1300)  Pulse: (!) 113 (11/16/18 1245)  Resp: (!) 29 (11/16/18 1245)  BP: 130/78 (11/16/18 1230)  SpO2: 98 % (11/16/18 1245) Vital Signs (24h Range):  Temp:  [97.7 °F (36.5 °C)-98.6 °F (37 °C)] (P) 97.8 °F (36.6 °C)  Pulse:  [] 113  Resp:  [11-39] 29  SpO2:  [95 %-100 %] 98 %  BP: (103-134)/(63-81) 130/78  Arterial Line BP: (121-161)/(62-81) 121/70     Weight: 98.1 kg (216 lb 4.3 oz)  Body mass index is 31.03 kg/m².    Intake/Output - Last 3 Shifts       11/14 0700 - 11/15 0659 11/15 0700 - 11/16 0659 11/16 0700 - 11/17 0659    P.O.       I.V. (mL/kg) 1113.9 (11.4) 4978.7 (50.8) 4264.7 (43.5)    Blood 217 257 517    IV Piggyback 250      Total Intake(mL/kg) 1580.9 (16.1) 5235.7 (53.4) 4781.7 (48.7)    Urine (mL/kg/hr) 0 (0) 10 (0)     Drains 810  10    Other 3602 7196 797    Stool  0     Blood   100    Total Output 4412 7206 907    Net -2831.1 -1970.3 +3874.7           Stool Occurrence  1 x            Physical Exam   Constitutional: He is oriented to person, place, and time. He appears well-developed.   jaundiced   HENT:   Head: Normocephalic and atraumatic.   Eyes: Scleral icterus is present.   Cardiovascular: Normal rate, regular rhythm and intact distal pulses.   Pulmonary/Chest: Effort normal. No respiratory distress.   Abdominal: Soft. He exhibits no distension.   Incision with dried blood, c/d/i  Dressing over former site of right drain; ostomy bag over former left sided drain sites, collecting SS output   Musculoskeletal: He exhibits edema.   Neurological: He is alert and oriented to person, place, and time.   Skin: Skin is warm and dry.   Jaundice improving       Laboratory:  Immunosuppressants         Stop Route Frequency     tacrolimus capsule 1 mg      -- Oral 2 times daily     mycophenolate (CELLCEPT) 1,000 mg in dextrose 5 % 250 mL IVPB      -- IV Daily        Labs within the past 24 hours have been reviewed.    Diagnostic Results:  I have personally reviewed all pertinent imaging studies.

## 2018-11-16 NOTE — ANESTHESIA PREPROCEDURE EVALUATION
[facility]  Anesthesia Pre-Operative Evaluation         Patient Name: Femi Enciso  YOB: 1965  MRN: [id number]    SUBJECTIVE:     Pre-operative evaluation for Procedure(s) (LRB):  LAPAROTOMY, EXPLORATORY, AFTER LIVER TRANSPLANT (N/A)     11/16/2018    Femi Enciso is a 53 y.o. male w/ a significant PMHx of DM2, ESLD secondary to EtOH abuse in 09/2018, DEL progressing to ESRD. Recent hospitalization for ALI in [place], while he also got hypoxic, CT chest showed large pleural effusion s/p thoracentesis (negative for malignancy) and he was treated for PNA and C.diff as well. Presented to [facility] from [place] for liver OTHx evaluation. S/p Liver Tx 11/11. Patient has been receiving CRRT effectively, mental status improving. However patient's Hgb continues to drop and has required multiple units of blood. S/p Ex-lap 11/16, found intraabdominal hemorrhage and hematoma s/p evacuation.   Concern for continued intra-abdominal bleeding. Will return for additional Ex-lap 11/16.  S/p 1u pRBCs and platelets in OR earlier. On Levo drip 0.6mcg/kg/min.    Last NPO: Liquids and solids over 24hrs prior.  Access: Right IJ CVC, and Right subclavian trialysis. No PIV. A-line left wrist.     Patient now presents for the above procedure(s).    LDA:        Percutaneous Central Line Insertion/Assessment - double lumen  right internal jugular (Active)   Dressing biopatch in place 11/16/2018  3:00 AM   Securement secured w/ sutures 11/16/2018  3:00 AM   Additional Site Signs no erythema;no warmth;no edema;no pain;no palpable cord;no streak formation;no drainage 11/16/2018  3:00 AM   Distal Patency/Care infusing 11/16/2018  3:00 AM   Proximal Patency/Care infusing 11/16/2018  3:00 AM   Waveform normal 11/16/2018  3:00 AM   Line Interventions line leveled/zeroed 11/16/2018  3:00 AM   Dressing Change Due 11/19/18 11/15/2018  3:15 PM   Daily Line Review Performed 11/16/2018  3:00 AM   Number of days:             Tunneled  "Central Line Insertion/Assessment - Double Lumen  11/07/18 1350 right internal jugular (Active)   Dressing biopatch in place;dressing dry and intact;dressing reinforced 11/16/2018  3:00 AM   IV Device Securement sutures 11/16/2018  3:00 AM   Additional Site Signs no erythema;no warmth;no edema;no pain;no palpable cord;no streak formation;no drainage 11/16/2018  3:00 AM   Distal Patency/Care infusing 11/16/2018  3:00 AM   Proximal Patency/Care infusing 11/16/2018  3:00 AM   Waveform normal 11/16/2018  3:00 AM   Line Interventions line leveled/zeroed 11/15/2018  3:15 PM   Dressing Change Due 11/19/18 11/15/2018  3:15 PM   Daily Line Review Performed 11/16/2018  3:00 AM   Number of days: 8            Arterial Line 11/11/18 0159 Left Radial (Active)   Site Assessment Clean;Dry;Intact;No redness;No swelling;No warmth;No drainage 11/16/2018  3:00 AM   Line Status Pulsatile blood flow 11/16/2018  3:00 AM   Art Line Waveform Appropriate 11/16/2018  3:00 AM   Arterial Line Interventions Zeroed and calibrated 11/16/2018  3:00 AM   Color/Movement/Sensation Capillary refill less than 3 sec 11/16/2018  3:00 AM   Dressing Type Transparent 11/16/2018  3:00 AM   Dressing Status Old drainage 11/16/2018  3:00 AM   Dressing Intervention Dressing reinforced 11/16/2018  3:00 AM   Dressing Change Due 11/19/18 11/16/2018  3:00 AM   Number of days: 5            Urethral Catheter 11/11/18 0246 Straight-tip;Non-latex 16 Fr. (Active)   Site Assessment Clean;Intact 11/16/2018  3:00 AM   Collection Container Urimeter 11/16/2018  3:00 AM   Securement Method secured to top of thigh w/ adhesive device 11/16/2018  3:00 AM   Catheter Care Performed yes 11/16/2018  3:00 AM   Reason for Continuing Urinary Catheterization Critically ill in ICU requiring intensive monitoring 11/16/2018  3:00 AM   CAUTI Prevention Bundle StatLock in place w 1" slack;Intact seal between catheter & drainage tubing;Drainage bag off the floor;No dependent loops or " kinks;Green sheeting clip in use;Drainage bag not overfilled (<2/3 full);Drainage bag below bladder 11/15/2018  7:00 PM   Output (mL) 0 mL 11/16/2018  3:00 AM   Number of days: 5       Prev airway: Easy mask. Grade I view on DL, placed without complication.     Drips: Norepinephrine 0.6mcg/kg/min      Patient Active Problem List   Diagnosis    Acute liver failure    Jaundice    Acute renal failure    DEL (acute kidney injury)    ATN (acute tubular necrosis)    Severe alcohol dependence    Coagulopathy    Anemia of chronic disease    Thrombocytopenia    Hyponatremia    Metabolic acidosis    Moderate protein malnutrition    Type 2 diabetes mellitus without complication    Acute cystitis without hematuria    Pleural effusion associated with hepatic disorder    Decompensated hepatic cirrhosis    Alcohol use disorder, severe, in early remission    Acute maculopapular rash    Weakness    S/P liver transplant    Prophylactic immunotherapy    Adrenal cortical steroids causing adverse effect in therapeutic use    Acute hyperglycemia    Encounter for weaning from ventilator    Overweight (BMI 25.0-29.9)    Delirium    Anasarca       Review of patient's allergies indicates:   Allergen Reactions    Cefepime Rash    Penicillins Nausea And Vomiting and Rash     Full body rash from cefepime as well       Current Inpatient Medications:      Current Facility-Administered Medications on File Prior to Visit   Medication Dose Route Frequency Provider Last Rate Last Dose    0.9%  NaCl infusion (CRRT USE ONLY)   Intravenous Continuous Nikolay Nino  mL/hr at 11/16/18 0700      0.9%  NaCl infusion (for blood administration)   Intravenous Q24H PRN Nadia Michael MD        0.9%  NaCl infusion (for blood administration)   Intravenous Q24H PRN Taras Wylie MD        0.9%  NaCl infusion (for blood administration)   Intravenous Q24H PRN Nadia Michael MD        0.9%  NaCl infusion (for  blood administration)   Intravenous Q24H PRN Nadia Michael MD        albumin human 5% 5 % bottle             albumin human 5% bottle 12.5 g  12.5 g Intravenous Once Lara Lorenzo MD        ciprofloxacin (CIPRO)400mg/200ml D5W IVPB 400 mg  400 mg Intravenous On Call Procedure Petar Beavers MD        dextrose 50% injection 12.5 g  12.5 g Intravenous PRN Zoran Almeida NP        famotidine (PF) injection 20 mg  20 mg Intravenous Daily Amadou Lester Jr., MD   20 mg at 11/16/18 0919    fluconazole (DIFLUCAN) IVPB 200 mg 100 mL  200 mg Intravenous Q24H Amadou Lester Jr., MD   200 mg at 11/16/18 0435    glucagon (human recombinant) injection 1 mg  1 mg Intramuscular PRN Zoran Almeida NP        heparin (porcine) injection 5,000 Units  5,000 Units Subcutaneous Q8H Amadou Lester Jr., MD   5,000 Units at 11/16/18 0532    insulin aspart U-100 pen 0-5 Units  0-5 Units Subcutaneous Q6H PRN Zoran Almeida NP        linezolid 600mg/300ml 600 mg/300 mL IVPB 600 mg  600 mg Intravenous On Call Procedure Petar Beavers MD        magnesium sulfate 2g in water 50mL IVPB (premix)  2 g Intravenous PRN Nikolay Nino MD   2 g at 11/16/18 0711    methylPREDNISolone sodium succinate injection 20 mg  20 mg Intravenous Q12H Amadou Lester Jr., MD   20 mg at 11/16/18 0918    Followed by    [START ON 11/17/2018] predniSONE tablet 20 mg  20 mg Oral Daily Amadou Lester Jr., MD        mycophenolate (CELLCEPT) 1,000 mg in dextrose 5 % 250 mL IVPB  1,000 mg Intravenous Daily Taras Wylie  mL/hr at 11/16/18 0919 1,000 mg at 11/16/18 0919    norepinephrine 4 mg in dextrose 5% 250 mL infusion (premix) (titrating)  0.02 mcg/kg/min (Dosing Weight) Intravenous Continuous Nadia Michael MD        norepinephrine bitartrate-D5W 4 mg/250 mL (16 mcg/mL) infusion Soln             norepinephrine bitartrate-D5W 4 mg/250 mL (16 mcg/mL) infusion Soln             oxyCODONE immediate release  tablet 5 mg  5 mg Oral Q6H PRN Nadia Michael MD        oxyCODONE immediate release tablet Tab 10 mg  10 mg Oral Q4H PRN Nadia Michael MD   10 mg at 11/13/18 2043    phenylephrine HCl in 0.9% NaCl 1 mg/10 mL (100 mcg/mL) syringe             ramelteon tablet 8 mg  8 mg Oral Nightly PRN Christina Reno MD   8 mg at 11/13/18 2042    tacrolimus capsule 1 mg  1 mg Oral BID Chintan Davis PA-C        [START ON 11/21/2018] valganciclovir 50 mg/ml oral solution 450 mg  450 mg Per NG tube Daily Amadou Lester Jr., MD         Current Outpatient Medications on File Prior to Visit   Medication Sig Dispense Refill    calcium carbonate/vitamin D3 (VITAMIN D-3 ORAL) Take 1 tablet by mouth once daily.      cyanocobalamin (VITAMIN B-12) 100 MCG tablet Take 100 mcg by mouth once daily.      famotidine (PEPCID) 20 MG tablet Take 1 tablet (20 mg total) by mouth once daily. 30 tablet 11    fluconazole (DIFLUCAN) 200 MG Tab Take 1 tablet (200 mg total) by mouth once daily. Stop 12/11/18 30 tablet 0    folic acid (FOLVITE) 1 MG tablet Take 1 mg by mouth once daily.      lactulose (CHRONULAC) 10 gram/15 mL solution Take 20 g by mouth 3 (three) times daily.      multivitamin (THERAGRAN) per tablet Take 1 tablet by mouth once daily.      mycophenolate (CELLCEPT) 250 mg Cap Take 4 capsules (1,000 mg total) by mouth 2 (two) times daily. 240 capsule 2    omeprazole (PRILOSEC) 40 MG capsule Take 40 mg by mouth once daily.      ondansetron (ZOFRAN) 4 MG tablet Take 4 mg by mouth daily as needed for Nausea.      predniSONE (DELTASONE) 10 MG tablet Take by mouth daily: 20mg 11/17-11/23, 15mg 11/24-11/30, 10mg 12/1-12/7, 5mg 12/8-12/14, 2.5mg 12/15-12/21, Stop 12/22 40 tablet 0    rifAXIMin (XIFAXAN) 550 mg Tab Take 550 mg by mouth 2 (two) times daily.      sulfamethoxazole-trimethoprim 400-80mg (BACTRIM,SEPTRA) 400-80 mg per tablet Take 1 tablet by mouth once daily. STOP 5/10/19 30 tablet 5    tacrolimus  (PROGRAF) 1 MG Cap Take 6 capsules (6 mg total) by mouth every 12 (twelve) hours. 360 capsule 11    valGANciclovir (VALCYTE) 450 mg Tab Take 1 tablet (450 mg total) by mouth once daily. Stop 5/10/19 30 tablet 5       Past Surgical History:   Procedure Laterality Date    EGD (ESOPHAGOGASTRODUODENOSCOPY) N/A 11/6/2018    Performed by Duarte Jules MD at [facility] ([address] [place] [address])    ESOPHAGOGASTRODUODENOSCOPY N/A 11/6/2018    Procedure: EGD (ESOPHAGOGASTRODUODENOSCOPY);  Surgeon: Duarte Jules MD;  Location: [facility] ([address] [place] [address]);  Service: Endoscopy;  Laterality: N/A;    INSERTION OF TUNNELED CENTRAL VENOUS HEMODIALYSIS CATHETER N/A 11/7/2018    Procedure: INSERTION, CATHETER, CENTRAL VENOUS, HEMODIALYSIS, TUNNELED;  Surgeon: Brock Gonzalez MD;  Location: [facility] CATH LAB;  Service: Nephrology;  Laterality: N/A;    INSERTION, CATHETER, CENTRAL VENOUS, HEMODIALYSIS, TUNNELED N/A 11/7/2018    Performed by Brock Gonzalez MD at [facility] CATH LAB    LIVER TRANSPLANT N/A 11/11/2018    Procedure: TRANSPLANT, LIVER;  Surgeon: Shmuel Leary MD;  Location: [facility] OR [address] [place] [address];  Service: Transplant;  Laterality: N/A;    TRANSPLANT, LIVER N/A 11/11/2018    Performed by Shmuel Leary MD at [facility] OR [address] [place] [address]       Social History     Socioeconomic History    Marital status:      Spouse name: Not on file    Number of children: Not on file    Years of education: Not on file    Highest education level: Not on file   Social Needs    Financial resource strain: Not on file    Food insecurity - worry: Not on file    Food insecurity - inability: Not on file    Transportation needs - medical: Not on file    Transportation needs - non-medical: Not on file   Occupational History    Not on file   Tobacco Use    Smoking status: Never Smoker   Substance and Sexual Activity    Alcohol use: Not on file    Drug use: Not on file    Sexual activity: Not on file   Other Topics Concern    Not on file   Social  History Narrative    Not on file       OBJECTIVE:     Vital Signs Range (Last 24H):  Temp:  [36.5 °C (97.7 °F)-37 °C (98.6 °F)]   Pulse:  []   Resp:  [11-39]   BP: ()/(56-81)   SpO2:  [95 %-100 %]   Arterial Line BP: ()/(35-81)       Significant Labs:  Lab Results   Component Value Date    WBC 24.35 (H) 11/16/2018    HGB 8.7 (L) 11/16/2018    HCT 26.7 (L) 11/16/2018     (L) 11/16/2018     (H) 11/16/2018     (H) 11/16/2018     (L) 11/16/2018     (L) 11/16/2018     (L) 11/16/2018    K 5.1 11/16/2018    K 5.1 11/16/2018    K 5.1 11/16/2018     11/16/2018     11/16/2018     11/16/2018    CREATININE 0.8 11/16/2018    CREATININE 0.8 11/16/2018    CREATININE 0.8 11/16/2018    BUN 11 11/16/2018    BUN 11 11/16/2018    BUN 11 11/16/2018    CO2 21 (L) 11/16/2018    CO2 21 (L) 11/16/2018    CO2 21 (L) 11/16/2018    PSA 0.39 11/02/2018    INR 1.1 11/16/2018    HGBA1C 4.4 11/09/2018       Diagnostic Studies:   US Liver  Interval development of two large complex collections adjacent to the right hepatic lobe and within the retroperitoneum adjacent to the right kidney respectively.  Findings concerning for hematomas in light of given clinical history.    Grossly stable elevated velocity within the main hepatic artery likely secondary to anatomic tortuosity.  Otherwise unremarkable vascular appearance of the liver allograft.    EKG  Vent. Rate : 080 BPM     Atrial Rate : 080 BPM     P-R Int : 134 ms          QRS Dur : 116 ms      QT Int : 424 ms       P-R-T Axes : 030 -04 021 degrees     QTc Int : 489 ms    Normal sinus rhythm  Prolonged QT  Abnormal ECG  When compared with ECG of 09-NOV-2018 13:04,  No significant change was found  Confirmed by MIKO LUCERO MD ([id number]) on 11/11/2018 10:51:09 AM    2D ECHO:  Results for orders placed or performed during the hospital encounter of 10/27/18   2D echo with color flow doppler   Result Value Ref Range    QEF 73 55 -  65    Diastolic Dysfunction No     Est. PA Systolic Pressure 32.16     Tricuspid Valve Regurgitation TRIVIAL          ASSESSMENT/PLAN:       Pre-op Assessment    I have reviewed the Patient Summary Reports.     I have reviewed the Nursing Notes.   I have reviewed the Medications.     Review of Systems  Anesthesia Hx:  No previous Anesthesia  Neg history of prior surgery.  Denies Personal Hx of Anesthesia complications.   Social:  Non-Smoker, Alcohol Use    Hematology/Oncology:         -- Anemia:   Cardiovascular:   Hypertension hyperlipidemia ECG has been reviewed.    Pulmonary:   Shortness of breath (2/2 plueral effusion (drained)) Has pleural effusion   Renal/:   Chronic Renal Disease, Dialysis    Hepatic/GI:   Liver Disease, Hepatitis    Endocrine:   Diabetes    Psych:   Psychiatric History          Physical Exam  General:  Well nourished    Airway/Jaw/Neck:  Airway Findings: Mouth Opening: Normal Tongue: Normal  Mallampati: I  Improves to I with phonation.  TM Distance: Normal, at least 6 cm      Dental:  Dental Findings: In tact   Chest/Lungs:  Chest/Lungs Findings: Clear to auscultation, Normal Respiratory Rate     Heart/Vascular:  Heart Findings: Rate: Normal  Rhythm: Regular Rhythm  Sounds: Normal     Abdomen:  Abdomen Findings: Normal    Musculoskeletal:  Musculoskeletal Findings: Normal   Skin:  Skin Findings: Normal    Mental Status:  Mental Status Findings:  Cooperative, Alert and Oriented         Anesthesia Plan  Type of Anesthesia, risks & benefits discussed:  Anesthesia Type:  general  Patient's Preference:   Intra-op Monitoring Plan: standard ASA monitors, arterial line and central line  Intra-op Monitoring Plan Comments:   Post Op Pain Control Plan: multimodal analgesia, IV/PO Opioids PRN and per primary service following discharge from PACU  Post Op Pain Control Plan Comments:   Induction:   IV  Beta Blocker:         Informed Consent: Patient representative understands risks and agrees with  Anesthesia plan.  Questions answered. Anesthesia consent signed with patient representative.  ASA Score: 4  emergent   Day of Surgery Review of History & Physical:    H&P update referred to the surgeon.         Ready For Surgery From Anesthesia Perspective.

## 2018-11-16 NOTE — PROGRESS NOTES
[facility]  Nephrology  Progress Note    Patient Name: Femi Enciso  MRN: [id number]  Admission Date: 10/27/2018  Hospital Length of Stay: 19 days  Attending Provider: Femi Patel MD   Primary Care Physician: Primary Doctor No  Principal Problem:S/P liver transplant    Subjective:     HPI: 52 y/o man with DM2 presents to the ED with family for liver failure (likely due to EtOH abundant history of drinking - diagnosed in Sept 2018 in [place]).  He reports jaundice, generalized weakness, nausea, diarrhea, and decreased appetite since Sept 2018.  He is from [place], [place], and Dr. Sharma (patients physician) recommended bringing him to hospital for evaluation.  Patient denies any fever, chills, vomiting, chest pain, palpitations, SOB, abdominal pain.      Nephrology consulted for evaluation/management Adri.     Interval History:   NAEON, Tolerating SLED, MS improving this am. TxSx monitoring closely 2 fluid collection per US. Oxygenation remains stable on RA. CVP stable CVP 5. Hb stable this am s/p transfused overnight no plans for transfusion today unless Hb keeps dropping. Remains anuric. Off pressors. FLuid balance is Net neg 945 ml.     Review of patient's allergies indicates:   Allergen Reactions    Penicillins Nausea And Vomiting and Rash     Current Facility-Administered Medications   Medication Frequency    0.9%  NaCl infusion (CRRT USE ONLY) Continuous    0.9%  NaCl infusion (for blood administration) Q24H PRN    0.9%  NaCl infusion (for blood administration) Q24H PRN    0.9%  NaCl infusion (for blood administration) Q24H PRN    0.9%  NaCl infusion (for blood administration) Q24H PRN    albumin human 5% bottle 25 g Once    albumin human 5% bottle 25 g Once    dextrose 50% injection 12.5 g PRN    dextrose 50% injection 25 g PRN    famotidine (PF) injection 20 mg Daily    fluconazole (DIFLUCAN) IVPB 200 mg 100 mL Q24H    glucagon (human recombinant) injection 1 mg PRN    glucose  chewable tablet 16 g PRN    glucose chewable tablet 24 g PRN    heparin (porcine) injection 5,000 Units Q8H    insulin aspart U-100 pen 0-4 Units PRN    insulin regular (Humulin R) 100 Units in sodium chloride 0.9% 100 mL infusion Continuous    magnesium sulfate 2g in water 50mL IVPB (premix) PRN    methylPREDNISolone sodium succinate injection 40 mg Q12H    Followed by    [START ON 11/16/2018] methylPREDNISolone sodium succinate injection 20 mg Q12H    Followed by    [START ON 11/17/2018] predniSONE tablet 20 mg Daily    mycophenolate (CELLCEPT) 1,000 mg in dextrose 5 % 250 mL IVPB Daily    norepinephrine 4 mg in dextrose 5% 250 mL infusion (premix) (titrating) Continuous    oxyCODONE immediate release tablet 5 mg Q6H PRN    oxyCODONE immediate release tablet Tab 10 mg Q4H PRN    propofol (DIPRIVAN) 10 mg/mL infusion Continuous    ramelteon tablet 8 mg Nightly PRN    [START ON 11/21/2018] valganciclovir 50 mg/ml oral solution 450 mg Daily    vasopressin (PITRESSIN) 0.2 Units/mL in dextrose 5 % 100 mL infusion Continuous       Objective:     Vital Signs (Most Recent):  Temp: 98.5 °F (36.9 °C) (11/15/18 1802)  Pulse: 100 (11/15/18 1800)  Resp: 18 (11/15/18 1800)  BP: 103/63 (11/15/18 1800)  SpO2: 96 % (11/15/18 1800)  O2 Device (Oxygen Therapy): room air (11/15/18 1802) Vital Signs (24h Range):  Temp:  [97.7 °F (36.5 °C)-98.8 °F (37.1 °C)] 98.5 °F (36.9 °C)  Pulse:  [] 100  Resp:  [15-33] 18  SpO2:  [95 %-99 %] 96 %  BP: (103-125)/(62-83) 103/63  Arterial Line BP: ()/(55-83) 140/73     Weight: 98.1 kg (216 lb 4.3 oz) (11/15/18 0500)  Body mass index is 31.03 kg/m².  Body surface area is 2.2 meters squared.    I/O last 3 completed shifts:  In: 4443.9 [I.V.:3976.9; Blood:217; IV Piggyback:250]  Out: 4837 [Urine:5; Drains:810; Other:4022]    Physical Exam   Constitutional: He appears well-developed. He appears cachectic. He is cooperative. No distress. Nasal cannula in place.   Pleasantly  disoriented but improved from yesterday.    HENT:   Head: Normocephalic and atraumatic.   Eyes: Conjunctivae are normal. Pupils are equal, round, and reactive to light.   Neck: Trachea normal. Neck supple. No JVD present.   Cardiovascular: Normal rate, regular rhythm, S1 normal, S2 normal and normal pulses. Exam reveals no gallop and no friction rub.   No murmur heard.  Pulmonary/Chest: Effort normal. He has decreased breath sounds in the right lower field and the left lower field. He has no rhonchi.   Abdominal: Soft. He exhibits distension. He exhibits no ascites. Bowel sounds are decreased. There is no tenderness.       Musculoskeletal: Normal range of motion. He exhibits edema (edema+ trace pitting sacral).   Neurological:   Pleasantly disoriented no focal deficit.    Skin: Skin is warm and dry. Capillary refill takes less than 2 seconds.   Psychiatric: He has a normal mood and affect. His behavior is normal.   Vitals reviewed.      Significant Labs:  ABGs:   Recent Labs   Lab 11/15/18  0026   PH 7.517*   PCO2 31.1*   HCO3 25.2   POCSATURATED 98   BE 2     BMP:   Recent Labs   Lab 11/15/18  1509   *  126*     106   CO2 27  27   BUN 8  8   CREATININE 0.8  0.8   CALCIUM 7.6*  7.6*   MG 1.8  1.8     Cardiac Markers: No results for input(s): CKMB, TROPONINT, MYOGLOBIN in the last 168 hours.  CBC:   Recent Labs   Lab 11/15/18  1509   WBC 8.57  8.57   RBC 2.46*  2.46*   HGB 7.6*  7.6*   HCT 21.7*  21.7*   PLT 43*  43*   MCV 88  88   MCH 30.9  30.9   MCHC 35.0  35.0     CMP:   Recent Labs   Lab 11/15/18  0512 11/15/18  1509   *  136*  136* 126*  126*   CALCIUM 8.0*  8.0*  8.0* 7.6*  7.6*   ALBUMIN 2.4*  2.4*  2.4* 2.2*  2.2*   PROT 4.2*  --      137  137 138  138   K 4.6  4.6  4.6 4.7  4.7   CO2 25  25  25 27  27     103  103 106  106   BUN 18 18  18 8  8   CREATININE 1.4  1.4  1.4 0.8  0.8   ALKPHOS 201*  --    *  --    *  --     BILITOT 12.1*  --      Coagulation:   Recent Labs   Lab 11/15/18  0512   INR 1.2   APTT 26.3     Recent Labs   Lab 11/09/18  1737   COLORU Green*   SPECGRAV 1.025   PHUR 6.5   PROTEINUA 2+*   BACTERIA Occasional   NITRITE Negative   LEUKOCYTESUR Trace*   HYALINECASTS 0     All labs within the past 24 hours have been reviewed.     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   - s/p OHLTx 11/11/2018   - remains anuric   - Had intra-op SLED  - CVP 1 now 5    Plan:  - Tolerating SLED well overnight for metabolic clearance, will continue and reassess in am   - MS improving but still disoriented  - Will cont SELD for metabolic clearance and volume management overnight and reaccess in am   - -350 ml/hr as tolerated  - Remains anuric  - Strict I/O and chart  - Avoid nephrotoxic medications  - please keep Hb > 7 gm/dL or higher if symptomatic  - Medication doses adjusted to GFR           Thank you for your consult. I will follow-up with patient. Please contact us if you have any additional questions.    Nikolay Nino MD  Nephrology  [facility]

## 2018-11-16 NOTE — ASSESSMENT & PLAN NOTE
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   - s/p OHLTx 11/11/2018   - remains anuric   - Had intra-op SLED  - CVP 1 now 5    Plan:  - Tolerating SLED well overnight for metabolic clearance, will continue and reassess in am   - MS improving but still disoriented  - Will cont SELD for metabolic clearance and volume management overnight and reaccess in am   - -350 ml/hr as tolerated  - Remains anuric  - Strict I/O and chart  - Avoid nephrotoxic medications  - please keep Hb > 7 gm/dL or higher if symptomatic  - Medication doses adjusted to GFR

## 2018-11-16 NOTE — ANESTHESIA POSTPROCEDURE EVALUATION
"Anesthesia Post Evaluation    Patient: Femi Enciso    Procedure(s) Performed: Procedure(s) (LRB):  LAPAROTOMY, EXPLORATORY, AFTER LIVER TRANSPLANT (N/A)    Final Anesthesia Type: general  Patient location during evaluation: ICU  Patient participation: Yes- Able to Participate  Level of consciousness: awake  Post-procedure vital signs: reviewed and stable  Pain management: adequate  Airway patency: patent  PONV status at discharge: No PONV  Anesthetic complications: yes    Mandi-operative Events Comments: Around 1500 levophed was started and it appears that the patient  may still be bleeding. The patient is going to the OR for exploratory lap  Cardiovascular status: hemodynamically stable  Respiratory status: spontaneous ventilation and face mask  Hydration status: euvolemic  Follow-up not needed.        Visit Vitals  /78   Pulse (!) 113   Temp (P) 36.6 °C (97.8 °F) (Oral)   Resp (!) 29   Ht 5' 10" (1.778 m)   Wt 98.1 kg (216 lb 4.3 oz)   SpO2 98%   BMI 31.03 kg/m²       Pain/Torrey Score: Pain Assessment Performed: Yes (11/16/2018  7:15 AM)  Presence of Pain: denies (11/16/2018  7:15 AM)        "

## 2018-11-16 NOTE — ASSESSMENT & PLAN NOTE
Managed per LTS.   avoid hypoglycemia  Optimize BG control for surgical wound healing.     Lab Results   Component Value Date     (H) 11/16/2018     (H) 11/16/2018    GGT 1,023 (H) 11/16/2018    ALKPHOS 321 (H) 11/16/2018    BILITOT 14.6 (H) 11/16/2018

## 2018-11-16 NOTE — PROGRESS NOTES
Pt stable overnight. Temperature remains afebrile. CRRT briefly paused for clotting and restarted at previous UF rate. UOP anuric. JOSE A drainage site draining continuously. Pt remains disoriented to place. Insulin gtt decreased. Mother at bedside and attentive to patient. Pt currently appears asleep, free of harm with call light in reach. Please refer to flowsheet data for full assessment details.

## 2018-11-16 NOTE — OP NOTE
Operative Report    Date of Procedure: 11/16/2018    Surgeons:  Surgeon(s) and Role:     * Amadou Lester Jr., MD - Primary     * Taras Wylie MD - Fellow    First Assistant Attestation:  The indicated resident served as first assistant for this procedure.    Pre-operative Diagnosis: intraabdominal hemorrhage and hematoma  Post-operative Diagnosis: same    Procedure(s) Perfomed: Exploration of abdomen, Evacuation of hematoma and Open needle biopsy of liver  Anesthesia: General endotracheal  Estimated Blood Loss: 50 ml active blood loss;  About 1.5L of shed blood and clot  Blood Products Administered: 1U PRBC, PLT ordered and given in SICU    Findings: significant clot in the retroperitoneum, poster to the right lobe of the liver.  There was a tract posterior to the right kidney containing significant clot.  Small bleeding area of retroperitoneal fat requiring a single stitch.  Otherwise just ooze in retroperitoneal fat.  Cautery with argon beam, suture ligation and placement of hemostatic agents (nuknit, fibrillar, surgiflow) were performed.   Specimens: liver biopsy x2 cores  Drains: 19f Sarkis drains x 1    Preamble  Indications and Patient Counseling: The procedure was thoroughly explained to the patient and/or family members as appropriate, including potential risks, complications, and alternatives.  The risks associated with not undergoing the proposed procedure were also discussed.  All questions were answered, and the patient and/or family members voiced understanding and agreed to proceed with the operation.    Time-Out: A complete time out was carried out prior to incision, with confirmation of patient identity, correct procedure, correct operative site, appropriate antibiotic prophylaxis, review of any known allergies, and presence of all needed equipment.    Procedure in Detail  Following the induction of general endotracheal anesthesia, appropriate arterial and venous lines were placed by the  anesthesia team.  Care was taken to pad all pressure points and avoid any potential traction injuries from positioning. The urinary bladder was catheterized.  Sequential compression boots and Sang Huggers were used. The abdomen was sterilely prepped and draped.    The abdomen was entered by re-opening the liver transplant incision.  Cultures were obtained of any subcutaneous and peritoneal fluid or clot as appropriate.  Significant peritoneal fluid was present, and it was bloody in nature.  Significant hematoma was present. There was minimal active bleeding. The bile duct was assessed, and there was not visible evidence of a bile leak.  The hepatic artery was palpated, and the thrill was excellent. The liver itself was soft to palpation, and had a well-perfused appearance.  Additional intraabdominal findings included significant clot in the retroperitoneum, poster to the right lobe of the liver.  There was a tract posterior to the right kidney containing significant clot.  Small bleeding area of retroperitoneal fat requiring a single stitch.  Otherwise just ooze in retroperitoneal fat.  Cautery with argon beam, suture ligation and placement of hemostatic agents (nuknit, fibrillar, surgiflow) were performed.     After the above exploration, all identifiable bleeding sites were addressed using electrocautery, argon beam coagulation, suture ligatures, and topical hemostatic agents as appropriate.  A needle biopsy of the liver was obtained.        A final check for hemostasis was made.  1 19f Sarkis drains were placed through separate incisions below the transverse abdominal incision and placed in the usual positions. The cavity was irrigated with saline. The abdomen was closed in layers using running #1 PDS suture. The incision was irrigated and the skin was closed with staples. At the end of the case all instrument, needle, and sponge counts were correct. The patient was taken from the OR in stable condition.

## 2018-11-16 NOTE — SUBJECTIVE & OBJECTIVE
"Interval HPI:   Overnight events: Remains in ICU, mild confusion overnight.  BG below goal overnight on transition IV insulin infusion at 0.3 u/hr, lowered to 0.1 u/hr per protocol for .  Solu medrol 20 mg BID, standard steroid taper.  On IV antibiotics.  CRRT overnight per nephrology.  Eating:   NPO  Nausea: No  Hypoglycemia and intervention: No  Fever: No  TPN and/or TF: No    /80 (BP Location: Left arm, Patient Position: Lying)   Pulse 91   Temp 98.4 °F (36.9 °C) (Oral)   Resp (!) 23   Ht 5' 10" (1.778 m)   Wt 98.1 kg (216 lb 4.3 oz)   SpO2 99%   BMI 31.03 kg/m²     Labs Reviewed and Include    Recent Labs   Lab 11/16/18  0320   *  114*   CALCIUM 7.8*  7.8*   ALBUMIN 2.2*  2.2*   PROT 4.2*     138   K 4.5  4.5   CO2 26  26     105   BUN 7  7   CREATININE 0.7  0.7   ALKPHOS 321*   *   *   BILITOT 14.6*     Lab Results   Component Value Date    WBC 9.64 11/16/2018    HGB 8.1 (L) 11/16/2018    HCT 24.1 (L) 11/16/2018    MCV 92 11/16/2018    PLT 42 (L) 11/16/2018     No results for input(s): TSH, FREET4 in the last 168 hours.  Lab Results   Component Value Date    HGBA1C 4.4 11/09/2018       Nutritional status:   Body mass index is 31.03 kg/m².  Lab Results   Component Value Date    ALBUMIN 2.2 (L) 11/16/2018    ALBUMIN 2.2 (L) 11/16/2018    ALBUMIN 2.2 (L) 11/15/2018    ALBUMIN 2.2 (L) 11/15/2018     Lab Results   Component Value Date    PREALBUMIN 7 (L) 10/27/2018       Estimated Creatinine Clearance: 143.3 mL/min (based on SCr of 0.7 mg/dL).    Accu-Checks  Recent Labs     11/15/18  0624 11/15/18  0733 11/15/18  0814 11/15/18  0919 11/15/18  1155 11/15/18  1717 11/15/18  2037 11/16/18  0010 11/16/18  0435 11/16/18  0709   POCTGLUCOSE 131* 147* 155* 137* 148* 117* 110 103 117* 101       Current Medications and/or Treatments Impacting Glycemic Control  Immunotherapy:    Immunosuppressants         Stop Route Frequency     mycophenolate (CELLCEPT) 1,000 mg " in dextrose 5 % 250 mL IVPB      -- IV Daily        Steroids:   Hormones (From admission, onward)    Start     Stop Route Frequency Ordered    11/17/18 0900  predniSONE tablet 20 mg  (methylprednisolone taper panel)      -- Oral Daily 11/11/18 1208    11/16/18 0900  methylPREDNISolone sodium succinate injection 20 mg  (methylprednisolone taper panel)      11/17 0859 IV Every 12 hours 11/11/18 1208    11/11/18 1445  vasopressin (PITRESSIN) 0.2 Units/mL in dextrose 5 % 100 mL infusion      -- IV Continuous 11/11/18 1337    11/09/18 1345  methylPREDNISolone sodium succinate injection 500 mg  (Med - Immunosuppression Induction Therapy (Methylprednisolone))      11/10 0144 IV Once pre-op 11/09/18 1236        Pressors:    Autonomic Drugs (From admission, onward)    Start     Stop Route Frequency Ordered    11/11/18 1335  norepinephrine 4 mg in dextrose 5% 250 mL infusion (premix) (titrating)     Question Answer Comment   Titrate by: (in mcg/kg/min) 0.01    Titrate interval: (in minutes) 2    Titrate to maintain: (MAP or SBP) MAP    Greater than: (in mmHg) 65    Maximum dose: (in mcg/kg/min) 2        -- IV Continuous 11/11/18 1337        Hyperglycemia/Diabetes Medications:   Antihyperglycemics (From admission, onward)    Start     Stop Route Frequency Ordered    11/15/18 1015  insulin regular (Humulin R) 100 Units in sodium chloride 0.9% 100 mL infusion      -- IV Continuous 11/15/18 0913    11/15/18 1013  insulin aspart U-100 pen 0-4 Units      -- SubQ As needed (PRN) 11/15/18 0913

## 2018-11-16 NOTE — ASSESSMENT & PLAN NOTE
BG goal 140-180    Discontinue transition IV insulin infusion  Low dose corrections scale  Change BG monitoring to q 6 hours while NPO    Discharge planning: HENNY

## 2018-11-16 NOTE — PROGRESS NOTES
"[facility]  Endocrinology  Progress Note    Admit Date: 10/27/2018     Reason for Consult: Management of Hyperglycemia     Surgical Procedure and Date: liver transplant 11/11/18    HPI:   Patient is a 53 y.o. male with a diagnosis of ESLD secondary to ETOH abuse and DEL with ESRD with RRT. Patient is now s/p liver transplant. Endocrinology consulted for BG management.  Patient reports having DM x 1 year, takes Metformin 1000 mg BID.      Lab Results   Component Value Date    HGBA1C 4.4 11/09/2018             Interval HPI:   Overnight events: Remains in ICU, mild confusion overnight.  BG below goal overnight on transition IV insulin infusion at 0.3 u/hr, lowered to 0.1 u/hr per protocol for .  Solu medrol 20 mg BID, standard steroid taper.  On IV antibiotics.  CRRT overnight per nephrology.  Eating:   NPO  Nausea: No  Hypoglycemia and intervention: No  Fever: No  TPN and/or TF: No    /80 (BP Location: Left arm, Patient Position: Lying)   Pulse 91   Temp 98.4 °F (36.9 °C) (Oral)   Resp (!) 23   Ht 5' 10" (1.778 m)   Wt 98.1 kg (216 lb 4.3 oz)   SpO2 99%   BMI 31.03 kg/m²      Labs Reviewed and Include    Recent Labs   Lab 11/16/18  0320   *  114*   CALCIUM 7.8*  7.8*   ALBUMIN 2.2*  2.2*   PROT 4.2*     138   K 4.5  4.5   CO2 26  26     105   BUN 7  7   CREATININE 0.7  0.7   ALKPHOS 321*   *   *   BILITOT 14.6*     Lab Results   Component Value Date    WBC 9.64 11/16/2018    HGB 8.1 (L) 11/16/2018    HCT 24.1 (L) 11/16/2018    MCV 92 11/16/2018    PLT 42 (L) 11/16/2018     No results for input(s): TSH, FREET4 in the last 168 hours.  Lab Results   Component Value Date    HGBA1C 4.4 11/09/2018       Nutritional status:   Body mass index is 31.03 kg/m².  Lab Results   Component Value Date    ALBUMIN 2.2 (L) 11/16/2018    ALBUMIN 2.2 (L) 11/16/2018    ALBUMIN 2.2 (L) 11/15/2018    ALBUMIN 2.2 (L) 11/15/2018     Lab Results   Component Value " Date    PREALBUMIN 7 (L) 10/27/2018       Estimated Creatinine Clearance: 143.3 mL/min (based on SCr of 0.7 mg/dL).    Accu-Checks  Recent Labs     11/15/18  0624 11/15/18  0733 11/15/18  0814 11/15/18  0919 11/15/18  1155 11/15/18  1717 11/15/18  2037 11/16/18  0010 11/16/18  0435 11/16/18  0709   POCTGLUCOSE 131* 147* 155* 137* 148* 117* 110 103 117* 101       Current Medications and/or Treatments Impacting Glycemic Control  Immunotherapy:    Immunosuppressants         Stop Route Frequency     mycophenolate (CELLCEPT) 1,000 mg in dextrose 5 % 250 mL IVPB      -- IV Daily        Steroids:   Hormones (From admission, onward)    Start     Stop Route Frequency Ordered    11/17/18 0900  predniSONE tablet 20 mg  (methylprednisolone taper panel)      -- Oral Daily 11/11/18 1208    11/16/18 0900  methylPREDNISolone sodium succinate injection 20 mg  (methylprednisolone taper panel)      11/17 0859 IV Every 12 hours 11/11/18 1208    11/11/18 1445  vasopressin (PITRESSIN) 0.2 Units/mL in dextrose 5 % 100 mL infusion      -- IV Continuous 11/11/18 1337    11/09/18 1345  methylPREDNISolone sodium succinate injection 500 mg  (Med - Immunosuppression Induction Therapy (Methylprednisolone))      11/10 0144 IV Once pre-op 11/09/18 1236        Pressors:    Autonomic Drugs (From admission, onward)    Start     Stop Route Frequency Ordered    11/11/18 1335  norepinephrine 4 mg in dextrose 5% 250 mL infusion (premix) (titrating)     Question Answer Comment   Titrate by: (in mcg/kg/min) 0.01    Titrate interval: (in minutes) 2    Titrate to maintain: (MAP or SBP) MAP    Greater than: (in mmHg) 65    Maximum dose: (in mcg/kg/min) 2        -- IV Continuous 11/11/18 1337        Hyperglycemia/Diabetes Medications:   Antihyperglycemics (From admission, onward)    Start     Stop Route Frequency Ordered    11/15/18 1015  insulin regular (Humulin R) 100 Units in sodium chloride 0.9% 100 mL infusion      -- IV Continuous 11/15/18 0913     11/15/18 1013  insulin aspart U-100 pen 0-4 Units      -- SubQ As needed (PRN) 11/15/18 0913          ASSESSMENT and PLAN    * S/P liver transplant    Managed per LTS.   avoid hypoglycemia  Optimize BG control for surgical wound healing.     Lab Results   Component Value Date     (H) 11/16/2018     (H) 11/16/2018    GGT 1,023 (H) 11/16/2018    ALKPHOS 321 (H) 11/16/2018    BILITOT 14.6 (H) 11/16/2018            Type 2 diabetes mellitus without complication    BG goal 140-180    Discontinue transition IV insulin infusion  Low dose corrections scale  Change BG monitoring to q 6 hours while NPO    Discharge planning: TBD       Adrenal cortical steroids causing adverse effect in therapeutic use    On standard steroid taper per transplant team; may elevate BG readings         DEL (acute kidney injury)    Avoid insulin stacking and hypoglycemia.  CRRT per nephrology  Lab Results   Component Value Date    CREATININE 0.8 11/16/2018    CREATININE 0.8 11/16/2018    CREATININE 0.8 11/16/2018            Acute renal failure    Avoid insulin stacking and hypoglycemia.         Prophylactic immunotherapy    May increase insulin resistance.        Overweight (BMI 25.0-29.9)    may contribute to insulin resistance  Body mass index is 31.03 kg/m².             Zoran Almeida, ABAD  Endocrinology  [facility]

## 2018-11-16 NOTE — PROGRESS NOTES
[facility]  Critical Care - Surgery  Progress Note    Patient Name: Femi Enciso  MRN: [id number]  Admission Date: 10/27/2018  Hospital Length of Stay: 20 days  Code Status: Full Code  Attending Provider: Femi Patel MD  Primary Care Provider: Primary Doctor No   Principal Problem: S/P liver transplant    Subjective:     Hospital/ICU Course:  11/11: s/p liver transplant  11/12 - extubated, weaned off pressors; pulled out all 3 JOSE A drains  11/13 - CRRT held overnight; 1U Plt  11/14 -2u pRBC  11/15- 1u prbc    Interval History/Significant Events: On CRRT this AM. HDS. Pt's mental status imroved from previous. Alert to person and place this AM.     Follow-up For: Procedure(s) (LRB):  TRANSPLANT, LIVER (N/A)    Post-Operative Day: 5 Days Post-Op    Objective:     Vital Signs (Most Recent):  Temp: 97.8 °F (36.6 °C) (11/16/18 0800)  Pulse: 91 (11/16/18 0800)  Resp: (!) 24 (11/16/18 0800)  BP: 126/77 (11/16/18 0800)  SpO2: 99 % (11/16/18 0800) Vital Signs (24h Range):  Temp:  [97.8 °F (36.6 °C)-98.6 °F (37 °C)] 97.8 °F (36.6 °C)  Pulse:  [] 91  Resp:  [11-30] 24  SpO2:  [95 %-100 %] 99 %  BP: (103-129)/(63-83) 126/77  Arterial Line BP: (124-159)/(62-81) 154/76     Weight: 98.1 kg (216 lb 4.3 oz)  Body mass index is 31.03 kg/m².      Intake/Output Summary (Last 24 hours) at 11/16/2018 0820  Last data filed at 11/16/2018 0800  Gross per 24 hour   Intake 3067.7 ml   Output 6758 ml   Net -3690.3 ml       Physical Exam   Constitutional: He is oriented to person, place, and time. He appears well-developed.   jaundiced   HENT:   Head: Normocephalic and atraumatic.   Eyes: Scleral icterus is present.   Cardiovascular: Normal rate, regular rhythm and intact distal pulses.   Pulmonary/Chest: Effort normal. No respiratory distress.   Abdominal: Soft. He exhibits no distension.   Incision with dried blood, c/d/i  Dressing over former site of right drain; ostomy bag over former left sided drain sites,  collecting SS output   Musculoskeletal: He exhibits edema.   Neurological: He is alert and oriented to person, place, and time.   Skin: Skin is warm and dry.   Jaundice improving       Lines/Drains/Airways     Central Venous Catheter Line                 Percutaneous Central Line Insertion/Assessment - double lumen  right internal jugular -- days         Tunneled Central Line Insertion/Assessment - Double Lumen  11/07/18 1350 right internal jugular 8 days          Drain                 Urethral Catheter 11/11/18 0246 Straight-tip;Non-latex 16 Fr. 5 days          Arterial Line                 Arterial Line 11/11/18 0159 Left Radial 5 days                Significant Labs:    CBC/Anemia Profile:  Recent Labs   Lab 11/15/18  1509 11/15/18  2132 11/16/18  0320   WBC 8.57  8.57 14.63* 9.64   HGB 7.6*  7.6* 8.5* 8.1*   HCT 21.7*  21.7* 25.1* 24.1*   PLT 43*  43* 46* 42*   MCV 88  88 92 92   RDW 14.8*  14.8* 14.8* 15.3*        Chemistries:  Recent Labs   Lab 11/15/18  0512 11/15/18  1509 11/15/18  2132 11/16/18  0320     137  137 138  138 138  138 138  138   K 4.6  4.6  4.6 4.7  4.7 4.5  4.5 4.5  4.5     103  103 106  106 106  106 105  105   CO2 25  25  25 27  27 26  26 26  26   BUN 18  18  18 8  8 7  7 7  7   CREATININE 1.4  1.4  1.4 0.8  0.8 0.7  0.7 0.7  0.7   CALCIUM 8.0*  8.0*  8.0* 7.6*  7.6* 7.6*  7.6* 7.8*  7.8*   ALBUMIN 2.4*  2.4*  2.4* 2.2*  2.2* 2.2*  2.2* 2.2*  2.2*   PROT 4.2*  --  4.2* 4.2*   BILITOT 12.1*  --  13.7* 14.6*   ALKPHOS 201*  --  333* 321*   *  --  269* 248*   *  --  132* 110*   MG 2.0  2.0 1.8  1.8 1.7 1.8   PHOS 2.6*  2.6* 2.1*  2.1* 1.6* 1.4*       Significant Imaging:  I have reviewed and interpreted all pertinent imaging results/findings within the past 24 hours.    Assessment/Plan:     * S/P liver transplant    S/P liver transplant     52 y/o man with ESLD, ESRD and DM2 now s/p liver transplant     Neuro:   -  Sedation: none  - Pain control: PRN fentanyl     Pulmonary:   - RA; O2 as required  - daily cxr  - duonebs prn     Cardiac:  - Drips: none  - HDS     Renal:   -CRRT overnight  -trend BMP  - BUN/Cr: 13/1.5 -> 35/2.7-> 31/2.1-->7/0.7  - Nephrology on board, appreciate recommendations     Infectious Disease:   - AF  - Trend WBC - 12 -> 13->12->9.6  - Abx: completed course of Cipro (UCx 11/11 grew Enterococcus)  - Prophylaxis per transplant - Valcyte, Diflucan  - IS per Transplant - MMF, Prograf     Hematology/Oncology:  - H&H 7.4/20.8 -> 8.0/23 after 2U pRBCs -> 6.0 today; transfuse 2U pRBCs; h/h 8.1/23.9 s/p 2 units pRBC; 8.1/24.1   - Plt 84 -> 59 -> 50 -> 63 (after 1 U Plt) -> 52->51->42  - INR 1.2  - Trend CBC     Endocrine:  -  on AM labs  - SSI   - Endocrinology on board, appreciate recommendations     Fluids/Electrolytes/Nutrition/GI:   - renal diet  - Trend CMP  - Replace Lytes PRN  - liver US 11/12 - 6.6 cm complex fluid collection deep to left lobe; increased flow velocity in main hepatic artery      Prophylaxis:  - SQH  - Famotidine     Dispo:  Continue ICU care - possibly stepdown pending Nephrology recs for CRRT requirements  Primary team:Transplant              Critical care was time spent personally by me on the following activities: development of treatment plan with patient or surrogate and bedside caregivers, discussions with consultants, evaluation of patient's response to treatment, examination of patient, ordering and performing treatments and interventions, ordering and review of laboratory studies, ordering and review of radiographic studies, pulse oximetry, re-evaluation of patient's condition.  This critical care time did not overlap with that of any other provider or involve time for any procedures.     Adolph Pavon MD  Critical Care - Surgery  [facility]

## 2018-11-16 NOTE — PT/OT/SLP DISCHARGE
Physical Therapy Discharge Summary    Name: Femi Enciso  MRN: [id number]   Principal Problem: S/P liver transplant     Patient Discharged from acute Physical Therapy on 18 to surgery for exp lap. .  Please refer to prior PT noted date on 18 for functional status.     Assessment:     Patient has not met goals.    Objective:     GOALS:   Multidisciplinary Problems     Physical Therapy Goals        Problem: Physical Therapy Goal    Goal Priority Disciplines Outcome Goal Variances Interventions   Physical Therapy Goal     PT, PT/OT Ongoing (interventions implemented as appropriate)     Description:  Goals to be met by: 2018     Patient will increase functional independence with mobility by performin. Supine to sit with Modified [place]  2. Sit to stand transfer with [place]  3. Bed to chair transfer with independence using no AD  4. Gait  x 50 feet with Supervision using LRAD.   5. Lower extremity exercise program x20 reps per handout, with independence                       Reasons for Discontinuation of Therapy Services  surgery for exp lap      Plan:     Patient Discharged to: new orders needed 2nd to surgery for exp lap 18  Therapist of record not available to write discharge summary..    Nataliya Rushing, PT  2018

## 2018-11-16 NOTE — PROGRESS NOTES
[facility]  Liver Transplant  Progress Note    Patient Name: Femi Enciso  MRN: [id number]  Admission Date: 10/27/2018  Hospital Length of Stay: 20 days  Code Status: Full Code  Primary Care Provider: Primary Doctor No  Post-Op Day Day of Surgery      ORGAN:   LIVER  Disease Etiology: Alcoholic Cirrhosis  Donor Type:    - Brain Death  CDC High Risk:   No  Donor CMV Status:   Donor CMV Status: Positive  Donor HBcAB:   Negative  Donor HCV Status:   Negative  Donor HBV JAVIER: Negative  Donor HCV JAVIER: Negative  Whole or Partial: Whole Liver  Biliary Anastomosis: End to End  Arterial Anatomy: Standard    Subjective:   NAEON. More awake and oriented this AM     Scheduled Meds:   albumin human 5%        albumin human 5%  12.5 g Intravenous Once    famotidine (PF)  20 mg Intravenous Daily    fluconazole (DIFLUCAN) IVPB  200 mg Intravenous Q24H    heparin (porcine)  5,000 Units Subcutaneous Q8H    methylPREDNISolone sodium succinate  20 mg Intravenous Q12H    Followed by    [START ON 2018] predniSONE  20 mg Oral Daily    mycophenolate (CELLCEPT) IVPB  1,000 mg Intravenous Daily    norepinephrine bitartrate-D5W        norepinephrine bitartrate-D5W        tacrolimus  1 mg Oral BID    [START ON 2018] valganciclovir 50 mg/ml  450 mg Per NG tube Daily     Continuous Infusions:   sodium chloride 0.9% 200 mL/hr at 18 0700     PRN Meds:sodium chloride, sodium chloride, ciprofloxacin (CIPRO)400mg/200ml D5W IVPB, dextrose 50%, glucagon (human recombinant), insulin aspart U-100, linezolid 600mg/300ml, magnesium sulfate IVPB, oxyCODONE, oxyCODONE, ramelteon    Review of Systems  Objective:     Vital Signs (Most Recent):  Temp: (P) 97.8 °F (36.6 °C) (18 1300)  Pulse: (!) 113 (18 1245)  Resp: (!) 29 (18 1245)  BP: 130/78 (18 1230)  SpO2: 98 % (18 1245) Vital Signs (24h Range):  Temp:  [97.7 °F (36.5 °C)-98.6 °F (37 °C)] (P) 97.8 °F (36.6 °C)  Pulse:   [] 113  Resp:  [11-39] 29  SpO2:  [95 %-100 %] 98 %  BP: (103-134)/(63-81) 130/78  Arterial Line BP: (121-161)/(62-81) 121/70     Weight: 98.1 kg (216 lb 4.3 oz)  Body mass index is 31.03 kg/m².    Intake/Output - Last 3 Shifts       11/14 0700 - 11/15 0659 11/15 0700 - 11/16 0659 11/16 0700 - 11/17 0659    P.O.       I.V. (mL/kg) 1113.9 (11.4) 4978.7 (50.8) 4264.7 (43.5)    Blood 217 257 517    IV Piggyback 250      Total Intake(mL/kg) 1580.9 (16.1) 5235.7 (53.4) 4781.7 (48.7)    Urine (mL/kg/hr) 0 (0) 10 (0)     Drains 810  10    Other 3602 7196 797    Stool  0     Blood   100    Total Output 4412 7206 907    Net -2831.1 -1970.3 +3874.7           Stool Occurrence  1 x           Physical Exam   Constitutional: He is oriented to person, place, and time. He appears well-developed.   jaundiced   HENT:   Head: Normocephalic and atraumatic.   Eyes: Scleral icterus is present.   Cardiovascular: Normal rate, regular rhythm and intact distal pulses.   Pulmonary/Chest: Effort normal. No respiratory distress.   Abdominal: Soft. He exhibits no distension.   Incision with dried blood, c/d/i  Dressing over former site of right drain; ostomy bag over former left sided drain sites, collecting SS output   Musculoskeletal: He exhibits edema.   Neurological: He is alert and oriented to person, place, and time.   Skin: Skin is warm and dry.   Jaundice improving       Laboratory:  Immunosuppressants         Stop Route Frequency     tacrolimus capsule 1 mg      -- Oral 2 times daily     mycophenolate (CELLCEPT) 1,000 mg in dextrose 5 % 250 mL IVPB      -- IV Daily        Labs within the past 24 hours have been reviewed.    Diagnostic Results:  I have personally reviewed all pertinent imaging studies.    Assessment/Plan:   Femi Enciso is a 53 y.o. 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 POD#4    Neuro  -acute change in mental status with confusion and agitation this  11/14  -controlled with PRN ativan   -monitor neuro exam today. Much more awake and alert today     CV  -off pressor support   -swan tricia catheter has been removed   -monitor on telemetry     Resp  -extubated yesterday w/o issues   -continue to monitor O2 sats     Heme  -H/H dropped to 8.1 following 1u pRBC yesterday   -Will schedule for OR today for washout of hematoma around the liver   -monitor H/H post-op     ID  -monitor fever and WBC   -f/u cultures     MSK  -OOBTC  -ambulation as tolerated      FEN/GI  -replace lytes   -Continue NPO until OR today      Endocrine  -insulin as needed. Monitor BG       -CRRT per renal     dispo  -continue ICU care.       The patients clinical status was discussed at multidisplinary rounds, involving transplant surgery, transplant medicine, pharmacy, nursing, nutrition, and social work.    Nadia Michael MD  Liver Transplant  [facility]

## 2018-11-16 NOTE — PROGRESS NOTES
Pt transferred from OR to [facility] via bed with CRNA on portable monitor and 100% O2 via face mask. RLQ JOSE A drain noted. Chevron incision dressing remains clean, dry, intact. No gtts infusing. Vitals stable.

## 2018-11-16 NOTE — ANESTHESIA PREPROCEDURE EVALUATION
[facility]  Anesthesia Pre-Operative Evaluation         Patient Name: Femi Enciso  YOB: 1965  MRN: [id number]    SUBJECTIVE:     Pre-operative evaluation for Procedure(s) (LRB):  LAPAROTOMY, EXPLORATORY, AFTER LIVER TRANSPLANT (N/A)     11/16/2018    Femi Enciso is a 53 y.o. male w/ a significant PMHx of DM2, ESLD secondary to EtOH abuse in 09/2018, DEL progressing to ESRD. Recent hospitalization for ALI in [place], while he also got hypoxic, CT chest showed large pleural effusion s/p thoracentesis (negative for malignancy) and he was treated for PNA and C.diff as well. Presented to [facility] from [place] for liver OTHx evaluation. S/p Liver Tx 11/11. Patient has been receiving CRRT effectively, mental status improving. However patient's Hgb continues to drop and has required multiple units of blood. Concern for continued intra-abdominal bleeding.     Last NPO: Liquids and solids over 24hrs prior.  Access: Right IJ CVC, and Right subclavian trialysis. No PIV. A-line left wrist.     Patient now presents for the above procedure(s).    LDA:        Percutaneous Central Line Insertion/Assessment - double lumen  right internal jugular (Active)   Dressing biopatch in place 11/16/2018  3:00 AM   Securement secured w/ sutures 11/16/2018  3:00 AM   Additional Site Signs no erythema;no warmth;no edema;no pain;no palpable cord;no streak formation;no drainage 11/16/2018  3:00 AM   Distal Patency/Care infusing 11/16/2018  3:00 AM   Proximal Patency/Care infusing 11/16/2018  3:00 AM   Waveform normal 11/16/2018  3:00 AM   Line Interventions line leveled/zeroed 11/16/2018  3:00 AM   Dressing Change Due 11/19/18 11/15/2018  3:15 PM   Daily Line Review Performed 11/16/2018  3:00 AM   Number of days:             Tunneled Central Line Insertion/Assessment - Double Lumen  11/07/18 1350 right internal jugular (Active)   Dressing biopatch in place;dressing dry and intact;dressing reinforced 11/16/2018  3:00 AM   IV  "Device Securement sutures 11/16/2018  3:00 AM   Additional Site Signs no erythema;no warmth;no edema;no pain;no palpable cord;no streak formation;no drainage 11/16/2018  3:00 AM   Distal Patency/Care infusing 11/16/2018  3:00 AM   Proximal Patency/Care infusing 11/16/2018  3:00 AM   Waveform normal 11/16/2018  3:00 AM   Line Interventions line leveled/zeroed 11/15/2018  3:15 PM   Dressing Change Due 11/19/18 11/15/2018  3:15 PM   Daily Line Review Performed 11/16/2018  3:00 AM   Number of days: 8            Arterial Line 11/11/18 0159 Left Radial (Active)   Site Assessment Clean;Dry;Intact;No redness;No swelling;No warmth;No drainage 11/16/2018  3:00 AM   Line Status Pulsatile blood flow 11/16/2018  3:00 AM   Art Line Waveform Appropriate 11/16/2018  3:00 AM   Arterial Line Interventions Zeroed and calibrated 11/16/2018  3:00 AM   Color/Movement/Sensation Capillary refill less than 3 sec 11/16/2018  3:00 AM   Dressing Type Transparent 11/16/2018  3:00 AM   Dressing Status Old drainage 11/16/2018  3:00 AM   Dressing Intervention Dressing reinforced 11/16/2018  3:00 AM   Dressing Change Due 11/19/18 11/16/2018  3:00 AM   Number of days: 5            Urethral Catheter 11/11/18 0246 Straight-tip;Non-latex 16 Fr. (Active)   Site Assessment Clean;Intact 11/16/2018  3:00 AM   Collection Container Urimeter 11/16/2018  3:00 AM   Securement Method secured to top of thigh w/ adhesive device 11/16/2018  3:00 AM   Catheter Care Performed yes 11/16/2018  3:00 AM   Reason for Continuing Urinary Catheterization Critically ill in ICU requiring intensive monitoring 11/16/2018  3:00 AM   CAUTI Prevention Bundle StatLock in place w 1" slack;Intact seal between catheter & drainage tubing;Drainage bag off the floor;No dependent loops or kinks;Green sheeting clip in use;Drainage bag not overfilled (<2/3 full);Drainage bag below bladder 11/15/2018  7:00 PM   Output (mL) 0 mL 11/16/2018  3:00 AM   Number of days: 5       Prev airway: Easy " mask. Grade I view on DL, placed without complication.     Drips:    sodium chloride 0.9% 200 mL/hr at 11/16/18 0700       Patient Active Problem List   Diagnosis    Acute liver failure    Jaundice    Acute renal failure    DEL (acute kidney injury)    ATN (acute tubular necrosis)    Severe alcohol dependence    Coagulopathy    Anemia of chronic disease    Thrombocytopenia    Hyponatremia    Metabolic acidosis    Moderate protein malnutrition    Type 2 diabetes mellitus without complication    Acute cystitis without hematuria    Pleural effusion associated with hepatic disorder    Decompensated hepatic cirrhosis    Alcohol use disorder, severe, in early remission    Pre-transplant evaluation for liver transplant    Encounter for pre-transplant evaluation for kidney transplant    Acute maculopapular rash    Weakness    S/P liver transplant    Prophylactic immunotherapy    Adrenal cortical steroids causing adverse effect in therapeutic use    Acute hyperglycemia    Encounter for weaning from ventilator    Overweight (BMI 25.0-29.9)    Delirium    Anasarca       Review of patient's allergies indicates:   Allergen Reactions    Cefepime Rash    Penicillins Nausea And Vomiting and Rash     Full body rash from cefepime as well       Current Inpatient Medications:   famotidine (PF)  20 mg Intravenous Daily    fluconazole (DIFLUCAN) IVPB  200 mg Intravenous Q24H    heparin (porcine)  5,000 Units Subcutaneous Q8H    methylPREDNISolone sodium succinate  20 mg Intravenous Q12H    Followed by    [START ON 11/17/2018] predniSONE  20 mg Oral Daily    mycophenolate (CELLCEPT) IVPB  1,000 mg Intravenous Daily    potassium phosphate IVPB  30 mmol Intravenous Once    [START ON 11/21/2018] valganciclovir 50 mg/ml  450 mg Per NG tube Daily       No current facility-administered medications on file prior to encounter.      Current Outpatient Medications on File Prior to Encounter   Medication Sig  Dispense Refill    calcium carbonate/vitamin D3 (VITAMIN D-3 ORAL) Take 1 tablet by mouth once daily.      cyanocobalamin (VITAMIN B-12) 100 MCG tablet Take 100 mcg by mouth once daily.      folic acid (FOLVITE) 1 MG tablet Take 1 mg by mouth once daily.      lactulose (CHRONULAC) 10 gram/15 mL solution Take 20 g by mouth 3 (three) times daily.      multivitamin (THERAGRAN) per tablet Take 1 tablet by mouth once daily.      omeprazole (PRILOSEC) 40 MG capsule Take 40 mg by mouth once daily.      ondansetron (ZOFRAN) 4 MG tablet Take 4 mg by mouth daily as needed for Nausea.      rifAXIMin (XIFAXAN) 550 mg Tab Take 550 mg by mouth 2 (two) times daily.         Past Surgical History:   Procedure Laterality Date    EGD (ESOPHAGOGASTRODUODENOSCOPY) N/A 11/6/2018    Performed by Duarte Jules MD at [facility] ([address] [place] [address])    ESOPHAGOGASTRODUODENOSCOPY N/A 11/6/2018    Procedure: EGD (ESOPHAGOGASTRODUODENOSCOPY);  Surgeon: Duarte Jules MD;  Location: [facility] ([address] [place] [address]);  Service: Endoscopy;  Laterality: N/A;    INSERTION OF TUNNELED CENTRAL VENOUS HEMODIALYSIS CATHETER N/A 11/7/2018    Procedure: INSERTION, CATHETER, CENTRAL VENOUS, HEMODIALYSIS, TUNNELED;  Surgeon: Brock Gonzalez MD;  Location: [facility] CATH LAB;  Service: Nephrology;  Laterality: N/A;    INSERTION, CATHETER, CENTRAL VENOUS, HEMODIALYSIS, TUNNELED N/A 11/7/2018    Performed by Brock Gonzalez MD at [facility] CATH LAB    LIVER TRANSPLANT N/A 11/11/2018    Procedure: TRANSPLANT, LIVER;  Surgeon: Shmuel Leary MD;  Location: [facility] OR [address] [place] [address];  Service: Transplant;  Laterality: N/A;    TRANSPLANT, LIVER N/A 11/11/2018    Performed by Shmuel Leary MD at [facility] OR [address] [place] [address]       Social History     Socioeconomic History    Marital status:      Spouse name: Not on file    Number of children: Not on file    Years of education: Not on file    Highest education level: Not on file   Social Needs    Financial resource strain:  Not on file    Food insecurity - worry: Not on file    Food insecurity - inability: Not on file    Transportation needs - medical: Not on file    Transportation needs - non-medical: Not on file   Occupational History    Not on file   Tobacco Use    Smoking status: Never Smoker   Substance and Sexual Activity    Alcohol use: Not on file    Drug use: Not on file    Sexual activity: Not on file   Other Topics Concern    Not on file   Social History Narrative    Not on file       OBJECTIVE:     Vital Signs Range (Last 24H):  Temp:  [36.5 °C (97.7 °F)-37 °C (98.6 °F)]   Pulse:  []   Resp:  [11-30]   BP: (103-134)/(63-83)   SpO2:  [95 %-100 %]   Arterial Line BP: (124-161)/(62-81)       Significant Labs:  Lab Results   Component Value Date    WBC 9.64 11/16/2018    HGB 8.1 (L) 11/16/2018    HCT 24.1 (L) 11/16/2018    PLT 42 (L) 11/16/2018     (H) 11/16/2018     (H) 11/16/2018     11/16/2018     11/16/2018    K 4.5 11/16/2018    K 4.5 11/16/2018     11/16/2018     11/16/2018    CREATININE 0.7 11/16/2018    CREATININE 0.7 11/16/2018    BUN 7 11/16/2018    BUN 7 11/16/2018    CO2 26 11/16/2018    CO2 26 11/16/2018    PSA 0.39 11/02/2018    INR 1.1 11/16/2018    HGBA1C 4.4 11/09/2018       Diagnostic Studies:   US Liver  Interval development of two large complex collections adjacent to the right hepatic lobe and within the retroperitoneum adjacent to the right kidney respectively.  Findings concerning for hematomas in light of given clinical history.    Grossly stable elevated velocity within the main hepatic artery likely secondary to anatomic tortuosity.  Otherwise unremarkable vascular appearance of the liver allograft.    EKG  Vent. Rate : 080 BPM     Atrial Rate : 080 BPM     P-R Int : 134 ms          QRS Dur : 116 ms      QT Int : 424 ms       P-R-T Axes : 030 -04 021 degrees     QTc Int : 489 ms    Normal sinus rhythm  Prolonged QT  Abnormal ECG  When compared with  ECG of 09-NOV-2018 13:04,  No significant change was found  Confirmed by MIKO LUCERO MD ([id number]) on 11/11/2018 10:51:09 AM    2D ECHO:  Results for orders placed or performed during the hospital encounter of 10/27/18   2D echo with color flow doppler   Result Value Ref Range    QEF 73 55 - 65    Diastolic Dysfunction No     Est. PA Systolic Pressure 32.16     Tricuspid Valve Regurgitation TRIVIAL          ASSESSMENT/PLAN:       Anesthesia Evaluation    I have reviewed the Patient Summary Reports.    I have reviewed the Nursing Notes.   I have reviewed the Medications.     Review of Systems  Anesthesia Hx:  No previous Anesthesia  Neg history of prior surgery.  Denies Personal Hx of Anesthesia complications.   Social:  Non-Smoker, Alcohol Use    Hematology/Oncology:         -- Anemia:   Cardiovascular:   Hypertension hyperlipidemia ECG has been reviewed.    Pulmonary:   Shortness of breath (2/2 plueral effusion (drained)) Has pleural effusion   Renal/:   Chronic Renal Disease, Dialysis    Hepatic/GI:   Liver Disease, Hepatitis    Endocrine:   Diabetes    Psych:   Psychiatric History          Physical Exam  General:  Well nourished    Airway/Jaw/Neck:  Airway Findings: Mouth Opening: Normal Tongue: Normal  Mallampati: I  Improves to I with phonation.  TM Distance: Normal, at least 6 cm      Dental:  Dental Findings: In tact   Chest/Lungs:  Chest/Lungs Findings: Clear to auscultation, Normal Respiratory Rate     Heart/Vascular:  Heart Findings: Rate: Normal  Rhythm: Regular Rhythm  Sounds: Normal     Abdomen:  Abdomen Findings: Normal    Musculoskeletal:  Musculoskeletal Findings: Normal   Skin:  Skin Findings: Normal    Mental Status:  Mental Status Findings:  Cooperative, Alert and Oriented         Anesthesia Plan  Type of Anesthesia, risks & benefits discussed:  Anesthesia Type:  general  Patient's Preference:   Intra-op Monitoring Plan: standard ASA monitors, arterial line and central line  Intra-op Monitoring Plan  Comments:   Post Op Pain Control Plan: multimodal analgesia, IV/PO Opioids PRN and per primary service following discharge from PACU  Post Op Pain Control Plan Comments:   Induction:   IV  Beta Blocker:         Informed Consent: Patient representative understands risks and agrees with Anesthesia plan.  Questions answered. Anesthesia consent signed with patient representative.  ASA Score: 4  emergent   Day of Surgery Review of History & Physical:    H&P update referred to the surgeon.         Ready For Surgery From Anesthesia Perspective.

## 2018-11-16 NOTE — TRANSFER OF CARE
"Anesthesia Transfer of Care Note    Patient: Femi Enciso    Procedure(s) Performed: Procedure(s) (LRB):  LAPAROTOMY, EXPLORATORY, AFTER LIVER TRANSPLANT (N/A)    Patient location: PACU    Anesthesia Type: general    Transport from OR: Transported from OR on 6-10 L/min O2 by face mask with adequate spontaneous ventilation. Continuous SpO2 monitoring in transport. Continuous ECG monitoring in transport. Continuos invasive BP monitoring in transport    Post pain: adequate analgesia    Post assessment: no apparent anesthetic complications    Post vital signs: stable    Level of consciousness: awake    Nausea/Vomiting: no nausea/vomiting    Complications: none          Last vitals:   Visit Vitals  /81 (BP Location: Left arm, Patient Position: Lying)   Pulse 95   Temp 36.5 °C (97.7 °F) (Oral)   Resp (!) 23   Ht 5' 10" (1.778 m)   Wt 98.1 kg (216 lb 4.3 oz)   SpO2 99%   BMI 31.03 kg/m²     "

## 2018-11-16 NOTE — SUBJECTIVE & OBJECTIVE
Interval History/Significant Events: On CRRT this AM. HDS. Pt's mental status imroved from previous. Alert to person and place this AM.     Follow-up For: Procedure(s) (LRB):  TRANSPLANT, LIVER (N/A)    Post-Operative Day: 5 Days Post-Op    Objective:     Vital Signs (Most Recent):  Temp: 97.8 °F (36.6 °C) (11/16/18 0800)  Pulse: 91 (11/16/18 0800)  Resp: (!) 24 (11/16/18 0800)  BP: 126/77 (11/16/18 0800)  SpO2: 99 % (11/16/18 0800) Vital Signs (24h Range):  Temp:  [97.8 °F (36.6 °C)-98.6 °F (37 °C)] 97.8 °F (36.6 °C)  Pulse:  [] 91  Resp:  [11-30] 24  SpO2:  [95 %-100 %] 99 %  BP: (103-129)/(63-83) 126/77  Arterial Line BP: (124-159)/(62-81) 154/76     Weight: 98.1 kg (216 lb 4.3 oz)  Body mass index is 31.03 kg/m².      Intake/Output Summary (Last 24 hours) at 11/16/2018 0820  Last data filed at 11/16/2018 0800  Gross per 24 hour   Intake 3067.7 ml   Output 6758 ml   Net -3690.3 ml       Physical Exam   Constitutional: He is oriented to person, place, and time. He appears well-developed.   jaundiced   HENT:   Head: Normocephalic and atraumatic.   Eyes: Scleral icterus is present.   Cardiovascular: Normal rate, regular rhythm and intact distal pulses.   Pulmonary/Chest: Effort normal. No respiratory distress.   Abdominal: Soft. He exhibits no distension.   Incision with dried blood, c/d/i  Dressing over former site of right drain; ostomy bag over former left sided drain sites, collecting SS output   Musculoskeletal: He exhibits edema.   Neurological: He is alert and oriented to person, place, and time.   Skin: Skin is warm and dry.   Jaundice improving       Lines/Drains/Airways     Central Venous Catheter Line                 Percutaneous Central Line Insertion/Assessment - double lumen  right internal jugular -- days         Tunneled Central Line Insertion/Assessment - Double Lumen  11/07/18 1350 right internal jugular 8 days          Drain                 Urethral Catheter 11/11/18 7651  Straight-tip;Non-latex 16 Fr. 5 days          Arterial Line                 Arterial Line 11/11/18 0159 Left Radial 5 days                Significant Labs:    CBC/Anemia Profile:  Recent Labs   Lab 11/15/18  1509 11/15/18  2132 11/16/18  0320   WBC 8.57  8.57 14.63* 9.64   HGB 7.6*  7.6* 8.5* 8.1*   HCT 21.7*  21.7* 25.1* 24.1*   PLT 43*  43* 46* 42*   MCV 88  88 92 92   RDW 14.8*  14.8* 14.8* 15.3*        Chemistries:  Recent Labs   Lab 11/15/18  0512 11/15/18  1509 11/15/18  2132 11/16/18  0320     137  137 138  138 138  138 138  138   K 4.6  4.6  4.6 4.7  4.7 4.5  4.5 4.5  4.5     103  103 106  106 106  106 105  105   CO2 25  25  25 27  27 26  26 26  26   BUN 18 18  18 8  8 7  7 7  7   CREATININE 1.4  1.4  1.4 0.8  0.8 0.7  0.7 0.7  0.7   CALCIUM 8.0*  8.0*  8.0* 7.6*  7.6* 7.6*  7.6* 7.8*  7.8*   ALBUMIN 2.4*  2.4*  2.4* 2.2*  2.2* 2.2*  2.2* 2.2*  2.2*   PROT 4.2*  --  4.2* 4.2*   BILITOT 12.1*  --  13.7* 14.6*   ALKPHOS 201*  --  333* 321*   *  --  269* 248*   *  --  132* 110*   MG 2.0  2.0 1.8  1.8 1.7 1.8   PHOS 2.6*  2.6* 2.1*  2.1* 1.6* 1.4*       Significant Imaging:  I have reviewed and interpreted all pertinent imaging results/findings within the past 24 hours.

## 2018-11-16 NOTE — BRIEF OP NOTE
Operation: Exploratory laparotomy post liver transplant and liver needle biopsy    Surgeon: Dr Amadou Lester  Asst: Dr Taras Wylie, Dr Nadia Michael    Anesthesia: GA, Dr Andre    Procedure: Previous incision opened.  Good hepatic artery pulsations, portal/IVC patent with no apparent bile leak. Clots evacuated from the  Right sub hepatic space, active bleeding seen from the  Right adrenal bed. 4-0 prolene stitch applied. More diffuse oozing from right adrenal and fascia of gerota on right side. Surgiflo and fibrillar packing done. Minimal ooze seen.  Liver biopsy taken from right lobe.    Drain 19 F placed in right subhepatic area.    Closure: Fascia in 2 layers 0 PDS  Skin: staples    Instrument and sponge count correct at time of closure    Patient moved to ICU in stable condition.      Taras Wylie MD  Fellow Transplant surgery

## 2018-11-16 NOTE — PROGRESS NOTES
Pt transferred to 2nd floor surgery via bed on portable monitor with GISELL Cabello and PCT. 100% O2 on room air. Vitals stable at this time. Chart sent with pt. Pt oriented to self, time, situation. Hand off given to GIANCARLO Renteria CRNA.

## 2018-11-17 PROBLEM — D72.829 LEUCOCYTOSIS: Status: ACTIVE | Noted: 2018-11-17

## 2018-11-17 LAB
ALBUMIN SERPL BCP-MCNC: 2.6 G/DL
ALBUMIN SERPL BCP-MCNC: 2.7 G/DL
ALBUMIN SERPL BCP-MCNC: 2.7 G/DL
ALBUMIN SERPL BCP-MCNC: 2.8 G/DL
ALBUMIN SERPL BCP-MCNC: 2.9 G/DL
ALBUMIN SERPL BCP-MCNC: 2.9 G/DL
ALBUMIN SERPL BCP-MCNC: 3.1 G/DL
ALBUMIN SERPL BCP-MCNC: 3.3 G/DL
ALLENS TEST: ABNORMAL
ALP SERPL-CCNC: 112 U/L
ALP SERPL-CCNC: 119 U/L
ALP SERPL-CCNC: 122 U/L
ALP SERPL-CCNC: 129 U/L
ALP SERPL-CCNC: 130 U/L
ALP SERPL-CCNC: 134 U/L
ALT SERPL W/O P-5'-P-CCNC: 112 U/L
ALT SERPL W/O P-5'-P-CCNC: 113 U/L
ALT SERPL W/O P-5'-P-CCNC: 140 U/L
ALT SERPL W/O P-5'-P-CCNC: 152 U/L
ALT SERPL W/O P-5'-P-CCNC: 153 U/L
ALT SERPL W/O P-5'-P-CCNC: 96 U/L
ANION GAP SERPL CALC-SCNC: 11 MMOL/L
ANION GAP SERPL CALC-SCNC: 7 MMOL/L
ANION GAP SERPL CALC-SCNC: 9 MMOL/L
ANISOCYTOSIS BLD QL SMEAR: SLIGHT
APTT BLDCRRT: 30.1 SEC
AST SERPL-CCNC: 42 U/L
AST SERPL-CCNC: 53 U/L
AST SERPL-CCNC: 59 U/L
AST SERPL-CCNC: 79 U/L
AST SERPL-CCNC: 93 U/L
AST SERPL-CCNC: 96 U/L
BASOPHILS # BLD AUTO: 0.01 K/UL
BASOPHILS # BLD AUTO: 0.02 K/UL
BASOPHILS # BLD AUTO: 0.03 K/UL
BASOPHILS # BLD AUTO: 0.05 K/UL
BASOPHILS # BLD AUTO: 0.06 K/UL
BASOPHILS # BLD AUTO: 0.07 K/UL
BASOPHILS NFR BLD: 0.1 %
BASOPHILS NFR BLD: 0.1 %
BASOPHILS NFR BLD: 0.2 %
BASOPHILS NFR BLD: 0.3 %
BILIRUB DIRECT SERPL-MCNC: 11.3 MG/DL
BILIRUB SERPL-MCNC: 12.2 MG/DL
BILIRUB SERPL-MCNC: 12.3 MG/DL
BILIRUB SERPL-MCNC: 12.4 MG/DL
BILIRUB SERPL-MCNC: 13.7 MG/DL
BILIRUB SERPL-MCNC: 15 MG/DL
BILIRUB SERPL-MCNC: 15.1 MG/DL
BLD PROD TYP BPU: NORMAL
BLOOD UNIT EXPIRATION DATE: NORMAL
BLOOD UNIT TYPE CODE: 5100
BLOOD UNIT TYPE CODE: 5100
BLOOD UNIT TYPE CODE: 6200
BLOOD UNIT TYPE CODE: 9500
BLOOD UNIT TYPE: NORMAL
BUN SERPL-MCNC: 11 MG/DL
BUN SERPL-MCNC: 11 MG/DL
BUN SERPL-MCNC: 12 MG/DL
BUN SERPL-MCNC: 13 MG/DL
BUN SERPL-MCNC: 14 MG/DL
BUN SERPL-MCNC: 17 MG/DL
BUN SERPL-MCNC: 17 MG/DL
BUN SERPL-MCNC: 9 MG/DL
CALCIUM SERPL-MCNC: 7.7 MG/DL
CALCIUM SERPL-MCNC: 7.7 MG/DL
CALCIUM SERPL-MCNC: 7.8 MG/DL
CALCIUM SERPL-MCNC: 8 MG/DL
CALCIUM SERPL-MCNC: 8.1 MG/DL
CALCIUM SERPL-MCNC: 8.2 MG/DL
CALCIUM SERPL-MCNC: 8.3 MG/DL
CALCIUM SERPL-MCNC: 8.4 MG/DL
CHLORIDE SERPL-SCNC: 106 MMOL/L
CHLORIDE SERPL-SCNC: 106 MMOL/L
CHLORIDE SERPL-SCNC: 107 MMOL/L
CHLORIDE SERPL-SCNC: 108 MMOL/L
CHLORIDE SERPL-SCNC: 108 MMOL/L
CO2 SERPL-SCNC: 21 MMOL/L
CO2 SERPL-SCNC: 22 MMOL/L
CO2 SERPL-SCNC: 23 MMOL/L
CO2 SERPL-SCNC: 23 MMOL/L
CO2 SERPL-SCNC: 24 MMOL/L
CO2 SERPL-SCNC: 24 MMOL/L
CO2 SERPL-SCNC: 25 MMOL/L
CO2 SERPL-SCNC: 25 MMOL/L
CODING SYSTEM: NORMAL
CREAT SERPL-MCNC: 0.8 MG/DL
CREAT SERPL-MCNC: 1 MG/DL
CREAT SERPL-MCNC: 1 MG/DL
CREAT SERPL-MCNC: 1.2 MG/DL
CREAT SERPL-MCNC: 1.2 MG/DL
CREAT SERPL-MCNC: 1.3 MG/DL
CREAT SERPL-MCNC: 1.4 MG/DL
CREAT SERPL-MCNC: 1.5 MG/DL
CREAT SERPL-MCNC: 1.6 MG/DL
DELSYS: ABNORMAL
DIFFERENTIAL METHOD: ABNORMAL
DISPENSE STATUS: NORMAL
EOSINOPHIL # BLD AUTO: 0.3 K/UL
EOSINOPHIL # BLD AUTO: 0.3 K/UL
EOSINOPHIL # BLD AUTO: 0.5 K/UL
EOSINOPHIL # BLD AUTO: 1.4 K/UL
EOSINOPHIL # BLD AUTO: 1.4 K/UL
EOSINOPHIL # BLD AUTO: 3.2 K/UL
EOSINOPHIL NFR BLD: 12.8 %
EOSINOPHIL NFR BLD: 2.1 %
EOSINOPHIL NFR BLD: 2.5 %
EOSINOPHIL NFR BLD: 3.3 %
EOSINOPHIL NFR BLD: 4.8 %
EOSINOPHIL NFR BLD: 6.3 %
ERYTHROCYTE [DISTWIDTH] IN BLOOD BY AUTOMATED COUNT: 14.6 %
ERYTHROCYTE [DISTWIDTH] IN BLOOD BY AUTOMATED COUNT: 15.1 %
ERYTHROCYTE [DISTWIDTH] IN BLOOD BY AUTOMATED COUNT: 15.5 %
ERYTHROCYTE [DISTWIDTH] IN BLOOD BY AUTOMATED COUNT: 15.5 %
ERYTHROCYTE [DISTWIDTH] IN BLOOD BY AUTOMATED COUNT: 15.7 %
ERYTHROCYTE [DISTWIDTH] IN BLOOD BY AUTOMATED COUNT: 15.7 %
ERYTHROCYTE [SEDIMENTATION RATE] IN BLOOD BY WESTERGREN METHOD: 16 MM/H
EST. GFR  (AFRICAN AMERICAN): 56 ML/MIN/1.73 M^2
EST. GFR  (AFRICAN AMERICAN): >60 ML/MIN/1.73 M^2
EST. GFR  (NON AFRICAN AMERICAN): 48.5 ML/MIN/1.73 M^2
EST. GFR  (NON AFRICAN AMERICAN): 52.4 ML/MIN/1.73 M^2
EST. GFR  (NON AFRICAN AMERICAN): 57 ML/MIN/1.73 M^2
EST. GFR  (NON AFRICAN AMERICAN): >60 ML/MIN/1.73 M^2
FIBRINOGEN PPP-MCNC: 242 MG/DL
FIO2: 40
FIO2: 40
FIO2: 60
GGT SERPL-CCNC: 322 U/L
GLUCOSE SERPL-MCNC: 146 MG/DL
GLUCOSE SERPL-MCNC: 150 MG/DL
GLUCOSE SERPL-MCNC: 150 MG/DL
GLUCOSE SERPL-MCNC: 175 MG/DL
GLUCOSE SERPL-MCNC: 177 MG/DL
GLUCOSE SERPL-MCNC: 188 MG/DL
GLUCOSE SERPL-MCNC: 197 MG/DL
GLUCOSE SERPL-MCNC: 211 MG/DL
GLUCOSE SERPL-MCNC: 234 MG/DL
HCO3 UR-SCNC: 25.7 MMOL/L (ref 24–28)
HCO3 UR-SCNC: 27.1 MMOL/L (ref 24–28)
HCO3 UR-SCNC: 27.4 MMOL/L (ref 24–28)
HCT VFR BLD AUTO: 23.1 %
HCT VFR BLD AUTO: 26.1 %
HCT VFR BLD AUTO: 27.2 %
HCT VFR BLD AUTO: 27.4 %
HCT VFR BLD AUTO: 29.4 %
HCT VFR BLD AUTO: 29.5 %
HGB BLD-MCNC: 10.2 G/DL
HGB BLD-MCNC: 7.7 G/DL
HGB BLD-MCNC: 8.5 G/DL
HGB BLD-MCNC: 9.3 G/DL
HGB BLD-MCNC: 9.3 G/DL
HGB BLD-MCNC: 9.7 G/DL
HYPOCHROMIA BLD QL SMEAR: ABNORMAL
IMM GRANULOCYTES # BLD AUTO: 0.16 K/UL
IMM GRANULOCYTES # BLD AUTO: 0.23 K/UL
IMM GRANULOCYTES # BLD AUTO: 0.26 K/UL
IMM GRANULOCYTES # BLD AUTO: 0.59 K/UL
IMM GRANULOCYTES # BLD AUTO: 0.6 K/UL
IMM GRANULOCYTES # BLD AUTO: 0.69 K/UL
IMM GRANULOCYTES NFR BLD AUTO: 1.4 %
IMM GRANULOCYTES NFR BLD AUTO: 1.5 %
IMM GRANULOCYTES NFR BLD AUTO: 1.7 %
IMM GRANULOCYTES NFR BLD AUTO: 2.4 %
IMM GRANULOCYTES NFR BLD AUTO: 2.4 %
IMM GRANULOCYTES NFR BLD AUTO: 2.7 %
INR PPP: 1.3
INR PPP: 1.4
LYMPHOCYTES # BLD AUTO: 0.6 K/UL
LYMPHOCYTES # BLD AUTO: 1.1 K/UL
LYMPHOCYTES # BLD AUTO: 1.6 K/UL
LYMPHOCYTES # BLD AUTO: 2.9 K/UL
LYMPHOCYTES NFR BLD: 11.6 %
LYMPHOCYTES NFR BLD: 3.9 %
LYMPHOCYTES NFR BLD: 4 %
LYMPHOCYTES NFR BLD: 4.1 %
LYMPHOCYTES NFR BLD: 4.7 %
LYMPHOCYTES NFR BLD: 7.1 %
MAGNESIUM SERPL-MCNC: 1.7 MG/DL
MAGNESIUM SERPL-MCNC: 1.9 MG/DL
MAGNESIUM SERPL-MCNC: 2 MG/DL
MCH RBC QN AUTO: 29.3 PG
MCH RBC QN AUTO: 29.8 PG
MCH RBC QN AUTO: 30 PG
MCH RBC QN AUTO: 30 PG
MCH RBC QN AUTO: 30.3 PG
MCH RBC QN AUTO: 30.4 PG
MCHC RBC AUTO-ENTMCNC: 32.6 G/DL
MCHC RBC AUTO-ENTMCNC: 33 G/DL
MCHC RBC AUTO-ENTMCNC: 33.3 G/DL
MCHC RBC AUTO-ENTMCNC: 33.9 G/DL
MCHC RBC AUTO-ENTMCNC: 34.2 G/DL
MCHC RBC AUTO-ENTMCNC: 34.6 G/DL
MCV RBC AUTO: 88 FL
MCV RBC AUTO: 88 FL
MCV RBC AUTO: 89 FL
MCV RBC AUTO: 90 FL
MODE: ABNORMAL
MONOCYTES # BLD AUTO: 0.8 K/UL
MONOCYTES # BLD AUTO: 0.9 K/UL
MONOCYTES # BLD AUTO: 1.1 K/UL
MONOCYTES # BLD AUTO: 1.4 K/UL
MONOCYTES # BLD AUTO: 1.4 K/UL
MONOCYTES # BLD AUTO: 1.7 K/UL
MONOCYTES NFR BLD: 5.5 %
MONOCYTES NFR BLD: 6 %
MONOCYTES NFR BLD: 6.1 %
MONOCYTES NFR BLD: 6.3 %
MONOCYTES NFR BLD: 6.5 %
MONOCYTES NFR BLD: 7 %
NEUTROPHILS # BLD AUTO: 10 K/UL
NEUTROPHILS # BLD AUTO: 12.8 K/UL
NEUTROPHILS # BLD AUTO: 12.9 K/UL
NEUTROPHILS # BLD AUTO: 16.8 K/UL
NEUTROPHILS # BLD AUTO: 17.2 K/UL
NEUTROPHILS # BLD AUTO: 24.1 K/UL
NEUTROPHILS NFR BLD: 67.4 %
NEUTROPHILS NFR BLD: 77.4 %
NEUTROPHILS NFR BLD: 82.7 %
NEUTROPHILS NFR BLD: 84 %
NEUTROPHILS NFR BLD: 84.8 %
NEUTROPHILS NFR BLD: 85.9 %
NRBC BLD-RTO: 0 /100 WBC
NUM UNITS TRANS WBC-POOR PLATPHERESIS: NORMAL
OVALOCYTES BLD QL SMEAR: ABNORMAL
PCO2 BLDA: 39.9 MMHG (ref 35–45)
PCO2 BLDA: 44.2 MMHG (ref 35–45)
PCO2 BLDA: 53.4 MMHG (ref 35–45)
PEEP: 5
PEEP: 5
PEEP: 6
PH SMN: 7.32 [PH] (ref 7.35–7.45)
PH SMN: 7.37 [PH] (ref 7.35–7.45)
PH SMN: 7.44 [PH] (ref 7.35–7.45)
PHOSPHATE SERPL-MCNC: 2.4 MG/DL
PHOSPHATE SERPL-MCNC: 2.6 MG/DL
PHOSPHATE SERPL-MCNC: 2.7 MG/DL
PLATELET # BLD AUTO: 33 K/UL
PLATELET # BLD AUTO: 35 K/UL
PLATELET # BLD AUTO: 46 K/UL
PLATELET # BLD AUTO: 71 K/UL
PLATELET # BLD AUTO: 72 K/UL
PLATELET # BLD AUTO: 74 K/UL
PMV BLD AUTO: 11.2 FL
PMV BLD AUTO: 11.5 FL
PMV BLD AUTO: 11.8 FL
PMV BLD AUTO: 12.3 FL
PMV BLD AUTO: 12.3 FL
PMV BLD AUTO: 12.5 FL
PO2 BLDA: 109 MMHG (ref 80–100)
PO2 BLDA: 209 MMHG (ref 80–100)
PO2 BLDA: 61 MMHG (ref 80–100)
POC BE: 1 MMOL/L
POC BE: 1 MMOL/L
POC BE: 3 MMOL/L
POC SATURATED O2: 100 % (ref 95–100)
POC SATURATED O2: 89 % (ref 95–100)
POC SATURATED O2: 98 % (ref 95–100)
POC TCO2: 27 MMOL/L (ref 23–27)
POC TCO2: 28 MMOL/L (ref 23–27)
POC TCO2: 29 MMOL/L (ref 23–27)
POCT GLUCOSE: 147 MG/DL (ref 70–110)
POCT GLUCOSE: 151 MG/DL (ref 70–110)
POCT GLUCOSE: 189 MG/DL (ref 70–110)
POCT GLUCOSE: 219 MG/DL (ref 70–110)
POIKILOCYTOSIS BLD QL SMEAR: SLIGHT
POLYCHROMASIA BLD QL SMEAR: ABNORMAL
POTASSIUM SERPL-SCNC: 4.5 MMOL/L
POTASSIUM SERPL-SCNC: 4.7 MMOL/L
POTASSIUM SERPL-SCNC: 4.8 MMOL/L
POTASSIUM SERPL-SCNC: 4.9 MMOL/L
POTASSIUM SERPL-SCNC: 4.9 MMOL/L
POTASSIUM SERPL-SCNC: 5.1 MMOL/L
PROT SERPL-MCNC: 3.9 G/DL
PROT SERPL-MCNC: 3.9 G/DL
PROT SERPL-MCNC: 4 G/DL
PROT SERPL-MCNC: 4.1 G/DL
PROT SERPL-MCNC: 4.4 G/DL
PROT SERPL-MCNC: 4.4 G/DL
PROTHROMBIN TIME: 13.1 SEC
PROTHROMBIN TIME: 13.2 SEC
PROTHROMBIN TIME: 13.2 SEC
PROTHROMBIN TIME: 13.4 SEC
PROTHROMBIN TIME: 13.4 SEC
PROTHROMBIN TIME: 13.6 SEC
RBC # BLD AUTO: 2.57 M/UL
RBC # BLD AUTO: 2.9 M/UL
RBC # BLD AUTO: 3.06 M/UL
RBC # BLD AUTO: 3.1 M/UL
RBC # BLD AUTO: 3.26 M/UL
RBC # BLD AUTO: 3.37 M/UL
SAMPLE: ABNORMAL
SITE: ABNORMAL
SODIUM SERPL-SCNC: 138 MMOL/L
SODIUM SERPL-SCNC: 139 MMOL/L
SP02: 100
TACROLIMUS BLD-MCNC: 4 NG/ML
TRANS ERYTHROCYTES VOL PATIENT: NORMAL ML
TRANS ERYTHROCYTES VOL PATIENT: NORMAL ML
TRANS PLATPHERESIS VOL PATIENT: NORMAL ML
VT: 320
WBC # BLD AUTO: 11.73 K/UL
WBC # BLD AUTO: 15.05 K/UL
WBC # BLD AUTO: 15.23 K/UL
WBC # BLD AUTO: 22.25 K/UL
WBC # BLD AUTO: 24.89 K/UL
WBC # BLD AUTO: 29.12 K/UL

## 2018-11-17 PROCEDURE — 63600175 PHARM REV CODE 636 W HCPCS: Performed by: TRANSPLANT SURGERY

## 2018-11-17 PROCEDURE — 99900035 HC TECH TIME PER 15 MIN (STAT)

## 2018-11-17 PROCEDURE — 85610 PROTHROMBIN TIME: CPT

## 2018-11-17 PROCEDURE — 82248 BILIRUBIN DIRECT: CPT

## 2018-11-17 PROCEDURE — P9037 PLATE PHERES LEUKOREDU IRRAD: HCPCS

## 2018-11-17 PROCEDURE — 80069 RENAL FUNCTION PANEL: CPT

## 2018-11-17 PROCEDURE — 63600175 PHARM REV CODE 636 W HCPCS: Mod: JG | Performed by: STUDENT IN AN ORGANIZED HEALTH CARE EDUCATION/TRAINING PROGRAM

## 2018-11-17 PROCEDURE — 80100008 HC CRRT DAILY MAINTENANCE

## 2018-11-17 PROCEDURE — P9035 PLATELET PHERES LEUKOREDUCED: HCPCS

## 2018-11-17 PROCEDURE — 25000003 PHARM REV CODE 250: Performed by: TRANSPLANT SURGERY

## 2018-11-17 PROCEDURE — 63600175 PHARM REV CODE 636 W HCPCS: Performed by: NURSE PRACTITIONER

## 2018-11-17 PROCEDURE — P9017 PLASMA 1 DONOR FRZ W/IN 8 HR: HCPCS

## 2018-11-17 PROCEDURE — 85610 PROTHROMBIN TIME: CPT | Mod: 91

## 2018-11-17 PROCEDURE — 99233 SBSQ HOSP IP/OBS HIGH 50: CPT | Mod: 24,,, | Performed by: SURGERY

## 2018-11-17 PROCEDURE — 90945 DIALYSIS ONE EVALUATION: CPT

## 2018-11-17 PROCEDURE — P9045 ALBUMIN (HUMAN), 5%, 250 ML: HCPCS | Mod: JG | Performed by: STUDENT IN AN ORGANIZED HEALTH CARE EDUCATION/TRAINING PROGRAM

## 2018-11-17 PROCEDURE — 20000000 HC ICU ROOM

## 2018-11-17 PROCEDURE — 99900026 HC AIRWAY MAINTENANCE (STAT)

## 2018-11-17 PROCEDURE — 80053 COMPREHEN METABOLIC PANEL: CPT | Mod: 91

## 2018-11-17 PROCEDURE — 85025 COMPLETE CBC W/AUTO DIFF WBC: CPT | Mod: 91

## 2018-11-17 PROCEDURE — 99232 SBSQ HOSP IP/OBS MODERATE 35: CPT | Mod: ,,, | Performed by: NURSE PRACTITIONER

## 2018-11-17 PROCEDURE — S0028 INJECTION, FAMOTIDINE, 20 MG: HCPCS | Performed by: TRANSPLANT SURGERY

## 2018-11-17 PROCEDURE — 27000221 HC OXYGEN, UP TO 24 HOURS

## 2018-11-17 PROCEDURE — 25000003 PHARM REV CODE 250: Performed by: PEDIATRICS

## 2018-11-17 PROCEDURE — 85384 FIBRINOGEN ACTIVITY: CPT

## 2018-11-17 PROCEDURE — 99233 PR SUBSEQUENT HOSPITAL CARE,LEVL III: ICD-10-PCS | Mod: ,,, | Performed by: INTERNAL MEDICINE

## 2018-11-17 PROCEDURE — P9021 RED BLOOD CELLS UNIT: HCPCS

## 2018-11-17 PROCEDURE — 94761 N-INVAS EAR/PLS OXIMETRY MLT: CPT

## 2018-11-17 PROCEDURE — 86920 COMPATIBILITY TEST SPIN: CPT

## 2018-11-17 PROCEDURE — 99233 PR SUBSEQUENT HOSPITAL CARE,LEVL III: ICD-10-PCS | Mod: 24,,, | Performed by: SURGERY

## 2018-11-17 PROCEDURE — 80197 ASSAY OF TACROLIMUS: CPT

## 2018-11-17 PROCEDURE — 85730 THROMBOPLASTIN TIME PARTIAL: CPT

## 2018-11-17 PROCEDURE — P9047 ALBUMIN (HUMAN), 25%, 50ML: HCPCS | Mod: JG | Performed by: STUDENT IN AN ORGANIZED HEALTH CARE EDUCATION/TRAINING PROGRAM

## 2018-11-17 PROCEDURE — 82977 ASSAY OF GGT: CPT

## 2018-11-17 PROCEDURE — 63600175 PHARM REV CODE 636 W HCPCS: Performed by: STUDENT IN AN ORGANIZED HEALTH CARE EDUCATION/TRAINING PROGRAM

## 2018-11-17 PROCEDURE — 83735 ASSAY OF MAGNESIUM: CPT | Mod: 91

## 2018-11-17 PROCEDURE — 99232 PR SUBSEQUENT HOSPITAL CARE,LEVL II: ICD-10-PCS | Mod: ,,, | Performed by: INTERNAL MEDICINE

## 2018-11-17 PROCEDURE — 36430 TRANSFUSION BLD/BLD COMPNT: CPT

## 2018-11-17 PROCEDURE — A4217 STERILE WATER/SALINE, 500 ML: HCPCS | Performed by: PEDIATRICS

## 2018-11-17 PROCEDURE — 86901 BLOOD TYPING SEROLOGIC RH(D): CPT

## 2018-11-17 PROCEDURE — 99233 SBSQ HOSP IP/OBS HIGH 50: CPT | Mod: ,,, | Performed by: INTERNAL MEDICINE

## 2018-11-17 PROCEDURE — 37799 UNLISTED PX VASCULAR SURGERY: CPT

## 2018-11-17 PROCEDURE — 63600175 PHARM REV CODE 636 W HCPCS: Performed by: HOSPITALIST

## 2018-11-17 PROCEDURE — 99232 PR SUBSEQUENT HOSPITAL CARE,LEVL II: ICD-10-PCS | Mod: ,,, | Performed by: NURSE PRACTITIONER

## 2018-11-17 PROCEDURE — 63600175 PHARM REV CODE 636 W HCPCS: Performed by: PEDIATRICS

## 2018-11-17 PROCEDURE — 82803 BLOOD GASES ANY COMBINATION: CPT

## 2018-11-17 PROCEDURE — 99232 SBSQ HOSP IP/OBS MODERATE 35: CPT | Mod: ,,, | Performed by: INTERNAL MEDICINE

## 2018-11-17 PROCEDURE — 83735 ASSAY OF MAGNESIUM: CPT

## 2018-11-17 PROCEDURE — 80069 RENAL FUNCTION PANEL: CPT | Mod: 91

## 2018-11-17 RX ORDER — HYDROCODONE BITARTRATE AND ACETAMINOPHEN 500; 5 MG/1; MG/1
TABLET ORAL
Status: DISCONTINUED | OUTPATIENT
Start: 2018-11-17 | End: 2018-11-19

## 2018-11-17 RX ORDER — MAGNESIUM SULFATE HEPTAHYDRATE 40 MG/ML
2 INJECTION, SOLUTION INTRAVENOUS
Status: DISCONTINUED | OUTPATIENT
Start: 2018-11-17 | End: 2018-11-18

## 2018-11-17 RX ORDER — CIPROFLOXACIN 2 MG/ML
400 INJECTION, SOLUTION INTRAVENOUS
Status: DISCONTINUED | OUTPATIENT
Start: 2018-11-17 | End: 2018-11-19

## 2018-11-17 RX ORDER — ALBUMIN HUMAN 50 G/1000ML
12.5 SOLUTION INTRAVENOUS ONCE
Status: COMPLETED | OUTPATIENT
Start: 2018-11-17 | End: 2018-11-17

## 2018-11-17 RX ORDER — LINEZOLID 2 MG/ML
600 INJECTION, SOLUTION INTRAVENOUS
Status: DISCONTINUED | OUTPATIENT
Start: 2018-11-17 | End: 2018-11-19

## 2018-11-17 RX ORDER — HYDROCODONE BITARTRATE AND ACETAMINOPHEN 500; 5 MG/1; MG/1
TABLET ORAL CONTINUOUS
Status: DISCONTINUED | OUTPATIENT
Start: 2018-11-17 | End: 2018-11-18

## 2018-11-17 RX ORDER — ALBUMIN HUMAN 250 G/1000ML
25 SOLUTION INTRAVENOUS
Status: COMPLETED | OUTPATIENT
Start: 2018-11-17 | End: 2018-11-17

## 2018-11-17 RX ORDER — HYDROCODONE BITARTRATE AND ACETAMINOPHEN 500; 5 MG/1; MG/1
TABLET ORAL
Status: DISCONTINUED | OUTPATIENT
Start: 2018-11-18 | End: 2018-11-17

## 2018-11-17 RX ORDER — ALBUMIN HUMAN 50 G/1000ML
25 SOLUTION INTRAVENOUS ONCE
Status: COMPLETED | OUTPATIENT
Start: 2018-11-17 | End: 2018-11-17

## 2018-11-17 RX ADMIN — HYDROCORTISONE SODIUM SUCCINATE 50 MG: 100 INJECTION, POWDER, FOR SOLUTION INTRAMUSCULAR; INTRAVENOUS at 02:11

## 2018-11-17 RX ADMIN — PROPOFOL 40 MCG/KG/MIN: 10 INJECTION, EMULSION INTRAVENOUS at 04:11

## 2018-11-17 RX ADMIN — ALBUMIN HUMAN 25 G: 0.25 SOLUTION INTRAVENOUS at 05:11

## 2018-11-17 RX ADMIN — Medication 1 MG: at 08:11

## 2018-11-17 RX ADMIN — FENTANYL CITRATE 25 MCG: 50 INJECTION INTRAMUSCULAR; INTRAVENOUS at 03:11

## 2018-11-17 RX ADMIN — LINEZOLID 600 MG: 600 INJECTION, SOLUTION INTRAVENOUS at 08:11

## 2018-11-17 RX ADMIN — PROPOFOL 40 MCG/KG/MIN: 10 INJECTION, EMULSION INTRAVENOUS at 08:11

## 2018-11-17 RX ADMIN — ALBUMIN HUMAN 25 G: 0.05 INJECTION, SOLUTION INTRAVENOUS at 06:11

## 2018-11-17 RX ADMIN — CIPROFLOXACIN 400 MG: 2 INJECTION, SOLUTION INTRAVENOUS at 09:11

## 2018-11-17 RX ADMIN — ALBUMIN HUMAN 12.5 G: 0.05 INJECTION, SOLUTION INTRAVENOUS at 05:11

## 2018-11-17 RX ADMIN — INSULIN ASPART 2 UNITS: 100 INJECTION, SOLUTION INTRAVENOUS; SUBCUTANEOUS at 12:11

## 2018-11-17 RX ADMIN — HYDROCORTISONE SODIUM SUCCINATE 50 MG: 100 INJECTION, POWDER, FOR SOLUTION INTRAMUSCULAR; INTRAVENOUS at 10:11

## 2018-11-17 RX ADMIN — CIPROFLOXACIN 400 MG: 2 INJECTION, SOLUTION INTRAVENOUS at 08:11

## 2018-11-17 RX ADMIN — FAMOTIDINE 20 MG: 10 INJECTION, SOLUTION INTRAVENOUS at 08:11

## 2018-11-17 RX ADMIN — PROPOFOL 35 MCG/KG/MIN: 10 INJECTION, EMULSION INTRAVENOUS at 01:11

## 2018-11-17 RX ADMIN — ALBUMIN HUMAN 12.5 G: 0.05 INJECTION, SOLUTION INTRAVENOUS at 01:11

## 2018-11-17 RX ADMIN — PREDNISONE 20 MG: 20 TABLET ORAL at 08:11

## 2018-11-17 RX ADMIN — FLUCONAZOLE IN SODIUM CHLORIDE 200 MG: 2 INJECTION, SOLUTION INTRAVENOUS at 03:11

## 2018-11-17 RX ADMIN — PROPOFOL 50 MCG/KG/MIN: 10 INJECTION, EMULSION INTRAVENOUS at 09:11

## 2018-11-17 RX ADMIN — MAGNESIUM SULFATE IN WATER 2 G: 40 INJECTION, SOLUTION INTRAVENOUS at 07:11

## 2018-11-17 RX ADMIN — ALBUMIN HUMAN 25 G: 0.25 SOLUTION INTRAVENOUS at 09:11

## 2018-11-17 RX ADMIN — CALCIUM GLUCONATE: 94 INJECTION, SOLUTION INTRAVENOUS at 09:11

## 2018-11-17 RX ADMIN — PROPOFOL 40 MCG/KG/MIN: 10 INJECTION, EMULSION INTRAVENOUS at 01:11

## 2018-11-17 RX ADMIN — PROPOFOL 40 MCG/KG/MIN: 10 INJECTION, EMULSION INTRAVENOUS at 05:11

## 2018-11-17 NOTE — ASSESSMENT & PLAN NOTE
Will clarify with patient is hx DM once extubated.   BG goal 140-180.     BG monitoring q6 hours and low dose correction scale.     Discharge plans- TBD

## 2018-11-17 NOTE — SUBJECTIVE & OBJECTIVE
Scheduled Meds:   albumin human 25%  25 g Intravenous Q8H    ciprofloxacin (CIPRO)400mg/200ml D5W IVPB  400 mg Intravenous Q12H    famotidine (PF)  20 mg Intravenous Daily    fluconazole (DIFLUCAN) IVPB  200 mg Intravenous Q24H    heparin (porcine)  5,000 Units Subcutaneous Q8H    hydrocortisone sodium succinate  50 mg Intravenous Q8H    linezolid 600mg/300ml  600 mg Intravenous Q12H    [START ON 11/21/2018] valganciclovir 50 mg/ml  450 mg Per NG tube Daily     Continuous Infusions:   sodium chloride 0.9%      norepinephrine bitartrate-D5W Stopped (11/17/18 1100)    propofol 40 mcg/kg/min (11/17/18 1600)    TPN ADULT CENTRAL LINE CUSTOM       PRN Meds:sodium chloride, sodium chloride, sodium chloride, dextrose 50%, fentaNYL, glucagon (human recombinant), insulin aspart U-100, magnesium sulfate IVPB, oxyCODONE, oxyCODONE, ramelteon    Review of Systems   Unable to perform ROS: Intubated     Objective:     Vital Signs (Most Recent):  Temp: 97.2 °F (36.2 °C) (11/17/18 1515)  Pulse: 89 (11/17/18 1600)  Resp: 10 (11/17/18 1600)  BP: 95/63 (11/17/18 1530)  SpO2: 100 % (11/17/18 1600) Vital Signs (24h Range):  Temp:  [95 °F (35 °C)-98.3 °F (36.8 °C)] 97.2 °F (36.2 °C)  Pulse:  [] 89  Resp:  [0-22] 10  SpO2:  [93 %-100 %] 100 %  BP: ()/(54-77) 95/63  Arterial Line BP: ()/(47-78) 110/60     Weight: 98.1 kg (216 lb 4.3 oz)  Body mass index is 31.03 kg/m².    Intake/Output - Last 3 Shifts       11/15 0700 - 11/16 0659 11/16 0700 - 11/17 0659 11/17 0700 - 11/18 0659    I.V. (mL/kg) 4978.7 (50.8) 7797.6 (79.5)     Blood 257 2456 218    NG/GT   60    IV Piggyback       Total Intake(mL/kg) 5235.7 (53.4) 50905.6 (104.5) 278 (2.8)    Urine (mL/kg/hr) 10 (0)  5 (0)    Drains  120 0    Other 7196 4223 3452    Stool 0      Blood  100     Total Output 7206 4443 3457    Net -1970.3 +5810.6 -3179           Stool Occurrence 1 x            Physical Exam   Constitutional: He appears well-developed.   jaundiced    HENT:   Head: Normocephalic and atraumatic.   Eyes: Scleral icterus is present.   Cardiovascular: Normal rate, regular rhythm and intact distal pulses.   Pulmonary/Chest: Effort normal. No respiratory distress.   On vent   Vent Mode: A/C  Oxygen Concentration (%):  (40-99) 40  Resp Rate Total:  (16 br/min-37 br/min) 17 br/min  Vt Set:  (320 mL) 320 mL  PEEP/CPAP:  (5 cmH20) 5 cmH20  Mean Airway Pressure:  (7.8 cmH20-10 cmH20) 8.6 cmH20     Abdominal: Soft. He exhibits no distension.   athera blue sponge wound vac with 1 JOSE A drain in place with no output, wound vac putting out brighter SS output in the 100s/H/    Musculoskeletal: He exhibits edema.   Neurological:   Sedated on propofol   Skin: Skin is warm and dry.   Jaundice improving       Laboratory:  Immunosuppressants     None        Labs within the past 24 hours have been reviewed.    Diagnostic Results:  I have personally reviewed all pertinent imaging studies.

## 2018-11-17 NOTE — ASSESSMENT & PLAN NOTE
Avoid insulin stacking and hypoglycemia.  CRRT per nephrology  Lab Results   Component Value Date    CREATININE 1.0 11/17/2018    CREATININE 1.0 11/17/2018

## 2018-11-17 NOTE — ASSESSMENT & PLAN NOTE
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   - s/p OHLTx 11/11/2018   - remains anuric   - Had intra-op SLED  - CVP 10 this am    Plan:  - Will cont SLED for metabolic clearance, will continue and reassess in am   - Hemodynamically unstable on Norepi post op and intubated  - -300 ml/hr as tolerated will adjust once he settles post op  - Remains anuric  - Hemodynamically unstable on Norepi titrating for MAP > 65  - Strict I/O and chart  - Avoid nephrotoxic medications  - please keep Hb > 7 gm/dL or higher if symptomatic  - Medication doses adjusted to GFR

## 2018-11-17 NOTE — SUBJECTIVE & OBJECTIVE
Interval History/Significant Events:     OR x 2 yesterday tb for bleed, initially for bleeding in the right adrenal bed noted 1.5 L clot evacuated with hemostasis via argon beam, suture, nukit/fibrillar/surgiflo) post-op c/b hypotension/tachycardia thus taken back with 2L evacuated of old clot with suture/argon, evarest for hemostasis, Hct low of 20 pre-op, overnight downtrended from 34 to 29. Remains sedated this morning on propofol 40, received 1L albumin, yesterday in total received 7 PRBC, 3 plt, and 1 FFP.        Follow-up For: Procedure(s) (LRB):  TRANSPLANT, LIVER (N/A) - POD 6     OLT TB 10/16       Objective:     Vital Signs (Most Recent):  Temp: 98.3 °F (36.8 °C) (11/17/18 0300)  Pulse: (!) 113 (11/17/18 0415)  Resp: 15 (11/17/18 0415)  BP: 104/62 (11/17/18 0400)  SpO2: 100 % (11/17/18 0415) Vital Signs (24h Range):  Temp:  [95 °F (35 °C)-98.6 °F (37 °C)] 98.3 °F (36.8 °C)  Pulse:  [] 113  Resp:  [0-39] 15  SpO2:  [95 %-100 %] 100 %  BP: ()/(54-81) 104/62  Arterial Line BP: ()/(35-79) 106/61     Weight: 98.1 kg (216 lb 4.3 oz)  Body mass index is 31.03 kg/m².      Intake/Output Summary (Last 24 hours) at 11/17/2018 0500  Last data filed at 11/17/2018 0400  Gross per 24 hour   Intake 05684.55 ml   Output 4305 ml   Net 5948.55 ml       Physical Exam   Constitutional: He appears well-developed.   jaundiced   HENT:   Head: Normocephalic and atraumatic.   Eyes: Scleral icterus is present.   Cardiovascular: Normal rate, regular rhythm and intact distal pulses.   Pulmonary/Chest: Effort normal. No respiratory distress.   On vent   Vent Mode: A/C  Oxygen Concentration (%):  (40-99) 40  Resp Rate Total:  (16 br/min-37 br/min) 17 br/min  Vt Set:  (320 mL) 320 mL  PEEP/CPAP:  (5 cmH20) 5 cmH20  Mean Airway Pressure:  (7.8 cmH20-10 cmH20) 8.6 cmH20     Abdominal: Soft. He exhibits no distension.   athera blue sponge wound vac with 1 JOSE A drain in place with no output, wound vac putting out brighter SS  output in the 100s/H/    Musculoskeletal: He exhibits edema.   Neurological:   Sedated on propofol   Skin: Skin is warm and dry.   Jaundice improving       Lines/Drains/Airways     Central Venous Catheter Line                 Percutaneous Central Line Insertion/Assessment - double lumen  right internal jugular -- days         Tunneled Central Line Insertion/Assessment - Double Lumen  11/07/18 1350 right internal jugular 9 days          Drain                 Urethral Catheter 11/11/18 0246 Straight-tip;Non-latex 16 Fr. 6 days         Closed/Suction Drain 11/16/18 1127 Right;Anterior RLQ Bulb 19 Fr. less than 1 day         Closed/Suction Drain 11/16/18 1758 Right Abdomen Bulb 19 Fr. less than 1 day         NG/OG Tube 11/16/18 1500 less than 1 day          Airway                 Airway - Non-Surgical 11/16/18 1643 Endotracheal Tube less than 1 day          Arterial Line                 Arterial Line 11/11/18 0159 Left Radial 6 days          Pressure Ulcer                 Negative Pressure Wound Therapy  less than 1 day          Surgical Packing                 Surgical Packing less than 1 day                Significant Labs:    CBC/Anemia Profile:  Recent Labs   Lab 11/16/18 2001 11/16/18 2142 11/16/18  2351   WBC 24.77* 28.15* 22.25*   HGB 11.4* 11.4* 10.2*   HCT 35.4* 34.1* 29.5*   PLT 86* 100* 72*   MCV 92 90 88   RDW 14.4 14.6* 14.6*        Chemistries:  Recent Labs   Lab 11/16/18  1336 11/16/18  1506 11/16/18  1845 11/16/18 2001 11/16/18 2142 11/16/18  2351   *  135*  135* 135* 138 136 138  138  138 139   K 5.1  5.1  5.1 5.3* 4.8 4.9 5.0  5.0  5.0 5.1     106  106 105 110 108 108  108  108 107   CO2 21*  21*  21* 25 21* 19* 24  24  24 25   BUN 11  11  11 12 13 15 10  10  10 9   CREATININE 0.8  0.8  0.8 0.9 0.8 0.9 0.7  0.7  0.7 0.8   CALCIUM 7.7*  7.7*  7.7* 7.7* 8.3* 8.3* 7.6*  7.6*  7.6* 7.8*   ALBUMIN 1.9*  1.9*  1.9* 2.3* 2.0* 2.4* 2.3*  2.3*  2.3* 2.6*   PROT   --  3.7* 3.1* 3.9*  --  3.9*   BILITOT  --  11.7* 9.3* 13.1*  --  13.7*   ALKPHOS  --  213* 120 149*  --  134   ALT  --  201* 136* 165*  --  152*   AST  --  109* 104* 119*  --  96*   MG 1.9  1.9  1.9 2.0  2.0  --   --  1.7  1.7  1.7  --    PHOS 2.6*  2.6*  2.6* 3.1  --   --  2.8  2.8  2.8  --        Significant Imaging:  I have reviewed and interpreted all pertinent imaging results/findings within the past 24 hours.

## 2018-11-17 NOTE — OP NOTE
Operative Report    Date of Procedure: 11/16/2018    Surgeons:  Surgeon(s) and Role:     * Amadou Lester Jr., MD - Primary     * Taras Wylie MD - Fellow    First Assistant Attestation:  The indicated resident served as first assistant for this procedure.    Pre-operative Diagnosis: intraabdominal hemorrhage and hematoma  Post-operative Diagnosis: same, retroperitoneal hematoma, retrorenal hematoma    Procedure(s) Perfomed: Exploration of abdomen and Evacuation of hematoma, resection of superior portion of right adrenal gland (partial adrenalectomy), temporary abdominal closure with wound vac  Anesthesia: General endotracheal  Estimated Blood Loss: 2L old blood , 100ml active blood loss    Findings: similar to prior case, retroperitoneal /retrorenal/retrocolic hematoma on the right . Raw retroperitoneal bleeding.   Suture ligatures placed, argon beam cautery, evarest placed in retroperitoneum behind cava and right kidney.  Significant oozing from upper right adrenal gland, not amenable to ligation.    Specimens: right adrenal gland portion  Drains: 19f Sarkis drains x 1; temporary abdominal closure with wound vac    Preamble  Indications and Patient Counseling: The procedure was thoroughly explained to the patient and/or family members as appropriate, including potential risks, complications, and alternatives.  The risks associated with not undergoing the proposed procedure were also discussed.  All questions were answered, and the patient and/or family members voiced understanding and agreed to proceed with the operation.    Time-Out: A complete time out was carried out prior to incision, with confirmation of patient identity, correct procedure, correct operative site, appropriate antibiotic prophylaxis, review of any known allergies, and presence of all needed equipment.    Procedure in Detail  Following the induction of general endotracheal anesthesia, appropriate arterial and venous lines were placed by the  anesthesia team.  Care was taken to pad all pressure points and avoid any potential traction injuries from positioning. The urinary bladder was catheterized.  Sequential compression boots and Sang Huggers were used. The abdomen was sterilely prepped and draped.    The abdomen was entered by re-opening the liver transplant incision.  Cultures were obtained of any subcutaneous and peritoneal fluid or clot as appropriate.  Significant peritoneal fluid was present, and it was bloody in nature.  Significant hematoma was present. There was moderate active bleeding. The bile duct was assessed, and there was not visible evidence of a bile leak.  The hepatic artery was palpated, and the thrill was good. The liver itself was soft to palpation, and had a well-perfused appearance.  Additional intraabdominal findings included,   similar to prior case, retroperitoneal /retrorenal/retrocolic hematoma on the right . Raw retroperitoneal bleeding. Suture ligatures placed, argon beam cautery, evarest placed in retroperitoneum behind cava and right kidney.  Significant oozing from upper right adrenal gland, not amenable to ligation. So that portion of the adrenal gland was resected with cautery.      After the above exploration, all identifiable bleeding sites were addressed using electrocautery, argon beam coagulation, suture ligatures, and topical hemostatic agents as appropriate.  A needle biopsy of the liver was obtained.        A final check for hemostasis was made.  1 19f Sarkis drains were placed through separate incisions below the transverse abdominal incision and placed in the usual positions. The cavity was irrigated with saline. The abdomen was not closed, but temporary abdominal closure with AbThera was placed. . At the end of the case all instrument, needle, and sponge counts were correct. The patient was taken from the OR in stable condition.

## 2018-11-17 NOTE — PROGRESS NOTES
[facility]  Critical Care - Surgery  Progress Note    Patient Name: Femi Enciso  MRN: [id number]  Admission Date: 10/27/2018  Hospital Length of Stay: 21 days  Code Status: Full Code  Attending Provider: Femi Patel MD  Primary Care Provider: Primary Doctor No   Principal Problem: S/P liver transplant    Subjective:     Hospital/ICU Course:  11/11: s/p liver transplant  11/12 - extubated, weaned off pressors; pulled out all 3 JOSE A drains  11/13 - CRRT held overnight; 1U Plt  11/14 -2u pRBC  11/15- 1u prbc  11/16- OR x 2 for bleeding, abthera + JOSE A x 1, 7 PRBC, 3 plt, 1 FFP    Interval History/Significant Events:     OR x 2 yesterday tb for bleed, initially for bleeding in the right adrenal bed noted 1.5 L clot evacuated with hemostasis via argon beam, suture, nukit/fibrillar/surgiflo) post-op c/b hypotension/tachycardia thus taken back with 2L evacuated of old clot with suture/argon, evarest for hemostasis, Hct low of 20 pre-op, overnight downtrended from 34 to 29. Remains sedated this morning on propofol 40, received 1L albumin, yesterday in total received 7 PRBC, 3 plt, and 1 FFP.        Follow-up For: Procedure(s) (LRB):  TRANSPLANT, LIVER (N/A) - POD 6     OLT TB 10/16       Objective:     Vital Signs (Most Recent):  Temp: 98.3 °F (36.8 °C) (11/17/18 0300)  Pulse: (!) 113 (11/17/18 0415)  Resp: 15 (11/17/18 0415)  BP: 104/62 (11/17/18 0400)  SpO2: 100 % (11/17/18 0415) Vital Signs (24h Range):  Temp:  [95 °F (35 °C)-98.6 °F (37 °C)] 98.3 °F (36.8 °C)  Pulse:  [] 113  Resp:  [0-39] 15  SpO2:  [95 %-100 %] 100 %  BP: ()/(54-81) 104/62  Arterial Line BP: ()/(35-79) 106/61     Weight: 98.1 kg (216 lb 4.3 oz)  Body mass index is 31.03 kg/m².      Intake/Output Summary (Last 24 hours) at 11/17/2018 0500  Last data filed at 11/17/2018 0400  Gross per 24 hour   Intake 21185.55 ml   Output 4305 ml   Net 5948.55 ml       Physical Exam   Constitutional: He appears well-developed.    jaundiced   HENT:   Head: Normocephalic and atraumatic.   Eyes: Scleral icterus is present.   Cardiovascular: Normal rate, regular rhythm and intact distal pulses.   Pulmonary/Chest: Effort normal. No respiratory distress.   On vent   Vent Mode: A/C  Oxygen Concentration (%):  (40-99) 40  Resp Rate Total:  (16 br/min-37 br/min) 17 br/min  Vt Set:  (320 mL) 320 mL  PEEP/CPAP:  (5 cmH20) 5 cmH20  Mean Airway Pressure:  (7.8 cmH20-10 cmH20) 8.6 cmH20     Abdominal: Soft. He exhibits no distension.   athera blue sponge wound vac with 1 JOSE A drain in place with no output, wound vac putting out brighter SS output in the 100s/H/    Musculoskeletal: He exhibits edema.   Neurological:   Sedated on propofol   Skin: Skin is warm and dry.   Jaundice improving       Lines/Drains/Airways     Central Venous Catheter Line                 Percutaneous Central Line Insertion/Assessment - double lumen  right internal jugular -- days         Tunneled Central Line Insertion/Assessment - Double Lumen  11/07/18 1350 right internal jugular 9 days          Drain                 Urethral Catheter 11/11/18 0246 Straight-tip;Non-latex 16 Fr. 6 days         Closed/Suction Drain 11/16/18 1127 Right;Anterior RLQ Bulb 19 Fr. less than 1 day         Closed/Suction Drain 11/16/18 1758 Right Abdomen Bulb 19 Fr. less than 1 day         NG/OG Tube 11/16/18 1500 less than 1 day          Airway                 Airway - Non-Surgical 11/16/18 1643 Endotracheal Tube less than 1 day          Arterial Line                 Arterial Line 11/11/18 0159 Left Radial 6 days          Pressure Ulcer                 Negative Pressure Wound Therapy  less than 1 day          Surgical Packing                 Surgical Packing less than 1 day                Significant Labs:    CBC/Anemia Profile:  Recent Labs   Lab 11/16/18 2001 11/16/18  2142 11/16/18  2351   WBC 24.77* 28.15* 22.25*   HGB 11.4* 11.4* 10.2*   HCT 35.4* 34.1* 29.5*   PLT 86* 100* 72*   MCV 92 90 88   RDW  14.4 14.6* 14.6*        Chemistries:  Recent Labs   Lab 11/16/18  1336 11/16/18  1506 11/16/18  1845 11/16/18 2001 11/16/18 2142 11/16/18  2351   *  135*  135* 135* 138 136 138  138  138 139   K 5.1  5.1  5.1 5.3* 4.8 4.9 5.0  5.0  5.0 5.1     106  106 105 110 108 108  108  108 107   CO2 21*  21*  21* 25 21* 19* 24  24  24 25   BUN 11  11  11 12 13 15 10  10  10 9   CREATININE 0.8  0.8  0.8 0.9 0.8 0.9 0.7  0.7  0.7 0.8   CALCIUM 7.7*  7.7*  7.7* 7.7* 8.3* 8.3* 7.6*  7.6*  7.6* 7.8*   ALBUMIN 1.9*  1.9*  1.9* 2.3* 2.0* 2.4* 2.3*  2.3*  2.3* 2.6*   PROT  --  3.7* 3.1* 3.9*  --  3.9*   BILITOT  --  11.7* 9.3* 13.1*  --  13.7*   ALKPHOS  --  213* 120 149*  --  134   ALT  --  201* 136* 165*  --  152*   AST  --  109* 104* 119*  --  96*   MG 1.9  1.9  1.9 2.0  2.0  --   --  1.7  1.7  1.7  --    PHOS 2.6*  2.6*  2.6* 3.1  --   --  2.8  2.8  2.8  --        Significant Imaging:  I have reviewed and interpreted all pertinent imaging results/findings within the past 24 hours.    Assessment/Plan:     * S/P liver transplant    S/P liver transplant     54 y/o man with ESLD, ESRD and DM2 now s/p liver transplant with subsequent takeback on POD 5 (11/16) due to bleeding.      Neuro:   - Sedation: propofol 40  - Pain control: PRN fentanyl     Pulmonary:   - intubated, likely remaining intubated until formal closure   - daily cxr  - duonebs prn     Cardiac:  - Drips: on levo 0.06, s/p 1 L albumin bolus overnight   - HDS     Renal:   -CRRT overnight  -trend BMP  - BUN/Cr: 13/1.5 -> 35/2.7-> 31/2.1-->7/0.7 remains at 1.0   - Nephrology on board, appreciate recommendations     Infectious Disease:   - AF  - Trend WBC - 12 -> 13->12->9.6, spiked to 24 post tb   - Abx: completed course of Cipro (UCx 11/11 grew Enterococcus)  - Prophylaxis per transplant - Valcyte, Diflucan  - IS per Transplant - MMF, Prograf     Hematology/Oncology:  - H&H 7.4/20.8 -> 8.0/23, s/p 7 U PRBC, Hct 34 down  to 28   - Plt 84 -> 59 -> 50 -> 63 (after 1 U Plt) -> 52->51->42  - INR 1.2 to 1.4   - Trend CBC      Endocrine:  - BG 140s-150s   - SSI   - Endocrinology on board, appreciate recommendations     Fluids/Electrolytes/Nutrition/GI:   - NPO while intubated   - Trend CMP  - Replace Lytes PRN  - liver US 11/12 - 6.6 cm complex fluid collection deep to left lobe; increased flow velocity in main hepatic artery      Prophylaxis:  - SQH  - Famotidine     Dispo:  Continue ICU care - continuing on CRRT, open abdomen w/ abthera, pending closure, intubated on levo   Primary team:Transplant               Critical care was time spent personally by me on the following activities: development of treatment plan with patient or surrogate and bedside caregivers, discussions with consultants, evaluation of patient's response to treatment, examination of patient, ordering and performing treatments and interventions, ordering and review of laboratory studies, ordering and review of radiographic studies, pulse oximetry, re-evaluation of patient's condition.  This critical care time did not overlap with that of any other provider or involve time for any procedures.     Ashley Liz MD  Critical Care - Surgery  [facility]

## 2018-11-17 NOTE — CONSULTS
[facility]  Infectious Disease  Consult Note    Patient Name: Femi Enciso  MRN: [id number]  Admission Date: 10/27/2018  Hospital Length of Stay: 21 days  Attending Physician: Femi Patel MD  Primary Care Provider: Primary Doctor No     Isolation Status: Contact    Patient information was obtained from spouse/SO and ER records.      Consults  Assessment/Plan:     Leucocytosis    Plan:     1. For now keep on zyvox for previous VRE urinary infection, cipro and diflucan.   2.  Repeat WBC already coming down - leucocytosis likely due to recent surgeries and perhaps not an infectious process    ID will follow with you and will ask Dr. Ireland for advice over long-term fungal/mold prophylaxis          Thank you for your consult. I will follow-up with patient. Please contact us if you have any additional questions.    Lui Piña MD  Infectious Disease  [facility]    Subjective:     Principal Problem: S/P liver transplant    HPI: 52 y/o M h/o DM2, ETOH dependence c/b hepatitis admitted 10/27 for acute liver failure (c/b PSE, HRS, hepatic hydrothorax) and transplant evaluation  s/p DBDLT 11/11/18( CMV D+/R-, steroid induction, MMF/tacro maintenance) c/b significant blood product transfusions now extubated and ID consulted for delerium    Past Medical History:   Diagnosis Date    Liver cirrhosis, alcoholic 09/30/2018       Past Surgical History:   Procedure Laterality Date    EGD (ESOPHAGOGASTRODUODENOSCOPY) N/A 11/6/2018    Performed by Duarte Jules MD at [facility] ([address] [place] [address])    ESOPHAGOGASTRODUODENOSCOPY N/A 11/6/2018    Procedure: EGD (ESOPHAGOGASTRODUODENOSCOPY);  Surgeon: Duarte Jules MD;  Location: [facility] ([address] [place] [address]);  Service: Endoscopy;  Laterality: N/A;    INSERTION OF TUNNELED CENTRAL VENOUS HEMODIALYSIS CATHETER N/A 11/7/2018    Procedure: INSERTION, CATHETER, CENTRAL VENOUS, HEMODIALYSIS, TUNNELED;  Surgeon: Brock Gonzalez MD;  Location: [facility] CATH LAB;   Service: Nephrology;  Laterality: N/A;    INSERTION, CATHETER, CENTRAL VENOUS, HEMODIALYSIS, TUNNELED N/A 11/7/2018    Performed by Brock Gonzalez MD at [facility] CATH LAB    LIVER TRANSPLANT N/A 11/11/2018    Procedure: TRANSPLANT, LIVER;  Surgeon: Shmuel Leary MD;  Location: [facility] OR [address] [place] [address];  Service: Transplant;  Laterality: N/A;    TRANSPLANT, LIVER N/A 11/11/2018    Performed by Shmuel Leary MD at [facility] OR [address] [place] [address]       Review of patient's allergies indicates:   Allergen Reactions    Cefepime Rash    Penicillins Nausea And Vomiting and Rash     Full body rash from cefepime as well       Medications:  Medications Prior to Admission   Medication Sig    calcium carbonate/vitamin D3 (VITAMIN D-3 ORAL) Take 1 tablet by mouth once daily.    cyanocobalamin (VITAMIN B-12) 100 MCG tablet Take 100 mcg by mouth once daily.    folic acid (FOLVITE) 1 MG tablet Take 1 mg by mouth once daily.    lactulose (CHRONULAC) 10 gram/15 mL solution Take 20 g by mouth 3 (three) times daily.    multivitamin (THERAGRAN) per tablet Take 1 tablet by mouth once daily.    omeprazole (PRILOSEC) 40 MG capsule Take 40 mg by mouth once daily.    ondansetron (ZOFRAN) 4 MG tablet Take 4 mg by mouth daily as needed for Nausea.    rifAXIMin (XIFAXAN) 550 mg Tab Take 550 mg by mouth 2 (two) times daily.     Antibiotics (From admission, onward)    Start     Stop Route Frequency Ordered    11/17/18 0830  linezolid 600mg/300ml 600 mg/300 mL IVPB 600 mg      -- IV Every 12 hours (non-standard times) 11/17/18 0724    11/17/18 0830  ciprofloxacin (CIPRO)400mg/200ml D5W IVPB 400 mg      -- IV Every 12 hours (non-standard times) 11/17/18 0724        Antifungals (From admission, onward)    Start     Stop Route Frequency Ordered    11/11/18 1230  fluconazole (DIFLUCAN) IVPB 200 mg 100 mL      -- IV Every 24 hours (non-standard times) 11/11/18 1208        Antivirals (From admission, onward)        Stop Route Frequency      valganciclovir 50 mg/ml      -- PER NG TUBE Daily           Immunization History   Administered Date(s) Administered    Hepatitis A, Pediatric/Adolescent, 2 Dose 11/07/2018    Influenza - Quadrivalent - PF 10/04/2018    PPD Test 11/01/2018    Pneumococcal Conjugate - 13 Valent 11/06/2018       Family History     None        Social History     Socioeconomic History    Marital status:      Spouse name: None    Number of children: None    Years of education: None    Highest education level: None   Social Needs    Financial resource strain: None    Food insecurity - worry: None    Food insecurity - inability: None    Transportation needs - medical: None    Transportation needs - non-medical: None   Occupational History    None   Tobacco Use    Smoking status: Never Smoker   Substance and Sexual Activity    Alcohol use: None    Drug use: None    Sexual activity: None   Other Topics Concern    None   Social History Narrative    None     Review of Systems   Unable to perform ROS: Intubated     Objective:     Vital Signs (Most Recent):  Temp: 97.2 °F (36.2 °C) (11/17/18 1515)  Pulse: 93 (11/17/18 1515)  Resp: 18 (11/17/18 1515)  BP: 97/66 (11/17/18 1500)  SpO2: 100 % (11/17/18 1515) Vital Signs (24h Range):  Temp:  [95 °F (35 °C)-98.3 °F (36.8 °C)] 97.2 °F (36.2 °C)  Pulse:  [] 93  Resp:  [0-23] 18  SpO2:  [93 %-100 %] 100 %  BP: ()/(54-77) 97/66  Arterial Line BP: ()/(47-78) 118/64     Weight: 98.1 kg (216 lb 4.3 oz)  Body mass index is 31.03 kg/m².    Estimated Creatinine Clearance: 77.1 mL/min (based on SCr of 1.3 mg/dL).    Physical Exam   Constitutional: He appears well-developed and well-nourished.   HENT:   Head: Normocephalic and atraumatic.   Eyes: Pupils are equal, round, and reactive to light.   Neck: Normal range of motion. Neck supple.   Cardiovascular: Normal rate and regular rhythm.   Pulmonary/Chest: Breath sounds normal.   Anteriorly - intubated    Abdominal: He  exhibits distension.   Wound vac in place - no surrounding erythema    Genitourinary: Penis normal.   Musculoskeletal: Normal range of motion.   Neurological:   Sedated    Skin: Skin is warm and dry.   IV sites OK        Significant Labs: All pertinent labs within the past 24 hours have been reviewed.    Significant Imaging: I have reviewed all pertinent imaging results/findings within the past 24 hours.

## 2018-11-17 NOTE — PROGRESS NOTES
"[facility]  Endocrinology  Progress Note    Admit Date: 10/27/2018     Reason for Consult: Management of Hyperglycemia     Surgical Procedure and Date: liver transplant 11/11/18; exploratory lap and liver biopsy on 11/16/18     HPI:   Patient is a 53 y.o. male with a diagnosis of ESLD secondary to ETOH abuse and DEL with ESRD with RRT. Patient is now s/p liver transplant. Endocrinology consulted for BG management.  Patient reports having DM x 1 year, takes Metformin 1000 mg BID.      Lab Results   Component Value Date    HGBA1C 4.4 11/09/2018             Interval HPI:   Overnight events: remains in ICU. BG at or slightly above goal. Prednisone 20 mg daily. OR yesterday for bleeding. Remains intubated and sedated.   Eating:   NPO  Nausea: No  Hypoglycemia and intervention: No  Fever: No  TPN and/or TF: No    /60   Pulse 101   Temp 97.9 °F (36.6 °C) (Oral)   Resp 15   Ht 5' 10" (1.778 m)   Wt 98.1 kg (216 lb 4.3 oz)   SpO2 99%   BMI 31.03 kg/m²      Labs Reviewed and Include    Recent Labs   Lab 11/17/18  0406   *  150*   CALCIUM 7.7*  7.7*   ALBUMIN 2.7*  2.7*   PROT 3.9*     139   K 4.7  4.7   CO2 24  24     108   BUN 11  11   CREATININE 1.0  1.0   ALKPHOS 130   *   AST 93*   BILITOT 15.1*     Lab Results   Component Value Date    WBC 24.89 (H) 11/17/2018    HGB 9.7 (L) 11/17/2018    HCT 29.4 (L) 11/17/2018    MCV 90 11/17/2018    PLT 71 (L) 11/17/2018     No results for input(s): TSH, FREET4 in the last 168 hours.  Lab Results   Component Value Date    HGBA1C 4.4 11/09/2018       Nutritional status:   Body mass index is 31.03 kg/m².  Lab Results   Component Value Date    ALBUMIN 2.7 (L) 11/17/2018    ALBUMIN 2.7 (L) 11/17/2018    ALBUMIN 2.6 (L) 11/16/2018     Lab Results   Component Value Date    PREALBUMIN 7 (L) 10/27/2018       Estimated Creatinine Clearance: 100.3 mL/min (based on SCr of 1 mg/dL).    Accu-Checks  Recent Labs     11/15/18  1155 " 11/15/18  1717 11/15/18  2037 11/16/18  0010 11/16/18  0435 11/16/18  0709 11/16/18  1428 11/16/18  1507 11/17/18  0001 11/17/18  0405   POCTGLUCOSE 148* 117* 110 103 117* 101 191* 238* 147* 151*       Current Medications and/or Treatments Impacting Glycemic Control  Immunotherapy:    Immunosuppressants         Stop Route Frequency     tacrolimus (PROGRAF) 1 mg/mL oral syringe      -- Oral 2 times daily        Steroids:   Hormones (From admission, onward)    Start     Stop Route Frequency Ordered    11/17/18 0900  predniSONE tablet 20 mg  (methylprednisolone taper panel)      -- Oral Daily 11/11/18 1208    11/09/18 1345  methylPREDNISolone sodium succinate injection 500 mg  (Med - Immunosuppression Induction Therapy (Methylprednisolone))      11/10 0144 IV Once pre-op 11/09/18 1236        Pressors:    Autonomic Drugs (From admission, onward)    Start     Stop Route Frequency Ordered    11/16/18 1615  norepinephrine 4 mg in dextrose 5% 250 mL infusion (premix) (titrating)     Question Answer Comment   Titrate by: (in mcg/kg/min) 5    Titrate interval: (in minutes) 15    Titrate to maintain: (MAP or SBP) SBP    Greater than: (in mmHg) 100    Maximum dose: (in mcg/kg/min) 15        -- IV Continuous 11/16/18 1509        Hyperglycemia/Diabetes Medications:   Antihyperglycemics (From admission, onward)    Start     Stop Route Frequency Ordered    11/16/18 0826  insulin aspart U-100 pen 0-5 Units      -- SubQ Every 6 hours PRN 11/16/18 0727          ASSESSMENT and PLAN    * S/P liver transplant    Managed per LTS.   avoid hypoglycemia  Optimize BG control for surgical wound healing.     Lab Results   Component Value Date     (H) 11/17/2018    AST 93 (H) 11/17/2018     (H) 11/17/2018    ALKPHOS 130 11/17/2018    BILITOT 15.1 (H) 11/17/2018            Acute hyperglycemia    Will clarify with patient is hx DM once extubated.   BG goal 140-180.     BG monitoring q6 hours and low dose correction scale.      Discharge plans- TBD     Adrenal cortical steroids causing adverse effect in therapeutic use    On standard steroid taper per transplant team; may elevate BG readings         DEL (acute kidney injury)    Avoid insulin stacking and hypoglycemia.  CRRT per nephrology  Lab Results   Component Value Date    CREATININE 1.0 11/17/2018    CREATININE 1.0 11/17/2018            Acute renal failure    Avoid insulin stacking and hypoglycemia.         Prophylactic immunotherapy    May increase insulin resistance.            Tessa Falk NP  Endocrinology  [facility]

## 2018-11-17 NOTE — PROGRESS NOTES
[facility]  Nephrology  Progress Note    Patient Name: Femi Enciso  MRN: [id number]  Admission Date: 10/27/2018  Hospital Length of Stay: 21 days  Attending Provider: Femi Patel MD   Primary Care Physician: Primary Doctor No  Principal Problem:S/P liver transplant    Subjective:     HPI: 54 y/o man with DM2 presents to the ED with family for liver failure (likely due to EtOH abundant history of drinking - diagnosed in Sept 2018 in [place]).  He reports jaundice, generalized weakness, nausea, diarrhea, and decreased appetite since Sept 2018.  He is from [place], [place], and Dr. Sharma (patients physician) recommended bringing him to hospital for evaluation.  Patient denies any fever, chills, vomiting, chest pain, palpitations, SOB, abdominal pain.      Nephrology consulted for evaluation/management Adri.     Interval History:   HE returned intubated and on pressors from OR. CXR with Right side pleural effusion and packing noted RUQ. I/O's not accurate as no input was documented for CRRT He tolerated SLED overnight remains anuric. He return from OR on Norepi and intubated. He is about < 1 lt net neg with insensible losses but has high intake at this time.    Review of patient's allergies indicates:   Allergen Reactions    Penicillins Nausea And Vomiting and Rash     Current Facility-Administered Medications   Medication Frequency    0.9%  NaCl infusion (CRRT USE ONLY) Continuous    0.9%  NaCl infusion (for blood administration) Q24H PRN    albumin human 25% bottle 25 g Q8H    ciprofloxacin (CIPRO)400mg/200ml D5W IVPB 400 mg Q12H    dextrose 50% injection 12.5 g PRN    famotidine (PF) injection 20 mg Daily    fentaNYL injection 25 mcg Q1H PRN    fluconazole (DIFLUCAN) IVPB 200 mg 100 mL Q24H    glucagon (human recombinant) injection 1 mg PRN    heparin (porcine) injection 5,000 Units Q8H    hydrocortisone sodium succinate injection 50 mg Q8H    insulin aspart U-100 pen 0-5 Units Q6H PRN     linezolid 600mg/300ml 600 mg/300 mL IVPB 600 mg Q12H    magnesium sulfate 2g in water 50mL IVPB (premix) PRN    norepinephrine 4 mg in dextrose 5% 250 mL infusion (premix) (titrating) Continuous    oxyCODONE immediate release tablet 5 mg Q6H PRN    oxyCODONE immediate release tablet Tab 10 mg Q4H PRN    propofol (DIPRIVAN) 10 mg/mL infusion Continuous    ramelteon tablet 8 mg Nightly PRN    Standard [employer] ADULT TPN for patient WITH electrolyte abnormality or renal dysfunction (CENTRAL) Continuous    [START ON 11/21/2018] valganciclovir 50 mg/ml oral solution 450 mg Daily       Objective:     Vital Signs (Most Recent):  Temp: 96.7 °F (35.9 °C) (11/17/18 1015)  Pulse: 91 (11/17/18 1030)  Resp: 18 (11/17/18 1030)  BP: 110/73 (11/17/18 1000)  SpO2: 100 % (11/17/18 1030)  O2 Device (Oxygen Therapy): ventilator (11/17/18 1000) Vital Signs (24h Range):  Temp:  [95 °F (35 °C)-98.3 °F (36.8 °C)] 96.7 °F (35.9 °C)  Pulse:  [] 91  Resp:  [0-29] 18  SpO2:  [93 %-100 %] 100 %  BP: ()/(54-78) 110/73  Arterial Line BP: ()/(35-78) 142/74     Weight: 98.1 kg (216 lb 4.3 oz) (11/15/18 0500)  Body mass index is 31.03 kg/m².  Body surface area is 2.2 meters squared.    I/O last 3 completed shifts:  In: 47235.3 [I.V.:7849.3; Blood:2456]  Out: 7186 [Drains:120; Other:6966; Blood:100]    Physical Exam   Constitutional: He appears well-developed. He appears cachectic. He is cooperative. No distress. He is sedated and intubated.   Pleasantly disoriented but improved from yesterday.    HENT:   Head: Normocephalic and atraumatic.   Eyes: Conjunctivae are normal. Pupils are equal, round, and reactive to light.   Neck: Trachea normal. Neck supple. No JVD present.   Cardiovascular: Normal rate, regular rhythm, S1 normal, S2 normal and normal pulses. Exam reveals no gallop and no friction rub.   No murmur heard.  Pulmonary/Chest: Effort normal. He is intubated. He has decreased breath sounds in the right lower  field and the left lower field. He has no rhonchi.   Abdominal: Soft. He exhibits distension. He exhibits no ascites. Bowel sounds are decreased. There is no tenderness.       Musculoskeletal: Normal range of motion. He exhibits edema (edema+ trace pitting sacral).   Neurological:   Pleasantly disoriented no focal deficit.    Skin: Skin is warm and dry. Capillary refill takes less than 2 seconds.   Psychiatric: He has a normal mood and affect. His behavior is normal.   Vitals reviewed.      Significant Labs:  ABGs:   Recent Labs   Lab 11/17/18  0817   PH 7.318*   PCO2 53.4*   HCO3 27.4   POCSATURATED 89*   BE 1     BMP:   Recent Labs   Lab 11/17/18  0406 11/17/18  0800   *  150* 177*     108 106   CO2 24  24 25   BUN 11  11 12   CREATININE 1.0  1.0 1.2   CALCIUM 7.7*  7.7* 8.2*   MG 1.7  --      Cardiac Markers: No results for input(s): CKMB, TROPONINT, MYOGLOBIN in the last 168 hours.  CBC:   Recent Labs   Lab 11/17/18  0800   WBC 29.12*   RBC 3.10*   HGB 9.3*   HCT 27.4*   PLT 74*   MCV 88   MCH 30.0   MCHC 33.9     CMP:   Recent Labs   Lab 11/17/18  0800   *   CALCIUM 8.2*   ALBUMIN 3.3*   PROT 4.4*      K 4.9   CO2 25      BUN 12   CREATININE 1.2   ALKPHOS 119   *   AST 79*   BILITOT 15.0*     Coagulation:   Recent Labs   Lab 11/17/18  0406 11/17/18  0800   INR 1.4* 1.3*   APTT 30.1  --      No results for input(s): COLORU, CLARITYU, SPECGRAV, PHUR, PROTEINUA, GLUCOSEU, BILIRUBINCON, BLOODU, WBCU, RBCU, BACTERIA, MUCUS, NITRITE, LEUKOCYTESUR, UROBILINOGEN, HYALINECASTS in the last 168 hours.  All labs within the past 24 hours have been reviewed.     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   - s/p OHLTx 11/11/2018   - remains anuric   - Had intra-op SLED  - CVP 10 this am    Plan:  - Will cont SLED for metabolic clearance, will continue and reassess in am   - Hemodynamically unstable on Norepi post op and intubated  - -300 ml/hr as tolerated will adjust once he settles post op  - Remains anuric  - Hemodynamically unstable on Norepi titrating for MAP > 65  - Strict I/O and chart  - Avoid nephrotoxic medications  - please keep Hb > 7 gm/dL or higher if symptomatic  - Medication doses adjusted to GFR           Thank you for your consult. I will follow-up with patient. Please contact us if you have any additional questions.    Nikolay Nino MD  Nephrology  [facility]

## 2018-11-17 NOTE — ASSESSMENT & PLAN NOTE
S/P liver transplant     52 y/o man with ESLD, ESRD and DM2 now s/p liver transplant with subsequent takeback on POD 5 (11/16) due to bleeding.      Neuro:   - Sedation: propofol 40  - Pain control: PRN fentanyl     Pulmonary:   - intubated, likely remaining intubated until formal closure   - daily cxr  - duonebs prn     Cardiac:  - Drips: on levo 0.06, s/p 1 L albumin bolus overnight   - HDS     Renal:   -CRRT overnight  -trend BMP  - BUN/Cr: 13/1.5 -> 35/2.7-> 31/2.1-->7/0.7 remains at 1.0   - Nephrology on board, appreciate recommendations     Infectious Disease:   - AF  - Trend WBC - 12 -> 13->12->9.6, spiked to 24 post tb   - Abx: completed course of Cipro (UCx 11/11 grew Enterococcus)  - Prophylaxis per transplant - Valcyte, Diflucan  - IS per Transplant - MMF, Prograf     Hematology/Oncology:  - H&H 7.4/20.8 -> 8.0/23, s/p 7 U PRBC, Hct 34 down to 28   - Plt 84 -> 59 -> 50 -> 63 (after 1 U Plt) -> 52->51->42  - INR 1.2 to 1.4   - Trend CBC      Endocrine:  - BG 140s-150s   - SSI   - Endocrinology on board, appreciate recommendations     Fluids/Electrolytes/Nutrition/GI:   - NPO while intubated   - Trend CMP  - Replace Lytes PRN  - liver US 11/12 - 6.6 cm complex fluid collection deep to left lobe; increased flow velocity in main hepatic artery      Prophylaxis:  - SQH  - Famotidine     Dispo:  Continue ICU care - continuing on CRRT, open abdomen w/ abthera, pending closure, intubated on levo   Primary team:Transplant

## 2018-11-17 NOTE — ASSESSMENT & PLAN NOTE
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   - s/p OHLTx 11/11/2018   - remains anuric   - Had intra-op SLED  - CVP 1 now 5    Plan:  - Will restart SLED for metabolic clearance, will continue and reassess in am   - Hemodynamically unstable on Norepi post op and intubated  - -250 ml/hr as tolerated will adjust once he settles post op  - Remains anuric  - Strict I/O and chart  - Avoid nephrotoxic medications  - please keep Hb > 7 gm/dL or higher if symptomatic  - Medication doses adjusted to GFR

## 2018-11-17 NOTE — TRANSFER OF CARE
"Anesthesia Transfer of Care Note    Patient: Femi Enciso    Procedure(s) Performed: Procedure(s) (LRB):  LAPAROTOMY, EXPLORATORY (N/A)    Patient location: ICU    Anesthesia Type: general    Transport from OR: Continuos invasive BP monitoring in transport. Continuous SpO2 monitoring in transport. Continuous ECG monitoring in transport. Upon arrival to PACU/ICU, patient attached to ventilator and auscultated to confirm bilateral breath sounds and adequate TV. Transported from OR intubated on 100% O2 by AMBU with assisted ventilation. Continuous CVP monitoring in transport    Post pain: adequate analgesia    Post assessment: no apparent anesthetic complications and tolerated procedure well    Post vital signs: stable    Nausea/Vomiting: no nausea/vomiting    Complications: none    Transfer of care protocol was followed      Last vitals:   Visit Vitals  BP (!) 91/56 (BP Location: Left arm, Patient Position: Lying)   Pulse 107   Temp 36.6 °C (97.8 °F) (Oral)   Resp (!) 23   Ht 5' 10" (1.778 m)   Wt 98.1 kg (216 lb 4.3 oz)   SpO2 100%   BMI 31.03 kg/m²     "

## 2018-11-17 NOTE — CONSULTS
Full note to follow     Patient s/p liver tplt - with increasing WBC - now pan-cultured - on zyvox, cipro, diflucan and has a significant pen and cephalosporin allergy  - Ok with this right now   ID may expand coverage later

## 2018-11-17 NOTE — SUBJECTIVE & OBJECTIVE
Interval History:   HE returned intubated and on pressors from OR. CXR with Right side pleural effusion and packing noted RUQ. I/O's not accurate as no input was documented for CRRT He tolerated SLED overnight remains anuric. He return from OR on Norepi and intubated. He is about < 1 lt net neg with insensible losses but has high intake at this time.    Review of patient's allergies indicates:   Allergen Reactions    Penicillins Nausea And Vomiting and Rash     Current Facility-Administered Medications   Medication Frequency    0.9%  NaCl infusion (CRRT USE ONLY) Continuous    0.9%  NaCl infusion (for blood administration) Q24H PRN    albumin human 25% bottle 25 g Q8H    ciprofloxacin (CIPRO)400mg/200ml D5W IVPB 400 mg Q12H    dextrose 50% injection 12.5 g PRN    famotidine (PF) injection 20 mg Daily    fentaNYL injection 25 mcg Q1H PRN    fluconazole (DIFLUCAN) IVPB 200 mg 100 mL Q24H    glucagon (human recombinant) injection 1 mg PRN    heparin (porcine) injection 5,000 Units Q8H    hydrocortisone sodium succinate injection 50 mg Q8H    insulin aspart U-100 pen 0-5 Units Q6H PRN    linezolid 600mg/300ml 600 mg/300 mL IVPB 600 mg Q12H    magnesium sulfate 2g in water 50mL IVPB (premix) PRN    norepinephrine 4 mg in dextrose 5% 250 mL infusion (premix) (titrating) Continuous    oxyCODONE immediate release tablet 5 mg Q6H PRN    oxyCODONE immediate release tablet Tab 10 mg Q4H PRN    propofol (DIPRIVAN) 10 mg/mL infusion Continuous    ramelteon tablet 8 mg Nightly PRN    Standard Custom Day One ADULT TPN for patient WITH electrolyte abnormality or renal dysfunction (CENTRAL) Continuous    [START ON 11/21/2018] valganciclovir 50 mg/ml oral solution 450 mg Daily       Objective:     Vital Signs (Most Recent):  Temp: 96.7 °F (35.9 °C) (11/17/18 1015)  Pulse: 91 (11/17/18 1030)  Resp: 18 (11/17/18 1030)  BP: 110/73 (11/17/18 1000)  SpO2: 100 % (11/17/18 1030)  O2 Device (Oxygen Therapy): ventilator  (11/17/18 1000) Vital Signs (24h Range):  Temp:  [95 °F (35 °C)-98.3 °F (36.8 °C)] 96.7 °F (35.9 °C)  Pulse:  [] 91  Resp:  [0-29] 18  SpO2:  [93 %-100 %] 100 %  BP: ()/(54-78) 110/73  Arterial Line BP: ()/(35-78) 142/74     Weight: 98.1 kg (216 lb 4.3 oz) (11/15/18 0500)  Body mass index is 31.03 kg/m².  Body surface area is 2.2 meters squared.    I/O last 3 completed shifts:  In: 20695.3 [I.V.:7849.3; Blood:2456]  Out: 7186 [Drains:120; Other:6966; Blood:100]    Physical Exam   Constitutional: He appears well-developed. He appears cachectic. He is cooperative. No distress. He is sedated and intubated.   Pleasantly disoriented but improved from yesterday.    HENT:   Head: Normocephalic and atraumatic.   Eyes: Conjunctivae are normal. Pupils are equal, round, and reactive to light.   Neck: Trachea normal. Neck supple. No JVD present.   Cardiovascular: Normal rate, regular rhythm, S1 normal, S2 normal and normal pulses. Exam reveals no gallop and no friction rub.   No murmur heard.  Pulmonary/Chest: Effort normal. He is intubated. He has decreased breath sounds in the right lower field and the left lower field. He has no rhonchi.   Abdominal: Soft. He exhibits distension. He exhibits no ascites. Bowel sounds are decreased. There is no tenderness.       Musculoskeletal: Normal range of motion. He exhibits edema (edema+ trace pitting sacral).   Neurological:   Pleasantly disoriented no focal deficit.    Skin: Skin is warm and dry. Capillary refill takes less than 2 seconds.   Psychiatric: He has a normal mood and affect. His behavior is normal.   Vitals reviewed.      Significant Labs:  ABGs:   Recent Labs   Lab 11/17/18  0817   PH 7.318*   PCO2 53.4*   HCO3 27.4   POCSATURATED 89*   BE 1     BMP:   Recent Labs   Lab 11/17/18  0406 11/17/18  0800   *  150* 177*     108 106   CO2 24  24 25   BUN 11  11 12   CREATININE 1.0  1.0 1.2   CALCIUM 7.7*  7.7* 8.2*   MG 1.7  --      Cardiac  Markers: No results for input(s): CKMB, TROPONINT, MYOGLOBIN in the last 168 hours.  CBC:   Recent Labs   Lab 11/17/18  0800   WBC 29.12*   RBC 3.10*   HGB 9.3*   HCT 27.4*   PLT 74*   MCV 88   MCH 30.0   MCHC 33.9     CMP:   Recent Labs   Lab 11/17/18  0800   *   CALCIUM 8.2*   ALBUMIN 3.3*   PROT 4.4*      K 4.9   CO2 25      BUN 12   CREATININE 1.2   ALKPHOS 119   *   AST 79*   BILITOT 15.0*     Coagulation:   Recent Labs   Lab 11/17/18  0406 11/17/18  0800   INR 1.4* 1.3*   APTT 30.1  --      No results for input(s): COLORU, CLARITYU, SPECGRAV, PHUR, PROTEINUA, GLUCOSEU, BILIRUBINCON, BLOODU, WBCU, RBCU, BACTERIA, MUCUS, NITRITE, LEUKOCYTESUR, UROBILINOGEN, HYALINECASTS in the last 168 hours.  All labs within the past 24 hours have been reviewed.     Significant Imaging:  Labs: Reviewed  US: Reviewed

## 2018-11-17 NOTE — PROGRESS NOTES
Pt transferred from OR to [facility] via bed and remains intubated. Transferred with anesthesia team on portable monitor and 100% O2 via ambu. Levo gtt infusing at 0.08 mcg/kg/min. Wound vac noted to abdomen and RLQ JOSE A drain in place. Serial labs collected. Vitals stable at this time. Dr. Shi at bedside.

## 2018-11-17 NOTE — SUBJECTIVE & OBJECTIVE
Interval History:   Hg kept dropping and he was taken to OR x 2 for wash out. He tolerated SLED overnight remains anuric. He return from OR on Norepi and intubated.     Review of patient's allergies indicates:   Allergen Reactions    Penicillins Nausea And Vomiting and Rash     Current Facility-Administered Medications   Medication Frequency    0.9%  NaCl infusion (CRRT USE ONLY) Continuous    0.9%  NaCl infusion (CRRT USE ONLY) Continuous    0.9%  NaCl infusion (for blood administration) Q24H PRN    ciprofloxacin (CIPRO)400mg/200ml D5W IVPB 400 mg On Call Procedure    dextrose 50% injection 12.5 g PRN    famotidine (PF) injection 20 mg Daily    fentaNYL injection 25 mcg Q1H PRN    fluconazole (DIFLUCAN) IVPB 200 mg 100 mL Q24H    glucagon (human recombinant) injection 1 mg PRN    heparin (porcine) injection 5,000 Units Q8H    insulin aspart U-100 pen 0-5 Units Q6H PRN    linezolid 600mg/300ml 600 mg/300 mL IVPB 600 mg On Call Procedure    magnesium sulfate 2g in water 50mL IVPB (premix) PRN    magnesium sulfate 2g in water 50mL IVPB (premix) PRN    methylPREDNISolone sodium succinate injection 20 mg Q12H    Followed by    [START ON 11/17/2018] predniSONE tablet 20 mg Daily    mycophenolate (CELLCEPT) 1,000 mg in dextrose 5 % 250 mL IVPB Daily    norepinephrine 4 mg in dextrose 5% 250 mL infusion (premix) (titrating) Continuous    oxyCODONE immediate release tablet 5 mg Q6H PRN    oxyCODONE immediate release tablet Tab 10 mg Q4H PRN    phenylephrine HCl in 0.9% NaCl 1 mg/10 mL (100 mcg/mL) syringe     propofol (DIPRIVAN) 10 mg/mL infusion Continuous    ramelteon tablet 8 mg Nightly PRN    [START ON 11/17/2018] Standard Custom Day One ADULT TPN for patient WITH electrolyte abnormality or renal dysfunction (CENTRAL) Continuous    tacrolimus capsule 1 mg BID    [START ON 11/21/2018] valganciclovir 50 mg/ml oral solution 450 mg Daily       Objective:     Vital Signs (Most Recent):  Temp: 97.8  °F (36.6 °C) (11/16/18 1500)  Pulse: 107 (11/16/18 1545)  Resp: (!) 23 (11/16/18 1545)  BP: (!) 91/56 (11/16/18 1500)  SpO2: 100 % (11/16/18 1545)  O2 Device (Oxygen Therapy): room air (11/16/18 1500) Vital Signs (24h Range):  Temp:  [97.7 °F (36.5 °C)-98.6 °F (37 °C)] 97.8 °F (36.6 °C)  Pulse:  [] 107  Resp:  [11-39] 23  SpO2:  [95 %-100 %] 100 %  BP: ()/(56-81) 91/56  Arterial Line BP: ()/(35-81) 106/58     Weight: 98.1 kg (216 lb 4.3 oz) (11/15/18 0500)  Body mass index is 31.03 kg/m².  Body surface area is 2.2 meters squared.    I/O last 3 completed shifts:  In: 81417.3 [I.V.:50547.3; Blood:2463]  Out: 7798 [Urine:5; Drains:120; Other:7573; Blood:100]    Physical Exam   Constitutional: He appears well-developed. He appears cachectic. He is cooperative. No distress. He is sedated and intubated.   Pleasantly disoriented but improved from yesterday.    HENT:   Head: Normocephalic and atraumatic.   Eyes: Conjunctivae are normal. Pupils are equal, round, and reactive to light.   Neck: Trachea normal. Neck supple. No JVD present.   Cardiovascular: Normal rate, regular rhythm, S1 normal, S2 normal and normal pulses. Exam reveals no gallop and no friction rub.   No murmur heard.  Pulmonary/Chest: Effort normal. He is intubated. He has decreased breath sounds in the right lower field and the left lower field. He has no rhonchi.   Abdominal: Soft. He exhibits distension. He exhibits no ascites. Bowel sounds are decreased. There is no tenderness.       Musculoskeletal: Normal range of motion. He exhibits edema (edema+ trace pitting sacral).   Neurological:   Pleasantly disoriented no focal deficit.    Skin: Skin is warm and dry. Capillary refill takes less than 2 seconds.   Psychiatric: He has a normal mood and affect. His behavior is normal.   Vitals reviewed.      Significant Labs:  ABGs:   Recent Labs   Lab 11/16/18 1842   PH 7.289*   PCO2 48.2*   HCO3 23.2*   POCSATURATED 100   BE -3     BMP:    Recent Labs   Lab 11/16/18  1506 11/16/18  1845   * 186*    110   CO2 25 21*   BUN 12 13   CREATININE 0.9 0.8   CALCIUM 7.7* 8.3*   MG 2.0  2.0  --      Cardiac Markers: No results for input(s): CKMB, TROPONINT, MYOGLOBIN in the last 168 hours.  CBC:   Recent Labs   Lab 11/16/18  1845   WBC 22.74*   RBC 3.12*   HGB 9.4*   HCT 28.4*   PLT 83*   MCV 91   MCH 30.1   MCHC 33.1     CMP:   Recent Labs   Lab 11/16/18  1845   *   CALCIUM 8.3*   ALBUMIN 2.0*   PROT 3.1*      K 4.8   CO2 21*      BUN 13   CREATININE 0.8   ALKPHOS 120   *   *   BILITOT 9.3*     Coagulation:   Recent Labs   Lab 11/16/18  0320  11/16/18  1845   INR 1.1   < > 1.4*   APTT 26.0  --   --     < > = values in this interval not displayed.     No results for input(s): COLORU, CLARITYU, SPECGRAV, PHUR, PROTEINUA, GLUCOSEU, BILIRUBINCON, BLOODU, WBCU, RBCU, BACTERIA, MUCUS, NITRITE, LEUKOCYTESUR, UROBILINOGEN, HYALINECASTS in the last 168 hours.  All labs within the past 24 hours have been reviewed.     Significant Imaging:  Labs: Reviewed  US: Reviewed

## 2018-11-17 NOTE — SUBJECTIVE & OBJECTIVE
"Interval HPI:   Overnight events: remains in ICU. BG at or slightly above goal. Prednisone 20 mg daily. OR yesterday for bleeding. Remains intubated and sedated.   Eating:   NPO  Nausea: No  Hypoglycemia and intervention: No  Fever: No  TPN and/or TF: No    /60   Pulse 101   Temp 97.9 °F (36.6 °C) (Oral)   Resp 15   Ht 5' 10" (1.778 m)   Wt 98.1 kg (216 lb 4.3 oz)   SpO2 99%   BMI 31.03 kg/m²     Labs Reviewed and Include    Recent Labs   Lab 11/17/18  0406   *  150*   CALCIUM 7.7*  7.7*   ALBUMIN 2.7*  2.7*   PROT 3.9*     139   K 4.7  4.7   CO2 24  24     108   BUN 11  11   CREATININE 1.0  1.0   ALKPHOS 130   *   AST 93*   BILITOT 15.1*     Lab Results   Component Value Date    WBC 24.89 (H) 11/17/2018    HGB 9.7 (L) 11/17/2018    HCT 29.4 (L) 11/17/2018    MCV 90 11/17/2018    PLT 71 (L) 11/17/2018     No results for input(s): TSH, FREET4 in the last 168 hours.  Lab Results   Component Value Date    HGBA1C 4.4 11/09/2018       Nutritional status:   Body mass index is 31.03 kg/m².  Lab Results   Component Value Date    ALBUMIN 2.7 (L) 11/17/2018    ALBUMIN 2.7 (L) 11/17/2018    ALBUMIN 2.6 (L) 11/16/2018     Lab Results   Component Value Date    PREALBUMIN 7 (L) 10/27/2018       Estimated Creatinine Clearance: 100.3 mL/min (based on SCr of 1 mg/dL).    Accu-Checks  Recent Labs     11/15/18  1155 11/15/18  1717 11/15/18  2037 11/16/18  0010 11/16/18  0435 11/16/18  0709 11/16/18  1428 11/16/18  1507 11/17/18  0001 11/17/18  0405   POCTGLUCOSE 148* 117* 110 103 117* 101 191* 238* 147* 151*       Current Medications and/or Treatments Impacting Glycemic Control  Immunotherapy:    Immunosuppressants         Stop Route Frequency     tacrolimus (PROGRAF) 1 mg/mL oral syringe      -- Oral 2 times daily        Steroids:   Hormones (From admission, onward)    Start     Stop Route Frequency Ordered    11/17/18 0900  predniSONE tablet 20 mg  (methylprednisolone taper panel)      " -- Oral Daily 11/11/18 1208    11/09/18 1345  methylPREDNISolone sodium succinate injection 500 mg  (Med - Immunosuppression Induction Therapy (Methylprednisolone))      11/10 0144 IV Once pre-op 11/09/18 1236        Pressors:    Autonomic Drugs (From admission, onward)    Start     Stop Route Frequency Ordered    11/16/18 1615  norepinephrine 4 mg in dextrose 5% 250 mL infusion (premix) (titrating)     Question Answer Comment   Titrate by: (in mcg/kg/min) 5    Titrate interval: (in minutes) 15    Titrate to maintain: (MAP or SBP) SBP    Greater than: (in mmHg) 100    Maximum dose: (in mcg/kg/min) 15        -- IV Continuous 11/16/18 1509        Hyperglycemia/Diabetes Medications:   Antihyperglycemics (From admission, onward)    Start     Stop Route Frequency Ordered    11/16/18 0826  insulin aspart U-100 pen 0-5 Units      -- SubQ Every 6 hours PRN 11/16/18 0798

## 2018-11-17 NOTE — ASSESSMENT & PLAN NOTE
Plan:     1. For now keep on zyvox for previous VRE urinary infection, cipro and diflucan.   2.  Repeat WBC already coming down - leucocytosis likely due to recent surgeries and perhaps not an infectious process    ID will follow with you and will ask Dr. Ireland for advice over long-term fungal/mold prophylaxis

## 2018-11-17 NOTE — PROGRESS NOTES
Pt stable overnight. Temperature afebrile. HR = 100-110. SBP maintained >100mmHg with Levophed. Pt. remains anuric. CRRT restarted during shift with UF of 200. Mechanical ventilation remains in use. Propofol infusion ordered for sedation. 750 cc albumin given for volume. Negative pressure wound vacuum initiated with sanguineous output (100-150cc/hr.) Pt currently appears asleep, free of injury. Please see flowsheet data for full assessment details.

## 2018-11-17 NOTE — ANESTHESIA POSTPROCEDURE EVALUATION
"Anesthesia Post Evaluation    Patient: Femi Enciso    Procedure(s) Performed: Procedure(s) (LRB):  LAPAROTOMY, EXPLORATORY (N/A)    Final Anesthesia Type: general  Patient location during evaluation: ICU  Patient participation: No - Unable to Participate, Intubation  Level of consciousness: sedated  Post-procedure vital signs: reviewed and stable  Pain management: adequate  Airway patency: patent  PONV status at discharge: No PONV  Anesthetic complications: no      Cardiovascular status: hemodynamically stable  Respiratory status: spontaneous ventilation, ETT, intubated and ventilator  Hydration status: euvolemic  Follow-up not needed.        Visit Vitals  BP (!) 91/56 (BP Location: Left arm, Patient Position: Lying)   Pulse 107   Temp 36.6 °C (97.8 °F) (Oral)   Resp (!) 23   Ht 5' 10" (1.778 m)   Wt 98.1 kg (216 lb 4.3 oz)   SpO2 100%   BMI 31.03 kg/m²       Pain/Torrey Score: Pain Assessment Performed: Yes (11/16/2018  7:15 AM)  Presence of Pain: denies (11/16/2018  7:15 AM)        "

## 2018-11-17 NOTE — ASSESSMENT & PLAN NOTE
Managed per LTS.   avoid hypoglycemia  Optimize BG control for surgical wound healing.     Lab Results   Component Value Date     (H) 11/17/2018    AST 93 (H) 11/17/2018     (H) 11/17/2018    ALKPHOS 130 11/17/2018    BILITOT 15.1 (H) 11/17/2018

## 2018-11-17 NOTE — PROGRESS NOTES
Notified Dr. Lorenzo about pt's hypotension with SBP 80s-90s, MAP 60s, and tachycardic 110s-120s. STAT EKG and labs currently collected and awaiting results. EKG results reviewed with MD. Ordered for 250 cc albumin at this time.

## 2018-11-17 NOTE — ASSESSMENT & PLAN NOTE
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 POD#4    Neuro  -acute change in mental status with confusion and agitation on 11/14, controlled with PRN ativan   -monitor neuro exam when sedation weaned, currently sedated with propofol    CV  -required levo overnight, off pressors this morning   -swan tricia catheter removed  -monitor on telemetry     Resp  -left intubated following OR yesterday given open abdomen with planned take-back, minimal vent settings  -Vent Mode: A/C  Oxygen Concentration (%):  [39-99] 40  Resp Rate Total:  [16 br/min-37 br/min] 26 br/min  Vt Set:  [320 mL] 320 mL  PEEP/CPAP:  [5 cmH20-6 cmH20] 6 cmH20  Mean Airway Pressure:  [7.8 ctT98-47 cmH20] 9.8 cmH20  -ABGs prn  -daily CXR while intubated   -continue to monitor O2 sats   -CXR this AM with white-out of right lung, ETT appears deep, withdraw 2cm and repeat CXR with improvement    Heme  -s/p transfusion of multiple products yesterday: 7 pRBCs, 3 plts, 1FFP; H/H down to 9.7 this morning, continue to monitor q4  -INR 1.4, will transfuse 1 FFP this AM  -q4 CBCs, transfuse products as necessary    ID  -monitor fever and WBC   -f/u cultures   -Abx: Cipro, fluconazole and linezolid     MSK  -critical nature of patient prohibits OOBTC or ambulation     FEN/GI  -replace lytes   -Continue NPO for now  -s/p ex-lap yesterday requiring take-back for bleeding, abdomen packed and left open with abthera and plans for take-back likely tomorrow  - CT abdomen/pelvis (non-contrast) today      Endocrine  -insulin as needed. Monitor BG       -CRRT per renal, would like to increase UF of CRRT today     dispo  -continue ICU care, will plan for take-back to OR possibly tomorrow

## 2018-11-17 NOTE — PROGRESS NOTES
Pt transferred to OR for exlap with RN and CRNA via bed and on portable monitor. !00% O2 via 3L NC. Levo gtt infusing and 1U PRBCs infusing. Vitals stable at this time. Mother at bedside and updated on plan of care.

## 2018-11-17 NOTE — PROGRESS NOTES
[facility]  Liver Transplant  Progress Note    Patient Name: Femi Enciso  MRN: [id number]  Admission Date: 10/27/2018  Hospital Length of Stay: 21 days  Code Status: Full Code  Primary Care Provider: Primary Doctor No  Post-Op Day 1 Day Post-Op    ORGAN:   LIVER  Disease Etiology: Alcoholic Cirrhosis  Donor Type:    - Brain Death  CDC High Risk:   No  Donor CMV Status:   Donor CMV Status: Positive  Donor HBcAB:   Negative  Donor HCV Status:   Negative  Donor HBV JAVIER: Negative  Donor HCV JAVIER: Negative  Whole or Partial: Whole Liver  Biliary Anastomosis: End to End  Arterial Anatomy: Standard    Subjective:     Scheduled Meds:   albumin human 25%  25 g Intravenous Q8H    ciprofloxacin (CIPRO)400mg/200ml D5W IVPB  400 mg Intravenous Q12H    famotidine (PF)  20 mg Intravenous Daily    fluconazole (DIFLUCAN) IVPB  200 mg Intravenous Q24H    heparin (porcine)  5,000 Units Subcutaneous Q8H    hydrocortisone sodium succinate  50 mg Intravenous Q8H    linezolid 600mg/300ml  600 mg Intravenous Q12H    [START ON 2018] valganciclovir 50 mg/ml  450 mg Per NG tube Daily     Continuous Infusions:   sodium chloride 0.9%      norepinephrine bitartrate-D5W Stopped (18 1100)    propofol 40 mcg/kg/min (18 1600)    TPN ADULT CENTRAL LINE CUSTOM       PRN Meds:sodium chloride, sodium chloride, sodium chloride, dextrose 50%, fentaNYL, glucagon (human recombinant), insulin aspart U-100, magnesium sulfate IVPB, oxyCODONE, oxyCODONE, ramelteon    Review of Systems   Unable to perform ROS: Intubated     Objective:     Vital Signs (Most Recent):  Temp: 97.2 °F (36.2 °C) (18 1515)  Pulse: 89 (18 1600)  Resp: 10 (18 1600)  BP: 95/63 (18 1530)  SpO2: 100 % (18 1600) Vital Signs (24h Range):  Temp:  [95 °F (35 °C)-98.3 °F (36.8 °C)] 97.2 °F (36.2 °C)  Pulse:  [] 89  Resp:  [0-22] 10  SpO2:  [93 %-100 %] 100 %  BP: ()/(54-77) 95/63  Arterial Line BP:  ()/(47-78) 110/60     Weight: 98.1 kg (216 lb 4.3 oz)  Body mass index is 31.03 kg/m².    Intake/Output - Last 3 Shifts       11/15 0700 - 11/16 0659 11/16 0700 - 11/17 0659 11/17 0700 - 11/18 0659    I.V. (mL/kg) 4978.7 (50.8) 7797.6 (79.5)     Blood 257 2456 218    NG/GT   60    IV Piggyback       Total Intake(mL/kg) 5235.7 (53.4) 20131.6 (104.5) 278 (2.8)    Urine (mL/kg/hr) 10 (0)  5 (0)    Drains  120 0    Other 7196 4223 3452    Stool 0      Blood  100     Total Output 7206 4443 3457    Net -1970.3 +5810.6 -3179           Stool Occurrence 1 x            Physical Exam   Constitutional: He appears well-developed.   jaundiced   HENT:   Head: Normocephalic and atraumatic.   Eyes: Scleral icterus is present.   Cardiovascular: Normal rate, regular rhythm and intact distal pulses.   Pulmonary/Chest: Effort normal. No respiratory distress.   On vent   Vent Mode: A/C  Oxygen Concentration (%):  (40-99) 40  Resp Rate Total:  (16 br/min-37 br/min) 17 br/min  Vt Set:  (320 mL) 320 mL  PEEP/CPAP:  (5 cmH20) 5 cmH20  Mean Airway Pressure:  (7.8 cmH20-10 cmH20) 8.6 cmH20     Abdominal: Soft. He exhibits no distension.   athera blue sponge wound vac with 1 JOSE A drain in place with no output, wound vac putting out brighter SS output in the 100s/H/    Musculoskeletal: He exhibits edema.   Neurological:   Sedated on propofol   Skin: Skin is warm and dry.   Jaundice improving       Laboratory:  Immunosuppressants     None        Labs within the past 24 hours have been reviewed.    Diagnostic Results:  I have personally reviewed all pertinent imaging studies.    Assessment/Plan:   Femi Enciso is a 53 y.o. male                                       GI   * S/P liver transplant      53 year old male with history of ESLD 2/2 ETOH cirrhosis who is s/p liver transplant. POD#4    Neuro  -acute change in mental status with confusion and agitation on 11/14, controlled with PRN ativan   -monitor neuro exam when sedation weaned, currently  sedated with propofol    CV  -required levo overnight, off pressors this morning   -swan tricia catheter removed  -monitor on telemetry     Resp  -left intubated following OR yesterday given open abdomen with planned take-back, minimal vent settings  -Vent Mode: A/C  Oxygen Concentration (%):  [39-99] 40  Resp Rate Total:  [16 br/min-37 br/min] 26 br/min  Vt Set:  [320 mL] 320 mL  PEEP/CPAP:  [5 cmH20-6 cmH20] 6 cmH20  Mean Airway Pressure:  [7.8 dmS12-48 cmH20] 9.8 cmH20  -ABGs prn  -daily CXR while intubated   -continue to monitor O2 sats   -CXR this AM with white-out of right lung, ETT appears deep, withdraw 2cm and repeat CXR with improvement    Heme  -s/p transfusion of multiple products yesterday: 7 pRBCs, 3 plts, 1FFP; H/H down to 9.7 this morning, continue to monitor q4  -INR 1.4, will transfuse 1 FFP this AM  -q4 CBCs, transfuse products as necessary    ID  -monitor fever and WBC   -f/u cultures   -Abx: Cipro, fluconazole and linezolid     MSK  -critical nature of patient prohibits OOBTC or ambulation     FEN/GI  -replace lytes   -Continue NPO for now  -s/p ex-lap yesterday requiring take-back for bleeding, abdomen packed and left open with abthera and plans for take-back likely tomorrow  - CT abdomen/pelvis (non-contrast) today      Endocrine  -insulin as needed. Monitor BG       -CRRT per renal, would like to increase UF of CRRT today     dispo  -continue ICU care, will plan for take-back to OR possibly tomorrow                The patients clinical status was discussed at multidisplinary rounds, involving transplant surgery, transplant medicine, pharmacy, nursing, nutrition, and social work.    Crys Flores MD  Liver Transplant  [facility]

## 2018-11-17 NOTE — SUBJECTIVE & OBJECTIVE
Past Medical History:   Diagnosis Date    Liver cirrhosis, alcoholic 09/30/2018       Past Surgical History:   Procedure Laterality Date    EGD (ESOPHAGOGASTRODUODENOSCOPY) N/A 11/6/2018    Performed by Duarte Jules MD at [facility] ENDO (2ND FLR)    ESOPHAGOGASTRODUODENOSCOPY N/A 11/6/2018    Procedure: EGD (ESOPHAGOGASTRODUODENOSCOPY);  Surgeon: Duarte Jules MD;  Location: [facility] (2ND FLR);  Service: Endoscopy;  Laterality: N/A;    INSERTION OF TUNNELED CENTRAL VENOUS HEMODIALYSIS CATHETER N/A 11/7/2018    Procedure: INSERTION, CATHETER, CENTRAL VENOUS, HEMODIALYSIS, TUNNELED;  Surgeon: Brock Gonzalez MD;  Location: [facility] CATH LAB;  Service: Nephrology;  Laterality: N/A;    INSERTION, CATHETER, CENTRAL VENOUS, HEMODIALYSIS, TUNNELED N/A 11/7/2018    Performed by Brock Gonzalez MD at [facility] CATH LAB    LIVER TRANSPLANT N/A 11/11/2018    Procedure: TRANSPLANT, LIVER;  Surgeon: Shmuel Leary MD;  Location: [facility] OR [address] [place] [address];  Service: Transplant;  Laterality: N/A;    TRANSPLANT, LIVER N/A 11/11/2018    Performed by Shmuel Leary MD at [facility] OR [address] [place] [address]       Review of patient's allergies indicates:   Allergen Reactions    Cefepime Rash    Penicillins Nausea And Vomiting and Rash     Full body rash from cefepime as well       Medications:  Medications Prior to Admission   Medication Sig    calcium carbonate/vitamin D3 (VITAMIN D-3 ORAL) Take 1 tablet by mouth once daily.    cyanocobalamin (VITAMIN B-12) 100 MCG tablet Take 100 mcg by mouth once daily.    folic acid (FOLVITE) 1 MG tablet Take 1 mg by mouth once daily.    lactulose (CHRONULAC) 10 gram/15 mL solution Take 20 g by mouth 3 (three) times daily.    multivitamin (THERAGRAN) per tablet Take 1 tablet by mouth once daily.    omeprazole (PRILOSEC) 40 MG capsule Take 40 mg by mouth once daily.    ondansetron (ZOFRAN) 4 MG tablet Take 4 mg by mouth daily as needed for Nausea.    rifAXIMin (XIFAXAN) 550 mg Tab Take 550 mg by  mouth 2 (two) times daily.     Antibiotics (From admission, onward)    Start     Stop Route Frequency Ordered    11/17/18 0830  linezolid 600mg/300ml 600 mg/300 mL IVPB 600 mg      -- IV Every 12 hours (non-standard times) 11/17/18 0724    11/17/18 0830  ciprofloxacin (CIPRO)400mg/200ml D5W IVPB 400 mg      -- IV Every 12 hours (non-standard times) 11/17/18 0724        Antifungals (From admission, onward)    Start     Stop Route Frequency Ordered    11/11/18 1230  fluconazole (DIFLUCAN) IVPB 200 mg 100 mL      -- IV Every 24 hours (non-standard times) 11/11/18 1208        Antivirals (From admission, onward)        Stop Route Frequency     valganciclovir 50 mg/ml      -- PER NG TUBE Daily           Immunization History   Administered Date(s) Administered    Hepatitis A, Pediatric/Adolescent, 2 Dose 11/07/2018    Influenza - Quadrivalent - PF 10/04/2018    PPD Test 11/01/2018    Pneumococcal Conjugate - 13 Valent 11/06/2018       Family History     None        Social History     Socioeconomic History    Marital status:      Spouse name: None    Number of children: None    Years of education: None    Highest education level: None   Social Needs    Financial resource strain: None    Food insecurity - worry: None    Food insecurity - inability: None    Transportation needs - medical: None    Transportation needs - non-medical: None   Occupational History    None   Tobacco Use    Smoking status: Never Smoker   Substance and Sexual Activity    Alcohol use: None    Drug use: None    Sexual activity: None   Other Topics Concern    None   Social History Narrative    None     Review of Systems   Unable to perform ROS: Intubated     Objective:     Vital Signs (Most Recent):  Temp: 97.2 °F (36.2 °C) (11/17/18 1515)  Pulse: 93 (11/17/18 1515)  Resp: 18 (11/17/18 1515)  BP: 97/66 (11/17/18 1500)  SpO2: 100 % (11/17/18 1515) Vital Signs (24h Range):  Temp:  [95 °F (35 °C)-98.3 °F (36.8 °C)] 97.2 °F (36.2  °C)  Pulse:  [] 93  Resp:  [0-23] 18  SpO2:  [93 %-100 %] 100 %  BP: ()/(54-77) 97/66  Arterial Line BP: ()/(47-78) 118/64     Weight: 98.1 kg (216 lb 4.3 oz)  Body mass index is 31.03 kg/m².    Estimated Creatinine Clearance: 77.1 mL/min (based on SCr of 1.3 mg/dL).    Physical Exam   Constitutional: He appears well-developed and well-nourished.   HENT:   Head: Normocephalic and atraumatic.   Eyes: Pupils are equal, round, and reactive to light.   Neck: Normal range of motion. Neck supple.   Cardiovascular: Normal rate and regular rhythm.   Pulmonary/Chest: Breath sounds normal.   Anteriorly - intubated    Abdominal: He exhibits distension.   Wound vac in place - no surrounding erythema    Genitourinary: Penis normal.   Musculoskeletal: Normal range of motion.   Neurological:   Sedated    Skin: Skin is warm and dry.   IV sites OK        Significant Labs: All pertinent labs within the past 24 hours have been reviewed.    Significant Imaging: I have reviewed all pertinent imaging results/findings within the past 24 hours.

## 2018-11-18 ENCOUNTER — ANESTHESIA EVENT (OUTPATIENT)
Dept: SURGERY | Facility: HOSPITAL | Age: 53
DRG: 005 | End: 2018-11-18
Payer: COMMERCIAL

## 2018-11-18 ENCOUNTER — ANESTHESIA (OUTPATIENT)
Dept: SURGERY | Facility: HOSPITAL | Age: 53
DRG: 005 | End: 2018-11-18
Payer: COMMERCIAL

## 2018-11-18 PROBLEM — R73.9 ACUTE HYPERGLYCEMIA: Status: RESOLVED | Noted: 2018-11-11 | Resolved: 2018-11-18

## 2018-11-18 LAB
ABO + RH BLD: NORMAL
ALBUMIN SERPL BCP-MCNC: 1.9 G/DL
ALBUMIN SERPL BCP-MCNC: 2.1 G/DL
ALBUMIN SERPL BCP-MCNC: 2.3 G/DL
ALBUMIN SERPL BCP-MCNC: 2.4 G/DL
ALBUMIN SERPL BCP-MCNC: 2.4 G/DL
ALBUMIN SERPL BCP-MCNC: 2.5 G/DL
ALBUMIN SERPL BCP-MCNC: 2.7 G/DL
ALBUMIN SERPL BCP-MCNC: 2.8 G/DL
ALLENS TEST: ABNORMAL
ALLENS TEST: ABNORMAL
ALP SERPL-CCNC: 129 U/L
ALP SERPL-CCNC: 132 U/L
ALP SERPL-CCNC: 142 U/L
ALP SERPL-CCNC: 151 U/L
ALP SERPL-CCNC: 156 U/L
ALP SERPL-CCNC: 165 U/L
ALT SERPL W/O P-5'-P-CCNC: 69 U/L
ALT SERPL W/O P-5'-P-CCNC: 81 U/L
ALT SERPL W/O P-5'-P-CCNC: 81 U/L
ALT SERPL W/O P-5'-P-CCNC: 82 U/L
ALT SERPL W/O P-5'-P-CCNC: 83 U/L
ALT SERPL W/O P-5'-P-CCNC: 85 U/L
ANION GAP SERPL CALC-SCNC: 4 MMOL/L
ANION GAP SERPL CALC-SCNC: 6 MMOL/L
ANION GAP SERPL CALC-SCNC: 7 MMOL/L
ANION GAP SERPL CALC-SCNC: 8 MMOL/L
ANION GAP SERPL CALC-SCNC: 9 MMOL/L
ANISOCYTOSIS BLD QL SMEAR: SLIGHT
APTT BLDCRRT: 31.3 SEC
AST SERPL-CCNC: 25 U/L
AST SERPL-CCNC: 30 U/L
AST SERPL-CCNC: 32 U/L
AST SERPL-CCNC: 36 U/L
AST SERPL-CCNC: 42 U/L
AST SERPL-CCNC: 53 U/L
BASO STIPL BLD QL SMEAR: ABNORMAL
BASOPHILS # BLD AUTO: 0.01 K/UL
BASOPHILS # BLD AUTO: 0.02 K/UL
BASOPHILS # BLD AUTO: 0.02 K/UL
BASOPHILS # BLD AUTO: 0.06 K/UL
BASOPHILS NFR BLD: 0 %
BASOPHILS NFR BLD: 0.1 %
BASOPHILS NFR BLD: 0.2 %
BASOPHILS NFR BLD: 0.2 %
BASOPHILS NFR BLD: 0.5 %
BILIRUB DIRECT SERPL-MCNC: 9.3 MG/DL
BILIRUB SERPL-MCNC: 10.7 MG/DL
BILIRUB SERPL-MCNC: 11.4 MG/DL
BILIRUB SERPL-MCNC: 12 MG/DL
BILIRUB SERPL-MCNC: 12.1 MG/DL
BLD GP AB SCN CELLS X3 SERPL QL: NORMAL
BLD PROD TYP BPU: NORMAL
BLOOD UNIT EXPIRATION DATE: NORMAL
BLOOD UNIT TYPE CODE: 5100
BLOOD UNIT TYPE CODE: 6200
BLOOD UNIT TYPE CODE: 9500
BLOOD UNIT TYPE CODE: 9500
BLOOD UNIT TYPE: NORMAL
BUN SERPL-MCNC: 10 MG/DL
BUN SERPL-MCNC: 11 MG/DL
BUN SERPL-MCNC: 12 MG/DL
BUN SERPL-MCNC: 13 MG/DL
BUN SERPL-MCNC: 15 MG/DL
CALCIUM SERPL-MCNC: 7.2 MG/DL
CALCIUM SERPL-MCNC: 7.4 MG/DL
CALCIUM SERPL-MCNC: 7.5 MG/DL
CALCIUM SERPL-MCNC: 7.7 MG/DL
CALCIUM SERPL-MCNC: 7.9 MG/DL
CALCIUM SERPL-MCNC: 8 MG/DL
CHLORIDE SERPL-SCNC: 105 MMOL/L
CHLORIDE SERPL-SCNC: 106 MMOL/L
CHLORIDE SERPL-SCNC: 107 MMOL/L
CHLORIDE SERPL-SCNC: 108 MMOL/L
CO2 SERPL-SCNC: 22 MMOL/L
CO2 SERPL-SCNC: 23 MMOL/L
CO2 SERPL-SCNC: 24 MMOL/L
CO2 SERPL-SCNC: 25 MMOL/L
CO2 SERPL-SCNC: 25 MMOL/L
CODING SYSTEM: NORMAL
CREAT SERPL-MCNC: 0.8 MG/DL
CREAT SERPL-MCNC: 0.9 MG/DL
CREAT SERPL-MCNC: 1 MG/DL
CREAT SERPL-MCNC: 1.1 MG/DL
CREAT SERPL-MCNC: 1.2 MG/DL
CREAT SERPL-MCNC: 1.4 MG/DL
DELSYS: ABNORMAL
DELSYS: ABNORMAL
DIFFERENTIAL METHOD: ABNORMAL
DISPENSE STATUS: NORMAL
EOSINOPHIL # BLD AUTO: 0.3 K/UL
EOSINOPHIL # BLD AUTO: 0.4 K/UL
EOSINOPHIL # BLD AUTO: 0.6 K/UL
EOSINOPHIL # BLD AUTO: 0.8 K/UL
EOSINOPHIL # BLD AUTO: 0.9 K/UL
EOSINOPHIL NFR BLD: 2.3 %
EOSINOPHIL NFR BLD: 3.1 %
EOSINOPHIL NFR BLD: 4.8 %
EOSINOPHIL NFR BLD: 6.2 %
EOSINOPHIL NFR BLD: 7.7 %
EOSINOPHIL NFR BLD: 8 %
ERYTHROCYTE [DISTWIDTH] IN BLOOD BY AUTOMATED COUNT: 15.6 %
ERYTHROCYTE [DISTWIDTH] IN BLOOD BY AUTOMATED COUNT: 15.7 %
ERYTHROCYTE [DISTWIDTH] IN BLOOD BY AUTOMATED COUNT: 16.2 %
ERYTHROCYTE [DISTWIDTH] IN BLOOD BY AUTOMATED COUNT: 16.2 %
ERYTHROCYTE [DISTWIDTH] IN BLOOD BY AUTOMATED COUNT: 16.5 %
ERYTHROCYTE [SEDIMENTATION RATE] IN BLOOD BY WESTERGREN METHOD: 16 MM/H
ERYTHROCYTE [SEDIMENTATION RATE] IN BLOOD BY WESTERGREN METHOD: 16 MM/H
EST. GFR  (AFRICAN AMERICAN): >60 ML/MIN/1.73 M^2
EST. GFR  (NON AFRICAN AMERICAN): 57 ML/MIN/1.73 M^2
EST. GFR  (NON AFRICAN AMERICAN): >60 ML/MIN/1.73 M^2
FIO2: 40
FIO2: 40
GGT SERPL-CCNC: 267 U/L
GLUCOSE SERPL-MCNC: 240 MG/DL
GLUCOSE SERPL-MCNC: 245 MG/DL
GLUCOSE SERPL-MCNC: 248 MG/DL
GLUCOSE SERPL-MCNC: 254 MG/DL
GLUCOSE SERPL-MCNC: 262 MG/DL
GLUCOSE SERPL-MCNC: 264 MG/DL
GLUCOSE SERPL-MCNC: 276 MG/DL
GLUCOSE SERPL-MCNC: 313 MG/DL
GRAM STN SPEC: NORMAL
HCO3 UR-SCNC: 26.1 MMOL/L (ref 24–28)
HCO3 UR-SCNC: 27.4 MMOL/L (ref 24–28)
HCT VFR BLD AUTO: 26.1 %
HCT VFR BLD AUTO: 27.4 %
HCT VFR BLD AUTO: 28.2 %
HCT VFR BLD AUTO: 28.5 %
HCT VFR BLD AUTO: 29.3 %
HCT VFR BLD AUTO: 31.1 %
HGB BLD-MCNC: 10.1 G/DL
HGB BLD-MCNC: 10.3 G/DL
HGB BLD-MCNC: 8.9 G/DL
HGB BLD-MCNC: 9.4 G/DL
HGB BLD-MCNC: 9.6 G/DL
HGB BLD-MCNC: 9.6 G/DL
HYPOCHROMIA BLD QL SMEAR: ABNORMAL
IMM GRANULOCYTES # BLD AUTO: 0.31 K/UL
IMM GRANULOCYTES # BLD AUTO: 0.35 K/UL
IMM GRANULOCYTES # BLD AUTO: 0.47 K/UL
IMM GRANULOCYTES # BLD AUTO: 0.57 K/UL
IMM GRANULOCYTES # BLD AUTO: 0.59 K/UL
IMM GRANULOCYTES # BLD AUTO: ABNORMAL K/UL
IMM GRANULOCYTES NFR BLD AUTO: 2.4 %
IMM GRANULOCYTES NFR BLD AUTO: 2.8 %
IMM GRANULOCYTES NFR BLD AUTO: 3.7 %
IMM GRANULOCYTES NFR BLD AUTO: 4.7 %
IMM GRANULOCYTES NFR BLD AUTO: 4.7 %
IMM GRANULOCYTES NFR BLD AUTO: ABNORMAL %
INR PPP: 1.2
INR PPP: 1.2
INR PPP: 1.3
LYMPHOCYTES # BLD AUTO: 0.4 K/UL
LYMPHOCYTES # BLD AUTO: 0.5 K/UL
LYMPHOCYTES # BLD AUTO: 0.6 K/UL
LYMPHOCYTES NFR BLD: 1 %
LYMPHOCYTES NFR BLD: 3.6 %
LYMPHOCYTES NFR BLD: 3.9 %
LYMPHOCYTES NFR BLD: 4.6 %
LYMPHOCYTES NFR BLD: 4.6 %
LYMPHOCYTES NFR BLD: 5 %
MAGNESIUM SERPL-MCNC: 1.9 MG/DL
MAGNESIUM SERPL-MCNC: 2 MG/DL
MAGNESIUM SERPL-MCNC: 2.3 MG/DL
MCH RBC QN AUTO: 29.7 PG
MCH RBC QN AUTO: 29.9 PG
MCH RBC QN AUTO: 30 PG
MCH RBC QN AUTO: 30.1 PG
MCH RBC QN AUTO: 30.1 PG
MCH RBC QN AUTO: 30.6 PG
MCHC RBC AUTO-ENTMCNC: 33.1 G/DL
MCHC RBC AUTO-ENTMCNC: 33.7 G/DL
MCHC RBC AUTO-ENTMCNC: 34 G/DL
MCHC RBC AUTO-ENTMCNC: 34.1 G/DL
MCHC RBC AUTO-ENTMCNC: 34.3 G/DL
MCHC RBC AUTO-ENTMCNC: 34.5 G/DL
MCV RBC AUTO: 87 FL
MCV RBC AUTO: 87 FL
MCV RBC AUTO: 88 FL
MCV RBC AUTO: 89 FL
MCV RBC AUTO: 91 FL
MODE: ABNORMAL
MODE: ABNORMAL
MONOCYTES # BLD AUTO: 0.4 K/UL
MONOCYTES # BLD AUTO: 0.8 K/UL
MONOCYTES # BLD AUTO: 0.9 K/UL
MONOCYTES # BLD AUTO: 1 K/UL
MONOCYTES NFR BLD: 3.2 %
MONOCYTES NFR BLD: 4 %
MONOCYTES NFR BLD: 6.2 %
MONOCYTES NFR BLD: 6.8 %
MONOCYTES NFR BLD: 7.7 %
NEUTROPHILS # BLD AUTO: 10.1 K/UL
NEUTROPHILS # BLD AUTO: 10.2 K/UL
NEUTROPHILS # BLD AUTO: 10.9 K/UL
NEUTROPHILS # BLD AUTO: 9.7 K/UL
NEUTROPHILS # BLD AUTO: 9.8 K/UL
NEUTROPHILS NFR BLD: 77.5 %
NEUTROPHILS NFR BLD: 79 %
NEUTROPHILS NFR BLD: 80.3 %
NEUTROPHILS NFR BLD: 82.2 %
NEUTROPHILS NFR BLD: 85 %
NEUTROPHILS NFR BLD: 85.1 %
NEUTS BAND NFR BLD MANUAL: 2 %
NRBC BLD-RTO: 0 /100 WBC
NUM UNITS TRANS FFP: NORMAL
OVALOCYTES BLD QL SMEAR: ABNORMAL
PCO2 BLDA: 43.2 MMHG (ref 35–45)
PCO2 BLDA: 45.7 MMHG (ref 35–45)
PEEP: 6
PEEP: 6
PH SMN: 7.37 [PH] (ref 7.35–7.45)
PH SMN: 7.41 [PH] (ref 7.35–7.45)
PHOSPHATE SERPL-MCNC: 1.3 MG/DL
PHOSPHATE SERPL-MCNC: 1.4 MG/DL
PHOSPHATE SERPL-MCNC: 1.8 MG/DL
PLATELET # BLD AUTO: 52 K/UL
PLATELET # BLD AUTO: 54 K/UL
PLATELET # BLD AUTO: 55 K/UL
PLATELET # BLD AUTO: 56 K/UL
PLATELET # BLD AUTO: 63 K/UL
PLATELET # BLD AUTO: 75 K/UL
PLATELET BLD QL SMEAR: ABNORMAL
PMV BLD AUTO: 11.2 FL
PMV BLD AUTO: 11.3 FL
PMV BLD AUTO: 11.4 FL
PMV BLD AUTO: 11.5 FL
PMV BLD AUTO: 11.7 FL
PMV BLD AUTO: 11.8 FL
PO2 BLDA: 69 MMHG (ref 80–100)
PO2 BLDA: 79 MMHG (ref 80–100)
POC BE: 1 MMOL/L
POC BE: 3 MMOL/L
POC SATURATED O2: 93 % (ref 95–100)
POC SATURATED O2: 96 % (ref 95–100)
POC TCO2: 28 MMOL/L (ref 23–27)
POC TCO2: 29 MMOL/L (ref 23–27)
POCT GLUCOSE: 211 MG/DL (ref 70–110)
POCT GLUCOSE: 235 MG/DL (ref 70–110)
POCT GLUCOSE: 241 MG/DL (ref 70–110)
POCT GLUCOSE: 255 MG/DL (ref 70–110)
POCT GLUCOSE: 256 MG/DL (ref 70–110)
POCT GLUCOSE: 305 MG/DL (ref 70–110)
POIKILOCYTOSIS BLD QL SMEAR: SLIGHT
POLYCHROMASIA BLD QL SMEAR: ABNORMAL
POTASSIUM SERPL-SCNC: 4 MMOL/L
POTASSIUM SERPL-SCNC: 4.1 MMOL/L
POTASSIUM SERPL-SCNC: 4.2 MMOL/L
POTASSIUM SERPL-SCNC: 4.4 MMOL/L
PROT SERPL-MCNC: 4 G/DL
PROT SERPL-MCNC: 4 G/DL
PROT SERPL-MCNC: 4.1 G/DL
PROT SERPL-MCNC: 4.1 G/DL
PROT SERPL-MCNC: 4.2 G/DL
PROT SERPL-MCNC: 4.2 G/DL
PROTHROMBIN TIME: 12.1 SEC
PROTHROMBIN TIME: 12.2 SEC
PROTHROMBIN TIME: 12.7 SEC
PROTHROMBIN TIME: 12.9 SEC
PROTHROMBIN TIME: 13 SEC
RBC # BLD AUTO: 3 M/UL
RBC # BLD AUTO: 3.07 M/UL
RBC # BLD AUTO: 3.19 M/UL
RBC # BLD AUTO: 3.19 M/UL
RBC # BLD AUTO: 3.38 M/UL
RBC # BLD AUTO: 3.43 M/UL
SAMPLE: ABNORMAL
SAMPLE: ABNORMAL
SITE: ABNORMAL
SITE: ABNORMAL
SODIUM SERPL-SCNC: 135 MMOL/L
SODIUM SERPL-SCNC: 136 MMOL/L
SODIUM SERPL-SCNC: 137 MMOL/L
SODIUM SERPL-SCNC: 138 MMOL/L
SODIUM SERPL-SCNC: 139 MMOL/L
SP02: 100
TACROLIMUS BLD-MCNC: 2.9 NG/ML
TRANS ERYTHROCYTES VOL PATIENT: NORMAL ML
TRANS PLATPHERESIS VOL PATIENT: NORMAL ML
VT: 320
VT: 320
WBC # BLD AUTO: 12.11 K/UL
WBC # BLD AUTO: 12.44 K/UL
WBC # BLD AUTO: 12.64 K/UL
WBC # BLD AUTO: 12.72 K/UL
WBC # BLD AUTO: 12.77 K/UL
WBC # BLD AUTO: 15.91 K/UL

## 2018-11-18 PROCEDURE — 63600175 PHARM REV CODE 636 W HCPCS: Performed by: STUDENT IN AN ORGANIZED HEALTH CARE EDUCATION/TRAINING PROGRAM

## 2018-11-18 PROCEDURE — 85027 COMPLETE CBC AUTOMATED: CPT

## 2018-11-18 PROCEDURE — 25000003 PHARM REV CODE 250: Performed by: HOSPITALIST

## 2018-11-18 PROCEDURE — 36000706: Performed by: TRANSPLANT SURGERY

## 2018-11-18 PROCEDURE — D9220A PRA ANESTHESIA: Mod: CRNA,,, | Performed by: NURSE ANESTHETIST, CERTIFIED REGISTERED

## 2018-11-18 PROCEDURE — 63600175 PHARM REV CODE 636 W HCPCS: Performed by: HOSPITALIST

## 2018-11-18 PROCEDURE — 49002 PR REOPEN RECENT ABD EXPLORATORY: ICD-10-PCS | Mod: 78,,, | Performed by: TRANSPLANT SURGERY

## 2018-11-18 PROCEDURE — 83735 ASSAY OF MAGNESIUM: CPT

## 2018-11-18 PROCEDURE — 80053 COMPREHEN METABOLIC PANEL: CPT | Mod: 91

## 2018-11-18 PROCEDURE — A4217 STERILE WATER/SALINE, 500 ML: HCPCS | Performed by: PHYSICIAN ASSISTANT

## 2018-11-18 PROCEDURE — 99232 SBSQ HOSP IP/OBS MODERATE 35: CPT | Mod: ,,, | Performed by: NURSE PRACTITIONER

## 2018-11-18 PROCEDURE — 82803 BLOOD GASES ANY COMBINATION: CPT

## 2018-11-18 PROCEDURE — 87186 SC STD MICRODIL/AGAR DIL: CPT | Mod: 59

## 2018-11-18 PROCEDURE — 87075 CULTR BACTERIA EXCEPT BLOOD: CPT

## 2018-11-18 PROCEDURE — 80053 COMPREHEN METABOLIC PANEL: CPT

## 2018-11-18 PROCEDURE — 85730 THROMBOPLASTIN TIME PARTIAL: CPT

## 2018-11-18 PROCEDURE — 25000003 PHARM REV CODE 250: Performed by: TRANSPLANT SURGERY

## 2018-11-18 PROCEDURE — 94003 VENT MGMT INPAT SUBQ DAY: CPT

## 2018-11-18 PROCEDURE — 99900026 HC AIRWAY MAINTENANCE (STAT)

## 2018-11-18 PROCEDURE — 87015 SPECIMEN INFECT AGNT CONCNTJ: CPT

## 2018-11-18 PROCEDURE — 85007 BL SMEAR W/DIFF WBC COUNT: CPT

## 2018-11-18 PROCEDURE — 49002 REOPENING OF ABDOMEN: CPT | Mod: 78,82,, | Performed by: TRANSPLANT SURGERY

## 2018-11-18 PROCEDURE — 90945 DIALYSIS ONE EVALUATION: CPT | Mod: ,,, | Performed by: INTERNAL MEDICINE

## 2018-11-18 PROCEDURE — 25000003 PHARM REV CODE 250: Performed by: NURSE PRACTITIONER

## 2018-11-18 PROCEDURE — 83735 ASSAY OF MAGNESIUM: CPT | Mod: 91

## 2018-11-18 PROCEDURE — 99232 PR SUBSEQUENT HOSPITAL CARE,LEVL II: ICD-10-PCS | Mod: ,,, | Performed by: INTERNAL MEDICINE

## 2018-11-18 PROCEDURE — 87205 SMEAR GRAM STAIN: CPT

## 2018-11-18 PROCEDURE — 80197 ASSAY OF TACROLIMUS: CPT

## 2018-11-18 PROCEDURE — 20000000 HC ICU ROOM

## 2018-11-18 PROCEDURE — 99900035 HC TECH TIME PER 15 MIN (STAT)

## 2018-11-18 PROCEDURE — 82977 ASSAY OF GGT: CPT

## 2018-11-18 PROCEDURE — 90945 DIALYSIS ONE EVALUATION: CPT

## 2018-11-18 PROCEDURE — P9034 PLATELETS, PHERESIS: HCPCS

## 2018-11-18 PROCEDURE — 80100008 HC CRRT DAILY MAINTENANCE

## 2018-11-18 PROCEDURE — 63600175 PHARM REV CODE 636 W HCPCS: Performed by: NURSE PRACTITIONER

## 2018-11-18 PROCEDURE — 63600175 PHARM REV CODE 636 W HCPCS: Mod: JG | Performed by: PHYSICIAN ASSISTANT

## 2018-11-18 PROCEDURE — 99232 PR SUBSEQUENT HOSPITAL CARE,LEVL II: ICD-10-PCS | Mod: 24,,, | Performed by: SURGERY

## 2018-11-18 PROCEDURE — 49002 REOPENING OF ABDOMEN: CPT | Mod: 78,,, | Performed by: TRANSPLANT SURGERY

## 2018-11-18 PROCEDURE — S0028 INJECTION, FAMOTIDINE, 20 MG: HCPCS | Performed by: TRANSPLANT SURGERY

## 2018-11-18 PROCEDURE — 99232 PR SUBSEQUENT HOSPITAL CARE,LEVL II: ICD-10-PCS | Mod: ,,, | Performed by: NURSE PRACTITIONER

## 2018-11-18 PROCEDURE — 94761 N-INVAS EAR/PLS OXIMETRY MLT: CPT

## 2018-11-18 PROCEDURE — 49002 PR REOPEN RECENT ABD EXPLORATORY: ICD-10-PCS | Mod: 78,82,, | Performed by: TRANSPLANT SURGERY

## 2018-11-18 PROCEDURE — 85610 PROTHROMBIN TIME: CPT | Mod: 91

## 2018-11-18 PROCEDURE — 87206 SMEAR FLUORESCENT/ACID STAI: CPT

## 2018-11-18 PROCEDURE — 25000003 PHARM REV CODE 250: Performed by: NURSE ANESTHETIST, CERTIFIED REGISTERED

## 2018-11-18 PROCEDURE — 90945 PR DIALYSIS, NOT HEMO, 1 EVAL: ICD-10-PCS | Mod: ,,, | Performed by: INTERNAL MEDICINE

## 2018-11-18 PROCEDURE — 80069 RENAL FUNCTION PANEL: CPT | Mod: 91

## 2018-11-18 PROCEDURE — 63600175 PHARM REV CODE 636 W HCPCS: Performed by: TRANSPLANT SURGERY

## 2018-11-18 PROCEDURE — 36000707: Performed by: TRANSPLANT SURGERY

## 2018-11-18 PROCEDURE — 87102 FUNGUS ISOLATION CULTURE: CPT

## 2018-11-18 PROCEDURE — 85025 COMPLETE CBC W/AUTO DIFF WBC: CPT

## 2018-11-18 PROCEDURE — 82248 BILIRUBIN DIRECT: CPT

## 2018-11-18 PROCEDURE — 37000009 HC ANESTHESIA EA ADD 15 MINS: Performed by: TRANSPLANT SURGERY

## 2018-11-18 PROCEDURE — 87116 MYCOBACTERIA CULTURE: CPT | Mod: 59

## 2018-11-18 PROCEDURE — 37000008 HC ANESTHESIA 1ST 15 MINUTES: Performed by: TRANSPLANT SURGERY

## 2018-11-18 PROCEDURE — 37799 UNLISTED PX VASCULAR SURGERY: CPT

## 2018-11-18 PROCEDURE — 25000003 PHARM REV CODE 250: Performed by: PHYSICIAN ASSISTANT

## 2018-11-18 PROCEDURE — 63600175 PHARM REV CODE 636 W HCPCS: Performed by: PEDIATRICS

## 2018-11-18 PROCEDURE — 99232 SBSQ HOSP IP/OBS MODERATE 35: CPT | Mod: 24,,, | Performed by: SURGERY

## 2018-11-18 PROCEDURE — 27000221 HC OXYGEN, UP TO 24 HOURS

## 2018-11-18 PROCEDURE — 99232 SBSQ HOSP IP/OBS MODERATE 35: CPT | Mod: ,,, | Performed by: INTERNAL MEDICINE

## 2018-11-18 PROCEDURE — 27201423 OPTIME MED/SURG SUP & DEVICES STERILE SUPPLY: Performed by: TRANSPLANT SURGERY

## 2018-11-18 PROCEDURE — 87077 CULTURE AEROBIC IDENTIFY: CPT

## 2018-11-18 PROCEDURE — D9220A PRA ANESTHESIA: Mod: ANES,,, | Performed by: ANESTHESIOLOGY

## 2018-11-18 PROCEDURE — 63600175 PHARM REV CODE 636 W HCPCS: Performed by: NURSE ANESTHETIST, CERTIFIED REGISTERED

## 2018-11-18 PROCEDURE — 87070 CULTURE OTHR SPECIMN AEROBIC: CPT

## 2018-11-18 PROCEDURE — 80069 RENAL FUNCTION PANEL: CPT

## 2018-11-18 RX ORDER — HYDROCODONE BITARTRATE AND ACETAMINOPHEN 500; 5 MG/1; MG/1
TABLET ORAL CONTINUOUS
Status: ACTIVE | OUTPATIENT
Start: 2018-11-18 | End: 2018-11-19

## 2018-11-18 RX ORDER — MAGNESIUM SULFATE HEPTAHYDRATE 40 MG/ML
2 INJECTION, SOLUTION INTRAVENOUS
Status: DISPENSED | OUTPATIENT
Start: 2018-11-18 | End: 2018-11-19

## 2018-11-18 RX ORDER — GLUCAGON 1 MG
1 KIT INJECTION
Status: DISCONTINUED | OUTPATIENT
Start: 2018-11-18 | End: 2018-11-21

## 2018-11-18 RX ORDER — IBUPROFEN 200 MG
24 TABLET ORAL
Status: DISCONTINUED | OUTPATIENT
Start: 2018-11-18 | End: 2018-11-21

## 2018-11-18 RX ORDER — MIDAZOLAM HYDROCHLORIDE 1 MG/ML
INJECTION, SOLUTION INTRAMUSCULAR; INTRAVENOUS
Status: DISCONTINUED | OUTPATIENT
Start: 2018-11-18 | End: 2018-11-18

## 2018-11-18 RX ORDER — PROPOFOL 10 MG/ML
VIAL (ML) INTRAVENOUS
Status: DISCONTINUED | OUTPATIENT
Start: 2018-11-18 | End: 2018-11-18

## 2018-11-18 RX ORDER — ROCURONIUM BROMIDE 10 MG/ML
INJECTION, SOLUTION INTRAVENOUS
Status: DISCONTINUED | OUTPATIENT
Start: 2018-11-18 | End: 2018-11-18

## 2018-11-18 RX ORDER — INSULIN ASPART 100 [IU]/ML
0-10 INJECTION, SOLUTION INTRAVENOUS; SUBCUTANEOUS
Status: DISCONTINUED | OUTPATIENT
Start: 2018-11-18 | End: 2018-11-21

## 2018-11-18 RX ORDER — IBUPROFEN 200 MG
16 TABLET ORAL
Status: DISCONTINUED | OUTPATIENT
Start: 2018-11-18 | End: 2018-11-21

## 2018-11-18 RX ADMIN — LINEZOLID 600 MG: 600 INJECTION, SOLUTION INTRAVENOUS at 09:11

## 2018-11-18 RX ADMIN — CIPROFLOXACIN 400 MG: 2 INJECTION, SOLUTION INTRAVENOUS at 08:11

## 2018-11-18 RX ADMIN — FLUCONAZOLE IN SODIUM CHLORIDE 200 MG: 2 INJECTION, SOLUTION INTRAVENOUS at 04:11

## 2018-11-18 RX ADMIN — SODIUM PHOSPHATE, MONOBASIC, MONOHYDRATE 30 MMOL: 276; 142 INJECTION, SOLUTION INTRAVENOUS at 02:11

## 2018-11-18 RX ADMIN — HYDROCORTISONE SODIUM SUCCINATE 50 MG: 100 INJECTION, POWDER, FOR SOLUTION INTRAMUSCULAR; INTRAVENOUS at 05:11

## 2018-11-18 RX ADMIN — DIBASIC SODIUM PHOSPHATE, MONOBASIC POTASSIUM PHOSPHATE AND MONOBASIC SODIUM PHOSPHATE 2 TABLET: 852; 155; 130 TABLET ORAL at 05:11

## 2018-11-18 RX ADMIN — PROPOFOL 50 MCG/KG/MIN: 10 INJECTION, EMULSION INTRAVENOUS at 09:11

## 2018-11-18 RX ADMIN — SODIUM CHLORIDE: 0.9 INJECTION, SOLUTION INTRAVENOUS at 06:11

## 2018-11-18 RX ADMIN — MIDAZOLAM HYDROCHLORIDE 2 MG: 1 INJECTION, SOLUTION INTRAMUSCULAR; INTRAVENOUS at 02:11

## 2018-11-18 RX ADMIN — HYDROCORTISONE SODIUM SUCCINATE 50 MG: 100 INJECTION, POWDER, FOR SOLUTION INTRAMUSCULAR; INTRAVENOUS at 09:11

## 2018-11-18 RX ADMIN — ROCURONIUM BROMIDE 10 MG: 10 INJECTION, SOLUTION INTRAVENOUS at 03:11

## 2018-11-18 RX ADMIN — HYDROCORTISONE SODIUM SUCCINATE 50 MG: 100 INJECTION, POWDER, FOR SOLUTION INTRAMUSCULAR; INTRAVENOUS at 01:11

## 2018-11-18 RX ADMIN — INSULIN ASPART 2 UNITS: 100 INJECTION, SOLUTION INTRAVENOUS; SUBCUTANEOUS at 08:11

## 2018-11-18 RX ADMIN — ISAVUCONAZONIUM SULFATE: 74.4 INJECTION, POWDER, LYOPHILIZED, FOR SOLUTION INTRAVENOUS at 07:11

## 2018-11-18 RX ADMIN — PROPOFOL 50 MG: 10 INJECTION, EMULSION INTRAVENOUS at 03:11

## 2018-11-18 RX ADMIN — SODIUM CHLORIDE 0.8 UNITS/HR: 9 INJECTION, SOLUTION INTRAVENOUS at 08:11

## 2018-11-18 RX ADMIN — PROPOFOL 45 MCG/KG/MIN: 10 INJECTION, EMULSION INTRAVENOUS at 01:11

## 2018-11-18 RX ADMIN — PROPOFOL 50 MCG/KG/MIN: 10 INJECTION, EMULSION INTRAVENOUS at 04:11

## 2018-11-18 RX ADMIN — FENTANYL CITRATE 50 MCG: 50 INJECTION INTRAMUSCULAR; INTRAVENOUS at 02:11

## 2018-11-18 RX ADMIN — LINEZOLID 600 MG: 600 INJECTION, SOLUTION INTRAVENOUS at 08:11

## 2018-11-18 RX ADMIN — INSULIN ASPART 2 UNITS: 100 INJECTION, SOLUTION INTRAVENOUS; SUBCUTANEOUS at 06:11

## 2018-11-18 RX ADMIN — PROPOFOL 35 MCG/KG/MIN: 10 INJECTION, EMULSION INTRAVENOUS at 09:11

## 2018-11-18 RX ADMIN — FENTANYL CITRATE 25 MCG: 50 INJECTION INTRAMUSCULAR; INTRAVENOUS at 10:11

## 2018-11-18 RX ADMIN — INSULIN ASPART 1 UNITS: 100 INJECTION, SOLUTION INTRAVENOUS; SUBCUTANEOUS at 12:11

## 2018-11-18 RX ADMIN — FAMOTIDINE 20 MG: 10 INJECTION, SOLUTION INTRAVENOUS at 09:11

## 2018-11-18 RX ADMIN — CALCIUM GLUCONATE: 94 INJECTION, SOLUTION INTRAVENOUS at 10:11

## 2018-11-18 RX ADMIN — SODIUM CHLORIDE: 0.9 INJECTION, SOLUTION INTRAVENOUS at 09:11

## 2018-11-18 RX ADMIN — ROCURONIUM BROMIDE 40 MG: 10 INJECTION, SOLUTION INTRAVENOUS at 02:11

## 2018-11-18 RX ADMIN — SODIUM PHOSPHATE, MONOBASIC, MONOHYDRATE 30 MMOL: 276; 142 INJECTION, SOLUTION INTRAVENOUS at 11:11

## 2018-11-18 RX ADMIN — PROPOFOL 50 MCG/KG/MIN: 10 INJECTION, EMULSION INTRAVENOUS at 01:11

## 2018-11-18 RX ADMIN — INSULIN ASPART 6 UNITS: 100 INJECTION, SOLUTION INTRAVENOUS; SUBCUTANEOUS at 04:11

## 2018-11-18 RX ADMIN — ISAVUCONAZONIUM SULFATE: 74.4 INJECTION, POWDER, LYOPHILIZED, FOR SOLUTION INTRAVENOUS at 11:11

## 2018-11-18 RX ADMIN — SODIUM CHLORIDE, SODIUM GLUCONATE, SODIUM ACETATE, POTASSIUM CHLORIDE, MAGNESIUM CHLORIDE, SODIUM PHOSPHATE, DIBASIC, AND POTASSIUM PHOSPHATE: .53; .5; .37; .037; .03; .012; .00082 INJECTION, SOLUTION INTRAVENOUS at 02:11

## 2018-11-18 RX ADMIN — PROPOFOL 50 MCG/KG/MIN: 10 INJECTION, EMULSION INTRAVENOUS at 05:11

## 2018-11-18 RX ADMIN — MAGNESIUM SULFATE IN WATER 2 G: 40 INJECTION, SOLUTION INTRAVENOUS at 05:11

## 2018-11-18 RX ADMIN — INSULIN ASPART 4 UNITS: 100 INJECTION, SOLUTION INTRAVENOUS; SUBCUTANEOUS at 12:11

## 2018-11-18 RX ADMIN — DIBASIC SODIUM PHOSPHATE, MONOBASIC POTASSIUM PHOSPHATE AND MONOBASIC SODIUM PHOSPHATE 2 TABLET: 852; 155; 130 TABLET ORAL at 09:11

## 2018-11-18 RX ADMIN — MAGNESIUM SULFATE IN WATER 2 G: 40 INJECTION, SOLUTION INTRAVENOUS at 12:11

## 2018-11-18 NOTE — ASSESSMENT & PLAN NOTE
Will clarify with patient is hx DM once extubated.   BG goal 140-180.     Start IV transition insulin infusion at 0.8 u/hr.   BG monitoring every 4 hours and moderate dose correction scale.     Discharge plans- TBD

## 2018-11-18 NOTE — ASSESSMENT & PLAN NOTE
Avoid insulin stacking and hypoglycemia.  CRRT per nephrology  Lab Results   Component Value Date    CREATININE 0.9 11/18/2018

## 2018-11-18 NOTE — PROGRESS NOTES
Pt stable overnight. Temperature afebrile. Heart rate NSR. SBP >100 mmHg without pressors. Anuric. One unit of platelets administered.  Propofol infusing for sedation. TPN initiated overnight. Chlorhexidine bath completed, OR consent completed for washout today. CRRT restarted. Patient appears asleep free of injury. Please see full assessment details in flowsheet documentation.

## 2018-11-18 NOTE — ASSESSMENT & PLAN NOTE
Managed per LTS.   avoid hypoglycemia  Optimize BG control for surgical wound healing.     Lab Results   Component Value Date    ALT 69 (H) 11/18/2018    AST 25 11/18/2018     (H) 11/18/2018    ALKPHOS 142 (H) 11/18/2018    BILITOT 11.4 (H) 11/18/2018

## 2018-11-18 NOTE — PROGRESS NOTES
Pt came back from OR after getting a washout. Pt was reintubated in OR with a 7.5 ETT secured 25cm at the lip. Pt placed on the vent at the documented settings. Will continue to monitor.

## 2018-11-18 NOTE — TRANSFER OF CARE
"Anesthesia Transfer of Care Note    Patient: Femi Enciso    Procedure(s) Performed: Procedure(s) (LRB):  LAPAROTOMY, EXPLORATORY, AFTER LIVER TRANSPLANT, LIKELY  ABDOMINAL CLOSURE (N/A)    Anesthesia PACU Handoff    Last vitals:   Visit Vitals  /77   Pulse 91   Temp 36.6 °C (97.9 °F) (Oral)   Resp (!) 25   Ht 5' 10" (1.778 m)   Wt 98.1 kg (216 lb 4.3 oz)   SpO2 99%   BMI 31.03 kg/m²     "

## 2018-11-18 NOTE — ASSESSMENT & PLAN NOTE
Avoid insulin stacking and hypoglycemia.  CRRT per nephrology  Lab Results   Component Value Date    CREATININE 1.0 11/18/2018

## 2018-11-18 NOTE — ASSESSMENT & PLAN NOTE
S/P liver transplant     54 y/o man with ESLD, ESRD and DM2 now s/p liver transplant with subsequent takeback on POD 5 (11/16) due to bleeding.      Neuro:   - Sedation: propofol 40  - Pain control: PRN fentanyl     Pulmonary:   - intubated  Vent Mode: A/C  Oxygen Concentration (%):  [] 40  Resp Rate Total:  [0 br/min-41 br/min] 25 br/min  Vt Set:  [320 mL] 320 mL  PEEP/CPAP:  [5 cmH20-6 cmH20] 6 cmH20  Mean Airway Pressure:  [7.5 [id number] cmH20] 11 cmH20  - daily cxr  - duonebs prn  Recent Labs     11/18/18  0604   PH 7.411   PCO2 43.2   PO2 79*   HCO3 27.4   POCSATURATED 96   BE 3        Cardiac:  - Drips: none; previously requiring Levo  - HDS     Renal:   -CRRT overnight  -trend BMP  - BUN/Cr: 13/1.5 -> 35/2.7-> 31/2.1-->7/0.7 -> 13/1.1  - Nephrology on board, appreciate recommendations     Infectious Disease:   - AF  - Trend WBC - 12 -> 13->12->9.6, spiked to 24 now 12  - Abx: completed course of Cipro (UCx 11/11 grew Enterococcus)  - ID on board, appreciate recs - cipro, linezolid, diflucan  - Prophylaxis per transplant - Valcyte, Diflucan  - IS per Transplant - MMF, Prograf     Hematology/Oncology:  - Hgb 7.7 -> 9.4 after 2U PRBCs  - Plt 71 -> 33; now 54 after 2U Plt  - INR 1.2 to 1.4, back to 1.2 after 1U FFP  - Trend CBC   - ongoing transfusion requirements; transfuse per Transplant     Endocrine:  - BG 140s-250s  - SSI   - Endocrinology on board, appreciate recommendations     Fluids/Electrolytes/Nutrition/GI:   - NPO while intubated   - Trend CMP  - Replace Lytes PRN  - liver US 11/12 - 6.6 cm complex fluid collection deep to left lobe; increased flow velocity in main hepatic artery   - abdomen open after takeback x 2 on 11/16 for bleed, hematoma evacuation; will return to OR today for washout, evaluation of bleeding, likely closure     Prophylaxis:  - SQH  - Famotidine     Dispo:  Continue ICU care - continuing on CRRT, open abdomen w/ abthera, pending closure, intubated  Primary team:Transplant

## 2018-11-18 NOTE — ANESTHESIA POSTPROCEDURE EVALUATION
"Anesthesia Post Evaluation    Patient: Femi Enciso    Procedure(s) Performed: Procedure(s) (LRB):  LAPAROTOMY, EXPLORATORY, AFTER LIVER TRANSPLANT, LIKELY  ABDOMINAL CLOSURE (N/A)    Final Anesthesia Type: general  Patient location during evaluation: ICU  Patient participation: No - Unable to Participate, Other Reason (see comments) (intubated and sedated)  Level of consciousness: sedated  Post-procedure vital signs: reviewed and stable  Pain management: adequate  Airway patency: patent  PONV status at discharge: No PONV  Anesthetic complications: no      Cardiovascular status: blood pressure returned to baseline and hemodynamically stable  Respiratory status: ventilator and ETT  Hydration status: euvolemic  Follow-up not needed.        Visit Vitals  /69 (BP Location: Left arm, Patient Position: Lying)   Pulse 91   Temp 36.7 °C (98.1 °F) (Oral)   Resp (!) 25   Ht 5' 10" (1.778 m)   Wt 98.1 kg (216 lb 4.3 oz)   SpO2 100%   BMI 31.03 kg/m²       Pain/Torrey Score: Pain Assessment Performed: Yes (11/18/2018 11:00 AM)  Presence of Pain: complains of pain/discomfort (11/18/2018 11:00 AM)  Pain Rating Prior to Med Admin: 3 (11/18/2018 11:00 AM)  Pain Rating Post Med Admin: 0 (11/18/2018 11:10 AM)        "

## 2018-11-18 NOTE — ASSESSMENT & PLAN NOTE
Managed per LTS.   avoid hypoglycemia  Optimize BG control for surgical wound healing.     Lab Results   Component Value Date    ALT 81 (H) 11/18/2018    AST 30 11/18/2018     (H) 11/18/2018    ALKPHOS 151 (H) 11/18/2018    BILITOT 12.0 (H) 11/18/2018

## 2018-11-18 NOTE — PROGRESS NOTES
"[facility]  Endocrinology  Progress Note    Admit Date: 10/27/2018     Reason for Consult: Management of Hyperglycemia and type 2 DM    Surgical Procedure and Date: liver transplant 11/11/18; exploratory lap and liver biopsy on 11/16/18     Diabetes diagnosis year: ~ 3-4 years ago     Home Diabetes Medications:  none; previously on metformin     How often checking glucose at home? DORIAN   BG readings on regimen: DORIAN  Hypoglycemia on the regimen?  n/a  Missed doses on regimen? n/a    Diabetes Complications include:     DORIAN    Complicating diabetes co morbidities:   CIRRHOSIS and Glucocorticoid use       HPI:   Patient is a 53 y.o. male with a diagnosis of ESLD secondary to ETOH abuse and DEL with ESRD with RRT. Patient is now s/p liver transplant. Endocrinology consulted for BG management.  Patient reports having DM x 1 year, takes Metformin 1000 mg BID.      Lab Results   Component Value Date    HGBA1C 4.4 11/09/2018             Interval HPI:   Overnight events: remains in ICU, remains intubated and sedated.  BG above goal with correction scale coverage. TPN started. Hydrocortisone 50 mg every 8 hours. Tentative OR today for washout, evaluation of bleeding, and closure.   Eating:   NPO  Hypoglycemia and intervention: No  Fever: No  TPN: Yes at 60 cc/hr    /65 (BP Location: Left arm, Patient Position: Lying)   Pulse 78   Temp 98 °F (36.7 °C) (Oral)   Resp (!) 25   Ht 5' 10" (1.778 m)   Wt 98.1 kg (216 lb 4.3 oz)   SpO2 100%   BMI 31.03 kg/m²      Labs Reviewed and Include    Recent Labs   Lab 11/18/18  0815   *   CALCIUM 7.7*   ALBUMIN 2.5*   PROT 4.1*      K 4.1   CO2 24      BUN 11   CREATININE 1.0   ALKPHOS 151*   ALT 81*   AST 30   BILITOT 12.0*     Lab Results   Component Value Date    WBC 12.72 (H) 11/18/2018    HGB 9.6 (L) 11/18/2018    HCT 28.2 (L) 11/18/2018    MCV 88 11/18/2018    PLT 55 (L) 11/18/2018     No results for input(s): TSH, FREET4 in the last 168 " hours.  Lab Results   Component Value Date    HGBA1C 4.4 11/09/2018       Nutritional status:   Body mass index is 31.03 kg/m².  Lab Results   Component Value Date    ALBUMIN 2.5 (L) 11/18/2018    ALBUMIN 2.7 (L) 11/18/2018    ALBUMIN 2.7 (L) 11/18/2018    ALBUMIN 2.7 (L) 11/18/2018     Lab Results   Component Value Date    PREALBUMIN 7 (L) 10/27/2018       Estimated Creatinine Clearance: 100.3 mL/min (based on SCr of 1 mg/dL).    Accu-Checks  Recent Labs     11/16/18  0709 11/16/18  1428 11/16/18  1507 11/17/18  0001 11/17/18  0405 11/17/18  1203 11/17/18  1754 11/17/18  2359 11/18/18  0602 11/18/18  0815   POCTGLUCOSE 101 191* 238* 147* 151* 219* 189* 255* 235* 241*       Current Medications and/or Treatments Impacting Glycemic Control  Immunotherapy:    Immunosuppressants     None        Steroids:   Hormones (From admission, onward)    Start     Stop Route Frequency Ordered    11/17/18 1400  hydrocortisone sodium succinate injection 50 mg      -- IV Every 8 hours 11/17/18 0859    11/09/18 1345  methylPREDNISolone sodium succinate injection 500 mg  (Med - Immunosuppression Induction Therapy (Methylprednisolone))      11/10 0144 IV Once pre-op 11/09/18 1236        Pressors:    Autonomic Drugs (From admission, onward)    Start     Stop Route Frequency Ordered    11/16/18 1615  norepinephrine 4 mg in dextrose 5% 250 mL infusion (premix) (titrating)     Question Answer Comment   Titrate by: (in mcg/kg/min) 5    Titrate interval: (in minutes) 15    Titrate to maintain: (MAP or SBP) SBP    Greater than: (in mmHg) 100    Maximum dose: (in mcg/kg/min) 15        -- IV Continuous 11/16/18 1509        Hyperglycemia/Diabetes Medications:   Antihyperglycemics (From admission, onward)    Start     Stop Route Frequency Ordered    11/18/18 0830  insulin regular (Humulin R) 100 Units in sodium chloride 0.9% 100 mL infusion      -- IV Continuous 11/18/18 0726    11/18/18 0826  insulin aspart U-100 pen 0-10 Units      -- SubQ As  needed (PRN) 11/18/18 0726          ASSESSMENT and PLAN    * S/P liver transplant    Managed per LTS.   avoid hypoglycemia  Optimize BG control for surgical wound healing.     Lab Results   Component Value Date    ALT 81 (H) 11/18/2018    AST 30 11/18/2018     (H) 11/18/2018    ALKPHOS 151 (H) 11/18/2018    BILITOT 12.0 (H) 11/18/2018            Acute hyperglycemia    Will clarify with patient is hx DM once extubated.   BG goal 140-180.     Start IV transition insulin infusion at 0.8 u/hr.   BG monitoring every 4 hours and moderate dose correction scale.     Discharge plans- TBD     Adrenal cortical steroids causing adverse effect in therapeutic use    On standard steroid taper per transplant team; may elevate BG readings         DEL (acute kidney injury)    Avoid insulin stacking and hypoglycemia.  CRRT per nephrology  Lab Results   Component Value Date    CREATININE 1.0 11/18/2018            Acute renal failure    Avoid insulin stacking and hypoglycemia.         Prophylactic immunotherapy    May increase insulin resistance.            Tessa Falk NP  Endocrinology  [facility]

## 2018-11-18 NOTE — SUBJECTIVE & OBJECTIVE
Interval History/Significant Events: Patient received 2U Plt, 1 FFP, 2 RBCs; Plt improved 33 -> 54, INR 1.4 -> 1.2, Hgb 7.7 -> 9.4. CT C/A/P showed what appears to be hematoma posterior to right hepatic lobe, with superimposed postsurgical and hemostatic material. Pt remains sedated on propofol, intubated on AC//40%/6.0. On CRRT, not currently on pressors. WBC [id number] -> 12 on cipro, Zyvox and diflucan.     Follow-up For: Procedure(s) (LRB):  LAPAROTOMY, EXPLORATORY (N/A)    Post-Operative Day: 2 Days Post-Op    Objective:     Vital Signs (Most Recent):  Temp: 98 °F (36.7 °C) (11/18/18 0700)  Pulse: 82 (11/18/18 0800)  Resp: (!) 25 (11/18/18 0800)  BP: 105/65 (11/18/18 0200)  SpO2: 100 % (11/18/18 0800) Vital Signs (24h Range):  Temp:  [96.7 °F (35.9 °C)-98.4 °F (36.9 °C)] 98 °F (36.7 °C)  Pulse:  [63-98] 82  Resp:  [10-33] 25  SpO2:  [97 %-100 %] 100 %  BP: ()/(63-80) 105/65  Arterial Line BP: (3-149)/(0-82) 119/60     Weight: 98.1 kg (216 lb 4.3 oz)  Body mass index is 31.03 kg/m².      Intake/Output Summary (Last 24 hours) at 11/18/2018 0807  Last data filed at 11/18/2018 0800  Gross per 24 hour   Intake 9710.22 ml   Output 7899 ml   Net 1811.22 ml       Physical Exam   Constitutional: He appears well-developed.   jaundiced   HENT:   Head: Normocephalic and atraumatic.   Eyes: Scleral icterus is present.   Cardiovascular: Normal rate and regular rhythm.   Pulmonary/Chest: Effort normal. No respiratory distress.   intubated     Abdominal: Soft. He exhibits no distension.   athera wound vac, output SS;   JOSE A x 1, SS   Musculoskeletal: He exhibits edema.   Neurological:   Sedated on propofol but obeying commands in upper extremities   Skin: Skin is warm and dry.   Jaundiced       Vents:  Vent Mode: A/C (11/18/18 0709)  Ventilator Initiated: Yes (11/16/18 2022)  Set Rate: 16 bmp (11/18/18 0709)  Vt Set: 320 mL (11/18/18 0709)  Pressure Support: 5 cmH20 (11/12/18 1450)  PEEP/CPAP: 6 cmH20 (11/18/18  0709)  Oxygen Concentration (%): 40 (11/18/18 0800)  Peak Airway Pressure: 26 cmH2O (11/18/18 0709)  Plateau Pressure: 16 cmH20 (11/18/18 0709)  Total Ve: 5.73 mL (11/18/18 0709)  Negative Inspiratory Force (cm H2O): -39 (11/12/18 1450)  F/VT Ratio<105 (RSBI): 128.65 (11/18/18 0709)    Lines/Drains/Airways     Central Venous Catheter Line                 Percutaneous Central Line Insertion/Assessment - double lumen  right internal jugular -- days         Tunneled Central Line Insertion/Assessment - Double Lumen  11/07/18 1350 right internal jugular 10 days          Drain                 Urethral Catheter 11/11/18 0246 Straight-tip;Non-latex 16 Fr. 7 days         Closed/Suction Drain 11/16/18 1127 Right;Anterior RLQ Bulb 19 Fr. 1 day         Closed/Suction Drain 11/16/18 1758 Right Abdomen Bulb 19 Fr. 1 day         NG/OG Tube 11/16/18 1500 1 day          Airway                 Airway - Non-Surgical 11/16/18 1643 Endotracheal Tube 1 day          Arterial Line                 Arterial Line 11/11/18 0159 Left Radial 7 days          Pressure Ulcer                 Negative Pressure Wound Therapy  1 day          Surgical Packing                 Surgical Packing 1 day                Significant Labs:    CBC/Anemia Profile:  Recent Labs   Lab 11/17/18 2000 11/17/18 2359 11/18/18  0352   WBC 15.05* 12.77* 12.44  12.44  12.44   HGB 9.3* 8.9* 9.4*  9.4*  9.4*   HCT 27.2* 26.1* 27.4*  27.4*  27.4*   PLT 46* 56* 54*  54*  54*   MCV 89 87 89  89  89   RDW 15.7* 15.6* 15.7*  15.7*  15.7*        Chemistries:  Recent Labs   Lab 11/17/18  1414  11/17/18 2000 11/17/18 2200 11/17/18 2359 11/18/18  0352     138  138  138   < > 139 138 137 137  137  137   K 4.8  4.8  4.8  4.8   < > 4.7 4.5 4.4 4.2  4.2  4.2     107  107  107   < > 107 106 106 106  106  106   CO2 22*  22*  22*  22*   < > 23 23 22* 24  24  24   BUN 14  14  14  14   < > 17 17 15 13  13  13   CREATININE 1.3  1.3  1.3   1.3   < > 1.6* 1.5* 1.4 1.1  1.1  1.1   CALCIUM 8.2*  8.2*  8.2*  8.2*   < > 8.4* 8.3* 8.0* 7.9*  7.9*  7.9*   ALBUMIN 2.8*  2.8*  2.8*  2.8*   < > 3.1* 2.9* 2.8* 2.7*  2.7*  2.7*   PROT  --    < > 4.4*  --  4.1* 4.2*   BILITOT  --    < > 12.4*  --  12.0* 12.1*   ALKPHOS  --    < > 129  --  129 132   ALT  --    < > 96*  --  83* 81*   AST  --    < > 42*  --  36 32   MG 2.0  2.0  2.0  2.0  --   --  1.9  --  2.3  2.3  2.3   PHOS 2.7  2.7  2.7  2.7  --   --  2.6*  --  1.3*  1.3*  1.3*  1.3*    < > = values in this interval not displayed.       Significant Imaging:  I have reviewed and interpreted all pertinent imaging results/findings within the past 24 hours.

## 2018-11-18 NOTE — ASSESSMENT & PLAN NOTE
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 POD#4    Neuro  -acute change in mental status with confusion and agitation on 11/14, controlled with PRN ativan   -monitor neuro exam when sedation weaned, currently sedated with propofol    CV  -remained off pressors for last 24hrs, continue to monitor and leave off as tolerated  -swan tricia catheter removed  -monitor on telemetry     Resp  -left intubated following OR 11/16 given open abdomen with planned take-back, minimal vent settings  -Vent Mode: A/C  Oxygen Concentration (%):  [] 40  Resp Rate Total:  [0 br/min-42 br/min] 25 br/min  Vt Set:  [320 mL] 320 mL  PEEP/CPAP:  [6 cmH20] 6 cmH20  Mean Airway Pressure:  [7.5 [id number] cmH20] 9.4 cmH20  -ABGs prn  -daily CXR while intubated   -continue to monitor O2 sats   -CXR stable from repeat yesterday     Heme  -s/p transfusion 2 units pRBCs and 2 plts yesterday, H/H stable this morning 9.6/28  -INR 1.3  -q4 CBCs, transfuse products as necessary    ID  -monitor fever and WBC   -f/u cultures   -Abx: Cipro, fluconazole and linezolid     MSK  -critical nature of patient prohibits OOBTC or ambulation     FEN/GI  -replace lytes   -Continue NPO for now  -s/p ex-lap 11/16 requiring take-back for bleeding, abdomen packed and left open with abthera and plans for take-back today  - CT abdomen/pelvis (non-contrast) yesterday appears to demonstrate stability      Endocrine  -insulin as needed. Monitor BG       -CRRT per renal, would like to increase UF of CRRT today     dispo  -continue ICU care, will plan for take-back to OR later today

## 2018-11-18 NOTE — PROGRESS NOTES
[facility]  Critical Care - Surgery  Progress Note    Patient Name: Femi Enciso  MRN: [id number]  Admission Date: 10/27/2018  Hospital Length of Stay: 22 days  Code Status: Full Code  Attending Provider: Femi Patel MD  Primary Care Provider: Primary Doctor No   Principal Problem: S/P liver transplant    Subjective:     Hospital/ICU Course:  11/11: s/p liver transplant  11/12 - extubated, weaned off pressors; pulled out all 3 JOSE A drains  11/13 - CRRT held overnight; 1U Plt  11/14 -2u pRBC  11/15- 1u prbc  11/16- OR x 2 for bleeding, abthera + JOSE A x 1, 7 PRBC, 3 plt, 1 FFP  11/17 - 2 Plt, 1 FFP, 2 RBCs    Interval History/Significant Events: Patient received 2U Plt, 1 FFP, 2 RBCs; Plt improved 33 -> 54, INR 1.4 -> 1.2, Hgb 7.7 -> 9.4. CT C/A/P showed what appears to be hematoma posterior to right hepatic lobe, with superimposed postsurgical and hemostatic material. Pt remains sedated on propofol, intubated on AC//40%/6.0. On CRRT, not currently on pressors. WBC [id number] -> 12 on cipro, Zyvox and diflucan.     Follow-up For: Procedure(s) (LRB):  LAPAROTOMY, EXPLORATORY (N/A)    Post-Operative Day: 2 Days Post-Op    Objective:     Vital Signs (Most Recent):  Temp: 98 °F (36.7 °C) (11/18/18 0700)  Pulse: 82 (11/18/18 0800)  Resp: (!) 25 (11/18/18 0800)  BP: 105/65 (11/18/18 0200)  SpO2: 100 % (11/18/18 0800) Vital Signs (24h Range):  Temp:  [96.7 °F (35.9 °C)-98.4 °F (36.9 °C)] 98 °F (36.7 °C)  Pulse:  [63-98] 82  Resp:  [10-33] 25  SpO2:  [97 %-100 %] 100 %  BP: ()/(63-80) 105/65  Arterial Line BP: (3-149)/(0-82) 119/60     Weight: 98.1 kg (216 lb 4.3 oz)  Body mass index is 31.03 kg/m².      Intake/Output Summary (Last 24 hours) at 11/18/2018 0807  Last data filed at 11/18/2018 0800  Gross per 24 hour   Intake 9710.22 ml   Output 7899 ml   Net 1811.22 ml       Physical Exam   Constitutional: He appears well-developed.   jaundiced   HENT:   Head: Normocephalic and atraumatic.    Eyes: Scleral icterus is present.   Cardiovascular: Normal rate and regular rhythm.   Pulmonary/Chest: Effort normal. No respiratory distress.   intubated     Abdominal: Soft. He exhibits no distension.   athera wound vac, output SS;   JOSE A x 1, SS   Musculoskeletal: He exhibits edema.   Neurological:   Sedated on propofol but obeying commands in upper extremities   Skin: Skin is warm and dry.   Jaundiced       Vents:  Vent Mode: A/C (11/18/18 0709)  Ventilator Initiated: Yes (11/16/18 2022)  Set Rate: 16 bmp (11/18/18 0709)  Vt Set: 320 mL (11/18/18 0709)  Pressure Support: 5 cmH20 (11/12/18 1450)  PEEP/CPAP: 6 cmH20 (11/18/18 0709)  Oxygen Concentration (%): 40 (11/18/18 0800)  Peak Airway Pressure: 26 cmH2O (11/18/18 0709)  Plateau Pressure: 16 cmH20 (11/18/18 0709)  Total Ve: 5.73 mL (11/18/18 0709)  Negative Inspiratory Force (cm H2O): -39 (11/12/18 1450)  F/VT Ratio<105 (RSBI): 128.65 (11/18/18 0709)    Lines/Drains/Airways     Central Venous Catheter Line                 Percutaneous Central Line Insertion/Assessment - double lumen  right internal jugular -- days         Tunneled Central Line Insertion/Assessment - Double Lumen  11/07/18 1350 right internal jugular 10 days          Drain                 Urethral Catheter 11/11/18 0246 Straight-tip;Non-latex 16 Fr. 7 days         Closed/Suction Drain 11/16/18 1127 Right;Anterior RLQ Bulb 19 Fr. 1 day         Closed/Suction Drain 11/16/18 1758 Right Abdomen Bulb 19 Fr. 1 day         NG/OG Tube 11/16/18 1500 1 day          Airway                 Airway - Non-Surgical 11/16/18 1643 Endotracheal Tube 1 day          Arterial Line                 Arterial Line 11/11/18 0159 Left Radial 7 days          Pressure Ulcer                 Negative Pressure Wound Therapy  1 day          Surgical Packing                 Surgical Packing 1 day                Significant Labs:    CBC/Anemia Profile:  Recent Labs   Lab 11/17/18  2000 11/17/18  2359 11/18/18  0352   WBC 15.05*  12.77* 12.44  12.44  12.44   HGB 9.3* 8.9* 9.4*  9.4*  9.4*   HCT 27.2* 26.1* 27.4*  27.4*  27.4*   PLT 46* 56* 54*  54*  54*   MCV 89 87 89  89  89   RDW 15.7* 15.6* 15.7*  15.7*  15.7*        Chemistries:  Recent Labs   Lab 11/17/18  1414  11/17/18 2000 11/17/18 2200 11/17/18 2359 11/18/18  0352     138  138  138   < > 139 138 137 137  137  137   K 4.8  4.8  4.8  4.8   < > 4.7 4.5 4.4 4.2  4.2  4.2     107  107  107   < > 107 106 106 106  106  106   CO2 22*  22*  22*  22*   < > 23 23 22* 24  24  24   BUN 14  14  14  14   < > 17 17 15 13  13  13   CREATININE 1.3  1.3  1.3  1.3   < > 1.6* 1.5* 1.4 1.1  1.1  1.1   CALCIUM 8.2*  8.2*  8.2*  8.2*   < > 8.4* 8.3* 8.0* 7.9*  7.9*  7.9*   ALBUMIN 2.8*  2.8*  2.8*  2.8*   < > 3.1* 2.9* 2.8* 2.7*  2.7*  2.7*   PROT  --    < > 4.4*  --  4.1* 4.2*   BILITOT  --    < > 12.4*  --  12.0* 12.1*   ALKPHOS  --    < > 129  --  129 132   ALT  --    < > 96*  --  83* 81*   AST  --    < > 42*  --  36 32   MG 2.0  2.0  2.0  2.0  --   --  1.9  --  2.3  2.3  2.3   PHOS 2.7  2.7  2.7  2.7  --   --  2.6*  --  1.3*  1.3*  1.3*  1.3*    < > = values in this interval not displayed.       Significant Imaging:  I have reviewed and interpreted all pertinent imaging results/findings within the past 24 hours.    Assessment/Plan:     * S/P liver transplant    S/P liver transplant     54 y/o man with ESLD, ESRD and DM2 now s/p liver transplant with subsequent takeback on POD 5 (11/16) due to bleeding.      Neuro:   - Sedation: propofol 40  - Pain control: PRN fentanyl     Pulmonary:   - intubated  Vent Mode: A/C  Oxygen Concentration (%):  [] 40  Resp Rate Total:  [0 br/min-41 br/min] 25 br/min  Vt Set:  [320 mL] 320 mL  PEEP/CPAP:  [5 cmH20-6 cmH20] 6 cmH20  Mean Airway Pressure:  [7.5 fgX75-63 cmH20] 11 cmH20  - daily cxr  - duonebs prn  Recent Labs     11/18/18  0604   PH 7.411   PCO2 43.2   PO2 79*   HCO3 27.4    POCSATURATED 96   BE 3        Cardiac:  - Drips: none; previously requiring Levo  - HDS     Renal:   -CRRT overnight  -trend BMP  - BUN/Cr: 13/1.5 -> 35/2.7-> 31/2.1-->7/0.7 -> 13/1.1  - Nephrology on board, appreciate recommendations     Infectious Disease:   - AF  - Trend WBC - 12 -> 13->12->9.6, spiked to 24 now 12  - Abx: completed course of Cipro (UCx 11/11 grew Enterococcus)  - ID on board, appreciate recs - cipro, linezolid, diflucan  - Prophylaxis per transplant - Valcyte, Diflucan  - IS per Transplant - MMF, Prograf     Hematology/Oncology:  - Hgb 7.7 -> 9.4 after 2U PRBCs  - Plt 71 -> 33; now 54 after 2U Plt  - INR 1.2 to 1.4, back to 1.2 after 1U FFP  - Trend CBC   - ongoing transfusion requirements; transfuse per Transplant     Endocrine:  - BG 140s-250s  - SSI   - Endocrinology on board, appreciate recommendations     Fluids/Electrolytes/Nutrition/GI:   - NPO while intubated   - Trend CMP  - Replace Lytes PRN  - liver US 11/12 - 6.6 cm complex fluid collection deep to left lobe; increased flow velocity in main hepatic artery   - abdomen open after takeback x 2 on 11/16 for bleed, hematoma evacuation; will return to OR today for washout, evaluation of bleeding, likely closure     Prophylaxis:  - SQH  - Famotidine     Dispo:  Continue ICU care - continuing on CRRT, open abdomen w/ abthera, pending closure, intubated  Primary team:Transplant              Critical care was time spent personally by me on the following activities: development of treatment plan with patient or surrogate and bedside caregivers, discussions with consultants, evaluation of patient's response to treatment, examination of patient, ordering and performing treatments and interventions, ordering and review of laboratory studies, ordering and review of radiographic studies, pulse oximetry, re-evaluation of patient's condition.  This critical care time did not overlap with that of any other provider or involve time for any  procedures.     Miguel Ahmadi MD  Critical Care - Surgery  [facility]

## 2018-11-18 NOTE — OP NOTE
Operative Report    Date of Procedure: 11/18/2018    Surgeons:  Surgeon(s) and Role:     * Amadou Lester Jr., MD - Primary     * Petar Beavers MD - Assisting     * Taras Wylie MD - Fellow    First Assistant Attestation:  The presence of an additional attending surgeon functioning as first assistant was required due to the complexity of the procedure relative to any available residents. I certify that no resident was available who was qualified to serve as first assistant. Duties performed by the assistant included assisting the primary surgeon.    Pre-operative Diagnosis: planned reexploration  Post-operative Diagnosis: same    Procedure(s) Perfomed: Exploration of abdomen and removal of 6 laparotomy pads, abdominal washout and fascial closure  Anesthesia: General endotracheal  Estimated Blood Loss: 10 mL  Blood Products Administered: platelets    Findings: one small oozing area over right retroperitoneum, requiring one suture ligature, with good hemostasis afterward.    Specimens: abdominal fluid cultures  Drains: 19f Sarkis drains x 1    Preamble  Indications and Patient Counseling: The procedure was thoroughly explained to the patient and/or family members as appropriate, including potential risks, complications, and alternatives.  The risks associated with not undergoing the proposed procedure were also discussed.  All questions were answered, and the patient and/or family members voiced understanding and agreed to proceed with the operation.    Time-Out: A complete time out was carried out prior to incision, with confirmation of patient identity, correct procedure, correct operative site, appropriate antibiotic prophylaxis, review of any known allergies, and presence of all needed equipment.    Procedure in Detail  Following the induction of general endotracheal anesthesia, appropriate arterial and venous lines were placed by the anesthesia team.  Care was taken to pad all pressure points and avoid any  potential traction injuries from positioning. The urinary bladder was catheterized.  Sequential compression boots and Sang Huggers were used. The abdomen was sterilely prepped and draped.    The abdomen was entered by re-opening the liver transplant incision.  Cultures were obtained of any subcutaneous and peritoneal fluid or clot as appropriate.  Significant peritoneal fluid was present, and it was rina in nature.  Significant hematoma was not present. There was minimal active bleeding. The bile duct was assessed, and there was not visible evidence of a bile leak.  The hepatic artery was palpated, and the thrill was excellent. The liver itself was soft to palpation, and had a well-perfused appearance.  Additional intraabdominal findings included one small oozing area over right retroperitoneum, requiring one suture ligature, with good hemostasis afterward.  6 laparotomy pads placed at prior operation were removed.    After the above exploration, all identifiable bleeding sites were addressed using electrocautery, argon beam coagulation, suture ligatures, and topical hemostatic agents as appropriate.  A needle biopsy of the liver was not obtained.        A final check for hemostasis was made.  1 19f Sarkis drains were placed through separate incisions below the transverse abdominal incision and placed in the usual positions. The cavity was irrigated with saline. The abdomen was closed in layers using running #1 PDS suture. The incision was irrigated and the skin was closed with staples. At the end of the case all instrument, needle, and sponge counts were correct. The patient was taken from the OR in stable condition.

## 2018-11-18 NOTE — ASSESSMENT & PLAN NOTE
BG goal 140-180.     Start IV transition insulin infusion at 0.8 u/hr.   BG monitoring every 4 hours and moderate dose correction scale.     Discharge plans- TBD

## 2018-11-18 NOTE — ASSESSMENT & PLAN NOTE
BG goal 140-180      Increase IV transition insulin infusion at 1.4 u/hr.   BG monitoring every 4 hours and moderate dose correction scale.     Discharge plans- TBD

## 2018-11-18 NOTE — SUBJECTIVE & OBJECTIVE
No acute events overnight, remained off pressors and product requirements stable s/p 2 units pRBCs and 2 units plts yesterday.     Scheduled Meds:   ciprofloxacin (CIPRO)400mg/200ml D5W IVPB  400 mg Intravenous Q12H    famotidine (PF)  20 mg Intravenous Daily    heparin (porcine)  5,000 Units Subcutaneous Q8H    hydrocortisone sodium succinate  50 mg Intravenous Q8H    custom IVPB builder   Intravenous Q8H    Followed by    [START ON 11/20/2018] custom IVPB builder   Intravenous Q24H    k phos di & mono-sod phos mono  2 tablet Oral TID    linezolid 600mg/300ml  600 mg Intravenous Q12H    sodium phosphate IVPB  30 mmol Intravenous Once    [START ON 11/21/2018] valganciclovir 50 mg/ml  450 mg Per NG tube Daily     Continuous Infusions:   sodium chloride 0.9% 200 mL/hr at 11/18/18 1115    insulin (HUMAN R) infusion (adults) 0.8 Units/hr (11/18/18 1200)    norepinephrine bitartrate-D5W Stopped (11/17/18 1100)    propofol 30 mcg/kg/min (11/18/18 1230)    TPN ADULT CENTRAL LINE CUSTOM 60 mL/hr at 11/18/18 1200    TPN ADULT CENTRAL LINE CUSTOM       PRN Meds:sodium chloride, sodium chloride, sodium chloride, sodium chloride, sodium chloride, sodium chloride, dextrose 50%, dextrose 50%, fentaNYL, glucagon (human recombinant), glucose, glucose, insulin aspart U-100, magnesium sulfate IVPB, potassium phosphate IVPB, ramelteon    Review of Systems   Unable to perform ROS: Intubated     Objective:     Vital Signs (Most Recent):  Temp: 97.9 °F (36.6 °C) (11/18/18 1100)  Pulse: 79 (11/18/18 1215)  Resp: (!) 25 (11/18/18 0800)  BP: (!) 100/59 (11/18/18 1200)  SpO2: 100 % (11/18/18 1215) Vital Signs (24h Range):  Temp:  [96.8 °F (36 °C)-98.4 °F (36.9 °C)] 97.9 °F (36.6 °C)  Pulse:  [63-98] 79  Resp:  [10-33] 25  SpO2:  [97 %-100 %] 100 %  BP: ()/(59-80) 100/59  Arterial Line BP: (3-149)/(0-82) 97/50     Weight: 98.1 kg (216 lb 4.3 oz)  Body mass index is 31.03 kg/m².    Intake/Output - Last 3 Shifts        11/16 0700 - 11/17 0659 11/17 0700 - 11/18 0659 11/18 0700 - 11/19 0659    I.V. (mL/kg) 7797.6 (79.5) 8108.2 (82.7) 773 (7.9)    Blood 2456 1081     NG/GT  60     TPN  461     Total Intake(mL/kg) 13187.6 (104.5) 9710.2 (99) 773 (7.9)    Urine (mL/kg/hr)  10 (0) 0 (0)    Drains 120 305 160    Other 4223 7434 2851    Stool       Blood 100      Total Output 4443 7749 3011    Net +5810.6 +1961.2 -2238                 Physical Exam   Constitutional: He appears well-developed.   jaundiced   HENT:   Head: Normocephalic and atraumatic.   Eyes: Scleral icterus is present.   Cardiovascular: Normal rate, regular rhythm and intact distal pulses.   Pulmonary/Chest: Effort normal. No respiratory distress.   On vent   Vent Mode: A/C  Oxygen Concentration (%):  (40-99) 40  Resp Rate Total:  (16 br/min-37 br/min) 17 br/min  Vt Set:  (320 mL) 320 mL  PEEP/CPAP:  (5 cmH20) 5 cmH20  Mean Airway Pressure:  (7.8 cmH20-10 cmH20) 8.6 cmH20     Abdominal: Soft. He exhibits no distension.   athera blue sponge wound vac with 1 JOSE A drain in place with no output, wound vac putting out brighter SS output in the 100s/H/    Musculoskeletal: He exhibits edema.   Neurological:   Sedated on propofol   Skin: Skin is warm and dry.   Jaundice improving       Laboratory:  Immunosuppressants     None        Labs within the past 24 hours have been reviewed.    Diagnostic Results:  I have personally reviewed all pertinent imaging studies.

## 2018-11-18 NOTE — ANESTHESIA PREPROCEDURE EVALUATION
[facility]  Anesthesia Pre-Operative Evaluation         Patient Name: Femi Enciso  YOB: 1965  MRN: [id number]    SUBJECTIVE:     Pre-operative evaluation for Procedure(s) (LRB):  LAPAROTOMY, EXPLORATORY, AFTER LIVER TRANSPLANT, LIKELY  ABDOMINAL CLOSURE (N/A)     11/18/2018    Femi Enciso is a 53 y.o. 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 POD#5 who required re-exploration 11/16 for bleeding with abdominal incision left open who now presents for the above procedure.      LDA:   CVC R IJ  A line L radial     Prev airway: Grade II view with Johnson II    Drips:    sodium chloride 0.9%      norepinephrine bitartrate-D5W Stopped (11/17/18 1100)    propofol 50 mcg/kg/min (11/18/18 0600)    TPN ADULT CENTRAL LINE CUSTOM 60 mL/hr at 11/18/18 0600         Patient Active Problem List   Diagnosis    Acute liver failure    Jaundice    Acute renal failure    DEL (acute kidney injury)    ATN (acute tubular necrosis)    Severe alcohol dependence    Coagulopathy    Anemia of chronic disease    Thrombocytopenia    Hyponatremia    Metabolic acidosis    Moderate protein malnutrition    Type 2 diabetes mellitus without complication    Acute cystitis without hematuria    Pleural effusion associated with hepatic disorder    Decompensated hepatic cirrhosis    Alcohol use disorder, severe, in early remission    Acute maculopapular rash    Weakness    S/P liver transplant    Prophylactic immunotherapy    Adrenal cortical steroids causing adverse effect in therapeutic use    Acute hyperglycemia    Encounter for weaning from ventilator    Overweight (BMI 25.0-29.9)    Delirium    Anasarca    Leucocytosis       Review of patient's allergies indicates:   Allergen Reactions    Cefepime Rash    Penicillins Nausea And Vomiting and Rash     Full body rash from cefepime as well       Current Inpatient Medications:   ciprofloxacin  (CIPRO)400mg/200ml D5W IVPB  400 mg Intravenous Q12H    famotidine (PF)  20 mg Intravenous Daily    fluconazole (DIFLUCAN) IVPB  200 mg Intravenous Q24H    heparin (porcine)  5,000 Units Subcutaneous Q8H    hydrocortisone sodium succinate  50 mg Intravenous Q8H    linezolid 600mg/300ml  600 mg Intravenous Q12H    [START ON 11/21/2018] valganciclovir 50 mg/ml  450 mg Per NG tube Daily       No current facility-administered medications on file prior to encounter.      Current Outpatient Medications on File Prior to Encounter   Medication Sig Dispense Refill    calcium carbonate/vitamin D3 (VITAMIN D-3 ORAL) Take 1 tablet by mouth once daily.      cyanocobalamin (VITAMIN B-12) 100 MCG tablet Take 100 mcg by mouth once daily.      folic acid (FOLVITE) 1 MG tablet Take 1 mg by mouth once daily.      lactulose (CHRONULAC) 10 gram/15 mL solution Take 20 g by mouth 3 (three) times daily.      multivitamin (THERAGRAN) per tablet Take 1 tablet by mouth once daily.      omeprazole (PRILOSEC) 40 MG capsule Take 40 mg by mouth once daily.      ondansetron (ZOFRAN) 4 MG tablet Take 4 mg by mouth daily as needed for Nausea.      rifAXIMin (XIFAXAN) 550 mg Tab Take 550 mg by mouth 2 (two) times daily.         Past Surgical History:   Procedure Laterality Date    EGD (ESOPHAGOGASTRODUODENOSCOPY) N/A 11/6/2018    Performed by Duarte Jules MD at [facility] ([address] [place] [address])    ESOPHAGOGASTRODUODENOSCOPY N/A 11/6/2018    Procedure: EGD (ESOPHAGOGASTRODUODENOSCOPY);  Surgeon: Duarte Jules MD;  [facility]  Service: Endoscopy;  Laterality: N/A;    INSERTION OF TUNNELED CENTRAL VENOUS HEMODIALYSIS CATHETER N/A 11/7/2018    Procedure: INSERTION, CATHETER, CENTRAL VENOUS, HEMODIALYSIS, TUNNELED;  Surgeon: Brock Gonzalez MD;  Location: [facility] CATH LAB;  Service: Nephrology;  Laterality: N/A;    INSERTION, CATHETER, CENTRAL VENOUS, HEMODIALYSIS, TUNNELED N/A 11/7/2018    Performed by Brock Gonzalez MD  at [facility] CATH LAB    LIVER TRANSPLANT N/A 11/11/2018    Procedure: TRANSPLANT, LIVER;  Surgeon: Shmuel Leary MD;  Location: [facility] OR [address] [place] [address];  Service: Transplant;  Laterality: N/A;    TRANSPLANT, LIVER N/A 11/11/2018    Performed by Shmuel Leary MD at [facility] OR [address] [place] [address]       Social History     Socioeconomic History    Marital status:      Spouse name: Not on file    Number of children: Not on file    Years of education: Not on file    Highest education level: Not on file   Social Needs    Financial resource strain: Not on file    Food insecurity - worry: Not on file    Food insecurity - inability: Not on file    Transportation needs - medical: Not on file    Transportation needs - non-medical: Not on file   Occupational History    Not on file   Tobacco Use    Smoking status: Never Smoker   Substance and Sexual Activity    Alcohol use: Not on file    Drug use: Not on file    Sexual activity: Not on file   Other Topics Concern    Not on file   Social History Narrative    Not on file       OBJECTIVE:     Vital Signs Range (Last 24H):  Temp:  [35.9 °C (96.7 °F)-36.9 °C (98.4 °F)]   Pulse:  []   Resp:  [10-27]   BP: ()/(60-80)   SpO2:  [93 %-100 %]   Arterial Line BP: (3-149)/(0-82)       CBC:   Recent Labs     11/17/18 2359 11/18/18  0352   WBC 12.77* 12.44  12.44  12.44   RBC 3.00* 3.07*  3.07*  3.07*   HGB 8.9* 9.4*  9.4*  9.4*   HCT 26.1* 27.4*  27.4*  27.4*   PLT 56* 54*  54*  54*   MCV 87 89  89  89   MCH 29.7 30.6  30.6  30.6   MCHC 34.1 34.3  34.3  34.3       CMP:   Recent Labs     11/17/18 2200 11/17/18 2359 11/18/18  0352    137 137  137  137   K 4.5 4.4 4.2  4.2  4.2    106 106  106  106   CO2 23 22* 24  24  24   BUN 17 15 13  13  13   CREATININE 1.5* 1.4 1.1  1.1  1.1   * 262* 248*  248*  248*   MG 1.9  --  2.3  2.3  2.3   PHOS 2.6*  --  1.3*  1.3*  1.3*  1.3*   CALCIUM 8.3* 8.0* 7.9*  7.9*  7.9*    ALBUMIN 2.9* 2.8* 2.7*  2.7*  2.7*   PROT  --  4.1* 4.2*   ALKPHOS  --  129 132   ALT  --  83* 81*   AST  --  36 32   BILITOT  --  12.0* 12.1*       INR:  Recent Labs     11/16/18  0320  11/17/18  0406  11/17/18  1602 11/17/18 2000 11/17/18  2359 11/18/18  0352   INR 1.1   < > 1.4*   < > 1.3* 1.3* 1.2  --    APTT 26.0  --  30.1  --   --   --   --  31.3    < > = values in this interval not displayed.       Diagnostic Studies: No relevant studies.    EKG: No recent studies available.    2D ECHO:  Results for orders placed or performed during the hospital encounter of 10/27/18   2D echo with color flow doppler   Result Value Ref Range    QEF 73 55 - 65    Diastolic Dysfunction No     Est. PA Systolic Pressure 32.16     Tricuspid Valve Regurgitation TRIVIAL          ASSESSMENT/PLAN:         Anesthesia Evaluation    I have reviewed the Patient Summary Reports.    I have reviewed the Nursing Notes.   I have reviewed the Medications.     Review of Systems  Anesthesia Hx:  No previous Anesthesia  History of prior surgery of interest to airway management or planning: liver transplant.  Denies Personal Hx of Anesthesia complications.   Social:  Non-Smoker, Alcohol Use    Hematology/Oncology:         -- Anemia:   Cardiovascular:   Hypertension hyperlipidemia ECG has been reviewed.    Pulmonary:   Shortness of breath (2/2 plueral effusion (drained)) Has pleural effusion   Renal/:   Chronic Renal Disease, Dialysis    Hepatic/GI:   Liver Disease, Hepatitis    Endocrine:   Diabetes    Psych:   Psychiatric History          Physical Exam  General:  Well nourished    Airway/Jaw/Neck:  Airway Findings: Mouth Opening: Normal Tongue: Normal  Mallampati: I  Improves to I with phonation.  TM Distance: Normal, at least 6 cm      Dental:  Dental Findings: In tact   Chest/Lungs:  Chest/Lungs Findings: Clear to auscultation, Normal Respiratory Rate     Heart/Vascular:  Heart Findings: Rate: Normal  Rhythm: Regular Rhythm  Sounds:  Normal     Abdomen:  Abdomen Findings: Normal    Musculoskeletal:  Musculoskeletal Findings: Normal   Skin:  Skin Findings: Normal    Mental Status:  Mental Status Findings:  Cooperative, Alert and Oriented         Anesthesia Plan  Type of Anesthesia, risks & benefits discussed:  Anesthesia Type:  general  Patient's Preference:   Intra-op Monitoring Plan: standard ASA monitors, [place]-Rocio, central line and arterial line  Intra-op Monitoring Plan Comments:   Post Op Pain Control Plan: multimodal analgesia, IV/PO Opioids PRN and per primary service following discharge from PACU  Post Op Pain Control Plan Comments:   Induction:   IV  Beta Blocker:  Patient is not currently on a Beta-Blocker (No further documentation required).       Informed Consent: Patient representative understands risks and agrees with Anesthesia plan.  Questions answered. Anesthesia consent signed with patient representative.  ASA Score: 4     Day of Surgery Review of History & Physical:    H&P update referred to the surgeon.         Ready For Surgery From Anesthesia Perspective.

## 2018-11-18 NOTE — PLAN OF CARE
"Problem: Patient Care Overview  Goal: Plan of Care Review  Dx: Liver transplant (11/11)  hx: ESLD, ETOH abuse; Severe depression.    11/12: liver transplant; extubated. Products given in OR and HD in OR. CRRT nocturnal.  11/13: CRRT nocturnal held; lines removed. FFP x1 given. Pulled out 2 JOSE A drains.   11/14: 3 PRBC's given for hgb 6; liver US shows potential hematoma. Trend CBC. Extreme agitation/attempted to "kill self" by holding breath for as long as possible multiple times. IV ativan/haldol given. Nocturnal CRRT restarted  Potential washout  To be determined.    11/16: OR for exlap and washout--1U PRBCs and 1 plts; sent back to OR--3 U PRBCs, 1FFP, 1cryo; abd open and wound vac placed  11/17: CT chest, abd, pelvis, levo off    Nursing:   -160  Labs Q4H  Plts >70    **Will go back to OR Sunday 11/18   Outcome: Ongoing (interventions implemented as appropriate)  Reviewed plan of care with patient, spouse and mother. Pt intubated on vent settings: AC 40%, 6 of PEEP. Propofol infusing @50mmcg/kg/min. Pt arouses to voice, follows commands. Anuric, patient on CRRT. UF @ goal of 250, tolerating well. Skin intact, free from breakdown. See flow sheet for further assessment. VSS at this time, will continue to monitor.       "

## 2018-11-18 NOTE — CONSULTS
ID is already consulted on this case - please see my note from 11-17-18     Zyvox is approved for use by [facility]

## 2018-11-18 NOTE — PROGRESS NOTES
[facility]  Liver Transplant  Progress Note    Patient Name: Femi Enciso  MRN: [id number]  Admission Date: 10/27/2018  Hospital Length of Stay: 22 days  Code Status: Full Code  Primary Care Provider: Primary Doctor No  Post-Op Day 2 Days Post-Op    ORGAN:   LIVER  Disease Etiology: Alcoholic Cirrhosis  Donor Type:    - Brain Death  CDC High Risk:   No  Donor CMV Status:   Donor CMV Status: Positive  Donor HBcAB:   Negative  Donor HCV Status:   Negative  Donor HBV JAVIER: Negative  Donor HCV JAVIER: Negative  Whole or Partial: Whole Liver  Biliary Anastomosis: End to End  Arterial Anatomy: Standard    Subjective:     No acute events overnight, remained off pressors and product requirements stable s/p 2 units pRBCs and 2 units plts yesterday.     Scheduled Meds:   ciprofloxacin (CIPRO)400mg/200ml D5W IVPB  400 mg Intravenous Q12H    famotidine (PF)  20 mg Intravenous Daily    heparin (porcine)  5,000 Units Subcutaneous Q8H    hydrocortisone sodium succinate  50 mg Intravenous Q8H    custom IVPB builder   Intravenous Q8H    Followed by    [START ON 2018] custom IVPB builder   Intravenous Q24H    k phos di & mono-sod phos mono  2 tablet Oral TID    linezolid 600mg/300ml  600 mg Intravenous Q12H    sodium phosphate IVPB  30 mmol Intravenous Once    [START ON 2018] valganciclovir 50 mg/ml  450 mg Per NG tube Daily     Continuous Infusions:   sodium chloride 0.9% 200 mL/hr at 18 1115    insulin (HUMAN R) infusion (adults) 0.8 Units/hr (18 1200)    norepinephrine bitartrate-D5W Stopped (18 1100)    propofol 30 mcg/kg/min (18 1230)    TPN ADULT CENTRAL LINE CUSTOM 60 mL/hr at 18 1200    TPN ADULT CENTRAL LINE CUSTOM       PRN Meds:sodium chloride, sodium chloride, sodium chloride, sodium chloride, sodium chloride, sodium chloride, dextrose 50%, dextrose 50%, fentaNYL, glucagon (human recombinant), glucose, glucose, insulin aspart U-100, magnesium  sulfate IVPB, potassium phosphate IVPB, ramelteon    Review of Systems   Unable to perform ROS: Intubated     Objective:     Vital Signs (Most Recent):  Temp: 97.9 °F (36.6 °C) (11/18/18 1100)  Pulse: 79 (11/18/18 1215)  Resp: (!) 25 (11/18/18 0800)  BP: (!) 100/59 (11/18/18 1200)  SpO2: 100 % (11/18/18 1215) Vital Signs (24h Range):  Temp:  [96.8 °F (36 °C)-98.4 °F (36.9 °C)] 97.9 °F (36.6 °C)  Pulse:  [63-98] 79  Resp:  [10-33] 25  SpO2:  [97 %-100 %] 100 %  BP: ()/(59-80) 100/59  Arterial Line BP: (3-149)/(0-82) 97/50     Weight: 98.1 kg (216 lb 4.3 oz)  Body mass index is 31.03 kg/m².    Intake/Output - Last 3 Shifts       11/16 0700 - 11/17 0659 11/17 0700 - 11/18 0659 11/18 0700 - 11/19 0659    I.V. (mL/kg) 7797.6 (79.5) 8108.2 (82.7) 773 (7.9)    Blood 2456 1081     NG/GT  60     TPN  461     Total Intake(mL/kg) 52390.6 (104.5) 9710.2 (99) 773 (7.9)    Urine (mL/kg/hr)  10 (0) 0 (0)    Drains 120 305 160    Other 4223 7434 2851    Stool       Blood 100      Total Output 4443 7749 3011    Net +5810.6 +1961.2 -2238                 Physical Exam   Constitutional: He appears well-developed.   jaundiced   HENT:   Head: Normocephalic and atraumatic.   Eyes: Scleral icterus is present.   Cardiovascular: Normal rate, regular rhythm and intact distal pulses.   Pulmonary/Chest: Effort normal. No respiratory distress.   On vent   Vent Mode: A/C  Oxygen Concentration (%):  (40-99) 40  Resp Rate Total:  (16 br/min-37 br/min) 17 br/min  Vt Set:  (320 mL) 320 mL  PEEP/CPAP:  (5 cmH20) 5 cmH20  Mean Airway Pressure:  (7.8 cmH20-10 cmH20) 8.6 cmH20     Abdominal: Soft. He exhibits no distension.   athera blue sponge wound vac with 1 JOSE A drain in place with no output, wound vac putting out brighter SS output in the 100s/H/    Musculoskeletal: He exhibits edema.   Neurological:   Sedated on propofol   Skin: Skin is warm and dry.   Jaundice improving       Laboratory:  Immunosuppressants     None        Labs within the past  24 hours have been reviewed.    Diagnostic Results:  I have personally reviewed all pertinent imaging studies.    Assessment/Plan:   Femi Enciso is a 53 y.o. male     * S/P liver transplant      53 year old male with history of ESLD 2/2 ETOH cirrhosis who is s/p liver transplant. POD#4    Neuro  -acute change in mental status with confusion and agitation on 11/14, controlled with PRN ativan   -monitor neuro exam when sedation weaned, currently sedated with propofol    CV  -remained off pressors for last 24hrs, continue to monitor and leave off as tolerated  -swan tricia catheter removed  -monitor on telemetry     Resp  -left intubated following OR 11/16 given open abdomen with planned take-back, minimal vent settings  -Vent Mode: A/C  Oxygen Concentration (%):  [] 40  Resp Rate Total:  [0 br/min-42 br/min] 25 br/min  Vt Set:  [320 mL] 320 mL  PEEP/CPAP:  [6 cmH20] 6 cmH20  Mean Airway Pressure:  [7.5 kiX74-94 cmH20] 9.4 cmH20  -ABGs prn  -daily CXR while intubated   -continue to monitor O2 sats   -CXR stable from repeat yesterday     Heme  -s/p transfusion 2 units pRBCs and 2 plts yesterday, H/H stable this morning 9.6/28  -INR 1.3  -q4 CBCs, transfuse products as necessary    ID  -monitor fever and WBC   -f/u cultures   -Abx: Cipro, fluconazole and linezolid     MSK  -critical nature of patient prohibits OOBTC or ambulation     FEN/GI  -replace lytes   -Continue NPO for now  -s/p ex-lap 11/16 requiring take-back for bleeding, abdomen packed and left open with abthera and plans for take-back today  - CT abdomen/pelvis (non-contrast) yesterday appears to demonstrate stability      Endocrine  -insulin as needed. Monitor BG       -CRRT per renal, would like to increase UF of CRRT today     dispo  -continue ICU care, will plan for take-back to OR later today         The patients clinical status was discussed at multidisplinary rounds, involving transplant surgery, transplant medicine, pharmacy, nursing, nutrition,  and social work.    Crys Flores MD  Liver Transplant  [facility]

## 2018-11-18 NOTE — TRANSFER OF CARE
"Anesthesia Transfer of Care Note    Patient: Femi Enciso    Procedure(s) Performed: Procedure(s) (LRB):  LAPAROTOMY, EXPLORATORY, AFTER LIVER TRANSPLANT, LIKELY  ABDOMINAL CLOSURE (N/A)    Patient location: ICU    Anesthesia Type: general    Transport from OR: Continuous ECG monitoring in transport. Continuous SpO2 monitoring in transport. Continuos invasive BP monitoring in transport. Continuous CVP monitoring in transport. Upon arrival to PACU/ICU, patient attached to ventilator and auscultated to confirm bilateral breath sounds and adequate TV    Post pain: adequate analgesia    Post assessment: no apparent anesthetic complications    Post vital signs: stable    Complications: none    Transfer of care protocol was followed      Last vitals:   Visit Vitals  /77   Pulse 91   Temp 36.6 °C (97.9 °F) (Oral)   Resp (!) 25   Ht 5' 10" (1.778 m)   Wt 98.1 kg (216 lb 4.3 oz)   SpO2 99%   BMI 31.03 kg/m²     "

## 2018-11-19 LAB
ALBUMIN SERPL BCP-MCNC: 1.7 G/DL
ALBUMIN SERPL BCP-MCNC: 1.8 G/DL
ALBUMIN SERPL BCP-MCNC: 1.8 G/DL
ALBUMIN SERPL BCP-MCNC: 2 G/DL
ALBUMIN SERPL BCP-MCNC: 2.1 G/DL
ALBUMIN SERPL BCP-MCNC: 2.1 G/DL
ALBUMIN SERPL BCP-MCNC: 2.3 G/DL
ALBUMIN SERPL BCP-MCNC: 2.3 G/DL
ALBUMIN SERPL BCP-MCNC: 2.5 G/DL
ALLENS TEST: ABNORMAL
ALLENS TEST: ABNORMAL
ALP SERPL-CCNC: 169 U/L
ALP SERPL-CCNC: 171 U/L
ALP SERPL-CCNC: 192 U/L
ALP SERPL-CCNC: 198 U/L
ALP SERPL-CCNC: 198 U/L
ALP SERPL-CCNC: 203 U/L
ALT SERPL W/O P-5'-P-CCNC: 59 U/L
ALT SERPL W/O P-5'-P-CCNC: 59 U/L
ALT SERPL W/O P-5'-P-CCNC: 63 U/L
ALT SERPL W/O P-5'-P-CCNC: 74 U/L
ALT SERPL W/O P-5'-P-CCNC: 74 U/L
ALT SERPL W/O P-5'-P-CCNC: 78 U/L
ANION GAP SERPL CALC-SCNC: 3 MMOL/L
ANION GAP SERPL CALC-SCNC: 4 MMOL/L
ANION GAP SERPL CALC-SCNC: 5 MMOL/L
ANION GAP SERPL CALC-SCNC: 6 MMOL/L
ANION GAP SERPL CALC-SCNC: 6 MMOL/L
ANION GAP SERPL CALC-SCNC: 7 MMOL/L
ANION GAP SERPL CALC-SCNC: 7 MMOL/L
ANISOCYTOSIS BLD QL SMEAR: SLIGHT
APTT BLDCRRT: 29.6 SEC
AST SERPL-CCNC: 28 U/L
AST SERPL-CCNC: 28 U/L
AST SERPL-CCNC: 30 U/L
AST SERPL-CCNC: 36 U/L
AST SERPL-CCNC: 37 U/L
AST SERPL-CCNC: 40 U/L
BACTERIA BLD CULT: NORMAL
BACTERIA SPEC ANAEROBE CULT: NORMAL
BASO STIPL BLD QL SMEAR: ABNORMAL
BASOPHILS # BLD AUTO: 0.02 K/UL
BASOPHILS # BLD AUTO: ABNORMAL K/UL
BASOPHILS NFR BLD: 0 %
BASOPHILS NFR BLD: 0.1 %
BASOPHILS NFR BLD: 1 %
BILIRUB DIRECT SERPL-MCNC: 8.6 MG/DL
BILIRUB FLD-MCNC: 11.6 MG/DL
BILIRUB SERPL-MCNC: 10.9 MG/DL
BILIRUB SERPL-MCNC: 11.2 MG/DL
BILIRUB SERPL-MCNC: 11.3 MG/DL
BILIRUB SERPL-MCNC: 12.5 MG/DL
BILIRUB SERPL-MCNC: 14 MG/DL
BILIRUB SERPL-MCNC: 14.7 MG/DL
BODY FLUID SOURCE, BILIRUBIN: NORMAL
BUN SERPL-MCNC: 10 MG/DL
BUN SERPL-MCNC: 15 MG/DL
BUN SERPL-MCNC: 15 MG/DL
BUN SERPL-MCNC: 8 MG/DL
BUN SERPL-MCNC: 8 MG/DL
BUN SERPL-MCNC: 9 MG/DL
BURR CELLS BLD QL SMEAR: ABNORMAL
CALCIUM SERPL-MCNC: 7.2 MG/DL
CALCIUM SERPL-MCNC: 7.3 MG/DL
CALCIUM SERPL-MCNC: 7.4 MG/DL
CALCIUM SERPL-MCNC: 7.5 MG/DL
CALCIUM SERPL-MCNC: 7.6 MG/DL
CALCIUM SERPL-MCNC: 7.6 MG/DL
CALCIUM SERPL-MCNC: 7.7 MG/DL
CHLORIDE SERPL-SCNC: 104 MMOL/L
CHLORIDE SERPL-SCNC: 104 MMOL/L
CHLORIDE SERPL-SCNC: 105 MMOL/L
CHLORIDE SERPL-SCNC: 105 MMOL/L
CHLORIDE SERPL-SCNC: 106 MMOL/L
CO2 SERPL-SCNC: 24 MMOL/L
CO2 SERPL-SCNC: 25 MMOL/L
CO2 SERPL-SCNC: 27 MMOL/L
CO2 SERPL-SCNC: 27 MMOL/L
CO2 SERPL-SCNC: 28 MMOL/L
CREAT SERPL-MCNC: 0.8 MG/DL
CREAT SERPL-MCNC: 0.9 MG/DL
CREAT SERPL-MCNC: 1.1 MG/DL
CREAT SERPL-MCNC: 1.1 MG/DL
DELSYS: ABNORMAL
DELSYS: ABNORMAL
DIFFERENTIAL METHOD: ABNORMAL
EOSINOPHIL # BLD AUTO: 1.8 K/UL
EOSINOPHIL # BLD AUTO: ABNORMAL K/UL
EOSINOPHIL NFR BLD: 11.4 %
EOSINOPHIL NFR BLD: 12 %
EOSINOPHIL NFR BLD: 12 %
EOSINOPHIL NFR BLD: 14 %
EOSINOPHIL NFR BLD: 17 %
ERYTHROCYTE [DISTWIDTH] IN BLOOD BY AUTOMATED COUNT: 16.5 %
ERYTHROCYTE [DISTWIDTH] IN BLOOD BY AUTOMATED COUNT: 16.7 %
ERYTHROCYTE [DISTWIDTH] IN BLOOD BY AUTOMATED COUNT: 16.8 %
ERYTHROCYTE [DISTWIDTH] IN BLOOD BY AUTOMATED COUNT: 17 %
ERYTHROCYTE [DISTWIDTH] IN BLOOD BY AUTOMATED COUNT: 17.1 %
ERYTHROCYTE [SEDIMENTATION RATE] IN BLOOD BY WESTERGREN METHOD: 16 MM/H
ERYTHROCYTE [SEDIMENTATION RATE] IN BLOOD BY WESTERGREN METHOD: 16 MM/H
EST. GFR  (AFRICAN AMERICAN): >60 ML/MIN/1.73 M^2
EST. GFR  (NON AFRICAN AMERICAN): >60 ML/MIN/1.73 M^2
FIO2: 40
FIO2: 40
GGT SERPL-CCNC: 334 U/L
GIANT PLATELETS BLD QL SMEAR: PRESENT
GIANT PLATELETS BLD QL SMEAR: PRESENT
GLUCOSE SERPL-MCNC: 182 MG/DL
GLUCOSE SERPL-MCNC: 188 MG/DL
GLUCOSE SERPL-MCNC: 189 MG/DL
GLUCOSE SERPL-MCNC: 202 MG/DL
GLUCOSE SERPL-MCNC: 202 MG/DL
GLUCOSE SERPL-MCNC: 212 MG/DL
GLUCOSE SERPL-MCNC: 213 MG/DL
GLUCOSE SERPL-MCNC: 227 MG/DL
GLUCOSE SERPL-MCNC: 227 MG/DL
HCO3 UR-SCNC: 26.3 MMOL/L (ref 24–28)
HCO3 UR-SCNC: 27.7 MMOL/L (ref 24–28)
HCT VFR BLD AUTO: 26.7 %
HCT VFR BLD AUTO: 29.4 %
HCT VFR BLD AUTO: 29.7 %
HCT VFR BLD AUTO: 29.9 %
HCT VFR BLD AUTO: 30.3 %
HGB BLD-MCNC: 10 G/DL
HGB BLD-MCNC: 10 G/DL
HGB BLD-MCNC: 10.1 G/DL
HGB BLD-MCNC: 10.1 G/DL
HGB BLD-MCNC: 10.2 G/DL
HGB BLD-MCNC: 9.1 G/DL
HGB BLD-MCNC: 9.7 G/DL
HYPOCHROMIA BLD QL SMEAR: ABNORMAL
IMM GRANULOCYTES # BLD AUTO: 0.73 K/UL
IMM GRANULOCYTES # BLD AUTO: ABNORMAL K/UL
IMM GRANULOCYTES NFR BLD AUTO: 4.6 %
IMM GRANULOCYTES NFR BLD AUTO: ABNORMAL %
INR PPP: 1.2
INR PPP: 1.3
INR PPP: 1.4
LYMPHOCYTES # BLD AUTO: 0.6 K/UL
LYMPHOCYTES # BLD AUTO: ABNORMAL K/UL
LYMPHOCYTES NFR BLD: 0 %
LYMPHOCYTES NFR BLD: 0 %
LYMPHOCYTES NFR BLD: 3.5 %
LYMPHOCYTES NFR BLD: 4 %
LYMPHOCYTES NFR BLD: 4 %
LYMPHOCYTES NFR BLD: 5 %
LYMPHOCYTES NFR BLD: 8 %
MAGNESIUM SERPL-MCNC: 1.7 MG/DL
MAGNESIUM SERPL-MCNC: 2 MG/DL
MAGNESIUM SERPL-MCNC: 2.1 MG/DL
MCH RBC QN AUTO: 29 PG
MCH RBC QN AUTO: 29.5 PG
MCH RBC QN AUTO: 29.5 PG
MCH RBC QN AUTO: 29.6 PG
MCH RBC QN AUTO: 29.6 PG
MCH RBC QN AUTO: 29.7 PG
MCH RBC QN AUTO: 30.4 PG
MCHC RBC AUTO-ENTMCNC: 33 G/DL
MCHC RBC AUTO-ENTMCNC: 33.4 G/DL
MCHC RBC AUTO-ENTMCNC: 33.7 G/DL
MCHC RBC AUTO-ENTMCNC: 33.7 G/DL
MCHC RBC AUTO-ENTMCNC: 33.8 G/DL
MCHC RBC AUTO-ENTMCNC: 33.8 G/DL
MCHC RBC AUTO-ENTMCNC: 34.1 G/DL
MCV RBC AUTO: 87 FL
MCV RBC AUTO: 87 FL
MCV RBC AUTO: 88 FL
MCV RBC AUTO: 89 FL
MCV RBC AUTO: 89 FL
METAMYELOCYTES NFR BLD MANUAL: 1 %
METAMYELOCYTES NFR BLD MANUAL: 1 %
MIN VOL: 10.9
MODE: ABNORMAL
MODE: ABNORMAL
MONOCYTES # BLD AUTO: 0.8 K/UL
MONOCYTES # BLD AUTO: ABNORMAL K/UL
MONOCYTES NFR BLD: 0 %
MONOCYTES NFR BLD: 2 %
MONOCYTES NFR BLD: 3 %
MONOCYTES NFR BLD: 4 %
MONOCYTES NFR BLD: 5.2 %
MYELOCYTES NFR BLD MANUAL: 2 %
NEUTROPHILS # BLD AUTO: 12.1 K/UL
NEUTROPHILS NFR BLD: 71 %
NEUTROPHILS NFR BLD: 75.2 %
NEUTROPHILS NFR BLD: 76 %
NEUTROPHILS NFR BLD: 78 %
NEUTROPHILS NFR BLD: 79 %
NEUTROPHILS NFR BLD: 79 %
NEUTROPHILS NFR BLD: 80 %
NEUTS BAND NFR BLD MANUAL: 1 %
NEUTS BAND NFR BLD MANUAL: 3 %
NEUTS BAND NFR BLD MANUAL: 3 %
NEUTS BAND NFR BLD MANUAL: 6 %
NRBC BLD-RTO: 0 /100 WBC
OVALOCYTES BLD QL SMEAR: ABNORMAL
PCO2 BLDA: 40 MMHG (ref 35–45)
PCO2 BLDA: 58.4 MMHG (ref 35–45)
PEEP: 5
PEEP: 6
PH SMN: 7.28 [PH] (ref 7.35–7.45)
PH SMN: 7.43 [PH] (ref 7.35–7.45)
PHOSPHATE SERPL-MCNC: 1.6 MG/DL
PHOSPHATE SERPL-MCNC: 1.7 MG/DL
PHOSPHATE SERPL-MCNC: 1.7 MG/DL
PHOSPHATE SERPL-MCNC: 1.9 MG/DL
PHOSPHATE SERPL-MCNC: 3.2 MG/DL
PIP: 25
PLATELET # BLD AUTO: 52 K/UL
PLATELET # BLD AUTO: 58 K/UL
PLATELET # BLD AUTO: 59 K/UL
PLATELET # BLD AUTO: 59 K/UL
PLATELET # BLD AUTO: 60 K/UL
PLATELET # BLD AUTO: 60 K/UL
PLATELET # BLD AUTO: 63 K/UL
PLATELET BLD QL SMEAR: ABNORMAL
PMV BLD AUTO: 12.4 FL
PMV BLD AUTO: 13.1 FL
PMV BLD AUTO: 13.2 FL
PMV BLD AUTO: 13.4 FL
PO2 BLDA: 131 MMHG (ref 80–100)
PO2 BLDA: 66 MMHG (ref 80–100)
POC BE: 1 MMOL/L
POC BE: 2 MMOL/L
POC SATURATED O2: 89 % (ref 95–100)
POC SATURATED O2: 99 % (ref 95–100)
POC TCO2: 27 MMOL/L (ref 23–27)
POC TCO2: 29 MMOL/L (ref 23–27)
POCT GLUCOSE: 190 MG/DL (ref 70–110)
POCT GLUCOSE: 208 MG/DL (ref 70–110)
POCT GLUCOSE: 210 MG/DL (ref 70–110)
POCT GLUCOSE: 210 MG/DL (ref 70–110)
POCT GLUCOSE: 218 MG/DL (ref 70–110)
POCT GLUCOSE: 236 MG/DL (ref 70–110)
POIKILOCYTOSIS BLD QL SMEAR: SLIGHT
POLYCHROMASIA BLD QL SMEAR: ABNORMAL
POTASSIUM SERPL-SCNC: 4 MMOL/L
POTASSIUM SERPL-SCNC: 4.1 MMOL/L
POTASSIUM SERPL-SCNC: 4.1 MMOL/L
POTASSIUM SERPL-SCNC: 4.2 MMOL/L
POTASSIUM SERPL-SCNC: 4.3 MMOL/L
POTASSIUM SERPL-SCNC: 4.4 MMOL/L
POTASSIUM SERPL-SCNC: 4.4 MMOL/L
PROT SERPL-MCNC: 3.6 G/DL
PROT SERPL-MCNC: 3.6 G/DL
PROT SERPL-MCNC: 3.7 G/DL
PROT SERPL-MCNC: 3.8 G/DL
PROT SERPL-MCNC: 3.9 G/DL
PROT SERPL-MCNC: 4 G/DL
PROTHROMBIN TIME: 11.9 SEC
PROTHROMBIN TIME: 12.7 SEC
PROTHROMBIN TIME: 13.2 SEC
PROTHROMBIN TIME: 13.2 SEC
PROTHROMBIN TIME: 13.5 SEC
PROTHROMBIN TIME: 14.2 SEC
RBC # BLD AUTO: 2.99 M/UL
RBC # BLD AUTO: 3.34 M/UL
RBC # BLD AUTO: 3.38 M/UL
RBC # BLD AUTO: 3.38 M/UL
RBC # BLD AUTO: 3.42 M/UL
RBC # BLD AUTO: 3.42 M/UL
RBC # BLD AUTO: 3.44 M/UL
SAMPLE: ABNORMAL
SAMPLE: ABNORMAL
SITE: ABNORMAL
SITE: ABNORMAL
SODIUM SERPL-SCNC: 135 MMOL/L
SODIUM SERPL-SCNC: 135 MMOL/L
SODIUM SERPL-SCNC: 136 MMOL/L
SODIUM SERPL-SCNC: 137 MMOL/L
SODIUM SERPL-SCNC: 138 MMOL/L
SP02: 100
TACROLIMUS BLD-MCNC: 2.2 NG/ML
VT: 320
VT: 320
WBC # BLD AUTO: 16.02 K/UL
WBC # BLD AUTO: 17.49 K/UL
WBC # BLD AUTO: 17.49 K/UL
WBC # BLD AUTO: 18.29 K/UL
WBC # BLD AUTO: 20.2 K/UL
WBC # BLD AUTO: 21.88 K/UL
WBC # BLD AUTO: 26.15 K/UL
WBC TOXIC VACUOLES BLD QL SMEAR: PRESENT

## 2018-11-19 PROCEDURE — 37799 UNLISTED PX VASCULAR SURGERY: CPT

## 2018-11-19 PROCEDURE — B4185 PARENTERAL SOL 10 GM LIPIDS: HCPCS | Performed by: STUDENT IN AN ORGANIZED HEALTH CARE EDUCATION/TRAINING PROGRAM

## 2018-11-19 PROCEDURE — 85025 COMPLETE CBC W/AUTO DIFF WBC: CPT

## 2018-11-19 PROCEDURE — 99900026 HC AIRWAY MAINTENANCE (STAT)

## 2018-11-19 PROCEDURE — 25000003 PHARM REV CODE 250: Performed by: STUDENT IN AN ORGANIZED HEALTH CARE EDUCATION/TRAINING PROGRAM

## 2018-11-19 PROCEDURE — 99233 SBSQ HOSP IP/OBS HIGH 50: CPT | Mod: ,,, | Performed by: INTERNAL MEDICINE

## 2018-11-19 PROCEDURE — 63600175 PHARM REV CODE 636 W HCPCS: Performed by: STUDENT IN AN ORGANIZED HEALTH CARE EDUCATION/TRAINING PROGRAM

## 2018-11-19 PROCEDURE — 99900035 HC TECH TIME PER 15 MIN (STAT)

## 2018-11-19 PROCEDURE — 82803 BLOOD GASES ANY COMBINATION: CPT

## 2018-11-19 PROCEDURE — 82977 ASSAY OF GGT: CPT

## 2018-11-19 PROCEDURE — 80053 COMPREHEN METABOLIC PANEL: CPT | Mod: 91

## 2018-11-19 PROCEDURE — 63600175 PHARM REV CODE 636 W HCPCS: Performed by: TRANSPLANT SURGERY

## 2018-11-19 PROCEDURE — 85610 PROTHROMBIN TIME: CPT | Mod: 91

## 2018-11-19 PROCEDURE — 90945 DIALYSIS ONE EVALUATION: CPT

## 2018-11-19 PROCEDURE — 94010 BREATHING CAPACITY TEST: CPT

## 2018-11-19 PROCEDURE — 90945 PR DIALYSIS, NOT HEMO, 1 EVAL: ICD-10-PCS | Mod: ,,, | Performed by: INTERNAL MEDICINE

## 2018-11-19 PROCEDURE — 20000000 HC ICU ROOM

## 2018-11-19 PROCEDURE — 83735 ASSAY OF MAGNESIUM: CPT | Mod: 91

## 2018-11-19 PROCEDURE — 85730 THROMBOPLASTIN TIME PARTIAL: CPT

## 2018-11-19 PROCEDURE — S0028 INJECTION, FAMOTIDINE, 20 MG: HCPCS | Performed by: TRANSPLANT SURGERY

## 2018-11-19 PROCEDURE — 27000221 HC OXYGEN, UP TO 24 HOURS

## 2018-11-19 PROCEDURE — 94761 N-INVAS EAR/PLS OXIMETRY MLT: CPT

## 2018-11-19 PROCEDURE — 99233 PR SUBSEQUENT HOSPITAL CARE,LEVL III: ICD-10-PCS | Mod: 24,,, | Performed by: SURGERY

## 2018-11-19 PROCEDURE — 85027 COMPLETE CBC AUTOMATED: CPT

## 2018-11-19 PROCEDURE — 99232 PR SUBSEQUENT HOSPITAL CARE,LEVL II: ICD-10-PCS | Mod: ,,, | Performed by: NURSE PRACTITIONER

## 2018-11-19 PROCEDURE — 63600175 PHARM REV CODE 636 W HCPCS: Performed by: HOSPITALIST

## 2018-11-19 PROCEDURE — 80100008 HC CRRT DAILY MAINTENANCE

## 2018-11-19 PROCEDURE — 99900017 HC EXTUBATION W/PARAMETERS (STAT)

## 2018-11-19 PROCEDURE — P9045 ALBUMIN (HUMAN), 5%, 250 ML: HCPCS | Mod: JG | Performed by: STUDENT IN AN ORGANIZED HEALTH CARE EDUCATION/TRAINING PROGRAM

## 2018-11-19 PROCEDURE — 99233 PR SUBSEQUENT HOSPITAL CARE,LEVL III: ICD-10-PCS | Mod: ,,, | Performed by: INTERNAL MEDICINE

## 2018-11-19 PROCEDURE — 25000003 PHARM REV CODE 250: Performed by: PHYSICIAN ASSISTANT

## 2018-11-19 PROCEDURE — 80197 ASSAY OF TACROLIMUS: CPT

## 2018-11-19 PROCEDURE — 85007 BL SMEAR W/DIFF WBC COUNT: CPT | Mod: 91

## 2018-11-19 PROCEDURE — 90945 DIALYSIS ONE EVALUATION: CPT | Mod: ,,, | Performed by: INTERNAL MEDICINE

## 2018-11-19 PROCEDURE — 80069 RENAL FUNCTION PANEL: CPT | Mod: 91

## 2018-11-19 PROCEDURE — 84100 ASSAY OF PHOSPHORUS: CPT

## 2018-11-19 PROCEDURE — 83735 ASSAY OF MAGNESIUM: CPT

## 2018-11-19 PROCEDURE — 25000003 PHARM REV CODE 250: Performed by: INTERNAL MEDICINE

## 2018-11-19 PROCEDURE — 82248 BILIRUBIN DIRECT: CPT

## 2018-11-19 PROCEDURE — 80069 RENAL FUNCTION PANEL: CPT

## 2018-11-19 PROCEDURE — 99232 SBSQ HOSP IP/OBS MODERATE 35: CPT | Mod: ,,, | Performed by: NURSE PRACTITIONER

## 2018-11-19 PROCEDURE — 63600175 PHARM REV CODE 636 W HCPCS: Mod: JG | Performed by: PHYSICIAN ASSISTANT

## 2018-11-19 PROCEDURE — 63600175 PHARM REV CODE 636 W HCPCS: Mod: JG

## 2018-11-19 PROCEDURE — 99233 SBSQ HOSP IP/OBS HIGH 50: CPT | Mod: 24,,, | Performed by: SURGERY

## 2018-11-19 PROCEDURE — P9045 ALBUMIN (HUMAN), 5%, 250 ML: HCPCS | Mod: JG

## 2018-11-19 PROCEDURE — 94150 VITAL CAPACITY TEST: CPT

## 2018-11-19 PROCEDURE — A4217 STERILE WATER/SALINE, 500 ML: HCPCS | Performed by: STUDENT IN AN ORGANIZED HEALTH CARE EDUCATION/TRAINING PROGRAM

## 2018-11-19 PROCEDURE — 82247 BILIRUBIN TOTAL: CPT

## 2018-11-19 PROCEDURE — 25000003 PHARM REV CODE 250: Performed by: TRANSPLANT SURGERY

## 2018-11-19 PROCEDURE — 63600175 PHARM REV CODE 636 W HCPCS: Performed by: PEDIATRICS

## 2018-11-19 PROCEDURE — 25000003 PHARM REV CODE 250: Performed by: HOSPITALIST

## 2018-11-19 RX ORDER — VALGANCICLOVIR HYDROCHLORIDE 50 MG/ML
450 POWDER, FOR SOLUTION ORAL
Status: DISCONTINUED | OUTPATIENT
Start: 2018-11-21 | End: 2018-11-19

## 2018-11-19 RX ORDER — ALBUMIN HUMAN 50 G/1000ML
SOLUTION INTRAVENOUS
Status: COMPLETED
Start: 2018-11-19 | End: 2018-11-19

## 2018-11-19 RX ORDER — LINEZOLID 600 MG/1
600 TABLET, FILM COATED ORAL EVERY 12 HOURS
Status: DISCONTINUED | OUTPATIENT
Start: 2018-11-19 | End: 2018-11-21

## 2018-11-19 RX ORDER — OXYCODONE HYDROCHLORIDE 5 MG/1
5 TABLET ORAL EVERY 4 HOURS PRN
Status: DISCONTINUED | OUTPATIENT
Start: 2018-11-19 | End: 2018-12-02

## 2018-11-19 RX ORDER — ALBUMIN HUMAN 50 G/1000ML
25 SOLUTION INTRAVENOUS ONCE
Status: COMPLETED | OUTPATIENT
Start: 2018-11-19 | End: 2018-11-19

## 2018-11-19 RX ORDER — FAMOTIDINE 20 MG/1
20 TABLET, FILM COATED ORAL NIGHTLY
Status: DISCONTINUED | OUTPATIENT
Start: 2018-11-19 | End: 2018-11-22

## 2018-11-19 RX ORDER — HYDROMORPHONE HYDROCHLORIDE 1 MG/ML
0.2 INJECTION, SOLUTION INTRAMUSCULAR; INTRAVENOUS; SUBCUTANEOUS
Status: DISCONTINUED | OUTPATIENT
Start: 2018-11-19 | End: 2018-11-19

## 2018-11-19 RX ORDER — OXYCODONE HYDROCHLORIDE 10 MG/1
10 TABLET ORAL EVERY 4 HOURS PRN
Status: DISCONTINUED | OUTPATIENT
Start: 2018-11-19 | End: 2018-12-02

## 2018-11-19 RX ORDER — TACROLIMUS 1 MG/1
1 CAPSULE ORAL 2 TIMES DAILY
Status: DISCONTINUED | OUTPATIENT
Start: 2018-11-19 | End: 2018-11-22

## 2018-11-19 RX ORDER — HYDROMORPHONE HYDROCHLORIDE 1 MG/ML
0.2 INJECTION, SOLUTION INTRAMUSCULAR; INTRAVENOUS; SUBCUTANEOUS
Status: DISCONTINUED | OUTPATIENT
Start: 2018-11-19 | End: 2018-11-29

## 2018-11-19 RX ORDER — VALGANCICLOVIR 450 MG/1
450 TABLET, FILM COATED ORAL
Status: DISCONTINUED | OUTPATIENT
Start: 2018-11-21 | End: 2018-11-21

## 2018-11-19 RX ORDER — MAGNESIUM SULFATE HEPTAHYDRATE 40 MG/ML
2 INJECTION, SOLUTION INTRAVENOUS
Status: DISPENSED | OUTPATIENT
Start: 2018-11-19 | End: 2018-11-20

## 2018-11-19 RX ORDER — HYDROCODONE BITARTRATE AND ACETAMINOPHEN 500; 5 MG/1; MG/1
TABLET ORAL CONTINUOUS
Status: ACTIVE | OUTPATIENT
Start: 2018-11-19 | End: 2018-11-20

## 2018-11-19 RX ADMIN — POTASSIUM PHOSPHATE, MONOBASIC AND POTASSIUM PHOSPHATE, DIBASIC 15 MMOL: 224; 236 INJECTION, SOLUTION INTRAVENOUS at 05:11

## 2018-11-19 RX ADMIN — INSULIN ASPART 2 UNITS: 100 INJECTION, SOLUTION INTRAVENOUS; SUBCUTANEOUS at 12:11

## 2018-11-19 RX ADMIN — FAMOTIDINE 20 MG: 20 TABLET ORAL at 09:11

## 2018-11-19 RX ADMIN — ALBUMIN HUMAN 25 G: 50 SOLUTION INTRAVENOUS at 09:11

## 2018-11-19 RX ADMIN — HEPARIN SODIUM 5000 UNITS: 5000 INJECTION, SOLUTION INTRAVENOUS; SUBCUTANEOUS at 02:11

## 2018-11-19 RX ADMIN — FAMOTIDINE 20 MG: 10 INJECTION, SOLUTION INTRAVENOUS at 08:11

## 2018-11-19 RX ADMIN — SODIUM CHLORIDE: 0.9 INJECTION, SOLUTION INTRAVENOUS at 01:11

## 2018-11-19 RX ADMIN — ISAVUCONAZONIUM SULFATE 372 MG: 186 CAPSULE ORAL at 05:11

## 2018-11-19 RX ADMIN — DIBASIC SODIUM PHOSPHATE, MONOBASIC POTASSIUM PHOSPHATE AND MONOBASIC SODIUM PHOSPHATE 2 TABLET: 852; 155; 130 TABLET ORAL at 09:11

## 2018-11-19 RX ADMIN — Medication 0.2 MG: at 09:11

## 2018-11-19 RX ADMIN — OXYCODONE HYDROCHLORIDE 10 MG: 10 TABLET ORAL at 03:11

## 2018-11-19 RX ADMIN — HYDROCORTISONE SODIUM SUCCINATE 50 MG: 100 INJECTION, POWDER, FOR SOLUTION INTRAMUSCULAR; INTRAVENOUS at 05:11

## 2018-11-19 RX ADMIN — ISAVUCONAZONIUM SULFATE: 74.4 INJECTION, POWDER, LYOPHILIZED, FOR SOLUTION INTRAVENOUS at 03:11

## 2018-11-19 RX ADMIN — HYDROCORTISONE SODIUM SUCCINATE 50 MG: 100 INJECTION, POWDER, FOR SOLUTION INTRAMUSCULAR; INTRAVENOUS at 09:11

## 2018-11-19 RX ADMIN — TACROLIMUS 1 MG: 1 CAPSULE ORAL at 05:11

## 2018-11-19 RX ADMIN — CIPROFLOXACIN 400 MG: 2 INJECTION, SOLUTION INTRAVENOUS at 08:11

## 2018-11-19 RX ADMIN — CALCIUM GLUCONATE: 94 INJECTION, SOLUTION INTRAVENOUS at 09:11

## 2018-11-19 RX ADMIN — DIBASIC SODIUM PHOSPHATE, MONOBASIC POTASSIUM PHOSPHATE AND MONOBASIC SODIUM PHOSPHATE 2 TABLET: 852; 155; 130 TABLET ORAL at 02:11

## 2018-11-19 RX ADMIN — INSULIN ASPART 2 UNITS: 100 INJECTION, SOLUTION INTRAVENOUS; SUBCUTANEOUS at 05:11

## 2018-11-19 RX ADMIN — SOYBEAN OIL 250 ML: 20 INJECTION, SOLUTION INTRAVENOUS at 09:11

## 2018-11-19 RX ADMIN — ISAVUCONAZONIUM SULFATE: 74.4 INJECTION, POWDER, LYOPHILIZED, FOR SOLUTION INTRAVENOUS at 10:11

## 2018-11-19 RX ADMIN — DIBASIC SODIUM PHOSPHATE, MONOBASIC POTASSIUM PHOSPHATE AND MONOBASIC SODIUM PHOSPHATE 2 TABLET: 852; 155; 130 TABLET ORAL at 08:11

## 2018-11-19 RX ADMIN — ALBUMIN HUMAN 25 G: 0.05 INJECTION, SOLUTION INTRAVENOUS at 09:11

## 2018-11-19 RX ADMIN — INSULIN ASPART 2 UNITS: 100 INJECTION, SOLUTION INTRAVENOUS; SUBCUTANEOUS at 04:11

## 2018-11-19 RX ADMIN — Medication 1 MG: at 11:11

## 2018-11-19 RX ADMIN — HYDROCORTISONE SODIUM SUCCINATE 50 MG: 100 INJECTION, POWDER, FOR SOLUTION INTRAMUSCULAR; INTRAVENOUS at 02:11

## 2018-11-19 RX ADMIN — HEPARIN SODIUM 5000 UNITS: 5000 INJECTION, SOLUTION INTRAVENOUS; SUBCUTANEOUS at 09:11

## 2018-11-19 RX ADMIN — MAGNESIUM SULFATE IN WATER 2 G: 40 INJECTION, SOLUTION INTRAVENOUS at 06:11

## 2018-11-19 RX ADMIN — LINEZOLID 600 MG: 600 TABLET, FILM COATED ORAL at 09:11

## 2018-11-19 RX ADMIN — INSULIN ASPART 4 UNITS: 100 INJECTION, SOLUTION INTRAVENOUS; SUBCUTANEOUS at 09:11

## 2018-11-19 RX ADMIN — SODIUM CHLORIDE: 0.9 INJECTION, SOLUTION INTRAVENOUS at 10:11

## 2018-11-19 RX ADMIN — ALBUMIN HUMAN 25 G: 0.05 INJECTION, SOLUTION INTRAVENOUS at 10:11

## 2018-11-19 RX ADMIN — LINEZOLID 600 MG: 600 INJECTION, SOLUTION INTRAVENOUS at 08:11

## 2018-11-19 RX ADMIN — PROPOFOL 35 MCG/KG/MIN: 10 INJECTION, EMULSION INTRAVENOUS at 03:11

## 2018-11-19 RX ADMIN — POTASSIUM PHOSPHATE, MONOBASIC AND POTASSIUM PHOSPHATE, DIBASIC 15 MMOL: 224; 236 INJECTION, SOLUTION INTRAVENOUS at 01:11

## 2018-11-19 NOTE — NURSING
Pt sent to OR per anesthesia. Connected to portable monitor and ambu bag. All vitals stable. Will resume care when pt returns.

## 2018-11-19 NOTE — PROGRESS NOTES
[facility]  SICU  Progress Note     Patient Name: Femi Enciso  MRN: [id number]  Admission Date: 10/27/2018  Hospital Length of Stay: 22 days  Code Status: Full Code  Primary Care Provider: Primary Doctor No  Post-Op Day 3 Days Post-Op        Subjective:      Hospital/ICU Course:  11/11: s/p liver transplant  11/12 - extubated, weaned off pressors; pulled out all 3 JOSE A drains  11/13 - CRRT held overnight; 1U Plt  11/14 -2u pRBC  11/15- 1u prbc  11/16- OR for bleeding, abthera + JOSE A x 1, 7 PRBC, 3 plt, 1 FFP  11/17- 2U pRBCs and RU plts   11/18- OR for ex lap, hemostasis achieved     Interval History   POD 1 s/p RTOR for re-exploration. No acute events overnight, remained off pressors. Required 2L fluid overnight to maintain pressures.     Scheduled Meds:   ciprofloxacin (CIPRO)400mg/200ml D5W IVPB  400 mg Intravenous Q12H    famotidine (PF)  20 mg Intravenous Daily    heparin (porcine)  5,000 Units Subcutaneous Q8H    hydrocortisone sodium succinate  50 mg Intravenous Q8H    custom IVPB builder   Intravenous Q8H     Followed by    [START ON 11/20/2018] custom IVPB builder   Intravenous Q24H    k phos di & mono-sod phos mono  2 tablet Oral TID    linezolid 600mg/300ml  600 mg Intravenous Q12H    sodium phosphate IVPB  30 mmol Intravenous Once    [START ON 11/21/2018] valganciclovir 50 mg/ml  450 mg Per NG tube Daily      Continuous Infusions:   sodium chloride 0.9% 200 mL/hr at 11/18/18 1115    insulin (HUMAN R) infusion (adults) 0.8 Units/hr (11/18/18 1200)    norepinephrine bitartrate-D5W Stopped (11/17/18 1100)    propofol 30 mcg/kg/min (11/18/18 1230)    TPN ADULT CENTRAL LINE CUSTOM 60 mL/hr at 11/18/18 1200    TPN ADULT CENTRAL LINE CUSTOM        PRN Meds:sodium chloride, sodium chloride, sodium chloride, sodium chloride, sodium chloride, sodium chloride, dextrose 50%, dextrose 50%, fentaNYL, glucagon (human recombinant), glucose, glucose, insulin aspart U-100, magnesium sulfate  IVPB, potassium phosphate IVPB, ramelteon     Review of Systems   Unable to perform ROS: Intubated      Objective:      Vital Signs (Most Recent):  Temp: 97.9 °F (36.6 °C) (11/18/18 1100)  Pulse: 79 (11/18/18 1215)  Resp: (!) 25 (11/18/18 0800)  BP: (!) 100/59 (11/18/18 1200)  SpO2: 100 % (11/18/18 1215) Vital Signs (24h Range):  Temp:  [96.8 °F (36 °C)-98.4 °F (36.9 °C)] 97.9 °F (36.6 °C)  Pulse:  [63-98] 79  Resp:  [10-33] 25  SpO2:  [97 %-100 %] 100 %  BP: ()/(59-80) 100/59  Arterial Line BP: (3-149)/(0-82) 97/50      Weight: 98.1 kg (216 lb 4.3 oz)  Body mass index is 31.03 kg/m².            Intake/Output - Last 3 Shifts        11/16 0700 - 11/17 0659 11/17 0700 - 11/18 0659 11/18 0700 - 11/19 0659     I.V. (mL/kg) 7797.6 (79.5) 8108.2 (82.7) 773 (7.9)     Blood 2456 1081       NG/GT   60       TPN   461       Total Intake(mL/kg) 76211.6 (104.5) 9710.2 (99) 773 (7.9)     Urine (mL/kg/hr)   10 (0) 0 (0)     Drains 120 305 160     Other 4223 7434 2851     Stool           Blood 100         Total Output 4443 7749 3011     Net +5810.6 +1961.2 -2238                         Physical Exam   Constitutional: He appears well-developed.   jaundiced   HENT:   Head: Normocephalic and atraumatic.   Eyes: Scleral icterus is present.   Cardiovascular: Normal rate, regular rhythm and intact distal pulses.   Pulmonary/Chest: Effort normal. No respiratory distress.   On vent   Vent Mode: A/C  Oxygen Concentration (%):  (40-99) 40  Resp Rate Total:  (16 br/min-37 br/min) 17 br/min  Vt Set:  (320 mL) 320 mL  PEEP/CPAP:  (5 cmH20) 6 cmH20  Mean Airway Pressure:  (7.8 cmH20-10 cmH20) 8.6 cmH20     Abdominal: Soft. He exhibits no distension.   Abdominal dressings with some serous oozing through dressing.   JOSE A drains in place with serous output.   Musculoskeletal: He exhibits edema.   Neurological:   Propofol held this AM, following commands  Skin: Skin is warm and dry.   Jaundice improving         Laboratory:       Immunosuppressants      None          Labs within the past 24 hours have been reviewed.     Diagnostic Results:  I have personally reviewed all pertinent imaging studies.     Assessment/Plan:   Femi Enciso is a 53 y.o. male          * S/P liver transplant        53 year old male with history of ESLD 2/2 ETOH cirrhosis who is s/p liver transplant. POD#5     Neuro  -off propofol this AM, following commands   -PRN fentanyl     CV  -remains off pressors, continue to monitor and leave off as tolerated   -swan tricia catheter removed  -monitor on telemetry   -required 2L fluid overnight.      Resp  -left intubated following OR 11/16 given open abdomen with planned take-back, minimal vent settings  -Vent Mode: A/C  Oxygen Concentration (%):  [] 40  Resp Rate Total:  [0 br/min-42 br/min] 25 br/min  Vt Set:  [320 mL] 320 mL  PEEP/CPAP:  [6 cmH20] 6 cmH20  Mean Airway Pressure:  [7.5 kqC15-96 cmH20] 9.4 cmH20  -ABGs prn  -daily CXR while intubated   -continue to monitor O2 sats   -CXR improved from repeat yesterday      Heme  -Monitor H/H  -INR 1.3  -q4 CBCs, transfuse products as necessary     ID  -monitor fever and WBC   -f/u cultures   -Abx: Cipro, fluconazole and linezolid      MSK  -critical nature of patient prohibits OOBTC or ambulation      FEN/GI  -replace lytes   -Continue NPO for now  - TPN @ 60  -s/p ex-lap 11/16 and 11/18   - JOSE A drain in place 110/hr overnight decreased to 50/hr this AM  - CT abdomen/pelvis (non-contrast) yesterday appears to demonstrate stability      Endocrine  -insulin gtt 1.2U, Monitor BG        -CRRT per renal - UF ranges from 250-350 titrated according to BP  -minimal dark red UOP     PPX:  - heparin q8  -famotidine     dispo  -continue ICU care

## 2018-11-19 NOTE — SUBJECTIVE & OBJECTIVE
[facility]  Liver Transplant  Progress Note     Patient Name: Femi Enciso  MRN: [id number]  Admission Date: 10/27/2018  Hospital Length of Stay: 22 days  Code Status: Full Code  Primary Care Provider: Primary Doctor No  Post-Op Day 3 Days Post-Op        Subjective:      Hospital/ICU Course:  11/11: s/p liver transplant  11/12 - extubated, weaned off pressors; pulled out all 3 JOSE A drains  11/13 - CRRT held overnight; 1U Plt  11/14 -2u pRBC  11/15- 1u prbc  11/16- OR for bleeding, abthera + JOSE A x 1, 7 PRBC, 3 plt, 1 FFP  11/17- 2U pRBCs and RU plts   11/18- OR for ex lap, hemostasis achieved     Interval History   POD 1 s/p RTOR for re-exploration. No acute events overnight, remained off pressors. Required 2L fluid overnight to maintain pressures.     Scheduled Meds:   ciprofloxacin (CIPRO)400mg/200ml D5W IVPB  400 mg Intravenous Q12H    famotidine (PF)  20 mg Intravenous Daily    heparin (porcine)  5,000 Units Subcutaneous Q8H    hydrocortisone sodium succinate  50 mg Intravenous Q8H    custom IVPB builder   Intravenous Q8H     Followed by    [START ON 11/20/2018] custom IVPB builder   Intravenous Q24H    k phos di & mono-sod phos mono  2 tablet Oral TID    linezolid 600mg/300ml  600 mg Intravenous Q12H    sodium phosphate IVPB  30 mmol Intravenous Once    [START ON 11/21/2018] valganciclovir 50 mg/ml  450 mg Per NG tube Daily      Continuous Infusions:   sodium chloride 0.9% 200 mL/hr at 11/18/18 1115    insulin (HUMAN R) infusion (adults) 0.8 Units/hr (11/18/18 1200)    norepinephrine bitartrate-D5W Stopped (11/17/18 1100)    propofol 30 mcg/kg/min (11/18/18 1230)    TPN ADULT CENTRAL LINE CUSTOM 60 mL/hr at 11/18/18 1200    TPN ADULT CENTRAL LINE CUSTOM        PRN Meds:sodium chloride, sodium chloride, sodium chloride, sodium chloride, sodium chloride, sodium chloride, dextrose 50%, dextrose 50%, fentaNYL, glucagon (human recombinant), glucose, glucose, insulin aspart U-100,  magnesium sulfate IVPB, potassium phosphate IVPB, ramelteon     Review of Systems   Unable to perform ROS: Intubated      Objective:      Vital Signs (Most Recent):  Temp: 97.9 °F (36.6 °C) (11/18/18 1100)  Pulse: 79 (11/18/18 1215)  Resp: (!) 25 (11/18/18 0800)  BP: (!) 100/59 (11/18/18 1200)  SpO2: 100 % (11/18/18 1215) Vital Signs (24h Range):  Temp:  [96.8 °F (36 °C)-98.4 °F (36.9 °C)] 97.9 °F (36.6 °C)  Pulse:  [63-98] 79  Resp:  [10-33] 25  SpO2:  [97 %-100 %] 100 %  BP: ()/(59-80) 100/59  Arterial Line BP: (3-149)/(0-82) 97/50      Weight: 98.1 kg (216 lb 4.3 oz)  Body mass index is 31.03 kg/m².            Intake/Output - Last 3 Shifts        11/16 0700 - 11/17 0659 11/17 0700 - 11/18 0659 11/18 0700 - 11/19 0659     I.V. (mL/kg) 7797.6 (79.5) 8108.2 (82.7) 773 (7.9)     Blood 2456 1081       NG/GT   60       TPN   461       Total Intake(mL/kg) 73922.6 (104.5) 9710.2 (99) 773 (7.9)     Urine (mL/kg/hr)   10 (0) 0 (0)     Drains 120 305 160     Other 4223 7434 2851     Stool           Blood 100         Total Output 4443 7749 3011     Net +5810.6 +1961.2 -2238                         Physical Exam   Constitutional: He appears well-developed.   jaundiced   HENT:   Head: Normocephalic and atraumatic.   Eyes: Scleral icterus is present.   Cardiovascular: Normal rate, regular rhythm and intact distal pulses.   Pulmonary/Chest: Effort normal. No respiratory distress.   On vent   Vent Mode: A/C  Oxygen Concentration (%):  (40-99) 40  Resp Rate Total:  (16 br/min-37 br/min) 17 br/min  Vt Set:  (320 mL) 320 mL  PEEP/CPAP:  (5 cmH20) 6 cmH20  Mean Airway Pressure:  (7.8 cmH20-10 cmH20) 8.6 cmH20     Abdominal: Soft. He exhibits no distension.   Abdominal dressings with some serous oozing through dressing.   JOSE A drains in place with serous output.   Musculoskeletal: He exhibits edema.   Neurological:   Propofol held this AM, following commands  Skin: Skin is warm and dry.   Jaundice improving         Laboratory:       Immunosuppressants      None          Labs within the past 24 hours have been reviewed.     Diagnostic Results:  I have personally reviewed all pertinent imaging studies.     Assessment/Plan:   Femi Enciso is a 53 y.o. male          * S/P liver transplant        53 year old male with history of ESLD 2/2 ETOH cirrhosis who is s/p liver transplant. POD#5     Neuro  -off propofol this AM, following commands      CV  -remains off pressors, continue to monitor and leave off as tolerated   -swan tricia catheter removed  -monitor on telemetry   -required 2L fluid overnight.      Resp  -left intubated following OR 11/16 given open abdomen with planned take-back, minimal vent settings  -Vent Mode: A/C  Oxygen Concentration (%):  [] 40  Resp Rate Total:  [0 br/min-42 br/min] 25 br/min  Vt Set:  [320 mL] 320 mL  PEEP/CPAP:  [6 cmH20] 6 cmH20  Mean Airway Pressure:  [7.5 bkV81-59 cmH20] 9.4 cmH20  -ABGs prn  -daily CXR while intubated   -continue to monitor O2 sats   -CXR improved from repeat yesterday      Heme  -Monitor H/H  -INR 1.3  -q4 CBCs, transfuse products as necessary     ID  -monitor fever and WBC   -f/u cultures   -Abx: Cipro, fluconazole and linezolid      MSK  -critical nature of patient prohibits OOBTC or ambulation      FEN/GI  -replace lytes   -Continue NPO for now  - TPN @ 60  -s/p ex-lap 11/16 and 11/18   - CT abdomen/pelvis (non-contrast) yesterday appears to demonstrate stability      Endocrine  -insulin as needed. Monitor BG        -CRRT per renal     PPX:  - heparin q8  -famotidine     dispo  -continue ICU care

## 2018-11-19 NOTE — PROGRESS NOTES
"[facility]  Endocrinology  Progress Note    Admit Date: 10/27/2018     Reason for Consult: Management of Hyperglycemia and type 2 DM    Surgical Procedure and Date: liver transplant 11/11/18; exploratory lap and liver biopsy on 11/16/18      Diabetes diagnosis year: ~ 3-4 years ago     Home Diabetes Medications:  none; previously on metformin     How often checking glucose at home? DORIAN   BG readings on regimen: DORIAN  Hypoglycemia on the regimen?  n/a  Missed doses on regimen? n/a    Diabetes Complications include:     DORIAN    Complicating diabetes co morbidities:   CIRRHOSIS and Glucocorticoid use       HPI:   Patient is a 53 y.o. male with a diagnosis of ESLD secondary to ETOH abuse and DEL with ESRD with RRT. Patient is now s/p liver transplant. Endocrinology consulted for BG management.  Patient reports having DM x 1 year, takes Metformin 1000 mg BID.      Lab Results   Component Value Date    HGBA1C 4.4 11/09/2018             Interval HPI:   Overnight events:  Remains in ICU, extubated.  NAEON. BG slightly above goal on insulin infusion at 1.2 u/hr. Hydrocortisone 50 mg every 8 hours.   Eating:   NPO  Nausea: No  Hypoglycemia and intervention: No  Fever: No  TPN: Yes at 60 cc/hr     BP (!) 90/55   Pulse 88   Temp 98.1 °F (36.7 °C) (Oral)   Resp (!) 22   Ht 5' 10" (1.778 m)   Wt 98.1 kg (216 lb 4.3 oz)   SpO2 100%   BMI 31.03 kg/m²      Labs Reviewed and Include    Recent Labs   Lab 11/19/18  0734   *   CALCIUM 7.3*   ALBUMIN 1.7*   PROT 3.6*   *   K 4.3   CO2 24      BUN 8   CREATININE 0.8   ALKPHOS 198*   ALT 74*   AST 36   BILITOT 11.3*     Lab Results   Component Value Date    WBC 20.20 (H) 11/19/2018    HGB 10.2 (L) 11/19/2018    HCT 30.3 (L) 11/19/2018    MCV 88 11/19/2018    PLT 59 (L) 11/19/2018     No results for input(s): TSH, FREET4 in the last 168 hours.  Lab Results   Component Value Date    HGBA1C 4.4 11/09/2018       Nutritional status:   Body mass index " is 31.03 kg/m².  Lab Results   Component Value Date    ALBUMIN 1.7 (L) 11/19/2018    ALBUMIN 1.8 (L) 11/19/2018    ALBUMIN 1.8 (L) 11/19/2018     Lab Results   Component Value Date    PREALBUMIN 7 (L) 10/27/2018       Estimated Creatinine Clearance: 125.4 mL/min (based on SCr of 0.8 mg/dL).    Accu-Checks  Recent Labs     11/17/18  1754 11/17/18  2359 11/18/18  0602 11/18/18  0815 11/18/18  1210 11/18/18  1618 11/18/18  2021 11/19/18  0001 11/19/18  0528 11/19/18  0743   POCTGLUCOSE 189* 255* 235* 241* 256* 305* 211* 210* 210* 208*       Current Medications and/or Treatments Impacting Glycemic Control  Immunotherapy:    Immunosuppressants     None        Steroids:   Hormones (From admission, onward)    Start     Stop Route Frequency Ordered    11/17/18 1400  hydrocortisone sodium succinate injection 50 mg      -- IV Every 8 hours 11/17/18 0859    11/09/18 1345  methylPREDNISolone sodium succinate injection 500 mg  (Med - Immunosuppression Induction Therapy (Methylprednisolone))      11/10 0144 IV Once pre-op 11/09/18 1236        Pressors:    Autonomic Drugs (From admission, onward)    Start     Stop Route Frequency Ordered    11/16/18 1615  norepinephrine 4 mg in dextrose 5% 250 mL infusion (premix) (titrating)     Question Answer Comment   Titrate by: (in mcg/kg/min) 5    Titrate interval: (in minutes) 15    Titrate to maintain: (MAP or SBP) SBP    Greater than: (in mmHg) 100    Maximum dose: (in mcg/kg/min) 15        -- IV Continuous 11/16/18 1509        Hyperglycemia/Diabetes Medications:   Antihyperglycemics (From admission, onward)    Start     Stop Route Frequency Ordered    11/18/18 0830  insulin regular (Humulin R) 100 Units in sodium chloride 0.9% 100 mL infusion      -- IV Continuous 11/18/18 0726    11/18/18 0826  insulin aspart U-100 pen 0-10 Units      -- SubQ As needed (PRN) 11/18/18 0726          ASSESSMENT and PLAN    * S/P liver transplant    Managed per LTS.   avoid hypoglycemia  Optimize BG  control for surgical wound healing.     Lab Results   Component Value Date    ALT 69 (H) 11/18/2018    AST 25 11/18/2018     (H) 11/18/2018    ALKPHOS 142 (H) 11/18/2018    BILITOT 11.4 (H) 11/18/2018            Type 2 diabetes mellitus without complication    BG goal 140-180      Increase IV transition insulin infusion at 1.4 u/hr.   BG monitoring every 4 hours and moderate dose correction scale.     Discharge plans- TBD     Acute hyperglycemia-resolved as of 11/18/2018      BG goal 140-180.     Start IV transition insulin infusion at 0.8 u/hr.   BG monitoring every 4 hours and moderate dose correction scale.     Discharge plans- TBD     Adrenal cortical steroids causing adverse effect in therapeutic use    On standard steroid taper per transplant team; may elevate BG readings         DEL (acute kidney injury)    Avoid insulin stacking and hypoglycemia.  CRRT per nephrology  Lab Results   Component Value Date    CREATININE 0.9 11/18/2018            Acute renal failure    Avoid insulin stacking and hypoglycemia.         Prophylactic immunotherapy    May increase insulin resistance.            Tessa Falk NP  Endocrinology  [facility]

## 2018-11-19 NOTE — PROGRESS NOTES
Pt extubated to 3 L Nasal Cannula by RT per MD order. Following extubation, pt is AAO x4, bilateral breath sounds noted. Restraints removed safely w/o injury. Pt's family brought to bedside, updated on the PoC for remainder of shift.

## 2018-11-19 NOTE — PROGRESS NOTES
" [facility]  Adult Nutrition  Progress Note    SUMMARY       Recommendations  Recommendation/Intervention:   1. As medically able, ADAT to Diabetic, Renal with texture per SLP.   2. If unable to advance diet and patient to remain on TPN, recommend modifying to 100g AA and 350g Dextrose + IV lipids daily - to provide 2090 kcal/day and 100g protein/day.   3. TSU RD to provide post-transplant diet education when appropriate.   RD to monitor.    Goals: Patient to receive nutrition by RD follow-up  Nutrition Goal Status: goal met  Communication of RD Recs: (POC)    Reason for Assessment  Reason for Assessment: RD follow-up  Diagnosis: transplant/postoperative complications(s/p OLTx )  Relevant Medical History: ESLD 2/2 alcoholic cirrhosis, ESRD on HD, DM2  Interdisciplinary Rounds: did not attend  General Information Comments: Patient extubated this morning. Went to OR  for bleeding, abdomen left open. Now POD#1 s/p ex lap and abdominal closure. On CRRT. TPN running. (NFPE completed 10/31, patient with moderate malnutrition.)  Nutrition Discharge Planning: TSU RD to provide post-transplant diet education when appropriate.    Nutrition Risk Screen  Nutrition Risk Screen: no indicators present    Nutrition/Diet History  Patient Reported Diet/Restrictions/Preferences: general  Do you have any cultural, spiritual, Amish conflicts, given your current situation?: none  Factors Affecting Nutritional Intake: NPO    Anthropometrics  Temp: 98.6 °F (37 °C)  Height Method: Stated  Height: 5' 10" (177.8 cm)  Height (inches): 70 in  Weight Method: Bed Scale  Weight: 98.1 kg (216 lb 4.3 oz)  Weight (lb): 216.27 lb  Ideal Body Weight (IBW), Male: 166 lb  % Ideal Body Weight, Male (lb): 119.13 lb  BMI (Calculated): 28.4  BMI Grade: 25 - 29.9 - overweight  Usual Body Weight (UBW), k.5 kg  % Usual Body Weight: 86.67  % Weight Change From Usual Weight: -13.51 %    Lab/Procedures/Meds  Pertinent Labs " Reviewed: reviewed  Pertinent Labs Comments: Glu 202, POCT Glu 208-211, HgbA1c 4.4, Ca 7.2, Phos 1.9, Alb 1.8, T. bili 10.9  Pertinent Medications Reviewed: reviewed  Pertinent Medications Comments: famotidine, insulin drip, levophed    Physical Findings/Assessment  Overall Physical Appearance: on oxygen therapy, loss of muscle mass, loss of subcutaneous fat  Oral/Mouth Cavity: tooth/teeth missing  Skin: edema, incision(s)    Estimated/Assessed Needs  Weight Used For Calorie Calculations: 83.7 kg (184 lb 8.4 oz)(dosing weight)  Energy Calorie Requirements (kcal): 2110 kcal/day  Energy Need Method: [place]-St Jeor(x 1.25)  Protein Requirements:  g/day(1.0-1.2 g/kg)  Weight Used For Protein Calculations: 83.7 kg (184 lb 8.4 oz)(dosing weight)  Fluid Requirements (mL): 1 mL/kcal or per MD  Fluid Need Method: RDA Method  RDA Method (mL): 2110    Nutrition Prescription Ordered  Current Diet Order: NPO  Current Nutrition Support Formula Ordered: (Custom TPN 125g AA and 325g Dextrose)  Current Nutrition Support Rate Ordered: 60 (ml)  Current Nutrition Support Frequency Ordered: mL/hr    Evaluation of Received Nutrient/Fluid Intake  Parenteral Calories (kcal): 1605  Parenteral Protein (gm): 125  Parenteral Fluid (mL): 1440  GIR (Glucose Infusion Rate) (mg/kg/min): 2.7 mg/kg/min  % Kcal Needs: 76  % Protein Needs: 124  I/O: +9.4L since admit  Energy Calories Required: not meeting needs  Protein Required: exceed needs  Fluid Required: (per MD)  Comments: LBM 11/15  Tolerance: tolerating  % Intake of Estimated Energy Needs: 75 - 100 %  % Meal Intake: NPO    Nutrition Risk  Level of Risk/Frequency of Follow-up: low(1x/week)     Assessment and Plan  Moderate protein malnutrition    Nutrition Problem  Malnutrition in the context of Chronic Illness/Injury    Related to (etiology):  Cirrhosis and poor appetite    Signs and Symptoms (as evidenced by):  Energy Intake: <75% of estimated energy requirement for 2 months  Body Fat  Depletion: moderate depletion of orbitals and triceps   Muscle Mass Depletion: moderate depletion of temples, clavicle region and scapular region   Weight Loss: 14% x 2 months (per patient, unable to verify per chart review)    Interventions/Recommendations (treatment strategy):  Full assessment completed, see RD Note 11/19/2018.    Nutrition Diagnosis Status:  Continues     Monitor and Evaluation  Food and Nutrient Intake: energy intake, parenteral nutrition intake  Food and Nutrient Adminstration: diet order, enteral and parenteral nutrition administration  Knowledge/Beliefs/Attitudes: food and nutrition knowledge/skill  Physical Activity and Function: nutrition-related ADLs and IADLs  Anthropometric Measurements: weight, weight change  Biochemical Data, Medical Tests and Procedures: electrolyte and renal panel, gastrointestinal profile, glucose/endocrine profile, inflammatory profile  Nutrition-Focused Physical Findings: overall appearance     Nutrition Follow-Up  RD Follow-up?: Yes

## 2018-11-19 NOTE — PROGRESS NOTES
[facility]  Nephrology  Progress Note    Patient Name: Femi Enciso  MRN: [id number]  Admission Date: 10/27/2018  Hospital Length of Stay: 22 days  Attending Provider: Femi Patel MD   Primary Care Physician: Primary Doctor No  Principal Problem:S/P liver transplant    Subjective:     HPI: 52 y/o man with DM2 presents to the ED with family for liver failure (likely due to EtOH abundant history of drinking - diagnosed in Sept 2018 in [place]).  He reports jaundice, generalized weakness, nausea, diarrhea, and decreased appetite since Sept 2018.  He is from [place], [place], and Dr. Sharma (patients physician) recommended bringing him to hospital for evaluation.  Patient denies any fever, chills, vomiting, chest pain, palpitations, SOB, abdominal pain.      Nephrology consulted for evaluation/management Adri.     Interval History:   Remains on mech ventilation. Improved hemodynamically off pressors overnight. OR. CVP 6 remains anuric. Fluid balance 2.3 lts gain. Hb stable 10.3 g    Review of patient's allergies indicates:   Allergen Reactions    Penicillins Nausea And Vomiting and Rash     Current Facility-Administered Medications   Medication Frequency    0.9%  NaCl infusion (CRRT USE ONLY) Continuous    0.9%  NaCl infusion (for blood administration) Q24H PRN    0.9%  NaCl infusion (for blood administration) Q24H PRN    0.9%  NaCl infusion (for blood administration) Q24H PRN    0.9%  NaCl infusion (for blood administration) Q24H PRN    0.9%  NaCl infusion (for blood administration) Q24H PRN    0.9%  NaCl infusion (for blood administration) Q24H PRN    ciprofloxacin (CIPRO)400mg/200ml D5W IVPB 400 mg Q12H    dextrose 50% injection 12.5 g PRN    dextrose 50% injection 25 g PRN    famotidine (PF) injection 20 mg Daily    fentaNYL injection 25 mcg Q1H PRN    glucagon (human recombinant) injection 1 mg PRN    glucose chewable tablet 16 g PRN    glucose chewable tablet 24 g PRN    heparin  (porcine) injection 5,000 Units Q8H    hydrocortisone sodium succinate injection 50 mg Q8H    insulin aspart U-100 pen 0-10 Units PRN    insulin regular (Humulin R) 100 Units in sodium chloride 0.9% 100 mL infusion Continuous    isavuconazonium sulfate 372 mg in sodium chloride 0.9% 250 mL Q8H    Followed by    [START ON 11/20/2018] isavuconazonium sulfate 372 mg in sodium chloride 0.9% 250 mL Q24H    k phos di & mono-sod phos mono 250 mg tablet 2 tablet TID    linezolid 600mg/300ml 600 mg/300 mL IVPB 600 mg Q12H    magnesium sulfate 2g in water 50mL IVPB (premix) PRN    norepinephrine 4 mg in dextrose 5% 250 mL infusion (premix) (titrating) Continuous    potassium phosphate 15 mmol in dextrose 5 % 250 mL infusion BID PRN    propofol (DIPRIVAN) 10 mg/mL infusion Continuous    ramelteon tablet 8 mg Nightly PRN    TPN ADULT CENTRAL LINE CUSTOM Continuous    [START ON 11/21/2018] valganciclovir 50 mg/ml oral solution 450 mg Daily       Objective:     Vital Signs (Most Recent):  Temp: 98.8 °F (37.1 °C) (11/18/18 1900)  Pulse: 86 (11/18/18 2108)  Resp: (!) 25 (11/18/18 0800)  BP: 107/70 (11/18/18 1800)  SpO2: 100 % (11/18/18 2108)  O2 Device (Oxygen Therapy): ventilator (11/18/18 2108) Vital Signs (24h Range):  Temp:  [97.6 °F (36.4 °C)-98.8 °F (37.1 °C)] 98.8 °F (37.1 °C)  Pulse:  [67-96] 86  Resp:  [16-33] 25  SpO2:  [97 %-100 %] 100 %  BP: (100-145)/(59-80) 107/70  Arterial Line BP: ()/(47-82) 136/69     Weight: 98.1 kg (216 lb 4.3 oz) (11/15/18 0500)  Body mass index is 31.03 kg/m².  Body surface area is 2.2 meters squared.    I/O last 3 completed shifts:  In: 06760.2 [I.V.:90175.2; Blood:1367; NG/GT:80]  Out: 44524 [Urine:17; Drains:855; Other:77329]    Physical Exam   Constitutional: He appears well-developed. He appears cachectic. He is cooperative. No distress. He is sedated and intubated.   Pleasantly disoriented but improved from yesterday.    HENT:   Head: Normocephalic and atraumatic.    Eyes: Conjunctivae are normal. Pupils are equal, round, and reactive to light.   Neck: Trachea normal. Neck supple. No JVD present.   Cardiovascular: Normal rate, regular rhythm, S1 normal, S2 normal and normal pulses. Exam reveals no gallop and no friction rub.   No murmur heard.  Pulmonary/Chest: Effort normal. He is intubated. He has decreased breath sounds in the right lower field and the left lower field. He has no rhonchi.   Abdominal: Soft. He exhibits distension. He exhibits no ascites. Bowel sounds are decreased. There is no tenderness.       Musculoskeletal: Normal range of motion. He exhibits edema (edema+ trace pitting sacral).   Neurological:   Pleasantly disoriented no focal deficit.    Skin: Skin is warm and dry. Capillary refill takes less than 2 seconds.   Psychiatric: He has a normal mood and affect. His behavior is normal.   Vitals reviewed.      Significant Labs:  ABGs:   Recent Labs   Lab 11/18/18  1615   PH 7.366   PCO2 45.7*   HCO3 26.1   POCSATURATED 93*   BE 1     BMP:   Recent Labs   Lab 11/18/18  1335  11/18/18 1945   *   < > 245*      < > 106   CO2 23   < > 25   BUN 10   < > 12   CREATININE 0.9   < > 0.9   CALCIUM 7.5*   < > 7.7*   MG 1.9  --   --     < > = values in this interval not displayed.     Cardiac Markers: No results for input(s): CKMB, TROPONINT, MYOGLOBIN in the last 168 hours.  CBC:   Recent Labs   Lab 11/18/18 1945   WBC 12.11   RBC 3.43*   HGB 10.3*   HCT 31.1*   PLT 63*   MCV 91   MCH 30.0   MCHC 33.1     CMP:   Recent Labs   Lab 11/18/18 1945   *   CALCIUM 7.7*   ALBUMIN 2.1*   PROT 4.0*      K 4.0   CO2 25      BUN 12   CREATININE 0.9   ALKPHOS 165*   ALT 85*   AST 42*   BILITOT 10.7*     Coagulation:   Recent Labs   Lab 11/18/18  0352  11/18/18 1945   INR  --    < > 1.2   APTT 31.3  --   --     < > = values in this interval not displayed.     No results for input(s): COLORU, CLARITYU, SPECGRAV, PHUR, PROTEINUA, GLUCOSEU,  BILIRUBINCON, BLOODU, WBCU, RBCU, BACTERIA, MUCUS, NITRITE, LEUKOCYTESUR, UROBILINOGEN, HYALINECASTS in the last 168 hours.  All labs within the past 24 hours have been reviewed.     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   - s/p OHLTx 11/11/2018   - remains anuric   - Had intra-op SLED  - CVP 6 this am    Plan:  - Will cont SLED for metabolic clearance, will continue and reassess in am   - Hemodynamically improved off pressors  - -350 ml/hr as tolerated will adjust once he settles post op  - Remains anuric  - Strict I/O and chart  - Avoid nephrotoxic medications  - please keep Hb > 7 gm/dL or higher if symptomatic  - Medication doses adjusted to GFR           Thank you for your consult. I will follow-up with patient. Please contact us if you have any additional questions.    Nikolay Nino MD  Nephrology  [facility]

## 2018-11-19 NOTE — PROGRESS NOTES
[facility]  Infectious Disease  Progress Note    Patient Name: Femi Enciso  MRN: [id number]  Admission Date: 10/27/2018  Length of Stay: 22 days  Attending Physician: Femi Patel MD  Primary Care Provider: Primary Doctor No    Isolation Status: Contact  Assessment/Plan:      Leucocytosis    Plan:     1. For now keep on zyvox for previous VRE urinary infection, cipro and diflucan.   2.  Repeat WBC already coming down - leucocytosis likely due to recent surgeries and perhaps not an infectious process - stable today - await cultures from today's surgery     ID will follow with you and will ask Dr. Ireland for advice over long-term fungal/mold prophylaxis          Anticipated Disposition:     Thank you for your consult. I will follow-up with patient. Please contact us if you have any additional questions.    Lui Piña MD  Infectious Disease  [facility]    Subjective:     Principal Problem:S/P liver transplant    HPI: 54 y/o M h/o DM2, ETOH dependence c/b hepatitis admitted 10/27 for acute liver failure (c/b PSE, HRS, hepatic hydrothorax) and transplant evaluation  s/p DBDLT 11/11/18( CMV D+/R-, steroid induction, MMF/tacro maintenance) c/b significant blood product transfusions now extubated and ID consulted for delerium  Interval History: Patient in the OR - chart reviewed only     Review of Systems  Objective:     Vital Signs (Most Recent):  Temp: 99.4 °F (37.4 °C) (11/18/18 2300)  Pulse: 89 (11/18/18 2300)  Resp: (!) 25 (11/18/18 0800)  BP: (!) 87/58 (11/18/18 2300)  SpO2: 100 % (11/18/18 2300) Vital Signs (24h Range):  Temp:  [97.9 °F (36.6 °C)-99.4 °F (37.4 °C)] 99.4 °F (37.4 °C)  Pulse:  [69-96] 89  Resp:  [16-33] 25  SpO2:  [97 %-100 %] 100 %  BP: ()/(56-79) 87/58  Arterial Line BP: ()/(47-82) 113/55     Weight: 98.1 kg (216 lb 4.3 oz)  Body mass index is 31.03 kg/m².    Estimated Creatinine Clearance: 125.4 mL/min (based on SCr of 0.8 mg/dL).    Physical  Exam   No exam done - in the OR     Significant Labs: All pertinent labs within the past 24 hours have been reviewed.    Significant Imaging: I have reviewed all pertinent imaging results/findings within the past 24 hours.

## 2018-11-19 NOTE — SUBJECTIVE & OBJECTIVE
Interval History: Improved with regards to AMS today per family. Afebrile. GPC on washout stains.    Review of Systems   Unable to perform ROS: Mental status change     Objective:     Vital Signs (Most Recent):  Temp: 98.6 °F (37 °C) (11/19/18 1100)  Pulse: 100 (11/19/18 1715)  Resp: (!) 21 (11/19/18 1715)  BP: 104/62 (11/19/18 1700)  SpO2: 100 % (11/19/18 1715) Vital Signs (24h Range):  Temp:  [98.1 °F (36.7 °C)-99.4 °F (37.4 °C)] 98.6 °F (37 °C)  Pulse:  [] 100  Resp:  [15-30] 21  SpO2:  [100 %] 100 %  BP: ()/(50-72) 104/62  Arterial Line BP: ()/(44-69) 129/58     Weight: 98.1 kg (216 lb 4.3 oz)  Body mass index is 31.03 kg/m².    Estimated Creatinine Clearance: 111.4 mL/min (based on SCr of 0.9 mg/dL).    Physical Exam   Constitutional: He appears well-developed. No distress.   HENT:   Head: Atraumatic.   Mouth/Throat: Oropharynx is clear and moist. No oropharyngeal exudate.   Eyes: Conjunctivae and EOM are normal. Pupils are equal, round, and reactive to light. Scleral icterus is present.   Neck: Neck supple.   Cardiovascular: Normal rate and regular rhythm. Exam reveals no friction rub.   No murmur heard.  Pulmonary/Chest: Breath sounds normal. No respiratory distress. He has no wheezes. He has no rales. He exhibits no tenderness.   Abdominal: Soft. Bowel sounds are normal. He exhibits distension. There is tenderness. There is no rebound and no guarding.   Brownish SS fluid in JOSE A. [place] dressed.   Musculoskeletal: Normal range of motion. He exhibits edema.   Lymphadenopathy:     He has no cervical adenopathy.   Neurological: He is alert.   Skin: No rash noted. No erythema.       Significant Labs:   CBC:   Recent Labs   Lab 11/19/18  0734 11/19/18  1205 11/19/18  1602   WBC 20.20* 18.29* 21.88*   HGB 10.2* 9.1* 10.0*   HCT 30.3* 26.7* 29.9*   PLT 59* 52* 58*     CMP:   Recent Labs   Lab 11/19/18  0734 11/19/18  1205 11/19/18  1442 11/19/18  1602   * 135* 138 137   K 4.3 4.0 4.2 4.1   CL  106 105 106 105   CO2 24 27 27 28   * 213* 189* 182*   BUN 8 8 9 10   CREATININE 0.8 0.8 0.8 0.9   CALCIUM 7.3* 7.4* 7.5* 7.7*   PROT 3.6* 3.9*  --  4.0*   ALBUMIN 1.7* 2.5* 2.3* 2.3*   BILITOT 11.3* 12.5*  --  14.0*   ALKPHOS 198* 169*  --  198*   AST 36 28  --  30   ALT 74* 59*  --  63*   ANIONGAP 5* 3* 5* 4*   EGFRNONAA >60.0 >60.0 >60.0 >60.0       Significant Imaging: I have reviewed all pertinent imaging results/findings within the past 24 hours.

## 2018-11-19 NOTE — ASSESSMENT & PLAN NOTE
Plan:     1. For now keep on zyvox for previous VRE urinary infection, cipro and diflucan.   2.  Repeat WBC already coming down - leucocytosis likely due to recent surgeries and perhaps not an infectious process - stable today - await cultures from today's surgery     ID will follow with you and will ask Dr. Ireland for advice over long-term fungal/mold prophylaxis

## 2018-11-19 NOTE — SUBJECTIVE & OBJECTIVE
No acute events overnight, remained off pressors after returning from OR, abdomen closed at time of surgery. Vent settings minimal this morning.     Scheduled Meds:   albumin human 5%        ciprofloxacin (CIPRO)400mg/200ml D5W IVPB  400 mg Intravenous Q12H    famotidine (PF)  20 mg Intravenous Daily    heparin (porcine)  5,000 Units Subcutaneous Q8H    hydrocortisone sodium succinate  50 mg Intravenous Q8H    custom IVPB builder   Intravenous Q8H    Followed by    [START ON 11/20/2018] custom IVPB builder   Intravenous Q24H    k phos di & mono-sod phos mono  2 tablet Oral TID    linezolid 600mg/300ml  600 mg Intravenous Q12H    tacrolimus  1 mg Per G Tube BID    [START ON 11/21/2018] valganciclovir 50 mg/ml  450 mg Per NG tube Daily     Continuous Infusions:   sodium chloride 0.9% 200 mL/hr at 11/19/18 1000    insulin (HUMAN R) infusion (adults) 1.2 Units/hr (11/19/18 0900)    norepinephrine bitartrate-D5W Stopped (11/17/18 1100)    TPN ADULT CENTRAL LINE CUSTOM 60 mL/hr at 11/19/18 0900     PRN Meds:dextrose 50%, dextrose 50%, glucagon (human recombinant), glucose, glucose, HYDROmorphone, insulin aspart U-100, magnesium sulfate IVPB, potassium phosphate IVPB, ramelteon    Review of Systems   Unable to perform ROS: Intubated     Objective:     Vital Signs (Most Recent):  Temp: 98.1 °F (36.7 °C) (11/19/18 0701)  Pulse: 91 (11/19/18 1030)  Resp: 18 (11/19/18 1030)  BP: (!) 90/55 (11/19/18 1000)  SpO2: 100 % (11/19/18 1030) Vital Signs (24h Range):  Temp:  [97.9 °F (36.6 °C)-99.4 °F (37.4 °C)] 98.1 °F (36.7 °C)  Pulse:  [75-98] 91  Resp:  [15-30] 18  SpO2:  [98 %-100 %] 100 %  BP: ()/(50-79) 90/55  Arterial Line BP: ()/(44-69) 96/47     Weight: 98.1 kg (216 lb 4.3 oz)  Body mass index is 31.03 kg/m².    Intake/Output - Last 3 Shifts       11/17 0700 - 11/18 0659 11/18 0700 - 11/19 0659 11/19 0700 - 11/20 0659    I.V. (mL/kg) 8108.2 (82.7) 5815 (59.3)     Blood 1081 286     NG/GT 60 55       1473     Total Intake(mL/kg) 9710.2 (99) 7629 (77.8)     Urine (mL/kg/hr) 10 (0) 7 (0)     Drains 305 1460     Other 7434 6464 443    Blood       Total Output 7749 7931 443    Net +1961.2 -302 -443                 Physical Exam   Constitutional: He appears well-developed.   jaundiced   HENT:   Head: Normocephalic and atraumatic.   Eyes: Scleral icterus is present.   Cardiovascular: Normal rate, regular rhythm and intact distal pulses.   Pulmonary/Chest: Effort normal. No respiratory distress.   On vent   Vent Mode: A/C  Oxygen Concentration (%):  (40-99) 40  Resp Rate Total:  (16 br/min-37 br/min) 17 br/min  Vt Set:  (320 mL) 320 mL  PEEP/CPAP:  (5 cmH20) 5 cmH20  Mean Airway Pressure:  (7.8 cmH20-10 cmH20) 8.6 cmH20     Abdominal: Soft. He exhibits no distension.   Right sided JOSE A drain with dark serosanguinous output  Incision with clean bandage in place   Musculoskeletal: He exhibits edema.   Neurological:   Sedated on propofol   Skin: Skin is warm and dry.   Jaundice improving       Laboratory:  Immunosuppressants         Stop Route Frequency     tacrolimus (PROGRAF) 1 mg/mL oral syringe      -- PER G TUBE 2 times daily        Labs within the past 24 hours have been reviewed.    Diagnostic Results:  I have personally reviewed all pertinent imaging studies.

## 2018-11-19 NOTE — PT/OT/SLP DISCHARGE
Occupational Therapy Discharge Summary    Femi Enciso  MRN: [id number]   Principal Problem: S/P liver transplant      Patient Discharged from acute Occupational Therapy on 11/16/18.  Please refer to prior OT note dated 11/8/18 for functional status.    Assessment:      Patient has not met goals.    Objective:     GOALS:   Multidisciplinary Problems     Occupational Therapy Goals        Problem: Occupational Therapy Goal    Goal Priority Disciplines Outcome Interventions   Occupational Therapy Goal     OT, PT/OT Ongoing (interventions implemented as appropriate)    Description:  Goals to be met by: 11/23/18     Patient will increase functional independence with ADLs by performing:    UE Dressing with Supervision.  LE Dressing with Minimal Assistance. MET 11/8  REVISED to SBA,  Grooming while standing at sink with Stand-by Assistance. MET 11/8  Toileting from toilet with Stand-by Assistance for hygiene and clothing management.   Stand pivot transfers with Stand-by Assistance. MET 11/8  Toilet transfer to toilet with Stand-by Assistance.  Upper extremity  theraband exercise program x  15 reps  with independence.  Pt will perform a functional standing task x 8 min with AD as needed and SBA and monitor light headedness and need to sit for safety                       Reasons for Discontinuation of Therapy Services  Transfer to alternate level of care.      Plan:     Patient Discharged to: new orders needed 2nd to surgery for exp lap 11/16/18          ISACC Whitaker  11/19/2018

## 2018-11-19 NOTE — PROGRESS NOTES
CRRT:    Treatment ran for 8 hours until machine clotted off. Unable to return half of the blood lines.   CRRT restarted.   ACCESS: right subclavian permanent catheter, UF rate @350ml/hr  Daily checks done, orders verified.

## 2018-11-19 NOTE — PLAN OF CARE
"Problem: Patient Care Overview  Goal: Plan of Care Review  Dx: Liver transplant (11/11)  hx: ESLD, ETOH abuse; Severe depression.    11/12: liver transplant; extubated. Products given in OR and HD in OR. CRRT nocturnal.  11/13: CRRT nocturnal held; lines removed. FFP x1 given. Pulled out 2 JOSE A drains.   11/14: 3 PRBC's given for hgb 6; liver US shows potential hematoma. Trend CBC. Extreme agitation/attempted to "kill self" by holding breath for as long as possible multiple times. IV ativan/haldol given. Nocturnal CRRT restarted  Potential washout  To be determined.    11/16: OR for exlap and washout--1U PRBCs and 1 plts; sent back to OR--3 U PRBCs, 1FFP, 1cryo; abd open and wound vac placed  11/17: CT chest, abd, pelvis, levo off 2 units PRBCs, 2 units platelets, 1 unit FFP     Nursing:   -160  Labs Q4H  Plts >70    **Will go back to OR Sunday 11/18     Outcome: Ongoing (interventions implemented as appropriate)  Plan of care reviewed with pt and family. Pt went to Or for abd closure. Tolerated well. Vent settings AC 40% peep 6. TPN at 60cc/hr. Insulin gtt started, continue q4 accu check. Pt follows commands and moves all extremities. Propofol at 50mcg/kg/min. Plan to keep intubated overnight . Continue q4 cbc, cmp, INR. CRRT UF increased to 350. Pt tolerating well.      "

## 2018-11-19 NOTE — PROGRESS NOTES
[facility]  Liver Transplant  Progress Note    Patient Name: Femi Enciso  MRN: [id number]  Admission Date: 10/27/2018  Hospital Length of Stay: 23 days  Code Status: Full Code  Primary Care Provider: Primary Doctor No  Post-Op Day 1 Day Post-Op    ORGAN:   LIVER  Disease Etiology: Alcoholic Cirrhosis  Donor Type:    - Brain Death  CDC High Risk:   No  Donor CMV Status:   Donor CMV Status: Positive  Donor HBcAB:   Negative  Donor HCV Status:   Negative  Donor HBV JAVIER: Negative  Donor HCV JAVIER: Negative  Whole or Partial: Whole Liver  Biliary Anastomosis: End to End  Arterial Anatomy: Standard    Subjective:     No acute events overnight, remained off pressors after returning from OR, abdomen closed at time of surgery. Vent settings minimal this morning.     Scheduled Meds:   albumin human 5%        ciprofloxacin (CIPRO)400mg/200ml D5W IVPB  400 mg Intravenous Q12H    famotidine (PF)  20 mg Intravenous Daily    heparin (porcine)  5,000 Units Subcutaneous Q8H    hydrocortisone sodium succinate  50 mg Intravenous Q8H    custom IVPB builder   Intravenous Q8H    Followed by    [START ON 2018] custom IVPB builder   Intravenous Q24H    k phos di & mono-sod phos mono  2 tablet Oral TID    linezolid 600mg/300ml  600 mg Intravenous Q12H    tacrolimus  1 mg Per G Tube BID    [START ON 2018] valganciclovir 50 mg/ml  450 mg Per NG tube Daily     Continuous Infusions:   sodium chloride 0.9% 200 mL/hr at 18 1000    insulin (HUMAN R) infusion (adults) 1.2 Units/hr (18 0900)    norepinephrine bitartrate-D5W Stopped (18 1100)    TPN ADULT CENTRAL LINE CUSTOM 60 mL/hr at 18 0900     PRN Meds:dextrose 50%, dextrose 50%, glucagon (human recombinant), glucose, glucose, HYDROmorphone, insulin aspart U-100, magnesium sulfate IVPB, potassium phosphate IVPB, ramelteon    Review of Systems   Unable to perform ROS: Intubated     Objective:     Vital Signs (Most  Recent):  Temp: 98.1 °F (36.7 °C) (11/19/18 0701)  Pulse: 91 (11/19/18 1030)  Resp: 18 (11/19/18 1030)  BP: (!) 90/55 (11/19/18 1000)  SpO2: 100 % (11/19/18 1030) Vital Signs (24h Range):  Temp:  [97.9 °F (36.6 °C)-99.4 °F (37.4 °C)] 98.1 °F (36.7 °C)  Pulse:  [75-98] 91  Resp:  [15-30] 18  SpO2:  [98 %-100 %] 100 %  BP: ()/(50-79) 90/55  Arterial Line BP: ()/(44-69) 96/47     Weight: 98.1 kg (216 lb 4.3 oz)  Body mass index is 31.03 kg/m².    Intake/Output - Last 3 Shifts       11/17 0700 - 11/18 0659 11/18 0700 - 11/19 0659 11/19 0700 - 11/20 0659    I.V. (mL/kg) 8108.2 (82.7) 5815 (59.3)     Blood 1081 286     NG/GT 60 55      1473     Total Intake(mL/kg) 9710.2 (99) 7629 (77.8)     Urine (mL/kg/hr) 10 (0) 7 (0)     Drains 305 1460     Other 7434 6464 443    Blood       Total Output 7749 7931 443    Net +1961.2 -302 -443                 Physical Exam   Constitutional: He appears well-developed.   jaundiced   HENT:   Head: Normocephalic and atraumatic.   Eyes: Scleral icterus is present.   Cardiovascular: Normal rate, regular rhythm and intact distal pulses.   Pulmonary/Chest: Effort normal. No respiratory distress.   On vent   Vent Mode: A/C  Oxygen Concentration (%):  (40-99) 40  Resp Rate Total:  (16 br/min-37 br/min) 17 br/min  Vt Set:  (320 mL) 320 mL  PEEP/CPAP:  (5 cmH20) 5 cmH20  Mean Airway Pressure:  (7.8 cmH20-10 cmH20) 8.6 cmH20     Abdominal: Soft. He exhibits no distension.   Right sided JOSE A drain with dark serosanguinous output  Incision with clean bandage in place   Musculoskeletal: He exhibits edema.   Neurological:   Sedated on propofol   Skin: Skin is warm and dry.   Jaundice improving       Laboratory:  Immunosuppressants         Stop Route Frequency     tacrolimus (PROGRAF) 1 mg/mL oral syringe      -- PER G TUBE 2 times daily        Labs within the past 24 hours have been reviewed.    Diagnostic Results:  I have personally reviewed all pertinent imaging  studies.    Assessment/Plan:   Femi Enciso is a 53 y.o. male     GI   * S/P liver transplant      53 year old male with history of ESLD 2/2 ETOH cirrhosis who is s/p liver transplant. POD#4    Neuro  -acute change in mental status with confusion and agitation on 11/14, controlled with PRN ativan   -monitor neuro exam when sedation weaned, currently sedated with propofol - alert and oriented, responds appropriately off sedation    CV  -remained off pressors for last 24hrs, continue to monitor and leave off as tolerated  -swan tricia catheter removed  -monitor on telemetry     Resp  -left intubated following OR 11/16 given open abdomen with planned take-back, minimal vent settings following take-back, wean to extubate today  -Vent Mode: Spont  Oxygen Concentration (%):  [4-100] 41  Resp Rate Total:  [16 br/min-40 br/min] 28 br/min  Vt Set:  [320 mL] 320 mL  PEEP/CPAP:  [5 cmH20-6 cmH20] 5 cmH20  Pressure Support:  [5 cmH20] 5 cmH20  Mean Airway Pressure:  [7.1 qdO80-88 cmH20] 7.1 cmH20  -ABGs prn  -daily CXR while intubated   -continue to monitor O2 sats   -CXR stable      Heme  -H/H stable overnight,   -INR 1.3, continue to monitor   -q4 CBCs, transfuse products as necessary     ID  -monitor fever and WBC   -f/u cultures   -Abx: Cipro, fluconazole and linezolid     MSK  -critical nature of patient prohibits OOBTC or ambulation     FEN/GI  -replace lytes   -Continue NPO for now  -s/p ex-lap 11/16 requiring take-back for bleeding, left open, now s/p closure 11/18  -Continue TPN     Endocrine  -insulin as needed. Monitor BG       -CRRT per renal, would like to increase UF of CRRT today     dispo  -continue ICU care, wean to extubate this morning            The patients clinical status was discussed at multidisplinary rounds, involving transplant surgery, transplant medicine, pharmacy, nursing, nutrition, and social work.    Crys Flores MD  Liver Transplant  [facility]

## 2018-11-19 NOTE — SUBJECTIVE & OBJECTIVE
Interval History:   [facility] Improved hemodynamically off pressors overnight. OR. CVP 6 remains anuric. Fluid balance 2.3 lts gain. Hb stable 10.3 g    Review of patient's allergies indicates:   Allergen Reactions    Penicillins Nausea And Vomiting and Rash     Current Facility-Administered Medications   Medication Frequency    0.9%  NaCl infusion (CRRT USE ONLY) Continuous    0.9%  NaCl infusion (for blood administration) Q24H PRN    0.9%  NaCl infusion (for blood administration) Q24H PRN    0.9%  NaCl infusion (for blood administration) Q24H PRN    0.9%  NaCl infusion (for blood administration) Q24H PRN    0.9%  NaCl infusion (for blood administration) Q24H PRN    0.9%  NaCl infusion (for blood administration) Q24H PRN    ciprofloxacin (CIPRO)400mg/200ml D5W IVPB 400 mg Q12H    dextrose 50% injection 12.5 g PRN    dextrose 50% injection 25 g PRN    famotidine (PF) injection 20 mg Daily    fentaNYL injection 25 mcg Q1H PRN    glucagon (human recombinant) injection 1 mg PRN    glucose chewable tablet 16 g PRN    glucose chewable tablet 24 g PRN    heparin (porcine) injection 5,000 Units Q8H    hydrocortisone sodium succinate injection 50 mg Q8H    insulin aspart U-100 pen 0-10 Units PRN    insulin regular (Humulin R) 100 Units in sodium chloride 0.9% 100 mL infusion Continuous    isavuconazonium sulfate 372 mg in sodium chloride 0.9% 250 mL Q8H    Followed by    [START ON 11/20/2018] isavuconazonium sulfate 372 mg in sodium chloride 0.9% 250 mL Q24H    k phos di & mono-sod phos mono 250 mg tablet 2 tablet TID    linezolid 600mg/300ml 600 mg/300 mL IVPB 600 mg Q12H    magnesium sulfate 2g in water 50mL IVPB (premix) PRN    norepinephrine 4 mg in dextrose 5% 250 mL infusion (premix) (titrating) Continuous    potassium phosphate 15 mmol in dextrose 5 % 250 mL infusion BID PRN    propofol (DIPRIVAN) 10 mg/mL infusion Continuous    ramelteon tablet 8 mg Nightly PRN    TPN  ADULT CENTRAL LINE CUSTOM Continuous    [START ON 11/21/2018] valganciclovir 50 mg/ml oral solution 450 mg Daily       Objective:     Vital Signs (Most Recent):  Temp: 98.8 °F (37.1 °C) (11/18/18 1900)  Pulse: 86 (11/18/18 2108)  Resp: (!) 25 (11/18/18 0800)  BP: 107/70 (11/18/18 1800)  SpO2: 100 % (11/18/18 2108)  O2 Device (Oxygen Therapy): ventilator (11/18/18 2108) Vital Signs (24h Range):  Temp:  [97.6 °F (36.4 °C)-98.8 °F (37.1 °C)] 98.8 °F (37.1 °C)  Pulse:  [67-96] 86  Resp:  [16-33] 25  SpO2:  [97 %-100 %] 100 %  BP: (100-145)/(59-80) 107/70  Arterial Line BP: ()/(47-82) 136/69     Weight: 98.1 kg (216 lb 4.3 oz) (11/15/18 0500)  Body mass index is 31.03 kg/m².  Body surface area is 2.2 meters squared.    I/O last 3 completed shifts:  In: 49750.2 [I.V.:88483.2; Blood:1367; NG/GT:80]  Out: 61686 [Urine:17; Drains:855; Other:94944]    Physical Exam   Constitutional: He appears well-developed. He appears cachectic. He is cooperative. No distress. He is sedated and intubated.   Pleasantly disoriented but improved from yesterday.    HENT:   Head: Normocephalic and atraumatic.   Eyes: Conjunctivae are normal. Pupils are equal, round, and reactive to light.   Neck: Trachea normal. Neck supple. No JVD present.   Cardiovascular: Normal rate, regular rhythm, S1 normal, S2 normal and normal pulses. Exam reveals no gallop and no friction rub.   No murmur heard.  Pulmonary/Chest: Effort normal. He is intubated. He has decreased breath sounds in the right lower field and the left lower field. He has no rhonchi.   Abdominal: Soft. He exhibits distension. He exhibits no ascites. Bowel sounds are decreased. There is no tenderness.       Musculoskeletal: Normal range of motion. He exhibits edema (edema+ trace pitting sacral).   Neurological:   Pleasantly disoriented no focal deficit.    Skin: Skin is warm and dry. Capillary refill takes less than 2 seconds.   Psychiatric: He has a normal mood and affect. His behavior  is normal.   Vitals reviewed.      Significant Labs:  ABGs:   Recent Labs   Lab 11/18/18  1615   PH 7.366   PCO2 45.7*   HCO3 26.1   POCSATURATED 93*   BE 1     BMP:   Recent Labs   Lab 11/18/18  1335  11/18/18 1945   *   < > 245*      < > 106   CO2 23   < > 25   BUN 10   < > 12   CREATININE 0.9   < > 0.9   CALCIUM 7.5*   < > 7.7*   MG 1.9  --   --     < > = values in this interval not displayed.     Cardiac Markers: No results for input(s): CKMB, TROPONINT, MYOGLOBIN in the last 168 hours.  CBC:   Recent Labs   Lab 11/18/18 1945   WBC 12.11   RBC 3.43*   HGB 10.3*   HCT 31.1*   PLT 63*   MCV 91   MCH 30.0   MCHC 33.1     CMP:   Recent Labs   Lab 11/18/18 1945   *   CALCIUM 7.7*   ALBUMIN 2.1*   PROT 4.0*      K 4.0   CO2 25      BUN 12   CREATININE 0.9   ALKPHOS 165*   ALT 85*   AST 42*   BILITOT 10.7*     Coagulation:   Recent Labs   Lab 11/18/18  0352  11/18/18 1945   INR  --    < > 1.2   APTT 31.3  --   --     < > = values in this interval not displayed.     No results for input(s): COLORU, CLARITYU, SPECGRAV, PHUR, PROTEINUA, GLUCOSEU, BILIRUBINCON, BLOODU, WBCU, RBCU, BACTERIA, MUCUS, NITRITE, LEUKOCYTESUR, UROBILINOGEN, HYALINECASTS in the last 168 hours.  All labs within the past 24 hours have been reviewed.     Significant Imaging:  Labs: Reviewed  US: Reviewed

## 2018-11-19 NOTE — NURSING
Pt returned to room 21698 per anesthesia. All vitals stable. Dr. Wylie aware of pts arrival. Will continue to monitor

## 2018-11-19 NOTE — ASSESSMENT & PLAN NOTE
Nutrition Problem  Malnutrition in the context of Chronic Illness/Injury    Related to (etiology):  Cirrhosis and poor appetite    Signs and Symptoms (as evidenced by):  Energy Intake: <75% of estimated energy requirement for 2 months  Body Fat Depletion: moderate depletion of orbitals and triceps   Muscle Mass Depletion: moderate depletion of temples, clavicle region and scapular region   Weight Loss: 14% x 2 months (per patient, unable to verify per chart review)    Interventions/Recommendations (treatment strategy):  Full assessment completed, see RD Note 11/19/2018.    Nutrition Diagnosis Status:  Continues

## 2018-11-19 NOTE — PROGRESS NOTES
VITALIY met with pt and pts wife at bedside today for continuity of care.   Pt remains on ventilator at this time, but is awake, alert and nodding head to SW this am.  LTS rounding team plans to extubate later today.   Pt taken back to OR several times this weekend for ex-lap.  Pt on CRRT at bedside.  Pts wife denies coping issues, she and pts mother staying locally at [facility] right now, as patient's insurance has lodging reimbursement.    Pts wife did turn in transplant lodging financial profile, pt will have a fee of $16.66 a day at transplant apartments.   Maggie Zhu notified of pts fee today.    Pts wife denies any psychosocial needs at this time or any coping issues.   VITALIY remains available for all transplant resources, education and psychosocial support.

## 2018-11-19 NOTE — SUBJECTIVE & OBJECTIVE
"Interval HPI:   Overnight events:  Remains in ICU, intubated and sedated.  NAEON. BG slightly above goal on insulin infusion at 1.2 u/hr. Hydrocortisone 50 mg every 8 hours.   Eating:   NPO  Nausea: No  Hypoglycemia and intervention: No  Fever: No  TPN: Yes at 60 cc/hr     BP (!) 90/55   Pulse 88   Temp 98.1 °F (36.7 °C) (Oral)   Resp (!) 22   Ht 5' 10" (1.778 m)   Wt 98.1 kg (216 lb 4.3 oz)   SpO2 100%   BMI 31.03 kg/m²     Labs Reviewed and Include    Recent Labs   Lab 11/19/18  0734   *   CALCIUM 7.3*   ALBUMIN 1.7*   PROT 3.6*   *   K 4.3   CO2 24      BUN 8   CREATININE 0.8   ALKPHOS 198*   ALT 74*   AST 36   BILITOT 11.3*     Lab Results   Component Value Date    WBC 20.20 (H) 11/19/2018    HGB 10.2 (L) 11/19/2018    HCT 30.3 (L) 11/19/2018    MCV 88 11/19/2018    PLT 59 (L) 11/19/2018     No results for input(s): TSH, FREET4 in the last 168 hours.  Lab Results   Component Value Date    HGBA1C 4.4 11/09/2018       Nutritional status:   Body mass index is 31.03 kg/m².  Lab Results   Component Value Date    ALBUMIN 1.7 (L) 11/19/2018    ALBUMIN 1.8 (L) 11/19/2018    ALBUMIN 1.8 (L) 11/19/2018     Lab Results   Component Value Date    PREALBUMIN 7 (L) 10/27/2018       Estimated Creatinine Clearance: 125.4 mL/min (based on SCr of 0.8 mg/dL).    Accu-Checks  Recent Labs     11/17/18  1754 11/17/18  2359 11/18/18  0602 11/18/18  0815 11/18/18  1210 11/18/18  1618 11/18/18  2021 11/19/18  0001 11/19/18  0528 11/19/18  0743   POCTGLUCOSE 189* 255* 235* 241* 256* 305* 211* 210* 210* 208*       Current Medications and/or Treatments Impacting Glycemic Control  Immunotherapy:    Immunosuppressants     None        Steroids:   Hormones (From admission, onward)    Start     Stop Route Frequency Ordered    11/17/18 1400  hydrocortisone sodium succinate injection 50 mg      -- IV Every 8 hours 11/17/18 0859    11/09/18 1345  methylPREDNISolone sodium succinate injection 500 mg  (Med - " Immunosuppression Induction Therapy (Methylprednisolone))      11/10 0144 IV Once pre-op 11/09/18 1236        Pressors:    Autonomic Drugs (From admission, onward)    Start     Stop Route Frequency Ordered    11/16/18 1615  norepinephrine 4 mg in dextrose 5% 250 mL infusion (premix) (titrating)     Question Answer Comment   Titrate by: (in mcg/kg/min) 5    Titrate interval: (in minutes) 15    Titrate to maintain: (MAP or SBP) SBP    Greater than: (in mmHg) 100    Maximum dose: (in mcg/kg/min) 15        -- IV Continuous 11/16/18 1509        Hyperglycemia/Diabetes Medications:   Antihyperglycemics (From admission, onward)    Start     Stop Route Frequency Ordered    11/18/18 0830  insulin regular (Humulin R) 100 Units in sodium chloride 0.9% 100 mL infusion      -- IV Continuous 11/18/18 0726    11/18/18 0826  insulin aspart U-100 pen 0-10 Units      -- SubQ As needed (PRN) 11/18/18 0726

## 2018-11-19 NOTE — PROGRESS NOTES
[facility]  Infectious Disease  Progress Note    Patient Name: Femi Enciso  MRN: [id number]  Admission Date: 10/27/2018  Length of Stay: 23 days  Attending Physician: Femi Patel MD  Primary Care Provider: Primary Doctor No    Isolation Status: Contact  Assessment/Plan:      Delirium    54yo man w/a history of DM2 and alcohol dependence with associated hepatitis who was admitted on 10/27/2018 with acute liver failure (c/b PSE, HRS, hepatic hydrothorax) and ultimately underwent liver transplantation (s/p DBDLT 11/11/2018, CMV D+/R-, steroid induction, on maintenance tacro/MMF/pred; c/b post-operative delirium, VRE bacteruria, and IA hemorrhage requiring re-do ex-lap, RP/retrorenal/retrocolic hematoma evacuation, cauterization of RP bleed, partial right adrenalectomy, and temporary vac abdominal closure on 11/16/2018 and planned benign second look washout on 11/18/2018). Patient has stabilized since bleed cauterization with GPC on operative gram stains, implying his hematoma/bleed was infected.    - would continue empiric linezolid (h/o VRE bacteruria and GPC on gram stain from OR) and ciprofloxacin (PCN/ceph allergy)  - would transition antifungals to posaconazole for mold prophylaxis per institutional protocol  - remainder of prophylaxis per protocol  - will f/u pending operative cultures to tailor therapy         Anticipated Disposition: pending improvement    Thank you for your consult. I will follow-up with patient. Please contact us if you have any additional questions.     Rosalee Ireland MD  Transplant ID Attending  [phone]    Rosalee Ireland MD  Infectious Disease  [facility]    Subjective:     Principal Problem:S/P liver transplant    HPI: No notes on file  Interval History: Improved with regards to AMS today per family. Afebrile. GPC on washout stains.    Review of Systems   Unable to perform ROS: Mental status change     Objective:     Vital Signs (Most  Recent):  Temp: 98.6 °F (37 °C) (11/19/18 1100)  Pulse: 100 (11/19/18 1715)  Resp: (!) 21 (11/19/18 1715)  BP: 104/62 (11/19/18 1700)  SpO2: 100 % (11/19/18 1715) Vital Signs (24h Range):  Temp:  [98.1 °F (36.7 °C)-99.4 °F (37.4 °C)] 98.6 °F (37 °C)  Pulse:  [] 100  Resp:  [15-30] 21  SpO2:  [100 %] 100 %  BP: ()/(50-72) 104/62  Arterial Line BP: ()/(44-69) 129/58     Weight: 98.1 kg (216 lb 4.3 oz)  Body mass index is 31.03 kg/m².    Estimated Creatinine Clearance: 111.4 mL/min (based on SCr of 0.9 mg/dL).    Physical Exam   Constitutional: He appears well-developed. No distress.   HENT:   Head: Atraumatic.   Mouth/Throat: Oropharynx is clear and moist. No oropharyngeal exudate.   Eyes: Conjunctivae and EOM are normal. Pupils are equal, round, and reactive to light. Scleral icterus is present.   Neck: Neck supple.   Cardiovascular: Normal rate and regular rhythm. Exam reveals no friction rub.   No murmur heard.  Pulmonary/Chest: Breath sounds normal. No respiratory distress. He has no wheezes. He has no rales. He exhibits no tenderness.   Abdominal: Soft. Bowel sounds are normal. He exhibits distension. There is tenderness. There is no rebound and no guarding.   Brownish SS fluid in [employer] dressed.   Musculoskeletal: Normal range of motion. He exhibits edema.   Lymphadenopathy:     He has no cervical adenopathy.   Neurological: He is alert.   Skin: No rash noted. No erythema.       Significant Labs:   CBC:   Recent Labs   Lab 11/19/18  0734 11/19/18  1205 11/19/18  1602   WBC 20.20* 18.29* 21.88*   HGB 10.2* 9.1* 10.0*   HCT 30.3* 26.7* 29.9*   PLT 59* 52* 58*     CMP:   Recent Labs   Lab 11/19/18  0734 11/19/18  1205 11/19/18  1442 11/19/18  1602   * 135* 138 137   K 4.3 4.0 4.2 4.1    105 106 105   CO2 24 27 27 28   * 213* 189* 182*   BUN 8 8 9 10   CREATININE 0.8 0.8 0.8 0.9   CALCIUM 7.3* 7.4* 7.5* 7.7*   PROT 3.6* 3.9*  --  4.0*   ALBUMIN 1.7* 2.5* 2.3* 2.3*   BILITOT  11.3* 12.5*  --  14.0*   ALKPHOS 198* 169*  --  198*   AST 36 28  --  30   ALT 74* 59*  --  63*   ANIONGAP 5* 3* 5* 4*   EGFRNONAA >60.0 >60.0 >60.0 >60.0       Significant Imaging: I have reviewed all pertinent imaging results/findings within the past 24 hours.

## 2018-11-19 NOTE — ASSESSMENT & PLAN NOTE
54yo man w/a history of DM2 and alcohol dependence with associated hepatitis who was admitted on 10/27/2018 with acute liver failure (c/b PSE, HRS, hepatic hydrothorax) and ultimately underwent liver transplantation (s/p DBDLT 11/11/2018, CMV D+/R-, steroid induction, on maintenance tacro/MMF/pred; c/b post-operative delirium, VRE bacteruria, and IA hemorrhage requiring re-do ex-lap, RP/retrorenal/retrocolic hematoma evacuation, cauterization of RP bleed, partial right adrenalectomy, and temporary vac abdominal closure on 11/16/2018 and planned benign second look washout on 11/18/2018). Patient has stabilized since bleed cauterization with GPC on operative gram stains, implying his hematoma/bleed was infected.    - would continue empiric linezolid (h/o VRE bacteruria and GPC on gram stain from OR) and ciprofloxacin (PCN/ceph allergy)  - would transition antifungals to posaconazole for mold prophylaxis per institutional protocol  - remainder of prophylaxis per protocol  - will f/u pending operative cultures to tailor therapy

## 2018-11-19 NOTE — SUBJECTIVE & OBJECTIVE
Interval History: Patient in the OR - chart reviewed only     Review of Systems  Objective:     Vital Signs (Most Recent):  Temp: 99.4 °F (37.4 °C) (11/18/18 2300)  Pulse: 89 (11/18/18 2300)  Resp: (!) 25 (11/18/18 0800)  BP: (!) 87/58 (11/18/18 2300)  SpO2: 100 % (11/18/18 2300) Vital Signs (24h Range):  Temp:  [97.9 °F (36.6 °C)-99.4 °F (37.4 °C)] 99.4 °F (37.4 °C)  Pulse:  [69-96] 89  Resp:  [16-33] 25  SpO2:  [97 %-100 %] 100 %  BP: ()/(56-79) 87/58  Arterial Line BP: ()/(47-82) 113/55     Weight: 98.1 kg (216 lb 4.3 oz)  Body mass index is 31.03 kg/m².    Estimated Creatinine Clearance: 125.4 mL/min (based on SCr of 0.8 mg/dL).    Physical Exam   No exam done - in the OR     Significant Labs: All pertinent labs within the past 24 hours have been reviewed.    Significant Imaging: I have reviewed all pertinent imaging results/findings within the past 24 hours.

## 2018-11-19 NOTE — SUBJECTIVE & OBJECTIVE
Interval History: on SLED doing ok, but has required a few boluses of albumin for borderline BP, in the 120s when seen    Review of patient's allergies indicates:   Allergen Reactions    Cefepime Rash    Penicillins Nausea And Vomiting and Rash     Full body rash from cefepime as well     Current Facility-Administered Medications   Medication Frequency    [START ON 11/20/2018] ciprofloxacin HCl tablet 750 mg Daily    dextrose 50% injection 12.5 g PRN    dextrose 50% injection 25 g PRN    famotidine tablet 20 mg QHS    fat emulsion 20 % infusion 250 mL Daily    glucagon (human recombinant) injection 1 mg PRN    glucose chewable tablet 16 g PRN    glucose chewable tablet 24 g PRN    heparin (porcine) injection 5,000 Units Q8H    hydrocortisone sodium succinate injection 50 mg Q8H    HYDROmorphone injection 0.2 mg Q3H PRN    insulin aspart U-100 pen 0-10 Units PRN    insulin regular (Humulin R) 100 Units in sodium chloride 0.9% 100 mL infusion Continuous    isavuconazonium sulfate Cap 372 mg Q8H    Followed by    [START ON 11/20/2018] isavuconazonium sulfate Cap 372 mg Daily    k phos di & mono-sod phos mono 250 mg tablet 2 tablet TID    linezolid tablet 600 mg Q12H    oxyCODONE immediate release tablet 5 mg Q4H PRN    oxyCODONE immediate release tablet Tab 10 mg Q4H PRN    potassium phosphate 15 mmol in dextrose 5 % 250 mL infusion BID PRN    ramelteon tablet 8 mg Nightly PRN    tacrolimus capsule 1 mg BID    TPN ADULT CENTRAL LINE CUSTOM Continuous    TPN ADULT CENTRAL LINE CUSTOM Continuous    [START ON 11/21/2018] valGANciclovir tablet 450 mg Every Mon, Wed, Fri       Objective:     Vital Signs (Most Recent):  Temp: 98.6 °F (37 °C) (11/19/18 1100)  Pulse: 96 (11/19/18 1530)  Resp: (!) 29 (11/19/18 1530)  BP: 101/64 (11/19/18 1500)  SpO2: 100 % (11/19/18 1530)  O2 Device (Oxygen Therapy): nasal cannula (11/19/18 1300) Vital Signs (24h Range):  Temp:  [98.1 °F (36.7 °C)-99.4 °F (37.4 °C)]  98.6 °F (37 °C)  Pulse:  [83-98] 96  Resp:  [15-30] 29  SpO2:  [99 %-100 %] 100 %  BP: ()/(50-79) 101/64  Arterial Line BP: ()/(44-69) 147/64     Weight: 98.1 kg (216 lb 4.3 oz) (11/15/18 0500)  Body mass index is 31.03 kg/m².  Body surface area is 2.2 meters squared.    I/O last 3 completed shifts:  In: 9422 [I.V.:6897; Blood:536; NG/GT:55]  Out: 21945 [Urine:7; Drains:1700; Other:79261]    Physical Exam   HENT:   Head: Atraumatic.   Eyes: EOM are normal.   Neck: No JVD present.   Cardiovascular: Normal rate and regular rhythm. Exam reveals no friction rub.   Pulmonary/Chest: Effort normal and breath sounds normal.   Abdominal: Soft. He exhibits distension.   Musculoskeletal: He exhibits edema.   Neurological: He is alert.   Skin: Skin is warm.

## 2018-11-19 NOTE — ASSESSMENT & PLAN NOTE
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   - s/p OHLTx 11/11/2018; intra-op SLED  - remaining anuric, but volume status and clearance number seem stable/ok        Plan:  - continue SLED, but lower UF to 200-250 and if hemodynamics become an issue would rinse back and give a break for the night

## 2018-11-20 PROBLEM — E68 SEQUELAE OF HYPERALIMENTATION: Status: ACTIVE | Noted: 2018-11-20

## 2018-11-20 LAB
ALBUMIN SERPL BCP-MCNC: 1.8 G/DL
ALBUMIN SERPL BCP-MCNC: 1.8 G/DL
ALBUMIN SERPL BCP-MCNC: 1.9 G/DL
ALBUMIN SERPL BCP-MCNC: 1.9 G/DL
ALBUMIN SERPL BCP-MCNC: 2 G/DL
ALBUMIN SERPL BCP-MCNC: 2.1 G/DL
ALBUMIN SERPL BCP-MCNC: 2.1 G/DL
ALP SERPL-CCNC: 199 U/L
ALP SERPL-CCNC: 203 U/L
ALP SERPL-CCNC: 210 U/L
ALT SERPL W/O P-5'-P-CCNC: 56 U/L
ALT SERPL W/O P-5'-P-CCNC: 56 U/L
ALT SERPL W/O P-5'-P-CCNC: 60 U/L
ANION GAP SERPL CALC-SCNC: 5 MMOL/L
ANION GAP SERPL CALC-SCNC: 5 MMOL/L
ANION GAP SERPL CALC-SCNC: 6 MMOL/L
ANION GAP SERPL CALC-SCNC: 6 MMOL/L
ANION GAP SERPL CALC-SCNC: 7 MMOL/L
ANION GAP SERPL CALC-SCNC: 7 MMOL/L
ANION GAP SERPL CALC-SCNC: 8 MMOL/L
ANISOCYTOSIS BLD QL SMEAR: SLIGHT
APTT BLDCRRT: 35.7 SEC
AST SERPL-CCNC: 27 U/L
AST SERPL-CCNC: 29 U/L
AST SERPL-CCNC: 32 U/L
BACTERIA SPEC AEROBE CULT: NORMAL
BACTERIA SPEC AEROBE CULT: NORMAL
BASO STIPL BLD QL SMEAR: ABNORMAL
BASO STIPL BLD QL SMEAR: ABNORMAL
BASOPHILS # BLD AUTO: ABNORMAL K/UL
BASOPHILS NFR BLD: 0 %
BASOPHILS NFR BLD: 0 %
BASOPHILS NFR BLD: 1 %
BILIRUB DIRECT SERPL-MCNC: 10.1 MG/DL
BILIRUB SERPL-MCNC: 13.6 MG/DL
BILIRUB SERPL-MCNC: 13.6 MG/DL
BILIRUB SERPL-MCNC: 13.9 MG/DL
BUN SERPL-MCNC: 12 MG/DL
BUN SERPL-MCNC: 14 MG/DL
BUN SERPL-MCNC: 14 MG/DL
BUN SERPL-MCNC: 19 MG/DL
BURR CELLS BLD QL SMEAR: ABNORMAL
BURR CELLS BLD QL SMEAR: ABNORMAL
C DIFF GDH STL QL: NEGATIVE
C DIFF TOX A+B STL QL IA: NEGATIVE
CALCIUM SERPL-MCNC: 7.2 MG/DL
CALCIUM SERPL-MCNC: 7.4 MG/DL
CALCIUM SERPL-MCNC: 7.5 MG/DL
CHLORIDE SERPL-SCNC: 105 MMOL/L
CHLORIDE SERPL-SCNC: 106 MMOL/L
CHLORIDE SERPL-SCNC: 106 MMOL/L
CHLORIDE SERPL-SCNC: 107 MMOL/L
CO2 SERPL-SCNC: 23 MMOL/L
CO2 SERPL-SCNC: 24 MMOL/L
CO2 SERPL-SCNC: 24 MMOL/L
CO2 SERPL-SCNC: 27 MMOL/L
CREAT SERPL-MCNC: 0.8 MG/DL
CREAT SERPL-MCNC: 0.9 MG/DL
CREAT SERPL-MCNC: 0.9 MG/DL
DIFFERENTIAL METHOD: ABNORMAL
EOSINOPHIL # BLD AUTO: ABNORMAL K/UL
EOSINOPHIL NFR BLD: 10 %
EOSINOPHIL NFR BLD: 11 %
EOSINOPHIL NFR BLD: 17 %
ERYTHROCYTE [DISTWIDTH] IN BLOOD BY AUTOMATED COUNT: 17.2 %
ERYTHROCYTE [DISTWIDTH] IN BLOOD BY AUTOMATED COUNT: 17.2 %
ERYTHROCYTE [DISTWIDTH] IN BLOOD BY AUTOMATED COUNT: 17.6 %
EST. GFR  (AFRICAN AMERICAN): >60 ML/MIN/1.73 M^2
EST. GFR  (NON AFRICAN AMERICAN): >60 ML/MIN/1.73 M^2
GGT SERPL-CCNC: 331 U/L
GIANT PLATELETS BLD QL SMEAR: PRESENT
GIANT PLATELETS BLD QL SMEAR: PRESENT
GLUCOSE SERPL-MCNC: 163 MG/DL
GLUCOSE SERPL-MCNC: 173 MG/DL
GLUCOSE SERPL-MCNC: 173 MG/DL
GLUCOSE SERPL-MCNC: 190 MG/DL
GLUCOSE SERPL-MCNC: 265 MG/DL
HCT VFR BLD AUTO: 28.7 %
HCT VFR BLD AUTO: 29.9 %
HCT VFR BLD AUTO: 30.1 %
HGB BLD-MCNC: 10.1 G/DL
HGB BLD-MCNC: 10.1 G/DL
HGB BLD-MCNC: 9.6 G/DL
HYPOCHROMIA BLD QL SMEAR: ABNORMAL
IMM GRANULOCYTES # BLD AUTO: ABNORMAL K/UL
IMM GRANULOCYTES NFR BLD AUTO: ABNORMAL %
INR PPP: 1.4
INR PPP: 1.4
INR PPP: 1.5
LYMPHOCYTES # BLD AUTO: ABNORMAL K/UL
LYMPHOCYTES NFR BLD: 0 %
LYMPHOCYTES NFR BLD: 0 %
LYMPHOCYTES NFR BLD: 2 %
MAGNESIUM SERPL-MCNC: 1.9 MG/DL
MAGNESIUM SERPL-MCNC: 2 MG/DL
MAGNESIUM SERPL-MCNC: 2.2 MG/DL
MCH RBC QN AUTO: 29.6 PG
MCH RBC QN AUTO: 29.8 PG
MCH RBC QN AUTO: 29.9 PG
MCHC RBC AUTO-ENTMCNC: 33.4 G/DL
MCHC RBC AUTO-ENTMCNC: 33.6 G/DL
MCHC RBC AUTO-ENTMCNC: 33.8 G/DL
MCV RBC AUTO: 89 FL
METAMYELOCYTES NFR BLD MANUAL: 1 %
MONOCYTES # BLD AUTO: ABNORMAL K/UL
MONOCYTES NFR BLD: 2 %
MONOCYTES NFR BLD: 4 %
MONOCYTES NFR BLD: 5 %
MYELOCYTES NFR BLD MANUAL: 2 %
NEUTROPHILS NFR BLD: 75 %
NEUTROPHILS NFR BLD: 82 %
NEUTROPHILS NFR BLD: 83 %
NEUTS BAND NFR BLD MANUAL: 2 %
NEUTS BAND NFR BLD MANUAL: 3 %
NRBC BLD-RTO: 0 /100 WBC
OVALOCYTES BLD QL SMEAR: ABNORMAL
PHOSPHATE SERPL-MCNC: 2.2 MG/DL
PHOSPHATE SERPL-MCNC: 2.2 MG/DL
PHOSPHATE SERPL-MCNC: 2.7 MG/DL
PHOSPHATE SERPL-MCNC: 2.7 MG/DL
PHOSPHATE SERPL-MCNC: 3.4 MG/DL
PLATELET # BLD AUTO: 69 K/UL
PLATELET # BLD AUTO: 70 K/UL
PLATELET # BLD AUTO: 70 K/UL
PLATELET BLD QL SMEAR: ABNORMAL
PMV BLD AUTO: 12.3 FL
PMV BLD AUTO: 13.3 FL
PMV BLD AUTO: 13.4 FL
POCT GLUCOSE: 168 MG/DL (ref 70–110)
POCT GLUCOSE: 168 MG/DL (ref 70–110)
POCT GLUCOSE: 219 MG/DL (ref 70–110)
POCT GLUCOSE: 223 MG/DL (ref 70–110)
POCT GLUCOSE: 269 MG/DL (ref 70–110)
POIKILOCYTOSIS BLD QL SMEAR: SLIGHT
POLYCHROMASIA BLD QL SMEAR: ABNORMAL
POTASSIUM SERPL-SCNC: 4.1 MMOL/L
POTASSIUM SERPL-SCNC: 4.1 MMOL/L
POTASSIUM SERPL-SCNC: 4.2 MMOL/L
POTASSIUM SERPL-SCNC: 4.4 MMOL/L
POTASSIUM SERPL-SCNC: 4.4 MMOL/L
PROT SERPL-MCNC: 3.6 G/DL
PROT SERPL-MCNC: 3.7 G/DL
PROT SERPL-MCNC: 3.8 G/DL
PROTHROMBIN TIME: 13.7 SEC
PROTHROMBIN TIME: 14.3 SEC
PROTHROMBIN TIME: 15.2 SEC
RBC # BLD AUTO: 3.24 M/UL
RBC # BLD AUTO: 3.38 M/UL
RBC # BLD AUTO: 3.39 M/UL
SCHISTOCYTES BLD QL SMEAR: PRESENT
SCHISTOCYTES BLD QL SMEAR: PRESENT
SODIUM SERPL-SCNC: 136 MMOL/L
SODIUM SERPL-SCNC: 138 MMOL/L
SODIUM SERPL-SCNC: 140 MMOL/L
SODIUM SERPL-SCNC: 140 MMOL/L
TACROLIMUS BLD-MCNC: 4.4 NG/ML
WBC # BLD AUTO: 28.72 K/UL
WBC # BLD AUTO: 29.11 K/UL
WBC # BLD AUTO: 32.33 K/UL

## 2018-11-20 PROCEDURE — 85610 PROTHROMBIN TIME: CPT | Mod: 91

## 2018-11-20 PROCEDURE — 82248 BILIRUBIN DIRECT: CPT | Mod: 91

## 2018-11-20 PROCEDURE — 63600175 PHARM REV CODE 636 W HCPCS: Mod: JG | Performed by: STUDENT IN AN ORGANIZED HEALTH CARE EDUCATION/TRAINING PROGRAM

## 2018-11-20 PROCEDURE — 99232 PR SUBSEQUENT HOSPITAL CARE,LEVL II: ICD-10-PCS | Mod: ,,, | Performed by: NURSE PRACTITIONER

## 2018-11-20 PROCEDURE — P9045 ALBUMIN (HUMAN), 5%, 250 ML: HCPCS | Mod: JG | Performed by: STUDENT IN AN ORGANIZED HEALTH CARE EDUCATION/TRAINING PROGRAM

## 2018-11-20 PROCEDURE — 82248 BILIRUBIN DIRECT: CPT

## 2018-11-20 PROCEDURE — 82977 ASSAY OF GGT: CPT | Mod: 91

## 2018-11-20 PROCEDURE — 84100 ASSAY OF PHOSPHORUS: CPT

## 2018-11-20 PROCEDURE — 80053 COMPREHEN METABOLIC PANEL: CPT | Mod: 91

## 2018-11-20 PROCEDURE — 63600175 PHARM REV CODE 636 W HCPCS: Performed by: SURGERY

## 2018-11-20 PROCEDURE — 80197 ASSAY OF TACROLIMUS: CPT | Mod: 91

## 2018-11-20 PROCEDURE — 80197 ASSAY OF TACROLIMUS: CPT

## 2018-11-20 PROCEDURE — 99233 PR SUBSEQUENT HOSPITAL CARE,LEVL III: ICD-10-PCS | Mod: ,,, | Performed by: INTERNAL MEDICINE

## 2018-11-20 PROCEDURE — 27000221 HC OXYGEN, UP TO 24 HOURS

## 2018-11-20 PROCEDURE — 85730 THROMBOPLASTIN TIME PARTIAL: CPT

## 2018-11-20 PROCEDURE — 63600175 PHARM REV CODE 636 W HCPCS: Performed by: STUDENT IN AN ORGANIZED HEALTH CARE EDUCATION/TRAINING PROGRAM

## 2018-11-20 PROCEDURE — 80069 RENAL FUNCTION PANEL: CPT

## 2018-11-20 PROCEDURE — 80100008 HC CRRT DAILY MAINTENANCE

## 2018-11-20 PROCEDURE — 85027 COMPLETE CBC AUTOMATED: CPT

## 2018-11-20 PROCEDURE — 87449 NOS EACH ORGANISM AG IA: CPT

## 2018-11-20 PROCEDURE — 90945 DIALYSIS ONE EVALUATION: CPT

## 2018-11-20 PROCEDURE — 83735 ASSAY OF MAGNESIUM: CPT

## 2018-11-20 PROCEDURE — 25000003 PHARM REV CODE 250: Performed by: STUDENT IN AN ORGANIZED HEALTH CARE EDUCATION/TRAINING PROGRAM

## 2018-11-20 PROCEDURE — 85007 BL SMEAR W/DIFF WBC COUNT: CPT | Mod: 91

## 2018-11-20 PROCEDURE — 90945 DIALYSIS ONE EVALUATION: CPT | Mod: ,,, | Performed by: INTERNAL MEDICINE

## 2018-11-20 PROCEDURE — 63600175 PHARM REV CODE 636 W HCPCS: Performed by: TRANSPLANT SURGERY

## 2018-11-20 PROCEDURE — 90945 PR DIALYSIS, NOT HEMO, 1 EVAL: ICD-10-PCS | Mod: ,,, | Performed by: INTERNAL MEDICINE

## 2018-11-20 PROCEDURE — 25000003 PHARM REV CODE 250: Performed by: INTERNAL MEDICINE

## 2018-11-20 PROCEDURE — 83735 ASSAY OF MAGNESIUM: CPT | Mod: 91

## 2018-11-20 PROCEDURE — 84100 ASSAY OF PHOSPHORUS: CPT | Mod: 91

## 2018-11-20 PROCEDURE — 99233 SBSQ HOSP IP/OBS HIGH 50: CPT | Mod: ,,, | Performed by: INTERNAL MEDICINE

## 2018-11-20 PROCEDURE — 99233 SBSQ HOSP IP/OBS HIGH 50: CPT | Mod: 24,,, | Performed by: SURGERY

## 2018-11-20 PROCEDURE — 94761 N-INVAS EAR/PLS OXIMETRY MLT: CPT

## 2018-11-20 PROCEDURE — 99232 SBSQ HOSP IP/OBS MODERATE 35: CPT | Mod: ,,, | Performed by: NURSE PRACTITIONER

## 2018-11-20 PROCEDURE — 25000003 PHARM REV CODE 250: Performed by: HOSPITALIST

## 2018-11-20 PROCEDURE — 85730 THROMBOPLASTIN TIME PARTIAL: CPT | Mod: 91

## 2018-11-20 PROCEDURE — 99233 PR SUBSEQUENT HOSPITAL CARE,LEVL III: ICD-10-PCS | Mod: 24,,, | Performed by: SURGERY

## 2018-11-20 PROCEDURE — 63600175 PHARM REV CODE 636 W HCPCS: Performed by: INTERNAL MEDICINE

## 2018-11-20 PROCEDURE — 20000000 HC ICU ROOM

## 2018-11-20 PROCEDURE — 86850 RBC ANTIBODY SCREEN: CPT

## 2018-11-20 PROCEDURE — 80053 COMPREHEN METABOLIC PANEL: CPT

## 2018-11-20 PROCEDURE — 82977 ASSAY OF GGT: CPT

## 2018-11-20 RX ORDER — INSULIN ASPART 100 [IU]/ML
6-8 INJECTION, SOLUTION INTRAVENOUS; SUBCUTANEOUS
Status: DISCONTINUED | OUTPATIENT
Start: 2018-11-20 | End: 2018-11-21

## 2018-11-20 RX ORDER — HYDROCODONE BITARTRATE AND ACETAMINOPHEN 500; 5 MG/1; MG/1
TABLET ORAL CONTINUOUS
Status: ACTIVE | OUTPATIENT
Start: 2018-11-20 | End: 2018-11-21

## 2018-11-20 RX ORDER — MAGNESIUM SULFATE HEPTAHYDRATE 40 MG/ML
2 INJECTION, SOLUTION INTRAVENOUS
Status: ACTIVE | OUTPATIENT
Start: 2018-11-20 | End: 2018-11-21

## 2018-11-20 RX ORDER — ALBUMIN HUMAN 50 G/1000ML
12.5 SOLUTION INTRAVENOUS ONCE
Status: COMPLETED | OUTPATIENT
Start: 2018-11-20 | End: 2018-11-20

## 2018-11-20 RX ADMIN — HYDROCORTISONE SODIUM SUCCINATE 25 MG: 100 INJECTION, POWDER, FOR SOLUTION INTRAMUSCULAR; INTRAVENOUS at 01:11

## 2018-11-20 RX ADMIN — FAMOTIDINE 20 MG: 20 TABLET ORAL at 09:11

## 2018-11-20 RX ADMIN — DIBASIC SODIUM PHOSPHATE, MONOBASIC POTASSIUM PHOSPHATE AND MONOBASIC SODIUM PHOSPHATE 2 TABLET: 852; 155; 130 TABLET ORAL at 04:11

## 2018-11-20 RX ADMIN — INSULIN ASPART 2 UNITS: 100 INJECTION, SOLUTION INTRAVENOUS; SUBCUTANEOUS at 04:11

## 2018-11-20 RX ADMIN — DIBASIC SODIUM PHOSPHATE, MONOBASIC POTASSIUM PHOSPHATE AND MONOBASIC SODIUM PHOSPHATE 2 TABLET: 852; 155; 130 TABLET ORAL at 09:11

## 2018-11-20 RX ADMIN — Medication 0.2 MG: at 05:11

## 2018-11-20 RX ADMIN — HEPARIN SODIUM 5000 UNITS: 5000 INJECTION, SOLUTION INTRAVENOUS; SUBCUTANEOUS at 01:11

## 2018-11-20 RX ADMIN — CIPROFLOXACIN HYDROCHLORIDE 750 MG: 250 TABLET, FILM COATED ORAL at 09:11

## 2018-11-20 RX ADMIN — ISAVUCONAZONIUM SULFATE 372 MG: 186 CAPSULE ORAL at 02:11

## 2018-11-20 RX ADMIN — ALBUMIN HUMAN 12.5 G: 0.05 INJECTION, SOLUTION INTRAVENOUS at 02:11

## 2018-11-20 RX ADMIN — TACROLIMUS 1 MG: 1 CAPSULE ORAL at 09:11

## 2018-11-20 RX ADMIN — HEPARIN SODIUM 5000 UNITS: 5000 INJECTION, SOLUTION INTRAVENOUS; SUBCUTANEOUS at 05:11

## 2018-11-20 RX ADMIN — INSULIN ASPART 2 UNITS: 100 INJECTION, SOLUTION INTRAVENOUS; SUBCUTANEOUS at 08:11

## 2018-11-20 RX ADMIN — RAMELTEON 8 MG: 8 TABLET, FILM COATED ORAL at 09:11

## 2018-11-20 RX ADMIN — ALBUMIN HUMAN 12.5 G: 0.05 INJECTION, SOLUTION INTRAVENOUS at 11:11

## 2018-11-20 RX ADMIN — LINEZOLID 600 MG: 600 TABLET, FILM COATED ORAL at 09:11

## 2018-11-20 RX ADMIN — HYDROCORTISONE SODIUM SUCCINATE 25 MG: 100 INJECTION, POWDER, FOR SOLUTION INTRAMUSCULAR; INTRAVENOUS at 09:11

## 2018-11-20 RX ADMIN — SODIUM CHLORIDE: 0.9 INJECTION, SOLUTION INTRAVENOUS at 04:11

## 2018-11-20 RX ADMIN — HEPARIN SODIUM 5000 UNITS: 5000 INJECTION, SOLUTION INTRAVENOUS; SUBCUTANEOUS at 09:11

## 2018-11-20 RX ADMIN — HYDROCORTISONE SODIUM SUCCINATE 50 MG: 100 INJECTION, POWDER, FOR SOLUTION INTRAMUSCULAR; INTRAVENOUS at 05:11

## 2018-11-20 RX ADMIN — INSULIN ASPART 2 UNITS: 100 INJECTION, SOLUTION INTRAVENOUS; SUBCUTANEOUS at 12:11

## 2018-11-20 RX ADMIN — INSULIN ASPART 2 UNITS: 100 INJECTION, SOLUTION INTRAVENOUS; SUBCUTANEOUS at 01:11

## 2018-11-20 RX ADMIN — MAGNESIUM SULFATE IN WATER 2 G: 40 INJECTION, SOLUTION INTRAVENOUS at 04:11

## 2018-11-20 RX ADMIN — ISAVUCONAZONIUM SULFATE 372 MG: 186 CAPSULE ORAL at 12:11

## 2018-11-20 RX ADMIN — OXYCODONE HYDROCHLORIDE 5 MG: 5 TABLET ORAL at 05:11

## 2018-11-20 NOTE — PROGRESS NOTES
[facility]  Infectious Disease  Progress Note    Patient Name: Femi Enciso  MRN: [id number]  Admission Date: 10/27/2018  Length of Stay: 24 days  Attending Physician: Femi Patel MD  Primary Care Provider: Primary Doctor No    Isolation Status: Special Contact  Assessment/Plan:      Delirium    52yo man w/a history of DM2 and alcohol dependence with associated hepatitis who was admitted on 10/27/2018 with acute liver failure (c/b PSE, HRS, hepatic hydrothorax) and ultimately underwent liver transplantation (s/p DBDLT 11/11/2018, CMV D+/R-, steroid induction, on maintenance tacro/MMF/pred; c/b post-operative delirium, VRE bacteruria, and IA hemorrhage requiring re-do ex-lap, RP/retrorenal/retrocolic hematoma evacuation, cauterization of RP bleed, partial right adrenalectomy, and temporary vac abdominal closure on 11/16/2018 and planned benign second look washout on 11/18/2018). Patient has stabilized since bleed cauterization but has grown VRE.faecium from washout cultures and has mild bilious appearance of his peritoneal drain fluid (with bilirubin 16), concerning for indolent bile leak not seen grossly in OR.    - would continue linezolid (VRE) and empiric ciprofloxacin (PCN/ceph allergy) for now  - would continue posaconazole for mold prophylaxis per institutional protocol  - remainder of prophylaxis per protocol  - will f/u pending operative cultures to tailor therapy  - additional investigations for possible ongoing leakage of bile per primary surgical team         Anticipated Disposition: pending improvement    Thank you for your consult. I will follow-up with patient. Please contact us if you have any additional questions.     Rosalee Ireland MD  Transplant ID Attending  [phone]    Rosalee Ireland MD  Infectious Disease  [facility]    Subjective:     Principal Problem:S/P liver transplant    HPI: No notes on file  Interval History: Improved again with regards to AMS  today per family. Afebrile. VRE in washout cultures and elevated drain bilirubin of 16 suggestive of bile leak.    Review of Systems   Constitutional: Negative for activity change, appetite change, chills, diaphoresis and fever.   HENT: Negative for ear pain, mouth sores, sinus pressure and sore throat.    Eyes: Negative for photophobia, pain and redness.   Respiratory: Negative for cough, shortness of breath and wheezing.    Cardiovascular: Positive for leg swelling. Negative for chest pain.   Gastrointestinal: Positive for abdominal pain. Negative for abdominal distention, diarrhea and nausea.   Genitourinary: Negative for dysuria, flank pain, frequency and urgency.   Musculoskeletal: Negative for arthralgias, back pain, gait problem and myalgias.   Skin: Negative for pallor and rash.   Neurological: Negative for dizziness, tremors, seizures and headaches.   Psychiatric/Behavioral: Negative for confusion.     Objective:     Vital Signs (Most Recent):  Temp: 98.6 °F (37 °C) (11/20/18 1500)  Pulse: 92 (11/20/18 1515)  Resp: (!) 26 (11/20/18 1500)  BP: 109/72 (11/20/18 1230)  SpO2: 98 % (11/20/18 1500) Vital Signs (24h Range):  Temp:  [98.3 °F (36.8 °C)-99.7 °F (37.6 °C)] 98.6 °F (37 °C)  Pulse:  [] 92  Resp:  [11-35] 26  SpO2:  [97 %-100 %] 98 %  BP: ()/(59-76) 109/72  Arterial Line BP: ()/(47-73) 125/61     Weight: 98.1 kg (216 lb 4.3 oz)  Body mass index is 31.03 kg/m².    Estimated Creatinine Clearance: 125.4 mL/min (based on SCr of 0.8 mg/dL).    Physical Exam   Constitutional: He appears well-developed. No distress.   HENT:   Head: Atraumatic.   Mouth/Throat: Oropharynx is clear and moist. No oropharyngeal exudate.   Eyes: Conjunctivae and EOM are normal. Pupils are equal, round, and reactive to light. Scleral icterus is present.   Neck: Neck supple.   Cardiovascular: Normal rate and regular rhythm. Exam reveals no friction rub.   No murmur heard.  Pulmonary/Chest: Breath sounds normal. No  respiratory distress. He has no wheezes. He has no rales. He exhibits no tenderness.   Abdominal: Soft. Bowel sounds are normal. He exhibits distension. There is tenderness. There is no rebound and no guarding.   Brownish SS fluid in [employer] dressed.   Musculoskeletal: Normal range of motion. He exhibits edema.   Lymphadenopathy:     He has no cervical adenopathy.   Neurological: He is alert.   Skin: No rash noted. No erythema.       Significant Labs:   CBC:   Recent Labs   Lab 11/20/18  0002 11/20/18  0300 11/20/18  0746   WBC 28.72* 29.11* 32.33*   HGB 10.1* 9.6* 10.1*   HCT 30.1* 28.7* 29.9*   PLT 70* 70* 69*     CMP:   Recent Labs   Lab 11/20/18  0002 11/20/18  0300 11/20/18  0746    138  138 138   K 4.4 4.2  4.2 4.2    106  106 107   CO2 24 27  27 23   * 163*  163* 190*   BUN 12 12  12 12   CREATININE 0.9 0.8  0.8 0.8   CALCIUM 7.2* 7.4*  7.4* 7.4*   PROT 3.6* 3.8* 3.7*   ALBUMIN 1.9* 2.1*  2.1* 2.0*   BILITOT 13.9* 13.6* 13.6*   ALKPHOS 210* 199* 203*   AST 32 29 27   ALT 60* 56* 56*   ANIONGAP 7* 5*  5* 8   EGFRNONAA >60.0 >60.0  >60.0 >60.0       Significant Imaging: I have reviewed all pertinent imaging results/findings within the past 24 hours.

## 2018-11-20 NOTE — ASSESSMENT & PLAN NOTE
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   - s/p OHLTx 11/11/2018; intra-op SLED  - remaining anuric, but volume status and clearance number seem stable/ok on clinical exam, however net positive by almost 5L per recorded Is & Os and would be concerned he may become volume overloaded very quickly if this continues    Plan:  -continue SLED, mostly for volume and as tolerated by hemodynamics, run on machine until tomorrow morning and reassess

## 2018-11-20 NOTE — PROGRESS NOTES
"[facility]  Endocrinology  Progress Note    Admit Date: 10/27/2018     Reason for Consult: Management of Hyperglycemia and type 2 DM    Surgical Procedure and Date: liver transplant 18; exploratory lap and liver biopsy on 18      Diabetes diagnosis year: ~ 3-4 years ago     Home Diabetes Medications:  none; previously on metformin 1000 mg BID    How often checking glucose at home? Not checking  BG readings on regimen: n/a  Hypoglycemia on the regimen?  n/a  Missed doses on regimen? n/a    Diabetes Complications include:     none    Complicating diabetes co morbidities:   CIRRHOSIS and Glucocorticoid use       HPI:   Patient is a 53 y.o. male with a diagnosis of ESLD secondary to ETOH abuse and DEL with ESRD with RRT. Patient is now s/p liver transplant. Endocrinology consulted for BG management.       Lab Results   Component Value Date    HGBA1C 4.4 2018             Interval HPI:   Overnight events:  Remains in ICU. NAEON. CRRT. BG at or slightly above goal on insulin infusion at 1.4 u/hr and correction scale coverage. Hydrocortisone 50 mg every 8 hours.   Eatin% -75% of clears; diet to advance   Nausea: No  Hypoglycemia and intervention: No  Fever: No   TPN: at 60 cc/hr     /71 (BP Location: Left arm, Patient Position: Lying)   Pulse 94   Temp 98.8 °F (37.1 °C) (Oral)   Resp 14   Ht 5' 10" (1.778 m)   Wt 98.1 kg (216 lb 4.3 oz)   SpO2 99%   BMI 31.03 kg/m²      Labs Reviewed and Include    Recent Labs   Lab 18  0300   *  163*   CALCIUM 7.4*  7.4*   ALBUMIN 2.1*  2.1*   PROT 3.8*     138   K 4.2  4.2   CO2 27  27     106   BUN 12  12   CREATININE 0.8  0.8   ALKPHOS 199*   ALT 56*   AST 29   BILITOT 13.6*     Lab Results   Component Value Date    WBC 29.11 (H) 2018    HGB 9.6 (L) 2018    HCT 28.7 (L) 2018    MCV 89 2018    PLT 70 (L) 2018     No results for input(s): TSH, FREET4 in the last 168 " hours.  Lab Results   Component Value Date    HGBA1C 4.4 11/09/2018       Nutritional status:   Body mass index is 31.03 kg/m².  Lab Results   Component Value Date    ALBUMIN 2.1 (L) 11/20/2018    ALBUMIN 2.1 (L) 11/20/2018    ALBUMIN 1.9 (L) 11/20/2018     Lab Results   Component Value Date    PREALBUMIN 7 (L) 10/27/2018       Estimated Creatinine Clearance: 125.4 mL/min (based on SCr of 0.8 mg/dL).    Accu-Checks  Recent Labs     11/18/18  1618 11/18/18  2021 11/19/18  0001 11/19/18  0528 11/19/18  0743 11/19/18  1212 11/19/18  1609 11/19/18  2126 11/20/18  0013 11/20/18  0400   POCTGLUCOSE 305* 211* 210* 210* 208* 218* 190* 236* 168* 168*       Current Medications and/or Treatments Impacting Glycemic Control  Immunotherapy:    Immunosuppressants         Stop Route Frequency     tacrolimus capsule 1 mg      -- Oral 2 times daily        Steroids:   Hormones (From admission, onward)    Start     Stop Route Frequency Ordered    11/17/18 1400  hydrocortisone sodium succinate injection 50 mg      -- IV Every 8 hours 11/17/18 0859    11/09/18 1345  methylPREDNISolone sodium succinate injection 500 mg  (Med - Immunosuppression Induction Therapy (Methylprednisolone))      11/10 0144 IV Once pre-op 11/09/18 1236        Pressors:    Autonomic Drugs (From admission, onward)    None        Hyperglycemia/Diabetes Medications:   Antihyperglycemics (From admission, onward)    Start     Stop Route Frequency Ordered    11/18/18 0830  insulin regular (Humulin R) 100 Units in sodium chloride 0.9% 100 mL infusion      -- IV Continuous 11/18/18 0726    11/18/18 0826  insulin aspart U-100 pen 0-10 Units      -- SubQ As needed (PRN) 11/18/18 0726          ASSESSMENT and PLAN    * S/P liver transplant    Managed per LTS.   avoid hypoglycemia  Optimize BG control for surgical wound healing.     Lab Results   Component Value Date    ALT 56 (H) 11/20/2018    AST 29 11/20/2018     (H) 11/20/2018    ALKPHOS 199 (H) 11/20/2018     BILITOT 13.6 (H) 11/20/2018            Type 2 diabetes mellitus without complication    BG goal 140-180      Increase IV transition insulin infusion at 1.6 u/hr.   BG monitoring every ac/hs/0200 and moderate dose correction scale.   Diet advanced: start novolog 6-8 units with meals.     Discharge plans- TBD     Sequelae of hyperalimentation    Elevating BG readings.        Adrenal cortical steroids causing adverse effect in therapeutic use    On standard steroid taper per transplant team; may elevate BG readings         DEL (acute kidney injury)    Avoid insulin stacking and hypoglycemia.  CRRT per nephrology  Lab Results   Component Value Date    CREATININE 0.8 11/20/2018    CREATININE 0.8 11/20/2018            Acute renal failure    Avoid insulin stacking and hypoglycemia.         Prophylactic immunotherapy    May increase insulin resistance.            Tessa Falk NP  Endocrinology  [facility]

## 2018-11-20 NOTE — PROGRESS NOTES
VITALIY met with pt and pts wife at bedside to provide local lodging fee at , fee will be $16.66 a day.  Pts wife verbalized understanding.   Pt awake, alert and oriented x 4 engaging and communicative.  Pt in good spirits and is ready to step down to TSU today.   SW remains available for all transplant resources, education and psychosocial support and assist with all dc planning needs.

## 2018-11-20 NOTE — ASSESSMENT & PLAN NOTE
Avoid insulin stacking and hypoglycemia.  CRRT per nephrology  Lab Results   Component Value Date    CREATININE 0.8 11/20/2018    CREATININE 0.8 11/20/2018

## 2018-11-20 NOTE — ASSESSMENT & PLAN NOTE
BG goal 140-180      Increase IV transition insulin infusion at 1.6 u/hr.   BG monitoring every ac/hs/0200 and moderate dose correction scale.   Diet advanced: start novolog 6-8 units with meals.     Discharge plans- TBD

## 2018-11-20 NOTE — ASSESSMENT & PLAN NOTE
Managed per LTS.   avoid hypoglycemia  Optimize BG control for surgical wound healing.     Lab Results   Component Value Date    ALT 56 (H) 11/20/2018    AST 29 11/20/2018     (H) 11/20/2018    ALKPHOS 199 (H) 11/20/2018    BILITOT 13.6 (H) 11/20/2018

## 2018-11-20 NOTE — ASSESSMENT & PLAN NOTE
Assessment/Plan:   Femi Enciso is a 53 y.o. male now s/p OLT         * S/P liver transplant        53 year old male with history of ESLD 2/2 ETOH cirrhosis who is s/p liver transplant. POD#5     Neuro  -off sedation  -pain: dilaudid, oxycodone prn     CV  -HDS, off pressors  -swan tricia catheter removed  -monitor on telemetry      Resp  -extubated. sats >95 on NC  -ABGs prn  -daily CXR   -duonebs prn  -CXR improved from repeat yesterday      Heme  -H/H-->9.6/28.7  -INR 1.5  -q4 CBCs, transfuse products as necessary     ID  -AF  -f/u cultures   -Abx: Cipro, fluconazole and linezolid      MSK  -OOBTC this AM     FEN/GI  -replace lytes prn  -CLD  - TPN @ 60  -s/p ex-lap 11/16 and 11/18   - CT abdomen/pelvis (non-contrast) yesterday appears to demonstrate stability      Endocrine  -insulin as needed  -accuchecks       -CRRT per renal     PPX:  -SQH, PPI     dispo  -continue ICU care

## 2018-11-20 NOTE — SUBJECTIVE & OBJECTIVE
Interval History: on SLED since yesterday morning, stable hemodynamics, stilll net positive by close to 5L, received 1000ccs worth of albumin yesterday and also piggy backs, in no distress or complaints    Review of patient's allergies indicates:   Allergen Reactions    Cefepime Rash    Penicillins Nausea And Vomiting and Rash     Full body rash from cefepime as well     Current Facility-Administered Medications   Medication Frequency    0.9%  NaCl infusion (CRRT USE ONLY) Continuous    ciprofloxacin HCl tablet 750 mg Daily    dextrose 50% injection 12.5 g PRN    dextrose 50% injection 25 g PRN    famotidine tablet 20 mg QHS    fat emulsion 20 % infusion 250 mL Daily    glucagon (human recombinant) injection 1 mg PRN    glucose chewable tablet 16 g PRN    glucose chewable tablet 24 g PRN    heparin (porcine) injection 5,000 Units Q8H    hydrocortisone sodium succinate injection 25 mg Q8H    HYDROmorphone injection 0.2 mg Q3H PRN    insulin aspart U-100 pen 0-10 Units PRN    insulin aspart U-100 pen 6-8 Units TIDWM    insulin regular (Humulin R) 100 Units in sodium chloride 0.9% 100 mL infusion Continuous    isavuconazonium sulfate Cap 372 mg Daily    k phos di & mono-sod phos mono 250 mg tablet 2 tablet TID    linezolid tablet 600 mg Q12H    magnesium sulfate 2g in water 50mL IVPB (premix) PRN    oxyCODONE immediate release tablet 5 mg Q4H PRN    oxyCODONE immediate release tablet Tab 10 mg Q4H PRN    potassium phosphate 15 mmol in dextrose 5 % 250 mL infusion BID PRN    ramelteon tablet 8 mg Nightly PRN    tacrolimus capsule 1 mg BID    TPN ADULT CENTRAL LINE CUSTOM Continuous    [START ON 11/21/2018] valGANciclovir tablet 450 mg Every Mon, Wed, Fri       Objective:     Vital Signs (Most Recent):  Temp: 99 °F (37.2 °C) (11/20/18 1100)  Pulse: 92 (11/20/18 1330)  Resp: (!) 27 (11/20/18 1330)  BP: 109/72 (11/20/18 1230)  SpO2: 100 % (11/20/18 1330)  O2 Device (Oxygen Therapy): nasal  cannula (11/20/18 1227) Vital Signs (24h Range):  Temp:  [98.1 °F (36.7 °C)-99.7 °F (37.6 °C)] 99 °F (37.2 °C)  Pulse:  [] 92  Resp:  [11-35] 27  SpO2:  [97 %-100 %] 100 %  BP: ()/(59-76) 109/72  Arterial Line BP: ()/(47-73) 134/64     Weight: 98.1 kg (216 lb 4.3 oz) (11/15/18 0500)  Body mass index is 31.03 kg/m².  Body surface area is 2.2 meters squared.    I/O last 3 completed shifts:  In: 75377.9 [P.O.:560; I.V.:7250.1; IV Piggyback:1000]  Out: 8364 [Urine:5; Drains:1590; Other:6769]    Physical Exam   Constitutional: He is oriented to person, place, and time. He appears well-developed.   HENT:   Head: Normocephalic and atraumatic.   Eyes: EOM are normal.   Neck: No JVD present.   Cardiovascular: Normal rate and regular rhythm. Exam reveals no friction rub.   Pulmonary/Chest: Effort normal and breath sounds normal.   Abdominal: Soft. He exhibits distension.   Musculoskeletal: He exhibits edema.   Neurological: He is alert and oriented to person, place, and time.   Skin: Skin is warm.   Psychiatric: He has a normal mood and affect.

## 2018-11-20 NOTE — ASSESSMENT & PLAN NOTE
53 year old male with history of ESLD 2/2 ETOH cirrhosis who is s/p liver transplant c/b take-back x 3 for bleeding most recently 11/18    Neuro  -acute change in mental status with confusion and agitation on 11/14, controlled with PRN ativan   -monitor neuro exam when sedation weaned, currently sedated with propofol - alert and oriented, responds appropriately off sedation    CV  -remained off pressors for last 48hrs, continue to monitor and leave off as tolerated  -swan tricia catheter removed  -monitor on telemetry     Resp  -Extubated, on nasal canula O2, wean as tolerated  -Oxygen Concentration (%):  [21] 21  -ABGs & CXR prn  -continue to monitor O2 sats   -CXR stable      Heme  -H/H stable overnight   -INR 1.5 from 1.3, continue to monitor   -daily CBCs, transfuse as needed     ID  -monitor fever and WBC, WBC increased to 29 from 17 today   -f/u cultures: enterococcus faecium - susceptibilities pending   -Abx: Cipro, fluconazole and linezolid     MSK  -PT/OT  -Encourage OOBTC    FEN/GI  -replace lytes   -Renal diet  -s/p ex-lap 11/16 requiring take-back for bleeding, left open, now s/p closure 11/18  - TPN to run out tonight     Endocrine  -insulin as needed. Monitor BG       -CRRT per renal, would like to increase UF of CRRT today     dispo  -stepdown to TSU today when bed available

## 2018-11-20 NOTE — SUBJECTIVE & OBJECTIVE
Interval History: Improved again with regards to AMS today per family. Afebrile. VRE in washout cultures and elevated drain bilirubin of 16 suggestive of bile leak.    Review of Systems   Constitutional: Negative for activity change, appetite change, chills, diaphoresis and fever.   HENT: Negative for ear pain, mouth sores, sinus pressure and sore throat.    Eyes: Negative for photophobia, pain and redness.   Respiratory: Negative for cough, shortness of breath and wheezing.    Cardiovascular: Positive for leg swelling. Negative for chest pain.   Gastrointestinal: Positive for abdominal pain. Negative for abdominal distention, diarrhea and nausea.   Genitourinary: Negative for dysuria, flank pain, frequency and urgency.   Musculoskeletal: Negative for arthralgias, back pain, gait problem and myalgias.   Skin: Negative for pallor and rash.   Neurological: Negative for dizziness, tremors, seizures and headaches.   Psychiatric/Behavioral: Negative for confusion.     Objective:     Vital Signs (Most Recent):  Temp: 98.6 °F (37 °C) (11/20/18 1500)  Pulse: 92 (11/20/18 1515)  Resp: (!) 26 (11/20/18 1500)  BP: 109/72 (11/20/18 1230)  SpO2: 98 % (11/20/18 1500) Vital Signs (24h Range):  Temp:  [98.3 °F (36.8 °C)-99.7 °F (37.6 °C)] 98.6 °F (37 °C)  Pulse:  [] 92  Resp:  [11-35] 26  SpO2:  [97 %-100 %] 98 %  BP: ()/(59-76) 109/72  Arterial Line BP: ()/(47-73) 125/61     Weight: 98.1 kg (216 lb 4.3 oz)  Body mass index is 31.03 kg/m².    Estimated Creatinine Clearance: 125.4 mL/min (based on SCr of 0.8 mg/dL).    Physical Exam   Constitutional: He appears well-developed. No distress.   HENT:   Head: Atraumatic.   Mouth/Throat: Oropharynx is clear and moist. No oropharyngeal exudate.   Eyes: Conjunctivae and EOM are normal. Pupils are equal, round, and reactive to light. Scleral icterus is present.   Neck: Neck supple.   Cardiovascular: Normal rate and regular rhythm. Exam reveals no friction rub.   No murmur  heard.  Pulmonary/Chest: Breath sounds normal. No respiratory distress. He has no wheezes. He has no rales. He exhibits no tenderness.   Abdominal: Soft. Bowel sounds are normal. He exhibits distension. There is tenderness. There is no rebound and no guarding.   Brownish SS fluid in JOSE A. [place] dressed.   Musculoskeletal: Normal range of motion. He exhibits edema.   Lymphadenopathy:     He has no cervical adenopathy.   Neurological: He is alert.   Skin: No rash noted. No erythema.       Significant Labs:   CBC:   Recent Labs   Lab 11/20/18  0002 11/20/18  0300 11/20/18  0746   WBC 28.72* 29.11* 32.33*   HGB 10.1* 9.6* 10.1*   HCT 30.1* 28.7* 29.9*   PLT 70* 70* 69*     CMP:   Recent Labs   Lab 11/20/18  0002 11/20/18  0300 11/20/18  0746    138  138 138   K 4.4 4.2  4.2 4.2    106  106 107   CO2 24 27  27 23   * 163*  163* 190*   BUN 12 12  12 12   CREATININE 0.9 0.8  0.8 0.8   CALCIUM 7.2* 7.4*  7.4* 7.4*   PROT 3.6* 3.8* 3.7*   ALBUMIN 1.9* 2.1*  2.1* 2.0*   BILITOT 13.9* 13.6* 13.6*   ALKPHOS 210* 199* 203*   AST 32 29 27   ALT 60* 56* 56*   ANIONGAP 7* 5*  5* 8   EGFRNONAA >60.0 >60.0  >60.0 >60.0       Significant Imaging: I have reviewed all pertinent imaging results/findings within the past 24 hours.

## 2018-11-20 NOTE — ASSESSMENT & PLAN NOTE
52yo man w/a history of DM2 and alcohol dependence with associated hepatitis who was admitted on 10/27/2018 with acute liver failure (c/b PSE, HRS, hepatic hydrothorax) and ultimately underwent liver transplantation (s/p DBDLT 11/11/2018, CMV D+/R-, steroid induction, on maintenance tacro/MMF/pred; c/b post-operative delirium, VRE bacteruria, and IA hemorrhage requiring re-do ex-lap, RP/retrorenal/retrocolic hematoma evacuation, cauterization of RP bleed, partial right adrenalectomy, and temporary vac abdominal closure on 11/16/2018 and planned benign second look washout on 11/18/2018). Patient has stabilized since bleed cauterization but has grown VRE.faecium from washout cultures and has mild bilious appearance of his peritoneal drain fluid (with bilirubin 16), concerning for indolent bile leak not seen grossly in OR.    - would continue linezolid (VRE) and empiric ciprofloxacin (PCN/ceph allergy) for now  - would continue posaconazole for mold prophylaxis per institutional protocol  - remainder of prophylaxis per protocol  - will f/u pending operative cultures to tailor therapy  - additional investigations for possible ongoing leakage of bile per primary surgical team

## 2018-11-20 NOTE — SUBJECTIVE & OBJECTIVE
"Interval HPI:   Overnight events:  Remains in ICU. NAEON. CRRT. BG at or slightly above goal on insulin infusion at 1.4 u/hr and correction scale coverage. Hydrocortisone 50 mg every 8 hours.   Eatin% -75% of clears; diet to advance   Nausea: No  Hypoglycemia and intervention: No  Fever: No   TPN: at 60 cc/hr     /71 (BP Location: Left arm, Patient Position: Lying)   Pulse 94   Temp 98.8 °F (37.1 °C) (Oral)   Resp 14   Ht 5' 10" (1.778 m)   Wt 98.1 kg (216 lb 4.3 oz)   SpO2 99%   BMI 31.03 kg/m²     Labs Reviewed and Include    Recent Labs   Lab 18  0300   *  163*   CALCIUM 7.4*  7.4*   ALBUMIN 2.1*  2.1*   PROT 3.8*     138   K 4.2  4.2   CO2 27  27     106   BUN 12  12   CREATININE 0.8  0.8   ALKPHOS 199*   ALT 56*   AST 29   BILITOT 13.6*     Lab Results   Component Value Date    WBC 29.11 (H) 2018    HGB 9.6 (L) 2018    HCT 28.7 (L) 2018    MCV 89 2018    PLT 70 (L) 2018     No results for input(s): TSH, FREET4 in the last 168 hours.  Lab Results   Component Value Date    HGBA1C 4.4 2018       Nutritional status:   Body mass index is 31.03 kg/m².  Lab Results   Component Value Date    ALBUMIN 2.1 (L) 2018    ALBUMIN 2.1 (L) 2018    ALBUMIN 1.9 (L) 2018     Lab Results   Component Value Date    PREALBUMIN 7 (L) 10/27/2018       Estimated Creatinine Clearance: 125.4 mL/min (based on SCr of 0.8 mg/dL).    Accu-Checks  Recent Labs     18  1618 18  20218  0001 18  0528 18  0743 18  1212 18  1609 18  2126 18  0013 18  0400   POCTGLUCOSE 305* 211* 210* 210* 208* 218* 190* 236* 168* 168*       Current Medications and/or Treatments Impacting Glycemic Control  Immunotherapy:    Immunosuppressants         Stop Route Frequency     tacrolimus capsule 1 mg      -- Oral 2 times daily        Steroids:   Hormones (From admission, onward)    Start     Stop Route " Frequency Ordered    11/17/18 1400  hydrocortisone sodium succinate injection 50 mg      -- IV Every 8 hours 11/17/18 0859    11/09/18 1345  methylPREDNISolone sodium succinate injection 500 mg  (Med - Immunosuppression Induction Therapy (Methylprednisolone))      11/10 0144 IV Once pre-op 11/09/18 1236        Pressors:    Autonomic Drugs (From admission, onward)    None        Hyperglycemia/Diabetes Medications:   Antihyperglycemics (From admission, onward)    Start     Stop Route Frequency Ordered    11/18/18 0830  insulin regular (Humulin R) 100 Units in sodium chloride 0.9% 100 mL infusion      -- IV Continuous 11/18/18 0726    11/18/18 0826  insulin aspart U-100 pen 0-10 Units      -- SubQ As needed (PRN) 11/18/18 0726

## 2018-11-20 NOTE — SUBJECTIVE & OBJECTIVE
[facility]  Liver Transplant  Progress Note     Patient Name: Femi Enciso  MRN: [id number]  Admission Date: 10/27/2018  Hospital Length of Stay: 22 days  Code Status: Full Code  Primary Care Provider: Primary Doctor No  Post-Op Day 3 Days Post-Op        Subjective:      Hospital/ICU Course:  11/11: s/p liver transplant  11/12 - extubated, weaned off pressors; pulled out all 3 JOSE A drains  11/13 - CRRT held overnight; 1U Plt  11/14 -2u pRBC  11/15- 1u prbc  11/16- OR for bleeding, abthera + JOSE A x 1, 7 PRBC, 3 plt, 1 FFP  11/17- 2U pRBCs and RU plts   11/18- OR for ex lap, hemostasis achieved     Interval History/Significant Events:  NAEON. HDS, no pressors. On CRRT throughout evening and night. Pt is AAO to person, place and time this AM. Pt tolerating CLD. Pain well controlled this AM.     Follow-up For: Procedure(s) (LRB):  LAPAROTOMY, EXPLORATORY, AFTER LIVER TRANSPLANT, LIKELY  ABDOMINAL CLOSURE (N/A)    Post-Operative Day: 2 Days Post-Op    Scheduled Meds:   ciprofloxacin (CIPRO)400mg/200ml D5W IVPB  400 mg Intravenous Q12H    famotidine (PF)  20 mg Intravenous Daily    heparin (porcine)  5,000 Units Subcutaneous Q8H    hydrocortisone sodium succinate  50 mg Intravenous Q8H    custom IVPB builder   Intravenous Q8H     Followed by    [START ON 11/20/2018] custom IVPB builder   Intravenous Q24H    k phos di & mono-sod phos mono  2 tablet Oral TID    linezolid 600mg/300ml  600 mg Intravenous Q12H    sodium phosphate IVPB  30 mmol Intravenous Once    [START ON 11/21/2018] valganciclovir 50 mg/ml  450 mg Per NG tube Daily      Continuous Infusions:   sodium chloride 0.9% 200 mL/hr at 11/18/18 1115    insulin (HUMAN R) infusion (adults) 0.8 Units/hr (11/18/18 1200)    norepinephrine bitartrate-D5W Stopped (11/17/18 1100)    propofol 30 mcg/kg/min (11/18/18 1230)    TPN ADULT CENTRAL LINE CUSTOM 60 mL/hr at 11/18/18 1200    TPN ADULT CENTRAL LINE CUSTOM        PRN Meds:sodium chloride,  sodium chloride, sodium chloride, sodium chloride, sodium chloride, sodium chloride, dextrose 50%, dextrose 50%, fentaNYL, glucagon (human recombinant), glucose, glucose, insulin aspart U-100, magnesium sulfate IVPB, potassium phosphate IVPB, ramelteon       Objective:      Vital Signs (Most Recent):  Temp: 97.9 °F (36.6 °C) (11/18/18 1100)  Pulse: 79 (11/18/18 1215)  Resp: (!) 25 (11/18/18 0800)  BP: (!) 100/59 (11/18/18 1200)  SpO2: 100 % (11/18/18 1215) Vital Signs (24h Range):  Temp:  [96.8 °F (36 °C)-98.4 °F (36.9 °C)] 97.9 °F (36.6 °C)  Pulse:  [63-98] 79  Resp:  [10-33] 25  SpO2:  [97 %-100 %] 100 %  BP: ()/(59-80) 100/59  Arterial Line BP: (3-149)/(0-82) 97/50      Weight: 98.1 kg (216 lb 4.3 oz)  Body mass index is 31.03 kg/m².            Intake/Output - Last 3 Shifts        11/16 0700 - 11/17 0659 11/17 0700 - 11/18 0659 11/18 0700 - 11/19 0659     I.V. (mL/kg) 7797.6 (79.5) 8108.2 (82.7) 773 (7.9)     Blood 2456 1081       NG/GT   60       TPN   461       Total Intake(mL/kg) 53928.6 (104.5) 9710.2 (99) 773 (7.9)     Urine (mL/kg/hr)   10 (0) 0 (0)     Drains 120 305 160     Other 4223 7434 2851     Stool           Blood 100         Total Output 4443 7749 3011     Net +5810.6 +1961.2 -2238                         Physical Exam   Constitutional: He appears well-developed.   jaundiced   HENT:   Head: Normocephalic and atraumatic.   Eyes: Scleral icterus is present.   Cardiovascular: Normal rate, regular rhythm and intact distal pulses.   Pulmonary/Chest: Effort normal. No respiratory distress.   Sats > 95 on NC   Abdominal: Soft. He exhibits no distension.   Abdominal dressings with some serous oozing through dressing.   JOSE A drains in place with serous output.   Musculoskeletal: He exhibits edema.   Neurological:   AAOx3  Skin: Skin is warm and dry.   Jaundice improving         Laboratory:      Immunosuppressants      None          Labs within the past 24 hours have been reviewed.     Diagnostic  Results:  I have personally reviewed all pertinent imaging studies.

## 2018-11-20 NOTE — PROGRESS NOTES
CRRT NOTES    Restarted on SELD cont after 17:30mins clotted on previous run.    UF rate at 200cc/hr

## 2018-11-20 NOTE — PROGRESS NOTES
Pt stable throughout shift. Temperature afebrile. HR is NSR (80-95). SBP maintained >100mmHg w/o vasopressors. O2 sats remain >95 with NC at 1L. CRRT UF at 200 to maintain BP. Patient currently appears asleep, free of injury. Please refer to flowsheet data for full assessment details.

## 2018-11-20 NOTE — PROGRESS NOTES
CRRT:    Daily checks done, orders verified. UF rate set to 250ml/hr  ACCESS: right subclavian permanent catheter, with good flow on both ports.

## 2018-11-20 NOTE — PROGRESS NOTES
[facility]  Critical Care - Surgery  Progress Note    Patient Name: Femi Enciso  MRN: [id number]  Admission Date: 10/27/2018  Hospital Length of Stay: 24 days  Code Status: Full Code  Attending Provider: Femi Patel MD  Primary Care Provider: Primary Doctor No   Principal Problem: S/P liver transplant    Subjective:     Hospital/ICU Course:  11/11: s/p liver transplant  11/12 - extubated, weaned off pressors; pulled out all 3 JOSE A drains  11/13 - CRRT held overnight; 1U Plt  11/14 -2u pRBC  11/15- 1u prbc  11/16- OR x 2 for bleeding, abthera + JOSE A x 1, 7 PRBC, 3 plt, 1 FFP  11/17 - 2 Plt, 1 FFP, 2 RBCs    [facility]  Liver Transplant  Progress Note     Patient Name: Femi Enciso  MRN: [id number]  Admission Date: 10/27/2018  Hospital Length of Stay: 22 days  Code Status: Full Code  Primary Care Provider: Primary Doctor No  Post-Op Day 3 Days Post-Op        Subjective:      Hospital/ICU Course:  11/11: s/p liver transplant  11/12 - extubated, weaned off pressors; pulled out all 3 JOSE A drains  11/13 - CRRT held overnight; 1U Plt  11/14 -2u pRBC  11/15- 1u prbc  11/16- OR for bleeding, abthera + JOSE A x 1, 7 PRBC, 3 plt, 1 FFP  11/17- 2U pRBCs and RU plts   11/18- OR for ex lap, hemostasis achieved     Interval History/Significant Events:  NAEON. HDS, no pressors. On CRRT throughout evening and night. Pt is AAO to person, place and time this AM. Pt tolerating CLD. Pain well controlled this AM.     Follow-up For: Procedure(s) (LRB):  LAPAROTOMY, EXPLORATORY, AFTER LIVER TRANSPLANT, LIKELY  ABDOMINAL CLOSURE (N/A)    Post-Operative Day: 2 Days Post-Op    Scheduled Meds:   ciprofloxacin (CIPRO)400mg/200ml D5W IVPB  400 mg Intravenous Q12H    famotidine (PF)  20 mg Intravenous Daily    heparin (porcine)  5,000 Units Subcutaneous Q8H    hydrocortisone sodium succinate  50 mg Intravenous Q8H    custom IVPB builder   Intravenous Q8H     Followed by    [START ON 11/20/2018] custom IVPB builder    Intravenous Q24H    k phos di & mono-sod phos mono  2 tablet Oral TID    linezolid 600mg/300ml  600 mg Intravenous Q12H    sodium phosphate IVPB  30 mmol Intravenous Once    [START ON 11/21/2018] valganciclovir 50 mg/ml  450 mg Per NG tube Daily      Continuous Infusions:   sodium chloride 0.9% 200 mL/hr at 11/18/18 1115    insulin (HUMAN R) infusion (adults) 0.8 Units/hr (11/18/18 1200)    norepinephrine bitartrate-D5W Stopped (11/17/18 1100)    propofol 30 mcg/kg/min (11/18/18 1230)    TPN ADULT CENTRAL LINE CUSTOM 60 mL/hr at 11/18/18 1200    TPN ADULT CENTRAL LINE CUSTOM        PRN Meds:sodium chloride, sodium chloride, sodium chloride, sodium chloride, sodium chloride, sodium chloride, dextrose 50%, dextrose 50%, fentaNYL, glucagon (human recombinant), glucose, glucose, insulin aspart U-100, magnesium sulfate IVPB, potassium phosphate IVPB, ramelteon       Objective:      Vital Signs (Most Recent):  Temp: 97.9 °F (36.6 °C) (11/18/18 1100)  Pulse: 79 (11/18/18 1215)  Resp: (!) 25 (11/18/18 0800)  BP: (!) 100/59 (11/18/18 1200)  SpO2: 100 % (11/18/18 1215) Vital Signs (24h Range):  Temp:  [96.8 °F (36 °C)-98.4 °F (36.9 °C)] 97.9 °F (36.6 °C)  Pulse:  [63-98] 79  Resp:  [10-33] 25  SpO2:  [97 %-100 %] 100 %  BP: ()/(59-80) 100/59  Arterial Line BP: (3-149)/(0-82) 97/50      Weight: 98.1 kg (216 lb 4.3 oz)  Body mass index is 31.03 kg/m².            Intake/Output - Last 3 Shifts        11/16 0700 - 11/17 0659 11/17 0700 - 11/18 0659 11/18 0700 - 11/19 0659     I.V. (mL/kg) 7797.6 (79.5) 8108.2 (82.7) 773 (7.9)     Blood 2456 1081       NG/GT   60       TPN   461       Total Intake(mL/kg) 37756.6 (104.5) 9710.2 (99) 773 (7.9)     Urine (mL/kg/hr)   10 (0) 0 (0)     Drains 120 305 160     Other 4223 7434 2851     Stool           Blood 100         Total Output 4443 7749 3011     Net +5810.6 +1961.2 -2238                         Physical Exam   Constitutional: He appears well-developed.   jaundiced    HENT:   Head: Normocephalic and atraumatic.   Eyes: Scleral icterus is present.   Cardiovascular: Normal rate, regular rhythm and intact distal pulses.   Pulmonary/Chest: Effort normal. No respiratory distress.   Sats > 95 on NC   Abdominal: Soft. He exhibits no distension.   Abdominal dressings with some serous oozing through dressing.   JOSE A drains in place with serous output.   Musculoskeletal: He exhibits edema.   Neurological:   AAOx3  Skin: Skin is warm and dry.   Jaundice improving         Laboratory:      Immunosuppressants      None          Labs within the past 24 hours have been reviewed.     Diagnostic Results:  I have personally reviewed all pertinent imaging studies.                   Assessment/Plan:     * S/P liver transplant    Assessment/Plan:   Femi Enciso is a 53 y.o. male now s/p OLT         * S/P liver transplant        53 year old male with history of ESLD 2/2 ETOH cirrhosis who is s/p liver transplant. POD#5     Neuro  -off sedation  -pain: dilaudid, oxycodone prn     CV  -HDS, off pressors  -swan tricia catheter removed  -monitor on telemetry      Resp  -extubated. sats >95 on NC  -ABGs prn  -daily CXR   -duonebs prn  -CXR improved from repeat yesterday      Heme  -H/H-->9.6/28.7  -INR 1.5  -q4 CBCs, transfuse products as necessary     ID  -AF  -f/u cultures   -Abx: Cipro, fluconazole and linezolid      MSK  -OOBTC this AM     FEN/GI  -replace lytes prn  -CLD  - TPN @ 60  -s/p ex-lap 11/16 and 11/18   - CT abdomen/pelvis (non-contrast) yesterday appears to demonstrate stability      Endocrine  -insulin as needed  -accuchecks       -CRRT per renal     PPX:  -SQH, PPI     dispo  -continue ICU care           Critical care was time spent personally by me on the following activities: development of treatment plan with patient or surrogate and bedside caregivers, discussions with consultants, evaluation of patient's response to treatment, examination of patient, ordering and performing treatments  and interventions, ordering and review of laboratory studies, ordering and review of radiographic studies, pulse oximetry, re-evaluation of patient's condition.  This critical care time did not overlap with that of any other provider or involve time for any procedures.     Adolph Pavon MD  Critical Care - Surgery  [facility]

## 2018-11-20 NOTE — PROGRESS NOTES
[facility]  Liver Transplant  Progress Note    Patient Name: Femi Enciso  MRN: [id number]  Admission Date: 10/27/2018  Hospital Length of Stay: 24 days  Code Status: Full Code  Primary Care Provider: Primary Doctor No  Post-Op Day 2 Days Post-Op    ORGAN:   LIVER  Disease Etiology: Alcoholic Cirrhosis  Donor Type:    - Brain Death  CDC High Risk:   No  Donor CMV Status:   Donor CMV Status: Positive  Donor HBcAB:   Negative  Donor HCV Status:   Negative  Donor HBV JAVIER: Negative  Donor HCV JAVIER: Negative  Whole or Partial: Whole Liver  Biliary Anastomosis: End to End  Arterial Anatomy: Standard    Subjective:     No acute events overnight, remained off pressors, extubated to nasal cannula yesterday, passed swallow and tolerated clears. Having multiple bowel movements.     Scheduled Meds:   ciprofloxacin HCl  750 mg Oral Daily    famotidine  20 mg Oral QHS    fat emulsion  250 mL Intravenous Daily    heparin (porcine)  5,000 Units Subcutaneous Q8H    hydrocortisone sodium succinate  25 mg Intravenous Q8H    insulin aspart U-100  6-8 Units Subcutaneous TIDWM    isavuconazonium sulfate  372 mg Oral Daily    k phos di & mono-sod phos mono  2 tablet Oral TID    linezolid  600 mg Oral Q12H    tacrolimus  1 mg Oral BID    [START ON 2018] valGANciclovir  450 mg Oral Every Mon, Wed, Fri     Continuous Infusions:   sodium chloride 0.9% 200 mL/hr at 18 0600    sodium chloride 0.9%      insulin (HUMAN R) infusion (adults) 1.6 Units/hr (18 1100)    TPN ADULT CENTRAL LINE CUSTOM 60 mL/hr at 18 1100     PRN Meds:dextrose 50%, dextrose 50%, glucagon (human recombinant), glucose, glucose, HYDROmorphone, insulin aspart U-100, magnesium sulfate IVPB, magnesium sulfate IVPB, oxyCODONE, oxyCODONE, potassium phosphate IVPB, ramelteon    Review of Systems   Constitutional: Negative for activity change, appetite change, chills, fatigue and fever.   HENT: Negative for congestion,  ear pain, rhinorrhea and sore throat.    Eyes: Negative for pain, discharge and visual disturbance.   Respiratory: Negative for cough, chest tightness and shortness of breath.    Cardiovascular: Negative for chest pain and palpitations.   Gastrointestinal: Negative for abdominal distention, abdominal pain, blood in stool, constipation, diarrhea, nausea and vomiting.   Genitourinary: Negative for difficulty urinating.   Musculoskeletal: Negative for back pain and neck pain.   Skin: Negative for color change and rash.   Neurological: Negative for dizziness, numbness and headaches.   Hematological: Negative for adenopathy.   Psychiatric/Behavioral: Negative.    All other systems reviewed and are negative.    Objective:     Vital Signs (Most Recent):  Temp: 99 °F (37.2 °C) (11/20/18 1100)  Pulse: 92 (11/20/18 1330)  Resp: (!) 27 (11/20/18 1330)  BP: 109/72 (11/20/18 1230)  SpO2: 100 % (11/20/18 1330) Vital Signs (24h Range):  Temp:  [98.1 °F (36.7 °C)-99.7 °F (37.6 °C)] 99 °F (37.2 °C)  Pulse:  [] 92  Resp:  [11-35] 27  SpO2:  [97 %-100 %] 100 %  BP: ()/(59-76) 109/72  Arterial Line BP: ()/(47-73) 134/64     Weight: 98.1 kg (216 lb 4.3 oz)  Body mass index is 31.03 kg/m².    Intake/Output - Last 3 Shifts       11/18 0700 - 11/19 0659 11/19 0700 - 11/20 0659 11/20 0700 - 11/21 0659    P.O.  560     I.V. (mL/kg) 5815 (59.3) 6560.1 (66.9)     Blood 286      NG/GT 55      IV Piggyback  1000     TPN 1473 2203.8     Total Intake(mL/kg) 7629 (77.8) 57666.9 (105.2)     Urine (mL/kg/hr) 7 (0) 5 (0)     Drains 1460 490 190    Other 6464 4333     Stool  0     Total Output 7931 4828 190    Net -302 +5495.9 -190           Stool Occurrence  3 x           Physical Exam   Constitutional: He is oriented to person, place, and time. He appears well-developed.   jaundiced   HENT:   Head: Normocephalic and atraumatic.   Eyes: Scleral icterus is present.   Cardiovascular: Normal rate, regular rhythm and intact distal  pulses.   Pulmonary/Chest: Effort normal. No respiratory distress.   Nasal canula    Abdominal: Soft. He exhibits no distension.   Right sided JOSE A drain with dark serosanguinous output  Incision with clean bandage in place   Musculoskeletal: He exhibits edema.   Neurological: He is alert and oriented to person, place, and time.   Skin: Skin is warm and dry.   Jaundice improving       Laboratory:  Immunosuppressants         Stop Route Frequency     tacrolimus capsule 1 mg      -- Oral 2 times daily        Labs within the past 24 hours have been reviewed.    Diagnostic Results:  I have personally reviewed all pertinent imaging studies.    Assessment/Plan:   Femi Enciso is a 53 y.o. male     * S/P liver transplant      53 year old male with history of ESLD 2/2 ETOH cirrhosis who is s/p liver transplant c/b take-back x 3 for bleeding most recently 11/18    Neuro  -acute change in mental status with confusion and agitation on 11/14, controlled with PRN ativan   -monitor neuro exam when sedation weaned, currently sedated with propofol - alert and oriented, responds appropriately off sedation    CV  -remained off pressors for last 48hrs, continue to monitor and leave off as tolerated  -swan tricia catheter removed  -monitor on telemetry     Resp  -Extubated, on nasal canula O2, wean as tolerated  -Oxygen Concentration (%):  [21] 21  -ABGs & CXR prn  -continue to monitor O2 sats   -CXR stable      Heme  -H/H stable overnight   -INR 1.5 from 1.3, continue to monitor   -daily CBCs, transfuse as needed     ID  -monitor fever and WBC, WBC increased to 29 from 17 today   -f/u cultures: enterococcus faecium - susceptibilities pending   -Abx: Cipro, fluconazole and linezolid     MSK  -PT/OT  -Encourage OOBTC    FEN/GI  -replace lytes   -Renal diet  -s/p ex-lap 11/16 requiring take-back for bleeding, left open, now s/p closure 11/18  - TPN to run out tonight     Endocrine  -insulin as needed. Monitor BG       -CRRT per renal, would  like to increase UF of CRRT today     dispo  -stepdown to TSU today when bed available         The patients clinical status was discussed at multidisplinary rounds, involving transplant surgery, transplant medicine, pharmacy, nursing, nutrition, and social work.    Crys Flores MD  Liver Transplant  [facility]

## 2018-11-20 NOTE — PROGRESS NOTES
[facility]  Nephrology  Progress Note    Patient Name: Femi Enciso  MRN: [id number]  Admission Date: 10/27/2018  Hospital Length of Stay: 24 days  Attending Provider: Femi Patel MD   Primary Care Physician: Primary Doctor No  Principal Problem:S/P liver transplant    Subjective:     HPI: 54 y/o man with DM2 presents to the ED with family for liver failure (likely due to EtOH abundant history of drinking - diagnosed in Sept 2018 in [place]).  He reports jaundice, generalized weakness, nausea, diarrhea, and decreased appetite since Sept 2018.  He is from [place], [place], and Dr. Sharma (patients physician) recommended bringing him to hospital for evaluation.  Patient denies any fever, chills, vomiting, chest pain, palpitations, SOB, abdominal pain.      Nephrology consulted for evaluation/management Adri.     Interval History: on SLED since yesterday morning, stable hemodynamics, stilll net positive by close to 5L, received 1000ccs worth of albumin yesterday and also piggy backs, in no distress or complaints    Review of patient's allergies indicates:   Allergen Reactions    Cefepime Rash    Penicillins Nausea And Vomiting and Rash     Full body rash from cefepime as well     Current Facility-Administered Medications   Medication Frequency    0.9%  NaCl infusion (CRRT USE ONLY) Continuous    ciprofloxacin HCl tablet 750 mg Daily    dextrose 50% injection 12.5 g PRN    dextrose 50% injection 25 g PRN    famotidine tablet 20 mg QHS    fat emulsion 20 % infusion 250 mL Daily    glucagon (human recombinant) injection 1 mg PRN    glucose chewable tablet 16 g PRN    glucose chewable tablet 24 g PRN    heparin (porcine) injection 5,000 Units Q8H    hydrocortisone sodium succinate injection 25 mg Q8H    HYDROmorphone injection 0.2 mg Q3H PRN    insulin aspart U-100 pen 0-10 Units PRN    insulin aspart U-100 pen 6-8 Units TIDWM    insulin regular (Humulin R) 100 Units in sodium chloride 0.9% 100  mL infusion Continuous    isavuconazonium sulfate Cap 372 mg Daily    k phos di & mono-sod phos mono 250 mg tablet 2 tablet TID    linezolid tablet 600 mg Q12H    magnesium sulfate 2g in water 50mL IVPB (premix) PRN    oxyCODONE immediate release tablet 5 mg Q4H PRN    oxyCODONE immediate release tablet Tab 10 mg Q4H PRN    potassium phosphate 15 mmol in dextrose 5 % 250 mL infusion BID PRN    ramelteon tablet 8 mg Nightly PRN    tacrolimus capsule 1 mg BID    TPN ADULT CENTRAL LINE CUSTOM Continuous    [START ON 11/21/2018] valGANciclovir tablet 450 mg Every Mon, Wed, Fri       Objective:     Vital Signs (Most Recent):  Temp: 99 °F (37.2 °C) (11/20/18 1100)  Pulse: 92 (11/20/18 1330)  Resp: (!) 27 (11/20/18 1330)  BP: 109/72 (11/20/18 1230)  SpO2: 100 % (11/20/18 1330)  O2 Device (Oxygen Therapy): nasal cannula (11/20/18 1227) Vital Signs (24h Range):  Temp:  [98.1 °F (36.7 °C)-99.7 °F (37.6 °C)] 99 °F (37.2 °C)  Pulse:  [] 92  Resp:  [11-35] 27  SpO2:  [97 %-100 %] 100 %  BP: ()/(59-76) 109/72  Arterial Line BP: ()/(47-73) 134/64     Weight: 98.1 kg (216 lb 4.3 oz) (11/15/18 0500)  Body mass index is 31.03 kg/m².  Body surface area is 2.2 meters squared.    I/O last 3 completed shifts:  In: 30798.9 [P.O.:560; I.V.:7250.1; IV Piggyback:1000]  Out: 8364 [Urine:5; Drains:1590; Other:6769]    Physical Exam   Constitutional: He is oriented to person, place, and time. He appears well-developed.   HENT:   Head: Normocephalic and atraumatic.   Eyes: EOM are normal.   Neck: No JVD present.   Cardiovascular: Normal rate and regular rhythm. Exam reveals no friction rub.   Pulmonary/Chest: Effort normal and breath sounds normal.   Abdominal: Soft. He exhibits distension.   Musculoskeletal: He exhibits edema.   Neurological: He is alert and oriented to person, place, and time.   Skin: Skin is warm.   Psychiatric: He has a normal mood and affect.           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   - s/p OHLTx 11/11/2018; intra-op SLED  - remaining anuric, but volume status and clearance number seem stable/ok on clinical exam, however net positive by almost 5L per recorded Is & Os and would be concerned he may become volume overloaded very quickly if this continues    Plan:  -continue SLED, mostly for volume and as tolerated by hemodynamics, run on machine until tomorrow morning and reassess           Thank you for your consult. I will follow-up with patient. Please contact us if you have any additional questions.    Petar Hoyos MD  Nephrology  [facility]

## 2018-11-20 NOTE — NURSING
"Dx: Liver Tx    Shift Events: extubated, boggs removed, albumin given, crrt continue    Neuro: AAO x4, Moves all extremities and Follows commands    Vital Signs: BP 99/64   Pulse 97   Temp 98.1 °F (36.7 °C) (Oral)   Resp 20   Ht 5' 10" (1.778 m)   Wt 98.1 kg (216 lb 4.3 oz)   SpO2 99%   BMI 31.03 kg/m²     Intake/Gtts/Diet: tpn:60 ml, insulin 1.4 cc     Output: crrt in use     Pain Management:: oxycodone 10 mg     CRRT: UF of 200        Labs: q4 labs    Skin: bruises, skin tear( back,left side) heals,elbows, heels intact     "

## 2018-11-20 NOTE — CONSULTS
Wound care consult received from nursing for skin breakdown to sacrum. Upon assessment of sacrum with nurse nonblanchable redness noted to sacrum. Pt has been having multiple loose stools and is being tested for c-diff. Recommended moisture barrier to prevent skin breakdown. Pt had bandages noted to creases to bilateral lower legs, in which nurse is following skin tear protocol appropriately. Wound care team will sign off. Please contact wound care team for further needs.

## 2018-11-21 LAB
ABO + RH BLD: NORMAL
ALBUMIN SERPL BCP-MCNC: 1.7 G/DL
ALBUMIN SERPL BCP-MCNC: 1.8 G/DL
ALBUMIN SERPL BCP-MCNC: 1.9 G/DL
ALLENS TEST: ABNORMAL
ALLENS TEST: ABNORMAL
ALP SERPL-CCNC: 230 U/L
ALP SERPL-CCNC: 233 U/L
ALT SERPL W/O P-5'-P-CCNC: 54 U/L
ALT SERPL W/O P-5'-P-CCNC: 54 U/L
ANION GAP SERPL CALC-SCNC: 6 MMOL/L
ANION GAP SERPL CALC-SCNC: 6 MMOL/L
ANION GAP SERPL CALC-SCNC: 7 MMOL/L
ANION GAP SERPL CALC-SCNC: 8 MMOL/L
ANION GAP SERPL CALC-SCNC: 9 MMOL/L
ANISOCYTOSIS BLD QL SMEAR: SLIGHT
ANISOCYTOSIS BLD QL SMEAR: SLIGHT
APTT BLDCRRT: 31.3 SEC
APTT BLDCRRT: 38.4 SEC
AST SERPL-CCNC: 29 U/L
AST SERPL-CCNC: 32 U/L
BACTERIA SPEC ANAEROBE CULT: NORMAL
BACTERIA SPEC ANAEROBE CULT: NORMAL
BASO STIPL BLD QL SMEAR: ABNORMAL
BASOPHILS # BLD AUTO: ABNORMAL K/UL
BASOPHILS NFR BLD: 0 %
BASOPHILS NFR BLD: 1.5 %
BILIRUB DIRECT SERPL-MCNC: 10.2 MG/DL
BILIRUB DIRECT SERPL-MCNC: 9.6 MG/DL
BILIRUB SERPL-MCNC: 13 MG/DL
BILIRUB SERPL-MCNC: 13.4 MG/DL
BLD GP AB SCN CELLS X3 SERPL QL: NORMAL
BLD PROD TYP BPU: NORMAL
BLOOD UNIT EXPIRATION DATE: NORMAL
BLOOD UNIT TYPE CODE: 5100
BLOOD UNIT TYPE: NORMAL
BUN SERPL-MCNC: 11 MG/DL
BUN SERPL-MCNC: 13 MG/DL
BUN SERPL-MCNC: 21 MG/DL
CALCIUM SERPL-MCNC: 7.4 MG/DL
CALCIUM SERPL-MCNC: 7.4 MG/DL
CALCIUM SERPL-MCNC: 7.6 MG/DL
CALCIUM SERPL-MCNC: 7.7 MG/DL
CALCIUM SERPL-MCNC: 7.8 MG/DL
CHLORIDE SERPL-SCNC: 106 MMOL/L
CHLORIDE SERPL-SCNC: 107 MMOL/L
CHLORIDE SERPL-SCNC: 108 MMOL/L
CHLORIDE SERPL-SCNC: 111 MMOL/L
CHLORIDE SERPL-SCNC: 111 MMOL/L
CK SERPL-CCNC: 17 U/L
CO2 SERPL-SCNC: 23 MMOL/L
CO2 SERPL-SCNC: 24 MMOL/L
CODING SYSTEM: NORMAL
CREAT SERPL-MCNC: 0.7 MG/DL
CREAT SERPL-MCNC: 0.8 MG/DL
CREAT SERPL-MCNC: 1.1 MG/DL
DELSYS: ABNORMAL
DELSYS: ABNORMAL
DIFFERENTIAL METHOD: ABNORMAL
DIFFERENTIAL METHOD: ABNORMAL
DISPENSE STATUS: NORMAL
DOHLE BOD BLD QL SMEAR: PRESENT
EOSINOPHIL # BLD AUTO: ABNORMAL K/UL
EOSINOPHIL NFR BLD: 10.5 %
EOSINOPHIL NFR BLD: 9 %
ERYTHROCYTE [DISTWIDTH] IN BLOOD BY AUTOMATED COUNT: 18.1 %
ERYTHROCYTE [DISTWIDTH] IN BLOOD BY AUTOMATED COUNT: 18.4 %
ERYTHROCYTE [SEDIMENTATION RATE] IN BLOOD BY WESTERGREN METHOD: 20 MM/H
EST. GFR  (AFRICAN AMERICAN): >60 ML/MIN/1.73 M^2
EST. GFR  (NON AFRICAN AMERICAN): >60 ML/MIN/1.73 M^2
FLOW: 2
GGT SERPL-CCNC: 344 U/L
GGT SERPL-CCNC: 358 U/L
GLUCOSE SERPL-MCNC: 106 MG/DL
GLUCOSE SERPL-MCNC: 134 MG/DL
GLUCOSE SERPL-MCNC: 134 MG/DL
GLUCOSE SERPL-MCNC: 136 MG/DL
GLUCOSE SERPL-MCNC: 91 MG/DL
HCO3 UR-SCNC: 26.4 MMOL/L (ref 24–28)
HCO3 UR-SCNC: 26.7 MMOL/L (ref 24–28)
HCT VFR BLD AUTO: 29.4 %
HCT VFR BLD AUTO: 30.6 %
HGB BLD-MCNC: 10 G/DL
HGB BLD-MCNC: 9.7 G/DL
HYPOCHROMIA BLD QL SMEAR: ABNORMAL
IMM GRANULOCYTES # BLD AUTO: ABNORMAL K/UL
IMM GRANULOCYTES # BLD AUTO: ABNORMAL K/UL
IMM GRANULOCYTES NFR BLD AUTO: ABNORMAL %
IMM GRANULOCYTES NFR BLD AUTO: ABNORMAL %
INR PPP: 1.3
INR PPP: 1.3
LYMPHOCYTES # BLD AUTO: ABNORMAL K/UL
LYMPHOCYTES NFR BLD: 3 %
LYMPHOCYTES NFR BLD: 3 %
MAGNESIUM SERPL-MCNC: 1.6 MG/DL
MAGNESIUM SERPL-MCNC: 1.6 MG/DL
MAGNESIUM SERPL-MCNC: 1.7 MG/DL
MAGNESIUM SERPL-MCNC: 1.9 MG/DL
MAGNESIUM SERPL-MCNC: 1.9 MG/DL
MAGNESIUM SERPL-MCNC: 2.1 MG/DL
MCH RBC QN AUTO: 29.5 PG
MCH RBC QN AUTO: 29.9 PG
MCHC RBC AUTO-ENTMCNC: 32.7 G/DL
MCHC RBC AUTO-ENTMCNC: 33 G/DL
MCV RBC AUTO: 89 FL
MCV RBC AUTO: 91 FL
METAMYELOCYTES NFR BLD MANUAL: 0.5 %
MODE: ABNORMAL
MODE: ABNORMAL
MONOCYTES # BLD AUTO: ABNORMAL K/UL
MONOCYTES NFR BLD: 4 %
MONOCYTES NFR BLD: 6 %
MYELOCYTES NFR BLD MANUAL: 0.5 %
NEUTROPHILS NFR BLD: 79 %
NEUTROPHILS NFR BLD: 79 %
NEUTS BAND NFR BLD MANUAL: 1 %
NEUTS BAND NFR BLD MANUAL: 3 %
NRBC BLD-RTO: 0 /100 WBC
NRBC BLD-RTO: 0 /100 WBC
NUM UNITS TRANS PACKED RBC: NORMAL
OVALOCYTES BLD QL SMEAR: ABNORMAL
PCO2 BLDA: 36.3 MMHG (ref 35–45)
PCO2 BLDA: 55.3 MMHG (ref 35–45)
PH SMN: 7.29 [PH] (ref 7.35–7.45)
PH SMN: 7.47 [PH] (ref 7.35–7.45)
PHOSPHATE SERPL-MCNC: 2.5 MG/DL
PHOSPHATE SERPL-MCNC: 3 MG/DL
PHOSPHATE SERPL-MCNC: 3.1 MG/DL
PHOSPHATE SERPL-MCNC: 3.1 MG/DL
PHOSPHATE SERPL-MCNC: 4.3 MG/DL
PLATELET # BLD AUTO: 50 K/UL
PLATELET # BLD AUTO: 60 K/UL
PLATELET BLD QL SMEAR: ABNORMAL
PMV BLD AUTO: 13.9 FL
PMV BLD AUTO: ABNORMAL FL
PO2 BLDA: 159 MMHG (ref 80–100)
PO2 BLDA: 93 MMHG (ref 80–100)
POC BE: 0 MMOL/L
POC BE: 3 MMOL/L
POC SATURATED O2: 98 % (ref 95–100)
POC SATURATED O2: 99 % (ref 95–100)
POC TCO2: 27 MMOL/L (ref 23–27)
POC TCO2: 28 MMOL/L (ref 23–27)
POCT GLUCOSE: 105 MG/DL (ref 70–110)
POCT GLUCOSE: 112 MG/DL (ref 70–110)
POCT GLUCOSE: 114 MG/DL (ref 70–110)
POCT GLUCOSE: 122 MG/DL (ref 70–110)
POCT GLUCOSE: 125 MG/DL (ref 70–110)
POCT GLUCOSE: 136 MG/DL (ref 70–110)
POCT GLUCOSE: 142 MG/DL (ref 70–110)
POCT GLUCOSE: 166 MG/DL (ref 70–110)
POCT GLUCOSE: 94 MG/DL (ref 70–110)
POIKILOCYTOSIS BLD QL SMEAR: SLIGHT
POLYCHROMASIA BLD QL SMEAR: ABNORMAL
POLYCHROMASIA BLD QL SMEAR: ABNORMAL
POTASSIUM SERPL-SCNC: 4.4 MMOL/L
POTASSIUM SERPL-SCNC: 4.7 MMOL/L
POTASSIUM SERPL-SCNC: 4.9 MMOL/L
POTASSIUM SERPL-SCNC: 4.9 MMOL/L
PROT SERPL-MCNC: 3.7 G/DL
PROT SERPL-MCNC: 3.8 G/DL
PROTHROMBIN TIME: 12.8 SEC
PROTHROMBIN TIME: 12.9 SEC
RBC # BLD AUTO: 3.29 M/UL
RBC # BLD AUTO: 3.35 M/UL
SAMPLE: ABNORMAL
SAMPLE: ABNORMAL
SITE: ABNORMAL
SITE: ABNORMAL
SODIUM SERPL-SCNC: 137 MMOL/L
SODIUM SERPL-SCNC: 139 MMOL/L
SODIUM SERPL-SCNC: 140 MMOL/L
SP02: 100
TACROLIMUS BLD-MCNC: 4.4 NG/ML
WBC # BLD AUTO: 34.15 K/UL
WBC # BLD AUTO: 35.02 K/UL

## 2018-11-21 PROCEDURE — 99232 PR SUBSEQUENT HOSPITAL CARE,LEVL II: ICD-10-PCS | Mod: ,,, | Performed by: INTERNAL MEDICINE

## 2018-11-21 PROCEDURE — 85610 PROTHROMBIN TIME: CPT

## 2018-11-21 PROCEDURE — 25000003 PHARM REV CODE 250: Performed by: STUDENT IN AN ORGANIZED HEALTH CARE EDUCATION/TRAINING PROGRAM

## 2018-11-21 PROCEDURE — 63600175 PHARM REV CODE 636 W HCPCS: Performed by: SURGERY

## 2018-11-21 PROCEDURE — 85027 COMPLETE CBC AUTOMATED: CPT

## 2018-11-21 PROCEDURE — 97168 OT RE-EVAL EST PLAN CARE: CPT

## 2018-11-21 PROCEDURE — 82248 BILIRUBIN DIRECT: CPT

## 2018-11-21 PROCEDURE — 97535 SELF CARE MNGMENT TRAINING: CPT

## 2018-11-21 PROCEDURE — 82977 ASSAY OF GGT: CPT

## 2018-11-21 PROCEDURE — 63600175 PHARM REV CODE 636 W HCPCS: Performed by: STUDENT IN AN ORGANIZED HEALTH CARE EDUCATION/TRAINING PROGRAM

## 2018-11-21 PROCEDURE — 80053 COMPREHEN METABOLIC PANEL: CPT

## 2018-11-21 PROCEDURE — 82550 ASSAY OF CK (CPK): CPT

## 2018-11-21 PROCEDURE — 99233 SBSQ HOSP IP/OBS HIGH 50: CPT | Mod: 24,,, | Performed by: SURGERY

## 2018-11-21 PROCEDURE — 90945 DIALYSIS ONE EVALUATION: CPT

## 2018-11-21 PROCEDURE — 99232 SBSQ HOSP IP/OBS MODERATE 35: CPT | Mod: ,,, | Performed by: NURSE PRACTITIONER

## 2018-11-21 PROCEDURE — 87040 BLOOD CULTURE FOR BACTERIA: CPT | Mod: 59

## 2018-11-21 PROCEDURE — 99223 1ST HOSP IP/OBS HIGH 75: CPT | Mod: ,,, | Performed by: PSYCHIATRY & NEUROLOGY

## 2018-11-21 PROCEDURE — 99900035 HC TECH TIME PER 15 MIN (STAT)

## 2018-11-21 PROCEDURE — 63600175 PHARM REV CODE 636 W HCPCS: Performed by: TRANSPLANT SURGERY

## 2018-11-21 PROCEDURE — 82803 BLOOD GASES ANY COMBINATION: CPT

## 2018-11-21 PROCEDURE — 63600175 PHARM REV CODE 636 W HCPCS: Mod: JG | Performed by: INTERNAL MEDICINE

## 2018-11-21 PROCEDURE — 25000003 PHARM REV CODE 250: Performed by: SURGERY

## 2018-11-21 PROCEDURE — 99232 PR SUBSEQUENT HOSPITAL CARE,LEVL II: ICD-10-PCS | Mod: ,,, | Performed by: NURSE PRACTITIONER

## 2018-11-21 PROCEDURE — 80069 RENAL FUNCTION PANEL: CPT

## 2018-11-21 PROCEDURE — 99232 SBSQ HOSP IP/OBS MODERATE 35: CPT | Mod: ,,, | Performed by: INTERNAL MEDICINE

## 2018-11-21 PROCEDURE — 37799 UNLISTED PX VASCULAR SURGERY: CPT

## 2018-11-21 PROCEDURE — 99223 PR INITIAL HOSPITAL CARE,LEVL III: ICD-10-PCS | Mod: ,,, | Performed by: PSYCHIATRY & NEUROLOGY

## 2018-11-21 PROCEDURE — 85007 BL SMEAR W/DIFF WBC COUNT: CPT

## 2018-11-21 PROCEDURE — 99233 PR SUBSEQUENT HOSPITAL CARE,LEVL III: ICD-10-PCS | Mod: ,,, | Performed by: INTERNAL MEDICINE

## 2018-11-21 PROCEDURE — 99233 PR SUBSEQUENT HOSPITAL CARE,LEVL III: ICD-10-PCS | Mod: 24,,, | Performed by: SURGERY

## 2018-11-21 PROCEDURE — 25500020 PHARM REV CODE 255: Performed by: TRANSPLANT SURGERY

## 2018-11-21 PROCEDURE — 25000003 PHARM REV CODE 250: Performed by: HOSPITALIST

## 2018-11-21 PROCEDURE — 80100008 HC CRRT DAILY MAINTENANCE

## 2018-11-21 PROCEDURE — 20000000 HC ICU ROOM

## 2018-11-21 PROCEDURE — 99233 SBSQ HOSP IP/OBS HIGH 50: CPT | Mod: ,,, | Performed by: INTERNAL MEDICINE

## 2018-11-21 PROCEDURE — 97530 THERAPEUTIC ACTIVITIES: CPT

## 2018-11-21 PROCEDURE — 94761 N-INVAS EAR/PLS OXIMETRY MLT: CPT

## 2018-11-21 PROCEDURE — 25000003 PHARM REV CODE 250: Performed by: INTERNAL MEDICINE

## 2018-11-21 PROCEDURE — 97164 PT RE-EVAL EST PLAN CARE: CPT

## 2018-11-21 PROCEDURE — 80069 RENAL FUNCTION PANEL: CPT | Mod: 91

## 2018-11-21 PROCEDURE — 83735 ASSAY OF MAGNESIUM: CPT

## 2018-11-21 PROCEDURE — 85730 THROMBOPLASTIN TIME PARTIAL: CPT

## 2018-11-21 RX ORDER — MAGNESIUM SULFATE HEPTAHYDRATE 40 MG/ML
2 INJECTION, SOLUTION INTRAVENOUS
Status: DISCONTINUED | OUTPATIENT
Start: 2018-11-21 | End: 2018-11-21

## 2018-11-21 RX ORDER — HYDROCODONE BITARTRATE AND ACETAMINOPHEN 500; 5 MG/1; MG/1
TABLET ORAL CONTINUOUS
Status: ACTIVE | OUTPATIENT
Start: 2018-11-21 | End: 2018-11-23

## 2018-11-21 RX ORDER — IBUPROFEN 200 MG
24 TABLET ORAL
Status: DISCONTINUED | OUTPATIENT
Start: 2018-11-21 | End: 2018-11-23

## 2018-11-21 RX ORDER — GLUCAGON 1 MG
1 KIT INJECTION
Status: DISCONTINUED | OUTPATIENT
Start: 2018-11-21 | End: 2018-11-23

## 2018-11-21 RX ORDER — CEFEPIME HYDROCHLORIDE 1 G/1
1 INJECTION, POWDER, FOR SOLUTION INTRAMUSCULAR; INTRAVENOUS
Status: DISCONTINUED | OUTPATIENT
Start: 2018-11-21 | End: 2018-11-29

## 2018-11-21 RX ORDER — HYDROCODONE BITARTRATE AND ACETAMINOPHEN 500; 5 MG/1; MG/1
TABLET ORAL CONTINUOUS
Status: DISCONTINUED | OUTPATIENT
Start: 2018-11-21 | End: 2018-11-21

## 2018-11-21 RX ORDER — VALGANCICLOVIR 450 MG/1
450 TABLET, FILM COATED ORAL DAILY
Status: DISCONTINUED | OUTPATIENT
Start: 2018-11-22 | End: 2018-11-22

## 2018-11-21 RX ORDER — IBUPROFEN 200 MG
16 TABLET ORAL
Status: DISCONTINUED | OUTPATIENT
Start: 2018-11-21 | End: 2018-11-23

## 2018-11-21 RX ORDER — ONDANSETRON 2 MG/ML
4 INJECTION INTRAMUSCULAR; INTRAVENOUS ONCE
Status: COMPLETED | OUTPATIENT
Start: 2018-11-21 | End: 2018-11-21

## 2018-11-21 RX ORDER — INSULIN ASPART 100 [IU]/ML
2-4 INJECTION, SOLUTION INTRAVENOUS; SUBCUTANEOUS
Status: DISCONTINUED | OUTPATIENT
Start: 2018-11-21 | End: 2018-11-21

## 2018-11-21 RX ORDER — INSULIN ASPART 100 [IU]/ML
1-10 INJECTION, SOLUTION INTRAVENOUS; SUBCUTANEOUS
Status: DISCONTINUED | OUTPATIENT
Start: 2018-11-21 | End: 2018-11-23

## 2018-11-21 RX ORDER — METRONIDAZOLE 500 MG/1
500 TABLET ORAL EVERY 8 HOURS
Status: DISCONTINUED | OUTPATIENT
Start: 2018-11-21 | End: 2018-11-22

## 2018-11-21 RX ORDER — MAGNESIUM SULFATE HEPTAHYDRATE 40 MG/ML
2 INJECTION, SOLUTION INTRAVENOUS
Status: DISPENSED | OUTPATIENT
Start: 2018-11-21 | End: 2018-11-22

## 2018-11-21 RX ADMIN — TACROLIMUS 1 MG: 1 CAPSULE ORAL at 06:11

## 2018-11-21 RX ADMIN — ONDANSETRON 4 MG: 2 INJECTION INTRAMUSCULAR; INTRAVENOUS at 10:11

## 2018-11-21 RX ADMIN — TACROLIMUS 1 MG: 1 CAPSULE ORAL at 08:11

## 2018-11-21 RX ADMIN — HEPARIN SODIUM 5000 UNITS: 5000 INJECTION, SOLUTION INTRAVENOUS; SUBCUTANEOUS at 05:11

## 2018-11-21 RX ADMIN — LINEZOLID 600 MG: 600 TABLET, FILM COATED ORAL at 08:11

## 2018-11-21 RX ADMIN — DAPTOMYCIN 980 MG: 500 INJECTION, POWDER, LYOPHILIZED, FOR SOLUTION INTRAVENOUS at 01:11

## 2018-11-21 RX ADMIN — HYDROCORTISONE SODIUM SUCCINATE 25 MG: 100 INJECTION, POWDER, FOR SOLUTION INTRAMUSCULAR; INTRAVENOUS at 09:11

## 2018-11-21 RX ADMIN — CEFEPIME 1 G: 1 INJECTION, POWDER, FOR SOLUTION INTRAMUSCULAR; INTRAVENOUS at 10:11

## 2018-11-21 RX ADMIN — ISAVUCONAZONIUM SULFATE 372 MG: 186 CAPSULE ORAL at 08:11

## 2018-11-21 RX ADMIN — IOHEXOL 100 ML: 350 INJECTION, SOLUTION INTRAVENOUS at 12:11

## 2018-11-21 RX ADMIN — VALGANCICLOVIR 450 MG: 450 TABLET, FILM COATED ORAL at 08:11

## 2018-11-21 RX ADMIN — METRONIDAZOLE 500 MG: 500 TABLET ORAL at 06:11

## 2018-11-21 RX ADMIN — HEPARIN SODIUM 5000 UNITS: 5000 INJECTION, SOLUTION INTRAVENOUS; SUBCUTANEOUS at 01:11

## 2018-11-21 RX ADMIN — HYDROCORTISONE SODIUM SUCCINATE 25 MG: 100 INJECTION, POWDER, FOR SOLUTION INTRAMUSCULAR; INTRAVENOUS at 05:11

## 2018-11-21 RX ADMIN — HEPARIN SODIUM 5000 UNITS: 5000 INJECTION, SOLUTION INTRAVENOUS; SUBCUTANEOUS at 09:11

## 2018-11-21 RX ADMIN — DIBASIC SODIUM PHOSPHATE, MONOBASIC POTASSIUM PHOSPHATE AND MONOBASIC SODIUM PHOSPHATE 2 TABLET: 852; 155; 130 TABLET ORAL at 08:11

## 2018-11-21 RX ADMIN — CEFEPIME 1 G: 1 INJECTION, POWDER, FOR SOLUTION INTRAMUSCULAR; INTRAVENOUS at 09:11

## 2018-11-21 RX ADMIN — HYDROCORTISONE SODIUM SUCCINATE 25 MG: 100 INJECTION, POWDER, FOR SOLUTION INTRAMUSCULAR; INTRAVENOUS at 01:11

## 2018-11-21 RX ADMIN — Medication 0.2 MG: at 01:11

## 2018-11-21 NOTE — ASSESSMENT & PLAN NOTE
Avoid insulin stacking and hypoglycemia.  CRRT per nephrology  Lab Results   Component Value Date    CREATININE 0.8 11/21/2018    CREATININE 0.8 11/21/2018    CREATININE 0.8 11/21/2018

## 2018-11-21 NOTE — PROGRESS NOTES
[facility]  Critical Care - Surgery  Progress Note    Patient Name: Femi Enciso  MRN: [id number]  Admission Date: 10/27/2018  Hospital Length of Stay: 25 days  Code Status: Full Code  Attending Provider: Femi Patel MD  Primary Care Provider: Primary Doctor No   Principal Problem: S/P liver transplant    Subjective:     Hospital/ICU Course:  Interval History:  No acute events overnight.  BP low/stable on Midodrine 15 mg tid.    Patient w DEL for past 2 weeks, not a candidate for kidney transplant before 12/4/18.  He is listed for liver transplant w MELD of 42 today- T bili 37.5/Cr 7.2/INR 2.1.  He is having 3-4 BMs on Lactulose 15 mg every 6 hours.  Faint morbilliform rash on torso and bilateral upper and lower extremities noted- improving per patient.  Pruritis managed w Hydroxyzine.         No new subjective & objective note has been filed under this hospital service since the last note was generated.    Assessment/Plan:     * S/P liver transplant    Assessment/Plan:   Femi Enciso is a 53 y.o. male now s/p OLT         * S/P liver transplant        53 year old male with history of ESLD 2/2 ETOH cirrhosis who is s/p liver transplant 11/11/18     Neuro  -off sedation  -pain: dilaudid, oxycodone prn     CV  -HDS, off pressors  -given albumin 250 cc for HoTN overnight   -swan tricia catheter removed  -monitor on telemetry      Resp  -extubated. sats >95 on NC  -ABGs prn  -daily CXR   -duonebs prn  -CXR improved from repeat yesterday      Heme  -H/H  -INR      ID  -AF  -f/u cultures - VRE from peritoneal fluid  -Abx: Cefepime, flagyl and linezolid      MSK  -OOBTC this AM     FEN/GI  -replace lytes prn  -renal diet   - TPN @ 60  -s/p ex-lap 11/16 and 11/18   - CT abdomen/pelvis (non-contrast) 11/19 appears to demonstrate stability      Endocrine  -insulin as needed  -accuchecks       -CRRT per renal     PPX:  -SQH, PPI     dispo  -can stepdown when bed available               Critical care was time  spent personally by me on the following activities: development of treatment plan with patient or surrogate and bedside caregivers, discussions with consultants, evaluation of patient's response to treatment, examination of patient, ordering and performing treatments and interventions, ordering and review of laboratory studies, ordering and review of radiographic studies, pulse oximetry, re-evaluation of patient's condition.  This critical care time did not overlap with that of any other provider or involve time for any procedures.     Crys Flores MD  Liver Transplant  [facility]

## 2018-11-21 NOTE — SUBJECTIVE & OBJECTIVE
[facility]  Liver Transplant  Progress Note     Patient Name: Femi Enciso  MRN: [id number]  Admission Date: 10/27/2018  Hospital Length of Stay: 22 days  Code Status: Full Code  Primary Care Provider: Primary Doctor No  Post-Op Day 3 Days Post-Op        Subjective:      Hospital/ICU Course:  11/11: s/p liver transplant  11/12 - extubated, weaned off pressors; pulled out all 3 JOSE A drains  11/13 - CRRT held overnight; 1U Plt  11/14 -2u pRBC  11/15- 1u prbc  11/16- OR for bleeding, abthera + JOSE A x 1, 7 PRBC, 3 plt, 1 FFP  11/17- 2U pRBCs and RU plts   11/18- OR for ex lap, hemostasis achieved     Interval History/Significant Events:  NAEON. HDS, no pressors. On CRRT for 13 hours yesterday. Pt is AAO to person, place and time this AM. Pt tolerating renal diet. Pain well controlled this AM.     Follow-up For: Procedure(s) (LRB):  LAPAROTOMY, EXPLORATORY, AFTER LIVER TRANSPLANT, LIKELY  ABDOMINAL CLOSURE (N/A)    Post-Operative Day: 3 Days Post-Op    Scheduled Meds:   ciprofloxacin (CIPRO)400mg/200ml D5W IVPB  400 mg Intravenous Q12H    famotidine (PF)  20 mg Intravenous Daily    heparin (porcine)  5,000 Units Subcutaneous Q8H    hydrocortisone sodium succinate  50 mg Intravenous Q8H    custom IVPB builder   Intravenous Q8H     Followed by    [START ON 11/20/2018] custom IVPB builder   Intravenous Q24H    k phos di & mono-sod phos mono  2 tablet Oral TID    linezolid 600mg/300ml  600 mg Intravenous Q12H    sodium phosphate IVPB  30 mmol Intravenous Once    [START ON 11/21/2018] valganciclovir 50 mg/ml  450 mg Per NG tube Daily      Continuous Infusions:   sodium chloride 0.9% 200 mL/hr at 11/18/18 1115    insulin (HUMAN R) infusion (adults) 0.8 Units/hr (11/18/18 1200)    norepinephrine bitartrate-D5W Stopped (11/17/18 1100)    propofol 30 mcg/kg/min (11/18/18 1230)    TPN ADULT CENTRAL LINE CUSTOM 60 mL/hr at 11/18/18 1200    TPN ADULT CENTRAL LINE CUSTOM        PRN Meds:sodium chloride,  sodium chloride, sodium chloride, sodium chloride, sodium chloride, sodium chloride, dextrose 50%, dextrose 50%, fentaNYL, glucagon (human recombinant), glucose, glucose, insulin aspart U-100, magnesium sulfate IVPB, potassium phosphate IVPB, ramelteon       Objective:      Vital Signs (Most Recent):  Temp: 97.9 °F (36.6 °C) (11/18/18 1100)  Pulse: 79 (11/18/18 1215)  Resp: (!) 25 (11/18/18 0800)  BP: (!) 100/59 (11/18/18 1200)  SpO2: 100 % (11/18/18 1215) Vital Signs (24h Range):  Temp:  [96.8 °F (36 °C)-98.4 °F (36.9 °C)] 97.9 °F (36.6 °C)  Pulse:  [63-98] 79  Resp:  [10-33] 25  SpO2:  [97 %-100 %] 100 %  BP: ()/(59-80) 100/59  Arterial Line BP: (3-149)/(0-82) 97/50      Weight: 98.1 kg (216 lb 4.3 oz)  Body mass index is 31.03 kg/m².            Intake/Output - Last 3 Shifts        11/16 0700 - 11/17 0659 11/17 0700 - 11/18 0659 11/18 0700 - 11/19 0659     I.V. (mL/kg) 7797.6 (79.5) 8108.2 (82.7) 773 (7.9)     Blood 2456 1081       NG/GT   60       TPN   461       Total Intake(mL/kg) 75110.6 (104.5) 9710.2 (99) 773 (7.9)     Urine (mL/kg/hr)   10 (0) 0 (0)     Drains 120 305 160     Other 4223 7434 2851     Stool           Blood 100         Total Output 4443 7749 3011     Net +5810.6 +1961.2 -2238                         Physical Exam   Constitutional: He appears well-developed.   jaundiced   HENT:   Head: Normocephalic and atraumatic.   Eyes: Scleral icterus is present.   Cardiovascular: Normal rate, regular rhythm and intact distal pulses.   Pulmonary/Chest: Effort normal. No respiratory distress.   Sats > 95 on NC   Abdominal: Soft. He exhibits no distension.   Abdominal dressings with some serous oozing through dressing.   JOSE A drains in place with serous output.   Musculoskeletal: He exhibits edema.   Neurological:   AAOx3  Skin: Skin is warm and dry.   Jaundice improving         Laboratory:      Immunosuppressants      None          Labs within the past 24 hours have been reviewed.     Diagnostic  Results:  I have personally reviewed all pertinent imaging studies.

## 2018-11-21 NOTE — PROGRESS NOTES
[facility]  Nephrology  Progress Note    Patient Name: Femi Enciso  MRN: [id number]  Admission Date: 10/27/2018  Hospital Length of Stay: 25 days  Attending Provider: Femi Patel MD   Primary Care Physician: Primary Doctor No  Principal Problem:S/P liver transplant    Subjective:     HPI: 54 y/o man with DM2 presents to the ED with family for liver failure (likely due to EtOH abundant history of drinking - diagnosed in Sept 2018 in [place]).  He reports jaundice, generalized weakness, nausea, diarrhea, and decreased appetite since Sept 2018.  He is from [place], [place], and Dr. Sharma (patients physician) recommended bringing him to hospital for evaluation.  Patient denies any fever, chills, vomiting, chest pain, palpitations, SOB, abdominal pain.      Nephrology consulted for evaluation/management Adri.     Interval History: on SLED yesterday and overnight, volume status improved accounting for insensible losses +/- almost running even and clinically looks great    Review of patient's allergies indicates:   Allergen Reactions    Penicillins Nausea And Vomiting and Rash     Full body rash from cefepime as well     Current Facility-Administered Medications   Medication Frequency    0.9%  NaCl infusion (CRRT USE ONLY) Continuous    ceFEPIme injection 1 g Q12H    DAPTOmycin (CUBICIN) 980 mg in sodium chloride 0.9% IVPB Q24H    dextrose 50% injection 12.5 g PRN    dextrose 50% injection 25 g PRN    famotidine tablet 20 mg QHS    glucagon (human recombinant) injection 1 mg PRN    glucose chewable tablet 16 g PRN    glucose chewable tablet 24 g PRN    heparin (porcine) injection 5,000 Units Q8H    hydrocortisone sodium succinate injection 25 mg Q8H    HYDROmorphone injection 0.2 mg Q3H PRN    insulin aspart U-100 pen 1-10 Units QID (AC + HS) PRN    isavuconazonium sulfate Cap 372 mg Daily    k phos di & mono-sod phos mono 250 mg tablet 2 tablet TID    magnesium sulfate 2g in water 50mL IVPB  (premix) PRN    metroNIDAZOLE tablet 500 mg Q8H    oxyCODONE immediate release tablet 5 mg Q4H PRN    oxyCODONE immediate release tablet Tab 10 mg Q4H PRN    potassium phosphate 15 mmol in dextrose 5 % 250 mL infusion BID PRN    ramelteon tablet 8 mg Nightly PRN    tacrolimus capsule 1 mg BID    [START ON 11/22/2018] valGANciclovir tablet 450 mg Daily       Objective:     Vital Signs (Most Recent):  Temp: 98.1 °F (36.7 °C) (11/21/18 1045)  Pulse: 91 (11/21/18 0855)  Resp: (!) 25 (11/21/18 0855)  BP: 112/74 (11/21/18 0702)  SpO2: 99 % (11/21/18 0855)  O2 Device (Oxygen Therapy): room air (11/21/18 0855) Vital Signs (24h Range):  Temp:  [97.8 °F (36.6 °C)-99 °F (37.2 °C)] 98.1 °F (36.7 °C)  Pulse:  [] 91  Resp:  [11-33] 25  SpO2:  [97 %-100 %] 99 %  BP: ()/(63-80) 112/74  Arterial Line BP: ()/(48-72) 93/50     Weight: 98.1 kg (216 lb 4.3 oz) (11/15/18 0500)  Body mass index is 31.03 kg/m².  Body surface area is 2.2 meters squared.    I/O last 3 completed shifts:  In: 8151.3 [P.O.:500; I.V.:5810.1]  Out: 6297 [Drains:840; Other:5453; Stool:4]    Physical Exam   Constitutional: He is oriented to person, place, and time.   HENT:   Head: Atraumatic.   Neck: No JVD present.   Cardiovascular: Normal rate and regular rhythm. Exam reveals no friction rub.   Pulmonary/Chest: Effort normal and breath sounds normal.   Abdominal: Soft. He exhibits distension.   Musculoskeletal: He exhibits edema.   Neurological: He is alert and oriented to person, place, and time.   Skin: Skin is warm.   Psychiatric: He has a normal mood and affect.             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   - s/p OHLTx 11/11/2018; intra-op SLED  - remaining anuric,  clearance numbers look great, volume status improved and +/- even past 24hrs, also less input and looks like running overnight only we will be able to match Is & Os, also SCUF overnight should be adequate as he has been getting more or less continuous clearance over the past few days    Plan:  -SCUF overnight, goal to match Is & Os, reassess in am           Thank you for your consult. I will follow-up with patient. Please contact us if you have any additional questions.    Petar Hoyos MD  Nephrology  [facility]

## 2018-11-21 NOTE — ASSESSMENT & PLAN NOTE
BG goal 140-180      Discontinue IV insulin.   BG monitoring every ac/hs and moderate dose correction scale.       Discharge plans- TBD

## 2018-11-21 NOTE — NURSING
"Dx:  liver tx    Shift Events: multiple BMs,stool sample collected, crrt re-started, accuchecks performed    Neuro: AAO x4, Moves all extremities and Follows commands    Vital Signs: /78   Pulse 101   Temp 99 °F (37.2 °C) (Oral)   Resp (!) 31   Ht 5' 10" (1.778 m)   Wt 98.1 kg (216 lb 4.3 oz)   SpO2 99%   BMI 31.03 kg/m²     Intake/Gtts/Diet: renal diet, fluid restriction. Patient on the following gtts: insulin @1.6 units, tpn @ 60 ml, lipids @ 10.4 ml      Output: patient on crrt    Pain Management:: prn pain meds given       Labs: per standing order    Skin: skin tears bilateral calf and left lower back, elbows, heels and sacral area intact     "

## 2018-11-21 NOTE — CONSULTS
"Contact primary team(s) to elucidate reason for consult request. Original consult question "patient previously saw Dr. VANN pre-op, would like to be seen again."    Called SICU team, deny psychiatric concern at this time, report patient is being evaluated for possible stroke and is in CT presently.  Discussed consult with Liver transplant team, do not report concern for psychiatric evaluation at this time, defer to SICU team.     Please re-consult if we can participate in the care of this patient, thank you.     Ramandepe Gaston MD  PGY II Psychiatry   "

## 2018-11-21 NOTE — PLAN OF CARE
Problem: Occupational Therapy Goal  Goal: Occupational Therapy Goal  Goals to be met by: 12/5/18     Patient will increase functional independence with ADLs by performing:    UE Dressing with Supervision.  LE Dressing with Minimal Assistance.  Grooming while standing at sink with Stand-by Assistance.  Toileting from toilet with Minimal Assistance for hygiene and clothing management.   Toilet transfer to toilet with Stand-by Assistance.  Upper extremity  theraband exercise program x  15 reps  with independence.      Outcome: Ongoing (interventions implemented as appropriate)  OT re-eval completed, and above goals established. ISACC Blake  11/21/2018

## 2018-11-21 NOTE — PROGRESS NOTES
Temperature afebrile. Anuric.  denies pain .CRRT UF decreased from 350 mL/hr to 250 mL/hr to maintain MAP goal.  TPN/Lipids d/c. Insulin infusion decreased. HR NSR with intermittent ST prior to albumin admin. 250 mL of albumin administered for suspected hypovolemia. Pt currently appears to be asleep with call light within reach. Mother is at bedside and attentive to pt needs. Please refer to flow sheet documentation for complete assessment details.

## 2018-11-21 NOTE — ASSESSMENT & PLAN NOTE
54yo man w/a history of DM2 and alcohol dependence with associated hepatitis who was admitted on 10/27/2018 with acute liver failure (c/b PSE, HRS, hepatic hydrothorax) and ultimately underwent liver transplantation (s/p DBDLT 11/11/2018, CMV D+/R-, steroid induction, on maintenance tacro/MMF/pred; c/b post-operative delirium, VRE bacteruria, and IA hemorrhage requiring re-do ex-lap, RP/retrorenal/retrocolic hematoma evacuation, cauterization of RP bleed, partial right adrenalectomy, and temporary vac abdominal closure on 11/16/2018 and planned benign second look washout on 11/18/2018). Patient's bleeding has sabilized since cauterization but OR cultures have grown VRE.faecium confirming that this hematoma was infected. He has decompensated today with acute AMS concerning for CVA clinically vs toxic/metabolic induced delirium with an ongoing leukocytosis despite appropriate coverage per cultures.    - would change linezolid to daptomycin as isolate is sensitive and this agent will be better tolerated long term (no expected significant myelosuppression as with linezolid); weekly CPK needed and contact precautions  - would broaden GNR coverage to cefepime/flagyl instead of cipro empirically given persistent leukocytosis  - will repeat blood cultures given leukocytosis/AMS   - would continue posaconazole for mold prophylaxis per institutional protocol  - remainder of prophylaxis per protocol  - will f/u pending operative cultures and pending imaging studies to tailor therapy

## 2018-11-21 NOTE — PT/OT/SLP RE-EVAL
Physical Therapy Re-evaluation and treatment    Patient Name:  Femi Enciso   MRN:  [id number]    Recommendations:     Discharge Recommendations:  home health PT   Discharge Equipment Recommendations: (will determine DME needs closer to discharge)   Barriers to discharge: Decreased caregiver support family may not be able to assist pt at current functional level.     Assessment:     Femi Enciso is a 53 y.o. male admitted with a medical diagnosis of S/P liver transplant.  He presents with the following impairments/functional limitations:  weakness, gait instability, impaired balance, impaired endurance, impaired functional mobilty, decreased lower extremity function pt blanca treatment well and will benefit from skilled PT 4x/wk to progress physically to highest functional level. Pt should be able to discharge home with HHPT and DME to be determined closer to discharge. Pt was evaluated and discharged 10/30/18. Pt is s/p liver transplant 11/11, exp-lap 11/16, re-exploration 11/16/18.      SOCIAL: pt is retired and lives with his wife who works full-time. They live in 1 story with slab entrance. Pt owns no DME. Pt will have assistance from wife after work.  They will move in with Daughter in 1 story, slab.     Rehab Prognosis:  good; patient would benefit from acute skilled PT services to address these deficits and reach maximum level of function.      Recent Surgery: Procedure(s) (LRB):  LAPAROTOMY, EXPLORATORY, AFTER LIVER TRANSPLANT, LIKELY  ABDOMINAL CLOSURE (N/A) 3 Days Post-Op    Plan:     During this hospitalization, patient to be seen 4 x/week to address the above listed problems via gait training, therapeutic activities, therapeutic exercises  · Plan of Care Expires:  11/19/18   Plan of Care Reviewed with: patient, spouse, mother    Subjective     Communicated with nurse prior to session.  Patient found supine upon PT entry to room, agreeable to evaluation.      Chief Complaint: pt stated that he could not  stand.    Patient comments/goals: to get better and go home.   Pain/Comfort:  · Pain Rating 1: 0/10  · Pain Rating Post-Intervention 1: 0/10    Patients cultural, spiritual, Orthodox conflicts given the current situation: none      Objective:     Patient found with: telemetry, arterial line, blood pressure cuff, pulse ox (continuous), central line, JOSE A drain(R subclavian dialysis catheter)     General Precautions: Standard, fall   Orthopedic Precautions:N/A   Braces:       Exams:  · Cognitive Exam:  Patient is oriented to Person, Place, Time and Situation  · RLE ROM: WFL  · RLE Strength: 3/5 for major muscle groups  · LLE ROM: WFL  · LLE Strength: 3/5 for major muscle groups    Functional Mobility:  · Bed Mobility: pt needed verbal cues for hand placement and sequencing for functional mobility    · Rolling Right: maximal assistance  · Supine to Sit: maximal assistance  ·   · Transfers:     · Sit to Stand:  moderate assistance x 2 with no AD. Pt stood x 2 reps.    Balance: moderate assist static standing balance to brush teeth at bedside with OT. Pt blocked R knee with standing.   ·   · Gait: pt received gait training ~ 1 side step to head of bed with max assist.    AM-PAC 6 CLICK MOBILITY  Total Score:10         Patient left supine with all lines intact, call button in reach and mother and wife present. Pt put supine in bed to resume dialysis.     GOALS:   Multidisciplinary Problems     Physical Therapy Goals        Problem: Physical Therapy Goal    Goal Priority Disciplines Outcome Goal Variances Interventions   Physical Therapy Goal     PT, PT/OT Ongoing (interventions implemented as appropriate)     Description:  Goals to be met by:      Patient will increase functional independence with mobility by performin. Supine to sit with Modified [place] -not met  2. Sit to stand transfer with [place] -not met  3. Bed to chair transfer with independence using no AD -not met  4. Gait  x150 feet  with Supervision using LRAD. -not met  5. Lower extremity exercise program x20 reps per handout, with independence -not met                        History:     Past Medical History:   Diagnosis Date    Liver cirrhosis, alcoholic 09/30/2018       Past Surgical History:   Procedure Laterality Date    EGD (ESOPHAGOGASTRODUODENOSCOPY) N/A 11/6/2018    Performed by Duarte Jules MD at [facility] ENDO (2ND FLR)    ESOPHAGOGASTRODUODENOSCOPY N/A 11/6/2018    Procedure: EGD (ESOPHAGOGASTRODUODENOSCOPY);  Surgeon: Duarte Jules MD;  Location: [facility] (2ND FLR);  Service: Endoscopy;  Laterality: N/A;    EXPLORATORY LAPAROTOMY AFTER LIVER TRANSPLANTATION N/A 11/16/2018    Procedure: LAPAROTOMY, EXPLORATORY, AFTER LIVER TRANSPLANT;  Surgeon: Amadou Lester Jr., MD;  Location: [facility] OR [address] [place] [address];  Service: Transplant;  Laterality: N/A;    EXPLORATORY LAPAROTOMY AFTER LIVER TRANSPLANTATION N/A 11/18/2018    Procedure: LAPAROTOMY, EXPLORATORY, AFTER LIVER TRANSPLANT, LIKELY  ABDOMINAL CLOSURE;  Surgeon: Amadou Lester Jr., MD;  Location: [facility] OR [address] [place] [address];  Service: Transplant;  Laterality: N/A;    INSERTION OF TUNNELED CENTRAL VENOUS HEMODIALYSIS CATHETER N/A 11/7/2018    Procedure: INSERTION, CATHETER, CENTRAL VENOUS, HEMODIALYSIS, TUNNELED;  Surgeon: Brock Gonzalez MD;  Location: [facility] CATH LAB;  Service: Nephrology;  Laterality: N/A;    INSERTION, CATHETER, CENTRAL VENOUS, HEMODIALYSIS, TUNNELED N/A 11/7/2018    Performed by Brock Gonzalez MD at [facility] CATH LAB    LAPAROTOMY, EXPLORATORY N/A 11/16/2018    Procedure: LAPAROTOMY, EXPLORATORY;  Surgeon: Amadou Lester Jr., MD;  Location: [facility] OR [address] [place] [address];  Service: Transplant;  Laterality: N/A;    LAPAROTOMY, EXPLORATORY N/A 11/16/2018    Performed by Amadou Lester Jr., MD at [facility] OR [address] [place] [address]    LAPAROTOMY, EXPLORATORY, AFTER LIVER TRANSPLANT N/A 11/16/2018    Performed by Amadou Lester Jr., MD at [facility] OR [address] [place] [address]    LAPAROTOMY, EXPLORATORY, AFTER LIVER  TRANSPLANT, LIKELY  ABDOMINAL CLOSURE N/A 11/18/2018    Performed by Amadou Lester Jr., MD at [facility] OR [address] [place] [address]    LIVER TRANSPLANT N/A 11/11/2018    Procedure: TRANSPLANT, LIVER;  Surgeon: Shmuel Leary MD;  Location: [facility] OR [address] [place] [address];  Service: Transplant;  Laterality: N/A;    TRANSPLANT, LIVER N/A 11/11/2018    Performed by Shmuel Leary MD at [facility] OR [address] [place] [address]       Clinical Decision Making:     History  Co-morbidities and personal factors that may impact the plan of care Examination  Body Structures and Functions, activity limitations and participation restrictions that may impact the plan of care Clinical Presentation   Decision Making/ Complexity Score   Co-morbidities:   [] Time since onset of injury / illness / exacerbation  [] Status of current condition  []Patient's cognitive status and safety concerns    [] Multiple Medical Problems (see med hx)  Personal Factors:   [] Patient's age  [] Prior Level of function   [] Patient's home situation (environment and family support)  [] Patient's level of motivation  [] Expected progression of patient      HISTORY:(criteria)    [] 60868 - no personal factors/history    [] 21412 - has 1-2 personal factor/comorbidity     [] 28106 - has >3 personal factor/comorbidity     Body Regions:  [] Objective examination findings  [] Head     []  Neck  [] Trunk   [] Upper Extremity  [] Lower Extremity    Body Systems:  [] For communication ability, affect, cognition, language, and learning style: the assessment of the ability to make needs known, consciousness, orientation (person, place, and time), expected emotional /behavioral responses, and learning preferences (eg, learning barriers, education  needs)  [] For the neuromuscular system: a general assessment of gross coordinated movement (eg, balance, gait, locomotion, transfers, and transitions) and motor function  (motor control and motor learning)  [] For the musculoskeletal system: the assessment of  gross symmetry, gross range of motion, gross strength, height, and weight  [] For the integumentary system: the assessment of pliability(texture), presence of scar formation, skin color, and skin integrity  [] For cardiovascular/pulmonary system: the assessment of heart rate, respiratory rate, blood pressure, and edema     Activity limitations:    [] Patient's cognitive status and saf ety concerns          [] Status of current condition      [] Weight bearing restriction  [] Cardiopulmunary Restriction    Participation Restrictions:   [] Goals and goal agreement with the patient     [] Rehab potential (prognosis) and probable outcome      Examination of Body System: (criteria)    [] 89535 - addressing 1-2 elements    [] 98042 - addressing a total of 3 or more elements     [] 37350 -  Addressing a total of 4 or more elements         Clinical Presentation: (criteria)  Choose one     On examination of body system using standardized tests and measures patient presents with (CHOOSE ONE) elements from any of the following: body structures and functions, activity limitations, and/or participation restrictions.  Leading to a clinical presentation that is considered (CHOOSE ONE)                              Clinical Decision Making  (Eval Complexity):  Choose One     Time Tracking:     PT Received On: 11/21/18  PT Start Time: 0905     PT Stop Time: 0924  PT Total Time (min): 19 min     Billable Minutes: Re-eval 8 min and Therapeutic Activity 11 min      Nataliya Rushing, PT  11/21/2018
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
## 2018-11-21 NOTE — SUBJECTIVE & OBJECTIVE
"Interval HPI:   Overnight events:  Remains in ICU, awaiting stepdown bed. CRRT. BG at or below goal overnight; insulin infusion decreased from 1.6-1.1 u/hr per orders. Hydrocortisone 25 mg every 8 hours.   Eatin%  Nausea: No  Hypoglycemia and intervention: No  Fever: No  TPN and/or TF: No- discontinued overnight     /66 (BP Location: Left arm, Patient Position: Lying)   Pulse 90   Temp 98 °F (36.7 °C) (Oral)   Resp 16   Ht 5' 10" (1.778 m)   Wt 98.1 kg (216 lb 4.3 oz)   SpO2 97%   BMI 31.03 kg/m²     Labs Reviewed and Include    Recent Labs   Lab 18  0610   GLU 91  91  91   CALCIUM 7.6*  7.6*  7.6*   ALBUMIN 1.8*  1.8*  1.8*   PROT 3.7*     139  139   K 4.4  4.4  4.4   CO2 24  24  24     108  108   BUN 13  13  13   CREATININE 0.8  0.8  0.8   ALKPHOS 233*   ALT 54*   AST 29   BILITOT 13.4*     Lab Results   Component Value Date    WBC 35.02 (H) 2018    HGB 10.0 (L) 2018    HCT 30.6 (L) 2018    MCV 91 2018    PLT 50 (L) 2018     No results for input(s): TSH, FREET4 in the last 168 hours.  Lab Results   Component Value Date    HGBA1C 4.4 2018       Nutritional status:   Body mass index is 31.03 kg/m².  Lab Results   Component Value Date    ALBUMIN 1.8 (L) 2018    ALBUMIN 1.8 (L) 2018    ALBUMIN 1.8 (L) 2018     Lab Results   Component Value Date    PREALBUMIN 7 (L) 10/27/2018       Estimated Creatinine Clearance: 125.4 mL/min (based on SCr of 0.8 mg/dL).    Accu-Checks  Recent Labs     18  2126 18  0013 18  0400 18  0757 18  1312 18  1756 18  2158 18  2359 18  0103 18  0224   POCTGLUCOSE 236* 168* 168* 223* 219* 269* 166* 114* 122* 105       Current Medications and/or Treatments Impacting Glycemic Control  Immunotherapy:    Immunosuppressants         Stop Route Frequency     tacrolimus capsule 1 mg      -- Oral 2 times daily        Steroids:   Hormones " (From admission, onward)    Start     Stop Route Frequency Ordered    11/20/18 1400  hydrocortisone sodium succinate injection 25 mg      -- IV Every 8 hours 11/20/18 1205    11/09/18 1345  methylPREDNISolone sodium succinate injection 500 mg  (Med - Immunosuppression Induction Therapy (Methylprednisolone))      11/10 0144 IV Once pre-op 11/09/18 1236        Pressors:    Autonomic Drugs (From admission, onward)    None        Hyperglycemia/Diabetes Medications:   Antihyperglycemics (From admission, onward)    Start     Stop Route Frequency Ordered    11/20/18 1145  insulin aspart U-100 pen 6-8 Units      -- SubQ 3 times daily with meals 11/20/18 1039    11/18/18 0830  insulin regular (Humulin R) 100 Units in sodium chloride 0.9% 100 mL infusion      -- IV Continuous 11/18/18 0726    11/18/18 0826  insulin aspart U-100 pen 0-10 Units      -- SubQ As needed (PRN) 11/18/18 0726

## 2018-11-21 NOTE — SUBJECTIVE & OBJECTIVE
Patient History           Medical as of 12/5/2018     Past Medical History     Diagnosis Date Comments Source    Liver cirrhosis, alcoholic 09/30/2018 -- Provider                  Surgical as of 12/5/2018     Past Surgical History     Procedure Laterality Date Comments Source    ESOPHAGOGASTRODUODENOSCOPY N/A 11/6/2018 Procedure: EGD (ESOPHAGOGASTRODUODENOSCOPY);  Surgeon: Duarte Jules MD;  Location: [address]);  Service: Endoscopy;  Laterality: N/A; Provider    INSERTION OF TUNNELED CENTRAL VENOUS HEMODIALYSIS CATHETER N/A 11/7/2018 Procedure: INSERTION, CATHETER, CENTRAL VENOUS, HEMODIALYSIS, TUNNELED;  Surgeon: Brock Gonzalez MD;  Location: [facility] CATH LAB;  Service: Nephrology;  Laterality: N/A; Provider    LIVER TRANSPLANT N/A 11/11/2018 Procedure: TRANSPLANT, LIVER;  Surgeon: Shmuel Leary MD;  [facility];  Service: Transplant;  Laterality: N/A; Provider    LAPAROTOMY, EXPLORATORY N/A 11/16/2018 Procedure: LAPAROTOMY, EXPLORATORY;  Surgeon: Amadou Lester Jr., MD;  [facility];  Service: Transplant;  Laterality: N/A; Provider    EXPLORATORY LAPAROTOMY AFTER LIVER TRANSPLANTATION N/A 11/16/2018 Procedure: LAPAROTOMY, EXPLORATORY, AFTER LIVER TRANSPLANT;  Surgeon: Amadou Lester Jr., MD;  Location: [facility] OR [address] [place] [address];  Service: Transplant;  Laterality: N/A; Provider    EXPLORATORY LAPAROTOMY AFTER LIVER TRANSPLANTATION N/A 11/18/2018 Procedure: LAPAROTOMY, EXPLORATORY, AFTER LIVER TRANSPLANT, LIKELY  ABDOMINAL CLOSURE;  Surgeon: Amadou Lester Jr., MD;  Location: [address];  Service: Transplant;  Laterality: N/A; Provider                  Family as of 12/5/2018    None           Tobacco Use as of 12/5/2018     Smoking Status Smoking Start Date Smoking Quit Date Packs/Day Years Used    Never Smoker -- -- -- --    Types Comments Smokeless Tobacco Status Smokeless Tobacco Quit Date Source    -- -- Unknown -- Provider            Alcohol Use as of  12/5/2018     Alcohol Use Drinks/Week Alcohol/Week Comments Source    -- -- -- -- Provider    Frequency Standard Drinks Binge Drinking Source      -- -- -- Provider             Drug Use as of 12/5/2018     Drug Use Types Frequency Comments Source    -- -- -- -- Provider            Sexual Activity as of 12/5/2018     Sexually Active Birth Control Partners Comments Source    -- -- -- -- Provider            Activities of Daily Living as of 12/5/2018    None           Social Documentation as of 12/5/2018    None           Occupational as of 12/5/2018    None           Socioeconomic as of 12/5/2018     Marital Status Spouse Name Number of Children Years Education Education Level Preferred Language Ethnicity Race Source     -- -- -- -- English /White White --    Financial Resource Strain Food Insecurity: Worry Food Insecurity: Inability Transportation Needs: Medical Transportation Needs: Non-medical       -- -- -- -- --             Pertinent History     Question Response Comments    Lives with -- --    Place in Birth Order -- --    Lives in -- --    Number of Siblings -- --    Raised by -- --    Legal Involvement -- --    Childhood Trauma -- --    Criminal History of -- --    Financial Status -- --    Highest Level of Education -- --    Does patient have access to a firearm? -- --     Service -- --    Primary Leisure Activity -- --    Spirituality -- --        Past Medical History:   Diagnosis Date    Liver cirrhosis, alcoholic 09/30/2018     Past Surgical History:   Procedure Laterality Date    EGD (ESOPHAGOGASTRODUODENOSCOPY) N/A 11/6/2018    Performed by Duarte Jules MD at [address])    ESOPHAGOGASTRODUODENOSCOPY N/A 11/6/2018    Procedure: EGD (ESOPHAGOGASTRODUODENOSCOPY);  Surgeon: Duarte Jules MD;  Location: [address]);  Service: Endoscopy;  Laterality: N/A;    EXPLORATORY LAPAROTOMY AFTER LIVER TRANSPLANTATION N/A 11/16/2018    Procedure: LAPAROTOMY, EXPLORATORY,  AFTER LIVER TRANSPLANT;  Surgeon: Amadou Lester Jr., MD;  Location: [address];  Service: Transplant;  Laterality: N/A;    EXPLORATORY LAPAROTOMY AFTER LIVER TRANSPLANTATION N/A 11/18/2018    Procedure: LAPAROTOMY, EXPLORATORY, AFTER LIVER TRANSPLANT, LIKELY  ABDOMINAL CLOSURE;  Surgeon: Amadou Lester Jr., MD;  Location: [address];  Service: Transplant;  Laterality: N/A;    INSERTION OF TUNNELED CENTRAL VENOUS HEMODIALYSIS CATHETER N/A 11/7/2018    Procedure: INSERTION, CATHETER, CENTRAL VENOUS, HEMODIALYSIS, TUNNELED;  Surgeon: Brock Gonzalez MD;  Location: [facility] CATH LAB;  Service: Nephrology;  Laterality: N/A;    INSERTION, CATHETER, CENTRAL VENOUS, HEMODIALYSIS, TUNNELED N/A 11/7/2018    Performed by Brock Gonzalez MD at [facility] CATH LAB    LAPAROTOMY, EXPLORATORY N/A 11/16/2018    Procedure: LAPAROTOMY, EXPLORATORY;  Surgeon: Amadou Lester Jr., MD;  Location: [address];  Service: Transplant;  Laterality: N/A;    LAPAROTOMY, EXPLORATORY N/A 11/16/2018    Performed by Amadou Lester Jr., MD at [facility] OR [address] [place] [address]    LAPAROTOMY, EXPLORATORY, AFTER LIVER TRANSPLANT N/A 11/16/2018    Performed by Amadou Lester Jr., MD at [facility] OR [address] [place] [address]    LAPAROTOMY, EXPLORATORY, AFTER LIVER TRANSPLANT, LIKELY  ABDOMINAL CLOSURE N/A 11/18/2018    Performed by Amadou Lester Jr., MD at [facility] OR [address] [place] [address]    LIVER TRANSPLANT N/A 11/11/2018    Procedure: TRANSPLANT, LIVER;  Surgeon: Shmuel Leary MD;  Location: [address];  Service: Transplant;  Laterality: N/A;    TRANSPLANT, LIVER N/A 11/11/2018    Performed by Shmuel Leary MD at [facility] OR [address] [place] [address]     Family History     None        Tobacco Use    Smoking status: Never Smoker   Substance and Sexual Activity    Alcohol use: Not on file    Drug use: Not on file    Sexual activity: Not on file     Review of patient's allergies indicates:   Allergen Reactions    Cefepime Rash    Penicillins Nausea And Vomiting and  Rash     Full body rash from cefepime as well       No current facility-administered medications on file prior to encounter.      Current Outpatient Medications on File Prior to Encounter   Medication Sig    calcium carbonate/vitamin D3 (VITAMIN D-3 ORAL) Take 1 tablet by mouth once daily.    cyanocobalamin (VITAMIN B-12) 100 MCG tablet Take 100 mcg by mouth once daily.    folic acid (FOLVITE) 1 MG tablet Take 1 mg by mouth once daily.    lactulose (CHRONULAC) 10 gram/15 mL solution Take 20 g by mouth 3 (three) times daily.    multivitamin (THERAGRAN) per tablet Take 1 tablet by mouth once daily.    omeprazole (PRILOSEC) 40 MG capsule Take 40 mg by mouth once daily.    ondansetron (ZOFRAN) 4 MG tablet Take 4 mg by mouth daily as needed for Nausea.    rifAXIMin (XIFAXAN) 550 mg Tab Take 550 mg by mouth 2 (two) times daily.     Psychotherapeutics (From admission, onward)    Start     Stop Route Frequency Ordered    11/30/18 2100  traZODone tablet 50 mg      -- Oral Nightly 11/30/18 1055    11/30/18 1233  trazodone split tablet 25 mg      -- Oral Nightly PRN 11/30/18 1150    11/14/18 1118  haloperidol lactate (HALDOL) 5 mg/mL injection     Comments:  Created by cabinet override    11/14 6439   11/14/18 1118        Review of Systems   Constitutional: Positive for unexpected weight change. Negative for fever.   HENT: Negative for hearing loss.    Eyes: Negative for visual disturbance.   Respiratory: Negative for cough.    Gastrointestinal: Positive for abdominal pain. Negative for nausea.   Genitourinary:        On dialysis   Skin: Negative for rash.   Allergic/Immunologic: Positive for immunocompromised state.   Neurological: Negative for seizures and weakness.   Psychiatric/Behavioral: Positive for agitation, behavioral problems, confusion, decreased concentration, dysphoric mood and sleep disturbance.     Strengths and Liabilities: Strength: Patient accepts guidance/feedback, Strength: Patient is motivated for  "change., Strength: Patient has positive support network.    Objective:     Vital Signs (Most Recent):  Temp: 98 °F (36.7 °C) (12/05/18 1458)  Pulse: 97 (12/05/18 1458)  Resp: 16 (12/05/18 1308)  BP: 115/82 (12/05/18 1458)  SpO2: (!) 87 % (12/05/18 1300) Vital Signs (24h Range):  Temp:  [98 °F (36.7 °C)-98.7 °F (37.1 °C)] 98 °F (36.7 °C)  Pulse:  [] 97  Resp:  [16-29] 16  SpO2:  [87 %-99 %] 87 %  BP: (109-142)/(74-97) 115/82     Height: 5' 10" (177.8 cm)  Weight: 81 kg (178 lb 9.2 oz)  Body mass index is 25.62 kg/m².      Intake/Output Summary (Last 24 hours) at 12/5/2018 1511  Last data filed at 12/5/2018 1458  Gross per 24 hour   Intake 1370 ml   Output 3325 ml   Net -1955 ml     CAM-ICU:  1. Acute change and/or fluctuating course of mental status: Yes  2. Inattention (SAVEAHAART): No  · "Squeeze my hand, only when you hear, the letter 'A'."  3. Altered Level of Consciousness: No  4. Disorganized Thinking (Errors >1/6):  Yes  · "Will a stone float on water?"  · "Are there fish in the sea?"  · "Does one pound weigh more than two?"  · "Can you use a hammer to pound a nail?"  · Command(s):  · "Hold up 2 fingers."  · "Now do the same thing with the other hand."    Score: 1+2 AND, either 3 or 4 present = Positive CAM-ICU    Mental Status Exam:  Appearance: age appropriate, lying in bed, diffuse jaundice  Grooming: appropriate to situation  Arousal: awake.  Behavior/Cooperation: cooperative, psychomotor retardation  Speech: slowed, delayed, increased latency of response, non-spontaneous  Language: appropriate english vocabulary  Mood: neutral  Affect: constricted  Thought Process: poverty of thought, perseverative  Thought Content: poverty of content  Associations: no loose associations noted  Orientation: person, situation, day of week, month of year, year  Memory: Limited.  Recall 0/3 at 5 minutes.  Fund of Knowledge: Decreased  Attention Span/Concentration:  Unable/unwilling to complete serial 7s.  Cognition: " similarities were concrete  Insight: fair  Judgment: poor     Significant Labs: All pertinent labs within the past 24 hours have been reviewed.       Ref. Range 11/29/2018 07:03 11/30/2018 07:23 12/1/2018 07:22 12/2/2018 06:50 12/3/2018 06:30 12/4/2018 07:02 12/5/2018 06:30   Tacrolimus Lvl Latest Ref Range: 5.0 - 15.0 ng/mL 9.3 13.8 14.4 9.4 12.3 14.1 6.7       Significant Imaging: I have reviewed all pertinent imaging results/findings within the past 24 hours.

## 2018-11-21 NOTE — PT/OT/SLP RE-EVAL
Occupational Therapy   Re-evaluation    Name: Femi Enciso  MRN: [id number]  Admitting Diagnosis:  S/P liver transplant  11/11; exp-lap 11/16; re-exploration 11/18 3 Days Post-Op    Recommendations:     Discharge Recommendations: nursing facility, skilled(may progress to )  Discharge Equipment Recommendations:  (TBD closer to d/c)  Barriers to discharge:  None    History:     Past Medical History:   Diagnosis Date    Liver cirrhosis, alcoholic 09/30/2018       Past Surgical History:   Procedure Laterality Date    EGD (ESOPHAGOGASTRODUODENOSCOPY) N/A 11/6/2018    Performed by Duarte Jules MD at [facility] ([address] [place] [address])    ESOPHAGOGASTRODUODENOSCOPY N/A 11/6/2018    Procedure: EGD (ESOPHAGOGASTRODUODENOSCOPY);  Surgeon: Duarte Jules MD;  Location: [facility] ([address] [place] [address]);  Service: Endoscopy;  Laterality: N/A;    EXPLORATORY LAPAROTOMY AFTER LIVER TRANSPLANTATION N/A 11/16/2018    Procedure: LAPAROTOMY, EXPLORATORY, AFTER LIVER TRANSPLANT;  Surgeon: Amadou Lester Jr., MD;  Location: [facility] OR [address] [place] [address];  Service: Transplant;  Laterality: N/A;    EXPLORATORY LAPAROTOMY AFTER LIVER TRANSPLANTATION N/A 11/18/2018    Procedure: LAPAROTOMY, EXPLORATORY, AFTER LIVER TRANSPLANT, LIKELY  ABDOMINAL CLOSURE;  Surgeon: Amadou Lester Jr., MD;  Location: [facility] OR [address] [place] [address];  Service: Transplant;  Laterality: N/A;    INSERTION OF TUNNELED CENTRAL VENOUS HEMODIALYSIS CATHETER N/A 11/7/2018    Procedure: INSERTION, CATHETER, CENTRAL VENOUS, HEMODIALYSIS, TUNNELED;  Surgeon: Brock Gonzalez MD;  Location: [facility] CATH LAB;  Service: Nephrology;  Laterality: N/A;    INSERTION, CATHETER, CENTRAL VENOUS, HEMODIALYSIS, TUNNELED N/A 11/7/2018    Performed by Brock Gonzalez MD at [facility] CATH LAB    LAPAROTOMY, EXPLORATORY N/A 11/16/2018    Procedure: LAPAROTOMY, EXPLORATORY;  Surgeon: Amadou Lester Jr., MD;  Location: [facility] OR [address] [place] [address];  Service: Transplant;  Laterality: N/A;    LAPAROTOMY, EXPLORATORY N/A 11/16/2018    Performed  by Amadou Lester Jr., MD at [facility] OR [address] [place] [address]    LAPAROTOMY, EXPLORATORY, AFTER LIVER TRANSPLANT N/A 11/16/2018    [facility]    LAPAROTOMY, EXPLORATORY, AFTER LIVER TRANSPLANT, LIKELY  ABDOMINAL CLOSURE N/A 11/18/2018    [facility]    LIVER TRANSPLANT N/A 11/11/2018    Procedure: TRANSPLANT, LIVER;  Surgeon: Shmuel Leary MD;  Location: [facility] OR [address] [place] [address];  Service: Transplant;  Laterality: N/A;    TRANSPLANT, LIVER N/A 11/11/2018    Performed by Shmuel Leary MD at [facility] OR [address] [place] [address]       Subjective     Chief Complaint: weakness  Patient/Family stated goals: to get better  Communicated with: RN prior to session.  Pain/Comfort:  · Pain Rating 1: 0/10  · Pain Rating Post-Intervention 1: 0/10    Objective:     Patient found with: blood pressure cuff, pulse ox (continuous), telemetry, JOSE A drain, arterial line, central line(R subclavian Kun cath)    General Precautions: Standard, fall   Orthopedic Precautions:N/A   Braces:       Occupational Performance:    Bed Mobility:    · Patient completed Rolling/Turning to Right with maximal assistance  · Patient completed Scooting/Bridging with maximal assistance  · Patient completed Supine to Sit with maximal assistance  · Patient completed Sit to Supine with maximal assistance    Functional Mobility/Transfers:  · Patient completed Sit <> Stand Transfer with moderate assistance  with  no assistive device    · Max A x 2 sidesteps along EOB with B HHA    Activities of Daily Living:  · Feeding:  NT ; minimal A simulated to bring cup to mouth  · Grooming: minimum assistance partially completed in standing  · Upper Body Dressing: maximal assistance    · Lower Body Dressing: maximal assistance    · Toileting: maximal assistance      Cognitive/Visual Perceptual:  Pt is alert and oriented; following commands    Physical Exam:  Postural examination/scapula alignment:    -   "     Rounded shoulders  Sensation:    -       Intact  Upper Extremity Range of Motion:     -       Right Upper Extremity: WFL  -       Left Upper Extremity: WFL  Upper Extremity Strength:    -       Right Upper Extremity: WFL  -       Left Upper Extremity: WFL   Strength:    -       Right Upper Extremity: WFL  -       Left Upper Extremity: WFL  Fine Motor Coordination:    -       Intact  Gross motor coordination:   WFL    Patient left HOB elevated with all lines intact, call button in reach, Rn notified and family present    [facility] 6 Click:  [facility] Total Score: 14    Treatment & Education:  Pt ed on OT POC  Pt sat EOB during ADL with SBA  Pt stood while engaged in oral hygiene task x ~ one minute then had to sit to complete  Pt left supine following tx 2* restarting dialysis  Education:    Assessment:     Femi Enciso is a 53 y.o. male with a medical diagnosis of S/P liver transplant.   Performance deficits affecting function are weakness, gait instability, impaired endurance, impaired balance, decreased safety awareness, impaired self care skills, impaired functional mobilty, impaired cardiopulmonary response to activity.      Rehab Prognosis:  Good; patient would benefit from acute skilled OT services to address these deficits and reach maximum level of function.         Clinical Decision Makin.  OT Mod:  "Pt evaluation falls under moderate complexity for evaluation coding due to identification of 3-5 performance deficits noted as stated above. Eval required Min/Mod assistance to complete on this date and detailed assessment(s) were utilized. Moreover, an expanded review of history and occupational profile obtained with additional review of cognitive, physical and psychosocial hx."     Plan:     Patient to be seen 4 x/week to address the above listed problems via self-care/home management, therapeutic activities, therapeutic exercises  · Plan of Care Expires: 18  · Plan of Care Reviewed with: " patient, spouse, mother    This Plan of care has been discussed with the patient who was involved in its development and understands and is in agreement with the identified goals and treatment plan    GOALS:   Multidisciplinary Problems     Occupational Therapy Goals        Problem: Occupational Therapy Goal    Goal Priority Disciplines Outcome Interventions   Occupational Therapy Goal     OT, PT/OT Ongoing (interventions implemented as appropriate)    Description:  Goals to be met by: 12/5/18     Patient will increase functional independence with ADLs by performing:    UE Dressing with Supervision.  LE Dressing with Minimal Assistance.  Grooming while standing at sink with Stand-by Assistance.  Toileting from toilet with Minimal Assistance for hygiene and clothing management.   Toilet transfer to toilet with Stand-by Assistance.  Upper extremity  theraband exercise program x  15 reps  with independence.               Problem: Occupational Therapy Goal    Goal Priority Disciplines Outcome Interventions   Occupational Therapy Goal     OT, PT/OT                     Time Tracking:     OT Date of Treatment: 11/21/18  OT Start Time: 0901  OT Stop Time: 0920  OT Total Time (min): 19 min    Billable Minutes:Re-eval 10  Self Care/Home Management 8    ISACC [place]  11/21/2018

## 2018-11-21 NOTE — PROGRESS NOTES
[facility]  Critical Care - Surgery  Progress Note    Patient Name: Femi Enciso  MRN: [id number]  Admission Date: 10/27/2018  Hospital Length of Stay: 25 days  Code Status: Full Code  Attending Provider: Femi Patel MD  Primary Care Provider: Primary Doctor No   Principal Problem: S/P liver transplant    Subjective:     Hospital/ICU Course:  11/11: s/p liver transplant  11/12 - extubated, weaned off pressors; pulled out all 3 JOSE A drains  11/13 - CRRT held overnight; 1U Plt  11/14 -2u pRBC  11/15- 1u prbc  11/16- OR x 2 for bleeding, abthera + JOSE A x 1, 7 PRBC, 3 plt, 1 FFP  11/17 - 2 Plt, 1 FFP, 2 RBCs    [facility]  Liver Transplant  Progress Note     Patient Name: Femi Enciso  MRN: [id number]  Admission Date: 10/27/2018  Hospital Length of Stay: 22 days  Code Status: Full Code  Primary Care Provider: Primary Doctor No  Post-Op Day 3 Days Post-Op        Subjective:      Hospital/ICU Course:  11/11: s/p liver transplant  11/12 - extubated, weaned off pressors; pulled out all 3 JOSE A drains  11/13 - CRRT held overnight; 1U Plt  11/14 -2u pRBC  11/15- 1u prbc  11/16- OR for bleeding, abthera + JOSE A x 1, 7 PRBC, 3 plt, 1 FFP  11/17- 2U pRBCs and RU plts   11/18- OR for ex lap, hemostasis achieved     Interval History/Significant Events:  NAEON. HDS, no pressors. On CRRT for 13 hours yesterday. Pt is AAO to person, place and time this AM. Pt tolerating renal diet. Pain well controlled this AM.     Follow-up For: Procedure(s) (LRB):  LAPAROTOMY, EXPLORATORY, AFTER LIVER TRANSPLANT, LIKELY  ABDOMINAL CLOSURE (N/A)    Post-Operative Day: 3 Days Post-Op    Scheduled Meds:   ciprofloxacin (CIPRO)400mg/200ml D5W IVPB  400 mg Intravenous Q12H    famotidine (PF)  20 mg Intravenous Daily    heparin (porcine)  5,000 Units Subcutaneous Q8H    hydrocortisone sodium succinate  50 mg Intravenous Q8H    custom IVPB builder   Intravenous Q8H     Followed by    [START ON 11/20/2018] custom IVPB  builder   Intravenous Q24H    k phos di & mono-sod phos mono  2 tablet Oral TID    linezolid 600mg/300ml  600 mg Intravenous Q12H    sodium phosphate IVPB  30 mmol Intravenous Once    [START ON 11/21/2018] valganciclovir 50 mg/ml  450 mg Per NG tube Daily      Continuous Infusions:   sodium chloride 0.9% 200 mL/hr at 11/18/18 1115    insulin (HUMAN R) infusion (adults) 0.8 Units/hr (11/18/18 1200)    norepinephrine bitartrate-D5W Stopped (11/17/18 1100)    propofol 30 mcg/kg/min (11/18/18 1230)    TPN ADULT CENTRAL LINE CUSTOM 60 mL/hr at 11/18/18 1200    TPN ADULT CENTRAL LINE CUSTOM        PRN Meds:sodium chloride, sodium chloride, sodium chloride, sodium chloride, sodium chloride, sodium chloride, dextrose 50%, dextrose 50%, fentaNYL, glucagon (human recombinant), glucose, glucose, insulin aspart U-100, magnesium sulfate IVPB, potassium phosphate IVPB, ramelteon       Objective:      Vital Signs (Most Recent):  Temp: 97.9 °F (36.6 °C) (11/18/18 1100)  Pulse: 79 (11/18/18 1215)  Resp: (!) 25 (11/18/18 0800)  BP: (!) 100/59 (11/18/18 1200)  SpO2: 100 % (11/18/18 1215) Vital Signs (24h Range):  Temp:  [96.8 °F (36 °C)-98.4 °F (36.9 °C)] 97.9 °F (36.6 °C)  Pulse:  [63-98] 79  Resp:  [10-33] 25  SpO2:  [97 %-100 %] 100 %  BP: ()/(59-80) 100/59  Arterial Line BP: (3-149)/(0-82) 97/50      Weight: 98.1 kg (216 lb 4.3 oz)  Body mass index is 31.03 kg/m².            Intake/Output - Last 3 Shifts        11/16 0700 - 11/17 0659 11/17 0700 - 11/18 0659 11/18 0700 - 11/19 0659     I.V. (mL/kg) 7797.6 (79.5) 8108.2 (82.7) 773 (7.9)     Blood 2456 1081       NG/GT   60       TPN   461       Total Intake(mL/kg) 79758.6 (104.5) 9710.2 (99) 773 (7.9)     Urine (mL/kg/hr)   10 (0) 0 (0)     Drains 120 305 160     Other 4223 7434 2851     Stool           Blood 100         Total Output 4443 7749 3011     Net +5810.6 +1961.2 -2238                         Physical Exam   Constitutional: He appears well-developed.    jaundiced   HENT:   Head: Normocephalic and atraumatic.   Eyes: Scleral icterus is present.   Cardiovascular: Normal rate, regular rhythm and intact distal pulses.   Pulmonary/Chest: Effort normal. No respiratory distress.   Sats > 95 on NC   Abdominal: Soft. He exhibits no distension.   Abdominal dressings with some serous oozing through dressing.   JOSE A drains in place with serous output.   Musculoskeletal: He exhibits edema.   Neurological:   AAOx3  Skin: Skin is warm and dry.   Jaundice improving         Laboratory:      Immunosuppressants      None          Labs within the past 24 hours have been reviewed.     Diagnostic Results:  I have personally reviewed all pertinent imaging studies.           Assessment/Plan:     * S/P liver transplant    Assessment/Plan:   Femi Enciso is a 53 y.o. male now s/p OLT         * S/P liver transplant        53 year old male with history of ESLD 2/2 ETOH cirrhosis who is s/p liver transplant 11/11/18     Neuro  -off sedation  -pain: dilaudid, oxycodone prn     CV  -HDS, off pressors  -given albumin 250 cc for HoTN overnight   -swan tricia catheter removed  -monitor on telemetry      Resp  -extubated. sats >95 on NC  -ABGs prn  -daily CXR   -duonebs prn  -CXR improved from repeat yesterday      Heme  -H/H  -INR      ID  -AF  -f/u cultures - VRE from peritoneal fluid  -Abx: Cefepime, flagyl and linezolid      MSK  -OOBTC this AM     FEN/GI  -replace lytes prn  -renal diet   - TPN @ 60  -s/p ex-lap 11/16 and 11/18   - CT abdomen/pelvis (non-contrast) 11/19 appears to demonstrate stability      Endocrine  -insulin as needed  -accuchecks       -CRRT per renal     PPX:  -SQH, PPI     dispo  -can stepdown when bed available               Critical care was time spent personally by me on the following activities: development of treatment plan with patient or surrogate and bedside caregivers, discussions with consultants, evaluation of patient's response to treatment, examination of  patient, ordering and performing treatments and interventions, ordering and review of laboratory studies, ordering and review of radiographic studies, pulse oximetry, re-evaluation of patient's condition.  This critical care time did not overlap with that of any other provider or involve time for any procedures.     Ana Albarado MD  Critical Care - Surgery  [facility]

## 2018-11-21 NOTE — PROGRESS NOTES
"[facility]  Endocrinology  Progress Note    Admit Date: 10/27/2018     Reason for Consult: Management of Hyperglycemia and type 2 DM    Surgical Procedure and Date: liver transplant 18; exploratory lap and liver biopsy on 18      Diabetes diagnosis year: ~ 3-4 years ago     Home Diabetes Medications:  none; previously on metformin 1000 mg BID    How often checking glucose at home? Not checking  BG readings on regimen: n/a  Hypoglycemia on the regimen?  n/a  Missed doses on regimen? n/a    Diabetes Complications include:     DORIAN    Complicating diabetes co morbidities:   CIRRHOSIS and Glucocorticoid use       HPI:   Patient is a 53 y.o. male with a diagnosis of ESLD secondary to ETOH abuse and DEL with ESRD with RRT. Patient is now s/p liver transplant. Endocrinology consulted for BG management.       Lab Results   Component Value Date    HGBA1C 4.4 2018             Interval HPI:   Overnight events:  Remains in ICU, awaiting stepdown bed. CRRT. BG at or below goal overnight; insulin infusion decreased from 1.6-1.1 u/hr per orders. Hydrocortisone 25 mg every 8 hours.   Eatin%  Nausea: No  Hypoglycemia and intervention: No  Fever: No  TPN and/or TF: No- discontinued overnight     /66 (BP Location: Left arm, Patient Position: Lying)   Pulse 90   Temp 98 °F (36.7 °C) (Oral)   Resp 16   Ht 5' 10" (1.778 m)   Wt 98.1 kg (216 lb 4.3 oz)   SpO2 97%   BMI 31.03 kg/m²      Labs Reviewed and Include    Recent Labs   Lab 18  0610   GLU 91  91  91   CALCIUM 7.6*  7.6*  7.6*   ALBUMIN 1.8*  1.8*  1.8*   PROT 3.7*     139  139   K 4.4  4.4  4.4   CO2 24  24  24     108  108   BUN 13  13  13   CREATININE 0.8  0.8  0.8   ALKPHOS 233*   ALT 54*   AST 29   BILITOT 13.4*     Lab Results   Component Value Date    WBC 35.02 (H) 2018    HGB 10.0 (L) 2018    HCT 30.6 (L) 2018    MCV 91 2018    PLT 50 (L) 2018     No " results for input(s): TSH, FREET4 in the last 168 hours.  Lab Results   Component Value Date    HGBA1C 4.4 11/09/2018       Nutritional status:   Body mass index is 31.03 kg/m².  Lab Results   Component Value Date    ALBUMIN 1.8 (L) 11/21/2018    ALBUMIN 1.8 (L) 11/21/2018    ALBUMIN 1.8 (L) 11/21/2018     Lab Results   Component Value Date    PREALBUMIN 7 (L) 10/27/2018       Estimated Creatinine Clearance: 125.4 mL/min (based on SCr of 0.8 mg/dL).    Accu-Checks  Recent Labs     11/19/18  2126 11/20/18  0013 11/20/18  0400 11/20/18  0757 11/20/18  1312 11/20/18  1756 11/20/18  2158 11/20/18  2359 11/21/18  0103 11/21/18  0224   POCTGLUCOSE 236* 168* 168* 223* 219* 269* 166* 114* 122* 105       Current Medications and/or Treatments Impacting Glycemic Control  Immunotherapy:    Immunosuppressants         Stop Route Frequency     tacrolimus capsule 1 mg      -- Oral 2 times daily        Steroids:   Hormones (From admission, onward)    Start     Stop Route Frequency Ordered    11/20/18 1400  hydrocortisone sodium succinate injection 25 mg      -- IV Every 8 hours 11/20/18 1205    11/09/18 1345  methylPREDNISolone sodium succinate injection 500 mg  (Med - Immunosuppression Induction Therapy (Methylprednisolone))      11/10 0144 IV Once pre-op 11/09/18 1236        Pressors:    Autonomic Drugs (From admission, onward)    None        Hyperglycemia/Diabetes Medications:   Antihyperglycemics (From admission, onward)    Start     Stop Route Frequency Ordered    11/20/18 1145  insulin aspart U-100 pen 6-8 Units      -- SubQ 3 times daily with meals 11/20/18 1039    11/18/18 0830  insulin regular (Humulin R) 100 Units in sodium chloride 0.9% 100 mL infusion      -- IV Continuous 11/18/18 0726    11/18/18 0826  insulin aspart U-100 pen 0-10 Units      -- SubQ As needed (PRN) 11/18/18 0726          ASSESSMENT and PLAN    * S/P liver transplant    Managed per LTS.   avoid hypoglycemia  Optimize BG control for surgical wound  healing.     Lab Results   Component Value Date    ALT 54 (H) 11/21/2018    AST 29 11/21/2018     (H) 11/21/2018    ALKPHOS 233 (H) 11/21/2018    BILITOT 13.4 (H) 11/21/2018            Type 2 diabetes mellitus without complication    BG goal 140-180      Discontinue IV insulin.   BG monitoring every ac/hs and moderate dose correction scale.       Discharge plans- TBD     Sequelae of hyperalimentation    Elevating BG readings.        Adrenal cortical steroids causing adverse effect in therapeutic use    On standard steroid taper per transplant team; may elevate BG readings         DEL (acute kidney injury)    Avoid insulin stacking and hypoglycemia.  CRRT per nephrology  Lab Results   Component Value Date    CREATININE 0.8 11/21/2018    CREATININE 0.8 11/21/2018    CREATININE 0.8 11/21/2018            Acute renal failure    Avoid insulin stacking and hypoglycemia.         Prophylactic immunotherapy    May increase insulin resistance.            Tessa Falk NP  Endocrinology  [facility]

## 2018-11-21 NOTE — ASSESSMENT & PLAN NOTE
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   - s/p OHLTx 11/11/2018; intra-op SLED  - remaining anuric, clearance numbers look great, volume status improved and +/- even past 24hrs, also less input and looks like running overnight only we will be able to match Is & Os, also SCUF overnight should be adequate as he has been getting more or less continuous clearance over the past few days    Plan:  -SCUF overnight, goal to match Is & Os, reassess in am

## 2018-11-21 NOTE — ASSESSMENT & PLAN NOTE
Managed per LTS.   avoid hypoglycemia  Optimize BG control for surgical wound healing.     Lab Results   Component Value Date    ALT 54 (H) 11/21/2018    AST 29 11/21/2018     (H) 11/21/2018    ALKPHOS 233 (H) 11/21/2018    BILITOT 13.4 (H) 11/21/2018

## 2018-11-21 NOTE — ASSESSMENT & PLAN NOTE
Assessment/Plan:   Femi Enciso is a 53 y.o. male now s/p OLT         * S/P liver transplant        53 year old male with history of ESLD 2/2 ETOH cirrhosis who is s/p liver transplant 11/11/18     Neuro  -off sedation  -pain: dilaudid, oxycodone prn     CV  -HDS, off pressors  -given albumin 250 cc for HoTN overnight   -swan tricia catheter removed  -monitor on telemetry      Resp  -extubated. sats >95 on NC  -ABGs prn  -daily CXR   -duonebs prn  -CXR improved from repeat yesterday      Heme  -H/H  -INR   -q4 CBCs, transfuse products as necessary     ID  -AF  -f/u cultures   -Abx: Cipro, fluconazole and linezolid      MSK  -OOBTC this AM     FEN/GI  -replace lytes prn  -renal diet   - TPN @ 60  -s/p ex-lap 11/16 and 11/18   - CT abdomen/pelvis (non-contrast) yesterday 11/19 appears to demonstrate stability      Endocrine  -insulin as needed  -accuchecks       -CRRT per renal     PPX:  -SQH, PPI     dispo  -continue ICU care

## 2018-11-21 NOTE — SUBJECTIVE & OBJECTIVE
Interval History: on SLED yesterday and overnight, volume status improved accounting for insensible losses +/- almost running even and clinically looks great    Review of patient's allergies indicates:   Allergen Reactions    Penicillins Nausea And Vomiting and Rash     Full body rash from cefepime as well     Current Facility-Administered Medications   Medication Frequency    0.9%  NaCl infusion (CRRT USE ONLY) Continuous    ceFEPIme injection 1 g Q12H    DAPTOmycin (CUBICIN) 980 mg in sodium chloride 0.9% IVPB Q24H    dextrose 50% injection 12.5 g PRN    dextrose 50% injection 25 g PRN    famotidine tablet 20 mg QHS    glucagon (human recombinant) injection 1 mg PRN    glucose chewable tablet 16 g PRN    glucose chewable tablet 24 g PRN    heparin (porcine) injection 5,000 Units Q8H    hydrocortisone sodium succinate injection 25 mg Q8H    HYDROmorphone injection 0.2 mg Q3H PRN    insulin aspart U-100 pen 1-10 Units QID (AC + HS) PRN    isavuconazonium sulfate Cap 372 mg Daily    k phos di & mono-sod phos mono 250 mg tablet 2 tablet TID    magnesium sulfate 2g in water 50mL IVPB (premix) PRN    metroNIDAZOLE tablet 500 mg Q8H    oxyCODONE immediate release tablet 5 mg Q4H PRN    oxyCODONE immediate release tablet Tab 10 mg Q4H PRN    potassium phosphate 15 mmol in dextrose 5 % 250 mL infusion BID PRN    ramelteon tablet 8 mg Nightly PRN    tacrolimus capsule 1 mg BID    [START ON 11/22/2018] valGANciclovir tablet 450 mg Daily       Objective:     Vital Signs (Most Recent):  Temp: 98.1 °F (36.7 °C) (11/21/18 1045)  Pulse: 91 (11/21/18 0855)  Resp: (!) 25 (11/21/18 0855)  BP: 112/74 (11/21/18 0702)  SpO2: 99 % (11/21/18 0855)  O2 Device (Oxygen Therapy): room air (11/21/18 0855) Vital Signs (24h Range):  Temp:  [97.8 °F (36.6 °C)-99 °F (37.2 °C)] 98.1 °F (36.7 °C)  Pulse:  [] 91  Resp:  [11-33] 25  SpO2:  [97 %-100 %] 99 %  BP: ()/(63-80) 112/74  Arterial Line BP: ()/(48-72)  93/50     Weight: 98.1 kg (216 lb 4.3 oz) (11/15/18 0500)  Body mass index is 31.03 kg/m².  Body surface area is 2.2 meters squared.    I/O last 3 completed shifts:  In: 8151.3 [P.O.:500; I.V.:5810.1]  Out: 6297 [Drains:840; Other:5453; Stool:4]    Physical Exam   Constitutional: He is oriented to person, place, and time.   HENT:   Head: Atraumatic.   Neck: No JVD present.   Cardiovascular: Normal rate and regular rhythm. Exam reveals no friction rub.   Pulmonary/Chest: Effort normal and breath sounds normal.   Abdominal: Soft. He exhibits distension.   Musculoskeletal: He exhibits edema.   Neurological: He is alert and oriented to person, place, and time.   Skin: Skin is warm.   Psychiatric: He has a normal mood and affect.

## 2018-11-21 NOTE — SIGNIFICANT EVENT
Time Stroke Code initiated:1205  Stroke team Arrival time:1215  Stroke Code activation triggers: lethargic  Vascular Neurologist you spoke with: Jacklyn    Arrived at CT:1230  CT completed:1245    tPA decision:n/a  tPA bolus (mg and time):  tPA infusion (mg and time):  tPA end time:    IR decision:n/a  IR arrival:  IR end time:     Pt transferred to: SICU [id number]  Report given to:Paige JAMESON to reassume care

## 2018-11-21 NOTE — CONSULTS
Pt's chart has been reviewed, pt has been seen. Full staffed note to follow on tomorrow.    Jade Ochoa MD  [employer] [facility]  PGY-2

## 2018-11-21 NOTE — PROGRESS NOTES
[facility]  Infectious Disease  Progress Note    Patient Name: Femi Enciso  MRN: [id number]  Admission Date: 10/27/2018  Length of Stay: 25 days  Attending Physician: Femi Patel MD  Primary Care Provider: Primary Doctor No    Isolation Status: Contact  Assessment/Plan:      Delirium    52yo man w/a history of DM2 and alcohol dependence with associated hepatitis who was admitted on 10/27/2018 with acute liver failure (c/b PSE, HRS, hepatic hydrothorax) and ultimately underwent liver transplantation (s/p DBDLT 11/11/2018, CMV D+/R-, steroid induction, on maintenance tacro/MMF/pred; c/b post-operative delirium, VRE bacteruria, and IA hemorrhage requiring re-do ex-lap, RP/retrorenal/retrocolic hematoma evacuation, cauterization of RP bleed, partial right adrenalectomy, and temporary vac abdominal closure on 11/16/2018 and planned benign second look washout on 11/18/2018). Patient's bleeding has sabilized since cauterization but OR cultures have grown VRE.faecium confirming that this hematoma was infected. He has decompensated today with acute AMS concerning for CVA clinically vs toxic/metabolic induced delirium with an ongoing leukocytosis despite appropriate coverage per cultures.    - would change linezolid to daptomycin as isolate is sensitive and this agent will be better tolerated long term (no expected significant myelosuppression as with linezolid); weekly CPK needed and contact precautions  - would broaden GNR coverage to cefepime/flagyl instead of cipro empirically given persistent leukocytosis  - will repeat blood cultures given leukocytosis/AMS   - would continue posaconazole for mold prophylaxis per institutional protocol  - remainder of prophylaxis per protocol  - will f/u pending operative cultures and pending imaging studies to tailor therapy         Anticipated Disposition: pending improvement    Thank you for your consult. I will follow-up with patient. Please contact us if you  have any additional questions.     Rosalee Ireland MD  Transplant ID Attending  [phone]    Rosalee Ireland MD  Infectious Disease  [facility]    Subjective:     Principal Problem:S/P liver transplant    HPI: No notes on file  Interval History: Acute AMS noted late morning (not meaningfully responsive/aphasic). Afebrile. VRE in washout cultures only but WBC remains significantly elevated. CT head pending.    Review of Systems   Constitutional: Negative for activity change, appetite change, chills, diaphoresis and fever.   HENT: Negative for ear pain, mouth sores, sinus pressure and sore throat.    Eyes: Negative for photophobia, pain and redness.   Respiratory: Negative for cough, shortness of breath and wheezing.    Cardiovascular: Positive for leg swelling. Negative for chest pain.   Gastrointestinal: Positive for abdominal pain. Negative for abdominal distention, diarrhea and nausea.   Genitourinary: Negative for dysuria, flank pain, frequency and urgency.   Musculoskeletal: Negative for arthralgias, back pain, gait problem and myalgias.   Skin: Negative for pallor and rash.   Neurological: Negative for dizziness, tremors, seizures and headaches.   Psychiatric/Behavioral: Negative for confusion.     Objective:     Vital Signs (Most Recent):  Temp: (P) 98.5 °F (36.9 °C) (11/21/18 1502)  Pulse: (!) 115 (11/21/18 1210)  Resp: (!) 47 (11/21/18 1210)  BP: (!) 182/90 (11/21/18 1210)  SpO2: 100 % (11/21/18 1210) Vital Signs (24h Range):  Temp:  [97.8 °F (36.6 °C)-99 °F (37.2 °C)] (P) 98.5 °F (36.9 °C)  Pulse:  [] 115  Resp:  [11-47] 47  SpO2:  [97 %-100 %] 100 %  BP: ()/(60-90) 182/90  Arterial Line BP: ()/(48-72) 93/50     Weight: 98.1 kg (216 lb 4.3 oz)  Body mass index is 31.03 kg/m².    Estimated Creatinine Clearance: 91.2 mL/min (based on SCr of 1.1 mg/dL).    Physical Exam   Constitutional: He appears well-developed. No distress.   HENT:   Head: Atraumatic.   Mouth/Throat:  Oropharynx is clear and moist. No oropharyngeal exudate.   Eyes: Conjunctivae and EOM are normal. Pupils are equal, round, and reactive to light. Scleral icterus is present.   Neck: Neck supple.   Cardiovascular: Normal rate and regular rhythm. Exam reveals no friction rub.   No murmur heard.  Pulmonary/Chest: Breath sounds normal. No respiratory distress. He has no wheezes. He has no rales. He exhibits no tenderness.   Abdominal: Soft. Bowel sounds are normal. He exhibits distension. There is tenderness. There is no rebound and no guarding.   Brownish SS fluid in JOSE A. [place]ron dressed.   Musculoskeletal: Normal range of motion. He exhibits edema.   Lymphadenopathy:     He has no cervical adenopathy.   Neurological: He is alert.   Skin: No rash noted. No erythema.       Significant Labs:   CBC:   Recent Labs   Lab 11/20/18  0310 11/20/18  0746 11/21/18  0610   WBC 34.15* 32.33* 35.02*   HGB 9.7* 10.1* 10.0*   HCT 29.4* 29.9* 30.6*   PLT 60* 69* 50*     CMP:   Recent Labs   Lab 11/20/18  0310 11/20/18  0746  11/20/18  2159 11/21/18  0610 11/21/18  1320     140  140 138   < > 140  140 139  139  139 137   K 4.4  4.4  4.4 4.2   < > 4.1  4.1 4.4  4.4  4.4 4.7     107  107 107   < > 107  107 108  108  108 106   CO2 24  24  24 23   < > 27  27 24  24  24 23     106  106 190*   < > 173*  173* 91  91  91 136*   BUN 11  11  11 12   < > 14  14 13  13  13 21*   CREATININE 0.7  0.7  0.7 0.8   < > 0.8  0.8 0.8  0.8  0.8 1.1   CALCIUM 7.7*  7.7*  7.7* 7.4*   < > 7.5*  7.5* 7.6*  7.6*  7.6* 7.8*   PROT 3.8* 3.7*  --   --  3.7*  --    ALBUMIN 1.9*  1.9*  1.9* 2.0*   < > 1.8*  1.8* 1.8*  1.8*  1.8* 1.7*   BILITOT 13.0* 13.6*  --   --  13.4*  --    ALKPHOS 230* 203*  --   --  233*  --    AST 32 27  --   --  29  --    ALT 54* 56*  --   --  54*  --    ANIONGAP 9  9  9 8   < > 6*  6* 7*  7*  7* 8   EGFRNONAA >60.0  >60.0  >60.0 >60.0   < > >60.0  >60.0 >60.0  >60.0   >60.0 >60.0    < > = values in this interval not displayed.       Significant Imaging: I have reviewed all pertinent imaging results/findings within the past 24 hours.

## 2018-11-22 PROBLEM — G93.40 ACUTE ENCEPHALOPATHY: Status: ACTIVE | Noted: 2018-11-22

## 2018-11-22 LAB
ALBUMIN SERPL BCP-MCNC: 1.7 G/DL
ALBUMIN SERPL BCP-MCNC: 2.3 G/DL
ALBUMIN SERPL BCP-MCNC: 2.6 G/DL
ALP SERPL-CCNC: 290 U/L
ALT SERPL W/O P-5'-P-CCNC: 66 U/L
ANION GAP SERPL CALC-SCNC: 10 MMOL/L
ANION GAP SERPL CALC-SCNC: 6 MMOL/L
ANION GAP SERPL CALC-SCNC: 8 MMOL/L
ANISOCYTOSIS BLD QL SMEAR: SLIGHT
APTT BLDCRRT: 30.6 SEC
AST SERPL-CCNC: 41 U/L
BASOPHILS NFR BLD: 2 %
BILIRUB DIRECT SERPL-MCNC: 9.2 MG/DL
BILIRUB SERPL-MCNC: 11.9 MG/DL
BUN SERPL-MCNC: 11 MG/DL
BUN SERPL-MCNC: 12 MG/DL
BUN SERPL-MCNC: 8 MG/DL
CALCIUM SERPL-MCNC: 7.6 MG/DL
CALCIUM SERPL-MCNC: 7.7 MG/DL
CHLORIDE SERPL-SCNC: 110 MMOL/L
CHLORIDE SERPL-SCNC: 112 MMOL/L
CO2 SERPL-SCNC: 19 MMOL/L
CO2 SERPL-SCNC: 23 MMOL/L
CREAT SERPL-MCNC: 0.7 MG/DL
CREAT SERPL-MCNC: 0.8 MG/DL
DIFFERENTIAL METHOD: ABNORMAL
EOSINOPHIL NFR BLD: 1 %
ERYTHROCYTE [DISTWIDTH] IN BLOOD BY AUTOMATED COUNT: 18.5 %
EST. GFR  (AFRICAN AMERICAN): >60 ML/MIN/1.73 M^2
EST. GFR  (NON AFRICAN AMERICAN): >60 ML/MIN/1.73 M^2
GGT SERPL-CCNC: 406 U/L
GLUCOSE SERPL-MCNC: 120 MG/DL
GLUCOSE SERPL-MCNC: 122 MG/DL
GLUCOSE SERPL-MCNC: 125 MG/DL
GRAM STN SPEC: NORMAL
GRAM STN SPEC: NORMAL
HCT VFR BLD AUTO: 31.2 %
HGB BLD-MCNC: 10.1 G/DL
IMM GRANULOCYTES # BLD AUTO: ABNORMAL K/UL
IMM GRANULOCYTES NFR BLD AUTO: ABNORMAL %
INR PPP: 1.2
LYMPHOCYTES NFR BLD: 3 %
MAGNESIUM SERPL-MCNC: 1.8 MG/DL
MAGNESIUM SERPL-MCNC: 2 MG/DL
MAGNESIUM SERPL-MCNC: 2.1 MG/DL
MAGNESIUM SERPL-MCNC: 2.1 MG/DL
MCH RBC QN AUTO: 29.4 PG
MCHC RBC AUTO-ENTMCNC: 32.4 G/DL
MCV RBC AUTO: 91 FL
METAMYELOCYTES NFR BLD MANUAL: 1 %
MONOCYTES NFR BLD: 9 %
MYELOCYTES NFR BLD MANUAL: 1 %
NEUTROPHILS NFR BLD: 83 %
NRBC BLD-RTO: 0 /100 WBC
PHOSPHATE SERPL-MCNC: 2 MG/DL
PHOSPHATE SERPL-MCNC: 2.4 MG/DL
PHOSPHATE SERPL-MCNC: 2.7 MG/DL
PLATELET # BLD AUTO: 60 K/UL
PLATELET BLD QL SMEAR: ABNORMAL
PMV BLD AUTO: ABNORMAL FL
POCT GLUCOSE: 103 MG/DL (ref 70–110)
POCT GLUCOSE: 122 MG/DL (ref 70–110)
POCT GLUCOSE: 134 MG/DL (ref 70–110)
POCT GLUCOSE: 145 MG/DL (ref 70–110)
POTASSIUM SERPL-SCNC: 4.6 MMOL/L
POTASSIUM SERPL-SCNC: 4.7 MMOL/L
POTASSIUM SERPL-SCNC: 4.8 MMOL/L
PROT SERPL-MCNC: 3.7 G/DL
PROTHROMBIN TIME: 12.2 SEC
RBC # BLD AUTO: 3.44 M/UL
SODIUM SERPL-SCNC: 141 MMOL/L
TACROLIMUS BLD-MCNC: 5.8 NG/ML
WBC # BLD AUTO: 48.02 K/UL

## 2018-11-22 PROCEDURE — 90945 DIALYSIS ONE EVALUATION: CPT

## 2018-11-22 PROCEDURE — 87077 CULTURE AEROBIC IDENTIFY: CPT

## 2018-11-22 PROCEDURE — 63600175 PHARM REV CODE 636 W HCPCS: Mod: JG | Performed by: INTERNAL MEDICINE

## 2018-11-22 PROCEDURE — 80069 RENAL FUNCTION PANEL: CPT

## 2018-11-22 PROCEDURE — 63600175 PHARM REV CODE 636 W HCPCS: Performed by: SURGERY

## 2018-11-22 PROCEDURE — 25000003 PHARM REV CODE 250: Performed by: HOSPITALIST

## 2018-11-22 PROCEDURE — 99233 SBSQ HOSP IP/OBS HIGH 50: CPT | Mod: ,,, | Performed by: INTERNAL MEDICINE

## 2018-11-22 PROCEDURE — 25000003 PHARM REV CODE 250: Performed by: INTERNAL MEDICINE

## 2018-11-22 PROCEDURE — 99233 PR SUBSEQUENT HOSPITAL CARE,LEVL III: ICD-10-PCS | Mod: 24,,, | Performed by: SURGERY

## 2018-11-22 PROCEDURE — 87070 CULTURE OTHR SPECIMN AEROBIC: CPT

## 2018-11-22 PROCEDURE — 83735 ASSAY OF MAGNESIUM: CPT | Mod: 91

## 2018-11-22 PROCEDURE — 63600175 PHARM REV CODE 636 W HCPCS: Performed by: TRANSPLANT SURGERY

## 2018-11-22 PROCEDURE — 80197 ASSAY OF TACROLIMUS: CPT

## 2018-11-22 PROCEDURE — 63600175 PHARM REV CODE 636 W HCPCS: Performed by: INTERNAL MEDICINE

## 2018-11-22 PROCEDURE — P9047 ALBUMIN (HUMAN), 25%, 50ML: HCPCS | Mod: JG | Performed by: SURGERY

## 2018-11-22 PROCEDURE — 25000003 PHARM REV CODE 250: Performed by: TRANSPLANT SURGERY

## 2018-11-22 PROCEDURE — 85730 THROMBOPLASTIN TIME PARTIAL: CPT

## 2018-11-22 PROCEDURE — 27200188 HC TRANSDUCER, ART ADULT/PEDS

## 2018-11-22 PROCEDURE — 80069 RENAL FUNCTION PANEL: CPT | Mod: 91

## 2018-11-22 PROCEDURE — 99233 PR SUBSEQUENT HOSPITAL CARE,LEVL III: ICD-10-PCS | Mod: ,,, | Performed by: INTERNAL MEDICINE

## 2018-11-22 PROCEDURE — 82248 BILIRUBIN DIRECT: CPT

## 2018-11-22 PROCEDURE — 36620 INSERTION CATHETER ARTERY: CPT

## 2018-11-22 PROCEDURE — 25000003 PHARM REV CODE 250: Performed by: SURGERY

## 2018-11-22 PROCEDURE — 20000000 HC ICU ROOM

## 2018-11-22 PROCEDURE — 99233 SBSQ HOSP IP/OBS HIGH 50: CPT | Mod: 24,,, | Performed by: SURGERY

## 2018-11-22 PROCEDURE — 85610 PROTHROMBIN TIME: CPT

## 2018-11-22 PROCEDURE — 87205 SMEAR GRAM STAIN: CPT

## 2018-11-22 PROCEDURE — S0030 INJECTION, METRONIDAZOLE: HCPCS | Performed by: SURGERY

## 2018-11-22 PROCEDURE — 82977 ASSAY OF GGT: CPT

## 2018-11-22 PROCEDURE — P9045 ALBUMIN (HUMAN), 5%, 250 ML: HCPCS | Mod: JG

## 2018-11-22 PROCEDURE — 83735 ASSAY OF MAGNESIUM: CPT

## 2018-11-22 PROCEDURE — 87186 SC STD MICRODIL/AGAR DIL: CPT

## 2018-11-22 PROCEDURE — 80053 COMPREHEN METABOLIC PANEL: CPT

## 2018-11-22 PROCEDURE — 99232 SBSQ HOSP IP/OBS MODERATE 35: CPT | Mod: ,,, | Performed by: INTERNAL MEDICINE

## 2018-11-22 PROCEDURE — S0028 INJECTION, FAMOTIDINE, 20 MG: HCPCS | Performed by: SURGERY

## 2018-11-22 PROCEDURE — 80100008 HC CRRT DAILY MAINTENANCE

## 2018-11-22 PROCEDURE — 99232 PR SUBSEQUENT HOSPITAL CARE,LEVL II: ICD-10-PCS | Mod: ,,, | Performed by: INTERNAL MEDICINE

## 2018-11-22 PROCEDURE — 94761 N-INVAS EAR/PLS OXIMETRY MLT: CPT

## 2018-11-22 PROCEDURE — 87075 CULTR BACTERIA EXCEPT BLOOD: CPT

## 2018-11-22 PROCEDURE — 25000003 PHARM REV CODE 250

## 2018-11-22 PROCEDURE — 63600175 PHARM REV CODE 636 W HCPCS: Mod: JG

## 2018-11-22 RX ORDER — HYDROCODONE BITARTRATE AND ACETAMINOPHEN 500; 5 MG/1; MG/1
TABLET ORAL CONTINUOUS
Status: ACTIVE | OUTPATIENT
Start: 2018-11-22 | End: 2018-11-23

## 2018-11-22 RX ORDER — ALBUMIN HUMAN 50 G/1000ML
25 SOLUTION INTRAVENOUS ONCE
Status: COMPLETED | OUTPATIENT
Start: 2018-11-22 | End: 2018-11-22

## 2018-11-22 RX ORDER — FAMOTIDINE 10 MG/ML
20 INJECTION INTRAVENOUS 2 TIMES DAILY
Status: DISCONTINUED | OUTPATIENT
Start: 2018-11-22 | End: 2018-11-22

## 2018-11-22 RX ORDER — LIDOCAINE HYDROCHLORIDE 10 MG/ML
INJECTION INFILTRATION; PERINEURAL
Status: COMPLETED
Start: 2018-11-22 | End: 2018-11-22

## 2018-11-22 RX ORDER — MAGNESIUM SULFATE HEPTAHYDRATE 40 MG/ML
2 INJECTION, SOLUTION INTRAVENOUS
Status: DISPENSED | OUTPATIENT
Start: 2018-11-22 | End: 2018-11-23

## 2018-11-22 RX ORDER — ALBUMIN HUMAN 50 G/1000ML
SOLUTION INTRAVENOUS
Status: COMPLETED
Start: 2018-11-22 | End: 2018-11-22

## 2018-11-22 RX ORDER — FAMOTIDINE 10 MG/ML
20 INJECTION INTRAVENOUS 2 TIMES DAILY
Status: DISCONTINUED | OUTPATIENT
Start: 2018-11-22 | End: 2018-11-26

## 2018-11-22 RX ORDER — METRONIDAZOLE 500 MG/100ML
500 INJECTION, SOLUTION INTRAVENOUS
Status: DISCONTINUED | OUTPATIENT
Start: 2018-11-22 | End: 2018-11-26

## 2018-11-22 RX ORDER — ALBUMIN HUMAN 50 G/1000ML
12.5 SOLUTION INTRAVENOUS ONCE
Status: COMPLETED | OUTPATIENT
Start: 2018-11-22 | End: 2018-11-22

## 2018-11-22 RX ORDER — QUETIAPINE FUMARATE 25 MG/1
25 TABLET, FILM COATED ORAL NIGHTLY
Status: DISCONTINUED | OUTPATIENT
Start: 2018-11-22 | End: 2018-11-23

## 2018-11-22 RX ORDER — ALBUMIN HUMAN 250 G/1000ML
25 SOLUTION INTRAVENOUS EVERY 8 HOURS
Status: COMPLETED | OUTPATIENT
Start: 2018-11-22 | End: 2018-11-22

## 2018-11-22 RX ORDER — LIDOCAINE HYDROCHLORIDE 10 MG/ML
1 INJECTION, SOLUTION EPIDURAL; INFILTRATION; INTRACAUDAL; PERINEURAL ONCE
Status: COMPLETED | OUTPATIENT
Start: 2018-11-22 | End: 2018-11-22

## 2018-11-22 RX ADMIN — HYDROCORTISONE SODIUM SUCCINATE 50 MG: 100 INJECTION, POWDER, FOR SOLUTION INTRAMUSCULAR; INTRAVENOUS at 02:11

## 2018-11-22 RX ADMIN — MAGNESIUM SULFATE IN WATER 2 G: 40 INJECTION, SOLUTION INTRAVENOUS at 12:11

## 2018-11-22 RX ADMIN — LIDOCAINE HYDROCHLORIDE 10 MG: 10 INJECTION, SOLUTION EPIDURAL; INFILTRATION; INTRACAUDAL; PERINEURAL at 05:11

## 2018-11-22 RX ADMIN — HEPARIN SODIUM 5000 UNITS: 5000 INJECTION, SOLUTION INTRAVENOUS; SUBCUTANEOUS at 02:11

## 2018-11-22 RX ADMIN — ALBUMIN HUMAN 12.5 G: 50 SOLUTION INTRAVENOUS at 04:11

## 2018-11-22 RX ADMIN — METRONIDAZOLE 500 MG: 500 INJECTION, SOLUTION INTRAVENOUS at 05:11

## 2018-11-22 RX ADMIN — DAPTOMYCIN 980 MG: 500 INJECTION, POWDER, LYOPHILIZED, FOR SOLUTION INTRAVENOUS at 11:11

## 2018-11-22 RX ADMIN — SODIUM CHLORIDE: 0.9 INJECTION, SOLUTION INTRAVENOUS at 01:11

## 2018-11-22 RX ADMIN — GANCICLOVIR SODIUM 419 MG: 500 INJECTION, POWDER, LYOPHILIZED, FOR SOLUTION INTRAVENOUS at 10:11

## 2018-11-22 RX ADMIN — HYDROCORTISONE SODIUM SUCCINATE 50 MG: 100 INJECTION, POWDER, FOR SOLUTION INTRAMUSCULAR; INTRAVENOUS at 09:11

## 2018-11-22 RX ADMIN — CEFEPIME 1 G: 1 INJECTION, POWDER, FOR SOLUTION INTRAMUSCULAR; INTRAVENOUS at 10:11

## 2018-11-22 RX ADMIN — ALBUMIN HUMAN 25 G: 0.25 SOLUTION INTRAVENOUS at 04:11

## 2018-11-22 RX ADMIN — POTASSIUM PHOSPHATE, MONOBASIC AND POTASSIUM PHOSPHATE, DIBASIC 15 MMOL: 224; 236 INJECTION, SOLUTION INTRAVENOUS at 08:11

## 2018-11-22 RX ADMIN — METRONIDAZOLE 500 MG: 500 INJECTION, SOLUTION INTRAVENOUS at 09:11

## 2018-11-22 RX ADMIN — HEPARIN SODIUM 5000 UNITS: 5000 INJECTION, SOLUTION INTRAVENOUS; SUBCUTANEOUS at 06:11

## 2018-11-22 RX ADMIN — CEFEPIME 1 G: 1 INJECTION, POWDER, FOR SOLUTION INTRAMUSCULAR; INTRAVENOUS at 09:11

## 2018-11-22 RX ADMIN — ISAVUCONAZONIUM SULFATE 372 MG: 74.4 INJECTION, POWDER, LYOPHILIZED, FOR SOLUTION INTRAVENOUS at 10:11

## 2018-11-22 RX ADMIN — FAMOTIDINE 20 MG: 10 INJECTION, SOLUTION INTRAVENOUS at 09:11

## 2018-11-22 RX ADMIN — HYDROCORTISONE SODIUM SUCCINATE 25 MG: 100 INJECTION, POWDER, FOR SOLUTION INTRAMUSCULAR; INTRAVENOUS at 06:11

## 2018-11-22 RX ADMIN — HEPARIN SODIUM 5000 UNITS: 5000 INJECTION, SOLUTION INTRAVENOUS; SUBCUTANEOUS at 09:11

## 2018-11-22 RX ADMIN — FAMOTIDINE 20 MG: 10 INJECTION, SOLUTION INTRAVENOUS at 08:11

## 2018-11-22 RX ADMIN — ALBUMIN HUMAN 25 G: 0.25 SOLUTION INTRAVENOUS at 11:11

## 2018-11-22 RX ADMIN — ALBUMIN HUMAN 25 G: 0.25 SOLUTION INTRAVENOUS at 08:11

## 2018-11-22 RX ADMIN — QUETIAPINE FUMARATE 25 MG: 25 TABLET ORAL at 08:11

## 2018-11-22 RX ADMIN — LIDOCAINE HYDROCHLORIDE 1 ML: 10 INJECTION, SOLUTION INFILTRATION; PERINEURAL at 04:11

## 2018-11-22 RX ADMIN — ALBUMIN HUMAN 25 G: 50 SOLUTION INTRAVENOUS at 01:11

## 2018-11-22 RX ADMIN — ALBUMIN HUMAN 12.5 G: 0.05 INJECTION, SOLUTION INTRAVENOUS at 04:11

## 2018-11-22 RX ADMIN — ALBUMIN HUMAN 25 G: 0.05 INJECTION, SOLUTION INTRAVENOUS at 01:11

## 2018-11-22 RX ADMIN — MAGNESIUM SULFATE IN WATER 2 G: 40 INJECTION, SOLUTION INTRAVENOUS at 11:11

## 2018-11-22 NOTE — SUBJECTIVE & OBJECTIVE
Interval History: AMS somewhat improved in that he is now verbal but remains confused. Afebrile. Leukocytosis remains dramatic and uptrending. No growth from repeat blood cx. CT head w/o recent CVA, possible MRI pending.    Review of Systems   Unable to perform ROS: Mental status change     Objective:     Vital Signs (Most Recent):  Temp: 98.2 °F (36.8 °C) (11/22/18 0700)  Pulse: 98 (11/22/18 1015)  Resp: (!) 23 (11/22/18 1015)  BP: 102/61 (11/22/18 1015)  SpO2: 100 % (11/22/18 1015) Vital Signs (24h Range):  Temp:  [98 °F (36.7 °C)-98.5 °F (36.9 °C)] 98.2 °F (36.8 °C)  Pulse:  [] 98  Resp:  [20-47] 23  SpO2:  [92 %-100 %] 100 %  BP: ()/(55-90) 102/61  Arterial Line BP: ()/(48-74) 106/53     Weight: 96.3 kg (212 lb 4.9 oz)  Body mass index is 30.46 kg/m².    Estimated Creatinine Clearance: 142.1 mL/min (based on SCr of 0.7 mg/dL).    Physical Exam   Constitutional: He appears well-developed. No distress.   HENT:   Head: Atraumatic.   Mouth/Throat: Oropharynx is clear and moist. No oropharyngeal exudate.   Eyes: Conjunctivae and EOM are normal. Pupils are equal, round, and reactive to light. Scleral icterus is present.   Neck: Neck supple.   Cardiovascular: Normal rate and regular rhythm. Exam reveals no friction rub.   No murmur heard.  Pulmonary/Chest: Breath sounds normal. No respiratory distress. He has no wheezes. He has no rales. He exhibits no tenderness.   Abdominal: Soft. Bowel sounds are normal. He exhibits distension. There is tenderness. There is no rebound and no guarding.   Brownish SS fluid in JOSE A. Chevron dressed.   Musculoskeletal: Normal range of motion. He exhibits edema.   Lymphadenopathy:     He has no cervical adenopathy.   Neurological: He is alert.   Skin: No rash noted. No erythema.       Significant Labs:   CBC:   Recent Labs   Lab 11/21/18 0610 11/22/18  0310   WBC 35.02* 48.02*   HGB 10.0* 10.1*   HCT 30.6* 31.2*   PLT 50* 60*     CMP:   Recent Labs   Lab 11/21/18 0610  11/21/18  1320 11/21/18  2146 11/22/18  0310     139  139 137 140  140 141  141  141   K 4.4  4.4  4.4 4.7 4.9  4.9 4.8  4.8  4.8     108  108 106 111*  111* 112*  112*  112*   CO2 24  24  24 23 23  23 19*  19*  19*   GLU 91  91  91 136* 134*  134* 125*  125*  125*   BUN 13  13  13 21* 13  13 11  11  11   CREATININE 0.8  0.8  0.8 1.1 0.8  0.8 0.7  0.7  0.7   CALCIUM 7.6*  7.6*  7.6* 7.8* 7.4*  7.4* 7.7*  7.7*  7.7*   PROT 3.7*  --   --  3.7*   ALBUMIN 1.8*  1.8*  1.8* 1.7* 1.7*  1.7* 1.7*  1.7*  1.7*   BILITOT 13.4*  --   --  11.9*   ALKPHOS 233*  --   --  290*   AST 29  --   --  41*   ALT 54*  --   --  66*   ANIONGAP 7*  7*  7* 8 6*  6* 10  10  10   EGFRNONAA >60.0  >60.0  >60.0 >60.0 >60.0  >60.0 >60.0  >60.0  >60.0       Significant Imaging: I have reviewed all pertinent imaging results/findings within the past 24 hours.

## 2018-11-22 NOTE — PROCEDURES
Arterial Line Placement Procedure Note  Procedure - Arterial Line Placement  Resident - JAMES Thomas MD -Resident    Patient's right wrist was prepped and draped in usual sterile fashion. 1cc of Lidocaine 1% was  used to anesthetize the area. An 20g Arrow arterial line was introduced into the right radial artery on the first attempt. Catheter threaded and the needle was removed with appropriate blood return. Blood loss was minimal. Patient tolerated the procedure well, and there were no complications.     JAMES Thomas MD   General Surgery- PGYII  [phone]

## 2018-11-22 NOTE — CONSULTS
See H&P earlier today.    Keven Carmichael M.D.  Diagnostic and Interventional Radiologist  Department of Radiology  Pager: [phone]

## 2018-11-22 NOTE — CONSULTS
[facility]  Vascular Neurology  Comprehensive Stroke Center  Consult Note    Consults  Assessment/Plan:     Patient is a 53 y.o. year old male with:    * Acute encephalopathy    - Pt with liver failure received liver transplant. Stroke code activation 2/2 pt being non responsive   - CTH not evident of acute large ischemic changes   - pt was following commands and moving all 4 ext   - if patient not back to baseline, would rec MRI brain            STROKE DOCUMENTATION     Acute Stroke Times   Last Known Normal Date: 11/21/18  Last Known Normal Time: 1205  Symptom Onset Date: 11/21/18  Symptom Onset Time: 1205  Stroke Team Called Date: 11/21/18  Stroke Team Called Time: 1210  Stroke Team Arrival Date: 11/21/18  Stroke Team Arrival Time: 1215  CT Interpretation Time: 1230    NIH Scale:  Interval: baseline (upon arrival/admit)  1a. Level Of Consciousness: 1-->Not alert: but arousable by minor stimulation to obey, answer, or respond  1b. LOC Questions: 1-->Answers one question correctly  1c. LOC Commands: 0-->Performs both tasks correctly  2. Best Gaze: 0-->Normal  3. Visual: 0-->No visual loss  4. Facial Palsy: 0-->Normal symmetrical movements  5a. Motor Arm, Left: 2-->Some effort against gravity: limb cannot get to or maintain (if cued) 90 (or 45) degrees, drifts down to bed, but has some effort against gravity  5b. Motor Arm, Right: 2-->Some effort against gravity: limb cannot get to or maintain (if cued) 90 (or 45) degrees, drifts down to bed, but has some effort against gravity  6a. Motor Leg, Left: 3-->No effort against gravity: leg falls to bed immediately  6b. Motor Leg, Right: 3-->No effort against gravity: leg falls to bed immediately  7. Limb Ataxia: 0-->Absent  8. Sensory: 0-->Normal: no sensory loss  9. Best Language: 1-->Mild-to-moderate aphasia: some obvious loss of fluency or facility of comprehension, without significant limitation on ideas expressed or form of expression. Reduction  of speech and/or comprehension, however, makes conversation. . . (see row details)  10. Dysarthria: 2-->Severe dysarthria: patients speech is so slurred as to be unintelligible in the absence of or out of proportion to any dysphasia, or is mute/anarthric  11. Extinction and Inattention (formerly Neglect): 0-->No abnormality  Total (NIH Stroke Scale): 15    Modified [place] Score: 5  [place] Coma Scale:14   ABCD2 Score:    [id number] Score:   HAS -BLED Score:   ICH Score:   Hunt & Torres Classification:       Thrombolysis Candidate? No, Strong suspicion for stroke mimic or alternative diagnosis       Interventional Revascularization Candidate?   Is the patient eligible for mechanical endovascular reperfusion (JEANINE)?  Stroke mimic      Hemorrhagic change of an Ischemic Stroke: Does this patient have an ischemic stroke with hemorrhagic changes? No     Subjective:     History of Present Illness:  Mr Enciso is a 52 y/o CM with PMHx of Liver failure treated with liver transplant, has been in the SICU post transplant, EtOH dependence, coagulopathy, Anemia of chronic disease, Thrombocytopenia, Acute renal injury.     Vascular Neurology was consulted and stroke code was activated since patient became unresponsive. Pt wasa at baseline communicating and moving all 4 extremities. His central line was taken out and then became unresponsive. He was on reverse trendelenburg position for the procedure. By the time team arrived at bedside, pt was able to move bilateral lower limbs and left upper ext. He was not able to move right UEx which he did on CT scanner. He was following commands. He also had blinking on threat in both eyes.         Past Medical History:   Diagnosis Date    Liver cirrhosis, alcoholic 09/30/2018     Past Surgical History:   Procedure Laterality Date    EGD (ESOPHAGOGASTRODUODENOSCOPY) N/A 11/6/2018    Performed by Duarte Jules MD at [facility]O (2ND FLR)    ESOPHAGOGASTRODUODENOSCOPY N/A 11/6/2018    Procedure:  EGD (ESOPHAGOGASTRODUODENOSCOPY);  Surgeon: Duarte Jules MD;  Location: [facility] (2ND FLR);  Service: Endoscopy;  Laterality: N/A;    EXPLORATORY LAPAROTOMY AFTER LIVER TRANSPLANTATION N/A 11/16/2018    Procedure: LAPAROTOMY, EXPLORATORY, AFTER LIVER TRANSPLANT;  Surgeon: Amadou Lester Jr., MD;  [facility];  Service: Transplant;  Laterality: N/A;    EXPLORATORY LAPAROTOMY AFTER LIVER TRANSPLANTATION N/A 11/18/2018    Procedure: LAPAROTOMY, EXPLORATORY, AFTER LIVER TRANSPLANT, LIKELY  ABDOMINAL CLOSURE;  Surgeon: Amadou Lester Jr., MD;  [facility];  Service: Transplant;  Laterality: N/A;    INSERTION OF TUNNELED CENTRAL VENOUS HEMODIALYSIS CATHETER N/A 11/7/2018    Procedure: INSERTION, CATHETER, CENTRAL VENOUS, HEMODIALYSIS, TUNNELED;  Surgeon: Brock Gonzalez MD;  Location: [facility] CATH LAB;  Service: Nephrology;  Laterality: N/A;    INSERTION, CATHETER, CENTRAL VENOUS, HEMODIALYSIS, TUNNELED N/A 11/7/2018    Performed by Brock Gonzalez MD at [facility] CATH LAB    LAPAROTOMY, EXPLORATORY N/A 11/16/2018    Procedure: LAPAROTOMY, EXPLORATORY;  Surgeon: Amadou Lester Jr., MD;  Location: [facility] OR [address] [place] [address];  Service: Transplant;  Laterality: N/A;    LAPAROTOMY, EXPLORATORY N/A 11/16/2018    Performed by Amadou Lester Jr., MD at [facility] OR [facility] FLR    LAPAROTOMY, EXPLORATORY, AFTER LIVER TRANSPLANT N/A 11/16/2018    Per[facility]    LAPAROTOMY, EXPLORATORY, AFTER LIVER TRANSPLANT, LIKELY  ABDOMINAL CLOSURE N/A 11/18/2018    Performed by Amadou Lester Jr., MD at [facility] OR 2ND FLR    LIVER TRANSPLANT N/A 11/11/2018    Procedure: TRANSPLANT, LIVER;  Surgeon: Shmuel Leary MD;  [facility];  Service: Transplant;  Laterality: N/A;    TRANSPLANT, LIVER N/A 11/11/2018    Performed by Shmuel Leary MD at [facility] OR [address] [place] [address]     History reviewed. No pertinent family history.  Social History     Tobacco  Use    Smoking status: Never Smoker   Substance Use Topics    Alcohol use: Not on file    Drug use: Not on file     Review of patient's allergies indicates:   Allergen Reactions    Penicillins Nausea And Vomiting and Rash     Full body rash from cefepime as well       Medications: I have reviewed the current medication administration record.    Medications Prior to Admission   Medication Sig Dispense Refill Last Dose    calcium carbonate/vitamin D3 (VITAMIN D-3 ORAL) Take 1 tablet by mouth once daily.       cyanocobalamin (VITAMIN B-12) 100 MCG tablet Take 100 mcg by mouth once daily.       folic acid (FOLVITE) 1 MG tablet Take 1 mg by mouth once daily.       lactulose (CHRONULAC) 10 gram/15 mL solution Take 20 g by mouth 3 (three) times daily.       multivitamin (THERAGRAN) per tablet Take 1 tablet by mouth once daily.       omeprazole (PRILOSEC) 40 MG capsule Take 40 mg by mouth once daily.       ondansetron (ZOFRAN) 4 MG tablet Take 4 mg by mouth daily as needed for Nausea.       rifAXIMin (XIFAXAN) 550 mg Tab Take 550 mg by mouth 2 (two) times daily.          Review of Systems   Constitutional: Positive for activity change, appetite change, diaphoresis and fatigue.   HENT: Negative for ear discharge and ear pain.    Eyes: Negative for discharge and itching.   Respiratory: Negative for chest tightness.    Genitourinary: Negative for flank pain.   Neurological: Positive for speech difficulty and weakness.     Objective:     Vital Signs (Most Recent):  Temp: 98.5 °F (36.9 °C) (11/21/18 2300)  Pulse: 108 (11/22/18 0015)  Resp: (!) 22 (11/22/18 0015)  BP: 100/61 (11/22/18 0015)  SpO2: 97 % (11/22/18 0015)    Vital Signs Range (Last 24H):  Temp:  [97.8 °F (36.6 °C)-98.5 °F (36.9 °C)]   Pulse:  []   Resp:  [12-47]   BP: ()/(55-90)   SpO2:  [95 %-100 %]   Arterial Line BP: ()/(48-74)     Physical Exam    Neurological Exam:     MSE - Pt lethargic, wakes up to repeated stimuli, follows  commands   CN - Pupils reactive to light bilaterally, eomi intact, blins to threat bilaterally, no facial droop   Strength - at least 3 / 5 bilateral UEx and 2 / 5 bilateral lower ext   Sensory -withdraws to pain       Laboratory:  CMP:   Recent Labs   Lab 11/21/18 0610 11/21/18  2146   CALCIUM 7.6*  7.6*  7.6*   < > 7.4*  7.4*   ALBUMIN 1.8*  1.8*  1.8*   < > 1.7*  1.7*   PROT 3.7*  --   --      139  139   < > 140  140   K 4.4  4.4  4.4   < > 4.9  4.9   CO2 24  24  24   < > 23  23     108  108   < > 111*  111*   BUN 13  13  13   < > 13  13   CREATININE 0.8  0.8  0.8   < > 0.8  0.8   ALKPHOS 233*  --   --    ALT 54*  --   --    AST 29  --   --    BILITOT 13.4*  --   --     < > = values in this interval not displayed.     CBC:   Recent Labs   Lab 11/21/18 0610   WBC 35.02*   RBC 3.35*   HGB 10.0*   HCT 30.6*   PLT 50*   MCV 91   MCH 29.9   MCHC 32.7     Lipid Panel: No results for input(s): CHOL, LDLCALC, HDL, TRIG in the last 168 hours.  Coagulation:   Recent Labs   Lab 11/21/18 0610   INR 1.3*   APTT 38.4*     Hgb A1C: No results for input(s): HGBA1C in the last 168 hours.  TSH: No results for input(s): TSH in the last 168 hours.    Diagnostic Results:      Brain imaging:        Vessel Imaging:    None    Cardiac Evaluation:     EKG - Sinus tachycardia       Stan Villasenor MD  [facility]  Department of Vascular Neurology   [facility]

## 2018-11-22 NOTE — SUBJECTIVE & OBJECTIVE
Interval History/Significant Events:     CVL removed overnight. Pt became unresponsive and stroke code was activated. CTH was negative, pt PAINTING on vascular neuro exam. Pt combative and agitated overnight; restraints applied.     Follow-up For: Procedure(s) (LRB):  LAPAROTOMY, EXPLORATORY, AFTER LIVER TRANSPLANT, LIKELY  ABDOMINAL CLOSURE (N/A)    Post-Operative Day: 4 Days Post-Op    Objective:     Vital Signs (Most Recent):  Temp: 98 °F (36.7 °C) (11/22/18 0300)  Pulse: 105 (11/22/18 0600)  Resp: (!) 24 (11/22/18 0600)  BP: 113/70 (11/22/18 0600)  SpO2: 100 % (11/22/18 0600) Vital Signs (24h Range):  Temp:  [97.8 °F (36.6 °C)-98.5 °F (36.9 °C)] 98 °F (36.7 °C)  Pulse:  [] 105  Resp:  [20-47] 24  SpO2:  [92 %-100 %] 100 %  BP: ()/(55-90) 113/70  Arterial Line BP: ()/(48-74) 119/60     Weight: 96.3 kg (212 lb 4.9 oz)  Body mass index is 30.46 kg/m².      Intake/Output Summary (Last 24 hours) at 11/22/2018 0645  Last data filed at 11/22/2018 0600  Gross per 24 hour   Intake 3900 ml   Output 5478 ml   Net -1578 ml       Physical Exam     Constitutional: He appears well-developed.   jaundiced   HENT:   Head: Normocephalic and atraumatic.   Eyes: Scleral icterus is present.   Cardiovascular: Normal rate, regular rhythm and intact distal pulses.   Pulmonary/Chest: Effort normal. No respiratory distress.   Sats > 95 on NC   Abdominal: Soft. He exhibits no distension.   Incision closed wityh staples, C/D/I  JOSE A drains in place with serous output.   Musculoskeletal: He exhibits edema.   Neurological:   AAOx3  Skin: Skin is warm and dry.   Jaundice improving     Lines/Drains/Airways     Central Venous Catheter Line                 Tunneled Central Line Insertion/Assessment - Double Lumen  11/07/18 1350 right internal jugular 14 days          Drain                 Closed/Suction Drain 11/16/18 1127 Right;Anterior RLQ Bulb 19 Fr. 5 days          Arterial Line                 Arterial Line 11/11/18 0159 Left  Radial 11 days          Peripheral Intravenous Line                 Peripheral IV - Single Lumen 11/21/18 1100 Anterior;Left Forearm less than 1 day         Peripheral IV - Single Lumen 11/21/18 1100 Right Antecubital less than 1 day                Significant Labs:    CBC/Anemia Profile:  Recent Labs   Lab 11/20/18  0746 11/21/18  0610 11/22/18  0310   WBC 32.33* 35.02* 48.02*   HGB 10.1* 10.0* 10.1*   HCT 29.9* 30.6* 31.2*   PLT 69* 50* 60*   MCV 89 91 91   RDW 17.6* 18.4* 18.5*        Chemistries:  Recent Labs   Lab 11/20/18  0746  11/21/18  0610 11/21/18  1320 11/21/18  2146 11/22/18  0310      < > 139  139  139 137 140  140 141  141  141   K 4.2   < > 4.4  4.4  4.4 4.7 4.9  4.9 4.8  4.8  4.8      < > 108  108  108 106 111*  111* 112*  112*  112*   CO2 23   < > 24  24  24 23 23  23 19*  19*  19*   BUN 12   < > 13  13  13 21* 13  13 11  11  11   CREATININE 0.8   < > 0.8  0.8  0.8 1.1 0.8  0.8 0.7  0.7  0.7   CALCIUM 7.4*   < > 7.6*  7.6*  7.6* 7.8* 7.4*  7.4* 7.7*  7.7*  7.7*   ALBUMIN 2.0*   < > 1.8*  1.8*  1.8* 1.7* 1.7*  1.7* 1.7*  1.7*  1.7*   PROT 3.7*  --  3.7*  --   --  3.7*   BILITOT 13.6*  --  13.4*  --   --  11.9*   ALKPHOS 203*  --  233*  --   --  290*   ALT 56*  --  54*  --   --  66*   AST 27  --  29  --   --  41*   MG  --    < > 2.1 1.7 1.6  1.6 2.1  2.1   PHOS  --    < > 3.0  3.0  3.0 4.3 3.1  3.1 2.7  2.7  2.7    < > = values in this interval not displayed.       All pertinent labs within the past 24 hours have been reviewed.    Significant Imaging:  I have reviewed all pertinent imaging results/findings within the past 24 hours.

## 2018-11-22 NOTE — PROGRESS NOTES
[facility]  Liver Transplant  Progress Note    Patient Name: Femi Enciso  MRN: [id number]  Admission Date: 10/27/2018  Hospital Length of Stay: 26 days  Code Status: Full Code  Primary Care Provider: Primary Doctor No  Post-Op Day 4 Days Post-Op    ORGAN:   LIVER  Disease Etiology: Alcoholic Cirrhosis  Donor Type:    - Brain Death  CDC High Risk:   No  Donor CMV Status:   Donor CMV Status: Positive  Donor HBcAB:   Negative  Donor HCV Status:   Negative  Donor HBV JAVIER: Negative  Donor HCV JAVIER: Negative  Whole or Partial: Whole Liver  Biliary Anastomosis: End to End  Arterial Anatomy: Standard    Subjective:     No acute events overnight, afebrile,vitals stable. Concern for stroke vs. PE vs. air embolus when R IJ line was pulled yesterday. CT head and chest negative for stroke or PE, patient's vital signs improved but mentation remains inappropriate, disoriented.     Scheduled Meds:   albumin human 25%  25 g Intravenous Q8H    ceFEPime (MAXIPIME) IVPB  1 g Intravenous Q12H    DAPTOmycin (CUBICIN)  IV  10 mg/kg Intravenous Q24H    famotidine (PF)  20 mg Intravenous BID    ganciclovir (CYTOVENE) IVPB  5 mg/kg (Dosing Weight) Intravenous Q24H    heparin (porcine)  5,000 Units Subcutaneous Q8H    hydrocortisone sodium succinate  50 mg Intravenous Q8H    custom IVPB builder  372 mg Intravenous Q24H    metronidazole  500 mg Intravenous Q8H    QUEtiapine  25 mg Oral QHS     Continuous Infusions:   sodium chloride 0.9%      sodium chloride 0.9%       PRN Meds:dextrose 50%, dextrose 50%, glucagon (human recombinant), glucose, glucose, HYDROmorphone, insulin aspart U-100, magnesium sulfate IVPB, magnesium sulfate IVPB, oxyCODONE, oxyCODONE, potassium phosphate IVPB, ramelteon    Review of Systems   Constitutional: Negative for activity change, appetite change, chills, fatigue and fever.   HENT: Negative for congestion, ear pain, rhinorrhea and sore throat.    Eyes: Negative for pain,  discharge and visual disturbance.   Respiratory: Negative for cough, chest tightness and shortness of breath.    Cardiovascular: Negative for chest pain and palpitations.   Gastrointestinal: Negative for abdominal distention, abdominal pain, blood in stool, constipation, diarrhea, nausea and vomiting.   Genitourinary: Negative for difficulty urinating.   Musculoskeletal: Negative for back pain and neck pain.   Skin: Negative for color change and rash.   Neurological: Negative for dizziness, numbness and headaches.   Hematological: Negative for adenopathy.   Psychiatric/Behavioral: Positive for behavioral problems and confusion.   All other systems reviewed and are negative.    Objective:     Vital Signs (Most Recent):  Temp: 98 °F (36.7 °C) (11/22/18 1100)  Pulse: 95 (11/22/18 1315)  Resp: (!) 24 (11/22/18 1315)  BP: (!) 87/50 (11/22/18 1300)  SpO2: 100 % (11/22/18 1315) Vital Signs (24h Range):  Temp:  [98 °F (36.7 °C)-98.5 °F (36.9 °C)] 98 °F (36.7 °C)  Pulse:  [] 95  Resp:  [16-37] 24  SpO2:  [92 %-100 %] 100 %  BP: ()/(50-81) 87/50  Arterial Line BP: ()/(47-74) 96/47     Weight: 96.3 kg (212 lb 4.9 oz)  Body mass index is 30.46 kg/m².    Intake/Output - Last 3 Shifts       11/20 0700 - 11/21 0659 11/21 0700 - 11/22 0659 11/22 0700 - 11/23 0659    P.O.  240     I.V. (mL/kg) 3553 (36.2) 3610 (37.5)     IV Piggyback  50     TPN 1125.4      Total Intake(mL/kg) 4678.4 (47.7) 3900 (40.5)     Urine (mL/kg/hr)       Drains 630 1140 420    Other 3390 4337 1143    Stool 4 1     Total Output 4024 5478 1563    Net +654.4 -1578 -1563           Stool Occurrence 2 x 1 x           Physical Exam   Constitutional: He is oriented to person, place, and time. He appears well-developed.   jaundiced   HENT:   Head: Normocephalic and atraumatic.   Eyes: Scleral icterus is present.   Cardiovascular: Normal rate, regular rhythm and intact distal pulses.   Pulmonary/Chest: Effort normal. No respiratory distress.   Nasal  canula    Abdominal: Soft. He exhibits no distension.   Right sided JOSE A drain with dark serosanguinous output  Incision with clean bandage in place   Musculoskeletal: He exhibits edema.   Neurological: He is alert and oriented to person, place, and time.   Skin: Skin is warm and dry.   Jaundice improving       Laboratory:  Immunosuppressants     None        Labs within the past 24 hours have been reviewed.    Diagnostic Results:  I have personally reviewed all pertinent imaging studies.    Assessment/Plan:   Femi Enciso is a 53 y.o. male     GI   S/P liver transplant      53 year old male with history of ESLD 2/2 ETOH cirrhosis who is s/p liver transplant c/b take-back x 3 for bleeding most recently 11/18    Neuro  -acute change in mental status with confusion and agitation on 11/14, controlled with PRN ativan   -monitor neuro exam - alert but no longer responding appropriately to questioning, agitation overnight, calm when restrained; continue to monitor   - CT head 11/21 negative for acute infarct, will plan to re-scan and evaluate tomorrow vs. Saturday if neuro status fails to improve    CV  -continues to be HDS off pressors  -swan tricia catheter removed  -monitor on telemetry     Resp  -Extubated, on room air  -ABGs & CXR prn  -continue to monitor O2 sats   -CXR stable      Heme  -H/H stable overnight   -INR 1.2, continue to monitor   -daily CBCs, transfuse as needed     ID  -monitor fever and WBC, WBC increased to 48 today, continues to increase rapidly  - afebrile   -f/u cultures: enterococcus faecium - sensitive to linezolid and daptomycin   -Abx: Cipro, fluconazole and daptomycin (switched from linezolid yesterday)     MSK  -PT/OT  -Encourage OOBTC    FEN/GI  -replace lytes   -Renal diet - NPO while disoriented   -s/p ex-lap 11/16 requiring take-back for bleeding, left open, now s/p closure 11/18  -CT chest with evidence of hematoma vs. Abscess in close proximity to the liver - will ask IR to place drain in  collection and send for culture today - if WBC does not decrease following intervention may need additional CT abdomen to rule out additional source      Endocrine  -insulin as needed. Monitor BG       -CRRT per renal    dispo  -continue SICU care in setting of increasing WBC and change in mental status       The patients clinical status was discussed at multidisplinary rounds, involving transplant surgery, transplant medicine, pharmacy, nursing, nutrition, and social work.    Crys Flores MD  Liver Transplant  [facility]

## 2018-11-22 NOTE — SUBJECTIVE & OBJECTIVE
No acute events overnight, afebrile,vitals stable. Concern for stroke vs. PE vs. air embolus when R IJ line was pulled yesterday. CT head and chest negative for stroke or PE, patient's vital signs improved but mentation remains inappropriate, disoriented.     Scheduled Meds:   albumin human 25%  25 g Intravenous Q8H    ceFEPime (MAXIPIME) IVPB  1 g Intravenous Q12H    DAPTOmycin (CUBICIN)  IV  10 mg/kg Intravenous Q24H    famotidine (PF)  20 mg Intravenous BID    ganciclovir (CYTOVENE) IVPB  5 mg/kg (Dosing Weight) Intravenous Q24H    heparin (porcine)  5,000 Units Subcutaneous Q8H    hydrocortisone sodium succinate  50 mg Intravenous Q8H    custom IVPB builder  372 mg Intravenous Q24H    metronidazole  500 mg Intravenous Q8H    QUEtiapine  25 mg Oral QHS     Continuous Infusions:   sodium chloride 0.9%      sodium chloride 0.9%       PRN Meds:dextrose 50%, dextrose 50%, glucagon (human recombinant), glucose, glucose, HYDROmorphone, insulin aspart U-100, magnesium sulfate IVPB, magnesium sulfate IVPB, oxyCODONE, oxyCODONE, potassium phosphate IVPB, ramelteon    Review of Systems   Constitutional: Negative for activity change, appetite change, chills, fatigue and fever.   HENT: Negative for congestion, ear pain, rhinorrhea and sore throat.    Eyes: Negative for pain, discharge and visual disturbance.   Respiratory: Negative for cough, chest tightness and shortness of breath.    Cardiovascular: Negative for chest pain and palpitations.   Gastrointestinal: Negative for abdominal distention, abdominal pain, blood in stool, constipation, diarrhea, nausea and vomiting.   Genitourinary: Negative for difficulty urinating.   Musculoskeletal: Negative for back pain and neck pain.   Skin: Negative for color change and rash.   Neurological: Negative for dizziness, numbness and headaches.   Hematological: Negative for adenopathy.   Psychiatric/Behavioral: Positive for behavioral problems and confusion.   All other  systems reviewed and are negative.    Objective:     Vital Signs (Most Recent):  Temp: 98 °F (36.7 °C) (11/22/18 1100)  Pulse: 95 (11/22/18 1315)  Resp: (!) 24 (11/22/18 1315)  BP: (!) 87/50 (11/22/18 1300)  SpO2: 100 % (11/22/18 1315) Vital Signs (24h Range):  Temp:  [98 °F (36.7 °C)-98.5 °F (36.9 °C)] 98 °F (36.7 °C)  Pulse:  [] 95  Resp:  [16-37] 24  SpO2:  [92 %-100 %] 100 %  BP: ()/(50-81) 87/50  Arterial Line BP: ()/(47-74) 96/47     Weight: 96.3 kg (212 lb 4.9 oz)  Body mass index is 30.46 kg/m².    Intake/Output - Last 3 Shifts       11/20 0700 - 11/21 0659 11/21 0700 - 11/22 0659 11/22 0700 - 11/23 0659    P.O.  240     I.V. (mL/kg) 3553 (36.2) 3610 (37.5)     IV Piggyback  50     TPN 1125.4      Total Intake(mL/kg) 4678.4 (47.7) 3900 (40.5)     Urine (mL/kg/hr)       Drains 630 1140 420    Other 3390 4337 1143    Stool 4 1     Total Output 4024 5478 1563    Net +654.4 -1578 -1563           Stool Occurrence 2 x 1 x           Physical Exam   Constitutional: He is oriented to person, place, and time. He appears well-developed.   jaundiced   HENT:   Head: Normocephalic and atraumatic.   Eyes: Scleral icterus is present.   Cardiovascular: Normal rate, regular rhythm and intact distal pulses.   Pulmonary/Chest: Effort normal. No respiratory distress.   Nasal canula    Abdominal: Soft. He exhibits no distension.   Right sided JOSE A drain with dark serosanguinous output  Incision with clean bandage in place   Musculoskeletal: He exhibits edema.   Neurological: He is alert and oriented to person, place, and time.   Skin: Skin is warm and dry.   Jaundice improving       Laboratory:  Immunosuppressants     None        Labs within the past 24 hours have been reviewed.    Diagnostic Results:  I have personally reviewed all pertinent imaging studies.

## 2018-11-22 NOTE — PROGRESS NOTES
Dr. Yepez notified of pt's SBP in mid 80s.  HR: 113.  Orders given for 250cc of albumin.  Will continue to monitor.

## 2018-11-22 NOTE — ASSESSMENT & PLAN NOTE
54yo man w/a history of DM2 and alcohol dependence with associated hepatitis who was admitted on 10/27/2018 with acute liver failure (c/b PSE, HRS, hepatic hydrothorax) and ultimately underwent liver transplantation (s/p DBDLT 11/11/2018, CMV D+/R-, steroid induction, on maintenance tacro/MMF/pred; c/b post-operative delirium, VRE bacteruria, and IA hemorrhage requiring re-do ex-lap, RP/retrorenal/retrocolic hematoma evacuation, cauterization of RP bleed, partial right adrenalectomy, and temporary vac abdominal closure on 11/16/2018 and planned benign second look washout on 11/18/2018). Patient's bleeding has sabilized since cauterization but OR cultures have grown VRE.faecium confirming that this hematoma was infected. He decompensated on 11/21 with acute AMS concerning for CVA clinically vs toxic/metabolic induced delirium with an ongoing leukocytosis despite appropriate coverage per cultures.    - would continue daptomycin as VRE isolate is sensitive and this agent will be better tolerated long term (no expected significant myelosuppression as with linezolid); weekly CPK needed and contact precautions ordered  - would continue empiric cefepime/flagyl given persistent leukocytosis for coverage of IA ty  - await pending repeat blood cultures given leukocytosis/AMS   - would continue posaconazole for mold prophylaxis per institutional protocol  - remainder of prophylaxis per protocol  - will f/u pending operative cultures and pending imaging studies to tailor therapy

## 2018-11-22 NOTE — ASSESSMENT & PLAN NOTE
Avoid insulin stacking and hypoglycemia.  CRRT per nephrology  Lab Results   Component Value Date    CREATININE 1.1 11/21/2018

## 2018-11-22 NOTE — ASSESSMENT & PLAN NOTE
53 year old male with history of ESLD 2/2 ETOH cirrhosis who is s/p liver transplant c/b take-back x 3 for bleeding most recently 11/18    Neuro  -acute change in mental status with confusion and agitation on 11/14, controlled with PRN ativan   -monitor neuro exam - alert but no longer responding appropriately to questioning, agitation overnight, calm when restrained; continue to monitor   - CT head 11/21 negative for acute infarct, will plan to re-scan and evaluate tomorrow vs. Saturday if neuro status fails to improve    CV  -continues to be HDS off pressors  -swan tricia catheter removed  -monitor on telemetry     Resp  -Extubated, on room air  -ABGs & CXR prn  -continue to monitor O2 sats   -CXR stable      Heme  -H/H stable overnight   -INR 1.2, continue to monitor   -daily CBCs, transfuse as needed     ID  -monitor fever and WBC, WBC increased to 48 today, continues to increase rapidly  - afebrile   -f/u cultures: enterococcus faecium - sensitive to linezolid and daptomycin   -Abx: Cipro, fluconazole and daptomycin (switched from linezolid yesterday)     MSK  -PT/OT  -Encourage OOBTC    FEN/GI  -replace lytes   -Renal diet - NPO while disoriented   -s/p ex-lap 11/16 requiring take-back for bleeding, left open, now s/p closure 11/18  -CT chest with evidence of hematoma vs. Abscess in close proximity to the liver - will ask IR to place drain in collection and send for culture today - if WBC does not decrease following intervention may need additional CT abdomen to rule out additional source      Endocrine  -insulin as needed. Monitor BG       -CRRT per renal    dispo  -continue SICU care in setting of increasing WBC and change in mental status

## 2018-11-22 NOTE — CARE UPDATE
BG goal 140-180. BG at goal without insulin.       BG monitoring every ac/hs and moderate dose correction scale.       ** Please call Endocrine for any BG related issues **

## 2018-11-22 NOTE — ASSESSMENT & PLAN NOTE
Assessment/Plan:   Femi Enciso is a 53 y.o. male now s/p OLT         * S/P liver transplant        53 year old male with history of ESLD 2/2 ETOH cirrhosis who is s/p liver transplant 11/11/18     Neuro  -off sedation  -pain: dilaudid, oxycodone prn  -CTH negative for acute ischemic process; will consider MRI if pt's mental status does not return to baseline     CV  -HDS, off pressors  -rec'd 250 cc albumin for hypotension overnight  -monitor on telemetry      Resp  -extubated. sats >95 on RA  -ABGs prn  -daily CXR   -duonebs prn        Heme  -H/H 10.1/31.2  -INR 1.2  -q4 CBCs, transfuse per Tx     ID  -AF  -cultures  11/21: NGTD  -Abx: Cipro, fluconazole and linezolid      MSK  -OOBTC this AM     FEN/GI  -replace lytes prn  -renal diet   - TPN @ 60  -s/p ex-lap 11/16 and 11/18      Endocrine  -insulin as needed  -accuchecks     Renal:  -CRRT per renal  -trend BMP     PPX:  -SQH, PPI     dispo  -continue ICU care

## 2018-11-22 NOTE — PROGRESS NOTES
Dr. Yepez notified of pt becoming combative & pulling on lines/drains.  Orders given to apply restraints.  Will continue to monitor.

## 2018-11-22 NOTE — ASSESSMENT & PLAN NOTE
- Pt with liver failure received liver transplant. Stroke code activation 2/2 pt being non responsive   - CTH not evident of acute large ischemic changes   - pt was following commands and moving all 4 ext   - if patient not back to baseline, would rec MRI brain

## 2018-11-22 NOTE — SUBJECTIVE & OBJECTIVE
Past Medical History:   Diagnosis Date    Liver cirrhosis, alcoholic 09/30/2018     Past Surgical History:   Procedure Laterality Date    EGD (ESOPHAGOGASTRODUODENOSCOPY) N/A 11/6/2018    Performed by Duarte Jules MD at [facility] ENDO (2ND FLR)    ESOPHAGOGASTRODUODENOSCOPY N/A 11/6/2018    Procedure: EGD (ESOPHAGOGASTRODUODENOSCOPY);  Surgeon: Duarte Jules MD;  Location: [facility] ENDO (2ND FLR);  Service: Endoscopy;  Laterality: N/A;    EXPLORATORY LAPAROTOMY AFTER LIVER TRANSPLANTATION N/A 11/16/2018    Procedure: LAPAROTOMY, EXPLORATORY, AFTER LIVER TRANSPLANT;  Surgeon: Amadou Lester Jr., MD;  [facility];  Service: Transplant;  Laterality: N/A;    EXPLORATORY LAPAROTOMY AFTER LIVER TRANSPLANTATION N/A 11/18/2018    Procedure: LAPAROTOMY, EXPLORATORY, AFTER LIVER TRANSPLANT, LIKELY  ABDOMINAL CLOSURE;  Surgeon: Amadou Lester Jr., MD;  Location: [facility] OR [address] [place] [address];  Service: Transplant;  Laterality: N/A;    INSERTION OF TUNNELED CENTRAL VENOUS HEMODIALYSIS CATHETER N/A 11/7/2018    Procedure: INSERTION, CATHETER, CENTRAL VENOUS, HEMODIALYSIS, TUNNELED;  Surgeon: Brock Gonzalez MD;  Location: [facility] CATH LAB;  Service: Nephrology;  Laterality: N/A;    INSERTION, CATHETER, CENTRAL VENOUS, HEMODIALYSIS, TUNNELED N/A 11/7/2018    Performed by Brock Gonzalez MD at [facility] CATH LAB    LAPAROTOMY, EXPLORATORY N/A 11/16/2018    Procedure: LAPAROTOMY, EXPLORATORY;  Surgeon: Amadou Lester Jr., MD;  Location: [facility] OR [address] [place] [address];  Service: Transplant;  Laterality: N/A;    LAPAROTOMY, EXPLORATORY N/A 11/16/2018    Performed by Amadou Lester Jr., MD at [facility] OR 2ND FLR    LAPAROTOMY, EXPLORATORY, AFTER LIVER TRANSPLANT N/A 11/16/2018    Per[facility]    LAPAROTOMY, EXPLORATORY, AFTER LIVER TRANSPLANT, LIKELY  ABDOMINAL CLOSURE N/A 11/18/2018    Performed by Amadou Lester Jr., MD at [facility] OR 2ND FLR    LIVER TRANSPLANT N/A 11/11/2018     Procedure: TRANSPLANT, LIVER;  Surgeon: Shmuel Leary MD;  Location: [facility] OR [address] [place] [address];  Service: Transplant;  Laterality: N/A;    TRANSPLANT, LIVER N/A 11/11/2018    Performed by Shmuel Leary MD at [facility] OR [address] [place] [address]     History reviewed. No pertinent family history.  Social History     Tobacco Use    Smoking status: Never Smoker   Substance Use Topics    Alcohol use: Not on file    Drug use: Not on file     Review of patient's allergies indicates:   Allergen Reactions    Penicillins Nausea And Vomiting and Rash     Full body rash from cefepime as well       Medications: I have reviewed the current medication administration record.    Medications Prior to Admission   Medication Sig Dispense Refill Last Dose    calcium carbonate/vitamin D3 (VITAMIN D-3 ORAL) Take 1 tablet by mouth once daily.       cyanocobalamin (VITAMIN B-12) 100 MCG tablet Take 100 mcg by mouth once daily.       folic acid (FOLVITE) 1 MG tablet Take 1 mg by mouth once daily.       lactulose (CHRONULAC) 10 gram/15 mL solution Take 20 g by mouth 3 (three) times daily.       multivitamin (THERAGRAN) per tablet Take 1 tablet by mouth once daily.       omeprazole (PRILOSEC) 40 MG capsule Take 40 mg by mouth once daily.       ondansetron (ZOFRAN) 4 MG tablet Take 4 mg by mouth daily as needed for Nausea.       rifAXIMin (XIFAXAN) 550 mg Tab Take 550 mg by mouth 2 (two) times daily.          Review of Systems   Constitutional: Positive for activity change, appetite change, diaphoresis and fatigue.   HENT: Negative for ear discharge and ear pain.    Eyes: Negative for discharge and itching.   Respiratory: Negative for chest tightness.    Genitourinary: Negative for flank pain.   Neurological: Positive for speech difficulty and weakness.     Objective:     Vital Signs (Most Recent):  Temp: 98.5 °F (36.9 °C) (11/21/18 2300)  Pulse: 108 (11/22/18 0015)  Resp: (!) 22 (11/22/18 0015)  BP: 100/61 (11/22/18 0015)  SpO2: 97 % (11/22/18  0015)    Vital Signs Range (Last 24H):  Temp:  [97.8 °F (36.6 °C)-98.5 °F (36.9 °C)]   Pulse:  []   Resp:  [12-47]   BP: ()/(55-90)   SpO2:  [95 %-100 %]   Arterial Line BP: ()/(48-74)     Physical Exam    Neurological Exam:     MSE - Pt lethargic, wakes up to repeated stimuli, follows commands   CN - Pupils reactive to light bilaterally, eomi intact, blins to threat bilaterally, no facial droop   Strength - at least 3 / 5 bilateral UEx and 2 / 5 bilateral lower ext   Sensory -withdraws to pain       Laboratory:  CMP:   Recent Labs   Lab 11/21/18 0610 11/21/18  2146   CALCIUM 7.6*  7.6*  7.6*   < > 7.4*  7.4*   ALBUMIN 1.8*  1.8*  1.8*   < > 1.7*  1.7*   PROT 3.7*  --   --      139  139   < > 140  140   K 4.4  4.4  4.4   < > 4.9  4.9   CO2 24  24  24   < > 23  23     108  108   < > 111*  111*   BUN 13  13  13   < > 13  13   CREATININE 0.8  0.8  0.8   < > 0.8  0.8   ALKPHOS 233*  --   --    ALT 54*  --   --    AST 29  --   --    BILITOT 13.4*  --   --     < > = values in this interval not displayed.     CBC:   Recent Labs   Lab 11/21/18  0610   WBC 35.02*   RBC 3.35*   HGB 10.0*   HCT 30.6*   PLT 50*   MCV 91   MCH 29.9   MCHC 32.7     Lipid Panel: No results for input(s): CHOL, LDLCALC, HDL, TRIG in the last 168 hours.  Coagulation:   Recent Labs   Lab 11/21/18  0610   INR 1.3*   APTT 38.4*     Hgb A1C: No results for input(s): HGBA1C in the last 168 hours.  TSH: No results for input(s): TSH in the last 168 hours.    Diagnostic Results:      Brain imaging:        Vessel Imaging:    None    Cardiac Evaluation:     EKG - Sinus tachycardia

## 2018-11-22 NOTE — ASSESSMENT & PLAN NOTE
BG goal 140-180      BG monitoring every 6 hours and moderate dose correction scale.       Discharge plans- TBD

## 2018-11-22 NOTE — ASSESSMENT & PLAN NOTE
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   - s/p OHLTx 11/11/2018; intra-op SLED  - remaining anuric, but clearance numbers continue to look great    Plan:  -SCUF again overnight, goal to match Is & Os, reassess in am

## 2018-11-22 NOTE — PROGRESS NOTES
[facility]  Critical Care - Surgery  Progress Note    Patient Name: Femi Enciso  MRN: [id number]  Admission Date: 10/27/2018  Hospital Length of Stay: 26 days  Code Status: Full Code  Attending Provider: Femi Patel MD  Primary Care Provider: Primary Doctor No   Principal Problem: Acute encephalopathy    Subjective:     Hospital/ICU Course:  11/11: s/p liver transplant  11/12 - extubated, weaned off pressors; pulled out all 3 JOSE A drains  11/13 - CRRT held overnight; 1U Plt  11/14 -2u pRBC  11/15- 1u prbc  11/16- OR x 2 for bleeding, abthera + JOSE A x 1, 7 PRBC, 3 plt, 1 FFP  11/17 - 2 Plt, 1 FFP, 2 RBCs     Interval History/Significant Events:     CVL removed overnight. Pt became unresponsive and stroke code was activated. CTH was negative, pt PAINTING on vascular neuro exam. Pt combative and agitated overnight; restraints applied.     Follow-up For: Procedure(s) (LRB):  LAPAROTOMY, EXPLORATORY, AFTER LIVER TRANSPLANT, LIKELY  ABDOMINAL CLOSURE (N/A)    Post-Operative Day: 4 Days Post-Op    Objective:     Vital Signs (Most Recent):  Temp: 98 °F (36.7 °C) (11/22/18 0300)  Pulse: 105 (11/22/18 0600)  Resp: (!) 24 (11/22/18 0600)  BP: 113/70 (11/22/18 0600)  SpO2: 100 % (11/22/18 0600) Vital Signs (24h Range):  Temp:  [97.8 °F (36.6 °C)-98.5 °F (36.9 °C)] 98 °F (36.7 °C)  Pulse:  [] 105  Resp:  [20-47] 24  SpO2:  [92 %-100 %] 100 %  BP: ()/(55-90) 113/70  Arterial Line BP: ()/(48-74) 119/60     Weight: 96.3 kg (212 lb 4.9 oz)  Body mass index is 30.46 kg/m².      Intake/Output Summary (Last 24 hours) at 11/22/2018 0645  Last data filed at 11/22/2018 0600  Gross per 24 hour   Intake 3900 ml   Output 5478 ml   Net -1578 ml       Physical Exam     Constitutional: He appears well-developed.   jaundiced   HENT:   Head: Normocephalic and atraumatic.   Eyes: Scleral icterus is present.   Cardiovascular: Normal rate, regular rhythm and intact distal pulses.   Pulmonary/Chest: Effort normal. No  respiratory distress.   Sats > 95 on NC   Abdominal: Soft. He exhibits no distension.   Incision closed wityh staples, C/D/I  JOSE A drains in place with serous output.   Musculoskeletal: He exhibits edema.   Neurological:   AAOx3  Skin: Skin is warm and dry.   Jaundice improving     Lines/Drains/Airways     Central Venous Catheter Line                 Tunneled Central Line Insertion/Assessment - Double Lumen  11/07/18 1350 right internal jugular 14 days          Drain                 Closed/Suction Drain 11/16/18 1127 Right;Anterior RLQ Bulb 19 Fr. 5 days          Arterial Line                 Arterial Line 11/11/18 0159 Left Radial 11 days          Peripheral Intravenous Line                 Peripheral IV - Single Lumen 11/21/18 1100 Anterior;Left Forearm less than 1 day         Peripheral IV - Single Lumen 11/21/18 1100 Right Antecubital less than 1 day                Significant Labs:    CBC/Anemia Profile:  Recent Labs   Lab 11/20/18  0746 11/21/18  0610 11/22/18  0310   WBC 32.33* 35.02* 48.02*   HGB 10.1* 10.0* 10.1*   HCT 29.9* 30.6* 31.2*   PLT 69* 50* 60*   MCV 89 91 91   RDW 17.6* 18.4* 18.5*        Chemistries:  Recent Labs   Lab 11/20/18  0746  11/21/18  0610 11/21/18  1320 11/21/18  2146 11/22/18  0310      < > 139  139  139 137 140  140 141  141  141   K 4.2   < > 4.4  4.4  4.4 4.7 4.9  4.9 4.8  4.8  4.8      < > 108  108  108 106 111*  111* 112*  112*  112*   CO2 23   < > 24  24  24 23 23  23 19*  19*  19*   BUN 12   < > 13  13  13 21* 13  13 11  11  11   CREATININE 0.8   < > 0.8  0.8  0.8 1.1 0.8  0.8 0.7  0.7  0.7   CALCIUM 7.4*   < > 7.6*  7.6*  7.6* 7.8* 7.4*  7.4* 7.7*  7.7*  7.7*   ALBUMIN 2.0*   < > 1.8*  1.8*  1.8* 1.7* 1.7*  1.7* 1.7*  1.7*  1.7*   PROT 3.7*  --  3.7*  --   --  3.7*   BILITOT 13.6*  --  13.4*  --   --  11.9*   ALKPHOS 203*  --  233*  --   --  290*   ALT 56*  --  54*  --   --  66*   AST 27  --  29  --   --  41*   MG  --    < >  2.1 1.7 1.6  1.6 2.1  2.1   PHOS  --    < > 3.0  3.0  3.0 4.3 3.1  3.1 2.7  2.7  2.7    < > = values in this interval not displayed.       All pertinent labs within the past 24 hours have been reviewed.    Significant Imaging:  I have reviewed all pertinent imaging results/findings within the past 24 hours.    Assessment/Plan:     S/P liver transplant    Assessment/Plan:   Femi Enciso is a 53 y.o. male now s/p OLT         * S/P liver transplant        53 year old male with history of ESLD 2/2 ETOH cirrhosis who is s/p liver transplant 11/11/18     Neuro  -off sedation  -pain: dilaudid, oxycodone prn  -CTH negative for acute ischemic process; will consider MRI if pt's mental status does not return to baseline     CV  -HDS, off pressors  -rec'd 250 cc albumin for hypotension overnight  -monitor on telemetry      Resp  -extubated. sats >95 on RA  -ABGs prn  -daily CXR   -duonebs prn        Heme  -H/H 10.1/31.2  -INR 1.2  -q4 CBCs, transfuse per Tx     ID  -AF  -cultures  11/21: NGTD  -Abx: Cipro, fluconazole and linezolid      MSK  -OOBTC this AM     FEN/GI  -replace lytes prn  -renal diet   - TPN @ 60  -s/p ex-lap 11/16 and 11/18      Endocrine  -insulin as needed  -accuchecks     Renal:  -CRRT per renal  -trend BMP     PPX:  -SQH, PPI     dispo  -continue ICU care                Critical care was time spent personally by me on the following activities: development of treatment plan with patient or surrogate and bedside caregivers, discussions with consultants, evaluation of patient's response to treatment, examination of patient, ordering and performing treatments and interventions, ordering and review of laboratory studies, ordering and review of radiographic studies, pulse oximetry, re-evaluation of patient's condition.  This critical care time did not overlap with that of any other provider or involve time for any procedures.     Adolph Pavon MD  Critical Care - Surgery  [facility]  [place]     impaired

## 2018-11-22 NOTE — H&P
Inpatient Radiology Pre-procedure Note    History of Present Illness:  Femi Enciso is a 53 y.o. male who presents for abscess drainage. Pt recently had liver tx and now has collection around liver and elevated WBC. We were consulted to drain this perihepatic FC.    Patient had SubQ heparin ppx this AM at 605 AM.        Admission H&P reviewed.  Past Medical History:   Diagnosis Date    Liver cirrhosis, alcoholic 09/30/2018     Past Surgical History:   Procedure Laterality Date    EGD (ESOPHAGOGASTRODUODENOSCOPY) N/A 11/6/2018    Performed by Duarte Jules MD at [facility] ([address] [place] [address])    ESOPHAGOGASTRODUODENOSCOPY N/A 11/6/2018    Procedure: EGD (ESOPHAGOGASTRODUODENOSCOPY);  Surgeon: Duarte Jules MD;  Location: [address]);  Service: Endoscopy;  Laterality: N/A;    EXPLORATORY LAPAROTOMY AFTER LIVER TRANSPLANTATION N/A 11/16/2018    Procedure: LAPAROTOMY, EXPLORATORY, AFTER LIVER TRANSPLANT;  Surgeon: Amadou Lester Jr., MD;  Location: [address];  Service: Transplant;  Laterality: N/A;    EXPLORATORY LAPAROTOMY AFTER LIVER TRANSPLANTATION N/A 11/18/2018    Procedure: LAPAROTOMY, EXPLORATORY, AFTER LIVER TRANSPLANT, LIKELY  ABDOMINAL CLOSURE;  Surgeon: Amadou Lester Jr., MD;  Location: [address];  Service: Transplant;  Laterality: N/A;    INSERTION OF TUNNELED CENTRAL VENOUS HEMODIALYSIS CATHETER N/A 11/7/2018    Procedure: INSERTION, CATHETER, CENTRAL VENOUS, HEMODIALYSIS, TUNNELED;  Surgeon: Brock Gonzalez MD;  Location: [facility] CATH LAB;  Service: Nephrology;  Laterality: N/A;    INSERTION, CATHETER, CENTRAL VENOUS, HEMODIALYSIS, TUNNELED N/A 11/7/2018    Performed by Brock Gonzalez MD at [facility] CATH LAB    LAPAROTOMY, EXPLORATORY N/A 11/16/2018    Procedure: LAPAROTOMY, EXPLORATORY;  Surgeon: Amadou Lester Jr., MD;  Location: [address];  Service: Transplant;  Laterality: N/A;    LAPAROTOMY, EXPLORATORY N/A 11/16/2018    Performed by Amadou Lester Jr., MD at  [facility] OR 2ND FLR    LAPAROTOMY, EXPLORATORY, AFTER LIVER TRANSPLANT N/A 11/16/2018    Performed by Amadou Lester Jr., MD at [facility] OR 2ND FLR    LAPAROTOMY, EXPLORATORY, AFTER LIVER TRANSPLANT, LIKELY  ABDOMINAL CLOSURE N/A 11/18/2018    Performed by Amadou Lester Jr., MD at [facility] 2ND FLR    LIVER TRANSPLANT N/A 11/11/2018    Procedure: TRANSPLANT, LIVER;  Surgeon: Shmuel Leary MD;  Location: [facility] OR [address] [place] [address];  Service: Transplant;  Laterality: N/A;    TRANSPLANT, LIVER N/A 11/11/2018    Performed by Shmuel Leary MD at [facility] OR 2ND FLR       Review of Systems:   As documented in primary team H&P    Home Meds:   Prior to Admission medications    Medication Sig Start Date End Date Taking? Authorizing Provider   calcium carbonate/vitamin D3 (VITAMIN D-3 ORAL) Take 1 tablet by mouth once daily.   Yes Historical Provider, MD   cyanocobalamin (VITAMIN B-12) 100 MCG tablet Take 100 mcg by mouth once daily.   Yes Historical Provider, MD   folic acid (FOLVITE) 1 MG tablet Take 1 mg by mouth once daily.   Yes Historical Provider, MD   lactulose (CHRONULAC) 10 gram/15 mL solution Take 20 g by mouth 3 (three) times daily.   Yes Historical Provider, MD   multivitamin (THERAGRAN) per tablet Take 1 tablet by mouth once daily.   Yes Historical Provider, MD   omeprazole (PRILOSEC) 40 MG capsule Take 40 mg by mouth once daily.   Yes Historical Provider, MD   ondansetron (ZOFRAN) 4 MG tablet Take 4 mg by mouth daily as needed for Nausea.   Yes Historical Provider, MD   rifAXIMin (XIFAXAN) 550 mg Tab Take 550 mg by mouth 2 (two) times daily.   Yes Historical Provider, MD   famotidine (PEPCID) 20 MG tablet Take 1 tablet (20 mg total) by mouth once daily. 11/12/18   Femi Patel MD   fluconazole (DIFLUCAN) 200 MG Tab Take 1 tablet (200 mg total) by mouth once daily. Stop 12/11/18 11/11/18 12/12/18  Femi Patel MD   mycophenolate (CELLCEPT) 250 mg Cap Take 4 capsules (1,000 mg total) by mouth 2 (two)  times daily. 11/12/18   Femi Patel MD   predniSONE (DELTASONE) 10 MG tablet Take by mouth daily: 20mg 11/17-11/23, 15mg 11/24-11/30, 10mg 12/1-12/7, 5mg 12/8-12/14, 2.5mg 12/15-12/21, Stop 12/22 11/12/18   Femi Patel MD   sulfamethoxazole-trimethoprim 400-80mg (BACTRIM,SEPTRA) 400-80 mg per tablet Take 1 tablet by mouth once daily. STOP 5/10/19 11/11/18 5/10/19  Femi Patel MD   tacrolimus (PROGRAF) 1 MG Cap Take 6 capsules (6 mg total) by mouth every 12 (twelve) hours. 11/12/18   Femi Patel MD   valGANciclovir (VALCYTE) 450 mg Tab Take 1 tablet (450 mg total) by mouth once daily. Stop 5/10/19 11/11/18 5/10/19  Femi Patel MD     Scheduled Meds:    albumin human 25%  25 g Intravenous Q8H    ceFEPime (MAXIPIME) IVPB  1 g Intravenous Q12H    DAPTOmycin (CUBICIN)  IV  10 mg/kg Intravenous Q24H    famotidine (PF)  20 mg Intravenous BID    ganciclovir (CYTOVENE) IVPB  5 mg/kg (Dosing Weight) Intravenous Q24H    heparin (porcine)  5,000 Units Subcutaneous Q8H    hydrocortisone sodium succinate  50 mg Intravenous Q8H    custom IVPB builder  372 mg Intravenous Q24H    metronidazole  500 mg Intravenous Q8H    QUEtiapine  25 mg Oral QHS     Continuous Infusions:    sodium chloride 0.9%       PRN Meds:dextrose 50%, dextrose 50%, glucagon (human recombinant), glucose, glucose, HYDROmorphone, insulin aspart U-100, magnesium sulfate IVPB, oxyCODONE, oxyCODONE, potassium phosphate IVPB, ramelteon  Anticoagulants/Antiplatelets: no anticoagulation    Allergies:   Review of patient's allergies indicates:   Allergen Reactions    Penicillins Nausea And Vomiting and Rash     Full body rash from cefepime as well     Sedation Hx: have not been any systemic reactions    Labs:  Recent Labs   Lab 11/22/18  0310   INR 1.2       Recent Labs   Lab 11/22/18  0310   WBC 48.02*   HGB 10.1*   HCT 31.2*   MCV 91   PLT 60*      Recent Labs   Lab 11/22/18  0310   *  125*  125*     141  141   K 4.8   4.8  4.8   *  112*  112*   CO2 19*  19*  19*   BUN 11  11  11   CREATININE 0.7  0.7  0.7   CALCIUM 7.7*  7.7*  7.7*   MG 2.1  2.1   ALT 66*   AST 41*   ALBUMIN 1.7*  1.7*  1.7*   BILITOT 11.9*   BILIDIR 9.2*         Vitals:  Temp: 98.2 °F (36.8 °C) (11/22/18 0700)  Pulse: 98 (11/22/18 1015)  Resp: (!) 23 (11/22/18 1015)  BP: 102/61 (11/22/18 1015)  SpO2: 100 % (11/22/18 1015)     Physical Exam:  ASA: 3  Mallampati: 2    General: no acute distress  Mental Status: alert and oriented to person, place and time  HEENT: normocephalic, atraumatic  Chest: unlabored breathing  Abdomen: nondistended  Extremity: moves all extremities    Plan: Place drain in perihepatic F.C.  Sedation Plan: Moderate      Patient had SubQ heparin ppx this AM at 605 AM.      Percy Chun MD  PGY - 3  Department of Radiology

## 2018-11-22 NOTE — PLAN OF CARE
Pt between ST & NSR during shift. Pt titrated down to RA; O2 sats >95%.  SBP maintained >100 w/decrease in UF rate & Albumin. 250cc albumin given for hypotension.  CRRT UF @ goal of 300/hr until 0330, UF rate decreased to 250/hr d/t hypotension. JOSE A drain:450cc brown/ drainage.  Pt had 1 BM.  Pt confused & agitated throughout night;  restrained d/t pulling on lines & JOSE A drain. Plans to continue CRRT throughout day. Plan of care reviewed w/pt & spouse; all questions answered.

## 2018-11-22 NOTE — SUBJECTIVE & OBJECTIVE
Interval History: still remaining anuric    Review of patient's allergies indicates:   Allergen Reactions    Penicillins Nausea And Vomiting and Rash     Full body rash from cefepime as well     Current Facility-Administered Medications   Medication Frequency    albumin human 5% 5 % bottle     0.9%  NaCl infusion (CRRT USE ONLY) Continuous    albumin human 25% bottle 25 g Q8H    albumin human 5% bottle 25 g Once    ceFEPIme injection 1 g Q12H    DAPTOmycin (CUBICIN) 980 mg in sodium chloride 0.9% IVPB Q24H    dextrose 50% injection 12.5 g PRN    dextrose 50% injection 25 g PRN    famotidine (PF) injection 20 mg BID    ganciclovir (CYTOVENE) 419 mg in sodium chloride 0.9% 100 mL IVPB Q24H    glucagon (human recombinant) injection 1 mg PRN    glucose chewable tablet 16 g PRN    glucose chewable tablet 24 g PRN    heparin (porcine) injection 5,000 Units Q8H    hydrocortisone sodium succinate injection 50 mg Q8H    HYDROmorphone injection 0.2 mg Q3H PRN    insulin aspart U-100 pen 1-10 Units QID (AC + HS) PRN    isavuconazonium sulfate 372 mg in dextrose 5 % 250 mL Q24H    magnesium sulfate 2g in water 50mL IVPB (premix) PRN    metronidazole IVPB 500 mg Q8H    oxyCODONE immediate release tablet 5 mg Q4H PRN    oxyCODONE immediate release tablet Tab 10 mg Q4H PRN    potassium phosphate 15 mmol in dextrose 5 % 250 mL infusion BID PRN    QUEtiapine tablet 25 mg QHS    ramelteon tablet 8 mg Nightly PRN       Objective:     Vital Signs (Most Recent):  Temp: 98 °F (36.7 °C) (11/22/18 1100)  Pulse: 101 (11/22/18 1300)  Resp: (!) 29 (11/22/18 1300)  BP: (!) 87/50 (11/22/18 1300)  SpO2: 100 % (11/22/18 1300)  O2 Device (Oxygen Therapy): room air (11/22/18 1300) Vital Signs (24h Range):  Temp:  [98 °F (36.7 °C)-98.5 °F (36.9 °C)] 98 °F (36.7 °C)  Pulse:  [] 101  Resp:  [16-37] 29  SpO2:  [92 %-100 %] 100 %  BP: ()/(50-81) 87/50  Arterial Line BP: ()/(47-74) 96/47     Weight: 96.3 kg  (212 lb 4.9 oz) (11/22/18 0500)  Body mass index is 30.46 kg/m².  Body surface area is 2.18 meters squared.    I/O last 3 completed shifts:  In: 6159.4 [P.O.:240; I.V.:5739; IV Piggyback:50]  Out: 8471 [Drains:1480; Other:6990; Stool:1]    Physical Exam   Constitutional: He is oriented to person, place, and time.   HENT:   Head: Atraumatic.   Neck: No JVD present.   Cardiovascular: Normal rate and regular rhythm. Exam reveals no friction rub.   Pulmonary/Chest: Effort normal and breath sounds normal.   Abdominal: Soft. He exhibits distension.   Musculoskeletal: He exhibits edema.   Neurological: He is alert and oriented to person, place, and time.   Skin: Skin is warm.   Psychiatric: He has a normal mood and affect.

## 2018-11-22 NOTE — PROGRESS NOTES
[facility]  Nephrology  Progress Note    Patient Name: Femi Enciso  MRN: [id number]  Admission Date: 10/27/2018  Hospital Length of Stay: 26 days  Attending Provider: Femi Patel MD   Primary Care Physician: Primary Doctor No  Principal Problem:Acute encephalopathy    Subjective:     HPI: 54 y/o man with DM2 presents to the ED with family for liver failure (likely due to EtOH abundant history of drinking - diagnosed in Sept 2018 in [place]).  He reports jaundice, generalized weakness, nausea, diarrhea, and decreased appetite since Sept 2018.  He is from [place], [place], and Dr. Sharma (patients physician) recommended bringing him to hospital for evaluation.  Patient denies any fever, chills, vomiting, chest pain, palpitations, SOB, abdominal pain.      Nephrology consulted for evaluation/management Adri.     Interval History: still remaining anuric    Review of patient's allergies indicates:   Allergen Reactions    Penicillins Nausea And Vomiting and Rash     Full body rash from cefepime as well     Current Facility-Administered Medications   Medication Frequency    albumin human 5% 5 % bottle     0.9%  NaCl infusion (CRRT USE ONLY) Continuous    albumin human 25% bottle 25 g Q8H    albumin human 5% bottle 25 g Once    ceFEPIme injection 1 g Q12H    DAPTOmycin (CUBICIN) 980 mg in sodium chloride 0.9% IVPB Q24H    dextrose 50% injection 12.5 g PRN    dextrose 50% injection 25 g PRN    famotidine (PF) injection 20 mg BID    ganciclovir (CYTOVENE) 419 mg in sodium chloride 0.9% 100 mL IVPB Q24H    glucagon (human recombinant) injection 1 mg PRN    glucose chewable tablet 16 g PRN    glucose chewable tablet 24 g PRN    heparin (porcine) injection 5,000 Units Q8H    hydrocortisone sodium succinate injection 50 mg Q8H    HYDROmorphone injection 0.2 mg Q3H PRN    insulin aspart U-100 pen 1-10 Units QID (AC + HS) PRN    isavuconazonium sulfate 372 mg in dextrose 5 % 250 mL Q24H    magnesium  sulfate 2g in water 50mL IVPB (premix) PRN    metronidazole IVPB 500 mg Q8H    oxyCODONE immediate release tablet 5 mg Q4H PRN    oxyCODONE immediate release tablet Tab 10 mg Q4H PRN    potassium phosphate 15 mmol in dextrose 5 % 250 mL infusion BID PRN    QUEtiapine tablet 25 mg QHS    ramelteon tablet 8 mg Nightly PRN       Objective:     Vital Signs (Most Recent):  Temp: 98 °F (36.7 °C) (11/22/18 1100)  Pulse: 101 (11/22/18 1300)  Resp: (!) 29 (11/22/18 1300)  BP: (!) 87/50 (11/22/18 1300)  SpO2: 100 % (11/22/18 1300)  O2 Device (Oxygen Therapy): room air (11/22/18 1300) Vital Signs (24h Range):  Temp:  [98 °F (36.7 °C)-98.5 °F (36.9 °C)] 98 °F (36.7 °C)  Pulse:  [] 101  Resp:  [16-37] 29  SpO2:  [92 %-100 %] 100 %  BP: ()/(50-81) 87/50  Arterial Line BP: ()/(47-74) 96/47     Weight: 96.3 kg (212 lb 4.9 oz) (11/22/18 0500)  Body mass index is 30.46 kg/m².  Body surface area is 2.18 meters squared.    I/O last 3 completed shifts:  In: 6159.4 [P.O.:240; I.V.:5739; IV Piggyback:50]  Out: 8471 [Drains:1480; Other:6990; Stool:1]    Physical Exam   Constitutional: He is oriented to person, place, and time.   HENT:   Head: Atraumatic.   Neck: No JVD present.   Cardiovascular: Normal rate and regular rhythm. Exam reveals no friction rub.   Pulmonary/Chest: Effort normal and breath sounds normal.   Abdominal: Soft. He exhibits distension.   Musculoskeletal: He exhibits edema.   Neurological: He is alert and oriented to person, place, and time.   Skin: Skin is warm.   Psychiatric: He has a normal mood and affect.           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   - s/p OHLTx 11/11/2018; intra-op SLED  - remaining anuric, but clearance  numbers continue to look great    Plan:  -SCUF again overnight, goal to match Is & Os, reassess in am           Thank you for your consult. I will follow-up with patient. Please contact us if you have any additional questions.    Petar Hoyos MD  Nephrology  [facility]

## 2018-11-22 NOTE — PROGRESS NOTES
[facility]  Infectious Disease  Progress Note    Patient Name: Femi Enciso  MRN: [id number]  Admission Date: 10/27/2018  Length of Stay: 26 days  Attending Physician: Femi Patel MD  Primary Care Provider: Primary Doctor No    Isolation Status: Contact  Assessment/Plan:      Delirium    54yo man w/a history of DM2 and alcohol dependence with associated hepatitis who was admitted on 10/27/2018 with acute liver failure (c/b PSE, HRS, hepatic hydrothorax) and ultimately underwent liver transplantation (s/p DBDLT 11/11/2018, CMV D+/R-, steroid induction, on maintenance tacro/MMF/pred; c/b post-operative delirium, VRE bacteruria, and IA hemorrhage requiring re-do ex-lap, RP/retrorenal/retrocolic hematoma evacuation, cauterization of RP bleed, partial right adrenalectomy, and temporary vac abdominal closure on 11/16/2018 and planned benign second look washout on 11/18/2018). Patient's bleeding has sabilized since cauterization but OR cultures have grown VRE.faecium confirming that this hematoma was infected. He decompensated on 11/21 with acute AMS concerning for CVA clinically vs toxic/metabolic induced delirium with an ongoing leukocytosis despite appropriate coverage per cultures.    - would continue daptomycin as VRE isolate is sensitive and this agent will be better tolerated long term (no expected significant myelosuppression as with linezolid); weekly CPK needed and contact precautions ordered  - would continue empiric cefepime/flagyl given persistent leukocytosis for coverage of IA ty  - await pending repeat blood cultures given leukocytosis/AMS   - would continue posaconazole for mold prophylaxis per institutional protocol  - remainder of prophylaxis per protocol  - will f/u pending operative cultures and pending imaging studies to tailor therapy         Anticipated Disposition: pending improvement    Thank you for your consult. I will follow-up with patient. Please contact us if you have  any additional questions.     Rosalee Ireland MD  Transplant ID Attending  [id number]    Rosalee Ireland MD  Infectious Disease  [facility]    Subjective:     Principal Problem:Acute encephalopathy    HPI: No notes on file  Interval History: AMS somewhat improved in that he is now verbal but remains confused. Afebrile. Leukocytosis remains dramatic and uptrending. No growth from repeat blood cx. CT head w/o recent CVA, possible MRI pending.    Review of Systems   Unable to perform ROS: Mental status change     Objective:     Vital Signs (Most Recent):  Temp: 98.2 °F (36.8 °C) (11/22/18 0700)  Pulse: 98 (11/22/18 1015)  Resp: (!) 23 (11/22/18 1015)  BP: 102/61 (11/22/18 1015)  SpO2: 100 % (11/22/18 1015) Vital Signs (24h Range):  Temp:  [98 °F (36.7 °C)-98.5 °F (36.9 °C)] 98.2 °F (36.8 °C)  Pulse:  [] 98  Resp:  [20-47] 23  SpO2:  [92 %-100 %] 100 %  BP: ()/(55-90) 102/61  Arterial Line BP: ()/(48-74) 106/53     Weight: 96.3 kg (212 lb 4.9 oz)  Body mass index is 30.46 kg/m².    Estimated Creatinine Clearance: 142.1 mL/min (based on SCr of 0.7 mg/dL).    Physical Exam   Constitutional: He appears well-developed. No distress.   HENT:   Head: Atraumatic.   Mouth/Throat: Oropharynx is clear and moist. No oropharyngeal exudate.   Eyes: Conjunctivae and EOM are normal. Pupils are equal, round, and reactive to light. Scleral icterus is present.   Neck: Neck supple.   Cardiovascular: Normal rate and regular rhythm. Exam reveals no friction rub.   No murmur heard.  Pulmonary/Chest: Breath sounds normal. No respiratory distress. He has no wheezes. He has no rales. He exhibits no tenderness.   Abdominal: Soft. Bowel sounds are normal. He exhibits distension. There is tenderness. There is no rebound and no guarding.   Brownish SS fluid in JOSE A. [place] dressed.   Musculoskeletal: Normal range of motion. He exhibits edema.   Lymphadenopathy:     He has no cervical adenopathy.   Neurological:  He is alert.   Skin: No rash noted. No erythema.       Significant Labs:   CBC:   Recent Labs   Lab 11/21/18  0610 11/22/18  0310   WBC 35.02* 48.02*   HGB 10.0* 10.1*   HCT 30.6* 31.2*   PLT 50* 60*     CMP:   Recent Labs   Lab 11/21/18  0610 11/21/18  1320 11/21/18  2146 11/22/18  0310     139  139 137 140  140 141  141  141   K 4.4  4.4  4.4 4.7 4.9  4.9 4.8  4.8  4.8     108  108 106 111*  111* 112*  112*  112*   CO2 24  24  24 23 23  23 19*  19*  19*   GLU 91  91  91 136* 134*  134* 125*  125*  125*   BUN 13  13  13 21* 13  13 11  11  11   CREATININE 0.8  0.8  0.8 1.1 0.8  0.8 0.7  0.7  0.7   CALCIUM 7.6*  7.6*  7.6* 7.8* 7.4*  7.4* 7.7*  7.7*  7.7*   PROT 3.7*  --   --  3.7*   ALBUMIN 1.8*  1.8*  1.8* 1.7* 1.7*  1.7* 1.7*  1.7*  1.7*   BILITOT 13.4*  --   --  11.9*   ALKPHOS 233*  --   --  290*   AST 29  --   --  41*   ALT 54*  --   --  66*   ANIONGAP 7*  7*  7* 8 6*  6* 10  10  10   EGFRNONAA >60.0  >60.0  >60.0 >60.0 >60.0  >60.0 >60.0  >60.0  >60.0       Significant Imaging: I have reviewed all pertinent imaging results/findings within the past 24 hours.

## 2018-11-22 NOTE — PROCEDURES
Radiology Post-Procedure Note    Pre Op Diagnosis: Abscess    Post Op Diagnosis: Same     Procedure:right upper quadrant drainage    Procedure performed by: Keven Carmichael MD    Written Informed Consent Obtained: Yes    Specimen Removed:  cc bilious fluid    Estimated Blood Loss: Minimal    Findings: CT was used for localization of abnormal fluid collection. A needle was inserted into the fluid collection and bilious fluid was aspirated.  A wire was inserted into the collection and the tract was dilated.  A 10 Malagasy all-purpose drainage catheter was inserted and a pigtail loop of the distal end was formed.  The catheter was sutured into place and approximately  200 mL fluid was removed.     A specimen was sent to the lab for further analysis and culture.    The patient tolerated procedure well and there were no complications. Please see procedure report under Imaging for further details.    Keven Carmichael M.D.  Diagnostic and Interventional Radiologist  Department of Radiology  Pager: [phone]

## 2018-11-22 NOTE — HPI
Mr Enciso is a 54 y/o CM with PMHx of Liver failure treated with liver transplant, has been in the SICU post transplant, EtOH dependence, coagulopathy, Anemia of chronic disease, Thrombocytopenia, Acute renal injury.     Vascular Neurology was consulted and stroke code was activated since patient became unresponsive. Pt wasa at baseline communicating and moving all 4 extremities. His central line was taken out and then became unresponsive. He was on reverse trendelenburg position for the procedure. By the time team arrived at bedside, pt was able to move bilateral lower limbs and left upper ext. He was not able to move right UEx which he did on CT scanner. He was following commands. He also had blinking on threat in both eyes.

## 2018-11-23 LAB
ALBUMIN SERPL BCP-MCNC: 2.2 G/DL
ALBUMIN SERPL BCP-MCNC: 2.5 G/DL
ALP SERPL-CCNC: 230 U/L
ALT SERPL W/O P-5'-P-CCNC: 51 U/L
ANION GAP SERPL CALC-SCNC: 7 MMOL/L
ANION GAP SERPL CALC-SCNC: 9 MMOL/L
ANISOCYTOSIS BLD QL SMEAR: ABNORMAL
APTT BLDCRRT: 36.8 SEC
AST SERPL-CCNC: 36 U/L
BASO STIPL BLD QL SMEAR: ABNORMAL
BASOPHILS # BLD AUTO: 0.14 K/UL
BASOPHILS NFR BLD: 0.6 %
BILIRUB DIRECT SERPL-MCNC: 9.2 MG/DL
BILIRUB SERPL-MCNC: 12.1 MG/DL
BUN SERPL-MCNC: 16 MG/DL
BUN SERPL-MCNC: 9 MG/DL
CALCIUM SERPL-MCNC: 7 MG/DL
CALCIUM SERPL-MCNC: 7.8 MG/DL
CHLORIDE SERPL-SCNC: 110 MMOL/L
CHLORIDE SERPL-SCNC: 111 MMOL/L
CK SERPL-CCNC: 25 U/L
CO2 SERPL-SCNC: 23 MMOL/L
CREAT SERPL-MCNC: 0.7 MG/DL
CREAT SERPL-MCNC: 1 MG/DL
DIFFERENTIAL METHOD: ABNORMAL
EOSINOPHIL # BLD AUTO: 0.8 K/UL
EOSINOPHIL NFR BLD: 3.2 %
ERYTHROCYTE [DISTWIDTH] IN BLOOD BY AUTOMATED COUNT: 18.5 %
EST. GFR  (AFRICAN AMERICAN): >60 ML/MIN/1.73 M^2
EST. GFR  (NON AFRICAN AMERICAN): >60 ML/MIN/1.73 M^2
GGT SERPL-CCNC: 354 U/L
GLUCOSE SERPL-MCNC: 111 MG/DL
GLUCOSE SERPL-MCNC: 131 MG/DL
HCT VFR BLD AUTO: 24.5 %
HGB BLD-MCNC: 8.2 G/DL
HYPOCHROMIA BLD QL SMEAR: ABNORMAL
IMM GRANULOCYTES # BLD AUTO: 0.94 K/UL
IMM GRANULOCYTES NFR BLD AUTO: 3.8 %
INR PPP: 1.4
LYMPHOCYTES # BLD AUTO: 1.7 K/UL
LYMPHOCYTES NFR BLD: 6.9 %
MAGNESIUM SERPL-MCNC: 2.1 MG/DL
MAGNESIUM SERPL-MCNC: 2.1 MG/DL
MCH RBC QN AUTO: 30.3 PG
MCHC RBC AUTO-ENTMCNC: 33.5 G/DL
MCV RBC AUTO: 90 FL
MONOCYTES # BLD AUTO: 2.6 K/UL
MONOCYTES NFR BLD: 10.7 %
NEUTROPHILS # BLD AUTO: 18.3 K/UL
NEUTROPHILS NFR BLD: 74.8 %
NRBC BLD-RTO: 0 /100 WBC
PHOSPHATE SERPL-MCNC: 2.2 MG/DL
PHOSPHATE SERPL-MCNC: 3.8 MG/DL
PLATELET # BLD AUTO: 38 K/UL
PLATELET BLD QL SMEAR: ABNORMAL
PMV BLD AUTO: ABNORMAL FL
POLYCHROMASIA BLD QL SMEAR: ABNORMAL
POTASSIUM SERPL-SCNC: 4.6 MMOL/L
POTASSIUM SERPL-SCNC: 4.8 MMOL/L
PROT SERPL-MCNC: 3.9 G/DL
PROTHROMBIN TIME: 14.2 SEC
RBC # BLD AUTO: 2.71 M/UL
SODIUM SERPL-SCNC: 141 MMOL/L
SODIUM SERPL-SCNC: 142 MMOL/L
TACROLIMUS BLD-MCNC: 2.5 NG/ML
WBC # BLD AUTO: 24.51 K/UL

## 2018-11-23 PROCEDURE — 99231 PR SUBSEQUENT HOSPITAL CARE,LEVL I: ICD-10-PCS | Mod: ,,, | Performed by: NURSE PRACTITIONER

## 2018-11-23 PROCEDURE — 99233 SBSQ HOSP IP/OBS HIGH 50: CPT | Mod: ,,, | Performed by: INTERNAL MEDICINE

## 2018-11-23 PROCEDURE — 99232 SBSQ HOSP IP/OBS MODERATE 35: CPT | Mod: ,,, | Performed by: INTERNAL MEDICINE

## 2018-11-23 PROCEDURE — 80053 COMPREHEN METABOLIC PANEL: CPT

## 2018-11-23 PROCEDURE — 63600175 PHARM REV CODE 636 W HCPCS: Mod: JG | Performed by: INTERNAL MEDICINE

## 2018-11-23 PROCEDURE — 99231 SBSQ HOSP IP/OBS SF/LOW 25: CPT | Mod: ,,, | Performed by: NURSE PRACTITIONER

## 2018-11-23 PROCEDURE — 82977 ASSAY OF GGT: CPT

## 2018-11-23 PROCEDURE — G8996 SWALLOW CURRENT STATUS: HCPCS | Mod: CM

## 2018-11-23 PROCEDURE — 97535 SELF CARE MNGMENT TRAINING: CPT

## 2018-11-23 PROCEDURE — 82550 ASSAY OF CK (CPK): CPT

## 2018-11-23 PROCEDURE — 25000003 PHARM REV CODE 250: Performed by: NURSE PRACTITIONER

## 2018-11-23 PROCEDURE — 25000003 PHARM REV CODE 250: Performed by: INTERNAL MEDICINE

## 2018-11-23 PROCEDURE — 99232 PR SUBSEQUENT HOSPITAL CARE,LEVL II: ICD-10-PCS | Mod: ,,, | Performed by: INTERNAL MEDICINE

## 2018-11-23 PROCEDURE — 85730 THROMBOPLASTIN TIME PARTIAL: CPT

## 2018-11-23 PROCEDURE — 25000003 PHARM REV CODE 250: Performed by: SURGERY

## 2018-11-23 PROCEDURE — 63600175 PHARM REV CODE 636 W HCPCS: Mod: JG | Performed by: SURGERY

## 2018-11-23 PROCEDURE — 25000003 PHARM REV CODE 250: Performed by: STUDENT IN AN ORGANIZED HEALTH CARE EDUCATION/TRAINING PROGRAM

## 2018-11-23 PROCEDURE — 63600175 PHARM REV CODE 636 W HCPCS: Performed by: SURGERY

## 2018-11-23 PROCEDURE — S0030 INJECTION, METRONIDAZOLE: HCPCS | Performed by: SURGERY

## 2018-11-23 PROCEDURE — 94761 N-INVAS EAR/PLS OXIMETRY MLT: CPT

## 2018-11-23 PROCEDURE — 85610 PROTHROMBIN TIME: CPT

## 2018-11-23 PROCEDURE — 83735 ASSAY OF MAGNESIUM: CPT | Mod: 91

## 2018-11-23 PROCEDURE — 99233 PR SUBSEQUENT HOSPITAL CARE,LEVL III: ICD-10-PCS | Mod: ,,, | Performed by: INTERNAL MEDICINE

## 2018-11-23 PROCEDURE — 63600175 PHARM REV CODE 636 W HCPCS: Performed by: TRANSPLANT SURGERY

## 2018-11-23 PROCEDURE — 99233 SBSQ HOSP IP/OBS HIGH 50: CPT | Mod: 24,,, | Performed by: SURGERY

## 2018-11-23 PROCEDURE — 99233 PR SUBSEQUENT HOSPITAL CARE,LEVL III: ICD-10-PCS | Mod: 24,,, | Performed by: SURGERY

## 2018-11-23 PROCEDURE — 85025 COMPLETE CBC W/AUTO DIFF WBC: CPT

## 2018-11-23 PROCEDURE — 83735 ASSAY OF MAGNESIUM: CPT

## 2018-11-23 PROCEDURE — 20000000 HC ICU ROOM

## 2018-11-23 PROCEDURE — 80069 RENAL FUNCTION PANEL: CPT

## 2018-11-23 PROCEDURE — S0028 INJECTION, FAMOTIDINE, 20 MG: HCPCS | Performed by: SURGERY

## 2018-11-23 PROCEDURE — 99233 PR SUBSEQUENT HOSPITAL CARE,LEVL III: ICD-10-PCS | Mod: ,,, | Performed by: PSYCHIATRY & NEUROLOGY

## 2018-11-23 PROCEDURE — 82248 BILIRUBIN DIRECT: CPT

## 2018-11-23 PROCEDURE — 97530 THERAPEUTIC ACTIVITIES: CPT

## 2018-11-23 PROCEDURE — 99233 SBSQ HOSP IP/OBS HIGH 50: CPT | Mod: ,,, | Performed by: PSYCHIATRY & NEUROLOGY

## 2018-11-23 PROCEDURE — 80100014 HC HEMODIALYSIS 1:1

## 2018-11-23 PROCEDURE — 80197 ASSAY OF TACROLIMUS: CPT

## 2018-11-23 PROCEDURE — G8997 SWALLOW GOAL STATUS: HCPCS | Mod: CK

## 2018-11-23 PROCEDURE — 92610 EVALUATE SWALLOWING FUNCTION: CPT

## 2018-11-23 RX ORDER — URSODIOL 300 MG/1
300 CAPSULE ORAL 2 TIMES DAILY
Status: DISCONTINUED | OUTPATIENT
Start: 2018-11-23 | End: 2018-12-25

## 2018-11-23 RX ORDER — TACROLIMUS 0.5 MG/1
0.5 CAPSULE ORAL 2 TIMES DAILY
Status: DISCONTINUED | OUTPATIENT
Start: 2018-11-23 | End: 2018-11-24

## 2018-11-23 RX ORDER — TACROLIMUS 1 MG/1
1 CAPSULE ORAL 2 TIMES DAILY
Status: DISCONTINUED | OUTPATIENT
Start: 2018-11-23 | End: 2018-11-23

## 2018-11-23 RX ORDER — SODIUM CHLORIDE 9 MG/ML
INJECTION, SOLUTION INTRAVENOUS ONCE
Status: DISCONTINUED | OUTPATIENT
Start: 2018-11-23 | End: 2018-11-26

## 2018-11-23 RX ORDER — SODIUM CHLORIDE 9 MG/ML
INJECTION, SOLUTION INTRAVENOUS
Status: DISCONTINUED | OUTPATIENT
Start: 2018-11-23 | End: 2018-11-26

## 2018-11-23 RX ADMIN — GANCICLOVIR SODIUM 419 MG: 500 INJECTION, POWDER, LYOPHILIZED, FOR SOLUTION INTRAVENOUS at 08:11

## 2018-11-23 RX ADMIN — ISAVUCONAZONIUM SULFATE 372 MG: 74.4 INJECTION, POWDER, LYOPHILIZED, FOR SOLUTION INTRAVENOUS at 10:11

## 2018-11-23 RX ADMIN — METRONIDAZOLE 500 MG: 500 INJECTION, SOLUTION INTRAVENOUS at 08:11

## 2018-11-23 RX ADMIN — HYDROCORTISONE SODIUM SUCCINATE 50 MG: 100 INJECTION, POWDER, FOR SOLUTION INTRAMUSCULAR; INTRAVENOUS at 06:11

## 2018-11-23 RX ADMIN — DAPTOMYCIN 980 MG: 500 INJECTION, POWDER, LYOPHILIZED, FOR SOLUTION INTRAVENOUS at 11:11

## 2018-11-23 RX ADMIN — RAMELTEON 8 MG: 8 TABLET, FILM COATED ORAL at 09:11

## 2018-11-23 RX ADMIN — QUETIAPINE FUMARATE 50 MG: 25 TABLET, FILM COATED ORAL at 09:11

## 2018-11-23 RX ADMIN — URSODIOL 300 MG: 300 CAPSULE ORAL at 08:11

## 2018-11-23 RX ADMIN — HEPARIN SODIUM 5000 UNITS: 5000 INJECTION, SOLUTION INTRAVENOUS; SUBCUTANEOUS at 09:11

## 2018-11-23 RX ADMIN — CEFEPIME 1 G: 1 INJECTION, POWDER, FOR SOLUTION INTRAMUSCULAR; INTRAVENOUS at 10:11

## 2018-11-23 RX ADMIN — HYDROCORTISONE SODIUM SUCCINATE 50 MG: 100 INJECTION, POWDER, FOR SOLUTION INTRAMUSCULAR; INTRAVENOUS at 09:11

## 2018-11-23 RX ADMIN — FAMOTIDINE 20 MG: 10 INJECTION, SOLUTION INTRAVENOUS at 08:11

## 2018-11-23 RX ADMIN — METRONIDAZOLE 500 MG: 500 INJECTION, SOLUTION INTRAVENOUS at 02:11

## 2018-11-23 RX ADMIN — SODIUM GLYCOLATE 30 MMOL: 216 INJECTION, SOLUTION INTRAVENOUS at 10:11

## 2018-11-23 RX ADMIN — HEPARIN SODIUM 5000 UNITS: 5000 INJECTION, SOLUTION INTRAVENOUS; SUBCUTANEOUS at 02:11

## 2018-11-23 RX ADMIN — HYDROCORTISONE SODIUM SUCCINATE 50 MG: 100 INJECTION, POWDER, FOR SOLUTION INTRAMUSCULAR; INTRAVENOUS at 02:11

## 2018-11-23 RX ADMIN — TACROLIMUS 0.5 MG: 0.5 CAPSULE ORAL at 05:11

## 2018-11-23 RX ADMIN — CEFEPIME 1 G: 1 INJECTION, POWDER, FOR SOLUTION INTRAMUSCULAR; INTRAVENOUS at 09:11

## 2018-11-23 RX ADMIN — METRONIDAZOLE 500 MG: 500 INJECTION, SOLUTION INTRAVENOUS at 05:11

## 2018-11-23 NOTE — ASSESSMENT & PLAN NOTE
Managed per LTS.   avoid hypoglycemia  Optimize BG control for surgical wound healing.     Lab Results   Component Value Date    ALT 51 (H) 11/23/2018    AST 36 11/23/2018     (H) 11/23/2018    ALKPHOS 230 (H) 11/23/2018    BILITOT 12.1 (H) 11/23/2018

## 2018-11-23 NOTE — PT/OT/SLP PROGRESS
Occupational Therapy   Treatment    Name: Femi Enciso  MRN: [id number]  Admitting Diagnosis:  Acute encephalopathy s/p OLTX 5 Days Post-Op    Recommendations:     Discharge Recommendations: nursing facility, skilled  Discharge Equipment Recommendations:  (TBD closer to d/c)  Barriers to discharge:  None    Subjective     Pain/Comfort:  · Pain Rating 1: 0/10  · Pain Rating Post-Intervention 1: 0/10    Objective:     Communicated with: RN prior to session.  Patient found with  blood pressure cuff, pulse ox (continuous), telemetry, oxygen, JOSE A drain, arterial line, central line, restraints(SC Kun catheter) upon OT entry to room.    General Precautions: Standard, fall   Orthopedic Precautions:N/A   Braces:       Occupational Performance:    Bed Mobility:    · Patient completed Rolling/Turning to Right with total assistance  · Patient completed Scooting/Bridging with maximal assistance  · Patient completed Supine to Sit with total assistance  · Patient completed Sit to Supine with total assistance     Functional Mobility/Transfers:  · Patient completed Sit <> Stand Transfer with maximal assistance and of 2 persons  with  no assistive device   · Functional Mobility: NT    Activities of Daily Living:  · Grooming: minimum assistance seated EOB; unable to complete in standing  · Upper Body Dressing: maximal assistance    · Lower Body Dressing: total assistance    · Toileting: total assistance        AMPAC 6 Click ADL: 14    Treatment & Education:  Pt ed on OT POC  Daily orientation provided  Pt sat EOB x 10 min with CGA while engaged in self-care tasks  Pt stood with max A x 2 with FFP during pericare and linen change  Pt/family ed on ROM ex's to be performed 2-3x daily  Restraints left unattached with RN approval and family present    Patient left with bed in chair position with all lines intact, call button in reach, rn notified and spouse and mother present  Education:    Assessment:     Femi Enciso is a 53 y.o. male with  a medical diagnosis of Acute encephalopathy.  He presents with the following performance deficits affecting function are weakness, gait instability, impaired endurance, impaired balance, impaired cardiopulmonary response to activity, decreased safety awareness, impaired self care skills, impaired functional mobilty.     Rehab Prognosis:  Good; patient would benefit from acute skilled OT services to address these deficits and reach maximum level of function.       Plan:     Patient to be seen 4 x/week to address the above listed problems via self-care/home management, therapeutic activities, therapeutic exercises  · Plan of Care Expires: 12/21/18  · Plan of Care Reviewed with: patient, spouse, mother    This Plan of care has been discussed with the patient who was involved in its development and understands and is in agreement with the identified goals and treatment plan    GOALS:   Multidisciplinary Problems     Occupational Therapy Goals        Problem: Occupational Therapy Goal    Goal Priority Disciplines Outcome Interventions   Occupational Therapy Goal     OT, PT/OT Ongoing (interventions implemented as appropriate)    Description:  Goals to be met by: 12/5/18     Patient will increase functional independence with ADLs by performing:    UE Dressing with Supervision.  LE Dressing with Minimal Assistance.  Grooming while standing at sink with Stand-by Assistance.  Toileting from toilet with Minimal Assistance for hygiene and clothing management.   Toilet transfer to toilet with Stand-by Assistance.  Upper extremity  theraband exercise program x  15 reps  with independence.               Problem: Occupational Therapy Goal    Goal Priority Disciplines Outcome Interventions   Occupational Therapy Goal     OT, PT/OT Ongoing (interventions implemented as appropriate)                    Time Tracking:     OT Date of Treatment: 11/23/18  OT Start Time: 1012  OT Stop Time: 1039  OT Total Time (min): 27 min    Billable  Minutes:Self Care/Home Management 23    ISACC Blake  11/23/2018

## 2018-11-23 NOTE — ASSESSMENT & PLAN NOTE
Avoid insulin stacking and hypoglycemia.  CRRT per nephrology  Lab Results   Component Value Date    CREATININE 1.0 11/23/2018

## 2018-11-23 NOTE — ASSESSMENT & PLAN NOTE
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   - s/p OHLTx 11/11/2018; intra-op SLED  - remaining anuric, but clearance numbers look ok, on SLED overnight  -hemodynamically stable, off pressors    Plan:  -HD trial today if staffing allows for it, otherwise as soon as feasible

## 2018-11-23 NOTE — PROGRESS NOTES
[facility]  Critical Care - Surgery  Progress Note    Patient Name: Femi Enciso  MRN: [id number]  Admission Date: 10/27/2018  Hospital Length of Stay: 27 days  Code Status: Full Code  Attending Provider: Femi Patel MD  Primary Care Provider: Primary Doctor No   Principal Problem: Acute encephalopathy    Subjective:     Hospital/ICU Course:  11/11: s/p liver transplant  11/12 - extubated, weaned off pressors; pulled out all 3 JOSE A drains  11/13 - CRRT held overnight; 1U Plt  11/14 -2u pRBC  11/15- 1u prbc  11/16- OR x 2 for bleeding, abthera + JOSE A x 1, 7 PRBC, 3 plt, 1 FFP  11/17 - 2 Plt, 1 FFP, 2 RBCs    Interval History/Significant Events:     On CRRT overnight, tolerated well.   To IR for RUQ drain 11/22  HDS, VSS overnight.     Follow-up For: Procedure(s) (LRB):  LAPAROTOMY, EXPLORATORY, AFTER LIVER TRANSPLANT, LIKELY  ABDOMINAL CLOSURE (N/A)    Post-Operative Day: 5 Days Post-Op    Objective:     Vital Signs (Most Recent):  Temp: 98.1 °F (36.7 °C) (11/23/18 0700)  Pulse: 95 (11/23/18 0745)  Resp: (!) 29 (11/23/18 0745)  BP: 114/73 (11/23/18 0400)  SpO2: 100 % (11/23/18 0745) Vital Signs (24h Range):  Temp:  [98 °F (36.7 °C)-99.4 °F (37.4 °C)] 98.1 °F (36.7 °C)  Pulse:  [] 95  Resp:  [16-33] 29  SpO2:  [95 %-100 %] 100 %  BP: ()/(50-78) 114/73  Arterial Line BP: ()/(41-68) 100/51     Weight: 96.2 kg (212 lb 1.3 oz)  Body mass index is 30.43 kg/m².      Intake/Output Summary (Last 24 hours) at 11/23/2018 0808  Last data filed at 11/23/2018 0700  Gross per 24 hour   Intake 7184 ml   Output 5045 ml   Net 2139 ml       Physical Exam       Constitutional: He appears well-developed.   jaundiced   HENT:   Head: Normocephalic and atraumatic.   Eyes: Scleral icterus is present.   Cardiovascular: Normal rate, regular rhythm and intact distal pulses.   Pulmonary/Chest: Effort normal. No respiratory distress.   Sats > 95 on NC   Abdominal: Soft. He exhibits no distension.   Incision  closed wityh staples, C/D/I  JOSE A drains in place with serous output.   Musculoskeletal: He exhibits edema.   Neurological:   AAOx3  Skin: Skin is warm and dry.   Jaundice improving     Lines/Drains/Airways     Central Venous Catheter Line                 Tunneled Central Line Insertion/Assessment - Double Lumen  11/07/18 1350 right internal jugular 15 days          Drain                 Closed/Suction Drain 11/16/18 1127 Right;Anterior RLQ Bulb 19 Fr. 6 days         Closed/Suction Drain 11/22/18 1219 Right Abdomen Bulb 10 Fr. less than 1 day          Arterial Line                 Arterial Line 11/22/18 1430 Right Radial less than 1 day          Peripheral Intravenous Line                 Peripheral IV - Single Lumen 11/21/18 1100 Anterior;Left Forearm 1 day         Peripheral IV - Single Lumen 11/21/18 1100 Right Antecubital 1 day                Significant Labs:    CBC/Anemia Profile:  Recent Labs   Lab 11/22/18  0310 11/23/18  0350   WBC 48.02* 24.51*   HGB 10.1* 8.2*   HCT 31.2* 24.5*   PLT 60* 38*   MCV 91 90   RDW 18.5* 18.5*        Chemistries:  Recent Labs   Lab 11/22/18  0310 11/22/18  1425 11/22/18  2109 11/23/18  0350     141  141 141 141  141  141 142  142  142   K 4.8  4.8  4.8 4.7 4.6  4.6  4.6 4.6  4.6  4.6   *  112*  112* 112* 110  110  110 110  110  110   CO2 19*  19*  19* 23 23  23  23 23  23  23   BUN 11  11  11 12 8  8  8 9  9  9   CREATININE 0.7  0.7  0.7 0.8 0.7  0.7  0.7 0.7  0.7  0.7   CALCIUM 7.7*  7.7*  7.7* 7.6* 7.6*  7.6*  7.6* 7.8*  7.8*  7.8*   ALBUMIN 1.7*  1.7*  1.7* 2.3* 2.6*  2.6*  2.6* 2.5*  2.5*  2.5*   PROT 3.7*  --   --  3.9*   BILITOT 11.9*  --   --  12.1*   ALKPHOS 290*  --   --  230*   ALT 66*  --   --  51*   AST 41*  --   --  36   MG 2.1  2.1 2.0 1.8  1.8  1.8 2.1   PHOS 2.7  2.7  2.7 2.4* 2.0*  2.0*  2.0* 2.2*  2.2*  2.2*       All pertinent labs within the past 24 hours have been reviewed.    Significant  Imaging:  I have reviewed all pertinent imaging results/findings within the past 24 hours.    Assessment/Plan:     S/P liver transplant    Assessment/Plan:   Femi Enciso is a 53 y.o. male now s/p OLT         * S/P liver transplant        53 year old male with history of ESLD 2/2 ETOH cirrhosis who is s/p liver transplant 11/11/18     Neuro  -off sedation  -pain: dilaudid, oxycodone prn  -CTH negative for acute ischemic process; will consider MRI if pt's mental status does not return to baseline  -continue seroquel qhs     CV  -HDS, off pressors  -monitor on telemetry      Resp  -extubated. sats >95 on RA  -ABGs prn  -daily CXR   -duonebs prn     Heme  -H/H 8.2/24.5  -q4 CBCs, transfuse per Tx     ID  -AF  -cultures  11/21: NGTD  -Abx: flagyl, cefepime, daptomycin     MSK  -OOBTC this AM     FEN/GI  -replace lytes prn  -s/p ex-lap 11/16 and 11/18      Endocrine  -insulin as needed  -accuchecks     Renal:  -CRRT per renal  -trend BMP     PPX:  -SQH, PPI     dispo  -continue ICU care              Critical care was time spent personally by me on the following activities: development of treatment plan with patient or surrogate and bedside caregivers, discussions with consultants, evaluation of patient's response to treatment, examination of patient, ordering and performing treatments and interventions, ordering and review of laboratory studies, ordering and review of radiographic studies, pulse oximetry, re-evaluation of patient's condition.  This critical care time did not overlap with that of any other provider or involve time for any procedures.     Adolph Pavon MD  Critical Care - Surgery  [facility]

## 2018-11-23 NOTE — HOSPITAL COURSE
54 y/o male who has Liver transplant done on 11-11-8.  11-23-18 stroke code called for AMS CT head no acute process seen and CTA head and neck ordered but not done and no MRI. Patient remains the same on 11-23-18.  11-24-18 no new imaging to see and no neuro changes

## 2018-11-23 NOTE — PROGRESS NOTES
[facility]  Infectious Disease  Progress Note    Patient Name: Femi Enciso  MRN: [id number]  Admission Date: 10/27/2018  Length of Stay: 27 days  Attending Physician: Femi Patel MD  Primary Care Provider: Primary Doctor No    Isolation Status: Contact  Assessment/Plan:      Delirium    52yo man w/a history of DM2 and alcohol dependence with associated hepatitis who was admitted on 10/27/2018 with acute liver failure (c/b PSE, HRS, hepatic hydrothorax) and ultimately underwent liver transplantation (s/p DBDLT 11/11/2018, CMV D+/R-, steroid induction, on maintenance tacro/MMF/pred; c/b post-operative delirium, VRE bacteruria, and IA hemorrhage requiring re-do ex-lap, RP/retrorenal/retrocolic hematoma evacuation, cauterization of RP bleed, partial right adrenalectomy, and temporary vac abdominal closure on 11/16/2018 and planned benign second look washout on 11/18/2018). Patient's bleeding has sabilized since cauterization but OR cultures have grown VRE.faecium confirming that this hematoma was infected. He decompensated on 11/21 with acute AMS and worsening leukocytosis despite broad appropriate cultures due to indolent IA infection (with perc-drainage of perihepatic fluid collection by IR on 11/22 with return of bilious fluid). He remains critically ill.    - would continue daptomycin as VRE isolate is sensitive and this agent will be better tolerated long term (no expected significant myelosuppression as with linezolid); weekly CPK needed and contact precautions ordered  - would continue empiric cefepime/flagyl for coverage of IA ty in bile  - await pending repeat blood cultures -- NGTD   - would continue posaconazole for mold prophylaxis per institutional protocol  - remainder of prophylaxis per protocol  - will f/u pending cultures to help tailor therapy         Anticipated Disposition: pending improvement    Thank you for your consult. I will follow-up with patient. Please contact us if  you have any additional questions.     Rosalee Ireland MD  Transplant ID Attending  [id number]    Rosalee Ireland MD  Infectious Disease  [facility]    Subjective:     Principal Problem:Acute encephalopathy    HPI: No notes on file  Interval History: Remains somewhat delirious. Afebrile. Leukocytosis improved after drain inserted in perihepatic fluid collection with bilious output.    Review of Systems   Unable to perform ROS: Mental status change     Objective:     Vital Signs (Most Recent):  Temp: 98.7 °F (37.1 °C) (11/23/18 1100)  Pulse: 89 (11/23/18 1206)  Resp: (!) 22 (11/23/18 1206)  BP: 114/73 (11/23/18 0400)  SpO2: 100 % (11/23/18 1206) Vital Signs (24h Range):  Temp:  [98.1 °F (36.7 °C)-99.4 °F (37.4 °C)] 98.7 °F (37.1 °C)  Pulse:  [] 89  Resp:  [] 22  SpO2:  [97 %-100 %] 100 %  BP: ()/(50-78) 114/73  Arterial Line BP: ()/(41-68) 109/60     Weight: 96.2 kg (212 lb 1.3 oz)  Body mass index is 30.43 kg/m².    Estimated Creatinine Clearance: 142.1 mL/min (based on SCr of 0.7 mg/dL).    Physical Exam   Constitutional: He appears well-developed. No distress.   HENT:   Head: Atraumatic.   Mouth/Throat: Oropharynx is clear and moist. No oropharyngeal exudate.   Eyes: Conjunctivae and EOM are normal. Pupils are equal, round, and reactive to light. Scleral icterus is present.   Neck: Neck supple.   Cardiovascular: Normal rate and regular rhythm. Exam reveals no friction rub.   No murmur heard.  Pulmonary/Chest: Breath sounds normal. No respiratory distress. He has no wheezes. He has no rales. He exhibits no tenderness.   Abdominal: Soft. Bowel sounds are normal. He exhibits distension. There is tenderness. There is no rebound and no guarding.   Brownish SS fluid in old JOSE A, bilious in second. [place] dressed.   Musculoskeletal: Normal range of motion. He exhibits edema.   Lymphadenopathy:     He has no cervical adenopathy.   Neurological: He is alert.   Skin: No rash noted.  No erythema.       Significant Labs:   CBC:   Recent Labs   Lab 11/22/18  0310 11/23/18  0350   WBC 48.02* 24.51*   HGB 10.1* 8.2*   HCT 31.2* 24.5*   PLT 60* 38*     CMP:   Recent Labs   Lab 11/22/18  0310 11/22/18  1425 11/22/18  2109 11/23/18  0350     141  141 141 141  141  141 142  142  142   K 4.8  4.8  4.8 4.7 4.6  4.6  4.6 4.6  4.6  4.6   *  112*  112* 112* 110  110  110 110  110  110   CO2 19*  19*  19* 23 23  23  23 23  23  23   *  125*  125* 122* 120*  120*  120* 111*  111*  111*   BUN 11  11  11 12 8  8  8 9  9  9   CREATININE 0.7  0.7  0.7 0.8 0.7  0.7  0.7 0.7  0.7  0.7   CALCIUM 7.7*  7.7*  7.7* 7.6* 7.6*  7.6*  7.6* 7.8*  7.8*  7.8*   PROT 3.7*  --   --  3.9*   ALBUMIN 1.7*  1.7*  1.7* 2.3* 2.6*  2.6*  2.6* 2.5*  2.5*  2.5*   BILITOT 11.9*  --   --  12.1*   ALKPHOS 290*  --   --  230*   AST 41*  --   --  36   ALT 66*  --   --  51*   ANIONGAP 10  10  10 6* 8  8  8 9  9  9   EGFRNONAA >60.0  >60.0  >60.0 >60.0 >60.0  >60.0  >60.0 >60.0  >60.0  >60.0       Significant Imaging: I have reviewed all pertinent imaging results/findings within the past 24 hours.

## 2018-11-23 NOTE — ASSESSMENT & PLAN NOTE
52yo man w/a history of DM2 and alcohol dependence with associated hepatitis who was admitted on 10/27/2018 with acute liver failure (c/b PSE, HRS, hepatic hydrothorax) and ultimately underwent liver transplantation (s/p DBDLT 11/11/2018, CMV D+/R-, steroid induction, on maintenance tacro/MMF/pred; c/b post-operative delirium, VRE bacteruria, and IA hemorrhage requiring re-do ex-lap, RP/retrorenal/retrocolic hematoma evacuation, cauterization of RP bleed, partial right adrenalectomy, and temporary vac abdominal closure on 11/16/2018 and planned benign second look washout on 11/18/2018). Patient's bleeding has sabilized since cauterization but OR cultures have grown VRE.faecium confirming that this hematoma was infected. He decompensated on 11/21 with acute AMS and worsening leukocytosis despite broad appropriate cultures due to an indolent likely bile leak (with perc-drainage of perihepatic fluid collection by IR on 11/22 with return of bilious fluid). He remains critically ill.    - would continue daptomycin as VRE isolate is sensitive and this agent will be better tolerated long term (no expected significant myelosuppression as with linezolid); weekly CPK needed and contact precautions ordered  - would continue empiric cefepime/flagyl for coverage of IA ty in bile  - await pending repeat blood cultures -- NGTD   - would continue posaconazole for mold prophylaxis per institutional protocol  - remainder of prophylaxis per protocol  - will f/u pending cultures to help tailor therapy

## 2018-11-23 NOTE — SUBJECTIVE & OBJECTIVE
No acute events overnight, afebrile,vitals stable. Concern for stroke vs. PE vs. air embolus when R IJ line was pulled yesterday. CT head and chest negative for stroke or PE, patient's vital signs improved but mentation remains inappropriate, disoriented.     Scheduled Meds:   ceFEPime (MAXIPIME) IVPB  1 g Intravenous Q12H    DAPTOmycin (CUBICIN)  IV  10 mg/kg Intravenous Q24H    famotidine (PF)  20 mg Intravenous BID    [START ON 11/24/2018] ganciclovir (CYTOVENE) IVPB  2.5 mg/kg (Dosing Weight) Intravenous Q24H    heparin (porcine)  5,000 Units Subcutaneous Q8H    hydrocortisone sodium succinate  50 mg Intravenous Q8H    custom IVPB builder  372 mg Intravenous Q24H    metronidazole  500 mg Intravenous Q8H    QUEtiapine  50 mg Oral QHS    sodium glyceroPHOSPHATE (GLYCOPHOS) IVPB  30 mmol Intravenous Once    ursodiol  300 mg Oral BID     Continuous Infusions:   sodium chloride 0.9% 200 mL/hr at 11/22/18 1353    sodium chloride 0.9%       PRN Meds:HYDROmorphone, magnesium sulfate IVPB, oxyCODONE, oxyCODONE, potassium phosphate IVPB, ramelteon    Review of Systems   Constitutional: Negative for activity change, appetite change, chills, fatigue and fever.   HENT: Negative for congestion, ear pain, rhinorrhea and sore throat.    Eyes: Negative for pain, discharge and visual disturbance.   Respiratory: Negative for cough, chest tightness and shortness of breath.    Cardiovascular: Negative for chest pain and palpitations.   Gastrointestinal: Negative for abdominal distention, abdominal pain, blood in stool, constipation, diarrhea, nausea and vomiting.   Genitourinary: Negative for difficulty urinating.   Musculoskeletal: Negative for back pain and neck pain.   Skin: Negative for color change and rash.   Neurological: Negative for dizziness, numbness and headaches.   Hematological: Negative for adenopathy.   Psychiatric/Behavioral: Positive for behavioral problems and confusion.   All other systems reviewed  and are negative.    Objective:     Vital Signs (Most Recent):  Temp: 98.7 °F (37.1 °C) (11/23/18 1100)  Pulse: 98 (11/23/18 1115)  Resp: (!) 24 (11/23/18 1115)  BP: 114/73 (11/23/18 0400)  SpO2: 100 % (11/23/18 1115) Vital Signs (24h Range):  Temp:  [98.1 °F (36.7 °C)-99.4 °F (37.4 °C)] 98.7 °F (37.1 °C)  Pulse:  [] 98  Resp:  [] 24  SpO2:  [95 %-100 %] 100 %  BP: ()/(50-78) 114/73  Arterial Line BP: ()/(41-68) 104/60     Weight: 96.2 kg (212 lb 1.3 oz)  Body mass index is 30.43 kg/m².    Intake/Output - Last 3 Shifts       11/21 0700 - 11/22 0659 11/22 0700 - 11/23 0659 11/23 0700 - 11/24 0659    P.O. 240 436     I.V. (mL/kg) 3610 (37.5) 6748 (70.1)     IV Piggyback 50      TPN       Total Intake(mL/kg) 3900 (40.5) 7184 (74.7)     Urine (mL/kg/hr)   10 (0)    Drains 1140 1120 400    Other 4337 4425     Stool 1 0     Total Output 5478 5545 410    Net -1578 +1639 -410           Stool Occurrence 1 x 1 x           Physical Exam   Constitutional: He is oriented to person, place, and time. He appears well-developed.   jaundiced   HENT:   Head: Normocephalic and atraumatic.   Eyes: Scleral icterus is present.   Cardiovascular: Normal rate, regular rhythm and intact distal pulses.   Pulmonary/Chest: Effort normal. No respiratory distress.   Nasal canula    Abdominal: Soft. He exhibits no distension.   Right sided JOSE A drain with dark serosanguinous output  New IR drain with serosanguinous output  Incision with clean bandage in place     Musculoskeletal: He exhibits edema.   Neurological: He is alert and oriented to person, place, and time.   Skin: Skin is warm and dry.   Jaundice improving       Laboratory:  Immunosuppressants     None        Labs within the past 24 hours have been reviewed.    Diagnostic Results:  I have personally reviewed all pertinent imaging studies.

## 2018-11-23 NOTE — SUBJECTIVE & OBJECTIVE
Interval History: remaining anuric    Review of patient's allergies indicates:   Allergen Reactions    Penicillins Nausea And Vomiting and Rash     Full body rash from cefepime as well     Current Facility-Administered Medications   Medication Frequency    0.9%  NaCl infusion PRN    0.9%  NaCl infusion Once    ceFEPIme injection 1 g Q12H    DAPTOmycin (CUBICIN) 980 mg in sodium chloride 0.9% IVPB Q24H    famotidine (PF) injection 20 mg BID    [START ON 11/24/2018] ganciclovir (CYTOVENE) 209 mg in sodium chloride 0.9% 100 mL IVPB Q24H    heparin (porcine) injection 5,000 Units Q8H    hydrocortisone sodium succinate injection 50 mg Q8H    HYDROmorphone injection 0.2 mg Q3H PRN    isavuconazonium sulfate 372 mg in dextrose 5 % 250 mL Q24H    metronidazole IVPB 500 mg Q8H    oxyCODONE immediate release tablet 5 mg Q4H PRN    oxyCODONE immediate release tablet Tab 10 mg Q4H PRN    potassium phosphate 15 mmol in dextrose 5 % 250 mL infusion BID PRN    QUEtiapine tablet 50 mg QHS    ramelteon tablet 8 mg Nightly PRN    sodium glycerophosphate 30 mmol in sodium chloride 0.9% 250 mL ivpb Once    tacrolimus capsule 0.5 mg BID    ursodiol capsule 300 mg BID       Objective:     Vital Signs (Most Recent):  Temp: 98.3 °F (36.8 °C) (11/23/18 1500)  Pulse: 92 (11/23/18 1530)  Resp: (!) 26 (11/23/18 1530)  BP: 108/65 (11/23/18 1500)  SpO2: 100 % (11/23/18 1530)  O2 Device (Oxygen Therapy): room air (11/23/18 1500) Vital Signs (24h Range):  Temp:  [98.1 °F (36.7 °C)-99.4 °F (37.4 °C)] 98.3 °F (36.8 °C)  Pulse:  [] 92  Resp:  [8-113] 26  SpO2:  [97 %-100 %] 100 %  BP: ()/(61-78) 108/65  Arterial Line BP: ()/(41-68) 107/58     Weight: 96.2 kg (212 lb 1.3 oz) (11/23/18 0500)  Body mass index is 30.43 kg/m².  Body surface area is 2.18 meters squared.    I/O last 3 completed shifts:  In: 51563 [P.O.:436; I.V.:9658]  Out: 9142 [Drains:1580; Other:7562]    Physical Exam   HENT:   Head: Atraumatic.    Neck: No JVD present.   Cardiovascular: Normal rate and regular rhythm. Exam reveals no friction rub.   Pulmonary/Chest: Effort normal and breath sounds normal.   Abdominal: Soft. He exhibits no distension.   Musculoskeletal: He exhibits no edema.   Neurological: He is alert.   Skin: Skin is warm.

## 2018-11-23 NOTE — SUBJECTIVE & OBJECTIVE
Interval History/Significant Events:     On CRRT overnight, tolerated well.   To IR for RUQ drain 11/22  HDS, VSS overnight.     Follow-up For: Procedure(s) (LRB):  LAPAROTOMY, EXPLORATORY, AFTER LIVER TRANSPLANT, LIKELY  ABDOMINAL CLOSURE (N/A)    Post-Operative Day: 5 Days Post-Op    Objective:     Vital Signs (Most Recent):  Temp: 98.1 °F (36.7 °C) (11/23/18 0700)  Pulse: 95 (11/23/18 0745)  Resp: (!) 29 (11/23/18 0745)  BP: 114/73 (11/23/18 0400)  SpO2: 100 % (11/23/18 0745) Vital Signs (24h Range):  Temp:  [98 °F (36.7 °C)-99.4 °F (37.4 °C)] 98.1 °F (36.7 °C)  Pulse:  [] 95  Resp:  [16-33] 29  SpO2:  [95 %-100 %] 100 %  BP: ()/(50-78) 114/73  Arterial Line BP: ()/(41-68) 100/51     Weight: 96.2 kg (212 lb 1.3 oz)  Body mass index is 30.43 kg/m².      Intake/Output Summary (Last 24 hours) at 11/23/2018 0808  Last data filed at 11/23/2018 0700  Gross per 24 hour   Intake 7184 ml   Output 5045 ml   Net 2139 ml       Physical Exam       Constitutional: He appears well-developed.   jaundiced   HENT:   Head: Normocephalic and atraumatic.   Eyes: Scleral icterus is present.   Cardiovascular: Normal rate, regular rhythm and intact distal pulses.   Pulmonary/Chest: Effort normal. No respiratory distress.   Sats > 95 on NC   Abdominal: Soft. He exhibits no distension.   Incision closed wityh staples, C/D/I  JOSE A drains in place with serous output.   Musculoskeletal: He exhibits edema.   Neurological:   AAOx3  Skin: Skin is warm and dry.   Jaundice improving     Lines/Drains/Airways     Central Venous Catheter Line                 Tunneled Central Line Insertion/Assessment - Double Lumen  11/07/18 1350 right internal jugular 15 days          Drain                 Closed/Suction Drain 11/16/18 1127 Right;Anterior RLQ Bulb 19 Fr. 6 days         Closed/Suction Drain 11/22/18 1219 Right Abdomen Bulb 10 Fr. less than 1 day          Arterial Line                 Arterial Line 11/22/18 1430 Right Radial less than  1 day          Peripheral Intravenous Line                 Peripheral IV - Single Lumen 11/21/18 1100 Anterior;Left Forearm 1 day         Peripheral IV - Single Lumen 11/21/18 1100 Right Antecubital 1 day                Significant Labs:    CBC/Anemia Profile:  Recent Labs   Lab 11/22/18  0310 11/23/18  0350   WBC 48.02* 24.51*   HGB 10.1* 8.2*   HCT 31.2* 24.5*   PLT 60* 38*   MCV 91 90   RDW 18.5* 18.5*        Chemistries:  Recent Labs   Lab 11/22/18  0310 11/22/18  1425 11/22/18  2109 11/23/18  0350     141  141 141 141  141  141 142  142  142   K 4.8  4.8  4.8 4.7 4.6  4.6  4.6 4.6  4.6  4.6   *  112*  112* 112* 110  110  110 110  110  110   CO2 19*  19*  19* 23 23  23  23 23  23  23   BUN 11  11  11 12 8  8  8 9  9  9   CREATININE 0.7  0.7  0.7 0.8 0.7  0.7  0.7 0.7  0.7  0.7   CALCIUM 7.7*  7.7*  7.7* 7.6* 7.6*  7.6*  7.6* 7.8*  7.8*  7.8*   ALBUMIN 1.7*  1.7*  1.7* 2.3* 2.6*  2.6*  2.6* 2.5*  2.5*  2.5*   PROT 3.7*  --   --  3.9*   BILITOT 11.9*  --   --  12.1*   ALKPHOS 290*  --   --  230*   ALT 66*  --   --  51*   AST 41*  --   --  36   MG 2.1  2.1 2.0 1.8  1.8  1.8 2.1   PHOS 2.7  2.7  2.7 2.4* 2.0*  2.0*  2.0* 2.2*  2.2*  2.2*       All pertinent labs within the past 24 hours have been reviewed.    Significant Imaging:  I have reviewed all pertinent imaging results/findings within the past 24 hours.

## 2018-11-23 NOTE — PLAN OF CARE
Pt between NSR & ST during shift.  On RA w/ O2 sats > 95%.  CRRT ran until 0200; removed total of 2L removed.  Jose A drain 1: 230cc brown/clear drainage. JOSE A drain 2: 270cc brown/red drainage.  Pt had 1 BM. Pt AOx2, experiencing some aphasia, but following commands.  Pt restrained for safety.  Plan of care reviewed w/pt & family; all questions answered.

## 2018-11-23 NOTE — PROGRESS NOTES
[facility]  Nephrology  Progress Note    Patient Name: Femi Enciso  MRN: [id number]  Admission Date: 10/27/2018  Hospital Length of Stay: 27 days  Attending Provider: Femi Patel MD   Primary Care Physician: Primary Doctor No  Principal Problem:Acute encephalopathy    Subjective:     HPI: 52 y/o man with DM2 presents to the ED with family for liver failure (likely due to EtOH abundant history of drinking - diagnosed in Sept 2018 in [place]).  He reports jaundice, generalized weakness, nausea, diarrhea, and decreased appetite since Sept 2018.  He is from [place], [place], and Dr. Sharma (patients physician) recommended bringing him to hospital for evaluation.  Patient denies any fever, chills, vomiting, chest pain, palpitations, SOB, abdominal pain.      Nephrology consulted for evaluation/management Adri.     Interval History: remaining anuric    Review of patient's allergies indicates:   Allergen Reactions    Penicillins Nausea And Vomiting and Rash     Full body rash from cefepime as well     Current Facility-Administered Medications   Medication Frequency    0.9%  NaCl infusion PRN    0.9%  NaCl infusion Once    ceFEPIme injection 1 g Q12H    DAPTOmycin (CUBICIN) 980 mg in sodium chloride 0.9% IVPB Q24H    famotidine (PF) injection 20 mg BID    [START ON 11/24/2018] ganciclovir (CYTOVENE) 209 mg in sodium chloride 0.9% 100 mL IVPB Q24H    heparin (porcine) injection 5,000 Units Q8H    hydrocortisone sodium succinate injection 50 mg Q8H    HYDROmorphone injection 0.2 mg Q3H PRN    isavuconazonium sulfate 372 mg in dextrose 5 % 250 mL Q24H    metronidazole IVPB 500 mg Q8H    oxyCODONE immediate release tablet 5 mg Q4H PRN    oxyCODONE immediate release tablet Tab 10 mg Q4H PRN    potassium phosphate 15 mmol in dextrose 5 % 250 mL infusion BID PRN    QUEtiapine tablet 50 mg QHS    ramelteon tablet 8 mg Nightly PRN    sodium glycerophosphate 30 mmol in sodium chloride 0.9% 250 mL ivpb  Once    tacrolimus capsule 0.5 mg BID    ursodiol capsule 300 mg BID       Objective:     Vital Signs (Most Recent):  Temp: 98.3 °F (36.8 °C) (11/23/18 1500)  Pulse: 92 (11/23/18 1530)  Resp: (!) 26 (11/23/18 1530)  BP: 108/65 (11/23/18 1500)  SpO2: 100 % (11/23/18 1530)  O2 Device (Oxygen Therapy): room air (11/23/18 1500) Vital Signs (24h Range):  Temp:  [98.1 °F (36.7 °C)-99.4 °F (37.4 °C)] 98.3 °F (36.8 °C)  Pulse:  [] 92  Resp:  [8-113] 26  SpO2:  [97 %-100 %] 100 %  BP: ()/(61-78) 108/65  Arterial Line BP: ()/(41-68) 107/58     Weight: 96.2 kg (212 lb 1.3 oz) (11/23/18 0500)  Body mass index is 30.43 kg/m².  Body surface area is 2.18 meters squared.    I/O last 3 completed shifts:  In: 70756 [P.O.:436; I.V.:9658]  Out: 9142 [Drains:1580; Other:7562]    Physical Exam   HENT:   Head: Atraumatic.   Neck: No JVD present.   Cardiovascular: Normal rate and regular rhythm. Exam reveals no friction rub.   Pulmonary/Chest: Effort normal and breath sounds normal.   Abdominal: Soft. He exhibits no distension.   Musculoskeletal: He exhibits no edema.   Neurological: He is alert.   Skin: Skin is warm.         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   - s/p OHLTx 11/11/2018; intra-op SLED  - remaining anuric, but clearance numbers look ok, on SLED overnight  -hemodynamically stable, off pressors    Plan:  -HD trial today if staffing allows for it, otherwise as soon as feasible           Thank you for your consult. I will follow-up with patient. Please contact us if you have any additional questions.    Petar Hoyos MD  Nephrology  [facility]

## 2018-11-23 NOTE — SUBJECTIVE & OBJECTIVE
Interval History: Remains somewhat delirious. Afebrile. Leukocytosis improved after drain inserted in perihepatic fluid collection with bilious output.    Review of Systems   Unable to perform ROS: Mental status change     Objective:     Vital Signs (Most Recent):  Temp: 98.7 °F (37.1 °C) (11/23/18 1100)  Pulse: 89 (11/23/18 1206)  Resp: (!) 22 (11/23/18 1206)  BP: 114/73 (11/23/18 0400)  SpO2: 100 % (11/23/18 1206) Vital Signs (24h Range):  Temp:  [98.1 °F (36.7 °C)-99.4 °F (37.4 °C)] 98.7 °F (37.1 °C)  Pulse:  [] 89  Resp:  [] 22  SpO2:  [97 %-100 %] 100 %  BP: ()/(50-78) 114/73  Arterial Line BP: ()/(41-68) 109/60     Weight: 96.2 kg (212 lb 1.3 oz)  Body mass index is 30.43 kg/m².    Estimated Creatinine Clearance: 142.1 mL/min (based on SCr of 0.7 mg/dL).    Physical Exam   Constitutional: He appears well-developed. No distress.   HENT:   Head: Atraumatic.   Mouth/Throat: Oropharynx is clear and moist. No oropharyngeal exudate.   Eyes: Conjunctivae and EOM are normal. Pupils are equal, round, and reactive to light. Scleral icterus is present.   Neck: Neck supple.   Cardiovascular: Normal rate and regular rhythm. Exam reveals no friction rub.   No murmur heard.  Pulmonary/Chest: Breath sounds normal. No respiratory distress. He has no wheezes. He has no rales. He exhibits no tenderness.   Abdominal: Soft. Bowel sounds are normal. He exhibits distension. There is tenderness. There is no rebound and no guarding.   Brownish SS fluid in old JOSE A, bilious in second. [place] dressed.   Musculoskeletal: Normal range of motion. He exhibits edema.   Lymphadenopathy:     He has no cervical adenopathy.   Neurological: He is alert.   Skin: No rash noted. No erythema.       Significant Labs:   CBC:   Recent Labs   Lab 11/22/18  0310 11/23/18  0350   WBC 48.02* 24.51*   HGB 10.1* 8.2*   HCT 31.2* 24.5*   PLT 60* 38*     CMP:   Recent Labs   Lab 11/22/18  0310 11/22/18  1425 11/22/18  2109 11/23/18  0350      141  141 141 141  141  141 142  142  142   K 4.8  4.8  4.8 4.7 4.6  4.6  4.6 4.6  4.6  4.6   *  112*  112* 112* 110  110  110 110  110  110   CO2 19*  19*  19* 23 23  23  23 23  23  23   *  125*  125* 122* 120*  120*  120* 111*  111*  111*   BUN 11  11  11 12 8  8  8 9  9  9   CREATININE 0.7  0.7  0.7 0.8 0.7  0.7  0.7 0.7  0.7  0.7   CALCIUM 7.7*  7.7*  7.7* 7.6* 7.6*  7.6*  7.6* 7.8*  7.8*  7.8*   PROT 3.7*  --   --  3.9*   ALBUMIN 1.7*  1.7*  1.7* 2.3* 2.6*  2.6*  2.6* 2.5*  2.5*  2.5*   BILITOT 11.9*  --   --  12.1*   ALKPHOS 290*  --   --  230*   AST 41*  --   --  36   ALT 66*  --   --  51*   ANIONGAP 10  10  10 6* 8  8  8 9  9  9   EGFRNONAA >60.0  >60.0  >60.0 >60.0 >60.0  >60.0  >60.0 >60.0  >60.0  >60.0       Significant Imaging: I have reviewed all pertinent imaging results/findings within the past 24 hours.

## 2018-11-23 NOTE — PLAN OF CARE
Problem: Occupational Therapy Goal  Goal: Occupational Therapy Goal  Outcome: Ongoing (interventions implemented as appropriate)  The above goals remain appropriate. ISACC Blake  11/23/2018

## 2018-11-23 NOTE — PT/OT/SLP PROGRESS
Physical Therapy  Co-Treatment with OT    Patient Name:  Femi Enciso   MRN:  [id number]    Recommendations:     Discharge Recommendations:  nursing facility, skilled   Discharge Equipment Recommendations: (will determine DME needs closer to discharge)   Barriers to discharge: Decreased caregiver support family will not be able to assist at current functional level.     Assessment:     Femi Enciso is a 53 y.o. male admitted with a medical diagnosis of Acute encephalopathy.  He presents with the following impairments/functional limitations:  weakness, gait instability, impaired balance, impaired endurance, impaired functional mobilty, impaired cognition, decreased coordination, decreased lower extremity function pt blanca treatment fair and will benefit from cont skilled PT 4x/wk to progress physically. Pt will need SNF placement when medically stable to maximize rehab potential. Pt was evaluated and discharged 10/30/18. Pt is s/p liver transplant 11/11, exp-lap 11/16, re-exploration 11/18/18.        Rehab Prognosis:  fair; patient would benefit from acute skilled PT services to address these deficits and reach maximum level of function.      Recent Surgery: Procedure(s) (LRB):  LAPAROTOMY, EXPLORATORY, AFTER LIVER TRANSPLANT, LIKELY  ABDOMINAL CLOSURE (N/A) 5 Days Post-Op    Plan:     During this hospitalization, patient to be seen 4 x/week to address the above listed problems via gait training, therapeutic activities, therapeutic exercises, neuromuscular re-education  · Plan of Care Expires:  12/19/18   Plan of Care Reviewed with: patient, spouse, mother    Subjective     Communicated with nurse prior to session.  Patient found supine upon PT entry to room, agreeable to treatment.      Chief Complaint: pt had no complaints during treatment.   Patient comments/goals:  To get better and go home.   Pain/Comfort:  · Pain Rating 1: 0/10  · Pain Rating Post-Intervention 1: 0/10    Patients cultural, spiritual, Scientologist  conflicts given the current situation: none    Objective:     Patient found with: telemetry, pulse ox (continuous), arterial line, restraints(R subclavian dialysis catheter, BUE restraints)     General Precautions: Standard, fall   Orthopedic Precautions:N/A   Braces:       Functional Mobility:  · Bed Mobility:   Pt needed verbal cues for hand placement and sequencing for functional mobility.   · Rolling Right: total assistance  · Supine to Sit: total assistance   · Sit to Supine: total assistance  ·   · Transfers:     · Sit to Stand:  maximal assistance and of 2 persons with no AD  ·   · Balance: pt sat on EOB x 10 min with CGA and performed ADLS with OT.      AM-PAC 6 CLICK MOBILITY  Turning over in bed (including adjusting bedclothes, sheets and blankets)?: 2  Sitting down on and standing up from a chair with arms (e.g., wheelchair, bedside commode, etc.): 2  Moving from lying on back to sitting on the side of the bed?: 2  Moving to and from a bed to a chair (including a wheelchair)?: 1  Need to walk in hospital room?: 1  Climbing 3-5 steps with a railing?: 1  Basic Mobility Total Score: 9       Therapeutic Activities and Exercises:   not performed. PT returned later in day to perform there exer but pt was asleep and RN asked that pt be left sleeping.     Patient left supine with all lines intact, call button in reach and wife and mother present..    GOALS:   Multidisciplinary Problems     Physical Therapy Goals        Problem: Physical Therapy Goal    Goal Priority Disciplines Outcome Goal Variances Interventions   Physical Therapy Goal     PT, PT/OT Ongoing (interventions implemented as appropriate)     Description:  Goals to be met by:      Patient will increase functional independence with mobility by performin. Supine to sit with Modified [place] -not met  2. Sit to stand transfer with [place] -not met  3. Bed to chair transfer with independence using no AD -not met  4. Gait  x150  feet with Supervision using LRAD. -not met  5. Lower extremity exercise program x20 reps per handout, with independence -not met                        Time Tracking:     PT Received On: 11/23/18  PT Start Time: 1019     PT Stop Time: 1042  PT Total Time (min): 23 min     Billable Minutes: Therapeutic Activity 23 min    Treatment Type: Other (see comments)(co-treatment with OT)  PT/PTA: PT     PTA Visit Number: 0     Nataliya Rushing, PT  11/23/2018

## 2018-11-23 NOTE — ASSESSMENT & PLAN NOTE
Assessment/Plan:   Femi Enciso is a 53 y.o. male now s/p OLT         * S/P liver transplant        53 year old male with history of ESLD 2/2 ETOH cirrhosis who is s/p liver transplant 11/11/18     Neuro  -off sedation  -pain: dilaudid, oxycodone prn  -CTH negative for acute ischemic process; will consider MRI if pt's mental status does not return to baseline  -continue seroquel qhs     CV  -HDS, off pressors  -monitor on telemetry      Resp  -extubated. sats >95 on RA  -ABGs prn  -daily CXR   -duonebs prn     Heme  -H/H 8.2/24.5  -q4 CBCs, transfuse per Tx     ID  -AF  -cultures  11/21: NGTD  -Abx: flagyl, cefepime, daptomycin     MSK  -OOBTC this AM     FEN/GI  -replace lytes prn  -s/p ex-lap 11/16 and 11/18      Endocrine  -insulin as needed  -accuchecks     Renal:  -CRRT per renal  -trend BMP     PPX:  -SQH, PPI     dispo  -continue ICU care

## 2018-11-23 NOTE — ASSESSMENT & PLAN NOTE
53 year old male with history of ESLD 2/2 ETOH cirrhosis who is s/p liver transplant c/b take-back x 3 for bleeding most recently 11/18    Neuro  -acute change in mental status with confusion and agitation on 11/14, controlled with PRN ativan   -monitor neuro exam - alert, responding more appropriately this morning, though still remains confused  - CT head 11/21 negative for acute infarct, will plan to re-scan and evaluate tomorrow vs. Saturday if neuro status fails to improve  -Seroquel nightly, increase to 50mg tonight     CV  -continues to be HDS off pressors  -swan tricia catheter removed  -monitor on telemetry     Resp  -Extubated, on room air  -ABGs & CXR prn  -continue to monitor O2 sats   -CXR stable      Heme  -H/H stable overnight   -INR 1.2, continue to monitor   -daily CBCs, transfuse as needed     ID  -monitor fever and WBC, 24 from 48 s/p IR drain placement yesterday   - afebrile   -f/u cultures: enterococcus faecium - sensitive to linezolid and daptomycin   -Abx: Cipro, fluconazole and daptomycin (switched from linezolid 11/21)     MSK  -PT/OT  -Encourage OOBTC    FEN/GI  -replace lytes   -Renal diet - NPO while disoriented, failed bedside swallow, will consult speech to assess  -s/p ex-lap 11/16 requiring take-back for bleeding, left open, now s/p closure 11/18  -CT chest with evidence of hematoma vs. Abscess in close proximity to the liver - s/p IR drain placement 11/22, f/u gram stain and clx      Endocrine  -insulin as needed. Monitor BG       -CRRT per renal    dispo  -continue SICU care

## 2018-11-23 NOTE — PROGRESS NOTES
[facility]  Liver Transplant  Progress Note    Patient Name: Femi Enciso  MRN: [id number]  Admission Date: 10/27/2018  Hospital Length of Stay: 27 days  Code Status: Full Code  Primary Care Provider: Primary Doctor No  Post-Op Day 5 Days Post-Op      ORGAN:   LIVER  Disease Etiology: Alcoholic Cirrhosis  Donor Type:    - Brain Death  CDC High Risk:   No  Donor CMV Status:   Donor CMV Status: Positive  Donor HBcAB:   Negative  Donor HCV Status:   Negative  Donor HBV JAVIER: Negative  Donor HCV JAVIER: Negative  Whole or Partial: Whole Liver  Biliary Anastomosis: End to End  Arterial Anatomy: Standard    Subjective:     No acute events overnight, afebrile,vitals stable. Concern for stroke vs. PE vs. air embolus when R IJ line was pulled yesterday. CT head and chest negative for stroke or PE, patient's vital signs improved but mentation remains inappropriate, disoriented.     Scheduled Meds:   ceFEPime (MAXIPIME) IVPB  1 g Intravenous Q12H    DAPTOmycin (CUBICIN)  IV  10 mg/kg Intravenous Q24H    famotidine (PF)  20 mg Intravenous BID    [START ON 2018] ganciclovir (CYTOVENE) IVPB  2.5 mg/kg (Dosing Weight) Intravenous Q24H    heparin (porcine)  5,000 Units Subcutaneous Q8H    hydrocortisone sodium succinate  50 mg Intravenous Q8H    custom IVPB builder  372 mg Intravenous Q24H    metronidazole  500 mg Intravenous Q8H    QUEtiapine  50 mg Oral QHS    sodium glyceroPHOSPHATE (GLYCOPHOS) IVPB  30 mmol Intravenous Once    ursodiol  300 mg Oral BID     Continuous Infusions:   sodium chloride 0.9% 200 mL/hr at 18 1353    sodium chloride 0.9%       PRN Meds:HYDROmorphone, magnesium sulfate IVPB, oxyCODONE, oxyCODONE, potassium phosphate IVPB, ramelteon    Review of Systems   Constitutional: Negative for activity change, appetite change, chills, fatigue and fever.   HENT: Negative for congestion, ear pain, rhinorrhea and sore throat.    Eyes: Negative for pain, discharge and visual  disturbance.   Respiratory: Negative for cough, chest tightness and shortness of breath.    Cardiovascular: Negative for chest pain and palpitations.   Gastrointestinal: Negative for abdominal distention, abdominal pain, blood in stool, constipation, diarrhea, nausea and vomiting.   Genitourinary: Negative for difficulty urinating.   Musculoskeletal: Negative for back pain and neck pain.   Skin: Negative for color change and rash.   Neurological: Negative for dizziness, numbness and headaches.   Hematological: Negative for adenopathy.   Psychiatric/Behavioral: Positive for behavioral problems and confusion.   All other systems reviewed and are negative.    Objective:     Vital Signs (Most Recent):  Temp: 98.7 °F (37.1 °C) (11/23/18 1100)  Pulse: 98 (11/23/18 1115)  Resp: (!) 24 (11/23/18 1115)  BP: 114/73 (11/23/18 0400)  SpO2: 100 % (11/23/18 1115) Vital Signs (24h Range):  Temp:  [98.1 °F (36.7 °C)-99.4 °F (37.4 °C)] 98.7 °F (37.1 °C)  Pulse:  [] 98  Resp:  [] 24  SpO2:  [95 %-100 %] 100 %  BP: ()/(50-78) 114/73  Arterial Line BP: ()/(41-68) 104/60     Weight: 96.2 kg (212 lb 1.3 oz)  Body mass index is 30.43 kg/m².    Intake/Output - Last 3 Shifts       11/21 0700 - 11/22 0659 11/22 0700 - 11/23 0659 11/23 0700 - 11/24 0659    P.O. 240 436     I.V. (mL/kg) 3610 (37.5) 6748 (70.1)     IV Piggyback 50      TPN       Total Intake(mL/kg) 3900 (40.5) 7184 (74.7)     Urine (mL/kg/hr)   10 (0)    Drains 1140 1120 400    Other 4337 4425     Stool 1 0     Total Output 5478 5545 410    Net -1578 +1639 -410           Stool Occurrence 1 x 1 x           Physical Exam   Constitutional: He is oriented to person, place, and time. He appears well-developed.   jaundiced   HENT:   Head: Normocephalic and atraumatic.   Eyes: Scleral icterus is present.   Cardiovascular: Normal rate, regular rhythm and intact distal pulses.   Pulmonary/Chest: Effort normal. No respiratory distress.   Nasal canula    Abdominal:  Soft. He exhibits no distension.   Right sided JOSE A drain with dark serosanguinous output  New IR drain with serosanguinous output  Incision with clean bandage in place     Musculoskeletal: He exhibits edema.   Neurological: He is alert and oriented to person, place, and time.   Skin: Skin is warm and dry.   Jaundice improving       Laboratory:  Immunosuppressants     None        Labs within the past 24 hours have been reviewed.    Diagnostic Results:  I have personally reviewed all pertinent imaging studies.    Assessment/Plan:   Femi Enciso is a 53 y.o. male     GI   S/P liver transplant      53 year old male with history of ESLD 2/2 ETOH cirrhosis who is s/p liver transplant c/b take-back x 3 for bleeding most recently 11/18    Neuro  -acute change in mental status with confusion and agitation on 11/14, controlled with PRN ativan   -monitor neuro exam - alert, responding more appropriately this morning, though still remains confused  - CT head 11/21 negative for acute infarct, will plan to re-scan and evaluate tomorrow vs. Saturday if neuro status fails to improve  -Seroquel nightly, increase to 50mg tonight     CV  -continues to be HDS off pressors  -swan tricia catheter removed  -monitor on telemetry     Resp  -Extubated, on room air  -ABGs & CXR prn  -continue to monitor O2 sats   -CXR stable      Heme  -H/H stable overnight   -INR 1.2, continue to monitor   -daily CBCs, transfuse as needed     ID  -monitor fever and WBC, 24 from 48 s/p IR drain placement yesterday   - afebrile   -f/u cultures: enterococcus faecium - sensitive to linezolid and daptomycin   -Abx: Cipro, fluconazole and daptomycin (switched from linezolid 11/21)     MSK  -PT/OT  -Encourage OOBTC    FEN/GI  -replace lytes   -Renal diet - NPO while disoriented, failed bedside swallow, will consult speech to assess  -s/p ex-lap 11/16 requiring take-back for bleeding, left open, now s/p closure 11/18  -CT chest with evidence of hematoma vs. Abscess in  close proximity to the liver - s/p IR drain placement 11/22, f/u gram stain and clx      Endocrine  -insulin as needed. Monitor BG       -CRRT per renal    dispo  -continue SICU care         The patients clinical status was discussed at multidisplinary rounds, involving transplant surgery, transplant medicine, pharmacy, nursing, nutrition, and social work.    Crys Flores MD  Liver Transplant  [facility]

## 2018-11-23 NOTE — PLAN OF CARE
"Problem: Patient Care Overview  Goal: Plan of Care Review  Dx: Liver transplant (11/11)  hx: ESLD, ETOH abuse; Severe depression.    11/12: liver transplant; extubated. Products given in OR and HD in OR. CRRT nocturnal.  11/13: CRRT nocturnal held; lines removed. FFP x1 given. Pulled out 2 JOSE A drains.   11/14: 3 PRBC's given for hgb 6; liver US shows potential hematoma. Trend CBC. Extreme agitation/attempted to "kill self" by holding breath for as long as possible multiple times. IV ativan/haldol given. Nocturnal CRRT restarted  Potential washout  To be determined.    11/16: OR for exlap and washout--1U PRBCs and 1 plts; sent back to OR--3 U PRBCs, 1FFP, 1cryo; abd open and wound vac placed  11/17: CT chest, abd, pelvis, levo off 2 units PRBCs, 2 units platelets, 1 unit FFP   11/18: OR for closure, extubated   11/19: clear liquid diet   11/20: TPN/Lipids d/c'd, renal diet, transfer orders  11/21: Head and chest CT  11/22: Pt to IR for drain placement    Nursing:   -160      **Will go back to OR Sunday 11/18       Outcome: Ongoing (interventions implemented as appropriate)  Pt on RA, sats 100%  Afebrile, all VSS  No gtts  CRRT in place, , pt tolerating well  Pt anuric  BMx2  500mL Albumin given for hypotension  Pt to IR for RUQ drain placement, 300mL brown, reddish output per shift  RLQ JOSE A in place, 320mL brown, clear drainage per shift  Accuchecks ACHS, no coverage needed  Pt only oriented to person, follows commands, moves all extremities  Pt turned q2 to prevent skin breakdown, free from falls/injury  Plan of care reviewed with pt and spouse at bedside, all questions answered  Will continue to monitor        "

## 2018-11-23 NOTE — PROGRESS NOTES
"[facility]  Endocrinology  Progress Note    Admit Date: 10/27/2018     Reason for Consult: Management of Hyperglycemia and type 2 DM    Surgical Procedure and Date: liver transplant 11/11/18; exploratory lap and liver biopsy on 11/16/18      Diabetes diagnosis year: ~ 3-4 years ago     Home Diabetes Medications:  none; previously on metformin 1000 mg BID    How often checking glucose at home? Not checking  BG readings on regimen: n/a  Hypoglycemia on the regimen?  n/a  Missed doses on regimen? n/a    Diabetes Complications include:     DORIAN    Complicating diabetes co morbidities:   CIRRHOSIS and Glucocorticoid use       HPI:   Patient is a 53 y.o. male with a diagnosis of ESLD secondary to ETOH abuse and DEL with ESRD with RRT. Patient is now s/p liver transplant. Endocrinology consulted for BG management.       Lab Results   Component Value Date    HGBA1C 4.4 11/09/2018             Interval HPI:   Overnight events: remains in ICU. CRRT. Disoriented. BG at goal without insulin. Hydrocortisone increased to 50 mg every 8 hours.   Eating:   NPO  Nausea: No  Hypoglycemia and intervention: No  Fever: No  TPN and/or TF: No    /73 (BP Location: Right arm, Patient Position: Lying)   Pulse 95   Temp 99.2 °F (37.3 °C) (Oral)   Resp 19   Ht 5' 10" (1.778 m)   Wt 96.2 kg (212 lb 1.3 oz)   SpO2 100%   BMI 30.43 kg/m²      Labs Reviewed and Include    Recent Labs   Lab 11/23/18  0350   *  111*  111*   CALCIUM 7.8*  7.8*  7.8*   ALBUMIN 2.5*  2.5*  2.5*   PROT 3.9*     142  142   K 4.6  4.6  4.6   CO2 23  23  23     110  110   BUN 9  9  9   CREATININE 0.7  0.7  0.7   ALKPHOS 230*   ALT 51*   AST 36   BILITOT 12.1*     Lab Results   Component Value Date    WBC 24.51 (H) 11/23/2018    HGB 8.2 (L) 11/23/2018    HCT 24.5 (L) 11/23/2018    MCV 90 11/23/2018    PLT 38 (LL) 11/23/2018     No results for input(s): TSH, FREET4 in the last 168 hours.  Lab Results "   Component Value Date    HGBA1C 4.4 11/09/2018       Nutritional status:   Body mass index is 30.43 kg/m².  Lab Results   Component Value Date    ALBUMIN 2.5 (L) 11/23/2018    ALBUMIN 2.5 (L) 11/23/2018    ALBUMIN 2.5 (L) 11/23/2018     Lab Results   Component Value Date    PREALBUMIN 7 (L) 10/27/2018       Estimated Creatinine Clearance: 142.1 mL/min (based on SCr of 0.7 mg/dL).    Accu-Checks  Recent Labs     11/21/18  0224 11/21/18  0318 11/21/18  0750 11/21/18  1323 11/21/18  1814 11/21/18  2211 11/22/18  0901 11/22/18  1324 11/22/18  1708 11/22/18  2113   POCTGLUCOSE 105 112* 94 136* 125* 142* 103 145* 134* 122*       Current Medications and/or Treatments Impacting Glycemic Control  Immunotherapy:    Immunosuppressants     None        Steroids:   Hormones (From admission, onward)    Start     Stop Route Frequency Ordered    11/22/18 1400  hydrocortisone sodium succinate injection 50 mg      -- IV Every 8 hours 11/22/18 0819    11/09/18 1345  methylPREDNISolone sodium succinate injection 500 mg  (Med - Immunosuppression Induction Therapy (Methylprednisolone))      11/10 0144 IV Once pre-op 11/09/18 1236        Pressors:    Autonomic Drugs (From admission, onward)    None        Hyperglycemia/Diabetes Medications:   Antihyperglycemics (From admission, onward)    Start     Stop Route Frequency Ordered    11/21/18 0857  insulin aspart U-100 pen 1-10 Units      -- SubQ Before meals & nightly PRN 11/21/18 0757          ASSESSMENT and PLAN    Type 2 diabetes mellitus without complication    BG goal 140-180      BG monitoring every 6 hours and moderate dose correction scale.       Discharge plans- TBD     S/P liver transplant    Managed per LTS.   avoid hypoglycemia  Optimize BG control for surgical wound healing.     Lab Results   Component Value Date    ALT 54 (H) 11/21/2018    AST 29 11/21/2018     (H) 11/21/2018    ALKPHOS 233 (H) 11/21/2018    BILITOT 13.4 (H) 11/21/2018            Sequelae of  hyperalimentation    Elevating BG readings.        Adrenal cortical steroids causing adverse effect in therapeutic use    On standard steroid taper per transplant team; may elevate BG readings         DEL (acute kidney injury)    Avoid insulin stacking and hypoglycemia.  CRRT per nephrology  Lab Results   Component Value Date    CREATININE 1.1 11/21/2018            Acute renal failure    Avoid insulin stacking and hypoglycemia.         Prophylactic immunotherapy    May increase insulin resistance.            Tessa Falk NP  Endocrinology  [facility]

## 2018-11-23 NOTE — PROGRESS NOTES
[facility] [address]  Vascular Neurology  Comprehensive Stroke Center  Progress Note    Assessment/Plan:     * Acute encephalopathy    - Pt with liver failure received liver transplant. Stroke code activation 2/2 pt being non responsive   - CTH not evident of acute large ischemic changes   - pt was following commands and moving all 4 ext   - if patient not back to baseline, would rec MRI brain        Type 2 diabetes mellitus without complication    Stroke risk factor     Acute liver failure    Per transplant team          52 y/o male who has Liver transplant done on 11-11-8.  11-23-18 stroke code called for AMS CT head no acute process seen and CTA head and neck ordered but not done and no MRI. Patient remains the same on 11-23-18.      STROKE DOCUMENTATION   Acute Stroke Times   Last Known Normal Date: 11/21/18  Last Known Normal Time: 1205  Symptom Onset Date: 11/21/18  Symptom Onset Time: 1205  Stroke Team Called Date: 11/21/18  Stroke Team Called Time: 1210  Stroke Team Arrival Date: 11/21/18  Stroke Team Arrival Time: 1215  CT Interpretation Time: 1230    NIH Scale:  1a. Level Of Consciousness: 0-->Alert: keenly responsive  1b. LOC Questions: 1-->Answers one question correctly  1c. LOC Commands: 0-->Performs both tasks correctly  2. Best Gaze: 0-->Normal  3. Visual: 0-->No visual loss  4. Facial Palsy: 0-->Normal symmetrical movements  5a. Motor Arm, Left: 2-->Some effort against gravity: limb cannot get to or maintain (if cued) 90 (or 45) degrees, drifts down to bed, but has some effort against gravity  5b. Motor Arm, Right: 2-->Some effort against gravity: limb cannot get to or maintain (if cued) 90 (or 45) degrees, drifts down to bed, but has some effort against gravity  6a. Motor Leg, Left: 3-->No effort against gravity: leg falls to bed immediately  6b. Motor Leg, Right: 3-->No effort against gravity: leg falls to bed immediately  7. Limb Ataxia: 0-->Absent  8. Sensory: 0-->Normal: no sensory  loss  9. Best Language: 1-->Mild-to-moderate aphasia: some obvious loss of fluency or facility of comprehension, without significant limitation on ideas expressed or form of expression. Reduction of speech and/or comprehension, however, makes conversation. . . (see row details)  10. Dysarthria: 2-->Severe dysarthria: patients speech is so slurred as to be unintelligible in the absence of or out of proportion to any dysphasia, or is mute/anarthric  11. Extinction and Inattention (formerly Neglect): 0-->No abnormality  Total (NIH Stroke Scale): 14       Modified [place] Score: 5  [place] Coma Scale:    ABCD2 Score:    [id number] Score:   HAS -BLED Score:   ICH Score:   Hunt & Torres Classification:      Hemorrhagic change of an Ischemic Stroke: Does this patient have an ischemic stroke with hemorrhagic changes? No     Neurologic Chief Complaint: AMS    Subjective:     Interval History: Patient is seen for follow-up neurological assessment and treatment recommendations: No issues overnight remain the same CTA head and neck not done yet    HPI, Past Medical, Family, and Social History remains the same as documented in the initial encounter.     Review of Systems   Constitutional: Negative for chills and fever.   Neurological: Positive for speech difficulty and weakness.   Psychiatric/Behavioral: Positive for confusion.     Scheduled Meds:   ceFEPime (MAXIPIME) IVPB  1 g Intravenous Q12H    DAPTOmycin (CUBICIN)  IV  10 mg/kg Intravenous Q24H    famotidine (PF)  20 mg Intravenous BID    [START ON 11/24/2018] ganciclovir (CYTOVENE) IVPB  2.5 mg/kg (Dosing Weight) Intravenous Q24H    heparin (porcine)  5,000 Units Subcutaneous Q8H    hydrocortisone sodium succinate  50 mg Intravenous Q8H    custom IVPB builder  372 mg Intravenous Q24H    metronidazole  500 mg Intravenous Q8H    QUEtiapine  50 mg Oral QHS    sodium glyceroPHOSPHATE (GLYCOPHOS) IVPB  30 mmol Intravenous Once    tacrolimus  0.5 mg Sublingual BID     ursodiol  300 mg Oral BID     Continuous Infusions:  PRN Meds:HYDROmorphone, magnesium sulfate IVPB, oxyCODONE, oxyCODONE, potassium phosphate IVPB, ramelteon    Objective:     Vital Signs (Most Recent):  Temp: 98.7 °F (37.1 °C) (11/23/18 1100)  Pulse: 91 (11/23/18 1345)  Resp: (!) 22 (11/23/18 1345)  BP: 114/73 (11/23/18 0400)  SpO2: 100 % (11/23/18 1345)  BP Location: Right arm    Vital Signs Range (Last 24H):  Temp:  [98.1 °F (36.7 °C)-99.4 °F (37.4 °C)]   Pulse:  []   Resp:  [8-113]   BP: ()/(54-78)   SpO2:  [97 %-100 %]   Arterial Line BP: ()/(41-68)   BP Location: Right arm    Physical Exam   Constitutional: He appears well-developed and well-nourished.   HENT:   Head: Normocephalic and atraumatic.   Neurological: He is alert.   Confused weakness mell   Skin:   Abdominal incision with drain in place   Nursing note and vitals reviewed.      Neurological Exam:   LOC: alert  Attention Span: Good   Language: Expressive aphasia  Articulation: No dysarthria  Motor: mos all extremities    Laboratory:  CMP:   Recent Labs   Lab 11/23/18  0350   CALCIUM 7.8*  7.8*  7.8*   ALBUMIN 2.5*  2.5*  2.5*   PROT 3.9*     142  142   K 4.6  4.6  4.6   CO2 23 23  23     110  110   BUN 9  9  9   CREATININE 0.7  0.7  0.7   ALKPHOS 230*   ALT 51*   AST 36   BILITOT 12.1*     CBC:   Recent Labs   Lab 11/23/18  0350   WBC 24.51*   RBC 2.71*   HGB 8.2*   HCT 24.5*   PLT 38*   MCV 90   MCH 30.3   MCHC 33.5     Lipid Panel: No results for input(s): CHOL, LDLCALC, HDL, TRIG in the last 168 hours.  Coagulation:   Recent Labs   Lab 11/23/18  0350   INR 1.4*   APTT 36.8*     Platelet Aggregation Study: No results for input(s): PLTAGG, PLTAGINTERP, PLTAGREGLACO, ADPPLTAGGREG in the last 168 hours.  Hgb A1C: No results for input(s): HGBA1C in the last 168 hours.  TSH: No results for input(s): TSH in the last 168 hours.    Diagnostic Results     Brain Imaging   CT Head w/o contrast 11-22-18  results:  No acute large vascular territory infarct or intracranial hemorrhage definitively seen allowing for significant streak artifact.  Further evaluation/follow-up as warranted.    Periventricular white matter mild hypoattenuation, likely sequela of chronic small vessel ischemic change.    Vessel Imaging       Cardiac Imaging   2D Echo 10-29-18 results    1 - Hyperdynamic left ventricular systolic function (EF >70%).     2 - Normal right ventricular systolic function .     3 - Normal left ventricular diastolic function.     4 - Mild left atrial enlargement.     5 - The estimated PA systolic pressure is 32 mmHg.       Shyanne Salgado NP  Comprehensive Stroke Center  Department of Vascular Neurology   [facility]

## 2018-11-23 NOTE — SUBJECTIVE & OBJECTIVE
Neurologic Chief Complaint: AMS    Subjective:     Interval History: Patient is seen for follow-up neurological assessment and treatment recommendations: No issues overnight remain the same CTA head and neck not done yet    HPI, Past Medical, Family, and Social History remains the same as documented in the initial encounter.     Review of Systems   Constitutional: Negative for chills and fever.   Neurological: Positive for speech difficulty and weakness.   Psychiatric/Behavioral: Positive for confusion.     Scheduled Meds:   ceFEPime (MAXIPIME) IVPB  1 g Intravenous Q12H    DAPTOmycin (CUBICIN)  IV  10 mg/kg Intravenous Q24H    famotidine (PF)  20 mg Intravenous BID    [START ON 11/24/2018] ganciclovir (CYTOVENE) IVPB  2.5 mg/kg (Dosing Weight) Intravenous Q24H    heparin (porcine)  5,000 Units Subcutaneous Q8H    hydrocortisone sodium succinate  50 mg Intravenous Q8H    custom IVPB builder  372 mg Intravenous Q24H    metronidazole  500 mg Intravenous Q8H    QUEtiapine  50 mg Oral QHS    sodium glyceroPHOSPHATE (GLYCOPHOS) IVPB  30 mmol Intravenous Once    tacrolimus  0.5 mg Sublingual BID    ursodiol  300 mg Oral BID     Continuous Infusions:  PRN Meds:HYDROmorphone, magnesium sulfate IVPB, oxyCODONE, oxyCODONE, potassium phosphate IVPB, ramelteon    Objective:     Vital Signs (Most Recent):  Temp: 98.7 °F (37.1 °C) (11/23/18 1100)  Pulse: 91 (11/23/18 1345)  Resp: (!) 22 (11/23/18 1345)  BP: 114/73 (11/23/18 0400)  SpO2: 100 % (11/23/18 1345)  BP Location: Right arm    Vital Signs Range (Last 24H):  Temp:  [98.1 °F (36.7 °C)-99.4 °F (37.4 °C)]   Pulse:  []   Resp:  [8-113]   BP: ()/(54-78)   SpO2:  [97 %-100 %]   Arterial Line BP: ()/(41-68)   BP Location: Right arm    Physical Exam   Constitutional: He appears well-developed and well-nourished.   HENT:   Head: Normocephalic and atraumatic.   Neurological: He is alert.   Confused weakness mell   Skin:   Abdominal incision with drain in  place   Nursing note and vitals reviewed.      Neurological Exam:   LOC: alert  Attention Span: Good   Language: Expressive aphasia  Articulation: No dysarthria  Motor: mos all extremities    Laboratory:  CMP:   Recent Labs   Lab 11/23/18  0350   CALCIUM 7.8*  7.8*  7.8*   ALBUMIN 2.5*  2.5*  2.5*   PROT 3.9*     142  142   K 4.6  4.6  4.6   CO2 23  23  23     110  110   BUN 9  9  9   CREATININE 0.7  0.7  0.7   ALKPHOS 230*   ALT 51*   AST 36   BILITOT 12.1*     CBC:   Recent Labs   Lab 11/23/18 0350   WBC 24.51*   RBC 2.71*   HGB 8.2*   HCT 24.5*   PLT 38*   MCV 90   MCH 30.3   MCHC 33.5     Lipid Panel: No results for input(s): CHOL, LDLCALC, HDL, TRIG in the last 168 hours.  Coagulation:   Recent Labs   Lab 11/23/18 0350   INR 1.4*   APTT 36.8*     Platelet Aggregation Study: No results for input(s): PLTAGG, PLTAGINTERP, PLTAGREGLACO, ADPPLTAGGREG in the last 168 hours.  Hgb A1C: No results for input(s): HGBA1C in the last 168 hours.  TSH: No results for input(s): TSH in the last 168 hours.    Diagnostic Results     Brain Imaging   CT Head w/o contrast 11-22-18 results:  No acute large vascular territory infarct or intracranial hemorrhage definitively seen allowing for significant streak artifact.  Further evaluation/follow-up as warranted.    Periventricular white matter mild hypoattenuation, likely sequela of chronic small vessel ischemic change.    Vessel Imaging       Cardiac Imaging   2D Echo 10-29-18 results    1 - Hyperdynamic left ventricular systolic function (EF >70%).     2 - Normal right ventricular systolic function .     3 - Normal left ventricular diastolic function.     4 - Mild left atrial enlargement.     5 - The estimated PA systolic pressure is 32 mmHg.

## 2018-11-23 NOTE — SUBJECTIVE & OBJECTIVE
"Interval HPI:   Overnight events: remains in ICU. CRRT. Disoriented. BG at goal without insulin. Hydrocortisone increased to 50 mg every 8 hours.   Eating:   NPO  Nausea: No  Hypoglycemia and intervention: No  Fever: No  TPN and/or TF: No    /73 (BP Location: Right arm, Patient Position: Lying)   Pulse 95   Temp 99.2 °F (37.3 °C) (Oral)   Resp 19   Ht 5' 10" (1.778 m)   Wt 96.2 kg (212 lb 1.3 oz)   SpO2 100%   BMI 30.43 kg/m²     Labs Reviewed and Include    Recent Labs   Lab 11/23/18  0350   *  111*  111*   CALCIUM 7.8*  7.8*  7.8*   ALBUMIN 2.5*  2.5*  2.5*   PROT 3.9*     142  142   K 4.6  4.6  4.6   CO2 23 23 23     110  110   BUN 9  9  9   CREATININE 0.7  0.7  0.7   ALKPHOS 230*   ALT 51*   AST 36   BILITOT 12.1*     Lab Results   Component Value Date    WBC 24.51 (H) 11/23/2018    HGB 8.2 (L) 11/23/2018    HCT 24.5 (L) 11/23/2018    MCV 90 11/23/2018    PLT 38 (LL) 11/23/2018     No results for input(s): TSH, FREET4 in the last 168 hours.  Lab Results   Component Value Date    HGBA1C 4.4 11/09/2018       Nutritional status:   Body mass index is 30.43 kg/m².  Lab Results   Component Value Date    ALBUMIN 2.5 (L) 11/23/2018    ALBUMIN 2.5 (L) 11/23/2018    ALBUMIN 2.5 (L) 11/23/2018     Lab Results   Component Value Date    PREALBUMIN 7 (L) 10/27/2018       Estimated Creatinine Clearance: 142.1 mL/min (based on SCr of 0.7 mg/dL).    Accu-Checks  Recent Labs     11/21/18  0224 11/21/18  0318 11/21/18  0750 11/21/18  1323 11/21/18  1814 11/21/18  2211 11/22/18  0901 11/22/18  1324 11/22/18  1708 11/22/18  2113   POCTGLUCOSE 105 112* 94 136* 125* 142* 103 145* 134* 122*       Current Medications and/or Treatments Impacting Glycemic Control  Immunotherapy:    Immunosuppressants     None        Steroids:   Hormones (From admission, onward)    Start     Stop Route Frequency Ordered    11/22/18 1400  hydrocortisone sodium succinate injection 50 mg      -- IV Every 8 " hours 11/22/18 0819    11/09/18 1345  methylPREDNISolone sodium succinate injection 500 mg  (Med - Immunosuppression Induction Therapy (Methylprednisolone))      11/10 0144 IV Once pre-op 11/09/18 1236        Pressors:    Autonomic Drugs (From admission, onward)    None        Hyperglycemia/Diabetes Medications:   Antihyperglycemics (From admission, onward)    Start     Stop Route Frequency Ordered    11/21/18 0857  insulin aspart U-100 pen 1-10 Units      -- SubQ Before meals & nightly PRN 11/21/18 0755

## 2018-11-24 LAB
ALBUMIN SERPL BCP-MCNC: 2 G/DL
ALP SERPL-CCNC: 308 U/L
ALT SERPL W/O P-5'-P-CCNC: 61 U/L
ANION GAP SERPL CALC-SCNC: 8 MMOL/L
ANISOCYTOSIS BLD QL SMEAR: SLIGHT
APTT BLDCRRT: 26.6 SEC
AST SERPL-CCNC: 42 U/L
BASOPHILS # BLD AUTO: 0.21 K/UL
BASOPHILS NFR BLD: 0.8 %
BILIRUB DIRECT SERPL-MCNC: 8.2 MG/DL
BILIRUB SERPL-MCNC: 10.3 MG/DL
BUN SERPL-MCNC: 18 MG/DL
BURR CELLS BLD QL SMEAR: ABNORMAL
CALCIUM SERPL-MCNC: 7.5 MG/DL
CHLORIDE SERPL-SCNC: 110 MMOL/L
CO2 SERPL-SCNC: 23 MMOL/L
CREAT SERPL-MCNC: 1.2 MG/DL
DIFFERENTIAL METHOD: ABNORMAL
EOSINOPHIL # BLD AUTO: 0.3 K/UL
EOSINOPHIL NFR BLD: 1.2 %
ERYTHROCYTE [DISTWIDTH] IN BLOOD BY AUTOMATED COUNT: 18.6 %
EST. GFR  (AFRICAN AMERICAN): >60 ML/MIN/1.73 M^2
EST. GFR  (NON AFRICAN AMERICAN): >60 ML/MIN/1.73 M^2
GGT SERPL-CCNC: 512 U/L
GIANT PLATELETS BLD QL SMEAR: PRESENT
GLUCOSE SERPL-MCNC: 149 MG/DL
HCT VFR BLD AUTO: 28.3 %
HGB BLD-MCNC: 9.4 G/DL
HYPOCHROMIA BLD QL SMEAR: ABNORMAL
IMM GRANULOCYTES # BLD AUTO: 1.14 K/UL
IMM GRANULOCYTES NFR BLD AUTO: 4.2 %
INR PPP: 1.3
LYMPHOCYTES # BLD AUTO: 1.7 K/UL
LYMPHOCYTES NFR BLD: 6.1 %
MAGNESIUM SERPL-MCNC: 2 MG/DL
MCH RBC QN AUTO: 30 PG
MCHC RBC AUTO-ENTMCNC: 33.2 G/DL
MCV RBC AUTO: 90 FL
MONOCYTES # BLD AUTO: 2.2 K/UL
MONOCYTES NFR BLD: 8 %
NEUTROPHILS # BLD AUTO: 21.8 K/UL
NEUTROPHILS NFR BLD: 79.7 %
NRBC BLD-RTO: 0 /100 WBC
OVALOCYTES BLD QL SMEAR: ABNORMAL
PHOSPHATE SERPL-MCNC: 3.8 MG/DL
PLATELET # BLD AUTO: 82 K/UL
PLATELET BLD QL SMEAR: ABNORMAL
PMV BLD AUTO: 13 FL
POIKILOCYTOSIS BLD QL SMEAR: SLIGHT
POLYCHROMASIA BLD QL SMEAR: ABNORMAL
POTASSIUM SERPL-SCNC: 4.5 MMOL/L
PROT SERPL-MCNC: 3.7 G/DL
PROTHROMBIN TIME: 13.1 SEC
RBC # BLD AUTO: 3.13 M/UL
SODIUM SERPL-SCNC: 141 MMOL/L
TACROLIMUS BLD-MCNC: 2.7 NG/ML
WBC # BLD AUTO: 27.34 K/UL

## 2018-11-24 PROCEDURE — 80053 COMPREHEN METABOLIC PANEL: CPT

## 2018-11-24 PROCEDURE — 63600175 PHARM REV CODE 636 W HCPCS: Mod: JG | Performed by: INTERNAL MEDICINE

## 2018-11-24 PROCEDURE — 94761 N-INVAS EAR/PLS OXIMETRY MLT: CPT

## 2018-11-24 PROCEDURE — 20000000 HC ICU ROOM

## 2018-11-24 PROCEDURE — 25000003 PHARM REV CODE 250: Performed by: STUDENT IN AN ORGANIZED HEALTH CARE EDUCATION/TRAINING PROGRAM

## 2018-11-24 PROCEDURE — 25000003 PHARM REV CODE 250: Performed by: NURSE PRACTITIONER

## 2018-11-24 PROCEDURE — 63600175 PHARM REV CODE 636 W HCPCS: Performed by: NURSE PRACTITIONER

## 2018-11-24 PROCEDURE — S0030 INJECTION, METRONIDAZOLE: HCPCS | Performed by: SURGERY

## 2018-11-24 PROCEDURE — 99233 SBSQ HOSP IP/OBS HIGH 50: CPT | Mod: ,,, | Performed by: INTERNAL MEDICINE

## 2018-11-24 PROCEDURE — 63600175 PHARM REV CODE 636 W HCPCS: Mod: JG | Performed by: SURGERY

## 2018-11-24 PROCEDURE — 82977 ASSAY OF GGT: CPT

## 2018-11-24 PROCEDURE — 85025 COMPLETE CBC W/AUTO DIFF WBC: CPT

## 2018-11-24 PROCEDURE — 99232 SBSQ HOSP IP/OBS MODERATE 35: CPT | Mod: GC,,, | Performed by: INTERNAL MEDICINE

## 2018-11-24 PROCEDURE — 82248 BILIRUBIN DIRECT: CPT

## 2018-11-24 PROCEDURE — 99233 PR SUBSEQUENT HOSPITAL CARE,LEVL III: ICD-10-PCS | Mod: ,,, | Performed by: INTERNAL MEDICINE

## 2018-11-24 PROCEDURE — 99233 PR SUBSEQUENT HOSPITAL CARE,LEVL III: ICD-10-PCS | Mod: 24,,, | Performed by: SURGERY

## 2018-11-24 PROCEDURE — 94799 UNLISTED PULMONARY SVC/PX: CPT

## 2018-11-24 PROCEDURE — 99233 PR SUBSEQUENT HOSPITAL CARE,LEVL III: ICD-10-PCS | Mod: ,,, | Performed by: PSYCHIATRY & NEUROLOGY

## 2018-11-24 PROCEDURE — 80197 ASSAY OF TACROLIMUS: CPT

## 2018-11-24 PROCEDURE — 99232 PR SUBSEQUENT HOSPITAL CARE,LEVL II: ICD-10-PCS | Mod: GC,,, | Performed by: INTERNAL MEDICINE

## 2018-11-24 PROCEDURE — 99233 SBSQ HOSP IP/OBS HIGH 50: CPT | Mod: 24,,, | Performed by: SURGERY

## 2018-11-24 PROCEDURE — 85730 THROMBOPLASTIN TIME PARTIAL: CPT

## 2018-11-24 PROCEDURE — 25000003 PHARM REV CODE 250: Performed by: SURGERY

## 2018-11-24 PROCEDURE — 63600175 PHARM REV CODE 636 W HCPCS: Performed by: STUDENT IN AN ORGANIZED HEALTH CARE EDUCATION/TRAINING PROGRAM

## 2018-11-24 PROCEDURE — 84100 ASSAY OF PHOSPHORUS: CPT

## 2018-11-24 PROCEDURE — S0028 INJECTION, FAMOTIDINE, 20 MG: HCPCS | Performed by: SURGERY

## 2018-11-24 PROCEDURE — 63600175 PHARM REV CODE 636 W HCPCS: Performed by: TRANSPLANT SURGERY

## 2018-11-24 PROCEDURE — 63600175 PHARM REV CODE 636 W HCPCS: Performed by: SURGERY

## 2018-11-24 PROCEDURE — 83735 ASSAY OF MAGNESIUM: CPT

## 2018-11-24 PROCEDURE — 99233 SBSQ HOSP IP/OBS HIGH 50: CPT | Mod: ,,, | Performed by: PSYCHIATRY & NEUROLOGY

## 2018-11-24 PROCEDURE — 85610 PROTHROMBIN TIME: CPT

## 2018-11-24 PROCEDURE — 25000003 PHARM REV CODE 250: Performed by: INTERNAL MEDICINE

## 2018-11-24 RX ORDER — TACROLIMUS 1 MG/1
1 CAPSULE ORAL 2 TIMES DAILY
Status: DISCONTINUED | OUTPATIENT
Start: 2018-11-24 | End: 2018-11-25

## 2018-11-24 RX ADMIN — URSODIOL 300 MG: 300 CAPSULE ORAL at 08:11

## 2018-11-24 RX ADMIN — TACROLIMUS 1 MG: 1 CAPSULE ORAL at 05:11

## 2018-11-24 RX ADMIN — CEFEPIME 1 G: 1 INJECTION, POWDER, FOR SOLUTION INTRAMUSCULAR; INTRAVENOUS at 10:11

## 2018-11-24 RX ADMIN — METRONIDAZOLE 500 MG: 500 INJECTION, SOLUTION INTRAVENOUS at 08:11

## 2018-11-24 RX ADMIN — TACROLIMUS 0.5 MG: 0.5 CAPSULE ORAL at 08:11

## 2018-11-24 RX ADMIN — HYDROCORTISONE SODIUM SUCCINATE 50 MG: 100 INJECTION, POWDER, FOR SOLUTION INTRAMUSCULAR; INTRAVENOUS at 10:11

## 2018-11-24 RX ADMIN — URSODIOL 300 MG: 300 CAPSULE ORAL at 10:11

## 2018-11-24 RX ADMIN — METRONIDAZOLE 500 MG: 500 INJECTION, SOLUTION INTRAVENOUS at 05:11

## 2018-11-24 RX ADMIN — FAMOTIDINE 20 MG: 10 INJECTION, SOLUTION INTRAVENOUS at 10:11

## 2018-11-24 RX ADMIN — DAPTOMYCIN 980 MG: 500 INJECTION, POWDER, LYOPHILIZED, FOR SOLUTION INTRAVENOUS at 11:11

## 2018-11-24 RX ADMIN — HEPARIN SODIUM 5000 UNITS: 5000 INJECTION, SOLUTION INTRAVENOUS; SUBCUTANEOUS at 05:11

## 2018-11-24 RX ADMIN — FAMOTIDINE 20 MG: 10 INJECTION, SOLUTION INTRAVENOUS at 08:11

## 2018-11-24 RX ADMIN — GANCICLOVIR SODIUM 209 MG: 500 INJECTION, POWDER, LYOPHILIZED, FOR SOLUTION INTRAVENOUS at 08:11

## 2018-11-24 RX ADMIN — CEFEPIME 1 G: 1 INJECTION, POWDER, FOR SOLUTION INTRAMUSCULAR; INTRAVENOUS at 09:11

## 2018-11-24 RX ADMIN — QUETIAPINE FUMARATE 50 MG: 25 TABLET, FILM COATED ORAL at 10:11

## 2018-11-24 RX ADMIN — HEPARIN SODIUM 5000 UNITS: 5000 INJECTION, SOLUTION INTRAVENOUS; SUBCUTANEOUS at 10:11

## 2018-11-24 RX ADMIN — HEPARIN SODIUM 5000 UNITS: 5000 INJECTION, SOLUTION INTRAVENOUS; SUBCUTANEOUS at 01:11

## 2018-11-24 RX ADMIN — ISAVUCONAZONIUM SULFATE 372 MG: 74.4 INJECTION, POWDER, LYOPHILIZED, FOR SOLUTION INTRAVENOUS at 10:11

## 2018-11-24 RX ADMIN — HYDROCORTISONE SODIUM SUCCINATE 50 MG: 100 INJECTION, POWDER, FOR SOLUTION INTRAMUSCULAR; INTRAVENOUS at 05:11

## 2018-11-24 RX ADMIN — HYDROCORTISONE SODIUM SUCCINATE 50 MG: 100 INJECTION, POWDER, FOR SOLUTION INTRAMUSCULAR; INTRAVENOUS at 01:11

## 2018-11-24 RX ADMIN — METRONIDAZOLE 500 MG: 500 INJECTION, SOLUTION INTRAVENOUS at 01:11

## 2018-11-24 NOTE — CARE UPDATE
BG goal 140-180. BG at goal without insulin. Patient NPO. Hydrocortisone 50 mg every 8 hours.       BG monitoring every 6 hours and moderate dose correction scale.     Please notify endocrine with any changes in nutrition (PO, TPN, TF).        ** Please call Endocrine for any BG related issues **

## 2018-11-24 NOTE — SUBJECTIVE & OBJECTIVE
Interval History/Significant Events:     NAEON. HD trial 11/23, removed 1 L  HDS, AF, VSS.  BUN/Cr 18/1.2     Follow-up For: Procedure(s) (LRB):  LAPAROTOMY, EXPLORATORY, AFTER LIVER TRANSPLANT, LIKELY  ABDOMINAL CLOSURE (N/A)    Post-Operative Day: 6 Days Post-Op    Objective:     Vital Signs (Most Recent):  Temp: 98.1 °F (36.7 °C) (11/24/18 0300)  Pulse: 96 (11/24/18 0600)  Resp: (!) 22 (11/24/18 0600)  BP: 105/61 (11/24/18 0600)  SpO2: 99 % (11/24/18 0600) Vital Signs (24h Range):  Temp:  [98.1 °F (36.7 °C)-98.9 °F (37.2 °C)] 98.1 °F (36.7 °C)  Pulse:  [] 96  Resp:  [8-113] 22  SpO2:  [96 %-100 %] 99 %  BP: ()/(58-76) 105/61  Arterial Line BP: ()/(28-73) 130/73     Weight: 96.3 kg (212 lb 4.9 oz)  Body mass index is 30.46 kg/m².      Intake/Output Summary (Last 24 hours) at 11/24/2018 0655  Last data filed at 11/24/2018 0500  Gross per 24 hour   Intake 1475 ml   Output 2620 ml   Net -1145 ml       Physical Exam       Constitutional: He appears well-developed.   jaundiced   HENT:   Head: Normocephalic and atraumatic.   Eyes: Scleral icterus is present.   Cardiovascular: Normal rate, regular rhythm and intact distal pulses.   Pulmonary/Chest: Effort normal. No respiratory distress.   Sats > 95 on NC   Abdominal: Soft. He exhibits no distension.   Incision closed wityh staples, C/D/I  JOSE A drains in place with serous output.   Musculoskeletal: He exhibits edema.   Neurological:   AAOx3  Skin: Skin is warm and dry.   Jaundice improving     Lines/Drains/Airways     Central Venous Catheter Line                 Tunneled Central Line Insertion/Assessment - Double Lumen  11/07/18 1350 right internal jugular 16 days          Drain                 Closed/Suction Drain 11/16/18 1127 Right;Anterior RLQ Bulb 19 Fr. 7 days         Closed/Suction Drain 11/22/18 1219 Right Abdomen Bulb 10 Fr. 1 day          Arterial Line                 Arterial Line 11/22/18 1430 Right Radial 1 day          Peripheral Intravenous  Line                 Peripheral IV - Single Lumen 11/21/18 1100 Anterior;Left Forearm 2 days         Peripheral IV - Single Lumen 11/21/18 1100 Right Antecubital 2 days                Significant Labs:    CBC/Anemia Profile:  Recent Labs   Lab 11/23/18  0350 11/24/18  0422   WBC 24.51* 27.34*   HGB 8.2* 9.4*   HCT 24.5* 28.3*   PLT 38* 82*   MCV 90 90   RDW 18.5* 18.6*        Chemistries:  Recent Labs   Lab 11/23/18  0350 11/23/18  1416 11/24/18  0422     142  142 141 141  141  141   K 4.6  4.6  4.6 4.8 4.5  4.5  4.5     110  110 111* 110  110  110   CO2 23  23  23 23 23  23  23   BUN 9  9  9 16 18  18  18   CREATININE 0.7  0.7  0.7 1.0 1.2  1.2  1.2   CALCIUM 7.8*  7.8*  7.8* 7.0* 7.5*  7.5*  7.5*   ALBUMIN 2.5*  2.5*  2.5* 2.2* 2.0*  2.0*  2.0*   PROT 3.9*  --  3.7*   BILITOT 12.1*  --  10.3*   ALKPHOS 230*  --  308*   ALT 51*  --  61*   AST 36  --  42*   MG 2.1 2.1 2.0   PHOS 2.2*  2.2*  2.2* 3.8 3.8  3.8  3.8       All pertinent labs within the past 24 hours have been reviewed.    Significant Imaging:  I have reviewed all pertinent imaging results/findings within the past 24 hours.

## 2018-11-24 NOTE — SUBJECTIVE & OBJECTIVE
Scheduled Meds:   sodium chloride 0.9%   Intravenous Once    ceFEPime (MAXIPIME) IVPB  1 g Intravenous Q12H    DAPTOmycin (CUBICIN)  IV  10 mg/kg Intravenous Q24H    famotidine (PF)  20 mg Intravenous BID    ganciclovir (CYTOVENE) IVPB  2.5 mg/kg (Dosing Weight) Intravenous Q24H    heparin (porcine)  5,000 Units Subcutaneous Q8H    hydrocortisone sodium succinate  50 mg Intravenous Q8H    custom IVPB builder  372 mg Intravenous Q24H    metronidazole  500 mg Intravenous Q8H    QUEtiapine  50 mg Oral QHS    tacrolimus  0.5 mg Sublingual BID    ursodiol  300 mg Oral BID     Continuous Infusions:  PRN Meds:sodium chloride 0.9%, HYDROmorphone, oxyCODONE, oxyCODONE, potassium phosphate IVPB, ramelteon    Review of Systems   Constitutional: Negative for activity change, appetite change, chills, fatigue and fever.   HENT: Negative for congestion, ear pain, rhinorrhea and sore throat.    Eyes: Negative for pain, discharge and visual disturbance.   Respiratory: Negative for cough, chest tightness and shortness of breath.    Cardiovascular: Negative for chest pain and palpitations.   Gastrointestinal: Negative for abdominal distention, abdominal pain, blood in stool, constipation, diarrhea, nausea and vomiting.   Genitourinary: Negative for difficulty urinating.   Musculoskeletal: Negative for back pain and neck pain.   Skin: Negative for color change and rash.   Neurological: Negative for dizziness, numbness and headaches.   Hematological: Negative for adenopathy.   Psychiatric/Behavioral: Positive for behavioral problems and confusion.   All other systems reviewed and are negative.    Objective:     Vital Signs (Most Recent):  Temp: 98.1 °F (36.7 °C) (11/24/18 0700)  Pulse: 87 (11/24/18 0800)  Resp: 20 (11/24/18 0800)  BP: 120/79 (11/24/18 0800)  SpO2: 100 % (11/24/18 0800) Vital Signs (24h Range):  Temp:  [98.1 °F (36.7 °C)-98.9 °F (37.2 °C)] 98.1 °F (36.7 °C)  Pulse:  [] 87  Resp:  [8-113] 20  SpO2:  [96  %-100 %] 100 %  BP: ()/(58-79) 120/79  Arterial Line BP: ()/(28-73) 126/65     Weight: 96.3 kg (212 lb 4.9 oz)  Body mass index is 30.46 kg/m².    Intake/Output - Last 3 Shifts       11/22 0700 - 11/23 0659 11/23 0700 - 11/24 0659 11/24 0700 - 11/25 0659    P.O. 436      I.V. (mL/kg) 6748 (70.1) 975 (10.1)     Other  500     IV Piggyback       Total Intake(mL/kg) 7184 (74.7) 1475 (15.3)     Urine (mL/kg/hr)  10 (0)     Drains 1120 1110 190    Other 4425 1500     Stool 0 0     Total Output 5545 2620 190    Net +1639 -1145 -190           Stool Occurrence 1 x 1 x           Physical Exam   Constitutional: He appears well-developed.   jaundiced   HENT:   Head: Normocephalic and atraumatic.   Eyes: Scleral icterus is present.   Cardiovascular: Normal rate, regular rhythm and intact distal pulses.   Pulmonary/Chest: Effort normal. No respiratory distress.   Nasal canula    Abdominal: Soft. He exhibits no distension.   Right sided JOSE A drain with dark serosanguinous output  New IR drain with serosanguinous output  Incision with clean bandage in place     Musculoskeletal: He exhibits edema.   Neurological: He is alert.   Remains confused   Skin: Skin is warm and dry.   Jaundice improving       Laboratory:  Immunosuppressants         Stop Route Frequency     tacrolimus capsule 0.5 mg      -- SL 2 times daily        CBC:   Recent Labs   Lab 11/24/18  0422   WBC 27.34*   RBC 3.13*   HGB 9.4*   HCT 28.3*   PLT 82*   MCV 90   MCH 30.0   MCHC 33.2     CMP:   Recent Labs   Lab 11/24/18  0422   *  149*  149*   CALCIUM 7.5*  7.5*  7.5*   ALBUMIN 2.0*  2.0*  2.0*   PROT 3.7*     141  141   K 4.5  4.5  4.5   CO2 23  23  23     110  110   BUN 18 18  18   CREATININE 1.2  1.2  1.2   ALKPHOS 308*   ALT 61*   AST 42*   BILITOT 10.3*     Coagulation:   Recent Labs   Lab 11/24/18  0422   INR 1.3*   APTT 26.6     Labs within the past 24 hours have been reviewed.    Diagnostic Results:  I have  personally reviewed all pertinent imaging studies.

## 2018-11-24 NOTE — PROGRESS NOTES
[facility]  Vascular Neurology  Comprehensive Stroke Center  Progress Note    Assessment/Plan:     * Acute encephalopathy    - Pt with liver failure received liver transplant. Stroke code activation 2/2 pt being non responsive   - CTH not evident of acute large ischemic changes   - pt was following commands and moving all 4 ext   - if patient not back to baseline, would rec MRI brain        Type 2 diabetes mellitus without complication    Stroke risk factor     Acute liver failure    Per transplant team          52 y/o male who has Liver transplant done on 11-11-8.  11-23-18 stroke code called for AMS CT head no acute process seen and CTA head and neck ordered but not done and no MRI. Patient remains the same on 11-23-18.  11-24-18 no new imaging to see and no neuro changes    STROKE DOCUMENTATION   Acute Stroke Times   Last Known Normal Date: 11/21/18  Last Known Normal Time: 1205  Symptom Onset Date: 11/21/18  Symptom Onset Time: 1205  Stroke Team Called Date: 11/21/18  Stroke Team Called Time: 1210  Stroke Team Arrival Date: 11/21/18  Stroke Team Arrival Time: 1215  CT Interpretation Time: 1230    NIH Scale:  1a. Level Of Consciousness: 0-->Alert: keenly responsive  1b. LOC Questions: 1-->Answers one question correctly  1c. LOC Commands: 0-->Performs both tasks correctly  2. Best Gaze: 0-->Normal  3. Visual: 0-->No visual loss  4. Facial Palsy: 0-->Normal symmetrical movements  5a. Motor Arm, Left: 0-->No drift: limb holds 90 (or 45) degrees for full 10 secs  5b. Motor Arm, Right: 0-->No drift: limb holds 90 (or 45) degrees for full 10 secs  6a. Motor Leg, Left: 0-->No drift: leg holds 30 degree position for full 5 secs  6b. Motor Leg, Right: 0-->No drift: leg holds 30 degree position for full 5 secs  7. Limb Ataxia: 0-->Absent  8. Sensory: 0-->Normal: no sensory loss  9. Best Language: 0-->No aphasia: normal  10. Dysarthria: 0-->Normal  11. Extinction and Inattention (formerly Neglect):  0-->No abnormality  Total (NIH Stroke Scale): 1       Modified [place] Score: 5  [place] Coma Scale:    ABCD2 Score:    [id number] Score:   HAS -BLED Score:   ICH Score:   Hunt & Torres Classification:      Hemorrhagic change of an Ischemic Stroke: Does this patient have an ischemic stroke with hemorrhagic changes? No     Neurologic Chief Complaint: AMS    Subjective:     Interval History: Patient is seen for follow-up neurological assessment and treatment recommendations: No issues overnight remain the same CTA head and neck not done yet    HPI, Past Medical, Family, and Social History remains the same as documented in the initial encounter.     Review of Systems   Constitutional: Negative for chills and fever.   Neurological: Positive for speech difficulty and weakness.   Psychiatric/Behavioral: Positive for confusion.     Scheduled Meds:   sodium chloride 0.9%   Intravenous Once    ceFEPime (MAXIPIME) IVPB  1 g Intravenous Q12H    DAPTOmycin (CUBICIN)  IV  10 mg/kg Intravenous Q24H    famotidine (PF)  20 mg Intravenous BID    ganciclovir (CYTOVENE) IVPB  2.5 mg/kg (Dosing Weight) Intravenous Q24H    heparin (porcine)  5,000 Units Subcutaneous Q8H    hydrocortisone sodium succinate  50 mg Intravenous Q8H    custom IVPB builder  372 mg Intravenous Q24H    metronidazole  500 mg Intravenous Q8H    QUEtiapine  50 mg Oral QHS    tacrolimus  1 mg Sublingual BID    ursodiol  300 mg Oral BID     Continuous Infusions:  PRN Meds:sodium chloride 0.9%, HYDROmorphone, oxyCODONE, oxyCODONE, potassium phosphate IVPB, ramelteon    Objective:     Vital Signs (Most Recent):  Temp: 98.5 °F (36.9 °C) (11/24/18 1100)  Pulse: 91 (11/24/18 1230)  Resp: 19 (11/24/18 1230)  BP: 102/68 (11/24/18 1230)  SpO2: 95 % (11/24/18 1230)  BP Location: Right arm    Vital Signs Range (Last 24H):  Temp:  [98.1 °F (36.7 °C)-98.9 °F (37.2 °C)]   Pulse:  []   Resp:  [8-38]   BP: ()/(58-79)   SpO2:  [95 %-100 %]   Arterial Line BP:  ()/(28-73)   BP Location: Right arm    Physical Exam   Constitutional: He appears well-developed and well-nourished.   HENT:   Head: Normocephalic and atraumatic.   Neurological: He is alert.   Confused weakness mell   Skin:   Abdominal incision with drain in place   Nursing note and vitals reviewed.      Neurological Exam:   LOC: alert  Attention Span: Good   Language: Expressive aphasia  Articulation: No dysarthria  Motor: mos all extremities    Laboratory:  CMP:   Recent Labs   Lab 11/24/18 0422   CALCIUM 7.5*  7.5*  7.5*   ALBUMIN 2.0*  2.0*  2.0*   PROT 3.7*     141  141   K 4.5  4.5  4.5   CO2 23  23  23     110  110   BUN 18  18  18   CREATININE 1.2  1.2  1.2   ALKPHOS 308*   ALT 61*   AST 42*   BILITOT 10.3*     CBC:   Recent Labs   Lab 11/24/18 0422   WBC 27.34*   RBC 3.13*   HGB 9.4*   HCT 28.3*   PLT 82*   MCV 90   MCH 30.0   MCHC 33.2     Lipid Panel: No results for input(s): CHOL, LDLCALC, HDL, TRIG in the last 168 hours.  Coagulation:   Recent Labs   Lab 11/24/18 0422   INR 1.3*   APTT 26.6     Platelet Aggregation Study: No results for input(s): PLTAGG, PLTAGINTERP, PLTAGREGLACO, ADPPLTAGGREG in the last 168 hours.  Hgb A1C: No results for input(s): HGBA1C in the last 168 hours.  TSH: No results for input(s): TSH in the last 168 hours.    Diagnostic Results     Brain Imaging   CT Head w/o contrast 11-22-18 results:  No acute large vascular territory infarct or intracranial hemorrhage definitively seen allowing for significant streak artifact.  Further evaluation/follow-up as warranted.    Periventricular white matter mild hypoattenuation, likely sequela of chronic small vessel ischemic change.    Vessel Imaging       Cardiac Imaging   2D Echo 10-29-18 results    1 - Hyperdynamic left ventricular systolic function (EF >70%).     2 - Normal right ventricular systolic function .     3 - Normal left ventricular diastolic function.     4 - Mild left atrial enlargement.      5 - The estimated PA systolic pressure is 32 mmHg.       Shyanne Salgado NP  [facility]  Department of Vascular Neurology   [facility]

## 2018-11-24 NOTE — PLAN OF CARE
Problem: SLP Goal  Goal: SLP Goal  Speech Language Pathology Goals  Goals expected to be met by 11/30/2018  1. Pt will participate in ongoing swallow assessment         Outcome: Ongoing (interventions implemented as appropriate)  Pt seen for bedside swallow study. He presented with overt S/S aspiration and decreased attention impacted efficacy of compensatory strategies. REC: Continue NPO and ST to continue to follow. Pleasure feeds of puree deemed safe provided 1:1 assistance for strict aspiration precautions and use of single, small bites, and double swallows between bites.  Please Continue to monitor for signs and symptoms of aspiration and discontinue oral feeding should you notice any of the following: watery eyes, reddened facial area, wet vocal quality, increased work of breathing, change in respiratory status, increased congestion, coughing, fever, and or change in cognition.

## 2018-11-24 NOTE — SUBJECTIVE & OBJECTIVE
Neurologic Chief Complaint: AMS    Subjective:     Interval History: Patient is seen for follow-up neurological assessment and treatment recommendations: No issues overnight remain the same CTA head and neck not done yet    HPI, Past Medical, Family, and Social History remains the same as documented in the initial encounter.     Review of Systems   Constitutional: Negative for chills and fever.   Neurological: Positive for speech difficulty and weakness.   Psychiatric/Behavioral: Positive for confusion.     Scheduled Meds:   sodium chloride 0.9%   Intravenous Once    ceFEPime (MAXIPIME) IVPB  1 g Intravenous Q12H    DAPTOmycin (CUBICIN)  IV  10 mg/kg Intravenous Q24H    famotidine (PF)  20 mg Intravenous BID    ganciclovir (CYTOVENE) IVPB  2.5 mg/kg (Dosing Weight) Intravenous Q24H    heparin (porcine)  5,000 Units Subcutaneous Q8H    hydrocortisone sodium succinate  50 mg Intravenous Q8H    custom IVPB builder  372 mg Intravenous Q24H    metronidazole  500 mg Intravenous Q8H    QUEtiapine  50 mg Oral QHS    tacrolimus  1 mg Sublingual BID    ursodiol  300 mg Oral BID     Continuous Infusions:  PRN Meds:sodium chloride 0.9%, HYDROmorphone, oxyCODONE, oxyCODONE, potassium phosphate IVPB, ramelteon    Objective:     Vital Signs (Most Recent):  Temp: 98.5 °F (36.9 °C) (11/24/18 1100)  Pulse: 91 (11/24/18 1230)  Resp: 19 (11/24/18 1230)  BP: 102/68 (11/24/18 1230)  SpO2: 95 % (11/24/18 1230)  BP Location: Right arm    Vital Signs Range (Last 24H):  Temp:  [98.1 °F (36.7 °C)-98.9 °F (37.2 °C)]   Pulse:  []   Resp:  [8-38]   BP: ()/(58-79)   SpO2:  [95 %-100 %]   Arterial Line BP: ()/(28-73)   BP Location: Right arm    Physical Exam   Constitutional: He appears well-developed and well-nourished.   HENT:   Head: Normocephalic and atraumatic.   Neurological: He is alert.   Confused weakness mell   Skin:   Abdominal incision with drain in place   Nursing note and vitals  reviewed.      Neurological Exam:   LOC: alert  Attention Span: Good   Language: Expressive aphasia  Articulation: No dysarthria  Motor: mos all extremities    Laboratory:  CMP:   Recent Labs   Lab 11/24/18 0422   CALCIUM 7.5*  7.5*  7.5*   ALBUMIN 2.0*  2.0*  2.0*   PROT 3.7*     141  141   K 4.5  4.5  4.5   CO2 23  23  23     110  110   BUN 18 18 18   CREATININE 1.2  1.2  1.2   ALKPHOS 308*   ALT 61*   AST 42*   BILITOT 10.3*     CBC:   Recent Labs   Lab 11/24/18 0422   WBC 27.34*   RBC 3.13*   HGB 9.4*   HCT 28.3*   PLT 82*   MCV 90   MCH 30.0   MCHC 33.2     Lipid Panel: No results for input(s): CHOL, LDLCALC, HDL, TRIG in the last 168 hours.  Coagulation:   Recent Labs   Lab 11/24/18 0422   INR 1.3*   APTT 26.6     Platelet Aggregation Study: No results for input(s): PLTAGG, PLTAGINTERP, PLTAGREGLACO, ADPPLTAGGREG in the last 168 hours.  Hgb A1C: No results for input(s): HGBA1C in the last 168 hours.  TSH: No results for input(s): TSH in the last 168 hours.    Diagnostic Results     Brain Imaging   CT Head w/o contrast 11-22-18 results:  No acute large vascular territory infarct or intracranial hemorrhage definitively seen allowing for significant streak artifact.  Further evaluation/follow-up as warranted.    Periventricular white matter mild hypoattenuation, likely sequela of chronic small vessel ischemic change.    Vessel Imaging       Cardiac Imaging   2D Echo 10-29-18 results    1 - Hyperdynamic left ventricular systolic function (EF >70%).     2 - Normal right ventricular systolic function .     3 - Normal left ventricular diastolic function.     4 - Mild left atrial enlargement.     5 - The estimated PA systolic pressure is 32 mmHg.

## 2018-11-24 NOTE — PROGRESS NOTES
[facility]  Liver Transplant  Progress Note    Patient Name: Femi Enciso  MRN: [id number]  Admission Date: 10/27/2018  Hospital Length of Stay: 28 days  Code Status: Full Code  Primary Care Provider: Primary Doctor No  Post-Op Day 6 Days Post-Op    Admit Diagnosis: ESLD  Procedures: OLTx    ORGAN:   LIVER  Disease Etiology: Alcoholic Cirrhosis  Donor Type:    - Brain Death  CDC High Risk:   No  Donor CMV Status:   Donor CMV Status: Positive  Donor HBcAB:   Negative  Donor HCV Status:   Negative  Donor HBV JAVIER: Negative  Donor HCV JAVIER: Negative  Whole or Partial: Whole Liver  Biliary Anastomosis: End to End  Arterial Anatomy: Standard    Subjective:     Scheduled Meds:   sodium chloride 0.9%   Intravenous Once    ceFEPime (MAXIPIME) IVPB  1 g Intravenous Q12H    DAPTOmycin (CUBICIN)  IV  10 mg/kg Intravenous Q24H    famotidine (PF)  20 mg Intravenous BID    ganciclovir (CYTOVENE) IVPB  2.5 mg/kg (Dosing Weight) Intravenous Q24H    heparin (porcine)  5,000 Units Subcutaneous Q8H    hydrocortisone sodium succinate  50 mg Intravenous Q8H    custom IVPB builder  372 mg Intravenous Q24H    metronidazole  500 mg Intravenous Q8H    QUEtiapine  50 mg Oral QHS    tacrolimus  0.5 mg Sublingual BID    ursodiol  300 mg Oral BID     Continuous Infusions:  PRN Meds:sodium chloride 0.9%, HYDROmorphone, oxyCODONE, oxyCODONE, potassium phosphate IVPB, ramelteon    Review of Systems   Constitutional: Negative for activity change, appetite change, chills, fatigue and fever.   HENT: Negative for congestion, ear pain, rhinorrhea and sore throat.    Eyes: Negative for pain, discharge and visual disturbance.   Respiratory: Negative for cough, chest tightness and shortness of breath.    Cardiovascular: Negative for chest pain and palpitations.   Gastrointestinal: Negative for abdominal distention, abdominal pain, blood in stool, constipation, diarrhea, nausea and vomiting.   Genitourinary: Negative for  difficulty urinating.   Musculoskeletal: Negative for back pain and neck pain.   Skin: Negative for color change and rash.   Neurological: Negative for dizziness, numbness and headaches.   Hematological: Negative for adenopathy.   Psychiatric/Behavioral: Positive for behavioral problems and confusion.   All other systems reviewed and are negative.    Objective:     Vital Signs (Most Recent):  Temp: 98.1 °F (36.7 °C) (11/24/18 0700)  Pulse: 87 (11/24/18 0800)  Resp: 20 (11/24/18 0800)  BP: 120/79 (11/24/18 0800)  SpO2: 100 % (11/24/18 0800) Vital Signs (24h Range):  Temp:  [98.1 °F (36.7 °C)-98.9 °F (37.2 °C)] 98.1 °F (36.7 °C)  Pulse:  [] 87  Resp:  [8-113] 20  SpO2:  [96 %-100 %] 100 %  BP: ()/(58-79) 120/79  Arterial Line BP: ()/(28-73) 126/65     Weight: 96.3 kg (212 lb 4.9 oz)  Body mass index is 30.46 kg/m².    Intake/Output - Last 3 Shifts       11/22 0700 - 11/23 0659 11/23 0700 - 11/24 0659 11/24 0700 - 11/25 0659    P.O. 436      I.V. (mL/kg) 6748 (70.1) 975 (10.1)     Other  500     IV Piggyback       Total Intake(mL/kg) 7184 (74.7) 1475 (15.3)     Urine (mL/kg/hr)  10 (0)     Drains 1120 1110 190    Other 4425 1500     Stool 0 0     Total Output 5545 2620 190    Net +1639 -1145 -190           Stool Occurrence 1 x 1 x           Physical Exam   Constitutional: He appears well-developed.   jaundiced   HENT:   Head: Normocephalic and atraumatic.   Eyes: Scleral icterus is present.   Cardiovascular: Normal rate, regular rhythm and intact distal pulses.   Pulmonary/Chest: Effort normal. No respiratory distress.   Nasal canula    Abdominal: Soft. He exhibits no distension.   Right sided JOSE A drain with dark serosanguinous output  New IR drain with serosanguinous output  Incision with clean bandage in place     Musculoskeletal: He exhibits edema.   Neurological: He is alert.   Remains confused   Skin: Skin is warm and dry.   Jaundice improving       Laboratory:  Immunosuppressants         Stop  Route Frequency     tacrolimus capsule 0.5 mg      -- SL 2 times daily        CBC:   Recent Labs   Lab 11/24/18 0422   WBC 27.34*   RBC 3.13*   HGB 9.4*   HCT 28.3*   PLT 82*   MCV 90   MCH 30.0   MCHC 33.2     CMP:   Recent Labs   Lab 11/24/18 0422   *  149*  149*   CALCIUM 7.5*  7.5*  7.5*   ALBUMIN 2.0*  2.0*  2.0*   PROT 3.7*     141  141   K 4.5  4.5  4.5   CO2 23  23  23     110  110   BUN 18 18 18   CREATININE 1.2  1.2  1.2   ALKPHOS 308*   ALT 61*   AST 42*   BILITOT 10.3*     Coagulation:   Recent Labs   Lab 11/24/18 0422   INR 1.3*   APTT 26.6     Labs within the past 24 hours have been reviewed.    Diagnostic Results:  I have personally reviewed all pertinent imaging studies.    Assessment/Plan:   Femi Enciso is a 53 y.o. male     Derm   Acute maculopapular rash    - morbilliform rash likely from drug reaction possibly Cefepime (10/27-10/30).  Per patient rash is very pruritic.  He has been using steroid cream w minimal relief.  He stated significant improvement w receiving Hydroxyzine.  Monitor.         Renal/   Metabolic acidosis    - well managed on sodium bicarb.  Trend daily.        DEL (acute kidney injury)    - DEL over past 2 weeks.  Nephrology was consulted.  Pt not a candidate for kidney transplant before 12-4-18.    - pt tolerating HD w no fluid removed today given hypotension.    - pt w offer for liver transplant this evening.  He will receive intra-op HD.    - cont strict I/O's.         Acute renal failure    - del past 2 weeks.  Pt on Midodrine.  Last HD 11/9/18- 0 fluid removed 2/2 hypotension.  - pt will receive intra op HD.         Hematology   Coagulopathy    - to improve w transplant.  No evidence of acute bleeding, no hematemesis or BRBPR.         Endocrine   Moderate protein malnutrition    - temporal muscle wasting noted.  Decreased appetite and energy over past week worse.   Dietician consulted on admit.  Will consult post transplant as  "needed.         GI   S/P liver transplant    53 year old male with history of ESLD 2/2 ETOH cirrhosis who is s/p liver transplant c/b take-back x 3 for bleeding most recently 11/18    Neuro  -acute change in mental status with confusion and agitation on 11/14, controlled with PRN ativan   -monitor neuro exam - alert, responding more appropriately this morning, had another more "severe" bout of confusion at 2 am this am  - CT head 11/21 negative for acute infarct, will plan to re-scan and evaluate tomorrow vs. Saturday if neuro status fails to improve  -Seroquel nightly, remain at 50mg tonight     CV  -continues to be HDS off pressors  -swan tricia catheter removed  -monitor on telemetry     Resp  -Extubated, on room air  -ABGs & CXR prn  -continue to monitor O2 sats   -CXR stable      Heme  -H/H stable overnight   -INR 1.2, continue to monitor   -daily CBCs, transfuse as needed     ID  -monitor fever and WBC, 24 from 48 s/p IR drain placement yesterday   - afebrile   -f/u cultures: enterococcus faecium - sensitive to linezolid and daptomycin   -Abx: Cipro, fluconazole and daptomycin (switched from linezolid 11/21)     MSK  -PT/OT  -Encourage OOBTC    FEN/GI  -replace lytes   -Renal diet - NPO while disoriented, failed bedside swallow, will consult speech to assess  -s/p ex-lap 11/16 requiring take-back for bleeding, left open, now s/p closure 11/18  -CT chest with evidence of hematoma vs. Abscess in close proximity to the liver - s/p IR drain placement 11/22, f/u gram stain and clx      Endocrine  -insulin as needed. Monitor BG       -Tolerated HD    dispo  -continue SICU care for delerium     Jaundice    - t bili 37.5.  Monitor.           The patients clinical status was discussed at multidisplinary rounds, involving transplant surgery, transplant medicine, pharmacy, nursing, nutrition, and social work.    Paul Diaz MD  Liver Transplant  [facility]  "

## 2018-11-24 NOTE — PT/OT/SLP EVAL
Speech Language Pathology Evaluation  Bedside Swallow    Patient Name:  Femi Enciso   MRN:  [id number]  Admitting Diagnosis: Acute encephalopathy. The primary encounter diagnosis was Jaundice. Diagnoses of Weakness, Decreased appetite, Nausea, Acute renal failure, unspecified acute renal failure type, Acute liver failure without hepatic coma, Acute liver failure, Anasarca, Nonrheumatic aortic valve disorder, DEL (acute kidney injury), Encounter for pre-transplant evaluation for chronic liver disease, Alcoholic hepatitis with ascites, Hepatorenal syndrome, ATN (acute tubular necrosis), Severe alcohol dependence, Coagulopathy, Anemia of chronic disease, Thrombocytopenia, Hyponatremia, Hepatic encephalopathy, Sepsis, Metabolic acidosis, Moderate protein malnutrition, Type 2 diabetes mellitus without complication, Acute cystitis without hematuria, Pleural effusion associated with hepatic disorder, Decompensated hepatic cirrhosis, Alcohol use disorder, severe, in early remission, Pre-transplant evaluation for liver transplant, Acute kidney failure with lesion of tubular necrosis, Prolonged Q-T interval on ECG, Acute maculopapular rash, End stage liver disease, S/P liver transplant, Encounter for weaning from ventilator, Overweight (BMI 25.0-29.9), Type 2 diabetes mellitus without complication, without long-term current use of insulin, Adrenal cortical steroids causing adverse effect in therapeutic use, Prophylactic immunotherapy, Chronic kidney disease-mineral and bone disorder, Delirium, Sinus tachycardia, Hyperkalemia, Acute hyperglycemia, and Acute encephalopathy were also pertinent to this visit.    Recommendations:                 General Recommendations:  Dysphagia therapy  Diet recommendations:  Pleasure Feeding(purees ok provided awake.alert, at 90 degree angle, uses small bites, double swallows and 1:1 assistance ), NPO   Aspiration Precautions: Alternate means of nutrition/hydration, Double swallow with each  bite/sip, Puree for pleasure, Small bites/sips and Strict aspiration precautions   General Precautions: Standard, aspiration, respiratory  Communication strategies:  provide increased time to answer and go to room if call light pushed    History:     Past Medical History:   Diagnosis Date    Liver cirrhosis, alcoholic 09/30/2018       Past Surgical History:   Procedure Laterality Date    EGD (ESOPHAGOGASTRODUODENOSCOPY) N/A 11/6/2018    Performed by Duarte Jules MD at [facility] ([address] [place] [address])    ESOPHAGOGASTRODUODENOSCOPY N/A 11/6/2018    Procedure: EGD (ESOPHAGOGASTRODUODENOSCOPY);  Surgeon: Duarte Jules MD;  Location: [address]);  Service: Endoscopy;  Laterality: N/A;    EXPLORATORY LAPAROTOMY AFTER LIVER TRANSPLANTATION N/A 11/16/2018    Procedure: LAPAROTOMY, EXPLORATORY, AFTER LIVER TRANSPLANT;  Surgeon: Amadou Lester Jr., MD;  Location: [facility] OR [address] [place] [address];  Service: Transplant;  Laterality: N/A;    EXPLORATORY LAPAROTOMY AFTER LIVER TRANSPLANTATION N/A 11/18/2018    Procedure: LAPAROTOMY, EXPLORATORY, AFTER LIVER TRANSPLANT, LIKELY  ABDOMINAL CLOSURE;  Surgeon: Amadou Lester Jr., MD;  Location: [facility] OR [address] [place] [address];  Service: Transplant;  Laterality: N/A;    INSERTION OF TUNNELED CENTRAL VENOUS HEMODIALYSIS CATHETER N/A 11/7/2018    Procedure: INSERTION, CATHETER, CENTRAL VENOUS, HEMODIALYSIS, TUNNELED;  Surgeon: Brock Gonzalez MD;  Location: [facility] CATH LAB;  Service: Nephrology;  Laterality: N/A;    INSERTION, CATHETER, CENTRAL VENOUS, HEMODIALYSIS, TUNNELED N/A 11/7/2018    Performed by Brock Gonzalez MD at [facility] CATH LAB    LAPAROTOMY, EXPLORATORY N/A 11/16/2018    Procedure: LAPAROTOMY, EXPLORATORY;  Surgeon: Amadou Lester Jr., MD;  Location: [facility] OR [address] [place] [address];  Service: Transplant;  Laterality: N/A;    LAPAROTOMY, EXPLORATORY N/A 11/16/2018    Performed by Amadou Lester Jr., MD at [facility] OR [address] [place] [address]    LAPAROTOMY, EXPLORATORY, AFTER LIVER TRANSPLANT N/A 11/16/2018    Performed  "by Amadou Lester Jr., MD at [facility] OR [address] [place] [address]    LAPAROTOMY, EXPLORATORY, AFTER LIVER TRANSPLANT, LIKELY  ABDOMINAL CLOSURE N/A 11/18/2018    [facility]    LIVER TRANSPLANT N/A 11/11/2018    Procedure: TRANSPLANT, LIVER;  Surgeon: Shmuel Leary MD;  Location: [facility] OR [address] [place] [address];  Service: Transplant;  Laterality: N/A;    TRANSPLANT, LIVER N/A 11/11/2018    Performed by Shmuel Leary MD at [facility] OR [address] [place] [address]       Social History: Patient lives with Spouse in [place].    Prior Intubation HX:  11/11- 11/12, 11/16-11/18    CTA Chest: 11/21/2018: No evidence of pulmonary embolus to the level of the proximal segmental arteries, noting somewhat limited evaluation distally.    Large volume right and small volume left bilateral pleural effusions with adjacent collapse of the right lower lobe and focal consolidation within the posterior basal segment of the left lower lobe with associated endobronchial material.  Additional consolidation within the lingula.  Findings within the lower lobes are most compatible with atelectasis.  Aspiration or pneumonia are other possibilities.    Bilateral scattered ground-glass opacity, most suggestive of pulmonary edema.    Small to moderate volume ascites with multiple foci of free air in the right upper quadrant, likely related to recent laparotomy.  Status post liver transplant.    This report was flagged in Epic as abnormal.    Prior diet: Regular, thin      Subjective     SLP reviewed Pt with nurse, nurse reported Pt with multiple swallows during MARCIO  Pt presents easily distracted  He explains, "I feel it on the Left, liek I swallow again to get rid of the particles"  Spouse explains, "I just want him to get something in his stomach"  Patient goals: to eat    Pain/Comfort:  · Pain Rating 1: 0/10    Objective:     Oral Musculature Evaluation  · Oral Musculature: WNL  · Dentition: present and adequate  · Mucosal Quality: " adequate  · Mandibular Strength and Mobility: WNL  · Oral Labial Strength and Mobility: WNL  · Lingual Strength and Mobility: WNL  · Volitional Cough: present, weak   · Volitional Swallow: elicited, timely   · Voice Prior to PO Intake: mildly hoarse     Bedside Swallow Eval:   Consistencies Assessed:  · Thin liquids ice chips x2, cup edge sip x1  · Nectar thick liquids cup edge sips x4  · Puree tsp bites x8     Oral Phase:   · WNL    Pharyngeal Phase:   · coughing/choking  · globus sensation  · regurgitation of food/liquids  · throat clearing    Compensatory Strategies  · single bites/sips   · Double swallows between bites/sips    Treatment: Pt with c/o reflux with cup edge sip thin liquids. Pt with delayed, inconsistent throat clearing following cup edge sips nectar-thickened liquids and larger teaspoon bites of puree. Patient noted to be impulsive with bite sizes when feeding self. Use of single, teaspoon bites and double swallows between bites noted to reduced instances of throat clears. Patient with report of globus sensation with larger bite of puree x1. SLP educated Pt and Spouse on swallow anatomy, risk of aspiration s/p extubation, need for ongoing swallow assessment, purees for pleasure and ongoing aspiration precautions. Patient easily distracted and not able to demonstrate understanding of SLP education. Spouse verbalized understanding. Findings reviewed with nurse following session. Whiteboard updated    Assessment:     Femi Enciso is a 53 y.o. male with an SLP diagnosis of Dysphagia.  ST to continue to follow for ongoing swallow assessment to determine safety/feasibility of re-initiation of PO diet.    Goals:   Multidisciplinary Problems     SLP Goals        Problem: SLP Goal    Goal Priority Disciplines Outcome   SLP Goal     SLP Ongoing (interventions implemented as appropriate)   Description:  Speech Language Pathology Goals  Goals expected to be met by 11/30/2018  1. Pt will participate in ongoing  swallow assessment                           Plan:     · Patient to be seen:  4 x/week   · Plan of Care expires:  12/23/18  · Plan of Care reviewed with:  patient, spouse   · SLP Follow-Up:  Yes       Discharge recommendations:  nursing facility, skilled     Time Tracking:     SLP Treatment Date:   11/23/18  Speech Start Time:  1716  Speech Stop Time:  1743     Speech Total Time (min):  27 min    Billable Minutes: Eval Swallow and Oral Function 27     JESSICA Rocha, [facility]-SLP  Speech-Language Pathology  Pager: [phone]      11/23/2018

## 2018-11-24 NOTE — SUBJECTIVE & OBJECTIVE
Interval History: tolerated HD trial yesterday w/o difficulty, 1,500ccs removed, labs ok this morning, remaining anuric    Review of patient's allergies indicates:   Allergen Reactions    Penicillins Nausea And Vomiting and Rash     Full body rash from cefepime as well     Current Facility-Administered Medications   Medication Frequency    0.9%  NaCl infusion PRN    0.9%  NaCl infusion Once    ceFEPIme injection 1 g Q12H    DAPTOmycin (CUBICIN) 980 mg in sodium chloride 0.9% IVPB Q24H    famotidine (PF) injection 20 mg BID    ganciclovir (CYTOVENE) 209 mg in sodium chloride 0.9% 100 mL IVPB Q24H    heparin (porcine) injection 5,000 Units Q8H    hydrocortisone sodium succinate injection 50 mg Q8H    HYDROmorphone injection 0.2 mg Q3H PRN    isavuconazonium sulfate 372 mg in dextrose 5 % 250 mL Q24H    metronidazole IVPB 500 mg Q8H    oxyCODONE immediate release tablet 5 mg Q4H PRN    oxyCODONE immediate release tablet Tab 10 mg Q4H PRN    potassium phosphate 15 mmol in dextrose 5 % 250 mL infusion BID PRN    QUEtiapine tablet 50 mg QHS    ramelteon tablet 8 mg Nightly PRN    tacrolimus capsule 1 mg BID    ursodiol capsule 300 mg BID       Objective:     Vital Signs (Most Recent):  Temp: 98.5 °F (36.9 °C) (11/24/18 1100)  Pulse: 92 (11/24/18 1130)  Resp: (!) 21 (11/24/18 1130)  BP: 94/62 (11/24/18 1130)  SpO2: 100 % (11/24/18 1130)  O2 Device (Oxygen Therapy): room air (11/24/18 0820) Vital Signs (24h Range):  Temp:  [98.1 °F (36.7 °C)-98.9 °F (37.2 °C)] 98.5 °F (36.9 °C)  Pulse:  [] 92  Resp:  [8-38] 21  SpO2:  [95 %-100 %] 100 %  BP: ()/(58-79) 94/62  Arterial Line BP: ()/(28-73) 126/65     Weight: 96.3 kg (212 lb 4.9 oz) (11/24/18 0500)  Body mass index is 30.46 kg/m².  Body surface area is 2.18 meters squared.    I/O last 3 completed shifts:  In: 5451 [P.O.:436; I.V.:4515; Other:500]  Out: 5242 [Urine:10; Drains:1640; Other:3592]    Physical Exam   Constitutional: He is  oriented to person, place, and time. He appears well-developed.   HENT:   Head: Normocephalic and atraumatic.   Eyes: EOM are normal.   Neck: No JVD present.   Cardiovascular: Normal rate and regular rhythm. Exam reveals no friction rub.   Pulmonary/Chest: Effort normal and breath sounds normal.   Abdominal: Soft. He exhibits distension. There is no tenderness. There is no rebound and no guarding.   Musculoskeletal: He exhibits edema.   Neurological: He is alert and oriented to person, place, and time.   Skin: Skin is warm.   Psychiatric: He has a normal mood and affect.

## 2018-11-24 NOTE — ASSESSMENT & PLAN NOTE
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   - s/p OHLTx 11/11/2018; intra-op SLED  - remaining anuric, but clearance numbers look ok, on SLED overnight  -hemodynamically stable, off pressors    -acid/base, lytes and volume status are all stable this morning    Plan:  -no RRT today  -reassess tomorrow, but possibly RRT then vs Monday

## 2018-11-24 NOTE — ASSESSMENT & PLAN NOTE
"53 year old male with history of ESLD 2/2 ETOH cirrhosis who is s/p liver transplant c/b take-back x 3 for bleeding most recently 11/18    Neuro  -acute change in mental status with confusion and agitation on 11/14, controlled with PRN ativan   -monitor neuro exam - alert, responding more appropriately this morning, had another more "severe" bout of confusion at 2 am this am  - CT head 11/21 negative for acute infarct, will plan to re-scan and evaluate tomorrow vs. Saturday if neuro status fails to improve  -Seroquel nightly, remain at 50mg tonight     CV  -continues to be HDS off pressors  -swan tricia catheter removed  -monitor on telemetry     Resp  -Extubated, on room air  -ABGs & CXR prn  -continue to monitor O2 sats   -CXR stable      Heme  -H/H stable overnight   -INR 1.2, continue to monitor   -daily CBCs, transfuse as needed     ID  -monitor fever and WBC, 24 from 48 s/p IR drain placement yesterday   - afebrile   -f/u cultures: enterococcus faecium - sensitive to linezolid and daptomycin   -Abx: Cipro, fluconazole and daptomycin (switched from linezolid 11/21)     MSK  -PT/OT  -Encourage OOBTC    FEN/GI  -replace lytes   -Renal diet - NPO while disoriented, failed bedside swallow, will consult speech to assess  -s/p ex-lap 11/16 requiring take-back for bleeding, left open, now s/p closure 11/18  -CT chest with evidence of hematoma vs. Abscess in close proximity to the liver - s/p IR drain placement 11/22, f/u gram stain and clx      Endocrine  -insulin as needed. Monitor BG       -Tolerated HD    dispo  -continue SICU care for delerium  "

## 2018-11-24 NOTE — PROGRESS NOTES
[facility]  Critical Care - Surgery  Progress Note    Patient Name: Femi Mohan  MRN: [id number]  Admission Date: 10/27/2018  Hospital Length of Stay: 28 days  Code Status: Full Code  Attending Provider: Femi Patel MD  Primary Care Provider: Primary Doctor No   Principal Problem: Acute encephalopathy    Subjective:     Hospital/ICU Course:  11/11: s/p liver transplant  11/12 - extubated, weaned off pressors; pulled out all 3 JOSE A drains  11/13 - CRRT held overnight; 1U Plt  11/14 -2u pRBC  11/15- 1u prbc  11/16- OR x 2 for bleeding, abthera + JOSE A x 1, 7 PRBC, 3 plt, 1 FFP  11/17 - 2 Plt, 1 FFP, 2 RBCs    Interval History/Significant Events:     NAEON. HD trial 11/23, removed 1 L  HDS, AF, VSS.  BUN/Cr 18/1.2     Follow-up For: Procedure(s) (LRB):  LAPAROTOMY, EXPLORATORY, AFTER LIVER TRANSPLANT, LIKELY  ABDOMINAL CLOSURE (N/A)    Post-Operative Day: 6 Days Post-Op    Objective:     Vital Signs (Most Recent):  Temp: 98.1 °F (36.7 °C) (11/24/18 0300)  Pulse: 96 (11/24/18 0600)  Resp: (!) 22 (11/24/18 0600)  BP: 105/61 (11/24/18 0600)  SpO2: 99 % (11/24/18 0600) Vital Signs (24h Range):  Temp:  [98.1 °F (36.7 °C)-98.9 °F (37.2 °C)] 98.1 °F (36.7 °C)  Pulse:  [] 96  Resp:  [8-113] 22  SpO2:  [96 %-100 %] 99 %  BP: ()/(58-76) 105/61  Arterial Line BP: ()/(28-73) 130/73     Weight: 96.3 kg (212 lb 4.9 oz)  Body mass index is 30.46 kg/m².      Intake/Output Summary (Last 24 hours) at 11/24/2018 0655  Last data filed at 11/24/2018 0500  Gross per 24 hour   Intake 1475 ml   Output 2620 ml   Net -1145 ml       Physical Exam       Constitutional: He appears well-developed.   jaundiced   HENT:   Head: Normocephalic and atraumatic.   Eyes: Scleral icterus is present.   Cardiovascular: Normal rate, regular rhythm and intact distal pulses.   Pulmonary/Chest: Effort normal. No respiratory distress.   Sats > 95 on NC   Abdominal: Soft. He exhibits no distension.   Incision closed wityh staples,  C/D/I  JOSE A drains in place with serous output.   Musculoskeletal: He exhibits edema.   Neurological:   AAOx3  Skin: Skin is warm and dry.   Jaundice improving     Lines/Drains/Airways     Central Venous Catheter Line                 Tunneled Central Line Insertion/Assessment - Double Lumen  11/07/18 1350 right internal jugular 16 days          Drain                 Closed/Suction Drain 11/16/18 1127 Right;Anterior RLQ Bulb 19 Fr. 7 days         Closed/Suction Drain 11/22/18 1219 Right Abdomen Bulb 10 Fr. 1 day          Arterial Line                 Arterial Line 11/22/18 1430 Right Radial 1 day          Peripheral Intravenous Line                 Peripheral IV - Single Lumen 11/21/18 1100 Anterior;Left Forearm 2 days         Peripheral IV - Single Lumen 11/21/18 1100 Right Antecubital 2 days                Significant Labs:    CBC/Anemia Profile:  Recent Labs   Lab 11/23/18  0350 11/24/18  0422   WBC 24.51* 27.34*   HGB 8.2* 9.4*   HCT 24.5* 28.3*   PLT 38* 82*   MCV 90 90   RDW 18.5* 18.6*        Chemistries:  Recent Labs   Lab 11/23/18  0350 11/23/18  1416 11/24/18  0422     142  142 141 141  141  141   K 4.6  4.6  4.6 4.8 4.5  4.5  4.5     110  110 111* 110  110  110   CO2 23  23  23 23 23  23  23   BUN 9  9  9 16 18  18  18   CREATININE 0.7  0.7  0.7 1.0 1.2  1.2  1.2   CALCIUM 7.8*  7.8*  7.8* 7.0* 7.5*  7.5*  7.5*   ALBUMIN 2.5*  2.5*  2.5* 2.2* 2.0*  2.0*  2.0*   PROT 3.9*  --  3.7*   BILITOT 12.1*  --  10.3*   ALKPHOS 230*  --  308*   ALT 51*  --  61*   AST 36  --  42*   MG 2.1 2.1 2.0   PHOS 2.2*  2.2*  2.2* 3.8 3.8  3.8  3.8       All pertinent labs within the past 24 hours have been reviewed.    Significant Imaging:  I have reviewed all pertinent imaging results/findings within the past 24 hours.    Assessment/Plan:     S/P liver transplant    Assessment/Plan:   Femi Enciso is a 53 y.o. male now s/p OLT         * S/P liver transplant        53 year old male  with history of ESLD 2/2 ETOH cirrhosis who is s/p liver transplant 11/11/18     Neuro  -off sedation  -pain: dilaudid, oxycodone prn  -CTH negative for acute ischemic process; will consider MRI if pt's mental status does not return to baseline  -continue seroquel qhs     CV  -HDS, off pressors  -monitor on telemetry      Resp  -sats >95 on RA  -ABGs prn  -daily CXR   -duonebs prn     Heme  -H/H 8.2/24.5-->9.4/28.3.  -q4 CBCs, transfuse per Tx     ID  -AF  -WBC increased to 27.3  -cultures  Remain NGTD  -Abx: flagyl, cefepime, daptomycin     MSK  -continue PT/OT     FEN/GI  -replace lytes prn  -s/p ex-lap 11/16 and 11/18      Endocrine  -insulin as needed  -accuchecks     Renal:  -HD trial 11/23  -trend BMP     PPX:  -SQH, PPI     Dispo:  -continue ICU care              Critical care was time spent personally by me on the following activities: development of treatment plan with patient or surrogate and bedside caregivers, discussions with consultants, evaluation of patient's response to treatment, examination of patient, ordering and performing treatments and interventions, ordering and review of laboratory studies, ordering and review of radiographic studies, pulse oximetry, re-evaluation of patient's condition.  This critical care time did not overlap with that of any other provider or involve time for any procedures.     Adolph Pavon MD  Critical Care - Surgery  [facility]

## 2018-11-24 NOTE — PROGRESS NOTES
[facility]  Infectious Disease  Progress Note    Patient Name: Femi Enciso  MRN: [id number]  Admission Date: 10/27/2018  Length of Stay: 28 days  Attending Physician: Femi Patel MD  Primary Care Provider: Primary Doctor No    Isolation Status: Contact  Assessment/Plan:      Delirium    54yo man w/a history of DM2 and alcohol dependence with associated hepatitis who was admitted on 10/27/2018 with acute liver failure (c/b PSE, HRS, hepatic hydrothorax) and ultimately underwent liver transplantation (s/p DBDLT 11/11/2018, CMV D+/R-, steroid induction, on maintenance tacro/MMF/pred; c/b post-operative delirium, VRE bacteruria, and IA hemorrhage requiring re-do ex-lap, RP/retrorenal/retrocolic hematoma evacuation, cauterization of RP bleed, partial right adrenalectomy, and temporary vac abdominal closure on 11/16/2018 and planned benign second look washout on 11/18/2018). Patient's bleeding has sabilized since cauterization but OR cultures have grown VRE.faecium confirming that this hematoma was infected. He decompensated on 11/21 with acute AMS and worsening leukocytosis despite broad appropriate cultures due to indolent IA infection (with perc-drainage of perihepatic fluid collection by IR on 11/22). He is improving on broad coverage.    - would continue daptomycin as VRE isolate is sensitive and this agent will be better tolerated long term (no expected significant myelosuppression as with linezolid); weekly CPK needed and contact precautions ordered  - would continue empiric cefepime/flagyl for coverage of IA ty in bile  - await pending repeat blood cultures -- NGTD   - would continue posaconazole for mold prophylaxis per institutional protocol  - remainder of prophylaxis per protocol  - will f/u pending cultures to help tailor therapy         Anticipated Disposition: pending improvement    Thank you for your consult. I will follow-up with patient. Please contact us if you have any  additional questions.     Rosalee Ireland MD  Transplant ID Attending  [id number]    Rosalee Ireland MD  Infectious Disease  [facility]    Subjective:     Principal Problem:Acute encephalopathy    HPI: No notes on file  Interval History: Mental status improving. Afebrile. Drain output SS now. No new positive cx.    Review of Systems   Constitutional: Negative for activity change, appetite change, chills, fatigue and fever.   HENT: Negative for congestion, ear pain, rhinorrhea and sore throat.    Eyes: Negative for pain, discharge and visual disturbance.   Respiratory: Negative for cough, chest tightness and shortness of breath.    Cardiovascular: Negative for chest pain and palpitations.   Gastrointestinal: Negative for abdominal distention, abdominal pain, blood in stool, constipation, diarrhea, nausea and vomiting.   Genitourinary: Negative for difficulty urinating.   Musculoskeletal: Negative for back pain and neck pain.   Skin: Negative for color change and rash.   Neurological: Negative for dizziness, numbness and headaches.   Hematological: Negative for adenopathy.   Psychiatric/Behavioral: Positive for behavioral problems and confusion.   All other systems reviewed and are negative.    Objective:     Vital Signs (Most Recent):  Temp: 98.5 °F (36.9 °C) (11/24/18 1100)  Pulse: 91 (11/24/18 1230)  Resp: 19 (11/24/18 1230)  BP: 102/68 (11/24/18 1230)  SpO2: 95 % (11/24/18 1230) Vital Signs (24h Range):  Temp:  [98.1 °F (36.7 °C)-98.9 °F (37.2 °C)] 98.5 °F (36.9 °C)  Pulse:  [] 91  Resp:  [10-38] 19  SpO2:  [95 %-100 %] 95 %  BP: ()/(58-79) 102/68  Arterial Line BP: ()/(28-73) 126/65     Weight: 96.3 kg (212 lb 4.9 oz)  Body mass index is 30.46 kg/m².    Estimated Creatinine Clearance: 82.9 mL/min (based on SCr of 1.2 mg/dL).    Physical Exam   Constitutional: He appears well-developed. No distress.   HENT:   Head: Atraumatic.   Mouth/Throat: Oropharynx is clear and moist. No  oropharyngeal exudate.   Eyes: Conjunctivae and EOM are normal. Pupils are equal, round, and reactive to light. Scleral icterus is present.   Neck: Neck supple.   Cardiovascular: Normal rate and regular rhythm. Exam reveals no friction rub.   No murmur heard.  Pulmonary/Chest: Breath sounds normal. No respiratory distress. He has no wheezes. He has no rales. He exhibits no tenderness.   Abdominal: Soft. Bowel sounds are normal. He exhibits distension. There is tenderness. There is no rebound and no guarding.   SS fluid in both drains.   Musculoskeletal: Normal range of motion. He exhibits edema.   Lymphadenopathy:     He has no cervical adenopathy.   Neurological: He is alert.   Skin: No rash noted. No erythema.       Significant Labs:   CBC:   Recent Labs   Lab 11/23/18  0350 11/24/18  0422   WBC 24.51* 27.34*   HGB 8.2* 9.4*   HCT 24.5* 28.3*   PLT 38* 82*     CMP:   Recent Labs   Lab 11/23/18  0350 11/23/18  1416 11/24/18  0422     142  142 141 141  141  141   K 4.6  4.6  4.6 4.8 4.5  4.5  4.5     110  110 111* 110  110  110   CO2 23  23  23 23 23  23  23   *  111*  111* 131* 149*  149*  149*   BUN 9  9  9 16 18  18  18   CREATININE 0.7  0.7  0.7 1.0 1.2  1.2  1.2   CALCIUM 7.8*  7.8*  7.8* 7.0* 7.5*  7.5*  7.5*   PROT 3.9*  --  3.7*   ALBUMIN 2.5*  2.5*  2.5* 2.2* 2.0*  2.0*  2.0*   BILITOT 12.1*  --  10.3*   ALKPHOS 230*  --  308*   AST 36  --  42*   ALT 51*  --  61*   ANIONGAP 9  9  9 7* 8  8  8   EGFRNONAA >60.0  >60.0  >60.0 >60.0 >60.0  >60.0  >60.0       Significant Imaging: I have reviewed all pertinent imaging results/findings within the past 24 hours.

## 2018-11-24 NOTE — ASSESSMENT & PLAN NOTE
Assessment/Plan:   Femi Enciso is a 53 y.o. male now s/p OLT         * S/P liver transplant        53 year old male with history of ESLD 2/2 ETOH cirrhosis who is s/p liver transplant 11/11/18     Neuro  -off sedation  -pain: dilaudid, oxycodone prn  -CTH negative for acute ischemic process; will consider MRI if pt's mental status does not return to baseline  -continue seroquel qhs     CV  -HDS, off pressors  -monitor on telemetry      Resp  -sats >95 on RA  -ABGs prn  -daily CXR   -duonebs prn     Heme  -H/H 8.2/24.5-->9.4/28.3.  -q4 CBCs, transfuse per Tx     ID  -AF  -WBC increased to 27.3  -cultures  Remain NGTD  -Abx: flagyl, cefepime, daptomycin     MSK  -continue PT/OT     FEN/GI  -replace lytes prn  -s/p ex-lap 11/16 and 11/18      Endocrine  -insulin as needed  -accuchecks     Renal:  -HD trial 11/23  -trend BMP     PPX:  -SQH, PPI     Dispo:  -continue ICU care

## 2018-11-24 NOTE — SUBJECTIVE & OBJECTIVE
Interval History: Mental status improving. Afebrile. Drain output SS now. No new positive cx.    Review of Systems   Constitutional: Negative for activity change, appetite change, chills, fatigue and fever.   HENT: Negative for congestion, ear pain, rhinorrhea and sore throat.    Eyes: Negative for pain, discharge and visual disturbance.   Respiratory: Negative for cough, chest tightness and shortness of breath.    Cardiovascular: Negative for chest pain and palpitations.   Gastrointestinal: Negative for abdominal distention, abdominal pain, blood in stool, constipation, diarrhea, nausea and vomiting.   Genitourinary: Negative for difficulty urinating.   Musculoskeletal: Negative for back pain and neck pain.   Skin: Negative for color change and rash.   Neurological: Negative for dizziness, numbness and headaches.   Hematological: Negative for adenopathy.   Psychiatric/Behavioral: Positive for behavioral problems and confusion.   All other systems reviewed and are negative.    Objective:     Vital Signs (Most Recent):  Temp: 98.5 °F (36.9 °C) (11/24/18 1100)  Pulse: 91 (11/24/18 1230)  Resp: 19 (11/24/18 1230)  BP: 102/68 (11/24/18 1230)  SpO2: 95 % (11/24/18 1230) Vital Signs (24h Range):  Temp:  [98.1 °F (36.7 °C)-98.9 °F (37.2 °C)] 98.5 °F (36.9 °C)  Pulse:  [] 91  Resp:  [10-38] 19  SpO2:  [95 %-100 %] 95 %  BP: ()/(58-79) 102/68  Arterial Line BP: ()/(28-73) 126/65     Weight: 96.3 kg (212 lb 4.9 oz)  Body mass index is 30.46 kg/m².    Estimated Creatinine Clearance: 82.9 mL/min (based on SCr of 1.2 mg/dL).    Physical Exam   Constitutional: He appears well-developed. No distress.   HENT:   Head: Atraumatic.   Mouth/Throat: Oropharynx is clear and moist. No oropharyngeal exudate.   Eyes: Conjunctivae and EOM are normal. Pupils are equal, round, and reactive to light. Scleral icterus is present.   Neck: Neck supple.   Cardiovascular: Normal rate and regular rhythm. Exam reveals no friction rub.    No murmur heard.  Pulmonary/Chest: Breath sounds normal. No respiratory distress. He has no wheezes. He has no rales. He exhibits no tenderness.   Abdominal: Soft. Bowel sounds are normal. He exhibits distension. There is tenderness. There is no rebound and no guarding.   SS fluid in both drains.   Musculoskeletal: Normal range of motion. He exhibits edema.   Lymphadenopathy:     He has no cervical adenopathy.   Neurological: He is alert.   Skin: No rash noted. No erythema.       Significant Labs:   CBC:   Recent Labs   Lab 11/23/18  0350 11/24/18  0422   WBC 24.51* 27.34*   HGB 8.2* 9.4*   HCT 24.5* 28.3*   PLT 38* 82*     CMP:   Recent Labs   Lab 11/23/18  0350 11/23/18  1416 11/24/18  0422     142  142 141 141  141  141   K 4.6  4.6  4.6 4.8 4.5  4.5  4.5     110  110 111* 110  110  110   CO2 23  23  23 23 23  23  23   *  111*  111* 131* 149*  149*  149*   BUN 9  9  9 16 18  18  18   CREATININE 0.7  0.7  0.7 1.0 1.2  1.2  1.2   CALCIUM 7.8*  7.8*  7.8* 7.0* 7.5*  7.5*  7.5*   PROT 3.9*  --  3.7*   ALBUMIN 2.5*  2.5*  2.5* 2.2* 2.0*  2.0*  2.0*   BILITOT 12.1*  --  10.3*   ALKPHOS 230*  --  308*   AST 36  --  42*   ALT 51*  --  61*   ANIONGAP 9  9  9 7* 8  8  8   EGFRNONAA >60.0  >60.0  >60.0 >60.0 >60.0  >60.0  >60.0       Significant Imaging: I have reviewed all pertinent imaging results/findings within the past 24 hours.

## 2018-11-24 NOTE — PROGRESS NOTES
[facility]  Nephrology  Progress Note    Patient Name: Femi Enciso  MRN: [id number]  Admission Date: 10/27/2018  Hospital Length of Stay: 28 days  Attending Provider: Femi Patel MD   Primary Care Physician: Primary Doctor No  Principal Problem:Acute encephalopathy    Subjective:     HPI: 54 y/o man with DM2 presents to the ED with family for liver failure (likely due to EtOH abundant history of drinking - diagnosed in Sept 2018 in [place]).  He reports jaundice, generalized weakness, nausea, diarrhea, and decreased appetite since Sept 2018.  He is from [place], [place], and Dr. Sharma (patients physician) recommended bringing him to hospital for evaluation.  Patient denies any fever, chills, vomiting, chest pain, palpitations, SOB, abdominal pain.      Nephrology consulted for evaluation/management Adri.     Interval History: tolerated HD trial yesterday w/o difficulty, 1,500ccs removed, labs ok this morning, remaining anuric    Review of patient's allergies indicates:   Allergen Reactions    Penicillins Nausea And Vomiting and Rash     Full body rash from cefepime as well     Current Facility-Administered Medications   Medication Frequency    0.9%  NaCl infusion PRN    0.9%  NaCl infusion Once    ceFEPIme injection 1 g Q12H    DAPTOmycin (CUBICIN) 980 mg in sodium chloride 0.9% IVPB Q24H    famotidine (PF) injection 20 mg BID    ganciclovir (CYTOVENE) 209 mg in sodium chloride 0.9% 100 mL IVPB Q24H    heparin (porcine) injection 5,000 Units Q8H    hydrocortisone sodium succinate injection 50 mg Q8H    HYDROmorphone injection 0.2 mg Q3H PRN    isavuconazonium sulfate 372 mg in dextrose 5 % 250 mL Q24H    metronidazole IVPB 500 mg Q8H    oxyCODONE immediate release tablet 5 mg Q4H PRN    oxyCODONE immediate release tablet Tab 10 mg Q4H PRN    potassium phosphate 15 mmol in dextrose 5 % 250 mL infusion BID PRN    QUEtiapine tablet 50 mg QHS    ramelteon tablet 8 mg Nightly PRN     tacrolimus capsule 1 mg BID    ursodiol capsule 300 mg BID       Objective:     Vital Signs (Most Recent):  Temp: 98.5 °F (36.9 °C) (11/24/18 1100)  Pulse: 92 (11/24/18 1130)  Resp: (!) 21 (11/24/18 1130)  BP: 94/62 (11/24/18 1130)  SpO2: 100 % (11/24/18 1130)  O2 Device (Oxygen Therapy): room air (11/24/18 0820) Vital Signs (24h Range):  Temp:  [98.1 °F (36.7 °C)-98.9 °F (37.2 °C)] 98.5 °F (36.9 °C)  Pulse:  [] 92  Resp:  [8-38] 21  SpO2:  [95 %-100 %] 100 %  BP: ()/(58-79) 94/62  Arterial Line BP: ()/(28-73) 126/65     Weight: 96.3 kg (212 lb 4.9 oz) (11/24/18 0500)  Body mass index is 30.46 kg/m².  Body surface area is 2.18 meters squared.    I/O last 3 completed shifts:  In: 5451 [P.O.:436; I.V.:4515; Other:500]  Out: 5242 [Urine:10; Drains:1640; Other:3592]    Physical Exam   Constitutional: He is oriented to person, place, and time. He appears well-developed.   HENT:   Head: Normocephalic and atraumatic.   Eyes: EOM are normal.   Neck: No JVD present.   Cardiovascular: Normal rate and regular rhythm. Exam reveals no friction rub.   Pulmonary/Chest: Effort normal and breath sounds normal.   Abdominal: Soft. He exhibits distension. There is no tenderness. There is no rebound and no guarding.   Musculoskeletal: He exhibits edema.   Neurological: He is alert and oriented to person, place, and time.   Skin: Skin is warm.   Psychiatric: He has a normal mood and affect.           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   - s/p OHLTx 11/11/2018; intra-op SLED  - remaining anuric, but clearance numbers look ok, on SLED overnight  -hemodynamically stable, off pressors    -acid/base, lytes and volume status are all stable this  morning    Plan:  -no RRT today  -reassess tomorrow, but possibly RRT then vs Monday           Thank you for your consult. I will follow-up with patient. Please contact us if you have any additional questions.    Petar Hooys MD  Nephrology  [facility]

## 2018-11-24 NOTE — PROGRESS NOTES
Pt between NSR & ST throughout night. HD trial performed; 1L removed.  Pt's SBP in 90s after trial, MD aware; SBP returned to >100 after 1 hour. JOSE A drain 1: 280cc brown serous output. JOSE A 2: 240cc brown serous output. Pt restrained during shift d/t pulling on central line & drains. Pt turned Q2 to prevent skin breakdown.  Plan for possible step down today.  Plan of care reviewed w/pt & spouse; all questions answered.

## 2018-11-24 NOTE — ASSESSMENT & PLAN NOTE
52yo man w/a history of DM2 and alcohol dependence with associated hepatitis who was admitted on 10/27/2018 with acute liver failure (c/b PSE, HRS, hepatic hydrothorax) and ultimately underwent liver transplantation (s/p DBDLT 11/11/2018, CMV D+/R-, steroid induction, on maintenance tacro/MMF/pred; c/b post-operative delirium, VRE bacteruria, and IA hemorrhage requiring re-do ex-lap, RP/retrorenal/retrocolic hematoma evacuation, cauterization of RP bleed, partial right adrenalectomy, and temporary vac abdominal closure on 11/16/2018 and planned benign second look washout on 11/18/2018). Patient's bleeding has sabilized since cauterization but OR cultures have grown VRE.faecium confirming that this hematoma was infected. He decompensated on 11/21 with acute AMS and worsening leukocytosis despite broad appropriate cultures due to indolent IA infection (with perc-drainage of perihepatic fluid collection by IR on 11/22). He is improving on broad coverage.    - would continue daptomycin as VRE isolate is sensitive and this agent will be better tolerated long term (no expected significant myelosuppression as with linezolid); weekly CPK needed and contact precautions ordered  - would continue empiric cefepime/flagyl for coverage of IA ty in bile  - await pending repeat blood cultures -- NGTD   - would continue posaconazole for mold prophylaxis per institutional protocol  - remainder of prophylaxis per protocol  - will f/u pending cultures to help tailor therapy

## 2018-11-25 PROBLEM — E68 SEQUELAE OF HYPERALIMENTATION: Status: RESOLVED | Noted: 2018-11-20 | Resolved: 2018-11-25

## 2018-11-25 LAB
ALBUMIN SERPL BCP-MCNC: 1.7 G/DL
ALBUMIN SERPL BCP-MCNC: 1.7 G/DL
ALBUMIN SERPL BCP-MCNC: 1.8 G/DL
ALP SERPL-CCNC: 365 U/L
ALT SERPL W/O P-5'-P-CCNC: 67 U/L
ANION GAP SERPL CALC-SCNC: 11 MMOL/L
ANION GAP SERPL CALC-SCNC: 12 MMOL/L
ANION GAP SERPL CALC-SCNC: 12 MMOL/L
ANISOCYTOSIS BLD QL SMEAR: SLIGHT
APTT BLDCRRT: 26.2 SEC
AST SERPL-CCNC: 44 U/L
BASO STIPL BLD QL SMEAR: ABNORMAL
BASOPHILS # BLD AUTO: 0.14 K/UL
BASOPHILS NFR BLD: 0.5 %
BILIRUB DIRECT SERPL-MCNC: 6.6 MG/DL
BILIRUB SERPL-MCNC: 8.4 MG/DL
BUN SERPL-MCNC: 29 MG/DL
BUN SERPL-MCNC: 29 MG/DL
BUN SERPL-MCNC: 34 MG/DL
BURR CELLS BLD QL SMEAR: ABNORMAL
CALCIUM SERPL-MCNC: 7.6 MG/DL
CALCIUM SERPL-MCNC: 7.6 MG/DL
CALCIUM SERPL-MCNC: 7.7 MG/DL
CHLORIDE SERPL-SCNC: 108 MMOL/L
CHLORIDE SERPL-SCNC: 108 MMOL/L
CHLORIDE SERPL-SCNC: 109 MMOL/L
CO2 SERPL-SCNC: 20 MMOL/L
CREAT SERPL-MCNC: 1.7 MG/DL
CREAT SERPL-MCNC: 1.7 MG/DL
CREAT SERPL-MCNC: 1.9 MG/DL
DIFFERENTIAL METHOD: ABNORMAL
EOSINOPHIL # BLD AUTO: 0.3 K/UL
EOSINOPHIL NFR BLD: 1 %
ERYTHROCYTE [DISTWIDTH] IN BLOOD BY AUTOMATED COUNT: 18.7 %
EST. GFR  (AFRICAN AMERICAN): 45.5 ML/MIN/1.73 M^2
EST. GFR  (AFRICAN AMERICAN): 52.1 ML/MIN/1.73 M^2
EST. GFR  (AFRICAN AMERICAN): 52.1 ML/MIN/1.73 M^2
EST. GFR  (NON AFRICAN AMERICAN): 39.4 ML/MIN/1.73 M^2
EST. GFR  (NON AFRICAN AMERICAN): 45 ML/MIN/1.73 M^2
EST. GFR  (NON AFRICAN AMERICAN): 45 ML/MIN/1.73 M^2
GGT SERPL-CCNC: 621 U/L
GIANT PLATELETS BLD QL SMEAR: PRESENT
GLUCOSE SERPL-MCNC: 150 MG/DL
GLUCOSE SERPL-MCNC: 150 MG/DL
GLUCOSE SERPL-MCNC: 205 MG/DL
HCT VFR BLD AUTO: 29.5 %
HGB BLD-MCNC: 9.4 G/DL
IMM GRANULOCYTES # BLD AUTO: 1.13 K/UL
IMM GRANULOCYTES NFR BLD AUTO: 4.2 %
INR PPP: 1.3
LYMPHOCYTES # BLD AUTO: 1.4 K/UL
LYMPHOCYTES NFR BLD: 5.4 %
MAGNESIUM SERPL-MCNC: 1.7 MG/DL
MAGNESIUM SERPL-MCNC: 1.7 MG/DL
MAGNESIUM SERPL-MCNC: 2.1 MG/DL
MCH RBC QN AUTO: 29 PG
MCHC RBC AUTO-ENTMCNC: 31.9 G/DL
MCV RBC AUTO: 91 FL
MONOCYTES # BLD AUTO: 1.5 K/UL
MONOCYTES NFR BLD: 5.5 %
NEUTROPHILS # BLD AUTO: 22.3 K/UL
NEUTROPHILS NFR BLD: 83.4 %
NRBC BLD-RTO: 0 /100 WBC
PHOSPHATE SERPL-MCNC: 3.4 MG/DL
PHOSPHATE SERPL-MCNC: 3.4 MG/DL
PHOSPHATE SERPL-MCNC: 4.8 MG/DL
PLATELET # BLD AUTO: 147 K/UL
PLATELET BLD QL SMEAR: ABNORMAL
PMV BLD AUTO: 12.6 FL
POCT GLUCOSE: 166 MG/DL (ref 70–110)
POCT GLUCOSE: 174 MG/DL (ref 70–110)
POCT GLUCOSE: 221 MG/DL (ref 70–110)
POIKILOCYTOSIS BLD QL SMEAR: SLIGHT
POLYCHROMASIA BLD QL SMEAR: ABNORMAL
POTASSIUM SERPL-SCNC: 4.4 MMOL/L
POTASSIUM SERPL-SCNC: 4.4 MMOL/L
POTASSIUM SERPL-SCNC: 5 MMOL/L
PROT SERPL-MCNC: 3.8 G/DL
PROTHROMBIN TIME: 13.2 SEC
RBC # BLD AUTO: 3.24 M/UL
SODIUM SERPL-SCNC: 140 MMOL/L
TACROLIMUS BLD-MCNC: 2.9 NG/ML
WBC # BLD AUTO: 26.7 K/UL

## 2018-11-25 PROCEDURE — 63600175 PHARM REV CODE 636 W HCPCS: Performed by: STUDENT IN AN ORGANIZED HEALTH CARE EDUCATION/TRAINING PROGRAM

## 2018-11-25 PROCEDURE — S0030 INJECTION, METRONIDAZOLE: HCPCS | Performed by: SURGERY

## 2018-11-25 PROCEDURE — 25000003 PHARM REV CODE 250: Performed by: SURGERY

## 2018-11-25 PROCEDURE — 99233 SBSQ HOSP IP/OBS HIGH 50: CPT | Mod: 24,,, | Performed by: SURGERY

## 2018-11-25 PROCEDURE — 20000000 HC ICU ROOM

## 2018-11-25 PROCEDURE — 99233 PR SUBSEQUENT HOSPITAL CARE,LEVL III: ICD-10-PCS | Mod: ,,, | Performed by: INTERNAL MEDICINE

## 2018-11-25 PROCEDURE — 82248 BILIRUBIN DIRECT: CPT

## 2018-11-25 PROCEDURE — 63600175 PHARM REV CODE 636 W HCPCS: Performed by: NURSE PRACTITIONER

## 2018-11-25 PROCEDURE — 85025 COMPLETE CBC W/AUTO DIFF WBC: CPT

## 2018-11-25 PROCEDURE — S0028 INJECTION, FAMOTIDINE, 20 MG: HCPCS | Performed by: SURGERY

## 2018-11-25 PROCEDURE — 25000003 PHARM REV CODE 250: Performed by: INTERNAL MEDICINE

## 2018-11-25 PROCEDURE — 85730 THROMBOPLASTIN TIME PARTIAL: CPT

## 2018-11-25 PROCEDURE — 85610 PROTHROMBIN TIME: CPT

## 2018-11-25 PROCEDURE — 94761 N-INVAS EAR/PLS OXIMETRY MLT: CPT

## 2018-11-25 PROCEDURE — 99231 PR SUBSEQUENT HOSPITAL CARE,LEVL I: ICD-10-PCS | Mod: ,,, | Performed by: NURSE PRACTITIONER

## 2018-11-25 PROCEDURE — 80100014 HC HEMODIALYSIS 1:1

## 2018-11-25 PROCEDURE — 63600175 PHARM REV CODE 636 W HCPCS: Performed by: TRANSPLANT SURGERY

## 2018-11-25 PROCEDURE — 63600175 PHARM REV CODE 636 W HCPCS: Performed by: SURGERY

## 2018-11-25 PROCEDURE — 25000003 PHARM REV CODE 250: Performed by: STUDENT IN AN ORGANIZED HEALTH CARE EDUCATION/TRAINING PROGRAM

## 2018-11-25 PROCEDURE — 99231 SBSQ HOSP IP/OBS SF/LOW 25: CPT | Mod: ,,, | Performed by: NURSE PRACTITIONER

## 2018-11-25 PROCEDURE — 99232 PR SUBSEQUENT HOSPITAL CARE,LEVL II: ICD-10-PCS | Mod: GC,,, | Performed by: INTERNAL MEDICINE

## 2018-11-25 PROCEDURE — 63600175 PHARM REV CODE 636 W HCPCS: Mod: JG | Performed by: INTERNAL MEDICINE

## 2018-11-25 PROCEDURE — 99232 SBSQ HOSP IP/OBS MODERATE 35: CPT | Mod: GC,,, | Performed by: INTERNAL MEDICINE

## 2018-11-25 PROCEDURE — 83735 ASSAY OF MAGNESIUM: CPT | Mod: 91

## 2018-11-25 PROCEDURE — 80069 RENAL FUNCTION PANEL: CPT | Mod: 91

## 2018-11-25 PROCEDURE — 82977 ASSAY OF GGT: CPT

## 2018-11-25 PROCEDURE — 99233 PR SUBSEQUENT HOSPITAL CARE,LEVL III: ICD-10-PCS | Mod: 24,,, | Performed by: SURGERY

## 2018-11-25 PROCEDURE — 63600175 PHARM REV CODE 636 W HCPCS: Mod: JG | Performed by: SURGERY

## 2018-11-25 PROCEDURE — 80069 RENAL FUNCTION PANEL: CPT

## 2018-11-25 PROCEDURE — 83735 ASSAY OF MAGNESIUM: CPT

## 2018-11-25 PROCEDURE — 99233 SBSQ HOSP IP/OBS HIGH 50: CPT | Mod: ,,, | Performed by: INTERNAL MEDICINE

## 2018-11-25 PROCEDURE — 80053 COMPREHEN METABOLIC PANEL: CPT

## 2018-11-25 PROCEDURE — 80197 ASSAY OF TACROLIMUS: CPT

## 2018-11-25 RX ORDER — SODIUM CHLORIDE 9 MG/ML
INJECTION, SOLUTION INTRAVENOUS ONCE
Status: DISCONTINUED | OUTPATIENT
Start: 2018-11-25 | End: 2018-11-26

## 2018-11-25 RX ORDER — SODIUM CHLORIDE 9 MG/ML
INJECTION, SOLUTION INTRAVENOUS
Status: DISCONTINUED | OUTPATIENT
Start: 2018-11-25 | End: 2018-11-26

## 2018-11-25 RX ORDER — GLUCAGON 1 MG
1 KIT INJECTION
Status: DISCONTINUED | OUTPATIENT
Start: 2018-11-25 | End: 2018-11-27

## 2018-11-25 RX ORDER — TACROLIMUS 1 MG/1
2 CAPSULE ORAL 2 TIMES DAILY
Status: DISCONTINUED | OUTPATIENT
Start: 2018-11-25 | End: 2018-11-27

## 2018-11-25 RX ORDER — INSULIN ASPART 100 [IU]/ML
1-10 INJECTION, SOLUTION INTRAVENOUS; SUBCUTANEOUS EVERY 6 HOURS PRN
Status: DISCONTINUED | OUTPATIENT
Start: 2018-11-25 | End: 2018-11-27

## 2018-11-25 RX ORDER — HEPARIN SODIUM 1000 [USP'U]/ML
1000 INJECTION, SOLUTION INTRAVENOUS; SUBCUTANEOUS
Status: DISCONTINUED | OUTPATIENT
Start: 2018-11-25 | End: 2018-12-12

## 2018-11-25 RX ADMIN — HEPARIN SODIUM 5000 UNITS: 5000 INJECTION, SOLUTION INTRAVENOUS; SUBCUTANEOUS at 02:11

## 2018-11-25 RX ADMIN — HYDROCORTISONE SODIUM SUCCINATE 50 MG: 100 INJECTION, POWDER, FOR SOLUTION INTRAMUSCULAR; INTRAVENOUS at 09:11

## 2018-11-25 RX ADMIN — HYDROCORTISONE SODIUM SUCCINATE 50 MG: 100 INJECTION, POWDER, FOR SOLUTION INTRAMUSCULAR; INTRAVENOUS at 02:11

## 2018-11-25 RX ADMIN — URSODIOL 300 MG: 300 CAPSULE ORAL at 08:11

## 2018-11-25 RX ADMIN — ISAVUCONAZONIUM SULFATE 372 MG: 74.4 INJECTION, POWDER, LYOPHILIZED, FOR SOLUTION INTRAVENOUS at 10:11

## 2018-11-25 RX ADMIN — HEPARIN SODIUM 5000 UNITS: 5000 INJECTION, SOLUTION INTRAVENOUS; SUBCUTANEOUS at 05:11

## 2018-11-25 RX ADMIN — CEFEPIME 1 G: 1 INJECTION, POWDER, FOR SOLUTION INTRAMUSCULAR; INTRAVENOUS at 09:11

## 2018-11-25 RX ADMIN — URSODIOL 300 MG: 300 CAPSULE ORAL at 09:11

## 2018-11-25 RX ADMIN — INSULIN ASPART 4 UNITS: 100 INJECTION, SOLUTION INTRAVENOUS; SUBCUTANEOUS at 01:11

## 2018-11-25 RX ADMIN — DAPTOMYCIN 980 MG: 500 INJECTION, POWDER, LYOPHILIZED, FOR SOLUTION INTRAVENOUS at 11:11

## 2018-11-25 RX ADMIN — HYDROCORTISONE SODIUM SUCCINATE 50 MG: 100 INJECTION, POWDER, FOR SOLUTION INTRAMUSCULAR; INTRAVENOUS at 05:11

## 2018-11-25 RX ADMIN — FAMOTIDINE 20 MG: 10 INJECTION, SOLUTION INTRAVENOUS at 09:11

## 2018-11-25 RX ADMIN — INSULIN ASPART 2 UNITS: 100 INJECTION, SOLUTION INTRAVENOUS; SUBCUTANEOUS at 06:11

## 2018-11-25 RX ADMIN — GANCICLOVIR SODIUM 209 MG: 500 INJECTION, POWDER, LYOPHILIZED, FOR SOLUTION INTRAVENOUS at 08:11

## 2018-11-25 RX ADMIN — INSULIN ASPART 1 UNITS: 100 INJECTION, SOLUTION INTRAVENOUS; SUBCUTANEOUS at 09:11

## 2018-11-25 RX ADMIN — QUETIAPINE FUMARATE 50 MG: 25 TABLET, FILM COATED ORAL at 09:11

## 2018-11-25 RX ADMIN — HEPARIN SODIUM 5000 UNITS: 5000 INJECTION, SOLUTION INTRAVENOUS; SUBCUTANEOUS at 09:11

## 2018-11-25 RX ADMIN — TACROLIMUS 1 MG: 1 CAPSULE ORAL at 08:11

## 2018-11-25 RX ADMIN — METRONIDAZOLE 500 MG: 500 INJECTION, SOLUTION INTRAVENOUS at 08:11

## 2018-11-25 RX ADMIN — METRONIDAZOLE 500 MG: 500 INJECTION, SOLUTION INTRAVENOUS at 05:11

## 2018-11-25 RX ADMIN — FAMOTIDINE 20 MG: 10 INJECTION, SOLUTION INTRAVENOUS at 08:11

## 2018-11-25 RX ADMIN — TACROLIMUS 2 MG: 1 CAPSULE ORAL at 05:11

## 2018-11-25 RX ADMIN — METRONIDAZOLE 500 MG: 500 INJECTION, SOLUTION INTRAVENOUS at 02:11

## 2018-11-25 NOTE — SUBJECTIVE & OBJECTIVE
Interval History/Significant Events:     NAEON. Slept well last night. HD planned for today.  HDS, AF, VSS.        Follow-up For: Procedure(s) (LRB):  LAPAROTOMY, EXPLORATORY, AFTER LIVER TRANSPLANT, LIKELY  ABDOMINAL CLOSURE (N/A)        Objective:     Vital Signs (Most Recent):  Temp: 97.7 °F (36.5 °C) (11/25/18 0700)  Pulse: 84 (11/25/18 0730)  Resp: (!) 25 (11/25/18 0730)  BP: 104/77 (11/25/18 0730)  SpO2: 100 % (11/25/18 0730) Vital Signs (24h Range):  Temp:  [97.7 °F (36.5 °C)-98.5 °F (36.9 °C)] 97.7 °F (36.5 °C)  Pulse:  [] 84  Resp:  [14-32] 25  SpO2:  [95 %-100 %] 100 %  BP: ()/(62-77) 104/77     Weight: 96.3 kg (212 lb 4.9 oz)  Body mass index is 30.46 kg/m².      Intake/Output Summary (Last 24 hours) at 11/25/2018 0800  Last data filed at 11/25/2018 0700  Gross per 24 hour   Intake 754 ml   Output 3240 ml   Net -2486 ml       Physical Exam       Constitutional: He appears well-developed.   jaundiced   HENT:   Head: Normocephalic and atraumatic.   Eyes: Scleral icterus is present.   Cardiovascular: Normal rate, regular rhythm and intact distal pulses.   Pulmonary/Chest: Effort normal. No respiratory distress.   Sats > 95 on NC   Abdominal: Soft. He exhibits no distension.   Incision closed wityh staples, C/D/I  JOSE A drains in place with serous output.   Musculoskeletal: He exhibits edema.   Neurological:   AAOx3  Skin: Skin is warm and dry.   Jaundice improving     Lines/Drains/Airways     Central Venous Catheter Line                 Tunneled Central Line Insertion/Assessment - Double Lumen  11/07/18 1350 right internal jugular 17 days          Drain                 Closed/Suction Drain 11/16/18 1127 Right;Anterior RLQ Bulb 19 Fr. 8 days         Closed/Suction Drain 11/22/18 1219 Right Abdomen Bulb 10 Fr. 2 days          Peripheral Intravenous Line                 Peripheral IV - Single Lumen 11/25/18 0648 Anterior;Left Forearm less than 1 day                Significant Labs:    CBC/Anemia  Profile:  Recent Labs   Lab 11/24/18  0422 11/25/18  0300   WBC 27.34* 26.70*   HGB 9.4* 9.4*   HCT 28.3* 29.5*   PLT 82* 147*   MCV 90 91   RDW 18.6* 18.7*        Chemistries:  Recent Labs   Lab 11/23/18  1416 11/24/18  0422 11/25/18  0300    141  141  141 140  140  140  140  140  140  140   K 4.8 4.5  4.5  4.5 5.0  5.0  5.0  5.0  5.0  5.0  5.0   * 110  110  110 109  109  109  109  109  109  109   CO2 23 23  23  23 20*  20*  20*  20*  20*  20*  20*   BUN 16 18 18  18 34*  34*  34*  34*  34*  34*  34*   CREATININE 1.0 1.2  1.2  1.2 1.9*  1.9*  1.9*  1.9*  1.9*  1.9*  1.9*   CALCIUM 7.0* 7.5*  7.5*  7.5* 7.7*  7.7*  7.7*  7.7*  7.7*  7.7*  7.7*   ALBUMIN 2.2* 2.0*  2.0*  2.0* 1.8*  1.8*  1.8*  1.8*  1.8*  1.8*  1.8*   PROT  --  3.7* 3.8*   BILITOT  --  10.3* 8.4*   ALKPHOS  --  308* 365*   ALT  --  61* 67*   AST  --  42* 44*   MG 2.1 2.0 2.1  2.1  2.1  2.1  2.1  2.1   PHOS 3.8 3.8  3.8  3.8 4.8*  4.8*  4.8*  4.8*  4.8*  4.8*  4.8*       All pertinent labs within the past 24 hours have been reviewed.    Significant Imaging:  I have reviewed all pertinent imaging results/findings within the past 24 hours.

## 2018-11-25 NOTE — SUBJECTIVE & OBJECTIVE
Scheduled Meds:   sodium chloride 0.9%   Intravenous Once    ceFEPime (MAXIPIME) IVPB  1 g Intravenous Q12H    DAPTOmycin (CUBICIN)  IV  10 mg/kg Intravenous Q24H    famotidine (PF)  20 mg Intravenous BID    ganciclovir (CYTOVENE) IVPB  2.5 mg/kg (Dosing Weight) Intravenous Q24H    heparin (porcine)  5,000 Units Subcutaneous Q8H    hydrocortisone sodium succinate  50 mg Intravenous Q8H    custom IVPB builder  372 mg Intravenous Q24H    metronidazole  500 mg Intravenous Q8H    QUEtiapine  50 mg Oral QHS    tacrolimus  1 mg Sublingual BID    ursodiol  300 mg Oral BID     Continuous Infusions:  PRN Meds:sodium chloride 0.9%, HYDROmorphone, oxyCODONE, oxyCODONE, potassium phosphate IVPB, ramelteon    Review of Systems  Objective:     Vital Signs (Most Recent):  Temp: 98.5 °F (36.9 °C) (11/25/18 0300)  Pulse: 94 (11/25/18 0410)  Resp: (!) 22 (11/25/18 0410)  BP: 96/63 (11/25/18 0330)  SpO2: 100 % (11/25/18 0410) Vital Signs (24h Range):  Temp:  [98.1 °F (36.7 °C)-98.5 °F (36.9 °C)] 98.5 °F (36.9 °C)  Pulse:  [] 94  Resp:  [14-32] 22  SpO2:  [95 %-100 %] 100 %  BP: ()/(62-79) 96/63  Arterial Line BP: (116-131)/(63-71) 126/65     Weight: 96.3 kg (212 lb 4.9 oz)  Body mass index is 30.46 kg/m².    Intake/Output - Last 3 Shifts       11/23 0700 - 11/24 0659 11/24 0700 - 11/25 0659    I.V. (mL/kg) 975 (10.1) 604 (6.3)    Other 500     Total Intake(mL/kg) 1475 (15.3) 604 (6.3)    Urine (mL/kg/hr) 10 (0)     Drains 1110 2710    Other 1500     Stool 0 0    Total Output 2620 2710    Net -1145 -2106          Stool Occurrence 1 x 2 x          Physical Exam   Constitutional: He appears well-developed.   jaundiced   HENT:   Head: Normocephalic and atraumatic.   Eyes: Scleral icterus is present.   Cardiovascular: Normal rate, regular rhythm and intact distal pulses.   Pulmonary/Chest: Effort normal. No respiratory distress.   Nasal canula    Abdominal: Soft. He exhibits no distension.   Right sided JOSE A drain  with dark serosanguinous output  New IR drain with serosanguinous output  Incision with clean bandage in place     Musculoskeletal: He exhibits edema.   Neurological: He is alert.   More alert this morning   Skin: Skin is warm and dry.   Jaundice improving       Laboratory:  Immunosuppressants         Stop Route Frequency     tacrolimus capsule 1 mg      -- SL 2 times daily        CBC:   Recent Labs   Lab 11/25/18 0300   WBC 26.70*   RBC 3.24*   HGB 9.4*   HCT 29.5*   *   MCV 91   MCH 29.0   MCHC 31.9*     CMP:   Recent Labs   Lab 11/25/18 0300   *  205*  205*  205*  205*  205*  205*   CALCIUM 7.7*  7.7*  7.7*  7.7*  7.7*  7.7*  7.7*   ALBUMIN 1.8*  1.8*  1.8*  1.8*  1.8*  1.8*  1.8*   PROT 3.8*     140  140  140  140  140  140   K 5.0  5.0  5.0  5.0  5.0  5.0  5.0   CO2 20*  20*  20*  20*  20*  20*  20*     109  109  109  109  109  109   BUN 34*  34*  34*  34*  34*  34*  34*   CREATININE 1.9*  1.9*  1.9*  1.9*  1.9*  1.9*  1.9*   ALKPHOS 365*   ALT 67*   AST 44*   BILITOT 8.4*     Coagulation:   Recent Labs   Lab 11/25/18 0300   INR 1.3*   APTT 26.2     Labs within the past 24 hours have been reviewed.    Diagnostic Results:  I have personally reviewed all pertinent imaging studies.

## 2018-11-25 NOTE — ASSESSMENT & PLAN NOTE
Managed per LTS.   avoid hypoglycemia  Optimize BG control for surgical wound healing.     Lab Results   Component Value Date    ALT 64 (H) 11/27/2018    AST 49 (H) 11/27/2018     (H) 11/26/2018    ALKPHOS 343 (H) 11/27/2018    BILITOT 5.4 (H) 11/27/2018

## 2018-11-25 NOTE — ASSESSMENT & PLAN NOTE
Avoid insulin stacking and hypoglycemia.  CRRT per nephrology  Lab Results   Component Value Date    CREATININE 2.6 (H) 11/27/2018

## 2018-11-25 NOTE — ASSESSMENT & PLAN NOTE
BG goal 140-180    Change correction scale from moderate to low dose given kidney function  BG monitoring AC/HS    Discharge plans- TBD

## 2018-11-25 NOTE — PLAN OF CARE
"Problem: Patient Care Overview  Goal: Plan of Care Review  Dx: Liver transplant (11/11)  hx: ESLD, ETOH abuse; Severe depression.    11/12: liver transplant; extubated. Products given in OR and HD in OR. CRRT nocturnal.  11/13: CRRT nocturnal held; lines removed. FFP x1 given. Pulled out 2 JOSE A drains.   11/14: 3 PRBC's given for hgb 6; liver US shows potential hematoma. Trend CBC. Extreme agitation/attempted to "kill self" by holding breath for as long as possible multiple times. IV ativan/haldol given. Nocturnal CRRT restarted  Potential washout  To be determined.    11/16: OR for exlap and washout--1U PRBCs and 1 plts; sent back to OR--3 U PRBCs, 1FFP, 1cryo; abd open and wound vac placed  11/17: CT chest, abd, pelvis, levo off 2 units PRBCs, 2 units platelets, 1 unit FFP   11/18: OR for closure, extubated   11/19: clear liquid diet   11/20: TPN/Lipids d/c'd, renal diet, transfer orders  11/21: Head and chest CT  11/22: Pt to IR for drain placement  11/23: HD trial    Nursing:   -160         Outcome: Ongoing (interventions implemented as appropriate)  No acute events throughout the night. VSS. Pt AAOx4. Slept through the night 8hrs. Remains on room air satting %. See flowsheets for assessments and intake and output. Pt to possibly get floor orders today. Plan of care reviewed with patient and patients mother. Will continue to monitor.       "

## 2018-11-25 NOTE — PROGRESS NOTES
[facility]  Infectious Disease  Progress Note    Patient Name: Femi Enciso  MRN: [id number]  Admission Date: 10/27/2018  Length of Stay: 29 days  Attending Physician: Femi Patel MD  Primary Care Provider: Primary Doctor No    Isolation Status: Contact  Assessment/Plan:      Delirium    54yo man w/a history of DM2 and alcohol dependence with associated hepatitis who was admitted on 10/27/2018 with acute liver failure (c/b PSE, HRS, hepatic hydrothorax) and ultimately underwent liver transplantation (s/p DBDLT 11/11/2018, CMV D+/R-, steroid induction, on maintenance tacro/MMF/pred; c/b post-operative delirium, VRE bacteruria, and IA hemorrhage requiring re-do ex-lap, RP/retrorenal/retrocolic hematoma evacuation, cauterization of RP bleed, partial right adrenalectomy, and temporary vac abdominal closure on 11/16/2018 and planned benign second look washout on 11/18/2018). ID was consulted on 11/18 for VRE.faecium infected IA hematoma (with growth from washout cultures). His AMS, leukocytosis, and bilirubin have improved with expanded coverage of dapto/cefepime/flagyl and additional percutaneous drain insertion on 11/22.     - would continue daptomycin as VRE isolate is sensitive and this agent will be better tolerated long term (no expected significant myelosuppression as with linezolid); weekly CPK needed and contact precautions ordered  - would continue empiric cefepime/flagyl for coverage of IA ty in bile -- plan at least 7 course empirically  - would continue posaconazole for mold prophylaxis per institutional protocol (risk factor: hemorrhage/washout)  - remainder of prophylaxis per protocol  - will leave final antibiotics recs as he continues to improve and transitions to the floor         Anticipated Disposition: pending improvement    Thank you for your consult. I will follow-up with patient. Please contact us if you have any additional questions.     Rosalee Ireland MD  Transplant ID  Attending  [id number]    Rosalee Ireland MD  Infectious Disease  [facility]    Subjective:     Principal Problem:Acute encephalopathy    HPI: No notes on file  Interval History: Mental status improved. Afebrile. No new positive cx. Bili improving.    Review of Systems   Constitutional: Negative for activity change, appetite change, chills, fatigue and fever.   HENT: Negative for congestion, ear pain, rhinorrhea and sore throat.    Eyes: Negative for pain, discharge and visual disturbance.   Respiratory: Negative for cough, chest tightness and shortness of breath.    Cardiovascular: Negative for chest pain and palpitations.   Gastrointestinal: Negative for abdominal distention, abdominal pain, blood in stool, constipation, diarrhea, nausea and vomiting.   Genitourinary: Negative for difficulty urinating.   Musculoskeletal: Negative for back pain and neck pain.   Skin: Negative for color change and rash.   Neurological: Negative for dizziness, numbness and headaches.   Hematological: Negative for adenopathy.   Psychiatric/Behavioral: Positive for behavioral problems and confusion.   All other systems reviewed and are negative.    Objective:     Vital Signs (Most Recent):  Temp: 97.8 °F (36.6 °C) (11/25/18 1245)  Pulse: 96 (11/25/18 1100)  Resp: (!) 23 (11/25/18 1100)  BP: 101/71 (11/25/18 1400)  SpO2: 98 % (11/25/18 1100) Vital Signs (24h Range):  Temp:  [97.7 °F (36.5 °C)-98.5 °F (36.9 °C)] 97.8 °F (36.6 °C)  Pulse:  [] 96  Resp:  [14-27] 23  SpO2:  [95 %-100 %] 98 %  BP: ()/(60-78) 101/71     Weight: 96.3 kg (212 lb 4.9 oz)  Body mass index is 30.46 kg/m².    Estimated Creatinine Clearance: 52.3 mL/min (A) (based on SCr of 1.9 mg/dL (H)).    Physical Exam   Constitutional: He appears well-developed. No distress.   HENT:   Head: Atraumatic.   Mouth/Throat: Oropharynx is clear and moist. No oropharyngeal exudate.   Eyes: Conjunctivae and EOM are normal. Pupils are equal, round, and  reactive to light. Scleral icterus is present.   Neck: Neck supple.   Cardiovascular: Normal rate and regular rhythm. Exam reveals no friction rub.   No murmur heard.  Pulmonary/Chest: Breath sounds normal. No respiratory distress. He has no wheezes. He has no rales. He exhibits no tenderness.   Abdominal: Soft. Bowel sounds are normal. He exhibits distension. There is tenderness. There is no rebound and no guarding.   SS fluid in both drains.   Musculoskeletal: Normal range of motion. He exhibits edema.   Lymphadenopathy:     He has no cervical adenopathy.   Neurological: He is alert.   Skin: No rash noted. No erythema.       Significant Labs:   CBC:   Recent Labs   Lab 11/24/18  0422 11/25/18  0300   WBC 27.34* 26.70*   HGB 9.4* 9.4*   HCT 28.3* 29.5*   PLT 82* 147*     CMP:   Recent Labs   Lab 11/24/18 0422 11/25/18  0300     141  141 140  140  140  140  140  140  140   K 4.5  4.5  4.5 5.0  5.0  5.0  5.0  5.0  5.0  5.0     110  110 109  109  109  109  109  109  109   CO2 23  23  23 20*  20*  20*  20*  20*  20*  20*   *  149*  149* 205*  205*  205*  205*  205*  205*  205*   BUN 18  18  18 34*  34*  34*  34*  34*  34*  34*   CREATININE 1.2  1.2  1.2 1.9*  1.9*  1.9*  1.9*  1.9*  1.9*  1.9*   CALCIUM 7.5*  7.5*  7.5* 7.7*  7.7*  7.7*  7.7*  7.7*  7.7*  7.7*   PROT 3.7* 3.8*   ALBUMIN 2.0*  2.0*  2.0* 1.8*  1.8*  1.8*  1.8*  1.8*  1.8*  1.8*   BILITOT 10.3* 8.4*   ALKPHOS 308* 365*   AST 42* 44*   ALT 61* 67*   ANIONGAP 8  8  8 11  11  11  11  11  11  11   EGFRNONAA >60.0  >60.0  >60.0 39.4*  39.4*  39.4*  39.4*  39.4*  39.4*  39.4*       Significant Imaging: I have reviewed all pertinent imaging results/findings within the past 24 hours.

## 2018-11-25 NOTE — PROGRESS NOTES
[facility]  Critical Care - Surgery  Progress Note    Patient Name: Femi Enciso  MRN: [id number]  Admission Date: 10/27/2018  Hospital Length of Stay: 29 days  Code Status: Full Code  Attending Provider: Femi Patel MD  Primary Care Provider: Primary Doctor No   Principal Problem: Acute encephalopathy    Subjective:     Hospital/ICU Course:  11/11: s/p liver transplant  11/12 - extubated, weaned off pressors; pulled out all 3 JOSE A drains  11/13 - CRRT held overnight; 1U Plt  11/14 -2u pRBC  11/15- 1u prbc  11/16- OR x 2 for bleeding, abthera + JOSE A x 1, 7 PRBC, 3 plt, 1 FFP  11/17 - 2 Plt, 1 FFP, 2 RBCs    Interval History/Significant Events:     NAEON. Slept well last night. HD planned for today.  HDS, AF, VSS.        Follow-up For: Procedure(s) (LRB):  LAPAROTOMY, EXPLORATORY, AFTER LIVER TRANSPLANT, LIKELY  ABDOMINAL CLOSURE (N/A)        Objective:     Vital Signs (Most Recent):  Temp: 97.7 °F (36.5 °C) (11/25/18 0700)  Pulse: 84 (11/25/18 0730)  Resp: (!) 25 (11/25/18 0730)  BP: 104/77 (11/25/18 0730)  SpO2: 100 % (11/25/18 0730) Vital Signs (24h Range):  Temp:  [97.7 °F (36.5 °C)-98.5 °F (36.9 °C)] 97.7 °F (36.5 °C)  Pulse:  [] 84  Resp:  [14-32] 25  SpO2:  [95 %-100 %] 100 %  BP: ()/(62-77) 104/77     Weight: 96.3 kg (212 lb 4.9 oz)  Body mass index is 30.46 kg/m².      Intake/Output Summary (Last 24 hours) at 11/25/2018 0800  Last data filed at 11/25/2018 0700  Gross per 24 hour   Intake 754 ml   Output 3240 ml   Net -2486 ml       Physical Exam       Constitutional: He appears well-developed.   jaundiced   HENT:   Head: Normocephalic and atraumatic.   Eyes: Scleral icterus is present.   Cardiovascular: Normal rate, regular rhythm and intact distal pulses.   Pulmonary/Chest: Effort normal. No respiratory distress.   Sats > 95 on NC   Abdominal: Soft. He exhibits no distension.   Incision closed wityh staples, C/D/I  JOSE A drains in place with serous output.   Musculoskeletal: He  exhibits edema.   Neurological:   AAOx3  Skin: Skin is warm and dry.   Jaundice improving     Lines/Drains/Airways     Central Venous Catheter Line                 Tunneled Central Line Insertion/Assessment - Double Lumen  11/07/18 1350 right internal jugular 17 days          Drain                 Closed/Suction Drain 11/16/18 1127 Right;Anterior RLQ Bulb 19 Fr. 8 days         Closed/Suction Drain 11/22/18 1219 Right Abdomen Bulb 10 Fr. 2 days          Peripheral Intravenous Line                 Peripheral IV - Single Lumen 11/25/18 0648 Anterior;Left Forearm less than 1 day                Significant Labs:    CBC/Anemia Profile:  Recent Labs   Lab 11/24/18  0422 11/25/18  0300   WBC 27.34* 26.70*   HGB 9.4* 9.4*   HCT 28.3* 29.5*   PLT 82* 147*   MCV 90 91   RDW 18.6* 18.7*        Chemistries:  Recent Labs   Lab 11/23/18  1416 11/24/18  0422 11/25/18  0300    141  141  141 140  140  140  140  140  140  140   K 4.8 4.5  4.5  4.5 5.0  5.0  5.0  5.0  5.0  5.0  5.0   * 110  110  110 109  109  109  109  109  109  109   CO2 23 23  23  23 20*  20*  20*  20*  20*  20*  20*   BUN 16 18  18  18 34*  34*  34*  34*  34*  34*  34*   CREATININE 1.0 1.2  1.2  1.2 1.9*  1.9*  1.9*  1.9*  1.9*  1.9*  1.9*   CALCIUM 7.0* 7.5*  7.5*  7.5* 7.7*  7.7*  7.7*  7.7*  7.7*  7.7*  7.7*   ALBUMIN 2.2* 2.0*  2.0*  2.0* 1.8*  1.8*  1.8*  1.8*  1.8*  1.8*  1.8*   PROT  --  3.7* 3.8*   BILITOT  --  10.3* 8.4*   ALKPHOS  --  308* 365*   ALT  --  61* 67*   AST  --  42* 44*   MG 2.1 2.0 2.1  2.1  2.1  2.1  2.1  2.1   PHOS 3.8 3.8  3.8  3.8 4.8*  4.8*  4.8*  4.8*  4.8*  4.8*  4.8*       All pertinent labs within the past 24 hours have been reviewed.    Significant Imaging:  I have reviewed all pertinent imaging results/findings within the past 24 hours.    Assessment/Plan:     S/P liver transplant    Assessment/Plan:   Femi Enciso is a 53 y.o. male now s/p  OLT         * S/P liver transplant        53 year old male with history of ESLD 2/2 ETOH cirrhosis who is s/p liver transplant 11/11/18     Neuro  -off sedation  -pain: dilaudid, oxycodone prn  -CTH negative for acute ischemic process; will consider MRI if pt's mental status does not return to baseline  -continue seroquel qhs     CV  -HDS, off pressors  -monitor on telemetry      Resp  -sats >95 on RA  -ABGs prn  -daily CXR   -duonebs prn     Heme  -H/H stable  -q4 CBCs, transfuse per Tx     ID  -AF  -WBC 27.3-->26.7  -cultures  Remain NGTD  -Abx: flagyl, cefepime, daptomycin     MSK  -continue PT/OT     FEN/GI  -replace lytes prn  -s/p ex-lap 11/16 and 11/18      Endocrine  -insulin as needed  -accuchecks     Renal:  -HD today  -trend BMP     PPX:  -SQH, PPI     Dispo:  -step down today                    Critical care was time spent personally by me on the following activities: development of treatment plan with patient or surrogate and bedside caregivers, discussions with consultants, evaluation of patient's response to treatment, examination of patient, ordering and performing treatments and interventions, ordering and review of laboratory studies, ordering and review of radiographic studies, pulse oximetry, re-evaluation of patient's condition.  This critical care time did not overlap with that of any other provider or involve time for any procedures.     Alfredo Moreno MD  Critical Care - Surgery  [facility]

## 2018-11-25 NOTE — PROGRESS NOTES
Patient with generalized weakness improving. Will sign off please call for nay concerns or questions  Patient need to be maximized medical for stroke prevention with risk factor modifications   No antithrombotics till able with increase in platelets, no statin due to liver transplant

## 2018-11-25 NOTE — PROGRESS NOTES
[facility]  Liver Transplant  Progress Note    Patient Name: Femi Enciso  MRN: [id number]  Admission Date: 10/27/2018  Hospital Length of Stay: 29 days  Code Status: Full Code  Primary Care Provider: Primary Doctor No  Post-Op Day 7 Days Post-Op    Admit Diagnosis: Cirrhosis  Procedures: OLTx    ORGAN:   LIVER  Disease Etiology: Alcoholic Cirrhosis  Donor Type:    - Brain Death  CDC High Risk:   No  Donor CMV Status:   Donor CMV Status: Positive  Donor HBcAB:   Negative  Donor HCV Status:   Negative  Donor HBV JAVIER: Negative  Donor HCV JAVIER: Negative  Whole or Partial: Whole Liver  Biliary Anastomosis: End to End  Arterial Anatomy: Standard    Subjective:     Scheduled Meds:   sodium chloride 0.9%   Intravenous Once    ceFEPime (MAXIPIME) IVPB  1 g Intravenous Q12H    DAPTOmycin (CUBICIN)  IV  10 mg/kg Intravenous Q24H    famotidine (PF)  20 mg Intravenous BID    ganciclovir (CYTOVENE) IVPB  2.5 mg/kg (Dosing Weight) Intravenous Q24H    heparin (porcine)  5,000 Units Subcutaneous Q8H    hydrocortisone sodium succinate  50 mg Intravenous Q8H    custom IVPB builder  372 mg Intravenous Q24H    metronidazole  500 mg Intravenous Q8H    QUEtiapine  50 mg Oral QHS    tacrolimus  1 mg Sublingual BID    ursodiol  300 mg Oral BID     Continuous Infusions:  PRN Meds:sodium chloride 0.9%, HYDROmorphone, oxyCODONE, oxyCODONE, potassium phosphate IVPB, ramelteon    Review of Systems  Objective:     Vital Signs (Most Recent):  Temp: 98.5 °F (36.9 °C) (18 0300)  Pulse: 94 (18 0410)  Resp: (!) 22 (18 0410)  BP: 96/63 (18 0330)  SpO2: 100 % (18 0410) Vital Signs (24h Range):  Temp:  [98.1 °F (36.7 °C)-98.5 °F (36.9 °C)] 98.5 °F (36.9 °C)  Pulse:  [] 94  Resp:  [14-32] 22  SpO2:  [95 %-100 %] 100 %  BP: ()/(62-79) 96/63  Arterial Line BP: (116-131)/(63-71) 126/65     Weight: 96.3 kg (212 lb 4.9 oz)  Body mass index is 30.46 kg/m².    Intake/Output - Last 3  Shifts       11/23 0700 - 11/24 0659 11/24 0700 - 11/25 0659    I.V. (mL/kg) 975 (10.1) 604 (6.3)    Other 500     Total Intake(mL/kg) 1475 (15.3) 604 (6.3)    Urine (mL/kg/hr) 10 (0)     Drains 1110 2710    Other 1500     Stool 0 0    Total Output 2620 2710    Net -1145 -2106          Stool Occurrence 1 x 2 x          Physical Exam   Constitutional: He appears well-developed.   jaundiced   HENT:   Head: Normocephalic and atraumatic.   Eyes: Scleral icterus is present.   Cardiovascular: Normal rate, regular rhythm and intact distal pulses.   Pulmonary/Chest: Effort normal. No respiratory distress.   Nasal canula    Abdominal: Soft. He exhibits no distension.   Right sided JOSE A drain with dark serosanguinous output  New IR drain with serosanguinous output  Incision with clean bandage in place     Musculoskeletal: He exhibits edema.   Neurological: He is alert.   More alert this morning   Skin: Skin is warm and dry.   Jaundice improving       Laboratory:  Immunosuppressants         Stop Route Frequency     tacrolimus capsule 1 mg      -- SL 2 times daily        CBC:   Recent Labs   Lab 11/25/18  0300   WBC 26.70*   RBC 3.24*   HGB 9.4*   HCT 29.5*   *   MCV 91   MCH 29.0   MCHC 31.9*     CMP:   Recent Labs   Lab 11/25/18  0300   *  205*  205*  205*  205*  205*  205*   CALCIUM 7.7*  7.7*  7.7*  7.7*  7.7*  7.7*  7.7*   ALBUMIN 1.8*  1.8*  1.8*  1.8*  1.8*  1.8*  1.8*   PROT 3.8*     140  140  140  140  140  140   K 5.0  5.0  5.0  5.0  5.0  5.0  5.0   CO2 20*  20*  20*  20*  20*  20*  20*     109  109  109  109  109  109   BUN 34*  34*  34*  34*  34*  34*  34*   CREATININE 1.9*  1.9*  1.9*  1.9*  1.9*  1.9*  1.9*   ALKPHOS 365*   ALT 67*   AST 44*   BILITOT 8.4*     Coagulation:   Recent Labs   Lab 11/25/18  0300   INR 1.3*   APTT 26.2     Labs within the past 24 hours have been reviewed.    Diagnostic Results:  I have personally reviewed all  pertinent imaging studies.    Assessment/Plan:   Femi Enciso is a 53 y.o. male     Derm   Acute maculopapular rash    - morbilliform rash likely from drug reaction possibly Cefepime (10/27-10/30).  Per patient rash is very pruritic.  He has been using steroid cream w minimal relief.  He stated significant improvement w receiving Hydroxyzine.  Monitor.         Renal/   Metabolic acidosis    - well managed on sodium bicarb.  Trend daily.        DEL (acute kidney injury)    - DEL over past 2 weeks.  Nephrology was consulted.  Pt not a candidate for kidney transplant before 12-4-18.    - pt tolerating HD w no fluid removed today given hypotension.    - pt w offer for liver transplant this evening.  He will receive intra-op HD.    - cont strict I/O's.         Acute renal failure    - del past 2 weeks.  Pt on Midodrine.  Last HD 11/9/18- 0 fluid removed 2/2 hypotension.  - pt will receive intra op HD.         Hematology   Coagulopathy    - to improve w transplant.  No evidence of acute bleeding, no hematemesis or BRBPR.         Endocrine   Moderate protein malnutrition    - temporal muscle wasting noted.  Decreased appetite and energy over past week worse.   Dietician consulted on admit.  Will consult post transplant as needed.         GI   S/P liver transplant    53 year old male with history of ESLD 2/2 ETOH cirrhosis who is s/p liver transplant c/b take-back x 3 for bleeding most recently 11/18    Neuro  -acute change in mental status with confusion and agitation on 11/14, controlled with PRN ativan   -monitor neuro exam - alert, responding more appropriately this morning, slept well overnight  - CT head 11/21 negative for acute infarct  -Seroquel nightly, remain at 50mg tonight     CV  -continues to be HDS off pressors  -swan tricia catheter removed  -monitor on telemetry     Resp  -Extubated, on room air  -ABGs & CXR prn  -continue to monitor O2 sats   -CXR stable      Heme  -H/H stable overnight   -daily CBCs,  transfuse as needed     ID  -monitor fever and WBC, 26 from 27   - afebrile   -f/u cultures: enterococcus faecium - sensitive to linezolid and daptomycin   -Abx: Cipro, fluconazole and daptomycin (switched from linezolid 11/21)     MSK  -PT/OT  -Encourage OOBTC    FEN/GI  -replace lytes   -Renal diet - NPO while disoriented, failed bedside swallow, will consult speech to assess  -s/p ex-lap 11/16 requiring take-back for bleeding, left open, now s/p closure 11/18  -CT chest with evidence of hematoma vs. Abscess in close proximity to the liver - s/p IR drain placement 11/22, f/u gram stain and clx      Endocrine  -insulin as needed. Monitor BG       -Tolerated HD    dispo  -stepdown today     Jaundice    - t bili 37.5.  Monitor.           The patients clinical status was discussed at multidisplinary rounds, involving transplant surgery, transplant medicine, pharmacy, nursing, nutrition, and social work.    Paul Diaz MD  Liver Transplant  [facility]

## 2018-11-25 NOTE — PROGRESS NOTES
Pt ok to have ice chips sparingly for comfort with RN oversight per Dr. Thomas until Speech can come to see him 11/25/18

## 2018-11-25 NOTE — ASSESSMENT & PLAN NOTE
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   - s/p OHLTx 11/11/2018; intra-op SLED  - remaining anuric, but clearance numbers look ok, on SLED overnight  -hemodynamically stable, off pressors    -acid/base, lytes are ok, patient becoming more edematous, although with drains, still net negative fluid balance    Plan:  -HD at bedside today for volume and metabolic clearance

## 2018-11-25 NOTE — ASSESSMENT & PLAN NOTE
53 year old male with history of ESLD 2/2 ETOH cirrhosis who is s/p liver transplant c/b take-back x 3 for bleeding most recently 11/18    Neuro  -acute change in mental status with confusion and agitation on 11/14, controlled with PRN ativan   -monitor neuro exam - alert, responding more appropriately this morning, slept well overnight  - CT head 11/21 negative for acute infarct  -Seroquel nightly, remain at 50mg tonight     CV  -continues to be HDS off pressors  -swan tricia catheter removed  -monitor on telemetry     Resp  -Extubated, on room air  -ABGs & CXR prn  -continue to monitor O2 sats   -CXR stable      Heme  -H/H stable overnight   -daily CBCs, transfuse as needed     ID  -monitor fever and WBC, 26 from 27   - afebrile   -f/u cultures: enterococcus faecium - sensitive to linezolid and daptomycin   -Abx: Cipro, fluconazole and daptomycin (switched from linezolid 11/21)     MSK  -PT/OT  -Encourage OOBTC    FEN/GI  -replace lytes   -Renal diet - NPO while disoriented, failed bedside swallow, will consult speech to assess  -s/p ex-lap 11/16 requiring take-back for bleeding, left open, now s/p closure 11/18  -CT chest with evidence of hematoma vs. Abscess in close proximity to the liver - s/p IR drain placement 11/22, f/u gram stain and clx      Endocrine  -insulin as needed. Monitor BG       -Tolerated HD    dispo  -stepdown today

## 2018-11-25 NOTE — SUBJECTIVE & OBJECTIVE
"Interval History: feels more "swollen, can no longer see ankles," remaining anuric    Review of patient's allergies indicates:   Allergen Reactions    Penicillins Nausea And Vomiting and Rash     Full body rash from cefepime as well     Current Facility-Administered Medications   Medication Frequency    0.9%  NaCl infusion PRN    0.9%  NaCl infusion Once    0.9%  NaCl infusion PRN    0.9%  NaCl infusion Once    ceFEPIme injection 1 g Q12H    DAPTOmycin (CUBICIN) 980 mg in sodium chloride 0.9% IVPB Q24H    dextrose 50% injection 12.5 g PRN    famotidine (PF) injection 20 mg BID    ganciclovir (CYTOVENE) 209 mg in sodium chloride 0.9% 100 mL IVPB Q24H    glucagon (human recombinant) injection 1 mg PRN    heparin (porcine) injection 5,000 Units Q8H    hydrocortisone sodium succinate injection 50 mg Q8H    HYDROmorphone injection 0.2 mg Q3H PRN    insulin aspart U-100 pen 1-10 Units Q6H PRN    isavuconazonium sulfate 372 mg in dextrose 5 % 250 mL Q24H    metronidazole IVPB 500 mg Q8H    oxyCODONE immediate release tablet 5 mg Q4H PRN    oxyCODONE immediate release tablet Tab 10 mg Q4H PRN    potassium phosphate 15 mmol in dextrose 5 % 250 mL infusion BID PRN    QUEtiapine tablet 50 mg QHS    ramelteon tablet 8 mg Nightly PRN    tacrolimus capsule 1 mg BID    ursodiol capsule 300 mg BID       Objective:     Vital Signs (Most Recent):  Temp: 97.7 °F (36.5 °C) (11/25/18 0700)  Pulse: 96 (11/25/18 0900)  Resp: (!) 21 (11/25/18 0900)  BP: 106/72 (11/25/18 0900)  SpO2: 100 % (11/25/18 0900)  O2 Device (Oxygen Therapy): room air (11/25/18 0857) Vital Signs (24h Range):  Temp:  [97.7 °F (36.5 °C)-98.5 °F (36.9 °C)] 97.7 °F (36.5 °C)  Pulse:  [] 96  Resp:  [14-27] 21  SpO2:  [95 %-100 %] 100 %  BP: ()/(62-78) 106/72     Weight: 96.3 kg (212 lb 4.9 oz) (11/24/18 0500)  Body mass index is 30.46 kg/m².  Body surface area is 2.18 meters squared.    I/O last 3 completed shifts:  In: 1487 [I.V.:987; " Other:500]  Out: 5250 [Drains:3750; Other:1500]    Physical Exam   Constitutional: He is oriented to person, place, and time. He appears well-developed.   HENT:   Head: Normocephalic and atraumatic.   Eyes: EOM are normal.   Neck: No JVD present.   Cardiovascular: Normal rate and regular rhythm. Exam reveals no friction rub.   Pulmonary/Chest: Effort normal and breath sounds normal.   Abdominal: Soft. He exhibits distension. There is no tenderness. There is no rebound and no guarding.   Musculoskeletal: He exhibits edema.   Neurological: He is alert and oriented to person, place, and time.   Skin: Skin is warm.   Psychiatric: He has a normal mood and affect.

## 2018-11-25 NOTE — NURSING
Acute HD 1:1 initiated in SICU with RX as ordered (see flow sheet for details) Lines secured, alarms on.

## 2018-11-25 NOTE — ASSESSMENT & PLAN NOTE
Assessment/Plan:   Femi Enciso is a 53 y.o. male now s/p OLT         * S/P liver transplant        53 year old male with history of ESLD 2/2 ETOH cirrhosis who is s/p liver transplant 11/11/18     Neuro  -off sedation  -pain: dilaudid, oxycodone prn  -CTH negative for acute ischemic process; will consider MRI if pt's mental status does not return to baseline  -continue seroquel qhs     CV  -HDS, off pressors  -monitor on telemetry      Resp  -sats >95 on RA  -ABGs prn  -daily CXR   -duonebs prn     Heme  -H/H stable  -q4 CBCs, transfuse per Tx     ID  -AF  -WBC 27.3-->26.7  -cultures  Remain NGTD  -Abx: flagyl, cefepime, daptomycin     MSK  -continue PT/OT     FEN/GI  -replace lytes prn  -s/p ex-lap 11/16 and 11/18      Endocrine  -insulin as needed  -accuchecks     Renal:  -HD today  -trend BMP     PPX:  -SQH, PPI     Dispo:  -step down today

## 2018-11-25 NOTE — SUBJECTIVE & OBJECTIVE
"Interval HPI:   Overnight events: remains in ICU, awaiting stepdown bed. HD. BG at or slightly above goal overnight. Hydrocortisone 50 mg every 8 hours.   Eating:   NPO  Nausea: No  Hypoglycemia and intervention: No  Fever: No  TPN and/or TF:No       /77   Pulse 84   Temp 97.7 °F (36.5 °C) (Oral)   Resp (!) 25   Ht 5' 10" (1.778 m)   Wt 96.3 kg (212 lb 4.9 oz)   SpO2 100%   BMI 30.46 kg/m²     Labs Reviewed and Include    Recent Labs   Lab 11/25/18  0300   *  205*  205*  205*  205*  205*  205*   CALCIUM 7.7*  7.7*  7.7*  7.7*  7.7*  7.7*  7.7*   ALBUMIN 1.8*  1.8*  1.8*  1.8*  1.8*  1.8*  1.8*   PROT 3.8*     140  140  140  140  140  140   K 5.0  5.0  5.0  5.0  5.0  5.0  5.0   CO2 20*  20*  20*  20*  20*  20*  20*     109  109  109  109  109  109   BUN 34*  34*  34*  34*  34*  34*  34*   CREATININE 1.9*  1.9*  1.9*  1.9*  1.9*  1.9*  1.9*   ALKPHOS 365*   ALT 67*   AST 44*   BILITOT 8.4*     Lab Results   Component Value Date    WBC 26.70 (H) 11/25/2018    HGB 9.4 (L) 11/25/2018    HCT 29.5 (L) 11/25/2018    MCV 91 11/25/2018     (L) 11/25/2018     No results for input(s): TSH, FREET4 in the last 168 hours.  Lab Results   Component Value Date    HGBA1C 4.4 11/09/2018       Nutritional status:   Body mass index is 30.46 kg/m².  Lab Results   Component Value Date    ALBUMIN 1.8 (L) 11/25/2018    ALBUMIN 1.8 (L) 11/25/2018    ALBUMIN 1.8 (L) 11/25/2018    ALBUMIN 1.8 (L) 11/25/2018    ALBUMIN 1.8 (L) 11/25/2018    ALBUMIN 1.8 (L) 11/25/2018    ALBUMIN 1.8 (L) 11/25/2018     Lab Results   Component Value Date    PREALBUMIN 7 (L) 10/27/2018       Estimated Creatinine Clearance: 52.3 mL/min (A) (based on SCr of 1.9 mg/dL (H)).    Accu-Checks  Recent Labs     11/22/18  0901 11/22/18  1324 11/22/18  1708 11/22/18  2113   POCTGLUCOSE 103 145* 134* 122*       Current Medications and/or Treatments Impacting Glycemic " Control  Immunotherapy:    Immunosuppressants         Stop Route Frequency     tacrolimus capsule 1 mg      -- SL 2 times daily        Steroids:   Hormones (From admission, onward)    Start     Stop Route Frequency Ordered    11/22/18 1400  hydrocortisone sodium succinate injection 50 mg      -- IV Every 8 hours 11/22/18 0819    11/09/18 1345  methylPREDNISolone sodium succinate injection 500 mg  (Med - Immunosuppression Induction Therapy (Methylprednisolone))      11/10 0144 IV Once pre-op 11/09/18 1236        Pressors:    Autonomic Drugs (From admission, onward)    None        Hyperglycemia/Diabetes Medications:   Antihyperglycemics (From admission, onward)    Start     Stop Route Frequency Ordered    11/25/18 0901  insulin aspart U-100 pen 1-10 Units      -- SubQ Every 6 hours PRN 11/25/18 0801

## 2018-11-25 NOTE — PROGRESS NOTES
"[facility]  Endocrinology  Progress Note    Admit Date: 10/27/2018     Reason for Consult: Management of Hyperglycemia and type 2 DM    Surgical Procedure and Date: liver transplant 11/11/18; exploratory lap and liver biopsy on 11/16/18      Diabetes diagnosis year: ~ 3-4 years ago     Home Diabetes Medications:  none; previously on metformin 1000 mg BID    How often checking glucose at home? Not checking  BG readings on regimen: n/a  Hypoglycemia on the regimen?  n/a  Missed doses on regimen? n/a    Diabetes Complications include:     DORIAN    Complicating diabetes co morbidities:   CIRRHOSIS and Glucocorticoid use       HPI:   Patient is a 53 y.o. male with a diagnosis of ESLD secondary to ETOH abuse and DEL with ESRD with RRT. Patient is now s/p liver transplant. Endocrinology consulted for BG management.       Lab Results   Component Value Date    HGBA1C 4.4 11/09/2018             Interval HPI:   Overnight events: remains in ICU, awaiting stepdown bed. HD. BG at or slightly above goal overnight. Hydrocortisone 50 mg every 8 hours.   Eating:   NPO  Nausea: No  Hypoglycemia and intervention: No  Fever: No  TPN and/or TF:No       /77   Pulse 84   Temp 97.7 °F (36.5 °C) (Oral)   Resp (!) 25   Ht 5' 10" (1.778 m)   Wt 96.3 kg (212 lb 4.9 oz)   SpO2 100%   BMI 30.46 kg/m²      Labs Reviewed and Include    Recent Labs   Lab 11/25/18  0300   *  205*  205*  205*  205*  205*  205*   CALCIUM 7.7*  7.7*  7.7*  7.7*  7.7*  7.7*  7.7*   ALBUMIN 1.8*  1.8*  1.8*  1.8*  1.8*  1.8*  1.8*   PROT 3.8*     140  140  140  140  140  140   K 5.0  5.0  5.0  5.0  5.0  5.0  5.0   CO2 20*  20*  20*  20*  20*  20*  20*     109  109  109  109  109  109   BUN 34*  34*  34*  34*  34*  34*  34*   CREATININE 1.9*  1.9*  1.9*  1.9*  1.9*  1.9*  1.9*   ALKPHOS 365*   ALT 67*   AST 44*   BILITOT 8.4*     Lab Results   Component Value Date    WBC " 26.70 (H) 11/25/2018    HGB 9.4 (L) 11/25/2018    HCT 29.5 (L) 11/25/2018    MCV 91 11/25/2018     (L) 11/25/2018     No results for input(s): TSH, FREET4 in the last 168 hours.  Lab Results   Component Value Date    HGBA1C 4.4 11/09/2018       Nutritional status:   Body mass index is 30.46 kg/m².  Lab Results   Component Value Date    ALBUMIN 1.8 (L) 11/25/2018    ALBUMIN 1.8 (L) 11/25/2018    ALBUMIN 1.8 (L) 11/25/2018    ALBUMIN 1.8 (L) 11/25/2018    ALBUMIN 1.8 (L) 11/25/2018    ALBUMIN 1.8 (L) 11/25/2018    ALBUMIN 1.8 (L) 11/25/2018     Lab Results   Component Value Date    PREALBUMIN 7 (L) 10/27/2018       Estimated Creatinine Clearance: 52.3 mL/min (A) (based on SCr of 1.9 mg/dL (H)).    Accu-Checks  Recent Labs     11/22/18  0901 11/22/18  1324 11/22/18  1708 11/22/18  2113   POCTGLUCOSE 103 145* 134* 122*       Current Medications and/or Treatments Impacting Glycemic Control  Immunotherapy:    Immunosuppressants         Stop Route Frequency     tacrolimus capsule 1 mg      -- SL 2 times daily        Steroids:   Hormones (From admission, onward)    Start     Stop Route Frequency Ordered    11/22/18 1400  hydrocortisone sodium succinate injection 50 mg      -- IV Every 8 hours 11/22/18 0819    11/09/18 1345  methylPREDNISolone sodium succinate injection 500 mg  (Med - Immunosuppression Induction Therapy (Methylprednisolone))      11/10 0144 IV Once pre-op 11/09/18 1236        Pressors:    Autonomic Drugs (From admission, onward)    None        Hyperglycemia/Diabetes Medications:   Antihyperglycemics (From admission, onward)    Start     Stop Route Frequency Ordered    11/25/18 0901  insulin aspart U-100 pen 1-10 Units      -- SubQ Every 6 hours PRN 11/25/18 0801          ASSESSMENT and PLAN    Type 2 diabetes mellitus without complication    BG goal 140-180      BG monitoring every 6 hours and moderate dose correction scale.       Discharge plans- TBD     S/P liver transplant    Managed per LTS.    avoid hypoglycemia  Optimize BG control for surgical wound healing.     Lab Results   Component Value Date    ALT 51 (H) 11/23/2018    AST 36 11/23/2018     (H) 11/23/2018    ALKPHOS 230 (H) 11/23/2018    BILITOT 12.1 (H) 11/23/2018                   Adrenal cortical steroids causing adverse effect in therapeutic use    On standard steroid taper per transplant team; may elevate BG readings         DEL (acute kidney injury)    Avoid insulin stacking and hypoglycemia.  CRRT per nephrology  Lab Results   Component Value Date    CREATININE 1.0 11/23/2018            Acute renal failure    Avoid insulin stacking and hypoglycemia.         Prophylactic immunotherapy    May increase insulin resistance.            Tessa Falk, NP  Endocrinology  [facility]

## 2018-11-25 NOTE — PROGRESS NOTES
"[facility]  Nephrology  Progress Note    Patient Name: Femi Enciso  MRN: [id number]  Admission Date: 10/27/2018  Hospital Length of Stay: 29 days  Attending Provider: Femi Patel MD   Primary Care Physician: Primary Doctor No  Principal Problem:Acute encephalopathy    Subjective:     HPI: 54 y/o man with DM2 presents to the ED with family for liver failure (likely due to EtOH abundant history of drinking - diagnosed in Sept 2018 in [place]).  He reports jaundice, generalized weakness, nausea, diarrhea, and decreased appetite since Sept 2018.  He is from [place], [place], and Dr. Sharma (patients physician) recommended bringing him to hospital for evaluation.  Patient denies any fever, chills, vomiting, chest pain, palpitations, SOB, abdominal pain.      Nephrology consulted for evaluation/management Adri.     Interval History: feels more "swollen, can no longer see ankles," remaining anuric    Review of patient's allergies indicates:   Allergen Reactions    Penicillins Nausea And Vomiting and Rash     Full body rash from cefepime as well     Current Facility-Administered Medications   Medication Frequency    0.9%  NaCl infusion PRN    0.9%  NaCl infusion Once    0.9%  NaCl infusion PRN    0.9%  NaCl infusion Once    ceFEPIme injection 1 g Q12H    DAPTOmycin (CUBICIN) 980 mg in sodium chloride 0.9% IVPB Q24H    dextrose 50% injection 12.5 g PRN    famotidine (PF) injection 20 mg BID    ganciclovir (CYTOVENE) 209 mg in sodium chloride 0.9% 100 mL IVPB Q24H    glucagon (human recombinant) injection 1 mg PRN    heparin (porcine) injection 5,000 Units Q8H    hydrocortisone sodium succinate injection 50 mg Q8H    HYDROmorphone injection 0.2 mg Q3H PRN    insulin aspart U-100 pen 1-10 Units Q6H PRN    isavuconazonium sulfate 372 mg in dextrose 5 % 250 mL Q24H    metronidazole IVPB 500 mg Q8H    oxyCODONE immediate release tablet 5 mg Q4H PRN    oxyCODONE immediate release tablet Tab 10 mg " Q4H PRN    potassium phosphate 15 mmol in dextrose 5 % 250 mL infusion BID PRN    QUEtiapine tablet 50 mg QHS    ramelteon tablet 8 mg Nightly PRN    tacrolimus capsule 1 mg BID    ursodiol capsule 300 mg BID       Objective:     Vital Signs (Most Recent):  Temp: 97.7 °F (36.5 °C) (11/25/18 0700)  Pulse: 96 (11/25/18 0900)  Resp: (!) 21 (11/25/18 0900)  BP: 106/72 (11/25/18 0900)  SpO2: 100 % (11/25/18 0900)  O2 Device (Oxygen Therapy): room air (11/25/18 0857) Vital Signs (24h Range):  Temp:  [97.7 °F (36.5 °C)-98.5 °F (36.9 °C)] 97.7 °F (36.5 °C)  Pulse:  [] 96  Resp:  [14-27] 21  SpO2:  [95 %-100 %] 100 %  BP: ()/(62-78) 106/72     Weight: 96.3 kg (212 lb 4.9 oz) (11/24/18 0500)  Body mass index is 30.46 kg/m².  Body surface area is 2.18 meters squared.    I/O last 3 completed shifts:  In: 1487 [I.V.:987; Other:500]  Out: 5250 [Drains:3750; Other:1500]    Physical Exam   Constitutional: He is oriented to person, place, and time. He appears well-developed.   HENT:   Head: Normocephalic and atraumatic.   Eyes: EOM are normal.   Neck: No JVD present.   Cardiovascular: Normal rate and regular rhythm. Exam reveals no friction rub.   Pulmonary/Chest: Effort normal and breath sounds normal.   Abdominal: Soft. He exhibits distension. There is no tenderness. There is no rebound and no guarding.   Musculoskeletal: He exhibits edema.   Neurological: He is alert and oriented to person, place, and time.   Skin: Skin is warm.   Psychiatric: He has a normal mood and affect.         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   - s/p OHLTx 11/11/2018; intra-op SLED  - remaining anuric, but clearance numbers look ok, on SLED overnight  -hemodynamically  stable, off pressors    -acid/base, lytes are ok, patient becoming more edematous, although with drains, still net negative fluid balance    Plan:  -HD at bedside today for volume and metabolic clearance             Thank you for your consult. I will follow-up with patient. Please contact us if you have any additional questions.    Petar Hoyos MD  Nephrology  [facility]

## 2018-11-25 NOTE — SUBJECTIVE & OBJECTIVE
Interval History: Mental status improved. Afebrile. No new positive cx. Bili improving.    Review of Systems   Constitutional: Negative for activity change, appetite change, chills, fatigue and fever.   HENT: Negative for congestion, ear pain, rhinorrhea and sore throat.    Eyes: Negative for pain, discharge and visual disturbance.   Respiratory: Negative for cough, chest tightness and shortness of breath.    Cardiovascular: Negative for chest pain and palpitations.   Gastrointestinal: Negative for abdominal distention, abdominal pain, blood in stool, constipation, diarrhea, nausea and vomiting.   Genitourinary: Negative for difficulty urinating.   Musculoskeletal: Negative for back pain and neck pain.   Skin: Negative for color change and rash.   Neurological: Negative for dizziness, numbness and headaches.   Hematological: Negative for adenopathy.   Psychiatric/Behavioral: Positive for behavioral problems and confusion.   All other systems reviewed and are negative.    Objective:     Vital Signs (Most Recent):  Temp: 97.8 °F (36.6 °C) (11/25/18 1245)  Pulse: 96 (11/25/18 1100)  Resp: (!) 23 (11/25/18 1100)  BP: 101/71 (11/25/18 1400)  SpO2: 98 % (11/25/18 1100) Vital Signs (24h Range):  Temp:  [97.7 °F (36.5 °C)-98.5 °F (36.9 °C)] 97.8 °F (36.6 °C)  Pulse:  [] 96  Resp:  [14-27] 23  SpO2:  [95 %-100 %] 98 %  BP: ()/(60-78) 101/71     Weight: 96.3 kg (212 lb 4.9 oz)  Body mass index is 30.46 kg/m².    Estimated Creatinine Clearance: 52.3 mL/min (A) (based on SCr of 1.9 mg/dL (H)).    Physical Exam   Constitutional: He appears well-developed. No distress.   HENT:   Head: Atraumatic.   Mouth/Throat: Oropharynx is clear and moist. No oropharyngeal exudate.   Eyes: Conjunctivae and EOM are normal. Pupils are equal, round, and reactive to light. Scleral icterus is present.   Neck: Neck supple.   Cardiovascular: Normal rate and regular rhythm. Exam reveals no friction rub.   No murmur heard.  Pulmonary/Chest:  Breath sounds normal. No respiratory distress. He has no wheezes. He has no rales. He exhibits no tenderness.   Abdominal: Soft. Bowel sounds are normal. He exhibits distension. There is tenderness. There is no rebound and no guarding.   SS fluid in both drains.   Musculoskeletal: Normal range of motion. He exhibits edema.   Lymphadenopathy:     He has no cervical adenopathy.   Neurological: He is alert.   Skin: No rash noted. No erythema.       Significant Labs:   CBC:   Recent Labs   Lab 11/24/18 0422 11/25/18  0300   WBC 27.34* 26.70*   HGB 9.4* 9.4*   HCT 28.3* 29.5*   PLT 82* 147*     CMP:   Recent Labs   Lab 11/24/18 0422 11/25/18  0300     141  141 140  140  140  140  140  140  140   K 4.5  4.5  4.5 5.0  5.0  5.0  5.0  5.0  5.0  5.0     110  110 109  109  109  109  109  109  109   CO2 23  23  23 20*  20*  20*  20*  20*  20*  20*   *  149*  149* 205*  205*  205*  205*  205*  205*  205*   BUN 18  18  18 34*  34*  34*  34*  34*  34*  34*   CREATININE 1.2  1.2  1.2 1.9*  1.9*  1.9*  1.9*  1.9*  1.9*  1.9*   CALCIUM 7.5*  7.5*  7.5* 7.7*  7.7*  7.7*  7.7*  7.7*  7.7*  7.7*   PROT 3.7* 3.8*   ALBUMIN 2.0*  2.0*  2.0* 1.8*  1.8*  1.8*  1.8*  1.8*  1.8*  1.8*   BILITOT 10.3* 8.4*   ALKPHOS 308* 365*   AST 42* 44*   ALT 61* 67*   ANIONGAP 8  8  8 11  11  11  11  11  11  11   EGFRNONAA >60.0  >60.0  >60.0 39.4*  39.4*  39.4*  39.4*  39.4*  39.4*  39.4*       Significant Imaging: I have reviewed all pertinent imaging results/findings within the past 24 hours.

## 2018-11-25 NOTE — ASSESSMENT & PLAN NOTE
54yo man w/a history of DM2 and alcohol dependence with associated hepatitis who was admitted on 10/27/2018 with acute liver failure (c/b PSE, HRS, hepatic hydrothorax) and ultimately underwent liver transplantation (s/p DBDLT 11/11/2018, CMV D+/R-, steroid induction, on maintenance tacro/MMF/pred; c/b post-operative delirium, VRE bacteruria, and IA hemorrhage requiring re-do ex-lap, RP/retrorenal/retrocolic hematoma evacuation, cauterization of RP bleed, partial right adrenalectomy, and temporary vac abdominal closure on 11/16/2018 and planned benign second look washout on 11/18/2018). ID was consulted on 11/18 for VRE.faecium infected IA hematoma (with growth from washout cultures). His AMS, leukocytosis, and bilirubin have improved with expanded coverage of dapto/cefepime/flagyl and additional percutaneous drain insertion on 11/22.     - would continue daptomycin as VRE isolate is sensitive and this agent will be better tolerated long term (no expected significant myelosuppression as with linezolid); weekly CPK needed and contact precautions ordered  - would continue empiric cefepime/flagyl for coverage of IA ty in bile -- plan at least 7 course empirically  - would continue posaconazole for mold prophylaxis per institutional protocol (risk factor: hemorrhage/washout)  - remainder of prophylaxis per protocol  - will leave final antibiotics recs as he continues to improve and transitions to the floor

## 2018-11-25 NOTE — NURSING
HD for 3 hours completed. Pt tolerated well. 1550 ml fluid removed. Post HD lines flushed with saline and capped securely.

## 2018-11-26 PROBLEM — K74.60 DECOMPENSATED HEPATIC CIRRHOSIS: Status: RESOLVED | Noted: 2018-10-28 | Resolved: 2018-11-26

## 2018-11-26 PROBLEM — J91.8 PLEURAL EFFUSION ASSOCIATED WITH HEPATIC DISORDER: Status: RESOLVED | Noted: 2018-10-27 | Resolved: 2018-11-26

## 2018-11-26 PROBLEM — R21 ACUTE MACULOPAPULAR RASH: Status: RESOLVED | Noted: 2018-11-06 | Resolved: 2018-11-26

## 2018-11-26 PROBLEM — K72.00 ACUTE LIVER FAILURE: Status: RESOLVED | Noted: 2018-10-27 | Resolved: 2018-11-26

## 2018-11-26 PROBLEM — D69.6 THROMBOCYTOPENIA: Status: RESOLVED | Noted: 2018-10-27 | Resolved: 2018-11-26

## 2018-11-26 PROBLEM — F10.21 ALCOHOL USE DISORDER, SEVERE, IN EARLY REMISSION: Status: RESOLVED | Noted: 2018-10-30 | Resolved: 2018-11-26

## 2018-11-26 PROBLEM — E87.1 HYPONATREMIA: Status: RESOLVED | Noted: 2018-10-27 | Resolved: 2018-11-26

## 2018-11-26 PROBLEM — F10.20 SEVERE ALCOHOL DEPENDENCE: Status: RESOLVED | Noted: 2018-10-27 | Resolved: 2018-11-26

## 2018-11-26 PROBLEM — N17.9 AKI (ACUTE KIDNEY INJURY): Chronic | Status: RESOLVED | Noted: 2018-10-27 | Resolved: 2018-11-26

## 2018-11-26 PROBLEM — N17.9 ACUTE RENAL FAILURE: Status: RESOLVED | Noted: 2018-10-27 | Resolved: 2018-11-26

## 2018-11-26 PROBLEM — E87.20 METABOLIC ACIDOSIS: Status: RESOLVED | Noted: 2018-10-27 | Resolved: 2018-11-26

## 2018-11-26 PROBLEM — D68.9 COAGULOPATHY: Status: RESOLVED | Noted: 2018-10-27 | Resolved: 2018-11-26

## 2018-11-26 PROBLEM — K76.9 PLEURAL EFFUSION ASSOCIATED WITH HEPATIC DISORDER: Status: RESOLVED | Noted: 2018-10-27 | Resolved: 2018-11-26

## 2018-11-26 PROBLEM — R17 JAUNDICE: Status: RESOLVED | Noted: 2018-10-27 | Resolved: 2018-11-26

## 2018-11-26 PROBLEM — E44.0 MODERATE PROTEIN MALNUTRITION: Status: RESOLVED | Noted: 2018-10-27 | Resolved: 2018-11-26

## 2018-11-26 PROBLEM — N17.0 ACUTE KIDNEY FAILURE WITH LESION OF TUBULAR NECROSIS: Status: RESOLVED | Noted: 2018-10-27 | Resolved: 2018-11-26

## 2018-11-26 PROBLEM — K72.90 DECOMPENSATED HEPATIC CIRRHOSIS: Status: RESOLVED | Noted: 2018-10-28 | Resolved: 2018-11-26

## 2018-11-26 LAB
ALBUMIN SERPL BCP-MCNC: 1.6 G/DL
ALBUMIN SERPL BCP-MCNC: 1.6 G/DL
ALP SERPL-CCNC: 353 U/L
ALT SERPL W/O P-5'-P-CCNC: 74 U/L
ANION GAP SERPL CALC-SCNC: 11 MMOL/L
ANION GAP SERPL CALC-SCNC: 11 MMOL/L
APTT BLDCRRT: 28.2 SEC
AST SERPL-CCNC: 58 U/L
BACTERIA BLD CULT: NORMAL
BACTERIA BLD CULT: NORMAL
BACTERIA SPEC ANAEROBE CULT: NORMAL
BASOPHILS # BLD AUTO: 0.2 K/UL
BASOPHILS NFR BLD: 0.8 %
BILIRUB DIRECT SERPL-MCNC: 5.3 MG/DL
BILIRUB SERPL-MCNC: 6.4 MG/DL
BUN SERPL-MCNC: 33 MG/DL
BUN SERPL-MCNC: 33 MG/DL
CALCIUM SERPL-MCNC: 7.4 MG/DL
CALCIUM SERPL-MCNC: 7.4 MG/DL
CHLORIDE SERPL-SCNC: 110 MMOL/L
CHLORIDE SERPL-SCNC: 110 MMOL/L
CO2 SERPL-SCNC: 20 MMOL/L
CO2 SERPL-SCNC: 20 MMOL/L
CREAT SERPL-MCNC: 1.7 MG/DL
CREAT SERPL-MCNC: 1.7 MG/DL
DIFFERENTIAL METHOD: ABNORMAL
EOSINOPHIL # BLD AUTO: 0.2 K/UL
EOSINOPHIL NFR BLD: 0.9 %
ERYTHROCYTE [DISTWIDTH] IN BLOOD BY AUTOMATED COUNT: 18.4 %
EST. GFR  (AFRICAN AMERICAN): 52.1 ML/MIN/1.73 M^2
EST. GFR  (AFRICAN AMERICAN): 52.1 ML/MIN/1.73 M^2
EST. GFR  (NON AFRICAN AMERICAN): 45 ML/MIN/1.73 M^2
EST. GFR  (NON AFRICAN AMERICAN): 45 ML/MIN/1.73 M^2
GGT SERPL-CCNC: 600 U/L
GLUCOSE SERPL-MCNC: 163 MG/DL
GLUCOSE SERPL-MCNC: 163 MG/DL
HCT VFR BLD AUTO: 28 %
HGB BLD-MCNC: 9.1 G/DL
IMM GRANULOCYTES # BLD AUTO: 1.11 K/UL
IMM GRANULOCYTES NFR BLD AUTO: 4.4 %
INR PPP: 1.2
LYMPHOCYTES # BLD AUTO: 1.3 K/UL
LYMPHOCYTES NFR BLD: 5 %
MAGNESIUM SERPL-MCNC: 1.7 MG/DL
MCH RBC QN AUTO: 30.1 PG
MCHC RBC AUTO-ENTMCNC: 32.5 G/DL
MCV RBC AUTO: 93 FL
MONOCYTES # BLD AUTO: 1 K/UL
MONOCYTES NFR BLD: 3.8 %
NEUTROPHILS # BLD AUTO: 21.6 K/UL
NEUTROPHILS NFR BLD: 85.1 %
NRBC BLD-RTO: 0 /100 WBC
PHOSPHATE SERPL-MCNC: 3.9 MG/DL
PHOSPHATE SERPL-MCNC: 3.9 MG/DL
PLATELET # BLD AUTO: 167 K/UL
PMV BLD AUTO: 13.5 FL
POCT GLUCOSE: 155 MG/DL (ref 70–110)
POCT GLUCOSE: 159 MG/DL (ref 70–110)
POCT GLUCOSE: 160 MG/DL (ref 70–110)
POCT GLUCOSE: 178 MG/DL (ref 70–110)
POTASSIUM SERPL-SCNC: 4.2 MMOL/L
POTASSIUM SERPL-SCNC: 4.2 MMOL/L
PROT SERPL-MCNC: 3.8 G/DL
PROTHROMBIN TIME: 12.6 SEC
RBC # BLD AUTO: 3.02 M/UL
SODIUM SERPL-SCNC: 141 MMOL/L
SODIUM SERPL-SCNC: 141 MMOL/L
TACROLIMUS BLD-MCNC: 5.1 NG/ML
WBC # BLD AUTO: 25.37 K/UL

## 2018-11-26 PROCEDURE — S0028 INJECTION, FAMOTIDINE, 20 MG: HCPCS | Performed by: SURGERY

## 2018-11-26 PROCEDURE — 85610 PROTHROMBIN TIME: CPT

## 2018-11-26 PROCEDURE — 63600175 PHARM REV CODE 636 W HCPCS: Performed by: NURSE PRACTITIONER

## 2018-11-26 PROCEDURE — 97535 SELF CARE MNGMENT TRAINING: CPT

## 2018-11-26 PROCEDURE — P9047 ALBUMIN (HUMAN), 25%, 50ML: HCPCS | Mod: JG | Performed by: STUDENT IN AN ORGANIZED HEALTH CARE EDUCATION/TRAINING PROGRAM

## 2018-11-26 PROCEDURE — 20600001 HC STEP DOWN PRIVATE ROOM

## 2018-11-26 PROCEDURE — 63600175 PHARM REV CODE 636 W HCPCS: Performed by: SURGERY

## 2018-11-26 PROCEDURE — 99233 SBSQ HOSP IP/OBS HIGH 50: CPT | Mod: ,,, | Performed by: INTERNAL MEDICINE

## 2018-11-26 PROCEDURE — 94761 N-INVAS EAR/PLS OXIMETRY MLT: CPT

## 2018-11-26 PROCEDURE — 25000003 PHARM REV CODE 250: Performed by: SURGERY

## 2018-11-26 PROCEDURE — 25000003 PHARM REV CODE 250: Performed by: INTERNAL MEDICINE

## 2018-11-26 PROCEDURE — 63600175 PHARM REV CODE 636 W HCPCS: Mod: JG | Performed by: SURGERY

## 2018-11-26 PROCEDURE — 92526 ORAL FUNCTION THERAPY: CPT

## 2018-11-26 PROCEDURE — 80053 COMPREHEN METABOLIC PANEL: CPT

## 2018-11-26 PROCEDURE — 80197 ASSAY OF TACROLIMUS: CPT

## 2018-11-26 PROCEDURE — 83735 ASSAY OF MAGNESIUM: CPT

## 2018-11-26 PROCEDURE — 25000003 PHARM REV CODE 250: Performed by: STUDENT IN AN ORGANIZED HEALTH CARE EDUCATION/TRAINING PROGRAM

## 2018-11-26 PROCEDURE — 82248 BILIRUBIN DIRECT: CPT

## 2018-11-26 PROCEDURE — 97530 THERAPEUTIC ACTIVITIES: CPT

## 2018-11-26 PROCEDURE — 63600175 PHARM REV CODE 636 W HCPCS: Performed by: STUDENT IN AN ORGANIZED HEALTH CARE EDUCATION/TRAINING PROGRAM

## 2018-11-26 PROCEDURE — S0030 INJECTION, METRONIDAZOLE: HCPCS | Performed by: SURGERY

## 2018-11-26 PROCEDURE — 25000242 PHARM REV CODE 250 ALT 637 W/ HCPCS: Performed by: STUDENT IN AN ORGANIZED HEALTH CARE EDUCATION/TRAINING PROGRAM

## 2018-11-26 PROCEDURE — 63600175 PHARM REV CODE 636 W HCPCS: Mod: JG | Performed by: INTERNAL MEDICINE

## 2018-11-26 PROCEDURE — 85025 COMPLETE CBC W/AUTO DIFF WBC: CPT

## 2018-11-26 PROCEDURE — 85730 THROMBOPLASTIN TIME PARTIAL: CPT

## 2018-11-26 PROCEDURE — 99900035 HC TECH TIME PER 15 MIN (STAT)

## 2018-11-26 PROCEDURE — 84100 ASSAY OF PHOSPHORUS: CPT

## 2018-11-26 PROCEDURE — 97110 THERAPEUTIC EXERCISES: CPT

## 2018-11-26 PROCEDURE — 63600175 PHARM REV CODE 636 W HCPCS: Performed by: TRANSPLANT SURGERY

## 2018-11-26 PROCEDURE — 99233 PR SUBSEQUENT HOSPITAL CARE,LEVL III: ICD-10-PCS | Mod: ,,, | Performed by: INTERNAL MEDICINE

## 2018-11-26 PROCEDURE — 82977 ASSAY OF GGT: CPT

## 2018-11-26 RX ORDER — PREDNISONE 10 MG/1
10 TABLET ORAL DAILY
Status: COMPLETED | OUTPATIENT
Start: 2018-12-03 | End: 2018-12-09

## 2018-11-26 RX ORDER — VALGANCICLOVIR HYDROCHLORIDE 50 MG/ML
100 POWDER, FOR SOLUTION ORAL
Status: DISCONTINUED | OUTPATIENT
Start: 2018-11-28 | End: 2018-12-31

## 2018-11-26 RX ORDER — PREDNISONE 2.5 MG/1
2.5 TABLET ORAL DAILY
Status: COMPLETED | OUTPATIENT
Start: 2018-12-17 | End: 2018-12-23

## 2018-11-26 RX ORDER — SULFAMETHOXAZOLE AND TRIMETHOPRIM 400; 80 MG/1; MG/1
1 TABLET ORAL
Status: DISCONTINUED | OUTPATIENT
Start: 2018-11-26 | End: 2018-12-02

## 2018-11-26 RX ORDER — FAMOTIDINE 20 MG/1
20 TABLET, FILM COATED ORAL NIGHTLY
Status: DISCONTINUED | OUTPATIENT
Start: 2018-11-26 | End: 2018-12-09

## 2018-11-26 RX ORDER — IPRATROPIUM BROMIDE AND ALBUTEROL SULFATE 2.5; .5 MG/3ML; MG/3ML
3 SOLUTION RESPIRATORY (INHALATION) EVERY 4 HOURS PRN
Status: DISCONTINUED | OUTPATIENT
Start: 2018-11-26 | End: 2019-01-08 | Stop reason: HOSPADM

## 2018-11-26 RX ORDER — PREDNISONE 5 MG/1
5 TABLET ORAL DAILY
Status: COMPLETED | OUTPATIENT
Start: 2018-12-10 | End: 2018-12-16

## 2018-11-26 RX ORDER — METRONIDAZOLE 500 MG/1
500 TABLET ORAL EVERY 8 HOURS
Status: DISCONTINUED | OUTPATIENT
Start: 2018-11-26 | End: 2018-11-29

## 2018-11-26 RX ORDER — ALBUMIN HUMAN 250 G/1000ML
12.5 SOLUTION INTRAVENOUS ONCE
Status: COMPLETED | OUTPATIENT
Start: 2018-11-26 | End: 2018-11-26

## 2018-11-26 RX ADMIN — GANCICLOVIR SODIUM 209 MG: 500 INJECTION, POWDER, LYOPHILIZED, FOR SOLUTION INTRAVENOUS at 08:11

## 2018-11-26 RX ADMIN — CEFEPIME 1 G: 1 INJECTION, POWDER, FOR SOLUTION INTRAMUSCULAR; INTRAVENOUS at 09:11

## 2018-11-26 RX ADMIN — ALBUMIN HUMAN 12.5 G: 0.25 SOLUTION INTRAVENOUS at 12:11

## 2018-11-26 RX ADMIN — PREDNISONE 15 MG: 5 TABLET ORAL at 12:11

## 2018-11-26 RX ADMIN — INSULIN ASPART 2 UNITS: 100 INJECTION, SOLUTION INTRAVENOUS; SUBCUTANEOUS at 06:11

## 2018-11-26 RX ADMIN — DAPTOMYCIN 980 MG: 500 INJECTION, POWDER, LYOPHILIZED, FOR SOLUTION INTRAVENOUS at 11:11

## 2018-11-26 RX ADMIN — SULFAMETHOXAZOLE AND TRIMETHOPRIM 1 TABLET: 400; 80 TABLET ORAL at 12:11

## 2018-11-26 RX ADMIN — TACROLIMUS 2 MG: 1 CAPSULE ORAL at 06:11

## 2018-11-26 RX ADMIN — FAMOTIDINE 20 MG: 20 TABLET ORAL at 09:11

## 2018-11-26 RX ADMIN — URSODIOL 300 MG: 300 CAPSULE ORAL at 08:11

## 2018-11-26 RX ADMIN — HEPARIN SODIUM 5000 UNITS: 5000 INJECTION, SOLUTION INTRAVENOUS; SUBCUTANEOUS at 09:11

## 2018-11-26 RX ADMIN — METRONIDAZOLE 500 MG: 500 TABLET ORAL at 09:11

## 2018-11-26 RX ADMIN — ISAVUCONAZONIUM SULFATE 372 MG: 74.4 INJECTION, POWDER, LYOPHILIZED, FOR SOLUTION INTRAVENOUS at 10:11

## 2018-11-26 RX ADMIN — QUETIAPINE FUMARATE 50 MG: 25 TABLET, FILM COATED ORAL at 09:11

## 2018-11-26 RX ADMIN — HYDROCORTISONE SODIUM SUCCINATE 50 MG: 100 INJECTION, POWDER, FOR SOLUTION INTRAMUSCULAR; INTRAVENOUS at 05:11

## 2018-11-26 RX ADMIN — METRONIDAZOLE 500 MG: 500 INJECTION, SOLUTION INTRAVENOUS at 01:11

## 2018-11-26 RX ADMIN — HEPARIN SODIUM 5000 UNITS: 5000 INJECTION, SOLUTION INTRAVENOUS; SUBCUTANEOUS at 05:11

## 2018-11-26 RX ADMIN — IPRATROPIUM BROMIDE AND ALBUTEROL SULFATE 3 ML: .5; 3 SOLUTION RESPIRATORY (INHALATION) at 02:11

## 2018-11-26 RX ADMIN — FAMOTIDINE 20 MG: 10 INJECTION, SOLUTION INTRAVENOUS at 08:11

## 2018-11-26 RX ADMIN — TACROLIMUS 2 MG: 1 CAPSULE ORAL at 08:11

## 2018-11-26 RX ADMIN — METRONIDAZOLE 500 MG: 500 INJECTION, SOLUTION INTRAVENOUS at 08:11

## 2018-11-26 RX ADMIN — URSODIOL 300 MG: 300 CAPSULE ORAL at 09:11

## 2018-11-26 RX ADMIN — HEPARIN SODIUM 5000 UNITS: 5000 INJECTION, SOLUTION INTRAVENOUS; SUBCUTANEOUS at 01:11

## 2018-11-26 RX ADMIN — METRONIDAZOLE 500 MG: 500 TABLET ORAL at 01:11

## 2018-11-26 NOTE — PROGRESS NOTES
[facility]  Infectious Disease  Progress Note    Patient Name: Femi Enciso  MRN: [id number]  Admission Date: 10/27/2018  Length of Stay: 30 days  Attending Physician: Femi Patel MD  Primary Care Provider: Primary Doctor No    Isolation Status: Contact  Assessment/Plan:      Delirium    54yo man w/a history of DM2 and alcohol dependence with associated hepatitis who was admitted on 10/27/2018 with acute liver failure (c/b PSE, HRS, hepatic hydrothorax) and ultimately underwent liver transplantation (s/p DBDLT 11/11/2018, CMV D+/R-, steroid induction, on maintenance tacro/MMF/pred; c/b post-operative delirium, VRE bacteruria, and IA hemorrhage requiring re-do ex-lap, RP/retrorenal/retrocolic hematoma evacuation, cauterization of RP bleed, partial right adrenalectomy, and temporary vac abdominal closure on 11/16/2018 and planned benign second look washout on 11/18/2018). ID was consulted on 11/18 for VRE.faecium infected IA hematoma (with growth from washout cultures). His AMS, leukocytosis, and bilirubin have improved with expanded coverage of dapto/cefepime/flagyl and additional percutaneous drain insertion on 11/22.     - would continue daptomycin as VRE isolate is sensitive and this agent will be better tolerated long term (no expected significant myelosuppression as with linezolid); weekly CPK needed and contact precautions ordered  - would continue empiric cefepime/flagyl for coverage of IA ty in bile -- plan at least 7 course empirically  - would continue posaconazole for mold prophylaxis per institutional protocol (risk factor: hemorrhage/washout)  - remainder of prophylaxis per protocol  - will leave final antibiotics recs as he continues to improve and transitions to the floor         Anticipated Disposition: pending improvement    Thank you for your consult. I will follow-up with patient. Please contact us if you have any additional questions.     Rosalee Ireland MD  Transplant ID  Attending  [phone]    Rosalee Ireland MD  Infectious Disease  [facility]    Subjective:     Principal Problem:S/P liver transplant    HPI: No notes on file  Interval History: Doing well today. Afebrile. Drain output SS but remains large volume. Bili improving. Still with significant WBC.    Review of Systems   Constitutional: Negative for activity change, appetite change, chills, fatigue and fever.   HENT: Negative for congestion, ear pain, rhinorrhea and sore throat.    Eyes: Negative for pain, discharge and visual disturbance.   Respiratory: Negative for cough, chest tightness and shortness of breath.    Cardiovascular: Negative for chest pain and palpitations.   Gastrointestinal: Negative for abdominal distention, abdominal pain, blood in stool, constipation, diarrhea, nausea and vomiting.   Genitourinary: Negative for difficulty urinating.   Musculoskeletal: Negative for back pain and neck pain.   Skin: Negative for color change and rash.   Neurological: Negative for dizziness, numbness and headaches.   Hematological: Negative for adenopathy.   Psychiatric/Behavioral: Positive for behavioral problems and confusion.   All other systems reviewed and are negative.    Objective:     Vital Signs (Most Recent):  Temp: 97.6 °F (36.4 °C) (11/26/18 1617)  Pulse: 94 (11/26/18 1530)  Resp: 17 (11/26/18 1530)  BP: 103/69 (11/26/18 1530)  SpO2: 98 % (11/26/18 1530) Vital Signs (24h Range):  Temp:  [97.6 °F (36.4 °C)-98.3 °F (36.8 °C)] 97.6 °F (36.4 °C)  Pulse:  [] 94  Resp:  [13-34] 17  SpO2:  [96 %-100 %] 98 %  BP: ()/(42-77) 103/69     Weight: 93.2 kg (205 lb 7.5 oz)  Body mass index is 29.48 kg/m².    Estimated Creatinine Clearance: 57.6 mL/min (A) (based on SCr of 1.7 mg/dL (H)).    Physical Exam   Constitutional: He appears well-developed. No distress.   HENT:   Head: Atraumatic.   Mouth/Throat: Oropharynx is clear and moist. No oropharyngeal exudate.   Eyes: Conjunctivae and EOM are  normal. Pupils are equal, round, and reactive to light. Scleral icterus is present.   Neck: Neck supple.   Cardiovascular: Normal rate and regular rhythm. Exam reveals no friction rub.   No murmur heard.  Pulmonary/Chest: Breath sounds normal. No respiratory distress. He has no wheezes. He has no rales. He exhibits no tenderness.   Abdominal: Soft. Bowel sounds are normal. He exhibits distension. There is tenderness. There is no rebound and no guarding.   SS fluid in both drains.   Musculoskeletal: Normal range of motion. He exhibits edema.   Lymphadenopathy:     He has no cervical adenopathy.   Neurological: He is alert.   Skin: No rash noted. No erythema.       Significant Labs:   CBC:   Recent Labs   Lab 11/25/18  0300 11/26/18  0315   WBC 26.70* 25.37*   HGB 9.4* 9.1*   HCT 29.5* 28.0*   * 167     CMP:   Recent Labs   Lab 11/25/18  0300 11/25/18  2200 11/26/18  0315     140  140  140  140  140  140 140  140 141  141   K 5.0  5.0  5.0  5.0  5.0  5.0  5.0 4.4  4.4 4.2  4.2     109  109  109  109  109  109 108  108 110  110   CO2 20*  20*  20*  20*  20*  20*  20* 20*  20* 20*  20*   *  205*  205*  205*  205*  205*  205* 150*  150* 163*  163*   BUN 34*  34*  34*  34*  34*  34*  34* 29*  29* 33*  33*   CREATININE 1.9*  1.9*  1.9*  1.9*  1.9*  1.9*  1.9* 1.7*  1.7* 1.7*  1.7*   CALCIUM 7.7*  7.7*  7.7*  7.7*  7.7*  7.7*  7.7* 7.6*  7.6* 7.4*  7.4*   PROT 3.8*  --  3.8*   ALBUMIN 1.8*  1.8*  1.8*  1.8*  1.8*  1.8*  1.8* 1.7*  1.7* 1.6*  1.6*   BILITOT 8.4*  --  6.4*   ALKPHOS 365*  --  353*   AST 44*  --  58*   ALT 67*  --  74*   ANIONGAP 11  11  11  11  11  11  11 12  12 11  11   EGFRNONAA 39.4*  39.4*  39.4*  39.4*  39.4*  39.4*  39.4* 45.0*  45.0* 45.0*  45.0*       Significant Imaging: I have reviewed all pertinent imaging results/findings within the past 24 hours.

## 2018-11-26 NOTE — PROGRESS NOTES
[facility]  Liver Transplant  Progress Note    Patient Name: Femi Enciso  MRN: [id number]  Admission Date: 10/27/2018  Hospital Length of Stay: 30 days  Code Status: Full Code  Primary Care Provider: Primary Doctor No  Post-Op Day 8 Days Post-Op    ORGAN:   LIVER  Disease Etiology: Alcoholic Cirrhosis  Donor Type:    - Brain Death  CDC High Risk:   No  Donor CMV Status:   Donor CMV Status: Positive  Donor HBcAB:   Negative  Donor HCV Status:   Negative  Donor HBV JAVIER: Negative  Donor HCV JAVIER: Negative  Whole or Partial: Whole Liver  Biliary Anastomosis: End to End  Arterial Anatomy: Standard    Subjective:   NAEON. Alert and oriented this AM     Scheduled Meds:   sodium chloride 0.9%   Intravenous Once    sodium chloride 0.9%   Intravenous Once    ceFEPime (MAXIPIME) IVPB  1 g Intravenous Q12H    DAPTOmycin (CUBICIN)  IV  10 mg/kg Intravenous Q24H    famotidine (PF)  20 mg Intravenous BID    ganciclovir (CYTOVENE) IVPB  2.5 mg/kg (Dosing Weight) Intravenous Q24H    heparin (porcine)  5,000 Units Subcutaneous Q8H    hydrocortisone sodium succinate  50 mg Intravenous Q8H    custom IVPB builder  372 mg Intravenous Q24H    metronidazole  500 mg Intravenous Q8H    QUEtiapine  50 mg Oral QHS    tacrolimus  2 mg Sublingual BID    ursodiol  300 mg Oral BID     Continuous Infusions:  PRN Meds:sodium chloride 0.9%, sodium chloride 0.9%, albuterol-ipratropium, dextrose 50%, glucagon (human recombinant), heparin (porcine), HYDROmorphone, insulin aspart U-100, oxyCODONE, oxyCODONE, potassium phosphate IVPB, ramelteon    Review of Systems  Objective:     Vital Signs (Most Recent):  Temp: 98.3 °F (36.8 °C) (18 0300)  Pulse: 88 (18 0715)  Resp: 19 (18 0715)  BP: 106/68 (18 0715)  SpO2: 96 % (18 0417) Vital Signs (24h Range):  Temp:  [97.8 °F (36.6 °C)-98.3 °F (36.8 °C)] 98.3 °F (36.8 °C)  Pulse:  [] 88  Resp:  [13-34] 19  SpO2:  [96 %-100 %] 96 %  BP:  ()/(42-78) 106/68     Weight: 93.2 kg (205 lb 7.5 oz)  Body mass index is 29.48 kg/m².    Intake/Output - Last 3 Shifts       11/24 0700 - 11/25 0659 11/25 0700 - 11/26 0659 11/26 0700 - 11/27 0659    P.O.  1540     I.V. (mL/kg) 754 (7.8) 533 (5.7)     Other  450     Total Intake(mL/kg) 754 (7.8) 2523 (27.1)     Urine (mL/kg/hr)  0 (0)     Drains 3110 4310     Other  2000     Stool 0 0     Total Output 3110 6310     Net -5215 -6850            Urine Occurrence  1 x     Stool Occurrence 2 x 2 x           Physical Exam   Constitutional: He appears well-developed. No distress.   HENT:   Head: Atraumatic.   Mouth/Throat: Oropharynx is clear and moist. No oropharyngeal exudate.   Eyes: Conjunctivae and EOM are normal. Pupils are equal, round, and reactive to light. Scleral icterus is present.   Neck: Neck supple.   Cardiovascular: Normal rate and regular rhythm. Exam reveals no friction rub.   No murmur heard.  Pulmonary/Chest: Breath sounds normal. No respiratory distress. He has no wheezes. He has no rales. He exhibits no tenderness.   Abdominal: Soft. Bowel sounds are normal. He exhibits distension. There is tenderness. There is no rebound and no guarding.   SS fluid in both drains.   Musculoskeletal: Normal range of motion. He exhibits edema.   Lymphadenopathy:     He has no cervical adenopathy.   Neurological: He is alert.   Skin: No rash noted. No erythema.       Laboratory:  Immunosuppressants         Stop Route Frequency     tacrolimus capsule 2 mg      -- SL 2 times daily        Labs within the past 24 hours have been reviewed.    Diagnostic Results:  I have personally reviewed all pertinent imaging studies.    Assessment/Plan:   Femi Enciso is a 53 y.o. 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 who is s/p liver transplant c/b take-back x 3 for bleeding most recently 11/18    Neuro  -acute change in mental status with confusion and agitation on 11/14,  controlled with PRN ativan   -monitor neuro exam - alert, responding more appropriately this morning, slept well overnight  - CT head 11/21 negative for acute infarct  -Seroquel nightly, remain at 50mg tonight   -awake, alert and oriented on exam today     CV  -continues to be HDS off pressors  -swan tricia catheter removed  -monitor on telemetry     Resp  -Extubated, on room air  -ABGs & CXR prn  -continue to monitor O2 sats   -CXR stable      Heme  -H/H stable overnight   -daily CBCs, transfuse as needed     ID  -monitor fever and WBC, 25 from 26  - afebrile   -f/u cultures: enterococcus faecium - sensitive to linezolid and daptomycin   -Abx: Cipro, fluconazole and daptomycin (switched from linezolid 11/21)     MSK  -PT/OT  -Encourage OOBTC    FEN/GI  -replace lytes   -Renal diet - NPO while disoriented, failed bedside swallow, will consult speech to assess  -s/p ex-lap 11/16 requiring take-back for bleeding, left open, now s/p closure 11/18  -CT chest with evidence of hematoma vs. Abscess in close proximity to the liver - s/p IR drain placement 11/22, f/u gram stain and clx   -passed bedside swallow by nursing staff yesterday and was given ice chips and sips of water. Formal swallow study by speech therapy today. Will give diet if he passes swallow study today.      Endocrine  -insulin as needed. Monitor BG       -Tolerated HD  -monitor Bun/Cr    dispo  -stepdown to TSU         The patients clinical status was discussed at multidisplinary rounds, involving transplant surgery, transplant medicine, pharmacy, nursing, nutrition, and social work.    Nadia Michael MD  Liver Transplant  [facility]

## 2018-11-26 NOTE — PT/OT/SLP PROGRESS
Occupational Therapy   Treatment    Name: Femi Enciso  MRN: [id number]  Admitting Diagnosis:  Acute encephalopathy  8 Days Post-Op    Recommendations:     Discharge Recommendations: nursing facility, skilled  Discharge Equipment Recommendations:  (TBD closer to d/c)  Barriers to discharge:  None    Subjective     Pain/Comfort:  · Pain Rating 1: 0/10  · Pain Rating Post-Intervention 1: 0/10    Objective:     Communicated with: RN prior to session.  Patient found with  blood pressure cuff, pulse ox (continuous), telemetry, central line, restraints, peripheral IV, JOSE A drain(SC Kun catheter) upon OT entry to room.    General Precautions: Standard, fall   Orthopedic Precautions:N/A   Braces:       Occupational Performance:    Bed Mobility:    · Patient completed Rolling/Turning to Left with  total assistance  · Patient completed Scooting/Bridging with maximal assistance to EOB  · Patient completed Supine to Sit with total assistance     Functional Mobility/Transfers:  · Patient completed Sit <> Stand Transfer with maximal assistance  with  no assistive device from EOB and b/s chair  · Patient completed Bed <> Chair Transfer using Stand Pivot technique with total assistance with no assistive device  · Functional Mobility: NT    Activities of Daily Living:  · Grooming: minimum assistance seated EOB  · Upper Body Dressing: maximal assistance    · Lower Body Dressing: total assistance        AMPA 6 Click ADL: 14    Treatment & Education:  Pt ed on OT POC  Pt ed on safety with transfers and self-care  Pt provided with yellow Theraband HEP and instructed in use    Patient left supine with all lines intact, call button in reach, RN notified and family present  Education:    Assessment:     Femi Enciso is a 53 y.o. male with a medical diagnosis of Acute encephalopathy.  He presents with the following performance deficits affecting function are weakness, gait instability, impaired balance, impaired endurance, impaired  cardiopulmonary response to activity, decreased safety awareness, impaired self care skills, impaired functional mobilty, impaired cognition.     Rehab Prognosis:  Good; patient would benefit from acute skilled OT services to address these deficits and reach maximum level of function.       Plan:     Patient to be seen 4 x/week to address the above listed problems via self-care/home management, therapeutic activities, therapeutic exercises  · Plan of Care Expires: 12/21/18  · Plan of Care Reviewed with: patient, spouse, mother    This Plan of care has been discussed with the patient who was involved in its development and understands and is in agreement with the identified goals and treatment plan    GOALS:   Multidisciplinary Problems     Occupational Therapy Goals        Problem: Occupational Therapy Goal    Goal Priority Disciplines Outcome Interventions   Occupational Therapy Goal     OT, PT/OT Ongoing (interventions implemented as appropriate)    Description:  Goals to be met by: 12/5/18     Patient will increase functional independence with ADLs by performing:    UE Dressing with Supervision.  LE Dressing with Minimal Assistance.  Grooming while standing at sink with Stand-by Assistance.  Toileting from toilet with Minimal Assistance for hygiene and clothing management.   Toilet transfer to toilet with Stand-by Assistance.  Upper extremity  theraband exercise program x  15 reps  with independence.               Problem: Occupational Therapy Goal    Goal Priority Disciplines Outcome Interventions   Occupational Therapy Goal     OT, PT/OT Ongoing (interventions implemented as appropriate)                    Time Tracking:     OT Date of Treatment: 11/26/18  OT Start Time: 0842(second visit )  OT Stop Time: 0856(second visit)  OT Total Time (min): 14 min    Billable Minutes:Self Care/Home Management 10  Therapeutic Exercise 13    ISACC Blake  11/26/2018

## 2018-11-26 NOTE — SUBJECTIVE & OBJECTIVE
Scheduled Meds:   sodium chloride 0.9%   Intravenous Once    sodium chloride 0.9%   Intravenous Once    ceFEPime (MAXIPIME) IVPB  1 g Intravenous Q12H    DAPTOmycin (CUBICIN)  IV  10 mg/kg Intravenous Q24H    famotidine (PF)  20 mg Intravenous BID    ganciclovir (CYTOVENE) IVPB  2.5 mg/kg (Dosing Weight) Intravenous Q24H    heparin (porcine)  5,000 Units Subcutaneous Q8H    hydrocortisone sodium succinate  50 mg Intravenous Q8H    custom IVPB builder  372 mg Intravenous Q24H    metronidazole  500 mg Intravenous Q8H    QUEtiapine  50 mg Oral QHS    tacrolimus  2 mg Sublingual BID    ursodiol  300 mg Oral BID     Continuous Infusions:  PRN Meds:sodium chloride 0.9%, sodium chloride 0.9%, albuterol-ipratropium, dextrose 50%, glucagon (human recombinant), heparin (porcine), HYDROmorphone, insulin aspart U-100, oxyCODONE, oxyCODONE, potassium phosphate IVPB, ramelteon    Review of Systems  Objective:     Vital Signs (Most Recent):  Temp: 98.3 °F (36.8 °C) (11/26/18 0300)  Pulse: 88 (11/26/18 0715)  Resp: 19 (11/26/18 0715)  BP: 106/68 (11/26/18 0715)  SpO2: 96 % (11/26/18 0417) Vital Signs (24h Range):  Temp:  [97.8 °F (36.6 °C)-98.3 °F (36.8 °C)] 98.3 °F (36.8 °C)  Pulse:  [] 88  Resp:  [13-34] 19  SpO2:  [96 %-100 %] 96 %  BP: ()/(42-78) 106/68     Weight: 93.2 kg (205 lb 7.5 oz)  Body mass index is 29.48 kg/m².    Intake/Output - Last 3 Shifts       11/24 0700 - 11/25 0659 11/25 0700 - 11/26 0659 11/26 0700 - 11/27 0659    P.O.  1540     I.V. (mL/kg) 754 (7.8) 533 (5.7)     Other  450     Total Intake(mL/kg) 754 (7.8) 2523 (27.1)     Urine (mL/kg/hr)  0 (0)     Drains 3110 4310     Other  2000     Stool 0 0     Total Output 3110 0908     Net -4187 -8032            Urine Occurrence  1 x     Stool Occurrence 2 x 2 x           Physical Exam   Constitutional: He appears well-developed. No distress.   HENT:   Head: Atraumatic.   Mouth/Throat: Oropharynx is clear and moist. No oropharyngeal exudate.    Eyes: Conjunctivae and EOM are normal. Pupils are equal, round, and reactive to light. Scleral icterus is present.   Neck: Neck supple.   Cardiovascular: Normal rate and regular rhythm. Exam reveals no friction rub.   No murmur heard.  Pulmonary/Chest: Breath sounds normal. No respiratory distress. He has no wheezes. He has no rales. He exhibits no tenderness.   Abdominal: Soft. Bowel sounds are normal. He exhibits distension. There is tenderness. There is no rebound and no guarding.   SS fluid in both drains.   Musculoskeletal: Normal range of motion. He exhibits edema.   Lymphadenopathy:     He has no cervical adenopathy.   Neurological: He is alert.   Skin: No rash noted. No erythema.       Laboratory:  Immunosuppressants         Stop Route Frequency     tacrolimus capsule 2 mg      -- SL 2 times daily        Labs within the past 24 hours have been reviewed.    Diagnostic Results:  I have personally reviewed all pertinent imaging studies.

## 2018-11-26 NOTE — PROGRESS NOTES
Attempted straight cath per nephrology to assess urine production. Unable to advance catheter past prostate. Nephrology and primary team notified.

## 2018-11-26 NOTE — PLAN OF CARE
"Problem: Patient Care Overview  Goal: Plan of Care Review  Dx: Liver transplant (11/11)  hx: ESLD, ETOH abuse; Severe depression.    11/12: liver transplant; extubated. Products given in OR and HD in OR. CRRT nocturnal.  11/13: CRRT nocturnal held; lines removed. FFP x1 given. Pulled out 2 JOSE A drains.   11/14: 3 PRBC's given for hgb 6; liver US shows potential hematoma. Trend CBC. Extreme agitation/attempted to "kill self" by holding breath for as long as possible multiple times. IV ativan/haldol given. Nocturnal CRRT restarted  Potential washout  To be determined.    11/16: OR for exlap and washout--1U PRBCs and 1 plts; sent back to OR--3 U PRBCs, 1FFP, 1cryo; abd open and wound vac placed  11/17: CT chest, abd, pelvis, levo off 2 units PRBCs, 2 units platelets, 1 unit FFP   11/18: OR for closure, extubated   11/19: clear liquid diet   11/20: TPN/Lipids d/c'd, renal diet, transfer orders  11/21: Head and chest CT  11/22: Pt to IR for drain placement  11/23: HD trial  11/25: HD 2 Liters removed  Nursing:   -160         Outcome: Ongoing (interventions implemented as appropriate)  No acute events throughout the night. VSS. Pt AAOx4. Slept through the night 8hrs. A little confused this morning but still oriented x4. Remains on room air satting %. See flowsheets for assessments and intake and output. Floor orders in place. Plan of care reviewed with patient and patients daughter. Will continue to monitor.             "

## 2018-11-26 NOTE — PROGRESS NOTES
" [facility]  Adult Nutrition  Progress Note    SUMMARY       Recommendations  Recommendation/Intervention:   1. As medically able, ADAT to Diabetic, Renal with texture per SLP.   2. If unable to advance diet, recommend initiating Custom TPN 100g AA and 350g Dextrose + IV lipids daily - to provide 2090 kcal/day and 100g protein/day.   3. TSU RD to provide post-transplant diet education when appropriate.   RD to monitor.    Goals: Patient to receive nutrition by RD follow-up  Nutrition Goal Status: new  Communication of RD Recs: (POC)    Reason for Assessment  Reason for Assessment: RD follow-up  Diagnosis: transplant/postoperative complications(s/p OLTx )  Relevant Medical History: ESLD 2/2 alcoholic cirrhosis, ESRD on HD, DM2  Interdisciplinary Rounds: did not attend  General Information Comments: Patient was on TPN but stopped and started on diet. But now with confusion/AMS so made NPO . CRRT transitioned to HD. (NFPE completed 10/31, patient with moderate malnutrition.)  Nutrition Discharge Planning: TSU RD to provide post-transplant diet education when appropriate.    Nutrition Risk Screen  Nutrition Risk Screen: no indicators present    Nutrition/Diet History  Patient Reported Diet/Restrictions/Preferences: general  Do you have any cultural, spiritual, Restorationism conflicts, given your current situation?: none reported   Factors Affecting Nutritional Intake: NPO    Anthropometrics  Temp: 97.8 °F (36.6 °C)  Height Method: Stated  Height: 5' 10" (177.8 cm)  Height (inches): 70 in  Weight Method: Bed Scale  Weight: 93.2 kg (205 lb 7.5 oz)  Weight (lb): 205.47 lb  Ideal Body Weight (IBW), Male: 166 lb  % Ideal Body Weight, Male (lb): 119.13 lb  BMI (Calculated): 28.4  BMI Grade: 25 - 29.9 - overweight  Usual Body Weight (UBW), k.5 kg  % Usual Body Weight: 86.67  % Weight Change From Usual Weight: -13.51 %    Lab/Procedures/Meds  Pertinent Labs Reviewed: reviewed  Pertinent Labs " Comments: BUN 33, Creat 1.7, Ca 7.4, Alb 1.6, POCT Glu 122-221, HgbA1c 4.4  Pertinent Medications Reviewed: reviewed  Pertinent Medications Comments: famotidine, prograf    Physical Findings/Assessment  Overall Physical Appearance: on oxygen therapy, loss of muscle mass, loss of subcutaneous fat  Oral/Mouth Cavity: tooth/teeth missing  Skin: edema, incision(s)    Estimated/Assessed Needs  Weight Used For Calorie Calculations: 83.7 kg (184 lb 8.4 oz)(dosing weight)  Energy Calorie Requirements (kcal): 2110 kcal/day  Energy Need Method: [place]-St Jeor(x 1.25)  Protein Requirements:  g/day(1.0-1.2 g/kg)  Weight Used For Protein Calculations: 83.7 kg (184 lb 8.4 oz)(dosing weight)  Fluid Requirements (mL): 1 mL/kcal or per MD  Fluid Need Method: RDA Method  RDA Method (mL): 2110    Nutrition Prescription Ordered  Current Diet Order: NPO    Evaluation of Received Nutrient/Fluid Intake  I/O: -3.5L x 24hrs, +8.3L since admit  Comments: LBM 11/25  % Intake of Estimated Energy Needs: 0 - 25 %  % Meal Intake: NPO    Nutrition Risk  Level of Risk/Frequency of Follow-up: high(2x/week)     Assessment and Plan  Moderate protein malnutrition-MD resolved on problem list on 11/26/2018, RD does not feel it is resolved    Nutrition Problem  Malnutrition in the context of Chronic Illness/Injury    Related to (etiology):  Cirrhosis and poor appetite    Signs and Symptoms (as evidenced by):  Energy Intake: <75% of estimated energy requirement for 2 months  Body Fat Depletion: moderate depletion of orbitals and triceps   Muscle Mass Depletion: moderate depletion of temples, clavicle region and scapular region   Weight Loss: 14% x 2 months (per patient, unable to verify per chart review)    Interventions/Recommendations (treatment strategy):  Full assessment completed, see RD Note 11/26/2018.    Nutrition Diagnosis Status:  Continues     Monitor and Evaluation  Food and Nutrient Intake: energy intake, food and beverage intake  Food  and Nutrient Adminstration: diet order, enteral and parenteral nutrition administration  Knowledge/Beliefs/Attitudes: food and nutrition knowledge/skill  Physical Activity and Function: nutrition-related ADLs and IADLs  Anthropometric Measurements: weight, weight change  Biochemical Data, Medical Tests and Procedures: electrolyte and renal panel, gastrointestinal profile, glucose/endocrine profile, inflammatory profile  Nutrition-Focused Physical Findings: overall appearance     Nutrition Follow-Up  RD Follow-up?: Yes

## 2018-11-26 NOTE — PROGRESS NOTES
Patient transferred to US per stretcher by transport aide. No acute distress noted. Will monitor for return.

## 2018-11-26 NOTE — PROGRESS NOTES
SLP has not come to see patient since 11/23/18. Pt refusing to follow restrictions. MD's aware. MARCIO swallow screen performed and passed. Will wait to order diet at this time until SLP can see patient to further evaluate. Will monitor patient closely for signs of aspiration.

## 2018-11-26 NOTE — SUBJECTIVE & OBJECTIVE
"Interval HPI:   Overnight events: Transferred from ICU to TSU yesterday afternoon, NAEON.  BG well controlled overnight without correction scale insulin administration.  Prednisone 15 mg daily, standard steroid taper.  Noted rise in creatinine, 2.6 today.  On IV antibiotics.  Eatin%  Nausea: Yes  Hypoglycemia and intervention: No  Fever: No  TPN and/or TF: No    /69   Pulse 91   Temp 97.8 °F (36.6 °C) (Oral)   Resp 18   Ht 5' 10" (1.778 m)   Wt 93.2 kg (205 lb 7.5 oz)   SpO2 (P) 98%   BMI 29.48 kg/m²     Labs Reviewed and Include    Recent Labs   Lab 18  0315   *  163*   CALCIUM 7.4*  7.4*   ALBUMIN 1.6*  1.6*   PROT 3.8*     141   K 4.2  4.2   CO2 20*  20*     110   BUN 33*  33*   CREATININE 1.7*  1.7*   ALKPHOS 353*   ALT 74*   AST 58*   BILITOT 6.4*     Lab Results   Component Value Date    WBC 25.37 (H) 2018    HGB 9.1 (L) 2018    HCT 28.0 (L) 2018    MCV 93 2018     2018     No results for input(s): TSH, FREET4 in the last 168 hours.  Lab Results   Component Value Date    HGBA1C 4.4 2018       Nutritional status:   Body mass index is 29.48 kg/m².  Lab Results   Component Value Date    ALBUMIN 1.6 (L) 2018    ALBUMIN 1.6 (L) 2018    ALBUMIN 1.7 (L) 2018    ALBUMIN 1.7 (L) 2018     Lab Results   Component Value Date    PREALBUMIN 7 (L) 10/27/2018       Estimated Creatinine Clearance: 57.6 mL/min (A) (based on SCr of 1.7 mg/dL (H)).    Accu-Checks  Recent Labs     18  1321 18  1815 18  2131 18  0331   POCTGLUCOSE 221* 166* 174* 178*       Current Medications and/or Treatments Impacting Glycemic Control  Immunotherapy:    Immunosuppressants         Stop Route Frequency     tacrolimus capsule 2 mg      -- SL 2 times daily        Steroids:   Hormones (From admission, onward)    Start     Stop Route Frequency Ordered    18 1400  hydrocortisone sodium succinate injection " 50 mg      -- IV Every 8 hours 11/22/18 0819    11/09/18 1345  methylPREDNISolone sodium succinate injection 500 mg  (Med - Immunosuppression Induction Therapy (Methylprednisolone))      11/10 0144 IV Once pre-op 11/09/18 1236        Pressors:    Autonomic Drugs (From admission, onward)    None        Hyperglycemia/Diabetes Medications:   Antihyperglycemics (From admission, onward)    Start     Stop Route Frequency Ordered    11/25/18 0901  insulin aspart U-100 pen 1-10 Units      -- SubQ Every 6 hours PRN 11/25/18 0801

## 2018-11-26 NOTE — PLAN OF CARE
Problem: Physical Therapy Goal  Goal: Physical Therapy Goal  Goals to be met by:      Patient will increase functional independence with mobility by performin. Supine to sit with Modified [place] -not met  2. Sit to stand transfer with [place] -not met  3. Bed to chair transfer with independence using no AD -not met  4. Gait  x150 feet with Supervision using LRAD. -not met  5. Lower extremity exercise program x20 reps per handout, with independence -not met       Goals remain appropriate. Nataliya Rushing, PT 2018

## 2018-11-26 NOTE — CARE UPDATE
BG goal 140-180. BG at or slightly above goal with correction scale.       Continue BG monitoring every 6 hours while NPO and moderate dose correction scale.       Please notify endocrine when diet is advanced to adjust insulin orders.        ** Please call Endocrine for any BG related issues **

## 2018-11-26 NOTE — ASSESSMENT & PLAN NOTE
53 year old male with history of ESLD 2/2 ETOH cirrhosis who is s/p liver transplant c/b take-back x 3 for bleeding most recently 11/18    Neuro  -acute change in mental status with confusion and agitation on 11/14, controlled with PRN ativan   -monitor neuro exam - alert, responding more appropriately this morning, slept well overnight  - CT head 11/21 negative for acute infarct  -Seroquel nightly, remain at 50mg tonight   -awake, alert and oriented on exam today     CV  -continues to be HDS off pressors  -swan tricia catheter removed  -monitor on telemetry     Resp  -Extubated, on room air  -ABGs & CXR prn  -continue to monitor O2 sats   -CXR stable      Heme  -H/H stable overnight   -daily CBCs, transfuse as needed     ID  -monitor fever and WBC, 25 from 26  - afebrile   -f/u cultures: enterococcus faecium - sensitive to linezolid and daptomycin   -Abx: Cipro, fluconazole and daptomycin (switched from linezolid 11/21)     MSK  -PT/OT  -Encourage OOBTC    FEN/GI  -replace lytes   -Renal diet - NPO while disoriented, failed bedside swallow, will consult speech to assess  -s/p ex-lap 11/16 requiring take-back for bleeding, left open, now s/p closure 11/18  -CT chest with evidence of hematoma vs. Abscess in close proximity to the liver - s/p IR drain placement 11/22, f/u gram stain and clx   -passed bedside swallow by nursing staff yesterday and was given ice chips and sips of water. Formal swallow study by speech therapy today. Will give diet if he passes swallow study today.      Endocrine  -insulin as needed. Monitor BG       -Tolerated HD  -monitor Bun/Cr    dispo  -stepdown to TSU

## 2018-11-26 NOTE — PLAN OF CARE
Goals to be met by: 12/5/18  Patient will increase functional independence with ADLs by performing:     UE Dressing with Supervision.  LE Dressing with Minimal Assistance.  Grooming while standing at sink with Stand-by Assistance.  Toileting from toilet with Minimal Assistance for hygiene and clothing management.   Toilet transfer to toilet with Stand-by Assistance.  Upper extremity  theraband exercise program x  15 reps  with independence.         Problem: Occupational Therapy Goal  Goal: Occupational Therapy Goal  Outcome: Ongoing (interventions implemented as appropriate)  The above goals remain appropriate. ISACC Blake  11/26/2018

## 2018-11-26 NOTE — PT/OT/SLP PROGRESS
"Speech Language Pathology Treatment    Patient Name:  Femi Enciso   MRN:  [id number]  Admitting Diagnosis: Acute encephalopathy    Recommendations:                 General Recommendations:  Dysphagia therapy  Diet recommendations:  Regular, Liquid Diet Level: Thin   Aspiration Precautions: 1 bite/sip at a time, Alternating bites/sips, Avoid talking while eating, Eliminate distractions, Feed only when awake/alert, HOB to 90 degrees, Meds whole buried in puree, No straws, Small bites/sips and Strict aspiration precautions   General Precautions: Standard, aspiration  Communication strategies:  provide increased time to answer and go to room if call light pushed    Subjective     SLP reviewed Patient with nurse, nurse reported pt eating soft foods and ice chips over the weekend   Pt explains, "I had mac n' cheese for dinner last night"  Spouse explains, "He has been taking some of his pills whole with the pudding"  He denies pain      Pain/Comfort:  · Pain Rating 1: 0/10    Objective:     Has the patient been evaluated by SLP for swallowing?   Yes  Keep patient NPO? No   Current Respiratory Status: room air    CXR 11/26/2018: No significant interval change in the appearance of the chest since 11/25/2018 is appreciated.  No pneumothorax.    Pt seen for ongoing swallow assessment. Pt found with PT/OT in room, family and nurse at beside. . SLP initiated session following PT/OT exit. HOB elevated. Pt alert and oriented x4.  He followed commands WNL. Labial, lingual and mandibular strength and coordination deemed WNL. He was seen with cup edge sips thin liquids x5 and trials of regular solids (bites of trish cracker,) self-fed. No overt S/S aspiration noted with trials presented.  He was noted to use double swallows and lingual sweeps between bites of trish crackers I'ly.  Patient noted to use appropriate sized cup edge sips and pace for sips/bites this service day without redirection or cues from SLP.  Pt and family;y " educated on S/S aspiration, safe swallow precautions, diet recs and ongoing SLP POC. No questions noted. Whiteboard updated. Findings/recs reviewed with nurse and MD team following session.     Assessment:     Femi Enciso is a 53 y.o. male with an SLP diagnosis of Dysphagia.  He presents with increased alertness.  Risk of aspiration remains 2/2 waxing/waning mental status and fatigue. Please Continue to monitor for signs and symptoms of aspiration and discontinue oral feeding should you notice any of the following: watery eyes, reddened facial area, wet vocal quality, increased work of breathing, change in respiratory status, increased congestion, coughing, fever, etc.      Goals:   Multidisciplinary Problems     SLP Goals        Problem: SLP Goal    Goal Priority Disciplines Outcome   SLP Goal     SLP Ongoing (interventions implemented as appropriate)   Description:  Speech Language Pathology Goals  Goals expected to be met by 11/30/2018  1. Pt will participate in ongoing swallow assessment  2. Pt will tolerate regular diet with thin liquids, MOD I  3. Educate Pt and family on S/S aspiration                           Plan:     · Patient to be seen:  4 x/week   · Plan of Care expires:  12/23/18  · Plan of Care reviewed with:  patient, mother, daughter   · SLP Follow-Up:  Yes       Discharge recommendations:  nursing facility, skilled   Barriers to Discharge:  None    Time Tracking:     SLP Treatment Date:   11/26/18  Speech Start Time:  1029  Speech Stop Time:  1041     Speech Total Time (min):  12 min    Billable Minutes: Treatment Swallowing Dysfunction 12    JESSICA Rocha, [facility]-SLP  Speech-Language Pathology  Pager: [phone]      11/26/2018

## 2018-11-26 NOTE — SUBJECTIVE & OBJECTIVE
Interval History: Doing well today. Afebrile. Drain output SS but remains large volume. Bili improving. Still with significant WBC.    Review of Systems   Constitutional: Negative for activity change, appetite change, chills, fatigue and fever.   HENT: Negative for congestion, ear pain, rhinorrhea and sore throat.    Eyes: Negative for pain, discharge and visual disturbance.   Respiratory: Negative for cough, chest tightness and shortness of breath.    Cardiovascular: Negative for chest pain and palpitations.   Gastrointestinal: Negative for abdominal distention, abdominal pain, blood in stool, constipation, diarrhea, nausea and vomiting.   Genitourinary: Negative for difficulty urinating.   Musculoskeletal: Negative for back pain and neck pain.   Skin: Negative for color change and rash.   Neurological: Negative for dizziness, numbness and headaches.   Hematological: Negative for adenopathy.   Psychiatric/Behavioral: Positive for behavioral problems and confusion.   All other systems reviewed and are negative.    Objective:     Vital Signs (Most Recent):  Temp: 97.6 °F (36.4 °C) (11/26/18 1617)  Pulse: 94 (11/26/18 1530)  Resp: 17 (11/26/18 1530)  BP: 103/69 (11/26/18 1530)  SpO2: 98 % (11/26/18 1530) Vital Signs (24h Range):  Temp:  [97.6 °F (36.4 °C)-98.3 °F (36.8 °C)] 97.6 °F (36.4 °C)  Pulse:  [] 94  Resp:  [13-34] 17  SpO2:  [96 %-100 %] 98 %  BP: ()/(42-77) 103/69     Weight: 93.2 kg (205 lb 7.5 oz)  Body mass index is 29.48 kg/m².    Estimated Creatinine Clearance: 57.6 mL/min (A) (based on SCr of 1.7 mg/dL (H)).    Physical Exam   Constitutional: He appears well-developed. No distress.   HENT:   Head: Atraumatic.   Mouth/Throat: Oropharynx is clear and moist. No oropharyngeal exudate.   Eyes: Conjunctivae and EOM are normal. Pupils are equal, round, and reactive to light. Scleral icterus is present.   Neck: Neck supple.   Cardiovascular: Normal rate and regular rhythm. Exam reveals no friction  rub.   No murmur heard.  Pulmonary/Chest: Breath sounds normal. No respiratory distress. He has no wheezes. He has no rales. He exhibits no tenderness.   Abdominal: Soft. Bowel sounds are normal. He exhibits distension. There is tenderness. There is no rebound and no guarding.   SS fluid in both drains.   Musculoskeletal: Normal range of motion. He exhibits edema.   Lymphadenopathy:     He has no cervical adenopathy.   Neurological: He is alert.   Skin: No rash noted. No erythema.       Significant Labs:   CBC:   Recent Labs   Lab 11/25/18 0300 11/26/18 0315   WBC 26.70* 25.37*   HGB 9.4* 9.1*   HCT 29.5* 28.0*   * 167     CMP:   Recent Labs   Lab 11/25/18 0300 11/25/18 2200 11/26/18 0315     140  140  140  140  140  140 140  140 141  141   K 5.0  5.0  5.0  5.0  5.0  5.0  5.0 4.4  4.4 4.2  4.2     109  109  109  109  109  109 108  108 110  110   CO2 20*  20*  20*  20*  20*  20*  20* 20*  20* 20*  20*   *  205*  205*  205*  205*  205*  205* 150*  150* 163*  163*   BUN 34*  34*  34*  34*  34*  34*  34* 29*  29* 33*  33*   CREATININE 1.9*  1.9*  1.9*  1.9*  1.9*  1.9*  1.9* 1.7*  1.7* 1.7*  1.7*   CALCIUM 7.7*  7.7*  7.7*  7.7*  7.7*  7.7*  7.7* 7.6*  7.6* 7.4*  7.4*   PROT 3.8*  --  3.8*   ALBUMIN 1.8*  1.8*  1.8*  1.8*  1.8*  1.8*  1.8* 1.7*  1.7* 1.6*  1.6*   BILITOT 8.4*  --  6.4*   ALKPHOS 365*  --  353*   AST 44*  --  58*   ALT 67*  --  74*   ANIONGAP 11  11  11  11  11  11  11 12  12 11  11   EGFRNONAA 39.4*  39.4*  39.4*  39.4*  39.4*  39.4*  39.4* 45.0*  45.0* 45.0*  45.0*       Significant Imaging: I have reviewed all pertinent imaging results/findings within the past 24 hours.

## 2018-11-26 NOTE — ASSESSMENT & PLAN NOTE
53 year old male with history of ESLD 2/2 ETOH cirrhosis who is s/p liver transplant c/b take-back x 3 for bleeding most recently 11/18.  - LFTs now improving slowly.   - Pt remains with significant debility. Pt will need SNF placement when medically stable.   - Appetite now improving and encourage PO intake.   - Recently with intra-abdominal abscess requiring drain placement on 11/22.   - Fluid from collection noted with enterococcus VRE. Cont with antibxs.   - Pain well controlled, and having BMs.    - HD per nephrology. Last HD on 11/25.

## 2018-11-26 NOTE — ASSESSMENT & PLAN NOTE
Nutrition Problem  Malnutrition in the context of Chronic Illness/Injury    Related to (etiology):  Cirrhosis and poor appetite    Signs and Symptoms (as evidenced by):  Energy Intake: <75% of estimated energy requirement for 2 months  Body Fat Depletion: moderate depletion of orbitals and triceps   Muscle Mass Depletion: moderate depletion of temples, clavicle region and scapular region   Weight Loss: 14% x 2 months (per patient, unable to verify per chart review)    Interventions/Recommendations (treatment strategy):  Full assessment completed, see RD Note 11/26/2018.    Nutrition Diagnosis Status:  Continues

## 2018-11-26 NOTE — PROGRESS NOTES
Patient arrived to TSU [id number] per hospital bed by ICU RN.   - Patient is AAOx4, oriented to room and call bell - educated to use call bell for assistance, verbalized understanding.   - Patient's mother and spouse at bedside attentive to patient's needs.   - Patient arrived with telemetry monitoring in progress, per H. Leyda NP - no need for telemetry. Patient's VSS, connected to Visi monitor.   - Chevron incision ECTOR with staples, R JOSE A #1 with serosanguinous/brown output, R grenade drain #2 with serosanguinous/brown output. L Abdomen leaking serous fluid from old drain site - ABD applied.   - Foam dressings in place to skin tears on R/L legs. No further skin breakdown noted.   - See flow sheet for complete assessment details

## 2018-11-26 NOTE — PLAN OF CARE
Problem: SLP Goal  Goal: SLP Goal  Speech Language Pathology Goals  Goals expected to be met by 11/30/2018  1. Pt will participate in ongoing swallow assessment  2. Pt will tolerate regular diet with thin liquids, MOD I  3. Educate Pt and family on S/S aspiration         Outcome: Ongoing (interventions implemented as appropriate)  Pt seen for ongoing swallow assessment. Patient with increased attention and command following. No overt S/S aspiration with trials presented. Patient presents with risk for aspiration 2/2 fatigue and waxing/waning mental status.   REC: regular diet with thin liquids, medications one at a time or buried in puree, provided strict aspiration precautions, no straws and minimally distracting environment. ST to f/u to monitor.     SHUBHAM Rocha., [facility]-SLP  Speech-Language Pathology  Pager: [phone]  11/26/2018

## 2018-11-26 NOTE — PT/OT/SLP PROGRESS
Physical Therapy Co-Treatment with OT    Patient Name:  Femi Enciso   MRN:  [id number]    Recommendations:     Discharge Recommendations:  nursing facility, skilled   Discharge Equipment Recommendations: (will determine DME needs closer to discharge)   Barriers to discharge: Decreased caregiver support Family will not be able to assist pt at current functional level.     Assessment:     Femi Enciso is a 53 y.o. male admitted with a medical diagnosis of Acute encephalopathy.  He presents with the following impairments/functional limitations:  weakness, impaired functional mobilty, gait instability, impaired endurance, impaired balance, impaired cognition, decreased coordination, decreased lower extremity function pt blanca treatment better being able to get into bedside chair. Pt will benefit from cont skilled PT 4x/wk to progress physically. Pt will need SNF placement when medically stable. Pt is s/p liver transplant 11/11, exp lap 11/16 and re-exploration 11/18/18.    Rehab Prognosis:  good; patient would benefit from acute skilled PT services to address these deficits and reach maximum level of function.      Recent Surgery: Procedure(s) (LRB):  LAPAROTOMY, EXPLORATORY, AFTER LIVER TRANSPLANT, LIKELY  ABDOMINAL CLOSURE (N/A) 8 Days Post-Op    Plan:     During this hospitalization, patient to be seen 4 x/week to address the above listed problems via gait training, therapeutic activities, therapeutic exercises, neuromuscular re-education  · Plan of Care Expires:  12/19/18   Plan of Care Reviewed with: patient, spouse, mother    Subjective     Communicated with  nurse prior to session.  Patient found supine upon PT entry to room, agreeable to treatment.      Chief Complaint: pt stated that he felt he needed to get stronger before getting into a chair.   Patient comments/goals: to get better and go home   Pain/Comfort:  · Pain Rating 1: 0/10  · Pain Rating Post-Intervention 1: 0/10    Patients cultural, spiritual,  Anglican conflicts given the current situation: none    Objective:     Patient found with: telemetry, pulse ox (continuous), blood pressure cuff(R subclavian dialysis catheter)     General Precautions: Standard, fall   Orthopedic Precautions:N/A   Braces:       Functional Mobility:  · Bed Mobility: pt needed verbal cues for hand placement and sequencing for functional mobility.     · Rolling Right: total assistance  · Supine to Sit: total assistance  ·   · Transfers:     · Sit to Stand:  maximal assistance with no AD  · Bed to Chair: total assistance with  no AD  using  Squat Pivot  ·   · Balance: pt sat on EOB with close supervision      AM-PAC 6 CLICK MOBILITY  Turning over in bed (including adjusting bedclothes, sheets and blankets)?: 2  Sitting down on and standing up from a chair with arms (e.g., wheelchair, bedside commode, etc.): 2  Moving from lying on back to sitting on the side of the bed?: 2  Moving to and from a bed to a chair (including a wheelchair)?: 2  Need to walk in hospital room?: 1  Climbing 3-5 steps with a railing?: 1  Basic Mobility Total Score: 10       Therapeutic Activities and Exercises:   pt wife was given verbal and written instructions for [place] exer program and verbally expressed understanding of such.     Patient left supine with all lines intact, call button in reach and mother and wife present..    GOALS:   Multidisciplinary Problems     Physical Therapy Goals        Problem: Physical Therapy Goal    Goal Priority Disciplines Outcome Goal Variances Interventions   Physical Therapy Goal     PT, PT/OT Ongoing (interventions implemented as appropriate)     Description:  Goals to be met by:      Patient will increase functional independence with mobility by performin. Supine to sit with Modified [place] -not met  2. Sit to stand transfer with [place] -not met  3. Bed to chair transfer with independence using no AD -not met  4. Gait  x150 feet with  Supervision using LRAD. -not met  5. Lower extremity exercise program x20 reps per handout, with independence -not met                        Time Tracking:     PT Received On: 11/26/18  PT Start Time: 0845     PT Stop Time: 0859  PT Total Time (min): 14 min     Billable Minutes: Therapeutic Activity 14 min    Treatment Type: Other (see comments)(co-treatment with OT)  PT/PTA: PT     PTA Visit Number: 0     Nataliya Rushing, PT  11/26/2018

## 2018-11-27 PROBLEM — N18.30 ACUTE RENAL FAILURE WITH ACUTE TUBULAR NECROSIS SUPERIMPOSED ON STAGE 3 CHRONIC KIDNEY DISEASE: Status: ACTIVE | Noted: 2018-11-27

## 2018-11-27 PROBLEM — Z79.60 LONG-TERM USE OF IMMUNOSUPPRESSANT MEDICATION: Status: ACTIVE | Noted: 2018-11-27

## 2018-11-27 PROBLEM — Z91.89 AT RISK FOR OPPORTUNISTIC INFECTIONS: Status: ACTIVE | Noted: 2018-11-27

## 2018-11-27 PROBLEM — N17.0 ACUTE RENAL FAILURE WITH ACUTE TUBULAR NECROSIS SUPERIMPOSED ON STAGE 3 CHRONIC KIDNEY DISEASE: Status: ACTIVE | Noted: 2018-11-27

## 2018-11-27 PROBLEM — K65.1 INTRA-ABDOMINAL ABSCESS: Status: ACTIVE | Noted: 2018-11-27

## 2018-11-27 LAB
ALBUMIN SERPL BCP-MCNC: 1.7 G/DL
ALP SERPL-CCNC: 343 U/L
ALT SERPL W/O P-5'-P-CCNC: 64 U/L
ANION GAP SERPL CALC-SCNC: 8 MMOL/L
APTT BLDCRRT: 28.2 SEC
AST SERPL-CCNC: 49 U/L
BACTERIA SPEC AEROBE CULT: NORMAL
BASOPHILS # BLD AUTO: 0.21 K/UL
BASOPHILS NFR BLD: 1.1 %
BILIRUB SERPL-MCNC: 5.4 MG/DL
BUN SERPL-MCNC: 46 MG/DL
CALCIUM SERPL-MCNC: 7.8 MG/DL
CHLORIDE SERPL-SCNC: 107 MMOL/L
CO2 SERPL-SCNC: 22 MMOL/L
CREAT SERPL-MCNC: 2.6 MG/DL
DIFFERENTIAL METHOD: ABNORMAL
EOSINOPHIL # BLD AUTO: 1.1 K/UL
EOSINOPHIL NFR BLD: 5.4 %
ERYTHROCYTE [DISTWIDTH] IN BLOOD BY AUTOMATED COUNT: 18.9 %
EST. GFR  (AFRICAN AMERICAN): 31.2 ML/MIN/1.73 M^2
EST. GFR  (NON AFRICAN AMERICAN): 26.9 ML/MIN/1.73 M^2
GLUCOSE SERPL-MCNC: 89 MG/DL
HCT VFR BLD AUTO: 27.1 %
HGB BLD-MCNC: 8.6 G/DL
IMM GRANULOCYTES # BLD AUTO: 0.51 K/UL
IMM GRANULOCYTES NFR BLD AUTO: 2.6 %
INR PPP: 1.2
LYMPHOCYTES # BLD AUTO: 1.4 K/UL
LYMPHOCYTES NFR BLD: 7.4 %
MCH RBC QN AUTO: 29.3 PG
MCHC RBC AUTO-ENTMCNC: 31.7 G/DL
MCV RBC AUTO: 92 FL
MONOCYTES # BLD AUTO: 0.6 K/UL
MONOCYTES NFR BLD: 3.2 %
NEUTROPHILS # BLD AUTO: 15.6 K/UL
NEUTROPHILS NFR BLD: 80.3 %
NRBC BLD-RTO: 0 /100 WBC
PHOSPHATE SERPL-MCNC: 4.2 MG/DL
PLATELET # BLD AUTO: 228 K/UL
PMV BLD AUTO: 13.8 FL
POCT GLUCOSE: 199 MG/DL (ref 70–110)
POCT GLUCOSE: 203 MG/DL (ref 70–110)
POCT GLUCOSE: 87 MG/DL (ref 70–110)
POTASSIUM SERPL-SCNC: 4.5 MMOL/L
PROT SERPL-MCNC: 4 G/DL
PROTHROMBIN TIME: 12.2 SEC
RBC # BLD AUTO: 2.94 M/UL
SODIUM SERPL-SCNC: 137 MMOL/L
TACROLIMUS BLD-MCNC: 5.2 NG/ML
WBC # BLD AUTO: 19.43 K/UL

## 2018-11-27 PROCEDURE — 80197 ASSAY OF TACROLIMUS: CPT

## 2018-11-27 PROCEDURE — 99233 PR SUBSEQUENT HOSPITAL CARE,LEVL III: ICD-10-PCS | Mod: ,,, | Performed by: INTERNAL MEDICINE

## 2018-11-27 PROCEDURE — 63600175 PHARM REV CODE 636 W HCPCS: Performed by: SURGERY

## 2018-11-27 PROCEDURE — 25000003 PHARM REV CODE 250: Performed by: STUDENT IN AN ORGANIZED HEALTH CARE EDUCATION/TRAINING PROGRAM

## 2018-11-27 PROCEDURE — 99233 PR SUBSEQUENT HOSPITAL CARE,LEVL III: ICD-10-PCS | Mod: 24,,, | Performed by: NURSE PRACTITIONER

## 2018-11-27 PROCEDURE — 99233 SBSQ HOSP IP/OBS HIGH 50: CPT | Mod: 24,,, | Performed by: NURSE PRACTITIONER

## 2018-11-27 PROCEDURE — 90935 PR HEMODIALYSIS, ONE EVALUATION: ICD-10-PCS | Mod: GC,,, | Performed by: INTERNAL MEDICINE

## 2018-11-27 PROCEDURE — 63600175 PHARM REV CODE 636 W HCPCS: Performed by: NURSE PRACTITIONER

## 2018-11-27 PROCEDURE — P9047 ALBUMIN (HUMAN), 25%, 50ML: HCPCS | Mod: JG | Performed by: INTERNAL MEDICINE

## 2018-11-27 PROCEDURE — 20600001 HC STEP DOWN PRIVATE ROOM

## 2018-11-27 PROCEDURE — 63600175 PHARM REV CODE 636 W HCPCS: Performed by: STUDENT IN AN ORGANIZED HEALTH CARE EDUCATION/TRAINING PROGRAM

## 2018-11-27 PROCEDURE — 85730 THROMBOPLASTIN TIME PARTIAL: CPT

## 2018-11-27 PROCEDURE — 25000003 PHARM REV CODE 250: Performed by: SURGERY

## 2018-11-27 PROCEDURE — 63600175 PHARM REV CODE 636 W HCPCS: Mod: JG | Performed by: SURGERY

## 2018-11-27 PROCEDURE — 63600175 PHARM REV CODE 636 W HCPCS: Performed by: TRANSPLANT SURGERY

## 2018-11-27 PROCEDURE — 97530 THERAPEUTIC ACTIVITIES: CPT

## 2018-11-27 PROCEDURE — 63600175 PHARM REV CODE 636 W HCPCS: Mod: JG | Performed by: INTERNAL MEDICINE

## 2018-11-27 PROCEDURE — 80100014 HC HEMODIALYSIS 1:1

## 2018-11-27 PROCEDURE — 90935 HEMODIALYSIS ONE EVALUATION: CPT | Mod: GC,,, | Performed by: INTERNAL MEDICINE

## 2018-11-27 PROCEDURE — 25000003 PHARM REV CODE 250: Performed by: INTERNAL MEDICINE

## 2018-11-27 PROCEDURE — 99233 SBSQ HOSP IP/OBS HIGH 50: CPT | Mod: ,,, | Performed by: INTERNAL MEDICINE

## 2018-11-27 PROCEDURE — 85610 PROTHROMBIN TIME: CPT

## 2018-11-27 PROCEDURE — 99232 SBSQ HOSP IP/OBS MODERATE 35: CPT | Mod: ,,, | Performed by: NURSE PRACTITIONER

## 2018-11-27 PROCEDURE — 85025 COMPLETE CBC W/AUTO DIFF WBC: CPT

## 2018-11-27 PROCEDURE — 80053 COMPREHEN METABOLIC PANEL: CPT

## 2018-11-27 PROCEDURE — 99232 PR SUBSEQUENT HOSPITAL CARE,LEVL II: ICD-10-PCS | Mod: ,,, | Performed by: NURSE PRACTITIONER

## 2018-11-27 PROCEDURE — 84100 ASSAY OF PHOSPHORUS: CPT

## 2018-11-27 RX ORDER — GLUCAGON 1 MG
1 KIT INJECTION
Status: DISCONTINUED | OUTPATIENT
Start: 2018-11-27 | End: 2018-12-18

## 2018-11-27 RX ORDER — IBUPROFEN 200 MG
24 TABLET ORAL
Status: DISCONTINUED | OUTPATIENT
Start: 2018-11-27 | End: 2018-12-18

## 2018-11-27 RX ORDER — INSULIN ASPART 100 [IU]/ML
0-5 INJECTION, SOLUTION INTRAVENOUS; SUBCUTANEOUS
Status: DISCONTINUED | OUTPATIENT
Start: 2018-11-27 | End: 2018-12-18

## 2018-11-27 RX ORDER — TACROLIMUS 1 MG/1
3 CAPSULE ORAL 2 TIMES DAILY
Status: DISCONTINUED | OUTPATIENT
Start: 2018-11-27 | End: 2018-11-28

## 2018-11-27 RX ORDER — IBUPROFEN 200 MG
16 TABLET ORAL
Status: DISCONTINUED | OUTPATIENT
Start: 2018-11-27 | End: 2018-12-18

## 2018-11-27 RX ORDER — ONDANSETRON 2 MG/ML
4 INJECTION INTRAMUSCULAR; INTRAVENOUS EVERY 6 HOURS PRN
Status: DISCONTINUED | OUTPATIENT
Start: 2018-11-27 | End: 2019-01-08 | Stop reason: HOSPADM

## 2018-11-27 RX ORDER — SODIUM CHLORIDE 9 MG/ML
INJECTION, SOLUTION INTRAVENOUS ONCE
Status: DISCONTINUED | OUTPATIENT
Start: 2018-11-27 | End: 2018-11-30

## 2018-11-27 RX ORDER — ALBUMIN HUMAN 250 G/1000ML
25 SOLUTION INTRAVENOUS ONCE
Status: COMPLETED | OUTPATIENT
Start: 2018-11-27 | End: 2018-11-27

## 2018-11-27 RX ORDER — SODIUM CHLORIDE 9 MG/ML
INJECTION, SOLUTION INTRAVENOUS
Status: DISCONTINUED | OUTPATIENT
Start: 2018-11-27 | End: 2018-11-29

## 2018-11-27 RX ADMIN — OXYCODONE HYDROCHLORIDE 5 MG: 5 TABLET ORAL at 11:11

## 2018-11-27 RX ADMIN — DAPTOMYCIN 980 MG: 500 INJECTION, POWDER, LYOPHILIZED, FOR SOLUTION INTRAVENOUS at 11:11

## 2018-11-27 RX ADMIN — FAMOTIDINE 20 MG: 20 TABLET ORAL at 08:11

## 2018-11-27 RX ADMIN — URSODIOL 300 MG: 300 CAPSULE ORAL at 08:11

## 2018-11-27 RX ADMIN — HEPARIN SODIUM 5000 UNITS: 5000 INJECTION, SOLUTION INTRAVENOUS; SUBCUTANEOUS at 08:11

## 2018-11-27 RX ADMIN — METRONIDAZOLE 500 MG: 500 TABLET ORAL at 03:11

## 2018-11-27 RX ADMIN — QUETIAPINE FUMARATE 50 MG: 25 TABLET, FILM COATED ORAL at 08:11

## 2018-11-27 RX ADMIN — CEFEPIME 1 G: 1 INJECTION, POWDER, FOR SOLUTION INTRAMUSCULAR; INTRAVENOUS at 11:11

## 2018-11-27 RX ADMIN — HEPARIN SODIUM 5000 UNITS: 5000 INJECTION, SOLUTION INTRAVENOUS; SUBCUTANEOUS at 05:11

## 2018-11-27 RX ADMIN — PREDNISONE 15 MG: 5 TABLET ORAL at 08:11

## 2018-11-27 RX ADMIN — ISAVUCONAZONIUM SULFATE 372 MG: 74.4 INJECTION, POWDER, LYOPHILIZED, FOR SOLUTION INTRAVENOUS at 08:11

## 2018-11-27 RX ADMIN — HEPARIN SODIUM 5000 UNITS: 5000 INJECTION, SOLUTION INTRAVENOUS; SUBCUTANEOUS at 02:11

## 2018-11-27 RX ADMIN — METRONIDAZOLE 500 MG: 500 TABLET ORAL at 08:11

## 2018-11-27 RX ADMIN — METRONIDAZOLE 500 MG: 500 TABLET ORAL at 05:11

## 2018-11-27 RX ADMIN — TACROLIMUS 3 MG: 1 CAPSULE ORAL at 06:11

## 2018-11-27 RX ADMIN — ONDANSETRON 4 MG: 2 INJECTION INTRAMUSCULAR; INTRAVENOUS at 09:11

## 2018-11-27 RX ADMIN — CEFEPIME 1 G: 1 INJECTION, POWDER, FOR SOLUTION INTRAMUSCULAR; INTRAVENOUS at 08:11

## 2018-11-27 RX ADMIN — ALBUMIN HUMAN 25 G: 0.25 SOLUTION INTRAVENOUS at 01:11

## 2018-11-27 RX ADMIN — TACROLIMUS 2 MG: 1 CAPSULE ORAL at 08:11

## 2018-11-27 NOTE — PT/OT/SLP PROGRESS
"Physical Therapy Treatment    Patient Name:  Femi Enciso   MRN:  [id number]    Recommendations:     Discharge Recommendations:  nursing facility, skilled   Discharge Equipment Recommendations: (TBD)   Barriers to discharge: Decreased caregiver support at current functional level     Assessment:     Femi Enciso is a 53 y.o. male admitted with a medical diagnosis of S/P liver transplant.  He presents with the following impairments/functional limitations:  weakness, gait instability, impaired endurance, impaired balance, impaired self care skills, impaired functional mobilty. Activity tolerance limited this date 2* pt c/o nausea and weakness. Attempted sit<>stand x1 trial but unable to achieve standing. Max encouragement and education on the importance of activity provided. Pt would continue to benefit from skilled acute PT in order to address current deficits and progress functional mobility.     Rehab Prognosis:  good; patient would benefit from acute skilled PT services to address these deficits and reach maximum level of function.      Recent Surgery: Procedure(s) (LRB):  LAPAROTOMY, EXPLORATORY, AFTER LIVER TRANSPLANT, LIKELY  ABDOMINAL CLOSURE (N/A) 9 Days Post-Op    Plan:     During this hospitalization, patient to be seen 4 x/week to address the above listed problems via gait training, therapeutic activities, therapeutic exercises, neuromuscular re-education  · Plan of Care Expires:  12/19/18   Plan of Care Reviewed with: patient    Subjective     Communicated with RN prior to session.  Patient found UIC upon PT entry to room, agreeable to treatment.      Chief Complaint: nausea and weakness   Patient comments/goals: "Nobody wants out of this place more than I do." "I want to do it but I feel like I'm not being heard."   Pain/Comfort:  · Pain Rating 1: (reported nausea)    Patients cultural, spiritual, Oriental orthodox conflicts given the current situation: none noted     Objective:     Patient found with: JOSE A drain, " peripheral IV, telemetry, pulse ox (continuous)     General Precautions: Standard, fall, contact   Orthopedic Precautions:N/A   Braces: N/A     Functional Mobility:  · Transfers:     · Sit to Stand:  attempted x1 trial with maxAx2 but poor mechanics and unable to achieve full stand; pt declined further attempts despite max encouragement  · Seated Scooting (forward and backward in bedside chair): totalA        AM-PAC 6 CLICK MOBILITY  Turning over in bed (including adjusting bedclothes, sheets and blankets)?: 2  Sitting down on and standing up from a chair with arms (e.g., wheelchair, bedside commode, etc.): 2  Moving from lying on back to sitting on the side of the bed?: 2  Moving to and from a bed to a chair (including a wheelchair)?: 2  Need to walk in hospital room?: 1  Climbing 3-5 steps with a railing?: 1  Basic Mobility Total Score: 10       Therapeutic Activities and Exercises:   Max encouragement and education provided re: the importance of participation in therapy and progressing functional mobility. Initially pt giving effort and somewhat participating in mobility, but then declined further mobility; pt stating that he feels ill, and feels like he is being pushed and not being heard. Again explained the reasoning for encouragement and the importance of therapy, but pt continued to decline further therapy at this time.     Patient left up in chair with all lines intact, call button in reach and family present..    GOALS:   Multidisciplinary Problems     Physical Therapy Goals        Problem: Physical Therapy Goal    Goal Priority Disciplines Outcome Goal Variances Interventions   Physical Therapy Goal     PT, PT/OT Ongoing (interventions implemented as appropriate)     Description:  Goals to be met by:      Patient will increase functional independence with mobility by performin. Supine to sit with Modified [place] -not met  2. Sit to stand transfer with [place] -not met  3. Bed to  chair transfer with independence using no AD -not met  4. Gait  x150 feet with Supervision using LRAD. -not met  5. Lower extremity exercise program x20 reps per handout, with independence -not met               Problem: Physical Therapy Goal    Goal Priority Disciplines Outcome Goal Variances Interventions   Physical Therapy Goal     PT, PT/OT                      Time Tracking:     PT Received On: 11/27/18  PT Start Time: 0925     PT Stop Time: 0945  PT Total Time (min): 20 min     Billable Minutes: Therapeutic Activity 10 (split with OT)    Treatment Type: Treatment  PT/PTA: PT     PTA Visit Number: 0     Ramandeep Kumar, PT, DPT   11/27/2018  [phone]

## 2018-11-27 NOTE — PLAN OF CARE
Problem: Occupational Therapy Goal  Goal: Occupational Therapy Goal  Goals to be met by: 12/5/18     Patient will increase functional independence with ADLs by performing:    UE Dressing with Supervision.  LE Dressing with Minimal Assistance.  Grooming while standing at sink with Stand-by Assistance.  Toileting from toilet with Minimal Assistance for hygiene and clothing management.   Toilet transfer to toilet with Stand-by Assistance.  Upper extremity  theraband exercise program x  15 reps  with independence.       Outcome: Ongoing (interventions implemented as appropriate)  Therapy session limited this date 2/2 pt fatigue/declind further mobility. Continue with POC.   Julia ladd OT  11/27/2018

## 2018-11-27 NOTE — PT/OT/SLP PROGRESS
Speech Language Pathology  Pt not seen    Femi Enciso  MRN: [id number]    Patient not seen today secondary to pt with c/o nausea upon attempt and declined PO trials. SLP will follow up.    So [place] CCC-SLP   11/27/2018

## 2018-11-27 NOTE — PROGRESS NOTES
[facility]  Nephrology  Progress Note    Patient Name: Femi Enicso  MRN: [id number]  Admission Date: 10/27/2018  Hospital Length of Stay: 31 days  Attending Provider: Femi Patel MD   Primary Care Physician: Primary Doctor No  Principal Problem:S/P liver transplant    Subjective:     HPI: 52 y/o man with DM2 presents to the ED with family for liver failure (likely due to EtOH abundant history of drinking - diagnosed in Sept 2018 in [place]).  He reports jaundice, generalized weakness, nausea, diarrhea, and decreased appetite since Sept 2018.  He is from [place], [place], and Dr. Sharma (patients physician) recommended bringing him to hospital for evaluation.  Patient denies any fever, chills, vomiting, chest pain, palpitations, SOB, abdominal pain.      Nephrology consulted for evaluation/management Adri.     Interval History: feeling nauseous this morning, increasing LE edema    Review of patient's allergies indicates:   Allergen Reactions    Penicillins Nausea And Vomiting and Rash     Full body rash from cefepime as well     Current Facility-Administered Medications   Medication Frequency    0.9%  NaCl infusion PRN    0.9%  NaCl infusion Once    albuterol-ipratropium 2.5 mg-0.5 mg/3 mL nebulizer solution 3 mL Q4H PRN    ceFEPIme injection 1 g Q12H    DAPTOmycin (CUBICIN) 980 mg in sodium chloride 0.9% IVPB Q24H    dextrose 50% injection 12.5 g PRN    dextrose 50% injection 25 g PRN    famotidine tablet 20 mg QHS    glucagon (human recombinant) injection 1 mg PRN    glucose chewable tablet 16 g PRN    glucose chewable tablet 24 g PRN    heparin (porcine) injection 1,000 Units PRN    heparin (porcine) injection 5,000 Units Q8H    HYDROmorphone injection 0.2 mg Q3H PRN    insulin aspart U-100 pen 0-5 Units QID (AC + HS) PRN    [START ON 11/28/2018] isavuconazonium sulfate Cap 372 mg Daily    metroNIDAZOLE tablet 500 mg Q8H    ondansetron injection 4 mg Q6H PRN    oxyCODONE immediate  release tablet 5 mg Q4H PRN    oxyCODONE immediate release tablet Tab 10 mg Q4H PRN    predniSONE tablet 15 mg Daily    Followed by    [START ON 12/3/2018] predniSONE tablet 10 mg Daily    Followed by    [START ON 12/10/2018] predniSONE tablet 5 mg Daily    Followed by    [START ON 12/17/2018] predniSONE tablet 2.5 mg Daily    QUEtiapine tablet 50 mg QHS    ramelteon tablet 8 mg Nightly PRN    sulfamethoxazole-trimethoprim 400-80mg per tablet 1 tablet Twice Weekly    tacrolimus capsule 3 mg BID    ursodiol capsule 300 mg BID    [START ON 11/28/2018] valganciclovir 50 mg/ml oral solution 100 mg Once per day on Mon Wed Fri       Objective:     Vital Signs (Most Recent):  Temp: 98 °F (36.7 °C) (11/27/18 1121)  Pulse: 99 (11/27/18 1445)  Resp: (!) 23 (11/27/18 1445)  BP: 113/81 (11/27/18 1445)  SpO2: 100 % (11/27/18 1445)  O2 Device (Oxygen Therapy): room air (11/26/18 1120) Vital Signs (24h Range):  Temp:  [97.6 °F (36.4 °C)-98.2 °F (36.8 °C)] 98 °F (36.7 °C)  Pulse:  [] 99  Resp:  [17-24] 23  SpO2:  [95 %-100 %] 100 %  BP: ()/(69-87) 113/81     Weight: 86 kg (189 lb 9.5 oz) (11/27/18 0500)  Body mass index is 27.2 kg/m².  Body surface area is 2.06 meters squared.    I/O last 3 completed shifts:  In: 1620 [P.O.:1320; IV Piggyback:300]  Out: 4350 [Drains:4350]    Physical Exam   HENT:   Head: Atraumatic.   Neck: No JVD present.   Cardiovascular: Normal rate and regular rhythm. Exam reveals no friction rub.   Pulmonary/Chest: Effort normal and breath sounds normal.   Abdominal: Soft. He exhibits distension. There is no tenderness.   Musculoskeletal: He exhibits edema.   Neurological: He is alert.   Skin: Skin is warm.   Psychiatric: He has a normal mood and affect.           Assessment/Plan:     DEL (acute kidney injury)    DEL oliguric with unknown baseline sCr, most likely suspect iATN multifactorial from ischemia due to hypotension/volume depletion ( high output diarrhea) and possible pigmented  nephropathy in setting of very high BB 39-40 and component of HRS physiology.   - s/p OHLTx 11/11/2018; intra-op SLED  - remaining anuric, but clearance numbers look ok, on SLED overnight  -hemodynamically stable, off pressors    -made about 25-30ccs of urine overnight, also attempted straight cath yesterday    -becoming more edematous today      Plan:  -RRT/HD today for volume removal and metabolic clearance             Thank you for your consult. I will follow-up with patient. Please contact us if you have any additional questions.    Petar Hoyos MD  Nephrology  [facility]

## 2018-11-27 NOTE — PROGRESS NOTES
[facility]  Liver Transplant  Progress Note    Patient Name: Femi Enciso  MRN: [id number]  Admission Date: 10/27/2018  Hospital Length of Stay: 31 days  Code Status: Full Code  Primary Care Provider: Primary Doctor No  Post-Operative Day: 16    ORGAN:   LIVER  Disease Etiology: Alcoholic Cirrhosis  Donor Type:    - Brain Death  CDC High Risk:   No  Donor CMV Status:   Donor CMV Status: Positive  Donor HBcAB:   Negative  Donor HCV Status:   Negative  Donor HBV JAVIER: Negative  Donor HCV JAVIER: Negative  Whole or Partial: Whole Liver  Biliary Anastomosis: End to End  Arterial Anatomy: Standard  Subjective:     History of Present Illness:  Femi Enciso is a 53yr old male with PMH of acute ETOH hepatitis and DEL/ATN evolved to ESRD requiring HD (not candidate for KTX). He is now s/p liver transplant (SM induction, DBD, CMV D+/R-). Transplant surgery noted severe collateralization, adrenal gland firmly adhered to liver. Bare area of adrenal gland required several stitches and packing to obtain fair hemostasis (EBL 15L). OR take back  for bleeding from R adrenal bed in AM (significant clot in retroperitoneum, posterior to R hepatic lobe, tract posterior to the R kidney containing significant clot, small bleeding area of retroperitoneal fat) then returned to surgery same day for hemorrhaging closed with wound vac with plans to return to OR 24-48 hours for closure (retroperitoneal /retrorenal/retrocolic hematoma on the right . Raw retroperitoneal bleeding. Suture ligatures placed, argon beam cautery, evarest placed in retroperitoneum behind cava and right kidney. Significant oozing from upper right adrenal gland, not amenable to ligation, that portion of the adrenal gland was resected with cautery). OR take back  for washout and incisional closure, no significant bleeding or hematoma found. OR cultures   from body fluid grew enterococcus faecium VRE. ID was consulted,  started on  daptomycin for VRE treatment and cefepime/flagyl to cover for IA ty in bile. Patient with significant leukocytosis with peak on 11/22 at 48K prompting drainage of R subphrenic fluid collection, perc drain placed, culture grew VRE. Stroke code called 11/21 for lethargy and unresponsive, CT head without evidence of acute ischemic changes and CTA chest negative, vascular neurology was consulted recommended MRI brain, but patient's AMS improved shortly after event. Nephrology consulted for ESRD requiring HD. Patient overall improved on antibiotic regimen, leukocytosis and AMS improved. He was transferred to [facility] on POD #15.     Hospital Course:  Interval History:  No acute events overnight. Pt reports feeling well this am and more alert today. LFTs slowly improving. Pt remains with 2 drains in place. Cont with antibxs as per ID recs at this time. Will plan to repeat abdominal CT scan today to further assess fluid collections. Nephrology also consulted as pt remains HD dependent. Will plan for HD later today. Encourage ambulation as pt remains significantly debilitated. Monitor.       Scheduled Meds:   sodium chloride 0.9%   Intravenous Once    albumin human 25%  25 g Intravenous Once    ceFEPime (MAXIPIME) IVPB  1 g Intravenous Q12H    DAPTOmycin (CUBICIN)  IV  10 mg/kg Intravenous Q24H    famotidine  20 mg Oral QHS    heparin (porcine)  5,000 Units Subcutaneous Q8H    custom IVPB builder  372 mg Intravenous Q24H    metroNIDAZOLE  500 mg Oral Q8H    predniSONE  15 mg Oral Daily    Followed by    [START ON 12/3/2018] predniSONE  10 mg Oral Daily    Followed by    [START ON 12/10/2018] predniSONE  5 mg Oral Daily    Followed by    [START ON 12/17/2018] predniSONE  2.5 mg Oral Daily    QUEtiapine  50 mg Oral QHS    sulfamethoxazole-trimethoprim 400-80mg  1 tablet Oral Twice Weekly    tacrolimus  3 mg Sublingual BID    ursodiol  300 mg Oral BID    [START ON 11/28/2018] valganciclovir 50 mg/ml  100 mg Oral  Once per day on Mon Wed Fri     Continuous Infusions:  PRN Meds:sodium chloride 0.9%, albuterol-ipratropium, dextrose 50%, dextrose 50%, glucagon (human recombinant), glucose, glucose, heparin (porcine), HYDROmorphone, insulin aspart U-100, ondansetron, oxyCODONE, oxyCODONE, ramelteon    Review of Systems   Constitutional: Positive for activity change, appetite change and fatigue. Negative for fever.   HENT: Negative.  Negative for facial swelling.    Eyes: Negative.    Respiratory: Negative.  Negative for apnea, chest tightness, shortness of breath and wheezing.    Cardiovascular: Negative.  Negative for chest pain, palpitations and leg swelling.   Gastrointestinal: Positive for abdominal distention and abdominal pain. Negative for constipation, diarrhea, nausea and vomiting.   Genitourinary: Negative.  Negative for decreased urine volume, difficulty urinating, dysuria, hematuria and urgency.   Musculoskeletal: Negative.  Negative for back pain, gait problem, neck pain and neck stiffness.   Skin: Positive for wound. Negative for color change and pallor.   Allergic/Immunologic: Positive for immunocompromised state.   Neurological: Positive for weakness. Negative for dizziness, seizures, light-headedness and headaches.   Psychiatric/Behavioral: Negative.  Negative for behavioral problems, confusion, hallucinations, sleep disturbance and suicidal ideas. The patient is not nervous/anxious.      Objective:     Vital Signs (Most Recent):  Temp: 98 °F (36.7 °C) (11/27/18 1121)  Pulse: 103 (11/27/18 1145)  Resp: (!) 22 (11/27/18 1145)  BP: 107/77 (11/27/18 1145)  SpO2: 100 % (11/27/18 1145) Vital Signs (24h Range):  Temp:  [97.6 °F (36.4 °C)-98.2 °F (36.8 °C)] 98 °F (36.7 °C)  Pulse:  [] 103  Resp:  [17-22] 22  SpO2:  [95 %-100 %] 100 %  BP: (103-119)/(69-82) 107/77     Weight: 86 kg (189 lb 9.5 oz)  Body mass index is 27.2 kg/m².    Intake/Output - Last 3 Shifts       11/25 0700 - 11/26 0659 11/26 0700 - 11/27 0659  11/27 0700 - 11/28 0659    P.O. 1540 600     I.V. (mL/kg) 533 (5.7)      Other 450      IV Piggyback  300     Total Intake(mL/kg) 2523 (27.1) 900 (10.5)     Urine (mL/kg/hr) 0 (0) 0 (0)     Emesis/NG output  0     Drains 4310 2280 260    Other 2000 0     Stool 0 0     Blood  0     Total Output 6310 2280 260    Net -3787 -1380 -260           Urine Occurrence 1 x 0 x     Stool Occurrence 2 x 1 x     Emesis Occurrence  0 x           Physical Exam   Constitutional: He is oriented to person, place, and time. He appears well-developed. No distress.   HENT:   Head: Normocephalic and atraumatic.   Neck: Normal range of motion. Neck supple. No JVD present.   Cardiovascular: Normal rate, regular rhythm and normal heart sounds.   No murmur heard.  Pulmonary/Chest: Effort normal. No respiratory distress. He has decreased breath sounds in the right lower field and the left lower field. He has no wheezes. He exhibits no tenderness.   Abdominal: Soft. Bowel sounds are normal. He exhibits no distension. There is tenderness.   Chevron inc ECTOR w/ staples  RLQ JOSE A drain and percutaneous drain present.    Musculoskeletal: Normal range of motion. He exhibits edema. He exhibits no tenderness.   Neurological: He is alert and oriented to person, place, and time. He has normal reflexes.   Skin: Skin is warm and dry. He is not diaphoretic.   Psychiatric: He has a normal mood and affect. His behavior is normal. Judgment and thought content normal.   Nursing note and vitals reviewed.      Laboratory:  Immunosuppressants         Stop Route Frequency     tacrolimus capsule 3 mg      -- SL 2 times daily        CBC:   Recent Labs   Lab 11/27/18 0612   WBC 19.43*   RBC 2.94*   HGB 8.6*   HCT 27.1*      MCV 92   MCH 29.3   MCHC 31.7*     CMP:   Recent Labs   Lab 11/27/18 0612   GLU 89   CALCIUM 7.8*   ALBUMIN 1.7*   PROT 4.0*      K 4.5   CO2 22*      BUN 46*   CREATININE 2.6*   ALKPHOS 343*   ALT 64*   AST 49*   BILITOT 5.4*      Coagulation:   Recent Labs   Lab 11/27/18  0612   INR 1.2   APTT 28.2     Labs within the past 24 hours have been reviewed.    Diagnostic Results:  None    Assessment/Plan:     * S/P liver transplant    53 year old male with history of ESLD 2/2 ETOH cirrhosis who is s/p liver transplant c/b take-back x 3 for bleeding most recently 11/18.  - LFTs now improving slowly.   - Pt remains with significant debility. Pt will need SNF placement when medically stable.   - Appetite now improving and encourage PO intake.   - Recently with intra-abdominal abscess requiring drain placement on 11/22.   - Fluid from collection noted with enterococcus VRE. Cont with antibxs.   - Pain well controlled, and having BMs.    - HD per nephrology. Last HD on 11/25.        Leucocytosis    - See intra-abdominal abscess.        Intra-abdominal abscess    - Significant increase in WBC post-op.   - OR cultures from 11/18 noted with enterococcus VRE.   - Abdominal CT performed at that time with fluid collection and drain placed on 11/22 for drainage.   - Intra-abdominal abscess culture also with enterococcus VRE.   - OD consulted and pt placed on antibxs for treatment. Cont with Cefepime, Daptomycin, and Fluconazole for treatment.   - Pt remains with RLQ drains (one JOSE A and one Percutaneous).   - WBC now improving daily. Will cont to f/u on final antibxs recs from ID.        Weakness    - Pt remains significantly debilitated.   - PT/OT for strengthening.   - Currently will need SNF for placement and further rehabilitation when medically stable.        Acute renal failure with acute tubular necrosis superimposed on stage 3 chronic kidney disease    - Renal function slow to recover post-op.   - Nephrology consulted for management.   - Cont with HD as per nephrology recs.   - Last HD on 11/25. Plan for HD today.   - Pt remains with little UOP and rising Cr level.        Anemia of chronic disease    - H&H stable. Monitor.        At risk for  opportunistic infections    - Cont with bactrim and valcyte for protection against opportunistic infections.      Delirium    - Pt with intermittent confusion since transplant but has now improved slowly.   - AAOx3. More alert and awake on 11/27.   - Cont with Seroquel nightly   - Monitor closely.   - Of note, pt with some lethargy and confusion on 11/21. Head CT negative.      Long-term use of immunosuppressant medication    - Cont with prograf. Draw prograf level daily and adjust dose as needed to maintain a therapeutic level.        Prophylactic immunotherapy    - See long term immunosuppression.        Type 2 diabetes mellitus without complication    - Endocrine consulted for management. Appreciate recs.            VTE Risk Mitigation (From admission, onward)        Ordered     heparin (porcine) injection 1,000 Units  As needed (PRN)      11/25/18 1428     heparin (porcine) injection 5,000 Units  Every 8 hours      11/11/18 1208     IP VTE HIGH RISK PATIENT  Once      11/11/18 1208          The patients clinical status was discussed at multidisplinary rounds, involving transplant surgery, transplant medicine, pharmacy, nursing, nutrition, and social work    Discharge Planning:  Not a candidate for d/c at this time.     Kevin Miranda, NP  Liver Transplant  [facility]

## 2018-11-27 NOTE — PROGRESS NOTES
[facility]  Infectious Disease  Progress Note    Patient Name: Femi Enciso  MRN: [id number]  Admission Date: 10/27/2018  Length of Stay: 31 days  Attending Physician: Femi Patel MD  Primary Care Provider: Primary Doctor No    Isolation Status: Contact  Assessment/Plan:      Delirium    54yo man w/a history of DM2 and alcohol dependence with associated hepatitis who was admitted on 10/27/2018 with acute liver failure (c/b PSE, HRS, hepatic hydrothorax) and ultimately underwent liver transplantation (s/p DBDLT 11/11/2018, CMV D+/R-, steroid induction, on maintenance tacro/MMF/pred; c/b post-operative delirium, VRE bacteruria, and IA hemorrhage requiring re-do ex-lap, RP/retrorenal/retrocolic hematoma evacuation, cauterization of RP bleed, partial right adrenalectomy, and temporary vac abdominal closure on 11/16/2018 and planned benign second look washout on 11/18/2018). ID was consulted on 11/18 for VRE.faecium infected IA hematoma (with growth from washout cultures). His AMS, leukocytosis, and bilirubin have improved with expanded coverage of dapto/cefepime/flagyl and additional percutaneous drain insertion on 11/22.     - would continue daptomycin as VRE isolate is sensitive and this agent will be better tolerated long term (no expected significant myelosuppression as with linezolid); weekly CPK needed and contact precautions ordered  - would continue empiric cefepime/flagyl for coverage of IA ty in bile  - would continue posaconazole for mold prophylaxis per institutional protocol (risk factor: hemorrhage/washout)  - remainder of prophylaxis per protocol  - will leave final antibiotics recs tomorrow based on results of CT A/P         Anticipated Disposition: pending improvement    Thank you for your consult. I will follow-up with patient. Please contact us if you have any additional questions.     Rosalee Ireland MD  Transplant ID Attending  [id number]    Rosalee Ireland MD  Infectious  Disease  [facility]    Subjective:     Principal Problem:S/P liver transplant    HPI: No notes on file  Interval History: Doing well today. Afebrile. Drain output slowing. WBC and bilirubin continue to improve. CT A/P pending to reassess fluid collection to finalize antibiotics recs.    Review of Systems   Constitutional: Negative for activity change, appetite change, chills, fatigue and fever.   HENT: Negative for congestion, ear pain, rhinorrhea and sore throat.    Eyes: Negative for pain, discharge and visual disturbance.   Respiratory: Negative for cough, chest tightness and shortness of breath.    Cardiovascular: Negative for chest pain and palpitations.   Gastrointestinal: Negative for abdominal distention, abdominal pain, blood in stool, constipation, diarrhea, nausea and vomiting.   Genitourinary: Negative for difficulty urinating.   Musculoskeletal: Negative for back pain and neck pain.   Skin: Negative for color change and rash.   Neurological: Negative for dizziness, numbness and headaches.   Hematological: Negative for adenopathy.   Psychiatric/Behavioral: Negative for behavioral problems and confusion.   All other systems reviewed and are negative.    Objective:     Vital Signs (Most Recent):  Temp: 98 °F (36.7 °C) (11/27/18 1121)  Pulse: 99 (11/27/18 1445)  Resp: (!) 23 (11/27/18 1445)  BP: 113/81 (11/27/18 1445)  SpO2: 100 % (11/27/18 1445) Vital Signs (24h Range):  Temp:  [98 °F (36.7 °C)-98.2 °F (36.8 °C)] 98 °F (36.7 °C)  Pulse:  [] 99  Resp:  [17-24] 23  SpO2:  [95 %-100 %] 100 %  BP: ()/(71-87) 113/81     Weight: 86 kg (189 lb 9.5 oz)  Body mass index is 27.2 kg/m².    Estimated Creatinine Clearance: 33.9 mL/min (A) (based on SCr of 2.6 mg/dL (H)).    Physical Exam   Constitutional: He appears well-developed. No distress.   HENT:   Head: Atraumatic.   Mouth/Throat: Oropharynx is clear and moist. No oropharyngeal exudate.   Eyes: Conjunctivae and EOM are normal.  Pupils are equal, round, and reactive to light. No scleral icterus.   Neck: Neck supple.   Cardiovascular: Normal rate and regular rhythm. Exam reveals no friction rub.   No murmur heard.  Pulmonary/Chest: Breath sounds normal. No respiratory distress. He has no wheezes. He has no rales. He exhibits no tenderness.   Abdominal: Soft. Bowel sounds are normal. He exhibits distension. There is no tenderness. There is no rebound and no guarding.   SS fluid in both drains.   Musculoskeletal: Normal range of motion. He exhibits edema.   Lymphadenopathy:     He has no cervical adenopathy.   Neurological: He is alert.   Skin: No rash noted. No erythema.       Significant Labs:   CBC:   Recent Labs   Lab 11/26/18  0315 11/27/18  0612   WBC 25.37* 19.43*   HGB 9.1* 8.6*   HCT 28.0* 27.1*    228     CMP:   Recent Labs   Lab 11/25/18  2200 11/26/18  0315 11/27/18  0612     140 141  141 137   K 4.4  4.4 4.2  4.2 4.5     108 110  110 107   CO2 20*  20* 20*  20* 22*   *  150* 163*  163* 89   BUN 29*  29* 33*  33* 46*   CREATININE 1.7*  1.7* 1.7*  1.7* 2.6*   CALCIUM 7.6*  7.6* 7.4*  7.4* 7.8*   PROT  --  3.8* 4.0*   ALBUMIN 1.7*  1.7* 1.6*  1.6* 1.7*   BILITOT  --  6.4* 5.4*   ALKPHOS  --  353* 343*   AST  --  58* 49*   ALT  --  74* 64*   ANIONGAP 12  12 11  11 8   EGFRNONAA 45.0*  45.0* 45.0*  45.0* 26.9*       Significant Imaging: I have reviewed all pertinent imaging results/findings within the past 24 hours.

## 2018-11-27 NOTE — PROGRESS NOTES
Instructed patient's wife on how to paint incision with betadine (3 x a day). Patient's wife to paint incision with nursing assistance tonight. Patient's wife aware that the caregiver is responsible for painting incision when nursing verifies proper technique. Will continue to monitor.

## 2018-11-27 NOTE — ASSESSMENT & PLAN NOTE
- Cont with prograf. Draw prograf level daily and adjust dose as needed to maintain a therapeutic level.

## 2018-11-27 NOTE — ASSESSMENT & PLAN NOTE
- Significant increase in WBC post-op.   - OR cultures from 11/18 noted with enterococcus VRE.   - Abdominal CT performed at that time with fluid collection and drain placed on 11/22 for drainage.   - Intra-abdominal abscess culture also with enterococcus VRE.   - ID consulted and pt placed on antibxs for treatment. Cont with Cefepime, Daptomycin, and Fluconazole for treatment.   - Pt remains with RLQ drains (one JOSE A and one Percutaneous).   - WBC now improving daily. Will cont to f/u on final antibxs recs from ID.   - Liver u/s on 11/26 reviewed.   - Plan for an abdominal CT today to further assess previously seen collections.

## 2018-11-27 NOTE — ASSESSMENT & PLAN NOTE
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   - s/p OHLTx 11/11/2018; intra-op SLED  - remaining anuric, but clearance numbers look ok, on SLED overnight  -hemodynamically stable, off pressors    -made about 25-30ccs of urine overnight, also attempted straight cath yesterday    -becoming more edematous today      Plan:  -RRT/HD today for volume removal and metabolic clearance

## 2018-11-27 NOTE — ASSESSMENT & PLAN NOTE
52yo man w/a history of DM2 and alcohol dependence with associated hepatitis who was admitted on 10/27/2018 with acute liver failure (c/b PSE, HRS, hepatic hydrothorax) and ultimately underwent liver transplantation (s/p DBDLT 11/11/2018, CMV D+/R-, steroid induction, on maintenance tacro/MMF/pred; c/b post-operative delirium, VRE bacteruria, and IA hemorrhage requiring re-do ex-lap, RP/retrorenal/retrocolic hematoma evacuation, cauterization of RP bleed, partial right adrenalectomy, and temporary vac abdominal closure on 11/16/2018 and planned benign second look washout on 11/18/2018). ID was consulted on 11/18 for VRE.faecium infected IA hematoma (with growth from washout cultures). His AMS, leukocytosis, and bilirubin have improved with expanded coverage of dapto/cefepime/flagyl and additional percutaneous drain insertion on 11/22.     - would continue daptomycin as VRE isolate is sensitive and this agent will be better tolerated long term (no expected significant myelosuppression as with linezolid); weekly CPK needed and contact precautions ordered  - would continue empiric cefepime/flagyl for coverage of IA ty in bile  - would continue posaconazole for mold prophylaxis per institutional protocol (risk factor: hemorrhage/washout)  - remainder of prophylaxis per protocol  - will leave final antibiotics recs tomorrow based on results of CT A/P

## 2018-11-27 NOTE — ASSESSMENT & PLAN NOTE
- Pt with intermittent confusion since transplant but has now improved slowly.   - AAOx3. More alert and awake on 11/27.   - Cont with Seroquel nightly   - Monitor closely.   - Of note, pt with some lethargy and confusion on 11/21. Head CT negative.

## 2018-11-27 NOTE — PROGRESS NOTES
Hemodialysis x 3 hours complete. 1.7 liter net fluid removal. Albumin 25g/ 100ml given x 1. Blood returned without difficulty. Each port of cvc flushed with normal saline and capped.

## 2018-11-27 NOTE — PT/OT/SLP PROGRESS
"Occupational Therapy   Treatment    Name: Femi Enciso  MRN: [id number]  Admitting Diagnosis:  S/P liver transplant  9 Days Post-Op    Recommendations:     Discharge Recommendations: nursing facility, skilled  Discharge Equipment Recommendations:  (TBD- pending progress)  Barriers to discharge:  None    Subjective     Pain/Comfort:  · Pain Rating 1: 0/10(complaints of nausea)    " Ever since they got me in this chair I have had a heavy head and felt nauseous"     Objective:     Communicated with: RN prior to session.  Patient found with all lines intact, call button in reach and Rn notified and telemetry, pulse ox (continuous), peripheral IV, JOSE A drain upon OT entry to room.    General Precautions: Standard, contact, fall   Orthopedic Precautions:N/A   Braces: N/A     Occupational Performance:    Bed Mobility:    · NT, pt found seated UIC upon arrival.     Functional Mobility/Transfers:  · Patient completed Sit <> Stand Transfer with max x2 with  no assistive device ( attempted x1 from bedside chair; pt not using BUE's to assistance; pt declined to attempt a second transfer using correct hand placement 2/2 nausea/fatigue) Pt unable to come to full standing position.    Seated scooting ( forward/backward in chair) with TA    Activities of Daily Living:  · Upper Body Dressing: moderate assistance to milady gown like jacket while seated [facility] 6 Click ADL: 13    Treatment & Education:  -Pt edu on OT role/POC  -Importance of OOB activity with staff assistance/ participation with therapy   -Safety during functional t/f and mobility  - White board updated    ** OT/PT provided max encouragement to continue to participate In therapy session and educated pt on the importance/beneift of participation with therapy; however pt politely declined to continue with session 2/2 nausea and overall fatigue.     Patient left up in chair with all lines intact, call button in reach and Rn notified  Education:    Assessment:     Femi Enciso " is a 53 y.o. male with a medical diagnosis of S/P liver transplant.  He presents with the following performance deficits affecting function are weakness, impaired endurance, gait instability, impaired balance, impaired self care skills, impaired functional mobilty, decreased safety awareness, edema, decreased lower extremity function, decreased upper extremity function. Therapy session limited 2/2 pt complaints of nausea and fatigue. Pt would benefit from SNF following d/c to continue to progress towards goals and improve quality of life.     Rehab Prognosis:  Good; patient would benefit from acute skilled OT services to address these deficits and reach maximum level of function.       Plan:     Patient to be seen 4 x/week to address the above listed problems via self-care/home management, therapeutic activities, therapeutic exercises, neuromuscular re-education  · Plan of Care Expires: 12/21/18  · Plan of Care Reviewed with: patient    This Plan of care has been discussed with the patient who was involved in its development and understands and is in agreement with the identified goals and treatment plan    GOALS:   Multidisciplinary Problems     Occupational Therapy Goals        Problem: Occupational Therapy Goal    Goal Priority Disciplines Outcome Interventions   Occupational Therapy Goal     OT, PT/OT Ongoing (interventions implemented as appropriate)    Description:  Goals to be met by: 12/5/18     Patient will increase functional independence with ADLs by performing:    UE Dressing with Supervision.  LE Dressing with Minimal Assistance.  Grooming while standing at sink with Stand-by Assistance.  Toileting from toilet with Minimal Assistance for hygiene and clothing management.   Toilet transfer to toilet with Stand-by Assistance.  Upper extremity  theraband exercise program x  15 reps  with independence.               Problem: Occupational Therapy Goal    Goal Priority Disciplines Outcome Interventions    Occupational Therapy Goal     OT, PT/OT Ongoing (interventions implemented as appropriate)                    Time Tracking:     OT Date of Treatment: 11/27/18  OT Start Time: 0925  OT Stop Time: 0946  OT Total Time (min): 21 min    Billable Minutes:Therapeutic Activity 10  ( Split with PT)    Julia ladd OT  11/27/2018

## 2018-11-27 NOTE — PROGRESS NOTES
Pt sent down for CT scan in bed.  No issues or complaints.  Family notified.  VSS before leaving unit. Will monitor for pts return.

## 2018-11-27 NOTE — PROGRESS NOTES
Pt AAOx2, VSS on Visi monitor.    Chevron incision ECTOR w/ staples.  Painted by family at the bedside.  JOSE A drain to R side w/ ~ 700 cc SS output. Grenade to R side w/ ~ 90 cc SS output.  Both sites dressed CDI.  Pt denies pain.  No SOB. Incentive spirometer at the bedside.  Encouragement given.  Episode of nausea noted this am.  Relief obtained with PRN IV zofran.  No other issues.   Pt eating 25%-75% of meals.  Boost supplements on board. PO intake encouraged.    Blood sugars checked this am with breakfast.  Pt refused lunch. Will check with dinner.  No issues. No insulin needed.  100 cc dark bark urine output this shift. No BM.  PT/OT on board. Pt refused to work with PT this am due to nausea.  Activity encouraged though. Pt up in the chair for most of the morning. Active ROM done.  Fall precautions in place.  Bed lowered and locked in place.  Chair locked in place.  Call bell in reach.  Pt encouraged to call for any assistance needed.    No acute events/falls/injuries this shift.  HD done at the bedside this afternoon without any issues. 1.7 off net.    AM labs monitored.  Self med teaching done.  Box filled and blue card updated.    See flowsheet for further assessment findings. Will monitor.

## 2018-11-27 NOTE — SUBJECTIVE & OBJECTIVE
Interval History: feeling nauseous this morning, increasing LE edema    Review of patient's allergies indicates:   Allergen Reactions    Penicillins Nausea And Vomiting and Rash     Full body rash from cefepime as well     Current Facility-Administered Medications   Medication Frequency    0.9%  NaCl infusion PRN    0.9%  NaCl infusion Once    albuterol-ipratropium 2.5 mg-0.5 mg/3 mL nebulizer solution 3 mL Q4H PRN    ceFEPIme injection 1 g Q12H    DAPTOmycin (CUBICIN) 980 mg in sodium chloride 0.9% IVPB Q24H    dextrose 50% injection 12.5 g PRN    dextrose 50% injection 25 g PRN    famotidine tablet 20 mg QHS    glucagon (human recombinant) injection 1 mg PRN    glucose chewable tablet 16 g PRN    glucose chewable tablet 24 g PRN    heparin (porcine) injection 1,000 Units PRN    heparin (porcine) injection 5,000 Units Q8H    HYDROmorphone injection 0.2 mg Q3H PRN    insulin aspart U-100 pen 0-5 Units QID (AC + HS) PRN    [START ON 11/28/2018] isavuconazonium sulfate Cap 372 mg Daily    metroNIDAZOLE tablet 500 mg Q8H    ondansetron injection 4 mg Q6H PRN    oxyCODONE immediate release tablet 5 mg Q4H PRN    oxyCODONE immediate release tablet Tab 10 mg Q4H PRN    predniSONE tablet 15 mg Daily    Followed by    [START ON 12/3/2018] predniSONE tablet 10 mg Daily    Followed by    [START ON 12/10/2018] predniSONE tablet 5 mg Daily    Followed by    [START ON 12/17/2018] predniSONE tablet 2.5 mg Daily    QUEtiapine tablet 50 mg QHS    ramelteon tablet 8 mg Nightly PRN    sulfamethoxazole-trimethoprim 400-80mg per tablet 1 tablet Twice Weekly    tacrolimus capsule 3 mg BID    ursodiol capsule 300 mg BID    [START ON 11/28/2018] valganciclovir 50 mg/ml oral solution 100 mg Once per day on Mon Wed Fri       Objective:     Vital Signs (Most Recent):  Temp: 98 °F (36.7 °C) (11/27/18 1121)  Pulse: 99 (11/27/18 1445)  Resp: (!) 23 (11/27/18 1445)  BP: 113/81 (11/27/18 1445)  SpO2: 100 % (11/27/18  1445)  O2 Device (Oxygen Therapy): room air (11/26/18 1120) Vital Signs (24h Range):  Temp:  [97.6 °F (36.4 °C)-98.2 °F (36.8 °C)] 98 °F (36.7 °C)  Pulse:  [] 99  Resp:  [17-24] 23  SpO2:  [95 %-100 %] 100 %  BP: ()/(69-87) 113/81     Weight: 86 kg (189 lb 9.5 oz) (11/27/18 0500)  Body mass index is 27.2 kg/m².  Body surface area is 2.06 meters squared.    I/O last 3 completed shifts:  In: 1620 [P.O.:1320; IV Piggyback:300]  Out: 4350 [Drains:4350]    Physical Exam   HENT:   Head: Atraumatic.   Neck: No JVD present.   Cardiovascular: Normal rate and regular rhythm. Exam reveals no friction rub.   Pulmonary/Chest: Effort normal and breath sounds normal.   Abdominal: Soft. He exhibits distension. There is no tenderness.   Musculoskeletal: He exhibits edema.   Neurological: He is alert.   Skin: Skin is warm.   Psychiatric: He has a normal mood and affect.

## 2018-11-27 NOTE — PROGRESS NOTES
"[facility]  Endocrinology  Progress Note    Admit Date: 10/27/2018     Reason for Consult: Management of Hyperglycemia and type 2 DM    Surgical Procedure and Date: liver transplant 18; exploratory lap and liver biopsy on 18      Diabetes diagnosis year: ~ 3-4 years ago     Home Diabetes Medications:  none; previously on metformin 1000 mg BID    How often checking glucose at home? Not checking  BG readings on regimen: n/a  Hypoglycemia on the regimen?  n/a  Missed doses on regimen? n/a    Diabetes Complications include:     DORIAN    Complicating diabetes co morbidities:   CIRRHOSIS and Glucocorticoid use       HPI:   Patient is a 53 y.o. male with a diagnosis of ESLD secondary to ETOH abuse and DEL with ESRD with RRT. Patient is now s/p liver transplant. Endocrinology consulted for BG management.       Lab Results   Component Value Date    HGBA1C 4.4 2018             Interval HPI:   Overnight events: Transferred from ICU to TSU yesterday afternoon, NAEON.  BG well controlled overnight without correction scale insulin administration.  Prednisone 15 mg daily, standard steroid taper.  Noted rise in creatinine, 2.6 today.  On IV antibiotics.  Eatin%  Nausea: Yes  Hypoglycemia and intervention: No  Fever: No  TPN and/or TF: No    /69   Pulse 91   Temp 97.8 °F (36.6 °C) (Oral)   Resp 18   Ht 5' 10" (1.778 m)   Wt 93.2 kg (205 lb 7.5 oz)   SpO2 (P) 98%   BMI 29.48 kg/m²      Labs Reviewed and Include    Recent Labs   Lab 18  0315   *  163*   CALCIUM 7.4*  7.4*   ALBUMIN 1.6*  1.6*   PROT 3.8*     141   K 4.2  4.2   CO2 20*  20*     110   BUN 33*  33*   CREATININE 1.7*  1.7*   ALKPHOS 353*   ALT 74*   AST 58*   BILITOT 6.4*     Lab Results   Component Value Date    WBC 25.37 (H) 2018    HGB 9.1 (L) 2018    HCT 28.0 (L) 2018    MCV 93 2018     2018     No results for input(s): TSH, FREET4 in the last " 168 hours.  Lab Results   Component Value Date    HGBA1C 4.4 11/09/2018       Nutritional status:   Body mass index is 29.48 kg/m².  Lab Results   Component Value Date    ALBUMIN 1.6 (L) 11/26/2018    ALBUMIN 1.6 (L) 11/26/2018    ALBUMIN 1.7 (L) 11/25/2018    ALBUMIN 1.7 (L) 11/25/2018     Lab Results   Component Value Date    PREALBUMIN 7 (L) 10/27/2018       Estimated Creatinine Clearance: 57.6 mL/min (A) (based on SCr of 1.7 mg/dL (H)).    Accu-Checks  Recent Labs     11/25/18  1321 11/25/18  1815 11/25/18  2131 11/26/18  0331   POCTGLUCOSE 221* 166* 174* 178*       Current Medications and/or Treatments Impacting Glycemic Control  Immunotherapy:    Immunosuppressants         Stop Route Frequency     tacrolimus capsule 2 mg      -- SL 2 times daily        Steroids:   Hormones (From admission, onward)    Start     Stop Route Frequency Ordered    11/22/18 1400  hydrocortisone sodium succinate injection 50 mg      -- IV Every 8 hours 11/22/18 0819    11/09/18 1345  methylPREDNISolone sodium succinate injection 500 mg  (Med - Immunosuppression Induction Therapy (Methylprednisolone))      11/10 0144 IV Once pre-op 11/09/18 1236        Pressors:    Autonomic Drugs (From admission, onward)    None        Hyperglycemia/Diabetes Medications:   Antihyperglycemics (From admission, onward)    Start     Stop Route Frequency Ordered    11/25/18 0901  insulin aspart U-100 pen 1-10 Units      -- SubQ Every 6 hours PRN 11/25/18 0801          ASSESSMENT and PLAN    * S/P liver transplant    Managed per LTS.   avoid hypoglycemia  Optimize BG control for surgical wound healing.     Lab Results   Component Value Date    ALT 64 (H) 11/27/2018    AST 49 (H) 11/27/2018     (H) 11/26/2018    ALKPHOS 343 (H) 11/27/2018    BILITOT 5.4 (H) 11/27/2018            Type 2 diabetes mellitus without complication    BG goal 140-180    Change correction scale from moderate to low dose given kidney function  BG monitoring AC/HS    Discharge  plans- TBD     Adrenal cortical steroids causing adverse effect in therapeutic use    On standard steroid taper per transplant team; may elevate BG readings         DEL (acute kidney injury)    Avoid insulin stacking and hypoglycemia.  CRRT per nephrology  Lab Results   Component Value Date    CREATININE 2.6 (H) 11/27/2018            Prophylactic immunotherapy    May increase insulin resistance.            Zoran Almeida NP  Endocrinology  [facility]

## 2018-11-27 NOTE — ASSESSMENT & PLAN NOTE
- Renal function slow to recover post-op.   - Nephrology consulted for management.   - Cont with HD as per nephrology recs.   - Last HD on 11/25. Plan for HD today.   - Pt remains with little UOP and rising Cr level.

## 2018-11-27 NOTE — SUBJECTIVE & OBJECTIVE
Scheduled Meds:   sodium chloride 0.9%   Intravenous Once    albumin human 25%  25 g Intravenous Once    ceFEPime (MAXIPIME) IVPB  1 g Intravenous Q12H    DAPTOmycin (CUBICIN)  IV  10 mg/kg Intravenous Q24H    famotidine  20 mg Oral QHS    heparin (porcine)  5,000 Units Subcutaneous Q8H    custom IVPB builder  372 mg Intravenous Q24H    metroNIDAZOLE  500 mg Oral Q8H    predniSONE  15 mg Oral Daily    Followed by    [START ON 12/3/2018] predniSONE  10 mg Oral Daily    Followed by    [START ON 12/10/2018] predniSONE  5 mg Oral Daily    Followed by    [START ON 12/17/2018] predniSONE  2.5 mg Oral Daily    QUEtiapine  50 mg Oral QHS    sulfamethoxazole-trimethoprim 400-80mg  1 tablet Oral Twice Weekly    tacrolimus  3 mg Sublingual BID    ursodiol  300 mg Oral BID    [START ON 11/28/2018] valganciclovir 50 mg/ml  100 mg Oral Once per day on Mon Wed Fri     Continuous Infusions:  PRN Meds:sodium chloride 0.9%, albuterol-ipratropium, dextrose 50%, dextrose 50%, glucagon (human recombinant), glucose, glucose, heparin (porcine), HYDROmorphone, insulin aspart U-100, ondansetron, oxyCODONE, oxyCODONE, ramelteon    Review of Systems   Constitutional: Positive for activity change, appetite change and fatigue. Negative for fever.   HENT: Negative.  Negative for facial swelling.    Eyes: Negative.    Respiratory: Negative.  Negative for apnea, chest tightness, shortness of breath and wheezing.    Cardiovascular: Negative.  Negative for chest pain, palpitations and leg swelling.   Gastrointestinal: Positive for abdominal distention and abdominal pain. Negative for constipation, diarrhea, nausea and vomiting.   Genitourinary: Negative.  Negative for decreased urine volume, difficulty urinating, dysuria, hematuria and urgency.   Musculoskeletal: Negative.  Negative for back pain, gait problem, neck pain and neck stiffness.   Skin: Positive for wound. Negative for color change and pallor.   Allergic/Immunologic:  Positive for immunocompromised state.   Neurological: Positive for weakness. Negative for dizziness, seizures, light-headedness and headaches.   Psychiatric/Behavioral: Negative.  Negative for behavioral problems, confusion, hallucinations, sleep disturbance and suicidal ideas. The patient is not nervous/anxious.      Objective:     Vital Signs (Most Recent):  Temp: 98 °F (36.7 °C) (11/27/18 1121)  Pulse: 103 (11/27/18 1145)  Resp: (!) 22 (11/27/18 1145)  BP: 107/77 (11/27/18 1145)  SpO2: 100 % (11/27/18 1145) Vital Signs (24h Range):  Temp:  [97.6 °F (36.4 °C)-98.2 °F (36.8 °C)] 98 °F (36.7 °C)  Pulse:  [] 103  Resp:  [17-22] 22  SpO2:  [95 %-100 %] 100 %  BP: (103-119)/(69-82) 107/77     Weight: 86 kg (189 lb 9.5 oz)  Body mass index is 27.2 kg/m².    Intake/Output - Last 3 Shifts       11/25 0700 - 11/26 0659 11/26 0700 - 11/27 0659 11/27 0700 - 11/28 0659    P.O. 1540 600     I.V. (mL/kg) 533 (5.7)      Other 450      IV Piggyback  300     Total Intake(mL/kg) 2523 (27.1) 900 (10.5)     Urine (mL/kg/hr) 0 (0) 0 (0)     Emesis/NG output  0     Drains 4310 2280 260    Other 2000 0     Stool 0 0     Blood  0     Total Output 6310 2280 260    Net -3787 -1380 -260           Urine Occurrence 1 x 0 x     Stool Occurrence 2 x 1 x     Emesis Occurrence  0 x           Physical Exam   Constitutional: He is oriented to person, place, and time. He appears well-developed. No distress.   HENT:   Head: Normocephalic and atraumatic.   Neck: Normal range of motion. Neck supple. No JVD present.   Cardiovascular: Normal rate, regular rhythm and normal heart sounds.   No murmur heard.  Pulmonary/Chest: Effort normal. No respiratory distress. He has decreased breath sounds in the right lower field and the left lower field. He has no wheezes. He exhibits no tenderness.   Abdominal: Soft. Bowel sounds are normal. He exhibits no distension. There is tenderness.   Chevron inc ECTOR w/ staples  RLQ JOSE A drain and percutaneous drain  present.    Musculoskeletal: Normal range of motion. He exhibits edema. He exhibits no tenderness.   Neurological: He is alert and oriented to person, place, and time. He has normal reflexes.   Skin: Skin is warm and dry. He is not diaphoretic.   Psychiatric: He has a normal mood and affect. His behavior is normal. Judgment and thought content normal.   Nursing note and vitals reviewed.      Laboratory:  Immunosuppressants         Stop Route Frequency     tacrolimus capsule 3 mg      -- SL 2 times daily        CBC:   Recent Labs   Lab 11/27/18 0612   WBC 19.43*   RBC 2.94*   HGB 8.6*   HCT 27.1*      MCV 92   MCH 29.3   MCHC 31.7*     CMP:   Recent Labs   Lab 11/27/18 0612   GLU 89   CALCIUM 7.8*   ALBUMIN 1.7*   PROT 4.0*      K 4.5   CO2 22*      BUN 46*   CREATININE 2.6*   ALKPHOS 343*   ALT 64*   AST 49*   BILITOT 5.4*     Coagulation:   Recent Labs   Lab 11/27/18 0612   INR 1.2   APTT 28.2     Labs within the past 24 hours have been reviewed.    Diagnostic Results:  None

## 2018-11-27 NOTE — SUBJECTIVE & OBJECTIVE
Interval History: Doing well today. Afebrile. Drain output slowing. WBC and bilirubin continue to improve. CT A/P pending to reassess fluid collection to finalize antibiotics recs.    Review of Systems   Constitutional: Negative for activity change, appetite change, chills, fatigue and fever.   HENT: Negative for congestion, ear pain, rhinorrhea and sore throat.    Eyes: Negative for pain, discharge and visual disturbance.   Respiratory: Negative for cough, chest tightness and shortness of breath.    Cardiovascular: Negative for chest pain and palpitations.   Gastrointestinal: Negative for abdominal distention, abdominal pain, blood in stool, constipation, diarrhea, nausea and vomiting.   Genitourinary: Negative for difficulty urinating.   Musculoskeletal: Negative for back pain and neck pain.   Skin: Negative for color change and rash.   Neurological: Negative for dizziness, numbness and headaches.   Hematological: Negative for adenopathy.   Psychiatric/Behavioral: Negative for behavioral problems and confusion.   All other systems reviewed and are negative.    Objective:     Vital Signs (Most Recent):  Temp: 98 °F (36.7 °C) (11/27/18 1121)  Pulse: 99 (11/27/18 1445)  Resp: (!) 23 (11/27/18 1445)  BP: 113/81 (11/27/18 1445)  SpO2: 100 % (11/27/18 1445) Vital Signs (24h Range):  Temp:  [98 °F (36.7 °C)-98.2 °F (36.8 °C)] 98 °F (36.7 °C)  Pulse:  [] 99  Resp:  [17-24] 23  SpO2:  [95 %-100 %] 100 %  BP: ()/(71-87) 113/81     Weight: 86 kg (189 lb 9.5 oz)  Body mass index is 27.2 kg/m².    Estimated Creatinine Clearance: 33.9 mL/min (A) (based on SCr of 2.6 mg/dL (H)).    Physical Exam   Constitutional: He appears well-developed. No distress.   HENT:   Head: Atraumatic.   Mouth/Throat: Oropharynx is clear and moist. No oropharyngeal exudate.   Eyes: Conjunctivae and EOM are normal. Pupils are equal, round, and reactive to light. No scleral icterus.   Neck: Neck supple.   Cardiovascular: Normal rate and  regular rhythm. Exam reveals no friction rub.   No murmur heard.  Pulmonary/Chest: Breath sounds normal. No respiratory distress. He has no wheezes. He has no rales. He exhibits no tenderness.   Abdominal: Soft. Bowel sounds are normal. He exhibits distension. There is no tenderness. There is no rebound and no guarding.   SS fluid in both drains.   Musculoskeletal: Normal range of motion. He exhibits edema.   Lymphadenopathy:     He has no cervical adenopathy.   Neurological: He is alert.   Skin: No rash noted. No erythema.       Significant Labs:   CBC:   Recent Labs   Lab 11/26/18 0315 11/27/18 0612   WBC 25.37* 19.43*   HGB 9.1* 8.6*   HCT 28.0* 27.1*    228     CMP:   Recent Labs   Lab 11/25/18  2200 11/26/18 0315 11/27/18 0612     140 141  141 137   K 4.4  4.4 4.2  4.2 4.5     108 110  110 107   CO2 20*  20* 20*  20* 22*   *  150* 163*  163* 89   BUN 29*  29* 33*  33* 46*   CREATININE 1.7*  1.7* 1.7*  1.7* 2.6*   CALCIUM 7.6*  7.6* 7.4*  7.4* 7.8*   PROT  --  3.8* 4.0*   ALBUMIN 1.7*  1.7* 1.6*  1.6* 1.7*   BILITOT  --  6.4* 5.4*   ALKPHOS  --  353* 343*   AST  --  58* 49*   ALT  --  74* 64*   ANIONGAP 12  12 11  11 8   EGFRNONAA 45.0*  45.0* 45.0*  45.0* 26.9*       Significant Imaging: I have reviewed all pertinent imaging results/findings within the past 24 hours.

## 2018-11-27 NOTE — ASSESSMENT & PLAN NOTE
- Pt remains significantly debilitated.   - PT/OT for strengthening.   - Currently will need SNF for placement and further rehabilitation when medically stable.

## 2018-11-27 NOTE — PLAN OF CARE
"Problem: Patient Care Overview  Goal: Plan of Care Review  Dx: Liver transplant (11/11)  hx: ESLD, ETOH abuse; Severe depression.    11/12: liver transplant; extubated. Products given in OR and HD in OR. CRRT nocturnal.  11/13: CRRT nocturnal held; lines removed. FFP x1 given. Pulled out 2 JOSE A drains.   11/14: 3 PRBC's given for hgb 6; liver US shows potential hematoma. Trend CBC. Extreme agitation/attempted to "kill self" by holding breath for as long as possible multiple times. IV ativan/haldol given. Nocturnal CRRT restarted  Potential washout  To be determined.    11/16: OR for exlap and washout--1U PRBCs and 1 plts; sent back to OR--3 U PRBCs, 1FFP, 1cryo; abd open and wound vac placed  11/17: CT chest, abd, pelvis, levo off 2 units PRBCs, 2 units platelets, 1 unit FFP   11/18: OR for closure, extubated   11/19: clear liquid diet   11/20: TPN/Lipids d/c'd, renal diet, transfer orders  11/21: Head and chest CT  11/22: Pt to IR for drain placement  11/23: HD trial  11/25: HD 2 Liters removed  Nursing:   -160          Outcome: Ongoing (interventions implemented as appropriate)  Pt has mother at bedside, call bell in reach, non slip socks on, and bedrails up x2. Pt has labs in am. Pt working with PT during day. Pt encouraged to wash hands. Pt on iv antibiotics. Pt seeing speech during day. Pt has accuchecks ac/hs.       "

## 2018-11-27 NOTE — PLAN OF CARE
Problem: Physical Therapy Goal  Goal: Physical Therapy Goal  Goals to be met by:      Patient will increase functional independence with mobility by performin. Supine to sit with Modified Kinney -not met  2. Sit to stand transfer with Kin[place] -not met  3. Bed to chair transfer with independence using no AD -not met  4. Gait  x150 feet with Supervision using LRAD. -not met  5. Lower extremity exercise program x20 reps per handout, with independence -not met       Outcome: Ongoing (interventions implemented as appropriate)  Goals reviewed and remain appropriate. Pt progressing towards goals.    Ramandeep Kumar, PT, DPT   2018  [phone]

## 2018-11-27 NOTE — PROGRESS NOTES
Patient returned to unit per stretcher by transport aide. No acute distress noted. Will continue to monitor.

## 2018-11-28 LAB
ALBUMIN SERPL BCP-MCNC: 1.9 G/DL
ALP SERPL-CCNC: 341 U/L
ALT SERPL W/O P-5'-P-CCNC: 57 U/L
ANION GAP SERPL CALC-SCNC: 6 MMOL/L
APTT BLDCRRT: 30.1 SEC
AST SERPL-CCNC: 47 U/L
BASOPHILS # BLD AUTO: 0.17 K/UL
BASOPHILS NFR BLD: 0.9 %
BILIRUB SERPL-MCNC: 4.9 MG/DL
BUN SERPL-MCNC: 31 MG/DL
CALCIUM SERPL-MCNC: 7.8 MG/DL
CHLORIDE SERPL-SCNC: 107 MMOL/L
CO2 SERPL-SCNC: 25 MMOL/L
CREAT SERPL-MCNC: 2.2 MG/DL
DIFFERENTIAL METHOD: ABNORMAL
EOSINOPHIL # BLD AUTO: 1.4 K/UL
EOSINOPHIL NFR BLD: 7.6 %
ERYTHROCYTE [DISTWIDTH] IN BLOOD BY AUTOMATED COUNT: 19.2 %
EST. GFR  (AFRICAN AMERICAN): 38.1 ML/MIN/1.73 M^2
EST. GFR  (NON AFRICAN AMERICAN): 33 ML/MIN/1.73 M^2
GLUCOSE SERPL-MCNC: 89 MG/DL
HCT VFR BLD AUTO: 24.5 %
HGB BLD-MCNC: 8 G/DL
IMM GRANULOCYTES # BLD AUTO: 0.41 K/UL
IMM GRANULOCYTES NFR BLD AUTO: 2.2 %
INR PPP: 1.2
LYMPHOCYTES # BLD AUTO: 1.1 K/UL
LYMPHOCYTES NFR BLD: 6 %
MCH RBC QN AUTO: 30.1 PG
MCHC RBC AUTO-ENTMCNC: 32.7 G/DL
MCV RBC AUTO: 92 FL
MONOCYTES # BLD AUTO: 0.5 K/UL
MONOCYTES NFR BLD: 2.5 %
NEUTROPHILS # BLD AUTO: 14.7 K/UL
NEUTROPHILS NFR BLD: 80.8 %
NRBC BLD-RTO: 0 /100 WBC
PHOSPHATE SERPL-MCNC: 3.3 MG/DL
PLATELET # BLD AUTO: 231 K/UL
PMV BLD AUTO: 13.7 FL
POCT GLUCOSE: 136 MG/DL (ref 70–110)
POCT GLUCOSE: 161 MG/DL (ref 70–110)
POCT GLUCOSE: 223 MG/DL (ref 70–110)
POCT GLUCOSE: 91 MG/DL (ref 70–110)
POTASSIUM SERPL-SCNC: 3.9 MMOL/L
PROT SERPL-MCNC: 4.2 G/DL
PROTHROMBIN TIME: 12.7 SEC
RBC # BLD AUTO: 2.66 M/UL
SODIUM SERPL-SCNC: 138 MMOL/L
TACROLIMUS BLD-MCNC: 6.9 NG/ML
WBC # BLD AUTO: 18.24 K/UL

## 2018-11-28 PROCEDURE — 85610 PROTHROMBIN TIME: CPT

## 2018-11-28 PROCEDURE — 25000003 PHARM REV CODE 250: Performed by: INTERNAL MEDICINE

## 2018-11-28 PROCEDURE — 63600175 PHARM REV CODE 636 W HCPCS: Performed by: TRANSPLANT SURGERY

## 2018-11-28 PROCEDURE — 20600001 HC STEP DOWN PRIVATE ROOM

## 2018-11-28 PROCEDURE — 85730 THROMBOPLASTIN TIME PARTIAL: CPT

## 2018-11-28 PROCEDURE — 63600175 PHARM REV CODE 636 W HCPCS: Performed by: STUDENT IN AN ORGANIZED HEALTH CARE EDUCATION/TRAINING PROGRAM

## 2018-11-28 PROCEDURE — 80197 ASSAY OF TACROLIMUS: CPT

## 2018-11-28 PROCEDURE — 25000003 PHARM REV CODE 250: Performed by: STUDENT IN AN ORGANIZED HEALTH CARE EDUCATION/TRAINING PROGRAM

## 2018-11-28 PROCEDURE — 92526 ORAL FUNCTION THERAPY: CPT

## 2018-11-28 PROCEDURE — 84100 ASSAY OF PHOSPHORUS: CPT

## 2018-11-28 PROCEDURE — 25000003 PHARM REV CODE 250: Performed by: NURSE PRACTITIONER

## 2018-11-28 PROCEDURE — 99232 PR SUBSEQUENT HOSPITAL CARE,LEVL II: ICD-10-PCS | Mod: GC,,, | Performed by: INTERNAL MEDICINE

## 2018-11-28 PROCEDURE — 63600175 PHARM REV CODE 636 W HCPCS: Performed by: NURSE PRACTITIONER

## 2018-11-28 PROCEDURE — 80053 COMPREHEN METABOLIC PANEL: CPT

## 2018-11-28 PROCEDURE — 99233 SBSQ HOSP IP/OBS HIGH 50: CPT | Mod: 24,,, | Performed by: NURSE PRACTITIONER

## 2018-11-28 PROCEDURE — 99232 SBSQ HOSP IP/OBS MODERATE 35: CPT | Mod: GC,,, | Performed by: INTERNAL MEDICINE

## 2018-11-28 PROCEDURE — 99233 PR SUBSEQUENT HOSPITAL CARE,LEVL III: ICD-10-PCS | Mod: 24,,, | Performed by: NURSE PRACTITIONER

## 2018-11-28 PROCEDURE — 85025 COMPLETE CBC W/AUTO DIFF WBC: CPT

## 2018-11-28 PROCEDURE — 99233 SBSQ HOSP IP/OBS HIGH 50: CPT | Mod: ,,, | Performed by: INTERNAL MEDICINE

## 2018-11-28 PROCEDURE — 99233 PR SUBSEQUENT HOSPITAL CARE,LEVL III: ICD-10-PCS | Mod: ,,, | Performed by: INTERNAL MEDICINE

## 2018-11-28 PROCEDURE — 63600175 PHARM REV CODE 636 W HCPCS: Performed by: SURGERY

## 2018-11-28 PROCEDURE — 63600175 PHARM REV CODE 636 W HCPCS: Mod: JG | Performed by: INTERNAL MEDICINE

## 2018-11-28 RX ORDER — TACROLIMUS 1 MG/1
6 CAPSULE ORAL 2 TIMES DAILY
Status: DISCONTINUED | OUTPATIENT
Start: 2018-11-28 | End: 2018-11-30

## 2018-11-28 RX ORDER — LIDOCAINE HYDROCHLORIDE 10 MG/ML
1 INJECTION INFILTRATION; PERINEURAL ONCE
Status: DISCONTINUED | OUTPATIENT
Start: 2018-11-28 | End: 2018-12-13

## 2018-11-28 RX ORDER — FUROSEMIDE 10 MG/ML
160 INJECTION INTRAMUSCULAR; INTRAVENOUS ONCE
Status: COMPLETED | OUTPATIENT
Start: 2018-11-28 | End: 2018-11-28

## 2018-11-28 RX ADMIN — URSODIOL 300 MG: 300 CAPSULE ORAL at 08:11

## 2018-11-28 RX ADMIN — TACROLIMUS 3 MG: 1 CAPSULE ORAL at 08:11

## 2018-11-28 RX ADMIN — METRONIDAZOLE 500 MG: 500 TABLET ORAL at 05:11

## 2018-11-28 RX ADMIN — ISAVUCONAZONIUM SULFATE 372 MG: 186 CAPSULE ORAL at 08:11

## 2018-11-28 RX ADMIN — TACROLIMUS 6 MG: 1 CAPSULE ORAL at 05:11

## 2018-11-28 RX ADMIN — HEPARIN SODIUM 5000 UNITS: 5000 INJECTION, SOLUTION INTRAVENOUS; SUBCUTANEOUS at 01:11

## 2018-11-28 RX ADMIN — QUETIAPINE FUMARATE 50 MG: 25 TABLET, FILM COATED ORAL at 09:11

## 2018-11-28 RX ADMIN — HEPARIN SODIUM 5000 UNITS: 5000 INJECTION, SOLUTION INTRAVENOUS; SUBCUTANEOUS at 05:11

## 2018-11-28 RX ADMIN — FUROSEMIDE 160 MG: 10 INJECTION, SOLUTION INTRAMUSCULAR; INTRAVENOUS at 01:11

## 2018-11-28 RX ADMIN — METRONIDAZOLE 500 MG: 500 TABLET ORAL at 01:11

## 2018-11-28 RX ADMIN — HEPARIN SODIUM 5000 UNITS: 5000 INJECTION, SOLUTION INTRAVENOUS; SUBCUTANEOUS at 09:11

## 2018-11-28 RX ADMIN — URSODIOL 300 MG: 300 CAPSULE ORAL at 09:11

## 2018-11-28 RX ADMIN — DAPTOMYCIN 980 MG: 500 INJECTION, POWDER, LYOPHILIZED, FOR SOLUTION INTRAVENOUS at 11:11

## 2018-11-28 RX ADMIN — CEFEPIME 1 G: 1 INJECTION, POWDER, FOR SOLUTION INTRAMUSCULAR; INTRAVENOUS at 09:11

## 2018-11-28 RX ADMIN — METRONIDAZOLE 500 MG: 500 TABLET ORAL at 09:11

## 2018-11-28 RX ADMIN — FAMOTIDINE 20 MG: 20 TABLET ORAL at 09:11

## 2018-11-28 RX ADMIN — PREDNISONE 15 MG: 5 TABLET ORAL at 08:11

## 2018-11-28 RX ADMIN — MORPHINE 100 MG: 10 SOLUTION ORAL at 09:11

## 2018-11-28 RX ADMIN — CEFEPIME 1 G: 1 INJECTION, POWDER, FOR SOLUTION INTRAMUSCULAR; INTRAVENOUS at 11:11

## 2018-11-28 RX ADMIN — OXYCODONE HYDROCHLORIDE 5 MG: 5 TABLET ORAL at 10:11

## 2018-11-28 RX ADMIN — INSULIN ASPART 1 UNITS: 100 INJECTION, SOLUTION INTRAVENOUS; SUBCUTANEOUS at 09:11

## 2018-11-28 NOTE — ASSESSMENT & PLAN NOTE
- Cont with bactrim and valcyte for protection against opportunistic infections.   - cont Isavuconazonium.

## 2018-11-28 NOTE — ASSESSMENT & PLAN NOTE
- Renal function slow to recover post-op.   - Nephrology consulted for management.   - Cont with HD as per nephrology recs.  Apprec recs.    - Last HD on 11/27 w/ 2L off. Pt tolerated well.    - Lasix 160 mg challenge 11/28- pt diuresed recorded 105 ml and 2 missed.

## 2018-11-28 NOTE — SUBJECTIVE & OBJECTIVE
Scheduled Meds:   sodium chloride 0.9%   Intravenous Once    ceFEPime (MAXIPIME) IVPB  1 g Intravenous Q12H    DAPTOmycin (CUBICIN)  IV  10 mg/kg Intravenous Q24H    famotidine  20 mg Oral QHS    heparin (porcine)  5,000 Units Subcutaneous Q8H    isavuconazonium sulfate  372 mg Oral Daily    metroNIDAZOLE  500 mg Oral Q8H    predniSONE  15 mg Oral Daily    Followed by    [START ON 12/3/2018] predniSONE  10 mg Oral Daily    Followed by    [START ON 12/10/2018] predniSONE  5 mg Oral Daily    Followed by    [START ON 12/17/2018] predniSONE  2.5 mg Oral Daily    QUEtiapine  50 mg Oral QHS    sulfamethoxazole-trimethoprim 400-80mg  1 tablet Oral Twice Weekly    tacrolimus  3 mg Sublingual BID    ursodiol  300 mg Oral BID    valganciclovir 50 mg/ml  100 mg Oral Once per day on Mon Wed Fri     Continuous Infusions:  PRN Meds:sodium chloride 0.9%, albuterol-ipratropium, dextrose 50%, dextrose 50%, glucagon (human recombinant), glucose, glucose, heparin (porcine), HYDROmorphone, insulin aspart U-100, ondansetron, oxyCODONE, oxyCODONE, ramelteon    Review of Systems   Constitutional: Positive for appetite change and fatigue. Negative for activity change and fever.   HENT: Negative.  Negative for facial swelling.    Eyes: Negative.    Respiratory: Negative.  Negative for apnea, chest tightness, shortness of breath and wheezing.    Cardiovascular: Negative.  Negative for chest pain, palpitations and leg swelling.   Gastrointestinal: Positive for abdominal distention and abdominal pain. Negative for constipation, diarrhea, nausea and vomiting.   Genitourinary: Negative.  Negative for decreased urine volume, difficulty urinating, dysuria, hematuria and urgency.   Musculoskeletal: Negative.  Negative for back pain, gait problem, neck pain and neck stiffness.   Skin: Positive for wound. Negative for color change and pallor.   Allergic/Immunologic: Positive for immunocompromised state.   Neurological: Positive for  weakness. Negative for dizziness, seizures, light-headedness and headaches.   Psychiatric/Behavioral: Negative for behavioral problems, confusion, decreased concentration, hallucinations, sleep disturbance and suicidal ideas. The patient is not nervous/anxious.      Objective:     Vital Signs (Most Recent):  Temp: 98.5 °F (36.9 °C) (11/28/18 0715)  Pulse: 107 (11/28/18 1115)  Resp: 20 (11/28/18 1115)  BP: 95/70 (11/28/18 1115)  SpO2: 98 % (11/28/18 1115) Vital Signs (24h Range):  Temp:  [98.5 °F (36.9 °C)-98.6 °F (37 °C)] 98.5 °F (36.9 °C)  Pulse:  [] 107  Resp:  [17-24] 20  SpO2:  [96 %-100 %] 98 %  BP: ()/(67-87) 95/70     Weight: 83.1 kg (183 lb 3.2 oz)  Body mass index is 26.29 kg/m².    Intake/Output - Last 3 Shifts       11/26 0700 - 11/27 0659 11/27 0700 - 11/28 0659 11/28 0700 - 11/29 0659    P.O. 600 1100 200    I.V. (mL/kg)  0 (0) 0 (0)    Other  350 0    IV Piggyback 300      Total Intake(mL/kg) 900 (10.5) 1450 (17.4) 200 (2.4)    Urine (mL/kg/hr) 0 (0) 150 (0.1) 75 (0.2)    Emesis/NG output 0      Drains 2280 980 260    Other 0 2050     Stool 0 0 0    Blood 0      Total Output 2280 3180 335    Net -1380 -1730 -135           Urine Occurrence 0 x 0 x 0 x    Stool Occurrence 1 x 1 x 0 x    Emesis Occurrence 0 x            Physical Exam   Constitutional: He is oriented to person, place, and time. He appears well-developed. No distress.   HENT:   Head: Normocephalic and atraumatic.   Neck: Normal range of motion. Neck supple. No JVD present.   Cardiovascular: Normal rate, regular rhythm and normal heart sounds.   No murmur heard.  Pulmonary/Chest: Effort normal. No respiratory distress. He has decreased breath sounds in the right lower field and the left lower field. He has no wheezes. He exhibits no tenderness.   Abdominal: Soft. Bowel sounds are normal. He exhibits no distension. There is tenderness.   Chevron inc ECTOR w/ staples  RLQ JOSE A drain and percutaneous drain present.    Musculoskeletal:  Normal range of motion. He exhibits edema. He exhibits no tenderness.   Neurological: He is alert and oriented to person, place, and time. He has normal reflexes.   Skin: Skin is warm and dry. He is not diaphoretic.   Psychiatric: He has a normal mood and affect. His behavior is normal. Judgment and thought content normal.   Nursing note and vitals reviewed.      Laboratory:  Immunosuppressants         Stop Route Frequency     tacrolimus capsule 3 mg      -- SL 2 times daily        CBC:   Recent Labs   Lab 11/28/18  0631   WBC 18.24*   RBC 2.66*   HGB 8.0*   HCT 24.5*      MCV 92   MCH 30.1   MCHC 32.7     CMP:   Recent Labs   Lab 11/28/18  0631   GLU 89   CALCIUM 7.8*   ALBUMIN 1.9*   PROT 4.2*      K 3.9   CO2 25      BUN 31*   CREATININE 2.2*   ALKPHOS 341*   ALT 57*   AST 47*   BILITOT 4.9*     Coagulation:   Recent Labs   Lab 11/28/18  0632   INR 1.2   APTT 30.1     Tacrolimus Lvl   Date Value Ref Range Status   11/28/2018 6.9 5.0 - 15.0 ng/mL Final     Comment:     Testing performed by Liquid Chromatography-Tandem  Mass Spectrometry (LC-MS/MS).  This test was developed and its performance characteristics  determined by [facility], Department of Pathology  and Laboratory Medicine in a manner consistent with CLIA  requirements. It has not been cleared or approved by the [place]  Food and Drug Administration.  This test is used for clinical   purposes.  It should not be regarded as investigational or for  research.     11/27/2018 5.2 5.0 - 15.0 ng/mL Final     Comment:     Testing performed by Liquid Chromatography-Tandem  Mass Spectrometry (LC-MS/MS).  This test was developed and its performance characteristics  determined by [facility], Department of Pathology  and Laboratory Medicine in a manner consistent with CLIA  requirements. It has not been cleared or approved by the [place]  Food and Drug Administration.  This test is used for clinical   purposes.  It should not be  regarded as investigational or for  research.     11/26/2018 5.1 5.0 - 15.0 ng/mL Final     Comment:     Testing performed by Liquid Chromatography-Tandem  Mass Spectrometry (LC-MS/MS).  This test was developed and its performance characteristics  determined by [facility], Department of Pathology  and Laboratory Medicine in a manner consistent with CLIA  requirements. It has not been cleared or approved by the [place]  Food and Drug Administration.  This test is used for clinical   purposes.  It should not be regarded as investigational or for  research.     11/25/2018 2.9 (L) 5.0 - 15.0 ng/mL Final     Comment:     Testing performed by Liquid Chromatography-Tandem  Mass Spectrometry (LC-MS/MS).  This test was developed and its performance characteristics  determined by [facility], Department of Pathology  and Laboratory Medicine in a manner consistent with CLIA  requirements. It has not been cleared or approved by the [place]  Food and Drug Administration.  This test is used for clinical   purposes.  It should not be regarded as investigational or for  research.         Labs within the past 24 hours have been reviewed.    Diagnostic Results:  None

## 2018-11-28 NOTE — ASSESSMENT & PLAN NOTE
- Pt with intermittent confusion since transplant but has now improved slowly.   - Pt with some lethargy and confusion on 11/21. Head CT negative.   - AAOx3. More alert and awake on 11/27.   - AAOx4 on 11/29.  He was happy he could remember what day it is today.  Cont with Seroquel nightly   - Monitor closely.

## 2018-11-28 NOTE — ASSESSMENT & PLAN NOTE
- Pt remains significantly debilitated.   - PT/OT for strengthening.   - Currently will need SNF for placement and further rehabilitation.

## 2018-11-28 NOTE — PROGRESS NOTES
VITALIY met with pt and pt's spouse (Suzy Enciso) in room this morning to f/u for continuity of care.  Pt initially found seated upright in recliner at bedside but was assisted to bed by PCT later in SW visit.  Pt presents a&ox4 with pleasant mood and affect.  Pt observed requiring max - total assistance with transfer from chair to bed.  Pt denies having any particular questions or psychosocial concerns at this time.  Pt reports adequate coping.  Suzy reports having begun reviewing educational information provided by inpatient transplant coordinator.  Suzy also reports being able to pull pt's medications as well.  SW provided Suzy with information about [facility] program as well as 2 additional IOP programs in the area ([facility] and [facility]).  VITALIY also instructed Suzy/pt to complete SAMEER paperwork with IOP program of their choice to allow release of information to transplant team.  Suzy expressed understanding regarding same.  Suzy inquired about anticipated length of stay locally post-d/c as she and pt were hoping to return to [place] by mid-January 2019 since Suzy is expected to return to work at that time.  VITALIY informed pt/spouse that local stay could exceed that timeframe but it is ultimately up to transplant team and based on pt's recovery.  Suzy denies having any additional questions or psychosocial concerns and report adequate coping at this time.  VITALIY remains available for further psychosocial support and discharge planning and stated plans of f/u as needed.

## 2018-11-28 NOTE — PROGRESS NOTES
[facility]  Nephrology  Progress Note    Patient Name: Femi Enciso  MRN: [id number]  Admission Date: 10/27/2018  Hospital Length of Stay: 32 days  Attending Provider: Femi Patel MD   Primary Care Physician: Primary Doctor No  Principal Problem:S/P liver transplant    Subjective:     HPI: 52 y/o man with DM2 presents to the ED with family for liver failure (likely due to EtOH abundant history of drinking - diagnosed in Sept 2018 in [place]).  He reports jaundice, generalized weakness, nausea, diarrhea, and decreased appetite since Sept 2018.  He is from [place], [place], and Dr. Sharma (patients physician) recommended bringing him to hospital for evaluation.  Patient denies any fever, chills, vomiting, chest pain, palpitations, SOB, abdominal pain.      Nephrology consulted for evaluation/management Adri.     Interval History: beginning to make some urine, 220s ccs +/- over past 24hrs    Review of patient's allergies indicates:   Allergen Reactions    Penicillins Nausea And Vomiting and Rash     Full body rash from cefepime as well     Current Facility-Administered Medications   Medication Frequency    0.9%  NaCl infusion PRN    0.9%  NaCl infusion Once    albuterol-ipratropium 2.5 mg-0.5 mg/3 mL nebulizer solution 3 mL Q4H PRN    ceFEPIme injection 1 g Q12H    DAPTOmycin (CUBICIN) 980 mg in sodium chloride 0.9% IVPB Q24H    dextrose 50% injection 12.5 g PRN    dextrose 50% injection 25 g PRN    famotidine tablet 20 mg QHS    glucagon (human recombinant) injection 1 mg PRN    glucose chewable tablet 16 g PRN    glucose chewable tablet 24 g PRN    heparin (porcine) injection 1,000 Units PRN    heparin (porcine) injection 5,000 Units Q8H    HYDROmorphone injection 0.2 mg Q3H PRN    insulin aspart U-100 pen 0-5 Units QID (AC + HS) PRN    isavuconazonium sulfate Cap 372 mg Daily    lidocaine HCL 10 mg/ml (1%) injection 1 mL Once    metroNIDAZOLE tablet 500 mg Q8H    ondansetron injection  4 mg Q6H PRN    oxyCODONE immediate release tablet 5 mg Q4H PRN    oxyCODONE immediate release tablet Tab 10 mg Q4H PRN    predniSONE tablet 15 mg Daily    Followed by    [START ON 12/3/2018] predniSONE tablet 10 mg Daily    Followed by    [START ON 12/10/2018] predniSONE tablet 5 mg Daily    Followed by    [START ON 12/17/2018] predniSONE tablet 2.5 mg Daily    QUEtiapine tablet 50 mg QHS    ramelteon tablet 8 mg Nightly PRN    sulfamethoxazole-trimethoprim 400-80mg per tablet 1 tablet Twice Weekly    tacrolimus capsule 6 mg BID    ursodiol capsule 300 mg BID    valganciclovir 50 mg/ml oral solution 100 mg Once per day on Mon Wed Fri       Objective:     Vital Signs (Most Recent):  Temp: 98.2 °F (36.8 °C) (11/28/18 1212)  Pulse: 103 (11/28/18 1212)  Resp: 20 (11/28/18 1212)  BP: 118/80 (11/28/18 1212)  SpO2: 99 % (11/28/18 1212)  O2 Device (Oxygen Therapy): room air (11/26/18 1120) Vital Signs (24h Range):  Temp:  [98.2 °F (36.8 °C)-98.6 °F (37 °C)] 98.2 °F (36.8 °C)  Pulse:  [] 103  Resp:  [17-23] 20  SpO2:  [97 %-99 %] 99 %  BP: ()/(67-80) 118/80     Weight: 83.1 kg (183 lb 3.2 oz) (11/28/18 0500)  Body mass index is 26.29 kg/m².  Body surface area is 2.03 meters squared.    I/O last 3 completed shifts:  In: 1690 [P.O.:1340; Other:350]  Out: 3630 [Urine:150; Drains:1430; Other:2050]    Physical Exam   Constitutional: He is oriented to person, place, and time.   HENT:   Head: Atraumatic.   Eyes: EOM are normal.   Neck: No JVD present.   Cardiovascular: Normal rate and regular rhythm. Exam reveals no friction rub.   Pulmonary/Chest: Effort normal and breath sounds normal.   Abdominal: Soft. He exhibits distension.   Musculoskeletal: He exhibits edema.   Neurological: He is alert and oriented to person, place, and time.   Skin: Skin is warm.   Psychiatric: He has a normal mood and affect.         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   - s/p OHLTx 11/11/2018; intra-op SLED  - remaining anuric, but clearance numbers look ok, on SLED overnight  -hemodynamically stable, off pressors    -increasing urine output, also maybe some concern for retention based on inabilty and resistance on attempted in & out catherization two days ago    Plan:  -hold on RRT today  -recommend attempting another in & out catherization to r/o obstructive component, if unable, then urology consult  -challenge with high dose IV furosemide 120-160             Thank you for your consult. I will follow-up with patient. Please contact us if you have any additional questions.    Petar Hoyos MD  Nephrology  [facility]    ATTENDING PHYSICIAN ATTESTATION  I have personally interviewed and examined the patient. I thoroughly reviewed the demographic, clinical, laboratorial and imaging information available in medical records. I agree with the assessment and recommendations provided by the PGY5 Dr Hoyos who was under my supervision.

## 2018-11-28 NOTE — PROGRESS NOTES
[facility]  Liver Transplant  Progress Note    Patient Name: Femi Enciso  MRN: [id number]  Admission Date: 10/27/2018  Hospital Length of Stay: 32 days  Code Status: Full Code  Primary Care Provider: Primary Doctor No  Post-Operative Day: 17    ORGAN:   LIVER  Disease Etiology: Alcoholic Cirrhosis  Donor Type:    - Brain Death  CDC High Risk:   No  Donor CMV Status:   Donor CMV Status: Positive  Donor HBcAB:   Negative  Donor HCV Status:   Negative  Donor HBV JAVIER: Negative  Donor HCV JAVIER: Negative  Whole or Partial: Whole Liver  Biliary Anastomosis: End to End  Arterial Anatomy: Standard  Subjective:     History of Present Illness:  Femi Enciso is a 53yr old male with PMH of acute ETOH hepatitis and DEL/ATN evolved to ESRD requiring HD (not candidate for KTX). He is now s/p liver transplant (SM induction, DBD, CMV D+/R-). Transplant surgery noted severe collateralization, adrenal gland firmly adhered to liver. Bare area of adrenal gland required several stitches and packing to obtain fair hemostasis (EBL 15L). OR take back  for bleeding from R adrenal bed in AM (significant clot in retroperitoneum, posterior to R hepatic lobe, tract posterior to the R kidney containing significant clot, small bleeding area of retroperitoneal fat) then returned to surgery same day for hemorrhaging closed with wound vac with plans to return to OR 24-48 hours for closure (retroperitoneal /retrorenal/retrocolic hematoma on the right . Raw retroperitoneal bleeding. Suture ligatures placed, argon beam cautery, evarest placed in retroperitoneum behind cava and right kidney. Significant oozing from upper right adrenal gland, not amenable to ligation, that portion of the adrenal gland was resected with cautery). OR take back  for washout and incisional closure, no significant bleeding or hematoma found. OR cultures   from body fluid grew enterococcus faecium VRE. ID was consulted,  started on  daptomycin for VRE treatment and cefepime/flagyl to cover for IA ty in bile. Patient with significant leukocytosis with peak on 11/22 at 48K prompting drainage of R subphrenic fluid collection, perc drain placed, culture grew VRE. Stroke code called 11/21 for lethargy and unresponsive, CT head without evidence of acute ischemic changes and CTA chest negative, vascular neurology was consulted recommended MRI brain, but patient's AMS improved shortly after event. Nephrology consulted for ESRD requiring HD. Patient overall improved on antibiotic regimen, leukocytosis and AMS improved. He was transferred to TSU on POD #15.     Hospital Course:  Interval History:  No acute events overnight. Pt reports feeling well and more alert this am. LFTs slowly improving. Pt remains with 2 drains in place. Cont with antibxs as per ID recs at this time. Repeat abdominal CT reviewed and collection noted with improvement. Will cont with drain for now and f/u on ID final antibxs recs. Nephrology also consulted as pt remains HD dependent. HD performed 11/27 w/ 2L off. Pt tolerated well. Encourage ambulation as pt remains significantly debilitated. Monitor.       Scheduled Meds:   sodium chloride 0.9%   Intravenous Once    ceFEPime (MAXIPIME) IVPB  1 g Intravenous Q12H    DAPTOmycin (CUBICIN)  IV  10 mg/kg Intravenous Q24H    famotidine  20 mg Oral QHS    heparin (porcine)  5,000 Units Subcutaneous Q8H    isavuconazonium sulfate  372 mg Oral Daily    metroNIDAZOLE  500 mg Oral Q8H    predniSONE  15 mg Oral Daily    Followed by    [START ON 12/3/2018] predniSONE  10 mg Oral Daily    Followed by    [START ON 12/10/2018] predniSONE  5 mg Oral Daily    Followed by    [START ON 12/17/2018] predniSONE  2.5 mg Oral Daily    QUEtiapine  50 mg Oral QHS    sulfamethoxazole-trimethoprim 400-80mg  1 tablet Oral Twice Weekly    tacrolimus  3 mg Sublingual BID    ursodiol  300 mg Oral BID    valganciclovir 50 mg/ml  100 mg Oral Once  per day on Mon Wed Fri     Continuous Infusions:  PRN Meds:sodium chloride 0.9%, albuterol-ipratropium, dextrose 50%, dextrose 50%, glucagon (human recombinant), glucose, glucose, heparin (porcine), HYDROmorphone, insulin aspart U-100, ondansetron, oxyCODONE, oxyCODONE, ramelteon    Review of Systems   Constitutional: Positive for appetite change and fatigue. Negative for activity change and fever.   HENT: Negative.  Negative for facial swelling.    Eyes: Negative.    Respiratory: Negative.  Negative for apnea, chest tightness, shortness of breath and wheezing.    Cardiovascular: Negative.  Negative for chest pain, palpitations and leg swelling.   Gastrointestinal: Positive for abdominal distention. Negative for abdominal pain, constipation, diarrhea, nausea and vomiting.   Genitourinary: Negative.  Negative for decreased urine volume, difficulty urinating, dysuria, hematuria and urgency.   Musculoskeletal: Negative.  Negative for back pain, gait problem, neck pain and neck stiffness.   Skin: Positive for wound. Negative for color change and pallor.   Allergic/Immunologic: Positive for immunocompromised state.   Neurological: Positive for weakness. Negative for dizziness, seizures, light-headedness and headaches.   Psychiatric/Behavioral: Negative.  Negative for behavioral problems, confusion, hallucinations, sleep disturbance and suicidal ideas. The patient is not nervous/anxious.      Objective:     Vital Signs (Most Recent):  Temp: 98.5 °F (36.9 °C) (11/28/18 0715)  Pulse: 107 (11/28/18 1115)  Resp: 20 (11/28/18 1115)  BP: 95/70 (11/28/18 1115)  SpO2: 98 % (11/28/18 1115) Vital Signs (24h Range):  Temp:  [98.5 °F (36.9 °C)-98.6 °F (37 °C)] 98.5 °F (36.9 °C)  Pulse:  [] 107  Resp:  [17-24] 20  SpO2:  [96 %-100 %] 98 %  BP: ()/(67-87) 95/70     Weight: 83.1 kg (183 lb 3.2 oz)  Body mass index is 26.29 kg/m².    Intake/Output - Last 3 Shifts       11/26 0700 - 11/27 0659 11/27 0700 - 11/28 0659 11/28  0700 - 11/29 0659    P.O. 600 1100 200    I.V. (mL/kg)  0 (0) 0 (0)    Other  350 0    IV Piggyback 300      Total Intake(mL/kg) 900 (10.5) 1450 (17.4) 200 (2.4)    Urine (mL/kg/hr) 0 (0) 150 (0.1) 75 (0.2)    Emesis/NG output 0      Drains 2280 980 260    Other 0 2050     Stool 0 0 0    Blood 0      Total Output 2280 3180 335    Net -1380 -1730 -135           Urine Occurrence 0 x 0 x 0 x    Stool Occurrence 1 x 1 x 0 x    Emesis Occurrence 0 x            Physical Exam   Constitutional: He is oriented to person, place, and time. He appears well-developed. No distress.   HENT:   Head: Normocephalic and atraumatic.   Neck: Normal range of motion. Neck supple. No JVD present.   Cardiovascular: Normal rate, regular rhythm and normal heart sounds.   No murmur heard.  Pulmonary/Chest: Effort normal. No respiratory distress. He has decreased breath sounds in the right lower field and the left lower field. He has no wheezes. He exhibits no tenderness.   Abdominal: Soft. Bowel sounds are normal. He exhibits no distension. There is tenderness.   Chevron inc ECTOR w/ staples  RLQ JOSE A drain and percutaneous drain present.    Musculoskeletal: Normal range of motion. He exhibits edema. He exhibits no tenderness.   Neurological: He is alert and oriented to person, place, and time. He has normal reflexes.   Skin: Skin is warm and dry. He is not diaphoretic.   Psychiatric: He has a normal mood and affect. His behavior is normal. Judgment and thought content normal.   Nursing note and vitals reviewed.      Laboratory:  Immunosuppressants         Stop Route Frequency     tacrolimus capsule 3 mg      -- SL 2 times daily        CBC:   Recent Labs   Lab 11/28/18  0631   WBC 18.24*   RBC 2.66*   HGB 8.0*   HCT 24.5*      MCV 92   MCH 30.1   MCHC 32.7     CMP:   Recent Labs   Lab 11/28/18  0631   GLU 89   CALCIUM 7.8*   ALBUMIN 1.9*   PROT 4.2*      K 3.9   CO2 25      BUN 31*   CREATININE 2.2*   ALKPHOS 341*   ALT 57*    AST 47*   BILITOT 4.9*     Coagulation:   Recent Labs   Lab 11/28/18  0632   INR 1.2   APTT 30.1     Labs within the past 24 hours have been reviewed.    Diagnostic Results:  None    Assessment/Plan:     * S/P liver transplant    53 year old male with history of ESLD 2/2 ETOH cirrhosis who is s/p liver transplant c/b take-back x 3 for bleeding most recently 11/18.  - LFTs now improving slowly.   - Pt remains with significant debility. Pt will need SNF placement when medically stable.   - Appetite now improving and encourage PO intake.   - Recently with intra-abdominal abscess requiring drain placement on 11/22.   - Fluid from collection noted with enterococcus VRE. Cont with antibxs.   - Pain well controlled, and having BMs.    - HD per nephrology. Last HD on 11/27.        Leucocytosis    - See intra-abdominal abscess.        Intra-abdominal abscess    - Significant increase in WBC post-op.   - OR cultures from 11/18 noted with enterococcus VRE.   - Abdominal CT performed at that time with fluid collection and drain placed on 11/22 for drainage.   - Intra-abdominal abscess culture also with enterococcus VRE.   - ID consulted and pt placed on antibxs for treatment. Cont with Cefepime, Daptomycin, and Fluconazole for treatment.   - Pt remains with RLQ drains (one JOSE A and one Percutaneous).   - WBC now improving daily.   - Liver u/s on 11/26 reviewed.   - Abdominal CT performed 11/27 and reviewed. Fluid collection noted with improvement on CT.   - Will cont to f/u on final antibxs recs from ID.      Weakness    - Pt remains significantly debilitated.   - PT/OT for strengthening.   - Currently will need SNF for placement and further rehabilitation when medically stable.        Acute renal failure with acute tubular necrosis superimposed on stage 3 chronic kidney disease    - Renal function slow to recover post-op.   - Nephrology consulted for management.   - Cont with HD as per nephrology recs.   - Last HD on 11/27 w/  2L off. Pt tolerated well.    - UOP with slight increase and may consider lasix challenge as per nephrology recs.      Anemia of chronic disease    - H&H stable. Monitor.        At risk for opportunistic infections    - Cont with bactrim and valcyte for protection against opportunistic infections.      Delirium    - Pt with intermittent confusion since transplant but has now improved slowly.   - AAOx3. More alert and awake on 11/27.   - Cont with Seroquel nightly   - Monitor closely.   - Of note, pt with some lethargy and confusion on 11/21. Head CT negative.      Long-term use of immunosuppressant medication    - Cont with prograf. Draw prograf level daily and adjust dose as needed to maintain a therapeutic level.        Prophylactic immunotherapy    - See long term immunosuppression.        Type 2 diabetes mellitus without complication    - Endocrine consulted for management. Appreciate recs.        DEL (acute kidney injury)    - DEL over past 2 weeks. Nephrology was consulted.  Pt not a candidate for kidney transplant before 12-4-18.    - pt tolerating HD at this time. Last HD on 11/27 w/ 2L off.     - cont strict I/O's.    - UOP with slight increase and will need to monitor closely for recovery.   - Nephrology to attempt lasix challenge.          VTE Risk Mitigation (From admission, onward)        Ordered     heparin (porcine) injection 1,000 Units  As needed (PRN)      11/25/18 1428     heparin (porcine) injection 5,000 Units  Every 8 hours      11/11/18 1208     IP VTE HIGH RISK PATIENT  Once      11/11/18 1208          The patients clinical status was discussed at multidisplinary rounds, involving transplant surgery, transplant medicine, pharmacy, nursing, nutrition, and social work    Discharge Planning:  Not a candidate for d/c at this time.     Kevin Miranda, NP  Liver Transplant  [facility]

## 2018-11-28 NOTE — SUBJECTIVE & OBJECTIVE
Interval History: beginning to make some urine, 220s ccs +/- over past 24hrs    Review of patient's allergies indicates:   Allergen Reactions    Penicillins Nausea And Vomiting and Rash     Full body rash from cefepime as well     Current Facility-Administered Medications   Medication Frequency    0.9%  NaCl infusion PRN    0.9%  NaCl infusion Once    albuterol-ipratropium 2.5 mg-0.5 mg/3 mL nebulizer solution 3 mL Q4H PRN    ceFEPIme injection 1 g Q12H    DAPTOmycin (CUBICIN) 980 mg in sodium chloride 0.9% IVPB Q24H    dextrose 50% injection 12.5 g PRN    dextrose 50% injection 25 g PRN    famotidine tablet 20 mg QHS    glucagon (human recombinant) injection 1 mg PRN    glucose chewable tablet 16 g PRN    glucose chewable tablet 24 g PRN    heparin (porcine) injection 1,000 Units PRN    heparin (porcine) injection 5,000 Units Q8H    HYDROmorphone injection 0.2 mg Q3H PRN    insulin aspart U-100 pen 0-5 Units QID (AC + HS) PRN    isavuconazonium sulfate Cap 372 mg Daily    lidocaine HCL 10 mg/ml (1%) injection 1 mL Once    metroNIDAZOLE tablet 500 mg Q8H    ondansetron injection 4 mg Q6H PRN    oxyCODONE immediate release tablet 5 mg Q4H PRN    oxyCODONE immediate release tablet Tab 10 mg Q4H PRN    predniSONE tablet 15 mg Daily    Followed by    [START ON 12/3/2018] predniSONE tablet 10 mg Daily    Followed by    [START ON 12/10/2018] predniSONE tablet 5 mg Daily    Followed by    [START ON 12/17/2018] predniSONE tablet 2.5 mg Daily    QUEtiapine tablet 50 mg QHS    ramelteon tablet 8 mg Nightly PRN    sulfamethoxazole-trimethoprim 400-80mg per tablet 1 tablet Twice Weekly    tacrolimus capsule 6 mg BID    ursodiol capsule 300 mg BID    valganciclovir 50 mg/ml oral solution 100 mg Once per day on Mon Wed Fri       Objective:     Vital Signs (Most Recent):  Temp: 98.2 °F (36.8 °C) (11/28/18 1212)  Pulse: 103 (11/28/18 1212)  Resp: 20 (11/28/18 1212)  BP: 118/80 (11/28/18 1212)  SpO2:  99 % (11/28/18 1212)  O2 Device (Oxygen Therapy): room air (11/26/18 1120) Vital Signs (24h Range):  Temp:  [98.2 °F (36.8 °C)-98.6 °F (37 °C)] 98.2 °F (36.8 °C)  Pulse:  [] 103  Resp:  [17-23] 20  SpO2:  [97 %-99 %] 99 %  BP: ()/(67-80) 118/80     Weight: 83.1 kg (183 lb 3.2 oz) (11/28/18 0500)  Body mass index is 26.29 kg/m².  Body surface area is 2.03 meters squared.    I/O last 3 completed shifts:  In: 1690 [P.O.:1340; Other:350]  Out: 3630 [Urine:150; Drains:1430; Other:2050]    Physical Exam   Constitutional: He is oriented to person, place, and time.   HENT:   Head: Atraumatic.   Eyes: EOM are normal.   Neck: No JVD present.   Cardiovascular: Normal rate and regular rhythm. Exam reveals no friction rub.   Pulmonary/Chest: Effort normal and breath sounds normal.   Abdominal: Soft. He exhibits distension.   Musculoskeletal: He exhibits edema.   Neurological: He is alert and oriented to person, place, and time.   Skin: Skin is warm.   Psychiatric: He has a normal mood and affect.

## 2018-11-28 NOTE — PROGRESS NOTES
[facility]  Infectious Disease  Progress Note    Patient Name: Femi Enciso  MRN: [id number]  Admission Date: 10/27/2018  Length of Stay: 32 days  Attending Physician: Femi Patel MD  Primary Care Provider: Primary Doctor No    Isolation Status: Contact  Assessment/Plan:      Delirium    52yo man w/a history of DM2 and alcohol dependence with associated hepatitis who was admitted on 10/27/2018 with acute liver failure (c/b PSE, HRS, hepatic hydrothorax) and ultimately underwent liver transplantation (s/p DBDLT 11/11/2018, CMV D+/R-, steroid induction, on maintenance tacro/MMF/pred; c/b post-operative delirium, VRE bacteruria, and IA hemorrhage requiring re-do ex-lap, RP/retrorenal/retrocolic hematoma evacuation, cauterization of RP bleed, partial right adrenalectomy, and temporary vac abdominal closure on 11/16/2018 and planned benign second look washout on 11/18/2018). ID was consulted on 11/18 for VRE.faecium infected IA hematoma (with growth from washout cultures). His AMS, leukocytosis, and bilirubin have improved with expanded coverage of dapto/cefepime/flagyl and additional percutaneous drain insertion on 11/22. Repeat scan on 11/28 is very reassuring and will transition to oral agents today.    - would stop daptomycin and transition to linezolid 600mg PO q12h for an additional 10 day chaser (stop date: 12/8/2018)  - would stop empiric cefepime/flagyl -- has completed over a week and scan reassuring/no growth of GNR/anaerobes in cx  - would continue posaconazole for mold prophylaxis per institutional protocol (risk factor: hemorrhage/washout)  - remainder of prophylaxis per protocol  - follow-up per primary team         Anticipated Disposition: per primary team    Thank you for your consult. I will sign off. Please contact us if you have any additional questions.     Rosalee Ireland MD  Transplant ID Attending  [id number]    Rosalee Ireland MD  Infectious Disease  [facility]    Subjective:     Principal Problem:S/P liver transplant    HPI: No notes on file  Interval History: Doing well today. Afebrile. WBC/bili continue to improve. CT A/P reassuring that drainage complete/infection resolved nearly.    Review of Systems   Constitutional: Negative for activity change, appetite change, chills, fatigue and fever.   HENT: Negative for congestion, ear pain, rhinorrhea and sore throat.    Eyes: Negative for pain, discharge and visual disturbance.   Respiratory: Negative for cough, chest tightness and shortness of breath.    Cardiovascular: Negative for chest pain and palpitations.   Gastrointestinal: Negative for abdominal distention, abdominal pain, blood in stool, constipation, diarrhea, nausea and vomiting.   Genitourinary: Negative for difficulty urinating.   Musculoskeletal: Negative for back pain and neck pain.   Skin: Negative for color change and rash.   Neurological: Negative for dizziness, numbness and headaches.   Hematological: Negative for adenopathy.   Psychiatric/Behavioral: Negative for behavioral problems and confusion.   All other systems reviewed and are negative.    Objective:     Vital Signs (Most Recent):  Temp: 98.3 °F (36.8 °C) (11/28/18 1708)  Pulse: 103 (11/28/18 1212)  Resp: 20 (11/28/18 1212)  BP: 118/80 (11/28/18 1212)  SpO2: 99 % (11/28/18 1212) Vital Signs (24h Range):  Temp:  [98.2 °F (36.8 °C)-98.6 °F (37 °C)] 98.3 °F (36.8 °C)  Pulse:  [] 103  Resp:  [17-23] 20  SpO2:  [97 %-99 %] 99 %  BP: ()/(67-80) 118/80     Weight: 83.1 kg (183 lb 3.2 oz)  Body mass index is 26.29 kg/m².    Estimated Creatinine Clearance: 40.1 mL/min (A) (based on SCr of 2.2 mg/dL (H)).    Physical Exam   Constitutional: He appears well-developed. No distress.   HENT:   Head: Atraumatic.   Mouth/Throat: Oropharynx is clear and moist. No oropharyngeal exudate.   Eyes: Conjunctivae and EOM are normal. Pupils are equal, round, and reactive to light. No scleral  icterus.   Neck: Neck supple.   Cardiovascular: Normal rate and regular rhythm. Exam reveals no friction rub.   No murmur heard.  Pulmonary/Chest: Breath sounds normal. No respiratory distress. He has no wheezes. He has no rales. He exhibits no tenderness.   Abdominal: Soft. Bowel sounds are normal. He exhibits distension. There is no tenderness. There is no rebound and no guarding.   SS fluid in both drains.   Musculoskeletal: Normal range of motion. He exhibits edema.   Lymphadenopathy:     He has no cervical adenopathy.   Neurological: He is alert.   Skin: No rash noted. No erythema.       Significant Labs:   CBC:   Recent Labs   Lab 11/27/18  0612 11/28/18  0631   WBC 19.43* 18.24*   HGB 8.6* 8.0*   HCT 27.1* 24.5*    231     CMP:   Recent Labs   Lab 11/27/18  0612 11/28/18  0631    138   K 4.5 3.9    107   CO2 22* 25   GLU 89 89   BUN 46* 31*   CREATININE 2.6* 2.2*   CALCIUM 7.8* 7.8*   PROT 4.0* 4.2*   ALBUMIN 1.7* 1.9*   BILITOT 5.4* 4.9*   ALKPHOS 343* 341*   AST 49* 47*   ALT 64* 57*   ANIONGAP 8 6*   EGFRNONAA 26.9* 33.0*       Significant Imaging: I have reviewed all pertinent imaging results/findings within the past 24 hours.

## 2018-11-28 NOTE — PROGRESS NOTES
EDUCATION NOTE:    Met with Femi Enciso and his caregivers to provide teaching re: immunosuppressant medications.  Reviewed medication section of the Liver Transplant Education book that was provided.  Emphasized the importance of compliance, role of the blue medication card, concerns for drug interactions, and process of obtaining refills.  Counseled regarding Prograf, Cellcept , prednisone, including directions for use, monitoring, how to handle missed doses, and side effects.  Mr Poole caregivers verbalized understanding and had the opportunity to ask questions.

## 2018-11-28 NOTE — PROCEDURES
Drain removed:  Site cleaned with alcohol, lidocaine 1% used to numb area to place prolene 3.0 suture to site.  Drain intact at time of removal.  Patient tolerated procedure well.  Will continue to monitor for any complications.

## 2018-11-28 NOTE — ASSESSMENT & PLAN NOTE
- Significant increase in WBC post-op.   - OR cultures from 11/18 noted with enterococcus VRE.   - Abdominal CT performed at that time with fluid collection and drain placed on 11/22 for drainage.   - Intra-abdominal abscess culture also with enterococcus VRE.   - ID consulted and pt placed on antibxs for treatment. Pt was on Cefepime, Daptomycin, and Fluconazole for treatment.    - Pt remains with RLQ drains (one JOSE A and one Percutaneous).  One JOSE A removed 11/28.  Perc drain in placed draining tan, clear drainage.  WBC slowly improving.   - Liver US on 11/26 reviewed.   - Abdominal CT performed 11/27 and reviewed. Fluid collection noted with improvement on CT.  ID following and reassured by findings.    - Dapto, Cefepime and Flagyl changed to Linezolid 600 mg PO q 12 hr x 10 days per ID.

## 2018-11-28 NOTE — ASSESSMENT & PLAN NOTE
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   - s/p OHLTx 11/11/2018; intra-op SLED  - remaining anuric, but clearance numbers look ok, on SLED overnight  -hemodynamically stable, off pressors    -increasing urine output, also maybe some concern for retention based on inabilty and resistance on attempted in & out catherization two days ago    Plan:  -hold on RRT today  -recommend attempting another in & out catherization to r/o obstructive component, if unable, then urology consult  -challenge with high dose IV furosemide 120-160

## 2018-11-28 NOTE — PROGRESS NOTES
Met with patient, wife and mother for  discharge teaching.  Reviewed My New Journey: Living Smart After My Liver Transplant.  Sections reviewed were: First Steps, Appointments and Prevention.  Medication section  will be reviewed by Pharm D.  Allowed time for questions and answers.  Asked patient to complete the review questions in the discharge book.

## 2018-11-28 NOTE — ASSESSMENT & PLAN NOTE
- DEL for over 2 weeks prior to transplant. Nephrology was consulted.     - Post transplant, Nephrology cont to follow.  He received HD last on 11/27 w/ 2L off.     - Attempted Lasix challenge (160 mg) x1 on 11/28- pt diuresed 105 cc.    - Cont strict I/O's.  Monitor closely.

## 2018-11-28 NOTE — ASSESSMENT & PLAN NOTE
- 53 year old male with history of ESLD 2/2 ETOH cirrhosis who is s/p liver transplant c/b take-back x 3 for bleeding most recently 11/18.  - LFTs now improving slowly.  T bili was 37.6 day of transplant.  - Pt remains with significant debility. Pt will need SNF placement when medically stable.   - Appetite slowly improving.  Tolerating supplements.  Encourage PO intake.   - Drain placed during take back. Drain placed to intra-abdominal abscess on 11/22.   - Fluid from collection noted with enterococcus VRE. Cont with antibxs per ID.  De escalated to PO on 11/29/18.    - Pain well controlled, and having BMs.    - HD per nephrology. Last HD on 11/27.

## 2018-11-28 NOTE — PROGRESS NOTES
Call received from Kevin Miranda NP to hold off on giving ordered scheduled lasix 160 mg until bladder scan completed and reported.    Pt bladder scanned.  Unable to obtain accurate scan due to ascites.  Noted 300 cc to 500 cc fluid on scan.  NP notified.  Per NP orders given to straight cath pt and report to NP amount obtained.    Straight cath performed on pt.  30 cc dark brown urine noted.  NP notified.  Orders given from NP to go ahead and give lasix as ordered. No other orders given at this point.      Will carry out orders and monitor.

## 2018-11-28 NOTE — ASSESSMENT & PLAN NOTE
52yo man w/a history of DM2 and alcohol dependence with associated hepatitis who was admitted on 10/27/2018 with acute liver failure (c/b PSE, HRS, hepatic hydrothorax) and ultimately underwent liver transplantation (s/p DBDLT 11/11/2018, CMV D+/R-, steroid induction, on maintenance tacro/MMF/pred; c/b post-operative delirium, VRE bacteruria, and IA hemorrhage requiring re-do ex-lap, RP/retrorenal/retrocolic hematoma evacuation, cauterization of RP bleed, partial right adrenalectomy, and temporary vac abdominal closure on 11/16/2018 and planned benign second look washout on 11/18/2018). ID was consulted on 11/18 for VRE.faecium infected IA hematoma (with growth from washout cultures). His AMS, leukocytosis, and bilirubin have improved with expanded coverage of dapto/cefepime/flagyl and additional percutaneous drain insertion on 11/22. Repeat scan on 11/28 is very reassuring and will transition to oral agents today.    - would stop daptomycin and transition to linezolid 600mg PO q12h for an additional 10 day chaser (stop date: 12/8/2018)  - would stop empiric cefepime/flagyl -- has completed over a week and scan reassuring/no growth of GNR/anaerobes in cx  - would continue posaconazole for mold prophylaxis per institutional protocol (risk factor: hemorrhage/washout)  - remainder of prophylaxis per protocol  - follow-up per primary team

## 2018-11-28 NOTE — SUBJECTIVE & OBJECTIVE
Interval History: Doing well today. Afebrile. WBC/bili continue to improve. CT A/P reassuring that drainage complete/infection resolved nearly.    Review of Systems   Constitutional: Negative for activity change, appetite change, chills, fatigue and fever.   HENT: Negative for congestion, ear pain, rhinorrhea and sore throat.    Eyes: Negative for pain, discharge and visual disturbance.   Respiratory: Negative for cough, chest tightness and shortness of breath.    Cardiovascular: Negative for chest pain and palpitations.   Gastrointestinal: Negative for abdominal distention, abdominal pain, blood in stool, constipation, diarrhea, nausea and vomiting.   Genitourinary: Negative for difficulty urinating.   Musculoskeletal: Negative for back pain and neck pain.   Skin: Negative for color change and rash.   Neurological: Negative for dizziness, numbness and headaches.   Hematological: Negative for adenopathy.   Psychiatric/Behavioral: Negative for behavioral problems and confusion.   All other systems reviewed and are negative.    Objective:     Vital Signs (Most Recent):  Temp: 98.3 °F (36.8 °C) (11/28/18 1708)  Pulse: 103 (11/28/18 1212)  Resp: 20 (11/28/18 1212)  BP: 118/80 (11/28/18 1212)  SpO2: 99 % (11/28/18 1212) Vital Signs (24h Range):  Temp:  [98.2 °F (36.8 °C)-98.6 °F (37 °C)] 98.3 °F (36.8 °C)  Pulse:  [] 103  Resp:  [17-23] 20  SpO2:  [97 %-99 %] 99 %  BP: ()/(67-80) 118/80     Weight: 83.1 kg (183 lb 3.2 oz)  Body mass index is 26.29 kg/m².    Estimated Creatinine Clearance: 40.1 mL/min (A) (based on SCr of 2.2 mg/dL (H)).    Physical Exam   Constitutional: He appears well-developed. No distress.   HENT:   Head: Atraumatic.   Mouth/Throat: Oropharynx is clear and moist. No oropharyngeal exudate.   Eyes: Conjunctivae and EOM are normal. Pupils are equal, round, and reactive to light. No scleral icterus.   Neck: Neck supple.   Cardiovascular: Normal rate and regular rhythm. Exam reveals no  friction rub.   No murmur heard.  Pulmonary/Chest: Breath sounds normal. No respiratory distress. He has no wheezes. He has no rales. He exhibits no tenderness.   Abdominal: Soft. Bowel sounds are normal. He exhibits distension. There is no tenderness. There is no rebound and no guarding.   SS fluid in both drains.   Musculoskeletal: Normal range of motion. He exhibits edema.   Lymphadenopathy:     He has no cervical adenopathy.   Neurological: He is alert.   Skin: No rash noted. No erythema.       Significant Labs:   CBC:   Recent Labs   Lab 11/27/18  0612 11/28/18  0631   WBC 19.43* 18.24*   HGB 8.6* 8.0*   HCT 27.1* 24.5*    231     CMP:   Recent Labs   Lab 11/27/18 0612 11/28/18  0631    138   K 4.5 3.9    107   CO2 22* 25   GLU 89 89   BUN 46* 31*   CREATININE 2.6* 2.2*   CALCIUM 7.8* 7.8*   PROT 4.0* 4.2*   ALBUMIN 1.7* 1.9*   BILITOT 5.4* 4.9*   ALKPHOS 343* 341*   AST 49* 47*   ALT 64* 57*   ANIONGAP 8 6*   EGFRNONAA 26.9* 33.0*       Significant Imaging: I have reviewed all pertinent imaging results/findings within the past 24 hours.

## 2018-11-28 NOTE — PT/OT/SLP PROGRESS
Speech Language Pathology Treatment    Patient Name:  Femi Enciso   MRN:  [id number]  Admitting Diagnosis: S/P liver transplant    Recommendations:                 General Recommendations:  Follow-up not indicated  Diet recommendations:  Regular, Liquid Diet Level: Thin   Aspiration Precautions: Standard aspiration precautions   General Precautions: Standard, contact, fall  Communication strategies:  none    Subjective     Awake/alert    Pain/Comfort:  · Pain Rating 1: 0/10  · Pain Rating Post-Intervention 1: 0/10    Objective:     Has the patient been evaluated by SLP for swallowing?   Yes  Keep patient NPO? No   Current Respiratory Status: room air      Pt upright in bed finishing breakfast upon entry. Pt tolerated saltine cracker x1 and thin liquids via cup edge x4 with timely swallow initiation and adequate oral clearance. No overt clinical signs of airway compromise noted throughout trials. Pt denies any difficulty with PO intake at this time. SLP reviewed swallow precautions with pt who verbalized understanding. Recommend regular diet/thin liquids at this time. No further ST recommended at this time.     Assessment:     Femi Enciso is a 53 y.o. male with oral/pharyngeal phases of swallow deemed WFL.     Goals:   Multidisciplinary Problems     SLP Goals        Problem: SLP Goal    Goal Priority Disciplines Outcome   SLP Goal     SLP Ongoing (interventions implemented as appropriate)   Description:  Speech Language Pathology Goals  Goals expected to be met by 11/30/2018  1. Pt will participate in ongoing swallow assessment MET  2. Pt will tolerate regular diet with thin liquids, MOD I MET  3. Educate Pt and family on S/S aspiration                            Plan:     · Plan of Care reviewed with:  patient, family   · SLP Follow-Up:  No       Discharge recommendations: no further ST recommended.       Time Tracking:     SLP Treatment Date:   11/28/18  Speech Start Time:  0820  Speech Stop Time:  0828     Speech  Total Time (min):  8 min    Billable Minutes: Treatment Swallowing Dysfunction 8    So Sunshine CCC-SLP  11/28/2018

## 2018-11-29 ENCOUNTER — CONFERENCE (OUTPATIENT)
Dept: TRANSPLANT | Facility: CLINIC | Age: 53
End: 2018-11-29

## 2018-11-29 PROBLEM — N30.00 ACUTE CYSTITIS WITHOUT HEMATURIA: Status: RESOLVED | Noted: 2018-10-27 | Resolved: 2018-11-29

## 2018-11-29 PROBLEM — J90 PLEURAL EFFUSION: Status: ACTIVE | Noted: 2018-10-27

## 2018-11-29 LAB
ABO + RH BLD: NORMAL
ALBUMIN SERPL BCP-MCNC: 1.6 G/DL
ALP SERPL-CCNC: 359 U/L
ALT SERPL W/O P-5'-P-CCNC: 49 U/L
ANION GAP SERPL CALC-SCNC: 10 MMOL/L
ANISOCYTOSIS BLD QL SMEAR: SLIGHT
APTT BLDCRRT: 30.9 SEC
AST SERPL-CCNC: 43 U/L
BASOPHILS # BLD AUTO: 0.1 K/UL
BASOPHILS NFR BLD: 0.5 %
BILIRUB SERPL-MCNC: 3.8 MG/DL
BLD GP AB SCN CELLS X3 SERPL QL: NORMAL
BUN SERPL-MCNC: 43 MG/DL
CALCIUM SERPL-MCNC: 7.5 MG/DL
CHLORIDE SERPL-SCNC: 106 MMOL/L
CO2 SERPL-SCNC: 19 MMOL/L
CREAT SERPL-MCNC: 2.9 MG/DL
DIFFERENTIAL METHOD: ABNORMAL
EOSINOPHIL # BLD AUTO: 1.3 K/UL
EOSINOPHIL NFR BLD: 6.9 %
ERYTHROCYTE [DISTWIDTH] IN BLOOD BY AUTOMATED COUNT: 19.2 %
EST. GFR  (AFRICAN AMERICAN): 27.3 ML/MIN/1.73 M^2
EST. GFR  (NON AFRICAN AMERICAN): 23.6 ML/MIN/1.73 M^2
GLUCOSE SERPL-MCNC: 80 MG/DL
HCT VFR BLD AUTO: 23.1 %
HCT VFR BLD AUTO: 24.8 %
HGB BLD-MCNC: 7.6 G/DL
HGB BLD-MCNC: 8 G/DL
HYPOCHROMIA BLD QL SMEAR: ABNORMAL
IMM GRANULOCYTES # BLD AUTO: 0.26 K/UL
IMM GRANULOCYTES NFR BLD AUTO: 1.4 %
INR PPP: 1.2
LYMPHOCYTES # BLD AUTO: 1 K/UL
LYMPHOCYTES NFR BLD: 5.4 %
MCH RBC QN AUTO: 29.8 PG
MCHC RBC AUTO-ENTMCNC: 32.9 G/DL
MCV RBC AUTO: 91 FL
MONOCYTES # BLD AUTO: 0.4 K/UL
MONOCYTES NFR BLD: 2.4 %
NEUTROPHILS # BLD AUTO: 15.2 K/UL
NEUTROPHILS NFR BLD: 83.4 %
NRBC BLD-RTO: 0 /100 WBC
OVALOCYTES BLD QL SMEAR: ABNORMAL
PHOSPHATE SERPL-MCNC: 3.5 MG/DL
PLATELET # BLD AUTO: 252 K/UL
PMV BLD AUTO: 13.2 FL
POCT GLUCOSE: 193 MG/DL (ref 70–110)
POCT GLUCOSE: 284 MG/DL (ref 70–110)
POCT GLUCOSE: 79 MG/DL (ref 70–110)
POIKILOCYTOSIS BLD QL SMEAR: SLIGHT
POLYCHROMASIA BLD QL SMEAR: ABNORMAL
POTASSIUM SERPL-SCNC: 3.9 MMOL/L
PROT SERPL-MCNC: 4.2 G/DL
PROTHROMBIN TIME: 12.2 SEC
RBC # BLD AUTO: 2.55 M/UL
SODIUM SERPL-SCNC: 135 MMOL/L
TACROLIMUS BLD-MCNC: 9.3 NG/ML
WBC # BLD AUTO: 18.24 K/UL

## 2018-11-29 PROCEDURE — 27000646 HC AEROBIKA DEVICE

## 2018-11-29 PROCEDURE — 25000003 PHARM REV CODE 250: Performed by: NURSE PRACTITIONER

## 2018-11-29 PROCEDURE — 25000242 PHARM REV CODE 250 ALT 637 W/ HCPCS: Performed by: NURSE PRACTITIONER

## 2018-11-29 PROCEDURE — 99233 SBSQ HOSP IP/OBS HIGH 50: CPT | Mod: 24,,, | Performed by: NURSE PRACTITIONER

## 2018-11-29 PROCEDURE — 85610 PROTHROMBIN TIME: CPT

## 2018-11-29 PROCEDURE — 63600175 PHARM REV CODE 636 W HCPCS: Performed by: TRANSPLANT SURGERY

## 2018-11-29 PROCEDURE — 86920 COMPATIBILITY TEST SPIN: CPT

## 2018-11-29 PROCEDURE — 99233 PR SUBSEQUENT HOSPITAL CARE,LEVL III: ICD-10-PCS | Mod: 24,,, | Performed by: NURSE PRACTITIONER

## 2018-11-29 PROCEDURE — 94640 AIRWAY INHALATION TREATMENT: CPT

## 2018-11-29 PROCEDURE — 99900035 HC TECH TIME PER 15 MIN (STAT)

## 2018-11-29 PROCEDURE — 97530 THERAPEUTIC ACTIVITIES: CPT

## 2018-11-29 PROCEDURE — 97535 SELF CARE MNGMENT TRAINING: CPT

## 2018-11-29 PROCEDURE — 99231 PR SUBSEQUENT HOSPITAL CARE,LEVL I: ICD-10-PCS | Mod: GC,,, | Performed by: INTERNAL MEDICINE

## 2018-11-29 PROCEDURE — 94664 DEMO&/EVAL PT USE INHALER: CPT

## 2018-11-29 PROCEDURE — 85025 COMPLETE CBC W/AUTO DIFF WBC: CPT

## 2018-11-29 PROCEDURE — 25000003 PHARM REV CODE 250: Performed by: STUDENT IN AN ORGANIZED HEALTH CARE EDUCATION/TRAINING PROGRAM

## 2018-11-29 PROCEDURE — 80053 COMPREHEN METABOLIC PANEL: CPT

## 2018-11-29 PROCEDURE — 63600175 PHARM REV CODE 636 W HCPCS: Performed by: NURSE PRACTITIONER

## 2018-11-29 PROCEDURE — 84100 ASSAY OF PHOSPHORUS: CPT

## 2018-11-29 PROCEDURE — 99231 SBSQ HOSP IP/OBS SF/LOW 25: CPT | Mod: GC,,, | Performed by: INTERNAL MEDICINE

## 2018-11-29 PROCEDURE — 85018 HEMOGLOBIN: CPT

## 2018-11-29 PROCEDURE — 63600175 PHARM REV CODE 636 W HCPCS: Performed by: STUDENT IN AN ORGANIZED HEALTH CARE EDUCATION/TRAINING PROGRAM

## 2018-11-29 PROCEDURE — 85730 THROMBOPLASTIN TIME PARTIAL: CPT

## 2018-11-29 PROCEDURE — 85014 HEMATOCRIT: CPT

## 2018-11-29 PROCEDURE — 86850 RBC ANTIBODY SCREEN: CPT

## 2018-11-29 PROCEDURE — 97112 NEUROMUSCULAR REEDUCATION: CPT

## 2018-11-29 PROCEDURE — 20600001 HC STEP DOWN PRIVATE ROOM

## 2018-11-29 PROCEDURE — 80197 ASSAY OF TACROLIMUS: CPT

## 2018-11-29 PROCEDURE — 94761 N-INVAS EAR/PLS OXIMETRY MLT: CPT

## 2018-11-29 RX ORDER — SODIUM BICARBONATE 650 MG/1
650 TABLET ORAL 2 TIMES DAILY
Status: DISCONTINUED | OUTPATIENT
Start: 2018-11-29 | End: 2018-12-13

## 2018-11-29 RX ORDER — ERGOCALCIFEROL 1.25 MG/1
50000 CAPSULE ORAL
Status: DISCONTINUED | OUTPATIENT
Start: 2018-11-29 | End: 2019-01-08 | Stop reason: HOSPADM

## 2018-11-29 RX ORDER — LINEZOLID 600 MG/1
600 TABLET, FILM COATED ORAL EVERY 12 HOURS
Status: COMPLETED | OUTPATIENT
Start: 2018-11-29 | End: 2018-12-08

## 2018-11-29 RX ORDER — IPRATROPIUM BROMIDE AND ALBUTEROL SULFATE 2.5; .5 MG/3ML; MG/3ML
3 SOLUTION RESPIRATORY (INHALATION)
Status: DISCONTINUED | OUTPATIENT
Start: 2018-11-29 | End: 2018-12-17

## 2018-11-29 RX ORDER — LIDOCAINE HYDROCHLORIDE 10 MG/ML
1 INJECTION, SOLUTION EPIDURAL; INFILTRATION; INTRACAUDAL; PERINEURAL ONCE
Status: COMPLETED | OUTPATIENT
Start: 2018-11-29 | End: 2018-11-29

## 2018-11-29 RX ADMIN — SULFAMETHOXAZOLE AND TRIMETHOPRIM 1 TABLET: 400; 80 TABLET ORAL at 08:11

## 2018-11-29 RX ADMIN — URSODIOL 300 MG: 300 CAPSULE ORAL at 08:11

## 2018-11-29 RX ADMIN — TACROLIMUS 6 MG: 1 CAPSULE ORAL at 06:11

## 2018-11-29 RX ADMIN — IPRATROPIUM BROMIDE AND ALBUTEROL SULFATE 3 ML: .5; 3 SOLUTION RESPIRATORY (INHALATION) at 05:11

## 2018-11-29 RX ADMIN — FAMOTIDINE 20 MG: 20 TABLET ORAL at 08:11

## 2018-11-29 RX ADMIN — SODIUM BICARBONATE 650 MG TABLET 650 MG: at 11:11

## 2018-11-29 RX ADMIN — SODIUM BICARBONATE 650 MG TABLET 650 MG: at 06:11

## 2018-11-29 RX ADMIN — ISAVUCONAZONIUM SULFATE 372 MG: 186 CAPSULE ORAL at 08:11

## 2018-11-29 RX ADMIN — LINEZOLID 600 MG: 600 TABLET, FILM COATED ORAL at 08:11

## 2018-11-29 RX ADMIN — PREDNISONE 15 MG: 5 TABLET ORAL at 08:11

## 2018-11-29 RX ADMIN — METRONIDAZOLE 500 MG: 500 TABLET ORAL at 05:11

## 2018-11-29 RX ADMIN — OXYCODONE HYDROCHLORIDE 5 MG: 5 TABLET ORAL at 02:11

## 2018-11-29 RX ADMIN — HEPARIN SODIUM 5000 UNITS: 5000 INJECTION, SOLUTION INTRAVENOUS; SUBCUTANEOUS at 01:11

## 2018-11-29 RX ADMIN — ERGOCALCIFEROL 50000 UNITS: 1.25 CAPSULE ORAL at 10:11

## 2018-11-29 RX ADMIN — TRAZODONE HYDROCHLORIDE 25 MG: 50 TABLET ORAL at 08:11

## 2018-11-29 RX ADMIN — LINEZOLID 600 MG: 600 TABLET, FILM COATED ORAL at 10:11

## 2018-11-29 RX ADMIN — HEPARIN SODIUM 5000 UNITS: 5000 INJECTION, SOLUTION INTRAVENOUS; SUBCUTANEOUS at 05:11

## 2018-11-29 RX ADMIN — INSULIN ASPART 1 UNITS: 100 INJECTION, SOLUTION INTRAVENOUS; SUBCUTANEOUS at 09:11

## 2018-11-29 RX ADMIN — IPRATROPIUM BROMIDE AND ALBUTEROL SULFATE 3 ML: .5; 3 SOLUTION RESPIRATORY (INHALATION) at 09:11

## 2018-11-29 RX ADMIN — QUETIAPINE FUMARATE 25 MG: 25 TABLET, FILM COATED ORAL at 08:11

## 2018-11-29 RX ADMIN — LIDOCAINE HYDROCHLORIDE 10 MG: 10 INJECTION, SOLUTION EPIDURAL; INFILTRATION; INTRACAUDAL at 01:11

## 2018-11-29 RX ADMIN — HEPARIN SODIUM 5000 UNITS: 5000 INJECTION, SOLUTION INTRAVENOUS; SUBCUTANEOUS at 08:11

## 2018-11-29 RX ADMIN — TACROLIMUS 6 MG: 1 CAPSULE ORAL at 08:11

## 2018-11-29 RX ADMIN — OXYCODONE HYDROCHLORIDE 5 MG: 5 TABLET ORAL at 11:11

## 2018-11-29 NOTE — PROGRESS NOTES
Pt c/o new onset sharp intermittent pain to R side of back, chest, and abdomen.  Pain worse with deep breathing.  No SOB reported.  Breath sounds diminished on R side.      Hilary Galarza NP notified of the above information.  Orders placed for chest and abdominal xray STAT.  No other orders at this time. Will monitor.

## 2018-11-29 NOTE — PROGRESS NOTES
[facility]  Liver Transplant  Progress Note    Patient Name: Femi Enciso  MRN: [id number]  Admission Date: 10/27/2018  Hospital Length of Stay: 33 days  Code Status: Full Code  Primary Care Provider: Primary Doctor No  Post-Operative Day: 18    ORGAN:   LIVER  Disease Etiology: Alcoholic Cirrhosis  Donor Type:    - Brain Death  CDC High Risk:   No  Donor CMV Status:   Donor CMV Status: Positive  Donor HBcAB:   Negative  Donor HCV Status:   Negative  Donor HBV JAVIER: Negative  Donor HCV JAVIER: Negative  Whole or Partial: Whole Liver  Biliary Anastomosis: End to End  Arterial Anatomy: Standard  Subjective:     History of Present Illness:  Femi Enciso is a 53yr old male with PMH of acute ETOH hepatitis and DEL/ATN evolved to ESRD requiring HD (not candidate for KTX). He is now s/p liver transplant (SM induction, DBD, CMV D+/R-). Transplant surgery noted severe collateralization, adrenal gland firmly adhered to liver. Bare area of adrenal gland required several stitches and packing to obtain fair hemostasis (EBL 15L). OR take back  for bleeding from R adrenal bed in AM (significant clot in retroperitoneum, posterior to R hepatic lobe, tract posterior to the R kidney containing significant clot, small bleeding area of retroperitoneal fat) then returned to surgery same day for hemorrhaging closed with wound vac with plans to return to OR 24-48 hours for closure (retroperitoneal /retrorenal/retrocolic hematoma on the right ). Raw retroperitoneal bleeding. Suture ligatures placed, argon beam cautery, evarest placed in retroperitoneum behind cava and right kidney. Significant oozing from upper right adrenal gland, not amenable to ligation, that portion of the adrenal gland was resected with cautery). OR take back  for washout and incisional closure, no significant bleeding or hematoma found. OR cultures   from body fluid grew enterococcus faecium VRE. ID was consulted,  started on  daptomycin for VRE treatment and cefepime/flagyl to cover for IA ty in bile. Patient with significant leukocytosis with peak on 11/22 at 48K prompting drainage of R subphrenic fluid collection, perc drain placed, culture grew VRE. Stroke code called 11/21 for lethargy and unresponsive, CT head without evidence of acute ischemic changes and CTA chest negative, vascular neurology was consulted recommended MRI brain, but patient's AMS improved shortly after event. Nephrology consulted for ESRD requiring HD. Patient overall improved on antibiotic regimen, leukocytosis and AMS improved. He was transferred to TSU on POD #15.     Hospital Course:  Interval History:  No acute events overnight.  Pt cont to progress.  One drain removed yesterday.  Perc drain to RUQ remains in place- draining bilious drainage.  LFTs slowly improving.  T bili decreased from 4.9 to 3.8.  ID was consulted.   CT C/A/P reviewed from 11/28 and reassuring per ID.  Dapto, Cefepime, and Flagyl changed to Linezolid 600 mg PO q 12 hr for 10 days.  Nephrology following.  HD performed 11/27 w/ 2L off.  Appetite is slowly returning.  He denies N/V.  He is passing flatus.  LBM this am.  PT/OT following. Encourage ambulation as pt remains significantly debilitated. Monitor.     Pt c/o Right sided pain worse w deep breath.  CXR showing increased opacity in the inferior hemothorax on the right side since 11/26/2018.  Pulse ox remains stable at 97-99% on RA.  Cont to monitor.     Procedure Note:  Right perc drain removed.  Site cleansed with betadine.  Lidocaine 1 cc injected SC.  Suture holding JOSE A drain removed.  Line removed without complication, no resistance.  JOSE A drain tip intact.  Prolene 3.0 suture used to close site.  Dressing applied and secured with paper tape.   Patient tolerated procedure w/o complications.      Scheduled Meds:   sodium chloride 0.9%   Intravenous Once    ceFEPime (MAXIPIME) IVPB  1 g Intravenous Q12H    DAPTOmycin (CUBICIN)  IV   10 mg/kg Intravenous Q24H    famotidine  20 mg Oral QHS    heparin (porcine)  5,000 Units Subcutaneous Q8H    isavuconazonium sulfate  372 mg Oral Daily    metroNIDAZOLE  500 mg Oral Q8H    predniSONE  15 mg Oral Daily    Followed by    [START ON 12/3/2018] predniSONE  10 mg Oral Daily    Followed by    [START ON 12/10/2018] predniSONE  5 mg Oral Daily    Followed by    [START ON 12/17/2018] predniSONE  2.5 mg Oral Daily    QUEtiapine  50 mg Oral QHS    sulfamethoxazole-trimethoprim 400-80mg  1 tablet Oral Twice Weekly    tacrolimus  3 mg Sublingual BID    ursodiol  300 mg Oral BID    valganciclovir 50 mg/ml  100 mg Oral Once per day on Mon Wed Fri     Continuous Infusions:  PRN Meds:sodium chloride 0.9%, albuterol-ipratropium, dextrose 50%, dextrose 50%, glucagon (human recombinant), glucose, glucose, heparin (porcine), HYDROmorphone, insulin aspart U-100, ondansetron, oxyCODONE, oxyCODONE, ramelteon    Review of Systems   Constitutional: Positive for appetite change and fatigue. Negative for activity change and fever.   HENT: Negative.  Negative for facial swelling.    Eyes: Negative.    Respiratory: Negative.  Negative for apnea, chest tightness, shortness of breath and wheezing.    Cardiovascular: Negative.  Negative for chest pain, palpitations and leg swelling.   Gastrointestinal: Positive for abdominal distention and abdominal pain. Negative for constipation, diarrhea, nausea and vomiting.   Genitourinary: Negative.  Negative for decreased urine volume, difficulty urinating, dysuria, hematuria and urgency.   Musculoskeletal: Negative.  Negative for back pain, gait problem, neck pain and neck stiffness.   Skin: Positive for wound. Negative for color change and pallor.   Allergic/Immunologic: Positive for immunocompromised state.   Neurological: Positive for weakness. Negative for dizziness, seizures, light-headedness and headaches.   Psychiatric/Behavioral: Negative for behavioral problems,  confusion, decreased concentration, hallucinations, sleep disturbance and suicidal ideas. The patient is not nervous/anxious.      Objective:     Vital Signs (Most Recent):  Temp: 98.5 °F (36.9 °C) (11/28/18 0715)  Pulse: 107 (11/28/18 1115)  Resp: 20 (11/28/18 1115)  BP: 95/70 (11/28/18 1115)  SpO2: 98 % (11/28/18 1115) Vital Signs (24h Range):  Temp:  [98.5 °F (36.9 °C)-98.6 °F (37 °C)] 98.5 °F (36.9 °C)  Pulse:  [] 107  Resp:  [17-24] 20  SpO2:  [96 %-100 %] 98 %  BP: ()/(67-87) 95/70     Weight: 83.1 kg (183 lb 3.2 oz)  Body mass index is 26.29 kg/m².    Intake/Output - Last 3 Shifts       11/26 0700 - 11/27 0659 11/27 0700 - 11/28 0659 11/28 0700 - 11/29 0659    P.O. 600 1100 200    I.V. (mL/kg)  0 (0) 0 (0)    Other  350 0    IV Piggyback 300      Total Intake(mL/kg) 900 (10.5) 1450 (17.4) 200 (2.4)    Urine (mL/kg/hr) 0 (0) 150 (0.1) 75 (0.2)    Emesis/NG output 0      Drains 2280 980 260    Other 0 2050     Stool 0 0 0    Blood 0      Total Output 2280 3180 335    Net -1380 -1730 -135           Urine Occurrence 0 x 0 x 0 x    Stool Occurrence 1 x 1 x 0 x    Emesis Occurrence 0 x            Physical Exam   Constitutional: He is oriented to person, place, and time. He appears well-developed. No distress.   HENT:   Head: Normocephalic and atraumatic.   Neck: Normal range of motion. Neck supple. No JVD present.   Cardiovascular: Normal rate, regular rhythm and normal heart sounds.   No murmur heard.  Pulmonary/Chest: Effort normal. No respiratory distress. He has decreased breath sounds in the right lower field and the left lower field. He has no wheezes. He exhibits no tenderness.   Abdominal: Soft. Bowel sounds are normal. He exhibits no distension. There is tenderness.   Chevron inc ECTOR w/ staples  RLQ JOSE A drain and percutaneous drain present.    Musculoskeletal: Normal range of motion. He exhibits edema. He exhibits no tenderness.   Neurological: He is alert and oriented to person, place, and  time. He has normal reflexes.   Skin: Skin is warm and dry. He is not diaphoretic.   Psychiatric: He has a normal mood and affect. His behavior is normal. Judgment and thought content normal.   Nursing note and vitals reviewed.      Laboratory:  Immunosuppressants         Stop Route Frequency     tacrolimus capsule 3 mg      -- SL 2 times daily        CBC:   Recent Labs   Lab 11/28/18  0631   WBC 18.24*   RBC 2.66*   HGB 8.0*   HCT 24.5*      MCV 92   MCH 30.1   MCHC 32.7     CMP:   Recent Labs   Lab 11/28/18  0631   GLU 89   CALCIUM 7.8*   ALBUMIN 1.9*   PROT 4.2*      K 3.9   CO2 25      BUN 31*   CREATININE 2.2*   ALKPHOS 341*   ALT 57*   AST 47*   BILITOT 4.9*     Coagulation:   Recent Labs   Lab 11/28/18  0632   INR 1.2   APTT 30.1     Tacrolimus Lvl   Date Value Ref Range Status   11/28/2018 6.9 5.0 - 15.0 ng/mL Final     Comment:     Testing performed by Liquid Chromatography-Tandem  Mass Spectrometry (LC-MS/MS).  This test was developed and its performance characteristics  determined by [facility], Department of Pathology  and Laboratory Medicine in a manner consistent with CLIA  requirements. It has not been cleared or approved by the [place]  Food and Drug Administration.  This test is used for clinical   purposes.  It should not be regarded as investigational or for  research.     11/27/2018 5.2 5.0 - 15.0 ng/mL Final     Comment:     Testing performed by Liquid Chromatography-Tandem  Mass Spectrometry (LC-MS/MS).  This test was developed and its performance characteristics  determined by [facility], Department of Pathology  and Laboratory Medicine in a manner consistent with CLIA  requirements. It has not been cleared or approved by the [place]  Food and Drug Administration.  This test is used for clinical   purposes.  It should not be regarded as investigational or for  research.     11/26/2018 5.1 5.0 - 15.0 ng/mL Final     Comment:     Testing performed by Liquid  Chromatography-Tandem  Mass Spectrometry (LC-MS/MS).  This test was developed and its performance characteristics  determined by [facility], Department of Pathology  and Laboratory Medicine in a manner consistent with CLIA  requirements. It has not been cleared or approved by the [place]  Food and Drug Administration.  This test is used for clinical   purposes.  It should not be regarded as investigational or for  research.     11/25/2018 2.9 (L) 5.0 - 15.0 ng/mL Final     Comment:     Testing performed by Liquid Chromatography-Tandem  Mass Spectrometry (LC-MS/MS).  This test was developed and its performance characteristics  determined by [facility], Department of Pathology  and Laboratory Medicine in a manner consistent with CLIA  requirements. It has not been cleared or approved by the [place]  Food and Drug Administration.  This test is used for clinical   purposes.  It should not be regarded as investigational or for  research.         Labs within the past 24 hours have been reviewed.    Diagnostic Results:  None    Assessment/Plan:     * S/P liver transplant    - 53 year old male with history of ESLD 2/2 ETOH cirrhosis who is s/p liver transplant c/b take-back x 3 for bleeding most recently 11/18.  - LFTs now improving slowly.  T bili was 37.6 day of transplant.  - Pt remains with significant debility. Pt will need SNF placement when medically stable.   - Appetite slowly improving.  Tolerating supplements.  Encourage PO intake.   - Drain placed during take back. Drain placed to intra-abdominal abscess on 11/22.   - Fluid from collection noted with enterococcus VRE. Cont with antibxs per ID.  De escalated to PO on 11/29/18.    - Pain well controlled, and having BMs.    - HD per nephrology. Last HD on 11/27.        Acute renal failure with acute tubular necrosis superimposed on stage 3 chronic kidney disease    - Renal function slow to recover post-op.   - Nephrology consulted for management.   -  Cont with HD as per nephrology recs.  Apprec recs.    - Last HD on 11/27 w/ 2L off. Pt tolerated well.    - Lasix 160 mg challenge 11/28- pt diuresed recorded 105 ml and 2 missed.       Intra-abdominal abscess    - Significant increase in WBC post-op.   - OR cultures from 11/18 noted with enterococcus VRE.   - Abdominal CT performed at that time with fluid collection and drain placed on 11/22 for drainage.   - Intra-abdominal abscess culture also with enterococcus VRE.   - ID consulted and pt placed on antibxs for treatment. Pt was on Cefepime, Daptomycin, and Fluconazole for treatment.    - Pt remains with RLQ drains (one JOSE A and one Percutaneous).  One JOSE A removed 11/28.  Perc drain in placed draining tan, clear drainage.  WBC slowly improving.   - Liver US on 11/26 reviewed.   - Abdominal CT performed 11/27 and reviewed. Fluid collection noted with improvement on CT.  ID following and reassured by findings.    - Dapto, Cefepime and Flagyl changed to Linezolid 600 mg PO q 12 hr x 10 days per ID.        Long-term use of immunosuppressant medication    - Cont with prograf. Draw prograf level daily and adjust dose as needed to maintain a therapeutic level.        At risk for opportunistic infections    - Cont with bactrim and valcyte for protection against opportunistic infections.   - cont Isavuconazonium.     Leucocytosis    - See intra-abdominal abscess.        Delirium    - Pt with intermittent confusion since transplant but has now improved slowly.   - Pt with some lethargy and confusion on 11/21. Head CT negative.   - AAOx3. More alert and awake on 11/27.   - AAOx4 on 11/29.  He was happy he could remember what day it is today.  Cont with Seroquel nightly   - Monitor closely.        Prophylactic immunotherapy    - See long term immunosuppression.        Weakness    - Pt remains significantly debilitated.   - PT/OT for strengthening.   - Currently will need SNF for placement and further rehabilitation.         Pleural effusion    - c/o increased pain w deep breath today to right side.  CXR showed increased opacity in the inferior hemothorax on the right side since 11/26/2018.  Pulse ox 97-99% on RA.  Cont to monitor         Type 2 diabetes mellitus without complication    - Endocrine consulted for management. Appreciate recs.        Anemia of chronic disease    - H&H stable. Keep type and screen current. Monitor.        DEL (acute kidney injury)    - DEL for over 2 weeks prior to transplant. Nephrology was consulted.     - Post transplant, Nephrology cont to follow.  He received HD last on 11/27 w/ 2L off.     - Attempted Lasix challenge (160 mg) x1 on 11/28- pt diuresed 105 cc.    - Cont strict I/O's.  Monitor closely.         VTE Risk Mitigation (From admission, onward)        Ordered     heparin (porcine) injection 1,000 Units  As needed (PRN)      11/25/18 1428     heparin (porcine) injection 5,000 Units  Every 8 hours      11/11/18 1208     IP VTE HIGH RISK PATIENT  Once      11/11/18 1208          The patients clinical status was discussed at multidisplinary rounds, involving transplant surgery, transplant medicine, pharmacy, nursing, nutrition, and social work    Discharge Planning:  No plan for discharge at this time.      Hilary Galarza, NP  Liver Transplant  [facility]

## 2018-11-29 NOTE — PLAN OF CARE
Problem: Occupational Therapy Goal  Goal: Occupational Therapy Goal  Goals to be met by: 12/5/18     Patient will increase functional independence with ADLs by performing:    UE Dressing with Supervision.  LE Dressing with Minimal Assistance.  Grooming while standing at sink with Stand-by Assistance.  Toileting from toilet with Minimal Assistance for hygiene and clothing management.   Toilet transfer to toilet with Stand-by Assistance.  Upper extremity  theraband exercise program x  15 reps  with independence.       Outcome: Ongoing (interventions implemented as appropriate)  Pt progressing with goals. Continue with POC.   Julia ladd OT  11/29/2018

## 2018-11-29 NOTE — TELEPHONE ENCOUNTER
Liver Pathology Conference:      Rt. Adrenal Gland: BLOOD CLOT AND THROMBUS WITH INNOCUOUS ADRENAL GLAND    Liver Explant: cirrhosis/cholestasis; ETOH, no malignancy      Post Op Liver Biopsy: some re profusion injury

## 2018-11-29 NOTE — PLAN OF CARE
Problem: Physical Therapy Goal  Goal: Physical Therapy Goal  Goals to be met by: 2018     Patient will increase functional independence with mobility by performin. Supine to sit with Modified [place] -not met  2. Sit to stand transfer with [place] -not met  3. Bed to chair transfer with independence using no AD -not met  4. Gait  x150 feet with Supervision using LRAD. -not met  5. Lower extremity exercise program x20 reps per handout, with independence -not met       Outcome: Ongoing (interventions implemented as appropriate)  Goals reviewed and remain appropriate. Pt progressing towards goals.    Ramandeep Kumar, PT, DPT   2018  [phone]

## 2018-11-29 NOTE — SUBJECTIVE & OBJECTIVE
Interval History: urine ouput tapered down overnight, straight cath negative, no response to 160mg IV furosemide challenge    Review of patient's allergies indicates:   Allergen Reactions    Penicillins Nausea And Vomiting and Rash     Full body rash from cefepime as well     Current Facility-Administered Medications   Medication Frequency    0.9%  NaCl infusion PRN    0.9%  NaCl infusion Once    albuterol-ipratropium 2.5 mg-0.5 mg/3 mL nebulizer solution 3 mL Q4H PRN    dextrose 50% injection 12.5 g PRN    dextrose 50% injection 25 g PRN    ergocalciferol capsule 50,000 Units Q7 Days    famotidine tablet 20 mg QHS    glucagon (human recombinant) injection 1 mg PRN    glucose chewable tablet 16 g PRN    glucose chewable tablet 24 g PRN    heparin (porcine) injection 1,000 Units PRN    heparin (porcine) injection 5,000 Units Q8H    HYDROmorphone injection 0.2 mg Q3H PRN    insulin aspart U-100 pen 0-5 Units QID (AC + HS) PRN    isavuconazonium sulfate Cap 372 mg Daily    lidocaine (PF) 10 mg/ml (1%) injection 10 mg Once    lidocaine HCL 10 mg/ml (1%) injection 1 mL Once    linezolid tablet 600 mg Q12H    ondansetron injection 4 mg Q6H PRN    oxyCODONE immediate release tablet 5 mg Q4H PRN    oxyCODONE immediate release tablet Tab 10 mg Q4H PRN    predniSONE tablet 15 mg Daily    Followed by    [START ON 12/3/2018] predniSONE tablet 10 mg Daily    Followed by    [START ON 12/10/2018] predniSONE tablet 5 mg Daily    Followed by    [START ON 12/17/2018] predniSONE tablet 2.5 mg Daily    QUEtiapine tablet 50 mg QHS    ramelteon tablet 8 mg Nightly PRN    sodium bicarbonate tablet 650 mg BID    sulfamethoxazole-trimethoprim 400-80mg per tablet 1 tablet Twice Weekly    tacrolimus capsule 6 mg BID    ursodiol capsule 300 mg BID    valganciclovir 50 mg/ml oral solution 100 mg Once per day on Mon Wed Fri       Objective:     Vital Signs (Most Recent):  Temp: 98.2 °F (36.8 °C) (11/29/18  1145)  Pulse: 106 (11/29/18 1145)  Resp: (!) 23 (11/29/18 1145)  BP: 101/73 (11/29/18 1145)  SpO2: 97 % (11/29/18 1145)  O2 Device (Oxygen Therapy): room air (11/26/18 1120) Vital Signs (24h Range):  Temp:  [98 °F (36.7 °C)-98.5 °F (36.9 °C)] 98.2 °F (36.8 °C)  Pulse:  [] 106  Resp:  [13-23] 23  SpO2:  [97 %-100 %] 97 %  BP: (101-114)/(72-78) 101/73     Weight: 83.1 kg (183 lb 3.2 oz) (11/29/18 0500)  Body mass index is 26.29 kg/m².  Body surface area is 2.03 meters squared.    I/O last 3 completed shifts:  In: 1140 [P.O.:1040; IV Piggyback:100]  Out: 1225 [Urine:185; Drains:1040]    Physical Exam   Constitutional: He appears well-developed.   HENT:   Head: Normocephalic and atraumatic.   Eyes: EOM are normal.   Neck: No JVD present.   Cardiovascular: Normal rate and regular rhythm. Exam reveals no friction rub.   Pulmonary/Chest: Effort normal and breath sounds normal.   Abdominal: Soft. He exhibits distension.   Musculoskeletal: He exhibits no edema.   Neurological: He is alert.   Skin: Skin is warm.

## 2018-11-29 NOTE — ASSESSMENT & PLAN NOTE
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   - s/p OHLTx 11/11/2018; intra-op SLED  - remaining anuric, but clearance numbers look ok, on SLED overnight  -hemodynamically stable, off pressors    -despite minimal UOP, remaining net negative due to drains, clearance is ok    Plan:  -hold RRT today  -reassess in am, but would anticipate HD

## 2018-11-29 NOTE — PROGRESS NOTES
[facility]  Nephrology  Progress Note    Patient Name: Femi Enciso  MRN: [id number]  Admission Date: 10/27/2018  Hospital Length of Stay: 33 days  Attending Provider: Femi Patel MD   Primary Care Physician: Primary Doctor No  Principal Problem:S/P liver transplant    Subjective:     HPI: 54 y/o man with DM2 presents to the ED with family for liver failure (likely due to EtOH abundant history of drinking - diagnosed in Sept 2018 in [place]).  He reports jaundice, generalized weakness, nausea, diarrhea, and decreased appetite since Sept 2018.  He is from [place], [place], and Dr. Sharma (patients physician) recommended bringing him to hospital for evaluation.  Patient denies any fever, chills, vomiting, chest pain, palpitations, SOB, abdominal pain.      Nephrology consulted for evaluation/management Adri.     Interval History: urine ouput tapered down overnight, straight cath negative, no response to 160mg IV furosemide challenge    Review of patient's allergies indicates:   Allergen Reactions    Penicillins Nausea And Vomiting and Rash     Full body rash from cefepime as well     Current Facility-Administered Medications   Medication Frequency    0.9%  NaCl infusion PRN    0.9%  NaCl infusion Once    albuterol-ipratropium 2.5 mg-0.5 mg/3 mL nebulizer solution 3 mL Q4H PRN    dextrose 50% injection 12.5 g PRN    dextrose 50% injection 25 g PRN    ergocalciferol capsule 50,000 Units Q7 Days    famotidine tablet 20 mg QHS    glucagon (human recombinant) injection 1 mg PRN    glucose chewable tablet 16 g PRN    glucose chewable tablet 24 g PRN    heparin (porcine) injection 1,000 Units PRN    heparin (porcine) injection 5,000 Units Q8H    HYDROmorphone injection 0.2 mg Q3H PRN    insulin aspart U-100 pen 0-5 Units QID (AC + HS) PRN    isavuconazonium sulfate Cap 372 mg Daily    lidocaine (PF) 10 mg/ml (1%) injection 10 mg Once    lidocaine HCL 10 mg/ml (1%) injection 1 mL Once     linezolid tablet 600 mg Q12H    ondansetron injection 4 mg Q6H PRN    oxyCODONE immediate release tablet 5 mg Q4H PRN    oxyCODONE immediate release tablet Tab 10 mg Q4H PRN    predniSONE tablet 15 mg Daily    Followed by    [START ON 12/3/2018] predniSONE tablet 10 mg Daily    Followed by    [START ON 12/10/2018] predniSONE tablet 5 mg Daily    Followed by    [START ON 12/17/2018] predniSONE tablet 2.5 mg Daily    QUEtiapine tablet 50 mg QHS    ramelteon tablet 8 mg Nightly PRN    sodium bicarbonate tablet 650 mg BID    sulfamethoxazole-trimethoprim 400-80mg per tablet 1 tablet Twice Weekly    tacrolimus capsule 6 mg BID    ursodiol capsule 300 mg BID    valganciclovir 50 mg/ml oral solution 100 mg Once per day on Mon Wed Fri       Objective:     Vital Signs (Most Recent):  Temp: 98.2 °F (36.8 °C) (11/29/18 1145)  Pulse: 106 (11/29/18 1145)  Resp: (!) 23 (11/29/18 1145)  BP: 101/73 (11/29/18 1145)  SpO2: 97 % (11/29/18 1145)  O2 Device (Oxygen Therapy): room air (11/26/18 1120) Vital Signs (24h Range):  Temp:  [98 °F (36.7 °C)-98.5 °F (36.9 °C)] 98.2 °F (36.8 °C)  Pulse:  [] 106  Resp:  [13-23] 23  SpO2:  [97 %-100 %] 97 %  BP: (101-114)/(72-78) 101/73     Weight: 83.1 kg (183 lb 3.2 oz) (11/29/18 0500)  Body mass index is 26.29 kg/m².  Body surface area is 2.03 meters squared.    I/O last 3 completed shifts:  In: 1140 [P.O.:1040; IV Piggyback:100]  Out: 1225 [Urine:185; Drains:1040]    Physical Exam   Constitutional: He appears well-developed.   HENT:   Head: Normocephalic and atraumatic.   Eyes: EOM are normal.   Neck: No JVD present.   Cardiovascular: Normal rate and regular rhythm. Exam reveals no friction rub.   Pulmonary/Chest: Effort normal and breath sounds normal.   Abdominal: Soft. He exhibits distension.   Musculoskeletal: He exhibits no edema.   Neurological: He is alert.   Skin: Skin is warm.           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   - s/p OHLTx 11/11/2018; intra-op SLED  - remaining anuric, but clearance numbers look ok, on SLED overnight  -hemodynamically stable, off pressors    -despite minimal UOP, remaining net negative due to drains, clearance is ok    Plan:  -hold RRT today  -reassess in am, but would anticipate HD             Thank you for your consult. I will follow-up with patient. Please contact us if you have any additional questions.    Petar Hoyos MD  Nephrology  [facility]    ATTENDING PHYSICIAN ATTESTATION  I have personally interviewed and examined the patient. I thoroughly reviewed the demographic, clinical, laboratorial and imaging information available in medical records. I agree with the assessment and recommendations provided by the subspecialty resident. Dr. Hoyos was under my supervision.

## 2018-11-29 NOTE — PT/OT/SLP PROGRESS
"Physical Therapy Treatment    Patient Name:  Femi Enciso   MRN:  [id number]    Recommendations:     Discharge Recommendations:  nursing facility, skilled   Discharge Equipment Recommendations: (TBD)   Barriers to discharge: Decreased caregiver support (at current functional level)    Assessment:     Femi Enciso is a 53 y.o. male admitted with a medical diagnosis of S/P liver transplant.  He presents with the following impairments/functional limitations:  weakness, gait instability, impaired endurance, impaired balance, impaired self care skills, impaired functional mobilty, decreased coordination, decreased safety awareness, pain. Pt continues to require increased assist to complete functional mobility. Was able to achieve full upright stand this date x3 trials, but with max assist of 2 needed to do so. Also completed stand pivot to chair with maxAx2. Pt would continue to benefit from skilled acute PT in order to address current deficits and progress functional mobility.     Rehab Prognosis:  good; patient would benefit from acute skilled PT services to address these deficits and reach maximum level of function.      Recent Surgery: Procedure(s) (LRB):  LAPAROTOMY, EXPLORATORY, AFTER LIVER TRANSPLANT, LIKELY  ABDOMINAL CLOSURE (N/A) 11 Days Post-Op    Plan:     During this hospitalization, patient to be seen 4 x/week to address the above listed problems via gait training, therapeutic activities, therapeutic exercises, neuromuscular re-education  · Plan of Care Expires:  12/19/18   Plan of Care Reviewed with: patient, spouse    Subjective     Communicated with RN prior to session.  Patient found supine upon PT entry to room, agreeable to treatment.      Chief Complaint: R back/flank pain   Pain/Comfort:  · Pain Rating 1: (rated pain as "moderate")  · Location - Side 1: Right  · Location 1: (back and flank)  · Pain Addressed 1: Reposition, Distraction    Patients cultural, spiritual, Roman [employer] conflicts given the " current situation: none noted     Objective:     Patient found with: pulse ox (continuous), telemetry, JOSE A drain     General Precautions: Standard, fall, contact   Orthopedic Precautions:N/A   Braces: N/A     Functional Mobility:  · Bed Mobility:     · Supine to Sit: moderate assistance (managing trunk)  · V/c and t/c throughout for technique and sequencing   · Transfers:     · Sit to Stand:  maximal assistance and of 2 persons with no AD, x3 successful reps, x1 failed attempt   · Cues for hand placement, transfer technique, and use of forward momentum   · Bed to Chair: maximal assistance and of 2 persons with  no AD  using  Stand Pivot  · Balance:   · static sitting: SBA    · Dynamic sitting: CGA-Julito   · Static standing: maxAx2      AM-PAC 6 CLICK MOBILITY  Turning over in bed (including adjusting bedclothes, sheets and blankets)?: 2  Sitting down on and standing up from a chair with arms (e.g., wheelchair, bedside commode, etc.): 2  Moving from lying on back to sitting on the side of the bed?: 2  Moving to and from a bed to a chair (including a wheelchair)?: 2  Need to walk in hospital room?: 1  Climbing 3-5 steps with a railing?: 1  Basic Mobility Total Score: 10       Therapeutic Activities and Exercises:  Sitting EOB x~15 min with assist ranging from SBA-Julito depending on task at hand; cues for postural control provided   Static standing x2 trials x~15-20 sec each trial with maxAx2 and with max t/c and v/c for upright posture, increased trunk and hip ext. B knees and feet blocked to prevent buckling and maintain stable RAINE. Increased anxiousness noted when standing requiring cues for pursed lip breathing and slowed rate of breathing.   Extended seated rest breaks required between standing trials.     Patient left up in chair with all lines intact, call button in reach, RN notified and pt's wife and mother present..    GOALS:   Multidisciplinary Problems     Physical Therapy Goals        Problem: Physical  Therapy Goal    Goal Priority Disciplines Outcome Goal Variances Interventions   Physical Therapy Goal     PT, PT/OT Ongoing (interventions implemented as appropriate)     Description:  Goals to be met by: 2018     Patient will increase functional independence with mobility by performin. Supine to sit with Modified [place] -not met  2. Sit to stand transfer with [place] -not met  3. Bed to chair transfer with independence using no AD -not met  4. Gait  x150 feet with Supervision using LRAD. -not met  5. Lower extremity exercise program x20 reps per handout, with independence -not met                Problem: Physical Therapy Goal    Goal Priority Disciplines Outcome Goal Variances Interventions   Physical Therapy Goal     PT, PT/OT                      Time Tracking:     PT Received On: 18  PT Start Time: 1020     PT Stop Time: 1101  PT Total Time (min): 41 min     Billable Minutes: Neuromuscular Re-education 41   (co-tx with OT)    Treatment Type: Treatment  PT/PTA: PT     PTA Visit Number: 0     Ramandeep Kumar, PT, DPT   2018  [phone]

## 2018-11-29 NOTE — ASSESSMENT & PLAN NOTE
- c/o increased pain w deep breath today to right side.  CXR showed increased opacity in the inferior hemothorax on the right side since 11/26/2018.  Pulse ox 97-99% on RA.  Cont to monitor

## 2018-11-29 NOTE — PT/OT/SLP PROGRESS
"Occupational Therapy   Treatment    Name: Femi Enciso  MRN: [id number]  Admitting Diagnosis:  S/P liver transplant  11 Days Post-Op    Recommendations:     Discharge Recommendations: nursing facility, skilled  Discharge Equipment Recommendations:  (TBD- pending progress)  Barriers to discharge:  None    Subjective     Pain/Comfort:  · Pain Rating 1: (" moderate" pain R upper back)    Objective:     Communicated with: RN prior to session.  Patient found with all lines intact, call button in reach and RN notified and pulse ox (continuous), telemetry, JOSE A drain upon OT entry to room.    General Precautions: Standard, contact, fall   Orthopedic Precautions:N/A   Braces: N/A     Occupational Performance:    Bed Mobility:    · Patient completed Rolling/Turning to Right with maximal assistance  · Patient completed Supine to Sit with maximal assistance     Functional Mobility/Transfers:  · Patient completed Sit <> Stand Transfer with maximal assistancex2  with  no assistive device   *Pt completed x3 sit <> stand from EOB requiring blocking of B knees, verbal/tactile cues for hip and trunk extension, and pursed lip breathing.     · Patient completed Bed <> Chair Transfer using Stand Pivot technique with maximal assistancex2 with no assistive device    Sitting EOB:   Pt sat EOB ~15 minutes with SBA for static sitting balance; CGA for dynamic sitting balance    Activities of Daily Living:  · Feeding:  stand by assistance with set-up assistance to drink from cup  · Upper Body Dressing: minimum assistance to milady gown like jacket while seated EOB  · Lower Body Dressing: maximal assistance to doff/milady B socks and shoes    AMPAC 6 Click ADL: 15    Treatment & Education:  -Pt edu on OT role/POC  -Importance of OOB activity with staff assistance ( Encouraged to eat meal seated EOB or UIC)   -Safety during functional t/f and mobility ( hand/foot placement, proper posture, pursed lip breathing)  - White board updated  - Multiple self " care tasks completed-- assistance level noted above  - All questions/concerns answered within OT scope of practice    Patient left up in chair with all lines intact, call button in reach and RN notified  Education:    Assessment:     Femi Enciso is a 53 y.o. male with a medical diagnosis of S/P liver transplant.  He presents with the following performance deficits affecting function are weakness, impaired endurance, impaired self care skills, impaired functional mobilty, gait instability, impaired balance, decreased lower extremity function, edema, decreased safety awareness. Pt demo's improved progress towards goals and motivated to participate with therapy. Pt reach a full standing position multiple times this date and assisted with functional transfer. He would benefit from SNF following d/c to continue to progress towards goals and improve quality of life.     Rehab Prognosis:  Good; patient would benefit from acute skilled OT services to address these deficits and reach maximum level of function.       Plan:     Patient to be seen 4 x/week to address the above listed problems via self-care/home management, therapeutic activities, therapeutic exercises, neuromuscular re-education  · Plan of Care Expires: 12/21/18  · Plan of Care Reviewed with: patient    This Plan of care has been discussed with the patient who was involved in its development and understands and is in agreement with the identified goals and treatment plan    GOALS:   Multidisciplinary Problems     Occupational Therapy Goals        Problem: Occupational Therapy Goal    Goal Priority Disciplines Outcome Interventions   Occupational Therapy Goal     OT, PT/OT Ongoing (interventions implemented as appropriate)    Description:  Goals to be met by: 12/5/18     Patient will increase functional independence with ADLs by performing:    UE Dressing with Supervision.  LE Dressing with Minimal Assistance.  Grooming while standing at sink with Stand-by  Assistance.  Toileting from toilet with Minimal Assistance for hygiene and clothing management.   Toilet transfer to toilet with Stand-by Assistance.  Upper extremity  theraband exercise program x  15 reps  with independence.               Problem: Occupational Therapy Goal    Goal Priority Disciplines Outcome Interventions   Occupational Therapy Goal     OT, PT/OT Ongoing (interventions implemented as appropriate)                    Time Tracking:     OT Date of Treatment: 11/29/18  OT Start Time: 1030  OT Stop Time: 1059  OT Total Time (min): 29 min  Co-treat with PT  Billable Minutes:Self Care/Home Management 18  Therapeutic Activity 11    Julia ladd OT  11/29/2018

## 2018-11-30 PROBLEM — E44.0 MODERATE MALNUTRITION: Status: ACTIVE | Noted: 2018-11-30

## 2018-11-30 LAB
ALBUMIN SERPL BCP-MCNC: 1.8 G/DL
ALP SERPL-CCNC: 337 U/L
ALT SERPL W/O P-5'-P-CCNC: 44 U/L
ANION GAP SERPL CALC-SCNC: 9 MMOL/L
AST SERPL-CCNC: 31 U/L
BACTERIA #/AREA URNS AUTO: ABNORMAL /HPF
BASOPHILS # BLD AUTO: 0.09 K/UL
BASOPHILS NFR BLD: 0.5 %
BILIRUB SERPL-MCNC: 3.6 MG/DL
BILIRUB UR QL STRIP: ABNORMAL
BUN SERPL-MCNC: 54 MG/DL
CALCIUM SERPL-MCNC: 7.9 MG/DL
CHLORIDE SERPL-SCNC: 102 MMOL/L
CLARITY UR REFRACT.AUTO: ABNORMAL
CO2 SERPL-SCNC: 22 MMOL/L
COLOR UR AUTO: ABNORMAL
CREAT SERPL-MCNC: 3.4 MG/DL
DIFFERENTIAL METHOD: ABNORMAL
EOSINOPHIL # BLD AUTO: 0.6 K/UL
EOSINOPHIL NFR BLD: 3.3 %
ERYTHROCYTE [DISTWIDTH] IN BLOOD BY AUTOMATED COUNT: 19.6 %
EST. GFR  (AFRICAN AMERICAN): 22.5 ML/MIN/1.73 M^2
EST. GFR  (NON AFRICAN AMERICAN): 19.5 ML/MIN/1.73 M^2
GLUCOSE SERPL-MCNC: 199 MG/DL
GLUCOSE UR QL STRIP: NEGATIVE
HCT VFR BLD AUTO: 25.1 %
HGB BLD-MCNC: 7.9 G/DL
HGB UR QL STRIP: ABNORMAL
HYALINE CASTS UR QL AUTO: 0 /LPF
IMM GRANULOCYTES # BLD AUTO: 0.33 K/UL
IMM GRANULOCYTES NFR BLD AUTO: 1.8 %
INR PPP: 1.2
KETONES UR QL STRIP: NEGATIVE
LEUKOCYTE ESTERASE UR QL STRIP: ABNORMAL
LYMPHOCYTES # BLD AUTO: 0.7 K/UL
LYMPHOCYTES NFR BLD: 3.9 %
MAGNESIUM SERPL-MCNC: 1.6 MG/DL
MCH RBC QN AUTO: 29.6 PG
MCHC RBC AUTO-ENTMCNC: 31.5 G/DL
MCV RBC AUTO: 94 FL
MICROSCOPIC COMMENT: ABNORMAL
MONOCYTES # BLD AUTO: 0.4 K/UL
MONOCYTES NFR BLD: 2.2 %
NEUTROPHILS # BLD AUTO: 16.1 K/UL
NEUTROPHILS NFR BLD: 88.3 %
NITRITE UR QL STRIP: NEGATIVE
NON-SQ EPI CELLS #/AREA URNS AUTO: 6 /HPF
NRBC BLD-RTO: 0 /100 WBC
PH UR STRIP: 5 [PH] (ref 5–8)
PHOSPHATE SERPL-MCNC: 4 MG/DL
PLATELET # BLD AUTO: 319 K/UL
PMV BLD AUTO: 13.3 FL
POCT GLUCOSE: 171 MG/DL (ref 70–110)
POCT GLUCOSE: 183 MG/DL (ref 70–110)
POCT GLUCOSE: 226 MG/DL (ref 70–110)
POCT GLUCOSE: 255 MG/DL (ref 70–110)
POTASSIUM SERPL-SCNC: 4.2 MMOL/L
PROT SERPL-MCNC: 4.9 G/DL
PROT UR QL STRIP: ABNORMAL
PROTHROMBIN TIME: 12.3 SEC
RBC # BLD AUTO: 2.67 M/UL
RBC #/AREA URNS AUTO: 12 /HPF (ref 0–4)
SODIUM SERPL-SCNC: 133 MMOL/L
SP GR UR STRIP: 1.02 (ref 1–1.03)
SQUAMOUS #/AREA URNS AUTO: 4 /HPF
TACROLIMUS BLD-MCNC: 13.8 NG/ML
URN SPEC COLLECT METH UR: ABNORMAL
WBC # BLD AUTO: 18.23 K/UL
WBC #/AREA URNS AUTO: 4 /HPF (ref 0–5)
YEAST UR QL AUTO: ABNORMAL

## 2018-11-30 PROCEDURE — 93010 EKG 12-LEAD: ICD-10-PCS | Mod: ,,, | Performed by: INTERNAL MEDICINE

## 2018-11-30 PROCEDURE — 85610 PROTHROMBIN TIME: CPT

## 2018-11-30 PROCEDURE — 63600175 PHARM REV CODE 636 W HCPCS: Performed by: STUDENT IN AN ORGANIZED HEALTH CARE EDUCATION/TRAINING PROGRAM

## 2018-11-30 PROCEDURE — 99233 SBSQ HOSP IP/OBS HIGH 50: CPT | Mod: ,,, | Performed by: INTERNAL MEDICINE

## 2018-11-30 PROCEDURE — 25000003 PHARM REV CODE 250: Performed by: STUDENT IN AN ORGANIZED HEALTH CARE EDUCATION/TRAINING PROGRAM

## 2018-11-30 PROCEDURE — 87075 CULTR BACTERIA EXCEPT BLOOD: CPT

## 2018-11-30 PROCEDURE — 63600175 PHARM REV CODE 636 W HCPCS: Performed by: NURSE PRACTITIONER

## 2018-11-30 PROCEDURE — 87086 URINE CULTURE/COLONY COUNT: CPT

## 2018-11-30 PROCEDURE — 87205 SMEAR GRAM STAIN: CPT

## 2018-11-30 PROCEDURE — 99233 PR SUBSEQUENT HOSPITAL CARE,LEVL III: ICD-10-PCS | Mod: ,,, | Performed by: INTERNAL MEDICINE

## 2018-11-30 PROCEDURE — 63600175 PHARM REV CODE 636 W HCPCS: Performed by: TRANSPLANT SURGERY

## 2018-11-30 PROCEDURE — 94640 AIRWAY INHALATION TREATMENT: CPT

## 2018-11-30 PROCEDURE — 81001 URINALYSIS AUTO W/SCOPE: CPT

## 2018-11-30 PROCEDURE — 80197 ASSAY OF TACROLIMUS: CPT

## 2018-11-30 PROCEDURE — 87070 CULTURE OTHR SPECIMN AEROBIC: CPT

## 2018-11-30 PROCEDURE — 27000221 HC OXYGEN, UP TO 24 HOURS

## 2018-11-30 PROCEDURE — 87106 FUNGI IDENTIFICATION YEAST: CPT

## 2018-11-30 PROCEDURE — 93005 ELECTROCARDIOGRAM TRACING: CPT

## 2018-11-30 PROCEDURE — 93010 ELECTROCARDIOGRAM REPORT: CPT | Mod: ,,, | Performed by: INTERNAL MEDICINE

## 2018-11-30 PROCEDURE — 99900035 HC TECH TIME PER 15 MIN (STAT)

## 2018-11-30 PROCEDURE — 99233 PR SUBSEQUENT HOSPITAL CARE,LEVL III: ICD-10-PCS | Mod: 24,,, | Performed by: NURSE PRACTITIONER

## 2018-11-30 PROCEDURE — 94761 N-INVAS EAR/PLS OXIMETRY MLT: CPT

## 2018-11-30 PROCEDURE — 84100 ASSAY OF PHOSPHORUS: CPT

## 2018-11-30 PROCEDURE — 80053 COMPREHEN METABOLIC PANEL: CPT

## 2018-11-30 PROCEDURE — 85025 COMPLETE CBC W/AUTO DIFF WBC: CPT

## 2018-11-30 PROCEDURE — 94664 DEMO&/EVAL PT USE INHALER: CPT

## 2018-11-30 PROCEDURE — 25000003 PHARM REV CODE 250: Performed by: NURSE PRACTITIONER

## 2018-11-30 PROCEDURE — 89051 BODY FLUID CELL COUNT: CPT

## 2018-11-30 PROCEDURE — 87088 URINE BACTERIA CULTURE: CPT

## 2018-11-30 PROCEDURE — 36415 COLL VENOUS BLD VENIPUNCTURE: CPT

## 2018-11-30 PROCEDURE — 87040 BLOOD CULTURE FOR BACTERIA: CPT | Mod: 59

## 2018-11-30 PROCEDURE — 87102 FUNGUS ISOLATION CULTURE: CPT

## 2018-11-30 PROCEDURE — 25000242 PHARM REV CODE 250 ALT 637 W/ HCPCS: Performed by: NURSE PRACTITIONER

## 2018-11-30 PROCEDURE — 83735 ASSAY OF MAGNESIUM: CPT

## 2018-11-30 PROCEDURE — 20600001 HC STEP DOWN PRIVATE ROOM

## 2018-11-30 PROCEDURE — 99233 SBSQ HOSP IP/OBS HIGH 50: CPT | Mod: 24,,, | Performed by: NURSE PRACTITIONER

## 2018-11-30 PROCEDURE — 80100014 HC HEMODIALYSIS 1:1

## 2018-11-30 RX ORDER — MIDODRINE HYDROCHLORIDE 5 MG/1
5 TABLET ORAL ONCE
Status: COMPLETED | OUTPATIENT
Start: 2018-11-30 | End: 2018-11-30

## 2018-11-30 RX ORDER — TACROLIMUS 1 MG/1
4 CAPSULE ORAL EVERY EVENING
Status: DISCONTINUED | OUTPATIENT
Start: 2018-11-30 | End: 2018-12-01

## 2018-11-30 RX ORDER — HEPARIN SODIUM 5000 [USP'U]/ML
5000 INJECTION, SOLUTION INTRAVENOUS; SUBCUTANEOUS EVERY 8 HOURS
Status: DISCONTINUED | OUTPATIENT
Start: 2018-12-01 | End: 2018-12-24

## 2018-11-30 RX ORDER — TACROLIMUS 1 MG/1
5 CAPSULE ORAL EVERY MORNING
Status: DISCONTINUED | OUTPATIENT
Start: 2018-12-01 | End: 2018-12-01

## 2018-11-30 RX ORDER — SODIUM CHLORIDE 9 MG/ML
INJECTION, SOLUTION INTRAVENOUS
Status: DISCONTINUED | OUTPATIENT
Start: 2018-11-30 | End: 2018-12-06

## 2018-11-30 RX ORDER — TACROLIMUS 1 MG/1
5 CAPSULE ORAL EVERY MORNING
Status: DISCONTINUED | OUTPATIENT
Start: 2018-11-30 | End: 2018-11-30

## 2018-11-30 RX ORDER — MIDODRINE HYDROCHLORIDE 5 MG/1
10 TABLET ORAL 3 TIMES DAILY PRN
Status: DISCONTINUED | OUTPATIENT
Start: 2018-11-30 | End: 2018-12-31

## 2018-11-30 RX ORDER — SODIUM CHLORIDE 9 MG/ML
INJECTION, SOLUTION INTRAVENOUS ONCE
Status: DISCONTINUED | OUTPATIENT
Start: 2018-11-30 | End: 2018-12-03

## 2018-11-30 RX ORDER — TRAZODONE HYDROCHLORIDE 50 MG/1
50 TABLET ORAL NIGHTLY
Status: DISCONTINUED | OUTPATIENT
Start: 2018-11-30 | End: 2018-12-06

## 2018-11-30 RX ADMIN — TRAZODONE HYDROCHLORIDE 50 MG: 50 TABLET ORAL at 11:11

## 2018-11-30 RX ADMIN — MORPHINE 100 MG: 10 SOLUTION ORAL at 11:11

## 2018-11-30 RX ADMIN — OXYCODONE HYDROCHLORIDE 10 MG: 10 TABLET ORAL at 07:11

## 2018-11-30 RX ADMIN — TACROLIMUS 4 MG: 1 CAPSULE ORAL at 06:11

## 2018-11-30 RX ADMIN — OXYCODONE HYDROCHLORIDE 10 MG: 10 TABLET ORAL at 11:11

## 2018-11-30 RX ADMIN — OXYCODONE HYDROCHLORIDE 10 MG: 10 TABLET ORAL at 02:11

## 2018-11-30 RX ADMIN — OXYCODONE HYDROCHLORIDE 10 MG: 10 TABLET ORAL at 05:11

## 2018-11-30 RX ADMIN — OXYCODONE HYDROCHLORIDE 5 MG: 5 TABLET ORAL at 12:11

## 2018-11-30 RX ADMIN — INSULIN ASPART 2 UNITS: 100 INJECTION, SOLUTION INTRAVENOUS; SUBCUTANEOUS at 12:11

## 2018-11-30 RX ADMIN — FAMOTIDINE 20 MG: 20 TABLET ORAL at 11:11

## 2018-11-30 RX ADMIN — OXYCODONE HYDROCHLORIDE 10 MG: 10 TABLET ORAL at 09:11

## 2018-11-30 RX ADMIN — IPRATROPIUM BROMIDE AND ALBUTEROL SULFATE 3 ML: .5; 3 SOLUTION RESPIRATORY (INHALATION) at 07:11

## 2018-11-30 RX ADMIN — SODIUM BICARBONATE 650 MG TABLET 650 MG: at 07:11

## 2018-11-30 RX ADMIN — LINEZOLID 600 MG: 600 TABLET, FILM COATED ORAL at 11:11

## 2018-11-30 RX ADMIN — HEPARIN SODIUM 5000 UNITS: 5000 INJECTION, SOLUTION INTRAVENOUS; SUBCUTANEOUS at 05:11

## 2018-11-30 RX ADMIN — INSULIN ASPART 3 UNITS: 100 INJECTION, SOLUTION INTRAVENOUS; SUBCUTANEOUS at 06:11

## 2018-11-30 RX ADMIN — IPRATROPIUM BROMIDE AND ALBUTEROL SULFATE 3 ML: .5; 3 SOLUTION RESPIRATORY (INHALATION) at 08:11

## 2018-11-30 RX ADMIN — IPRATROPIUM BROMIDE AND ALBUTEROL SULFATE 3 ML: .5; 3 SOLUTION RESPIRATORY (INHALATION) at 11:11

## 2018-11-30 RX ADMIN — URSODIOL 300 MG: 300 CAPSULE ORAL at 11:11

## 2018-11-30 RX ADMIN — LINEZOLID 600 MG: 600 TABLET, FILM COATED ORAL at 08:11

## 2018-11-30 RX ADMIN — TACROLIMUS 6 MG: 1 CAPSULE ORAL at 07:11

## 2018-11-30 RX ADMIN — ISAVUCONAZONIUM SULFATE 372 MG: 186 CAPSULE ORAL at 08:11

## 2018-11-30 RX ADMIN — PREDNISONE 15 MG: 5 TABLET ORAL at 08:11

## 2018-11-30 RX ADMIN — URSODIOL 300 MG: 300 CAPSULE ORAL at 08:11

## 2018-11-30 RX ADMIN — MIDODRINE HYDROCHLORIDE 5 MG: 5 TABLET ORAL at 11:11

## 2018-11-30 RX ADMIN — SODIUM BICARBONATE 650 MG TABLET 650 MG: at 06:11

## 2018-11-30 NOTE — SUBJECTIVE & OBJECTIVE
Interval History: Patient evaluated bedside, in no acute distress, tolerating well treatment.  Night was uneventful.      Review of patient's allergies indicates:   Allergen Reactions    Penicillins Nausea And Vomiting and Rash     Full body rash from cefepime as well     Current Facility-Administered Medications   Medication Frequency    0.9%  NaCl infusion Once    albuterol-ipratropium 2.5 mg-0.5 mg/3 mL nebulizer solution 3 mL Q4H PRN    albuterol-ipratropium 2.5 mg-0.5 mg/3 mL nebulizer solution 3 mL Q4H WAKE    dextrose 50% injection 12.5 g PRN    dextrose 50% injection 25 g PRN    ergocalciferol capsule 50,000 Units Q7 Days    famotidine tablet 20 mg QHS    glucagon (human recombinant) injection 1 mg PRN    glucose chewable tablet 16 g PRN    glucose chewable tablet 24 g PRN    heparin (porcine) injection 1,000 Units PRN    [START ON 12/1/2018] heparin (porcine) injection 5,000 Units Q8H    insulin aspart U-100 pen 0-5 Units QID (AC + HS) PRN    isavuconazonium sulfate Cap 372 mg Daily    lidocaine HCL 10 mg/ml (1%) injection 1 mL Once    linezolid tablet 600 mg Q12H    midodrine tablet 10 mg TID PRN    ondansetron injection 4 mg Q6H PRN    oxyCODONE immediate release tablet 5 mg Q4H PRN    oxyCODONE immediate release tablet Tab 10 mg Q4H PRN    predniSONE tablet 15 mg Daily    Followed by    [START ON 12/3/2018] predniSONE tablet 10 mg Daily    Followed by    [START ON 12/10/2018] predniSONE tablet 5 mg Daily    Followed by    [START ON 12/17/2018] predniSONE tablet 2.5 mg Daily    sodium bicarbonate tablet 650 mg BID    sulfamethoxazole-trimethoprim 400-80mg per tablet 1 tablet Twice Weekly    tacrolimus capsule 6 mg BID    trazodone split tablet 25 mg Nightly PRN    traZODone tablet 50 mg QHS    ursodiol capsule 300 mg BID    valganciclovir 50 mg/ml oral solution 100 mg Once per day on Mon Wed Fri       Objective:     Vital Signs (Most Recent):  Temp: 98.2 °F (36.8 °C)  (11/30/18 1152)  Pulse: 101 (11/30/18 1120)  Resp: 18 (11/30/18 1120)  BP: 125/83 (11/30/18 0800)  SpO2: 97 % (11/30/18 1120)  O2 Device (Oxygen Therapy): room air (11/30/18 1120) Vital Signs (24h Range):  Temp:  [98 °F (36.7 °C)-98.2 °F (36.8 °C)] 98.2 °F (36.8 °C)  Pulse:  [] 101  Resp:  [18-24] 18  SpO2:  [97 %-99 %] 97 %  BP: (109-125)/(74-83) 125/83     Weight: 83.1 kg (183 lb 3.2 oz) (11/29/18 0500)  Body mass index is 26.29 kg/m².  Body surface area is 2.03 meters squared.    I/O last 3 completed shifts:  In: 420 [P.O.:420]  Out: 285 [Urine:225; Drains:60]    Physical Exam  Constitutional: He appears well-developed.   HENT:   Head: Normocephalic and atraumatic.   Eyes: EOM are normal.   Neck: No JVD present.   Cardiovascular: Normal rate and regular rhythm. Exam reveals no friction rub.   Pulmonary/Chest: Effort normal and breath sounds normal.   Abdominal: Soft. He exhibits distension.   Musculoskeletal: He exhibits no edema.   Neurological: He is alert.   Skin: Skin is warm.   Nursing notes and vitals reviewed.    Significant Labs:  CBC:   Recent Labs   Lab 11/30/18  0723   WBC 18.23*   RBC 2.67*   HGB 7.9*   HCT 25.1*      MCV 94   MCH 29.6   MCHC 31.5*     CMP:   Recent Labs   Lab 11/30/18  0723   *   CALCIUM 7.9*   ALBUMIN 1.8*   PROT 4.9*   *   K 4.2   CO2 22*      BUN 54*   CREATININE 3.4*   ALKPHOS 337*   ALT 44   AST 31   BILITOT 3.6*     All labs within the past 24 hours have been reviewed.     Significant Imaging:  CXR personally reviewed.

## 2018-11-30 NOTE — ASSESSMENT & PLAN NOTE
- clarita past 2 weeks.  Pt on Midodrine.  Last HD 11/9/18- 0 fluid removed 2/2 hypotension.  - Nephrology following for HD>  Pt cont to have hypotenstion worse w standing.  Midodrine 10 mg PRN for hypotension during HD ordered.

## 2018-11-30 NOTE — ASSESSMENT & PLAN NOTE
Nutrition Problem  Malnutrition in the context of Chronic Illness/Injury     Related to (etiology):  Cirrhosis and poor appetite     Signs and Symptoms (as evidenced by):  Energy Intake: <75% of estimated energy requirement for 2 months  Body Fat Depletion: moderate depletion of orbitals and triceps   Muscle Mass Depletion: moderate depletion of temples, clavicle region and scapular region   Weight Loss: 14% x 2 months (per patient, unable to verify per chart review)     Nutrition Diagnosis Status:  Continues

## 2018-11-30 NOTE — ASSESSMENT & PLAN NOTE
- muscle wasting noted.  Appetite slowly improving.  Tolerating supplements.  Dietician following.  Apprec recs.

## 2018-11-30 NOTE — ASSESSMENT & PLAN NOTE
- See intra-abdominal abscess.  Plan for thora today to drain pleural fluid to assess for infection 11/30.

## 2018-11-30 NOTE — ASSESSMENT & PLAN NOTE
- Pt with intermittent confusion since transplant but has now improved slowly.   - Pt with some lethargy and confusion on 11/21. Head CT negative.   - AAOx3. More alert and awake on 11/27.   - AAOx4 on 11/29.  He was happy he could remember what day it is today.    - confusion continues to improve.  Seroquel d/c'd.  Trazodone for insomnia ordered w PRN dose.    - Monitor closely.

## 2018-11-30 NOTE — PROGRESS NOTES
[facility]  Liver Transplant  Progress Note    Patient Name: Femi Enciso  MRN: [id number]  Admission Date: 10/27/2018  Hospital Length of Stay: 34 days  Code Status: Full Code  Primary Care Provider: Primary Doctor No  Post-Operative Day: 19    ORGAN:   LIVER  Disease Etiology: Alcoholic Cirrhosis  Donor Type:    - Brain Death  CDC High Risk:   No  Donor CMV Status:   Donor CMV Status: Positive  Donor HBcAB:   Negative  Donor HCV Status:   Negative  Donor HBV JAVIER: Negative  Donor HCV JAVIER: Negative  Whole or Partial: Whole Liver  Biliary Anastomosis: End to End  Arterial Anatomy: Standard  Subjective:     History of Present Illness:  Femi Enciso is a 53yr old male with PMH of acute ETOH hepatitis and DEL/ATN evolved to ESRD requiring HD (not candidate for KTX). He is now s/p liver transplant (SM induction, DBD, CMV D+/R-). Transplant surgery noted severe collateralization, adrenal gland firmly adhered to liver. Bare area of adrenal gland required several stitches and packing to obtain fair hemostasis (EBL 15L). OR take back  for bleeding from R adrenal bed in AM (significant clot in retroperitoneum, posterior to R hepatic lobe, tract posterior to the R kidney containing significant clot, small bleeding area of retroperitoneal fat) then returned to surgery same day for hemorrhaging closed with wound vac with plans to return to OR 24-48 hours for closure (retroperitoneal /retrorenal/retrocolic hematoma on the right ). Raw retroperitoneal bleeding. Suture ligatures placed, argon beam cautery, evarest placed in retroperitoneum behind cava and right kidney. Significant oozing from upper right adrenal gland, not amenable to ligation, that portion of the adrenal gland was resected with cautery). OR take back  for washout and incisional closure, no significant bleeding or hematoma found. OR cultures   from body fluid grew enterococcus faecium VRE. ID was consulted,  started on  daptomycin for VRE treatment and cefepime/flagyl to cover for IA ty in bile. Patient with significant leukocytosis with peak on 11/22 at 48K prompting drainage of R subphrenic fluid collection, perc drain placed, culture grew VRE. Stroke code called 11/21 for lethargy and unresponsive, CT head without evidence of acute ischemic changes and CTA chest negative, vascular neurology was consulted recommended MRI brain, but patient's AMS improved shortly after event. Nephrology consulted for ESRD requiring HD. Patient overall improved on antibiotic regimen, leukocytosis and AMS improved. He was transferred to [facility] on POD #15.     Hospital Course:  Interval History:  No acute events overnight.  Decreased Seroquel and started low dose Trazodone last night.  Pt states he did not sleep well.  He is AOx4.  Seroquel discontinued today.  Scheduled Trazodone ordered w PRN dose also.  His appetite is slowly improving.  He tolerated 45 g of protein yesterday.  He denies n/v.  (+) flatus, LBM 11/29.  He denies abdominal pain but notes pain to low back which is chronic.  Last perc drain removed 11/29.  Chevron w staples CDI, no drainage.  CXR this am same.  Pulse ox 97% on RA.  He notes pain worse w deep breath occasionally.  Breathing improved since starting Albuterol tx and ordered CPT.  CXR this am still w RLL pleural effusion.  Plan for thora tristin pain mgt and to assess for infection today.  LFTs are trending down.  Cr is up from 2.2 to 2.7.  He made 225ml of urine.  Electrolytes are stable. Nephrology planning for HD this afternoon.  Of note, patient SBP has been 101-116.  Will order Midodrine PRN for hypotension w HD.  ID is following and based off of CT A/P changed abx from Dapto, Cefepime, and Flagyl to Linezolid 600 mg PO q 12 hr (started 11/29)for 10 days (tentative stop date 12/10/18).  WBC w no change past 3 days - still 18.24.  Last Blood cx 11/21.  Will order blood and UA/urine cx.  PT/OT following. Patient also doing  exercises in room w CG.  He remains significantly debilitated. Monitor.     CXR in am.    Scheduled Meds:   sodium chloride 0.9%   Intravenous Once    albuterol-ipratropium  3 mL Nebulization Q4H WAKE    ergocalciferol  50,000 Units Oral Q7 Days    famotidine  20 mg Oral QHS    heparin (porcine)  5,000 Units Subcutaneous Q8H    isavuconazonium sulfate  372 mg Oral Daily    lidocaine HCL 10 mg/ml (1%)  1 mL Intradermal Once    linezolid  600 mg Oral Q12H    midodrine  5 mg Oral Once    predniSONE  15 mg Oral Daily    Followed by    [START ON 12/3/2018] predniSONE  10 mg Oral Daily    Followed by    [START ON 12/10/2018] predniSONE  5 mg Oral Daily    Followed by    [START ON 12/17/2018] predniSONE  2.5 mg Oral Daily    sodium bicarbonate  650 mg Oral BID    sulfamethoxazole-trimethoprim 400-80mg  1 tablet Oral Twice Weekly    tacrolimus  6 mg Oral BID    traZODone  25 mg Oral QHS    traZODone  50 mg Oral QHS    ursodiol  300 mg Oral BID    valganciclovir 50 mg/ml  100 mg Oral Once per day on Mon Wed Fri     Continuous Infusions:  PRN Meds:albuterol-ipratropium, dextrose 50%, dextrose 50%, glucagon (human recombinant), glucose, glucose, heparin (porcine), insulin aspart U-100, midodrine, ondansetron, oxyCODONE, oxyCODONE    Review of Systems   Constitutional: Positive for activity change, appetite change (improving) and fatigue. Negative for fever.   HENT: Negative.  Negative for facial swelling.    Eyes: Negative.    Respiratory: Positive for shortness of breath (w exertion). Negative for apnea, chest tightness and wheezing.    Cardiovascular: Negative.  Negative for chest pain, palpitations and leg swelling.   Gastrointestinal: Positive for abdominal distention and abdominal pain. Negative for constipation, diarrhea, nausea and vomiting.   Genitourinary: Negative.  Negative for decreased urine volume, difficulty urinating, dysuria, hematuria and urgency.   Musculoskeletal: Negative.  Negative for  back pain, gait problem, neck pain and neck stiffness.   Skin: Positive for wound. Negative for color change and pallor.   Allergic/Immunologic: Positive for immunocompromised state.   Neurological: Positive for weakness. Negative for dizziness, seizures, light-headedness and headaches.   Psychiatric/Behavioral: Negative for behavioral problems, confusion, decreased concentration, hallucinations, sleep disturbance and suicidal ideas. The patient is not nervous/anxious.      Objective:     Vital Signs (Most Recent):  Temp: 98 °F (36.7 °C) (11/30/18 0800)  Pulse: 80 (11/30/18 0841)  Resp: 18 (11/30/18 0841)  BP: 125/83 (11/30/18 0800)  SpO2: 99 % (11/30/18 0841) Vital Signs (24h Range):  Temp:  [98 °F (36.7 °C)-98.2 °F (36.8 °C)] 98 °F (36.7 °C)  Pulse:  [] 80  Resp:  [18-24] 18  SpO2:  [97 %-99 %] 99 %  BP: (101-125)/(73-83) 125/83     Weight: 83.1 kg (183 lb 3.2 oz)  Body mass index is 26.29 kg/m².    Intake/Output - Last 3 Shifts       11/28 0700 - 11/29 0659 11/29 0700 - 11/30 0659 11/30 0700 - 12/01 0659    P.O. 740 180     I.V. (mL/kg) 0 (0) 0 (0)     Other 0 0     IV Piggyback 100      Total Intake(mL/kg) 840 (10.1) 180 (2.2)     Urine (mL/kg/hr) 135 (0.1) 225 (0.1)     Drains 580      Other       Stool 0 0     Total Output 715 225     Net +125 -45            Urine Occurrence 0 x 0 x     Stool Occurrence 2 x 1 x           Physical Exam   Constitutional: He is oriented to person, place, and time. He appears well-developed. No distress.   HENT:   Head: Normocephalic and atraumatic.   Eyes: Pupils are equal, round, and reactive to light. Scleral icterus is present.   Neck: Normal range of motion. Neck supple. No JVD present.   Cardiovascular: Normal rate, regular rhythm and normal heart sounds.   No murmur heard.  Pulmonary/Chest: Effort normal. No respiratory distress. He has decreased breath sounds in the right lower field and the left lower field. He has no wheezes. He exhibits no tenderness.   Abdominal:  Soft. Bowel sounds are normal. He exhibits no distension. There is tenderness.   Chevron inc ECTOR w/ staples  RLQ JOSE A drain and percutaneous drain site w suture CDI.    Musculoskeletal: Normal range of motion. He exhibits edema. He exhibits no tenderness.   Neurological: He is alert and oriented to person, place, and time. He has normal reflexes.   Skin: Skin is warm and dry. He is not diaphoretic.   Psychiatric: He has a normal mood and affect. His behavior is normal. Judgment and thought content normal.   Nursing note and vitals reviewed.      Laboratory:  Immunosuppressants         Stop Route Frequency     tacrolimus capsule 6 mg      -- Oral 2 times daily        CBC:   Recent Labs   Lab 11/30/18  0723   WBC 18.23*   RBC 2.67*   HGB 7.9*   HCT 25.1*      MCV 94   MCH 29.6   MCHC 31.5*     CMP:   Recent Labs   Lab 11/30/18  0723   *   CALCIUM 7.9*   ALBUMIN 1.8*   PROT 4.9*   *   K 4.2   CO2 22*      BUN 54*   CREATININE 3.4*   ALKPHOS 337*   ALT 44   AST 31   BILITOT 3.6*     Coagulation:   Recent Labs   Lab 11/29/18  0703 11/30/18  0723   INR 1.2 1.2   APTT 30.9  --      Tacrolimus Lvl   Date Value Ref Range Status   11/30/2018 13.8 5.0 - 15.0 ng/mL Final     Comment:     Testing performed by Liquid Chromatography-Tandem  Mass Spectrometry (LC-MS/MS).  This test was developed and its performance characteristics  determined by [facility], Department of Pathology  and Laboratory Medicine in a manner consistent with CLIA  requirements. It has not been cleared or approved by the [place]  Food and Drug Administration.  This test is used for clinical   purposes.  It should not be regarded as investigational or for  research.     11/29/2018 9.3 5.0 - 15.0 ng/mL Final     Comment:     Testing performed by Liquid Chromatography-Tandem  Mass Spectrometry (LC-MS/MS).  This test was developed and its performance characteristics  determined by [facility], Department of  Pathology  and Laboratory Medicine in a manner consistent with CLIA  requirements. It has not been cleared or approved by the [place]  Food and Drug Administration.  This test is used for clinical   purposes.  It should not be regarded as investigational or for  research.     11/28/2018 6.9 5.0 - 15.0 ng/mL Final     Comment:     Testing performed by Liquid Chromatography-Tandem  Mass Spectrometry (LC-MS/MS).  This test was developed and its performance characteristics  determined by [facility], Department of Pathology  and Laboratory Medicine in a manner consistent with CLIA  requirements. It has not been cleared or approved by the [place]  Food and Drug Administration.  This test is used for clinical   purposes.  It should not be regarded as investigational or for  research.     11/27/2018 5.2 5.0 - 15.0 ng/mL Final     Comment:     Testing performed by Liquid Chromatography-Tandem  Mass Spectrometry (LC-MS/MS).  This test was developed and its performance characteristics  determined by [facility], Department of Pathology  and Laboratory Medicine in a manner consistent with CLIA  requirements. It has not been cleared or approved by the [place]  Food and Drug Administration.  This test is used for clinical   purposes.  It should not be regarded as investigational or for  research.           Labs within the past 24 hours have been reviewed.    Diagnostic Results:  None    Assessment/Plan:     * S/P liver transplant    - 53 year old male with history of ESLD 2/2 ETOH cirrhosis who is s/p liver transplant c/b take-back x 3 for bleeding most recently 11/18.  - LFTs now improving slowly.  T bili was 37.6 day of transplant.  - Pt remains with significant debility. Pt will need SNF placement when medically stable.   - Appetite slowly improving.  Tolerating supplements.  Encourage PO intake.   - Drain placed during take back. Drain placed to intra-abdominal abscess on 11/22.   - Fluid from collection noted with  enterococcus VRE. Cont with antibxs per ID.  De escalated to PO on 11/29/18.    - Abdominal pain well controlled, and having BMs.    - HD per nephrology. Last HD on 11/30.        Moderate malnutrition    - muscle wasting noted.  Appetite slowly improving.  Tolerating supplements.  Dietician following.  Apprec recs.         Acute renal failure with acute tubular necrosis superimposed on stage 3 chronic kidney disease    - Renal function slow to recover post-op.   - Nephrology consulted for management.   - Cont with HD as per nephrology recs.  Apprec recs.    - HD on 11/27 w/ 2L off. Pt tolerated well.    - Lasix 160 mg challenge 11/28- pt diuresed recorded 105 ml and 2 missed.    - HD planned for today at bedside.       Intra-abdominal abscess    - Significant increase in WBC post-op.   - OR cultures from 11/18 noted with enterococcus VRE.   - Abdominal CT performed at that time with fluid collection and drain placed on 11/22 for drainage.   - Intra-abdominal abscess culture also with enterococcus VRE.   - ID consulted and pt placed on antibxs for treatment. Pt was on Cefepime, Daptomycin, and Fluconazole for treatment.    - Pt remains with RLQ drains (one JOSE A and one Percutaneous).  One JOSE A removed 11/28.  Perc drain in placed draining tan, clear drainage.  WBC slowly improving.   - Liver US on 11/26 reviewed.   - Abdominal CT performed 11/27 and reviewed. Fluid collection noted with improvement on CT.  ID following and reassured by findings.    - Dapto, Cefepime and Flagyl changed 11/30/18 to Linezolid 600 mg PO q 12 hr x 10 days per ID.        Long-term use of immunosuppressant medication    - Cont with prograf. Draw prograf level daily and adjust dose as needed to maintain a therapeutic level.        At risk for opportunistic infections    - Cont with bactrim and valcyte for protection against opportunistic infections.   - cont Isavuconazonium.     Leucocytosis    - See intra-abdominal abscess.  Plan for thora  today to drain pleural fluid to assess for infection 11/30.       Delirium    - Pt with intermittent confusion since transplant but has now improved slowly.   - Pt with some lethargy and confusion on 11/21. Head CT negative.   - AAOx3. More alert and awake on 11/27.   - AAOx4 on 11/29.  He was happy he could remember what day it is today.    - confusion continues to improve.  Seroquel d/c'd.  Trazodone for insomnia ordered w PRN dose.    - Monitor closely.        Prophylactic immunotherapy    - See long term immunosuppression.        Weakness    - Pt remains significantly debilitated.   - PT/OT for strengthening.   - Currently will need SNF for placement and further rehabilitation.        Pleural effusion    - c/o increased pain w deep breath today to right side.  CXR showed increased opacity in the inferior hemothorax on the right side since 11/26/2018.  Pulse ox 97-99% on RA.  Cont to monitor    - continues to have fluid to RLL.  WBC remains elevated.  Plan for thoracentesis for symptom management as well as assess for infection.    - CXR in am.       Type 2 diabetes mellitus without complication    - Endocrine consulted for management. Appreciate recs.        Anemia of chronic disease    - H&H stable. Keep type and screen current. Monitor.        DEL (acute kidney injury)    - DEL for over 2 weeks prior to transplant. Nephrology was consulted.     - Post transplant, Nephrology cont to follow.  He received HD last on 11/27 w/ 2L off.     - Attempted Lasix challenge (160 mg) x1 on 11/28- pt diuresed 105 cc.    - Plan for HD today for metabolic clearance and fluid management.  Cont strict I/O's.  Monitor closely.     Hepatorenal syndrome    - del past 2 weeks.  Pt on Midodrine.  Last HD 11/9/18- 0 fluid removed 2/2 hypotension.  - Nephrology following for HD>  Pt cont to have hypotenstion worse w standing.  Midodrine 10 mg PRN for hypotension during HD ordered.           VTE Risk Mitigation (From admission, onward)         Ordered     heparin (porcine) injection 5,000 Units  Every 8 hours      11/30/18 1149     heparin (porcine) injection 1,000 Units  As needed (PRN)      11/25/18 1428     IP VTE HIGH RISK PATIENT  Once      11/11/18 1208          The patients clinical status was discussed at multidisplinary rounds, involving transplant surgery, transplant medicine, pharmacy, nursing, nutrition, and social work    Discharge Planning:  No Patient Care Coordination Note on file.      Hilary Galarza NP  Liver Transplant  [facility]

## 2018-11-30 NOTE — ASSESSMENT & PLAN NOTE
- Renal function slow to recover post-op.   - Nephrology consulted for management.   - Cont with HD as per nephrology recs.  Apprec recs.    - HD on 11/27 w/ 2L off. Pt tolerated well.    - Lasix 160 mg challenge 11/28- pt diuresed recorded 105 ml and 2 missed.    - HD planned for today at bedside.

## 2018-11-30 NOTE — ASSESSMENT & PLAN NOTE
- DEL for over 2 weeks prior to transplant. Nephrology was consulted.     - Post transplant, Nephrology cont to follow.  He received HD last on 11/27 w/ 2L off.     - Attempted Lasix challenge (160 mg) x1 on 11/28- pt diuresed 105 cc.    - Plan for HD today for metabolic clearance and fluid management.  Cont strict I/O's.  Monitor closely.

## 2018-11-30 NOTE — PT/OT/SLP PROGRESS
Physical Therapy      Patient Name:  Femi Enciso   MRN:  [id number]    Patient not seen today secondary to (Attempted in PM, but pt found leaving floor for thoracentesis and dialysis.). Will follow-up at next scheduled session as able.    Ramandeep Kumar, PT, DPT   11/30/2018  [phone]

## 2018-11-30 NOTE — ASSESSMENT & PLAN NOTE
- 53 year old male with history of ESLD 2/2 ETOH cirrhosis who is s/p liver transplant c/b take-back x 3 for bleeding most recently 11/18.  - LFTs now improving slowly.  T bili was 37.6 day of transplant.  - Pt remains with significant debility. Pt will need SNF placement when medically stable.   - Appetite slowly improving.  Tolerating supplements.  Encourage PO intake.   - Drain placed during take back. Drain placed to intra-abdominal abscess on 11/22.   - Fluid from collection noted with enterococcus VRE. Cont with antibxs per ID.  De escalated to PO on 11/29/18.    - Abdominal pain well controlled, and having BMs.    - HD per nephrology. Last HD on 11/30.

## 2018-11-30 NOTE — ASSESSMENT & PLAN NOTE
- c/o increased pain w deep breath today to right side.  CXR showed increased opacity in the inferior hemothorax on the right side since 11/26/2018.  Pulse ox 97-99% on RA.  Cont to monitor    - continues to have fluid to RLL.  WBC remains elevated.  Plan for thoracentesis for symptom management as well as assess for infection.    - CXR in am.

## 2018-11-30 NOTE — SUBJECTIVE & OBJECTIVE
Scheduled Meds:   sodium chloride 0.9%   Intravenous Once    albuterol-ipratropium  3 mL Nebulization Q4H WAKE    ergocalciferol  50,000 Units Oral Q7 Days    famotidine  20 mg Oral QHS    heparin (porcine)  5,000 Units Subcutaneous Q8H    isavuconazonium sulfate  372 mg Oral Daily    lidocaine HCL 10 mg/ml (1%)  1 mL Intradermal Once    linezolid  600 mg Oral Q12H    midodrine  5 mg Oral Once    predniSONE  15 mg Oral Daily    Followed by    [START ON 12/3/2018] predniSONE  10 mg Oral Daily    Followed by    [START ON 12/10/2018] predniSONE  5 mg Oral Daily    Followed by    [START ON 12/17/2018] predniSONE  2.5 mg Oral Daily    sodium bicarbonate  650 mg Oral BID    sulfamethoxazole-trimethoprim 400-80mg  1 tablet Oral Twice Weekly    tacrolimus  6 mg Oral BID    traZODone  25 mg Oral QHS    traZODone  50 mg Oral QHS    ursodiol  300 mg Oral BID    valganciclovir 50 mg/ml  100 mg Oral Once per day on Mon Wed Fri     Continuous Infusions:  PRN Meds:albuterol-ipratropium, dextrose 50%, dextrose 50%, glucagon (human recombinant), glucose, glucose, heparin (porcine), insulin aspart U-100, midodrine, ondansetron, oxyCODONE, oxyCODONE    Review of Systems   Constitutional: Positive for activity change, appetite change (improving) and fatigue. Negative for fever.   HENT: Negative.  Negative for facial swelling.    Eyes: Negative.    Respiratory: Positive for shortness of breath (w exertion). Negative for apnea, chest tightness and wheezing.    Cardiovascular: Negative.  Negative for chest pain, palpitations and leg swelling.   Gastrointestinal: Positive for abdominal distention and abdominal pain. Negative for constipation, diarrhea, nausea and vomiting.   Genitourinary: Negative.  Negative for decreased urine volume, difficulty urinating, dysuria, hematuria and urgency.   Musculoskeletal: Negative.  Negative for back pain, gait problem, neck pain and neck stiffness.   Skin: Positive for wound.  Negative for color change and pallor.   Allergic/Immunologic: Positive for immunocompromised state.   Neurological: Positive for weakness. Negative for dizziness, seizures, light-headedness and headaches.   Psychiatric/Behavioral: Negative for behavioral problems, confusion, decreased concentration, hallucinations, sleep disturbance and suicidal ideas. The patient is not nervous/anxious.      Objective:     Vital Signs (Most Recent):  Temp: 98 °F (36.7 °C) (11/30/18 0800)  Pulse: 80 (11/30/18 0841)  Resp: 18 (11/30/18 0841)  BP: 125/83 (11/30/18 0800)  SpO2: 99 % (11/30/18 0841) Vital Signs (24h Range):  Temp:  [98 °F (36.7 °C)-98.2 °F (36.8 °C)] 98 °F (36.7 °C)  Pulse:  [] 80  Resp:  [18-24] 18  SpO2:  [97 %-99 %] 99 %  BP: (101-125)/(73-83) 125/83     Weight: 83.1 kg (183 lb 3.2 oz)  Body mass index is 26.29 kg/m².    Intake/Output - Last 3 Shifts       11/28 0700 - 11/29 0659 11/29 0700 - 11/30 0659 11/30 0700 - 12/01 0659    P.O. 740 180     I.V. (mL/kg) 0 (0) 0 (0)     Other 0 0     IV Piggyback 100      Total Intake(mL/kg) 840 (10.1) 180 (2.2)     Urine (mL/kg/hr) 135 (0.1) 225 (0.1)     Drains 580      Other       Stool 0 0     Total Output 715 225     Net +125 -45            Urine Occurrence 0 x 0 x     Stool Occurrence 2 x 1 x           Physical Exam   Constitutional: He is oriented to person, place, and time. He appears well-developed. No distress.   HENT:   Head: Normocephalic and atraumatic.   Eyes: Pupils are equal, round, and reactive to light. Scleral icterus is present.   Neck: Normal range of motion. Neck supple. No JVD present.   Cardiovascular: Normal rate, regular rhythm and normal heart sounds.   No murmur heard.  Pulmonary/Chest: Effort normal. No respiratory distress. He has decreased breath sounds in the right lower field and the left lower field. He has no wheezes. He exhibits no tenderness.   Abdominal: Soft. Bowel sounds are normal. He exhibits no distension. There is tenderness.    Chevron inc ECTOR w/ staples  RLQ JOSE A drain and percutaneous drain site w suture CDI.    Musculoskeletal: Normal range of motion. He exhibits edema. He exhibits no tenderness.   Neurological: He is alert and oriented to person, place, and time. He has normal reflexes.   Skin: Skin is warm and dry. He is not diaphoretic.   Psychiatric: He has a normal mood and affect. His behavior is normal. Judgment and thought content normal.   Nursing note and vitals reviewed.      Laboratory:  Immunosuppressants         Stop Route Frequency     tacrolimus capsule 6 mg      -- Oral 2 times daily        CBC:   Recent Labs   Lab 11/30/18 0723   WBC 18.23*   RBC 2.67*   HGB 7.9*   HCT 25.1*      MCV 94   MCH 29.6   MCHC 31.5*     CMP:   Recent Labs   Lab 11/30/18 0723   *   CALCIUM 7.9*   ALBUMIN 1.8*   PROT 4.9*   *   K 4.2   CO2 22*      BUN 54*   CREATININE 3.4*   ALKPHOS 337*   ALT 44   AST 31   BILITOT 3.6*     Coagulation:   Recent Labs   Lab 11/29/18  0703 11/30/18 0723   INR 1.2 1.2   APTT 30.9  --      Tacrolimus Lvl   Date Value Ref Range Status   11/30/2018 13.8 5.0 - 15.0 ng/mL Final     Comment:     Testing performed by Liquid Chromatography-Tandem  Mass Spectrometry (LC-MS/MS).  This test was developed and its performance characteristics  determined by [facility], Department of Pathology  and Laboratory Medicine in a manner consistent with CLIA  requirements. It has not been cleared or approved by the [place]  Food and Drug Administration.  This test is used for clinical   purposes.  It should not be regarded as investigational or for  research.     11/29/2018 9.3 5.0 - 15.0 ng/mL Final     Comment:     Testing performed by Liquid Chromatography-Tandem  Mass Spectrometry (LC-MS/MS).  This test was developed and its performance characteristics  determined by [facility], Department of Pathology  and Laboratory Medicine in a manner consistent with CLIA  requirements. It has  not been cleared or approved by the [place]  Food and Drug Administration.  This test is used for clinical   purposes.  It should not be regarded as investigational or for  research.     11/28/2018 6.9 5.0 - 15.0 ng/mL Final     Comment:     Testing performed by Liquid Chromatography-Tandem  Mass Spectrometry (LC-MS/MS).  This test was developed and its performance characteristics  determined by [facility], Department of Pathology  and Laboratory Medicine in a manner consistent with CLIA  requirements. It has not been cleared or approved by the [place]  Food and Drug Administration.  This test is used for clinical   purposes.  It should not be regarded as investigational or for  research.     11/27/2018 5.2 5.0 - 15.0 ng/mL Final     Comment:     Testing performed by Liquid Chromatography-Tandem  Mass Spectrometry (LC-MS/MS).  This test was developed and its performance characteristics  determined by [facility], Department of Pathology  and Laboratory Medicine in a manner consistent with CLIA  requirements. It has not been cleared or approved by the [place]  Food and Drug Administration.  This test is used for clinical   purposes.  It should not be regarded as investigational or for  research.           Labs within the past 24 hours have been reviewed.    Diagnostic Results:  None

## 2018-11-30 NOTE — PROGRESS NOTES
[facility]  Nephrology  Progress Note    Patient Name: Femi Enciso  MRN: [id number]  Admission Date: 10/27/2018  Hospital Length of Stay: 34 days  Attending Provider: Femi Patel MD   Primary Care Physician: Primary Doctor No  Principal Problem:S/P liver transplant    Subjective:     HPI: 52 y/o man with DM2 presents to the ED with family for liver failure (likely due to EtOH abundant history of drinking - diagnosed in Sept 2018 in [place]).  He reports jaundice, generalized weakness, nausea, diarrhea, and decreased appetite since Sept 2018.  He is from [place], [place], and Dr. Sharma (patients physician) recommended bringing him to hospital for evaluation.  Patient denies any fever, chills, vomiting, chest pain, palpitations, SOB, abdominal pain.      Nephrology consulted for evaluation/management Adri.     Interval History: Patient evaluated bedside, in no acute distress, tolerating well treatment.  Night was uneventful.      Review of patient's allergies indicates:   Allergen Reactions    Penicillins Nausea And Vomiting and Rash     Full body rash from cefepime as well     Current Facility-Administered Medications   Medication Frequency    0.9%  NaCl infusion Once    albuterol-ipratropium 2.5 mg-0.5 mg/3 mL nebulizer solution 3 mL Q4H PRN    albuterol-ipratropium 2.5 mg-0.5 mg/3 mL nebulizer solution 3 mL Q4H WAKE    dextrose 50% injection 12.5 g PRN    dextrose 50% injection 25 g PRN    ergocalciferol capsule 50,000 Units Q7 Days    famotidine tablet 20 mg QHS    glucagon (human recombinant) injection 1 mg PRN    glucose chewable tablet 16 g PRN    glucose chewable tablet 24 g PRN    heparin (porcine) injection 1,000 Units PRN    [START ON 12/1/2018] heparin (porcine) injection 5,000 Units Q8H    insulin aspart U-100 pen 0-5 Units QID (AC + HS) PRN    isavuconazonium sulfate Cap 372 mg Daily    lidocaine HCL 10 mg/ml (1%) injection 1 mL Once    linezolid tablet 600 mg Q12H     midodrine tablet 10 mg TID PRN    ondansetron injection 4 mg Q6H PRN    oxyCODONE immediate release tablet 5 mg Q4H PRN    oxyCODONE immediate release tablet Tab 10 mg Q4H PRN    predniSONE tablet 15 mg Daily    Followed by    [START ON 12/3/2018] predniSONE tablet 10 mg Daily    Followed by    [START ON 12/10/2018] predniSONE tablet 5 mg Daily    Followed by    [START ON 12/17/2018] predniSONE tablet 2.5 mg Daily    sodium bicarbonate tablet 650 mg BID    sulfamethoxazole-trimethoprim 400-80mg per tablet 1 tablet Twice Weekly    tacrolimus capsule 6 mg BID    trazodone split tablet 25 mg Nightly PRN    traZODone tablet 50 mg QHS    ursodiol capsule 300 mg BID    valganciclovir 50 mg/ml oral solution 100 mg Once per day on Mon Wed Fri       Objective:     Vital Signs (Most Recent):  Temp: 98.2 °F (36.8 °C) (11/30/18 1152)  Pulse: 101 (11/30/18 1120)  Resp: 18 (11/30/18 1120)  BP: 125/83 (11/30/18 0800)  SpO2: 97 % (11/30/18 1120)  O2 Device (Oxygen Therapy): room air (11/30/18 1120) Vital Signs (24h Range):  Temp:  [98 °F (36.7 °C)-98.2 °F (36.8 °C)] 98.2 °F (36.8 °C)  Pulse:  [] 101  Resp:  [18-24] 18  SpO2:  [97 %-99 %] 97 %  BP: (109-125)/(74-83) 125/83     Weight: 83.1 kg (183 lb 3.2 oz) (11/29/18 0500)  Body mass index is 26.29 kg/m².  Body surface area is 2.03 meters squared.    I/O last 3 completed shifts:  In: 420 [P.O.:420]  Out: 285 [Urine:225; Drains:60]    Physical Exam  Constitutional: He appears well-developed.   HENT:   Head: Normocephalic and atraumatic.   Eyes: EOM are normal.   Neck: No JVD present.   Cardiovascular: Normal rate and regular rhythm. Exam reveals no friction rub.   Pulmonary/Chest: Effort normal and breath sounds normal.   Abdominal: Soft. He exhibits distension.   Musculoskeletal: He exhibits no edema.   Neurological: He is alert.   Skin: Skin is warm.   Nursing notes and vitals reviewed.    Significant Labs:  CBC:   Recent Labs   Lab 11/30/18  0723   WBC 18.23*    RBC 2.67*   HGB 7.9*   HCT 25.1*      MCV 94   MCH 29.6   MCHC 31.5*     CMP:   Recent Labs   Lab 11/30/18  0723   *   CALCIUM 7.9*   ALBUMIN 1.8*   PROT 4.9*   *   K 4.2   CO2 22*      BUN 54*   CREATININE 3.4*   ALKPHOS 337*   ALT 44   AST 31   BILITOT 3.6*     All labs within the past 24 hours have been reviewed.     Significant Imaging:  CXR personally reviewed.    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   - s/p OHLTx 11/11/2018; intra-op SLED  - remaining anuric, but clearance numbers look ok, on SLED overnight  -hemodynamically stable, off pressors    -despite minimal UOP, remaining net negative due to drains, clearance is ok    Plan:  - Will perform HD today for metabolic clearance and volume management  - UF goal 500mL-1L  - Continue with strict I/Os and chart  - Monitor urine output  - Will follow closely             Thank you for your consult. I will follow-up with patient. Please contact us if you have any additional questions.    Charanjit Swann MD  Nephrology  [facility]    ATTENDING PHYSICIAN ATTESTATION  I have personally interviewed and examined the patient. I thoroughly reviewed the demographic, clinical, laboratorial and imaging information available in medical records. I agree with the assessment and recommendations provided by the subspecialty resident. Dr. العلي was under my supervision.

## 2018-11-30 NOTE — ASSESSMENT & PLAN NOTE
- Cont with bactrim and valcyte for protection against opportunistic infections.   - cont Isavuconazonium.   Statement Selected

## 2018-11-30 NOTE — PT/OT/SLP PROGRESS
Occupational Therapy      Patient Name:  Femi Enciso   MRN:  [id number]    Occupation Therapy visit attempted at 2:30pm. Upon attempt, transport present to take pt off floor for thoracentesis and HD. Will follow-up as scheduled.    Julia ladd OT  11/30/2018

## 2018-11-30 NOTE — PROGRESS NOTES
" [facility]  Adult Nutrition  Progress Note     SUMMARY       Recommendations  Recommendation/Intervention:   1. Encourage increased PO intake and OS intake as tolerated.   2. Dietitian Following    Goals:   Patient to meet >85% of EEN/EPN  Nutrition Goal Status: progressing towards goal  Communication of RD Recs: (POC)    General Information Comments: Patient reports appetite is slowly returning, consuming Boost Plus. Dislike Novasource. Family provides encouragement and supplemental shakes. Denies N/V. Post transplant education provided. Caregivers present. NFPE completed 10/31, patient with moderate malnutrition.     Nutrition Discharge Planning: Post transplant nutrition education provided. Food safety/drug interactions emphasized. General healthy diet recommended. Dietitian name/contact information, education material left.  No other needs identified. Caregiver present.     Reason for Assessment  Reason for Assessment: RD follow-up  Diagnosis: transplant/postoperative complications(s/p OLTx )  Relevant Medical History: ESLD 2/2 alcoholic cirrhosis, ESRD on HD, DM2  Interdisciplinary Rounds: attended    Nutrition Risk Screen  Nutrition Risk Screen: no indicators present    Nutrition/Diet History  Patient Reported Diet/Restrictions/Preferences: low salt, general  Food Preferences: noted  Do you have any cultural, spiritual, Islam conflicts, given your current situation?: none  Factors Affecting Nutritional Intake: decreased appetite    Anthropometrics  Temp: 98 °F (36.7 °C)  Height Method: Stated  Height: 5' 10" (177.8 cm)  Height (inches): 70 in  Weight Method: Bed Scale  Weight: 83.1 kg (183 lb 3.2 oz)  Weight (lb): 183.2 lb  Ideal Body Weight (IBW), Male: 166 lb  % Ideal Body Weight, Male (lb): 119.13 lb  BMI (Calculated): 28.4  BMI Grade: 25 - 29.9 - overweight  Usual Body Weight (UBW), k.5 kg  % Usual Body Weight: 86.67  % Weight Change From Usual Weight: -13.51 %   "   Lab/Procedures/Meds  Labs: Reviewed    (L) 11/30/2018    K 4.2 11/30/2018    BUN 54 (H) 11/30/2018    CREATININE 3.4 (H) 11/30/2018    CALCIUM 7.9 (L) 11/30/2018    PHOS 4.0 11/30/2018    MG 1.7 11/26/2018    ESTGFRAFRICA 22.5 (A) 11/30/2018    EGFRNONAA 19.5 (A) 11/30/2018    ALBUMIN 1.8 (L) 11/30/2018      ALT 44 11/30/2018    AST 31 11/30/2018    ALKPHOS 337 (H) 11/30/2018      HGBA1C 4.4 11/09/2018     Meds: Reviewed  Scheduled Meds:   sodium chloride 0.9%   Intravenous Once    albuterol-ipratropium  3 mL Nebulization Q4H WAKE    ergocalciferol  50,000 Units Oral Q7 Days    famotidine  20 mg Oral QHS    heparin (porcine)  5,000 Units Subcutaneous Q8H    isavuconazonium sulfate  372 mg Oral Daily    lidocaine HCL 10 mg/ml (1%)  1 mL Intradermal Once    linezolid  600 mg Oral Q12H    midodrine  5 mg Oral Once    predniSONE  15 mg Oral Daily     Physical Findings/Assessment  Overall Physical Appearance: loss of muscle mass, loss of subcutaneous fat  Oral/Mouth Cavity: tooth/teeth missing  Skin: edema, incision(s), skin tear(Skin Tear Lower leg)    Estimated/Assessed Needs  Weight Used For Calorie Calculations: 83.7 kg (184 lb 8.4 oz)(dosing weight)  Energy Calorie Requirements (kcal): 2110 kcal/day  Energy Need Method: Adalberto-St [place]fall(x 1.25)  Protein Requirements:  g/day(1.0-1.2 g/kg)  Weight Used For Protein Calculations: 83.7 kg (184 lb 8.4 oz)(dosing weight)  Fluid Requirements (mL): 1 mL/kcal or per MD  Fluid Need Method: RDA Method  RDA Method (mL): 2110     Nutrition Prescription Ordered  Current Diet Order: Renal  Nutrition Order Comments: -  Current Nutrition Support Formula Ordered: (-)  Current Nutrition Support Rate Ordered: 0 (ml)  Current Nutrition Support Frequency Ordered: -  Oral Nutrition Supplement: Boost Plus    Evaluation of Received Nutrient/Fluid Intake in last 24h  I/O: +3.4L since 11/16/18  Comments: LBM 11/25  % Intake of Estimated Energy Needs: 50 - 75 %  % Meal  Intake: 25 - 50 %    Nutrition Risk  Level of Risk/Frequency of Follow-up: low(1x week)     Assessment and Plan  NFPE 10/31/18  Moderate malnutrition    Nutrition Problem  Malnutrition in the context of Chronic Illness/Injury     Related to (etiology):  Cirrhosis and poor appetite     Signs and Symptoms (as evidenced by):  Energy Intake: <75% of estimated energy requirement for 2 months  Body Fat Depletion: moderate depletion of orbitals and triceps   Muscle Mass Depletion: moderate depletion of temples, clavicle region and scapular region   Weight Loss: 14% x 2 months (per patient, unable to verify per chart review)     Nutrition Diagnosis Status:  Continues      Monitor and Evaluation  Food and Nutrient Intake: energy intake, food and beverage intake  Food and Nutrient Adminstration: diet order, enteral and parenteral nutrition administration  Knowledge/Beliefs/Attitudes: food and nutrition knowledge/skill  Physical Activity and Function: nutrition-related ADLs and IADLs  Anthropometric Measurements: weight, weight change  Biochemical Data, Medical Tests and Procedures: electrolyte and renal panel, gastrointestinal profile, glucose/endocrine profile, inflammatory profile  Nutrition-Focused Physical Findings: overall appearance     Nutrition Follow-Up  RD Follow-up?: Yes

## 2018-11-30 NOTE — PROGRESS NOTES
Thoracentesis complete. Patient tolerated without any acute distress noted.  1400 MLs removed. Specimen sent to lab. Dressing applied to R mid back puncture site, dressing clean dry and intact. Report called to Inpatient nurse Saad.

## 2018-11-30 NOTE — ASSESSMENT & PLAN NOTE
- Significant increase in WBC post-op.   - OR cultures from 11/18 noted with enterococcus VRE.   - Abdominal CT performed at that time with fluid collection and drain placed on 11/22 for drainage.   - Intra-abdominal abscess culture also with enterococcus VRE.   - ID consulted and pt placed on antibxs for treatment. Pt was on Cefepime, Daptomycin, and Fluconazole for treatment.    - Pt remains with RLQ drains (one JOSE A and one Percutaneous).  One JOSE A removed 11/28.  Perc drain in placed draining tan, clear drainage.  WBC slowly improving.   - Liver US on 11/26 reviewed.   - Abdominal CT performed 11/27 and reviewed. Fluid collection noted with improvement on CT.  ID following and reassured by findings.    - Dapto, Cefepime and Flagyl changed 11/30/18 to Linezolid 600 mg PO q 12 hr x 10 days per ID.

## 2018-11-30 NOTE — ASSESSMENT & PLAN NOTE
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   - s/p OHLTx 11/11/2018; intra-op SLED  - remaining anuric, but clearance numbers look ok, on SLED overnight  -hemodynamically stable, off pressors    -despite minimal UOP, remaining net negative due to drains, clearance is ok    Plan:  - Will perform HD today for metabolic clearance and volume management  - UF goal 500mL-1L  - Continue with strict I/Os and chart  - Monitor urine output  - Will follow closely

## 2018-12-01 LAB
ALBUMIN SERPL BCP-MCNC: 1.6 G/DL
ALP SERPL-CCNC: 307 U/L
ALT SERPL W/O P-5'-P-CCNC: 37 U/L
ANION GAP SERPL CALC-SCNC: 8 MMOL/L
APPEARANCE FLD: NORMAL
AST SERPL-CCNC: 27 U/L
BACTERIA #/AREA URNS AUTO: ABNORMAL /HPF
BASOPHILS # BLD AUTO: 0.12 K/UL
BASOPHILS NFR BLD: 0.7 %
BILIRUB SERPL-MCNC: 3.3 MG/DL
BILIRUB UR QL STRIP: ABNORMAL
BODY FLD TYPE: NORMAL
BUN SERPL-MCNC: 33 MG/DL
CALCIUM SERPL-MCNC: 8.2 MG/DL
CHLORIDE SERPL-SCNC: 104 MMOL/L
CLARITY UR REFRACT.AUTO: ABNORMAL
CO2 SERPL-SCNC: 24 MMOL/L
COLOR FLD: NORMAL
COLOR UR AUTO: ABNORMAL
CREAT SERPL-MCNC: 2.4 MG/DL
DIFFERENTIAL METHOD: ABNORMAL
EOSINOPHIL # BLD AUTO: 0.7 K/UL
EOSINOPHIL NFR BLD: 4.1 %
ERYTHROCYTE [DISTWIDTH] IN BLOOD BY AUTOMATED COUNT: 20.3 %
EST. GFR  (AFRICAN AMERICAN): 34.3 ML/MIN/1.73 M^2
EST. GFR  (NON AFRICAN AMERICAN): 29.7 ML/MIN/1.73 M^2
GLUCOSE SERPL-MCNC: 138 MG/DL
GLUCOSE UR QL STRIP: NEGATIVE
HCT VFR BLD AUTO: 23.8 %
HGB BLD-MCNC: 7.4 G/DL
HGB UR QL STRIP: ABNORMAL
HYALINE CASTS UR QL AUTO: 0 /LPF
IMM GRANULOCYTES # BLD AUTO: 0.23 K/UL
IMM GRANULOCYTES NFR BLD AUTO: 1.3 %
INR PPP: 1.1
KETONES UR QL STRIP: NEGATIVE
LEUKOCYTE ESTERASE UR QL STRIP: ABNORMAL
LYMPHOCYTES # BLD AUTO: 0.7 K/UL
LYMPHOCYTES NFR BLD: 4.1 %
MAGNESIUM SERPL-MCNC: 1.8 MG/DL
MCH RBC QN AUTO: 29.7 PG
MCHC RBC AUTO-ENTMCNC: 31.1 G/DL
MCV RBC AUTO: 96 FL
MICROSCOPIC COMMENT: ABNORMAL
MONOCYTES # BLD AUTO: 0.5 K/UL
MONOCYTES NFR BLD: 2.6 %
MONOS+MACROS NFR FLD MANUAL: 7 %
NEUTROPHILS # BLD AUTO: 15.5 K/UL
NEUTROPHILS NFR BLD: 87.2 %
NEUTROPHILS NFR FLD MANUAL: 93 %
NITRITE UR QL STRIP: NEGATIVE
NRBC BLD-RTO: 0 /100 WBC
PH UR STRIP: 5 [PH] (ref 5–8)
PHOSPHATE SERPL-MCNC: 3.4 MG/DL
PLATELET # BLD AUTO: 287 K/UL
PMV BLD AUTO: 12.9 FL
POCT GLUCOSE: 187 MG/DL (ref 70–110)
POCT GLUCOSE: 218 MG/DL (ref 70–110)
POCT GLUCOSE: 226 MG/DL (ref 70–110)
POTASSIUM SERPL-SCNC: 4.3 MMOL/L
PROT SERPL-MCNC: 4.9 G/DL
PROT UR QL STRIP: ABNORMAL
PROTHROMBIN TIME: 11.5 SEC
RBC # BLD AUTO: 2.49 M/UL
RBC #/AREA URNS AUTO: 8 /HPF (ref 0–4)
SODIUM SERPL-SCNC: 136 MMOL/L
SP GR UR STRIP: 1.03 (ref 1–1.03)
SQUAMOUS #/AREA URNS AUTO: 1 /HPF
TACROLIMUS BLD-MCNC: 14.4 NG/ML
URN SPEC COLLECT METH UR: ABNORMAL
WBC # BLD AUTO: 17.77 K/UL
WBC # FLD: 3969 /CU MM
WBC #/AREA URNS AUTO: 18 /HPF (ref 0–5)

## 2018-12-01 PROCEDURE — 94761 N-INVAS EAR/PLS OXIMETRY MLT: CPT

## 2018-12-01 PROCEDURE — 25000003 PHARM REV CODE 250: Performed by: STUDENT IN AN ORGANIZED HEALTH CARE EDUCATION/TRAINING PROGRAM

## 2018-12-01 PROCEDURE — 25000003 PHARM REV CODE 250: Performed by: NURSE PRACTITIONER

## 2018-12-01 PROCEDURE — 63600175 PHARM REV CODE 636 W HCPCS: Performed by: NURSE PRACTITIONER

## 2018-12-01 PROCEDURE — 85610 PROTHROMBIN TIME: CPT

## 2018-12-01 PROCEDURE — 80074 ACUTE HEPATITIS PANEL: CPT

## 2018-12-01 PROCEDURE — 99232 SBSQ HOSP IP/OBS MODERATE 35: CPT | Mod: ,,, | Performed by: NURSE PRACTITIONER

## 2018-12-01 PROCEDURE — 63600175 PHARM REV CODE 636 W HCPCS: Performed by: STUDENT IN AN ORGANIZED HEALTH CARE EDUCATION/TRAINING PROGRAM

## 2018-12-01 PROCEDURE — 94664 DEMO&/EVAL PT USE INHALER: CPT

## 2018-12-01 PROCEDURE — 83735 ASSAY OF MAGNESIUM: CPT

## 2018-12-01 PROCEDURE — 99900035 HC TECH TIME PER 15 MIN (STAT)

## 2018-12-01 PROCEDURE — 81001 URINALYSIS AUTO W/SCOPE: CPT

## 2018-12-01 PROCEDURE — 99233 PR SUBSEQUENT HOSPITAL CARE,LEVL III: ICD-10-PCS | Mod: 24,,, | Performed by: NURSE PRACTITIONER

## 2018-12-01 PROCEDURE — 87086 URINE CULTURE/COLONY COUNT: CPT

## 2018-12-01 PROCEDURE — 94640 AIRWAY INHALATION TREATMENT: CPT

## 2018-12-01 PROCEDURE — 80197 ASSAY OF TACROLIMUS: CPT

## 2018-12-01 PROCEDURE — 99233 SBSQ HOSP IP/OBS HIGH 50: CPT | Mod: 24,,, | Performed by: NURSE PRACTITIONER

## 2018-12-01 PROCEDURE — 84100 ASSAY OF PHOSPHORUS: CPT

## 2018-12-01 PROCEDURE — 99232 PR SUBSEQUENT HOSPITAL CARE,LEVL II: ICD-10-PCS | Mod: ,,, | Performed by: NURSE PRACTITIONER

## 2018-12-01 PROCEDURE — 80053 COMPREHEN METABOLIC PANEL: CPT

## 2018-12-01 PROCEDURE — 25000242 PHARM REV CODE 250 ALT 637 W/ HCPCS: Performed by: NURSE PRACTITIONER

## 2018-12-01 PROCEDURE — 27000221 HC OXYGEN, UP TO 24 HOURS

## 2018-12-01 PROCEDURE — 85025 COMPLETE CBC W/AUTO DIFF WBC: CPT

## 2018-12-01 PROCEDURE — 20600001 HC STEP DOWN PRIVATE ROOM

## 2018-12-01 RX ORDER — INSULIN ASPART 100 [IU]/ML
2 INJECTION, SOLUTION INTRAVENOUS; SUBCUTANEOUS
Status: DISCONTINUED | OUTPATIENT
Start: 2018-12-01 | End: 2018-12-04

## 2018-12-01 RX ORDER — CEFEPIME HYDROCHLORIDE 1 G/1
1 INJECTION, POWDER, FOR SOLUTION INTRAMUSCULAR; INTRAVENOUS
Status: DISCONTINUED | OUTPATIENT
Start: 2018-12-01 | End: 2018-12-04

## 2018-12-01 RX ORDER — TACROLIMUS 1 MG/1
3 CAPSULE ORAL 2 TIMES DAILY
Status: DISCONTINUED | OUTPATIENT
Start: 2018-12-01 | End: 2018-12-03

## 2018-12-01 RX ORDER — TIZANIDINE 4 MG/1
4 TABLET ORAL EVERY 8 HOURS PRN
Status: DISCONTINUED | OUTPATIENT
Start: 2018-12-01 | End: 2018-12-03

## 2018-12-01 RX ORDER — CYCLOBENZAPRINE HCL 5 MG
5 TABLET ORAL 3 TIMES DAILY PRN
Status: DISCONTINUED | OUTPATIENT
Start: 2018-12-01 | End: 2018-12-01

## 2018-12-01 RX ADMIN — IPRATROPIUM BROMIDE AND ALBUTEROL SULFATE 3 ML: .5; 3 SOLUTION RESPIRATORY (INHALATION) at 07:12

## 2018-12-01 RX ADMIN — HEPARIN SODIUM 5000 UNITS: 5000 INJECTION, SOLUTION INTRAVENOUS; SUBCUTANEOUS at 09:12

## 2018-12-01 RX ADMIN — LINEZOLID 600 MG: 600 TABLET, FILM COATED ORAL at 07:12

## 2018-12-01 RX ADMIN — LINEZOLID 600 MG: 600 TABLET, FILM COATED ORAL at 09:12

## 2018-12-01 RX ADMIN — FAMOTIDINE 20 MG: 20 TABLET ORAL at 09:12

## 2018-12-01 RX ADMIN — TIZANIDINE 4 MG: 4 TABLET ORAL at 10:12

## 2018-12-01 RX ADMIN — TACROLIMUS 5 MG: 1 CAPSULE ORAL at 07:12

## 2018-12-01 RX ADMIN — HEPARIN SODIUM 5000 UNITS: 5000 INJECTION, SOLUTION INTRAVENOUS; SUBCUTANEOUS at 12:12

## 2018-12-01 RX ADMIN — SODIUM BICARBONATE 650 MG TABLET 650 MG: at 07:12

## 2018-12-01 RX ADMIN — IPRATROPIUM BROMIDE AND ALBUTEROL SULFATE 3 ML: .5; 3 SOLUTION RESPIRATORY (INHALATION) at 02:12

## 2018-12-01 RX ADMIN — URSODIOL 300 MG: 300 CAPSULE ORAL at 09:12

## 2018-12-01 RX ADMIN — INSULIN ASPART 2 UNITS: 100 INJECTION, SOLUTION INTRAVENOUS; SUBCUTANEOUS at 11:12

## 2018-12-01 RX ADMIN — OXYCODONE HYDROCHLORIDE 10 MG: 10 TABLET ORAL at 04:12

## 2018-12-01 RX ADMIN — OXYCODONE HYDROCHLORIDE 10 MG: 10 TABLET ORAL at 08:12

## 2018-12-01 RX ADMIN — CEFEPIME 1 G: 1 INJECTION, POWDER, FOR SOLUTION INTRAMUSCULAR; INTRAVENOUS at 12:12

## 2018-12-01 RX ADMIN — URSODIOL 300 MG: 300 CAPSULE ORAL at 07:12

## 2018-12-01 RX ADMIN — INSULIN ASPART 2 UNITS: 100 INJECTION, SOLUTION INTRAVENOUS; SUBCUTANEOUS at 05:12

## 2018-12-01 RX ADMIN — CEFEPIME 1 G: 1 INJECTION, POWDER, FOR SOLUTION INTRAMUSCULAR; INTRAVENOUS at 09:12

## 2018-12-01 RX ADMIN — OXYCODONE HYDROCHLORIDE 10 MG: 10 TABLET ORAL at 12:12

## 2018-12-01 RX ADMIN — IPRATROPIUM BROMIDE AND ALBUTEROL SULFATE 3 ML: .5; 3 SOLUTION RESPIRATORY (INHALATION) at 11:12

## 2018-12-01 RX ADMIN — TRAZODONE HYDROCHLORIDE 50 MG: 50 TABLET ORAL at 09:12

## 2018-12-01 RX ADMIN — IPRATROPIUM BROMIDE AND ALBUTEROL SULFATE 3 ML: .5; 3 SOLUTION RESPIRATORY (INHALATION) at 08:12

## 2018-12-01 RX ADMIN — OXYCODONE HYDROCHLORIDE 10 MG: 10 TABLET ORAL at 01:12

## 2018-12-01 RX ADMIN — SODIUM BICARBONATE 650 MG TABLET 650 MG: at 05:12

## 2018-12-01 RX ADMIN — TACROLIMUS 3 MG: 1 CAPSULE ORAL at 05:12

## 2018-12-01 RX ADMIN — ISAVUCONAZONIUM SULFATE 372 MG: 186 CAPSULE ORAL at 07:12

## 2018-12-01 RX ADMIN — OXYCODONE HYDROCHLORIDE 10 MG: 10 TABLET ORAL at 07:12

## 2018-12-01 RX ADMIN — PREDNISONE 15 MG: 5 TABLET ORAL at 07:12

## 2018-12-01 NOTE — PLAN OF CARE
"Problem: Patient Care Overview  Goal: Plan of Care Review  Dx: Liver transplant (11/11)  hx: ESLD, ETOH abuse; Severe depression.    11/12: liver transplant; extubated. Products given in OR and HD in OR. CRRT nocturnal.  11/13: CRRT nocturnal held; lines removed. FFP x1 given. Pulled out 2 JOSE A drains.   11/14: 3 PRBC's given for hgb 6; liver US shows potential hematoma. Trend CBC. Extreme agitation/attempted to "kill self" by holding breath for as long as possible multiple times. IV ativan/haldol given. Nocturnal CRRT restarted  Potential washout  To be determined.    11/16: OR for exlap and washout--1U PRBCs and 1 plts; sent back to OR--3 U PRBCs, 1FFP, 1cryo; abd open and wound vac placed  11/17: CT chest, abd, pelvis, levo off 2 units PRBCs, 2 units platelets, 1 unit FFP   11/18: OR for closure, extubated   11/19: clear liquid diet   11/20: TPN/Lipids d/c'd, renal diet, transfer orders  11/21: Head and chest CT  11/22: Pt to IR for drain placement  11/23: HD trial  11/25: HD 2 Liters removed  Nursing:   -160          Outcome: Ongoing (interventions implemented as appropriate)  AAOX4. VSS. Afebrile. Not showing any new signs of infection.  Pt tolerating all treatments and therapies well at this time.  Pt pain controlled with medication.  Fall precautions in place.  Bed in low, locked position, non skid socks on while out of bed.  Pt remain free of falls during shift.  Call bell within reach.  Pt instructed to push call bell when assistance needed.  Pt verbalized understanding.  Will continue to monitor.          "

## 2018-12-01 NOTE — PROGRESS NOTES
Hemodialysis    Bedside HD treatment in room 41061B, patient is awake, alert, oriented, hooked to oxygen via nasal cannula, saturating well.    ACCESS: right subclavian permanent catheter, with good flow on both ports.  HD started

## 2018-12-01 NOTE — ASSESSMENT & PLAN NOTE
- c/o increased pain w deep breath today to right side.  CXR showed increased opacity in the inferior hemothorax on the right side since 11/26/2018.  Pulse ox 97-99% on RA.  Cont to monitor    - continues to have fluid to RLL.  WBC remains elevated.    - thoracentesis performed on 11/30. Fluid noted with 4k WBC. Cefepime started for treatment and will consider PO options.

## 2018-12-01 NOTE — SUBJECTIVE & OBJECTIVE
Scheduled Meds:   sodium chloride 0.9%   Intravenous Once    albuterol-ipratropium  3 mL Nebulization Q4H WAKE    ceFEPime (MAXIPIME) IVPB  1 g Intravenous Q12H    ergocalciferol  50,000 Units Oral Q7 Days    famotidine  20 mg Oral QHS    heparin (porcine)  5,000 Units Subcutaneous Q8H    insulin aspart U-100  2 Units Subcutaneous TIDWM    isavuconazonium sulfate  372 mg Oral Daily    lidocaine HCL 10 mg/ml (1%)  1 mL Intradermal Once    linezolid  600 mg Oral Q12H    predniSONE  15 mg Oral Daily    Followed by    [START ON 12/3/2018] predniSONE  10 mg Oral Daily    Followed by    [START ON 12/10/2018] predniSONE  5 mg Oral Daily    Followed by    [START ON 12/17/2018] predniSONE  2.5 mg Oral Daily    sodium bicarbonate  650 mg Oral BID    sulfamethoxazole-trimethoprim 400-80mg  1 tablet Oral Twice Weekly    tacrolimus  3 mg Oral BID    traZODone  50 mg Oral QHS    ursodiol  300 mg Oral BID    valganciclovir 50 mg/ml  100 mg Oral Once per day on Mon Wed Fri     Continuous Infusions:  PRN Meds:sodium chloride 0.9%, albuterol-ipratropium, dextrose 50%, dextrose 50%, glucagon (human recombinant), glucose, glucose, heparin (porcine), insulin aspart U-100, midodrine, ondansetron, oxyCODONE, oxyCODONE, tiZANidine, traZODone    Review of Systems   Constitutional: Positive for activity change, appetite change (improving) and fatigue. Negative for fever.   HENT: Negative.  Negative for facial swelling.    Eyes: Negative.    Respiratory: Positive for shortness of breath (w exertion). Negative for apnea, chest tightness and wheezing.    Cardiovascular: Negative.  Negative for chest pain, palpitations and leg swelling.   Gastrointestinal: Positive for abdominal distention and abdominal pain. Negative for constipation, diarrhea, nausea and vomiting.   Genitourinary: Negative.  Negative for decreased urine volume, difficulty urinating, dysuria, hematuria and urgency.   Musculoskeletal: Negative.  Negative for  back pain, gait problem, neck pain and neck stiffness.   Skin: Positive for wound. Negative for color change and pallor.   Allergic/Immunologic: Positive for immunocompromised state.   Neurological: Positive for weakness. Negative for dizziness, seizures, light-headedness and headaches.   Psychiatric/Behavioral: Negative for behavioral problems, confusion, decreased concentration, hallucinations, sleep disturbance and suicidal ideas. The patient is not nervous/anxious.      Objective:     Vital Signs (Most Recent):  Temp: 98 °F (36.7 °C) (12/01/18 1606)  Pulse: (!) 115 (12/01/18 1600)  Resp: 18 (12/01/18 1600)  BP: 110/80 (12/01/18 1600)  SpO2: 97 % (12/01/18 1600) Vital Signs (24h Range):  Temp:  [97.2 °F (36.2 °C)-98 °F (36.7 °C)] 98 °F (36.7 °C)  Pulse:  [] 115  Resp:  [15-31] 18  SpO2:  [94 %-100 %] 97 %  BP: ()/(60-84) 110/80     Weight: 83.1 kg (183 lb 3.2 oz)  Body mass index is 26.29 kg/m².    Intake/Output - Last 3 Shifts       11/29 0700 - 11/30 0659 11/30 0700 - 12/01 0659 12/01 0700 - 12/02 0659    P.O. 180 540 860    I.V. (mL/kg) 0 (0)      Other 0 500     IV Piggyback       Total Intake(mL/kg) 180 (2.2) 1040 (12.5) 860 (10.3)    Urine (mL/kg/hr) 225 (0.1) 150 (0.1)     Drains       Other  2900     Stool 0      Total Output 225 3050     Net -45 -2010 +860           Urine Occurrence 0 x      Stool Occurrence 1 x            Physical Exam   Constitutional: He is oriented to person, place, and time. He appears well-developed. No distress.   HENT:   Head: Normocephalic and atraumatic.   Eyes: Pupils are equal, round, and reactive to light. Scleral icterus is present.   Neck: Normal range of motion. Neck supple. No JVD present.   Cardiovascular: Normal rate, regular rhythm and normal heart sounds.   No murmur heard.  Pulmonary/Chest: Effort normal. No respiratory distress. He has decreased breath sounds in the right lower field and the left lower field. He has no wheezes. He exhibits no  tenderness.   Abdominal: Soft. Bowel sounds are normal. He exhibits no distension. There is tenderness.   Chevron inc ECTOR w/ staples  RLQ JOSE A drain and percutaneous drain site w suture CDI.    Musculoskeletal: Normal range of motion. He exhibits edema. He exhibits no tenderness.   Neurological: He is alert and oriented to person, place, and time. He has normal reflexes.   Skin: Skin is warm and dry. He is not diaphoretic.   Psychiatric: He has a normal mood and affect. His behavior is normal. Judgment and thought content normal.   Nursing note and vitals reviewed.      Laboratory:  Immunosuppressants         Stop Route Frequency     tacrolimus capsule 3 mg      -- Oral 2 times daily        CBC:   Recent Labs   Lab 12/01/18  0722   WBC 17.77*   RBC 2.49*   HGB 7.4*   HCT 23.8*      MCV 96   MCH 29.7   MCHC 31.1*     CMP:   Recent Labs   Lab 12/01/18  0722   *   CALCIUM 8.2*   ALBUMIN 1.6*   PROT 4.9*      K 4.3   CO2 24      BUN 33*   CREATININE 2.4*   ALKPHOS 307*   ALT 37   AST 27   BILITOT 3.3*     Coagulation:   Recent Labs   Lab 11/29/18  0703  12/01/18  0722   INR 1.2   < > 1.1   APTT 30.9  --   --     < > = values in this interval not displayed.     Tacrolimus Lvl   Date Value Ref Range Status   12/01/2018 14.4 5.0 - 15.0 ng/mL Final     Comment:     Testing performed by Liquid Chromatography-Tandem  Mass Spectrometry (LC-MS/MS).  This test was developed and its performance characteristics  determined by [facility], Department of Pathology  and Laboratory Medicine in a manner consistent with CLIA  requirements. It has not been cleared or approved by the [place]  Food and Drug Administration.  This test is used for clinical   purposes.  It should not be regarded as investigational or for  research.     11/30/2018 13.8 5.0 - 15.0 ng/mL Final     Comment:     Testing performed by Liquid Chromatography-Tandem  Mass Spectrometry (LC-MS/MS).  This test was developed and its  performance characteristics  determined by [facility], Department of Pathology  and Laboratory Medicine in a manner consistent with CLIA  requirements. It has not been cleared or approved by the [place]  Food and Drug Administration.  This test is used for clinical   purposes.  It should not be regarded as investigational or for  research.     11/29/2018 9.3 5.0 - 15.0 ng/mL Final     Comment:     Testing performed by Liquid Chromatography-Tandem  Mass Spectrometry (LC-MS/MS).  This test was developed and its performance characteristics  determined by [facility], Department of Pathology  and Laboratory Medicine in a manner consistent with CLIA  requirements. It has not been cleared or approved by the [place]  Food and Drug Administration.  This test is used for clinical   purposes.  It should not be regarded as investigational or for  research.     11/28/2018 6.9 5.0 - 15.0 ng/mL Final     Comment:     Testing performed by Liquid Chromatography-Tandem  Mass Spectrometry (LC-MS/MS).  This test was developed and its performance characteristics  determined by [facility], Department of Pathology  and Laboratory Medicine in a manner consistent with CLIA  requirements. It has not been cleared or approved by the [place]  Food and Drug Administration.  This test is used for clinical   purposes.  It should not be regarded as investigational or for  research.           Labs within the past 24 hours have been reviewed.    Diagnostic Results:  None

## 2018-12-01 NOTE — PROGRESS NOTES
[facility]  Liver Transplant  Progress Note    Patient Name: Femi Enciso  MRN: [id number]  Admission Date: 10/27/2018  Hospital Length of Stay: 35 days  Code Status: Full Code  Primary Care Provider: Primary Doctor No  Post-Operative Day: 20    ORGAN:   LIVER  Disease Etiology: Alcoholic Cirrhosis  Donor Type:    - Brain Death  CDC High Risk:   No  Donor CMV Status:   Donor CMV Status: Positive  Donor HBcAB:   Negative  Donor HCV Status:   Negative  Donor HBV JAVIER: Negative  Donor HCV JAVIER: Negative  Whole or Partial: Whole Liver  Biliary Anastomosis: End to End  Arterial Anatomy: Standard  Subjective:     History of Present Illness:  Femi Enciso is a 53yr old male with PMH of acute ETOH hepatitis and DEL/ATN evolved to ESRD requiring HD (not candidate for KTX). He is now s/p liver transplant (SM induction, DBD, CMV D+/R-). Transplant surgery noted severe collateralization, adrenal gland firmly adhered to liver. Bare area of adrenal gland required several stitches and packing to obtain fair hemostasis (EBL 15L). OR take back  for bleeding from R adrenal bed in AM (significant clot in retroperitoneum, posterior to R hepatic lobe, tract posterior to the R kidney containing significant clot, small bleeding area of retroperitoneal fat) then returned to surgery same day for hemorrhaging closed with wound vac with plans to return to OR 24-48 hours for closure (retroperitoneal /retrorenal/retrocolic hematoma on the right ). Raw retroperitoneal bleeding. Suture ligatures placed, argon beam cautery, evarest placed in retroperitoneum behind cava and right kidney. Significant oozing from upper right adrenal gland, not amenable to ligation, that portion of the adrenal gland was resected with cautery). OR take back  for washout and incisional closure, no significant bleeding or hematoma found. OR cultures   from body fluid grew enterococcus faecium VRE. ID was consulted,  started on  daptomycin for VRE treatment and cefepime/flagyl to cover for IA ty in bile. Patient with significant leukocytosis with peak on 11/22 at 48K prompting drainage of R subphrenic fluid collection, perc drain placed, culture grew VRE. Stroke code called 11/21 for lethargy and unresponsive, CT head without evidence of acute ischemic changes and CTA chest negative, vascular neurology was consulted recommended MRI brain, but patient's AMS improved shortly after event. Nephrology consulted for ESRD requiring HD. Patient overall improved on antibiotic regimen, leukocytosis and AMS improved. He was transferred to [facility] on POD #15.     Hospital Course:  Interval History:  No acute events overnight.  Pt feeling better this am. Thoracentesis performed 11/30 and fluid noted with 4k WBCs. resp status slightly better this am. Cont with trazodone. AOx4. His appetite is slowly improving. Last perc drain removed 11/29.  Chevron w staples CDI, no drainage. LFTs are trending down. Cr level still elevated and requiring HD intermittently.ID is following and based off of CT A/P changed abx from Dapto, Cefepime, and Flagyl to Linezolid 600 mg PO q 12 hr (started 11/29)for 10 days (tentative stop date 12/10/18). Cefepime started for recent pleural effusion. PT/OT following. Patient also doing exercises in room w CG.  He remains significantly debilitated. Monitor.       Scheduled Meds:   sodium chloride 0.9%   Intravenous Once    albuterol-ipratropium  3 mL Nebulization Q4H WAKE    ceFEPime (MAXIPIME) IVPB  1 g Intravenous Q12H    ergocalciferol  50,000 Units Oral Q7 Days    famotidine  20 mg Oral QHS    heparin (porcine)  5,000 Units Subcutaneous Q8H    insulin aspart U-100  2 Units Subcutaneous TIDWM    isavuconazonium sulfate  372 mg Oral Daily    lidocaine HCL 10 mg/ml (1%)  1 mL Intradermal Once    linezolid  600 mg Oral Q12H    predniSONE  15 mg Oral Daily    Followed by    [START ON 12/3/2018] predniSONE  10 mg Oral Daily     Followed by    [START ON 12/10/2018] predniSONE  5 mg Oral Daily    Followed by    [START ON 12/17/2018] predniSONE  2.5 mg Oral Daily    sodium bicarbonate  650 mg Oral BID    sulfamethoxazole-trimethoprim 400-80mg  1 tablet Oral Twice Weekly    tacrolimus  3 mg Oral BID    traZODone  50 mg Oral QHS    ursodiol  300 mg Oral BID    valganciclovir 50 mg/ml  100 mg Oral Once per day on Mon Wed Fri     Continuous Infusions:  PRN Meds:sodium chloride 0.9%, albuterol-ipratropium, dextrose 50%, dextrose 50%, glucagon (human recombinant), glucose, glucose, heparin (porcine), insulin aspart U-100, midodrine, ondansetron, oxyCODONE, oxyCODONE, tiZANidine, traZODone    Review of Systems   Constitutional: Positive for activity change, appetite change (improving) and fatigue. Negative for fever.   HENT: Negative.  Negative for facial swelling.    Eyes: Negative.    Respiratory: Positive for shortness of breath (w exertion). Negative for apnea, chest tightness and wheezing.    Cardiovascular: Negative.  Negative for chest pain, palpitations and leg swelling.   Gastrointestinal: Positive for abdominal distention and abdominal pain. Negative for constipation, diarrhea, nausea and vomiting.   Genitourinary: Negative.  Negative for decreased urine volume, difficulty urinating, dysuria, hematuria and urgency.   Musculoskeletal: Negative.  Negative for back pain, gait problem, neck pain and neck stiffness.   Skin: Positive for wound. Negative for color change and pallor.   Allergic/Immunologic: Positive for immunocompromised state.   Neurological: Positive for weakness. Negative for dizziness, seizures, light-headedness and headaches.   Psychiatric/Behavioral: Negative for behavioral problems, confusion, decreased concentration, hallucinations, sleep disturbance and suicidal ideas. The patient is not nervous/anxious.      Objective:     Vital Signs (Most Recent):  Temp: 98 °F (36.7 °C) (12/01/18 1606)  Pulse: (!) 115  (12/01/18 1600)  Resp: 18 (12/01/18 1600)  BP: 110/80 (12/01/18 1600)  SpO2: 97 % (12/01/18 1600) Vital Signs (24h Range):  Temp:  [97.2 °F (36.2 °C)-98 °F (36.7 °C)] 98 °F (36.7 °C)  Pulse:  [] 115  Resp:  [15-31] 18  SpO2:  [94 %-100 %] 97 %  BP: ()/(60-84) 110/80     Weight: 83.1 kg (183 lb 3.2 oz)  Body mass index is 26.29 kg/m².    Intake/Output - Last 3 Shifts       11/29 0700 - 11/30 0659 11/30 0700 - 12/01 0659 12/01 0700 - 12/02 0659    P.O. 180 540 860    I.V. (mL/kg) 0 (0)      Other 0 500     IV Piggyback       Total Intake(mL/kg) 180 (2.2) 1040 (12.5) 860 (10.3)    Urine (mL/kg/hr) 225 (0.1) 150 (0.1)     Drains       Other  2900     Stool 0      Total Output 225 3050     Net -45 -2010 +860           Urine Occurrence 0 x      Stool Occurrence 1 x            Physical Exam   Constitutional: He is oriented to person, place, and time. He appears well-developed. No distress.   HENT:   Head: Normocephalic and atraumatic.   Eyes: Pupils are equal, round, and reactive to light. Scleral icterus is present.   Neck: Normal range of motion. Neck supple. No JVD present.   Cardiovascular: Normal rate, regular rhythm and normal heart sounds.   No murmur heard.  Pulmonary/Chest: Effort normal. No respiratory distress. He has decreased breath sounds in the right lower field and the left lower field. He has no wheezes. He exhibits no tenderness.   Abdominal: Soft. Bowel sounds are normal. He exhibits no distension. There is tenderness.   Chevron inc ECTOR w/ staples  RLQ JOSE A drain and percutaneous drain site w suture CDI.    Musculoskeletal: Normal range of motion. He exhibits edema. He exhibits no tenderness.   Neurological: He is alert and oriented to person, place, and time. He has normal reflexes.   Skin: Skin is warm and dry. He is not diaphoretic.   Psychiatric: He has a normal mood and affect. His behavior is normal. Judgment and thought content normal.   Nursing note and vitals  reviewed.      Laboratory:  Immunosuppressants         Stop Route Frequency     tacrolimus capsule 3 mg      -- Oral 2 times daily        CBC:   Recent Labs   Lab 12/01/18  0722   WBC 17.77*   RBC 2.49*   HGB 7.4*   HCT 23.8*      MCV 96   MCH 29.7   MCHC 31.1*     CMP:   Recent Labs   Lab 12/01/18  0722   *   CALCIUM 8.2*   ALBUMIN 1.6*   PROT 4.9*      K 4.3   CO2 24      BUN 33*   CREATININE 2.4*   ALKPHOS 307*   ALT 37   AST 27   BILITOT 3.3*     Coagulation:   Recent Labs   Lab 11/29/18  0703  12/01/18  0722   INR 1.2   < > 1.1   APTT 30.9  --   --     < > = values in this interval not displayed.     Tacrolimus Lvl   Date Value Ref Range Status   12/01/2018 14.4 5.0 - 15.0 ng/mL Final     Comment:     Testing performed by Liquid Chromatography-Tandem  Mass Spectrometry (LC-MS/MS).  This test was developed and its performance characteristics  determined by [facility], Department of Pathology  and Laboratory Medicine in a manner consistent with CLIA  requirements. It has not been cleared or approved by the [place]  Food and Drug Administration.  This test is used for clinical   purposes.  It should not be regarded as investigational or for  research.     11/30/2018 13.8 5.0 - 15.0 ng/mL Final     Comment:     Testing performed by Liquid Chromatography-Tandem  Mass Spectrometry (LC-MS/MS).  This test was developed and its performance characteristics  determined by [facility], Department of Pathology  and Laboratory Medicine in a manner consistent with CLIA  requirements. It has not been cleared or approved by the [place]  Food and Drug Administration.  This test is used for clinical   purposes.  It should not be regarded as investigational or for  research.     11/29/2018 9.3 5.0 - 15.0 ng/mL Final     Comment:     Testing performed by Liquid Chromatography-Tandem  Mass Spectrometry (LC-MS/MS).  This test was developed and its performance characteristics  determined by  [facility], Department of Pathology  and Laboratory Medicine in a manner consistent with CLIA  requirements. It has not been cleared or approved by the [place]  Food and Drug Administration.  This test is used for clinical   purposes.  It should not be regarded as investigational or for  research.     11/28/2018 6.9 5.0 - 15.0 ng/mL Final     Comment:     Testing performed by Liquid Chromatography-Tandem  Mass Spectrometry (LC-MS/MS).  This test was developed and its performance characteristics  determined by [facility], Department of Pathology  and Laboratory Medicine in a manner consistent with CLIA  requirements. It has not been cleared or approved by the [place]  Food and Drug Administration.  This test is used for clinical   purposes.  It should not be regarded as investigational or for  research.           Labs within the past 24 hours have been reviewed.    Diagnostic Results:  None    Assessment/Plan:     * S/P liver transplant    - 53 year old male with history of ESLD 2/2 ETOH cirrhosis who is s/p liver transplant c/b take-back x 3 for bleeding most recently 11/18.  - LFTs now improving slowly.  T bili was 37.6 day of transplant.  - Pt remains with significant debility. Pt will need SNF placement when medically stable.   - Appetite slowly improving.  Tolerating supplements.  Encourage PO intake.   - Drain placed during take back. Drain placed to intra-abdominal abscess on 11/22.   - Fluid from collection noted with enterococcus VRE. Cont with antibxs per ID.  De escalated to PO on 11/29/18.    - Abdominal pain well controlled, and having BMs.    - HD per nephrology. Last HD on 11/30.        Leucocytosis    - See intra-abdominal abscess.         Intra-abdominal abscess    - Significant increase in WBC post-op.   - OR cultures from 11/18 noted with enterococcus VRE.   - Abdominal CT performed at that time with fluid collection and drain placed on 11/22 for drainage.   - Intra-abdominal abscess  culture also with enterococcus VRE.   - ID consulted and pt placed on antibxs for treatment. Pt was on Cefepime, Daptomycin, and Fluconazole for treatment.    - Pt remains with RLQ drains (one JOSE A and one Percutaneous).  One JOSE A removed 11/28.  Perc drain in placed draining tan, clear drainage.  WBC slowly improving.   - Liver US on 11/26 reviewed.   - Abdominal CT performed 11/27 and reviewed. Fluid collection noted with improvement on CT.  ID following and reassured by findings.    - Dapto, Cefepime and Flagyl changed 11/30/18 to Linezolid 600 mg PO q 12 hr x 10 days per ID.        Weakness    - Pt remains significantly debilitated.   - PT/OT for strengthening.   - Currently will need SNF for placement and further rehabilitation.        Acute renal failure with acute tubular necrosis superimposed on stage 3 chronic kidney disease    - Renal function slow to recover post-op.   - Nephrology consulted for management.   - Cont with HD as per nephrology recs.  Apprec recs.    - HD on 11/27 w/ 2L off. Pt tolerated well.    - Lasix 160 mg challenge 11/28 w/ minimal output.    - HD performed on 11/30.   - Strict I&Os.      Anemia of chronic disease    - H&H stable. Keep type and screen current. Monitor.        At risk for opportunistic infections    - Cont with bactrim and valcyte for protection against opportunistic infections.   - cont Isavuconazonium.     Delirium    - Pt with intermittent confusion since transplant but has now improved slowly.   - Pt with some lethargy and confusion on 11/21. Head CT negative.   - AAOx3. More alert and awake on 11/27.   - AAOx4 on 11/29.  He was happy he could remember what day it is today.    - confusion continues to improve.  Seroquel d/c'd.  Trazodone for insomnia ordered w PRN dose.    - Monitor closely.        Long-term use of immunosuppressant medication    - Cont with prograf. Draw prograf level daily and adjust dose as needed to maintain a therapeutic level.        Prophylactic  immunotherapy    - See long term immunosuppression.        Type 2 diabetes mellitus without complication    - Endocrine consulted for management. Appreciate recs.        Moderate malnutrition    - muscle wasting noted.  Appetite slowly improving.  Tolerating supplements.  Dietician following.  Apprec recs.         Pleural effusion    - c/o increased pain w deep breath today to right side.  CXR showed increased opacity in the inferior hemothorax on the right side since 11/26/2018.  Pulse ox 97-99% on RA.  Cont to monitor    - continues to have fluid to RLL.  WBC remains elevated.    - thoracentesis performed on 11/30. Fluid noted with 4k WBC. Cefepime started for treatment and will consider PO options.        DEL (acute kidney injury)    - DEL for over 2 weeks prior to transplant. Nephrology was consulted.     - Post transplant, Nephrology cont to follow.  He received HD last on 11/27 w/ 2L off.     - Attempted Lasix challenge (160 mg) x1 on 11/28- pt diuresed 105 cc.    - HD 11/30 for metabolic clearance and fluid management.  Cont strict I/O's.  Monitor closely.     Hepatorenal syndrome    - del past 2 weeks.  Pt on Midodrine.  Last HD 11/9/18- 0 fluid removed 2/2 hypotension.  - Nephrology following for HD>  Pt cont to have hypotenstion worse w standing.  Midodrine 10 mg PRN for hypotension during HD ordered.           VTE Risk Mitigation (From admission, onward)        Ordered     heparin (porcine) injection 5,000 Units  Every 8 hours      11/30/18 1149     heparin (porcine) injection 1,000 Units  As needed (PRN)      11/25/18 1428     IP VTE HIGH RISK PATIENT  Once      11/11/18 1208          The patients clinical status was discussed at multidisplinary rounds, involving transplant surgery, transplant medicine, pharmacy, nursing, nutrition, and social work    Discharge Planning:  No Patient Care Coordination Note on file.      Kevin Miranda NP  Liver Transplant  [facility]

## 2018-12-01 NOTE — ASSESSMENT & PLAN NOTE
- Renal function slow to recover post-op.   - Nephrology consulted for management.   - Cont with HD as per nephrology recs.  Apprec recs.    - HD on 11/27 w/ 2L off. Pt tolerated well.    - Lasix 160 mg challenge 11/28 w/ minimal output.    - HD performed on 11/30.   - Strict I&Os.

## 2018-12-01 NOTE — PLAN OF CARE
"Problem: Patient Care Overview  Goal: Plan of Care Review  Dx: Liver transplant (11/11)  hx: ESLD, ETOH abuse; Severe depression.    11/12: liver transplant; extubated. Products given in OR and HD in OR. CRRT nocturnal.  11/13: CRRT nocturnal held; lines removed. FFP x1 given. Pulled out 2 JOSE A drains.   11/14: 3 PRBC's given for hgb 6; liver US shows potential hematoma. Trend CBC. Extreme agitation/attempted to "kill self" by holding breath for as long as possible multiple times. IV ativan/haldol given. Nocturnal CRRT restarted  Potential washout  To be determined.    11/16: OR for exlap and washout--1U PRBCs and 1 plts; sent back to OR--3 U PRBCs, 1FFP, 1cryo; abd open and wound vac placed  11/17: CT chest, abd, pelvis, levo off 2 units PRBCs, 2 units platelets, 1 unit FFP   11/18: OR for closure, extubated   11/19: clear liquid diet   11/20: TPN/Lipids d/c'd, renal diet, transfer orders  11/21: Head and chest CT  11/22: Pt to IR for drain placement  11/23: HD trial  11/25: HD 2 Liters removed  Nursing:   -160          Outcome: Ongoing (interventions implemented as appropriate)  Patient aaox4. Bed kept locked, lowest position with 2x side rails up while in bed. nonslip footwear when out of bed. Ambulates with 2x assistance. Ambulation encouraged.  Family at bedside attentive to patient's needs. Call bell and personal items within reach. Renal diet. accuchecks ac/hs, insulin given as ordered. Eating encouraged.  Chevron incision raji with staples painted with betadine, complaints of abdominal tenderness, team aware. Left arm piv cdi. Started on IV antibiotics. Urine culture and analysis sent. Complaints of paint with moderate relief from prn medication. Self medications by caregiver. Contact precautions maintained.       "

## 2018-12-01 NOTE — PROCEDURES
Radiology Post-Procedure Note    Pre Op Diagnosis: Right pleural effusion  Post Op Diagnosis: Same    Procedure: Thoracentesis    Procedure performed by: Dmitri Lopez MD    Written Informed Consent Obtained: Yes  Specimen Removed: YES 1400mL serosanguinous fluid  Estimated Blood Loss: Minimal    Findings:   US guided right thoracentesis performed.  1400mL serosanguinous fluid removed.    Patient tolerated procedure well.    Dmitri Lopez MD  Diagnostic and Interventional Radiologist  Department of Radiology  Pager: [phone]

## 2018-12-01 NOTE — PROGRESS NOTES
Hemodialysis   3-hour HD completed, patient complained of back pain at the start of HD, relieved by giving Oxycodone 10 mg, blood returned completely, right subclavian permanent catheter flushed and locked with Normal Saline, capped and secured.    NET UF REMOVED: 1 liter

## 2018-12-01 NOTE — ASSESSMENT & PLAN NOTE
BG goal 140-180    Start Novolog 2 units with meals  Low dose correction scale given kidney function  BG monitoring AC/HS    Discharge plans- TBD

## 2018-12-01 NOTE — ASSESSMENT & PLAN NOTE
Managed per LTS.   avoid hypoglycemia  Optimize BG control for surgical wound healing.     Lab Results   Component Value Date    ALT 37 12/01/2018    AST 27 12/01/2018     (H) 11/26/2018    ALKPHOS 307 (H) 12/01/2018    BILITOT 3.3 (H) 12/01/2018

## 2018-12-01 NOTE — ASSESSMENT & PLAN NOTE
- DEL for over 2 weeks prior to transplant. Nephrology was consulted.     - Post transplant, Nephrology cont to follow.  He received HD last on 11/27 w/ 2L off.     - Attempted Lasix challenge (160 mg) x1 on 11/28- pt diuresed 105 cc.    - HD 11/30 for metabolic clearance and fluid management.  Cont strict I/O's.  Monitor closely.

## 2018-12-01 NOTE — SUBJECTIVE & OBJECTIVE
"Interval HPI:   Overnight events: Remains in TSU.  HD completed last night per nephro.  Prandial excursions noted throughout the day when patient is eating.  BG at goal this am (138 on labs).  Prednisone 15 mg daily, standard steroid taper.  On IV antibiotics.  Eatin%  Nausea: Yes  Hypoglycemia and intervention: No  Fever: No  TPN and/or TF: No    /80   Pulse 108   Temp 97.2 °F (36.2 °C) (Oral)   Resp 18   Ht 5' 10" (1.778 m)   Wt 83.1 kg (183 lb 3.2 oz)   SpO2 97%   BMI 26.29 kg/m²     Labs Reviewed and Include    Recent Labs   Lab 18  0722   *   CALCIUM 8.2*   ALBUMIN 1.6*   PROT 4.9*      K 4.3   CO2 24      BUN 33*   CREATININE 2.4*   ALKPHOS 307*   ALT 37   AST 27   BILITOT 3.3*     Lab Results   Component Value Date    WBC 17.77 (H) 2018    HGB 7.4 (L) 2018    HCT 23.8 (L) 2018    MCV 96 2018     2018     No results for input(s): TSH, FREET4 in the last 168 hours.  Lab Results   Component Value Date    HGBA1C 4.4 2018       Nutritional status:   Body mass index is 26.29 kg/m².  Lab Results   Component Value Date    ALBUMIN 1.6 (L) 2018    ALBUMIN 1.8 (L) 2018    ALBUMIN 1.6 (L) 2018     Lab Results   Component Value Date    PREALBUMIN 7 (L) 10/27/2018       Estimated Creatinine Clearance: 36.8 mL/min (A) (based on SCr of 2.4 mg/dL (H)).    Accu-Checks  Recent Labs     18  1159 18  1638 18  2114 18  0807 18  1645 18  2102 18  0751 18  1235 18  1810 18  2354   POCTGLUCOSE 136* 161* 223* 79 193* 284* 171* 226* 255* 183*       Current Medications and/or Treatments Impacting Glycemic Control  Immunotherapy:    Immunosuppressants         Stop Route Frequency     tacrolimus capsule 5 mg      -- Oral Daily     tacrolimus capsule 4 mg      -- Oral Daily        Steroids:   Hormones (From admission, onward)    Start     Stop Route Frequency Ordered    " 12/17/18 0900  predniSONE tablet 2.5 mg      12/24 0859 Oral Daily 11/26/18 1115    12/10/18 0900  predniSONE tablet 5 mg      12/17 0859 Oral Daily 11/26/18 1115    12/03/18 0900  predniSONE tablet 10 mg      12/10 0859 Oral Daily 11/26/18 1115    11/26/18 1145  predniSONE tablet 15 mg      12/03 0859 Oral Daily 11/26/18 1115    11/09/18 1345  methylPREDNISolone sodium succinate injection 500 mg  (Med - Immunosuppression Induction Therapy (Methylprednisolone))      11/10 0144 IV Once pre-op 11/09/18 1236        Pressors:    Autonomic Drugs (From admission, onward)    Start     Stop Route Frequency Ordered    11/30/18 1058  midodrine tablet 10 mg     Question:  Is the patient competent?  Answer:  Yes    -- Oral 3 times daily PRN 11/30/18 0958        Hyperglycemia/Diabetes Medications:   Antihyperglycemics (From admission, onward)    Start     Stop Route Frequency Ordered    11/27/18 1122  insulin aspart U-100 pen 0-5 Units      -- SubQ Before meals & nightly PRN 11/27/18 1023

## 2018-12-01 NOTE — PROGRESS NOTES
"[facility]  Endocrinology  Progress Note    Admit Date: 10/27/2018     Reason for Consult: Management of Hyperglycemia and type 2 DM    Surgical Procedure and Date: liver transplant 18; exploratory lap and liver biopsy on 18      Diabetes diagnosis year: ~ 3-4 years ago     Home Diabetes Medications:  none; previously on metformin 1000 mg BID    How often checking glucose at home? Not checking  BG readings on regimen: n/a  Hypoglycemia on the regimen?  n/a  Missed doses on regimen? n/a    Diabetes Complications include:     DORIAN    Complicating diabetes co morbidities:   CIRRHOSIS and Glucocorticoid use       HPI:   Patient is a 53 y.o. male with a diagnosis of ESLD secondary to ETOH abuse and DEL with ESRD with RRT. Patient is now s/p liver transplant. Endocrinology consulted for BG management.       Lab Results   Component Value Date    HGBA1C 4.4 2018             Interval HPI:   Overnight events: Remains in TSU.  HD completed last night per nephro.  Prandial excursions noted throughout the day when patient is eating.  BG at goal this am (138 on labs).  Prednisone 15 mg daily, standard steroid taper.  On IV antibiotics.  Eatin%  Nausea: Yes  Hypoglycemia and intervention: No  Fever: No  TPN and/or TF: No    /80   Pulse 108   Temp 97.2 °F (36.2 °C) (Oral)   Resp 18   Ht 5' 10" (1.778 m)   Wt 83.1 kg (183 lb 3.2 oz)   SpO2 97%   BMI 26.29 kg/m²      Labs Reviewed and Include    Recent Labs   Lab 18  0722   *   CALCIUM 8.2*   ALBUMIN 1.6*   PROT 4.9*      K 4.3   CO2 24      BUN 33*   CREATININE 2.4*   ALKPHOS 307*   ALT 37   AST 27   BILITOT 3.3*     Lab Results   Component Value Date    WBC 17.77 (H) 2018    HGB 7.4 (L) 2018    HCT 23.8 (L) 2018    MCV 96 2018     2018     No results for input(s): TSH, FREET4 in the last 168 hours.  Lab Results   Component Value Date    HGBA1C 4.4 2018 "       Nutritional status:   Body mass index is 26.29 kg/m².  Lab Results   Component Value Date    ALBUMIN 1.6 (L) 12/01/2018    ALBUMIN 1.8 (L) 11/30/2018    ALBUMIN 1.6 (L) 11/29/2018     Lab Results   Component Value Date    PREALBUMIN 7 (L) 10/27/2018       Estimated Creatinine Clearance: 36.8 mL/min (A) (based on SCr of 2.4 mg/dL (H)).    Accu-Checks  Recent Labs     11/28/18  1159 11/28/18  1638 11/28/18  2114 11/29/18  0807 11/29/18  1645 11/29/18  2102 11/30/18  0751 11/30/18  1235 11/30/18  1810 11/30/18  2354   POCTGLUCOSE 136* 161* 223* 79 193* 284* 171* 226* 255* 183*       Current Medications and/or Treatments Impacting Glycemic Control  Immunotherapy:    Immunosuppressants         Stop Route Frequency     tacrolimus capsule 5 mg      -- Oral Daily     tacrolimus capsule 4 mg      -- Oral Daily        Steroids:   Hormones (From admission, onward)    Start     Stop Route Frequency Ordered    12/17/18 0900  predniSONE tablet 2.5 mg      12/24 0859 Oral Daily 11/26/18 1115    12/10/18 0900  predniSONE tablet 5 mg      12/17 0859 Oral Daily 11/26/18 1115    12/03/18 0900  predniSONE tablet 10 mg      12/10 0859 Oral Daily 11/26/18 1115    11/26/18 1145  predniSONE tablet 15 mg      12/03 0859 Oral Daily 11/26/18 1115    11/09/18 1345  methylPREDNISolone sodium succinate injection 500 mg  (Med - Immunosuppression Induction Therapy (Methylprednisolone))      11/10 0144 IV Once pre-op 11/09/18 1236        Pressors:    Autonomic Drugs (From admission, onward)    Start     Stop Route Frequency Ordered    11/30/18 1058  midodrine tablet 10 mg     Question:  Is the patient competent?  Answer:  Yes    -- Oral 3 times daily PRN 11/30/18 0958        Hyperglycemia/Diabetes Medications:   Antihyperglycemics (From admission, onward)    Start     Stop Route Frequency Ordered    11/27/18 1122  insulin aspart U-100 pen 0-5 Units      -- SubQ Before meals & nightly PRN 11/27/18 1023          ASSESSMENT and PLAN    * S/P  liver transplant    Managed per LTS.   avoid hypoglycemia  Optimize BG control for surgical wound healing.     Lab Results   Component Value Date    ALT 37 12/01/2018    AST 27 12/01/2018     (H) 11/26/2018    ALKPHOS 307 (H) 12/01/2018    BILITOT 3.3 (H) 12/01/2018            Type 2 diabetes mellitus without complication    BG goal 140-180    Start Novolog 2 units with meals  Low dose correction scale given kidney function  BG monitoring AC/HS    Discharge plans- TBD     Adrenal cortical steroids causing adverse effect in therapeutic use    On standard steroid taper per transplant team; may elevate BG readings         DEL (acute kidney injury)    Avoid insulin stacking and hypoglycemia.  CRRT per nephrology  Lab Results   Component Value Date    CREATININE 2.4 (H) 12/01/2018            Prophylactic immunotherapy    May increase insulin resistance.            Zoran Almeida NP  Endocrinology  [facility]

## 2018-12-01 NOTE — ASSESSMENT & PLAN NOTE
Avoid insulin stacking and hypoglycemia.  CRRT per nephrology  Lab Results   Component Value Date    CREATININE 2.4 (H) 12/01/2018

## 2018-12-02 LAB
ALBUMIN SERPL BCP-MCNC: 1.4 G/DL
ALP SERPL-CCNC: 242 U/L
ALT SERPL W/O P-5'-P-CCNC: 30 U/L
ANION GAP SERPL CALC-SCNC: 7 MMOL/L
ANISOCYTOSIS BLD QL SMEAR: SLIGHT
AST SERPL-CCNC: 21 U/L
BACTERIA UR CULT: NO GROWTH
BASO STIPL BLD QL SMEAR: ABNORMAL
BASOPHILS # BLD AUTO: 0.14 K/UL
BASOPHILS NFR BLD: 1.1 %
BILIRUB SERPL-MCNC: 2.8 MG/DL
BLD PROD TYP BPU: NORMAL
BLOOD UNIT EXPIRATION DATE: NORMAL
BLOOD UNIT TYPE CODE: 5100
BLOOD UNIT TYPE: NORMAL
BUN SERPL-MCNC: 42 MG/DL
BURR CELLS BLD QL SMEAR: ABNORMAL
CALCIUM SERPL-MCNC: 8 MG/DL
CHLORIDE SERPL-SCNC: 104 MMOL/L
CO2 SERPL-SCNC: 24 MMOL/L
CODING SYSTEM: NORMAL
CREAT SERPL-MCNC: 3 MG/DL
DIFFERENTIAL METHOD: ABNORMAL
DISPENSE STATUS: NORMAL
EOSINOPHIL # BLD AUTO: 0.5 K/UL
EOSINOPHIL NFR BLD: 3.5 %
ERYTHROCYTE [DISTWIDTH] IN BLOOD BY AUTOMATED COUNT: 20.2 %
EST. GFR  (AFRICAN AMERICAN): 26.2 ML/MIN/1.73 M^2
EST. GFR  (NON AFRICAN AMERICAN): 22.7 ML/MIN/1.73 M^2
GIANT PLATELETS BLD QL SMEAR: PRESENT
GLUCOSE SERPL-MCNC: 192 MG/DL
HCT VFR BLD AUTO: 20.7 %
HCT VFR BLD AUTO: 22.1 %
HCT VFR BLD AUTO: 25.3 %
HGB BLD-MCNC: 6.4 G/DL
HGB BLD-MCNC: 6.9 G/DL
HGB BLD-MCNC: 8.1 G/DL
IMM GRANULOCYTES # BLD AUTO: 0.15 K/UL
IMM GRANULOCYTES NFR BLD AUTO: 1.1 %
INR PPP: 1.1
LYMPHOCYTES # BLD AUTO: 0.6 K/UL
LYMPHOCYTES NFR BLD: 4.8 %
MAGNESIUM SERPL-MCNC: 1.9 MG/DL
MCH RBC QN AUTO: 30.9 PG
MCHC RBC AUTO-ENTMCNC: 30.9 G/DL
MCV RBC AUTO: 100 FL
MONOCYTES # BLD AUTO: 0.5 K/UL
MONOCYTES NFR BLD: 3.6 %
NEUTROPHILS # BLD AUTO: 11.4 K/UL
NEUTROPHILS NFR BLD: 85.9 %
NRBC BLD-RTO: 0 /100 WBC
OVALOCYTES BLD QL SMEAR: ABNORMAL
PHOSPHATE SERPL-MCNC: 4.1 MG/DL
PLATELET # BLD AUTO: 248 K/UL
PLATELET BLD QL SMEAR: ABNORMAL
PMV BLD AUTO: 12.9 FL
POCT GLUCOSE: 171 MG/DL (ref 70–110)
POCT GLUCOSE: 197 MG/DL (ref 70–110)
POCT GLUCOSE: 207 MG/DL (ref 70–110)
POCT GLUCOSE: 214 MG/DL (ref 70–110)
POIKILOCYTOSIS BLD QL SMEAR: SLIGHT
POLYCHROMASIA BLD QL SMEAR: ABNORMAL
POTASSIUM SERPL-SCNC: 4.4 MMOL/L
PROT SERPL-MCNC: 4.5 G/DL
PROTHROMBIN TIME: 11.3 SEC
RBC # BLD AUTO: 2.07 M/UL
SODIUM SERPL-SCNC: 135 MMOL/L
TACROLIMUS BLD-MCNC: 9.4 NG/ML
TRANS ERYTHROCYTES VOL PATIENT: NORMAL ML
WBC # BLD AUTO: 13.25 K/UL

## 2018-12-02 PROCEDURE — 80053 COMPREHEN METABOLIC PANEL: CPT

## 2018-12-02 PROCEDURE — 25000003 PHARM REV CODE 250: Performed by: NURSE PRACTITIONER

## 2018-12-02 PROCEDURE — 99231 SBSQ HOSP IP/OBS SF/LOW 25: CPT | Mod: ,,, | Performed by: NURSE PRACTITIONER

## 2018-12-02 PROCEDURE — 94640 AIRWAY INHALATION TREATMENT: CPT

## 2018-12-02 PROCEDURE — 84100 ASSAY OF PHOSPHORUS: CPT

## 2018-12-02 PROCEDURE — 63600175 PHARM REV CODE 636 W HCPCS: Performed by: NURSE PRACTITIONER

## 2018-12-02 PROCEDURE — 85018 HEMOGLOBIN: CPT

## 2018-12-02 PROCEDURE — 20600001 HC STEP DOWN PRIVATE ROOM

## 2018-12-02 PROCEDURE — P9021 RED BLOOD CELLS UNIT: HCPCS

## 2018-12-02 PROCEDURE — 63600175 PHARM REV CODE 636 W HCPCS: Performed by: STUDENT IN AN ORGANIZED HEALTH CARE EDUCATION/TRAINING PROGRAM

## 2018-12-02 PROCEDURE — 36415 COLL VENOUS BLD VENIPUNCTURE: CPT

## 2018-12-02 PROCEDURE — 25000003 PHARM REV CODE 250: Performed by: STUDENT IN AN ORGANIZED HEALTH CARE EDUCATION/TRAINING PROGRAM

## 2018-12-02 PROCEDURE — 85014 HEMATOCRIT: CPT | Mod: 91

## 2018-12-02 PROCEDURE — 25000242 PHARM REV CODE 250 ALT 637 W/ HCPCS: Performed by: NURSE PRACTITIONER

## 2018-12-02 PROCEDURE — 85610 PROTHROMBIN TIME: CPT

## 2018-12-02 PROCEDURE — 99233 PR SUBSEQUENT HOSPITAL CARE,LEVL III: ICD-10-PCS | Mod: 24,,, | Performed by: NURSE PRACTITIONER

## 2018-12-02 PROCEDURE — 80197 ASSAY OF TACROLIMUS: CPT

## 2018-12-02 PROCEDURE — 85025 COMPLETE CBC W/AUTO DIFF WBC: CPT

## 2018-12-02 PROCEDURE — 85014 HEMATOCRIT: CPT

## 2018-12-02 PROCEDURE — 27000646 HC AEROBIKA DEVICE

## 2018-12-02 PROCEDURE — 85018 HEMOGLOBIN: CPT | Mod: 91

## 2018-12-02 PROCEDURE — 94761 N-INVAS EAR/PLS OXIMETRY MLT: CPT

## 2018-12-02 PROCEDURE — 99231 PR SUBSEQUENT HOSPITAL CARE,LEVL I: ICD-10-PCS | Mod: ,,, | Performed by: NURSE PRACTITIONER

## 2018-12-02 PROCEDURE — 94664 DEMO&/EVAL PT USE INHALER: CPT

## 2018-12-02 PROCEDURE — 99233 SBSQ HOSP IP/OBS HIGH 50: CPT | Mod: 24,,, | Performed by: NURSE PRACTITIONER

## 2018-12-02 PROCEDURE — 99900035 HC TECH TIME PER 15 MIN (STAT)

## 2018-12-02 PROCEDURE — 83735 ASSAY OF MAGNESIUM: CPT

## 2018-12-02 RX ORDER — SULFAMETHOXAZOLE AND TRIMETHOPRIM 400; 80 MG/1; MG/1
1 TABLET ORAL
Status: DISCONTINUED | OUTPATIENT
Start: 2018-12-03 | End: 2018-12-31

## 2018-12-02 RX ORDER — HYDROCODONE BITARTRATE AND ACETAMINOPHEN 500; 5 MG/1; MG/1
TABLET ORAL
Status: DISCONTINUED | OUTPATIENT
Start: 2018-12-02 | End: 2018-12-03

## 2018-12-02 RX ADMIN — PREDNISONE 15 MG: 5 TABLET ORAL at 09:12

## 2018-12-02 RX ADMIN — URSODIOL 300 MG: 300 CAPSULE ORAL at 09:12

## 2018-12-02 RX ADMIN — INSULIN ASPART 2 UNITS: 100 INJECTION, SOLUTION INTRAVENOUS; SUBCUTANEOUS at 08:12

## 2018-12-02 RX ADMIN — CEFEPIME 1 G: 1 INJECTION, POWDER, FOR SOLUTION INTRAMUSCULAR; INTRAVENOUS at 02:12

## 2018-12-02 RX ADMIN — IPRATROPIUM BROMIDE AND ALBUTEROL SULFATE 3 ML: .5; 3 SOLUTION RESPIRATORY (INHALATION) at 07:12

## 2018-12-02 RX ADMIN — IPRATROPIUM BROMIDE AND ALBUTEROL SULFATE 3 ML: .5; 3 SOLUTION RESPIRATORY (INHALATION) at 01:12

## 2018-12-02 RX ADMIN — FAMOTIDINE 20 MG: 20 TABLET ORAL at 09:12

## 2018-12-02 RX ADMIN — HEPARIN SODIUM 5000 UNITS: 5000 INJECTION, SOLUTION INTRAVENOUS; SUBCUTANEOUS at 09:12

## 2018-12-02 RX ADMIN — TACROLIMUS 3 MG: 1 CAPSULE ORAL at 09:12

## 2018-12-02 RX ADMIN — INSULIN ASPART 2 UNITS: 100 INJECTION, SOLUTION INTRAVENOUS; SUBCUTANEOUS at 05:12

## 2018-12-02 RX ADMIN — LINEZOLID 600 MG: 600 TABLET, FILM COATED ORAL at 09:12

## 2018-12-02 RX ADMIN — HEPARIN SODIUM 5000 UNITS: 5000 INJECTION, SOLUTION INTRAVENOUS; SUBCUTANEOUS at 04:12

## 2018-12-02 RX ADMIN — INSULIN ASPART 2 UNITS: 100 INJECTION, SOLUTION INTRAVENOUS; SUBCUTANEOUS at 12:12

## 2018-12-02 RX ADMIN — SODIUM BICARBONATE 650 MG TABLET 650 MG: at 05:12

## 2018-12-02 RX ADMIN — SODIUM BICARBONATE 650 MG TABLET 650 MG: at 09:12

## 2018-12-02 RX ADMIN — TRAZODONE HYDROCHLORIDE 50 MG: 50 TABLET ORAL at 09:12

## 2018-12-02 RX ADMIN — IPRATROPIUM BROMIDE AND ALBUTEROL SULFATE 3 ML: .5; 3 SOLUTION RESPIRATORY (INHALATION) at 08:12

## 2018-12-02 RX ADMIN — HEPARIN SODIUM 5000 UNITS: 5000 INJECTION, SOLUTION INTRAVENOUS; SUBCUTANEOUS at 02:12

## 2018-12-02 RX ADMIN — IPRATROPIUM BROMIDE AND ALBUTEROL SULFATE 3 ML: .5; 3 SOLUTION RESPIRATORY (INHALATION) at 05:12

## 2018-12-02 RX ADMIN — OXYCODONE HYDROCHLORIDE 10 MG: 10 TABLET ORAL at 12:12

## 2018-12-02 RX ADMIN — TACROLIMUS 3 MG: 1 CAPSULE ORAL at 05:12

## 2018-12-02 RX ADMIN — ISAVUCONAZONIUM SULFATE 372 MG: 186 CAPSULE ORAL at 09:12

## 2018-12-02 RX ADMIN — TIZANIDINE 4 MG: 4 TABLET ORAL at 10:12

## 2018-12-02 NOTE — ASSESSMENT & PLAN NOTE
Avoid insulin stacking and hypoglycemia.  Nephrology following  Lab Results   Component Value Date    CREATININE 3.0 (H) 12/02/2018

## 2018-12-02 NOTE — ASSESSMENT & PLAN NOTE
- 53 year old male with history of ESLD 2/2 ETOH cirrhosis who is s/p liver transplant c/b take-back x 3 for bleeding most recently 11/18.  - LFTs now improving slowly.  T bili was 37.6 day of transplant.  - Pt remains with significant debility. Pt will need SNF placement when medically stable.   - Appetite slowly improving.  Tolerating supplements.  Encourage PO intake.   - Drain placed during take back. Drain placed to intra-abdominal abscess on 11/22.   - Fluid from collection noted with enterococcus VRE. Cont with antibxs per ID.  De escalated to PO on 11/29/18.    - Abdominal pain well controlled, and having BMs.    - HD per nephrology. Last HD on 11/30.   - Muscle relaxer started and will hold off on oxycodone for now.

## 2018-12-02 NOTE — PROGRESS NOTES
"[facility]  Endocrinology  Progress Note    Admit Date: 10/27/2018     Reason for Consult: Management of Hyperglycemia and type 2 DM    Surgical Procedure and Date: liver transplant 11/11/18; exploratory lap and liver biopsy on 11/16/18      Diabetes diagnosis year: ~ 3-4 years ago     Home Diabetes Medications:  none; previously on metformin 1000 mg BID    How often checking glucose at home? Not checking  BG readings on regimen: n/a  Hypoglycemia on the regimen?  n/a  Missed doses on regimen? n/a    Diabetes Complications include:     DORIAN    Complicating diabetes co morbidities:   CIRRHOSIS and Glucocorticoid use       HPI:   Patient is a 53 y.o. male with a diagnosis of ESLD secondary to ETOH abuse and DEL with ESRD with RRT. Patient is now s/p liver transplant. Endocrinology consulted for BG management.       Lab Results   Component Value Date    HGBA1C 4.4 11/09/2018             Interval HPI:   Overnight events: Remains in TSU, NAEON.  BG mildly elevated yesterday during the day, slightly above goal this am (192 on labs). Prednisone 15 mg daily, standard steroid taper.  On IV antibiotics.  Eating:   <25%  Nausea: No  Hypoglycemia and intervention: No  Fever: No  TPN and/or TF: No    /76   Pulse 106   Temp 98.1 °F (36.7 °C)   Resp (!) 24   Ht 5' 10" (1.778 m)   Wt 75.5 kg (166 lb 7.2 oz)   SpO2 97%   BMI 23.88 kg/m²      Labs Reviewed and Include    Recent Labs   Lab 12/02/18  0650   *   CALCIUM 8.0*   ALBUMIN 1.4*   PROT 4.5*   *   K 4.4   CO2 24      BUN 42*   CREATININE 3.0*   ALKPHOS 242*   ALT 30   AST 21   BILITOT 2.8*     Lab Results   Component Value Date    WBC 17.77 (H) 12/01/2018    HGB 7.4 (L) 12/01/2018    HCT 23.8 (L) 12/01/2018    MCV 96 12/01/2018     12/01/2018     No results for input(s): TSH, FREET4 in the last 168 hours.  Lab Results   Component Value Date    HGBA1C 4.4 11/09/2018       Nutritional status:   Body mass index is 23.88 " kg/m².  Lab Results   Component Value Date    ALBUMIN 1.4 (L) 12/02/2018    ALBUMIN 1.6 (L) 12/01/2018    ALBUMIN 1.8 (L) 11/30/2018     Lab Results   Component Value Date    PREALBUMIN 7 (L) 10/27/2018       Estimated Creatinine Clearance: 29.4 mL/min (A) (based on SCr of 3 mg/dL (H)).    Accu-Checks  Recent Labs     11/29/18  1645 11/29/18  2102 11/30/18  0751 11/30/18  1235 11/30/18  1810 11/30/18  2354 12/01/18  1152 12/01/18  1721 12/01/18  2149   POCTGLUCOSE 193* 284* 171* 226* 255* 183* 218* 226* 187*       Current Medications and/or Treatments Impacting Glycemic Control  Immunotherapy:    Immunosuppressants         Stop Route Frequency     tacrolimus capsule 3 mg      -- Oral 2 times daily        Steroids:   Hormones (From admission, onward)    Start     Stop Route Frequency Ordered    12/17/18 0900  predniSONE tablet 2.5 mg      12/24 0859 Oral Daily 11/26/18 1115    12/10/18 0900  predniSONE tablet 5 mg      12/17 0859 Oral Daily 11/26/18 1115    12/03/18 0900  predniSONE tablet 10 mg      12/10 0859 Oral Daily 11/26/18 1115    11/26/18 1145  predniSONE tablet 15 mg      12/03 0859 Oral Daily 11/26/18 1115    11/09/18 1345  methylPREDNISolone sodium succinate injection 500 mg  (Med - Immunosuppression Induction Therapy (Methylprednisolone))      11/10 0144 IV Once pre-op 11/09/18 1236        Pressors:    Autonomic Drugs (From admission, onward)    Start     Stop Route Frequency Ordered    11/30/18 1058  midodrine tablet 10 mg     Question:  Is the patient competent?  Answer:  Yes    -- Oral 3 times daily PRN 11/30/18 0958        Hyperglycemia/Diabetes Medications:   Antihyperglycemics (From admission, onward)    Start     Stop Route Frequency Ordered    12/01/18 1215  insulin aspart U-100 pen 2 Units      -- SubQ 3 times daily with meals 12/01/18 1102    11/27/18 1122  insulin aspart U-100 pen 0-5 Units      -- SubQ Before meals & nightly PRN 11/27/18 1023          ASSESSMENT and PLAN    * S/P liver  transplant    Managed per LTS.   avoid hypoglycemia  Optimize BG control for surgical wound healing.     Lab Results   Component Value Date    ALT 30 12/02/2018    AST 21 12/02/2018     (H) 11/26/2018    ALKPHOS 242 (H) 12/02/2018    BILITOT 2.8 (H) 12/02/2018            Type 2 diabetes mellitus without complication    BG goal 140-180    Novolog 2 units with meals  Low dose correction scale given kidney function  BG monitoring AC/HS    Discharge plans- TBD     Adrenal cortical steroids causing adverse effect in therapeutic use    On standard steroid taper per transplant team; may elevate BG readings         DEL (acute kidney injury)    Avoid insulin stacking and hypoglycemia.  Nephrology following  Lab Results   Component Value Date    CREATININE 3.0 (H) 12/02/2018            Prophylactic immunotherapy    May increase insulin resistance.            Zoran Almeida NP  Endocrinology  [facility]

## 2018-12-02 NOTE — ASSESSMENT & PLAN NOTE
- DEL past 2 weeks.  Pt on Midodrine.  Last HD 11/9/18- 0 fluid removed 2/2 hypotension.  - Nephrology following for HD>  Pt cont to have hypotenstion worse w standing.  Midodrine 10 mg PRN for hypotension during HD ordered.

## 2018-12-02 NOTE — PROGRESS NOTES
[facility]  Liver Transplant  Progress Note    Patient Name: Femi Enciso  MRN: [id number]  Admission Date: 10/27/2018  Hospital Length of Stay: 36 days  Code Status: Full Code  Primary Care Provider: Primary Doctor No  Post-Operative Day: 21    ORGAN:   LIVER  Disease Etiology: Alcoholic Cirrhosis  Donor Type:    - Brain Death  CDC High Risk:   No  Donor CMV Status:   Donor CMV Status: Positive  Donor HBcAB:   Negative  Donor HCV Status:   Negative  Donor HBV JAVIER: Negative  Donor HCV JAVIER: Negative  Whole or Partial: Whole Liver  Biliary Anastomosis: End to End  Arterial Anatomy: Standard  Subjective:     History of Present Illness:  Femi Enciso is a 53yr old male with PMH of acute ETOH hepatitis and DEL/ATN evolved to ESRD requiring HD (not candidate for KTX). He is now s/p liver transplant (SM induction, DBD, CMV D+/R-). Transplant surgery noted severe collateralization, adrenal gland firmly adhered to liver. Bare area of adrenal gland required several stitches and packing to obtain fair hemostasis (EBL 15L). OR take back  for bleeding from R adrenal bed in AM (significant clot in retroperitoneum, posterior to R hepatic lobe, tract posterior to the R kidney containing significant clot, small bleeding area of retroperitoneal fat) then returned to surgery same day for hemorrhaging closed with wound vac with plans to return to OR 24-48 hours for closure (retroperitoneal /retrorenal/retrocolic hematoma on the right ). Raw retroperitoneal bleeding. Suture ligatures placed, argon beam cautery, evarest placed in retroperitoneum behind cava and right kidney. Significant oozing from upper right adrenal gland, not amenable to ligation, that portion of the adrenal gland was resected with cautery). OR take back  for washout and incisional closure, no significant bleeding or hematoma found. OR cultures   from body fluid grew enterococcus faecium VRE. ID was consulted,  started on  daptomycin for VRE treatment and cefepime/flagyl to cover for IA ty in bile. Patient with significant leukocytosis with peak on 11/22 at 48K prompting drainage of R subphrenic fluid collection, perc drain placed, culture grew VRE. Stroke code called 11/21 for lethargy and unresponsive, CT head without evidence of acute ischemic changes and CTA chest negative, vascular neurology was consulted recommended MRI brain, but patient's AMS improved shortly after event. Nephrology consulted for ESRD requiring HD. Patient overall improved on antibiotic regimen, leukocytosis and AMS improved. He was transferred to [facility] on POD #15.     Hospital Course:  Interval History:  No acute events overnight.  Pt resting comfortably this am. Muscle relaxer started for muscle spasms on 12/1. Will hold off on oxycodone at this time in order to not over medicate. Thoracentesis performed 11/30 and fluid noted with 4k WBCs. resp status slightly better this am. Cont with trazodone. His appetite is slowly improving. Last perc drain removed 11/29.  Chevron w staples CDI, no drainage. LFTs are trending down. Cr level still elevated and requiring HD intermittently.ID is following and based off of CT A/P changed abx from Dapto, Cefepime, and Flagyl to Linezolid 600 mg PO q 12 hr (started 11/29)for 10 days (tentative stop date 12/10/18). Cefepime started for recent pleural effusion. PT/OT following. Patient also doing exercises in room w CG.  He remains significantly debilitated. Monitor.       Scheduled Meds:   sodium chloride 0.9%   Intravenous Once    albuterol-ipratropium  3 mL Nebulization Q4H WAKE    ceFEPime (MAXIPIME) IVPB  1 g Intravenous Q12H    ergocalciferol  50,000 Units Oral Q7 Days    famotidine  20 mg Oral QHS    heparin (porcine)  5,000 Units Subcutaneous Q8H    insulin aspart U-100  2 Units Subcutaneous TIDWM    isavuconazonium sulfate  372 mg Oral Daily    lidocaine HCL 10 mg/ml (1%)  1 mL Intradermal Once    linezolid   600 mg Oral Q12H    [START ON 12/3/2018] predniSONE  10 mg Oral Daily    Followed by    [START ON 12/10/2018] predniSONE  5 mg Oral Daily    Followed by    [START ON 12/17/2018] predniSONE  2.5 mg Oral Daily    sodium bicarbonate  650 mg Oral BID    [START ON 12/3/2018] sulfamethoxazole-trimethoprim 400-80mg  1 tablet Oral Every Mon, Wed, Fri    tacrolimus  3 mg Oral BID    traZODone  50 mg Oral QHS    ursodiol  300 mg Oral BID    valganciclovir 50 mg/ml  100 mg Oral Once per day on Mon Wed Fri     Continuous Infusions:  PRN Meds:sodium chloride, sodium chloride 0.9%, albuterol-ipratropium, dextrose 50%, dextrose 50%, glucagon (human recombinant), glucose, glucose, heparin (porcine), insulin aspart U-100, midodrine, ondansetron, tiZANidine, traZODone    Review of Systems   Constitutional: Positive for activity change, appetite change (improving) and fatigue. Negative for fever.   HENT: Negative.  Negative for facial swelling.    Eyes: Negative.    Respiratory: Positive for shortness of breath (w exertion). Negative for apnea, chest tightness and wheezing.    Cardiovascular: Negative.  Negative for chest pain, palpitations and leg swelling.   Gastrointestinal: Positive for abdominal distention and abdominal pain. Negative for constipation, diarrhea, nausea and vomiting.   Genitourinary: Negative.  Negative for decreased urine volume, difficulty urinating, dysuria, hematuria and urgency.   Musculoskeletal: Negative.  Negative for back pain, gait problem, neck pain and neck stiffness.   Skin: Positive for wound. Negative for color change and pallor.   Allergic/Immunologic: Positive for immunocompromised state.   Neurological: Positive for weakness. Negative for dizziness, seizures, light-headedness and headaches.   Psychiatric/Behavioral: Negative for behavioral problems, confusion, decreased concentration, hallucinations, sleep disturbance and suicidal ideas. The patient is not nervous/anxious.       Objective:     Vital Signs (Most Recent):  Temp: 97.9 °F (36.6 °C) (12/02/18 1215)  Pulse: 93 (12/02/18 1200)  Resp: 19 (12/02/18 1200)  BP: 100/74 (12/02/18 1200)  SpO2: 98 % (12/02/18 1200) Vital Signs (24h Range):  Temp:  [97.9 °F (36.6 °C)-98.1 °F (36.7 °C)] 97.9 °F (36.6 °C)  Pulse:  [] 93  Resp:  [18-28] 19  SpO2:  [94 %-100 %] 98 %  BP: (100-112)/(74-83) 100/74     Weight: 75.5 kg (166 lb 7.2 oz)  Body mass index is 23.88 kg/m².    Intake/Output - Last 3 Shifts       11/30 0700 - 12/01 0659 12/01 0700 - 12/02 0659 12/02 0700 - 12/03 0659    P.O. 540 1160 340    I.V. (mL/kg)       Other 500      Total Intake(mL/kg) 1040 (12.5) 1160 (15.4) 340 (4.5)    Urine (mL/kg/hr) 150 (0.1) 0 (0)     Emesis/NG output  0     Other 2900 0     Stool  0     Blood  0     Total Output 3050 0     Net -2010 +1160 +340                 Physical Exam   Constitutional: He is oriented to person, place, and time. He appears well-developed. No distress.   HENT:   Head: Normocephalic and atraumatic.   Eyes: Pupils are equal, round, and reactive to light. Scleral icterus is present.   Neck: Normal range of motion. Neck supple. No JVD present.   Cardiovascular: Normal rate, regular rhythm and normal heart sounds.   No murmur heard.  Pulmonary/Chest: Effort normal. No respiratory distress. He has decreased breath sounds in the right lower field and the left lower field. He has no wheezes. He exhibits no tenderness.   Abdominal: Soft. Bowel sounds are normal. He exhibits no distension. There is tenderness.   Chevron inc ECTOR w/ staples  RLQ JOSE A drain and percutaneous drain site w suture CDI.    Musculoskeletal: Normal range of motion. He exhibits edema. He exhibits no tenderness.   Neurological: He is alert and oriented to person, place, and time. He has normal reflexes.   Skin: Skin is warm and dry. He is not diaphoretic.   Psychiatric: He has a normal mood and affect. His behavior is normal. Judgment and thought content normal.    Nursing note and vitals reviewed.      Laboratory:  Immunosuppressants         Stop Route Frequency     tacrolimus capsule 3 mg      -- Oral 2 times daily        CBC:   Recent Labs   Lab 12/02/18  0650 12/02/18  1016   WBC 13.25*  --    RBC 2.07*  --    HGB 6.4* 6.9*   HCT 20.7* 22.1*     --    *  --    MCH 30.9  --    MCHC 30.9*  --      CMP:   Recent Labs   Lab 12/02/18  0650   *   CALCIUM 8.0*   ALBUMIN 1.4*   PROT 4.5*   *   K 4.4   CO2 24      BUN 42*   CREATININE 3.0*   ALKPHOS 242*   ALT 30   AST 21   BILITOT 2.8*     Coagulation:   Recent Labs   Lab 11/29/18  0703  12/02/18  0650   INR 1.2   < > 1.1   APTT 30.9  --   --     < > = values in this interval not displayed.     Tacrolimus Lvl   Date Value Ref Range Status   12/02/2018 9.4 5.0 - 15.0 ng/mL Final     Comment:     Testing performed by Liquid Chromatography-Tandem  Mass Spectrometry (LC-MS/MS).  This test was developed and its performance characteristics  determined by [facility], Department of Pathology  and Laboratory Medicine in a manner consistent with CLIA  requirements. It has not been cleared or approved by the [place]  Food and Drug Administration.  This test is used for clinical   purposes.  It should not be regarded as investigational or for  research.     12/01/2018 14.4 5.0 - 15.0 ng/mL Final     Comment:     Testing performed by Liquid Chromatography-Tandem  Mass Spectrometry (LC-MS/MS).  This test was developed and its performance characteristics  determined by [facility], Department of Pathology  and Laboratory Medicine in a manner consistent with CLIA  requirements. It has not been cleared or approved by the [place]  Food and Drug Administration.  This test is used for clinical   purposes.  It should not be regarded as investigational or for  research.     11/30/2018 13.8 5.0 - 15.0 ng/mL Final     Comment:     Testing performed by Liquid Chromatography-Tandem  Mass Spectrometry  (LC-MS/MS).  This test was developed and its performance characteristics  determined by [facility], Department of Pathology  and Laboratory Medicine in a manner consistent with CLIA  requirements. It has not been cleared or approved by the [place]  Food and Drug Administration.  This test is used for clinical   purposes.  It should not be regarded as investigational or for  research.     11/29/2018 9.3 5.0 - 15.0 ng/mL Final     Comment:     Testing performed by Liquid Chromatography-Tandem  Mass Spectrometry (LC-MS/MS).  This test was developed and its performance characteristics  determined by [facility], Department of Pathology  and Laboratory Medicine in a manner consistent with CLIA  requirements. It has not been cleared or approved by the [place]  Food and Drug Administration.  This test is used for clinical   purposes.  It should not be regarded as investigational or for  research.           Labs within the past 24 hours have been reviewed.    Diagnostic Results:  None    Assessment/Plan:     * S/P liver transplant    - 53 year old male with history of ESLD 2/2 ETOH cirrhosis who is s/p liver transplant c/b take-back x 3 for bleeding most recently 11/18.  - LFTs now improving slowly.  T bili was 37.6 day of transplant.  - Pt remains with significant debility. Pt will need SNF placement when medically stable.   - Appetite slowly improving.  Tolerating supplements.  Encourage PO intake.   - Drain placed during take back. Drain placed to intra-abdominal abscess on 11/22.   - Fluid from collection noted with enterococcus VRE. Cont with antibxs per ID.  De escalated to PO on 11/29/18.    - Abdominal pain well controlled, and having BMs.    - HD per nephrology. Last HD on 11/30.   - Muscle relaxer started and will hold off on oxycodone for now.        Leucocytosis    - See intra-abdominal abscess.         Intra-abdominal abscess    - Significant increase in WBC post-op.   - OR cultures from 11/18  noted with enterococcus VRE.   - Abdominal CT performed at that time with fluid collection and drain placed on 11/22 for drainage.   - Intra-abdominal abscess culture also with enterococcus VRE.   - ID consulted and pt placed on antibxs for treatment. Pt was on Cefepime, Daptomycin, and Fluconazole for treatment.    - Pt remains with RLQ drains (one JOSE A and one Percutaneous).  One JOSE A removed 11/28.  Perc drain in placed draining tan, clear drainage.  WBC slowly improving.   - Liver US on 11/26 reviewed.   - Abdominal CT performed 11/27 and reviewed. Fluid collection noted with improvement on CT.  ID following and reassured by findings.    - Dapto, Cefepime and Flagyl changed 11/30/18 to Linezolid 600 mg PO q 12 hr x 10 days per ID.        Weakness    - Pt remains significantly debilitated.   - PT/OT for strengthening.   - Currently will need SNF for placement and further rehabilitation.        Acute renal failure with acute tubular necrosis superimposed on stage 3 chronic kidney disease    - Renal function slow to recover post-op.   - Nephrology consulted for management.   - Cont with HD as per nephrology recs.  Apprec recs.    - HD on 11/27 w/ 2L off. Pt tolerated well.    - Lasix 160 mg challenge 11/28 w/ minimal output.    - HD performed on 11/30.   - Strict I&Os.      Anemia of chronic disease    - H&H low this am and will plan to transfuse one unit of PRBCs for replacement.   - Repeat H&H later this afternoon.      At risk for opportunistic infections    - Cont with bactrim and valcyte for protection against opportunistic infections.   - cont Isavuconazonium.     Delirium    - Pt with intermittent confusion since transplant but has now improved slowly.   - Pt with some lethargy and confusion on 11/21. Head CT negative.   - AAOx3. More alert and awake on 11/27.   - AAOx4 on 11/29.  He was happy he could remember what day it is today.    - confusion continues to improve.  Seroquel d/c'd.  Trazodone for insomnia  ordered w PRN dose.    - Monitor closely.        Long-term use of immunosuppressant medication    - Cont with prograf. Draw prograf level daily and adjust dose as needed to maintain a therapeutic level.        Prophylactic immunotherapy    - See long term immunosuppression.        Type 2 diabetes mellitus without complication    - Endocrine consulted for management. Appreciate recs.        Moderate malnutrition    - muscle wasting noted.  Appetite slowly improving.  Tolerating supplements.  Dietician following.  Apprec recs.         Pleural effusion associated with hepatic disorder    - c/o increased pain w deep breath today to right side.  CXR showed increased opacity in the inferior hemothorax on the right side since 11/26/2018.  Pulse ox 97-99% on RA.  Cont to monitor    - continues to have fluid to RLL.  WBC remains elevated.    - thoracentesis performed on 11/30. Fluid noted with 4k WBC. Cefepime started for treatment and will need to discuss PO options.        DEL (acute kidney injury)    - DEL for over 2 weeks prior to transplant. Nephrology was consulted.     - Post transplant, Nephrology cont to follow.  He received HD last on 11/27 w/ 2L off.     - Attempted Lasix challenge (160 mg) x1 on 11/28- pt diuresed 105 cc.    - HD 11/30 for metabolic clearance and fluid management.  Cont strict I/O's.  Monitor closely.     Hepatorenal syndrome    - DEL past 2 weeks.  Pt on Midodrine.  Last HD 11/9/18- 0 fluid removed 2/2 hypotension.  - Nephrology following for HD>  Pt cont to have hypotenstion worse w standing.  Midodrine 10 mg PRN for hypotension during HD ordered.           VTE Risk Mitigation (From admission, onward)        Ordered     heparin (porcine) injection 5,000 Units  Every 8 hours      11/30/18 1149     heparin (porcine) injection 1,000 Units  As needed (PRN)      11/25/18 1428     IP VTE HIGH RISK PATIENT  Once      11/11/18 1208          The patients clinical status was discussed at multidisplinary  rounds, involving transplant surgery, transplant medicine, pharmacy, nursing, nutrition, and social work    Discharge Planning:  No Patient Care Coordination Note on file.      Kevin Miranda NP  Liver Transplant  [facility]

## 2018-12-02 NOTE — SUBJECTIVE & OBJECTIVE
Scheduled Meds:   sodium chloride 0.9%   Intravenous Once    albuterol-ipratropium  3 mL Nebulization Q4H WAKE    ceFEPime (MAXIPIME) IVPB  1 g Intravenous Q12H    ergocalciferol  50,000 Units Oral Q7 Days    famotidine  20 mg Oral QHS    heparin (porcine)  5,000 Units Subcutaneous Q8H    insulin aspart U-100  2 Units Subcutaneous TIDWM    isavuconazonium sulfate  372 mg Oral Daily    lidocaine HCL 10 mg/ml (1%)  1 mL Intradermal Once    linezolid  600 mg Oral Q12H    [START ON 12/3/2018] predniSONE  10 mg Oral Daily    Followed by    [START ON 12/10/2018] predniSONE  5 mg Oral Daily    Followed by    [START ON 12/17/2018] predniSONE  2.5 mg Oral Daily    sodium bicarbonate  650 mg Oral BID    [START ON 12/3/2018] sulfamethoxazole-trimethoprim 400-80mg  1 tablet Oral Every Mon, Wed, Fri    tacrolimus  3 mg Oral BID    traZODone  50 mg Oral QHS    ursodiol  300 mg Oral BID    valganciclovir 50 mg/ml  100 mg Oral Once per day on Mon Wed Fri     Continuous Infusions:  PRN Meds:sodium chloride, sodium chloride 0.9%, albuterol-ipratropium, dextrose 50%, dextrose 50%, glucagon (human recombinant), glucose, glucose, heparin (porcine), insulin aspart U-100, midodrine, ondansetron, tiZANidine, traZODone    Review of Systems   Constitutional: Positive for activity change, appetite change (improving) and fatigue. Negative for fever.   HENT: Negative.  Negative for facial swelling.    Eyes: Negative.    Respiratory: Positive for shortness of breath (w exertion). Negative for apnea, chest tightness and wheezing.    Cardiovascular: Negative.  Negative for chest pain, palpitations and leg swelling.   Gastrointestinal: Positive for abdominal distention and abdominal pain. Negative for constipation, diarrhea, nausea and vomiting.   Genitourinary: Negative.  Negative for decreased urine volume, difficulty urinating, dysuria, hematuria and urgency.   Musculoskeletal: Negative.  Negative for back pain, gait problem,  neck pain and neck stiffness.   Skin: Positive for wound. Negative for color change and pallor.   Allergic/Immunologic: Positive for immunocompromised state.   Neurological: Positive for weakness. Negative for dizziness, seizures, light-headedness and headaches.   Psychiatric/Behavioral: Negative for behavioral problems, confusion, decreased concentration, hallucinations, sleep disturbance and suicidal ideas. The patient is not nervous/anxious.      Objective:     Vital Signs (Most Recent):  Temp: 97.9 °F (36.6 °C) (12/02/18 1215)  Pulse: 93 (12/02/18 1200)  Resp: 19 (12/02/18 1200)  BP: 100/74 (12/02/18 1200)  SpO2: 98 % (12/02/18 1200) Vital Signs (24h Range):  Temp:  [97.9 °F (36.6 °C)-98.1 °F (36.7 °C)] 97.9 °F (36.6 °C)  Pulse:  [] 93  Resp:  [18-28] 19  SpO2:  [94 %-100 %] 98 %  BP: (100-112)/(74-83) 100/74     Weight: 75.5 kg (166 lb 7.2 oz)  Body mass index is 23.88 kg/m².    Intake/Output - Last 3 Shifts       11/30 0700 - 12/01 0659 12/01 0700 - 12/02 0659 12/02 0700 - 12/03 0659    P.O. 540 1160 340    I.V. (mL/kg)       Other 500      Total Intake(mL/kg) 1040 (12.5) 1160 (15.4) 340 (4.5)    Urine (mL/kg/hr) 150 (0.1) 0 (0)     Emesis/NG output  0     Other 2900 0     Stool  0     Blood  0     Total Output 3050 0     Net -2010 +1160 +340                 Physical Exam   Constitutional: He is oriented to person, place, and time. He appears well-developed. No distress.   HENT:   Head: Normocephalic and atraumatic.   Eyes: Pupils are equal, round, and reactive to light. Scleral icterus is present.   Neck: Normal range of motion. Neck supple. No JVD present.   Cardiovascular: Normal rate, regular rhythm and normal heart sounds.   No murmur heard.  Pulmonary/Chest: Effort normal. No respiratory distress. He has decreased breath sounds in the right lower field and the left lower field. He has no wheezes. He exhibits no tenderness.   Abdominal: Soft. Bowel sounds are normal. He exhibits no distension. There  is tenderness.   Chevron inc ECTOR w/ staples  RLQ JOSE A drain and percutaneous drain site w suture CDI.    Musculoskeletal: Normal range of motion. He exhibits edema. He exhibits no tenderness.   Neurological: He is alert and oriented to person, place, and time. He has normal reflexes.   Skin: Skin is warm and dry. He is not diaphoretic.   Psychiatric: He has a normal mood and affect. His behavior is normal. Judgment and thought content normal.   Nursing note and vitals reviewed.      Laboratory:  Immunosuppressants         Stop Route Frequency     tacrolimus capsule 3 mg      -- Oral 2 times daily        CBC:   Recent Labs   Lab 12/02/18  0650 12/02/18  1016   WBC 13.25*  --    RBC 2.07*  --    HGB 6.4* 6.9*   HCT 20.7* 22.1*     --    *  --    MCH 30.9  --    MCHC 30.9*  --      CMP:   Recent Labs   Lab 12/02/18  0650   *   CALCIUM 8.0*   ALBUMIN 1.4*   PROT 4.5*   *   K 4.4   CO2 24      BUN 42*   CREATININE 3.0*   ALKPHOS 242*   ALT 30   AST 21   BILITOT 2.8*     Coagulation:   Recent Labs   Lab 11/29/18  0703  12/02/18  0650   INR 1.2   < > 1.1   APTT 30.9  --   --     < > = values in this interval not displayed.     Tacrolimus Lvl   Date Value Ref Range Status   12/02/2018 9.4 5.0 - 15.0 ng/mL Final     Comment:     Testing performed by Liquid Chromatography-Tandem  Mass Spectrometry (LC-MS/MS).  This test was developed and its performance characteristics  determined by [facility], Department of Pathology  and Laboratory Medicine in a manner consistent with CLIA  requirements. It has not been cleared or approved by the [place]  Food and Drug Administration.  This test is used for clinical   purposes.  It should not be regarded as investigational or for  research.     12/01/2018 14.4 5.0 - 15.0 ng/mL Final     Comment:     Testing performed by Liquid Chromatography-Tandem  Mass Spectrometry (LC-MS/MS).  This test was developed and its performance characteristics  determined  by [facility], Department of Pathology  and Laboratory Medicine in a manner consistent with CLIA  requirements. It has not been cleared or approved by the [place]  Food and Drug Administration.  This test is used for clinical   purposes.  It should not be regarded as investigational or for  research.     11/30/2018 13.8 5.0 - 15.0 ng/mL Final     Comment:     Testing performed by Liquid Chromatography-Tandem  Mass Spectrometry (LC-MS/MS).  This test was developed and its performance characteristics  determined by [facility], Department of Pathology  and Laboratory Medicine in a manner consistent with CLIA  requirements. It has not been cleared or approved by the [place]  Food and Drug Administration.  This test is used for clinical   purposes.  It should not be regarded as investigational or for  research.     11/29/2018 9.3 5.0 - 15.0 ng/mL Final     Comment:     Testing performed by Liquid Chromatography-Tandem  Mass Spectrometry (LC-MS/MS).  This test was developed and its performance characteristics  determined by [facility], Department of Pathology  and Laboratory Medicine in a manner consistent with CLIA  requirements. It has not been cleared or approved by the [place]  Food and Drug Administration.  This test is used for clinical   purposes.  It should not be regarded as investigational or for  research.           Labs within the past 24 hours have been reviewed.    Diagnostic Results:  None

## 2018-12-02 NOTE — ASSESSMENT & PLAN NOTE
Managed per LTS.   avoid hypoglycemia  Optimize BG control for surgical wound healing.     Lab Results   Component Value Date    ALT 30 12/02/2018    AST 21 12/02/2018     (H) 11/26/2018    ALKPHOS 242 (H) 12/02/2018    BILITOT 2.8 (H) 12/02/2018

## 2018-12-02 NOTE — ASSESSMENT & PLAN NOTE
- c/o increased pain w deep breath today to right side.  CXR showed increased opacity in the inferior hemothorax on the right side since 11/26/2018.  Pulse ox 97-99% on RA.  Cont to monitor    - continues to have fluid to RLL.  WBC remains elevated.    - thoracentesis performed on 11/30. Fluid noted with 4k WBC. Cefepime started for treatment and will need to discuss PO options.

## 2018-12-02 NOTE — ASSESSMENT & PLAN NOTE
- H&H low this am and will plan to transfuse one unit of PRBCs for replacement.   - Repeat H&H later this afternoon.

## 2018-12-02 NOTE — SUBJECTIVE & OBJECTIVE
"Interval HPI:   Overnight events: Remains in TSU, NAEON.  BG mildly elevated yesterday during the day, slightly above goal this am (192 on labs). Prednisone 15 mg daily, standard steroid taper.  On IV antibiotics.  Eating:   <25%  Nausea: No  Hypoglycemia and intervention: No  Fever: No  TPN and/or TF: No    /76   Pulse 106   Temp 98.1 °F (36.7 °C)   Resp (!) 24   Ht 5' 10" (1.778 m)   Wt 75.5 kg (166 lb 7.2 oz)   SpO2 97%   BMI 23.88 kg/m²     Labs Reviewed and Include    Recent Labs   Lab 12/02/18  0650   *   CALCIUM 8.0*   ALBUMIN 1.4*   PROT 4.5*   *   K 4.4   CO2 24      BUN 42*   CREATININE 3.0*   ALKPHOS 242*   ALT 30   AST 21   BILITOT 2.8*     Lab Results   Component Value Date    WBC 17.77 (H) 12/01/2018    HGB 7.4 (L) 12/01/2018    HCT 23.8 (L) 12/01/2018    MCV 96 12/01/2018     12/01/2018     No results for input(s): TSH, FREET4 in the last 168 hours.  Lab Results   Component Value Date    HGBA1C 4.4 11/09/2018       Nutritional status:   Body mass index is 23.88 kg/m².  Lab Results   Component Value Date    ALBUMIN 1.4 (L) 12/02/2018    ALBUMIN 1.6 (L) 12/01/2018    ALBUMIN 1.8 (L) 11/30/2018     Lab Results   Component Value Date    PREALBUMIN 7 (L) 10/27/2018       Estimated Creatinine Clearance: 29.4 mL/min (A) (based on SCr of 3 mg/dL (H)).    Accu-Checks  Recent Labs     11/29/18  1645 11/29/18  2102 11/30/18  0751 11/30/18  1235 11/30/18  1810 11/30/18  2354 12/01/18  1152 12/01/18  1721 12/01/18  2149   POCTGLUCOSE 193* 284* 171* 226* 255* 183* 218* 226* 187*       Current Medications and/or Treatments Impacting Glycemic Control  Immunotherapy:    Immunosuppressants         Stop Route Frequency     tacrolimus capsule 3 mg      -- Oral 2 times daily        Steroids:   Hormones (From admission, onward)    Start     Stop Route Frequency Ordered    12/17/18 0900  predniSONE tablet 2.5 mg      12/24 0859 Oral Daily 11/26/18 1115    12/10/18 0900  predniSONE " tablet 5 mg      12/17 0859 Oral Daily 11/26/18 1115    12/03/18 0900  predniSONE tablet 10 mg      12/10 0859 Oral Daily 11/26/18 1115    11/26/18 1145  predniSONE tablet 15 mg      12/03 0859 Oral Daily 11/26/18 1115    11/09/18 1345  methylPREDNISolone sodium succinate injection 500 mg  (Med - Immunosuppression Induction Therapy (Methylprednisolone))      11/10 0144 IV Once pre-op 11/09/18 1236        Pressors:    Autonomic Drugs (From admission, onward)    Start     Stop Route Frequency Ordered    11/30/18 1058  midodrine tablet 10 mg     Question:  Is the patient competent?  Answer:  Yes    -- Oral 3 times daily PRN 11/30/18 0958        Hyperglycemia/Diabetes Medications:   Antihyperglycemics (From admission, onward)    Start     Stop Route Frequency Ordered    12/01/18 1215  insulin aspart U-100 pen 2 Units      -- SubQ 3 times daily with meals 12/01/18 1102    11/27/18 1122  insulin aspart U-100 pen 0-5 Units      -- SubQ Before meals & nightly PRN 11/27/18 1023

## 2018-12-02 NOTE — PLAN OF CARE
"Problem: Patient Care Overview  Goal: Plan of Care Review  Dx: Liver transplant (11/11)  hx: ESLD, ETOH abuse; Severe depression.    11/12: liver transplant; extubated. Products given in OR and HD in OR. CRRT nocturnal.  11/13: CRRT nocturnal held; lines removed. FFP x1 given. Pulled out 2 JOSE A drains.   11/14: 3 PRBC's given for hgb 6; liver US shows potential hematoma. Trend CBC. Extreme agitation/attempted to "kill self" by holding breath for as long as possible multiple times. IV ativan/haldol given. Nocturnal CRRT restarted  Potential washout  To be determined.    11/16: OR for exlap and washout--1U PRBCs and 1 plts; sent back to OR--3 U PRBCs, 1FFP, 1cryo; abd open and wound vac placed  11/17: CT chest, abd, pelvis, levo off 2 units PRBCs, 2 units platelets, 1 unit FFP   11/18: OR for closure, extubated   11/19: clear liquid diet   11/20: TPN/Lipids d/c'd, renal diet, transfer orders  11/21: Head and chest CT  11/22: Pt to IR for drain placement  11/23: HD trial  11/25: HD 2 Liters removed  Nursing:   -160          Outcome: Ongoing (interventions implemented as appropriate)  -Pt AAOx4  -Vital signs stable  -BG monitoring ACHS; BG HS was 187, *no SSI coverage needed  -Chevron incision CDI; ECTOR with staples  -PRN Tizanidine and oxycodone given x1 so far thus shift  -Fall precautions maintained, non skid socks worn  -No acute events/falls/injuries this shift  -Mother at bedside  -Pt aware to call for help with ambulating.    -Bed lowered, locked, siderails up x2, and call bell in reach.    See flowsheet for assessment findings.  Will continue to monitor pt.       "

## 2018-12-02 NOTE — PLAN OF CARE
"Problem: Patient Care Overview  Goal: Plan of Care Review  Dx: Liver transplant (11/11)  hx: ESLD, ETOH abuse; Severe depression.    11/12: liver transplant; extubated. Products given in OR and HD in OR. CRRT nocturnal.  11/13: CRRT nocturnal held; lines removed. FFP x1 given. Pulled out 2 JOSE A drains.   11/14: 3 PRBC's given for hgb 6; liver US shows potential hematoma. Trend CBC. Extreme agitation/attempted to "kill self" by holding breath for as long as possible multiple times. IV ativan/haldol given. Nocturnal CRRT restarted  Potential washout  To be determined.    11/16: OR for exlap and washout--1U PRBCs and 1 plts; sent back to OR--3 U PRBCs, 1FFP, 1cryo; abd open and wound vac placed  11/17: CT chest, abd, pelvis, levo off 2 units PRBCs, 2 units platelets, 1 unit FFP   11/18: OR for closure, extubated   11/19: clear liquid diet   11/20: TPN/Lipids d/c'd, renal diet, transfer orders  11/21: Head and chest CT  11/22: Pt to IR for drain placement  11/23: HD trial  11/25: HD 2 Liters removed  Nursing:   -160          Outcome: Ongoing (interventions implemented as appropriate)  Patient aaox4. Bed kept locked, lowest position with 2x side rails up while in bed. nonslip footwear when out of bed. Ambulates with 2x assistance. Ambulation encouraged.  Family at bedside attentive to patient's needs. Call bell and personal items within reach. Renal diet. accuchecks ac/hs, insulin given as ordered. Eating encouraged.  Chevron incision raji with staples painted with betadine, complaints of abdominal tenderness, team aware. Left arm piv cdi. Started on IV antibiotics.  Self medications by caregiver.  H/H 6.9/22.1, given I unit PRBCs, will recheck after completion. Contact precautions maintained.       "

## 2018-12-02 NOTE — ASSESSMENT & PLAN NOTE
BG goal 140-180    Novolog 2 units with meals  Low dose correction scale given kidney function  BG monitoring AC/HS    Discharge plans- TBD

## 2018-12-03 LAB
ABO + RH BLD: NORMAL
ALBUMIN SERPL BCP-MCNC: 1.4 G/DL
ALLENS TEST: ABNORMAL
ALP SERPL-CCNC: 261 U/L
ALT SERPL W/O P-5'-P-CCNC: 26 U/L
ANION GAP SERPL CALC-SCNC: 9 MMOL/L
AST SERPL-CCNC: 17 U/L
BACTERIA UR CULT: NORMAL
BASOPHILS # BLD AUTO: 0.11 K/UL
BASOPHILS NFR BLD: 0.9 %
BILIRUB SERPL-MCNC: 2.6 MG/DL
BLD GP AB SCN CELLS X3 SERPL QL: NORMAL
BLD PROD TYP BPU: NORMAL
BLOOD UNIT EXPIRATION DATE: NORMAL
BLOOD UNIT TYPE CODE: 5100
BLOOD UNIT TYPE: NORMAL
BUN SERPL-MCNC: 53 MG/DL
CALCIUM SERPL-MCNC: 8 MG/DL
CHLORIDE SERPL-SCNC: 105 MMOL/L
CO2 SERPL-SCNC: 22 MMOL/L
CODING SYSTEM: NORMAL
CREAT SERPL-MCNC: 3.5 MG/DL
DELSYS: ABNORMAL
DIFFERENTIAL METHOD: ABNORMAL
DISPENSE STATUS: NORMAL
EOSINOPHIL # BLD AUTO: 0.3 K/UL
EOSINOPHIL NFR BLD: 2.4 %
ERYTHROCYTE [DISTWIDTH] IN BLOOD BY AUTOMATED COUNT: 19.3 %
ERYTHROCYTE [SEDIMENTATION RATE] IN BLOOD BY WESTERGREN METHOD: 18 MM/H
EST. GFR  (AFRICAN AMERICAN): 21.7 ML/MIN/1.73 M^2
EST. GFR  (NON AFRICAN AMERICAN): 18.8 ML/MIN/1.73 M^2
FIO2: 21
GLUCOSE SERPL-MCNC: 223 MG/DL
HAPTOGLOB SERPL-MCNC: 229 MG/DL
HAV IGM SERPL QL IA: NEGATIVE
HBV CORE IGM SERPL QL IA: NEGATIVE
HBV SURFACE AG SERPL QL IA: NEGATIVE
HCO3 UR-SCNC: 21 MMOL/L (ref 24–28)
HCT VFR BLD AUTO: 22.2 %
HCV AB SERPL QL IA: NEGATIVE
HGB BLD-MCNC: 7.1 G/DL
IMM GRANULOCYTES # BLD AUTO: 0.16 K/UL
IMM GRANULOCYTES NFR BLD AUTO: 1.4 %
INR PPP: 1.1
LDH SERPL L TO P-CCNC: 246 U/L
LYMPHOCYTES # BLD AUTO: 0.5 K/UL
LYMPHOCYTES NFR BLD: 3.9 %
MAGNESIUM SERPL-MCNC: 1.8 MG/DL
MCH RBC QN AUTO: 30.2 PG
MCHC RBC AUTO-ENTMCNC: 32 G/DL
MCV RBC AUTO: 95 FL
MODE: ABNORMAL
MONOCYTES # BLD AUTO: 0.4 K/UL
MONOCYTES NFR BLD: 3.7 %
NEUTROPHILS # BLD AUTO: 10.2 K/UL
NEUTROPHILS NFR BLD: 87.7 %
NRBC BLD-RTO: 0 /100 WBC
PCO2 BLDA: 35.2 MMHG (ref 35–45)
PH SMN: 7.38 [PH] (ref 7.35–7.45)
PHOSPHATE SERPL-MCNC: 4.1 MG/DL
PLATELET # BLD AUTO: 241 K/UL
PMV BLD AUTO: 12.5 FL
PO2 BLDA: 89 MMHG (ref 80–100)
POC BE: -4 MMOL/L
POC SATURATED O2: 97 % (ref 95–100)
POC TCO2: 22 MMOL/L (ref 23–27)
POCT GLUCOSE: 157 MG/DL (ref 70–110)
POCT GLUCOSE: 182 MG/DL (ref 70–110)
POCT GLUCOSE: 224 MG/DL (ref 70–110)
POCT GLUCOSE: 240 MG/DL (ref 70–110)
POCT GLUCOSE: 248 MG/DL (ref 70–110)
POTASSIUM SERPL-SCNC: 4.7 MMOL/L
PROT SERPL-MCNC: 4.7 G/DL
PROTHROMBIN TIME: 11.3 SEC
RBC # BLD AUTO: 2.35 M/UL
SAMPLE: ABNORMAL
SITE: ABNORMAL
SODIUM SERPL-SCNC: 136 MMOL/L
SP02: 99
TACROLIMUS BLD-MCNC: 12.3 NG/ML
TRANS ERYTHROCYTES VOL PATIENT: NORMAL ML
WBC # BLD AUTO: 11.64 K/UL

## 2018-12-03 PROCEDURE — 83010 ASSAY OF HAPTOGLOBIN QUANT: CPT

## 2018-12-03 PROCEDURE — 36415 COLL VENOUS BLD VENIPUNCTURE: CPT

## 2018-12-03 PROCEDURE — 94799 UNLISTED PULMONARY SVC/PX: CPT

## 2018-12-03 PROCEDURE — 63600175 PHARM REV CODE 636 W HCPCS: Performed by: STUDENT IN AN ORGANIZED HEALTH CARE EDUCATION/TRAINING PROGRAM

## 2018-12-03 PROCEDURE — 25000003 PHARM REV CODE 250: Performed by: STUDENT IN AN ORGANIZED HEALTH CARE EDUCATION/TRAINING PROGRAM

## 2018-12-03 PROCEDURE — 99231 PR SUBSEQUENT HOSPITAL CARE,LEVL I: ICD-10-PCS | Mod: ,,, | Performed by: NURSE PRACTITIONER

## 2018-12-03 PROCEDURE — 86901 BLOOD TYPING SEROLOGIC RH(D): CPT

## 2018-12-03 PROCEDURE — 83735 ASSAY OF MAGNESIUM: CPT

## 2018-12-03 PROCEDURE — 25000003 PHARM REV CODE 250: Performed by: NURSE PRACTITIONER

## 2018-12-03 PROCEDURE — 94640 AIRWAY INHALATION TREATMENT: CPT

## 2018-12-03 PROCEDURE — 99900035 HC TECH TIME PER 15 MIN (STAT)

## 2018-12-03 PROCEDURE — 63600175 PHARM REV CODE 636 W HCPCS: Performed by: NURSE PRACTITIONER

## 2018-12-03 PROCEDURE — P9047 ALBUMIN (HUMAN), 25%, 50ML: HCPCS | Mod: JG | Performed by: NURSE PRACTITIONER

## 2018-12-03 PROCEDURE — 84100 ASSAY OF PHOSPHORUS: CPT

## 2018-12-03 PROCEDURE — 99233 PR SUBSEQUENT HOSPITAL CARE,LEVL III: ICD-10-PCS | Mod: ,,, | Performed by: INTERNAL MEDICINE

## 2018-12-03 PROCEDURE — 97535 SELF CARE MNGMENT TRAINING: CPT

## 2018-12-03 PROCEDURE — 80053 COMPREHEN METABOLIC PANEL: CPT

## 2018-12-03 PROCEDURE — 85025 COMPLETE CBC W/AUTO DIFF WBC: CPT

## 2018-12-03 PROCEDURE — 99233 SBSQ HOSP IP/OBS HIGH 50: CPT | Mod: 24,,, | Performed by: NURSE PRACTITIONER

## 2018-12-03 PROCEDURE — 99233 SBSQ HOSP IP/OBS HIGH 50: CPT | Mod: ,,, | Performed by: INTERNAL MEDICINE

## 2018-12-03 PROCEDURE — 86920 COMPATIBILITY TEST SPIN: CPT

## 2018-12-03 PROCEDURE — 99231 SBSQ HOSP IP/OBS SF/LOW 25: CPT | Mod: ,,, | Performed by: NURSE PRACTITIONER

## 2018-12-03 PROCEDURE — 85610 PROTHROMBIN TIME: CPT

## 2018-12-03 PROCEDURE — P9021 RED BLOOD CELLS UNIT: HCPCS

## 2018-12-03 PROCEDURE — 80197 ASSAY OF TACROLIMUS: CPT

## 2018-12-03 PROCEDURE — 20600001 HC STEP DOWN PRIVATE ROOM

## 2018-12-03 PROCEDURE — 25000242 PHARM REV CODE 250 ALT 637 W/ HCPCS: Performed by: NURSE PRACTITIONER

## 2018-12-03 PROCEDURE — 36569 INSJ PICC 5 YR+ W/O IMAGING: CPT

## 2018-12-03 PROCEDURE — C1751 CATH, INF, PER/CENT/MIDLINE: HCPCS

## 2018-12-03 PROCEDURE — 94664 DEMO&/EVAL PT USE INHALER: CPT

## 2018-12-03 PROCEDURE — 94761 N-INVAS EAR/PLS OXIMETRY MLT: CPT

## 2018-12-03 PROCEDURE — 99233 PR SUBSEQUENT HOSPITAL CARE,LEVL III: ICD-10-PCS | Mod: 24,,, | Performed by: NURSE PRACTITIONER

## 2018-12-03 PROCEDURE — 83615 LACTATE (LD) (LDH) ENZYME: CPT

## 2018-12-03 PROCEDURE — 76937 US GUIDE VASCULAR ACCESS: CPT

## 2018-12-03 PROCEDURE — 27000646 HC AEROBIKA DEVICE

## 2018-12-03 PROCEDURE — 97112 NEUROMUSCULAR REEDUCATION: CPT

## 2018-12-03 PROCEDURE — 80100014 HC HEMODIALYSIS 1:1

## 2018-12-03 RX ORDER — SODIUM CHLORIDE 9 MG/ML
INJECTION, SOLUTION INTRAVENOUS
Status: DISCONTINUED | OUTPATIENT
Start: 2018-12-03 | End: 2018-12-06

## 2018-12-03 RX ORDER — SODIUM CHLORIDE 9 MG/ML
INJECTION, SOLUTION INTRAVENOUS ONCE
Status: DISCONTINUED | OUTPATIENT
Start: 2018-12-03 | End: 2018-12-06

## 2018-12-03 RX ORDER — ALBUMIN HUMAN 250 G/1000ML
25 SOLUTION INTRAVENOUS EVERY 6 HOURS
Status: COMPLETED | OUTPATIENT
Start: 2018-12-03 | End: 2018-12-04

## 2018-12-03 RX ORDER — TIZANIDINE 2 MG/1
2 TABLET ORAL EVERY 8 HOURS PRN
Status: DISCONTINUED | OUTPATIENT
Start: 2018-12-03 | End: 2019-01-05

## 2018-12-03 RX ORDER — HYDROCODONE BITARTRATE AND ACETAMINOPHEN 500; 5 MG/1; MG/1
TABLET ORAL
Status: DISCONTINUED | OUTPATIENT
Start: 2018-12-03 | End: 2018-12-06

## 2018-12-03 RX ORDER — NYSTATIN 100000 [USP'U]/ML
500000 SUSPENSION ORAL
Status: DISCONTINUED | OUTPATIENT
Start: 2018-12-03 | End: 2018-12-13

## 2018-12-03 RX ORDER — TACROLIMUS 1 MG/1
2 CAPSULE ORAL 2 TIMES DAILY
Status: DISCONTINUED | OUTPATIENT
Start: 2018-12-04 | End: 2018-12-04

## 2018-12-03 RX ADMIN — SULFAMETHOXAZOLE AND TRIMETHOPRIM 1 TABLET: 400; 80 TABLET ORAL at 09:12

## 2018-12-03 RX ADMIN — FAMOTIDINE 20 MG: 20 TABLET ORAL at 09:12

## 2018-12-03 RX ADMIN — LINEZOLID 600 MG: 600 TABLET, FILM COATED ORAL at 09:12

## 2018-12-03 RX ADMIN — TACROLIMUS 3 MG: 1 CAPSULE ORAL at 08:12

## 2018-12-03 RX ADMIN — SODIUM BICARBONATE 650 MG TABLET 650 MG: at 08:12

## 2018-12-03 RX ADMIN — CEFEPIME 1 G: 1 INJECTION, POWDER, FOR SOLUTION INTRAMUSCULAR; INTRAVENOUS at 04:12

## 2018-12-03 RX ADMIN — INSULIN ASPART 2 UNITS: 100 INJECTION, SOLUTION INTRAVENOUS; SUBCUTANEOUS at 12:12

## 2018-12-03 RX ADMIN — HEPARIN SODIUM 5000 UNITS: 5000 INJECTION, SOLUTION INTRAVENOUS; SUBCUTANEOUS at 09:12

## 2018-12-03 RX ADMIN — URSODIOL 300 MG: 300 CAPSULE ORAL at 09:12

## 2018-12-03 RX ADMIN — MORPHINE 100 MG: 10 SOLUTION ORAL at 09:12

## 2018-12-03 RX ADMIN — LINEZOLID 600 MG: 600 TABLET, FILM COATED ORAL at 08:12

## 2018-12-03 RX ADMIN — INSULIN ASPART 2 UNITS: 100 INJECTION, SOLUTION INTRAVENOUS; SUBCUTANEOUS at 04:12

## 2018-12-03 RX ADMIN — PREDNISONE 10 MG: 5 TABLET ORAL at 08:12

## 2018-12-03 RX ADMIN — SODIUM BICARBONATE 650 MG TABLET 650 MG: at 05:12

## 2018-12-03 RX ADMIN — TIZANIDINE 4 MG: 4 TABLET ORAL at 08:12

## 2018-12-03 RX ADMIN — INSULIN ASPART 2 UNITS: 100 INJECTION, SOLUTION INTRAVENOUS; SUBCUTANEOUS at 08:12

## 2018-12-03 RX ADMIN — CEFEPIME 1 G: 1 INJECTION, POWDER, FOR SOLUTION INTRAMUSCULAR; INTRAVENOUS at 01:12

## 2018-12-03 RX ADMIN — HEPARIN SODIUM 5000 UNITS: 5000 INJECTION, SOLUTION INTRAVENOUS; SUBCUTANEOUS at 04:12

## 2018-12-03 RX ADMIN — IPRATROPIUM BROMIDE AND ALBUTEROL SULFATE 3 ML: .5; 3 SOLUTION RESPIRATORY (INHALATION) at 07:12

## 2018-12-03 RX ADMIN — TRAZODONE HYDROCHLORIDE 50 MG: 50 TABLET ORAL at 09:12

## 2018-12-03 RX ADMIN — ISAVUCONAZONIUM SULFATE 372 MG: 186 CAPSULE ORAL at 08:12

## 2018-12-03 RX ADMIN — NYSTATIN 500000 UNITS: 500000 SUSPENSION ORAL at 05:12

## 2018-12-03 RX ADMIN — NYSTATIN 500000 UNITS: 500000 SUSPENSION ORAL at 09:12

## 2018-12-03 RX ADMIN — ALBUMIN HUMAN 25 G: 0.25 SOLUTION INTRAVENOUS at 09:12

## 2018-12-03 RX ADMIN — IPRATROPIUM BROMIDE AND ALBUTEROL SULFATE 3 ML: .5; 3 SOLUTION RESPIRATORY (INHALATION) at 09:12

## 2018-12-03 NOTE — ASSESSMENT & PLAN NOTE
- c/o increased pain w deep breath today to right side.  CXR showed increased opacity in the inferior hemothorax on the right side since 11/26/2018.  Pulse ox 97-99% on RA.  Cont to monitor    - continues to have fluid to RLL.  WBC remains elevated.    - thoracentesis performed on 11/30. Fluid noted with 4k WBC, 93 SEGS. Cefepime added for treatment.  ID consulted to comment on abx.    - will assess w Chest CT

## 2018-12-03 NOTE — PT/OT/SLP PROGRESS
Physical Therapy Treatment    Patient Name:  Femi Enciso   MRN:  [id number]    Recommendations:     Discharge Recommendations:  nursing facility, skilled   Discharge Equipment Recommendations: (TBD)   Barriers to discharge: Decreased caregiver support (at current functional level)    Assessment:     Femi Enciso is a 53 y.o. male admitted with a medical diagnosis of S/P liver transplant.  He presents with the following impairments/functional limitations:  weakness, gait instability, impaired endurance, impaired balance, impaired cardiopulmonary response to activity, decreased safety awareness, impaired self care skills, impaired functional mobilty, decreased coordination, pain. Pt performing mobility with less assist needed. However, activity tolerance limited this date 2* weakness and fatigue. Pt would continue to benefit from skilled acute PT in order to address current deficits and progress functional mobility.     Rehab Prognosis:  good; patient would benefit from acute skilled PT services to address these deficits and reach maximum level of function.      Recent Surgery: Procedure(s) (LRB):  LAPAROTOMY, EXPLORATORY, AFTER LIVER TRANSPLANT, LIKELY  ABDOMINAL CLOSURE (N/A) 15 Days Post-Op    Plan:     During this hospitalization, patient to be seen 4 x/week to address the above listed problems via gait training, therapeutic activities, therapeutic exercises, neuromuscular re-education  · Plan of Care Expires:  12/19/18   Plan of Care Reviewed with: patient, mother, spouse    Subjective     Communicated with RN prior to session.  Patient found supine upon PT entry to room, agreeable to treatment.      Chief Complaint: fatigue   Pain/Comfort:  · Pain Rating 1: (reported chest pain following return to supine; did not rate)  · Location - Side 1: Bilateral  · Location - Orientation 1: upper  · Pain Addressed 1: Reposition, Nurse notified    Patients cultural, spiritual, Gnosticist conflicts given the current situation:  none noted     Objective:     Patient found with: telemetry, pulse ox (continuous)     General Precautions: Standard, fall, contact   Orthopedic Precautions:N/A   Braces: N/A     Functional Mobility:  · Bed Mobility:     · Rolling Left:  contact guard assistance with side rail  · Rolling Right: contact guard assistance with side rail   · Scooting: maximal assistance and of 2 persons with drawsheet to HOB   · Supine to Sit: moderate assistance with side rail (managing trunk)  · With cues for sequencing and technique   · Sit to Supine: maximal assistance  · Transfers:     · Sit to Stand:  moderate assistance and of 2 persons with no AD  · Balance:   · sitting: CGA-SBA       AM-PAC 6 CLICK MOBILITY  Turning over in bed (including adjusting bedclothes, sheets and blankets)?: 3  Sitting down on and standing up from a chair with arms (e.g., wheelchair, bedside commode, etc.): 2  Moving from lying on back to sitting on the side of the bed?: 2  Moving to and from a bed to a chair (including a wheelchair)?: 2  Need to walk in hospital room?: 1  Climbing 3-5 steps with a railing?: 1  Basic Mobility Total Score: 11       Therapeutic Activities and Exercises:   Sitting EOB x~15-20 min with CGA-SBA for balance assist. Performed self-care tasks with OT while PT provided postural cues and facilitated trunk movements during dynamic tasks.   Intermittent rest breaks taken during activities this date 2* increased fatigue.   Max encouragement provided by therapists, wife, and mother to complete standing trial.   Performed static standing x~15 sec with modAx2 for standing balance. Max v/c and t/c for increased hip ext, upright posture, forward gaze, and glute activation.   Pt with increased fatigue, limiting further progression of mobility     Patient left supine with all lines intact, call button in reach, RN notified and pt's wife and mother present..    GOALS:   Multidisciplinary Problems     Physical Therapy Goals        Problem:  Physical Therapy Goal    Goal Priority Disciplines Outcome Goal Variances Interventions   Physical Therapy Goal     PT, PT/OT Ongoing (interventions implemented as appropriate)     Description:  Goals to be met by: 2018     Patient will increase functional independence with mobility by performin. Supine to sit with Modified Lane -not met  2. Sit to stand transfer with Lane -not met  3. Bed to chair transfer with independence using no AD -not met  4. Gait  x150 feet with Supervision using LRAD. -not met  5. Lower extremity exercise program x20 reps per handout, with independence -not met                Problem: Physical Therapy Goal    Goal Priority Disciplines Outcome Goal Variances Interventions   Physical Therapy Goal     PT, PT/OT                      Time Tracking:     PT Received On: 18  PT Start Time: 1014     PT Stop Time: 1045  PT Total Time (min): 31 min     Billable Minutes: Neuromuscular Re-education 31  (co-tx with OT)    Treatment Type: Treatment  PT/PTA: PT     PTA Visit Number: 0     Ramandeep Kumar, PT, DPT   12/3/2018  [phone]

## 2018-12-03 NOTE — ASSESSMENT & PLAN NOTE
- Significant increase in WBC post-op.   - OR cultures from 11/18 noted with enterococcus VRE.   - Abdominal CT performed at that time with fluid collection and drain placed on 11/22 for drainage.   - Intra-abdominal abscess culture also with enterococcus VRE.   - ID consulted and pt placed on antibxs for treatment. Pt was on Cefepime, Daptomycin, and Fluconazole for treatment.    - Pt remains with RLQ drains (one JOSE A and one Percutaneous).  One JOSE A removed 11/28.  Perc drain in placed draining tan, clear drainage.  WBC slowly improving.   - Liver US on 11/26 reviewed.   - Abdominal CT performed 11/27 and reviewed. Fluid collection noted with improvement on CT.  ID following and reassured by findings.    - Dapto, Cefepime and Flagyl changed 11/30/18 to Linezolid 600 mg PO q 12 hr x 10 days per ID.     - added back cefepime after thora.  ID re consulted.

## 2018-12-03 NOTE — PLAN OF CARE
"Problem: Patient Care Overview  Goal: Plan of Care Review  Dx: Liver transplant (11/11)  hx: ESLD, ETOH abuse; Severe depression.    11/12: liver transplant; extubated. Products given in OR and HD in OR. CRRT nocturnal.  11/13: CRRT nocturnal held; lines removed. FFP x1 given. Pulled out 2 JOSE A drains.   11/14: 3 PRBC's given for hgb 6; liver US shows potential hematoma. Trend CBC. Extreme agitation/attempted to "kill self" by holding breath for as long as possible multiple times. IV ativan/haldol given. Nocturnal CRRT restarted  Potential washout  To be determined.    11/16: OR for exlap and washout--1U PRBCs and 1 plts; sent back to OR--3 U PRBCs, 1FFP, 1cryo; abd open and wound vac placed  11/17: CT chest, abd, pelvis, levo off 2 units PRBCs, 2 units platelets, 1 unit FFP   11/18: OR for closure, extubated   11/19: clear liquid diet   11/20: TPN/Lipids d/c'd, renal diet, transfer orders  11/21: Head and chest CT  11/22: Pt to IR for drain placement  11/23: HD trial  11/25: HD 2 Liters removed  Nursing:   -160          Outcome: Ongoing (interventions implemented as appropriate)  -Pt AAOx4  -Vital signs stable  -BG monitoring ACHS; BG HS was 171, *no SSI coverage needed  -Chevron incision CDI; ECTOR with staples  -PRN Tizanidine given x1 so far thus shift  -Fall precautions maintained, non skid socks worn  -No acute events/falls/injuries this shift  -Mother at bedside  -Pt aware to call for help with ambulating.    -Bed lowered, locked, siderails up x2, and call bell in reach.     See flowsheet for assessment findings.  Will continue to monitor pt.       "

## 2018-12-03 NOTE — ASSESSMENT & PLAN NOTE
- 54yo male PMHXof DM2 and alcohol dependence with associated hepatitis was admitted on 10/27/2018 with acute liver failure ultimately underwent liver transplantation (s/p DBDLT 11/11/2018, CMV D+/R-, steroid induction, on maintenance tacro/MMF/pred  - c/b post-operative delirium, VRE bacteruria, and IA hemorrhage requiring re-do ex-lap RP/retrorenal/retrocolic hematoma evacuation  - ID was consulted on 11/18 for VRE.faecium infected IA hematoma (with growth from washout cultures).   - Was on linezolid 600mg PO q12h for an additional 10 day (stop date: 12/8/2018)  - on posaconazole for mold prophylaxis per institutional protocol (risk factor: hemorrhage/washout)  - ID called back 12/3/18 for recommendations in setting of acute right sided empyema. S/P thoracentesis WBC 3969 segs 91%  - recommend to start cefepime will follow cultures to determine if coverage will need adjustment  - remainder of prophylaxis per protocol  - follow-up per primary team    Recommendations:  - start cefepime   - continue linezolid  - continue posaconazole

## 2018-12-03 NOTE — ASSESSMENT & PLAN NOTE
- DEL past 2 weeks.  Pt on Midodrine.  Last HD 11/9/18- 0 fluid removed 2/2 hypotension.  - Nephrology following for HD-  Pt cont to have hypotenstion worse w standing.  Midodrine 10 mg PRN for hypotension during HD ordered.

## 2018-12-03 NOTE — ASSESSMENT & PLAN NOTE
- 53 year old male with history of ESLD 2/2 ETOH cirrhosis who is s/p liver transplant c/b take-back x 3 for bleeding most recently 11/18.  - LFTs now improving slowly.  T bili was 37.6 day of transplant.  - Pt remains with significant debility. Pt will need SNF placement when medically stable.   - Appetite slowly improving.  Tolerating supplements.  Encourage PO intake.   - Drain placed during take back. Drain placed to intra-abdominal abscess on 11/22.   - Fluid from collection noted with enterococcus VRE. Cont with antibxs per ID.  De escalated to PO on 11/29/18.    - Abdominal pain well controlled, and having BMs.    - HD per nephrology. Plan for HD today at bedside- plan to remove up to 1L.   - Muscle relaxer started and will hold off on oxycodone for now.

## 2018-12-03 NOTE — ASSESSMENT & PLAN NOTE
Managed per LTS.   avoid hypoglycemia  Optimize BG control for surgical wound healing.     Lab Results   Component Value Date    ALT 26 12/03/2018    AST 17 12/03/2018     (H) 11/26/2018    ALKPHOS 261 (H) 12/03/2018    BILITOT 2.6 (H) 12/03/2018

## 2018-12-03 NOTE — PLAN OF CARE
Problem: Physical Therapy Goal  Goal: Physical Therapy Goal  Goals to be met by: 2018     Patient will increase functional independence with mobility by performin. Supine to sit with Modified [place] -not met  2. Sit to stand transfer with [place] -not met  3. Bed to chair transfer with independence using no AD -not met  4. Gait  x150 feet with Supervision using LRAD. -not met  5. Lower extremity exercise program x20 reps per handout, with independence -not met        Outcome: Ongoing (interventions implemented as appropriate)  Goals reviewed and remain appropriate. Pt progressing towards goals.    Ramandeep Kumar, PT, DPT   12/3/2018  [phone]

## 2018-12-03 NOTE — ASSESSMENT & PLAN NOTE
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   - s/p OHLTx 11/11/2018; intra-op SLED  - remaining anuric, but clearance numbers look ok, on SLED overnight  -hemodynamically stable, off pressors    -remaining w/o significant UOP  -no HD since Saturday  -come worsening SOB  -doubt worsening MS, from lack of RRT, but will do today for volume and metabolic clearance and if this is a component may help some

## 2018-12-03 NOTE — CONSULTS
Midline placed in right basilic vein, 18g x 8cm size.  Max dwell date 1/1/2019.  Lot# [id number].  Needle advanced using realtime u/s guidance.

## 2018-12-03 NOTE — SUBJECTIVE & OBJECTIVE
"Interval HPI:   Overnight events: Remains in TSU, NAEON.  1 unit PRBCs given yesterday.  BG mildly elevated yesterday during the day, above goal this am (248). Prednisone 10 mg daily, standard steroid taper.  On IV antibiotics.  Noted rise in creatinine from 3 to 3.5.  Eating:   <25%  Nausea: No  Hypoglycemia and intervention: No  Fever: No  TPN and/or TF: No    /70 (BP Location: Right arm, Patient Position: Lying)   Pulse 101   Temp 98.1 °F (36.7 °C) (Oral)   Resp 20   Ht 5' 10" (1.778 m)   Wt 75.7 kg (166 lb 14.2 oz)   SpO2 99%   BMI 23.95 kg/m²     Labs Reviewed and Include    Recent Labs   Lab 12/03/18  0630   *   CALCIUM 8.0*   ALBUMIN 1.4*   PROT 4.7*      K 4.7   CO2 22*      BUN 53*   CREATININE 3.5*   ALKPHOS 261*   ALT 26   AST 17   BILITOT 2.6*     Lab Results   Component Value Date    WBC 11.64 12/03/2018    HGB 7.1 (L) 12/03/2018    HCT 22.2 (L) 12/03/2018    MCV 95 12/03/2018     12/03/2018     No results for input(s): TSH, FREET4 in the last 168 hours.  Lab Results   Component Value Date    HGBA1C 4.4 11/09/2018       Nutritional status:   Body mass index is 23.95 kg/m².  Lab Results   Component Value Date    ALBUMIN 1.4 (L) 12/03/2018    ALBUMIN 1.4 (L) 12/02/2018    ALBUMIN 1.6 (L) 12/01/2018     Lab Results   Component Value Date    PREALBUMIN 7 (L) 10/27/2018       Estimated Creatinine Clearance: 25.2 mL/min (A) (based on SCr of 3.5 mg/dL (H)).    Accu-Checks  Recent Labs     11/30/18  2354 12/01/18  0743 12/01/18  1152 12/01/18  1721 12/01/18  2149 12/02/18  0822 12/02/18  1209 12/02/18  1701 12/02/18  2105 12/03/18  0758   POCTGLUCOSE 183* 157* 218* 226* 187* 214* 197* 207* 171* 248*       Current Medications and/or Treatments Impacting Glycemic Control  Immunotherapy:    Immunosuppressants         Stop Route Frequency     tacrolimus capsule 3 mg      -- Oral 2 times daily        Steroids:   Hormones (From admission, onward)    Start     Stop Route " Frequency Ordered    12/17/18 0900  predniSONE tablet 2.5 mg      12/24 0859 Oral Daily 11/26/18 1115    12/10/18 0900  predniSONE tablet 5 mg      12/17 0859 Oral Daily 11/26/18 1115    12/03/18 0900  predniSONE tablet 10 mg      12/10 0859 Oral Daily 11/26/18 1115    11/09/18 1345  methylPREDNISolone sodium succinate injection 500 mg  (Med - Immunosuppression Induction Therapy (Methylprednisolone))      11/10 0144 IV Once pre-op 11/09/18 1236        Pressors:    Autonomic Drugs (From admission, onward)    Start     Stop Route Frequency Ordered    11/30/18 1058  midodrine tablet 10 mg     Question:  Is the patient competent?  Answer:  Yes    -- Oral 3 times daily PRN 11/30/18 0958        Hyperglycemia/Diabetes Medications:   Antihyperglycemics (From admission, onward)    Start     Stop Route Frequency Ordered    12/01/18 1215  insulin aspart U-100 pen 2 Units      -- SubQ 3 times daily with meals 12/01/18 1102    11/27/18 1122  insulin aspart U-100 pen 0-5 Units      -- SubQ Before meals & nightly PRN 11/27/18 1023

## 2018-12-03 NOTE — PLAN OF CARE
Problem: Occupational Therapy Goal  Goal: Occupational Therapy Goal  Goals to be met by: 12/5/18     Patient will increase functional independence with ADLs by performing:    UE Dressing with Supervision.  LE Dressing with Minimal Assistance.  Grooming while standing at sink with Stand-by Assistance.  Toileting from toilet with Minimal Assistance for hygiene and clothing management.   Toilet transfer to toilet with Stand-by Assistance.  Upper extremity  theraband exercise program x  15 reps  with independence.       Outcome: Ongoing (interventions implemented as appropriate)  Continue with POC. Pt progressing well with goals. Continue with POC.   Julia ladd OT  12/3/2018

## 2018-12-03 NOTE — ASSESSMENT & PLAN NOTE
- Renal function slow to recover post-op.   - Nephrology consulted for management.   - Cont with HD as per nephrology recs.  Apprec recs.    - HD on 11/27 w/ 2L off. Pt tolerated well.    - Lasix 160 mg challenge 11/28 w/ minimal output.    - HD performed on 11/30.  Plan for HD tonight at bedside.   - Strict I&Os.

## 2018-12-03 NOTE — ASSESSMENT & PLAN NOTE
- H&H low over the wkd, transfused 1u PRBC 12/2 w appropriate response.  H/H decreased again today.  Plan to transfuse 1u PRBC.    - stool for occult blood.  Pt LBM 11/29.  No evidence of acute bleed.  Plan for Chest/Abd/pelvis CT to assess for source of bleeding.

## 2018-12-03 NOTE — PROGRESS NOTES
[facility]  Infectious Disease  Progress Note    Patient Name: Femi Enciso  MRN: [id number]  Admission Date: 10/27/2018  Length of Stay: 37 days  Attending Physician: Femi Patel MD  Primary Care Provider: Primary Doctor No    Isolation Status: No active isolations  Assessment/Plan:      Delirium    - 54yo male PMHXof DM2 and alcohol dependence with associated hepatitis was admitted on 10/27/2018 with acute liver failure ultimately underwent liver transplantation (s/p DBDLT 11/11/2018, CMV D+/R-, steroid induction, on maintenance tacro/MMF/pred  - c/b post-operative delirium, VRE bacteruria, and IA hemorrhage requiring re-do ex-lap RP/retrorenal/retrocolic hematoma evacuation  - ID was consulted on 11/18 for VRE.faecium infected IA hematoma (with growth from washout cultures).   - Was on linezolid 600mg PO q12h for an additional 10 day (stop date: 12/8/2018)  - on posaconazole for mold prophylaxis per institutional protocol (risk factor: hemorrhage/washout)  - ID called back 12/3/18 for recommendations in setting of acute right sided empyema. S/P thoracentesis WBC 3969 segs 91%  - recommend to start cefepime will follow cultures to determine if coverage will need adjustment  - remainder of prophylaxis per protocol  - follow-up per primary team    Recommendations:  - start cefepime   - continue linezolid  - continue posaconazole          Anticipated Disposition:     Thank you for your consult. I will follow-up with patient. Please contact us if you have any additional questions.    Solitario Layton MD  Infectious Disease  [facility]    Subjective:     Principal Problem:S/P liver transplant    HPI: No notes on file  Interval History: ID called back to evaluate patient after development of empyema since last evaluation    Review of Systems  Objective:     Vital Signs (Most Recent):  Temp: 97.8 °F (36.6 °C) (12/03/18 1626)  Pulse: 106 (12/03/18 1626)  Resp: (!) 31 (12/03/18  1626)  BP: 118/80 (12/03/18 1626)  SpO2: 98 % (12/03/18 1626) Vital Signs (24h Range):  Temp:  [95.2 °F (35.1 °C)-98.5 °F (36.9 °C)] 97.8 °F (36.6 °C)  Pulse:  [] 106  Resp:  [20-32] 31  SpO2:  [96 %-99 %] 98 %  BP: (104-124)/(66-80) 118/80     Weight: 75.7 kg (166 lb 14.2 oz)  Body mass index is 23.95 kg/m².    Estimated Creatinine Clearance: 25.2 mL/min (A) (based on SCr of 3.5 mg/dL (H)).    Physical Exam   Constitutional: He is oriented to person, place, and time. He appears well-developed and well-nourished.   HENT:   Head: Normocephalic and atraumatic.   Eyes: Conjunctivae and EOM are normal. Pupils are equal, round, and reactive to light.   Neck: Normal range of motion. Neck supple.   Cardiovascular: Normal rate, regular rhythm and normal heart sounds.   Pulmonary/Chest: Effort normal. He has rales.   Abdominal: Soft. Bowel sounds are normal. He exhibits no distension. There is no tenderness. There is no rebound.   Musculoskeletal: Normal range of motion. He exhibits no edema, tenderness or deformity.   Neurological: He is alert and oriented to person, place, and time.   Skin: No rash noted. No erythema.       Significant Labs:   Blood Culture:   Recent Labs   Lab 11/14/18  1500 11/21/18  1730 11/21/18  1742 11/30/18  1102 11/30/18  1107   LABBLOO No growth after 5 days. No growth after 5 days. No growth after 5 days. No Growth to date  No Growth to date  No Growth to date  No Growth to date No Growth to date  No Growth to date  No Growth to date  No Growth to date     BMP:   Recent Labs   Lab 12/03/18  0630   *      K 4.7      CO2 22*   BUN 53*   CREATININE 3.5*   CALCIUM 8.0*   MG 1.8     CBC:   Recent Labs   Lab 12/02/18  0650 12/02/18  1016 12/02/18  2005 12/03/18  0630   WBC 13.25*  --   --  11.64   HGB 6.4* 6.9* 8.1* 7.1*   HCT 20.7* 22.1* 25.3* 22.2*     --   --  241     CMP:   Recent Labs   Lab 12/02/18  0650 12/03/18  0630   * 136   K 4.4 4.7    105    CO2 24 22*   * 223*   BUN 42* 53*   CREATININE 3.0* 3.5*   CALCIUM 8.0* 8.0*   PROT 4.5* 4.7*   ALBUMIN 1.4* 1.4*   BILITOT 2.8* 2.6*   ALKPHOS 242* 261*   AST 21 17   ALT 30 26   ANIONGAP 7* 9   EGFRNONAA 22.7* 18.8*     Microbiology Results (last 7 days)     Procedure Component Value Units Date/Time    Urine culture [[id number]] Collected:  11/30/18 1201    Order Status:  Completed Specimen:  Urine, Clean Catch Updated:  12/03/18 1455     Urine Culture, Routine --     LANIE GLABRATA  > 100,000 cfu/ml  Treatment of asymptomatic candiduria is not recommended (except for   specific populations). Candida isolated in the urine typically   represents colonization. If an indwelling urinary catheter is present  it should be removed or replaced.      Culture, Body Fluid (Aerobic) w/ GS [[id number]] Collected:  11/30/18 1617    Order Status:  Completed Specimen:  Body Fluid from Pleural Fluid Updated:  12/03/18 1310     AEROBIC CULTURE - FLUID No growth     Gram Stain Result Rare WBC's      No organisms seen    Blood culture [[id number]] Collected:  11/30/18 1107    Order Status:  Completed Specimen:  Blood Updated:  12/03/18 1212     Blood Culture, Routine No Growth to date     Blood Culture, Routine No Growth to date     Blood Culture, Routine No Growth to date     Blood Culture, Routine No Growth to date    Narrative:       Draw 15 min apart    Blood culture [[id number]] Collected:  11/30/18 1102    Order Status:  Completed Specimen:  Blood Updated:  12/03/18 1212     Blood Culture, Routine No Growth to date     Blood Culture, Routine No Growth to date     Blood Culture, Routine No Growth to date     Blood Culture, Routine No Growth to date    Narrative:       Draw 15 min apart    Fungus culture [[id number]] Collected:  11/30/18 1617    Order Status:  Completed Specimen:  Body Fluid from Pleural Fluid Updated:  12/03/18 0949     Fungus (Mycology) Culture Culture in progress    Narrative:       Right    Fungus  culture [[id number]] Collected:  11/18/18 1530    Order Status:  Completed Specimen:  Body Fluid from Peritoneal Fluid Updated:  12/03/18 0842     Fungus (Mycology) Culture Culture in progress     Fungus (Mycology) Culture No fungus isolated after 2 weeks    Narrative:       PELVIC FLUID    Fungus culture [[id number]] Collected:  11/18/18 1530    Order Status:  Completed Specimen:  Body Fluid from Abdomen Updated:  12/03/18 0842     Fungus (Mycology) Culture Culture in progress     Fungus (Mycology) Culture No fungus isolated after 2 weeks    Narrative:       Abdominal Fluid    Urine culture [[id number]] Collected:  12/01/18 1530    Order Status:  Completed Specimen:  Urine, Clean Catch Updated:  12/02/18 1958     Urine Culture, Routine No growth    Gram stain [[id number]] Collected:  11/30/18 1617    Order Status:  Canceled Specimen:  Body Fluid from Pleural Fluid Updated:  11/30/18 2331    Culture, Anaerobic [[id number]] Collected:  11/30/18 1617    Order Status:  Sent Specimen:  Body Fluid from Pleural Fluid Updated:  11/30/18 2330    Fungus culture [[id number]] Collected:  11/11/18 0415    Order Status:  Completed Specimen:  Pleural Fluid from Ascites Updated:  11/27/18 1017     Fungus (Mycology) Culture Culture in progress     Fungus (Mycology) Culture No fungus isolated after 2 weeks    Aerobic culture [[id number]]  (Susceptibility) Collected:  11/22/18 1307    Order Status:  Completed Specimen:  Body Fluid from Liver Updated:  11/27/18 0928     Aerobic Bacterial Culture --     ENTEROCOCCUS FAECIUM VRE  Rare      Narrative:       Perihepatic fluid collection    Blood culture [[id number]] Collected:  11/21/18 1730    Order Status:  Completed Specimen:  Blood from Peripheral, Hand, Left Updated:  11/26/18 2012     Blood Culture, Routine No growth after 5 days.    Blood culture [[id number]] Collected:  11/21/18 1742    Order Status:  Completed Specimen:  Blood from Peripheral, Forearm, Right Updated:  11/26/18 2012      Blood Culture, Routine No growth after 5 days.          Significant Imaging: I have reviewed all pertinent imaging results/findings within the past 24 hours.

## 2018-12-03 NOTE — PROGRESS NOTES
"[facility]  Endocrinology  Progress Note    Admit Date: 10/27/2018     Reason for Consult: Management of Hyperglycemia and type 2 DM    Surgical Procedure and Date: liver transplant 11/11/18; exploratory lap and liver biopsy on 11/16/18      Diabetes diagnosis year: ~ 3-4 years ago     Home Diabetes Medications:  none; previously on metformin 1000 mg BID    How often checking glucose at home? Not checking  BG readings on regimen: n/a  Hypoglycemia on the regimen?  n/a  Missed doses on regimen? n/a    Diabetes Complications include:     DORIAN    Complicating diabetes co morbidities:   CIRRHOSIS and Glucocorticoid use       HPI:   Patient is a 53 y.o. male with a diagnosis of ESLD secondary to ETOH abuse and DEL with ESRD with RRT. Patient is now s/p liver transplant. Endocrinology consulted for BG management.       Lab Results   Component Value Date    HGBA1C 4.4 11/09/2018             Interval HPI:   Overnight events: Remains in TSU, NAEON.  1 unit PRBCs given yesterday.  BG mildly elevated yesterday during the day, above goal this am (248). Prednisone 10 mg daily, standard steroid taper.  On IV antibiotics.  Noted rise in creatinine from 3 to 3.5.  Eating:   <25%  Nausea: No  Hypoglycemia and intervention: No  Fever: No  TPN and/or TF: No    /70 (BP Location: Right arm, Patient Position: Lying)   Pulse 101   Temp 98.1 °F (36.7 °C) (Oral)   Resp 20   Ht 5' 10" (1.778 m)   Wt 75.7 kg (166 lb 14.2 oz)   SpO2 99%   BMI 23.95 kg/m²      Labs Reviewed and Include    Recent Labs   Lab 12/03/18  0630   *   CALCIUM 8.0*   ALBUMIN 1.4*   PROT 4.7*      K 4.7   CO2 22*      BUN 53*   CREATININE 3.5*   ALKPHOS 261*   ALT 26   AST 17   BILITOT 2.6*     Lab Results   Component Value Date    WBC 11.64 12/03/2018    HGB 7.1 (L) 12/03/2018    HCT 22.2 (L) 12/03/2018    MCV 95 12/03/2018     12/03/2018     No results for input(s): TSH, FREET4 in the last 168 hours.  Lab " Results   Component Value Date    HGBA1C 4.4 11/09/2018       Nutritional status:   Body mass index is 23.95 kg/m².  Lab Results   Component Value Date    ALBUMIN 1.4 (L) 12/03/2018    ALBUMIN 1.4 (L) 12/02/2018    ALBUMIN 1.6 (L) 12/01/2018     Lab Results   Component Value Date    PREALBUMIN 7 (L) 10/27/2018       Estimated Creatinine Clearance: 25.2 mL/min (A) (based on SCr of 3.5 mg/dL (H)).    Accu-Checks  Recent Labs     11/30/18  2354 12/01/18  0743 12/01/18  1152 12/01/18  1721 12/01/18  2149 12/02/18  0822 12/02/18  1209 12/02/18  1701 12/02/18  2105 12/03/18  0758   POCTGLUCOSE 183* 157* 218* 226* 187* 214* 197* 207* 171* 248*       Current Medications and/or Treatments Impacting Glycemic Control  Immunotherapy:    Immunosuppressants         Stop Route Frequency     tacrolimus capsule 3 mg      -- Oral 2 times daily        Steroids:   Hormones (From admission, onward)    Start     Stop Route Frequency Ordered    12/17/18 0900  predniSONE tablet 2.5 mg      12/24 0859 Oral Daily 11/26/18 1115    12/10/18 0900  predniSONE tablet 5 mg      12/17 0859 Oral Daily 11/26/18 1115    12/03/18 0900  predniSONE tablet 10 mg      12/10 0859 Oral Daily 11/26/18 1115    11/09/18 1345  methylPREDNISolone sodium succinate injection 500 mg  (Med - Immunosuppression Induction Therapy (Methylprednisolone))      11/10 0144 IV Once pre-op 11/09/18 1236        Pressors:    Autonomic Drugs (From admission, onward)    Start     Stop Route Frequency Ordered    11/30/18 1058  midodrine tablet 10 mg     Question:  Is the patient competent?  Answer:  Yes    -- Oral 3 times daily PRN 11/30/18 0958        Hyperglycemia/Diabetes Medications:   Antihyperglycemics (From admission, onward)    Start     Stop Route Frequency Ordered    12/01/18 1215  insulin aspart U-100 pen 2 Units      -- SubQ 3 times daily with meals 12/01/18 1102    11/27/18 1122  insulin aspart U-100 pen 0-5 Units      -- SubQ Before meals & nightly PRN 11/27/18 1021           ASSESSMENT and PLAN    * S/P liver transplant    Managed per LTS.   avoid hypoglycemia  Optimize BG control for surgical wound healing.     Lab Results   Component Value Date    ALT 26 12/03/2018    AST 17 12/03/2018     (H) 11/26/2018    ALKPHOS 261 (H) 12/03/2018    BILITOT 2.6 (H) 12/03/2018            Type 2 diabetes mellitus without complication    BG goal 140-180    Novolog 2 units with meals  Low dose correction scale given kidney function  BG monitoring AC/HS    Discharge plans- TBD     Adrenal cortical steroids causing adverse effect in therapeutic use    On standard steroid taper per transplant team; may elevate BG readings         DEL (acute kidney injury)    Avoid insulin stacking and hypoglycemia.  Nephrology following  Lab Results   Component Value Date    CREATININE 3.5 (H) 12/03/2018            Prophylactic immunotherapy    May increase insulin resistance.            Zoran Almeida NP  Endocrinology  [facility]

## 2018-12-03 NOTE — PT/OT/SLP PROGRESS
Occupational Therapy   Treatment    Name: Femi Enciso  MRN: [id number]  Admitting Diagnosis:  S/P liver transplant  15 Days Post-Op    Recommendations:     Discharge Recommendations: nursing facility, skilled  Discharge Equipment Recommendations:  (TBD)  Barriers to discharge:  None    Subjective     Pain/Comfort:  · Pain Rating 1: (reported chest pain upon return to supine-- did not rate)  · Location - Side 1: Bilateral  · Location - Orientation 1: upper  · Location 1: chest  · Pain Addressed 1: Pre-medicate for activity, Nurse notified    Objective:     Communicated with: RN prior to session.  Patient found with all lines intact, call button in reach and RN notified and telemetry, pulse ox (continuous) upon OT entry to room.    General Precautions: Standard, fall, contact   Orthopedic Precautions:N/A   Braces: N/A     Occupational Performance:    Bed Mobility:    · Patient completed Rolling/Turning to Left with  contact guard assistance  · Patient completed Rolling/Turning to Right with contact guard assistance  · Patient completed Scooting/Bridging with maximal assistance and 2 persons ( scooting towards HOB using drawsheet; increased assistance 2/2 fatigue)  · Patient completed Supine to Sit with moderate assistance  · Patient completed Sit to Supine with maximal assistance ( increased assistance 2/2 fatigue)    Sitting EOB:   Pt sat EOB ~25 minutes with SBA ( static sitting balance) and CGA ( dynamic sitting balance)    Functional Mobility/Transfers:  · Patient completed Sit <> Stand Transfer with moderate assistance and of 2 persons  with  no assistive device from EOB; pt declined further functional transfer/mobilty 2/2 fatigue and requesting to lay down; despite max encouragement pt declined    Activities of Daily Living:  · Feeding:  stand by assistance with set-up assistance to drink from cup while seated EOB  · Grooming: contact guard assistance to complete oral care and wash face ( supplies on table top at  far distance challenging trunk control and dyanmic sitting balance to retrieve items)   · Upper Body Dressing: minimum assistance to milady gown like jacket while seated EOB  · Lower Body Dressing: maximal assistance to milady B socks while supine    AMPAC 6 Click ADL: 15    Treatment & Education:  -Pt edu on OT role/POC  -Importance of OOB activity with staff assistance ( UIC throughout the day; EOB or in chair to feed self with assistance)  -Safety during functional t/f and mobility  - White board updated  - Multiple self care tasks completed-- assistance level noted above  - All questions/concerns answered within OT scope of practice    Patient left with bed in chair position with all lines intact, bed alarm on and RN notified  Education:    Assessment:     Femi Enciso is a 53 y.o. male with a medical diagnosis of S/P liver transplant. Pt alert and willing to participate in therapy. He presents with the following performance deficits affecting function are weakness, gait instability, impaired balance, impaired endurance, impaired self care skills, impaired functional mobilty, pain, impaired skin, edema, impaired cognition, decreased safety awareness. Pt progressing well bed mobility/transfers and self care goals this date however functional mobility limited 2/2 pt fatigue. He would benefit from SNF following d/c to continue to progress towards goals and improve quality of life.     Rehab Prognosis:  Good; patient would benefit from acute skilled OT services to address these deficits and reach maximum level of function.       Plan:     Patient to be seen 4 x/week to address the above listed problems via self-care/home management, therapeutic activities, therapeutic exercises, neuromuscular re-education  · Plan of Care Expires: 12/21/18  · Plan of Care Reviewed with: patient, spouse, mother    This Plan of care has been discussed with the patient who was involved in its development and understands and is in agreement  with the identified goals and treatment plan    GOALS:   Multidisciplinary Problems     Occupational Therapy Goals        Problem: Occupational Therapy Goal    Goal Priority Disciplines Outcome Interventions   Occupational Therapy Goal     OT, PT/OT Ongoing (interventions implemented as appropriate)    Description:  Goals to be met by: 12/5/18     Patient will increase functional independence with ADLs by performing:    UE Dressing with Supervision.  LE Dressing with Minimal Assistance.  Grooming while standing at sink with Stand-by Assistance.  Toileting from toilet with Minimal Assistance for hygiene and clothing management.   Toilet transfer to toilet with Stand-by Assistance.  Upper extremity  theraband exercise program x  15 reps  with independence.               Problem: Occupational Therapy Goal    Goal Priority Disciplines Outcome Interventions   Occupational Therapy Goal     OT, PT/OT Ongoing (interventions implemented as appropriate)                    Time Tracking:     OT Date of Treatment: 12/03/18  OT Start Time: 1011  OT Stop Time: 1045  OT Total Time (min): 34 min    Billable Minutes:Self Care/Home Management 34    Julia ladd OT  12/3/2018

## 2018-12-03 NOTE — SUBJECTIVE & OBJECTIVE
Scheduled Meds:   sodium chloride 0.9%   Intravenous Once    albumin human 25%  25 g Intravenous Q6H    albuterol-ipratropium  3 mL Nebulization Q4H WAKE    ceFEPime (MAXIPIME) IVPB  1 g Intravenous Q12H    ergocalciferol  50,000 Units Oral Q7 Days    famotidine  20 mg Oral QHS    heparin (porcine)  5,000 Units Subcutaneous Q8H    insulin aspart U-100  2 Units Subcutaneous TIDWM    isavuconazonium sulfate  372 mg Oral Daily    lidocaine HCL 10 mg/ml (1%)  1 mL Intradermal Once    linezolid  600 mg Oral Q12H    nystatin  500,000 Units Oral QID (WM & HS)    predniSONE  10 mg Oral Daily    Followed by    [START ON 12/10/2018] predniSONE  5 mg Oral Daily    Followed by    [START ON 12/17/2018] predniSONE  2.5 mg Oral Daily    sodium bicarbonate  650 mg Oral BID    sulfamethoxazole-trimethoprim 400-80mg  1 tablet Oral Every Mon, Wed, Fri    [START ON 12/4/2018] tacrolimus  2 mg Oral BID    traZODone  50 mg Oral QHS    ursodiol  300 mg Oral BID    valganciclovir 50 mg/ml  100 mg Oral Once per day on Mon Wed Fri     Continuous Infusions:  PRN Meds:sodium chloride, sodium chloride 0.9%, sodium chloride 0.9%, albuterol-ipratropium, dextrose 50%, dextrose 50%, glucagon (human recombinant), glucose, glucose, heparin (porcine), insulin aspart U-100, midodrine, ondansetron, tiZANidine, traZODone    Review of Systems   Constitutional: Positive for activity change, appetite change (improving) and fatigue. Negative for fever.   HENT: Negative.  Negative for facial swelling.    Eyes: Negative.    Respiratory: Positive for shortness of breath (w exertion). Negative for apnea, chest tightness and wheezing.    Cardiovascular: Negative.  Negative for chest pain, palpitations and leg swelling.   Gastrointestinal: Positive for abdominal distention and abdominal pain. Negative for constipation, diarrhea, nausea and vomiting.   Genitourinary: Positive for decreased urine volume. Negative for difficulty urinating,  dysuria, hematuria and urgency.   Musculoskeletal: Negative.  Negative for back pain, gait problem, neck pain and neck stiffness.   Skin: Positive for wound. Negative for color change and pallor.   Allergic/Immunologic: Positive for immunocompromised state.   Neurological: Positive for weakness. Negative for dizziness, seizures, light-headedness and headaches.   Psychiatric/Behavioral: Positive for decreased concentration and dysphoric mood. Negative for behavioral problems, confusion, hallucinations, sleep disturbance and suicidal ideas. The patient is not nervous/anxious.      Objective:     Vital Signs (Most Recent):  Temp: 97.8 °F (36.6 °C) (12/03/18 1626)  Pulse: 106 (12/03/18 1626)  Resp: (!) 31 (12/03/18 1626)  BP: 118/80 (12/03/18 1626)  SpO2: 98 % (12/03/18 1626) Vital Signs (24h Range):  Temp:  [95.2 °F (35.1 °C)-98.5 °F (36.9 °C)] 97.8 °F (36.6 °C)  Pulse:  [] 106  Resp:  [20-32] 31  SpO2:  [96 %-99 %] 98 %  BP: (104-124)/(66-80) 118/80     Weight: 75.7 kg (166 lb 14.2 oz)  Body mass index is 23.95 kg/m².    Intake/Output - Last 3 Shifts       12/01 0700 - 12/02 0659 12/02 0700 - 12/03 0659 12/03 0700 - 12/04 0659    P.O. 1160 970 240    Blood  230     Other       Total Intake(mL/kg) 1160 (15.4) 1200 (15.9) 240 (3.2)    Urine (mL/kg/hr) 0 (0) 0 (0)     Emesis/NG output 0 0     Other 0 0     Stool 0 0     Blood 0 0     Total Output 0 0     Net +1160 +1200 +240           Urine Occurrence  0 x     Stool Occurrence  0 x     Emesis Occurrence  0 x           Physical Exam   Constitutional: He is oriented to person, place, and time. He appears well-developed. No distress.   HENT:   Head: Normocephalic and atraumatic.   White drainage noted to back of throat and roof on mouth, voice hoarse   Eyes: Pupils are equal, round, and reactive to light. Scleral icterus is present.   Neck: Normal range of motion. Neck supple. No JVD present.   Cardiovascular: Normal rate, regular rhythm and normal heart sounds.   No  murmur heard.  Pulmonary/Chest: Effort normal. No respiratory distress. He has decreased breath sounds in the right lower field and the left lower field. He has no wheezes. He exhibits no tenderness.   Taking short breaths, IS up to 500   Abdominal: Soft. Bowel sounds are normal. He exhibits no distension. There is tenderness.   Chevron inc ECTOR w/ staples  RLQ JOSE A drain and percutaneous drain site w suture CDI.    Musculoskeletal: Normal range of motion. He exhibits edema. He exhibits no tenderness.   Neurological: He is alert and oriented to person, place, and time. He has normal reflexes.   Skin: Skin is warm and dry. He is not diaphoretic.   Psychiatric: His affect is labile. He is slowed. Cognition and memory are impaired.   Nursing note and vitals reviewed.      Laboratory:  Immunosuppressants         Stop Route Frequency     tacrolimus capsule 2 mg      -- Oral 2 times daily        CBC:   Recent Labs   Lab 12/03/18  0630   WBC 11.64   RBC 2.35*   HGB 7.1*   HCT 22.2*      MCV 95   MCH 30.2   MCHC 32.0     CMP:   Recent Labs   Lab 12/03/18  0630   *   CALCIUM 8.0*   ALBUMIN 1.4*   PROT 4.7*      K 4.7   CO2 22*      BUN 53*   CREATININE 3.5*   ALKPHOS 261*   ALT 26   AST 17   BILITOT 2.6*     Coagulation:   Recent Labs   Lab 11/29/18  0703  12/03/18  0630   INR 1.2   < > 1.1   APTT 30.9  --   --     < > = values in this interval not displayed.     Tacrolimus Lvl   Date Value Ref Range Status   12/03/2018 12.3 5.0 - 15.0 ng/mL Final     Comment:     Testing performed by Liquid Chromatography-Tandem  Mass Spectrometry (LC-MS/MS).  This test was developed and its performance characteristics  determined by [facility], Department of Pathology  and Laboratory Medicine in a manner consistent with CLIA  requirements. It has not been cleared or approved by the [place]  Food and Drug Administration.  This test is used for clinical   purposes.  It should not be regarded as investigational  or for  research.     12/02/2018 9.4 5.0 - 15.0 ng/mL Final     Comment:     Testing performed by Liquid Chromatography-Tandem  Mass Spectrometry (LC-MS/MS).  This test was developed and its performance characteristics  determined by [facility], Department of Pathology  and Laboratory Medicine in a manner consistent with CLIA  requirements. It has not been cleared or approved by the [place]  Food and Drug Administration.  This test is used for clinical   purposes.  It should not be regarded as investigational or for  research.     12/01/2018 14.4 5.0 - 15.0 ng/mL Final     Comment:     Testing performed by Liquid Chromatography-Tandem  Mass Spectrometry (LC-MS/MS).  This test was developed and its performance characteristics  determined by [facility], Department of Pathology  and Laboratory Medicine in a manner consistent with CLIA  requirements. It has not been cleared or approved by the [place]  Food and Drug Administration.  This test is used for clinical   purposes.  It should not be regarded as investigational or for  research.     11/30/2018 13.8 5.0 - 15.0 ng/mL Final     Comment:     Testing performed by Liquid Chromatography-Tandem  Mass Spectrometry (LC-MS/MS).  This test was developed and its performance characteristics  determined by [facility], Department of Pathology  and Laboratory Medicine in a manner consistent with CLIA  requirements. It has not been cleared or approved by the [place]  Food and Drug Administration.  This test is used for clinical   purposes.  It should not be regarded as investigational or for  research.           Labs within the past 24 hours have been reviewed.    Diagnostic Results:  None

## 2018-12-03 NOTE — PROGRESS NOTES
[facility]  Nephrology  Progress Note    Patient Name: Femi Enciso  MRN: [id number]  Admission Date: 10/27/2018  Hospital Length of Stay: 37 days  Attending Provider: Femi Patel MD   Primary Care Physician: Primary Doctor No  Principal Problem:S/P liver transplant    Subjective:     HPI: 54 y/o man with DM2 presents to the ED with family for liver failure (likely due to EtOH abundant history of drinking - diagnosed in Sept 2018 in [place]).  He reports jaundice, generalized weakness, nausea, diarrhea, and decreased appetite since Sept 2018.  He is from [place], [place], and Dr. Sharma (patients physician) recommended bringing him to hospital for evaluation.  Patient denies any fever, chills, vomiting, chest pain, palpitations, SOB, abdominal pain.      Nephrology consulted for evaluation/management Del.     No new subjective & objective note has been filed under this hospital service since the last note was generated.    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   - s/p OHLTx 11/11/2018; intra-op SLED  - remaining anuric, but clearance numbers look ok, on SLED overnight  -hemodynamically stable, off pressors    -remaining w/o significant UOP  -no HD since Saturday  -come worsening SOB  -doubt worsening MS, from lack of RRT, but will do today for volume and metabolic clearance and if this is a component may help some                 Thank you for your consult. I will follow-up with patient. Please contact us if you have any additional questions.    Petar Hoyos MD  Nephrology  [facility]    ATTENDING PHYSICIAN ATTESTATION  I have personally interviewed and examined the patient. I thoroughly reviewed the demographic,  clinical, laboratorial and imaging information available in medical records. I agree with the assessment and recommendations provided by the subspecialty resident. Dr. Hoyos was under my supervision.

## 2018-12-03 NOTE — PROGRESS NOTES
[facility]  Liver Transplant  Progress Note    Patient Name: Femi Enciso  MRN: [id number]  Admission Date: 10/27/2018  Hospital Length of Stay: 37 days  Code Status: Full Code  Primary Care Provider: Primary Doctor No  Post-Operative Day: 22    ORGAN:   LIVER  Disease Etiology: Alcoholic Cirrhosis  Donor Type:    - Brain Death  CDC High Risk:   No  Donor CMV Status:   Donor CMV Status: Positive  Donor HBcAB:   Negative  Donor HCV Status:   Negative  Donor HBV JAVIER: Negative  Donor HCV JAVIER: Negative  Whole or Partial: Whole Liver  Biliary Anastomosis: End to End  Arterial Anatomy: Standard  Subjective:     History of Present Illness:  Femi Enciso is a 53yr old male with PMH of acute ETOH hepatitis and DEL/ATN evolved to ESRD requiring HD (not candidate for KTX). He is now s/p liver transplant (SM induction, DBD, CMV D+/R-). Transplant surgery noted severe collateralization, adrenal gland firmly adhered to liver. Bare area of adrenal gland required several stitches and packing to obtain fair hemostasis (EBL 15L). OR take back  for bleeding from R adrenal bed in AM (significant clot in retroperitoneum, posterior to R hepatic lobe, tract posterior to the R kidney containing significant clot, small bleeding area of retroperitoneal fat) then returned to surgery same day for hemorrhaging closed with wound vac with plans to return to OR 24-48 hours for closure (retroperitoneal /retrorenal/retrocolic hematoma on the right ). Raw retroperitoneal bleeding. Suture ligatures placed, argon beam cautery, evarest placed in retroperitoneum behind cava and right kidney. Significant oozing from upper right adrenal gland, not amenable to ligation, that portion of the adrenal gland was resected with cautery). OR take back  for washout and incisional closure, no significant bleeding or hematoma found. OR cultures   from body fluid grew enterococcus faecium VRE. ID was consulted,  started on  daptomycin for VRE treatment and cefepime/flagyl to cover for IA ty in bile. Patient with significant leukocytosis with peak on 11/22 at 48K prompting drainage of R subphrenic fluid collection, perc drain placed, culture grew VRE. Stroke code called 11/21 for lethargy and unresponsive, CT head without evidence of acute ischemic changes and CTA chest negative, vascular neurology was consulted recommended MRI brain, but patient's AMS improved shortly after event. Nephrology consulted for ESRD requiring HD. Patient overall improved on antibiotic regimen, leukocytosis and AMS improved. He was transferred to T[facility] on POD #15.     Hospital Course:  Interval History:  Pt cont w confusion since Saturday.  He is alert to place and person, with coaching he was able to recall what surgery he had had.  He is off Oxycodone.  He is sleeping well on Trazodone.  Pain/back spasms managed w PRN Zanaflex.  Taking short breaths, pulse ox 96 % on RA, ABGs- ok, CO2 35.  He had a thora on 11/30 w 1.5L removed, wbc 3969, segs 93.  Cultures pending.  Pt c/o chest tightness.  Area below chevron w slight induration and bruising.  Chevron w staples CDI.  H/H decreased- last transfused 1 u 12/2 w appropriate response.  Hgb decreased again today at 7.1.  LDH and Haptoglobin wnl.  LBM 11/29.  Plan to transfuse 1 u PRBC today and order Chest/Abd/pelvis CT to assess for fluid collection/source of anemia.  Pt was already on Linezolid thru 12/10, Cefepime added following Thora. Re consulted ID for assistance w mgt of abx.  Pt has had no appetite past 48 hours.  Able to eat 25% of tray today.  LFTs cont to improve.  Cr level still elevated and requiring HD intermittently.  Plan for HD at bedside tonight.  Plan for albumin 25% 100 ml x2.  PT/OT following.  He remains significantly debilitated and appears more cachetic. Monitor.       Scheduled Meds:   sodium chloride 0.9%   Intravenous Once    albumin human 25%  25 g Intravenous Q6H     albuterol-ipratropium  3 mL Nebulization Q4H WAKE    ceFEPime (MAXIPIME) IVPB  1 g Intravenous Q12H    ergocalciferol  50,000 Units Oral Q7 Days    famotidine  20 mg Oral QHS    heparin (porcine)  5,000 Units Subcutaneous Q8H    insulin aspart U-100  2 Units Subcutaneous TIDWM    isavuconazonium sulfate  372 mg Oral Daily    lidocaine HCL 10 mg/ml (1%)  1 mL Intradermal Once    linezolid  600 mg Oral Q12H    nystatin  500,000 Units Oral QID (WM & HS)    predniSONE  10 mg Oral Daily    Followed by    [START ON 12/10/2018] predniSONE  5 mg Oral Daily    Followed by    [START ON 12/17/2018] predniSONE  2.5 mg Oral Daily    sodium bicarbonate  650 mg Oral BID    sulfamethoxazole-trimethoprim 400-80mg  1 tablet Oral Every Mon, Wed, Fri    [START ON 12/4/2018] tacrolimus  2 mg Oral BID    traZODone  50 mg Oral QHS    ursodiol  300 mg Oral BID    valganciclovir 50 mg/ml  100 mg Oral Once per day on Mon Wed Fri     Continuous Infusions:  PRN Meds:sodium chloride, sodium chloride 0.9%, sodium chloride 0.9%, albuterol-ipratropium, dextrose 50%, dextrose 50%, glucagon (human recombinant), glucose, glucose, heparin (porcine), insulin aspart U-100, midodrine, ondansetron, tiZANidine, traZODone    Review of Systems   Constitutional: Positive for activity change, appetite change (improving) and fatigue. Negative for fever.   HENT: Negative.  Negative for facial swelling.    Eyes: Negative.    Respiratory: Positive for shortness of breath (w exertion). Negative for apnea, chest tightness and wheezing.    Cardiovascular: Negative.  Negative for chest pain, palpitations and leg swelling.   Gastrointestinal: Positive for abdominal distention and abdominal pain. Negative for constipation, diarrhea, nausea and vomiting.   Genitourinary: Positive for decreased urine volume. Negative for difficulty urinating, dysuria, hematuria and urgency.   Musculoskeletal: Negative.  Negative for back pain, gait problem, neck pain and  neck stiffness.   Skin: Positive for wound. Negative for color change and pallor.   Allergic/Immunologic: Positive for immunocompromised state.   Neurological: Positive for weakness. Negative for dizziness, seizures, light-headedness and headaches.   Psychiatric/Behavioral: Positive for decreased concentration and dysphoric mood. Negative for behavioral problems, confusion, hallucinations, sleep disturbance and suicidal ideas. The patient is not nervous/anxious.      Objective:     Vital Signs (Most Recent):  Temp: 97.8 °F (36.6 °C) (12/03/18 1626)  Pulse: 106 (12/03/18 1626)  Resp: (!) 31 (12/03/18 1626)  BP: 118/80 (12/03/18 1626)  SpO2: 98 % (12/03/18 1626) Vital Signs (24h Range):  Temp:  [95.2 °F (35.1 °C)-98.5 °F (36.9 °C)] 97.8 °F (36.6 °C)  Pulse:  [] 106  Resp:  [20-32] 31  SpO2:  [96 %-99 %] 98 %  BP: (104-124)/(66-80) 118/80     Weight: 75.7 kg (166 lb 14.2 oz)  Body mass index is 23.95 kg/m².    Intake/Output - Last 3 Shifts       12/01 0700 - 12/02 0659 12/02 0700 - 12/03 0659 12/03 0700 - 12/04 0659    P.O. 1160 970 240    Blood  230     Other       Total Intake(mL/kg) 1160 (15.4) 1200 (15.9) 240 (3.2)    Urine (mL/kg/hr) 0 (0) 0 (0)     Emesis/NG output 0 0     Other 0 0     Stool 0 0     Blood 0 0     Total Output 0 0     Net +1160 +1200 +240           Urine Occurrence  0 x     Stool Occurrence  0 x     Emesis Occurrence  0 x           Physical Exam   Constitutional: He is oriented to person, place, and time. He appears well-developed. No distress.   HENT:   Head: Normocephalic and atraumatic.   White drainage noted to back of throat and roof on mouth, voice hoarse   Eyes: Pupils are equal, round, and reactive to light. Scleral icterus is present.   Neck: Normal range of motion. Neck supple. No JVD present.   Cardiovascular: Normal rate, regular rhythm and normal heart sounds.   No murmur heard.  Pulmonary/Chest: Effort normal. No respiratory distress. He has decreased breath sounds in the  right lower field and the left lower field. He has no wheezes. He exhibits no tenderness.   Taking short breaths, IS up to 500   Abdominal: Soft. Bowel sounds are normal. He exhibits no distension. There is tenderness.   Chevron inc ECTOR w/ staples  RLQ JOSE A drain and percutaneous drain site w suture CDI.    Musculoskeletal: Normal range of motion. He exhibits edema. He exhibits no tenderness.   Neurological: He is alert and oriented to person, place, and time. He has normal reflexes.   Skin: Skin is warm and dry. He is not diaphoretic.   Psychiatric: His affect is labile. He is slowed. Cognition and memory are impaired.   Nursing note and vitals reviewed.      Laboratory:  Immunosuppressants         Stop Route Frequency     tacrolimus capsule 2 mg      -- Oral 2 times daily        CBC:   Recent Labs   Lab 12/03/18  0630   WBC 11.64   RBC 2.35*   HGB 7.1*   HCT 22.2*      MCV 95   MCH 30.2   MCHC 32.0     CMP:   Recent Labs   Lab 12/03/18  0630   *   CALCIUM 8.0*   ALBUMIN 1.4*   PROT 4.7*      K 4.7   CO2 22*      BUN 53*   CREATININE 3.5*   ALKPHOS 261*   ALT 26   AST 17   BILITOT 2.6*     Coagulation:   Recent Labs   Lab 11/29/18  0703  12/03/18  0630   INR 1.2   < > 1.1   APTT 30.9  --   --     < > = values in this interval not displayed.     Tacrolimus Lvl   Date Value Ref Range Status   12/03/2018 12.3 5.0 - 15.0 ng/mL Final     Comment:     Testing performed by Liquid Chromatography-Tandem  Mass Spectrometry (LC-MS/MS).  This test was developed and its performance characteristics  determined by [facility], Department of Pathology  and Laboratory Medicine in a manner consistent with CLIA  requirements. It has not been cleared or approved by the [place]  Food and Drug Administration.  This test is used for clinical   purposes.  It should not be regarded as investigational or for  research.     12/02/2018 9.4 5.0 - 15.0 ng/mL Final     Comment:     Testing performed by Liquid  Chromatography-Tandem  Mass Spectrometry (LC-MS/MS).  This test was developed and its performance characteristics  determined by [facility], Department of Pathology  and Laboratory Medicine in a manner consistent with CLIA  requirements. It has not been cleared or approved by the [place]  Food and Drug Administration.  This test is used for clinical   purposes.  It should not be regarded as investigational or for  research.     12/01/2018 14.4 5.0 - 15.0 ng/mL Final     Comment:     Testing performed by Liquid Chromatography-Tandem  Mass Spectrometry (LC-MS/MS).  This test was developed and its performance characteristics  determined by [facility], Department of Pathology  and Laboratory Medicine in a manner consistent with CLIA  requirements. It has not been cleared or approved by the [place]  Food and Drug Administration.  This test is used for clinical   purposes.  It should not be regarded as investigational or for  research.     11/30/2018 13.8 5.0 - 15.0 ng/mL Final     Comment:     Testing performed by Liquid Chromatography-Tandem  Mass Spectrometry (LC-MS/MS).  This test was developed and its performance characteristics  determined by [facility], Department of Pathology  and Laboratory Medicine in a manner consistent with CLIA  requirements. It has not been cleared or approved by the [place]  Food and Drug Administration.  This test is used for clinical   purposes.  It should not be regarded as investigational or for  research.           Labs within the past 24 hours have been reviewed.    Diagnostic Results:  None    Assessment/Plan:     * S/P liver transplant    - 53 year old male with history of ESLD 2/2 ETOH cirrhosis who is s/p liver transplant c/b take-back x 3 for bleeding most recently 11/18.  - LFTs now improving slowly.  T bili was 37.6 day of transplant.  - Pt remains with significant debility. Pt will need SNF placement when medically stable.   - Appetite slowly improving.   Tolerating supplements.  Encourage PO intake.   - Drain placed during take back. Drain placed to intra-abdominal abscess on 11/22.   - Fluid from collection noted with enterococcus VRE. Cont with antibxs per ID.  De escalated to PO on 11/29/18.    - Abdominal pain well controlled, and having BMs.    - HD per nephrology. Plan for HD today at bedside- plan to remove up to 1L.   - Muscle relaxer started and will hold off on oxycodone for now.        Moderate malnutrition    - muscle wasting noted.  Appetite was improving.  Worse over the wkd.  Cont supplements.  Dietician following.  Apprec recs.         Acute renal failure with acute tubular necrosis superimposed on stage 3 chronic kidney disease    - Renal function slow to recover post-op.   - Nephrology consulted for management.   - Cont with HD as per nephrology recs.  Apprec recs.    - HD on 11/27 w/ 2L off. Pt tolerated well.    - Lasix 160 mg challenge 11/28 w/ minimal output.    - HD performed on 11/30.  Plan for HD tonight at bedside.   - Strict I&Os.      Intra-abdominal abscess    - Significant increase in WBC post-op.   - OR cultures from 11/18 noted with enterococcus VRE.   - Abdominal CT performed at that time with fluid collection and drain placed on 11/22 for drainage.   - Intra-abdominal abscess culture also with enterococcus VRE.   - ID consulted and pt placed on antibxs for treatment. Pt was on Cefepime, Daptomycin, and Fluconazole for treatment.    - Pt remains with RLQ drains (one JOSE A and one Percutaneous).  One JOSE A removed 11/28.  Perc drain in placed draining tan, clear drainage.  WBC slowly improving.   - Liver US on 11/26 reviewed.   - Abdominal CT performed 11/27 and reviewed. Fluid collection noted with improvement on CT.  ID following and reassured by findings.    - Dapto, Cefepime and Flagyl changed 11/30/18 to Linezolid 600 mg PO q 12 hr x 10 days per ID.     - added back cefepime after thora.  ID re consulted.     Long-term use of  immunosuppressant medication    - Cont with prograf. Draw prograf level daily and adjust dose as needed to maintain a therapeutic level.        At risk for opportunistic infections    - Cont with bactrim and valcyte for protection against opportunistic infections.   - cont Isavuconazonium.     Leucocytosis    - See intra-abdominal abscess.         Delirium    - Pt with intermittent confusion since transplant was improving slowly.   - Pt with some lethargy and confusion on 11/21. Head CT negative.   - AAOx3. More alert and awake on 11/27.   - AAOx4 on 11/29.  He was happy he could remember what day it is today.    - confusion worse over the wkd.  Seroquel d/c'd 11/30/18.  Trazodone for insomnia ordered w PRN dose.  Sleeping improved.  - Monitor closely.        Prophylactic immunotherapy    - See long term immunosuppression.        Weakness    - Pt remains significantly debilitated.   - PT/OT for strengthening.   - Currently will need SNF for placement and further rehabilitation.        Pleural effusion associated with hepatic disorder    - c/o increased pain w deep breath today to right side.  CXR showed increased opacity in the inferior hemothorax on the right side since 11/26/2018.  Pulse ox 97-99% on RA.  Cont to monitor    - continues to have fluid to RLL.  WBC remains elevated.    - thoracentesis performed on 11/30. Fluid noted with 4k WBC, 93 SEGS. Cefepime added for treatment.  ID consulted to comment on abx.    - will assess w Chest CT     Type 2 diabetes mellitus without complication    - Endocrine consulted for management. Appreciate recs.        Anemia of chronic disease    - H&H low over the wkd, transfused 1u PRBC 12/2 w appropriate response.  H/H decreased again today.  Plan to transfuse 1u PRBC.    - stool for occult blood.  Pt LBM 11/29.  No evidence of acute bleed.  Plan for Chest/Abd/pelvis CT to assess for source of bleeding.        DEL (acute kidney injury)    - DEL for over 2 weeks prior to  transplant. Nephrology was consulted.     - Post transplant, Nephrology cont to follow.  He received HD last on 11/30 for metabolic clearance and fluid management.     - Attempted Lasix challenge (160 mg) x1 on 11/28- pt diuresed 105 cc.    - Plan for HD 12/3- plan to remove 1liter.  Cont strict I/O's.  Monitor closely.     Hepatorenal syndrome    - DEL past 2 weeks.  Pt on Midodrine.  Last HD 11/9/18- 0 fluid removed 2/2 hypotension.  - Nephrology following for HD-  Pt cont to have hypotenstion worse w standing.  Midodrine 10 mg PRN for hypotension during HD ordered.           VTE Risk Mitigation (From admission, onward)        Ordered     heparin (porcine) injection 5,000 Units  Every 8 hours      11/30/18 1149     heparin (porcine) injection 1,000 Units  As needed (PRN)      11/25/18 1428     IP VTE HIGH RISK PATIENT  Once      11/11/18 1208          The patients clinical status was discussed at multidisplinary rounds, involving transplant surgery, transplant medicine, pharmacy, nursing, nutrition, and social work    Discharge Planning:  NO plan for discharge    Hilary Galarza NP  Liver Transplant  [facility]

## 2018-12-03 NOTE — ASSESSMENT & PLAN NOTE
- DEL for over 2 weeks prior to transplant. Nephrology was consulted.     - Post transplant, Nephrology cont to follow.  He received HD last on 11/30 for metabolic clearance and fluid management.     - Attempted Lasix challenge (160 mg) x1 on 11/28- pt diuresed 105 cc.    - Plan for HD 12/3- plan to remove 1liter.  Cont strict I/O's.  Monitor closely.

## 2018-12-03 NOTE — ASSESSMENT & PLAN NOTE
- muscle wasting noted.  Appetite was improving.  Worse over the wkd.  Cont supplements.  Dietician following.  Kareen galicia.

## 2018-12-03 NOTE — ASSESSMENT & PLAN NOTE
- Pt with intermittent confusion since transplant was improving slowly.   - Pt with some lethargy and confusion on 11/21. Head CT negative.   - AAOx3. More alert and awake on 11/27.   - AAOx4 on 11/29.  He was happy he could remember what day it is today.    - confusion worse over the wkd.  Seroquel d/c'd 11/30/18.  Trazodone for insomnia ordered w PRN dose.  Sleeping improved.  - Monitor closely.

## 2018-12-03 NOTE — ASSESSMENT & PLAN NOTE
Avoid insulin stacking and hypoglycemia.  Nephrology following  Lab Results   Component Value Date    CREATININE 3.5 (H) 12/03/2018

## 2018-12-04 LAB
ALBUMIN SERPL BCP-MCNC: 2.2 G/DL
ALP SERPL-CCNC: 273 U/L
ALT SERPL W/O P-5'-P-CCNC: 21 U/L
AMORPH CRY UR QL COMP ASSIST: ABNORMAL
ANION GAP SERPL CALC-SCNC: 10 MMOL/L
AST SERPL-CCNC: 17 U/L
BACTERIA #/AREA URNS AUTO: ABNORMAL /HPF
BASOPHILS # BLD AUTO: 0.16 K/UL
BASOPHILS NFR BLD: 1.5 %
BILIRUB SERPL-MCNC: 2.7 MG/DL
BILIRUB UR QL STRIP: ABNORMAL
BUN SERPL-MCNC: 30 MG/DL
CALCIUM SERPL-MCNC: 8.8 MG/DL
CHLORIDE SERPL-SCNC: 107 MMOL/L
CLARITY UR REFRACT.AUTO: ABNORMAL
CO2 SERPL-SCNC: 23 MMOL/L
COLOR UR AUTO: ABNORMAL
CREAT SERPL-MCNC: 2.4 MG/DL
DIFFERENTIAL METHOD: ABNORMAL
EOSINOPHIL # BLD AUTO: 0.2 K/UL
EOSINOPHIL NFR BLD: 1.6 %
ERYTHROCYTE [DISTWIDTH] IN BLOOD BY AUTOMATED COUNT: 18.6 %
EST. GFR  (AFRICAN AMERICAN): 34.3 ML/MIN/1.73 M^2
EST. GFR  (NON AFRICAN AMERICAN): 29.7 ML/MIN/1.73 M^2
GLUCOSE SERPL-MCNC: 167 MG/DL
GLUCOSE UR QL STRIP: NEGATIVE
HCT VFR BLD AUTO: 26.4 %
HGB BLD-MCNC: 8.2 G/DL
HGB UR QL STRIP: ABNORMAL
HYALINE CASTS UR QL AUTO: 23 /LPF
IMM GRANULOCYTES # BLD AUTO: 0.09 K/UL
IMM GRANULOCYTES NFR BLD AUTO: 0.8 %
INR PPP: 1.1
KETONES UR QL STRIP: NEGATIVE
LEUKOCYTE ESTERASE UR QL STRIP: ABNORMAL
LYMPHOCYTES # BLD AUTO: 0.5 K/UL
LYMPHOCYTES NFR BLD: 4.6 %
MAGNESIUM SERPL-MCNC: 2 MG/DL
MCH RBC QN AUTO: 30 PG
MCHC RBC AUTO-ENTMCNC: 31.1 G/DL
MCV RBC AUTO: 97 FL
MICROSCOPIC COMMENT: ABNORMAL
MONOCYTES # BLD AUTO: 0.5 K/UL
MONOCYTES NFR BLD: 4.8 %
NEUTROPHILS # BLD AUTO: 9.5 K/UL
NEUTROPHILS NFR BLD: 86.7 %
NITRITE UR QL STRIP: NEGATIVE
NRBC BLD-RTO: 0 /100 WBC
PH UR STRIP: 5 [PH] (ref 5–8)
PHOSPHATE SERPL-MCNC: 3.1 MG/DL
PLATELET # BLD AUTO: 200 K/UL
PMV BLD AUTO: 12.1 FL
POCT GLUCOSE: 152 MG/DL (ref 70–110)
POCT GLUCOSE: 171 MG/DL (ref 70–110)
POCT GLUCOSE: 180 MG/DL (ref 70–110)
POCT GLUCOSE: 213 MG/DL (ref 70–110)
POTASSIUM SERPL-SCNC: 3.9 MMOL/L
PROT SERPL-MCNC: 5.4 G/DL
PROT UR QL STRIP: ABNORMAL
PROTHROMBIN TIME: 11.6 SEC
RBC # BLD AUTO: 2.73 M/UL
RBC #/AREA URNS AUTO: 18 /HPF (ref 0–4)
SODIUM SERPL-SCNC: 140 MMOL/L
SP GR UR STRIP: >=1.03 (ref 1–1.03)
SQUAMOUS #/AREA URNS AUTO: 2 /HPF
TACROLIMUS BLD-MCNC: 14.1 NG/ML
URN SPEC COLLECT METH UR: ABNORMAL
WBC # BLD AUTO: 11 K/UL
WBC #/AREA URNS AUTO: 19 /HPF (ref 0–5)
YEAST UR QL AUTO: ABNORMAL

## 2018-12-04 PROCEDURE — 80197 ASSAY OF TACROLIMUS: CPT

## 2018-12-04 PROCEDURE — 63600175 PHARM REV CODE 636 W HCPCS: Performed by: STUDENT IN AN ORGANIZED HEALTH CARE EDUCATION/TRAINING PROGRAM

## 2018-12-04 PROCEDURE — 25000003 PHARM REV CODE 250: Performed by: STUDENT IN AN ORGANIZED HEALTH CARE EDUCATION/TRAINING PROGRAM

## 2018-12-04 PROCEDURE — 87086 URINE CULTURE/COLONY COUNT: CPT

## 2018-12-04 PROCEDURE — 94761 N-INVAS EAR/PLS OXIMETRY MLT: CPT

## 2018-12-04 PROCEDURE — 85025 COMPLETE CBC W/AUTO DIFF WBC: CPT

## 2018-12-04 PROCEDURE — 97530 THERAPEUTIC ACTIVITIES: CPT

## 2018-12-04 PROCEDURE — 99233 SBSQ HOSP IP/OBS HIGH 50: CPT | Mod: 24,,, | Performed by: NURSE PRACTITIONER

## 2018-12-04 PROCEDURE — 85610 PROTHROMBIN TIME: CPT

## 2018-12-04 PROCEDURE — 25000003 PHARM REV CODE 250: Performed by: NURSE PRACTITIONER

## 2018-12-04 PROCEDURE — 87040 BLOOD CULTURE FOR BACTERIA: CPT | Mod: 59

## 2018-12-04 PROCEDURE — 94640 AIRWAY INHALATION TREATMENT: CPT

## 2018-12-04 PROCEDURE — 99232 PR SUBSEQUENT HOSPITAL CARE,LEVL II: ICD-10-PCS | Mod: ,,, | Performed by: NURSE PRACTITIONER

## 2018-12-04 PROCEDURE — P9047 ALBUMIN (HUMAN), 25%, 50ML: HCPCS | Mod: JG | Performed by: NURSE PRACTITIONER

## 2018-12-04 PROCEDURE — 20600001 HC STEP DOWN PRIVATE ROOM

## 2018-12-04 PROCEDURE — 63600175 PHARM REV CODE 636 W HCPCS: Performed by: NURSE PRACTITIONER

## 2018-12-04 PROCEDURE — 25000242 PHARM REV CODE 250 ALT 637 W/ HCPCS: Performed by: NURSE PRACTITIONER

## 2018-12-04 PROCEDURE — 84100 ASSAY OF PHOSPHORUS: CPT

## 2018-12-04 PROCEDURE — 99233 PR SUBSEQUENT HOSPITAL CARE,LEVL III: ICD-10-PCS | Mod: ,,, | Performed by: INTERNAL MEDICINE

## 2018-12-04 PROCEDURE — 99233 SBSQ HOSP IP/OBS HIGH 50: CPT | Mod: ,,, | Performed by: INTERNAL MEDICINE

## 2018-12-04 PROCEDURE — 81001 URINALYSIS AUTO W/SCOPE: CPT

## 2018-12-04 PROCEDURE — 80053 COMPREHEN METABOLIC PANEL: CPT

## 2018-12-04 PROCEDURE — 99232 SBSQ HOSP IP/OBS MODERATE 35: CPT | Mod: ,,, | Performed by: NURSE PRACTITIONER

## 2018-12-04 PROCEDURE — 36415 COLL VENOUS BLD VENIPUNCTURE: CPT

## 2018-12-04 PROCEDURE — 25500020 PHARM REV CODE 255: Performed by: RADIOLOGY

## 2018-12-04 PROCEDURE — 94664 DEMO&/EVAL PT USE INHALER: CPT

## 2018-12-04 PROCEDURE — 83735 ASSAY OF MAGNESIUM: CPT

## 2018-12-04 PROCEDURE — 97535 SELF CARE MNGMENT TRAINING: CPT

## 2018-12-04 PROCEDURE — 99900035 HC TECH TIME PER 15 MIN (STAT)

## 2018-12-04 PROCEDURE — 99233 PR SUBSEQUENT HOSPITAL CARE,LEVL III: ICD-10-PCS | Mod: 24,,, | Performed by: NURSE PRACTITIONER

## 2018-12-04 RX ORDER — CEFEPIME HYDROCHLORIDE 1 G/1
1 INJECTION, POWDER, FOR SOLUTION INTRAMUSCULAR; INTRAVENOUS
Status: DISCONTINUED | OUTPATIENT
Start: 2018-12-05 | End: 2018-12-05

## 2018-12-04 RX ORDER — INSULIN ASPART 100 [IU]/ML
3 INJECTION, SOLUTION INTRAVENOUS; SUBCUTANEOUS
Status: DISCONTINUED | OUTPATIENT
Start: 2018-12-04 | End: 2018-12-07

## 2018-12-04 RX ORDER — FUROSEMIDE 10 MG/ML
40 INJECTION INTRAMUSCULAR; INTRAVENOUS ONCE
Status: COMPLETED | OUTPATIENT
Start: 2018-12-04 | End: 2018-12-04

## 2018-12-04 RX ORDER — BISACODYL 10 MG
10 SUPPOSITORY, RECTAL RECTAL DAILY PRN
Status: DISCONTINUED | OUTPATIENT
Start: 2018-12-04 | End: 2019-01-08 | Stop reason: HOSPADM

## 2018-12-04 RX ADMIN — HEPARIN SODIUM 5000 UNITS: 5000 INJECTION, SOLUTION INTRAVENOUS; SUBCUTANEOUS at 05:12

## 2018-12-04 RX ADMIN — CEFEPIME 1 G: 1 INJECTION, POWDER, FOR SOLUTION INTRAMUSCULAR; INTRAVENOUS at 02:12

## 2018-12-04 RX ADMIN — IPRATROPIUM BROMIDE AND ALBUTEROL SULFATE 3 ML: .5; 3 SOLUTION RESPIRATORY (INHALATION) at 08:12

## 2018-12-04 RX ADMIN — INSULIN ASPART 2 UNITS: 100 INJECTION, SOLUTION INTRAVENOUS; SUBCUTANEOUS at 11:12

## 2018-12-04 RX ADMIN — SODIUM BICARBONATE 650 MG TABLET 650 MG: at 05:12

## 2018-12-04 RX ADMIN — LINEZOLID 600 MG: 600 TABLET, FILM COATED ORAL at 08:12

## 2018-12-04 RX ADMIN — NYSTATIN 500000 UNITS: 500000 SUSPENSION ORAL at 09:12

## 2018-12-04 RX ADMIN — HEPARIN SODIUM 5000 UNITS: 5000 INJECTION, SOLUTION INTRAVENOUS; SUBCUTANEOUS at 09:12

## 2018-12-04 RX ADMIN — NYSTATIN 500000 UNITS: 500000 SUSPENSION ORAL at 05:12

## 2018-12-04 RX ADMIN — IPRATROPIUM BROMIDE AND ALBUTEROL SULFATE 3 ML: .5; 3 SOLUTION RESPIRATORY (INHALATION) at 03:12

## 2018-12-04 RX ADMIN — INSULIN ASPART 3 UNITS: 100 INJECTION, SOLUTION INTRAVENOUS; SUBCUTANEOUS at 05:12

## 2018-12-04 RX ADMIN — URSODIOL 300 MG: 300 CAPSULE ORAL at 09:12

## 2018-12-04 RX ADMIN — LINEZOLID 600 MG: 600 TABLET, FILM COATED ORAL at 09:12

## 2018-12-04 RX ADMIN — FAMOTIDINE 20 MG: 20 TABLET ORAL at 09:12

## 2018-12-04 RX ADMIN — IPRATROPIUM BROMIDE AND ALBUTEROL SULFATE 3 ML: .5; 3 SOLUTION RESPIRATORY (INHALATION) at 12:12

## 2018-12-04 RX ADMIN — INSULIN ASPART 2 UNITS: 100 INJECTION, SOLUTION INTRAVENOUS; SUBCUTANEOUS at 08:12

## 2018-12-04 RX ADMIN — URSODIOL 300 MG: 300 CAPSULE ORAL at 08:12

## 2018-12-04 RX ADMIN — PREDNISONE 10 MG: 5 TABLET ORAL at 08:12

## 2018-12-04 RX ADMIN — NYSTATIN 500000 UNITS: 500000 SUSPENSION ORAL at 08:12

## 2018-12-04 RX ADMIN — IPRATROPIUM BROMIDE AND ALBUTEROL SULFATE 3 ML: .5; 3 SOLUTION RESPIRATORY (INHALATION) at 07:12

## 2018-12-04 RX ADMIN — ISAVUCONAZONIUM SULFATE 372 MG: 186 CAPSULE ORAL at 08:12

## 2018-12-04 RX ADMIN — IOHEXOL 15 ML: 350 INJECTION, SOLUTION INTRAVENOUS at 05:12

## 2018-12-04 RX ADMIN — HEPARIN SODIUM 5000 UNITS: 5000 INJECTION, SOLUTION INTRAVENOUS; SUBCUTANEOUS at 01:12

## 2018-12-04 RX ADMIN — NYSTATIN 500000 UNITS: 500000 SUSPENSION ORAL at 11:12

## 2018-12-04 RX ADMIN — FUROSEMIDE 40 MG: 10 INJECTION, SOLUTION INTRAMUSCULAR; INTRAVENOUS at 01:12

## 2018-12-04 RX ADMIN — IOHEXOL 15 ML: 350 INJECTION, SOLUTION INTRAVENOUS at 04:12

## 2018-12-04 RX ADMIN — TRAZODONE HYDROCHLORIDE 50 MG: 50 TABLET ORAL at 09:12

## 2018-12-04 RX ADMIN — ALBUMIN HUMAN 25 G: 0.25 SOLUTION INTRAVENOUS at 05:12

## 2018-12-04 RX ADMIN — INSULIN ASPART 3 UNITS: 100 INJECTION, SOLUTION INTRAVENOUS; SUBCUTANEOUS at 11:12

## 2018-12-04 RX ADMIN — TIZANIDINE 2 MG: 2 TABLET ORAL at 09:12

## 2018-12-04 RX ADMIN — SODIUM BICARBONATE 650 MG TABLET 650 MG: at 08:12

## 2018-12-04 RX ADMIN — TACROLIMUS 2 MG: 1 CAPSULE ORAL at 08:12

## 2018-12-04 NOTE — ASSESSMENT & PLAN NOTE
Managed per LTS.   avoid hypoglycemia  Optimize BG control for surgical wound healing.     Lab Results   Component Value Date    ALT 21 12/04/2018    AST 17 12/04/2018     (H) 11/26/2018    ALKPHOS 273 (H) 12/04/2018    BILITOT 2.7 (H) 12/04/2018

## 2018-12-04 NOTE — PROGRESS NOTES
I called CT and they said to start the first contrast at 5am and call for transport at 7am when pt is done taking all the contrast.

## 2018-12-04 NOTE — PT/OT/SLP PROGRESS
"Physical Therapy Treatment    Patient Name:  Femi Enciso   MRN:  [id number]    Recommendations:     Discharge Recommendations:  nursing facility, skilled   Discharge Equipment Recommendations: (TBD)   Barriers to discharge: Decreased caregiver support at current functional level    Assessment:     Femi Enciso is a 53 y.o. male admitted with a medical diagnosis of S/P liver transplant.  He presents with the following impairments/functional limitations:  weakness, gait instability, impaired endurance, impaired balance, decreased safety awareness, impaired self care skills, impaired functional mobilty, impaired cardiopulmonary response to activity, impaired cognition. Pt appears weaker today than previous sessions and had increased difficulty completing functional mobility. Attempted multiple standing trials, but only able to achieve full upright x1 rep. Easily distracted this date and having difficulty following commands. Pt would continue to benefit from skilled acute PT in order to address current deficits and progress functional mobility.     Rehab Prognosis:  good; patient would benefit from acute skilled PT services to address these deficits and reach maximum level of function.      Recent Surgery: Procedure(s) (LRB):  LAPAROTOMY, EXPLORATORY, AFTER LIVER TRANSPLANT, LIKELY  ABDOMINAL CLOSURE (N/A) 16 Days Post-Op    Plan:     During this hospitalization, patient to be seen 4 x/week to address the above listed problems via gait training, therapeutic activities, therapeutic exercises, neuromuscular re-education  · Plan of Care Expires:  12/19/18   Plan of Care Reviewed with: patient    Subjective     Communicated with RN prior to session.  Patient found seated EOB upon PT entry to room, agreeable to treatment.      Chief Complaint: fatigue, back pain, and chest pain   Patient comments/goals: "I think that's a pill over there." re: pt pointing to small white piece of paper on ground   Pain/Comfort:  · Pain Rating 1: " (reported back pain and chest pain with mobility; did not rate)  · Pain Addressed 1: Reposition, Distraction    Patients cultural, spiritual, Spiritism conflicts given the current situation: none noted     Objective:     Patient found with: telemetry, pulse ox (continuous)     General Precautions: Standard, fall, contact   Orthopedic Precautions:N/A   Braces: N/A     Functional Mobility:  · Transfers:     · Sit to Stand:  maximal assistance and of 2 persons with no AD  · x3 attempts with pt able to achieve squat-stand, but not full upright  · x1 attempt with pt able to achieve full stand  · Max v/c and t/c for hip extension and knee extension with B knees blocked throughout   · Bed to Chair: maximal assistance and of 2 persons with  no AD  using  Stand Pivot  · Decreased ability to weight-shift appropriately or advance BLE      AM-PAC 6 CLICK MOBILITY  Turning over in bed (including adjusting bedclothes, sheets and blankets)?: 2  Sitting down on and standing up from a chair with arms (e.g., wheelchair, bedside commode, etc.): 2  Moving from lying on back to sitting on the side of the bed?: 2  Moving to and from a bed to a chair (including a wheelchair)?: 2  Need to walk in hospital room?: 1  Climbing 3-5 steps with a railing?: 1  Basic Mobility Total Score: 10       Therapeutic Activities and Exercises:   Performed functional mobility as described above. Required intermittent rest breaks for recovery with increased WOB noted.   RN notified of pt status following session, including apparent functional decline compared to previous sessions    Patient left up in chair with all lines intact, call button in reach and RN notified..    GOALS:   Multidisciplinary Problems     Physical Therapy Goals        Problem: Physical Therapy Goal    Goal Priority Disciplines Outcome Goal Variances Interventions   Physical Therapy Goal     PT, PT/OT Ongoing (interventions implemented as appropriate)     Description:  Goals to be met  by: 2018     Patient will increase functional independence with mobility by performin. Supine to sit with Modified [place] -not met  2. Sit to stand transfer with [place] -not met  3. Bed to chair transfer with independence using no AD -not met  4. Gait  x150 feet with Supervision using LRAD. -not met  5. Lower extremity exercise program x20 reps per handout, with independence -not met                Problem: Physical Therapy Goal    Goal Priority Disciplines Outcome Goal Variances Interventions   Physical Therapy Goal     PT, PT/OT                      Time Tracking:     PT Received On: 18  PT Start Time: 1022     PT Stop Time: 1045  PT Total Time (min): 23 min     Billable Minutes: Therapeutic Activity 23   (co-tx with OT)    Treatment Type: Treatment  PT/PTA: PT     PTA Visit Number: 0     Ramandeep Kumar, PT, DPT   2018

## 2018-12-04 NOTE — PROGRESS NOTES
"[facility]  Endocrinology  Progress Note    Admit Date: 10/27/2018     Reason for Consult: Management of Hyperglycemia and type 2 DM    Surgical Procedure and Date: liver transplant 18; exploratory lap and liver biopsy on 18      Diabetes diagnosis year: ~ 3-4 years ago     Home Diabetes Medications:  none; previously on metformin 1000 mg BID    How often checking glucose at home? Not checking  BG readings on regimen: n/a  Hypoglycemia on the regimen?  n/a  Missed doses on regimen? n/a    Diabetes Complications include:     DORIAN    Complicating diabetes co morbidities:   CIRRHOSIS and Glucocorticoid use       HPI:   Patient is a 53 y.o. male with a diagnosis of ESLD secondary to ETOH abuse and DEL with ESRD with RRT. Patient is now s/p liver transplant. Endocrinology consulted for BG management.       Lab Results   Component Value Date    HGBA1C 4.4 2018             Interval HPI:   Overnight events: remains in TSU. NAEON. BG above goal with scheduled insulin and correction scale coverage. Prednisone 10 mg daily.   Eatin% or less   Nausea: No  Hypoglycemia and intervention: No  Fever: No  TPN and/or TF: No    /86   Pulse 110   Temp 98.1 °F (36.7 °C) (Oral)   Resp 18   Ht 5' 10" (1.778 m)   Wt 79 kg (174 lb 2.6 oz)   SpO2 98%   BMI 24.99 kg/m²      Labs Reviewed and Include    Recent Labs   Lab 18  0702   *   CALCIUM 8.8   ALBUMIN 2.2*   PROT 5.4*      K 3.9   CO2 23      BUN 30*   CREATININE 2.4*   ALKPHOS 273*   ALT 21   AST 17   BILITOT 2.7*     Lab Results   Component Value Date    WBC 11.00 2018    HGB 8.2 (L) 2018    HCT 26.4 (L) 2018    MCV 97 2018     2018     No results for input(s): TSH, FREET4 in the last 168 hours.  Lab Results   Component Value Date    HGBA1C 4.4 2018       Nutritional status:   Body mass index is 24.99 kg/m².  Lab Results   Component Value Date    ALBUMIN 2.2 (L) " 12/04/2018    ALBUMIN 1.4 (L) 12/03/2018    ALBUMIN 1.4 (L) 12/02/2018     Lab Results   Component Value Date    PREALBUMIN 7 (L) 10/27/2018       Estimated Creatinine Clearance: 36.8 mL/min (A) (based on SCr of 2.4 mg/dL (H)).    Accu-Checks  Recent Labs     12/01/18  2149 12/02/18  0822 12/02/18  1209 12/02/18  1701 12/02/18  2105 12/03/18  0758 12/03/18  1159 12/03/18  1612 12/03/18  2157 12/04/18  0800   POCTGLUCOSE 187* 214* 197* 207* 171* 248* 240* 224* 182* 180*       Current Medications and/or Treatments Impacting Glycemic Control  Immunotherapy:    Immunosuppressants         Stop Route Frequency     tacrolimus capsule 2 mg      -- Oral 2 times daily        Steroids:   Hormones (From admission, onward)    Start     Stop Route Frequency Ordered    12/17/18 0900  predniSONE tablet 2.5 mg      12/24 0859 Oral Daily 11/26/18 1115    12/10/18 0900  predniSONE tablet 5 mg      12/17 0859 Oral Daily 11/26/18 1115    12/03/18 0900  predniSONE tablet 10 mg      12/10 0859 Oral Daily 11/26/18 1115    11/09/18 1345  methylPREDNISolone sodium succinate injection 500 mg  (Med - Immunosuppression Induction Therapy (Methylprednisolone))      11/10 0144 IV Once pre-op 11/09/18 1236        Pressors:    Autonomic Drugs (From admission, onward)    Start     Stop Route Frequency Ordered    11/30/18 1058  midodrine tablet 10 mg     Question:  Is the patient competent?  Answer:  Yes    -- Oral 3 times daily PRN 11/30/18 0958        Hyperglycemia/Diabetes Medications:   Antihyperglycemics (From admission, onward)    Start     Stop Route Frequency Ordered    12/01/18 1215  insulin aspart U-100 pen 2 Units      -- SubQ 3 times daily with meals 12/01/18 1102    11/27/18 1122  insulin aspart U-100 pen 0-5 Units      -- SubQ Before meals & nightly PRN 11/27/18 1023          ASSESSMENT and PLAN    * S/P liver transplant    Managed per LTS.   avoid hypoglycemia  Optimize BG control for surgical wound healing.     Lab Results   Component  Value Date    ALT 21 12/04/2018    AST 17 12/04/2018     (H) 11/26/2018    ALKPHOS 273 (H) 12/04/2018    BILITOT 2.7 (H) 12/04/2018            Type 2 diabetes mellitus without complication    BG goal 140-180    Increase Novolog 3 units with meals  Low dose correction scale given kidney function  BG monitoring AC/HS    Discharge plans- TBD     Adrenal cortical steroids causing adverse effect in therapeutic use    On standard steroid taper per transplant team; may elevate BG readings         DEL (acute kidney injury)    Avoid insulin stacking and hypoglycemia.  Nephrology following  Lab Results   Component Value Date    CREATININE 2.4 (H) 12/04/2018            Prophylactic immunotherapy    May increase insulin resistance.            Tessa Falk NP  Endocrinology  [facility]

## 2018-12-04 NOTE — ASSESSMENT & PLAN NOTE
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   - s/p OHLTx 11/11/2018; intra-op SLED  - remaining anuric, but clearance numbers look ok, on SLED overnight  -hemodynamically stable, off pressors    -about 100ccs of UOP last night, but nothing since  -s/p HD yesterday, clearance numbers and volume appears ok from a renal perspective, noted ascites may be increasing and this would not be expected to removed on HD  -still with a confused mental status, not from kidneys/uremia    -closely follow strict Is & Os and serial RFPs    -no RRT today, reassess need tomorrow    -w/u cause for confused mental status, not likely to be related to kidneys

## 2018-12-04 NOTE — PROGRESS NOTES
Hemodialysis x 3 hours complete. 1.5 liter net fluid removal. Blood returned without difficulty. Each port of cvc flushed with normal saline and capped.

## 2018-12-04 NOTE — PROGRESS NOTES
[facility]  Nephrology  Progress Note    Patient Name: Femi Enciso  MRN: [id number]  Admission Date: 10/27/2018  Hospital Length of Stay: 38 days  Attending Provider: Femi Patel MD   Primary Care Physician: Primary Doctor No  Principal Problem:S/P liver transplant    Subjective:     HPI: 54 y/o man with DM2 presents to the ED with family for liver failure (likely due to EtOH abundant history of drinking - diagnosed in Sept 2018 in [place]).  He reports jaundice, generalized weakness, nausea, diarrhea, and decreased appetite since Sept 2018.  He is from [place], [place], and Dr. Sharma (patients physician) recommended bringing him to hospital for evaluation.  Patient denies any fever, chills, vomiting, chest pain, palpitations, SOB, abdominal pain.      Nephrology consulted for evaluation/management Adri.     Interval History: HD yesterday at bedside, tolerated w/o issues, unfortunately has not done much to clear up his mental status    Review of patient's allergies indicates:   Allergen Reactions    Penicillins Nausea And Vomiting and Rash     Full body rash from cefepime as well     Current Facility-Administered Medications   Medication Frequency    0.9%  NaCl infusion (for blood administration) Q24H PRN    0.9%  NaCl infusion PRN    0.9%  NaCl infusion PRN    0.9%  NaCl infusion Once    albuterol-ipratropium 2.5 mg-0.5 mg/3 mL nebulizer solution 3 mL Q4H PRN    albuterol-ipratropium 2.5 mg-0.5 mg/3 mL nebulizer solution 3 mL Q4H WAKE    bisacodyl suppository 10 mg Daily PRN    ceFEPIme injection 1 g Q12H    dextrose 50% injection 12.5 g PRN    dextrose 50% injection 25 g PRN    ergocalciferol capsule 50,000 Units Q7 Days    famotidine tablet 20 mg QHS    glucagon (human recombinant) injection 1 mg PRN    glucose chewable tablet 16 g PRN    glucose chewable tablet 24 g PRN    heparin (porcine) injection 1,000 Units PRN    heparin (porcine) injection 5,000 Units Q8H    insulin aspart  U-100 pen 0-5 Units QID (AC + HS) PRN    insulin aspart U-100 pen 2 Units TIDWM    isavuconazonium sulfate Cap 372 mg Daily    lidocaine HCL 10 mg/ml (1%) injection 1 mL Once    linezolid tablet 600 mg Q12H    midodrine tablet 10 mg TID PRN    nystatin 100,000 unit/mL suspension 500,000 Units QID (WM & HS)    ondansetron injection 4 mg Q6H PRN    predniSONE tablet 10 mg Daily    Followed by    [START ON 12/10/2018] predniSONE tablet 5 mg Daily    Followed by    [START ON 12/17/2018] predniSONE tablet 2.5 mg Daily    sodium bicarbonate tablet 650 mg BID    sulfamethoxazole-trimethoprim 400-80mg per tablet 1 tablet Every Mon, Wed, Fri    tacrolimus capsule 2 mg BID    tiZANidine tablet 2 mg Q8H PRN    trazodone split tablet 25 mg Nightly PRN    traZODone tablet 50 mg QHS    ursodiol capsule 300 mg BID    valganciclovir 50 mg/ml oral solution 100 mg Once per day on Mon Wed Fri       Objective:     Vital Signs (Most Recent):  Temp: 98.1 °F (36.7 °C) (12/04/18 0757)  Pulse: 110 (12/04/18 0757)  Resp: 18 (12/04/18 0757)  BP: 121/86 (12/04/18 0757)  SpO2: 98 % (12/04/18 0757)  O2 Device (Oxygen Therapy): room air (12/04/18 0757) Vital Signs (24h Range):  Temp:  [97.3 °F (36.3 °C)-98.5 °F (36.9 °C)] 98.1 °F (36.7 °C)  Pulse:  [] 110  Resp:  [18-32] 18  SpO2:  [97 %-99 %] 98 %  BP: (107-121)/(67-86) 121/86     Weight: 79 kg (174 lb 2.6 oz) (12/04/18 0600)  Body mass index is 24.99 kg/m².  Body surface area is 1.98 meters squared.    I/O last 3 completed shifts:  In: 2732.5 [P.O.:1970; Blood:262.5; Other:500]  Out: 2150 [Urine:150; Other:2000]    Physical Exam   Constitutional: He appears well-developed.   Neck: No JVD present.   Cardiovascular: Normal rate and regular rhythm. Exam reveals no friction rub.   Pulmonary/Chest: Effort normal and breath sounds normal.   Abdominal: Soft. He exhibits distension.   Musculoskeletal: He exhibits no edema.   Neurological: He is alert.   Skin: Skin is warm.            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   - s/p OHLTx 11/11/2018; intra-op SLED  - remaining anuric, but clearance numbers look ok, on SLED overnight  -hemodynamically stable, off pressors    -about 100ccs of UOP last night, but nothing since  -s/p HD yesterday, clearance numbers and volume appears ok from a renal perspective, noted ascites may be increasing and this would not be expected to removed on HD  -still with a confused mental status, not from kidneys/uremia    -closely follow strict Is & Os and serial RFPs    -no RRT today, reassess need tomorrow    -w/u cause for confused mental status, not likely to be related to kidneys                 Thank you for your consult. I will follow-up with patient. Please contact us if you have any additional questions.    Petar Hoyos MD  Nephrology  [facility]    ATTENDING PHYSICIAN ATTESTATION  I have personally interviewed and examined the patient. I thoroughly reviewed the demographic, clinical, laboratorial and imaging information available in medical records. I agree with the assessment and recommendations provided by the subspecialty resident. Dr. Hoyos was under my supervision.

## 2018-12-04 NOTE — PLAN OF CARE
"Problem: Patient Care Overview  Goal: Plan of Care Review  Dx: Liver transplant (11/11)  hx: ESLD, ETOH abuse; Severe depression.    11/12: liver transplant; extubated. Products given in OR and HD in OR. CRRT nocturnal.  11/13: CRRT nocturnal held; lines removed. FFP x1 given. Pulled out 2 JOSE A drains.   11/14: 3 PRBC's given for hgb 6; liver US shows potential hematoma. Trend CBC. Extreme agitation/attempted to "kill self" by holding breath for as long as possible multiple times. IV ativan/haldol given. Nocturnal CRRT restarted  Potential washout  To be determined.    11/16: OR for exlap and washout--1U PRBCs and 1 plts; sent back to OR--3 U PRBCs, 1FFP, 1cryo; abd open and wound vac placed  11/17: CT chest, abd, pelvis, levo off 2 units PRBCs, 2 units platelets, 1 unit FFP   11/18: OR for closure, extubated   11/19: clear liquid diet   11/20: TPN/Lipids d/c'd, renal diet, transfer orders  11/21: Head and chest CT  11/22: Pt to IR for drain placement  11/23: HD trial  11/25: HD 2 Liters removed  Nursing:   -160          Outcome: Ongoing (interventions implemented as appropriate)  Pt has mother at bedside. Pt has call bell in reach, non slip socks on, and bedrails up x2. Pt has labs in am. Pt working with PT/OT during day. Pt encouraged to wash hands. Pt on HD at bedside. Pt has accuchecks ac/hs.      "

## 2018-12-04 NOTE — PROGRESS NOTES
I called CT to get a time for the exam and when to start the contrast. They said they are backed up and will call me with a time to start giving pt the contrast.

## 2018-12-04 NOTE — CONSULTS
Consult received. Full note to follow.    Please call for questions.    Thanks,  Solitario Reed MD  Infectious Disease Fellow, PGY-5  Pager: [phone] or ext: [phone]  [facility]

## 2018-12-04 NOTE — PT/OT/SLP PROGRESS
Occupational Therapy   Treatment    Name: Femi Enciso  MRN: [id number]  Admitting Diagnosis:  S/P liver transplant  16 Days Post-Op    Recommendations:     Discharge Recommendations: nursing facility, skilled  Discharge Equipment Recommendations:  (TBD)  Barriers to discharge:  None    Subjective     Pain/Comfort:  · Pain Rating 1: (reported back and CP but no number given)    Objective:     Communicated with: nsg and Pt prior to session.  Patient found with wife and mom present and telemetry, pulse ox (continuous) upon OT entry to room.    General Precautions: Standard, fall, contact   Orthopedic Precautions:N/A   Braces:       Occupational Performance:    Bed Mobility:    · Sitting up EOB on arrival     Functional Mobility/Transfers:  · Sit to stand from bed x 2: one time u/a to achieve full stand, 2nd attempt full stand and stand pivot to bedside chair- max assist x 2  · Functional Mobility: attempted x 3 reps from chair, u/a to come to full stand but able to clear bottom all 3 attempts- max assist x 2    Activities of Daily Living:  · UE dress with min assist with gown  · Brushed teeth/grooming seated in chair with setup assist  · Socks with total assist      AMPAC 6 Click ADL: 15    Treatment & Education:  -Pt edu on OT role/POC  -Importance of OOB activity with staff assistance  -Safety during functional t/f and mobility   - White board updated  - Multiple self care tasks completed-- assistance level noted above  - All questions/concerns answered within OT scope of practice             Patient left up in chair with LE's elevated, tray table in front, call bell in reach, nsg notified  Education:    Assessment:     Femi Enciso is a 53 y.o. male with a medical diagnosis of S/P liver transplant.  He presents with the following performance deficits affecting function are weakness, impaired functional mobilty, impaired self care skills, decreased lower extremity function, decreased upper extremity function, impaired  balance, impaired endurance, gait instability, impaired cardiopulmonary response to activity, impaired cognition.     Rehab Prognosis:  Good; patient would benefit from acute skilled OT services to address these deficits and reach maximum level of function.       Plan:     Patient to be seen 4 x/week to address the above listed problems via self-care/home management, therapeutic activities, therapeutic exercises, neuromuscular re-education  · Plan of Care Expires: 12/21/18  · Plan of Care Reviewed with: patient    This Plan of care has been discussed with the patient who was involved in its development and understands and is in agreement with the identified goals and treatment plan    GOALS:   Multidisciplinary Problems     Occupational Therapy Goals        Problem: Occupational Therapy Goal    Goal Priority Disciplines Outcome Interventions   Occupational Therapy Goal     OT, PT/OT Ongoing (interventions implemented as appropriate)    Description:  Goals to be met by: 12/18/18     Patient will increase functional independence with ADLs by performing:    UE Dressing with Supervision.  LE Dressing with Minimal Assistance.  Grooming while standing at sink with Stand-by Assistance.  Toileting from toilet with Minimal Assistance for hygiene and clothing management.   Toilet transfer to toilet with Stand-by Assistance.  Upper extremity  theraband exercise program x  15 reps  with independence.                Problem: Occupational Therapy Goal    Goal Priority Disciplines Outcome Interventions   Occupational Therapy Goal     OT, PT/OT Ongoing (interventions implemented as appropriate)                    Time Tracking:     OT Date of Treatment: 12/04/18  OT Start Time: 1019  OT Stop Time: 1045  OT Total Time (min): 26 min    Billable Minutes:Self Care/Home Management 26 min    Angela Hall OT  12/4/2018

## 2018-12-04 NOTE — PROGRESS NOTES
Plan of care reviewed with pt and pt's family members.  CT abdomen done this am.  Pt remains delirious with delayed responses-CT of head ordered.  Psyc consulted.  40 mg lasix x 1 administered this afternoon.  Blood cxs x 2 drawn and awaiting UA.

## 2018-12-04 NOTE — ASSESSMENT & PLAN NOTE
BG goal 140-180    Increase Novolog 3 units with meals  Low dose correction scale given kidney function  BG monitoring AC/HS    Discharge plans- TBD

## 2018-12-04 NOTE — PLAN OF CARE
"Problem: Patient Care Overview  Goal: Plan of Care Review  Dx: Liver transplant (11/11)  hx: ESLD, ETOH abuse; Severe depression.    11/12: liver transplant; extubated. Products given in OR and HD in OR. CRRT nocturnal.  11/13: CRRT nocturnal held; lines removed. FFP x1 given. Pulled out 2 JOSE A drains.   11/14: 3 PRBC's given for hgb 6; liver US shows potential hematoma. Trend CBC. Extreme agitation/attempted to "kill self" by holding breath for as long as possible multiple times. IV ativan/haldol given. Nocturnal CRRT restarted  Potential washout  To be determined.    11/16: OR for exlap and washout--1U PRBCs and 1 plts; sent back to OR--3 U PRBCs, 1FFP, 1cryo; abd open and wound vac placed  11/17: CT chest, abd, pelvis, levo off 2 units PRBCs, 2 units platelets, 1 unit FFP   11/18: OR for closure, extubated   11/19: clear liquid diet   11/20: TPN/Lipids d/c'd, renal diet, transfer orders  11/21: Head and chest CT  11/22: Pt to IR for drain placement  11/23: HD trial  11/25: HD 2 Liters removed  Nursing:   -160          Outcome: Ongoing (interventions implemented as appropriate)  Patient acknowledges understanding with pain scale. Decreased zanaflex due to lethargy.  Non slip footwear in place. Bed lowered. Rails up x 2. Call bell in easy reach. Pt is afebrile. Hand washing per nursing guidelines. [facility]. New bruising noted around left end of chevron incision, pt complains of tenderness to site, hct/hgb decreased and another unit of prbc was given today. Pt received midline. Pt is disoriented to situation. Pt unable to sit up to chair but did sit on side of bed with OT. Pt ate 25-50% of meals today, no appetite. No bm today, awaiting stool sample for ocb      "

## 2018-12-04 NOTE — PLAN OF CARE
Problem: Occupational Therapy Goal  Goal: Occupational Therapy Goal  Goals to be met by: 12/18/18     Patient will increase functional independence with ADLs by performing:    UE Dressing with Supervision.  LE Dressing with Minimal Assistance.  Grooming while standing at sink with Stand-by Assistance.  Toileting from toilet with Minimal Assistance for hygiene and clothing management.   Toilet transfer to toilet with Stand-by Assistance.  Upper extremity  theraband exercise program x  15 reps  with independence.       Outcome: Ongoing (interventions implemented as appropriate)  Goals extended to 12/18/18  TP

## 2018-12-04 NOTE — SUBJECTIVE & OBJECTIVE
"Interval HPI:   Overnight events: remains in TSU. NAEON. BG above goal with scheduled insulin and correction scale coverage. Prednisone 10 mg daily.   Eatin% or less   Nausea: No  Hypoglycemia and intervention: No  Fever: No  TPN and/or TF: No    /86   Pulse 110   Temp 98.1 °F (36.7 °C) (Oral)   Resp 18   Ht 5' 10" (1.778 m)   Wt 79 kg (174 lb 2.6 oz)   SpO2 98%   BMI 24.99 kg/m²     Labs Reviewed and Include    Recent Labs   Lab 18  0702   *   CALCIUM 8.8   ALBUMIN 2.2*   PROT 5.4*      K 3.9   CO2 23      BUN 30*   CREATININE 2.4*   ALKPHOS 273*   ALT 21   AST 17   BILITOT 2.7*     Lab Results   Component Value Date    WBC 11.00 2018    HGB 8.2 (L) 2018    HCT 26.4 (L) 2018    MCV 97 2018     2018     No results for input(s): TSH, FREET4 in the last 168 hours.  Lab Results   Component Value Date    HGBA1C 4.4 2018       Nutritional status:   Body mass index is 24.99 kg/m².  Lab Results   Component Value Date    ALBUMIN 2.2 (L) 2018    ALBUMIN 1.4 (L) 2018    ALBUMIN 1.4 (L) 2018     Lab Results   Component Value Date    PREALBUMIN 7 (L) 10/27/2018       Estimated Creatinine Clearance: 36.8 mL/min (A) (based on SCr of 2.4 mg/dL (H)).    Accu-Checks  Recent Labs     18  2149 18  0822 18  1209 18  1701 18  2105 18  0758 18  1159 18  1612 18  2157 18  0800   POCTGLUCOSE 187* 214* 197* 207* 171* 248* 240* 224* 182* 180*       Current Medications and/or Treatments Impacting Glycemic Control  Immunotherapy:    Immunosuppressants         Stop Route Frequency     tacrolimus capsule 2 mg      -- Oral 2 times daily        Steroids:   Hormones (From admission, onward)    Start     Stop Route Frequency Ordered    12/17/18 0900  predniSONE tablet 2.5 mg      859 Oral Daily 18 1115    12/10/18 0900  predniSONE tablet 5 mg      859 Oral Daily " 11/26/18 1115    12/03/18 0900  predniSONE tablet 10 mg      12/10 0859 Oral Daily 11/26/18 1115    11/09/18 1345  methylPREDNISolone sodium succinate injection 500 mg  (Med - Immunosuppression Induction Therapy (Methylprednisolone))      11/10 0144 IV Once pre-op 11/09/18 1236        Pressors:    Autonomic Drugs (From admission, onward)    Start     Stop Route Frequency Ordered    11/30/18 1058  midodrine tablet 10 mg     Question:  Is the patient competent?  Answer:  Yes    -- Oral 3 times daily PRN 11/30/18 0958        Hyperglycemia/Diabetes Medications:   Antihyperglycemics (From admission, onward)    Start     Stop Route Frequency Ordered    12/01/18 1215  insulin aspart U-100 pen 2 Units      -- SubQ 3 times daily with meals 12/01/18 1102    11/27/18 1122  insulin aspart U-100 pen 0-5 Units      -- SubQ Before meals & nightly PRN 11/27/18 1023

## 2018-12-04 NOTE — ASSESSMENT & PLAN NOTE
Avoid insulin stacking and hypoglycemia.  Nephrology following  Lab Results   Component Value Date    CREATININE 2.4 (H) 12/04/2018

## 2018-12-04 NOTE — SUBJECTIVE & OBJECTIVE
Interval History: HD yesterday at bedside, tolerated w/o issues, unfortunately has not done much to clear up his mental status    Review of patient's allergies indicates:   Allergen Reactions    Penicillins Nausea And Vomiting and Rash     Full body rash from cefepime as well     Current Facility-Administered Medications   Medication Frequency    0.9%  NaCl infusion (for blood administration) Q24H PRN    0.9%  NaCl infusion PRN    0.9%  NaCl infusion PRN    0.9%  NaCl infusion Once    albuterol-ipratropium 2.5 mg-0.5 mg/3 mL nebulizer solution 3 mL Q4H PRN    albuterol-ipratropium 2.5 mg-0.5 mg/3 mL nebulizer solution 3 mL Q4H WAKE    bisacodyl suppository 10 mg Daily PRN    ceFEPIme injection 1 g Q12H    dextrose 50% injection 12.5 g PRN    dextrose 50% injection 25 g PRN    ergocalciferol capsule 50,000 Units Q7 Days    famotidine tablet 20 mg QHS    glucagon (human recombinant) injection 1 mg PRN    glucose chewable tablet 16 g PRN    glucose chewable tablet 24 g PRN    heparin (porcine) injection 1,000 Units PRN    heparin (porcine) injection 5,000 Units Q8H    insulin aspart U-100 pen 0-5 Units QID (AC + HS) PRN    insulin aspart U-100 pen 2 Units TIDWM    isavuconazonium sulfate Cap 372 mg Daily    lidocaine HCL 10 mg/ml (1%) injection 1 mL Once    linezolid tablet 600 mg Q12H    midodrine tablet 10 mg TID PRN    nystatin 100,000 unit/mL suspension 500,000 Units QID (WM & HS)    ondansetron injection 4 mg Q6H PRN    predniSONE tablet 10 mg Daily    Followed by    [START ON 12/10/2018] predniSONE tablet 5 mg Daily    Followed by    [START ON 12/17/2018] predniSONE tablet 2.5 mg Daily    sodium bicarbonate tablet 650 mg BID    sulfamethoxazole-trimethoprim 400-80mg per tablet 1 tablet Every Mon, Wed, Fri    tacrolimus capsule 2 mg BID    tiZANidine tablet 2 mg Q8H PRN    trazodone split tablet 25 mg Nightly PRN    traZODone tablet 50 mg QHS    ursodiol capsule 300 mg BID     valganciclovir 50 mg/ml oral solution 100 mg Once per day on Mon Wed Fri       Objective:     Vital Signs (Most Recent):  Temp: 98.1 °F (36.7 °C) (12/04/18 0757)  Pulse: 110 (12/04/18 0757)  Resp: 18 (12/04/18 0757)  BP: 121/86 (12/04/18 0757)  SpO2: 98 % (12/04/18 0757)  O2 Device (Oxygen Therapy): room air (12/04/18 0757) Vital Signs (24h Range):  Temp:  [97.3 °F (36.3 °C)-98.5 °F (36.9 °C)] 98.1 °F (36.7 °C)  Pulse:  [] 110  Resp:  [18-32] 18  SpO2:  [97 %-99 %] 98 %  BP: (107-121)/(67-86) 121/86     Weight: 79 kg (174 lb 2.6 oz) (12/04/18 0600)  Body mass index is 24.99 kg/m².  Body surface area is 1.98 meters squared.    I/O last 3 completed shifts:  In: 2732.5 [P.O.:1970; Blood:262.5; Other:500]  Out: 2150 [Urine:150; Other:2000]    Physical Exam   Constitutional: He appears well-developed.   Neck: No JVD present.   Cardiovascular: Normal rate and regular rhythm. Exam reveals no friction rub.   Pulmonary/Chest: Effort normal and breath sounds normal.   Abdominal: Soft. He exhibits distension.   Musculoskeletal: He exhibits no edema.   Neurological: He is alert.   Skin: Skin is warm.

## 2018-12-04 NOTE — PLAN OF CARE
Problem: Physical Therapy Goal  Goal: Physical Therapy Goal  Goals to be met by: 2018     Patient will increase functional independence with mobility by performin. Supine to sit with Modified [place] -not met  2. Sit to stand transfer with [place] -not met  3. Bed to chair transfer with independence using no AD -not met  4. Gait  x150 feet with Supervision using LRAD. -not met  5. Lower extremity exercise program x20 reps per handout, with independence -not met        Outcome: Ongoing (interventions implemented as appropriate)  Goals reviewed and remain appropriate. Pt progressing towards goals.    Ramandeep Kumar, PT, DPT   2018  [phone]

## 2018-12-05 LAB
ALBUMIN SERPL BCP-MCNC: 1.7 G/DL
ALP SERPL-CCNC: 260 U/L
ALT SERPL W/O P-5'-P-CCNC: 20 U/L
ANION GAP SERPL CALC-SCNC: 8 MMOL/L
AST SERPL-CCNC: 15 U/L
BACTERIA BLD CULT: NORMAL
BACTERIA BLD CULT: NORMAL
BACTERIA FLD AEROBE CULT: NO GROWTH
BACTERIA UR CULT: NO GROWTH
BASOPHILS # BLD AUTO: 0.19 K/UL
BASOPHILS NFR BLD: 1.9 %
BILIRUB SERPL-MCNC: 2.4 MG/DL
BLD PROD TYP BPU: NORMAL
BLOOD UNIT EXPIRATION DATE: NORMAL
BLOOD UNIT TYPE CODE: 5100
BLOOD UNIT TYPE: NORMAL
BUN SERPL-MCNC: 38 MG/DL
CALCIUM SERPL-MCNC: 8.2 MG/DL
CHLORIDE SERPL-SCNC: 106 MMOL/L
CMV DNA SERPL NAA+PROBE-ACNC: NORMAL IU/ML
CO2 SERPL-SCNC: 24 MMOL/L
CODING SYSTEM: NORMAL
CREAT SERPL-MCNC: 2.9 MG/DL
DIFFERENTIAL METHOD: ABNORMAL
DISPENSE STATUS: NORMAL
EOSINOPHIL # BLD AUTO: 0.1 K/UL
EOSINOPHIL NFR BLD: 1 %
ERYTHROCYTE [DISTWIDTH] IN BLOOD BY AUTOMATED COUNT: 18.3 %
EST. GFR  (AFRICAN AMERICAN): 27.3 ML/MIN/1.73 M^2
EST. GFR  (NON AFRICAN AMERICAN): 23.6 ML/MIN/1.73 M^2
GLUCOSE SERPL-MCNC: 153 MG/DL
GRAM STN SPEC: NORMAL
GRAM STN SPEC: NORMAL
HCT VFR BLD AUTO: 24.4 %
HGB BLD-MCNC: 7.6 G/DL
IMM GRANULOCYTES # BLD AUTO: 0.08 K/UL
IMM GRANULOCYTES NFR BLD AUTO: 0.8 %
INR PPP: 1.1
LYMPHOCYTES # BLD AUTO: 0.5 K/UL
LYMPHOCYTES NFR BLD: 4.9 %
MAGNESIUM SERPL-MCNC: 1.8 MG/DL
MCH RBC QN AUTO: 29.6 PG
MCHC RBC AUTO-ENTMCNC: 31.1 G/DL
MCV RBC AUTO: 95 FL
MONOCYTES # BLD AUTO: 0.5 K/UL
MONOCYTES NFR BLD: 4.7 %
NEUTROPHILS # BLD AUTO: 8.7 K/UL
NEUTROPHILS NFR BLD: 86.7 %
NRBC BLD-RTO: 0 /100 WBC
PHOSPHATE SERPL-MCNC: 3.6 MG/DL
PLATELET # BLD AUTO: 204 K/UL
PMV BLD AUTO: 12.2 FL
POCT GLUCOSE: 137 MG/DL (ref 70–110)
POCT GLUCOSE: 156 MG/DL (ref 70–110)
POCT GLUCOSE: 208 MG/DL (ref 70–110)
POTASSIUM SERPL-SCNC: 3.8 MMOL/L
PROT SERPL-MCNC: 4.7 G/DL
PROTHROMBIN TIME: 11.8 SEC
RBC # BLD AUTO: 2.57 M/UL
SODIUM SERPL-SCNC: 138 MMOL/L
TACROLIMUS BLD-MCNC: 6.7 NG/ML
TRANS ERYTHROCYTES VOL PATIENT: NORMAL ML
WBC # BLD AUTO: 9.99 K/UL

## 2018-12-05 PROCEDURE — 99233 SBSQ HOSP IP/OBS HIGH 50: CPT | Mod: 24,,, | Performed by: PHYSICIAN ASSISTANT

## 2018-12-05 PROCEDURE — 25000003 PHARM REV CODE 250: Performed by: NURSE PRACTITIONER

## 2018-12-05 PROCEDURE — 63600175 PHARM REV CODE 636 W HCPCS: Performed by: PHYSICIAN ASSISTANT

## 2018-12-05 PROCEDURE — 20600001 HC STEP DOWN PRIVATE ROOM

## 2018-12-05 PROCEDURE — 99231 PR SUBSEQUENT HOSPITAL CARE,LEVL I: ICD-10-PCS | Mod: ,,, | Performed by: NURSE PRACTITIONER

## 2018-12-05 PROCEDURE — 99233 SBSQ HOSP IP/OBS HIGH 50: CPT | Mod: ,,, | Performed by: NURSE PRACTITIONER

## 2018-12-05 PROCEDURE — 83735 ASSAY OF MAGNESIUM: CPT

## 2018-12-05 PROCEDURE — P9021 RED BLOOD CELLS UNIT: HCPCS

## 2018-12-05 PROCEDURE — 80197 ASSAY OF TACROLIMUS: CPT

## 2018-12-05 PROCEDURE — 99231 SBSQ HOSP IP/OBS SF/LOW 25: CPT | Mod: ,,, | Performed by: NURSE PRACTITIONER

## 2018-12-05 PROCEDURE — 25000003 PHARM REV CODE 250: Performed by: STUDENT IN AN ORGANIZED HEALTH CARE EDUCATION/TRAINING PROGRAM

## 2018-12-05 PROCEDURE — 80100014 HC HEMODIALYSIS 1:1

## 2018-12-05 PROCEDURE — 99900035 HC TECH TIME PER 15 MIN (STAT)

## 2018-12-05 PROCEDURE — 84100 ASSAY OF PHOSPHORUS: CPT

## 2018-12-05 PROCEDURE — 94640 AIRWAY INHALATION TREATMENT: CPT

## 2018-12-05 PROCEDURE — 99233 PR SUBSEQUENT HOSPITAL CARE,LEVL III: ICD-10-PCS | Mod: ,,, | Performed by: NURSE PRACTITIONER

## 2018-12-05 PROCEDURE — 63600175 PHARM REV CODE 636 W HCPCS: Performed by: NURSE PRACTITIONER

## 2018-12-05 PROCEDURE — 97530 THERAPEUTIC ACTIVITIES: CPT

## 2018-12-05 PROCEDURE — 94664 DEMO&/EVAL PT USE INHALER: CPT

## 2018-12-05 PROCEDURE — 27000646 HC AEROBIKA DEVICE

## 2018-12-05 PROCEDURE — 63600175 PHARM REV CODE 636 W HCPCS: Performed by: STUDENT IN AN ORGANIZED HEALTH CARE EDUCATION/TRAINING PROGRAM

## 2018-12-05 PROCEDURE — 36415 COLL VENOUS BLD VENIPUNCTURE: CPT

## 2018-12-05 PROCEDURE — 94761 N-INVAS EAR/PLS OXIMETRY MLT: CPT

## 2018-12-05 PROCEDURE — 85610 PROTHROMBIN TIME: CPT

## 2018-12-05 PROCEDURE — 80053 COMPREHEN METABOLIC PANEL: CPT

## 2018-12-05 PROCEDURE — 85025 COMPLETE CBC W/AUTO DIFF WBC: CPT

## 2018-12-05 PROCEDURE — 99233 PR SUBSEQUENT HOSPITAL CARE,LEVL III: ICD-10-PCS | Mod: 24,,, | Performed by: PHYSICIAN ASSISTANT

## 2018-12-05 PROCEDURE — 25000242 PHARM REV CODE 250 ALT 637 W/ HCPCS: Performed by: NURSE PRACTITIONER

## 2018-12-05 RX ORDER — SODIUM CHLORIDE 9 MG/ML
INJECTION, SOLUTION INTRAVENOUS ONCE
Status: DISCONTINUED | OUTPATIENT
Start: 2018-12-05 | End: 2018-12-06

## 2018-12-05 RX ORDER — CEFEPIME HYDROCHLORIDE 1 G/1
1 INJECTION, POWDER, FOR SOLUTION INTRAMUSCULAR; INTRAVENOUS
Status: COMPLETED | OUTPATIENT
Start: 2018-12-05 | End: 2018-12-07

## 2018-12-05 RX ORDER — HYDROCODONE BITARTRATE AND ACETAMINOPHEN 500; 5 MG/1; MG/1
TABLET ORAL
Status: DISCONTINUED | OUTPATIENT
Start: 2018-12-05 | End: 2018-12-06

## 2018-12-05 RX ADMIN — MORPHINE 100 MG: 10 SOLUTION ORAL at 09:12

## 2018-12-05 RX ADMIN — LINEZOLID 600 MG: 600 TABLET, FILM COATED ORAL at 09:12

## 2018-12-05 RX ADMIN — NYSTATIN 500000 UNITS: 500000 SUSPENSION ORAL at 07:12

## 2018-12-05 RX ADMIN — LINEZOLID 600 MG: 600 TABLET, FILM COATED ORAL at 07:12

## 2018-12-05 RX ADMIN — IPRATROPIUM BROMIDE AND ALBUTEROL SULFATE 3 ML: .5; 3 SOLUTION RESPIRATORY (INHALATION) at 01:12

## 2018-12-05 RX ADMIN — IPRATROPIUM BROMIDE AND ALBUTEROL SULFATE 3 ML: .5; 3 SOLUTION RESPIRATORY (INHALATION) at 07:12

## 2018-12-05 RX ADMIN — TIZANIDINE 2 MG: 2 TABLET ORAL at 09:12

## 2018-12-05 RX ADMIN — HEPARIN SODIUM 5000 UNITS: 5000 INJECTION, SOLUTION INTRAVENOUS; SUBCUTANEOUS at 09:12

## 2018-12-05 RX ADMIN — CEFEPIME 1 G: 1 INJECTION, POWDER, FOR SOLUTION INTRAMUSCULAR; INTRAVENOUS at 09:12

## 2018-12-05 RX ADMIN — URSODIOL 300 MG: 300 CAPSULE ORAL at 09:12

## 2018-12-05 RX ADMIN — SODIUM BICARBONATE 650 MG TABLET 650 MG: at 07:12

## 2018-12-05 RX ADMIN — FAMOTIDINE 20 MG: 20 TABLET ORAL at 09:12

## 2018-12-05 RX ADMIN — SULFAMETHOXAZOLE AND TRIMETHOPRIM 1 TABLET: 400; 80 TABLET ORAL at 09:12

## 2018-12-05 RX ADMIN — CEFEPIME 1 G: 1 INJECTION, POWDER, FOR SOLUTION INTRAMUSCULAR; INTRAVENOUS at 02:12

## 2018-12-05 RX ADMIN — INSULIN ASPART 3 UNITS: 100 INJECTION, SOLUTION INTRAVENOUS; SUBCUTANEOUS at 04:12

## 2018-12-05 RX ADMIN — NYSTATIN 500000 UNITS: 500000 SUSPENSION ORAL at 04:12

## 2018-12-05 RX ADMIN — ISAVUCONAZONIUM SULFATE 372 MG: 186 CAPSULE ORAL at 07:12

## 2018-12-05 RX ADMIN — TRAZODONE HYDROCHLORIDE 50 MG: 50 TABLET ORAL at 09:12

## 2018-12-05 RX ADMIN — INSULIN ASPART 3 UNITS: 100 INJECTION, SOLUTION INTRAVENOUS; SUBCUTANEOUS at 11:12

## 2018-12-05 RX ADMIN — NYSTATIN 500000 UNITS: 500000 SUSPENSION ORAL at 09:12

## 2018-12-05 RX ADMIN — URSODIOL 300 MG: 300 CAPSULE ORAL at 07:12

## 2018-12-05 RX ADMIN — SODIUM BICARBONATE 650 MG TABLET 650 MG: at 04:12

## 2018-12-05 RX ADMIN — IPRATROPIUM BROMIDE AND ALBUTEROL SULFATE 3 ML: .5; 3 SOLUTION RESPIRATORY (INHALATION) at 04:12

## 2018-12-05 RX ADMIN — INSULIN ASPART 2 UNITS: 100 INJECTION, SOLUTION INTRAVENOUS; SUBCUTANEOUS at 11:12

## 2018-12-05 RX ADMIN — INSULIN ASPART 3 UNITS: 100 INJECTION, SOLUTION INTRAVENOUS; SUBCUTANEOUS at 07:12

## 2018-12-05 RX ADMIN — NYSTATIN 500000 UNITS: 500000 SUSPENSION ORAL at 11:12

## 2018-12-05 RX ADMIN — HEPARIN SODIUM 5000 UNITS: 5000 INJECTION, SOLUTION INTRAVENOUS; SUBCUTANEOUS at 05:12

## 2018-12-05 RX ADMIN — HEPARIN SODIUM 5000 UNITS: 5000 INJECTION, SOLUTION INTRAVENOUS; SUBCUTANEOUS at 02:12

## 2018-12-05 RX ADMIN — PREDNISONE 10 MG: 5 TABLET ORAL at 07:12

## 2018-12-05 RX ADMIN — IPRATROPIUM BROMIDE AND ALBUTEROL SULFATE 3 ML: .5; 3 SOLUTION RESPIRATORY (INHALATION) at 08:12

## 2018-12-05 NOTE — ASSESSMENT & PLAN NOTE
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   - s/p OHLTx 11/11/2018; intra-op SLED  - remaining oliguric   -hemodynamically stable, off pressors    Plan  -patient with only 200 mL of urine after Lasix given on yesterday. sCr and BUN still trending up, 2.9 and 38 today.   -improved mental status this AM  -will provide dialysis today for metabolic clearance and volume management  -target UF 1-2 L as tolerated by patient   -closely follow strict Is & Os and serial RFPs  -w/u cause for fluctuating mental status, not likely to be related to kidneys

## 2018-12-05 NOTE — SUBJECTIVE & OBJECTIVE
Interval History: ID called back to evaluate patient after development of empyema since last evaluation    Review of Systems    Objective:     Vital Signs (Most Recent):  Temp: 98.5 °F (36.9 °C) (12/04/18 1550)  Pulse: 109 (12/04/18 2011)  Resp: (!) 24 (12/04/18 2011)  BP: 117/76 (12/04/18 2011)  SpO2: 99 % (12/04/18 2011) Vital Signs (24h Range):  Temp:  [98.1 °F (36.7 °C)-98.5 °F (36.9 °C)] 98.5 °F (36.9 °C)  Pulse:  [] 109  Resp:  [17-31] 24  SpO2:  [97 %-99 %] 99 %  BP: (108-121)/(67-86) 117/76     Weight: 79 kg (174 lb 2.6 oz)  Body mass index is 24.99 kg/m².    Estimated Creatinine Clearance: 36.8 mL/min (A) (based on SCr of 2.4 mg/dL (H)).    Physical Exam   Constitutional: He is oriented to person, place, and time. He appears well-developed and well-nourished.   HENT:   Head: Normocephalic and atraumatic.   Eyes: Conjunctivae and EOM are normal. Pupils are equal, round, and reactive to light.   Neck: Normal range of motion. Neck supple.   Cardiovascular: Normal rate, regular rhythm and normal heart sounds.   Pulmonary/Chest: Effort normal. He has rales.   Abdominal: Soft. Bowel sounds are normal. He exhibits no distension. There is no tenderness. There is no rebound.   Musculoskeletal: Normal range of motion. He exhibits no edema, tenderness or deformity.   Neurological: He is alert and oriented to person, place, and time.   Skin: No rash noted. No erythema.       Significant Labs:   Blood Culture:   Recent Labs   Lab 11/14/18  1500 11/21/18  1730 11/21/18  1742 11/30/18  1102 11/30/18  1107   LABBLOO No growth after 5 days. No growth after 5 days. No growth after 5 days. No Growth to date  No Growth to date  No Growth to date  No Growth to date  No Growth to date No Growth to date  No Growth to date  No Growth to date  No Growth to date  No Growth to date     BMP:   Recent Labs   Lab 12/04/18  0702   *      K 3.9      CO2 23   BUN 30*   CREATININE 2.4*   CALCIUM 8.8   MG 2.0      CBC:   Recent Labs   Lab 12/03/18  0630 12/04/18  0702   WBC 11.64 11.00   HGB 7.1* 8.2*   HCT 22.2* 26.4*    200     CMP:   Recent Labs   Lab 12/03/18  0630 12/04/18  0702    140   K 4.7 3.9    107   CO2 22* 23   * 167*   BUN 53* 30*   CREATININE 3.5* 2.4*   CALCIUM 8.0* 8.8   PROT 4.7* 5.4*   ALBUMIN 1.4* 2.2*   BILITOT 2.6* 2.7*   ALKPHOS 261* 273*   AST 17 17   ALT 26 21   ANIONGAP 9 10   EGFRNONAA 18.8* 29.7*     Microbiology Results (last 7 days)     Procedure Component Value Units Date/Time    Urine culture [[id number]] Collected:  12/04/18 1712    Order Status:  Sent Specimen:  Urine, Clean Catch Updated:  12/04/18 1819    Blood culture [[id number]] Collected:  12/04/18 1232    Order Status:  Sent Specimen:  Blood Updated:  12/04/18 1253    Blood culture [[id number]] Collected:  12/04/18 1245    Order Status:  Sent Specimen:  Blood Updated:  12/04/18 1253    Blood culture [[id number]] Collected:  11/30/18 1107    Order Status:  Completed Specimen:  Blood Updated:  12/04/18 1212     Blood Culture, Routine No Growth to date     Blood Culture, Routine No Growth to date     Blood Culture, Routine No Growth to date     Blood Culture, Routine No Growth to date     Blood Culture, Routine No Growth to date    Narrative:       Draw 15 min apart    Blood culture [[id number]] Collected:  11/30/18 1102    Order Status:  Completed Specimen:  Blood Updated:  12/04/18 1212     Blood Culture, Routine No Growth to date     Blood Culture, Routine No Growth to date     Blood Culture, Routine No Growth to date     Blood Culture, Routine No Growth to date     Blood Culture, Routine No Growth to date    Narrative:       Draw 15 min apart    Culture, Anaerobic [[id number]] Collected:  11/30/18 1617    Order Status:  Completed Specimen:  Body Fluid from Pleural Fluid Updated:  12/04/18 1119     Anaerobic Culture Culture in progress    Culture, Body Fluid (Aerobic) w/ GS [[id number]] Collected:  11/30/18 1617     Order Status:  Completed Specimen:  Body Fluid from Pleural Fluid Updated:  12/04/18 1037     AEROBIC CULTURE - FLUID No growth     Gram Stain Result Rare WBC's      No organisms seen    Urine culture [[id number]] Collected:  11/30/18 1201    Order Status:  Completed Specimen:  Urine, Clean Catch Updated:  12/03/18 1455     Urine Culture, Routine --     LANIE GLABRATA  > 100,000 cfu/ml  Treatment of asymptomatic candiduria is not recommended (except for   specific populations). Candida isolated in the urine typically   represents colonization. If an indwelling urinary catheter is present  it should be removed or replaced.      Fungus culture [[id number]] Collected:  11/30/18 1617    Order Status:  Completed Specimen:  Body Fluid from Pleural Fluid Updated:  12/03/18 0949     Fungus (Mycology) Culture Culture in progress    Narrative:       Right    Fungus culture [[id number]] Collected:  11/18/18 1530    Order Status:  Completed Specimen:  Body Fluid from Peritoneal Fluid Updated:  12/03/18 0842     Fungus (Mycology) Culture Culture in progress     Fungus (Mycology) Culture No fungus isolated after 2 weeks    Narrative:       PELVIC FLUID    Fungus culture [[id number]] Collected:  11/18/18 1530    Order Status:  Completed Specimen:  Body Fluid from Abdomen Updated:  12/03/18 0842     Fungus (Mycology) Culture Culture in progress     Fungus (Mycology) Culture No fungus isolated after 2 weeks    Narrative:       Abdominal Fluid    Urine culture [[id number]] Collected:  12/01/18 1530    Order Status:  Completed Specimen:  Urine, Clean Catch Updated:  12/02/18 1958     Urine Culture, Routine No growth    Gram stain [[id number]] Collected:  11/30/18 1617    Order Status:  Canceled Specimen:  Body Fluid from Pleural Fluid Updated:  11/30/18 2331          Significant Imaging: I have reviewed all pertinent imaging results/findings within the past 24 hours.

## 2018-12-05 NOTE — ASSESSMENT & PLAN NOTE
- c/o increased pain w deep breath today to right side.  CXR showed increased opacity in the inferior hemothorax on the right side since 11/26/2018.  Pulse ox 97-99% on RA.  Cont to monitor    - continues to have fluid to RLL.  WBC remains elevated.    - thoracentesis performed on 11/30. Fluid noted with 4k WBC, 93 SEGS. cx in progress.  Cefepime added for treatment.  ID consulted to comment on abx.  apprec recs.   - Chest CT showing right pleural effusion improved, left w increased pleural effusion.  Pulse ox 97-99% on RA.   - ABGs 12/3 ok- CO2 35.2.

## 2018-12-05 NOTE — ASSESSMENT & PLAN NOTE
- 53 year old male with history of ESLD 2/2 ETOH cirrhosis who is s/p liver transplant c/b take-back x 3 for bleeding most recently 11/18.  - LFTs now improving slowly.  T bili was 37.6 day of transplant.  - Pt remains with significant debility. Pt will need SNF placement when medically stable.   - Appetite slowly improving.  Tolerating supplements.  Encourage PO intake.   - Drain placed during take back. Drain placed to intra-abdominal abscess on 11/22.   - Fluid from collection noted with enterococcus VRE. Cont with antibxs per ID.  De escalated to PO on 11/29/18.    - Abdominal pain well controlled, and having BMs.    - HD per nephrology.   - Muscle relaxer started and will hold off on oxycodone for now.

## 2018-12-05 NOTE — SUBJECTIVE & OBJECTIVE
"Interval HPI:   Overnight events:  Remains in TSU. BG at or slightly above goal with scheduled insulin; required correction scale x1 yesterday. Prednisone 10 mg daily; standard steroid taper. Creatinine remains elevated at 2.9; CRRT.   Eatin%   Nausea: No  Hypoglycemia and intervention: No  Fever: No  TPN and/or TF: No      /74   Pulse 103   Temp 98.7 °F (37.1 °C)   Resp 18   Ht 5' 10" (1.778 m)   Wt 81 kg (178 lb 9.2 oz)   SpO2 97%   BMI 25.62 kg/m²     Labs Reviewed and Include    Recent Labs   Lab 18  0630   *   CALCIUM 8.2*   ALBUMIN 1.7*   PROT 4.7*      K 3.8   CO2 24      BUN 38*   CREATININE 2.9*   ALKPHOS 260*   ALT 20   AST 15   BILITOT 2.4*     Lab Results   Component Value Date    WBC 9.99 2018    HGB 7.6 (L) 2018    HCT 24.4 (L) 2018    MCV 95 2018     2018     No results for input(s): TSH, FREET4 in the last 168 hours.  Lab Results   Component Value Date    HGBA1C 4.4 2018       Nutritional status:   Body mass index is 25.62 kg/m².  Lab Results   Component Value Date    ALBUMIN 1.7 (L) 2018    ALBUMIN 2.2 (L) 2018    ALBUMIN 1.4 (L) 2018     Lab Results   Component Value Date    PREALBUMIN 7 (L) 10/27/2018       Estimated Creatinine Clearance: 30.4 mL/min (A) (based on SCr of 2.9 mg/dL (H)).    Accu-Checks  Recent Labs     18  2105 18  0758 18  1159 18  1612 18  2157 18  0800 18  1148 18  1716 18  2059 18  0744   POCTGLUCOSE 171* 248* 240* 224* 182* 180* 213* 171* 152* 156*       Current Medications and/or Treatments Impacting Glycemic Control  Immunotherapy:    Immunosuppressants     None        Steroids:   Hormones (From admission, onward)    Start     Stop Route Frequency Ordered    18 0900  predniSONE tablet 2.5 mg       0859 Oral Daily 18 1115    12/10/18 0900  predniSONE tablet 5 mg       0859 Oral Daily " 11/26/18 1115    12/03/18 0900  predniSONE tablet 10 mg      12/10 0859 Oral Daily 11/26/18 1115    11/09/18 1345  methylPREDNISolone sodium succinate injection 500 mg  (Med - Immunosuppression Induction Therapy (Methylprednisolone))      11/10 0144 IV Once pre-op 11/09/18 1236        Pressors:    Autonomic Drugs (From admission, onward)    Start     Stop Route Frequency Ordered    11/30/18 1058  midodrine tablet 10 mg     Question:  Is the patient competent?  Answer:  Yes    -- Oral 3 times daily PRN 11/30/18 0958        Hyperglycemia/Diabetes Medications:   Antihyperglycemics (From admission, onward)    Start     Stop Route Frequency Ordered    12/04/18 1130  insulin aspart U-100 pen 3 Units      -- SubQ 3 times daily with meals 12/04/18 1059    11/27/18 1122  insulin aspart U-100 pen 0-5 Units      -- SubQ Before meals & nightly PRN 11/27/18 1023

## 2018-12-05 NOTE — ASSESSMENT & PLAN NOTE
Managed per LTS.   avoid hypoglycemia  Optimize BG control for surgical wound healing.     Lab Results   Component Value Date    ALT 20 12/05/2018    AST 15 12/05/2018     (H) 11/26/2018    ALKPHOS 260 (H) 12/05/2018    BILITOT 2.4 (H) 12/05/2018

## 2018-12-05 NOTE — SUBJECTIVE & OBJECTIVE
Scheduled Meds:   sodium chloride 0.9%   Intravenous Once    sodium chloride 0.9%   Intravenous Once    albuterol-ipratropium  3 mL Nebulization Q4H WAKE    ceFEPime (MAXIPIME) IVPB  1 g Intravenous Q24H    ergocalciferol  50,000 Units Oral Q7 Days    famotidine  20 mg Oral QHS    heparin (porcine)  5,000 Units Subcutaneous Q8H    insulin aspart U-100  3 Units Subcutaneous TIDWM    isavuconazonium sulfate  372 mg Oral Daily    lidocaine HCL 10 mg/ml (1%)  1 mL Intradermal Once    linezolid  600 mg Oral Q12H    nystatin  500,000 Units Oral QID (WM & HS)    predniSONE  10 mg Oral Daily    Followed by    [START ON 12/10/2018] predniSONE  5 mg Oral Daily    Followed by    [START ON 12/17/2018] predniSONE  2.5 mg Oral Daily    sodium bicarbonate  650 mg Oral BID    sulfamethoxazole-trimethoprim 400-80mg  1 tablet Oral Every Mon, Wed, Fri    traZODone  50 mg Oral QHS    ursodiol  300 mg Oral BID    valganciclovir 50 mg/ml  100 mg Oral Once per day on Mon Wed Fri     Continuous Infusions:  PRN Meds:sodium chloride, sodium chloride, sodium chloride 0.9%, sodium chloride 0.9%, albuterol-ipratropium, bisacodyl, dextrose 50%, dextrose 50%, glucagon (human recombinant), glucose, glucose, heparin (porcine), insulin aspart U-100, midodrine, ondansetron, tiZANidine, traZODone    Review of Systems   Constitutional: Positive for activity change, appetite change (improving) and fatigue. Negative for fever.   HENT: Negative.  Negative for facial swelling.    Eyes: Negative.    Respiratory: Positive for shortness of breath (w exertion). Negative for apnea, chest tightness and wheezing.    Cardiovascular: Negative.  Negative for chest pain, palpitations and leg swelling.   Gastrointestinal: Positive for abdominal distention and abdominal pain. Negative for constipation, diarrhea, nausea and vomiting.   Genitourinary: Positive for decreased urine volume. Negative for difficulty urinating, dysuria, hematuria and  urgency.   Musculoskeletal: Negative.  Negative for back pain, gait problem, neck pain and neck stiffness.   Skin: Positive for wound. Negative for color change and pallor.   Allergic/Immunologic: Positive for immunocompromised state.   Neurological: Positive for weakness. Negative for dizziness, seizures, light-headedness and headaches.   Psychiatric/Behavioral: Positive for decreased concentration and dysphoric mood. Negative for behavioral problems, confusion, hallucinations, sleep disturbance and suicidal ideas. The patient is not nervous/anxious.      Objective:     Vital Signs (Most Recent):  Temp: 98.1 °F (36.7 °C) (12/05/18 1221)  Pulse: 96 (12/05/18 1300)  Resp: 16 (12/05/18 1300)  BP: (!) 127/91 (12/05/18 1300)  SpO2: 97 % (12/05/18 1150) Vital Signs (24h Range):  Temp:  [98.1 °F (36.7 °C)-98.7 °F (37.1 °C)] 98.1 °F (36.7 °C)  Pulse:  [] 96  Resp:  [16-29] 16  SpO2:  [97 %-99 %] 97 %  BP: (109-142)/(74-93) 127/91     Weight: 81 kg (178 lb 9.2 oz)  Body mass index is 25.62 kg/m².    Intake/Output - Last 3 Shifts       12/03 0700 - 12/04 0659 12/04 0700 - 12/05 0659 12/05 0700 - 12/06 0659    P.O. 1520 370     Blood 262.5  250    Other 500      Total Intake(mL/kg) 2282.5 (28.9) 370 (4.6) 250 (3.1)    Urine (mL/kg/hr) 150 (0.1) 200 (0.1)     Emesis/NG output 0      Other 2000      Stool 0 0     Blood       Total Output 2150 200     Net +132.5 +170 +250           Urine Occurrence 0 x      Stool Occurrence 0 x 1 x     Emesis Occurrence 0 x            Physical Exam   Constitutional: He is oriented to person, place, and time. He appears well-developed. No distress.   HENT:   Head: Normocephalic and atraumatic.   White drainage noted to back of throat and roof on mouth, voice hoarse   Eyes: Pupils are equal, round, and reactive to light. Scleral icterus is present.   Neck: Normal range of motion. Neck supple. No JVD present.   Cardiovascular: Normal rate, regular rhythm and normal heart sounds.   No murmur  heard.  Pulmonary/Chest: Effort normal. No respiratory distress. He has decreased breath sounds in the right lower field and the left lower field. He has no wheezes. He exhibits no tenderness.   Taking short breaths, IS up to 500   Abdominal: Soft. Bowel sounds are normal. He exhibits no distension. There is tenderness.   Chevron inc ECTOR w/ staples  RLQ JOSE A drain and percutaneous drain site w suture CDI.    Musculoskeletal: Normal range of motion. He exhibits edema. He exhibits no tenderness.   Neurological: He is alert and oriented to person, place, and time. He has normal reflexes.   Skin: Skin is warm and dry. He is not diaphoretic.   Psychiatric: His affect is labile. He is slowed. Cognition and memory are impaired.   Nursing note and vitals reviewed.      Laboratory:  Immunosuppressants     None        CBC:   Recent Labs   Lab 12/05/18  0630   WBC 9.99   RBC 2.57*   HGB 7.6*   HCT 24.4*      MCV 95   MCH 29.6   MCHC 31.1*     CMP:   Recent Labs   Lab 12/05/18  0630   *   CALCIUM 8.2*   ALBUMIN 1.7*   PROT 4.7*      K 3.8   CO2 24      BUN 38*   CREATININE 2.9*   ALKPHOS 260*   ALT 20   AST 15   BILITOT 2.4*     Coagulation:   Recent Labs   Lab 11/29/18  0703  12/05/18  0629   INR 1.2   < > 1.1   APTT 30.9  --   --     < > = values in this interval not displayed.     Tacrolimus Lvl   Date Value Ref Range Status   12/05/2018 6.7 5.0 - 15.0 ng/mL Final     Comment:     Testing performed by Liquid Chromatography-Tandem  Mass Spectrometry (LC-MS/MS).  This test was developed and its performance characteristics  determined by [facility], Department of Pathology  and Laboratory Medicine in a manner consistent with CLIA  requirements. It has not been cleared or approved by the [place]  Food and Drug Administration.  This test is used for clinical   purposes.  It should not be regarded as investigational or for  research.     12/04/2018 14.1 5.0 - 15.0 ng/mL Final     Comment:     Testing  performed by Liquid Chromatography-Tandem  Mass Spectrometry (LC-MS/MS).  This test was developed and its performance characteristics  determined by [facility], Department of Pathology  and Laboratory Medicine in a manner consistent with CLIA  requirements. It has not been cleared or approved by the [place]  Food and Drug Administration.  This test is used for clinical   purposes.  It should not be regarded as investigational or for  research.     12/03/2018 12.3 5.0 - 15.0 ng/mL Final     Comment:     Testing performed by Liquid Chromatography-Tandem  Mass Spectrometry (LC-MS/MS).  This test was developed and its performance characteristics  determined by [facility], Department of Pathology  and Laboratory Medicine in a manner consistent with CLIA  requirements. It has not been cleared or approved by the [place]  Food and Drug Administration.  This test is used for clinical   purposes.  It should not be regarded as investigational or for  research.     12/02/2018 9.4 5.0 - 15.0 ng/mL Final     Comment:     Testing performed by Liquid Chromatography-Tandem  Mass Spectrometry (LC-MS/MS).  This test was developed and its performance characteristics  determined by [facility], Department of Pathology  and Laboratory Medicine in a manner consistent with CLIA  requirements. It has not been cleared or approved by the [place]  Food and Drug Administration.  This test is used for clinical   purposes.  It should not be regarded as investigational or for  research.           Labs within the past 24 hours have been reviewed.    Diagnostic Results:  None

## 2018-12-05 NOTE — ASSESSMENT & PLAN NOTE
- 54yo male PMHXof DM2 and alcohol dependence with associated hepatitis was admitted on 10/27/2018 with acute liver failure ultimately underwent liver transplantation (s/p DBDLT 11/11/2018, CMV D+/R-, steroid induction, on maintenance tacro/MMF/pred  - c/b post-operative delirium, VRE bacteruria, and IA hemorrhage requiring re-do ex-lap RP/retrorenal/retrocolic hematoma evacuation  - ID was consulted on 11/18 for VRE.faecium infected IA hematoma (with growth from washout cultures).   - Was on linezolid 600mg PO q12h for an additional 10 day (stop date: 12/8/2018)  - on posaconazole for mold prophylaxis per institutional protocol (risk factor: hemorrhage/washout)  - ID called back 12/3/18 for recommendations in setting of acute right sided empyema. S/P thoracentesis WBC 3969 segs 91%  - pleural cultures with NGTD   - recommend to start cefepime will follow cultures to determine if coverage will need adjustment  - remainder of prophylaxis per protocol  - follow-up per primary team  - thank you for consult will sign off, please call with new developments or questions     Recommendations:  - continue cefepime to complete 7 days EOT 12/7/18   - continue linezolid as previously recommended EOT 12/8/18  - continue isavuconazole

## 2018-12-05 NOTE — PROGRESS NOTES
"Update  SW presented to pt's room today to f/u for continuity of care.  Pt found lying supine in bed receiving HD in room.  Pt with difficulty remaining awake and alert during SW's conversation with pt.  Pt's spouse Suzy and mother present.  Per Suzy, pt is not himself and continues to exhibit disorientation and confusion.  Suzy reports being mentally "tired" and misses home.  Suzy reports getting adequate sleep and eating well.  Suzy denies any other mental health difficulties, coping issues, or psychosocial concerns at this time.  Suzy states plans of moving out of [facility] on Saturday 12/8/18 and moving into a local "cottage" nearby as she is tired of staying in a hotel room.  Suzy uncertain of whether this cottage would be local lodging option for pt or not due to pt's expected prolonged hospital stay and likely need for transfer to rehab facility prior to discharging to the community.  Suzy is not interested in [place] as local lodging option for pt.  Suzy expressed desire to have pt eventually transferred to [facility] in [place], [place] as there is "transplant rehab" there.  VITALIY discussed with Suzy potential difficulty and risk of having such a transfer take place and encouraged Suzy to discuss the idea further with transplant surgeon.  Suzy expressed understanding regarding same.  SW remains available for further psychosocial support and stated plans of f/u as needed.  "

## 2018-12-05 NOTE — PLAN OF CARE
Problem: Physical Therapy Goal  Goal: Physical Therapy Goal  Goals to be met by: 2018     Patient will increase functional independence with mobility by performin. Supine to sit with Modified [place] -not met  2. Sit to stand transfer with [place] -not met  3. Bed to chair transfer with independence using no AD -not met  4. Gait  x150 feet with Supervision using LRAD. -not met  5. Lower extremity exercise program x20 reps per handout, with independence -not met        Outcome: Ongoing (interventions implemented as appropriate)  Goals reviewed and remain appropriate. Pt progressing towards goals.    Ramandeep Kumar, PT, DPT   2018  [phone]

## 2018-12-05 NOTE — ASSESSMENT & PLAN NOTE
BG goal 140-180    continue Novolog 3 units with meals  Low dose correction scale given kidney function  BG monitoring AC/HS    Discharge plans- TBD

## 2018-12-05 NOTE — ASSESSMENT & PLAN NOTE
Avoid insulin stacking and hypoglycemia.  Nephrology following  Lab Results   Component Value Date    CREATININE 2.9 (H) 12/05/2018

## 2018-12-05 NOTE — PT/OT/SLP PROGRESS
Physical Therapy Treatment    Patient Name:  Femi Enciso   MRN:  [id number]    Recommendations:     Discharge Recommendations:  nursing facility, skilled   Discharge Equipment Recommendations: (TBD)   Barriers to discharge: Decreased caregiver support at current functional level    Assessment:     Femi Enciso is a 53 y.o. male admitted with a medical diagnosis of S/P liver transplant.  He presents with the following impairments/functional limitations:  weakness, gait instability, impaired balance, impaired endurance, impaired self care skills, impaired functional mobilty, impaired cardiopulmonary response to activity, decreased safety awareness, pain. Continues to require increased assist to complete functional mobility. Remains with decreased ability to achieve full standing, but slightly improved compared to yesterday's session. Activity tolerance remains limited with pt only able to maintain static stand for a few seconds before requiring return to sit. Pt would continue to benefit from skilled acute PT in order to address current deficits and progress functional mobility.     Rehab Prognosis:  good; patient would benefit from acute skilled PT services to address these deficits and reach maximum level of function.       Recent Surgery: Procedure(s) (LRB):  LAPAROTOMY, EXPLORATORY, AFTER LIVER TRANSPLANT, LIKELY  ABDOMINAL CLOSURE (N/A) 17 Days Post-Op    Plan:     During this hospitalization, patient to be seen 4 x/week to address the above listed problems via gait training, therapeutic activities, therapeutic exercises, neuromuscular re-education  · Plan of Care Expires:  12/19/18   Plan of Care Reviewed with: patient, spouse, mother    Subjective     Communicated with RN prior to session.  Patient found supine upon PT entry to room, agreeable to treatment.    Therapy tech William present throughout session    Chief Complaint: back pain   Pain/Comfort:  · Pain Rating 1: (did not rate)  · Location 1: back  · Pain  Addressed 1: Reposition, Distraction    Patients cultural, spiritual, Amish conflicts given the current situation: none noted     Objective:     Patient found with: telemetry, pulse ox (continuous)     General Precautions: Standard, fall, contact   Orthopedic Precautions:N/A   Braces: N/A     Functional Mobility:  · Bed Mobility:     · Supine to Sit: maximal assistance and with HOB elevated and side rail  · Max cues throughout for technique and sequencing   · Transfers:    · Sit to Stand:  maximal assistance and of 2 persons with no AD and x4 reps  · Max v/c and t/c for proper hand and foot placement, use of forward momentum, and increased hip and knee ext  · Cues provided each trial with decreased carry-over noted between trials   · B knees blocked to prevent buckling and facilitate knee ext  · Decreased ability to achieve full stand with full knee and hip ext   · Bed to Chair: maximal assistance and of 2 persons with  no AD  using  Stand Pivot  · B knees blocked throughout with buckling noted   · Decreased ability to weight-shift appropriately or clear B feet from floor to advance      AM-PAC 6 CLICK MOBILITY  Turning over in bed (including adjusting bedclothes, sheets and blankets)?: 2  Sitting down on and standing up from a chair with arms (e.g., wheelchair, bedside commode, etc.): 2  Moving from lying on back to sitting on the side of the bed?: 2  Moving to and from a bed to a chair (including a wheelchair)?: 2  Need to walk in hospital room?: 1  Climbing 3-5 steps with a railing?: 1  Basic Mobility Total Score: 10       Therapeutic Activities and Exercises:   Performed functional mobility as described above. Required extended seated rest breaks for recovery with increased WOB noted. Cues for pursed lip breathing provided. SpO2 98% per Visi.   Static standing x3 trials with maxAx2, x~5-8 sec. Max v/c and t/c for upright posture, increased knee and hip ext; B knees blocked but remained with increased knee  flex despite maxA to attempt to achieve knee ext.   Encouragement provided throughout session from therapist, wife, and mother for continued participation in therapy. Education provided on the importance of OOB activity and participation in therapy.     Patient left up in chair with all lines intact, call button in reach, RN notified and pt's wife and mother present..    GOALS:   Multidisciplinary Problems     Physical Therapy Goals        Problem: Physical Therapy Goal    Goal Priority Disciplines Outcome Goal Variances Interventions   Physical Therapy Goal     PT, PT/OT Ongoing (interventions implemented as appropriate)     Description:  Goals to be met by: 2018     Patient will increase functional independence with mobility by performin. Supine to sit with Modified [place] -not met  2. Sit to stand transfer with [place] -not met  3. Bed to chair transfer with independence using no AD -not met  4. Gait  x150 feet with Supervision using LRAD. -not met  5. Lower extremity exercise program x20 reps per handout, with independence -not met                Problem: Physical Therapy Goal    Goal Priority Disciplines Outcome Goal Variances Interventions   Physical Therapy Goal     PT, PT/OT                      Time Tracking:     PT Received On: 18  PT Start Time: 922     PT Stop Time: 952  PT Total Time (min): 30 min     Billable Minutes: Therapeutic Activity 30    Treatment Type: Treatment  PT/PTA: PT     PTA Visit Number: 0     Ramandeep Kumar, PT, DPT   2018  [phone]

## 2018-12-05 NOTE — PROGRESS NOTES
Plan of care reviewed with pt and pt's family.  All questions answered.  Pt up to chair for 2 hours today.  Had HD at bedside and received 1 unit prbcs-H/H 7.6/24.4 this am.  Will continue to monitor.

## 2018-12-05 NOTE — ASSESSMENT & PLAN NOTE
- Renal function slow to recover post-op.   - Nephrology consulted for management.   - Cont with HD as per nephrology recs.  Apprec recs.    - HD on 11/27 w/ 2L off. Pt tolerated well.    - Lasix 160 mg challenge 11/28 w/ minimal output.    - HD performed on 12/3 at bedside. 1.5 removed, tolerated.   - Strict I&Os.

## 2018-12-05 NOTE — ASSESSMENT & PLAN NOTE
- DEL for over 2 weeks prior to transplant. Nephrology was consulted.     - Post transplant, Nephrology cont to follow.  He received HD last on 11/30 for metabolic clearance and fluid management.     - Attempted Lasix challenge (160 mg) x1 on 11/28- pt diuresed 105 cc.    - HD 12/3- removed 1.5 liter.  Cont strict I/O's.  Monitor closely.  - lasix 40 mg x1 today,  Urinated 150 ml.

## 2018-12-05 NOTE — ASSESSMENT & PLAN NOTE
- c/o increased pain w deep breath today to right side.  CXR showed increased opacity in the inferior hemothorax on the right side since 11/26/2018.  Pulse ox 97-99% on RA.  Cont to monitor    - continues to have fluid to RLL.  WBC remains elevated.    - thoracentesis performed on 11/30. Fluid noted with 4k WBC, 93 SEGS. cx no growth to date.  Cefepime added for treatment.  - Chest CT showing right pleural effusion improved, left w increased pleural effusion.   -Per ID, will treat empyema

## 2018-12-05 NOTE — ASSESSMENT & PLAN NOTE
- Pt with intermittent confusion since transplant was improving slowly.   - Pt with some lethargy and confusion on 11/21. Head CT negative.   - AAOx3. More alert and awake on 11/27.   - AAOx4 on 11/29.  He was happy he could remember what day it is today.    - confusion worse over the wkd.  Seroquel d/c'd 11/30/18.  Trazodone for insomnia ordered w PRN dose.    - re consulted psych, awaiting recommendations.   - delirium could be d/t rising prograf.  - CT head 12/4 with no acute findings.

## 2018-12-05 NOTE — ASSESSMENT & PLAN NOTE
- See intra-abdominal abscess.  - wbc improving, cont w delirium.  Repeat cx today.  Cont Linezolid and Cefepime per ID.

## 2018-12-05 NOTE — ASSESSMENT & PLAN NOTE
- Pt with intermittent confusion since transplant was improving slowly.   - Pt with some lethargy and confusion on 11/21. Head CT negative.   - AAOx3. More alert and awake on 11/27.   - AAOx4 on 11/29.  He was happy he could remember what day it is today.    - confusion worse over the wkd.  Seroquel d/c'd 11/30/18.  Trazodone for insomnia ordered w PRN dose.  Sleeping improved.  - re consulted psych.  apprec recs.    - delirium could be d/t rising prograf.  Prograf on hold.  Level 14.1 today.  Monitor closely.   - repeat CT head.

## 2018-12-05 NOTE — ASSESSMENT & PLAN NOTE
- DEL for over 2 weeks prior to transplant. Nephrology was consulted.     - Post transplant, Nephrology cont to follow.  He received HD last on 11/30 for metabolic clearance and fluid management.     - Attempted Lasix challenge (160 mg) x1 on 11/28- pt diuresed 105 cc.    - HD 12/3- removed 1.5 liter.  Cont strict I/O's.  Monitor closely.  - Lasix challenge 12/4 with minimal UOP  -Plan for HD today.  Monitor.

## 2018-12-05 NOTE — PROGRESS NOTES
[facility]  Liver Transplant  Progress Note    Patient Name: Femi Enciso  MRN: [id number]  Admission Date: 10/27/2018  Hospital Length of Stay: 39 days  Code Status: Full Code  Primary Care Provider: Primary Doctor No  Post-Operative Day: 24    ORGAN:   LIVER  Disease Etiology: Alcoholic Cirrhosis  Donor Type:    - Brain Death  CDC High Risk:   No  Donor CMV Status:   Donor CMV Status: Positive  Donor HBcAB:   Negative  Donor HCV Status:   Negative  Donor HBV JAVIER: Negative  Donor HCV JAVIER: Negative  Whole or Partial: Whole Liver  Biliary Anastomosis: End to End  Arterial Anatomy: Standard  Subjective:     History of Present Illness:  Femi Enciso is a 53yr old male with PMH of acute ETOH hepatitis and DEL/ATN evolved to ESRD requiring HD (not candidate for KTX). He is now s/p liver transplant (SM induction, DBD, CMV D+/R-). Transplant surgery noted severe collateralization, adrenal gland firmly adhered to liver. Bare area of adrenal gland required several stitches and packing to obtain fair hemostasis (EBL 15L). OR take back  for bleeding from R adrenal bed in AM (significant clot in retroperitoneum, posterior to R hepatic lobe, tract posterior to the R kidney containing significant clot, small bleeding area of retroperitoneal fat) then returned to surgery same day for hemorrhaging closed with wound vac with plans to return to OR 24-48 hours for closure (retroperitoneal /retrorenal/retrocolic hematoma on the right ). Raw retroperitoneal bleeding. Suture ligatures placed, argon beam cautery, evarest placed in retroperitoneum behind cava and right kidney. Significant oozing from upper right adrenal gland, not amenable to ligation, that portion of the adrenal gland was resected with cautery). OR take back  for washout and incisional closure, no significant bleeding or hematoma found. OR cultures   from body fluid grew enterococcus faecium VRE. ID was consulted,  started on  daptomycin for VRE treatment and cefepime/flagyl to cover for IA ty in bile. Patient with significant leukocytosis with peak on 11/22 at 48K prompting drainage of R subphrenic fluid collection, perc drain placed, culture grew VRE. Stroke code called 11/21 for lethargy and unresponsive, CT head without evidence of acute ischemic changes and CTA chest negative, vascular neurology was consulted recommended MRI brain, but patient's AMS improved shortly after event. Nephrology consulted for ESRD requiring HD. Patient overall improved on antibiotic regimen, leukocytosis and AMS improved. He was transferred to [facility] on POD #15.     Hospital Course:  Interval History:  No acute events overnight. Patient sitting in bed. Per family, he was up to chair this morning, ate breakfast. He remains with intermittent confusion but seems to be improved today as compared to yesterday. Responds to questioning with prompting. CT head with no acute findings. Psych consulted, awaiting recs. Infectious work up yesterday 11/4: blood cx NGTD, urine cx pending. CT A/P with no fluid collections in need of draining per surgeon. ID recommends treating R side empyema with Cefepime x 1 week. Remains with minimal UOP despite lasix challenge yesterday. Plan for HD today. Will transfuse 1 unit of PRBC with HD. Monitor.         Scheduled Meds:   sodium chloride 0.9%   Intravenous Once    sodium chloride 0.9%   Intravenous Once    albuterol-ipratropium  3 mL Nebulization Q4H WAKE    ceFEPime (MAXIPIME) IVPB  1 g Intravenous Q24H    ergocalciferol  50,000 Units Oral Q7 Days    famotidine  20 mg Oral QHS    heparin (porcine)  5,000 Units Subcutaneous Q8H    insulin aspart U-100  3 Units Subcutaneous TIDWM    isavuconazonium sulfate  372 mg Oral Daily    lidocaine HCL 10 mg/ml (1%)  1 mL Intradermal Once    linezolid  600 mg Oral Q12H    nystatin  500,000 Units Oral QID (WM & HS)    predniSONE  10 mg Oral Daily    Followed by    [START ON  12/10/2018] predniSONE  5 mg Oral Daily    Followed by    [START ON 12/17/2018] predniSONE  2.5 mg Oral Daily    sodium bicarbonate  650 mg Oral BID    sulfamethoxazole-trimethoprim 400-80mg  1 tablet Oral Every Mon, Wed, Fri    traZODone  50 mg Oral QHS    ursodiol  300 mg Oral BID    valganciclovir 50 mg/ml  100 mg Oral Once per day on Mon Wed Fri     Continuous Infusions:  PRN Meds:sodium chloride, sodium chloride, sodium chloride 0.9%, sodium chloride 0.9%, albuterol-ipratropium, bisacodyl, dextrose 50%, dextrose 50%, glucagon (human recombinant), glucose, glucose, heparin (porcine), insulin aspart U-100, midodrine, ondansetron, tiZANidine, traZODone    Review of Systems   Constitutional: Positive for activity change, appetite change (improving) and fatigue. Negative for fever.   HENT: Negative.  Negative for facial swelling.    Eyes: Negative.    Respiratory: Positive for shortness of breath (w exertion). Negative for apnea, chest tightness and wheezing.    Cardiovascular: Negative.  Negative for chest pain, palpitations and leg swelling.   Gastrointestinal: Positive for abdominal distention and abdominal pain. Negative for constipation, diarrhea, nausea and vomiting.   Genitourinary: Positive for decreased urine volume. Negative for difficulty urinating, dysuria, hematuria and urgency.   Musculoskeletal: Negative.  Negative for back pain, gait problem, neck pain and neck stiffness.   Skin: Positive for wound. Negative for color change and pallor.   Allergic/Immunologic: Positive for immunocompromised state.   Neurological: Positive for weakness. Negative for dizziness, seizures, light-headedness and headaches.   Psychiatric/Behavioral: Positive for decreased concentration and dysphoric mood. Negative for behavioral problems, confusion, hallucinations, sleep disturbance and suicidal ideas. The patient is not nervous/anxious.      Objective:     Vital Signs (Most Recent):  Temp: 98.1 °F (36.7 °C) (12/05/18  1221)  Pulse: 96 (12/05/18 1300)  Resp: 16 (12/05/18 1300)  BP: (!) 127/91 (12/05/18 1300)  SpO2: 97 % (12/05/18 1150) Vital Signs (24h Range):  Temp:  [98.1 °F (36.7 °C)-98.7 °F (37.1 °C)] 98.1 °F (36.7 °C)  Pulse:  [] 96  Resp:  [16-29] 16  SpO2:  [97 %-99 %] 97 %  BP: (109-142)/(74-93) 127/91     Weight: 81 kg (178 lb 9.2 oz)  Body mass index is 25.62 kg/m².    Intake/Output - Last 3 Shifts       12/03 0700 - 12/04 0659 12/04 0700 - 12/05 0659 12/05 0700 - 12/06 0659    P.O. 1520 370     Blood 262.5  250    Other 500      Total Intake(mL/kg) 2282.5 (28.9) 370 (4.6) 250 (3.1)    Urine (mL/kg/hr) 150 (0.1) 200 (0.1)     Emesis/NG output 0      Other 2000      Stool 0 0     Blood       Total Output 2150 200     Net +132.5 +170 +250           Urine Occurrence 0 x      Stool Occurrence 0 x 1 x     Emesis Occurrence 0 x            Physical Exam   Constitutional: He is oriented to person, place, and time. He appears well-developed. No distress.   HENT:   Head: Normocephalic and atraumatic.   White drainage noted to back of throat and roof on mouth, voice hoarse   Eyes: Pupils are equal, round, and reactive to light. Scleral icterus is present.   Neck: Normal range of motion. Neck supple. No JVD present.   Cardiovascular: Normal rate, regular rhythm and normal heart sounds.   No murmur heard.  Pulmonary/Chest: Effort normal. No respiratory distress. He has decreased breath sounds in the right lower field and the left lower field. He has no wheezes. He exhibits no tenderness.   Taking short breaths, IS up to 500   Abdominal: Soft. Bowel sounds are normal. He exhibits no distension. There is tenderness.   Chevron inc ECTOR w/ staples  RLQ JOSE A drain and percutaneous drain site w suture CDI.    Musculoskeletal: Normal range of motion. He exhibits edema. He exhibits no tenderness.   Neurological: He is alert and oriented to person, place, and time. He has normal reflexes.   Skin: Skin is warm and dry. He is not  diaphoretic.   Psychiatric: His affect is labile. He is slowed. Cognition and memory are impaired.   Nursing note and vitals reviewed.      Laboratory:  Immunosuppressants     None        CBC:   Recent Labs   Lab 12/05/18  0630   WBC 9.99   RBC 2.57*   HGB 7.6*   HCT 24.4*      MCV 95   MCH 29.6   MCHC 31.1*     CMP:   Recent Labs   Lab 12/05/18  0630   *   CALCIUM 8.2*   ALBUMIN 1.7*   PROT 4.7*      K 3.8   CO2 24      BUN 38*   CREATININE 2.9*   ALKPHOS 260*   ALT 20   AST 15   BILITOT 2.4*     Coagulation:   Recent Labs   Lab 11/29/18  0703  12/05/18  0629   INR 1.2   < > 1.1   APTT 30.9  --   --     < > = values in this interval not displayed.     Tacrolimus Lvl   Date Value Ref Range Status   12/05/2018 6.7 5.0 - 15.0 ng/mL Final     Comment:     Testing performed by Liquid Chromatography-Tandem  Mass Spectrometry (LC-MS/MS).  This test was developed and its performance characteristics  determined by [facility], Department of Pathology  and Laboratory Medicine in a manner consistent with CLIA  requirements. It has not been cleared or approved by the [place]  Food and Drug Administration.  This test is used for clinical   purposes.  It should not be regarded as investigational or for  research.     12/04/2018 14.1 5.0 - 15.0 ng/mL Final     Comment:     Testing performed by Liquid Chromatography-Tandem  Mass Spectrometry (LC-MS/MS).  This test was developed and its performance characteristics  determined by [facility], Department of Pathology  and Laboratory Medicine in a manner consistent with CLIA  requirements. It has not been cleared or approved by the [place]  Food and Drug Administration.  This test is used for clinical   purposes.  It should not be regarded as investigational or for  research.     12/03/2018 12.3 5.0 - 15.0 ng/mL Final     Comment:     Testing performed by Liquid Chromatography-Tandem  Mass Spectrometry (LC-MS/MS).  This test was developed and its  performance characteristics  determined by [facility], Department of Pathology  and Laboratory Medicine in a manner consistent with CLIA  requirements. It has not been cleared or approved by the [place]  Food and Drug Administration.  This test is used for clinical   purposes.  It should not be regarded as investigational or for  research.     12/02/2018 9.4 5.0 - 15.0 ng/mL Final     Comment:     Testing performed by Liquid Chromatography-Tandem  Mass Spectrometry (LC-MS/MS).  This test was developed and its performance characteristics  determined by [facility], Department of Pathology  and Laboratory Medicine in a manner consistent with CLIA  requirements. It has not been cleared or approved by the [place]  Food and Drug Administration.  This test is used for clinical   purposes.  It should not be regarded as investigational or for  research.           Labs within the past 24 hours have been reviewed.    Diagnostic Results:  None    Assessment/Plan:     * S/P liver transplant    - 53 year old male with history of ESLD 2/2 ETOH cirrhosis who is s/p liver transplant c/b take-back x 3 for bleeding most recently 11/18.  - LFTs now improving slowly.  T bili was 37.6 day of transplant.  - Pt remains with significant debility. Pt will need SNF placement when medically stable.   - Appetite slowly improving.  Tolerating supplements.  Encourage PO intake.   - Drain placed during take back. Drain placed to intra-abdominal abscess on 11/22.   - Fluid from collection noted with enterococcus VRE. Cont with antibxs per ID.  De escalated to PO on 11/29/18.    - Abdominal pain well controlled, and having BMs.    - HD per nephrology.   - Muscle relaxer started and will hold off on oxycodone for now.        Moderate malnutrition    - muscle wasting noted.  Appetite was improving.  Worse over the wkd.  Cont supplements.  Dietician following.  Apprec recs.         Acute renal failure with acute tubular necrosis  superimposed on stage 3 chronic kidney disease    - Renal function slow to recover post-op.   - Nephrology consulted for management.   - Cont with HD as per nephrology recs.  Apprec recs.    - HD on 11/27 w/ 2L off. Pt tolerated well.    - Lasix 160 mg challenge 11/28 w/ minimal output.    - HD performed on 12/3 at bedside. 1.5 removed, tolerated.   - Strict I&Os.      Intra-abdominal abscess    - Significant increase in WBC post-op.   - OR cultures from 11/18 noted with enterococcus VRE.   - Abdominal CT performed at that time with fluid collection and drain placed on 11/22 for drainage.   - Intra-abdominal abscess culture also with enterococcus VRE.   - ID consulted and pt placed on antibxs for treatment. Pt was on Cefepime, Daptomycin, and Fluconazole for treatment.    - Pt remains with RLQ drains (one JOSE A and one Percutaneous).  One JOSE A removed 11/28.  Perc drain in placed draining tan, clear drainage.  WBC slowly improving.   - Liver US on 11/26 reviewed.   - Abdominal CT performed 11/27 and reviewed. Fluid collection noted with improvement on CT.  ID following and reassured by findings.    - Dapto, Cefepime and Flagyl changed 11/30/18 to Linezolid 600 mg PO q 12 hr x 10 days per ID.     - added back cefepime after thora.  ID re consulted.  -Plan to complete Zyvox x 10 days per ID. Monitor.      Long-term use of immunosuppressant medication    - Cont with prograf. Draw prograf level daily and adjust dose as needed to maintain a therapeutic level.        At risk for opportunistic infections    - Cont with bactrim and valcyte for protection against opportunistic infections.   - cont Isavuconazonium.     Leucocytosis    - See intra-abdominal abscess.  - wbc improving, cont w delirium.    -Repeat cx 12/4 with NGTD.  Cont Linezolid and Cefepime per ID.         Delirium    - Pt with intermittent confusion since transplant was improving slowly.   - Pt with some lethargy and confusion on 11/21. Head CT negative.   -  AAOx3. More alert and awake on 11/27.   - AAOx4 on 11/29.  He was happy he could remember what day it is today.    - confusion worse over the wkd.  Seroquel d/c'd 11/30/18.  Trazodone for insomnia ordered w PRN dose.    - re consulted psych, awaiting recommendations.   - delirium could be d/t rising prograf.  - CT head 12/4 with no acute findings.        Prophylactic immunotherapy    - See long term immunosuppression.        Weakness    - Pt remains significantly debilitated.   - PT/OT for strengthening.   - Currently will need SNF for placement and further rehabilitation.        Pleural effusion associated with hepatic disorder    - c/o increased pain w deep breath today to right side.  CXR showed increased opacity in the inferior hemothorax on the right side since 11/26/2018.  Pulse ox 97-99% on RA.  Cont to monitor    - continues to have fluid to RLL.  WBC remains elevated.    - thoracentesis performed on 11/30. Fluid noted with 4k WBC, 93 SEGS. cx no growth to date.  Cefepime added for treatment.  - Chest CT showing right pleural effusion improved, left w increased pleural effusion.   -Per ID, will treat empyema       Type 2 diabetes mellitus without complication    - Endocrine consulted for management. Appreciate recs.        Anemia of chronic disease    - H&H trending down. Will transfuse 1 unit of PRBC with HD. Monitor with daily labs.   -Chest/Abd/pelvis CT 12/4- no fluid to be drainged per transplant surgeon.  No source of bleeding.        DEL (acute kidney injury)    - DEL for over 2 weeks prior to transplant. Nephrology was consulted.     - Post transplant, Nephrology cont to follow.  He received HD last on 11/30 for metabolic clearance and fluid management.     - Attempted Lasix challenge (160 mg) x1 on 11/28- pt diuresed 105 cc.    - HD 12/3- removed 1.5 liter.  Cont strict I/O's.  Monitor closely.  - Lasix challenge 12/4 with minimal UOP  -Plan for HD today.  Monitor.      Hepatorenal syndrome    - DEL  past 2 weeks.  Pt on Midodrine.  Last HD 11/9/18- 0 fluid removed 2/2 hypotension.  - Nephrology following for HD-  Pt cont to have hypotenstion worse w standing.  Midodrine 10 mg PRN for hypotension during HD ordered.           VTE Risk Mitigation (From admission, onward)        Ordered     heparin (porcine) injection 5,000 Units  Every 8 hours      11/30/18 1149     heparin (porcine) injection 1,000 Units  As needed (PRN)      11/25/18 1428     IP VTE HIGH RISK PATIENT  Once      11/11/18 1208          The patients clinical status was discussed at multidisplinary rounds, involving transplant surgery, transplant medicine, pharmacy, nursing, nutrition, and social work    Discharge Planning: Not a candidate for discharge at this time.   No Patient Care Coordination Note on file.      Manda Jackson PA-C  Liver Transplant  [facility]

## 2018-12-05 NOTE — SUBJECTIVE & OBJECTIVE
Scheduled Meds:   sodium chloride 0.9%   Intravenous Once    albuterol-ipratropium  3 mL Nebulization Q4H WAKE    [START ON 12/5/2018] ceFEPime (MAXIPIME) IVPB  1 g Intravenous Q24H    ergocalciferol  50,000 Units Oral Q7 Days    famotidine  20 mg Oral QHS    heparin (porcine)  5,000 Units Subcutaneous Q8H    insulin aspart U-100  3 Units Subcutaneous TIDWM    isavuconazonium sulfate  372 mg Oral Daily    lidocaine HCL 10 mg/ml (1%)  1 mL Intradermal Once    linezolid  600 mg Oral Q12H    nystatin  500,000 Units Oral QID (WM & HS)    predniSONE  10 mg Oral Daily    Followed by    [START ON 12/10/2018] predniSONE  5 mg Oral Daily    Followed by    [START ON 12/17/2018] predniSONE  2.5 mg Oral Daily    sodium bicarbonate  650 mg Oral BID    sulfamethoxazole-trimethoprim 400-80mg  1 tablet Oral Every Mon, Wed, Fri    traZODone  50 mg Oral QHS    ursodiol  300 mg Oral BID    valganciclovir 50 mg/ml  100 mg Oral Once per day on Mon Wed Fri     Continuous Infusions:  PRN Meds:sodium chloride, sodium chloride 0.9%, sodium chloride 0.9%, albuterol-ipratropium, bisacodyl, dextrose 50%, dextrose 50%, glucagon (human recombinant), glucose, glucose, heparin (porcine), insulin aspart U-100, midodrine, ondansetron, tiZANidine, traZODone    Review of Systems   Constitutional: Positive for activity change, appetite change (improving) and fatigue. Negative for fever.   HENT: Negative.  Negative for facial swelling.    Eyes: Negative.    Respiratory: Positive for shortness of breath (w exertion). Negative for apnea, chest tightness and wheezing.    Cardiovascular: Negative.  Negative for chest pain, palpitations and leg swelling.   Gastrointestinal: Positive for abdominal distention and abdominal pain. Negative for constipation, diarrhea, nausea and vomiting.   Genitourinary: Positive for decreased urine volume. Negative for difficulty urinating, dysuria, hematuria and urgency.   Musculoskeletal: Negative.   Negative for back pain, gait problem, neck pain and neck stiffness.   Skin: Positive for wound. Negative for color change and pallor.   Allergic/Immunologic: Positive for immunocompromised state.   Neurological: Positive for weakness. Negative for dizziness, seizures, light-headedness and headaches.   Psychiatric/Behavioral: Positive for decreased concentration and dysphoric mood. Negative for behavioral problems, confusion, hallucinations, sleep disturbance and suicidal ideas. The patient is not nervous/anxious.      Objective:     Vital Signs (Most Recent):  Temp: 98.5 °F (36.9 °C) (12/04/18 1550)  Pulse: 107 (12/04/18 1545)  Resp: (!) 28 (12/04/18 1545)  BP: 116/79 (12/04/18 1545)  SpO2: 98 % (12/04/18 1545) Vital Signs (24h Range):  Temp:  [97.3 °F (36.3 °C)-98.5 °F (36.9 °C)] 98.5 °F (36.9 °C)  Pulse:  [] 107  Resp:  [17-31] 28  SpO2:  [97 %-99 %] 98 %  BP: (107-121)/(67-86) 116/79     Weight: 79 kg (174 lb 2.6 oz)  Body mass index is 24.99 kg/m².    Intake/Output - Last 3 Shifts       12/02 0700 - 12/03 0659 12/03 0700 - 12/04 0659 12/04 0700 - 12/05 0659    P.O. 970 1520 250    Blood 230 262.5     Other  500     Total Intake(mL/kg) 1200 (15.9) 2282.5 (28.9) 250 (3.2)    Urine (mL/kg/hr) 0 (0) 150 (0.1) 200 (0.2)    Emesis/NG output 0 0     Other 0 2000     Stool 0 0     Blood 0      Total Output 0 2150 200    Net +1200 +132.5 +50           Urine Occurrence 0 x 0 x     Stool Occurrence 0 x 0 x     Emesis Occurrence 0 x 0 x           Physical Exam   Constitutional: He is oriented to person, place, and time. He appears well-developed. No distress.   HENT:   Head: Normocephalic and atraumatic.   White drainage noted to back of throat and roof on mouth, voice hoarse   Eyes: Pupils are equal, round, and reactive to light. Scleral icterus is present.   Neck: Normal range of motion. Neck supple. No JVD present.   Cardiovascular: Normal rate, regular rhythm and normal heart sounds.   No murmur  heard.  Pulmonary/Chest: Effort normal. No respiratory distress. He has decreased breath sounds in the right lower field and the left lower field. He has no wheezes. He exhibits no tenderness.   Taking short breaths, IS up to 500   Abdominal: Soft. Bowel sounds are normal. He exhibits no distension. There is tenderness.   Chevron inc ECTOR w/ staples  RLQ JOSE A drain and percutaneous drain site w suture CDI.    Musculoskeletal: Normal range of motion. He exhibits edema. He exhibits no tenderness.   Neurological: He is alert and oriented to person, place, and time. He has normal reflexes.   Skin: Skin is warm and dry. He is not diaphoretic.   Psychiatric: His affect is labile. He is slowed. Cognition and memory are impaired.   Nursing note and vitals reviewed.      Laboratory:  Immunosuppressants     None        CBC:   Recent Labs   Lab 12/04/18  0702   WBC 11.00   RBC 2.73*   HGB 8.2*   HCT 26.4*      MCV 97   MCH 30.0   MCHC 31.1*     CMP:   Recent Labs   Lab 12/04/18  0702   *   CALCIUM 8.8   ALBUMIN 2.2*   PROT 5.4*      K 3.9   CO2 23      BUN 30*   CREATININE 2.4*   ALKPHOS 273*   ALT 21   AST 17   BILITOT 2.7*     Coagulation:   Recent Labs   Lab 11/29/18  0703  12/04/18  0702   INR 1.2   < > 1.1   APTT 30.9  --   --     < > = values in this interval not displayed.     Tacrolimus Lvl   Date Value Ref Range Status   12/04/2018 14.1 5.0 - 15.0 ng/mL Final     Comment:     Testing performed by Liquid Chromatography-Tandem  Mass Spectrometry (LC-MS/MS).  This test was developed and its performance characteristics  determined by [facility], Department of Pathology  and Laboratory Medicine in a manner consistent with CLIA  requirements. It has not been cleared or approved by the [place]  Food and Drug Administration.  This test is used for clinical   purposes.  It should not be regarded as investigational or for  research.     12/03/2018 12.3 5.0 - 15.0 ng/mL Final     Comment:      Testing performed by Liquid Chromatography-Tandem  Mass Spectrometry (LC-MS/MS).  This test was developed and its performance characteristics  determined by [facility], Department of Pathology  and Laboratory Medicine in a manner consistent with CLIA  requirements. It has not been cleared or approved by the [place]  Food and Drug Administration.  This test is used for clinical   purposes.  It should not be regarded as investigational or for  research.     12/02/2018 9.4 5.0 - 15.0 ng/mL Final     Comment:     Testing performed by Liquid Chromatography-Tandem  Mass Spectrometry (LC-MS/MS).  This test was developed and its performance characteristics  determined by [facility], Department of Pathology  and Laboratory Medicine in a manner consistent with CLIA  requirements. It has not been cleared or approved by the [place]  Food and Drug Administration.  This test is used for clinical   purposes.  It should not be regarded as investigational or for  research.     12/01/2018 14.4 5.0 - 15.0 ng/mL Final     Comment:     Testing performed by Liquid Chromatography-Tandem  Mass Spectrometry (LC-MS/MS).  This test was developed and its performance characteristics  determined by [facility], Department of Pathology  and Laboratory Medicine in a manner consistent with CLIA  requirements. It has not been cleared or approved by the [place]  Food and Drug Administration.  This test is used for clinical   purposes.  It should not be regarded as investigational or for  research.           Labs within the past 24 hours have been reviewed.    Diagnostic Results:  None

## 2018-12-05 NOTE — ASSESSMENT & PLAN NOTE
- See intra-abdominal abscess.  - wbc improving, cont w delirium.    -Repeat cx 12/4 with NGTD.  Cont Linezolid and Cefepime per ID.

## 2018-12-05 NOTE — PROGRESS NOTES
[facility]  Infectious Disease  Progress Note    Patient Name: Femi Enciso  MRN: [id number]  Admission Date: 10/27/2018  Length of Stay: 38 days  Attending Physician: Femi Patel MD  Primary Care Provider: Primary Doctor No    Isolation Status: No active isolations  Assessment/Plan:      Delirium    - 52yo male PMHXof DM2 and alcohol dependence with associated hepatitis was admitted on 10/27/2018 with acute liver failure ultimately underwent liver transplantation (s/p DBDLT 11/11/2018, CMV D+/R-, steroid induction, on maintenance tacro/MMF/pred  - c/b post-operative delirium, VRE bacteruria, and IA hemorrhage requiring re-do ex-lap RP/retrorenal/retrocolic hematoma evacuation  - ID was consulted on 11/18 for VRE.faecium infected IA hematoma (with growth from washout cultures).   - Was on linezolid 600mg PO q12h for an additional 10 day (stop date: 12/8/2018)  - on posaconazole for mold prophylaxis per institutional protocol (risk factor: hemorrhage/washout)  - ID called back 12/3/18 for recommendations in setting of acute right sided empyema. S/P thoracentesis WBC 3969 segs 91%  - pleural cultures with NGTD   - recommend to start cefepime will follow cultures to determine if coverage will need adjustment  - remainder of prophylaxis per protocol  - follow-up per primary team  - thank you for consult will sign off, please call with new developments or questions     Recommendations:  - continue cefepime to complete 7 days EOT 12/7/18   - continue linezolid as previously recommended EOT 12/8/18  - continue isavuconazole          Anticipated Disposition:     Thank you for your consult. I will sign off. Please contact us if you have any additional questions.    Solitario Layton MD  Infectious Disease  [facility]    Subjective:     Principal Problem:S/P liver transplant    HPI: No notes on file  Interval History: ID called back to evaluate patient after development of empyema  since last evaluation    Review of Systems    Objective:     Vital Signs (Most Recent):  Temp: 98.5 °F (36.9 °C) (12/04/18 1550)  Pulse: 109 (12/04/18 2011)  Resp: (!) 24 (12/04/18 2011)  BP: 117/76 (12/04/18 2011)  SpO2: 99 % (12/04/18 2011) Vital Signs (24h Range):  Temp:  [98.1 °F (36.7 °C)-98.5 °F (36.9 °C)] 98.5 °F (36.9 °C)  Pulse:  [] 109  Resp:  [17-31] 24  SpO2:  [97 %-99 %] 99 %  BP: (108-121)/(67-86) 117/76     Weight: 79 kg (174 lb 2.6 oz)  Body mass index is 24.99 kg/m².    Estimated Creatinine Clearance: 36.8 mL/min (A) (based on SCr of 2.4 mg/dL (H)).    Physical Exam   Constitutional: He is oriented to person, place, and time. He appears well-developed and well-nourished.   HENT:   Head: Normocephalic and atraumatic.   Eyes: Conjunctivae and EOM are normal. Pupils are equal, round, and reactive to light.   Neck: Normal range of motion. Neck supple.   Cardiovascular: Normal rate, regular rhythm and normal heart sounds.   Pulmonary/Chest: Effort normal. He has rales.   Abdominal: Soft. Bowel sounds are normal. He exhibits no distension. There is no tenderness. There is no rebound.   Musculoskeletal: Normal range of motion. He exhibits no edema, tenderness or deformity.   Neurological: He is alert and oriented to person, place, and time.   Skin: No rash noted. No erythema.       Significant Labs:   Blood Culture:   Recent Labs   Lab 11/14/18  1500 11/21/18  1730 11/21/18  1742 11/30/18  1102 11/30/18  1107   LABBLOO No growth after 5 days. No growth after 5 days. No growth after 5 days. No Growth to date  No Growth to date  No Growth to date  No Growth to date  No Growth to date No Growth to date  No Growth to date  No Growth to date  No Growth to date  No Growth to date     BMP:   Recent Labs   Lab 12/04/18  0702   *      K 3.9      CO2 23   BUN 30*   CREATININE 2.4*   CALCIUM 8.8   MG 2.0     CBC:   Recent Labs   Lab 12/03/18  0630 12/04/18  0702   WBC 11.64 11.00    HGB 7.1* 8.2*   HCT 22.2* 26.4*    200     CMP:   Recent Labs   Lab 12/03/18  0630 12/04/18  0702    140   K 4.7 3.9    107   CO2 22* 23   * 167*   BUN 53* 30*   CREATININE 3.5* 2.4*   CALCIUM 8.0* 8.8   PROT 4.7* 5.4*   ALBUMIN 1.4* 2.2*   BILITOT 2.6* 2.7*   ALKPHOS 261* 273*   AST 17 17   ALT 26 21   ANIONGAP 9 10   EGFRNONAA 18.8* 29.7*     Microbiology Results (last 7 days)     Procedure Component Value Units Date/Time    Urine culture [[id number]] Collected:  12/04/18 1712    Order Status:  Sent Specimen:  Urine, Clean Catch Updated:  12/04/18 1819    Blood culture [[id number]] Collected:  12/04/18 1232    Order Status:  Sent Specimen:  Blood Updated:  12/04/18 1253    Blood culture [[id number]] Collected:  12/04/18 1245    Order Status:  Sent Specimen:  Blood Updated:  12/04/18 1253    Blood culture [[id number]] Collected:  11/30/18 1107    Order Status:  Completed Specimen:  Blood Updated:  12/04/18 1212     Blood Culture, Routine No Growth to date     Blood Culture, Routine No Growth to date     Blood Culture, Routine No Growth to date     Blood Culture, Routine No Growth to date     Blood Culture, Routine No Growth to date    Narrative:       Draw 15 min apart    Blood culture [[id number]] Collected:  11/30/18 1102    Order Status:  Completed Specimen:  Blood Updated:  12/04/18 1212     Blood Culture, Routine No Growth to date     Blood Culture, Routine No Growth to date     Blood Culture, Routine No Growth to date     Blood Culture, Routine No Growth to date     Blood Culture, Routine No Growth to date    Narrative:       Draw 15 min apart    Culture, Anaerobic [[id number]] Collected:  11/30/18 1617    Order Status:  Completed Specimen:  Body Fluid from Pleural Fluid Updated:  12/04/18 1119     Anaerobic Culture Culture in progress    Culture, Body Fluid (Aerobic) w/ GS [[id number]] Collected:  11/30/18 1617    Order Status:  Completed Specimen:  Body Fluid from Pleural Fluid Updated:   12/04/18 1037     AEROBIC CULTURE - FLUID No growth     Gram Stain Result Rare WBC's      No organisms seen    Urine culture [[id number]] Collected:  11/30/18 1201    Order Status:  Completed Specimen:  Urine, Clean Catch Updated:  12/03/18 1455     Urine Culture, Routine --     LANIE GLABRATA  > 100,000 cfu/ml  Treatment of asymptomatic candiduria is not recommended (except for   specific populations). Candida isolated in the urine typically   represents colonization. If an indwelling urinary catheter is present  it should be removed or replaced.      Fungus culture [[id number]] Collected:  11/30/18 1617    Order Status:  Completed Specimen:  Body Fluid from Pleural Fluid Updated:  12/03/18 0949     Fungus (Mycology) Culture Culture in progress    Narrative:       Right    Fungus culture [[id number]] Collected:  11/18/18 1530    Order Status:  Completed Specimen:  Body Fluid from Peritoneal Fluid Updated:  12/03/18 0842     Fungus (Mycology) Culture Culture in progress     Fungus (Mycology) Culture No fungus isolated after 2 weeks    Narrative:       PELVIC FLUID    Fungus culture [[id number]] Collected:  11/18/18 1530    Order Status:  Completed Specimen:  Body Fluid from Abdomen Updated:  12/03/18 0842     Fungus (Mycology) Culture Culture in progress     Fungus (Mycology) Culture No fungus isolated after 2 weeks    Narrative:       Abdominal Fluid    Urine culture [[id number]] Collected:  12/01/18 1530    Order Status:  Completed Specimen:  Urine, Clean Catch Updated:  12/02/18 1958     Urine Culture, Routine No growth    Gram stain [[id number]] Collected:  11/30/18 1617    Order Status:  Canceled Specimen:  Body Fluid from Pleural Fluid Updated:  11/30/18 2331          Significant Imaging: I have reviewed all pertinent imaging results/findings within the past 24 hours.

## 2018-12-05 NOTE — ASSESSMENT & PLAN NOTE
- H&H low over the wkd, transfused 1u PRBC 12/2 w appropriate response.  H/H decreased again today.  Plan to transfuse 1u PRBC.    - stool for occult blood orderd and pending.  Pt LBM 11/29.  No evidence of acute bleed.  Chest/Abd/pelvis CT this am- no fluid to be drainged per transplant surgeon.  No source of bleeding.

## 2018-12-05 NOTE — ASSESSMENT & PLAN NOTE
- Significant increase in WBC post-op.   - OR cultures from 11/18 noted with enterococcus VRE.   - Abdominal CT performed at that time with fluid collection and drain placed on 11/22 for drainage.   - Intra-abdominal abscess culture also with enterococcus VRE.   - ID consulted and pt placed on antibxs for treatment. Pt was on Cefepime, Daptomycin, and Fluconazole for treatment.    - Pt remains with RLQ drains (one JOSE A and one Percutaneous).  One JOSE A removed 11/28.  Perc drain in placed draining tan, clear drainage.  WBC slowly improving.   - Liver US on 11/26 reviewed.   - Abdominal CT performed 11/27 and reviewed. Fluid collection noted with improvement on CT.  ID following and reassured by findings.    - Dapto, Cefepime and Flagyl changed 11/30/18 to Linezolid 600 mg PO q 12 hr x 10 days per ID.     - added back cefepime after thora.  ID re consulted.  -Plan to complete Zyvox x 10 days per ID. Monitor.

## 2018-12-05 NOTE — PROGRESS NOTES
[facility]  Nephrology  Progress Note    Patient Name: Femi Enciso  MRN: [id number]  Admission Date: 10/27/2018  Hospital Length of Stay: 39 days  Attending Provider: Femi Patel MD   Primary Care Physician: Primary Doctor No  Principal Problem:S/P liver transplant    Subjective:     HPI: 52 y/o man with DM2 presents to the ED with family for liver failure (likely due to EtOH abundant history of drinking - diagnosed in Sept 2018 in [place]).  He reports jaundice, generalized weakness, nausea, diarrhea, and decreased appetite since Sept 2018.  He is from [place], [place], and Dr. Sharma (patients physician) recommended bringing him to hospital for evaluation.  Patient denies any fever, chills, vomiting, chest pain, palpitations, SOB, abdominal pain.      Nephrology consulted for evaluation/management Adri.     Interval History: Last iHD on 12/3. Patient given IV Lasix with no adequate response, only 200 mL/24 hr of urine noted since yesterday. sCr and BUN trending upwards, 2.9 and 38 today.   Patient alert this morning, responding to direct verbal commands.     Review of patient's allergies indicates:   Allergen Reactions    Penicillins Nausea And Vomiting and Rash     Full body rash from cefepime as well     Current Facility-Administered Medications   Medication Frequency    0.9%  NaCl infusion (for blood administration) Q24H PRN    0.9%  NaCl infusion (for blood administration) Q24H PRN    0.9%  NaCl infusion PRN    0.9%  NaCl infusion PRN    0.9%  NaCl infusion Once    0.9%  NaCl infusion Once    albuterol-ipratropium 2.5 mg-0.5 mg/3 mL nebulizer solution 3 mL Q4H PRN    albuterol-ipratropium 2.5 mg-0.5 mg/3 mL nebulizer solution 3 mL Q4H WAKE    bisacodyl suppository 10 mg Daily PRN    ceFEPIme injection 1 g Q24H    dextrose 50% injection 12.5 g PRN    dextrose 50% injection 25 g PRN    ergocalciferol capsule 50,000 Units Q7 Days    famotidine tablet 20 mg QHS    glucagon (human  recombinant) injection 1 mg PRN    glucose chewable tablet 16 g PRN    glucose chewable tablet 24 g PRN    heparin (porcine) injection 1,000 Units PRN    heparin (porcine) injection 5,000 Units Q8H    insulin aspart U-100 pen 0-5 Units QID (AC + HS) PRN    insulin aspart U-100 pen 3 Units TIDWM    isavuconazonium sulfate Cap 372 mg Daily    lidocaine HCL 10 mg/ml (1%) injection 1 mL Once    linezolid tablet 600 mg Q12H    midodrine tablet 10 mg TID PRN    nystatin 100,000 unit/mL suspension 500,000 Units QID (WM & HS)    ondansetron injection 4 mg Q6H PRN    predniSONE tablet 10 mg Daily    Followed by    [START ON 12/10/2018] predniSONE tablet 5 mg Daily    Followed by    [START ON 12/17/2018] predniSONE tablet 2.5 mg Daily    sodium bicarbonate tablet 650 mg BID    sulfamethoxazole-trimethoprim 400-80mg per tablet 1 tablet Every Mon, Wed, Fri    tiZANidine tablet 2 mg Q8H PRN    trazodone split tablet 25 mg Nightly PRN    traZODone tablet 50 mg QHS    ursodiol capsule 300 mg BID    valganciclovir 50 mg/ml oral solution 100 mg Once per day on Mon Wed Fri       Objective:     Vital Signs (Most Recent):  Temp: 98.1 °F (36.7 °C) (12/05/18 1221)  Pulse: 95 (12/05/18 1230)  Resp: (!) 24 (12/05/18 1221)  BP: 124/85 (12/05/18 1230)  SpO2: 97 % (12/05/18 1150)  O2 Device (Oxygen Therapy): room air (12/05/18 1230) Vital Signs (24h Range):  Temp:  [98.1 °F (36.7 °C)-98.7 °F (37.1 °C)] 98.1 °F (36.7 °C)  Pulse:  [] 95  Resp:  [16-29] 24  SpO2:  [97 %-99 %] 97 %  BP: (109-142)/(74-91) 124/85     Weight: 81 kg (178 lb 9.2 oz) (12/05/18 0500)  Body mass index is 25.62 kg/m².  Body surface area is 2 meters squared.    I/O last 3 completed shifts:  In: 1910 [P.O.:1410; Other:500]  Out: 2350 [Urine:350; Other:2000]    Physical Exam   Constitutional: He appears well-developed. He is sleeping. He is easily aroused. No distress.   HENT:   Head: Normocephalic and atraumatic.   Neck: Normal range of motion.  No JVD present.   Cardiovascular: Normal rate and regular rhythm. Exam reveals no friction rub.   Pulmonary/Chest: Effort normal and breath sounds normal.   Abdominal: Soft. He exhibits distension.   Musculoskeletal: He exhibits no edema.   Neurological: He is easily aroused.   Answering direct verbal commands   Skin: Skin is warm. He is not diaphoretic.       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   - s/p OHLTx 11/11/2018; intra-op SLED  - remaining oliguric   -hemodynamically stable, off pressors    Plan  -patient with only 200 mL of urine after Lasix given on yesterday. sCr and BUN still trending up, 2.9 and 38 today.   -improved mental status this AM  -will provide dialysis today for metabolic clearance and volume management  -target UF 1-2 L as tolerated by patient   -closely follow strict Is & Os and serial RFPs  -w/u cause for fluctuating mental status, not likely to be related to kidneys               Case discussed with staff further recs with attestation.     I will follow-up with patient. Please contact us if you have any additional questions.    TAMELA Rousseau, YOVANI, APRN, FNP-C  Department of Nephrology  [facility]  Pager: [phone]        ATTENDING PHYSICIAN ATTESTATION  I have personally interviewed and examined the patient. I thoroughly reviewed the  demographic, clinical, laboratorial and imaging information available in medical records. I agree with the assessment and recommendations provided by the ABAD Rousseau who was under my supervision.

## 2018-12-05 NOTE — PROGRESS NOTES
[facility]  Liver Transplant  Progress Note    Patient Name: Fmei Enciso  MRN: [id number]  Admission Date: 10/27/2018  Hospital Length of Stay: 38 days  Code Status: Full Code  Primary Care Provider: Primary Doctor No  Post-Operative Day: 23    ORGAN:   LIVER  Disease Etiology: Alcoholic Cirrhosis  Donor Type:    - Brain Death  CDC High Risk:   No  Donor CMV Status:   Donor CMV Status: Positive  Donor HBcAB:   Negative  Donor HCV Status:   Negative  Donor HBV JAVIER: Negative  Donor HCV JAVIER: Negative  Whole or Partial: Whole Liver  Biliary Anastomosis: End to End  Arterial Anatomy: Standard  Subjective:     History of Present Illness:  Femi Enciso is a 53yr old male with PMH of acute ETOH hepatitis and DEL/ATN evolved to ESRD requiring HD (not candidate for KTX). He is now s/p liver transplant (SM induction, DBD, CMV D+/R-). Transplant surgery noted severe collateralization, adrenal gland firmly adhered to liver. Bare area of adrenal gland required several stitches and packing to obtain fair hemostasis (EBL 15L). OR take back  for bleeding from R adrenal bed in AM (significant clot in retroperitoneum, posterior to R hepatic lobe, tract posterior to the R kidney containing significant clot, small bleeding area of retroperitoneal fat) then returned to surgery same day for hemorrhaging closed with wound vac with plans to return to OR 24-48 hours for closure (retroperitoneal /retrorenal/retrocolic hematoma on the right ). Raw retroperitoneal bleeding. Suture ligatures placed, argon beam cautery, evarest placed in retroperitoneum behind cava and right kidney. Significant oozing from upper right adrenal gland, not amenable to ligation, that portion of the adrenal gland was resected with cautery). OR take back  for washout and incisional closure, no significant bleeding or hematoma found. OR cultures   from body fluid grew enterococcus faecium VRE. ID was consulted,  started on  daptomycin for VRE treatment and cefepime/flagyl to cover for IA ty in bile. Patient with significant leukocytosis with peak on 11/22 at 48K prompting drainage of R subphrenic fluid collection, perc drain placed, culture grew VRE. Stroke code called 11/21 for lethargy and unresponsive, CT head without evidence of acute ischemic changes and CTA chest negative, vascular neurology was consulted recommended MRI brain, but patient's AMS improved shortly after event. Nephrology consulted for ESRD requiring HD. Patient overall improved on antibiotic regimen, leukocytosis and AMS improved. He was transferred to TSU on POD #15.     Hospital Course:  Interval History:  Delirium noted for past few days.  re consulted Psych.  Plan to repeat CT head today.  Last blood cx 11/30 w NGTD, urine cx 12/1 w NGTD,  Repeating cx today.  WBC is trending down.  BP on lower end of normal and HR .  He is afebrile.  Of note, Prograf level rising and could be contributing to delirium.  Prograf on Hold.  He is sleeping better w Trazodone.  Oxy d/c'd.  He is receiving Zanaflex as needed for pain/back spasms.  CT C/A/P this am reviewed. Per txp sx no fluid collections in need of draining at this time.  He had a R thora on 11/30 w 1.5L removed, wbc 3969, segs 93.  Cultures in progress.  He is on Linezolid for VRE and Cefepime added following thora.  ID is following.  CT showed right pleural effusion improved but now w mild left pleural effusion.  Pulse ox 97-99%.  ABGs 12/3 ok, CO2 35.2.  Chevron w staples CDI.  LBM 11/29, supp ordered for today.  If no BM, may need enema tomorrow.  H/H decreased over the wkd and yesterday- responded appropriately to blood transfusion.  Stool for occult blood ordered- no BM to submit sample.  LDH and Haptoglobin wnl.   Pt has had no appetite past few days, wife continues to encourage pt to eat and drink at least 3 supplement drinks per day.  LFTs cont to improve.  Cr level still elevated and requiring HD  intermittently. HD 12/3 at bedside- 1.5 L removed.  Pt tolerated.  Lasix 40 mg given today.  UOP 150cc for day shift.  PT/OT following- he is max assist.  He remains significantly debilitated and appears more cachetic. Monitor.       Scheduled Meds:   sodium chloride 0.9%   Intravenous Once    albuterol-ipratropium  3 mL Nebulization Q4H WAKE    [START ON 12/5/2018] ceFEPime (MAXIPIME) IVPB  1 g Intravenous Q24H    ergocalciferol  50,000 Units Oral Q7 Days    famotidine  20 mg Oral QHS    heparin (porcine)  5,000 Units Subcutaneous Q8H    insulin aspart U-100  3 Units Subcutaneous TIDWM    isavuconazonium sulfate  372 mg Oral Daily    lidocaine HCL 10 mg/ml (1%)  1 mL Intradermal Once    linezolid  600 mg Oral Q12H    nystatin  500,000 Units Oral QID (WM & HS)    predniSONE  10 mg Oral Daily    Followed by    [START ON 12/10/2018] predniSONE  5 mg Oral Daily    Followed by    [START ON 12/17/2018] predniSONE  2.5 mg Oral Daily    sodium bicarbonate  650 mg Oral BID    sulfamethoxazole-trimethoprim 400-80mg  1 tablet Oral Every Mon, Wed, Fri    traZODone  50 mg Oral QHS    ursodiol  300 mg Oral BID    valganciclovir 50 mg/ml  100 mg Oral Once per day on Mon Wed Fri     Continuous Infusions:  PRN Meds:sodium chloride, sodium chloride 0.9%, sodium chloride 0.9%, albuterol-ipratropium, bisacodyl, dextrose 50%, dextrose 50%, glucagon (human recombinant), glucose, glucose, heparin (porcine), insulin aspart U-100, midodrine, ondansetron, tiZANidine, traZODone    Review of Systems   Constitutional: Positive for activity change, appetite change (improving) and fatigue. Negative for fever.   HENT: Negative.  Negative for facial swelling.    Eyes: Negative.    Respiratory: Positive for shortness of breath (w exertion). Negative for apnea, chest tightness and wheezing.    Cardiovascular: Negative.  Negative for chest pain, palpitations and leg swelling.   Gastrointestinal: Positive for abdominal distention  and abdominal pain. Negative for constipation, diarrhea, nausea and vomiting.   Genitourinary: Positive for decreased urine volume. Negative for difficulty urinating, dysuria, hematuria and urgency.   Musculoskeletal: Negative.  Negative for back pain, gait problem, neck pain and neck stiffness.   Skin: Positive for wound. Negative for color change and pallor.   Allergic/Immunologic: Positive for immunocompromised state.   Neurological: Positive for weakness. Negative for dizziness, seizures, light-headedness and headaches.   Psychiatric/Behavioral: Positive for decreased concentration and dysphoric mood. Negative for behavioral problems, confusion, hallucinations, sleep disturbance and suicidal ideas. The patient is not nervous/anxious.      Objective:     Vital Signs (Most Recent):  Temp: 98.5 °F (36.9 °C) (12/04/18 1550)  Pulse: 107 (12/04/18 1545)  Resp: (!) 28 (12/04/18 1545)  BP: 116/79 (12/04/18 1545)  SpO2: 98 % (12/04/18 1545) Vital Signs (24h Range):  Temp:  [97.3 °F (36.3 °C)-98.5 °F (36.9 °C)] 98.5 °F (36.9 °C)  Pulse:  [] 107  Resp:  [17-31] 28  SpO2:  [97 %-99 %] 98 %  BP: (107-121)/(67-86) 116/79     Weight: 79 kg (174 lb 2.6 oz)  Body mass index is 24.99 kg/m².    Intake/Output - Last 3 Shifts       12/02 0700 - 12/03 0659 12/03 0700 - 12/04 0659 12/04 0700 - 12/05 0659    P.O. 970 1520 250    Blood 230 262.5     Other  500     Total Intake(mL/kg) 1200 (15.9) 2282.5 (28.9) 250 (3.2)    Urine (mL/kg/hr) 0 (0) 150 (0.1) 200 (0.2)    Emesis/NG output 0 0     Other 0 2000     Stool 0 0     Blood 0      Total Output 0 2150 200    Net +1200 +132.5 +50           Urine Occurrence 0 x 0 x     Stool Occurrence 0 x 0 x     Emesis Occurrence 0 x 0 x           Physical Exam   Constitutional: He is oriented to person, place, and time. He appears well-developed. No distress.   HENT:   Head: Normocephalic and atraumatic.   White drainage noted to back of throat and roof on mouth, voice hoarse   Eyes: Pupils  are equal, round, and reactive to light. Scleral icterus is present.   Neck: Normal range of motion. Neck supple. No JVD present.   Cardiovascular: Normal rate, regular rhythm and normal heart sounds.   No murmur heard.  Pulmonary/Chest: Effort normal. No respiratory distress. He has decreased breath sounds in the right lower field and the left lower field. He has no wheezes. He exhibits no tenderness.   Taking short breaths, IS up to 500   Abdominal: Soft. Bowel sounds are normal. He exhibits no distension. There is tenderness.   Chevron inc ECTOR w/ staples  RLQ JOSE A drain and percutaneous drain site w suture CDI.    Musculoskeletal: Normal range of motion. He exhibits edema. He exhibits no tenderness.   Neurological: He is alert and oriented to person, place, and time. He has normal reflexes.   Skin: Skin is warm and dry. He is not diaphoretic.   Psychiatric: His affect is labile. He is slowed. Cognition and memory are impaired.   Nursing note and vitals reviewed.      Laboratory:  Immunosuppressants     None        CBC:   Recent Labs   Lab 12/04/18  0702   WBC 11.00   RBC 2.73*   HGB 8.2*   HCT 26.4*      MCV 97   MCH 30.0   MCHC 31.1*     CMP:   Recent Labs   Lab 12/04/18  0702   *   CALCIUM 8.8   ALBUMIN 2.2*   PROT 5.4*      K 3.9   CO2 23      BUN 30*   CREATININE 2.4*   ALKPHOS 273*   ALT 21   AST 17   BILITOT 2.7*     Coagulation:   Recent Labs   Lab 11/29/18  0703  12/04/18  0702   INR 1.2   < > 1.1   APTT 30.9  --   --     < > = values in this interval not displayed.     Tacrolimus Lvl   Date Value Ref Range Status   12/04/2018 14.1 5.0 - 15.0 ng/mL Final     Comment:     Testing performed by Liquid Chromatography-Tandem  Mass Spectrometry (LC-MS/MS).  This test was developed and its performance characteristics  determined by [facility], Department of Pathology  and Laboratory Medicine in a manner consistent with CLIA  requirements. It has not been cleared or approved by  the [place]  Food and Drug Administration.  This test is used for clinical   purposes.  It should not be regarded as investigational or for  research.     12/03/2018 12.3 5.0 - 15.0 ng/mL Final     Comment:     Testing performed by Liquid Chromatography-Tandem  Mass Spectrometry (LC-MS/MS).  This test was developed and its performance characteristics  determined by [facility], Department of Pathology  and Laboratory Medicine in a manner consistent with CLIA  requirements. It has not been cleared or approved by the [place]  Food and Drug Administration.  This test is used for clinical   purposes.  It should not be regarded as investigational or for  research.     12/02/2018 9.4 5.0 - 15.0 ng/mL Final     Comment:     Testing performed by Liquid Chromatography-Tandem  Mass Spectrometry (LC-MS/MS).  This test was developed and its performance characteristics  determined by [facility], Department of Pathology  and Laboratory Medicine in a manner consistent with CLIA  requirements. It has not been cleared or approved by the [place]  Food and Drug Administration.  This test is used for clinical   purposes.  It should not be regarded as investigational or for  research.     12/01/2018 14.4 5.0 - 15.0 ng/mL Final     Comment:     Testing performed by Liquid Chromatography-Tandem  Mass Spectrometry (LC-MS/MS).  This test was developed and its performance characteristics  determined by [facility], Department of Pathology  and Laboratory Medicine in a manner consistent with CLIA  requirements. It has not been cleared or approved by the [place]  Food and Drug Administration.  This test is used for clinical   purposes.  It should not be regarded as investigational or for  research.           Labs within the past 24 hours have been reviewed.    Diagnostic Results:  None    Assessment/Plan:     * S/P liver transplant    - 53 year old male with history of ESLD 2/2 ETOH cirrhosis who is s/p liver transplant c/b  take-back x 3 for bleeding most recently 11/18.  - LFTs now improving slowly.  T bili was 37.6 day of transplant.  - Pt remains with significant debility. Pt will need SNF placement when medically stable.   - Appetite slowly improving.  Tolerating supplements.  Encourage PO intake.   - Drain placed during take back. Drain placed to intra-abdominal abscess on 11/22.   - Fluid from collection noted with enterococcus VRE. Cont with antibxs per ID.  De escalated to PO on 11/29/18.    - Abdominal pain well controlled, and having BMs.    - HD per nephrology. Plan for HD today at bedside- plan to remove up to 1L.   - Muscle relaxer started and will hold off on oxycodone for now.        Moderate malnutrition    - muscle wasting noted.  Appetite was improving.  Worse over the wkd.  Cont supplements.  Dietician following.  Apprec recs.         Acute renal failure with acute tubular necrosis superimposed on stage 3 chronic kidney disease    - Renal function slow to recover post-op.   - Nephrology consulted for management.   - Cont with HD as per nephrology recs.  Apprec recs.    - HD on 11/27 w/ 2L off. Pt tolerated well.    - Lasix 160 mg challenge 11/28 w/ minimal output.    - HD performed on 12/3 at bedside. 1.5 removed, tolerated.   - Strict I&Os.      Intra-abdominal abscess    - Significant increase in WBC post-op.   - OR cultures from 11/18 noted with enterococcus VRE.   - Abdominal CT performed at that time with fluid collection and drain placed on 11/22 for drainage.   - Intra-abdominal abscess culture also with enterococcus VRE.   - ID consulted and pt placed on antibxs for treatment. Pt was on Cefepime, Daptomycin, and Fluconazole for treatment.    - Pt remains with RLQ drains (one JOSE A and one Percutaneous).  One JOSE A removed 11/28.  Perc drain in placed draining tan, clear drainage.  WBC slowly improving.   - Liver US on 11/26 reviewed.   - Abdominal CT performed 11/27 and reviewed. Fluid collection noted with  improvement on CT.  ID following and reassured by findings.    - Dapto, Cefepime and Flagyl changed 11/30/18 to Linezolid 600 mg PO q 12 hr x 10 days per ID.     - added back cefepime after thora.  ID re consulted.     Long-term use of immunosuppressant medication    - Cont with prograf. Draw prograf level daily and adjust dose as needed to maintain a therapeutic level.        At risk for opportunistic infections    - Cont with bactrim and valcyte for protection against opportunistic infections.   - cont Isavuconazonium.     Leucocytosis    - See intra-abdominal abscess.  - wbc improving, cont w delirium.  Repeat cx today.  Cont Linezolid and Cefepime per ID.         Delirium    - Pt with intermittent confusion since transplant was improving slowly.   - Pt with some lethargy and confusion on 11/21. Head CT negative.   - AAOx3. More alert and awake on 11/27.   - AAOx4 on 11/29.  He was happy he could remember what day it is today.    - confusion worse over the wkd.  Seroquel d/c'd 11/30/18.  Trazodone for insomnia ordered w PRN dose.  Sleeping improved.  - re consulted psych.  apprec recs.    - delirium could be d/t rising prograf.  Prograf on hold.  Level 14.1 today.  Monitor closely.   - repeat CT head.       Prophylactic immunotherapy    - See long term immunosuppression.        Weakness    - Pt remains significantly debilitated.   - PT/OT for strengthening.   - Currently will need SNF for placement and further rehabilitation.        Pleural effusion associated with hepatic disorder    - c/o increased pain w deep breath today to right side.  CXR showed increased opacity in the inferior hemothorax on the right side since 11/26/2018.  Pulse ox 97-99% on RA.  Cont to monitor    - continues to have fluid to RLL.  WBC remains elevated.    - thoracentesis performed on 11/30. Fluid noted with 4k WBC, 93 SEGS. cx in progress.  Cefepime added for treatment.  ID consulted to comment on abx.  apprec recs.   - Chest CT  showing right pleural effusion improved, left w increased pleural effusion.  Pulse ox 97-99% on RA.   - ABGs 12/3 ok- CO2 35.2.     Type 2 diabetes mellitus without complication    - Endocrine consulted for management. Appreciate recs.        Anemia of chronic disease    - H&H low over the wkd, transfused 1u PRBC 12/2 w appropriate response.  H/H decreased again today.  Plan to transfuse 1u PRBC.    - stool for occult blood orderd and pending.  Pt LBM 11/29.  No evidence of acute bleed.  Chest/Abd/pelvis CT this am- no fluid to be drainged per transplant surgeon.  No source of bleeding.        DEL (acute kidney injury)    - DEL for over 2 weeks prior to transplant. Nephrology was consulted.     - Post transplant, Nephrology cont to follow.  He received HD last on 11/30 for metabolic clearance and fluid management.     - Attempted Lasix challenge (160 mg) x1 on 11/28- pt diuresed 105 cc.    - HD 12/3- removed 1.5 liter.  Cont strict I/O's.  Monitor closely.  - lasix 40 mg x1 today,  Urinated 150 ml.       Acute renal failure    - DEL past 2 weeks.  Pt on Midodrine.  Last HD 11/9/18- 0 fluid removed 2/2 hypotension.  - Nephrology following for HD-  Pt cont to have hypotenstion worse w standing.  Midodrine 10 mg PRN for hypotension during HD ordered.           VTE Risk Mitigation (From admission, onward)        Ordered     heparin (porcine) injection 5,000 Units  Every 8 hours      11/30/18 1149     heparin (porcine) injection 1,000 Units  As needed (PRN)      11/25/18 1428     IP VTE HIGH RISK PATIENT  Once      11/11/18 1208          The patients clinical status was discussed at multidisplinary rounds, involving transplant surgery, transplant medicine, pharmacy, nursing, nutrition, and social work    Discharge Planning:  No plan for discharge    Hilary Galarza NP  Liver Transplant  [facility]

## 2018-12-05 NOTE — PROGRESS NOTES
"[facility]  Endocrinology  Progress Note    Admit Date: 10/27/2018     Reason for Consult: Management of Hyperglycemia and type 2 DM    Surgical Procedure and Date: liver transplant 18; exploratory lap and liver biopsy on 18      Diabetes diagnosis year: ~ 3-4 years ago     Home Diabetes Medications:  none; previously on metformin 1000 mg BID    How often checking glucose at home? Not checking  BG readings on regimen: n/a  Hypoglycemia on the regimen?  n/a  Missed doses on regimen? n/a    Diabetes Complications include:     DORIAN    Complicating diabetes co morbidities:   CIRRHOSIS and Glucocorticoid use       HPI:   Patient is a 53 y.o. male with a diagnosis of ESLD secondary to ETOH abuse and DEL with ESRD with RRT. Patient is now s/p liver transplant. Endocrinology consulted for BG management.       Lab Results   Component Value Date    HGBA1C 4.4 2018             Interval HPI:   Overnight events:  Remains in TSU. BG at or slightly above goal with scheduled insulin; required correction scale x1 yesterday. Prednisone 10 mg daily; standard steroid taper. Creatinine remains elevated at 2.9; CRRT.   Eatin%   Nausea: No  Hypoglycemia and intervention: No  Fever: No  TPN and/or TF: No      /74   Pulse 103   Temp 98.7 °F (37.1 °C)   Resp 18   Ht 5' 10" (1.778 m)   Wt 81 kg (178 lb 9.2 oz)   SpO2 97%   BMI 25.62 kg/m²      Labs Reviewed and Include    Recent Labs   Lab 18  0630   *   CALCIUM 8.2*   ALBUMIN 1.7*   PROT 4.7*      K 3.8   CO2 24      BUN 38*   CREATININE 2.9*   ALKPHOS 260*   ALT 20   AST 15   BILITOT 2.4*     Lab Results   Component Value Date    WBC 9.99 2018    HGB 7.6 (L) 2018    HCT 24.4 (L) 2018    MCV 95 2018     2018     No results for input(s): TSH, FREET4 in the last 168 hours.  Lab Results   Component Value Date    HGBA1C 4.4 2018       Nutritional status:   Body mass index " is 25.62 kg/m².  Lab Results   Component Value Date    ALBUMIN 1.7 (L) 12/05/2018    ALBUMIN 2.2 (L) 12/04/2018    ALBUMIN 1.4 (L) 12/03/2018     Lab Results   Component Value Date    PREALBUMIN 7 (L) 10/27/2018       Estimated Creatinine Clearance: 30.4 mL/min (A) (based on SCr of 2.9 mg/dL (H)).    Accu-Checks  Recent Labs     12/02/18  2105 12/03/18  0758 12/03/18  1159 12/03/18  1612 12/03/18  2157 12/04/18  0800 12/04/18  1148 12/04/18  1716 12/04/18  2059 12/05/18  0744   POCTGLUCOSE 171* 248* 240* 224* 182* 180* 213* 171* 152* 156*       Current Medications and/or Treatments Impacting Glycemic Control  Immunotherapy:    Immunosuppressants     None        Steroids:   Hormones (From admission, onward)    Start     Stop Route Frequency Ordered    12/17/18 0900  predniSONE tablet 2.5 mg      12/24 0859 Oral Daily 11/26/18 1115    12/10/18 0900  predniSONE tablet 5 mg      12/17 0859 Oral Daily 11/26/18 1115    12/03/18 0900  predniSONE tablet 10 mg      12/10 0859 Oral Daily 11/26/18 1115    11/09/18 1345  methylPREDNISolone sodium succinate injection 500 mg  (Med - Immunosuppression Induction Therapy (Methylprednisolone))      11/10 0144 IV Once pre-op 11/09/18 1236        Pressors:    Autonomic Drugs (From admission, onward)    Start     Stop Route Frequency Ordered    11/30/18 1058  midodrine tablet 10 mg     Question:  Is the patient competent?  Answer:  Yes    -- Oral 3 times daily PRN 11/30/18 0958        Hyperglycemia/Diabetes Medications:   Antihyperglycemics (From admission, onward)    Start     Stop Route Frequency Ordered    12/04/18 1130  insulin aspart U-100 pen 3 Units      -- SubQ 3 times daily with meals 12/04/18 1059    11/27/18 1122  insulin aspart U-100 pen 0-5 Units      -- SubQ Before meals & nightly PRN 11/27/18 1023          ASSESSMENT and PLAN    * S/P liver transplant    Managed per LTS.   avoid hypoglycemia  Optimize BG control for surgical wound healing.     Lab Results   Component  Value Date    ALT 20 12/05/2018    AST 15 12/05/2018     (H) 11/26/2018    ALKPHOS 260 (H) 12/05/2018    BILITOT 2.4 (H) 12/05/2018            Type 2 diabetes mellitus without complication    BG goal 140-180    continue Novolog 3 units with meals  Low dose correction scale given kidney function  BG monitoring AC/HS    Discharge plans- TBD     Adrenal cortical steroids causing adverse effect in therapeutic use    On standard steroid taper per transplant team; may elevate BG readings         DEL (acute kidney injury)    Avoid insulin stacking and hypoglycemia.  Nephrology following  Lab Results   Component Value Date    CREATININE 2.9 (H) 12/05/2018            Prophylactic immunotherapy    May increase insulin resistance.            Tessa Falk NP  Endocrinology  [facility]

## 2018-12-05 NOTE — PROGRESS NOTES
Hemodialysis x 3 hours complete. 2.375 liter net fluid removal. Blood returned without difficulty. Each port of cvc flushed with normal saline and capped. 1u prbc transfused during HD.

## 2018-12-05 NOTE — ASSESSMENT & PLAN NOTE
- H&H trending down. Will transfuse 1 unit of PRBC with HD. Monitor with daily labs.   -Chest/Abd/pelvis CT 12/4- no fluid to be drainged per transplant surgeon.  No source of bleeding.

## 2018-12-05 NOTE — SUBJECTIVE & OBJECTIVE
Interval History: Last iHD on 12/3. Patient given IV Lasix with no adequate response, only 200 mL/24 hr of urine noted since yesterday. sCr and BUN trending upwards, 2.9 and 38 today.   Patient alert this morning, responding to direct verbal commands.     Review of patient's allergies indicates:   Allergen Reactions    Penicillins Nausea And Vomiting and Rash     Full body rash from cefepime as well     Current Facility-Administered Medications   Medication Frequency    0.9%  NaCl infusion (for blood administration) Q24H PRN    0.9%  NaCl infusion (for blood administration) Q24H PRN    0.9%  NaCl infusion PRN    0.9%  NaCl infusion PRN    0.9%  NaCl infusion Once    0.9%  NaCl infusion Once    albuterol-ipratropium 2.5 mg-0.5 mg/3 mL nebulizer solution 3 mL Q4H PRN    albuterol-ipratropium 2.5 mg-0.5 mg/3 mL nebulizer solution 3 mL Q4H WAKE    bisacodyl suppository 10 mg Daily PRN    ceFEPIme injection 1 g Q24H    dextrose 50% injection 12.5 g PRN    dextrose 50% injection 25 g PRN    ergocalciferol capsule 50,000 Units Q7 Days    famotidine tablet 20 mg QHS    glucagon (human recombinant) injection 1 mg PRN    glucose chewable tablet 16 g PRN    glucose chewable tablet 24 g PRN    heparin (porcine) injection 1,000 Units PRN    heparin (porcine) injection 5,000 Units Q8H    insulin aspart U-100 pen 0-5 Units QID (AC + HS) PRN    insulin aspart U-100 pen 3 Units TIDWM    isavuconazonium sulfate Cap 372 mg Daily    lidocaine HCL 10 mg/ml (1%) injection 1 mL Once    linezolid tablet 600 mg Q12H    midodrine tablet 10 mg TID PRN    nystatin 100,000 unit/mL suspension 500,000 Units QID (WM & HS)    ondansetron injection 4 mg Q6H PRN    predniSONE tablet 10 mg Daily    Followed by    [START ON 12/10/2018] predniSONE tablet 5 mg Daily    Followed by    [START ON 12/17/2018] predniSONE tablet 2.5 mg Daily    sodium bicarbonate tablet 650 mg BID    sulfamethoxazole-trimethoprim 400-80mg per  tablet 1 tablet Every Mon, Wed, Fri    tiZANidine tablet 2 mg Q8H PRN    trazodone split tablet 25 mg Nightly PRN    traZODone tablet 50 mg QHS    ursodiol capsule 300 mg BID    valganciclovir 50 mg/ml oral solution 100 mg Once per day on Mon Wed Fri       Objective:     Vital Signs (Most Recent):  Temp: 98.1 °F (36.7 °C) (12/05/18 1221)  Pulse: 95 (12/05/18 1230)  Resp: (!) 24 (12/05/18 1221)  BP: 124/85 (12/05/18 1230)  SpO2: 97 % (12/05/18 1150)  O2 Device (Oxygen Therapy): room air (12/05/18 1230) Vital Signs (24h Range):  Temp:  [98.1 °F (36.7 °C)-98.7 °F (37.1 °C)] 98.1 °F (36.7 °C)  Pulse:  [] 95  Resp:  [16-29] 24  SpO2:  [97 %-99 %] 97 %  BP: (109-142)/(74-91) 124/85     Weight: 81 kg (178 lb 9.2 oz) (12/05/18 0500)  Body mass index is 25.62 kg/m².  Body surface area is 2 meters squared.    I/O last 3 completed shifts:  In: 1910 [P.O.:1410; Other:500]  Out: 2350 [Urine:350; Other:2000]    Physical Exam   Constitutional: He appears well-developed. He is sleeping. He is easily aroused. No distress.   HENT:   Head: Normocephalic and atraumatic.   Neck: Normal range of motion. No JVD present.   Cardiovascular: Normal rate and regular rhythm. Exam reveals no friction rub.   Pulmonary/Chest: Effort normal and breath sounds normal.   Abdominal: Soft. He exhibits distension.   Musculoskeletal: He exhibits no edema.   Neurological: He is easily aroused.   Answering direct verbal commands   Skin: Skin is warm. He is not diaphoretic.       Significant Labs:  CBC:   Recent Labs   Lab 12/05/18  0630   WBC 9.99   RBC 2.57*   HGB 7.6*   HCT 24.4*      MCV 95   MCH 29.6   MCHC 31.1*     CMP:   Recent Labs   Lab 12/05/18  0630   *   CALCIUM 8.2*   ALBUMIN 1.7*   PROT 4.7*      K 3.8   CO2 24      BUN 38*   CREATININE 2.9*   ALKPHOS 260*   ALT 20   AST 15   BILITOT 2.4*

## 2018-12-06 LAB
ALBUMIN SERPL BCP-MCNC: 1.6 G/DL
ALP SERPL-CCNC: 259 U/L
ALT SERPL W/O P-5'-P-CCNC: 19 U/L
ANION GAP SERPL CALC-SCNC: 7 MMOL/L
AST SERPL-CCNC: 15 U/L
BASOPHILS # BLD AUTO: 0.22 K/UL
BASOPHILS NFR BLD: 2.3 %
BILIRUB SERPL-MCNC: 2.4 MG/DL
BUN SERPL-MCNC: 26 MG/DL
CALCIUM SERPL-MCNC: 8.1 MG/DL
CHLORIDE SERPL-SCNC: 109 MMOL/L
CO2 SERPL-SCNC: 24 MMOL/L
CREAT SERPL-MCNC: 2.3 MG/DL
DIFFERENTIAL METHOD: ABNORMAL
EOSINOPHIL # BLD AUTO: 0.1 K/UL
EOSINOPHIL NFR BLD: 1 %
ERYTHROCYTE [DISTWIDTH] IN BLOOD BY AUTOMATED COUNT: 17.3 %
EST. GFR  (AFRICAN AMERICAN): 36.1 ML/MIN/1.73 M^2
EST. GFR  (NON AFRICAN AMERICAN): 31.3 ML/MIN/1.73 M^2
GLUCOSE SERPL-MCNC: 135 MG/DL
HCT VFR BLD AUTO: 30.2 %
HGB BLD-MCNC: 9.7 G/DL
IMM GRANULOCYTES # BLD AUTO: 0.08 K/UL
IMM GRANULOCYTES NFR BLD AUTO: 0.8 %
INR PPP: 1.1
LYMPHOCYTES # BLD AUTO: 0.5 K/UL
LYMPHOCYTES NFR BLD: 5.4 %
MAGNESIUM SERPL-MCNC: 1.8 MG/DL
MCH RBC QN AUTO: 30.5 PG
MCHC RBC AUTO-ENTMCNC: 32.1 G/DL
MCV RBC AUTO: 95 FL
MONOCYTES # BLD AUTO: 0.5 K/UL
MONOCYTES NFR BLD: 5.3 %
NEUTROPHILS # BLD AUTO: 8.2 K/UL
NEUTROPHILS NFR BLD: 85.2 %
NRBC BLD-RTO: 0 /100 WBC
OB PNL STL: NEGATIVE
PHOSPHATE SERPL-MCNC: 3.3 MG/DL
PLATELET # BLD AUTO: 166 K/UL
PMV BLD AUTO: 11.7 FL
POCT GLUCOSE: 109 MG/DL (ref 70–110)
POCT GLUCOSE: 138 MG/DL (ref 70–110)
POCT GLUCOSE: 148 MG/DL (ref 70–110)
POCT GLUCOSE: 167 MG/DL (ref 70–110)
POTASSIUM SERPL-SCNC: 3.6 MMOL/L
PROT SERPL-MCNC: 5 G/DL
PROTHROMBIN TIME: 11.7 SEC
RBC # BLD AUTO: 3.18 M/UL
SODIUM SERPL-SCNC: 140 MMOL/L
TACROLIMUS BLD-MCNC: 3.3 NG/ML
WBC # BLD AUTO: 9.63 K/UL

## 2018-12-06 PROCEDURE — 27000646 HC AEROBIKA DEVICE

## 2018-12-06 PROCEDURE — 20600001 HC STEP DOWN PRIVATE ROOM

## 2018-12-06 PROCEDURE — 94761 N-INVAS EAR/PLS OXIMETRY MLT: CPT

## 2018-12-06 PROCEDURE — 25000003 PHARM REV CODE 250: Performed by: STUDENT IN AN ORGANIZED HEALTH CARE EDUCATION/TRAINING PROGRAM

## 2018-12-06 PROCEDURE — 27000221 HC OXYGEN, UP TO 24 HOURS

## 2018-12-06 PROCEDURE — 25000003 PHARM REV CODE 250: Performed by: NURSE PRACTITIONER

## 2018-12-06 PROCEDURE — 63600175 PHARM REV CODE 636 W HCPCS: Performed by: NURSE PRACTITIONER

## 2018-12-06 PROCEDURE — 93010 EKG 12-LEAD: ICD-10-PCS | Mod: ,,, | Performed by: INTERNAL MEDICINE

## 2018-12-06 PROCEDURE — 99233 PR SUBSEQUENT HOSPITAL CARE,LEVL III: ICD-10-PCS | Mod: ,,, | Performed by: NURSE PRACTITIONER

## 2018-12-06 PROCEDURE — 99900035 HC TECH TIME PER 15 MIN (STAT)

## 2018-12-06 PROCEDURE — 94640 AIRWAY INHALATION TREATMENT: CPT

## 2018-12-06 PROCEDURE — 85610 PROTHROMBIN TIME: CPT

## 2018-12-06 PROCEDURE — 93010 ELECTROCARDIOGRAM REPORT: CPT | Mod: ,,, | Performed by: INTERNAL MEDICINE

## 2018-12-06 PROCEDURE — 93005 ELECTROCARDIOGRAM TRACING: CPT

## 2018-12-06 PROCEDURE — 63600175 PHARM REV CODE 636 W HCPCS: Performed by: STUDENT IN AN ORGANIZED HEALTH CARE EDUCATION/TRAINING PROGRAM

## 2018-12-06 PROCEDURE — 85025 COMPLETE CBC W/AUTO DIFF WBC: CPT

## 2018-12-06 PROCEDURE — 90792 PR PSYCHIATRIC DIAGNOSTIC EVALUATION W/MEDICAL SERVICES: ICD-10-PCS | Mod: ,,, | Performed by: PSYCHIATRY & NEUROLOGY

## 2018-12-06 PROCEDURE — 84100 ASSAY OF PHOSPHORUS: CPT

## 2018-12-06 PROCEDURE — 80197 ASSAY OF TACROLIMUS: CPT

## 2018-12-06 PROCEDURE — 25000242 PHARM REV CODE 250 ALT 637 W/ HCPCS: Performed by: NURSE PRACTITIONER

## 2018-12-06 PROCEDURE — 94664 DEMO&/EVAL PT USE INHALER: CPT

## 2018-12-06 PROCEDURE — 63600175 PHARM REV CODE 636 W HCPCS: Performed by: PHYSICIAN ASSISTANT

## 2018-12-06 PROCEDURE — 90792 PSYCH DIAG EVAL W/MED SRVCS: CPT | Mod: ,,, | Performed by: PSYCHIATRY & NEUROLOGY

## 2018-12-06 PROCEDURE — 99233 SBSQ HOSP IP/OBS HIGH 50: CPT | Mod: ,,, | Performed by: NURSE PRACTITIONER

## 2018-12-06 PROCEDURE — 80053 COMPREHEN METABOLIC PANEL: CPT

## 2018-12-06 PROCEDURE — P9047 ALBUMIN (HUMAN), 25%, 50ML: HCPCS | Mod: JG | Performed by: NURSE PRACTITIONER

## 2018-12-06 PROCEDURE — 83735 ASSAY OF MAGNESIUM: CPT

## 2018-12-06 PROCEDURE — 82272 OCCULT BLD FECES 1-3 TESTS: CPT

## 2018-12-06 RX ORDER — FUROSEMIDE 10 MG/ML
100 INJECTION INTRAMUSCULAR; INTRAVENOUS ONCE
Status: COMPLETED | OUTPATIENT
Start: 2018-12-06 | End: 2018-12-06

## 2018-12-06 RX ORDER — FUROSEMIDE 10 MG/ML
100 INJECTION INTRAMUSCULAR; INTRAVENOUS ONCE
Status: DISCONTINUED | OUTPATIENT
Start: 2018-12-06 | End: 2018-12-06

## 2018-12-06 RX ORDER — OLANZAPINE 2.5 MG/1
2.5 TABLET ORAL EVERY 6 HOURS PRN
Status: CANCELLED | OUTPATIENT
Start: 2018-12-06

## 2018-12-06 RX ORDER — ALBUMIN HUMAN 250 G/1000ML
25 SOLUTION INTRAVENOUS EVERY 6 HOURS
Status: COMPLETED | OUTPATIENT
Start: 2018-12-06 | End: 2018-12-06

## 2018-12-06 RX ORDER — TACROLIMUS 0.5 MG/1
0.5 CAPSULE ORAL 2 TIMES DAILY
Status: DISCONTINUED | OUTPATIENT
Start: 2018-12-06 | End: 2018-12-07

## 2018-12-06 RX ORDER — FUROSEMIDE 10 MG/ML
100 INJECTION INTRAMUSCULAR; INTRAVENOUS ONCE
Status: CANCELLED | OUTPATIENT
Start: 2018-12-06

## 2018-12-06 RX ORDER — ALBUMIN HUMAN 250 G/1000ML
25 SOLUTION INTRAVENOUS EVERY 8 HOURS
Status: CANCELLED | OUTPATIENT
Start: 2018-12-06 | End: 2018-12-07

## 2018-12-06 RX ORDER — RAMELTEON 8 MG/1
8 TABLET ORAL NIGHTLY PRN
Status: CANCELLED | OUTPATIENT
Start: 2018-12-06

## 2018-12-06 RX ADMIN — IPRATROPIUM BROMIDE AND ALBUTEROL SULFATE 3 ML: .5; 3 SOLUTION RESPIRATORY (INHALATION) at 07:12

## 2018-12-06 RX ADMIN — ALBUMIN HUMAN 25 G: 0.25 SOLUTION INTRAVENOUS at 10:12

## 2018-12-06 RX ADMIN — LINEZOLID 600 MG: 600 TABLET, FILM COATED ORAL at 08:12

## 2018-12-06 RX ADMIN — NYSTATIN 500000 UNITS: 500000 SUSPENSION ORAL at 05:12

## 2018-12-06 RX ADMIN — TRAZODONE HYDROCHLORIDE 25 MG: 50 TABLET ORAL at 08:12

## 2018-12-06 RX ADMIN — ALBUMIN HUMAN 25 G: 0.25 SOLUTION INTRAVENOUS at 05:12

## 2018-12-06 RX ADMIN — ALBUMIN HUMAN 25 G: 0.25 SOLUTION INTRAVENOUS at 11:12

## 2018-12-06 RX ADMIN — NYSTATIN 500000 UNITS: 500000 SUSPENSION ORAL at 08:12

## 2018-12-06 RX ADMIN — SODIUM BICARBONATE 650 MG TABLET 650 MG: at 05:12

## 2018-12-06 RX ADMIN — CEFEPIME 1 G: 1 INJECTION, POWDER, FOR SOLUTION INTRAMUSCULAR; INTRAVENOUS at 08:12

## 2018-12-06 RX ADMIN — PREDNISONE 10 MG: 5 TABLET ORAL at 08:12

## 2018-12-06 RX ADMIN — IPRATROPIUM BROMIDE AND ALBUTEROL SULFATE 3 ML: .5; 3 SOLUTION RESPIRATORY (INHALATION) at 11:12

## 2018-12-06 RX ADMIN — ERGOCALCIFEROL 50000 UNITS: 1.25 CAPSULE ORAL at 08:12

## 2018-12-06 RX ADMIN — HEPARIN SODIUM 5000 UNITS: 5000 INJECTION, SOLUTION INTRAVENOUS; SUBCUTANEOUS at 08:12

## 2018-12-06 RX ADMIN — INSULIN ASPART 3 UNITS: 100 INJECTION, SOLUTION INTRAVENOUS; SUBCUTANEOUS at 08:12

## 2018-12-06 RX ADMIN — HEPARIN SODIUM 5000 UNITS: 5000 INJECTION, SOLUTION INTRAVENOUS; SUBCUTANEOUS at 04:12

## 2018-12-06 RX ADMIN — TACROLIMUS 0.5 MG: 0.5 CAPSULE ORAL at 05:12

## 2018-12-06 RX ADMIN — IPRATROPIUM BROMIDE AND ALBUTEROL SULFATE 3 ML: .5; 3 SOLUTION RESPIRATORY (INHALATION) at 04:12

## 2018-12-06 RX ADMIN — URSODIOL 300 MG: 300 CAPSULE ORAL at 08:12

## 2018-12-06 RX ADMIN — SODIUM BICARBONATE 650 MG TABLET 650 MG: at 08:12

## 2018-12-06 RX ADMIN — INSULIN ASPART 3 UNITS: 100 INJECTION, SOLUTION INTRAVENOUS; SUBCUTANEOUS at 05:12

## 2018-12-06 RX ADMIN — INSULIN ASPART 3 UNITS: 100 INJECTION, SOLUTION INTRAVENOUS; SUBCUTANEOUS at 11:12

## 2018-12-06 RX ADMIN — TIZANIDINE 2 MG: 2 TABLET ORAL at 08:12

## 2018-12-06 RX ADMIN — FAMOTIDINE 20 MG: 20 TABLET ORAL at 08:12

## 2018-12-06 RX ADMIN — NYSTATIN 500000 UNITS: 500000 SUSPENSION ORAL at 11:12

## 2018-12-06 RX ADMIN — FUROSEMIDE 100 MG: 10 INJECTION, SOLUTION INTRAMUSCULAR; INTRAVENOUS at 11:12

## 2018-12-06 RX ADMIN — ISAVUCONAZONIUM SULFATE 372 MG: 186 CAPSULE ORAL at 08:12
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
## 2018-12-06 NOTE — ASSESSMENT & PLAN NOTE
Avoid insulin stacking and hypoglycemia.  Nephrology following  Lab Results   Component Value Date    CREATININE 2.3 (H) 12/06/2018

## 2018-12-06 NOTE — ASSESSMENT & PLAN NOTE
- See intra-abdominal abscess.  - wbc improving, cont w delirium.    - Repeat cx 12/4 with NGTD.  Cont Linezolid and Cefepime per ID.

## 2018-12-06 NOTE — SUBJECTIVE & OBJECTIVE
Scheduled Meds:   albumin human 25%  25 g Intravenous Q6H    albuterol-ipratropium  3 mL Nebulization Q4H WAKE    ceFEPime (MAXIPIME) IVPB  1 g Intravenous Q24H    ergocalciferol  50,000 Units Oral Q7 Days    famotidine  20 mg Oral QHS    heparin (porcine)  5,000 Units Subcutaneous Q8H    insulin aspart U-100  3 Units Subcutaneous TIDWM    isavuconazonium sulfate  372 mg Oral Daily    lidocaine HCL 10 mg/ml (1%)  1 mL Intradermal Once    linezolid  600 mg Oral Q12H    nystatin  500,000 Units Oral QID (WM & HS)    predniSONE  10 mg Oral Daily    Followed by    [START ON 12/10/2018] predniSONE  5 mg Oral Daily    Followed by    [START ON 12/17/2018] predniSONE  2.5 mg Oral Daily    sodium bicarbonate  650 mg Oral BID    sulfamethoxazole-trimethoprim 400-80mg  1 tablet Oral Every Mon, Wed, Fri    traZODone  25 mg Oral QHS    ursodiol  300 mg Oral BID    valganciclovir 50 mg/ml  100 mg Oral Once per day on Mon Wed Fri     Continuous Infusions:  PRN Meds:albuterol-ipratropium, bisacodyl, dextrose 50%, dextrose 50%, glucagon (human recombinant), glucose, glucose, heparin (porcine), insulin aspart U-100, midodrine, ondansetron, tiZANidine, traZODone    Review of Systems   Constitutional: Positive for activity change, appetite change (poor) and fatigue. Negative for fever.   HENT: Positive for voice change. Negative for facial swelling.    Eyes: Negative.    Respiratory: Positive for cough ( nonproductive ) and shortness of breath (w exertion). Negative for apnea, choking, chest tightness and wheezing.    Cardiovascular: Negative.  Negative for chest pain, palpitations and leg swelling.   Gastrointestinal: Positive for abdominal distention and abdominal pain. Negative for constipation, diarrhea, nausea and vomiting.   Genitourinary: Positive for decreased urine volume. Negative for difficulty urinating, dysuria, hematuria and urgency.   Musculoskeletal: Negative.  Negative for back pain, gait problem,  neck pain and neck stiffness.   Skin: Positive for wound. Negative for color change and pallor.   Allergic/Immunologic: Positive for immunocompromised state.   Neurological: Positive for weakness. Negative for dizziness, seizures, light-headedness and headaches.   Psychiatric/Behavioral: Positive for decreased concentration and dysphoric mood. Negative for behavioral problems, confusion, hallucinations, sleep disturbance and suicidal ideas. The patient is not nervous/anxious.      Objective:     Vital Signs (Most Recent):  Temp: 98.2 °F (36.8 °C) (12/06/18 1153)  Pulse: 102 (12/06/18 1118)  Resp: 18 (12/06/18 1118)  BP: 103/73 (12/06/18 0728)  SpO2: 98 % (12/06/18 1118) Vital Signs (24h Range):  Temp:  [98 °F (36.7 °C)-98.3 °F (36.8 °C)] 98.2 °F (36.8 °C)  Pulse:  [] 102  Resp:  [18-27] 18  SpO2:  [96 %-100 %] 98 %  BP: (101-125)/(71-92) 103/73     Weight: 77.9 kg (171 lb 11.8 oz)  Body mass index is 24.64 kg/m².    Intake/Output - Last 3 Shifts       12/04 0700 - 12/05 0659 12/05 0700 - 12/06 0659 12/06 0700 - 12/07 0659    P.O. 370 800     Blood  250     Other  750     Total Intake(mL/kg) 370 (4.6) 1800 (23.1)     Urine (mL/kg/hr) 200 (0.1) 0 (0)     Emesis/NG output  0     Other  3125     Stool 0 0     Blood  0     Total Output 200 3125     Net +170 -1325            Urine Occurrence  0 x     Stool Occurrence 1 x 2 x 1 x    Emesis Occurrence  0 x           Physical Exam   Constitutional: He is oriented to person, place, and time. He appears well-developed. No distress.   HENT:   Head: Normocephalic and atraumatic.   White drainage noted to back of throat and roof on mouth, voice hoarse   Eyes: Pupils are equal, round, and reactive to light. Scleral icterus is present.   Neck: Normal range of motion. Neck supple. No JVD present.   Cardiovascular: Normal rate, regular rhythm and normal heart sounds.   No murmur heard.  Pulmonary/Chest: Effort normal. No respiratory distress. He has decreased breath sounds in  the right lower field and the left lower field. He has no wheezes. He exhibits no tenderness.   Taking short breaths, IS up to 500   Abdominal: Soft. Bowel sounds are normal. He exhibits no distension. There is tenderness.   Chevron inc ECTOR w/ staples  RLQ JOSE A drain and percutaneous drain site w suture CDI.    Musculoskeletal: Normal range of motion. He exhibits no tenderness. Edema: 2+ BLE.   Neurological: He is alert and oriented to person, place, and time. He has normal reflexes.   Skin: Skin is warm and dry. Capillary refill takes 2 to 3 seconds. He is not diaphoretic.   Psychiatric: His affect is labile. He is slowed. Cognition and memory are impaired.   Nursing note and vitals reviewed.      Laboratory:  Immunosuppressants     None        CBC:   Recent Labs   Lab 12/06/18 0621   WBC 9.63   RBC 3.18*   HGB 9.7*   HCT 30.2*      MCV 95   MCH 30.5   MCHC 32.1     CMP:   Recent Labs   Lab 12/06/18 0621   *   CALCIUM 8.1*   ALBUMIN 1.6*   PROT 5.0*      K 3.6   CO2 24      BUN 26*   CREATININE 2.3*   ALKPHOS 259*   ALT 19   AST 15   BILITOT 2.4*     Coagulation:   Recent Labs   Lab 12/06/18 0621   INR 1.1     Tacrolimus Lvl   Date Value Ref Range Status   12/06/2018 3.3 (L) 5.0 - 15.0 ng/mL Final     Comment:     Testing performed by Liquid Chromatography-Tandem  Mass Spectrometry (LC-MS/MS).  This test was developed and its performance characteristics  determined by [facility], Department of Pathology  and Laboratory Medicine in a manner consistent with CLIA  requirements. It has not been cleared or approved by the [place]  Food and Drug Administration.  This test is used for clinical   purposes.  It should not be regarded as investigational or for  research.     12/05/2018 6.7 5.0 - 15.0 ng/mL Final     Comment:     Testing performed by Liquid Chromatography-Tandem  Mass Spectrometry (LC-MS/MS).  This test was developed and its performance characteristics  determined by [facility], Department of Pathology  and Laboratory Medicine in a manner consistent with CLIA  requirements. It has not been cleared or approved by the [place]  Food and Drug Administration.  This test is used for clinical   purposes.  It should not be regarded as investigational or for  research.     12/04/2018 14.1 5.0 - 15.0 ng/mL Final     Comment:     Testing performed by Liquid Chromatography-Tandem  Mass Spectrometry (LC-MS/MS).  This test was developed and its performance characteristics  determined by [facility], Department of Pathology  and Laboratory Medicine in a manner consistent with CLIA  requirements. It has not been cleared or approved by the [place]  Food and Drug Administration.  This test is used for clinical   purposes.  It should not be regarded as investigational or for  research.     12/03/2018 12.3 5.0 - 15.0 ng/mL Final     Comment:     Testing performed by Liquid Chromatography-Tandem  Mass Spectrometry (LC-MS/MS).  This test was developed and its performance characteristics  determined by [facility], Department of Pathology  and Laboratory Medicine in a manner consistent with CLIA  requirements. It has not been cleared or approved by the [place]  Food and Drug Administration.  This test is used for clinical   purposes.  It should not be regarded as investigational or for  research.           Labs within the past 24 hours have been reviewed.    Diagnostic Results:  None

## 2018-12-06 NOTE — ASSESSMENT & PLAN NOTE
- DEL for over 2 weeks prior to transplant. Nephrology was consulted.     - Post transplant, Nephrology cont to follow.  He received HD last on 11/30 for metabolic clearance and fluid management.     - Attempted Lasix challenge (160 mg) x1 on 11/28- pt diuresed 105 cc.    - HD 12/3- removed 1.5 liter.  Cont strict I/O's.  Monitor closely.  - Lasix challenge 12/4 with minimal UOP  - Last HD 12/5- 2.3L removed.  Still w crackles to mell bases and dependent edema.  Lasix 100mg and albumin x3 today.  Monitor.

## 2018-12-06 NOTE — SUBJECTIVE & OBJECTIVE
Interval History: Tolerated iHD well on yesterday, UF 2.3 L. Patient anuric today. No signs of volume overload. Electrolytes and acid/base stable. BP stable. No clinical signs of uremia on assessment this morning.       Review of patient's allergies indicates:   Allergen Reactions    Penicillins Nausea And Vomiting and Rash     Full body rash from cefepime as well     Current Facility-Administered Medications   Medication Frequency    albumin human 25% bottle 25 g Q6H    albuterol-ipratropium 2.5 mg-0.5 mg/3 mL nebulizer solution 3 mL Q4H PRN    albuterol-ipratropium 2.5 mg-0.5 mg/3 mL nebulizer solution 3 mL Q4H WAKE    bisacodyl suppository 10 mg Daily PRN    ceFEPIme injection 1 g Q24H    dextrose 50% injection 12.5 g PRN    dextrose 50% injection 25 g PRN    ergocalciferol capsule 50,000 Units Q7 Days    famotidine tablet 20 mg QHS    glucagon (human recombinant) injection 1 mg PRN    glucose chewable tablet 16 g PRN    glucose chewable tablet 24 g PRN    heparin (porcine) injection 1,000 Units PRN    heparin (porcine) injection 5,000 Units Q8H    insulin aspart U-100 pen 0-5 Units QID (AC + HS) PRN    insulin aspart U-100 pen 3 Units TIDWM    isavuconazonium sulfate Cap 372 mg Daily    lidocaine HCL 10 mg/ml (1%) injection 1 mL Once    linezolid tablet 600 mg Q12H    midodrine tablet 10 mg TID PRN    nystatin 100,000 unit/mL suspension 500,000 Units QID (WM & HS)    ondansetron injection 4 mg Q6H PRN    predniSONE tablet 10 mg Daily    Followed by    [START ON 12/10/2018] predniSONE tablet 5 mg Daily    Followed by    [START ON 12/17/2018] predniSONE tablet 2.5 mg Daily    sodium bicarbonate tablet 650 mg BID    sulfamethoxazole-trimethoprim 400-80mg per tablet 1 tablet Every Mon, Wed, Fri    tiZANidine tablet 2 mg Q8H PRN    trazodone split tablet 25 mg Nightly PRN    trazodone split tablet 25 mg QHS    ursodiol capsule 300 mg BID    valganciclovir 50 mg/ml oral solution 100 mg  Once per day on Mon Wed Fri       Objective:     Vital Signs (Most Recent):  Temp: 98.2 °F (36.8 °C) (12/06/18 1153)  Pulse: 102 (12/06/18 1118)  Resp: 18 (12/06/18 1118)  BP: 103/73 (12/06/18 0728)  SpO2: 98 % (12/06/18 1118)  O2 Device (Oxygen Therapy): room air (12/06/18 1118) Vital Signs (24h Range):  Temp:  [98 °F (36.7 °C)-98.3 °F (36.8 °C)] 98.2 °F (36.8 °C)  Pulse:  [] 102  Resp:  [16-27] 18  SpO2:  [87 %-100 %] 98 %  BP: (101-138)/(71-97) 103/73     Weight: 77.9 kg (171 lb 11.8 oz) (12/06/18 0500)  Body mass index is 24.64 kg/m².  Body surface area is 1.96 meters squared.    I/O last 3 completed shifts:  In: 1920 [P.O.:920; Blood:250; Other:750]  Out: 3125 [Other:3125]    Physical Exam   Constitutional: He is oriented to person, place, and time. He appears well-developed. He is sleeping. He is easily aroused. No distress.   HENT:   Head: Normocephalic and atraumatic.   Neck: Normal range of motion. No JVD present.   Cardiovascular: Normal rate and regular rhythm. Exam reveals no friction rub.   Pulmonary/Chest: Effort normal and breath sounds normal.   Abdominal: Soft. He exhibits distension.   Musculoskeletal: He exhibits no edema.   Neurological: He is alert, oriented to person, place, and time and easily aroused.   More alert this AM, engaging in conversation   Skin: Skin is warm. He is not diaphoretic.       Significant Labs:  CBC:   Recent Labs   Lab 12/06/18  0621   WBC 9.63   RBC 3.18*   HGB 9.7*   HCT 30.2*      MCV 95   MCH 30.5   MCHC 32.1     CMP:   Recent Labs   Lab 12/06/18 0621   *   CALCIUM 8.1*   ALBUMIN 1.6*   PROT 5.0*      K 3.6   CO2 24      BUN 26*   CREATININE 2.3*   ALKPHOS 259*   ALT 19   AST 15   BILITOT 2.4*

## 2018-12-06 NOTE — CARE UPDATE
BG goal 140-180. BG slightly below goal overnight with scheduled insulin. Required correction scale x1 yesterday.     continue Novolog 3 units with meals  Low dose correction scale given kidney function  BG monitoring AC/HS      ** Please call Endocrine for any BG related issues **

## 2018-12-06 NOTE — PLAN OF CARE
"Problem: Patient Care Overview  Goal: Plan of Care Review  Dx: Liver transplant (11/11)  hx: ESLD, ETOH abuse; Severe depression.    11/12: liver transplant; extubated. Products given in OR and HD in OR. CRRT nocturnal.  11/13: CRRT nocturnal held; lines removed. FFP x1 given. Pulled out 2 JOSE A drains.   11/14: 3 PRBC's given for hgb 6; liver US shows potential hematoma. Trend CBC. Extreme agitation/attempted to "kill self" by holding breath for as long as possible multiple times. IV ativan/haldol given. Nocturnal CRRT restarted  Potential washout  To be determined.    11/16: OR for exlap and washout--1U PRBCs and 1 plts; sent back to OR--3 U PRBCs, 1FFP, 1cryo; abd open and wound vac placed  11/17: CT chest, abd, pelvis, levo off 2 units PRBCs, 2 units platelets, 1 unit FFP   11/18: OR for closure, extubated   11/19: clear liquid diet   11/20: TPN/Lipids d/c'd, renal diet, transfer orders  11/21: Head and chest CT  11/22: Pt to IR for drain placement  11/23: HD trial  11/25: HD 2 Liters removed  Nursing:   -160          Outcome: Ongoing (interventions implemented as appropriate)  -Pt AAOx4  -Vital signs stable  -BG monitoring ACHS; BG HS was 137, *no SSI coverage needed  -Chevron incision CDI; ECTOR with staples  -PRN Tizanidine given x1 so far thus shift  -Fall precautions maintained, non skid socks worn  -No acute events/falls/injuries this shift  -Mother at bedside  -Pt aware to call for help with ambulating.    -Bed lowered, locked, siderails up x2, and call bell in reach.     See flowsheet for assessment findings.  Will continue to monitor pt.      "

## 2018-12-06 NOTE — ASSESSMENT & PLAN NOTE
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   - s/p OHLTx 11/11/2018; intra-op SLED  - remaining oliguric   -hemodynamically stable, off pressors    Plan  -HD completed on yesterday without issues, UF 2.3 L. Electrolytes stable.   -Improved mental status this AM, patient engaging in coversation.  -No urgent need for dialysis today. Will dialyze tomorrow per schedule.   -Closely follow strict Is & Os and serial RFPs  -Avoid NSAIDs, contrast, nephrotoxins   -Daily weights  -Renally dose medications to current GFR  -Will discuss with staff

## 2018-12-06 NOTE — PROGRESS NOTES
[facility]  Liver Transplant  Progress Note    Patient Name: Femi Enciso  MRN: [id number]  Admission Date: 10/27/2018  Hospital Length of Stay: 40 days  Code Status: Full Code  Primary Care Provider: Primary Doctor No  Post-Operative Day: 25    ORGAN:   LIVER  Disease Etiology: Alcoholic Cirrhosis  Donor Type:    - Brain Death  CDC High Risk:   No  Donor CMV Status:   Donor CMV Status: Positive  Donor HBcAB:   Negative  Donor HCV Status:   Negative  Donor HBV JAVIER: Negative  Donor HCV JAVIER: Negative  Whole or Partial: Whole Liver  Biliary Anastomosis: End to End  Arterial Anatomy: Standard  Subjective:     History of Present Illness:  Femi Enciso is a 53yr old male with PMH of acute ETOH hepatitis and DEL/ATN evolved to ESRD requiring HD (not candidate for KTX). He is now s/p liver transplant (SM induction, DBD, CMV D+/R-). Transplant surgery noted severe collateralization, adrenal gland firmly adhered to liver. Bare area of adrenal gland required several stitches and packing to obtain fair hemostasis (EBL 15L). OR take back  for bleeding from R adrenal bed in AM (significant clot in retroperitoneum, posterior to R hepatic lobe, tract posterior to the R kidney containing significant clot, small bleeding area of retroperitoneal fat) then returned to surgery same day for hemorrhaging closed with wound vac with plans to return to OR 24-48 hours for closure (retroperitoneal /retrorenal/retrocolic hematoma on the right ). Raw retroperitoneal bleeding. Suture ligatures placed, argon beam cautery, evarest placed in retroperitoneum behind cava and right kidney. Significant oozing from upper right adrenal gland, not amenable to ligation, that portion of the adrenal gland was resected with cautery). OR take back  for washout and incisional closure, no significant bleeding or hematoma found. OR cultures   from body fluid grew enterococcus faecium VRE. ID was consulted,  started on  daptomycin for VRE treatment and cefepime/flagyl to cover for IA ty in bile. Patient with significant leukocytosis with peak on  at 48K prompting drainage of R subphrenic fluid collection, perc drain placed, culture grew VRE. Stroke code called  for lethargy and unresponsive, CT head without evidence of acute ischemic changes and CTA chest negative, vascular neurology was consulted recommended MRI brain, but patient's AMS improved shortly after event. Nephrology consulted for ESRD requiring HD. Patient overall improved on antibiotic regimen, leukocytosis and AMS improved. He was transferred to [facility] on POD #15.     Hospital Course:  Interval History:  No acute events overnight. Mental status improving daily.  He is alert and oriented to person, place and situation.  Family agrees mental status better.  Per family, he is sleeping well on Trazodone- will  dose to 25 mg at hs w one PRN dose as needed.  Psych was consulted- initial recs were to cont Trazodone if sleeping well.  Pt taking Zanaflex as needed w good mgt of back pain.  Repeat CT head  with no acute findings. AST/ALT trending down.  T bili remains 2.4.  Cr remains elevated.  He tolerated HD yesterday evening 2.3 L removed.  Pt transfused 1 u PRBC w HD and responded appropriately.  He cont to have crackles to mell bases.  Pitting edema to BLE and abdomen.  Plan for Lasix 100 mg and Albumin x3.  Per wife, UOP inceased yesterday.  WBC is trending down.  Blood cx / NGTD, urine cx /4 NGTD. CT A/P 18 with no fluid collections in need of draining per surgeon. ID recommends treating R side empyema with Cefepime x 1 week (end date 18).  Cont Linezolid for VRE thru 18.    Pt remains afebrile.  Monitor.         Scheduled Meds:   albumin human 25%  25 g Intravenous Q6H    albuterol-ipratropium  3 mL Nebulization Q4H WAKE    ceFEPime (MAXIPIME) IVPB  1 g Intravenous Q24H    ergocalciferol  50,000 Units Oral Q7 Days     famotidine  20 mg Oral QHS    heparin (porcine)  5,000 Units Subcutaneous Q8H    insulin aspart U-100  3 Units Subcutaneous TIDWM    isavuconazonium sulfate  372 mg Oral Daily    lidocaine HCL 10 mg/ml (1%)  1 mL Intradermal Once    linezolid  600 mg Oral Q12H    nystatin  500,000 Units Oral QID (WM & HS)    predniSONE  10 mg Oral Daily    Followed by    [START ON 12/10/2018] predniSONE  5 mg Oral Daily    Followed by    [START ON 12/17/2018] predniSONE  2.5 mg Oral Daily    sodium bicarbonate  650 mg Oral BID    sulfamethoxazole-trimethoprim 400-80mg  1 tablet Oral Every Mon, Wed, Fri    traZODone  25 mg Oral QHS    ursodiol  300 mg Oral BID    valganciclovir 50 mg/ml  100 mg Oral Once per day on Mon Wed Fri     Continuous Infusions:  PRN Meds:albuterol-ipratropium, bisacodyl, dextrose 50%, dextrose 50%, glucagon (human recombinant), glucose, glucose, heparin (porcine), insulin aspart U-100, midodrine, ondansetron, tiZANidine, traZODone    Review of Systems   Constitutional: Positive for activity change, appetite change (poor) and fatigue. Negative for fever.   HENT: Positive for voice change. Negative for facial swelling.    Eyes: Negative.    Respiratory: Positive for cough ( nonproductive ) and shortness of breath (w exertion). Negative for apnea, choking, chest tightness and wheezing.    Cardiovascular: Negative.  Negative for chest pain, palpitations and leg swelling.   Gastrointestinal: Positive for abdominal distention and abdominal pain. Negative for constipation, diarrhea, nausea and vomiting.   Genitourinary: Positive for decreased urine volume. Negative for difficulty urinating, dysuria, hematuria and urgency.   Musculoskeletal: Negative.  Negative for back pain, gait problem, neck pain and neck stiffness.   Skin: Positive for wound. Negative for color change and pallor.   Allergic/Immunologic: Positive for immunocompromised state.   Neurological: Positive for weakness. Negative for  dizziness, seizures, light-headedness and headaches.   Psychiatric/Behavioral: Positive for decreased concentration and dysphoric mood. Negative for behavioral problems, confusion, hallucinations, sleep disturbance and suicidal ideas. The patient is not nervous/anxious.      Objective:     Vital Signs (Most Recent):  Temp: 98.2 °F (36.8 °C) (12/06/18 1153)  Pulse: 102 (12/06/18 1118)  Resp: 18 (12/06/18 1118)  BP: 103/73 (12/06/18 0728)  SpO2: 98 % (12/06/18 1118) Vital Signs (24h Range):  Temp:  [98 °F (36.7 °C)-98.3 °F (36.8 °C)] 98.2 °F (36.8 °C)  Pulse:  [] 102  Resp:  [18-27] 18  SpO2:  [96 %-100 %] 98 %  BP: (101-125)/(71-92) 103/73     Weight: 77.9 kg (171 lb 11.8 oz)  Body mass index is 24.64 kg/m².    Intake/Output - Last 3 Shifts       12/04 0700 - 12/05 0659 12/05 0700 - 12/06 0659 12/06 0700 - 12/07 0659    P.O. 370 800     Blood  250     Other  750     Total Intake(mL/kg) 370 (4.6) 1800 (23.1)     Urine (mL/kg/hr) 200 (0.1) 0 (0)     Emesis/NG output  0     Other  3125     Stool 0 0     Blood  0     Total Output 200 3125     Net +170 -1325            Urine Occurrence  0 x     Stool Occurrence 1 x 2 x 1 x    Emesis Occurrence  0 x           Physical Exam   Constitutional: He is oriented to person, place, and time. He appears well-developed. No distress.   HENT:   Head: Normocephalic and atraumatic.   White drainage noted to back of throat and roof on mouth, voice hoarse   Eyes: Pupils are equal, round, and reactive to light. Scleral icterus is present.   Neck: Normal range of motion. Neck supple. No JVD present.   Cardiovascular: Normal rate, regular rhythm and normal heart sounds.   No murmur heard.  Pulmonary/Chest: Effort normal. No respiratory distress. He has decreased breath sounds in the right lower field and the left lower field. He has no wheezes. He exhibits no tenderness.   Taking short breaths, IS up to 500   Abdominal: Soft. Bowel sounds are normal. He exhibits no distension. There is  tenderness.   Chevron inc ECTOR w/ staples  RLQ JOSE A drain and percutaneous drain site w suture CDI.    Musculoskeletal: Normal range of motion. He exhibits no tenderness. Edema: 2+ BLE.   Neurological: He is alert and oriented to person, place, and time. He has normal reflexes.   Skin: Skin is warm and dry. Capillary refill takes 2 to 3 seconds. He is not diaphoretic.   Psychiatric: His affect is labile. He is slowed. Cognition and memory are impaired.   Nursing note and vitals reviewed.      Laboratory:  Immunosuppressants     None        CBC:   Recent Labs   Lab 12/06/18 0621   WBC 9.63   RBC 3.18*   HGB 9.7*   HCT 30.2*      MCV 95   MCH 30.5   MCHC 32.1     CMP:   Recent Labs   Lab 12/06/18 0621   *   CALCIUM 8.1*   ALBUMIN 1.6*   PROT 5.0*      K 3.6   CO2 24      BUN 26*   CREATININE 2.3*   ALKPHOS 259*   ALT 19   AST 15   BILITOT 2.4*     Coagulation:   Recent Labs   Lab 12/06/18 0621   INR 1.1     Tacrolimus Lvl   Date Value Ref Range Status   12/06/2018 3.3 (L) 5.0 - 15.0 ng/mL Final     Comment:     Testing performed by Liquid Chromatography-Tandem  Mass Spectrometry (LC-MS/MS).  This test was developed and its performance characteristics  determined by [facility], Department of Pathology  and Laboratory Medicine in a manner consistent with CLIA  requirements. It has not been cleared or approved by the [place]  Food and Drug Administration.  This test is used for clinical   purposes.  It should not be regarded as investigational or for  research.     12/05/2018 6.7 5.0 - 15.0 ng/mL Final     Comment:     Testing performed by Liquid Chromatography-Tandem  Mass Spectrometry (LC-MS/MS).  This test was developed and its performance characteristics  determined by [facility], Department of Pathology  and Laboratory Medicine in a manner consistent with CLIA  requirements. It has not been cleared or approved by the [place]  Food and Drug Administration.  This test is used  for clinical   purposes.  It should not be regarded as investigational or for  research.     12/04/2018 14.1 5.0 - 15.0 ng/mL Final     Comment:     Testing performed by Liquid Chromatography-Tandem  Mass Spectrometry (LC-MS/MS).  This test was developed and its performance characteristics  determined by [facility], Department of Pathology  and Laboratory Medicine in a manner consistent with CLIA  requirements. It has not been cleared or approved by the [place]  Food and Drug Administration.  This test is used for clinical   purposes.  It should not be regarded as investigational or for  research.     12/03/2018 12.3 5.0 - 15.0 ng/mL Final     Comment:     Testing performed by Liquid Chromatography-Tandem  Mass Spectrometry (LC-MS/MS).  This test was developed and its performance characteristics  determined by [facility], Department of Pathology  and Laboratory Medicine in a manner consistent with CLIA  requirements. It has not been cleared or approved by the [place]  Food and Drug Administration.  This test is used for clinical   purposes.  It should not be regarded as investigational or for  research.           Labs within the past 24 hours have been reviewed.    Diagnostic Results:  None    Assessment/Plan:     * S/P liver transplant    - 53 year old male with history of ESLD 2/2 ETOH cirrhosis who is s/p liver transplant c/b take-back x 3 for bleeding most recently 11/18.  - LFTs now improving slowly.  T bili was 37.6 day of transplant.  - Pt remains with significant debility. Pt will need SNF placement when medically stable.   - Appetite slowly improving.  Tolerating supplements.  Encourage PO intake.   - Drain placed during take back. Drain placed to intra-abdominal abscess on 11/22.   - Fluid from collection noted with enterococcus VRE. Cont with antibxs per ID.  De escalated to PO on 11/29/18.    - Abdominal pain well controlled, and having BMs.    - HD per nephrology.   - Muscle relaxer  started and will hold off on oxycodone for now.        Moderate malnutrition    - muscle wasting noted.  Appetite was improving.  Worse over the wkd.  Cont supplements.  Dietician following.  Apprec recs.         Acute renal failure with acute tubular necrosis superimposed on stage 3 chronic kidney disease    - Renal function slow to recover post-op.   - Nephrology consulted for management.   - Cont with HD as per nephrology recs.  Apprec recs.    - HD on 11/27 w/ 2L off. Pt tolerated well.    - Lasix 160 mg challenge 11/28 w/ minimal output.    - HD performed on 12/3 at bedside. 1.5 removed, tolerated.   - Strict I&Os.      Intra-abdominal abscess    - Significant increase in WBC post-op.   - OR cultures from 11/18 noted with enterococcus VRE.   - Abdominal CT performed at that time with fluid collection and drain placed on 11/22 for drainage.   - Intra-abdominal abscess culture also with enterococcus VRE.   - ID consulted and pt placed on antibxs for treatment. Pt was on Cefepime, Daptomycin, and Fluconazole for treatment.    - Pt remains with RLQ drains (one JOSE A and one Percutaneous).  One JOSE A removed 11/28.  Perc drain in placed draining tan, clear drainage.  WBC slowly improving.   - Liver US on 11/26 reviewed.   - Abdominal CT performed 11/27 and reviewed. Fluid collection noted with improvement on CT.  ID following and reassured by findings.    - Dapto, Cefepime and Flagyl changed 11/30/18 to Linezolid 600 mg PO q 12 hr x 10 days per ID.     - added back cefepime after thora.  ID re consulted.  -Plan to complete Zyvox x 10 days per ID thru 12/9/18. Monitor.      Long-term use of immunosuppressant medication    - Cont with prograf. Draw prograf level daily and adjust dose as needed to maintain a therapeutic level.        At risk for opportunistic infections    - Cont with bactrim and valcyte for protection against opportunistic infections.   - cont Isavuconazonium.     Leucocytosis    - See intra-abdominal  abscess.  - wbc improving, cont w delirium.    - Repeat cx 12/4 with NGTD.  Cont Linezolid and Cefepime per ID.         Delirium    - Pt with intermittent confusion since transplant was improving slowly.   - Pt with some lethargy and confusion on 11/21. Head CT negative.   - AAOx3. More alert and awake on 11/27.   - AAOx4 on 11/29.  He was happy he could remember what day it is today.    - confusion worse over the wkd.  Seroquel d/c'd 11/30/18.  Trazodone for insomnia ordered w PRN dose.    - re consulted psych, awaiting recommendations.   - delirium could be d/t rising prograf.  - CT head 12/4 with no acute findings.        Prophylactic immunotherapy    - See long term immunosuppression.        Weakness    - Pt remains significantly debilitated.   - PT/OT for strengthening.   - Currently will need SNF for placement and further rehabilitation.        Pleural effusion associated with hepatic disorder    - c/o increased pain w deep breath today to right side.  CXR showed increased opacity in the inferior hemothorax on the right side since 11/26/2018.  Pulse ox 97-99% on RA.  Cont to monitor    - continues to have fluid to RLL.  WBC remains elevated.    - thoracentesis performed on 11/30. Fluid noted with 4k WBC, 93 SEGS. cx no growth to date.  Cefepime added for treatment.  - Chest CT showing right pleural effusion improved, left w increased pleural effusion.   - Per ID, will treat empyema w Cefepime thru 12/8.     Type 2 diabetes mellitus without complication    - Endocrine consulted for management. Appreciate recs.        Anemia of chronic disease    - H&H trending down. Will transfuse 1 unit of PRBC with HD. Monitor with daily labs.   - Chest/Abd/pelvis CT 12/4- no fluid to be drainged per transplant surgeon.  No source of bleeding.     - last liver US 11/27. Evolving peritransplant hematomas with anechoic fluid collection adjacent to the right hepatic lobe.  Small volume perihepatic free fluid.     DEL (acute  kidney injury)    - DEL for over 2 weeks prior to transplant. Nephrology was consulted.     - Post transplant, Nephrology cont to follow.  He received HD last on 11/30 for metabolic clearance and fluid management.     - Attempted Lasix challenge (160 mg) x1 on 11/28- pt diuresed 105 cc.    - HD 12/3- removed 1.5 liter.  Cont strict I/O's.  Monitor closely.  - Lasix challenge 12/4 with minimal UOP  - Last HD 12/5- 2.3L removed.  Still w crackles to mell bases and dependent edema.  Lasix 100mg and albumin x3 today.  Monitor.      Acute renal failure    - DEL past 2 weeks.  Pt on Midodrine.  Last HD 11/9/18- 0 fluid removed 2/2 hypotension.  - Nephrology following for HD-  Pt cont to have hypotenstion worse w standing.  Midodrine 10 mg PRN for hypotension during HD ordered.           VTE Risk Mitigation (From admission, onward)        Ordered     heparin (porcine) injection 5,000 Units  Every 8 hours      11/30/18 1149     heparin (porcine) injection 1,000 Units  As needed (PRN)      11/25/18 1428     IP VTE HIGH RISK PATIENT  Once      11/11/18 1208          The patients clinical status was discussed at multidisplinary rounds, involving transplant surgery, transplant medicine, pharmacy, nursing, nutrition, and social work    Discharge Planning:  No plan for discharge.    Hilary Galarza, NP  Liver Transplant  [facility]

## 2018-12-06 NOTE — ASSESSMENT & PLAN NOTE
BG goal 140-180    decrease Novolog 2 units with meals  Low dose correction scale given kidney function  BG monitoring AC/HS    Discharge plans- TBD
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
## 2018-12-06 NOTE — PROGRESS NOTES
[facility]  Nephrology  Progress Note    Patient Name: Femi Enciso  MRN: [id number]  Admission Date: 10/27/2018  Hospital Length of Stay: 40 days  Attending Provider: Femi Patel MD   Primary Care Physician: Primary Doctor No  Principal Problem:S/P liver transplant    Subjective:     HPI: 52 y/o man with DM2 presents to the ED with family for liver failure (likely due to EtOH abundant history of drinking - diagnosed in Sept 2018 in [place]).  He reports jaundice, generalized weakness, nausea, diarrhea, and decreased appetite since Sept 2018.  He is from [place], [place], and Dr. Sharma (patients physician) recommended bringing him to hospital for evaluation.  Patient denies any fever, chills, vomiting, chest pain, palpitations, SOB, abdominal pain.      Nephrology consulted for evaluation/management Adri.     Interval History: Tolerated iHD well on yesterday, UF 2.3 L. Patient anuric today. No signs of volume overload. Electrolytes and acid/base stable. BP stable. No clinical signs of uremia on assessment this morning.       Review of patient's allergies indicates:   Allergen Reactions    Penicillins Nausea And Vomiting and Rash     Full body rash from cefepime as well     Current Facility-Administered Medications   Medication Frequency    albumin human 25% bottle 25 g Q6H    albuterol-ipratropium 2.5 mg-0.5 mg/3 mL nebulizer solution 3 mL Q4H PRN    albuterol-ipratropium 2.5 mg-0.5 mg/3 mL nebulizer solution 3 mL Q4H WAKE    bisacodyl suppository 10 mg Daily PRN    ceFEPIme injection 1 g Q24H    dextrose 50% injection 12.5 g PRN    dextrose 50% injection 25 g PRN    ergocalciferol capsule 50,000 Units Q7 Days    famotidine tablet 20 mg QHS    glucagon (human recombinant) injection 1 mg PRN    glucose chewable tablet 16 g PRN    glucose chewable tablet 24 g PRN    heparin (porcine) injection 1,000 Units PRN    heparin (porcine) injection 5,000 Units Q8H    insulin aspart U-100 pen 0-5  Units QID (AC + HS) PRN    insulin aspart U-100 pen 3 Units TIDWM    isavuconazonium sulfate Cap 372 mg Daily    lidocaine HCL 10 mg/ml (1%) injection 1 mL Once    linezolid tablet 600 mg Q12H    midodrine tablet 10 mg TID PRN    nystatin 100,000 unit/mL suspension 500,000 Units QID (WM & HS)    ondansetron injection 4 mg Q6H PRN    predniSONE tablet 10 mg Daily    Followed by    [START ON 12/10/2018] predniSONE tablet 5 mg Daily    Followed by    [START ON 12/17/2018] predniSONE tablet 2.5 mg Daily    sodium bicarbonate tablet 650 mg BID    sulfamethoxazole-trimethoprim 400-80mg per tablet 1 tablet Every Mon, Wed, Fri    tiZANidine tablet 2 mg Q8H PRN    trazodone split tablet 25 mg Nightly PRN    trazodone split tablet 25 mg QHS    ursodiol capsule 300 mg BID    valganciclovir 50 mg/ml oral solution 100 mg Once per day on Mon Wed Fri       Objective:     Vital Signs (Most Recent):  Temp: 98.2 °F (36.8 °C) (12/06/18 1153)  Pulse: 102 (12/06/18 1118)  Resp: 18 (12/06/18 1118)  BP: 103/73 (12/06/18 0728)  SpO2: 98 % (12/06/18 1118)  O2 Device (Oxygen Therapy): room air (12/06/18 1118) Vital Signs (24h Range):  Temp:  [98 °F (36.7 °C)-98.3 °F (36.8 °C)] 98.2 °F (36.8 °C)  Pulse:  [] 102  Resp:  [16-27] 18  SpO2:  [87 %-100 %] 98 %  BP: (101-138)/(71-97) 103/73     Weight: 77.9 kg (171 lb 11.8 oz) (12/06/18 0500)  Body mass index is 24.64 kg/m².  Body surface area is 1.96 meters squared.    I/O last 3 completed shifts:  In: 1920 [P.O.:920; Blood:250; Other:750]  Out: 3125 [Other:3125]    Physical Exam   Constitutional: He is oriented to person, place, and time. He appears well-developed. He is sleeping. He is easily aroused. No distress.   HENT:   Head: Normocephalic and atraumatic.   Neck: Normal range of motion. No JVD present.   Cardiovascular: Normal rate and regular rhythm. Exam reveals no friction rub.   Pulmonary/Chest: Effort normal and breath sounds normal.   Abdominal: Soft. He exhibits  distension.   Musculoskeletal: He exhibits no edema.   Neurological: He is alert, oriented to person, place, and time and easily aroused.   More alert this AM, engaging in conversation   Skin: Skin is warm. He is not diaphoretic.       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   - s/p OHLTx 11/11/2018; intra-op SLED  - remaining oliguric   -hemodynamically stable, off pressors    Plan  -HD completed on yesterday without issues, UF 2.3 L. Electrolytes stable.   -Improved mental status this AM, patient engaging in coversation.  -No urgent need for dialysis today. Will dialyze tomorrow per schedule.   -Closely follow strict Is & Os and serial RFPs  -Avoid NSAIDs, contrast, nephrotoxins   -Daily weights  -Renally dose medications to current GFR  -Will discuss with staff                    Case discussed with staff further recs with attestation.     I will follow-up with patient. Please contact us if you have any additional questions.    TAMELA Rousseau DNP, APRN, FNP-C  Department of Nephrology  [facility]  Pager: [phone]    ATTENDING PHYSICIAN ATTESTATION  I have personally interviewed and examined the patient. I thoroughly reviewed the demographic, clinical, laboratorial and imaging information available in medical records. I agree with the assessment and recommendations  provided by the ABAD Rousseau who was under my supervision.

## 2018-12-06 NOTE — ASSESSMENT & PLAN NOTE
Managed per LTS.   avoid hypoglycemia  Optimize BG control for surgical wound healing.     Lab Results   Component Value Date    ALT 19 12/06/2018    AST 15 12/06/2018     (H) 11/26/2018    ALKPHOS 259 (H) 12/06/2018    BILITOT 2.4 (H) 12/06/2018

## 2018-12-06 NOTE — ASSESSMENT & PLAN NOTE
- c/o increased pain w deep breath today to right side.  CXR showed increased opacity in the inferior hemothorax on the right side since 11/26/2018.  Pulse ox 97-99% on RA.  Cont to monitor    - continues to have fluid to RLL.  WBC remains elevated.    - thoracentesis performed on 11/30. Fluid noted with 4k WBC, 93 SEGS. cx no growth to date.  Cefepime added for treatment.  - Chest CT showing right pleural effusion improved, left w increased pleural effusion.   - Per ID, will treat empyema w Cefepime thru 12/8.

## 2018-12-06 NOTE — ASSESSMENT & PLAN NOTE
- Significant increase in WBC post-op.   - OR cultures from 11/18 noted with enterococcus VRE.   - Abdominal CT performed at that time with fluid collection and drain placed on 11/22 for drainage.   - Intra-abdominal abscess culture also with enterococcus VRE.   - ID consulted and pt placed on antibxs for treatment. Pt was on Cefepime, Daptomycin, and Fluconazole for treatment.    - Pt remains with RLQ drains (one JOSE A and one Percutaneous).  One JOSE A removed 11/28.  Perc drain in placed draining tan, clear drainage.  WBC slowly improving.   - Liver US on 11/26 reviewed.   - Abdominal CT performed 11/27 and reviewed. Fluid collection noted with improvement on CT.  ID following and reassured by findings.    - Dapto, Cefepime and Flagyl changed 11/30/18 to Linezolid 600 mg PO q 12 hr x 10 days per ID.     - added back cefepime after thora.  ID re consulted.  -Plan to complete Zyvox x 10 days per ID thru 12/9/18. Monitor.

## 2018-12-06 NOTE — PLAN OF CARE
Pt AAO x4 throughout shift. Pt speech slightly delayed but fluent. Pt up with max assist of wife throughout shift. Pt free from falls and injury. BG monitored AC/HS per order. Prograf restarted at 0.5 mg bid.

## 2018-12-06 NOTE — SUBJECTIVE & OBJECTIVE
"Interval History:   No distress.  Mental status and level of awareness improved.  No acute events overnight.      Received trazodone 50 last night.    Family History     None        Tobacco Use    Smoking status: Never Smoker   Substance and Sexual Activity    Alcohol use: Not on file    Drug use: Not on file    Sexual activity: Not on file     Psychotherapeutics (From admission, onward)    Start     Stop Route Frequency Ordered    11/30/18 2100  traZODone tablet 50 mg      -- Oral Nightly 11/30/18 1055    11/30/18 1233  trazodone split tablet 25 mg      -- Oral Nightly PRN 11/30/18 1150    11/14/18 1118  haloperidol lactate (HALDOL) 5 mg/mL injection     Comments:  Created by cabinet override    11/14 2675   11/14/18 1118           Review of Systems   Constitutional: Negative for fever.   HENT: Negative for hearing loss.    Eyes: Negative for visual disturbance.   Respiratory: Negative for shortness of breath.    Cardiovascular: Negative for chest pain.   Gastrointestinal: Positive for abdominal pain.   Neurological: Negative for weakness.   Psychiatric/Behavioral: Positive for confusion, decreased concentration and sleep disturbance. Negative for hallucinations.     Objective:     Vital Signs (Most Recent):  Temp: 98.3 °F (36.8 °C) (12/06/18 0728)  Pulse: 98 (12/06/18 0754)  Resp: 18 (12/06/18 0754)  BP: 103/73 (12/06/18 0728)  SpO2: 97 % (12/06/18 0754) Vital Signs (24h Range):  Temp:  [98 °F (36.7 °C)-98.3 °F (36.8 °C)] 98.3 °F (36.8 °C)  Pulse:  [] 98  Resp:  [16-29] 18  SpO2:  [87 %-100 %] 97 %  BP: (101-142)/(71-97) 103/73     Height: 5' 10" (177.8 cm)  Weight: 77.9 kg (171 lb 11.8 oz)  Body mass index is 24.64 kg/m².      Intake/Output Summary (Last 24 hours) at 12/6/2018 0950  Last data filed at 12/6/2018 0500  Gross per 24 hour   Intake 1800 ml   Output 3125 ml   Net -1325 ml       Physical Exam   Constitutional: He appears well-developed. No distress.   HENT:   Head: Normocephalic. " "  Cardiovascular: Normal rate and regular rhythm.   Pulmonary/Chest: Effort normal.   Abdominal:   abd incision w/ staples in place.  Clean/dry/intact.   Musculoskeletal: He exhibits no edema.           CAM-ICU:  1. Acute change and/or fluctuating course of mental status: Yes  2. Inattention (SAVEAHAART): No  ? "Squeeze my hand, only when you hear, the letter 'A'."  3. Altered Level of Consciousness: No  4. Disorganized Thinking (Errors >1/6):  No  ? "Will a stone float on water?"  ? "Are there fish in the sea?"  ? "Does one pound weigh more than two?"  ? "Can you use a hammer to pound a nail?"  ? Command(s):  § "Hold up 2 fingers."  § "Now do the same thing with the other hand."       Mental Status Exam:  Appearance: age appropriate, lying in bed, diffuse jaundice  Grooming: appropriate to situation  Arousal: awake.  Behavior/Cooperation: cooperative, psychomotor retardation  Speech: slight slowing/delay.  Language: appropriate english vocabulary  Mood: neutral  Affect: normal  Thought Process: normal  Thought Content: appropriate  Associations: no loose associations noted  Orientation: person, place, month/year.  Not oriented to day or situation.  Memory: Limited.  Registration 3/3.  Recall 1/3 at 5 minutes.  Fund of Knowledge: Decreased  Attention Span/Concentration:  Unable to complete serial 7s.  Cognition: similarities were concrete  Insight: fair  Judgment: fair     Significant Labs: All pertinent labs within the past 24 hours have been reviewed.          Ref. Range 11/29/2018 07:03 11/30/2018 07:23 12/1/2018 07:22 12/2/2018 06:50 12/3/2018 06:30 12/4/2018 07:02 12/5/2018 06:30   Tacrolimus Lvl Latest Ref Range: 5.0 - 15.0 ng/mL 9.3 13.8 14.4 9.4 12.3 14.1 6.7         Significant Imaging: I have reviewed all pertinent imaging results/findings within the past 24 hours.        "

## 2018-12-07 PROBLEM — F10.21 ALCOHOL USE DISORDER, SEVERE, IN EARLY REMISSION, DEPENDENCE: Status: ACTIVE | Noted: 2018-12-07

## 2018-12-07 PROBLEM — R41.0 DELIRIUM: Status: ACTIVE | Noted: 2018-12-07

## 2018-12-07 PROBLEM — G93.40 ACUTE ENCEPHALOPATHY: Status: RESOLVED | Noted: 2018-11-14 | Resolved: 2018-12-07

## 2018-12-07 PROBLEM — G93.40 ACUTE ENCEPHALOPATHY: Status: ACTIVE | Noted: 2018-11-14

## 2018-12-07 LAB
ALBUMIN SERPL BCP-MCNC: 2.3 G/DL
ALP SERPL-CCNC: 204 U/L
ALT SERPL W/O P-5'-P-CCNC: 19 U/L
ANION GAP SERPL CALC-SCNC: 7 MMOL/L
AST SERPL-CCNC: 14 U/L
BASOPHILS # BLD AUTO: 0.22 K/UL
BASOPHILS NFR BLD: 2.4 %
BILIRUB SERPL-MCNC: 2.4 MG/DL
BUN SERPL-MCNC: 35 MG/DL
CALCIUM SERPL-MCNC: 8.4 MG/DL
CHLORIDE SERPL-SCNC: 109 MMOL/L
CO2 SERPL-SCNC: 24 MMOL/L
CREAT SERPL-MCNC: 2.8 MG/DL
DIFFERENTIAL METHOD: ABNORMAL
EOSINOPHIL # BLD AUTO: 0.1 K/UL
EOSINOPHIL NFR BLD: 1 %
ERYTHROCYTE [DISTWIDTH] IN BLOOD BY AUTOMATED COUNT: 16.7 %
EST. GFR  (AFRICAN AMERICAN): 28.5 ML/MIN/1.73 M^2
EST. GFR  (NON AFRICAN AMERICAN): 24.6 ML/MIN/1.73 M^2
GLUCOSE SERPL-MCNC: 130 MG/DL
HCT VFR BLD AUTO: 27.8 %
HGB BLD-MCNC: 8.5 G/DL
IMM GRANULOCYTES # BLD AUTO: 0.07 K/UL
IMM GRANULOCYTES NFR BLD AUTO: 0.8 %
INR PPP: 1.2
LYMPHOCYTES # BLD AUTO: 0.5 K/UL
LYMPHOCYTES NFR BLD: 5.4 %
MAGNESIUM SERPL-MCNC: 1.8 MG/DL
MCH RBC QN AUTO: 29.3 PG
MCHC RBC AUTO-ENTMCNC: 30.6 G/DL
MCV RBC AUTO: 96 FL
MONOCYTES # BLD AUTO: 0.5 K/UL
MONOCYTES NFR BLD: 5.7 %
NEUTROPHILS # BLD AUTO: 7.8 K/UL
NEUTROPHILS NFR BLD: 84.7 %
NRBC BLD-RTO: 0 /100 WBC
PHOSPHATE SERPL-MCNC: 3.5 MG/DL
PLATELET # BLD AUTO: 163 K/UL
PMV BLD AUTO: 11.6 FL
POCT GLUCOSE: 121 MG/DL (ref 70–110)
POCT GLUCOSE: 125 MG/DL (ref 70–110)
POCT GLUCOSE: 182 MG/DL (ref 70–110)
POTASSIUM SERPL-SCNC: 3.5 MMOL/L
PROT SERPL-MCNC: 5 G/DL
PROTHROMBIN TIME: 11.9 SEC
RBC # BLD AUTO: 2.9 M/UL
SODIUM SERPL-SCNC: 140 MMOL/L
TACROLIMUS BLD-MCNC: 1.9 NG/ML
WBC # BLD AUTO: 9.14 K/UL

## 2018-12-07 PROCEDURE — 94664 DEMO&/EVAL PT USE INHALER: CPT

## 2018-12-07 PROCEDURE — 63600175 PHARM REV CODE 636 W HCPCS: Performed by: NURSE PRACTITIONER

## 2018-12-07 PROCEDURE — 99233 PR SUBSEQUENT HOSPITAL CARE,LEVL III: ICD-10-PCS | Mod: 24,,, | Performed by: NURSE PRACTITIONER

## 2018-12-07 PROCEDURE — 99232 PR SUBSEQUENT HOSPITAL CARE,LEVL II: ICD-10-PCS | Mod: ,,, | Performed by: NURSE PRACTITIONER

## 2018-12-07 PROCEDURE — 85025 COMPLETE CBC W/AUTO DIFF WBC: CPT

## 2018-12-07 PROCEDURE — 94761 N-INVAS EAR/PLS OXIMETRY MLT: CPT

## 2018-12-07 PROCEDURE — 25000003 PHARM REV CODE 250: Performed by: NURSE PRACTITIONER

## 2018-12-07 PROCEDURE — 97535 SELF CARE MNGMENT TRAINING: CPT

## 2018-12-07 PROCEDURE — 63600175 PHARM REV CODE 636 W HCPCS: Performed by: TRANSPLANT SURGERY

## 2018-12-07 PROCEDURE — 25000242 PHARM REV CODE 250 ALT 637 W/ HCPCS: Performed by: NURSE PRACTITIONER

## 2018-12-07 PROCEDURE — 97530 THERAPEUTIC ACTIVITIES: CPT

## 2018-12-07 PROCEDURE — 99900035 HC TECH TIME PER 15 MIN (STAT)

## 2018-12-07 PROCEDURE — 97112 NEUROMUSCULAR REEDUCATION: CPT

## 2018-12-07 PROCEDURE — 90935 HEMODIALYSIS ONE EVALUATION: CPT

## 2018-12-07 PROCEDURE — 94640 AIRWAY INHALATION TREATMENT: CPT

## 2018-12-07 PROCEDURE — 80197 ASSAY OF TACROLIMUS: CPT

## 2018-12-07 PROCEDURE — 94799 UNLISTED PULMONARY SVC/PX: CPT

## 2018-12-07 PROCEDURE — 83735 ASSAY OF MAGNESIUM: CPT

## 2018-12-07 PROCEDURE — 63600175 PHARM REV CODE 636 W HCPCS: Performed by: PHYSICIAN ASSISTANT

## 2018-12-07 PROCEDURE — 90935 PR HEMODIALYSIS, ONE EVALUATION: ICD-10-PCS | Mod: ,,, | Performed by: NURSE PRACTITIONER

## 2018-12-07 PROCEDURE — 25000003 PHARM REV CODE 250: Performed by: STUDENT IN AN ORGANIZED HEALTH CARE EDUCATION/TRAINING PROGRAM

## 2018-12-07 PROCEDURE — 27000646 HC AEROBIKA DEVICE

## 2018-12-07 PROCEDURE — 20600001 HC STEP DOWN PRIVATE ROOM

## 2018-12-07 PROCEDURE — 80053 COMPREHEN METABOLIC PANEL: CPT

## 2018-12-07 PROCEDURE — 99233 SBSQ HOSP IP/OBS HIGH 50: CPT | Mod: 24,,, | Performed by: NURSE PRACTITIONER

## 2018-12-07 PROCEDURE — P9047 ALBUMIN (HUMAN), 25%, 50ML: HCPCS | Mod: JG | Performed by: NURSE PRACTITIONER

## 2018-12-07 PROCEDURE — 90935 HEMODIALYSIS ONE EVALUATION: CPT | Mod: ,,, | Performed by: NURSE PRACTITIONER

## 2018-12-07 PROCEDURE — 63600175 PHARM REV CODE 636 W HCPCS: Mod: JG | Performed by: NURSE PRACTITIONER

## 2018-12-07 PROCEDURE — 99232 SBSQ HOSP IP/OBS MODERATE 35: CPT | Mod: ,,, | Performed by: NURSE PRACTITIONER

## 2018-12-07 PROCEDURE — 63600175 PHARM REV CODE 636 W HCPCS: Performed by: STUDENT IN AN ORGANIZED HEALTH CARE EDUCATION/TRAINING PROGRAM

## 2018-12-07 PROCEDURE — 85610 PROTHROMBIN TIME: CPT

## 2018-12-07 PROCEDURE — 84100 ASSAY OF PHOSPHORUS: CPT

## 2018-12-07 RX ORDER — ALBUMIN HUMAN 250 G/1000ML
25 SOLUTION INTRAVENOUS EVERY 8 HOURS
Status: COMPLETED | OUTPATIENT
Start: 2018-12-07 | End: 2018-12-07

## 2018-12-07 RX ORDER — TACROLIMUS 1 MG/1
2 CAPSULE ORAL 2 TIMES DAILY
Status: DISCONTINUED | OUTPATIENT
Start: 2018-12-07 | End: 2018-12-08

## 2018-12-07 RX ORDER — INSULIN ASPART 100 [IU]/ML
2 INJECTION, SOLUTION INTRAVENOUS; SUBCUTANEOUS
Status: DISCONTINUED | OUTPATIENT
Start: 2018-12-07 | End: 2018-12-08

## 2018-12-07 RX ORDER — SODIUM CHLORIDE 9 MG/ML
INJECTION, SOLUTION INTRAVENOUS ONCE
Status: COMPLETED | OUTPATIENT
Start: 2018-12-07 | End: 2018-12-07

## 2018-12-07 RX ADMIN — INSULIN ASPART 2 UNITS: 100 INJECTION, SOLUTION INTRAVENOUS; SUBCUTANEOUS at 12:12

## 2018-12-07 RX ADMIN — MORPHINE 100 MG: 10 SOLUTION ORAL at 10:12

## 2018-12-07 RX ADMIN — FAMOTIDINE 20 MG: 20 TABLET ORAL at 09:12

## 2018-12-07 RX ADMIN — TIZANIDINE 2 MG: 2 TABLET ORAL at 11:12

## 2018-12-07 RX ADMIN — TACROLIMUS 2 MG: 1 CAPSULE ORAL at 06:12

## 2018-12-07 RX ADMIN — NYSTATIN 500000 UNITS: 500000 SUSPENSION ORAL at 09:12

## 2018-12-07 RX ADMIN — HEPARIN SODIUM 1000 UNITS: 1000 INJECTION, SOLUTION INTRAVENOUS; SUBCUTANEOUS at 04:12

## 2018-12-07 RX ADMIN — NYSTATIN 500000 UNITS: 500000 SUSPENSION ORAL at 12:12

## 2018-12-07 RX ADMIN — HEPARIN SODIUM 5000 UNITS: 5000 INJECTION, SOLUTION INTRAVENOUS; SUBCUTANEOUS at 09:12

## 2018-12-07 RX ADMIN — ISAVUCONAZONIUM SULFATE 372 MG: 186 CAPSULE ORAL at 09:12

## 2018-12-07 RX ADMIN — ALBUMIN HUMAN 25 G: 0.25 SOLUTION INTRAVENOUS at 10:12

## 2018-12-07 RX ADMIN — IPRATROPIUM BROMIDE AND ALBUTEROL SULFATE 3 ML: .5; 3 SOLUTION RESPIRATORY (INHALATION) at 12:12

## 2018-12-07 RX ADMIN — SODIUM BICARBONATE 650 MG TABLET 650 MG: at 09:12

## 2018-12-07 RX ADMIN — SODIUM CHLORIDE 350 ML: 0.9 INJECTION, SOLUTION INTRAVENOUS at 01:12

## 2018-12-07 RX ADMIN — SODIUM BICARBONATE 650 MG TABLET 650 MG: at 06:12

## 2018-12-07 RX ADMIN — IPRATROPIUM BROMIDE AND ALBUTEROL SULFATE 3 ML: .5; 3 SOLUTION RESPIRATORY (INHALATION) at 08:12

## 2018-12-07 RX ADMIN — TRAZODONE HYDROCHLORIDE 25 MG: 50 TABLET ORAL at 09:12

## 2018-12-07 RX ADMIN — ALBUMIN HUMAN 25 G: 0.25 SOLUTION INTRAVENOUS at 06:12

## 2018-12-07 RX ADMIN — LINEZOLID 600 MG: 600 TABLET, FILM COATED ORAL at 09:12

## 2018-12-07 RX ADMIN — URSODIOL 300 MG: 300 CAPSULE ORAL at 09:12

## 2018-12-07 RX ADMIN — HEPARIN SODIUM 5000 UNITS: 5000 INJECTION, SOLUTION INTRAVENOUS; SUBCUTANEOUS at 05:12

## 2018-12-07 RX ADMIN — SULFAMETHOXAZOLE AND TRIMETHOPRIM 1 TABLET: 400; 80 TABLET ORAL at 09:12

## 2018-12-07 RX ADMIN — TACROLIMUS 0.5 MG: 0.5 CAPSULE ORAL at 09:12

## 2018-12-07 RX ADMIN — PREDNISONE 10 MG: 5 TABLET ORAL at 09:12

## 2018-12-07 RX ADMIN — CEFEPIME 1 G: 1 INJECTION, POWDER, FOR SOLUTION INTRAMUSCULAR; INTRAVENOUS at 09:12

## 2018-12-07 NOTE — PLAN OF CARE
Problem: Physical Therapy Goal  Goal: Physical Therapy Goal  Goals to be met by: 2018     Patient will increase functional independence with mobility by performin. Supine to sit with Modified [place] -not met  2. Sit to stand transfer with [place] -not met  3. Bed to chair transfer with independence using no AD -not met  4. Gait  x150 feet with Supervision using LRAD. -not met  5. Lower extremity exercise program x20 reps per handout, with independence -not met        Outcome: Ongoing (interventions implemented as appropriate)  Goals reviewed and remain appropriate. Pt progressing towards goals.    Ramandeep Kumar, PT, DPT   2018  [phone]

## 2018-12-07 NOTE — PLAN OF CARE
Problem: Occupational Therapy Goal  Goal: Occupational Therapy Goal  Goals to be met by: 12/18/18     Patient will increase functional independence with ADLs by performing:    UE Dressing with Supervision.  LE Dressing with Minimal Assistance.  Grooming while standing at sink with Stand-by Assistance.  Toileting from toilet with Minimal Assistance for hygiene and clothing management.   Toilet transfer to toilet with Stand-by Assistance.  Upper extremity  theraband exercise program x  15 reps  with independence.        Outcome: Ongoing (interventions implemented as appropriate)  Continue with POC.   Julia ladd OT  12/7/2018

## 2018-12-07 NOTE — PROGRESS NOTES
" [facility]  Adult Nutrition  Progress Note     SUMMARY       Recommendations  Recommendation/Intervention:   1.Encourage increased PO intake and OS intake as tolerated.   2. Dietitian Following    Goals: Patient to meet >85% of EEN/EPN  Nutrition Goal Status: progressing towards goal  Communication of RD Recs: (POC)    Reason for Assessment  Reason for Assessment: RD follow-up  Diagnosis: transplant/postoperative complications(s/p OLTx 11/11)  Relevant Medical History: ESLD 2/2 alcoholic cirrhosis, ESRD on HD, DM2  Interdisciplinary Rounds: attended  General Information Comments: Patient increase in appetite since last Dietitian visit. Caregiver continues to provides encouragement and supplemental shakes. Denies N/V. HD yesterday evening 2.3 L removed. Noted wt change since last Dietitian visit likely related to fluid. NFPE completed 10/31, patient with moderate malnutrition.   Nutrition Discharge Planning: Post transplant nutrition education provided. Food safety/drug interactions emphasized. General healthy diet recommended. Dietitian name/contact information, education material left.  No other needs identified. Caregiver present.     Nutrition Risk Screen  Nutrition Risk Screen: no indicators present    Nutrition/Diet History  Patient Reported Diet/Restrictions/Preferences: low salt, general  Food Preferences: noted  Do you have any cultural, spiritual, Holiness conflicts, given your current situation?: none noted   Factors Affecting Nutritional Intake: decreased appetite    Anthropometrics  Temp: 98.2 °F (36.8 °C)  Height Method: Stated  Height: 5' 10" (177.8 cm)  Height (inches): 70 in  Weight Method: Bed Scale  Weight: 78.1 kg (172 lb 2.9 oz)  Weight (lb): 172.18 lb  Ideal Body Weight (IBW), Male: 166 lb  % Ideal Body Weight, Male (lb): 119.13 lb  BMI (Calculated): 28.4  BMI Grade: 25 - 29.9 - overweight    Lab/Procedures/Meds  Labs: Reviewed    12/07/2018    K 3.5 12/07/2018    BUN " 35 (H) 12/07/2018    CREATININE 2.8 (H) 12/07/2018    CALCIUM 8.4 (L) 12/07/2018    PHOS 3.5 12/07/2018    MG 1.8 12/07/2018    ALBUMIN 2.3 (L) 12/07/2018      ALT 19 12/07/2018    AST 14 12/07/2018    ALKPHOS 204 (H) 12/07/2018     Meds: Reviewed  Scheduled Meds:   sodium chloride 0.9%   Intravenous Once    albumin human 25%  25 g Intravenous Q8H    albuterol-ipratropium  3 mL Nebulization Q4H WAKE    ceFEPime (MAXIPIME) IVPB  1 g Intravenous Q24H    ergocalciferol  50,000 Units Oral Q7 Days    famotidine  20 mg Oral QHS    heparin (porcine)  5,000 Units Subcutaneous Q8H    insulin aspart U-100  2 Units Subcutaneous TIDWM    isavuconazonium sulfate  372 mg Oral Daily    lidocaine HCL 10 mg/ml (1%)  1 mL Intradermal Once    linezolid  600 mg Oral Q12H    nystatin  500,000 Units Oral QID (WM & HS)    predniSONE  10 mg Oral Daily     Physical Findings/Assessment  Overall Physical Appearance: loss of muscle mass, loss of subcutaneous fat  Oral/Mouth Cavity: tooth/teeth missing  Skin: edema, incision(s), skin tear(Skin Tear Lower leg)    Estimated/Assessed Needs  Weight Used For Calorie Calculations: 83.7 kg (184 lb 8.4 oz)(dosing weight)  Energy Calorie Requirements (kcal): 2110 kcal/day  Energy Need Method: Wilkes-St Jeor(x 1.25)  Protein Requirements:  g/day(1.0-1.2 g/kg)  Weight Used For Protein Calculations: 83.7 kg (184 lb 8.4 oz)(dosing weight)  Fluid Requirements (mL): 1 mL/kcal or per MD  Fluid Need Method: RDA Method  RDA Method (mL): 2110     Nutrition Prescription Ordered  Current Diet Order: Low Sodium, 2gm  Oral Nutrition Supplement: Boost Breeze, Novasource Renal, Boost Plus    Evaluation of Received Nutrient/Fluid Intake in last 24h  I/O: -9.8L since 11/23/18  Comments: LBM 12/7/18  % Intake of Estimated Energy Needs: 50 - 75 %  % Meal Intake: 50 - 75 %    Nutrition Risk  Level of Risk/Frequency of Follow-up: low(1x week)     Assessment and Plan  NFPE 10/31/18  Moderate malnutrition     Nutrition Problem  Malnutrition in the context of Chronic Illness/Injury     Related to (etiology):  Cirrhosis and poor appetite     Signs and Symptoms (as evidenced by):  Energy Intake: <75% of estimated energy requirement for 2 months  Body Fat Depletion: moderate depletion of orbitals and triceps   Muscle Mass Depletion: moderate depletion of temples, clavicle region and scapular region   Weight Loss: 14% x 2 months (per patient, unable to verify per chart review)     Nutrition Diagnosis Status:  Continues      Monitor and Evaluation  Food and Nutrient Intake: energy intake, food and beverage intake  Food and Nutrient Adminstration: diet order, enteral and parenteral nutrition administration  Knowledge/Beliefs/Attitudes: food and nutrition knowledge/skill  Physical Activity and Function: nutrition-related ADLs and IADLs  Anthropometric Measurements: weight, weight change  Biochemical Data, Medical Tests and Procedures: electrolyte and renal panel, gastrointestinal profile, glucose/endocrine profile, inflammatory profile  Nutrition-Focused Physical Findings: overall appearance     Nutrition Follow-Up  RD Follow-up?: Yes

## 2018-12-07 NOTE — SUBJECTIVE & OBJECTIVE
Interval History: Patient progressing well today, denies any issues. Patient evaluated while undergoing hemodialysis indicated for ESRD. Tolerating session with current UFR well, no complications.  Target UF 2-2.5 L as tolerated by patient.     Review of patient's allergies indicates:   Allergen Reactions    Penicillins Nausea And Vomiting and Rash     Full body rash from cefepime as well     Current Facility-Administered Medications   Medication Frequency    0.9%  NaCl infusion Once    albumin human 25% bottle 25 g Q8H    albuterol-ipratropium 2.5 mg-0.5 mg/3 mL nebulizer solution 3 mL Q4H PRN    albuterol-ipratropium 2.5 mg-0.5 mg/3 mL nebulizer solution 3 mL Q4H WAKE    bisacodyl suppository 10 mg Daily PRN    ceFEPIme injection 1 g Q24H    dextrose 50% injection 12.5 g PRN    dextrose 50% injection 25 g PRN    ergocalciferol capsule 50,000 Units Q7 Days    famotidine tablet 20 mg QHS    glucagon (human recombinant) injection 1 mg PRN    glucose chewable tablet 16 g PRN    glucose chewable tablet 24 g PRN    heparin (porcine) injection 1,000 Units PRN    heparin (porcine) injection 5,000 Units Q8H    insulin aspart U-100 pen 0-5 Units QID (AC + HS) PRN    insulin aspart U-100 pen 2 Units TIDWM    isavuconazonium sulfate Cap 372 mg Daily    lidocaine HCL 10 mg/ml (1%) injection 1 mL Once    linezolid tablet 600 mg Q12H    midodrine tablet 10 mg TID PRN    nystatin 100,000 unit/mL suspension 500,000 Units QID (WM & HS)    ondansetron injection 4 mg Q6H PRN    predniSONE tablet 10 mg Daily    Followed by    [START ON 12/10/2018] predniSONE tablet 5 mg Daily    Followed by    [START ON 12/17/2018] predniSONE tablet 2.5 mg Daily    sodium bicarbonate tablet 650 mg BID    sulfamethoxazole-trimethoprim 400-80mg per tablet 1 tablet Every Mon, Wed, Fri    tacrolimus capsule 2 mg BID    tiZANidine tablet 2 mg Q8H PRN    trazodone split tablet 25 mg Nightly PRN    trazodone split tablet 25  mg QHS    ursodiol capsule 300 mg BID    valganciclovir 50 mg/ml oral solution 100 mg Once per day on Mon Wed Fri       Objective:     Vital Signs (Most Recent):  Temp: 98.3 °F (36.8 °C) (12/07/18 1320)  Pulse: 92 (12/07/18 1214)  Resp: 16 (12/07/18 1214)  BP: (!) 142/93 (12/07/18 1340)  SpO2: 97 % (12/07/18 1214)  O2 Device (Oxygen Therapy): room air (12/07/18 1214) Vital Signs (24h Range):  Temp:  [98.2 °F (36.8 °C)-99 °F (37.2 °C)] 98.3 °F (36.8 °C)  Pulse:  [] 92  Resp:  [16-34] 16  SpO2:  [95 %-99 %] 97 %  BP: (115-148)/(76-93) 142/93     Weight: 78.1 kg (172 lb 2.9 oz) (12/07/18 0410)  Body mass index is 24.71 kg/m².  Body surface area is 1.96 meters squared.    I/O last 3 completed shifts:  In: 1480 [P.O.:1480]  Out: 150 [Urine:150]    Physical Exam   Constitutional: He is oriented to person, place, and time. He appears well-developed. He is sleeping. He is easily aroused. No distress.   HENT:   Head: Normocephalic and atraumatic.   Neck: Normal range of motion. No JVD present.   Cardiovascular: Normal rate and regular rhythm. Exam reveals no friction rub.   Pulmonary/Chest: Effort normal and breath sounds normal.   Abdominal: Soft. He exhibits no distension. There is no tenderness.   Musculoskeletal: He exhibits no edema.   Neurological: He is alert, oriented to person, place, and time and easily aroused.   More alert this AM, engaging in conversation   Skin: Skin is warm. He is not diaphoretic.       Significant Labs:  CBC:   Recent Labs   Lab 12/07/18  0647   WBC 9.14   RBC 2.90*   HGB 8.5*   HCT 27.8*      MCV 96   MCH 29.3   MCHC 30.6*     CMP:   Recent Labs   Lab 12/07/18  0646   *   CALCIUM 8.4*   ALBUMIN 2.3*   PROT 5.0*      K 3.5   CO2 24      BUN 35*   CREATININE 2.8*   ALKPHOS 204*   ALT 19   AST 14   BILITOT 2.4*

## 2018-12-07 NOTE — PLAN OF CARE
"Problem: Patient Care Overview  Goal: Plan of Care Review  Dx: Liver transplant (11/11)  hx: ESLD, ETOH abuse; Severe depression.    11/12: liver transplant; extubated. Products given in OR and HD in OR. CRRT nocturnal.  11/13: CRRT nocturnal held; lines removed. FFP x1 given. Pulled out 2 JOSE A drains.   11/14: 3 PRBC's given for hgb 6; liver US shows potential hematoma. Trend CBC. Extreme agitation/attempted to "kill self" by holding breath for as long as possible multiple times. IV ativan/haldol given. Nocturnal CRRT restarted  Potential washout  To be determined.    11/16: OR for exlap and washout--1U PRBCs and 1 plts; sent back to OR--3 U PRBCs, 1FFP, 1cryo; abd open and wound vac placed  11/17: CT chest, abd, pelvis, levo off 2 units PRBCs, 2 units platelets, 1 unit FFP   11/18: OR for closure, extubated   11/19: clear liquid diet   11/20: TPN/Lipids d/c'd, renal diet, transfer orders  11/21: Head and chest CT  11/22: Pt to IR for drain placement  11/23: HD trial  11/25: HD 2 Liters removed  Nursing:   -160          Outcome: Ongoing (interventions implemented as appropriate)  -Pt AAOx4  -Vital signs stable  -BG monitoring ACHS; BG HS was 109, no SSI coverage needed  -Chevron incision CDI; ECTOR with staples  -PRN Tizanidine given x1 so far thus shift  -Fall precautions maintained, non skid socks worn  -No acute events/falls/injuries this shift  -Mother at bedside  -Pt aware to call for help with ambulating.    -Bed lowered, locked, siderails up x2, and call bell in reach.     See flowsheet for assessment findings.  Will continue to monitor pt.         "

## 2018-12-07 NOTE — SUBJECTIVE & OBJECTIVE
Scheduled Meds:   sodium chloride 0.9%   Intravenous Once    albumin human 25%  25 g Intravenous Q8H    albuterol-ipratropium  3 mL Nebulization Q4H WAKE    ceFEPime (MAXIPIME) IVPB  1 g Intravenous Q24H    ergocalciferol  50,000 Units Oral Q7 Days    famotidine  20 mg Oral QHS    heparin (porcine)  5,000 Units Subcutaneous Q8H    insulin aspart U-100  2 Units Subcutaneous TIDWM    isavuconazonium sulfate  372 mg Oral Daily    lidocaine HCL 10 mg/ml (1%)  1 mL Intradermal Once    linezolid  600 mg Oral Q12H    nystatin  500,000 Units Oral QID (WM & HS)    predniSONE  10 mg Oral Daily    Followed by    [START ON 12/10/2018] predniSONE  5 mg Oral Daily    Followed by    [START ON 12/17/2018] predniSONE  2.5 mg Oral Daily    sodium bicarbonate  650 mg Oral BID    sulfamethoxazole-trimethoprim 400-80mg  1 tablet Oral Every Mon, Wed, Fri    tacrolimus  2 mg Oral BID    traZODone  25 mg Oral QHS    ursodiol  300 mg Oral BID    valganciclovir 50 mg/ml  100 mg Oral Once per day on Mon Wed Fri     Continuous Infusions:  PRN Meds:albuterol-ipratropium, bisacodyl, dextrose 50%, dextrose 50%, glucagon (human recombinant), glucose, glucose, heparin (porcine), insulin aspart U-100, midodrine, ondansetron, tiZANidine, traZODone    Review of Systems   Constitutional: Positive for activity change, appetite change (poor) and fatigue. Negative for fever.   HENT: Positive for voice change. Negative for facial swelling.    Eyes: Negative.    Respiratory: Positive for shortness of breath (w exertion). Negative for apnea, cough, choking, chest tightness and wheezing.    Cardiovascular: Negative.  Negative for chest pain, palpitations and leg swelling.   Gastrointestinal: Positive for abdominal distention and abdominal pain. Negative for constipation, diarrhea, nausea and vomiting.   Genitourinary: Positive for decreased urine volume. Negative for difficulty urinating, dysuria, hematuria and urgency.   Musculoskeletal:  Negative.  Negative for back pain, gait problem, neck pain and neck stiffness.   Skin: Positive for wound. Negative for color change and pallor.   Allergic/Immunologic: Positive for immunocompromised state.   Neurological: Positive for weakness. Negative for dizziness, seizures, light-headedness and headaches.   Psychiatric/Behavioral: Positive for decreased concentration and dysphoric mood. Negative for behavioral problems, confusion, hallucinations, sleep disturbance and suicidal ideas. The patient is not nervous/anxious.      Objective:     Vital Signs (Most Recent):  Temp: 98.3 °F (36.8 °C) (12/07/18 1320)  Pulse: 92 (12/07/18 1214)  Resp: 16 (12/07/18 1214)  BP: (!) 140/87 (12/07/18 1500)  SpO2: 97 % (12/07/18 1214) Vital Signs (24h Range):  Temp:  [98.2 °F (36.8 °C)-99 °F (37.2 °C)] 98.3 °F (36.8 °C)  Pulse:  [] 92  Resp:  [16-34] 16  SpO2:  [95 %-97 %] 97 %  BP: (115-148)/(76-94) 140/87     Weight: 78.1 kg (172 lb 2.9 oz)  Body mass index is 24.71 kg/m².    Intake/Output - Last 3 Shifts       12/05 0700 - 12/06 0659 12/06 0700 - 12/07 0659 12/07 0700 - 12/08 0659    P.O. 800 1130     Blood 250      Other 750      Total Intake(mL/kg) 1800 (23.1) 1130 (14.5)     Urine (mL/kg/hr) 0 (0) 150 (0.1)     Emesis/NG output 0 0     Other 3125 0     Stool 0 0     Blood 0 0     Total Output 3125 150     Net -1325 +980            Urine Occurrence 0 x 0 x     Stool Occurrence 2 x 2 x 0 x    Emesis Occurrence 0 x 0 x           Physical Exam   Constitutional: He is oriented to person, place, and time. He appears well-developed. No distress.   HENT:   Head: Normocephalic and atraumatic.   White drainage noted to back of throat and roof on mouth, voice hoarse   Eyes: Pupils are equal, round, and reactive to light. Scleral icterus is present.   Neck: Normal range of motion. Neck supple. No JVD present.   Cardiovascular: Normal rate, regular rhythm and normal heart sounds.   No murmur heard.  Pulmonary/Chest: Effort normal.  No respiratory distress. He has decreased breath sounds in the right lower field and the left lower field. He has no wheezes. He exhibits no tenderness.   Taking short breaths, IS up to 500   Abdominal: Soft. Bowel sounds are normal. He exhibits no distension. There is tenderness.   Chevron inc ECTOR w/ staples  RLQ JOSE A drain and percutaneous drain site w suture CDI.    Musculoskeletal: Normal range of motion. He exhibits no tenderness. Edema: 2+ BLE.   Neurological: He is alert and oriented to person, place, and time. He has normal reflexes.   Skin: Skin is warm and dry. Capillary refill takes 2 to 3 seconds. He is not diaphoretic.   Psychiatric: His affect is labile. He is slowed. Cognition and memory are impaired.   Nursing note and vitals reviewed.      Laboratory:  Immunosuppressants         Stop Route Frequency     tacrolimus capsule 2 mg      -- Oral 2 times daily        CBC:   Recent Labs   Lab 12/07/18  0647   WBC 9.14   RBC 2.90*   HGB 8.5*   HCT 27.8*      MCV 96   MCH 29.3   MCHC 30.6*     CMP:   Recent Labs   Lab 12/07/18  0646   *   CALCIUM 8.4*   ALBUMIN 2.3*   PROT 5.0*      K 3.5   CO2 24      BUN 35*   CREATININE 2.8*   ALKPHOS 204*   ALT 19   AST 14   BILITOT 2.4*     Coagulation:   Recent Labs   Lab 12/07/18  0646   INR 1.2     Tacrolimus Lvl   Date Value Ref Range Status   12/07/2018 1.9 (L) 5.0 - 15.0 ng/mL Final     Comment:     Testing performed by Liquid Chromatography-Tandem  Mass Spectrometry (LC-MS/MS).  This test was developed and its performance characteristics  determined by [facility], Department of Pathology  and Laboratory Medicine in a manner consistent with CLIA  requirements. It has not been cleared or approved by the [place]  Food and Drug Administration.  This test is used for clinical   purposes.  It should not be regarded as investigational or for  research.     12/06/2018 3.3 (L) 5.0 - 15.0 ng/mL Final     Comment:     Testing performed by  Liquid Chromatography-Tandem  Mass Spectrometry (LC-MS/MS).  This test was developed and its performance characteristics  determined by [facility], Department of Pathology  and Laboratory Medicine in a manner consistent with CLIA  requirements. It has not been cleared or approved by the [place]  Food and Drug Administration.  This test is used for clinical   purposes.  It should not be regarded as investigational or for  research.     12/05/2018 6.7 5.0 - 15.0 ng/mL Final     Comment:     Testing performed by Liquid Chromatography-Tandem  Mass Spectrometry (LC-MS/MS).  This test was developed and its performance characteristics  determined by [facility], Department of Pathology  and Laboratory Medicine in a manner consistent with CLIA  requirements. It has not been cleared or approved by the [place]  Food and Drug Administration.  This test is used for clinical   purposes.  It should not be regarded as investigational or for  research.     12/04/2018 14.1 5.0 - 15.0 ng/mL Final     Comment:     Testing performed by Liquid Chromatography-Tandem  Mass Spectrometry (LC-MS/MS).  This test was developed and its performance characteristics  determined by [facility], Department of Pathology  and Laboratory Medicine in a manner consistent with CLIA  requirements. It has not been cleared or approved by the [place]  Food and Drug Administration.  This test is used for clinical   purposes.  It should not be regarded as investigational or for  research.           Labs within the past 24 hours have been reviewed.    Diagnostic Results:  None

## 2018-12-07 NOTE — ASSESSMENT & PLAN NOTE
- DEL for over 2 weeks prior to transplant. Nephrology was consulted.     - Post transplant, Nephrology cont to follow.  He received HD last on 11/30 for metabolic clearance and fluid management.     - Attempted Lasix challenge (160 mg) x1 on 11/28- pt diuresed 105 cc.    - HD 12/3- removed 1.5 liter.  Cont strict I/O's.  Monitor closely.  - Lasix challenge 12/4 with minimal UOP  - Last HD 12/5- 2.3L removed.    - Crackles to mell bases and dependent edema.  Lasix 100mg and albumin x3 12/6/18.  - plan for Hd today.  Monitor.

## 2018-12-07 NOTE — ASSESSMENT & PLAN NOTE
- muscle wasting noted.  Appetite on and off.  Cont supplements.  Dietician following.  Apprec recs.

## 2018-12-07 NOTE — ASSESSMENT & PLAN NOTE
- Pt remains significantly debilitated.   - PT/OT for strengthening.   - Currently will need SNF for placement and further rehabilitation.  Insurance does not cover Rehab.

## 2018-12-07 NOTE — PROGRESS NOTES
Pt arrived via stretcher per pt's escort,  Maintainance HD initiated via RT IJ Tunneled catheter without difficulty, good flows obtained.

## 2018-12-07 NOTE — ASSESSMENT & PLAN NOTE
- Pt with intermittent confusion since transplant was improving slowly.   - Pt with some lethargy and confusion on 11/21. Head CT negative.   - AAOx3. More alert and awake on 11/27.   - AAOx4 on 11/29.  He was happy he could remember what day it is today.    - confusion worse over the wkd.  Seroquel d/c'd 11/30/18.  Trazodone for insomnia ordered w PRN dose.    - re consulted psych, awaiting recommendations.   - delirium could be d/t rising prograf.  AMS improved since holding Prograf.    - CT head 12/4 with no acute findings.   - Restarted Prograf 12/6- will need to monitor mental status closely.

## 2018-12-07 NOTE — PROGRESS NOTES
[facility]  Liver Transplant  Progress Note    Patient Name: Femi Enciso  MRN: [id number]  Admission Date: 10/27/2018  Hospital Length of Stay: 41 days  Code Status: Full Code  Primary Care Provider: Primary Doctor No  Post-Operative Day: 26    ORGAN:   LIVER  Disease Etiology: Alcoholic Cirrhosis  Donor Type:    - Brain Death  CDC High Risk:   No  Donor CMV Status:   Donor CMV Status: Positive  Donor HBcAB:   Negative  Donor HCV Status:   Negative  Donor HBV JAVIER: Negative  Donor HCV JAVIER: Negative  Whole or Partial: Whole Liver  Biliary Anastomosis: End to End  Arterial Anatomy: Standard  Subjective:     History of Present Illness:  Femi Enciso is a 53yr old male with PMH of acute ETOH hepatitis and DEL/ATN evolved to ESRD requiring HD (not candidate for KTX). He is now s/p liver transplant (SM induction, DBD, CMV D+/R-). Transplant surgery noted severe collateralization, adrenal gland firmly adhered to liver. Bare area of adrenal gland required several stitches and packing to obtain fair hemostasis (EBL 15L). OR take back  for bleeding from R adrenal bed in AM (significant clot in retroperitoneum, posterior to R hepatic lobe, tract posterior to the R kidney containing significant clot, small bleeding area of retroperitoneal fat) then returned to surgery same day for hemorrhaging closed with wound vac with plans to return to OR 24-48 hours for closure (retroperitoneal /retrorenal/retrocolic hematoma on the right ). Raw retroperitoneal bleeding. Suture ligatures placed, argon beam cautery, evarest placed in retroperitoneum behind cava and right kidney. Significant oozing from upper right adrenal gland, not amenable to ligation, that portion of the adrenal gland was resected with cautery). OR take back  for washout and incisional closure, no significant bleeding or hematoma found. OR cultures   from body fluid grew enterococcus faecium VRE. ID was consulted,  started on  daptomycin for VRE treatment and cefepime/flagyl to cover for IA ty in bile. Patient with significant leukocytosis with peak on  at 48K prompting drainage of R subphrenic fluid collection, perc drain placed, culture grew VRE. Stroke code called  for lethargy and unresponsive, CT head without evidence of acute ischemic changes and CTA chest negative, vascular neurology was consulted recommended MRI brain, but patient's AMS improved shortly after event. Nephrology consulted for ESRD requiring HD. Patient overall improved on antibiotic regimen, leukocytosis and AMS improved. He was transferred to TSU on POD #15.     Hospital Course:  Interval History:  No acute events overnight. Mental status improving daily. Family agrees mental status better.  Mental status improved w decreased prograf level.  Prograf restarted yesterday.  Will need to monitor closely.  LFTs at this time wnl.  T bili 2.4.  T bili going into transplant 37.  Per family, he is sleeping well on Trazodone on  dose to 25 mg.  Psych was consulted and apprec recs.  Pt taking Zanaflex as needed w good mgt of back pain.  T bili remains 2.4.  Cr remains elevated.  HD today.  Pitting edema to BLE slightly better.  Last transfused 1 u PRBC on  w appropriate response.  WBC has normalized.  Blood cx / NGTD, urine cx /4 NGTD.  Cefepime x 1 week (end date 18).  Cont Linezolid for VRE thru 18.    Pt remains afebrile.  Monitor.         Scheduled Meds:   sodium chloride 0.9%   Intravenous Once    albumin human 25%  25 g Intravenous Q8H    albuterol-ipratropium  3 mL Nebulization Q4H WAKE    ceFEPime (MAXIPIME) IVPB  1 g Intravenous Q24H    ergocalciferol  50,000 Units Oral Q7 Days    famotidine  20 mg Oral QHS    heparin (porcine)  5,000 Units Subcutaneous Q8H    insulin aspart U-100  2 Units Subcutaneous TIDWM    isavuconazonium sulfate  372 mg Oral Daily    lidocaine HCL 10 mg/ml (1%)  1 mL Intradermal Once     linezolid  600 mg Oral Q12H    nystatin  500,000 Units Oral QID (WM & HS)    predniSONE  10 mg Oral Daily    Followed by    [START ON 12/10/2018] predniSONE  5 mg Oral Daily    Followed by    [START ON 12/17/2018] predniSONE  2.5 mg Oral Daily    sodium bicarbonate  650 mg Oral BID    sulfamethoxazole-trimethoprim 400-80mg  1 tablet Oral Every Mon, Wed, Fri    tacrolimus  2 mg Oral BID    traZODone  25 mg Oral QHS    ursodiol  300 mg Oral BID    valganciclovir 50 mg/ml  100 mg Oral Once per day on Mon Wed Fri     Continuous Infusions:  PRN Meds:albuterol-ipratropium, bisacodyl, dextrose 50%, dextrose 50%, glucagon (human recombinant), glucose, glucose, heparin (porcine), insulin aspart U-100, midodrine, ondansetron, tiZANidine, traZODone    Review of Systems   Constitutional: Positive for activity change, appetite change (poor) and fatigue. Negative for fever.   HENT: Positive for voice change. Negative for facial swelling.    Eyes: Negative.    Respiratory: Positive for shortness of breath (w exertion). Negative for apnea, cough, choking, chest tightness and wheezing.    Cardiovascular: Negative.  Negative for chest pain, palpitations and leg swelling.   Gastrointestinal: Positive for abdominal distention and abdominal pain. Negative for constipation, diarrhea, nausea and vomiting.   Genitourinary: Positive for decreased urine volume. Negative for difficulty urinating, dysuria, hematuria and urgency.   Musculoskeletal: Negative.  Negative for back pain, gait problem, neck pain and neck stiffness.   Skin: Positive for wound. Negative for color change and pallor.   Allergic/Immunologic: Positive for immunocompromised state.   Neurological: Positive for weakness. Negative for dizziness, seizures, light-headedness and headaches.   Psychiatric/Behavioral: Positive for decreased concentration and dysphoric mood. Negative for behavioral problems, confusion, hallucinations, sleep disturbance and suicidal ideas.  The patient is not nervous/anxious.      Objective:     Vital Signs (Most Recent):  Temp: 98.3 °F (36.8 °C) (12/07/18 1320)  Pulse: 92 (12/07/18 1214)  Resp: 16 (12/07/18 1214)  BP: (!) 140/87 (12/07/18 1500)  SpO2: 97 % (12/07/18 1214) Vital Signs (24h Range):  Temp:  [98.2 °F (36.8 °C)-99 °F (37.2 °C)] 98.3 °F (36.8 °C)  Pulse:  [] 92  Resp:  [16-34] 16  SpO2:  [95 %-97 %] 97 %  BP: (115-148)/(76-94) 140/87     Weight: 78.1 kg (172 lb 2.9 oz)  Body mass index is 24.71 kg/m².    Intake/Output - Last 3 Shifts       12/05 0700 - 12/06 0659 12/06 0700 - 12/07 0659 12/07 0700 - 12/08 0659    P.O. 800 1130     Blood 250      Other 750      Total Intake(mL/kg) 1800 (23.1) 1130 (14.5)     Urine (mL/kg/hr) 0 (0) 150 (0.1)     Emesis/NG output 0 0     Other 3125 0     Stool 0 0     Blood 0 0     Total Output 3125 150     Net -1325 +980            Urine Occurrence 0 x 0 x     Stool Occurrence 2 x 2 x 0 x    Emesis Occurrence 0 x 0 x           Physical Exam   Constitutional: He is oriented to person, place, and time. He appears well-developed. No distress.   HENT:   Head: Normocephalic and atraumatic.   White drainage noted to back of throat and roof on mouth, voice hoarse   Eyes: Pupils are equal, round, and reactive to light. Scleral icterus is present.   Neck: Normal range of motion. Neck supple. No JVD present.   Cardiovascular: Normal rate, regular rhythm and normal heart sounds.   No murmur heard.  Pulmonary/Chest: Effort normal. No respiratory distress. He has decreased breath sounds in the right lower field and the left lower field. He has no wheezes. He exhibits no tenderness.   Taking short breaths, IS up to 500   Abdominal: Soft. Bowel sounds are normal. He exhibits no distension. There is tenderness.   Chevron inc ECTOR w/ staples  RLQ JOSE A drain and percutaneous drain site w suture CDI.    Musculoskeletal: Normal range of motion. He exhibits no tenderness. Edema: 2+ BLE.   Neurological: He is alert and oriented  to person, place, and time. He has normal reflexes.   Skin: Skin is warm and dry. Capillary refill takes 2 to 3 seconds. He is not diaphoretic.   Psychiatric: His affect is labile. He is slowed. Cognition and memory are impaired.   Nursing note and vitals reviewed.      Laboratory:  Immunosuppressants         Stop Route Frequency     tacrolimus capsule 2 mg      -- Oral 2 times daily        CBC:   Recent Labs   Lab 12/07/18  0647   WBC 9.14   RBC 2.90*   HGB 8.5*   HCT 27.8*      MCV 96   MCH 29.3   MCHC 30.6*     CMP:   Recent Labs   Lab 12/07/18  0646   *   CALCIUM 8.4*   ALBUMIN 2.3*   PROT 5.0*      K 3.5   CO2 24      BUN 35*   CREATININE 2.8*   ALKPHOS 204*   ALT 19   AST 14   BILITOT 2.4*     Coagulation:   Recent Labs   Lab 12/07/18  0646   INR 1.2     Tacrolimus Lvl   Date Value Ref Range Status   12/07/2018 1.9 (L) 5.0 - 15.0 ng/mL Final     Comment:     Testing performed by Liquid Chromatography-Tandem  Mass Spectrometry (LC-MS/MS).  This test was developed and its performance characteristics  determined by [facility], Department of Pathology  and Laboratory Medicine in a manner consistent with CLIA  requirements. It has not been cleared or approved by the [place]  Food and Drug Administration.  This test is used for clinical   purposes.  It should not be regarded as investigational or for  research.     12/06/2018 3.3 (L) 5.0 - 15.0 ng/mL Final     Comment:     Testing performed by Liquid Chromatography-Tandem  Mass Spectrometry (LC-MS/MS).  This test was developed and its performance characteristics  determined by [facility], Department of Pathology  and Laboratory Medicine in a manner consistent with CLIA  requirements. It has not been cleared or approved by the [place]  Food and Drug Administration.  This test is used for clinical   purposes.  It should not be regarded as investigational or for  research.     12/05/2018 6.7 5.0 - 15.0 ng/mL Final     Comment:      Testing performed by Liquid Chromatography-Tandem  Mass Spectrometry (LC-MS/MS).  This test was developed and its performance characteristics  determined by [facility], Department of Pathology  and Laboratory Medicine in a manner consistent with CLIA  requirements. It has not been cleared or approved by the [place]  Food and Drug Administration.  This test is used for clinical   purposes.  It should not be regarded as investigational or for  research.     12/04/2018 14.1 5.0 - 15.0 ng/mL Final     Comment:     Testing performed by Liquid Chromatography-Tandem  Mass Spectrometry (LC-MS/MS).  This test was developed and its performance characteristics  determined by [facility], Department of Pathology  and Laboratory Medicine in a manner consistent with CLIA  requirements. It has not been cleared or approved by the [place]  Food and Drug Administration.  This test is used for clinical   purposes.  It should not be regarded as investigational or for  research.           Labs within the past 24 hours have been reviewed.    Diagnostic Results:  None    Assessment/Plan:     * S/P liver transplant    - 53 year old male with history of ESLD 2/2 ETOH cirrhosis who is s/p liver transplant c/b take-back x 3 for bleeding most recently 11/18.  - LFTs now improving slowly.  T bili was 37.6 day of transplant.  - Pt remains with significant debility. Pt will need SNF placement when medically stable.   - Appetite slowly improving.  Tolerating supplements.  Encourage PO intake.   - Drain placed during take back. Drain placed to intra-abdominal abscess on 11/22.   - Fluid from collection noted with enterococcus VRE. Cont with antibxs per ID.  De escalated to PO on 11/29/18.    - Abdominal pain well controlled, and having BMs.    - HD per nephrology.   - Muscle relaxer started and will hold off on oxycodone for now.   - LFTs remain stable.  T bili 2.4, was 37 prior to txp.         Delirium    - Pt with intermittent  confusion since transplant was improving slowly.   - Pt with some lethargy and confusion on 11/21. Head CT negative.   - AAOx3. More alert and awake on 11/27.   - AAOx4 on 11/29.  He was happy he could remember what day it is today.    - confusion worse over the wkd.  Seroquel d/c'd 11/30/18.  Trazodone for insomnia ordered w PRN dose.    - re consulted psych, awaiting recommendations.   - delirium could be d/t rising prograf.  AMS improved since holding Prograf.    - CT head 12/4 with no acute findings.   - Restarted Prograf 12/6- will need to monitor mental status closely.     Moderate malnutrition    - muscle wasting noted.  Appetite on and off.  Cont supplements.  Dietician following.  Apprec recs.         Acute renal failure with acute tubular necrosis superimposed on stage 3 chronic kidney disease    - Renal function slow to recover post-op.   - Nephrology consulted for management.   - Cont with HD as per nephrology recs.  Apprec recs.    - HD on 11/27 w/ 2L off. Pt tolerated well.    - Lasix 160 mg challenge 11/28 w/ minimal output.    - HD performed on 12/3 at bedside. 1.5 removed, tolerated.   - Strict I&Os.      Intra-abdominal abscess    - Significant increase in WBC post-op.   - OR cultures from 11/18 noted with enterococcus VRE.   - Abdominal CT performed at that time with fluid collection and drain placed on 11/22 for drainage.   - Intra-abdominal abscess culture also with enterococcus VRE.   - ID consulted and pt placed on antibxs for treatment. Pt was on Cefepime, Daptomycin, and Fluconazole for treatment.    - Pt remains with RLQ drains (one JOSE A and one Percutaneous).  One JOSE A removed 11/28.  Perc drain in placed draining tan, clear drainage.  WBC slowly improving.   - Liver US on 11/26 reviewed.   - Abdominal CT performed 11/27 and reviewed. Fluid collection noted with improvement on CT.  ID following and reassured by findings.    - Dapto, Cefepime and Flagyl changed 11/30/18 to Linezolid 600 mg PO q  12 hr x 10 days per ID.     - added back cefepime after thora.  ID re consulted.  -Plan to complete Zyvox x 10 days per ID thru 12/9/18. Monitor.      Long-term use of immunosuppressant medication    - Cont with prograf. Draw prograf level daily and adjust dose as needed to maintain a therapeutic level.        At risk for opportunistic infections    - Cont with bactrim and valcyte for protection against opportunistic infections.   - cont Isavuconazonium.     Leucocytosis    - See intra-abdominal abscess.  - wbc improving, cont w delirium.    - Repeat cx 12/4 with NGTD.  Cont Linezolid and Cefepime per ID.         Prophylactic immunotherapy    - See long term immunosuppression.        Weakness    - Pt remains significantly debilitated.   - PT/OT for strengthening.   - Currently will need SNF for placement and further rehabilitation.  Insurance does not cover Rehab.       Pleural effusion associated with hepatic disorder    - c/o increased pain w deep breath today to right side.  CXR showed increased opacity in the inferior hemothorax on the right side since 11/26/2018.  Pulse ox 97-99% on RA.  Cont to monitor    - continues to have fluid to RLL.  WBC remains elevated.    - thoracentesis performed on 11/30. Fluid noted with 4k WBC, 93 SEGS. cx no growth to date.  Cefepime added for treatment.  - Chest CT showing right pleural effusion improved, left w increased pleural effusion.   - Per ID, will treat empyema w Cefepime thru 12/8.     Type 2 diabetes mellitus without complication    - Endocrine consulted for management. Appreciate recs.        Anemia of chronic disease    - H&H trending down. Transfused 1 unit of PRBC with HD- . Monitor with daily labs.   - Chest/Abd/pelvis CT 12/4- no fluid to be drainged per transplant surgeon.  No source of bleeding.     - last liver US 11/27. Evolving peritransplant hematomas with anechoic fluid collection adjacent to the right hepatic lobe.  Small volume perihepatic free fluid.      DEL (acute kidney injury)    - DEL for over 2 weeks prior to transplant. Nephrology was consulted.     - Post transplant, Nephrology cont to follow.  He received HD last on 11/30 for metabolic clearance and fluid management.     - Attempted Lasix challenge (160 mg) x1 on 11/28- pt diuresed 105 cc.    - HD 12/3- removed 1.5 liter.  Cont strict I/O's.  Monitor closely.  - Lasix challenge 12/4 with minimal UOP  - Last HD 12/5- 2.3L removed.    - Crackles to mell bases and dependent edema.  Lasix 100mg and albumin x3 12/6/18.  - plan for Hd today.  Monitor.      Hepatorenal syndrome    - DEL past 2 weeks.  Pt on Midodrine.  Last HD 11/9/18- 0 fluid removed 2/2 hypotension.  - Nephrology following for HD-  Pt cont to have hypotenstion worse w standing.  Midodrine 10 mg PRN for hypotension during HD ordered.           VTE Risk Mitigation (From admission, onward)        Ordered     heparin (porcine) injection 5,000 Units  Every 8 hours      11/30/18 1149     heparin (porcine) injection 1,000 Units  As needed (PRN)      11/25/18 1428     IP VTE HIGH RISK PATIENT  Once      11/11/18 1208          The patients clinical status was discussed at multidisplinary rounds, involving transplant surgery, transplant medicine, pharmacy, nursing, nutrition, and social work    Discharge Planning:  No plan for discharge.    Hilary Galarza NP  Liver Transplant  [facility]

## 2018-12-07 NOTE — ASSESSMENT & PLAN NOTE
- H&H trending down. Transfused 1 unit of PRBC with HD- . Monitor with daily labs.   - Chest/Abd/pelvis CT 12/4- no fluid to be drainged per transplant surgeon.  No source of bleeding.     - last liver US 11/27. Evolving peritransplant hematomas with anechoic fluid collection adjacent to the right hepatic lobe.  Small volume perihepatic free fluid.

## 2018-12-07 NOTE — ASSESSMENT & PLAN NOTE
- 53 year old male with history of ESLD 2/2 ETOH cirrhosis who is s/p liver transplant c/b take-back x 3 for bleeding most recently 11/18.  - LFTs now improving slowly.  T bili was 37.6 day of transplant.  - Pt remains with significant debility. Pt will need SNF placement when medically stable.   - Appetite slowly improving.  Tolerating supplements.  Encourage PO intake.   - Drain placed during take back. Drain placed to intra-abdominal abscess on 11/22.   - Fluid from collection noted with enterococcus VRE. Cont with antibxs per ID.  De escalated to PO on 11/29/18.    - Abdominal pain well controlled, and having BMs.    - HD per nephrology.   - Muscle relaxer started and will hold off on oxycodone for now.   - LFTs remain stable.  T bili 2.4, was 37 prior to txp.

## 2018-12-07 NOTE — PROGRESS NOTES
"[facility]  Endocrinology  Progress Note    Admit Date: 10/27/2018     Reason for Consult: Management of Hyperglycemia and type 2 DM    Surgical Procedure and Date: liver transplant 18; exploratory lap and liver biopsy on 18      Diabetes diagnosis year: ~ 3-4 years ago     Home Diabetes Medications:  none; previously on metformin 1000 mg BID    How often checking glucose at home? Not checking  BG readings on regimen: n/a  Hypoglycemia on the regimen?  n/a  Missed doses on regimen? n/a    Diabetes Complications include:     DORIAN    Complicating diabetes co morbidities:   CIRRHOSIS and Glucocorticoid use       HPI:   Patient is a 53 y.o. male with a diagnosis of ESLD secondary to ETOH abuse and DEL with ESRD with RRT. Patient is now s/p liver transplant. Endocrinology consulted for BG management.       Lab Results   Component Value Date    HGBA1C 4.4 2018             Interval HPI:   Overnight events:  Remains in TSU. NAEON. BG at or slightly below goal with scheduled insulin; not requiring correction scale. Prednisone 10 mg daily.   Eatin%  Nausea: No  Hypoglycemia and intervention: No  Fever: No  TPN and/or TF: No      /80   Pulse 74   Temp 98.2 °F (36.8 °C)   Resp (!) 22   Ht 5' 10" (1.778 m)   Wt 78.1 kg (172 lb 2.9 oz)   SpO2 97%   BMI 24.71 kg/m²      Labs Reviewed and Include    Recent Labs   Lab 18  0646   *   CALCIUM 8.4*   ALBUMIN 2.3*   PROT 5.0*      K 3.5   CO2 24      BUN 35*   CREATININE 2.8*   ALKPHOS 204*   ALT 19   AST 14   BILITOT 2.4*     Lab Results   Component Value Date    WBC 9.14 2018    HGB 8.5 (L) 2018    HCT 27.8 (L) 2018    MCV 96 2018     2018     No results for input(s): TSH, FREET4 in the last 168 hours.  Lab Results   Component Value Date    HGBA1C 4.4 2018       Nutritional status:   Body mass index is 24.71 kg/m².  Lab Results   Component Value Date    ALBUMIN " 2.3 (L) 12/07/2018    ALBUMIN 1.6 (L) 12/06/2018    ALBUMIN 1.7 (L) 12/05/2018     Lab Results   Component Value Date    PREALBUMIN 7 (L) 10/27/2018       Estimated Creatinine Clearance: 31.5 mL/min (A) (based on SCr of 2.8 mg/dL (H)).    Accu-Checks  Recent Labs     12/04/18  1148 12/04/18  1716 12/04/18  2059 12/05/18  0744 12/05/18  1148 12/05/18  1650 12/06/18  0736 12/06/18  1155 12/06/18  1718 12/06/18  2116   POCTGLUCOSE 213* 171* 152* 156* 208* 137* 138* 148* 167* 109       Current Medications and/or Treatments Impacting Glycemic Control  Immunotherapy:    Immunosuppressants         Stop Route Frequency     tacrolimus capsule 0.5 mg      -- Oral 2 times daily        Steroids:   Hormones (From admission, onward)    Start     Stop Route Frequency Ordered    12/17/18 0900  predniSONE tablet 2.5 mg      12/24 0859 Oral Daily 11/26/18 1115    12/10/18 0900  predniSONE tablet 5 mg      12/17 0859 Oral Daily 11/26/18 1115    12/03/18 0900  predniSONE tablet 10 mg      12/10 0859 Oral Daily 11/26/18 1115    11/09/18 1345  methylPREDNISolone sodium succinate injection 500 mg  (Med - Immunosuppression Induction Therapy (Methylprednisolone))      11/10 0144 IV Once pre-op 11/09/18 1236        Pressors:    Autonomic Drugs (From admission, onward)    Start     Stop Route Frequency Ordered    11/30/18 1058  midodrine tablet 10 mg     Question:  Is the patient competent?  Answer:  Yes    -- Oral 3 times daily PRN 11/30/18 0958        Hyperglycemia/Diabetes Medications:   Antihyperglycemics (From admission, onward)    Start     Stop Route Frequency Ordered    12/04/18 1130  insulin aspart U-100 pen 3 Units      -- SubQ 3 times daily with meals 12/04/18 1059    11/27/18 1122  insulin aspart U-100 pen 0-5 Units      -- SubQ Before meals & nightly PRN 11/27/18 1023          ASSESSMENT and PLAN    * S/P liver transplant    Managed per LTS.   avoid hypoglycemia  Optimize BG control for surgical wound healing.     Lab Results    Component Value Date    ALT 19 12/06/2018    AST 15 12/06/2018     (H) 11/26/2018    ALKPHOS 259 (H) 12/06/2018    BILITOT 2.4 (H) 12/06/2018            Type 2 diabetes mellitus without complication    BG goal 140-180    decrease Novolog 2 units with meals  Low dose correction scale given kidney function  BG monitoring AC/HS    Discharge plans- TBD     Adrenal cortical steroids causing adverse effect in therapeutic use    On standard steroid taper per transplant team; may elevate BG readings         DEL (acute kidney injury)    Avoid insulin stacking and hypoglycemia.  Nephrology following  Lab Results   Component Value Date    CREATININE 2.3 (H) 12/06/2018            Prophylactic immunotherapy    May increase insulin resistance.            Tessa Falk NP  Endocrinology  [facility]

## 2018-12-07 NOTE — ASSESSMENT & PLAN NOTE
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   - s/p OHLTx 11/11/2018; intra-op SLED  - remaining oliguric   -hemodynamically stable, off pressors    Plan  -Will provide dialysis today for metabolic clearance and volume management.   -Target UF 2-2.5 L as tolerated by patient.   -Improved mental status this AM, patient engaging in coversation.  -Closely follow strict Is & Os and serial RFPs  -Avoid NSAIDs, contrast, nephrotoxins   -Daily weights  -Renally dose medications to current GFR  -Will discuss with staff

## 2018-12-07 NOTE — SUBJECTIVE & OBJECTIVE
"Interval HPI:   Overnight events:  Remains in TSU. NAEON. BG at or slightly below goal with scheduled insulin; not requiring correction scale. Prednisone 10 mg daily.   Eatin%  Nausea: No  Hypoglycemia and intervention: No  Fever: No  TPN and/or TF: No      /80   Pulse 74   Temp 98.2 °F (36.8 °C)   Resp (!) 22   Ht 5' 10" (1.778 m)   Wt 78.1 kg (172 lb 2.9 oz)   SpO2 97%   BMI 24.71 kg/m²     Labs Reviewed and Include    Recent Labs   Lab 18  0646   *   CALCIUM 8.4*   ALBUMIN 2.3*   PROT 5.0*      K 3.5   CO2 24      BUN 35*   CREATININE 2.8*   ALKPHOS 204*   ALT 19   AST 14   BILITOT 2.4*     Lab Results   Component Value Date    WBC 9.14 2018    HGB 8.5 (L) 2018    HCT 27.8 (L) 2018    MCV 96 2018     2018     No results for input(s): TSH, FREET4 in the last 168 hours.  Lab Results   Component Value Date    HGBA1C 4.4 2018       Nutritional status:   Body mass index is 24.71 kg/m².  Lab Results   Component Value Date    ALBUMIN 2.3 (L) 2018    ALBUMIN 1.6 (L) 2018    ALBUMIN 1.7 (L) 2018     Lab Results   Component Value Date    PREALBUMIN 7 (L) 10/27/2018       Estimated Creatinine Clearance: 31.5 mL/min (A) (based on SCr of 2.8 mg/dL (H)).    Accu-Checks  Recent Labs     18  1148 18  1716 18  2059 18  0744 18  1148 18  1650 18  0736 18  1155 18  1718 18  2116   POCTGLUCOSE 213* 171* 152* 156* 208* 137* 138* 148* 167* 109       Current Medications and/or Treatments Impacting Glycemic Control  Immunotherapy:    Immunosuppressants         Stop Route Frequency     tacrolimus capsule 0.5 mg      -- Oral 2 times daily        Steroids:   Hormones (From admission, onward)    Start     Stop Route Frequency Ordered    18 0900  predniSONE tablet 2.5 mg       08 Oral Daily 18 1115    12/10/18 0900  predniSONE tablet 5 mg       0859 Oral " Daily 11/26/18 1115    12/03/18 0900  predniSONE tablet 10 mg      12/10 0859 Oral Daily 11/26/18 1115    11/09/18 1345  methylPREDNISolone sodium succinate injection 500 mg  (Med - Immunosuppression Induction Therapy (Methylprednisolone))      11/10 0144 IV Once pre-op 11/09/18 1236        Pressors:    Autonomic Drugs (From admission, onward)    Start     Stop Route Frequency Ordered    11/30/18 1058  midodrine tablet 10 mg     Question:  Is the patient competent?  Answer:  Yes    -- Oral 3 times daily PRN 11/30/18 0958        Hyperglycemia/Diabetes Medications:   Antihyperglycemics (From admission, onward)    Start     Stop Route Frequency Ordered    12/04/18 1130  insulin aspart U-100 pen 3 Units      -- SubQ 3 times daily with meals 12/04/18 1059    11/27/18 1122  insulin aspart U-100 pen 0-5 Units      -- SubQ Before meals & nightly PRN 11/27/18 1023
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
## 2018-12-07 NOTE — PROGRESS NOTES
[facility]  Nephrology  Progress Note    Patient Name: Femi Enciso  MRN: [id number]  Admission Date: 10/27/2018  Hospital Length of Stay: 41 days  Attending Provider: Femi Patel MD   Primary Care Physician: Primary Doctor No  Principal Problem:S/P liver transplant    Subjective:     HPI: 52 y/o man with DM2 presents to the ED with family for liver failure (likely due to EtOH abundant history of drinking - diagnosed in Sept 2018 in [place]).  He reports jaundice, generalized weakness, nausea, diarrhea, and decreased appetite since Sept 2018.  He is from [place], [place], and Dr. Sharma (patients physician) recommended bringing him to hospital for evaluation.  Patient denies any fever, chills, vomiting, chest pain, palpitations, SOB, abdominal pain.      Nephrology consulted for evaluation/management Adri.     Interval History: Patient progressing well today, denies any issues. Patient evaluated while undergoing hemodialysis indicated for ESRD. Tolerating session with current UFR well, no complications.  Target UF 2-2.5 L as tolerated by patient.     Review of patient's allergies indicates:   Allergen Reactions    Penicillins Nausea And Vomiting and Rash     Full body rash from cefepime as well     Current Facility-Administered Medications   Medication Frequency    0.9%  NaCl infusion Once    albumin human 25% bottle 25 g Q8H    albuterol-ipratropium 2.5 mg-0.5 mg/3 mL nebulizer solution 3 mL Q4H PRN    albuterol-ipratropium 2.5 mg-0.5 mg/3 mL nebulizer solution 3 mL Q4H WAKE    bisacodyl suppository 10 mg Daily PRN    ceFEPIme injection 1 g Q24H    dextrose 50% injection 12.5 g PRN    dextrose 50% injection 25 g PRN    ergocalciferol capsule 50,000 Units Q7 Days    famotidine tablet 20 mg QHS    glucagon (human recombinant) injection 1 mg PRN    glucose chewable tablet 16 g PRN    glucose chewable tablet 24 g PRN    heparin (porcine) injection 1,000 Units PRN    heparin (porcine)  injection 5,000 Units Q8H    insulin aspart U-100 pen 0-5 Units QID (AC + HS) PRN    insulin aspart U-100 pen 2 Units TIDWM    isavuconazonium sulfate Cap 372 mg Daily    lidocaine HCL 10 mg/ml (1%) injection 1 mL Once    linezolid tablet 600 mg Q12H    midodrine tablet 10 mg TID PRN    nystatin 100,000 unit/mL suspension 500,000 Units QID (WM & HS)    ondansetron injection 4 mg Q6H PRN    predniSONE tablet 10 mg Daily    Followed by    [START ON 12/10/2018] predniSONE tablet 5 mg Daily    Followed by    [START ON 12/17/2018] predniSONE tablet 2.5 mg Daily    sodium bicarbonate tablet 650 mg BID    sulfamethoxazole-trimethoprim 400-80mg per tablet 1 tablet Every Mon, Wed, Fri    tacrolimus capsule 2 mg BID    tiZANidine tablet 2 mg Q8H PRN    trazodone split tablet 25 mg Nightly PRN    trazodone split tablet 25 mg QHS    ursodiol capsule 300 mg BID    valganciclovir 50 mg/ml oral solution 100 mg Once per day on Mon Wed Fri       Objective:     Vital Signs (Most Recent):  Temp: 98.3 °F (36.8 °C) (12/07/18 1320)  Pulse: 92 (12/07/18 1214)  Resp: 16 (12/07/18 1214)  BP: (!) 142/93 (12/07/18 1340)  SpO2: 97 % (12/07/18 1214)  O2 Device (Oxygen Therapy): room air (12/07/18 1214) Vital Signs (24h Range):  Temp:  [98.2 °F (36.8 °C)-99 °F (37.2 °C)] 98.3 °F (36.8 °C)  Pulse:  [] 92  Resp:  [16-34] 16  SpO2:  [95 %-99 %] 97 %  BP: (115-148)/(76-93) 142/93     Weight: 78.1 kg (172 lb 2.9 oz) (12/07/18 0410)  Body mass index is 24.71 kg/m².  Body surface area is 1.96 meters squared.    I/O last 3 completed shifts:  In: 1480 [P.O.:1480]  Out: 150 [Urine:150]    Physical Exam   Constitutional: He is oriented to person, place, and time. He appears well-developed. He is sleeping. He is easily aroused. No distress.   HENT:   Head: Normocephalic and atraumatic.   Neck: Normal range of motion. No JVD present.   Cardiovascular: Normal rate and regular rhythm. Exam reveals no friction rub.   Pulmonary/Chest:  Effort normal and breath sounds normal.   Abdominal: Soft. He exhibits no distension. There is no tenderness.   Musculoskeletal: He exhibits no edema.   Neurological: He is alert, oriented to person, place, and time and easily aroused.   More alert this AM, engaging in conversation   Skin: Skin is warm. He is not diaphoretic.       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   - s/p OHLTx 11/11/2018; intra-op SLED  - remaining oliguric   -hemodynamically stable, off pressors    Plan  -Will provide dialysis today for metabolic clearance and volume management.   -Target UF 2-2.5 L as tolerated by patient.   -Improved mental status this AM, patient engaging in coversation.  -Closely follow strict Is & Os and serial RFPs  -Avoid NSAIDs, contrast, nephrotoxins   -Daily weights  -Renally dose medications to current GFR  -Will discuss with staff         Case discussed with staff further recs with attestation.     I will follow-up with patient. Please contact us if you have any additional questions.    TAMELA Rousseau DNP, APRN, FNP-C  Department of Nephrology  [facility]  Pager: [phone]      ATTENDING PHYSICIAN ATTESTATION  I have personally interviewed and examined the patient. I thoroughly reviewed the demographic, clinical, laboratorial and imaging information available in  medical records. I agree with the assessment and recommendations provided by the ABAD Rousseau who was under my supervision.

## 2018-12-07 NOTE — SUBJECTIVE & OBJECTIVE
"Interval History:   No distress this AM.  Mother reports he didn't sleep well last night, had hallucinations of a cloud and a rabbit on the floor.  Pt does not recall these events.      Received trazodone 25 last night.    Family History     None        Tobacco Use    Smoking status: Never Smoker   Substance and Sexual Activity    Alcohol use: Not on file    Drug use: Not on file    Sexual activity: Not on file     Psychotherapeutics (From admission, onward)    Start     Stop Route Frequency Ordered    12/06/18 2100  trazodone split tablet 25 mg      -- Oral Nightly 12/06/18 1009    11/30/18 1233  trazodone split tablet 25 mg      -- Oral Nightly PRN 11/30/18 1150    11/14/18 1118  haloperidol lactate (HALDOL) 5 mg/mL injection     Comments:  Created by cabinet override    11/14 9797   11/14/18 111           Review of Systems   Constitutional: Negative for fever.   HENT: Negative for hearing loss.    Eyes: Negative for visual disturbance.   Respiratory: Negative for shortness of breath.    Cardiovascular: Negative for chest pain.   Gastrointestinal: Positive for abdominal pain.   Neurological: Negative for weakness.   Psychiatric/Behavioral: Positive for confusion, decreased concentration and sleep disturbance. Negative for hallucinations.     Objective:     Vital Signs (Most Recent):  Temp: 98.3 °F (36.8 °C) (12/07/18 1320)  Pulse: 92 (12/07/18 1214)  Resp: 16 (12/07/18 1214)  BP: (!) 140/87 (12/07/18 1500)  SpO2: 97 % (12/07/18 1214) Vital Signs (24h Range):  Temp:  [98.2 °F (36.8 °C)-99 °F (37.2 °C)] 98.3 °F (36.8 °C)  Pulse:  [] 92  Resp:  [16-34] 16  SpO2:  [95 %-99 %] 97 %  BP: (115-148)/(76-94) 140/87     Height: 5' 10" (177.8 cm)  Weight: 78.1 kg (172 lb 2.9 oz)  Body mass index is 24.71 kg/m².      Intake/Output Summary (Last 24 hours) at 12/7/2018 1554  Last data filed at 12/7/2018 0500  Gross per 24 hour   Intake 1130 ml   Output 150 ml   Net 980 ml       Physical Exam   Constitutional: He " "appears well-developed. No distress.   HENT:   Head: Normocephalic.   Cardiovascular: Normal rate and regular rhythm.   Pulmonary/Chest: Effort normal.   Abdominal:   abd incision w/ staples in place.  Clean/dry/intact.   Musculoskeletal: He exhibits no edema.             CAM-ICU:  1. Acute change and/or fluctuating course of mental status: No  2. Inattention (SAVEAHAART): No  ? "Squeeze my hand, only when you hear, the letter 'A'."  3. Altered Level of Consciousness: No  4. Disorganized Thinking (Errors >1/6):  No  ? "Will a stone float on water?"  ? "Are there fish in the sea?"  ? "Does one pound weigh more than two?"  ? "Can you use a hammer to pound a nail?"  ? Command(s):  § "Hold up 2 fingers."  § "Now do the same thing with the other hand."       Mental Status Exam:  Appearance: age appropriate, lying in bed, diffuse jaundice  Grooming: appropriate to situation  Arousal: awake.  Behavior/Cooperation: cooperative, psychomotor retardation  Speech: slight slowing/delay.  Language: appropriate english vocabulary  Mood: neutral  Affect: normal  Thought Process: normal  Thought Content: appropriate  Associations: no loose associations noted  Orientation: person, place, month/year.  Not oriented to day.  Memory: Limited.  Registration 3/3.  Recall 0/3 at 5 minutes.  Fund of Knowledge: Decreased  Attention Span/Concentration:  Unable to complete serial 7s.  Cognition: similarities were concrete  Insight: fair  Judgment: fair     Significant Labs: All pertinent labs within the past 24 hours have been reviewed.       Significant Imaging: I have reviewed all pertinent imaging results/findings within the past 24 hours.        "

## 2018-12-07 NOTE — PT/OT/SLP PROGRESS
Physical Therapy Treatment    Patient Name:  Femi Enciso   MRN:  [id number]    Recommendations:     Discharge Recommendations:  nursing facility, skilled   Discharge Equipment Recommendations: (TBD)   Barriers to discharge: Decreased caregiver support (at current functional level)    Assessment:     Femi Enciso is a 53 y.o. male admitted with a medical diagnosis of S/P liver transplant.  He presents with the following impairments/functional limitations:  weakness, gait instability, impaired endurance, impaired balance, impaired self care skills, impaired functional mobilty, decreased coordination, decreased safety awareness, impaired cardiopulmonary response to activity. Pt continues to require increased assist to complete functional mobility and maintain static standing. Noted to have improved motivation and improved ability to maintain standing with assist from his wife for UE support. Pt would continue to benefit from skilled acute PT in order to address current deficits and progress functional mobility.     Rehab Prognosis:  good; patient would benefit from acute skilled PT services to address these deficits and reach maximum level of function.      Recent Surgery: Procedure(s) (LRB):  LAPAROTOMY, EXPLORATORY, AFTER LIVER TRANSPLANT, LIKELY  ABDOMINAL CLOSURE (N/A) 19 Days Post-Op    Plan:     During this hospitalization, patient to be seen 4 x/week to address the above listed problems via gait training, therapeutic activities, therapeutic exercises, neuromuscular re-education  · Plan of Care Expires:  12/19/18   Plan of Care Reviewed with: patient, mother, spouse    Subjective     Communicated with RN prior to session.  Patient found supine upon PT entry to room, agreeable to treatment.      Chief Complaint: fatigue   Pain/Comfort:  · Pain Rating 1: 0/10    Patients cultural, spiritual, Sabianism conflicts given the current situation: none noted     Objective:     Patient found with: telemetry, pulse ox  (continuous)     General Precautions: Standard, fall   Orthopedic Precautions:N/A   Braces: N/A     Functional Mobility:  · Bed Mobility:     · Seated scooting: CGA   · Rolling Right: contact guard assistance and with side rail  · Supine to Sit: moderate assistance and with side rail and HOB elevated (managing trunk)  · Max cues throughout for technique and sequencing   · Transfers:     · Sit to Stand: x1 rep from EOB with modAx2; x1 rep from EOB with maxAx2; x3 reps from bedside chair with maxAx2  § Max v/c and t/c for proper hand and foot placement, use of forward momentum, and increased hip and knee ext  § B knees blocked to prevent buckling and facilitate knee ext  § Decreased ability to achieve full stand with full knee and hip ext, but improved ability to achieve full upright with assist from his wife to facilitate thoracic ext and provide UE support   · Bed to Chair: maximal assistance and of 2 persons with  no AD  using  Stand Pivot  § B knees blocked throughout with buckling noted   § Decreased ability to weight-shift appropriately or clear B feet from floor to advance  · Balance:   · static sitting: SBA-supervision  · Dynamic sitting: SBA-CGA  · Static standing: maxAx2      AM-PAC 6 CLICK MOBILITY  Turning over in bed (including adjusting bedclothes, sheets and blankets)?: 3  Sitting down on and standing up from a chair with arms (e.g., wheelchair, bedside commode, etc.): 2  Moving from lying on back to sitting on the side of the bed?: 2  Moving to and from a bed to a chair (including a wheelchair)?: 2  Need to walk in hospital room?: 1  Climbing 3-5 steps with a railing?: 1  Basic Mobility Total Score: 11       Therapeutic Activities and Exercises:  Pt educated on proper transfer technique and standing postural control to facilitate improved upright posture and standing balance.   Performed functional mobility as described above. Required extended seated rest breaks for recovery  Static standing x4 trials  with maxAx2, x~10-20 sec. Max v/c and t/c for upright posture, increased knee and hip ext; B knees blocked throughout; wife to assist with increased thoracic ext and provide UE support x2 trials   Encouragement provided throughout session from therapist, wife, and mother for continued participation in therapy. Education provided on the importance of OOB activity and participation in therapy.  Self-propelling bedside chair with BLE x~2 ft forward/backward with Julito      Patient left up in chair with all lines intact, call button in reach, RN notified and pt's wife and mother present..    GOALS:   Multidisciplinary Problems     Physical Therapy Goals        Problem: Physical Therapy Goal    Goal Priority Disciplines Outcome Goal Variances Interventions   Physical Therapy Goal     PT, PT/OT Ongoing (interventions implemented as appropriate)     Description:  Goals to be met by: 2018     Patient will increase functional independence with mobility by performin. Supine to sit with Modified [place] -not met  2. Sit to stand transfer with [place] -not met  3. Bed to chair transfer with independence using no AD -not met  4. Gait  x150 feet with Supervision using LRAD. -not met  5. Lower extremity exercise program x20 reps per handout, with independence -not met                Problem: Physical Therapy Goal    Goal Priority Disciplines Outcome Goal Variances Interventions   Physical Therapy Goal     PT, PT/OT                      Time Tracking:     PT Received On: 18  PT Start Time: 923     PT Stop Time: 956  PT Total Time (min): 33 min     Billable Minutes: Neuromuscular Re-education 33   (co-tx with OT)     Treatment Type: Treatment  PT/PTA: PT     PTA Visit Number: 0     Ramandeep Kumar, PT, DPT   2018  [phone]
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
## 2018-12-08 LAB
ALBUMIN SERPL BCP-MCNC: 2.4 G/DL
ALP SERPL-CCNC: 178 U/L
ALT SERPL W/O P-5'-P-CCNC: 17 U/L
ANION GAP SERPL CALC-SCNC: 9 MMOL/L
AST SERPL-CCNC: 15 U/L
BASOPHILS # BLD AUTO: 0.17 K/UL
BASOPHILS NFR BLD: 1.9 %
BILIRUB SERPL-MCNC: 2.4 MG/DL
BUN SERPL-MCNC: 18 MG/DL
CALCIUM SERPL-MCNC: 8.1 MG/DL
CHLORIDE SERPL-SCNC: 109 MMOL/L
CO2 SERPL-SCNC: 23 MMOL/L
CREAT SERPL-MCNC: 1.8 MG/DL
DIFFERENTIAL METHOD: ABNORMAL
EOSINOPHIL # BLD AUTO: 0.1 K/UL
EOSINOPHIL NFR BLD: 0.7 %
ERYTHROCYTE [DISTWIDTH] IN BLOOD BY AUTOMATED COUNT: 16.5 %
EST. GFR  (AFRICAN AMERICAN): 48.6 ML/MIN/1.73 M^2
EST. GFR  (NON AFRICAN AMERICAN): 42 ML/MIN/1.73 M^2
GLUCOSE SERPL-MCNC: 116 MG/DL
HCT VFR BLD AUTO: 25.2 %
HGB BLD-MCNC: 8 G/DL
IMM GRANULOCYTES # BLD AUTO: 0.05 K/UL
IMM GRANULOCYTES NFR BLD AUTO: 0.6 %
INR PPP: 1.2
LYMPHOCYTES # BLD AUTO: 0.6 K/UL
LYMPHOCYTES NFR BLD: 6.7 %
MAGNESIUM SERPL-MCNC: 1.6 MG/DL
MCH RBC QN AUTO: 30.3 PG
MCHC RBC AUTO-ENTMCNC: 31.7 G/DL
MCV RBC AUTO: 96 FL
MONOCYTES # BLD AUTO: 0.6 K/UL
MONOCYTES NFR BLD: 6.8 %
NEUTROPHILS # BLD AUTO: 7.4 K/UL
NEUTROPHILS NFR BLD: 83.3 %
NRBC BLD-RTO: 0 /100 WBC
PHOSPHATE SERPL-MCNC: 2.1 MG/DL
PLATELET # BLD AUTO: 156 K/UL
PMV BLD AUTO: 11.7 FL
POCT GLUCOSE: 104 MG/DL (ref 70–110)
POCT GLUCOSE: 116 MG/DL (ref 70–110)
POCT GLUCOSE: 149 MG/DL (ref 70–110)
POCT GLUCOSE: 196 MG/DL (ref 70–110)
POTASSIUM SERPL-SCNC: 3.6 MMOL/L
PROT SERPL-MCNC: 5 G/DL
PROTHROMBIN TIME: 12.6 SEC
RBC # BLD AUTO: 2.64 M/UL
SODIUM SERPL-SCNC: 141 MMOL/L
TACROLIMUS BLD-MCNC: 3.5 NG/ML
WBC # BLD AUTO: 8.83 K/UL

## 2018-12-08 PROCEDURE — 25000003 PHARM REV CODE 250: Performed by: STUDENT IN AN ORGANIZED HEALTH CARE EDUCATION/TRAINING PROGRAM

## 2018-12-08 PROCEDURE — 99233 SBSQ HOSP IP/OBS HIGH 50: CPT | Mod: 24,,, | Performed by: NURSE PRACTITIONER

## 2018-12-08 PROCEDURE — 99233 PR SUBSEQUENT HOSPITAL CARE,LEVL III: ICD-10-PCS | Mod: 24,,, | Performed by: NURSE PRACTITIONER

## 2018-12-08 PROCEDURE — 20600001 HC STEP DOWN PRIVATE ROOM

## 2018-12-08 PROCEDURE — 63600175 PHARM REV CODE 636 W HCPCS: Performed by: NURSE PRACTITIONER

## 2018-12-08 PROCEDURE — 83735 ASSAY OF MAGNESIUM: CPT

## 2018-12-08 PROCEDURE — 80053 COMPREHEN METABOLIC PANEL: CPT

## 2018-12-08 PROCEDURE — 94761 N-INVAS EAR/PLS OXIMETRY MLT: CPT

## 2018-12-08 PROCEDURE — 99232 SBSQ HOSP IP/OBS MODERATE 35: CPT | Mod: ,,, | Performed by: NURSE PRACTITIONER

## 2018-12-08 PROCEDURE — 25000242 PHARM REV CODE 250 ALT 637 W/ HCPCS: Performed by: NURSE PRACTITIONER

## 2018-12-08 PROCEDURE — 80197 ASSAY OF TACROLIMUS: CPT

## 2018-12-08 PROCEDURE — 85610 PROTHROMBIN TIME: CPT

## 2018-12-08 PROCEDURE — 25000003 PHARM REV CODE 250: Performed by: NURSE PRACTITIONER

## 2018-12-08 PROCEDURE — 63600175 PHARM REV CODE 636 W HCPCS: Performed by: STUDENT IN AN ORGANIZED HEALTH CARE EDUCATION/TRAINING PROGRAM

## 2018-12-08 PROCEDURE — 85025 COMPLETE CBC W/AUTO DIFF WBC: CPT

## 2018-12-08 PROCEDURE — 94640 AIRWAY INHALATION TREATMENT: CPT

## 2018-12-08 PROCEDURE — 84100 ASSAY OF PHOSPHORUS: CPT

## 2018-12-08 PROCEDURE — 99900035 HC TECH TIME PER 15 MIN (STAT)

## 2018-12-08 PROCEDURE — 99232 PR SUBSEQUENT HOSPITAL CARE,LEVL II: ICD-10-PCS | Mod: ,,, | Performed by: NURSE PRACTITIONER

## 2018-12-08 PROCEDURE — 94664 DEMO&/EVAL PT USE INHALER: CPT

## 2018-12-08 RX ORDER — TACROLIMUS 1 MG/1
3 CAPSULE ORAL 2 TIMES DAILY
Status: DISCONTINUED | OUTPATIENT
Start: 2018-12-08 | End: 2018-12-10

## 2018-12-08 RX ADMIN — URSODIOL 300 MG: 300 CAPSULE ORAL at 09:12

## 2018-12-08 RX ADMIN — NYSTATIN 500000 UNITS: 500000 SUSPENSION ORAL at 02:12

## 2018-12-08 RX ADMIN — SODIUM BICARBONATE 650 MG TABLET 650 MG: at 05:12

## 2018-12-08 RX ADMIN — IPRATROPIUM BROMIDE AND ALBUTEROL SULFATE 3 ML: .5; 3 SOLUTION RESPIRATORY (INHALATION) at 09:12

## 2018-12-08 RX ADMIN — NYSTATIN 500000 UNITS: 500000 SUSPENSION ORAL at 09:12

## 2018-12-08 RX ADMIN — TRAZODONE HYDROCHLORIDE 25 MG: 50 TABLET ORAL at 09:12

## 2018-12-08 RX ADMIN — FAMOTIDINE 20 MG: 20 TABLET ORAL at 09:12

## 2018-12-08 RX ADMIN — PREDNISONE 10 MG: 5 TABLET ORAL at 09:12

## 2018-12-08 RX ADMIN — LINEZOLID 600 MG: 600 TABLET, FILM COATED ORAL at 09:12

## 2018-12-08 RX ADMIN — NYSTATIN 500000 UNITS: 500000 SUSPENSION ORAL at 05:12

## 2018-12-08 RX ADMIN — TACROLIMUS 3 MG: 1 CAPSULE ORAL at 05:12

## 2018-12-08 RX ADMIN — SODIUM BICARBONATE 650 MG TABLET 650 MG: at 09:12

## 2018-12-08 RX ADMIN — ONDANSETRON 4 MG: 2 INJECTION INTRAMUSCULAR; INTRAVENOUS at 09:12

## 2018-12-08 RX ADMIN — TIZANIDINE 2 MG: 2 TABLET ORAL at 09:12

## 2018-12-08 RX ADMIN — IPRATROPIUM BROMIDE AND ALBUTEROL SULFATE 3 ML: .5; 3 SOLUTION RESPIRATORY (INHALATION) at 02:12

## 2018-12-08 RX ADMIN — TACROLIMUS 2 MG: 1 CAPSULE ORAL at 08:12

## 2018-12-08 RX ADMIN — HEPARIN SODIUM 5000 UNITS: 5000 INJECTION, SOLUTION INTRAVENOUS; SUBCUTANEOUS at 09:12

## 2018-12-08 RX ADMIN — HEPARIN SODIUM 5000 UNITS: 5000 INJECTION, SOLUTION INTRAVENOUS; SUBCUTANEOUS at 02:12

## 2018-12-08 RX ADMIN — IPRATROPIUM BROMIDE AND ALBUTEROL SULFATE 3 ML: .5; 3 SOLUTION RESPIRATORY (INHALATION) at 05:12

## 2018-12-08 RX ADMIN — ISAVUCONAZONIUM SULFATE 372 MG: 186 CAPSULE ORAL at 09:12

## 2018-12-08 RX ADMIN — HEPARIN SODIUM 5000 UNITS: 5000 INJECTION, SOLUTION INTRAVENOUS; SUBCUTANEOUS at 05:12

## 2018-12-08 NOTE — ASSESSMENT & PLAN NOTE
BG goal 140-180    Discontinue scheduled novolog.   Low dose correction scale given kidney function  BG monitoring AC/HS    Discharge plans- TBD

## 2018-12-08 NOTE — PROGRESS NOTES
Pt AAOx3-disoriented to place. Chevron incision raji with staples. Well approximated. Trazodone given for sleep. Zanaflex given x1 for muscle spasms. BG checks ac/hs. Night time . Mother at bedside attentive to pt needs. HD yesterday with 2.5L removed. No issues thus far this shift. Report given to Ambar MINOR RN.

## 2018-12-08 NOTE — PROGRESS NOTES
"[facility]  Endocrinology  Progress Note    Admit Date: 10/27/2018     Reason for Consult: Management of Hyperglycemia and type 2 DM    Surgical Procedure and Date: liver transplant 18; exploratory lap and liver biopsy on 18      Diabetes diagnosis year: ~ 3-4 years ago     Home Diabetes Medications:  none; previously on metformin 1000 mg BID    How often checking glucose at home? Not checking  BG readings on regimen: n/a  Hypoglycemia on the regimen?  n/a  Missed doses on regimen? n/a    Diabetes Complications include:     DORIAN    Complicating diabetes co morbidities:   CIRRHOSIS and Glucocorticoid use       HPI:   Patient is a 53 y.o. male with a diagnosis of ESLD secondary to ETOH abuse and DEL with ESRD with RRT. Patient is now s/p liver transplant. Endocrinology consulted for BG management.       Lab Results   Component Value Date    HGBA1C 4.4 2018             Interval HPI:   Overnight events: remains in TSU. Family at bedside. Tired today. BG below goal overnight and this AM.   Eatin%  Nausea: No  Hypoglycemia and intervention: No  Fever: No  TPN and/or TF: No    BP (!) 143/93   Pulse 94   Temp 99 °F (37.2 °C) (Oral)   Resp 17   Ht 5' 10" (1.778 m)   Wt 74 kg (163 lb 2.3 oz)   SpO2 99%   BMI 23.41 kg/m²      Labs Reviewed and Include    Recent Labs   Lab 18  0418   *   CALCIUM 8.1*   ALBUMIN 2.4*   PROT 5.0*      K 3.6   CO2 23      BUN 18   CREATININE 1.8*   ALKPHOS 178*   ALT 17   AST 15   BILITOT 2.4*     Lab Results   Component Value Date    WBC 8.83 2018    HGB 8.0 (L) 2018    HCT 25.2 (L) 2018    MCV 96 2018     2018     No results for input(s): TSH, FREET4 in the last 168 hours.  Lab Results   Component Value Date    HGBA1C 4.4 2018       Nutritional status:   Body mass index is 23.41 kg/m².  Lab Results   Component Value Date    ALBUMIN 2.4 (L) 2018    ALBUMIN 2.3 (L) 2018 "    ALBUMIN 1.6 (L) 12/06/2018     Lab Results   Component Value Date    PREALBUMIN 7 (L) 10/27/2018       Estimated Creatinine Clearance: 49 mL/min (A) (based on SCr of 1.8 mg/dL (H)).    Accu-Checks  Recent Labs     12/05/18  1148 12/05/18  1650 12/06/18  0736 12/06/18  1155 12/06/18  1718 12/06/18  2116 12/07/18  0747 12/07/18  1210 12/07/18  1615 12/08/18  0727   POCTGLUCOSE 208* 137* 138* 148* 167* 109 125* 182* 121* 104       Current Medications and/or Treatments Impacting Glycemic Control  Immunotherapy:    Immunosuppressants         Stop Route Frequency     tacrolimus capsule 2 mg      -- Oral 2 times daily        Steroids:   Hormones (From admission, onward)    Start     Stop Route Frequency Ordered    12/17/18 0900  predniSONE tablet 2.5 mg      12/24 0859 Oral Daily 11/26/18 1115    12/10/18 0900  predniSONE tablet 5 mg      12/17 0859 Oral Daily 11/26/18 1115    12/03/18 0900  predniSONE tablet 10 mg      12/10 0859 Oral Daily 11/26/18 1115    11/09/18 1345  methylPREDNISolone sodium succinate injection 500 mg  (Med - Immunosuppression Induction Therapy (Methylprednisolone))      11/10 0144 IV Once pre-op 11/09/18 1236        Pressors:    Autonomic Drugs (From admission, onward)    Start     Stop Route Frequency Ordered    11/30/18 1058  midodrine tablet 10 mg     Question:  Is the patient competent?  Answer:  Yes    -- Oral 3 times daily PRN 11/30/18 0958        Hyperglycemia/Diabetes Medications:   Antihyperglycemics (From admission, onward)    Start     Stop Route Frequency Ordered    11/27/18 1122  insulin aspart U-100 pen 0-5 Units      -- SubQ Before meals & nightly PRN 11/27/18 1023          ASSESSMENT and PLAN    * S/P liver transplant    Managed per LTS.   avoid hypoglycemia  Optimize BG control for surgical wound healing.     Lab Results   Component Value Date    ALT 17 12/08/2018    AST 15 12/08/2018     (H) 11/26/2018    ALKPHOS 178 (H) 12/08/2018    BILITOT 2.4 (H) 12/08/2018             Type 2 diabetes mellitus without complication    BG goal 140-180    Discontinue scheduled novolog.   Low dose correction scale given kidney function  BG monitoring AC/HS    Discharge plans- TBD     Adrenal cortical steroids causing adverse effect in therapeutic use    On standard steroid taper per transplant team; may elevate BG readings         DEL (acute kidney injury)    Avoid insulin stacking and hypoglycemia.  Nephrology following  Lab Results   Component Value Date    CREATININE 1.8 (H) 12/08/2018            Prophylactic immunotherapy    May increase insulin resistance.            Tessa Falk NP  Endocrinology  [facility]

## 2018-12-08 NOTE — SUBJECTIVE & OBJECTIVE
"Interval HPI:   Overnight events: remains in TSU. Family at bedside. Tired today. BG below goal overnight and this AM.   Eatin%  Nausea: No  Hypoglycemia and intervention: No  Fever: No  TPN and/or TF: No    BP (!) 143/93   Pulse 94   Temp 99 °F (37.2 °C) (Oral)   Resp 17   Ht 5' 10" (1.778 m)   Wt 74 kg (163 lb 2.3 oz)   SpO2 99%   BMI 23.41 kg/m²     Labs Reviewed and Include    Recent Labs   Lab 18  0418   *   CALCIUM 8.1*   ALBUMIN 2.4*   PROT 5.0*      K 3.6   CO2 23      BUN 18   CREATININE 1.8*   ALKPHOS 178*   ALT 17   AST 15   BILITOT 2.4*     Lab Results   Component Value Date    WBC 8.83 2018    HGB 8.0 (L) 2018    HCT 25.2 (L) 2018    MCV 96 2018     2018     No results for input(s): TSH, FREET4 in the last 168 hours.  Lab Results   Component Value Date    HGBA1C 4.4 2018       Nutritional status:   Body mass index is 23.41 kg/m².  Lab Results   Component Value Date    ALBUMIN 2.4 (L) 2018    ALBUMIN 2.3 (L) 2018    ALBUMIN 1.6 (L) 2018     Lab Results   Component Value Date    PREALBUMIN 7 (L) 10/27/2018       Estimated Creatinine Clearance: 49 mL/min (A) (based on SCr of 1.8 mg/dL (H)).    Accu-Checks  Recent Labs     18  1148 18  1650 18  0736 18  1155 18  1718 18  2116 18  0747 18  1210 18  1615 18  0727   POCTGLUCOSE 208* 137* 138* 148* 167* 109 125* 182* 121* 104       Current Medications and/or Treatments Impacting Glycemic Control  Immunotherapy:    Immunosuppressants         Stop Route Frequency     tacrolimus capsule 2 mg      -- Oral 2 times daily        Steroids:   Hormones (From admission, onward)    Start     Stop Route Frequency Ordered    18 0900  predniSONE tablet 2.5 mg      859 Oral Daily 18 1115    12/10/18 0900  predniSONE tablet 5 mg       08 Oral Daily 18 1115    18 0900  predniSONE " tablet 10 mg      12/10 0859 Oral Daily 11/26/18 1115    11/09/18 1345  methylPREDNISolone sodium succinate injection 500 mg  (Med - Immunosuppression Induction Therapy (Methylprednisolone))      11/10 0144 IV Once pre-op 11/09/18 1236        Pressors:    Autonomic Drugs (From admission, onward)    Start     Stop Route Frequency Ordered    11/30/18 1058  midodrine tablet 10 mg     Question:  Is the patient competent?  Answer:  Yes    -- Oral 3 times daily PRN 11/30/18 0958        Hyperglycemia/Diabetes Medications:   Antihyperglycemics (From admission, onward)    Start     Stop Route Frequency Ordered    11/27/18 1122  insulin aspart U-100 pen 0-5 Units      -- SubQ Before meals & nightly PRN 11/27/18 1023

## 2018-12-08 NOTE — PLAN OF CARE
"Problem: Patient Care Overview  Goal: Plan of Care Review  Dx: Liver transplant (11/11)  hx: ESLD, ETOH abuse; Severe depression.    11/12: liver transplant; extubated. Products given in OR and HD in OR. CRRT nocturnal.  11/13: CRRT nocturnal held; lines removed. FFP x1 given. Pulled out 2 JOSE A drains.   11/14: 3 PRBC's given for hgb 6; liver US shows potential hematoma. Trend CBC. Extreme agitation/attempted to "kill self" by holding breath for as long as possible multiple times. IV ativan/haldol given. Nocturnal CRRT restarted  Potential washout  To be determined.    11/16: OR for exlap and washout--1U PRBCs and 1 plts; sent back to OR--3 U PRBCs, 1FFP, 1cryo; abd open and wound vac placed  11/17: CT chest, abd, pelvis, levo off 2 units PRBCs, 2 units platelets, 1 unit FFP   11/18: OR for closure, extubated   11/19: clear liquid diet   11/20: TPN/Lipids d/c'd, renal diet, transfer orders  11/21: Head and chest CT  11/22: Pt to IR for drain placement  11/23: HD trial  11/25: HD 2 Liters removed  Nursing:   -160          Outcome: Ongoing (interventions implemented as appropriate)  3.5 HR HD completed, net fluid [id number] mls, target goal achieved. Pt tolerated well. Dialysis catheter ports locked with HEPARIN 1000units/ml to fill lumen volume, ends capped and secured.       "

## 2018-12-08 NOTE — ASSESSMENT & PLAN NOTE
Managed per LTS.   avoid hypoglycemia  Optimize BG control for surgical wound healing.     Lab Results   Component Value Date    ALT 17 12/08/2018    AST 15 12/08/2018     (H) 11/26/2018    ALKPHOS 178 (H) 12/08/2018    BILITOT 2.4 (H) 12/08/2018

## 2018-12-08 NOTE — ASSESSMENT & PLAN NOTE
- DEL for over 2 weeks prior to transplant. Nephrology was consulted.     - Post transplant, Nephrology cont to follow.  He received HD last on 11/30 for metabolic clearance and fluid management.     - Attempted Lasix challenge (160 mg) x1 on 11/28- pt diuresed 105 cc.    - HD 12/3- removed 1.5 liter.  Cont strict I/O's.  Monitor closely.  - Lasix challenge 12/4 with minimal UOP  - Last HD 12/5- 2.3L removed.    - Crackles to mell bases and dependent edema.  Lasix 100mg and albumin x3 12/6/18.  - HD last 12/7/18

## 2018-12-08 NOTE — SUBJECTIVE & OBJECTIVE
Scheduled Meds:   albuterol-ipratropium  3 mL Nebulization Q4H WAKE    ergocalciferol  50,000 Units Oral Q7 Days    famotidine  20 mg Oral QHS    heparin (porcine)  5,000 Units Subcutaneous Q8H    isavuconazonium sulfate  372 mg Oral Daily    lidocaine HCL 10 mg/ml (1%)  1 mL Intradermal Once    linezolid  600 mg Oral Q12H    nystatin  500,000 Units Oral QID (WM & HS)    predniSONE  10 mg Oral Daily    Followed by    [START ON 12/10/2018] predniSONE  5 mg Oral Daily    Followed by    [START ON 12/17/2018] predniSONE  2.5 mg Oral Daily    sodium bicarbonate  650 mg Oral BID    sulfamethoxazole-trimethoprim 400-80mg  1 tablet Oral Every Mon, Wed, Fri    tacrolimus  3 mg Oral BID    traZODone  25 mg Oral QHS    ursodiol  300 mg Oral BID    valganciclovir 50 mg/ml  100 mg Oral Once per day on Mon Wed Fri     Continuous Infusions:  PRN Meds:albuterol-ipratropium, bisacodyl, dextrose 50%, dextrose 50%, glucagon (human recombinant), glucose, glucose, heparin (porcine), insulin aspart U-100, midodrine, ondansetron, tiZANidine, traZODone    Review of Systems   Constitutional: Positive for activity change, appetite change (poor) and fatigue. Negative for fever.   HENT: Positive for voice change. Negative for facial swelling.    Eyes: Negative.    Respiratory: Positive for shortness of breath (w exertion). Negative for apnea, cough, choking, chest tightness and wheezing.    Cardiovascular: Negative.  Negative for chest pain, palpitations and leg swelling.   Gastrointestinal: Positive for abdominal distention. Negative for abdominal pain, constipation, diarrhea, nausea and vomiting.   Genitourinary: Positive for decreased urine volume. Negative for difficulty urinating, dysuria, hematuria and urgency.   Musculoskeletal: Negative.  Negative for back pain, gait problem, neck pain and neck stiffness.   Skin: Positive for wound. Negative for color change and pallor.   Allergic/Immunologic: Positive for  immunocompromised state.   Neurological: Positive for weakness. Negative for dizziness, seizures, light-headedness and headaches.   Psychiatric/Behavioral: Positive for decreased concentration and dysphoric mood. Negative for behavioral problems, confusion, hallucinations, sleep disturbance and suicidal ideas. The patient is not nervous/anxious.      Objective:     Vital Signs (Most Recent):  Temp: 98 °F (36.7 °C) (12/08/18 1142)  Pulse: 102 (12/08/18 1142)  Resp: 20 (12/08/18 1142)  BP: 137/89 (12/08/18 1142)  SpO2: 98 % (12/08/18 1142) Vital Signs (24h Range):  Temp:  [98 °F (36.7 °C)-99 °F (37.2 °C)] 98 °F (36.7 °C)  Pulse:  [] 102  Resp:  [17-29] 20  SpO2:  [97 %-99 %] 98 %  BP: (119-148)/(77-98) 137/89     Weight: 74 kg (163 lb 2.3 oz)  Body mass index is 23.41 kg/m².    Intake/Output - Last 3 Shifts       12/06 0700 - 12/07 0659 12/07 0700 - 12/08 0659 12/08 0700 - 12/09 0659    P.O. 1130 1020 120    I.V. (mL/kg)   0 (0)    Blood       Other  600 0    Total Intake(mL/kg) 1130 (14.5) 1620 (21.9) 120 (1.6)    Urine (mL/kg/hr) 150 (0.1) 75 (0) 0 (0)    Emesis/NG output 0 0     Other 0 3150     Stool 0 0 0    Blood 0 0     Total Output 150 3225 0    Net +980 -1605 +120           Urine Occurrence 0 x 0 x 0 x    Stool Occurrence 2 x 1 x 1 x    Emesis Occurrence 0 x 0 x           Physical Exam   Constitutional: He is oriented to person, place, and time. He appears well-developed. No distress.   HENT:   Head: Normocephalic and atraumatic.   White drainage noted to back of throat and roof on mouth, voice hoarse   Eyes: Pupils are equal, round, and reactive to light. Scleral icterus is present.   Neck: Normal range of motion. Neck supple. No JVD present.   Cardiovascular: Normal rate, regular rhythm and normal heart sounds.   No murmur heard.  Pulmonary/Chest: Effort normal. No respiratory distress. He has decreased breath sounds in the right lower field and the left lower field. He has no wheezes. He exhibits no  tenderness.   Taking short breaths, IS up to 500   Abdominal: Soft. Bowel sounds are normal. He exhibits no distension. There is tenderness.   Chevron inc ECTOR w/ staples  RLQ JOSE A drain and percutaneous drain site w suture CDI.    Musculoskeletal: Normal range of motion. He exhibits edema (2+ BLE). He exhibits no tenderness.   Neurological: He is alert and oriented to person, place, and time. He has normal reflexes.   Skin: Skin is warm and dry. Capillary refill takes 2 to 3 seconds. He is not diaphoretic. There is pallor.   Psychiatric: His affect is labile. He is slowed. Cognition and memory are impaired.   Nursing note and vitals reviewed.      Laboratory:  Immunosuppressants         Stop Route Frequency     tacrolimus capsule 3 mg      -- Oral 2 times daily        CBC:   Recent Labs   Lab 12/08/18 0418   WBC 8.83   RBC 2.64*   HGB 8.0*   HCT 25.2*      MCV 96   MCH 30.3   MCHC 31.7*     CMP:   Recent Labs   Lab 12/08/18 0418   *   CALCIUM 8.1*   ALBUMIN 2.4*   PROT 5.0*      K 3.6   CO2 23      BUN 18   CREATININE 1.8*   ALKPHOS 178*   ALT 17   AST 15   BILITOT 2.4*     Coagulation:   Recent Labs   Lab 12/08/18 0418   INR 1.2     Tacrolimus Lvl   Date Value Ref Range Status   12/08/2018 3.5 (L) 5.0 - 15.0 ng/mL Final     Comment:     Testing performed by Liquid Chromatography-Tandem  Mass Spectrometry (LC-MS/MS).  This test was developed and its performance characteristics  determined by [facility], Department of Pathology  and Laboratory Medicine in a manner consistent with CLIA  requirements. It has not been cleared or approved by the [place]  Food and Drug Administration.  This test is used for clinical   purposes.  It should not be regarded as investigational or for  research.     12/07/2018 1.9 (L) 5.0 - 15.0 ng/mL Final     Comment:     Testing performed by Liquid Chromatography-Tandem  Mass Spectrometry (LC-MS/MS).  This test was developed and its performance  characteristics  determined by [facility], Department of Pathology  and Laboratory Medicine in a manner consistent with CLIA  requirements. It has not been cleared or approved by the [place]  Food and Drug Administration.  This test is used for clinical   purposes.  It should not be regarded as investigational or for  research.     12/06/2018 3.3 (L) 5.0 - 15.0 ng/mL Final     Comment:     Testing performed by Liquid Chromatography-Tandem  Mass Spectrometry (LC-MS/MS).  This test was developed and its performance characteristics  determined by [facility], Department of Pathology  and Laboratory Medicine in a manner consistent with CLIA  requirements. It has not been cleared or approved by the [place]  Food and Drug Administration.  This test is used for clinical   purposes.  It should not be regarded as investigational or for  research.     12/05/2018 6.7 5.0 - 15.0 ng/mL Final     Comment:     Testing performed by Liquid Chromatography-Tandem  Mass Spectrometry (LC-MS/MS).  This test was developed and its performance characteristics  determined by [facility], Department of Pathology  and Laboratory Medicine in a manner consistent with CLIA  requirements. It has not been cleared or approved by the [place]  Food and Drug Administration.  This test is used for clinical   purposes.  It should not be regarded as investigational or for  research.           Labs within the past 24 hours have been reviewed.    Diagnostic Results:  None

## 2018-12-08 NOTE — PROGRESS NOTES
[facility]  Liver Transplant  Progress Note    Patient Name: Femi Enciso  MRN: [id number]  Admission Date: 10/27/2018  Hospital Length of Stay: 42 days  Code Status: Full Code  Primary Care Provider: Primary Doctor No  Post-Operative Day: 27    ORGAN:   LIVER  Disease Etiology: Alcoholic Cirrhosis  Donor Type:    - Brain Death  CDC High Risk:   No  Donor CMV Status:   Donor CMV Status: Positive  Donor HBcAB:   Negative  Donor HCV Status:   Negative  Donor HBV JAVIER: Negative  Donor HCV JAVIER: Negative  Whole or Partial: Whole Liver  Biliary Anastomosis: End to End  Arterial Anatomy: Standard  Subjective:     History of Present Illness:  Femi Enciso is a 53yr old male with PMH of acute ETOH hepatitis and DEL/ATN evolved to ESRD requiring HD (not candidate for KTX). He is now s/p liver transplant (SM induction, DBD, CMV D+/R-). Transplant surgery noted severe collateralization, adrenal gland firmly adhered to liver. Bare area of adrenal gland required several stitches and packing to obtain fair hemostasis (EBL 15L). OR take back  for bleeding from R adrenal bed in AM (significant clot in retroperitoneum, posterior to R hepatic lobe, tract posterior to the R kidney containing significant clot, small bleeding area of retroperitoneal fat) then returned to surgery same day for hemorrhaging closed with wound vac with plans to return to OR 24-48 hours for closure (retroperitoneal /retrorenal/retrocolic hematoma on the right ). Raw retroperitoneal bleeding. Suture ligatures placed, argon beam cautery, evarest placed in retroperitoneum behind cava and right kidney. Significant oozing from upper right adrenal gland, not amenable to ligation, that portion of the adrenal gland was resected with cautery). OR take back  for washout and incisional closure, no significant bleeding or hematoma found. OR cultures   from body fluid grew enterococcus faecium VRE. ID was consulted,  started on  daptomycin for VRE treatment and cefepime/flagyl to cover for IA ty in bile. Patient with significant leukocytosis with peak on  at 48K prompting drainage of R subphrenic fluid collection, perc drain placed, culture grew VRE. Stroke code called  for lethargy and unresponsive, CT head without evidence of acute ischemic changes and CTA chest negative, vascular neurology was consulted recommended MRI brain, but patient's AMS improved shortly after event. Nephrology consulted for ESRD requiring HD. Patient overall improved on antibiotic regimen, leukocytosis and AMS improved. He was transferred to T[facility] on POD #15.     Hospital Course:  Interval History:  No acute events overnight. Mental status improving daily. Family agrees mental status better.  LFTs at this time wnl.  T bili remains 2.4.  T bili going into transplant 37.  Per family, he is sleeping well on Trazodone on  dose to 25 mg.  Psych was consulted and apprec recs.  Pt taking Zanaflex as needed w good mgt of back pain.  Cr remains elevated.  HD today.  Pitting edema to BLE slightly better.  Last transfused 1 u PRBC on  w appropriate response.  WBC has normalized.  Blood cx /4 NGTD, urine cx 12/4 NGTD.  Cefepime x 1 week (end date 18).  Cont Linezolid for VRE thru 18.    Pt remains afebrile.  Monitor.         Scheduled Meds:   albuterol-ipratropium  3 mL Nebulization Q4H WAKE    ergocalciferol  50,000 Units Oral Q7 Days    famotidine  20 mg Oral QHS    heparin (porcine)  5,000 Units Subcutaneous Q8H    isavuconazonium sulfate  372 mg Oral Daily    lidocaine HCL 10 mg/ml (1%)  1 mL Intradermal Once    linezolid  600 mg Oral Q12H    nystatin  500,000 Units Oral QID (WM & HS)    predniSONE  10 mg Oral Daily    Followed by    [START ON 12/10/2018] predniSONE  5 mg Oral Daily    Followed by    [START ON 2018] predniSONE  2.5 mg Oral Daily    sodium bicarbonate  650 mg Oral BID    sulfamethoxazole-trimethoprim  400-80mg  1 tablet Oral Every Mon, Wed, Fri    tacrolimus  3 mg Oral BID    traZODone  25 mg Oral QHS    ursodiol  300 mg Oral BID    valganciclovir 50 mg/ml  100 mg Oral Once per day on Mon Wed Fri     Continuous Infusions:  PRN Meds:albuterol-ipratropium, bisacodyl, dextrose 50%, dextrose 50%, glucagon (human recombinant), glucose, glucose, heparin (porcine), insulin aspart U-100, midodrine, ondansetron, tiZANidine, traZODone    Review of Systems   Constitutional: Positive for activity change, appetite change (poor) and fatigue. Negative for fever.   HENT: Positive for voice change. Negative for facial swelling.    Eyes: Negative.    Respiratory: Positive for shortness of breath (w exertion). Negative for apnea, cough, choking, chest tightness and wheezing.    Cardiovascular: Negative.  Negative for chest pain, palpitations and leg swelling.   Gastrointestinal: Positive for abdominal distention. Negative for abdominal pain, constipation, diarrhea, nausea and vomiting.   Genitourinary: Positive for decreased urine volume. Negative for difficulty urinating, dysuria, hematuria and urgency.   Musculoskeletal: Negative.  Negative for back pain, gait problem, neck pain and neck stiffness.   Skin: Positive for wound. Negative for color change and pallor.   Allergic/Immunologic: Positive for immunocompromised state.   Neurological: Positive for weakness. Negative for dizziness, seizures, light-headedness and headaches.   Psychiatric/Behavioral: Positive for decreased concentration and dysphoric mood. Negative for behavioral problems, confusion, hallucinations, sleep disturbance and suicidal ideas. The patient is not nervous/anxious.      Objective:     Vital Signs (Most Recent):  Temp: 98 °F (36.7 °C) (12/08/18 1142)  Pulse: 102 (12/08/18 1142)  Resp: 20 (12/08/18 1142)  BP: 137/89 (12/08/18 1142)  SpO2: 98 % (12/08/18 1142) Vital Signs (24h Range):  Temp:  [98 °F (36.7 °C)-99 °F (37.2 °C)] 98 °F (36.7 °C)  Pulse:   [] 102  Resp:  [17-29] 20  SpO2:  [97 %-99 %] 98 %  BP: (119-148)/(77-98) 137/89     Weight: 74 kg (163 lb 2.3 oz)  Body mass index is 23.41 kg/m².    Intake/Output - Last 3 Shifts       12/06 0700 - 12/07 0659 12/07 0700 - 12/08 0659 12/08 0700 - 12/09 0659    P.O. 1130 1020 120    I.V. (mL/kg)   0 (0)    Blood       Other  600 0    Total Intake(mL/kg) 1130 (14.5) 1620 (21.9) 120 (1.6)    Urine (mL/kg/hr) 150 (0.1) 75 (0) 0 (0)    Emesis/NG output 0 0     Other 0 3150     Stool 0 0 0    Blood 0 0     Total Output 150 3225 0    Net +980 -1605 +120           Urine Occurrence 0 x 0 x 0 x    Stool Occurrence 2 x 1 x 1 x    Emesis Occurrence 0 x 0 x           Physical Exam   Constitutional: He is oriented to person, place, and time. He appears well-developed. No distress.   HENT:   Head: Normocephalic and atraumatic.   White drainage noted to back of throat and roof on mouth, voice hoarse   Eyes: Pupils are equal, round, and reactive to light. Scleral icterus is present.   Neck: Normal range of motion. Neck supple. No JVD present.   Cardiovascular: Normal rate, regular rhythm and normal heart sounds.   No murmur heard.  Pulmonary/Chest: Effort normal. No respiratory distress. He has decreased breath sounds in the right lower field and the left lower field. He has no wheezes. He exhibits no tenderness.   Taking short breaths, IS up to 500   Abdominal: Soft. Bowel sounds are normal. He exhibits no distension. There is tenderness.   Chevron inc ECTOR w/ staples  RLQ JOSE A drain and percutaneous drain site w suture CDI.    Musculoskeletal: Normal range of motion. He exhibits edema (2+ BLE). He exhibits no tenderness.   Neurological: He is alert and oriented to person, place, and time. He has normal reflexes.   Skin: Skin is warm and dry. Capillary refill takes 2 to 3 seconds. He is not diaphoretic. There is pallor.   Psychiatric: His affect is labile. He is slowed. Cognition and memory are impaired.   Nursing note and  vitals reviewed.      Laboratory:  Immunosuppressants         Stop Route Frequency     tacrolimus capsule 3 mg      -- Oral 2 times daily        CBC:   Recent Labs   Lab 12/08/18 0418   WBC 8.83   RBC 2.64*   HGB 8.0*   HCT 25.2*      MCV 96   MCH 30.3   MCHC 31.7*     CMP:   Recent Labs   Lab 12/08/18 0418   *   CALCIUM 8.1*   ALBUMIN 2.4*   PROT 5.0*      K 3.6   CO2 23      BUN 18   CREATININE 1.8*   ALKPHOS 178*   ALT 17   AST 15   BILITOT 2.4*     Coagulation:   Recent Labs   Lab 12/08/18 0418   INR 1.2     Tacrolimus Lvl   Date Value Ref Range Status   12/08/2018 3.5 (L) 5.0 - 15.0 ng/mL Final     Comment:     Testing performed by Liquid Chromatography-Tandem  Mass Spectrometry (LC-MS/MS).  This test was developed and its performance characteristics  determined by [facility], Department of Pathology  and Laboratory Medicine in a manner consistent with CLIA  requirements. It has not been cleared or approved by the [place]  Food and Drug Administration.  This test is used for clinical   purposes.  It should not be regarded as investigational or for  research.     12/07/2018 1.9 (L) 5.0 - 15.0 ng/mL Final     Comment:     Testing performed by Liquid Chromatography-Tandem  Mass Spectrometry (LC-MS/MS).  This test was developed and its performance characteristics  determined by [facility], Department of Pathology  and Laboratory Medicine in a manner consistent with CLIA  requirements. It has not been cleared or approved by the [place]  Food and Drug Administration.  This test is used for clinical   purposes.  It should not be regarded as investigational or for  research.     12/06/2018 3.3 (L) 5.0 - 15.0 ng/mL Final     Comment:     Testing performed by Liquid Chromatography-Tandem  Mass Spectrometry (LC-MS/MS).  This test was developed and its performance characteristics  determined by [facility], Department of Pathology  and Laboratory Medicine in a manner  consistent with CLIA  requirements. It has not been cleared or approved by the [place]  Food and Drug Administration.  This test is used for clinical   purposes.  It should not be regarded as investigational or for  research.     12/05/2018 6.7 5.0 - 15.0 ng/mL Final     Comment:     Testing performed by Liquid Chromatography-Tandem  Mass Spectrometry (LC-MS/MS).  This test was developed and its performance characteristics  determined by [facility], Department of Pathology  and Laboratory Medicine in a manner consistent with CLIA  requirements. It has not been cleared or approved by the [place]  Food and Drug Administration.  This test is used for clinical   purposes.  It should not be regarded as investigational or for  research.           Labs within the past 24 hours have been reviewed.    Diagnostic Results:  None    Assessment/Plan:     * S/P liver transplant    - 53 year old male with history of ESLD 2/2 ETOH cirrhosis who is s/p liver transplant c/b take-back x 3 for bleeding most recently 11/18.  - LFTs now improving slowly.  T bili was 37.6 day of transplant.  - Pt remains with significant debility. Pt will need SNF placement when medically stable.   - Appetite slowly improving.  Tolerating supplements.  Encourage PO intake.   - Drain placed during take back. Drain placed to intra-abdominal abscess on 11/22.   - Fluid from collection noted with enterococcus VRE. Cont with antibxs per ID.  De escalated to PO on 11/29/18.    - Abdominal pain well controlled, and having BMs.    - HD per nephrology.   - Muscle relaxer started and will hold off on oxycodone for now.   - LFTs remain stable.  T bili 2.4, was 37 prior to txp.         Delirium    - Pt with intermittent confusion since transplant was improving slowly.   - Pt with some lethargy and confusion on 11/21. Head CT negative.   - AAOx3. More alert and awake on 11/27.   - AAOx4 on 11/29.  He was happy he could remember what day it is today.    - confusion  worse over the wkd.  Seroquel d/c'd 11/30/18.  Trazodone for insomnia ordered w PRN dose.    - re consulted psych, awaiting recommendations.   - delirium could be d/t rising prograf.  AMS improved since holding Prograf.    - CT head 12/4 with no acute findings.   - Restarted Prograf 12/6- will need to monitor mental status closely.     Moderate malnutrition    - muscle wasting noted.  Appetite on and off.  Cont supplements.  Dietician following.  Apprec recs.         Acute renal failure with acute tubular necrosis superimposed on stage 3 chronic kidney disease    - Renal function slow to recover post-op.   - Nephrology consulted for management.   - Cont with HD as per nephrology recs.  Apprec recs.    - HD on 11/27 w/ 2L off. Pt tolerated well.    - Lasix 160 mg challenge 11/28 w/ minimal output.    - HD performed on 12/3 at bedside. 1.5 removed, tolerated.   - Strict I&Os.      Intra-abdominal abscess    - Significant increase in WBC post-op.   - OR cultures from 11/18 noted with enterococcus VRE.   - Abdominal CT performed at that time with fluid collection and drain placed on 11/22 for drainage.   - Intra-abdominal abscess culture also with enterococcus VRE.   - ID consulted and pt placed on antibxs for treatment. Pt was on Cefepime, Daptomycin, and Fluconazole for treatment.    - Pt remains with RLQ drains (one JOSE A and one Percutaneous).  One JOSE A removed 11/28.  Perc drain in placed draining tan, clear drainage.  WBC slowly improving.   - Liver US on 11/26 reviewed.   - Abdominal CT performed 11/27 and reviewed. Fluid collection noted with improvement on CT.  ID following and reassured by findings.    - Dapto, Cefepime and Flagyl changed 11/30/18 to Linezolid 600 mg PO q 12 hr x 10 days per ID.     - added back cefepime after thora.  ID re consulted.  -Plan to complete Zyvox x 10 days per ID thru 12/9/18. Monitor.      Long-term use of immunosuppressant medication    - Cont with prograf. Draw prograf level daily  and adjust dose as needed to maintain a therapeutic level.        At risk for opportunistic infections    - Cont with bactrim and valcyte for protection against opportunistic infections.   - cont Isavuconazonium.     Leucocytosis    - See intra-abdominal abscess.  - wbc improving, cont w delirium.    - Repeat cx 12/4 with NGTD.  Cont Linezolid and Cefepime per ID.         Prophylactic immunotherapy    - See long term immunosuppression.        Weakness    - Pt remains significantly debilitated.   - PT/OT for strengthening.   - Currently will need SNF for placement and further rehabilitation.  Insurance does not cover Rehab.       Pleural effusion associated with hepatic disorder    - c/o increased pain w deep breath today to right side.  CXR showed increased opacity in the inferior hemothorax on the right side since 11/26/2018.  Pulse ox 97-99% on RA.  Cont to monitor    - continues to have fluid to RLL.  WBC remains elevated.    - thoracentesis performed on 11/30. Fluid noted with 4k WBC, 93 SEGS. cx no growth to date.  Cefepime added for treatment.  - Chest CT showing right pleural effusion improved, left w increased pleural effusion.   - Per ID, will treat empyema w Cefepime thru 12/8.     Type 2 diabetes mellitus without complication    - Endocrine consulted for management. Appreciate recs.        Anemia of chronic disease    - H&H trending down. Transfused 1 unit of PRBC with HD- . Monitor with daily labs.   - Chest/Abd/pelvis CT 12/4- no fluid to be drainged per transplant surgeon.  No source of bleeding.     - last liver US 11/27. Evolving peritransplant hematomas with anechoic fluid collection adjacent to the right hepatic lobe.  Small volume perihepatic free fluid.     DEL (acute kidney injury)    - DEL for over 2 weeks prior to transplant. Nephrology was consulted.     - Post transplant, Nephrology cont to follow.  He received HD last on 11/30 for metabolic clearance and fluid management.     - Attempted  Lasix challenge (160 mg) x1 on 11/28- pt diuresed 105 cc.    - HD 12/3- removed 1.5 liter.  Cont strict I/O's.  Monitor closely.  - Lasix challenge 12/4 with minimal UOP  - Last HD 12/5- 2.3L removed.    - Crackles to mell bases and dependent edema.  Lasix 100mg and albumin x3 12/6/18.  - HD last 12/7/18     Acute renal failure    - DEL past 2 weeks.  Pt on Midodrine.  Last HD 11/9/18- 0 fluid removed 2/2 hypotension.  - Nephrology following for HD-  Pt cont to have hypotenstion worse w standing.  Midodrine 10 mg PRN for hypotension during HD ordered.           VTE Risk Mitigation (From admission, onward)        Ordered     heparin (porcine) injection 5,000 Units  Every 8 hours      11/30/18 1149     heparin (porcine) injection 1,000 Units  As needed (PRN)      11/25/18 1428     IP VTE HIGH RISK PATIENT  Once      11/11/18 1208          The patients clinical status was discussed at multidisplinary rounds, involving transplant surgery, transplant medicine, pharmacy, nursing, nutrition, and social work    Discharge Planning:  No Patient Care Coordination Note on file.      Bev Son, NP  Liver Transplant  [facility]

## 2018-12-08 NOTE — ASSESSMENT & PLAN NOTE
Avoid insulin stacking and hypoglycemia.  Nephrology following  Lab Results   Component Value Date    CREATININE 1.8 (H) 12/08/2018

## 2018-12-09 LAB
ALBUMIN SERPL BCP-MCNC: 2.2 G/DL
ALP SERPL-CCNC: 182 U/L
ALT SERPL W/O P-5'-P-CCNC: 18 U/L
ANION GAP SERPL CALC-SCNC: 8 MMOL/L
AST SERPL-CCNC: 14 U/L
BACTERIA BLD CULT: NORMAL
BACTERIA BLD CULT: NORMAL
BASOPHILS # BLD AUTO: 0.14 K/UL
BASOPHILS NFR BLD: 1.5 %
BILIRUB SERPL-MCNC: 2.2 MG/DL
BUN SERPL-MCNC: 25 MG/DL
CALCIUM SERPL-MCNC: 8.4 MG/DL
CHLORIDE SERPL-SCNC: 109 MMOL/L
CO2 SERPL-SCNC: 24 MMOL/L
CREAT SERPL-MCNC: 2.5 MG/DL
DIFFERENTIAL METHOD: ABNORMAL
EOSINOPHIL # BLD AUTO: 0 K/UL
EOSINOPHIL NFR BLD: 0.3 %
ERYTHROCYTE [DISTWIDTH] IN BLOOD BY AUTOMATED COUNT: 16.2 %
EST. GFR  (AFRICAN AMERICAN): 32.7 ML/MIN/1.73 M^2
EST. GFR  (NON AFRICAN AMERICAN): 28.3 ML/MIN/1.73 M^2
GLUCOSE SERPL-MCNC: 102 MG/DL
HCT VFR BLD AUTO: 26.1 %
HGB BLD-MCNC: 8 G/DL
IMM GRANULOCYTES # BLD AUTO: 0.04 K/UL
IMM GRANULOCYTES NFR BLD AUTO: 0.4 %
INR PPP: 1.2
LYMPHOCYTES # BLD AUTO: 0.6 K/UL
LYMPHOCYTES NFR BLD: 6.4 %
MAGNESIUM SERPL-MCNC: 1.6 MG/DL
MCH RBC QN AUTO: 30.2 PG
MCHC RBC AUTO-ENTMCNC: 30.7 G/DL
MCV RBC AUTO: 99 FL
MONOCYTES # BLD AUTO: 0.7 K/UL
MONOCYTES NFR BLD: 7 %
NEUTROPHILS # BLD AUTO: 8 K/UL
NEUTROPHILS NFR BLD: 84.4 %
NRBC BLD-RTO: 0 /100 WBC
PHOSPHATE SERPL-MCNC: 3.1 MG/DL
PLATELET # BLD AUTO: 171 K/UL
PMV BLD AUTO: 11.4 FL
POCT GLUCOSE: 103 MG/DL (ref 70–110)
POCT GLUCOSE: 127 MG/DL (ref 70–110)
POCT GLUCOSE: 175 MG/DL (ref 70–110)
POTASSIUM SERPL-SCNC: 4 MMOL/L
PROT SERPL-MCNC: 5.1 G/DL
PROTHROMBIN TIME: 12.3 SEC
RBC # BLD AUTO: 2.65 M/UL
SODIUM SERPL-SCNC: 141 MMOL/L
TACROLIMUS BLD-MCNC: 6.1 NG/ML
WBC # BLD AUTO: 9.52 K/UL

## 2018-12-09 PROCEDURE — 63600175 PHARM REV CODE 636 W HCPCS: Performed by: NURSE PRACTITIONER

## 2018-12-09 PROCEDURE — 99233 PR SUBSEQUENT HOSPITAL CARE,LEVL III: ICD-10-PCS | Mod: 24,,, | Performed by: NURSE PRACTITIONER

## 2018-12-09 PROCEDURE — 25000003 PHARM REV CODE 250: Performed by: STUDENT IN AN ORGANIZED HEALTH CARE EDUCATION/TRAINING PROGRAM

## 2018-12-09 PROCEDURE — 25000003 PHARM REV CODE 250: Performed by: NURSE PRACTITIONER

## 2018-12-09 PROCEDURE — 80053 COMPREHEN METABOLIC PANEL: CPT

## 2018-12-09 PROCEDURE — 85610 PROTHROMBIN TIME: CPT

## 2018-12-09 PROCEDURE — 25000242 PHARM REV CODE 250 ALT 637 W/ HCPCS: Performed by: NURSE PRACTITIONER

## 2018-12-09 PROCEDURE — 83735 ASSAY OF MAGNESIUM: CPT

## 2018-12-09 PROCEDURE — 97530 THERAPEUTIC ACTIVITIES: CPT

## 2018-12-09 PROCEDURE — 94664 DEMO&/EVAL PT USE INHALER: CPT

## 2018-12-09 PROCEDURE — 80197 ASSAY OF TACROLIMUS: CPT

## 2018-12-09 PROCEDURE — 94640 AIRWAY INHALATION TREATMENT: CPT

## 2018-12-09 PROCEDURE — 94761 N-INVAS EAR/PLS OXIMETRY MLT: CPT

## 2018-12-09 PROCEDURE — P9047 ALBUMIN (HUMAN), 25%, 50ML: HCPCS | Mod: JG | Performed by: NURSE PRACTITIONER

## 2018-12-09 PROCEDURE — 20600001 HC STEP DOWN PRIVATE ROOM

## 2018-12-09 PROCEDURE — 84100 ASSAY OF PHOSPHORUS: CPT

## 2018-12-09 PROCEDURE — 85025 COMPLETE CBC W/AUTO DIFF WBC: CPT

## 2018-12-09 PROCEDURE — 63600175 PHARM REV CODE 636 W HCPCS: Performed by: STUDENT IN AN ORGANIZED HEALTH CARE EDUCATION/TRAINING PROGRAM

## 2018-12-09 PROCEDURE — 99233 SBSQ HOSP IP/OBS HIGH 50: CPT | Mod: 24,,, | Performed by: NURSE PRACTITIONER

## 2018-12-09 PROCEDURE — 97112 NEUROMUSCULAR REEDUCATION: CPT

## 2018-12-09 PROCEDURE — 99900035 HC TECH TIME PER 15 MIN (STAT)

## 2018-12-09 PROCEDURE — 97110 THERAPEUTIC EXERCISES: CPT

## 2018-12-09 RX ORDER — ALBUMIN HUMAN 250 G/1000ML
25 SOLUTION INTRAVENOUS EVERY 8 HOURS
Status: COMPLETED | OUTPATIENT
Start: 2018-12-09 | End: 2018-12-10

## 2018-12-09 RX ORDER — LIDOCAINE 50 MG/G
1 PATCH TOPICAL DAILY
Status: DISCONTINUED | OUTPATIENT
Start: 2018-12-09 | End: 2018-12-12

## 2018-12-09 RX ORDER — BACITRACIN 500 [USP'U]/G
OINTMENT TOPICAL 3 TIMES DAILY
Status: DISCONTINUED | OUTPATIENT
Start: 2018-12-09 | End: 2018-12-18

## 2018-12-09 RX ORDER — PANTOPRAZOLE SODIUM 40 MG/1
40 TABLET, DELAYED RELEASE ORAL DAILY
Status: DISCONTINUED | OUTPATIENT
Start: 2018-12-09 | End: 2018-12-21

## 2018-12-09 RX ADMIN — IPRATROPIUM BROMIDE AND ALBUTEROL SULFATE 3 ML: .5; 3 SOLUTION RESPIRATORY (INHALATION) at 08:12

## 2018-12-09 RX ADMIN — ALBUMIN HUMAN 25 G: 0.25 SOLUTION INTRAVENOUS at 09:12

## 2018-12-09 RX ADMIN — HEPARIN SODIUM 5000 UNITS: 5000 INJECTION, SOLUTION INTRAVENOUS; SUBCUTANEOUS at 06:12

## 2018-12-09 RX ADMIN — ALBUMIN HUMAN 25 G: 0.25 SOLUTION INTRAVENOUS at 10:12

## 2018-12-09 RX ADMIN — BACITRACIN: 500 OINTMENT TOPICAL at 02:12

## 2018-12-09 RX ADMIN — HEPARIN SODIUM 5000 UNITS: 5000 INJECTION, SOLUTION INTRAVENOUS; SUBCUTANEOUS at 09:12

## 2018-12-09 RX ADMIN — ISAVUCONAZONIUM SULFATE 372 MG: 186 CAPSULE ORAL at 08:12

## 2018-12-09 RX ADMIN — IPRATROPIUM BROMIDE AND ALBUTEROL SULFATE 3 ML: .5; 3 SOLUTION RESPIRATORY (INHALATION) at 05:12

## 2018-12-09 RX ADMIN — IPRATROPIUM BROMIDE AND ALBUTEROL SULFATE 3 ML: .5; 3 SOLUTION RESPIRATORY (INHALATION) at 01:12

## 2018-12-09 RX ADMIN — NYSTATIN 500000 UNITS: 500000 SUSPENSION ORAL at 12:12

## 2018-12-09 RX ADMIN — LIDOCAINE 1 PATCH: 50 PATCH TOPICAL at 03:12

## 2018-12-09 RX ADMIN — PANTOPRAZOLE SODIUM 40 MG: 40 TABLET, DELAYED RELEASE ORAL at 10:12

## 2018-12-09 RX ADMIN — NYSTATIN 500000 UNITS: 500000 SUSPENSION ORAL at 08:12

## 2018-12-09 RX ADMIN — TRAZODONE HYDROCHLORIDE 25 MG: 50 TABLET ORAL at 09:12

## 2018-12-09 RX ADMIN — NYSTATIN 500000 UNITS: 500000 SUSPENSION ORAL at 09:12

## 2018-12-09 RX ADMIN — HEPARIN SODIUM 5000 UNITS: 5000 INJECTION, SOLUTION INTRAVENOUS; SUBCUTANEOUS at 02:12

## 2018-12-09 RX ADMIN — SODIUM BICARBONATE 650 MG TABLET 650 MG: at 08:12

## 2018-12-09 RX ADMIN — TIZANIDINE 2 MG: 2 TABLET ORAL at 03:12

## 2018-12-09 RX ADMIN — URSODIOL 300 MG: 300 CAPSULE ORAL at 08:12

## 2018-12-09 RX ADMIN — URSODIOL 300 MG: 300 CAPSULE ORAL at 09:12

## 2018-12-09 RX ADMIN — SODIUM BICARBONATE 650 MG TABLET 650 MG: at 05:12

## 2018-12-09 RX ADMIN — NYSTATIN 500000 UNITS: 500000 SUSPENSION ORAL at 05:12

## 2018-12-09 RX ADMIN — TACROLIMUS 3 MG: 1 CAPSULE ORAL at 08:12

## 2018-12-09 RX ADMIN — PREDNISONE 10 MG: 5 TABLET ORAL at 08:12

## 2018-12-09 RX ADMIN — TIZANIDINE 2 MG: 2 TABLET ORAL at 09:12

## 2018-12-09 RX ADMIN — BACITRACIN: 500 OINTMENT TOPICAL at 09:12

## 2018-12-09 RX ADMIN — TACROLIMUS 3 MG: 1 CAPSULE ORAL at 05:12

## 2018-12-09 NOTE — PLAN OF CARE
Problem: Patient Care Overview  Goal: Plan of Care Review  Outcome: Ongoing (interventions implemented as appropriate)  No acute events. VSS.   Pt reports nausea with vomiting with every meal. Encouraged pt to sit up during meals and for 30 min after. Zofran IVP x 1. Poor appetite.   C/o lower back pain resolved with PRN tizanidine.   Fall precautions maintained. Bed wheels locked, bed in lowest position, upper SR up x 2, call light in reach, non-skid socks when OOB. Instructed pt to call for assistance as needed. Will cont to monitor.

## 2018-12-09 NOTE — PROGRESS NOTES
[facility]  Liver Transplant  Progress Note    Patient Name: Femi Enciso  MRN: [id number]  Admission Date: 10/27/2018  Hospital Length of Stay: 43 days  Code Status: Full Code  Primary Care Provider: Primary Doctor No  Post-Operative Day: 28    ORGAN:   LIVER  Disease Etiology: Alcoholic Cirrhosis  Donor Type:    - Brain Death  CDC High Risk:   No  Donor CMV Status:   Donor CMV Status: Positive  Donor HBcAB:   Negative  Donor HCV Status:   Negative  Donor HBV JAVIER: Negative  Donor HCV JAVIER: Negative  Whole or Partial: Whole Liver  Biliary Anastomosis: End to End  Arterial Anatomy: Standard  Subjective:     History of Present Illness:  Femi Enciso is a 53yr old male with PMH of acute ETOH hepatitis and DEL/ATN evolved to ESRD requiring HD (not candidate for KTX). He is now s/p liver transplant (SM induction, DBD, CMV D+/R-). Transplant surgery noted severe collateralization, adrenal gland firmly adhered to liver. Bare area of adrenal gland required several stitches and packing to obtain fair hemostasis (EBL 15L). OR take back  for bleeding from R adrenal bed in AM (significant clot in retroperitoneum, posterior to R hepatic lobe, tract posterior to the R kidney containing significant clot, small bleeding area of retroperitoneal fat) then returned to surgery same day for hemorrhaging closed with wound vac with plans to return to OR 24-48 hours for closure (retroperitoneal /retrorenal/retrocolic hematoma on the right ). Raw retroperitoneal bleeding. Suture ligatures placed, argon beam cautery, evarest placed in retroperitoneum behind cava and right kidney. Significant oozing from upper right adrenal gland, not amenable to ligation, that portion of the adrenal gland was resected with cautery). OR take back  for washout and incisional closure, no significant bleeding or hematoma found. OR cultures   from body fluid grew enterococcus faecium VRE. ID was consulted,  started on  daptomycin for VRE treatment and cefepime/flagyl to cover for IA ty in bile. Patient with significant leukocytosis with peak on  at 48K prompting drainage of R subphrenic fluid collection, perc drain placed, culture grew VRE. Stroke code called  for lethargy and unresponsive, CT head without evidence of acute ischemic changes and CTA chest negative, vascular neurology was consulted recommended MRI brain, but patient's AMS improved shortly after event. Nephrology consulted for ESRD requiring HD. Patient overall improved on antibiotic regimen, leukocytosis and AMS improved. He was transferred to [facility] on POD #15.     Hospital Course:  Interval History:  No acute events overnight. Mental status improving daily. Family agrees mental status better.  LFTs at this time wnl.  T bili remains 2.4.  T bili going into transplant 37.  Per family, he is sleeping well on Trazodone on  dose to 25 mg.  Psych was consulted and apprec recs.  Pt taking Zanaflex as needed w good mgt of back pain.  Cr remains elevated.  HD today.  Pitting edema to BLE slightly better.  Last transfused 1 u PRBC on  w appropriate response.  WBC has normalized.  Blood cx 12/4 NGTD, urine cx 12/4 NGTD.  Cefepime x 1 week (end date 18).  Cont Linezolid for VRE thru 18.    Pt remains afebrile. Continues to improve physically, will need SNF consult for placement.   Monitor.         Scheduled Meds:   albumin human 25%  25 g Intravenous Q8H    albuterol-ipratropium  3 mL Nebulization Q4H WAKE    bacitracin   Topical (Top) TID    ergocalciferol  50,000 Units Oral Q7 Days    heparin (porcine)  5,000 Units Subcutaneous Q8H    isavuconazonium sulfate  372 mg Oral Daily    lidocaine HCL 10 mg/ml (1%)  1 mL Intradermal Once    nystatin  500,000 Units Oral QID (WM & HS)    pantoprazole  40 mg Oral Daily    [START ON 12/10/2018] predniSONE  5 mg Oral Daily    Followed by    [START ON 2018] predniSONE  2.5 mg Oral Daily     sodium bicarbonate  650 mg Oral BID    sulfamethoxazole-trimethoprim 400-80mg  1 tablet Oral Every Mon, Wed, Fri    tacrolimus  3 mg Oral BID    traZODone  25 mg Oral QHS    ursodiol  300 mg Oral BID    valganciclovir 50 mg/ml  100 mg Oral Once per day on Mon Wed Fri     Continuous Infusions:  PRN Meds:albuterol-ipratropium, bisacodyl, dextrose 50%, dextrose 50%, glucagon (human recombinant), glucose, glucose, heparin (porcine), insulin aspart U-100, midodrine, ondansetron, tiZANidine, traZODone    Review of Systems   Constitutional: Positive for activity change, appetite change (poor) and fatigue. Negative for fever.   HENT: Positive for voice change. Negative for facial swelling.    Eyes: Negative.    Respiratory: Positive for shortness of breath (w exertion). Negative for apnea, cough, choking, chest tightness and wheezing.    Cardiovascular: Negative.  Negative for chest pain, palpitations and leg swelling.   Gastrointestinal: Positive for abdominal distention. Negative for abdominal pain, constipation, diarrhea, nausea and vomiting.   Genitourinary: Positive for decreased urine volume. Negative for difficulty urinating, dysuria, hematuria and urgency.   Musculoskeletal: Negative.  Negative for back pain, gait problem, neck pain and neck stiffness.   Skin: Positive for wound. Negative for color change and pallor.   Allergic/Immunologic: Positive for immunocompromised state.   Neurological: Positive for weakness. Negative for dizziness, seizures, light-headedness and headaches.   Psychiatric/Behavioral: Positive for decreased concentration and dysphoric mood. Negative for behavioral problems, confusion, hallucinations, sleep disturbance and suicidal ideas. The patient is not nervous/anxious.      Objective:     Vital Signs (Most Recent):  Temp: 98.9 °F (37.2 °C) (12/09/18 0746)  Pulse: 81 (12/09/18 0852)  Resp: (!) 28 (12/09/18 0852)  BP: (!) 133/99 (12/09/18 0745)  SpO2: 100 % (12/09/18 0852) Vital  Signs (24h Range):  Temp:  [98 °F (36.7 °C)-98.9 °F (37.2 °C)] 98.9 °F (37.2 °C)  Pulse:  [] 81  Resp:  [20-31] 28  SpO2:  [96 %-100 %] 100 %  BP: ()/(64-99) 133/99     Weight: 75 kg (165 lb 5.5 oz)  Body mass index is 23.72 kg/m².    Intake/Output - Last 3 Shifts       12/07 0700 - 12/08 0659 12/08 0700 - 12/09 0659 12/09 0700 - 12/10 0659    P.O. 1020 720     I.V. (mL/kg)  0 (0)     Other 600 0     Total Intake(mL/kg) 1620 (21.9) 720 (9.6)     Urine (mL/kg/hr) 75 (0) 0 (0)     Emesis/NG output 0      Other 3150      Stool 0 0     Blood 0      Total Output 3225 0     Net -1605 +720            Urine Occurrence 0 x 0 x     Stool Occurrence 1 x 2 x     Emesis Occurrence 0 x            Physical Exam   Constitutional: He is oriented to person, place, and time. He appears well-developed. No distress.   HENT:   Head: Normocephalic and atraumatic.   White drainage noted to back of throat and roof on mouth, voice hoarse   Eyes: Pupils are equal, round, and reactive to light. Scleral icterus is present.   Neck: Normal range of motion. Neck supple. No JVD present.   Cardiovascular: Normal rate, regular rhythm and normal heart sounds.   No murmur heard.  Pulmonary/Chest: Effort normal. No respiratory distress. He has decreased breath sounds in the right lower field and the left lower field. He has no wheezes. He exhibits no tenderness.   Taking short breaths, IS up to 500   Abdominal: Soft. Bowel sounds are normal. He exhibits no distension. There is tenderness.   Chevron inc ECTOR w/ staples  RLQ JOSE A drain and percutaneous drain site w suture CDI.    Musculoskeletal: Normal range of motion. He exhibits edema (2+ BLE). He exhibits no tenderness.   Neurological: He is alert and oriented to person, place, and time. He has normal reflexes.   Skin: Skin is warm and dry. Capillary refill takes 2 to 3 seconds. He is not diaphoretic. There is pallor.   Psychiatric: His affect is labile. He is slowed. Cognition and memory are  impaired.   Nursing note and vitals reviewed.      Laboratory:  Immunosuppressants         Stop Route Frequency     tacrolimus capsule 3 mg      -- Oral 2 times daily        CBC:   Recent Labs   Lab 12/09/18  0644   WBC 9.52   RBC 2.65*   HGB 8.0*   HCT 26.1*      MCV 99*   MCH 30.2   MCHC 30.7*     CMP:   Recent Labs   Lab 12/09/18  0644      CALCIUM 8.4*   ALBUMIN 2.2*   PROT 5.1*      K 4.0   CO2 24      BUN 25*   CREATININE 2.5*   ALKPHOS 182*   ALT 18   AST 14   BILITOT 2.2*     Coagulation:   Recent Labs   Lab 12/09/18  0644   INR 1.2     Tacrolimus Lvl   Date Value Ref Range Status   12/09/2018 6.1 5.0 - 15.0 ng/mL Final     Comment:     Testing performed by Liquid Chromatography-Tandem  Mass Spectrometry (LC-MS/MS).  This test was developed and its performance characteristics  determined by [facility], Department of Pathology  and Laboratory Medicine in a manner consistent with CLIA  requirements. It has not been cleared or approved by the [place]  Food and Drug Administration.  This test is used for clinical   purposes.  It should not be regarded as investigational or for  research.     12/08/2018 3.5 (L) 5.0 - 15.0 ng/mL Final     Comment:     Testing performed by Liquid Chromatography-Tandem  Mass Spectrometry (LC-MS/MS).  This test was developed and its performance characteristics  determined by [facility], Department of Pathology  and Laboratory Medicine in a manner consistent with CLIA  requirements. It has not been cleared or approved by the [place]  Food and Drug Administration.  This test is used for clinical   purposes.  It should not be regarded as investigational or for  research.     12/07/2018 1.9 (L) 5.0 - 15.0 ng/mL Final     Comment:     Testing performed by Liquid Chromatography-Tandem  Mass Spectrometry (LC-MS/MS).  This test was developed and its performance characteristics  determined by [facility], Department of Pathology  and  Laboratory Medicine in a manner consistent with CLIA  requirements. It has not been cleared or approved by the [place]  Food and Drug Administration.  This test is used for clinical   purposes.  It should not be regarded as investigational or for  research.     12/06/2018 3.3 (L) 5.0 - 15.0 ng/mL Final     Comment:     Testing performed by Liquid Chromatography-Tandem  Mass Spectrometry (LC-MS/MS).  This test was developed and its performance characteristics  determined by [facility], Department of Pathology  and Laboratory Medicine in a manner consistent with CLIA  requirements. It has not been cleared or approved by the [place]  Food and Drug Administration.  This test is used for clinical   purposes.  It should not be regarded as investigational or for  research.           Labs within the past 24 hours have been reviewed.    Diagnostic Results:  None    Assessment/Plan:     * S/P liver transplant    - 53 year old male with history of ESLD 2/2 ETOH cirrhosis who is s/p liver transplant c/b take-back x 3 for bleeding most recently 11/18.  - LFTs now improving slowly.  T bili was 37.6 day of transplant.  - Pt remains with significant debility. Pt will need SNF placement when medically stable.   - Appetite slowly improving.  Tolerating supplements.  Encourage PO intake.   - Drain placed during take back. Drain placed to intra-abdominal abscess on 11/22.   - Fluid from collection noted with enterococcus VRE. Cont with antibxs per ID.  De escalated to PO on 11/29/18.    - Abdominal pain well controlled, and having BMs.    - HD per nephrology.   - Muscle relaxer started and will hold off on oxycodone for now.   - LFTs remain stable.  T bili 2.2, was 37 prior to txp.         Delirium    - Pt with intermittent confusion since transplant was improving slowly.   - Pt with some lethargy and confusion on 11/21. Head CT negative.   - AAOx3. More alert and awake on 11/27.   - AAOx4 on 11/29.  He was happy he could remember  what day it is today.    - confusion worse over the wkd.  Seroquel d/c'd 11/30/18.  Trazodone for insomnia ordered w PRN dose.    - re consulted psych, awaiting recommendations.   - delirium could be d/t rising prograf.  AMS improved since holding Prograf.    - CT head 12/4 with no acute findings.   - Restarted Prograf 12/6- will need to monitor mental status closely.     Moderate malnutrition    - muscle wasting noted.  Appetite on and off.  Cont supplements.  Dietician following.  Apprec recs.         Acute renal failure with acute tubular necrosis superimposed on stage 3 chronic kidney disease    - Renal function slow to recover post-op.   - Nephrology consulted for management.   - Cont with HD as per nephrology recs.  Apprec recs.    - HD on 11/27 w/ 2L off. Pt tolerated well.    - Lasix 160 mg challenge 11/28 w/ minimal output.    - HD performed on 12/3 at bedside. 1.5 removed, tolerated.   - Strict I&Os.      Intra-abdominal abscess    - Significant increase in WBC post-op.   - OR cultures from 11/18 noted with enterococcus VRE.   - Abdominal CT performed at that time with fluid collection and drain placed on 11/22 for drainage.   - Intra-abdominal abscess culture also with enterococcus VRE.   - ID consulted and pt placed on antibxs for treatment. Pt was on Cefepime, Daptomycin, and Fluconazole for treatment.    - Pt remains with RLQ drains (one JOSE A and one Percutaneous).  One JOSE A removed 11/28.  Perc drain in placed draining tan, clear drainage.  WBC slowly improving.   - Liver US on 11/26 reviewed.   - Abdominal CT performed 11/27 and reviewed. Fluid collection noted with improvement on CT.  ID following and reassured by findings.    - Dapto, Cefepime and Flagyl changed 11/30/18 to Linezolid 600 mg PO q 12 hr x 10 days per ID.     - added back cefepime after thora.  ID re consulted.  -Plan to complete Zyvox x 10 days per ID thru 12/9/18. Monitor.      Long-term use of immunosuppressant medication    - Cont  with prograf. Draw prograf level daily and adjust dose as needed to maintain a therapeutic level.        At risk for opportunistic infections    - Cont with bactrim and valcyte for protection against opportunistic infections.   - cont Isavuconazonium.     Leucocytosis    - See intra-abdominal abscess.  - wbc improving, cont w delirium.    - Repeat cx 12/4 with NGTD.  Cont Linezolid and Cefepime per ID.         Prophylactic immunotherapy    - See long term immunosuppression.        Weakness    - Pt remains significantly debilitated.   - PT/OT for strengthening.   - Currently will need SNF for placement and further rehabilitation.  Insurance does not cover Rehab.       Pleural effusion associated with hepatic disorder    - c/o increased pain w deep breath today to right side.  CXR showed increased opacity in the inferior hemothorax on the right side since 11/26/2018.  Pulse ox 97-99% on RA.  Cont to monitor    - continues to have fluid to RLL.  WBC remains elevated.    - thoracentesis performed on 11/30. Fluid noted with 4k WBC, 93 SEGS. cx no growth to date.  Cefepime added for treatment.  - Chest CT showing right pleural effusion improved, left w increased pleural effusion.   - Per ID, will treat empyema w Cefepime thru 12/8.     Type 2 diabetes mellitus without complication    - Endocrine consulted for management. Appreciate recs.        Anemia of chronic disease    - H&H trending down. Transfused 1 unit of PRBC with HD- . Monitor with daily labs.   - Chest/Abd/pelvis CT 12/4- no fluid to be drainged per transplant surgeon.  No source of bleeding.     - last liver US 11/27. Evolving peritransplant hematomas with anechoic fluid collection adjacent to the right hepatic lobe.  Small volume perihepatic free fluid.     DEL (acute kidney injury)    - DEL for over 2 weeks prior to transplant. Nephrology was consulted.     - Post transplant, Nephrology cont to follow.  He received HD last on 11/30 for metabolic clearance  and fluid management.     - Attempted Lasix challenge (160 mg) x1 on 11/28- pt diuresed 105 cc.    - HD 12/3- removed 1.5 liter.  Cont strict I/O's.  Monitor closely.  - Lasix challenge 12/4 with minimal UOP  - Last HD 12/5- 2.3L removed.    - Crackles to mell bases and dependent edema.  Lasix 100mg and albumin x3 12/6/18.  - HD last 12/7/18     Acute renal failure    - DEL past 2 weeks.  Pt on Midodrine.  Last HD 11/9/18- 0 fluid removed 2/2 hypotension.  - Nephrology following for HD-  Pt cont to have hypotenstion worse w standing.  Midodrine 10 mg PRN for hypotension during HD ordered.           VTE Risk Mitigation (From admission, onward)        Ordered     heparin (porcine) injection 5,000 Units  Every 8 hours      11/30/18 1149     heparin (porcine) injection 1,000 Units  As needed (PRN)      11/25/18 1428     IP VTE HIGH RISK PATIENT  Once      11/11/18 1208          The patients clinical status was discussed at multidisplinary rounds, involving transplant surgery, transplant medicine, pharmacy, nursing, nutrition, and social work    Discharge Planning: will need snf consult for placement  No Patient Care Coordination Note on file.      Bev Son, NP  Liver Transplant  [facility]

## 2018-12-09 NOTE — ASSESSMENT & PLAN NOTE
- 53 year old male with history of ESLD 2/2 ETOH cirrhosis who is s/p liver transplant c/b take-back x 3 for bleeding most recently 11/18.  - LFTs now improving slowly.  T bili was 37.6 day of transplant.  - Pt remains with significant debility. Pt will need SNF placement when medically stable.   - Appetite slowly improving.  Tolerating supplements.  Encourage PO intake.   - Drain placed during take back. Drain placed to intra-abdominal abscess on 11/22.   - Fluid from collection noted with enterococcus VRE. Cont with antibxs per ID.  De escalated to PO on 11/29/18.    - Abdominal pain well controlled, and having BMs.    - HD per nephrology.   - Muscle relaxer started and will hold off on oxycodone for now.   - LFTs remain stable.  T bili 2.2, was 37 prior to txp.

## 2018-12-09 NOTE — SUBJECTIVE & OBJECTIVE
Scheduled Meds:   albumin human 25%  25 g Intravenous Q8H    albuterol-ipratropium  3 mL Nebulization Q4H WAKE    bacitracin   Topical (Top) TID    ergocalciferol  50,000 Units Oral Q7 Days    heparin (porcine)  5,000 Units Subcutaneous Q8H    isavuconazonium sulfate  372 mg Oral Daily    lidocaine HCL 10 mg/ml (1%)  1 mL Intradermal Once    nystatin  500,000 Units Oral QID (WM & HS)    pantoprazole  40 mg Oral Daily    [START ON 12/10/2018] predniSONE  5 mg Oral Daily    Followed by    [START ON 12/17/2018] predniSONE  2.5 mg Oral Daily    sodium bicarbonate  650 mg Oral BID    sulfamethoxazole-trimethoprim 400-80mg  1 tablet Oral Every Mon, Wed, Fri    tacrolimus  3 mg Oral BID    traZODone  25 mg Oral QHS    ursodiol  300 mg Oral BID    valganciclovir 50 mg/ml  100 mg Oral Once per day on Mon Wed Fri     Continuous Infusions:  PRN Meds:albuterol-ipratropium, bisacodyl, dextrose 50%, dextrose 50%, glucagon (human recombinant), glucose, glucose, heparin (porcine), insulin aspart U-100, midodrine, ondansetron, tiZANidine, traZODone    Review of Systems   Constitutional: Positive for activity change, appetite change (poor) and fatigue. Negative for fever.   HENT: Positive for voice change. Negative for facial swelling.    Eyes: Negative.    Respiratory: Positive for shortness of breath (w exertion). Negative for apnea, cough, choking, chest tightness and wheezing.    Cardiovascular: Negative.  Negative for chest pain, palpitations and leg swelling.   Gastrointestinal: Positive for abdominal distention. Negative for abdominal pain, constipation, diarrhea, nausea and vomiting.   Genitourinary: Positive for decreased urine volume. Negative for difficulty urinating, dysuria, hematuria and urgency.   Musculoskeletal: Negative.  Negative for back pain, gait problem, neck pain and neck stiffness.   Skin: Positive for wound. Negative for color change and pallor.   Allergic/Immunologic: Positive for  immunocompromised state.   Neurological: Positive for weakness. Negative for dizziness, seizures, light-headedness and headaches.   Psychiatric/Behavioral: Positive for decreased concentration and dysphoric mood. Negative for behavioral problems, confusion, hallucinations, sleep disturbance and suicidal ideas. The patient is not nervous/anxious.      Objective:     Vital Signs (Most Recent):  Temp: 98.9 °F (37.2 °C) (12/09/18 0746)  Pulse: 81 (12/09/18 0852)  Resp: (!) 28 (12/09/18 0852)  BP: (!) 133/99 (12/09/18 0745)  SpO2: 100 % (12/09/18 0852) Vital Signs (24h Range):  Temp:  [98 °F (36.7 °C)-98.9 °F (37.2 °C)] 98.9 °F (37.2 °C)  Pulse:  [] 81  Resp:  [20-31] 28  SpO2:  [96 %-100 %] 100 %  BP: ()/(64-99) 133/99     Weight: 75 kg (165 lb 5.5 oz)  Body mass index is 23.72 kg/m².    Intake/Output - Last 3 Shifts       12/07 0700 - 12/08 0659 12/08 0700 - 12/09 0659 12/09 0700 - 12/10 0659    P.O. 1020 720     I.V. (mL/kg)  0 (0)     Other 600 0     Total Intake(mL/kg) 1620 (21.9) 720 (9.6)     Urine (mL/kg/hr) 75 (0) 0 (0)     Emesis/NG output 0      Other 3150      Stool 0 0     Blood 0      Total Output 3225 0     Net -1605 +720            Urine Occurrence 0 x 0 x     Stool Occurrence 1 x 2 x     Emesis Occurrence 0 x            Physical Exam   Constitutional: He is oriented to person, place, and time. He appears well-developed. No distress.   HENT:   Head: Normocephalic and atraumatic.   White drainage noted to back of throat and roof on mouth, voice hoarse   Eyes: Pupils are equal, round, and reactive to light. Scleral icterus is present.   Neck: Normal range of motion. Neck supple. No JVD present.   Cardiovascular: Normal rate, regular rhythm and normal heart sounds.   No murmur heard.  Pulmonary/Chest: Effort normal. No respiratory distress. He has decreased breath sounds in the right lower field and the left lower field. He has no wheezes. He exhibits no tenderness.   Taking short breaths, IS up  to 500   Abdominal: Soft. Bowel sounds are normal. He exhibits no distension. There is tenderness.   Chevron inc ECTOR w/ staples  RLQ JOSE A drain and percutaneous drain site w suture CDI.    Musculoskeletal: Normal range of motion. He exhibits edema (2+ BLE). He exhibits no tenderness.   Neurological: He is alert and oriented to person, place, and time. He has normal reflexes.   Skin: Skin is warm and dry. Capillary refill takes 2 to 3 seconds. He is not diaphoretic. There is pallor.   Psychiatric: His affect is labile. He is slowed. Cognition and memory are impaired.   Nursing note and vitals reviewed.      Laboratory:  Immunosuppressants         Stop Route Frequency     tacrolimus capsule 3 mg      -- Oral 2 times daily        CBC:   Recent Labs   Lab 12/09/18  0644   WBC 9.52   RBC 2.65*   HGB 8.0*   HCT 26.1*      MCV 99*   MCH 30.2   MCHC 30.7*     CMP:   Recent Labs   Lab 12/09/18  0644      CALCIUM 8.4*   ALBUMIN 2.2*   PROT 5.1*      K 4.0   CO2 24      BUN 25*   CREATININE 2.5*   ALKPHOS 182*   ALT 18   AST 14   BILITOT 2.2*     Coagulation:   Recent Labs   Lab 12/09/18  0644   INR 1.2     Tacrolimus Lvl   Date Value Ref Range Status   12/09/2018 6.1 5.0 - 15.0 ng/mL Final     Comment:     Testing performed by Liquid Chromatography-Tandem  Mass Spectrometry (LC-MS/MS).  This test was developed and its performance characteristics  determined by [facility], Department of Pathology  and Laboratory Medicine in a manner consistent with CLIA  requirements. It has not been cleared or approved by the [place]  Food and Drug Administration.  This test is used for clinical   purposes.  It should not be regarded as investigational or for  research.     12/08/2018 3.5 (L) 5.0 - 15.0 ng/mL Final     Comment:     Testing performed by Liquid Chromatography-Tandem  Mass Spectrometry (LC-MS/MS).  This test was developed and its performance characteristics  determined by [facility],  Department of Pathology  and Laboratory Medicine in a manner consistent with CLIA  requirements. It has not been cleared or approved by the [place]  Food and Drug Administration.  This test is used for clinical   purposes.  It should not be regarded as investigational or for  research.     12/07/2018 1.9 (L) 5.0 - 15.0 ng/mL Final     Comment:     Testing performed by Liquid Chromatography-Tandem  Mass Spectrometry (LC-MS/MS).  This test was developed and its performance characteristics  determined by [facility], Department of Pathology  and Laboratory Medicine in a manner consistent with CLIA  requirements. It has not been cleared or approved by the [place]  Food and Drug Administration.  This test is used for clinical   purposes.  It should not be regarded as investigational or for  research.     12/06/2018 3.3 (L) 5.0 - 15.0 ng/mL Final     Comment:     Testing performed by Liquid Chromatography-Tandem  Mass Spectrometry (LC-MS/MS).  This test was developed and its performance characteristics  determined by [facility], Department of Pathology  and Laboratory Medicine in a manner consistent with CLIA  requirements. It has not been cleared or approved by the [place]  Food and Drug Administration.  This test is used for clinical   purposes.  It should not be regarded as investigational or for  research.           Labs within the past 24 hours have been reviewed.    Diagnostic Results:  None

## 2018-12-09 NOTE — PLAN OF CARE
Problem: Occupational Therapy Goal  Goal: Occupational Therapy Goal  Goals to be met by: 12/18/18     Patient will increase functional independence with ADLs by performing:    UE Dressing with Supervision.  LE Dressing with Minimal Assistance.  Grooming while standing at sink with Stand-by Assistance.  Toileting from toilet with Minimal Assistance for hygiene and clothing management.   Toilet transfer to toilet with Stand-by Assistance.  Upper extremity  theraband exercise program x  15 reps  with independence. Met 12/9        Outcome: Ongoing (interventions implemented as appropriate)  Continue with POC. Pt progressing well with goals.  Julia ladd OT  12/9/2018

## 2018-12-09 NOTE — CARE UPDATE
BG goal 140-180. BG at goal without insulin. PO intake remains poor; ~25%      BG monitoring AC/HS and low dose correction scale given kidney function.      ** Please call Endocrine for any BG related issues **

## 2018-12-09 NOTE — PLAN OF CARE
Problem: Adult Inpatient Plan of Care  Goal: Plan of Care Review  Pt free from fall or injury so far this shift. Instructed to call if assistance needed, verbalized understanding.   BG checked AC/HS. Last , no SSI coverage needed.   Reports mild nausea this AM, no emesis, no PRN antiemetic wanted.   LE's unchanged.   Albumin X 3 ordered. Creat 2.5, Plan for HD tomorrow.   Denies pain or discomfort. Pt requesting Lidocaine patch in place of Tizanidine, Lidocaine patch placed.   Up to the chair for several hours and worked with OT today.   Afebrile. Old suture site red, Bacitracin ordered. Daily labs monitored.

## 2018-12-09 NOTE — PLAN OF CARE
"Problem: Patient Care Overview  Goal: Plan of Care Review  Dx: Liver transplant (11/11)  hx: ESLD, ETOH abuse; Severe depression.    11/12: liver transplant; extubated. Products given in OR and HD in OR. CRRT nocturnal.  11/13: CRRT nocturnal held; lines removed. FFP x1 given. Pulled out 2 JOSE A drains.   11/14: 3 PRBC's given for hgb 6; liver US shows potential hematoma. Trend CBC. Extreme agitation/attempted to "kill self" by holding breath for as long as possible multiple times. IV ativan/haldol given. Nocturnal CRRT restarted  Potential washout  To be determined.    11/16: OR for exlap and washout--1U PRBCs and 1 plts; sent back to OR--3 U PRBCs, 1FFP, 1cryo; abd open and wound vac placed  11/17: CT chest, abd, pelvis, levo off 2 units PRBCs, 2 units platelets, 1 unit FFP   11/18: OR for closure, extubated   11/19: clear liquid diet   11/20: TPN/Lipids d/c'd, renal diet, transfer orders  11/21: Head and chest CT  11/22: Pt to IR for drain placement  11/23: HD trial  11/25: HD 2 Liters removed  Nursing:   -160          Pt AAOx4. VSS on visi monitor. No TELE.  No c/o pain or SOB.   Some nausea this am resolved on its own.    Little appetite noted.  Pt only eating bites of meals.  Blood sugars checked ACHS.    Staples removed from chevron, steri-strips in place.  Approximated.  No acute events/falls/injuries. See flowsheet for further assessment findings. Will montior.          "

## 2018-12-09 NOTE — PT/OT/SLP PROGRESS
Occupational Therapy   Treatment    Name: Femi Enciso  MRN: [id number]  Admitting Diagnosis:  S/P liver transplant  21 Days Post-Op    Recommendations:     Discharge Recommendations: nursing facility, skilled  Discharge Equipment Recommendations:  (TBD pending progress)  Barriers to discharge:  None    Subjective     Pain/Comfort:  · Pain Rating 1: 0/10  · Pain Rating Post-Intervention 1: 0/10    Objective:     Communicated with: RN prior to session.  Patient found with all lines intact, call button in reach and Rn notified and telemetry, pulse ox (continuous) upon OT entry to room.    General Precautions: Standard, fall   Orthopedic Precautions:N/A   Braces: N/A     Occupational Performance:    Bed Mobility:    · Patient completed Rolling/Turning to Right with stand by assistance and with side rail  · Patient completed Supine to Sit with minimum assistance     Functional Mobility/Transfers:  · Patient completed Sit <> Stand Transfer with moderate assistance and of 2 persons  with  no assistive device   -- Pt completed x1 trial from EOB; x2 trials for bedside chair ( pt requires verbal tactile cues for hip and trunk extension  -- Pt tolerated standing for ~30 seconds each trial with moderatex2 for static balance  -- 3rd standing trials pt completed weight shifting to R/L side for ~30 seconds    · Patient completed Bed <> Chair Transfer using Stand Pivot technique with moderate assistance and of 2 persons with no assistive device  · Functional Mobility: Pt took ~2 small steps during stand pivot transfer ( pt required blocking of B knees)    Activities of Daily Living:  · Upper Body Dressing: minimum assistance to milady gown like jacket while seated EOB    AMPA 6 Click ADL: 16    Treatment & Education:  -Pt edu on OT role/POC  -Importance of OOB activity with staff assistance ( UIC or EOB with each meal)   -Safety during functional t/f and mobility  - White board updated  - Multiple self care tasks completed-- assistance  level noted above  - All questions/concerns answered within OT scope of practice  - Edu pt/family on BUE HEP to complete at least 2x per day ( pt gonsalo'd understanding)   - Pt completed BUE exercises  while seated [facility] including: 1x15 reps in shoulder flexion, elbow flexion/extension, chest press, and horizontal abduction. Pt tolerated well with min rest breaks between each exercise.     Patient left up in chair with all lines intact, call button in reach and RN notified  Education:    Assessment:     Femi Enciso is a 53 y.o. male with a medical diagnosis of S/P liver transplant. Pt alert and motivated to participate with therapy. He presents with the following performance deficits affecting function are weakness, gait instability, impaired balance, impaired endurance, impaired self care skills, impaired functional mobilty, impaired cardiopulmonary response to activity, decreased safety awareness. Pt demo's good progress towards goals this date. He would benefit from SNF following d/c to continue to progress towards goals and improve quality of life.     Rehab Prognosis:  Good; patient would benefit from acute skilled OT services to address these deficits and reach maximum level of function.       Plan:     Patient to be seen 4 x/week to address the above listed problems via self-care/home management, therapeutic activities, therapeutic exercises, neuromuscular re-education  · Plan of Care Expires: 12/21/18  · Plan of Care Reviewed with: patient, spouse    This Plan of care has been discussed with the patient who was involved in its development and understands and is in agreement with the identified goals and treatment plan    GOALS:   Multidisciplinary Problems     Occupational Therapy Goals        Problem: Occupational Therapy Goal    Goal Priority Disciplines Outcome Interventions   Occupational Therapy Goal     OT, PT/OT Ongoing (interventions implemented as appropriate)    Description:  Goals to be met by:  12/18/18     Patient will increase functional independence with ADLs by performing:    UE Dressing with Supervision.  LE Dressing with Minimal Assistance.  Grooming while standing at sink with Stand-by Assistance.  Toileting from toilet with Minimal Assistance for hygiene and clothing management.   Toilet transfer to toilet with Stand-by Assistance.  Upper extremity  theraband exercise program x  15 reps  with independence. Met 12/9                 Problem: Occupational Therapy Goal    Goal Priority Disciplines Outcome Interventions   Occupational Therapy Goal     OT, PT/OT Ongoing (interventions implemented as appropriate)                    Time Tracking:     OT Date of Treatment: 12/09/18  OT Start Time: 0900  OT Stop Time: 0938  OT Total Time (min): 38 min    Billable Minutes:Therapeutic Activity 12  Therapeutic Exercise 15  Neuromuscular Re-education 11    Julia ladd OT  12/9/2018

## 2018-12-10 PROBLEM — F91.9 BEHAVIOR DISTURBANCE: Status: ACTIVE | Noted: 2018-12-10

## 2018-12-10 LAB
ABO + RH BLD: NORMAL
ALBUMIN SERPL BCP-MCNC: 2.9 G/DL
ALP SERPL-CCNC: 158 U/L
ALT SERPL W/O P-5'-P-CCNC: 15 U/L
ANION GAP SERPL CALC-SCNC: 11 MMOL/L
AST SERPL-CCNC: 11 U/L
BACTERIA SPEC ANAEROBE CULT: NORMAL
BASOPHILS # BLD AUTO: 0.1 K/UL
BASOPHILS NFR BLD: 1.1 %
BILIRUB SERPL-MCNC: 2 MG/DL
BLD GP AB SCN CELLS X3 SERPL QL: NORMAL
BLD PROD TYP BPU: NORMAL
BLOOD UNIT EXPIRATION DATE: NORMAL
BLOOD UNIT TYPE CODE: 5100
BLOOD UNIT TYPE: NORMAL
BUN SERPL-MCNC: 30 MG/DL
CALCIUM SERPL-MCNC: 8.8 MG/DL
CHLORIDE SERPL-SCNC: 104 MMOL/L
CO2 SERPL-SCNC: 22 MMOL/L
CODING SYSTEM: NORMAL
CREAT SERPL-MCNC: 3.2 MG/DL
DIFFERENTIAL METHOD: ABNORMAL
DISPENSE STATUS: NORMAL
EOSINOPHIL # BLD AUTO: 0 K/UL
EOSINOPHIL NFR BLD: 0.5 %
ERYTHROCYTE [DISTWIDTH] IN BLOOD BY AUTOMATED COUNT: 15.9 %
EST. GFR  (AFRICAN AMERICAN): 24.2 ML/MIN/1.73 M^2
EST. GFR  (NON AFRICAN AMERICAN): 21 ML/MIN/1.73 M^2
GLUCOSE SERPL-MCNC: 88 MG/DL
HCT VFR BLD AUTO: 24.3 %
HGB BLD-MCNC: 7.4 G/DL
IMM GRANULOCYTES # BLD AUTO: 0.03 K/UL
IMM GRANULOCYTES NFR BLD AUTO: 0.3 %
INR PPP: 1.2
IRON SERPL-MCNC: 17 UG/DL
LYMPHOCYTES # BLD AUTO: 0.6 K/UL
LYMPHOCYTES NFR BLD: 6.7 %
MAGNESIUM SERPL-MCNC: 1.9 MG/DL
MCH RBC QN AUTO: 30 PG
MCHC RBC AUTO-ENTMCNC: 30.5 G/DL
MCV RBC AUTO: 98 FL
MONOCYTES # BLD AUTO: 0.7 K/UL
MONOCYTES NFR BLD: 7.8 %
NEUTROPHILS # BLD AUTO: 7.4 K/UL
NEUTROPHILS NFR BLD: 83.6 %
NRBC BLD-RTO: 0 /100 WBC
PHOSPHATE SERPL-MCNC: 3.3 MG/DL
PLATELET # BLD AUTO: 168 K/UL
PMV BLD AUTO: 11.5 FL
POCT GLUCOSE: 114 MG/DL (ref 70–110)
POCT GLUCOSE: 68 MG/DL (ref 70–110)
POCT GLUCOSE: 70 MG/DL (ref 70–110)
POCT GLUCOSE: 81 MG/DL (ref 70–110)
POCT GLUCOSE: 90 MG/DL (ref 70–110)
POCT GLUCOSE: 92 MG/DL (ref 70–110)
POTASSIUM SERPL-SCNC: 4.1 MMOL/L
PROT SERPL-MCNC: 5.8 G/DL
PROTHROMBIN TIME: 12.1 SEC
RBC # BLD AUTO: 2.47 M/UL
SATURATED IRON: 27 %
SODIUM SERPL-SCNC: 137 MMOL/L
TACROLIMUS BLD-MCNC: 7.2 NG/ML
TOTAL IRON BINDING CAPACITY: 62 UG/DL
TRANS ERYTHROCYTES VOL PATIENT: NORMAL ML
TRANSFERRIN SERPL-MCNC: 42 MG/DL
WBC # BLD AUTO: 8.84 K/UL

## 2018-12-10 PROCEDURE — 99233 PR SUBSEQUENT HOSPITAL CARE,LEVL III: ICD-10-PCS | Mod: 24,,, | Performed by: NURSE PRACTITIONER

## 2018-12-10 PROCEDURE — 85025 COMPLETE CBC W/AUTO DIFF WBC: CPT

## 2018-12-10 PROCEDURE — 86901 BLOOD TYPING SEROLOGIC RH(D): CPT

## 2018-12-10 PROCEDURE — 99900035 HC TECH TIME PER 15 MIN (STAT)

## 2018-12-10 PROCEDURE — 36415 COLL VENOUS BLD VENIPUNCTURE: CPT

## 2018-12-10 PROCEDURE — 25000003 PHARM REV CODE 250: Performed by: NURSE PRACTITIONER

## 2018-12-10 PROCEDURE — P9021 RED BLOOD CELLS UNIT: HCPCS

## 2018-12-10 PROCEDURE — 83735 ASSAY OF MAGNESIUM: CPT

## 2018-12-10 PROCEDURE — 27000646 HC AEROBIKA DEVICE

## 2018-12-10 PROCEDURE — 25000003 PHARM REV CODE 250: Performed by: STUDENT IN AN ORGANIZED HEALTH CARE EDUCATION/TRAINING PROGRAM

## 2018-12-10 PROCEDURE — 90935 HEMODIALYSIS ONE EVALUATION: CPT | Mod: ,,, | Performed by: NURSE PRACTITIONER

## 2018-12-10 PROCEDURE — 84100 ASSAY OF PHOSPHORUS: CPT

## 2018-12-10 PROCEDURE — 99232 PR SUBSEQUENT HOSPITAL CARE,LEVL II: ICD-10-PCS | Mod: ,,, | Performed by: PSYCHIATRY & NEUROLOGY

## 2018-12-10 PROCEDURE — 63600175 PHARM REV CODE 636 W HCPCS: Performed by: NURSE PRACTITIONER

## 2018-12-10 PROCEDURE — 85610 PROTHROMBIN TIME: CPT

## 2018-12-10 PROCEDURE — 99232 SBSQ HOSP IP/OBS MODERATE 35: CPT | Mod: ,,, | Performed by: PSYCHIATRY & NEUROLOGY

## 2018-12-10 PROCEDURE — 25000003 PHARM REV CODE 250

## 2018-12-10 PROCEDURE — 25000242 PHARM REV CODE 250 ALT 637 W/ HCPCS: Performed by: NURSE PRACTITIONER

## 2018-12-10 PROCEDURE — P9047 ALBUMIN (HUMAN), 25%, 50ML: HCPCS | Mod: JG | Performed by: NURSE PRACTITIONER

## 2018-12-10 PROCEDURE — 86920 COMPATIBILITY TEST SPIN: CPT

## 2018-12-10 PROCEDURE — 20600001 HC STEP DOWN PRIVATE ROOM

## 2018-12-10 PROCEDURE — 80053 COMPREHEN METABOLIC PANEL: CPT

## 2018-12-10 PROCEDURE — 94664 DEMO&/EVAL PT USE INHALER: CPT

## 2018-12-10 PROCEDURE — 80197 ASSAY OF TACROLIMUS: CPT

## 2018-12-10 PROCEDURE — 83540 ASSAY OF IRON: CPT

## 2018-12-10 PROCEDURE — 90935 PR HEMODIALYSIS, ONE EVALUATION: ICD-10-PCS | Mod: ,,, | Performed by: NURSE PRACTITIONER

## 2018-12-10 PROCEDURE — 90935 HEMODIALYSIS ONE EVALUATION: CPT

## 2018-12-10 PROCEDURE — 63600175 PHARM REV CODE 636 W HCPCS: Performed by: STUDENT IN AN ORGANIZED HEALTH CARE EDUCATION/TRAINING PROGRAM

## 2018-12-10 PROCEDURE — 94640 AIRWAY INHALATION TREATMENT: CPT

## 2018-12-10 PROCEDURE — 63600175 PHARM REV CODE 636 W HCPCS: Performed by: TRANSPLANT SURGERY

## 2018-12-10 PROCEDURE — 99233 SBSQ HOSP IP/OBS HIGH 50: CPT | Mod: 24,,, | Performed by: NURSE PRACTITIONER

## 2018-12-10 RX ORDER — RAMELTEON 8 MG/1
8 TABLET ORAL NIGHTLY PRN
Status: DISCONTINUED | OUTPATIENT
Start: 2018-12-10 | End: 2018-12-31

## 2018-12-10 RX ORDER — TRAZODONE HYDROCHLORIDE 50 MG/1
50 TABLET ORAL NIGHTLY
Status: DISCONTINUED | OUTPATIENT
Start: 2018-12-10 | End: 2018-12-19

## 2018-12-10 RX ORDER — HYDROCODONE BITARTRATE AND ACETAMINOPHEN 500; 5 MG/1; MG/1
TABLET ORAL
Status: DISCONTINUED | OUTPATIENT
Start: 2018-12-10 | End: 2018-12-11

## 2018-12-10 RX ORDER — DEXTROSE MONOHYDRATE 25 G/50ML
INJECTION, SOLUTION INTRAVENOUS
Status: COMPLETED
Start: 2018-12-10 | End: 2018-12-10

## 2018-12-10 RX ORDER — ESCITALOPRAM OXALATE 10 MG/1
20 TABLET ORAL DAILY
Status: CANCELLED | OUTPATIENT
Start: 2018-12-10

## 2018-12-10 RX ORDER — SODIUM CHLORIDE 9 MG/ML
INJECTION, SOLUTION INTRAVENOUS ONCE
Status: COMPLETED | OUTPATIENT
Start: 2018-12-10 | End: 2018-12-10

## 2018-12-10 RX ORDER — TACROLIMUS 1 MG/1
4 CAPSULE ORAL 2 TIMES DAILY
Status: DISCONTINUED | OUTPATIENT
Start: 2018-12-10 | End: 2018-12-11

## 2018-12-10 RX ADMIN — DEXTROSE MONOHYDRATE 25 ML: 25 INJECTION, SOLUTION INTRAVENOUS at 05:12

## 2018-12-10 RX ADMIN — PANTOPRAZOLE SODIUM 40 MG: 40 TABLET, DELAYED RELEASE ORAL at 08:12

## 2018-12-10 RX ADMIN — IPRATROPIUM BROMIDE AND ALBUTEROL SULFATE 3 ML: .5; 3 SOLUTION RESPIRATORY (INHALATION) at 07:12

## 2018-12-10 RX ADMIN — HEPARIN SODIUM 5000 UNITS: 5000 INJECTION, SOLUTION INTRAVENOUS; SUBCUTANEOUS at 01:12

## 2018-12-10 RX ADMIN — IPRATROPIUM BROMIDE AND ALBUTEROL SULFATE 3 ML: .5; 3 SOLUTION RESPIRATORY (INHALATION) at 04:12

## 2018-12-10 RX ADMIN — BACITRACIN: 500 OINTMENT TOPICAL at 08:12

## 2018-12-10 RX ADMIN — LIDOCAINE 1 PATCH: 50 PATCH TOPICAL at 04:12

## 2018-12-10 RX ADMIN — NYSTATIN 500000 UNITS: 500000 SUSPENSION ORAL at 08:12

## 2018-12-10 RX ADMIN — MORPHINE 100 MG: 10 SOLUTION ORAL at 09:12

## 2018-12-10 RX ADMIN — TRAZODONE HYDROCHLORIDE 50 MG: 50 TABLET ORAL at 08:12

## 2018-12-10 RX ADMIN — SODIUM BICARBONATE 650 MG TABLET 650 MG: at 08:12

## 2018-12-10 RX ADMIN — BACITRACIN: 500 OINTMENT TOPICAL at 03:12

## 2018-12-10 RX ADMIN — TACROLIMUS 3 MG: 1 CAPSULE ORAL at 08:12

## 2018-12-10 RX ADMIN — ONDANSETRON 4 MG: 2 INJECTION INTRAMUSCULAR; INTRAVENOUS at 12:12

## 2018-12-10 RX ADMIN — TACROLIMUS 4 MG: 1 CAPSULE ORAL at 05:12

## 2018-12-10 RX ADMIN — SODIUM CHLORIDE 300 ML: 0.9 INJECTION, SOLUTION INTRAVENOUS at 09:12

## 2018-12-10 RX ADMIN — PREDNISONE 5 MG: 5 TABLET ORAL at 08:12

## 2018-12-10 RX ADMIN — TIZANIDINE 2 MG: 2 TABLET ORAL at 08:12

## 2018-12-10 RX ADMIN — URSODIOL 300 MG: 300 CAPSULE ORAL at 08:12

## 2018-12-10 RX ADMIN — SODIUM BICARBONATE 650 MG TABLET 650 MG: at 05:12

## 2018-12-10 RX ADMIN — HEPARIN SODIUM 5000 UNITS: 5000 INJECTION, SOLUTION INTRAVENOUS; SUBCUTANEOUS at 05:12

## 2018-12-10 RX ADMIN — ISAVUCONAZONIUM SULFATE 372 MG: 186 CAPSULE ORAL at 08:12

## 2018-12-10 RX ADMIN — ALBUMIN HUMAN 25 G: 0.25 SOLUTION INTRAVENOUS at 05:12

## 2018-12-10 RX ADMIN — SULFAMETHOXAZOLE AND TRIMETHOPRIM 1 TABLET: 400; 80 TABLET ORAL at 08:12

## 2018-12-10 RX ADMIN — HEPARIN SODIUM 1000 UNITS: 1000 INJECTION, SOLUTION INTRAVENOUS; SUBCUTANEOUS at 11:12

## 2018-12-10 RX ADMIN — NYSTATIN 500000 UNITS: 500000 SUSPENSION ORAL at 05:12

## 2018-12-10 RX ADMIN — NYSTATIN 500000 UNITS: 500000 SUSPENSION ORAL at 12:12

## 2018-12-10 RX ADMIN — HEPARIN SODIUM 5000 UNITS: 5000 INJECTION, SOLUTION INTRAVENOUS; SUBCUTANEOUS at 08:12

## 2018-12-10 RX ADMIN — IPRATROPIUM BROMIDE AND ALBUTEROL SULFATE 3 ML: .5; 3 SOLUTION RESPIRATORY (INHALATION) at 08:12

## 2018-12-10 NOTE — PROGRESS NOTES
Patient arrived on unit via stretcher. Unable to obtain patient weight. Dialysis tx initiated via right CVC. Ports aspirated and flushed without difficulty. Lines connected and secured. Orders and machine settings verified. Tx started. VSS.

## 2018-12-10 NOTE — ASSESSMENT & PLAN NOTE
- DEL since transplant.  Pt on Midodrine.  Last HD 11/9/18- 0 fluid removed 2/2 hypotension.  - Nephrology following for HD-  Pt cont to have hypotenstion worse w standing.  Midodrine 10 mg PRN for hypotension during HD ordered.
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
## 2018-12-10 NOTE — PROGRESS NOTES
Plan of care reviewed with pt and pt's family.  Pt received HD this am.  H/H 7.4/24.3 this am-1 unit prbcs ordered and currently infusing.  Pt remains alert and aox4.  Still with complaints of nausea-zofran administered prn.  Repeat CXR done this afternoon.  Will continue to monitor.

## 2018-12-10 NOTE — ASSESSMENT & PLAN NOTE
- DEL for over 2 weeks prior to transplant. Nephrology was consulted.     - Post transplant, Nephrology cont to follow.  He received HD last on 11/30 for metabolic clearance and fluid management.     - Attempted Lasix challenge (160 mg) x1 on 11/28- pt diuresed 105 cc.    - HD 12/3- removed 1.5 liter.  Cont strict I/O's.  Monitor closely.  - Lasix challenge 12/4 with minimal UOP  - Last HD 12/5- 2.3L removed.    - Crackles to mell bases and dependent edema.  Lasix 100mg and albumin x3 12/6/18.  - HD 12/10- 2L removed- tolerated well.

## 2018-12-10 NOTE — ASSESSMENT & PLAN NOTE
- See intra-abdominal abscess.  - wbc improving, cont w delirium.    - Repeat cx 12/4 with NGTD.  Completed Linezolid and Cefepime per ID.

## 2018-12-10 NOTE — PLAN OF CARE
Problem: Adult Inpatient Plan of Care  Goal: Plan of Care Review  Outcome: Ongoing (interventions implemented as appropriate)  No acute events overnight. VSS.   Wife and mother pulling self meds.   Bacitracin ointment applied to old JOSE A site on RLQ. No drainage or odor noted from site.   Anuric. Plan for HD this afternoon.   Needs SNF for rehab per team.   Fall precautions maintained. Bed wheels locked, bed in lowest position, upper SR up x 2, call light in reach, non-skid socks when OOB. Instructed pt to call for assistance as needed. Will cont to monitor.

## 2018-12-10 NOTE — SUBJECTIVE & OBJECTIVE
"Interval History:   Wife reports pt behaving like an "SOB," which she believes is related to anxiety and requests counseling.  Pt states that he does not feel "anxious," but feels very frustrated with a lack of participation in his care.  He expresses that he is appreciative of his wife and mom for all they have done for him, but feels that he is not aware of his plan of care and would like more knowledge about what to expect and anything he can do to help out.    Received trazodone 25 last night.    Family History     None        Tobacco Use    Smoking status: Never Smoker   Substance and Sexual Activity    Alcohol use: Not on file    Drug use: Not on file    Sexual activity: Not on file     Psychotherapeutics (From admission, onward)    Start     Stop Route Frequency Ordered    12/06/18 2100  trazodone split tablet 25 mg      -- Oral Nightly 12/06/18 1009    11/30/18 1233  trazodone split tablet 25 mg      -- Oral Nightly PRN 11/30/18 1150    11/14/18 1118  haloperidol lactate (HALDOL) 5 mg/mL injection     Comments:  Created by cabinet override    11/14 0165   11/14/18 1118           Review of Systems   Constitutional: Negative for fever.   HENT: Negative for hearing loss.    Eyes: Negative for visual disturbance.   Respiratory: Negative for shortness of breath.    Cardiovascular: Negative for chest pain.   Gastrointestinal: Positive for abdominal pain and nausea.   Neurological: Negative for weakness.   Psychiatric/Behavioral: Positive for behavioral problems and sleep disturbance. Negative for confusion, decreased concentration and hallucinations.     Objective:     Vital Signs (Most Recent):  Temp: 98.3 °F (36.8 °C) (12/10/18 0830)  Pulse: 88 (12/10/18 0757)  Resp: (!) 27 (12/10/18 0757)  BP: 112/75 (12/10/18 0757)  SpO2: 97 % (12/10/18 0757) Vital Signs (24h Range):  Temp:  [98.3 °F (36.8 °C)-99.2 °F (37.3 °C)] 98.3 °F (36.8 °C)  Pulse:  [] 88  Resp:  [20-29] 27  SpO2:  [96 %-99 %] 97 %  BP: " "(241-080)/() 112/75     Height: 5' 10" (177.8 cm)  Weight: 75.1 kg (165 lb 9.1 oz)  Body mass index is 23.76 kg/m².      Intake/Output Summary (Last 24 hours) at 12/10/2018 1026  Last data filed at 12/10/2018 0540  Gross per 24 hour   Intake 800 ml   Output 125 ml   Net 675 ml       Physical Exam   Constitutional: He appears well-developed. No distress.   HENT:   Head: Normocephalic.   Cardiovascular: Normal rate and regular rhythm.   Pulmonary/Chest: Effort normal.   Abdominal:   abd incision.  Clean/dry/intact.   Musculoskeletal: He exhibits no edema.             CAM-ICU:  1. Acute change and/or fluctuating course of mental status: No  2. Inattention (SAVEAHAART): No  ? "Squeeze my hand, only when you hear, the letter 'A'."  3. Altered Level of Consciousness: No  4. Disorganized Thinking (Errors >1/6):  No  ? "Will a stone float on water?"  ? "Are there fish in the sea?"  ? "Does one pound weigh more than two?"  ? "Can you use a hammer to pound a nail?"  ? Command(s):  § "Hold up 2 fingers."  § "Now do the same thing with the other hand."       Mental Status Exam:  Appearance: age appropriate, lying in bed  Grooming: appropriate to situation  Arousal: awake.  Behavior/Cooperation: cooperative  Speech: Appropriate  Language: appropriate english vocabulary  Mood: "good"  Affect: normal  Thought Process: normal  Thought Content: appropriate  Associations: no loose associations noted  Orientation: person, place, month/year  Memory: Limited.  Registration 3/3.  Recall 3/3 at 5 minutes.  Fund of Knowledge: Decreased  Attention Span/Concentration:  Unable to complete serial 7s.  Cognition: similarities were concrete  Insight: fair  Judgment: fair     Significant Labs: All pertinent labs within the past 24 hours have been reviewed.       Significant Imaging: I have reviewed all pertinent imaging results/findings within the past 24 hours.        "

## 2018-12-10 NOTE — ASSESSMENT & PLAN NOTE
- Renal function slow to recover post-op.   - Nephrology consulted for management.   - Cont with HD as per nephrology recs.  Apprec recs.    - HD on 11/27 w/ 2L off. Pt tolerated well.    - Lasix 160 mg challenge 11/28 w/ minimal output.    - HD 12/10- 2L removed- tolerated well.   - Strict I&Os.
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
## 2018-12-10 NOTE — PT/OT/SLP PROGRESS
Physical Therapy      Patient Name:  Femi Enciso   MRN:  [id number]    Patient not seen today secondary to Patient out on Dialysis in AM, unable to return in PM.. Will follow-up on next scheduled visit.    Uriel Owens PTA

## 2018-12-10 NOTE — PROGRESS NOTES
[facility]  Nephrology  Progress Note    Patient Name: Femi Enciso  MRN: [id number]  Admission Date: 10/27/2018  Hospital Length of Stay: 44 days  Attending Provider: Femi Patel MD   Primary Care Physician: Primary Doctor No  Principal Problem:S/P liver transplant    Subjective:     HPI: 52 y/o man with DM2 presents to the ED with family for liver failure (likely due to EtOH abundant history of drinking - diagnosed in Sept 2018 in [place]).  He reports jaundice, generalized weakness, nausea, diarrhea, and decreased appetite since Sept 2018.  He is from [place], [place], and Dr. Sharma (patients physician) recommended bringing him to hospital for evaluation.  Patient denies any fever, chills, vomiting, chest pain, palpitations, SOB, abdominal pain.      Nephrology consulted for evaluation/management Adri.     Interval History: Patient progressing well this AM. Patient does endorses complaints of ongoing nausea, but denies complaints of SOB. Assess during HD on RA. Patient currently tolerating HD session with current UFR well, no complications. Target UF 2-2.5 L as tolerated by patient.     Review of patient's allergies indicates:   Allergen Reactions    Penicillins Nausea And Vomiting and Rash     Full body rash from cefepime as well     Current Facility-Administered Medications   Medication Frequency    0.9%  NaCl infusion (for blood administration) Q24H PRN    0.9%  NaCl infusion Once    albuterol-ipratropium 2.5 mg-0.5 mg/3 mL nebulizer solution 3 mL Q4H PRN    albuterol-ipratropium 2.5 mg-0.5 mg/3 mL nebulizer solution 3 mL Q4H WAKE    bacitracin ointment TID    bisacodyl suppository 10 mg Daily PRN    dextrose 50% injection 12.5 g PRN    dextrose 50% injection 25 g PRN    ergocalciferol capsule 50,000 Units Q7 Days    glucagon (human recombinant) injection 1 mg PRN    glucose chewable tablet 16 g PRN    glucose chewable tablet 24 g PRN    heparin (porcine) injection 1,000 Units PRN     heparin (porcine) injection 5,000 Units Q8H    insulin aspart U-100 pen 0-5 Units QID (AC + HS) PRN    isavuconazonium sulfate Cap 372 mg Daily    lidocaine 5 % patch 1 patch Daily    lidocaine HCL 10 mg/ml (1%) injection 1 mL Once    midodrine tablet 10 mg TID PRN    nystatin 100,000 unit/mL suspension 500,000 Units QID (WM & HS)    ondansetron injection 4 mg Q6H PRN    pantoprazole EC tablet 40 mg Daily    predniSONE tablet 5 mg Daily    Followed by    [START ON 12/17/2018] predniSONE tablet 2.5 mg Daily    sodium bicarbonate tablet 650 mg BID    sulfamethoxazole-trimethoprim 400-80mg per tablet 1 tablet Every Mon, Wed, Fri    tacrolimus capsule 3 mg BID    tiZANidine tablet 2 mg Q8H PRN    trazodone split tablet 25 mg Nightly PRN    trazodone split tablet 25 mg QHS    ursodiol capsule 300 mg BID    valganciclovir 50 mg/ml oral solution 100 mg Once per day on Mon Wed Fri       Objective:     Vital Signs (Most Recent):  Temp: 98.3 °F (36.8 °C) (12/10/18 0830)  Pulse: 88 (12/10/18 0757)  Resp: (!) 27 (12/10/18 0757)  BP: 112/75 (12/10/18 0757)  SpO2: 97 % (12/10/18 0757)  O2 Device (Oxygen Therapy): room air (12/10/18 0757) Vital Signs (24h Range):  Temp:  [98.3 °F (36.8 °C)-99.2 °F (37.3 °C)] 98.3 °F (36.8 °C)  Pulse:  [] 88  Resp:  [20-29] 27  SpO2:  [96 %-99 %] 97 %  BP: (110-144)/() 112/75     Weight: 75.1 kg (165 lb 9.1 oz) (12/10/18 0540)  Body mass index is 23.76 kg/m².  Body surface area is 1.93 meters squared.    I/O last 3 completed shifts:  In: 1040 [P.O.:840; I.V.:100; Blood:100]  Out: 125 [Urine:125]    Physical Exam   Constitutional: He is oriented to person, place, and time. He appears well-developed. He is sleeping. He is easily aroused. No distress.   HENT:   Head: Normocephalic and atraumatic.   Neck: Normal range of motion. No JVD present.   Cardiovascular: Normal rate and regular rhythm. Exam reveals no friction rub.   Pulmonary/Chest: Effort normal. He has  decreased breath sounds in the right lower field and the left lower field.   Abdominal: Soft. He exhibits no distension. There is no tenderness.   Musculoskeletal: He exhibits no edema.   Neurological: He is alert, oriented to person, place, and time and easily aroused.   Skin: Skin is warm. He is not diaphoretic.       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   - s/p OHLTx 11/11/2018; intra-op SLED  - remaining oliguric   -hemodynamically stable, off pressors    Plan  -Will provide dialysis today for metabolic clearance and volume management.   -Target UF 2-2.5 L as tolerated by patient.   -Closely follow strict Is & Os and serial RFPs  -Avoid NSAIDs, contrast, nephrotoxins   -Daily weights  -Renally dose medications to current GFR  -Will discuss with staff          Case discussed with staff further recs with attestation.     I will follow-up with patient. Please contact us if you have any additional questions.    TAMELA Rousseau DNP, APRN, FNP-C  Department of Nephrology  [facility]  Pager: [phone]      ATTENDING PHYSICIAN ATTESTATION  I have personally interviewed and examined the patient. I thoroughly reviewed the demographic, clinical, laboratorial and imaging information available in medical records. I agree with the assessment and recommendations provided by the NP  Soham who was under my supervision.     HEMODIALYSIS NOTE  Patient evaluated while undergoing hemodialysis indicated for unresolved DEL. Tolerating session with current UFR, no complications.

## 2018-12-10 NOTE — PLAN OF CARE
Reviewed BG levels and insulin regimen.     Goal BG while inpatient: 140-180 mg/dl  Current BG levels are at goal    No h/o DM  On steroids    Recommendations:   -Low dose correction  -POC AC/HS

## 2018-12-10 NOTE — ASSESSMENT & PLAN NOTE
- c/o increased pain w deep breath today to right side.  CXR showed increased opacity in the inferior hemothorax on the right side since 11/26/2018.  Pulse ox 97-99% on RA.  Cont to monitor    - continues to have fluid to RLL.  WBC remains elevated.    - thoracentesis performed on 11/30. Fluid noted with 4k WBC, 93 SEGS. cx no growth to date.  Cefepime added for treatment.  - Chest CT showing right pleural effusion improved, left w increased pleural effusion.   - Per ID, completed treatment of empyema w Cefepime thru 12/8.

## 2018-12-10 NOTE — SUBJECTIVE & OBJECTIVE
Scheduled Meds:   albuterol-ipratropium  3 mL Nebulization Q4H WAKE    bacitracin   Topical (Top) TID    ergocalciferol  50,000 Units Oral Q7 Days    heparin (porcine)  5,000 Units Subcutaneous Q8H    isavuconazonium sulfate  372 mg Oral Daily    lidocaine  1 patch Transdermal Daily    lidocaine HCL 10 mg/ml (1%)  1 mL Intradermal Once    nystatin  500,000 Units Oral QID (WM & HS)    pantoprazole  40 mg Oral Daily    predniSONE  5 mg Oral Daily    Followed by    [START ON 12/17/2018] predniSONE  2.5 mg Oral Daily    sodium bicarbonate  650 mg Oral BID    sulfamethoxazole-trimethoprim 400-80mg  1 tablet Oral Every Mon, Wed, Fri    tacrolimus  4 mg Oral BID    traZODone  50 mg Oral QHS    ursodiol  300 mg Oral BID    valganciclovir 50 mg/ml  100 mg Oral Once per day on Mon Wed Fri     Continuous Infusions:  PRN Meds:sodium chloride, albuterol-ipratropium, artificial tears, bisacodyl, dextrose 50%, dextrose 50%, glucagon (human recombinant), glucose, glucose, heparin (porcine), insulin aspart U-100, midodrine, ondansetron, ramelteon, tiZANidine, traZODone    Review of Systems   Constitutional: Positive for activity change, appetite change (poor) and fatigue. Negative for fever.   HENT: Negative for facial swelling and voice change.    Eyes: Negative.    Respiratory: Positive for shortness of breath (worse w exertion). Negative for apnea, cough, choking, chest tightness and wheezing.    Cardiovascular: Negative.  Negative for chest pain, palpitations and leg swelling.   Gastrointestinal: Positive for abdominal distention. Negative for abdominal pain, constipation, diarrhea, nausea and vomiting.   Genitourinary: Positive for decreased urine volume. Negative for difficulty urinating, dysuria, hematuria and urgency.   Musculoskeletal: Negative.  Negative for back pain, gait problem, neck pain and neck stiffness.   Skin: Positive for wound. Negative for color change and pallor.   Allergic/Immunologic:  Positive for immunocompromised state.   Neurological: Positive for weakness. Negative for dizziness, seizures, light-headedness and headaches.   Psychiatric/Behavioral: Negative for behavioral problems, confusion, decreased concentration, dysphoric mood, hallucinations, sleep disturbance and suicidal ideas. The patient is not nervous/anxious.      Objective:     Vital Signs (Most Recent):  Temp: 98.4 °F (36.9 °C) (12/10/18 1545)  Pulse: 96 (12/10/18 1545)  Resp: 17 (12/10/18 1545)  BP: 138/89 (12/10/18 1545)  SpO2: 99 % (12/10/18 1545) Vital Signs (24h Range):  Temp:  [98.3 °F (36.8 °C)-99.2 °F (37.3 °C)] 98.4 °F (36.9 °C)  Pulse:  [] 96  Resp:  [15-29] 17  SpO2:  [96 %-99 %] 99 %  BP: (110-150)/() 138/89     Weight: 75.1 kg (165 lb 9.1 oz)  Body mass index is 23.76 kg/m².    Intake/Output - Last 3 Shifts       12/08 0700 - 12/09 0659 12/09 0700 - 12/10 0659 12/10 0700 - 12/11 0659    P.O. 720 600     I.V. (mL/kg) 0 (0) 100 (1.3)     Blood  100     Other 0  600    Total Intake(mL/kg) 720 (9.6) 800 (10.7) 600 (8)    Urine (mL/kg/hr) 0 (0) 125 (0.1)     Emesis/NG output       Other   2600    Stool 0 0     Blood       Total Output 0 125 2600    Net +720 +675 -2000           Urine Occurrence 0 x 0 x     Stool Occurrence 2 x 0 x           Physical Exam   Constitutional: He is oriented to person, place, and time. He appears well-developed. No distress.   HENT:   Head: Normocephalic and atraumatic.   White drainage noted to back of throat and roof on mouth, voice hoarse   Eyes: Pupils are equal, round, and reactive to light. Scleral icterus is present.   Neck: Normal range of motion. Neck supple. No JVD present.   Cardiovascular: Normal rate, regular rhythm and normal heart sounds.   No murmur heard.  Pulmonary/Chest: Effort normal. No respiratory distress. He has decreased breath sounds in the right lower field and the left lower field. He has no wheezes. He exhibits no tenderness.   Taking short breaths, IS  up to 500   Abdominal: Soft. Bowel sounds are normal. He exhibits no distension. There is tenderness.   Chevron inc ECTOR w/ staples  RLQ JOSE A drain and percutaneous drain site w suture CDI.    Musculoskeletal: Normal range of motion. He exhibits edema (1+ BLE ). He exhibits no tenderness.   Neurological: He is alert and oriented to person, place, and time. He has normal reflexes.   Skin: Skin is warm and dry. Capillary refill takes 2 to 3 seconds. He is not diaphoretic. There is pallor.   Psychiatric: His affect is labile. He is slowed. Cognition and memory are impaired.   Nursing note and vitals reviewed.      Laboratory:  Immunosuppressants         Stop Route Frequency     tacrolimus capsule 4 mg      -- Oral 2 times daily        CBC:   Recent Labs   Lab 12/10/18  0631   WBC 8.84   RBC 2.47*   HGB 7.4*   HCT 24.3*      MCV 98   MCH 30.0   MCHC 30.5*     CMP:   Recent Labs   Lab 12/10/18  0631   GLU 88   CALCIUM 8.8   ALBUMIN 2.9*   PROT 5.8*      K 4.1   CO2 22*      BUN 30*   CREATININE 3.2*   ALKPHOS 158*   ALT 15   AST 11   BILITOT 2.0*     Coagulation:   Recent Labs   Lab 12/10/18  0631   INR 1.2     Tacrolimus Lvl   Date Value Ref Range Status   12/10/2018 7.2 5.0 - 15.0 ng/mL Final     Comment:     Testing performed by Liquid Chromatography-Tandem  Mass Spectrometry (LC-MS/MS).  This test was developed and its performance characteristics  determined by [facility], Department of Pathology  and Laboratory Medicine in a manner consistent with CLIA  requirements. It has not been cleared or approved by the [place]  Food and Drug Administration.  This test is used for clinical   purposes.  It should not be regarded as investigational or for  research.     12/09/2018 6.1 5.0 - 15.0 ng/mL Final     Comment:     Testing performed by Liquid Chromatography-Tandem  Mass Spectrometry (LC-MS/MS).  This test was developed and its performance characteristics  determined by [facility],  Department of Pathology  and Laboratory Medicine in a manner consistent with CLIA  requirements. It has not been cleared or approved by the [place]  Food and Drug Administration.  This test is used for clinical   purposes.  It should not be regarded as investigational or for  research.     12/08/2018 3.5 (L) 5.0 - 15.0 ng/mL Final     Comment:     Testing performed by Liquid Chromatography-Tandem  Mass Spectrometry (LC-MS/MS).  This test was developed and its performance characteristics  determined by [facility], Department of Pathology  and Laboratory Medicine in a manner consistent with CLIA  requirements. It has not been cleared or approved by the [place]  Food and Drug Administration.  This test is used for clinical   purposes.  It should not be regarded as investigational or for  research.     12/07/2018 1.9 (L) 5.0 - 15.0 ng/mL Final     Comment:     Testing performed by Liquid Chromatography-Tandem  Mass Spectrometry (LC-MS/MS).  This test was developed and its performance characteristics  determined by [facility], Department of Pathology  and Laboratory Medicine in a manner consistent with CLIA  requirements. It has not been cleared or approved by the [place]  Food and Drug Administration.  This test is used for clinical   purposes.  It should not be regarded as investigational or for  research.           Labs within the past 24 hours have been reviewed.    Diagnostic Results:  None

## 2018-12-10 NOTE — SUBJECTIVE & OBJECTIVE
Interval History: Patient progressing well this AM. Patient does endorses complaints of ongoing nausea, but denies complaints of SOB. Assess during HD on RA. Patient currently tolerating HD session with current UFR well, no complications. Target UF 2-2.5 L as tolerated by patient.     Review of patient's allergies indicates:   Allergen Reactions    Penicillins Nausea And Vomiting and Rash     Full body rash from cefepime as well     Current Facility-Administered Medications   Medication Frequency    0.9%  NaCl infusion (for blood administration) Q24H PRN    0.9%  NaCl infusion Once    albuterol-ipratropium 2.5 mg-0.5 mg/3 mL nebulizer solution 3 mL Q4H PRN    albuterol-ipratropium 2.5 mg-0.5 mg/3 mL nebulizer solution 3 mL Q4H WAKE    bacitracin ointment TID    bisacodyl suppository 10 mg Daily PRN    dextrose 50% injection 12.5 g PRN    dextrose 50% injection 25 g PRN    ergocalciferol capsule 50,000 Units Q7 Days    glucagon (human recombinant) injection 1 mg PRN    glucose chewable tablet 16 g PRN    glucose chewable tablet 24 g PRN    heparin (porcine) injection 1,000 Units PRN    heparin (porcine) injection 5,000 Units Q8H    insulin aspart U-100 pen 0-5 Units QID (AC + HS) PRN    isavuconazonium sulfate Cap 372 mg Daily    lidocaine 5 % patch 1 patch Daily    lidocaine HCL 10 mg/ml (1%) injection 1 mL Once    midodrine tablet 10 mg TID PRN    nystatin 100,000 unit/mL suspension 500,000 Units QID (WM & HS)    ondansetron injection 4 mg Q6H PRN    pantoprazole EC tablet 40 mg Daily    predniSONE tablet 5 mg Daily    Followed by    [START ON 12/17/2018] predniSONE tablet 2.5 mg Daily    sodium bicarbonate tablet 650 mg BID    sulfamethoxazole-trimethoprim 400-80mg per tablet 1 tablet Every Mon, Wed, Fri    tacrolimus capsule 3 mg BID    tiZANidine tablet 2 mg Q8H PRN    trazodone split tablet 25 mg Nightly PRN    trazodone split tablet 25 mg QHS    ursodiol capsule 300 mg BID     valganciclovir 50 mg/ml oral solution 100 mg Once per day on Mon Wed Fri       Objective:     Vital Signs (Most Recent):  Temp: 98.3 °F (36.8 °C) (12/10/18 0830)  Pulse: 88 (12/10/18 0757)  Resp: (!) 27 (12/10/18 0757)  BP: 112/75 (12/10/18 0757)  SpO2: 97 % (12/10/18 0757)  O2 Device (Oxygen Therapy): room air (12/10/18 0757) Vital Signs (24h Range):  Temp:  [98.3 °F (36.8 °C)-99.2 °F (37.3 °C)] 98.3 °F (36.8 °C)  Pulse:  [] 88  Resp:  [20-29] 27  SpO2:  [96 %-99 %] 97 %  BP: (110-144)/() 112/75     Weight: 75.1 kg (165 lb 9.1 oz) (12/10/18 0540)  Body mass index is 23.76 kg/m².  Body surface area is 1.93 meters squared.    I/O last 3 completed shifts:  In: 1040 [P.O.:840; I.V.:100; Blood:100]  Out: 125 [Urine:125]    Physical Exam   Constitutional: He is oriented to person, place, and time. He appears well-developed. He is sleeping. He is easily aroused. No distress.   HENT:   Head: Normocephalic and atraumatic.   Neck: Normal range of motion. No JVD present.   Cardiovascular: Normal rate and regular rhythm. Exam reveals no friction rub.   Pulmonary/Chest: Effort normal. He has decreased breath sounds in the right lower field and the left lower field.   Abdominal: Soft. He exhibits no distension. There is no tenderness.   Musculoskeletal: He exhibits no edema.   Neurological: He is alert, oriented to person, place, and time and easily aroused.   Skin: Skin is warm. He is not diaphoretic.       Significant Labs:  CBC:   Recent Labs   Lab 12/10/18  0631   WBC 8.84   RBC 2.47*   HGB 7.4*   HCT 24.3*      MCV 98   MCH 30.0   MCHC 30.5*     CMP:   Recent Labs   Lab 12/10/18  0631   GLU 88   CALCIUM 8.8   ALBUMIN 2.9*   PROT 5.8*      K 4.1   CO2 22*      BUN 30*   CREATININE 3.2*   ALKPHOS 158*   ALT 15   AST 11   BILITOT 2.0*

## 2018-12-10 NOTE — ASSESSMENT & PLAN NOTE
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   - s/p OHLTx 11/11/2018; intra-op [facility]ED  - remaining oliguric   -hemodynamically stable, off pressors    Plan  -Will provide dialysis today for metabolic clearance and volume management.   -Target UF 2-2.5 L as tolerated by patient.   -Closely follow strict Is & Os and serial RFPs  -Avoid NSAIDs, contrast, nephrotoxins   -Daily weights  -Renally dose medications to current GFR  -Will discuss with staff

## 2018-12-10 NOTE — PROGRESS NOTES
[facility]  Liver Transplant  Progress Note    Patient Name: Femi Enciso  MRN: [id number]  Admission Date: 10/27/2018  Hospital Length of Stay: 44 days  Code Status: Full Code  Primary Care Provider: Primary Doctor No  Post-Operative Day: 29    ORGAN:   LIVER  Disease Etiology: Alcoholic Cirrhosis  Donor Type:    - Brain Death  CDC High Risk:   No  Donor CMV Status:   Donor CMV Status: Positive  Donor HBcAB:   Negative  Donor HCV Status:   Negative  Donor HBV JAVIER: Negative  Donor HCV JAVIER: Negative  Whole or Partial: Whole Liver  Biliary Anastomosis: End to End  Arterial Anatomy: Standard  Subjective:     History of Present Illness:  Femi Enciso is a 53yr old male with PMH of acute ETOH hepatitis and DEL/ATN evolved to ESRD requiring HD (not candidate for KTX). He is now s/p liver transplant (SM induction, DBD, CMV D+/R-). Transplant surgery noted severe collateralization, adrenal gland firmly adhered to liver. Bare area of adrenal gland required several stitches and packing to obtain fair hemostasis (EBL 15L). OR take back  for bleeding from R adrenal bed in AM (significant clot in retroperitoneum, posterior to R hepatic lobe, tract posterior to the R kidney containing significant clot, small bleeding area of retroperitoneal fat) then returned to surgery same day for hemorrhaging closed with wound vac with plans to return to OR 24-48 hours for closure (retroperitoneal /retrorenal/retrocolic hematoma on the right ). Raw retroperitoneal bleeding. Suture ligatures placed, argon beam cautery, evarest placed in retroperitoneum behind cava and right kidney. Significant oozing from upper right adrenal gland, not amenable to ligation, that portion of the adrenal gland was resected with cautery). OR take back  for washout and incisional closure, no significant bleeding or hematoma found. OR cultures   from body fluid grew enterococcus faecium VRE. ID was consulted,  started on  daptomycin for VRE treatment and cefepime/flagyl to cover for IA ty in bile. Patient with significant leukocytosis with peak on 11/22 at 48K prompting drainage of R subphrenic fluid collection, perc drain placed, culture grew VRE. Stroke code called 11/21 for lethargy and unresponsive, CT head without evidence of acute ischemic changes and CTA chest negative, vascular neurology was consulted recommended MRI brain, but patient's AMS improved shortly after event. Nephrology consulted for ESRD requiring HD. Patient overall improved on antibiotic regimen, leukocytosis and AMS improved. He was transferred to [facility] on POD #15.     Hospital Course:  Interval History:  No acute events overnight. Mental status back to baseline.  C/o intermittent nausea.  Changed from Pepcid to Protonix over the weekend.  Cont anti-emetics. Appetite is poor.  Encourage supplements and small meals multiple times per day. He notes left side pain when he turns on his left side.  Pulse ox stable.  CXR ok.  He also notes episode of right groin pain w turning, resolved w/o intervention w/in 3-4 min.  HD today- 2L removed. Tolerated well.  LFTs at this time wnl.  T bili trending down.  T bili going into transplant 37.  Per family, he was sleeping better Trazodone 50 mg.  Since HE resolved, Psych feesl ok to increase dose back to 50 mg.  Of note, pt had been on lexapro 20 mg.  Lexapro was d/c'd on transfer to [facility].  Psych would like to wait to restart.  Back pain managed w Zanaflex and Lidocaine as needed.  Transfused 1 u PRBC today. Stool for occult blood neg.  LD 12/3 was 246, Haptoglobin 12/3 was 229.  WBC remains normalized.  Blood cx 12/4 NGTD, urine cx 12/4 NGTD.  Cefepime completed 12/8/18.  Linezolid for VRE completed 12/09/18.    Pt remains afebrile. Continues to improve physically but slowly, will need SNF consult for placement.   Monitor.         Scheduled Meds:   albuterol-ipratropium  3 mL Nebulization Q4H WAKE    bacitracin    Topical (Top) TID    ergocalciferol  50,000 Units Oral Q7 Days    heparin (porcine)  5,000 Units Subcutaneous Q8H    isavuconazonium sulfate  372 mg Oral Daily    lidocaine  1 patch Transdermal Daily    lidocaine HCL 10 mg/ml (1%)  1 mL Intradermal Once    nystatin  500,000 Units Oral QID (WM & HS)    pantoprazole  40 mg Oral Daily    predniSONE  5 mg Oral Daily    Followed by    [START ON 12/17/2018] predniSONE  2.5 mg Oral Daily    sodium bicarbonate  650 mg Oral BID    sulfamethoxazole-trimethoprim 400-80mg  1 tablet Oral Every Mon, Wed, Fri    tacrolimus  4 mg Oral BID    traZODone  50 mg Oral QHS    ursodiol  300 mg Oral BID    valganciclovir 50 mg/ml  100 mg Oral Once per day on Mon Wed Fri     Continuous Infusions:  PRN Meds:sodium chloride, albuterol-ipratropium, artificial tears, bisacodyl, dextrose 50%, dextrose 50%, glucagon (human recombinant), glucose, glucose, heparin (porcine), insulin aspart U-100, midodrine, ondansetron, ramelteon, tiZANidine, traZODone    Review of Systems   Constitutional: Positive for activity change, appetite change (poor) and fatigue. Negative for fever.   HENT: Negative for facial swelling and voice change.    Eyes: Negative.    Respiratory: Positive for shortness of breath (worse w exertion). Negative for apnea, cough, choking, chest tightness and wheezing.    Cardiovascular: Negative.  Negative for chest pain, palpitations and leg swelling.   Gastrointestinal: Positive for abdominal distention. Negative for abdominal pain, constipation, diarrhea, nausea and vomiting.   Genitourinary: Positive for decreased urine volume. Negative for difficulty urinating, dysuria, hematuria and urgency.   Musculoskeletal: Negative.  Negative for back pain, gait problem, neck pain and neck stiffness.   Skin: Positive for wound. Negative for color change and pallor.   Allergic/Immunologic: Positive for immunocompromised state.   Neurological: Positive for weakness. Negative for  dizziness, seizures, light-headedness and headaches.   Psychiatric/Behavioral: Negative for behavioral problems, confusion, decreased concentration, dysphoric mood, hallucinations, sleep disturbance and suicidal ideas. The patient is not nervous/anxious.      Objective:     Vital Signs (Most Recent):  Temp: 98.4 °F (36.9 °C) (12/10/18 1545)  Pulse: 96 (12/10/18 1545)  Resp: 17 (12/10/18 1545)  BP: 138/89 (12/10/18 1545)  SpO2: 99 % (12/10/18 1545) Vital Signs (24h Range):  Temp:  [98.3 °F (36.8 °C)-99.2 °F (37.3 °C)] 98.4 °F (36.9 °C)  Pulse:  [] 96  Resp:  [15-29] 17  SpO2:  [96 %-99 %] 99 %  BP: (110-150)/() 138/89     Weight: 75.1 kg (165 lb 9.1 oz)  Body mass index is 23.76 kg/m².    Intake/Output - Last 3 Shifts       12/08 0700 - 12/09 0659 12/09 0700 - 12/10 0659 12/10 0700 - 12/11 0659    P.O. 720 600     I.V. (mL/kg) 0 (0) 100 (1.3)     Blood  100     Other 0  600    Total Intake(mL/kg) 720 (9.6) 800 (10.7) 600 (8)    Urine (mL/kg/hr) 0 (0) 125 (0.1)     Emesis/NG output       Other   2600    Stool 0 0     Blood       Total Output 0 125 2600    Net +720 +675 -2000           Urine Occurrence 0 x 0 x     Stool Occurrence 2 x 0 x           Physical Exam   Constitutional: He is oriented to person, place, and time. He appears well-developed. No distress.   HENT:   Head: Normocephalic and atraumatic.   White drainage noted to back of throat and roof on mouth, voice hoarse   Eyes: Pupils are equal, round, and reactive to light. Scleral icterus is present.   Neck: Normal range of motion. Neck supple. No JVD present.   Cardiovascular: Normal rate, regular rhythm and normal heart sounds.   No murmur heard.  Pulmonary/Chest: Effort normal. No respiratory distress. He has decreased breath sounds in the right lower field and the left lower field. He has no wheezes. He exhibits no tenderness.   Taking short breaths, IS up to 500   Abdominal: Soft. Bowel sounds are normal. He exhibits no distension. There is  tenderness.   Chevron inc ECTOR w/ staples  RLQ JOSE A drain and percutaneous drain site w suture CDI.    Musculoskeletal: Normal range of motion. He exhibits edema (1+ BLE ). He exhibits no tenderness.   Neurological: He is alert and oriented to person, place, and time. He has normal reflexes.   Skin: Skin is warm and dry. Capillary refill takes 2 to 3 seconds. He is not diaphoretic. There is pallor.   Psychiatric: His affect is labile. He is slowed. Cognition and memory are impaired.   Nursing note and vitals reviewed.      Laboratory:  Immunosuppressants         Stop Route Frequency     tacrolimus capsule 4 mg      -- Oral 2 times daily        CBC:   Recent Labs   Lab 12/10/18  0631   WBC 8.84   RBC 2.47*   HGB 7.4*   HCT 24.3*      MCV 98   MCH 30.0   MCHC 30.5*     CMP:   Recent Labs   Lab 12/10/18  0631   GLU 88   CALCIUM 8.8   ALBUMIN 2.9*   PROT 5.8*      K 4.1   CO2 22*      BUN 30*   CREATININE 3.2*   ALKPHOS 158*   ALT 15   AST 11   BILITOT 2.0*     Coagulation:   Recent Labs   Lab 12/10/18  0631   INR 1.2     Tacrolimus Lvl   Date Value Ref Range Status   12/10/2018 7.2 5.0 - 15.0 ng/mL Final     Comment:     Testing performed by Liquid Chromatography-Tandem  Mass Spectrometry (LC-MS/MS).  This test was developed and its performance characteristics  determined by [facility], Department of Pathology  and Laboratory Medicine in a manner consistent with CLIA  requirements. It has not been cleared or approved by the [place]  Food and Drug Administration.  This test is used for clinical   purposes.  It should not be regarded as investigational or for  research.     12/09/2018 6.1 5.0 - 15.0 ng/mL Final     Comment:     Testing performed by Liquid Chromatography-Tandem  Mass Spectrometry (LC-MS/MS).  This test was developed and its performance characteristics  determined by [facility], Department of Pathology  and Laboratory Medicine in a manner consistent with  CLIA  requirements. It has not been cleared or approved by the [place]  Food and Drug Administration.  This test is used for clinical   purposes.  It should not be regarded as investigational or for  research.     12/08/2018 3.5 (L) 5.0 - 15.0 ng/mL Final     Comment:     Testing performed by Liquid Chromatography-Tandem  Mass Spectrometry (LC-MS/MS).  This test was developed and its performance characteristics  determined by [facility], Department of Pathology  and Laboratory Medicine in a manner consistent with CLIA  requirements. It has not been cleared or approved by the [place]  Food and Drug Administration.  This test is used for clinical   purposes.  It should not be regarded as investigational or for  research.     12/07/2018 1.9 (L) 5.0 - 15.0 ng/mL Final     Comment:     Testing performed by Liquid Chromatography-Tandem  Mass Spectrometry (LC-MS/MS).  This test was developed and its performance characteristics  determined by [facility], Department of Pathology  and Laboratory Medicine in a manner consistent with CLIA  requirements. It has not been cleared or approved by the [place]  Food and Drug Administration.  This test is used for clinical   purposes.  It should not be regarded as investigational or for  research.           Labs within the past 24 hours have been reviewed.    Diagnostic Results:  None    Assessment/Plan:     * S/P liver transplant    - 53 year old male with history of ESLD 2/2 ETOH cirrhosis who is s/p liver transplant c/b take-back x 3 for bleeding most recently 11/18.  - LFTs now improving slowly.  T bili was 37.6 day of transplant.  - Pt remains with significant debility. Pt will need SNF placement when medically stable.   - Appetite slowly improving.  Tolerating supplements.  Encourage PO intake.   - Drain placed during take back. Drain placed to intra-abdominal abscess on 11/22.   - Fluid from collection noted with enterococcus VRE. Cont with antibxs per ID.  De escalated  to PO on 11/29/18.    - Abdominal pain well controlled, and having BMs.    - HD per nephrology.   - Muscle relaxer started and will hold off on oxycodone for now.   - LFTs remain stable.  T bili 2. was 37 prior to txp.         Delirium    - Pt with intermittent confusion since transplant was improving slowly.   - Pt with some lethargy and confusion on 11/21. Head CT negative.   - AAOx3. More alert and awake on 11/27.   - AAOx4 on 11/29.  He was happy he could remember what day it is today.    - confusion worse over the wkd.  Seroquel d/c'd 11/30/18.  Trazodone for insomnia ordered w PRN dose.    - re consulted psych, awaiting recommendations.   - delirium could be d/t rising prograf.  AMS improved since holding Prograf.    - CT head 12/4 with no acute findings.   - Restarted Prograf 12/6- will need to monitor mental status closely.     Moderate malnutrition    - muscle wasting noted.  Appetite on and off.  Cont supplements.  Dietician following.  Apprec recs.    - encourage multiple meals.     Acute renal failure with acute tubular necrosis superimposed on stage 3 chronic kidney disease    - Renal function slow to recover post-op.   - Nephrology consulted for management.   - Cont with HD as per nephrology recs.  Apprec recs.    - HD on 11/27 w/ 2L off. Pt tolerated well.    - Lasix 160 mg challenge 11/28 w/ minimal output.    - HD 12/10- 2L removed- tolerated well.   - Strict I&Os.      Intra-abdominal abscess    - Significant increase in WBC post-op.   - OR cultures from 11/18 noted with enterococcus VRE.   - Abdominal CT performed at that time with fluid collection and drain placed on 11/22 for drainage.   - Intra-abdominal abscess culture also with enterococcus VRE.   - ID consulted and pt placed on antibxs for treatment. Pt was on Cefepime, Daptomycin, and Fluconazole for treatment.    - Pt remains with RLQ drains (one JOSE A and one Percutaneous).  One JOSE A removed 11/28.  Perc drain in placed draining tan, clear  drainage.  WBC slowly improving.   - Liver US on 11/26 reviewed.   - Abdominal CT performed 11/27 and reviewed. Fluid collection noted with improvement on CT.  ID following and reassured by findings.    - Dapto, Cefepime and Flagyl changed 11/30/18 to Linezolid 600 mg PO q 12 hr x 10 days per ID.     - added back cefepime after thora.  ID re consulted.  -Plan to complete Zyvox x 10 days per ID thru 12/9/18. Monitor.      Long-term use of immunosuppressant medication    - Cont with prograf. Draw prograf level daily and adjust dose as needed to maintain a therapeutic level.        At risk for opportunistic infections    - Cont with bactrim and valcyte for protection against opportunistic infections.   - cont Isavuconazonium.     Leucocytosis    - See intra-abdominal abscess.  - wbc improving, cont w delirium.    - Repeat cx 12/4 with NGTD.  Completed Linezolid and Cefepime per ID.         Prophylactic immunotherapy    - See long term immunosuppression.        Weakness    - Pt remains significantly debilitated.   - PT/OT for strengthening.   - Currently will need SNF for placement and further rehabilitation.  Insurance does not cover Rehab.       Pleural effusion associated with hepatic disorder    - c/o increased pain w deep breath today to right side.  CXR showed increased opacity in the inferior hemothorax on the right side since 11/26/2018.  Pulse ox 97-99% on RA.  Cont to monitor    - continues to have fluid to RLL.  WBC remains elevated.    - thoracentesis performed on 11/30. Fluid noted with 4k WBC, 93 SEGS. cx no growth to date.  Cefepime added for treatment.  - Chest CT showing right pleural effusion improved, left w increased pleural effusion.   - Per ID, completed treatment of empyema w Cefepime thru 12/8.     Type 2 diabetes mellitus without complication    - Endocrine consulted for management. Appreciate recs.        Anemia of chronic disease    - H&H trending down. Transfused 1 unit of PRBC with HD- .  Monitor with daily labs.   - Chest/Abd/pelvis CT 12/4- no fluid to be drainged per transplant surgeon.  No source of bleeding.     - last liver US 11/27. Evolving peritransplant hematomas with anechoic fluid collection adjacent to the right hepatic lobe.  Small volume perihepatic free fluid.     DEL (acute kidney injury)    - DEL for over 2 weeks prior to transplant. Nephrology was consulted.     - Post transplant, Nephrology cont to follow.  He received HD last on 11/30 for metabolic clearance and fluid management.     - Attempted Lasix challenge (160 mg) x1 on 11/28- pt diuresed 105 cc.    - HD 12/3- removed 1.5 liter.  Cont strict I/O's.  Monitor closely.  - Lasix challenge 12/4 with minimal UOP  - Last HD 12/5- 2.3L removed.    - Crackles to mell bases and dependent edema.  Lasix 100mg and albumin x3 12/6/18.  - HD 12/10- 2L removed- tolerated well.     Acute renal failure    - DEL since transplant.  Pt on Midodrine.  Last HD 11/9/18- 0 fluid removed 2/2 hypotension.  - Nephrology following for HD-  Pt cont to have hypotenstion worse w standing.  Midodrine 10 mg PRN for hypotension during HD ordered.           VTE Risk Mitigation (From admission, onward)        Ordered     heparin (porcine) injection 5,000 Units  Every 8 hours      11/30/18 1149     heparin (porcine) injection 1,000 Units  As needed (PRN)      11/25/18 1428     IP VTE HIGH RISK PATIENT  Once      11/11/18 1208          The patients clinical status was discussed at multidisplinary rounds, involving transplant surgery, transplant medicine, pharmacy, nursing, nutrition, and social work    Discharge Planning:  No plan for discharge.    Hilary Galarza NP  Liver Transplant  [facility]

## 2018-12-10 NOTE — ASSESSMENT & PLAN NOTE
- muscle wasting noted.  Appetite on and off.  Cont supplements.  Dietician following.  Apprec recs.    - encourage multiple meals.

## 2018-12-10 NOTE — ASSESSMENT & PLAN NOTE
- 53 year old male with history of ESLD 2/2 ETOH cirrhosis who is s/p liver transplant c/b take-back x 3 for bleeding most recently 11/18.  - LFTs now improving slowly.  T bili was 37.6 day of transplant.  - Pt remains with significant debility. Pt will need SNF placement when medically stable.   - Appetite slowly improving.  Tolerating supplements.  Encourage PO intake.   - Drain placed during take back. Drain placed to intra-abdominal abscess on 11/22.   - Fluid from collection noted with enterococcus VRE. Cont with antibxs per ID.  De escalated to PO on 11/29/18.    - Abdominal pain well controlled, and having BMs.    - HD per nephrology.   - Muscle relaxer started and will hold off on oxycodone for now.   - LFTs remain stable.  T bili 2. was 37 prior to txp.

## 2018-12-11 PROBLEM — R11.0 NAUSEA: Status: ACTIVE | Noted: 2018-12-11

## 2018-12-11 LAB
ALBUMIN SERPL BCP-MCNC: 2.5 G/DL
ALP SERPL-CCNC: 162 U/L
ALT SERPL W/O P-5'-P-CCNC: 16 U/L
ANION GAP SERPL CALC-SCNC: 8 MMOL/L
AST SERPL-CCNC: 13 U/L
BACTERIA #/AREA URNS AUTO: ABNORMAL /HPF
BASOPHILS # BLD AUTO: 0.11 K/UL
BASOPHILS NFR BLD: 1 %
BILIRUB SERPL-MCNC: 2.1 MG/DL
BILIRUB UR QL STRIP: ABNORMAL
BUN SERPL-MCNC: 23 MG/DL
CALCIUM SERPL-MCNC: 8.4 MG/DL
CHLORIDE SERPL-SCNC: 107 MMOL/L
CLARITY UR REFRACT.AUTO: ABNORMAL
CO2 SERPL-SCNC: 24 MMOL/L
COLOR UR AUTO: ABNORMAL
CREAT SERPL-MCNC: 2.4 MG/DL
DIFFERENTIAL METHOD: ABNORMAL
EOSINOPHIL # BLD AUTO: 0.1 K/UL
EOSINOPHIL NFR BLD: 0.6 %
ERYTHROCYTE [DISTWIDTH] IN BLOOD BY AUTOMATED COUNT: 16 %
EST. GFR  (AFRICAN AMERICAN): 34.3 ML/MIN/1.73 M^2
EST. GFR  (NON AFRICAN AMERICAN): 29.7 ML/MIN/1.73 M^2
FUNGUS SPEC CULT: NORMAL
GLUCOSE SERPL-MCNC: 113 MG/DL
GLUCOSE UR QL STRIP: NEGATIVE
HCT VFR BLD AUTO: 28.9 %
HGB BLD-MCNC: 9.1 G/DL
HGB UR QL STRIP: ABNORMAL
HYALINE CASTS UR QL AUTO: 22 /LPF
IMM GRANULOCYTES # BLD AUTO: 0.05 K/UL
IMM GRANULOCYTES NFR BLD AUTO: 0.5 %
INR PPP: 1.2
KETONES UR QL STRIP: NEGATIVE
LEUKOCYTE ESTERASE UR QL STRIP: ABNORMAL
LYMPHOCYTES # BLD AUTO: 0.4 K/UL
LYMPHOCYTES NFR BLD: 4.1 %
MAGNESIUM SERPL-MCNC: 1.8 MG/DL
MCH RBC QN AUTO: 29.1 PG
MCHC RBC AUTO-ENTMCNC: 31.5 G/DL
MCV RBC AUTO: 92 FL
MICROSCOPIC COMMENT: ABNORMAL
MONOCYTES # BLD AUTO: 1 K/UL
MONOCYTES NFR BLD: 9.2 %
NEUTROPHILS # BLD AUTO: 8.9 K/UL
NEUTROPHILS NFR BLD: 84.6 %
NITRITE UR QL STRIP: NEGATIVE
NRBC BLD-RTO: 0 /100 WBC
PH UR STRIP: 5 [PH] (ref 5–8)
PHOSPHATE SERPL-MCNC: 2.9 MG/DL
PLATELET # BLD AUTO: 221 K/UL
PMV BLD AUTO: 11 FL
POCT GLUCOSE: 101 MG/DL (ref 70–110)
POCT GLUCOSE: 102 MG/DL (ref 70–110)
POCT GLUCOSE: 106 MG/DL (ref 70–110)
POCT GLUCOSE: 113 MG/DL (ref 70–110)
POCT GLUCOSE: 130 MG/DL (ref 70–110)
POTASSIUM SERPL-SCNC: 4 MMOL/L
PROT SERPL-MCNC: 5.6 G/DL
PROT UR QL STRIP: ABNORMAL
PROTHROMBIN TIME: 12.3 SEC
RBC # BLD AUTO: 3.13 M/UL
RBC #/AREA URNS AUTO: 19 /HPF (ref 0–4)
SODIUM SERPL-SCNC: 139 MMOL/L
SP GR UR STRIP: >=1.03 (ref 1–1.03)
SQUAMOUS #/AREA URNS AUTO: 0 /HPF
TACROLIMUS BLD-MCNC: 13.8 NG/ML
URN SPEC COLLECT METH UR: ABNORMAL
WBC # BLD AUTO: 10.51 K/UL
WBC #/AREA URNS AUTO: 15 /HPF (ref 0–5)

## 2018-12-11 PROCEDURE — 99900035 HC TECH TIME PER 15 MIN (STAT)

## 2018-12-11 PROCEDURE — 94640 AIRWAY INHALATION TREATMENT: CPT

## 2018-12-11 PROCEDURE — 94761 N-INVAS EAR/PLS OXIMETRY MLT: CPT

## 2018-12-11 PROCEDURE — 97530 THERAPEUTIC ACTIVITIES: CPT

## 2018-12-11 PROCEDURE — 94664 DEMO&/EVAL PT USE INHALER: CPT

## 2018-12-11 PROCEDURE — 80197 ASSAY OF TACROLIMUS: CPT

## 2018-12-11 PROCEDURE — 63600175 PHARM REV CODE 636 W HCPCS: Performed by: NURSE PRACTITIONER

## 2018-12-11 PROCEDURE — 25000003 PHARM REV CODE 250: Performed by: STUDENT IN AN ORGANIZED HEALTH CARE EDUCATION/TRAINING PROGRAM

## 2018-12-11 PROCEDURE — 97112 NEUROMUSCULAR REEDUCATION: CPT

## 2018-12-11 PROCEDURE — 85025 COMPLETE CBC W/AUTO DIFF WBC: CPT

## 2018-12-11 PROCEDURE — 20600001 HC STEP DOWN PRIVATE ROOM

## 2018-12-11 PROCEDURE — 25000242 PHARM REV CODE 250 ALT 637 W/ HCPCS: Performed by: NURSE PRACTITIONER

## 2018-12-11 PROCEDURE — 27000646 HC AEROBIKA DEVICE

## 2018-12-11 PROCEDURE — 99233 SBSQ HOSP IP/OBS HIGH 50: CPT | Mod: 24,,, | Performed by: NURSE PRACTITIONER

## 2018-12-11 PROCEDURE — 25000003 PHARM REV CODE 250: Performed by: NURSE PRACTITIONER

## 2018-12-11 PROCEDURE — 99233 PR SUBSEQUENT HOSPITAL CARE,LEVL III: ICD-10-PCS | Mod: 24,,, | Performed by: NURSE PRACTITIONER

## 2018-12-11 PROCEDURE — 83735 ASSAY OF MAGNESIUM: CPT

## 2018-12-11 PROCEDURE — 80053 COMPREHEN METABOLIC PANEL: CPT

## 2018-12-11 PROCEDURE — 87040 BLOOD CULTURE FOR BACTERIA: CPT | Mod: 59

## 2018-12-11 PROCEDURE — 63600175 PHARM REV CODE 636 W HCPCS: Performed by: STUDENT IN AN ORGANIZED HEALTH CARE EDUCATION/TRAINING PROGRAM

## 2018-12-11 PROCEDURE — 85610 PROTHROMBIN TIME: CPT

## 2018-12-11 PROCEDURE — 81001 URINALYSIS AUTO W/SCOPE: CPT

## 2018-12-11 PROCEDURE — 36415 COLL VENOUS BLD VENIPUNCTURE: CPT

## 2018-12-11 PROCEDURE — 87086 URINE CULTURE/COLONY COUNT: CPT

## 2018-12-11 PROCEDURE — 84100 ASSAY OF PHOSPHORUS: CPT

## 2018-12-11 RX ORDER — HYDROCODONE BITARTRATE AND ACETAMINOPHEN 500; 5 MG/1; MG/1
TABLET ORAL
Status: DISCONTINUED | OUTPATIENT
Start: 2018-12-11 | End: 2018-12-11

## 2018-12-11 RX ORDER — TACROLIMUS 1 MG/1
3 CAPSULE ORAL 2 TIMES DAILY
Status: DISCONTINUED | OUTPATIENT
Start: 2018-12-11 | End: 2018-12-12

## 2018-12-11 RX ADMIN — ISAVUCONAZONIUM SULFATE 372 MG: 186 CAPSULE ORAL at 08:12

## 2018-12-11 RX ADMIN — SODIUM BICARBONATE 650 MG TABLET 650 MG: at 05:12

## 2018-12-11 RX ADMIN — URSODIOL 300 MG: 300 CAPSULE ORAL at 08:12

## 2018-12-11 RX ADMIN — BACITRACIN: 500 OINTMENT TOPICAL at 02:12

## 2018-12-11 RX ADMIN — PREDNISONE 5 MG: 5 TABLET ORAL at 08:12

## 2018-12-11 RX ADMIN — HEPARIN SODIUM 5000 UNITS: 5000 INJECTION, SOLUTION INTRAVENOUS; SUBCUTANEOUS at 08:12

## 2018-12-11 RX ADMIN — NYSTATIN 500000 UNITS: 500000 SUSPENSION ORAL at 08:12

## 2018-12-11 RX ADMIN — HEPARIN SODIUM 5000 UNITS: 5000 INJECTION, SOLUTION INTRAVENOUS; SUBCUTANEOUS at 02:12

## 2018-12-11 RX ADMIN — TIZANIDINE 2 MG: 2 TABLET ORAL at 08:12

## 2018-12-11 RX ADMIN — NYSTATIN 500000 UNITS: 500000 SUSPENSION ORAL at 12:12

## 2018-12-11 RX ADMIN — SODIUM BICARBONATE 650 MG TABLET 650 MG: at 08:12

## 2018-12-11 RX ADMIN — IPRATROPIUM BROMIDE AND ALBUTEROL SULFATE 3 ML: .5; 3 SOLUTION RESPIRATORY (INHALATION) at 12:12

## 2018-12-11 RX ADMIN — TACROLIMUS 3 MG: 1 CAPSULE ORAL at 05:12

## 2018-12-11 RX ADMIN — BACITRACIN: 500 OINTMENT TOPICAL at 08:12

## 2018-12-11 RX ADMIN — IPRATROPIUM BROMIDE AND ALBUTEROL SULFATE 3 ML: .5; 3 SOLUTION RESPIRATORY (INHALATION) at 08:12

## 2018-12-11 RX ADMIN — TRAZODONE HYDROCHLORIDE 50 MG: 50 TABLET ORAL at 08:12

## 2018-12-11 RX ADMIN — HEPARIN SODIUM 5000 UNITS: 5000 INJECTION, SOLUTION INTRAVENOUS; SUBCUTANEOUS at 05:12

## 2018-12-11 RX ADMIN — NYSTATIN 500000 UNITS: 500000 SUSPENSION ORAL at 05:12

## 2018-12-11 RX ADMIN — PANTOPRAZOLE SODIUM 40 MG: 40 TABLET, DELAYED RELEASE ORAL at 08:12

## 2018-12-11 RX ADMIN — LIDOCAINE 1 PATCH: 50 PATCH TOPICAL at 09:12

## 2018-12-11 RX ADMIN — IPRATROPIUM BROMIDE AND ALBUTEROL SULFATE 3 ML: .5; 3 SOLUTION RESPIRATORY (INHALATION) at 07:12

## 2018-12-11 RX ADMIN — TACROLIMUS 4 MG: 1 CAPSULE ORAL at 08:12

## 2018-12-11 RX ADMIN — IPRATROPIUM BROMIDE AND ALBUTEROL SULFATE 3 ML: .5; 3 SOLUTION RESPIRATORY (INHALATION) at 03:12

## 2018-12-11 NOTE — PLAN OF CARE
Problem: Patient Care Overview  Goal: Plan of Care Review  Outcome: Ongoing (interventions implemented as appropriate)  Pt AAOx4, VSS, in bed with upper siderails raised x2, bed in lowest/locked position, call light/personal belongings within reach. Pt instructed to call for assistance. Pt verbalizes understanding. Pt afebrile at this time.  Proper hand hygiene performed before and after pt care.      HD (12/10) with 2L off.  CXR (12/10), see results.   1u PRBC administered (12/10). Will recheck H&H with AM labs.  Bacitracin applied to old JOSE A site.  Chiara BARNEY with ss intact.  Monitoring BG ACHS. Bedtime BG 81.   PRN Zanaflex administered.  No n/v so far this shift.  Mother at bedside attentive to needs.  UOP measured via urinal. No bm so far this shift.    Will continue to monitor patient.

## 2018-12-11 NOTE — ASSESSMENT & PLAN NOTE
- muscle wasting noted.  Appetite on and off.  Cont supplements.  Dietician following.  Apprec recs.    - encourage multiple, small meals.

## 2018-12-11 NOTE — PLAN OF CARE
Reviewed BG levels and insulin regimen.     Goal BG while inpatient: 140-180 mg/dl  Current BG levels are at goal. Pt had one episode of hypoglycemia yesterday.    No h/o DM  On Prednisone 5 mg daily    Recommendations:   -Low dose correction  -POC AC/HS      Endocrinology will sign off. Please re-consult if there are any further questions/ concerns.

## 2018-12-11 NOTE — ASSESSMENT & PLAN NOTE
- Significant increase in WBC post-op.   - OR cultures from 11/18 noted with enterococcus VRE.   - Abdominal CT performed at that time with fluid collection and drain placed on 11/22 for drainage.   - Intra-abdominal abscess culture also with enterococcus VRE.   - ID consulted and pt placed on antibxs for treatment. Pt was on Cefepime, Daptomycin, and Fluconazole for treatment.    - Pt remains with RLQ drains (one JOSE A and one Percutaneous).  One JOSE A removed 11/28.  Perc drain in placed draining tan, clear drainage.  WBC slowly improving.   - Liver US on 11/26 reviewed.   - Abdominal CT performed 11/27 and reviewed. Fluid collection noted with improvement on CT.  ID following and reassured by findings.    - Dapto, Cefepime and Flagyl changed 11/30/18 to Linezolid 600 mg PO q 12 hr x 10 days per ID.     - added back cefepime after thora.  ID re consulted.  -Completed Zyvox x 10 days per ID thru 12/9/18. Monitor.

## 2018-12-11 NOTE — PT/OT/SLP PROGRESS
Physical Therapy Treatment    Patient Name:  Femi Enciso   MRN:  [id number]    Recommendations:     Discharge Recommendations:  nursing facility, skilled   Discharge Equipment Recommendations: (TBD)   Barriers to discharge: Decreased caregiver support at current functional level    Assessment:     Femi Enciso is a 53 y.o. male admitted with a medical diagnosis of S/P liver transplant.  He presents with the following impairments/functional limitations:  weakness, gait instability, impaired endurance, impaired balance, decreased upper extremity function, decreased lower extremity function, impaired self care skills, impaired functional mobilty, pain, decreased safety awareness. Pt progressing functional mobility, as he was able to increase standing tolerance this date. However, pt continues to require assist of 2 to achieve and maintain full upright standing. Remains unable to progress to gait training 2* LE weakness and balance impairments. Pt would continue to benefit from skilled acute PT in order to address current deficits and progress functional mobility.     Rehab Prognosis:  good; patient would benefit from acute skilled PT services to address these deficits and reach maximum level of function.      Recent Surgery: Procedure(s) (LRB):  LAPAROTOMY, EXPLORATORY, AFTER LIVER TRANSPLANT, LIKELY  ABDOMINAL CLOSURE (N/A) 23 Days Post-Op    Plan:     During this hospitalization, patient to be seen 4 x/week to address the above listed problems via gait training, therapeutic activities, therapeutic exercises, neuromuscular re-education  · Plan of Care Expires:  12/19/18   Plan of Care Reviewed with: patient, mother, spouse    Subjective     Communicated with RN prior to session.  Patient found [facility] upon PT entry to room, agreeable to treatment.      Chief Complaint: abdominal pain   Pain/Comfort:  · Pain Rating 1: 5/10  · Location - Side 1: Bilateral  · Location 1: abdomen  · Pain Addressed 1: Reposition,  Distraction    Patients cultural, spiritual, Restoration conflicts given the current situation: none noted     Objective:     Patient found with: telemetry, pulse ox (continuous)     General Precautions: Standard, fall   Orthopedic Precautions:N/A   Braces: N/A     Functional Mobility:  · Transfers:     · Sit to Stand:  maximal assistance and of 2 persons with no AD x2 reps and with RW x1 rep  § Max v/c and t/c for proper hand and foot placement, use of forward momentum, and increased hip and knee ext  § B knees blocked to prevent buckling and facilitate knee ext  · Balance:  · Static standing: maxAx2      AM-PAC 6 CLICK MOBILITY  Turning over in bed (including adjusting bedclothes, sheets and blankets)?: 3  Sitting down on and standing up from a chair with arms (e.g., wheelchair, bedside commode, etc.): 2  Moving from lying on back to sitting on the side of the bed?: 2  Moving to and from a bed to a chair (including a wheelchair)?: 2  Need to walk in hospital room?: 1  Climbing 3-5 steps with a railing?: 1  Basic Mobility Total Score: 11       Therapeutic Activities and Exercises:   Performed functional mobility as described above. Required seated rest breaks between standing trials for recovery with increased WOB noted. Cues for pursed lip breathing provided.   Static standing x3 trials with maxAx2, x~30-60 sec. Max v/c and t/c for upright posture, increased knee and hip ext, thoracic ext; B knees blocked throughout. Cues for pursed lip breathing provided once in standing.  -Performed x1 rep with RW with education on transfer technique and standing posture with use of RW  -Performed medial-lateral weight-shifts in B directions with maxAx2 for maintaining standing balance  Reviewed LE therex to be performed (I) daily: LAQ, AP, hip flex, GS. Pt also instructed in propelling bedside chair forward/backward with BLE for LE strengthening. Pt v/u.    Performed scapular retraction BUE x15 reps each with t/c provided  throughout for proper exercise technique.     Patient left up in chair with all lines intact, call button in reach and pt's wife and mother present..    GOALS:   Multidisciplinary Problems     Physical Therapy Goals        Problem: Physical Therapy Goal    Goal Priority Disciplines Outcome Goal Variances Interventions   Physical Therapy Goal     PT, PT/OT Ongoing (interventions implemented as appropriate)     Description:  Goals to be met by: 2018     Patient will increase functional independence with mobility by performin. Supine to sit with Modified [place] -not met  2. Sit to stand transfer with [place] -not met  3. Bed to chair transfer with independence using no AD -not met  4. Gait  x150 feet with Supervision using LRAD. -not met  5. Lower extremity exercise program x20 reps per handout, with independence -not met                    Multidisciplinary Problems (Resolved)        Problem: Physical Therapy Goal    Goal Priority Disciplines Outcome Goal Variances Interventions   Physical Therapy Goal   (Resolved)     PT, PT/OT Resolved for Upgrade                     Time Tracking:     PT Received On: 18  PT Start Time: 917     PT Stop Time: 944  PT Total Time (min): 27 min     Billable Minutes: Neuromuscular Re-education 27   (co-tx with OT)    Treatment Type: Treatment  PT/PTA: PT     PTA Visit Number: 0     Ramandeep Kumar, PT, DPT   2018  [phone]

## 2018-12-11 NOTE — ASSESSMENT & PLAN NOTE
- See intra-abdominal abscess.  - wbc improving, cont w delirium.    - Repeat cx 12/4 with NGTD.  Completed Linezolid and Cefepime per ID.    - wbc trended up 12/11- repeat cx 12/11.

## 2018-12-11 NOTE — PLAN OF CARE
Problem: Physical Therapy Goal  Goal: Physical Therapy Goal  Goals to be met by: 2018     Patient will increase functional independence with mobility by performin. Supine to sit with Modified [place] -not met  2. Sit to stand transfer with [place] -not met  3. Bed to chair transfer with independence using no AD -not met  4. Gait  x150 feet with Supervision using LRAD. -not met  5. Lower extremity exercise program x20 reps per handout, with independence -not met        Outcome: Ongoing (interventions implemented as appropriate)  Goals reviewed and remain appropriate. Pt progressing towards goals.    Ramandeep Kumar, PT, DPT   2018  [phone]

## 2018-12-11 NOTE — PROGRESS NOTES
[facility]  Liver Transplant  Progress Note    Patient Name: Femi Enciso  MRN: [id number]  Admission Date: 10/27/2018  Hospital Length of Stay: 45 days  Code Status: Full Code  Primary Care Provider: Primary Doctor No  Post-Operative Day: 30    ORGAN:   LIVER  Disease Etiology: Alcoholic Cirrhosis  Donor Type:    - Brain Death  CDC High Risk:   No  Donor CMV Status:   Donor CMV Status: Positive  Donor HBcAB:   Negative  Donor HCV Status:   Negative  Donor HBV JAVIER: Negative  Donor HCV JAVIER: Negative  Whole or Partial: Whole Liver  Biliary Anastomosis: End to End  Arterial Anatomy: Standard  Subjective:     History of Present Illness:  Femi Enciso is a 53yr old male with PMH of acute ETOH hepatitis and DEL/ATN evolved to ESRD requiring HD (not candidate for KTX). He is now s/p liver transplant (SM induction, DBD, CMV D+/R-). Transplant surgery noted severe collateralization, adrenal gland firmly adhered to liver. Bare area of adrenal gland required several stitches and packing to obtain fair hemostasis (EBL 15L). OR take back  for bleeding from R adrenal bed in AM (significant clot in retroperitoneum, posterior to R hepatic lobe, tract posterior to the R kidney containing significant clot, small bleeding area of retroperitoneal fat) then returned to surgery same day for hemorrhaging closed with wound vac with plans to return to OR 24-48 hours for closure (retroperitoneal /retrorenal/retrocolic hematoma on the right ). Raw retroperitoneal bleeding. Suture ligatures placed, argon beam cautery, evarest placed in retroperitoneum behind cava and right kidney. Significant oozing from upper right adrenal gland, not amenable to ligation, that portion of the adrenal gland was resected with cautery). OR take back  for washout and incisional closure, no significant bleeding or hematoma found. OR cultures   from body fluid grew enterococcus faecium VRE. ID was consulted,  started on  daptomycin for VRE treatment and cefepime/flagyl to cover for IA ty in bile. Patient with significant leukocytosis with peak on 11/22 at 48K prompting drainage of R subphrenic fluid collection, perc drain placed, culture grew VRE. Stroke code called 11/21 for lethargy and unresponsive, CT head without evidence of acute ischemic changes and CTA chest negative, vascular neurology was consulted recommended MRI brain, but patient's AMS improved shortly after event. Nephrology consulted for ESRD requiring HD. Patient overall improved on antibiotic regimen, leukocytosis and AMS improved. He was transferred to [facility] on POD #15.     Hospital Course:  Interval History:  Felt flush yesterday evening,  He had 2 blood glucose reading at 1650- 70, repeat at 1732- 68.  Pt received D50.  Platelet level elevated.  WBC level has trended up.  ABx completed-  Cefepime completed 12/8/18.  Linezolid for VRE completed 12/09/18.  Repeat blood and UA/urine cx today.  He remains AOx4.  Nausea slightly better.  Cont Protonix qd and anti-emetics PRN.  Appetite is fair.  Encourage supplements and small meals multiple times per day. He was able to lay on left side yesterday w/o pain.  Pulse ox stable.  CXR ok.  He tolerated HD yesterday- 2L removed.  LFTs remain wnl.  T bili stable.  T bili prior to transplant 37.  Trazodone was increased last night to 50 mg.  He did not sleep any better.  Of note, pt had been on lexapro 20 mg.  Lexapro was d/c'd on transfer to [facility].  Psych would like to wait to restart.  Pt/CG would like to restart.  Back pain managed w Zanaflex and Lidocaine as needed.  Transfused 1 u PRBC 12/10 w appropriate response.  Stool for occult blood neg.  LD 12/3 was 246, Haptoglobin 12/3 was 229.  Iron studies low but while concern for infection, will wait to give Iron.  Continues to improve physically but slowly, will need SNF consult for placement.   Monitor.         Scheduled Meds:   albuterol-ipratropium  3 mL Nebulization  Q4H WAKE    bacitracin   Topical (Top) TID    ergocalciferol  50,000 Units Oral Q7 Days    heparin (porcine)  5,000 Units Subcutaneous Q8H    isavuconazonium sulfate  372 mg Oral Daily    lidocaine  1 patch Transdermal Daily    lidocaine HCL 10 mg/ml (1%)  1 mL Intradermal Once    nystatin  500,000 Units Oral QID (WM & HS)    pantoprazole  40 mg Oral Daily    predniSONE  5 mg Oral Daily    Followed by    [START ON 12/17/2018] predniSONE  2.5 mg Oral Daily    sodium bicarbonate  650 mg Oral BID    sulfamethoxazole-trimethoprim 400-80mg  1 tablet Oral Every Mon, Wed, Fri    tacrolimus  3 mg Oral BID    traZODone  50 mg Oral QHS    ursodiol  300 mg Oral BID    valganciclovir 50 mg/ml  100 mg Oral Once per day on Mon Wed Fri     Continuous Infusions:  PRN Meds:albuterol-ipratropium, artificial tears, bisacodyl, dextrose 50%, dextrose 50%, glucagon (human recombinant), glucose, glucose, heparin (porcine), insulin aspart U-100, midodrine, ondansetron, ramelteon, tiZANidine, traZODone    Review of Systems   Constitutional: Positive for activity change, appetite change (poor) and fatigue. Negative for fever.   HENT: Negative for facial swelling and voice change.    Eyes: Negative.    Respiratory: Positive for shortness of breath (DIXON). Negative for apnea, cough, choking, chest tightness and wheezing.    Cardiovascular: Negative.  Negative for chest pain, palpitations and leg swelling.   Gastrointestinal: Positive for abdominal distention. Negative for abdominal pain, constipation, diarrhea, nausea and vomiting.   Genitourinary: Positive for decreased urine volume. Negative for difficulty urinating, dysuria, hematuria and urgency.   Musculoskeletal: Negative.  Negative for back pain, gait problem, neck pain and neck stiffness.   Skin: Positive for wound. Negative for color change and pallor.   Allergic/Immunologic: Positive for immunocompromised state.   Neurological: Positive for weakness. Negative for  dizziness, seizures, light-headedness and headaches.   Psychiatric/Behavioral: Negative for behavioral problems, confusion, decreased concentration, dysphoric mood, hallucinations, sleep disturbance and suicidal ideas. The patient is not nervous/anxious.      Objective:     Vital Signs (Most Recent):  Temp: 99 °F (37.2 °C) (12/11/18 1547)  Pulse: 96 (12/11/18 1547)  Resp: 15 (12/11/18 1547)  BP: (!) 137/94 (12/11/18 1547)  SpO2: 98 % (12/11/18 1547) Vital Signs (24h Range):  Temp:  [98.1 °F (36.7 °C)-99 °F (37.2 °C)] 99 °F (37.2 °C)  Pulse:  [] 96  Resp:  [13-36] 15  SpO2:  [97 %-99 %] 98 %  BP: (122-143)/(78-94) 137/94     Weight: 75.1 kg (165 lb 9.1 oz)  Body mass index is 23.76 kg/m².    Intake/Output - Last 3 Shifts       12/09 0700 - 12/10 0659 12/10 0700 - 12/11 0659 12/11 0700 - 12/12 0659    P.O. 600 1025     I.V. (mL/kg) 100 (1.3)      Blood 100      Other  600     Total Intake(mL/kg) 800 (10.7) 1625 (21.6)     Urine (mL/kg/hr) 125 (0.1) 0 (0)     Emesis/NG output  0     Other  2600     Stool 0      Total Output 125 2600     Net +675 -975            Urine Occurrence 0 x 1 x     Stool Occurrence 0 x 1 x           Physical Exam   Constitutional: He is oriented to person, place, and time. He appears well-developed. No distress.   HENT:   Head: Normocephalic and atraumatic.   White drainage noted to back of throat and roof on mouth, voice hoarse   Eyes: Pupils are equal, round, and reactive to light. Scleral icterus is present.   Neck: Normal range of motion. Neck supple. No JVD present.   Cardiovascular: Normal rate, regular rhythm and normal heart sounds.   No murmur heard.  Pulmonary/Chest: Effort normal. No respiratory distress. He has decreased breath sounds in the right lower field and the left lower field. He has no wheezes. He exhibits no tenderness.   Taking short breaths, IS up to 500   Abdominal: Soft. Bowel sounds are normal. He exhibits no distension. There is tenderness.   Chevron inc ECTOR w/  staples  RLQ JOSE A drain and percutaneous drain site w suture CDI.    Musculoskeletal: Normal range of motion. He exhibits no tenderness. Edema: trace ankle edema.   Neurological: He is alert and oriented to person, place, and time. He has normal reflexes.   Skin: Skin is warm and dry. Capillary refill takes 2 to 3 seconds. He is not diaphoretic. No pallor.   Psychiatric: He has a normal mood and affect. His behavior is normal. Judgment and thought content normal. His affect is not labile. He is not slowed. Cognition and memory are not impaired.   Nursing note and vitals reviewed.      Laboratory:  Immunosuppressants         Stop Route Frequency     tacrolimus capsule 3 mg      -- Oral 2 times daily        CBC:   Recent Labs   Lab 12/11/18  0655   WBC 10.51   RBC 3.13*   HGB 9.1*   HCT 28.9*      MCV 92   MCH 29.1   MCHC 31.5*     CMP:   Recent Labs   Lab 12/11/18  0655   *   CALCIUM 8.4*   ALBUMIN 2.5*   PROT 5.6*      K 4.0   CO2 24      BUN 23*   CREATININE 2.4*   ALKPHOS 162*   ALT 16   AST 13   BILITOT 2.1*     Coagulation:   Recent Labs   Lab 12/11/18  0655   INR 1.2     Tacrolimus Lvl   Date Value Ref Range Status   12/11/2018 13.8 5.0 - 15.0 ng/mL Final     Comment:     Testing performed by Liquid Chromatography-Tandem  Mass Spectrometry (LC-MS/MS).  This test was developed and its performance characteristics  determined by [facility], Department of Pathology  and Laboratory Medicine in a manner consistent with CLIA  requirements. It has not been cleared or approved by the [place]  Food and Drug Administration.  This test is used for clinical   purposes.  It should not be regarded as investigational or for  research.     12/10/2018 7.2 5.0 - 15.0 ng/mL Final     Comment:     Testing performed by Liquid Chromatography-Tandem  Mass Spectrometry (LC-MS/MS).  This test was developed and its performance characteristics  determined by [facility], Department of  Pathology  and Laboratory Medicine in a manner consistent with CLIA  requirements. It has not been cleared or approved by the [place]  Food and Drug Administration.  This test is used for clinical   purposes.  It should not be regarded as investigational or for  research.     12/09/2018 6.1 5.0 - 15.0 ng/mL Final     Comment:     Testing performed by Liquid Chromatography-Tandem  Mass Spectrometry (LC-MS/MS).  This test was developed and its performance characteristics  determined by [facility], Department of Pathology  and Laboratory Medicine in a manner consistent with CLIA  requirements. It has not been cleared or approved by the [place]  Food and Drug Administration.  This test is used for clinical   purposes.  It should not be regarded as investigational or for  research.     12/08/2018 3.5 (L) 5.0 - 15.0 ng/mL Final     Comment:     Testing performed by Liquid Chromatography-Tandem  Mass Spectrometry (LC-MS/MS).  This test was developed and its performance characteristics  determined by [facility], Department of Pathology  and Laboratory Medicine in a manner consistent with CLIA  requirements. It has not been cleared or approved by the [place]  Food and Drug Administration.  This test is used for clinical   purposes.  It should not be regarded as investigational or for  research.           Labs within the past 24 hours have been reviewed.    Diagnostic Results:  None    Assessment/Plan:     * S/P liver transplant    - 53 year old male with history of ESLD 2/2 ETOH cirrhosis who is s/p liver transplant c/b take-back x 3 for bleeding most recently 11/18.  - LFTs now improving slowly.  T bili was 37.6 day of transplant.  - Pt remains with significant debility. Pt will need SNF placement when medically stable.   - Appetite slowly improving.  Tolerating supplements.  Encourage PO intake.   - Drain placed during take back. Drain placed to intra-abdominal abscess on 11/22.   - Fluid from collection noted  with enterococcus VRE. Cont with antibxs per ID.  De escalated to PO on 11/29/18.    - Abdominal pain well controlled, and having BMs.    - HD per nephrology.   - Muscle relaxer started and will hold off on oxycodone for now.   - LFTs remain stable.  T bili 2. was 37 prior to txp.         Nausea    - intermittent, worse over past 10 days,  Changed Pepcid to Protonix 12/9/18.  Appetite seems to be slowly improving.  Will cont to encourage multiple, small meals and cont PRN anti-emetics.  If no improvement, may need GI consult.         Delirium    - Pt with intermittent confusion since transplant was improving slowly.   - Pt with some lethargy and confusion on 11/21. Head CT negative.   - AAOx3. More alert and awake on 11/27.   - AAOx4 on 11/29.  He was happy he could remember what day it is today.    - confusion worse over the wkd.  Seroquel d/c'd 11/30/18.  Trazodone for insomnia ordered w PRN dose.    - re consulted psych, awaiting recommendations.   - delirium could be d/t rising prograf.  AMS improved since holding Prograf.    - CT head 12/4 with no acute findings.   - Restarted Prograf 12/6- will need to monitor mental status closely.  - 12/10 delirium resolved.     Moderate malnutrition    - muscle wasting noted.  Appetite on and off.  Cont supplements.  Dietician following.  Apprec recs.    - encourage multiple, small meals.     Acute renal failure with acute tubular necrosis superimposed on stage 3 chronic kidney disease    - Renal function slow to recover post-op.   - Nephrology consulted for management.   - Cont with HD as per nephrology recs.  Apprec recs.    - HD on 11/27 w/ 2L off. Pt tolerated well.    - Lasix 160 mg challenge 11/28 w/ minimal output.    - HD 12/10- 2L removed- tolerated well.   - Strict I&Os.      Intra-abdominal abscess    - Significant increase in WBC post-op.   - OR cultures from 11/18 noted with enterococcus VRE.   - Abdominal CT performed at that time with fluid collection and  drain placed on 11/22 for drainage.   - Intra-abdominal abscess culture also with enterococcus VRE.   - ID consulted and pt placed on antibxs for treatment. Pt was on Cefepime, Daptomycin, and Fluconazole for treatment.    - Pt remains with RLQ drains (one JOSE A and one Percutaneous).  One JOSE A removed 11/28.  Perc drain in placed draining tan, clear drainage.  WBC slowly improving.   - Liver US on 11/26 reviewed.   - Abdominal CT performed 11/27 and reviewed. Fluid collection noted with improvement on CT.  ID following and reassured by findings.    - Dapto, Cefepime and Flagyl changed 11/30/18 to Linezolid 600 mg PO q 12 hr x 10 days per ID.     - added back cefepime after thora.  ID re consulted.  -Completed Zyvox x 10 days per ID thru 12/9/18. Monitor.      Long-term use of immunosuppressant medication    - Cont with prograf. Draw prograf level daily and adjust dose as needed to maintain a therapeutic level.        At risk for opportunistic infections    - Cont with bactrim and valcyte for protection against opportunistic infections.   - cont Isavuconazonium.     Leucocytosis    - See intra-abdominal abscess.  - wbc improving, cont w delirium.    - Repeat cx 12/4 with NGTD.  Completed Linezolid and Cefepime per ID.    - wbc trended up 12/11- repeat cx 12/11.       Prophylactic immunotherapy    - See long term immunosuppression.        Weakness    - Pt remains significantly debilitated.   - PT/OT for strengthening.   - Currently will need SNF for placement and further rehabilitation.  Insurance does not cover Rehab.       Pleural effusion associated with hepatic disorder    - c/o increased pain w deep breath today to right side.  CXR showed increased opacity in the inferior hemothorax on the right side since 11/26/2018.  Pulse ox 97-99% on RA.  Cont to monitor    - continues to have fluid to RLL.  WBC remains elevated.    - thoracentesis performed on 11/30. Fluid noted with 4k WBC, 93 SEGS. cx no growth to date.   Cefepime added for treatment.  - Chest CT showing right pleural effusion improved, left w increased pleural effusion.   - Per ID, completed treatment of empyema w Cefepime thru 12/8.  - sats remain 97-99% on RA.     Type 2 diabetes mellitus without complication    - Endocrine consulted for management. Appreciate recs.   - hypoglycemia 12/10 requiring D50.    - repeat cx 12/11.       Anemia of chronic disease    - H&H trending down. Transfused 1 unit of PRBC with HD- . Monitor with daily labs.   - Chest/Abd/pelvis CT 12/4- no fluid to be drainged per transplant surgeon.  No source of bleeding.     - last liver US 11/27. Evolving peritransplant hematomas with anechoic fluid collection adjacent to the right hepatic lobe.  Small volume perihepatic free fluid.     DEL (acute kidney injury)    - DEL for over 2 weeks prior to transplant. Nephrology was consulted.     - Post transplant, Nephrology cont to follow.  He received HD last on 11/30 for metabolic clearance and fluid management.     - Attempted Lasix challenge (160 mg) x1 on 11/28- pt diuresed 105 cc.    - HD 12/3- removed 1.5 liter.  Cont strict I/O's.  Monitor closely.  - Lasix challenge 12/4 with minimal UOP  - Last HD 12/5- 2.3L removed.    - Crackles to mell bases and dependent edema.  Lasix 100mg and albumin x3 12/6/18.  - HD 12/10- 2L removed- tolerated well.     Acute renal failure    - DEL since transplant.  Pt on Midodrine.  Last HD 11/9/18- 0 fluid removed 2/2 hypotension.  - Nephrology following for HD-  Pt cont to have hypotenstion worse w standing.  Midodrine 10 mg PRN for hypotension during HD ordered.   - BP since improved.  Midodrine discontinued.           VTE Risk Mitigation (From admission, onward)        Ordered     heparin (porcine) injection 5,000 Units  Every 8 hours      11/30/18 1149     heparin (porcine) injection 1,000 Units  As needed (PRN)      11/25/18 1428     IP VTE HIGH RISK PATIENT  Once      11/11/18 1208          The patients  clinical status was discussed at multidisplinary rounds, involving transplant surgery, transplant medicine, pharmacy, nursing, nutrition, and social work    Discharge Planning:  No plan for discharge.  Will need transfer to SNF when medically safe.    Hilary Galarza NP  Liver Transplant  [facility]

## 2018-12-11 NOTE — ASSESSMENT & PLAN NOTE
- Pt with intermittent confusion since transplant was improving slowly.   - Pt with some lethargy and confusion on 11/21. Head CT negative.   - AAOx3. More alert and awake on 11/27.   - AAOx4 on 11/29.  He was happy he could remember what day it is today.    - confusion worse over the wkd.  Seroquel d/c'd 11/30/18.  Trazodone for insomnia ordered w PRN dose.    - re consulted psych, awaiting recommendations.   - delirium could be d/t rising prograf.  AMS improved since holding Prograf.    - CT head 12/4 with no acute findings.   - Restarted Prograf 12/6- will need to monitor mental status closely.  - 12/10 delirium resolved.

## 2018-12-11 NOTE — ASSESSMENT & PLAN NOTE
- DEL since transplant.  Pt on Midodrine.  Last HD 11/9/18- 0 fluid removed 2/2 hypotension.  - Nephrology following for HD-  Pt cont to have hypotenstion worse w standing.  Midodrine 10 mg PRN for hypotension during HD ordered.   - BP since improved.  Midodrine discontinued.

## 2018-12-11 NOTE — ASSESSMENT & PLAN NOTE
- intermittent, worse over past 10 days,  Changed Pepcid to Protonix 12/9/18.  Appetite seems to be slowly improving.  Will cont to encourage multiple, small meals and cont PRN anti-emetics.  If no improvement, may need GI consult.

## 2018-12-11 NOTE — PLAN OF CARE
Problem: Occupational Therapy Goal  Goal: Occupational Therapy Goal  Goals to be met by: 12/18/18     Patient will increase functional independence with ADLs by performing:    UE Dressing with Supervision.  LE Dressing with Minimal Assistance.  Grooming while standing at sink with Stand-by Assistance.  Toileting from toilet with Minimal Assistance for hygiene and clothing management.   Toilet transfer to toilet with Stand-by Assistance.  Upper extremity  theraband exercise program x  15 reps  with independence. Met 12/9         Continue OT POC     Comments: Tristan Spencer OTR/L  12/11/2018

## 2018-12-11 NOTE — PT/OT/SLP PROGRESS
Occupational Therapy   Co-Treatment    Name: Femi Enciso  MRN: [id number]  Admitting Diagnosis:  S/P liver transplant  23 Days Post-Op    Recommendations:     Discharge Recommendations: nursing facility, skilled  Discharge Equipment Recommendations:  (tbd)  Barriers to discharge:  None    Subjective     Pain/Comfort:  · Pain Rating 1: 5/10  · Location - Side 1: Bilateral  · Location - Orientation 1: upper  · Location 1: abdomen  · Pain Addressed 1: Reposition, Distraction  · Pain Rating Post-Intervention 1: 0/10    Objective:     Communicated with: RN prior to session.  Patient found with all lines intact, call button in reach and spouse and mother present and telemetry, pulse ox (continuous) upon OT entry to room.    General Precautions: Standard, fall   Orthopedic Precautions:N/A   Braces: N/A     Occupational Performance:  · Pt received UIC.     Functional Mobility/Transfers:  · Patient completed Sit <> Stand Transfer with maximal assistance, of 2 persons and performed 3 times  with  no assistive device, rolling walker and 2 trials no AD and 1 trial w/ RW   · Functional Mobility: Pt unable to take steps this day but was able to weight shift at max a.     Activities of Daily Living:  · Grooming: set-up assistanced washing face  · Upper Body Dressing: minimum assistance donned gown as robe seated  · Lower Body Dressing: minimum assistance min a to get into figure 4 position; once in position pt able to complete donning of socks at [facility] 6 Click ADL: 16    Treatment & Education:  Pt performed static stand for ~30 sec to 1 minute for 3 trials at max a w/ 1 trial w/ RW and 2 trials w/o AD. OT worked on balance and atanding endurance as PT worked on proper posture and postural control.  Pt performed scapular retractions for 15 reps.   Pt educated on POC.     Patient left up in chair with all lines intact and call button in reach  Education:    Assessment:     Femi Enciso is a 53 y.o. male with a medical diagnosis  of S/P liver transplant. Pt tolerated session fair today. Pt did display self-limiting behaviors but did good to participate w/ encouragement. Pt displayed global deconditioning requiring increased assist for ADLs and mobility at this time. Pt would benefit from skilled OT services to improve independence and overall occupational functioning.      He presents with the following performance deficits affecting function are weakness, impaired endurance, impaired self care skills, impaired functional mobilty, gait instability, impaired balance, decreased upper extremity function, decreased lower extremity function, impaired cardiopulmonary response to activity, pain.     Rehab Prognosis:  Good; patient would benefit from acute skilled OT services to address these deficits and reach maximum level of function.       Plan:     Patient to be seen 4 x/week to address the above listed problems via self-care/home management, therapeutic activities, therapeutic exercises, neuromuscular re-education  · Plan of Care Expires: 12/21/18  · Plan of Care Reviewed with: patient, mother, spouse    This Plan of care has been discussed with the patient who was involved in its development and understands and is in agreement with the identified goals and treatment plan    GOALS:   Multidisciplinary Problems     Occupational Therapy Goals        Problem: Occupational Therapy Goal    Goal Priority Disciplines Outcome Interventions   Occupational Therapy Goal     OT, PT/OT     Description:  Goals to be met by: 12/18/18     Patient will increase functional independence with ADLs by performing:    UE Dressing with Supervision.  LE Dressing with Minimal Assistance.  Grooming while standing at sink with Stand-by Assistance.  Toileting from toilet with Minimal Assistance for hygiene and clothing management.   Toilet transfer to toilet with Stand-by Assistance.  Upper extremity  theraband exercise program x  15 reps  with independence. Met 12/9                      Multidisciplinary Problems (Resolved)        Problem: Occupational Therapy Goal    Goal Priority Disciplines Outcome Interventions   Occupational Therapy Goal   (Resolved)     OT, PT/OT Resolved for Upgrade                    Time Tracking:     OT Date of Treatment: 12/11/18  OT Start Time: 0914  OT Stop Time: 0941  OT Total Time (min): 27 min    Billable Minutes:Therapeutic Activity 27 minutes    Tristan Spencer, OT  12/11/2018

## 2018-12-11 NOTE — SUBJECTIVE & OBJECTIVE
Scheduled Meds:   albuterol-ipratropium  3 mL Nebulization Q4H WAKE    bacitracin   Topical (Top) TID    ergocalciferol  50,000 Units Oral Q7 Days    heparin (porcine)  5,000 Units Subcutaneous Q8H    isavuconazonium sulfate  372 mg Oral Daily    lidocaine  1 patch Transdermal Daily    lidocaine HCL 10 mg/ml (1%)  1 mL Intradermal Once    nystatin  500,000 Units Oral QID (WM & HS)    pantoprazole  40 mg Oral Daily    predniSONE  5 mg Oral Daily    Followed by    [START ON 12/17/2018] predniSONE  2.5 mg Oral Daily    sodium bicarbonate  650 mg Oral BID    sulfamethoxazole-trimethoprim 400-80mg  1 tablet Oral Every Mon, Wed, Fri    tacrolimus  3 mg Oral BID    traZODone  50 mg Oral QHS    ursodiol  300 mg Oral BID    valganciclovir 50 mg/ml  100 mg Oral Once per day on Mon Wed Fri     Continuous Infusions:  PRN Meds:albuterol-ipratropium, artificial tears, bisacodyl, dextrose 50%, dextrose 50%, glucagon (human recombinant), glucose, glucose, heparin (porcine), insulin aspart U-100, midodrine, ondansetron, ramelteon, tiZANidine, traZODone    Review of Systems   Constitutional: Positive for activity change, appetite change (poor) and fatigue. Negative for fever.   HENT: Negative for facial swelling and voice change.    Eyes: Negative.    Respiratory: Positive for shortness of breath (DIXON). Negative for apnea, cough, choking, chest tightness and wheezing.    Cardiovascular: Negative.  Negative for chest pain, palpitations and leg swelling.   Gastrointestinal: Positive for abdominal distention. Negative for abdominal pain, constipation, diarrhea, nausea and vomiting.   Genitourinary: Positive for decreased urine volume. Negative for difficulty urinating, dysuria, hematuria and urgency.   Musculoskeletal: Negative.  Negative for back pain, gait problem, neck pain and neck stiffness.   Skin: Positive for wound. Negative for color change and pallor.   Allergic/Immunologic: Positive for immunocompromised  state.   Neurological: Positive for weakness. Negative for dizziness, seizures, light-headedness and headaches.   Psychiatric/Behavioral: Negative for behavioral problems, confusion, decreased concentration, dysphoric mood, hallucinations, sleep disturbance and suicidal ideas. The patient is not nervous/anxious.      Objective:     Vital Signs (Most Recent):  Temp: 99 °F (37.2 °C) (12/11/18 1547)  Pulse: 96 (12/11/18 1547)  Resp: 15 (12/11/18 1547)  BP: (!) 137/94 (12/11/18 1547)  SpO2: 98 % (12/11/18 1547) Vital Signs (24h Range):  Temp:  [98.1 °F (36.7 °C)-99 °F (37.2 °C)] 99 °F (37.2 °C)  Pulse:  [] 96  Resp:  [13-36] 15  SpO2:  [97 %-99 %] 98 %  BP: (122-143)/(78-94) 137/94     Weight: 75.1 kg (165 lb 9.1 oz)  Body mass index is 23.76 kg/m².    Intake/Output - Last 3 Shifts       12/09 0700 - 12/10 0659 12/10 0700 - 12/11 0659 12/11 0700 - 12/12 0659    P.O. 600 1025     I.V. (mL/kg) 100 (1.3)      Blood 100      Other  600     Total Intake(mL/kg) 800 (10.7) 1625 (21.6)     Urine (mL/kg/hr) 125 (0.1) 0 (0)     Emesis/NG output  0     Other  2600     Stool 0      Total Output 125 2600     Net +675 -975            Urine Occurrence 0 x 1 x     Stool Occurrence 0 x 1 x           Physical Exam   Constitutional: He is oriented to person, place, and time. He appears well-developed. No distress.   HENT:   Head: Normocephalic and atraumatic.   White drainage noted to back of throat and roof on mouth, voice hoarse   Eyes: Pupils are equal, round, and reactive to light. Scleral icterus is present.   Neck: Normal range of motion. Neck supple. No JVD present.   Cardiovascular: Normal rate, regular rhythm and normal heart sounds.   No murmur heard.  Pulmonary/Chest: Effort normal. No respiratory distress. He has decreased breath sounds in the right lower field and the left lower field. He has no wheezes. He exhibits no tenderness.   Taking short breaths, IS up to 500   Abdominal: Soft. Bowel sounds are normal. He  exhibits no distension. There is tenderness.   Chevron inc ETCOR w/ staples  RLQ JOSE A drain and percutaneous drain site w suture CDI.    Musculoskeletal: Normal range of motion. He exhibits no tenderness. Edema: trace ankle edema.   Neurological: He is alert and oriented to person, place, and time. He has normal reflexes.   Skin: Skin is warm and dry. Capillary refill takes 2 to 3 seconds. He is not diaphoretic. No pallor.   Psychiatric: He has a normal mood and affect. His behavior is normal. Judgment and thought content normal. His affect is not labile. He is not slowed. Cognition and memory are not impaired.   Nursing note and vitals reviewed.      Laboratory:  Immunosuppressants         Stop Route Frequency     tacrolimus capsule 3 mg      -- Oral 2 times daily        CBC:   Recent Labs   Lab 12/11/18  0655   WBC 10.51   RBC 3.13*   HGB 9.1*   HCT 28.9*      MCV 92   MCH 29.1   MCHC 31.5*     CMP:   Recent Labs   Lab 12/11/18  0655   *   CALCIUM 8.4*   ALBUMIN 2.5*   PROT 5.6*      K 4.0   CO2 24      BUN 23*   CREATININE 2.4*   ALKPHOS 162*   ALT 16   AST 13   BILITOT 2.1*     Coagulation:   Recent Labs   Lab 12/11/18  0655   INR 1.2     Tacrolimus Lvl   Date Value Ref Range Status   12/11/2018 13.8 5.0 - 15.0 ng/mL Final     Comment:     Testing performed by Liquid Chromatography-Tandem  Mass Spectrometry (LC-MS/MS).  This test was developed and its performance characteristics  determined by [facility], Department of Pathology  and Laboratory Medicine in a manner consistent with CLIA  requirements. It has not been cleared or approved by the [place]  Food and Drug Administration.  This test is used for clinical   purposes.  It should not be regarded as investigational or for  research.     12/10/2018 7.2 5.0 - 15.0 ng/mL Final     Comment:     Testing performed by Liquid Chromatography-Tandem  Mass Spectrometry (LC-MS/MS).  This test was developed and its performance  characteristics  determined by [facility], Department of Pathology  and Laboratory Medicine in a manner consistent with CLIA  requirements. It has not been cleared or approved by the [place]  Food and Drug Administration.  This test is used for clinical   purposes.  It should not be regarded as investigational or for  research.     12/09/2018 6.1 5.0 - 15.0 ng/mL Final     Comment:     Testing performed by Liquid Chromatography-Tandem  Mass Spectrometry (LC-MS/MS).  This test was developed and its performance characteristics  determined by [facility], Department of Pathology  and Laboratory Medicine in a manner consistent with CLIA  requirements. It has not been cleared or approved by the [place]  Food and Drug Administration.  This test is used for clinical   purposes.  It should not be regarded as investigational or for  research.     12/08/2018 3.5 (L) 5.0 - 15.0 ng/mL Final     Comment:     Testing performed by Liquid Chromatography-Tandem  Mass Spectrometry (LC-MS/MS).  This test was developed and its performance characteristics  determined by [facility], Department of Pathology  and Laboratory Medicine in a manner consistent with CLIA  requirements. It has not been cleared or approved by the [place]  Food and Drug Administration.  This test is used for clinical   purposes.  It should not be regarded as investigational or for  research.           Labs within the past 24 hours have been reviewed.    Diagnostic Results:  None

## 2018-12-11 NOTE — ASSESSMENT & PLAN NOTE
- Endocrine consulted for management. Appreciate recs.   - hypoglycemia 12/10 requiring D50.    - repeat cx 12/11.

## 2018-12-11 NOTE — ASSESSMENT & PLAN NOTE
- c/o increased pain w deep breath today to right side.  CXR showed increased opacity in the inferior hemothorax on the right side since 11/26/2018.  Pulse ox 97-99% on RA.  Cont to monitor    - continues to have fluid to RLL.  WBC remains elevated.    - thoracentesis performed on 11/30. Fluid noted with 4k WBC, 93 SEGS. cx no growth to date.  Cefepime added for treatment.  - Chest CT showing right pleural effusion improved, left w increased pleural effusion.   - Per ID, completed treatment of empyema w Cefepime thru 12/8.  - sats remain 97-99% on RA.

## 2018-12-12 LAB
ALBUMIN SERPL BCP-MCNC: 2.5 G/DL
ALP SERPL-CCNC: 159 U/L
ALT SERPL W/O P-5'-P-CCNC: 15 U/L
ANION GAP SERPL CALC-SCNC: 9 MMOL/L
AST SERPL-CCNC: 12 U/L
BASOPHILS # BLD AUTO: 0.1 K/UL
BASOPHILS NFR BLD: 1.1 %
BILIRUB SERPL-MCNC: 2 MG/DL
BUN SERPL-MCNC: 29 MG/DL
CALCIUM SERPL-MCNC: 8.7 MG/DL
CHLORIDE SERPL-SCNC: 104 MMOL/L
CO2 SERPL-SCNC: 24 MMOL/L
CREAT SERPL-MCNC: 3.2 MG/DL
DIFFERENTIAL METHOD: ABNORMAL
EOSINOPHIL # BLD AUTO: 0.1 K/UL
EOSINOPHIL NFR BLD: 1 %
ERYTHROCYTE [DISTWIDTH] IN BLOOD BY AUTOMATED COUNT: 15.9 %
EST. GFR  (AFRICAN AMERICAN): 24.2 ML/MIN/1.73 M^2
EST. GFR  (NON AFRICAN AMERICAN): 21 ML/MIN/1.73 M^2
GLUCOSE SERPL-MCNC: 97 MG/DL
HBV CORE AB SERPL QL IA: NEGATIVE
HBV CORE IGM SERPL QL IA: NEGATIVE
HBV SURFACE AG SERPL QL IA: NEGATIVE
HCT VFR BLD AUTO: 27.4 %
HGB BLD-MCNC: 8.8 G/DL
IMM GRANULOCYTES # BLD AUTO: 0.05 K/UL
IMM GRANULOCYTES NFR BLD AUTO: 0.6 %
INR PPP: 1.2
LYMPHOCYTES # BLD AUTO: 0.6 K/UL
LYMPHOCYTES NFR BLD: 6.5 %
MAGNESIUM SERPL-MCNC: 1.7 MG/DL
MCH RBC QN AUTO: 30.2 PG
MCHC RBC AUTO-ENTMCNC: 32.1 G/DL
MCV RBC AUTO: 94 FL
MONOCYTES # BLD AUTO: 0.9 K/UL
MONOCYTES NFR BLD: 10.4 %
NEUTROPHILS # BLD AUTO: 7 K/UL
NEUTROPHILS NFR BLD: 80.4 %
NRBC BLD-RTO: 0 /100 WBC
PHOSPHATE SERPL-MCNC: 2.9 MG/DL
PLATELET # BLD AUTO: 241 K/UL
PMV BLD AUTO: 10.9 FL
POCT GLUCOSE: 85 MG/DL (ref 70–110)
POCT GLUCOSE: 88 MG/DL (ref 70–110)
POCT GLUCOSE: 88 MG/DL (ref 70–110)
POCT GLUCOSE: 99 MG/DL (ref 70–110)
POTASSIUM SERPL-SCNC: 3.8 MMOL/L
PROT SERPL-MCNC: 5.7 G/DL
PROTHROMBIN TIME: 12.3 SEC
RBC # BLD AUTO: 2.91 M/UL
SODIUM SERPL-SCNC: 137 MMOL/L
TACROLIMUS BLD-MCNC: 21.3 NG/ML
WBC # BLD AUTO: 8.75 K/UL

## 2018-12-12 PROCEDURE — 90935 HEMODIALYSIS ONE EVALUATION: CPT

## 2018-12-12 PROCEDURE — 84100 ASSAY OF PHOSPHORUS: CPT

## 2018-12-12 PROCEDURE — 25000003 PHARM REV CODE 250: Performed by: NURSE PRACTITIONER

## 2018-12-12 PROCEDURE — 63600175 PHARM REV CODE 636 W HCPCS: Performed by: NURSE PRACTITIONER

## 2018-12-12 PROCEDURE — 94664 DEMO&/EVAL PT USE INHALER: CPT

## 2018-12-12 PROCEDURE — 86480 TB TEST CELL IMMUN MEASURE: CPT

## 2018-12-12 PROCEDURE — 87340 HEPATITIS B SURFACE AG IA: CPT

## 2018-12-12 PROCEDURE — 99900035 HC TECH TIME PER 15 MIN (STAT)

## 2018-12-12 PROCEDURE — 99232 SBSQ HOSP IP/OBS MODERATE 35: CPT | Mod: ,,, | Performed by: PSYCHIATRY & NEUROLOGY

## 2018-12-12 PROCEDURE — 20600001 HC STEP DOWN PRIVATE ROOM

## 2018-12-12 PROCEDURE — 25000003 PHARM REV CODE 250: Performed by: STUDENT IN AN ORGANIZED HEALTH CARE EDUCATION/TRAINING PROGRAM

## 2018-12-12 PROCEDURE — 80197 ASSAY OF TACROLIMUS: CPT

## 2018-12-12 PROCEDURE — 85610 PROTHROMBIN TIME: CPT

## 2018-12-12 PROCEDURE — 83735 ASSAY OF MAGNESIUM: CPT

## 2018-12-12 PROCEDURE — 99233 PR SUBSEQUENT HOSPITAL CARE,LEVL III: ICD-10-PCS | Mod: 24,,, | Performed by: NURSE PRACTITIONER

## 2018-12-12 PROCEDURE — 85025 COMPLETE CBC W/AUTO DIFF WBC: CPT

## 2018-12-12 PROCEDURE — 25000242 PHARM REV CODE 250 ALT 637 W/ HCPCS: Performed by: NURSE PRACTITIONER

## 2018-12-12 PROCEDURE — 86705 HEP B CORE ANTIBODY IGM: CPT

## 2018-12-12 PROCEDURE — 86704 HEP B CORE ANTIBODY TOTAL: CPT

## 2018-12-12 PROCEDURE — 94640 AIRWAY INHALATION TREATMENT: CPT

## 2018-12-12 PROCEDURE — 27000646 HC AEROBIKA DEVICE

## 2018-12-12 PROCEDURE — 94761 N-INVAS EAR/PLS OXIMETRY MLT: CPT

## 2018-12-12 PROCEDURE — 99232 PR SUBSEQUENT HOSPITAL CARE,LEVL II: ICD-10-PCS | Mod: ,,, | Performed by: PSYCHIATRY & NEUROLOGY

## 2018-12-12 PROCEDURE — 99233 SBSQ HOSP IP/OBS HIGH 50: CPT | Mod: 24,,, | Performed by: NURSE PRACTITIONER

## 2018-12-12 PROCEDURE — 80053 COMPREHEN METABOLIC PANEL: CPT

## 2018-12-12 PROCEDURE — 63600175 PHARM REV CODE 636 W HCPCS: Performed by: STUDENT IN AN ORGANIZED HEALTH CARE EDUCATION/TRAINING PROGRAM

## 2018-12-12 RX ORDER — TACROLIMUS 1 MG/1
2 CAPSULE ORAL 2 TIMES DAILY
Status: DISCONTINUED | OUTPATIENT
Start: 2018-12-12 | End: 2018-12-16

## 2018-12-12 RX ORDER — SODIUM CHLORIDE 9 MG/ML
INJECTION, SOLUTION INTRAVENOUS ONCE
Status: COMPLETED | OUTPATIENT
Start: 2018-12-12 | End: 2018-12-12

## 2018-12-12 RX ORDER — ACETAMINOPHEN 325 MG/1
650 TABLET ORAL EVERY 6 HOURS PRN
Status: DISCONTINUED | OUTPATIENT
Start: 2018-12-12 | End: 2019-01-08 | Stop reason: HOSPADM

## 2018-12-12 RX ORDER — NAPROXEN SODIUM 220 MG/1
81 TABLET, FILM COATED ORAL DAILY
Status: DISCONTINUED | OUTPATIENT
Start: 2018-12-12 | End: 2019-01-08 | Stop reason: HOSPADM

## 2018-12-12 RX ORDER — HEPARIN SODIUM 1000 [USP'U]/ML
1000 INJECTION, SOLUTION INTRAVENOUS; SUBCUTANEOUS
Status: DISCONTINUED | OUTPATIENT
Start: 2018-12-12 | End: 2019-01-08 | Stop reason: HOSPADM

## 2018-12-12 RX ADMIN — BACITRACIN: 500 OINTMENT TOPICAL at 09:12

## 2018-12-12 RX ADMIN — NYSTATIN 500000 UNITS: 500000 SUSPENSION ORAL at 12:12

## 2018-12-12 RX ADMIN — TACROLIMUS 3 MG: 1 CAPSULE ORAL at 06:12

## 2018-12-12 RX ADMIN — TRAZODONE HYDROCHLORIDE 25 MG: 50 TABLET ORAL at 03:12

## 2018-12-12 RX ADMIN — TRAZODONE HYDROCHLORIDE 50 MG: 50 TABLET ORAL at 08:12

## 2018-12-12 RX ADMIN — SULFAMETHOXAZOLE AND TRIMETHOPRIM 1 TABLET: 400; 80 TABLET ORAL at 08:12

## 2018-12-12 RX ADMIN — IPRATROPIUM BROMIDE AND ALBUTEROL SULFATE 3 ML: .5; 3 SOLUTION RESPIRATORY (INHALATION) at 11:12

## 2018-12-12 RX ADMIN — PANTOPRAZOLE SODIUM 40 MG: 40 TABLET, DELAYED RELEASE ORAL at 09:12

## 2018-12-12 RX ADMIN — HEPARIN SODIUM 5000 UNITS: 5000 INJECTION, SOLUTION INTRAVENOUS; SUBCUTANEOUS at 01:12

## 2018-12-12 RX ADMIN — URSODIOL 300 MG: 300 CAPSULE ORAL at 09:12

## 2018-12-12 RX ADMIN — SODIUM BICARBONATE 650 MG TABLET 650 MG: at 09:12

## 2018-12-12 RX ADMIN — HEPARIN SODIUM 5000 UNITS: 5000 INJECTION, SOLUTION INTRAVENOUS; SUBCUTANEOUS at 06:12

## 2018-12-12 RX ADMIN — ASPIRIN 81 MG CHEWABLE TABLET 81 MG: 81 TABLET CHEWABLE at 06:12

## 2018-12-12 RX ADMIN — HEPARIN SODIUM 5000 UNITS: 5000 INJECTION, SOLUTION INTRAVENOUS; SUBCUTANEOUS at 08:12

## 2018-12-12 RX ADMIN — SODIUM BICARBONATE 650 MG TABLET 650 MG: at 06:12

## 2018-12-12 RX ADMIN — NYSTATIN 500000 UNITS: 500000 SUSPENSION ORAL at 06:12

## 2018-12-12 RX ADMIN — BACITRACIN: 500 OINTMENT TOPICAL at 08:12

## 2018-12-12 RX ADMIN — TIZANIDINE 2 MG: 2 TABLET ORAL at 03:12

## 2018-12-12 RX ADMIN — IPRATROPIUM BROMIDE AND ALBUTEROL SULFATE 3 ML: .5; 3 SOLUTION RESPIRATORY (INHALATION) at 07:12

## 2018-12-12 RX ADMIN — ISAVUCONAZONIUM SULFATE 372 MG: 186 CAPSULE ORAL at 09:12

## 2018-12-12 RX ADMIN — BACITRACIN: 500 OINTMENT TOPICAL at 01:12

## 2018-12-12 RX ADMIN — NYSTATIN 500000 UNITS: 500000 SUSPENSION ORAL at 08:12

## 2018-12-12 RX ADMIN — ACETAMINOPHEN 650 MG: 325 TABLET, FILM COATED ORAL at 01:12

## 2018-12-12 RX ADMIN — IPRATROPIUM BROMIDE AND ALBUTEROL SULFATE 3 ML: .5; 3 SOLUTION RESPIRATORY (INHALATION) at 09:12

## 2018-12-12 RX ADMIN — HEPARIN SODIUM 1000 UNITS: 1000 INJECTION, SOLUTION INTRAVENOUS; SUBCUTANEOUS at 05:12

## 2018-12-12 RX ADMIN — SODIUM CHLORIDE: 0.9 INJECTION, SOLUTION INTRAVENOUS at 02:12

## 2018-12-12 RX ADMIN — MORPHINE 100 MG: 10 SOLUTION ORAL at 08:12

## 2018-12-12 RX ADMIN — TACROLIMUS 2 MG: 1 CAPSULE ORAL at 06:12

## 2018-12-12 RX ADMIN — NYSTATIN 500000 UNITS: 500000 SUSPENSION ORAL at 09:12

## 2018-12-12 RX ADMIN — PREDNISONE 5 MG: 5 TABLET ORAL at 06:12

## 2018-12-12 RX ADMIN — URSODIOL 300 MG: 300 CAPSULE ORAL at 08:12

## 2018-12-12 RX ADMIN — TIZANIDINE 2 MG: 2 TABLET ORAL at 08:12

## 2018-12-12 NOTE — PROGRESS NOTES
[facility] H[facility]ODIALYSIS NOTE     Patient currently on hemodialysis for removal of uremic toxins and volume.    Patient seen and evaluated on hemodialysis, tolerating treatment, see HD flowsheet for vitals and assessments.      Ultrafiltration goal is 2L     Labs have been reviewed and the dialysate bath has been adjusted.     Assessment/Plan:     HD today for removal of uremic toxins and volume.  12/10 CXR showed worsening pulmonary edema.     Patient has had 2 L off each last 2 sessions, will follow  And may need extra UF later this week

## 2018-12-12 NOTE — SUBJECTIVE & OBJECTIVE
"Interval History:   Awoke twice overnight, which pt and wife attribute to back pain.  States no difficulty falling asleep.    Received trazodone 50 QHS + 25 mg PRN x1 last night, as well as tizanidine 2 mg x1.  No ramelteon given.      Family History     None        Tobacco Use    Smoking status: Never Smoker   Substance and Sexual Activity    Alcohol use: Not on file    Drug use: Not on file    Sexual activity: Not on file     Psychotherapeutics (From admission, onward)    Start     Stop Route Frequency Ordered    12/10/18 2100  traZODone tablet 50 mg      -- Oral Nightly 12/10/18 1613    12/10/18 1713  ramelteon tablet 8 mg      -- Oral Nightly PRN 12/10/18 1614    11/30/18 1233  trazodone split tablet 25 mg      -- Oral Nightly PRN 11/30/18 1150    11/14/18 1118  haloperidol lactate (HALDOL) 5 mg/mL injection     Comments:  Created by cabinet override    11/14 6663   11/14/18 1118           Review of Systems   Constitutional: Negative for fever.   Gastrointestinal: Positive for nausea.   Neurological: Negative for weakness.   Psychiatric/Behavioral: Positive for sleep disturbance. Negative for confusion, decreased concentration and hallucinations.     Objective:     Vital Signs (Most Recent):  Temp: 98.5 °F (36.9 °C) (12/12/18 1101)  Pulse: 92 (12/12/18 1433)  Resp: 20 (12/12/18 1103)  BP: (!) 154/90 (12/12/18 1433)  SpO2: 98 % (12/12/18 1103) Vital Signs (24h Range):  Temp:  [98.5 °F (36.9 °C)-99 °F (37.2 °C)] 98.5 °F (36.9 °C)  Pulse:  [] 92  Resp:  [15-31] 20  SpO2:  [97 %-100 %] 98 %  BP: (114-154)/(70-94) 154/90     Height: 5' 10" (177.8 cm)  Weight: 75.9 kg (167 lb 5.3 oz)  Body mass index is 24.01 kg/m².      Intake/Output Summary (Last 24 hours) at 12/12/2018 1440  Last data filed at 12/12/2018 0600  Gross per 24 hour   Intake 600 ml   Output 30 ml   Net 570 ml       Physical Exam   Constitutional: He appears well-developed. No distress.   HENT:   Head: Normocephalic.   Cardiovascular: Normal " rate and regular rhythm.   Pulmonary/Chest: Effort normal.   Abdominal: Soft.   Musculoskeletal: He exhibits no edema.           Mental Status Exam:  Appearance: age appropriate, lying in bed  Grooming: appropriate to situation  Arousal: awake.  Behavior/Cooperation: cooperative  Speech: Appropriate  Language: appropriate english vocabulary  Affect: normal  Thought Process: normal  Thought Content: appropriate  Associations: no loose associations noted  Orientation: month/year, day of week.  Not date.  Fund of Knowledge: Decreased  Insight: fair  Judgment: fair     Significant Labs: All pertinent labs within the past 24 hours have been reviewed.    12/12 Tacrolimus level: 21.3  12/11 UA: 15 WBC, many bacteria.    Microbiology Results (last 7 days)     Procedure Component Value Units Date/Time    Blood culture [[id number]] Collected:  12/11/18 1211    Order Status:  Completed Specimen:  Blood Updated:  12/12/18 1412     Blood Culture, Routine No Growth to date     Blood Culture, Routine No Growth to date    Narrative:       Draw 15 min apart    Blood culture [[id number]] Collected:  12/11/18 1211    Order Status:  Completed Specimen:  Blood Updated:  12/12/18 1412     Blood Culture, Routine No Growth to date     Blood Culture, Routine No Growth to date    Urine culture [[id number]] Collected:  12/11/18 2122    Order Status:  Sent Specimen:  Urine, Clean Catch Updated:  12/11/18 2239    Fungus culture [[id number]] Collected:  11/11/18 0415    Order Status:  Completed Specimen:  Pleural Fluid from Ascites Updated:  12/11/18 1448     Fungus (Mycology) Culture No fungus isolated after 4 weeks    Culture, Anaerobic [[id number]] Collected:  11/30/18 1617    Order Status:  Completed Specimen:  Body Fluid from Pleural Fluid Updated:  12/10/18 0711     Anaerobic Culture No anaerobes isolated    Blood culture [[id number]] Collected:  12/04/18 1232    Order Status:  Completed Specimen:  Blood Updated:  12/09/18 1412     Blood  Culture, Routine No growth after 5 days.    Narrative:       Draw 15 min apart    Blood culture [[id number]] Collected:  12/04/18 1245    Order Status:  Completed Specimen:  Blood Updated:  12/09/18 1412     Blood Culture, Routine No growth after 5 days.    Narrative:       Draw 15 min apart    Urine culture [[id number]] Collected:  12/04/18 1712    Order Status:  Completed Specimen:  Urine, Clean Catch Updated:  12/05/18 2209     Urine Culture, Routine No growth           Significant Imaging: I have reviewed all pertinent imaging results/findings within the past 24 hours.

## 2018-12-12 NOTE — PLAN OF CARE
"Problem: Patient Care Overview  Goal: Plan of Care Review  Outcome: Ongoing (interventions implemented as appropriate)  AAOx3 and appropriate, but  Pt stated he still feels groggy, afebrile, c/o pain x1 to back--per NP Darlin administered tylenol with some relief. Pt requesting prn trazadone and tizandine. Pt stated he feels nauseated but denied need for prn. When asked what did he eat for dinner he said "nothing, that he wasn't hungry." Encouraged pt to take zofran when needed to ensure he is able to eat. Pt in lowest postion, side rails up x2, non skid footwear in place, call light within reach, pt verbalized understanding to call the nurse when needed. Pt able to reposition self independently. Hand hygiene practiced per protocol.   Will continue to monitor.           "

## 2018-12-12 NOTE — PROGRESS NOTES
Pt c/o pain to mid back--per NP hiliary stated ok to give tylenol 650 mg q6hrs PRN. Will continue to monitor.
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
## 2018-12-12 NOTE — PLAN OF CARE
Problem: Patient Care Overview  Goal: Plan of Care Review  - Patient is AAOx4. Patient educated to use call bell for assistance, verbalized understanding. Patient's wife at bedside attentive to patient's needs. Patient transfers with 2 person assistance. Patient repositioned in bed independently.   - Afebrile, WBC 8.75   - Chevron incision ECTOR with steri strips, bacitracin applied to RLQ JOSE A sites per order   - Patient refused lidocaine patch stating it does not help   - Accuchecks AC/HS - no SSI needed so far this shift. Patient with OK appetite. No complaints of nausea so far this shift   - Patient is oliguric, no urine output this shift. Plan for HD this afternoon.   - See flow sheet for complete assessment details

## 2018-12-12 NOTE — PT/OT/SLP PROGRESS
Occupational Therapy      Patient Name:  Femi Enciso   MRN:  [id number]    Patient not seen today secondary to Unavailable ( off floor for HD during PM attempt). Will follow-up as scheduled.    Julia ladd OT  12/12/2018

## 2018-12-12 NOTE — PROGRESS NOTES
Acute hemodialysis tx started via right chest perm cath, tolerated well, flows good. Pt informed of duration of tx and removal goal

## 2018-12-12 NOTE — ASSESSMENT & PLAN NOTE
- 53 year old male with history of ESLD 2/2 ETOH cirrhosis who is s/p liver transplant c/b take-back x 3 for bleeding most recently 11/18.  - LFTs now improving slowly.  T bili was 37.6 day of transplant.  - Pt remains with significant debility. Pt will need SNF placement when medically stable.   - Appetite slowly improving.  Tolerating supplements.  Encourage PO intake.   - Drain placed during take back. Drain placed to intra-abdominal abscess on 11/22.   - Fluid from collection noted with enterococcus VRE. Cont with antibxs per ID.  De escalated to PO on 11/29/18.    - Abdominal pain well controlled, and having BMs.    - HD per nephrology.   - Muscle relaxer started and will hold off on oxycodone for now.   - LFTs remain stable.

## 2018-12-12 NOTE — ASSESSMENT & PLAN NOTE
- See intra-abdominal abscess.  - wbc improving, cont w delirium.    - Repeat cx 12/4 with NGTD.  Completed Linezolid and Cefepime per ID.    - wbc trended up 12/11- repeat cx 12/11.  - WBC w/ improvement 12/12

## 2018-12-12 NOTE — ASSESSMENT & PLAN NOTE
- Intermittent, worse over past 10 days,  Changed Pepcid to Protonix 12/9/18.  Appetite seems to be slowly improving.    - Encourage multiple, small meals and cont PRN anti-emetics.  If no improvement, may need GI consult.    - GI symptoms now slowly improving.

## 2018-12-12 NOTE — ASSESSMENT & PLAN NOTE
- DEL since transplant.  Pt on Midodrine.  - Nephrology following for HD-  Pt cont to have hypotenstion worse w standing.  Midodrine 10 mg PRN for hypotension during HD ordered.   - BP since improved.  Midodrine discontinued.

## 2018-12-12 NOTE — PROGRESS NOTES
[facility]  Liver Transplant  Progress Note    Patient Name: Femi Enciso  MRN: [id number]  Admission Date: 10/27/2018  Hospital Length of Stay: 46 days  Code Status: Full Code  Primary Care Provider: Primary Doctor No  Post-Operative Day: 31    ORGAN:   LIVER  Disease Etiology: Alcoholic Cirrhosis  Donor Type:    - Brain Death  CDC High Risk:   No  Donor CMV Status:   Donor CMV Status: Positive  Donor HBcAB:   Negative  Donor HCV Status:   Negative  Donor HBV JAVIER: Negative  Donor HCV JAVIER: Negative  Whole or Partial: Whole Liver  Biliary Anastomosis: End to End  Arterial Anatomy: Standard  Subjective:     History of Present Illness:  Femi Enciso is a 53yr old male with PMH of acute ETOH hepatitis and DEL/ATN evolved to ESRD requiring HD (not candidate for KTX). He is now s/p liver transplant (SM induction, DBD, CMV D+/R-). Transplant surgery noted severe collateralization, adrenal gland firmly adhered to liver. Bare area of adrenal gland required several stitches and packing to obtain fair hemostasis (EBL 15L). OR take back  for bleeding from R adrenal bed in AM (significant clot in retroperitoneum, posterior to R hepatic lobe, tract posterior to the R kidney containing significant clot, small bleeding area of retroperitoneal fat) then returned to surgery same day for hemorrhaging closed with wound vac with plans to return to OR 24-48 hours for closure (retroperitoneal /retrorenal/retrocolic hematoma on the right ). Raw retroperitoneal bleeding. Suture ligatures placed, argon beam cautery, evarest placed in retroperitoneum behind cava and right kidney. Significant oozing from upper right adrenal gland, not amenable to ligation, that portion of the adrenal gland was resected with cautery). OR take back  for washout and incisional closure, no significant bleeding or hematoma found. OR cultures   from body fluid grew enterococcus faecium VRE. ID was consulted,  started on  daptomycin for VRE treatment and cefepime/flagyl to cover for IA ty in bile. Patient with significant leukocytosis with peak on 11/22 at 48K prompting drainage of R subphrenic fluid collection, perc drain placed, culture grew VRE. Stroke code called 11/21 for lethargy and unresponsive, CT head without evidence of acute ischemic changes and CTA chest negative, vascular neurology was consulted recommended MRI brain, but patient's AMS improved shortly after event. Nephrology consulted for ESRD requiring HD. Patient overall improved on antibiotic regimen, leukocytosis and AMS improved. He was transferred to T[facility] on POD #15.     Hospital Course:  Interval History:  No acute events overnight. Pt reports feeling well this am and moderate sleep overnight. Progressing well but remains severely debilitated. WBC now with improvement. Recent Abxs completed-  Cefepime completed 12/8/18.  Linezolid for VRE completed 12/09/18. Repeat blood and UA/urine cx 12/11 NGTD. AAOx3. He tolerated HD 12/10 w/ 2L removed. LFTs remain WNL. Back pain managed w Zanaflex. Transfused 1 u PRBC 12/10 w appropriate response.  Stool for occult blood neg. Continues to improve physically but slowly, will need SNF consult for placement. Monitor.         Scheduled Meds:   sodium chloride 0.9%   Intravenous Once    albuterol-ipratropium  3 mL Nebulization Q4H WAKE    aspirin  81 mg Oral Daily    bacitracin   Topical (Top) TID    ergocalciferol  50,000 Units Oral Q7 Days    heparin (porcine)  5,000 Units Subcutaneous Q8H    isavuconazonium sulfate  372 mg Oral Daily    lidocaine HCL 10 mg/ml (1%)  1 mL Intradermal Once    nystatin  500,000 Units Oral QID (WM & HS)    pantoprazole  40 mg Oral Daily    predniSONE  5 mg Oral Daily    Followed by    [START ON 12/17/2018] predniSONE  2.5 mg Oral Daily    sodium bicarbonate  650 mg Oral BID    sulfamethoxazole-trimethoprim 400-80mg  1 tablet Oral Every Mon, Wed, Fri    tacrolimus  2 mg Oral BID     traZODone  50 mg Oral QHS    ursodiol  300 mg Oral BID    valganciclovir 50 mg/ml  100 mg Oral Once per day on Mon Wed Fri     Continuous Infusions:  PRN Meds:acetaminophen, albuterol-ipratropium, artificial tears, bisacodyl, dextrose 50%, dextrose 50%, glucagon (human recombinant), glucose, glucose, heparin (porcine), insulin aspart U-100, midodrine, ondansetron, ramelteon, tiZANidine, traZODone    Review of Systems   Constitutional: Positive for activity change, appetite change (poor) and fatigue. Negative for fever.   HENT: Negative for facial swelling and voice change.    Eyes: Negative.    Respiratory: Positive for shortness of breath (DIXON). Negative for apnea, cough, choking, chest tightness and wheezing.    Cardiovascular: Negative.  Negative for chest pain, palpitations and leg swelling.   Gastrointestinal: Positive for abdominal distention. Negative for abdominal pain, constipation, diarrhea, nausea and vomiting.   Genitourinary: Positive for decreased urine volume. Negative for difficulty urinating, dysuria, hematuria and urgency.   Musculoskeletal: Negative.  Negative for back pain, gait problem, neck pain and neck stiffness.   Skin: Positive for wound. Negative for color change and pallor.   Allergic/Immunologic: Positive for immunocompromised state.   Neurological: Positive for weakness. Negative for dizziness, seizures, light-headedness and headaches.   Psychiatric/Behavioral: Negative for behavioral problems, confusion, decreased concentration, dysphoric mood, hallucinations, sleep disturbance and suicidal ideas. The patient is not nervous/anxious.      Objective:     Vital Signs (Most Recent):  Temp: 98.4 °F (36.9 °C) (12/12/18 1410)  Pulse: 94 (12/12/18 1500)  Resp: (!) 22 (12/12/18 1410)  BP: (!) 148/95 (12/12/18 1500)  SpO2: 98 % (12/12/18 1103) Vital Signs (24h Range):  Temp:  [98.4 °F (36.9 °C)-99 °F (37.2 °C)] 98.4 °F (36.9 °C)  Pulse:  [] 94  Resp:  [15-31] 22  SpO2:  [97 %-100 %]  98 %  BP: (114-154)/(70-98) 148/95     Weight: 75.9 kg (167 lb 5.3 oz)  Body mass index is 24.01 kg/m².    Intake/Output - Last 3 Shifts       12/10 0700 - 12/11 0659 12/11 0700 - 12/12 0659 12/12 0700 - 12/13 0659    P.O. 1025 900     I.V. (mL/kg)       Blood       Other 600      Total Intake(mL/kg) 1625 (21.6) 900 (11.9)     Urine (mL/kg/hr) 0 (0) 30 (0)     Emesis/NG output 0 0     Other 2600 0     Stool  0     Blood  0     Total Output 2600 30     Net -975 +870            Urine Occurrence 1 x 0 x     Stool Occurrence 1 x 0 x 0 x    Emesis Occurrence  0 x           Physical Exam   Constitutional: He is oriented to person, place, and time. He appears well-developed. No distress.   HENT:   Head: Normocephalic and atraumatic.   White drainage noted to back of throat and roof on mouth, voice hoarse   Eyes: Pupils are equal, round, and reactive to light. Scleral icterus is present.   Neck: Normal range of motion. Neck supple. No JVD present.   Cardiovascular: Normal rate, regular rhythm and normal heart sounds.   No murmur heard.  Pulmonary/Chest: Effort normal. No respiratory distress. He has decreased breath sounds in the right lower field and the left lower field. He has no wheezes. He exhibits no tenderness.   Taking short breaths, IS up to 500   Abdominal: Soft. Bowel sounds are normal. He exhibits no distension. There is tenderness.   Chevron inc ECTOR w/ staples  RLQ JOSE A drain and percutaneous drain site w suture CDI.    Musculoskeletal: Normal range of motion. He exhibits no tenderness. Edema: trace ankle edema.   Neurological: He is alert and oriented to person, place, and time. He has normal reflexes.   Skin: Skin is warm and dry. Capillary refill takes 2 to 3 seconds. He is not diaphoretic. No pallor.   Psychiatric: He has a normal mood and affect. His behavior is normal. Judgment and thought content normal. His affect is not labile. He is not slowed. Cognition and memory are not impaired.   Nursing note and  vitals reviewed.      Laboratory:  Immunosuppressants         Stop Route Frequency     tacrolimus capsule 2 mg      -- Oral 2 times daily        CBC:   Recent Labs   Lab 12/12/18  0716   WBC 8.75   RBC 2.91*   HGB 8.8*   HCT 27.4*      MCV 94   MCH 30.2   MCHC 32.1     CMP:   Recent Labs   Lab 12/12/18  0716   GLU 97   CALCIUM 8.7   ALBUMIN 2.5*   PROT 5.7*      K 3.8   CO2 24      BUN 29*   CREATININE 3.2*   ALKPHOS 159*   ALT 15   AST 12   BILITOT 2.0*     Coagulation:   Recent Labs   Lab 12/12/18  0716   INR 1.2     Tacrolimus Lvl   Date Value Ref Range Status   12/12/2018 21.3 (H) 5.0 - 15.0 ng/mL Final     Comment:     Testing performed by Liquid Chromatography-Tandem  Mass Spectrometry (LC-MS/MS).  This test was developed and its performance characteristics  determined by [facility], Department of Pathology  and Laboratory Medicine in a manner consistent with CLIA  requirements. It has not been cleared or approved by the [place]  Food and Drug Administration.  This test is used for clinical   purposes.  It should not be regarded as investigational or for  research.     12/11/2018 13.8 5.0 - 15.0 ng/mL Final     Comment:     Testing performed by Liquid Chromatography-Tandem  Mass Spectrometry (LC-MS/MS).  This test was developed and its performance characteristics  determined by [facility], Department of Pathology  and Laboratory Medicine in a manner consistent with CLIA  requirements. It has not been cleared or approved by the [place]  Food and Drug Administration.  This test is used for clinical   purposes.  It should not be regarded as investigational or for  research.     12/10/2018 7.2 5.0 - 15.0 ng/mL Final     Comment:     Testing performed by Liquid Chromatography-Tandem  Mass Spectrometry (LC-MS/MS).  This test was developed and its performance characteristics  determined by [facility], Department of Pathology  and Laboratory Medicine in a manner consistent  with CLIA  requirements. It has not been cleared or approved by the [place]  Food and Drug Administration.  This test is used for clinical   purposes.  It should not be regarded as investigational or for  research.     12/09/2018 6.1 5.0 - 15.0 ng/mL Final     Comment:     Testing performed by Liquid Chromatography-Tandem  Mass Spectrometry (LC-MS/MS).  This test was developed and its performance characteristics  determined by [facility], Department of Pathology  and Laboratory Medicine in a manner consistent with CLIA  requirements. It has not been cleared or approved by the [place]  Food and Drug Administration.  This test is used for clinical   purposes.  It should not be regarded as investigational or for  research.           Labs within the past 24 hours have been reviewed.    Diagnostic Results:  None    Assessment/Plan:     * S/P liver transplant    - 53 year old male with history of ESLD 2/2 ETOH cirrhosis who is s/p liver transplant c/b take-back x 3 for bleeding most recently 11/18.  - LFTs now improving slowly.  T bili was 37.6 day of transplant.  - Pt remains with significant debility. Pt will need SNF placement when medically stable.   - Appetite slowly improving.  Tolerating supplements.  Encourage PO intake.   - Drain placed during take back. Drain placed to intra-abdominal abscess on 11/22.   - Fluid from collection noted with enterococcus VRE. Cont with antibxs per ID.  De escalated to PO on 11/29/18.    - Abdominal pain well controlled, and having BMs.    - HD per nephrology.   - Muscle relaxer started and will hold off on oxycodone for now.   - LFTs remain stable.        Leucocytosis    - See intra-abdominal abscess.  - wbc improving, cont w delirium.    - Repeat cx 12/4 with NGTD.  Completed Linezolid and Cefepime per ID.    - wbc trended up 12/11- repeat cx 12/11.  - WBC w/ improvement 12/12       Intra-abdominal abscess    - Significant increase in WBC post-op.   - OR cultures from 11/18  noted with enterococcus VRE.   - Abdominal CT performed at that time with fluid collection and drain placed on 11/22 for drainage.   - Intra-abdominal abscess culture also with enterococcus VRE.   - ID consulted and pt placed on antibxs for treatment. Pt was on Cefepime, Daptomycin, and Fluconazole for treatment.    - Pt remains with RLQ drains (one JOSE A and one Percutaneous).  One JOSE A removed 11/28.  Perc drain in placed draining tan, clear drainage.  WBC slowly improving.   - Liver US on 11/26 reviewed.   - Abdominal CT performed 11/27 and reviewed. Fluid collection noted with improvement on CT.  ID following and reassured by findings.    - Dapto, Cefepime and Flagyl changed 11/30/18 to Linezolid 600 mg PO q 12 hr x 10 days per ID.     - added back cefepime after thora.  ID re consulted.  -Completed Zyvox x 10 days per ID thru 12/9/18. Monitor.      Weakness    - Pt remains significantly debilitated.   - PT/OT for strengthening.   - Currently will need SNF for placement and further rehabilitation.  Insurance does not cover Rehab.  - Pt remains severely debilitated and not strong enough for SNF at this time.   - Cont with strengthening and plan for SNF consult again next week.        Acute renal failure with acute tubular necrosis superimposed on stage 3 chronic kidney disease    - Renal function slow to recover post-op.   - Nephrology consulted for management.   - Cont with HD as per nephrology recs.  Apprec recs.    - HD on 11/27 w/ 2L off. Pt tolerated well.    - Lasix 160 mg challenge 11/28 w/ minimal output.    - HD 12/10- 2L removed- tolerated well.   - Strict I&Os.      Anemia of chronic disease    - H&H trending down. Transfused 1 unit of PRBC with HD- . Monitor with daily labs.   - Chest/Abd/pelvis CT 12/4- no fluid to be drainged per transplant surgeon.  No source of bleeding.     - last liver US 11/27. Evolving peritransplant hematomas with anechoic fluid collection adjacent to the right hepatic lobe.   Small volume perihepatic free fluid.     At risk for opportunistic infections    - Cont with bactrim and valcyte for protection against opportunistic infections.   - cont Isavuconazonium.     Long-term use of immunosuppressant medication    - Cont with prograf. Draw prograf level daily and adjust dose as needed to maintain a therapeutic level.        Prophylactic immunotherapy    - See long term immunosuppression.        Type 2 diabetes mellitus without complication    - Endocrine consulted for management. Appreciate recs.   - hypoglycemia 12/10 requiring D50.    - repeat cx 12/11.       Nausea    - Intermittent, worse over past 10 days,  Changed Pepcid to Protonix 12/9/18.  Appetite seems to be slowly improving.    - Encourage multiple, small meals and cont PRN anti-emetics.  If no improvement, may need GI consult.    - GI symptoms now slowly improving.        Behavior disturbance           Delirium    - Pt with intermittent confusion since transplant was improving slowly.   - Pt with some lethargy and confusion on 11/21. Head CT negative.   - AAOx3. More alert and awake on 11/27.   - AAOx4 on 11/29. Seroquel d/c'd 11/30/18.  Trazodone for insomnia ordered w PRN dose.    - Pt then with delirium again. re consulted psych.   - delirium could be d/t rising prograf.  AMS improved since holding Prograf.    - CT head 12/4 with no acute findings.   - Restarted Prograf 12/6- will need to monitor mental status closely.  - 12/10 delirium resolved.     Moderate malnutrition    - muscle wasting noted.  Appetite on and off.  Cont supplements.  Dietician following.  Apprec recs.    - encourage multiple, small meals.     Adrenal cortical steroids causing adverse effect in therapeutic use           Pleural effusion associated with hepatic disorder    - c/o increased pain w deep breath today to right side.  CXR showed increased opacity in the inferior hemothorax on the right side since 11/26/2018.  Pulse ox 97-99% on RA.  Cont to  monitor    - continues to have fluid to RLL.  WBC remains elevated.    - thoracentesis performed on 11/30. Fluid noted with 4k WBC, 93 SEGS. cx no growth to date.  Cefepime added for treatment.  - Chest CT showing right pleural effusion improved, left w increased pleural effusion.   - Per ID, completed treatment of empyema w Cefepime thru 12/8.  - sats remain 97-99% on RA.     DEL (acute kidney injury)    - DEL for over 2 weeks prior to transplant. Nephrology was consulted.     - Post transplant, Nephrology cont to follow.  He received HD last on 11/30 for metabolic clearance and fluid management.     - Attempted Lasix challenge (160 mg) x1 on 11/28- pt diuresed 105 cc.    - HD 12/3- removed 1.5 liter.  Cont strict I/O's.  Monitor closely.  - Lasix challenge 12/4 with minimal UOP  - Last HD 12/5- 2.3L removed.    - Crackles to mell bases and dependent edema.  Lasix 100mg and albumin x3 12/6/18.  - HD 12/10- 2L removed- tolerated well.     Acute renal failure    - DEL since transplant.  Pt on Midodrine.  - Nephrology following for HD-  Pt cont to have hypotenstion worse w standing.  Midodrine 10 mg PRN for hypotension during HD ordered.   - BP since improved.  Midodrine discontinued.           VTE Risk Mitigation (From admission, onward)        Ordered     heparin (porcine) injection 5,000 Units  Every 8 hours      11/30/18 1149     heparin (porcine) injection 1,000 Units  As needed (PRN)      11/25/18 1428     IP VTE HIGH RISK PATIENT  Once      11/11/18 1208          The patients clinical status was discussed at multidisplinary rounds, involving transplant surgery, transplant medicine, pharmacy, nursing, nutrition, and social work    Discharge Planning:  Not a candidate for d/c at this time.     Kevin Miranda, NP  Liver Transplant  [facility]

## 2018-12-12 NOTE — ASSESSMENT & PLAN NOTE
- Pt remains significantly debilitated.   - PT/OT for strengthening.   - Currently will need SNF for placement and further rehabilitation.  Insurance does not cover Rehab.  - Pt remains severely debilitated and not strong enough for SNF at this time.   - Cont with strengthening and plan for SNF consult again next week.

## 2018-12-12 NOTE — PROGRESS NOTES
Patient transferred to HD per stretcher by transport aide. No acute distress noted. Will monitor for return.

## 2018-12-12 NOTE — ASSESSMENT & PLAN NOTE
- Pt with intermittent confusion since transplant was improving slowly.   - Pt with some lethargy and confusion on 11/21. Head CT negative.   - AAOx3. More alert and awake on 11/27.   - AAOx4 on 11/29. Seroquel d/c'd 11/30/18.  Trazodone for insomnia ordered w PRN dose.    - Pt then with delirium again. re consulted psych.   - delirium could be d/t rising prograf.  AMS improved since holding Prograf.    - CT head 12/4 with no acute findings.   - Restarted Prograf 12/6- will need to monitor mental status closely.  - 12/10 delirium resolved.
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
## 2018-12-12 NOTE — PROGRESS NOTES
VITALIY met with pt, pt's spouse Suzy Enciso, and pt's mother in room to f/u for continuity of care prior to TSU rounds this morning.  Pt found seated upright in recliner at bedside and presented a&ox4.  Pt is glad to have been able to be advanced to solid diet.  Pt stated he is tired of being in the same hospital room and would like to be taken out of his room to other parts of the hospital for a change of scenery.  Pt and spouse stated plans of discussing with TSU team during rounds.  SW provided reflective listening, normalization, and validation.  Pt denies any coping issues or mental health difficulties at this time.  Suzy and pt's mother also deny any coping issues at this time.  Suzy also reported wanting pt to be transferred to SNF as soon as possible to have a change of environment.  Suzy currently stays with pt in room during the day and pt's mother stays with pt overnight so each can rest.  Suzy decided to move into the [facility] ([address], [place] [address]) - [address] - Room [address].  Suzy anticipates this being the likely lodging option for pt when he is ready to d/c to the community.  Suzy reports patient's daughter Ann and grandchildren will be traveling into town next week to spend time with pt during the Christmas holiday.  Suzy also plans to ask rounding TSU team about any particular precautions that need to be taken with pt's grandchildren visiting.  Suzy reports all grandchildren are up-to-date on vaccinations.  Suzy confirmed plans of returning to work in [place] on January 14 and will have Ann travel into town to provide caregiver support to pt.  Pt, Suzy, and pt's mother deny having any additional questions or psychosocial concerns at this time.  VITALIY updated pt and spouse on outpatient HD referral sent to [facility] central intake and awaiting confirmation for HD schedule set up.SW remains available for further psychosocial support and d/c planning  assistance and stated plans of f/u as needed.  SW called and left message with [facility] coordinator Maggy ( [phone], [phone]. [phone]) requesting call back to receive update on status of HD referral.

## 2018-12-12 NOTE — SUBJECTIVE & OBJECTIVE
Scheduled Meds:   sodium chloride 0.9%   Intravenous Once    albuterol-ipratropium  3 mL Nebulization Q4H WAKE    aspirin  81 mg Oral Daily    bacitracin   Topical (Top) TID    ergocalciferol  50,000 Units Oral Q7 Days    heparin (porcine)  5,000 Units Subcutaneous Q8H    isavuconazonium sulfate  372 mg Oral Daily    lidocaine HCL 10 mg/ml (1%)  1 mL Intradermal Once    nystatin  500,000 Units Oral QID (WM & HS)    pantoprazole  40 mg Oral Daily    predniSONE  5 mg Oral Daily    Followed by    [START ON 12/17/2018] predniSONE  2.5 mg Oral Daily    sodium bicarbonate  650 mg Oral BID    sulfamethoxazole-trimethoprim 400-80mg  1 tablet Oral Every Mon, Wed, Fri    tacrolimus  2 mg Oral BID    traZODone  50 mg Oral QHS    ursodiol  300 mg Oral BID    valganciclovir 50 mg/ml  100 mg Oral Once per day on Mon Wed Fri     Continuous Infusions:  PRN Meds:acetaminophen, albuterol-ipratropium, artificial tears, bisacodyl, dextrose 50%, dextrose 50%, glucagon (human recombinant), glucose, glucose, heparin (porcine), insulin aspart U-100, midodrine, ondansetron, ramelteon, tiZANidine, traZODone    Review of Systems   Constitutional: Positive for activity change, appetite change (poor) and fatigue. Negative for fever.   HENT: Negative for facial swelling and voice change.    Eyes: Negative.    Respiratory: Positive for shortness of breath (DIXON). Negative for apnea, cough, choking, chest tightness and wheezing.    Cardiovascular: Negative.  Negative for chest pain, palpitations and leg swelling.   Gastrointestinal: Positive for abdominal distention. Negative for abdominal pain, constipation, diarrhea, nausea and vomiting.   Genitourinary: Positive for decreased urine volume. Negative for difficulty urinating, dysuria, hematuria and urgency.   Musculoskeletal: Negative.  Negative for back pain, gait problem, neck pain and neck stiffness.   Skin: Positive for wound. Negative for color change and pallor.    Allergic/Immunologic: Positive for immunocompromised state.   Neurological: Positive for weakness. Negative for dizziness, seizures, light-headedness and headaches.   Psychiatric/Behavioral: Negative for behavioral problems, confusion, decreased concentration, dysphoric mood, hallucinations, sleep disturbance and suicidal ideas. The patient is not nervous/anxious.      Objective:     Vital Signs (Most Recent):  Temp: 98.4 °F (36.9 °C) (12/12/18 1410)  Pulse: 94 (12/12/18 1500)  Resp: (!) 22 (12/12/18 1410)  BP: (!) 148/95 (12/12/18 1500)  SpO2: 98 % (12/12/18 1103) Vital Signs (24h Range):  Temp:  [98.4 °F (36.9 °C)-99 °F (37.2 °C)] 98.4 °F (36.9 °C)  Pulse:  [] 94  Resp:  [15-31] 22  SpO2:  [97 %-100 %] 98 %  BP: (114-154)/(70-98) 148/95     Weight: 75.9 kg (167 lb 5.3 oz)  Body mass index is 24.01 kg/m².    Intake/Output - Last 3 Shifts       12/10 0700 - 12/11 0659 12/11 0700 - 12/12 0659 12/12 0700 - 12/13 0659    P.O. 1025 900     I.V. (mL/kg)       Blood       Other 600      Total Intake(mL/kg) 1625 (21.6) 900 (11.9)     Urine (mL/kg/hr) 0 (0) 30 (0)     Emesis/NG output 0 0     Other 2600 0     Stool  0     Blood  0     Total Output 2600 30     Net -975 +870            Urine Occurrence 1 x 0 x     Stool Occurrence 1 x 0 x 0 x    Emesis Occurrence  0 x           Physical Exam   Constitutional: He is oriented to person, place, and time. He appears well-developed. No distress.   HENT:   Head: Normocephalic and atraumatic.   White drainage noted to back of throat and roof on mouth, voice hoarse   Eyes: Pupils are equal, round, and reactive to light. Scleral icterus is present.   Neck: Normal range of motion. Neck supple. No JVD present.   Cardiovascular: Normal rate, regular rhythm and normal heart sounds.   No murmur heard.  Pulmonary/Chest: Effort normal. No respiratory distress. He has decreased breath sounds in the right lower field and the left lower field. He has no wheezes. He exhibits no  tenderness.   Taking short breaths, IS up to 500   Abdominal: Soft. Bowel sounds are normal. He exhibits no distension. There is tenderness.   Chevron inc ECTOR w/ staples  RLQ JOSE A drain and percutaneous drain site w suture CDI.    Musculoskeletal: Normal range of motion. He exhibits no tenderness. Edema: trace ankle edema.   Neurological: He is alert and oriented to person, place, and time. He has normal reflexes.   Skin: Skin is warm and dry. Capillary refill takes 2 to 3 seconds. He is not diaphoretic. No pallor.   Psychiatric: He has a normal mood and affect. His behavior is normal. Judgment and thought content normal. His affect is not labile. He is not slowed. Cognition and memory are not impaired.   Nursing note and vitals reviewed.      Laboratory:  Immunosuppressants         Stop Route Frequency     tacrolimus capsule 2 mg      -- Oral 2 times daily        CBC:   Recent Labs   Lab 12/12/18  0716   WBC 8.75   RBC 2.91*   HGB 8.8*   HCT 27.4*      MCV 94   MCH 30.2   MCHC 32.1     CMP:   Recent Labs   Lab 12/12/18  0716   GLU 97   CALCIUM 8.7   ALBUMIN 2.5*   PROT 5.7*      K 3.8   CO2 24      BUN 29*   CREATININE 3.2*   ALKPHOS 159*   ALT 15   AST 12   BILITOT 2.0*     Coagulation:   Recent Labs   Lab 12/12/18  0716   INR 1.2     Tacrolimus Lvl   Date Value Ref Range Status   12/12/2018 21.3 (H) 5.0 - 15.0 ng/mL Final     Comment:     Testing performed by Liquid Chromatography-Tandem  Mass Spectrometry (LC-MS/MS).  This test was developed and its performance characteristics  determined by [facility], Department of Pathology  and Laboratory Medicine in a manner consistent with CLIA  requirements. It has not been cleared or approved by the [place]  Food and Drug Administration.  This test is used for clinical   purposes.  It should not be regarded as investigational or for  research.     12/11/2018 13.8 5.0 - 15.0 ng/mL Final     Comment:     Testing performed by Liquid  Chromatography-Tandem  Mass Spectrometry (LC-MS/MS).  This test was developed and its performance characteristics  determined by [facility], Department of Pathology  and Laboratory Medicine in a manner consistent with CLIA  requirements. It has not been cleared or approved by the [place]  Food and Drug Administration.  This test is used for clinical   purposes.  It should not be regarded as investigational or for  research.     12/10/2018 7.2 5.0 - 15.0 ng/mL Final     Comment:     Testing performed by Liquid Chromatography-Tandem  Mass Spectrometry (LC-MS/MS).  This test was developed and its performance characteristics  determined by [facility], Department of Pathology  and Laboratory Medicine in a manner consistent with CLIA  requirements. It has not been cleared or approved by the [place]  Food and Drug Administration.  This test is used for clinical   purposes.  It should not be regarded as investigational or for  research.     12/09/2018 6.1 5.0 - 15.0 ng/mL Final     Comment:     Testing performed by Liquid Chromatography-Tandem  Mass Spectrometry (LC-MS/MS).  This test was developed and its performance characteristics  determined by [facility], Department of Pathology  and Laboratory Medicine in a manner consistent with CLIA  requirements. It has not been cleared or approved by the [place]  Food and Drug Administration.  This test is used for clinical   purposes.  It should not be regarded as investigational or for  research.           Labs within the past 24 hours have been reviewed.    Diagnostic Results:  None

## 2018-12-13 PROBLEM — F41.9 ANXIETY: Status: ACTIVE | Noted: 2018-12-10

## 2018-12-13 LAB
ALBUMIN SERPL BCP-MCNC: 2.2 G/DL
ALP SERPL-CCNC: 157 U/L
ALT SERPL W/O P-5'-P-CCNC: 15 U/L
ANION GAP SERPL CALC-SCNC: 8 MMOL/L
AST SERPL-CCNC: 11 U/L
BACTERIA UR CULT: NO GROWTH
BASOPHILS # BLD AUTO: 0.13 K/UL
BASOPHILS NFR BLD: 1.3 %
BILIRUB SERPL-MCNC: 1.9 MG/DL
BUN SERPL-MCNC: 14 MG/DL
CALCIUM SERPL-MCNC: 8.4 MG/DL
CHLORIDE SERPL-SCNC: 108 MMOL/L
CO2 SERPL-SCNC: 24 MMOL/L
CREAT SERPL-MCNC: 2.1 MG/DL
DIFFERENTIAL METHOD: ABNORMAL
EOSINOPHIL # BLD AUTO: 0.1 K/UL
EOSINOPHIL NFR BLD: 1.4 %
ERYTHROCYTE [DISTWIDTH] IN BLOOD BY AUTOMATED COUNT: 15.9 %
EST. GFR  (AFRICAN AMERICAN): 40.3 ML/MIN/1.73 M^2
EST. GFR  (NON AFRICAN AMERICAN): 34.9 ML/MIN/1.73 M^2
GLUCOSE SERPL-MCNC: 133 MG/DL
HCT VFR BLD AUTO: 27.1 %
HGB BLD-MCNC: 8.9 G/DL
IMM GRANULOCYTES # BLD AUTO: 0.06 K/UL
IMM GRANULOCYTES NFR BLD AUTO: 0.6 %
INR PPP: 1.2
LYMPHOCYTES # BLD AUTO: 0.7 K/UL
LYMPHOCYTES NFR BLD: 6.5 %
M TB IFN-G CD4+ BCKGRND COR BLD-ACNC: 0 IU/ML
MAGNESIUM SERPL-MCNC: 1.6 MG/DL
MCH RBC QN AUTO: 29.9 PG
MCHC RBC AUTO-ENTMCNC: 32.8 G/DL
MCV RBC AUTO: 91 FL
MITOGEN IGNF BCKGRD COR BLD-ACNC: 0.1 IU/ML
MITOGEN IGNF BCKGRD COR BLD-ACNC: ABNORMAL [IU]/ML
MONOCYTES # BLD AUTO: 1.1 K/UL
MONOCYTES NFR BLD: 10.9 %
NEUTROPHILS # BLD AUTO: 7.9 K/UL
NEUTROPHILS NFR BLD: 79.3 %
NIL: 0.02 IU/ML
NRBC BLD-RTO: 0 /100 WBC
PHOSPHATE SERPL-MCNC: 2.1 MG/DL
PLATELET # BLD AUTO: 326 K/UL
PMV BLD AUTO: 10.8 FL
POCT GLUCOSE: 103 MG/DL (ref 70–110)
POCT GLUCOSE: 104 MG/DL (ref 70–110)
POCT GLUCOSE: 111 MG/DL (ref 70–110)
POTASSIUM SERPL-SCNC: 3.7 MMOL/L
PROT SERPL-MCNC: 5.5 G/DL
PROTHROMBIN TIME: 12.2 SEC
RBC # BLD AUTO: 2.98 M/UL
SODIUM SERPL-SCNC: 140 MMOL/L
TACROLIMUS BLD-MCNC: 8.9 NG/ML
TB2 - NIL: 0 IU/ML
WBC # BLD AUTO: 9.97 K/UL

## 2018-12-13 PROCEDURE — 94664 DEMO&/EVAL PT USE INHALER: CPT

## 2018-12-13 PROCEDURE — 25000003 PHARM REV CODE 250: Performed by: NURSE PRACTITIONER

## 2018-12-13 PROCEDURE — 94640 AIRWAY INHALATION TREATMENT: CPT

## 2018-12-13 PROCEDURE — 99233 SBSQ HOSP IP/OBS HIGH 50: CPT | Mod: 24,,, | Performed by: NURSE PRACTITIONER

## 2018-12-13 PROCEDURE — 97530 THERAPEUTIC ACTIVITIES: CPT

## 2018-12-13 PROCEDURE — 84100 ASSAY OF PHOSPHORUS: CPT

## 2018-12-13 PROCEDURE — 97110 THERAPEUTIC EXERCISES: CPT

## 2018-12-13 PROCEDURE — 99232 SBSQ HOSP IP/OBS MODERATE 35: CPT | Mod: ,,, | Performed by: PSYCHIATRY & NEUROLOGY

## 2018-12-13 PROCEDURE — 83735 ASSAY OF MAGNESIUM: CPT

## 2018-12-13 PROCEDURE — 20600001 HC STEP DOWN PRIVATE ROOM

## 2018-12-13 PROCEDURE — 80197 ASSAY OF TACROLIMUS: CPT

## 2018-12-13 PROCEDURE — 99233 PR SUBSEQUENT HOSPITAL CARE,LEVL III: ICD-10-PCS | Mod: 24,,, | Performed by: NURSE PRACTITIONER

## 2018-12-13 PROCEDURE — 85025 COMPLETE CBC W/AUTO DIFF WBC: CPT

## 2018-12-13 PROCEDURE — 63600175 PHARM REV CODE 636 W HCPCS: Performed by: STUDENT IN AN ORGANIZED HEALTH CARE EDUCATION/TRAINING PROGRAM

## 2018-12-13 PROCEDURE — 25000003 PHARM REV CODE 250: Performed by: STUDENT IN AN ORGANIZED HEALTH CARE EDUCATION/TRAINING PROGRAM

## 2018-12-13 PROCEDURE — 36415 COLL VENOUS BLD VENIPUNCTURE: CPT

## 2018-12-13 PROCEDURE — 99900035 HC TECH TIME PER 15 MIN (STAT)

## 2018-12-13 PROCEDURE — 85610 PROTHROMBIN TIME: CPT

## 2018-12-13 PROCEDURE — 99232 PR SUBSEQUENT HOSPITAL CARE,LEVL II: ICD-10-PCS | Mod: ,,, | Performed by: PSYCHIATRY & NEUROLOGY

## 2018-12-13 PROCEDURE — 63600175 PHARM REV CODE 636 W HCPCS: Performed by: NURSE PRACTITIONER

## 2018-12-13 PROCEDURE — 25000242 PHARM REV CODE 250 ALT 637 W/ HCPCS: Performed by: NURSE PRACTITIONER

## 2018-12-13 PROCEDURE — 80053 COMPREHEN METABOLIC PANEL: CPT

## 2018-12-13 RX ORDER — ESCITALOPRAM OXALATE 10 MG/1
10 TABLET ORAL NIGHTLY
Status: DISCONTINUED | OUTPATIENT
Start: 2018-12-13 | End: 2018-12-19

## 2018-12-13 RX ORDER — LIDOCAINE 50 MG/G
1 PATCH TOPICAL
Status: DISCONTINUED | OUTPATIENT
Start: 2018-12-13 | End: 2019-01-08 | Stop reason: HOSPADM

## 2018-12-13 RX ORDER — SODIUM CHLORIDE 9 MG/ML
INJECTION, SOLUTION INTRAVENOUS ONCE
Status: COMPLETED | OUTPATIENT
Start: 2018-12-13 | End: 2018-12-14

## 2018-12-13 RX ADMIN — ACETAMINOPHEN 650 MG: 325 TABLET, FILM COATED ORAL at 03:12

## 2018-12-13 RX ADMIN — IPRATROPIUM BROMIDE AND ALBUTEROL SULFATE 3 ML: .5; 3 SOLUTION RESPIRATORY (INHALATION) at 08:12

## 2018-12-13 RX ADMIN — ASPIRIN 81 MG CHEWABLE TABLET 81 MG: 81 TABLET CHEWABLE at 08:12

## 2018-12-13 RX ADMIN — HEPARIN SODIUM 5000 UNITS: 5000 INJECTION, SOLUTION INTRAVENOUS; SUBCUTANEOUS at 05:12

## 2018-12-13 RX ADMIN — TACROLIMUS 2 MG: 1 CAPSULE ORAL at 08:12

## 2018-12-13 RX ADMIN — HEPARIN SODIUM 5000 UNITS: 5000 INJECTION, SOLUTION INTRAVENOUS; SUBCUTANEOUS at 01:12

## 2018-12-13 RX ADMIN — NYSTATIN 500000 UNITS: 500000 SUSPENSION ORAL at 08:12

## 2018-12-13 RX ADMIN — SODIUM BICARBONATE 650 MG TABLET 650 MG: at 08:12

## 2018-12-13 RX ADMIN — ERGOCALCIFEROL 50000 UNITS: 1.25 CAPSULE ORAL at 08:12

## 2018-12-13 RX ADMIN — ISAVUCONAZONIUM SULFATE 372 MG: 186 CAPSULE ORAL at 08:12

## 2018-12-13 RX ADMIN — ESCITALOPRAM OXALATE 10 MG: 10 TABLET ORAL at 09:12

## 2018-12-13 RX ADMIN — BACITRACIN: 500 OINTMENT TOPICAL at 08:12

## 2018-12-13 RX ADMIN — HEPARIN SODIUM 5000 UNITS: 5000 INJECTION, SOLUTION INTRAVENOUS; SUBCUTANEOUS at 09:12

## 2018-12-13 RX ADMIN — TACROLIMUS 2 MG: 1 CAPSULE ORAL at 05:12

## 2018-12-13 RX ADMIN — IPRATROPIUM BROMIDE AND ALBUTEROL SULFATE 3 ML: .5; 3 SOLUTION RESPIRATORY (INHALATION) at 12:12

## 2018-12-13 RX ADMIN — PREDNISONE 5 MG: 5 TABLET ORAL at 08:12

## 2018-12-13 RX ADMIN — TRAZODONE HYDROCHLORIDE 50 MG: 50 TABLET ORAL at 09:12

## 2018-12-13 RX ADMIN — BACITRACIN: 500 OINTMENT TOPICAL at 01:12

## 2018-12-13 RX ADMIN — LIDOCAINE 1 PATCH: 50 PATCH CUTANEOUS at 03:12

## 2018-12-13 RX ADMIN — IPRATROPIUM BROMIDE AND ALBUTEROL SULFATE 3 ML: .5; 3 SOLUTION RESPIRATORY (INHALATION) at 04:12

## 2018-12-13 RX ADMIN — PANTOPRAZOLE SODIUM 40 MG: 40 TABLET, DELAYED RELEASE ORAL at 08:12

## 2018-12-13 RX ADMIN — ACETAMINOPHEN 650 MG: 325 TABLET, FILM COATED ORAL at 11:12

## 2018-12-13 RX ADMIN — BACITRACIN: 500 OINTMENT TOPICAL at 09:12

## 2018-12-13 RX ADMIN — URSODIOL 300 MG: 300 CAPSULE ORAL at 09:12

## 2018-12-13 RX ADMIN — URSODIOL 300 MG: 300 CAPSULE ORAL at 08:12

## 2018-12-13 NOTE — PLAN OF CARE
Problem: Fall Risk (Adult)  Goal: Identify Related Risk Factors and Signs and Symptoms  Related risk factors and signs and symptoms are identified upon initiation of [employer] Clinical Practice Guideline (CPG)  Outcome: Ongoing (interventions implemented as appropriate)  -Pt oob with 2 person assist. Mother @ bedside attentive to pt needs.   -Pt free from falls/injuries thus far this shift.   -Bed in lowest, locked position. Bed rails up x3. Call light and personal belongings within reach.     Problem: Patient Care Overview  Goal: Plan of Care Review  Outcome: Ongoing (interventions implemented as appropriate)  -AAOx4  -DANIE ML saline locked. Dressing CDI.   -RSQ perm cath used for HD. Dressing CDI.   -Chevron incision raji with steri-strips cdi.   -RQ JOSE A site red. Bacitracin ointment placed TID.   -Zanaflex given 1x for pain.   -HD MWF- 2L removed today.   -WBC now trending down. Pt completed Cefepime course . Blood cx NGTD. UA positive for RBC/WBC/bacteria. Urine culture pending.   -Awaiting for insurance approval for pt to be d/c to SNF.   -No other issues thus far this shift.   -Will continue to monitor.

## 2018-12-13 NOTE — PROGRESS NOTES
[facility]  Liver Transplant  Progress Note    Patient Name: Femi Enciso  MRN: [id number]  Admission Date: 10/27/2018  Hospital Length of Stay: 47 days  Code Status: Full Code  Primary Care Provider: Primary Doctor No  Post-Operative Day: 32    ORGAN:   LIVER  Disease Etiology: Alcoholic Cirrhosis  Donor Type:    - Brain Death  CDC High Risk:   No  Donor CMV Status:   Donor CMV Status: Positive  Donor HBcAB:   Negative  Donor HCV Status:   Negative  Donor HBV JAVIER: Negative  Donor HCV JAVIER: Negative  Whole or Partial: Whole Liver  Biliary Anastomosis: End to End  Arterial Anatomy: Standard  Subjective:     History of Present Illness:  Femi Enciso is a 53yr old male with PMH of acute ETOH hepatitis and DEL/ATN evolved to ESRD requiring HD (not candidate for KTX). He is now s/p liver transplant (SM induction, DBD, CMV D+/R-). Transplant surgery noted severe collateralization, adrenal gland firmly adhered to liver. Bare area of adrenal gland required several stitches and packing to obtain fair hemostasis (EBL 15L). OR take back  for bleeding from R adrenal bed in AM (significant clot in retroperitoneum, posterior to R hepatic lobe, tract posterior to the R kidney containing significant clot, small bleeding area of retroperitoneal fat) then returned to surgery same day for hemorrhaging closed with wound vac with plans to return to OR 24-48 hours for closure (retroperitoneal /retrorenal/retrocolic hematoma on the right ). Raw retroperitoneal bleeding. Suture ligatures placed, argon beam cautery, evarest placed in retroperitoneum behind cava and right kidney. Significant oozing from upper right adrenal gland, not amenable to ligation, that portion of the adrenal gland was resected with cautery). OR take back  for washout and incisional closure, no significant bleeding or hematoma found. OR cultures   from body fluid grew enterococcus faecium VRE. ID was consulted,  started on  daptomycin for VRE treatment and cefepime/flagyl to cover for IA ty in bile. Patient with significant leukocytosis with peak on 11/22 at 48K prompting drainage of R subphrenic fluid collection, perc drain placed, culture grew VRE. Stroke code called 11/21 for lethargy and unresponsive, CT head without evidence of acute ischemic changes and CTA chest negative, vascular neurology was consulted recommended MRI brain, but patient's AMS improved shortly after event. Nephrology consulted for ESRD requiring HD. Patient overall improved on antibiotic regimen, leukocytosis and AMS improved. He was transferred to [facility] on POD #15.     Hospital Course:  Interval History:  No acute events overnight. Pt reports feeling well this am. Progressing well but remains severely debilitated. WBC now with improvement. Recent Abxs completed-  Cefepime completed 12/8/18.  Linezolid for VRE completed 12/09/18. Repeat blood and UA/urine cx 12/11 NGTD. AAOx3. He tolerated HD 12/10 w/ 2L removed. LFTs remain WNL. Back pain managed w Zanaflex. Transfused 1 u PRBC 12/10 w appropriate response.  Stool for occult blood neg. Continues to improve physically but slowly, will need SNF consult for placement early next week. Started Lexapro as per psych recs. Monitor.         Scheduled Meds:   albuterol-ipratropium  3 mL Nebulization Q4H WAKE    aspirin  81 mg Oral Daily    bacitracin   Topical (Top) TID    ergocalciferol  50,000 Units Oral Q7 Days    escitalopram oxalate  10 mg Oral Nightly    heparin (porcine)  5,000 Units Subcutaneous Q8H    isavuconazonium sulfate  372 mg Oral Daily    lidocaine  1 patch Transdermal Q24H    pantoprazole  40 mg Oral Daily    predniSONE  5 mg Oral Daily    Followed by    [START ON 12/17/2018] predniSONE  2.5 mg Oral Daily    sulfamethoxazole-trimethoprim 400-80mg  1 tablet Oral Every Mon, Wed, Fri    tacrolimus  2 mg Oral BID    traZODone  50 mg Oral QHS    ursodiol  300 mg Oral BID    valganciclovir  50 mg/ml  100 mg Oral Once per day on Mon Wed Fri     Continuous Infusions:  PRN Meds:acetaminophen, albuterol-ipratropium, artificial tears, bisacodyl, dextrose 50%, dextrose 50%, glucagon (human recombinant), glucose, glucose, heparin (porcine), insulin aspart U-100, midodrine, ondansetron, ramelteon, tiZANidine, traZODone    Review of Systems   Constitutional: Positive for activity change, appetite change (poor) and fatigue. Negative for fever.   HENT: Negative for facial swelling and voice change.    Eyes: Negative.    Respiratory: Positive for shortness of breath (DIXON). Negative for apnea, cough, choking, chest tightness and wheezing.    Cardiovascular: Negative.  Negative for chest pain, palpitations and leg swelling.   Gastrointestinal: Positive for abdominal distention. Negative for abdominal pain, constipation, diarrhea, nausea and vomiting.   Genitourinary: Positive for decreased urine volume. Negative for difficulty urinating, dysuria, hematuria and urgency.   Musculoskeletal: Negative.  Negative for back pain, gait problem, neck pain and neck stiffness.   Skin: Positive for wound. Negative for color change and pallor.   Allergic/Immunologic: Positive for immunocompromised state.   Neurological: Positive for weakness. Negative for dizziness, seizures, light-headedness and headaches.   Psychiatric/Behavioral: Negative for behavioral problems, confusion, decreased concentration, dysphoric mood, hallucinations, sleep disturbance and suicidal ideas. The patient is not nervous/anxious.      Objective:     Vital Signs (Most Recent):  Temp: 98.5 °F (36.9 °C) (12/13/18 1152)  Pulse: 98 (12/13/18 1202)  Resp: (!) 26 (12/13/18 1202)  BP: (!) 142/89 (12/13/18 1140)  SpO2: 97 % (12/13/18 1202) Vital Signs (24h Range):  Temp:  [97.8 °F (36.6 °C)-98.5 °F (36.9 °C)] 98.5 °F (36.9 °C)  Pulse:  [] 98  Resp:  [16-29] 26  SpO2:  [96 %-100 %] 97 %  BP: (111-156)/(59-98) 142/89     Weight: 70 kg (154 lb 5.2 oz)  Body  mass index is 22.14 kg/m².    Intake/Output - Last 3 Shifts       12/11 0700 - 12/12 0659 12/12 0700 - 12/13 0659 12/13 0700 - 12/14 0659    P.O. 900 480 120    Other  600     Total Intake(mL/kg) 900 (11.9) 1080 (15.4) 120 (1.7)    Urine (mL/kg/hr) 30 (0) 25 (0) 20 (0)    Emesis/NG output 0 0 0    Other 0 2600 0    Stool 0 0 1    Blood 0 0 0    Total Output 30 2625 21    Net +870 -1545 +99           Urine Occurrence 0 x 0 x     Stool Occurrence 0 x 0 x     Emesis Occurrence 0 x            Physical Exam   Constitutional: He is oriented to person, place, and time. He appears well-developed. No distress.   HENT:   Head: Normocephalic and atraumatic.   White drainage noted to back of throat and roof on mouth, voice hoarse   Eyes: Pupils are equal, round, and reactive to light. Scleral icterus is present.   Neck: Normal range of motion. Neck supple. No JVD present.   Cardiovascular: Normal rate, regular rhythm and normal heart sounds.   No murmur heard.  Pulmonary/Chest: Effort normal. No respiratory distress. He has decreased breath sounds in the right lower field and the left lower field. He has no wheezes. He exhibits no tenderness.   Taking short breaths, IS up to 500   Abdominal: Soft. Bowel sounds are normal. He exhibits no distension. There is tenderness.   Chevron inc ECTOR w/ staples  RLQ JOSE A drain and percutaneous drain site w suture CDI.    Musculoskeletal: Normal range of motion. He exhibits no tenderness. Edema: trace ankle edema.   Neurological: He is alert and oriented to person, place, and time. He has normal reflexes.   Skin: Skin is warm and dry. Capillary refill takes 2 to 3 seconds. He is not diaphoretic. No pallor.   Psychiatric: He has a normal mood and affect. His behavior is normal. Judgment and thought content normal. His affect is not labile. He is not slowed. Cognition and memory are not impaired.   Nursing note and vitals reviewed.      Laboratory:  Immunosuppressants         Stop Route Frequency      tacrolimus capsule 2 mg      -- Oral 2 times daily        CBC:   Recent Labs   Lab 12/13/18  0702   WBC 9.97   RBC 2.98*   HGB 8.9*   HCT 27.1*      MCV 91   MCH 29.9   MCHC 32.8     CMP:   Recent Labs   Lab 12/13/18  0702   *   CALCIUM 8.4*   ALBUMIN 2.2*   PROT 5.5*      K 3.7   CO2 24      BUN 14   CREATININE 2.1*   ALKPHOS 157*   ALT 15   AST 11   BILITOT 1.9*     Coagulation:   Recent Labs   Lab 12/13/18  0702   INR 1.2     Tacrolimus Lvl   Date Value Ref Range Status   12/13/2018 8.9 5.0 - 15.0 ng/mL Final     Comment:     Testing performed by Liquid Chromatography-Tandem  Mass Spectrometry (LC-MS/MS).  This test was developed and its performance characteristics  determined by [facility], Department of Pathology  and Laboratory Medicine in a manner consistent with CLIA  requirements. It has not been cleared or approved by the [place]  Food and Drug Administration.  This test is used for clinical   purposes.  It should not be regarded as investigational or for  research.     12/12/2018 21.3 (H) 5.0 - 15.0 ng/mL Final     Comment:     Testing performed by Liquid Chromatography-Tandem  Mass Spectrometry (LC-MS/MS).  This test was developed and its performance characteristics  determined by [facility], Department of Pathology  and Laboratory Medicine in a manner consistent with CLIA  requirements. It has not been cleared or approved by the [place]  Food and Drug Administration.  This test is used for clinical   purposes.  It should not be regarded as investigational or for  research.     12/11/2018 13.8 5.0 - 15.0 ng/mL Final     Comment:     Testing performed by Liquid Chromatography-Tandem  Mass Spectrometry (LC-MS/MS).  This test was developed and its performance characteristics  determined by [facility], Department of Pathology  and Laboratory Medicine in a manner consistent with CLIA  requirements. It has not been cleared or approved by the [place]  Food and  Drug Administration.  This test is used for clinical   purposes.  It should not be regarded as investigational or for  research.     12/10/2018 7.2 5.0 - 15.0 ng/mL Final     Comment:     Testing performed by Liquid Chromatography-Tandem  Mass Spectrometry (LC-MS/MS).  This test was developed and its performance characteristics  determined by [facility], Department of Pathology  and Laboratory Medicine in a manner consistent with CLIA  requirements. It has not been cleared or approved by the [place]  Food and Drug Administration.  This test is used for clinical   purposes.  It should not be regarded as investigational or for  research.           Labs within the past 24 hours have been reviewed.    Diagnostic Results:  None    Assessment/Plan:     * S/P liver transplant    - 53 year old male with history of ESLD 2/2 ETOH cirrhosis who is s/p liver transplant c/b take-back x 3 for bleeding most recently 11/18.  - LFTs now improving slowly.  T bili was 37.6 day of transplant.  - Pt remains with significant debility. Pt will need SNF placement when medically stable.   - Appetite slowly improving.  Tolerating supplements.  Encourage PO intake.   - Drain placed during take back. Drain placed to intra-abdominal abscess on 11/22.   - Fluid from collection noted with enterococcus VRE. Cont with antibxs per ID.  De escalated to PO on 11/29/18.    - Abdominal pain well controlled, and having BMs.    - HD per nephrology.   - Muscle relaxer started and will hold off on oxycodone for now.   - LFTs remain stable.        Leucocytosis    - See intra-abdominal abscess.  - wbc improving, cont w delirium.    - Repeat cx 12/4 with NGTD.  Completed Linezolid and Cefepime per ID.    - wbc trended up 12/11- repeat cx 12/11.  - WBC w/ improvement 12/12       Intra-abdominal abscess    - Significant increase in WBC post-op.   - OR cultures from 11/18 noted with enterococcus VRE.   - Abdominal CT performed at that time with fluid  collection and drain placed on 11/22 for drainage.   - Intra-abdominal abscess culture also with enterococcus VRE.   - ID consulted and pt placed on antibxs for treatment. Pt was on Cefepime, Daptomycin, and Fluconazole for treatment.    - Pt remains with RLQ drains (one JOSE A and one Percutaneous).  One JOSE A removed 11/28.  Perc drain in placed draining tan, clear drainage.  WBC slowly improving.   - Liver US on 11/26 reviewed.   - Abdominal CT performed 11/27 and reviewed. Fluid collection noted with improvement on CT.  ID following and reassured by findings.    - Dapto, Cefepime and Flagyl changed 11/30/18 to Linezolid 600 mg PO q 12 hr x 10 days per ID.     - added back cefepime after thora.  ID re consulted.  -Completed Zyvox x 10 days per ID thru 12/9/18. Monitor.      Weakness    - Pt remains significantly debilitated.   - PT/OT for strengthening.   - Currently will need SNF for placement and further rehabilitation.  Insurance does not cover Rehab.  - Pt remains severely debilitated and not strong enough for SNF at this time.   - Cont with strengthening and plan for SNF consult again next week.        Acute renal failure with acute tubular necrosis superimposed on stage 3 chronic kidney disease    - Renal function slow to recover post-op.   - Nephrology consulted for management.   - Cont with HD as per nephrology recs.  Apprec recs.    - HD on 11/27 w/ 2L off. Pt tolerated well.    - Lasix 160 mg challenge 11/28 w/ minimal output.    - HD 12/10- 2L removed- tolerated well.   - Strict I&Os.      Anemia of chronic disease    - H&H stable. Monitor with daily labs.   - Chest/Abd/pelvis CT 12/4- no fluid to be drainged per transplant surgeon.  No source of bleeding.     - last liver US 11/27. Evolving peritransplant hematomas with anechoic fluid collection adjacent to the right hepatic lobe.  Small volume perihepatic free fluid.     At risk for opportunistic infections    - Cont with bactrim and valcyte for protection  against opportunistic infections.   - cont Isavuconazonium.     Long-term use of immunosuppressant medication    - Cont with prograf. Draw prograf level daily and adjust dose as needed to maintain a therapeutic level.        Prophylactic immunotherapy    - See long term immunosuppression.        Type 2 diabetes mellitus without complication    - Endocrine consulted for management. Appreciate recs.   - hypoglycemia 12/10 requiring D50.    - repeat cx 12/11.       Nausea    - Intermittent, worse over past 10 days,  Changed Pepcid to Protonix 12/9/18.  Appetite seems to be slowly improving.    - Encourage multiple, small meals and cont PRN anti-emetics.  If no improvement, may need GI consult.    - GI symptoms now slowly improving.        Anxiety    - Restart Lexapro as per psych recs, 12/13.        Acute delirium    - Pt with intermittent confusion since transplant was improving slowly.   - Pt with some lethargy and confusion on 11/21. Head CT negative.   - AAOx3. More alert and awake on 11/27.   - AAOx4 on 11/29. Seroquel d/c'd 11/30/18.  Trazodone for insomnia ordered w PRN dose.    - Pt then with delirium again. re consulted psych.   - delirium could be d/t rising prograf.  AMS improved since holding Prograf.    - CT head 12/4 with no acute findings.   - Restarted Prograf 12/6- will need to monitor mental status closely.  - 12/10 delirium resolved.     Moderate malnutrition    - muscle wasting noted.  Appetite on and off.  Cont supplements.  Dietician following.  Apprec recs.    - encourage multiple, small meals.     Adrenal cortical steroids causing adverse effect in therapeutic use           Pleural effusion associated with hepatic disorder    - c/o increased pain w deep breath today to right side.  CXR showed increased opacity in the inferior hemothorax on the right side since 11/26/2018.  Pulse ox 97-99% on RA.  Cont to monitor    - continues to have fluid to RLL.  WBC remains elevated.    - thoracentesis  performed on 11/30. Fluid noted with 4k WBC, 93 SEGS. cx no growth to date.  Cefepime added for treatment.  - Chest CT showing right pleural effusion improved, left w increased pleural effusion.   - Per ID, completed treatment of empyema w Cefepime thru 12/8.  - sats remain 97-99% on RA.     DEL (acute kidney injury)    - DEL for over 2 weeks prior to transplant. Nephrology was consulted.     - Post transplant, Nephrology cont to follow.  He received HD last on 11/30 for metabolic clearance and fluid management.     - Attempted Lasix challenge (160 mg) x1 on 11/28- pt diuresed 105 cc.    - HD 12/3- removed 1.5 liter.  Cont strict I/O's.  Monitor closely.  - Lasix challenge 12/4 with minimal UOP  - Last HD 12/5- 2.3L removed.    - Crackles to mell bases and dependent edema.  Lasix 100mg and albumin x3 12/6/18.  - HD 12/10- 2L removed- tolerated well.     Acute renal failure    - DEL since transplant.  Pt on Midodrine.  - Nephrology following for HD-  Pt cont to have hypotenstion worse w standing.  Midodrine 10 mg PRN for hypotension during HD ordered.   - BP since improved.  Midodrine discontinued.           VTE Risk Mitigation (From admission, onward)        Ordered     heparin (porcine) injection 1,000 Units  As needed (PRN)      12/12/18 1731     heparin (porcine) injection 5,000 Units  Every 8 hours      11/30/18 1149     IP VTE HIGH RISK PATIENT  Once      11/11/18 1208          The patients clinical status was discussed at multidisplinary rounds, involving transplant surgery, transplant medicine, pharmacy, nursing, nutrition, and social work    Discharge Planning:  Not a candidate for d/c at this time.     Kevin Miranda, NP  Liver Transplant  [facility]

## 2018-12-13 NOTE — SUBJECTIVE & OBJECTIVE
"Interval History:   Awoke several times throughout the night, pt is uncertain why, but notes that has back continues to cause him significant distress.  Discussed proper sleep hygiene.      Pt reports brief episodes of what he describes as panic attacks, during which he feels fear and shortness of breath at the thought of drowning.  He relates this to his difficulties eating.  States that this has been ongoing since transplant.    Received trazodone 50 QHS x1.  No ramelteon given.      Family History     None        Tobacco Use    Smoking status: Never Smoker   Substance and Sexual Activity    Alcohol use: Not on file    Drug use: Not on file    Sexual activity: Not on file     Psychotherapeutics (From admission, onward)    Start     Stop Route Frequency Ordered    12/10/18 2100  traZODone tablet 50 mg      -- Oral Nightly 12/10/18 1613    12/10/18 1713  ramelteon tablet 8 mg      -- Oral Nightly PRN 12/10/18 1614    11/30/18 1233  trazodone split tablet 25 mg      -- Oral Nightly PRN 11/30/18 1150    11/14/18 1118  haloperidol lactate (HALDOL) 5 mg/mL injection     Comments:  Created by cabinet override    11/14 4692   11/14/18 1118           Review of Systems   Constitutional: Negative for fever.   Gastrointestinal: Positive for nausea.   Neurological: Negative for weakness.   Psychiatric/Behavioral: Positive for sleep disturbance. Negative for confusion, decreased concentration and hallucinations. The patient is nervous/anxious.      Objective:     Vital Signs (Most Recent):  Temp: 98.1 °F (36.7 °C) (12/13/18 0715)  Pulse: 89 (12/13/18 0821)  Resp: (!) 27 (12/13/18 0821)  BP: 134/87 (12/13/18 0715)  SpO2: 98 % (12/13/18 0821) Vital Signs (24h Range):  Temp:  [97.8 °F (36.6 °C)-98.5 °F (36.9 °C)] 98.1 °F (36.7 °C)  Pulse:  [] 89  Resp:  [16-30] 27  SpO2:  [96 %-100 %] 98 %  BP: (111-156)/(59-98) 134/87     Height: 5' 10" (177.8 cm)  Weight: 70 kg (154 lb 5.2 oz)  Body mass index is 22.14 " "kg/m².      Intake/Output Summary (Last 24 hours) at 12/13/2018 0939  Last data filed at 12/13/2018 0500  Gross per 24 hour   Intake 1080 ml   Output 2625 ml   Net -1545 ml       Physical Exam   Constitutional: He appears well-developed. No distress.   HENT:   Head: Normocephalic.   Cardiovascular: Normal rate and regular rhythm.   Pulmonary/Chest: Effort normal.   Abdominal: Soft.   Musculoskeletal: He exhibits no edema.           Mental Status Exam:  Appearance: age appropriate, lying in bed  Grooming: appropriate to situation  Arousal: awake.  Behavior/Cooperation: cooperative  Speech: Appropriate  Language: appropriate english vocabulary  Affect: normal  Thought Process: normal  Thought Content: appropriate  Associations: no loose associations noted  Orientation: month/year/day  Memory: 3/3 registration.  2/3 recall at 5 minutes.  Mood: "Grumpy"  Attention: 5/5 successful subtractions on serial 7s.  Able to spell "house" backwards.  Fund of Knowledge: Decreased  Insight: fair  Judgment: fair     Significant Labs: All pertinent labs within the past 24 hours have been reviewed.      Microbiology Results (last 7 days)     Procedure Component Value Units Date/Time    Urine culture [[id number]] Collected:  12/11/18 2122    Order Status:  Completed Specimen:  Urine, Clean Catch Updated:  12/13/18 0101     Urine Culture, Routine No growth    Blood culture [[id number]] Collected:  12/11/18 1211    Order Status:  Completed Specimen:  Blood Updated:  12/12/18 1412     Blood Culture, Routine No Growth to date     Blood Culture, Routine No Growth to date    Narrative:       Draw 15 min apart    Blood culture [[id number]] Collected:  12/11/18 1211    Order Status:  Completed Specimen:  Blood Updated:  12/12/18 1412     Blood Culture, Routine No Growth to date     Blood Culture, Routine No Growth to date    Fungus culture [[id number]] Collected:  11/11/18 9486    Order Status:  Completed Specimen:  Pleural Fluid from Ascites " Updated:  12/11/18 1448     Fungus (Mycology) Culture No fungus isolated after 4 weeks    Culture, Anaerobic [[id number]] Collected:  11/30/18 1617    Order Status:  Completed Specimen:  Body Fluid from Pleural Fluid Updated:  12/10/18 0711     Anaerobic Culture No anaerobes isolated    Blood culture [[id number]] Collected:  12/04/18 1232    Order Status:  Completed Specimen:  Blood Updated:  12/09/18 1412     Blood Culture, Routine No growth after 5 days.    Narrative:       Draw 15 min apart    Blood culture [[id number]] Collected:  12/04/18 1245    Order Status:  Completed Specimen:  Blood Updated:  12/09/18 1412     Blood Culture, Routine No growth after 5 days.    Narrative:       Draw 15 min apart           Significant Imaging: I have reviewed all pertinent imaging results/findings within the past 24 hours.

## 2018-12-13 NOTE — PLAN OF CARE
Problem: Patient Care Overview  Goal: Plan of Care Review  Outcome: Ongoing (interventions implemented as appropriate)  POD#32 liver transplant.  Enzymes trending down.  Appetite poor, supplements ordered.  Patient nauseated today, zofran administered.  2 person assist with ambulation.  Vitals stable.  UO scant, dialysis yesterday.  Elevated prograf resolved today. No complaints of pain today.  BG controlled.  Patients wife 100% on self med process.  Patients mother and wife attentive to patient involved in patients care.
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
## 2018-12-13 NOTE — ASSESSMENT & PLAN NOTE
- H&H stable. Monitor with daily labs.   - Chest/Abd/pelvis CT 12/4- no fluid to be drainged per transplant surgeon.  No source of bleeding.     - last liver US 11/27. Evolving peritransplant hematomas with anechoic fluid collection adjacent to the right hepatic lobe.  Small volume perihepatic free fluid.

## 2018-12-13 NOTE — PLAN OF CARE
Problem: Occupational Therapy Goal  Goal: Occupational Therapy Goal  Goals to be met by: 12/18/18     Patient will increase functional independence with ADLs by performing:    UE Dressing with Supervision.  LE Dressing with Minimal Assistance.  Grooming while standing at sink with Stand-by Assistance.  Toileting from toilet with Minimal Assistance for hygiene and clothing management.   Toilet transfer to toilet with Stand-by Assistance.  Upper extremity  theraband exercise program x  15 reps  with independence. Met 12/9          Outcome: Ongoing (interventions implemented as appropriate)  Pt progressing well with goals. Continue with POC.   Julia ladd OT  12/13/2018

## 2018-12-13 NOTE — PT/OT/SLP PROGRESS
Occupational Therapy   Treatment    Name: Femi Enciso  MRN: [id number]  Admitting Diagnosis:  S/P liver transplant  25 Days Post-Op    Recommendations:     Discharge Recommendations: nursing facility, skilled  Discharge Equipment Recommendations:  (TBD)  Barriers to discharge:  None    Subjective     Pain/Comfort:  · Pain Rating 1: 0/10  · Pain Rating Post-Intervention 1: 0/10    Objective:     Communicated with: RN prior to session.  Patient found with all lines intact, call button in reach and RN notified and telemetry, pulse ox (continuous) upon OT entry to room.    General Precautions: Standard, fall   Orthopedic Precautions:N/A   Braces: N/A     Occupational Performance:    Bed Mobility:    · Patient completed Rolling/Turning to Right with stand by assistance  · Patient completed Supine to Sit with stand by assistance     Functional Mobility/Transfers:  · Patient completed Sit <> Stand Transfer with moderate assistance and of 2 persons  with  no assistive device  ( x3 trials from EOB, requiring tactile cues for hip and trunk extension and provided blocking of bilateral knees)     · Pt completed stand pivot transfer from EOB < bedside chair with moderate assistance using RW for support ( pt able to take 3 steps towards chair requiring assistance with RW management/hand placement for improved safety)    · Functional Mobility: Pt completed 4 lateral steps to left and right at side of bed with minimal assistance using RW for support.      Activities of Daily Living:  · Upper Body Dressing: minimum assistance to milady gown like jacket while seated EOB     [facility] 6 Click ADL: 16    Treatment & Education:  -Pt edu on OT role/POC  -Importance of OOB activity with staff assistance  -Safety during functional t/f and mobility  - White board updated  - Multiple self care tasks completed-- assistance level noted above  - All questions/concerns answered within OT scope of practice  - Pt completed BUE exercises while seated [facility]  including: 1x15 reps in shoulder flexion, elbow flexion/extension, chest press, scapula squeezes, and alternating punches. Pt tolerated well with min rest breaks between each exercise; pt educated to complete BUE exercises 2-3x per day- pt verbalized understanding    Patient left up in chair with all lines intact, call button in reach and Rn notified  Education:    Assessment:     Femi Enciso is a 53 y.o. male with a medical diagnosis of S/P liver transplant. Pt alert and willing to participate in therapy session this date. He demos' good progress towards overall activity tolerance and assistance level with functional transfers/mobility.  He presents with the following performance deficits affecting function are weakness, impaired endurance, impaired balance, gait instability, decreased lower extremity function, impaired self care skills, impaired functional mobilty, pain, impaired cardiopulmonary response to activity. He would benefit from SNF following d/c to continue to progress towards goals and improve quality of life.     Rehab Prognosis:  Good; patient would benefit from acute skilled OT services to address these deficits and reach maximum level of function.       Plan:     Patient to be seen 4 x/week to address the above listed problems via self-care/home management, therapeutic activities, therapeutic exercises, neuromuscular re-education  · Plan of Care Expires: 12/21/18  · Plan of Care Reviewed with: patient    This Plan of care has been discussed with the patient who was involved in its development and understands and is in agreement with the identified goals and treatment plan    GOALS:   Multidisciplinary Problems     Occupational Therapy Goals        Problem: Occupational Therapy Goal    Goal Priority Disciplines Outcome Interventions   Occupational Therapy Goal     OT, PT/OT Ongoing (interventions implemented as appropriate)    Description:  Goals to be met by: 12/18/18     Patient will increase  functional independence with ADLs by performing:    UE Dressing with Supervision.  LE Dressing with Minimal Assistance.  Grooming while standing at sink with Stand-by Assistance.  Toileting from toilet with Minimal Assistance for hygiene and clothing management.   Toilet transfer to toilet with Stand-by Assistance.  Upper extremity  theraband exercise program x  15 reps  with independence. Met 12/9                     Multidisciplinary Problems (Resolved)        Problem: Occupational Therapy Goal    Goal Priority Disciplines Outcome Interventions   Occupational Therapy Goal   (Resolved)     OT, PT/OT Resolved for Upgrade                    Time Tracking:     OT Date of Treatment: 12/13/18  OT Start Time: 0915  OT Stop Time: 0945  OT Total Time (min): 30 min    Billable Minutes:Therapeutic Activity 10  Therapeutic Exercise 20    Julia ladd OT  12/13/2018
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
## 2018-12-13 NOTE — PT/OT/SLP PROGRESS
Physical Therapy Treatment    Patient Name:  Femi Enciso   MRN:  [id number]    Recommendations:     Discharge Recommendations:  nursing facility, skilled   Discharge Equipment Recommendations: (TBD)   Barriers to discharge: increased assistance required    Assessment:     Femi Enciso is a 53 y.o. male admitted with a medical diagnosis of S/P liver transplant.  He presents with the following impairments/functional limitations:  weakness, impaired endurance, impaired self care skills, impaired functional mobilty, gait instability, impaired balance, decreased lower extremity function, impaired cardiopulmonary response to activity. Pt mainly limited by functional weakness of BLE. Pt able to transfer bed>chair with max Ax2 via stand pivot. Pt became anxious during standing and felt like he was falling despite being supported by 2 PTs. Pt also complained of dizziness after initial sit<>stand but resolved with seated rest break. Pt will benefit from skilled physical therapy services to receive education as needed, improve the above deficits, and return to prior level of function.    Rehab Prognosis:  good; patient would benefit from acute skilled PT services to address these deficits and reach maximum level of function.      Recent Surgery: Procedure(s) (LRB):  LAPAROTOMY, EXPLORATORY, AFTER LIVER TRANSPLANT, LIKELY  ABDOMINAL CLOSURE (N/A) 25 Days Post-Op    Plan:     During this hospitalization, patient to be seen 4 x/week to address the above listed problems via gait training, therapeutic activities, therapeutic exercises, neuromuscular re-education  · Plan of Care Expires:  12/19/18   Plan of Care Reviewed with: patient    Subjective     Communicated with RN prior to session.  Patient found laying supine upon PT entry to room, agreeable to treatment.      Chief Complaint: dizziness, fear of falling  Patient comments/goals: Pt states he feel likes he is falling backwards and forwards while standing   Pain/Comfort:  · Pain  Rating 1: 0/10  · Pain Rating Post-Intervention 1: 0/10    Patients cultural, spiritual, [employer]ism conflicts given the current situation: none noted     Objective:     Patient found with: telemetry, pulse ox (continuous)     General Precautions: Standard, fall   Orthopedic Precautions:N/A   Braces: N/A     Functional Mobility:  Bed mobility:  Supine>sit: x1 trial(s); SBA  Rolling R: x1 trial(s); SBA    Transfers:  Sit>stand: x3 trial(s) from bed; mod Ax2 without AD (HHAx2)  Stand>sit: x3 trial(s) to bed/bedside chair; mod Ax2 without AD (HHAx2)  Bed>chair: x1 trial; max Ax2 via stand pivot (pt took 2 small steps to the left during stand pivot)   **Facilitation provided at hips to promote extension and knees to prevent buckling. Verbal/tactile cues given to achieve and maintain upright posture. Verbal cues provided for hand placement during sit<>stands.    Gait:  · Pt unsafe to ambulate at this 2/2 functional weakness and impaired standing balance.    Balance:  · Static sitting: supervision  · Dyamic sitting: supervision  · Static standing: mod Ax2   · See above for facilitation provided at hips and knees.   · Dynamic standing: max Ax2  · See above for facilitation provided at hips and knees.     AM-PAC 6 CLICK MOBILITY  Turning over in bed (including adjusting bedclothes, sheets and blankets)?: 4  Sitting down on and standing up from a chair with arms (e.g., wheelchair, bedside commode, etc.): 2  Moving from lying on back to sitting on the side of the bed?: 4  Moving to and from a bed to a chair (including a wheelchair)?: 2  Need to walk in hospital room?: 1  Climbing 3-5 steps with a railing?: 1  Basic Mobility Total Score: 14       Therapeutic Activities and Exercises:   Pt educated on the following:  · Importance of participating in therapy to return to PLOF  · Importance of sitting up in chair throughout the day to improve tolerance to upright activity  · PT POC  · Not getting up without assistance from RN to  prevent falls  · Performing BLE exercises (marches, LAQs, ankle pumps) throughout the day to improve strength/endurance   Pt verbalized understanding and was agreeable with all education. All needs and questions addressed.    Patient left up in chair with all lines intact, call button in reach and RN notified..    GOALS:   Multidisciplinary Problems     Physical Therapy Goals        Problem: Physical Therapy Goal    Goal Priority Disciplines Outcome Goal Variances Interventions   Physical Therapy Goal     PT, PT/OT Ongoing (interventions implemented as appropriate)     Description:  Goals to be met by: 2018     Patient will increase functional independence with mobility by performin. Supine to sit with Modified [place] -not met  2. Sit to stand transfer with [place] -not met  3. Bed to chair transfer with independence using no AD -not met  4. Gait  x150 feet with Supervision using LRAD. -not met  5. Lower extremity exercise program x20 reps per handout, with independence -not met                    Multidisciplinary Problems (Resolved)        Problem: Physical Therapy Goal    Goal Priority Disciplines Outcome Goal Variances Interventions   Physical Therapy Goal   (Resolved)     PT, PT/OT Resolved for Upgrade                     Time Tracking:     PT Received On: 18  PT Start Time: 1523     PT Stop Time: 1538  PT Total Time (min): 15 min     Billable Minutes: Therapeutic Activity 15    Treatment Type: Treatment  PT/PTA: PT     PTA Visit Number: 0     PATTI Hayes  2018

## 2018-12-13 NOTE — SUBJECTIVE & OBJECTIVE
Scheduled Meds:   albuterol-ipratropium  3 mL Nebulization Q4H WAKE    aspirin  81 mg Oral Daily    bacitracin   Topical (Top) TID    ergocalciferol  50,000 Units Oral Q7 Days    escitalopram oxalate  10 mg Oral Nightly    heparin (porcine)  5,000 Units Subcutaneous Q8H    isavuconazonium sulfate  372 mg Oral Daily    lidocaine  1 patch Transdermal Q24H    pantoprazole  40 mg Oral Daily    predniSONE  5 mg Oral Daily    Followed by    [START ON 12/17/2018] predniSONE  2.5 mg Oral Daily    sulfamethoxazole-trimethoprim 400-80mg  1 tablet Oral Every Mon, Wed, Fri    tacrolimus  2 mg Oral BID    traZODone  50 mg Oral QHS    ursodiol  300 mg Oral BID    valganciclovir 50 mg/ml  100 mg Oral Once per day on Mon Wed Fri     Continuous Infusions:  PRN Meds:acetaminophen, albuterol-ipratropium, artificial tears, bisacodyl, dextrose 50%, dextrose 50%, glucagon (human recombinant), glucose, glucose, heparin (porcine), insulin aspart U-100, midodrine, ondansetron, ramelteon, tiZANidine, traZODone    Review of Systems   Constitutional: Positive for activity change, appetite change (poor) and fatigue. Negative for fever.   HENT: Negative for facial swelling and voice change.    Eyes: Negative.    Respiratory: Positive for shortness of breath (DIXON). Negative for apnea, cough, choking, chest tightness and wheezing.    Cardiovascular: Negative.  Negative for chest pain, palpitations and leg swelling.   Gastrointestinal: Positive for abdominal distention. Negative for abdominal pain, constipation, diarrhea, nausea and vomiting.   Genitourinary: Positive for decreased urine volume. Negative for difficulty urinating, dysuria, hematuria and urgency.   Musculoskeletal: Negative.  Negative for back pain, gait problem, neck pain and neck stiffness.   Skin: Positive for wound. Negative for color change and pallor.   Allergic/Immunologic: Positive for immunocompromised state.   Neurological: Positive for weakness. Negative  for dizziness, seizures, light-headedness and headaches.   Psychiatric/Behavioral: Negative for behavioral problems, confusion, decreased concentration, dysphoric mood, hallucinations, sleep disturbance and suicidal ideas. The patient is not nervous/anxious.      Objective:     Vital Signs (Most Recent):  Temp: 98.5 °F (36.9 °C) (12/13/18 1152)  Pulse: 98 (12/13/18 1202)  Resp: (!) 26 (12/13/18 1202)  BP: (!) 142/89 (12/13/18 1140)  SpO2: 97 % (12/13/18 1202) Vital Signs (24h Range):  Temp:  [97.8 °F (36.6 °C)-98.5 °F (36.9 °C)] 98.5 °F (36.9 °C)  Pulse:  [] 98  Resp:  [16-29] 26  SpO2:  [96 %-100 %] 97 %  BP: (111-156)/(59-98) 142/89     Weight: 70 kg (154 lb 5.2 oz)  Body mass index is 22.14 kg/m².    Intake/Output - Last 3 Shifts       12/11 0700 - 12/12 0659 12/12 0700 - 12/13 0659 12/13 0700 - 12/14 0659    P.O. 900 480 120    Other  600     Total Intake(mL/kg) 900 (11.9) 1080 (15.4) 120 (1.7)    Urine (mL/kg/hr) 30 (0) 25 (0) 20 (0)    Emesis/NG output 0 0 0    Other 0 2600 0    Stool 0 0 1    Blood 0 0 0    Total Output 30 2625 21    Net +870 -1545 +99           Urine Occurrence 0 x 0 x     Stool Occurrence 0 x 0 x     Emesis Occurrence 0 x            Physical Exam   Constitutional: He is oriented to person, place, and time. He appears well-developed. No distress.   HENT:   Head: Normocephalic and atraumatic.   White drainage noted to back of throat and roof on mouth, voice hoarse   Eyes: Pupils are equal, round, and reactive to light. Scleral icterus is present.   Neck: Normal range of motion. Neck supple. No JVD present.   Cardiovascular: Normal rate, regular rhythm and normal heart sounds.   No murmur heard.  Pulmonary/Chest: Effort normal. No respiratory distress. He has decreased breath sounds in the right lower field and the left lower field. He has no wheezes. He exhibits no tenderness.   Taking short breaths, IS up to 500   Abdominal: Soft. Bowel sounds are normal. He exhibits no distension.  There is tenderness.   Chevron inc ECTOR w/ staples  RLQ JOSE A drain and percutaneous drain site w suture CDI.    Musculoskeletal: Normal range of motion. He exhibits no tenderness. Edema: trace ankle edema.   Neurological: He is alert and oriented to person, place, and time. He has normal reflexes.   Skin: Skin is warm and dry. Capillary refill takes 2 to 3 seconds. He is not diaphoretic. No pallor.   Psychiatric: He has a normal mood and affect. His behavior is normal. Judgment and thought content normal. His affect is not labile. He is not slowed. Cognition and memory are not impaired.   Nursing note and vitals reviewed.      Laboratory:  Immunosuppressants         Stop Route Frequency     tacrolimus capsule 2 mg      -- Oral 2 times daily        CBC:   Recent Labs   Lab 12/13/18  0702   WBC 9.97   RBC 2.98*   HGB 8.9*   HCT 27.1*      MCV 91   MCH 29.9   MCHC 32.8     CMP:   Recent Labs   Lab 12/13/18  0702   *   CALCIUM 8.4*   ALBUMIN 2.2*   PROT 5.5*      K 3.7   CO2 24      BUN 14   CREATININE 2.1*   ALKPHOS 157*   ALT 15   AST 11   BILITOT 1.9*     Coagulation:   Recent Labs   Lab 12/13/18  0702   INR 1.2     Tacrolimus Lvl   Date Value Ref Range Status   12/13/2018 8.9 5.0 - 15.0 ng/mL Final     Comment:     Testing performed by Liquid Chromatography-Tandem  Mass Spectrometry (LC-MS/MS).  This test was developed and its performance characteristics  determined by [facility], Department of Pathology  and Laboratory Medicine in a manner consistent with CLIA  requirements. It has not been cleared or approved by the [place]  Food and Drug Administration.  This test is used for clinical   purposes.  It should not be regarded as investigational or for  research.     12/12/2018 21.3 (H) 5.0 - 15.0 ng/mL Final     Comment:     Testing performed by Liquid Chromatography-Tandem  Mass Spectrometry (LC-MS/MS).  This test was developed and its performance characteristics  determined by [facility], Department of Pathology  and Laboratory Medicine in a manner consistent with CLIA  requirements. It has not been cleared or approved by the [place]  Food and Drug Administration.  This test is used for clinical   purposes.  It should not be regarded as investigational or for  research.     12/11/2018 13.8 5.0 - 15.0 ng/mL Final     Comment:     Testing performed by Liquid Chromatography-Tandem  Mass Spectrometry (LC-MS/MS).  This test was developed and its performance characteristics  determined by [facility], Department of Pathology  and Laboratory Medicine in a manner consistent with CLIA  requirements. It has not been cleared or approved by the [place]  Food and Drug Administration.  This test is used for clinical   purposes.  It should not be regarded as investigational or for  research.     12/10/2018 7.2 5.0 - 15.0 ng/mL Final     Comment:     Testing performed by Liquid Chromatography-Tandem  Mass Spectrometry (LC-MS/MS).  This test was developed and its performance characteristics  determined by [facility], Department of Pathology  and Laboratory Medicine in a manner consistent with CLIA  requirements. It has not been cleared or approved by the [place]  Food and Drug Administration.  This test is used for clinical   purposes.  It should not be regarded as investigational or for  research.           Labs within the past 24 hours have been reviewed.    Diagnostic Results:  None

## 2018-12-13 NOTE — PROGRESS NOTES
VITALIY received phone call from [facility] coordinator Maggy confirming dialysis referral has been received and is being processed.  Maggy reported only needing Hep B antibody lab result to be sent to complete referral.  [facility] rounding VITALIY Alvarez notified pt's NP Kevin of same.  SW to fax lab result when available and will f/u with Maggy regarding pt's dialysis placement.    Addendum 4:35 PM  VITALIY contacted by pt's spouse Suzy notifying that pt's back-up caregiver daughter Ann will no longer be available to assist pt when Suzy returns back to work in [place] on 1/14/18.  Suzy expressed feeling overwhelmed and unsure of how to proceed.  VITALIY provided reflective listening and discussed need for brainstorming and talking through with pt, Ann, and pt's mother.  VITALIY made plans to meet with Suzy tomorrow morning to discuss further.  VITALIY remains available and will f/u as planned.
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
## 2018-12-13 NOTE — PROGRESS NOTES
Hemodialysis tx complete, 2L removed in 3 hour tx, tolerated well. Blood returned via right chest perm cath, both lumens hep locked, capped and taped

## 2018-12-13 NOTE — PLAN OF CARE
Problem: Physical Therapy Goal  Goal: Physical Therapy Goal  Goals to be met by: 2018     Patient will increase functional independence with mobility by performin. Supine to sit with Modified [place] -not met  2. Sit to stand transfer with [place] -not met  3. Bed to chair transfer with independence using no AD -not met  4. Gait  x150 feet with Supervision using LRAD. -not met  5. Lower extremity exercise program x20 reps per handout, with independence -not met         Outcome: Ongoing (interventions implemented as appropriate)  Goals remain appropriate. Continue with POC.    Wil Dowell, SPT  2018

## 2018-12-14 PROBLEM — D72.829 LEUCOCYTOSIS: Status: RESOLVED | Noted: 2018-11-17 | Resolved: 2018-12-14

## 2018-12-14 PROBLEM — R41.0 ACUTE DELIRIUM: Status: RESOLVED | Noted: 2018-12-07 | Resolved: 2018-12-14

## 2018-12-14 PROBLEM — E43 SEVERE MALNUTRITION: Status: ACTIVE | Noted: 2018-12-14

## 2018-12-14 LAB
ALBUMIN SERPL BCP-MCNC: 2.2 G/DL
ALP SERPL-CCNC: 148 U/L
ALT SERPL W/O P-5'-P-CCNC: 11 U/L
ANION GAP SERPL CALC-SCNC: 8 MMOL/L
AST SERPL-CCNC: 9 U/L
BASOPHILS # BLD AUTO: 0.12 K/UL
BASOPHILS NFR BLD: 1.5 %
BILIRUB SERPL-MCNC: 1.8 MG/DL
BLD PROD TYP BPU: NORMAL
BLOOD UNIT EXPIRATION DATE: NORMAL
BLOOD UNIT TYPE CODE: 5100
BLOOD UNIT TYPE: NORMAL
BUN SERPL-MCNC: 18 MG/DL
CALCIUM SERPL-MCNC: 8.3 MG/DL
CHLORIDE SERPL-SCNC: 105 MMOL/L
CO2 SERPL-SCNC: 25 MMOL/L
CODING SYSTEM: NORMAL
CREAT SERPL-MCNC: 2.6 MG/DL
DIFFERENTIAL METHOD: ABNORMAL
DISPENSE STATUS: NORMAL
EOSINOPHIL # BLD AUTO: 0.2 K/UL
EOSINOPHIL NFR BLD: 2.6 %
ERYTHROCYTE [DISTWIDTH] IN BLOOD BY AUTOMATED COUNT: 15.8 %
EST. GFR  (AFRICAN AMERICAN): 31.2 ML/MIN/1.73 M^2
EST. GFR  (NON AFRICAN AMERICAN): 26.9 ML/MIN/1.73 M^2
GLUCOSE SERPL-MCNC: 82 MG/DL
HAV IGM SERPL QL IA: NEGATIVE
HBV CORE AB SERPL QL IA: NEGATIVE
HBV SURFACE AB SER-ACNC: NEGATIVE M[IU]/ML
HBV SURFACE AG SERPL QL IA: NEGATIVE
HCT VFR BLD AUTO: 26.3 %
HGB BLD-MCNC: 8.3 G/DL
IMM GRANULOCYTES # BLD AUTO: 0.05 K/UL
IMM GRANULOCYTES NFR BLD AUTO: 0.6 %
INR PPP: 1.2
LYMPHOCYTES # BLD AUTO: 0.5 K/UL
LYMPHOCYTES NFR BLD: 6.1 %
MAGNESIUM SERPL-MCNC: 1.4 MG/DL
MCH RBC QN AUTO: 29.2 PG
MCHC RBC AUTO-ENTMCNC: 31.6 G/DL
MCV RBC AUTO: 93 FL
MONOCYTES # BLD AUTO: 0.9 K/UL
MONOCYTES NFR BLD: 11 %
NEUTROPHILS # BLD AUTO: 6.4 K/UL
NEUTROPHILS NFR BLD: 78.2 %
NRBC BLD-RTO: 0 /100 WBC
PLATELET # BLD AUTO: 337 K/UL
PMV BLD AUTO: 11.1 FL
POCT GLUCOSE: 80 MG/DL (ref 70–110)
POCT GLUCOSE: 82 MG/DL (ref 70–110)
POCT GLUCOSE: 84 MG/DL (ref 70–110)
POTASSIUM SERPL-SCNC: 3.1 MMOL/L
PROT SERPL-MCNC: 5.4 G/DL
PROTHROMBIN TIME: 12.1 SEC
RBC # BLD AUTO: 2.84 M/UL
SODIUM SERPL-SCNC: 138 MMOL/L
TACROLIMUS BLD-MCNC: 7.5 NG/ML
TRANS ERYTHROCYTES VOL PATIENT: NORMAL ML
WBC # BLD AUTO: 8.2 K/UL

## 2018-12-14 PROCEDURE — 25000003 PHARM REV CODE 250: Performed by: NURSE PRACTITIONER

## 2018-12-14 PROCEDURE — 25000003 PHARM REV CODE 250: Performed by: STUDENT IN AN ORGANIZED HEALTH CARE EDUCATION/TRAINING PROGRAM

## 2018-12-14 PROCEDURE — 86709 HEPATITIS A IGM ANTIBODY: CPT

## 2018-12-14 PROCEDURE — 25000003 PHARM REV CODE 250: Performed by: PHYSICIAN ASSISTANT

## 2018-12-14 PROCEDURE — 83735 ASSAY OF MAGNESIUM: CPT

## 2018-12-14 PROCEDURE — 27000646 HC AEROBIKA DEVICE

## 2018-12-14 PROCEDURE — 99233 PR SUBSEQUENT HOSPITAL CARE,LEVL III: ICD-10-PCS | Mod: 24,,, | Performed by: PHYSICIAN ASSISTANT

## 2018-12-14 PROCEDURE — 63600175 PHARM REV CODE 636 W HCPCS: Performed by: NURSE PRACTITIONER

## 2018-12-14 PROCEDURE — 99233 SBSQ HOSP IP/OBS HIGH 50: CPT | Mod: 24,,, | Performed by: PHYSICIAN ASSISTANT

## 2018-12-14 PROCEDURE — 86704 HEP B CORE ANTIBODY TOTAL: CPT

## 2018-12-14 PROCEDURE — 80053 COMPREHEN METABOLIC PANEL: CPT

## 2018-12-14 PROCEDURE — 80197 ASSAY OF TACROLIMUS: CPT

## 2018-12-14 PROCEDURE — 87340 HEPATITIS B SURFACE AG IA: CPT

## 2018-12-14 PROCEDURE — 94761 N-INVAS EAR/PLS OXIMETRY MLT: CPT

## 2018-12-14 PROCEDURE — 97535 SELF CARE MNGMENT TRAINING: CPT

## 2018-12-14 PROCEDURE — 90935 PR HEMODIALYSIS, ONE EVALUATION: ICD-10-PCS | Mod: ,,, | Performed by: INTERNAL MEDICINE

## 2018-12-14 PROCEDURE — 63600175 PHARM REV CODE 636 W HCPCS: Mod: JG | Performed by: INTERNAL MEDICINE

## 2018-12-14 PROCEDURE — 63600175 PHARM REV CODE 636 W HCPCS: Performed by: STUDENT IN AN ORGANIZED HEALTH CARE EDUCATION/TRAINING PROGRAM

## 2018-12-14 PROCEDURE — 94640 AIRWAY INHALATION TREATMENT: CPT

## 2018-12-14 PROCEDURE — 99900035 HC TECH TIME PER 15 MIN (STAT)

## 2018-12-14 PROCEDURE — 86706 HEP B SURFACE ANTIBODY: CPT

## 2018-12-14 PROCEDURE — 97116 GAIT TRAINING THERAPY: CPT

## 2018-12-14 PROCEDURE — 90935 HEMODIALYSIS ONE EVALUATION: CPT

## 2018-12-14 PROCEDURE — 85610 PROTHROMBIN TIME: CPT

## 2018-12-14 PROCEDURE — 20600001 HC STEP DOWN PRIVATE ROOM

## 2018-12-14 PROCEDURE — 85025 COMPLETE CBC W/AUTO DIFF WBC: CPT

## 2018-12-14 PROCEDURE — 97530 THERAPEUTIC ACTIVITIES: CPT

## 2018-12-14 PROCEDURE — 94664 DEMO&/EVAL PT USE INHALER: CPT

## 2018-12-14 PROCEDURE — 90935 HEMODIALYSIS ONE EVALUATION: CPT | Mod: ,,, | Performed by: INTERNAL MEDICINE

## 2018-12-14 PROCEDURE — 25000242 PHARM REV CODE 250 ALT 637 W/ HCPCS: Performed by: NURSE PRACTITIONER

## 2018-12-14 RX ORDER — TRAMADOL HYDROCHLORIDE 50 MG/1
50 TABLET ORAL EVERY 6 HOURS PRN
Status: DISCONTINUED | OUTPATIENT
Start: 2018-12-14 | End: 2018-12-26

## 2018-12-14 RX ORDER — POTASSIUM CHLORIDE 750 MG/1
30 CAPSULE, EXTENDED RELEASE ORAL ONCE
Status: COMPLETED | OUTPATIENT
Start: 2018-12-14 | End: 2018-12-14

## 2018-12-14 RX ADMIN — URSODIOL 300 MG: 300 CAPSULE ORAL at 11:12

## 2018-12-14 RX ADMIN — HEPARIN SODIUM 5000 UNITS: 5000 INJECTION, SOLUTION INTRAVENOUS; SUBCUTANEOUS at 02:12

## 2018-12-14 RX ADMIN — TRAZODONE HYDROCHLORIDE 25 MG: 50 TABLET ORAL at 09:12

## 2018-12-14 RX ADMIN — TACROLIMUS 2 MG: 1 CAPSULE ORAL at 05:12

## 2018-12-14 RX ADMIN — ASPIRIN 81 MG CHEWABLE TABLET 81 MG: 81 TABLET CHEWABLE at 11:12

## 2018-12-14 RX ADMIN — TACROLIMUS 2 MG: 1 CAPSULE ORAL at 06:12

## 2018-12-14 RX ADMIN — LIDOCAINE 1 PATCH: 50 PATCH CUTANEOUS at 04:12

## 2018-12-14 RX ADMIN — MORPHINE 100 MG: 10 SOLUTION ORAL at 09:12

## 2018-12-14 RX ADMIN — IPRATROPIUM BROMIDE AND ALBUTEROL SULFATE 3 ML: .5; 3 SOLUTION RESPIRATORY (INHALATION) at 03:12

## 2018-12-14 RX ADMIN — ONDANSETRON 4 MG: 2 INJECTION INTRAMUSCULAR; INTRAVENOUS at 08:12

## 2018-12-14 RX ADMIN — ACETAMINOPHEN 650 MG: 325 TABLET, FILM COATED ORAL at 09:12

## 2018-12-14 RX ADMIN — RAMELTEON 8 MG: 8 TABLET, FILM COATED ORAL at 09:12

## 2018-12-14 RX ADMIN — TIZANIDINE 2 MG: 2 TABLET ORAL at 03:12

## 2018-12-14 RX ADMIN — BACITRACIN: 500 OINTMENT TOPICAL at 11:12

## 2018-12-14 RX ADMIN — ESCITALOPRAM OXALATE 10 MG: 10 TABLET ORAL at 09:12

## 2018-12-14 RX ADMIN — TRAMADOL HYDROCHLORIDE 50 MG: 50 TABLET, FILM COATED ORAL at 11:12

## 2018-12-14 RX ADMIN — ERYTHROPOIETIN 4200 UNITS: 10000 INJECTION, SOLUTION INTRAVENOUS; SUBCUTANEOUS at 11:12

## 2018-12-14 RX ADMIN — POTASSIUM CHLORIDE 30 MEQ: 750 CAPSULE, EXTENDED RELEASE ORAL at 11:12

## 2018-12-14 RX ADMIN — ONDANSETRON 4 MG: 2 INJECTION INTRAMUSCULAR; INTRAVENOUS at 09:12

## 2018-12-14 RX ADMIN — ISAVUCONAZONIUM SULFATE 372 MG: 186 CAPSULE ORAL at 11:12

## 2018-12-14 RX ADMIN — SODIUM CHLORIDE: 0.9 INJECTION, SOLUTION INTRAVENOUS at 08:12

## 2018-12-14 RX ADMIN — HEPARIN SODIUM 1000 UNITS: 1000 INJECTION, SOLUTION INTRAVENOUS; SUBCUTANEOUS at 11:12

## 2018-12-14 RX ADMIN — SULFAMETHOXAZOLE AND TRIMETHOPRIM 1 TABLET: 400; 80 TABLET ORAL at 09:12

## 2018-12-14 RX ADMIN — IPRATROPIUM BROMIDE AND ALBUTEROL SULFATE 3 ML: .5; 3 SOLUTION RESPIRATORY (INHALATION) at 12:12

## 2018-12-14 RX ADMIN — TIZANIDINE 2 MG: 2 TABLET ORAL at 09:12

## 2018-12-14 RX ADMIN — PREDNISONE 5 MG: 5 TABLET ORAL at 11:12

## 2018-12-14 RX ADMIN — TRAZODONE HYDROCHLORIDE 50 MG: 50 TABLET ORAL at 09:12

## 2018-12-14 RX ADMIN — URSODIOL 300 MG: 300 CAPSULE ORAL at 09:12

## 2018-12-14 RX ADMIN — HEPARIN SODIUM 5000 UNITS: 5000 INJECTION, SOLUTION INTRAVENOUS; SUBCUTANEOUS at 09:12

## 2018-12-14 RX ADMIN — BACITRACIN: 500 OINTMENT TOPICAL at 09:12

## 2018-12-14 RX ADMIN — PANTOPRAZOLE SODIUM 40 MG: 40 TABLET, DELAYED RELEASE ORAL at 11:12

## 2018-12-14 RX ADMIN — IPRATROPIUM BROMIDE AND ALBUTEROL SULFATE 3 ML: .5; 3 SOLUTION RESPIRATORY (INHALATION) at 07:12

## 2018-12-14 NOTE — PT/OT/SLP PROGRESS
"Physical Therapy Treatment    Patient Name:  Femi Enciso   MRN:  [id number]    Recommendations:     Discharge Recommendations:  nursing facility, skilled   Discharge Equipment Recommendations: (TBD)   Barriers to discharge: Decreased caregiver support at current functional level    Assessment:     Femi Enciso is a 53 y.o. male admitted with a medical diagnosis of S/P liver transplant.  He presents with the following impairments/functional limitations:  weakness, gait instability, impaired endurance, impaired balance, impaired self care skills, impaired functional mobilty, decreased safety awareness, impaired cardiopulmonary response to activity. Pt progressing functional mobility, as he is increasing independence with transfers and was able to take forward steps this date. Continues to demo functional weakness and impaired endurance, requiring assist of 2 to safely complete standing tasks. Pt would continue to benefit from skilled acute PT in order to address current deficits and progress functional mobility.     Rehab Prognosis:  good; patient would benefit from acute skilled PT services to address these deficits and reach maximum level of function.      Recent Surgery: Procedure(s) (LRB):  LAPAROTOMY, EXPLORATORY, AFTER LIVER TRANSPLANT, LIKELY  ABDOMINAL CLOSURE (N/A) 26 Days Post-Op    Plan:     During this hospitalization, patient to be seen 4 x/week to address the above listed problems via gait training, therapeutic activities, therapeutic exercises, neuromuscular re-education  · Plan of Care Expires:  12/19/18   Plan of Care Reviewed with: patient, spouse    Subjective     Communicated with RN prior to session.  Patient found supine upon PT entry to room, agreeable to treatment.      Chief Complaint: fatigue post-dialysis   Patient comments/goals: "I'm just waking up."   "I don't want to disappoint myself."   Pain/Comfort:   · Pain Rating 1: 0/10    Patients cultural, spiritual, Synagogue conflicts given the " current situation: none noted     Objective:     Patient found with: telemetry, pulse ox (continuous)     General Precautions: Standard, fall   Orthopedic Precautions:N/A   Braces: N/A     Functional Mobility:  · Bed Mobility:     · Supine to Sit: contact guard assistance with side rail and HOB elevated  · Transfers:     · Sit to Stand:  x1 rep with minAx2 from EOB; x3 reps with modAx2 from bedside chair  · Cues for transfer technique, proper hand and foot placement, and use of forward momentum to assist with ease of transfer  · Bed to Chair: moderate assistance and of 2 persons with  hand-held assist  using  Stand Pivot  · B knees blocked throughout to prevent buckling/maintain knee ext  · Cues throughout for sequencing of steps and forward progression   · Gait: ~5 steps + ~5 steps with HHAx2 and modAx2; chair follow throughout with seated rest between trials  · B knees blocked throughout stance phase to prevent buckling  · Cues for appropriate weight-shift, sequencing of steps, upright posture, and pursed lip breathing   · Attempt x3rd trial but knee not adequately blocked during stance, causing pt to buckle; unable to recover and required return to sit      AM-PAC 6 CLICK MOBILITY  Turning over in bed (including adjusting bedclothes, sheets and blankets)?: 3  Sitting down on and standing up from a chair with arms (e.g., wheelchair, bedside commode, etc.): 2  Moving from lying on back to sitting on the side of the bed?: 3  Moving to and from a bed to a chair (including a wheelchair)?: 2  Need to walk in hospital room?: 2  Climbing 3-5 steps with a railing?: 1  Basic Mobility Total Score: 13       Therapeutic Activities and Exercises:   Performed self-care tasks while seated EOB while PT provided postural cues and CGA-Julito for trunk control while reaching off RAINE.   Discussed d/c recs with pt and wife. Also discussed d/c with CM following session including recs and recent pt progress.   Pt and wife educated on PT  POC.   Pt instructed to continue performing seated therex (I) daily. Pt v/u.     Patient left up in chair with all lines intact, call button in reach, CM notified and pt's wife and mother present..    GOALS:   Multidisciplinary Problems     Physical Therapy Goals        Problem: Physical Therapy Goal    Goal Priority Disciplines Outcome Goal Variances Interventions   Physical Therapy Goal     PT, PT/OT Ongoing (interventions implemented as appropriate)     Description:  Goals to be met by: 2018     Patient will increase functional independence with mobility by performin. Supine to sit with Modified [place] -not met  2. Sit to stand transfer with [place] -not met  3. Bed to chair transfer with independence using no AD -not met  4. Gait  x150 feet with Supervision using LRAD. -not met  5. Lower extremity exercise program x20 reps per handout, with independence -not met                    Multidisciplinary Problems (Resolved)        Problem: Physical Therapy Goal    Goal Priority Disciplines Outcome Goal Variances Interventions   Physical Therapy Goal   (Resolved)     PT, PT/OT Resolved for Upgrade                     Time Tracking:     PT Received On: 18  PT Start Time: 1417     PT Stop Time: 1455  PT Total Time (min): 38 min     Billable Minutes: Gait Training 25 and Therapeutic Activity 13  (co-tx with OT)    Treatment Type: Treatment  PT/PTA: PT     PTA Visit Number: 0     Ramandeep Kumar, PT, DPT   2018  [phone]

## 2018-12-14 NOTE — SUBJECTIVE & OBJECTIVE
Scheduled Meds:   albuterol-ipratropium  3 mL Nebulization Q4H WAKE    aspirin  81 mg Oral Daily    bacitracin   Topical (Top) TID    epoetin sherlyn (PROCRIT) injection  50 Units/kg (Dosing Weight) Subcutaneous Every Mon, Wed, Fri    ergocalciferol  50,000 Units Oral Q7 Days    escitalopram oxalate  10 mg Oral Nightly    heparin (porcine)  5,000 Units Subcutaneous Q8H    isavuconazonium sulfate  372 mg Oral Daily    lidocaine  1 patch Transdermal Q24H    pantoprazole  40 mg Oral Daily    predniSONE  5 mg Oral Daily    Followed by    [START ON 12/17/2018] predniSONE  2.5 mg Oral Daily    sulfamethoxazole-trimethoprim 400-80mg  1 tablet Oral Every Mon, Wed, Fri    tacrolimus  2 mg Oral BID    traZODone  50 mg Oral QHS    ursodiol  300 mg Oral BID    valganciclovir 50 mg/ml  100 mg Oral Once per day on Mon Wed Fri     Continuous Infusions:  PRN Meds:acetaminophen, albuterol-ipratropium, artificial tears, bisacodyl, dextrose 50%, dextrose 50%, glucagon (human recombinant), glucose, glucose, heparin (porcine), insulin aspart U-100, midodrine, ondansetron, ramelteon, tiZANidine, traMADol, traZODone    Review of Systems   Constitutional: Positive for activity change, appetite change (poor) and fatigue. Negative for fever.   HENT: Negative for facial swelling and voice change.    Eyes: Negative.    Respiratory: Positive for shortness of breath (DIXON). Negative for apnea, cough, choking, chest tightness and wheezing.    Cardiovascular: Negative.  Negative for chest pain, palpitations and leg swelling.   Gastrointestinal: Positive for abdominal distention. Negative for abdominal pain, constipation, diarrhea, nausea and vomiting.   Genitourinary: Positive for decreased urine volume. Negative for difficulty urinating, dysuria, hematuria and urgency.   Musculoskeletal: Negative.  Negative for back pain, gait problem, neck pain and neck stiffness.   Skin: Positive for wound. Negative for color change and pallor.    Allergic/Immunologic: Positive for immunocompromised state.   Neurological: Positive for weakness. Negative for dizziness, seizures, light-headedness and headaches.   Psychiatric/Behavioral: Negative for behavioral problems, confusion, decreased concentration, dysphoric mood, hallucinations, sleep disturbance and suicidal ideas. The patient is not nervous/anxious.      Objective:     Vital Signs (Most Recent):  Temp: 98 °F (36.7 °C) (12/14/18 0830)  Pulse: 94 (12/14/18 1208)  Resp: (!) 28 (12/14/18 1208)  BP: 114/74 (12/14/18 1117)  SpO2: 99 % (12/14/18 1208) Vital Signs (24h Range):  Temp:  [98 °F (36.7 °C)-98.2 °F (36.8 °C)] 98 °F (36.7 °C)  Pulse:  [] 94  Resp:  [18-30] 28  SpO2:  [97 %-99 %] 99 %  BP: (114-153)/() 114/74     Weight: 70 kg (154 lb 5.2 oz)  Body mass index is 22.14 kg/m².    Intake/Output - Last 3 Shifts       12/12 0700 - 12/13 0659 12/13 0700 - 12/14 0659 12/14 0700 - 12/15 0659    P.O. 480 120     Other 600  400    Total Intake(mL/kg) 1080 (15.4) 120 (1.7) 400 (5.7)    Urine (mL/kg/hr) 25 (0) 20 (0)     Emesis/NG output 0 0     Other 2600 0 2693    Stool 0 1     Blood 0 0     Total Output 2625 21 2693    Net -1545 +99 -2293           Urine Occurrence 0 x      Stool Occurrence 0 x            Physical Exam   Constitutional: He is oriented to person, place, and time. He appears well-developed. No distress.   HENT:   Head: Normocephalic and atraumatic.   White drainage noted to back of throat and roof on mouth, voice hoarse   Eyes: Pupils are equal, round, and reactive to light. Scleral icterus is present.   Neck: Normal range of motion. Neck supple. No JVD present.   Cardiovascular: Normal rate, regular rhythm and normal heart sounds.   No murmur heard.  Pulmonary/Chest: Effort normal. No respiratory distress. He has decreased breath sounds in the right lower field and the left lower field. He has no wheezes. He exhibits no tenderness.   Taking short breaths, IS up to 500    Abdominal: Soft. Bowel sounds are normal. He exhibits no distension. There is tenderness.   Chevron inc ECTOR w/ staples  RLQ JOSE A drain and percutaneous drain site w suture CDI.    Musculoskeletal: Normal range of motion. He exhibits no tenderness. Edema: trace ankle edema.   Neurological: He is alert and oriented to person, place, and time. He has normal reflexes.   Skin: Skin is warm and dry. Capillary refill takes 2 to 3 seconds. He is not diaphoretic. No pallor.   Psychiatric: He has a normal mood and affect. His behavior is normal. Judgment and thought content normal. His affect is not labile. He is not slowed. Cognition and memory are not impaired.   Nursing note and vitals reviewed.      Laboratory:  Immunosuppressants         Stop Route Frequency     tacrolimus capsule 2 mg      -- Oral 2 times daily        CBC:   Recent Labs   Lab 12/14/18  0911   WBC 8.20   RBC 2.84*   HGB 8.3*   HCT 26.3*      MCV 93   MCH 29.2   MCHC 31.6*     CMP:   Recent Labs   Lab 12/14/18  0911   GLU 82   CALCIUM 8.3*   ALBUMIN 2.2*   PROT 5.4*      K 3.1*   CO2 25      BUN 18   CREATININE 2.6*   ALKPHOS 148*   ALT 11   AST 9*   BILITOT 1.8*     Coagulation:   Recent Labs   Lab 12/14/18  0911   INR 1.2     Tacrolimus Lvl   Date Value Ref Range Status   12/14/2018 7.5 5.0 - 15.0 ng/mL Final     Comment:     Testing performed by Liquid Chromatography-Tandem  Mass Spectrometry (LC-MS/MS).  This test was developed and its performance characteristics  determined by [facility], Department of Pathology  and Laboratory Medicine in a manner consistent with CLIA  requirements. It has not been cleared or approved by the [place]  Food and Drug Administration.  This test is used for clinical   purposes.  It should not be regarded as investigational or for  research.     12/13/2018 8.9 5.0 - 15.0 ng/mL Final     Comment:     Testing performed by Liquid Chromatography-Tandem  Mass Spectrometry (LC-MS/MS).  This test was  developed and its performance characteristics  determined by [facility], Department of Pathology  and Laboratory Medicine in a manner consistent with CLIA  requirements. It has not been cleared or approved by the [place]  Food and Drug Administration.  This test is used for clinical   purposes.  It should not be regarded as investigational or for  research.     12/12/2018 21.3 (H) 5.0 - 15.0 ng/mL Final     Comment:     Testing performed by Liquid Chromatography-Tandem  Mass Spectrometry (LC-MS/MS).  This test was developed and its performance characteristics  determined by [facility], Department of Pathology  and Laboratory Medicine in a manner consistent with CLIA  requirements. It has not been cleared or approved by the [place]  Food and Drug Administration.  This test is used for clinical   purposes.  It should not be regarded as investigational or for  research.     12/11/2018 13.8 5.0 - 15.0 ng/mL Final     Comment:     Testing performed by Liquid Chromatography-Tandem  Mass Spectrometry (LC-MS/MS).  This test was developed and its performance characteristics  determined by [facility], Department of Pathology  and Laboratory Medicine in a manner consistent with CLIA  requirements. It has not been cleared or approved by the [place]  Food and Drug Administration.  This test is used for clinical   purposes.  It should not be regarded as investigational or for  research.           Labs within the past 24 hours have been reviewed.    Diagnostic Results:  None

## 2018-12-14 NOTE — PLAN OF CARE
Problem: Occupational Therapy Goal  Goal: Occupational Therapy Goal  Goals to be met by: 12/18/18     Patient will increase functional independence with ADLs by performing:    UE Dressing with Supervision.  LE Dressing with Minimal Assistance.  Grooming while standing at sink with Stand-by Assistance.  Toileting from toilet with Minimal Assistance for hygiene and clothing management.   Toilet transfer to toilet with Stand-by Assistance.  Upper extremity  theraband exercise program x  15 reps  with independence. Met 12/9          Outcome: Ongoing (interventions implemented as appropriate)  Continue with POC. Pt making significant progress towards goals.   Julia ladd OT  12/14/2018

## 2018-12-14 NOTE — PLAN OF CARE
Problem: Patient Care Overview  Goal: Plan of Care Review  Recommendations     Recommendation/Intervention: 1. Liberalize diet to Regular.  2. Encourage multiple small meals. 3. D/c renal supplement--pt will not drink it.  Goals: 1. Pt to receive % EEN and EPN during stay.    Assessment and Plan  Severe Protein-Calorie Malnutrition  Malnutrition in the context ofChronic Illness/Injury     Related to (etiology):  Poor oral intake in the setting of ESLD and complications s/p liver transplant 11/11     Signs and Symptoms (as evidenced by):  Energy Intake: <50%of estimated energy requirement for 3 months  Body Fat Depletion: severe overall subcutaneous fat loss and depletion of orbital area as well as protruding patellas and acromion process  Muscle Mass Depletion: severe  temporal, clavicle, calf and quadricep muscle wasting   Weight Loss: 27% x 3 months

## 2018-12-14 NOTE — PROGRESS NOTES
HD treatment started. No complications with access to right chest wall catheter. Lines secured and telemetry in place. Complains of back pain.  Repositioned for comfort.

## 2018-12-14 NOTE — ASSESSMENT & PLAN NOTE
- 2 weeks for DEL prior to trnasplant  -Renal function slow to recover post-op.   - Nephrology consulted for management.   - Cont with HD as per nephrology recs.  Apprec recs.    - HD on 11/27 w/ 2L off. Pt tolerated well.    - Lasix 160 mg challenge 11/28 w/ minimal output.    - HD MWF.  - Strict I&Os.

## 2018-12-14 NOTE — PLAN OF CARE
Problem: Patient Care Overview  Goal: Plan of Care Review  Outcome: Ongoing (interventions implemented as appropriate)  Patient POD#33.  Very deconditioned, 2+ assist with ambulation.  Dialysis today 3L removed.  Vitals stable.  Patients wife 100% on self med process.  Possible SNF placement next week.

## 2018-12-14 NOTE — SUBJECTIVE & OBJECTIVE
"Interval History:   Pt reports several awakenings overnight, which he attributes to back pain.  States anxiety slightly improved by speaking aloud when he feels anxious.    Received trazodone 50 QHS x1.  No ramelteon given.      Family History     None        Tobacco Use    Smoking status: Never Smoker   Substance and Sexual Activity    Alcohol use: Not on file    Drug use: Not on file    Sexual activity: Not on file     Psychotherapeutics (From admission, onward)    Start     Stop Route Frequency Ordered    12/13/18 2100  escitalopram oxalate tablet 10 mg      -- Oral Nightly 12/13/18 1418    12/10/18 2100  traZODone tablet 50 mg      -- Oral Nightly 12/10/18 1613    12/10/18 1713  ramelteon tablet 8 mg      -- Oral Nightly PRN 12/10/18 1614    11/30/18 1233  trazodone split tablet 25 mg      -- Oral Nightly PRN 11/30/18 1150    11/14/18 1118  haloperidol lactate (HALDOL) 5 mg/mL injection     Comments:  Created by cabinet override    11/14 2329 11/14/18 1118           Review of Systems   Constitutional: Negative for fever.   Gastrointestinal: Positive for nausea.   Musculoskeletal: Positive for myalgias.   Neurological: Negative for weakness.   Psychiatric/Behavioral: Positive for sleep disturbance. Negative for confusion, decreased concentration and hallucinations. The patient is nervous/anxious.      Objective:     Vital Signs (Most Recent):  Temp: 98 °F (36.7 °C) (12/14/18 0830)  Pulse: 94 (12/14/18 1117)  Resp: (!) 28 (12/14/18 1117)  BP: 114/74 (12/14/18 1117)  SpO2: 97 % (12/14/18 1117) Vital Signs (24h Range):  Temp:  [98 °F (36.7 °C)-98.5 °F (36.9 °C)] 98 °F (36.7 °C)  Pulse:  [] 94  Resp:  [18-30] 28  SpO2:  [97 %-98 %] 97 %  BP: (114-153)/() 114/74     Height: 5' 10" (177.8 cm)  Weight: 70 kg (154 lb 5.2 oz)  Body mass index is 22.14 kg/m².      Intake/Output Summary (Last 24 hours) at 12/14/2018 1145  Last data filed at 12/14/2018 1100  Gross per 24 hour   Intake 400 ml   Output 8023 " "ml   Net -2293 ml       Physical Exam   Constitutional: He appears well-developed. No distress.   HENT:   Head: Normocephalic.   Cardiovascular: Normal rate and regular rhythm.   Pulmonary/Chest: Effort normal.   Abdominal: Soft.   Musculoskeletal: He exhibits no edema.           Mental Status Exam:  Appearance: age appropriate, lying in bed  Grooming: appropriate to situation  Arousal: awake.  Behavior/Cooperation: cooperative  Speech: Appropriate  Language: appropriate english vocabulary  Affect: normal  Thought Process: normal  Thought Content: appropriate  Associations: no loose associations noted  Orientation: month/year/day  Memory: 3/3 registration.  0/3 recall at 5 minutes.  Mood: "Not the happiest camper in your hospital"  Attention: 2/5 successful subtractions on serial 7s.  Able to spell "world" backwards.  Fund of Knowledge: Decreased  Insight: fair  Judgment: fair     Significant Labs: All pertinent labs within the past 24 hours have been reviewed.      Microbiology Results (last 7 days)     Procedure Component Value Units Date/Time    Blood culture [[id number]] Collected:  12/11/18 1211    Order Status:  Completed Specimen:  Blood Updated:  12/13/18 1412     Blood Culture, Routine No Growth to date     Blood Culture, Routine No Growth to date     Blood Culture, Routine No Growth to date    Narrative:       Draw 15 min apart    Blood culture [[id number]] Collected:  12/11/18 1211    Order Status:  Completed Specimen:  Blood Updated:  12/13/18 1412     Blood Culture, Routine No Growth to date     Blood Culture, Routine No Growth to date     Blood Culture, Routine No Growth to date    Urine culture [[id number]] Collected:  12/11/18 2122    Order Status:  Completed Specimen:  Urine, Clean Catch Updated:  12/13/18 0101     Urine Culture, Routine No growth    Fungus culture [[id number]] Collected:  11/11/18 0415    Order Status:  Completed Specimen:  Pleural Fluid from Ascites Updated:  12/11/18 1448     Fungus " (Mycology) Culture No fungus isolated after 4 weeks    Culture, Anaerobic [[id number]] Collected:  11/30/18 1617    Order Status:  Completed Specimen:  Body Fluid from Pleural Fluid Updated:  12/10/18 0711     Anaerobic Culture No anaerobes isolated    Blood culture [[id number]] Collected:  12/04/18 1232    Order Status:  Completed Specimen:  Blood Updated:  12/09/18 1412     Blood Culture, Routine No growth after 5 days.    Narrative:       Draw 15 min apart    Blood culture [[id number]] Collected:  12/04/18 1245    Order Status:  Completed Specimen:  Blood Updated:  12/09/18 1412     Blood Culture, Routine No growth after 5 days.    Narrative:       Draw 15 min apart           Significant Imaging: I have reviewed all pertinent imaging results/findings within the past 24 hours.

## 2018-12-14 NOTE — PLAN OF CARE
Problem: Patient Care Overview  Goal: Plan of Care Review  Outcome: Ongoing (interventions implemented as appropriate)  Pt is AAOx3.CBG monitored AC HS.  SS coverage given when appropriate.Standard precautions maintained.  No breakdown noted, po fluids encouraged. Pt not taking part in self care. Mother at bedside. Pt and mother educated on the need for the Pt to move while in bed to gain strength. Pt educated that with increased activity his back pain will be relieved and his appt would come back which will aid in healing. Pt remains injury and fall free, non skid footwear donned, call light within reach, personal items within reach, bed in low/locked position, pt able to voice needs all needs voiced have been met at this time.

## 2018-12-14 NOTE — PROGRESS NOTES
[facility] H[facility]ODIAL[facility]IS NOTE     Patient currently on hemodialysis for removal of uremic toxins and volume.    Patient seen and evaluated on hemodialysis, tolerating treatment, see HD flowsheet for vitals and assessments.      Ultrafiltration goal is 3L      Labs have been reviewed and the dialysate bath has been adjusted.     Assessment/Plan:     HD today for removal of uremic toxins and volume.    Please be sure patient is fluid restricted to 1000 cc daily  Will begin SARA with HD will order

## 2018-12-14 NOTE — PLAN OF CARE
Problem: Physical Therapy Goal  Goal: Physical Therapy Goal  Goals to be met by: 2018     Patient will increase functional independence with mobility by performin. Supine to sit with Modified Economy -not met  2. Sit to stand transfer with Economy -not met  3. Bed to chair transfer with independence using no AD -not met  4. Gait  x150 feet with Supervision using LRAD. -not met  5. Lower extremity exercise program x20 reps per handout, with independence -not met         Outcome: Ongoing (interventions implemented as appropriate)  Goals reviewed and remain appropriate. Pt progressing towards goals.    Ramandeep Kumar, PT, DPT   2018  [phone]

## 2018-12-14 NOTE — PT/OT/SLP PROGRESS
Occupational Therapy   Treatment    Name: Femi Enciso  MRN: [id number]  Admitting Diagnosis:  S/P liver transplant  26 Days Post-Op    Recommendations:     Discharge Recommendations: nursing facility, skilled  Discharge Equipment Recommendations:  (TBD)  Barriers to discharge:  None    Subjective     Pain/Comfort:  · Pain Rating 1: 0/10  · Pain Rating Post-Intervention 1: 0/10    Objective:     Communicated with: RN prior to session.  Patient found with all lines intact, call button in reach and RN notified and telemetry, pulse ox (continuous) upon OT entry to room.    General Precautions: Standard, fall   Orthopedic Precautions:N/A   Braces: N/A     Occupational Performance:    Bed Mobility:    · Patient completed Rolling/Turning to Right with SBA  · Patient completed Supine to Sit with contact guard assistance     Functional Mobility/Transfers:  · Patient completed Sit <> Stand Transfer with minimum assistance x1 ( one trial from EOB) and moderate assistance ( x3 trials from bedside chair) with  no assistive device   · Patient completed Bed <> Chair Transfer using Stand Pivot technique ( Pt took ~5 steps for completion) with moderate assistance x2 with no assistive device  · Functional Mobility: Pt completed functional mobility in room requiring mod x2 ( HHA); pt completed x2 trials with ~5-6 steps each trial requiring minimal seated rest break between ; second trial required increased assistance with blocking of B knees    Activities of Daily Living:  · Grooming: stand by assistance to compete oral care while seated EOB  · Upper Body Dressing: minimum assistance to milady gown like jacket while seated EOB    [facility] 6 Click ADL: 17    Treatment & Education:  -Pt edu on OT role/POC  -Importance of OOB activity with staff assistance ( UIC throughout the day/each meal)  -Safety during functional t/f and mobility  - White board updated  - Multiple self care tasks/functional mobilty completed-- assistance level noted  above  - All questions/concerns answered within OT scope of practice    Patient left up in chair with all lines intact, call button in reach and RN notified  Education:    Assessment:     Femi Enciso is a 53 y.o. male with a medical diagnosis of S/P liver transplant. Pt alert and motivated to participate with therapy.  He presents with the following performance deficits affecting function are weakness, impaired endurance, gait instability, impaired balance, impaired self care skills, impaired functional mobilty, impaired cardiopulmonary response to activity, decreased safety awareness. He has made significant progress with functional transfer/mobility and overall activity tolerance this date. He is an excellent SNF candidate to continued to progress towards goals and improve quality of life.     Rehab Prognosis:  Good; patient would benefit from acute skilled OT services to address these deficits and reach maximum level of function.       Plan:     Patient to be seen 4 x/week to address the above listed problems via self-care/home management, therapeutic activities, therapeutic exercises, neuromuscular re-education  · Plan of Care Expires: 12/21/18  · Plan of Care Reviewed with: patient, spouse    This Plan of care has been discussed with the patient who was involved in its development and understands and is in agreement with the identified goals and treatment plan    GOALS:   Multidisciplinary Problems     Occupational Therapy Goals        Problem: Occupational Therapy Goal    Goal Priority Disciplines Outcome Interventions   Occupational Therapy Goal     OT, PT/OT Ongoing (interventions implemented as appropriate)    Description:  Goals to be met by: 12/18/18     Patient will increase functional independence with ADLs by performing:    UE Dressing with Supervision.  LE Dressing with Minimal Assistance.  Grooming while standing at sink with Stand-by Assistance.  Toileting from toilet with Minimal Assistance for  hygiene and clothing management.   Toilet transfer to toilet with Stand-by Assistance.  Upper extremity  theraband exercise program x  15 reps  with independence. Met 12/9                     Multidisciplinary Problems (Resolved)        Problem: Occupational Therapy Goal    Goal Priority Disciplines Outcome Interventions   Occupational Therapy Goal   (Resolved)     OT, PT/OT Resolved for Upgrade                    Time Tracking:     OT Date of Treatment: 12/14/18  OT Start Time: 1417  OT Stop Time: 1456  OT Total Time (min): 39 min    Billable Minutes:Self Care/Home Management 14  Therapeutic Activity 25    Julia ladd OT  12/14/2018

## 2018-12-14 NOTE — PROGRESS NOTES
HD treatment complete. Duration of treatment 2 hours and 40 minutes. Treatment ended early due to system clotting. Arterial blood rinsed back. Venous blood unable to rinse back. Catheter flushed with NS and locked with Heparin. Capped and taped.

## 2018-12-14 NOTE — PROGRESS NOTES
[facility]  Liver Transplant  Progress Note    Patient Name: Femi Enciso  MRN: [id number]  Admission Date: 10/27/2018  Hospital Length of Stay: 48 days  Code Status: Full Code  Primary Care Provider: Primary Doctor No  Post-Operative Day: 33    ORGAN:   LIVER  Disease Etiology: Alcoholic Cirrhosis  Donor Type:    - Brain Death  CDC High Risk:   No  Donor CMV Status:   Donor CMV Status: Positive  Donor HBcAB:   Negative  Donor HCV Status:   Negative  Donor HBV JAVIER: Negative  Donor HCV JAVIER: Negative  Whole or Partial: Whole Liver  Biliary Anastomosis: End to End  Arterial Anatomy: Standard  Subjective:     History of Present Illness:  Femi Enciso is a 53yr old male with PMH of acute ETOH hepatitis and DEL/ATN evolved to ESRD requiring HD (not candidate for KTX). He is now s/p liver transplant (SM induction, DBD, CMV D+/R-). Transplant surgery noted severe collateralization, adrenal gland firmly adhered to liver. Bare area of adrenal gland required several stitches and packing to obtain fair hemostasis (EBL 15L). OR take back  for bleeding from R adrenal bed in AM (significant clot in retroperitoneum, posterior to R hepatic lobe, tract posterior to the R kidney containing significant clot, small bleeding area of retroperitoneal fat) then returned to surgery same day for hemorrhaging closed with wound vac with plans to return to OR 24-48 hours for closure (retroperitoneal /retrorenal/retrocolic hematoma on the right ). Raw retroperitoneal bleeding. Suture ligatures placed, argon beam cautery, evarest placed in retroperitoneum behind cava and right kidney. Significant oozing from upper right adrenal gland, not amenable to ligation, that portion of the adrenal gland was resected with cautery). OR take back  for washout and incisional closure, no significant bleeding or hematoma found. OR cultures   from body fluid grew enterococcus faecium VRE. ID was consulted,  started on  daptomycin for VRE treatment and cefepime/flagyl to cover for IA ty in bile. Patient with significant leukocytosis with peak on 11/22 at 48K prompting drainage of R subphrenic fluid collection, perc drain placed, culture grew VRE. Stroke code called 11/21 for lethargy and unresponsive, CT head without evidence of acute ischemic changes and CTA chest negative, vascular neurology was consulted recommended MRI brain, but patient's AMS improved shortly after event. Nephrology consulted for ESRD requiring HD. Patient overall improved on antibiotic regimen, leukocytosis and AMS improved. He was transferred to [facility] on POD #15.     Hospital Course:  Interval History:  No acute events overnight. Patient feeling okay today. Complaints of back pain which is chronic. Tramadol added to pain regiment. HD today, tolerated well. Continues to improve physically but slowly, will need SNF consult for placement early next week. Continue to monitor.         Scheduled Meds:   albuterol-ipratropium  3 mL Nebulization Q4H WAKE    aspirin  81 mg Oral Daily    bacitracin   Topical (Top) TID    epoetin sherlyn (PROCRIT) injection  50 Units/kg (Dosing Weight) Subcutaneous Every Mon, Wed, Fri    ergocalciferol  50,000 Units Oral Q7 Days    escitalopram oxalate  10 mg Oral Nightly    heparin (porcine)  5,000 Units Subcutaneous Q8H    isavuconazonium sulfate  372 mg Oral Daily    lidocaine  1 patch Transdermal Q24H    pantoprazole  40 mg Oral Daily    predniSONE  5 mg Oral Daily    Followed by    [START ON 12/17/2018] predniSONE  2.5 mg Oral Daily    sulfamethoxazole-trimethoprim 400-80mg  1 tablet Oral Every Mon, Wed, Fri    tacrolimus  2 mg Oral BID    traZODone  50 mg Oral QHS    ursodiol  300 mg Oral BID    valganciclovir 50 mg/ml  100 mg Oral Once per day on Mon Wed Fri     Continuous Infusions:  PRN Meds:acetaminophen, albuterol-ipratropium, artificial tears, bisacodyl, dextrose 50%, dextrose 50%, glucagon (human  recombinant), glucose, glucose, heparin (porcine), insulin aspart U-100, midodrine, ondansetron, ramelteon, tiZANidine, traMADol, traZODone    Review of Systems   Constitutional: Positive for activity change, appetite change (poor) and fatigue. Negative for fever.   HENT: Negative for facial swelling and voice change.    Eyes: Negative.    Respiratory: Positive for shortness of breath (DIXON). Negative for apnea, cough, choking, chest tightness and wheezing.    Cardiovascular: Negative.  Negative for chest pain, palpitations and leg swelling.   Gastrointestinal: Positive for abdominal distention. Negative for abdominal pain, constipation, diarrhea, nausea and vomiting.   Genitourinary: Positive for decreased urine volume. Negative for difficulty urinating, dysuria, hematuria and urgency.   Musculoskeletal: Negative.  Negative for back pain, gait problem, neck pain and neck stiffness.   Skin: Positive for wound. Negative for color change and pallor.   Allergic/Immunologic: Positive for immunocompromised state.   Neurological: Positive for weakness. Negative for dizziness, seizures, light-headedness and headaches.   Psychiatric/Behavioral: Negative for behavioral problems, confusion, decreased concentration, dysphoric mood, hallucinations, sleep disturbance and suicidal ideas. The patient is not nervous/anxious.      Objective:     Vital Signs (Most Recent):  Temp: 98 °F (36.7 °C) (12/14/18 0830)  Pulse: 94 (12/14/18 1208)  Resp: (!) 28 (12/14/18 1208)  BP: 114/74 (12/14/18 1117)  SpO2: 99 % (12/14/18 1208) Vital Signs (24h Range):  Temp:  [98 °F (36.7 °C)-98.2 °F (36.8 °C)] 98 °F (36.7 °C)  Pulse:  [] 94  Resp:  [18-30] 28  SpO2:  [97 %-99 %] 99 %  BP: (114-153)/() 114/74     Weight: 70 kg (154 lb 5.2 oz)  Body mass index is 22.14 kg/m².    Intake/Output - Last 3 Shifts       12/12 0700 - 12/13 0659 12/13 0700 - 12/14 0659 12/14 0700 - 12/15 0659    P.O. 480 120     Other 600  400    Total Intake(mL/kg)  1080 (15.4) 120 (1.7) 400 (5.7)    Urine (mL/kg/hr) 25 (0) 20 (0)     Emesis/NG output 0 0     Other 2600 0 2693    Stool 0 1     Blood 0 0     Total Output 2625 21 2693    Net -1545 +99 -2293           Urine Occurrence 0 x      Stool Occurrence 0 x            Physical Exam   Constitutional: He is oriented to person, place, and time. He appears well-developed. No distress.   HENT:   Head: Normocephalic and atraumatic.   White drainage noted to back of throat and roof on mouth, voice hoarse   Eyes: Pupils are equal, round, and reactive to light. Scleral icterus is present.   Neck: Normal range of motion. Neck supple. No JVD present.   Cardiovascular: Normal rate, regular rhythm and normal heart sounds.   No murmur heard.  Pulmonary/Chest: Effort normal. No respiratory distress. He has decreased breath sounds in the right lower field and the left lower field. He has no wheezes. He exhibits no tenderness.   Taking short breaths, IS up to 500   Abdominal: Soft. Bowel sounds are normal. He exhibits no distension. There is tenderness.   Chevron inc ECTOR w/ staples  RLQ JOSE A drain and percutaneous drain site w suture CDI.    Musculoskeletal: Normal range of motion. He exhibits no tenderness. Edema: trace ankle edema.   Neurological: He is alert and oriented to person, place, and time. He has normal reflexes.   Skin: Skin is warm and dry. Capillary refill takes 2 to 3 seconds. He is not diaphoretic. No pallor.   Psychiatric: He has a normal mood and affect. His behavior is normal. Judgment and thought content normal. His affect is not labile. He is not slowed. Cognition and memory are not impaired.   Nursing note and vitals reviewed.      Laboratory:  Immunosuppressants         Stop Route Frequency     tacrolimus capsule 2 mg      -- Oral 2 times daily        CBC:   Recent Labs   Lab 12/14/18  0911   WBC 8.20   RBC 2.84*   HGB 8.3*   HCT 26.3*      MCV 93   MCH 29.2   MCHC 31.6*     CMP:   Recent Labs   Lab  12/14/18  0911   GLU 82   CALCIUM 8.3*   ALBUMIN 2.2*   PROT 5.4*      K 3.1*   CO2 25      BUN 18   CREATININE 2.6*   ALKPHOS 148*   ALT 11   AST 9*   BILITOT 1.8*     Coagulation:   Recent Labs   Lab 12/14/18  0911   INR 1.2     Tacrolimus Lvl   Date Value Ref Range Status   12/14/2018 7.5 5.0 - 15.0 ng/mL Final     Comment:     Testing performed by Liquid Chromatography-Tandem  Mass Spectrometry (LC-MS/MS).  This test was developed and its performance characteristics  determined by [facility], Department of Pathology  and Laboratory Medicine in a manner consistent with CLIA  requirements. It has not been cleared or approved by the [place]  Food and Drug Administration.  This test is used for clinical   purposes.  It should not be regarded as investigational or for  research.     12/13/2018 8.9 5.0 - 15.0 ng/mL Final     Comment:     Testing performed by Liquid Chromatography-Tandem  Mass Spectrometry (LC-MS/MS).  This test was developed and its performance characteristics  determined by [facility], Department of Pathology  and Laboratory Medicine in a manner consistent with CLIA  requirements. It has not been cleared or approved by the [place]  Food and Drug Administration.  This test is used for clinical   purposes.  It should not be regarded as investigational or for  research.     12/12/2018 21.3 (H) 5.0 - 15.0 ng/mL Final     Comment:     Testing performed by Liquid Chromatography-Tandem  Mass Spectrometry (LC-MS/MS).  This test was developed and its performance characteristics  determined by [facility], Department of Pathology  and Laboratory Medicine in a manner consistent with CLIA  requirements. It has not been cleared or approved by the [place]  Food and Drug Administration.  This test is used for clinical   purposes.  It should not be regarded as investigational or for  research.     12/11/2018 13.8 5.0 - 15.0 ng/mL Final     Comment:     Testing performed by Liquid  Chromatography-Tandem  Mass Spectrometry (LC-MS/MS).  This test was developed and its performance characteristics  determined by [facility], Department of Pathology  and Laboratory Medicine in a manner consistent with CLIA  requirements. It has not been cleared or approved by the [place]  Food and Drug Administration.  This test is used for clinical   purposes.  It should not be regarded as investigational or for  research.           Labs within the past 24 hours have been reviewed.    Diagnostic Results:  None    Assessment/Plan:     * S/P liver transplant    - 53 year old male with history of ESLD 2/2 ETOH cirrhosis who is s/p liver transplant c/b take-back x 3 for bleeding most recently 11/18.  - LFTs now improving slowly.  T bili was 37.6 day of transplant.  - Pt remains with significant debility. Pt will need SNF placement when medically stable.   - Appetite slowly improving.  Tolerating supplements.  Encourage PO intake.   - Drain placed during take back. Drain placed to intra-abdominal abscess on 11/22.   - Fluid from collection noted with enterococcus VRE. Cont with antibxs per ID.  De escalated to PO on 11/29/18.    - Abdominal pain well controlled, and having BMs.    - HD per nephrology.   - Muscle relaxer started and will hold off on oxycodone for now.   - LFTs remain stable.        Nausea    - Intermittent, worse over past 10 days,  Changed Pepcid to Protonix 12/9/18.  Appetite seems to be slowly improving.    - Encourage multiple, small meals and cont PRN anti-emetics.  If no improvement, may need GI consult.    - GI symptoms now slowly improving.        Anxiety    - Restarted Lexapro as per psych recs, 12/13.        Moderate malnutrition    - muscle wasting noted.  Appetite on and off.  Cont supplements.  Dietician following.  Apprec recs.    - encourage multiple, small meals.     Acute renal failure with acute tubular necrosis superimposed on stage 3 chronic kidney disease    - 2 weeks for DEL  prior to trnasplant  -Renal function slow to recover post-op.   - Nephrology consulted for management.   - Cont with HD as per nephrology recs.  Apprec recs.    - HD on 11/27 w/ 2L off. Pt tolerated well.    - Lasix 160 mg challenge 11/28 w/ minimal output.    - HD MWF.  - Strict I&Os.      Intra-abdominal abscess    - Significant increase in WBC post-op.   - OR cultures from 11/18 noted with enterococcus VRE.   - Abdominal CT performed at that time with fluid collection and drain placed on 11/22 for drainage.   - Intra-abdominal abscess culture also with enterococcus VRE.   - ID consulted and pt placed on antibxs for treatment. Pt was on Cefepime, Daptomycin, and Fluconazole for treatment.    - Pt remains with RLQ drains (one JOSE A and one Percutaneous).  One JOSE A removed 11/28.  Perc drain in placed draining tan, clear drainage.  WBC slowly improving.   - Liver US on 11/26 reviewed.   - Abdominal CT performed 11/27 and reviewed. Fluid collection noted with improvement on CT.  ID following and reassured by findings.    - Dapto, Cefepime and Flagyl changed 11/30/18 to Linezolid 600 mg PO q 12 hr x 10 days per ID.     - added back cefepime after thora.  ID re consulted.  -Completed Zyvox x 10 days per ID thru 12/9/18. Monitor.      Long-term use of immunosuppressant medication    - Cont with prograf. Draw prograf level daily and adjust dose as needed to maintain a therapeutic level.        At risk for opportunistic infections    - Cont with bactrim and valcyte for protection against opportunistic infections.   - cont Isavuconazonium.     Adrenal cortical steroids causing adverse effect in therapeutic use           Prophylactic immunotherapy    - See long term immunosuppression.        Weakness    - Pt remains significantly debilitated.   - PT/OT for strengthening.   - Currently will need SNF for placement and further rehabilitation.  Insurance does not cover Rehab.  - Pt remains severely debilitated and not strong enough  for SNF at this time.   - Cont with strengthening and plan for SNF consult again next week.        Pleural effusion associated with hepatic disorder    - c/o increased pain w deep breath today to right side.  CXR showed increased opacity in the inferior hemothorax on the right side since 11/26/2018.  Pulse ox 97-99% on RA.  Cont to monitor    - continues to have fluid to RLL.  WBC remains elevated.    - thoracentesis performed on 11/30. Fluid noted with 4k WBC, 93 SEGS. cx no growth to date.  Cefepime added for treatment.  - Chest CT showing right pleural effusion improved, left w increased pleural effusion.   - Per ID, completed treatment of empyema w Cefepime thru 12/8.  - sats remain 97-99% on RA.     Type 2 diabetes mellitus without complication    - Endocrine consulted for management. Appreciate recs.   - hypoglycemia 12/10 requiring D50.    - repeat cx 12/11.       Anemia of chronic disease    - H&H stable. Monitor with daily labs.   - Chest/Abd/pelvis CT 12/4- no fluid to be drainged per transplant surgeon.  No source of bleeding.     - last liver US 11/27. Evolving peritransplant hematomas with anechoic fluid collection adjacent to the right hepatic lobe.  Small volume perihepatic free fluid.         VTE Risk Mitigation (From admission, onward)        Ordered     heparin (porcine) injection 1,000 Units  As needed (PRN)      12/12/18 1731     heparin (porcine) injection 5,000 Units  Every 8 hours      11/30/18 1149     IP VTE HIGH RISK PATIENT  Once      11/11/18 1208          The patients clinical status was discussed at multidisplinary rounds, involving transplant surgery, transplant medicine, pharmacy, nursing, nutrition, and social work    Discharge Planning: Not a candidate for discharge at this time.   No Patient Care Coordination Note on file.      Manda Jackson PA-C  Liver Transplant  [facility]
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
## 2018-12-15 LAB
ALBUMIN SERPL BCP-MCNC: 2.3 G/DL
ALP SERPL-CCNC: 155 U/L
ALT SERPL W/O P-5'-P-CCNC: 12 U/L
ANION GAP SERPL CALC-SCNC: 9 MMOL/L
AST SERPL-CCNC: 10 U/L
BASOPHILS # BLD AUTO: 0.23 K/UL
BASOPHILS NFR BLD: 2.5 %
BILIRUB SERPL-MCNC: 1.7 MG/DL
BUN SERPL-MCNC: 14 MG/DL
CALCIUM SERPL-MCNC: 8.5 MG/DL
CHLORIDE SERPL-SCNC: 105 MMOL/L
CO2 SERPL-SCNC: 23 MMOL/L
CREAT SERPL-MCNC: 2.3 MG/DL
DIFFERENTIAL METHOD: ABNORMAL
EOSINOPHIL # BLD AUTO: 0.2 K/UL
EOSINOPHIL NFR BLD: 2.1 %
ERYTHROCYTE [DISTWIDTH] IN BLOOD BY AUTOMATED COUNT: 15.8 %
EST. GFR  (AFRICAN AMERICAN): 36.1 ML/MIN/1.73 M^2
EST. GFR  (NON AFRICAN AMERICAN): 31.3 ML/MIN/1.73 M^2
FERRITIN SERPL-MCNC: 2033 NG/ML
GLUCOSE SERPL-MCNC: 86 MG/DL
HCT VFR BLD AUTO: 27.7 %
HGB BLD-MCNC: 8.2 G/DL
IMM GRANULOCYTES # BLD AUTO: 0.06 K/UL
IMM GRANULOCYTES NFR BLD AUTO: 0.7 %
INR PPP: 1.2
LYMPHOCYTES # BLD AUTO: 0.7 K/UL
LYMPHOCYTES NFR BLD: 7.6 %
MAGNESIUM SERPL-MCNC: 1.6 MG/DL
MCH RBC QN AUTO: 28.2 PG
MCHC RBC AUTO-ENTMCNC: 29.6 G/DL
MCV RBC AUTO: 95 FL
MONOCYTES # BLD AUTO: 1.2 K/UL
MONOCYTES NFR BLD: 12.7 %
NEUTROPHILS # BLD AUTO: 6.7 K/UL
NEUTROPHILS NFR BLD: 74.4 %
NRBC BLD-RTO: 0 /100 WBC
PHOSPHATE SERPL-MCNC: 2.3 MG/DL
PLATELET # BLD AUTO: 387 K/UL
PMV BLD AUTO: 11.1 FL
POCT GLUCOSE: 100 MG/DL (ref 70–110)
POCT GLUCOSE: 102 MG/DL (ref 70–110)
POCT GLUCOSE: 171 MG/DL (ref 70–110)
POCT GLUCOSE: 76 MG/DL (ref 70–110)
POTASSIUM SERPL-SCNC: 4 MMOL/L
PROT SERPL-MCNC: 5.5 G/DL
PROTHROMBIN TIME: 12 SEC
RBC # BLD AUTO: 2.91 M/UL
SODIUM SERPL-SCNC: 137 MMOL/L
TACROLIMUS BLD-MCNC: 7.1 NG/ML
WBC # BLD AUTO: 9.04 K/UL

## 2018-12-15 PROCEDURE — 25000003 PHARM REV CODE 250: Performed by: STUDENT IN AN ORGANIZED HEALTH CARE EDUCATION/TRAINING PROGRAM

## 2018-12-15 PROCEDURE — 94761 N-INVAS EAR/PLS OXIMETRY MLT: CPT

## 2018-12-15 PROCEDURE — 85025 COMPLETE CBC W/AUTO DIFF WBC: CPT

## 2018-12-15 PROCEDURE — 94640 AIRWAY INHALATION TREATMENT: CPT

## 2018-12-15 PROCEDURE — 94664 DEMO&/EVAL PT USE INHALER: CPT

## 2018-12-15 PROCEDURE — 80197 ASSAY OF TACROLIMUS: CPT

## 2018-12-15 PROCEDURE — 63600175 PHARM REV CODE 636 W HCPCS: Performed by: NURSE PRACTITIONER

## 2018-12-15 PROCEDURE — 99233 SBSQ HOSP IP/OBS HIGH 50: CPT | Mod: 24,,, | Performed by: NURSE PRACTITIONER

## 2018-12-15 PROCEDURE — 25000003 PHARM REV CODE 250: Performed by: NURSE PRACTITIONER

## 2018-12-15 PROCEDURE — 25000242 PHARM REV CODE 250 ALT 637 W/ HCPCS: Performed by: NURSE PRACTITIONER

## 2018-12-15 PROCEDURE — 20600001 HC STEP DOWN PRIVATE ROOM

## 2018-12-15 PROCEDURE — 82728 ASSAY OF FERRITIN: CPT

## 2018-12-15 PROCEDURE — 99233 PR SUBSEQUENT HOSPITAL CARE,LEVL III: ICD-10-PCS | Mod: 24,,, | Performed by: NURSE PRACTITIONER

## 2018-12-15 PROCEDURE — 63600175 PHARM REV CODE 636 W HCPCS: Performed by: STUDENT IN AN ORGANIZED HEALTH CARE EDUCATION/TRAINING PROGRAM

## 2018-12-15 PROCEDURE — 27000646 HC AEROBIKA DEVICE

## 2018-12-15 PROCEDURE — 84100 ASSAY OF PHOSPHORUS: CPT

## 2018-12-15 PROCEDURE — 99900035 HC TECH TIME PER 15 MIN (STAT)

## 2018-12-15 PROCEDURE — 83735 ASSAY OF MAGNESIUM: CPT

## 2018-12-15 PROCEDURE — 25000003 PHARM REV CODE 250: Performed by: PHYSICIAN ASSISTANT

## 2018-12-15 PROCEDURE — 80053 COMPREHEN METABOLIC PANEL: CPT

## 2018-12-15 PROCEDURE — 85610 PROTHROMBIN TIME: CPT

## 2018-12-15 RX ADMIN — BACITRACIN: 500 OINTMENT TOPICAL at 09:12

## 2018-12-15 RX ADMIN — URSODIOL 300 MG: 300 CAPSULE ORAL at 09:12

## 2018-12-15 RX ADMIN — TRAZODONE HYDROCHLORIDE 25 MG: 50 TABLET ORAL at 09:12

## 2018-12-15 RX ADMIN — PREDNISONE 5 MG: 5 TABLET ORAL at 09:12

## 2018-12-15 RX ADMIN — LIDOCAINE 1 PATCH: 50 PATCH CUTANEOUS at 05:12

## 2018-12-15 RX ADMIN — HEPARIN SODIUM 5000 UNITS: 5000 INJECTION, SOLUTION INTRAVENOUS; SUBCUTANEOUS at 09:12

## 2018-12-15 RX ADMIN — TIZANIDINE 2 MG: 2 TABLET ORAL at 09:12

## 2018-12-15 RX ADMIN — ISAVUCONAZONIUM SULFATE 372 MG: 186 CAPSULE ORAL at 09:12

## 2018-12-15 RX ADMIN — HEPARIN SODIUM 5000 UNITS: 5000 INJECTION, SOLUTION INTRAVENOUS; SUBCUTANEOUS at 05:12

## 2018-12-15 RX ADMIN — IPRATROPIUM BROMIDE AND ALBUTEROL SULFATE 3 ML: .5; 3 SOLUTION RESPIRATORY (INHALATION) at 08:12

## 2018-12-15 RX ADMIN — TACROLIMUS 2 MG: 1 CAPSULE ORAL at 09:12

## 2018-12-15 RX ADMIN — TRAZODONE HYDROCHLORIDE 50 MG: 50 TABLET ORAL at 09:12

## 2018-12-15 RX ADMIN — ONDANSETRON 4 MG: 2 INJECTION INTRAMUSCULAR; INTRAVENOUS at 08:12

## 2018-12-15 RX ADMIN — BACITRACIN: 500 OINTMENT TOPICAL at 03:12

## 2018-12-15 RX ADMIN — TACROLIMUS 2 MG: 1 CAPSULE ORAL at 05:12

## 2018-12-15 RX ADMIN — RAMELTEON 8 MG: 8 TABLET, FILM COATED ORAL at 09:12

## 2018-12-15 RX ADMIN — ESCITALOPRAM OXALATE 10 MG: 10 TABLET ORAL at 09:12

## 2018-12-15 RX ADMIN — ASPIRIN 81 MG CHEWABLE TABLET 81 MG: 81 TABLET CHEWABLE at 09:12

## 2018-12-15 RX ADMIN — PANTOPRAZOLE SODIUM 40 MG: 40 TABLET, DELAYED RELEASE ORAL at 09:12

## 2018-12-15 RX ADMIN — IPRATROPIUM BROMIDE AND ALBUTEROL SULFATE 3 ML: .5; 3 SOLUTION RESPIRATORY (INHALATION) at 11:12

## 2018-12-15 RX ADMIN — TRAMADOL HYDROCHLORIDE 50 MG: 50 TABLET, FILM COATED ORAL at 10:12

## 2018-12-15 RX ADMIN — HEPARIN SODIUM 5000 UNITS: 5000 INJECTION, SOLUTION INTRAVENOUS; SUBCUTANEOUS at 01:12

## 2018-12-15 RX ADMIN — IPRATROPIUM BROMIDE AND ALBUTEROL SULFATE 3 ML: .5; 3 SOLUTION RESPIRATORY (INHALATION) at 03:12

## 2018-12-15 NOTE — PROGRESS NOTES
[facility]  Liver Transplant  Progress Note    Patient Name: Femi Enciso  MRN: [id number]  Admission Date: 10/27/2018  Hospital Length of Stay: 49 days  Code Status: Full Code  Primary Care Provider: Primary Doctor No  Post-Operative Day: 34    ORGAN:   LIVER  Disease Etiology: Alcoholic Cirrhosis  Donor Type:    - Brain Death  CDC High Risk:   No  Donor CMV Status:   Donor CMV Status: Positive  Donor HBcAB:   Negative  Donor HCV Status:   Negative  Donor HBV JAVIER: Negative  Donor HCV JAVIER: Negative  Whole or Partial: Whole Liver  Biliary Anastomosis: End to End  Arterial Anatomy: Standard  Subjective:     History of Present Illness:  Femi Enciso is a 53yr old male with PMH of acute ETOH hepatitis and DEL/ATN evolved to ESRD requiring HD (not candidate for KTX). He is now s/p liver transplant (SM induction, DBD, CMV D+/R-). Transplant surgery noted severe collateralization, adrenal gland firmly adhered to liver. Bare area of adrenal gland required several stitches and packing to obtain fair hemostasis (EBL 15L). OR take back  for bleeding from R adrenal bed in AM (significant clot in retroperitoneum, posterior to R hepatic lobe, tract posterior to the R kidney containing significant clot, small bleeding area of retroperitoneal fat) then returned to surgery same day for hemorrhaging closed with wound vac with plans to return to OR 24-48 hours for closure (retroperitoneal /retrorenal/retrocolic hematoma on the right ). Raw retroperitoneal bleeding. Suture ligatures placed, argon beam cautery, evarest placed in retroperitoneum behind cava and right kidney. Significant oozing from upper right adrenal gland, not amenable to ligation, that portion of the adrenal gland was resected with cautery). OR take back  for washout and incisional closure, no significant bleeding or hematoma found. OR cultures   from body fluid grew enterococcus faecium VRE. ID was consulted,  started on  daptomycin for VRE treatment and cefepime/flagyl to cover for IA ty in bile. Patient with significant leukocytosis with peak on 11/22 at 48K prompting drainage of R subphrenic fluid collection, perc drain placed, culture grew VRE. Stroke code called 11/21 for lethargy and unresponsive, CT head without evidence of acute ischemic changes and CTA chest negative, vascular neurology was consulted recommended MRI brain, but patient's AMS improved shortly after event. Nephrology consulted for ESRD requiring HD. Patient overall improved on antibiotic regimen, leukocytosis and AMS improved. He was transferred to T[facility] on POD #15.     Hospital Course:  Interval History:  Pt feeling ok. With c/o chronic back pain relieved with current pain regimen. Tolerated HD well yesterday. Continue aggressive PT/OT. Plan for SNF placement early next week. Monitor.       Scheduled Meds:   albuterol-ipratropium  3 mL Nebulization Q4H WAKE    aspirin  81 mg Oral Daily    bacitracin   Topical (Top) TID    epoetin sherlyn (PROCRIT) injection  50 Units/kg (Dosing Weight) Subcutaneous Every Mon, Wed, Fri    ergocalciferol  50,000 Units Oral Q7 Days    escitalopram oxalate  10 mg Oral Nightly    heparin (porcine)  5,000 Units Subcutaneous Q8H    isavuconazonium sulfate  372 mg Oral Daily    lidocaine  1 patch Transdermal Q24H    pantoprazole  40 mg Oral Daily    predniSONE  5 mg Oral Daily    Followed by    [START ON 12/17/2018] predniSONE  2.5 mg Oral Daily    sulfamethoxazole-trimethoprim 400-80mg  1 tablet Oral Every Mon, Wed, Fri    tacrolimus  2 mg Oral BID    traZODone  50 mg Oral QHS    ursodiol  300 mg Oral BID    valganciclovir 50 mg/ml  100 mg Oral Once per day on Mon Wed Fri     Continuous Infusions:  PRN Meds:acetaminophen, albuterol-ipratropium, artificial tears, bisacodyl, dextrose 50%, dextrose 50%, glucagon (human recombinant), glucose, glucose, heparin (porcine), insulin aspart U-100, midodrine, ondansetron,  ramelteon, tiZANidine, traMADol, traZODone    Review of Systems   Constitutional: Positive for activity change, appetite change (poor) and fatigue. Negative for fever.   HENT: Negative for congestion, facial swelling and voice change.    Eyes: Negative for pain, discharge and visual disturbance.   Respiratory: Positive for shortness of breath (DIXON). Negative for apnea, cough, choking, chest tightness and wheezing.    Cardiovascular: Negative.  Negative for chest pain, palpitations and leg swelling.   Gastrointestinal: Positive for abdominal distention. Negative for abdominal pain, constipation, diarrhea, nausea and vomiting.   Endocrine: Negative.    Genitourinary: Positive for decreased urine volume. Negative for difficulty urinating, dysuria, hematuria and urgency.   Musculoskeletal: Negative.  Negative for back pain, gait problem, neck pain and neck stiffness.   Skin: Positive for wound. Negative for color change and pallor.   Allergic/Immunologic: Positive for immunocompromised state.   Neurological: Positive for weakness. Negative for dizziness, seizures, light-headedness and headaches.   Psychiatric/Behavioral: Negative for behavioral problems, confusion, decreased concentration, dysphoric mood, hallucinations, sleep disturbance and suicidal ideas. The patient is not nervous/anxious.      Objective:     Vital Signs (Most Recent):  Temp: 98 °F (36.7 °C) (12/15/18 1118)  Pulse: 94 (12/15/18 1132)  Resp: (!) 26 (12/15/18 1132)  BP: (!) 145/97 (12/15/18 1118)  SpO2: 98 % (12/15/18 1118) Vital Signs (24h Range):  Temp:  [97.9 °F (36.6 °C)-98.7 °F (37.1 °C)] 98 °F (36.7 °C)  Pulse:  [84-98] 94  Resp:  [18-28] 26  SpO2:  [97 %-99 %] 98 %  BP: (115-145)/(79-97) 145/97     Weight: 70 kg (154 lb 5.2 oz)  Body mass index is 22.14 kg/m².    Intake/Output - Last 3 Shifts       12/13 0700 - 12/14 0659 12/14 0700 - 12/15 0659 12/15 0700 - 12/16 0659    P.O. 120 835 100    Other  400     Total Intake(mL/kg) 120 (1.7) 1235  (17.6) 100 (1.4)    Urine (mL/kg/hr) 20 (0) 0 (0)     Emesis/NG output 0 0     Other 0 2693     Stool 1 0     Blood 0 0     Total Output 21 2693     Net +99 -1458 +100                 Physical Exam   Constitutional: He is oriented to person, place, and time. He appears well-developed. No distress.   Temporal and distal extremity muscle wasting noted.   HENT:   Head: Normocephalic and atraumatic.   White drainage noted to back of throat and roof on mouth, voice hoarse   Eyes: Pupils are equal, round, and reactive to light. Scleral icterus is present.   Neck: Normal range of motion. Neck supple. No JVD present.   Cardiovascular: Normal rate, regular rhythm and normal heart sounds.   No murmur heard.  Pulmonary/Chest: Effort normal. No respiratory distress. He has decreased breath sounds in the right lower field and the left lower field. He has no wheezes. He exhibits no tenderness.   Taking short breaths, IS up to 500   Abdominal: Soft. Bowel sounds are normal. He exhibits no distension. There is tenderness.   Chevron inc ECTOR w/ staples  RLQ JOSE A drain and percutaneous drain site w suture CDI.    Musculoskeletal: Normal range of motion. He exhibits no tenderness. Edema: trace ankle edema.   Neurological: He is alert and oriented to person, place, and time. He has normal reflexes.   Skin: Skin is warm and dry. Capillary refill takes 2 to 3 seconds. He is not diaphoretic. No pallor.   Psychiatric: He has a normal mood and affect. His behavior is normal. Judgment and thought content normal. His affect is not labile. He is not slowed. Cognition and memory are not impaired.   Nursing note and vitals reviewed.      Laboratory:  Immunosuppressants         Stop Route Frequency     tacrolimus capsule 2 mg      -- Oral 2 times daily        CBC:   Recent Labs   Lab 12/15/18  0606   WBC 9.04   RBC 2.91*   HGB 8.2*   HCT 27.7*   *   MCV 95   MCH 28.2   MCHC 29.6*     CMP:   Recent Labs   Lab 12/15/18  0606   GLU 86   CALCIUM  8.5*   ALBUMIN 2.3*   PROT 5.5*      K 4.0   CO2 23      BUN 14   CREATININE 2.3*   ALKPHOS 155*   ALT 12   AST 10   BILITOT 1.7*     Labs within the past 24 hours have been reviewed.    Diagnostic Results:  I have personally reviewed all pertinent imaging studies.    Assessment/Plan:     * S/P liver transplant    - 53 year old male with history of ESLD 2/2 ETOH cirrhosis who is s/p liver transplant c/b take-back x 3 for bleeding most recently 11/18.  - LFTs now improving slowly.  T bili was 37.6 day of transplant.  - Pt remains with significant debility. Pt will need SNF placement when medically stable.   - Appetite slowly improving.  Tolerating supplements.  Encourage PO intake.   - Drain placed during take back. Drain placed to intra-abdominal abscess on 11/22.   - Fluid from collection noted with enterococcus VRE. Cont with antibxs per ID.  De escalated to PO on 11/29/18.    - Abdominal pain well controlled, and having BMs.    - HD per nephrology. MWF. Tolerated well 12/14.  - Muscle relaxer started and will hold off on oxycodone for now.   - LFTs remain stable.        Nausea    - Intermittent, worse over past 10 days,  Changed Pepcid to Protonix 12/9/18.  Appetite seems to be slowly improving.    - Encourage multiple, small meals and cont PRN anti-emetics.  If no improvement, may need GI consult.    - GI symptoms now slowly improving.        Anxiety    - Restarted Lexapro as per psych recs, 12/13.        Moderate malnutrition    - muscle wasting noted.  Appetite on and off.  Cont supplements.  Dietician following.  Apprec recs.    - encourage multiple, small meals.     Acute renal failure with acute tubular necrosis superimposed on stage 3 chronic kidney disease    - 2 weeks for DEL prior to trnasplant  - Renal function slow to recover post-op.   - Nephrology consulted for management.   - Cont with HD as per nephrology recs.  Apprec recs.    - HD on 11/27 w/ 2L off. Pt tolerated well.    - Lasix 160  mg challenge 11/28 w/ minimal output.    - HD MWF. Tolerating well, last HD 12/14.  - Strict I&Os.      Intra-abdominal abscess    - Significant increase in WBC post-op.   - OR cultures from 11/18 noted with enterococcus VRE.   - Abdominal CT performed at that time with fluid collection and drain placed on 11/22 for drainage.   - Intra-abdominal abscess culture also with enterococcus VRE.   - ID consulted and pt placed on antibxs for treatment. Pt was on Cefepime, Daptomycin, and Fluconazole for treatment.    - Pt remains with RLQ drains (one JOSE A and one Percutaneous).  One JOSE A removed 11/28.  Perc drain in placed draining tan, clear drainage.  WBC slowly improving.   - Liver US on 11/26 reviewed.   - Abdominal CT performed 11/27 and reviewed. Fluid collection noted with improvement on CT.  ID following and reassured by findings.    - Dapto, Cefepime and Flagyl changed 11/30/18 to Linezolid 600 mg PO q 12 hr x 10 days per ID.     - added back cefepime after thora.  ID re consulted.  - Completed Zyvox x 10 days per ID thru 12/9/18. Monitor.      Long-term use of immunosuppressant medication    - Cont with prograf. Draw prograf level daily and adjust dose as needed to maintain a therapeutic level.        At risk for opportunistic infections    - Cont with bactrim and valcyte for protection against opportunistic infections.   - cont Isavuconazonium.     Adrenal cortical steroids causing adverse effect in therapeutic use           Prophylactic immunotherapy    - See long term immunosuppression.        Weakness    - Pt remains significantly debilitated.   - PT/OT for strengthening.   - Currently will need SNF for placement and further rehabilitation.  Insurance does not cover Rehab.  - Pt remains severely debilitated and not strong enough for SNF at this time.   - Cont with strengthening and plan for SNF consult again next week.        Pleural effusion associated with hepatic disorder    - c/o increased pain w deep breath  today to right side.  CXR showed increased opacity in the inferior hemothorax on the right side since 11/26/2018.  Pulse ox 97-99% on RA.  Cont to monitor.   - continues to have fluid to RLL.  WBC remains elevated.    - thoracentesis performed on 11/30. Fluid noted with 4k WBC, 93 SEGS. cx no growth to date.  Cefepime added for treatment.  - Chest CT showing right pleural effusion improved, left w increased pleural effusion.   - Per ID, completed treatment of empyema w Cefepime thru 12/8.  - sats remain 97-99% on RA.     Type 2 diabetes mellitus without complication    - Endocrine consulted for management. Appreciate recs.   - hypoglycemia 12/10 requiring D50.    - repeat cx 12/11.       Anemia of chronic disease    - H&H stable. Monitor with daily labs.   - Chest/Abd/pelvis CT 12/4- no fluid to be drainged per transplant surgeon.  No source of bleeding.     - last liver US 11/27. Evolving peritransplant hematomas with anechoic fluid collection adjacent to the right hepatic lobe.  Small volume perihepatic free fluid.         VTE Risk Mitigation (From admission, onward)        Ordered     heparin (porcine) injection 1,000 Units  As needed (PRN)      12/12/18 1731     heparin (porcine) injection 5,000 Units  Every 8 hours      11/30/18 1149     IP VTE HIGH RISK PATIENT  Once      11/11/18 1208          The patients clinical status was discussed at multidisplinary rounds, involving transplant surgery, transplant medicine, pharmacy, nursing, nutrition, and social work    Discharge Planning: not a candidate for dc at this time.       Pamela Shirley NP  Liver Transplant  [facility]

## 2018-12-15 NOTE — PLAN OF CARE
Problem: Patient Care Overview  Goal: Plan of Care Review  Outcome: Ongoing (interventions implemented as appropriate)  Pt is AAOx3.Pt is in good spirits.  NAD noted.Pt denies pian and/or discomfort at this time.No breakdown noted, po fluids encouraged.Standard precautions maintained.  Pt remains injury and fall free, non skid footwear donned, call light within reach, personal items within reach, bed in low/locked position, pt able to voice needs all needs voiced have been met at this time.

## 2018-12-15 NOTE — ASSESSMENT & PLAN NOTE
- Significant increase in WBC post-op.   - OR cultures from 11/18 noted with enterococcus VRE.   - Abdominal CT performed at that time with fluid collection and drain placed on 11/22 for drainage.   - Intra-abdominal abscess culture also with enterococcus VRE.   - ID consulted and pt placed on antibxs for treatment. Pt was on Cefepime, Daptomycin, and Fluconazole for treatment.    - Pt remains with RLQ drains (one JOSE A and one Percutaneous).  One JOSE A removed 11/28.  Perc drain in placed draining tan, clear drainage.  WBC slowly improving.   - Liver US on 11/26 reviewed.   - Abdominal CT performed 11/27 and reviewed. Fluid collection noted with improvement on CT.  ID following and reassured by findings.    - Dapto, Cefepime and Flagyl changed 11/30/18 to Linezolid 600 mg PO q 12 hr x 10 days per ID.     - added back cefepime after thora.  ID re consulted.  - Completed Zyvox x 10 days per ID thru 12/9/18. Monitor.

## 2018-12-15 NOTE — SUBJECTIVE & OBJECTIVE
Scheduled Meds:   albuterol-ipratropium  3 mL Nebulization Q4H WAKE    aspirin  81 mg Oral Daily    bacitracin   Topical (Top) TID    epoetin sherlyn (PROCRIT) injection  50 Units/kg (Dosing Weight) Subcutaneous Every Mon, Wed, Fri    ergocalciferol  50,000 Units Oral Q7 Days    escitalopram oxalate  10 mg Oral Nightly    heparin (porcine)  5,000 Units Subcutaneous Q8H    isavuconazonium sulfate  372 mg Oral Daily    lidocaine  1 patch Transdermal Q24H    pantoprazole  40 mg Oral Daily    predniSONE  5 mg Oral Daily    Followed by    [START ON 12/17/2018] predniSONE  2.5 mg Oral Daily    sulfamethoxazole-trimethoprim 400-80mg  1 tablet Oral Every Mon, Wed, Fri    tacrolimus  2 mg Oral BID    traZODone  50 mg Oral QHS    ursodiol  300 mg Oral BID    valganciclovir 50 mg/ml  100 mg Oral Once per day on Mon Wed Fri     Continuous Infusions:  PRN Meds:acetaminophen, albuterol-ipratropium, artificial tears, bisacodyl, dextrose 50%, dextrose 50%, glucagon (human recombinant), glucose, glucose, heparin (porcine), insulin aspart U-100, midodrine, ondansetron, ramelteon, tiZANidine, traMADol, traZODone    Review of Systems   Constitutional: Positive for activity change, appetite change (poor) and fatigue. Negative for fever.   HENT: Negative for congestion, facial swelling and voice change.    Eyes: Negative for pain, discharge and visual disturbance.   Respiratory: Positive for shortness of breath (DIXON). Negative for apnea, cough, choking, chest tightness and wheezing.    Cardiovascular: Negative.  Negative for chest pain, palpitations and leg swelling.   Gastrointestinal: Positive for abdominal distention. Negative for abdominal pain, constipation, diarrhea, nausea and vomiting.   Endocrine: Negative.    Genitourinary: Positive for decreased urine volume. Negative for difficulty urinating, dysuria, hematuria and urgency.   Musculoskeletal: Negative.  Negative for back pain, gait problem, neck pain and neck  stiffness.   Skin: Positive for wound. Negative for color change and pallor.   Allergic/Immunologic: Positive for immunocompromised state.   Neurological: Positive for weakness. Negative for dizziness, seizures, light-headedness and headaches.   Psychiatric/Behavioral: Negative for behavioral problems, confusion, decreased concentration, dysphoric mood, hallucinations, sleep disturbance and suicidal ideas. The patient is not nervous/anxious.      Objective:     Vital Signs (Most Recent):  Temp: 98 °F (36.7 °C) (12/15/18 1118)  Pulse: 94 (12/15/18 1132)  Resp: (!) 26 (12/15/18 1132)  BP: (!) 145/97 (12/15/18 1118)  SpO2: 98 % (12/15/18 1118) Vital Signs (24h Range):  Temp:  [97.9 °F (36.6 °C)-98.7 °F (37.1 °C)] 98 °F (36.7 °C)  Pulse:  [84-98] 94  Resp:  [18-28] 26  SpO2:  [97 %-99 %] 98 %  BP: (115-145)/(79-97) 145/97     Weight: 70 kg (154 lb 5.2 oz)  Body mass index is 22.14 kg/m².    Intake/Output - Last 3 Shifts       12/13 0700 - 12/14 0659 12/14 0700 - 12/15 0659 12/15 0700 - 12/16 0659    P.O. 120 835 100    Other  400     Total Intake(mL/kg) 120 (1.7) 1235 (17.6) 100 (1.4)    Urine (mL/kg/hr) 20 (0) 0 (0)     Emesis/NG output 0 0     Other 0 2693     Stool 1 0     Blood 0 0     Total Output 21 2693     Net +99 -1458 +100                 Physical Exam   Constitutional: He is oriented to person, place, and time. He appears well-developed. No distress.   Temporal and distal extremity muscle wasting noted.   HENT:   Head: Normocephalic and atraumatic.   White drainage noted to back of throat and roof on mouth, voice hoarse   Eyes: Pupils are equal, round, and reactive to light. Scleral icterus is present.   Neck: Normal range of motion. Neck supple. No JVD present.   Cardiovascular: Normal rate, regular rhythm and normal heart sounds.   No murmur heard.  Pulmonary/Chest: Effort normal. No respiratory distress. He has decreased breath sounds in the right lower field and the left lower field. He has no wheezes. He  exhibits no tenderness.   Taking short breaths, IS up to 500   Abdominal: Soft. Bowel sounds are normal. He exhibits no distension. There is tenderness.   Chevron inc ECTOR w/ staples  RLQ JOSE A drain and percutaneous drain site w suture CDI.    Musculoskeletal: Normal range of motion. He exhibits no tenderness. Edema: trace ankle edema.   Neurological: He is alert and oriented to person, place, and time. He has normal reflexes.   Skin: Skin is warm and dry. Capillary refill takes 2 to 3 seconds. He is not diaphoretic. No pallor.   Psychiatric: He has a normal mood and affect. His behavior is normal. Judgment and thought content normal. His affect is not labile. He is not slowed. Cognition and memory are not impaired.   Nursing note and vitals reviewed.      Laboratory:  Immunosuppressants         Stop Route Frequency     tacrolimus capsule 2 mg      -- Oral 2 times daily        CBC:   Recent Labs   Lab 12/15/18  0606   WBC 9.04   RBC 2.91*   HGB 8.2*   HCT 27.7*   *   MCV 95   MCH 28.2   MCHC 29.6*     CMP:   Recent Labs   Lab 12/15/18  0606   GLU 86   CALCIUM 8.5*   ALBUMIN 2.3*   PROT 5.5*      K 4.0   CO2 23      BUN 14   CREATININE 2.3*   ALKPHOS 155*   ALT 12   AST 10   BILITOT 1.7*     Labs within the past 24 hours have been reviewed.    Diagnostic Results:  I have personally reviewed all pertinent imaging studies.

## 2018-12-15 NOTE — ASSESSMENT & PLAN NOTE
- 2 weeks for DEL prior to trnasplant  - Renal function slow to recover post-op.   - Nephrology consulted for management.   - Cont with HD as per nephrology recs.  Apprec recs.    - HD on 11/27 w/ 2L off. Pt tolerated well.    - Lasix 160 mg challenge 11/28 w/ minimal output.    - HD MWF. Tolerating well, last HD 12/14.  - Strict I&Os.

## 2018-12-15 NOTE — ASSESSMENT & PLAN NOTE
- 53 year old male with history of ESLD 2/2 ETOH cirrhosis who is s/p liver transplant c/b take-back x 3 for bleeding most recently 11/18.  - LFTs now improving slowly.  T bili was 37.6 day of transplant.  - Pt remains with significant debility. Pt will need SNF placement when medically stable.   - Appetite slowly improving.  Tolerating supplements.  Encourage PO intake.   - Drain placed during take back. Drain placed to intra-abdominal abscess on 11/22.   - Fluid from collection noted with enterococcus VRE. Cont with antibxs per ID.  De escalated to PO on 11/29/18.    - Abdominal pain well controlled, and having BMs.    - HD per nephrology. MWF. Tolerated well 12/14.  - Muscle relaxer started and will hold off on oxycodone for now.   - LFTs remain stable.

## 2018-12-15 NOTE — ASSESSMENT & PLAN NOTE
- c/o increased pain w deep breath today to right side.  CXR showed increased opacity in the inferior hemothorax on the right side since 11/26/2018.  Pulse ox 97-99% on RA.  Cont to monitor.   - continues to have fluid to RLL.  WBC remains elevated.    - thoracentesis performed on 11/30. Fluid noted with 4k WBC, 93 SEGS. cx no growth to date.  Cefepime added for treatment.  - Chest CT showing right pleural effusion improved, left w increased pleural effusion.   - Per ID, completed treatment of empyema w Cefepime thru 12/8.  - sats remain 97-99% on RA.

## 2018-12-16 PROBLEM — E83.42 HYPOMAGNESEMIA: Status: ACTIVE | Noted: 2018-12-16

## 2018-12-16 LAB
ALBUMIN SERPL BCP-MCNC: 2 G/DL
ALP SERPL-CCNC: 154 U/L
ALT SERPL W/O P-5'-P-CCNC: 12 U/L
ANION GAP SERPL CALC-SCNC: 7 MMOL/L
AST SERPL-CCNC: 8 U/L
BACTERIA BLD CULT: NORMAL
BACTERIA BLD CULT: NORMAL
BASOPHILS # BLD AUTO: 0.17 K/UL
BASOPHILS NFR BLD: 2 %
BILIRUB SERPL-MCNC: 1.5 MG/DL
BUN SERPL-MCNC: 19 MG/DL
CALCIUM SERPL-MCNC: 8.5 MG/DL
CHLORIDE SERPL-SCNC: 105 MMOL/L
CO2 SERPL-SCNC: 25 MMOL/L
CREAT SERPL-MCNC: 2.8 MG/DL
DIFFERENTIAL METHOD: ABNORMAL
EOSINOPHIL # BLD AUTO: 0.3 K/UL
EOSINOPHIL NFR BLD: 3 %
ERYTHROCYTE [DISTWIDTH] IN BLOOD BY AUTOMATED COUNT: 15.7 %
EST. GFR  (AFRICAN AMERICAN): 28.5 ML/MIN/1.73 M^2
EST. GFR  (NON AFRICAN AMERICAN): 24.6 ML/MIN/1.73 M^2
GLUCOSE SERPL-MCNC: 87 MG/DL
HCT VFR BLD AUTO: 26.9 %
HGB BLD-MCNC: 8.4 G/DL
IMM GRANULOCYTES # BLD AUTO: 0.06 K/UL
IMM GRANULOCYTES NFR BLD AUTO: 0.7 %
INR PPP: 1.1
LYMPHOCYTES # BLD AUTO: 0.5 K/UL
LYMPHOCYTES NFR BLD: 6 %
MAGNESIUM SERPL-MCNC: 1.5 MG/DL
MCH RBC QN AUTO: 29.6 PG
MCHC RBC AUTO-ENTMCNC: 31.2 G/DL
MCV RBC AUTO: 95 FL
MONOCYTES # BLD AUTO: 1.2 K/UL
MONOCYTES NFR BLD: 13.5 %
NEUTROPHILS # BLD AUTO: 6.5 K/UL
NEUTROPHILS NFR BLD: 74.8 %
NRBC BLD-RTO: 0 /100 WBC
PHOSPHATE SERPL-MCNC: 2.7 MG/DL
PLATELET # BLD AUTO: 401 K/UL
PMV BLD AUTO: 10.7 FL
POCT GLUCOSE: 116 MG/DL (ref 70–110)
POCT GLUCOSE: 83 MG/DL (ref 70–110)
POCT GLUCOSE: 93 MG/DL (ref 70–110)
POCT GLUCOSE: 98 MG/DL (ref 70–110)
POTASSIUM SERPL-SCNC: 4.2 MMOL/L
PROT SERPL-MCNC: 5.3 G/DL
PROTHROMBIN TIME: 11.4 SEC
RBC # BLD AUTO: 2.84 M/UL
SODIUM SERPL-SCNC: 137 MMOL/L
TACROLIMUS BLD-MCNC: 5.4 NG/ML
WBC # BLD AUTO: 8.71 K/UL

## 2018-12-16 PROCEDURE — 25000003 PHARM REV CODE 250: Performed by: NURSE PRACTITIONER

## 2018-12-16 PROCEDURE — 25000242 PHARM REV CODE 250 ALT 637 W/ HCPCS: Performed by: NURSE PRACTITIONER

## 2018-12-16 PROCEDURE — 94640 AIRWAY INHALATION TREATMENT: CPT

## 2018-12-16 PROCEDURE — 80197 ASSAY OF TACROLIMUS: CPT

## 2018-12-16 PROCEDURE — 20600001 HC STEP DOWN PRIVATE ROOM

## 2018-12-16 PROCEDURE — 99233 PR SUBSEQUENT HOSPITAL CARE,LEVL III: ICD-10-PCS | Mod: 24,,, | Performed by: NURSE PRACTITIONER

## 2018-12-16 PROCEDURE — 63600175 PHARM REV CODE 636 W HCPCS: Performed by: STUDENT IN AN ORGANIZED HEALTH CARE EDUCATION/TRAINING PROGRAM

## 2018-12-16 PROCEDURE — 99233 SBSQ HOSP IP/OBS HIGH 50: CPT | Mod: 24,,, | Performed by: NURSE PRACTITIONER

## 2018-12-16 PROCEDURE — 85610 PROTHROMBIN TIME: CPT

## 2018-12-16 PROCEDURE — 80053 COMPREHEN METABOLIC PANEL: CPT

## 2018-12-16 PROCEDURE — 94664 DEMO&/EVAL PT USE INHALER: CPT

## 2018-12-16 PROCEDURE — 99900035 HC TECH TIME PER 15 MIN (STAT)

## 2018-12-16 PROCEDURE — 63600175 PHARM REV CODE 636 W HCPCS: Performed by: NURSE PRACTITIONER

## 2018-12-16 PROCEDURE — 25000003 PHARM REV CODE 250: Performed by: STUDENT IN AN ORGANIZED HEALTH CARE EDUCATION/TRAINING PROGRAM

## 2018-12-16 PROCEDURE — 25000003 PHARM REV CODE 250: Performed by: PHYSICIAN ASSISTANT

## 2018-12-16 PROCEDURE — 83735 ASSAY OF MAGNESIUM: CPT

## 2018-12-16 PROCEDURE — 85025 COMPLETE CBC W/AUTO DIFF WBC: CPT

## 2018-12-16 PROCEDURE — 84100 ASSAY OF PHOSPHORUS: CPT

## 2018-12-16 RX ORDER — TACROLIMUS 1 MG/1
3 CAPSULE ORAL 2 TIMES DAILY
Status: DISCONTINUED | OUTPATIENT
Start: 2018-12-16 | End: 2018-12-19

## 2018-12-16 RX ORDER — TACROLIMUS 1 MG/1
1 CAPSULE ORAL ONCE
Status: COMPLETED | OUTPATIENT
Start: 2018-12-16 | End: 2018-12-16

## 2018-12-16 RX ORDER — MAGNESIUM SULFATE HEPTAHYDRATE 40 MG/ML
2 INJECTION, SOLUTION INTRAVENOUS ONCE
Status: COMPLETED | OUTPATIENT
Start: 2018-12-16 | End: 2018-12-16

## 2018-12-16 RX ADMIN — BACITRACIN: 500 OINTMENT TOPICAL at 02:12

## 2018-12-16 RX ADMIN — LIDOCAINE 1 PATCH: 50 PATCH CUTANEOUS at 05:12

## 2018-12-16 RX ADMIN — RAMELTEON 8 MG: 8 TABLET, FILM COATED ORAL at 09:12

## 2018-12-16 RX ADMIN — PREDNISONE 5 MG: 5 TABLET ORAL at 08:12

## 2018-12-16 RX ADMIN — TRAZODONE HYDROCHLORIDE 25 MG: 50 TABLET ORAL at 09:12

## 2018-12-16 RX ADMIN — TACROLIMUS 2 MG: 1 CAPSULE ORAL at 08:12

## 2018-12-16 RX ADMIN — URSODIOL 300 MG: 300 CAPSULE ORAL at 08:12

## 2018-12-16 RX ADMIN — IPRATROPIUM BROMIDE AND ALBUTEROL SULFATE 3 ML: .5; 3 SOLUTION RESPIRATORY (INHALATION) at 12:12

## 2018-12-16 RX ADMIN — HEPARIN SODIUM 5000 UNITS: 5000 INJECTION, SOLUTION INTRAVENOUS; SUBCUTANEOUS at 02:12

## 2018-12-16 RX ADMIN — IPRATROPIUM BROMIDE AND ALBUTEROL SULFATE 3 ML: .5; 3 SOLUTION RESPIRATORY (INHALATION) at 04:12

## 2018-12-16 RX ADMIN — PANTOPRAZOLE SODIUM 40 MG: 40 TABLET, DELAYED RELEASE ORAL at 08:12

## 2018-12-16 RX ADMIN — TIZANIDINE 2 MG: 2 TABLET ORAL at 09:12

## 2018-12-16 RX ADMIN — TRAZODONE HYDROCHLORIDE 50 MG: 50 TABLET ORAL at 09:12

## 2018-12-16 RX ADMIN — TACROLIMUS 3 MG: 1 CAPSULE ORAL at 05:12

## 2018-12-16 RX ADMIN — ASPIRIN 81 MG CHEWABLE TABLET 81 MG: 81 TABLET CHEWABLE at 08:12

## 2018-12-16 RX ADMIN — IPRATROPIUM BROMIDE AND ALBUTEROL SULFATE 3 ML: .5; 3 SOLUTION RESPIRATORY (INHALATION) at 07:12

## 2018-12-16 RX ADMIN — URSODIOL 300 MG: 300 CAPSULE ORAL at 09:12

## 2018-12-16 RX ADMIN — BACITRACIN: 500 OINTMENT TOPICAL at 09:12

## 2018-12-16 RX ADMIN — HEPARIN SODIUM 5000 UNITS: 5000 INJECTION, SOLUTION INTRAVENOUS; SUBCUTANEOUS at 10:12

## 2018-12-16 RX ADMIN — MAGNESIUM SULFATE IN WATER 2 G: 40 INJECTION, SOLUTION INTRAVENOUS at 08:12

## 2018-12-16 RX ADMIN — BACITRACIN: 500 OINTMENT TOPICAL at 08:12

## 2018-12-16 RX ADMIN — IPRATROPIUM BROMIDE AND ALBUTEROL SULFATE 3 ML: .5; 3 SOLUTION RESPIRATORY (INHALATION) at 08:12

## 2018-12-16 RX ADMIN — ISAVUCONAZONIUM SULFATE 372 MG: 186 CAPSULE ORAL at 08:12

## 2018-12-16 RX ADMIN — ESCITALOPRAM OXALATE 10 MG: 10 TABLET ORAL at 09:12

## 2018-12-16 RX ADMIN — TRAMADOL HYDROCHLORIDE 50 MG: 50 TABLET, FILM COATED ORAL at 09:12

## 2018-12-16 RX ADMIN — TACROLIMUS 1 MG: 1 CAPSULE ORAL at 09:12

## 2018-12-16 NOTE — ASSESSMENT & PLAN NOTE
- 2 weeks for DEL prior to trnasplant  - Renal function slow to recover post-op.   - Nephrology consulted for management.   - Cont with HD as per nephrology recs.  Apprec recs.    - HD on 11/27 w/ 2L off. Pt tolerated well.    - Lasix 160 mg challenge 11/28 w/ minimal output.    - HD MWF. Tolerating well, last HD 12/14.  - Strict I&Os.   - pt reported 200 ml uop.

## 2018-12-16 NOTE — PLAN OF CARE
Problem: Patient Care Overview  Goal: Plan of Care Review  Patient is AAOx4, no output today. States that it is not unusual for the day after dialysis. Complains of nausea, no vomitting, gave PRN zofran. Offered to assist patient to chair or to ambulate several times today. He refuses. VS stable. Bed it low and locked, call light and personal belongings within reach.

## 2018-12-16 NOTE — PLAN OF CARE
"Problem: Patient Care Overview  Goal: Plan of Care Review  Patient AAOx4, VS stable, denies nausea, states pain is at acceptable comfort level, still has poor appetite. Patient had 1 BM and UO of 50 ml for the day. Patient transferred with 1 person assist to chair for meals and to wheelchair for "stroll" down the valladares. Required 2 person assist when wife was present to transfer to and from bedside commode.       "

## 2018-12-16 NOTE — PLAN OF CARE
Problem: Patient Care Overview  Goal: Plan of Care Review  Outcome: Ongoing (interventions implemented as appropriate)  Pt is AAOx3.CBG monitored AC HS.  SS coverage given when appropriate.Standard precautions maintained.  No breakdown noted, po fluids encouraged. Pt not taking part in self care. Pt refused to pull self medications. Pt refused activity. Pt educated on the need to move. Bed mobility encouraged. Pt remains injury and fall free, non skid footwear donned, call light within reach, personal items within reach, bed in low/locked position, pt able to voice needs all needs voiced have been met at this time.

## 2018-12-16 NOTE — SUBJECTIVE & OBJECTIVE
Scheduled Meds:   albuterol-ipratropium  3 mL Nebulization Q4H WAKE    aspirin  81 mg Oral Daily    bacitracin   Topical (Top) TID    epoetin sherlyn (PROCRIT) injection  50 Units/kg (Dosing Weight) Subcutaneous Every Mon, Wed, Fri    ergocalciferol  50,000 Units Oral Q7 Days    escitalopram oxalate  10 mg Oral Nightly    heparin (porcine)  5,000 Units Subcutaneous Q8H    isavuconazonium sulfate  372 mg Oral Daily    lidocaine  1 patch Transdermal Q24H    pantoprazole  40 mg Oral Daily    [START ON 12/17/2018] predniSONE  2.5 mg Oral Daily    sulfamethoxazole-trimethoprim 400-80mg  1 tablet Oral Every Mon, Wed, Fri    tacrolimus  3 mg Oral BID    traZODone  50 mg Oral QHS    ursodiol  300 mg Oral BID    valganciclovir 50 mg/ml  100 mg Oral Once per day on Mon Wed Fri     Continuous Infusions:  PRN Meds:acetaminophen, albuterol-ipratropium, artificial tears, bisacodyl, dextrose 50%, dextrose 50%, glucagon (human recombinant), glucose, glucose, heparin (porcine), insulin aspart U-100, midodrine, ondansetron, ramelteon, tiZANidine, traMADol, traZODone    Review of Systems   Constitutional: Positive for activity change, appetite change (poor) and fatigue. Negative for fever.   HENT: Negative for congestion, facial swelling and voice change.    Eyes: Negative for pain, discharge and visual disturbance.   Respiratory: Positive for shortness of breath (DIXON). Negative for apnea, cough, choking, chest tightness and wheezing.    Cardiovascular: Negative.  Negative for chest pain, palpitations and leg swelling.   Gastrointestinal: Positive for abdominal distention. Negative for abdominal pain, constipation, diarrhea, nausea and vomiting.   Endocrine: Negative.    Genitourinary: Positive for decreased urine volume. Negative for difficulty urinating, dysuria, hematuria and urgency.   Musculoskeletal: Negative.  Negative for back pain, gait problem, neck pain and neck stiffness.   Skin: Positive for wound. Negative  for color change and pallor.   Allergic/Immunologic: Positive for immunocompromised state.   Neurological: Positive for weakness. Negative for dizziness, seizures, light-headedness and headaches.   Psychiatric/Behavioral: Negative for behavioral problems, confusion, decreased concentration, dysphoric mood, hallucinations, sleep disturbance and suicidal ideas. The patient is not nervous/anxious.      Objective:     Vital Signs (Most Recent):  Temp: 98.4 °F (36.9 °C) (12/16/18 1110)  Pulse: 86 (12/16/18 0842)  Resp: 16 (12/16/18 0842)  BP: 124/87 (12/16/18 0725)  SpO2: 98 % (12/16/18 0842) Vital Signs (24h Range):  Temp:  [98 °F (36.7 °C)-98.8 °F (37.1 °C)] 98.4 °F (36.9 °C)  Pulse:  [86-95] 86  Resp:  [16-26] 16  SpO2:  [96 %-100 %] 98 %  BP: (124-141)/(87-93) 124/87     Weight: 70 kg (154 lb 5.2 oz)  Body mass index is 22.14 kg/m².    Intake/Output - Last 3 Shifts       12/14 0700 - 12/15 0659 12/15 0700 - 12/16 0659 12/16 0700 - 12/17 0659    P.O. 835 480     I.V. (mL/kg)   50 (0.7)    Other 400      Total Intake(mL/kg) 1235 (17.6) 480 (6.9) 50 (0.7)    Urine (mL/kg/hr) 0 (0) 0 (0) 30 (0.1)    Emesis/NG output 0 0     Other 2693      Stool 0      Blood 0      Total Output 2693 0 30    Net -1458 +480 +20           Stool Occurrence  0 x           Physical Exam   Constitutional: He is oriented to person, place, and time. He appears well-developed. No distress.   Temporal and distal extremity muscle wasting noted.   HENT:   Head: Normocephalic and atraumatic.   White drainage noted to back of throat and roof on mouth, voice hoarse   Eyes: Pupils are equal, round, and reactive to light. Scleral icterus is present.   Neck: Normal range of motion. Neck supple. No JVD present.   Cardiovascular: Normal rate, regular rhythm and normal heart sounds.   No murmur heard.  Pulmonary/Chest: Effort normal. No respiratory distress. He has decreased breath sounds in the right lower field and the left lower field. He has no wheezes.  He exhibits no tenderness.   Taking short breaths, IS up to 500   Abdominal: Soft. Bowel sounds are normal. He exhibits no distension. There is tenderness.   Chevron inc ECTOR w/ staples  RLQ JOSE A drain and percutaneous drain site w suture CDI.    Musculoskeletal: Normal range of motion. He exhibits no tenderness. Edema: trace ankle edema.   Neurological: He is alert and oriented to person, place, and time. He has normal reflexes.   Skin: Skin is warm and dry. Capillary refill takes 2 to 3 seconds. He is not diaphoretic. No pallor.   Psychiatric: He has a normal mood and affect. His behavior is normal. Judgment and thought content normal. His affect is not labile. He is not slowed. Cognition and memory are not impaired.   Nursing note and vitals reviewed.      Laboratory:  Immunosuppressants         Stop Route Frequency     tacrolimus capsule 3 mg      -- Oral 2 times daily        CBC:   Recent Labs   Lab 12/16/18  0554   WBC 8.71   RBC 2.84*   HGB 8.4*   HCT 26.9*   *   MCV 95   MCH 29.6   MCHC 31.2*     CMP:   Recent Labs   Lab 12/16/18  0554   GLU 87   CALCIUM 8.5*   ALBUMIN 2.0*   PROT 5.3*      K 4.2   CO2 25      BUN 19   CREATININE 2.8*   ALKPHOS 154*   ALT 12   AST 8*   BILITOT 1.5*     Labs within the past 24 hours have been reviewed.    Diagnostic Results:  I have personally reviewed all pertinent imaging studies.

## 2018-12-16 NOTE — PROGRESS NOTES
[facility]  Liver Transplant  Progress Note    Patient Name: Femi Enciso  MRN: [id number]  Admission Date: 10/27/2018  Hospital Length of Stay: 50 days  Code Status: Full Code  Primary Care Provider: Primary Doctor No  Post-Operative Day: 35    ORGAN:   LIVER  Disease Etiology: Alcoholic Cirrhosis  Donor Type:    - Brain Death  CDC High Risk:   No  Donor CMV Status:   Donor CMV Status: Positive  Donor HBcAB:   Negative  Donor HCV Status:   Negative  Donor HBV JAVIER: Negative  Donor HCV JAVIER: Negative  Whole or Partial: Whole Liver  Biliary Anastomosis: End to End  Arterial Anatomy: Standard  Subjective:     History of Present Illness:  Femi Enciso is a 53yr old male with PMH of acute ETOH hepatitis and DEL/ATN evolved to ESRD requiring HD (not candidate for KTX). He is now s/p liver transplant (SM induction, DBD, CMV D+/R-). Transplant surgery noted severe collateralization, adrenal gland firmly adhered to liver. Bare area of adrenal gland required several stitches and packing to obtain fair hemostasis (EBL 15L). OR take back  for bleeding from R adrenal bed in AM (significant clot in retroperitoneum, posterior to R hepatic lobe, tract posterior to the R kidney containing significant clot, small bleeding area of retroperitoneal fat) then returned to surgery same day for hemorrhaging closed with wound vac with plans to return to OR 24-48 hours for closure (retroperitoneal /retrorenal/retrocolic hematoma on the right ). Raw retroperitoneal bleeding. Suture ligatures placed, argon beam cautery, evarest placed in retroperitoneum behind cava and right kidney. Significant oozing from upper right adrenal gland, not amenable to ligation, that portion of the adrenal gland was resected with cautery). OR take back  for washout and incisional closure, no significant bleeding or hematoma found. OR cultures   from body fluid grew enterococcus faecium VRE. ID was consulted,  started on  daptomycin for VRE treatment and cefepime/flagyl to cover for IA ty in bile. Patient with significant leukocytosis with peak on 11/22 at 48K prompting drainage of R subphrenic fluid collection, perc drain placed, culture grew VRE. Stroke code called 11/21 for lethargy and unresponsive, CT head without evidence of acute ischemic changes and CTA chest negative, vascular neurology was consulted recommended MRI brain, but patient's AMS improved shortly after event. Nephrology consulted for ESRD requiring HD. Patient overall improved on antibiotic regimen, leukocytosis and AMS improved. He was transferred to TSU on POD #15.     Hospital Course:  Interval History:  No acute events overnight. LFTs stable. Pt reports making 200 ml uop. Last HD 12/14. Continue aggressive PT/OT. Plan for SNF placement early this week. Monitor.       Scheduled Meds:   albuterol-ipratropium  3 mL Nebulization Q4H WAKE    aspirin  81 mg Oral Daily    bacitracin   Topical (Top) TID    epoetin sherlyn (PROCRIT) injection  50 Units/kg (Dosing Weight) Subcutaneous Every Mon, Wed, Fri    ergocalciferol  50,000 Units Oral Q7 Days    escitalopram oxalate  10 mg Oral Nightly    heparin (porcine)  5,000 Units Subcutaneous Q8H    isavuconazonium sulfate  372 mg Oral Daily    lidocaine  1 patch Transdermal Q24H    pantoprazole  40 mg Oral Daily    [START ON 12/17/2018] predniSONE  2.5 mg Oral Daily    sulfamethoxazole-trimethoprim 400-80mg  1 tablet Oral Every Mon, Wed, Fri    tacrolimus  3 mg Oral BID    traZODone  50 mg Oral QHS    ursodiol  300 mg Oral BID    valganciclovir 50 mg/ml  100 mg Oral Once per day on Mon Wed Fri     Continuous Infusions:  PRN Meds:acetaminophen, albuterol-ipratropium, artificial tears, bisacodyl, dextrose 50%, dextrose 50%, glucagon (human recombinant), glucose, glucose, heparin (porcine), insulin aspart U-100, midodrine, ondansetron, ramelteon, tiZANidine, traMADol, traZODone    Review of Systems    Constitutional: Positive for activity change, appetite change (poor) and fatigue. Negative for fever.   HENT: Negative for congestion, facial swelling and voice change.    Eyes: Negative for pain, discharge and visual disturbance.   Respiratory: Positive for shortness of breath (DIXON). Negative for apnea, cough, choking, chest tightness and wheezing.    Cardiovascular: Negative.  Negative for chest pain, palpitations and leg swelling.   Gastrointestinal: Positive for abdominal distention. Negative for abdominal pain, constipation, diarrhea, nausea and vomiting.   Endocrine: Negative.    Genitourinary: Positive for decreased urine volume. Negative for difficulty urinating, dysuria, hematuria and urgency.   Musculoskeletal: Negative.  Negative for back pain, gait problem, neck pain and neck stiffness.   Skin: Positive for wound. Negative for color change and pallor.   Allergic/Immunologic: Positive for immunocompromised state.   Neurological: Positive for weakness. Negative for dizziness, seizures, light-headedness and headaches.   Psychiatric/Behavioral: Negative for behavioral problems, confusion, decreased concentration, dysphoric mood, hallucinations, sleep disturbance and suicidal ideas. The patient is not nervous/anxious.      Objective:     Vital Signs (Most Recent):  Temp: 98.4 °F (36.9 °C) (12/16/18 1110)  Pulse: 86 (12/16/18 0842)  Resp: 16 (12/16/18 0842)  BP: 124/87 (12/16/18 0725)  SpO2: 98 % (12/16/18 0842) Vital Signs (24h Range):  Temp:  [98 °F (36.7 °C)-98.8 °F (37.1 °C)] 98.4 °F (36.9 °C)  Pulse:  [86-95] 86  Resp:  [16-26] 16  SpO2:  [96 %-100 %] 98 %  BP: (124-141)/(87-93) 124/87     Weight: 70 kg (154 lb 5.2 oz)  Body mass index is 22.14 kg/m².    Intake/Output - Last 3 Shifts       12/14 0700 - 12/15 0659 12/15 0700 - 12/16 0659 12/16 0700 - 12/17 0659    P.O. 835 480     I.V. (mL/kg)   50 (0.7)    Other 400      Total Intake(mL/kg) 1235 (17.6) 480 (6.9) 50 (0.7)    Urine (mL/kg/hr) 0 (0) 0 (0)  30 (0.1)    Emesis/NG output 0 0     Other 2693      Stool 0      Blood 0      Total Output 2693 0 30    Net -1458 +480 +20           Stool Occurrence  0 x           Physical Exam   Constitutional: He is oriented to person, place, and time. He appears well-developed. No distress.   Temporal and distal extremity muscle wasting noted.   HENT:   Head: Normocephalic and atraumatic.   White drainage noted to back of throat and roof on mouth, voice hoarse   Eyes: Pupils are equal, round, and reactive to light. Scleral icterus is present.   Neck: Normal range of motion. Neck supple. No JVD present.   Cardiovascular: Normal rate, regular rhythm and normal heart sounds.   No murmur heard.  Pulmonary/Chest: Effort normal. No respiratory distress. He has decreased breath sounds in the right lower field and the left lower field. He has no wheezes. He exhibits no tenderness.   Taking short breaths, IS up to 500   Abdominal: Soft. Bowel sounds are normal. He exhibits no distension. There is tenderness.   Chevron inc ECTOR w/ staples  RLQ JOSE A drain and percutaneous drain site w suture CDI.    Musculoskeletal: Normal range of motion. He exhibits no tenderness. Edema: trace ankle edema.   Neurological: He is alert and oriented to person, place, and time. He has normal reflexes.   Skin: Skin is warm and dry. Capillary refill takes 2 to 3 seconds. He is not diaphoretic. No pallor.   Psychiatric: He has a normal mood and affect. His behavior is normal. Judgment and thought content normal. His affect is not labile. He is not slowed. Cognition and memory are not impaired.   Nursing note and vitals reviewed.      Laboratory:  Immunosuppressants         Stop Route Frequency     tacrolimus capsule 3 mg      -- Oral 2 times daily        CBC:   Recent Labs   Lab 12/16/18  0554   WBC 8.71   RBC 2.84*   HGB 8.4*   HCT 26.9*   *   MCV 95   MCH 29.6   MCHC 31.2*     CMP:   Recent Labs   Lab 12/16/18  0554   GLU 87   CALCIUM 8.5*   ALBUMIN  2.0*   PROT 5.3*      K 4.2   CO2 25      BUN 19   CREATININE 2.8*   ALKPHOS 154*   ALT 12   AST 8*   BILITOT 1.5*     Labs within the past 24 hours have been reviewed.    Diagnostic Results:  I have personally reviewed all pertinent imaging studies.    Assessment/Plan:     * S/P liver transplant    - 53 year old male with history of ESLD 2/2 ETOH cirrhosis who is s/p liver transplant c/b take-back x 3 for bleeding most recently 11/18.  - LFTs now improving slowly.  T bili was 37.6 day of transplant.  - Pt remains with significant debility. Pt will need SNF placement when medically stable.   - Appetite slowly improving.  Tolerating supplements.  Encourage PO intake.   - Drain placed during take back. Drain placed to intra-abdominal abscess on 11/22.   - Fluid from collection noted with enterococcus VRE. Cont with antibxs per ID.  De escalated to PO on 11/29/18.    - Abdominal pain well controlled, and having BMs.    - HD per nephrology. MWF. Tolerated well 12/14.  - Muscle relaxer started and will hold off on oxycodone for now.   - LFTs remain stable.        Hypomagnesemia    - Mag 2 gm IV x 1.        Nausea    - Intermittent, worse over past 10 days,  Changed Pepcid to Protonix 12/9/18.  Appetite seems to be slowly improving.    - Encourage multiple, small meals and cont PRN anti-emetics.  If no improvement, may need GI consult.    - GI symptoms now slowly improving.        Anxiety    - Restarted Lexapro as per psych recs, 12/13.        Moderate malnutrition    - muscle wasting noted.  Appetite on and off.  Cont supplements.  Dietician following.  Apprec recs.    - encourage multiple, small meals.     Acute renal failure with acute tubular necrosis superimposed on stage 3 chronic kidney disease    - 2 weeks for DEL prior to trnasplant  - Renal function slow to recover post-op.   - Nephrology consulted for management.   - Cont with HD as per nephrology recs.  Apprec recs.    - HD on 11/27 w/ 2L off. Pt  tolerated well.    - Lasix 160 mg challenge 11/28 w/ minimal output.    - HD MWF. Tolerating well, last HD 12/14.  - Strict I&Os.   - pt reported 200 ml uop.      Intra-abdominal abscess    - Significant increase in WBC post-op.   - OR cultures from 11/18 noted with enterococcus VRE.   - Abdominal CT performed at that time with fluid collection and drain placed on 11/22 for drainage.   - Intra-abdominal abscess culture also with enterococcus VRE.   - ID consulted and pt placed on antibxs for treatment. Pt was on Cefepime, Daptomycin, and Fluconazole for treatment.    - Pt remains with RLQ drains (one JOSE A and one Percutaneous).  One JOSE A removed 11/28.  Perc drain in placed draining tan, clear drainage.  WBC slowly improving.   - Liver US on 11/26 reviewed.   - Abdominal CT performed 11/27 and reviewed. Fluid collection noted with improvement on CT.  ID following and reassured by findings.    - Dapto, Cefepime and Flagyl changed 11/30/18 to Linezolid 600 mg PO q 12 hr x 10 days per ID.     - added back cefepime after thora.  ID re consulted.  - Completed Zyvox x 10 days per ID thru 12/9/18. Monitor.      Long-term use of immunosuppressant medication    - Cont with prograf. Draw prograf level daily and adjust dose as needed to maintain a therapeutic level.        At risk for opportunistic infections    - Cont with bactrim and valcyte for protection against opportunistic infections.   - cont Isavuconazonium.     Adrenal cortical steroids causing adverse effect in therapeutic use           Prophylactic immunotherapy    - See long term immunosuppression.        Weakness    - Pt remains significantly debilitated.   - PT/OT for strengthening.   - Currently will need SNF for placement and further rehabilitation.  Insurance does not cover Rehab.  - Pt remains severely debilitated and not strong enough for SNF at this time.   - Cont with strengthening and plan for SNF consult again next week.        Pleural effusion associated  with hepatic disorder    - c/o increased pain w deep breath today to right side.  CXR showed increased opacity in the inferior hemothorax on the right side since 11/26/2018.  Pulse ox 97-99% on RA.  Cont to monitor.   - continues to have fluid to RLL.  WBC remains elevated.    - thoracentesis performed on 11/30. Fluid noted with 4k WBC, 93 SEGS. cx no growth to date.  Cefepime added for treatment.  - Chest CT showing right pleural effusion improved, left w increased pleural effusion.   - Per ID, completed treatment of empyema w Cefepime thru 12/8.  - sats remain 97-99% on RA.     Type 2 diabetes mellitus without complication    - Endocrine consulted for management. Appreciate recs.   - hypoglycemia 12/10 requiring D50.    - repeat cx 12/11.       Anemia of chronic disease    - H&H stable. Monitor with daily labs.   - Chest/Abd/pelvis CT 12/4- no fluid to be drainged per transplant surgeon.  No source of bleeding.     - last liver US 11/27. Evolving peritransplant hematomas with anechoic fluid collection adjacent to the right hepatic lobe.  Small volume perihepatic free fluid.         VTE Risk Mitigation (From admission, onward)        Ordered     heparin (porcine) injection 1,000 Units  As needed (PRN)      12/12/18 1731     heparin (porcine) injection 5,000 Units  Every 8 hours      11/30/18 1149     IP VTE HIGH RISK PATIENT  Once      11/11/18 1208          The patients clinical status was discussed at multidisplinary rounds, involving transplant surgery, transplant medicine, pharmacy, nursing, nutrition, and social work    Discharge Planning: not a candidate for dc at this time.       Pamela Shirley NP  Liver Transplant  [facility]

## 2018-12-17 PROBLEM — R11.0 NAUSEA: Status: RESOLVED | Noted: 2018-12-11 | Resolved: 2018-12-17

## 2018-12-17 LAB
ALBUMIN SERPL BCP-MCNC: 2 G/DL
ALP SERPL-CCNC: 155 U/L
ALT SERPL W/O P-5'-P-CCNC: 11 U/L
ANION GAP SERPL CALC-SCNC: 10 MMOL/L
AST SERPL-CCNC: 9 U/L
BASOPHILS # BLD AUTO: 0.16 K/UL
BASOPHILS NFR BLD: 1.8 %
BILIRUB SERPL-MCNC: 1.5 MG/DL
BUN SERPL-MCNC: 24 MG/DL
CALCIUM SERPL-MCNC: 8.5 MG/DL
CHLORIDE SERPL-SCNC: 104 MMOL/L
CO2 SERPL-SCNC: 21 MMOL/L
CREAT SERPL-MCNC: 3.2 MG/DL
DIFFERENTIAL METHOD: ABNORMAL
EOSINOPHIL # BLD AUTO: 0.4 K/UL
EOSINOPHIL NFR BLD: 4.2 %
ERYTHROCYTE [DISTWIDTH] IN BLOOD BY AUTOMATED COUNT: 15.3 %
EST. GFR  (AFRICAN AMERICAN): 24.2 ML/MIN/1.73 M^2
EST. GFR  (NON AFRICAN AMERICAN): 21 ML/MIN/1.73 M^2
GLUCOSE SERPL-MCNC: 85 MG/DL
HCT VFR BLD AUTO: 28.3 %
HGB BLD-MCNC: 8.6 G/DL
IMM GRANULOCYTES # BLD AUTO: 0.08 K/UL
IMM GRANULOCYTES NFR BLD AUTO: 0.9 %
INR PPP: 1.1
LYMPHOCYTES # BLD AUTO: 0.5 K/UL
LYMPHOCYTES NFR BLD: 6.1 %
MAGNESIUM SERPL-MCNC: 1.9 MG/DL
MCH RBC QN AUTO: 29.3 PG
MCHC RBC AUTO-ENTMCNC: 30.4 G/DL
MCV RBC AUTO: 96 FL
MONOCYTES # BLD AUTO: 1.2 K/UL
MONOCYTES NFR BLD: 13.8 %
NEUTROPHILS # BLD AUTO: 6.4 K/UL
NEUTROPHILS NFR BLD: 73.2 %
NRBC BLD-RTO: 0 /100 WBC
PHOSPHATE SERPL-MCNC: 3.3 MG/DL
PLATELET # BLD AUTO: 423 K/UL
PMV BLD AUTO: 11 FL
POCT GLUCOSE: 149 MG/DL (ref 70–110)
POCT GLUCOSE: 84 MG/DL (ref 70–110)
POCT GLUCOSE: 84 MG/DL (ref 70–110)
POCT GLUCOSE: 85 MG/DL (ref 70–110)
POTASSIUM SERPL-SCNC: 4 MMOL/L
PROT SERPL-MCNC: 5.3 G/DL
PROTHROMBIN TIME: 11.4 SEC
RBC # BLD AUTO: 2.94 M/UL
SODIUM SERPL-SCNC: 135 MMOL/L
TACROLIMUS BLD-MCNC: 6 NG/ML
WBC # BLD AUTO: 8.71 K/UL

## 2018-12-17 PROCEDURE — 85610 PROTHROMBIN TIME: CPT

## 2018-12-17 PROCEDURE — 25000003 PHARM REV CODE 250: Performed by: NURSE PRACTITIONER

## 2018-12-17 PROCEDURE — 99233 SBSQ HOSP IP/OBS HIGH 50: CPT | Mod: 24,,, | Performed by: PHYSICIAN ASSISTANT

## 2018-12-17 PROCEDURE — 99232 PR SUBSEQUENT HOSPITAL CARE,LEVL II: ICD-10-PCS | Mod: ,,, | Performed by: PSYCHIATRY & NEUROLOGY

## 2018-12-17 PROCEDURE — 80053 COMPREHEN METABOLIC PANEL: CPT

## 2018-12-17 PROCEDURE — 94761 N-INVAS EAR/PLS OXIMETRY MLT: CPT

## 2018-12-17 PROCEDURE — 25000003 PHARM REV CODE 250: Performed by: STUDENT IN AN ORGANIZED HEALTH CARE EDUCATION/TRAINING PROGRAM

## 2018-12-17 PROCEDURE — 80197 ASSAY OF TACROLIMUS: CPT

## 2018-12-17 PROCEDURE — 25000003 PHARM REV CODE 250: Performed by: PHYSICIAN ASSISTANT

## 2018-12-17 PROCEDURE — 85025 COMPLETE CBC W/AUTO DIFF WBC: CPT

## 2018-12-17 PROCEDURE — 99233 PR SUBSEQUENT HOSPITAL CARE,LEVL III: ICD-10-PCS | Mod: 24,,, | Performed by: PHYSICIAN ASSISTANT

## 2018-12-17 PROCEDURE — 63600175 PHARM REV CODE 636 W HCPCS: Performed by: STUDENT IN AN ORGANIZED HEALTH CARE EDUCATION/TRAINING PROGRAM

## 2018-12-17 PROCEDURE — 99232 SBSQ HOSP IP/OBS MODERATE 35: CPT | Mod: ,,, | Performed by: PSYCHIATRY & NEUROLOGY

## 2018-12-17 PROCEDURE — 25000242 PHARM REV CODE 250 ALT 637 W/ HCPCS: Performed by: STUDENT IN AN ORGANIZED HEALTH CARE EDUCATION/TRAINING PROGRAM

## 2018-12-17 PROCEDURE — 63600175 PHARM REV CODE 636 W HCPCS: Performed by: NURSE PRACTITIONER

## 2018-12-17 PROCEDURE — 20600001 HC STEP DOWN PRIVATE ROOM

## 2018-12-17 PROCEDURE — 84100 ASSAY OF PHOSPHORUS: CPT

## 2018-12-17 PROCEDURE — 90935 PR HEMODIALYSIS, ONE EVALUATION: ICD-10-PCS | Mod: ,,, | Performed by: INTERNAL MEDICINE

## 2018-12-17 PROCEDURE — 90935 HEMODIALYSIS ONE EVALUATION: CPT

## 2018-12-17 PROCEDURE — 90935 HEMODIALYSIS ONE EVALUATION: CPT | Mod: ,,, | Performed by: INTERNAL MEDICINE

## 2018-12-17 PROCEDURE — 83735 ASSAY OF MAGNESIUM: CPT

## 2018-12-17 PROCEDURE — 94640 AIRWAY INHALATION TREATMENT: CPT

## 2018-12-17 PROCEDURE — 63600175 PHARM REV CODE 636 W HCPCS: Mod: JG | Performed by: INTERNAL MEDICINE

## 2018-12-17 RX ORDER — SODIUM CHLORIDE 9 MG/ML
INJECTION, SOLUTION INTRAVENOUS ONCE
Status: COMPLETED | OUTPATIENT
Start: 2018-12-17 | End: 2018-12-17

## 2018-12-17 RX ADMIN — TRAZODONE HYDROCHLORIDE 50 MG: 50 TABLET ORAL at 09:12

## 2018-12-17 RX ADMIN — HEPARIN SODIUM 5000 UNITS: 5000 INJECTION, SOLUTION INTRAVENOUS; SUBCUTANEOUS at 09:12

## 2018-12-17 RX ADMIN — URSODIOL 300 MG: 300 CAPSULE ORAL at 09:12

## 2018-12-17 RX ADMIN — SODIUM CHLORIDE 300 ML: 0.9 INJECTION, SOLUTION INTRAVENOUS at 11:12

## 2018-12-17 RX ADMIN — RAMELTEON 8 MG: 8 TABLET, FILM COATED ORAL at 09:12

## 2018-12-17 RX ADMIN — ASPIRIN 81 MG CHEWABLE TABLET 81 MG: 81 TABLET CHEWABLE at 12:12

## 2018-12-17 RX ADMIN — TIZANIDINE 2 MG: 2 TABLET ORAL at 09:12

## 2018-12-17 RX ADMIN — PREDNISONE 2.5 MG: 2.5 TABLET ORAL at 12:12

## 2018-12-17 RX ADMIN — HEPARIN SODIUM 5000 UNITS: 5000 INJECTION, SOLUTION INTRAVENOUS; SUBCUTANEOUS at 01:12

## 2018-12-17 RX ADMIN — TACROLIMUS 3 MG: 1 CAPSULE ORAL at 07:12

## 2018-12-17 RX ADMIN — IPRATROPIUM BROMIDE AND ALBUTEROL SULFATE 3 ML: .5; 3 SOLUTION RESPIRATORY (INHALATION) at 02:12

## 2018-12-17 RX ADMIN — PANTOPRAZOLE SODIUM 40 MG: 40 TABLET, DELAYED RELEASE ORAL at 12:12

## 2018-12-17 RX ADMIN — SULFAMETHOXAZOLE AND TRIMETHOPRIM 1 TABLET: 400; 80 TABLET ORAL at 09:12

## 2018-12-17 RX ADMIN — ESCITALOPRAM OXALATE 10 MG: 10 TABLET ORAL at 09:12

## 2018-12-17 RX ADMIN — ERYTHROPOIETIN 4200 UNITS: 10000 INJECTION, SOLUTION INTRAVENOUS; SUBCUTANEOUS at 11:12

## 2018-12-17 RX ADMIN — HEPARIN SODIUM 1000 UNITS: 1000 INJECTION, SOLUTION INTRAVENOUS; SUBCUTANEOUS at 11:12

## 2018-12-17 RX ADMIN — MORPHINE 100 MG: 10 SOLUTION ORAL at 09:12

## 2018-12-17 RX ADMIN — TACROLIMUS 3 MG: 1 CAPSULE ORAL at 06:12

## 2018-12-17 RX ADMIN — URSODIOL 300 MG: 300 CAPSULE ORAL at 12:12

## 2018-12-17 RX ADMIN — BACITRACIN: 500 OINTMENT TOPICAL at 06:12

## 2018-12-17 RX ADMIN — TRAMADOL HYDROCHLORIDE 50 MG: 50 TABLET, FILM COATED ORAL at 10:12

## 2018-12-17 RX ADMIN — TRAMADOL HYDROCHLORIDE 50 MG: 50 TABLET, FILM COATED ORAL at 04:12

## 2018-12-17 RX ADMIN — BACITRACIN: 500 OINTMENT TOPICAL at 09:12

## 2018-12-17 RX ADMIN — TRAZODONE HYDROCHLORIDE 25 MG: 50 TABLET ORAL at 09:12

## 2018-12-17 RX ADMIN — LIDOCAINE 1 PATCH: 50 PATCH CUTANEOUS at 06:12

## 2018-12-17 RX ADMIN — BACITRACIN: 500 OINTMENT TOPICAL at 12:12

## 2018-12-17 RX ADMIN — ISAVUCONAZONIUM SULFATE 372 MG: 186 CAPSULE ORAL at 12:12
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
## 2018-12-17 NOTE — PT/OT/SLP PROGRESS
Occupational Therapy      Patient Name:  Femi Enciso   MRN:  [id number]    Patient not seen today secondary to Dialysis, Patient unwilling to participate, Patient fatigue. Writing therapist attempted pt x3 today. 1st attempt pt at dialysis, 2nd attempt pt's wife requesting to give pt time2/2 fatigue, and 3rd attempt pt refused saying he was too tired to participate. Pt educated on ability and benefits of getting up  to the chair w/ nursing if up to it later on this date. Will follow-up as scheduled.    Tristan Spencer, LORENE  12/17/2018

## 2018-12-17 NOTE — ASSESSMENT & PLAN NOTE
- LFTs now improving slowly.  T bili was 37.6 day of transplant.  - Drain placed during take back. Drain placed to intra-abdominal abscess on 11/22.   - Fluid from collection noted with enterococcus VRE completed Zyvox treatment.  -Month 1 Liver US 12/17, results pending.

## 2018-12-17 NOTE — PT/OT/SLP PROGRESS
Physical Therapy      Patient Name:  Femi Enciso   MRN:  [id number]    Patient not seen today secondary to Dialysis, Patient unwilling to participate, Patient fatigue. Pt off floor for dialysis in AM. At 1st PM attempt, pt's wife reporting that pt was feeling SOB and requested that PT return later in PM. At 2nd PM attempt, pt declined therapy 2* fatigue following dialysis. Pt requesting dialysis be scheduled in PM in future so he is able to participate in therapy sessions. RN notified, who alerted dialysis. Pt encouraged to get out of bed to chair later this date with family and/or RN assistance. Pt v/u. Will follow-up at next scheduled session as able.    Ramandeep Kumar, PT, DPT   12/17/2018  [phone]

## 2018-12-17 NOTE — ASSESSMENT & PLAN NOTE
- Pt remains significantly debilitated.   - PT/OT for strengthening.   - Currently will need SNF for placement and further rehabilitation.  Insurance does not cover Rehab.  - Pt remains severely debilitated and not strong enough for SNF at this time.   - Cont with strengthening  -SNF consult placed 12/17.

## 2018-12-17 NOTE — PLAN OF CARE
Problem: Patient Care Overview  Goal: Plan of Care Review  Outcome: Ongoing (interventions implemented as appropriate)  Pt is AAOx3.CBG monitored AC HS.  SS coverage given when appropriate.NAD noted.Pt denies pian and/or discomfort at this time.No breakdown noted, po fluids encouraged.Standard precautions maintained.  Pt turns independently, pt is aware of bony area and pressure reduction positions Pt remains injury and fall free, non skid footwear donned, call light within reach, personal items within reach, bed in low/locked position, pt able to voice needs all needs voiced have been met at this time.
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
## 2018-12-17 NOTE — ASSESSMENT & PLAN NOTE
Dietician following. Severe temporal, clavicle muscle depletion noted. Severe subq fat loss  -Per dietician recs, will liberalize diet from low Na. Will have to do low potassium though for renal function  -Appreciate dietician assistance.

## 2018-12-17 NOTE — PROGRESS NOTES
"VITLAIY met with pts wife and pts mother at bedside, patient away from the room at dialysis.  Per LTS rounding team, they are consulting [facility] again today for placement, hopefully this week.    Pts wife asking SW about transportation resources, as it has been decided that pts mother Ambrosio Enciso or "Dago" will stay locally with patient when patient's wife Suzy needs to return to work in mid-January 2019.    Pts wife shared transportation resources she has been looking at so far, SW emailed pts wife with a list of other transportation resources including [employer], [facility] [employer] (pts mom would need  to complete), Senior Resource Guide for [facility], [facility] SW Resources packets, including other transportation information, which includes  C[employer] Ch[employer]rabort[employer].   SW discussed with pts wife if needing to print any applications she can go to medical library on first floor or contact the transplant social workers to assist.   Pts wife has looked at drive a [employer], [facility] [employer], paratransit for possible solutions for local transportation for pt and pts mother once she is gone in January.        Pts wife reports that she decided to sign the contract for [facility], [address] [place] [address], [address], [address].   Pts wife and pts mother now staying in the apartment.    At end of conversation with SW emailing pts wife resources this afternoon, pts mother stating " you all are keeping us prisoners here and we will go bankrupt in the process".   VITALIY explored with pt and pts mother their choice for coming to [facility] for transplant evaluation and the gift of life with new transplant liver that pt was given and our medical team needing to ensure patient safely discharged back to home in [place] when appropriate.        SW remains available for all transplant resources, education, psychosocial support and assist with all dc planning needs.   "

## 2018-12-17 NOTE — PLAN OF CARE
Problem: Patient Care Overview  Goal: Plan of Care Review  Outcome: Ongoing (interventions implemented as appropriate)  AM HD completed today. Wife assisted pt to W/C and rolled pt around unit. Pt c/o slight SOB and requested RT. RT given. Pt stated SOB resolved. Afebrile. Immunosuppression continues. Wife prepared self-meds correctly today. Skin remains intact. [employer] intact with steri-strips. Denies need for pain meds. Liver labs stable. [facility] consulted.

## 2018-12-17 NOTE — SUBJECTIVE & OBJECTIVE
Scheduled Meds:   sodium chloride 0.9%   Intravenous Once    aspirin  81 mg Oral Daily    bacitracin   Topical (Top) TID    epoetin sherlyn (PROCRIT) injection  50 Units/kg (Dosing Weight) Intravenous Every Mon, Wed, Fri    ergocalciferol  50,000 Units Oral Q7 Days    escitalopram oxalate  10 mg Oral Nightly    heparin (porcine)  5,000 Units Subcutaneous Q8H    isavuconazonium sulfate  372 mg Oral Daily    lidocaine  1 patch Transdermal Q24H    pantoprazole  40 mg Oral Daily    predniSONE  2.5 mg Oral Daily    sulfamethoxazole-trimethoprim 400-80mg  1 tablet Oral Every Mon, Wed, Fri    tacrolimus  3 mg Oral BID    traZODone  50 mg Oral QHS    ursodiol  300 mg Oral BID    valganciclovir 50 mg/ml  100 mg Oral Once per day on Mon Wed Fri     Continuous Infusions:  PRN Meds:acetaminophen, albuterol-ipratropium, artificial tears, bisacodyl, dextrose 50%, dextrose 50%, glucagon (human recombinant), glucose, glucose, heparin (porcine), insulin aspart U-100, midodrine, ondansetron, ramelteon, tiZANidine, traMADol, traZODone    Review of Systems   Constitutional: Positive for activity change and appetite change (improving). Negative for fatigue and fever.   HENT: Negative for congestion, facial swelling and voice change.    Eyes: Negative for pain, discharge and visual disturbance.   Respiratory: Negative for apnea, cough, choking, chest tightness, shortness of breath and wheezing.    Cardiovascular: Negative.  Negative for chest pain, palpitations and leg swelling.   Gastrointestinal: Negative for abdominal distention, abdominal pain, constipation, diarrhea, nausea and vomiting.   Endocrine: Negative.    Genitourinary: Positive for decreased urine volume. Negative for difficulty urinating, dysuria, hematuria and urgency.   Musculoskeletal: Negative.  Negative for back pain, gait problem, neck pain and neck stiffness.   Skin: Positive for wound. Negative for color change and pallor.   Allergic/Immunologic:  Positive for immunocompromised state.   Neurological: Positive for weakness. Negative for dizziness, seizures, light-headedness and headaches.   Psychiatric/Behavioral: Negative for behavioral problems, confusion, decreased concentration, dysphoric mood, hallucinations, sleep disturbance and suicidal ideas. The patient is not nervous/anxious.      Objective:     Vital Signs (Most Recent):  Temp: 97.8 °F (36.6 °C) (12/17/18 1240)  Pulse: 95 (12/17/18 1240)  Resp: (!) 24 (12/17/18 1240)  BP: 113/77 (12/17/18 1240)  SpO2: 100 % (12/17/18 1240) Vital Signs (24h Range):  Temp:  [97.6 °F (36.4 °C)-98.2 °F (36.8 °C)] 97.8 °F (36.6 °C)  Pulse:  [86-99] 95  Resp:  [18-36] 24  SpO2:  [97 %-100 %] 100 %  BP: (106-147)/() 113/77     Weight: 70 kg (154 lb 5.2 oz)  Body mass index is 22.14 kg/m².    Intake/Output - Last 3 Shifts       12/15 0700 - 12/16 0659 12/16 0700 - 12/17 0659 12/17 0700 - 12/18 0659    P.O. 480 500     I.V. (mL/kg)  50 (0.7)     Other       Total Intake(mL/kg) 480 (6.9) 550 (7.9)     Urine (mL/kg/hr) 0 (0) 50 (0)     Emesis/NG output 0      Other       Stool  0     Blood       Total Output 0 50     Net +480 +500            Stool Occurrence 0 x 1 x           Physical Exam   Constitutional: He is oriented to person, place, and time. He appears well-developed. No distress.   Temporal and distal extremity muscle wasting noted.   HENT:   Head: Normocephalic and atraumatic.   Eyes: Pupils are equal, round, and reactive to light.   Neck: Normal range of motion. Neck supple. No JVD present.   Cardiovascular: Normal rate, regular rhythm and normal heart sounds.   No murmur heard.  Pulmonary/Chest: Effort normal. No respiratory distress. He has decreased breath sounds in the right lower field and the left lower field. He has no wheezes. He exhibits no tenderness.   Abdominal: Soft. Bowel sounds are normal. He exhibits no distension. There is no tenderness.   [employer] clean, dry, intact with steri strips in  place     Musculoskeletal: Normal range of motion. He exhibits no tenderness. Edema: trace ankle edema.   Neurological: He is alert and oriented to person, place, and time. He has normal reflexes.   Skin: Skin is warm and dry. Capillary refill takes 2 to 3 seconds. He is not diaphoretic. No pallor.   Psychiatric: He has a normal mood and affect. His behavior is normal. Judgment and thought content normal. His affect is not labile. He is not slowed. Cognition and memory are not impaired.   Nursing note and vitals reviewed.      Laboratory:  Immunosuppressants         Stop Route Frequency     tacrolimus capsule 3 mg      -- Oral 2 times daily        CBC:   Recent Labs   Lab 12/17/18  0648   WBC 8.71   RBC 2.94*   HGB 8.6*   HCT 28.3*   *   MCV 96   MCH 29.3   MCHC 30.4*     CMP:   Recent Labs   Lab 12/17/18  0648   GLU 85   CALCIUM 8.5*   ALBUMIN 2.0*   PROT 5.3*   *   K 4.0   CO2 21*      BUN 24*   CREATININE 3.2*   ALKPHOS 155*   ALT 11   AST 9*   BILITOT 1.5*     Labs within the past 24 hours have been reviewed.    Diagnostic Results:  I have personally reviewed all pertinent imaging studies.

## 2018-12-17 NOTE — PROGRESS NOTES
Arrived to dialysis via stretcher. AAO x 4. NAD noted. Agreed to acute 3 hour dialysis [place]. Good flow to Right chest CVC.

## 2018-12-17 NOTE — PROGRESS NOTES
"[facility]  Adult Nutrition  Progress Note    SUMMARY       Recommendations    Recommendation/Intervention: 1. Continue low potassium diet as needed, then ADAT to regular diet.  Goals: 1. Pt to receive % EEN and EPN during stay.  Nutrition Goal Status: goal not met  Communication of RD Recs: reviewed with RN(reviewed with PA)    Reason for Assessment    Reason For Assessment: RD follow-up  Diagnosis: transplant/postoperative complications(s/p OLTx 11/11)  Relevant Medical History: ESLD 2/2 alcoholic cirrhosis, ESRD on HD, DM2  Interdisciplinary Rounds: attended  General Information Comments: Pt s/p OLTx 11/11 with multiple complications over the last month including intra-abdominal abscess. Pt with ESRD continues on HD. Pt noted with severe malnutrition per NFPE 11/14 with very poor intake over last week and 56 lb wt loss since Sept. 2018. Diet just changed to low potassium, which allows the pt more choice. Educated pt and his wife on high potassium foods as she is getting outside food for pt. Completed full NFPE: pt continues with severe temporal, scapular, calf and quadricep wasting with protruding acromion process, ribs and patellas as well as overall moderate fat loss and triceps, buccal/orbital wasting. Pt is very weak.    Nutrition Discharge Planning: Pt to have adequate po intake to improve nutritional status.    Nutrition/Diet History    Patient Reported Diet/Restrictions/Preferences: low salt, general  Food Preferences: noted  Spiritual, Cultural Beliefs, Zoroastrian Practices, Values that Affect Care: no  Factors Affecting Nutritional Intake: decreased appetite    Anthropometrics    Temp: 97.8 °F (36.6 °C)  Height Method: Stated  Height: 5' 10" (177.8 cm)  Height (inches): 70 in  Weight Method: Bed Scale  Weight: 70 kg (154 lb 5.2 oz)  Weight (lb): 154.32 lb  Ideal Body Weight (IBW), Male: 166 lb  % Ideal Body Weight, Male (lb): 119.13 lb  BMI (Calculated): 28.4  BMI Grade: 25 - " 29.9 - overweight  Usual Body Weight (UBW), k.5 kg  % Usual Body Weight: 86.67  % Weight Change From Usual Weight: -13.51 %       Lab/Procedures/Meds    Pertinent Labs Reviewed: reviewed  Pertinent Labs Comments: H/H 8.6/28.3, Na 135, BUN 24, Creat 3.2, eGFR 21, Alk phos 155, Alb 2, T. Bili 1.5  Pertinent Medications Reviewed: reviewed  Pertinent Medications Comments: vitamin D, pantoprazole, prednisone, prograf    Physical Findings/Assessment    (RETIRED) Overall Physical Appearance: loss of muscle mass, loss of subcutaneous fat  (RETIRED) Tubes: (-)  RETIRED Oral/Mouth Cavity: tooth/teeth missing  (RETIRED) Skin: edema, incision(s), skin tear(Skin Tear Lower leg)    Estimated/Assessed Needs    Weight Used For Calorie Calculations: 70 kg (154 lb 5.2 oz)  Energy Calorie Requirements (kcal):   Energy Need Method: [place]-St Jeor(PAL 1.30)  Protein Requirements: 91-105g  Weight Used For Protein Calculations: 70 kg (154 lb 5.2 oz)  Fluid Requirements (mL): 1 mL/kcal or per MD  Estimated Fluid Requirement Method: RDA Method  RDA Method (mL):      Nutrition Prescription Ordered    Current Diet Order: Low Na diet  Nutrition Order Comments: -  Current Nutrition Support Formula Ordered: (-)  Current Nutrition Support Rate Ordered: 0 (ml)  Current Nutrition Support Frequency Ordered: -  Oral Nutrition Supplement: Boost Plus TID, Boost Breeze TID, Novasource Renal TID    Evaluation of Received Nutrient/Fluid Intake    Parenteral Calories (kcal): 0  Parenteral Protein (gm): 0  Parenteral Fluid (mL): 0  GIR (Glucose Infusion Rate) (mg/kg/min): 0 mg/kg/min  Other Calories (kcal): 0  % Kcal Needs: 0  % Protein Needs: 0  I/O: -2.3L since admission; +500ml x 24 hrs  Energy Calories Required: not meeting needs  Protein Required: not meeting needs  Fluid Required: (-)  Comments: LBM   Tolerance: (-)  % Intake of Estimated Energy Needs: 0 - 25 %  % Meal Intake: 0 - 25 %    Nutrition Risk    Level of Risk/Frequency of  Follow-up: high     Assessment and Plan  Severe Protein-Calorie Malnutrition  Malnutrition in the context of Chronic Illness/Injury     Related to (etiology):  Poor oral intake in the setting of ESLD and complications s/p liver transplant 11/11     Signs and Symptoms (as evidenced by):  Energy Intake: <50%of estimated energy requirement for 3 months; <25% intake x past week  Body Fat Depletion: severe overall subcutaneous fat loss and depletion of orbital area, triceps as well as protruding patellas, ribs and acromion process  Muscle Mass Depletion: severe scapular, temporal, calf, quadricep muscle wasting   Weight Loss: 27% x 3 months     Monitor and Evaluation    Food and Nutrient Intake: energy intake, food and beverage intake  Food and Nutrient Adminstration: diet order  Knowledge/Beliefs/Attitudes: food and nutrition knowledge/skill  Physical Activity and Function: factors affecting access to physical activity  Anthropometric Measurements: weight, weight change, body mass index  Biochemical Data, Medical Tests and Procedures: electrolyte and renal panel, gastrointestinal profile, glucose/endocrine profile, inflammatory profile, lipid profile  Nutrition-Focused Physical Findings: overall appearance, extremities, muscles and bones, head and eyes, skin     Malnutrition Assessment  Malnutrition Type: chronic illness  Energy Intake: severe energy intake  Musculoskeletal/Lower Extremities: subcutaneous fat loss, muscle wasting       Weight Loss (Malnutrition): (27% x 3 months)  Energy Intake (Malnutrition): less than or equal to 50% for greater than or equal to 1 month  Subcutaneous Fat (Malnutrition): severe depletion  Muscle Mass (Malnutrition): severe depletion   Orbital Region (Subcutaneous Fat Loss): moderate depletion  Upper Arm Region (Subcutaneous Fat Loss): severe depletion   Jainism Region (Muscle Loss): severe depletion  Scapular Bone Region (Muscle Loss): severe depletion  Patellar Region (Muscle Loss):  severe depletion  Anterior Thigh Region (Muscle Loss): severe depletion  Posterior Calf Region (Muscle Loss): severe depletion       Subcutaneous Fat Loss (Final Summary): severe protein-calorie malnutrition  Muscle Loss Evaluation (Final Summary): severe protein-calorie malnutrition    Severe Weight Loss (Malnutrition): (27% x 3 months)    Nutrition Follow-Up    RD Follow-up?: Yes

## 2018-12-17 NOTE — SUBJECTIVE & OBJECTIVE
"Interval History:   Reports he slept better on regimen of tramadol, ramelteon, trazodone, tizanidine last night.  Back pain and anxiety both improved.  Wife reports he was "snarky" yesterday, is eager for pt to participate in counseling.    Reports improved tolerance of PO intake.      Family History     None        Tobacco Use    Smoking status: Never Smoker   Substance and Sexual Activity    Alcohol use: Not on file    Drug use: Not on file    Sexual activity: Not on file     Psychotherapeutics (From admission, onward)    Start     Stop Route Frequency Ordered    12/13/18 2100  escitalopram oxalate tablet 10 mg      -- Oral Nightly 12/13/18 1418    12/10/18 2100  traZODone tablet 50 mg      -- Oral Nightly 12/10/18 1613    12/10/18 1713  ramelteon tablet 8 mg      -- Oral Nightly PRN 12/10/18 1614    11/30/18 1233  trazodone split tablet 25 mg      -- Oral Nightly PRN 11/30/18 1150    11/14/18 1118  haloperidol lactate (HALDOL) 5 mg/mL injection     Comments:  Created by cabinet override    11/14 2329   11/14/18 1118           Review of Systems   Constitutional: Negative for fever.   Gastrointestinal: Positive for nausea.   Musculoskeletal: Positive for myalgias.   Neurological: Negative for weakness.   Psychiatric/Behavioral: Negative for confusion, decreased concentration and hallucinations. The patient is nervous/anxious.      Objective:     Vital Signs (Most Recent):  Temp: 98.1 °F (36.7 °C) (12/17/18 0801)  Pulse: 88 (12/17/18 0915)  Resp: (!) 22 (12/17/18 0915)  BP: 119/85 (12/17/18 0915)  SpO2: 99 % (12/17/18 0725) Vital Signs (24h Range):  Temp:  [97.6 °F (36.4 °C)-98.4 °F (36.9 °C)] 98.1 °F (36.7 °C)  Pulse:  [86-99] 88  Resp:  [18-36] 22  SpO2:  [97 %-99 %] 99 %  BP: (118-147)/() 119/85     Height: 5' 10" (177.8 cm)  Weight: 70 kg (154 lb 5.2 oz)  Body mass index is 22.14 kg/m².      Intake/Output Summary (Last 24 hours) at 12/17/2018 0957  Last data filed at 12/16/2018 1800  Gross per 24 hour " "  Intake 400 ml   Output 20 ml   Net 380 ml       Physical Exam   Constitutional: He appears well-developed. No distress.   HENT:   Head: Normocephalic.   Cardiovascular: Normal rate and regular rhythm.   Pulmonary/Chest: Effort normal.   Abdominal: Soft.   Musculoskeletal: He exhibits no edema.           Mental Status Exam:  Appearance: age appropriate, lying in bed  Grooming: appropriate to situation  Arousal: awake.  Behavior/Cooperation: cooperative  Speech: Appropriate  Language: appropriate english vocabulary  Affect: normal  Thought Process: normal  Thought Content: appropriate  Associations: no loose associations noted  Orientation: month/year/situation, not date.  Memory: 3/3 registration.  0/3 recall at 5 minutes.  Mood: "good"  Attention: 1/5 successful subtractions on serial 7s.  Able to spell "world" backwards.  Fund of Knowledge: Decreased  Insight: fair  Judgment: fair     Significant Labs: All pertinent labs within the past 24 hours have been reviewed.      Microbiology Results (last 7 days)     Procedure Component Value Units Date/Time    Blood culture [[id number]] Collected:  12/11/18 1211    Order Status:  Completed Specimen:  Blood Updated:  12/16/18 1412     Blood Culture, Routine No growth after 5 days.    Narrative:       Draw 15 min apart    Blood culture [[id number]] Collected:  12/11/18 1211    Order Status:  Completed Specimen:  Blood Updated:  12/16/18 1412     Blood Culture, Routine No growth after 5 days.    Urine culture [[id number]] Collected:  12/11/18 2122    Order Status:  Completed Specimen:  Urine, Clean Catch Updated:  12/13/18 0101     Urine Culture, Routine No growth    Fungus culture [[id number]] Collected:  11/11/18 0415    Order Status:  Completed Specimen:  Pleural Fluid from Ascites Updated:  12/11/18 1448     Fungus (Mycology) Culture No fungus isolated after 4 weeks           Significant Imaging: I have reviewed all pertinent imaging results/findings within the past 24 " hours.

## 2018-12-17 NOTE — PROGRESS NOTES
[facility]  Liver Transplant  Progress Note    Patient Name: Femi Enciso  MRN: [id number]  Admission Date: 10/27/2018  Hospital Length of Stay: 51 days  Code Status: Full Code  Primary Care Provider: Primary Doctor No  Post-Operative Day: 36    ORGAN:   LIVER  Disease Etiology: Alcoholic Cirrhosis  Donor Type:    - Brain Death  CDC High Risk:   No  Donor CMV Status:   Donor CMV Status: Positive  Donor HBcAB:   Negative  Donor HCV Status:   Negative  Donor HBV JAVIER: Negative  Donor HCV JAVIER: Negative  Whole or Partial: Whole Liver  Biliary Anastomosis: End to End  Arterial Anatomy: Standard  Subjective:     History of Present Illness:  Femi Enciso is a 53yr old male with PMH of acute ETOH hepatitis and DEL/ATN evolved to ESRD requiring HD (not candidate for KTX). He is now s/p liver transplant (SM induction, DBD, CMV D+/R-). Transplant surgery noted severe collateralization, adrenal gland firmly adhered to liver. Bare area of adrenal gland required several stitches and packing to obtain fair hemostasis (EBL 15L). OR take back  for bleeding from R adrenal bed in AM (significant clot in retroperitoneum, posterior to R hepatic lobe, tract posterior to the R kidney containing significant clot, small bleeding area of retroperitoneal fat) then returned to surgery same day for hemorrhaging closed with wound vac with plans to return to OR 24-48 hours for closure (retroperitoneal /retrorenal/retrocolic hematoma on the right ). Raw retroperitoneal bleeding. Suture ligatures placed, argon beam cautery, evarest placed in retroperitoneum behind cava and right kidney. Significant oozing from upper right adrenal gland, not amenable to ligation, that portion of the adrenal gland was resected with cautery). OR take back  for washout and incisional closure, no significant bleeding or hematoma found. OR cultures   from body fluid grew enterococcus faecium VRE. ID was consulted,  started on  daptomycin for VRE treatment and cefepime/flagyl to cover for IA ty in bile. Patient with significant leukocytosis with peak on 11/22 at 48K prompting drainage of R subphrenic fluid collection, perc drain placed, culture grew VRE. Stroke code called 11/21 for lethargy and unresponsive, CT head without evidence of acute ischemic changes and CTA chest negative, vascular neurology was consulted recommended MRI brain, but patient's AMS improved shortly after event. Nephrology consulted for ESRD requiring HD. Patient overall improved on antibiotic regimen, leukocytosis and AMS improved. He was transferred to TSU on POD #15.     Hospital Course:  Interval History:  No acute events overnight. Patient feeling well without complaint this morning. Receiving HD today. LFTs stable. Plan for routine month 1 Liver US today. SNF consult placed.  Continue aggressive PT/OT.       Scheduled Meds:   sodium chloride 0.9%   Intravenous Once    aspirin  81 mg Oral Daily    bacitracin   Topical (Top) TID    epoetin sherlyn (PROCRIT) injection  50 Units/kg (Dosing Weight) Intravenous Every Mon, Wed, Fri    ergocalciferol  50,000 Units Oral Q7 Days    escitalopram oxalate  10 mg Oral Nightly    heparin (porcine)  5,000 Units Subcutaneous Q8H    isavuconazonium sulfate  372 mg Oral Daily    lidocaine  1 patch Transdermal Q24H    pantoprazole  40 mg Oral Daily    predniSONE  2.5 mg Oral Daily    sulfamethoxazole-trimethoprim 400-80mg  1 tablet Oral Every Mon, Wed, Fri    tacrolimus  3 mg Oral BID    traZODone  50 mg Oral QHS    ursodiol  300 mg Oral BID    valganciclovir 50 mg/ml  100 mg Oral Once per day on Mon Wed Fri     Continuous Infusions:  PRN Meds:acetaminophen, albuterol-ipratropium, artificial tears, bisacodyl, dextrose 50%, dextrose 50%, glucagon (human recombinant), glucose, glucose, heparin (porcine), insulin aspart U-100, midodrine, ondansetron, ramelteon, tiZANidine, traMADol, traZODone    Review of Systems    Constitutional: Positive for activity change and appetite change (improving). Negative for fatigue and fever.   HENT: Negative for congestion, facial swelling and voice change.    Eyes: Negative for pain, discharge and visual disturbance.   Respiratory: Negative for apnea, cough, choking, chest tightness, shortness of breath and wheezing.    Cardiovascular: Negative.  Negative for chest pain, palpitations and leg swelling.   Gastrointestinal: Negative for abdominal distention, abdominal pain, constipation, diarrhea, nausea and vomiting.   Endocrine: Negative.    Genitourinary: Positive for decreased urine volume. Negative for difficulty urinating, dysuria, hematuria and urgency.   Musculoskeletal: Negative.  Negative for back pain, gait problem, neck pain and neck stiffness.   Skin: Positive for wound. Negative for color change and pallor.   Allergic/Immunologic: Positive for immunocompromised state.   Neurological: Positive for weakness. Negative for dizziness, seizures, light-headedness and headaches.   Psychiatric/Behavioral: Negative for behavioral problems, confusion, decreased concentration, dysphoric mood, hallucinations, sleep disturbance and suicidal ideas. The patient is not nervous/anxious.      Objective:     Vital Signs (Most Recent):  Temp: 97.8 °F (36.6 °C) (12/17/18 1240)  Pulse: 95 (12/17/18 1240)  Resp: (!) 24 (12/17/18 1240)  BP: 113/77 (12/17/18 1240)  SpO2: 100 % (12/17/18 1240) Vital Signs (24h Range):  Temp:  [97.6 °F (36.4 °C)-98.2 °F (36.8 °C)] 97.8 °F (36.6 °C)  Pulse:  [86-99] 95  Resp:  [18-36] 24  SpO2:  [97 %-100 %] 100 %  BP: (106-147)/() 113/77     Weight: 70 kg (154 lb 5.2 oz)  Body mass index is 22.14 kg/m².    Intake/Output - Last 3 Shifts       12/15 0700 - 12/16 0659 12/16 0700 - 12/17 0659 12/17 0700 - 12/18 0659    P.O. 480 500     I.V. (mL/kg)  50 (0.7)     Other       Total Intake(mL/kg) 480 (6.9) 550 (7.9)     Urine (mL/kg/hr) 0 (0) 50 (0)     Emesis/NG output 0       Other       Stool  0     Blood       Total Output 0 50     Net +480 +500            Stool Occurrence 0 x 1 x           Physical Exam   Constitutional: He is oriented to person, place, and time. He appears well-developed. No distress.   Temporal and distal extremity muscle wasting noted.   HENT:   Head: Normocephalic and atraumatic.   Eyes: Pupils are equal, round, and reactive to light.   Neck: Normal range of motion. Neck supple. No JVD present.   Cardiovascular: Normal rate, regular rhythm and normal heart sounds.   No murmur heard.  Pulmonary/Chest: Effort normal. No respiratory distress. He has decreased breath sounds in the right lower field and the left lower field. He has no wheezes. He exhibits no tenderness.   Abdominal: Soft. Bowel sounds are normal. He exhibits no distension. There is no tenderness.   Chevron inc clean, dry, intact with steri strips in place     Musculoskeletal: Normal range of motion. He exhibits no tenderness. Edema: trace ankle edema.   Neurological: He is alert and oriented to person, place, and time. He has normal reflexes.   Skin: Skin is warm and dry. Capillary refill takes 2 to 3 seconds. He is not diaphoretic. No pallor.   Psychiatric: He has a normal mood and affect. His behavior is normal. Judgment and thought content normal. His affect is not labile. He is not slowed. Cognition and memory are not impaired.   Nursing note and vitals reviewed.      Laboratory:  Immunosuppressants         Stop Route Frequency     tacrolimus capsule 3 mg      -- Oral 2 times daily        CBC:   Recent Labs   Lab 12/17/18  0648   WBC 8.71   RBC 2.94*   HGB 8.6*   HCT 28.3*   *   MCV 96   MCH 29.3   MCHC 30.4*     CMP:   Recent Labs   Lab 12/17/18  0648   GLU 85   CALCIUM 8.5*   ALBUMIN 2.0*   PROT 5.3*   *   K 4.0   CO2 21*      BUN 24*   CREATININE 3.2*   ALKPHOS 155*   ALT 11   AST 9*   BILITOT 1.5*     Labs within the past 24 hours have been reviewed.    Diagnostic Results:  I  have personally reviewed all pertinent imaging studies.    Assessment/Plan:     * S/P liver transplant    - LFTs now improving slowly.  T bili was 37.6 day of transplant.  - Drain placed during take back. Drain placed to intra-abdominal abscess on 11/22.   - Fluid from collection noted with enterococcus VRE completed Zyvox treatment.  -Month 1 Liver US 12/17, results pending.        Hypomagnesemia    - Improved. Continue to monitor.      Severe malnutrition    Dietician following. Severe temporal, clavicle muscle depletion noted. Severe subq fat loss  -Per dietician recs, will liberalize diet from low Na. Will have to do low potassium though for renal function  -Appreciate dietician assistance.       Anxiety    - Restarted Lexapro as per psych recs, 12/13.        Acute renal failure with acute tubular necrosis superimposed on stage 3 chronic kidney disease    - 2 weeks for DEL prior to trnasplant  - Renal function slow to recover post-op.   - Nephrology consulted for management.   - Cont with HD as per nephrology recs.  Apprec recs.    - HD on 11/27 w/ 2L off. Pt tolerated well.    - Lasix 160 mg challenge 11/28 w/ minimal output.    - HD MWF. Tolerating well, last HD 12/14.  - Strict I&Os.      Intra-abdominal abscess    - Significant increase in WBC post-op.   - OR cultures from 11/18 noted with enterococcus VRE.   - Abdominal CT performed at that time with fluid collection and drain placed on 11/22 for drainage.   - Intra-abdominal abscess culture also with enterococcus VRE.   - ID consulted and pt placed on antibxs for treatment. Pt was on Cefepime, Daptomycin, and Fluconazole for treatment.    - Pt remains with RLQ drains (one JOSE A and one Percutaneous).  One JOSE A removed 11/28.  Perc drain in placed draining tan, clear drainage.  WBC slowly improving.   - Liver US on 11/26 reviewed.   - Abdominal CT performed 11/27 and reviewed. Fluid collection noted with improvement on CT.  ID following and reassured by  findings.    - Dapto, Cefepime and Flagyl changed 11/30/18 to Linezolid 600 mg PO q 12 hr x 10 days per ID.     - added back cefepime after thora.  ID re consulted.  - Completed Zyvox x 10 days per ID thru 12/9/18. Monitor.      Long-term use of immunosuppressant medication    - Cont with prograf. Draw prograf level daily and adjust dose as needed to maintain a therapeutic level.        At risk for opportunistic infections    - Cont with bactrim and valcyte for protection against opportunistic infections.   - cont Isavuconazonium.     Adrenal cortical steroids causing adverse effect in therapeutic use           Prophylactic immunotherapy    - See long term immunosuppression.        Weakness    - Pt remains significantly debilitated.   - PT/OT for strengthening.   - Currently will need SNF for placement and further rehabilitation.  Insurance does not cover Rehab.  - Pt remains severely debilitated and not strong enough for SNF at this time.   - Cont with strengthening  -SNF consult placed 12/17.        Pleural effusion associated with hepatic disorder    - c/o increased pain w deep breath today to right side.  CXR showed increased opacity in the inferior hemothorax on the right side since 11/26/2018.  Pulse ox 97-99% on RA.  Cont to monitor.   - continues to have fluid to RLL.  WBC remains elevated.    - thoracentesis performed on 11/30. Fluid noted with 4k WBC, 93 SEGS. cx no growth to date.  Cefepime added for treatment.  - Chest CT showing right pleural effusion improved, left w increased pleural effusion.   - Per ID, completed treatment of empyema w Cefepime thru 12/8.  - sats remain 97-99% on RA.     Type 2 diabetes mellitus without complication    - Endocrine consulted for management. Appreciate recs.   - hypoglycemia 12/10 requiring D50.    - repeat cx 12/11.       Anemia of chronic disease    - H&H stable. Monitor with daily labs.   - Chest/Abd/pelvis CT 12/4- no fluid to be drainged per transplant surgeon.   No source of bleeding.     - last liver US 11/27. Evolving peritransplant hematomas with anechoic fluid collection adjacent to the right hepatic lobe.  Small volume perihepatic free fluid.         VTE Risk Mitigation (From admission, onward)        Ordered     heparin (porcine) injection 1,000 Units  As needed (PRN)      12/12/18 1731     heparin (porcine) injection 5,000 Units  Every 8 hours      11/30/18 1149     IP VTE HIGH RISK PATIENT  Once      11/11/18 1208          The patients clinical status was discussed at multidisplinary rounds, involving transplant surgery, transplant medicine, pharmacy, nursing, nutrition, and social work    Discharge Planning: Not a candidate for discharge at this time.   No Patient Care Coordination Note on file.      Manda Jackson PA-C  Liver Transplant  [facility]

## 2018-12-18 LAB
ALBUMIN SERPL BCP-MCNC: 2.1 G/DL
ALP SERPL-CCNC: 159 U/L
ALT SERPL W/O P-5'-P-CCNC: 11 U/L
ANION GAP SERPL CALC-SCNC: 11 MMOL/L
AST SERPL-CCNC: 11 U/L
BASOPHILS # BLD AUTO: 0.27 K/UL
BASOPHILS NFR BLD: 2.8 %
BILIRUB SERPL-MCNC: 1.5 MG/DL
BUN SERPL-MCNC: 12 MG/DL
CALCIUM SERPL-MCNC: 8.8 MG/DL
CHLORIDE SERPL-SCNC: 104 MMOL/L
CO2 SERPL-SCNC: 21 MMOL/L
CREAT SERPL-MCNC: 2.2 MG/DL
DIFFERENTIAL METHOD: ABNORMAL
EOSINOPHIL # BLD AUTO: 0.4 K/UL
EOSINOPHIL NFR BLD: 4.5 %
ERYTHROCYTE [DISTWIDTH] IN BLOOD BY AUTOMATED COUNT: 15.5 %
EST. GFR  (AFRICAN AMERICAN): 38.1 ML/MIN/1.73 M^2
EST. GFR  (NON AFRICAN AMERICAN): 33 ML/MIN/1.73 M^2
FUNGUS SPEC CULT: NORMAL
FUNGUS SPEC CULT: NORMAL
GLUCOSE SERPL-MCNC: 88 MG/DL
HCT VFR BLD AUTO: 28.7 %
HGB BLD-MCNC: 8.7 G/DL
IMM GRANULOCYTES # BLD AUTO: 0.16 K/UL
IMM GRANULOCYTES NFR BLD AUTO: 1.6 %
INR PPP: 1.1
LYMPHOCYTES # BLD AUTO: 0.7 K/UL
LYMPHOCYTES NFR BLD: 7.3 %
MAGNESIUM SERPL-MCNC: 1.7 MG/DL
MCH RBC QN AUTO: 28.3 PG
MCHC RBC AUTO-ENTMCNC: 30.3 G/DL
MCV RBC AUTO: 94 FL
MONOCYTES # BLD AUTO: 1.5 K/UL
MONOCYTES NFR BLD: 15 %
NEUTROPHILS # BLD AUTO: 6.7 K/UL
NEUTROPHILS NFR BLD: 68.8 %
NRBC BLD-RTO: 0 /100 WBC
PHOSPHATE SERPL-MCNC: 2.4 MG/DL
PLATELET # BLD AUTO: 433 K/UL
PMV BLD AUTO: 10.8 FL
POCT GLUCOSE: 105 MG/DL (ref 70–110)
POTASSIUM SERPL-SCNC: 3.6 MMOL/L
PROT SERPL-MCNC: 5.5 G/DL
PROTHROMBIN TIME: 11.8 SEC
RBC # BLD AUTO: 3.07 M/UL
SODIUM SERPL-SCNC: 136 MMOL/L
TACROLIMUS BLD-MCNC: 7.5 NG/ML
WBC # BLD AUTO: 9.75 K/UL

## 2018-12-18 PROCEDURE — 25000003 PHARM REV CODE 250: Performed by: PHYSICIAN ASSISTANT

## 2018-12-18 PROCEDURE — 97530 THERAPEUTIC ACTIVITIES: CPT

## 2018-12-18 PROCEDURE — 83735 ASSAY OF MAGNESIUM: CPT

## 2018-12-18 PROCEDURE — 80197 ASSAY OF TACROLIMUS: CPT

## 2018-12-18 PROCEDURE — 94664 DEMO&/EVAL PT USE INHALER: CPT

## 2018-12-18 PROCEDURE — 25000003 PHARM REV CODE 250: Performed by: NURSE PRACTITIONER

## 2018-12-18 PROCEDURE — 63600175 PHARM REV CODE 636 W HCPCS: Performed by: NURSE PRACTITIONER

## 2018-12-18 PROCEDURE — 99233 SBSQ HOSP IP/OBS HIGH 50: CPT | Mod: 24,,, | Performed by: PHYSICIAN ASSISTANT

## 2018-12-18 PROCEDURE — 99223 1ST HOSP IP/OBS HIGH 75: CPT | Mod: ,,, | Performed by: SURGERY

## 2018-12-18 PROCEDURE — 25000003 PHARM REV CODE 250: Performed by: STUDENT IN AN ORGANIZED HEALTH CARE EDUCATION/TRAINING PROGRAM

## 2018-12-18 PROCEDURE — 97112 NEUROMUSCULAR REEDUCATION: CPT

## 2018-12-18 PROCEDURE — 85610 PROTHROMBIN TIME: CPT

## 2018-12-18 PROCEDURE — 97535 SELF CARE MNGMENT TRAINING: CPT

## 2018-12-18 PROCEDURE — 63600175 PHARM REV CODE 636 W HCPCS: Performed by: STUDENT IN AN ORGANIZED HEALTH CARE EDUCATION/TRAINING PROGRAM

## 2018-12-18 PROCEDURE — 85025 COMPLETE CBC W/AUTO DIFF WBC: CPT

## 2018-12-18 PROCEDURE — 63600175 PHARM REV CODE 636 W HCPCS: Performed by: PHYSICIAN ASSISTANT

## 2018-12-18 PROCEDURE — 20600001 HC STEP DOWN PRIVATE ROOM

## 2018-12-18 PROCEDURE — 99233 PR SUBSEQUENT HOSPITAL CARE,LEVL III: ICD-10-PCS | Mod: 24,,, | Performed by: PHYSICIAN ASSISTANT

## 2018-12-18 PROCEDURE — 99900035 HC TECH TIME PER 15 MIN (STAT)

## 2018-12-18 PROCEDURE — 80053 COMPREHEN METABOLIC PANEL: CPT

## 2018-12-18 PROCEDURE — 94799 UNLISTED PULMONARY SVC/PX: CPT

## 2018-12-18 PROCEDURE — 84100 ASSAY OF PHOSPHORUS: CPT

## 2018-12-18 PROCEDURE — 99223 PR INITIAL HOSPITAL CARE,LEVL III: ICD-10-PCS | Mod: ,,, | Performed by: SURGERY

## 2018-12-18 RX ORDER — FUROSEMIDE 10 MG/ML
100 INJECTION INTRAMUSCULAR; INTRAVENOUS ONCE
Status: COMPLETED | OUTPATIENT
Start: 2018-12-18 | End: 2018-12-18

## 2018-12-18 RX ORDER — BACITRACIN 500 [USP'U]/G
OINTMENT TOPICAL
Status: DISCONTINUED | OUTPATIENT
Start: 2018-12-18 | End: 2019-01-08 | Stop reason: HOSPADM

## 2018-12-18 RX ADMIN — HEPARIN SODIUM 5000 UNITS: 5000 INJECTION, SOLUTION INTRAVENOUS; SUBCUTANEOUS at 01:12

## 2018-12-18 RX ADMIN — HEPARIN SODIUM 5000 UNITS: 5000 INJECTION, SOLUTION INTRAVENOUS; SUBCUTANEOUS at 06:12

## 2018-12-18 RX ADMIN — TACROLIMUS 3 MG: 1 CAPSULE ORAL at 08:12

## 2018-12-18 RX ADMIN — ASPIRIN 81 MG CHEWABLE TABLET 81 MG: 81 TABLET CHEWABLE at 08:12

## 2018-12-18 RX ADMIN — TRAZODONE HYDROCHLORIDE 25 MG: 50 TABLET ORAL at 09:12

## 2018-12-18 RX ADMIN — HEPARIN SODIUM 5000 UNITS: 5000 INJECTION, SOLUTION INTRAVENOUS; SUBCUTANEOUS at 09:12

## 2018-12-18 RX ADMIN — TACROLIMUS 3 MG: 1 CAPSULE ORAL at 05:12

## 2018-12-18 RX ADMIN — ACETAMINOPHEN 650 MG: 325 TABLET, FILM COATED ORAL at 06:12

## 2018-12-18 RX ADMIN — ESCITALOPRAM OXALATE 10 MG: 10 TABLET ORAL at 09:12

## 2018-12-18 RX ADMIN — TIZANIDINE 2 MG: 2 TABLET ORAL at 09:12

## 2018-12-18 RX ADMIN — URSODIOL 300 MG: 300 CAPSULE ORAL at 08:12

## 2018-12-18 RX ADMIN — BACITRACIN: 500 OINTMENT TOPICAL at 08:12

## 2018-12-18 RX ADMIN — ISAVUCONAZONIUM SULFATE 372 MG: 186 CAPSULE ORAL at 08:12

## 2018-12-18 RX ADMIN — LIDOCAINE 1 PATCH: 50 PATCH CUTANEOUS at 06:12

## 2018-12-18 RX ADMIN — FUROSEMIDE 100 MG: 10 INJECTION, SOLUTION INTRAMUSCULAR; INTRAVENOUS at 01:12

## 2018-12-18 RX ADMIN — PANTOPRAZOLE SODIUM 40 MG: 40 TABLET, DELAYED RELEASE ORAL at 08:12

## 2018-12-18 RX ADMIN — TRAZODONE HYDROCHLORIDE 50 MG: 50 TABLET ORAL at 09:12

## 2018-12-18 RX ADMIN — PREDNISONE 2.5 MG: 2.5 TABLET ORAL at 08:12

## 2018-12-18 RX ADMIN — TRAMADOL HYDROCHLORIDE 50 MG: 50 TABLET, FILM COATED ORAL at 09:12

## 2018-12-18 RX ADMIN — RAMELTEON 8 MG: 8 TABLET, FILM COATED ORAL at 09:12

## 2018-12-18 RX ADMIN — URSODIOL 300 MG: 300 CAPSULE ORAL at 09:12

## 2018-12-18 NOTE — PROGRESS NOTES
SW met with pts wife to provide printed resource information for her today, as she was not bobby to open the attachments sent to her in email yesterday.    Pts wife reports she is working to have resources lined up and available to her MIL, Ambrosio Enciso ([facility]) who will stay on as pts primary caregiver starting at the first of 2019, as pts wife has decided to return to work 2 weeks early, in able to return later as needed.   pts wife with no other coping issues or psychosocial needs identified at this time.  Pt still has pending [facility] SNF consult, but needs to have consult with vascular surgery first for his hepatic artery.      VITALIY spoke with Jackelyn at [facility] ([phone]) regarding pts referral, she was able to identifiy that pt has been financially cleared, but no chair time has been assigned by [facility] as of yet.  VITALIY will call back when more dc plans in place.  VITALIY remains available for all transplant resources, education and psychosocial support.

## 2018-12-18 NOTE — PLAN OF CARE
Problem: Occupational Therapy Goal  Goal: Occupational Therapy Goal  Goals to be met by: 12/31/18 ( Goals revised: 12/18)    Patient will increase functional independence with ADLs by performing:    UE Dressing with Supervision.  LE Dressing with Minimal Assistance.( met with socks; continue with pants)  Grooming while standing at sink with Stand-by Assistance.  Toileting from toilet with Moderate Assistance for hygiene and clothing management.   Toilet transfer to toilet with moderate assistance.  Upper extremity  theraband exercise program x  15 reps  with independence. Met 12/9          Outcome: Ongoing (interventions implemented as appropriate)  Goals revised. Continue with POC.   Julia ladd OT  12/18/2018

## 2018-12-18 NOTE — CONSULTS
[facility]  Vascular Surgery  Consult Note    Inpatient consult to Vascular Surgery  Consult performed by: Lara Lorenzo MD  Consult ordered by: Manda Jackson PA-C        Subjective:     Chief Complaint/Reason for Admission: elevated velocity in main hepatic artery    History of Present Illness: Femi Enciso is a 53yr old male with PMH of acute ETOH hepatitis and DEL/ATN evolved to ESRD requiring HD (not candidate for KTX). He is now s/p liver transplant 11/11/18 (SM induction, DBD, CMV D+/R-). OR take back 11/16 for bleeding in AM then returned to surgery same day for bleeding, closed with wound vac. OR take back 11/18 for washout and incisional closure, no significant bleeding or hematoma found. OR cultures 11/18  from body fluid grew enterococcus faecium VRE. Stepped down to TSU on POD #15. Vascular surgery is now consulted because routine 1 month post-op U/S showed extrahepatic main hepatic artery demonstrated a maximal elevated peak systolic velocity of maximally 516 cm/s cm/sec, previously 213 cm/s on 11/26/18. LFT's normal.     Medications Prior to Admission   Medication Sig Dispense Refill Last Dose    calcium carbonate/vitamin D3 (VITAMIN D-3 ORAL) Take 1 tablet by mouth once daily.       cyanocobalamin (VITAMIN B-12) 100 MCG tablet Take 100 mcg by mouth once daily.       folic acid (FOLVITE) 1 MG tablet Take 1 mg by mouth once daily.       lactulose (CHRONULAC) 10 gram/15 mL solution Take 20 g by mouth 3 (three) times daily.       multivitamin (THERAGRAN) per tablet Take 1 tablet by mouth once daily.       omeprazole (PRILOSEC) 40 MG capsule Take 40 mg by mouth once daily.       ondansetron (ZOFRAN) 4 MG tablet Take 4 mg by mouth daily as needed for Nausea.       rifAXIMin (XIFAXAN) 550 mg Tab Take 550 mg by mouth 2 (two) times daily.        Review of patient's allergies indicates:   Allergen Reactions    Penicillins Nausea And Vomiting and Rash     Full body rash  from cefepime as well       Past Medical History:   Diagnosis Date    Liver cirrhosis, alcoholic 09/30/2018     Past Surgical History:   Procedure Laterality Date    EGD (ESOPHAGOGASTRODUODENOSCOPY) N/A 11/6/2018    Performed by Duarte Jules MD at [facility]    ESOPHAGOGASTRODUODENOSCOPY N/A 11/6/2018    Procedure: EGD (ESOPHAGOGASTRODUODENOSCOPY);  Surgeon: Duarte Jules MD;  [facility]  Service: Endoscopy;  Laterality: N/A;    EXPLORATORY LAPAROTOMY AFTER LIVER TRANSPLANTATION N/A 11/16/2018    Procedure: LAPAROTOMY, EXPLORATORY, AFTER LIVER TRANSPLANT;  Surgeon: Amadou Lester Jr., MD;  Location: [facility] OR [address] [place] [address];  Service: Transplant;  Laterality: N/A;    EXPLORATORY LAPAROTOMY AFTER LIVER TRANSPLANTATION N/A 11/18/2018    Procedure: LAPAROTOMY, EXPLORATORY, AFTER LIVER TRANSPLANT, LIKELY  ABDOMINAL CLOSURE;  Surgeon: Amadou Lester Jr., MD;  Location: [facility] OR [address] [place] [address];  Service: Transplant;  Laterality: N/A;    INSERTION OF TUNNELED CENTRAL VENOUS HEMODIALYSIS CATHETER N/A 11/7/2018    Procedure: INSERTION, CATHETER, CENTRAL VENOUS, HEMODIALYSIS, TUNNELED;  Surgeon: Brock Gonzalez MD;  Location: [facility] CATH LAB;  Service: Nephrology;  Laterality: N/A;    INSERTION, CATHETER, CENTRAL VENOUS, HEMODIALYSIS, TUNNELED N/A 11/7/2018    Performed by Brock Gonzalez MD at [facility] CATH LAB    LAPAROTOMY, EXPLORATORY N/A 11/16/2018    Procedure: LAPAROTOMY, EXPLORATORY;  Surgeon: Amadou Lester Jr., MD;  Location: [facility] OR [address] [place] [address];  Service: Transplant;  Laterality: N/A;    LAPAROTOMY, EXPLORATORY N/A 11/16/2018    Performed by Amadou Lester Jr., [facility]    LAPAROTOMY, EXPLORATORY, AFTER LIVER TRANSPLANT N/A 11/16/2018    Performed by Amadou Lester Jr., MD at [facility]    LAPAROTOMY, EXPLORATORY, AFTER LIVER TRANSPLANT, LIKELY  ABDOMINAL CLOSURE N/A 11/18/2018    Performed by Amadou Lester Jr., MD at [facility]    LIVER TRANSPLANT  N/A 11/11/2018    Procedure: TRANSPLANT, LIVER;  Surgeon: Shmuel Leary MD;  Location: [facility] OR [address] [place] [address];  Service: Transplant;  Laterality: N/A;    TRANSPLANT, LIVER N/A 11/11/2018    Performed by Shmuel Leary MD at [facility] OR [address] [place] [address]     Family History     None        Tobacco Use    Smoking status: Never Smoker   Substance and Sexual Activity    Alcohol use: Not on file    Drug use: Not on file    Sexual activity: Not on file     Review of Systems   Constitutional: Positive for activity change and appetite change (improving).   HENT: Negative for congestion.  Eyes: Negative for pain, discharge and visual disturbance.   Respiratory: Negative for cough.  Cardiovascular:  Negative for chest pain, palpitations.  Gastrointestinal: Negative for abdominal pain, constipation, diarrhea, nausea and vomiting.   Genitourinary: Positive for decreased urine volume.   Musculoskeletal: Negative for back pain, gait problem.  Skin: Negative for color change and pallor.   Allergic/Immunologic: Positive for immunocompromised state.   Neurological: Positive for weakness.   Psychiatric/Behavioral: Negative for behavioral problems, confusion, decreased concentration, dysphoric mood, hallucinations, sleep disturbance and suicidal ideas. The patient is not nervous/anxious.      Objective:     Vital Signs (Most Recent):  Temp: 98.1 °F (36.7 °C) (12/18/18 1207)  Pulse: 88 (12/18/18 1207)  Resp: 20 (12/18/18 1207)  BP: 117/76 (12/18/18 1207)  SpO2: 98 % (12/18/18 1207) Vital Signs (24h Range):  Temp:  [98 °F (36.7 °C)-98.7 °F (37.1 °C)] 98.1 °F (36.7 °C)  Pulse:  [88-96] 88  Resp:  [19-27] 20  SpO2:  [97 %-99 %] 98 %  BP: (112-134)/(76-87) 117/76     Weight: 70 kg (154 lb 5.2 oz)  Body mass index is 22.14 kg/m².    Physical Exam   Constitutional: He is oriented to person, place, and time. He appears cachectic. He appears ill. No distress.   HENT:   Head: Normocephalic and atraumatic.   Eyes: No scleral icterus.    Cardiovascular: Normal rate.   Pulmonary/Chest: No stridor. No respiratory distress.   Abdominal: Soft. He exhibits no distension. There is no tenderness.   Chevron incision healing well   Musculoskeletal: He exhibits no edema (trace bilateral pitting edema).   Neurological: He is alert and oriented to person, place, and time.   Skin: Skin is warm and dry. Capillary refill takes less than 2 seconds.     Significant Labs:  CBC:   Recent Labs   Lab 12/18/18  0640   WBC 9.75   RBC 3.07*   HGB 8.7*   HCT 28.7*   *   MCV 94   MCH 28.3   MCHC 30.3*     CMP:   Recent Labs   Lab 12/18/18  0640   GLU 88   CALCIUM 8.8   ALBUMIN 2.1*   PROT 5.5*      K 3.6   CO2 21*      BUN 12   CREATININE 2.2*   ALKPHOS 159*   ALT 11   AST 11   BILITOT 1.5*     Coagulation:   Recent Labs   Lab 12/18/18  0640   LABPROT 11.8   INR 1.1   All pertinent labs from the last 24 hours have been reviewed.    Significant Diagnostics:  I have reviewed and interpreted all pertinent imaging results/findings within the past 24 hours.    Assessment/Plan:     * S/P liver transplant    54 y/o male s/p OLT 11/11/18 complicated by 2 takebacks for bleeding with delayed abdominal closure now in the TSU doing well. Vascular surgery consulted for increased velocity  (516 from 213) in the extrahepatic main hepatic artery found incidentally on routine 1 month post-op liver U/S.     - will review imaging and discuss case with Dr. Keen  - recommendations to follow         Thank you for your consult.    Lara Chauhan MD  Vascular Surgery  [facility]

## 2018-12-18 NOTE — NURSING
Consult received for drain removal site.    PMH: ETOH hepatitis and DEL/ATN evolved to ESRD requiring HD, s/p liver transplant.    Patient is seen on the transplant step down unit. Patient with drain removal site open to air. Bacitracin has been used to the area. There are two small areas seen. The largers approx 0.8 x 0.8 x 0.2 cm with 50% slough 50% pink, second area is an approx 0.6 x 0.6 cm scab. Wounds cleansed with normal saline and gauze, bacitracin and foam applied.         Recommend:  Would recommend keeping covered with foam dressing to promote autolytic debridement. Will defer to NP if they would like to change wound care orders.     Wound care signiing off.  Lisa Chakraborty RN [facility]   [phone]

## 2018-12-18 NOTE — PROGRESS NOTES
[facility]  Liver Transplant  Progress Note    Patient Name: Femi Enciso  MRN: [id number]  Admission Date: 10/27/2018  Hospital Length of Stay: 52 days  Code Status: Full Code  Primary Care Provider: Primary Doctor No  Post-Operative Day: 37    ORGAN:   LIVER  Disease Etiology: Alcoholic Cirrhosis  Donor Type:    - Brain Death  CDC High Risk:   No  Donor CMV Status:   Donor CMV Status: Positive  Donor HBcAB:   Negative  Donor HCV Status:   Negative  Donor HBV JAVIER: Negative  Donor HCV JAVIER: Negative  Whole or Partial: Whole Liver  Biliary Anastomosis: End to End  Arterial Anatomy: Standard  Subjective:     History of Present Illness:  Femi Enciso is a 53yr old male with PMH of acute ETOH hepatitis and DEL/ATN evolved to ESRD requiring HD (not candidate for KTX). He is now s/p liver transplant (SM induction, DBD, CMV D+/R-). Transplant surgery noted severe collateralization, adrenal gland firmly adhered to liver. Bare area of adrenal gland required several stitches and packing to obtain fair hemostasis (EBL 15L). OR take back  for bleeding from R adrenal bed in AM (significant clot in retroperitoneum, posterior to R hepatic lobe, tract posterior to the R kidney containing significant clot, small bleeding area of retroperitoneal fat) then returned to surgery same day for hemorrhaging closed with wound vac with plans to return to OR 24-48 hours for closure (retroperitoneal /retrorenal/retrocolic hematoma on the right ). Raw retroperitoneal bleeding. Suture ligatures placed, argon beam cautery, evarest placed in retroperitoneum behind cava and right kidney. Significant oozing from upper right adrenal gland, not amenable to ligation, that portion of the adrenal gland was resected with cautery). OR take back  for washout and incisional closure, no significant bleeding or hematoma found. OR cultures   from body fluid grew enterococcus faecium VRE. ID was consulted,  started on  daptomycin for VRE treatment and cefepime/flagyl to cover for IA ty in bile. Patient with significant leukocytosis with peak on 11/22 at 48K prompting drainage of R subphrenic fluid collection, perc drain placed, culture grew VRE. Stroke code called 11/21 for lethargy and unresponsive, CT head without evidence of acute ischemic changes and CTA chest negative, vascular neurology was consulted recommended MRI brain, but patient's AMS improved shortly after event. Nephrology consulted for ESRD requiring HD. Patient overall improved on antibiotic regimen, leukocytosis and AMS improved. He was transferred to TSU on POD #15.     Hospital Course:  Interval History:  No acute events overnight. Patient feeling well today without complaint. Routine month 1 Liver US obtained yesterday which showed elevated velocity of hepatic artery and low RIs. Will consult Vascular Surgery for help with management. Appreciate their assistance. Patient and caregiver reporting increase in UOP, ~250 cc yesterday. Discussed with Nephrology. Plan for lasix challenge today. Continue aggressive PT/OT.       Scheduled Meds:   aspirin  81 mg Oral Daily    epoetin sherlyn (PROCRIT) injection  50 Units/kg (Dosing Weight) Intravenous Every Mon, Wed, Fri    ergocalciferol  50,000 Units Oral Q7 Days    escitalopram oxalate  10 mg Oral Nightly    heparin (porcine)  5,000 Units Subcutaneous Q8H    isavuconazonium sulfate  372 mg Oral Daily    lidocaine  1 patch Transdermal Q24H    pantoprazole  40 mg Oral Daily    predniSONE  2.5 mg Oral Daily    sulfamethoxazole-trimethoprim 400-80mg  1 tablet Oral Every Mon, Wed, Fri    tacrolimus  3 mg Oral BID    traZODone  50 mg Oral QHS    ursodiol  300 mg Oral BID    valganciclovir 50 mg/ml  100 mg Oral Once per day on Mon Wed Fri     Continuous Infusions:  PRN Meds:acetaminophen, albuterol-ipratropium, artificial tears, bacitracin, bisacodyl, heparin (porcine), midodrine, ondansetron, ramelteon,  tiZANidine, traMADol, traZODone    Review of Systems   Constitutional: Positive for activity change and appetite change (improving). Negative for fatigue and fever.   HENT: Negative for congestion, facial swelling and voice change.    Eyes: Negative for pain, discharge and visual disturbance.   Respiratory: Negative for apnea, cough, choking, chest tightness, shortness of breath and wheezing.    Cardiovascular: Negative.  Negative for chest pain, palpitations and leg swelling.   Gastrointestinal: Negative for abdominal distention, abdominal pain, constipation, diarrhea, nausea and vomiting.   Endocrine: Negative.    Genitourinary: Positive for decreased urine volume. Negative for difficulty urinating, dysuria, hematuria and urgency.   Musculoskeletal: Negative.  Negative for back pain, gait problem, neck pain and neck stiffness.   Skin: Positive for wound. Negative for color change and pallor.   Allergic/Immunologic: Positive for immunocompromised state.   Neurological: Positive for weakness. Negative for dizziness, seizures, light-headedness and headaches.   Psychiatric/Behavioral: Negative for behavioral problems, confusion, decreased concentration, dysphoric mood, hallucinations, sleep disturbance and suicidal ideas. The patient is not nervous/anxious.      Objective:     Vital Signs (Most Recent):  Temp: 98.1 °F (36.7 °C) (12/18/18 1207)  Pulse: 88 (12/18/18 1207)  Resp: 20 (12/18/18 1207)  BP: 117/76 (12/18/18 1207)  SpO2: 98 % (12/18/18 1207) Vital Signs (24h Range):  Temp:  [98 °F (36.7 °C)-98.7 °F (37.1 °C)] 98.1 °F (36.7 °C)  Pulse:  [88-96] 88  Resp:  [19-27] 20  SpO2:  [97 %-99 %] 98 %  BP: (112-134)/(76-87) 117/76     Weight: 70 kg (154 lb 5.2 oz)  Body mass index is 22.14 kg/m².    Intake/Output - Last 3 Shifts       12/16 0700 - 12/17 0659 12/17 0700 - 12/18 0659 12/18 0700 - 12/19 0659    P.O. 500 780 100    I.V. (mL/kg) 50 (0.7)      Other  600     Total Intake(mL/kg) 550 (7.9) 1380 (19.7) 100 (1.4)     Urine (mL/kg/hr) 50 (0)      Emesis/NG output       Other  2468     Stool 0      Total Output 50 2468     Net +500 -1088 +100           Urine Occurrence  0 x 0 x    Stool Occurrence 1 x 0 x 0 x          Physical Exam   Constitutional: He is oriented to person, place, and time. He appears well-developed. No distress.   Temporal and distal extremity muscle wasting noted.   HENT:   Head: Normocephalic and atraumatic.   Eyes: Pupils are equal, round, and reactive to light.   Neck: Normal range of motion. Neck supple. No JVD present.   Cardiovascular: Normal rate, regular rhythm and normal heart sounds.   No murmur heard.  Pulmonary/Chest: Effort normal. No respiratory distress. He has decreased breath sounds in the right lower field and the left lower field. He has no wheezes. He exhibits no tenderness.   Abdominal: Soft. Bowel sounds are normal. He exhibits no distension. There is no tenderness.   Chevron inc clean, dry, intact with steri strips in place     Musculoskeletal: Normal range of motion. He exhibits no tenderness. Edema: trace ankle edema.   Neurological: He is alert and oriented to person, place, and time. He has normal reflexes.   Skin: Skin is warm and dry. Capillary refill takes 2 to 3 seconds. He is not diaphoretic. No pallor.   Psychiatric: He has a normal mood and affect. His behavior is normal. Judgment and thought content normal. His affect is not labile. He is not slowed. Cognition and memory are not impaired.   Nursing note and vitals reviewed.      Laboratory:  Immunosuppressants         Stop Route Frequency     tacrolimus capsule 3 mg      -- Oral 2 times daily        CBC:   Recent Labs   Lab 12/18/18  0640   WBC 9.75   RBC 3.07*   HGB 8.7*   HCT 28.7*   *   MCV 94   MCH 28.3   MCHC 30.3*     CMP:   Recent Labs   Lab 12/18/18  0640   GLU 88   CALCIUM 8.8   ALBUMIN 2.1*   PROT 5.5*      K 3.6   CO2 21*      BUN 12   CREATININE 2.2*   ALKPHOS 159*   ALT 11   AST 11   BILITOT  1.5*     Labs within the past 24 hours have been reviewed.    Diagnostic Results:  I have personally reviewed all pertinent imaging studies.    Assessment/Plan:     * S/P liver transplant    - LFTs now improving slowly.  T bili was 37.6 day of transplant.  - Drain placed during take back. Drain placed to intra-abdominal abscess on 11/22.   - Fluid from collection noted with enterococcus VRE completed Zyvox treatment.  -Routine month 1 Liver US obtained 12/17 which showed elevated velocity of hepatic artery and low RIs. Will consult Vascular Surgery for help with management     Hypomagnesemia    - Improved. Continue to monitor.      Severe malnutrition    Dietician following. Severe temporal, clavicle muscle depletion noted. Severe subq fat loss  -Per dietician recs, will liberalize diet from low Na. Will have to do low potassium though for renal function  -Appreciate dietician assistance.       Anxiety    - Restarted Lexapro as per psych recs, 12/13.        Acute renal failure with acute tubular necrosis superimposed on stage 3 chronic kidney disease    - 2 weeks for DEL prior to transplant  - Renal function slow to recover post-op.   - Nephrology consulted for management.   - Cont with HD as per nephrology recs.  Apprec recs.    - Lasix 160 mg challenge 11/28 w/ minimal output.    - HD MWF.   -Plan for Lasix challenge today 12/18.   - Strict I&Os.      Intra-abdominal abscess    - Significant increase in WBC post-op.   - OR cultures from 11/18 noted with enterococcus VRE.   - Abdominal CT performed at that time with fluid collection and drain placed on 11/22 for drainage.   - Intra-abdominal abscess culture also with enterococcus VRE.   - ID consulted and pt placed on antibxs for treatment. Pt was on Cefepime, Daptomycin, and Fluconazole for treatment.    - Pt remains with RLQ drains (one JOSE A and one Percutaneous).  One JOSE A removed 11/28.  Perc drain in placed draining tan, clear drainage.  WBC slowly improving.   -  Liver US on 11/26 reviewed.   - Abdominal CT performed 11/27 and reviewed. Fluid collection noted with improvement on CT.  ID following and reassured by findings.    - Dapto, Cefepime and Flagyl changed 11/30/18 to Linezolid 600 mg PO q 12 hr x 10 days per ID.     - added back cefepime after thora.  ID re consulted.  - Completed Zyvox x 10 days per ID thru 12/9/18. Monitor.      Long-term use of immunosuppressant medication    - Cont with prograf. Draw prograf level daily and adjust dose as needed to maintain a therapeutic level.        At risk for opportunistic infections    - Cont with bactrim and valcyte for protection against opportunistic infections.   - cont Isavuconazonium.     Adrenal cortical steroids causing adverse effect in therapeutic use           Prophylactic immunotherapy    - See long term immunosuppression.        Debility    - Pt remains significantly debilitated.   - PT/OT for strengthening.   - Currently will need SNF for placement and further rehabilitation.  Insurance does not cover Rehab.  - Pt remains severely debilitated and not strong enough for SNF at this time.   - Cont with strengthening  -SNF consult placed 12/17.        Pleural effusion associated with hepatic disorder    - c/o increased pain w deep breath today to right side.  CXR showed increased opacity in the inferior hemothorax on the right side since 11/26/2018.  Pulse ox 97-99% on RA.  Cont to monitor.   - continues to have fluid to RLL.  WBC remains elevated.    - thoracentesis performed on 11/30. Fluid noted with 4k WBC, 93 SEGS. cx no growth to date.  Cefepime added for treatment.  - Chest CT showing right pleural effusion improved, left w increased pleural effusion.   - Per ID, completed treatment of empyema w Cefepime thru 12/8.  - sats remain 97-99% on RA.     Type 2 diabetes mellitus without complication    - Endocrine consulted for management. Appreciate recs.   - hypoglycemia 12/10 requiring D50.    - repeat cx  12/11.       Anemia of chronic disease    - H&H stable. Monitor with daily labs.   - Chest/Abd/pelvis CT 12/4- no fluid to be drainged per transplant surgeon.  No source of bleeding.     - last liver US 11/27. Evolving peritransplant hematomas with anechoic fluid collection adjacent to the right hepatic lobe.  Small volume perihepatic free fluid.         VTE Risk Mitigation (From admission, onward)        Ordered     heparin (porcine) injection 1,000 Units  As needed (PRN)      12/12/18 1731     heparin (porcine) injection 5,000 Units  Every 8 hours      11/30/18 1149     IP VTE HIGH RISK PATIENT  Once      11/11/18 1208          The patients clinical status was discussed at multidisplinary rounds, involving transplant surgery, transplant medicine, pharmacy, nursing, nutrition, and social work    Discharge Planning: Not a candidate for discharge at this time.   No Patient Care Coordination Note on file.      Manda Jackson PA-C  Liver Transplant  [facility]

## 2018-12-18 NOTE — NURSING
RN assisting pt to recliner when pt's R knee buckled and RN assisted pt to floor.  Pt denies tenderness to buttocks/hips, bilat knees, and head.  RN and pt's wife assisted pt into recliner, pt reports comfort and denies other need.  RN will continue to monitor.

## 2018-12-18 NOTE — HPI
Femi Enciso is a 53yr old male with PMH of acute ETOH hepatitis and DEL/ATN evolved to ESRD requiring HD (not candidate for KTX). He is now s/p liver transplant 11/11/18 (SM induction, DBD, CMV D+/R-). OR take back 11/16 for bleeding in AM then returned to surgery same day for bleeding, closed with wound vac. OR take back 11/18 for washout and incisional closure, no significant bleeding or hematoma found. OR cultures 11/18  from body fluid grew enterococcus faecium VRE. Stepped down to TSU on POD #15. Vascular surgery is now consulted because routine 1 month post-op U/S showed extrahepatic main hepatic artery demonstrated a maximal elevated peak systolic velocity of maximally 516 cm/s cm/sec, previously 213 cm/s on 11/26/18. LFT's normal.

## 2018-12-18 NOTE — SUBJECTIVE & OBJECTIVE
Scheduled Meds:   aspirin  81 mg Oral Daily    epoetin sherlyn (PROCRIT) injection  50 Units/kg (Dosing Weight) Intravenous Every Mon, Wed, Fri    ergocalciferol  50,000 Units Oral Q7 Days    escitalopram oxalate  10 mg Oral Nightly    heparin (porcine)  5,000 Units Subcutaneous Q8H    isavuconazonium sulfate  372 mg Oral Daily    lidocaine  1 patch Transdermal Q24H    pantoprazole  40 mg Oral Daily    predniSONE  2.5 mg Oral Daily    sulfamethoxazole-trimethoprim 400-80mg  1 tablet Oral Every Mon, Wed, Fri    tacrolimus  3 mg Oral BID    traZODone  50 mg Oral QHS    ursodiol  300 mg Oral BID    valganciclovir 50 mg/ml  100 mg Oral Once per day on Mon Wed Fri     Continuous Infusions:  PRN Meds:acetaminophen, albuterol-ipratropium, artificial tears, bacitracin, bisacodyl, heparin (porcine), midodrine, ondansetron, ramelteon, tiZANidine, traMADol, traZODone    Review of Systems   Constitutional: Positive for activity change and appetite change (improving). Negative for fatigue and fever.   HENT: Negative for congestion, facial swelling and voice change.    Eyes: Negative for pain, discharge and visual disturbance.   Respiratory: Negative for apnea, cough, choking, chest tightness, shortness of breath and wheezing.    Cardiovascular: Negative.  Negative for chest pain, palpitations and leg swelling.   Gastrointestinal: Negative for abdominal distention, abdominal pain, constipation, diarrhea, nausea and vomiting.   Endocrine: Negative.    Genitourinary: Positive for decreased urine volume. Negative for difficulty urinating, dysuria, hematuria and urgency.   Musculoskeletal: Negative.  Negative for back pain, gait problem, neck pain and neck stiffness.   Skin: Positive for wound. Negative for color change and pallor.   Allergic/Immunologic: Positive for immunocompromised state.   Neurological: Positive for weakness. Negative for dizziness, seizures, light-headedness and headaches.   Psychiatric/Behavioral:  Negative for behavioral problems, confusion, decreased concentration, dysphoric mood, hallucinations, sleep disturbance and suicidal ideas. The patient is not nervous/anxious.      Objective:     Vital Signs (Most Recent):  Temp: 98.1 °F (36.7 °C) (12/18/18 1207)  Pulse: 88 (12/18/18 1207)  Resp: 20 (12/18/18 1207)  BP: 117/76 (12/18/18 1207)  SpO2: 98 % (12/18/18 1207) Vital Signs (24h Range):  Temp:  [98 °F (36.7 °C)-98.7 °F (37.1 °C)] 98.1 °F (36.7 °C)  Pulse:  [88-96] 88  Resp:  [19-27] 20  SpO2:  [97 %-99 %] 98 %  BP: (112-134)/(76-87) 117/76     Weight: 70 kg (154 lb 5.2 oz)  Body mass index is 22.14 kg/m².    Intake/Output - Last 3 Shifts       12/16 0700 - 12/17 0659 12/17 0700 - 12/18 0659 12/18 0700 - 12/19 0659    P.O. 500 780 100    I.V. (mL/kg) 50 (0.7)      Other  600     Total Intake(mL/kg) 550 (7.9) 1380 (19.7) 100 (1.4)    Urine (mL/kg/hr) 50 (0)      Emesis/NG output       Other  2468     Stool 0      Total Output 50 2468     Net +500 -1088 +100           Urine Occurrence  0 x 0 x    Stool Occurrence 1 x 0 x 0 x          Physical Exam   Constitutional: He is oriented to person, place, and time. He appears well-developed. No distress.   Temporal and distal extremity muscle wasting noted.   HENT:   Head: Normocephalic and atraumatic.   Eyes: Pupils are equal, round, and reactive to light.   Neck: Normal range of motion. Neck supple. No JVD present.   Cardiovascular: Normal rate, regular rhythm and normal heart sounds.   No murmur heard.  Pulmonary/Chest: Effort normal. No respiratory distress. He has decreased breath sounds in the right lower field and the left lower field. He has no wheezes. He exhibits no tenderness.   Abdominal: Soft. Bowel sounds are normal. He exhibits no distension. There is no tenderness.   Chevron inc clean, dry, intact with steri strips in place     Musculoskeletal: Normal range of motion. He exhibits no tenderness. Edema: trace ankle edema.   Neurological: He is alert and  oriented to person, place, and time. He has normal reflexes.   Skin: Skin is warm and dry. Capillary refill takes 2 to 3 seconds. He is not diaphoretic. No pallor.   Psychiatric: He has a normal mood and affect. His behavior is normal. Judgment and thought content normal. His affect is not labile. He is not slowed. Cognition and memory are not impaired.   Nursing note and vitals reviewed.      Laboratory:  Immunosuppressants         Stop Route Frequency     tacrolimus capsule 3 mg      -- Oral 2 times daily        CBC:   Recent Labs   Lab 12/18/18  0640   WBC 9.75   RBC 3.07*   HGB 8.7*   HCT 28.7*   *   MCV 94   MCH 28.3   MCHC 30.3*     CMP:   Recent Labs   Lab 12/18/18  0640   GLU 88   CALCIUM 8.8   ALBUMIN 2.1*   PROT 5.5*      K 3.6   CO2 21*      BUN 12   CREATININE 2.2*   ALKPHOS 159*   ALT 11   AST 11   BILITOT 1.5*     Labs within the past 24 hours have been reviewed.    Diagnostic Results:  I have personally reviewed all pertinent imaging studies.

## 2018-12-18 NOTE — ASSESSMENT & PLAN NOTE
- 2 weeks for DEL prior to transplant  - Renal function slow to recover post-op.   - Nephrology consulted for management.   - Cont with HD as per nephrology recs.  Apprec recs.    - Lasix 160 mg challenge 11/28 w/ minimal output.    - HD MWF.   -Plan for Lasix challenge today 12/18.   - Strict I&Os.

## 2018-12-18 NOTE — PLAN OF CARE
Problem: Patient Care Overview  Goal: Plan of Care Review  Outcome: Ongoing (interventions implemented as appropriate)  Pt AAOx4, VSS, in NAD throughout shift.  Pt up in recliner x 3-4 hours this shift.  Pt's spouse pulls self-meds at 100%.  Pt tolerating all medications and interventions well.  RN encouraging pt to get out of bed as much as possible, pt does not seem interested in activity.  RN maintaining fall risk precautions throughout shift.  Pt denies pain or other need at this time; RN will continue to monitor, assess, and alter plan of care as needed until report given to oncoming night shift nurse.

## 2018-12-18 NOTE — PT/OT/SLP PROGRESS
Occupational Therapy   Treatment    Name: Femi Enciso  MRN: [id number]  Admitting Diagnosis:  S/P liver transplant  30 Days Post-Op    Recommendations:     Discharge Recommendations: nursing facility, skilled  Discharge Equipment Recommendations:  wheelchair, manual(bedside commode; bath bench)  Barriers to discharge:  None    Subjective     Pain/Comfort:  · Pain Rating 1: 0/10  · Pain Rating Post-Intervention 1: 0/10    Objective:     Communicated with: RN prior to session.  Patient found with all lines intact, call button in reach and Rn notified and telemetry, pulse ox (continuous) upon OT entry to room.    General Precautions: Standard, fall   Orthopedic Precautions:N/A   Braces: N/A     Occupational Performance:    Bed Mobility:    · NT, pt found seated UIC upon arrival.     Functional Mobility/Transfers:  · Patient completed Sit <> Stand Transfer with moderate assistance and of 2 persons with no AD.   · Functional Mobility: NT this date 2/2 pt becoming anxious and increased WOB during each standing trial    Activities of Daily Living:  · Grooming: Mod A x2 for standing balance to perform oral care while standing ( table top in front); Attempted x2 trials however unable to tolerate standing position for increased length of time requiring completion while seated in chair  · Upper Body Dressing: minimum assistance to milady gown like jacket while seated UIC  · Lower Body Dressing: minimum assistance to milady B socks while seated UIC ( using crossing leg method)    [facility] 6 Click ADL: 17    Treatment & Education:  -Pt edu on OT role/POC  -Importance of OOB activity with staff assistance  -Safety during functional t/f and mobility ( max cues provided for proper hand placement for decent)   - White board updated  - Multiple self care tasks completed-- assistance level noted above  - Reviewed with pt/family on the importance of completing BUE/BLE HEP 3x per day       Patient left up in chair with all lines intact, call  button in reach and Rn notified  Education:    Assessment:     Femi Enciso is a 53 y.o. male with a medical diagnosis of S/P liver transplant. Pt alert and willing to participate in therapy session this date. Pt displays increased anxiousness during each standing trial requiring constant cues for pursed lip breathing and overall encouragement.  He presents with the following performance deficits affecting function are weakness, impaired endurance, impaired functional mobilty, gait instability, impaired self care skills, impaired balance, impaired cognition, decreased safety awareness, decreased lower extremity function. He would benefit from SNF following d/c to continue to progress towards goals and improve quality of life.     Rehab Prognosis:  Good; patient would benefit from acute skilled OT services to address these deficits and reach maximum level of function.       Plan:     Patient to be seen 4 x/week to address the above listed problems via self-care/home management, therapeutic activities, therapeutic exercises, neuromuscular re-education  · Plan of Care Expires: 12/21/18  · Plan of Care Reviewed with: patient, spouse, mother    This Plan of care has been discussed with the patient who was involved in its development and understands and is in agreement with the identified goals and treatment plan    GOALS:   Multidisciplinary Problems     Occupational Therapy Goals        Problem: Occupational Therapy Goal    Goal Priority Disciplines Outcome Interventions   Occupational Therapy Goal     OT, PT/OT Ongoing (interventions implemented as appropriate)    Description:  Goals to be met by: 12/31/18 ( Goals revised: 12/18)    Patient will increase functional independence with ADLs by performing:    UE Dressing with Supervision.  LE Dressing with Minimal Assistance.( met with socks; continue with pants)  Grooming while standing at sink with Stand-by Assistance.  Toileting from toilet with Moderate Assistance for  hygiene and clothing management.   Toilet transfer to toilet with moderate assistance.  Upper extremity  theraband exercise program x  15 reps  with independence. Met 12/9                      Multidisciplinary Problems (Resolved)        Problem: Occupational Therapy Goal    Goal Priority Disciplines Outcome Interventions   Occupational Therapy Goal   (Resolved)     OT, PT/OT Resolved for Upgrade                    Time Tracking:     OT Date of Treatment: 12/18/18  OT Start Time: 1025  OT Stop Time: 1057  OT Total Time (min): 32 min    Billable Minutes:Self Care/Home Management 20  Therapeutic Activity 12    Julia ladd OT  12/18/2018

## 2018-12-18 NOTE — ASSESSMENT & PLAN NOTE
54 y/o male s/p OLT 11/11/18 complicated by 2 takebacks for bleeding with delayed abdominal closure now in the TSU doing well. Vascular surgery consulted for increased velocity  (516 from 213) in the extrahepatic main hepatic artery found incidentally on routine 1 month post-op liver U/S.     - will review imaging and discuss case with Dr. Keen  - recommendations to follow

## 2018-12-18 NOTE — PT/OT/SLP PROGRESS
"Physical Therapy Treatment    Patient Name:  Femi Enciso   MRN:  [id number]    Recommendations:     Discharge Recommendations:  nursing facility, skilled   Discharge Equipment Recommendations: (TBD)   Barriers to discharge: Decreased caregiver support at current functional level    Assessment:     Femi Enciso is a 53 y.o. male admitted with a medical diagnosis of S/P liver transplant.  He presents with the following impairments/functional limitations:  weakness, gait instability, impaired endurance, impaired balance, impaired self care skills, impaired functional mobilty, decreased safety awareness, decreased lower extremity function. Pt completed multiple standing trials this date with assist of 2 needed to achieve and maintain standing position. Attempted dynamic balance tasks in standing but pt with increased anxiety this date and difficulty maintaining standing position. Unable to take steps this date. Pt would continue to benefit from skilled acute PT in order to address current deficits and progress functional mobility.     Rehab Prognosis:  good; patient would benefit from acute skilled PT services to address these deficits and reach maximum level of function.      Recent Surgery: Procedure(s) (LRB):  LAPAROTOMY, EXPLORATORY, AFTER LIVER TRANSPLANT, LIKELY  ABDOMINAL CLOSURE (N/A) 30 Days Post-Op    Plan:     During this hospitalization, patient to be seen 4 x/week to address the above listed problems via gait training, therapeutic activities, therapeutic exercises, neuromuscular re-education  · Plan of Care Expires:  01/17/19   Plan of Care Reviewed with: patient, spouse, mother    Subjective     Communicated with RN prior to session.  Patient found UIC upon PT entry to room, agreeable to treatment.      Chief Complaint: LE weakness   Patient comments/goals: "I need to sit."  Pain/Comfort:  · Pain Rating 1: 0/10    Patients cultural, spiritual, Evangelical conflicts given the current situation: none noted "     Objective:     Patient found with: telemetry, pulse ox (continuous)     General Precautions: Standard, fall   Orthopedic Precautions:N/A   Braces: N/A     Functional Mobility:  · Transfers:     · Sit to Stand:  moderate assistance and of 2 persons with no AD, x5 reps  · Gait: unable to perform this date 2* increased anxiousness in standing and pt requesting to return to sit  · Balance: standing: modAx2      AM-PAC 6 CLICK MOBILITY  Turning over in bed (including adjusting bedclothes, sheets and blankets)?: 3  Sitting down on and standing up from a chair with arms (e.g., wheelchair, bedside commode, etc.): 2  Moving from lying on back to sitting on the side of the bed?: 3  Moving to and from a bed to a chair (including a wheelchair)?: 2  Need to walk in hospital room?: 2  Climbing 3-5 steps with a railing?: 1  Basic Mobility Total Score: 13       Therapeutic Activities and Exercises:   Performed standing x5 trials with modAx2 for maintaining standing. B knees blocked throughout to prevent buckling. Max cues provided for postural control, increased hip and knee ext, glute activation, and pursed lip breathing. Increased anxiousness noted in standing this date; pt at times returning himself to sit 2* reporting fear of falling, despite max cues to maintain upright. Max encouragement provided to maintain standing and progress activity. Attempted self-care tasks in standing; required at least unilateral UE support throughout. Extended seated rest breaks required between standing trials.     Patient left up in chair with all lines intact, call button in reach and pt's wife and mother present..    GOALS:   Multidisciplinary Problems     Physical Therapy Goals        Problem: Physical Therapy Goal    Goal Priority Disciplines Outcome Goal Variances Interventions   Physical Therapy Goal     PT, PT/OT Ongoing (interventions implemented as appropriate)     Description:  Goals to be met by: 12/28/2018     Patient will  increase functional independence with mobility by performin. Supine to sit with Modified [place] -not met  2. Sit to stand transfer with [place] -not met  3. Bed to chair transfer with independence using no AD -not met  4. Gait  x150 feet with Supervision using LRAD. -not met  5. Lower extremity exercise program x20 reps per handout, with independence -not met                     Multidisciplinary Problems (Resolved)        Problem: Physical Therapy Goal    Goal Priority Disciplines Outcome Goal Variances Interventions   Physical Therapy Goal   (Resolved)     PT, PT/OT Resolved for Upgrade                     Time Tracking:     PT Received On: 18  PT Start Time: 1027     PT Stop Time: 1059  PT Total Time (min): 32 min     Billable Minutes: Therapeutic Activity 8 and Neuromuscular Re-education 15   (co-tx with OT)    Treatment Type: Treatment  PT/PTA: PT     PTA Visit Number: 0     Ramandeep Kumar, PT, DPT   2018  [phone]

## 2018-12-18 NOTE — PROGRESS NOTES
[facility]FF HEMODIALYSIS NOTE   Patient was seen 12/17 on hemodialysis  Patient currently on hemodialysis for removal of uremic toxins and volume.    Patient seen and evaluated on hemodialysis, tolerating treatment, see HD flowsheet for vitals and assessments.      Ultrafiltration goal is 2L     Labs have been reviewed and the dialysate bath has been adjusted.     Assessment/Plan:     HD today for removal of uremic toxins and volume.

## 2018-12-18 NOTE — PLAN OF CARE
Problem: Patient Care Overview  Goal: Plan of Care Review  Outcome: Ongoing (interventions implemented as appropriate)  Pt is AAOx3.Pt needs stand by assistance only. CBG monitored AC HS.  SS coverage given when appropriate.Standard precautions maintained.  No breakdown noted, po fluids encouraged.Pt is in good spirits.  NAD noted.Pt turns independently, pt is aware of bony area and pressure reduction positions Pt remains injury and fall free, non skid footwear donned, call light within reach, personal items within reach, bed in low/locked position, pt able to voice needs all needs voiced have been met at this time.

## 2018-12-18 NOTE — ASSESSMENT & PLAN NOTE
- LFTs now improving slowly.  T bili was 37.6 day of transplant.  - Drain placed during take back. Drain placed to intra-abdominal abscess on 11/22.   - Fluid from collection noted with enterococcus VRE completed Zyvox treatment.  -Routine month 1 Liver US obtained 12/17 which showed elevated velocity of hepatic artery and low RIs. Will consult Vascular Surgery for help with management

## 2018-12-18 NOTE — SUBJECTIVE & OBJECTIVE
Medications Prior to Admission   Medication Sig Dispense Refill Last Dose    calcium carbonate/vitamin D3 (VITAMIN D-3 ORAL) Take 1 tablet by mouth once daily.       cyanocobalamin (VITAMIN B-12) 100 MCG tablet Take 100 mcg by mouth once daily.       folic acid (FOLVITE) 1 MG tablet Take 1 mg by mouth once daily.       lactulose (CHRONULAC) 10 gram/15 mL solution Take 20 g by mouth 3 (three) times daily.       multivitamin (THERAGRAN) per tablet Take 1 tablet by mouth once daily.       omeprazole (PRILOSEC) 40 MG capsule Take 40 mg by mouth once daily.       ondansetron (ZOFRAN) 4 MG tablet Take 4 mg by mouth daily as needed for Nausea.       rifAXIMin (XIFAXAN) 550 mg Tab Take 550 mg by mouth 2 (two) times daily.        Review of patient's allergies indicates:   Allergen Reactions    Penicillins Nausea And Vomiting and Rash     Full body rash from cefepime as well       Past Medical History:   Diagnosis Date    Liver cirrhosis, alcoholic 09/30/2018     Past Surgical History:   Procedure Laterality Date    EGD (ESOPHAGOGASTRODUODENOSCOPY) N/A 11/6/2018    Performed by Duarte Jules MD at [facility] ([address] [place] [address])    ESOPHAGOGASTRODUODENOSCOPY N/A 11/6/2018    Procedure: EGD (ESOPHAGOGASTRODUODENOSCOPY);  Surgeon: Duarte Jules MD;  Location: [address]);  Service: Endoscopy;  Laterality: N/A;    EXPLORATORY LAPAROTOMY AFTER LIVER TRANSPLANTATION N/A 11/16/2018    Procedure: LAPAROTOMY, EXPLORATORY, AFTER LIVER TRANSPLANT;  Surgeon: Amadou Lester Jr., MD;  Location: [address];  Service: Transplant;  Laterality: N/A;    EXPLORATORY LAPAROTOMY AFTER LIVER TRANSPLANTATION N/A 11/18/2018    Procedure: LAPAROTOMY, EXPLORATORY, AFTER LIVER TRANSPLANT, LIKELY  ABDOMINAL CLOSURE;  Surgeon: Amadou Lester Jr., MD;  Location: [facility] OR [address] [place] [address];  Service: Transplant;  Laterality: N/A;    INSERTION OF TUNNELED CENTRAL VENOUS HEMODIALYSIS CATHETER N/A 11/7/2018    Procedure: INSERTION, CATHETER,  CENTRAL VENOUS, HEMODIALYSIS, TUNNELED;  Surgeon: Brock Gonzalez MD;  Location: [facility] CATH LAB;  Service: Nephrology;  Laterality: N/A;    INSERTION, CATHETER, CENTRAL VENOUS, HEMODIALYSIS, TUNNELED N/A 11/7/2018    Performed by Brock Gonzalez MD at [facility] CATH LAB    LAPAROTOMY, EXPLORATORY N/A 11/16/2018    Procedure: LAPAROTOMY, EXPLORATORY;  Surgeon: Amadou Lester Jr., MD;  Location: [facility] OR [address] [place] [address];  Service: Transplant;  Laterality: N/A;    LAPAROTOMY, EXPLORATORY N/A 11/16/2018    Performed by Amadou Lester Jr., MD at [facility] OR [address] [place] [address]    LAPAROTOMY, EXPLORATORY, AFTER LIVER TRANSPLANT N/A 11/16/2018    Performed by Amadou Lester Jr., MD at [facility] [place] [address] [place] [address]    LAPAROTOMY, EXPLORATORY, AFTER LIVER TRANSPLANT, LIKELY  ABDOMINAL CLOSURE N/A 11/18/2018    Performed by Amadou Lester Jr., MD at [facility] [place] [address] [place] [address]    LIVER TRANSPLANT N/A 11/11/2018    Procedure: TRANSPLANT, LIVER;  Surgeon: Shmuel Leary MD;  Location: [facility] OR [address] [place] [address];  Service: Transplant;  Laterality: N/A;    TRANSPLANT, LIVER N/A 11/11/2018    Performed by Shmuel Leary MD at [facility] [place] [address] [place] [address]     Family History     None        Tobacco Use    Smoking status: Never Smoker   Substance and Sexual Activity    Alcohol use: Not on file    Drug use: Not on file    Sexual activity: Not on file     Review of Systems   Constitutional: Positive for activity change and appetite change (improving).   HENT: Negative for congestion.  Eyes: Negative for pain, discharge and visual disturbance.   Respiratory: Negative for cough.  Cardiovascular:  Negative for chest pain, palpitations.  Gastrointestinal: Negative for abdominal pain, constipation, diarrhea, nausea and vomiting.   Genitourinary: Positive for decreased urine volume.   Musculoskeletal: Negative for back pain, gait problem.  Skin: Negative for color change and pallor.   Allergic/Immunologic: Positive for immunocompromised state.   Neurological: Positive for  weakness.   Psychiatric/Behavioral: Negative for behavioral problems, confusion, decreased concentration, dysphoric mood, hallucinations, sleep disturbance and suicidal ideas. The patient is not nervous/anxious.      Objective:     Vital Signs (Most Recent):  Temp: 98.1 °F (36.7 °C) (12/18/18 1207)  Pulse: 88 (12/18/18 1207)  Resp: 20 (12/18/18 1207)  BP: 117/76 (12/18/18 1207)  SpO2: 98 % (12/18/18 1207) Vital Signs (24h Range):  Temp:  [98 °F (36.7 °C)-98.7 °F (37.1 °C)] 98.1 °F (36.7 °C)  Pulse:  [88-96] 88  Resp:  [19-27] 20  SpO2:  [97 %-99 %] 98 %  BP: (112-134)/(76-87) 117/76     Weight: 70 kg (154 lb 5.2 oz)  Body mass index is 22.14 kg/m².    Physical Exam   Constitutional: He is oriented to person, place, and time. He appears cachectic. He appears ill. No distress.   HENT:   Head: Normocephalic and atraumatic.   Eyes: No scleral icterus.   Cardiovascular: Normal rate.   Pulmonary/Chest: No stridor. No respiratory distress.   Abdominal: Soft. He exhibits no distension. There is no tenderness.   Chevron incision healing well   Musculoskeletal: He exhibits no edema (trace bilateral pitting edema).   Neurological: He is alert and oriented to person, place, and time.   Skin: Skin is warm and dry. Capillary refill takes less than 2 seconds.     Significant Labs:  CBC:   Recent Labs   Lab 12/18/18  0640   WBC 9.75   RBC 3.07*   HGB 8.7*   HCT 28.7*   *   MCV 94   MCH 28.3   MCHC 30.3*     CMP:   Recent Labs   Lab 12/18/18  0640   GLU 88   CALCIUM 8.8   ALBUMIN 2.1*   PROT 5.5*      K 3.6   CO2 21*      BUN 12   CREATININE 2.2*   ALKPHOS 159*   ALT 11   AST 11   BILITOT 1.5*     Coagulation:   Recent Labs   Lab 12/18/18  0640   LABPROT 11.8   INR 1.1   All pertinent labs from the last 24 hours have been reviewed.    Significant Diagnostics:  I have reviewed and interpreted all pertinent imaging results/findings within the past 24 hours.

## 2018-12-18 NOTE — PLAN OF CARE
Problem: Physical Therapy Goal  Goal: Physical Therapy Goal  Goals to be met by: 2018     Patient will increase functional independence with mobility by performin. Supine to sit with Modified [place] -not met  2. Sit to stand transfer with [place] -not met  3. Bed to chair transfer with independence using no AD -not met  4. Gait  x150 feet with Supervision using LRAD. -not met  5. Lower extremity exercise program x20 reps per handout, with independence -not met         Outcome: Ongoing (interventions implemented as appropriate)  Goals reviewed and remain appropriate. Pt progressing towards goals.    Ramandeep Kumar, PT, DPT   2018  [phone]

## 2018-12-19 LAB
ALBUMIN SERPL BCP-MCNC: 1.9 G/DL
ALP SERPL-CCNC: 170 U/L
ALT SERPL W/O P-5'-P-CCNC: 11 U/L
ANION GAP SERPL CALC-SCNC: 11 MMOL/L
AST SERPL-CCNC: 13 U/L
BASOPHILS # BLD AUTO: 0.29 K/UL
BASOPHILS NFR BLD: 2.6 %
BILIRUB SERPL-MCNC: 1.4 MG/DL
BUN SERPL-MCNC: 15 MG/DL
CALCIUM SERPL-MCNC: 8.8 MG/DL
CHLORIDE SERPL-SCNC: 104 MMOL/L
CO2 SERPL-SCNC: 21 MMOL/L
CREAT SERPL-MCNC: 2.6 MG/DL
DIFFERENTIAL METHOD: ABNORMAL
EOSINOPHIL # BLD AUTO: 0.6 K/UL
EOSINOPHIL NFR BLD: 5.1 %
ERYTHROCYTE [DISTWIDTH] IN BLOOD BY AUTOMATED COUNT: 15.5 %
EST. GFR  (AFRICAN AMERICAN): 31.2 ML/MIN/1.73 M^2
EST. GFR  (NON AFRICAN AMERICAN): 26.9 ML/MIN/1.73 M^2
GLUCOSE SERPL-MCNC: 58 MG/DL
HCT VFR BLD AUTO: 29.3 %
HGB BLD-MCNC: 9.1 G/DL
IMM GRANULOCYTES # BLD AUTO: 0.26 K/UL
IMM GRANULOCYTES NFR BLD AUTO: 2.3 %
LYMPHOCYTES # BLD AUTO: 0.7 K/UL
LYMPHOCYTES NFR BLD: 6.3 %
MAGNESIUM SERPL-MCNC: 1.7 MG/DL
MCH RBC QN AUTO: 29.5 PG
MCHC RBC AUTO-ENTMCNC: 31.1 G/DL
MCV RBC AUTO: 95 FL
MONOCYTES # BLD AUTO: 1.4 K/UL
MONOCYTES NFR BLD: 12.9 %
NEUTROPHILS # BLD AUTO: 7.9 K/UL
NEUTROPHILS NFR BLD: 70.8 %
NRBC BLD-RTO: 0 /100 WBC
PHOSPHATE SERPL-MCNC: 2.8 MG/DL
PLATELET # BLD AUTO: 458 K/UL
PMV BLD AUTO: 10.8 FL
POCT GLUCOSE: 145 MG/DL (ref 70–110)
POCT GLUCOSE: 58 MG/DL (ref 70–110)
POCT GLUCOSE: 66 MG/DL (ref 70–110)
POCT GLUCOSE: 68 MG/DL (ref 70–110)
POCT GLUCOSE: 75 MG/DL (ref 70–110)
POCT GLUCOSE: 92 MG/DL (ref 70–110)
POTASSIUM SERPL-SCNC: 3.5 MMOL/L
PREALB SERPL-MCNC: 10 MG/DL
PROT SERPL-MCNC: 5.5 G/DL
RBC # BLD AUTO: 3.08 M/UL
SODIUM SERPL-SCNC: 136 MMOL/L
TACROLIMUS BLD-MCNC: 11 NG/ML
WBC # BLD AUTO: 11.2 K/UL

## 2018-12-19 PROCEDURE — 99900035 HC TECH TIME PER 15 MIN (STAT)

## 2018-12-19 PROCEDURE — 25000003 PHARM REV CODE 250: Performed by: STUDENT IN AN ORGANIZED HEALTH CARE EDUCATION/TRAINING PROGRAM

## 2018-12-19 PROCEDURE — 96372 THER/PROPH/DIAG INJ SC/IM: CPT

## 2018-12-19 PROCEDURE — 63600175 PHARM REV CODE 636 W HCPCS: Performed by: NURSE PRACTITIONER

## 2018-12-19 PROCEDURE — 99233 SBSQ HOSP IP/OBS HIGH 50: CPT | Mod: 24,,, | Performed by: PHYSICIAN ASSISTANT

## 2018-12-19 PROCEDURE — 97530 THERAPEUTIC ACTIVITIES: CPT

## 2018-12-19 PROCEDURE — 25000003 PHARM REV CODE 250: Performed by: NURSE PRACTITIONER

## 2018-12-19 PROCEDURE — 63600175 PHARM REV CODE 636 W HCPCS: Performed by: TRANSPLANT SURGERY

## 2018-12-19 PROCEDURE — 80197 ASSAY OF TACROLIMUS: CPT

## 2018-12-19 PROCEDURE — 97112 NEUROMUSCULAR REEDUCATION: CPT

## 2018-12-19 PROCEDURE — 94664 DEMO&/EVAL PT USE INHALER: CPT

## 2018-12-19 PROCEDURE — 84134 ASSAY OF PREALBUMIN: CPT

## 2018-12-19 PROCEDURE — 90935 HEMODIALYSIS ONE EVALUATION: CPT

## 2018-12-19 PROCEDURE — 93005 ELECTROCARDIOGRAM TRACING: CPT

## 2018-12-19 PROCEDURE — 84100 ASSAY OF PHOSPHORUS: CPT

## 2018-12-19 PROCEDURE — 93010 EKG 12-LEAD: ICD-10-PCS | Mod: ,,, | Performed by: INTERNAL MEDICINE

## 2018-12-19 PROCEDURE — 83735 ASSAY OF MAGNESIUM: CPT

## 2018-12-19 PROCEDURE — 20600001 HC STEP DOWN PRIVATE ROOM

## 2018-12-19 PROCEDURE — 80053 COMPREHEN METABOLIC PANEL: CPT

## 2018-12-19 PROCEDURE — 63600175 PHARM REV CODE 636 W HCPCS: Performed by: STUDENT IN AN ORGANIZED HEALTH CARE EDUCATION/TRAINING PROGRAM

## 2018-12-19 PROCEDURE — 63600175 PHARM REV CODE 636 W HCPCS: Mod: JG | Performed by: INTERNAL MEDICINE

## 2018-12-19 PROCEDURE — 93010 ELECTROCARDIOGRAM REPORT: CPT | Mod: ,,, | Performed by: INTERNAL MEDICINE

## 2018-12-19 PROCEDURE — 85025 COMPLETE CBC W/AUTO DIFF WBC: CPT

## 2018-12-19 PROCEDURE — 87040 BLOOD CULTURE FOR BACTERIA: CPT

## 2018-12-19 PROCEDURE — 25000003 PHARM REV CODE 250: Performed by: PHYSICIAN ASSISTANT

## 2018-12-19 PROCEDURE — 99233 PR SUBSEQUENT HOSPITAL CARE,LEVL III: ICD-10-PCS | Mod: 24,,, | Performed by: PHYSICIAN ASSISTANT

## 2018-12-19 RX ORDER — GLUCAGON 1 MG
1 KIT INJECTION
Status: DISCONTINUED | OUTPATIENT
Start: 2018-12-19 | End: 2019-01-08 | Stop reason: HOSPADM

## 2018-12-19 RX ORDER — ESCITALOPRAM OXALATE 10 MG/1
20 TABLET ORAL NIGHTLY
Status: DISCONTINUED | OUTPATIENT
Start: 2018-12-19 | End: 2018-12-25

## 2018-12-19 RX ORDER — TACROLIMUS 1 MG/1
2 CAPSULE ORAL 2 TIMES DAILY
Status: DISCONTINUED | OUTPATIENT
Start: 2018-12-19 | End: 2018-12-24

## 2018-12-19 RX ORDER — IBUPROFEN 200 MG
16 TABLET ORAL
Status: DISCONTINUED | OUTPATIENT
Start: 2018-12-19 | End: 2019-01-08 | Stop reason: HOSPADM

## 2018-12-19 RX ORDER — MIRTAZAPINE 7.5 MG/1
7.5 TABLET, FILM COATED ORAL NIGHTLY
Status: DISCONTINUED | OUTPATIENT
Start: 2018-12-19 | End: 2018-12-27

## 2018-12-19 RX ORDER — IBUPROFEN 200 MG
24 TABLET ORAL
Status: DISCONTINUED | OUTPATIENT
Start: 2018-12-19 | End: 2019-01-08 | Stop reason: HOSPADM

## 2018-12-19 RX ORDER — SODIUM CHLORIDE 9 MG/ML
INJECTION, SOLUTION INTRAVENOUS ONCE
Status: COMPLETED | OUTPATIENT
Start: 2018-12-19 | End: 2018-12-19

## 2018-12-19 RX ADMIN — SULFAMETHOXAZOLE AND TRIMETHOPRIM 1 TABLET: 400; 80 TABLET ORAL at 08:12

## 2018-12-19 RX ADMIN — HEPARIN SODIUM 1000 UNITS: 1000 INJECTION, SOLUTION INTRAVENOUS; SUBCUTANEOUS at 06:12

## 2018-12-19 RX ADMIN — TACROLIMUS 2 MG: 1 CAPSULE ORAL at 07:12

## 2018-12-19 RX ADMIN — PANTOPRAZOLE SODIUM 40 MG: 40 TABLET, DELAYED RELEASE ORAL at 08:12

## 2018-12-19 RX ADMIN — MORPHINE 100 MG: 10 SOLUTION ORAL at 09:12

## 2018-12-19 RX ADMIN — HEPARIN SODIUM 5000 UNITS: 5000 INJECTION, SOLUTION INTRAVENOUS; SUBCUTANEOUS at 08:12

## 2018-12-19 RX ADMIN — ASPIRIN 81 MG CHEWABLE TABLET 81 MG: 81 TABLET CHEWABLE at 08:12

## 2018-12-19 RX ADMIN — URSODIOL 300 MG: 300 CAPSULE ORAL at 08:12

## 2018-12-19 RX ADMIN — Medication 24 G: at 10:12

## 2018-12-19 RX ADMIN — MIDODRINE HYDROCHLORIDE 10 MG: 5 TABLET ORAL at 05:12

## 2018-12-19 RX ADMIN — TIZANIDINE 2 MG: 2 TABLET ORAL at 09:12

## 2018-12-19 RX ADMIN — RAMELTEON 8 MG: 8 TABLET, FILM COATED ORAL at 08:12

## 2018-12-19 RX ADMIN — ISAVUCONAZONIUM SULFATE 372 MG: 186 CAPSULE ORAL at 08:12

## 2018-12-19 RX ADMIN — PREDNISONE 2.5 MG: 2.5 TABLET ORAL at 08:12

## 2018-12-19 RX ADMIN — TACROLIMUS 3 MG: 1 CAPSULE ORAL at 08:12

## 2018-12-19 RX ADMIN — HEPARIN SODIUM 5000 UNITS: 5000 INJECTION, SOLUTION INTRAVENOUS; SUBCUTANEOUS at 02:12

## 2018-12-19 RX ADMIN — ONDANSETRON 4 MG: 2 INJECTION INTRAMUSCULAR; INTRAVENOUS at 11:12

## 2018-12-19 RX ADMIN — SODIUM CHLORIDE 300 ML: 0.9 INJECTION, SOLUTION INTRAVENOUS at 03:12

## 2018-12-19 RX ADMIN — HEPARIN SODIUM 5000 UNITS: 5000 INJECTION, SOLUTION INTRAVENOUS; SUBCUTANEOUS at 06:12

## 2018-12-19 RX ADMIN — LIDOCAINE 1 PATCH: 50 PATCH CUTANEOUS at 06:12

## 2018-12-19 RX ADMIN — ERYTHROPOIETIN 4200 UNITS: 10000 INJECTION, SOLUTION INTRAVENOUS; SUBCUTANEOUS at 05:12

## 2018-12-19 RX ADMIN — MIRTAZAPINE 7.5 MG: 7.5 TABLET ORAL at 08:12

## 2018-12-19 RX ADMIN — ESCITALOPRAM OXALATE 20 MG: 10 TABLET ORAL at 08:12

## 2018-12-19 NOTE — PLAN OF CARE
Problem: Physical Therapy Goal  Goal: Physical Therapy Goal  Goals to be met by: 2018     Patient will increase functional independence with mobility by performin. Supine to sit with Modified [place] -not met  2. Sit to stand transfer with [place] -not met  3. Bed to chair transfer with independence using no AD -not met  4. Gait  x150 feet with Supervision using LRAD. -not met  5. Lower extremity exercise program x20 reps per handout, with independence -not met          Outcome: Ongoing (interventions implemented as appropriate)  Goals reviewed and remain appropriate. Pt progressing towards goals.    Ramandeep Kumar, PT, DPT   2018  [phone]

## 2018-12-19 NOTE — PLAN OF CARE
Problem: Occupational Therapy Goal  Goal: Occupational Therapy Goal  Goals to be met by: 12/31/18 ( Goals revised: 12/18)    Patient will increase functional independence with ADLs by performing:    UE Dressing with Supervision.   LE Dressing with Minimal Assistance.( met with socks; continue with pants)  Grooming while standing at sink with Stand-by Assistance.  Toileting from toilet with Moderate Assistance for hygiene and clothing management.   Toilet transfer to toilet with moderate assistance.  Upper extremity  theraband exercise program x  15 reps  with independence. Met 12/9           Outcome: Ongoing (interventions implemented as appropriate)  Continue with POC.   Julia ladd OT  12/19/2018

## 2018-12-19 NOTE — PROGRESS NOTES
[facility] HEMODIALYSIS NOTE     Patient currently on hemodialysis for removal of uremic toxins and volume.    Patient seen and evaluated on hemodialysis, tolerating treatment, see HD flowsheet for vitals and assessments.      Ultrafiltration goal is 1-2 l as tolerated     Labs have been reviewed and the dialysate bath has been adjusted.     Assessment/Plan:     HD today for removal of uremic toxins and volume.

## 2018-12-19 NOTE — PT/OT/SLP PROGRESS
Physical Therapy Treatment    Patient Name:  Femi Enciso   MRN:  [id number]    Recommendations:     Discharge Recommendations:  nursing facility, skilled   Discharge Equipment Recommendations: (TBD at next level of care)   Barriers to discharge: Decreased caregiver support at current functional level    Assessment:     Femi Enciso is a 53 y.o. male admitted with a medical diagnosis of S/P liver transplant.  He presents with the following impairments/functional limitations:  weakness, gait instability, impaired endurance, impaired balance, impaired self care skills, impaired functional mobilty, decreased coordination, impaired cardiopulmonary response to activity, decreased safety awareness, pain, decreased lower extremity function. Completed stand pivot transfer to bedside chair with BLE weakness noted, requiring B knees blocked throughout and with difficulty advancing LE (L>R). Attempted to progress gait further, but reported dizziness and nausea during each standing trial and requested return to sit. Required max encouragement throughout session with decreased effort noted during mobility. Pt would continue to benefit from skilled acute PT in order to address current deficits and progress functional mobility.     Rehab Prognosis:  good; patient would benefit from acute skilled PT services to address these deficits and reach maximum level of function.      Recent Surgery: Procedure(s) (LRB):  LAPAROTOMY, EXPLORATORY, AFTER LIVER TRANSPLANT, LIKELY  ABDOMINAL CLOSURE (N/A) 31 Days Post-Op    Plan:     During this hospitalization, patient to be seen 4 x/week to address the above listed problems via gait training, therapeutic activities, therapeutic exercises, neuromuscular re-education  · Plan of Care Expires:  01/17/19   Plan of Care Reviewed with: patient, spouse, mother     Subjective     Communicated with RN prior to session.  Patient found supine upon PT entry to room, agreeable to treatment.      Chief Complaint:  "nausea  Patient comments/goals: "I need to sit." "I'm going to throw up."   Pain/Comfort:  · Pain Rating 1: (reported L flank pain; did not rate)  · Pain Addressed 1: Reposition, Distraction    Patients cultural, spiritual, Baptism conflicts given the current situation: none noted      Objective:     Patient found with: telemetry, pulse ox (continuous)     General Precautions: Standard, fall   Orthopedic Precautions:N/A   Braces: N/A     Functional Mobility:  · Bed Mobility:     · Supine to Sit: minimum assistance  · Transfers:     · Sit to Stand:  x1 rep with minAx2 from EOB; x4 reps with modAx2 from bedside chair    § Cues for transfer technique, proper hand and foot placement, and use of forward momentum to assist with ease of transfer  · Bed to Chair: moderate assistance and of 2 persons with  hand-held assist  using  Stand Pivot  § B knees blocked throughout to prevent buckling/maintain knee ext  § Cues throughout for sequencing of steps and forward progression  § Decreased toe-floor clearance BLE, decreased step length (L>R), increased difficulty advancing LE (L>R)  · Gait: able to take small steps during stand pivot transfer bed>chair (see above); brought to hallway via chair for further gait training but pt unable to complete 2* dizziness and nausea  · Balance: static standing: modAx2 x4 reps  · B knees blocked throughout to prevent buckling and facilitate increased knee ext  · Max v/c and t/c for increased knee ext, increased hip ext and glute activation, upright posture, breathing technique, postural control      AM-PAC 6 CLICK MOBILITY  Turning over in bed (including adjusting bedclothes, sheets and blankets)?: 3  Sitting down on and standing up from a chair with arms (e.g., wheelchair, bedside commode, etc.): 2  Moving from lying on back to sitting on the side of the bed?: 3  Moving to and from a bed to a chair (including a wheelchair)?: 2  Need to walk in hospital room?: 2  Climbing 3-5 steps with a " railing?: 1  Basic Mobility Total Score: 13       Therapeutic Activities and Exercises:   Max encouragement provided throughout session but noted decreased effort and fluctuating participation   Performed static standing x4 trials with attempts to perform gait, but pt requesting return to sit each trials 2* dizziness and nausea   Extended seated rest breaks between standing trials with pt reporting SOB, dizziness, and nausea    Patient left up in chair with all lines intact, call button in reach and pt's wife and mother present..    GOALS:   Multidisciplinary Problems     Physical Therapy Goals        Problem: Physical Therapy Goal    Goal Priority Disciplines Outcome Goal Variances Interventions   Physical Therapy Goal     PT, PT/OT Ongoing (interventions implemented as appropriate)     Description:  Goals to be met by: 2018     Patient will increase functional independence with mobility by performin. Supine to sit with Modified [place] -not met  2. Sit to stand transfer with [place] -not met  3. Bed to chair transfer with independence using no AD -not met  4. Gait  x150 feet with Supervision using LRAD. -not met  5. Lower extremity exercise program x20 reps per handout, with independence -not met                     Multidisciplinary Problems (Resolved)        Problem: Physical Therapy Goal    Goal Priority Disciplines Outcome Goal Variances Interventions   Physical Therapy Goal   (Resolved)     PT, PT/OT Resolved for Upgrade                     Time Tracking:     PT Received On: 18  PT Start Time: 1033     PT Stop Time: 1106  PT Total Time (min): 33 min     Billable Minutes: Neuromuscular Re-education 33   (co-tx with OT)     Treatment Type: Treatment  PT/PTA: PT     PTA Visit Number: 0     Ramandeep Kumar, PT, DPT   2018  [phone]

## 2018-12-19 NOTE — ASSESSMENT & PLAN NOTE
- LFTs now improving slowly.  T bili was 37.6 day of transplant.  - Drain placed during take back. Drain placed to intra-abdominal abscess on 11/22.   - Fluid from collection noted with enterococcus VRE completed Zyvox treatment.  -Routine month 1 Liver US obtained 12/17 which showed elevated velocity of hepatic artery and low RIs.   - Vascular Surgery consulted. Recommend repeat US in 10-14 days to reassess. Appreciate Vascular assistance.

## 2018-12-19 NOTE — SUBJECTIVE & OBJECTIVE
Scheduled Meds:   sodium chloride 0.9%   Intravenous Once    aspirin  81 mg Oral Daily    epoetin sherlyn (PROCRIT) injection  50 Units/kg (Dosing Weight) Intravenous Every Mon, Wed, Fri    ergocalciferol  50,000 Units Oral Q7 Days    escitalopram oxalate  20 mg Oral Nightly    heparin (porcine)  5,000 Units Subcutaneous Q8H    isavuconazonium sulfate  372 mg Oral Daily    lidocaine  1 patch Transdermal Q24H    mirtazapine  7.5 mg Oral QHS    pantoprazole  40 mg Oral Daily    predniSONE  2.5 mg Oral Daily    sulfamethoxazole-trimethoprim 400-80mg  1 tablet Oral Every Mon, Wed, Fri    tacrolimus  3 mg Oral BID    traZODone  50 mg Oral QHS    ursodiol  300 mg Oral BID    valganciclovir 50 mg/ml  100 mg Oral Once per day on Mon Wed Fri     Continuous Infusions:  PRN Meds:acetaminophen, albuterol-ipratropium, artificial tears, bacitracin, bisacodyl, dextrose 50%, dextrose 50%, glucagon (human recombinant), glucose, glucose, heparin (porcine), midodrine, ondansetron, ramelteon, tiZANidine, traMADol, traZODone    Review of Systems   Constitutional: Positive for activity change and appetite change (decreased). Negative for fatigue and fever.   HENT: Negative for congestion, facial swelling and voice change.    Eyes: Negative for pain, discharge and visual disturbance.   Respiratory: Negative for apnea, cough, choking, chest tightness, shortness of breath and wheezing.    Cardiovascular: Negative.  Negative for chest pain, palpitations and leg swelling.   Gastrointestinal: Negative for abdominal distention, abdominal pain, constipation, diarrhea, nausea and vomiting.   Endocrine: Negative.    Genitourinary: Positive for decreased urine volume. Negative for difficulty urinating, dysuria, hematuria and urgency.   Musculoskeletal: Negative.  Negative for back pain, gait problem, neck pain and neck stiffness.   Skin: Positive for wound. Negative for color change and pallor.   Allergic/Immunologic: Positive for  immunocompromised state.   Neurological: Positive for weakness. Negative for dizziness, seizures, light-headedness and headaches.   Psychiatric/Behavioral: Negative for behavioral problems, confusion, decreased concentration, dysphoric mood, hallucinations, sleep disturbance and suicidal ideas. The patient is not nervous/anxious.      Objective:     Vital Signs (Most Recent):  Temp: 97.7 °F (36.5 °C) (12/19/18 1154)  Pulse: 95 (12/19/18 1200)  Resp: (!) 24 (12/19/18 1200)  BP: (!) 158/97 (12/19/18 1200)  SpO2: 99 % (12/19/18 1200) Vital Signs (24h Range):  Temp:  [97.7 °F (36.5 °C)-98.7 °F (37.1 °C)] 97.7 °F (36.5 °C)  Pulse:  [84-95] 95  Resp:  [20-25] 24  SpO2:  [98 %-99 %] 99 %  BP: (119-177)/() 158/97     Weight: 70 kg (154 lb 5.2 oz)  Body mass index is 22.14 kg/m².    Intake/Output - Last 3 Shifts       12/17 0700 - 12/18 0659 12/18 0700 - 12/19 0659 12/19 0700 - 12/20 0659    P.O. 780 200 100    I.V. (mL/kg)       Other 600      Total Intake(mL/kg) 1380 (19.7) 200 (2.9) 100 (1.4)    Urine (mL/kg/hr)       Emesis/NG output   100    Other 2468      Stool       Total Output 2468  100    Net -1088 +200 0           Urine Occurrence 0 x 0 x     Stool Occurrence 0 x 0 x           Physical Exam   Constitutional: He is oriented to person, place, and time. He appears well-developed. No distress.   Temporal and distal extremity muscle wasting noted.   HENT:   Head: Normocephalic and atraumatic.   Eyes: Pupils are equal, round, and reactive to light.   Neck: Normal range of motion. Neck supple. No JVD present.   Cardiovascular: Normal rate, regular rhythm and normal heart sounds.   No murmur heard.  Pulmonary/Chest: Effort normal. No respiratory distress. He has decreased breath sounds in the right lower field and the left lower field. He has no wheezes. He exhibits no tenderness.   Abdominal: Soft. Bowel sounds are normal. He exhibits no distension. There is no tenderness.   Chevron inc clean, dry, intact with  steri strips in place     Musculoskeletal: Normal range of motion. He exhibits no tenderness. Edema: trace ankle edema.   Neurological: He is alert and oriented to person, place, and time. He has normal reflexes.   Skin: Skin is warm and dry. Capillary refill takes 2 to 3 seconds. He is not diaphoretic. No pallor.   Psychiatric: He has a normal mood and affect. His behavior is normal. Judgment and thought content normal. His affect is not labile. He is not slowed. Cognition and memory are not impaired.   Nursing note and vitals reviewed.      Laboratory:  Immunosuppressants         Stop Route Frequency     tacrolimus capsule 3 mg      -- Oral 2 times daily        CBC:   Recent Labs   Lab 12/19/18  0729   WBC 11.20   RBC 3.08*   HGB 9.1*   HCT 29.3*   *   MCV 95   MCH 29.5   MCHC 31.1*     CMP:   Recent Labs   Lab 12/19/18  0729   GLU 58*   CALCIUM 8.8   ALBUMIN 1.9*   PROT 5.5*      K 3.5   CO2 21*      BUN 15   CREATININE 2.6*   ALKPHOS 170*   ALT 11   AST 13   BILITOT 1.4*     Labs within the past 24 hours have been reviewed.    Diagnostic Results:  I have personally reviewed all pertinent imaging studies.

## 2018-12-19 NOTE — PROGRESS NOTES
VITALIY spoke with CONCETTA Toro RN CM for transplant today, informing SW that patient does not have SNF benefits.  VITALIY spoke with pts ins Laurie TORIBIO at [facility] ([phone]) who was able to confirm that pts [employer] employee policy does not include SNF benefits.   It does include inpatient rehab benefits.   VITALIY spoke with GISELL Christine liaison with [facility], they now have a contract in place with [facility] and can accept pts with inpatient rehab benefits insured by [facility] in network.   VITALIY verbalized understanding.   VITALIY communicated this information with pts mid-level provider Manda Jackson this afternoon.   The team has now consulted inpatient rehab at [facility] for placement.    VITALIY did confirm with Bennie at [facility] ([phone]) that pt has a MWF Chair time of 4:15 and could start this Friday.   VITALIY will assess dc plans tomorrow with team and will call [facility] and [facility] back indicating pt not ready for dc at this time.   VITALIY remains available for all transplant resources, education and psychosocial support and assist with dc planning needs.

## 2018-12-19 NOTE — PLAN OF CARE
Discharge Planning:    --Spoke with Angela at [facility]. There are clinical concerns regarding the patient may need Long-Term (NH) SNF placement.   Also, Angela stated [facility] patient do NOT usually have SNF benefits. She suggested the patient or patient representative should call the # on the back of his insurance card to enroll in Case Management program, so SNF can send a referral if Dr. Baum agrees that he is  -SNF appropriate.       --Also placed PMR consult and [employer] consult to [facility], it is my understanding that this facility now has a contract with [facility].    (discuss the above with ALISON Holman and VITALIY Romo).

## 2018-12-19 NOTE — PROGRESS NOTES
[facility]  Liver Transplant  Progress Note    Patient Name: Femi Enciso  MRN: [id number]  Admission Date: 10/27/2018  Hospital Length of Stay: 53 days  Code Status: Full Code  Primary Care Provider: Primary Doctor No  Post-Operative Day: 38    ORGAN:   LIVER  Disease Etiology: Alcoholic Cirrhosis  Donor Type:    - Brain Death  CDC High Risk:   No  Donor CMV Status:   Donor CMV Status: Positive  Donor HBcAB:   Negative  Donor HCV Status:   Negative  Donor HBV JAVIER: Negative  Donor HCV JAVIER: Negative  Whole or Partial: Whole Liver  Biliary Anastomosis: End to End  Arterial Anatomy: Standard  Subjective:     History of Present Illness:  Femi Enciso is a 53yr old male with PMH of acute ETOH hepatitis and DEL/ATN evolved to ESRD requiring HD (not candidate for KTX). He is now s/p liver transplant (SM induction, DBD, CMV D+/R-). Transplant surgery noted severe collateralization, adrenal gland firmly adhered to liver. Bare area of adrenal gland required several stitches and packing to obtain fair hemostasis (EBL 15L). OR take back  for bleeding from R adrenal bed in AM (significant clot in retroperitoneum, posterior to R hepatic lobe, tract posterior to the R kidney containing significant clot, small bleeding area of retroperitoneal fat) then returned to surgery same day for hemorrhaging closed with wound vac with plans to return to OR 24-48 hours for closure (retroperitoneal /retrorenal/retrocolic hematoma on the right ). Raw retroperitoneal bleeding. Suture ligatures placed, argon beam cautery, evarest placed in retroperitoneum behind cava and right kidney. Significant oozing from upper right adrenal gland, not amenable to ligation, that portion of the adrenal gland was resected with cautery). OR take back  for washout and incisional closure, no significant bleeding or hematoma found. OR cultures   from body fluid grew enterococcus faecium VRE. ID was consulted,  started on  "daptomycin for VRE treatment and cefepime/flagyl to cover for IA ty in bile. Patient with significant leukocytosis with peak on 11/22 at 48K prompting drainage of R subphrenic fluid collection, perc drain placed, culture grew VRE. Stroke code called 11/21 for lethargy and unresponsive, CT head without evidence of acute ischemic changes and CTA chest negative, vascular neurology was consulted recommended MRI brain, but patient's AMS improved shortly after event. Nephrology consulted for ESRD requiring HD. Patient overall improved on antibiotic regimen, leukocytosis and AMS improved. He was transferred to TSU on POD #15.     Hospital Course:  Interval History:  Patient with assisted fall yesterday afternoon. Patient was ambulating with nurse when his "legs gave out" and he was assisted to the floor. No obvious signs or trauma on exam. Today, patient is feeling okay. Nutrition has been poor over the past 24 hours. Pre albumin ordered. Patient refusing boost supplements. Discussed at length with patient and caregivers that if patient's nutrition status does not improve over the next 24-48 hours, will need supplemental nutrition via tube feeds or TPN. Will start low dose Remeron for appetite stimulant. Continue aggressive PT/OT.       Scheduled Meds:   sodium chloride 0.9%   Intravenous Once    aspirin  81 mg Oral Daily    epoetin sherlyn (PROCRIT) injection  50 Units/kg (Dosing Weight) Intravenous Every Mon, Wed, Fri    ergocalciferol  50,000 Units Oral Q7 Days    escitalopram oxalate  20 mg Oral Nightly    heparin (porcine)  5,000 Units Subcutaneous Q8H    isavuconazonium sulfate  372 mg Oral Daily    lidocaine  1 patch Transdermal Q24H    mirtazapine  7.5 mg Oral QHS    pantoprazole  40 mg Oral Daily    predniSONE  2.5 mg Oral Daily    sulfamethoxazole-trimethoprim 400-80mg  1 tablet Oral Every Mon, Wed, Fri    tacrolimus  3 mg Oral BID    traZODone  50 mg Oral QHS    ursodiol  300 mg Oral BID    " valganciclovir 50 mg/ml  100 mg Oral Once per day on Mon Wed Fri     Continuous Infusions:  PRN Meds:acetaminophen, albuterol-ipratropium, artificial tears, bacitracin, bisacodyl, dextrose 50%, dextrose 50%, glucagon (human recombinant), glucose, glucose, heparin (porcine), midodrine, ondansetron, ramelteon, tiZANidine, traMADol, traZODone    Review of Systems   Constitutional: Positive for activity change and appetite change (decreased). Negative for fatigue and fever.   HENT: Negative for congestion, facial swelling and voice change.    Eyes: Negative for pain, discharge and visual disturbance.   Respiratory: Negative for apnea, cough, choking, chest tightness, shortness of breath and wheezing.    Cardiovascular: Negative.  Negative for chest pain, palpitations and leg swelling.   Gastrointestinal: Negative for abdominal distention, abdominal pain, constipation, diarrhea, nausea and vomiting.   Endocrine: Negative.    Genitourinary: Positive for decreased urine volume. Negative for difficulty urinating, dysuria, hematuria and urgency.   Musculoskeletal: Negative.  Negative for back pain, gait problem, neck pain and neck stiffness.   Skin: Positive for wound. Negative for color change and pallor.   Allergic/Immunologic: Positive for immunocompromised state.   Neurological: Positive for weakness. Negative for dizziness, seizures, light-headedness and headaches.   Psychiatric/Behavioral: Negative for behavioral problems, confusion, decreased concentration, dysphoric mood, hallucinations, sleep disturbance and suicidal ideas. The patient is not nervous/anxious.      Objective:     Vital Signs (Most Recent):  Temp: 97.7 °F (36.5 °C) (12/19/18 1154)  Pulse: 95 (12/19/18 1200)  Resp: (!) 24 (12/19/18 1200)  BP: (!) 158/97 (12/19/18 1200)  SpO2: 99 % (12/19/18 1200) Vital Signs (24h Range):  Temp:  [97.7 °F (36.5 °C)-98.7 °F (37.1 °C)] 97.7 °F (36.5 °C)  Pulse:  [84-95] 95  Resp:  [20-25] 24  SpO2:  [98 %-99 %] 99 %  BP:  (119-177)/() 158/97     Weight: 70 kg (154 lb 5.2 oz)  Body mass index is 22.14 kg/m².    Intake/Output - Last 3 Shifts       12/17 0700 - 12/18 0659 12/18 0700 - 12/19 0659 12/19 0700 - 12/20 0659    P.O. 780 200 100    I.V. (mL/kg)       Other 600      Total Intake(mL/kg) 1380 (19.7) 200 (2.9) 100 (1.4)    Urine (mL/kg/hr)       Emesis/NG output   100    Other 2468      Stool       Total Output 2468  100    Net -1088 +200 0           Urine Occurrence 0 x 0 x     Stool Occurrence 0 x 0 x           Physical Exam   Constitutional: He is oriented to person, place, and time. He appears well-developed. No distress.   Temporal and distal extremity muscle wasting noted.   HENT:   Head: Normocephalic and atraumatic.   Eyes: Pupils are equal, round, and reactive to light.   Neck: Normal range of motion. Neck supple. No JVD present.   Cardiovascular: Normal rate, regular rhythm and normal heart sounds.   No murmur heard.  Pulmonary/Chest: Effort normal. No respiratory distress. He has decreased breath sounds in the right lower field and the left lower field. He has no wheezes. He exhibits no tenderness.   Abdominal: Soft. Bowel sounds are normal. He exhibits no distension. There is no tenderness.   Chevron inc clean, dry, intact with steri strips in place     Musculoskeletal: Normal range of motion. He exhibits no tenderness. Edema: trace ankle edema.   Neurological: He is alert and oriented to person, place, and time. He has normal reflexes.   Skin: Skin is warm and dry. Capillary refill takes 2 to 3 seconds. He is not diaphoretic. No pallor.   Psychiatric: He has a normal mood and affect. His behavior is normal. Judgment and thought content normal. His affect is not labile. He is not slowed. Cognition and memory are not impaired.   Nursing note and vitals reviewed.      Laboratory:  Immunosuppressants         Stop Route Frequency     tacrolimus capsule 3 mg      -- Oral 2 times daily        CBC:   Recent Labs   Lab  12/19/18  0729   WBC 11.20   RBC 3.08*   HGB 9.1*   HCT 29.3*   *   MCV 95   MCH 29.5   MCHC 31.1*     CMP:   Recent Labs   Lab 12/19/18  0729   GLU 58*   CALCIUM 8.8   ALBUMIN 1.9*   PROT 5.5*      K 3.5   CO2 21*      BUN 15   CREATININE 2.6*   ALKPHOS 170*   ALT 11   AST 13   BILITOT 1.4*     Labs within the past 24 hours have been reviewed.    Diagnostic Results:  I have personally reviewed all pertinent imaging studies.    Assessment/Plan:     * S/P liver transplant    - LFTs now improving slowly.  T bili was 37.6 day of transplant.  - Drain placed during take back. Drain placed to intra-abdominal abscess on 11/22.   - Fluid from collection noted with enterococcus VRE completed Zyvox treatment.  -Routine month 1 Liver US obtained 12/17 which showed elevated velocity of hepatic artery and low RIs.   - Vascular Surgery consulted. Recommend repeat US in 10-14 days to reassess. Appreciate Vascular assistance.      Hypomagnesemia    - Improved. Continue to monitor.      Severe malnutrition    Dietician following. Severe temporal, clavicle muscle depletion noted. Severe subq fat loss  -Nutrition has been poor over the past 24 hours.   -Pre albumin ordered.   -Discussed at length with patient and caregivers that if patient's nutrition status does not improve over the next 24-48 hours, will need supplemental nutrition via tube feeds or TPN.  -Will start low dose Remeron for appetite stimulant.       Anxiety    - Restarted Lexapro as per psych recs, 12/13.        Acute renal failure with acute tubular necrosis superimposed on stage 3 chronic kidney disease    - 2 weeks for DEL prior to transplant  - Renal function slow to recover post-op.   - Nephrology consulted for management.   - Cont with HD as per nephrology recs.  Apprec recs.    - Lasix 160 mg challenge 11/28 w/ minimal output.    - HD MWF.   - Strict I&Os.      Intra-abdominal abscess    - Significant increase in WBC post-op.   - OR cultures  from 11/18 noted with enterococcus VRE.   - Abdominal CT performed at that time with fluid collection and drain placed on 11/22 for drainage.   - Intra-abdominal abscess culture also with enterococcus VRE.   - ID consulted and pt placed on antibxs for treatment. Pt was on Cefepime, Daptomycin, and Fluconazole for treatment.    - Pt remains with RLQ drains (one JOSE A and one Percutaneous).  One JOSE A removed 11/28.  Perc drain in placed draining tan, clear drainage.  WBC slowly improving.   - Liver US on 11/26 reviewed.   - Abdominal CT performed 11/27 and reviewed. Fluid collection noted with improvement on CT.  ID following and reassured by findings.    - Dapto, Cefepime and Flagyl changed 11/30/18 to Linezolid 600 mg PO q 12 hr x 10 days per ID.     - added back cefepime after thora.  ID re consulted.  - Completed Zyvox x 10 days per ID thru 12/9/18. Monitor.      Long-term use of immunosuppressant medication    - Cont with prograf. Draw prograf level daily and adjust dose as needed to maintain a therapeutic level.        At risk for opportunistic infections    - Cont with bactrim and valcyte for protection against opportunistic infections.   - cont Isavuconazonium.     Adrenal cortical steroids causing adverse effect in therapeutic use           Prophylactic immunotherapy    - See long term immunosuppression.        Debility    - Pt remains significantly debilitated.   - PT/OT for strengthening.   - Currently will need SNF for placement and further rehabilitation.   - Pt remains severely debilitated and not strong enough for SNF at this time.    - Cont with strengthening  -SNF consult placed 12/17.        Pleural effusion associated with hepatic disorder    - c/o increased pain w deep breath today to right side.  CXR showed increased opacity in the inferior hemothorax on the right side since 11/26/2018.  Pulse ox 97-99% on RA.  Cont to monitor.   - continues to have fluid to RLL.  WBC remains elevated.    -  thoracentesis performed on 11/30. Fluid noted with 4k WBC, 93 SEGS. cx no growth to date.  Cefepime added for treatment.  - Chest CT showing right pleural effusion improved, left w increased pleural effusion.   - Per ID, completed treatment of empyema w Cefepime thru 12/8.  - sats remain 97-99% on RA.     Type 2 diabetes mellitus without complication    - Endocrine consulted for management. Appreciate recs.   - hypoglycemia 12/10 requiring D50.    - repeat cx 12/11.       Anemia of chronic disease    - H&H stable. Monitor with daily labs.   - Chest/Abd/pelvis CT 12/4- no fluid to be drainged per transplant surgeon.  No source of bleeding.     - last liver US 11/27. Evolving peritransplant hematomas with anechoic fluid collection adjacent to the right hepatic lobe.  Small volume perihepatic free fluid.         VTE Risk Mitigation (From admission, onward)        Ordered     heparin (porcine) injection 1,000 Units  As needed (PRN)      12/12/18 1731     heparin (porcine) injection 5,000 Units  Every 8 hours      11/30/18 1149     IP VTE HIGH RISK PATIENT  Once      11/11/18 1208          The patients clinical status was discussed at multidisplinary rounds, involving transplant surgery, transplant medicine, pharmacy, nursing, nutrition, and social work    Discharge Planning: Not a candidate for discharge at this time.   No Patient Care Coordination Note on file.      Manda Jackson PA-C  Liver Transplant  [facility]

## 2018-12-19 NOTE — PROGRESS NOTES
Pt arrived to unit via stretcher. Pt alert & stable. Acute pt. Accessed RT IJ, lumens patent, duration 3.0hrs, Qb 400ml/min, Qd 800ml/min, orders reviewed.

## 2018-12-19 NOTE — ASSESSMENT & PLAN NOTE
Dietician following. Severe temporal, clavicle muscle depletion noted. Severe subq fat loss  -Nutrition has been poor over the past 24 hours.   -Pre albumin ordered.   -Discussed at length with patient and caregivers that if patient's nutrition status does not improve over the next 24-48 hours, will need supplemental nutrition via tube feeds or TPN.  -Will start low dose Remeron for appetite stimulant.

## 2018-12-19 NOTE — PLAN OF CARE
Problem: Patient Care Overview  Goal: Plan of Care Review  Outcome: Ongoing (interventions implemented as appropriate)  Pt is AAOx3. Pt c/o pian to left side reports when he fell yesterday he twisted funny and now that area hurts. No breakdown noted, po fluids encouraged.Standard precautions maintained.  Pt turns independently, pt is aware of bony area and pressure reduction positions V/S stable, within normal limits.Pt remains injury and fall free, non skid footwear donned, call light within reach, personal items within reach, bed in low/locked position, pt able to voice needs all needs voiced have been met at this time.

## 2018-12-19 NOTE — ASSESSMENT & PLAN NOTE
- Pt remains significantly debilitated.   - PT/OT for strengthening.   - Currently will need SNF for placement and further rehabilitation.   - Pt remains severely debilitated and not strong enough for SNF at this time.    - Cont with strengthening  -SNF consult placed 12/17.

## 2018-12-19 NOTE — NURSING
Patient left unit with transport at 1500 to go to hemodialysis. AAOx4, ViSi remains in patient's room.

## 2018-12-19 NOTE — ASSESSMENT & PLAN NOTE
- 2 weeks for DEL prior to transplant  - Renal function slow to recover post-op.   - Nephrology consulted for management.   - Cont with HD as per nephrology recs.  Apprec recs.    - Lasix 160 mg challenge 11/28 w/ minimal output.    - HD MWF.   - Strict I&Os.

## 2018-12-19 NOTE — PLAN OF CARE
Problem: Patient Care Overview  Goal: Plan of Care Review  Patient is AAOx4, c/o pain to right ribs. Chest X-rays neg for injury. X-ray of knee cxld. C/o nausea with 1 episode of vomiting, 100 ml total emesis, gave IV zofran. No further complaints. Hypoglycemia in am, administered glucose tablets and rechecked.  Resume BG checks AC/HS.  New orders for Rozerem and Remeron to start tonight. Trazadone DCd.  Lexapro increased to 30 mg. Prograf decreased to 2 mg BID. Blue card updated. Patient went to dialysis at 1500.

## 2018-12-19 NOTE — PT/OT/SLP PROGRESS
Occupational Therapy   Treatment    Name: Femi Enciso  MRN: [id number]  Admitting Diagnosis:  S/P liver transplant  31 Days Post-Op    Recommendations:     Discharge Recommendations: nursing facility, skilled  Discharge Equipment Recommendations:  wheelchair, manual(bedside commode, bath bench)  Barriers to discharge:  None    Subjective     Pain/Comfort:  · Pain Rating 1: 0/10(Complaints of nausea)  · Pain Rating Post-Intervention 1: 0/10    Objective:     Communicated with: RN prior to session.  Patient found with all lines intact, call button in reach and Rn notified and telemetry, pulse ox (continuous) upon OT entry to room.    General Precautions: Standard, fall   Orthopedic Precautions:N/A   Braces: N/A     Occupational Performance:    Bed Mobility:    · Patient completed Rolling/Turning to Right with stand by assistance  · Patient completed Supine to Sit with minimum assistance     Functional Mobility/Transfers:  · Patient completed Sit <> Stand Transfer with minimum assistance and of 2 persons  with  no assistive device from EOB ( x1 trial); mod x2 from bedside chair ( x4 trials)  · Patient completed Bed <> Chair Transfer taking ~6-7 steps with moderate assistancex2 with hand-held assist ( Pt required blocking of B knees to prevent buckling)   · Functional Mobility: Attempted functional mobility in hallway however limited 2/2 complaints of nausea and dizziness. x4 attempts with minimal steps taken requiring significant seated rest break between each trial     Activities of Daily Living:  · Feeding:  independence to drink from cup while seated UIC  · Upper Body Dressing: minimum assistance to milady gown like jacket while seated EOB    AMPAC 6 Click ADL: 17    Treatment & Education:  -Pt edu on OT role/POC  -Importance of OOB activity with staff assistance ( UIC throughout the day)  -Safety during functional t/f and mobility  - White board updated  - Educated on the importance of nutrition and the benefit protein  has on overall body function    Patient left up in chair with all lines intact, call button in reach and Rn notified  Education:    Assessment:     Femi Enciso is a 53 y.o. male with a medical diagnosis of S/P liver transplant. Pt alert and motivated to participate in therapy session. He presents with the following performance deficits affecting function are weakness, impaired endurance, impaired functional mobilty, gait instability, impaired self care skills, impaired balance, decreased lower extremity function, impaired cognition, decreased safety awareness. Pt requires mod x2 with functional mobility however limited 2/2 complaints of nausea and dizziness. He would benefit from SS SNF following d/c to continue to progress towards goals and improve quality of life.     Rehab Prognosis:  Good; patient would benefit from acute skilled OT services to address these deficits and reach maximum level of function.       Plan:     Patient to be seen 4 x/week to address the above listed problems via self-care/home management, therapeutic activities, therapeutic exercises, neuromuscular re-education  · Plan of Care Expires: 12/21/18  · Plan of Care Reviewed with: patient, spouse, mother    This Plan of care has been discussed with the patient who was involved in its development and understands and is in agreement with the identified goals and treatment plan    GOALS:   Multidisciplinary Problems     Occupational Therapy Goals        Problem: Occupational Therapy Goal    Goal Priority Disciplines Outcome Interventions   Occupational Therapy Goal     OT, PT/OT Ongoing (interventions implemented as appropriate)    Description:  Goals to be met by: 12/31/18 ( Goals revised: 12/18)    Patient will increase functional independence with ADLs by performing:    UE Dressing with Supervision.   LE Dressing with Minimal Assistance.( met with socks; continue with pants)  Grooming while standing at sink with Stand-by Assistance.  Toileting  from toilet with Moderate Assistance for hygiene and clothing management.   Toilet transfer to toilet with moderate assistance.  Upper extremity  theraband exercise program x  15 reps  with independence. Met 12/9                       Multidisciplinary Problems (Resolved)        Problem: Occupational Therapy Goal    Goal Priority Disciplines Outcome Interventions   Occupational Therapy Goal   (Resolved)     OT, PT/OT Resolved for Upgrade                    Time Tracking:     OT Date of Treatment: 12/19/18  OT Start Time: 1033  OT Stop Time: 1104  OT Total Time (min): 31 min    Billable Minutes:Therapeutic Activity 31    Julia ladd OT  12/19/2018

## 2018-12-20 LAB
ALBUMIN SERPL BCP-MCNC: 2 G/DL
ALP SERPL-CCNC: 167 U/L
ALT SERPL W/O P-5'-P-CCNC: 13 U/L
ANION GAP SERPL CALC-SCNC: 10 MMOL/L
AST SERPL-CCNC: 12 U/L
BASOPHILS # BLD AUTO: 0.39 K/UL
BASOPHILS NFR BLD: 3 %
BILIRUB SERPL-MCNC: 1.4 MG/DL
BUN SERPL-MCNC: 8 MG/DL
CALCIUM SERPL-MCNC: 8.6 MG/DL
CHLORIDE SERPL-SCNC: 107 MMOL/L
CO2 SERPL-SCNC: 23 MMOL/L
CREAT SERPL-MCNC: 2 MG/DL
DIFFERENTIAL METHOD: ABNORMAL
EOSINOPHIL # BLD AUTO: 0.7 K/UL
EOSINOPHIL NFR BLD: 5.2 %
ERYTHROCYTE [DISTWIDTH] IN BLOOD BY AUTOMATED COUNT: 15.8 %
EST. GFR  (AFRICAN AMERICAN): 42.8 ML/MIN/1.73 M^2
EST. GFR  (NON AFRICAN AMERICAN): 37 ML/MIN/1.73 M^2
GLUCOSE SERPL-MCNC: 83 MG/DL
HCT VFR BLD AUTO: 30.7 %
HGB BLD-MCNC: 9.3 G/DL
IMM GRANULOCYTES # BLD AUTO: 0.47 K/UL
IMM GRANULOCYTES NFR BLD AUTO: 3.6 %
LYMPHOCYTES # BLD AUTO: 1.2 K/UL
LYMPHOCYTES NFR BLD: 8.9 %
MAGNESIUM SERPL-MCNC: 1.6 MG/DL
MCH RBC QN AUTO: 29.2 PG
MCHC RBC AUTO-ENTMCNC: 30.3 G/DL
MCV RBC AUTO: 97 FL
MONOCYTES # BLD AUTO: 2 K/UL
MONOCYTES NFR BLD: 15.5 %
NEUTROPHILS # BLD AUTO: 8.4 K/UL
NEUTROPHILS NFR BLD: 63.8 %
NRBC BLD-RTO: 0 /100 WBC
PHOSPHATE SERPL-MCNC: 2.2 MG/DL
PLATELET # BLD AUTO: 432 K/UL
PMV BLD AUTO: 10.8 FL
POCT GLUCOSE: 103 MG/DL (ref 70–110)
POCT GLUCOSE: 103 MG/DL (ref 70–110)
POCT GLUCOSE: 104 MG/DL (ref 70–110)
POCT GLUCOSE: 83 MG/DL (ref 70–110)
POCT GLUCOSE: 91 MG/DL (ref 70–110)
POTASSIUM SERPL-SCNC: 3.9 MMOL/L
PROT SERPL-MCNC: 5.6 G/DL
RBC # BLD AUTO: 3.18 M/UL
SODIUM SERPL-SCNC: 140 MMOL/L
TACROLIMUS BLD-MCNC: 10.6 NG/ML
WBC # BLD AUTO: 13.2 K/UL

## 2018-12-20 PROCEDURE — 63600175 PHARM REV CODE 636 W HCPCS: Performed by: STUDENT IN AN ORGANIZED HEALTH CARE EDUCATION/TRAINING PROGRAM

## 2018-12-20 PROCEDURE — 92610 EVALUATE SWALLOWING FUNCTION: CPT

## 2018-12-20 PROCEDURE — 36415 COLL VENOUS BLD VENIPUNCTURE: CPT

## 2018-12-20 PROCEDURE — 83735 ASSAY OF MAGNESIUM: CPT

## 2018-12-20 PROCEDURE — G8996 SWALLOW CURRENT STATUS: HCPCS | Mod: CH

## 2018-12-20 PROCEDURE — 97535 SELF CARE MNGMENT TRAINING: CPT

## 2018-12-20 PROCEDURE — 27000646 HC AEROBIKA DEVICE

## 2018-12-20 PROCEDURE — 63600175 PHARM REV CODE 636 W HCPCS: Performed by: TRANSPLANT SURGERY

## 2018-12-20 PROCEDURE — 20600001 HC STEP DOWN PRIVATE ROOM

## 2018-12-20 PROCEDURE — 84100 ASSAY OF PHOSPHORUS: CPT

## 2018-12-20 PROCEDURE — 63600175 PHARM REV CODE 636 W HCPCS: Performed by: NURSE PRACTITIONER

## 2018-12-20 PROCEDURE — 94664 DEMO&/EVAL PT USE INHALER: CPT

## 2018-12-20 PROCEDURE — 99233 SBSQ HOSP IP/OBS HIGH 50: CPT | Mod: 24,,, | Performed by: PHYSICIAN ASSISTANT

## 2018-12-20 PROCEDURE — 25000003 PHARM REV CODE 250: Performed by: STUDENT IN AN ORGANIZED HEALTH CARE EDUCATION/TRAINING PROGRAM

## 2018-12-20 PROCEDURE — 99233 PR SUBSEQUENT HOSPITAL CARE,LEVL III: ICD-10-PCS | Mod: 24,,, | Performed by: PHYSICIAN ASSISTANT

## 2018-12-20 PROCEDURE — 99900035 HC TECH TIME PER 15 MIN (STAT)

## 2018-12-20 PROCEDURE — P9047 ALBUMIN (HUMAN), 25%, 50ML: HCPCS | Mod: JG | Performed by: PHYSICIAN ASSISTANT

## 2018-12-20 PROCEDURE — 97112 NEUROMUSCULAR REEDUCATION: CPT

## 2018-12-20 PROCEDURE — 80053 COMPREHEN METABOLIC PANEL: CPT

## 2018-12-20 PROCEDURE — 25000003 PHARM REV CODE 250: Performed by: PHYSICIAN ASSISTANT

## 2018-12-20 PROCEDURE — 25000003 PHARM REV CODE 250: Performed by: NURSE PRACTITIONER

## 2018-12-20 PROCEDURE — 97530 THERAPEUTIC ACTIVITIES: CPT

## 2018-12-20 PROCEDURE — 80197 ASSAY OF TACROLIMUS: CPT

## 2018-12-20 PROCEDURE — 99252 IP/OBS CONSLTJ NEW/EST SF 35: CPT | Mod: ,,, | Performed by: NURSE PRACTITIONER

## 2018-12-20 PROCEDURE — 85025 COMPLETE CBC W/AUTO DIFF WBC: CPT

## 2018-12-20 PROCEDURE — 99252 PR INITIAL INPATIENT CONSULT,LEVL II: ICD-10-PCS | Mod: ,,, | Performed by: NURSE PRACTITIONER

## 2018-12-20 PROCEDURE — 63600175 PHARM REV CODE 636 W HCPCS: Mod: JG | Performed by: PHYSICIAN ASSISTANT

## 2018-12-20 RX ORDER — ALBUMIN HUMAN 250 G/1000ML
25 SOLUTION INTRAVENOUS ONCE
Status: COMPLETED | OUTPATIENT
Start: 2018-12-20 | End: 2018-12-20

## 2018-12-20 RX ADMIN — PANTOPRAZOLE SODIUM 40 MG: 40 TABLET, DELAYED RELEASE ORAL at 08:12

## 2018-12-20 RX ADMIN — HEPARIN SODIUM 5000 UNITS: 5000 INJECTION, SOLUTION INTRAVENOUS; SUBCUTANEOUS at 03:12

## 2018-12-20 RX ADMIN — URSODIOL 300 MG: 300 CAPSULE ORAL at 08:12

## 2018-12-20 RX ADMIN — TACROLIMUS 2 MG: 1 CAPSULE ORAL at 08:12

## 2018-12-20 RX ADMIN — HEPARIN SODIUM 5000 UNITS: 5000 INJECTION, SOLUTION INTRAVENOUS; SUBCUTANEOUS at 05:12

## 2018-12-20 RX ADMIN — TACROLIMUS 2 MG: 1 CAPSULE ORAL at 05:12

## 2018-12-20 RX ADMIN — HEPARIN SODIUM 5000 UNITS: 5000 INJECTION, SOLUTION INTRAVENOUS; SUBCUTANEOUS at 08:12

## 2018-12-20 RX ADMIN — ERGOCALCIFEROL 50000 UNITS: 1.25 CAPSULE ORAL at 08:12

## 2018-12-20 RX ADMIN — LIDOCAINE 1 PATCH: 50 PATCH CUTANEOUS at 08:12

## 2018-12-20 RX ADMIN — RAMELTEON 8 MG: 8 TABLET, FILM COATED ORAL at 08:12

## 2018-12-20 RX ADMIN — TRAMADOL HYDROCHLORIDE 50 MG: 50 TABLET, FILM COATED ORAL at 10:12

## 2018-12-20 RX ADMIN — ASPIRIN 81 MG CHEWABLE TABLET 81 MG: 81 TABLET CHEWABLE at 08:12

## 2018-12-20 RX ADMIN — ISAVUCONAZONIUM SULFATE 372 MG: 186 CAPSULE ORAL at 08:12

## 2018-12-20 RX ADMIN — ALBUMIN HUMAN 25 G: 0.25 SOLUTION INTRAVENOUS at 03:12

## 2018-12-20 RX ADMIN — ESCITALOPRAM OXALATE 20 MG: 10 TABLET ORAL at 08:12

## 2018-12-20 RX ADMIN — TIZANIDINE 2 MG: 2 TABLET ORAL at 08:12

## 2018-12-20 RX ADMIN — MIRTAZAPINE 7.5 MG: 7.5 TABLET ORAL at 08:12

## 2018-12-20 RX ADMIN — PREDNISONE 2.5 MG: 2.5 TABLET ORAL at 08:12

## 2018-12-20 NOTE — PROGRESS NOTES
[facility]  Liver Transplant  Progress Note    Patient Name: Femi Enciso  MRN: [id number]  Admission Date: 10/27/2018  Hospital Length of Stay: 54 days  Code Status: Full Code  Primary Care Provider: Primary Doctor No  Post-Operative Day: 39    ORGAN:   LIVER  Disease Etiology: Alcoholic Cirrhosis  Donor Type:    - Brain Death  CDC High Risk:   No  Donor CMV Status:   Donor CMV Status: Positive  Donor HBcAB:   Negative  Donor HCV Status:   Negative  Donor HBV JAVIER: Negative  Donor HCV JAVIER: Negative  Whole or Partial: Whole Liver  Biliary Anastomosis: End to End  Arterial Anatomy: Standard  Subjective:     History of Present Illness:  Femi Enciso is a 53yr old male with PMH of acute ETOH hepatitis and DEL/ATN evolved to ESRD requiring HD (not candidate for KTX). He is now s/p liver transplant (SM induction, DBD, CMV D+/R-). Transplant surgery noted severe collateralization, adrenal gland firmly adhered to liver. Bare area of adrenal gland required several stitches and packing to obtain fair hemostasis (EBL 15L). OR take back  for bleeding from R adrenal bed in AM (significant clot in retroperitoneum, posterior to R hepatic lobe, tract posterior to the R kidney containing significant clot, small bleeding area of retroperitoneal fat) then returned to surgery same day for hemorrhaging closed with wound vac with plans to return to OR 24-48 hours for closure (retroperitoneal /retrorenal/retrocolic hematoma on the right ). Raw retroperitoneal bleeding. Suture ligatures placed, argon beam cautery, evarest placed in retroperitoneum behind cava and right kidney. Significant oozing from upper right adrenal gland, not amenable to ligation, that portion of the adrenal gland was resected with cautery). OR take back  for washout and incisional closure, no significant bleeding or hematoma found. OR cultures   from body fluid grew enterococcus faecium VRE. ID was consulted,  started on  daptomycin for VRE treatment and cefepime/flagyl to cover for IA ty in bile. Patient with significant leukocytosis with peak on 11/22 at 48K prompting drainage of R subphrenic fluid collection, perc drain placed, culture grew VRE. Stroke code called 11/21 for lethargy and unresponsive, CT head without evidence of acute ischemic changes and CTA chest negative, vascular neurology was consulted recommended MRI brain, but patient's AMS improved shortly after event. Nephrology consulted for ESRD requiring HD. Patient overall improved on antibiotic regimen, leukocytosis and AMS improved. He was transferred to TSU on POD #15.     Hospital Course:  Interval History:  No acute events overnight. Patient feeling okay today. Lab work notable for leukocytosis. Blood cx sent yesterday 12/19 with NGTD. Afebrile. Will obtain chest x-ray to assess for cause of leukocytosis. Possibly elevated due to dehydration. Will give albumin today. Appetite stimulant started yesterday 12/19. Caregivers to bring in outside food for patient. However, if no improvement in po intake by tomorrow, plan to likely start tube feedings. Continue aggressive PT/OT.       Scheduled Meds:   albumin human 25%  25 g Intravenous Once    aspirin  81 mg Oral Daily    epoetin sherlyn (PROCRIT) injection  50 Units/kg (Dosing Weight) Intravenous Every Mon, Wed, Fri    ergocalciferol  50,000 Units Oral Q7 Days    escitalopram oxalate  20 mg Oral Nightly    heparin (porcine)  5,000 Units Subcutaneous Q8H    isavuconazonium sulfate  372 mg Oral Daily    lidocaine  1 patch Transdermal Q24H    mirtazapine  7.5 mg Oral QHS    pantoprazole  40 mg Oral Daily    predniSONE  2.5 mg Oral Daily    sulfamethoxazole-trimethoprim 400-80mg  1 tablet Oral Every Mon, Wed, Fri    tacrolimus  2 mg Oral BID    ursodiol  300 mg Oral BID    valganciclovir 50 mg/ml  100 mg Oral Once per day on Mon Wed Fri     Continuous Infusions:  PRN Meds:acetaminophen,  albuterol-ipratropium, artificial tears, bacitracin, bisacodyl, dextrose 50%, dextrose 50%, glucagon (human recombinant), glucose, glucose, heparin (porcine), midodrine, ondansetron, ramelteon, tiZANidine, traMADol    Review of Systems   Constitutional: Positive for activity change and appetite change (decreased). Negative for fatigue and fever.   HENT: Negative for congestion, facial swelling and voice change.    Eyes: Negative for pain, discharge and visual disturbance.   Respiratory: Negative for apnea, cough, choking, chest tightness, shortness of breath and wheezing.    Cardiovascular: Negative.  Negative for chest pain, palpitations and leg swelling.   Gastrointestinal: Negative for abdominal distention, abdominal pain, constipation, diarrhea, nausea and vomiting.   Endocrine: Negative.    Genitourinary: Positive for decreased urine volume. Negative for difficulty urinating, dysuria, hematuria and urgency.   Musculoskeletal: Negative.  Negative for back pain, gait problem, neck pain and neck stiffness.   Skin: Positive for wound. Negative for color change and pallor.   Allergic/Immunologic: Positive for immunocompromised state.   Neurological: Positive for weakness. Negative for dizziness, seizures, light-headedness and headaches.   Psychiatric/Behavioral: Negative for behavioral problems, confusion, decreased concentration, dysphoric mood, hallucinations, sleep disturbance and suicidal ideas. The patient is not nervous/anxious.      Objective:     Vital Signs (Most Recent):  Temp: 97.7 °F (36.5 °C) (12/20/18 1111)  Pulse: 92 (12/20/18 1211)  Resp: (!) 29 (12/20/18 1211)  BP: 126/86 (12/20/18 1211)  SpO2: 96 % (12/20/18 1211) Vital Signs (24h Range):  Temp:  [97.7 °F (36.5 °C)-98.2 °F (36.8 °C)] 97.7 °F (36.5 °C)  Pulse:  [84-96] 92  Resp:  [16-94] 29  SpO2:  [96 %-99 %] 96 %  BP: ()/(48-99) 126/86     Weight: 72.8 kg (160 lb 7.9 oz)  Body mass index is 23.03 kg/m².    Intake/Output - Last 3 Shifts        12/18 0700 - 12/19 0659 12/19 0700 - 12/20 0659 12/20 0700 - 12/21 0659    P.O. 200 190 0    Other  600     Total Intake(mL/kg) 200 (2.9) 790 (10.9) 0 (0)    Urine (mL/kg/hr)  300 (0.2)     Emesis/NG output  100     Other  2025     Stool  0     Total Output  2425     Net +200 -1635 0           Urine Occurrence 0 x      Stool Occurrence 0 x            Physical Exam   Constitutional: He is oriented to person, place, and time. He appears well-developed. No distress.   Temporal and distal extremity muscle wasting noted.   HENT:   Head: Normocephalic and atraumatic.   Eyes: Pupils are equal, round, and reactive to light.   Neck: Normal range of motion. Neck supple. No JVD present.   Cardiovascular: Normal rate, regular rhythm and normal heart sounds.   No murmur heard.  Pulmonary/Chest: Effort normal. No respiratory distress. He has decreased breath sounds in the right lower field and the left lower field. He has no wheezes. He exhibits no tenderness.   Abdominal: Soft. Bowel sounds are normal. He exhibits no distension. There is no tenderness.   Chevron inc clean, dry, intact with steri strips in place     Musculoskeletal: Normal range of motion. He exhibits no tenderness. Edema: trace ankle edema.   Neurological: He is alert and oriented to person, place, and time. He has normal reflexes.   Skin: Skin is warm and dry. Capillary refill takes 2 to 3 seconds. He is not diaphoretic. No pallor.   Psychiatric: He has a normal mood and affect. His behavior is normal. Judgment and thought content normal. His affect is not labile. He is not slowed. Cognition and memory are not impaired.   Nursing note and vitals reviewed.      Laboratory:  Immunosuppressants         Stop Route Frequency     tacrolimus capsule 2 mg      -- Oral 2 times daily        CBC:   Recent Labs   Lab 12/20/18  0633   WBC 13.20*   RBC 3.18*   HGB 9.3*   HCT 30.7*   *   MCV 97   MCH 29.2   MCHC 30.3*     CMP:   Recent Labs   Lab 12/20/18  0633   GLU  83   CALCIUM 8.6*   ALBUMIN 2.0*   PROT 5.6*      K 3.9   CO2 23      BUN 8   CREATININE 2.0*   ALKPHOS 167*   ALT 13   AST 12   BILITOT 1.4*     Labs within the past 24 hours have been reviewed.    Diagnostic Results:  I have personally reviewed all pertinent imaging studies.    Assessment/Plan:     * S/P liver transplant    - LFTs now improving slowly.  T bili was 37.6 day of transplant.  - Drain placed during take back. Drain placed to intra-abdominal abscess on 11/22.   - Fluid from collection noted with enterococcus VRE completed Zyvox treatment.  -Routine month 1 Liver US obtained 12/17 which showed elevated velocity of hepatic artery and low RIs.   - Vascular Surgery consulted. Recommend repeat US in 10-14 days to reassess. Appreciate Vascular assistance.      Hypomagnesemia    - Improved. Continue to monitor.      Severe malnutrition    Dietician following. Severe temporal, clavicle muscle depletion noted. Severe subq fat loss  -Nutrition has been poor over the past 24 hours.   -Discussed at length with patient and caregivers that if patient's nutrition status does not improve, will need supplemental nutrition via tube feeds or TPN.  -Appetite stimulant started 12/19.   -Caregivers to bring in outside food for patient.   -However, if no improvement in po intake by tomorrow, plan to likely start tube feedings.       Anxiety    - Restarted Lexapro as per psych recs, 12/13.        Acute renal failure with acute tubular necrosis superimposed on stage 3 chronic kidney disease    - 2 weeks for DEL prior to transplant  - Renal function slow to recover post-op.   - Nephrology consulted for management.   - Cont with HD as per nephrology recs.  Apprec recs.    - Lasix 160 mg challenge 11/28 w/ minimal output.    - HD MWF.   - Strict I&Os.      Intra-abdominal abscess    - Significant increase in WBC post-op.   - OR cultures from 11/18 noted with enterococcus VRE.   - Abdominal CT performed at that time  with fluid collection and drain placed on 11/22 for drainage.   - Intra-abdominal abscess culture also with enterococcus VRE.   - ID consulted and pt placed on antibxs for treatment. Pt was on Cefepime, Daptomycin, and Fluconazole for treatment.    - Pt remains with RLQ drains (one JOSE A and one Percutaneous).  One JOSE A removed 11/28.  Perc drain in placed draining tan, clear drainage.  WBC slowly improving.   - Liver US on 11/26 reviewed.   - Abdominal CT performed 11/27 and reviewed. Fluid collection noted with improvement on CT.  ID following and reassured by findings.    - Dapto, Cefepime and Flagyl changed 11/30/18 to Linezolid 600 mg PO q 12 hr x 10 days per ID.     - added back cefepime after thora.  ID re consulted.  - Completed Zyvox x 10 days per ID thru 12/9/18. Monitor.      Long-term use of immunosuppressant medication    - Cont with prograf. Draw prograf level daily and adjust dose as needed to maintain a therapeutic level.        At risk for opportunistic infections    - Cont with bactrim and valcyte for protection against opportunistic infections.   - cont Isavuconazonium.     Adrenal cortical steroids causing adverse effect in therapeutic use           Prophylactic immunotherapy    - See long term immunosuppression.        Debility    - Pt remains significantly debilitated.   - PT/OT for strengthening.   - Currently will need SNF for placement and further rehabilitation.   - Pt remains severely debilitated and not strong enough for SNF at this time.    - Cont with strengthening  -SNF consult placed 12/17. However, denied for SNF again as not strong enough at this time  -Rehab consulted.        Pleural effusion associated with hepatic disorder    - c/o increased pain w deep breath today to right side.  CXR showed increased opacity in the inferior hemothorax on the right side since 11/26/2018.  Pulse ox 97-99% on RA.  Cont to monitor.   - continues to have fluid to RLL.  WBC remains elevated.    -  thoracentesis performed on 11/30. Fluid noted with 4k WBC, 93 SEGS. cx no growth to date.  Cefepime added for treatment.  - Chest CT showing right pleural effusion improved, left w increased pleural effusion.   - Per ID, completed treatment of empyema w Cefepime thru 12/8.  - sats remain 97-99% on RA.     Type 2 diabetes mellitus without complication    - Endocrine consulted for management. Appreciate recs.   - hypoglycemia 12/10 requiring D50.    - repeat cx 12/11.       Anemia of chronic disease    - H&H stable. Monitor with daily labs.   - Chest/Abd/pelvis CT 12/4- no fluid to be drainged per transplant surgeon.  No source of bleeding.     - last liver US 11/27. Evolving peritransplant hematomas with anechoic fluid collection adjacent to the right hepatic lobe.  Small volume perihepatic free fluid.         VTE Risk Mitigation (From admission, onward)        Ordered     heparin (porcine) injection 1,000 Units  As needed (PRN)      12/12/18 1731     heparin (porcine) injection 5,000 Units  Every 8 hours      11/30/18 1149     IP VTE HIGH RISK PATIENT  Once      11/11/18 1208          The patients clinical status was discussed at multidisplinary rounds, involving transplant surgery, transplant medicine, pharmacy, nursing, nutrition, and social work    Discharge Planning: Not a candidate for discharge at this time.   No Patient Care Coordination Note on file.      Manda Jackson PA-C  Liver Transplant  [facility]

## 2018-12-20 NOTE — PLAN OF CARE
Problem: Patient Care Overview  Goal: Plan of Care Review  Patient is AAOx4, VS stable, c/o SOB with exertion only. Ambulates with walker. Adheres to 1500 ml fluid restriction. Daily standing weights ordered. Adequate output today.

## 2018-12-20 NOTE — CONSULTS
Inpatient consult to Physical Medicine Rehab  Consult performed by: Mela Scanlon NP  Consult ordered by: Manda Jackson PA-C  Reason for consult: assess rehab needs        Reviewed patient history and current admission.  Rehab team following.  Full consult to follow.    ANDI Ward, FNP-C  Physical Medicine & Rehabilitation   12/20/2018  Spectralink: [phone]

## 2018-12-20 NOTE — NURSING
"Patient still c/o no appetite. BG  all day. Tolerates only sips of energy drink and bites of jello. No output today. Refused PRN laxative. Swallow study complete, no new orders from [facility] incision has steri strips, RLQ wound (drain site) open to air, washed with soap and water.  No skin breakdown. No c/o pain or nausea. Patient did get up to chair twice today but refused IS, stated "deep breathing causes pain"  Reinforced teaching about the need for frequent repositioning and deep breathing exercises.  "

## 2018-12-20 NOTE — PLAN OF CARE
Problem: Patient Care Overview  Goal: Plan of Care Review  Pt AAOx4, bed low locked, call light within reach, wearing nonskid socks. Pt instructed to use call light for assistance, pt verbalizes understanding.   Pt denies any pain or discomfort at this time. AC/HS, 91, no coverage needed. Encouraged PO intake, pt denies being hungry. Chevron ECTOR with steri strips, CDI no SSI. Pt remains free from injury/harm at this time. Afebrile. See flowsheet for full assessment/VS.

## 2018-12-20 NOTE — PLAN OF CARE
Problem: Physical Therapy Goal  Goal: Physical Therapy Goal  Goals to be met by: 2018     Patient will increase functional independence with mobility by performin. Supine to sit with Modified [place] -not met  2. Sit to stand transfer with [place] -not met  3. Bed to chair transfer with independence using no AD -not met  4. Gait  x150 feet with Supervision using LRAD. -not met  5. Lower extremity exercise program x20 reps per handout, with independence -not met          Outcome: Ongoing (interventions implemented as appropriate)  Progressing towards goals    Percy Coleman DPT  2018

## 2018-12-20 NOTE — HPI
Femi Enciso is a 53-year-old male with PMHx of alcoholic liver cirrhosis & DEL/ATN evolved to ESRD requiring HD (not candidate for KTX).  Patient presented to [facility]/27 with acute liver failure.  S/p liver trx on 11/11/18. Hospital course further complicated by hypomagnesemia, severe malnutrition, anxiety (Lexapro as per psych recs from 12/13), acute renal failure with acute tubular necrosis superimposed on stage 3 chronic kidney disease, Intra-abdominal abscess (ID following. Drains placed but now removed. IV abx completed), debility, anemia, & pleural effusion associated with hepatic disorder (abx completed-on RA). Routine month 1 Liver US obtained 12/17 which showed elevated velocity of hepatic artery and low RIs. Vascular Surgery consulted. Recommend repeat US in 10-14 days to reassess.        Functional History: Patient lives in [facility]  with wife in a single story home with no steps to enter.  Prior to admission, (I) with ADLs and mobility. DME: none.

## 2018-12-20 NOTE — ASSESSMENT & PLAN NOTE
- Pt remains significantly debilitated.   - PT/OT for strengthening.   - Currently will need SNF for placement and further rehabilitation.   - Pt remains severely debilitated and not strong enough for SNF at this time.    - Cont with strengthening  -SNF consult placed 12/17. However, denied for SNF again as not strong enough at this time  -Rehab consulted.

## 2018-12-20 NOTE — ASSESSMENT & PLAN NOTE
-SLP re-eval for swallowing pending for 12/20  -previously on regular diet and thin liquids from 11/27  -also complaining of nausea, trouble swallowing, & abd pain while eating

## 2018-12-20 NOTE — ASSESSMENT & PLAN NOTE
Dietician following. Severe temporal, clavicle muscle depletion noted. Severe subq fat loss  -Nutrition has been poor over the past 24 hours.   -Discussed at length with patient and caregivers that if patient's nutrition status does not improve, will need supplemental nutrition via tube feeds or TPN.  -Appetite stimulant started 12/19.   -Caregivers to bring in outside food for patient.   -However, if no improvement in po intake by tomorrow, plan to likely start tube feedings.

## 2018-12-20 NOTE — PT/OT/SLP PROGRESS
Physical Therapy Treatment    Patient Name:  Femi Enciso   MRN:  [id number]    Recommendations:     Discharge Recommendations:  nursing facility, skilled   Discharge Equipment Recommendations: (TBD)   Barriers to discharge: Inaccessible home and Decreased caregiver support    Assessment:     Femi Enciso is a 53 y.o. male admitted with a medical diagnosis of S/P liver transplant.  He presents with the following impairments/functional limitations:  weakness, impaired functional mobilty, gait instability, impaired endurance, impaired balance, impaired self care skills, decreased lower extremity function, decreased upper extremity function, pain, decreased safety awareness, impaired cardiopulmonary response to activity.  Pt tolerated session c mod c/o L flank pain limiting participation on this date.  Pt continuing to require max encouragement to participate c functional activities.  Performed transfer c mod A.  Gait training deferred d/t c/o of dizziness, weakness and pain.  Pt demo poor standing tolerance - able to maintain for x1min at most.  Continues to demo poor BLE control c occasional knee buckling in standing  (R>L).  Pt safe to transfer to/from bedside chair c assistance of 1-2x person. Encouraged to perform BLE/UE therex 3x/day.  Pt would benefit from continued skilled acute PT 4x/wk to improve functional mobility.      Rehab Prognosis:  good; patient would benefit from acute skilled PT services to address these deficits and reach maximum level of function.      Recent Surgery: Procedure(s) (LRB):  LAPAROTOMY, EXPLORATORY, AFTER LIVER TRANSPLANT, LIKELY  ABDOMINAL CLOSURE (N/A) 32 Days Post-Op    Plan:     During this hospitalization, patient to be seen 4 x/week to address the above listed problems via gait training, therapeutic activities, therapeutic exercises, neuromuscular re-education  · Plan of Care Expires:  01/17/19   Plan of Care Reviewed with: patient, family    Subjective     Communicated with RN and  "OT prior to session.  Patient found UIC upon PT entry to room, agreeable to treatment.      Chief Complaint: L flank pain; BLE weakness  Patient comments/goals: "I'm going down! I need to sit!"   Pain/Comfort:  · Pain Rating 1: 7/10  · Location - Side 1: Left  · Location - Orientation 1: generalized  · Location 1: flank  · Pain Addressed 1: Pre-medicate for activity, Distraction, Reposition  · Pain Rating Post-Intervention 1: 7/10    Patients cultural, spiritual, Adventist conflicts given the current situation: none noted     Objective:     Patient found with: telemetry, pulse ox (continuous)     General Precautions: Standard, fall   Orthopedic Precautions:N/A   Braces: N/A     Functional Mobility:  · Transfers:     · Sit to Stand:  moderate assistance and of 2 persons with hand-held assist  · Balance: sitting (S); standing (mod A)      AM-PAC 6 CLICK MOBILITY  Turning over in bed (including adjusting bedclothes, sheets and blankets)?: 3  Sitting down on and standing up from a chair with arms (e.g., wheelchair, bedside commode, etc.): 2  Moving from lying on back to sitting on the side of the bed?: 3  Moving to and from a bed to a chair (including a wheelchair)?: 2  Need to walk in hospital room?: 2  Climbing 3-5 steps with a railing?: 1  Basic Mobility Total Score: 13       Therapeutic Activities and Exercises:  Pt educated on: progress; safety c mobility; benefits of OOB activities; performing therex; d/c recs - v/u  (104 - 510)  -Sit<>stand 1x from bedside chair c mod A  -Unsupported sitting x4mins for self care  -Static standing 1x1min  -refused further activities secondary to pain and request therapy to return at later time to allow medication to take effect.    ([id number] [phone] [id number])  -Sit<>stand 3x from bedside chair c mod A  -Standing weightshift 2x1min, 2x40wjcm - B knee blocked  -Standing marches: 1x2 BLE: demo difficulty c B hip flexion for foot clearance, took short/quick steps c no toe/floor clearance on B " feet  -Therex (LAQ, hip flex, modified sit-ups in chair) 1x10 ea      Patient left up in chair with all lines intact, call button in reach, RN notified and family present..    GOALS:   Multidisciplinary Problems     Physical Therapy Goals        Problem: Physical Therapy Goal    Goal Priority Disciplines Outcome Goal Variances Interventions   Physical Therapy Goal     PT, PT/OT Ongoing (interventions implemented as appropriate)     Description:  Goals to be met by: 2018     Patient will increase functional independence with mobility by performin. Supine to sit with Modified [place] -not met  2. Sit to stand transfer with [place] -not met  3. Bed to chair transfer with independence using no AD -not met  4. Gait  x150 feet with Supervision using LRAD. -not met  5. Lower extremity exercise program x20 reps per handout, with independence -not met                     Multidisciplinary Problems (Resolved)        Problem: Physical Therapy Goal    Goal Priority Disciplines Outcome Goal Variances Interventions   Physical Therapy Goal   (Resolved)     PT, PT/OT Resolved for Upgrade                     Time Tracking:     PT Received On: 18  PT Start Time: 0838     PT Stop Time: 0857  PT Total Time (min): 19 min + 17 min ([id number])    Billable Minutes: Therapeutic Activity 9 min and Neuromuscular Re-education 15 min    Treatment Type: Treatment  PT/PTA: PT     PTA Visit Number: 0     Percy Coleman, PT  2018

## 2018-12-20 NOTE — PROGRESS NOTES
IHD completed, blood returned. Pt alert & stable.Heparin lock  Instilled dual lumens 1.8cc per port. Duration 3.0hrs, Qb 400ml/min, Qd 800ml/min, UFG 1.5L. Report called. Pt transported to [id number] via stretcher.

## 2018-12-20 NOTE — PLAN OF CARE
Problem: Occupational Therapy Goal  Goal: Occupational Therapy Goal  Goals to be met by: 12/31/18 ( Goals revised: 12/18)    Patient will increase functional independence with ADLs by performing:    UE Dressing with Supervision.   LE Dressing with Minimal Assistance.( met with socks; continue with pants)  Grooming while standing at sink with Stand-by Assistance.  Toileting from toilet with Moderate Assistance for hygiene and clothing management.   Toilet transfer to toilet with moderate assistance.  Upper extremity  theraband exercise program x  15 reps  with independence. Met 12/9            Outcome: Ongoing (interventions implemented as appropriate)  Continue POC     Maricruz Garcia, OTR/L  [phone]  12/20/2018

## 2018-12-20 NOTE — ASSESSMENT & PLAN NOTE
-2/2 liver trx with deconditioning     See hospital course for functional, cognitive/speech/language, and nutrition/swallow status.      Recommendations  -  Encourage mobility, OOB in chair at least 3 hours per day, and early ambulation as appropriate  -  PT/OT evaluate and treat  -  Pain management  -  Monitor for and prevent skin breakdown and pressure ulcers  · Early mobility, repositioning/weight shifting every 20-30 minutes when sitting, turn patient every 2 hours, proper mattress/overlay and chair cushioning, pressure relief/heel protector boots  -  Reviewed discharge options (IP rehab, SNF, HH therapy, and OP therapy)

## 2018-12-20 NOTE — HOSPITAL COURSE
11/21/2018: Evaluated by therapy.  Bed mobility MaxA.  Sit to stand ModA x 2 ppl.  Ambulated 1 step MaxA.  UBD/LBD/toileting MaxA. Grooming Julito.  12/19/2018: Participated with therapy.  Bed mobility Julito.  Sit to stand Min-ModA and transfers ModA x 2 ppl.  No gait 2/2 nausea and dizziness during steps for trx.  UBD Julito and feeding (I).   12/20/2018: Participated with OT.  Sit to stand ModA x 2 ppl. No gait 2/2 anxiety.  Grooming Mod (I).   12/21/2018: Participated with PT.  Bed mobility Julito.  Sit to stand and transfers modA.  Ambulated ~3 steps modA.   12/24/2018: Participated with therapy.  Bed mobility CGA.  Sit to stand Min-ModA.  Ambulated-limited steps Min-MaxA x 2.  UBD Julito.  12/26/18: No therapy 2/2 dialysis.  12/27/2018: Participated with therapy.  Bed mobility CGA.  Sit to stand and transfers Julito.  Ambulated 3 ft Julito.  Pt refused all oob activities and ADL training. Pt requested OT to teach pt how to perform BUE exercises w/ theraband.  12/31/2019: Participated with therapy.  Bed mobility SBA-Julito.  Sit to stand Julito x 2 ppl.  Ambulated 20 ft Julito & RW.  UBD Julito and LBD/grooming setupA.  01/03/2019: Participated with therapy.  Bed mobility CGA.  Sit to stand CGA & RW and transfers Julito & RW.  Ambulated 56 ft CGA & RW.  Grooming SBA.  01/06/2019: Participated with therapy.  Bed mobility SV-Julito.  Sit to stand CGA-Julito and transfers CGA & RW.  Ambulated 150 ft CGA-Julito.  UBD Julito and LBD CGA. [place] tube removed for possible PEG placement today.   01/07/2019: Participated with therapy.  Bed mobility SBA-Julito .  Sit to stand MaxA & RW and transfers Julito-ModA & RW.  Ambulated 48 ft Julito & RW with chair to follow.  UBD Julito and grooming CGA.

## 2018-12-20 NOTE — PHYSICIAN QUERY
PT Name: Femi Enciso  MR #: [id number]     Physician Query Form - Medication-Correlation for Diagnosis      CDS Alison Dumont RN, BSN        Contact Information:  [phone]    [email or web address]           This form is a permanent document in the medical record.     Query Date: December 20, 2018      By submitting this query, we are merely seeking further clarification of documentation.  Please utilize your independent clinical judgment when addressing the question(s) below.    The medical record contains the following:  The patient has an order for the following medication(s):    Potassium level:  3.1             Chem Profile  12/14     potassium chloride CR capsule 30 mEq   Once               MAR 12/14            Please provide the corresponding diagnosis or diagnoses that support(s) the use of the medication(s)   Provider Use Only      Diagnosis (es): __________________hypokalemia_______________________      [ [   ] ] Clinically Undetermined

## 2018-12-20 NOTE — PLAN OF CARE
Problem: SLP Goal  Goal: SLP Goal  SLP Clinical Swallow Evaluation completed. See note for details.

## 2018-12-20 NOTE — PROGRESS NOTES
SW met with patient alone initially this am to assess for coping issues and provide psychosocial support and encouragement to patient.   Pt presents alert and oriented x 4, engaging, communicative, but soft spoken.   Pt having difficulty with nutrition and eating and remains weak.  Pt verbalizing that he is working with therapy and working on standing.   Pt reports that his mom and wife will be back to his room shortly.       SW returned to pts room later with pts wife and pts mother present to discuss SNF benefits and [facility] inpatient rehab.    SW discused with pts wife that her insurance does not leah SNF placement benefits, but does inpatient rehab and that [facility] now has a contract with [facility].    The LTS rounding team has consulted PMR for inpt rehab placement.  Pts wife verbalized understanding of this information and has no other psychosocial needs identified at this time.      VITALIY spoke with Bennie at [facility] ([phone]) regarding changes in patient's dc plans.   Bennie the administer at [facility] is asking the SW to cancel the outpatient HD referral and initiate new referral closer to an outpatient discharge, since Inpatient rehab can provide HD to patient.   VITALIY discussed with ALISON Khan who is in agreement.   VITALIY contacted [facility] intake to cancel referral at this time, phone [phone], speaking with Sherrell/Maggy is the coordinator, Sherrell sending a msg to Maggy to cancel the referral.    SW remains available for all transplant resources, education, psychosocial support and assist with all dc planning needs.

## 2018-12-20 NOTE — SUBJECTIVE & OBJECTIVE
Scheduled Meds:   albumin human 25%  25 g Intravenous Once    aspirin  81 mg Oral Daily    epoetin sherlyn (PROCRIT) injection  50 Units/kg (Dosing Weight) Intravenous Every Mon, Wed, Fri    ergocalciferol  50,000 Units Oral Q7 Days    escitalopram oxalate  20 mg Oral Nightly    heparin (porcine)  5,000 Units Subcutaneous Q8H    isavuconazonium sulfate  372 mg Oral Daily    lidocaine  1 patch Transdermal Q24H    mirtazapine  7.5 mg Oral QHS    pantoprazole  40 mg Oral Daily    predniSONE  2.5 mg Oral Daily    sulfamethoxazole-trimethoprim 400-80mg  1 tablet Oral Every Mon, Wed, Fri    tacrolimus  2 mg Oral BID    ursodiol  300 mg Oral BID    valganciclovir 50 mg/ml  100 mg Oral Once per day on Mon Wed Fri     Continuous Infusions:  PRN Meds:acetaminophen, albuterol-ipratropium, artificial tears, bacitracin, bisacodyl, dextrose 50%, dextrose 50%, glucagon (human recombinant), glucose, glucose, heparin (porcine), midodrine, ondansetron, ramelteon, tiZANidine, traMADol    Review of Systems   Constitutional: Positive for activity change and appetite change (decreased). Negative for fatigue and fever.   HENT: Negative for congestion, facial swelling and voice change.    Eyes: Negative for pain, discharge and visual disturbance.   Respiratory: Negative for apnea, cough, choking, chest tightness, shortness of breath and wheezing.    Cardiovascular: Negative.  Negative for chest pain, palpitations and leg swelling.   Gastrointestinal: Negative for abdominal distention, abdominal pain, constipation, diarrhea, nausea and vomiting.   Endocrine: Negative.    Genitourinary: Positive for decreased urine volume. Negative for difficulty urinating, dysuria, hematuria and urgency.   Musculoskeletal: Negative.  Negative for back pain, gait problem, neck pain and neck stiffness.   Skin: Positive for wound. Negative for color change and pallor.   Allergic/Immunologic: Positive for immunocompromised state.   Neurological:  Positive for weakness. Negative for dizziness, seizures, light-headedness and headaches.   Psychiatric/Behavioral: Negative for behavioral problems, confusion, decreased concentration, dysphoric mood, hallucinations, sleep disturbance and suicidal ideas. The patient is not nervous/anxious.      Objective:     Vital Signs (Most Recent):  Temp: 97.7 °F (36.5 °C) (12/20/18 1111)  Pulse: 92 (12/20/18 1211)  Resp: (!) 29 (12/20/18 1211)  BP: 126/86 (12/20/18 1211)  SpO2: 96 % (12/20/18 1211) Vital Signs (24h Range):  Temp:  [97.7 °F (36.5 °C)-98.2 °F (36.8 °C)] 97.7 °F (36.5 °C)  Pulse:  [84-96] 92  Resp:  [16-94] 29  SpO2:  [96 %-99 %] 96 %  BP: ()/(48-99) 126/86     Weight: 72.8 kg (160 lb 7.9 oz)  Body mass index is 23.03 kg/m².    Intake/Output - Last 3 Shifts       12/18 0700 - 12/19 0659 12/19 0700 - 12/20 0659 12/20 0700 - 12/21 0659    P.O. 200 190 0    Other  600     Total Intake(mL/kg) 200 (2.9) 790 (10.9) 0 (0)    Urine (mL/kg/hr)  300 (0.2)     Emesis/NG output  100     Other  2025     Stool  0     Total Output  2425     Net +200 -1635 0           Urine Occurrence 0 x      Stool Occurrence 0 x            Physical Exam   Constitutional: He is oriented to person, place, and time. He appears well-developed. No distress.   Temporal and distal extremity muscle wasting noted.   HENT:   Head: Normocephalic and atraumatic.   Eyes: Pupils are equal, round, and reactive to light.   Neck: Normal range of motion. Neck supple. No JVD present.   Cardiovascular: Normal rate, regular rhythm and normal heart sounds.   No murmur heard.  Pulmonary/Chest: Effort normal. No respiratory distress. He has decreased breath sounds in the right lower field and the left lower field. He has no wheezes. He exhibits no tenderness.   Abdominal: Soft. Bowel sounds are normal. He exhibits no distension. There is no tenderness.   Chevron inc clean, dry, intact with steri strips in place     Musculoskeletal: Normal range of motion. He  exhibits no tenderness. Edema: trace ankle edema.   Neurological: He is alert and oriented to person, place, and time. He has normal reflexes.   Skin: Skin is warm and dry. Capillary refill takes 2 to 3 seconds. He is not diaphoretic. No pallor.   Psychiatric: He has a normal mood and affect. His behavior is normal. Judgment and thought content normal. His affect is not labile. He is not slowed. Cognition and memory are not impaired.   Nursing note and vitals reviewed.      Laboratory:  Immunosuppressants         Stop Route Frequency     tacrolimus capsule 2 mg      -- Oral 2 times daily        CBC:   Recent Labs   Lab 12/20/18  0633   WBC 13.20*   RBC 3.18*   HGB 9.3*   HCT 30.7*   *   MCV 97   MCH 29.2   MCHC 30.3*     CMP:   Recent Labs   Lab 12/20/18  0633   GLU 83   CALCIUM 8.6*   ALBUMIN 2.0*   PROT 5.6*      K 3.9   CO2 23      BUN 8   CREATININE 2.0*   ALKPHOS 167*   ALT 13   AST 12   BILITOT 1.4*     Labs within the past 24 hours have been reviewed.    Diagnostic Results:  I have personally reviewed all pertinent imaging studies.

## 2018-12-20 NOTE — SUBJECTIVE & OBJECTIVE
Past Medical History:   Diagnosis Date    Liver cirrhosis, alcoholic 09/30/2018     Past Surgical History:   Procedure Laterality Date    EGD (ESOPHAGOGASTRODUODENOSCOPY) N/A 11/6/2018    Performed by Duarte Jules MD at [facility] ENDO (2ND FLR)    ESOPHAGOGASTRODUODENOSCOPY N/A 11/6/2018    Procedure: EGD (ESOPHAGOGASTRODUODENOSCOPY);  Surgeon: Duarte Jules MD;  Location: [facility] ENDO (2ND FLR);  Service: Endoscopy;  Laterality: N/A;    EXPLORATORY LAPAROTOMY AFTER LIVER TRANSPLANTATION N/A 11/16/2018    Procedure: LAPAROTOMY, EXPLORATORY, AFTER LIVER TRANSPLANT;  Surgeon: Amadou Lester Jr., MD;  [facility];  Service: Transplant;  Laterality: N/A;    EXPLORATORY LAPAROTOMY AFTER LIVER TRANSPLANTATION N/A 11/18/2018    Procedure: LAPAROTOMY, EXPLORATORY, AFTER LIVER TRANSPLANT, LIKELY  ABDOMINAL CLOSURE;  Surgeon: Amadou Lester Jr., MD;  Location: [facility] OR [address] [place] [address];  Service: Transplant;  Laterality: N/A;    INSERTION OF TUNNELED CENTRAL VENOUS HEMODIALYSIS CATHETER N/A 11/7/2018    Procedure: INSERTION, CATHETER, CENTRAL VENOUS, HEMODIALYSIS, TUNNELED;  Surgeon: Brock Gonzalez MD;  Location: [facility] CATH LAB;  Service: Nephrology;  Laterality: N/A;    INSERTION, CATHETER, CENTRAL VENOUS, HEMODIALYSIS, TUNNELED N/A 11/7/2018    Performed by Brock Gonzalez MD at [facility] CATH LAB    LAPAROTOMY, EXPLORATORY N/A 11/16/2018    Procedure: LAPAROTOMY, EXPLORATORY;  Surgeon: Amadou Lester Jr., MD;  Location: [facility] OR [address] [place] [address];  Service: Transplant;  Laterality: N/A;    LAPAROTOMY, EXPLORATORY N/A 11/16/2018    Performed by Amadou Lester Jr., MD at [facility] OR 2ND FLR    LAPAROTOMY, EXPLORATORY, AFTER LIVER TRANSPLANT N/A 11/16/2018    Per[facility]    LAPAROTOMY, EXPLORATORY, AFTER LIVER TRANSPLANT, LIKELY  ABDOMINAL CLOSURE N/A 11/18/2018    Performed by Amadou Lester Jr., MD at [facility] OR 2ND FLR    LIVER TRANSPLANT N/A 11/11/2018    Procedure:  TRANSPLANT, LIVER;  Surgeon: Shmuel Leary MD;  Location: [facility] OR [address] [place] [address];  Service: Transplant;  Laterality: N/A;    TRANSPLANT, LIVER N/A 11/11/2018    Performed by Shmuel Leary MD at [facility] OR [address] [place] [address]     Review of patient's allergies indicates:   Allergen Reactions    Penicillins Nausea And Vomiting and Rash     Full body rash from cefepime as well       Scheduled Medications:    aspirin  81 mg Oral Daily    epoetin sherlyn (PROCRIT) injection  50 Units/kg (Dosing Weight) Intravenous Every Mon, Wed, Fri    ergocalciferol  50,000 Units Oral Q7 Days    escitalopram oxalate  20 mg Oral Nightly    heparin (porcine)  5,000 Units Subcutaneous Q8H    isavuconazonium sulfate  372 mg Oral Daily    lidocaine  1 patch Transdermal Q24H    mirtazapine  7.5 mg Oral QHS    pantoprazole  40 mg Oral Daily    predniSONE  2.5 mg Oral Daily    sulfamethoxazole-trimethoprim 400-80mg  1 tablet Oral Every Mon, Wed, Fri    tacrolimus  2 mg Oral BID    ursodiol  300 mg Oral BID    valganciclovir 50 mg/ml  100 mg Oral Once per day on Mon Wed Fri       PRN Medications: acetaminophen, albuterol-ipratropium, artificial tears, bacitracin, bisacodyl, dextrose 50%, dextrose 50%, glucagon (human recombinant), glucose, glucose, heparin (porcine), midodrine, ondansetron, ramelteon, tiZANidine, traMADol    Family History     None        Tobacco Use    Smoking status: Never Smoker   Substance and Sexual Activity    Alcohol use: Not on file    Drug use: Not on file    Sexual activity: Not on file     Review of Systems   Constitutional: Positive for activity change and fatigue. Negative for fever.   HENT: Positive for trouble swallowing. Negative for voice change.    Eyes: Negative for photophobia and visual disturbance.   Respiratory: Negative for cough and shortness of breath.    Cardiovascular: Negative for chest pain and palpitations.   Gastrointestinal: Positive for abdominal distention, abdominal pain, nausea  and vomiting. Negative for diarrhea.   Genitourinary: Negative for difficulty urinating and flank pain.   Musculoskeletal: Positive for gait problem. Negative for arthralgias.   Skin: Positive for wound (surgical). Negative for color change.   Neurological: Positive for dizziness and weakness.   Psychiatric/Behavioral: Negative for agitation and confusion.     Objective:     Vital Signs (Most Recent):  Temp: 97.7 °F (36.5 °C) (12/20/18 1111)  Pulse: 92 (12/20/18 1211)  Resp: (!) 29 (12/20/18 1211)  BP: 126/86 (12/20/18 1211)  SpO2: 96 % (12/20/18 1211)    Vital Signs (24h Range):  Temp:  [97.7 °F (36.5 °C)-98.2 °F (36.8 °C)] 97.7 °F (36.5 °C)  Pulse:  [84-96] 92  Resp:  [16-94] 29  SpO2:  [96 %-99 %] 96 %  BP: ()/(48-99) 126/86     Body mass index is 23.03 kg/m².    Physical Exam   Constitutional: He is oriented to person, place, and time. He appears well-developed.   very thin    HENT:   Head: Normocephalic and atraumatic.   Eyes: Right eye exhibits no discharge. Left eye exhibits no discharge. No scleral icterus.   Neck: Neck supple.   Cardiovascular: Normal rate and intact distal pulses.   Pulmonary/Chest: Effort normal. No respiratory distress.   Abdominal: Soft. He exhibits no distension. There is no tenderness.   Musculoskeletal: Normal range of motion. He exhibits no edema or deformity.   Generalized deconditioning    Neurological: He is alert and oriented to person, place, and time. No sensory deficit.   Follows commands    Skin: Skin is warm and dry.   Psychiatric: He has a normal mood and affect. His behavior is normal.     NEUROLOGICAL EXAMINATION:     MENTAL STATUS   Oriented to person, place, and time.       Diagnostic Results:   Labs: Reviewed  ECG: Reviewed  X-Ray: Reviewed  US: Reviewed  CT: Reviewed

## 2018-12-20 NOTE — PT/OT/SLP PROGRESS
Occupational Therapy   Co-Treatment    Name: Femi Enciso  MRN: [id number]  Admitting Diagnosis:  S/P liver transplant  32 Days Post-Op    Recommendations:     Discharge Recommendations: nursing facility, skilled  Discharge Equipment Recommendations:  (TBD)  Barriers to discharge:  (pt requires increased assistance at current functional level of assistance )    Subjective     Pain/Comfort:  · Pain Rating 1: 7/10  · Location - Side 1: Left   · Location - Orientation 1: generalized  · Location 1: flank  · Pain Addressed 1: Pre-medicate for activity, Reposition, Distraction  · Pain Rating Post-Intervention 1: 7/10    Objective:     Communicated with: RN prior to session.  Patient found with all lines intact, call button in reach and family present and telemetry, pulse ox (continuous) upon OT entry to room. Pt found seated UIC upon OT entry. Pt received x2 therapy sessions this date    General Precautions: Standard, fall   Orthopedic Precautions:N/A   Braces: N/A     Occupational Performance:    Bed Mobility:    · NP, pt found seated UIC and left seated UIC.     Functional Mobility/Transfers:  · Patient completed x 4 reps Sit <> Stand Transfer from bedside chair with moderate assistance and of 2 persons  with  hand-held assist and B knees guarded/blocked   · Functional Mobility: Pt unable to perform functional mobility due to increased anxiety in standing and pt requesting to immediately sit.  · Static Standing: min <> mod A x 2 persons with B knees blocked/guarded.     Activities of Daily Living:  · Grooming:mod independence pt completed oral care, brushed hair and washed face seated UIC. Pt refused to attempt in standing with tray table in front of pt. Pt educated on therapy progression with completing self-care tasks in standing.     [facility] 6 Click ADL: 17    Treatment & Education:  1st therapy session: ([id number])   · Pt received pain medication but requested for therapist to return later in PM.   · Pt performed x 1 sit  <>stand from bedside chair and immediately requested to sit due to increased pain in L flank.   · Pt completed self-care sitting UIC with mod independence and set-up A of all items needed   · After completing self-care, pt refused to attempt OOB mobility and requested for therapy to come back later for medication to take effect.   · Therapy agreeable to return 1 hour later    2nd therapy session: ([id number])   - Pt performed x 3 sit <>stand transfer from bedside chair with mod A x 2 persons. B knees blocked. Pt would demo self-limiting behaviors in which therapist provided education on controlled breathing and to count to 10 before impulsively sitting back into chair.   - pt performed standing weightshift x 2 trials for ~45 secs - 1 min with PT providing verbal cues for sequence.   - Pt educated on therapy progression and importance of OOB ax.   - Pt educated on performing chair pushups while seated UIC in order to improve B UE strength in preparation for functional sit <>stand transfers from various surface levels with visual demonstration provided by therapist.   - Pt left with PT to perform B LE there ex.     Patient left up in chair with all lines intact, call button in reach and RN notified  Education:    Assessment:     Femi Enciso is a 53 y.o. male with a medical diagnosis of S/P liver transplant.  He presents with the following performance deficits affecting function are weakness, impaired endurance, impaired self care skills, impaired functional mobilty, gait instability, decreased upper extremity function, decreased lower extremity function, impaired balance, decreased safety awareness, pain, impaired cardiopulmonary response to activity.     Rehab Prognosis:  Good; patient would benefit from acute skilled OT services to address these deficits and reach maximum level of function.       Plan:     Patient to be seen 4 x/week to address the above listed problems via self-care/home management, therapeutic  activities, therapeutic exercises, neuromuscular re-education  · Plan of Care Expires: 12/21/18  · Plan of Care Reviewed with: patient, family    This Plan of care has been discussed with the patient who was involved in its development and understands and is in agreement with the identified goals and treatment plan    GOALS:   Multidisciplinary Problems     Occupational Therapy Goals        Problem: Occupational Therapy Goal    Goal Priority Disciplines Outcome Interventions   Occupational Therapy Goal     OT, PT/OT Ongoing (interventions implemented as appropriate)    Description:  Goals to be met by: 12/31/18 ( Goals revised: 12/18)    Patient will increase functional independence with ADLs by performing:    UE Dressing with Supervision.   LE Dressing with Minimal Assistance.( met with socks; continue with pants)  Grooming while standing at sink with Stand-by Assistance.  Toileting from toilet with Moderate Assistance for hygiene and clothing management.   Toilet transfer to toilet with moderate assistance.  Upper extremity  theraband exercise program x  15 reps  with independence. Met 12/9                       Multidisciplinary Problems (Resolved)        Problem: Occupational Therapy Goal    Goal Priority Disciplines Outcome Interventions   Occupational Therapy Goal   (Resolved)     OT, PT/OT Resolved for Upgrade                    Time Tracking:     OT Date of Treatment: 12/20/18  OT Start Time: 0843 OT returned 1000  OT Stop Time: 0857 OT ended 1015  OT Total Time (min): 14 min +15 mins = 29mins total time     Billable Minutes:Self Care/Home Management 10  Therapeutic Activity 15    Maricruz Garcia OT  12/20/2018

## 2018-12-20 NOTE — PT/OT/SLP EVAL
Speech Language Pathology Evaluation  Bedside Swallow  Discharge Summary    Patient Name:  Femi Encios   MRN:  [id number]  Admitting Diagnosis: S/P liver transplant    Recommendations:                 General Recommendations:  GI evaluation and Follow-up not indicated  Diet recommendations:  Regular, Thin   Aspiration Precautions: Standard aspiration precautions   Team paged, awaiting call back  General Precautions: Standard, fall  Communication strategies:  none    History:     Past Medical History:   Diagnosis Date    Liver cirrhosis, alcoholic 09/30/2018       Past Surgical History:   Procedure Laterality Date    EGD (ESOPHAGOGASTRODUODENOSCOPY) N/A 11/6/2018    Performed by Duarte Jules MD at [facility] ([address] [place] [address])    ESOPHAGOGASTRODUODENOSCOPY N/A 11/6/2018    Procedure: EGD (ESOPHAGOGASTRODUODENOSCOPY);  Surgeon: Duarte Jules MD;  Location: [address]);  Service: Endoscopy;  Laterality: N/A;    EXPLORATORY LAPAROTOMY AFTER LIVER TRANSPLANTATION N/A 11/16/2018    Procedure: LAPAROTOMY, EXPLORATORY, AFTER LIVER TRANSPLANT;  Surgeon: Amadou Lester Jr., MD;  Location: [address];  Service: Transplant;  Laterality: N/A;    EXPLORATORY LAPAROTOMY AFTER LIVER TRANSPLANTATION N/A 11/18/2018    Procedure: LAPAROTOMY, EXPLORATORY, AFTER LIVER TRANSPLANT, LIKELY  ABDOMINAL CLOSURE;  Surgeon: Amadou Lester Jr., MD;  Location: [address];  Service: Transplant;  Laterality: N/A;    INSERTION OF TUNNELED CENTRAL VENOUS HEMODIALYSIS CATHETER N/A 11/7/2018    Procedure: INSERTION, CATHETER, CENTRAL VENOUS, HEMODIALYSIS, TUNNELED;  Surgeon: Brock Gonzalez MD;  Location: [facility] CATH LAB;  Service: Nephrology;  Laterality: N/A;    INSERTION, CATHETER, CENTRAL VENOUS, HEMODIALYSIS, TUNNELED N/A 11/7/2018    Performed by Brock Gonzalez MD at [facility] CATH LAB    LAPAROTOMY, EXPLORATORY N/A 11/16/2018    Procedure: LAPAROTOMY, EXPLORATORY;  Surgeon: Amadou Lester Jr., MD;  Location: [facility] OR  "2ND FLR;  Service: Transplant;  Laterality: N/A;    LAPAROTOMY, EXPLORATORY N/A 11/16/2018    Performed by Amadou Lester Jr., MD at [facility] OR 2ND FLR    LAPAROTOMY, EXPLORATORY, AFTER LIVER TRANSPLANT N/A 11/16/2018    Performed by Amadou Lester Jr., MD at [facility] OR 2ND FLR    LAPAROTOMY, EXPLORATORY, AFTER LIVER TRANSPLANT, LIKELY  ABDOMINAL CLOSURE N/A 11/18/2018    Performed by Amadou Lester Jr., MD at [facility] OR 2ND FLR    LIVER TRANSPLANT N/A 11/11/2018    Procedure: TRANSPLANT, LIVER;  Surgeon: Shmuel Leary MD;  [facility];  Service: Transplant;  Laterality: N/A;    TRANSPLANT, LIVER N/A 11/11/2018    Performed by Shmuel Leary MD at [facility] OR [facility] FLR     Chest X-Rays: 12/20    Prior diet: regular & thin since liver transplant 11/11/18    Pt & family report that since liver transplant, pt does not have trouble swallowing but is unable to "keep solids down." He reports sensation of burning once bolus reaches stomach & then feels it coming up through chest & then in throat "sometimes it comes up sometimes it doesn't, 2 bites it comes up." He reports he sometimes has thouble with liquids but worse with solids.     Subjective   Awake & alert. Family at bedside.     Pain/Comfort:  · Pain Rating 1: 0/10  · Pain Rating Post-Intervention 2: 0/10    Objective:     Oral Musculature Evaluation  · Oral Musculature: WFL  · Dentition: present and adequate  · Mucosal Quality: adequate  · Mandibular Strength and Mobility: WNL  · Oral Labial Strength and Mobility: WFL  · Lingual Strength and Mobility: WFL  · Volitional Cough: adequate  · Volitional Swallow: adequate  · Voice Prior to PO Intake: clear    Bedside Swallow Eval:   Consistencies Assessed:  · Thin liquids cups sips x3, via straw x3  · Solids 1/4 trish cracker     Oral Phase:   · WFL    Pharyngeal Phase:   · no overt clinical signs/symptoms of aspiration  · no overt clinical signs/symptoms of pharyngeal dysphagia "   ·   Assessment:     Femi Enciso is a 53 y.o. male with oropharyngeal swallow appears WFL. SLP recs further GI testing to address pt's complaints. No ST service follow up warranted at this time for swallowing.     Goals:   Multidisciplinary Problems     SLP Goals        Problem: SLP Goal    Goal Priority Disciplines Outcome   SLP Goal   (Resolved)     SLP Resolved for Upgrade   Description:  Speech Language Pathology Goals  Goals expected to be met by 11/30/2018  1. Pt will participate in ongoing swallow assessment MET  2. Pt will tolerate regular diet with thin liquids, MOD I MET  3. Educate Pt and family on S/S aspiration                   Problem: SLP Goal    Goal Priority Disciplines Outcome   SLP Goal     SLP                    Plan:     · Patient to be seen:  4 x/week   · Plan of Care expires:  12/23/18  · Plan of Care reviewed with:  patient, family   · SLP Follow-Up:  No       Discharge recommendations:  (tbd)     Time Tracking:     SLP Treatment Date:   12/20/18  Speech Start Time:  1330  Speech Stop Time:  1340     Speech Total Time (min):  10 min    Billable Minutes: Eval Swallow and Oral Function 10    Fabby Cox CCC-SLP  12/20/2018

## 2018-12-20 NOTE — CONSULTS
[facility]  Physical Medicine & Rehab  Consult Note    Patient Name: Femi Enciso  MRN: [id number]  Admission Date: 10/27/2018  Hospital Length of Stay: 54 days  Attending Physician: Femi Patel MD     Inpatient consult to Physical Medicine & Rehabilitation  Consult performed by: Mela Scanlon NP  Consult requested by:  Femi Patel MD    Reason for Consult:  assess rehabilitation needs  Consults  Subjective:     Principal Problem: S/P liver transplant    HPI: Femi Enciso is a 53-year-old male with PMHx of alcoholic liver cirrhosis & DEL/ATN evolved to ESRD requiring HD (not candidate for KTX).  Patient presented to [facility] on 10/27 with acute liver failure.  S/p liver trx on 11/11/18. Hospital course further complicated by hypomagnesemia, severe malnutrition, anxiety (Lexapro as per psych recs from 12/13), acute renal failure with acute tubular necrosis superimposed on stage 3 chronic kidney disease, Intra-abdominal abscess (ID following. Drains placed but now removed. IV abx completed), debility, anemia, & pleural effusion associated with hepatic disorder (abx completed-on RA). Routine month 1 Liver US obtained 12/17 which showed elevated velocity of hepatic artery and low RIs. Vascular Surgery consulted. Recommend repeat US in 10-14 days to reassess.      Functional History: Patient lives in [facility]  with wife in a single story home with no steps to enter.  Prior to admission, (I) with ADLs and mobility. DME: none.     Hospital Course: 11/21/2018: Evaluated by therapy.  Bed mobility MaxA.  Sit to stand ModA x 2 ppl.  Ambulated 1 step MaxA.  UBD/LBD/toileting MaxA. Grooming Vijay.  12/19/2018: Participated with therapy.  Bed mobility Vijay.  Sit to stand Min-ModA and transfers ModA x 2 ppl.  No gait 2/2 nausea and dizziness during steps for trx.  UBD Vijay and feeding (I). rec SNF       Past Medical History:   Diagnosis Date    Liver cirrhosis, alcoholic 09/30/2018     Past Surgical History:    Procedure Laterality Date    EGD (ESOPHAGOGASTRODUODENOSCOPY) N/A 11/6/2018    Performed by Duarte Jules MD at [facility] ENDO (2ND FLR)    ESOPHAGOGASTRODUODENOSCOPY N/A 11/6/2018    Procedure: EGD (ESOPHAGOGASTRODUODENOSCOPY);  Surgeon: Duarte Jules MD;  Location: [facility] (2ND FLR);  Service: Endoscopy;  Laterality: N/A;    EXPLORATORY LAPAROTOMY AFTER LIVER TRANSPLANTATION N/A 11/16/2018    Procedure: LAPAROTOMY, EXPLORATORY, AFTER LIVER TRANSPLANT;  Surgeon: Amadou Lester Jr., MD;  [facility];  Service: Transplant;  Laterality: N/A;    EXPLORATORY LAPAROTOMY AFTER LIVER TRANSPLANTATION N/A 11/18/2018    Procedure: LAPAROTOMY, EXPLORATORY, AFTER LIVER TRANSPLANT, LIKELY  ABDOMINAL CLOSURE;  Surgeon: Amadou Lester Jr., MD;  Location: [facility] OR [address] [place] [address];  Service: Transplant;  Laterality: N/A;    INSERTION OF TUNNELED CENTRAL VENOUS HEMODIALYSIS CATHETER N/A 11/7/2018    Procedure: INSERTION, CATHETER, CENTRAL VENOUS, HEMODIALYSIS, TUNNELED;  Surgeon: Brock Gonzalez MD;  Location: [facility] CATH LAB;  Service: Nephrology;  Laterality: N/A;    INSERTION, CATHETER, CENTRAL VENOUS, HEMODIALYSIS, TUNNELED N/A 11/7/2018    Performed by Brock Gonzalez MD at [facility] CATH LAB    LAPAROTOMY, EXPLORATORY N/A 11/16/2018    Procedure: LAPAROTOMY, EXPLORATORY;  Surgeon: Amadou Lester Jr., MD;  Location: [facility] OR [address] [place] [address];  Service: Transplant;  Laterality: N/A;    LAPAROTOMY, EXPLORATORY N/A 11/16/2018    Performed by Amadou Lester Jr., MD at [facility] OR 2ND FLR    LAPAROTOMY, EXPLORATORY, AFTER LIVER TRANSPLANT N/A 11/16/2018    Performed by Amadou Lester Jr., MD at [facility] OR 2ND FLR    LAPAROTOMY, EXPLORATORY, AFTER LIVER TRANSPLANT, LIKELY  ABDOMINAL CLOSURE N/A 11/18/2018    Performed by Amadou Lester Jr., MD at [facility] OR 2ND FLR    LIVER TRANSPLANT N/A 11/11/2018    Procedure: TRANSPLANT, LIVER;  Surgeon: Shmuel Leary MD;  Location: [facility] OR [address] [place] [address];  Service: Transplant;   Laterality: N/A;    TRANSPLANT, LIVER N/A 11/11/2018    Performed by Shmuel Leary MD at [facility] OR [address] [place] [address]     Review of patient's allergies indicates:   Allergen Reactions    Penicillins Nausea And Vomiting and Rash     Full body rash from cefepime as well       Scheduled Medications:    aspirin  81 mg Oral Daily    epoetin sherlyn (PROCRIT) injection  50 Units/kg (Dosing Weight) Intravenous Every Mon, Wed, Fri    ergocalciferol  50,000 Units Oral Q7 Days    escitalopram oxalate  20 mg Oral Nightly    heparin (porcine)  5,000 Units Subcutaneous Q8H    isavuconazonium sulfate  372 mg Oral Daily    lidocaine  1 patch Transdermal Q24H    mirtazapine  7.5 mg Oral QHS    pantoprazole  40 mg Oral Daily    predniSONE  2.5 mg Oral Daily    sulfamethoxazole-trimethoprim 400-80mg  1 tablet Oral Every Mon, Wed, Fri    tacrolimus  2 mg Oral BID    ursodiol  300 mg Oral BID    valganciclovir 50 mg/ml  100 mg Oral Once per day on Mon Wed Fri       PRN Medications: acetaminophen, albuterol-ipratropium, artificial tears, bacitracin, bisacodyl, dextrose 50%, dextrose 50%, glucagon (human recombinant), glucose, glucose, heparin (porcine), midodrine, ondansetron, ramelteon, tiZANidine, traMADol    Family History     Unknown per patient.         Tobacco Use    Smoking status: Never Smoker   Substance and Sexual Activity    Alcohol use: Not on file    Drug use: Not on file    Sexual activity: Not on file     Review of Systems   Constitutional: Positive for activity change and fatigue. Negative for fever.   HENT: Positive for trouble swallowing. Negative for voice change.    Eyes: Negative for photophobia and visual disturbance.   Respiratory: Negative for cough and shortness of breath.    Cardiovascular: Negative for chest pain and palpitations.   Gastrointestinal: Positive for abdominal distention, abdominal pain, nausea and vomiting. Negative for diarrhea.   Genitourinary: Negative for difficulty urinating and  flank pain.   Musculoskeletal: Positive for gait problem. Negative for arthralgias.   Skin: Positive for wound (surgical). Negative for color change.   Neurological: Positive for dizziness and weakness.   Psychiatric/Behavioral: Negative for agitation and confusion.     Objective:     Vital Signs (Most Recent):  Temp: 97.7 °F (36.5 °C) (12/20/18 1111)  Pulse: 92 (12/20/18 1211)  Resp: (!) 29 (12/20/18 1211)  BP: 126/86 (12/20/18 1211)  SpO2: 96 % (12/20/18 1211)    Vital Signs (24h Range):  Temp:  [97.7 °F (36.5 °C)-98.2 °F (36.8 °C)] 97.7 °F (36.5 °C)  Pulse:  [84-96] 92  Resp:  [16-94] 29  SpO2:  [96 %-99 %] 96 %  BP: ()/(48-99) 126/86     Body mass index is 23.03 kg/m².    Physical Exam   Constitutional: He is oriented to person, place, and time. He appears well-developed.   very thin    HENT:   Head: Normocephalic and atraumatic.   Eyes: Right eye exhibits no discharge. Left eye exhibits no discharge. No scleral icterus.   Neck: Neck supple.   Cardiovascular: Normal rate and intact distal pulses.   Pulmonary/Chest: Effort normal. No respiratory distress.   Abdominal: Soft. He exhibits no distension. There is no tenderness.   Musculoskeletal: Normal range of motion. He exhibits no edema or deformity.   Generalized deconditioning    Neurological: He is alert and oriented to person, place, and time. No sensory deficit.   Follows commands    Skin: Skin is warm and dry.   Psychiatric: He has a normal mood and affect. His behavior is normal.       Diagnostic Results:   Labs: Reviewed  ECG: Reviewed  X-Ray: Reviewed  US: Reviewed  CT: Reviewed    Assessment/Plan:     * S/P liver transplant    -s/p liver trx on 11/11 for alcoholic cirrhosis       Severe malnutrition    -see decreased appetite     Decreased appetite    -SLP re-eval for swallowing pending for 12/20  -previously on regular diet and thin liquids from 11/27  -also complaining of nausea, trouble swallowing, & abd pain while eating        Acute renal  failure with acute tubular necrosis superimposed on stage 3 chronic kidney disease    -Cr. 2.0  -on HD     Intra-abdominal abscess    -s/p drain   -IV abx completed     Debility    -2/2 liver trx with deconditioning     See hospital course for functional, cognitive/speech/language, and nutrition/swallow status.      Recommendations  -  Encourage mobility, OOB in chair at least 3 hours per day, and early ambulation as appropriate  -  PT/OT evaluate and treat  -  Pain management  -  Monitor for and prevent skin breakdown and pressure ulcers  · Early mobility, repositioning/weight shifting every 20-30 minutes when sitting, turn patient every 2 hours, proper mattress/overlay and chair cushioning, pressure relief/heel protector boots  -  Reviewed discharge options (IP rehab, SNF, HH therapy, and OP therapy)       Pleural effusion associated with hepatic disorder    -last CXR on 12/19 with small left pleural effusion may be present  -on RA  -completed IV abx     Participating with therapy. SLP BSS re-evaluation pending. Will follow progress and discuss with rehab team for post acute care/rehab recommendation.      Thank you for your consult.     Mela Scanlon NP  Department of Physical Medicine & Rehab  [facility]

## 2018-12-21 ENCOUNTER — ANESTHESIA EVENT (OUTPATIENT)
Dept: ENDOSCOPY | Facility: HOSPITAL | Age: 53
DRG: 005 | End: 2018-12-21
Payer: COMMERCIAL

## 2018-12-21 PROBLEM — R07.9 CHEST PAIN: Status: ACTIVE | Noted: 2018-12-21

## 2018-12-21 PROBLEM — E83.39 HYPOPHOSPHATEMIA: Status: ACTIVE | Noted: 2018-12-21

## 2018-12-21 LAB
ALBUMIN SERPL BCP-MCNC: 2.3 G/DL
ALP SERPL-CCNC: 159 U/L
ALT SERPL W/O P-5'-P-CCNC: 10 U/L
ANION GAP SERPL CALC-SCNC: 8 MMOL/L
AST SERPL-CCNC: 10 U/L
BASOPHILS # BLD AUTO: 0.31 K/UL
BASOPHILS NFR BLD: 2.4 %
BILIRUB SERPL-MCNC: 1.4 MG/DL
BUN SERPL-MCNC: 12 MG/DL
CALCIUM SERPL-MCNC: 9 MG/DL
CHLORIDE SERPL-SCNC: 106 MMOL/L
CO2 SERPL-SCNC: 24 MMOL/L
CREAT SERPL-MCNC: 2.7 MG/DL
DIFFERENTIAL METHOD: ABNORMAL
EOSINOPHIL # BLD AUTO: 0.7 K/UL
EOSINOPHIL NFR BLD: 5.7 %
ERYTHROCYTE [DISTWIDTH] IN BLOOD BY AUTOMATED COUNT: 15.8 %
EST. GFR  (AFRICAN AMERICAN): 29.8 ML/MIN/1.73 M^2
EST. GFR  (NON AFRICAN AMERICAN): 25.7 ML/MIN/1.73 M^2
GLUCOSE SERPL-MCNC: 96 MG/DL
HCT VFR BLD AUTO: 30.7 %
HGB BLD-MCNC: 9 G/DL
IMM GRANULOCYTES # BLD AUTO: 0.33 K/UL
IMM GRANULOCYTES NFR BLD AUTO: 2.5 %
LYMPHOCYTES # BLD AUTO: 0.9 K/UL
LYMPHOCYTES NFR BLD: 6.5 %
MAGNESIUM SERPL-MCNC: 1.5 MG/DL
MCH RBC QN AUTO: 28.8 PG
MCHC RBC AUTO-ENTMCNC: 29.3 G/DL
MCV RBC AUTO: 98 FL
MONOCYTES # BLD AUTO: 1.8 K/UL
MONOCYTES NFR BLD: 13.5 %
NEUTROPHILS # BLD AUTO: 9 K/UL
NEUTROPHILS NFR BLD: 69.4 %
NRBC BLD-RTO: 0 /100 WBC
PHOSPHATE SERPL-MCNC: 2.3 MG/DL
PLATELET # BLD AUTO: 381 K/UL
PMV BLD AUTO: 11 FL
POCT GLUCOSE: 82 MG/DL (ref 70–110)
POCT GLUCOSE: 92 MG/DL (ref 70–110)
POCT GLUCOSE: 96 MG/DL (ref 70–110)
POCT GLUCOSE: 99 MG/DL (ref 70–110)
POTASSIUM SERPL-SCNC: 4 MMOL/L
PROT SERPL-MCNC: 5.9 G/DL
RBC # BLD AUTO: 3.13 M/UL
SODIUM SERPL-SCNC: 138 MMOL/L
TACROLIMUS BLD-MCNC: 7.8 NG/ML
TROPONIN I SERPL DL<=0.01 NG/ML-MCNC: 0.01 NG/ML
WBC # BLD AUTO: 13 K/UL

## 2018-12-21 PROCEDURE — 63600175 PHARM REV CODE 636 W HCPCS: Performed by: NURSE PRACTITIONER

## 2018-12-21 PROCEDURE — 83735 ASSAY OF MAGNESIUM: CPT

## 2018-12-21 PROCEDURE — 25000003 PHARM REV CODE 250: Performed by: NURSE PRACTITIONER

## 2018-12-21 PROCEDURE — 97530 THERAPEUTIC ACTIVITIES: CPT

## 2018-12-21 PROCEDURE — 63600175 PHARM REV CODE 636 W HCPCS: Mod: JG | Performed by: INTERNAL MEDICINE

## 2018-12-21 PROCEDURE — 36415 COLL VENOUS BLD VENIPUNCTURE: CPT

## 2018-12-21 PROCEDURE — 63600175 PHARM REV CODE 636 W HCPCS: Performed by: PHYSICIAN ASSISTANT

## 2018-12-21 PROCEDURE — 99223 1ST HOSP IP/OBS HIGH 75: CPT | Mod: ,,, | Performed by: INTERNAL MEDICINE

## 2018-12-21 PROCEDURE — 90935 HEMODIALYSIS ONE EVALUATION: CPT

## 2018-12-21 PROCEDURE — 63600175 PHARM REV CODE 636 W HCPCS: Performed by: TRANSPLANT SURGERY

## 2018-12-21 PROCEDURE — 90937 PR HEMODIALYSIS, REPEATED EVAL.: ICD-10-PCS | Mod: ,,, | Performed by: INTERNAL MEDICINE

## 2018-12-21 PROCEDURE — 63600175 PHARM REV CODE 636 W HCPCS: Performed by: STUDENT IN AN ORGANIZED HEALTH CARE EDUCATION/TRAINING PROGRAM

## 2018-12-21 PROCEDURE — 99233 PR SUBSEQUENT HOSPITAL CARE,LEVL III: ICD-10-PCS | Mod: 24,,, | Performed by: PHYSICIAN ASSISTANT

## 2018-12-21 PROCEDURE — 99900035 HC TECH TIME PER 15 MIN (STAT)

## 2018-12-21 PROCEDURE — 94664 DEMO&/EVAL PT USE INHALER: CPT

## 2018-12-21 PROCEDURE — 25000003 PHARM REV CODE 250: Performed by: PHYSICIAN ASSISTANT

## 2018-12-21 PROCEDURE — 97110 THERAPEUTIC EXERCISES: CPT

## 2018-12-21 PROCEDURE — 25000003 PHARM REV CODE 250: Performed by: STUDENT IN AN ORGANIZED HEALTH CARE EDUCATION/TRAINING PROGRAM

## 2018-12-21 PROCEDURE — 80197 ASSAY OF TACROLIMUS: CPT

## 2018-12-21 PROCEDURE — 99232 PR SUBSEQUENT HOSPITAL CARE,LEVL II: ICD-10-PCS | Mod: ,,, | Performed by: NURSE PRACTITIONER

## 2018-12-21 PROCEDURE — 90937 HEMODIALYSIS REPEATED EVAL: CPT | Mod: ,,, | Performed by: INTERNAL MEDICINE

## 2018-12-21 PROCEDURE — 99233 SBSQ HOSP IP/OBS HIGH 50: CPT | Mod: 24,,, | Performed by: PHYSICIAN ASSISTANT

## 2018-12-21 PROCEDURE — 84100 ASSAY OF PHOSPHORUS: CPT

## 2018-12-21 PROCEDURE — 85025 COMPLETE CBC W/AUTO DIFF WBC: CPT

## 2018-12-21 PROCEDURE — 84484 ASSAY OF TROPONIN QUANT: CPT

## 2018-12-21 PROCEDURE — 96372 THER/PROPH/DIAG INJ SC/IM: CPT

## 2018-12-21 PROCEDURE — 99223 PR INITIAL HOSPITAL CARE,LEVL III: ICD-10-PCS | Mod: ,,, | Performed by: INTERNAL MEDICINE

## 2018-12-21 PROCEDURE — 99232 SBSQ HOSP IP/OBS MODERATE 35: CPT | Mod: ,,, | Performed by: NURSE PRACTITIONER

## 2018-12-21 PROCEDURE — 20600001 HC STEP DOWN PRIVATE ROOM

## 2018-12-21 PROCEDURE — 80053 COMPREHEN METABOLIC PANEL: CPT

## 2018-12-21 RX ORDER — SODIUM CHLORIDE 9 MG/ML
INJECTION, SOLUTION INTRAVENOUS ONCE
Status: DISCONTINUED | OUTPATIENT
Start: 2018-12-21 | End: 2018-12-23

## 2018-12-21 RX ORDER — MAGNESIUM SULFATE HEPTAHYDRATE 40 MG/ML
2 INJECTION, SOLUTION INTRAVENOUS ONCE
Status: COMPLETED | OUTPATIENT
Start: 2018-12-21 | End: 2018-12-21

## 2018-12-21 RX ORDER — SODIUM CHLORIDE 9 MG/ML
INJECTION, SOLUTION INTRAVENOUS
Status: DISCONTINUED | OUTPATIENT
Start: 2018-12-21 | End: 2018-12-23

## 2018-12-21 RX ORDER — PANTOPRAZOLE SODIUM 40 MG/1
40 TABLET, DELAYED RELEASE ORAL
Status: DISCONTINUED | OUTPATIENT
Start: 2018-12-21 | End: 2018-12-25

## 2018-12-21 RX ADMIN — TACROLIMUS 2 MG: 1 CAPSULE ORAL at 06:12

## 2018-12-21 RX ADMIN — HEPARIN SODIUM 1000 UNITS: 1000 INJECTION, SOLUTION INTRAVENOUS; SUBCUTANEOUS at 05:12

## 2018-12-21 RX ADMIN — PREDNISONE 2.5 MG: 2.5 TABLET ORAL at 08:12

## 2018-12-21 RX ADMIN — ESCITALOPRAM OXALATE 20 MG: 10 TABLET ORAL at 08:12

## 2018-12-21 RX ADMIN — HEPARIN SODIUM 5000 UNITS: 5000 INJECTION, SOLUTION INTRAVENOUS; SUBCUTANEOUS at 09:12

## 2018-12-21 RX ADMIN — LIDOCAINE 1 PATCH: 50 PATCH CUTANEOUS at 05:12

## 2018-12-21 RX ADMIN — SULFAMETHOXAZOLE AND TRIMETHOPRIM 1 TABLET: 400; 80 TABLET ORAL at 08:12

## 2018-12-21 RX ADMIN — PANTOPRAZOLE SODIUM 40 MG: 40 TABLET, DELAYED RELEASE ORAL at 08:12

## 2018-12-21 RX ADMIN — ASPIRIN 81 MG CHEWABLE TABLET 81 MG: 81 TABLET CHEWABLE at 08:12

## 2018-12-21 RX ADMIN — PANTOPRAZOLE SODIUM 40 MG: 40 TABLET, DELAYED RELEASE ORAL at 06:12

## 2018-12-21 RX ADMIN — HEPARIN SODIUM 5000 UNITS: 5000 INJECTION, SOLUTION INTRAVENOUS; SUBCUTANEOUS at 05:12

## 2018-12-21 RX ADMIN — MORPHINE 100 MG: 10 SOLUTION ORAL at 09:12

## 2018-12-21 RX ADMIN — ISAVUCONAZONIUM SULFATE 372 MG: 186 CAPSULE ORAL at 10:12

## 2018-12-21 RX ADMIN — URSODIOL 300 MG: 300 CAPSULE ORAL at 08:12

## 2018-12-21 RX ADMIN — MAGNESIUM SULFATE IN WATER 2 G: 40 INJECTION, SOLUTION INTRAVENOUS at 10:12

## 2018-12-21 RX ADMIN — TACROLIMUS 2 MG: 1 CAPSULE ORAL at 08:12

## 2018-12-21 RX ADMIN — TRAMADOL HYDROCHLORIDE 50 MG: 50 TABLET, FILM COATED ORAL at 08:12

## 2018-12-21 RX ADMIN — ERYTHROPOIETIN 4200 UNITS: 10000 INJECTION, SOLUTION INTRAVENOUS; SUBCUTANEOUS at 05:12

## 2018-12-21 RX ADMIN — MIRTAZAPINE 7.5 MG: 7.5 TABLET ORAL at 08:12

## 2018-12-21 NOTE — SUBJECTIVE & OBJECTIVE
Past Medical History:   Diagnosis Date    Liver cirrhosis, alcoholic 09/30/2018       Past Surgical History:   Procedure Laterality Date    EGD (ESOPHAGOGASTRODUODENOSCOPY) N/A 11/6/2018    Performed by Duarte Jules MD at [facility] ENDO (2ND FLR)    INSERTION, CATHETER, CENTRAL VENOUS, HEMODIALYSIS, TUNNELED N/A 11/7/2018    Performed by Brock Gonzalez MD at [facility] CATH LAB    LAPAROTOMY, EXPLORATORY N/A 11/16/2018    Performed by Amadou Lester Jr., MD at [facility] OR 2ND FLR    LAPAROTOMY, EXPLORATORY, AFTER LIVER TRANSPLANT N/A 11/16/2018    Performed by Amadou Lester Jr., MD at [facility] OR 2ND FLR    LAPAROTOMY, EXPLORATORY, AFTER LIVER TRANSPLANT, LIKELY  ABDOMINAL CLOSURE N/A 11/18/2018    Performed by Amadou Lester Jr., MD at [facility] OR 2ND FLR    TRANSPLANT, LIVER N/A 11/11/2018    Performed by Shmuel Leary MD at [facility] OR 2ND FLR       Review of patient's allergies indicates:   Allergen Reactions    Penicillins Nausea And Vomiting and Rash     Full body rash from cefepime as well     Family History     None        Tobacco Use    Smoking status: Never Smoker   Substance and Sexual Activity    Alcohol use: Not on file    Drug use: Not on file    Sexual activity: Not on file     Review of Systems   Constitutional: Positive for appetite change. Negative for activity change, chills, fatigue and fever.   HENT: Negative for mouth sores, sore throat and trouble swallowing.    Eyes: Negative for visual disturbance.   Respiratory: Negative for chest tightness and shortness of breath.    Cardiovascular: Negative for chest pain.   Gastrointestinal: Positive for nausea and vomiting. Negative for abdominal pain, anal bleeding, blood in stool, constipation and diarrhea.   Genitourinary: Negative for dysuria.   Musculoskeletal: Negative for arthralgias and myalgias.   Skin: Negative for rash.   Neurological: Negative for dizziness and light-headedness.   Psychiatric/Behavioral: Negative for agitation and  confusion.     Objective:     Vital Signs (Most Recent):  Temp: 97.5 °F (36.4 °C) (12/21/18 1149)  Pulse: 90 (12/21/18 1145)  Resp: (!) 24 (12/21/18 1145)  BP: (!) 159/97 (12/21/18 1145)  SpO2: 98 % (12/21/18 1145) Vital Signs (24h Range):  Temp:  [97.5 °F (36.4 °C)-98.4 °F (36.9 °C)] 97.5 °F (36.4 °C)  Pulse:  [85-90] 90  Resp:  [17-31] 24  SpO2:  [96 %-98 %] 98 %  BP: (136-170)/() 159/97     Weight: 72.8 kg (160 lb 7.9 oz) (12/20/18 0544)  Body mass index is 23.03 kg/m².      Intake/Output Summary (Last 24 hours) at 12/21/2018 1321  Last data filed at 12/21/2018 1300  Gross per 24 hour   Intake 260 ml   Output 15 ml   Net 245 ml       Lines/Drains/Airways     Central Venous Catheter Line                 Tunneled Central Line Insertion/Assessment - Double Lumen  11/07/18 1350 right internal jugular 43 days          Peripheral Intravenous Line                 Midline Catheter Insertion/Assessment  - Single Lumen 12/03/18 1248 Right basilic vein (medial side of arm) 18g x 8cm 18 days                Physical Exam   Constitutional: He is oriented to person, place, and time. No distress.   Frail and cachectic appearing but no distress.   HENT:   Head: Normocephalic and atraumatic.   Mouth/Throat: No oropharyngeal exudate.   No oral lesions or thrush   Eyes: Conjunctivae are normal. No scleral icterus.   Cardiovascular: Normal rate, regular rhythm, normal heart sounds and intact distal pulses.   Pulmonary/Chest: Effort normal and breath sounds normal. No respiratory distress.   Abdominal: Soft. Bowel sounds are normal. He exhibits no distension and no mass. There is no tenderness. There is no rebound and no guarding.   Well healing, intact and clean chevron incision.   Musculoskeletal: He exhibits no edema or tenderness.   Lymphadenopathy:     He has no cervical adenopathy.   Neurological: He is alert and oriented to person, place, and time.   Skin: Skin is warm. Capillary refill takes less than 2 seconds. He is  not diaphoretic.   Psychiatric: He has a normal mood and affect. His behavior is normal. Judgment and thought content normal.   Nursing note and vitals reviewed.      Significant Labs:  CBC:   Recent Labs   Lab 12/20/18  0633 12/21/18  0647   WBC 13.20* 13.00*   HGB 9.3* 9.0*   HCT 30.7* 30.7*   * 381*     CMP:   Recent Labs   Lab 12/21/18  0647   GLU 96   CALCIUM 9.0   ALBUMIN 2.3*   PROT 5.9*      K 4.0   CO2 24      BUN 12   CREATININE 2.7*   ALKPHOS 159*   ALT 10   AST 10   BILITOT 1.4*     Coagulation: No results for input(s): PT, INR, APTT in the last 48 hours.    Significant Imaging:  Imaging results within the past 24 hours have been reviewed.

## 2018-12-21 NOTE — ASSESSMENT & PLAN NOTE
- Continue appetite stimulant.  - GI consulted as pt c/o burning sensation in chest/esophagus + vomiting after eating, passed SLP eval, recommended GI f/u.  - EGD planned for Monday, 12/24.

## 2018-12-21 NOTE — PT/OT/SLP PROGRESS
"Physical Therapy Treatment    Patient Name:  Femi Enciso   MRN:  [id number]    Recommendations:     Discharge Recommendations:  nursing facility, skilled   Discharge Equipment Recommendations: (TBD)   Barriers to discharge: Inaccessible home and Decreased caregiver support    Assessment:     Femi Enciso is a 53 y.o. male admitted with a medical diagnosis of S/P liver transplant.  He presents with the following impairments/functional limitations:  weakness, impaired functional mobilty, impaired endurance, gait instability, impaired balance, decreased lower extremity function, impaired self care skills, decreased upper extremity function, pain, impaired cardiopulmonary response to activity, decreased safety awareness.  Pt tolerated session c mod c/o dizziness and "blacking out."  Pt continues to require max encouragement for participation and anxious throughout session.  Performed bed mobility c min A and transfer c mod A.  Pt took ~3 steps to transfer to chair c mod A and HHAx2.  Pt c c/o BLE "giving out" throughout transfer but did not exhibit any deficits.  Additional transfer and standing training deferred on this date d/t pt's c/o increased dizziness and states he was "blacking out."  Pt would benefit from continued skilled acute PT 4x/wk to improve functional mobility.      Rehab Prognosis: Good; patient would benefit from acute skilled PT services to address these deficits and reach maximum level of function.    Recent Surgery: Procedure(s) (LRB):  LAPAROTOMY, EXPLORATORY, AFTER LIVER TRANSPLANT, LIKELY  ABDOMINAL CLOSURE (N/A) 33 Days Post-Op    Plan:     During this hospitalization, patient to be seen 4 x/week to address the identified rehab impairments via gait training, therapeutic activities, therapeutic exercises, neuromuscular re-education and progress toward the following goals:    · Plan of Care Expires:  01/17/19    Subjective     Chief Complaint: dizziness; "blacking out."  Patient/Family Comments/goals: " ""I'm still dizzy. Blacking out." while sitting EOB; "Everything is going back." - following transfer to chair, no other signs of distress noted  Pain/Comfort:  · Pain Rating 1: ("it's dull.")  · Location - Side 1: Right  · Location - Orientation 1: generalized  · Location 1: flank  · Pain Addressed 1: Reposition, Distraction      Objective:     Communicated with RN prior to session.  Patient found HOB elevated and mother present telemetry, peripheral IV  upon PT entry to room.     General Precautions: Standard, fall   Orthopedic Precautions:N/A   Braces: N/A     Functional Mobility:  · Bed Mobility:     · Rolling Right: minimum assistance  · Scooting: minimum assistance  · Supine to Sit: minimum assistance  · Transfers:     · Sit to Stand:  moderate assistance with hand-held assist  · Bed to Chair: moderate assistance with  hand-held assist  using  Step Transfer  · Gait: ~3 steps to bedside chair c HHAx2 mod A  · Decreased B toe/floor clearance, unsteady, c/o BLE "giving out" but did not demo any deficits, mild posterior lean  · Balance: sitting (S); standing (mod A)      AM-PAC 6 CLICK MOBILITY  Turning over in bed (including adjusting bedclothes, sheets and blankets)?: 3  Sitting down on and standing up from a chair with arms (e.g., wheelchair, bedside commode, etc.): 2  Moving from lying on back to sitting on the side of the bed?: 3  Moving to and from a bed to a chair (including a wheelchair)?: 2  Need to walk in hospital room?: 2  Climbing 3-5 steps with a railing?: 1  Basic Mobility Total Score: 13       Therapeutic Activities and Exercises:  Pt educated on: PT role/POC; safety c mobility; benefits of OOB activities; performing therex; d/c recs - v/u  -Sat EOB x10 mins - able to accept some challenges  -Sit<>stand: 2x from bed; 1x from bedside chair  -Static standing 2x~30secs  -Therex (AP ,LAQ, hip flex, sit ups, shoulder press) 2x10 ea (difficulty c sit up from chair and hip flexion unable to perform 10 " consecutive therefore performed [id number])    Patient left up in chair with all lines intact, call button in reach, RN notified and wife and mother present..    GOALS:   Multidisciplinary Problems     Physical Therapy Goals        Problem: Physical Therapy Goal    Goal Priority Disciplines Outcome Goal Variances Interventions   Physical Therapy Goal     PT, PT/OT Ongoing (interventions implemented as appropriate)     Description:  Goals to be met by: 2018     Patient will increase functional independence with mobility by performin. Supine to sit with Modified [place] -not met  2. Sit to stand transfer with Pol[place] -not met  3. Bed to chair transfer with independence using no AD -not met  4. Gait  x150 feet with Supervision using LRAD. -not met  5. Lower extremity exercise program x20 reps per handout, with independence -not met                     Multidisciplinary Problems (Resolved)        Problem: Physical Therapy Goal    Goal Priority Disciplines Outcome Goal Variances Interventions   Physical Therapy Goal   (Resolved)     PT, PT/OT Resolved for Upgrade                     Time Tracking:     PT Received On: 18  PT Start Time: 1012     PT Stop Time: 1039  PT Total Time (min): 27 min     Billable Minutes: Therapeutic Activity 12 min and Therapeutic Exercise 12 min    Treatment Type: Treatment  PT/PTA: PT     PTA Visit Number: 0     Percy Coleman, PT  2018

## 2018-12-21 NOTE — PROGRESS NOTES
[facility]FF HEMODIALYSIS NOTE     Patient currently on hemodialysis for removal of uremic toxins and volume.    Patient seen and evaluated on hemodialysis, tolerating treatment, see HD flowsheet for vitals and assessments.      Ultrafiltration goal is 1-2 L as tolerated     Labs have been reviewed and the dialysate bath has been adjusted.     Assessment/Plan:     HD today for removal of uremic toxins and volume.  Patient is making some urine  Would bladder scan routinely, consider ua Uc/s with increased WBC

## 2018-12-21 NOTE — PLAN OF CARE
Problem: Patient Care Overview  Goal: Plan of Care Review  Pt AAOx4, bed low locked, call light within reach, wearing nonskid socks. Pt instructed to use call light for assistance, pt verbalizes understanding. Pt complains of back pain, PRN pain meds x 1, no further complaints. AC/HS, 103, no coverage needed. Encouraged PO intake, pt ate a few bites of jello. [employer] ECTOR with steri strips, CDI no SSI. Pt remains free from injury/harm at this time. Afebrile. See flowsheet for full assessment/VS.

## 2018-12-21 NOTE — ASSESSMENT & PLAN NOTE
"- Pt c/o "burning" diffuse CP 12/21 AM  - EKG NSR, stable from prior  - Troponin wnl  - Likely GI related pain. GI consulted, EGD planned for 12/24.    "

## 2018-12-21 NOTE — SUBJECTIVE & OBJECTIVE
Interval History 12/21/2018:  Patient is seen for follow-up rehab evaluation and recommendations: Nutritional status pending.     HPI, Past Medical, Family, and Social History remains the same as documented in the initial encounter.    Scheduled Medications:    aspirin  81 mg Oral Daily    epoetin sherlyn (PROCRIT) injection  50 Units/kg (Dosing Weight) Intravenous Every Mon, Wed, Fri    ergocalciferol  50,000 Units Oral Q7 Days    escitalopram oxalate  20 mg Oral Nightly    heparin (porcine)  5,000 Units Subcutaneous Q8H    isavuconazonium sulfate  372 mg Oral Daily    lidocaine  1 patch Transdermal Q24H    magnesium sulfate IVPB  2 g Intravenous Once    mirtazapine  7.5 mg Oral QHS    pantoprazole  40 mg Oral BID AC    predniSONE  2.5 mg Oral Daily    sulfamethoxazole-trimethoprim 400-80mg  1 tablet Oral Every Mon, Wed, Fri    tacrolimus  2 mg Oral BID    ursodiol  300 mg Oral BID    valganciclovir 50 mg/ml  100 mg Oral Once per day on Mon Wed Fri       Diagnostic Results: Labs: Reviewed    PRN Medications: acetaminophen, albuterol-ipratropium, artificial tears, bacitracin, bisacodyl, dextrose 50%, dextrose 50%, glucagon (human recombinant), glucose, glucose, heparin (porcine), midodrine, ondansetron, ramelteon, tiZANidine, traMADol    Review of Systems   Constitutional: Positive for activity change and fatigue. Negative for fever.   HENT: Negative for trouble swallowing and voice change.    Respiratory: Negative for cough and shortness of breath.    Cardiovascular: Negative for chest pain and palpitations.   Gastrointestinal: Positive for abdominal distention, abdominal pain and nausea. Negative for diarrhea.   Musculoskeletal: Positive for gait problem. Negative for arthralgias.   Skin: Positive for wound (surgical). Negative for color change.   Neurological: Positive for dizziness and weakness.     Objective:     Vital Signs (Most Recent):  Temp: 98.3 °F (36.8 °C) (12/21/18 0802)  Pulse: 86  (12/21/18 0802)  Resp: (!) 31 (12/21/18 0802)  BP: (!) 170/104 (12/21/18 0802)  SpO2: 97 % (12/21/18 0802)    Vital Signs (24h Range):  Temp:  [98.3 °F (36.8 °C)-98.4 °F (36.9 °C)] 98.3 °F (36.8 °C)  Pulse:  [85-92] 86  Resp:  [17-31] 31  SpO2:  [96 %-98 %] 97 %  BP: (126-170)/() 170/104     Physical Exam   Constitutional: He is oriented to person, place, and time. He appears well-developed.   very thin    HENT:   Head: Normocephalic and atraumatic.   Eyes: Right eye exhibits no discharge. Left eye exhibits no discharge. No scleral icterus.   Neck: Neck supple.   Cardiovascular: Normal rate and intact distal pulses.   Pulmonary/Chest: Effort normal. No respiratory distress.   Abdominal: Soft. He exhibits distension.   Musculoskeletal: Normal range of motion. He exhibits no edema or deformity.   Generalized deconditioning    Neurological: He is alert and oriented to person, place, and time. No sensory deficit.   Follows commands    Skin: Skin is warm and dry.   Psychiatric: He has a normal mood and affect. His behavior is normal.     NEUROLOGICAL EXAMINATION:     MENTAL STATUS   Oriented to person, place, and time.

## 2018-12-21 NOTE — H&P (VIEW-ONLY)
[facility]  Gastroenterology  Consult Note    Patient Name: Femi Enciso  MRN: [id number]  Admission Date: 10/27/2018  Hospital Length of Stay: 55 days  Code Status: Full Code   Attending Provider: Femi Patel MD   Consulting Provider: Percy Dhillon MD  Primary Care Physician: Primary Doctor No  Principal Problem:S/P liver transplant    Inpatient consult to Gastroenterology  Consult performed by: Percy Dhillon MD  Consult ordered by: Therese Vu PA-C        Subjective:     HPI:  Mr Enciso is a 53 year old man with history of ETOH ESLD s/p OLT, ESRD, s/p OLT 11/11, GI consulted for consideration of EGD.    HPI per team  Femi Enciso is a 53yr old male with PMH of acute ETOH hepatitis and DEL/ATN evolved to ESRD requiring HD (not candidate for KTX). He is now s/p liver transplant (SM induction, DBD, CMV D+/R-). Transplant surgery noted severe collateralization, adrenal gland firmly adhered to liver. Bare area of adrenal gland required several stitches and packing to obtain fair hemostasis (EBL 15L). OR take back 11/16 for bleeding from R adrenal bed in AM (significant clot in retroperitoneum, posterior to R hepatic lobe, tract posterior to the R kidney containing significant clot, small bleeding area of retroperitoneal fat) then returned to surgery same day for hemorrhaging closed with wound vac with plans to return to OR 24-48 hours for closure (retroperitoneal /retrorenal/retrocolic hematoma on the right ). Raw retroperitoneal bleeding. Suture ligatures placed, argon beam cautery, evarest placed in retroperitoneum behind cava and right kidney. Significant oozing from upper right adrenal gland, not amenable to ligation, that portion of the adrenal gland was resected with cautery). OR take back 11/18 for washout and incisional closure, no significant bleeding or hematoma found. OR cultures 11/18  from body fluid grew enterococcus faecium VRE. ID was consulted, 11/21 started on daptomycin for  VRE treatment and cefepime/flagyl to cover for IA ty in bile. Patient with significant leukocytosis with peak on 11/22 at 48K prompting drainage of R subphrenic fluid collection, perc drain placed, culture grew VRE. Stroke code called 11/21 for lethargy and unresponsive, CT head without evidence of acute ischemic changes and CTA chest negative, vascular neurology was consulted recommended MRI brain, but patient's AMS improved shortly after event. Nephrology consulted for ESRD requiring HD. Patient overall improved on antibiotic regimen, leukocytosis and AMS improved. He was transferred to TSU on POD #15.     Due to concern for poor PO intake GI consulted. He reports that prior to his OLT he was eating without issue. He notes that since his 3rd OR he has had difficulty with tolerating PO. He notes that he has no issues with swallowing or feeling the food becomes stuck or having to vomit up initially. However, he notes that ~20 minutes after eating he develops nausea, sensation of food at lower sternum, followed by vomiting. Able to tolerate liquid with minimal nausea but happens frequently with solid intake. He denies any abdominal pain. Denies constipation, endorsing q2 day BM, including normal BM today. Denies any odynophagia or dysphagia. Denies any other complaints on review.    He was seen by SLP who passed him without any evidence of aspiration or pharyngeal dysphagia but recommended GI testing due to patient's complaints.    Prior Endo Hx  EGD. (11/6/18). Dr. Jules. Indication:Variceal screening.  - Normal examined duodenum.  - Portal hypertensive gastropathy.   - Large (> 5 mm) esophageal varices. Completely eradicated. Banded.              Past Medical History:   Diagnosis Date    Liver cirrhosis, alcoholic 09/30/2018       Past Surgical History:   Procedure Laterality Date    EGD (ESOPHAGOGASTRODUODENOSCOPY) N/A 11/6/2018    Performed by Du[facility]ules [facility] at [facility] ENDO (2ND FLR)    INSERTION, CATHETER,  CENTRAL VENOUS, HEMODIALYSIS, TUNNELED N/A 11/7/2018    Performed by Brock Gonzalez MD at [facility] CATH LAB    LAPAROTOMY, EXPLORATORY N/A 11/16/2018    Performed by Amadou Lester Jr., MD at [facility] OR 2ND FLR    LAPAROTOMY, EXPLORATORY, AFTER LIVER TRANSPLANT N/A 11/16/2018    Performed by Amadou Lester Jr., MD at [facility] OR 2ND FLR    LAPAROTOMY, EXPLORATORY, AFTER LIVER TRANSPLANT, LIKELY  ABDOMINAL CLOSURE N/A 11/18/2018    Performed by Amadou Lester Jr., MD at [facility] OR 2ND FLR    TRANSPLANT, LIVER N/A 11/11/2018    Performed by Shmuel Leary MD at [facility] OR [facility] FLR       Review of patient's allergies indicates:   Allergen Reactions    Penicillins Nausea And Vomiting and Rash     Full body rash from cefepime as well     Family History     None        Tobacco Use    Smoking status: Never Smoker   Substance and Sexual Activity    Alcohol use: Not on file    Drug use: Not on file    Sexual activity: Not on file     Review of Systems   Constitutional: Positive for appetite change. Negative for activity change, chills, fatigue and fever.   HENT: Negative for mouth sores, sore throat and trouble swallowing.    Eyes: Negative for visual disturbance.   Respiratory: Negative for chest tightness and shortness of breath.    Cardiovascular: Negative for chest pain.   Gastrointestinal: Positive for nausea and vomiting. Negative for abdominal pain, anal bleeding, blood in stool, constipation and diarrhea.   Genitourinary: Negative for dysuria.   Musculoskeletal: Negative for arthralgias and myalgias.   Skin: Negative for rash.   Neurological: Negative for dizziness and light-headedness.   Psychiatric/Behavioral: Negative for agitation and confusion.     Objective:     Vital Signs (Most Recent):  Temp: 97.5 °F (36.4 °C) (12/21/18 1149)  Pulse: 90 (12/21/18 1145)  Resp: (!) 24 (12/21/18 1145)  BP: (!) 159/97 (12/21/18 1145)  SpO2: 98 % (12/21/18 1145) Vital Signs (24h Range):  Temp:  [97.5 °F (36.4  °C)-98.4 °F (36.9 °C)] 97.5 °F (36.4 °C)  Pulse:  [85-90] 90  Resp:  [17-31] 24  SpO2:  [96 %-98 %] 98 %  BP: (136-170)/() 159/97     Weight: 72.8 kg (160 lb 7.9 oz) (12/20/18 0544)  Body mass index is 23.03 kg/m².      Intake/Output Summary (Last 24 hours) at 12/21/2018 1321  Last data filed at 12/21/2018 1300  Gross per 24 hour   Intake 260 ml   Output 15 ml   Net 245 ml       Lines/Drains/Airways     Central Venous Catheter Line                 Tunneled Central Line Insertion/Assessment - Double Lumen  11/07/18 1350 right internal jugular 43 days          Peripheral Intravenous Line                 Midline Catheter Insertion/Assessment  - Single Lumen 12/03/18 1248 Right basilic vein (medial side of arm) 18g x 8cm 18 days                Physical Exam   Constitutional: He is oriented to person, place, and time. No distress.   Frail and cachectic appearing but no distress.   HENT:   Head: Normocephalic and atraumatic.   Mouth/Throat: No oropharyngeal exudate.   No oral lesions or thrush   Eyes: Conjunctivae are normal. No scleral icterus.   Cardiovascular: Normal rate, regular rhythm, normal heart sounds and intact distal pulses.   Pulmonary/Chest: Effort normal and breath sounds normal. No respiratory distress.   Abdominal: Soft. Bowel sounds are normal. He exhibits no distension and no mass. There is no tenderness. There is no rebound and no guarding.   Well healing, intact and clean chevron incision.   Musculoskeletal: He exhibits no edema or tenderness.   Lymphadenopathy:     He has no cervical adenopathy.   Neurological: He is alert and oriented to person, place, and time.   Skin: Skin is warm. Capillary refill takes less than 2 seconds. He is not diaphoretic.   Psychiatric: He has a normal mood and affect. His behavior is normal. Judgment and thought content normal.   Nursing note and vitals reviewed.      Significant Labs:  CBC:   Recent Labs   Lab 12/20/18  0633 12/21/18  0647   WBC 13.20* 13.00*    HGB 9.3* 9.0*   HCT 30.7* 30.7*   * 381*     CMP:   Recent Labs   Lab 12/21/18  0647   GLU 96   CALCIUM 9.0   ALBUMIN 2.3*   PROT 5.9*      K 4.0   CO2 24      BUN 12   CREATININE 2.7*   ALKPHOS 159*   ALT 10   AST 10   BILITOT 1.4*     Coagulation: No results for input(s): PT, INR, APTT in the last 48 hours.    Significant Imaging:  Imaging results within the past 24 hours have been reviewed.    Assessment/Plan:     Decreased appetite    Mr Enciso is a 53 year old man with history of ETOH ESLD s/p OLT, ESRD, s/p OLT 11/11, GI consulted for consideration of EGD.    Prior negative EGD 11/2018, endorses symptoms onset since most recent surgery and involve nausea with solid intake followed by vomitus. Passed SLP. Denies any dysphagia or odynophagia or abdominal pain.    Symptoms could be due to esophagitis/gastritis or stress ulceration in setting of illness and multiple surgeries. Additionally could be infectious such as esohpageal candidiasis though symptoms are not completely consistent.    Plan  - NPO Gerry night  - EGD on Monday  - protonix 40mg BID  - can trial GI cocktail  - plan discussed with primary team           Thank you for your consult. I will follow-up with patient. Please contact us if you have any additional questions.    Percy Dhillon MD  Gastroenterology  [facility]

## 2018-12-21 NOTE — ASSESSMENT & PLAN NOTE
-SLP stephanie 12/20 regular diet and thin liquids  -also complaining of nausea & abd pain while eating   -may benefit from GI consult with alternate route of nutrition

## 2018-12-21 NOTE — ASSESSMENT & PLAN NOTE
- c/o increased pain w deep breath today to right side.  CXR showed increased opacity in the inferior hemothorax on the right side since 11/26/2018.  Pulse ox 97-99% on RA.  Cont to monitor.   - continues to have fluid to RLL.  WBC remains elevated.    - thoracentesis performed on 11/30. Fluid noted with 4k WBC, 93 SEGS. cx no growth to date.  Cefepime added for treatment.  - Chest CT showing right pleural effusion improved, left w increased pleural effusion.   - Per ID, completed treatment of empyema w Cefepime thru 12/8.  - sats remain 97-99% on RA.  - CXR 12/20 showed left hemidiaphragmatic margin that is better delineated than on 12/19/2018, consistent with improved aeration in the left lower lobe. + no significant detrimental interval change in the appearance of the chest, with the current examination demonstrating a slightly improved inspiratory depth level.

## 2018-12-21 NOTE — ASSESSMENT & PLAN NOTE
- LFTs now improving slowly.  T bili was 37.6 day of transplant.  - Drain placed during take back. Drain placed to intra-abdominal abscess on 11/22.   - Fluid from collection noted with enterococcus VRE completed Zyvox treatment.  - Routine month 1 Liver US obtained 12/17 which showed elevated velocity of hepatic artery and low RIs.   - Vascular Surgery consulted. Recommend repeat US in 10-14 days (12/27-12/31) to reassess. Appreciate Vascular assistance.

## 2018-12-21 NOTE — ANESTHESIA PREPROCEDURE EVALUATION
[facility]  Anesthesia Pre-Operative Evaluation         Patient Name: Femi Enciso  YOB: 1965  MRN: [id number]    SUBJECTIVE:     Pre-operative evaluation for Procedure(s) (LRB):  EGD (ESOPHAGOGASTRODUODENOSCOPY) (N/A)     12/21/2018    Femi Enciso is a 53 y.o. male w/ a significant PMHx of ETOH ESLD s/p OLT, ESRD, s/p OLT 11/11.   Patient has had long complicated post-op course, in summary:   OR take back 11/16 for bleeding from R adrenal bed then returned to surgery same day for hemorrhaging, closed with wound vac, return to OR 24-48 hours later for closure for retroperitoneal  hematoma. OR take back 11/18 for washout and incisional closure, no significant bleeding or hematoma found. OR cultures 11/18  from body fluid grew enterococcus faecium VRE, placed on appropriate Abx. Patient with significant leukocytosis with peak on 11/22 at 48K prompting drainage of R subphrenic fluid collection, perc drain placed, culture grew VRE. Stroke code called 11/21 for lethargy and unresponsive, CT head without evidence of acute ischemic changes and CTA chest negative, vascular neurology was consulted recommended MRI brain, but patient's AMS improved shortly after event. Nephrology consulted for ESRD requiring HD. Patient overall improved on antibiotic regimen, leukocytosis and AMS improved. He was transferred to TSU on POD #15.    Patient reports difficulty with tolerating PO, 20 minutes after eating he develops nausea, sensation of food at lower sternum, followed by vomiting. Able to tolerate liquid with minimal nausea but happens frequently with solid intake. Continues to have regular bowel movements. He was seen by SLP who passed him without any evidence of aspiration or pharyngeal dysphagia but recommended GI testing due to patient's complaints.    Patient now presents for the above procedure(s).    LDA:       Tunneled Central Line Insertion/Assessment - Double Lumen  11/07/18 1350 right  internal jugular (Active)   Dressing biopatch in place;dressing dry and intact 12/21/2018  8:10 AM   IV Device Securement sutures 12/21/2018  8:10 AM   Additional Site Signs no erythema;no warmth;no edema;no pain 12/20/2018  7:30 PM   Distal Patency/Care flushed w/o difficulty;blood return not present 12/19/2018  6:30 PM   Proximal Patency/Care flushed w/o difficulty;blood return not present 12/19/2018  6:30 PM   Waveform other (see comments) 11/26/2018  7:00 AM   Line Interventions line leveled/zeroed 11/21/2018  3:00 AM   Dressing Change Due 12/22/18 12/20/2018  7:30 PM   Daily Line Review Performed 12/19/2018  6:30 PM   Number of days: 43            Midline Catheter Insertion/Assessment  - Single Lumen 12/03/18 1248 Right basilic vein (medial side of arm) 18g x 8cm (Active)   Site Assessment Clean;Dry;Intact 12/21/2018  8:10 AM   IV Device Securement catheter securement device 12/21/2018  8:10 AM   Line Status Saline locked 12/21/2018  8:10 AM   Dressing Status Clean;Dry;Intact;Biopatch in place 12/21/2018  8:10 AM   Dressing Intervention Dressing changed 12/15/2018  5:00 PM   Dressing Change Due 12/22/18 12/21/2018  8:10 AM   Site Change Due 12/31/18 12/21/2018  8:10 AM   Reason Not Rotated Not due 12/21/2018  8:10 AM   Number of days: 18       Prev airway: Easy mask ventilation. Grade II view on DL complicated by anterior larynx.     Drips: None documented.    Patient Active Problem List   Diagnosis    Anemia of chronic disease    Type 2 diabetes mellitus without complication    Pleural effusion associated with hepatic disorder    Debility    S/P liver transplant    Prophylactic immunotherapy    Adrenal cortical steroids causing adverse effect in therapeutic use    Encounter for weaning from ventilator    Overweight (BMI 25.0-29.9)    Anasarca    At risk for opportunistic infections    Long-term use of immunosuppressant medication    Intra-abdominal abscess    Acute renal failure with acute  tubular necrosis superimposed on stage 3 chronic kidney disease    Hyperkalemia    Decreased appetite    Alcohol use disorder, severe, in early remission, dependence    Prolonged Q-T interval on ECG    Anxiety    Severe malnutrition    Hypomagnesemia    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    Acute kidney failure with lesion of tubular necrosis       Review of patient's allergies indicates:   Allergen Reactions    Penicillins Nausea And Vomiting and Rash     Full body rash from cefepime as well       Current Inpatient Medications:   sodium chloride 0.9%   Intravenous Once    aspirin  81 mg Oral Daily    epoetin sherlyn (PROCRIT) injection  50 Units/kg (Dosing Weight) Intravenous Every Mon, Wed, Fri    ergocalciferol  50,000 Units Oral Q7 Days    escitalopram oxalate  20 mg Oral Nightly    heparin (porcine)  5,000 Units Subcutaneous Q8H    isavuconazonium sulfate  372 mg Oral Daily    lidocaine  1 patch Transdermal Q24H    mirtazapine  7.5 mg Oral QHS    pantoprazole  40 mg Oral BID AC    predniSONE  2.5 mg Oral Daily    sulfamethoxazole-trimethoprim 400-80mg  1 tablet Oral Every Mon, Wed, Fri    tacrolimus  2 mg Oral BID    ursodiol  300 mg Oral BID    valganciclovir 50 mg/ml  100 mg Oral Once per day on Mon Wed Fri       No current facility-administered medications on file prior to encounter.      Current Outpatient Medications on File Prior to Encounter   Medication Sig Dispense Refill    calcium carbonate/vitamin D3 (VITAMIN D-3 ORAL) Take 1 tablet by mouth once daily.      cyanocobalamin (VITAMIN B-12) 100 MCG tablet Take 100 mcg by mouth once daily.      folic acid (FOLVITE) 1 MG tablet Take 1 mg by mouth once daily.      lactulose (CHRONULAC) 10 gram/15 mL solution Take 20 g by mouth 3 (three) times daily.      multivitamin (THERAGRAN) per tablet Take 1 tablet by mouth once daily.      omeprazole (PRILOSEC) 40 MG capsule Take 40 mg by mouth once daily.      ondansetron  (ZOFRAN) 4 MG tablet Take 4 mg by mouth daily as needed for Nausea.      rifAXIMin (XIFAXAN) 550 mg Tab Take 550 mg by mouth 2 (two) times daily.         Past Surgical History:   Procedure Laterality Date    EGD (ESOPHAGOGASTRODUODENOSCOPY) N/A 11/6/2018    Performed by Duarte Jules MD at [facility] ENDO (2ND FLR)    INSERTION, CATHETER, CENTRAL VENOUS, HEMODIALYSIS, TUNNELED N/A 11/7/2018    Performed by Brock Gonzalez MD at [facility] CATH LAB    LAPAROTOMY, EXPLORATORY N/A 11/16/2018    Performed by Amadou Lester Jr., MD at [facility] OR 2ND FLR    LAPAROTOMY, EXPLORATORY, AFTER LIVER TRANSPLANT N/A 11/16/2018    Performed by Amadou Lester Jr., MD at [facility] OR 2ND FLR    LAPAROTOMY, EXPLORATORY, AFTER LIVER TRANSPLANT, LIKELY  ABDOMINAL CLOSURE N/A 11/18/2018    Performed by Amadou Lester Jr., MD at [facility] OR 2ND FLR    TRANSPLANT, LIVER N/A 11/11/2018    Performed by Shmuel Leary MD at [facility] OR 2ND FLR       Social History     Socioeconomic History    Marital status:      Spouse name: Not on file    Number of children: Not on file    Years of education: Not on file    Highest education level: Not on file   Social Needs    Financial resource strain: Not on file    Food insecurity - worry: Not on file    Food insecurity - inability: Not on file    Transportation needs - medical: Not on file    Transportation needs - non-medical: Not on file   Occupational History    Not on file   Tobacco Use    Smoking status: Never Smoker   Substance and Sexual Activity    Alcohol use: Not on file    Drug use: Not on file    Sexual activity: Not on file   Other Topics Concern    Not on file   Social History Narrative    Not on file       OBJECTIVE:     Vital Signs Range (Last 24H):  Temp:  [36.4 °C (97.5 °F)-36.9 °C (98.4 °F)]   Pulse:  [85-90]   Resp:  [17-31]   BP: (136-170)/()   SpO2:  [96 %-98 %]       Significant Labs:  Lab Results   Component Value Date    WBC 13.00 (H) 12/21/2018     HGB 9.0 (L) 12/21/2018    HCT 30.7 (L) 12/21/2018     (H) 12/21/2018    ALT 10 12/21/2018    AST 10 12/21/2018     12/21/2018    K 4.0 12/21/2018     12/21/2018    CREATININE 2.7 (H) 12/21/2018    BUN 12 12/21/2018    CO2 24 12/21/2018    PSA 0.39 11/02/2018    INR 1.1 12/18/2018    HGBA1C 4.4 11/09/2018     EKG:   Vent. Rate : 091 BPM     Atrial Rate : 091 BPM     P-R Int : 122 ms          QRS Dur : 092 ms      QT Int : 376 ms       P-R-T Axes : 012 010 031 degrees     QTc Int : 462 ms    Normal sinus rhythm  Nonspecific T wave abnormality  Abnormal ECG  When compared with ECG of 06-DEC-2018 10:39,  Nonspecific T wave abnormality has replaced inverted T waves in Anterior  leads  Confirmed by THOMAS DUTTON MD ([id number]) on 12/19/2018 4:17:48 PM    2D ECHO:  Results for orders placed or performed during the hospital encounter of 10/27/18   2D echo with color flow doppler   Result Value Ref Range    QEF 73 55 - 65    Diastolic Dysfunction No     Est. PA Systolic Pressure 32.16     Tricuspid Valve Regurgitation TRIVIAL          ASSESSMENT/PLAN:       Anesthesia Evaluation    I have reviewed the Patient Summary Reports.    I have reviewed the Nursing Notes.   I have reviewed the Medications.     Review of Systems  Anesthesia Hx:  History of prior surgery of interest to airway management or planning: liver transplant. Denies Family Hx of Anesthesia complications.   Denies Personal Hx of Anesthesia complications.   Social:  Non-Smoker, Alcohol Use    Hematology/Oncology:         -- Anemia:   Cardiovascular:   Hypertension hyperlipidemia ECG has been reviewed.    Pulmonary:  Pulmonary Normal    Renal/:   Chronic Renal Disease, Dialysis    Hepatic/GI:   Liver Disease, Hepatitis Inability to tolerate PO   Endocrine:   Diabetes    Psych:   Psychiatric History          Physical Exam  General:  Well nourished    Airway/Jaw/Neck:  Airway Findings: Mouth Opening: Normal Tongue: Normal  Mallampati: I  Improves  to I with phonation.  TM Distance: Normal, at least 6 cm      Dental:  Dental Findings: In tact   Chest/Lungs:  Chest/Lungs Findings: Clear to auscultation, Normal Respiratory Rate     Heart/Vascular:  Heart Findings: Rate: Normal  Rhythm: Regular Rhythm  Sounds: Normal     Abdomen:  Abdomen Findings: Normal    Musculoskeletal:  Musculoskeletal Findings: Normal   Skin:  Skin Findings: Normal    Mental Status:  Mental Status Findings:  Cooperative, Alert and Oriented         Anesthesia Plan  Type of Anesthesia, risks & benefits discussed:  Anesthesia Type:  general, MAC  Patient's Preference:   Intra-op Monitoring Plan: standard ASA monitors  Intra-op Monitoring Plan Comments:   Post Op Pain Control Plan: multimodal analgesia  Post Op Pain Control Plan Comments:   Induction:   IV  Beta Blocker:         Informed Consent: Patient understands risks and agrees with Anesthesia plan.  Questions answered. Anesthesia consent signed with patient.  ASA Score: 3     Day of Surgery Review of History & Physical:    H&P update referred to the surgeon.         Ready For Surgery From Anesthesia Perspective.

## 2018-12-21 NOTE — ASSESSMENT & PLAN NOTE
- See intra-abdominal abscess.  - wbc improving, cont w delirium.    - Repeat cx 12/4 with NGTD.  Completed Linezolid and Cefepime per ID.    - Wbc trended up 12/11- repeat cx 12/11.  - WBC w/ improvement 12/12  - WBC again elevated at 13k on 12/20 and 12/21. Blood cx 12/19 NGTD. Afebrile. CXR no detrimental change. Thought possibly related to dehydration. Will encourage PO intake and continue to monitor.

## 2018-12-21 NOTE — ASSESSMENT & PLAN NOTE
- Cont with bactrim and valcyte for protection against opportunistic infections.   - Cont Isavuconazonium for fungal prophylaxis.   Other hypervolemia

## 2018-12-21 NOTE — PROGRESS NOTES
[facility]  Physical Medicine & Rehab  Progress Note    Patient Name: Femi Enciso  MRN: [id number]  Admission Date: 10/27/2018  Length of Stay: 55 days  Attending Physician: Femi Patel MD    Subjective:     Principal Problem:S/P liver transplant    Hospital Course:   11/21/2018: Evaluated by therapy.  Bed mobility MaxA.  Sit to stand ModA x 2 ppl.  Ambulated 1 step MaxA.  UBD/LBD/toileting MaxA. Grooming Vijay.  12/19/2018: Participated with therapy.  Bed mobility Vijay.  Sit to stand Min-ModA and transfers ModA x 2 ppl.  No gait 2/2 nausea and dizziness during steps for trx.  UBD Vijay and feeding (I).   12/20/2018: Participated with OT.  Sit to stand ModA x 2 ppl. No gait 2/2 anxiety.  Grooming Mod (I).     Interval History 12/21/2018:  Patient is seen for follow-up rehab evaluation and recommendations: Nutritional status pending.     HPI, Past Medical, Family, and Social History remains the same as documented in the initial encounter.    Scheduled Medications:    aspirin  81 mg Oral Daily    epoetin sherlyn (PROCRIT) injection  50 Units/kg (Dosing Weight) Intravenous Every Mon, Wed, Fri    ergocalciferol  50,000 Units Oral Q7 Days    escitalopram oxalate  20 mg Oral Nightly    heparin (porcine)  5,000 Units Subcutaneous Q8H    isavuconazonium sulfate  372 mg Oral Daily    lidocaine  1 patch Transdermal Q24H    magnesium sulfate IVPB  2 g Intravenous Once    mirtazapine  7.5 mg Oral QHS    pantoprazole  40 mg Oral BID AC    predniSONE  2.5 mg Oral Daily    sulfamethoxazole-trimethoprim 400-80mg  1 tablet Oral Every Mon, Wed, Fri    tacrolimus  2 mg Oral BID    ursodiol  300 mg Oral BID    valganciclovir 50 mg/ml  100 mg Oral Once per day on Mon Wed Fri       Diagnostic Results: Labs: Reviewed    PRN Medications: acetaminophen, albuterol-ipratropium, artificial tears, bacitracin, bisacodyl, dextrose 50%, dextrose 50%, glucagon (human recombinant), glucose, glucose, heparin (porcine),  midodrine, ondansetron, ramelteon, tiZANidine, traMADol    Review of Systems   Constitutional: Positive for activity change and fatigue. Negative for fever.   HENT: Negative for trouble swallowing and voice change.    Respiratory: Negative for cough and shortness of breath.    Cardiovascular: Negative for chest pain and palpitations.   Gastrointestinal: Positive for abdominal distention, abdominal pain and nausea. Negative for diarrhea.   Musculoskeletal: Positive for gait problem. Negative for arthralgias.   Skin: Positive for wound (surgical). Negative for color change.   Neurological: Positive for dizziness and weakness.     Objective:     Vital Signs (Most Recent):  Temp: 98.3 °F (36.8 °C) (12/21/18 0802)  Pulse: 86 (12/21/18 0802)  Resp: (!) 31 (12/21/18 0802)  BP: (!) 170/104 (12/21/18 0802)  SpO2: 97 % (12/21/18 0802)    Vital Signs (24h Range):  Temp:  [98.3 °F (36.8 °C)-98.4 °F (36.9 °C)] 98.3 °F (36.8 °C)  Pulse:  [85-92] 86  Resp:  [17-31] 31  SpO2:  [96 %-98 %] 97 %  BP: (126-170)/() 170/104     Physical Exam   Constitutional: He is oriented to person, place, and time. He appears well-developed.   very thin    HENT:   Head: Normocephalic and atraumatic.   Eyes: Right eye exhibits no discharge. Left eye exhibits no discharge. No scleral icterus.   Neck: Neck supple.   Cardiovascular: Normal rate and intact distal pulses.   Pulmonary/Chest: Effort normal. No respiratory distress.   Abdominal: Soft. He exhibits distension.   Musculoskeletal: Normal range of motion. He exhibits no edema or deformity.   Generalized deconditioning    Neurological: He is alert and oriented to person, place, and time. No sensory deficit.   Follows commands    Skin: Skin is warm and dry.     Assessment/Plan:      * S/P liver transplant    -s/p liver trx on 11/11 for alcoholic cirrhosis       Severe malnutrition    -see decreased appetite     Decreased appetite    -SLP eval 12/20 regular diet and thin liquids  -also  complaining of nausea & abd pain while eating   -may benefit from GI consult with alternate route of nutrition       Acute renal failure with acute tubular necrosis superimposed on stage 3 chronic kidney disease    -on HD     Intra-abdominal abscess    -s/p drain   -IV abx completed     Debility    -2/2 liver trx with deconditioning     See hospital course for functional, cognitive/speech/language, and nutrition/swallow status.      Recommendations  -  Encourage mobility, OOB in chair at least 3 hours per day, and early ambulation as appropriate  -  PT/OT evaluate and treat  -  Pain management  -  Monitor for and prevent skin breakdown and pressure ulcers  · Early mobility, repositioning/weight shifting every 20-30 minutes when sitting, turn patient every 2 hours, proper mattress/overlay and chair cushioning, pressure relief/heel protector boots  -  Reviewed discharge options (IP rehab, SNF, HH therapy, and OP therapy)       Pleural effusion associated with hepatic disorder    -last CXR on 12/19 with small left pleural effusion may be present  -on RA  -completed IV abx     Nutritional status pending. Participating with therapy. Will follow progress and discuss with rehab team for post acute care/rehab recommendation.      Mela Scanlon NP  Department of Physical Medicine & Rehab   [facility]

## 2018-12-21 NOTE — ASSESSMENT & PLAN NOTE
Mr Enciso is a 53 year old man with history of ETOH ESLD s/p OLT, ESRD, s/p OLT 11/11, GI consulted for consideration of EGD.    Prior negative EGD 11/2018, endorses symptoms onset since most recent surgery and involve nausea with solid intake followed by vomitus. Passed SLP. Denies any dysphagia or odynophagia or abdominal pain.    Symptoms could be due to esophagitis/gastritis or stress ulceration in setting of illness and multiple surgeries. Additionally could be infectious such as esohpageal candidiasis though symptoms are not completely consistent.    Plan  - NPO Gerry night  - EGD on Monday  - protonix 40mg BID  - can trial GI cocktail  - plan discussed with primary team

## 2018-12-21 NOTE — ASSESSMENT & PLAN NOTE
- Pt remains significantly debilitated.   - PT/OT for strengthening.   - Currently will need SNF for placement and further rehabilitation.   - Pt remains severely debilitated and not strong enough for SNF at this time.    - Cont with strengthening  - SNF consult placed 12/17. However, denied for SNF again as not strong enough at this time  - Rehab consulted, following.

## 2018-12-21 NOTE — PLAN OF CARE
Problem: Physical Therapy Goal  Goal: Physical Therapy Goal  Goals to be met by: 2018     Patient will increase functional independence with mobility by performin. Supine to sit with Modified [place] -not met  2. Sit to stand transfer with [place] -not met  3. Bed to chair transfer with independence using no AD -not met  4. Gait  x150 feet with Supervision using LRAD. -not met  5. Lower extremity exercise program x20 reps per handout, with independence -not met          Outcome: Ongoing (interventions implemented as appropriate)  Progressing    Percy Coleman DPT  2018

## 2018-12-21 NOTE — CONSULTS
[facility]  Gastroenterology  Consult Note    Patient Name: Femi Enciso  MRN: [id number]  Admission Date: 10/27/2018  Hospital Length of Stay: 55 days  Code Status: Full Code   Attending Provider: Femi Patel MD   Consulting Provider: Percy Dhillon MD  Primary Care Physician: Primary Doctor No  Principal Problem:S/P liver transplant    Inpatient consult to Gastroenterology  Consult performed by: Percy Dhillon MD  Consult ordered by: Therese Vu PA-C        Subjective:     HPI:  Mr Enciso is a 53 year old man with history of ETOH ESLD s/p OLT, ESRD, s/p OLT 11/11, GI consulted for consideration of EGD.    HPI per team  Femi Enciso is a 53yr old male with PMH of acute ETOH hepatitis and DEL/ATN evolved to ESRD requiring HD (not candidate for KTX). He is now s/p liver transplant (SM induction, DBD, CMV D+/R-). Transplant surgery noted severe collateralization, adrenal gland firmly adhered to liver. Bare area of adrenal gland required several stitches and packing to obtain fair hemostasis (EBL 15L). OR take back 11/16 for bleeding from R adrenal bed in AM (significant clot in retroperitoneum, posterior to R hepatic lobe, tract posterior to the R kidney containing significant clot, small bleeding area of retroperitoneal fat) then returned to surgery same day for hemorrhaging closed with wound vac with plans to return to OR 24-48 hours for closure (retroperitoneal /retrorenal/retrocolic hematoma on the right ). Raw retroperitoneal bleeding. Suture ligatures placed, argon beam cautery, evarest placed in retroperitoneum behind cava and right kidney. Significant oozing from upper right adrenal gland, not amenable to ligation, that portion of the adrenal gland was resected with cautery). OR take back 11/18 for washout and incisional closure, no significant bleeding or hematoma found. OR cultures 11/18  from body fluid grew enterococcus faecium VRE. ID was consulted, 11/21 started on daptomycin for  VRE treatment and cefepime/flagyl to cover for IA ty in bile. Patient with significant leukocytosis with peak on 11/22 at 48K prompting drainage of R subphrenic fluid collection, perc drain placed, culture grew VRE. Stroke code called 11/21 for lethargy and unresponsive, CT head without evidence of acute ischemic changes and CTA chest negative, vascular neurology was consulted recommended MRI brain, but patient's AMS improved shortly after event. Nephrology consulted for ESRD requiring HD. Patient overall improved on antibiotic regimen, leukocytosis and AMS improved. He was transferred to [facility] on POD #15.     Due to concern for poor PO intake GI consulted. He reports that prior to his OLT he was eating without issue. He notes that since his 3rd OR he has had difficulty with tolerating PO. He notes that he has no issues with swallowing or feeling the food becomes stuck or having to vomit up initially. However, he notes that ~20 minutes after eating he develops nausea, sensation of food at lower sternum, followed by vomiting. Able to tolerate liquid with minimal nausea but happens frequently with solid intake. He denies any abdominal pain. Denies constipation, endorsing q2 day BM, including normal BM today. Denies any odynophagia or dysphagia. Denies any other complaints on review.    He was seen by SLP who passed him without any evidence of aspiration or pharyngeal dysphagia but recommended GI testing due to patient's complaints.    Prior Endo Hx  EGD. (11/6/18). Dr. Jules. Indication:Variceal screening.  - Normal examined duodenum.  - Portal hypertensive gastropathy.   - Large (> 5 mm) esophageal varices. Completely eradicated. Banded.              Past Medical History:   Diagnosis Date    Liver cirrhosis, alcoholic 09/30/2018       Past Surgical History:   Procedure Laterality Date    EGD (ESOPHAGOGASTRODUODENOSCOPY) N/A 11/6/2018    Performed by Duarte Jules MD at [facility] ENDO (2ND FLR)    INSERTION, CATHETER,  CENTRAL VENOUS, HEMODIALYSIS, TUNNELED N/A 11/7/2018    Performed by Brock Gonzalez MD at [facility] CATH LAB    LAPAROTOMY, EXPLORATORY N/A 11/16/2018    Performed by Amadou Lester Jr., MD at [facility] OR 2ND FLR    LAPAROTOMY, EXPLORATORY, AFTER LIVER TRANSPLANT N/A 11/16/2018    Performed by Amadou Lester Jr., MD at [facility] OR 2ND FLR    LAPAROTOMY, EXPLORATORY, AFTER LIVER TRANSPLANT, LIKELY  ABDOMINAL CLOSURE N/A 11/18/2018    Performed by Amadou Lester Jr., MD at [facility] OR 2ND FLR    TRANSPLANT, LIVER N/A 11/11/2018    Performed by Shmuel Leary MD at [facility] OR [facility] FLR       Review of patient's allergies indicates:   Allergen Reactions    Penicillins Nausea And Vomiting and Rash     Full body rash from cefepime as well     Family History     None        Tobacco Use    Smoking status: Never Smoker   Substance and Sexual Activity    Alcohol use: Not on file    Drug use: Not on file    Sexual activity: Not on file     Review of Systems   Constitutional: Positive for appetite change. Negative for activity change, chills, fatigue and fever.   HENT: Negative for mouth sores, sore throat and trouble swallowing.    Eyes: Negative for visual disturbance.   Respiratory: Negative for chest tightness and shortness of breath.    Cardiovascular: Negative for chest pain.   Gastrointestinal: Positive for nausea and vomiting. Negative for abdominal pain, anal bleeding, blood in stool, constipation and diarrhea.   Genitourinary: Negative for dysuria.   Musculoskeletal: Negative for arthralgias and myalgias.   Skin: Negative for rash.   Neurological: Negative for dizziness and light-headedness.   Psychiatric/Behavioral: Negative for agitation and confusion.     Objective:     Vital Signs (Most Recent):  Temp: 97.5 °F (36.4 °C) (12/21/18 1149)  Pulse: 90 (12/21/18 1145)  Resp: (!) 24 (12/21/18 1145)  BP: (!) 159/97 (12/21/18 1145)  SpO2: 98 % (12/21/18 1145) Vital Signs (24h Range):  Temp:  [97.5 °F (36.4  °C)-98.4 °F (36.9 °C)] 97.5 °F (36.4 °C)  Pulse:  [85-90] 90  Resp:  [17-31] 24  SpO2:  [96 %-98 %] 98 %  BP: (136-170)/() 159/97     Weight: 72.8 kg (160 lb 7.9 oz) (12/20/18 0544)  Body mass index is 23.03 kg/m².      Intake/Output Summary (Last 24 hours) at 12/21/2018 1321  Last data filed at 12/21/2018 1300  Gross per 24 hour   Intake 260 ml   Output 15 ml   Net 245 ml       Lines/Drains/Airways     Central Venous Catheter Line                 Tunneled Central Line Insertion/Assessment - Double Lumen  11/07/18 1350 right internal jugular 43 days          Peripheral Intravenous Line                 Midline Catheter Insertion/Assessment  - Single Lumen 12/03/18 1248 Right basilic vein (medial side of arm) 18g x 8cm 18 days                Physical Exam   Constitutional: He is oriented to person, place, and time. No distress.   Frail and cachectic appearing but no distress.   HENT:   Head: Normocephalic and atraumatic.   Mouth/Throat: No oropharyngeal exudate.   No oral lesions or thrush   Eyes: Conjunctivae are normal. No scleral icterus.   Cardiovascular: Normal rate, regular rhythm, normal heart sounds and intact distal pulses.   Pulmonary/Chest: Effort normal and breath sounds normal. No respiratory distress.   Abdominal: Soft. Bowel sounds are normal. He exhibits no distension and no mass. There is no tenderness. There is no rebound and no guarding.   Well healing, intact and clean chevron incision.   Musculoskeletal: He exhibits no edema or tenderness.   Lymphadenopathy:     He has no cervical adenopathy.   Neurological: He is alert and oriented to person, place, and time.   Skin: Skin is warm. Capillary refill takes less than 2 seconds. He is not diaphoretic.   Psychiatric: He has a normal mood and affect. His behavior is normal. Judgment and thought content normal.   Nursing note and vitals reviewed.      Significant Labs:  CBC:   Recent Labs   Lab 12/20/18  0633 12/21/18  0647   WBC 13.20* 13.00*    HGB 9.3* 9.0*   HCT 30.7* 30.7*   * 381*     CMP:   Recent Labs   Lab 12/21/18  0647   GLU 96   CALCIUM 9.0   ALBUMIN 2.3*   PROT 5.9*      K 4.0   CO2 24      BUN 12   CREATININE 2.7*   ALKPHOS 159*   ALT 10   AST 10   BILITOT 1.4*     Coagulation: No results for input(s): PT, INR, APTT in the last 48 hours.    Significant Imaging:  Imaging results within the past 24 hours have been reviewed.    Assessment/Plan:     Decreased appetite    Mr Enciso is a 53 year old man with history of ETOH ESLD s/p OLT, ESRD, s/p OLT 11/11, GI consulted for consideration of EGD.    Prior negative EGD 11/2018, endorses symptoms onset since most recent surgery and involve nausea with solid intake followed by vomitus. Passed SLP. Denies any dysphagia or odynophagia or abdominal pain.    Symptoms could be due to esophagitis/gastritis or stress ulceration in setting of illness and multiple surgeries. Additionally could be infectious such as esohpageal candidiasis though symptoms are not completely consistent.    Plan  - NPO Gerry night  - EGD on Monday  - protonix 40mg BID  - can trial GI cocktail  - plan discussed with primary team           Thank you for your consult. I will follow-up with patient. Please contact us if you have any additional questions.    Percy Dhillon MD  Gastroenterology  [facility]

## 2018-12-21 NOTE — ASSESSMENT & PLAN NOTE
- Dietician following. Severe temporal, clavicle muscle depletion noted. Severe subq fat loss  - Nutrition has been poor over the past 24 hours.   - Discussed at length with patient and caregivers that if patient's nutrition status does not improve, will need supplemental nutrition via tube feeds or TPN.  - Appetite stimulant started 12/19.   - Caregivers to bring in outside food for patient.   - Continue calorie count.  - EGD Monday 12/24 as pt c/o poor appetite + burning in chest/esophagus and occasionally vomiting after eating x several weeks.   - If no improvement in po intake in next few days, will need to start tube feeds.

## 2018-12-21 NOTE — PROGRESS NOTES
[facility]  Liver Transplant  Progress Note    Patient Name: Femi Enciso  MRN: [id number]  Admission Date: 10/27/2018  Hospital Length of Stay: 55 days  Code Status: Full Code  Primary Care Provider: Primary Doctor No  Post-Operative Day: 40    ORGAN:   LIVER  Disease Etiology: Alcoholic Cirrhosis  Donor Type:    - Brain Death  CDC High Risk:   No  Donor CMV Status:   Donor CMV Status: Positive  Donor HBcAB:   Negative  Donor HCV Status:   Negative  Donor HBV JAVIER: Negative  Donor HCV JAVIER: Negative  Whole or Partial: Whole Liver  Biliary Anastomosis: End to End  Arterial Anatomy: Standard  Subjective:     History of Present Illness:  Femi Enciso is a 53yr old male with PMH of acute ETOH hepatitis and DEL/ATN evolved to ESRD requiring HD (not candidate for KTX). He is now s/p liver transplant (SM induction, DBD, CMV D+/R-). Transplant surgery noted severe collateralization, adrenal gland firmly adhered to liver. Bare area of adrenal gland required several stitches and packing to obtain fair hemostasis (EBL 15L). OR take back  for bleeding from R adrenal bed in AM (significant clot in retroperitoneum, posterior to R hepatic lobe, tract posterior to the R kidney containing significant clot, small bleeding area of retroperitoneal fat) then returned to surgery same day for hemorrhaging closed with wound vac with plans to return to OR 24-48 hours for closure (retroperitoneal /retrorenal/retrocolic hematoma on the right ). Raw retroperitoneal bleeding. Suture ligatures placed, argon beam cautery, evarest placed in retroperitoneum behind cava and right kidney. Significant oozing from upper right adrenal gland, not amenable to ligation, that portion of the adrenal gland was resected with cautery). OR take back  for washout and incisional closure, no significant bleeding or hematoma found. OR cultures   from body fluid grew enterococcus faecium VRE. ID was consulted,  started on  daptomycin for VRE treatment and cefepime/flagyl to cover for IA ty in bile. Patient with significant leukocytosis with peak on 11/22 at 48K prompting drainage of R subphrenic fluid collection, perc drain placed, culture grew VRE. Stroke code called 11/21 for lethargy and unresponsive, CT head without evidence of acute ischemic changes and CTA chest negative, vascular neurology was consulted recommended MRI brain, but patient's AMS improved shortly after event. Nephrology consulted for ESRD requiring HD. Patient overall improved on antibiotic regimen, leukocytosis and AMS improved. He was transferred to TSU on POD #15.     Interval History:  No acute events overnight. Lab work notable for leukocytosis yesterday, same today at 13k. Blood cx sent 12/19 with NGTD. Pt c/o CP this AM. EKG stable from prior. Troponin wnl. Afebrile. CXR 12/20 showed no detrimental change. Bps elevated this AM. Will reassess Bps after dialysis this afternoon. Appetite stimulant started 12/19. Caregivers to bring in outside food for patient. He was seen by SLP 12/20 who passed him without any evidence of aspiration or pharyngeal dysphagia, but recommended GI testing due to pt's complaints of burning sensation and reflux/vomiting after eating. GI consulted and saw pt today. Appreciate recs. Increased Protonix to BID and will plan for EGD on Monday. Meanwhile, continue working on nutrition status, calorie count, and aggressive PT/OT.       Scheduled Meds:   aspirin  81 mg Oral Daily    epoetin sherlyn (PROCRIT) injection  50 Units/kg (Dosing Weight) Intravenous Every Mon, Wed, Fri    ergocalciferol  50,000 Units Oral Q7 Days    escitalopram oxalate  20 mg Oral Nightly    heparin (porcine)  5,000 Units Subcutaneous Q8H    isavuconazonium sulfate  372 mg Oral Daily    lidocaine  1 patch Transdermal Q24H    magnesium sulfate IVPB  2 g Intravenous Once    mirtazapine  7.5 mg Oral QHS    pantoprazole  40 mg Oral Daily    predniSONE  2.5  mg Oral Daily    sulfamethoxazole-trimethoprim 400-80mg  1 tablet Oral Every Mon, Wed, Fri    tacrolimus  2 mg Oral BID    ursodiol  300 mg Oral BID    valganciclovir 50 mg/ml  100 mg Oral Once per day on Mon Wed Fri     Continuous Infusions:  PRN Meds:acetaminophen, albuterol-ipratropium, artificial tears, bacitracin, bisacodyl, dextrose 50%, dextrose 50%, glucagon (human recombinant), glucose, glucose, heparin (porcine), midodrine, ondansetron, ramelteon, tiZANidine, traMADol    Review of Systems   Constitutional: Positive for activity change and appetite change (decreased). Negative for fatigue and fever.   HENT: Positive for sore throat. Negative for congestion, facial swelling and voice change.    Eyes: Negative for pain, discharge and visual disturbance.   Respiratory: Negative for apnea, cough, choking, chest tightness, shortness of breath and wheezing.    Cardiovascular: Positive for chest pain. Negative for palpitations and leg swelling.   Gastrointestinal: Positive for nausea. Negative for abdominal distention, abdominal pain, constipation, diarrhea and vomiting.   Endocrine: Negative.    Genitourinary: Positive for decreased urine volume. Negative for difficulty urinating, dysuria, hematuria and urgency.   Musculoskeletal: Negative.  Negative for back pain, gait problem, neck pain and neck stiffness.   Skin: Positive for wound. Negative for color change and pallor.   Allergic/Immunologic: Positive for immunocompromised state.   Neurological: Positive for weakness. Negative for dizziness, seizures, light-headedness and headaches.   Psychiatric/Behavioral: Negative for behavioral problems, confusion, decreased concentration, dysphoric mood, hallucinations, sleep disturbance and suicidal ideas. The patient is not nervous/anxious.      Objective:     Vital Signs (Most Recent):  Temp: 98.3 °F (36.8 °C) (12/21/18 0802)  Pulse: 86 (12/21/18 0802)  Resp: (!) 31 (12/21/18 0802)  BP: (!) 170/104 (12/21/18  0802)  SpO2: 97 % (12/21/18 0802) Vital Signs (24h Range):  Temp:  [98.3 °F (36.8 °C)-98.4 °F (36.9 °C)] 98.3 °F (36.8 °C)  Pulse:  [85-92] 86  Resp:  [17-31] 31  SpO2:  [96 %-98 %] 97 %  BP: (126-170)/() 170/104     Weight: 72.8 kg (160 lb 7.9 oz)  Body mass index is 23.03 kg/m².    Intake/Output - Last 3 Shifts       12/19 0700 - 12/20 0659 12/20 0700 - 12/21 0659 12/21 0700 - 12/22 0659    P.O. 190 110     I.V. (mL/kg)  100 (1.4)     Other 600      Total Intake(mL/kg) 790 (10.9) 210 (2.9)     Urine (mL/kg/hr) 300 (0.2) 0 (0)     Emesis/NG output 100 0     Other 2025      Stool 0 0     Total Output 2425 0     Net -1635 +210            Urine Occurrence  0 x     Stool Occurrence  1 x           Physical Exam   Constitutional: He is oriented to person, place, and time. He appears well-developed. No distress.   Temporal and distal extremity muscle wasting noted.   HENT:   Head: Normocephalic and atraumatic.   Mouth/Throat: Oropharynx is clear and moist. No oropharyngeal exudate.   Eyes: EOM are normal.   Neck: Normal range of motion. Neck supple. No JVD present.   Cardiovascular: Normal rate, regular rhythm and normal heart sounds.   No murmur heard.  Pulmonary/Chest: Effort normal. No respiratory distress. He has decreased breath sounds in the right lower field and the left lower field. He has no wheezes. He exhibits no tenderness.   Abdominal: Soft. Bowel sounds are normal. He exhibits no distension. There is no tenderness.   Chevron inc clean, dry, intact with steri strips in place     Musculoskeletal: Normal range of motion. He exhibits no tenderness. Edema: trace ankle edema.   Neurological: He is alert and oriented to person, place, and time. He has normal reflexes.   Skin: Skin is warm and dry. Capillary refill takes 2 to 3 seconds. He is not diaphoretic. No pallor.   Psychiatric: He has a normal mood and affect. His behavior is normal. Thought content normal. His affect is not labile. He is not slowed.  "Cognition and memory are not impaired.   Nursing note and vitals reviewed.      Laboratory:  Immunosuppressants         Stop Route Frequency     tacrolimus capsule 2 mg      -- Oral 2 times daily        CBC:   Recent Labs   Lab 12/21/18  0647   WBC 13.00*   RBC 3.13*   HGB 9.0*   HCT 30.7*   *   MCV 98   MCH 28.8   MCHC 29.3*     CMP:   Recent Labs   Lab 12/21/18  0647   GLU 96   CALCIUM 9.0   ALBUMIN 2.3*   PROT 5.9*      K 4.0   CO2 24      BUN 12   CREATININE 2.7*   ALKPHOS 159*   ALT 10   AST 10   BILITOT 1.4*     Labs within the past 24 hours have been reviewed.    Diagnostic Results:  I have personally reviewed all pertinent imaging studies.    Assessment/Plan:     * S/P liver transplant    - LFTs now improving slowly.  T bili was 37.6 day of transplant.  - Drain placed during take back. Drain placed to intra-abdominal abscess on 11/22.   - Fluid from collection noted with enterococcus VRE completed Zyvox treatment.  - Routine month 1 Liver US obtained 12/17 which showed elevated velocity of hepatic artery and low RIs.   - Vascular Surgery consulted. Recommend repeat US in 10-14 days (12/27-12/31) to reassess. Appreciate Vascular assistance.      Chest pain    - Pt c/o "burning" diffuse CP 12/21 AM  - EKG NSR, stable from prior  - Troponin wnl  - Likely GI related pain. GI consulted, EGD planned for 12/24.       Hypophosphatemia    - Continue to monitor.         Acute kidney failure with lesion of tubular necrosis    - Cont Dialysis MWF.  - Nephrology following, appreciate recs.  - Monitor.     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       Hypomagnesemia    - 2g IV Mag  - Continue to monitor.     Severe malnutrition    - Dietician following. Severe temporal, clavicle muscle depletion noted. Severe subq fat loss  - Nutrition has been poor over the past 24 hours.   - Discussed at length with patient and caregivers that if patient's " nutrition status does not improve, will need supplemental nutrition via tube feeds or TPN.  - Appetite stimulant started 12/19.   - Caregivers to bring in outside food for patient.   - Continue calorie count.  - EGD Monday 12/24 as pt c/o poor appetite + burning in chest/esophagus and occasionally vomiting after eating x several weeks.   - If no improvement in po intake in next few days, will need to start tube feeds.     Anxiety    - Restarted Lexapro as per psych recs, 12/13.   - Monitor.     Prolonged Q-T interval on ECG    -  ms on EKG 12/19.  -  on EKG 12/21.  - Monitor.       Alcohol use disorder, severe, in early remission, dependence    - Monitor.       Decreased appetite    - Continue appetite stimulant.  - GI consulted as pt c/o burning sensation in chest/esophagus + vomiting after eating, passed SLP eval, recommended GI f/u.  - EGD planned for Monday, 12/24.       Hyperkalemia    - Continue low K diet.  - Monitor.       Acute renal failure with acute tubular necrosis superimposed on stage 3 chronic kidney disease    - 2 weeks for DEL prior to transplant  - Renal function slow to recover post-op.   - Nephrology consulted for management.   - Cont with HD as per nephrology recs.  Apprec recs.    - Lasix 160 mg challenge 11/28 w/ minimal output.    - HD MWF.   - Strict I&Os.      Intra-abdominal abscess    - Significant increase in WBC post-op.   - OR cultures from 11/18 noted with enterococcus VRE.   - Abdominal CT performed at that time with fluid collection and drain placed on 11/22 for drainage.   - Intra-abdominal abscess culture also with enterococcus VRE.   - ID consulted and pt placed on antibxs for treatment. Pt was on Cefepime, Daptomycin, and Fluconazole for treatment.    - Pt remains with RLQ drains (one JOSE A and one Percutaneous).  One JOSE A removed 11/28.  Perc drain in placed draining tan, clear drainage.  WBC slowly improving.   - Liver US on 11/26 reviewed.   - Abdominal CT performed  11/27 and reviewed. Fluid collection noted with improvement on CT.  ID following and reassured by findings.    - Dapto, Cefepime and Flagyl changed 11/30/18 to Linezolid 600 mg PO q 12 hr x 10 days per ID.     - Added back cefepime after thora.  ID re consulted.  - Completed Zyvox x 10 days per ID thru 12/9/18. Monitor.        Long-term use of immunosuppressant medication    - Cont with prograf. Draw prograf level daily and adjust dose as needed to maintain a therapeutic level.        At risk for opportunistic infections    - Cont with bactrim and valcyte for protection against opportunistic infections.   - Cont Isavuconazonium for fungal prophylaxis.     Leukocytosis    - See intra-abdominal abscess.  - wbc improving, cont w delirium.    - Repeat cx 12/4 with NGTD.  Completed Linezolid and Cefepime per ID.    - Wbc trended up 12/11- repeat cx 12/11.  - WBC w/ improvement 12/12  - WBC again elevated at 13k on 12/20 and 12/21. Blood cx 12/19 NGTD. Afebrile. CXR no detrimental change. Thought possibly related to dehydration. Will encourage PO intake and continue to monitor.     Adrenal cortical steroids causing adverse effect in therapeutic use    - Monitor.       Prophylactic immunotherapy    - See long term immunosuppression.        Debility    - Pt remains significantly debilitated.   - PT/OT for strengthening.   - Currently will need SNF for placement and further rehabilitation.   - Pt remains severely debilitated and not strong enough for SNF at this time.    - Cont with strengthening  - SNF consult placed 12/17. However, denied for SNF again as not strong enough at this time  - Rehab consulted, following.        Pleural effusion associated with hepatic disorder    - c/o increased pain w deep breath today to right side.  CXR showed increased opacity in the inferior hemothorax on the right side since 11/26/2018.  Pulse ox 97-99% on RA.  Cont to monitor.   - continues to have fluid to RLL.  WBC remains elevated.     - thoracentesis performed on 11/30. Fluid noted with 4k WBC, 93 SEGS. cx no growth to date.  Cefepime added for treatment.  - Chest CT showing right pleural effusion improved, left w increased pleural effusion.   - Per ID, completed treatment of empyema w Cefepime thru 12/8.  - sats remain 97-99% on RA.  - CXR 12/20 showed left hemidiaphragmatic margin that is better delineated than on 12/19/2018, consistent with improved aeration in the left lower lobe. + no significant detrimental interval change in the appearance of the chest, with the current examination demonstrating a slightly improved inspiratory depth level.       Type 2 diabetes mellitus without complication    - Endocrine consulted for management. Appreciate recs.   - Hypoglycemia 12/10 requiring D50.    - Repeat cx 12/11 - no growth       Anemia of chronic disease    - H&H stable. Monitor with daily labs.   - Chest/Abd/pelvis CT 12/4- no fluid to be drainged per transplant surgeon.  No source of bleeding.     - Last liver US 11/27. Evolving peritransplant hematomas with anechoic fluid collection adjacent to the right hepatic lobe.  Small volume perihepatic free fluid.         VTE Risk Mitigation (From admission, onward)        Ordered     heparin (porcine) injection 1,000 Units  As needed (PRN)      12/12/18 1731     heparin (porcine) injection 5,000 Units  Every 8 hours      11/30/18 1149     IP VTE HIGH RISK PATIENT  Once      11/11/18 1208          The patients clinical status was discussed at multidisplinary rounds, involving transplant surgery, transplant medicine, pharmacy, nursing, nutrition, and social work    Discharge Planning:  No Patient Care Coordination Note on file.      Therese Vu PA-C  Liver Transplant  [facility]

## 2018-12-21 NOTE — SUBJECTIVE & OBJECTIVE
Scheduled Meds:   aspirin  81 mg Oral Daily    epoetin sherlyn (PROCRIT) injection  50 Units/kg (Dosing Weight) Intravenous Every Mon, Wed, Fri    ergocalciferol  50,000 Units Oral Q7 Days    escitalopram oxalate  20 mg Oral Nightly    heparin (porcine)  5,000 Units Subcutaneous Q8H    isavuconazonium sulfate  372 mg Oral Daily    lidocaine  1 patch Transdermal Q24H    magnesium sulfate IVPB  2 g Intravenous Once    mirtazapine  7.5 mg Oral QHS    pantoprazole  40 mg Oral Daily    predniSONE  2.5 mg Oral Daily    sulfamethoxazole-trimethoprim 400-80mg  1 tablet Oral Every Mon, Wed, Fri    tacrolimus  2 mg Oral BID    ursodiol  300 mg Oral BID    valganciclovir 50 mg/ml  100 mg Oral Once per day on Mon Wed Fri     Continuous Infusions:  PRN Meds:acetaminophen, albuterol-ipratropium, artificial tears, bacitracin, bisacodyl, dextrose 50%, dextrose 50%, glucagon (human recombinant), glucose, glucose, heparin (porcine), midodrine, ondansetron, ramelteon, tiZANidine, traMADol    Review of Systems   Constitutional: Positive for activity change and appetite change (decreased). Negative for fatigue and fever.   HENT: Positive for sore throat. Negative for congestion, facial swelling and voice change.    Eyes: Negative for pain, discharge and visual disturbance.   Respiratory: Negative for apnea, cough, choking, chest tightness, shortness of breath and wheezing.    Cardiovascular: Positive for chest pain. Negative for palpitations and leg swelling.   Gastrointestinal: Positive for nausea. Negative for abdominal distention, abdominal pain, constipation, diarrhea and vomiting.   Endocrine: Negative.    Genitourinary: Positive for decreased urine volume. Negative for difficulty urinating, dysuria, hematuria and urgency.   Musculoskeletal: Negative.  Negative for back pain, gait problem, neck pain and neck stiffness.   Skin: Positive for wound. Negative for color change and pallor.   Allergic/Immunologic: Positive for  immunocompromised state.   Neurological: Positive for weakness. Negative for dizziness, seizures, light-headedness and headaches.   Psychiatric/Behavioral: Negative for behavioral problems, confusion, decreased concentration, dysphoric mood, hallucinations, sleep disturbance and suicidal ideas. The patient is not nervous/anxious.      Objective:     Vital Signs (Most Recent):  Temp: 98.3 °F (36.8 °C) (12/21/18 0802)  Pulse: 86 (12/21/18 0802)  Resp: (!) 31 (12/21/18 0802)  BP: (!) 170/104 (12/21/18 0802)  SpO2: 97 % (12/21/18 0802) Vital Signs (24h Range):  Temp:  [98.3 °F (36.8 °C)-98.4 °F (36.9 °C)] 98.3 °F (36.8 °C)  Pulse:  [85-92] 86  Resp:  [17-31] 31  SpO2:  [96 %-98 %] 97 %  BP: (126-170)/() 170/104     Weight: 72.8 kg (160 lb 7.9 oz)  Body mass index is 23.03 kg/m².    Intake/Output - Last 3 Shifts       12/19 0700 - 12/20 0659 12/20 0700 - 12/21 0659 12/21 0700 - 12/22 0659    P.O. 190 110     I.V. (mL/kg)  100 (1.4)     Other 600      Total Intake(mL/kg) 790 (10.9) 210 (2.9)     Urine (mL/kg/hr) 300 (0.2) 0 (0)     Emesis/NG output 100 0     Other 2025      Stool 0 0     Total Output 2425 0     Net -1635 +210            Urine Occurrence  0 x     Stool Occurrence  1 x           Physical Exam   Constitutional: He is oriented to person, place, and time. He appears well-developed. No distress.   Temporal and distal extremity muscle wasting noted.   HENT:   Head: Normocephalic and atraumatic.   Mouth/Throat: Oropharynx is clear and moist. No oropharyngeal exudate.   Eyes: EOM are normal.   Neck: Normal range of motion. Neck supple. No JVD present.   Cardiovascular: Normal rate, regular rhythm and normal heart sounds.   No murmur heard.  Pulmonary/Chest: Effort normal. No respiratory distress. He has decreased breath sounds in the right lower field and the left lower field. He has no wheezes. He exhibits no tenderness.   Abdominal: Soft. Bowel sounds are normal. He exhibits no distension. There is no  tenderness.   Chevron inc clean, dry, intact with steri strips in place     Musculoskeletal: Normal range of motion. He exhibits no tenderness. Edema: trace ankle edema.   Neurological: He is alert and oriented to person, place, and time. He has normal reflexes.   Skin: Skin is warm and dry. Capillary refill takes 2 to 3 seconds. He is not diaphoretic. No pallor.   Psychiatric: He has a normal mood and affect. His behavior is normal. Thought content normal. His affect is not labile. He is not slowed. Cognition and memory are not impaired.   Nursing note and vitals reviewed.      Laboratory:  Immunosuppressants         Stop Route Frequency     tacrolimus capsule 2 mg      -- Oral 2 times daily        CBC:   Recent Labs   Lab 12/21/18  0647   WBC 13.00*   RBC 3.13*   HGB 9.0*   HCT 30.7*   *   MCV 98   MCH 28.8   MCHC 29.3*     CMP:   Recent Labs   Lab 12/21/18  0647   GLU 96   CALCIUM 9.0   ALBUMIN 2.3*   PROT 5.9*      K 4.0   CO2 24      BUN 12   CREATININE 2.7*   ALKPHOS 159*   ALT 10   AST 10   BILITOT 1.4*     Labs within the past 24 hours have been reviewed.    Diagnostic Results:  I have personally reviewed all pertinent imaging studies.

## 2018-12-21 NOTE — PROGRESS NOTES
Patient transported off unit via stretcher to HD. In NAD.     1800 Patient returned to unit from HD.

## 2018-12-21 NOTE — ASSESSMENT & PLAN NOTE
- Endocrine consulted for management. Appreciate recs.   - Hypoglycemia 12/10 requiring D50.    - Repeat cx 12/11 - no growth

## 2018-12-21 NOTE — HPI
Mr Enciso is a 53 year old man with history of ETOH ESLD s/p OLT, ESRD, s/p OLT 11/11, GI consulted for consideration of EGD.    HPI per team  Femi Enciso is a 53yr old male with PMH of acute ETOH hepatitis and DEL/ATN evolved to ESRD requiring HD (not candidate for KTX). He is now s/p liver transplant (SM induction, DBD, CMV D+/R-). Transplant surgery noted severe collateralization, adrenal gland firmly adhered to liver. Bare area of adrenal gland required several stitches and packing to obtain fair hemostasis (EBL 15L). OR take back 11/16 for bleeding from R adrenal bed in AM (significant clot in retroperitoneum, posterior to R hepatic lobe, tract posterior to the R kidney containing significant clot, small bleeding area of retroperitoneal fat) then returned to surgery same day for hemorrhaging closed with wound vac with plans to return to OR 24-48 hours for closure (retroperitoneal /retrorenal/retrocolic hematoma on the right ). Raw retroperitoneal bleeding. Suture ligatures placed, argon beam cautery, evarest placed in retroperitoneum behind cava and right kidney. Significant oozing from upper right adrenal gland, not amenable to ligation, that portion of the adrenal gland was resected with cautery). OR take back 11/18 for washout and incisional closure, no significant bleeding or hematoma found. OR cultures 11/18  from body fluid grew enterococcus faecium VRE. ID was consulted, 11/21 started on daptomycin for VRE treatment and cefepime/flagyl to cover for IA ty in bile. Patient with significant leukocytosis with peak on 11/22 at 48K prompting drainage of R subphrenic fluid collection, perc drain placed, culture grew VRE. Stroke code called 11/21 for lethargy and unresponsive, CT head without evidence of acute ischemic changes and CTA chest negative, vascular neurology was consulted recommended MRI brain, but patient's AMS improved shortly after event. Nephrology consulted for ESRD requiring HD. Patient overall  improved on antibiotic regimen, leukocytosis and AMS improved. He was transferred to TSU on POD #15.     Due to concern for poor PO intake GI consulted. He reports that prior to his OLT he was eating without issue. He notes that since his 3rd OR he has had difficulty with tolerating PO. He notes that he has no issues with swallowing or feeling the food becomes stuck or having to vomit up initially. However, he notes that ~20 minutes after eating he develops nausea, sensation of food at lower sternum, followed by vomiting. Able to tolerate liquid with minimal nausea but happens frequently with solid intake. He denies any abdominal pain. Denies constipation, endorsing q2 day BM, including normal BM today. Denies any odynophagia or dysphagia. Denies any other complaints on review.    He was seen by SLP who passed him without any evidence of aspiration or pharyngeal dysphagia but recommended GI testing due to patient's complaints.    Prior Endo Hx  EGD. (11/6/18). Dr. Jules. Indication:Variceal screening.  - Normal examined duodenum.  - Portal hypertensive gastropathy.   - Large (> 5 mm) esophageal varices. Completely eradicated. Banded.

## 2018-12-22 LAB
ALBUMIN SERPL BCP-MCNC: 2.1 G/DL
ALP SERPL-CCNC: 160 U/L
ALT SERPL W/O P-5'-P-CCNC: 10 U/L
ANION GAP SERPL CALC-SCNC: 12 MMOL/L
AST SERPL-CCNC: 11 U/L
BASOPHILS # BLD AUTO: 0.34 K/UL
BASOPHILS NFR BLD: 2.5 %
BILIRUB SERPL-MCNC: 1.3 MG/DL
BUN SERPL-MCNC: 11 MG/DL
CALCIUM SERPL-MCNC: 8.5 MG/DL
CHLORIDE SERPL-SCNC: 106 MMOL/L
CMV DNA SERPL NAA+PROBE-ACNC: NORMAL IU/ML
CO2 SERPL-SCNC: 22 MMOL/L
CREAT SERPL-MCNC: 2 MG/DL
DIFFERENTIAL METHOD: ABNORMAL
EOSINOPHIL # BLD AUTO: 0.8 K/UL
EOSINOPHIL NFR BLD: 5.9 %
ERYTHROCYTE [DISTWIDTH] IN BLOOD BY AUTOMATED COUNT: 15.9 %
EST. GFR  (AFRICAN AMERICAN): 42.8 ML/MIN/1.73 M^2
EST. GFR  (NON AFRICAN AMERICAN): 37 ML/MIN/1.73 M^2
GLUCOSE SERPL-MCNC: 74 MG/DL
HCT VFR BLD AUTO: 29.6 %
HGB BLD-MCNC: 9 G/DL
IMM GRANULOCYTES # BLD AUTO: 0.26 K/UL
IMM GRANULOCYTES NFR BLD AUTO: 1.9 %
LYMPHOCYTES # BLD AUTO: 1.2 K/UL
LYMPHOCYTES NFR BLD: 8.4 %
MAGNESIUM SERPL-MCNC: 1.8 MG/DL
MCH RBC QN AUTO: 28.1 PG
MCHC RBC AUTO-ENTMCNC: 30.4 G/DL
MCV RBC AUTO: 93 FL
MONOCYTES # BLD AUTO: 2.1 K/UL
MONOCYTES NFR BLD: 14.9 %
NEUTROPHILS # BLD AUTO: 9.2 K/UL
NEUTROPHILS NFR BLD: 66.4 %
NRBC BLD-RTO: 0 /100 WBC
PHOSPHATE SERPL-MCNC: 2.5 MG/DL
PLATELET # BLD AUTO: 332 K/UL
PMV BLD AUTO: 10.6 FL
POCT GLUCOSE: 102 MG/DL (ref 70–110)
POCT GLUCOSE: 79 MG/DL (ref 70–110)
POCT GLUCOSE: 87 MG/DL (ref 70–110)
POCT GLUCOSE: 99 MG/DL (ref 70–110)
POTASSIUM SERPL-SCNC: 3.3 MMOL/L
PROT SERPL-MCNC: 5.7 G/DL
RBC # BLD AUTO: 3.2 M/UL
SODIUM SERPL-SCNC: 140 MMOL/L
TACROLIMUS BLD-MCNC: 6.4 NG/ML
WBC # BLD AUTO: 13.77 K/UL

## 2018-12-22 PROCEDURE — 20600001 HC STEP DOWN PRIVATE ROOM

## 2018-12-22 PROCEDURE — 25000003 PHARM REV CODE 250: Performed by: NURSE PRACTITIONER

## 2018-12-22 PROCEDURE — 25000003 PHARM REV CODE 250: Performed by: PHYSICIAN ASSISTANT

## 2018-12-22 PROCEDURE — 99233 SBSQ HOSP IP/OBS HIGH 50: CPT | Mod: 24,,, | Performed by: PHYSICIAN ASSISTANT

## 2018-12-22 PROCEDURE — 99233 PR SUBSEQUENT HOSPITAL CARE,LEVL III: ICD-10-PCS | Mod: 24,,, | Performed by: PHYSICIAN ASSISTANT

## 2018-12-22 PROCEDURE — 63600175 PHARM REV CODE 636 W HCPCS: Performed by: NURSE PRACTITIONER

## 2018-12-22 PROCEDURE — 94799 UNLISTED PULMONARY SVC/PX: CPT

## 2018-12-22 PROCEDURE — 84100 ASSAY OF PHOSPHORUS: CPT

## 2018-12-22 PROCEDURE — 80197 ASSAY OF TACROLIMUS: CPT

## 2018-12-22 PROCEDURE — 85025 COMPLETE CBC W/AUTO DIFF WBC: CPT

## 2018-12-22 PROCEDURE — 36415 COLL VENOUS BLD VENIPUNCTURE: CPT

## 2018-12-22 PROCEDURE — 83735 ASSAY OF MAGNESIUM: CPT

## 2018-12-22 PROCEDURE — 80053 COMPREHEN METABOLIC PANEL: CPT

## 2018-12-22 PROCEDURE — 63600175 PHARM REV CODE 636 W HCPCS: Performed by: STUDENT IN AN ORGANIZED HEALTH CARE EDUCATION/TRAINING PROGRAM

## 2018-12-22 PROCEDURE — 63600175 PHARM REV CODE 636 W HCPCS: Performed by: TRANSPLANT SURGERY

## 2018-12-22 PROCEDURE — 27000646 HC AEROBIKA DEVICE

## 2018-12-22 PROCEDURE — 25000003 PHARM REV CODE 250: Performed by: STUDENT IN AN ORGANIZED HEALTH CARE EDUCATION/TRAINING PROGRAM

## 2018-12-22 PROCEDURE — 94664 DEMO&/EVAL PT USE INHALER: CPT

## 2018-12-22 PROCEDURE — 99900035 HC TECH TIME PER 15 MIN (STAT)

## 2018-12-22 RX ADMIN — LIDOCAINE 1 PATCH: 50 PATCH CUTANEOUS at 07:12

## 2018-12-22 RX ADMIN — URSODIOL 300 MG: 300 CAPSULE ORAL at 08:12

## 2018-12-22 RX ADMIN — URSODIOL 300 MG: 300 CAPSULE ORAL at 09:12

## 2018-12-22 RX ADMIN — ISAVUCONAZONIUM SULFATE 372 MG: 186 CAPSULE ORAL at 08:12

## 2018-12-22 RX ADMIN — TACROLIMUS 2 MG: 1 CAPSULE ORAL at 05:12

## 2018-12-22 RX ADMIN — PANTOPRAZOLE SODIUM 40 MG: 40 TABLET, DELAYED RELEASE ORAL at 05:12

## 2018-12-22 RX ADMIN — TIZANIDINE 2 MG: 2 TABLET ORAL at 09:12

## 2018-12-22 RX ADMIN — PANTOPRAZOLE SODIUM 40 MG: 40 TABLET, DELAYED RELEASE ORAL at 06:12

## 2018-12-22 RX ADMIN — TACROLIMUS 2 MG: 1 CAPSULE ORAL at 08:12

## 2018-12-22 RX ADMIN — HEPARIN SODIUM 5000 UNITS: 5000 INJECTION, SOLUTION INTRAVENOUS; SUBCUTANEOUS at 06:12

## 2018-12-22 RX ADMIN — ESCITALOPRAM OXALATE 20 MG: 10 TABLET ORAL at 09:12

## 2018-12-22 RX ADMIN — HEPARIN SODIUM 5000 UNITS: 5000 INJECTION, SOLUTION INTRAVENOUS; SUBCUTANEOUS at 09:12

## 2018-12-22 RX ADMIN — ASPIRIN 81 MG CHEWABLE TABLET 81 MG: 81 TABLET CHEWABLE at 08:12

## 2018-12-22 RX ADMIN — ONDANSETRON 4 MG: 2 INJECTION INTRAMUSCULAR; INTRAVENOUS at 12:12

## 2018-12-22 RX ADMIN — HEPARIN SODIUM 5000 UNITS: 5000 INJECTION, SOLUTION INTRAVENOUS; SUBCUTANEOUS at 02:12

## 2018-12-22 RX ADMIN — PREDNISONE 2.5 MG: 2.5 TABLET ORAL at 08:12

## 2018-12-22 RX ADMIN — MIRTAZAPINE 7.5 MG: 7.5 TABLET ORAL at 09:12

## 2018-12-22 NOTE — ASSESSMENT & PLAN NOTE
- Continue appetite stimulant.  - GI consulted as pt c/o burning sensation in chest/esophagus + vomiting after eating, passed SLP eval, recommended GI f/u.  - Cont to encourage PO intake. Ordered additional supplements.   - EGD planned for Monday, 12/24.

## 2018-12-22 NOTE — PLAN OF CARE
Problem: Patient Care Overview  Goal: Plan of Care Review  Outcome: Ongoing (interventions implemented as appropriate)  Patient dialyzed today - 1.5L removed. Patient with c/o chest pain this am which resolved - EKG showed NSR, troponin negative. Calorie count ordered by dietary. Patient with very poor appetite for breakfast and lunch - drank only some Boost/supplment and states he feels nauseated often after eating. GI consulted - EGD planned for Monday to r/o thrush in his throat. OOB to chair with PT. Assisted back to bed by RN and PCT - patient very weak and unable to bear weight upon transfer. Patient is able to turn/change positions in bed and is encouraged to do so - also encouraged to use IS.

## 2018-12-22 NOTE — PROGRESS NOTES
Tx ended. NAD noted. Removed 1.5L. RT. Chest CVC ports flushed, heparinized and capped. Secured with tape.

## 2018-12-22 NOTE — PROGRESS NOTES
[facility]  Liver Transplant  Progress Note    Patient Name: Femi Enciso  MRN: [id number]  Admission Date: 10/27/2018  Hospital Length of Stay: 56 days  Code Status: Full Code  Primary Care Provider: Primary Doctor No  Post-Operative Day: 41    ORGAN:   LIVER  Disease Etiology: Alcoholic Cirrhosis  Donor Type:    - Brain Death  CDC High Risk:   No  Donor CMV Status:   Donor CMV Status: Positive  Donor HBcAB:   Negative  Donor HCV Status:   Negative  Donor HBV JAVIER: Negative  Donor HCV JAVIER: Negative  Whole or Partial: Whole Liver  Biliary Anastomosis: End to End  Arterial Anatomy: Standard  Subjective:     History of Present Illness:  Femi Enciso is a 53yr old male with PMH of acute ETOH hepatitis and DEL/ATN evolved to ESRD requiring HD (not candidate for KTX). He is now s/p liver transplant (SM induction, DBD, CMV D+/R-). Transplant surgery noted severe collateralization, adrenal gland firmly adhered to liver. Bare area of adrenal gland required several stitches and packing to obtain fair hemostasis (EBL 15L). OR take back  for bleeding from R adrenal bed in AM (significant clot in retroperitoneum, posterior to R hepatic lobe, tract posterior to the R kidney containing significant clot, small bleeding area of retroperitoneal fat) then returned to surgery same day for hemorrhaging closed with wound vac with plans to return to OR 24-48 hours for closure (retroperitoneal /retrorenal/retrocolic hematoma on the right ). Raw retroperitoneal bleeding. Suture ligatures placed, argon beam cautery, evarest placed in retroperitoneum behind cava and right kidney. Significant oozing from upper right adrenal gland, not amenable to ligation, that portion of the adrenal gland was resected with cautery). OR take back  for washout and incisional closure, no significant bleeding or hematoma found. OR cultures   from body fluid grew enterococcus faecium VRE. ID was consulted,  started on  daptomycin for VRE treatment and cefepime/flagyl to cover for IA ty in bile. Patient with significant leukocytosis with peak on 11/22 at 48K prompting drainage of R subphrenic fluid collection, perc drain placed, culture grew VRE. Stroke code called 11/21 for lethargy and unresponsive, CT head without evidence of acute ischemic changes and CTA chest negative, vascular neurology was consulted recommended MRI brain, but patient's AMS improved shortly after event. Nephrology consulted for ESRD requiring HD. Patient overall improved on antibiotic regimen, leukocytosis and AMS improved. He was transferred to TSU on POD #15.     Interval History:  No acute events overnight. LFTs stable. Leukocytosis persists at 13.8k today. Blood cx sent 12/19 with NGTD. Pt c/o CP yest AM. EKG stable from prior. Troponin wnl. CP resolved today. Afebrile. CXR 12/20 showed no detrimental change. Dialysis tolerated well yesterday with 1.5L taken off. Appetite stimulant started 12/19. Caregivers to bring in outside food for patient. He was seen by SLP 12/20 who passed him without any evidence of aspiration or pharyngeal dysphagia, but recommended GI testing due to pt's complaints of burning sensation and reflux/vomiting after eating. GI consulted and saw pt yesterday. Appreciate recs. Increased Protonix to BID and will plan for EGD on Monday 12/24. Meanwhile, continue working on nutrition status, calorie count, and aggressive PT/OT.       Scheduled Meds:   sodium chloride 0.9%   Intravenous Once    aspirin  81 mg Oral Daily    epoetin sherlyn (PROCRIT) injection  50 Units/kg (Dosing Weight) Intravenous Every Mon, Wed, Fri    ergocalciferol  50,000 Units Oral Q7 Days    escitalopram oxalate  20 mg Oral Nightly    heparin (porcine)  5,000 Units Subcutaneous Q8H    isavuconazonium sulfate  372 mg Oral Daily    lidocaine  1 patch Transdermal Q24H    mirtazapine  7.5 mg Oral QHS    pantoprazole  40 mg Oral BID AC    predniSONE  2.5 mg  Oral Daily    sulfamethoxazole-trimethoprim 400-80mg  1 tablet Oral Every Mon, Wed, Fri    tacrolimus  2 mg Oral BID    ursodiol  300 mg Oral BID    valganciclovir 50 mg/ml  100 mg Oral Once per day on Mon Wed Fri     Continuous Infusions:  PRN Meds:sodium chloride 0.9%, acetaminophen, albuterol-ipratropium, artificial tears, bacitracin, bisacodyl, dextrose 50%, dextrose 50%, glucagon (human recombinant), glucose, glucose, heparin (porcine), midodrine, ondansetron, ramelteon, tiZANidine, traMADol    Review of Systems   Constitutional: Positive for activity change and appetite change (decreased). Negative for fatigue and fever.   HENT: Negative for congestion, facial swelling, sore throat and voice change.    Eyes: Negative for pain, discharge and visual disturbance.   Respiratory: Negative for apnea, cough, choking, chest tightness, shortness of breath and wheezing.    Cardiovascular: Negative for chest pain, palpitations and leg swelling.   Gastrointestinal: Positive for nausea. Negative for abdominal distention, abdominal pain, constipation, diarrhea and vomiting.   Endocrine: Negative.    Genitourinary: Positive for decreased urine volume. Negative for difficulty urinating, dysuria, hematuria and urgency.   Musculoskeletal: Negative.  Negative for back pain, gait problem, neck pain and neck stiffness.   Skin: Positive for wound. Negative for color change and pallor.   Allergic/Immunologic: Positive for immunocompromised state.   Neurological: Positive for weakness. Negative for dizziness, seizures, light-headedness and headaches.   Psychiatric/Behavioral: Negative for behavioral problems, confusion, decreased concentration, dysphoric mood, hallucinations, sleep disturbance and suicidal ideas. The patient is not nervous/anxious.      Objective:     Vital Signs (Most Recent):  Temp: 98.5 °F (36.9 °C) (12/22/18 1132)  Pulse: 93 (12/22/18 0800)  Resp: (!) 21 (12/22/18 0800)  BP: (!) 141/95 (12/22/18 0800)  SpO2:  99 % (12/22/18 0800) Vital Signs (24h Range):  Temp:  [97.8 °F (36.6 °C)-98.5 °F (36.9 °C)] 98.5 °F (36.9 °C)  Pulse:  [84-97] 93  Resp:  [20-25] 21  SpO2:  [98 %-100 %] 99 %  BP: (108-145)/(79-97) 141/95     Weight: 66.1 kg (145 lb 11.6 oz)  Body mass index is 20.91 kg/m².    Intake/Output - Last 3 Shifts       12/20 0700 - 12/21 0659 12/21 0700 - 12/22 0659 12/22 0700 - 12/23 0659    P.O. 110 120     I.V. (mL/kg) 100 (1.4) 50 (0.8)     Other  600     Total Intake(mL/kg) 210 (2.9) 770 (11.6)     Urine (mL/kg/hr) 0 (0) 15 (0) 100 (0.3)    Emesis/NG output 0      Other  2148     Stool 0 0 0    Total Output 0 2163 100    Net +210 -1393 -100           Urine Occurrence 0 x      Stool Occurrence 1 x 0 x 1 x          Physical Exam   Constitutional: He is oriented to person, place, and time. He appears well-developed. No distress.   Temporal and distal extremity muscle wasting noted.   HENT:   Head: Normocephalic and atraumatic.   Mouth/Throat: Oropharynx is clear and moist. No oropharyngeal exudate.   Eyes: EOM are normal.   Neck: Normal range of motion. Neck supple. No JVD present.   Cardiovascular: Normal rate, regular rhythm, normal heart sounds and intact distal pulses.   No murmur heard.  Pulmonary/Chest: Effort normal. No respiratory distress. He has decreased breath sounds in the right lower field and the left lower field. He has no wheezes. He exhibits no tenderness.   Abdominal: Soft. Bowel sounds are normal. He exhibits no distension. There is no tenderness.   Chevron inc clean, dry, intact with steri strips in place     Musculoskeletal: Normal range of motion. He exhibits no edema or tenderness.   Neurological: He is alert and oriented to person, place, and time. He has normal reflexes.   Skin: Skin is warm and dry. Capillary refill takes 2 to 3 seconds. He is not diaphoretic. No pallor.   Psychiatric: He has a normal mood and affect. His behavior is normal. Thought content normal. His affect is not labile. He  "is not slowed. Cognition and memory are not impaired.   Nursing note and vitals reviewed.      Laboratory:  Immunosuppressants         Stop Route Frequency     tacrolimus capsule 2 mg      -- Oral 2 times daily        CBC:   Recent Labs   Lab 12/22/18  0702   WBC 13.77*   RBC 3.20*   HGB 9.0*   HCT 29.6*      MCV 93   MCH 28.1   MCHC 30.4*     CMP:   Recent Labs   Lab 12/22/18  0702   GLU 74   CALCIUM 8.5*   ALBUMIN 2.1*   PROT 5.7*      K 3.3*   CO2 22*      BUN 11   CREATININE 2.0*   ALKPHOS 160*   ALT 10   AST 11   BILITOT 1.3*     Labs within the past 24 hours have been reviewed.    Diagnostic Results:  I have personally reviewed all pertinent imaging studies.    Assessment/Plan:     * S/P liver transplant    - LFTs now improving slowly.  T bili was 37.6 day of transplant.  - Drain placed during take back. Drain placed to intra-abdominal abscess on 11/22.   - Fluid from collection noted with enterococcus VRE completed Zyvox treatment.  - Routine month 1 Liver US obtained 12/17 which showed elevated velocity of hepatic artery and low RIs.   - Vascular Surgery consulted. Recommend repeat US in 10-14 days (12/27-12/31) to reassess. Appreciate Vascular assistance.        Chest pain    - Pt c/o "burning" diffuse CP 12/21 AM  - EKG NSR, stable from prior  - Troponin wnl  - Likely GI related pain. GI consulted, EGD planned for 12/24.       Hypophosphatemia    - Continue to monitor.         Acute kidney failure with lesion of tubular necrosis    - Cont Dialysis MWF.  - Nephrology following, appreciate recs.  - Monitor.     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       Hypomagnesemia    - Continue to monitor & replace as needed.     Severe malnutrition    - Dietician following. Severe temporal, clavicle muscle depletion noted. Severe subq fat loss  - Nutrition has been poor over the past 24 hours.   - Discussed at length with patient and caregivers " that if patient's nutrition status does not improve, will need supplemental nutrition via tube feeds or TPN.  - Appetite stimulant started 12/19.   - Caregivers to bring in outside food for patient.   - Continue calorie count.  - EGD Monday 12/24 as pt c/o poor appetite + burning in chest/esophagus and occasionally vomiting after eating x several weeks.   - If no improvement in po intake in next few days, will need to start tube feeds.     Anxiety    - Restarted Lexapro as per psych recs, 12/13.   - Monitor.     Prolonged Q-T interval on ECG    -  ms on EKG 12/19.  -  on EKG 12/21.  - Monitor.       Alcohol use disorder, severe, in early remission, dependence    - Monitor.       Decreased appetite    - Continue appetite stimulant.  - GI consulted as pt c/o burning sensation in chest/esophagus + vomiting after eating, passed SLP eval, recommended GI f/u.  - Cont to encourage PO intake. Ordered additional supplements.   - EGD planned for Monday, 12/24.       Acute renal failure with acute tubular necrosis superimposed on stage 3 chronic kidney disease    - 2 weeks for DEL prior to transplant  - Renal function slow to recover post-op.   - Nephrology consulted for management.   - Cont with HD as per nephrology recs.  Apprec recs.    - Lasix 160 mg challenge 11/28 w/ minimal output.    - HD MWF.   - Strict I&Os.      Intra-abdominal abscess    - Significant increase in WBC post-op.   - OR cultures from 11/18 noted with enterococcus VRE.   - Abdominal CT performed at that time with fluid collection and drain placed on 11/22 for drainage.   - Intra-abdominal abscess culture also with enterococcus VRE.   - ID consulted and pt placed on antibxs for treatment. Pt was on Cefepime, Daptomycin, and Fluconazole for treatment.    - Pt remains with RLQ drains (one JOSE A and one Percutaneous).  One JOSE A removed 11/28.  Perc drain in placed draining tan, clear drainage.  WBC slowly improving.   - Liver US on 11/26 reviewed.    - Abdominal CT performed 11/27 and reviewed. Fluid collection noted with improvement on CT.  ID following and reassured by findings.    - Dapto, Cefepime and Flagyl changed 11/30/18 to Linezolid 600 mg PO q 12 hr x 10 days per ID.     - Added back cefepime after thora.  ID re consulted.  - Completed Zyvox x 10 days per ID thru 12/9/18. Monitor.        Long-term use of immunosuppressant medication    - Cont with prograf. Draw prograf level daily and adjust dose as needed to maintain a therapeutic level.        At risk for opportunistic infections    - Cont with bactrim and valcyte for protection against opportunistic infections.   - Cont Isavuconazonium for fungal prophylaxis.     Leukocytosis    - See intra-abdominal abscess.  - wbc improving, cont w delirium.    - Repeat cx 12/4 with NGTD.  Completed Linezolid and Cefepime per ID.    - Wbc trended up 12/11- repeat cx 12/11.  - WBC w/ improvement 12/12  - WBC again elevated at 13k on 12/20 and 12/21. 13.8k 12/22. Blood cx 12/19 NGTD. Afebrile. CXR no detrimental change. Thought possibly related to dehydration. Will encourage PO intake and continue to monitor. EGD Mon 12/24.     Adrenal cortical steroids causing adverse effect in therapeutic use    - Monitor.       Prophylactic immunotherapy    - See long term immunosuppression.        Debility    - Pt remains significantly debilitated.   - PT/OT for strengthening.   - Currently will need SNF for placement and further rehabilitation.   - Pt remains severely debilitated and not strong enough for SNF at this time.    - Cont with strengthening  - SNF consult placed 12/17. However, denied for SNF again as not strong enough at this time  - Rehab consulted, following.        Pleural effusion associated with hepatic disorder    - c/o increased pain w deep breath today to right side.  CXR showed increased opacity in the inferior hemothorax on the right side since 11/26/2018.  Pulse ox 97-99% on RA.  Cont to monitor.   -  continues to have fluid to RLL.  WBC remains elevated.    - thoracentesis performed on 11/30. Fluid noted with 4k WBC, 93 SEGS. cx no growth to date.  Cefepime added for treatment.  - Chest CT showing right pleural effusion improved, left w increased pleural effusion.   - Per ID, completed treatment of empyema w Cefepime thru 12/8.  - sats remain 97-99% on RA.  - CXR 12/20 showed left hemidiaphragmatic margin that is better delineated than on 12/19/2018, consistent with improved aeration in the left lower lobe. + no significant detrimental interval change in the appearance of the chest, with the current examination demonstrating a slightly improved inspiratory depth level.       Type 2 diabetes mellitus without complication    - Endocrine consulted for management. Appreciate recs.   - Hypoglycemia 12/10 requiring D50.    - Repeat cx 12/11 - no growth       Anemia of chronic disease    - H&H stable. Monitor with daily labs.   - Chest/Abd/pelvis CT 12/4- no fluid to be drainged per transplant surgeon.  No source of bleeding.     - Last liver US 11/27. Evolving peritransplant hematomas with anechoic fluid collection adjacent to the right hepatic lobe.  Small volume perihepatic free fluid.         VTE Risk Mitigation (From admission, onward)        Ordered     heparin (porcine) injection 1,000 Units  As needed (PRN)      12/12/18 1731     heparin (porcine) injection 5,000 Units  Every 8 hours      11/30/18 1149     IP VTE HIGH RISK PATIENT  Once      11/11/18 1208          The patients clinical status was discussed at multidisplinary rounds, involving transplant surgery, transplant medicine, pharmacy, nursing, nutrition, and social work    Discharge Planning:  No Patient Care Coordination Note on file.      Therese Vu PA-C  Liver Transplant  [facility]

## 2018-12-22 NOTE — CONSULTS
"  Nutrition consult stating "s/p oltx, poor intake."  Please see note from 12/17 for full RD assessment; RD to follow-up 12/24.    Recommendation/Intervention: 1. Continue low potassium diet as needed, then ADAT to regular diet. 2. Add Novasource ONS to aid in caloric intake.  Goals: 1. Pt to receive % EEN and EPN during stay.  Nutrition Goal Status: goal not met  Communication of RD Recs: reviewed with RN(reviewed with PA)    Thanks! ANNE Parry      "

## 2018-12-22 NOTE — SUBJECTIVE & OBJECTIVE
Scheduled Meds:   sodium chloride 0.9%   Intravenous Once    aspirin  81 mg Oral Daily    epoetin sherlyn (PROCRIT) injection  50 Units/kg (Dosing Weight) Intravenous Every Mon, Wed, Fri    ergocalciferol  50,000 Units Oral Q7 Days    escitalopram oxalate  20 mg Oral Nightly    heparin (porcine)  5,000 Units Subcutaneous Q8H    isavuconazonium sulfate  372 mg Oral Daily    lidocaine  1 patch Transdermal Q24H    mirtazapine  7.5 mg Oral QHS    pantoprazole  40 mg Oral BID AC    predniSONE  2.5 mg Oral Daily    sulfamethoxazole-trimethoprim 400-80mg  1 tablet Oral Every Mon, Wed, Fri    tacrolimus  2 mg Oral BID    ursodiol  300 mg Oral BID    valganciclovir 50 mg/ml  100 mg Oral Once per day on Mon Wed Fri     Continuous Infusions:  PRN Meds:sodium chloride 0.9%, acetaminophen, albuterol-ipratropium, artificial tears, bacitracin, bisacodyl, dextrose 50%, dextrose 50%, glucagon (human recombinant), glucose, glucose, heparin (porcine), midodrine, ondansetron, ramelteon, tiZANidine, traMADol    Review of Systems   Constitutional: Positive for activity change and appetite change (decreased). Negative for fatigue and fever.   HENT: Negative for congestion, facial swelling, sore throat and voice change.    Eyes: Negative for pain, discharge and visual disturbance.   Respiratory: Negative for apnea, cough, choking, chest tightness, shortness of breath and wheezing.    Cardiovascular: Negative for chest pain, palpitations and leg swelling.   Gastrointestinal: Positive for nausea. Negative for abdominal distention, abdominal pain, constipation, diarrhea and vomiting.   Endocrine: Negative.    Genitourinary: Positive for decreased urine volume. Negative for difficulty urinating, dysuria, hematuria and urgency.   Musculoskeletal: Negative.  Negative for back pain, gait problem, neck pain and neck stiffness.   Skin: Positive for wound. Negative for color change and pallor.   Allergic/Immunologic: Positive for  immunocompromised state.   Neurological: Positive for weakness. Negative for dizziness, seizures, light-headedness and headaches.   Psychiatric/Behavioral: Negative for behavioral problems, confusion, decreased concentration, dysphoric mood, hallucinations, sleep disturbance and suicidal ideas. The patient is not nervous/anxious.      Objective:     Vital Signs (Most Recent):  Temp: 98.5 °F (36.9 °C) (12/22/18 1132)  Pulse: 93 (12/22/18 0800)  Resp: (!) 21 (12/22/18 0800)  BP: (!) 141/95 (12/22/18 0800)  SpO2: 99 % (12/22/18 0800) Vital Signs (24h Range):  Temp:  [97.8 °F (36.6 °C)-98.5 °F (36.9 °C)] 98.5 °F (36.9 °C)  Pulse:  [84-97] 93  Resp:  [20-25] 21  SpO2:  [98 %-100 %] 99 %  BP: (108-145)/(79-97) 141/95     Weight: 66.1 kg (145 lb 11.6 oz)  Body mass index is 20.91 kg/m².    Intake/Output - Last 3 Shifts       12/20 0700 - 12/21 0659 12/21 0700 - 12/22 0659 12/22 0700 - 12/23 0659    P.O. 110 120     I.V. (mL/kg) 100 (1.4) 50 (0.8)     Other  600     Total Intake(mL/kg) 210 (2.9) 770 (11.6)     Urine (mL/kg/hr) 0 (0) 15 (0) 100 (0.3)    Emesis/NG output 0      Other  2148     Stool 0 0 0    Total Output 0 2163 100    Net +210 -1393 -100           Urine Occurrence 0 x      Stool Occurrence 1 x 0 x 1 x          Physical Exam   Constitutional: He is oriented to person, place, and time. He appears well-developed. No distress.   Temporal and distal extremity muscle wasting noted.   HENT:   Head: Normocephalic and atraumatic.   Mouth/Throat: Oropharynx is clear and moist. No oropharyngeal exudate.   Eyes: EOM are normal.   Neck: Normal range of motion. Neck supple. No JVD present.   Cardiovascular: Normal rate, regular rhythm, normal heart sounds and intact distal pulses.   No murmur heard.  Pulmonary/Chest: Effort normal. No respiratory distress. He has decreased breath sounds in the right lower field and the left lower field. He has no wheezes. He exhibits no tenderness.   Abdominal: Soft. Bowel sounds are  normal. He exhibits no distension. There is no tenderness.   Chevron inc clean, dry, intact with steri strips in place     Musculoskeletal: Normal range of motion. He exhibits no edema or tenderness.   Neurological: He is alert and oriented to person, place, and time. He has normal reflexes.   Skin: Skin is warm and dry. Capillary refill takes 2 to 3 seconds. He is not diaphoretic. No pallor.   Psychiatric: He has a normal mood and affect. His behavior is normal. Thought content normal. His affect is not labile. He is not slowed. Cognition and memory are not impaired.   Nursing note and vitals reviewed.      Laboratory:  Immunosuppressants         Stop Route Frequency     tacrolimus capsule 2 mg      -- Oral 2 times daily        CBC:   Recent Labs   Lab 12/22/18  0702   WBC 13.77*   RBC 3.20*   HGB 9.0*   HCT 29.6*      MCV 93   MCH 28.1   MCHC 30.4*     CMP:   Recent Labs   Lab 12/22/18  0702   GLU 74   CALCIUM 8.5*   ALBUMIN 2.1*   PROT 5.7*      K 3.3*   CO2 22*      BUN 11   CREATININE 2.0*   ALKPHOS 160*   ALT 10   AST 11   BILITOT 1.3*     Labs within the past 24 hours have been reviewed.    Diagnostic Results:  I have personally reviewed all pertinent imaging studies.

## 2018-12-22 NOTE — ASSESSMENT & PLAN NOTE
- Cont with bactrim and valcyte for protection against opportunistic infections.   - Cont Isavuconazonium for fungal prophylaxis.

## 2018-12-22 NOTE — PLAN OF CARE
Problem: Patient Care Overview  Goal: Plan of Care Review  Outcome: Ongoing (interventions implemented as appropriate)  * AAO x 4  * Blood glucose monitoring AC & HS. Blood glucose reading 82 no sliding scale needed per MAR. See chart for all blood glucose readings.  * 2 gm Na diet patient not eating no appetite noted. See chart for % eaten.   * No edema noted.  * Fall on 12/18/18, no injury noted.   * Bed at lowest position and locked, call light within reach, non-slip socks on, and side rails up x 2.  Encouraged patient to call for assistance when needed patient stated understanding.  * [facility] perm-a-cath (11/7/18) patent, dressing clean dry and intact no signs or symptoms of infection noted. Dressing due to be changed on 12/22/18.  * Right upper arm midline (12/3/18) patent, dressing clean dry and intact no signs or symptoms of infection noted. Dressing due to be changed on 12/22/18. Site to be changed on 12/31/18.  * Jaundice noted the skin and sclera.  * Oxygen saturation 95 % on room air.  Respirations even and unlabored no signs or symptoms of distress noted.  * Patient has no complaints of pain at this time.  * Self medications preformed by wife who is not at bedside, blue card updated medication bags refill. All questions and concerns addressed by this RN.   * Skin assessment preformed no breakdown noted.  * Standard precautions maintained.  * Patient able to turn self no assistance needed.  * No urine output noted patient is anuric and on HD MWF.  * Visi continuous monitoring in use, see flow sheet for readings

## 2018-12-22 NOTE — ASSESSMENT & PLAN NOTE
- See intra-abdominal abscess.  - wbc improving, cont w delirium.    - Repeat cx 12/4 with NGTD.  Completed Linezolid and Cefepime per ID.    - Wbc trended up 12/11- repeat cx 12/11.  - WBC w/ improvement 12/12  - WBC again elevated at 13k on 12/20 and 12/21. 13.8k 12/22. Blood cx 12/19 NGTD. Afebrile. CXR no detrimental change. Thought possibly related to dehydration. Will encourage PO intake and continue to monitor. EGD Mon 12/24.

## 2018-12-23 LAB
ALBUMIN SERPL BCP-MCNC: 2.1 G/DL
ALP SERPL-CCNC: 163 U/L
ALT SERPL W/O P-5'-P-CCNC: 11 U/L
ANION GAP SERPL CALC-SCNC: 9 MMOL/L
AST SERPL-CCNC: 11 U/L
BASOPHILS # BLD AUTO: 0.26 K/UL
BASOPHILS NFR BLD: 2 %
BILIRUB SERPL-MCNC: 1.4 MG/DL
BUN SERPL-MCNC: 16 MG/DL
CALCIUM SERPL-MCNC: 8.7 MG/DL
CHLORIDE SERPL-SCNC: 105 MMOL/L
CO2 SERPL-SCNC: 25 MMOL/L
CREAT SERPL-MCNC: 2.5 MG/DL
DIFFERENTIAL METHOD: ABNORMAL
EOSINOPHIL # BLD AUTO: 0.8 K/UL
EOSINOPHIL NFR BLD: 5.9 %
ERYTHROCYTE [DISTWIDTH] IN BLOOD BY AUTOMATED COUNT: 15.8 %
EST. GFR  (AFRICAN AMERICAN): 32.7 ML/MIN/1.73 M^2
EST. GFR  (NON AFRICAN AMERICAN): 28.3 ML/MIN/1.73 M^2
GLUCOSE SERPL-MCNC: 88 MG/DL
HCT VFR BLD AUTO: 28.4 %
HGB BLD-MCNC: 9 G/DL
IMM GRANULOCYTES # BLD AUTO: 0.22 K/UL
IMM GRANULOCYTES NFR BLD AUTO: 1.7 %
LYMPHOCYTES # BLD AUTO: 0.9 K/UL
LYMPHOCYTES NFR BLD: 6.9 %
MAGNESIUM SERPL-MCNC: 1.7 MG/DL
MCH RBC QN AUTO: 29.2 PG
MCHC RBC AUTO-ENTMCNC: 31.7 G/DL
MCV RBC AUTO: 92 FL
MONOCYTES # BLD AUTO: 2 K/UL
MONOCYTES NFR BLD: 15.5 %
NEUTROPHILS # BLD AUTO: 8.9 K/UL
NEUTROPHILS NFR BLD: 68 %
NRBC BLD-RTO: 0 /100 WBC
PHOSPHATE SERPL-MCNC: 3.1 MG/DL
PLATELET # BLD AUTO: 286 K/UL
PMV BLD AUTO: 10.8 FL
POCT GLUCOSE: 90 MG/DL (ref 70–110)
POCT GLUCOSE: 93 MG/DL (ref 70–110)
POCT GLUCOSE: 96 MG/DL (ref 70–110)
POCT GLUCOSE: 99 MG/DL (ref 70–110)
POTASSIUM SERPL-SCNC: 3.6 MMOL/L
PROT SERPL-MCNC: 5.8 G/DL
RBC # BLD AUTO: 3.08 M/UL
SODIUM SERPL-SCNC: 139 MMOL/L
TACROLIMUS BLD-MCNC: 5.5 NG/ML
WBC # BLD AUTO: 13.13 K/UL

## 2018-12-23 PROCEDURE — 27000646 HC AEROBIKA DEVICE

## 2018-12-23 PROCEDURE — 83735 ASSAY OF MAGNESIUM: CPT

## 2018-12-23 PROCEDURE — 99233 SBSQ HOSP IP/OBS HIGH 50: CPT | Mod: 24,,, | Performed by: PHYSICIAN ASSISTANT

## 2018-12-23 PROCEDURE — 63600175 PHARM REV CODE 636 W HCPCS: Performed by: STUDENT IN AN ORGANIZED HEALTH CARE EDUCATION/TRAINING PROGRAM

## 2018-12-23 PROCEDURE — 85025 COMPLETE CBC W/AUTO DIFF WBC: CPT

## 2018-12-23 PROCEDURE — 80053 COMPREHEN METABOLIC PANEL: CPT

## 2018-12-23 PROCEDURE — 63600175 PHARM REV CODE 636 W HCPCS: Performed by: NURSE PRACTITIONER

## 2018-12-23 PROCEDURE — 94664 DEMO&/EVAL PT USE INHALER: CPT

## 2018-12-23 PROCEDURE — 25000003 PHARM REV CODE 250: Performed by: STUDENT IN AN ORGANIZED HEALTH CARE EDUCATION/TRAINING PROGRAM

## 2018-12-23 PROCEDURE — 99233 PR SUBSEQUENT HOSPITAL CARE,LEVL III: ICD-10-PCS | Mod: 24,,, | Performed by: PHYSICIAN ASSISTANT

## 2018-12-23 PROCEDURE — 25000003 PHARM REV CODE 250: Performed by: NURSE PRACTITIONER

## 2018-12-23 PROCEDURE — 36415 COLL VENOUS BLD VENIPUNCTURE: CPT

## 2018-12-23 PROCEDURE — 80197 ASSAY OF TACROLIMUS: CPT

## 2018-12-23 PROCEDURE — 63600175 PHARM REV CODE 636 W HCPCS: Performed by: TRANSPLANT SURGERY

## 2018-12-23 PROCEDURE — 25000003 PHARM REV CODE 250: Performed by: PHYSICIAN ASSISTANT

## 2018-12-23 PROCEDURE — 84100 ASSAY OF PHOSPHORUS: CPT

## 2018-12-23 PROCEDURE — 25000242 PHARM REV CODE 250 ALT 637 W/ HCPCS: Performed by: STUDENT IN AN ORGANIZED HEALTH CARE EDUCATION/TRAINING PROGRAM

## 2018-12-23 PROCEDURE — 99900035 HC TECH TIME PER 15 MIN (STAT)

## 2018-12-23 PROCEDURE — 20600001 HC STEP DOWN PRIVATE ROOM

## 2018-12-23 RX ORDER — SODIUM CHLORIDE 9 MG/ML
INJECTION, SOLUTION INTRAVENOUS
Status: DISCONTINUED | OUTPATIENT
Start: 2018-12-24 | End: 2018-12-26

## 2018-12-23 RX ORDER — HYDRALAZINE HYDROCHLORIDE 20 MG/ML
10 INJECTION INTRAMUSCULAR; INTRAVENOUS EVERY 6 HOURS PRN
Status: DISCONTINUED | OUTPATIENT
Start: 2018-12-23 | End: 2018-12-31

## 2018-12-23 RX ORDER — SODIUM CHLORIDE 9 MG/ML
INJECTION, SOLUTION INTRAVENOUS ONCE
Status: COMPLETED | OUTPATIENT
Start: 2018-12-24 | End: 2018-12-24

## 2018-12-23 RX ADMIN — PANTOPRAZOLE SODIUM 40 MG: 40 TABLET, DELAYED RELEASE ORAL at 06:12

## 2018-12-23 RX ADMIN — URSODIOL 300 MG: 300 CAPSULE ORAL at 08:12

## 2018-12-23 RX ADMIN — ASPIRIN 81 MG CHEWABLE TABLET 81 MG: 81 TABLET CHEWABLE at 08:12

## 2018-12-23 RX ADMIN — HEPARIN SODIUM 5000 UNITS: 5000 INJECTION, SOLUTION INTRAVENOUS; SUBCUTANEOUS at 08:12

## 2018-12-23 RX ADMIN — TACROLIMUS 2 MG: 1 CAPSULE ORAL at 05:12

## 2018-12-23 RX ADMIN — TACROLIMUS 2 MG: 1 CAPSULE ORAL at 08:12

## 2018-12-23 RX ADMIN — PREDNISONE 2.5 MG: 2.5 TABLET ORAL at 08:12

## 2018-12-23 RX ADMIN — HYDRALAZINE HYDROCHLORIDE 10 MG: 20 INJECTION INTRAMUSCULAR; INTRAVENOUS at 08:12

## 2018-12-23 RX ADMIN — ESCITALOPRAM OXALATE 20 MG: 10 TABLET ORAL at 08:12

## 2018-12-23 RX ADMIN — ONDANSETRON 4 MG: 2 INJECTION INTRAMUSCULAR; INTRAVENOUS at 08:12

## 2018-12-23 RX ADMIN — HEPARIN SODIUM 5000 UNITS: 5000 INJECTION, SOLUTION INTRAVENOUS; SUBCUTANEOUS at 01:12

## 2018-12-23 RX ADMIN — PANTOPRAZOLE SODIUM 40 MG: 40 TABLET, DELAYED RELEASE ORAL at 05:12

## 2018-12-23 RX ADMIN — TIZANIDINE 2 MG: 2 TABLET ORAL at 08:12

## 2018-12-23 RX ADMIN — IPRATROPIUM BROMIDE AND ALBUTEROL SULFATE 3 ML: .5; 3 SOLUTION RESPIRATORY (INHALATION) at 08:12

## 2018-12-23 RX ADMIN — MIRTAZAPINE 7.5 MG: 7.5 TABLET ORAL at 08:12

## 2018-12-23 RX ADMIN — HEPARIN SODIUM 5000 UNITS: 5000 INJECTION, SOLUTION INTRAVENOUS; SUBCUTANEOUS at 06:12

## 2018-12-23 RX ADMIN — ISAVUCONAZONIUM SULFATE 372 MG: 186 CAPSULE ORAL at 08:12

## 2018-12-23 NOTE — ASSESSMENT & PLAN NOTE
- Dietician following. Severe temporal, clavicle muscle depletion noted. Severe subq fat loss  - Nutrition has been poor over the past 24 hours.   - Discussed at length with patient and caregivers that if patient's nutrition status does not improve, will need supplemental nutrition via tube feeds or TPN.  - Appetite stimulant started 12/19.   - Caregivers to bring in outside food for patient.   - Continue calorie count.  - EGD Monday 12/24 as pt c/o poor appetite + burning in chest/esophagus and occasionally vomiting after eating x several weeks.   - Asked GI to place prabhakar tube if nothing significant found during EGD - need to follow up with fellow in AM 12/24.

## 2018-12-23 NOTE — ASSESSMENT & PLAN NOTE
- See intra-abdominal abscess.  - wbc improving, cont w delirium.    - Repeat cx 12/4 with NGTD.  Completed Linezolid and Cefepime per ID.    - Wbc trended up 12/11- repeat cx 12/11.  - WBC w/ improvement 12/12  - WBC elevated, but stable ~13k since 12/20. Blood cx 12/19 NGTD. Afebrile. CXR no detrimental change. Thought could be related to dehydration. Will encourage PO intake and continue to monitor. EGD Mon 12/24.

## 2018-12-23 NOTE — PLAN OF CARE
Problem: Patient Care Overview  Goal: Plan of Care Review  Outcome: Ongoing (interventions implemented as appropriate)  Pt AAOx4, afebrile, BPs elevated - spoke with team, they are aware. Pt going for dialysis ioana AM, then having EGD performed. Team requested if no significant findings on EGD, that a [place] tube be placed. Pt still with no food intake, Zofran given x1. Dressing change to R midline. Refused getting up in chair today. Repeat u/s sometime 12/27-12/31. Bed in low/locked position, call light/personal belongings within reach, will cont to monitor.

## 2018-12-23 NOTE — NURSING
Spoke with DAMARIS franklin pt's elevated BPs, team is aware this happens before pt is to be dialyzed - pt to be dialyzed ioana .

## 2018-12-23 NOTE — NURSING
Pts pressures reading 200 SBP on Visi, went in with vitals machine, /108, did manual pressure & /95. Notified tx resident, who said he is ok with it.

## 2018-12-23 NOTE — PROGRESS NOTES
[facility]  Liver Transplant  Progress Note    Patient Name: Femi Enciso  MRN: [id number]  Admission Date: 10/27/2018  Hospital Length of Stay: 57 days  Code Status: Full Code  Primary Care Provider: Primary Doctor No  Post-Operative Day: 42    ORGAN:   LIVER  Disease Etiology: Alcoholic Cirrhosis  Donor Type:    - Brain Death  CDC High Risk:   No  Donor CMV Status:   Donor CMV Status: Positive  Donor HBcAB:   Negative  Donor HCV Status:   Negative  Donor HBV JAVIER: Negative  Donor HCV JAVIER: Negative  Whole or Partial: Whole Liver  Biliary Anastomosis: End to End  Arterial Anatomy: Standard  Subjective:     History of Present Illness:  Femi Enciso is a 53yr old male with PMH of acute ETOH hepatitis and DEL/ATN evolved to ESRD requiring HD (not candidate for KTX). He is now s/p liver transplant (SM induction, DBD, CMV D+/R-). Transplant surgery noted severe collateralization, adrenal gland firmly adhered to liver. Bare area of adrenal gland required several stitches and packing to obtain fair hemostasis (EBL 15L). OR take back  for bleeding from R adrenal bed in AM (significant clot in retroperitoneum, posterior to R hepatic lobe, tract posterior to the R kidney containing significant clot, small bleeding area of retroperitoneal fat) then returned to surgery same day for hemorrhaging closed with wound vac with plans to return to OR 24-48 hours for closure (retroperitoneal /retrorenal/retrocolic hematoma on the right ). Raw retroperitoneal bleeding. Suture ligatures placed, argon beam cautery, evarest placed in retroperitoneum behind cava and right kidney. Significant oozing from upper right adrenal gland, not amenable to ligation, that portion of the adrenal gland was resected with cautery). OR take back  for washout and incisional closure, no significant bleeding or hematoma found. OR cultures   from body fluid grew enterococcus faecium VRE. ID was consulted,  started on  daptomycin for VRE treatment and cefepime/flagyl to cover for IA ty in bile. Patient with significant leukocytosis with peak on 11/22 at 48K prompting drainage of R subphrenic fluid collection, perc drain placed, culture grew VRE. Stroke code called 11/21 for lethargy and unresponsive, CT head without evidence of acute ischemic changes and CTA chest negative, vascular neurology was consulted recommended MRI brain, but patient's AMS improved shortly after event. Nephrology consulted for ESRD requiring HD. Patient overall improved on antibiotic regimen, leukocytosis and AMS improved. He was transferred to TSU on POD #15.     Hospital Course:  Pt c/o CP 12/21. EKG stable from prior. Troponin wnl. CP resolved 12/22. CXR 12/20 showed no detrimental change. Pt hypertensive prior to HD.  Dialysis tolerated well 12/21 with 1.5L taken off. Bps much improved after dialysis, but monitoring closely.    Interval History:  No acute events overnight. LFTs stable. Leukocytosis persists, but stable at ~13k. Blood cx sent 12/19 with NGTD.  Afebrile. Appetite stimulant started 12/19. Pt still having difficulty with solid foods. He was able to eat ice cream and chicken broth yesterday. Encouraged him to try protein packets on ice cream as he is able to tolerate it. He was seen by SLP 12/20 who passed him without any evidence of aspiration or pharyngeal dysphagia, but recommended GI testing due to pt's complaints of burning sensation and reflux/vomiting after eating. GI consulted and saw pt 12/21. Appreciate recs. Increased Protonix to BID 12/21 and will plan for EGD on Monday 12/24. Coordinated with HD, and moved pt to AM HD 12/24 so GI can go EGD afterwards. Spoke with GI and requested that they leave a prabhakar tube in place if nothing significant found on EGD. Was told it would be noted, but will need to follow up with them tomorrow AM as a different fellow will be working. Meanwhile, continue working on nutrition status, calorie  count, and aggressive PT/OT.       Scheduled Meds:   sodium chloride 0.9%   Intravenous Once    aspirin  81 mg Oral Daily    epoetin sherlyn (PROCRIT) injection  50 Units/kg (Dosing Weight) Intravenous Every Mon, Wed, Fri    ergocalciferol  50,000 Units Oral Q7 Days    escitalopram oxalate  20 mg Oral Nightly    heparin (porcine)  5,000 Units Subcutaneous Q8H    isavuconazonium sulfate  372 mg Oral Daily    lidocaine  1 patch Transdermal Q24H    mirtazapine  7.5 mg Oral QHS    pantoprazole  40 mg Oral BID AC    sulfamethoxazole-trimethoprim 400-80mg  1 tablet Oral Every Mon, Wed, Fri    tacrolimus  2 mg Oral BID    ursodiol  300 mg Oral BID    valganciclovir 50 mg/ml  100 mg Oral Once per day on Mon Wed Fri     Continuous Infusions:  PRN Meds:sodium chloride 0.9%, acetaminophen, albuterol-ipratropium, artificial tears, bacitracin, bisacodyl, dextrose 50%, dextrose 50%, glucagon (human recombinant), glucose, glucose, heparin (porcine), midodrine, ondansetron, ramelteon, tiZANidine, traMADol    Review of Systems   Constitutional: Positive for activity change and appetite change (decreased). Negative for fatigue and fever.   HENT: Negative for congestion, facial swelling, sore throat and voice change.    Eyes: Negative for pain, discharge and visual disturbance.   Respiratory: Negative for apnea, cough, choking, chest tightness, shortness of breath and wheezing.    Cardiovascular: Negative for chest pain, palpitations and leg swelling.   Gastrointestinal: Positive for nausea. Negative for abdominal distention, abdominal pain, constipation, diarrhea and vomiting.   Endocrine: Negative.    Genitourinary: Positive for decreased urine volume. Negative for difficulty urinating, dysuria, hematuria and urgency.   Musculoskeletal: Negative.  Negative for back pain, gait problem, neck pain and neck stiffness.   Skin: Positive for wound. Negative for color change and pallor.   Allergic/Immunologic: Positive for  immunocompromised state.   Neurological: Positive for weakness. Negative for dizziness, seizures, light-headedness and headaches.   Psychiatric/Behavioral: Negative for behavioral problems, confusion, decreased concentration, dysphoric mood, hallucinations, sleep disturbance and suicidal ideas. The patient is not nervous/anxious.      Objective:     Vital Signs (Most Recent):  Temp: 98.2 °F (36.8 °C) (12/23/18 0723)  Pulse: 98 (12/23/18 0920)  Resp: (!) 26 (12/23/18 0920)  BP: (!) 171/98 (12/23/18 0920)  SpO2: 98 % (12/23/18 0920) Vital Signs (24h Range):  Temp:  [98.2 °F (36.8 °C)-98.6 °F (37 °C)] 98.2 °F (36.8 °C)  Pulse:  [84-98] 98  Resp:  [18-30] 26  SpO2:  [98 %-100 %] 98 %  BP: (143-171)/() 171/98     Weight: 66.7 kg (147 lb 0.8 oz)  Body mass index is 21.1 kg/m².    Intake/Output - Last 3 Shifts       12/21 0700 - 12/22 0659 12/22 0700 - 12/23 0659 12/23 0700 - 12/24 0659    P.O. 120 385     I.V. (mL/kg) 50 (0.8)      Other 600      Total Intake(mL/kg) 770 (11.6) 385 (5.8)     Urine (mL/kg/hr) 15 (0) 100 (0.1)     Emesis/NG output       Other 2148      Stool 0 0     Total Output 2163 100     Net -1393 +285            Stool Occurrence 0 x 1 x           Physical Exam   Constitutional: He is oriented to person, place, and time. He appears well-developed. No distress.   Temporal and distal extremity muscle wasting noted.   HENT:   Head: Normocephalic and atraumatic.   Mouth/Throat: Oropharynx is clear and moist. No oropharyngeal exudate.   Eyes: EOM are normal.   Neck: Normal range of motion. Neck supple. No JVD present.   Cardiovascular: Normal rate, regular rhythm, normal heart sounds and intact distal pulses.   No murmur heard.  Pulmonary/Chest: Effort normal. No respiratory distress. He has decreased breath sounds in the right lower field and the left lower field. He has no wheezes. He exhibits no tenderness.   Abdominal: Soft. Bowel sounds are normal. He exhibits no distension. There is no tenderness.  "  [employer] clean, dry, intact with steri strips in place     Musculoskeletal: Normal range of motion. He exhibits no edema or tenderness.   Neurological: He is alert and oriented to person, place, and time. He has normal reflexes.   Skin: Skin is warm and dry. Capillary refill takes 2 to 3 seconds. He is not diaphoretic. No pallor.   Psychiatric: He has a normal mood and affect. His behavior is normal. Thought content normal. His affect is not labile. He is not slowed. Cognition and memory are not impaired.   Nursing note and vitals reviewed.      Laboratory:  Immunosuppressants         Stop Route Frequency     tacrolimus capsule 2 mg      -- Oral 2 times daily        CBC:   Recent Labs   Lab 12/23/18  0648   WBC 13.13*   RBC 3.08*   HGB 9.0*   HCT 28.4*      MCV 92   MCH 29.2   MCHC 31.7*     CMP:   Recent Labs   Lab 12/23/18  0648   GLU 88   CALCIUM 8.7   ALBUMIN 2.1*   PROT 5.8*      K 3.6   CO2 25      BUN 16   CREATININE 2.5*   ALKPHOS 163*   ALT 11   AST 11   BILITOT 1.4*     Labs within the past 24 hours have been reviewed.    Diagnostic Results:  I have personally reviewed all pertinent imaging studies.    Assessment/Plan:     * S/P liver transplant    - LFTs now improving slowly.  T bili was 37.6 day of transplant.  - Drain placed during take back. Drain placed to intra-abdominal abscess on 11/22.   - Fluid from collection noted with enterococcus VRE completed Zyvox treatment.  - Routine month 1 Liver US obtained 12/17 which showed elevated velocity of hepatic artery and low RIs.   - Vascular Surgery consulted. Recommend repeat US in 10-14 days (12/27-12/31) to reassess. Appreciate Vascular assistance.        Chest pain    - Pt c/o "burning" diffuse CP 12/21 AM  - EKG NSR, stable from prior  - Troponin wnl  - Likely GI related pain. GI consulted, EGD planned for 12/24.       Hypophosphatemia    - Continue to monitor.         ATN (acute tubular necrosis)    - Cont Dialysis MWF.  - " "Nephrology following, appreciate recs.  - Monitor.     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       Hypomagnesemia    - Continue to monitor & replace as needed.     Severe malnutrition    - Dietician following. Severe temporal, clavicle muscle depletion noted. Severe subq fat loss  - Nutrition has been poor over the past 24 hours.   - Discussed at length with patient and caregivers that if patient's nutrition status does not improve, will need supplemental nutrition via tube feeds or TPN.  - Appetite stimulant started 12/19.   - Caregivers to bring in outside food for patient.   - Continue calorie count.  - EGD Monday 12/24 as pt c/o poor appetite + burning in chest/esophagus and occasionally vomiting after eating x several weeks.   - Asked GI to place prabhakar tube if nothing significant found during EGD - need to follow up with fellow in AM 12/24.     Anxiety    - Restarted Lexapro as per psych recs, 12/13.   - Monitor.     Prolonged Q-T interval on ECG    -  ms on EKG 12/19.  -  on EKG 12/21.  - Monitor.       Alcohol use disorder, severe, in early remission, dependence    - Monitor.       Decreased appetite    - Continue appetite stimulant.  - GI consulted as pt c/o burning sensation in chest/esophagus + vomiting after eating, passed SLP eval, recommended GI f/u.  - Cont to encourage PO intake. Ordered additional supplements.   - EGD planned for Monday, 12/24. Asked GI to place prabhakar tube if nothing significant found on EGD.       Acute renal failure with acute tubular necrosis superimposed on stage 3 chronic kidney disease    - 2 weeks for DEL prior to transplant  - Renal function slow to recover post-op.   - Nephrology consulted for management.   - Cont with HD as per nephrology recs.  Apprec recs.    - Lasix 160 mg challenge 11/28 w/ minimal output.    - HD MWF.   - Strict I&Os.      Intra-abdominal abscess    - Significant increase in WBC post-op. "   - OR cultures from 11/18 noted with enterococcus VRE.   - Abdominal CT performed at that time with fluid collection and drain placed on 11/22 for drainage.   - Intra-abdominal abscess culture also with enterococcus VRE.   - ID consulted and pt placed on antibxs for treatment. Pt was on Cefepime, Daptomycin, and Fluconazole for treatment.    - Pt remains with RLQ drains (one JOSE A and one Percutaneous).  One JOSE A removed 11/28.  Perc drain in placed draining tan, clear drainage.  WBC slowly improving.   - Liver US on 11/26 reviewed.   - Abdominal CT performed 11/27 and reviewed. Fluid collection noted with improvement on CT.  ID following and reassured by findings.    - Dapto, Cefepime and Flagyl changed 11/30/18 to Linezolid 600 mg PO q 12 hr x 10 days per ID.     - Added back cefepime after thora.  ID re consulted.  - Completed Zyvox x 10 days per ID thru 12/9/18. Monitor.        Long-term use of immunosuppressant medication    - Cont with prograf. Draw prograf level daily and adjust dose as needed to maintain a therapeutic level.        At risk for opportunistic infections    - Cont with bactrim and valcyte for protection against opportunistic infections.   - Cont Isavuconazonium for fungal prophylaxis.     Leukocytosis    - See intra-abdominal abscess.  - wbc improving, cont w delirium.    - Repeat cx 12/4 with NGTD.  Completed Linezolid and Cefepime per ID.    - Wbc trended up 12/11- repeat cx 12/11.  - WBC w/ improvement 12/12  - WBC elevated, but stable ~13k since 12/20. Blood cx 12/19 NGTD. Afebrile. CXR no detrimental change. Thought could be related to dehydration. Will encourage PO intake and continue to monitor. EGD Mon 12/24.     Adrenal cortical steroids causing adverse effect in therapeutic use    - Monitor.       Prophylactic immunotherapy    - See long term immunosuppression.        Debility    - Pt remains significantly debilitated.   - PT/OT for strengthening.   - Currently will need SNF for placement  and further rehabilitation.   - Pt remains severely debilitated and not strong enough for SNF at this time.    - Cont with strengthening  - SNF consult placed 12/17. However, denied for SNF again as not strong enough at this time  - Rehab consulted, following.        Pleural effusion associated with hepatic disorder    - c/o increased pain w deep breath today to right side.  CXR showed increased opacity in the inferior hemothorax on the right side since 11/26/2018.  Pulse ox 97-99% on RA.  Cont to monitor.   - continues to have fluid to RLL.  WBC remains elevated.    - thoracentesis performed on 11/30. Fluid noted with 4k WBC, 93 SEGS. cx no growth to date.  Cefepime added for treatment.  - Chest CT showing right pleural effusion improved, left w increased pleural effusion.   - Per ID, completed treatment of empyema w Cefepime thru 12/8.  - sats remain 97-99% on RA.  - CXR 12/20 showed left hemidiaphragmatic margin that is better delineated than on 12/19/2018, consistent with improved aeration in the left lower lobe. + no significant detrimental interval change in the appearance of the chest, with the current examination demonstrating a slightly improved inspiratory depth level.       Type 2 diabetes mellitus without complication    - Endocrine consulted for management. Appreciate recs.   - Hypoglycemia 12/10 requiring D50.    - Repeat cx 12/11 - no growth       Anemia of chronic disease    - H&H stable. Monitor with daily labs.   - Chest/Abd/pelvis CT 12/4- no fluid to be drainged per transplant surgeon.  No source of bleeding.     - Last liver US 11/27. Evolving peritransplant hematomas with anechoic fluid collection adjacent to the right hepatic lobe.  Small volume perihepatic free fluid.         VTE Risk Mitigation (From admission, onward)        Ordered     heparin (porcine) injection 1,000 Units  As needed (PRN)      12/12/18 7256     heparin (porcine) injection 5,000 Units  Every 8 hours      11/30/18 1031      IP VTE HIGH RISK PATIENT  Once      11/11/18 1205          The patients clinical status was discussed at multidisplinary rounds, involving transplant surgery, transplant medicine, pharmacy, nursing, nutrition, and social work    Discharge Planning:  No Patient Care Coordination Note on file.      Therese Vu PA-C  Liver Transplant  [facility]

## 2018-12-23 NOTE — ASSESSMENT & PLAN NOTE
- Continue appetite stimulant.  - GI consulted as pt c/o burning sensation in chest/esophagus + vomiting after eating, passed SLP eval, recommended GI f/u.  - Cont to encourage PO intake. Ordered additional supplements.   - EGD planned for Monday, 12/24. Asked GI to place prabhakar tube if nothing significant found on EGD.

## 2018-12-23 NOTE — PLAN OF CARE
Problem: Patient Care Overview  Goal: Plan of Care Review  Outcome: Ongoing (interventions implemented as appropriate)  Pt AAOx4, VSS, afebrile.  C/o back pain with relief to lidocaine patch and prn pain medication.  Self meds pulled 100%.  Box updated and refilled.  Fall risk precautions initiated.  Pt in lowest bed position setting, lighting adjusted, pt to wear nonskid socks when ambulating, side rails up x2.  Pt remain free from falls during shift.  Pt verbalize understanding to call when needed assistance. Call light within reach.  Will continue to monitor.

## 2018-12-23 NOTE — SUBJECTIVE & OBJECTIVE
Scheduled Meds:   sodium chloride 0.9%   Intravenous Once    aspirin  81 mg Oral Daily    epoetin sherlyn (PROCRIT) injection  50 Units/kg (Dosing Weight) Intravenous Every Mon, Wed, Fri    ergocalciferol  50,000 Units Oral Q7 Days    escitalopram oxalate  20 mg Oral Nightly    heparin (porcine)  5,000 Units Subcutaneous Q8H    isavuconazonium sulfate  372 mg Oral Daily    lidocaine  1 patch Transdermal Q24H    mirtazapine  7.5 mg Oral QHS    pantoprazole  40 mg Oral BID AC    sulfamethoxazole-trimethoprim 400-80mg  1 tablet Oral Every Mon, Wed, Fri    tacrolimus  2 mg Oral BID    ursodiol  300 mg Oral BID    valganciclovir 50 mg/ml  100 mg Oral Once per day on Mon Wed Fri     Continuous Infusions:  PRN Meds:sodium chloride 0.9%, acetaminophen, albuterol-ipratropium, artificial tears, bacitracin, bisacodyl, dextrose 50%, dextrose 50%, glucagon (human recombinant), glucose, glucose, heparin (porcine), midodrine, ondansetron, ramelteon, tiZANidine, traMADol    Review of Systems   Constitutional: Positive for activity change and appetite change (decreased). Negative for fatigue and fever.   HENT: Negative for congestion, facial swelling, sore throat and voice change.    Eyes: Negative for pain, discharge and visual disturbance.   Respiratory: Negative for apnea, cough, choking, chest tightness, shortness of breath and wheezing.    Cardiovascular: Negative for chest pain, palpitations and leg swelling.   Gastrointestinal: Positive for nausea. Negative for abdominal distention, abdominal pain, constipation, diarrhea and vomiting.   Endocrine: Negative.    Genitourinary: Positive for decreased urine volume. Negative for difficulty urinating, dysuria, hematuria and urgency.   Musculoskeletal: Negative.  Negative for back pain, gait problem, neck pain and neck stiffness.   Skin: Positive for wound. Negative for color change and pallor.   Allergic/Immunologic: Positive for immunocompromised state.   Neurological:  Positive for weakness. Negative for dizziness, seizures, light-headedness and headaches.   Psychiatric/Behavioral: Negative for behavioral problems, confusion, decreased concentration, dysphoric mood, hallucinations, sleep disturbance and suicidal ideas. The patient is not nervous/anxious.      Objective:     Vital Signs (Most Recent):  Temp: 98.2 °F (36.8 °C) (12/23/18 0723)  Pulse: 98 (12/23/18 0920)  Resp: (!) 26 (12/23/18 0920)  BP: (!) 171/98 (12/23/18 0920)  SpO2: 98 % (12/23/18 0920) Vital Signs (24h Range):  Temp:  [98.2 °F (36.8 °C)-98.6 °F (37 °C)] 98.2 °F (36.8 °C)  Pulse:  [84-98] 98  Resp:  [18-30] 26  SpO2:  [98 %-100 %] 98 %  BP: (143-171)/() 171/98     Weight: 66.7 kg (147 lb 0.8 oz)  Body mass index is 21.1 kg/m².    Intake/Output - Last 3 Shifts       12/21 0700 - 12/22 0659 12/22 0700 - 12/23 0659 12/23 0700 - 12/24 0659    P.O. 120 385     I.V. (mL/kg) 50 (0.8)      Other 600      Total Intake(mL/kg) 770 (11.6) 385 (5.8)     Urine (mL/kg/hr) 15 (0) 100 (0.1)     Emesis/NG output       Other 2148      Stool 0 0     Total Output 2163 100     Net -1393 +285            Stool Occurrence 0 x 1 x           Physical Exam   Constitutional: He is oriented to person, place, and time. He appears well-developed. No distress.   Temporal and distal extremity muscle wasting noted.   HENT:   Head: Normocephalic and atraumatic.   Mouth/Throat: Oropharynx is clear and moist. No oropharyngeal exudate.   Eyes: EOM are normal.   Neck: Normal range of motion. Neck supple. No JVD present.   Cardiovascular: Normal rate, regular rhythm, normal heart sounds and intact distal pulses.   No murmur heard.  Pulmonary/Chest: Effort normal. No respiratory distress. He has decreased breath sounds in the right lower field and the left lower field. He has no wheezes. He exhibits no tenderness.   Abdominal: Soft. Bowel sounds are normal. He exhibits no distension. There is no tenderness.   [employer] clean, dry, intact with  steri strips in place     Musculoskeletal: Normal range of motion. He exhibits no edema or tenderness.   Neurological: He is alert and oriented to person, place, and time. He has normal reflexes.   Skin: Skin is warm and dry. Capillary refill takes 2 to 3 seconds. He is not diaphoretic. No pallor.   Psychiatric: He has a normal mood and affect. His behavior is normal. Thought content normal. His affect is not labile. He is not slowed. Cognition and memory are not impaired.   Nursing note and vitals reviewed.      Laboratory:  Immunosuppressants         Stop Route Frequency     tacrolimus capsule 2 mg      -- Oral 2 times daily        CBC:   Recent Labs   Lab 12/23/18  0648   WBC 13.13*   RBC 3.08*   HGB 9.0*   HCT 28.4*      MCV 92   MCH 29.2   MCHC 31.7*     CMP:   Recent Labs   Lab 12/23/18  0648   GLU 88   CALCIUM 8.7   ALBUMIN 2.1*   PROT 5.8*      K 3.6   CO2 25      BUN 16   CREATININE 2.5*   ALKPHOS 163*   ALT 11   AST 11   BILITOT 1.4*     Labs within the past 24 hours have been reviewed.    Diagnostic Results:  I have personally reviewed all pertinent imaging studies.

## 2018-12-24 ENCOUNTER — ANESTHESIA (OUTPATIENT)
Dept: ENDOSCOPY | Facility: HOSPITAL | Age: 53
DRG: 005 | End: 2018-12-24
Payer: COMMERCIAL

## 2018-12-24 PROBLEM — R17 JAUNDICE: Status: ACTIVE | Noted: 2018-12-24

## 2018-12-24 LAB
ALBUMIN SERPL BCP-MCNC: 2.1 G/DL
ALP SERPL-CCNC: 161 U/L
ALT SERPL W/O P-5'-P-CCNC: 9 U/L
ANION GAP SERPL CALC-SCNC: 11 MMOL/L
AST SERPL-CCNC: 11 U/L
BACTERIA BLD CULT: NORMAL
BACTERIA BLD CULT: NORMAL
BASOPHILS # BLD AUTO: 0.24 K/UL
BASOPHILS NFR BLD: 1.6 %
BILIRUB SERPL-MCNC: 1.3 MG/DL
BUN SERPL-MCNC: 23 MG/DL
CALCIUM SERPL-MCNC: 8.9 MG/DL
CHLORIDE SERPL-SCNC: 104 MMOL/L
CO2 SERPL-SCNC: 22 MMOL/L
CREAT SERPL-MCNC: 2.5 MG/DL
DIFFERENTIAL METHOD: ABNORMAL
EOSINOPHIL # BLD AUTO: 0.5 K/UL
EOSINOPHIL NFR BLD: 3.3 %
ERYTHROCYTE [DISTWIDTH] IN BLOOD BY AUTOMATED COUNT: 15.8 %
EST. GFR  (AFRICAN AMERICAN): 32.7 ML/MIN/1.73 M^2
EST. GFR  (NON AFRICAN AMERICAN): 28.3 ML/MIN/1.73 M^2
GLUCOSE SERPL-MCNC: 88 MG/DL
HCT VFR BLD AUTO: 27.7 %
HGB BLD-MCNC: 8.8 G/DL
IMM GRANULOCYTES # BLD AUTO: 0.22 K/UL
IMM GRANULOCYTES NFR BLD AUTO: 1.4 %
LYMPHOCYTES # BLD AUTO: 0.8 K/UL
LYMPHOCYTES NFR BLD: 5.3 %
MAGNESIUM SERPL-MCNC: 1.6 MG/DL
MCH RBC QN AUTO: 28.3 PG
MCHC RBC AUTO-ENTMCNC: 31.8 G/DL
MCV RBC AUTO: 89 FL
MONOCYTES # BLD AUTO: 1.9 K/UL
MONOCYTES NFR BLD: 12.8 %
NEUTROPHILS # BLD AUTO: 11.5 K/UL
NEUTROPHILS NFR BLD: 75.6 %
NRBC BLD-RTO: 0 /100 WBC
PHOSPHATE SERPL-MCNC: 3.4 MG/DL
PLATELET # BLD AUTO: 298 K/UL
PMV BLD AUTO: 10.9 FL
POCT GLUCOSE: 76 MG/DL (ref 70–110)
POCT GLUCOSE: 84 MG/DL (ref 70–110)
POCT GLUCOSE: 87 MG/DL (ref 70–110)
POCT GLUCOSE: 88 MG/DL (ref 70–110)
POTASSIUM SERPL-SCNC: 3.3 MMOL/L
PROT SERPL-MCNC: 5.9 G/DL
RBC # BLD AUTO: 3.11 M/UL
SODIUM SERPL-SCNC: 137 MMOL/L
TACROLIMUS BLD-MCNC: 4.6 NG/ML
WBC # BLD AUTO: 15.19 K/UL

## 2018-12-24 PROCEDURE — 88342 IMHCHEM/IMCYTCHM 1ST ANTB: CPT | Performed by: PATHOLOGY

## 2018-12-24 PROCEDURE — 88342 IMHCHEM/IMCYTCHM 1ST ANTB: CPT | Mod: 26,,, | Performed by: PATHOLOGY

## 2018-12-24 PROCEDURE — 97112 NEUROMUSCULAR REEDUCATION: CPT

## 2018-12-24 PROCEDURE — 97530 THERAPEUTIC ACTIVITIES: CPT

## 2018-12-24 PROCEDURE — 83735 ASSAY OF MAGNESIUM: CPT

## 2018-12-24 PROCEDURE — 90935 HEMODIALYSIS ONE EVALUATION: CPT

## 2018-12-24 PROCEDURE — 37000008 HC ANESTHESIA 1ST 15 MINUTES: Performed by: INTERNAL MEDICINE

## 2018-12-24 PROCEDURE — 25000003 PHARM REV CODE 250: Performed by: NURSE ANESTHETIST, CERTIFIED REGISTERED

## 2018-12-24 PROCEDURE — 25000003 PHARM REV CODE 250: Performed by: INTERNAL MEDICINE

## 2018-12-24 PROCEDURE — 43239 EGD BIOPSY SINGLE/MULTIPLE: CPT | Performed by: INTERNAL MEDICINE

## 2018-12-24 PROCEDURE — 27201012 HC FORCEPS, HOT/COLD, DISP: Performed by: INTERNAL MEDICINE

## 2018-12-24 PROCEDURE — D9220A PRA ANESTHESIA: Mod: ANES,,, | Performed by: ANESTHESIOLOGY

## 2018-12-24 PROCEDURE — 99233 SBSQ HOSP IP/OBS HIGH 50: CPT | Mod: 24,,, | Performed by: NURSE PRACTITIONER

## 2018-12-24 PROCEDURE — 20600001 HC STEP DOWN PRIVATE ROOM

## 2018-12-24 PROCEDURE — D9220A PRA ANESTHESIA: Mod: CRNA,,, | Performed by: NURSE ANESTHETIST, CERTIFIED REGISTERED

## 2018-12-24 PROCEDURE — 63600175 PHARM REV CODE 636 W HCPCS: Performed by: NURSE PRACTITIONER

## 2018-12-24 PROCEDURE — 80197 ASSAY OF TACROLIMUS: CPT

## 2018-12-24 PROCEDURE — 88342 TISSUE SPECIMEN TO PATHOLOGY - SURGERY: ICD-10-PCS | Mod: 26,,, | Performed by: PATHOLOGY

## 2018-12-24 PROCEDURE — 99233 PR SUBSEQUENT HOSPITAL CARE,LEVL III: ICD-10-PCS | Mod: 24,,, | Performed by: NURSE PRACTITIONER

## 2018-12-24 PROCEDURE — 25000003 PHARM REV CODE 250: Performed by: PHYSICIAN ASSISTANT

## 2018-12-24 PROCEDURE — 94640 AIRWAY INHALATION TREATMENT: CPT

## 2018-12-24 PROCEDURE — 25000242 PHARM REV CODE 250 ALT 637 W/ HCPCS: Performed by: STUDENT IN AN ORGANIZED HEALTH CARE EDUCATION/TRAINING PROGRAM

## 2018-12-24 PROCEDURE — 87040 BLOOD CULTURE FOR BACTERIA: CPT

## 2018-12-24 PROCEDURE — 63600175 PHARM REV CODE 636 W HCPCS: Performed by: SURGERY

## 2018-12-24 PROCEDURE — 84100 ASSAY OF PHOSPHORUS: CPT

## 2018-12-24 PROCEDURE — 43239 EGD BIOPSY SINGLE/MULTIPLE: CPT | Mod: ,,, | Performed by: INTERNAL MEDICINE

## 2018-12-24 PROCEDURE — 80053 COMPREHEN METABOLIC PANEL: CPT

## 2018-12-24 PROCEDURE — 99233 SBSQ HOSP IP/OBS HIGH 50: CPT | Mod: ,,, | Performed by: NURSE PRACTITIONER

## 2018-12-24 PROCEDURE — 25000003 PHARM REV CODE 250: Performed by: NURSE PRACTITIONER

## 2018-12-24 PROCEDURE — 25000003 PHARM REV CODE 250: Performed by: STUDENT IN AN ORGANIZED HEALTH CARE EDUCATION/TRAINING PROGRAM

## 2018-12-24 PROCEDURE — 43239 PR EGD, FLEX, W/BIOPSY, SGL/MULTI: ICD-10-PCS | Mod: ,,, | Performed by: INTERNAL MEDICINE

## 2018-12-24 PROCEDURE — 63600175 PHARM REV CODE 636 W HCPCS: Mod: JG | Performed by: INTERNAL MEDICINE

## 2018-12-24 PROCEDURE — 88305 TISSUE SPECIMEN TO PATHOLOGY - SURGERY: ICD-10-PCS | Mod: 26,,, | Performed by: PATHOLOGY

## 2018-12-24 PROCEDURE — 25000003 PHARM REV CODE 250: Performed by: SURGERY

## 2018-12-24 PROCEDURE — 94664 DEMO&/EVAL PT USE INHALER: CPT

## 2018-12-24 PROCEDURE — 37000009 HC ANESTHESIA EA ADD 15 MINS: Performed by: INTERNAL MEDICINE

## 2018-12-24 PROCEDURE — 99233 PR SUBSEQUENT HOSPITAL CARE,LEVL III: ICD-10-PCS | Mod: ,,, | Performed by: NURSE PRACTITIONER

## 2018-12-24 PROCEDURE — 63600175 PHARM REV CODE 636 W HCPCS: Performed by: TRANSPLANT SURGERY

## 2018-12-24 PROCEDURE — 85025 COMPLETE CBC W/AUTO DIFF WBC: CPT

## 2018-12-24 PROCEDURE — 63600175 PHARM REV CODE 636 W HCPCS: Performed by: NURSE ANESTHETIST, CERTIFIED REGISTERED

## 2018-12-24 PROCEDURE — 36415 COLL VENOUS BLD VENIPUNCTURE: CPT

## 2018-12-24 PROCEDURE — 88305 TISSUE EXAM BY PATHOLOGIST: CPT | Mod: 26,,, | Performed by: PATHOLOGY

## 2018-12-24 PROCEDURE — 88305 TISSUE EXAM BY PATHOLOGIST: CPT | Performed by: PATHOLOGY

## 2018-12-24 RX ORDER — LIDOCAINE HCL/PF 100 MG/5ML
SYRINGE (ML) INTRAVENOUS
Status: DISCONTINUED | OUTPATIENT
Start: 2018-12-24 | End: 2018-12-24

## 2018-12-24 RX ORDER — GLYCOPYRROLATE 0.2 MG/ML
INJECTION INTRAMUSCULAR; INTRAVENOUS
Status: DISCONTINUED | OUTPATIENT
Start: 2018-12-24 | End: 2018-12-24

## 2018-12-24 RX ORDER — TACROLIMUS 1 MG/1
3 CAPSULE ORAL 2 TIMES DAILY
Status: DISCONTINUED | OUTPATIENT
Start: 2018-12-24 | End: 2018-12-24

## 2018-12-24 RX ORDER — PROPOFOL 10 MG/ML
VIAL (ML) INTRAVENOUS CONTINUOUS PRN
Status: DISCONTINUED | OUTPATIENT
Start: 2018-12-24 | End: 2018-12-24

## 2018-12-24 RX ORDER — HEPARIN SODIUM 5000 [USP'U]/ML
5000 INJECTION, SOLUTION INTRAVENOUS; SUBCUTANEOUS EVERY 8 HOURS
Status: DISCONTINUED | OUTPATIENT
Start: 2018-12-24 | End: 2019-01-08 | Stop reason: HOSPADM

## 2018-12-24 RX ORDER — PROPOFOL 10 MG/ML
VIAL (ML) INTRAVENOUS
Status: DISCONTINUED | OUTPATIENT
Start: 2018-12-24 | End: 2018-12-24

## 2018-12-24 RX ORDER — TRAMADOL HYDROCHLORIDE 50 MG/1
50 TABLET ORAL EVERY 6 HOURS PRN
Status: DISCONTINUED | OUTPATIENT
Start: 2018-12-24 | End: 2019-01-08 | Stop reason: HOSPADM

## 2018-12-24 RX ORDER — SODIUM CHLORIDE 0.9 % (FLUSH) 0.9 %
3 SYRINGE (ML) INJECTION
Status: DISCONTINUED | OUTPATIENT
Start: 2018-12-24 | End: 2018-12-24 | Stop reason: HOSPADM

## 2018-12-24 RX ORDER — METOPROLOL TARTRATE 25 MG/1
12.5 TABLET ORAL 2 TIMES DAILY
Status: DISCONTINUED | OUTPATIENT
Start: 2018-12-24 | End: 2018-12-25

## 2018-12-24 RX ADMIN — ONDANSETRON 4 MG: 2 INJECTION INTRAMUSCULAR; INTRAVENOUS at 04:12

## 2018-12-24 RX ADMIN — PANTOPRAZOLE SODIUM 40 MG: 40 TABLET, DELAYED RELEASE ORAL at 05:12

## 2018-12-24 RX ADMIN — TACROLIMUS 1.5 MG: 1 CAPSULE ORAL at 06:12

## 2018-12-24 RX ADMIN — ERYTHROPOIETIN 4200 UNITS: 10000 INJECTION, SOLUTION INTRAVENOUS; SUBCUTANEOUS at 12:12

## 2018-12-24 RX ADMIN — HEPARIN SODIUM 5000 UNITS: 5000 INJECTION, SOLUTION INTRAVENOUS; SUBCUTANEOUS at 09:12

## 2018-12-24 RX ADMIN — SULFAMETHOXAZOLE AND TRIMETHOPRIM 1 TABLET: 400; 80 TABLET ORAL at 09:12

## 2018-12-24 RX ADMIN — PROPOFOL 150 MCG/KG/MIN: 10 INJECTION, EMULSION INTRAVENOUS at 01:12

## 2018-12-24 RX ADMIN — ESCITALOPRAM OXALATE 20 MG: 10 TABLET ORAL at 09:12

## 2018-12-24 RX ADMIN — MORPHINE 100 MG: 10 SOLUTION ORAL at 09:12

## 2018-12-24 RX ADMIN — METOPROLOL TARTRATE 12.5 MG: 25 TABLET, FILM COATED ORAL at 09:12

## 2018-12-24 RX ADMIN — SODIUM CHLORIDE 350 ML: 0.9 INJECTION, SOLUTION INTRAVENOUS at 10:12

## 2018-12-24 RX ADMIN — SODIUM CHLORIDE: 0.9 INJECTION, SOLUTION INTRAVENOUS at 01:12

## 2018-12-24 RX ADMIN — GLYCOPYRROLATE 0.1 MG: 0.2 INJECTION, SOLUTION INTRAMUSCULAR; INTRAVENOUS at 01:12

## 2018-12-24 RX ADMIN — MIRTAZAPINE 7.5 MG: 7.5 TABLET ORAL at 09:12

## 2018-12-24 RX ADMIN — LIDOCAINE HYDROCHLORIDE 100 MG: 20 INJECTION, SOLUTION INTRAVENOUS at 01:12

## 2018-12-24 RX ADMIN — TACROLIMUS 2 MG: 1 CAPSULE ORAL at 08:12

## 2018-12-24 RX ADMIN — PROPOFOL 50 MG: 10 INJECTION, EMULSION INTRAVENOUS at 01:12

## 2018-12-24 RX ADMIN — TRAMADOL HYDROCHLORIDE 50 MG: 50 TABLET, FILM COATED ORAL at 01:12

## 2018-12-24 RX ADMIN — TRAMADOL HYDROCHLORIDE 50 MG: 50 TABLET, FILM COATED ORAL at 08:12

## 2018-12-24 RX ADMIN — IPRATROPIUM BROMIDE AND ALBUTEROL SULFATE 3 ML: .5; 3 SOLUTION RESPIRATORY (INHALATION) at 03:12

## 2018-12-24 RX ADMIN — LIDOCAINE 1 PATCH: 50 PATCH CUTANEOUS at 05:12

## 2018-12-24 RX ADMIN — URSODIOL 300 MG: 300 CAPSULE ORAL at 09:12

## 2018-12-24 RX ADMIN — HEPARIN SODIUM 1000 UNITS: 1000 INJECTION, SOLUTION INTRAVENOUS; SUBCUTANEOUS at 01:12

## 2018-12-24 RX ADMIN — HEPARIN SODIUM 5000 UNITS: 5000 INJECTION, SOLUTION INTRAVENOUS; SUBCUTANEOUS at 05:12

## 2018-12-24 NOTE — PROGRESS NOTES
[facility]  Nephrology  Progress Note    Patient Name: Femi Enciso  MRN: [id number]  Admission Date: 10/27/2018  Hospital Length of Stay: 58 days  Attending Provider: Femi Patel MD   Primary Care Physician: Primary Doctor No  Principal Problem:S/P liver transplant    Subjective:     HPI: 52 y/o man with DM2 presents to the ED with family for liver failure (likely due to EtOH abundant history of drinking - diagnosed in Sept 2018 in [place]).  He reports jaundice, generalized weakness, nausea, diarrhea, and decreased appetite since Sept 2018.  He is from [place], [place], and Dr. Sharma (patients physician) recommended bringing him to hospital for evaluation.  Patient denies any fever, chills, vomiting, chest pain, palpitations, SOB, abdominal pain.      Nephrology consulted for evaluation/management Adri.     Interval History: Patient tolerated iHD today w/o complications, UF 1 L. Patient s/p EGD today with GI.       Review of patient's allergies indicates:   Allergen Reactions    Penicillins Nausea And Vomiting and Rash     Full body rash from cefepime as well     Current Facility-Administered Medications   Medication Frequency    0.9%  NaCl infusion PRN    acetaminophen tablet 650 mg Q6H PRN    albuterol-ipratropium 2.5 mg-0.5 mg/3 mL nebulizer solution 3 mL Q4H PRN    artificial tears 0.5 % ophthalmic solution 1 drop PRN    aspirin chewable tablet 81 mg Daily    bacitracin ointment PRN    bisacodyl suppository 10 mg Daily PRN    dextrose 50% injection 12.5 g PRN    dextrose 50% injection 25 g PRN    epoetin sherlyn injection 4,200 Units Every Mon, Wed, Fri    ergocalciferol capsule 50,000 Units Q7 Days    escitalopram oxalate tablet 20 mg Nightly    glucagon (human recombinant) injection 1 mg PRN    glucose chewable tablet 16 g PRN    glucose chewable tablet 24 g PRN    heparin (porcine) injection 1,000 Units PRN    heparin (porcine) injection 5,000 Units Q8H    hydrALAZINE injection  10 mg Q6H PRN    isavuconazonium sulfate Cap 372 mg Daily    lidocaine 5 % patch 1 patch Q24H    metoprolol tartrate (LOPRESSOR) split tablet 12.5 mg BID    midodrine tablet 10 mg TID PRN    mirtazapine tablet 7.5 mg QHS    ondansetron injection 4 mg Q6H PRN    pantoprazole EC tablet 40 mg BID AC    ramelteon tablet 8 mg Nightly PRN    sulfamethoxazole-trimethoprim 400-80mg per tablet 1 tablet Every Mon, Wed, Fri    tacrolimus capsule 3 mg BID    tiZANidine tablet 2 mg Q8H PRN    traMADol tablet 50 mg Q6H PRN    traMADol tablet 50 mg Q6H PRN    ursodiol capsule 300 mg BID    valganciclovir 50 mg/ml oral solution 100 mg Once per day on Mon Wed Fri       Objective:     Vital Signs (Most Recent):  Temp: 97.9 °F (36.6 °C) (12/24/18 1516)  Pulse: 94 (12/24/18 1531)  Resp: (!) 22 (12/24/18 1531)  BP: 136/88 (12/24/18 1516)  SpO2: 99 % (12/24/18 1531)  O2 Device (Oxygen Therapy): room air (12/24/18 1531) Vital Signs (24h Range):  Temp:  [97.8 °F (36.6 °C)-98.8 °F (37.1 °C)] 97.9 °F (36.6 °C)  Pulse:  [] 94  Resp:  [16-35] 22  SpO2:  [96 %-100 %] 99 %  BP: (106-183)/() 136/88     Weight: 66.3 kg (146 lb 2.6 oz) (12/24/18 0610)  Body mass index is 20.97 kg/m².  Body surface area is 1.81 meters squared.    I/O last 3 completed shifts:  In: 600 [P.O.:600]  Out: 200 [Urine:200]    Physical Exam   Constitutional: He is oriented to person, place, and time. He appears well-developed. No distress.   Temporal and distal extremity muscle wasting noted.   HENT:   Head: Normocephalic and atraumatic.   Mouth/Throat: Oropharynx is clear and moist. No oropharyngeal exudate.   Neck: Normal range of motion. Neck supple. No JVD present.   Cardiovascular: Normal rate, regular rhythm, normal heart sounds and intact distal pulses.   No murmur heard.  Pulmonary/Chest: Effort normal. No respiratory distress. He has no decreased breath sounds. He has no wheezes. He exhibits no tenderness.   Abdominal: Soft. Bowel sounds  are normal. He exhibits no distension. There is no tenderness.   Musculoskeletal: Normal range of motion. He exhibits no edema or tenderness.   Neurological: He is alert and oriented to person, place, and time. He has normal reflexes.   Skin: Skin is warm and dry. He is not diaphoretic. No pallor.   Psychiatric: He has a normal mood and affect. His behavior is normal. Thought content normal. His affect is not labile. He is not slowed. Cognition and memory are not impaired.   Nursing note and vitals reviewed.      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     Plan:  - Patient oliguric (making some urine, 200 mL/24 hr).   - HD x 3.5 hrs today for metabolic clearance and volume management, UF 1 L.   - Phos 3.4, not on binders   - Hgb 8.8, remains on Epo 4200 units MWF   - Strict I/O and chart  - Avoid nephrotoxic medications  - Maintain MAP >65 please continue midodrine  - please keep Hb > 7 gm/dL  - Medication doses adjusted to GFR  - Renal diet  - EGD today with GI 2/2 poor intake         Case discussed with staff further recs with attestation.     I will follow-up with patient. Please contact us if you have any additional questions.    TAMELA Rousseau DNP, APRN, FNP-C  Department of Nephrology  [facility]  Pager: [phone]

## 2018-12-24 NOTE — ASSESSMENT & PLAN NOTE
- Pt remains significantly debilitated.   - PT/OT for strengthening.   - Currently will need SNF for placement and further rehabilitation.   - Pt remains severely debilitated and not strong enough for SNF at this time.    - Cont with strengthening  - SNF consult placed 12/17. However, denied for SNF again as not strong enough at this time  - Rehab consulted, per MSW- ins. will not cover rehab.  Pt is getting stronger.

## 2018-12-24 NOTE — ASSESSMENT & PLAN NOTE
- See intra-abdominal abscess.  - wbc improving, cont w delirium.    - Repeat cx 12/4 with NGTD.  Completed Linezolid and Cefepime per ID.    - Wbc trended up 12/11- repeat cx 12/11.  - WBC w/ improvement 12/12  - WBC elevated over the wkd ~13k since 12/20. Blood cx 12/19 NGTD. Afebrile. CXR no detrimental change. WBC 12/24 up to 15.19.  Repeating cx.  Encourage PO intake. Continue to monitor. EGD Mon 12/24.

## 2018-12-24 NOTE — PT/OT/SLP PROGRESS
"Physical Therapy Treatment    Patient Name:  Femi Enciso   MRN:  [id number]    Recommendations:     Discharge Recommendations:  nursing facility, skilled   Discharge Equipment Recommendations: (TBD at next level of care)   Barriers to discharge: Decreased caregiver support at current functional level    Assessment:     Femi Enciso is a 53 y.o. male admitted with a medical diagnosis of S/P liver transplant.  He presents with the following impairments/functional limitations:  weakness, gait instability, impaired endurance, impaired balance, decreased safety awareness, impaired self care skills, impaired functional mobilty. Improvement noted in static standing balance and postural control this date. However, pt continues to require assist of 2 to safely complete stand pivot transfers with knee buckling and decreased safety awareness noted throughout. Therapy session limited this date 2* arrival of transport. Pt would continue to benefit from skilled acute PT in order to address current deficits and progress functional mobility.     Rehab Prognosis: Good; patient would benefit from acute skilled PT services to address these deficits and reach maximum level of function.    Recent Surgery: Procedure(s) (LRB):  LAPAROTOMY, EXPLORATORY, AFTER LIVER TRANSPLANT, LIKELY  ABDOMINAL CLOSURE (N/A) 36 Days Post-Op    Plan:     During this hospitalization, patient to be seen 4 x/week to address the identified rehab impairments via gait training, therapeutic activities, therapeutic exercises, neuromuscular re-education and progress toward the following goals:    · Plan of Care Expires:  01/17/19    Subjective     Chief Complaint: none noted   Patient/Family Comments/goals: "Going down!"   Pain/Comfort:  · Pain Rating 1: 0/10      Objective:     Communicated with RN prior to session.  Patient found supine with telemetry  upon PT entry to room.     General Precautions: Standard, fall   Orthopedic Precautions:N/A   Braces: N/A "     Functional Mobility:  · Bed Mobility:     · Supine to Sit: contact guard assistance  · Sit to Supine: contact guard assistance  · Transfers:    · Sit to Stand:  x1 rep from EOB with minAx2; x2 reps from bedside chair with modAx2   · With B knees blocked throughout to facilitate increased knee ext  · Bed to Chair: initially modAx2 but then required maxAx2 to complete transfer with  no AD  using  Stand Pivot (x2 reps: bed>chair; chair>stretcher)  § B knees blocked throughout to prevent buckling/maintain knee ext  § Cues throughout for sequencing of steps and forward progression  § Decreased toe-floor clearance BLE, decreased step length  § Knee buckling noted during transfer; max cues and assist provided to correct; pt attempting to sit prior to stable sitting surface in place despite max cues for safety and balance corrections, requiring increased assist to complete transfer   · Gait: limited to steps during stand pivot transfers with modAx2-maxAx2   · Balance:   · static standing: minAx2    · dynamic standing: modAx2-maxAx2      AM-PAC 6 CLICK MOBILITY  Turning over in bed (including adjusting bedclothes, sheets and blankets)?: 3  Sitting down on and standing up from a chair with arms (e.g., wheelchair, bedside commode, etc.): 2  Moving from lying on back to sitting on the side of the bed?: 3  Moving to and from a bed to a chair (including a wheelchair)?: 2  Need to walk in hospital room?: 2  Climbing 3-5 steps with a railing?: 1  Basic Mobility Total Score: 13       Therapeutic Activities and Exercises:   Transferred bed>chair. Pt escort then arrived to transport pt to dialysis. Performed static standing with minAx2 x~30 sec with medial-lateral weight-shifts. B knees blocked throughout and cues provided for increased trunk, knee, and hip ext. Posterior lean noted; cues provided for appropriate weight-shift/balance corrections. Returned to sit in chair with cues for slow, controlled descent. Pt taken to [facility]  via chair. Stand pivot transfer chair>stretcher. Seated rest breaks between standing trials. (see above for further mobility details)    Patient left supine on transport stretcher with all lines intact and pt escort and pt's mother present..    GOALS:   Multidisciplinary Problems     Physical Therapy Goals        Problem: Physical Therapy Goal    Goal Priority Disciplines Outcome Goal Variances Interventions   Physical Therapy Goal     PT, PT/OT Ongoing (interventions implemented as appropriate)     Description:  Goals to be met by: 2018     Patient will increase functional independence with mobility by performin. Supine to sit with Modified [place] -not met  2. Sit to stand transfer with [place] -not met  3. Bed to chair transfer with independence using no AD -not met  4. Gait  x150 feet with Supervision using LRAD. -not met  5. Lower extremity exercise program x20 reps per handout, with independence -not met                     Multidisciplinary Problems (Resolved)        Problem: Physical Therapy Goal    Goal Priority Disciplines Outcome Goal Variances Interventions   Physical Therapy Goal   (Resolved)     PT, PT/OT Resolved for Upgrade                     Time Tracking:     PT Received On: 18  PT Start Time: 08     PT Stop Time: 0858  PT Total Time (min): 17 min     Billable Minutes: Neuromuscular Re-education 17   (co-tx with OT)    Treatment Type: Treatment  PT/PTA: PT     PTA Visit Number: 0     Ramandeep Kumar, PT, DPT   2018  [phone]

## 2018-12-24 NOTE — ASSESSMENT & PLAN NOTE
- H&H stable. Monitor with daily labs.   - Chest/Abd/pelvis CT 12/4- no fluid to be drainged per transplant surgeon.  No source of bleeding.     - Last liver US 11/27. Evolving peritransplant hematomas with anechoic fluid collection adjacent to the right hepatic lobe.  Small volume perihepatic free fluid.   done

## 2018-12-24 NOTE — PLAN OF CARE
Problem: Patient Care Overview  Goal: Plan of Care Review  Outcome: Ongoing (interventions implemented as appropriate)  * AAO x 4  * Blood glucose monitoring AC & HS. Blood glucose reading 99 no sliding scale needed per MAR. See chart for all blood glucose readings.  * 2 gm Na diet patient not eating no appetite noted. See chart for % eaten. Patient NPO at midnight for EGD in the AM patient aware and stated understanding.  * No edema noted.  * Fall on 12/18/18, no injury noted.   * Bed at lowest position and locked, call light within reach, non-slip socks on, and side rails up x 2.  Encouraged patient to call for assistance when needed patient stated understanding.  * RSC perm-a-cath (11/7/18) patent, dressing clean dry and intact no signs or symptoms of infection noted. Dressing due to be changed on 12/30/18.  * Right upper arm midline (12/3/18) patent, dressing clean dry and intact no signs or symptoms of infection noted. Dressing due to be changed on 12/30/18. Site to be changed on 12/31/18.  * Jaundice noted the skin and sclera.  * Oxygen saturation 95 % on room air.  Respirations even and unlabored no signs or symptoms of distress noted.  * Patient has no complaints of pain at this time.  * Self medications preformed by wife who is not at bedside, blue card updated medication bags refill. All questions and concerns addressed by this RN.   * Skin assessment preformed no breakdown noted.  * Standard precautions maintained.  * Patient able to turn self no assistance needed.  * No urine output noted patient is anuric and on HD MWF.  * Visi continuous monitoring in use, see flow sheet for readings

## 2018-12-24 NOTE — SUBJECTIVE & OBJECTIVE
Interval History: Patient tolerated iHD today w/o complications, UF 1 L. Patient s/p EGD today with GI.       Review of patient's allergies indicates:   Allergen Reactions    Penicillins Nausea And Vomiting and Rash     Full body rash from cefepime as well     Current Facility-Administered Medications   Medication Frequency    0.9%  NaCl infusion PRN    acetaminophen tablet 650 mg Q6H PRN    albuterol-ipratropium 2.5 mg-0.5 mg/3 mL nebulizer solution 3 mL Q4H PRN    artificial tears 0.5 % ophthalmic solution 1 drop PRN    aspirin chewable tablet 81 mg Daily    bacitracin ointment PRN    bisacodyl suppository 10 mg Daily PRN    dextrose 50% injection 12.5 g PRN    dextrose 50% injection 25 g PRN    epoetin sherlyn injection 4,200 Units Every Mon, Wed, Fri    ergocalciferol capsule 50,000 Units Q7 Days    escitalopram oxalate tablet 20 mg Nightly    glucagon (human recombinant) injection 1 mg PRN    glucose chewable tablet 16 g PRN    glucose chewable tablet 24 g PRN    heparin (porcine) injection 1,000 Units PRN    heparin (porcine) injection 5,000 Units Q8H    hydrALAZINE injection 10 mg Q6H PRN    isavuconazonium sulfate Cap 372 mg Daily    lidocaine 5 % patch 1 patch Q24H    metoprolol tartrate (LOPRESSOR) split tablet 12.5 mg BID    midodrine tablet 10 mg TID PRN    mirtazapine tablet 7.5 mg QHS    ondansetron injection 4 mg Q6H PRN    pantoprazole EC tablet 40 mg BID AC    ramelteon tablet 8 mg Nightly PRN    sulfamethoxazole-trimethoprim 400-80mg per tablet 1 tablet Every Mon, Wed, Fri    tacrolimus capsule 3 mg BID    tiZANidine tablet 2 mg Q8H PRN    traMADol tablet 50 mg Q6H PRN    traMADol tablet 50 mg Q6H PRN    ursodiol capsule 300 mg BID    valganciclovir 50 mg/ml oral solution 100 mg Once per day on Mon Wed Fri       Objective:     Vital Signs (Most Recent):  Temp: 97.9 °F (36.6 °C) (12/24/18 1516)  Pulse: 94 (12/24/18 1531)  Resp: (!) 22 (12/24/18 1531)  BP: 136/88  (12/24/18 1516)  SpO2: 99 % (12/24/18 1531)  O2 Device (Oxygen Therapy): room air (12/24/18 1531) Vital Signs (24h Range):  Temp:  [97.8 °F (36.6 °C)-98.8 °F (37.1 °C)] 97.9 °F (36.6 °C)  Pulse:  [] 94  Resp:  [16-35] 22  SpO2:  [96 %-100 %] 99 %  BP: (106-183)/() 136/88     Weight: 66.3 kg (146 lb 2.6 oz) (12/24/18 0610)  Body mass index is 20.97 kg/m².  Body surface area is 1.81 meters squared.    I/O last 3 completed shifts:  In: 600 [P.O.:600]  Out: 200 [Urine:200]    Physical Exam   Constitutional: He is oriented to person, place, and time. He appears well-developed. No distress.   Temporal and distal extremity muscle wasting noted.   HENT:   Head: Normocephalic and atraumatic.   Mouth/Throat: Oropharynx is clear and moist. No oropharyngeal exudate.   Neck: Normal range of motion. Neck supple. No JVD present.   Cardiovascular: Normal rate, regular rhythm, normal heart sounds and intact distal pulses.   No murmur heard.  Pulmonary/Chest: Effort normal. No respiratory distress. He has no decreased breath sounds. He has no wheezes. He exhibits no tenderness.   Abdominal: Soft. Bowel sounds are normal. He exhibits no distension. There is no tenderness.   Musculoskeletal: Normal range of motion. He exhibits no edema or tenderness.   Neurological: He is alert and oriented to person, place, and time. He has normal reflexes.   Skin: Skin is warm and dry. He is not diaphoretic. No pallor.   Psychiatric: He has a normal mood and affect. His behavior is normal. Thought content normal. His affect is not labile. He is not slowed. Cognition and memory are not impaired.   Nursing note and vitals reviewed.      Significant Labs:  CBC:   Recent Labs   Lab 12/24/18  0718   WBC 15.19*   RBC 3.11*   HGB 8.8*   HCT 27.7*      MCV 89   MCH 28.3   MCHC 31.8*     CMP:   Recent Labs   Lab 12/24/18  0718   GLU 88   CALCIUM 8.9   ALBUMIN 2.1*   PROT 5.9*      K 3.3*   CO2 22*      BUN 23*   CREATININE 2.5*    ALKPHOS 161*   ALT 9*   AST 11   BILITOT 1.3*

## 2018-12-24 NOTE — PROGRESS NOTES
Patient tolerated procedure.  VSS and no distress noted.  Report called to GISELL Corona.  Patient denies pain and nausea.  Awaiting transport at this time.

## 2018-12-24 NOTE — SUBJECTIVE & OBJECTIVE
Scheduled Meds:   sodium chloride 0.9%   Intravenous Once    aspirin  81 mg Oral Daily    epoetin sherlyn (PROCRIT) injection  50 Units/kg (Dosing Weight) Intravenous Every Mon, Wed, Fri    ergocalciferol  50,000 Units Oral Q7 Days    escitalopram oxalate  20 mg Oral Nightly    heparin (porcine)  5,000 Units Subcutaneous Q8H    isavuconazonium sulfate  372 mg Oral Daily    lidocaine  1 patch Transdermal Q24H    metoprolol tartrate  12.5 mg Oral BID    mirtazapine  7.5 mg Oral QHS    pantoprazole  40 mg Oral BID AC    sulfamethoxazole-trimethoprim 400-80mg  1 tablet Oral Every Mon, Wed, Fri    tacrolimus  3 mg Oral BID    ursodiol  300 mg Oral BID    valganciclovir 50 mg/ml  100 mg Oral Once per day on Mon Wed Fri     Continuous Infusions:  PRN Meds:sodium chloride 0.9%, acetaminophen, albuterol-ipratropium, artificial tears, bacitracin, bisacodyl, dextrose 50%, dextrose 50%, glucagon (human recombinant), glucose, glucose, heparin (porcine), hydrALAZINE, midodrine, ondansetron, ramelteon, tiZANidine, traMADol, traMADol    Review of Systems   Constitutional: Positive for activity change and appetite change (decreased). Negative for fatigue and fever.   HENT: Negative for congestion, facial swelling, sore throat and voice change.    Eyes: Negative for pain, discharge and visual disturbance.   Respiratory: Negative for apnea, cough, choking, chest tightness, shortness of breath and wheezing.    Cardiovascular: Negative for chest pain, palpitations and leg swelling.   Gastrointestinal: Positive for nausea. Negative for abdominal distention, abdominal pain, constipation, diarrhea and vomiting.   Endocrine: Negative.    Genitourinary: Positive for decreased urine volume. Negative for difficulty urinating, dysuria, hematuria and urgency.   Musculoskeletal: Negative.  Negative for back pain, gait problem, neck pain and neck stiffness.   Skin: Positive for wound. Negative for color change and pallor.    Allergic/Immunologic: Positive for immunocompromised state.   Neurological: Positive for weakness. Negative for dizziness, seizures, light-headedness and headaches.   Psychiatric/Behavioral: Negative for behavioral problems, confusion, decreased concentration, dysphoric mood, hallucinations, sleep disturbance and suicidal ideas. The patient is not nervous/anxious.      Objective:     Vital Signs (Most Recent):  Temp: 98.3 °F (36.8 °C) (12/24/18 0905)  Pulse: 97 (12/24/18 1119)  Resp: (!) 25 (12/24/18 1100)  BP: (!) 155/99 (12/24/18 1100)  SpO2: 99 % (12/24/18 1100) Vital Signs (24h Range):  Temp:  [98.3 °F (36.8 °C)-98.7 °F (37.1 °C)] 98.3 °F (36.8 °C)  Pulse:  [] 97  Resp:  [16-35] 25  SpO2:  [96 %-100 %] 99 %  BP: (130-183)/() 155/99     Weight: 66.3 kg (146 lb 2.6 oz)  Body mass index is 20.97 kg/m².    Intake/Output - Last 3 Shifts       12/22 0700 - 12/23 0659 12/23 0700 - 12/24 0659 12/24 0700 - 12/25 0659    P.O. 385 570     I.V. (mL/kg)       Other       Total Intake(mL/kg) 385 (5.8) 570 (8.6)     Urine (mL/kg/hr) 100 (0.1) 200 (0.1)     Other       Stool 0      Total Output 100 200     Net +285 +370            Stool Occurrence 1 x            Physical Exam   Constitutional: He is oriented to person, place, and time. He appears well-developed. No distress.   Temporal and distal extremity muscle wasting noted.   HENT:   Head: Normocephalic and atraumatic.   Mouth/Throat: Oropharynx is clear and moist. No oropharyngeal exudate.   Eyes: EOM are normal.   Neck: Normal range of motion. Neck supple. No JVD present.   Cardiovascular: Normal rate, regular rhythm, normal heart sounds and intact distal pulses.   No murmur heard.  Pulmonary/Chest: Effort normal. No respiratory distress. He has decreased breath sounds in the right lower field and the left lower field. He has no wheezes. He exhibits no tenderness.   Abdominal: Soft. Bowel sounds are normal. He exhibits no distension. There is no tenderness.    Chevron inc clean, dry, intact with steri strips in place     Musculoskeletal: Normal range of motion. He exhibits no edema or tenderness.   Neurological: He is alert and oriented to person, place, and time. He has normal reflexes.   Skin: Skin is warm and dry. Capillary refill takes 2 to 3 seconds. He is not diaphoretic. No pallor.   Psychiatric: He has a normal mood and affect. His behavior is normal. Thought content normal. His affect is not labile. He is not slowed. Cognition and memory are not impaired.   Nursing note and vitals reviewed.      Laboratory:  Immunosuppressants         Stop Route Frequency     tacrolimus capsule 3 mg      -- Oral 2 times daily        CBC:   Recent Labs   Lab 12/24/18  0718   WBC 15.19*   RBC 3.11*   HGB 8.8*   HCT 27.7*      MCV 89   MCH 28.3   MCHC 31.8*     CMP:   Recent Labs   Lab 12/24/18 0718   GLU 88   CALCIUM 8.9   ALBUMIN 2.1*   PROT 5.9*      K 3.3*   CO2 22*      BUN 23*   CREATININE 2.5*   ALKPHOS 161*   ALT 9*   AST 11   BILITOT 1.3*     Tacrolimus Lvl   Date Value Ref Range Status   12/24/2018 4.6 (L) 5.0 - 15.0 ng/mL Final     Comment:     Testing performed by Liquid Chromatography-Tandem  Mass Spectrometry (LC-MS/MS).  This test was developed and its performance characteristics  determined by [facility], Department of Pathology  and Laboratory Medicine in a manner consistent with CLIA  requirements. It has not been cleared or approved by the [place]  Food and Drug Administration.  This test is used for clinical   purposes.  It should not be regarded as investigational or for  research.     12/23/2018 5.5 5.0 - 15.0 ng/mL Final     Comment:     Testing performed by Liquid Chromatography-Tandem  Mass Spectrometry (LC-MS/MS).  This test was developed and its performance characteristics  determined by [facility], Department of Pathology  and Laboratory Medicine in a manner consistent with CLIA  requirements. It has not been  cleared or approved by the [place]  Food and Drug Administration.  This test is used for clinical   purposes.  It should not be regarded as investigational or for  research.     12/22/2018 6.4 5.0 - 15.0 ng/mL Final     Comment:     Testing performed by Liquid Chromatography-Tandem  Mass Spectrometry (LC-MS/MS).  This test was developed and its performance characteristics  determined by [facility], Department of Pathology  and Laboratory Medicine in a manner consistent with CLIA  requirements. It has not been cleared or approved by the [place]  Food and Drug Administration.  This test is used for clinical   purposes.  It should not be regarded as investigational or for  research.     12/21/2018 7.8 5.0 - 15.0 ng/mL Final     Comment:     Testing performed by Liquid Chromatography-Tandem  Mass Spectrometry (LC-MS/MS).  This test was developed and its performance characteristics  determined by [facility], Department of Pathology  and Laboratory Medicine in a manner consistent with CLIA  requirements. It has not been cleared or approved by the [place]  Food and Drug Administration.  This test is used for clinical   purposes.  It should not be regarded as investigational or for  research.         Labs within the past 24 hours have been reviewed.    Diagnostic Results:  I have personally reviewed all pertinent imaging studies.

## 2018-12-24 NOTE — ASSESSMENT & PLAN NOTE
- Dietician following. Severe temporal, clavicle muscle depletion noted. Severe subq fat loss  - Nutrition has been poor over the past 24 hours.   - Discussed at length with patient and caregivers that if patient's nutrition status does not improve, will need supplemental nutrition via tube feeds or TPN.  - Appetite stimulant started 12/19.   - Caregivers to bring in outside food for patient.   - Continue calorie count.  - EGD Monday 12/24 as pt c/o poor appetite + burning in chest/esophagus and occasionally vomiting after eating x several weeks.   - Have requested GI to place JHON tube if EGD unremarkable.  - will start TF per JHON if EGD unremarkable.

## 2018-12-24 NOTE — PROGRESS NOTES
Arrived to dialysis via stretcher. AAO x 4. NAD noted. Agreed to 3.5 hour acute Tx. CVC ports to rt. Chest wall flush and aspirate well.

## 2018-12-24 NOTE — ASSESSMENT & PLAN NOTE
- LFTs now improving slowly.  T bili was 37.6 day of transplant.  - Drain placed during take back. Drain placed to intra-abdominal abscess on 11/22.   - Fluid from collection noted with enterococcus VRE completed Zyvox treatment.  - Routine 1 month Liver US 12/17 which showed elevated velocity of hepatic artery and low RIs.   - Vascular Surgery consulted. Recommend repeat US in 10-14 days (12/27-12/31) to reassess. Appreciate Vascular recs.  LFTs stable.

## 2018-12-24 NOTE — PLAN OF CARE
Problem: Occupational Therapy Goal  Goal: Occupational Therapy Goal  Goals to be met by: 12/31/18 ( Goals revised: 12/18)    Patient will increase functional independence with ADLs by performing:    UE Dressing with Supervision.   LE Dressing with Minimal Assistance.( met with socks; continue with pants)  Grooming while standing at sink with Stand-by Assistance.  Toileting from toilet with Moderate Assistance for hygiene and clothing management.   Toilet transfer to toilet with moderate assistance.  Upper extremity  theraband exercise program x  15 reps  with independence. Met 12/9            Outcome: Ongoing (interventions implemented as appropriate)  Continue with POC.   Julia ladd OT  12/24/2018

## 2018-12-24 NOTE — ASSESSMENT & PLAN NOTE
- c/o increased pain w deep breath today to right side.  CXR showed increased opacity in the inferior hemothorax on the right side since 11/26/2018.  Pulse ox 97-99% on RA.  Cont to monitor.   - continues to have fluid to RLL.  WBC remains elevated.    - thoracentesis performed on 11/30. Fluid noted with 4k WBC, 93 SEGS. cx no growth to date.  Cefepime added for treatment.  - Chest CT showing right pleural effusion improved, left w increased pleural effusion.   - Per ID, completed treatment of empyema w Cefepime thru 12/8.  - sats remain 97-99% on RA.  - CXR 12/20 showed left hemidiaphragmatic margin that is better delineated than on 12/19/2018, consistent with improved aeration in the left lower lobe. + no significant detrimental interval change in the appearance of the chest, with the current examination demonstrating a slightly improved inspiratory depth level.    - denies SOB.  Last CXR 12/22 w improvement.

## 2018-12-24 NOTE — PROGRESS NOTES
JHON placed at 65cm.  Awaiting KUB for placement verification.  Patient tolerated placement well.  No coughing, gaging, or SOB noted.  Will continue to monitor patient.

## 2018-12-24 NOTE — DISCHARGE INSTRUCTIONS

## 2018-12-24 NOTE — ASSESSMENT & PLAN NOTE
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     Plan:  - Patient oliguric (making some urine, 200 mL/24 hr).   - HD x 3.5 hrs today for metabolic clearance and volume management, UF 1 L.   - Phos 3.4, not on binders   - Hgb 8.8, remains on Epo 4200 units MWF   - Strict I/O and chart  - Avoid nephrotoxic medications  - Maintain MAP >65 please continue midodrine  - please keep Hb > 7 gm/dL  - Medication doses adjusted to GFR  - Renal diet  - EGD today with GI 2/2 poor intake

## 2018-12-24 NOTE — NURSING TRANSFER
Nursing Transfer Note      12/24/2018     Transfer from [facility] 34 to 1108    Transfer via stretcher    Transfer with telemetry    Transported by pt. transport    Medicines sent: na    Chart send with patient: yes    Notified: GISELL Corona    Patient reassessed at: 12/24/2018 1444    Upon arrival to floor: nurse notified of arrival and patient assisted to bed,  Bed locked and in lowest position with call bell within reach.

## 2018-12-24 NOTE — INTERVAL H&P NOTE
The patient has been examined and the H&P has been reviewed:    I concur with the findings and no changes have occurred since H&P was written.    Anesthesia/Surgery risks, benefits and alternative options discussed and understood by patient/family.          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]  No    Hypomagnesemia [E83.42]  Yes    Severe malnutrition [E43]  Yes     Assessment and Plan  Severe Protein-Calorie Malnutrition  Malnutrition in the context ofChronic Illness/Injury     Related to (etiology):  Poor oral intake in the setting of ESLD and complications s/p liver transplant 11/11     Signs and Symptoms (as evidenced by):  Energy Intake: <50%of estimated energy requirement for 3 months  Body Fat Depletion: severe overall subcutaneous fat loss and depletion of orbital area as well as protruding patellas and acromion process  Muscle Mass Depletion: severe  temporal, clavicle, calf and quadricep muscle wasting   Weight Loss: 27% x 3 months   Recommendations     Recommendation/Intervention: 1. Liberalize diet to Regular.  2. Encourage multiple small meals. 3. D/c renal supplement--pt will not drink it.  Goals: 1. Pt to receive % EEN and EPN during stay.        Anxiety [F41.9]  No    Alcohol use disorder, severe, in early remission, dependence [F10.21]  Yes    Prolonged Q-T interval on ECG [R94.31]  No    Decreased appetite [R63.0]  Yes    At risk for opportunistic infections [Z91.89]  No    Long-term use of immunosuppressant medication [Z79.899]  Not Applicable    Intra-abdominal abscess [K65.1]  No    Acute renal failure with acute tubular necrosis superimposed on stage 3 chronic kidney disease [N17.0, N18.3]  Yes    Leukocytosis [D72.829]  No      54 y/o M h/o DM2, ETOH dependence c/b hepatitis admitted 10/27 for acute liver failure (c/b PSE, HRS, hepatic hydrothorax) and transplant evaluation  s/p DBDLT 11/11/18( CMV D+/R-, steroid induction, MMF/tacro maintenance) c/b significant blood product transfusions.  Patient cleared the delirium and most recently went to OR x 2 for leaks now with wound vac in place - no fever but elevted WBC prompting ID consult.  Weaned off pressors today and stable oxygenation as per nursing - no new issues       Prophylactic immunotherapy [Z29.8]  Not Applicable    Adrenal cortical steroids causing adverse effect in therapeutic use [T38.0X5A]  No    Weakness [R53.1]  Unknown    Anemia of chronic disease [D63.8]  Yes    Type 2 diabetes mellitus without complication [E11.9]  Yes    Pleural effusion associated with hepatic disorder [K76.9, J91.8]  Yes      Resolved Hospital Problems    Diagnosis Date Resolved POA    DEL (acute kidney injury) [N17.9] 12/21/2018 Unknown    Moderate malnutrition [E44.0] 12/21/2018 Unknown    Nausea [R11.0] 12/17/2018 No    Acute delirium [R41.0] 12/14/2018 No    Hyperkalemia [E87.5] 12/22/2018 No    Sequelae of hyperalimentation [E68] 11/25/2018 No    Acute encephalopathy [G93.40] 12/07/2018 No    Acute hyperglycemia [R73.9] 11/18/2018 Unknown    End stage liver disease [K72.90] 11/12/2018 Yes    Acute maculopapular rash [R21] 11/26/2018 Yes    Chronic kidney disease-mineral and bone disorder [N18.9, E83.9, M89.9] 11/02/2018 Yes    Alcohol use disorder, severe, in early remission [F10.21] 11/26/2018 Yes    Decompensated hepatic cirrhosis [K72.90] 11/26/2018 Yes    Acute liver failure without hepatic coma [K72.00] 11/12/2018 Yes    Alcoholic hepatitis with ascites [K70.11] 11/12/2018 Yes    Acute liver failure [K72.00] 11/26/2018 Yes    Jaundice [R17] 11/26/2018 Yes    Severe alcohol dependence [F10.20] 11/26/2018 Yes    Coagulopathy [D68.9] 11/26/2018 Yes    Thrombocytopenia  [D69.6] 11/26/2018 Yes    Hyponatremia [E87.1] 11/26/2018 Yes    Hepatic encephalopathy [K72.90] 11/12/2018 Yes    Sepsis [A41.9] 10/29/2018 Yes    Metabolic acidosis [E87.2] 11/26/2018 Yes    Moderate protein malnutrition [E44.0] 11/26/2018 Yes

## 2018-12-24 NOTE — PLAN OF CARE
Problem: Physical Therapy Goal  Goal: Physical Therapy Goal  Goals to be met by: 2018     Patient will increase functional independence with mobility by performin. Supine to sit with Modified [place] -not met  2. Sit to stand transfer with [place] -not met  3. Bed to chair transfer with independence using no AD -not met  4. Gait  x150 feet with Supervision using LRAD. -not met  5. Lower extremity exercise program x20 reps per handout, with independence -not met          Outcome: Ongoing (interventions implemented as appropriate)  Goals reviewed and remain appropriate. Pt progressing towards goals.    Ramandeep Kumar, PT, DPT   2018  [phone]

## 2018-12-24 NOTE — PROGRESS NOTES
Patient transferred to HD per stretcher by transport aide. Report called to receiving nurse. No acute distress noted. Will monitor for return.

## 2018-12-24 NOTE — [employer] PATIENT INSTRUCTIONS
Discharge Summary/Instructions after an Endoscopic Procedure  Patient Name: Femi Enciso  Patient MRN: [id number]  Patient YOB: 1965 Monday, December 24, 2018  Duarte Jules MD  RESTRICTIONS:  During your procedure today, you received medications for sedation.  These   medications may affect your judgment, balance and coordination.  Therefore,   for 24 hours, you have the following restrictions:   - DO NOT drive a car, operate machinery, make legal/financial decisions,   sign important papers or drink alcohol.    ACTIVITY:  Today: no heavy lifting, straining or running due to procedural   sedation/anesthesia.  The following day: return to full activity including work.  DIET:  Eat and drink normally unless instructed otherwise.     TREATMENT FOR COMMON SIDE EFFECTS:  - Mild abdominal pain, nausea, belching, bloating or excessive gas:  rest,   eat lightly and use a heating pad.  - Sore Throat: treat with throat lozenges and/or gargle with warm salt   water.  - Because air was used during the procedure, expelling large amounts of air   from your rectum or belching is normal.  - If a bowel prep was taken, you may not have a bowel movement for 1-3 days.    This is normal.  SYMPTOMS TO WATCH FOR AND REPORT TO YOUR PHYSICIAN:  1. Abdominal pain or bloating, other than gas cramps.  2. Chest pain.  3. Back pain.  4. Signs of infection such as: chills or fever occurring within 24 hours   after the procedure.  5. Rectal bleeding, which would show as bright red, maroon, or black stools.   (A tablespoon of blood from the rectum is not serious, especially if   hemorrhoids are present.)  6. Vomiting.  7. Weakness or dizziness.  GO DIRECTLY TO THE NEAREST EMERGENCY ROOM IF YOU HAVE ANY OF THE FOLLOWING:      Difficulty breathing              Chills and/or fever over 101 F   Persistent vomiting and/or vomiting blood   Severe abdominal pain   Severe chest pain   Black, tarry stools   Bleeding- more than one tablespoon   Any  other symptom or condition that you feel may need urgent attention  Your doctor recommends these additional instructions:  If any biopsies were taken, your doctors clinic will contact you in 1 to 2   weeks with any results.  - [facility] care.   - Advance diet as tolerated.   - Continue present medications.   - Await pathology results.  For questions, problems or results please call your physician - Duarte Jules MD at Work:  [phone].  [facility], EMERGENCY ROOM PHONE NUMBER: [phone]  IF A COMPLICATION OR EMERGENCY SITUATION ARISES AND YOU ARE UNABLE TO REACH   YOUR PHYSICIAN - GO DIRECTLY TO THE EMERGENCY ROOM.  Duarte Jules MD  12/24/2018 1:56:14 PM  This report has been verified and signed electronically.  [employer]

## 2018-12-24 NOTE — TRANSFER OF CARE
"Anesthesia Transfer of Care Note    Patient: Femi Enciso    Procedure(s) Performed: Procedure(s) (LRB):  EGD (ESOPHAGOGASTRODUODENOSCOPY) (N/A)    Patient location: [facility]    Anesthesia Type: general    Transport from OR: Transported from OR on room air with adequate spontaneous ventilation    Post pain: adequate analgesia    Post assessment: no apparent anesthetic complications and tolerated procedure well    Post vital signs: stable    Level of consciousness: awake, alert and oriented    Nausea/Vomiting: no nausea/vomiting    Complications: none    Transfer of care protocol was followed      Last vitals:   Visit Vitals  BP (!) 146/94   Pulse 103   Temp 36.6 °C (97.8 °F)   Resp 18   Ht 5' 10" (1.778 m)   Wt 66.3 kg (146 lb 2.6 oz)   SpO2 99%   BMI 20.97 kg/m²     "

## 2018-12-24 NOTE — TREATMENT PLAN
EGD done today    Impression:           - Normal esophagus.                        - Erythematous mucosa in the antrum. Biopsied.                        - Nodular mucosa in the duodenal bulb. Biopsied.  Recommendation:       - [facility] care.                        - Advance diet as tolerated.                        - Continue present medications.                        - Await pathology results.

## 2018-12-24 NOTE — ASSESSMENT & PLAN NOTE
- Continue appetite stimulant.  - GI consulted as pt c/o burning sensation in chest/esophagus + vomiting after eating, passed SLP eval, recommended GI f/u.  - Cont to encourage PO intake. Ordered additional supplements.   - EGD Monday, 12/24.   IF scope unremarkable, plan to request GI to place J[facility]ON for  tube feeds to supplement nutrition.

## 2018-12-24 NOTE — PT/OT/SLP PROGRESS
Occupational Therapy   Treatment    Name: Femi Enciso  MRN: [id number]  Admitting Diagnosis:  S/P liver transplant  36 Days Post-Op    Recommendations:     Discharge Recommendations: nursing facility, skilled  Discharge Equipment Recommendations:  (TBD-- pending progress)  Barriers to discharge:  None    Subjective     Pain/Comfort:  · Pain Rating 1: 0/10  · Pain Rating Post-Intervention 1: 0/10    Objective:     Communicated with: RN prior to session.  Patient found with all lines intact, call button in reach and RN notified and telemetry upon OT entry to room.    General Precautions: Standard, fall   Orthopedic Precautions:N/A   Braces: N/A     Occupational Performance:    Bed Mobility:    · Patient completed Rolling/Turning to Right with stand by assistance and with side rail  · Patient completed Supine to Sit with contact guard assistance  · Patient completed Sit to Supine with contact guard assistance     Functional Mobility/Transfers:  · Patient completed Sit <> Stand Transfer with minimum assistance and of 2 persons  with  hand-held assist from EOB; pt required mod x2 from bedside chair  -- he completed x1 trial from EOB; x3 trials from bedside chair   · Patient completed Bed <> Chair Transfer using Stand Pivot technique with moderate assistance and of 2 persons increased to maximum x2 with hand-held assist   - Pt completed EOB < chair transfer and chair < stretcher transfer with mod-max x2 ( pt required blocking of B knees and assistance with hip extension; pt attempting to descending prior to reaching chair/strecher requiring max cues for safety.   · Functional Mobility: pt took ~5-6 steps during each transfer ( requires cues for increased stride length)    Activities of Daily Living:  · Upper Body Dressing: minimum assistance with set-up assistance to milady gown like jacket while seated EOB    [facility] 6 Click ADL: 17    Treatment & Education:  -Pt edu on OT role/POC  -Importance of OOB activity with staff  assistance  -Safety during functional t/f and mobility  - White board updated  - Multiple self care tasks completed-- assistance level noted above  - All questions/concerns answered within OT scope of practice    Patient left supine on stretcher with transport with all lines intact, call button in reach and RN notified   Pt going to EGD and dialysis   Education:    Assessment:     Femi Enciso is a 53 y.o. male with a medical diagnosis of S/P liver transplant. Pt alert and willing to participate in therapy session. He presents with the following performance deficits affecting function are weakness, impaired endurance, gait instability, impaired balance, impaired self care skills, impaired functional mobilty, decreased lower extremity function, decreased safety awareness, impaired cardiopulmonary response to activity. He mod-max assistance with functional transfers and mobility this date. Pt would benefit from SNF following d/c to continue to progress towards goals and improve quality of life.     Rehab Prognosis:  Good; patient would benefit from acute skilled OT services to address these deficits and reach maximum level of function.       Plan:     Patient to be seen 4 x/week to address the above listed problems via self-care/home management, therapeutic activities, therapeutic exercises, neuromuscular re-education  · Plan of Care Expires: 01/20/19  · Plan of Care Reviewed with: patient    This Plan of care has been discussed with the patient who was involved in its development and understands and is in agreement with the identified goals and treatment plan    GOALS:   Multidisciplinary Problems     Occupational Therapy Goals        Problem: Occupational Therapy Goal    Goal Priority Disciplines Outcome Interventions   Occupational Therapy Goal     OT, PT/OT Ongoing (interventions implemented as appropriate)    Description:  Goals to be met by: 12/31/18 ( Goals revised: 12/18)    Patient will increase functional  independence with ADLs by performing:    UE Dressing with Supervision.   LE Dressing with Minimal Assistance.( met with socks; continue with pants)  Grooming while standing at sink with Stand-by Assistance.  Toileting from toilet with Moderate Assistance for hygiene and clothing management.   Toilet transfer to toilet with moderate assistance.  Upper extremity  theraband exercise program x  15 reps  with independence. Met 12/9                       Multidisciplinary Problems (Resolved)        Problem: Occupational Therapy Goal    Goal Priority Disciplines Outcome Interventions   Occupational Therapy Goal   (Resolved)     OT, PT/OT Resolved for Upgrade                    Time Tracking:     OT Date of Treatment: 12/24/18  OT Start Time: 0840  OT Stop Time: 0858  OT Total Time (min): 18 min    Billable Minutes:Therapeutic Activity 18    Julia ladd OT  12/24/2018

## 2018-12-25 PROBLEM — R11.2 NON-INTRACTABLE VOMITING WITH NAUSEA: Status: ACTIVE | Noted: 2018-12-11

## 2018-12-25 LAB
ALBUMIN SERPL BCP-MCNC: 2 G/DL
ALP SERPL-CCNC: 153 U/L
ALT SERPL W/O P-5'-P-CCNC: 8 U/L
ANION GAP SERPL CALC-SCNC: 10 MMOL/L
AST SERPL-CCNC: 10 U/L
BACTERIA #/AREA URNS AUTO: ABNORMAL /HPF
BASOPHILS # BLD AUTO: 0.31 K/UL
BASOPHILS NFR BLD: 2.6 %
BILIRUB SERPL-MCNC: 1.3 MG/DL
BILIRUB UR QL STRIP: NEGATIVE
BUN SERPL-MCNC: 13 MG/DL
CALCIUM SERPL-MCNC: 8.9 MG/DL
CHLORIDE SERPL-SCNC: 106 MMOL/L
CLARITY UR REFRACT.AUTO: ABNORMAL
CO2 SERPL-SCNC: 22 MMOL/L
COLOR UR AUTO: ABNORMAL
CREAT SERPL-MCNC: 1.7 MG/DL
DIFFERENTIAL METHOD: ABNORMAL
EOSINOPHIL # BLD AUTO: 0.7 K/UL
EOSINOPHIL NFR BLD: 5.9 %
ERYTHROCYTE [DISTWIDTH] IN BLOOD BY AUTOMATED COUNT: 16 %
EST. GFR  (AFRICAN AMERICAN): 52.1 ML/MIN/1.73 M^2
EST. GFR  (NON AFRICAN AMERICAN): 45 ML/MIN/1.73 M^2
GLUCOSE SERPL-MCNC: 107 MG/DL
GLUCOSE UR QL STRIP: NEGATIVE
HCT VFR BLD AUTO: 28.2 %
HGB BLD-MCNC: 8.6 G/DL
HGB UR QL STRIP: ABNORMAL
HYALINE CASTS UR QL AUTO: 6 /LPF
IMM GRANULOCYTES # BLD AUTO: 0.16 K/UL
IMM GRANULOCYTES NFR BLD AUTO: 1.3 %
KETONES UR QL STRIP: NEGATIVE
LEUKOCYTE ESTERASE UR QL STRIP: ABNORMAL
LYMPHOCYTES # BLD AUTO: 1.1 K/UL
LYMPHOCYTES NFR BLD: 8.7 %
MAGNESIUM SERPL-MCNC: 1.5 MG/DL
MCH RBC QN AUTO: 28.5 PG
MCHC RBC AUTO-ENTMCNC: 30.5 G/DL
MCV RBC AUTO: 93 FL
MICROSCOPIC COMMENT: ABNORMAL
MONOCYTES # BLD AUTO: 1.8 K/UL
MONOCYTES NFR BLD: 15 %
NEUTROPHILS # BLD AUTO: 8.1 K/UL
NEUTROPHILS NFR BLD: 66.5 %
NITRITE UR QL STRIP: NEGATIVE
NRBC BLD-RTO: 0 /100 WBC
PH UR STRIP: 5 [PH] (ref 5–8)
PHOSPHATE SERPL-MCNC: 2.9 MG/DL
PLATELET # BLD AUTO: 241 K/UL
PMV BLD AUTO: 11.4 FL
POCT GLUCOSE: 120 MG/DL (ref 70–110)
POCT GLUCOSE: 122 MG/DL (ref 70–110)
POCT GLUCOSE: 148 MG/DL (ref 70–110)
POCT GLUCOSE: 155 MG/DL (ref 70–110)
POTASSIUM SERPL-SCNC: 3.5 MMOL/L
PROT SERPL-MCNC: 5.8 G/DL
PROT UR QL STRIP: ABNORMAL
RBC # BLD AUTO: 3.02 M/UL
RBC #/AREA URNS AUTO: 3 /HPF (ref 0–4)
SODIUM SERPL-SCNC: 138 MMOL/L
SP GR UR STRIP: 1.01 (ref 1–1.03)
SQUAMOUS #/AREA URNS AUTO: 0 /HPF
TACROLIMUS BLD-MCNC: 5.2 NG/ML
URN SPEC COLLECT METH UR: ABNORMAL
WBC # BLD AUTO: 12.11 K/UL
WBC #/AREA URNS AUTO: 3 /HPF (ref 0–5)

## 2018-12-25 PROCEDURE — 99233 PR SUBSEQUENT HOSPITAL CARE,LEVL III: ICD-10-PCS | Mod: 24,,, | Performed by: NURSE PRACTITIONER

## 2018-12-25 PROCEDURE — 20600001 HC STEP DOWN PRIVATE ROOM

## 2018-12-25 PROCEDURE — 99900035 HC TECH TIME PER 15 MIN (STAT)

## 2018-12-25 PROCEDURE — 83735 ASSAY OF MAGNESIUM: CPT

## 2018-12-25 PROCEDURE — 25000003 PHARM REV CODE 250: Performed by: STUDENT IN AN ORGANIZED HEALTH CARE EDUCATION/TRAINING PROGRAM

## 2018-12-25 PROCEDURE — 94640 AIRWAY INHALATION TREATMENT: CPT

## 2018-12-25 PROCEDURE — 36415 COLL VENOUS BLD VENIPUNCTURE: CPT

## 2018-12-25 PROCEDURE — 85025 COMPLETE CBC W/AUTO DIFF WBC: CPT

## 2018-12-25 PROCEDURE — 87086 URINE CULTURE/COLONY COUNT: CPT

## 2018-12-25 PROCEDURE — 84100 ASSAY OF PHOSPHORUS: CPT

## 2018-12-25 PROCEDURE — 63600175 PHARM REV CODE 636 W HCPCS: Mod: JG | Performed by: NURSE PRACTITIONER

## 2018-12-25 PROCEDURE — 81001 URINALYSIS AUTO W/SCOPE: CPT

## 2018-12-25 PROCEDURE — 25000003 PHARM REV CODE 250: Performed by: SURGERY

## 2018-12-25 PROCEDURE — 25000003 PHARM REV CODE 250: Performed by: PHYSICIAN ASSISTANT

## 2018-12-25 PROCEDURE — 63600175 PHARM REV CODE 636 W HCPCS: Performed by: NURSE PRACTITIONER

## 2018-12-25 PROCEDURE — 25000242 PHARM REV CODE 250 ALT 637 W/ HCPCS: Performed by: STUDENT IN AN ORGANIZED HEALTH CARE EDUCATION/TRAINING PROGRAM

## 2018-12-25 PROCEDURE — 80053 COMPREHEN METABOLIC PANEL: CPT

## 2018-12-25 PROCEDURE — 63600175 PHARM REV CODE 636 W HCPCS: Performed by: SURGERY

## 2018-12-25 PROCEDURE — 94664 DEMO&/EVAL PT USE INHALER: CPT

## 2018-12-25 PROCEDURE — 80197 ASSAY OF TACROLIMUS: CPT

## 2018-12-25 PROCEDURE — 25000003 PHARM REV CODE 250: Performed by: NURSE PRACTITIONER

## 2018-12-25 PROCEDURE — C9113 INJ PANTOPRAZOLE SODIUM, VIA: HCPCS | Performed by: NURSE PRACTITIONER

## 2018-12-25 PROCEDURE — 99233 SBSQ HOSP IP/OBS HIGH 50: CPT | Mod: 24,,, | Performed by: NURSE PRACTITIONER

## 2018-12-25 PROCEDURE — P9047 ALBUMIN (HUMAN), 25%, 50ML: HCPCS | Mod: JG | Performed by: NURSE PRACTITIONER

## 2018-12-25 RX ORDER — ESCITALOPRAM OXALATE 5 MG/5ML
20 SOLUTION ORAL DAILY
Status: DISCONTINUED | OUTPATIENT
Start: 2018-12-25 | End: 2019-01-08

## 2018-12-25 RX ORDER — ALBUMIN HUMAN 250 G/1000ML
25 SOLUTION INTRAVENOUS EVERY 8 HOURS
Status: COMPLETED | OUTPATIENT
Start: 2018-12-25 | End: 2018-12-26

## 2018-12-25 RX ORDER — SODIUM CHLORIDE 9 MG/ML
INJECTION, SOLUTION INTRAVENOUS ONCE
Status: COMPLETED | OUTPATIENT
Start: 2018-12-25 | End: 2018-12-26

## 2018-12-25 RX ORDER — SODIUM CHLORIDE 9 MG/ML
INJECTION, SOLUTION INTRAVENOUS
Status: DISCONTINUED | OUTPATIENT
Start: 2018-12-25 | End: 2018-12-27

## 2018-12-25 RX ORDER — PANTOPRAZOLE SODIUM 40 MG/10ML
40 INJECTION, POWDER, LYOPHILIZED, FOR SOLUTION INTRAVENOUS 2 TIMES DAILY
Status: DISCONTINUED | OUTPATIENT
Start: 2018-12-25 | End: 2019-01-08

## 2018-12-25 RX ADMIN — TACROLIMUS 1.5 MG: 1 CAPSULE ORAL at 05:12

## 2018-12-25 RX ADMIN — MIRTAZAPINE 7.5 MG: 7.5 TABLET ORAL at 08:12

## 2018-12-25 RX ADMIN — ALBUMIN HUMAN 25 G: 0.25 SOLUTION INTRAVENOUS at 01:12

## 2018-12-25 RX ADMIN — METOPROLOL TARTRATE 12.5 MG: 25 TABLET, FILM COATED ORAL at 08:12

## 2018-12-25 RX ADMIN — URSODIOL 300 MG: 300 CAPSULE ORAL at 08:12

## 2018-12-25 RX ADMIN — Medication 12.5 MG: at 08:12

## 2018-12-25 RX ADMIN — HEPARIN SODIUM 5000 UNITS: 5000 INJECTION, SOLUTION INTRAVENOUS; SUBCUTANEOUS at 01:12

## 2018-12-25 RX ADMIN — HEPARIN SODIUM 5000 UNITS: 5000 INJECTION, SOLUTION INTRAVENOUS; SUBCUTANEOUS at 05:12

## 2018-12-25 RX ADMIN — ASPIRIN 81 MG CHEWABLE TABLET 81 MG: 81 TABLET CHEWABLE at 08:12

## 2018-12-25 RX ADMIN — ESCITALOPRAM OXALATE 20 MG: 5 SOLUTION ORAL at 11:12

## 2018-12-25 RX ADMIN — HEPARIN SODIUM 5000 UNITS: 5000 INJECTION, SOLUTION INTRAVENOUS; SUBCUTANEOUS at 08:12

## 2018-12-25 RX ADMIN — IPRATROPIUM BROMIDE AND ALBUTEROL SULFATE 3 ML: .5; 3 SOLUTION RESPIRATORY (INHALATION) at 11:12

## 2018-12-25 RX ADMIN — ISAVUCONAZONIUM SULFATE 372 MG: 186 CAPSULE ORAL at 08:12

## 2018-12-25 RX ADMIN — ALBUMIN HUMAN 25 G: 0.25 SOLUTION INTRAVENOUS at 08:12

## 2018-12-25 RX ADMIN — PANTOPRAZOLE SODIUM 40 MG: 40 TABLET, DELAYED RELEASE ORAL at 05:12

## 2018-12-25 RX ADMIN — PANTOPRAZOLE SODIUM 40 MG: 40 INJECTION, POWDER, LYOPHILIZED, FOR SOLUTION INTRAVENOUS at 08:12

## 2018-12-25 RX ADMIN — TACROLIMUS 1.5 MG: 1 CAPSULE ORAL at 08:12

## 2018-12-25 NOTE — ASSESSMENT & PLAN NOTE
- See intra-abdominal abscess.  - wbc improving, cont w delirium.    - Repeat cx 12/4 with NGTD.  Completed Linezolid and Cefepime per ID.    - Wbc trended up 12/11- repeat cx 12/11.  - WBC w/ improvement 12/12  - WBC w/ some improvement today.

## 2018-12-25 NOTE — ASSESSMENT & PLAN NOTE
"- Pt c/o "burning" diffuse CP 12/21 AM  - EKG NSR, stable from prior  - Troponin wnl  - Likely GI related pain.     "

## 2018-12-25 NOTE — PLAN OF CARE
Problem: Patient Care Overview  Goal: Plan of Care Review  Outcome: Ongoing (interventions implemented as appropriate)  Pt aao x 4. VSS. Bed in low locked pos call light within reach. Pt calls for assistance when needed. TF begun to [facility] in L nare. Pt refused to swallow 9 pm meds, meds crushed and given via [facility]. Bs monitoted ac/hs, ss insulin given as ordered. Urine cx still outstanding, pt aware. HD done MWF, 1L removed during day.

## 2018-12-25 NOTE — PROGRESS NOTES
Dr. Elizondo at bedside to assess JHON placement - verified OK to use.   Patient having difficulty swallowing and gagging with water intake, MD stated OK to change prograf to sublingual.   Will carry out orders. Will continue to monitor.

## 2018-12-25 NOTE — ASSESSMENT & PLAN NOTE
- Continue appetite stimulant.  - GI consulted as pt c/o burning sensation in chest/esophagus + vomiting after eating, passed SLP eval, recommended GI f/u.  - Cont to encourage PO intake. Ordered additional supplements.   - EGD , 12/24 unremarkable. prabhakar tube placed and TF started. Plan for prealbumin in am.

## 2018-12-25 NOTE — ASSESSMENT & PLAN NOTE
- Dietician following. Severe temporal, clavicle muscle depletion noted. Severe subq fat loss  - Nutrition has been poor over the past 24 hours.   - Discussed at length with patient and caregivers that if patient's nutrition status does not improve, will need supplemental nutrition via tube feeds or TPN.  - Appetite stimulant started 12/19.   - Caregivers to bring in outside food for patient.   - Continue calorie count.  - EGD Monday 12/24 as pt c/o poor appetite + burning in chest/esophagus and occasionally vomiting after eating x several weeks.   - EGD 12/24 unremarkable. prabhakar tube placed afterwards.   - TF started for nutrition.

## 2018-12-25 NOTE — PROGRESS NOTES
[facility]  Liver Transplant  Progress Note    Patient Name: Femi Enciso  MRN: [id number]  Admission Date: 10/27/2018  Hospital Length of Stay: 59 days  Code Status: Full Code  Primary Care Provider: Primary Doctor No  Post-Operative Day: 44    ORGAN:   LIVER  Disease Etiology: Alcoholic Cirrhosis  Donor Type:    - Brain Death  CDC High Risk:   No  Donor CMV Status:   Donor CMV Status: Positive  Donor HBcAB:   Negative  Donor HCV Status:   Negative  Donor HBV JAVIER: Negative  Donor HCV JAVIER: Negative  Whole or Partial: Whole Liver  Biliary Anastomosis: End to End  Arterial Anatomy: Standard  Subjective:     History of Present Illness:  Femi Enciso is a 53yr old male with PMH of acute ETOH hepatitis and DEL/ATN evolved to ESRD requiring HD (not candidate for KTX). He is now s/p liver transplant (SM induction, DBD, CMV D+/R-). Transplant surgery noted severe collateralization, adrenal gland firmly adhered to liver. Bare area of adrenal gland required several stitches and packing to obtain fair hemostasis (EBL 15L). OR take back  for bleeding from R adrenal bed in AM (significant clot in retroperitoneum, posterior to R hepatic lobe, tract posterior to the R kidney containing significant clot, small bleeding area of retroperitoneal fat) then returned to surgery same day for hemorrhaging closed with wound vac with plans to return to OR 24-48 hours for closure (retroperitoneal /retrorenal/retrocolic hematoma on the right ). Raw retroperitoneal bleeding. Suture ligatures placed, argon beam cautery, evarest placed in retroperitoneum behind cava and right kidney. Significant oozing from upper right adrenal gland, not amenable to ligation, that portion of the adrenal gland was resected with cautery). OR take back  for washout and incisional closure, no significant bleeding or hematoma found. OR cultures   from body fluid grew enterococcus faecium VRE. ID was consulted,  started on  daptomycin for VRE treatment and cefepime/flagyl to cover for IA ty in bile. Patient with significant leukocytosis with peak on 11/22 at 48K prompting drainage of R subphrenic fluid collection, perc drain placed, culture grew VRE. Stroke code called 11/21 for lethargy and unresponsive, CT head without evidence of acute ischemic changes and CTA chest negative, vascular neurology was consulted recommended MRI brain, but patient's AMS improved shortly after event. Nephrology consulted for ESRD requiring HD. Patient overall improved on antibiotic regimen, leukocytosis and AMS improved. He was transferred to TSU on POD #15.     Hospital Course:  Pt c/o CP 12/21. EKG stable from prior. Troponin wnl. CP resolved 12/22. CXR 12/20 showed no detrimental change. Pt hypertensive prior to HD.  Dialysis tolerated well 12/21 with 1.5L taken off. Bps much improved after dialysis, but monitoring closely.    Interval History:  No acute events overnight. Pt reports feeling well this am. LFTs remain stable. WBC with some improvement. EGD unremarkable and prabhakar tube placed for TF. Pt tolerating TF well. Will increase to goal slowly. HD yesterday and 1.6L off. Will need to monitor Cr level closely for possible renal recovery. Monitor.     Scheduled Meds:   albumin human 25%  25 g Intravenous Q8H    aspirin  81 mg Oral Daily    epoetin sherlyn (PROCRIT) injection  50 Units/kg (Dosing Weight) Intravenous Every Mon, Wed, Fri    ergocalciferol  50,000 Units Oral Q7 Days    escitalopram oxalate  20 mg Oral Daily    heparin (porcine)  5,000 Units Subcutaneous Q8H    isavuconazonium sulfate  372 mg Oral Daily    lidocaine  1 patch Transdermal Q24H    metoprolol  12.5 mg Per NG tube BID    mirtazapine  7.5 mg Oral QHS    pantoprazole  40 mg Intravenous BID    sulfamethoxazole-trimethoprim 400-80mg  1 tablet Oral Every Mon, Wed, Fri    tacrolimus  1.5 mg Sublingual BID    ursodiol  300 mg Oral BID    valganciclovir 50 mg/ml  100 mg  Oral Once per day on Mon Wed Fri     Continuous Infusions:  PRN Meds:sodium chloride 0.9%, acetaminophen, albuterol-ipratropium, artificial tears, bacitracin, bisacodyl, dextrose 50%, dextrose 50%, glucagon (human recombinant), glucose, glucose, heparin (porcine), hydrALAZINE, midodrine, ondansetron, ramelteon, tiZANidine, traMADol, traMADol    Review of Systems   Constitutional: Positive for activity change and appetite change (decreased). Negative for fatigue and fever.   HENT: Negative for congestion, facial swelling, sore throat and voice change.    Eyes: Negative for pain, discharge and visual disturbance.   Respiratory: Negative for apnea, cough, choking, chest tightness, shortness of breath and wheezing.    Cardiovascular: Negative for chest pain, palpitations and leg swelling.   Gastrointestinal: Positive for nausea. Negative for abdominal distention, abdominal pain, constipation, diarrhea and vomiting.   Endocrine: Negative.    Genitourinary: Positive for decreased urine volume. Negative for difficulty urinating, dysuria, hematuria and urgency.   Musculoskeletal: Negative.  Negative for back pain, gait problem, neck pain and neck stiffness.   Skin: Positive for wound. Negative for color change and pallor.   Allergic/Immunologic: Positive for immunocompromised state.   Neurological: Positive for weakness. Negative for dizziness, seizures, light-headedness and headaches.   Psychiatric/Behavioral: Negative for behavioral problems, confusion, decreased concentration, dysphoric mood, hallucinations, sleep disturbance and suicidal ideas. The patient is not nervous/anxious.      Objective:     Vital Signs (Most Recent):  Temp: 98.9 °F (37.2 °C) (12/25/18 0800)  Pulse: 91 (12/25/18 0800)  Resp: (!) 22 (12/25/18 0800)  BP: (!) 139/91 (12/25/18 0800)  SpO2: 97 % (12/25/18 0800) Vital Signs (24h Range):  Temp:  [97.8 °F (36.6 °C)-99 °F (37.2 °C)] 98.9 °F (37.2 °C)  Pulse:  [] 91  Resp:  [18-31] 22  SpO2:  [97  %-100 %] 97 %  BP: (106-160)/() 139/91     Weight: 66.1 kg (145 lb 11.6 oz)  Body mass index is 20.91 kg/m².    Intake/Output - Last 3 Shifts       12/23 0700 - 12/24 0659 12/24 0700 - 12/25 0659 12/25 0700 - 12/26 0659    P.O. 570 0     I.V. (mL/kg)  150 (2.3)     Other  300     NG/GT   230    Total Intake(mL/kg) 570 (8.6) 450 (6.8) 230 (3.5)    Urine (mL/kg/hr) 200 (0.1)  125 (0.5)    Other  1600     Stool   0    Total Output 200 1600 125    Net +370 -1150 +105           Urine Occurrence  0 x     Stool Occurrence  0 x 1 x          Physical Exam   Constitutional: He is oriented to person, place, and time. He appears well-developed. No distress.   Temporal and distal extremity muscle wasting noted.   HENT:   Head: Normocephalic and atraumatic.   Mouth/Throat: Oropharynx is clear and moist. No oropharyngeal exudate.   Eyes: EOM are normal.   Neck: Normal range of motion. Neck supple. No JVD present.   Cardiovascular: Normal rate, regular rhythm, normal heart sounds and intact distal pulses.   No murmur heard.  Pulmonary/Chest: Effort normal. No respiratory distress. He has decreased breath sounds in the right lower field and the left lower field. He has no wheezes. He exhibits no tenderness.   Abdominal: Soft. Bowel sounds are normal. He exhibits no distension. There is no tenderness.   Chevron inc clean, dry, intact with steri strips in place     Musculoskeletal: Normal range of motion. He exhibits no edema or tenderness.   Neurological: He is alert and oriented to person, place, and time. He has normal reflexes.   Skin: Skin is warm and dry. Capillary refill takes 2 to 3 seconds. He is not diaphoretic. No pallor.   Psychiatric: He has a normal mood and affect. His behavior is normal. Thought content normal. His affect is not labile. He is not slowed. Cognition and memory are not impaired.   Nursing note and vitals reviewed.      Laboratory:  Immunosuppressants         Stop Route Frequency     tacrolimus  capsule 1.5 mg      -- SL 2 times daily        CBC:   Recent Labs   Lab 12/25/18  0702   WBC 12.11   RBC 3.02*   HGB 8.6*   HCT 28.2*      MCV 93   MCH 28.5   MCHC 30.5*     CMP:   Recent Labs   Lab 12/25/18  0702      CALCIUM 8.9   ALBUMIN 2.0*   PROT 5.8*      K 3.5   CO2 22*      BUN 13   CREATININE 1.7*   ALKPHOS 153*   ALT 8*   AST 10   BILITOT 1.3*     Tacrolimus Lvl   Date Value Ref Range Status   12/25/2018 5.2 5.0 - 15.0 ng/mL Final     Comment:     Testing performed by Liquid Chromatography-Tandem  Mass Spectrometry (LC-MS/MS).  This test was developed and its performance characteristics  determined by [facility], Department of Pathology  and Laboratory Medicine in a manner consistent with CLIA  requirements. It has not been cleared or approved by the [place]  Food and Drug Administration.  This test is used for clinical   purposes.  It should not be regarded as investigational or for  research.     12/24/2018 4.6 (L) 5.0 - 15.0 ng/mL Final     Comment:     Testing performed by Liquid Chromatography-Tandem  Mass Spectrometry (LC-MS/MS).  This test was developed and its performance characteristics  determined by [facility], Department of Pathology  and Laboratory Medicine in a manner consistent with CLIA  requirements. It has not been cleared or approved by the [place]  Food and Drug Administration.  This test is used for clinical   purposes.  It should not be regarded as investigational or for  research.     12/23/2018 5.5 5.0 - 15.0 ng/mL Final     Comment:     Testing performed by Liquid Chromatography-Tandem  Mass Spectrometry (LC-MS/MS).  This test was developed and its performance characteristics  determined by [facility], Department of Pathology  and Laboratory Medicine in a manner consistent with CLIA  requirements. It has not been cleared or approved by the [place]  Food and Drug Administration.  This test is used for clinical   purposes.  It should  not be regarded as investigational or for  research.     12/22/2018 6.4 5.0 - 15.0 ng/mL Final     Comment:     Testing performed by Liquid Chromatography-Tandem  Mass Spectrometry (LC-MS/MS).  This test was developed and its performance characteristics  determined by [facility], Department of Pathology  and Laboratory Medicine in a manner consistent with CLIA  requirements. It has not been cleared or approved by the [place]  Food and Drug Administration.  This test is used for clinical   purposes.  It should not be regarded as investigational or for  research.         Labs within the past 24 hours have been reviewed.    Diagnostic Results:  I have personally reviewed all pertinent imaging studies.    Assessment/Plan:     * S/P liver transplant    - LFTs now improving slowly.  T bili was 37.6 day of transplant.  - Drain placed during take back. Drain placed to intra-abdominal abscess on 11/22.   - Fluid from collection noted with enterococcus VRE completed Zyvox treatment.  - Routine 1 month Liver US 12/17 which showed elevated velocity of hepatic artery and low RIs.   - Vascular Surgery consulted. Recommend repeat US in 10-14 days (12/27-12/31) to reassess. Appreciate Vascular recs.  LFTs stable.     Leukocytosis    - See intra-abdominal abscess.  - wbc improving, cont w delirium.    - Repeat cx 12/4 with NGTD.  Completed Linezolid and Cefepime per ID.    - Wbc trended up 12/11- repeat cx 12/11.  - WBC w/ improvement 12/12  - WBC w/ some improvement today.        Intra-abdominal abscess    - Significant increase in WBC post-op.   - OR cultures from 11/18 noted with enterococcus VRE.   - Abdominal CT performed at that time with fluid collection and drain placed on 11/22 for drainage.   - Intra-abdominal abscess culture also with enterococcus VRE.   - ID consulted and pt placed on antibxs for treatment. Pt was on Cefepime, Daptomycin, and Fluconazole for treatment.    - Pt remains with RLQ drains (one JOSE A and  one Percutaneous).  One JOSE A removed 11/28.  Perc drain in placed draining tan, clear drainage.  WBC slowly improving.   - Liver US on 11/26 reviewed.   - Abdominal CT performed 11/27 and reviewed. Fluid collection noted with improvement on CT.  ID following and reassured by findings.    - Dapto, Cefepime and Flagyl changed 11/30/18 to Linezolid 600 mg PO q 12 hr x 10 days per ID.     - Added back cefepime after thora.  ID re consulted.  - Completed Zyvox x 10 days per ID thru 12/9/18. Monitor.        Weakness    - Pt remains significantly debilitated.   - PT/OT for strengthening.   - Currently will need SNF for placement and further rehabilitation.   - Pt remains severely debilitated and not strong enough for SNF at this time.    - Cont with strengthening  - SNF consult placed 12/17. However, denied for SNF again as not strong enough at this time  - Rehab consulted, per MSW- ins. will not cover rehab.  Pt is getting stronger.      Acute renal failure with acute tubular necrosis superimposed on stage 3 chronic kidney disease    - 2 weeks for DEL prior to transplant  - Renal function slow to recover post-op.   - Nephrology consulted for management.   - Cont with HD as per nephrology recs.  Apprec recs.    - Lasix 160 mg challenge 11/28 w/ minimal output.    - HD MWF.   - Strict I&Os.      Anemia of chronic disease    - H&H stable. Monitor with daily labs.   - Chest/Abd/pelvis CT 12/4- no fluid to be drainged per transplant surgeon.  No source of bleeding.     - Last liver US 11/27. Evolving peritransplant hematomas with anechoic fluid collection adjacent to the right hepatic lobe.  Small volume perihepatic free fluid.     At risk for opportunistic infections    - Cont with bactrim and valcyte for protection against opportunistic infections.   - Cont Isavuconazonium for fungal prophylaxis.     Long-term use of immunosuppressant medication    - Cont with prograf. Draw prograf level daily and adjust dose as needed to  "maintain a therapeutic level.        Prophylactic immunotherapy    - See long term immunosuppression.        Type 2 diabetes mellitus without complication    - Endocrine consulted for management. Appreciate recs.   - Hypoglycemia 12/10 requiring D50.    - Repeat cx 12/11 - no growth       Chest pain    - Pt c/o "burning" diffuse CP 12/21 AM  - EKG NSR, stable from prior  - Troponin wnl  - Likely GI related pain.        Hypophosphatemia    - Continue to monitor.         ATN (acute tubular necrosis)    - Cont Dialysis MWF.  - Nephrology following, appreciate recs.  - Monitor.     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       Hypomagnesemia    - Continue to monitor & replace as needed.     Severe malnutrition    - Dietician following. Severe temporal, clavicle muscle depletion noted. Severe subq fat loss  - Nutrition has been poor over the past 24 hours.   - Discussed at length with patient and caregivers that if patient's nutrition status does not improve, will need supplemental nutrition via tube feeds or TPN.  - Appetite stimulant started 12/19.   - Caregivers to bring in outside food for patient.   - Continue calorie count.  - EGD Monday 12/24 as pt c/o poor appetite + burning in chest/esophagus and occasionally vomiting after eating x several weeks.   - EGD 12/24 unremarkable. prabhakar tube placed afterwards.   - TF started for nutrition.      Anxiety    - Restarted Lexapro as per psych recs, 12/13.   - Monitor.     Prolonged Q-T interval on ECG    -  ms on EKG 12/19.  -  on EKG 12/21.  - Monitor.       Alcohol use disorder, severe, in early remission, dependence    - Monitor.       Decreased appetite    - Continue appetite stimulant.  - GI consulted as pt c/o burning sensation in chest/esophagus + vomiting after eating, passed SLP eval, recommended GI f/u.  - Cont to encourage PO intake. Ordered additional supplements.   - EGD , 12/24 unremarkable. " prabhakar tube placed and TF started. Plan for prealbumin in am.      Adrenal cortical steroids causing adverse effect in therapeutic use    - Monitor.       Pleural effusion associated with hepatic disorder    - c/o increased pain w deep breath today to right side.  CXR showed increased opacity in the inferior hemothorax on the right side since 11/26/2018.  Pulse ox 97-99% on RA.  Cont to monitor.   - continues to have fluid to RLL.  WBC remains elevated.    - thoracentesis performed on 11/30. Fluid noted with 4k WBC, 93 SEGS. cx no growth to date.  Cefepime added for treatment.  - Chest CT showing right pleural effusion improved, left w increased pleural effusion.   - Per ID, completed treatment of empyema w Cefepime thru 12/8.  - sats remain 97-99% on RA.  - CXR 12/20 showed left hemidiaphragmatic margin that is better delineated than on 12/19/2018, consistent with improved aeration in the left lower lobe. + no significant detrimental interval change in the appearance of the chest, with the current examination demonstrating a slightly improved inspiratory depth level.    - denies SOB.  Last CXR 12/22 w improvement.         VTE Risk Mitigation (From admission, onward)        Ordered     heparin (porcine) injection 5,000 Units  Every 8 hours      12/24/18 0742     heparin (porcine) injection 1,000 Units  As needed (PRN)      12/12/18 1731     IP VTE HIGH RISK PATIENT  Once      11/11/18 1208          The patients clinical status was discussed at multidisplinary rounds, involving transplant surgery, transplant medicine, pharmacy, nursing, nutrition, and social work    Discharge Planning:  No Patient Care Coordination Note on file.      Kevin Miranda NP  Liver Transplant  [facility]

## 2018-12-25 NOTE — SUBJECTIVE & OBJECTIVE
Scheduled Meds:   albumin human 25%  25 g Intravenous Q8H    aspirin  81 mg Oral Daily    epoetin sherlyn (PROCRIT) injection  50 Units/kg (Dosing Weight) Intravenous Every Mon, Wed, Fri    ergocalciferol  50,000 Units Oral Q7 Days    escitalopram oxalate  20 mg Oral Daily    heparin (porcine)  5,000 Units Subcutaneous Q8H    isavuconazonium sulfate  372 mg Oral Daily    lidocaine  1 patch Transdermal Q24H    metoprolol  12.5 mg Per NG tube BID    mirtazapine  7.5 mg Oral QHS    pantoprazole  40 mg Intravenous BID    sulfamethoxazole-trimethoprim 400-80mg  1 tablet Oral Every Mon, Wed, Fri    tacrolimus  1.5 mg Sublingual BID    ursodiol  300 mg Oral BID    valganciclovir 50 mg/ml  100 mg Oral Once per day on Mon Wed Fri     Continuous Infusions:  PRN Meds:sodium chloride 0.9%, acetaminophen, albuterol-ipratropium, artificial tears, bacitracin, bisacodyl, dextrose 50%, dextrose 50%, glucagon (human recombinant), glucose, glucose, heparin (porcine), hydrALAZINE, midodrine, ondansetron, ramelteon, tiZANidine, traMADol, traMADol    Review of Systems   Constitutional: Positive for activity change and appetite change (decreased). Negative for fatigue and fever.   HENT: Negative for congestion, facial swelling, sore throat and voice change.    Eyes: Negative for pain, discharge and visual disturbance.   Respiratory: Negative for apnea, cough, choking, chest tightness, shortness of breath and wheezing.    Cardiovascular: Negative for chest pain, palpitations and leg swelling.   Gastrointestinal: Positive for nausea. Negative for abdominal distention, abdominal pain, constipation, diarrhea and vomiting.   Endocrine: Negative.    Genitourinary: Positive for decreased urine volume. Negative for difficulty urinating, dysuria, hematuria and urgency.   Musculoskeletal: Negative.  Negative for back pain, gait problem, neck pain and neck stiffness.   Skin: Positive for wound. Negative for color change and pallor.    Allergic/Immunologic: Positive for immunocompromised state.   Neurological: Positive for weakness. Negative for dizziness, seizures, light-headedness and headaches.   Psychiatric/Behavioral: Negative for behavioral problems, confusion, decreased concentration, dysphoric mood, hallucinations, sleep disturbance and suicidal ideas. The patient is not nervous/anxious.      Objective:     Vital Signs (Most Recent):  Temp: 98.9 °F (37.2 °C) (12/25/18 0800)  Pulse: 91 (12/25/18 0800)  Resp: (!) 22 (12/25/18 0800)  BP: (!) 139/91 (12/25/18 0800)  SpO2: 97 % (12/25/18 0800) Vital Signs (24h Range):  Temp:  [97.8 °F (36.6 °C)-99 °F (37.2 °C)] 98.9 °F (37.2 °C)  Pulse:  [] 91  Resp:  [18-31] 22  SpO2:  [97 %-100 %] 97 %  BP: (106-160)/() 139/91     Weight: 66.1 kg (145 lb 11.6 oz)  Body mass index is 20.91 kg/m².    Intake/Output - Last 3 Shifts       12/23 0700 - 12/24 0659 12/24 0700 - 12/25 0659 12/25 0700 - 12/26 0659    P.O. 570 0     I.V. (mL/kg)  150 (2.3)     Other  300     NG/GT   230    Total Intake(mL/kg) 570 (8.6) 450 (6.8) 230 (3.5)    Urine (mL/kg/hr) 200 (0.1)  125 (0.5)    Other  1600     Stool   0    Total Output 200 1600 125    Net +370 -1150 +105           Urine Occurrence  0 x     Stool Occurrence  0 x 1 x          Physical Exam   Constitutional: He is oriented to person, place, and time. He appears well-developed. No distress.   Temporal and distal extremity muscle wasting noted.   HENT:   Head: Normocephalic and atraumatic.   Mouth/Throat: Oropharynx is clear and moist. No oropharyngeal exudate.   Eyes: EOM are normal.   Neck: Normal range of motion. Neck supple. No JVD present.   Cardiovascular: Normal rate, regular rhythm, normal heart sounds and intact distal pulses.   No murmur heard.  Pulmonary/Chest: Effort normal. No respiratory distress. He has decreased breath sounds in the right lower field and the left lower field. He has no wheezes. He exhibits no tenderness.   Abdominal: Soft.  Bowel sounds are normal. He exhibits no distension. There is no tenderness.   Chevron inc clean, dry, intact with steri strips in place     Musculoskeletal: Normal range of motion. He exhibits no edema or tenderness.   Neurological: He is alert and oriented to person, place, and time. He has normal reflexes.   Skin: Skin is warm and dry. Capillary refill takes 2 to 3 seconds. He is not diaphoretic. No pallor.   Psychiatric: He has a normal mood and affect. His behavior is normal. Thought content normal. His affect is not labile. He is not slowed. Cognition and memory are not impaired.   Nursing note and vitals reviewed.      Laboratory:  Immunosuppressants         Stop Route Frequency     tacrolimus capsule 1.5 mg      -- SL 2 times daily        CBC:   Recent Labs   Lab 12/25/18  0702   WBC 12.11   RBC 3.02*   HGB 8.6*   HCT 28.2*      MCV 93   MCH 28.5   MCHC 30.5*     CMP:   Recent Labs   Lab 12/25/18  0702      CALCIUM 8.9   ALBUMIN 2.0*   PROT 5.8*      K 3.5   CO2 22*      BUN 13   CREATININE 1.7*   ALKPHOS 153*   ALT 8*   AST 10   BILITOT 1.3*     Tacrolimus Lvl   Date Value Ref Range Status   12/25/2018 5.2 5.0 - 15.0 ng/mL Final     Comment:     Testing performed by Liquid Chromatography-Tandem  Mass Spectrometry (LC-MS/MS).  This test was developed and its performance characteristics  determined by [facility], Department of Pathology  and Laboratory Medicine in a manner consistent with CLIA  requirements. It has not been cleared or approved by the [place]  Food and Drug Administration.  This test is used for clinical   purposes.  It should not be regarded as investigational or for  research.     12/24/2018 4.6 (L) 5.0 - 15.0 ng/mL Final     Comment:     Testing performed by Liquid Chromatography-Tandem  Mass Spectrometry (LC-MS/MS).  This test was developed and its performance characteristics  determined by [facility], Department of Pathology  and Laboratory  Medicine in a manner consistent with CLIA  requirements. It has not been cleared or approved by the [place]  Food and Drug Administration.  This test is used for clinical   purposes.  It should not be regarded as investigational or for  research.     12/23/2018 5.5 5.0 - 15.0 ng/mL Final     Comment:     Testing performed by Liquid Chromatography-Tandem  Mass Spectrometry (LC-MS/MS).  This test was developed and its performance characteristics  determined by [facility], Department of Pathology  and Laboratory Medicine in a manner consistent with CLIA  requirements. It has not been cleared or approved by the [place]  Food and Drug Administration.  This test is used for clinical   purposes.  It should not be regarded as investigational or for  research.     12/22/2018 6.4 5.0 - 15.0 ng/mL Final     Comment:     Testing performed by Liquid Chromatography-Tandem  Mass Spectrometry (LC-MS/MS).  This test was developed and its performance characteristics  determined by [facility], Department of Pathology  and Laboratory Medicine in a manner consistent with CLIA  requirements. It has not been cleared or approved by the [place]  Food and Drug Administration.  This test is used for clinical   purposes.  It should not be regarded as investigational or for  research.         Labs within the past 24 hours have been reviewed.    Diagnostic Results:  I have personally reviewed all pertinent imaging studies.

## 2018-12-25 NOTE — PROGRESS NOTES
Tx complete. Removed 1L. Rt. Chest CVC flushed, heparinized, capped and secured. Dressing changed. Aseptic technique maintained. AAO x 4. NAD noted.

## 2018-12-26 LAB
ABO + RH BLD: NORMAL
ALBUMIN SERPL BCP-MCNC: 2.9 G/DL
ALP SERPL-CCNC: 131 U/L
ALT SERPL W/O P-5'-P-CCNC: 7 U/L
ANION GAP SERPL CALC-SCNC: 9 MMOL/L
AST SERPL-CCNC: 8 U/L
BACTERIA UR CULT: NORMAL
BASOPHILS # BLD AUTO: 0.15 K/UL
BASOPHILS NFR BLD: 1.4 %
BILIRUB SERPL-MCNC: 1.1 MG/DL
BLD GP AB SCN CELLS X3 SERPL QL: NORMAL
BLD PROD TYP BPU: NORMAL
BLOOD UNIT EXPIRATION DATE: NORMAL
BLOOD UNIT TYPE CODE: 5100
BLOOD UNIT TYPE: NORMAL
BUN SERPL-MCNC: 19 MG/DL
CALCIUM SERPL-MCNC: 9 MG/DL
CHLORIDE SERPL-SCNC: 106 MMOL/L
CO2 SERPL-SCNC: 25 MMOL/L
CODING SYSTEM: NORMAL
CREAT SERPL-MCNC: 1.8 MG/DL
DIFFERENTIAL METHOD: ABNORMAL
DISPENSE STATUS: NORMAL
EOSINOPHIL # BLD AUTO: 0.7 K/UL
EOSINOPHIL NFR BLD: 6.3 %
ERYTHROCYTE [DISTWIDTH] IN BLOOD BY AUTOMATED COUNT: 15.8 %
EST. GFR  (AFRICAN AMERICAN): 48.6 ML/MIN/1.73 M^2
EST. GFR  (NON AFRICAN AMERICAN): 42 ML/MIN/1.73 M^2
GLUCOSE SERPL-MCNC: 162 MG/DL
HCT VFR BLD AUTO: 24.4 %
HGB BLD-MCNC: 7.5 G/DL
IMM GRANULOCYTES # BLD AUTO: 0.12 K/UL
IMM GRANULOCYTES NFR BLD AUTO: 1.1 %
LYMPHOCYTES # BLD AUTO: 0.6 K/UL
LYMPHOCYTES NFR BLD: 6 %
MAGNESIUM SERPL-MCNC: 1.7 MG/DL
MCH RBC QN AUTO: 27.9 PG
MCHC RBC AUTO-ENTMCNC: 30.7 G/DL
MCV RBC AUTO: 91 FL
MONOCYTES # BLD AUTO: 1.4 K/UL
MONOCYTES NFR BLD: 13.2 %
NEUTROPHILS # BLD AUTO: 7.6 K/UL
NEUTROPHILS NFR BLD: 72 %
NRBC BLD-RTO: 0 /100 WBC
PHOSPHATE SERPL-MCNC: 1.7 MG/DL
PLATELET # BLD AUTO: 217 K/UL
PMV BLD AUTO: 11.6 FL
POCT GLUCOSE: 135 MG/DL (ref 70–110)
POCT GLUCOSE: 145 MG/DL (ref 70–110)
POCT GLUCOSE: 160 MG/DL (ref 70–110)
POCT GLUCOSE: 173 MG/DL (ref 70–110)
POTASSIUM SERPL-SCNC: 3.1 MMOL/L
PREALB SERPL-MCNC: 7 MG/DL
PROT SERPL-MCNC: 6.1 G/DL
RBC # BLD AUTO: 2.69 M/UL
SODIUM SERPL-SCNC: 140 MMOL/L
TACROLIMUS BLD-MCNC: 3.9 NG/ML
TRANS ERYTHROCYTES VOL PATIENT: NORMAL ML
WBC # BLD AUTO: 10.55 K/UL

## 2018-12-26 PROCEDURE — 36415 COLL VENOUS BLD VENIPUNCTURE: CPT

## 2018-12-26 PROCEDURE — 20600001 HC STEP DOWN PRIVATE ROOM

## 2018-12-26 PROCEDURE — 63600175 PHARM REV CODE 636 W HCPCS: Performed by: NURSE PRACTITIONER

## 2018-12-26 PROCEDURE — 25000003 PHARM REV CODE 250: Performed by: PHYSICIAN ASSISTANT

## 2018-12-26 PROCEDURE — 63600175 PHARM REV CODE 636 W HCPCS: Mod: JG | Performed by: INTERNAL MEDICINE

## 2018-12-26 PROCEDURE — 25000003 PHARM REV CODE 250: Performed by: INTERNAL MEDICINE

## 2018-12-26 PROCEDURE — 83735 ASSAY OF MAGNESIUM: CPT

## 2018-12-26 PROCEDURE — 94799 UNLISTED PULMONARY SVC/PX: CPT

## 2018-12-26 PROCEDURE — 90935 HEMODIALYSIS ONE EVALUATION: CPT

## 2018-12-26 PROCEDURE — 80053 COMPREHEN METABOLIC PANEL: CPT

## 2018-12-26 PROCEDURE — 99233 PR SUBSEQUENT HOSPITAL CARE,LEVL III: ICD-10-PCS | Mod: 24,,, | Performed by: NURSE PRACTITIONER

## 2018-12-26 PROCEDURE — 99233 SBSQ HOSP IP/OBS HIGH 50: CPT | Mod: ,,, | Performed by: NURSE PRACTITIONER

## 2018-12-26 PROCEDURE — 80197 ASSAY OF TACROLIMUS: CPT

## 2018-12-26 PROCEDURE — 94761 N-INVAS EAR/PLS OXIMETRY MLT: CPT

## 2018-12-26 PROCEDURE — 25000003 PHARM REV CODE 250: Performed by: NURSE PRACTITIONER

## 2018-12-26 PROCEDURE — 85025 COMPLETE CBC W/AUTO DIFF WBC: CPT

## 2018-12-26 PROCEDURE — 27000646 HC AEROBIKA DEVICE

## 2018-12-26 PROCEDURE — 84134 ASSAY OF PREALBUMIN: CPT

## 2018-12-26 PROCEDURE — 99233 PR SUBSEQUENT HOSPITAL CARE,LEVL III: ICD-10-PCS | Mod: ,,, | Performed by: NURSE PRACTITIONER

## 2018-12-26 PROCEDURE — 94664 DEMO&/EVAL PT USE INHALER: CPT

## 2018-12-26 PROCEDURE — P9021 RED BLOOD CELLS UNIT: HCPCS

## 2018-12-26 PROCEDURE — 63600175 PHARM REV CODE 636 W HCPCS: Mod: JG | Performed by: NURSE PRACTITIONER

## 2018-12-26 PROCEDURE — 36430 TRANSFUSION BLD/BLD COMPNT: CPT

## 2018-12-26 PROCEDURE — 25000003 PHARM REV CODE 250: Performed by: STUDENT IN AN ORGANIZED HEALTH CARE EDUCATION/TRAINING PROGRAM

## 2018-12-26 PROCEDURE — P9047 ALBUMIN (HUMAN), 25%, 50ML: HCPCS | Mod: JG | Performed by: NURSE PRACTITIONER

## 2018-12-26 PROCEDURE — C9113 INJ PANTOPRAZOLE SODIUM, VIA: HCPCS | Performed by: NURSE PRACTITIONER

## 2018-12-26 PROCEDURE — 84100 ASSAY OF PHOSPHORUS: CPT

## 2018-12-26 PROCEDURE — 99233 SBSQ HOSP IP/OBS HIGH 50: CPT | Mod: 24,,, | Performed by: NURSE PRACTITIONER

## 2018-12-26 PROCEDURE — 86920 COMPATIBILITY TEST SPIN: CPT

## 2018-12-26 PROCEDURE — 99900035 HC TECH TIME PER 15 MIN (STAT)

## 2018-12-26 PROCEDURE — 86901 BLOOD TYPING SEROLOGIC RH(D): CPT

## 2018-12-26 PROCEDURE — 25000003 PHARM REV CODE 250: Performed by: SURGERY

## 2018-12-26 PROCEDURE — 63600175 PHARM REV CODE 636 W HCPCS: Performed by: SURGERY

## 2018-12-26 RX ORDER — TACROLIMUS 1 MG/1
2 CAPSULE ORAL 2 TIMES DAILY
Status: DISCONTINUED | OUTPATIENT
Start: 2018-12-26 | End: 2018-12-28

## 2018-12-26 RX ORDER — HYDROCODONE BITARTRATE AND ACETAMINOPHEN 500; 5 MG/1; MG/1
TABLET ORAL
Status: DISCONTINUED | OUTPATIENT
Start: 2018-12-26 | End: 2018-12-28

## 2018-12-26 RX ADMIN — SULFAMETHOXAZOLE AND TRIMETHOPRIM 1 TABLET: 400; 80 TABLET ORAL at 08:12

## 2018-12-26 RX ADMIN — Medication 12.5 MG: at 08:12

## 2018-12-26 RX ADMIN — ESCITALOPRAM OXALATE 20 MG: 5 SOLUTION ORAL at 05:12

## 2018-12-26 RX ADMIN — ALBUMIN HUMAN 25 G: 0.25 SOLUTION INTRAVENOUS at 05:12

## 2018-12-26 RX ADMIN — TRAMADOL HYDROCHLORIDE 50 MG: 50 TABLET, FILM COATED ORAL at 01:12

## 2018-12-26 RX ADMIN — TACROLIMUS 1.5 MG: 1 CAPSULE ORAL at 06:12

## 2018-12-26 RX ADMIN — PANTOPRAZOLE SODIUM 40 MG: 40 INJECTION, POWDER, LYOPHILIZED, FOR SOLUTION INTRAVENOUS at 01:12

## 2018-12-26 RX ADMIN — TACROLIMUS 2 MG: 1 CAPSULE ORAL at 05:12

## 2018-12-26 RX ADMIN — HEPARIN SODIUM 5000 UNITS: 5000 INJECTION, SOLUTION INTRAVENOUS; SUBCUTANEOUS at 05:12

## 2018-12-26 RX ADMIN — LIDOCAINE 1 PATCH: 50 PATCH CUTANEOUS at 05:12

## 2018-12-26 RX ADMIN — MORPHINE 100 MG: 10 SOLUTION ORAL at 08:12

## 2018-12-26 RX ADMIN — URSODIOL 300 MG: 300 CAPSULE ORAL at 01:12

## 2018-12-26 RX ADMIN — Medication 12.5 MG: at 01:12

## 2018-12-26 RX ADMIN — ISAVUCONAZONIUM SULFATE 372 MG: 186 CAPSULE ORAL at 01:12

## 2018-12-26 RX ADMIN — SODIUM CHLORIDE 300 ML: 0.9 INJECTION, SOLUTION INTRAVENOUS at 09:12

## 2018-12-26 RX ADMIN — PANTOPRAZOLE SODIUM 40 MG: 40 INJECTION, POWDER, LYOPHILIZED, FOR SOLUTION INTRAVENOUS at 08:12

## 2018-12-26 RX ADMIN — ASPIRIN 81 MG CHEWABLE TABLET 81 MG: 81 TABLET CHEWABLE at 01:12

## 2018-12-26 RX ADMIN — ONDANSETRON 4 MG: 2 INJECTION INTRAMUSCULAR; INTRAVENOUS at 01:12

## 2018-12-26 RX ADMIN — HEPARIN SODIUM 1000 UNITS: 1000 INJECTION, SOLUTION INTRAVENOUS; SUBCUTANEOUS at 09:12

## 2018-12-26 RX ADMIN — HEPARIN SODIUM 5000 UNITS: 5000 INJECTION, SOLUTION INTRAVENOUS; SUBCUTANEOUS at 01:12

## 2018-12-26 RX ADMIN — URSODIOL 300 MG: 300 CAPSULE ORAL at 08:12

## 2018-12-26 RX ADMIN — MIRTAZAPINE 7.5 MG: 7.5 TABLET ORAL at 08:12

## 2018-12-26 RX ADMIN — HEPARIN SODIUM 5000 UNITS: 5000 INJECTION, SOLUTION INTRAVENOUS; SUBCUTANEOUS at 08:12

## 2018-12-26 RX ADMIN — ERYTHROPOIETIN 4200 UNITS: 10000 INJECTION, SOLUTION INTRAVENOUS; SUBCUTANEOUS at 09:12

## 2018-12-26 NOTE — ASSESSMENT & PLAN NOTE
"- Pt c/o "burning" diffuse CP 12/21 AM.  - EKG NSR, stable from prior.  - Troponin wnl.  - Likely GI related pain.     "

## 2018-12-26 NOTE — PROGRESS NOTES
VITALIY presented to Case Management to confirm patients benefits.  PT does not have SNF coverage but his insurance [facility] has a new contract with [facility]; however, the patient must meet requirements. VITALIY remains available. VITALIY to notify LTS.

## 2018-12-26 NOTE — ASSESSMENT & PLAN NOTE
- H&H 7.5/24.4. Transfuse 1 unit PRBCs with HD today. Monitor with daily labs.   - Chest/Abd/pelvis CT 12/4- no fluid to be drainged per transplant surgeon.  No source of bleeding.     - Last liver US 11/27. Evolving peritransplant hematomas with anechoic fluid collection adjacent to the right hepatic lobe.  Small volume perihepatic free fluid.

## 2018-12-26 NOTE — ASSESSMENT & PLAN NOTE
- 2 weeks for DEL prior to transplant.  - Renal function slow to recover post-op.   - Nephrology consulted for management.   - Cont with HD as per nephrology recs.  Apprec recs.    - Lasix 160 mg challenge 11/28 w/ minimal output.    - HD MWF. Tolerated well 12/26.  - Strict I&Os.

## 2018-12-26 NOTE — PROGRESS NOTES
[facility] [address]  Adult Nutrition  Progress Note     SUMMARY       Recommendations  Recommendation/Intervention:   1. Continue TF @goal, meeting 100% EEN/EPN   2. D/C Boost Pudding due to not on hospital formulary.   3. D/C Boost Plus- patient not consuming   4. Change Boost Breeze to Orange Flavor only   5. Encourage good po intake    Dietitian Following     Patient with Severe Malnutrition in the context ofChronic Illness/Injury.    Goals: Patient to meet >85% of EEN/EPN  Nutrition Goal Status: goal met  Communication of RD Recs: (POC)    Reason for Assessment  Reason For Assessment: RD follow-up  Diagnosis: transplant/postoperative complications(s/p OLTx 11/11)  Relevant Medical History: ESLD 2/2 alcoholic cirrhosis, ESRD on HD, DM2  Interdisciplinary Rounds: attended  General Information Comments: Patient in HD during visit. Spoke with caregiver. Patient tolerating TF. Per caregiver pt not consuming po tray. Noted Boost Plus and Boost Breeze ordered in Epic. Observed only Boost Plus on tray, per caregiver patient is not drinking the Boost Plus due to tired of drinking them. Stated she doesn't believe they have ever received the Boost Breeze but thinks he would be open to trying one (orange). Fixed supplements in mydining. Noted 12/25 prabhakar tube placed for TF. Running at goal, tolerating well. Noted HD 12/24/18, 1.6L off. NFPE completed 12/14/18. Patient with Severe Malnutrition in the context ofChronic Illness/Injury.  Nutrition Discharge Planning: Adequate nutrition    Nutrition Risk Screen  Nutrition Risk Screen: tube feeding or parenteral nutrition    Nutrition/Diet History  Patient Reported Diet/Restrictions/Preferences: low salt, general  Food Preferences: noted  Spiritual, Cultural Beliefs, Uatsdin Practices, Values that Affect Care: no  Food Allergies: NKFA  Factors Affecting Nutritional Intake: decreased appetite, behavioral (mealtime)    Anthropometrics  Temp: 98.1 °F (36.7 °C)  Height  "Method: Stated  Height: 5' 10" (177.8 cm)  Height (inches): 70 in  Weight Method: Bed Scale  Weight: 66.5 kg (146 lb 9.7 oz)  Weight (lb): 146.61 lb  Ideal Body Weight (IBW), Male: 166 lb  % Ideal Body Weight, Male (lb): 119.13 lb  BMI (Calculated): 28.4  BMI Grade: 25 - 29.9 - overweight     Lab/Procedures/Meds  Labs: Reviewed    12/26/2018    K 3.1 (L) 12/26/2018     12/26/2018    CO2 25 12/26/2018    BUN 19 12/26/2018    CREATININE 1.8 (H) 12/26/2018    CALCIUM 9.0 12/26/2018    PHOS 1.7 (L) 12/26/2018    MG 1.7 12/26/2018    ESTGFRAFRICA 48.6 (A) 12/26/2018    EGFRNONAA 42.0 (A) 12/26/2018    ALBUMIN 2.9 (L) 12/26/2018      ALT 7 (L) 12/26/2018    AST 8 (L) 12/26/2018    ALKPHOS 131 12/26/2018     Meds: Reviewed  Scheduled Meds:   sodium chloride 0.9%   Intravenous Once    aspirin  81 mg Oral Daily    epoetin sherlyn (PROCRIT) injection  50 Units/kg (Dosing Weight) Intravenous Every Mon, Wed, Fri    ergocalciferol  50,000 Units Oral Q7 Days    escitalopram oxalate  20 mg Oral Daily    heparin (porcine)  5,000 Units Subcutaneous Q8H    isavuconazonium sulfate  372 mg Oral Daily    lidocaine  1 patch Transdermal Q24H    metoprolol  12.5 mg Per NG tube BID    mirtazapine  7.5 mg Oral QHS    pantoprazole  40 mg Intravenous BID    sulfamethoxazole-trimethoprim 400-80mg  1 tablet Oral Every Mon, Wed, Fri    tacrolimus  1.5 mg Sublingual BID    ursodiol  300 mg Oral BID    valganciclovir 50 mg/ml  100 mg Oral Once per day on Mon Wed Fri     Estimated/Assessed Needs  Weight Used For Calorie Calculations: 66.5 kg (146 lb 9.7 oz)  Energy Calorie Requirements (kcal): 1970(PAL 1.3)  Energy Need Method: [facility]  Protein Requirements: 79-99(1.2-1.5 gm/kg)  Weight Used For Protein Calculations: 66.5 kg (146 lb 9.7 oz)  Fluid Requirements (mL): 1 mL/kcal or per MD  Estimated Fluid Requirement Method: RDA Method  RDA Method (mL): 1970     Nutrition Prescription Ordered  Current Diet Order: " Regular  Current Nutrition Support Formula Ordered: Novasource Renal  Current Nutrition Support Rate Ordered: 40 (ml)  Current Nutrition Support Frequency Ordered: 40 mL/hr x 24hrs  Oral Nutrition Supplement: Boost Plus, Boost Breeze    Evaluation of Received Nutrient/Fluid Intake in last 24h  Enteral Calories (kcal): 1920  Enteral Protein (gm): 87  Enteral (Free Water) Fluid (mL): 688  % Protein Needs: 100  I/O: -5.2L since 12/12/18  Energy Calories Required: meeting needs  Protein Required: meeting needs  Comments: LBM 12/25/18  Tolerance: (tolerating)  % Intake of Estimated Energy Needs: 75 - 100 %  % Meal Intake: 0 - 25 %    Nutrition Risk  Level of Risk/Frequency of Follow-up: low(1x week)     Assessment and Plan  NFPE 12/14/18  Malnutrition in the context of Chronic Illness/Injury     Related to (etiology):  Cirrhosis s/p OLTx 11/11 and poor appetite      Signs and Symptoms (as evidenced by):  Energy Intake: <75% of estimated energy requirement for 2 months  Body Fat Depletion: severe overall subcutaneous fat loss and depletion of orbital area, triceps as well as protruding patellas, ribs and acromion process  Muscle Mass Depletion: severe scapular, temporal, calf, quadricep muscle wasting   Weight Loss: 16.5% x 2 months     Nutrition Diagnosis Status:   Continues    Monitor and Evaluation  Food and Nutrient Intake: energy intake, food and beverage intake  Food and Nutrient Adminstration: diet order  Knowledge/Beliefs/Attitudes: food and nutrition knowledge/skill  Physical Activity and Function: factors affecting access to physical activity  Anthropometric Measurements: weight, weight change, body mass index  Biochemical Data, Medical Tests and Procedures: electrolyte and renal panel, gastrointestinal profile, glucose/endocrine profile, inflammatory profile, lipid profile  Nutrition-Focused Physical Findings: overall appearance, extremities, muscles and bones, head and eyes, skin     Nutrition Follow-Up  RD  Follow-up?: Yes

## 2018-12-26 NOTE — PROGRESS NOTES
[facility]  Liver Transplant  Progress Note    Patient Name: Femi Enciso  MRN: [id number]  Admission Date: 10/27/2018  Hospital Length of Stay: 60 days  Code Status: Full Code  Primary Care Provider: Primary Doctor No  Post-Operative Day: 45    ORGAN:   LIVER  Disease Etiology: Alcoholic Cirrhosis  Donor Type:    - Brain Death  CDC High Risk:   No  Donor CMV Status:   Donor CMV Status: Positive  Donor HBcAB:   Negative  Donor HCV Status:   Negative  Donor HBV JAVIER: Negative  Donor HCV JAVIER: Negative  Whole or Partial: Whole Liver  Biliary Anastomosis: End to End  Arterial Anatomy: Standard  Subjective:     History of Present Illness:  Femi Enciso is a 53yr old male with PMH of acute ETOH hepatitis and DEL/ATN evolved to ESRD requiring HD (not candidate for KTX). He is now s/p liver transplant (SM induction, DBD, CMV D+/R-). Transplant surgery noted severe collateralization, adrenal gland firmly adhered to liver. Bare area of adrenal gland required several stitches and packing to obtain fair hemostasis (EBL 15L). OR take back  for bleeding from R adrenal bed in AM (significant clot in retroperitoneum, posterior to R hepatic lobe, tract posterior to the R kidney containing significant clot, small bleeding area of retroperitoneal fat) then returned to surgery same day for hemorrhaging closed with wound vac with plans to return to OR 24-48 hours for closure (retroperitoneal /retrorenal/retrocolic hematoma on the right ). Raw retroperitoneal bleeding. Suture ligatures placed, argon beam cautery, evarest placed in retroperitoneum behind cava and right kidney. Significant oozing from upper right adrenal gland, not amenable to ligation, that portion of the adrenal gland was resected with cautery). OR take back  for washout and incisional closure, no significant bleeding or hematoma found. OR cultures   from body fluid grew enterococcus faecium VRE. ID was consulted,  started on  daptomycin for VRE treatment and cefepime/flagyl to cover for IA ty in bile. Patient with significant leukocytosis with peak on 11/22 at 48K prompting drainage of R subphrenic fluid collection, perc drain placed, culture grew VRE. Stroke code called 11/21 for lethargy and unresponsive, CT head without evidence of acute ischemic changes and CTA chest negative, vascular neurology was consulted recommended MRI brain, but patient's AMS improved shortly after event. Nephrology consulted for ESRD requiring HD. Patient overall improved on antibiotic regimen, leukocytosis and AMS improved. He was transferred to TSU on POD #15.     Hospital Course:  Pt c/o CP 12/21. EKG stable from prior. Troponin wnl. CP resolved 12/22. CXR 12/20 showed no detrimental change. Pt hypertensive prior to HD.  Dialysis tolerated well 12/21 with 1.5L taken off. Bps much improved after dialysis, but monitoring closely.    Interval History:  No acute events overnight. Pt tolerated HD well today. H&H decreased. 1 unit PRBCs transfused with HD. LFTs stable. Quentin tube in place with tube feedings at goal rate. Prealbumin 7, 12/26. Continue TFs. Pt participating with PT/OT. Will consult rehab when appropriate for discharge. Monitor.     Scheduled Meds:   aspirin  81 mg Oral Daily    epoetin sherlyn (PROCRIT) injection  50 Units/kg (Dosing Weight) Intravenous Every Mon, Wed, Fri    ergocalciferol  50,000 Units Oral Q7 Days    escitalopram oxalate  20 mg Oral Daily    heparin (porcine)  5,000 Units Subcutaneous Q8H    isavuconazonium sulfate  372 mg Oral Daily    lidocaine  1 patch Transdermal Q24H    metoprolol  12.5 mg Per NG tube BID    mirtazapine  7.5 mg Oral QHS    pantoprazole  40 mg Intravenous BID    sulfamethoxazole-trimethoprim 400-80mg  1 tablet Oral Every Mon, Wed, Fri    tacrolimus  2 mg Sublingual BID    ursodiol  300 mg Oral BID    valganciclovir 50 mg/ml  100 mg Oral Once per day on Mon Wed Fri     Continuous Infusions:  PRN  Meds:sodium chloride, sodium chloride 0.9%, acetaminophen, albuterol-ipratropium, artificial tears, bacitracin, bisacodyl, dextrose 50%, dextrose 50%, glucagon (human recombinant), glucose, glucose, heparin (porcine), hydrALAZINE, midodrine, ondansetron, ramelteon, tiZANidine, traMADol    Review of Systems   Constitutional: Positive for activity change and appetite change (decreased). Negative for fatigue and fever.   HENT: Negative for congestion, facial swelling, sore throat and voice change.    Eyes: Negative for pain, discharge and visual disturbance.   Respiratory: Negative for apnea, cough, choking, chest tightness, shortness of breath and wheezing.    Cardiovascular: Negative for chest pain, palpitations and leg swelling.   Gastrointestinal: Positive for nausea. Negative for abdominal distention, abdominal pain, constipation, diarrhea and vomiting.   Endocrine: Negative.    Genitourinary: Positive for decreased urine volume. Negative for difficulty urinating, dysuria, hematuria and urgency.   Musculoskeletal: Negative.  Negative for back pain, gait problem, neck pain and neck stiffness.   Skin: Positive for wound. Negative for color change and pallor.   Allergic/Immunologic: Positive for immunocompromised state.   Neurological: Positive for weakness. Negative for dizziness, seizures, light-headedness and headaches.   Psychiatric/Behavioral: Negative for behavioral problems, confusion, decreased concentration, dysphoric mood, hallucinations, sleep disturbance and suicidal ideas. The patient is not nervous/anxious.      Objective:     Vital Signs (Most Recent):  Temp: 97.9 °F (36.6 °C) (12/26/18 1245)  Pulse: 99 (12/26/18 1245)  Resp: (!) 22 (12/26/18 0730)  BP: 123/75 (12/26/18 1245)  SpO2: (!) 94 % (12/26/18 0730) Vital Signs (24h Range):  Temp:  [97.9 °F (36.6 °C)-99 °F (37.2 °C)] 97.9 °F (36.6 °C)  Pulse:  [] 99  Resp:  [21-27] 22  SpO2:  [94 %-99 %] 94 %  BP: (111-156)/(70-96) 123/75     Weight: 66.5  kg (146 lb 9.7 oz)  Body mass index is 21.04 kg/m².    Intake/Output - Last 3 Shifts       12/24 0700 - 12/25 0659 12/25 0700 - 12/26 0659 12/26 0700 - 12/27 0659    P.O. 0 0     I.V. (mL/kg) 150 (2.3) 100 (1.5)     Blood   300    Other 300      NG/GT  1110 40    Total Intake(mL/kg) 450 (6.8) 1210 (18.2) 340 (5.1)    Urine (mL/kg/hr)  325 (0.2)     Other 1600      Stool  0     Total Output 1600 325     Net -1150 +885 +340           Urine Occurrence 0 x 1 x 0 x    Stool Occurrence 0 x 2 x 0 x          Physical Exam   Constitutional: He is oriented to person, place, and time. He appears well-developed. No distress.   Temporal and distal extremity muscle wasting noted.   HENT:   Head: Normocephalic and atraumatic.   Mouth/Throat: Oropharynx is clear and moist. No oropharyngeal exudate.   Eyes: EOM are normal.   Neck: Normal range of motion. Neck supple. No JVD present.   Cardiovascular: Normal rate, regular rhythm, normal heart sounds and intact distal pulses.   No murmur heard.  Pulmonary/Chest: Effort normal. No respiratory distress. He has decreased breath sounds in the right lower field and the left lower field. He has no wheezes. He exhibits no tenderness.   Abdominal: Soft. Bowel sounds are normal. He exhibits no distension. There is no tenderness.   Chevron inc clean, dry, intact with steri strips in place     Musculoskeletal: Normal range of motion. He exhibits no edema or tenderness.   Neurological: He is alert and oriented to person, place, and time. He has normal reflexes.   Skin: Skin is warm and dry. Capillary refill takes 2 to 3 seconds. He is not diaphoretic. No pallor.   Psychiatric: He has a normal mood and affect. His behavior is normal. Thought content normal. His affect is not labile. He is not slowed. Cognition and memory are not impaired.   Nursing note and vitals reviewed.      Laboratory:  Immunosuppressants         Stop Route Frequency     tacrolimus capsule 2 mg      -- SL 2 times daily     "    CBC:   Recent Labs   Lab 12/26/18  0643   WBC 10.55   RBC 2.69*   HGB 7.5*   HCT 24.4*      MCV 91   MCH 27.9   MCHC 30.7*     CMP:   Recent Labs   Lab 12/26/18  0643   *   CALCIUM 9.0   ALBUMIN 2.9*   PROT 6.1      K 3.1*   CO2 25      BUN 19   CREATININE 1.8*   ALKPHOS 131   ALT 7*   AST 8*   BILITOT 1.1*     Labs within the past 24 hours have been reviewed.    Diagnostic Results:  I have personally reviewed all pertinent imaging studies.    Assessment/Plan:     * S/P liver transplant    - LFTs now improving slowly.  T bili was 37.6 day of transplant.  - Drain placed during take back. Drain placed to intra-abdominal abscess on 11/22.   - Fluid from collection noted with enterococcus VRE completed Zyvox treatment.  - Routine 1 month Liver US 12/17 which showed elevated velocity of hepatic artery and low RIs.   - Vascular Surgery consulted. Recommend repeat US in 10-14 days (12/27-12/31) to reassess. Appreciate Vascular recs.  LFTs stable.     Chest pain    - Pt c/o "burning" diffuse CP 12/21 AM.  - EKG NSR, stable from prior.  - Troponin wnl.  - Likely GI related pain.        Hypophosphatemia    - Continue to monitor.         Acute kidney failure with lesion of tubular necrosis    - Cont Dialysis MWF.  - Nephrology following, appreciate recs.  - Monitor.     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       Hypomagnesemia    - Continue to monitor & replace as needed.     Severe malnutrition    - Dietician following. Severe temporal, clavicle muscle depletion noted. Severe subq fat loss  - Nutrition has been poor over the past 24 hours.   - Discussed at length with patient and caregivers that if patient's nutrition status does not improve, will need supplemental nutrition via tube feeds or TPN.  - Appetite stimulant started 12/19.   - Caregivers to bring in outside food for patient.   - Continue calorie count.  - EGD Monday 12/24 as pt " c/o poor appetite + burning in chest/esophagus and occasionally vomiting after eating x several weeks.   - EGD 12/24 unremarkable. prabhakar tube placed afterwards.   - TF started for nutrition.   - Prealbumin 7 on 12/26.     Non-intractable vomiting with nausea    - Intermittent, worse over past 10 days,  Changed Pepcid to Protonix 12/9/18.  Appetite seems to be slowly improving.    - Encourage multiple, small meals and cont PRN anti-emetics.  If no improvement, may need GI consult.    - GI symptoms now slowly improving.        Anxiety    - Restarted Lexapro as per psych recs, 12/13.   - Monitor.     Delirium    - Pt with intermittent confusion since transplant was improving slowly.   - Pt with some lethargy and confusion on 11/21. Head CT negative.   - AAOx3. More alert and awake on 11/27.   - AAOx4 on 11/29. Seroquel d/c'd 11/30/18.  Trazodone for insomnia ordered w PRN dose.    - Pt then with delirium again. re consulted psych.   - delirium could be d/t rising prograf.  AMS improved since holding Prograf.    - CT head 12/4 with no acute findings.   - Restarted Prograf 12/6- will need to monitor mental status closely.  - 12/10 delirium resolved.     Prolonged Q-T interval on ECG    -  ms on EKG 12/19.  -  on EKG 12/21.  - Monitor.       Alcohol use disorder, severe, in early remission, dependence    - Monitor.       Decreased appetite    - Continue appetite stimulant.  - GI consulted as pt c/o burning sensation in chest/esophagus + vomiting after eating, passed SLP eval, recommended GI f/u.  - Cont to encourage PO intake. Ordered additional supplements.   - EGD , 12/24 unremarkable. prabhakar tube placed and TF started. Continue TF at goal rate.  - Prealbumin 7 on 12/26.     Acute renal failure with acute tubular necrosis superimposed on stage 3 chronic kidney disease    - 2 weeks for DEL prior to transplant.  - Renal function slow to recover post-op.   - Nephrology consulted for management.   - Cont with HD as  per nephrology recs.  Apprec recs.    - Lasix 160 mg challenge 11/28 w/ minimal output.    - HD MWF. Tolerated well 12/26.  - Strict I&Os.      Intra-abdominal abscess    - Significant increase in WBC post-op.   - OR cultures from 11/18 noted with enterococcus VRE.   - Abdominal CT performed at that time with fluid collection and drain placed on 11/22 for drainage.   - Intra-abdominal abscess culture also with enterococcus VRE.   - ID consulted and pt placed on antibxs for treatment. Pt was on Cefepime, Daptomycin, and Fluconazole for treatment.    - Pt remains with RLQ drains (one JOSE A and one Percutaneous).  One JOSE A removed 11/28.  Perc drain in placed draining tan, clear drainage.  WBC slowly improving.   - Liver US on 11/26 reviewed.   - Abdominal CT performed 11/27 and reviewed. Fluid collection noted with improvement on CT.  ID following and reassured by findings.    - Dapto, Cefepime and Flagyl changed 11/30/18 to Linezolid 600 mg PO q 12 hr x 10 days per ID.     - Added back cefepime after thora.  ID re consulted.  - Completed Zyvox x 10 days per ID thru 12/9/18. Monitor.        Long-term use of immunosuppressant medication    - Cont with prograf. Draw prograf level daily and adjust dose as needed to maintain a therapeutic level.        At risk for opportunistic infections    - Cont with bactrim and valcyte for protection against opportunistic infections.   - Cont Isavuconazonium for fungal prophylaxis.     Leukocytosis    - See intra-abdominal abscess.  - wbc improving, cont w delirium.    - Repeat cx 12/4 with NGTD.  Completed Linezolid and Cefepime per ID.    - Wbc trended up 12/11- repeat cx 12/11.  - WBC count improving.       Adrenal cortical steroids causing adverse effect in therapeutic use    - Monitor.       Prophylactic immunotherapy    - See long term immunosuppression.        Weakness    - Pt remains significantly debilitated.   - PT/OT for strengthening.   - Currently will need SNF for placement  and further rehabilitation.   - Pt remains severely debilitated and not strong enough for SNF at this time.    - SNF consult placed 12/17. However, denied for SNF again as not strong enough at this time.  - Rehab consulted, per MSW- ins. will not cover rehab.    - Continue aggressive therapy.     Pleural effusion associated with hepatic disorder    - c/o increased pain w deep breath today to right side.  CXR showed increased opacity in the inferior hemothorax on the right side since 11/26/2018.  Pulse ox 97-99% on RA.  Cont to monitor.   - continues to have fluid to RLL.  WBC remains elevated.    - thoracentesis performed on 11/30. Fluid noted with 4k WBC, 93 SEGS. cx no growth to date.  Cefepime added for treatment.  - Chest CT showing right pleural effusion improved, left w increased pleural effusion.   - Per ID, completed treatment of empyema w Cefepime thru 12/8.  - sats remain 97-99% on RA.  - CXR 12/20 showed left hemidiaphragmatic margin that is better delineated than on 12/19/2018, consistent with improved aeration in the left lower lobe. + no significant detrimental interval change in the appearance of the chest, with the current examination demonstrating a slightly improved inspiratory depth level.    - denies SOB.  Last CXR 12/22 w improvement.     Type 2 diabetes mellitus without complication    - Endocrine consulted for management. Appreciate recs.   - Hypoglycemia 12/10 requiring D50.    - Repeat cx 12/11 - no growth       Anemia of chronic disease    - H&H 7.5/24.4. Transfuse 1 unit PRBCs with HD today. Monitor with daily labs.   - Chest/Abd/pelvis CT 12/4- no fluid to be drainged per transplant surgeon.  No source of bleeding.     - Last liver US 11/27. Evolving peritransplant hematomas with anechoic fluid collection adjacent to the right hepatic lobe.  Small volume perihepatic free fluid.         VTE Risk Mitigation (From admission, onward)        Ordered     heparin (porcine) injection 5,000 Units   Every 8 hours      12/24/18 0742     heparin (porcine) injection 1,000 Units  As needed (PRN)      12/12/18 1731     IP VTE HIGH RISK PATIENT  Once      11/11/18 1208          The patients clinical status was discussed at multidisplinary rounds, involving transplant surgery, transplant medicine, pharmacy, nursing, nutrition, and social work    Discharge Planning: not a candidate for dc at this time.       Pamela Shirley NP  Liver Transplant  [facility]

## 2018-12-26 NOTE — ASSESSMENT & PLAN NOTE
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     Plan:  - Patient oliguric (making some urine, 325 mL/24 hr).   - HD x 4 hrs today for metabolic clearance and volume management, UF 2 L.   - Phos 1.7, not on binders   - Hgb 7.5, remains on Epo 4200 units MWF. Received 1 unit of PRBCs with iHD treatment today.   - Strict I/O and chart  - Avoid nephrotoxic medications  - Maintain MAP >65 please continue midodrine  - please keep Hb > 7 gm/dL  - Medication doses adjusted to GFR  - Regular Diet and TF.

## 2018-12-26 NOTE — SUBJECTIVE & OBJECTIVE
Interval History:Patient evaluated while undergoing hemodialysis. Tolerated session with current UFR well, UF 2 L. Patient clotted once and received 1 unit of PRBCs during treatment. Denied headaches, SOB, chest pain, abdominal pain, or muscle cramps.    Review of patient's allergies indicates:   Allergen Reactions    Penicillins Nausea And Vomiting and Rash     Full body rash from cefepime as well     Current Facility-Administered Medications   Medication Frequency    0.9%  NaCl infusion (for blood administration) Q24H PRN    0.9%  NaCl infusion PRN    acetaminophen tablet 650 mg Q6H PRN    albuterol-ipratropium 2.5 mg-0.5 mg/3 mL nebulizer solution 3 mL Q4H PRN    artificial tears 0.5 % ophthalmic solution 1 drop PRN    aspirin chewable tablet 81 mg Daily    bacitracin ointment PRN    bisacodyl suppository 10 mg Daily PRN    dextrose 50% injection 12.5 g PRN    dextrose 50% injection 25 g PRN    epoetin sherlyn injection 4,200 Units Every Mon, Wed, Fri    ergocalciferol capsule 50,000 Units Q7 Days    escitalopram oxalate 5 mg/5 mL solution 20 mg Daily    glucagon (human recombinant) injection 1 mg PRN    glucose chewable tablet 16 g PRN    glucose chewable tablet 24 g PRN    heparin (porcine) injection 1,000 Units PRN    heparin (porcine) injection 5,000 Units Q8H    hydrALAZINE injection 10 mg Q6H PRN    isavuconazonium sulfate Cap 372 mg Daily    lidocaine 5 % patch 1 patch Q24H    metoprolol 12.5mg/1.25mL oral solution 12.5 mg BID    midodrine tablet 10 mg TID PRN    mirtazapine tablet 7.5 mg QHS    ondansetron injection 4 mg Q6H PRN    pantoprazole injection 40 mg BID    ramelteon tablet 8 mg Nightly PRN    sulfamethoxazole-trimethoprim 400-80mg per tablet 1 tablet Every Mon, Wed, Fri    tacrolimus capsule 2 mg BID    tiZANidine tablet 2 mg Q8H PRN    traMADol tablet 50 mg Q6H PRN    ursodiol oral suspension (conc: 60 mg/mL) 300 mg BID    valganciclovir 50 mg/ml oral solution  100 mg Once per day on Mon Wed Fri       Objective:     Vital Signs (Most Recent):  Temp: 97.9 °F (36.6 °C) (12/26/18 1245)  Pulse: 98 (12/26/18 1400)  Resp: (!) 24 (12/26/18 1400)  BP: 116/82 (12/26/18 1400)  SpO2: 98 % (12/26/18 1400)  O2 Device (Oxygen Therapy): room air (12/26/18 0730) Vital Signs (24h Range):  Temp:  [97.9 °F (36.6 °C)-99 °F (37.2 °C)] 97.9 °F (36.6 °C)  Pulse:  [] 98  Resp:  [21-27] 24  SpO2:  [94 %-99 %] 98 %  BP: (111-156)/(70-96) 116/82     Weight: 66.5 kg (146 lb 9.7 oz) (12/26/18 0535)  Body mass index is 21.04 kg/m².  Body surface area is 1.81 meters squared.    I/O last 3 completed shifts:  In: 1210 [I.V.:100; NG/GT:1110]  Out: 325 [Urine:325]    Physical Exam   Constitutional: He is oriented to person, place, and time. He appears well-developed. No distress.   HENT:   Head: Normocephalic and atraumatic.   Mouth/Throat: Oropharynx is clear and moist. No oropharyngeal exudate.   Neck: Normal range of motion. Neck supple. No JVD present.   Cardiovascular: Normal rate, regular rhythm and normal heart sounds.   No murmur heard.  Pulmonary/Chest: Effort normal. No respiratory distress. He has no decreased breath sounds. He has no wheezes. He exhibits no tenderness.   Abdominal: Soft. Bowel sounds are normal. He exhibits no distension. There is no tenderness.   Musculoskeletal: Normal range of motion. He exhibits no edema or tenderness.   Neurological: He is alert and oriented to person, place, and time. He has normal reflexes.   Skin: Skin is warm and dry. He is not diaphoretic. No pallor.   Psychiatric: He has a normal mood and affect. His behavior is normal. Thought content normal. His affect is not labile. He is not slowed. Cognition and memory are not impaired.   Nursing note and vitals reviewed.      Significant Labs:  CBC:   Recent Labs   Lab 12/26/18  0643   WBC 10.55   RBC 2.69*   HGB 7.5*   HCT 24.4*      MCV 91   MCH 27.9   MCHC 30.7*     CMP:   Recent Labs   Lab  12/26/18  0643   *   CALCIUM 9.0   ALBUMIN 2.9*   PROT 6.1      K 3.1*   CO2 25      BUN 19   CREATININE 1.8*   ALKPHOS 131   ALT 7*   AST 8*   BILITOT 1.1*

## 2018-12-26 NOTE — PLAN OF CARE
Problem: Patient Care Overview  Goal: Plan of Care Review  Outcome: Ongoing (interventions implemented as appropriate)  Pt aao x 4. VSS. Bed in low locked pos call light within reach. Pt calls for assistance when needed. TF JHON at goal rate. Meds crushed and given via JHON.  Bs monitoted ac/hs, ss insulin given as ordered.HD done MWF.

## 2018-12-26 NOTE — SUBJECTIVE & OBJECTIVE
Scheduled Meds:   aspirin  81 mg Oral Daily    epoetin sherlyn (PROCRIT) injection  50 Units/kg (Dosing Weight) Intravenous Every Mon, Wed, Fri    ergocalciferol  50,000 Units Oral Q7 Days    escitalopram oxalate  20 mg Oral Daily    heparin (porcine)  5,000 Units Subcutaneous Q8H    isavuconazonium sulfate  372 mg Oral Daily    lidocaine  1 patch Transdermal Q24H    metoprolol  12.5 mg Per NG tube BID    mirtazapine  7.5 mg Oral QHS    pantoprazole  40 mg Intravenous BID    sulfamethoxazole-trimethoprim 400-80mg  1 tablet Oral Every Mon, Wed, Fri    tacrolimus  2 mg Sublingual BID    ursodiol  300 mg Oral BID    valganciclovir 50 mg/ml  100 mg Oral Once per day on Mon Wed Fri     Continuous Infusions:  PRN Meds:sodium chloride, sodium chloride 0.9%, acetaminophen, albuterol-ipratropium, artificial tears, bacitracin, bisacodyl, dextrose 50%, dextrose 50%, glucagon (human recombinant), glucose, glucose, heparin (porcine), hydrALAZINE, midodrine, ondansetron, ramelteon, tiZANidine, traMADol    Review of Systems   Constitutional: Positive for activity change and appetite change (decreased). Negative for fatigue and fever.   HENT: Negative for congestion, facial swelling, sore throat and voice change.    Eyes: Negative for pain, discharge and visual disturbance.   Respiratory: Negative for apnea, cough, choking, chest tightness, shortness of breath and wheezing.    Cardiovascular: Negative for chest pain, palpitations and leg swelling.   Gastrointestinal: Positive for nausea. Negative for abdominal distention, abdominal pain, constipation, diarrhea and vomiting.   Endocrine: Negative.    Genitourinary: Positive for decreased urine volume. Negative for difficulty urinating, dysuria, hematuria and urgency.   Musculoskeletal: Negative.  Negative for back pain, gait problem, neck pain and neck stiffness.   Skin: Positive for wound. Negative for color change and pallor.   Allergic/Immunologic: Positive for  immunocompromised state.   Neurological: Positive for weakness. Negative for dizziness, seizures, light-headedness and headaches.   Psychiatric/Behavioral: Negative for behavioral problems, confusion, decreased concentration, dysphoric mood, hallucinations, sleep disturbance and suicidal ideas. The patient is not nervous/anxious.      Objective:     Vital Signs (Most Recent):  Temp: 97.9 °F (36.6 °C) (12/26/18 1245)  Pulse: 99 (12/26/18 1245)  Resp: (!) 22 (12/26/18 0730)  BP: 123/75 (12/26/18 1245)  SpO2: (!) 94 % (12/26/18 0730) Vital Signs (24h Range):  Temp:  [97.9 °F (36.6 °C)-99 °F (37.2 °C)] 97.9 °F (36.6 °C)  Pulse:  [] 99  Resp:  [21-27] 22  SpO2:  [94 %-99 %] 94 %  BP: (111-156)/(70-96) 123/75     Weight: 66.5 kg (146 lb 9.7 oz)  Body mass index is 21.04 kg/m².    Intake/Output - Last 3 Shifts       12/24 0700 - 12/25 0659 12/25 0700 - 12/26 0659 12/26 0700 - 12/27 0659    P.O. 0 0     I.V. (mL/kg) 150 (2.3) 100 (1.5)     Blood   300    Other 300      NG/GT  1110 40    Total Intake(mL/kg) 450 (6.8) 1210 (18.2) 340 (5.1)    Urine (mL/kg/hr)  325 (0.2)     Other 1600      Stool  0     Total Output 1600 325     Net -1150 +885 +340           Urine Occurrence 0 x 1 x 0 x    Stool Occurrence 0 x 2 x 0 x          Physical Exam   Constitutional: He is oriented to person, place, and time. He appears well-developed. No distress.   Temporal and distal extremity muscle wasting noted.   HENT:   Head: Normocephalic and atraumatic.   Mouth/Throat: Oropharynx is clear and moist. No oropharyngeal exudate.   Eyes: EOM are normal.   Neck: Normal range of motion. Neck supple. No JVD present.   Cardiovascular: Normal rate, regular rhythm, normal heart sounds and intact distal pulses.   No murmur heard.  Pulmonary/Chest: Effort normal. No respiratory distress. He has decreased breath sounds in the right lower field and the left lower field. He has no wheezes. He exhibits no tenderness.   Abdominal: Soft. Bowel sounds are  normal. He exhibits no distension. There is no tenderness.   Chevron inc clean, dry, intact with steri strips in place     Musculoskeletal: Normal range of motion. He exhibits no edema or tenderness.   Neurological: He is alert and oriented to person, place, and time. He has normal reflexes.   Skin: Skin is warm and dry. Capillary refill takes 2 to 3 seconds. He is not diaphoretic. No pallor.   Psychiatric: He has a normal mood and affect. His behavior is normal. Thought content normal. His affect is not labile. He is not slowed. Cognition and memory are not impaired.   Nursing note and vitals reviewed.      Laboratory:  Immunosuppressants         Stop Route Frequency     tacrolimus capsule 2 mg      -- SL 2 times daily        CBC:   Recent Labs   Lab 12/26/18  0643   WBC 10.55   RBC 2.69*   HGB 7.5*   HCT 24.4*      MCV 91   MCH 27.9   MCHC 30.7*     CMP:   Recent Labs   Lab 12/26/18  0643   *   CALCIUM 9.0   ALBUMIN 2.9*   PROT 6.1      K 3.1*   CO2 25      BUN 19   CREATININE 1.8*   ALKPHOS 131   ALT 7*   AST 8*   BILITOT 1.1*     Labs within the past 24 hours have been reviewed.    Diagnostic Results:  I have personally reviewed all pertinent imaging studies.

## 2018-12-26 NOTE — PT/OT/SLP PROGRESS
Speech Language Pathology      Femi Enciso  MRN: [id number]    Patient not seen today secondary to dialysis. Will follow-up later in day if schedule permits or tomorrow. NSG reports pt with poor intake & some difficulty taking pill. Pt reports having pain caused by prabhakar tube in for poor intake purposes. Pt reports burning when he swallows because of tube. NSG reports they have switched meds to liquid & crushed. Pt had GI testing last week.    Fabby Cox, CCC-SLP

## 2018-12-26 NOTE — PT/OT/SLP PROGRESS
Physical Therapy      Patient Name:  Femi Enciso   MRN:  [id number]    Patient not seen today secondary to off floor for dialysis.  PT unable to return later in day. Will follow-up next scheduled date.    Percy Coleman, PT   12/26/2018

## 2018-12-26 NOTE — PROGRESS NOTES
[facility]  Nephrology  Progress Note    Patient Name: Femi Enciso  MRN: [id number]  Admission Date: 10/27/2018  Hospital Length of Stay: 60 days  Attending Provider: Femi Patel MD   Primary Care Physician: Primary Doctor No  Principal Problem:S/P liver transplant    Subjective:     HPI: 54 y/o man with DM2 presents to the ED with family for liver failure (likely due to EtOH abundant history of drinking - diagnosed in Sept 2018 in [place]).  He reports jaundice, generalized weakness, nausea, diarrhea, and decreased appetite since Sept 2018.  He is from [place], [place], and Dr. Sharma (patients physician) recommended bringing him to hospital for evaluation.  Patient denies any fever, chills, vomiting, chest pain, palpitations, SOB, abdominal pain.      Nephrology consulted for evaluation/management Adri.     Interval History:Patient evaluated while undergoing hemodialysis. Tolerated session with current UFR well, UF 2 L. Patient clotted once and received 1 unit of PRBCs during treatment. Denied headaches, SOB, chest pain, abdominal pain, or muscle cramps.    Review of patient's allergies indicates:   Allergen Reactions    Penicillins Nausea And Vomiting and Rash     Full body rash from cefepime as well     Current Facility-Administered Medications   Medication Frequency    0.9%  NaCl infusion (for blood administration) Q24H PRN    0.9%  NaCl infusion PRN    acetaminophen tablet 650 mg Q6H PRN    albuterol-ipratropium 2.5 mg-0.5 mg/3 mL nebulizer solution 3 mL Q4H PRN    artificial tears 0.5 % ophthalmic solution 1 drop PRN    aspirin chewable tablet 81 mg Daily    bacitracin ointment PRN    bisacodyl suppository 10 mg Daily PRN    dextrose 50% injection 12.5 g PRN    dextrose 50% injection 25 g PRN    epoetin sherlyn injection 4,200 Units Every Mon, Wed, Fri    ergocalciferol capsule 50,000 Units Q7 Days    escitalopram oxalate 5 mg/5 mL solution 20 mg Daily    glucagon (human recombinant)  injection 1 mg PRN    glucose chewable tablet 16 g PRN    glucose chewable tablet 24 g PRN    heparin (porcine) injection 1,000 Units PRN    heparin (porcine) injection 5,000 Units Q8H    hydrALAZINE injection 10 mg Q6H PRN    isavuconazonium sulfate Cap 372 mg Daily    lidocaine 5 % patch 1 patch Q24H    metoprolol 12.5mg/1.25mL oral solution 12.5 mg BID    midodrine tablet 10 mg TID PRN    mirtazapine tablet 7.5 mg QHS    ondansetron injection 4 mg Q6H PRN    pantoprazole injection 40 mg BID    ramelteon tablet 8 mg Nightly PRN    sulfamethoxazole-trimethoprim 400-80mg per tablet 1 tablet Every Mon, Wed, Fri    tacrolimus capsule 2 mg BID    tiZANidine tablet 2 mg Q8H PRN    traMADol tablet 50 mg Q6H PRN    ursodiol oral suspension (conc: 60 mg/mL) 300 mg BID    valganciclovir 50 mg/ml oral solution 100 mg Once per day on Mon Wed Fri       Objective:     Vital Signs (Most Recent):  Temp: 97.9 °F (36.6 °C) (12/26/18 1245)  Pulse: 98 (12/26/18 1400)  Resp: (!) 24 (12/26/18 1400)  BP: 116/82 (12/26/18 1400)  SpO2: 98 % (12/26/18 1400)  O2 Device (Oxygen Therapy): room air (12/26/18 0730) Vital Signs (24h Range):  Temp:  [97.9 °F (36.6 °C)-99 °F (37.2 °C)] 97.9 °F (36.6 °C)  Pulse:  [] 98  Resp:  [21-27] 24  SpO2:  [94 %-99 %] 98 %  BP: (111-156)/(70-96) 116/82     Weight: 66.5 kg (146 lb 9.7 oz) (12/26/18 0535)  Body mass index is 21.04 kg/m².  Body surface area is 1.81 meters squared.    I/O last 3 completed shifts:  In: 1210 [I.V.:100; NG/GT:1110]  Out: 325 [Urine:325]    Physical Exam   Constitutional: He is oriented to person, place, and time. He appears well-developed. No distress.   HENT:   Head: Normocephalic and atraumatic.   Mouth/Throat: Oropharynx is clear and moist. No oropharyngeal exudate.   Neck: Normal range of motion. Neck supple. No JVD present.   Cardiovascular: Normal rate, regular rhythm and normal heart sounds.   No murmur heard.  Pulmonary/Chest: Effort normal. No  respiratory distress. He has no decreased breath sounds. He has no wheezes. He exhibits no tenderness.   Abdominal: Soft. Bowel sounds are normal. He exhibits no distension. There is no tenderness.   Musculoskeletal: Normal range of motion. He exhibits no edema or tenderness.   Neurological: He is alert and oriented to person, place, and time. He has normal reflexes.   Skin: Skin is warm and dry. He is not diaphoretic. No pallor.   Psychiatric: He has a normal mood and affect. His behavior is normal. Thought content normal. His affect is not labile. He is not slowed. Cognition and memory are not impaired.   Nursing note and vitals reviewed.      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     Plan:  - Patient oliguric (making some urine, 325 mL/24 hr).   - HD x 4 hrs today for metabolic clearance and volume management, UF 2 L.   - Phos 1.7, not on binders   - Hgb 7.5, remains on Epo 4200 units MWF. Received 1 unit of PRBCs with iHD treatment today.   - Strict I/O and chart  - Avoid nephrotoxic medications  - Maintain MAP >65 please continue midodrine  - please keep Hb > 7 gm/dL  - Medication doses adjusted to GFR  - Regular Diet and TF.              Case discussed with staff further recs with attestation.     I will follow-up with patient. Please contact us if you have any  additional questions.    TAMELA Rousseau DNP, APRN, FNP-C  Department of Nephrology  [facility]  Pager: [phone]

## 2018-12-26 NOTE — ASSESSMENT & PLAN NOTE
- Dietician following. Severe temporal, clavicle muscle depletion noted. Severe subq fat loss  - Nutrition has been poor over the past 24 hours.   - Discussed at length with patient and caregivers that if patient's nutrition status does not improve, will need supplemental nutrition via tube feeds or TPN.  - Appetite stimulant started 12/19.   - Caregivers to bring in outside food for patient.   - Continue calorie count.  - EGD Monday 12/24 as pt c/o poor appetite + burning in chest/esophagus and occasionally vomiting after eating x several weeks.   - EGD 12/24 unremarkable. prabhakar tube placed afterwards.   - TF started for nutrition.   - Prealbumin 7 on 12/26.

## 2018-12-26 NOTE — ASSESSMENT & PLAN NOTE
- Pt remains significantly debilitated.   - PT/OT for strengthening.   - Currently will need SNF for placement and further rehabilitation.   - Pt remains severely debilitated and not strong enough for SNF at this time.    - SNF consult placed 12/17. However, denied for SNF again as not strong enough at this time.  - Rehab consulted, per MSW- ins. will not cover rehab.    - Continue aggressive therapy.

## 2018-12-26 NOTE — PROGRESS NOTES
Dialysis tx completed. 4 hours. 2 liters removed. 1 unit RBC,s transfused. Right CVC locked with heparin, capped and secured. Tolerated tx well. Report given to Maria A JAMESON.

## 2018-12-26 NOTE — ANESTHESIA POSTPROCEDURE EVALUATION
"Anesthesia Post Evaluation    Patient: Femi Enciso    Procedure(s) Performed: Procedure(s) (LRB):  EGD (ESOPHAGOGASTRODUODENOSCOPY) (N/A)    Final Anesthesia Type: general  Patient location during evaluation: PACU  Patient participation: Yes- Able to Participate  Level of consciousness: awake and alert and oriented  Post-procedure vital signs: reviewed and stable  Pain management: adequate  Airway patency: patent  PONV status at discharge: No PONV  Anesthetic complications: no      Cardiovascular status: blood pressure returned to baseline and hemodynamically stable  Respiratory status: unassisted  Hydration status: euvolemic  Follow-up not needed.        Visit Vitals  BP (!) 150/95   Pulse 93   Temp 37.2 °C (99 °F) (Oral)   Resp (!) 26   Ht 5' 10" (1.778 m)   Wt 66.5 kg (146 lb 9.7 oz)   SpO2 98%   BMI 21.04 kg/m²       Pain/Torrey Score: Pain Rating Prior to Med Admin: 6 (12/26/2018  1:10 AM)        "

## 2018-12-26 NOTE — PLAN OF CARE
Problem: Patient Care Overview  Goal: Plan of Care Review  Outcome: Ongoing (interventions implemented as appropriate)  Recommendation/Intervention:   1. Continue TF @goal, meeting 100% EEN/EPN   2. D/C Boost Pudding due to not on hospital formulary.   3. D/C Boost Plus- patient not consuming   4. Change Boost Breeze to Orange Flavor only   5. Encourage good po intake    Dietitian Following     Goals: Patient to meet >85% of EEN/EPN  Nutrition Goal Status: goal met      NFPE 12/14/18  Malnutrition in the context of Chronic Illness/Injury     Related to (etiology):  Cirrhosis s/p OLTx 11/11 and poor appetite      Signs and Symptoms (as evidenced by):  Energy Intake: <75% of estimated energy requirement for 2 months  Body Fat Depletion: severe overall subcutaneous fat loss and depletion of orbital area, triceps as well as protruding patellas, ribs and acromion process  Muscle Mass Depletion: severe scapular, temporal, calf, quadricep muscle wasting   Weight Loss: 16.5% x 2 months     Nutrition Diagnosis Status:   Continues    Full assessment completed, see Dietitian Note 12/26/2018.

## 2018-12-26 NOTE — ASSESSMENT & PLAN NOTE
- See intra-abdominal abscess.  - wbc improving, cont w delirium.    - Repeat cx 12/4 with NGTD.  Completed Linezolid and Cefepime per ID.    - Wbc trended up 12/11- repeat cx 12/11.  - WBC count improving.

## 2018-12-26 NOTE — ASSESSMENT & PLAN NOTE
- Continue appetite stimulant.  - GI consulted as pt c/o burning sensation in chest/esophagus + vomiting after eating, passed SLP eval, recommended GI f/u.  - Cont to encourage PO intake. Ordered additional supplements.   - EGD , 12/24 unremarkable. prabhakar tube placed and TF started. Continue TF at goal rate.  - Prealbumin 7 on 12/26.

## 2018-12-26 NOTE — PROGRESS NOTES
Patient arrived on unit via stretcher. Weight obtained in bed @ 64.6kg. Dialysis initiated via right CVC. Ports aspirated and flushed without difficulty. Lines connected and secured. Orders and machine settings verified. Tx started.

## 2018-12-26 NOTE — NURSING
Patient returned to floor with transport via stretcher. ViSi calibrated, VS stable, am meds administered per GT. TF restarted. Patient sipping water without difficulty. Patient c/o back pain, gave PRN tramadol.

## 2018-12-27 PROBLEM — E66.3 OVERWEIGHT (BMI 25.0-29.9): Status: RESOLVED | Noted: 2018-11-13 | Resolved: 2018-12-27

## 2018-12-27 PROBLEM — Z74.09 IMPAIRED FUNCTIONAL MOBILITY AND ENDURANCE: Status: ACTIVE | Noted: 2018-12-27

## 2018-12-27 LAB
ALBUMIN SERPL BCP-MCNC: 2.6 G/DL
ALP SERPL-CCNC: 142 U/L
ALT SERPL W/O P-5'-P-CCNC: 10 U/L
ANION GAP SERPL CALC-SCNC: 6 MMOL/L
AST SERPL-CCNC: 11 U/L
BASOPHILS # BLD AUTO: 0.28 K/UL
BASOPHILS NFR BLD: 2.4 %
BILIRUB SERPL-MCNC: 1.1 MG/DL
BUN SERPL-MCNC: 17 MG/DL
CALCIUM SERPL-MCNC: 9.1 MG/DL
CHLORIDE SERPL-SCNC: 106 MMOL/L
CO2 SERPL-SCNC: 27 MMOL/L
CREAT SERPL-MCNC: 1.5 MG/DL
DIFFERENTIAL METHOD: ABNORMAL
EOSINOPHIL # BLD AUTO: 0.9 K/UL
EOSINOPHIL NFR BLD: 8 %
ERYTHROCYTE [DISTWIDTH] IN BLOOD BY AUTOMATED COUNT: 15.7 %
EST. GFR  (AFRICAN AMERICAN): >60 ML/MIN/1.73 M^2
EST. GFR  (NON AFRICAN AMERICAN): 52.4 ML/MIN/1.73 M^2
GLUCOSE SERPL-MCNC: 140 MG/DL
HCT VFR BLD AUTO: 30.3 %
HGB BLD-MCNC: 9.5 G/DL
IMM GRANULOCYTES # BLD AUTO: 0.22 K/UL
IMM GRANULOCYTES NFR BLD AUTO: 1.9 %
LYMPHOCYTES # BLD AUTO: 1.1 K/UL
LYMPHOCYTES NFR BLD: 9 %
MAGNESIUM SERPL-MCNC: 1.6 MG/DL
MCH RBC QN AUTO: 28.8 PG
MCHC RBC AUTO-ENTMCNC: 31.4 G/DL
MCV RBC AUTO: 92 FL
MONOCYTES # BLD AUTO: 1.7 K/UL
MONOCYTES NFR BLD: 14.7 %
NEUTROPHILS # BLD AUTO: 7.5 K/UL
NEUTROPHILS NFR BLD: 64 %
NRBC BLD-RTO: 0 /100 WBC
PHOSPHATE SERPL-MCNC: 1.2 MG/DL
PLATELET # BLD AUTO: 225 K/UL
PMV BLD AUTO: 11.8 FL
POCT GLUCOSE: 142 MG/DL (ref 70–110)
POCT GLUCOSE: 147 MG/DL (ref 70–110)
POCT GLUCOSE: 165 MG/DL (ref 70–110)
POCT GLUCOSE: 181 MG/DL (ref 70–110)
POTASSIUM SERPL-SCNC: 3.6 MMOL/L
PROT SERPL-MCNC: 6.2 G/DL
RBC # BLD AUTO: 3.3 M/UL
SODIUM SERPL-SCNC: 139 MMOL/L
TACROLIMUS BLD-MCNC: 4.5 NG/ML
WBC # BLD AUTO: 11.73 K/UL

## 2018-12-27 PROCEDURE — 25000003 PHARM REV CODE 250: Performed by: NURSE PRACTITIONER

## 2018-12-27 PROCEDURE — 99900035 HC TECH TIME PER 15 MIN (STAT)

## 2018-12-27 PROCEDURE — 80197 ASSAY OF TACROLIMUS: CPT

## 2018-12-27 PROCEDURE — 92610 EVALUATE SWALLOWING FUNCTION: CPT

## 2018-12-27 PROCEDURE — 36415 COLL VENOUS BLD VENIPUNCTURE: CPT

## 2018-12-27 PROCEDURE — G8997 SWALLOW GOAL STATUS: HCPCS | Mod: CH

## 2018-12-27 PROCEDURE — 99233 SBSQ HOSP IP/OBS HIGH 50: CPT | Mod: 24,,, | Performed by: NURSE PRACTITIONER

## 2018-12-27 PROCEDURE — 97530 THERAPEUTIC ACTIVITIES: CPT

## 2018-12-27 PROCEDURE — 99232 PR SUBSEQUENT HOSPITAL CARE,LEVL II: ICD-10-PCS | Mod: ,,, | Performed by: NURSE PRACTITIONER

## 2018-12-27 PROCEDURE — 80053 COMPREHEN METABOLIC PANEL: CPT

## 2018-12-27 PROCEDURE — 25000242 PHARM REV CODE 250 ALT 637 W/ HCPCS: Performed by: STUDENT IN AN ORGANIZED HEALTH CARE EDUCATION/TRAINING PROGRAM

## 2018-12-27 PROCEDURE — 63600175 PHARM REV CODE 636 W HCPCS: Performed by: NURSE PRACTITIONER

## 2018-12-27 PROCEDURE — 25000003 PHARM REV CODE 250: Performed by: STUDENT IN AN ORGANIZED HEALTH CARE EDUCATION/TRAINING PROGRAM

## 2018-12-27 PROCEDURE — C9113 INJ PANTOPRAZOLE SODIUM, VIA: HCPCS | Performed by: NURSE PRACTITIONER

## 2018-12-27 PROCEDURE — 94640 AIRWAY INHALATION TREATMENT: CPT

## 2018-12-27 PROCEDURE — 84100 ASSAY OF PHOSPHORUS: CPT

## 2018-12-27 PROCEDURE — 20600001 HC STEP DOWN PRIVATE ROOM

## 2018-12-27 PROCEDURE — 83735 ASSAY OF MAGNESIUM: CPT

## 2018-12-27 PROCEDURE — 99232 SBSQ HOSP IP/OBS MODERATE 35: CPT | Mod: ,,, | Performed by: NURSE PRACTITIONER

## 2018-12-27 PROCEDURE — 85025 COMPLETE CBC W/AUTO DIFF WBC: CPT

## 2018-12-27 PROCEDURE — G8996 SWALLOW CURRENT STATUS: HCPCS | Mod: CH

## 2018-12-27 PROCEDURE — 36430 TRANSFUSION BLD/BLD COMPNT: CPT

## 2018-12-27 PROCEDURE — 99233 PR SUBSEQUENT HOSPITAL CARE,LEVL III: ICD-10-PCS | Mod: 24,,, | Performed by: NURSE PRACTITIONER

## 2018-12-27 RX ORDER — MIRTAZAPINE 7.5 MG/1
7.5 TABLET, FILM COATED ORAL NIGHTLY PRN
Status: DISCONTINUED | OUTPATIENT
Start: 2018-12-27 | End: 2018-12-31

## 2018-12-27 RX ADMIN — ESCITALOPRAM OXALATE 20 MG: 5 SOLUTION ORAL at 09:12

## 2018-12-27 RX ADMIN — HEPARIN SODIUM 5000 UNITS: 5000 INJECTION, SOLUTION INTRAVENOUS; SUBCUTANEOUS at 09:12

## 2018-12-27 RX ADMIN — PANTOPRAZOLE SODIUM 40 MG: 40 INJECTION, POWDER, LYOPHILIZED, FOR SOLUTION INTRAVENOUS at 09:12

## 2018-12-27 RX ADMIN — URSODIOL 300 MG: 300 CAPSULE ORAL at 09:12

## 2018-12-27 RX ADMIN — ERGOCALCIFEROL 50000 UNITS: 1.25 CAPSULE ORAL at 09:12

## 2018-12-27 RX ADMIN — TRAMADOL HYDROCHLORIDE 50 MG: 50 TABLET, FILM COATED ORAL at 05:12

## 2018-12-27 RX ADMIN — HEPARIN SODIUM 5000 UNITS: 5000 INJECTION, SOLUTION INTRAVENOUS; SUBCUTANEOUS at 05:12

## 2018-12-27 RX ADMIN — TIZANIDINE 2 MG: 2 TABLET ORAL at 09:12

## 2018-12-27 RX ADMIN — HEPARIN SODIUM 5000 UNITS: 5000 INJECTION, SOLUTION INTRAVENOUS; SUBCUTANEOUS at 03:12

## 2018-12-27 RX ADMIN — TACROLIMUS 2 MG: 1 CAPSULE ORAL at 05:12

## 2018-12-27 RX ADMIN — MIRTAZAPINE 7.5 MG: 7.5 TABLET ORAL at 09:12

## 2018-12-27 RX ADMIN — TACROLIMUS 2 MG: 1 CAPSULE ORAL at 09:12

## 2018-12-27 RX ADMIN — Medication 12.5 MG: at 09:12

## 2018-12-27 RX ADMIN — LIDOCAINE 1 PATCH: 50 PATCH CUTANEOUS at 03:12

## 2018-12-27 RX ADMIN — IPRATROPIUM BROMIDE AND ALBUTEROL SULFATE 3 ML: .5; 3 SOLUTION RESPIRATORY (INHALATION) at 03:12

## 2018-12-27 RX ADMIN — ASPIRIN 81 MG CHEWABLE TABLET 81 MG: 81 TABLET CHEWABLE at 09:12

## 2018-12-27 RX ADMIN — ISAVUCONAZONIUM SULFATE 372 MG: 186 CAPSULE ORAL at 09:12

## 2018-12-27 NOTE — PT/OT/SLP PROGRESS
Occupational Therapy   Treatment    Name: Femi Enciso  MRN: [id number]  Admitting Diagnosis:  S/P liver transplant  3 Days Post-Op    Recommendations:     Discharge Recommendations: nursing facility, skilled  Discharge Equipment Recommendations:  (tbd)  Barriers to discharge:  None    Subjective     Pain/Comfort:  · Pain Rating 1: 0/10  · Pain Rating Post-Intervention 1: 0/10    Objective:     Communicated with: RN prior to session.  Patient found with all lines intact, call button in reach and mother present and telemetry, NG tube upon OT entry to room.    General Precautions: Standard, fall   Orthopedic Precautions:N/A   Braces: N/A     Occupational Performance:    Pt refused all oob activities and ADL training. Pt requested OT to teach pt how to perform BUE exercises w/ theraband.    [facility] 6 Click ADL: 17    Treatment & Education:  OT educated pt on importance of oob activities, POC, and BUE HEP.  OT provided pt w/ theraband and HEP handout.  OT demo' d BUE HEP exercises and pt demo' d them back.     Patient left HOB elevated with all lines intact, call button in reach and mother present  Education:    Assessment:     Femi Enciso is a 53 y.o. male with a medical diagnosis of S/P liver transplant. Pt displayed global deconditioning requiring increased assist for ADLs and mobility at this time. Pt would benefit from skilled OT services to improve independence and overall occupational functioning.     He presents with the following performance deficits affecting function are weakness, impaired endurance, impaired self care skills, impaired functional mobilty, gait instability, decreased upper extremity function, impaired balance, impaired cardiopulmonary response to activity, orthopedic precautions, decreased ROM, decreased lower extremity function.     Rehab Prognosis:  Good; patient would benefit from acute skilled OT services to address these deficits and reach maximum level of function.       Plan:     Patient to be  seen 4 x/week to address the above listed problems via self-care/home management, therapeutic exercises, therapeutic activities, neuromuscular re-education  · Plan of Care Expires: 01/20/19  · Plan of Care Reviewed with: patient, mother    This Plan of care has been discussed with the patient who was involved in its development and understands and is in agreement with the identified goals and treatment plan    GOALS:   Multidisciplinary Problems     Occupational Therapy Goals        Problem: Occupational Therapy Goal    Goal Priority Disciplines Outcome Interventions   Occupational Therapy Goal     OT, PT/OT Ongoing (interventions implemented as appropriate)    Description:  Goals to be met by: 12/31/18 ( Goals revised: 12/18)    Patient will increase functional independence with ADLs by performing:    UE Dressing with Supervision.   LE Dressing with Minimal Assistance.( met with socks; continue with pants)  Grooming while standing at sink with Stand-by Assistance.  Toileting from toilet with Moderate Assistance for hygiene and clothing management.   Toilet transfer to toilet with moderate assistance.  Upper extremity  theraband exercise program x  15 reps  with independence. Met 12/9                       Multidisciplinary Problems (Resolved)        Problem: Occupational Therapy Goal    Goal Priority Disciplines Outcome Interventions   Occupational Therapy Goal   (Resolved)     OT, PT/OT Resolved for Upgrade                    Time Tracking:     OT Date of Treatment: 12/27/18  OT Start Time: 1443  OT Stop Time: 1458  OT Total Time (min): 15 min    Billable Minutes:Therapeutic Activity 15 minutes    Tristan Spencer OT  12/27/2018

## 2018-12-27 NOTE — ASSESSMENT & PLAN NOTE
- Dietician following. Severe temporal, clavicle muscle depletion noted. Severe subq fat loss  - Nutrition has been poor over the past 24 hours.   - Discussed at length with patient and caregivers that if patient's nutrition status does not improve, will need supplemental nutrition via tube feeds or TPN.  - Appetite stimulant started 12/19.   - Caregivers to bring in outside food for patient.   - Continue calorie count.  - EGD Monday 12/24 as pt c/o poor appetite + burning in chest/esophagus and occasionally vomiting after eating x several weeks.   - EGD 12/24 unremarkable. prabhakar tube placed afterwards.   - TF started for nutrition 12/24/18.   - Prealbumin 7 on 12/26.

## 2018-12-27 NOTE — PLAN OF CARE
Problem: Occupational Therapy Goal  Goal: Occupational Therapy Goal  Goals to be met by: 12/31/18 ( Goals revised: 12/18)    Patient will increase functional independence with ADLs by performing:    UE Dressing with Supervision.   LE Dressing with Minimal Assistance.( met with socks; continue with pants)  Grooming while standing at sink with Stand-by Assistance.  Toileting from toilet with Moderate Assistance for hygiene and clothing management.   Toilet transfer to toilet with moderate assistance.  Upper extremity  theraband exercise program x  15 reps  with independence. Met 12/9            Continue OT POC.     Comments: Tristan Spencer OTR/L  12/27/2018

## 2018-12-27 NOTE — ASSESSMENT & PLAN NOTE
- Pt remains significantly debilitated.   - PT/OT for strengthening.   - Currently will need SNF for placement and further rehabilitation.   - Pt remains severely debilitated and not strong enough for SNF at this time.    - SNF consult placed 12/17. However, denied for SNF again as not strong enough at this time.  - Rehab consulted.  We now have a contract w [facility] for rehab.  Rehab willing to accept patient when medically appropriate.    - Continue aggressive therapy.

## 2018-12-27 NOTE — ASSESSMENT & PLAN NOTE
- Last liver US 11/27. Evolving peritransplant hematomas with anechoic fluid collection adjacent to the right hepatic lobe.  Small volume perihepatic free fluid.  - Chest/Abd/pelvis CT 12/4- no fluid to be drainged per transplant surgeon.  No source of bleeding.    - Last transfused 12/26 w HD w appropriate response.  Monitor with daily labs.

## 2018-12-27 NOTE — PLAN OF CARE
Problem: Patient Care Overview  Goal: Plan of Care Review  Patient was evaluated by Speech, no abnormal findings. TF was increased to 50 ml/hr, tolerating well. UO 100ml, 1 BM today. No c/o nausea or pain. VS stable. Had a shower. Worked with PT and OT. Refuses I/S.

## 2018-12-27 NOTE — PROGRESS NOTES
[facility]  Physical Medicine & Rehab  Progress Note    Patient Name: Femi Enciso  MRN: [id number]  Admission Date: 10/27/2018  Length of Stay: 61 days  Attending Physician: Femi Patel MD    Subjective:     Principal Problem:S/P liver transplant    Hospital Course:   11/21/2018: Evaluated by therapy.  Bed mobility MaxA.  Sit to stand ModA x 2 ppl.  Ambulated 1 step MaxA.  UBD/LBD/toileting MaxA. Grooming Julito.  12/19/2018: Participated with therapy.  Bed mobility Julito.  Sit to stand Min-ModA and transfers ModA x 2 ppl.  No gait 2/2 nausea and dizziness during steps for trx.  UBD Julito and feeding (I).   12/20/2018: Participated with OT.  Sit to stand ModA x 2 ppl. No gait 2/2 anxiety.  Grooming Mod (I).   12/21/2018: Participated with PT.  Bed mobility Julito.  Sit to stand and transfers modA.  Ambulated ~3 steps modA.   12/24/2018: Participated with therapy.  Bed mobility CGA.  Sit to stand Min-ModA.  Ambulated-limited steps Min-MaxA x 2.  UBD Julito.  12/26/18: No therapy 2/2 dialysis.    Interval History 12/27/2018:  Patient is seen for follow-up rehab evaluation and recommendations: Quentin tube in place with TFs infusing. Patient with more energy.     HPI, Past Medical, Family, and Social History remains the same as documented in the initial encounter.    Scheduled Medications:    aspirin  81 mg Oral Daily    epoetin sherlyn (PROCRIT) injection  50 Units/kg (Dosing Weight) Intravenous Every Mon, Wed, Fri    ergocalciferol  50,000 Units Oral Q7 Days    escitalopram oxalate  20 mg Oral Daily    heparin (porcine)  5,000 Units Subcutaneous Q8H    isavuconazonium sulfate  372 mg Oral Daily    lidocaine  1 patch Transdermal Q24H    metoprolol  12.5 mg Per NG tube BID    mirtazapine  7.5 mg Oral QHS    pantoprazole  40 mg Intravenous BID    sulfamethoxazole-trimethoprim 400-80mg  1 tablet Oral Every Mon, Wed, Fri    tacrolimus  2 mg Sublingual BID    ursodiol  300 mg Oral BID    valganciclovir  50 mg/ml  100 mg Oral Once per day on Mon Wed Fri       Diagnostic Results: Labs: Reviewed    PRN Medications: sodium chloride, sodium chloride 0.9%, acetaminophen, albuterol-ipratropium, artificial tears, bacitracin, bisacodyl, dextrose 50%, dextrose 50%, glucagon (human recombinant), glucose, glucose, heparin (porcine), hydrALAZINE, midodrine, ondansetron, ramelteon, tiZANidine, traMADol    Review of Systems   Constitutional: Positive for activity change and fatigue. Negative for fever.   HENT: Negative for trouble swallowing and voice change.    Respiratory: Negative for cough and shortness of breath.    Cardiovascular: Negative for chest pain and palpitations.   Gastrointestinal: Positive for abdominal distention, abdominal pain and nausea. Negative for diarrhea.   Musculoskeletal: Positive for gait problem. Negative for arthralgias.   Skin: Positive for wound (surgical). Negative for color change.   Neurological: Positive for dizziness and weakness.     Objective:     Vital Signs (Most Recent):  Temp: 98.8 °F (37.1 °C) (12/27/18 1132)  Pulse: 89 (12/27/18 0715)  Resp: (!) 22 (12/27/18 0715)  BP: 128/86 (12/27/18 0715)  SpO2: 100 % (12/27/18 0715)    Vital Signs (24h Range):  Temp:  [97.9 °F (36.6 °C)-98.9 °F (37.2 °C)] 98.8 °F (37.1 °C)  Pulse:  [] 89  Resp:  [20-26] 22  SpO2:  [98 %-100 %] 100 %  BP: (111-128)/(70-86) 128/86     Physical Exam   Constitutional: He is oriented to person, place, and time. He appears well-developed and well-nourished.   Appears with more energy    HENT:   Head: Normocephalic and atraumatic.   Eyes: Right eye exhibits no discharge. Left eye exhibits no discharge. No scleral icterus.   Neck: Neck supple.   Cardiovascular: Normal rate and intact distal pulses.   Pulmonary/Chest: Effort normal. No respiratory distress.   Abdominal: Soft. He exhibits no distension.   Dawes tube in place   Musculoskeletal: He exhibits no edema or deformity.   RUE: 5/5.  LUE: 5/5.  RLE: 3/5.  LLE:  3/5.   Neurological: He is alert and oriented to person, place, and time. No sensory deficit.   Skin: Skin is warm and dry.   Psychiatric: He has a normal mood and affect. His behavior is normal.     Assessment/Plan:      * S/P liver transplant    -s/p liver trx on 11/11 for alcoholic cirrhosis       Impaired functional mobility and endurance    -2/2 deconditioning from liver trx     See hospital course for functional, cognitive/speech/language, and nutrition/swallow status.      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    -Repeat US pending 12/27-12/31 to assess hepatic artery stenosis      Severe malnutrition    -see decreased appetite     Decreased appetite    -  Decreased PO intake  - complaining of nausea & abd pain while eating   -  s/p EGD 12/24->antrum and duodenal bulb bx  -  prabhakar tube placed afterwards-->TF started for nutrition, now pain and burning when swallowing per nursing   -  Appears more energetic   -  Prealbumin 7 on 12/26       Acute renal failure with acute tubular necrosis superimposed on stage 3 chronic kidney disease    -on HD     Intra-abdominal abscess    -s/p drain, now removed   -IV abx completed         Pleural effusion associated with hepatic disorder    -last CXR on 12/19 with small left pleural effusion may be present  -on RA  -completed IV abx     Munson tube in place, but awaiting final nutrition plan.  Will follow progress and discuss with rehab team for post  acute care/rehab recommendation.      Mela Scanlon NP  Department of Physical Medicine & Rehab   [facility]

## 2018-12-27 NOTE — PLAN OF CARE
Problem: SLP Goal  Goal: SLP Goal  Speech-Language Pathology Goals  Goals to be met by 12/3/18  1. Patient will tolerate REGULAR DIET/THIN LIQUIDS with no s/s of aspiration.  2. Educate patient and family regarding risks/warning signs of aspiration.    Patient seen for bedside swallow eval. SLP recommending continued REGULAR DIET/THIN LIQUIDS.     Sid Hernandez -SLP  Speech-Language Pathology  Pager: [phone]

## 2018-12-27 NOTE — PT/OT/SLP EVAL
"Speech Language Pathology Evaluation  Bedside Swallow    Patient Name:  Femi Enciso   MRN:  [id number]  Admitting Diagnosis: S/P liver transplant    Recommendations:                 General Recommendations:  Follow-up not indicated  Diet recommendations:  Regular, Thin   Aspiration Precautions: Standard aspiration precautions   General Precautions: Standard, fall  Communication strategies:  none    History:     Past Medical History:   Diagnosis Date    Liver cirrhosis, alcoholic 09/30/2018       Past Surgical History:   Procedure Laterality Date    EGD (ESOPHAGOGASTRODUODENOSCOPY) N/A 12/24/2018    Performed by Duarte Jules [facility] at [facility] ENDO (2ND FLR)    EGD (ESOPHAGOGASTRODUODENOSCOPY) N/A 11/6/2018    Performed by Duarte Jules [facility] at [facility] ENDO (2ND FLR)    INSERTION, CATHETER, CENTRAL VENOUS, HEMODIALYSIS, TUNNELED N/A 11/7/2018    Performed by Brock Gonzalez MD at [facility] CATH LAB    LAPAROTOMY, EXPLORATORY N/A 11/16/2018    Performed by Amadou Lester Jr., MD at [facility] OR [facility] FLR    LAPAROTOMY, EXPLORATORY, AFTER LIVER TRANSPLANT N/A 11/16/2018    Performed by Amadou Lester Jr., MD at [facility] FLR    LAPAROTOMY, EXPLORATORY, AFTER LIVER TRANSPLANT, LIKELY  ABDOMINAL CLOSURE N/A 11/18/2018    Performed by Amadou Lester Jr., MD at [facility] FLR    TRANSPLANT, LIVER N/A 11/11/2018    Performed by Shmuel Leary MD at [facility] OR [address] [place] [address]     Modified Barium Swallow: None on file    Chest X-Rays 12/20/18: Left hemidiaphragmatic margin is better delineated on the current examination than on 12/19/2018, consistent with improved aeration in the left lower lobe.  There has been no significant detrimental interval change in the appearance of the chest since that time, with the current examination demonstrating a slightly improved inspiratory depth level.  No pneumothorax.    Prior diet: regular/thin.    Subjective     Patient alert and cooperative during session  "The doctors think this is all in " "my head."   Communicated with nurse prior to session    Pain/Comfort:  · Pain Rating 1: 0/10  · Pain Rating Post-Intervention 1: 0/10    Objective:     Oral Musculature Evaluation  · Oral Musculature: WFL  · Dentition: present and adequate  · Mucosal Quality: adequate  · Mandibular Strength and Mobility: WFL  · Oral Labial Strength and Mobility: WFL  · Lingual Strength and Mobility: WFL  · Buccal Strength and Mobility: WFL  · Volitional Cough: strong  · Volitional Swallow: timely; good laryngeal elevation  · Voice Prior to PO Intake: clear; good intensity    Bedside Swallow Eval:   Consistencies Assessed:  · Thin liquids x5 (sips from water bottle)  · Solids x4 (pieces of cereal)   · **Patient self-fed all consistencies    Oral Phase:   · WFL    Pharyngeal Phase:   · no overt clinical signs/symptoms of aspiration  · no overt clinical signs/symptoms of pharyngeal dysphagia    Compensatory Strategies  · None    Treatment: Patient seen for bedside swallow eval. He was sitting up in bed with NG in place and mother in room. Patient reported no difficulties with mastication or deglutition, but he reported nausea this morning while he was chewing a piece of chisholm. During session, patient tolerated all consistencies with no overt signs of aspiration. Oral and pharyngeal stages of the swallow were timely and efficient. He declined further PO trials 2' to concern about nausea. SLP educated patient regarding risks/warning signs of aspiration. SLP will f/u with patient during meal tomorrow to further assess diet tolerance. Whiteboard updated. Patient left in bed with call light within reach. SLP discussed diet recs with RN.     Assessment:     Femi Enciso is a 53 y.o. male with a safe oropharyngeal swallow at this time.    Goals:   Multidisciplinary Problems     SLP Goals        Problem: SLP Goal    Goal Priority Disciplines Outcome   SLP Goal   (Resolved)     SLP Resolved for Upgrade   Description:  Speech Language Pathology " Goals  Goals expected to be met by 11/30/2018  1. Pt will participate in ongoing swallow assessment MET  2. Pt will tolerate regular diet with thin liquids, MOD I MET  3. Educate Pt and family on S/S aspiration                   Problem: SLP Goal    Goal Priority Disciplines Outcome   SLP Goal     SLP    Description:  Speech-Language Pathology Goals  Goals to be met by 12/3/18  1. Patient will tolerate REGULAR DIET/THIN LIQUIDS with no s/s of aspiration.  2. Educate patient and family regarding risks/warning signs of aspiration.                    Plan:     · Patient to be seen:  4 x/week   · Plan of Care expires:  01/26/19  · Plan of Care reviewed with:  patient, mother   · SLP Follow-Up:  Yes       Discharge recommendations:  (further ST not recommended)   Barriers to Discharge:  None    Time Tracking:     SLP Treatment Date:   12/27/18  Speech Start Time:  1147  Speech Stop Time:  1156     Speech Total Time (min):  9 min    Billable Minutes: Eval Swallow and Oral Function 9    Sid Hernandez CF-SLP  Speech-Language Pathology  Pager: [phone]   12/27/2018

## 2018-12-27 NOTE — ASSESSMENT & PLAN NOTE
-2/2 deconditioning from liver trx     See hospital course for functional, cognitive/speech/language, and nutrition/swallow status.      Recommendations  -  Encourage mobility, OOB in chair at least 3 hours per day, and early ambulation as appropriate  -  PT/OT evaluate and treat  -  Pain management  -  Monitor for and prevent skin breakdown and pressure ulcers  · Early mobility, repositioning/weight shifting every 20-30 minutes when sitting, turn patient every 2 hours, proper mattress/overlay and chair cushioning, pressure relief/heel protector boots  -  DVT prophylaxis:  [facility]  -  Reviewed discharge options (IP rehab, SNF, HH therapy, and OP therapy)

## 2018-12-27 NOTE — PROGRESS NOTES
[facility]  Liver Transplant  Progress Note    Patient Name: Femi Enciso  MRN: [id number]  Admission Date: 10/27/2018  Hospital Length of Stay: 61 days  Code Status: Full Code  Primary Care Provider: Primary Doctor No  Post-Operative Day: 46    ORGAN:   LIVER  Disease Etiology: Alcoholic Cirrhosis  Donor Type:    - Brain Death  CDC High Risk:   No  Donor CMV Status:   Donor CMV Status: Positive  Donor HBcAB:   Negative  Donor HCV Status:   Negative  Donor HBV JAVIER: Negative  Donor HCV JAVIER: Negative  Whole or Partial: Whole Liver  Biliary Anastomosis: End to End  Arterial Anatomy: Standard  Subjective:     History of Present Illness:  Femi Enciso is a 53yr old male with PMH of acute ETOH hepatitis and DEL/ATN evolved to ESRD requiring HD (not candidate for KTX). He is now s/p liver transplant (SM induction, DBD, CMV D+/R-). Transplant surgery noted severe collateralization, adrenal gland firmly adhered to liver. Bare area of adrenal gland required several stitches and packing to obtain fair hemostasis (EBL 15L). OR take back  for bleeding from R adrenal bed in AM (significant clot in retroperitoneum, posterior to R hepatic lobe, tract posterior to the R kidney containing significant clot, small bleeding area of retroperitoneal fat) then returned to surgery same day for hemorrhaging closed with wound vac with plans to return to OR 24-48 hours for closure (retroperitoneal /retrorenal/retrocolic hematoma on the right ). Raw retroperitoneal bleeding. Suture ligatures placed, argon beam cautery, evarest placed in retroperitoneum behind cava and right kidney. Significant oozing from upper right adrenal gland, not amenable to ligation, that portion of the adrenal gland was resected with cautery). OR take back  for washout and incisional closure, no significant bleeding or hematoma found. OR cultures   from body fluid grew enterococcus faecium VRE. ID was consulted,  started on  daptomycin for VRE treatment and cefepime/flagyl to cover for IA ty in bile. Patient with significant leukocytosis with peak on 11/22 at 48K prompting drainage of R subphrenic fluid collection, perc drain placed, culture grew VRE. Stroke code called 11/21 for lethargy and unresponsive, CT head without evidence of acute ischemic changes and CTA chest negative, vascular neurology was consulted recommended MRI brain, but patient's AMS improved shortly after event. Nephrology consulted for ESRD requiring HD. Patient overall improved on antibiotic regimen, leukocytosis and AMS improved. He was transferred to TSU on POD #15.     Hospital Course:  Pt c/o CP 12/21. EKG stable from prior. Troponin wnl. CP resolved 12/22. CXR 12/20 showed no detrimental change. Pt hypertensive prior to HD.  Dialysis tolerated well 12/21 with 1.5L taken off. Bps much improved after dialysis, but monitoring closely.    Interval History:  No acute events overnight. Pt tolerated HD- 2L removed. PRBC given w HD w good response. LFTs stable. [place] tube in place with tube feedings at goal rate, increased to 45 ml/hr.  Encouraged pt to increase PO intake.  Prealbumin 7, 12/26. Continue TFs. Pt participating with PT/OT.  Rehab following, will accept pt when medically appropriate and w/o JHON.  Monitor.     Scheduled Meds:   aspirin  81 mg Oral Daily    epoetin sherlyn (PROCRIT) injection  50 Units/kg (Dosing Weight) Intravenous Every Mon, Wed, Fri    ergocalciferol  50,000 Units Oral Q7 Days    escitalopram oxalate  20 mg Oral Daily    heparin (porcine)  5,000 Units Subcutaneous Q8H    isavuconazonium sulfate  372 mg Oral Daily    lidocaine  1 patch Transdermal Q24H    metoprolol  12.5 mg Per NG tube BID    mirtazapine  7.5 mg Oral QHS    pantoprazole  40 mg Intravenous BID    sulfamethoxazole-trimethoprim 400-80mg  1 tablet Oral Every Mon, Wed, Fri    tacrolimus  2 mg Sublingual BID    ursodiol  300 mg Oral BID    valganciclovir 50 mg/ml   100 mg Oral Once per day on Mon Wed Fri     Continuous Infusions:  PRN Meds:sodium chloride, sodium chloride 0.9%, acetaminophen, albuterol-ipratropium, artificial tears, bacitracin, bisacodyl, dextrose 50%, dextrose 50%, glucagon (human recombinant), glucose, glucose, heparin (porcine), hydrALAZINE, midodrine, ondansetron, ramelteon, tiZANidine, traMADol    Review of Systems   Constitutional: Positive for activity change and appetite change (decreased). Negative for fatigue and fever.   HENT: Negative for congestion, facial swelling, sore throat and voice change.    Eyes: Negative for pain, discharge and visual disturbance.   Respiratory: Negative for apnea, cough, choking, chest tightness, shortness of breath and wheezing.    Cardiovascular: Negative for chest pain, palpitations and leg swelling.   Gastrointestinal: Positive for nausea. Negative for abdominal distention, abdominal pain, constipation, diarrhea and vomiting.   Endocrine: Negative.    Genitourinary: Positive for decreased urine volume. Negative for difficulty urinating, dysuria, hematuria and urgency.   Musculoskeletal: Negative.  Negative for back pain, gait problem, neck pain and neck stiffness.   Skin: Positive for wound. Negative for color change and pallor.   Allergic/Immunologic: Positive for immunocompromised state.   Neurological: Positive for weakness. Negative for dizziness, seizures, light-headedness and headaches.   Psychiatric/Behavioral: Negative for behavioral problems, confusion, decreased concentration, dysphoric mood, hallucinations, sleep disturbance and suicidal ideas. The patient is not nervous/anxious.      Objective:     Vital Signs (Most Recent):  Temp: 98.8 °F (37.1 °C) (12/27/18 1132)  Pulse: 87 (12/27/18 1145)  Resp: 20 (12/27/18 1145)  BP: 116/78 (12/27/18 1145)  SpO2: 98 % (12/27/18 1145) Vital Signs (24h Range):  Temp:  [98.3 °F (36.8 °C)-98.9 °F (37.2 °C)] 98.8 °F (37.1 °C)  Pulse:  [82-89] 87  Resp:  [20-26] 20  SpO2:   [98 %-100 %] 98 %  BP: (111-128)/(78-86) 116/78     Weight: 65.8 kg (145 lb 1 oz)  Body mass index is 20.81 kg/m².    Intake/Output - Last 3 Shifts       12/25 0700 - 12/26 0659 12/26 0700 - 12/27 0659 12/27 0700 - 12/28 0659    P.O. 0 100 0    I.V. (mL/kg) 100 (1.5)      Blood  300     Other  800     NG/GT 1110 880 225    Total Intake(mL/kg) 1210 (18.2) 2080 (31.6) 225 (3.4)    Urine (mL/kg/hr) 325 (0.2) 120 (0.1) 100 (0.2)    Other  2800     Stool 0 0 0    Total Output 325 2920 100    Net +885 -840 +125           Urine Occurrence 1 x 0 x     Stool Occurrence 2 x 0 x 1 x          Physical Exam   Constitutional: He is oriented to person, place, and time. He appears well-developed. No distress.   Temporal and distal extremity muscle wasting noted.   HENT:   Head: Normocephalic and atraumatic.   Mouth/Throat: Oropharynx is clear and moist. No oropharyngeal exudate.   Eyes: EOM are normal.   Neck: Normal range of motion. Neck supple. No JVD present.   Cardiovascular: Normal rate, regular rhythm, normal heart sounds and intact distal pulses.   No murmur heard.  Pulmonary/Chest: Effort normal. No respiratory distress. He has decreased breath sounds in the right lower field and the left lower field. He has no wheezes. He exhibits no tenderness.   Abdominal: Soft. Bowel sounds are normal. He exhibits no distension. There is no tenderness.   Chevron inc clean, dry, intact with steri strips in place     Musculoskeletal: Normal range of motion. He exhibits no edema or tenderness.   Neurological: He is alert and oriented to person, place, and time. He has normal reflexes.   Skin: Skin is warm and dry. Capillary refill takes 2 to 3 seconds. He is not diaphoretic. No pallor.   Psychiatric: He has a normal mood and affect. His behavior is normal. Thought content normal. His affect is not labile. He is not slowed. Cognition and memory are not impaired.   Nursing note and vitals reviewed.      Laboratory:  Immunosuppressants         " Stop Route Frequency     tacrolimus capsule 2 mg      -- SL 2 times daily        CBC:   Recent Labs   Lab 12/27/18  0648   WBC 11.73   RBC 3.30*   HGB 9.5*   HCT 30.3*      MCV 92   MCH 28.8   MCHC 31.4*     CMP:   Recent Labs   Lab 12/27/18  0648   *   CALCIUM 9.1   ALBUMIN 2.6*   PROT 6.2      K 3.6   CO2 27      BUN 17   CREATININE 1.5*   ALKPHOS 142*   ALT 10   AST 11   BILITOT 1.1*     Labs within the past 24 hours have been reviewed.    Diagnostic Results:  I have personally reviewed all pertinent imaging studies.    Assessment/Plan:     * S/P liver transplant    - LFTs now improving slowly.  T bili was 37.6 day of transplant.  - Drain placed during take back. Drain placed to intra-abdominal abscess on 11/22.   - Fluid from collection noted with enterococcus VRE completed Zyvox treatment.  - Routine 1 month Liver US 12/17 which showed elevated velocity of hepatic artery and low RIs.   - Vascular Surgery consulted. Recommend repeat US in 10-14 days (12/27-12/31) to reassess. Appreciate Vascular recs.  LFTs stable.     Chest pain    - Pt c/o "burning" diffuse CP 12/21 AM.  - EKG NSR, stable from prior.  - Troponin wnl.  - Likely GI related pain.        Hypophosphatemia    - Continue to monitor.         Acute kidney failure with lesion of tubular necrosis    - Cont Dialysis MWF.  - Nephrology following, appreciate recs.  - Monitor.     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       Hypomagnesemia    - Continue to monitor & replace as needed.     Severe malnutrition    - Dietician following. Severe temporal, clavicle muscle depletion noted. Severe subq fat loss  - Nutrition has been poor over the past 24 hours.   - Discussed at length with patient and caregivers that if patient's nutrition status does not improve, will need supplemental nutrition via tube feeds or TPN.  - Appetite stimulant started 12/19.   - Caregivers to bring in " outside food for patient.   - Continue calorie count.  - EGD Monday 12/24 as pt c/o poor appetite + burning in chest/esophagus and occasionally vomiting after eating x several weeks.   - EGD 12/24 unremarkable. jhon tube placed afterwards.   - TF started for nutrition 12/24/18.   - Prealbumin 7 on 12/26.     Nausea    - Intermittent, worse over past 10 days,  Changed Pepcid to Protonix 12/9/18.  Appetite seems to be slowly improving.    - Encourage multiple, small meals and cont PRN anti-emetics.    - GI consulted.  EGD 12/24 w normal esophagus, erythematous mucosa in the antrum and nodular mucosa in the duodenal bulb w biopsies.  Path pending.  - JHON placed 12/24.  Pt tolerating tube feeds.  Denies nausea w TF.    - GI symptoms now slowly improving.        Anxiety    - Restarted Lexapro as per psych recs, 12/13.   - Monitor.     Delirium    - Pt with intermittent confusion since transplant was improving slowly.   - Pt with some lethargy and confusion on 11/21. Head CT negative.   - AAOx3. More alert and awake on 11/27.   - AAOx4 on 11/29. Seroquel d/c'd 11/30/18.  Trazodone for insomnia ordered w PRN dose.    - Pt then with delirium again. re consulted psych.   - delirium could be d/t rising prograf.  AMS improved since holding Prograf.    - CT head 12/4 with no acute findings.   - Restarted Prograf 12/6- will need to monitor mental status closely.  - 12/10 delirium resolved.     Prolonged Q-T interval on ECG    -  ms on EKG 12/19.  -  on EKG 12/21.  - Monitor.       Alcohol use disorder, severe, in early remission, dependence    - Monitor.       Decreased appetite    - Continue appetite stimulant.  - GI consulted as pt c/o burning sensation in chest/esophagus + vomiting after eating, passed SLP eval, recommended GI f/u.  - Cont to encourage PO intake. Ordered additional supplements.   - EGD , 12/24 unremarkable. jhon tube placed and TF started. Increased TF to 45 ml, new goal rate 50 ml/hr.  -  Prealbumin 7 on 12/26.     Acute renal failure with acute tubular necrosis superimposed on stage 3 chronic kidney disease    - 2 weeks for DEL prior to transplant.  - Renal function slow to recover post-op.   - Nephrology consulted for management.   - Cont with HD as per nephrology recs.  Apprec recs.    - Lasix 160 mg challenge 11/28 w/ minimal output.    - HD MWF. Tolerated well 12/26.  Wait to dialyze tomorrow.    - Strict I&Os.      Intra-abdominal abscess    - Significant increase in WBC post-op.   - OR cultures from 11/18 noted with enterococcus VRE.   - Abdominal CT performed at that time with fluid collection and drain placed on 11/22 for drainage.   - Intra-abdominal abscess culture also with enterococcus VRE.   - ID consulted and pt placed on antibxs for treatment. Pt was on Cefepime, Daptomycin, and Fluconazole for treatment.    - Pt remains with RLQ drains (one JOSE A and one Percutaneous).  One JOSE A removed 11/28.  Perc drain in placed draining tan, clear drainage.  WBC slowly improving.   - Liver US on 11/26 reviewed.   - Abdominal CT performed 11/27 and reviewed. Fluid collection noted with improvement on CT.  ID following and reassured by findings.    - Dapto, Cefepime and Flagyl changed 11/30/18 to Linezolid 600 mg PO q 12 hr x 10 days per ID.     - Added back cefepime after thora.  ID re consulted.  - Completed Zyvox x 10 days per ID thru 12/9/18. Monitor.        Long-term use of immunosuppressant medication    - Cont with prograf. Draw prograf level daily and adjust dose as needed to maintain a therapeutic level.        At risk for opportunistic infections    - Cont with bactrim and valcyte for protection against opportunistic infections.   - Cont Isavuconazonium for fungal prophylaxis.     Leukocytosis    - See intra-abdominal abscess.  - wbc improving, cont w delirium.    - Repeat cx 12/4 with NGTD.  Completed Linezolid and Cefepime per ID.    - Wbc trended up 12/11- repeat cx 12/11 w NGTD.  - WBC  count improving.  No signs of infection.       Adrenal cortical steroids causing adverse effect in therapeutic use    - Monitor.       Prophylactic immunotherapy    - See long term immunosuppression.        Weakness    - Pt remains significantly debilitated.   - PT/OT for strengthening.   - Currently will need SNF for placement and further rehabilitation.   - Pt remains severely debilitated and not strong enough for SNF at this time.    - SNF consult placed 12/17. However, denied for SNF again as not strong enough at this time.  - Rehab consulted.  We now have a contract w [facility] for rehab.  Rehab willing to accept patient when medically appropriate.    - Continue aggressive therapy.     Pleural effusion associated with hepatic disorder    - c/o increased pain w deep breath today to right side.  CXR showed increased opacity in the inferior hemothorax on the right side since 11/26/2018.  Pulse ox 97-99% on RA.  Cont to monitor.   - continues to have fluid to RLL.  WBC remains elevated.    - thoracentesis performed on 11/30. Fluid noted with 4k WBC, 93 SEGS. cx no growth to date.  Cefepime added for treatment.  - Chest CT showing right pleural effusion improved, left w increased pleural effusion.   - Per ID, completed treatment of empyema w Cefepime thru 12/8.  - sats remain 97-99% on RA.  - CXR 12/20 showed left hemidiaphragmatic margin that is better delineated than on 12/19/2018, consistent with improved aeration in the left lower lobe. + no significant detrimental interval change in the appearance of the chest, with the current examination demonstrating a slightly improved inspiratory depth level.    - denies SOB.  Last CXR 12/22 w improvement.     Type 2 diabetes mellitus without complication    - Endocrine consulted for management. Appreciate recs.   - Hypoglycemia 12/10 requiring D50.    - Repeat cx 12/11 - no growth  - Blood glucose readings well controlled on tube feeds.     Anemia of chronic disease    - Last  liver US 11/27. Evolving peritransplant hematomas with anechoic fluid collection adjacent to the right hepatic lobe.  Small volume perihepatic free fluid.  - Chest/Abd/pelvis CT 12/4- no fluid to be drainged per transplant surgeon.  No source of bleeding.    - Last transfused 12/26 w HD w appropriate response.  Monitor with daily labs.          VTE Risk Mitigation (From admission, onward)        Ordered     heparin (porcine) injection 5,000 Units  Every 8 hours      12/24/18 0742     heparin (porcine) injection 1,000 Units  As needed (PRN)      12/12/18 1731     IP VTE HIGH RISK PATIENT  Once      11/11/18 1208          The patients clinical status was discussed at multidisplinary rounds, involving transplant surgery, transplant medicine, pharmacy, nursing, nutrition, and social work    Discharge Planning:  No plan for discharge    Hilary Galarza NP  Liver Transplant  [facility]

## 2018-12-27 NOTE — ASSESSMENT & PLAN NOTE
- Endocrine consulted for management. Appreciate recs.   - Hypoglycemia 12/10 requiring D50.    - Repeat cx 12/11 - no growth  - Blood glucose readings well controlled on tube feeds.

## 2018-12-27 NOTE — PLAN OF CARE
Problem: Patient Care Overview  Goal: Plan of Care Review  Patient went to HD today, -2L. Received 1 unit PRBC. ST attempted eval, pt was off floor, she did not return. No other changes. C/o sore throat, spray at bedside. C/o feeling hungry but did not attempt to eat, requested that TF be increased.

## 2018-12-27 NOTE — PLAN OF CARE
Problem: Patient Care Overview  Goal: Plan of Care Review  Outcome: Ongoing (interventions implemented as appropriate)  Pt aao x 4. VSS. Bed in low locked pos call light within reach. Pt calls for assistance when needed. TF JHON at goal rate. Meds crushed and given via JHON.  Bs monitoted ac/hs, ss insulin given as ordered.HD done MWF, pt oliguric.

## 2018-12-27 NOTE — ASSESSMENT & PLAN NOTE
- See intra-abdominal abscess.  - wbc improving, cont w delirium.    - Repeat cx 12/4 with NGTD.  Completed Linezolid and Cefepime per ID.    - Wbc trended up 12/11- repeat cx 12/11 w NGTD.  - WBC count improving.  No signs of infection.

## 2018-12-27 NOTE — ASSESSMENT & PLAN NOTE
- Continue appetite stimulant.  - GI consulted as pt c/o burning sensation in chest/esophagus + vomiting after eating, passed SLP eval, recommended GI f/u.  - Cont to encourage PO intake. Ordered additional supplements.   - EGD , 12/24 unremarkable. prabhakar tube placed and TF started. Increased TF to 45 ml, new goal rate 50 ml/hr.  - Prealbumin 7 on 12/26.

## 2018-12-27 NOTE — ASSESSMENT & PLAN NOTE
-  Decreased PO intake  - complaining of nausea & abd pain while eating   -  s/p EGD 12/24->antrum and duodenal bulb bx  -  prabhakar tube placed afterwards-->TF started for nutrition, now pain and burning when swallowing per nursing   -  Appears more energetic   -  Prealbumin 7 on 12/26

## 2018-12-27 NOTE — PT/OT/SLP PROGRESS
Physical Therapy Treatment    Patient Name:  Femi Enciso   MRN:  [id number]    Recommendations:     Discharge Recommendations:  nursing facility, skilled   Discharge Equipment Recommendations: (TBD)   Barriers to discharge: Inaccessible home and Decreased caregiver support    Assessment:     Femi Enciso is a 53 y.o. male admitted with a medical diagnosis of S/P liver transplant.  He presents with the following impairments/functional limitations:  weakness, impaired self care skills, impaired functional mobilty, impaired endurance, gait instability, impaired balance, decreased lower extremity function, decreased upper extremity function, pain, decreased safety awareness.  Pt tolerated session c c/o weakness and nausea following session.  Pt motivated on this date and did not require much encouragement for participation.  Performed bed mobility c CGA, transfer c min A and gait c min A using HHAx2.  Pt demo improvements in transfer - requiring less assistance and cueing for proper hand placement.  Pt ambulate short distance (3ft x2) to bedside chair and demo decreased gait speed, flat foot contact, mild unsteadiness, 2x L knee buckling but able to correct, min c/o BLE weakness.  Pt safe to transfer to/from bedside chair c assistance of 1x person. Pt expressed fear of falling c transferring c NSG - reassured pt that staff are trained and pt has strength to safely transfer to chair using stand pivot and chair next to bed.  Pt would benefit from continued skilled acute PT 4x/wk to improve functional mobility.      Rehab Prognosis: Good; patient would benefit from acute skilled PT services to address these deficits and reach maximum level of function.    Recent Surgery: Procedure(s) (LRB):  EGD (ESOPHAGOGASTRODUODENOSCOPY) (N/A) 3 Days Post-Op    Plan:     During this hospitalization, patient to be seen 4 x/week to address the identified rehab impairments via gait training, therapeutic activities, therapeutic exercises,  "neuromuscular re-education and progress toward the following goals:    · Plan of Care Expires:  01/17/19    Subjective     Chief Complaint: BLE weakness  Patient/Family Comments/goals: "I'm feeling better today." "I still feel the soreness in my legs today."  Pain/Comfort:  · Pain Rating 1: 0/10      Objective:     Communicated with RN prior to session.  Patient found HOB elevated telemetry, NG tube  upon PT entry to room.     General Precautions: Standard, fall   Orthopedic Precautions:N/A   Braces: N/A     Functional Mobility:  · Bed Mobility:     · Rolling Right: contact guard assistance  · Scooting: contact guard assistance  · Supine to Sit: contact guard assistance  · Sit to Supine: contact guard assistance  · Transfers:     · Sit to Stand:  minimum assistance with no AD  · Bed to Chair: minimum assistance with  hand-held assist  using  Step Transfer  · Gait: 3ft x2 c HHAx2 min A   · decreased gait speed, flat foot contact, mild unsteadiness, 2x L knee buckling but able to correct, min c/o BLE weakness.  · Balance: sitting (S); standing (CGA)      AM-PAC 6 CLICK MOBILITY  Turning over in bed (including adjusting bedclothes, sheets and blankets)?: 3  Sitting down on and standing up from a chair with arms (e.g., wheelchair, bedside commode, etc.): 3  Moving from lying on back to sitting on the side of the bed?: 3  Moving to and from a bed to a chair (including a wheelchair)?: 3  Need to walk in hospital room?: 2  Climbing 3-5 steps with a railing?: 1  Basic Mobility Total Score: 15       Therapeutic Activities and Exercises:  Pt educated on: progress; safety c mobility; benefits of OOB activities; performing therex; d/c recs - v/u  -Sit<>stand: 2x from bed; 3x from bedside chair  -Static standing 3x1min  -Weight shifting in standing 1x1min  -Standing marches 1x5 BLE  -Therex (LAQ,hip flex, modified sit ups) 1x15 ea  -Attempted to have pt sit up in chair following session but pt request to return to bed d/t c/o " nausea    Patient left HOB elevated with all lines intact, call button in reach, RN notified and mother present..    GOALS:   Multidisciplinary Problems     Physical Therapy Goals        Problem: Physical Therapy Goal    Goal Priority Disciplines Outcome Goal Variances Interventions   Physical Therapy Goal     PT, PT/OT Ongoing (interventions implemented as appropriate)     Description:  Goals to be met by: 2018     Patient will increase functional independence with mobility by performin. Supine to sit with Modified [place] -not met  2. Sit to stand transfer with [place] -not met  3. Bed to chair transfer with independence using no AD -not met  4. Gait  x150 feet with Supervision using LRAD. -not met  5. Lower extremity exercise program x20 reps per handout, with independence -not met                      Multidisciplinary Problems (Resolved)        Problem: Physical Therapy Goal    Goal Priority Disciplines Outcome Goal Variances Interventions   Physical Therapy Goal   (Resolved)     PT, PT/OT Resolved for Upgrade                     Time Tracking:     PT Received On: 18  PT Start Time: 1115     PT Stop Time: 1140  PT Total Time (min): 25 min     Billable Minutes: Therapeutic Activity 25 min    Treatment Type: Treatment  PT/PTA: PT     PTA Visit Number: 0     Percy Coleman, PT  2018

## 2018-12-27 NOTE — ASSESSMENT & PLAN NOTE
- 2 weeks for DEL prior to transplant.  - Renal function slow to recover post-op.   - Nephrology consulted for management.   - Cont with HD as per nephrology recs.  Apprec recs.    - Lasix 160 mg challenge 11/28 w/ minimal output.    - HD MWF. Tolerated well 12/26.  Wait to dialyze tomorrow.    - Strict I&Os.

## 2018-12-27 NOTE — ASSESSMENT & PLAN NOTE
- Intermittent, worse over past 10 days,  Changed Pepcid to Protonix 12/9/18.  Appetite seems to be slowly improving.    - Encourage multiple, small meals and cont PRN anti-emetics.    - GI consulted.  EGD 12/24 w normal esophagus, erythematous mucosa in the antrum and nodular mucosa in the duodenal bulb w biopsies.  Path pending.  - JHON placed 12/24.  Pt tolerating tube feeds.  Denies nausea w TF.    - GI symptoms now slowly improving.

## 2018-12-27 NOTE — SUBJECTIVE & OBJECTIVE
Scheduled Meds:   aspirin  81 mg Oral Daily    epoetin sherlyn (PROCRIT) injection  50 Units/kg (Dosing Weight) Intravenous Every Mon, Wed, Fri    ergocalciferol  50,000 Units Oral Q7 Days    escitalopram oxalate  20 mg Oral Daily    heparin (porcine)  5,000 Units Subcutaneous Q8H    isavuconazonium sulfate  372 mg Oral Daily    lidocaine  1 patch Transdermal Q24H    metoprolol  12.5 mg Per NG tube BID    mirtazapine  7.5 mg Oral QHS    pantoprazole  40 mg Intravenous BID    sulfamethoxazole-trimethoprim 400-80mg  1 tablet Oral Every Mon, Wed, Fri    tacrolimus  2 mg Sublingual BID    ursodiol  300 mg Oral BID    valganciclovir 50 mg/ml  100 mg Oral Once per day on Mon Wed Fri     Continuous Infusions:  PRN Meds:sodium chloride, sodium chloride 0.9%, acetaminophen, albuterol-ipratropium, artificial tears, bacitracin, bisacodyl, dextrose 50%, dextrose 50%, glucagon (human recombinant), glucose, glucose, heparin (porcine), hydrALAZINE, midodrine, ondansetron, ramelteon, tiZANidine, traMADol    Review of Systems   Constitutional: Positive for activity change and appetite change (decreased). Negative for fatigue and fever.   HENT: Negative for congestion, facial swelling, sore throat and voice change.    Eyes: Negative for pain, discharge and visual disturbance.   Respiratory: Negative for apnea, cough, choking, chest tightness, shortness of breath and wheezing.    Cardiovascular: Negative for chest pain, palpitations and leg swelling.   Gastrointestinal: Positive for nausea. Negative for abdominal distention, abdominal pain, constipation, diarrhea and vomiting.   Endocrine: Negative.    Genitourinary: Positive for decreased urine volume. Negative for difficulty urinating, dysuria, hematuria and urgency.   Musculoskeletal: Negative.  Negative for back pain, gait problem, neck pain and neck stiffness.   Skin: Positive for wound. Negative for color change and pallor.   Allergic/Immunologic: Positive for  immunocompromised state.   Neurological: Positive for weakness. Negative for dizziness, seizures, light-headedness and headaches.   Psychiatric/Behavioral: Negative for behavioral problems, confusion, decreased concentration, dysphoric mood, hallucinations, sleep disturbance and suicidal ideas. The patient is not nervous/anxious.      Objective:     Vital Signs (Most Recent):  Temp: 98.8 °F (37.1 °C) (12/27/18 1132)  Pulse: 87 (12/27/18 1145)  Resp: 20 (12/27/18 1145)  BP: 116/78 (12/27/18 1145)  SpO2: 98 % (12/27/18 1145) Vital Signs (24h Range):  Temp:  [98.3 °F (36.8 °C)-98.9 °F (37.2 °C)] 98.8 °F (37.1 °C)  Pulse:  [82-89] 87  Resp:  [20-26] 20  SpO2:  [98 %-100 %] 98 %  BP: (111-128)/(78-86) 116/78     Weight: 65.8 kg (145 lb 1 oz)  Body mass index is 20.81 kg/m².    Intake/Output - Last 3 Shifts       12/25 0700 - 12/26 0659 12/26 0700 - 12/27 0659 12/27 0700 - 12/28 0659    P.O. 0 100 0    I.V. (mL/kg) 100 (1.5)      Blood  300     Other  800     NG/GT 1110 880 225    Total Intake(mL/kg) 1210 (18.2) 2080 (31.6) 225 (3.4)    Urine (mL/kg/hr) 325 (0.2) 120 (0.1) 100 (0.2)    Other  2800     Stool 0 0 0    Total Output 325 2920 100    Net +885 -840 +125           Urine Occurrence 1 x 0 x     Stool Occurrence 2 x 0 x 1 x          Physical Exam   Constitutional: He is oriented to person, place, and time. He appears well-developed. No distress.   Temporal and distal extremity muscle wasting noted.   HENT:   Head: Normocephalic and atraumatic.   Mouth/Throat: Oropharynx is clear and moist. No oropharyngeal exudate.   Eyes: EOM are normal.   Neck: Normal range of motion. Neck supple. No JVD present.   Cardiovascular: Normal rate, regular rhythm, normal heart sounds and intact distal pulses.   No murmur heard.  Pulmonary/Chest: Effort normal. No respiratory distress. He has decreased breath sounds in the right lower field and the left lower field. He has no wheezes. He exhibits no tenderness.   Abdominal: Soft. Bowel  sounds are normal. He exhibits no distension. There is no tenderness.   Chevron inc clean, dry, intact with steri strips in place     Musculoskeletal: Normal range of motion. He exhibits no edema or tenderness.   Neurological: He is alert and oriented to person, place, and time. He has normal reflexes.   Skin: Skin is warm and dry. Capillary refill takes 2 to 3 seconds. He is not diaphoretic. No pallor.   Psychiatric: He has a normal mood and affect. His behavior is normal. Thought content normal. His affect is not labile. He is not slowed. Cognition and memory are not impaired.   Nursing note and vitals reviewed.      Laboratory:  Immunosuppressants         Stop Route Frequency     tacrolimus capsule 2 mg      -- SL 2 times daily        CBC:   Recent Labs   Lab 12/27/18  0648   WBC 11.73   RBC 3.30*   HGB 9.5*   HCT 30.3*      MCV 92   MCH 28.8   MCHC 31.4*     CMP:   Recent Labs   Lab 12/27/18  0648   *   CALCIUM 9.1   ALBUMIN 2.6*   PROT 6.2      K 3.6   CO2 27      BUN 17   CREATININE 1.5*   ALKPHOS 142*   ALT 10   AST 11   BILITOT 1.1*     Labs within the past 24 hours have been reviewed.    Diagnostic Results:  I have personally reviewed all pertinent imaging studies.

## 2018-12-28 LAB
ALBUMIN SERPL BCP-MCNC: 2.3 G/DL
ALP SERPL-CCNC: 144 U/L
ALT SERPL W/O P-5'-P-CCNC: 10 U/L
ANION GAP SERPL CALC-SCNC: 8 MMOL/L
AST SERPL-CCNC: 11 U/L
BASOPHILS # BLD AUTO: 0.16 K/UL
BASOPHILS NFR BLD: 1.5 %
BILIRUB SERPL-MCNC: 1 MG/DL
BUN SERPL-MCNC: 33 MG/DL
CALCIUM SERPL-MCNC: 9.1 MG/DL
CHLORIDE SERPL-SCNC: 105 MMOL/L
CO2 SERPL-SCNC: 26 MMOL/L
CREAT SERPL-MCNC: 1.8 MG/DL
DIFFERENTIAL METHOD: ABNORMAL
EOSINOPHIL # BLD AUTO: 0.8 K/UL
EOSINOPHIL NFR BLD: 7.7 %
ERYTHROCYTE [DISTWIDTH] IN BLOOD BY AUTOMATED COUNT: 15.7 %
EST. GFR  (AFRICAN AMERICAN): 48.6 ML/MIN/1.73 M^2
EST. GFR  (NON AFRICAN AMERICAN): 42 ML/MIN/1.73 M^2
GLUCOSE SERPL-MCNC: 135 MG/DL
HCT VFR BLD AUTO: 26.8 %
HGB BLD-MCNC: 8.5 G/DL
IMM GRANULOCYTES # BLD AUTO: 0.2 K/UL
IMM GRANULOCYTES NFR BLD AUTO: 1.9 %
LYMPHOCYTES # BLD AUTO: 0.9 K/UL
LYMPHOCYTES NFR BLD: 8.1 %
MAGNESIUM SERPL-MCNC: 1.4 MG/DL
MCH RBC QN AUTO: 28.3 PG
MCHC RBC AUTO-ENTMCNC: 31.7 G/DL
MCV RBC AUTO: 89 FL
MONOCYTES # BLD AUTO: 1.5 K/UL
MONOCYTES NFR BLD: 14.2 %
NEUTROPHILS # BLD AUTO: 7.1 K/UL
NEUTROPHILS NFR BLD: 66.6 %
NRBC BLD-RTO: 0 /100 WBC
PHOSPHATE SERPL-MCNC: 1.1 MG/DL
PLATELET # BLD AUTO: 234 K/UL
PMV BLD AUTO: 11.9 FL
POCT GLUCOSE: 114 MG/DL (ref 70–110)
POCT GLUCOSE: 138 MG/DL (ref 70–110)
POCT GLUCOSE: 157 MG/DL (ref 70–110)
POCT GLUCOSE: 172 MG/DL (ref 70–110)
POTASSIUM SERPL-SCNC: 3.6 MMOL/L
PROT SERPL-MCNC: 5.9 G/DL
RBC # BLD AUTO: 3 M/UL
SODIUM SERPL-SCNC: 139 MMOL/L
TACROLIMUS BLD-MCNC: 6.3 NG/ML
WBC # BLD AUTO: 10.6 K/UL

## 2018-12-28 PROCEDURE — 25000003 PHARM REV CODE 250: Performed by: STUDENT IN AN ORGANIZED HEALTH CARE EDUCATION/TRAINING PROGRAM

## 2018-12-28 PROCEDURE — 25000003 PHARM REV CODE 250: Performed by: NURSE PRACTITIONER

## 2018-12-28 PROCEDURE — 63600175 PHARM REV CODE 636 W HCPCS: Performed by: NURSE PRACTITIONER

## 2018-12-28 PROCEDURE — 80197 ASSAY OF TACROLIMUS: CPT

## 2018-12-28 PROCEDURE — 83735 ASSAY OF MAGNESIUM: CPT

## 2018-12-28 PROCEDURE — 85025 COMPLETE CBC W/AUTO DIFF WBC: CPT

## 2018-12-28 PROCEDURE — 20600001 HC STEP DOWN PRIVATE ROOM

## 2018-12-28 PROCEDURE — 36415 COLL VENOUS BLD VENIPUNCTURE: CPT

## 2018-12-28 PROCEDURE — 94664 DEMO&/EVAL PT USE INHALER: CPT

## 2018-12-28 PROCEDURE — G8997 SWALLOW GOAL STATUS: HCPCS | Mod: CH

## 2018-12-28 PROCEDURE — 92526 ORAL FUNCTION THERAPY: CPT

## 2018-12-28 PROCEDURE — 99233 PR SUBSEQUENT HOSPITAL CARE,LEVL III: ICD-10-PCS | Mod: 24,,, | Performed by: NURSE PRACTITIONER

## 2018-12-28 PROCEDURE — 84100 ASSAY OF PHOSPHORUS: CPT

## 2018-12-28 PROCEDURE — 99900035 HC TECH TIME PER 15 MIN (STAT)

## 2018-12-28 PROCEDURE — 99233 SBSQ HOSP IP/OBS HIGH 50: CPT | Mod: 24,,, | Performed by: NURSE PRACTITIONER

## 2018-12-28 PROCEDURE — 80053 COMPREHEN METABOLIC PANEL: CPT

## 2018-12-28 PROCEDURE — P9047 ALBUMIN (HUMAN), 25%, 50ML: HCPCS | Mod: JG | Performed by: NURSE PRACTITIONER

## 2018-12-28 PROCEDURE — C9113 INJ PANTOPRAZOLE SODIUM, VIA: HCPCS | Performed by: NURSE PRACTITIONER

## 2018-12-28 PROCEDURE — 63600175 PHARM REV CODE 636 W HCPCS: Mod: JG | Performed by: INTERNAL MEDICINE

## 2018-12-28 PROCEDURE — G8998 SWALLOW D/C STATUS: HCPCS | Mod: CH

## 2018-12-28 RX ORDER — SODIUM CHLORIDE 9 MG/ML
INJECTION, SOLUTION INTRAVENOUS ONCE
Status: DISCONTINUED | OUTPATIENT
Start: 2018-12-28 | End: 2018-12-28

## 2018-12-28 RX ORDER — ALBUMIN HUMAN 250 G/1000ML
25 SOLUTION INTRAVENOUS EVERY 8 HOURS
Status: COMPLETED | OUTPATIENT
Start: 2018-12-28 | End: 2018-12-29

## 2018-12-28 RX ADMIN — LIDOCAINE 1 PATCH: 50 PATCH CUTANEOUS at 04:12

## 2018-12-28 RX ADMIN — HEPARIN SODIUM 5000 UNITS: 5000 INJECTION, SOLUTION INTRAVENOUS; SUBCUTANEOUS at 09:12

## 2018-12-28 RX ADMIN — ESCITALOPRAM OXALATE 20 MG: 5 SOLUTION ORAL at 10:12

## 2018-12-28 RX ADMIN — ASPIRIN 81 MG CHEWABLE TABLET 81 MG: 81 TABLET CHEWABLE at 10:12

## 2018-12-28 RX ADMIN — ALBUMIN HUMAN 25 G: 0.25 SOLUTION INTRAVENOUS at 03:12

## 2018-12-28 RX ADMIN — DIBASIC SODIUM PHOSPHATE, MONOBASIC POTASSIUM PHOSPHATE AND MONOBASIC SODIUM PHOSPHATE 2 TABLET: 852; 155; 130 TABLET ORAL at 09:12

## 2018-12-28 RX ADMIN — Medication 12.5 MG: at 09:12

## 2018-12-28 RX ADMIN — ALBUMIN HUMAN 25 G: 0.25 SOLUTION INTRAVENOUS at 09:12

## 2018-12-28 RX ADMIN — PANTOPRAZOLE SODIUM 40 MG: 40 INJECTION, POWDER, LYOPHILIZED, FOR SOLUTION INTRAVENOUS at 07:12

## 2018-12-28 RX ADMIN — DIBASIC SODIUM PHOSPHATE, MONOBASIC POTASSIUM PHOSPHATE AND MONOBASIC SODIUM PHOSPHATE 2 TABLET: 852; 155; 130 TABLET ORAL at 10:12

## 2018-12-28 RX ADMIN — TRAMADOL HYDROCHLORIDE 50 MG: 50 TABLET, FILM COATED ORAL at 04:12

## 2018-12-28 RX ADMIN — DIBASIC SODIUM PHOSPHATE, MONOBASIC POTASSIUM PHOSPHATE AND MONOBASIC SODIUM PHOSPHATE 2 TABLET: 852; 155; 130 TABLET ORAL at 01:12

## 2018-12-28 RX ADMIN — TIZANIDINE 2 MG: 2 TABLET ORAL at 07:12

## 2018-12-28 RX ADMIN — TIZANIDINE 2 MG: 2 TABLET ORAL at 09:12

## 2018-12-28 RX ADMIN — ERYTHROPOIETIN 4200 UNITS: 10000 INJECTION, SOLUTION INTRAVENOUS; SUBCUTANEOUS at 11:12

## 2018-12-28 RX ADMIN — TACROLIMUS 2 MG: 1 CAPSULE ORAL at 07:12

## 2018-12-28 RX ADMIN — PANTOPRAZOLE SODIUM 40 MG: 40 INJECTION, POWDER, LYOPHILIZED, FOR SOLUTION INTRAVENOUS at 09:12

## 2018-12-28 RX ADMIN — DIBASIC SODIUM PHOSPHATE, MONOBASIC POTASSIUM PHOSPHATE AND MONOBASIC SODIUM PHOSPHATE 2 TABLET: 852; 155; 130 TABLET ORAL at 05:12

## 2018-12-28 RX ADMIN — URSODIOL 300 MG: 300 CAPSULE ORAL at 10:12

## 2018-12-28 RX ADMIN — ISAVUCONAZONIUM SULFATE 372 MG: 186 CAPSULE ORAL at 10:12

## 2018-12-28 RX ADMIN — Medication 12.5 MG: at 10:12

## 2018-12-28 RX ADMIN — HEPARIN SODIUM 5000 UNITS: 5000 INJECTION, SOLUTION INTRAVENOUS; SUBCUTANEOUS at 03:12

## 2018-12-28 RX ADMIN — MORPHINE 100 MG: 10 SOLUTION ORAL at 09:12

## 2018-12-28 RX ADMIN — TRAMADOL HYDROCHLORIDE 50 MG: 50 TABLET, FILM COATED ORAL at 08:12

## 2018-12-28 RX ADMIN — SULFAMETHOXAZOLE AND TRIMETHOPRIM 1 TABLET: 400; 80 TABLET ORAL at 09:12

## 2018-12-28 RX ADMIN — URSODIOL 300 MG: 300 CAPSULE ORAL at 09:12

## 2018-12-28 RX ADMIN — HEPARIN SODIUM 5000 UNITS: 5000 INJECTION, SOLUTION INTRAVENOUS; SUBCUTANEOUS at 06:12

## 2018-12-28 RX ADMIN — TACROLIMUS 1.5 MG: 1 CAPSULE ORAL at 05:12

## 2018-12-28 NOTE — ASSESSMENT & PLAN NOTE
- Pt remains significantly debilitated.   - PT/OT for strengthening.   - Currently will need SNF for placement and further rehabilitation.   - Pt remains severely debilitated and not strong enough for SNF at this time.    - SNF consult placed 12/17. However, denied for SNF again as not strong enough at this time.  - Rehab consulted.  We now have a contract w [facility] for rehab.  Rehab willing to accept patient when medically appropriate.    - Continue aggressive therapy.

## 2018-12-28 NOTE — ASSESSMENT & PLAN NOTE
- Dietician following. Severe temporal, clavicle muscle depletion noted. Severe subq fat loss  - Nutrition has been poor over the past 24 hours.   - Discussed at length with patient and caregivers that if patient's nutrition status does not improve, will need supplemental nutrition via tube feeds or TPN.  - Appetite stimulant started 12/19.   - Caregivers to bring in outside food for patient.   - Continue calorie count.  - EGD Monday 12/24 as pt c/o poor appetite + burning in chest/esophagus and occasionally vomiting after eating x several weeks.   - EGD 12/24 unremarkable. prabhakar tube placed afterwards.   - TF started for nutrition 12/24/18. TF at goal rate, 50 ml/hr.  - Prealbumin 7 on 12/26.

## 2018-12-28 NOTE — ASSESSMENT & PLAN NOTE
- Last liver US 11/27. Evolving peritransplant hematomas with anechoic fluid collection adjacent to the right hepatic lobe.  Small volume perihepatic free fluid.  - Chest/Abd/pelvis CT 12/4 - no fluid to be drainged per transplant surgeon.  No source of bleeding.    - Last transfused 12/26 w HD w appropriate response.  Monitor with daily labs.

## 2018-12-28 NOTE — ASSESSMENT & PLAN NOTE
- Continue appetite stimulant.  - GI consulted as pt c/o burning sensation in chest/esophagus + vomiting after eating, passed SLP eval, recommended GI f/u.  - Cont to encourage PO intake. Ordered additional supplements.   - EGD 12/24, unremarkable. prabhakar tube placed and TF started. Increased TF to 45 ml, new goal rate 50 ml/hr.  - Prealbumin 7 on 12/26.

## 2018-12-28 NOTE — PROGRESS NOTES
[facility]  Liver Transplant  Progress Note    Patient Name: Femi Enciso  MRN: [id number]  Admission Date: 10/27/2018  Hospital Length of Stay: 62 days  Code Status: Full Code  Primary Care Provider: Primary Doctor No  Post-Operative Day: 47    ORGAN:   LIVER  Disease Etiology: Alcoholic Cirrhosis  Donor Type:    - Brain Death  CDC High Risk:   No  Donor CMV Status:   Donor CMV Status: Positive  Donor HBcAB:   Negative  Donor HCV Status:   Negative  Donor HBV JAVIER: Negative  Donor HCV JAVIER: Negative  Whole or Partial: Whole Liver  Biliary Anastomosis: End to End  Arterial Anatomy: Standard  Subjective:     History of Present Illness:  Femi Enciso is a 53yr old male with PMH of acute ETOH hepatitis and DEL/ATN evolved to ESRD requiring HD (not candidate for KTX). He is now s/p liver transplant (SM induction, DBD, CMV D+/R-). Transplant surgery noted severe collateralization, adrenal gland firmly adhered to liver. Bare area of adrenal gland required several stitches and packing to obtain fair hemostasis (EBL 15L). OR take back  for bleeding from R adrenal bed in AM (significant clot in retroperitoneum, posterior to R hepatic lobe, tract posterior to the R kidney containing significant clot, small bleeding area of retroperitoneal fat) then returned to surgery same day for hemorrhaging closed with wound vac with plans to return to OR 24-48 hours for closure (retroperitoneal /retrorenal/retrocolic hematoma on the right ). Raw retroperitoneal bleeding. Suture ligatures placed, argon beam cautery, evarest placed in retroperitoneum behind cava and right kidney. Significant oozing from upper right adrenal gland, not amenable to ligation, that portion of the adrenal gland was resected with cautery). OR take back  for washout and incisional closure, no significant bleeding or hematoma found. OR cultures   from body fluid grew enterococcus faecium VRE. ID was consulted,  started on  daptomycin for VRE treatment and cefepime/flagyl to cover for IA ty in bile. Patient with significant leukocytosis with peak on 11/22 at 48K prompting drainage of R subphrenic fluid collection, perc drain placed, culture grew VRE. Stroke code called 11/21 for lethargy and unresponsive, CT head without evidence of acute ischemic changes and CTA chest negative, vascular neurology was consulted recommended MRI brain, but patient's AMS improved shortly after event. Nephrology consulted for ESRD requiring HD. Patient overall improved on antibiotic regimen, leukocytosis and AMS improved. He was transferred to TSU on POD #15.     Hospital Course:  Pt c/o CP 12/21. EKG stable from prior. Troponin wnl. CP resolved 12/22. CXR 12/20 showed no detrimental change. Pt hypertensive prior to HD.  Dialysis tolerated well 12/21 with 1.5L taken off. Bps much improved after dialysis, but monitoring closely.    Interval History:  No acute events overnight. Last HD 12/26. Cr 1.8 from 1.5. Will hold off on HD today. Quentin tube in place with tube feedings at goal rate, 50 ml/hr. Pt encouraged to increase po intake also. Prealbumin 7 on 12/26. LFTs stable. Last liver US 12/17 with possible HAS. Vascular consulted and recommended repeating US in 10-14 days. Liver US pending today. Continue ASA. Pt participating with PT/OT. Pt will transfer to Rehab when medically appropriate and off tube feedings. Monitor.     Scheduled Meds:   albumin human 25%  25 g Intravenous Q8H    aspirin  81 mg Oral Daily    epoetin sherlyn (PROCRIT) injection  50 Units/kg (Dosing Weight) Intravenous Every Mon, Wed, Fri    ergocalciferol  50,000 Units Oral Q7 Days    escitalopram oxalate  20 mg Oral Daily    heparin (porcine)  5,000 Units Subcutaneous Q8H    isavuconazonium sulfate  372 mg Oral Daily    k phos di & mono-sod phos mono  500 mg Oral QID    lidocaine  1 patch Transdermal Q24H    metoprolol  12.5 mg Per NG tube BID    pantoprazole  40 mg  Intravenous BID    sulfamethoxazole-trimethoprim 400-80mg  1 tablet Oral Every Mon, Wed, Fri    tacrolimus  1.5 mg Sublingual BID    ursodiol  300 mg Oral BID    valganciclovir 50 mg/ml  100 mg Oral Once per day on Mon Wed Fri     Continuous Infusions:  PRN Meds:acetaminophen, albuterol-ipratropium, artificial tears, bacitracin, bisacodyl, dextrose 50%, dextrose 50%, glucagon (human recombinant), glucose, glucose, heparin (porcine), hydrALAZINE, midodrine, mirtazapine, ondansetron, ramelteon, tiZANidine, traMADol    Review of Systems   Constitutional: Positive for activity change and appetite change (decreased). Negative for fatigue and fever.   HENT: Negative for congestion, facial swelling, sore throat and voice change.    Eyes: Negative for pain, discharge and visual disturbance.   Respiratory: Negative for apnea, cough, choking, chest tightness, shortness of breath and wheezing.    Cardiovascular: Negative for chest pain, palpitations and leg swelling.   Gastrointestinal: Positive for nausea. Negative for abdominal distention, abdominal pain, constipation, diarrhea and vomiting.   Endocrine: Negative.    Genitourinary: Positive for decreased urine volume. Negative for difficulty urinating, dysuria, hematuria and urgency.   Musculoskeletal: Negative.  Negative for back pain, gait problem, neck pain and neck stiffness.   Skin: Positive for wound. Negative for color change and pallor.   Allergic/Immunologic: Positive for immunocompromised state.   Neurological: Positive for weakness. Negative for dizziness, seizures, light-headedness and headaches.   Psychiatric/Behavioral: Negative for behavioral problems, confusion, decreased concentration, dysphoric mood, hallucinations, sleep disturbance and suicidal ideas. The patient is not nervous/anxious.      Objective:     Vital Signs (Most Recent):  Temp: 99 °F (37.2 °C) (12/28/18 1145)  Pulse: 88 (12/28/18 1145)  Resp: (!) 33 (12/28/18 1145)  BP: 126/88 (12/28/18  1145)  SpO2: 100 % (12/28/18 1145) Vital Signs (24h Range):  Temp:  [98.7 °F (37.1 °C)-99 °F (37.2 °C)] 99 °F (37.2 °C)  Pulse:  [] 88  Resp:  [18-33] 33  SpO2:  [98 %-100 %] 100 %  BP: (125-135)/(83-91) 126/88     Weight: 65.8 kg (145 lb 1 oz)  Body mass index is 20.81 kg/m².    Intake/Output - Last 3 Shifts       12/26 0700 - 12/27 0659 12/27 0700 - 12/28 0659 12/28 0700 - 12/29 0659    P.O. 100 160     I.V. (mL/kg)       Blood 300      Other 800      NG/ 1320 150    Total Intake(mL/kg) 2080 (31.6) 1480 (22.5) 150 (2.3)    Urine (mL/kg/hr) 120 (0.1) 150 (0.1) 50 (0.1)    Other 2800      Stool 0 0 0    Total Output 2920 150 50    Net -840 +1330 +100           Urine Occurrence 0 x      Stool Occurrence 0 x 1 x 1 x          Physical Exam   Constitutional: He is oriented to person, place, and time. He appears well-developed. No distress.   Temporal and distal extremity muscle wasting noted.   HENT:   Head: Normocephalic and atraumatic.   Mouth/Throat: Oropharynx is clear and moist. No oropharyngeal exudate.   Eyes: EOM are normal.   Neck: Normal range of motion. Neck supple. No JVD present.   Cardiovascular: Normal rate, regular rhythm, normal heart sounds and intact distal pulses.   No murmur heard.  Pulmonary/Chest: Effort normal. No respiratory distress. He has decreased breath sounds in the right lower field and the left lower field. He has no wheezes. He exhibits no tenderness.   Abdominal: Soft. Bowel sounds are normal. He exhibits no distension. There is no tenderness.   Chevron inc clean, dry, intact with steri strips in place     Musculoskeletal: Normal range of motion. He exhibits no edema or tenderness.   Neurological: He is alert and oriented to person, place, and time. He has normal reflexes.   Skin: Skin is warm and dry. Capillary refill takes 2 to 3 seconds. He is not diaphoretic. No pallor.   Psychiatric: He has a normal mood and affect. His behavior is normal. Thought content normal. His  "affect is not labile. He is not slowed. Cognition and memory are not impaired.   Nursing note and vitals reviewed.      Laboratory:  Immunosuppressants         Stop Route Frequency     tacrolimus capsule 1.5 mg      -- SL 2 times daily        CBC:   Recent Labs   Lab 12/28/18  0638   WBC 10.60   RBC 3.00*   HGB 8.5*   HCT 26.8*      MCV 89   MCH 28.3   MCHC 31.7*     CMP:   Recent Labs   Lab 12/28/18  0638   *   CALCIUM 9.1   ALBUMIN 2.3*   PROT 5.9*      K 3.6   CO2 26      BUN 33*   CREATININE 1.8*   ALKPHOS 144*   ALT 10   AST 11   BILITOT 1.0     Labs within the past 24 hours have been reviewed.    Diagnostic Results:  I have personally reviewed all pertinent imaging studies.    Assessment/Plan:     * S/P liver transplant    - LFTs now improving slowly.  T bili was 37.6 day of transplant.  - Drain placed during take back. Drain placed to intra-abdominal abscess on 11/22.   - Fluid from collection noted with enterococcus VRE completed Zyvox treatment.  - Routine 1 month Liver US 12/17 which showed elevated velocity of hepatic artery and low RIs.   - Vascular Surgery consulted. Recommend repeat US in 10-14 days (12/27-12/31) to reassess. Appreciate Vascular recs.  LFTs stable.  - Repeat liver US pending today. Will f/u results.      Chest pain    - Pt c/o "burning" diffuse CP 12/21 AM.  - EKG NSR, stable from prior.  - Troponin wnl.  - Likely GI related pain.        Hypophosphatemia    -  mg qid.         Acute kidney failure with lesion of tubular necrosis    - Cont Dialysis MWF.  - Nephrology following, appreciate recs.  - Monitor.     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       Hypomagnesemia    - Continue to monitor & replace as needed.     Severe malnutrition    - Dietician following. Severe temporal, clavicle muscle depletion noted. Severe subq fat loss  - Nutrition has been poor over the past 24 hours.   - Discussed at " length with patient and caregivers that if patient's nutrition status does not improve, will need supplemental nutrition via tube feeds or TPN.  - Appetite stimulant started 12/19.   - Caregivers to bring in outside food for patient.   - Continue calorie count.  - EGD Monday 12/24 as pt c/o poor appetite + burning in chest/esophagus and occasionally vomiting after eating x several weeks.   - EGD 12/24 unremarkable. jhon tube placed afterwards.   - TF started for nutrition 12/24/18. TF at goal rate, 50 ml/hr.  - Prealbumin 7 on 12/26.     Nausea    - Intermittent, worse over past 10 days,  Changed Pepcid to Protonix 12/9/18.  Appetite seems to be slowly improving.    - Encourage multiple, small meals and cont PRN anti-emetics.    - GI consulted.  EGD 12/24 w normal esophagus, erythematous mucosa in the antrum and nodular mucosa in the duodenal bulb w biopsies.  Path pending.  - JHON placed 12/24.  Pt tolerating tube feeds.  Denies nausea w TF.    - GI symptoms now slowly improving.        Anxiety    - Restarted Lexapro as per psych recs, 12/13.   - Monitor.     Delirium    - Pt with intermittent confusion since transplant was improving slowly.   - Pt with some lethargy and confusion on 11/21. Head CT negative.   - AAOx3. More alert and awake on 11/27.   - AAOx4 on 11/29. Seroquel d/c'd 11/30/18.  Trazodone for insomnia ordered w PRN dose.    - Pt then with delirium again. re consulted psych.   - delirium could be d/t rising prograf.  AMS improved since holding Prograf.    - CT head 12/4 with no acute findings.   - Restarted Prograf 12/6- will need to monitor mental status closely.  - 12/10 delirium resolved.     Prolonged Q-T interval on ECG    -  ms on EKG 12/19.  -  on EKG 12/21.  - Monitor.       Alcohol use disorder, severe, in early remission, dependence    - Monitor.       Decreased appetite    - Continue appetite stimulant.  - GI consulted as pt c/o burning sensation in chest/esophagus + vomiting  after eating, passed SLP eval, recommended GI f/u.  - Cont to encourage PO intake. Ordered additional supplements.   - EGD 12/24, unremarkable. prabhakar tube placed and TF started. Increased TF to 45 ml, new goal rate 50 ml/hr.  - Prealbumin 7 on 12/26.     Acute renal failure with acute tubular necrosis superimposed on stage 3 chronic kidney disease    - 2 weeks for DEL prior to transplant.  - Renal function slow to recover post-op.   - Nephrology consulted for management.   - Cont with HD as per nephrology recs.  Apprec recs.    - Lasix 160 mg challenge 11/28 w/ minimal output.    - HD MWF. Tolerated well 12/26.    - Cr 1.8 from 1.5. Hold HD today. Reassess in AM.   - Strict I&Os.   - Albumin 25% x 3 doses today.     Intra-abdominal abscess    - Significant increase in WBC post-op.   - OR cultures from 11/18 noted with enterococcus VRE.   - Abdominal CT performed at that time with fluid collection and drain placed on 11/22 for drainage.   - Intra-abdominal abscess culture also with enterococcus VRE.   - ID consulted and pt placed on antibxs for treatment. Pt was on Cefepime, Daptomycin, and Fluconazole for treatment.    - Pt remains with RLQ drains (one JOSE A and one Percutaneous).  One JOSE A removed 11/28.  Perc drain in placed draining tan, clear drainage.  WBC slowly improving.   - Liver US on 11/26 reviewed.   - Abdominal CT performed 11/27 and reviewed. Fluid collection noted with improvement on CT.  ID following and reassured by findings.    - Dapto, Cefepime and Flagyl changed 11/30/18 to Linezolid 600 mg PO q 12 hr x 10 days per ID.     - Added back cefepime after thora.  ID re consulted.  - Completed Zyvox x 10 days per ID thru 12/9/18. Monitor.        Long-term use of immunosuppressant medication    - Cont with prograf. Draw prograf level daily and adjust dose as needed to maintain a therapeutic level.        At risk for opportunistic infections    - Cont with bactrim and valcyte for protection against  opportunistic infections.   - Cont Isavuconazonium for fungal prophylaxis.     Leukocytosis    - See intra-abdominal abscess.  - wbc improving, cont w delirium.    - Repeat cx 12/4 with NGTD.  Completed Linezolid and Cefepime per ID.    - Wbc trended up 12/11- repeat cx 12/11 w NGTD.  - WBC count improving.  No signs of infection.       Adrenal cortical steroids causing adverse effect in therapeutic use    - Monitor.       Prophylactic immunotherapy    - See long term immunosuppression.        Weakness    - Pt remains significantly debilitated.   - PT/OT for strengthening.   - Currently will need SNF for placement and further rehabilitation.   - Pt remains severely debilitated and not strong enough for SNF at this time.    - SNF consult placed 12/17. However, denied for SNF again as not strong enough at this time.  - Rehab consulted.  We now have a contract w [facility] for rehab.  Rehab willing to accept patient when medically appropriate.    - Continue aggressive therapy.     Pleural effusion associated with hepatic disorder    - c/o increased pain w deep breath today to right side.  CXR showed increased opacity in the inferior hemothorax on the right side since 11/26/2018.  Pulse ox 97-99% on RA.  Cont to monitor.   - continues to have fluid to RLL.  WBC remains elevated.    - thoracentesis performed on 11/30. Fluid noted with 4k WBC, 93 SEGS. cx no growth to date.  Cefepime added for treatment.  - Chest CT showing right pleural effusion improved, left w increased pleural effusion.   - Per ID, completed treatment of empyema w Cefepime thru 12/8.  - sats remain 97-99% on RA.  - CXR 12/20 showed left hemidiaphragmatic margin that is better delineated than on 12/19/2018, consistent with improved aeration in the left lower lobe. + no significant detrimental interval change in the appearance of the chest, with the current examination demonstrating a slightly improved inspiratory depth level.    - denies SOB.  Last CXR 12/22  w improvement.     Type 2 diabetes mellitus without complication    - Endocrine consulted for management. Appreciate recs.   - Hypoglycemia 12/10 requiring D50.    - Repeat cx 12/11 - no growth.  - Blood glucose readings well controlled on tube feeds.     Anemia of chronic disease    - Last liver US 11/27. Evolving peritransplant hematomas with anechoic fluid collection adjacent to the right hepatic lobe.  Small volume perihepatic free fluid.  - Chest/Abd/pelvis CT 12/4 - no fluid to be drainged per transplant surgeon.  No source of bleeding.    - Last transfused 12/26 w HD w appropriate response.  Monitor with daily labs.          VTE Risk Mitigation (From admission, onward)        Ordered     heparin (porcine) injection 5,000 Units  Every 8 hours      12/24/18 0742     heparin (porcine) injection 1,000 Units  As needed (PRN)      12/12/18 1731     IP VTE HIGH RISK PATIENT  Once      11/11/18 1208          The patients clinical status was discussed at multidisplinary rounds, involving transplant surgery, transplant medicine, pharmacy, nursing, nutrition, and social work    Discharge Planning: not a candidate for dc at this time.       Pamela Shirley NP  Liver Transplant  [facility]

## 2018-12-28 NOTE — SUBJECTIVE & OBJECTIVE
Scheduled Meds:   albumin human 25%  25 g Intravenous Q8H    aspirin  81 mg Oral Daily    epoetin sherlyn (PROCRIT) injection  50 Units/kg (Dosing Weight) Intravenous Every Mon, Wed, Fri    ergocalciferol  50,000 Units Oral Q7 Days    escitalopram oxalate  20 mg Oral Daily    heparin (porcine)  5,000 Units Subcutaneous Q8H    isavuconazonium sulfate  372 mg Oral Daily    k phos di & mono-sod phos mono  500 mg Oral QID    lidocaine  1 patch Transdermal Q24H    metoprolol  12.5 mg Per NG tube BID    pantoprazole  40 mg Intravenous BID    sulfamethoxazole-trimethoprim 400-80mg  1 tablet Oral Every Mon, Wed, Fri    tacrolimus  1.5 mg Sublingual BID    ursodiol  300 mg Oral BID    valganciclovir 50 mg/ml  100 mg Oral Once per day on Mon Wed Fri     Continuous Infusions:  PRN Meds:acetaminophen, albuterol-ipratropium, artificial tears, bacitracin, bisacodyl, dextrose 50%, dextrose 50%, glucagon (human recombinant), glucose, glucose, heparin (porcine), hydrALAZINE, midodrine, mirtazapine, ondansetron, ramelteon, tiZANidine, traMADol    Review of Systems   Constitutional: Positive for activity change and appetite change (decreased). Negative for fatigue and fever.   HENT: Negative for congestion, facial swelling, sore throat and voice change.    Eyes: Negative for pain, discharge and visual disturbance.   Respiratory: Negative for apnea, cough, choking, chest tightness, shortness of breath and wheezing.    Cardiovascular: Negative for chest pain, palpitations and leg swelling.   Gastrointestinal: Positive for nausea. Negative for abdominal distention, abdominal pain, constipation, diarrhea and vomiting.   Endocrine: Negative.    Genitourinary: Positive for decreased urine volume. Negative for difficulty urinating, dysuria, hematuria and urgency.   Musculoskeletal: Negative.  Negative for back pain, gait problem, neck pain and neck stiffness.   Skin: Positive for wound. Negative for color change and pallor.    Allergic/Immunologic: Positive for immunocompromised state.   Neurological: Positive for weakness. Negative for dizziness, seizures, light-headedness and headaches.   Psychiatric/Behavioral: Negative for behavioral problems, confusion, decreased concentration, dysphoric mood, hallucinations, sleep disturbance and suicidal ideas. The patient is not nervous/anxious.      Objective:     Vital Signs (Most Recent):  Temp: 99 °F (37.2 °C) (12/28/18 1145)  Pulse: 88 (12/28/18 1145)  Resp: (!) 33 (12/28/18 1145)  BP: 126/88 (12/28/18 1145)  SpO2: 100 % (12/28/18 1145) Vital Signs (24h Range):  Temp:  [98.7 °F (37.1 °C)-99 °F (37.2 °C)] 99 °F (37.2 °C)  Pulse:  [] 88  Resp:  [18-33] 33  SpO2:  [98 %-100 %] 100 %  BP: (125-135)/(83-91) 126/88     Weight: 65.8 kg (145 lb 1 oz)  Body mass index is 20.81 kg/m².    Intake/Output - Last 3 Shifts       12/26 0700 - 12/27 0659 12/27 0700 - 12/28 0659 12/28 0700 - 12/29 0659    P.O. 100 160     I.V. (mL/kg)       Blood 300      Other 800      NG/ 1320 150    Total Intake(mL/kg) 2080 (31.6) 1480 (22.5) 150 (2.3)    Urine (mL/kg/hr) 120 (0.1) 150 (0.1) 50 (0.1)    Other 2800      Stool 0 0 0    Total Output 2920 150 50    Net -840 +1330 +100           Urine Occurrence 0 x      Stool Occurrence 0 x 1 x 1 x          Physical Exam   Constitutional: He is oriented to person, place, and time. He appears well-developed. No distress.   Temporal and distal extremity muscle wasting noted.   HENT:   Head: Normocephalic and atraumatic.   Mouth/Throat: Oropharynx is clear and moist. No oropharyngeal exudate.   Eyes: EOM are normal.   Neck: Normal range of motion. Neck supple. No JVD present.   Cardiovascular: Normal rate, regular rhythm, normal heart sounds and intact distal pulses.   No murmur heard.  Pulmonary/Chest: Effort normal. No respiratory distress. He has decreased breath sounds in the right lower field and the left lower field. He has no wheezes. He exhibits no tenderness.    Abdominal: Soft. Bowel sounds are normal. He exhibits no distension. There is no tenderness.   Chevron inc clean, dry, intact with steri strips in place     Musculoskeletal: Normal range of motion. He exhibits no edema or tenderness.   Neurological: He is alert and oriented to person, place, and time. He has normal reflexes.   Skin: Skin is warm and dry. Capillary refill takes 2 to 3 seconds. He is not diaphoretic. No pallor.   Psychiatric: He has a normal mood and affect. His behavior is normal. Thought content normal. His affect is not labile. He is not slowed. Cognition and memory are not impaired.   Nursing note and vitals reviewed.      Laboratory:  Immunosuppressants         Stop Route Frequency     tacrolimus capsule 1.5 mg      -- SL 2 times daily        CBC:   Recent Labs   Lab 12/28/18  0638   WBC 10.60   RBC 3.00*   HGB 8.5*   HCT 26.8*      MCV 89   MCH 28.3   MCHC 31.7*     CMP:   Recent Labs   Lab 12/28/18  0638   *   CALCIUM 9.1   ALBUMIN 2.3*   PROT 5.9*      K 3.6   CO2 26      BUN 33*   CREATININE 1.8*   ALKPHOS 144*   ALT 10   AST 11   BILITOT 1.0     Labs within the past 24 hours have been reviewed.    Diagnostic Results:  I have personally reviewed all pertinent imaging studies.

## 2018-12-28 NOTE — ASSESSMENT & PLAN NOTE
- LFTs now improving slowly.  T bili was 37.6 day of transplant.  - Drain placed during take back. Drain placed to intra-abdominal abscess on 11/22.   - Fluid from collection noted with enterococcus VRE completed Zyvox treatment.  - Routine 1 month Liver US 12/17 which showed elevated velocity of hepatic artery and low RIs.   - Vascular Surgery consulted. Recommend repeat US in 10-14 days (12/27-12/31) to reassess. Appreciate Vascular recs.  LFTs stable.  - Repeat liver US pending today. Will f/u results.

## 2018-12-28 NOTE — PT/OT/SLP PROGRESS
Physical Therapy      Patient Name:  Femi Enciso   MRN:  [id number]    Patient not seen today secondary to off floor.  Pt off floor for ultrasound.  PT unable to return later in day. Will follow-up next scheduled date.    Percy Coleman, PT   12/28/2018

## 2018-12-28 NOTE — PROGRESS NOTES
" (SW) met with the patient (pt) for follow up and continuity of care.  Pt was observed to be lying in bed, alert and oriented, communicating openly and engaging appropriately.  Pt's mother was also present and denied any issues or concerns.  Pt stated he was having issues "chewing" food and becoming nauseous easily.  Pt was able to discuss this concern with Dr. Champion during rounds and was encouraged to increase his food intake slowly as there were no significant causes for his issues identified.  Pt expressed a "want" to eat more; however, being inhibited to do so causes him concern.  SW provided active listening, normalization and encouragement.  Pt denied any coping issues outside of his eating concerns.  Pt still awaits a decision for placement outside of the hospital.  SW remains available for education, resources, support and discharge planning as appropriate.   "

## 2018-12-28 NOTE — PLAN OF CARE
Problem: Adult Inpatient Plan of Care  Goal: Plan of Care Review  Outcome: Ongoing (interventions implemented as appropriate)  Patient aao x4. Call bell within reach. He is up with 1 person assist. TF running at goal rate of 50ml/hr. Pt eating very little. A few bites a couple times a day. Plan to continue to encourage meals. Liver ultrasound done today. Will continue to monitor.

## 2018-12-28 NOTE — ASSESSMENT & PLAN NOTE
- Endocrine consulted for management. Appreciate recs.   - Hypoglycemia 12/10 requiring D50.    - Repeat cx 12/11 - no growth.  - Blood glucose readings well controlled on tube feeds.

## 2018-12-28 NOTE — ASSESSMENT & PLAN NOTE
- 2 weeks for DEL prior to transplant.  - Renal function slow to recover post-op.   - Nephrology consulted for management.   - Cont with HD as per nephrology recs.  Apprec recs.    - Lasix 160 mg challenge 11/28 w/ minimal output.    - HD MWF. Tolerated well 12/26.    - Cr 1.8 from 1.5. Hold HD today. Reassess in AM.   - Strict I&Os.   - Albumin 25% x 3 doses today.

## 2018-12-28 NOTE — PLAN OF CARE
Problem: SLP Goal  Goal: SLP Goal  Speech-Language Pathology Goals  Goals to be met by 12/3/18  1. Patient will tolerate REGULAR DIET/THIN LIQUIDS with no s/s of aspiration.  2. Educate patient and family regarding risks/warning signs of aspiration.   Outcome: Outcome(s) achieved Date Met: 12/28/18    Patient seen for continued swallow assessment and monitoring of diet tolerance. Patient with a functional swallow at this time and appropriate for discharge from [facility]    KATHERIN Carmen-SLP  Speech-Language Pathology  Pager: [phone]

## 2018-12-28 NOTE — PT/OT/SLP PROGRESS
Speech Language Pathology Treatment    Patient Name:  Femi Enciso   MRN:  [id number]  Admitting Diagnosis: S/P liver transplant    Recommendations:                 General Recommendations:  Follow-up not indicated  Diet recommendations:  Regular, Liquid Diet Level: Thin   Aspiration Precautions: Standard aspiration precautions   General Precautions: Standard, fall  Communication strategies:  none    Subjective     Patient alert and cooperative during session  Communicated with nurse prior to session     Pain/Comfort:  · Pain Rating 1: 0/10  · Pain Rating Post-Intervention 1: 0/10    Objective:     Has the patient been evaluated by SLP for swallowing?   Yes  Keep patient NPO? No   Current Respiratory Status: room air      Patient seen for swallow assessment and monitoring of diet tolerance. Patient sitting up in bed with mother in room during session. He reported minimal PO intake since last session, but stated that he was swallowing without difficulty when he did eat/drink. Patient produced a strong volitional cough and throat clear and a timely swallow with good laryngeal elevation on palpation. He tolerated PO trials of thin liquid x3 (via bottle) and solids x3 (pieces of cereal) with no overt signs of aspiration. Patient declined further PO trials 2' to concern about nausea. SLP educated patient and mother regarding the risks/warning signs of aspiration and safe swallow strategies. They verbalized understanding and had no further questions. Patient left in bed with call light within reach.     Assessment:     Femi Enciso is a 53 y.o. male with a safe oropharyngeal swallow at this time.     Goals:   Multidisciplinary Problems     SLP Goals        Problem: SLP Goal    Goal Priority Disciplines Outcome   SLP Goal   (Resolved)     SLP Resolved for Upgrade   Description:  Speech Language Pathology Goals  Goals expected to be met by 11/30/2018  1. Pt will participate in ongoing swallow assessment MET  2. Pt will tolerate  regular diet with thin liquids, MOD I MET  3. Educate Pt and family on S/S aspiration                      Multidisciplinary Problems (Resolved)        Problem: SLP Goal    Goal Priority Disciplines Outcome   SLP Goal   (Resolved)     SLP Outcome(s) achieved   Description:  Speech-Language Pathology Goals  Goals to be met by 12/3/18  1. Patient will tolerate REGULAR DIET/THIN LIQUIDS with no s/s of aspiration.  2. Educate patient and family regarding risks/warning signs of aspiration.                    Plan:     · Patient to be seen:  4 x/week   · Plan of Care expires:  01/26/19  · Plan of Care reviewed with:  patient, mother   · SLP Follow-Up:  No       Discharge recommendations:  (no further ST recommended)   Barriers to Discharge:  None    Time Tracking:     SLP Treatment Date:   12/28/18  Speech Start Time:  0849  Speech Stop Time:  0901     Speech Total Time (min):  12 min    Billable Minutes: Treatment Swallowing Dysfunction 12    Sid Hernandez CF-SLP  Speech-Language Pathology  Pager: [phone]   12/28/2018

## 2018-12-29 LAB
ALBUMIN SERPL BCP-MCNC: 3.2 G/DL
ALP SERPL-CCNC: 144 U/L
ALT SERPL W/O P-5'-P-CCNC: 9 U/L
ANION GAP SERPL CALC-SCNC: 8 MMOL/L
AST SERPL-CCNC: 14 U/L
BACTERIA BLD CULT: NORMAL
BACTERIA BLD CULT: NORMAL
BASOPHILS # BLD AUTO: 0.23 K/UL
BASOPHILS NFR BLD: 2.2 %
BILIRUB SERPL-MCNC: 0.9 MG/DL
BUN SERPL-MCNC: 44 MG/DL
CALCIUM SERPL-MCNC: 9.3 MG/DL
CHLORIDE SERPL-SCNC: 104 MMOL/L
CO2 SERPL-SCNC: 27 MMOL/L
CREAT SERPL-MCNC: 1.9 MG/DL
DIFFERENTIAL METHOD: ABNORMAL
EOSINOPHIL # BLD AUTO: 0.9 K/UL
EOSINOPHIL NFR BLD: 8.8 %
ERYTHROCYTE [DISTWIDTH] IN BLOOD BY AUTOMATED COUNT: 15.9 %
EST. GFR  (AFRICAN AMERICAN): 45.5 ML/MIN/1.73 M^2
EST. GFR  (NON AFRICAN AMERICAN): 39.4 ML/MIN/1.73 M^2
GLUCOSE SERPL-MCNC: 135 MG/DL
HCT VFR BLD AUTO: 24.7 %
HGB BLD-MCNC: 7.5 G/DL
IMM GRANULOCYTES # BLD AUTO: 0.16 K/UL
IMM GRANULOCYTES NFR BLD AUTO: 1.5 %
LYMPHOCYTES # BLD AUTO: 1 K/UL
LYMPHOCYTES NFR BLD: 9.3 %
MAGNESIUM SERPL-MCNC: 1.5 MG/DL
MCH RBC QN AUTO: 28 PG
MCHC RBC AUTO-ENTMCNC: 30.4 G/DL
MCV RBC AUTO: 92 FL
MONOCYTES # BLD AUTO: 1.4 K/UL
MONOCYTES NFR BLD: 12.8 %
NEUTROPHILS # BLD AUTO: 6.9 K/UL
NEUTROPHILS NFR BLD: 65.4 %
NRBC BLD-RTO: 0 /100 WBC
PHOSPHATE SERPL-MCNC: 1.8 MG/DL
PLATELET # BLD AUTO: 243 K/UL
PMV BLD AUTO: 12.3 FL
POCT GLUCOSE: 125 MG/DL (ref 70–110)
POCT GLUCOSE: 173 MG/DL (ref 70–110)
POTASSIUM SERPL-SCNC: 3.4 MMOL/L
PROT SERPL-MCNC: 6.3 G/DL
RBC # BLD AUTO: 2.68 M/UL
SODIUM SERPL-SCNC: 139 MMOL/L
TACROLIMUS BLD-MCNC: 5.6 NG/ML
WBC # BLD AUTO: 10.59 K/UL

## 2018-12-29 PROCEDURE — 63600175 PHARM REV CODE 636 W HCPCS: Mod: JG | Performed by: NURSE PRACTITIONER

## 2018-12-29 PROCEDURE — 25000003 PHARM REV CODE 250: Performed by: NURSE PRACTITIONER

## 2018-12-29 PROCEDURE — 99233 SBSQ HOSP IP/OBS HIGH 50: CPT | Mod: ,,, | Performed by: PHYSICIAN ASSISTANT

## 2018-12-29 PROCEDURE — 63600175 PHARM REV CODE 636 W HCPCS: Performed by: NURSE PRACTITIONER

## 2018-12-29 PROCEDURE — 85025 COMPLETE CBC W/AUTO DIFF WBC: CPT

## 2018-12-29 PROCEDURE — 80053 COMPREHEN METABOLIC PANEL: CPT

## 2018-12-29 PROCEDURE — 84100 ASSAY OF PHOSPHORUS: CPT

## 2018-12-29 PROCEDURE — C9113 INJ PANTOPRAZOLE SODIUM, VIA: HCPCS | Performed by: NURSE PRACTITIONER

## 2018-12-29 PROCEDURE — 20600001 HC STEP DOWN PRIVATE ROOM

## 2018-12-29 PROCEDURE — 99232 SBSQ HOSP IP/OBS MODERATE 35: CPT | Mod: ,,, | Performed by: INTERNAL MEDICINE

## 2018-12-29 PROCEDURE — 36415 COLL VENOUS BLD VENIPUNCTURE: CPT

## 2018-12-29 PROCEDURE — 25000003 PHARM REV CODE 250: Performed by: PHYSICIAN ASSISTANT

## 2018-12-29 PROCEDURE — 63600175 PHARM REV CODE 636 W HCPCS: Performed by: PHYSICIAN ASSISTANT

## 2018-12-29 PROCEDURE — 99233 PR SUBSEQUENT HOSPITAL CARE,LEVL III: ICD-10-PCS | Mod: ,,, | Performed by: PHYSICIAN ASSISTANT

## 2018-12-29 PROCEDURE — 99232 PR SUBSEQUENT HOSPITAL CARE,LEVL II: ICD-10-PCS | Mod: ,,, | Performed by: INTERNAL MEDICINE

## 2018-12-29 PROCEDURE — P9047 ALBUMIN (HUMAN), 25%, 50ML: HCPCS | Mod: JG | Performed by: NURSE PRACTITIONER

## 2018-12-29 PROCEDURE — 83735 ASSAY OF MAGNESIUM: CPT

## 2018-12-29 PROCEDURE — 25000003 PHARM REV CODE 250: Performed by: STUDENT IN AN ORGANIZED HEALTH CARE EDUCATION/TRAINING PROGRAM

## 2018-12-29 PROCEDURE — 80197 ASSAY OF TACROLIMUS: CPT

## 2018-12-29 RX ORDER — ALUMINUM HYDROXIDE, MAGNESIUM HYDROXIDE, AND SIMETHICONE 2400; 240; 2400 MG/30ML; MG/30ML; MG/30ML
30 SUSPENSION ORAL EVERY 6 HOURS PRN
Status: DISCONTINUED | OUTPATIENT
Start: 2018-12-29 | End: 2019-01-08 | Stop reason: HOSPADM

## 2018-12-29 RX ORDER — TACROLIMUS 1 MG/1
1 CAPSULE ORAL 2 TIMES DAILY
Status: DISCONTINUED | OUTPATIENT
Start: 2018-12-29 | End: 2019-01-02

## 2018-12-29 RX ORDER — MYCOPHENOLATE MOFETIL 200 MG/ML
500 POWDER, FOR SUSPENSION ORAL 2 TIMES DAILY
Status: DISCONTINUED | OUTPATIENT
Start: 2018-12-29 | End: 2019-01-08

## 2018-12-29 RX ADMIN — TACROLIMUS 1 MG: 1 CAPSULE ORAL at 05:12

## 2018-12-29 RX ADMIN — ASPIRIN 81 MG CHEWABLE TABLET 81 MG: 81 TABLET CHEWABLE at 08:12

## 2018-12-29 RX ADMIN — ALBUMIN HUMAN 25 G: 0.25 SOLUTION INTRAVENOUS at 06:12

## 2018-12-29 RX ADMIN — TRAMADOL HYDROCHLORIDE 50 MG: 50 TABLET, FILM COATED ORAL at 05:12

## 2018-12-29 RX ADMIN — PANTOPRAZOLE SODIUM 40 MG: 40 INJECTION, POWDER, LYOPHILIZED, FOR SOLUTION INTRAVENOUS at 08:12

## 2018-12-29 RX ADMIN — DIBASIC SODIUM PHOSPHATE, MONOBASIC POTASSIUM PHOSPHATE AND MONOBASIC SODIUM PHOSPHATE 2 TABLET: 852; 155; 130 TABLET ORAL at 05:12

## 2018-12-29 RX ADMIN — TACROLIMUS 1.5 MG: 1 CAPSULE ORAL at 08:12

## 2018-12-29 RX ADMIN — URSODIOL 300 MG: 300 CAPSULE ORAL at 08:12

## 2018-12-29 RX ADMIN — DIBASIC SODIUM PHOSPHATE, MONOBASIC POTASSIUM PHOSPHATE AND MONOBASIC SODIUM PHOSPHATE 2 TABLET: 852; 155; 130 TABLET ORAL at 01:12

## 2018-12-29 RX ADMIN — DIBASIC SODIUM PHOSPHATE, MONOBASIC POTASSIUM PHOSPHATE AND MONOBASIC SODIUM PHOSPHATE 2 TABLET: 852; 155; 130 TABLET ORAL at 08:12

## 2018-12-29 RX ADMIN — HEPARIN SODIUM 5000 UNITS: 5000 INJECTION, SOLUTION INTRAVENOUS; SUBCUTANEOUS at 06:12

## 2018-12-29 RX ADMIN — ALUMINUM HYDROXIDE, MAGNESIUM HYDROXIDE, AND DIMETHICONE 30 ML: 400; 400; 40 SUSPENSION ORAL at 01:12

## 2018-12-29 RX ADMIN — Medication 500 MG: at 09:12

## 2018-12-29 RX ADMIN — Medication 12.5 MG: at 08:12

## 2018-12-29 RX ADMIN — HEPARIN SODIUM 5000 UNITS: 5000 INJECTION, SOLUTION INTRAVENOUS; SUBCUTANEOUS at 01:12

## 2018-12-29 RX ADMIN — Medication 500 MG: at 08:12

## 2018-12-29 RX ADMIN — HEPARIN SODIUM 5000 UNITS: 5000 INJECTION, SOLUTION INTRAVENOUS; SUBCUTANEOUS at 08:12

## 2018-12-29 RX ADMIN — ISAVUCONAZONIUM SULFATE 372 MG: 186 CAPSULE ORAL at 08:12

## 2018-12-29 RX ADMIN — LIDOCAINE 1 PATCH: 50 PATCH CUTANEOUS at 05:12

## 2018-12-29 RX ADMIN — TIZANIDINE 2 MG: 2 TABLET ORAL at 08:12

## 2018-12-29 RX ADMIN — ESCITALOPRAM OXALATE 20 MG: 5 SOLUTION ORAL at 08:12

## 2018-12-29 NOTE — ASSESSMENT & PLAN NOTE
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     Increasing urine output and getting clearer, 400ccs recorded, but may have been more, no evidence for significant volume overload, lytes and acid/base are fine, delta sCr only 0.1    Plan:  -hold on RRT and continue monitoring UOP and serial RFPs, but hopeful he may not need any further treatments as long as there are no other insults

## 2018-12-29 NOTE — SUBJECTIVE & OBJECTIVE
Interval History: last HD 12/26, held yesterday as there seemed to be recovery, today sCr 1.9 (1.8) yesterday    Review of patient's allergies indicates:   Allergen Reactions    Penicillins Nausea And Vomiting and Rash     Full body rash from cefepime as well     Current Facility-Administered Medications   Medication Frequency    acetaminophen tablet 650 mg Q6H PRN    albuterol-ipratropium 2.5 mg-0.5 mg/3 mL nebulizer solution 3 mL Q4H PRN    artificial tears 0.5 % ophthalmic solution 1 drop PRN    aspirin chewable tablet 81 mg Daily    bacitracin ointment PRN    bisacodyl suppository 10 mg Daily PRN    dextrose 50% injection 12.5 g PRN    dextrose 50% injection 25 g PRN    epoetin sherlyn injection 4,200 Units Every Mon, Wed, Fri    ergocalciferol capsule 50,000 Units Q7 Days    escitalopram oxalate 5 mg/5 mL solution 20 mg Daily    glucagon (human recombinant) injection 1 mg PRN    glucose chewable tablet 16 g PRN    glucose chewable tablet 24 g PRN    heparin (porcine) injection 1,000 Units PRN    heparin (porcine) injection 5,000 Units Q8H    hydrALAZINE injection 10 mg Q6H PRN    isavuconazonium sulfate Cap 372 mg Daily    k phos di & mono-sod phos mono 250 mg tablet 2 tablet QID    lidocaine 5 % patch 1 patch Q24H    metoprolol 12.5mg/1.25mL oral solution 12.5 mg BID    midodrine tablet 10 mg TID PRN    mirtazapine tablet 7.5 mg Nightly PRN    mycophenolate mofetil 200 mg/mL suspension 500 mg BID    ondansetron injection 4 mg Q6H PRN    pantoprazole injection 40 mg BID    ramelteon tablet 8 mg Nightly PRN    sulfamethoxazole-trimethoprim 400-80mg per tablet 1 tablet Every Mon, Wed, Fri    tacrolimus capsule 1.5 mg BID    tiZANidine tablet 2 mg Q8H PRN    traMADol tablet 50 mg Q6H PRN    ursodiol oral suspension (conc: 60 mg/mL) 300 mg BID    valganciclovir 50 mg/ml oral solution 100 mg Once per day on Mon Wed Fri       Objective:     Vital Signs (Most Recent):  Temp: 98.8 °F  (37.1 °C) (12/29/18 0800)  Pulse: 95 (12/29/18 0800)  Resp: 18 (12/29/18 0800)  BP: (!) 140/92 (12/29/18 0800)  SpO2: 98 % (12/29/18 0800)  O2 Device (Oxygen Therapy): room air (12/29/18 0800) Vital Signs (24h Range):  Temp:  [98.4 °F (36.9 °C)-99.2 °F (37.3 °C)] 98.8 °F (37.1 °C)  Pulse:  [81-95] 95  Resp:  [17-29] 18  SpO2:  [98 %-100 %] 98 %  BP: (126-154)/() 140/92     Weight: 65.8 kg (145 lb 1 oz) (12/27/18 0500)  Body mass index is 20.81 kg/m².  Body surface area is 1.8 meters squared.    I/O last 3 completed shifts:  In: 2595 [P.O.:520; I.V.:300; NG/GT:1775]  Out: 450 [Urine:450]    Physical Exam   Constitutional: He is oriented to person, place, and time.   HENT:   Head: Atraumatic.   Neck: No JVD present.   Cardiovascular: Normal rate and regular rhythm. Exam reveals no friction rub.   Pulmonary/Chest: Effort normal and breath sounds normal.   Abdominal: Soft. He exhibits distension.   Musculoskeletal: He exhibits no edema.   Neurological: He is alert and oriented to person, place, and time.   Skin: Skin is warm.   Psychiatric: He has a normal mood and affect.

## 2018-12-29 NOTE — ASSESSMENT & PLAN NOTE
- Pt remains significantly debilitated.   - PT/OT for strengthening.   - Currently will need SNF for placement and further rehabilitation.   - Pt remains severely debilitated and not strong enough for SNF at this time.    - SNF consult placed 12/17. However, denied for SNF again as not strong enough at this time.  - Rehab consulted.  We now have a contract w [facility] for rehab.  Rehab willing to accept patient when medically appropriate.    - Continue aggressive therapy.

## 2018-12-29 NOTE — ASSESSMENT & PLAN NOTE
- Cont with prograf. Draw prograf level daily and adjust dose as needed to maintain a therapeutic level.   - restarted MMF on 12/29

## 2018-12-29 NOTE — SUBJECTIVE & OBJECTIVE
Scheduled Meds:   aspirin  81 mg Oral Daily    epoetin sherlyn (PROCRIT) injection  50 Units/kg (Dosing Weight) Intravenous Every Mon, Wed, Fri    ergocalciferol  50,000 Units Oral Q7 Days    escitalopram oxalate  20 mg Oral Daily    heparin (porcine)  5,000 Units Subcutaneous Q8H    isavuconazonium sulfate  372 mg Oral Daily    k phos di & mono-sod phos mono  500 mg Oral QID    lidocaine  1 patch Transdermal Q24H    metoprolol  12.5 mg Per NG tube BID    mycophenolate mofetil  500 mg Oral BID    pantoprazole  40 mg Intravenous BID    sulfamethoxazole-trimethoprim 400-80mg  1 tablet Oral Every Mon, Wed, Fri    tacrolimus  1.5 mg Sublingual BID    ursodiol  300 mg Oral BID    valganciclovir 50 mg/ml  100 mg Oral Once per day on Mon Wed Fri     Continuous Infusions:  PRN Meds:acetaminophen, albuterol-ipratropium, aluminum & magnesium hydroxide-simethicone, artificial tears, bacitracin, bisacodyl, dextrose 50%, dextrose 50%, glucagon (human recombinant), glucose, glucose, heparin (porcine), hydrALAZINE, midodrine, mirtazapine, ondansetron, ramelteon, tiZANidine, traMADol    Review of Systems   Constitutional: Positive for activity change and appetite change (decreased). Negative for fatigue and fever.   HENT: Negative for congestion, facial swelling, sore throat and voice change.    Eyes: Negative for pain, discharge and visual disturbance.   Respiratory: Negative for apnea, cough, choking, chest tightness, shortness of breath and wheezing.    Cardiovascular: Negative for chest pain, palpitations and leg swelling.   Gastrointestinal: Positive for nausea. Negative for abdominal distention, abdominal pain, constipation, diarrhea and vomiting.   Endocrine: Negative.    Genitourinary: Positive for decreased urine volume. Negative for difficulty urinating, dysuria, hematuria and urgency.   Musculoskeletal: Negative.  Negative for back pain, gait problem, neck pain and neck stiffness.   Skin: Positive for wound.  Negative for color change and pallor.   Allergic/Immunologic: Positive for immunocompromised state.   Neurological: Positive for weakness. Negative for dizziness, seizures, light-headedness and headaches.   Psychiatric/Behavioral: Negative for behavioral problems, confusion, decreased concentration, dysphoric mood, hallucinations, sleep disturbance and suicidal ideas. The patient is not nervous/anxious.      Objective:     Vital Signs (Most Recent):  Temp: 98.8 °F (37.1 °C) (12/29/18 0800)  Pulse: 95 (12/29/18 0800)  Resp: 18 (12/29/18 0800)  BP: (!) 140/92 (12/29/18 0800)  SpO2: 98 % (12/29/18 0800) Vital Signs (24h Range):  Temp:  [98.4 °F (36.9 °C)-99.2 °F (37.3 °C)] 98.8 °F (37.1 °C)  Pulse:  [81-95] 95  Resp:  [17-29] 18  SpO2:  [98 %-100 %] 98 %  BP: (126-154)/() 140/92     Weight: 65.8 kg (145 lb 1 oz)  Body mass index is 20.81 kg/m².    Intake/Output - Last 3 Shifts       12/27 0700 - 12/28 0659 12/28 0700 - 12/29 0659 12/29 0700 - 12/30 0659    P.O. 160 460     I.V. (mL/kg)  300 (4.6)     Blood       Other       NG/GT 1320 960     Total Intake(mL/kg) 1480 (22.5) 1720 (26.1)     Urine (mL/kg/hr) 150 (0.1) 400 (0.3)     Other       Stool 0 0     Total Output 150 400     Net +1330 +1320            Stool Occurrence 1 x 2 x           Physical Exam   Constitutional: He is oriented to person, place, and time. He appears well-developed. No distress.   Temporal and distal extremity muscle wasting noted.   HENT:   Head: Normocephalic and atraumatic.   Mouth/Throat: Oropharynx is clear and moist. No oropharyngeal exudate.   Eyes: EOM are normal.   Neck: Normal range of motion. Neck supple. No JVD present.   Cardiovascular: Normal rate, regular rhythm, normal heart sounds and intact distal pulses.   No murmur heard.  Pulmonary/Chest: Effort normal. No respiratory distress. He has decreased breath sounds in the right lower field and the left lower field. He has no wheezes. He exhibits no tenderness.   Abdominal:  Soft. Bowel sounds are normal. He exhibits no distension. There is no tenderness.   Chevron inc clean, dry, intact with steri strips in place     Musculoskeletal: Normal range of motion. He exhibits no edema or tenderness.   Neurological: He is alert and oriented to person, place, and time. He has normal reflexes.   Skin: Skin is warm and dry. Capillary refill takes 2 to 3 seconds. He is not diaphoretic. No pallor.   Psychiatric: He has a normal mood and affect. His behavior is normal. Thought content normal. His affect is not labile. He is not slowed. Cognition and memory are not impaired.   Nursing note and vitals reviewed.      Laboratory:  Immunosuppressants         Stop Route Frequency     mycophenolate mofetil 200 mg/mL suspension 500 mg      -- Oral 2 times daily     tacrolimus capsule 1.5 mg      -- SL 2 times daily        CBC:   Recent Labs   Lab 12/29/18  0648   WBC 10.59   RBC 2.68*   HGB 7.5*   HCT 24.7*      MCV 92   MCH 28.0   MCHC 30.4*     CMP:   Recent Labs   Lab 12/29/18  0648   *   CALCIUM 9.3   ALBUMIN 3.2*   PROT 6.3      K 3.4*   CO2 27      BUN 44*   CREATININE 1.9*   ALKPHOS 144*   ALT 9*   AST 14   BILITOT 0.9     Labs within the past 24 hours have been reviewed.    Diagnostic Results:  Liver US on 12/28/18

## 2018-12-29 NOTE — PROGRESS NOTES
[facility]  Liver Transplant  Progress Note    Patient Name: Femi Enciso  MRN: [id number]  Admission Date: 10/27/2018  Hospital Length of Stay: 63 days  Code Status: Full Code  Primary Care Provider: Primary Doctor No  Post-Operative Day: 48    ORGAN:   LIVER  Disease Etiology: Alcoholic Cirrhosis  Donor Type:    - Brain Death  CDC High Risk:   No  Donor CMV Status:   Donor CMV Status: Positive  Donor HBcAB:   Negative  Donor HCV Status:   Negative  Donor HBV JAVIER: Negative  Donor HCV JAVIER: Negative  Whole or Partial: Whole Liver  Biliary Anastomosis: End to End  Arterial Anatomy: Standard  Subjective:     History of Present Illness:  Femi Enciso is a 53yr old male with PMH of acute ETOH hepatitis and DEL/ATN evolved to ESRD requiring HD (not candidate for KTX). He is now s/p liver transplant (SM induction, DBD, CMV D+/R-). Transplant surgery noted severe collateralization, adrenal gland firmly adhered to liver. Bare area of adrenal gland required several stitches and packing to obtain fair hemostasis (EBL 15L). OR take back  for bleeding from R adrenal bed in AM (significant clot in retroperitoneum, posterior to R hepatic lobe, tract posterior to the R kidney containing significant clot, small bleeding area of retroperitoneal fat) then returned to surgery same day for hemorrhaging closed with wound vac with plans to return to OR 24-48 hours for closure (retroperitoneal /retrorenal/retrocolic hematoma on the right ). Raw retroperitoneal bleeding. Suture ligatures placed, argon beam cautery, evarest placed in retroperitoneum behind cava and right kidney. Significant oozing from upper right adrenal gland, not amenable to ligation, that portion of the adrenal gland was resected with cautery). OR take back  for washout and incisional closure, no significant bleeding or hematoma found. OR cultures   from body fluid grew enterococcus faecium VRE. ID was consulted,  started on  daptomycin for VRE treatment and cefepime/flagyl to cover for IA ty in bile. Patient with significant leukocytosis with peak on 11/22 at 48K prompting drainage of R subphrenic fluid collection, perc drain placed, culture grew VRE. Stroke code called 11/21 for lethargy and unresponsive, CT head without evidence of acute ischemic changes and CTA chest negative, vascular neurology was consulted recommended MRI brain, but patient's AMS improved shortly after event. Nephrology consulted for ESRD requiring HD. Patient overall improved on antibiotic regimen, leukocytosis and AMS improved. He was transferred to TSU on POD #15.     Hospital Course:  Pt c/o CP 12/21. EKG stable from prior. Troponin wnl. CP resolved 12/22. CXR 12/20 showed no detrimental change. Pt hypertensive prior to HD.  Dialysis tolerated well 12/21 with 1.5L taken off. Bps much improved after dialysis, but monitoring closely.    Interval History:  No acute events overnight. US done yesterday to reassess HAS revealed elevated but slightly improved main hepatic artery velocity and mildly improved RIs. Will repeat US in 2 weeks. Last HD 12/26; held off on HD yesterday. Cr 1.9 from 1.8. WilKeo tube in place with tube feedings at goal rate, 50 ml/hr. Pt encouraged to increase po intake also. LFTs stable. H/H decreased today but likely 2/2 to giving albumin yesterday, no need for transfusion at this time. Restarted MMF today. Continue ASA. Pt participating with PT/OT. Pt will transfer to Rehab when medically appropriate and off tube feedings. Monitor.     Scheduled Meds:   aspirin  81 mg Oral Daily    epoetin sherlyn (PROCRIT) injection  50 Units/kg (Dosing Weight) Intravenous Every Mon, Wed, Fri    ergocalciferol  50,000 Units Oral Q7 Days    escitalopram oxalate  20 mg Oral Daily    heparin (porcine)  5,000 Units Subcutaneous Q8H    isavuconazonium sulfate  372 mg Oral Daily    k phos di & mono-sod phos mono  500 mg Oral QID    lidocaine  1 patch  Transdermal Q24H    metoprolol  12.5 mg Per NG tube BID    mycophenolate mofetil  500 mg Oral BID    pantoprazole  40 mg Intravenous BID    sulfamethoxazole-trimethoprim 400-80mg  1 tablet Oral Every Mon, Wed, Fri    tacrolimus  1.5 mg Sublingual BID    ursodiol  300 mg Oral BID    valganciclovir 50 mg/ml  100 mg Oral Once per day on Mon Wed Fri     Continuous Infusions:  PRN Meds:acetaminophen, albuterol-ipratropium, aluminum & magnesium hydroxide-simethicone, artificial tears, bacitracin, bisacodyl, dextrose 50%, dextrose 50%, glucagon (human recombinant), glucose, glucose, heparin (porcine), hydrALAZINE, midodrine, mirtazapine, ondansetron, ramelteon, tiZANidine, traMADol    Review of Systems   Constitutional: Positive for activity change and appetite change (decreased). Negative for fatigue and fever.   HENT: Negative for congestion, facial swelling, sore throat and voice change.    Eyes: Negative for pain, discharge and visual disturbance.   Respiratory: Negative for apnea, cough, choking, chest tightness, shortness of breath and wheezing.    Cardiovascular: Negative for chest pain, palpitations and leg swelling.   Gastrointestinal: Positive for nausea. Negative for abdominal distention, abdominal pain, constipation, diarrhea and vomiting.   Endocrine: Negative.    Genitourinary: Positive for decreased urine volume. Negative for difficulty urinating, dysuria, hematuria and urgency.   Musculoskeletal: Negative.  Negative for back pain, gait problem, neck pain and neck stiffness.   Skin: Positive for wound. Negative for color change and pallor.   Allergic/Immunologic: Positive for immunocompromised state.   Neurological: Positive for weakness. Negative for dizziness, seizures, light-headedness and headaches.   Psychiatric/Behavioral: Negative for behavioral problems, confusion, decreased concentration, dysphoric mood, hallucinations, sleep disturbance and suicidal ideas. The patient is not nervous/anxious.       Objective:     Vital Signs (Most Recent):  Temp: 98.8 °F (37.1 °C) (12/29/18 0800)  Pulse: 95 (12/29/18 0800)  Resp: 18 (12/29/18 0800)  BP: (!) 140/92 (12/29/18 0800)  SpO2: 98 % (12/29/18 0800) Vital Signs (24h Range):  Temp:  [98.4 °F (36.9 °C)-99.2 °F (37.3 °C)] 98.8 °F (37.1 °C)  Pulse:  [81-95] 95  Resp:  [17-29] 18  SpO2:  [98 %-100 %] 98 %  BP: (126-154)/() 140/92     Weight: 65.8 kg (145 lb 1 oz)  Body mass index is 20.81 kg/m².    Intake/Output - Last 3 Shifts       12/27 0700 - 12/28 0659 12/28 0700 - 12/29 0659 12/29 0700 - 12/30 0659    P.O. 160 460     I.V. (mL/kg)  300 (4.6)     Blood       Other       NG/GT 1320 960     Total Intake(mL/kg) 1480 (22.5) 1720 (26.1)     Urine (mL/kg/hr) 150 (0.1) 400 (0.3)     Other       Stool 0 0     Total Output 150 400     Net +1330 +1320            Stool Occurrence 1 x 2 x           Physical Exam   Constitutional: He is oriented to person, place, and time. He appears well-developed. No distress.   Temporal and distal extremity muscle wasting noted.   HENT:   Head: Normocephalic and atraumatic.   Mouth/Throat: Oropharynx is clear and moist. No oropharyngeal exudate.   Eyes: EOM are normal.   Neck: Normal range of motion. Neck supple. No JVD present.   Cardiovascular: Normal rate, regular rhythm, normal heart sounds and intact distal pulses.   No murmur heard.  Pulmonary/Chest: Effort normal. No respiratory distress. He has decreased breath sounds in the right lower field and the left lower field. He has no wheezes. He exhibits no tenderness.   Abdominal: Soft. Bowel sounds are normal. He exhibits no distension. There is no tenderness.   Chevron inc clean, dry, intact with steri strips in place     Musculoskeletal: Normal range of motion. He exhibits no edema or tenderness.   Neurological: He is alert and oriented to person, place, and time. He has normal reflexes.   Skin: Skin is warm and dry. Capillary refill takes 2 to 3 seconds. He is not diaphoretic.  "No pallor.   Psychiatric: He has a normal mood and affect. His behavior is normal. Thought content normal. His affect is not labile. He is not slowed. Cognition and memory are not impaired.   Nursing note and vitals reviewed.      Laboratory:  Immunosuppressants         Stop Route Frequency     mycophenolate mofetil 200 mg/mL suspension 500 mg      -- Oral 2 times daily     tacrolimus capsule 1.5 mg      -- SL 2 times daily        CBC:   Recent Labs   Lab 12/29/18  0648   WBC 10.59   RBC 2.68*   HGB 7.5*   HCT 24.7*      MCV 92   MCH 28.0   MCHC 30.4*     CMP:   Recent Labs   Lab 12/29/18  0648   *   CALCIUM 9.3   ALBUMIN 3.2*   PROT 6.3      K 3.4*   CO2 27      BUN 44*   CREATININE 1.9*   ALKPHOS 144*   ALT 9*   AST 14   BILITOT 0.9     Labs within the past 24 hours have been reviewed.    Diagnostic Results:  Liver US on 12/28/18    Assessment/Plan:     * S/P liver transplant    - LFTs now improving slowly.  T bili was 37.6 day of transplant.  - Drain placed during take back. Drain placed to intra-abdominal abscess on 11/22.   - Fluid from collection noted with enterococcus VRE completed Zyvox treatment.  - Routine 1 month Liver US 12/17 which showed elevated velocity of hepatic artery and low RIs.   - Vascular Surgery consulted. Recommend repeat US in 10-14 days (12/27-12/31) to reassess. Appreciate Vascular recs.  LFTs stable.  - Liver US yesterday to reassess HAS revealed elevated but slightly improved main hepatic artery velocity and mildly improved RIs.   - Will repeat US in 2 weeks.        Chest pain    - Pt c/o "burning" diffuse CP 12/21 AM.  - EKG NSR, stable from prior.  - Troponin wnl.  - Likely GI related pain.        Hypophosphatemia    -  mg qid.         Acute kidney failure with lesion of tubular necrosis    - Cont Dialysis MWF.  - Nephrology following, appreciate recs.  - Monitor.     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       Hypomagnesemia    - Continue to monitor & replace as needed.     Severe malnutrition    - Dietician following. Severe temporal, clavicle muscle depletion noted. Severe subq fat loss  - Nutrition has been poor over the past 24 hours.   - Discussed at length with patient and caregivers that if patient's nutrition status does not improve, will need supplemental nutrition via tube feeds or TPN.  - Appetite stimulant started 12/19.   - Caregivers to bring in outside food for patient.   - Continue calorie count.  - EGD Monday 12/24 as pt c/o poor appetite + burning in chest/esophagus and occasionally vomiting after eating x several weeks.   - EGD 12/24 unremarkable. jhon tube placed afterwards.   - TF started for nutrition 12/24/18. TF at goal rate, 50 ml/hr.  - Prealbumin 7 on 12/26.     Nausea    - Intermittent, worse over past 10 days,  Changed Pepcid to Protonix 12/9/18.  Appetite seems to be slowly improving.    - Encourage multiple, small meals and cont PRN anti-emetics.    - GI consulted.  EGD 12/24 w normal esophagus, erythematous mucosa in the antrum and nodular mucosa in the duodenal bulb w biopsies.  Path pending.  - JHON placed 12/24.  Pt tolerating tube feeds.  Denies nausea w TF.    - GI symptoms now slowly improving.        Anxiety    - Restarted Lexapro as per psych recs, 12/13.   - Monitor.     Delirium    - Pt with intermittent confusion since transplant was improving slowly.   - Pt with some lethargy and confusion on 11/21. Head CT negative.   - AAOx3. More alert and awake on 11/27.   - AAOx4 on 11/29. Seroquel d/c'd 11/30/18.  Trazodone for insomnia ordered w PRN dose.    - Pt then with delirium again. re consulted psych.   - delirium could be d/t rising prograf.  AMS improved since holding Prograf.    - CT head 12/4 with no acute findings.   - Restarted Prograf 12/6- will need to monitor mental status closely.  - 12/10 delirium resolved.     Prolonged Q-T interval on ECG "    -  ms on EKG 12/19.  -  on EKG 12/21.  - Monitor.       Alcohol use disorder, severe, in early remission, dependence    - Monitor.       Decreased appetite    - Continue appetite stimulant.  - GI consulted as pt c/o burning sensation in chest/esophagus + vomiting after eating, passed SLP eval, recommended GI f/u.  - Cont to encourage PO intake. Ordered additional supplements.   - EGD 12/24, unremarkable. prabhakar tube placed and TF started. Increased TF to 45 ml, new goal rate 50 ml/hr.  - Prealbumin 7 on 12/26.     Acute renal failure with acute tubular necrosis superimposed on stage 3 chronic kidney disease    - 2 weeks for DEL prior to transplant.  - Renal function slow to recover post-op.   - Nephrology consulted for management.   - Cont with HD as per nephrology recs.  Apprec recs.    - Lasix 160 mg challenge 11/28 w/ minimal output.    - HD MWF. Tolerated well 12/26.    - Held HD on 12/28.   - Strict I&Os.   - Albumin 25% x 3 doses 12/28.  -Cr 1.9 from 1.8 today, UOP increasing     Intra-abdominal abscess    - Significant increase in WBC post-op.   - OR cultures from 11/18 noted with enterococcus VRE.   - Abdominal CT performed at that time with fluid collection and drain placed on 11/22 for drainage.   - Intra-abdominal abscess culture also with enterococcus VRE.   - ID consulted and pt placed on antibxs for treatment. Pt was on Cefepime, Daptomycin, and Fluconazole for treatment.    - Pt remains with RLQ drains (one JOSE A and one Percutaneous).  One JOSE A removed 11/28.  Perc drain in placed draining tan, clear drainage.  WBC slowly improving.   - Liver US on 11/26 reviewed.   - Abdominal CT performed 11/27 and reviewed. Fluid collection noted with improvement on CT.  ID following and reassured by findings.    - Dapto, Cefepime and Flagyl changed 11/30/18 to Linezolid 600 mg PO q 12 hr x 10 days per ID.     - Added back cefepime after thora.  ID re consulted.  - Completed Zyvox x 10 days per ID thru  12/9/18. Monitor.        Long-term use of immunosuppressant medication    - Cont with prograf. Draw prograf level daily and adjust dose as needed to maintain a therapeutic level.   - restarted MMF on 12/29       At risk for opportunistic infections    - Cont with bactrim and valcyte for protection against opportunistic infections.   - Cont Isavuconazonium for fungal prophylaxis.     Leukocytosis    - See intra-abdominal abscess.  - wbc improving, cont w delirium.    - Repeat cx 12/4 with NGTD.  Completed Linezolid and Cefepime per ID.    - Wbc trended up 12/11- repeat cx 12/11 w NGTD.  - WBC count improving.  No signs of infection.       Adrenal cortical steroids causing adverse effect in therapeutic use    - Monitor.       Prophylactic immunotherapy    - See long term immunosuppression.        Weakness    - Pt remains significantly debilitated.   - PT/OT for strengthening.   - Currently will need SNF for placement and further rehabilitation.   - Pt remains severely debilitated and not strong enough for SNF at this time.    - SNF consult placed 12/17. However, denied for SNF again as not strong enough at this time.  - Rehab consulted.  We now have a contract w [facility] for rehab.  Rehab willing to accept patient when medically appropriate.    - Continue aggressive therapy.     Pleural effusion associated with hepatic disorder    - c/o increased pain w deep breath today to right side.  CXR showed increased opacity in the inferior hemothorax on the right side since 11/26/2018.  Pulse ox 97-99% on RA.  Cont to monitor.   - continues to have fluid to RLL.  WBC remains elevated.    - thoracentesis performed on 11/30. Fluid noted with 4k WBC, 93 SEGS. cx no growth to date.  Cefepime added for treatment.  - Chest CT showing right pleural effusion improved, left w increased pleural effusion.   - Per ID, completed treatment of empyema w Cefepime thru 12/8.  - sats remain 97-99% on RA.  - CXR 12/20 showed left hemidiaphragmatic  margin that is better delineated than on 12/19/2018, consistent with improved aeration in the left lower lobe. + no significant detrimental interval change in the appearance of the chest, with the current examination demonstrating a slightly improved inspiratory depth level.    - denies SOB.  Last CXR 12/22 w improvement.     Type 2 diabetes mellitus without complication    - Endocrine consulted for management. Appreciate recs.   - Hypoglycemia 12/10 requiring D50.    - Repeat cx 12/11 - no growth.  - Blood glucose readings well controlled on tube feeds.     Anemia of chronic disease    - Last liver US 11/27. Evolving peritransplant hematomas with anechoic fluid collection adjacent to the right hepatic lobe.  Small volume perihepatic free fluid.  - Chest/Abd/pelvis CT 12/4 - no fluid to be drainged per transplant surgeon.  No source of bleeding.    - Last transfused 12/26 w HD w appropriate response.  Monitor with daily labs.          VTE Risk Mitigation (From admission, onward)        Ordered     heparin (porcine) injection 5,000 Units  Every 8 hours      12/24/18 0742     heparin (porcine) injection 1,000 Units  As needed (PRN)      12/12/18 1731     IP VTE HIGH RISK PATIENT  Once      11/11/18 1208          The patients clinical status was discussed at multidisplinary rounds, involving transplant surgery, transplant medicine, pharmacy, nursing, nutrition, and social work    Discharge Planning:  No Patient Care Coordination Note on file.      Krystin Arredondo PA-C  Liver Transplant  [facility]

## 2018-12-29 NOTE — PROGRESS NOTES
[facility]  Nephrology  Progress Note    Patient Name: Femi Enciso  MRN: [id number]  Admission Date: 10/27/2018  Hospital Length of Stay: 63 days  Attending Provider: Femi Patel MD   Primary Care Physician: Primary Doctor No  Principal Problem:S/P liver transplant    Subjective:     HPI: 52 y/o man with DM2 presents to the ED with family for liver failure (likely due to EtOH abundant history of drinking - diagnosed in Sept 2018 in [place]).  He reports jaundice, generalized weakness, nausea, diarrhea, and decreased appetite since Sept 2018.  He is from [place], [place], and Dr. Sharma (patients physician) recommended bringing him to hospital for evaluation.  Patient denies any fever, chills, vomiting, chest pain, palpitations, SOB, abdominal pain.      Nephrology consulted for evaluation/management Adri.     Interval History: last HD 12/26, held yesterday as there seemed to be recovery, today sCr 1.9 (1.8) yesterday    Review of patient's allergies indicates:   Allergen Reactions    Penicillins Nausea And Vomiting and Rash     Full body rash from cefepime as well     Current Facility-Administered Medications   Medication Frequency    acetaminophen tablet 650 mg Q6H PRN    albuterol-ipratropium 2.5 mg-0.5 mg/3 mL nebulizer solution 3 mL Q4H PRN    artificial tears 0.5 % ophthalmic solution 1 drop PRN    aspirin chewable tablet 81 mg Daily    bacitracin ointment PRN    bisacodyl suppository 10 mg Daily PRN    dextrose 50% injection 12.5 g PRN    dextrose 50% injection 25 g PRN    epoetin sherlyn injection 4,200 Units Every Mon, Wed, Fri    ergocalciferol capsule 50,000 Units Q7 Days    escitalopram oxalate 5 mg/5 mL solution 20 mg Daily    glucagon (human recombinant) injection 1 mg PRN    glucose chewable tablet 16 g PRN    glucose chewable tablet 24 g PRN    heparin (porcine) injection 1,000 Units PRN    heparin (porcine) injection 5,000 Units Q8H    hydrALAZINE injection 10 mg Q6H PRN     isavuconazonium sulfate Cap 372 mg Daily    k phos di & mono-sod phos mono 250 mg tablet 2 tablet QID    lidocaine 5 % patch 1 patch Q24H    metoprolol 12.5mg/1.25mL oral solution 12.5 mg BID    midodrine tablet 10 mg TID PRN    mirtazapine tablet 7.5 mg Nightly PRN    mycophenolate mofetil 200 mg/mL suspension 500 mg BID    ondansetron injection 4 mg Q6H PRN    pantoprazole injection 40 mg BID    ramelteon tablet 8 mg Nightly PRN    sulfamethoxazole-trimethoprim 400-80mg per tablet 1 tablet Every Mon, Wed, Fri    tacrolimus capsule 1.5 mg BID    tiZANidine tablet 2 mg Q8H PRN    traMADol tablet 50 mg Q6H PRN    ursodiol oral suspension (conc: 60 mg/mL) 300 mg BID    valganciclovir 50 mg/ml oral solution 100 mg Once per day on Mon Wed Fri       Objective:     Vital Signs (Most Recent):  Temp: 98.8 °F (37.1 °C) (12/29/18 0800)  Pulse: 95 (12/29/18 0800)  Resp: 18 (12/29/18 0800)  BP: (!) 140/92 (12/29/18 0800)  SpO2: 98 % (12/29/18 0800)  O2 Device (Oxygen Therapy): room air (12/29/18 0800) Vital Signs (24h Range):  Temp:  [98.4 °F (36.9 °C)-99.2 °F (37.3 °C)] 98.8 °F (37.1 °C)  Pulse:  [81-95] 95  Resp:  [17-29] 18  SpO2:  [98 %-100 %] 98 %  BP: (126-154)/() 140/92     Weight: 65.8 kg (145 lb 1 oz) (12/27/18 0500)  Body mass index is 20.81 kg/m².  Body surface area is 1.8 meters squared.    I/O last 3 completed shifts:  In: 2595 [P.O.:520; I.V.:300; NG/GT:1775]  Out: 450 [Urine:450]    Physical Exam   Constitutional: He is oriented to person, place, and time.   HENT:   Head: Atraumatic.   Neck: No JVD present.   Cardiovascular: Normal rate and regular rhythm. Exam reveals no friction rub.   Pulmonary/Chest: Effort normal and breath sounds normal.   Abdominal: Soft. He exhibits distension.   Musculoskeletal: He exhibits no edema.   Neurological: He is alert and oriented to person, place, and time.   Skin: Skin is warm.   Psychiatric: He has a normal mood and affect.           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     Increasing urine output and getting clearer, 400ccs recorded, but may have been more, no evidence for significant volume overload, lytes and acid/base are fine, delta sCr only 0.1    Plan:  -hold on RRT and continue monitoring UOP and serial RFPs, but hopeful he may not need any further treatments as long as there are no other insults                   Thank you for your consult. I will follow-up with patient. Please contact us if you have any additional questions.    Petar Hoyos MD  Nephrology  [facility]    ATTENDING PHYSICIAN ATTESTATION  I have personally interviewed and examined the patient. I thoroughly reviewed the demographic, clinical, laboratorial and imaging information available in medical records. I agree with the assessment and recommendations provided by the subspecialty resident. Dr. Hoyos was under my supervision.

## 2018-12-29 NOTE — ASSESSMENT & PLAN NOTE
- 2 weeks for DEL prior to transplant.  - Renal function slow to recover post-op.   - Nephrology consulted for management.   - Cont with HD as per nephrology recs.  Apprec recs.    - Lasix 160 mg challenge 11/28 w/ minimal output.    - HD MWF. Tolerated well 12/26.    - Held HD on 12/28.   - Strict I&Os.   - Albumin 25% x 3 doses 12/28.  -Cr 1.9 from 1.8 today, UOP increasing

## 2018-12-29 NOTE — ASSESSMENT & PLAN NOTE
- LFTs now improving slowly.  T bili was 37.6 day of transplant.  - Drain placed during take back. Drain placed to intra-abdominal abscess on 11/22.   - Fluid from collection noted with enterococcus VRE completed Zyvox treatment.  - Routine 1 month Liver US 12/17 which showed elevated velocity of hepatic artery and low RIs.   - Vascular Surgery consulted. Recommend repeat US in 10-14 days (12/27-12/31) to reassess. Appreciate Vascular recs.  LFTs stable.  - Liver US yesterday to reassess HAS revealed elevated but slightly improved main hepatic artery velocity and mildly improved RIs.   - Will repeat US in 2 weeks.

## 2018-12-29 NOTE — PLAN OF CARE
Problem: Patient Care Overview  Goal: Plan of Care Review  Outcome: Ongoing (interventions implemented as appropriate)  Patient aaox4. Up with 1-2 assistance and use of bedside commode. Ambulation encouraged. Family at bedside, attentive to patient's needs. Regular diet, JHON with tubefeeds at 50ml/hr continued. Eating PO encouraged. Complaint of sore/burning throat when taking anything PO. 1x prn given with mild relief. Patient willing to take small medications PO but requesting larger ones be crushed and placed through JHON. Standard precautions maintained.

## 2018-12-30 PROBLEM — E87.6 HYPOKALEMIA: Status: ACTIVE | Noted: 2018-12-30

## 2018-12-30 PROBLEM — R13.19 OTHER DYSPHAGIA: Status: ACTIVE | Noted: 2018-12-30

## 2018-12-30 LAB
ALBUMIN SERPL BCP-MCNC: 2.6 G/DL
ALP SERPL-CCNC: 160 U/L
ALT SERPL W/O P-5'-P-CCNC: 10 U/L
ANION GAP SERPL CALC-SCNC: 10 MMOL/L
AST SERPL-CCNC: 15 U/L
BASOPHILS # BLD AUTO: 0.23 K/UL
BASOPHILS NFR BLD: 2.1 %
BILIRUB SERPL-MCNC: 1 MG/DL
BUN SERPL-MCNC: 51 MG/DL
CALCIUM SERPL-MCNC: 8.9 MG/DL
CHLORIDE SERPL-SCNC: 103 MMOL/L
CO2 SERPL-SCNC: 27 MMOL/L
CREAT SERPL-MCNC: 1.8 MG/DL
DIFFERENTIAL METHOD: ABNORMAL
EOSINOPHIL # BLD AUTO: 0.9 K/UL
EOSINOPHIL NFR BLD: 8.4 %
ERYTHROCYTE [DISTWIDTH] IN BLOOD BY AUTOMATED COUNT: 15.6 %
EST. GFR  (AFRICAN AMERICAN): 48.6 ML/MIN/1.73 M^2
EST. GFR  (NON AFRICAN AMERICAN): 42 ML/MIN/1.73 M^2
GLUCOSE SERPL-MCNC: 161 MG/DL
HCT VFR BLD AUTO: 26.4 %
HGB BLD-MCNC: 8 G/DL
IMM GRANULOCYTES # BLD AUTO: 0.23 K/UL
IMM GRANULOCYTES NFR BLD AUTO: 2.1 %
LYMPHOCYTES # BLD AUTO: 0.9 K/UL
LYMPHOCYTES NFR BLD: 7.8 %
MAGNESIUM SERPL-MCNC: 1.3 MG/DL
MCH RBC QN AUTO: 27.8 PG
MCHC RBC AUTO-ENTMCNC: 30.3 G/DL
MCV RBC AUTO: 92 FL
MONOCYTES # BLD AUTO: 1.2 K/UL
MONOCYTES NFR BLD: 11.4 %
NEUTROPHILS # BLD AUTO: 7.4 K/UL
NEUTROPHILS NFR BLD: 68.2 %
NRBC BLD-RTO: 0 /100 WBC
PHOSPHATE SERPL-MCNC: 2.7 MG/DL
PLATELET # BLD AUTO: 287 K/UL
PMV BLD AUTO: 12.4 FL
POCT GLUCOSE: 135 MG/DL (ref 70–110)
POCT GLUCOSE: 156 MG/DL (ref 70–110)
POCT GLUCOSE: 177 MG/DL (ref 70–110)
POCT GLUCOSE: 180 MG/DL (ref 70–110)
POTASSIUM SERPL-SCNC: 3.2 MMOL/L
PROT SERPL-MCNC: 6 G/DL
RBC # BLD AUTO: 2.88 M/UL
SODIUM SERPL-SCNC: 140 MMOL/L
TACROLIMUS BLD-MCNC: 4.8 NG/ML
WBC # BLD AUTO: 10.89 K/UL

## 2018-12-30 PROCEDURE — 99233 PR SUBSEQUENT HOSPITAL CARE,LEVL III: ICD-10-PCS | Mod: ,,, | Performed by: PHYSICIAN ASSISTANT

## 2018-12-30 PROCEDURE — 63600175 PHARM REV CODE 636 W HCPCS: Performed by: NURSE PRACTITIONER

## 2018-12-30 PROCEDURE — 25000003 PHARM REV CODE 250: Performed by: NURSE PRACTITIONER

## 2018-12-30 PROCEDURE — 63600175 PHARM REV CODE 636 W HCPCS: Performed by: PHYSICIAN ASSISTANT

## 2018-12-30 PROCEDURE — 94664 DEMO&/EVAL PT USE INHALER: CPT

## 2018-12-30 PROCEDURE — 84100 ASSAY OF PHOSPHORUS: CPT

## 2018-12-30 PROCEDURE — 20600001 HC STEP DOWN PRIVATE ROOM

## 2018-12-30 PROCEDURE — C9113 INJ PANTOPRAZOLE SODIUM, VIA: HCPCS | Performed by: NURSE PRACTITIONER

## 2018-12-30 PROCEDURE — 27000646 HC AEROBIKA DEVICE

## 2018-12-30 PROCEDURE — 99900035 HC TECH TIME PER 15 MIN (STAT)

## 2018-12-30 PROCEDURE — 25000003 PHARM REV CODE 250: Performed by: PHYSICIAN ASSISTANT

## 2018-12-30 PROCEDURE — 99233 SBSQ HOSP IP/OBS HIGH 50: CPT | Mod: ,,, | Performed by: PHYSICIAN ASSISTANT

## 2018-12-30 PROCEDURE — 36415 COLL VENOUS BLD VENIPUNCTURE: CPT

## 2018-12-30 PROCEDURE — 25000003 PHARM REV CODE 250: Performed by: STUDENT IN AN ORGANIZED HEALTH CARE EDUCATION/TRAINING PROGRAM

## 2018-12-30 PROCEDURE — 92610 EVALUATE SWALLOWING FUNCTION: CPT

## 2018-12-30 PROCEDURE — 94761 N-INVAS EAR/PLS OXIMETRY MLT: CPT

## 2018-12-30 PROCEDURE — 80053 COMPREHEN METABOLIC PANEL: CPT

## 2018-12-30 PROCEDURE — 83735 ASSAY OF MAGNESIUM: CPT

## 2018-12-30 PROCEDURE — 80197 ASSAY OF TACROLIMUS: CPT

## 2018-12-30 PROCEDURE — 85025 COMPLETE CBC W/AUTO DIFF WBC: CPT

## 2018-12-30 RX ORDER — MAGNESIUM SULFATE HEPTAHYDRATE 40 MG/ML
2 INJECTION, SOLUTION INTRAVENOUS ONCE
Status: DISCONTINUED | OUTPATIENT
Start: 2018-12-30 | End: 2018-12-30

## 2018-12-30 RX ORDER — POTASSIUM CHLORIDE 20 MEQ/15ML
40 SOLUTION ORAL ONCE
Status: COMPLETED | OUTPATIENT
Start: 2018-12-30 | End: 2018-12-30

## 2018-12-30 RX ORDER — FLUCONAZOLE 40 MG/ML
200 POWDER, FOR SUSPENSION ORAL DAILY
Status: DISCONTINUED | OUTPATIENT
Start: 2018-12-30 | End: 2019-01-08

## 2018-12-30 RX ORDER — MAGNESIUM SULFATE HEPTAHYDRATE 40 MG/ML
2 INJECTION, SOLUTION INTRAVENOUS
Status: COMPLETED | OUTPATIENT
Start: 2018-12-30 | End: 2018-12-30

## 2018-12-30 RX ADMIN — HEPARIN SODIUM 5000 UNITS: 5000 INJECTION, SOLUTION INTRAVENOUS; SUBCUTANEOUS at 02:12

## 2018-12-30 RX ADMIN — TRAMADOL HYDROCHLORIDE 50 MG: 50 TABLET, FILM COATED ORAL at 03:12

## 2018-12-30 RX ADMIN — HEPARIN SODIUM 5000 UNITS: 5000 INJECTION, SOLUTION INTRAVENOUS; SUBCUTANEOUS at 06:12

## 2018-12-30 RX ADMIN — DIBASIC SODIUM PHOSPHATE, MONOBASIC POTASSIUM PHOSPHATE AND MONOBASIC SODIUM PHOSPHATE 2 TABLET: 852; 155; 130 TABLET ORAL at 08:12

## 2018-12-30 RX ADMIN — MAGNESIUM SULFATE IN WATER 2 G: 40 INJECTION, SOLUTION INTRAVENOUS at 12:12

## 2018-12-30 RX ADMIN — URSODIOL 300 MG: 300 CAPSULE ORAL at 08:12

## 2018-12-30 RX ADMIN — FLUCONAZOLE 200 MG: 40 POWDER, FOR SUSPENSION ORAL at 11:12

## 2018-12-30 RX ADMIN — DIBASIC SODIUM PHOSPHATE, MONOBASIC POTASSIUM PHOSPHATE AND MONOBASIC SODIUM PHOSPHATE 2 TABLET: 852; 155; 130 TABLET ORAL at 09:12

## 2018-12-30 RX ADMIN — LIDOCAINE 1 PATCH: 50 PATCH CUTANEOUS at 03:12

## 2018-12-30 RX ADMIN — PANTOPRAZOLE SODIUM 40 MG: 40 INJECTION, POWDER, LYOPHILIZED, FOR SOLUTION INTRAVENOUS at 08:12

## 2018-12-30 RX ADMIN — TIZANIDINE 2 MG: 2 TABLET ORAL at 09:12

## 2018-12-30 RX ADMIN — Medication 12.5 MG: at 09:12

## 2018-12-30 RX ADMIN — Medication 12.5 MG: at 08:12

## 2018-12-30 RX ADMIN — ASPIRIN 81 MG CHEWABLE TABLET 81 MG: 81 TABLET CHEWABLE at 08:12

## 2018-12-30 RX ADMIN — PANTOPRAZOLE SODIUM 40 MG: 40 INJECTION, POWDER, LYOPHILIZED, FOR SOLUTION INTRAVENOUS at 09:12

## 2018-12-30 RX ADMIN — TACROLIMUS 1 MG: 1 CAPSULE ORAL at 08:12

## 2018-12-30 RX ADMIN — Medication 500 MG: at 09:12

## 2018-12-30 RX ADMIN — ESCITALOPRAM OXALATE 20 MG: 5 SOLUTION ORAL at 08:12

## 2018-12-30 RX ADMIN — POTASSIUM CHLORIDE 40 MEQ: 20 SOLUTION ORAL at 11:12

## 2018-12-30 RX ADMIN — MAGNESIUM SULFATE IN WATER 2 G: 40 INJECTION, SOLUTION INTRAVENOUS at 08:12

## 2018-12-30 RX ADMIN — Medication 500 MG: at 08:12

## 2018-12-30 RX ADMIN — HEPARIN SODIUM 5000 UNITS: 5000 INJECTION, SOLUTION INTRAVENOUS; SUBCUTANEOUS at 09:12

## 2018-12-30 RX ADMIN — TACROLIMUS 1 MG: 1 CAPSULE ORAL at 05:12

## 2018-12-30 NOTE — ASSESSMENT & PLAN NOTE
- Pt remains significantly debilitated.   - PT/OT for strengthening.   - Currently will need SNF for placement and further rehabilitation.   - Pt remains severely debilitated and not strong enough for SNF at this time.    - SNF consult placed 12/17. However, denied for SNF again as not strong enough at this time.  - Rehab consulted.  We now have a contract w [facility] for rehab.  Rehab willing to accept patient when medically appropriate.    - Continue aggressive therapy.

## 2018-12-30 NOTE — PLAN OF CARE
Problem: SLP Goal  Goal: SLP Goal  Speech Language Pathology Goals  Goals expected to be met by 1/6  1. Pt will tolerate thin liquids without overt s/s aspiration.   2. Pt will verbalize swallow precs indptly.       Outcome: Ongoing (interventions implemented as appropriate)  Bedside swallow assessment completed.  Oropharyngeal phases of swallow appear WFL.  Swallow precautions and compensatory strategies reviewed.  SLP to f/u x1 to ensure recall of strategies indptly.  Pt reports ongoing nausea with any solid intake. KEVIN Martinez, MARGARET/SLP  12/30/2018

## 2018-12-30 NOTE — ASSESSMENT & PLAN NOTE
-pt reports difficulty swallowing pills  -SLP consult, appreciate recs  -switched PO meds that pt is unable to swallow to liquid/IV

## 2018-12-30 NOTE — ASSESSMENT & PLAN NOTE
- 2 weeks for DEL prior to transplant.  - Renal function slow to recover post-op.   - Nephrology consulted for management.   - Cont with HD as per nephrology recs.  Apprec recs.    - Lasix 160 mg challenge 11/28 w/ minimal output.    - HD MWF. Tolerated well 12/26.    - Held HD on 12/28.   - Strict I&Os.   - Albumin 25% x 3 doses 12/28.  -Cr 1.8 from 1.9 today, UOP increasing

## 2018-12-30 NOTE — PT/OT/SLP EVAL
Speech Language Pathology Evaluation  Bedside Swallow    Patient Name:  Femi Enciso   MRN:  [id number]  Admitting Diagnosis: S/P liver transplant    Recommendations:                 General Recommendations:  Diet follow up x1  Diet recommendations:  Regular, Thin   Aspiration Precautions: 1 bite/sip at a time, Alternating bites/sips, Avoid talking while eating, Double swallow with each bite/sip, Feed only when awake/alert, HOB to 90 degrees, Meds whole buried in puree, Small bites/sips and Strict aspiration precautions   General Precautions: Standard, aspiration, fall  Communication strategies:  none    History:     Past Medical History:   Diagnosis Date    Liver cirrhosis, alcoholic 09/30/2018       Past Surgical History:   Procedure Laterality Date    EGD (ESOPHAGOGASTRODUODENOSCOPY) N/A 12/24/2018    Performed by Duarte Jules MD at [facility] ENDO (2ND FLR)    EGD (ESOPHAGOGASTRODUODENOSCOPY) N/A 11/6/2018    Performed by Duarte Jules MD at [facility] END[facility] (2ND FLR)    INSERTION, CATHETER, CENTRAL VENOUS, HEMODIALYSIS, TUNNELED N/A 11/7/2018    Performed by Brock Gonzalez MD at [facility] CATH LAB    LAPAROTOMY, EXPLORATORY N/A 11/16/2018    Performed [facility]    LAPAROTOMY, EXPLORATORY, AFTER LIVER TRANSPLANT N/A 11/16/2018    Performed by Amadou Lester Jr., MD at [facility] OR [address] [place] [address]    LAPAROTOMY, EXPLORATORY, AFTER LIVER TRANSPLANT, LIKELY  ABDOMINAL CLOSURE N/A 11/18/2018    Performed by [facility]adou Lester Jr[facility]    TRANSPLANT, LIVER N/A 11/11/2018    Performed by Shmuel Leary MD at [facility] OR [address] [place] [address]       Social History: Patient known to SLP services, multiple swallow assessments this admission.  Pt denied difficulty swallowing.  Pt continues to report nausea/stomach discomfort when eating solids.  He didn't report odynophagia today.  He did report globus sensation when swallowing medication yesterday though gestured to right above R clavicle.   "    Subjective     "I don't think it's my swallow.  It goes down fine but it comes back up."      Pain/Comfort:  · Pain Rating 1: 0/10  · Pain Rating Post-Intervention 1: 0/10    Objective:     Oral Musculature Evaluation  · Oral Musculature: WFL  · Dentition: present and adequate  · Secretion Management: adequate  · Mucosal Quality: adequate  · Mandibular Strength and Mobility: WFL  · Oral Labial Strength and Mobility: WFL  · Lingual Strength and Mobility: WFL  · Buccal Strength and Mobility: WFL  · Volitional Cough: adequate  · Volitional Swallow: timley, adequate hyolaryngeal rise palpated  · Voice Prior to PO Intake: clear    Bedside Swallow Eval:   Consistencies Assessed:  · Thin liquids via johnna;e x5  · Solids 1 bite of a kit keri     Oral Phase:   · WFL    Pharyngeal Phase:   · coughing/choking-delay x1 on initial sip of thin, pt talking during swallow.   · No overt s/s aspiration with remaining sips/solid trial  · Pt refused other po trials 2/2 stomach discomfort  · Swallow response appeared fairly timely with adequate hyolaryngeal rise palpated  · No change in vocal quality     Compensatory Strategies  · None    Treatment: Education provided re: role of SLP, POC diet recs, swallow precs, and compensatory strategies to aid clearance of medications when taken orally including whole in puree, multiple effortful swallows and liquid wash.  Pt and family member verbalized understanding and agreement.  Pt's nurse alerted re: results and recs.   White board updated.       Assessment:     Femi Enciso is a 53 y.o. male with an SLP diagnosis of oropharyngeal phases of swallow WFL.  SLP to follow up to ensure compliance with aspiration precautions.       Goals:   Multidisciplinary Problems     SLP Goals        Problem: SLP Goal    Goal Priority Disciplines Outcome   SLP Goal   (Resolved)     SLP Resolved for Upgrade   Description:  Speech Language Pathology Goals  Goals expected to be met by 11/30/2018  1. Pt will " participate in ongoing swallow assessment MET  2. Pt will tolerate regular diet with thin liquids, MOD I MET  3. Educate Pt and family on S/S aspiration                   Problem: SLP Goal    Goal Priority Disciplines Outcome   SLP Goal     SLP Ongoing (interventions implemented as appropriate)   Description:  Speech Language Pathology Goals  Goals expected to be met by 1/6  1. Pt will tolerate thin liquids without overt s/s aspiration.   2. Pt will verbalize swallow precs indptly.                   Multidisciplinary Problems (Resolved)        Problem: SLP Goal    Goal Priority Disciplines Outcome   SLP Goal   (Resolved)     SLP Outcome(s) achieved   Description:  Speech-Language Pathology Goals  Goals to be met by 12/3/18  1. Patient will tolerate REGULAR DIET/THIN LIQUIDS with no s/s of aspiration.  2. Educate patient and family regarding risks/warning signs of aspiration.                    Plan:     · Patient to be seen:  2 x/week   · Plan of Care expires:  01/29/19  · Plan of Care reviewed with:  patient, family   · SLP Follow-Up:  Yes       Discharge recommendations:  (home)   Barriers to Discharge:  Level of Skilled Assistance Needed      Time Tracking:     SLP Treatment Date:   12/30/18  Speech Start Time:  1108  Speech Stop Time:  1118     Speech Total Time (min):  10 min    Billable Minutes: Eval Swallow and Oral Function 10    KEVIN Martinez, CCC-SLP  12/30/2018

## 2018-12-30 NOTE — PLAN OF CARE
Problem: Patient Care Overview  Goal: Plan of Care Review  Outcome: Ongoing (interventions implemented as appropriate)  Patient aaox4. Up with 1-2 assistance and use of bedside commode. Ambulation encouraged. Family at bedside, attentive to patient's needs. Regular diet, JHON with tubefeeds at 50ml/hr continued.accuchecks ac/hs.  Eating PO encouraged. Complaint of sore/burning at base of throat when taking anything PO. Patient willing to take small medications PO but requesting larger ones be crushed and placed through JHON. Seen by speech today; recommend large pills placed in puree for swallowing. Right arm midline. Magnesium 1.3 this morning, replaced with 4g. Potassium 3.2, replaced PO. Standard precautions maintained.

## 2018-12-30 NOTE — PROGRESS NOTES
[facility]  Liver Transplant  Progress Note    Patient Name: Femi Enciso  MRN: [id number]  Admission Date: 10/27/2018  Hospital Length of Stay: 64 days  Code Status: Full Code  Primary Care Provider: Primary Doctor No  Post-Operative Day: 49    ORGAN:   LIVER  Disease Etiology: Alcoholic Cirrhosis  Donor Type:    - Brain Death  CDC High Risk:   No  Donor CMV Status:   Donor CMV Status: Positive  Donor HBcAB:   Negative  Donor HCV Status:   Negative  Donor HBV JAVIER: Negative  Donor HCV JAVIER: Negative  Whole or Partial: Whole Liver  Biliary Anastomosis: End to End  Arterial Anatomy: Standard  Subjective:     History of Present Illness:  Femi Enciso is a 53yr old male with PMH of acute ETOH hepatitis and DEL/ATN evolved to ESRD requiring HD (not candidate for KTX). He is now s/p liver transplant (SM induction, DBD, CMV D+/R-). Transplant surgery noted severe collateralization, adrenal gland firmly adhered to liver. Bare area of adrenal gland required several stitches and packing to obtain fair hemostasis (EBL 15L). OR take back  for bleeding from R adrenal bed in AM (significant clot in retroperitoneum, posterior to R hepatic lobe, tract posterior to the R kidney containing significant clot, small bleeding area of retroperitoneal fat) then returned to surgery same day for hemorrhaging closed with wound vac with plans to return to OR 24-48 hours for closure (retroperitoneal /retrorenal/retrocolic hematoma on the right ). Raw retroperitoneal bleeding. Suture ligatures placed, argon beam cautery, evarest placed in retroperitoneum behind cava and right kidney. Significant oozing from upper right adrenal gland, not amenable to ligation, that portion of the adrenal gland was resected with cautery). OR take back  for washout and incisional closure, no significant bleeding or hematoma found. OR cultures   from body fluid grew enterococcus faecium VRE. ID was consulted,  started on  daptomycin for VRE treatment and cefepime/flagyl to cover for IA ty in bile. Patient with significant leukocytosis with peak on 11/22 at 48K prompting drainage of R subphrenic fluid collection, perc drain placed, culture grew VRE. Stroke code called 11/21 for lethargy and unresponsive, CT head without evidence of acute ischemic changes and CTA chest negative, vascular neurology was consulted recommended MRI brain, but patient's AMS improved shortly after event. Nephrology consulted for ESRD requiring HD. Patient overall improved on antibiotic regimen, leukocytosis and AMS improved. He was transferred to TSU on POD #15.     Hospital Course:  Pt c/o CP 12/21. EKG stable from prior. Troponin wnl. CP resolved 12/22. CXR 12/20 showed no detrimental change. Pt hypertensive prior to HD.  Dialysis tolerated well 12/21 with 1.5L taken off. Bps much improved after dialysis, but monitoring closely.    Interval History:  No acute events overnight. Cr 1.8 from 1.9. Exchange tube in place with tube feedings at goal rate, 50 ml/hr. Pt encouraged to increase po intake. Pt reports difficulty swallowing pills today and says he is unable to take his isavuconazole. Consulted SLP. Switched isavuconazole to liquid diflucan. Mg and K both low today, replaced accordingly. Continue ASA. Pt participating with PT/OT. Pt will transfer to Rehab when medically appropriate and off tube feedings. Monitor.     Scheduled Meds:   aspirin  81 mg Oral Daily    epoetin sherlyn (PROCRIT) injection  50 Units/kg (Dosing Weight) Intravenous Every Mon, Wed, Fri    ergocalciferol  50,000 Units Oral Q7 Days    escitalopram oxalate  20 mg Oral Daily    fluconazole 40 mg/ml  200 mg Oral Daily    heparin (porcine)  5,000 Units Subcutaneous Q8H    k phos di & mono-sod phos mono  500 mg Oral BID    lidocaine  1 patch Transdermal Q24H    magnesium sulfate IVPB  2 g Intravenous Q2H    metoprolol  12.5 mg Per NG tube BID    mycophenolate mofetil  500 mg Oral BID     pantoprazole  40 mg Intravenous BID    potassium chloride 10%  40 mEq Oral Once    sulfamethoxazole-trimethoprim 400-80mg  1 tablet Oral Every Mon, Wed, Fri    tacrolimus  1 mg Sublingual BID    valganciclovir 50 mg/ml  100 mg Oral Once per day on Mon Wed Fri     Continuous Infusions:  PRN Meds:acetaminophen, albuterol-ipratropium, aluminum & magnesium hydroxide-simethicone, artificial tears, bacitracin, bisacodyl, dextrose 50%, dextrose 50%, glucagon (human recombinant), glucose, glucose, heparin (porcine), hydrALAZINE, midodrine, mirtazapine, ondansetron, ramelteon, tiZANidine, traMADol    Review of Systems   Constitutional: Positive for activity change and appetite change (decreased). Negative for fatigue and fever.   HENT: Negative for congestion, facial swelling, sore throat and voice change.    Eyes: Negative for pain, discharge and visual disturbance.   Respiratory: Negative for apnea, cough, choking, chest tightness, shortness of breath and wheezing.    Cardiovascular: Negative for chest pain, palpitations and leg swelling.   Gastrointestinal: Positive for nausea. Negative for abdominal distention, abdominal pain, constipation, diarrhea and vomiting.   Endocrine: Negative.    Genitourinary: Positive for decreased urine volume. Negative for difficulty urinating, dysuria, hematuria and urgency.   Musculoskeletal: Negative.  Negative for back pain, gait problem, neck pain and neck stiffness.   Skin: Positive for wound. Negative for color change and pallor.   Allergic/Immunologic: Positive for immunocompromised state.   Neurological: Positive for weakness. Negative for dizziness, seizures, light-headedness and headaches.   Psychiatric/Behavioral: Negative for behavioral problems, confusion, decreased concentration, dysphoric mood, hallucinations, sleep disturbance and suicidal ideas. The patient is not nervous/anxious.      Objective:     Vital Signs (Most Recent):  Temp: 98.3 °F (36.8 °C) (12/30/18  0745)  Pulse: 88 (12/30/18 0745)  Resp: 20 (12/30/18 0745)  BP: 135/88 (12/30/18 0745)  SpO2: 99 % (12/30/18 0745) Vital Signs (24h Range):  Temp:  [98.3 °F (36.8 °C)-99.3 °F (37.4 °C)] 98.3 °F (36.8 °C)  Pulse:  [85-94] 88  Resp:  [18-33] 20  SpO2:  [98 %-100 %] 99 %  BP: (123-135)/(81-88) 135/88     Weight: 65.8 kg (145 lb 1 oz)  Body mass index is 20.81 kg/m².    Intake/Output - Last 3 Shifts       12/28 0700 - 12/29 0659 12/29 0700 - 12/30 0659 12/30 0700 - 12/31 0659    P.O. 460 650     I.V. (mL/kg) 300 (4.6)  50 (0.8)    NG/ 1750 420    Total Intake(mL/kg) 1720 (26.1) 2400 (36.5) 470 (7.1)    Urine (mL/kg/hr) 400 (0.3) 270 (0.2)     Stool 0 0     Total Output 400 270     Net +1320 +2130 +470           Stool Occurrence 2 x 1 x           Physical Exam   Constitutional: He is oriented to person, place, and time. He appears well-developed. No distress.   Temporal and distal extremity muscle wasting noted.   HENT:   Head: Normocephalic and atraumatic.   Mouth/Throat: Oropharynx is clear and moist. No oropharyngeal exudate.   Eyes: EOM are normal.   Neck: Normal range of motion. Neck supple. No JVD present.   Cardiovascular: Normal rate, regular rhythm, normal heart sounds and intact distal pulses.   No murmur heard.  Pulmonary/Chest: Effort normal. No respiratory distress. He has decreased breath sounds in the right lower field and the left lower field. He has no wheezes. He exhibits no tenderness.   Abdominal: Soft. Bowel sounds are normal. He exhibits no distension. There is no tenderness.   Chevron inc clean, dry, intact with steri strips in place     Musculoskeletal: Normal range of motion. He exhibits no edema or tenderness.   Neurological: He is alert and oriented to person, place, and time. He has normal reflexes.   Skin: Skin is warm and dry. Capillary refill takes 2 to 3 seconds. He is not diaphoretic. No pallor.   Psychiatric: He has a normal mood and affect. His behavior is normal. Thought content  "normal. His affect is not labile. He is not slowed. Cognition and memory are not impaired.   Nursing note and vitals reviewed.      Laboratory:  Immunosuppressants         Stop Route Frequency     tacrolimus capsule 1 mg      -- SL 2 times daily     mycophenolate mofetil 200 mg/mL suspension 500 mg      -- Oral 2 times daily        CBC:   Recent Labs   Lab 12/30/18  0620   WBC 10.89   RBC 2.88*   HGB 8.0*   HCT 26.4*      MCV 92   MCH 27.8   MCHC 30.3*     CMP:   Recent Labs   Lab 12/30/18  0620   *   CALCIUM 8.9   ALBUMIN 2.6*   PROT 6.0      K 3.2*   CO2 27      BUN 51*   CREATININE 1.8*   ALKPHOS 160*   ALT 10   AST 15   BILITOT 1.0     Labs within the past 24 hours have been reviewed.    Diagnostic Results:  None    Assessment/Plan:     * S/P liver transplant    - LFTs now improving slowly.  T bili was 37.6 day of transplant.  - Drain placed during take back. Drain placed to intra-abdominal abscess on 11/22.   - Fluid from collection noted with enterococcus VRE completed Zyvox treatment.  - Routine 1 month Liver US 12/17 which showed elevated velocity of hepatic artery and low RIs.   - Vascular Surgery consulted. Recommend repeat US in 10-14 days (12/27-12/31) to reassess. Appreciate Vascular recs.  LFTs stable.  - Liver US yesterday to reassess HAS revealed elevated but slightly improved main hepatic artery velocity and mildly improved RIs.   - Will repeat US in 2 weeks.        Other dysphagia    -pt reports difficulty swallowing pills  -SLP consult, appreciate recs  -switched PO meds that pt is unable to swallow to liquid/IV       Hypokalemia    -Continue to monitor and replace as needed     Chest pain    - Pt c/o "burning" diffuse CP 12/21 AM.  - EKG NSR, stable from prior.  - Troponin wnl.  - Likely GI related pain.        Hypophosphatemia    -  mg qid.         Acute kidney failure with lesion of tubular necrosis    - Cont Dialysis MWF.  - Nephrology following, appreciate " "recs.  - Monitor.     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       Hypomagnesemia    - Continue to monitor & replace as needed.     Severe malnutrition    - Dietician following. Severe temporal, clavicle muscle depletion noted. Severe subq fat loss  - Nutrition has been poor over the past 24 hours.   - Discussed at length with patient and caregivers that if patient's nutrition status does not improve, will need supplemental nutrition via tube feeds or TPN.  - Appetite stimulant started 12/19.   - Caregivers to bring in outside food for patient.   - Continue calorie count.  - EGD Monday 12/24 as pt c/o poor appetite + burning in chest/esophagus and occasionally vomiting after eating x several weeks.   - EGD 12/24 unremarkable. jhon tube placed afterwards.   - TF started for nutrition 12/24/18. TF at goal rate, 50 ml/hr.  - Prealbumin 7 on 12/26.     Nausea    - Intermittent, worse over past 10 days,  Changed Pepcid to Protonix 12/9/18.  Appetite seems to be slowly improving.    - Encourage multiple, small meals and cont PRN anti-emetics.    - GI consulted.  EGD 12/24 w normal esophagus, erythematous mucosa in the antrum and nodular mucosa in the duodenal bulb w biopsies.  Path pending.  - JHON placed 12/24.  Pt tolerating tube feeds.  Denies nausea w TF.    - GI symptoms now slowly improving.        Anxiety    - Restarted Lexapro as per psych recs, 12/13.   - Monitor.     Acute delirium    - Pt with intermittent confusion since transplant was improving slowly.   - Pt with some lethargy and confusion on 11/21. Head CT negative.   - AAOx3. More alert and awake on 11/27.   - AAOx4 on 11/29. Seroquel d/c'd 11/30/18.  Trazodone for insomnia ordered w PRN dose.    - Pt then with delirium again. re consulted psych.   - delirium could be d/t rising prograf.  AMS improved since holding Prograf.    - CT head 12/4 with no acute findings.   - Restarted Prograf 12/6- will need " to monitor mental status closely.  - 12/10 delirium resolved.     Prolonged Q-T interval on ECG    -  ms on EKG 12/19.  -  on EKG 12/21.  - Monitor.       Alcohol use disorder, severe, in early remission, dependence    - Monitor.       Decreased appetite    - Continue appetite stimulant.  - GI consulted as pt c/o burning sensation in chest/esophagus + vomiting after eating, passed SLP eval, recommended GI f/u.  - Cont to encourage PO intake. Ordered additional supplements.   - EGD 12/24, unremarkable. prabhakar tube placed and TF started. Increased TF to 45 ml, new goal rate 50 ml/hr.  - Prealbumin 7 on 12/26.     Acute renal failure with acute tubular necrosis superimposed on stage 3 chronic kidney disease    - 2 weeks for DEL prior to transplant.  - Renal function slow to recover post-op.   - Nephrology consulted for management.   - Cont with HD as per nephrology recs.  Apprec recs.    - Lasix 160 mg challenge 11/28 w/ minimal output.    - HD MWF. Tolerated well 12/26.    - Held HD on 12/28.   - Strict I&Os.   - Albumin 25% x 3 doses 12/28.  -Cr 1.8 from 1.9 today, UOP increasing     Intra-abdominal abscess    - Significant increase in WBC post-op.   - OR cultures from 11/18 noted with enterococcus VRE.   - Abdominal CT performed at that time with fluid collection and drain placed on 11/22 for drainage.   - Intra-abdominal abscess culture also with enterococcus VRE.   - ID consulted and pt placed on antibxs for treatment. Pt was on Cefepime, Daptomycin, and Fluconazole for treatment.    - Pt remains with RLQ drains (one JOSE A and one Percutaneous).  One JOSE A removed 11/28.  Perc drain in placed draining tan, clear drainage.  WBC slowly improving.   - Liver US on 11/26 reviewed.   - Abdominal CT performed 11/27 and reviewed. Fluid collection noted with improvement on CT.  ID following and reassured by findings.    - Dapto, Cefepime and Flagyl changed 11/30/18 to Linezolid 600 mg PO q 12 hr x 10 days per ID.      - Added back cefepime after thora.  ID re consulted.  - Completed Zyvox x 10 days per ID thru 12/9/18. Monitor.        Long-term use of immunosuppressant medication    - Cont with prograf. Draw prograf level daily and adjust dose as needed to maintain a therapeutic level.   - restarted MMF on 12/29       At risk for opportunistic infections    - Cont with bactrim and valcyte for protection against opportunistic infections.   - Cont Isavuconazonium for fungal prophylaxis.     Leukocytosis    - See intra-abdominal abscess.  - wbc improving, cont w delirium.    - Repeat cx 12/4 with NGTD.  Completed Linezolid and Cefepime per ID.    - Wbc trended up 12/11- repeat cx 12/11 w NGTD.  - WBC count improving.  No signs of infection.       Adrenal cortical steroids causing adverse effect in therapeutic use    - Monitor.       Prophylactic immunotherapy    - See long term immunosuppression.        Debility    - Pt remains significantly debilitated.   - PT/OT for strengthening.   - Currently will need SNF for placement and further rehabilitation.   - Pt remains severely debilitated and not strong enough for SNF at this time.    - SNF consult placed 12/17. However, denied for SNF again as not strong enough at this time.  - Rehab consulted.  We now have a contract w [facility] for rehab.  Rehab willing to accept patient when medically appropriate.    - Continue aggressive therapy.     Pleural effusion associated with hepatic disorder    - c/o increased pain w deep breath today to right side.  CXR showed increased opacity in the inferior hemothorax on the right side since 11/26/2018.  Pulse ox 97-99% on RA.  Cont to monitor.   - continues to have fluid to RLL.  WBC remains elevated.    - thoracentesis performed on 11/30. Fluid noted with 4k WBC, 93 SEGS. cx no growth to date.  Cefepime added for treatment.  - Chest CT showing right pleural effusion improved, left w increased pleural effusion.   - Per ID, completed treatment of empyema  w Cefepime thru 12/8.  - sats remain 97-99% on RA.  - CXR 12/20 showed left hemidiaphragmatic margin that is better delineated than on 12/19/2018, consistent with improved aeration in the left lower lobe. + no significant detrimental interval change in the appearance of the chest, with the current examination demonstrating a slightly improved inspiratory depth level.    - denies SOB.  Last CXR 12/22 w improvement.     Type 2 diabetes mellitus without complication    - Endocrine consulted for management. Appreciate recs.   - Hypoglycemia 12/10 requiring D50.    - Repeat cx 12/11 - no growth.  - Blood glucose readings well controlled on tube feeds.     Anemia of chronic disease    - Last liver US 11/27. Evolving peritransplant hematomas with anechoic fluid collection adjacent to the right hepatic lobe.  Small volume perihepatic free fluid.  - Chest/Abd/pelvis CT 12/4 - no fluid to be drainged per transplant surgeon.  No source of bleeding.    - Last transfused 12/26 w HD w appropriate response.  Monitor with daily labs.          VTE Risk Mitigation (From admission, onward)        Ordered     heparin (porcine) injection 5,000 Units  Every 8 hours      12/24/18 0742     heparin (porcine) injection 1,000 Units  As needed (PRN)      12/12/18 1731     IP VTE HIGH RISK PATIENT  Once      11/11/18 1208          The patients clinical status was discussed at multidisplinary rounds, involving transplant surgery, transplant medicine, pharmacy, nursing, nutrition, and social work    Discharge Planning:  No Patient Care Coordination Note on file.      Krystin Arredondo PA-C  Liver Transplant  [facility]

## 2018-12-30 NOTE — SUBJECTIVE & OBJECTIVE
Scheduled Meds:   aspirin  81 mg Oral Daily    epoetin sherlyn (PROCRIT) injection  50 Units/kg (Dosing Weight) Intravenous Every Mon, Wed, Fri    ergocalciferol  50,000 Units Oral Q7 Days    escitalopram oxalate  20 mg Oral Daily    fluconazole 40 mg/ml  200 mg Oral Daily    heparin (porcine)  5,000 Units Subcutaneous Q8H    k phos di & mono-sod phos mono  500 mg Oral BID    lidocaine  1 patch Transdermal Q24H    magnesium sulfate IVPB  2 g Intravenous Q2H    metoprolol  12.5 mg Per NG tube BID    mycophenolate mofetil  500 mg Oral BID    pantoprazole  40 mg Intravenous BID    potassium chloride 10%  40 mEq Oral Once    sulfamethoxazole-trimethoprim 400-80mg  1 tablet Oral Every Mon, Wed, Fri    tacrolimus  1 mg Sublingual BID    valganciclovir 50 mg/ml  100 mg Oral Once per day on Mon Wed Fri     Continuous Infusions:  PRN Meds:acetaminophen, albuterol-ipratropium, aluminum & magnesium hydroxide-simethicone, artificial tears, bacitracin, bisacodyl, dextrose 50%, dextrose 50%, glucagon (human recombinant), glucose, glucose, heparin (porcine), hydrALAZINE, midodrine, mirtazapine, ondansetron, ramelteon, tiZANidine, traMADol    Review of Systems   Constitutional: Positive for activity change and appetite change (decreased). Negative for fatigue and fever.   HENT: Negative for congestion, facial swelling, sore throat and voice change.    Eyes: Negative for pain, discharge and visual disturbance.   Respiratory: Negative for apnea, cough, choking, chest tightness, shortness of breath and wheezing.    Cardiovascular: Negative for chest pain, palpitations and leg swelling.   Gastrointestinal: Positive for nausea. Negative for abdominal distention, abdominal pain, constipation, diarrhea and vomiting.   Endocrine: Negative.    Genitourinary: Positive for decreased urine volume. Negative for difficulty urinating, dysuria, hematuria and urgency.   Musculoskeletal: Negative.  Negative for back pain, gait problem,  neck pain and neck stiffness.   Skin: Positive for wound. Negative for color change and pallor.   Allergic/Immunologic: Positive for immunocompromised state.   Neurological: Positive for weakness. Negative for dizziness, seizures, light-headedness and headaches.   Psychiatric/Behavioral: Negative for behavioral problems, confusion, decreased concentration, dysphoric mood, hallucinations, sleep disturbance and suicidal ideas. The patient is not nervous/anxious.      Objective:     Vital Signs (Most Recent):  Temp: 98.3 °F (36.8 °C) (12/30/18 0745)  Pulse: 88 (12/30/18 0745)  Resp: 20 (12/30/18 0745)  BP: 135/88 (12/30/18 0745)  SpO2: 99 % (12/30/18 0745) Vital Signs (24h Range):  Temp:  [98.3 °F (36.8 °C)-99.3 °F (37.4 °C)] 98.3 °F (36.8 °C)  Pulse:  [85-94] 88  Resp:  [18-33] 20  SpO2:  [98 %-100 %] 99 %  BP: (123-135)/(81-88) 135/88     Weight: 65.8 kg (145 lb 1 oz)  Body mass index is 20.81 kg/m².    Intake/Output - Last 3 Shifts       12/28 0700 - 12/29 0659 12/29 0700 - 12/30 0659 12/30 0700 - 12/31 0659    P.O. 460 650     I.V. (mL/kg) 300 (4.6)  50 (0.8)    NG/ 1750 420    Total Intake(mL/kg) 1720 (26.1) 2400 (36.5) 470 (7.1)    Urine (mL/kg/hr) 400 (0.3) 270 (0.2)     Stool 0 0     Total Output 400 270     Net +1320 +2130 +470           Stool Occurrence 2 x 1 x           Physical Exam   Constitutional: He is oriented to person, place, and time. He appears well-developed. No distress.   Temporal and distal extremity muscle wasting noted.   HENT:   Head: Normocephalic and atraumatic.   Mouth/Throat: Oropharynx is clear and moist. No oropharyngeal exudate.   Eyes: EOM are normal.   Neck: Normal range of motion. Neck supple. No JVD present.   Cardiovascular: Normal rate, regular rhythm, normal heart sounds and intact distal pulses.   No murmur heard.  Pulmonary/Chest: Effort normal. No respiratory distress. He has decreased breath sounds in the right lower field and the left lower field. He has no wheezes.  He exhibits no tenderness.   Abdominal: Soft. Bowel sounds are normal. He exhibits no distension. There is no tenderness.   Chevron inc clean, dry, intact with steri strips in place     Musculoskeletal: Normal range of motion. He exhibits no edema or tenderness.   Neurological: He is alert and oriented to person, place, and time. He has normal reflexes.   Skin: Skin is warm and dry. Capillary refill takes 2 to 3 seconds. He is not diaphoretic. No pallor.   Psychiatric: He has a normal mood and affect. His behavior is normal. Thought content normal. His affect is not labile. He is not slowed. Cognition and memory are not impaired.   Nursing note and vitals reviewed.      Laboratory:  Immunosuppressants         Stop Route Frequency     tacrolimus capsule 1 mg      -- SL 2 times daily     mycophenolate mofetil 200 mg/mL suspension 500 mg      -- Oral 2 times daily        CBC:   Recent Labs   Lab 12/30/18  0620   WBC 10.89   RBC 2.88*   HGB 8.0*   HCT 26.4*      MCV 92   MCH 27.8   MCHC 30.3*     CMP:   Recent Labs   Lab 12/30/18  0620   *   CALCIUM 8.9   ALBUMIN 2.6*   PROT 6.0      K 3.2*   CO2 27      BUN 51*   CREATININE 1.8*   ALKPHOS 160*   ALT 10   AST 15   BILITOT 1.0     Labs within the past 24 hours have been reviewed.    Diagnostic Results:  None

## 2018-12-31 PROBLEM — E83.39 HYPOPHOSPHATEMIA: Status: RESOLVED | Noted: 2018-12-21 | Resolved: 2018-12-31

## 2018-12-31 PROBLEM — R07.9 CHEST PAIN: Status: RESOLVED | Noted: 2018-12-21 | Resolved: 2018-12-31

## 2018-12-31 PROBLEM — R17 JAUNDICE: Status: RESOLVED | Noted: 2018-12-24 | Resolved: 2018-12-31

## 2018-12-31 PROBLEM — D72.829 LEUKOCYTOSIS: Status: RESOLVED | Noted: 2018-11-17 | Resolved: 2018-12-31

## 2018-12-31 PROBLEM — R41.0 DELIRIUM: Status: RESOLVED | Noted: 2018-12-07 | Resolved: 2018-12-31

## 2018-12-31 LAB
ALBUMIN SERPL BCP-MCNC: 2.4 G/DL
ALP SERPL-CCNC: 166 U/L
ALT SERPL W/O P-5'-P-CCNC: 10 U/L
ANION GAP SERPL CALC-SCNC: 11 MMOL/L
AST SERPL-CCNC: 12 U/L
BASOPHILS # BLD AUTO: 0.23 K/UL
BASOPHILS NFR BLD: 2.1 %
BILIRUB SERPL-MCNC: 0.9 MG/DL
BUN SERPL-MCNC: 54 MG/DL
CALCIUM SERPL-MCNC: 9.2 MG/DL
CHLORIDE SERPL-SCNC: 103 MMOL/L
CO2 SERPL-SCNC: 26 MMOL/L
CREAT SERPL-MCNC: 1.5 MG/DL
DIFFERENTIAL METHOD: ABNORMAL
EOSINOPHIL # BLD AUTO: 0.9 K/UL
EOSINOPHIL NFR BLD: 8.1 %
ERYTHROCYTE [DISTWIDTH] IN BLOOD BY AUTOMATED COUNT: 15.4 %
EST. GFR  (AFRICAN AMERICAN): >60 ML/MIN/1.73 M^2
EST. GFR  (NON AFRICAN AMERICAN): 52.4 ML/MIN/1.73 M^2
GLUCOSE SERPL-MCNC: 157 MG/DL
HCT VFR BLD AUTO: 25.4 %
HGB BLD-MCNC: 7.8 G/DL
IMM GRANULOCYTES # BLD AUTO: 0.16 K/UL
IMM GRANULOCYTES NFR BLD AUTO: 1.5 %
LYMPHOCYTES # BLD AUTO: 0.9 K/UL
LYMPHOCYTES NFR BLD: 8.2 %
MAGNESIUM SERPL-MCNC: 2 MG/DL
MCH RBC QN AUTO: 27.8 PG
MCHC RBC AUTO-ENTMCNC: 30.7 G/DL
MCV RBC AUTO: 90 FL
MONOCYTES # BLD AUTO: 1 K/UL
MONOCYTES NFR BLD: 9.5 %
NEUTROPHILS # BLD AUTO: 7.7 K/UL
NEUTROPHILS NFR BLD: 70.6 %
NRBC BLD-RTO: 0 /100 WBC
PHOSPHATE SERPL-MCNC: 2.9 MG/DL
PLATELET # BLD AUTO: 321 K/UL
PMV BLD AUTO: 12.1 FL
POCT GLUCOSE: 161 MG/DL (ref 70–110)
POCT GLUCOSE: 182 MG/DL (ref 70–110)
POCT GLUCOSE: 212 MG/DL (ref 70–110)
POTASSIUM SERPL-SCNC: 3.6 MMOL/L
PROT SERPL-MCNC: 6 G/DL
RBC # BLD AUTO: 2.81 M/UL
SODIUM SERPL-SCNC: 140 MMOL/L
TACROLIMUS BLD-MCNC: 5.3 NG/ML
WBC # BLD AUTO: 10.94 K/UL

## 2018-12-31 PROCEDURE — 25000003 PHARM REV CODE 250: Performed by: NURSE PRACTITIONER

## 2018-12-31 PROCEDURE — 80053 COMPREHEN METABOLIC PANEL: CPT

## 2018-12-31 PROCEDURE — 83735 ASSAY OF MAGNESIUM: CPT

## 2018-12-31 PROCEDURE — 84100 ASSAY OF PHOSPHORUS: CPT

## 2018-12-31 PROCEDURE — 99232 SBSQ HOSP IP/OBS MODERATE 35: CPT | Mod: ,,, | Performed by: NURSE PRACTITIONER

## 2018-12-31 PROCEDURE — 36415 COLL VENOUS BLD VENIPUNCTURE: CPT

## 2018-12-31 PROCEDURE — 99232 PR SUBSEQUENT HOSPITAL CARE,LEVL II: ICD-10-PCS | Mod: ,,, | Performed by: NURSE PRACTITIONER

## 2018-12-31 PROCEDURE — 63600175 PHARM REV CODE 636 W HCPCS: Performed by: PHYSICIAN ASSISTANT

## 2018-12-31 PROCEDURE — 20600001 HC STEP DOWN PRIVATE ROOM

## 2018-12-31 PROCEDURE — 99233 PR SUBSEQUENT HOSPITAL CARE,LEVL III: ICD-10-PCS | Mod: 24,,, | Performed by: PHYSICIAN ASSISTANT

## 2018-12-31 PROCEDURE — 85025 COMPLETE CBC W/AUTO DIFF WBC: CPT

## 2018-12-31 PROCEDURE — 25000003 PHARM REV CODE 250: Performed by: PHYSICIAN ASSISTANT

## 2018-12-31 PROCEDURE — 63600175 PHARM REV CODE 636 W HCPCS: Performed by: NURSE PRACTITIONER

## 2018-12-31 PROCEDURE — 80197 ASSAY OF TACROLIMUS: CPT

## 2018-12-31 PROCEDURE — C9113 INJ PANTOPRAZOLE SODIUM, VIA: HCPCS | Performed by: NURSE PRACTITIONER

## 2018-12-31 PROCEDURE — G8997 SWALLOW GOAL STATUS: HCPCS | Mod: CH

## 2018-12-31 PROCEDURE — 97116 GAIT TRAINING THERAPY: CPT

## 2018-12-31 PROCEDURE — 99233 SBSQ HOSP IP/OBS HIGH 50: CPT | Mod: 24,,, | Performed by: PHYSICIAN ASSISTANT

## 2018-12-31 PROCEDURE — 94664 DEMO&/EVAL PT USE INHALER: CPT

## 2018-12-31 PROCEDURE — 99900035 HC TECH TIME PER 15 MIN (STAT)

## 2018-12-31 PROCEDURE — 97535 SELF CARE MNGMENT TRAINING: CPT

## 2018-12-31 PROCEDURE — 25000003 PHARM REV CODE 250: Performed by: STUDENT IN AN ORGANIZED HEALTH CARE EDUCATION/TRAINING PROGRAM

## 2018-12-31 PROCEDURE — 92526 ORAL FUNCTION THERAPY: CPT

## 2018-12-31 PROCEDURE — 97530 THERAPEUTIC ACTIVITIES: CPT

## 2018-12-31 RX ORDER — SULFAMETHOXAZOLE AND TRIMETHOPRIM 400; 80 MG/1; MG/1
1 TABLET ORAL DAILY
Status: DISCONTINUED | OUTPATIENT
Start: 2019-01-01 | End: 2019-01-08 | Stop reason: HOSPADM

## 2018-12-31 RX ORDER — VALGANCICLOVIR HYDROCHLORIDE 50 MG/ML
450 POWDER, FOR SOLUTION ORAL DAILY
Status: DISCONTINUED | OUTPATIENT
Start: 2019-01-01 | End: 2019-01-08

## 2018-12-31 RX ORDER — MIRTAZAPINE 15 MG/1
15 TABLET, ORALLY DISINTEGRATING ORAL NIGHTLY
Status: DISCONTINUED | OUTPATIENT
Start: 2018-12-31 | End: 2019-01-03

## 2018-12-31 RX ORDER — MIRTAZAPINE 7.5 MG/1
7.5 TABLET, FILM COATED ORAL NIGHTLY
Status: DISCONTINUED | OUTPATIENT
Start: 2018-12-31 | End: 2018-12-31

## 2018-12-31 RX ADMIN — MIRTAZAPINE 15 MG: 15 TABLET, ORALLY DISINTEGRATING ORAL at 08:12

## 2018-12-31 RX ADMIN — Medication 12.5 MG: at 08:12

## 2018-12-31 RX ADMIN — TIZANIDINE 2 MG: 2 TABLET ORAL at 08:12

## 2018-12-31 RX ADMIN — HEPARIN SODIUM 5000 UNITS: 5000 INJECTION, SOLUTION INTRAVENOUS; SUBCUTANEOUS at 04:12

## 2018-12-31 RX ADMIN — Medication 500 MG: at 08:12

## 2018-12-31 RX ADMIN — PANTOPRAZOLE SODIUM 40 MG: 40 INJECTION, POWDER, LYOPHILIZED, FOR SOLUTION INTRAVENOUS at 07:12

## 2018-12-31 RX ADMIN — HEPARIN SODIUM 5000 UNITS: 5000 INJECTION, SOLUTION INTRAVENOUS; SUBCUTANEOUS at 02:12

## 2018-12-31 RX ADMIN — PANTOPRAZOLE SODIUM 40 MG: 40 INJECTION, POWDER, LYOPHILIZED, FOR SOLUTION INTRAVENOUS at 08:12

## 2018-12-31 RX ADMIN — DIBASIC SODIUM PHOSPHATE, MONOBASIC POTASSIUM PHOSPHATE AND MONOBASIC SODIUM PHOSPHATE 2 TABLET: 852; 155; 130 TABLET ORAL at 07:12

## 2018-12-31 RX ADMIN — Medication 12.5 MG: at 07:12

## 2018-12-31 RX ADMIN — FLUCONAZOLE 200 MG: 40 POWDER, FOR SUSPENSION ORAL at 07:12

## 2018-12-31 RX ADMIN — TIZANIDINE 2 MG: 2 TABLET ORAL at 11:12

## 2018-12-31 RX ADMIN — TACROLIMUS 1 MG: 1 CAPSULE ORAL at 07:12

## 2018-12-31 RX ADMIN — LIDOCAINE 1 PATCH: 50 PATCH CUTANEOUS at 04:12

## 2018-12-31 RX ADMIN — ESCITALOPRAM OXALATE 20 MG: 5 SOLUTION ORAL at 07:12

## 2018-12-31 RX ADMIN — ASPIRIN 81 MG CHEWABLE TABLET 81 MG: 81 TABLET CHEWABLE at 07:12

## 2018-12-31 RX ADMIN — Medication 500 MG: at 07:12

## 2018-12-31 RX ADMIN — TRAMADOL HYDROCHLORIDE 50 MG: 50 TABLET, FILM COATED ORAL at 09:12

## 2018-12-31 RX ADMIN — HEPARIN SODIUM 5000 UNITS: 5000 INJECTION, SOLUTION INTRAVENOUS; SUBCUTANEOUS at 08:12

## 2018-12-31 RX ADMIN — TACROLIMUS 1 MG: 1 CAPSULE ORAL at 05:12

## 2018-12-31 NOTE — PLAN OF CARE
Problem: Patient Care Overview  Goal: Plan of Care Review  Outcome: Ongoing (interventions implemented as appropriate)  AAOx4, VSS, AC/HS- no coverage needed.   Novasource feeding continued to L nostril @ goal rate of 50cc/hr. Minimal residual noted + No nausea overnight. Bag + tubing changed @ 2000 12/30, speech following. Able to swallow thin liquids with PO medication + aspiration precaution remain in place.   Minimal UOP overnight, Cr stable 1.8. Holding HD at this time per nephrology. Assessing daily.  No acute changes overnight, sleeping well. Bed in lowest position, HOB >30 degrees. Will continue to monitor.

## 2018-12-31 NOTE — SUBJECTIVE & OBJECTIVE
Interval History 12/31/2018:  Patient is seen for follow-up rehab evaluation and recommendations: [place] tube still in place.     HPI, Past Medical, Family, and Social History remains the same as documented in the initial encounter.    Scheduled Medications:    aspirin  81 mg Oral Daily    epoetin sherlyn (PROCRIT) injection  50 Units/kg (Dosing Weight) Intravenous Every Mon, Wed, Fri    ergocalciferol  50,000 Units Oral Q7 Days    escitalopram oxalate  20 mg Oral Daily    fluconazole 40 mg/ml  200 mg Oral Daily    heparin (porcine)  5,000 Units Subcutaneous Q8H    lidocaine  1 patch Transdermal Q24H    metoprolol  12.5 mg Per NG tube BID    mirtazapine  15 mg Oral Nightly    mirtazapine  7.5 mg Oral Nightly    mycophenolate mofetil  500 mg Oral BID    pantoprazole  40 mg Intravenous BID    [START ON 1/1/2019] sulfamethoxazole-trimethoprim 400-80mg  1 tablet Oral Daily    tacrolimus  1 mg Sublingual BID    [START ON 1/1/2019] valganciclovir 50 mg/ml  450 mg Oral Daily       Diagnostic Results: Labs: Reviewed    PRN Medications: acetaminophen, albuterol-ipratropium, aluminum & magnesium hydroxide-simethicone, artificial tears, bacitracin, bisacodyl, dextrose 50%, dextrose 50%, glucagon (human recombinant), glucose, glucose, heparin (porcine), ondansetron, tiZANidine, traMADol    Review of Systems   Constitutional: Positive for activity change and fatigue. Negative for fever.   HENT: Positive for trouble swallowing. Negative for voice change.    Respiratory: Negative for cough and shortness of breath.    Cardiovascular: Negative for chest pain and palpitations.   Gastrointestinal: Negative for nausea and vomiting.   Musculoskeletal: Positive for gait problem. Negative for arthralgias.   Skin: Positive for wound (surgical). Negative for color change.   Neurological: Positive for dizziness and weakness.     Objective:     Vital Signs (Most Recent):  Temp: 98.5 °F (36.9 °C) (12/31/18 0745)  Pulse: 89 (12/31/18  0745)  Resp: 20 (12/31/18 0745)  BP: 132/89 (12/31/18 0745)  SpO2: 99 % (12/31/18 0745)    Vital Signs (24h Range):  Temp:  [98.3 °F (36.8 °C)-98.6 °F (37 °C)] 98.5 °F (36.9 °C)  Pulse:  [82-89] 89  Resp:  [19-31] 20  SpO2:  [98 %-99 %] 99 %  BP: (130-147)/(84-92) 132/89     Physical Exam   Constitutional: He is oriented to person, place, and time. He appears well-developed.   Appears with more energy    HENT:   Head: Normocephalic and atraumatic.   Eyes: Right eye exhibits no discharge. Left eye exhibits no discharge. No scleral icterus.   Neck: Neck supple.   Cardiovascular: Normal rate and intact distal pulses.   Pulmonary/Chest: Effort normal. No respiratory distress.   Abdominal: Soft. He exhibits no distension.   [place] tube in place   Musculoskeletal: He exhibits no edema or deformity.   RUE: 5/5.  LUE: 5/5.  RLE: 3/5.  LLE: 3/5.   Neurological: He is alert and oriented to person, place, and time. No sensory deficit.        Skin: Skin is warm and dry.   Psychiatric: He has a normal mood and affect. His behavior is normal.     NEUROLOGICAL EXAMINATION:     MENTAL STATUS   Oriented to person, place, and time.

## 2018-12-31 NOTE — ASSESSMENT & PLAN NOTE
- Dietician following. Severe temporal, clavicle muscle depletion noted. Severe subq fat loss  - Nutrition has been poor over the past 24 hours.   - Discussed at length with patient and caregivers that if patient's nutrition status does not improve, will need supplemental nutrition via tube feeds or TPN.  - Appetite stimulant started 12/19.   - Caregivers to bring in outside food for patient.   - Continue calorie count.  - EGD Monday 12/24 as pt c/o poor appetite + burning in chest/esophagus and occasionally vomiting after eating x several weeks.   - EGD 12/24 unremarkable. prabhakar tube placed afterwards.   - TF started for nutrition 12/24/18. TF at goal rate, 50 ml/hr.  - Prealbumin 7 on 12/26.  -Start scheduled remeron 12/31.

## 2018-12-31 NOTE — PT/OT/SLP PROGRESS
"Speech Language Pathology Treatment  Discharge    Patient Name:  Femi Enciso   MRN:  [id number]   [id number]    Admitting Diagnosis: S/P liver transplant    Recommendations:                 General Recommendations:  Follow-up not indicated  Diet recommendations:  Regular, Liquid Diet Level: Thin   Aspiration Precautions: Standard aspiration precautions, whole meds 1 at a time- buried in puree as needed  General Precautions: Standard, fall  Communication strategies:  none    Subjective     Patient alert and cooperative during session. RN present in room administering medications. NGT in place.     Pain/Comfort:    no pain reported    Objective:     Has the patient been evaluated by SLP for swallowing?   Yes  Keep patient NPO? No   Current Respiratory Status: room air      Patient seen for swallow assessment and monitoring of diet tolerance. HOB elevated to 90 degrees. Patient reports no difficulties with swallowing since last Speech Therapy evaluation. Patient reports difficulties with large pills only (phosphate pills), and he buries them in pudding. He reports pills getting stuck, and points to sternal notch. He reports food and liquids do not get stuck. Patient assessed with self regulated consecutive sips of water, bites of Cheerios, and RN administered pills x2 (small pill with water, large pill buried in pudding). No overt s/s of aspiration with all trials. Patient reports no difficulties with initial small pill. S/p phosphate pill, SLP questioned if patient felt pill sticking. He stated, "It's too early to tell." He alter reported feeling pill in throat. SLP cued patien tto tuck chin to chest and take a bite of pudding. Patient reported compensatory strategy cleared globus sensation. He refused further trials with SLP and refused further medications at this time reporting his stomach getting upset. SLP reviewed SLP role, s/s and risks of aspiraiton, and safe swallow precautions. He v/u and is in agreement with " POC. Patient independent with swallowing compensatory strategies.       Assessment:     Femi Enciso is a 53 y.o. male with a safe oropharyngeal swallow at this time. No further skilled acute ST services warranted at this time. Please reconsult as needed.     Goals:   Multidisciplinary Problems     SLP Goals        Problem: SLP Goal    Goal Priority Disciplines Outcome   SLP Goal   (Resolved)     SLP Resolved for Upgrade   Description:  Speech Language Pathology Goals  Goals expected to be met by 11/30/2018  1. Pt will participate in ongoing swallow assessment MET  2. Pt will tolerate regular diet with thin liquids, MOD I MET  3. Educate Pt and family on S/S aspiration                      Multidisciplinary Problems (Resolved)        Problem: SLP Goal    Goal Priority Disciplines Outcome   SLP Goal   (Resolved)     SLP Outcome(s) achieved   Description:  Speech-Language Pathology Goals  Goals to be met by 12/3/18  1. Patient will tolerate REGULAR DIET/THIN LIQUIDS with no s/s of aspiration.  2. Educate patient and family regarding risks/warning signs of aspiration.           Problem: SLP Goal    Goal Priority Disciplines Outcome   SLP Goal   (Resolved)     SLP Outcome(s) achieved   Description:  Speech Language Pathology Goals  Goals expected to be met by 1/6  1. Pt will tolerate thin liquids without overt s/s aspiration.   2. Pt will verbalize swallow precs indptly.                         Plan:     · Plan of Care reviewed with:  patient   · SLP Follow-Up:  No       Discharge recommendations:  (no ST needs)   Barriers to Discharge:  None    Time Tracking:     SLP Treatment Date:   12/31/18  Speech Start Time:  0751  Speech Stop Time:  0804     Speech Total Time (min):  13 min    Billable Minutes: Treatment Swallowing Dysfunction 13    JESSICA Santos, CCC-SLP  Pager: [phone]  12/31/2018

## 2018-12-31 NOTE — ASSESSMENT & PLAN NOTE
-2/2 deconditioning from liver trx     See hospital course for functional, cognitive/speech/language, and nutrition/swallow status.      Recommendations  -  Encourage mobility, OOB in chair at least 3 hours per day, and early ambulation as appropriate  -  PT/OT evaluate and treat  -  Pain management  -  Monitor for and prevent skin breakdown and pressure ulcers  · Early mobility, repositioning/weight shifting every 20-30 minutes when sitting, turn patient every 2 hours, proper mattress/overlay and chair cushioning, pressure relief/heel protector boots  -  DVT prophylaxis:  [facility]  -  Reviewed discharge options (IP rehab, SNF, HH therapy, and OP therapy)

## 2018-12-31 NOTE — PT/OT/SLP PROGRESS
Physical Therapy Treatment    Patient Name:  Femi Enciso   MRN:  [id number]    Recommendations:     Discharge Recommendations:  nursing facility, skilled   Discharge Equipment Recommendations: (TBD)   Barriers to discharge: Decreased caregiver support at current functional level    Assessment:     Femi Enciso is a 53 y.o. male admitted with a medical diagnosis of S/P liver transplant.  He presents with the following impairments/functional limitations:  weakness, gait instability, impaired endurance, impaired balance, impaired self care skills, impaired functional mobilty, impaired cardiopulmonary response to activity, decreased safety awareness. Pt participated in gait training with RW this date. He continues to demo impaired endurance and generalized weakness, limiting further progression. Pt demo'ing some self-limiting behaviors and required max encouragement for progression of mobility. Pt would continue to benefit from skilled acute PT in order to address current deficits and progress functional mobility.     Rehab Prognosis: Good; patient would benefit from acute skilled PT services to address these deficits and reach maximum level of function.    Recent Surgery: Procedure(s) (LRB):  EGD (ESOPHAGOGASTRODUODENOSCOPY) (N/A) 7 Days Post-Op     Plan:     During this hospitalization, patient to be seen 4 x/week to address the identified rehab impairments via gait training, therapeutic activities, therapeutic exercises, neuromuscular re-education and progress toward the following goals:    · Plan of Care Expires:  01/17/19    Subjective     Chief Complaint: nausea  Pain/Comfort:  · Pain Rating 1: 0/10      Objective:     Communicated with RN prior to session.  Patient found supine with telemetry, NG tube, pulse ox (continuous)  upon PT entry to room.     General Precautions: Standard, fall   Orthopedic Precautions:N/A   Braces: N/A     Functional Mobility:  · Bed Mobility:     · Supine to Sit: minimum assistance and with  "side rail and HOB elevated (managing trunk")  · With cues for sequencing and technique   · Transfers:     · Sit to Stand:  minAx2 x1 rep from EOB and x1 rep from bedside chair; modAx2 x1 rep from bedside chair    · With cues for hand and foot placement and use of forward momentum for improved ease of transfer   · Bed to Chair: minimum assistance and of 2 persons with  no AD  using  Stand Pivot  § B knees blocked throughout to prevent buckling/maintain knee ext  § Cues throughout for sequencing of steps and forward progression  § Decreased toe-floor clearance BLE, decreased step length  · Gait: 8 ft. + 12 ft. with RW and Julito  · Pt taken to hallway via chair for improved ease of gait training. Chair follow throughout with seated rest between gait trials. Returned to room via chair following.   · Pt educated on use of RW for assist with off-loading BLE.  · Decreased step length, decreased daniela, impaired weight-shifting ability, decreased heel strike   · Tactile cues provided throughout for quad activation and increased knee ext  · Verbalized cues for RW management, upright posture, safe mobility techniques, and continued forward progression       AM-PAC 6 CLICK MOBILITY   Turning over in bed (including adjusting bedclothes, sheets and blankets)?: 3  Sitting down on and standing up from a chair with arms (e.g., wheelchair, bedside commode, etc.): 2  Moving from lying on back to sitting on the side of the bed?: 3  Moving to and from a bed to a chair (including a wheelchair)?: 2  Need to walk in hospital room?: 2  Climbing 3-5 steps with a railing?: 1  Basic Mobility Total Score: 13       Therapeutic Activities and Exercises:  Donned socks while seated EOB with Julito for seated balance throughout.    Pt educated on the importance of participation in therapy and progression of mobility. Pt instructed on getting to chair daily with nursing assist. Pt verbalized understanding but continues to need reinforcement. "     Patient left up in chair with all lines intact, call button in reach, RN notified and pt's mother present..    GOALS:   Multidisciplinary Problems     Physical Therapy Goals        Problem: Physical Therapy Goal    Goal Priority Disciplines Outcome Goal Variances Interventions   Physical Therapy Goal     PT, PT/OT Ongoing (interventions implemented as appropriate)     Description:  Goals to be met by: 2018     Patient will increase functional independence with mobility by performin. Supine to sit with Modified [place] -not met  2. Sit to stand transfer with [place] -not met  3. Bed to chair transfer with independence using no AD -not met  4. Gait  x150 feet with Supervision using LRAD. -not met  5. Lower extremity exercise program x20 reps per handout, with independence -not met                      Multidisciplinary Problems (Resolved)        Problem: Physical Therapy Goal    Goal Priority Disciplines Outcome Goal Variances Interventions   Physical Therapy Goal   (Resolved)     PT, PT/OT Resolved for Upgrade                     Time Tracking:     PT Received On: 18  PT Start Time: 0843     PT Stop Time: 0912  PT Total Time (min): 29 min     Billable Minutes: Gait Training 23   (co-tx with OT)    Treatment Type: Treatment  PT/PTA: PT     PTA Visit Number: 0     Ramandeep Kumar, PT, DPT   2018  [phone]

## 2018-12-31 NOTE — PROGRESS NOTES
[facility]  Physical Medicine & Rehab  Progress Note    Patient Name: Femi Enciso  MRN: [id number]  Admission Date: 10/27/2018  Length of Stay: 65 days  Attending Physician: Femi Patel MD    Subjective:     Principal Problem:S/P liver transplant    Hospital Course:   11/21/2018: Evaluated by therapy.  Bed mobility MaxA.  Sit to stand ModA x 2 ppl.  Ambulated 1 step MaxA.  UBD/LBD/toileting MaxA. Grooming Julito.  12/19/2018: Participated with therapy.  Bed mobility Julito.  Sit to stand Min-ModA and transfers ModA x 2 ppl.  No gait 2/2 nausea and dizziness during steps for trx.  UBD Julito and feeding (I).   12/20/2018: Participated with OT.  Sit to stand ModA x 2 ppl. No gait 2/2 anxiety.  Grooming Mod (I).   12/21/2018: Participated with PT.  Bed mobility Julito.  Sit to stand and transfers modA.  Ambulated ~3 steps modA.   12/24/2018: Participated with therapy.  Bed mobility CGA.  Sit to stand Min-ModA.  Ambulated-limited steps Min-MaxA x 2.  UBD Julito.  12/26/18: No therapy 2/2 dialysis.  12/27/2018: Participated with therapy.  Bed mobility CGA.  Sit to stand and transfers Julito.  Ambulated 3 ft Julito.  Pt refused all oob activities and ADL training. Pt requested OT to teach pt how to perform BUE exercises w/ theraband    Interval History 12/31/2018:  Patient is seen for follow-up rehab evaluation and recommendations: [place] tube still in place.     HPI, Past Medical, Family, and Social History remains the same as documented in the initial encounter.    Scheduled Medications:    aspirin  81 mg Oral Daily    epoetin sherlyn (PROCRIT) injection  50 Units/kg (Dosing Weight) Intravenous Every Mon, Wed, Fri    ergocalciferol  50,000 Units Oral Q7 Days    escitalopram oxalate  20 mg Oral Daily    fluconazole 40 mg/ml  200 mg Oral Daily    heparin (porcine)  5,000 Units Subcutaneous Q8H    lidocaine  1 patch Transdermal Q24H    metoprolol  12.5 mg Per NG tube BID    mirtazapine  15 mg Oral Nightly     mirtazapine  7.5 mg Oral Nightly    mycophenolate mofetil  500 mg Oral BID    pantoprazole  40 mg Intravenous BID    [START ON 1/1/2019] sulfamethoxazole-trimethoprim 400-80mg  1 tablet Oral Daily    tacrolimus  1 mg Sublingual BID    [START ON 1/1/2019] valganciclovir 50 mg/ml  450 mg Oral Daily       Diagnostic Results: Labs: Reviewed    PRN Medications: acetaminophen, albuterol-ipratropium, aluminum & magnesium hydroxide-simethicone, artificial tears, bacitracin, bisacodyl, dextrose 50%, dextrose 50%, glucagon (human recombinant), glucose, glucose, heparin (porcine), ondansetron, tiZANidine, traMADol    Review of Systems   Constitutional: Positive for activity change and fatigue. Negative for fever.   HENT: Positive for trouble swallowing. Negative for voice change.    Respiratory: Negative for cough and shortness of breath.    Cardiovascular: Negative for chest pain and palpitations.   Gastrointestinal: Negative for nausea and vomiting.   Musculoskeletal: Positive for gait problem. Negative for arthralgias.   Skin: Positive for wound (surgical). Negative for color change.   Neurological: Positive for dizziness and weakness.     Objective:     Vital Signs (Most Recent):  Temp: 98.5 °F (36.9 °C) (12/31/18 0745)  Pulse: 89 (12/31/18 0745)  Resp: 20 (12/31/18 0745)  BP: 132/89 (12/31/18 0745)  SpO2: 99 % (12/31/18 0745)    Vital Signs (24h Range):  Temp:  [98.3 °F (36.8 °C)-98.6 °F (37 °C)] 98.5 °F (36.9 °C)  Pulse:  [82-89] 89  Resp:  [19-31] 20  SpO2:  [98 %-99 %] 99 %  BP: (130-147)/(84-92) 132/89     Physical Exam   Constitutional: He is oriented to person, place, and time. He appears well-developed.   Appears with more energy    HENT:   Head: Normocephalic and atraumatic.   Eyes: Right eye exhibits no discharge. Left eye exhibits no discharge. No scleral icterus.   Neck: Neck supple.   Cardiovascular: Normal rate and intact distal pulses.   Pulmonary/Chest: Effort normal. No respiratory distress.    Abdominal: Soft. He exhibits distension.   [place] tube in place   Musculoskeletal: He exhibits no edema or deformity.   RUE: 5/5.  LUE: 5/5.  RLE: 3/5.  LLE: 3/5.   Neurological: He is alert and oriented to person, place, and time. No sensory deficit.   Skin: Skin is warm and dry.   Psychiatric: He has a normal mood and affect. His behavior is normal.        Assessment/Plan:      * S/P liver transplant    -s/p liver trx on 11/11 for alcoholic cirrhosis       Impaired functional mobility and endurance    -2/2 deconditioning from liver trx     See hospital course for functional, cognitive/speech/language, and nutrition/swallow status.      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     Severe malnutrition    -see decreased appetite     Decreased appetite    -  Decreased PO intake  -  s/p EGD 12/24->antrum and duodenal bulb bx  -  prabhakar tube placed afterwards-->TF started for nutrition  -  Appears more energetic with TFs  - final nutrition plan pending        Acute renal failure with acute tubular necrosis superimposed on stage 3 chronic kidney  disease    -Nephrology following   -RRT held at this time  -kidney function improving      Intra-abdominal abscess    -s/p drain, now removed   -IV abx completed         Pleural effusion associated with hepatic disorder    -last CXR on 12/19 with small left pleural effusion may be present  -on RA  -completed IV abx     Up to date therapy notes pending. [place] tube still in place. Will follow progress and discuss with rehab team for post acute care/rehab recommendation.      Mela Scanlon NP  Department of Physical Medicine & Rehab   [facility]

## 2018-12-31 NOTE — SUBJECTIVE & OBJECTIVE
Scheduled Meds:   aspirin  81 mg Oral Daily    epoetin sherlyn (PROCRIT) injection  50 Units/kg (Dosing Weight) Intravenous Every Mon, Wed, Fri    ergocalciferol  50,000 Units Oral Q7 Days    escitalopram oxalate  20 mg Oral Daily    fluconazole 40 mg/ml  200 mg Oral Daily    heparin (porcine)  5,000 Units Subcutaneous Q8H    lidocaine  1 patch Transdermal Q24H    metoprolol  12.5 mg Per NG tube BID    mirtazapine  15 mg Oral Nightly    mirtazapine  7.5 mg Oral Nightly    mycophenolate mofetil  500 mg Oral BID    pantoprazole  40 mg Intravenous BID    [START ON 1/1/2019] sulfamethoxazole-trimethoprim 400-80mg  1 tablet Oral Daily    tacrolimus  1 mg Sublingual BID    [START ON 1/1/2019] valganciclovir 50 mg/ml  450 mg Oral Daily     Continuous Infusions:  PRN Meds:acetaminophen, albuterol-ipratropium, aluminum & magnesium hydroxide-simethicone, artificial tears, bacitracin, bisacodyl, dextrose 50%, dextrose 50%, glucagon (human recombinant), glucose, glucose, heparin (porcine), ondansetron, tiZANidine, traMADol    Review of Systems   Constitutional: Positive for activity change and appetite change (decreased). Negative for fatigue and fever.   HENT: Negative for congestion, facial swelling, sore throat and voice change.    Eyes: Negative for pain, discharge and visual disturbance.   Respiratory: Negative for apnea, cough, choking, chest tightness, shortness of breath and wheezing.    Cardiovascular: Negative for chest pain, palpitations and leg swelling.   Gastrointestinal: Positive for nausea. Negative for abdominal distention, abdominal pain, constipation, diarrhea and vomiting.   Endocrine: Negative.    Genitourinary: Negative for difficulty urinating, dysuria, hematuria and urgency.   Musculoskeletal: Negative.  Negative for back pain, gait problem, neck pain and neck stiffness.   Skin: Positive for wound. Negative for color change and pallor.   Allergic/Immunologic: Positive for immunocompromised  state.   Neurological: Positive for weakness. Negative for dizziness, seizures, light-headedness and headaches.   Psychiatric/Behavioral: Negative for behavioral problems, confusion, decreased concentration, dysphoric mood, hallucinations, sleep disturbance and suicidal ideas. The patient is not nervous/anxious.      Objective:     Vital Signs (Most Recent):  Temp: 98.5 °F (36.9 °C) (12/31/18 0745)  Pulse: 84 (12/31/18 1200)  Resp: 20 (12/31/18 1200)  BP: 128/87 (12/31/18 1200)  SpO2: 100 % (12/31/18 1200) Vital Signs (24h Range):  Temp:  [98.3 °F (36.8 °C)-98.6 °F (37 °C)] 98.5 °F (36.9 °C)  Pulse:  [82-89] 84  Resp:  [19-31] 20  SpO2:  [98 %-100 %] 100 %  BP: (128-147)/(84-92) 128/87     Weight: 70.3 kg (154 lb 15.7 oz)  Body mass index is 22.24 kg/m².    Intake/Output - Last 3 Shifts       12/29 0700 - 12/30 0659 12/30 0700 - 12/31 0659 12/31 0700 - 01/01 0659    P.O. 650 380 120    I.V. (mL/kg)  100 (1.4)     NG/GT 1750 720 800    Total Intake(mL/kg) 2400 (36.5) 1200 (17.1) 920 (13.1)    Urine (mL/kg/hr) 270 (0.2) 675 (0.4)     Emesis/NG output  0     Other  0     Stool 0 0     Blood  0     Total Output 270 675     Net +2130 +525 +920           Urine Occurrence  0 x     Stool Occurrence 1 x 1 x     Emesis Occurrence  0 x           Physical Exam   Constitutional: He is oriented to person, place, and time. He appears well-developed. No distress.   Temporal and distal extremity muscle wasting noted.   HENT:   Head: Normocephalic and atraumatic.   Mouth/Throat: Oropharynx is clear and moist. No oropharyngeal exudate.   Eyes: EOM are normal.   Neck: Normal range of motion. Neck supple. No JVD present.   Cardiovascular: Normal rate, regular rhythm, normal heart sounds and intact distal pulses.   No murmur heard.  Pulmonary/Chest: Effort normal. No respiratory distress. He has decreased breath sounds in the right lower field and the left lower field. He has no wheezes. He exhibits no tenderness.   Abdominal: Soft.  Bowel sounds are normal. He exhibits no distension. There is no tenderness.   Chevron inc clean, dry, intact with steri strips in place     Musculoskeletal: Normal range of motion. He exhibits no edema or tenderness.   Neurological: He is alert and oriented to person, place, and time. He has normal reflexes.   Skin: Skin is warm and dry. Capillary refill takes 2 to 3 seconds. He is not diaphoretic. No pallor.   Psychiatric: He has a normal mood and affect. His behavior is normal. Thought content normal. His affect is not labile. He is not slowed. Cognition and memory are not impaired.   Nursing note and vitals reviewed.      Laboratory:  Immunosuppressants         Stop Route Frequency     tacrolimus capsule 1 mg      -- SL 2 times daily     mycophenolate mofetil 200 mg/mL suspension 500 mg      -- Oral 2 times daily        CBC:   Recent Labs   Lab 12/31/18  0656   WBC 10.94   RBC 2.81*   HGB 7.8*   HCT 25.4*      MCV 90   MCH 27.8   MCHC 30.7*     CMP:   Recent Labs   Lab 12/31/18  0656   *   CALCIUM 9.2   ALBUMIN 2.4*   PROT 6.0      K 3.6   CO2 26      BUN 54*   CREATININE 1.5*   ALKPHOS 166*   ALT 10   AST 12   BILITOT 0.9     Labs within the past 24 hours have been reviewed.    Diagnostic Results:  None

## 2018-12-31 NOTE — PLAN OF CARE
Problem: Patient Care Overview  Goal: Plan of Care Review  Outcome: Ongoing (interventions implemented as appropriate)  Patient aaox4. Up with 1-2 assistance and use of bedside commode. Ambulation encouraged. Walked valladares with PT/OT.  Family at bedside, attentive to patient's needs. Regular diet, JHON with tubefeeds at 50ml/hr continued.accuchecks ac/hs.  Eating PO encouraged. Complaint of sore/burning at base of throat when taking anything PO. Patient willing to take small medications PO but requesting larger ones be crushed and placed through JHON. Seen by speech today; recommend large pills placed in puree for swallowing. Right arm midline. Standard precautions maintained.

## 2018-12-31 NOTE — PLAN OF CARE
Problem: Occupational Therapy Goal  Goal: Occupational Therapy Goal  Goals to be met by: 12/31/18 ( Goals revised: 12/18)    Patient will increase functional independence with ADLs by performing:    UE Dressing with Supervision.   LE Dressing with Minimal Assistance.( met with socks; continue with pants)  Grooming while standing at sink with Stand-by Assistance.  Toileting from toilet with Moderate Assistance for hygiene and clothing management.   Toilet transfer to toilet with moderate assistance.  Upper extremity  theraband exercise program x  15 reps  with independence. Met 12/9            Continue  OT POC    Comments: Tristan Spencer OTR/L  12/31/2018

## 2018-12-31 NOTE — ASSESSMENT & PLAN NOTE
- 2 weeks for DEL prior to transplant.  - Renal function slow to recover post-op.   - Nephrology consulted for management.   - Cont with HD as per nephrology recs.  Apprec recs.    - Lasix 160 mg challenge 11/28 w/ minimal output.    - HD MWF. Tolerated well 12/26.    - Held HD on 12/28.   - Strict I&Os.   - Albumin 25% x 3 doses 12/28.  -Renal function continuing to improve. Creatinine decreased to 1.5 from 1.8 and increasing UOP. Has not required HD since Wednesday 12/26. Continue to monitor.

## 2018-12-31 NOTE — ASSESSMENT & PLAN NOTE
- Intermittent, worse over past 10 days,  Changed Pepcid to Protonix 12/9/18.  Appetite seems to be slowly improving.    - Encourage multiple, small meals and cont PRN anti-emetics.    - GI consulted.  EGD 12/24 w normal esophagus, erythematous mucosa in the antrum and nodular mucosa in the duodenal bulb w biopsies.  Path pending.  - JHON placed 12/24.  Pt tolerating tube feeds.  Denies nausea w TF.

## 2018-12-31 NOTE — PLAN OF CARE
Problem: Physical Therapy Goal  Goal: Physical Therapy Goal  Goals to be met by: 2018     Patient will increase functional independence with mobility by performin. Supine to sit with Modified [place] -not met  2. Sit to stand transfer with [place] -not met  3. Bed to chair transfer with independence using no AD -not met  4. Gait  x150 feet with Supervision using LRAD. -not met  5. Lower extremity exercise program x20 reps per handout, with independence -not met           Outcome: Ongoing (interventions implemented as appropriate)  Goals reviewed and remain appropriate. Pt progressing towards goals.    Ramandeep Kumar, PT, DPT   2018  [phone]

## 2018-12-31 NOTE — ASSESSMENT & PLAN NOTE
-  Decreased PO intake  -  s/p EGD 12/24->antrum and duodenal bulb bx  -  prabhakar tube placed afterwards-->TF started for nutrition  -  Appears more energetic with TFs  - final nutrition plan pending

## 2018-12-31 NOTE — ASSESSMENT & PLAN NOTE
- LFTs now improving slowly.  T bili was 37.6 day of transplant.  - Drain placed during take back. Drain placed to intra-abdominal abscess on 11/22.   - Fluid from collection noted with enterococcus VRE completed Zyvox treatment.  - Routine 1 month Liver US 12/17 which showed elevated velocity of hepatic artery and low RIs.   - Vascular Surgery consulted. Recommend repeat US in 10-14 days (12/27-12/31) to reassess. Appreciate Vascular recs.  LFTs stable.  - Liver US 12/28 to reassess HAS revealed elevated but slightly improved main hepatic artery velocity and mildly improved RIs.   - Will repeat US in 2 weeks.

## 2018-12-31 NOTE — ASSESSMENT & PLAN NOTE
-Repeat US on 12.28 resulted as slight improvement, the main hepatic artery remains elevated and the resistive indices have minimally improved, findings suggestive of hepatic artery stenosis with new left perihepatic fluid collection small focal lesion within the right hepatic lobe, measuring 1.2 cm, compatible with a hepatic cyst versus fluid collection.

## 2018-12-31 NOTE — PT/OT/SLP PROGRESS
Occupational Therapy   Co-Treatment    Name: Femi Enciso  MRN: [id number]  Admitting Diagnosis:  S/P liver transplant  7 Days Post-Op    Recommendations:     Discharge Recommendations: nursing facility, skilled  Discharge Equipment Recommendations:  (tbd)  Barriers to discharge:  None    Subjective     Pain/Comfort:  · Pain Rating 1: 0/10  · Pain Rating Post-Intervention 1: 0/10    Objective:     Communicated with: RN prior to session.  Patient found with all lines intact, call button in reach and mother present and telemetry, NG tube upon OT entry to room.    General Precautions: Standard, fall   Orthopedic Precautions:N/A   Braces: N/A     Occupational Performance:    Bed Mobility:    · Patient completed Rolling/Turning to Left with  stand by assistance  · Patient completed Scooting/Bridging with stand by assistance  · Patient completed Supine to Sit with minimum assistance     Functional Mobility/Transfers:  · Patient completed Sit <> Stand Transfer with minimum assistance, moderate assistance and trials 1 and 2 at min a w/ trial 3 at mod a  with  rolling walker   · Patient completed Bed <> Chair Transfer using Step Transfer technique with minimum assistance and min a to control descent with rolling walker  · Functional Mobility: Pt ambulated 8 ft then 12 ft at min a for cues w/ RW. PT worked on gait training while OT worked on balance and posture.      Activities of Daily Living:  · Grooming: set-up assistance  facial hygiene while seated in bedside chair  · Upper Body Dressing: minimum assistance donned gown as robe seated EOB  · Lower Body Dressing: set-up assistance to milady socks seated EOB        [facility] 6 Click ADL: 18    Treatment & Education:  Pt educated on POC.  OT provided psychosocial support in the form of reassurance and encouragement.     Patient left up in chair with all lines intact, call button in reach, RN notified and mother present  Education:    Assessment:     Femi Enciso is a 53 y.o. male with a  medical diagnosis of S/P liver transplant. Pt presents as fair for ability to tolerate therapy. Pt displayed increased overall ability to perform oob activities. Pt displayed increased ability and tolerance for ambulation. Despite pt increased overall tolerance for therapy; pt continues to display self-limiting behaviors throughout session. Pt progressing towards goals. Pt displayed global deconditioning requiring increased assist for ADLs and mobility at this time. Pt would benefit from skilled OT services to improve independence and overall occupational functioning.      He presents with the following performance deficits affecting function are weakness, impaired endurance, impaired self care skills, impaired functional mobilty, gait instability, impaired balance, decreased lower extremity function, impaired cardiopulmonary response to activity.     Rehab Prognosis:  Good; patient would benefit from acute skilled OT services to address these deficits and reach maximum level of function.       Plan:     Patient to be seen 4 x/week to address the above listed problems via self-care/home management, therapeutic activities, therapeutic exercises, neuromuscular re-education  · Plan of Care Expires: 01/20/19  · Plan of Care Reviewed with: patient    This Plan of care has been discussed with the patient who was involved in its development and understands and is in agreement with the identified goals and treatment plan    GOALS:   Multidisciplinary Problems     Occupational Therapy Goals        Problem: Occupational Therapy Goal    Goal Priority Disciplines Outcome Interventions   Occupational Therapy Goal     OT, PT/OT Ongoing (interventions implemented as appropriate)    Description:  Goals to be met by: 12/31/18 ( Goals revised: 12/18)    Patient will increase functional independence with ADLs by performing:    UE Dressing with Supervision.   LE Dressing with Minimal Assistance.( met with socks; continue with  pants)  Grooming while standing at sink with Stand-by Assistance.  Toileting from toilet with Moderate Assistance for hygiene and clothing management.   Toilet transfer to toilet with moderate assistance.  Upper extremity  theraband exercise program x  15 reps  with independence. Met 12/9                       Multidisciplinary Problems (Resolved)        Problem: Occupational Therapy Goal    Goal Priority Disciplines Outcome Interventions   Occupational Therapy Goal   (Resolved)     OT, PT/OT Resolved for Upgrade                    Time Tracking:     OT Date of Treatment: 12/31/18  OT Start Time: 0844  OT Stop Time: 0912  OT Total Time (min): 28 min    Billable Minutes:Self Care/Home Management 10 minutes  Therapeutic Activity 18 mintues    Tristan Spencer, OT  12/31/2018

## 2018-12-31 NOTE — PROGRESS NOTES
[facility]  Liver Transplant  Progress Note    Patient Name: Femi Enciso  MRN: [id number]  Admission Date: 10/27/2018  Hospital Length of Stay: 65 days  Code Status: Full Code  Primary Care Provider: Primary Doctor No  Post-Operative Day: 50    ORGAN:   LIVER  Disease Etiology: Alcoholic Cirrhosis  Donor Type:    - Brain Death  CDC High Risk:   No  Donor CMV Status:   Donor CMV Status: Positive  Donor HBcAB:   Negative  Donor HCV Status:   Negative  Donor HBV JAVIER: Negative  Donor HCV JAVIER: Negative  Whole or Partial: Whole Liver  Biliary Anastomosis: End to End  Arterial Anatomy: Standard  Subjective:     History of Present Illness:  Femi Enciso is a 53yr old male with PMH of acute ETOH hepatitis and DEL/ATN evolved to ESRD requiring HD (not candidate for KTX). He is now s/p liver transplant (SM induction, DBD, CMV D+/R-). Transplant surgery noted severe collateralization, adrenal gland firmly adhered to liver. Bare area of adrenal gland required several stitches and packing to obtain fair hemostasis (EBL 15L). OR take back  for bleeding from R adrenal bed in AM (significant clot in retroperitoneum, posterior to R hepatic lobe, tract posterior to the R kidney containing significant clot, small bleeding area of retroperitoneal fat) then returned to surgery same day for hemorrhaging closed with wound vac with plans to return to OR 24-48 hours for closure (retroperitoneal /retrorenal/retrocolic hematoma on the right ). Raw retroperitoneal bleeding. Suture ligatures placed, argon beam cautery, evarest placed in retroperitoneum behind cava and right kidney. Significant oozing from upper right adrenal gland, not amenable to ligation, that portion of the adrenal gland was resected with cautery). OR take back  for washout and incisional closure, no significant bleeding or hematoma found. OR cultures   from body fluid grew enterococcus faecium VRE. ID was consulted,  started on  daptomycin for VRE treatment and cefepime/flagyl to cover for IA ty in bile. Patient with significant leukocytosis with peak on 11/22 at 48K prompting drainage of R subphrenic fluid collection, perc drain placed, culture grew VRE. Stroke code called 11/21 for lethargy and unresponsive, CT head without evidence of acute ischemic changes and CTA chest negative, vascular neurology was consulted recommended MRI brain, but patient's AMS improved shortly after event. Nephrology consulted for ESRD requiring HD. Patient overall improved on antibiotic regimen, leukocytosis and AMS improved. He was transferred to TSU on POD #15.     Hospital Course:  Pt c/o CP 12/21. EKG stable from prior. Troponin wnl. CP resolved 12/22. CXR 12/20 showed no detrimental change. Pt hypertensive prior to HD.  Dialysis tolerated well 12/21 with 1.5L taken off. Bps much improved after dialysis, but monitoring closely.    Interval History:  No acute events overnight. Patient feeling okay today. Remains with minimal po intake. Plan to start scheduled remeron tonight. Kidney function continues to improve. Creatinine decreased to 1.5 from 1.8. Increased UOP, ~700 cc past 24 hours. Has not required HD since last Wednesday 12/26/18.  Pt participating with PT/OT. Pt will transfer to Rehab when medically appropriate and off tube feedings. Monitor.     Scheduled Meds:   aspirin  81 mg Oral Daily    epoetin sherlyn (PROCRIT) injection  50 Units/kg (Dosing Weight) Intravenous Every Mon, Wed, Fri    ergocalciferol  50,000 Units Oral Q7 Days    escitalopram oxalate  20 mg Oral Daily    fluconazole 40 mg/ml  200 mg Oral Daily    heparin (porcine)  5,000 Units Subcutaneous Q8H    lidocaine  1 patch Transdermal Q24H    metoprolol  12.5 mg Per NG tube BID    mirtazapine  15 mg Oral Nightly    mirtazapine  7.5 mg Oral Nightly    mycophenolate mofetil  500 mg Oral BID    pantoprazole  40 mg Intravenous BID    [START ON 1/1/2019]  sulfamethoxazole-trimethoprim 400-80mg  1 tablet Oral Daily    tacrolimus  1 mg Sublingual BID    [START ON 1/1/2019] valganciclovir 50 mg/ml  450 mg Oral Daily     Continuous Infusions:  PRN Meds:acetaminophen, albuterol-ipratropium, aluminum & magnesium hydroxide-simethicone, artificial tears, bacitracin, bisacodyl, dextrose 50%, dextrose 50%, glucagon (human recombinant), glucose, glucose, heparin (porcine), ondansetron, tiZANidine, traMADol    Review of Systems   Constitutional: Positive for activity change and appetite change (decreased). Negative for fatigue and fever.   HENT: Negative for congestion, facial swelling, sore throat and voice change.    Eyes: Negative for pain, discharge and visual disturbance.   Respiratory: Negative for apnea, cough, choking, chest tightness, shortness of breath and wheezing.    Cardiovascular: Negative for chest pain, palpitations and leg swelling.   Gastrointestinal: Positive for nausea. Negative for abdominal distention, abdominal pain, constipation, diarrhea and vomiting.   Endocrine: Negative.    Genitourinary: Negative for difficulty urinating, dysuria, hematuria and urgency.   Musculoskeletal: Negative.  Negative for back pain, gait problem, neck pain and neck stiffness.   Skin: Positive for wound. Negative for color change and pallor.   Allergic/Immunologic: Positive for immunocompromised state.   Neurological: Positive for weakness. Negative for dizziness, seizures, light-headedness and headaches.   Psychiatric/Behavioral: Negative for behavioral problems, confusion, decreased concentration, dysphoric mood, hallucinations, sleep disturbance and suicidal ideas. The patient is not nervous/anxious.      Objective:     Vital Signs (Most Recent):  Temp: 98.5 °F (36.9 °C) (12/31/18 0745)  Pulse: 84 (12/31/18 1200)  Resp: 20 (12/31/18 1200)  BP: 128/87 (12/31/18 1200)  SpO2: 100 % (12/31/18 1200) Vital Signs (24h Range):  Temp:  [98.3 °F (36.8 °C)-98.6 °F (37 °C)] 98.5 °F  (36.9 °C)  Pulse:  [82-89] 84  Resp:  [19-31] 20  SpO2:  [98 %-100 %] 100 %  BP: (128-147)/(84-92) 128/87     Weight: 70.3 kg (154 lb 15.7 oz)  Body mass index is 22.24 kg/m².    Intake/Output - Last 3 Shifts       12/29 0700 - 12/30 0659 12/30 0700 - 12/31 0659 12/31 0700 - 01/01 0659    P.O. 650 380 120    I.V. (mL/kg)  100 (1.4)     NG/GT 1750 720 800    Total Intake(mL/kg) 2400 (36.5) 1200 (17.1) 920 (13.1)    Urine (mL/kg/hr) 270 (0.2) 675 (0.4)     Emesis/NG output  0     Other  0     Stool 0 0     Blood  0     Total Output 270 675     Net +2130 +525 +920           Urine Occurrence  0 x     Stool Occurrence 1 x 1 x     Emesis Occurrence  0 x           Physical Exam   Constitutional: He is oriented to person, place, and time. He appears well-developed. No distress.   Temporal and distal extremity muscle wasting noted.   HENT:   Head: Normocephalic and atraumatic.   Mouth/Throat: Oropharynx is clear and moist. No oropharyngeal exudate.   Eyes: EOM are normal.   Neck: Normal range of motion. Neck supple. No JVD present.   Cardiovascular: Normal rate, regular rhythm, normal heart sounds and intact distal pulses.   No murmur heard.  Pulmonary/Chest: Effort normal. No respiratory distress. He has decreased breath sounds in the right lower field and the left lower field. He has no wheezes. He exhibits no tenderness.   Abdominal: Soft. Bowel sounds are normal. He exhibits no distension. There is no tenderness.   Chevron inc clean, dry, intact with steri strips in place     Musculoskeletal: Normal range of motion. He exhibits no edema or tenderness.   Neurological: He is alert and oriented to person, place, and time. He has normal reflexes.   Skin: Skin is warm and dry. Capillary refill takes 2 to 3 seconds. He is not diaphoretic. No pallor.   Psychiatric: He has a normal mood and affect. His behavior is normal. Thought content normal. His affect is not labile. He is not slowed. Cognition and memory are not impaired.  "  Nursing note and vitals reviewed.      Laboratory:  Immunosuppressants         Stop Route Frequency     tacrolimus capsule 1 mg      -- SL 2 times daily     mycophenolate mofetil 200 mg/mL suspension 500 mg      -- Oral 2 times daily        CBC:   Recent Labs   Lab 12/31/18  0656   WBC 10.94   RBC 2.81*   HGB 7.8*   HCT 25.4*      MCV 90   MCH 27.8   MCHC 30.7*     CMP:   Recent Labs   Lab 12/31/18  0656   *   CALCIUM 9.2   ALBUMIN 2.4*   PROT 6.0      K 3.6   CO2 26      BUN 54*   CREATININE 1.5*   ALKPHOS 166*   ALT 10   AST 12   BILITOT 0.9     Labs within the past 24 hours have been reviewed.    Diagnostic Results:  None    Assessment/Plan:     * S/P liver transplant    - LFTs now improving slowly.  T bili was 37.6 day of transplant.  - Drain placed during take back. Drain placed to intra-abdominal abscess on 11/22.   - Fluid from collection noted with enterococcus VRE completed Zyvox treatment.  - Routine 1 month Liver US 12/17 which showed elevated velocity of hepatic artery and low RIs.   - Vascular Surgery consulted. Recommend repeat US in 10-14 days (12/27-12/31) to reassess. Appreciate Vascular recs.  LFTs stable.  - Liver US 12/28 to reassess HAS revealed elevated but slightly improved main hepatic artery velocity and mildly improved RIs.   - Will repeat US in 2 weeks.        Other dysphagia    -pt reports difficulty swallowing pills  -SLP consult, appreciate recs  -switched PO meds that pt is unable to swallow to liquid/IV       Hypokalemia    -Continue to monitor and replace as needed     Acute kidney failure with lesion of tubular necrosis    - Cont Dialysis MWF.  - Nephrology following, appreciate recs.  - Monitor.     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       Hypomagnesemia    - Continue to monitor & replace as needed.     Severe malnutrition    - Dietician following. Severe temporal, clavicle muscle depletion " noted. Severe subq fat loss  - Nutrition has been poor over the past 24 hours.   - Discussed at length with patient and caregivers that if patient's nutrition status does not improve, will need supplemental nutrition via tube feeds or TPN.  - Appetite stimulant started 12/19.   - Caregivers to bring in outside food for patient.   - Continue calorie count.  - EGD Monday 12/24 as pt c/o poor appetite + burning in chest/esophagus and occasionally vomiting after eating x several weeks.   - EGD 12/24 unremarkable. jhon tube placed afterwards.   - TF started for nutrition 12/24/18. TF at goal rate, 50 ml/hr.  - Prealbumin 7 on 12/26.  -Start scheduled remeron 12/31.      Nausea    - Intermittent, worse over past 10 days,  Changed Pepcid to Protonix 12/9/18.  Appetite seems to be slowly improving.    - Encourage multiple, small meals and cont PRN anti-emetics.    - GI consulted.  EGD 12/24 w normal esophagus, erythematous mucosa in the antrum and nodular mucosa in the duodenal bulb w biopsies.  Path pending.  - JHON placed 12/24.  Pt tolerating tube feeds.  Denies nausea w TF.           Anxiety    - Restarted Lexapro as per psych recs, 12/13.   - Monitor.     Prolonged Q-T interval on ECG    -  ms on EKG 12/19.  -  on EKG 12/21.  - Monitor.       Alcohol use disorder, severe, in early remission, dependence    - Monitor.       Decreased appetite    - Continue appetite stimulant.  - GI consulted as pt c/o burning sensation in chest/esophagus + vomiting after eating, passed SLP eval, recommended GI f/u.  - Cont to encourage PO intake. Ordered additional supplements.   - EGD 12/24, unremarkable. jhon tube placed and TF started. Increased TF to 45 ml, new goal rate 50 ml/hr.  - Prealbumin 7 on 12/26.     Acute renal failure with acute tubular necrosis superimposed on stage 3 chronic kidney disease    - 2 weeks for DEL prior to transplant.  - Renal function slow to recover post-op.   - Nephrology consulted for  management.   - Cont with HD as per nephrology recs.  Apprec recs.    - Lasix 160 mg challenge 11/28 w/ minimal output.    - HD MWF. Tolerated well 12/26.    - Held HD on 12/28.   - Strict I&Os.   - Albumin 25% x 3 doses 12/28.  -Renal function continuing to improve. Creatinine decreased to 1.5 from 1.8 and increasing UOP. Has not required HD since Wednesday 12/26. Continue to monitor.      Intra-abdominal abscess    - Significant increase in WBC post-op.   - OR cultures from 11/18 noted with enterococcus VRE.   - Abdominal CT performed at that time with fluid collection and drain placed on 11/22 for drainage.   - Intra-abdominal abscess culture also with enterococcus VRE.   - ID consulted and pt placed on antibxs for treatment. Pt was on Cefepime, Daptomycin, and Fluconazole for treatment.    - Pt remains with RLQ drains (one JOSE A and one Percutaneous).  One JOSE A removed 11/28.  Perc drain in placed draining tan, clear drainage.  WBC slowly improving.   - Liver US on 11/26 reviewed.   - Abdominal CT performed 11/27 and reviewed. Fluid collection noted with improvement on CT.  ID following and reassured by findings.    - Dapto, Cefepime and Flagyl changed 11/30/18 to Linezolid 600 mg PO q 12 hr x 10 days per ID.     - Added back cefepime after thora.  ID re consulted.  - Completed Zyvox x 10 days per ID thru 12/9/18. Monitor.        Long-term use of immunosuppressant medication    - Cont with prograf. Draw prograf level daily and adjust dose as needed to maintain a therapeutic level.   - restarted MMF on 12/29       At risk for opportunistic infections    - Cont with bactrim and valcyte for protection against opportunistic infections.   - Cont Isavuconazonium for fungal prophylaxis.     Adrenal cortical steroids causing adverse effect in therapeutic use    - Monitor.       Prophylactic immunotherapy    - See long term immunosuppression.        Debility    - Pt remains significantly debilitated.   - PT/OT for  strengthening.   - Currently will need SNF for placement and further rehabilitation.   - Pt remains severely debilitated and not strong enough for SNF at this time.    - SNF consult placed 12/17. However, denied for SNF again as not strong enough at this time.  - Rehab consulted.  We now have a contract w [facility] for rehab.  Rehab willing to accept patient when medically appropriate.    - Continue aggressive therapy.     Pleural effusion associated with hepatic disorder    - c/o increased pain w deep breath today to right side.  CXR showed increased opacity in the inferior hemothorax on the right side since 11/26/2018.  Pulse ox 97-99% on RA.  Cont to monitor.   - continues to have fluid to RLL.  WBC remains elevated.    - thoracentesis performed on 11/30. Fluid noted with 4k WBC, 93 SEGS. cx no growth to date.  Cefepime added for treatment.  - Chest CT showing right pleural effusion improved, left w increased pleural effusion.   - Per ID, completed treatment of empyema w Cefepime thru 12/8.  - sats remain 97-99% on RA.  - CXR 12/20 showed left hemidiaphragmatic margin that is better delineated than on 12/19/2018, consistent with improved aeration in the left lower lobe. + no significant detrimental interval change in the appearance of the chest, with the current examination demonstrating a slightly improved inspiratory depth level.    - denies SOB.  Last CXR 12/22 w improvement.     Type 2 diabetes mellitus without complication    - Endocrine consulted for management. Appreciate recs.   - Hypoglycemia 12/10 requiring D50.    - Repeat cx 12/11 - no growth.  - Blood glucose readings well controlled on tube feeds.     Anemia of chronic disease    - Last liver US 11/27. Evolving peritransplant hematomas with anechoic fluid collection adjacent to the right hepatic lobe.  Small volume perihepatic free fluid.  - Chest/Abd/pelvis CT 12/4 - no fluid to be drainged per transplant surgeon.  No source of bleeding.    - Last  transfused 12/26 w HD w appropriate response.  Monitor with daily labs.          VTE Risk Mitigation (From admission, onward)        Ordered     heparin (porcine) injection 5,000 Units  Every 8 hours      12/24/18 0742     heparin (porcine) injection 1,000 Units  As needed (PRN)      12/12/18 1731     IP VTE HIGH RISK PATIENT  Once      11/11/18 1208          The patients clinical status was discussed at multidisplinary rounds, involving transplant surgery, transplant medicine, pharmacy, nursing, nutrition, and social work    Discharge Planning: Not a candidate for discharge at this time.   No Patient Care Coordination Note on file.      Manda Jackson PA-C  Liver Transplant  [facility]

## 2018-12-31 NOTE — PLAN OF CARE
Problem: SLP Goal  Goal: SLP Goal  Speech Language Pathology Goals  Goals expected to be met by 1/6  1. Pt will tolerate thin liquids without overt s/s aspiration.   2. Pt will verbalize swallow precs indptly.        Outcome: Outcome(s) achieved Date Met: 12/31/18  Patient tolerating regular solids and thin liquids with no overt s/s of aspiration. Patient reports difficulty with very large pills only. Compensatory strategies discussed. No further skilled acute ST services warranted at this time. Please re-consult as needed.   SHUBHAM Santos., CCC-SLP  Pager: [phone]  12/31/2018

## 2019-01-01 PROBLEM — K65.1 INTRA-ABDOMINAL ABSCESS: Status: RESOLVED | Noted: 2018-11-27 | Resolved: 2019-01-01

## 2019-01-01 LAB
ALBUMIN SERPL BCP-MCNC: 2.3 G/DL
ALBUMIN SERPL BCP-MCNC: 2.5 G/DL
ALP SERPL-CCNC: 187 U/L
ALP SERPL-CCNC: 218 U/L
ALT SERPL W/O P-5'-P-CCNC: 12 U/L
ALT SERPL W/O P-5'-P-CCNC: 14 U/L
ANION GAP SERPL CALC-SCNC: 11 MMOL/L
ANION GAP SERPL CALC-SCNC: 11 MMOL/L
AST SERPL-CCNC: 13 U/L
AST SERPL-CCNC: 16 U/L
BASOPHILS # BLD AUTO: 0.2 K/UL
BASOPHILS NFR BLD: 2 %
BILIRUB SERPL-MCNC: 0.9 MG/DL
BILIRUB SERPL-MCNC: 1.1 MG/DL
BUN SERPL-MCNC: 60 MG/DL
BUN SERPL-MCNC: 60 MG/DL
CALCIUM SERPL-MCNC: 9.3 MG/DL
CALCIUM SERPL-MCNC: 9.4 MG/DL
CHLORIDE SERPL-SCNC: 103 MMOL/L
CHLORIDE SERPL-SCNC: 105 MMOL/L
CK MB SERPL-MCNC: 0.7 NG/ML
CK MB SERPL-RTO: ABNORMAL %
CK SERPL-CCNC: <7 U/L
CO2 SERPL-SCNC: 26 MMOL/L
CO2 SERPL-SCNC: 27 MMOL/L
CREAT SERPL-MCNC: 1.4 MG/DL
CREAT SERPL-MCNC: 1.5 MG/DL
DIFFERENTIAL METHOD: ABNORMAL
EOSINOPHIL # BLD AUTO: 1 K/UL
EOSINOPHIL NFR BLD: 9.8 %
ERYTHROCYTE [DISTWIDTH] IN BLOOD BY AUTOMATED COUNT: 15.7 %
EST. GFR  (AFRICAN AMERICAN): >60 ML/MIN/1.73 M^2
EST. GFR  (AFRICAN AMERICAN): >60 ML/MIN/1.73 M^2
EST. GFR  (NON AFRICAN AMERICAN): 52.4 ML/MIN/1.73 M^2
EST. GFR  (NON AFRICAN AMERICAN): 57 ML/MIN/1.73 M^2
FUNGUS SPEC CULT: NORMAL
GLUCOSE SERPL-MCNC: 131 MG/DL
GLUCOSE SERPL-MCNC: 167 MG/DL
HCT VFR BLD AUTO: 25.3 %
HGB BLD-MCNC: 7.9 G/DL
IMM GRANULOCYTES # BLD AUTO: 0.15 K/UL
IMM GRANULOCYTES NFR BLD AUTO: 1.5 %
LYMPHOCYTES # BLD AUTO: 1 K/UL
LYMPHOCYTES NFR BLD: 10 %
MAGNESIUM SERPL-MCNC: 1.6 MG/DL
MCH RBC QN AUTO: 28.6 PG
MCHC RBC AUTO-ENTMCNC: 31.2 G/DL
MCV RBC AUTO: 92 FL
MONOCYTES # BLD AUTO: 0.8 K/UL
MONOCYTES NFR BLD: 7.7 %
NEUTROPHILS # BLD AUTO: 6.8 K/UL
NEUTROPHILS NFR BLD: 69 %
NRBC BLD-RTO: 0 /100 WBC
PHOSPHATE SERPL-MCNC: 3.5 MG/DL
PLATELET # BLD AUTO: 356 K/UL
PMV BLD AUTO: 12.1 FL
POCT GLUCOSE: 198 MG/DL (ref 70–110)
POCT GLUCOSE: 225 MG/DL (ref 70–110)
POTASSIUM SERPL-SCNC: 3.4 MMOL/L
POTASSIUM SERPL-SCNC: 4 MMOL/L
PROT SERPL-MCNC: 6 G/DL
PROT SERPL-MCNC: 6.5 G/DL
RBC # BLD AUTO: 2.76 M/UL
SODIUM SERPL-SCNC: 141 MMOL/L
SODIUM SERPL-SCNC: 142 MMOL/L
TACROLIMUS BLD-MCNC: 5.1 NG/ML
TROPONIN I SERPL DL<=0.01 NG/ML-MCNC: 0.01 NG/ML
WBC # BLD AUTO: 9.79 K/UL

## 2019-01-01 PROCEDURE — 25000003 PHARM REV CODE 250: Performed by: NURSE PRACTITIONER

## 2019-01-01 PROCEDURE — 83735 ASSAY OF MAGNESIUM: CPT

## 2019-01-01 PROCEDURE — 25000003 PHARM REV CODE 250: Performed by: STUDENT IN AN ORGANIZED HEALTH CARE EDUCATION/TRAINING PROGRAM

## 2019-01-01 PROCEDURE — 99900035 HC TECH TIME PER 15 MIN (STAT)

## 2019-01-01 PROCEDURE — 93010 EKG 12-LEAD: ICD-10-PCS | Mod: ,,, | Performed by: INTERNAL MEDICINE

## 2019-01-01 PROCEDURE — 80197 ASSAY OF TACROLIMUS: CPT

## 2019-01-01 PROCEDURE — 20600001 HC STEP DOWN PRIVATE ROOM

## 2019-01-01 PROCEDURE — 63600175 PHARM REV CODE 636 W HCPCS: Performed by: NURSE PRACTITIONER

## 2019-01-01 PROCEDURE — 94640 AIRWAY INHALATION TREATMENT: CPT

## 2019-01-01 PROCEDURE — 82553 CREATINE MB FRACTION: CPT

## 2019-01-01 PROCEDURE — 80053 COMPREHEN METABOLIC PANEL: CPT | Mod: 91

## 2019-01-01 PROCEDURE — 84484 ASSAY OF TROPONIN QUANT: CPT

## 2019-01-01 PROCEDURE — 82550 ASSAY OF CK (CPK): CPT

## 2019-01-01 PROCEDURE — 84100 ASSAY OF PHOSPHORUS: CPT

## 2019-01-01 PROCEDURE — 94761 N-INVAS EAR/PLS OXIMETRY MLT: CPT

## 2019-01-01 PROCEDURE — 99233 PR SUBSEQUENT HOSPITAL CARE,LEVL III: ICD-10-PCS | Mod: 24,,, | Performed by: NURSE PRACTITIONER

## 2019-01-01 PROCEDURE — 25000242 PHARM REV CODE 250 ALT 637 W/ HCPCS: Performed by: STUDENT IN AN ORGANIZED HEALTH CARE EDUCATION/TRAINING PROGRAM

## 2019-01-01 PROCEDURE — 93005 ELECTROCARDIOGRAM TRACING: CPT

## 2019-01-01 PROCEDURE — 93010 ELECTROCARDIOGRAM REPORT: CPT | Mod: ,,, | Performed by: INTERNAL MEDICINE

## 2019-01-01 PROCEDURE — 85025 COMPLETE CBC W/AUTO DIFF WBC: CPT

## 2019-01-01 PROCEDURE — C9113 INJ PANTOPRAZOLE SODIUM, VIA: HCPCS | Performed by: NURSE PRACTITIONER

## 2019-01-01 PROCEDURE — 63600175 PHARM REV CODE 636 W HCPCS: Performed by: PHYSICIAN ASSISTANT

## 2019-01-01 PROCEDURE — 94664 DEMO&/EVAL PT USE INHALER: CPT

## 2019-01-01 PROCEDURE — 25000003 PHARM REV CODE 250: Performed by: PHYSICIAN ASSISTANT

## 2019-01-01 PROCEDURE — 99233 SBSQ HOSP IP/OBS HIGH 50: CPT | Mod: 24,,, | Performed by: NURSE PRACTITIONER

## 2019-01-01 PROCEDURE — 80053 COMPREHEN METABOLIC PANEL: CPT

## 2019-01-01 PROCEDURE — 36415 COLL VENOUS BLD VENIPUNCTURE: CPT

## 2019-01-01 RX ORDER — FUROSEMIDE 10 MG/ML
60 INJECTION INTRAMUSCULAR; INTRAVENOUS ONCE
Status: COMPLETED | OUTPATIENT
Start: 2019-01-01 | End: 2019-01-01

## 2019-01-01 RX ORDER — POTASSIUM CHLORIDE 20 MEQ/15ML
40 SOLUTION ORAL ONCE
Status: COMPLETED | OUTPATIENT
Start: 2019-01-01 | End: 2019-01-01

## 2019-01-01 RX ORDER — INSULIN ASPART 100 [IU]/ML
0-5 INJECTION, SOLUTION INTRAVENOUS; SUBCUTANEOUS
Status: DISCONTINUED | OUTPATIENT
Start: 2019-01-01 | End: 2019-01-08 | Stop reason: HOSPADM

## 2019-01-01 RX ADMIN — Medication 12.5 MG: at 08:01

## 2019-01-01 RX ADMIN — POTASSIUM CHLORIDE 40 MEQ: 20 SOLUTION ORAL at 11:01

## 2019-01-01 RX ADMIN — PANTOPRAZOLE SODIUM 40 MG: 40 INJECTION, POWDER, LYOPHILIZED, FOR SOLUTION INTRAVENOUS at 08:01

## 2019-01-01 RX ADMIN — LIDOCAINE 1 PATCH: 50 PATCH CUTANEOUS at 06:01

## 2019-01-01 RX ADMIN — TRAMADOL HYDROCHLORIDE 50 MG: 50 TABLET, FILM COATED ORAL at 03:01

## 2019-01-01 RX ADMIN — MIRTAZAPINE 15 MG: 15 TABLET, ORALLY DISINTEGRATING ORAL at 08:01

## 2019-01-01 RX ADMIN — ASPIRIN 81 MG CHEWABLE TABLET 81 MG: 81 TABLET CHEWABLE at 08:01

## 2019-01-01 RX ADMIN — FUROSEMIDE 60 MG: 10 INJECTION, SOLUTION INTRAMUSCULAR; INTRAVENOUS at 06:01

## 2019-01-01 RX ADMIN — ESCITALOPRAM OXALATE 20 MG: 5 SOLUTION ORAL at 08:01

## 2019-01-01 RX ADMIN — INSULIN ASPART 1 UNITS: 100 INJECTION, SOLUTION INTRAVENOUS; SUBCUTANEOUS at 09:01

## 2019-01-01 RX ADMIN — TACROLIMUS 1 MG: 1 CAPSULE ORAL at 08:01

## 2019-01-01 RX ADMIN — MORPHINE 450 MG: 10 SOLUTION ORAL at 08:01

## 2019-01-01 RX ADMIN — TIZANIDINE 2 MG: 2 TABLET ORAL at 11:01

## 2019-01-01 RX ADMIN — TIZANIDINE 2 MG: 2 TABLET ORAL at 05:01

## 2019-01-01 RX ADMIN — HEPARIN SODIUM 5000 UNITS: 5000 INJECTION, SOLUTION INTRAVENOUS; SUBCUTANEOUS at 08:01

## 2019-01-01 RX ADMIN — Medication 500 MG: at 08:01

## 2019-01-01 RX ADMIN — TIZANIDINE 2 MG: 2 TABLET ORAL at 08:01

## 2019-01-01 RX ADMIN — IPRATROPIUM BROMIDE AND ALBUTEROL SULFATE 3 ML: .5; 3 SOLUTION RESPIRATORY (INHALATION) at 03:01

## 2019-01-01 RX ADMIN — HEPARIN SODIUM 5000 UNITS: 5000 INJECTION, SOLUTION INTRAVENOUS; SUBCUTANEOUS at 02:01

## 2019-01-01 RX ADMIN — HEPARIN SODIUM 5000 UNITS: 5000 INJECTION, SOLUTION INTRAVENOUS; SUBCUTANEOUS at 06:01

## 2019-01-01 RX ADMIN — FLUCONAZOLE 200 MG: 40 POWDER, FOR SUSPENSION ORAL at 08:01

## 2019-01-01 RX ADMIN — SULFAMETHOXAZOLE AND TRIMETHOPRIM 1 TABLET: 400; 80 TABLET ORAL at 08:01

## 2019-01-01 RX ADMIN — TACROLIMUS 1 MG: 1 CAPSULE ORAL at 05:01

## 2019-01-01 NOTE — PROGRESS NOTES
[facility]  Liver Transplant  Progress Note    Patient Name: Femi Enciso  MRN: [id number]  Admission Date: 10/27/2018  Hospital Length of Stay: 66 days  Code Status: Full Code  Primary Care Provider: Primary Doctor No  Post-Operative Day: 51    ORGAN:   LIVER  Disease Etiology: Alcoholic Cirrhosis  Donor Type:    - Brain Death  CDC High Risk:   No  Donor CMV Status:   Donor CMV Status: Positive  Donor HBcAB:   Negative  Donor HCV Status:   Negative  Donor HBV JAVIER: Negative  Donor HCV JAVIER: Negative  Whole or Partial: Whole Liver  Biliary Anastomosis: End to End  Arterial Anatomy: Standard  Subjective:     History of Present Illness:  Femi Enciso is a 53yr old male with PMH of acute ETOH hepatitis and DEL/ATN evolved to ESRD requiring HD (not candidate for KTX). He is now s/p liver transplant (SM induction, DBD, CMV D+/R-). Transplant surgery noted severe collateralization, adrenal gland firmly adhered to liver. Bare area of adrenal gland required several stitches and packing to obtain fair hemostasis (EBL 15L). OR take back  for bleeding from R adrenal bed in AM (significant clot in retroperitoneum, posterior to R hepatic lobe, tract posterior to the R kidney containing significant clot, small bleeding area of retroperitoneal fat) then returned to surgery same day for hemorrhaging closed with wound vac with plans to return to OR 24-48 hours for closure (retroperitoneal /retrorenal/retrocolic hematoma on the right ). Raw retroperitoneal bleeding. Suture ligatures placed, argon beam cautery, evarest placed in retroperitoneum behind cava and right kidney. Significant oozing from upper right adrenal gland, not amenable to ligation, that portion of the adrenal gland was resected with cautery). OR take back  for washout and incisional closure, no significant bleeding or hematoma found. OR cultures   from body fluid grew enterococcus faecium VRE. ID was consulted,  started on  daptomycin for VRE treatment and cefepime/flagyl to cover for IA ty in bile. Patient with significant leukocytosis with peak on 11/22 at 48K prompting drainage of R subphrenic fluid collection, perc drain placed, culture grew VRE. Stroke code called 11/21 for lethargy and unresponsive, CT head without evidence of acute ischemic changes and CTA chest negative, vascular neurology was consulted recommended MRI brain, but patient's AMS improved shortly after event. Nephrology consulted for ESRD requiring HD. Patient overall improved on antibiotic regimen, leukocytosis and AMS improved. He was transferred to TSU on POD #15.     Hospital Course:  Pt c/o CP 12/21. EKG stable from prior. Troponin wnl. CP resolved 12/22. CXR 12/20 showed no detrimental change. Pt hypertensive prior to HD.  Dialysis tolerated well 12/21 with 1.5L taken off. Bps much improved after dialysis, but monitoring closely.    Interval History:  No acute events overnight. Patient feeling okay today. Remains with minimal po intake. Started scheduled remeron 12/31. Kidney function continues to improve. Creatinine stable at 1.5. Increased UOP, ~700 cc past 24 hours. Has not required HD since last Wednesday 12/26/18.  Pt participating with PT/OT. Pt will transfer to Rehab when medically appropriate and off tube feedings. Monitor.     Scheduled Meds:   aspirin  81 mg Oral Daily    epoetin sherlyn (PROCRIT) injection  50 Units/kg (Dosing Weight) Intravenous Every Mon, Wed, Fri    ergocalciferol  50,000 Units Oral Q7 Days    escitalopram oxalate  20 mg Oral Daily    fluconazole 40 mg/ml  200 mg Oral Daily    heparin (porcine)  5,000 Units Subcutaneous Q8H    lidocaine  1 patch Transdermal Q24H    metoprolol  12.5 mg Per NG tube BID    mirtazapine  15 mg Oral Nightly    mycophenolate mofetil  500 mg Oral BID    pantoprazole  40 mg Intravenous BID    potassium chloride 10%  40 mEq Oral Once    sulfamethoxazole-trimethoprim 400-80mg  1 tablet Oral  Daily    tacrolimus  1 mg Sublingual BID    valganciclovir 50 mg/ml  450 mg Oral Daily     Continuous Infusions:  PRN Meds:acetaminophen, albuterol-ipratropium, aluminum & magnesium hydroxide-simethicone, artificial tears, bacitracin, bisacodyl, dextrose 50%, dextrose 50%, glucagon (human recombinant), glucose, glucose, heparin (porcine), ondansetron, tiZANidine, traMADol    Review of Systems   Constitutional: Positive for activity change and appetite change (decreased). Negative for fatigue and fever.   HENT: Negative for congestion, facial swelling, sore throat and voice change.    Eyes: Negative for pain, discharge and visual disturbance.   Respiratory: Negative for apnea, cough, choking, chest tightness, shortness of breath and wheezing.    Cardiovascular: Negative for chest pain, palpitations and leg swelling.   Gastrointestinal: Positive for nausea. Negative for abdominal distention, abdominal pain, constipation, diarrhea and vomiting.   Endocrine: Negative.    Genitourinary: Negative for difficulty urinating, dysuria, hematuria and urgency.   Musculoskeletal: Negative.  Negative for back pain, gait problem, neck pain and neck stiffness.   Skin: Positive for wound. Negative for color change and pallor.   Allergic/Immunologic: Positive for immunocompromised state.   Neurological: Positive for weakness. Negative for dizziness, seizures, light-headedness and headaches.   Psychiatric/Behavioral: Negative for behavioral problems, confusion, decreased concentration, dysphoric mood, hallucinations, sleep disturbance and suicidal ideas. The patient is not nervous/anxious.      Objective:     Vital Signs (Most Recent):  Temp: 98.3 °F (36.8 °C) (01/01/19 0730)  Pulse: 90 (01/01/19 0843)  Resp: (!) 24 (01/01/19 0843)  BP: (!) 140/90 (01/01/19 0843)  SpO2: 99 % (01/01/19 0843) Vital Signs (24h Range):  Temp:  [98 °F (36.7 °C)-98.3 °F (36.8 °C)] 98.3 °F (36.8 °C)  Pulse:  [84-90] 90  Resp:  [20-31] 24  SpO2:  [97 %-100  %] 99 %  BP: (126-143)/(85-93) 140/90     Weight: 67.9 kg (149 lb 11.1 oz)  Body mass index is 21.48 kg/m².    Intake/Output - Last 3 Shifts       12/30 0700 - 12/31 0659 12/31 0700 - 01/01 0659 01/01 0700 - 01/02 0659    P.O. 380 480 100    I.V. (mL/kg) 100 (1.4)      NG/ 3282 600    Total Intake(mL/kg) 1200 (17.1) 3762 (55.4) 700 (10.3)    Urine (mL/kg/hr) 675 (0.4) 425 (0.3)     Emesis/NG output 0      Other 0      Stool 0 0     Blood 0      Total Output 675 425     Net +525 +3337 +700           Urine Occurrence 0 x 0 x     Stool Occurrence 1 x 0 x     Emesis Occurrence 0 x            Physical Exam   Constitutional: He is oriented to person, place, and time. He appears well-developed. No distress.   Temporal and distal extremity muscle wasting noted.   HENT:   Head: Normocephalic and atraumatic.   Mouth/Throat: Oropharynx is clear and moist. No oropharyngeal exudate.   Eyes: EOM are normal.   Neck: Normal range of motion. Neck supple. No JVD present.   Cardiovascular: Normal rate, regular rhythm, normal heart sounds and intact distal pulses.   No murmur heard.  Pulmonary/Chest: Effort normal. No respiratory distress. He has decreased breath sounds in the right lower field and the left lower field. He has no wheezes. He exhibits no tenderness.   Abdominal: Soft. Bowel sounds are normal. He exhibits no distension. There is no tenderness.   Chevron inc clean, dry, intact with steri strips in place     Musculoskeletal: Normal range of motion. He exhibits no edema or tenderness.   Neurological: He is alert and oriented to person, place, and time. He has normal reflexes.   Skin: Skin is warm and dry. Capillary refill takes 2 to 3 seconds. He is not diaphoretic. No pallor.   Psychiatric: He has a normal mood and affect. His behavior is normal. Thought content normal. His affect is not labile. He is not slowed. Cognition and memory are not impaired.   Nursing note and vitals  "reviewed.      Laboratory:  Immunosuppressants         Stop Route Frequency     tacrolimus capsule 1 mg      -- SL 2 times daily     mycophenolate mofetil 200 mg/mL suspension 500 mg      -- Oral 2 times daily        CBC:   Recent Labs   Lab 01/01/19  0712   WBC 9.79   RBC 2.76*   HGB 7.9*   HCT 25.3*   *   MCV 92   MCH 28.6   MCHC 31.2*     CMP:   Recent Labs   Lab 01/01/19  0712   *   CALCIUM 9.3   ALBUMIN 2.3*   PROT 6.0      K 3.4*   CO2 27      BUN 60*   CREATININE 1.5*   ALKPHOS 187*   ALT 12   AST 13   BILITOT 0.9     Labs within the past 24 hours have been reviewed.    Diagnostic Results:  None    Assessment/Plan:     * S/P liver transplant    - LFTs now improving slowly.  T bili was 37.6 day of transplant.  - Drain placed during take back. Drain placed to intra-abdominal abscess on 11/22.   - Fluid from collection noted with enterococcus VRE completed Zyvox treatment.  - Routine 1 month Liver US 12/17 which showed elevated velocity of hepatic artery and low RIs.   - Vascular Surgery consulted. Recommend repeat US in 10-14 days (12/27-12/31) to reassess. Appreciate Vascular recs.  LFTs stable.  - Liver US 12/28 to reassess HAS revealed elevated but slightly improved main hepatic artery velocity and mildly improved RIs.   - Will repeat US in 2 weeks.        Other dysphagia    -pt reports difficulty swallowing pills  -SLP consult, appreciate recs  -switched PO meds that pt is unable to swallow to liquid/IV       Hypokalemia    -Continue to monitor and replace as needed     Acute kidney failure with lesion of tubular necrosis    - Cont Dialysis MWF.  - Nephrology following, appreciate recs.  - Monitor.     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       Hypomagnesemia    - Continue to monitor & replace as needed.     Severe malnutrition    - Dietician following. Severe temporal, clavicle muscle depletion noted. Severe subq fat loss  - " Nutrition has been poor over the past 24 hours.   - Discussed at length with patient and caregivers that if patient's nutrition status does not improve, will need supplemental nutrition via tube feeds or TPN.  - Appetite stimulant started 12/19.   - Caregivers to bring in outside food for patient.   - Continue calorie count.  - EGD Monday 12/24 as pt c/o poor appetite + burning in chest/esophagus and occasionally vomiting after eating x several weeks.   - EGD 12/24 unremarkable. jhon tube placed afterwards.   - TF started for nutrition 12/24/18. TF at goal rate, 50 ml/hr.  - Prealbumin 7 on 12/26.  -Started scheduled remeron 12/31.      Nausea    - Intermittent, worse over past 10 days,  Changed Pepcid to Protonix 12/9/18.  Appetite seems to be slowly improving.    - Encourage multiple, small meals and cont PRN anti-emetics.    - GI consulted.  EGD 12/24 w normal esophagus, erythematous mucosa in the antrum and nodular mucosa in the duodenal bulb w biopsies.  Path pending.  - JHON placed 12/24.  Pt tolerating tube feeds.  Denies nausea w TF.           Anxiety    - Restarted Lexapro as per psych recs, 12/13.   - Monitor.     Prolonged Q-T interval on ECG    -  ms on EKG 12/19.  -  on EKG 12/21.  - Monitor.       Alcohol use disorder, severe, in early remission, dependence    - Monitor.       Decreased appetite    - Continue appetite stimulant.  - GI consulted as pt c/o burning sensation in chest/esophagus + vomiting after eating, passed SLP eval, recommended GI f/u.  - Cont to encourage PO intake. Ordered additional supplements.   - EGD 12/24, unremarkable. jhon tube placed and TF started. Increased TF to 45 ml, new goal rate 50 ml/hr.  - Prealbumin 7 on 12/26.  - start remeron 12/31     Acute renal failure with acute tubular necrosis superimposed on stage 3 chronic kidney disease    - 2 weeks for DEL prior to transplant.  - Renal function slow to recover post-op.   - Nephrology consulted for management.    - Cont with HD as per nephrology recs.  Apprec recs.    - Lasix 160 mg challenge 11/28 w/ minimal output.    - HD MWF. Tolerated well 12/26.    - Held HD on 12/28.   - Strict I&Os.   - Albumin 25% x 3 doses 12/28.  -Renal function continuing to improve. Creatinine decreased to 1.5 from 1.8 and increasing UOP. Has not required HD since Wednesday 12/26. Continue to monitor.      Long-term use of immunosuppressant medication    - Cont with prograf. Draw prograf level daily and adjust dose as needed to maintain a therapeutic level.   - restarted MMF on 12/29       At risk for opportunistic infections    - Cont with bactrim and valcyte for protection against opportunistic infections.   - Cont Isavuconazonium for fungal prophylaxis.     Adrenal cortical steroids causing adverse effect in therapeutic use    - Monitor.       Prophylactic immunotherapy    - See long term immunosuppression.        Debility    - Pt remains significantly debilitated.   - PT/OT for strengthening.   - Currently will need SNF for placement and further rehabilitation.   - Pt remains severely debilitated and not strong enough for SNF at this time.    - SNF consult placed 12/17. However, denied for SNF again as not strong enough at this time.  - Rehab consulted.  We now have a contract w [facility] for rehab.  Rehab willing to accept patient when medically appropriate.    - Continue aggressive therapy.     Pleural effusion associated with hepatic disorder    - c/o increased pain w deep breath today to right side.  CXR showed increased opacity in the inferior hemothorax on the right side since 11/26/2018.  Pulse ox 97-99% on RA.  Cont to monitor.   - continues to have fluid to RLL.  WBC remains elevated.    - thoracentesis performed on 11/30. Fluid noted with 4k WBC, 93 SEGS. cx no growth to date.  Cefepime added for treatment.  - Chest CT showing right pleural effusion improved, left w increased pleural effusion.   - Per ID, completed treatment of  empyema w Cefepime thru 12/8.  - sats remain 97-99% on RA.  - CXR 12/20 showed left hemidiaphragmatic margin that is better delineated than on 12/19/2018, consistent with improved aeration in the left lower lobe. + no significant detrimental interval change in the appearance of the chest, with the current examination demonstrating a slightly improved inspiratory depth level.    - denies SOB.  Last CXR 12/22 w improvement.     Type 2 diabetes mellitus without complication    - Endocrine consulted for management. Appreciate recs.   - Hypoglycemia 12/10 requiring D50.    - Repeat cx 12/11 - no growth.  - Blood glucose readings well controlled on tube feeds.     Anemia of chronic disease    - Last liver US 11/27. Evolving peritransplant hematomas with anechoic fluid collection adjacent to the right hepatic lobe.  Small volume perihepatic free fluid.  - Chest/Abd/pelvis CT 12/4 - no fluid to be drainged per transplant surgeon.  No source of bleeding.    - Last transfused 12/26 w HD w appropriate response.  Monitor with daily labs.          VTE Risk Mitigation (From admission, onward)        Ordered     heparin (porcine) injection 5,000 Units  Every 8 hours      12/24/18 0742     heparin (porcine) injection 1,000 Units  As needed (PRN)      12/12/18 1731     IP VTE HIGH RISK PATIENT  Once      11/11/18 1208          The patients clinical status was discussed at multidisplinary rounds, involving transplant surgery, transplant medicine, pharmacy, nursing, nutrition, and social work    Discharge Planning:  No Patient Care Coordination Note on file.      Bev Son NP  Liver Transplant  [facility]

## 2019-01-01 NOTE — ASSESSMENT & PLAN NOTE
- Pt remains significantly debilitated.   - PT/OT for strengthening.   - Currently will need SNF for placement and further rehabilitation.   - Pt remains severely debilitated and not strong enough for SNF at this time.    - SNF consult placed 12/17. However, denied for SNF again as not strong enough at this time.  - Rehab consulted.  We now have a contract w [facility] for rehab.  Rehab willing to accept patient when medically appropriate.    - Continue aggressive therapy.

## 2019-01-01 NOTE — ASSESSMENT & PLAN NOTE
- Dietician following. Severe temporal, clavicle muscle depletion noted. Severe subq fat loss  - Nutrition has been poor over the past 24 hours.   - Discussed at length with patient and caregivers that if patient's nutrition status does not improve, will need supplemental nutrition via tube feeds or TPN.  - Appetite stimulant started 12/19.   - Caregivers to bring in outside food for patient.   - Continue calorie count.  - EGD Monday 12/24 as pt c/o poor appetite + burning in chest/esophagus and occasionally vomiting after eating x several weeks.   - EGD 12/24 unremarkable. prabhakar tube placed afterwards.   - TF started for nutrition 12/24/18. TF at goal rate, 50 ml/hr.  - Prealbumin 7 on 12/26.  -Started scheduled remeron 12/31.

## 2019-01-01 NOTE — PLAN OF CARE
Problem: Patient Care Overview  Goal: Plan of Care Review  Outcome: Outcome(s) achieved Date Met: 01/01/19  Patient aaox4. Up with 1-2 assistance and use of bedside commode. Ambulation encouraged.  Family at bedside, attentive to patient's needs. Regular diet; poor appetite, eating encouraged. JHON replaced due to clog; verified by xray, tubefeeds continued at 50ml/hr.  accuchecks ac/hs.  Complaint of sore/burning at base of throat when taking anything PO. Patient willing to take small medications PO but requesting larger ones be crushed and placed through JHON. Right arm midline, cdi, dressing changed, per NP okay to extend. HD cath dressing changed, cdi.  Complaints of back pain and discomfort, given prn medication and encouraged frequent turning. Standard precautions maintained.        2-3 wks

## 2019-01-01 NOTE — ASSESSMENT & PLAN NOTE
- Continue appetite stimulant.  - GI consulted as pt c/o burning sensation in chest/esophagus + vomiting after eating, passed SLP eval, recommended GI f/u.  - Cont to encourage PO intake. Ordered additional supplements.   - EGD 12/24, unremarkable. prabhakar tube placed and TF started. Increased TF to 45 ml, new goal rate 50 ml/hr.  - Prealbumin 7 on 12/26.  - start remeron 12/31

## 2019-01-01 NOTE — SUBJECTIVE & OBJECTIVE
Scheduled Meds:   aspirin  81 mg Oral Daily    epoetin sherlyn (PROCRIT) injection  50 Units/kg (Dosing Weight) Intravenous Every Mon, Wed, Fri    ergocalciferol  50,000 Units Oral Q7 Days    escitalopram oxalate  20 mg Oral Daily    fluconazole 40 mg/ml  200 mg Oral Daily    heparin (porcine)  5,000 Units Subcutaneous Q8H    lidocaine  1 patch Transdermal Q24H    metoprolol  12.5 mg Per NG tube BID    mirtazapine  15 mg Oral Nightly    mycophenolate mofetil  500 mg Oral BID    pantoprazole  40 mg Intravenous BID    potassium chloride 10%  40 mEq Oral Once    sulfamethoxazole-trimethoprim 400-80mg  1 tablet Oral Daily    tacrolimus  1 mg Sublingual BID    valganciclovir 50 mg/ml  450 mg Oral Daily     Continuous Infusions:  PRN Meds:acetaminophen, albuterol-ipratropium, aluminum & magnesium hydroxide-simethicone, artificial tears, bacitracin, bisacodyl, dextrose 50%, dextrose 50%, glucagon (human recombinant), glucose, glucose, heparin (porcine), ondansetron, tiZANidine, traMADol    Review of Systems   Constitutional: Positive for activity change and appetite change (decreased). Negative for fatigue and fever.   HENT: Negative for congestion, facial swelling, sore throat and voice change.    Eyes: Negative for pain, discharge and visual disturbance.   Respiratory: Negative for apnea, cough, choking, chest tightness, shortness of breath and wheezing.    Cardiovascular: Negative for chest pain, palpitations and leg swelling.   Gastrointestinal: Positive for nausea. Negative for abdominal distention, abdominal pain, constipation, diarrhea and vomiting.   Endocrine: Negative.    Genitourinary: Negative for difficulty urinating, dysuria, hematuria and urgency.   Musculoskeletal: Negative.  Negative for back pain, gait problem, neck pain and neck stiffness.   Skin: Positive for wound. Negative for color change and pallor.   Allergic/Immunologic: Positive for immunocompromised state.   Neurological: Positive  for weakness. Negative for dizziness, seizures, light-headedness and headaches.   Psychiatric/Behavioral: Negative for behavioral problems, confusion, decreased concentration, dysphoric mood, hallucinations, sleep disturbance and suicidal ideas. The patient is not nervous/anxious.      Objective:     Vital Signs (Most Recent):  Temp: 98.3 °F (36.8 °C) (01/01/19 0730)  Pulse: 90 (01/01/19 0843)  Resp: (!) 24 (01/01/19 0843)  BP: (!) 140/90 (01/01/19 0843)  SpO2: 99 % (01/01/19 0843) Vital Signs (24h Range):  Temp:  [98 °F (36.7 °C)-98.3 °F (36.8 °C)] 98.3 °F (36.8 °C)  Pulse:  [84-90] 90  Resp:  [20-31] 24  SpO2:  [97 %-100 %] 99 %  BP: (126-143)/(85-93) 140/90     Weight: 67.9 kg (149 lb 11.1 oz)  Body mass index is 21.48 kg/m².    Intake/Output - Last 3 Shifts       12/30 0700 - 12/31 0659 12/31 0700 - 01/01 0659 01/01 0700 - 01/02 0659    P.O. 380 480 100    I.V. (mL/kg) 100 (1.4)      NG/ 3282 600    Total Intake(mL/kg) 1200 (17.1) 3762 (55.4) 700 (10.3)    Urine (mL/kg/hr) 675 (0.4) 425 (0.3)     Emesis/NG output 0      Other 0      Stool 0 0     Blood 0      Total Output 675 425     Net +525 +3337 +700           Urine Occurrence 0 x 0 x     Stool Occurrence 1 x 0 x     Emesis Occurrence 0 x            Physical Exam   Constitutional: He is oriented to person, place, and time. He appears well-developed. No distress.   Temporal and distal extremity muscle wasting noted.   HENT:   Head: Normocephalic and atraumatic.   Mouth/Throat: Oropharynx is clear and moist. No oropharyngeal exudate.   Eyes: EOM are normal.   Neck: Normal range of motion. Neck supple. No JVD present.   Cardiovascular: Normal rate, regular rhythm, normal heart sounds and intact distal pulses.   No murmur heard.  Pulmonary/Chest: Effort normal. No respiratory distress. He has decreased breath sounds in the right lower field and the left lower field. He has no wheezes. He exhibits no tenderness.   Abdominal: Soft. Bowel sounds are normal. He  exhibits no distension. There is no tenderness.   Chevron inc clean, dry, intact with steri strips in place     Musculoskeletal: Normal range of motion. He exhibits no edema or tenderness.   Neurological: He is alert and oriented to person, place, and time. He has normal reflexes.   Skin: Skin is warm and dry. Capillary refill takes 2 to 3 seconds. He is not diaphoretic. No pallor.   Psychiatric: He has a normal mood and affect. His behavior is normal. Thought content normal. His affect is not labile. He is not slowed. Cognition and memory are not impaired.   Nursing note and vitals reviewed.      Laboratory:  Immunosuppressants         Stop Route Frequency     tacrolimus capsule 1 mg      -- SL 2 times daily     mycophenolate mofetil 200 mg/mL suspension 500 mg      -- Oral 2 times daily        CBC:   Recent Labs   Lab 01/01/19  0712   WBC 9.79   RBC 2.76*   HGB 7.9*   HCT 25.3*   *   MCV 92   MCH 28.6   MCHC 31.2*     CMP:   Recent Labs   Lab 01/01/19  0712   *   CALCIUM 9.3   ALBUMIN 2.3*   PROT 6.0      K 3.4*   CO2 27      BUN 60*   CREATININE 1.5*   ALKPHOS 187*   ALT 12   AST 13   BILITOT 0.9     Labs within the past 24 hours have been reviewed.    Diagnostic Results:  None

## 2019-01-01 NOTE — PLAN OF CARE
Problem: Patient Care Overview  Goal: Plan of Care Review  Outcome: Ongoing (interventions implemented as appropriate)  -AAOx4  -R perm cath in place for HD. HD currently on hold per nephrology.   -R DANIE ML saline locked. Dressing CDI.   -C/o dysphagia JHON tube in left nare @ 65cm with continuous TF @ 50ml/hr (goal). Cleared by speech and EGD unremarkable. No issues swallowing night meds.  -BG checks ac/hs. Night time . No SSI needed per order.   -PRN Zanaflex given for muscle spasms.   -Pt up with one person assist. Wears non slip socks when oob.   -Pt free from falls/injuries thus far this shift.   -Bed in lowest, locked position. Bed rails up x2. Call light and personal belongings within reach.   -Will continue to monitor.

## 2019-01-01 NOTE — PROGRESS NOTES
"Notified DANAY Crabtree Np of patient's complaint of "feeling like my chest is getting bigger." breathing has been elevated throughout day but is now worsening. /92, O2 98%, HR95, with RR 38. Plan for labs and continue to monitor.   "

## 2019-01-02 PROBLEM — R06.00 DYSPNEA: Status: ACTIVE | Noted: 2019-01-02

## 2019-01-02 LAB
ALBUMIN SERPL BCP-MCNC: 2.5 G/DL
ALLENS TEST: ABNORMAL
ALP SERPL-CCNC: 214 U/L
ALT SERPL W/O P-5'-P-CCNC: 14 U/L
ANION GAP SERPL CALC-SCNC: 11 MMOL/L
AST SERPL-CCNC: 16 U/L
BASOPHILS # BLD AUTO: 0.21 K/UL
BASOPHILS NFR BLD: 2 %
BILIRUB SERPL-MCNC: 1 MG/DL
BUN SERPL-MCNC: 63 MG/DL
CALCIUM SERPL-MCNC: 9.8 MG/DL
CHLORIDE SERPL-SCNC: 102 MMOL/L
CK MB SERPL-MCNC: 0.7 NG/ML
CK MB SERPL-RTO: ABNORMAL %
CK SERPL-CCNC: <7 U/L
CO2 SERPL-SCNC: 27 MMOL/L
CREAT SERPL-MCNC: 1.6 MG/DL
DIFFERENTIAL METHOD: ABNORMAL
EOSINOPHIL # BLD AUTO: 0.7 K/UL
EOSINOPHIL NFR BLD: 7 %
ERYTHROCYTE [DISTWIDTH] IN BLOOD BY AUTOMATED COUNT: 15.5 %
EST. GFR  (AFRICAN AMERICAN): 56 ML/MIN/1.73 M^2
EST. GFR  (NON AFRICAN AMERICAN): 48.5 ML/MIN/1.73 M^2
GLUCOSE SERPL-MCNC: 193 MG/DL
HCO3 UR-SCNC: 26.1 MMOL/L (ref 24–28)
HCT VFR BLD AUTO: 25.1 %
HGB BLD-MCNC: 7.6 G/DL
IMM GRANULOCYTES # BLD AUTO: 0.16 K/UL
IMM GRANULOCYTES NFR BLD AUTO: 1.5 %
LYMPHOCYTES # BLD AUTO: 1 K/UL
LYMPHOCYTES NFR BLD: 9.2 %
MAGNESIUM SERPL-MCNC: 1.5 MG/DL
MCH RBC QN AUTO: 27.6 PG
MCHC RBC AUTO-ENTMCNC: 30.3 G/DL
MCV RBC AUTO: 91 FL
MONOCYTES # BLD AUTO: 1 K/UL
MONOCYTES NFR BLD: 9.9 %
NEUTROPHILS # BLD AUTO: 7.3 K/UL
NEUTROPHILS NFR BLD: 70.4 %
NRBC BLD-RTO: 0 /100 WBC
PCO2 BLDA: 29.7 MMHG (ref 35–45)
PH SMN: 7.55 [PH] (ref 7.35–7.45)
PHOSPHATE SERPL-MCNC: 3.4 MG/DL
PLATELET # BLD AUTO: 404 K/UL
PMV BLD AUTO: 12.1 FL
PO2 BLDA: 118 MMHG (ref 80–100)
POC BE: 4 MMOL/L
POC SATURATED O2: 99 % (ref 95–100)
POC TCO2: 27 MMOL/L (ref 23–27)
POCT GLUCOSE: 176 MG/DL (ref 70–110)
POCT GLUCOSE: 207 MG/DL (ref 70–110)
POTASSIUM SERPL-SCNC: 3.9 MMOL/L
PROT SERPL-MCNC: 6.6 G/DL
RBC # BLD AUTO: 2.75 M/UL
SAMPLE: ABNORMAL
SITE: ABNORMAL
SODIUM SERPL-SCNC: 140 MMOL/L
TACROLIMUS BLD-MCNC: 5.5 NG/ML
TROPONIN I SERPL DL<=0.01 NG/ML-MCNC: 0.01 NG/ML
TSH SERPL DL<=0.005 MIU/L-ACNC: 3.41 UIU/ML
WBC # BLD AUTO: 10.43 K/UL

## 2019-01-02 PROCEDURE — 63600175 PHARM REV CODE 636 W HCPCS: Performed by: PHYSICIAN ASSISTANT

## 2019-01-02 PROCEDURE — 25000003 PHARM REV CODE 250: Performed by: NURSE PRACTITIONER

## 2019-01-02 PROCEDURE — 25000003 PHARM REV CODE 250: Performed by: STUDENT IN AN ORGANIZED HEALTH CARE EDUCATION/TRAINING PROGRAM

## 2019-01-02 PROCEDURE — 63600175 PHARM REV CODE 636 W HCPCS: Performed by: NURSE PRACTITIONER

## 2019-01-02 PROCEDURE — 84100 ASSAY OF PHOSPHORUS: CPT

## 2019-01-02 PROCEDURE — 85025 COMPLETE CBC W/AUTO DIFF WBC: CPT

## 2019-01-02 PROCEDURE — 93005 ELECTROCARDIOGRAM TRACING: CPT

## 2019-01-02 PROCEDURE — 99900035 HC TECH TIME PER 15 MIN (STAT)

## 2019-01-02 PROCEDURE — 83735 ASSAY OF MAGNESIUM: CPT

## 2019-01-02 PROCEDURE — 80053 COMPREHEN METABOLIC PANEL: CPT

## 2019-01-02 PROCEDURE — C9113 INJ PANTOPRAZOLE SODIUM, VIA: HCPCS | Performed by: NURSE PRACTITIONER

## 2019-01-02 PROCEDURE — 94664 DEMO&/EVAL PT USE INHALER: CPT

## 2019-01-02 PROCEDURE — 82803 BLOOD GASES ANY COMBINATION: CPT

## 2019-01-02 PROCEDURE — 27000646 HC AEROBIKA DEVICE

## 2019-01-02 PROCEDURE — 93010 ELECTROCARDIOGRAM REPORT: CPT | Mod: ,,, | Performed by: INTERNAL MEDICINE

## 2019-01-02 PROCEDURE — 80197 ASSAY OF TACROLIMUS: CPT

## 2019-01-02 PROCEDURE — 99233 PR SUBSEQUENT HOSPITAL CARE,LEVL III: ICD-10-PCS | Mod: 24,,, | Performed by: NURSE PRACTITIONER

## 2019-01-02 PROCEDURE — 25000003 PHARM REV CODE 250: Performed by: PHYSICIAN ASSISTANT

## 2019-01-02 PROCEDURE — 84484 ASSAY OF TROPONIN QUANT: CPT

## 2019-01-02 PROCEDURE — 20600001 HC STEP DOWN PRIVATE ROOM

## 2019-01-02 PROCEDURE — 84443 ASSAY THYROID STIM HORMONE: CPT

## 2019-01-02 PROCEDURE — 36415 COLL VENOUS BLD VENIPUNCTURE: CPT

## 2019-01-02 PROCEDURE — 99232 PR SUBSEQUENT HOSPITAL CARE,LEVL II: ICD-10-PCS | Mod: ,,, | Performed by: NURSE PRACTITIONER

## 2019-01-02 PROCEDURE — 99232 SBSQ HOSP IP/OBS MODERATE 35: CPT | Mod: ,,, | Performed by: NURSE PRACTITIONER

## 2019-01-02 PROCEDURE — 82553 CREATINE MB FRACTION: CPT

## 2019-01-02 PROCEDURE — 93010 EKG 12-LEAD: ICD-10-PCS | Mod: ,,, | Performed by: INTERNAL MEDICINE

## 2019-01-02 PROCEDURE — 36600 WITHDRAWAL OF ARTERIAL BLOOD: CPT

## 2019-01-02 PROCEDURE — 99233 SBSQ HOSP IP/OBS HIGH 50: CPT | Mod: 24,,, | Performed by: NURSE PRACTITIONER

## 2019-01-02 PROCEDURE — 63600175 PHARM REV CODE 636 W HCPCS: Mod: JG | Performed by: INTERNAL MEDICINE

## 2019-01-02 PROCEDURE — 82550 ASSAY OF CK (CPK): CPT

## 2019-01-02 RX ORDER — FUROSEMIDE 10 MG/ML
60 INJECTION INTRAMUSCULAR; INTRAVENOUS ONCE
Status: COMPLETED | OUTPATIENT
Start: 2019-01-02 | End: 2019-01-02

## 2019-01-02 RX ORDER — TACROLIMUS 1 MG/1
3 CAPSULE ORAL 2 TIMES DAILY
Status: DISCONTINUED | OUTPATIENT
Start: 2019-01-02 | End: 2019-01-03

## 2019-01-02 RX ADMIN — FUROSEMIDE 60 MG: 10 INJECTION, SOLUTION INTRAMUSCULAR; INTRAVENOUS at 11:01

## 2019-01-02 RX ADMIN — ERYTHROPOIETIN 4200 UNITS: 10000 INJECTION, SOLUTION INTRAVENOUS; SUBCUTANEOUS at 03:01

## 2019-01-02 RX ADMIN — SULFAMETHOXAZOLE AND TRIMETHOPRIM 1 TABLET: 400; 80 TABLET ORAL at 08:01

## 2019-01-02 RX ADMIN — INSULIN ASPART 1 UNITS: 100 INJECTION, SOLUTION INTRAVENOUS; SUBCUTANEOUS at 09:01

## 2019-01-02 RX ADMIN — TACROLIMUS 1 MG: 1 CAPSULE ORAL at 08:01

## 2019-01-02 RX ADMIN — PANTOPRAZOLE SODIUM 40 MG: 40 INJECTION, POWDER, LYOPHILIZED, FOR SOLUTION INTRAVENOUS at 08:01

## 2019-01-02 RX ADMIN — Medication 12.5 MG: at 08:01

## 2019-01-02 RX ADMIN — MIRTAZAPINE 15 MG: 15 TABLET, ORALLY DISINTEGRATING ORAL at 08:01

## 2019-01-02 RX ADMIN — TIZANIDINE 2 MG: 2 TABLET ORAL at 06:01

## 2019-01-02 RX ADMIN — ASPIRIN 81 MG CHEWABLE TABLET 81 MG: 81 TABLET CHEWABLE at 08:01

## 2019-01-02 RX ADMIN — Medication 500 MG: at 08:01

## 2019-01-02 RX ADMIN — TRAMADOL HYDROCHLORIDE 50 MG: 50 TABLET, FILM COATED ORAL at 04:01

## 2019-01-02 RX ADMIN — ESCITALOPRAM OXALATE 20 MG: 5 SOLUTION ORAL at 08:01

## 2019-01-02 RX ADMIN — MORPHINE 450 MG: 10 SOLUTION ORAL at 08:01

## 2019-01-02 RX ADMIN — LIDOCAINE 1 PATCH: 50 PATCH CUTANEOUS at 05:01

## 2019-01-02 RX ADMIN — TIZANIDINE 2 MG: 2 TABLET ORAL at 09:01

## 2019-01-02 RX ADMIN — FLUCONAZOLE 200 MG: 40 POWDER, FOR SUSPENSION ORAL at 08:01

## 2019-01-02 RX ADMIN — LIDOCAINE 1 PATCH: 50 PATCH CUTANEOUS at 06:01

## 2019-01-02 RX ADMIN — HEPARIN SODIUM 5000 UNITS: 5000 INJECTION, SOLUTION INTRAVENOUS; SUBCUTANEOUS at 05:01

## 2019-01-02 RX ADMIN — HEPARIN SODIUM 5000 UNITS: 5000 INJECTION, SOLUTION INTRAVENOUS; SUBCUTANEOUS at 02:01

## 2019-01-02 RX ADMIN — TACROLIMUS 3 MG: 1 CAPSULE ORAL at 06:01

## 2019-01-02 RX ADMIN — HEPARIN SODIUM 5000 UNITS: 5000 INJECTION, SOLUTION INTRAVENOUS; SUBCUTANEOUS at 08:01

## 2019-01-02 NOTE — ASSESSMENT & PLAN NOTE
- Continue appetite stimulant.  - GI consulted as pt c/o burning sensation in chest/esophagus + vomiting after eating, passed SLP eval, recommended GI f/u.  - Cont to encourage PO intake. Ordered additional supplements.   - EGD 12/24, unremarkable. prabhakar tube placed and TF started. Increased TF to 45 ml, new goal rate 50 ml/hr.  - Prealbumin 7 on 12/26. Repeat in AM (1/3/19).  - started remeron 12/31.

## 2019-01-02 NOTE — ASSESSMENT & PLAN NOTE
- Cont with prograf. Draw prograf level daily and adjust dose as needed to maintain a therapeutic level.   - restarted MMF on 12/29.

## 2019-01-02 NOTE — PROGRESS NOTES
[facility]  Liver Transplant  Progress Note    Patient Name: Femi Enciso  MRN: [id number]  Admission Date: 10/27/2018  Hospital Length of Stay: 67 days  Code Status: Full Code  Primary Care Provider: Primary Doctor No  Post-Operative Day: 52    ORGAN:   LIVER  Disease Etiology: Alcoholic Cirrhosis  Donor Type:    - Brain Death  CDC High Risk:   No  Donor CMV Status:   Donor CMV Status: Positive  Donor HBcAB:   Negative  Donor HCV Status:   Negative  Donor HBV JAVIER: Negative  Donor HCV JAVIER: Negative  Whole or Partial: Whole Liver  Biliary Anastomosis: End to End  Arterial Anatomy: Standard  Subjective:     History of Present Illness:  Femi Enciso is a 53yr old male with PMH of acute ETOH hepatitis and DEL/ATN evolved to ESRD requiring HD (not candidate for KTX). He is now s/p liver transplant (SM induction, DBD, CMV D+/R-). Transplant surgery noted severe collateralization, adrenal gland firmly adhered to liver. Bare area of adrenal gland required several stitches and packing to obtain fair hemostasis (EBL 15L). OR take back  for bleeding from R adrenal bed in AM (significant clot in retroperitoneum, posterior to R hepatic lobe, tract posterior to the R kidney containing significant clot, small bleeding area of retroperitoneal fat) then returned to surgery same day for hemorrhaging closed with wound vac with plans to return to OR 24-48 hours for closure (retroperitoneal /retrorenal/retrocolic hematoma on the right ). Raw retroperitoneal bleeding. Suture ligatures placed, argon beam cautery, evarest placed in retroperitoneum behind cava and right kidney. Significant oozing from upper right adrenal gland, not amenable to ligation, that portion of the adrenal gland was resected with cautery). OR take back  for washout and incisional closure, no significant bleeding or hematoma found. OR cultures   from body fluid grew enterococcus faecium VRE. ID was consulted,  started on  daptomycin for VRE treatment and cefepime/flagyl to cover for IA ty in bile. Patient with significant leukocytosis with peak on 11/22 at 48K prompting drainage of R subphrenic fluid collection, perc drain placed, culture grew VRE. Stroke code called 11/21 for lethargy and unresponsive, CT head without evidence of acute ischemic changes and CTA chest negative, vascular neurology was consulted recommended MRI brain, but patient's AMS improved shortly after event. Nephrology consulted for ESRD requiring HD. Patient overall improved on antibiotic regimen, leukocytosis and AMS improved. He was transferred to TSU on POD #15.     Hospital Course:  Pt c/o CP 12/21. EKG stable from prior. Troponin wnl. CP resolved 12/22. CXR 12/20 showed no detrimental change. Pt hypertensive prior to HD.  Dialysis tolerated well 12/21 with 1.5L taken off. Bps much improved after dialysis, but monitoring closely.    Interval History: No acute events overnight. Pt having difficulty breathing and intermittent chest pain that radiates to his left shoulder which began yesterday evening. Last HD 12/26/18. Pt received Lasix 60 mg IV x 1 yesterday. Cardiac enzymes negative and EKG with NSR. Pt has the same complaints today. Repeat EKG and cardiac enzymes negative. ABG unremarkable. Reviewed with staff. Pt 02 sats % on RA. Cr 1.6 from 1.4. Will repeat lasix 60 mg IV again today. Pt remains with minimal po intake. Continue remeron, started 12/31. GI re-consulted. Will perform gastric emptying study in AM. Plan to hold TFs at 0300. Pt needs aggressive PT daily. Will transfer to Rehab when medically appropriate and off tube feedings. Monitor.     Scheduled Meds:   aspirin  81 mg Oral Daily    epoetin sherlyn (PROCRIT) injection  50 Units/kg (Dosing Weight) Intravenous Every Mon, Wed, Fri    ergocalciferol  50,000 Units Oral Q7 Days    escitalopram oxalate  20 mg Oral Daily    fluconazole 40 mg/ml  200 mg Oral Daily    heparin (porcine)   5,000 Units Subcutaneous Q8H    lidocaine  1 patch Transdermal Q24H    metoprolol  12.5 mg Per NG tube BID    mirtazapine  15 mg Oral Nightly    mycophenolate mofetil  500 mg Oral BID    pantoprazole  40 mg Intravenous BID    sulfamethoxazole-trimethoprim 400-80mg  1 tablet Oral Daily    tacrolimus  3 mg Oral BID    valganciclovir 50 mg/ml  450 mg Oral Daily     Continuous Infusions:  PRN Meds:acetaminophen, albuterol-ipratropium, aluminum & magnesium hydroxide-simethicone, artificial tears, bacitracin, bisacodyl, dextrose 50%, dextrose 50%, glucagon (human recombinant), glucose, glucose, heparin (porcine), insulin aspart U-100, ondansetron, tiZANidine, traMADol    Review of Systems   Constitutional: Positive for activity change and appetite change (decreased). Negative for chills, fatigue and fever.   HENT: Negative for congestion, facial swelling, sore throat and voice change.    Eyes: Negative for pain, discharge and visual disturbance.   Respiratory: Negative for apnea, cough, choking, chest tightness, shortness of breath and wheezing.    Cardiovascular: Negative for chest pain, palpitations and leg swelling.   Gastrointestinal: Positive for nausea. Negative for abdominal distention, abdominal pain, constipation, diarrhea and vomiting.   Endocrine: Negative.    Genitourinary: Negative for difficulty urinating, dysuria, hematuria and urgency.   Musculoskeletal: Negative.  Negative for back pain, gait problem, neck pain and neck stiffness.   Skin: Positive for wound. Negative for color change and pallor.   Allergic/Immunologic: Positive for immunocompromised state.   Neurological: Positive for weakness. Negative for dizziness, seizures, light-headedness and headaches.   Hematological: Negative.    Psychiatric/Behavioral: Negative for behavioral problems, confusion, decreased concentration, dysphoric mood, hallucinations, sleep disturbance and suicidal ideas. The patient is not nervous/anxious.       Objective:     Vital Signs (Most Recent):  Temp: 97.6 °F (36.4 °C) (01/02/19 1134)  Pulse: 85 (01/02/19 1100)  Resp: (!) 36 (01/02/19 1100)  BP: (!) 150/89 (01/02/19 1100)  SpO2: 99 % (01/02/19 1100) Vital Signs (24h Range):  Temp:  [97.6 °F (36.4 °C)-98.6 °F (37 °C)] 97.6 °F (36.4 °C)  Pulse:  [] 85  Resp:  [16-40] 36  SpO2:  [97 %-100 %] 99 %  BP: (138-155)/(88-98) 150/89     Weight: 68.1 kg (150 lb 2.1 oz)  Body mass index is 21.54 kg/m².    Intake/Output - Last 3 Shifts       12/31 0700 - 01/01 0659 01/01 0700 - 01/02 0659 01/02 0700 - 01/03 0659    P.O. 480 430 150    I.V. (mL/kg)       NG/GT 3282 875     Total Intake(mL/kg) 3762 (55.4) 1305 (19.2) 150 (2.2)    Urine (mL/kg/hr) 425 (0.3) 1000 (0.6) 200 (0.4)    Emesis/NG output       Other       Stool 0 0     Blood       Total Output 425 1000 200    Net +3337 +305 -50           Urine Occurrence 0 x 1 x     Stool Occurrence 0 x 1 x           Physical Exam   Constitutional: He is oriented to person, place, and time. He appears well-developed. No distress.   Temporal and distal extremity muscle wasting noted.   HENT:   Head: Normocephalic and atraumatic.   Mouth/Throat: Oropharynx is clear and moist. No oropharyngeal exudate.   Eyes: EOM are normal.   Neck: Normal range of motion. Neck supple. No JVD present.   Cardiovascular: Normal rate, regular rhythm, normal heart sounds and intact distal pulses.   No murmur heard.  Pulmonary/Chest: Effort normal. No respiratory distress. He has decreased breath sounds in the right lower field and the left lower field. He has no wheezes. He exhibits no tenderness.   Abdominal: Soft. Bowel sounds are normal. He exhibits no distension. There is no tenderness.   Chevron inc clean, dry, intact with steri strips in place     Musculoskeletal: Normal range of motion. He exhibits no edema or tenderness.   Neurological: He is alert and oriented to person, place, and time. He has normal reflexes.   Skin: Skin is warm and dry.  Capillary refill takes 2 to 3 seconds. He is not diaphoretic. No pallor.   Psychiatric: He has a normal mood and affect. His behavior is normal. Thought content normal. His affect is not labile. He is not slowed. Cognition and memory are not impaired.   Nursing note and vitals reviewed.      Laboratory:  Immunosuppressants         Stop Route Frequency     tacrolimus capsule 3 mg      -- Oral 2 times daily     mycophenolate mofetil 200 mg/mL suspension 500 mg      -- Oral 2 times daily        CBC:   Recent Labs   Lab 01/02/19  0642   WBC 10.43   RBC 2.75*   HGB 7.6*   HCT 25.1*   *   MCV 91   MCH 27.6   MCHC 30.3*     CMP:   Recent Labs   Lab 01/02/19  0642   *   CALCIUM 9.8   ALBUMIN 2.5*   PROT 6.6      K 3.9   CO2 27      BUN 63*   CREATININE 1.6*   ALKPHOS 214*   ALT 14   AST 16   BILITOT 1.0     Labs within the past 24 hours have been reviewed.    Diagnostic Results:  I have personally reviewed all pertinent imaging studies.    Assessment/Plan:     * S/P liver transplant    - LFTs now improving slowly.  T bili was 37.6 day of transplant.  - Drain placed during take back. Drain placed to intra-abdominal abscess on 11/22.   - Fluid from collection noted with enterococcus VRE completed Zyvox treatment.  - Routine 1 month Liver US 12/17 which showed elevated velocity of hepatic artery and low RIs.   - Vascular Surgery consulted. Recommend repeat US in 10-14 days (12/27-12/31) to reassess. Appreciate Vascular recs.  LFTs stable.  - Liver US 12/28 to reassess HAS revealed elevated but slightly improved main hepatic artery velocity and mildly improved RIs.   - Will repeat US in 2 weeks (1/11/19).        Dyspnea    - pt with sob and chest discomfort 1/1/19.  - RA sats %.  - chest xray showed moderate edema.  - cardiac enzymes and ekg negative on 1/1 and 1/2.  - Lasix 60 mg IV given x 2 (last dose on 1/2).   - ABGs stable. Reviewed with staff.        Other dysphagia    - pt reports  "difficulty swallowing pills.  - SLP consult, appreciate recs.  - switched PO meds that pt is unable to swallow to liquid/IV.       Hypokalemia    - Continue to monitor and replace as needed.     Acute kidney failure with lesion of tubular necrosis    - Cont Dialysis MWF. On Hold for now as kidneys recovering.  - Last HD 12/26/18.  - Nephrology following, appreciate recs.  - Monitor.     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       Hypomagnesemia    - Continue to monitor & replace as needed.     Severe malnutrition    - Dietician following. Severe temporal, clavicle muscle depletion noted. Severe subq fat loss.  - Nutrition has been poor over the past 24 hours.   - Discussed at length with patient and caregivers that if patient's nutrition status does not improve, will need supplemental nutrition via tube feeds or TPN.  - Appetite stimulant started 12/19.   - Caregivers to bring in outside food for patient.   - Continue calorie count.  - EGD Monday 12/24 as pt c/o poor appetite + burning in chest/esophagus and occasionally vomiting after eating x several weeks.   - EGD 12/24 unremarkable. jhon tube placed afterwards.   - TF started for nutrition 12/24/18. TF at goal rate, 50 ml/hr.  - Prealbumin 7 on 12/26. Repeat in AM.   - Started scheduled remeron 12/31.      Non-intractable vomiting with nausea    - Intermittent, worse over past 10 days,  Changed Pepcid to Protonix 12/9/18.  Appetite seems to be slowly improving.    - Encourage multiple, small meals and cont PRN anti-emetics.    - GI consulted.  EGD 12/24 w normal esophagus, erythematous mucosa in the antrum and nodular mucosa in the duodenal bulb w biopsies.  Path pending.  - JHON placed 12/24.  Pt tolerating tube feeds.  Denies nausea w TF.    - Pt only tolerating very minimal po intake.  - GI re-consulted. Will assess with gastric emptying study in AM. Monitor.          Anxiety    - Restarted Lexapro as per " psych recs, 12/13.   - Monitor.     Prolonged Q-T interval on ECG    -  ms on EKG 12/19.  -  on EKG 12/21.  - Monitor.       Alcohol use disorder, severe, in early remission, dependence    - Monitor.       Decreased appetite    - Continue appetite stimulant.  - GI consulted as pt c/o burning sensation in chest/esophagus + vomiting after eating, passed SLP eval, recommended GI f/u.  - Cont to encourage PO intake. Ordered additional supplements.   - EGD 12/24, unremarkable. prabhakar tube placed and TF started. Increased TF to 45 ml, new goal rate 50 ml/hr.  - Prealbumin 7 on 12/26. Repeat in AM (1/3/19).  - started remeron 12/31.     Acute renal failure with acute tubular necrosis superimposed on stage 3 chronic kidney disease    - 2 weeks for DEL prior to transplant.  - Renal function slow to recover post-op.   - Nephrology consulted for management.   - Cont with HD as per nephrology recs.  Apprec recs.    - Lasix 160 mg challenge 11/28 w/ minimal output.    - HD MWF. Tolerated well 12/26.    - Held HD on 12/28.   - Strict I&Os.   - Albumin 25% x 3 doses 12/28.  - Renal function continuing to improve. Creatinine decreasing with increasing UOP.   - Has not required HD since Wednesday 12/26.      Long-term use of immunosuppressant medication    - Cont with prograf. Draw prograf level daily and adjust dose as needed to maintain a therapeutic level.   - restarted MMF on 12/29.       At risk for opportunistic infections    - Cont with bactrim and valcyte for protection against opportunistic infections.   - Cont Isavuconazonium for fungal prophylaxis.     Adrenal cortical steroids causing adverse effect in therapeutic use    - Monitor.       Prophylactic immunotherapy    - See long term immunosuppression.        Debility    - Pt remains significantly debilitated.   - PT/OT for strengthening.   - Currently will need SNF for placement and further rehabilitation.   - Pt remains severely debilitated and not strong  enough for SNF at this time.    - SNF consult placed 12/17. However, denied for SNF again as not strong enough at this time.  - Rehab consulted.  We now have a contract w [facility] for rehab.  Rehab willing to accept patient when medically appropriate.    - Continue aggressive therapy.     Pleural effusion associated with hepatic disorder    - c/o increased pain w deep breath today to right side.  CXR showed increased opacity in the inferior hemothorax on the right side since 11/26/2018.  Pulse ox 97-99% on RA.  Cont to monitor.   - continues to have fluid to RLL.  WBC remains elevated.    - thoracentesis performed on 11/30. Fluid noted with 4k WBC, 93 SEGS. cx no growth to date.  Cefepime added for treatment.  - Chest CT showing right pleural effusion improved, left w increased pleural effusion.   - Per ID, completed treatment of empyema w Cefepime thru 12/8.  - sats remain 97-99% on RA.  - CXR 12/20 showed left hemidiaphragmatic margin that is better delineated than on 12/19/2018, consistent with improved aeration in the left lower lobe. + no significant detrimental interval change in the appearance of the chest, with the current examination demonstrating a slightly improved inspiratory depth level.    - denies SOB.  Last CXR 12/22 w improvement.  - pt with dyspnea starting 1/1/19. RA sats %. Chest xray showed moderated edema.   - Lasix 60 mg IV on 1/1 and 1/2/19.      Type 2 diabetes mellitus without complication    - Endocrine consulted for management. Appreciate recs.   - Hypoglycemia 12/10 requiring D50.    - Repeat cx 12/11 - no growth.  - Blood glucose readings well controlled on tube feeds.     Anemia of chronic disease    - Last liver US 11/27. Evolving peritransplant hematomas with anechoic fluid collection adjacent to the right hepatic lobe.  Small volume perihepatic free fluid.  - Chest/Abd/pelvis CT 12/4 - no fluid to be drainged per transplant surgeon.  No source of bleeding.    - Last transfused  12/26 w HD w appropriate response.  Monitor with daily labs.          VTE Risk Mitigation (From admission, onward)        Ordered     heparin (porcine) injection 5,000 Units  Every 8 hours      12/24/18 0742     heparin (porcine) injection 1,000 Units  As needed (PRN)      12/12/18 1731     IP VTE HIGH RISK PATIENT  Once      11/11/18 1208          The patients clinical status was discussed at multidisplinary rounds, involving transplant surgery, transplant medicine, pharmacy, nursing, nutrition, and social work     Discharge Planning: not a candidate for dc at this time.       Pamela Shirley, ABAD  Liver Transplant  [facility]

## 2019-01-02 NOTE — ASSESSMENT & PLAN NOTE
- pt reports difficulty swallowing pills.  - SLP consult, appreciate recs.  - switched PO meds that pt is unable to swallow to liquid/IV.

## 2019-01-02 NOTE — PLAN OF CARE
Problem: Patient Care Overview  Goal: Plan of Care Review  Outcome: Ongoing (interventions implemented as appropriate)  Pt is AAOx4 in bed wearing non-skid footwear, bed in low/locked position and with call bell within reach. Pt reminded to use call bell to call for assistance, pt verbalizes understanding. Mother at bedside. Pt is afebrile at this time. Proper hand hygiene performed before and after pt care activities. Patient c/o SOB and is tachypneic although lung sounds are clear and patient remains  98-99% on room air. NP Hliary aware. Patient received dose of IV Lasix at end of day shift and urinated 450cc afterwards. Bedtime BG of 225, sliding scale coverage administered.

## 2019-01-02 NOTE — PROGRESS NOTES
Update:    VITALIY met with patient and patient's caregiver/mother, Ambrosio. Patient was observed lying in bed. Patient and caregiver presented as AAOx4. Patient reported that he has been having some chest pains for a couple of days. Patient stated that despite the recent chest pains, he is hopeful that he will be able to discharge soon. Patient reported that he is eager to go to rehab in order to continue his recovery process. SW provided supportive listening and validation to patient. The caregiver reported that she was coping adequately with the support of family. Patient and caregiver denies any current mental health difficulties. SW will continue to follow patient for resources, education, support and dc planning as appropriate. SW remains available.

## 2019-01-02 NOTE — PROGRESS NOTES
[facility]  Physical Medicine & Rehab  Progress Note    Patient Name: Femi Enciso  MRN: [id number]  Admission Date: 10/27/2018  Length of Stay: 67 days  Attending Physician: Femi Patel MD    Subjective:     Principal Problem:S/P liver transplant    Hospital Course:   11/21/2018: Evaluated by therapy.  Bed mobility MaxA.  Sit to stand ModA x 2 ppl.  Ambulated 1 step MaxA.  UBD/LBD/toileting MaxA. Grooming Julito.  12/19/2018: Participated with therapy.  Bed mobility Julito.  Sit to stand Min-ModA and transfers ModA x 2 ppl.  No gait 2/2 nausea and dizziness during steps for trx.  UBD Julito and feeding (I).   12/20/2018: Participated with OT.  Sit to stand ModA x 2 ppl. No gait 2/2 anxiety.  Grooming Mod (I).   12/21/2018: Participated with PT.  Bed mobility Julito.  Sit to stand and transfers modA.  Ambulated ~3 steps modA.   12/24/2018: Participated with therapy.  Bed mobility CGA.  Sit to stand Min-ModA.  Ambulated-limited steps Min-MaxA x 2.  UBD Julito.  12/26/18: No therapy 2/2 dialysis.  12/27/2018: Participated with therapy.  Bed mobility CGA.  Sit to stand and transfers Julito.  Ambulated 3 ft Julito.  Pt refused all oob activities and ADL training. Pt requested OT to teach pt how to perform BUE exercises w/ theraband.  12/31/2019: Participated with therapy.  Bed mobility SBA-Julito.  Sit to stand Julito x 2 ppl.  Ambulated 20 ft Julito & RW.  UBD Julito and LBD/grooming setupA.    Interval History 1/2/2019:  Patient is seen for follow-up rehab evaluation and recommendations: TFs in progress through prabhakar tube. Participating with therapy.     HPI, Past Medical, Family, and Social History remains the same as documented in the initial encounter.    Scheduled Medications:    aspirin  81 mg Oral Daily    epoetin sherlyn (PROCRIT) injection  50 Units/kg (Dosing Weight) Intravenous Every Mon, Wed, Fri    ergocalciferol  50,000 Units Oral Q7 Days    escitalopram oxalate  20 mg Oral Daily    fluconazole 40 mg/ml   200 mg Oral Daily    heparin (porcine)  5,000 Units Subcutaneous Q8H    lidocaine  1 patch Transdermal Q24H    metoprolol  12.5 mg Per NG tube BID    mirtazapine  15 mg Oral Nightly    mycophenolate mofetil  500 mg Oral BID    pantoprazole  40 mg Intravenous BID    sulfamethoxazole-trimethoprim 400-80mg  1 tablet Oral Daily    tacrolimus  1 mg Sublingual BID    valganciclovir 50 mg/ml  450 mg Oral Daily       Diagnostic Results: Labs: Reviewed    PRN Medications: acetaminophen, albuterol-ipratropium, aluminum & magnesium hydroxide-simethicone, artificial tears, bacitracin, bisacodyl, dextrose 50%, dextrose 50%, glucagon (human recombinant), glucose, glucose, heparin (porcine), insulin aspart U-100, ondansetron, tiZANidine, traMADol    Review of Systems   Constitutional: Positive for activity change, appetite change and fatigue (improving).   HENT: Positive for trouble swallowing. Negative for voice change.    Respiratory: Negative for cough and shortness of breath.    Cardiovascular: Negative for chest pain and palpitations.   Gastrointestinal: Negative for nausea and vomiting.   Musculoskeletal: Positive for gait problem. Negative for arthralgias.   Skin: Positive for wound (surgical). Negative for color change.   Neurological: Positive for dizziness and weakness.     Objective:     Vital Signs (Most Recent):  Temp: 97.6 °F (36.4 °C) (01/02/19 1134)  Pulse: 85 (01/02/19 1100)  Resp: (!) 36 (01/02/19 1100)  BP: (!) 150/89 (01/02/19 1100)  SpO2: 99 % (01/02/19 1100)    Vital Signs (24h Range):  Temp:  [97.6 °F (36.4 °C)-98.6 °F (37 °C)] 97.6 °F (36.4 °C)  Pulse:  [] 85  Resp:  [16-40] 36  SpO2:  [97 %-100 %] 99 %  BP: (138-155)/(88-98) 150/89     Physical Exam   Constitutional: He is oriented to person, place, and time. He appears well-developed.   Appears with more energy    HENT:   Head: Normocephalic and atraumatic.   Eyes: Right eye exhibits no discharge. Left eye exhibits no discharge.   Neck:  Neck supple.   Cardiovascular: Normal rate and intact distal pulses.   Pulmonary/Chest: Effort normal. No respiratory distress.   Abdominal: Soft. He exhibits no distension.   [place] tube in place   Musculoskeletal: He exhibits no edema or deformity.   RUE: 5/5.  LUE: 5/5.  RLE: 3/5.  LLE: 3/5.   Neurological: He is alert and oriented to person, place, and time. No sensory deficit.   Skin: Skin is warm and dry.   Psychiatric: He has a normal mood and affect. His behavior is normal.     Assessment/Plan:      * S/P liver transplant    -s/p liver trx on 11/11 for alcoholic cirrhosis       Other dysphagia    -EGD on 12/24 with antral and duodenal bulb bx      Impaired functional mobility and endurance    -2/2 deconditioning from liver trx     See hospital course for functional, cognitive/speech/language, and nutrition/swallow status.      Recommendations  -  Encourage mobility, OOB in chair at least 3 hours per day, and early ambulation as appropriate  -  PT/OT evaluate and treat  -  Pain management  -  Monitor for and prevent skin breakdown and pressure ulcers  · Early mobility, repositioning/weight shifting every 20-30 minutes when sitting, turn patient every 2 hours, proper mattress/overlay and chair cushioning, pressure relief/heel protector boots  -  DVT prophylaxis:  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  -repeat US in 2 weeks per LTS     Severe malnutrition    -see decreased appetite     Decreased appetite    -  Decreased PO intake  -  s/p EGD 12/24->antrum and  duodenal bulb bx  -  prabhakar tube placed afterwards-->TF started for nutrition  -  Appears more energetic with TFs  -  final nutrition plan pending        Acute renal failure with acute tubular necrosis superimposed on stage 3 chronic kidney disease    -Nephrology following   -RRT held at this time, last HD 12/26  -kidney function improving          Pleural effusion associated with hepatic disorder    -last CXR on 12/19 with small left pleural effusion may be present  -on RA  -completed IV abx     Recommend Inpatient Rehab.  IRF preference per patient/[facility].  Barriers to IRF admission:  Nutritional plan and insurance approval.         Mela Scanlon NP  Department of Physical Medicine & Rehab   [facility]

## 2019-01-02 NOTE — ASSESSMENT & PLAN NOTE
- Intermittent, worse over past 10 days,  Changed Pepcid to Protonix 12/9/18.  Appetite seems to be slowly improving.    - Encourage multiple, small meals and cont PRN anti-emetics.    - GI consulted.  EGD 12/24 w normal esophagus, erythematous mucosa in the antrum and nodular mucosa in the duodenal bulb w biopsies.  Path pending.  - JHON placed 12/24.  Pt tolerating tube feeds.  Denies nausea w TF.    - Pt only tolerating very minimal po intake.  - GI re-consulted. Will assess with gastric emptying study in AM. Monitor.

## 2019-01-02 NOTE — SUBJECTIVE & OBJECTIVE
Interval History 1/2/2019:  Patient is seen for follow-up rehab evaluation and recommendations: TFs in progress through prabhakar tube. Participating with therapy.     HPI, Past Medical, Family, and Social History remains the same as documented in the initial encounter.    Scheduled Medications:    aspirin  81 mg Oral Daily    epoetin sherlyn (PROCRIT) injection  50 Units/kg (Dosing Weight) Intravenous Every Mon, Wed, Fri    ergocalciferol  50,000 Units Oral Q7 Days    escitalopram oxalate  20 mg Oral Daily    fluconazole 40 mg/ml  200 mg Oral Daily    heparin (porcine)  5,000 Units Subcutaneous Q8H    lidocaine  1 patch Transdermal Q24H    metoprolol  12.5 mg Per NG tube BID    mirtazapine  15 mg Oral Nightly    mycophenolate mofetil  500 mg Oral BID    pantoprazole  40 mg Intravenous BID    sulfamethoxazole-trimethoprim 400-80mg  1 tablet Oral Daily    tacrolimus  1 mg Sublingual BID    valganciclovir 50 mg/ml  450 mg Oral Daily       Diagnostic Results: Labs: Reviewed    PRN Medications: acetaminophen, albuterol-ipratropium, aluminum & magnesium hydroxide-simethicone, artificial tears, bacitracin, bisacodyl, dextrose 50%, dextrose 50%, glucagon (human recombinant), glucose, glucose, heparin (porcine), insulin aspart U-100, ondansetron, tiZANidine, traMADol    Review of Systems   Constitutional: Positive for activity change, appetite change and fatigue (improving).   HENT: Positive for trouble swallowing. Negative for voice change.    Respiratory: Negative for cough and shortness of breath.    Cardiovascular: Negative for chest pain and palpitations.   Gastrointestinal: Negative for nausea and vomiting.   Musculoskeletal: Positive for gait problem. Negative for arthralgias.   Skin: Positive for wound (surgical). Negative for color change.   Neurological: Positive for dizziness and weakness.     Objective:     Vital Signs (Most Recent):  Temp: 97.6 °F (36.4 °C) (01/02/19 1134)  Pulse: 85 (01/02/19 1100)  Resp:  (!) 36 (01/02/19 1100)  BP: (!) 150/89 (01/02/19 1100)  SpO2: 99 % (01/02/19 1100)    Vital Signs (24h Range):  Temp:  [97.6 °F (36.4 °C)-98.6 °F (37 °C)] 97.6 °F (36.4 °C)  Pulse:  [] 85  Resp:  [16-40] 36  SpO2:  [97 %-100 %] 99 %  BP: (138-155)/(88-98) 150/89     Physical Exam   Constitutional: He is oriented to person, place, and time. He appears well-developed.   Appears with more energy    HENT:   Head: Normocephalic and atraumatic.   Eyes: Right eye exhibits no discharge. Left eye exhibits no discharge. No scleral icterus.   Neck: Neck supple.   Cardiovascular: Normal rate and intact distal pulses.   Pulmonary/Chest: Effort normal. No respiratory distress.   Abdominal: Soft. He exhibits no distension.   [place] tube in place   Musculoskeletal: He exhibits no edema or deformity.   RUE: 5/5.  LUE: 5/5.  RLE: 3/5.  LLE: 3/5.   Neurological: He is alert and oriented to person, place, and time. No sensory deficit.        Skin: Skin is warm and dry.   Psychiatric: He has a normal mood and affect. His behavior is normal.     NEUROLOGICAL EXAMINATION:     MENTAL STATUS   Oriented to person, place, and time.

## 2019-01-02 NOTE — ASSESSMENT & PLAN NOTE
- Cont Dialysis MWF. On Hold for now as kidneys recovering.  - Last HD 12/26/18.  - Nephrology following, appreciate recs.  - Monitor.

## 2019-01-02 NOTE — ASSESSMENT & PLAN NOTE
-Repeat US on 12/28 resulted as slight improvement, the main hepatic artery remains elevated and the resistive indices have minimally improved, findings suggestive of hepatic artery stenosis with new left perihepatic fluid collection small focal lesion within the right hepatic lobe, measuring 1.2 cm, compatible with a hepatic cyst versus fluid collection.  -repeat US in 2 weeks per LTS

## 2019-01-02 NOTE — ASSESSMENT & PLAN NOTE
- Pt remains significantly debilitated.   - PT/OT for strengthening.   - Currently will need SNF for placement and further rehabilitation.   - Pt remains severely debilitated and not strong enough for SNF at this time.    - SNF consult placed 12/17. However, denied for SNF again as not strong enough at this time.  - Rehab consulted.  We now have a contract w [facility] for rehab.  Rehab willing to accept patient when medically appropriate.    - Continue aggressive therapy.

## 2019-01-02 NOTE — ASSESSMENT & PLAN NOTE
-2/2 deconditioning from liver trx     See hospital course for functional, cognitive/speech/language, and nutrition/swallow status.      Recommendations  -  Encourage mobility, OOB in chair at least 3 hours per day, and early ambulation as appropriate  -  PT/OT evaluate and treat  -  Pain management  -  Monitor for and prevent skin breakdown and pressure ulcers  · Early mobility, repositioning/weight shifting every 20-30 minutes when sitting, turn patient every 2 hours, proper mattress/overlay and chair cushioning, pressure relief/heel protector boots  -  DVT prophylaxis:  [facility]  -  Reviewed discharge options (IP rehab, SNF, HH therapy, and OP therapy)

## 2019-01-02 NOTE — PLAN OF CARE
Problem: Patient Care Overview  Goal: Plan of Care Review  Outcome: Ongoing (interventions implemented as appropriate)  Recommendation/Intervention:   1. Excessive infusion rate, TF meeting 121% EEN and 109% EPN. Recommend decreasing TF rate to 40mL/hr to meet EEN/EPN.   2. Encourage increased PO intake and OS intake as tolerated.   3. Dietitian Following      NFPE 12/14/18  Malnutrition in the context of Chronic Illness/Injury     Related to (etiology):  Cirrhosis s/p OLTx 11/11 and poor appetite      Signs and Symptoms (as evidenced by):  Energy Intake: <75% of estimated energy requirement for 2 months  Body Fat Depletion: severe overall subcutaneous fat loss and depletion of orbital area, triceps as well as protruding patellas, ribs and acromion process  Muscle Mass Depletion: severe scapular, temporal, calf, quadricep muscle wasting   Weight Loss: 16.5% x 2 months      Nutrition Diagnosis Status:   Continues    Full assessment completed, see Dietitian Note 1/2/2019.

## 2019-01-02 NOTE — PROGRESS NOTES
" [facility]  Adult Nutrition  Progress Note     SUMMARY       Recommendations  Recommendation/Intervention:   1. Excessive infusion rate, TF meeting 121% EEN and 109% EPN. Recommend decreasing TF rate to 40mL/hr to meet EEN/EPN.   2. Encourage increased PO intake and OS intake as tolerated.   3. Dietitian Following    Patient with Severe Malnutrition in the context ofChronic Illness/Injury.    Goals: Patient to meet >85% of EEN/EPN  Nutrition Goal Status: goal met  Communication of RD Recs: (POC)    General Information Comments: TF running at 50mL/hr, tolerating well. Excessive infusion rate meeting >100% of EEN. Noted elevated glucose. No po intake, dislikes supplements. Staff and caregiver providing encouragement with meals. Complaint of sore/burning at base of throat when taking anything po. Appetite stimulant started 12/31/18. Last HD 12/26/18. NFPE completed 12/14/18. Patient with Severe Malnutrition in the context ofChronic Illness/Injury.    Nutrition Discharge Planning: Adequate nutrition    Reason for Assessment  Reason For Assessment: RD follow-up  Diagnosis: transplant/postoperative complications(s/p OLTx 11/11)  Relevant Medical History: ESLD 2/2 alcoholic cirrhosis, ESRD on HD, DM2  Interdisciplinary Rounds: attended    Nutrition Risk Screen  Nutrition Risk Screen: tube feeding or parenteral nutrition    Nutrition/Diet History  Patient Reported Diet/Restrictions/Preferences: low salt, general  Food Preferences: noted  Spiritual, Cultural Beliefs, Baptism Practices, Values that Affect Care: no  Food Allergies: NKFA  Factors Affecting Nutritional Intake: decreased appetite, behavioral (mealtime)    Anthropometrics  Temp: 98.6 °F (37 °C)  Height Method: Stated  Height: 5' 10" (177.8 cm)  Height (inches): 70 in  Weight Method: Bed Scale  Weight: 68.1 kg (150 lb 2.1 oz)  Weight (lb): 150.13 lb  Ideal Body Weight (IBW), Male: 166 lb  % Ideal Body Weight, Male (lb): 119.13 lb  BMI " (Calculated): 28.4  BMI Grade: 25 - 29.9 - overweight  Usual Body Weight (UBW), k.5 kg    Lab/Procedures/Meds  Labs: Reviewed    2019    K 3.9 2019     2019    CO2 27 2019    BUN 63 (H) 2019    CREATININE 1.6 (H) 2019    CALCIUM 9.8 2019    PHOS 3.4 2019    MG 1.5 (L) 2019    ESTGFRAFRICA 56.0 (A) 2019    EGFRNONAA 48.5 (A) 2019    ALBUMIN 2.5 (L) 2019      ALT 14 2019    AST 16 2019    ALKPHOS 214 (H) 2019     POCT Glucose   Date Value Ref Range Status   2019 225 (H) 70 - 110 mg/dL Final   2019 198 (H) 70 - 110 mg/dL Final   2018 161 (H) 70 - 110 mg/dL Final     Meds: Reviewed  Scheduled Meds:   aspirin  81 mg Oral Daily    epoetin sherlyn (PROCRIT) injection  50 Units/kg (Dosing Weight) Intravenous Every Mon, Wed, Fri    ergocalciferol  50,000 Units Oral Q7 Days    escitalopram oxalate  20 mg Oral Daily    fluconazole 40 mg/ml  200 mg Oral Daily    heparin (porcine)  5,000 Units Subcutaneous Q8H    lidocaine  1 patch Transdermal Q24H    metoprolol  12.5 mg Per NG tube BID    mirtazapine  15 mg Oral Nightly    mycophenolate mofetil  500 mg Oral BID    pantoprazole  40 mg Intravenous BID    sulfamethoxazole-trimethoprim 400-80mg  1 tablet Oral Daily    tacrolimus  1 mg Sublingual BID    valganciclovir 50 mg/ml  450 mg Oral Daily     Estimated/Assessed Needs  Weight Used For Calorie Calculations: 66.5 kg (146 lb 9.7 oz)  Energy Calorie Requirements (kcal): 1970(PAL 1.3)  Energy Need Method: [place]  Protein Requirements: 79-99(1.2-1.5 gm/kg)  Weight Used For Protein Calculations: 66.5 kg (146 lb 9.7 oz)  Fluid Requirements (mL): 1 mL/kcal or per MD  Estimated Fluid Requirement Method: RDA Method  RDA Method (mL):      Nutrition Prescription Ordered  Current Diet Order: Regular  Current Nutrition Support Formula Ordered: Novasource Renal  Current Nutrition Support Rate  Ordered: 50 (ml)  Current Nutrition Support Frequency Ordered: 50mL/hr x 24hrs  Oral Nutrition Supplement: Boost Plus, Boost Breeze    Evaluation of Received Nutrient/Fluid Intake in last 24h  Enteral Calories (kcal): 2400  Enteral Protein (gm): 109  Enteral (Free Water) Fluid (mL): 860  % Kcal Needs: 121  % Protein Needs: 109  I/O: +5.6L since 12/19/18  Energy Calories Required: exceeds needs  Protein Required: exceeds needs  Fluid Required: (-)  Comments: LBM 1/1/19  Tolerance: tolerating  % Intake of Estimated Energy Needs: Other: 100%, TF Exceeds Needs  % Meal Intake: Other: 0%    Nutrition Risk  Level of Risk/Frequency of Follow-up: high(2x week)     Assessment and Plan  NFPE 12/14/18  Malnutrition in the context of Chronic Illness/Injury     Related to (etiology):  Cirrhosis s/p OLTx 11/11 and poor appetite      Signs and Symptoms (as evidenced by):  Energy Intake: <75% of estimated energy requirement for 2 months  Body Fat Depletion: severe overall subcutaneous fat loss and depletion of orbital area, triceps as well as protruding patellas, ribs and acromion process  Muscle Mass Depletion: severe scapular, temporal, calf, quadricep muscle wasting   Weight Loss: 16.5% x 2 months      Nutrition Diagnosis Status:   Continues    Monitor and Evaluation  Food and Nutrient Intake: energy intake, food and beverage intake  Food and Nutrient Adminstration: diet order  Knowledge/Beliefs/Attitudes: food and nutrition knowledge/skill  Physical Activity and Function: factors affecting access to physical activity  Anthropometric Measurements: weight, weight change, body mass index  Biochemical Data, Medical Tests and Procedures: electrolyte and renal panel, gastrointestinal profile, glucose/endocrine profile, inflammatory profile, lipid profile  Nutrition-Focused Physical Findings: overall appearance, extremities, muscles and bones, head and eyes, skin     Nutrition Follow-Up  RD Follow-up?: Yes

## 2019-01-02 NOTE — ASSESSMENT & PLAN NOTE
- LFTs now improving slowly.  T bili was 37.6 day of transplant.  - Drain placed during take back. Drain placed to intra-abdominal abscess on 11/22.   - Fluid from collection noted with enterococcus VRE completed Zyvox treatment.  - Routine 1 month Liver US 12/17 which showed elevated velocity of hepatic artery and low RIs.   - Vascular Surgery consulted. Recommend repeat US in 10-14 days (12/27-12/31) to reassess. Appreciate Vascular recs.  LFTs stable.  - Liver US 12/28 to reassess HAS revealed elevated but slightly improved main hepatic artery velocity and mildly improved RIs.   - Will repeat US in 2 weeks (1/11/19).

## 2019-01-02 NOTE — ASSESSMENT & PLAN NOTE
- Dietician following. Severe temporal, clavicle muscle depletion noted. Severe subq fat loss.  - Nutrition has been poor over the past 24 hours.   - Discussed at length with patient and caregivers that if patient's nutrition status does not improve, will need supplemental nutrition via tube feeds or TPN.  - Appetite stimulant started 12/19.   - Caregivers to bring in outside food for patient.   - Continue calorie count.  - EGD Monday 12/24 as pt c/o poor appetite + burning in chest/esophagus and occasionally vomiting after eating x several weeks.   - EGD 12/24 unremarkable. prabhakar tube placed afterwards.   - TF started for nutrition 12/24/18. TF at goal rate, 50 ml/hr.  - Prealbumin 7 on 12/26. Repeat in AM.   - Started scheduled remeron 12/31.

## 2019-01-02 NOTE — ASSESSMENT & PLAN NOTE
- c/o increased pain w deep breath today to right side.  CXR showed increased opacity in the inferior hemothorax on the right side since 11/26/2018.  Pulse ox 97-99% on RA.  Cont to monitor.   - continues to have fluid to RLL.  WBC remains elevated.    - thoracentesis performed on 11/30. Fluid noted with 4k WBC, 93 SEGS. cx no growth to date.  Cefepime added for treatment.  - Chest CT showing right pleural effusion improved, left w increased pleural effusion.   - Per ID, completed treatment of empyema w Cefepime thru 12/8.  - sats remain 97-99% on RA.  - CXR 12/20 showed left hemidiaphragmatic margin that is better delineated than on 12/19/2018, consistent with improved aeration in the left lower lobe. + no significant detrimental interval change in the appearance of the chest, with the current examination demonstrating a slightly improved inspiratory depth level.    - denies SOB.  Last CXR 12/22 w improvement.  - pt with dyspnea starting 1/1/19. RA sats %. Chest xray showed moderated edema.   - Lasix 60 mg IV on 1/1 and 1/2/19.

## 2019-01-02 NOTE — ASSESSMENT & PLAN NOTE
- 2 weeks for DEL prior to transplant.  - Renal function slow to recover post-op.   - Nephrology consulted for management.   - Cont with HD as per nephrology recs.  Apprec recs.    - Lasix 160 mg challenge 11/28 w/ minimal output.    - HD MWF. Tolerated well 12/26.    - Held HD on 12/28.   - Strict I&Os.   - Albumin 25% x 3 doses 12/28.  - Renal function continuing to improve. Creatinine decreasing with increasing UOP.   - Has not required HD since Wednesday 12/26.

## 2019-01-03 LAB
ALBUMIN SERPL BCP-MCNC: 2.4 G/DL
ALP SERPL-CCNC: 192 U/L
ALT SERPL W/O P-5'-P-CCNC: 14 U/L
ANION GAP SERPL CALC-SCNC: 12 MMOL/L
AST SERPL-CCNC: 15 U/L
BASOPHILS # BLD AUTO: 0.23 K/UL
BASOPHILS NFR BLD: 2.8 %
BILIRUB SERPL-MCNC: 0.9 MG/DL
BUN SERPL-MCNC: 65 MG/DL
CALCIUM SERPL-MCNC: 9.5 MG/DL
CHLORIDE SERPL-SCNC: 102 MMOL/L
CO2 SERPL-SCNC: 27 MMOL/L
CREAT SERPL-MCNC: 1.4 MG/DL
DIFFERENTIAL METHOD: ABNORMAL
EOSINOPHIL # BLD AUTO: 0.7 K/UL
EOSINOPHIL NFR BLD: 8.7 %
ERYTHROCYTE [DISTWIDTH] IN BLOOD BY AUTOMATED COUNT: 15.5 %
EST. GFR  (AFRICAN AMERICAN): >60 ML/MIN/1.73 M^2
EST. GFR  (NON AFRICAN AMERICAN): 57 ML/MIN/1.73 M^2
GLUCOSE SERPL-MCNC: 147 MG/DL
HCT VFR BLD AUTO: 24.2 %
HGB BLD-MCNC: 7.3 G/DL
IMM GRANULOCYTES # BLD AUTO: 0.13 K/UL
IMM GRANULOCYTES NFR BLD AUTO: 1.6 %
LYMPHOCYTES # BLD AUTO: 1.2 K/UL
LYMPHOCYTES NFR BLD: 13.9 %
MAGNESIUM SERPL-MCNC: 1.3 MG/DL
MCH RBC QN AUTO: 27.3 PG
MCHC RBC AUTO-ENTMCNC: 30.2 G/DL
MCV RBC AUTO: 91 FL
MONOCYTES # BLD AUTO: 0.8 K/UL
MONOCYTES NFR BLD: 9.2 %
NEUTROPHILS # BLD AUTO: 5.3 K/UL
NEUTROPHILS NFR BLD: 63.8 %
NRBC BLD-RTO: 0 /100 WBC
PHOSPHATE SERPL-MCNC: 4 MG/DL
PLATELET # BLD AUTO: 396 K/UL
PMV BLD AUTO: 12 FL
POCT GLUCOSE: 196 MG/DL (ref 70–110)
POCT GLUCOSE: 213 MG/DL (ref 70–110)
POTASSIUM SERPL-SCNC: 3.7 MMOL/L
PREALB SERPL-MCNC: 9 MG/DL
PROT SERPL-MCNC: 6.5 G/DL
RBC # BLD AUTO: 2.67 M/UL
SODIUM SERPL-SCNC: 141 MMOL/L
TACROLIMUS BLD-MCNC: 7.5 NG/ML
WBC # BLD AUTO: 8.26 K/UL

## 2019-01-03 PROCEDURE — 63600175 PHARM REV CODE 636 W HCPCS: Performed by: NURSE PRACTITIONER

## 2019-01-03 PROCEDURE — 94761 N-INVAS EAR/PLS OXIMETRY MLT: CPT

## 2019-01-03 PROCEDURE — 36415 COLL VENOUS BLD VENIPUNCTURE: CPT

## 2019-01-03 PROCEDURE — 25000003 PHARM REV CODE 250: Performed by: PHYSICIAN ASSISTANT

## 2019-01-03 PROCEDURE — 83735 ASSAY OF MAGNESIUM: CPT

## 2019-01-03 PROCEDURE — 99900035 HC TECH TIME PER 15 MIN (STAT)

## 2019-01-03 PROCEDURE — 97116 GAIT TRAINING THERAPY: CPT

## 2019-01-03 PROCEDURE — 99233 PR SUBSEQUENT HOSPITAL CARE,LEVL III: ICD-10-PCS | Mod: 24,,, | Performed by: NURSE PRACTITIONER

## 2019-01-03 PROCEDURE — 25000003 PHARM REV CODE 250: Performed by: NURSE PRACTITIONER

## 2019-01-03 PROCEDURE — 25000003 PHARM REV CODE 250: Performed by: STUDENT IN AN ORGANIZED HEALTH CARE EDUCATION/TRAINING PROGRAM

## 2019-01-03 PROCEDURE — 94664 DEMO&/EVAL PT USE INHALER: CPT

## 2019-01-03 PROCEDURE — 80197 ASSAY OF TACROLIMUS: CPT

## 2019-01-03 PROCEDURE — 63600175 PHARM REV CODE 636 W HCPCS: Performed by: PHYSICIAN ASSISTANT

## 2019-01-03 PROCEDURE — 97535 SELF CARE MNGMENT TRAINING: CPT

## 2019-01-03 PROCEDURE — 99233 SBSQ HOSP IP/OBS HIGH 50: CPT | Mod: 24,,, | Performed by: NURSE PRACTITIONER

## 2019-01-03 PROCEDURE — C9113 INJ PANTOPRAZOLE SODIUM, VIA: HCPCS | Performed by: NURSE PRACTITIONER

## 2019-01-03 PROCEDURE — 85025 COMPLETE CBC W/AUTO DIFF WBC: CPT

## 2019-01-03 PROCEDURE — 20600001 HC STEP DOWN PRIVATE ROOM

## 2019-01-03 PROCEDURE — 84100 ASSAY OF PHOSPHORUS: CPT

## 2019-01-03 PROCEDURE — 84134 ASSAY OF PREALBUMIN: CPT

## 2019-01-03 PROCEDURE — 80053 COMPREHEN METABOLIC PANEL: CPT

## 2019-01-03 RX ORDER — MAGNESIUM SULFATE HEPTAHYDRATE 40 MG/ML
2 INJECTION, SOLUTION INTRAVENOUS
Status: COMPLETED | OUTPATIENT
Start: 2019-01-03 | End: 2019-01-03

## 2019-01-03 RX ORDER — TACROLIMUS 1 MG/1
2 CAPSULE ORAL 2 TIMES DAILY
Status: DISCONTINUED | OUTPATIENT
Start: 2019-01-03 | End: 2019-01-08 | Stop reason: HOSPADM

## 2019-01-03 RX ORDER — DRONABINOL 2.5 MG/1
2.5 CAPSULE ORAL 2 TIMES DAILY
Status: DISCONTINUED | OUTPATIENT
Start: 2019-01-03 | End: 2019-01-08 | Stop reason: HOSPADM

## 2019-01-03 RX ADMIN — ERGOCALCIFEROL 50000 UNITS: 1.25 CAPSULE ORAL at 10:01

## 2019-01-03 RX ADMIN — PANTOPRAZOLE SODIUM 40 MG: 40 INJECTION, POWDER, LYOPHILIZED, FOR SOLUTION INTRAVENOUS at 10:01

## 2019-01-03 RX ADMIN — TIZANIDINE 2 MG: 2 TABLET ORAL at 01:01

## 2019-01-03 RX ADMIN — TACROLIMUS 2 MG: 1 CAPSULE ORAL at 05:01

## 2019-01-03 RX ADMIN — HEPARIN SODIUM 5000 UNITS: 5000 INJECTION, SOLUTION INTRAVENOUS; SUBCUTANEOUS at 02:01

## 2019-01-03 RX ADMIN — DRONABINOL 2.5 MG: 2.5 CAPSULE ORAL at 08:01

## 2019-01-03 RX ADMIN — MAGNESIUM SULFATE IN WATER 2 G: 40 INJECTION, SOLUTION INTRAVENOUS at 11:01

## 2019-01-03 RX ADMIN — LIDOCAINE 1 PATCH: 50 PATCH CUTANEOUS at 04:01

## 2019-01-03 RX ADMIN — DRONABINOL 2.5 MG: 2.5 CAPSULE ORAL at 01:01

## 2019-01-03 RX ADMIN — ESCITALOPRAM OXALATE 20 MG: 5 SOLUTION ORAL at 09:01

## 2019-01-03 RX ADMIN — TACROLIMUS 2 MG: 1 CAPSULE ORAL at 07:01

## 2019-01-03 RX ADMIN — ASPIRIN 81 MG CHEWABLE TABLET 81 MG: 81 TABLET CHEWABLE at 09:01

## 2019-01-03 RX ADMIN — Medication 500 MG: at 08:01

## 2019-01-03 RX ADMIN — SULFAMETHOXAZOLE AND TRIMETHOPRIM 1 TABLET: 400; 80 TABLET ORAL at 10:01

## 2019-01-03 RX ADMIN — FLUCONAZOLE 200 MG: 40 POWDER, FOR SUSPENSION ORAL at 10:01

## 2019-01-03 RX ADMIN — HEPARIN SODIUM 5000 UNITS: 5000 INJECTION, SOLUTION INTRAVENOUS; SUBCUTANEOUS at 08:01

## 2019-01-03 RX ADMIN — HEPARIN SODIUM 5000 UNITS: 5000 INJECTION, SOLUTION INTRAVENOUS; SUBCUTANEOUS at 05:01

## 2019-01-03 RX ADMIN — Medication 12.5 MG: at 11:01

## 2019-01-03 RX ADMIN — TIZANIDINE 2 MG: 2 TABLET ORAL at 05:01

## 2019-01-03 RX ADMIN — INSULIN ASPART 1 UNITS: 100 INJECTION, SOLUTION INTRAVENOUS; SUBCUTANEOUS at 08:01

## 2019-01-03 RX ADMIN — TIZANIDINE 2 MG: 2 TABLET ORAL at 09:01

## 2019-01-03 RX ADMIN — ONDANSETRON 4 MG: 2 INJECTION INTRAMUSCULAR; INTRAVENOUS at 09:01

## 2019-01-03 RX ADMIN — Medication 12.5 MG: at 08:01

## 2019-01-03 RX ADMIN — PANTOPRAZOLE SODIUM 40 MG: 40 INJECTION, POWDER, LYOPHILIZED, FOR SOLUTION INTRAVENOUS at 08:01

## 2019-01-03 RX ADMIN — MAGNESIUM SULFATE IN WATER 2 G: 40 INJECTION, SOLUTION INTRAVENOUS at 09:01

## 2019-01-03 RX ADMIN — MORPHINE 450 MG: 10 SOLUTION ORAL at 11:01

## 2019-01-03 RX ADMIN — Medication 500 MG: at 09:01

## 2019-01-03 NOTE — ASSESSMENT & PLAN NOTE
- Continue appetite stimulant.  - GI consulted as pt c/o burning sensation in chest/esophagus + vomiting after eating, passed SLP eval, recommended GI f/u.  - Cont to encourage PO intake. Ordered additional supplements.   - EGD 12/24, unremarkable. prabhakar tube placed and TF started. Increased TF to 45 ml, new goal rate 50 ml/hr.  - Prealbumin 7 on 12/26. Repeat prealbumin 9 on 1/3.  - started remeron 12/31.   - dc remeron and start marinol 1/3/19.

## 2019-01-03 NOTE — ASSESSMENT & PLAN NOTE
- Cont Dialysis MWF. On Hold for now as kidneys recovering.  - Last HD 12/26/18.  - Good UOP.  - Nephrology following, appreciate recs.  - Monitor.

## 2019-01-03 NOTE — PLAN OF CARE
Problem: Physical Therapy Goal  Goal: Physical Therapy Goal  Goals to be met by: 2018     Patient will increase functional independence with mobility by performin. Supine to sit with Modified [place] -not met  2. Sit to stand transfer with [place] -not met  3. Bed to chair transfer with independence using no AD -not met  4. Gait  x150 feet with Supervision using LRAD. -not met  5. Lower extremity exercise program x20 reps per handout, with independence -not met           Outcome: Ongoing (interventions implemented as appropriate)  Progressing well towards goals    Percy Coleman DPT  1/3/2019

## 2019-01-03 NOTE — ASSESSMENT & PLAN NOTE
- Intermittent, worse over past 10 days,  Changed Pepcid to Protonix 12/9/18.  Appetite seems to be slowly improving.    - Encourage multiple, small meals and cont PRN anti-emetics.    - GI consulted.  EGD 12/24 w normal esophagus, erythematous mucosa in the antrum and nodular mucosa in the duodenal bulb w biopsies.  Path pending.  - JHON placed 12/24.  Pt tolerating tube feeds.  Denies nausea w TF.    - Pt only tolerating very minimal po intake.  - GI re-consulted.   - pt unable to tolerate food for gastric emptying study.   - plan for repeat EGD with small bowel follow through. Monitor.

## 2019-01-03 NOTE — PT/OT/SLP PROGRESS
Occupational Therapy   Treatment    Name: Femi Enciso  MRN: [id number]  Admitting Diagnosis:  S/P liver transplant  10 Days Post-Op    Recommendations:     Discharge Recommendations: nursing facility, skilled  Discharge Equipment Recommendations:  (tbd)  Barriers to discharge:  None    Subjective     Pain/Comfort:  · Pain Rating 1: 0/10  · Pain Rating Post-Intervention 1: 0/10    Objective:     Communicated with: RN prior to session.  Patient found with all lines intact, call button in reach and mother present and telemetry, NG tube, pulse ox (continuous) upon OT entry to room.    General Precautions: Standard, fall   Orthopedic Precautions:N/A   Braces: N/A     Occupational Performance:    Bed Mobility:    · Patient completed Scooting/Bridging with stand by assistance  · Patient completed Supine to Sit with stand by assistance     Functional Mobility/Transfers:  · Patient completed Sit <> Stand Transfer with contact guard assistance, minimum assistance and trials 1 & 2 at cga w/ RW; trial 3 at min a w/ RW.  with  rolling walker   · Patient completed Toilet Transfer Step Transfer technique with stand by assistance with  rolling walker  · Functional Mobility: Pt ambulated 10 ft, 20 ft, and 26 ft at cga w/ RW.     Activities of Daily Living:  · Grooming: stand by assistance oral hygiene and combing hair while standing at sink      [facility] 6 Click ADL: 19    Treatment & Education:  Pt provided w/ psychosocial  support throughout session in the form of education, encouragement, and motivation.   Pt educated on POC.     Patient left up in chair with all lines intact, call button in reach and mother present  Education:    Assessment:     Femi Enciso is a 53 y.o. male with a medical diagnosis of S/P liver transplant. Pt displayed fair tolerance for therapy on this date. Pt displayed global deconditioning requiring increased assist for ADLs and mobility at this time. Pt progressing towards goals. Pt would benefit from skilled OT  services to improve independence and overall occupational functioning.     He presents with the following performance deficits affecting function are weakness, impaired endurance, impaired self care skills, impaired functional mobilty, decreased lower extremity function.     Rehab Prognosis:  Good; patient would benefit from acute skilled OT services to address these deficits and reach maximum level of function.       Plan:     Patient to be seen 4 x/week to address the above listed problems via self-care/home management, therapeutic activities, therapeutic exercises  · Plan of Care Expires: 01/20/19  · Plan of Care Reviewed with: patient, mother    This Plan of care has been discussed with the patient who was involved in its development and understands and is in agreement with the identified goals and treatment plan    GOALS:   Multidisciplinary Problems     Occupational Therapy Goals        Problem: Occupational Therapy Goal    Goal Priority Disciplines Outcome Interventions   Occupational Therapy Goal     OT, PT/OT Ongoing (interventions implemented as appropriate)    Description:  Goals to be met by: 12/31/18 ( Goals revised: 12/18)    Patient will increase functional independence with ADLs by performing:    UE Dressing with Supervision.   LE Dressing with Minimal Assistance.( met with socks; continue with pants)  Grooming while standing at sink with Stand-by Assistance. Met 1/3/2018  Toileting from toilet with Moderate Assistance for hygiene and clothing management.   Toilet transfer to toilet with moderate assistance.  Upper extremity  theraband exercise program x  15 reps  with independence. Met 12/9                        Multidisciplinary Problems (Resolved)        Problem: Occupational Therapy Goal    Goal Priority Disciplines Outcome Interventions   Occupational Therapy Goal   (Resolved)     OT, PT/OT Resolved for Upgrade                    Time Tracking:     OT Date of Treatment: 01/03/19  OT Start Time:  1101  OT Stop Time: 1120  OT Total Time (min): 19 min    Billable Minutes:Self Care/Home Management 10 minutes    Tristan Spencer, OT  1/3/2019

## 2019-01-03 NOTE — PLAN OF CARE
Problem: Patient Care Overview  Goal: Plan of Care Review  Outcome: Ongoing (interventions implemented as appropriate)  Pt AAO. Mother at bedside. Glasses on.  Pt s/p liver transplant 52 days. Very deconditioned. [place] to nare - TF @50cc/hr - residual 30cc- flushed with 30cc water.  Meds liquid and admin thru tube- pt able to swallow prograf, zanaflex and tramadol  R Midline - clean, dry and intact- flushed.  R perm cath - dressing clean, dry and inatct. [place] with ss.  VSS- visi on. O2 days 99%- pt complained today of SOB- chest xray done this am - lasix 60mg IV given - urinated >900cc and stated relief.  Received zanaflex twice today and tramadol one.  Pt placed on tele for chest pain- SR.  Afebrile.  Tube feeds to be turned off at 3am for gastric emptying study at 9am. Pt did not eat today - Accu 193, 176- no coverage needed.  Mother at bedside.  - very attentive to pt. Pt in better spirits this afternoon

## 2019-01-03 NOTE — ASSESSMENT & PLAN NOTE
- c/o increased pain w deep breath today to right side.  CXR showed increased opacity in the inferior hemothorax on the right side since 11/26/2018.  Pulse ox 97-99% on RA.  Cont to monitor.   - continues to have fluid to RLL.  WBC remains elevated.    - thoracentesis performed on 11/30. Fluid noted with 4k WBC, 93 SEGS. cx no growth to date.  Cefepime added for treatment.  - Chest CT showing right pleural effusion improved, left w increased pleural effusion.   - Per ID, completed treatment of empyema w Cefepime thru 12/8.  - sats remain 97-99% on RA.  - CXR 12/20 showed left hemidiaphragmatic margin that is better delineated than on 12/19/2018, consistent with improved aeration in the left lower lobe. + no significant detrimental interval change in the appearance of the chest, with the current examination demonstrating a slightly improved inspiratory depth level.    - denies SOB.  Last CXR 12/22 w improvement.  - pt with dyspnea starting 1/1/19. RA sats %. Chest xray showed moderated edema.   - Lasix 60 mg IV on 1/1 and 1/2/19.   - now improved.

## 2019-01-03 NOTE — PLAN OF CARE
Problem: Occupational Therapy Goal  Goal: Occupational Therapy Goal  Goals to be met by: 12/31/18 ( Goals revised: 12/18)    Patient will increase functional independence with ADLs by performing:    UE Dressing with Supervision.   LE Dressing with Minimal Assistance.( met with socks; continue with pants)  Grooming while standing at sink with Stand-by Assistance. Met 1/3/2018  Toileting from toilet with Moderate Assistance for hygiene and clothing management.   Toilet transfer to toilet with moderate assistance.  Upper extremity  theraband exercise program x  15 reps  with independence. Met 12/9            Continue OT POC     Comments: Tristan Spencer OTR/L  1/3/2019

## 2019-01-03 NOTE — ASSESSMENT & PLAN NOTE
- Dietician following. Severe temporal, clavicle muscle depletion noted. Severe subq fat loss.  - Nutrition has been poor over the past 24 hours.   - Discussed at length with patient and caregivers that if patient's nutrition status does not improve, will need supplemental nutrition via tube feeds or TPN.  - Appetite stimulant started 12/19.   - Caregivers to bring in outside food for patient.   - Continue calorie count.  - EGD Monday 12/24 as pt c/o poor appetite + burning in chest/esophagus and occasionally vomiting after eating x several weeks.   - EGD 12/24 unremarkable. prabhakar tube placed afterwards.   - TF started for nutrition 12/24/18. TF at goal rate, 50 ml/hr.  - Prealbumin 7 on 12/26. Repeat 9 on 1/3.  - Started scheduled remeron 12/31.   - dc remeron and start marinol 1/3.

## 2019-01-03 NOTE — ASSESSMENT & PLAN NOTE
- Pt remains significantly debilitated.   - PT/OT for strengthening.   - Currently will need SNF for placement and further rehabilitation.   - Pt remains severely debilitated and not strong enough for SNF at this time.    - SNF consult placed 12/17. However, denied for SNF again as not strong enough at this time.  - Rehab consulted.  We now have a contract w [facility] for rehab.  Rehab willing to accept patient when medically appropriate.    - Continue aggressive therapy.

## 2019-01-03 NOTE — PLAN OF CARE
Problem: Patient Care Overview  Goal: Plan of Care Review  Outcome: Ongoing (interventions implemented as appropriate)  Pt AAO- glasses on. Mother at bedside- very attentive.  VSS. Pt to gastric emptying study this am but not able to finish test. TF to  prabhakar at 50cc/hr- tolerating well. Flushed with 20cc water.  Able to swallow small pills with water. Complaints of intermittment nasuea.   Complaints of neck pain and back pain- zanaflex given - stated did not need lidocaine patch at this time.  Worked with PT and walked with walker - walker ordered.  Chevron incision with ss.  Lfts WNL. NPo after mn for possible EGD and TF to be held at MN. Pt 2 person assist up to from bed and chair- fall risk. Pt aware of fall risk and to call for assiist- has no-skid socks on. Midline to R AC - CDI. Permcath to R chest wall- no HD needed at this time.--  Dressing CDI. Prealbumin 9.  Pt expresses frustration at not being able to eat and get to rehab.

## 2019-01-03 NOTE — PLAN OF CARE
Problem: Patient Care Overview  Goal: Plan of Care Review  Outcome: Ongoing (interventions implemented as appropriate)  Pt is AAOx4 in bed wearing non-skid footwear, bed in low/locked position and with call bell within reach. Pt reminded to use call bell to call for assistance, pt verbalizes understanding. Mother at bedside. Pt is afebrile at this time. Proper hand hygiene performed before and after pt care activities. Chiara BARNEY with steristrips. Patient scheduled for gastric emptying study this AM. Denies any pain or discomfort at this time.

## 2019-01-03 NOTE — PROGRESS NOTES
[facility]  Liver Transplant  Progress Note    Patient Name: Femi Enciso  MRN: [id number]  Admission Date: 10/27/2018  Hospital Length of Stay: 68 days  Code Status: Full Code  Primary Care Provider: Primary Doctor No  Post-Operative Day: 53    ORGAN:   LIVER  Disease Etiology: Alcoholic Cirrhosis  Donor Type:    - Brain Death  CDC High Risk:   No  Donor CMV Status:   Donor CMV Status: Positive  Donor HBcAB:   Negative  Donor HCV Status:   Negative  Donor HBV JAVIER: Negative  Donor HCV JAVIER: Negative  Whole or Partial: Whole Liver  Biliary Anastomosis: End to End  Arterial Anatomy: Standard  Subjective:     History of Present Illness:  Femi Enciso is a 53yr old male with PMH of acute ETOH hepatitis and DEL/ATN evolved to ESRD requiring HD (not candidate for KTX). He is now s/p liver transplant (SM induction, DBD, CMV D+/R-). Transplant surgery noted severe collateralization, adrenal gland firmly adhered to liver. Bare area of adrenal gland required several stitches and packing to obtain fair hemostasis (EBL 15L). OR take back  for bleeding from R adrenal bed in AM (significant clot in retroperitoneum, posterior to R hepatic lobe, tract posterior to the R kidney containing significant clot, small bleeding area of retroperitoneal fat) then returned to surgery same day for hemorrhaging closed with wound vac with plans to return to OR 24-48 hours for closure (retroperitoneal /retrorenal/retrocolic hematoma on the right ). Raw retroperitoneal bleeding. Suture ligatures placed, argon beam cautery, evarest placed in retroperitoneum behind cava and right kidney. Significant oozing from upper right adrenal gland, not amenable to ligation, that portion of the adrenal gland was resected with cautery). OR take back  for washout and incisional closure, no significant bleeding or hematoma found. OR cultures   from body fluid grew enterococcus faecium VRE. ID was consulted,  started on  daptomycin for VRE treatment and cefepime/flagyl to cover for IA ty in bile. Patient with significant leukocytosis with peak on 11/22 at 48K prompting drainage of R subphrenic fluid collection, perc drain placed, culture grew VRE. Stroke code called 11/21 for lethargy and unresponsive, CT head without evidence of acute ischemic changes and CTA chest negative, vascular neurology was consulted recommended MRI brain, but patient's AMS improved shortly after event. Nephrology consulted for ESRD requiring HD. Patient overall improved on antibiotic regimen, leukocytosis and AMS improved. He was transferred to TSU on POD #15.     Hospital Course:  Pt c/o CP 12/21. EKG stable from prior. Troponin wnl. CP resolved 12/22. CXR 12/20 showed no detrimental change. Pt hypertensive prior to HD.  Dialysis tolerated well 12/21 with 1.5L taken off. Bps much improved after dialysis, but monitoring closely.    Interval History: Gastric emptying study scheduled today but pt was unable to tolerate. He c/o dry heaving/gagging while trying to eat the eggs containing the isotope for the procedure. S/w GI. Will attempt to repeat upper GI with small bowel follow through before deciding on PEG placement. Continue TFs for now via prabhakar tube. Will hold TF at midnight for possible GI procedure in AM. Marinol started today. Dc remeron. SOB and chest pain improved with additional lasix dose yesterday. Respirations non-labored. 02 sats remain stable on RA, %. Cr 1.4. UOP 1325 ml/24 hrs. No HD since 12/26. Continue aggressive PT daily. Will transfer to Rehab when medically appropriate and off tube feedings. Monitor.     Scheduled Meds:   aspirin  81 mg Oral Daily    dronabinol  2.5 mg Oral BID    epoetin sherlyn (PROCRIT) injection  50 Units/kg (Dosing Weight) Intravenous Every Mon, Wed, Fri    ergocalciferol  50,000 Units Oral Q7 Days    escitalopram oxalate  20 mg Oral Daily    fluconazole 40 mg/ml  200 mg Oral Daily    heparin (porcine)   5,000 Units Subcutaneous Q8H    lidocaine  1 patch Transdermal Q24H    metoprolol  12.5 mg Per NG tube BID    mycophenolate mofetil  500 mg Oral BID    pantoprazole  40 mg Intravenous BID    sulfamethoxazole-trimethoprim 400-80mg  1 tablet Oral Daily    tacrolimus  2 mg Oral BID    valganciclovir 50 mg/ml  450 mg Oral Daily     Continuous Infusions:  PRN Meds:acetaminophen, albuterol-ipratropium, aluminum & magnesium hydroxide-simethicone, artificial tears, bacitracin, bisacodyl, dextrose 50%, dextrose 50%, glucagon (human recombinant), glucose, glucose, heparin (porcine), insulin aspart U-100, ondansetron, tiZANidine, traMADol    Review of Systems   Constitutional: Positive for activity change and appetite change (decreased). Negative for chills, fatigue and fever.   HENT: Negative for congestion, facial swelling, sore throat and voice change.    Eyes: Negative for pain, discharge and visual disturbance.   Respiratory: Negative for apnea, cough, choking, chest tightness, shortness of breath and wheezing.    Cardiovascular: Negative for chest pain, palpitations and leg swelling.   Gastrointestinal: Positive for nausea. Negative for abdominal distention, abdominal pain, constipation, diarrhea and vomiting.   Endocrine: Negative.    Genitourinary: Negative for difficulty urinating, dysuria, hematuria and urgency.   Musculoskeletal: Negative.  Negative for back pain, gait problem, neck pain and neck stiffness.   Skin: Positive for wound. Negative for color change and pallor.   Allergic/Immunologic: Positive for immunocompromised state.   Neurological: Positive for weakness. Negative for dizziness, seizures, light-headedness and headaches.   Hematological: Negative.    Psychiatric/Behavioral: Negative for behavioral problems, confusion, decreased concentration, dysphoric mood, hallucinations, sleep disturbance and suicidal ideas. The patient is not nervous/anxious.      Objective:     Vital Signs (Most  Recent):  Temp: 99.1 °F (37.3 °C) (01/02/19 1938)  Pulse: 96 (01/03/19 1143)  Resp: (!) 26 (01/03/19 1100)  BP: 129/87 (01/03/19 1100)  SpO2: 97 % (01/03/19 1100) Vital Signs (24h Range):  Temp:  [98.4 °F (36.9 °C)-99.1 °F (37.3 °C)] 99.1 °F (37.3 °C)  Pulse:  [85-96] 96  Resp:  [20-32] 26  SpO2:  [97 %-100 %] 97 %  BP: (129-159)/(86-94) 129/87     Weight: 69.1 kg (152 lb 5.4 oz)  Body mass index is 21.86 kg/m².    Intake/Output - Last 3 Shifts       01/01 0700 - 01/02 0659 01/02 0700 - 01/03 0659 01/03 0700 - 01/04 0659    P.O. 430 540     NG/GT 1050 950 30    Total Intake(mL/kg) 1480 (21.7) 1490 (21.6) 30 (0.4)    Urine (mL/kg/hr) 1000 (0.6) 1325 (0.8)     Stool 0      Total Output 1000 1325     Net +480 +165 +30           Urine Occurrence 1 x      Stool Occurrence 1 x            Physical Exam   Constitutional: He is oriented to person, place, and time. He appears well-developed. No distress.   Temporal and distal extremity muscle wasting noted.   HENT:   Head: Normocephalic and atraumatic.   Mouth/Throat: Oropharynx is clear and moist. No oropharyngeal exudate.   Eyes: EOM are normal.   Neck: Normal range of motion. Neck supple. No JVD present.   Cardiovascular: Normal rate, regular rhythm, normal heart sounds and intact distal pulses.   No murmur heard.  Pulmonary/Chest: Effort normal. No respiratory distress. He has decreased breath sounds in the right lower field and the left lower field. He has no wheezes. He exhibits no tenderness.   Abdominal: Soft. Bowel sounds are normal. He exhibits no distension. There is no tenderness.   Chevron inc clean, dry, intact with steri strips in place     Musculoskeletal: Normal range of motion. He exhibits no edema or tenderness.   Neurological: He is alert and oriented to person, place, and time. He has normal reflexes.   Skin: Skin is warm and dry. Capillary refill takes 2 to 3 seconds. He is not diaphoretic. No pallor.   Psychiatric: He has a normal mood and affect. His  behavior is normal. Thought content normal. His affect is not labile. He is not slowed. Cognition and memory are not impaired.   Nursing note and vitals reviewed.      Laboratory:  Immunosuppressants         Stop Route Frequency     tacrolimus capsule 2 mg      -- Oral 2 times daily     mycophenolate mofetil 200 mg/mL suspension 500 mg      -- Oral 2 times daily        CBC:   Recent Labs   Lab 01/03/19  0620   WBC 8.26   RBC 2.67*   HGB 7.3*   HCT 24.2*   *   MCV 91   MCH 27.3   MCHC 30.2*     CMP:   Recent Labs   Lab 01/03/19  0620   *   CALCIUM 9.5   ALBUMIN 2.4*   PROT 6.5      K 3.7   CO2 27      BUN 65*   CREATININE 1.4   ALKPHOS 192*   ALT 14   AST 15   BILITOT 0.9     Labs within the past 24 hours have been reviewed.    Diagnostic Results:  I have personally reviewed all pertinent imaging studies.    Assessment/Plan:     * S/P liver transplant    - LFTs now improving slowly.  T bili was 37.6 day of transplant.  - Drain placed during take back. Drain placed to intra-abdominal abscess on 11/22.   - Fluid from collection noted with enterococcus VRE completed Zyvox treatment.  - Routine 1 month Liver US 12/17 which showed elevated velocity of hepatic artery and low RIs.   - Vascular Surgery consulted. Recommend repeat US in 10-14 days (12/27-12/31) to reassess. Appreciate Vascular recs.  LFTs stable.  - Liver US 12/28 to reassess HAS revealed elevated but slightly improved main hepatic artery velocity and mildly improved RIs.   - Will repeat US in 2 weeks (1/11/19).        Dyspnea    - pt with sob and chest discomfort 1/1/19.  - RA sats %.  - chest xray showed moderate edema.  - cardiac enzymes and ekg negative on 1/1 and 1/2.  - Lasix 60 mg IV given x 2 (last dose on 1/2) with appropriate response.  - ABGs stable. Reviewed with staff.   - now improved.     Other dysphagia    - pt reports difficulty swallowing pills.  - SLP consult, appreciate recs.  - switched PO meds that pt is  "unable to swallow to liquid/IV.       Hypokalemia    - Continue to monitor and replace as needed.     Acute kidney failure with lesion of tubular necrosis    - Cont Dialysis MWF. On Hold for now as kidneys recovering.  - Last HD 12/26/18.  - Good UOP.  - Nephrology following, appreciate recs.  - Monitor.     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       Hypomagnesemia    - Mag 2 gm IV x 2 doses.      Severe malnutrition    - Dietician following. Severe temporal, clavicle muscle depletion noted. Severe subq fat loss.  - Nutrition has been poor over the past 24 hours.   - Discussed at length with patient and caregivers that if patient's nutrition status does not improve, will need supplemental nutrition via tube feeds or TPN.  - Appetite stimulant started 12/19.   - Caregivers to bring in outside food for patient.   - Continue calorie count.  - EGD Monday 12/24 as pt c/o poor appetite + burning in chest/esophagus and occasionally vomiting after eating x several weeks.   - EGD 12/24 unremarkable. jhon tube placed afterwards.   - TF started for nutrition 12/24/18. TF at goal rate, 50 ml/hr.  - Prealbumin 7 on 12/26. Repeat 9 on 1/3.  - Started scheduled remeron 12/31.   - dc remeron and start marinol 1/3.     Non-intractable vomiting with nausea    - Intermittent, worse over past 10 days,  Changed Pepcid to Protonix 12/9/18.  Appetite seems to be slowly improving.    - Encourage multiple, small meals and cont PRN anti-emetics.    - GI consulted.  EGD 12/24 w normal esophagus, erythematous mucosa in the antrum and nodular mucosa in the duodenal bulb w biopsies.  Path pending.  - JHON placed 12/24.  Pt tolerating tube feeds.  Denies nausea w TF.    - Pt only tolerating very minimal po intake.  - GI re-consulted.   - pt unable to tolerate food for gastric emptying study.   - plan for repeat EGD with small bowel follow through. Monitor.          Anxiety    - Restarted " Lexapro as per psych recs, 12/13.   - Monitor.     Prolonged Q-T interval on ECG    -  ms on EKG 12/19.  -  on EKG 12/21.  - Monitor.       Alcohol use disorder, severe, in early remission, dependence    - Monitor.       Decreased appetite    - Continue appetite stimulant.  - GI consulted as pt c/o burning sensation in chest/esophagus + vomiting after eating, passed SLP eval, recommended GI f/u.  - Cont to encourage PO intake. Ordered additional supplements.   - EGD 12/24, unremarkable. prabhakar tube placed and TF started. Increased TF to 45 ml, new goal rate 50 ml/hr.  - Prealbumin 7 on 12/26. Repeat prealbumin 9 on 1/3.  - started remeron 12/31.   - dc remeron and start marinol 1/3/19.     Acute renal failure with acute tubular necrosis superimposed on stage 3 chronic kidney disease    - 2 weeks for DEL prior to transplant.  - Renal function slow to recover post-op.   - Nephrology consulted for management.   - Cont with HD as per nephrology recs.  Apprec recs.    - Lasix 160 mg challenge 11/28 w/ minimal output.    - HD MWF. Tolerated well 12/26.    - Held HD on 12/28.   - Strict I&Os.   - Albumin 25% x 3 doses 12/28.  - Renal function continuing to improve. Creatinine decreasing with increasing UOP.   - Has not required HD since Wednesday 12/26.      Long-term use of immunosuppressant medication    - Cont with prograf. Draw prograf level daily and adjust dose as needed to maintain a therapeutic level.   - restarted MMF on 12/29.       At risk for opportunistic infections    - Cont with bactrim and valcyte for protection against opportunistic infections.   - Cont fluconazole for fungal prophylaxis.     Adrenal cortical steroids causing adverse effect in therapeutic use    - Monitor.       Prophylactic immunotherapy    - See long term immunosuppression.        Weakness    - Pt remains significantly debilitated.   - PT/OT for strengthening.   - Currently will need SNF for placement and further  rehabilitation.   - Pt remains severely debilitated and not strong enough for SNF at this time.    - SNF consult placed 12/17. However, denied for SNF again as not strong enough at this time.  - Rehab consulted.  We now have a contract w [facility] for rehab.  Rehab willing to accept patient when medically appropriate.    - Continue aggressive therapy.     Pleural effusion associated with hepatic disorder    - c/o increased pain w deep breath today to right side.  CXR showed increased opacity in the inferior hemothorax on the right side since 11/26/2018.  Pulse ox 97-99% on RA.  Cont to monitor.   - continues to have fluid to RLL.  WBC remains elevated.    - thoracentesis performed on 11/30. Fluid noted with 4k WBC, 93 SEGS. cx no growth to date.  Cefepime added for treatment.  - Chest CT showing right pleural effusion improved, left w increased pleural effusion.   - Per ID, completed treatment of empyema w Cefepime thru 12/8.  - sats remain 97-99% on RA.  - CXR 12/20 showed left hemidiaphragmatic margin that is better delineated than on 12/19/2018, consistent with improved aeration in the left lower lobe. + no significant detrimental interval change in the appearance of the chest, with the current examination demonstrating a slightly improved inspiratory depth level.    - denies SOB.  Last CXR 12/22 w improvement.  - pt with dyspnea starting 1/1/19. RA sats %. Chest xray showed moderated edema.   - Lasix 60 mg IV on 1/1 and 1/2/19.   - now improved.     Type 2 diabetes mellitus without complication    - Endocrine consulted for management. Appreciate recs.   - Hypoglycemia 12/10 requiring D50.    - Repeat cx 12/11 - no growth.  - Blood glucose readings well controlled on tube feeds.     Anemia of chronic disease    - Last liver US 11/27. Evolving peritransplant hematomas with anechoic fluid collection adjacent to the right hepatic lobe.  Small volume perihepatic free fluid.  - Chest/Abd/pelvis CT 12/4 - no fluid to  be drainged per transplant surgeon.  No source of bleeding.    - Last transfused 12/26 w HD w appropriate response.  Monitor with daily labs.          VTE Risk Mitigation (From admission, onward)        Ordered     heparin (porcine) injection 5,000 Units  Every 8 hours      12/24/18 0742     heparin (porcine) injection 1,000 Units  As needed (PRN)      12/12/18 1731     IP VTE HIGH RISK PATIENT  Once      11/11/18 1208          The patients clinical status was discussed at multidisplinary rounds, involving transplant surgery, transplant medicine, pharmacy, nursing, nutrition, and social work    Discharge Planning: not a candidate for dc at this time.       Pamela Shirley, ABAD  Liver Transplant  [facility]

## 2019-01-03 NOTE — SUBJECTIVE & OBJECTIVE
Scheduled Meds:   aspirin  81 mg Oral Daily    dronabinol  2.5 mg Oral BID    epoetin sherlyn (PROCRIT) injection  50 Units/kg (Dosing Weight) Intravenous Every Mon, Wed, Fri    ergocalciferol  50,000 Units Oral Q7 Days    escitalopram oxalate  20 mg Oral Daily    fluconazole 40 mg/ml  200 mg Oral Daily    heparin (porcine)  5,000 Units Subcutaneous Q8H    lidocaine  1 patch Transdermal Q24H    metoprolol  12.5 mg Per NG tube BID    mycophenolate mofetil  500 mg Oral BID    pantoprazole  40 mg Intravenous BID    sulfamethoxazole-trimethoprim 400-80mg  1 tablet Oral Daily    tacrolimus  2 mg Oral BID    valganciclovir 50 mg/ml  450 mg Oral Daily     Continuous Infusions:  PRN Meds:acetaminophen, albuterol-ipratropium, aluminum & magnesium hydroxide-simethicone, artificial tears, bacitracin, bisacodyl, dextrose 50%, dextrose 50%, glucagon (human recombinant), glucose, glucose, heparin (porcine), insulin aspart U-100, ondansetron, tiZANidine, traMADol    Review of Systems   Constitutional: Positive for activity change and appetite change (decreased). Negative for chills, fatigue and fever.   HENT: Negative for congestion, facial swelling, sore throat and voice change.    Eyes: Negative for pain, discharge and visual disturbance.   Respiratory: Negative for apnea, cough, choking, chest tightness, shortness of breath and wheezing.    Cardiovascular: Negative for chest pain, palpitations and leg swelling.   Gastrointestinal: Positive for nausea. Negative for abdominal distention, abdominal pain, constipation, diarrhea and vomiting.   Endocrine: Negative.    Genitourinary: Negative for difficulty urinating, dysuria, hematuria and urgency.   Musculoskeletal: Negative.  Negative for back pain, gait problem, neck pain and neck stiffness.   Skin: Positive for wound. Negative for color change and pallor.   Allergic/Immunologic: Positive for immunocompromised state.   Neurological: Positive for weakness. Negative for  dizziness, seizures, light-headedness and headaches.   Hematological: Negative.    Psychiatric/Behavioral: Negative for behavioral problems, confusion, decreased concentration, dysphoric mood, hallucinations, sleep disturbance and suicidal ideas. The patient is not nervous/anxious.      Objective:     Vital Signs (Most Recent):  Temp: 99.1 °F (37.3 °C) (01/02/19 1938)  Pulse: 96 (01/03/19 1143)  Resp: (!) 26 (01/03/19 1100)  BP: 129/87 (01/03/19 1100)  SpO2: 97 % (01/03/19 1100) Vital Signs (24h Range):  Temp:  [98.4 °F (36.9 °C)-99.1 °F (37.3 °C)] 99.1 °F (37.3 °C)  Pulse:  [85-96] 96  Resp:  [20-32] 26  SpO2:  [97 %-100 %] 97 %  BP: (129-159)/(86-94) 129/87     Weight: 69.1 kg (152 lb 5.4 oz)  Body mass index is 21.86 kg/m².    Intake/Output - Last 3 Shifts       01/01 0700 - 01/02 0659 01/02 0700 - 01/03 0659 01/03 0700 - 01/04 0659    P.O. 430 540     NG/GT 1050 950 30    Total Intake(mL/kg) 1480 (21.7) 1490 (21.6) 30 (0.4)    Urine (mL/kg/hr) 1000 (0.6) 1325 (0.8)     Stool 0      Total Output 1000 1325     Net +480 +165 +30           Urine Occurrence 1 x      Stool Occurrence 1 x            Physical Exam   Constitutional: He is oriented to person, place, and time. He appears well-developed. No distress.   Temporal and distal extremity muscle wasting noted.   HENT:   Head: Normocephalic and atraumatic.   Mouth/Throat: Oropharynx is clear and moist. No oropharyngeal exudate.   Eyes: EOM are normal.   Neck: Normal range of motion. Neck supple. No JVD present.   Cardiovascular: Normal rate, regular rhythm, normal heart sounds and intact distal pulses.   No murmur heard.  Pulmonary/Chest: Effort normal. No respiratory distress. He has decreased breath sounds in the right lower field and the left lower field. He has no wheezes. He exhibits no tenderness.   Abdominal: Soft. Bowel sounds are normal. He exhibits no distension. There is no tenderness.   Chevron inc clean, dry, intact with steri strips in place      Musculoskeletal: Normal range of motion. He exhibits no edema or tenderness.   Neurological: He is alert and oriented to person, place, and time. He has normal reflexes.   Skin: Skin is warm and dry. Capillary refill takes 2 to 3 seconds. He is not diaphoretic. No pallor.   Psychiatric: He has a normal mood and affect. His behavior is normal. Thought content normal. His affect is not labile. He is not slowed. Cognition and memory are not impaired.   Nursing note and vitals reviewed.      Laboratory:  Immunosuppressants         Stop Route Frequency     tacrolimus capsule 2 mg      -- Oral 2 times daily     mycophenolate mofetil 200 mg/mL suspension 500 mg      -- Oral 2 times daily        CBC:   Recent Labs   Lab 01/03/19  0620   WBC 8.26   RBC 2.67*   HGB 7.3*   HCT 24.2*   *   MCV 91   MCH 27.3   MCHC 30.2*     CMP:   Recent Labs   Lab 01/03/19  0620   *   CALCIUM 9.5   ALBUMIN 2.4*   PROT 6.5      K 3.7   CO2 27      BUN 65*   CREATININE 1.4   ALKPHOS 192*   ALT 14   AST 15   BILITOT 0.9     Labs within the past 24 hours have been reviewed.    Diagnostic Results:  I have personally reviewed all pertinent imaging studies.

## 2019-01-03 NOTE — PT/OT/SLP PROGRESS
"Physical Therapy Treatment    Patient Name:  Femi Enciso   MRN:  [id number]    Recommendations:     Discharge Recommendations:  nursing facility, skilled   Discharge Equipment Recommendations: (TBD)   Barriers to discharge: Inaccessible home and Decreased caregiver support    Assessment:     Femi Enciso is a 53 y.o. male admitted with a medical diagnosis of S/P liver transplant.  He presents with the following impairments/functional limitations:  weakness, impaired functional mobilty, gait instability, impaired endurance, impaired self care skills, decreased lower extremity function, decreased upper extremity function, impaired balance  Pt tolerated session c c/o BLE weakness.  Performed bed mobility c CGA, transfer c min A and gait c CGA using RW.  Pt demo improved functional mobility - increased gait distance.  Ambulated short household distance and demo decreased gait speed, FFP, flat foot contact, mild unsteadiness, mod c/o BLE weakness, and c/o "legs going to give out."  Pt demo more motivation for participating c therapy but continues to require encouragement for once fatigued.  Pt safe to ambulated short distance in room c RW and 1x assistance.  Pt would benefit from continued skilled acute PT 4x/wk to improve functional mobility..    Rehab Prognosis: Good; patient would benefit from acute skilled PT services to address these deficits and reach maximum level of function.    Recent Surgery: Procedure(s) (LRB):  EGD (ESOPHAGOGASTRODUODENOSCOPY) (N/A) 10 Days Post-Op    Plan:     During this hospitalization, patient to be seen 4 x/week to address the identified rehab impairments via gait training, therapeutic activities, therapeutic exercises, neuromuscular re-education and progress toward the following goals:    · Plan of Care Expires:  01/17/19    Subjective     Chief Complaint: fatigue; BLE weakness  Patient/Family Comments/goals: "I'm going to need help here. This part of my leg (quad) is so " "weak."  Pain/Comfort:  · Pain Rating 1: 0/10      Objective:     Communicated with RN and OT prior to session.  Patient found HOB elevated telemetry, pulse ox (continuous), NG tube, peripheral IV  upon PT entry to room.     General Precautions: Standard, fall   Orthopedic Precautions:N/A   Braces: N/A     Functional Mobility:  · Bed Mobility:     · Rolling Right: contact guard assistance  · Scooting: contact guard assistance  · Supine to Sit: contact guard assistance  · Transfers:     · Sit to Stand:  contact guard assistance with rolling walker 1x from bed  · CGA 1x from BSC c RW  · Min A 1x from bedside chair c RW  · Gait: 10ft, 20ft, 26ft c RW CGA   · decreased gait speed, FFP, flat foot contact, mild unsteadiness, mod c/o BLE weakness, and c/o "legs going to give out.  · Balance: sitting (S); standing (CGA)      AM-PAC 6 CLICK MOBILITY  Turning over in bed (including adjusting bedclothes, sheets and blankets)?: 3  Sitting down on and standing up from a chair with arms (e.g., wheelchair, bedside commode, etc.): 3  Moving from lying on back to sitting on the side of the bed?: 3  Moving to and from a bed to a chair (including a wheelchair)?: 3  Need to walk in hospital room?: 3  Climbing 3-5 steps with a railing?: 1  Basic Mobility Total Score: 16       Therapeutic Activities and Exercises:   Pt educated on: PT role/POC; safety c mobility; benefits of OOB activities; performing therex; d/c recs - v/u  - Static standing: x2-3 min at sink for self care c OT      Patient left up in chair with all lines intact, call button in reach, RN notified and mother present..    GOALS:   Multidisciplinary Problems     Physical Therapy Goals        Problem: Physical Therapy Goal    Goal Priority Disciplines Outcome Goal Variances Interventions   Physical Therapy Goal     PT, PT/OT Ongoing (interventions implemented as appropriate)     Description:  Goals to be met by: 1/14/2018     Patient will increase functional independence " with mobility by performin. Supine to sit with Modified [place] -not met  2. Sit to stand transfer with [place] -not met  3. Bed to chair transfer with independence using no AD -not met  4. Gait  x150 feet with Supervision using LRAD. -not met  5. Lower extremity exercise program x20 reps per handout, with independence -not met                       Multidisciplinary Problems (Resolved)        Problem: Physical Therapy Goal    Goal Priority Disciplines Outcome Goal Variances Interventions   Physical Therapy Goal   (Resolved)     PT, PT/OT Resolved for Upgrade                     Time Tracking:     PT Received On: 19  PT Start Time: 1100     PT Stop Time: 1120  PT Total Time (min): 20 min     Billable Minutes: Gait Training 15 min    Treatment Type: Treatment  PT/PTA: PT     PTA Visit Number: 0     Percy Coleman, PT  2019

## 2019-01-03 NOTE — ASSESSMENT & PLAN NOTE
- pt with sob and chest discomfort 1/1/19.  - RA sats %.  - chest xray showed moderate edema.  - cardiac enzymes and ekg negative on 1/1 and 1/2.  - Lasix 60 mg IV given x 2 (last dose on 1/2) with appropriate response.  - ABGs stable. Reviewed with staff.   - now improved.

## 2019-01-03 NOTE — ASSESSMENT & PLAN NOTE
- Cont with bactrim and valcyte for protection against opportunistic infections.   - Cont fluconazole for fungal prophylaxis.

## 2019-01-03 NOTE — PROGRESS NOTES
Pt to nuclear med for gastric emptying study per stretcher. Telemetry maintained.  Pt able to stand up with 2 person assist to stretcher.

## 2019-01-04 LAB
ABO + RH BLD: NORMAL
ALBUMIN SERPL BCP-MCNC: 2.4 G/DL
ALP SERPL-CCNC: 179 U/L
ALT SERPL W/O P-5'-P-CCNC: 12 U/L
ANION GAP SERPL CALC-SCNC: 12 MMOL/L
AST SERPL-CCNC: 13 U/L
BASOPHILS # BLD AUTO: 0.27 K/UL
BASOPHILS NFR BLD: 3.1 %
BILIRUB SERPL-MCNC: 0.9 MG/DL
BLD GP AB SCN CELLS X3 SERPL QL: NORMAL
BLD PROD TYP BPU: NORMAL
BLOOD UNIT EXPIRATION DATE: NORMAL
BLOOD UNIT TYPE CODE: 5100
BLOOD UNIT TYPE: NORMAL
BUN SERPL-MCNC: 62 MG/DL
CALCIUM SERPL-MCNC: 9.4 MG/DL
CHLORIDE SERPL-SCNC: 103 MMOL/L
CO2 SERPL-SCNC: 25 MMOL/L
CODING SYSTEM: NORMAL
CREAT SERPL-MCNC: 1.4 MG/DL
DIFFERENTIAL METHOD: ABNORMAL
DISPENSE STATUS: NORMAL
EOSINOPHIL # BLD AUTO: 0.7 K/UL
EOSINOPHIL NFR BLD: 8.5 %
ERYTHROCYTE [DISTWIDTH] IN BLOOD BY AUTOMATED COUNT: 15.4 %
EST. GFR  (AFRICAN AMERICAN): >60 ML/MIN/1.73 M^2
EST. GFR  (NON AFRICAN AMERICAN): 57 ML/MIN/1.73 M^2
GLUCOSE SERPL-MCNC: 121 MG/DL
HCT VFR BLD AUTO: 24.3 %
HGB BLD-MCNC: 7.4 G/DL
IMM GRANULOCYTES # BLD AUTO: 0.12 K/UL
IMM GRANULOCYTES NFR BLD AUTO: 1.4 %
LYMPHOCYTES # BLD AUTO: 1 K/UL
LYMPHOCYTES NFR BLD: 11.8 %
MAGNESIUM SERPL-MCNC: 2 MG/DL
MCH RBC QN AUTO: 27.7 PG
MCHC RBC AUTO-ENTMCNC: 30.5 G/DL
MCV RBC AUTO: 91 FL
MONOCYTES # BLD AUTO: 0.8 K/UL
MONOCYTES NFR BLD: 9.1 %
NEUTROPHILS # BLD AUTO: 5.8 K/UL
NEUTROPHILS NFR BLD: 66.1 %
NRBC BLD-RTO: 0 /100 WBC
PHOSPHATE SERPL-MCNC: 4.6 MG/DL
PLATELET # BLD AUTO: 403 K/UL
PMV BLD AUTO: 11.7 FL
POCT GLUCOSE: 109 MG/DL (ref 70–110)
POCT GLUCOSE: 134 MG/DL (ref 70–110)
POCT GLUCOSE: 136 MG/DL (ref 70–110)
POCT GLUCOSE: 156 MG/DL (ref 70–110)
POTASSIUM SERPL-SCNC: 4 MMOL/L
PROT SERPL-MCNC: 6.4 G/DL
RBC # BLD AUTO: 2.67 M/UL
SODIUM SERPL-SCNC: 140 MMOL/L
TACROLIMUS BLD-MCNC: 7.5 NG/ML
TRANS ERYTHROCYTES VOL PATIENT: NORMAL ML
WBC # BLD AUTO: 8.75 K/UL

## 2019-01-04 PROCEDURE — P9021 RED BLOOD CELLS UNIT: HCPCS

## 2019-01-04 PROCEDURE — 63600175 PHARM REV CODE 636 W HCPCS: Performed by: PHYSICIAN ASSISTANT

## 2019-01-04 PROCEDURE — 99233 PR SUBSEQUENT HOSPITAL CARE,LEVL III: ICD-10-PCS | Mod: 24,,, | Performed by: NURSE PRACTITIONER

## 2019-01-04 PROCEDURE — 97116 GAIT TRAINING THERAPY: CPT

## 2019-01-04 PROCEDURE — 84100 ASSAY OF PHOSPHORUS: CPT

## 2019-01-04 PROCEDURE — 83735 ASSAY OF MAGNESIUM: CPT

## 2019-01-04 PROCEDURE — 99233 SBSQ HOSP IP/OBS HIGH 50: CPT | Mod: 24,,, | Performed by: NURSE PRACTITIONER

## 2019-01-04 PROCEDURE — 36430 TRANSFUSION BLD/BLD COMPNT: CPT

## 2019-01-04 PROCEDURE — 86920 COMPATIBILITY TEST SPIN: CPT

## 2019-01-04 PROCEDURE — 80053 COMPREHEN METABOLIC PANEL: CPT

## 2019-01-04 PROCEDURE — 94761 N-INVAS EAR/PLS OXIMETRY MLT: CPT

## 2019-01-04 PROCEDURE — 25000003 PHARM REV CODE 250: Performed by: NURSE PRACTITIONER

## 2019-01-04 PROCEDURE — 80197 ASSAY OF TACROLIMUS: CPT

## 2019-01-04 PROCEDURE — 99232 PR SUBSEQUENT HOSPITAL CARE,LEVL II: ICD-10-PCS | Mod: ,,, | Performed by: NURSE PRACTITIONER

## 2019-01-04 PROCEDURE — 86850 RBC ANTIBODY SCREEN: CPT

## 2019-01-04 PROCEDURE — 20600001 HC STEP DOWN PRIVATE ROOM

## 2019-01-04 PROCEDURE — 94664 DEMO&/EVAL PT USE INHALER: CPT

## 2019-01-04 PROCEDURE — 25000003 PHARM REV CODE 250: Performed by: STUDENT IN AN ORGANIZED HEALTH CARE EDUCATION/TRAINING PROGRAM

## 2019-01-04 PROCEDURE — 99900035 HC TECH TIME PER 15 MIN (STAT)

## 2019-01-04 PROCEDURE — C9113 INJ PANTOPRAZOLE SODIUM, VIA: HCPCS | Performed by: NURSE PRACTITIONER

## 2019-01-04 PROCEDURE — 36415 COLL VENOUS BLD VENIPUNCTURE: CPT

## 2019-01-04 PROCEDURE — 63600175 PHARM REV CODE 636 W HCPCS: Performed by: NURSE PRACTITIONER

## 2019-01-04 PROCEDURE — 25000003 PHARM REV CODE 250: Performed by: PHYSICIAN ASSISTANT

## 2019-01-04 PROCEDURE — 99232 SBSQ HOSP IP/OBS MODERATE 35: CPT | Mod: ,,, | Performed by: NURSE PRACTITIONER

## 2019-01-04 PROCEDURE — 85025 COMPLETE CBC W/AUTO DIFF WBC: CPT

## 2019-01-04 PROCEDURE — 63600175 PHARM REV CODE 636 W HCPCS: Mod: JG | Performed by: INTERNAL MEDICINE

## 2019-01-04 RX ORDER — HYDROCODONE BITARTRATE AND ACETAMINOPHEN 500; 5 MG/1; MG/1
TABLET ORAL
Status: DISCONTINUED | OUTPATIENT
Start: 2019-01-04 | End: 2019-01-08

## 2019-01-04 RX ORDER — SUCRALFATE 1 G/10ML
1 SUSPENSION ORAL EVERY 8 HOURS
Status: DISCONTINUED | OUTPATIENT
Start: 2019-01-04 | End: 2019-01-08 | Stop reason: HOSPADM

## 2019-01-04 RX ORDER — FUROSEMIDE 10 MG/ML
40 INJECTION INTRAMUSCULAR; INTRAVENOUS ONCE
Status: COMPLETED | OUTPATIENT
Start: 2019-01-04 | End: 2019-01-04

## 2019-01-04 RX ADMIN — FLUCONAZOLE 200 MG: 40 POWDER, FOR SUSPENSION ORAL at 08:01

## 2019-01-04 RX ADMIN — TACROLIMUS 2 MG: 1 CAPSULE ORAL at 08:01

## 2019-01-04 RX ADMIN — TIZANIDINE 2 MG: 2 TABLET ORAL at 03:01

## 2019-01-04 RX ADMIN — TIZANIDINE 2 MG: 2 TABLET ORAL at 11:01

## 2019-01-04 RX ADMIN — ERYTHROPOIETIN 4200 UNITS: 10000 INJECTION, SOLUTION INTRAVENOUS; SUBCUTANEOUS at 11:01

## 2019-01-04 RX ADMIN — SUCRALFATE 1 G: 1 SUSPENSION ORAL at 09:01

## 2019-01-04 RX ADMIN — DRONABINOL 2.5 MG: 2.5 CAPSULE ORAL at 09:01

## 2019-01-04 RX ADMIN — LIDOCAINE 1 PATCH: 50 PATCH CUTANEOUS at 03:01

## 2019-01-04 RX ADMIN — PANTOPRAZOLE SODIUM 40 MG: 40 INJECTION, POWDER, LYOPHILIZED, FOR SOLUTION INTRAVENOUS at 08:01

## 2019-01-04 RX ADMIN — Medication 500 MG: at 09:01

## 2019-01-04 RX ADMIN — HEPARIN SODIUM 5000 UNITS: 5000 INJECTION, SOLUTION INTRAVENOUS; SUBCUTANEOUS at 02:01

## 2019-01-04 RX ADMIN — PANTOPRAZOLE SODIUM 40 MG: 40 INJECTION, POWDER, LYOPHILIZED, FOR SOLUTION INTRAVENOUS at 09:01

## 2019-01-04 RX ADMIN — ESCITALOPRAM OXALATE 20 MG: 5 SOLUTION ORAL at 08:01

## 2019-01-04 RX ADMIN — FUROSEMIDE 40 MG: 10 INJECTION, SOLUTION INTRAMUSCULAR; INTRAVENOUS at 12:01

## 2019-01-04 RX ADMIN — HEPARIN SODIUM 5000 UNITS: 5000 INJECTION, SOLUTION INTRAVENOUS; SUBCUTANEOUS at 06:01

## 2019-01-04 RX ADMIN — Medication 12.5 MG: at 08:01

## 2019-01-04 RX ADMIN — HEPARIN SODIUM 5000 UNITS: 5000 INJECTION, SOLUTION INTRAVENOUS; SUBCUTANEOUS at 09:01

## 2019-01-04 RX ADMIN — SULFAMETHOXAZOLE AND TRIMETHOPRIM 1 TABLET: 400; 80 TABLET ORAL at 08:01

## 2019-01-04 RX ADMIN — ONDANSETRON 4 MG: 2 INJECTION INTRAMUSCULAR; INTRAVENOUS at 05:01

## 2019-01-04 RX ADMIN — MORPHINE 450 MG: 10 SOLUTION ORAL at 08:01

## 2019-01-04 RX ADMIN — Medication 500 MG: at 08:01

## 2019-01-04 RX ADMIN — SUCRALFATE 1 G: 1 SUSPENSION ORAL at 02:01

## 2019-01-04 RX ADMIN — TACROLIMUS 2 MG: 1 CAPSULE ORAL at 05:01

## 2019-01-04 RX ADMIN — Medication 12.5 MG: at 09:01

## 2019-01-04 RX ADMIN — TIZANIDINE 2 MG: 2 TABLET ORAL at 08:01

## 2019-01-04 RX ADMIN — DRONABINOL 2.5 MG: 2.5 CAPSULE ORAL at 08:01

## 2019-01-04 NOTE — TREATMENT PLAN
GI Treatment Plan    Femi Enciso is a 53 y.o. male admitted to hospital 10/27/2018 (Hospital Day: 70) due to S/P liver transplant.     Interval History  We were contacted as the patient has persistent nausea and vomiting.  Earlier today, a gastric emptying study was attempted and the patient could not tolerate study, threw up after eating eggs.  The patient states that after he eats any solid food, within 2 minutes he starts retching, and throws up gastric contents, but states that the food does not come back up. He reports that earlier today he vomited during the study, however did not see the eggs came back up.  He denies abdominal pain. States that his BMs are normal. Denies fevers or chills. Denies history similar to this  EGD was done earlier during the admssion, biopsies pending.  Currently patient is receiving tube feeding, which he is tolerating. Gets marinol, but states that appetite has not been the major issue for him. Gets zofran as needed for nausea. Used twice in the past week, and the patient is not sure if it helps. No phenergan or reglan tried in the past week    Objective  Temp:  [97.9 °F (36.6 °C)-98.5 °F (36.9 °C)] 98.5 °F (36.9 °C) (01/03 1939)  Pulse:  [82-96] 87 (01/04 0400)  BP: (122-129)/(85-90) 129/90 (01/04 0400)  Resp:  [26-34] 29 (01/04 0400)  SpO2:  [96 %-99 %] 99 % (01/04 0400)    General: Alert, Oriented x3, no distress  Abdomen: Normoactive bowel sounds. Non-distended. Normal tympany. Soft. Non-tender. No peritoneal signs.    Laboratory    Recent Labs   Lab 01/01/19  0712 01/02/19  0642 01/03/19  0620   HGB 7.9* 7.6* 7.3*       Lab Results   Component Value Date    WBC 8.26 01/03/2019    HGB 7.3 (L) 01/03/2019    HCT 24.2 (L) 01/03/2019    MCV 91 01/03/2019     (H) 01/03/2019       Lab Results   Component Value Date     01/03/2019    K 3.7 01/03/2019     01/03/2019    CO2 27 01/03/2019    BUN 65 (H) 01/03/2019    CREATININE 1.4 01/03/2019    CALCIUM 9.5 01/03/2019     ANIONGAP 12 01/03/2019    ESTGFRAFRICA >60.0 01/03/2019    EGFRNONAA 57.0 (A) 01/03/2019       Lab Results   Component Value Date    ALT 14 01/03/2019    AST 15 01/03/2019     (H) 11/26/2018    ALKPHOS 192 (H) 01/03/2019    BILITOT 0.9 01/03/2019       Lab Results   Component Value Date    INR 1.1 12/18/2018    INR 1.1 12/17/2018    INR 1.1 12/16/2018       Plan  Unclear cause of nausea and vomiting for this patient s/p LTx.  Symptoms can be consistent with bile reflux. Can initiate a trial of sucralfate 1g QID  Also, can start a trial of reglan 10mg to be given prior to meals to help with decreasing nausea.  Could not tolerate gastric emptying study. Recommend upper GI series to evaluate for anatomic/physiologic abnormality contributing to nausea   - Plan of care was discussed with primary team.  - We will continue to follow.    Thank you for involving us in the care of Femi Fernie. Please call with any additional questions, concerns or changes in the patient's clinical status.

## 2019-01-04 NOTE — ASSESSMENT & PLAN NOTE
- pt reports difficulty swallowing pills.  - SLP consult, appreciate recs.  - switched PO meds that pt is unable to swallow to liquid/IV.  - Dysphagia has since improved but pt now with persistent nausea limiting PO intake.

## 2019-01-04 NOTE — ASSESSMENT & PLAN NOTE
- 2 weeks for DEL prior to transplant.  - Renal function slow to recover post-op.   - Nephrology consulted for management.   - Cont with HD as per nephrology recs.  Apprec recs.    - Lasix 160 mg challenge 11/28 w/ minimal output.    - HD MWF. Tolerated well 12/26.    - Held HD on 12/28.   - Strict I&Os.   - Albumin 25% x 3 doses 12/28.  - Renal function continuing to improve. Creatinine decreasing with increasing UOP.   - Has not required HD since Wednesday 12/26.   - Plan for one dose of lasix today post blood transfusion.

## 2019-01-04 NOTE — PLAN OF CARE
Problem: Physical Therapy Goal  Goal: Physical Therapy Goal  Goals to be met by: 2018     Patient will increase functional independence with mobility by performin. Supine to sit with Modified [place] -not met  2. Sit to stand transfer with [place] -not met  3. Bed to chair transfer with independence using no AD -not met  4. Gait  x150 feet with Supervision using LRAD. -not met  5. Lower extremity exercise program x20 reps per handout, with independence -not met            Outcome: Ongoing (interventions implemented as appropriate)  LTGs remain appropriate. Pt will continue PT POC.    La Nena Armstrong, PT  2019

## 2019-01-04 NOTE — PROGRESS NOTES
" [facility]  Adult Nutrition  Progress Note     SUMMARY       Recommendations  Recommendation/Intervention:   1. Excessive infusion rate, TF meeting 121% EEN and 109% EPN. Recommend decreasing TF rate to 40mL/hr to meet EEN/EPN.   2. When medically able, ADAT per SLP  3. Dietitian Following     Patient with Severe Malnutrition in the context of Chronic Illness/Injury.     Goals: Patient to meet >85% of EEN/EPN  Nutrition Goal Status: goal met  Communication of RD Recs: (POC)    Reason for Assessment  Reason For Assessment: RD follow-up  Diagnosis: transplant/postoperative complications(s/p OLTx 11/11)  Relevant Medical History: ESLD 2/2 alcoholic cirrhosis, ESRD on HD, DM2  Interdisciplinary Rounds: attended  General Information Comments: TF running at 50mL/hr, tolerating well. Excessive infusion rate meeting >100% of EEN. Unable to tolerated gastric emptying study yesterday, vomited eggs. Appetite stimulant started. Per NP note, Will attempt to repeat upper GI with small bowel follow through before deciding on PEG placement. NFPE completed 12/14/18. Patient with Severe Malnutrition in the context of Chronic Illness/Injury  Nutrition Discharge Planning: Adequate nutrition    Nutrition Risk Screen  Nutrition Risk Screen: tube feeding or parenteral nutrition    Nutrition/Diet History  Patient Reported Diet/Restrictions/Preferences: low salt, general  Food Preferences: noted  Spiritual, Cultural Beliefs, Cheondoism Practices, Values that Affect Care: no  Food Allergies: NKFA  Factors Affecting Nutritional Intake: NPO    Anthropometrics  Temp: 98 °F (36.7 °C)  Height Method: Stated  Height: 5' 10" (177.8 cm)  Height (inches): 70 in  Weight Method: Bed Scale  Weight: 67.2 kg (148 lb 2.4 oz)  Weight (lb): 148.15 lb  Ideal Body Weight (IBW), Male: 166 lb  % Ideal Body Weight, Male (lb): 119.13 lb  BMI (Calculated): 28.4  BMI Grade: 25 - 29.9 - overweight    Lab/Procedures/Meds  Labs: Reviewed    " 01/04/2019    K 4.0 01/04/2019    BUN 62 (H) 01/04/2019    CREATININE 1.4 01/04/2019    CALCIUM 9.4 01/04/2019    PHOS 4.6 (H) 01/04/2019    MG 2.0 01/04/2019    EGFRNONAA 57.0 (A) 01/04/2019    ALBUMIN 2.4 (L) 01/04/2019      ALT 12 01/04/2019    AST 13 01/04/2019    ALKPHOS 179 (H) 01/04/2019     POCT Glucose   Date Value Ref Range Status   01/04/2019 134 (H) 70 - 110 mg/dL Final   01/04/2019 109 70 - 110 mg/dL Final   01/03/2019 213 (H) 70 - 110 mg/dL Final   01/03/2019 196 (H) 70 - 110 mg/dL Final   01/02/2019 207 (H) 70 - 110 mg/dL Final   01/02/2019 176 (H) 70 - 110 mg/dL Final   01/01/2019 225 (H) 70 - 110 mg/dL Final     Meds: Reviewed  Scheduled Meds:   aspirin  81 mg Oral Daily    dronabinol  2.5 mg Oral BID    epoetin sherlyn (PROCRIT) injection  50 Units/kg (Dosing Weight) Intravenous Every Mon, Wed, Fri    ergocalciferol  50,000 Units Oral Q7 Days    escitalopram oxalate  20 mg Oral Daily    fluconazole 40 mg/ml  200 mg Oral Daily    furosemide  40 mg Intravenous Once    heparin (porcine)  5,000 Units Subcutaneous Q8H    lidocaine  1 patch Transdermal Q24H    metoprolol  12.5 mg Per NG tube BID    mycophenolate mofetil  500 mg Oral BID    pantoprazole  40 mg Intravenous BID    sucralfate  1 g Oral Q8H    sulfamethoxazole-trimethoprim 400-80mg  1 tablet Oral Daily    tacrolimus  2 mg Oral BID    valganciclovir 50 mg/ml  450 mg Oral Daily     Estimated/Assessed Needs  Weight Used For Calorie Calculations: 66.5 kg (146 lb 9.7 oz)  Energy Calorie Requirements (kcal): 1970(PAL 1.3)  Energy Need Method: [facility]  Protein Requirements: 79-99(1.2-1.5 gm/kg)  Weight Used For Protein Calculations: 66.5 kg (146 lb 9.7 oz)  Fluid Requirements (mL): 1 mL/kcal or per MD  Estimated Fluid Requirement Method: RDA Method  RDA Method (mL): 1970     Nutrition Prescription Ordered  Current Diet Order: NPO  Current Nutrition Support Formula Ordered: Novasource Renal  Current Nutrition Support Rate  Ordered: 50 (ml)  Current Nutrition Support Frequency Ordered: 50mL/hr x 24hrs  Oral Nutrition Supplement: Boost Plus, Boost Breeze    Evaluation of Received Nutrient/Fluid Intake in last 24h  Enteral Calories (kcal): 2400  Enteral Protein (gm): 109  Enteral (Free Water) Fluid (mL): 860  % Kcal Needs: 121  % Protein Needs: 109  I/O: +7.8L since 12/21/18  Energy Calories Required: exceeds needs  Protein Required: exceeds needs  Comments: LBM 1/2/19  Tolerance: tolerating  % Intake of Estimated Energy Needs: Other: 100%, TF Exceeds Needs  % Meal Intake: NPO    Nutrition Risk  Level of Risk/Frequency of Follow-up: high(2x week)     Assessment and Plan  NFPE 12/14/18  Malnutrition in the context of Chronic Illness/Injury     Related to (etiology):  Cirrhosis s/p OLTx 11/11 and poor appetite      Signs and Symptoms (as evidenced by):  Energy Intake: <75% of estimated energy requirement for 2 months  Body Fat Depletion: severe overall subcutaneous fat loss and depletion of orbital area, triceps as well as protruding patellas, ribs and acromion process  Muscle Mass Depletion: severe scapular, temporal, calf, quadricep muscle wasting   Weight Loss: 16.5% x 2 months      Nutrition Diagnosis Status:   Continues    Monitor and Evaluation  Food and Nutrient Intake: energy intake, food and beverage intake  Food and Nutrient Adminstration: diet order  Knowledge/Beliefs/Attitudes: food and nutrition knowledge/skill  Physical Activity and Function: factors affecting access to physical activity  Anthropometric Measurements: weight, weight change, body mass index  Biochemical Data, Medical Tests and Procedures: electrolyte and renal panel, gastrointestinal profile, glucose/endocrine profile, inflammatory profile, lipid profile  Nutrition-Focused Physical Findings: overall appearance, extremities, muscles and bones, head and eyes, skin     Nutrition Follow-Up  RD Follow-up?: Yes

## 2019-01-04 NOTE — SUBJECTIVE & OBJECTIVE
Scheduled Meds:   aspirin  81 mg Oral Daily    dronabinol  2.5 mg Oral BID    epoetin sherlyn (PROCRIT) injection  50 Units/kg (Dosing Weight) Intravenous Every Mon, Wed, Fri    ergocalciferol  50,000 Units Oral Q7 Days    escitalopram oxalate  20 mg Oral Daily    fluconazole 40 mg/ml  200 mg Oral Daily    furosemide  40 mg Intravenous Once    heparin (porcine)  5,000 Units Subcutaneous Q8H    lidocaine  1 patch Transdermal Q24H    metoprolol  12.5 mg Per NG tube BID    mycophenolate mofetil  500 mg Oral BID    pantoprazole  40 mg Intravenous BID    sucralfate  1 g Oral Q8H    sulfamethoxazole-trimethoprim 400-80mg  1 tablet Oral Daily    tacrolimus  2 mg Oral BID    valganciclovir 50 mg/ml  450 mg Oral Daily     Continuous Infusions:  PRN Meds:sodium chloride, acetaminophen, albuterol-ipratropium, aluminum & magnesium hydroxide-simethicone, artificial tears, bacitracin, bisacodyl, dextrose 50%, dextrose 50%, glucagon (human recombinant), glucose, glucose, heparin (porcine), insulin aspart U-100, ondansetron, tiZANidine, traMADol    Review of Systems   Constitutional: Positive for activity change and appetite change (decreased). Negative for chills, fatigue and fever.   HENT: Negative for congestion, facial swelling, sore throat and voice change.    Eyes: Negative for pain, discharge and visual disturbance.   Respiratory: Negative for apnea, cough, choking, chest tightness, shortness of breath and wheezing.    Cardiovascular: Negative for chest pain, palpitations and leg swelling.   Gastrointestinal: Positive for nausea. Negative for abdominal distention, abdominal pain, constipation, diarrhea and vomiting.   Endocrine: Negative.    Genitourinary: Negative for difficulty urinating, dysuria, hematuria and urgency.   Musculoskeletal: Negative.  Negative for back pain, gait problem, neck pain and neck stiffness.   Skin: Positive for wound. Negative for color change and pallor.   Allergic/Immunologic:  Positive for immunocompromised state.   Neurological: Positive for weakness. Negative for dizziness, seizures, light-headedness and headaches.   Hematological: Negative.    Psychiatric/Behavioral: Negative for behavioral problems, confusion, decreased concentration, dysphoric mood, hallucinations, sleep disturbance and suicidal ideas. The patient is not nervous/anxious.      Objective:     Vital Signs (Most Recent):  Temp: 98 °F (36.7 °C) (01/04/19 1457)  Pulse: 85 (01/04/19 1336)  Resp: (!) 27 (01/04/19 1336)  BP: (!) 139/91 (01/04/19 1300)  SpO2: 98 % (01/04/19 1336) Vital Signs (24h Range):  Temp:  [97.9 °F (36.6 °C)-98.5 °F (36.9 °C)] 98 °F (36.7 °C)  Pulse:  [82-87] 85  Resp:  [26-34] 27  SpO2:  [96 %-99 %] 98 %  BP: (122-139)/(85-91) 139/91     Weight: 67.2 kg (148 lb 2.4 oz)  Body mass index is 21.26 kg/m².    Intake/Output - Last 3 Shifts       01/02 0700 - 01/03 0659 01/03 0700 - 01/04 0659 01/04 0700 - 01/05 0659    P.O. 540 300     I.V. (mL/kg)  100 (1.5)     NG/ 780 60    Total Intake(mL/kg) 1490 (21.6) 1180 (17.6) 60 (0.9)    Urine (mL/kg/hr) 1325 (0.8) 725 (0.4) 150 (0.3)    Stool       Total Output 1325 725 150    Net +165 +455 -90           Urine Occurrence  1 x           Physical Exam   Constitutional: He is oriented to person, place, and time. He appears well-developed. No distress.   Temporal and distal extremity muscle wasting noted.   HENT:   Head: Normocephalic and atraumatic.   Mouth/Throat: Oropharynx is clear and moist. No oropharyngeal exudate.   Eyes: EOM are normal.   Neck: Normal range of motion. Neck supple. No JVD present.   Cardiovascular: Normal rate, regular rhythm, normal heart sounds and intact distal pulses.   No murmur heard.  Pulmonary/Chest: Effort normal. No respiratory distress. He has decreased breath sounds in the right lower field and the left lower field. He has no wheezes. He exhibits no tenderness.   Abdominal: Soft. Bowel sounds are normal. He exhibits no  distension. There is no tenderness.   Chevron inc clean, dry, intact with steri strips in place     Musculoskeletal: Normal range of motion. He exhibits no edema or tenderness.   Neurological: He is alert and oriented to person, place, and time. He has normal reflexes.   Skin: Skin is warm and dry. Capillary refill takes 2 to 3 seconds. He is not diaphoretic. No pallor.   Psychiatric: He has a normal mood and affect. His behavior is normal. Thought content normal. His affect is not labile. He is not slowed. Cognition and memory are not impaired.   Nursing note and vitals reviewed.      Laboratory:  Immunosuppressants         Stop Route Frequency     tacrolimus capsule 2 mg      -- Oral 2 times daily     mycophenolate mofetil 200 mg/mL suspension 500 mg      -- Oral 2 times daily        CBC:   Recent Labs   Lab 01/04/19  0655   WBC 8.75   RBC 2.67*   HGB 7.4*   HCT 24.3*   *   MCV 91   MCH 27.7   MCHC 30.5*     CMP:   Recent Labs   Lab 01/04/19  0655   *   CALCIUM 9.4   ALBUMIN 2.4*   PROT 6.4      K 4.0   CO2 25      BUN 62*   CREATININE 1.4   ALKPHOS 179*   ALT 12   AST 13   BILITOT 0.9     Labs within the past 24 hours have been reviewed.    Diagnostic Results:  I have personally reviewed all pertinent imaging studies.

## 2019-01-04 NOTE — ASSESSMENT & PLAN NOTE
- pt with sob and chest discomfort 1/1/19.  - RA sats %.  - chest xray showed moderate edema.  - cardiac enzymes and ekg negative on 1/1 and 1/2.  - Lasix 60 mg IV given x 2 (last dose on 1/2) with appropriate response.  - ABGs stable. Reviewed with staff.   - Resp status overall improved now.

## 2019-01-04 NOTE — ASSESSMENT & PLAN NOTE
-2/2 deconditioning from liver trx     See hospital course for functional, cognitive/speech/language, and nutrition/swallow status.      Recommendations  -  Encourage mobility, OOB in chair at least 3 hours per day, and early ambulation as appropriate  -  PT/OT evaluate and treat  -  Pain management  -  Monitor for and prevent skin breakdown and pressure ulcers  · Early mobility, repositioning/weight shifting every 20-30 minutes when sitting, turn patient every 2 hours, proper mattress/overlay and chair cushioning, pressure relief/heel protector boots  -  DVT prophylaxis:  [facility]  -  Reviewed discharge options (IP rehab, SNF, HH therapy, and OP therapy)

## 2019-01-04 NOTE — ASSESSMENT & PLAN NOTE
-dyspnea on 1/1/19--> CXR showed moderated edema.   - Lasix 60 mg IV on 1/1 and 1/2/19 with improvement  -on RA

## 2019-01-04 NOTE — ASSESSMENT & PLAN NOTE
- Dietician following. Severe temporal, clavicle muscle depletion noted. Severe subq fat loss.  - Nutrition has been poor over the past 24 hours.   - Discussed at length with patient and caregivers that if patient's nutrition status does not improve, will need supplemental nutrition via tube feeds or TPN.  - Appetite stimulant started 12/19.   - Caregivers to bring in outside food for patient.   - Continue calorie count.  - EGD Monday 12/24 as pt c/o poor appetite + burning in chest/esophagus and occasionally vomiting after eating x several weeks.   - EGD 12/24 unremarkable. prabhakar tube placed afterwards.   - TF started for nutrition 12/24/18. TF at goal rate, 50 ml/hr.  - Prealbumin 7 on 12/26. Repeat 9 on 1/3.  - Started scheduled remeron 12/31.   - dc remeron and start marinol 1/3.  - Cont to watch pt on marinol at this time.

## 2019-01-04 NOTE — PLAN OF CARE
Pt is AAOx4; afebrile; vital signs stable. 1URBC given today.  [place] tube slid out and could not be advanced; another one was placed and x-ray done. Dr. Alonzo OK'd JHON to be used and tube feeds started back at 50/hr.  BG ranged from 109-156. He will be NPO on Sunday at midnight for EGD and possible PEG placement. Xanaflex given to moderately control back pain. Lasix given after blood admin. He is up to the chair with one assist. Mother at bedside, attentive to pt.

## 2019-01-04 NOTE — PT/OT/SLP PROGRESS
Physical Therapy Treatment    Patient Name:  Femi Enciso   MRN:  [id number]    Recommendations:     Discharge Recommendations:  nursing facility, skilled   Discharge Equipment Recommendations: (TBD at next level of care)   Barriers to discharge: None    Assessment:     Femi Enciso is a 53 y.o. male admitted with a medical diagnosis of S/P liver transplant.  He presents with the following impairments/functional limitations:  weakness, impaired endurance, impaired self care skills, impaired functional mobilty, gait instability, impaired balance, decreased lower extremity function . Pt blanca session well w/ good participation. He was able to inc amb distance. Pt is progressing well but continues to be limited by the above mentioned deficits and requires ModA for sit>stand transfers. Pt will continue PT POC.    Rehab Prognosis: Good; patient would benefit from acute skilled PT services to address these deficits and reach maximum level of function.    Recent Surgery: Procedure(s) (LRB):  EGD (ESOPHAGOGASTRODUODENOSCOPY) (N/A) 11 Days Post-Op    Plan:     During this hospitalization, patient to be seen 4 x/week to address the identified rehab impairments via gait training, therapeutic activities, therapeutic exercises and progress toward the following goals:    · Plan of Care Expires:  01/17/19    Subjective     Chief Complaint: BLE weakness  Patient/Family Comments/goals: return to PLOF  Pain/Comfort:  · Pain Rating 1: 0/10      Objective:     Communicated with nursing prior to session.  Patient found UIC w/ all lines intact and call button in reach telemetry, NG tube  upon PT entry to room.     General Precautions: Standard, fall   Orthopedic Precautions:N/A   Braces: N/A     Functional Mobility:  · Transfers:    · Sit<>stand to/from bedside chair (2 trials) w/ ModA to rise  · Cueing for forward lean and hand/foot placement  · Gait:   · 2 trials (20ft and 36ft) w/ RW and Min/CGA for stability  · Cueing to remain inside RW and  to inc step length/height  · Limited in distance by fatigue  · Balance:   · Static stand w/ RW and Min/CGA for stability      AM-PAC 6 CLICK MOBILITY  Turning over in bed (including adjusting bedclothes, sheets and blankets)?: 3  Sitting down on and standing up from a chair with arms (e.g., wheelchair, bedside commode, etc.): 2  Moving from lying on back to sitting on the side of the bed?: 3  Moving to and from a bed to a chair (including a wheelchair)?: 2  Need to walk in hospital room?: 3  Climbing 3-5 steps with a railing?: 1  Basic Mobility Total Score: 14       Therapeutic Activities and Exercises:   Seated BLE therex 2x10 reps (GS, QS, AP)    Patient left up in chair with all lines intact, call button in reach and nurse present..    GOALS:   Multidisciplinary Problems     Physical Therapy Goals        Problem: Physical Therapy Goal    Goal Priority Disciplines Outcome Goal Variances Interventions   Physical Therapy Goal     PT, PT/OT Ongoing (interventions implemented as appropriate)     Description:  Goals to be met by: 2018     Patient will increase functional independence with mobility by performin. Supine to sit with Modified [place] -not met  2. Sit to stand transfer with [place] -not met  3. Bed to chair transfer with independence using no AD -not met  4. Gait  x150 feet with Supervision using LRAD. -not met  5. Lower extremity exercise program x20 reps per handout, with independence -not met                       Multidisciplinary Problems (Resolved)        Problem: Physical Therapy Goal    Goal Priority Disciplines Outcome Goal Variances Interventions   Physical Therapy Goal   (Resolved)     PT, PT/OT Resolved for Upgrade                     Time Tracking:     PT Received On: 19  PT Start Time: 1408     PT Stop Time: 1422  PT Total Time (min): 14 min     Billable Minutes: Gait Training 14 and Total Time 14    Treatment Type: Treatment  PT/PTA: PT     PTA Visit Number: 0      La Nena Armstrong, PT  01/04/2019

## 2019-01-04 NOTE — PROGRESS NOTES
[facility]  Physical Medicine & Rehab  Progress Note    Patient Name: Femi Enciso  MRN: [id number]  Admission Date: 10/27/2018  Length of Stay: 69 days  Attending Physician: Femi Patel MD    Subjective:     Principal Problem:S/P liver transplant    Hospital Course:   11/21/2018: Evaluated by therapy.  Bed mobility MaxA.  Sit to stand ModA x 2 ppl.  Ambulated 1 step MaxA.  UBD/LBD/toileting MaxA. Grooming Julito.  12/19/2018: Participated with therapy.  Bed mobility [place].  Sit to stand Min-ModA and transfers ModA x 2 ppl.  No gait 2/2 nausea and dizziness during steps for trx.  UBD [place] and feeding (I).   12/20/2018: Participated with OT.  Sit to stand ModA x 2 ppl. No gait 2/2 anxiety.  Grooming Mod (I).   12/21/2018: Participated with PT.  Bed mobility [place].  Sit to stand and transfers modA.  Ambulated ~3 steps modA.   12/24/2018: Participated with therapy.  Bed mobility CGA.  Sit to stand Min-ModA.  Ambulated-limited steps Min-MaxA x 2.  UBD [place].  12/26/18: No therapy 2/2 dialysis.  12/27/2018: Participated with therapy.  Bed mobility CGA.  Sit to stand and transfers [place].  Ambulated 3 ft [place].  Pt refused all oob activities and ADL training. Pt requested OT to teach pt how to perform BUE exercises w/ theraband.  12/31/2019: Participated with therapy.  Bed mobility SBA-[place].  Sit to stand [place] x 2 ppl.  Ambulated 20 ft [place] & RW.  UBD [place] and LBD/grooming setupA.  01/03/2019: Participated with therapy.  Bed mobility CGA.  Sit to stand CGA & RW and transfers [place] & RW.  Ambulated 56 ft CGA & RW.  Grooming SBA.    Interval History 1/4/2019:  Patient is seen for follow-up rehab evaluation and recommendations: [place] tube still in place. EGD pending for today per patient.    HPI, Past Medical, Family, and Social History remains the same as documented in the initial encounter.    Scheduled Medications:    aspirin  81 mg Oral Daily    dronabinol  2.5 mg Oral BID    epoetin sherlyn (PROCRIT) injection   50 Units/kg (Dosing Weight) Intravenous Every Mon, Wed, Fri    ergocalciferol  50,000 Units Oral Q7 Days    escitalopram oxalate  20 mg Oral Daily    fluconazole 40 mg/ml  200 mg Oral Daily    heparin (porcine)  5,000 Units Subcutaneous Q8H    lidocaine  1 patch Transdermal Q24H    metoprolol  12.5 mg Per NG tube BID    mycophenolate mofetil  500 mg Oral BID    pantoprazole  40 mg Intravenous BID    sucralfate  1 g Oral Q8H    sulfamethoxazole-trimethoprim 400-80mg  1 tablet Oral Daily    tacrolimus  2 mg Oral BID    valganciclovir 50 mg/ml  450 mg Oral Daily       Diagnostic Results: Labs: Reviewed    PRN Medications: acetaminophen, albuterol-ipratropium, aluminum & magnesium hydroxide-simethicone, artificial tears, bacitracin, bisacodyl, dextrose 50%, dextrose 50%, glucagon (human recombinant), glucose, glucose, heparin (porcine), insulin aspart U-100, ondansetron, tiZANidine, traMADol    Review of Systems   Constitutional: Positive for activity change, appetite change and fatigue (improving).   HENT: Positive for trouble swallowing. Negative for voice change.    Respiratory: Negative for cough and shortness of breath.    Cardiovascular: Negative for chest pain and palpitations.   Gastrointestinal: Negative for nausea and vomiting.   Musculoskeletal: Positive for gait problem. Negative for arthralgias.   Skin: Positive for wound (surgical). Negative for color change.   Neurological: Positive for dizziness and weakness.     Objective:     Vital Signs (Most Recent):  Temp: 98.5 °F (36.9 °C) (01/03/19 1939)  Pulse: 87 (01/04/19 0958)  Resp: (!) 26 (01/04/19 0958)  BP: (!) 132/91 (01/04/19 0715)  SpO2: 99 % (01/04/19 0958)    Vital Signs (24h Range):  Temp:  [97.9 °F (36.6 °C)-98.5 °F (36.9 °C)] 98.5 °F (36.9 °C)  Pulse:  [82-96] 87  Resp:  [26-34] 26  SpO2:  [96 %-99 %] 99 %  BP: (122-132)/(85-91) 132/91     Physical Exam   Constitutional: He is oriented to person, place, and time. He appears  well-developed.   HENT:   Head: Normocephalic and atraumatic.   Eyes: Right eye exhibits no discharge. Left eye exhibits no discharge. No scleral icterus.   Neck: Neck supple.   Cardiovascular: Normal rate and intact distal pulses.   Pulmonary/Chest: Effort normal. No respiratory distress.   Abdominal: Soft. He exhibits no distension.   [place] tube in place   Musculoskeletal: He exhibits no edema or deformity.   RUE: 5/5.  LUE: 5/5.  RLE: 3/5.  LLE: 3/5.   Neurological: He is alert and oriented to person, place, and time. No sensory deficit.   Skin: Skin is warm and dry.   Psychiatric: He has a normal mood and affect. His behavior is normal.     Assessment/Plan:      * S/P liver transplant    -s/p liver trx on 11/11 for alcoholic cirrhosis       Other dysphagia    -see decreased appetite     Impaired functional mobility and endurance    -2/2 deconditioning from liver trx     See hospital course for functional, cognitive/speech/language, and nutrition/swallow status.      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  -repeat US on 1/11/19 per  LTS     Severe malnutrition    -see decreased appetite     Decreased appetite    -  Decreased PO intake  -  s/p EGD 12/24->antrum and duodenal bulb bx  -  prabhakar tube placed afterwards-->TF started for nutrition  -  Appears more energetic with TFs  - GI reconsulted  -  Started on Marinol 1/3  -  unable to tolerate food for gastric emptying study.   -  EGD with small bowel follow through scheduled        Acute renal failure with acute tubular necrosis superimposed on stage 3 chronic kidney disease    -Nephrology following   -RRT held at this time, last HD 12/26  -kidney function improving          Pleural effusion associated with hepatic disorder    -dyspnea on 1/1/19--> CXR showed moderated edema.   - Lasix 60 mg IV on 1/1 and 1/2/19 with improvement  -on RA     Recommend Inpatient Rehab.  IRF preference per patient/[facility].  Barriers to IRF admission:  Medical stability pending.         Mela Scanlon NP  Department of Physical Medicine & Rehab   [facility]

## 2019-01-04 NOTE — ASSESSMENT & PLAN NOTE
- Pt remains significantly debilitated.   - PT/OT for strengthening.   - Currently will need SNF for placement and further rehabilitation.   - Pt remains severely debilitated and not strong enough for SNF at this time.    - SNF consult placed 12/17. However, denied for SNF again as not strong enough at this time.  - Rehab consulted.  We now have a contract w [facility] for rehab.  Rehab willing to accept patient when medically appropriate.    - Continue aggressive therapy.

## 2019-01-04 NOTE — ASSESSMENT & PLAN NOTE
- Previously on Dialysis MWF. On Hold for now as kidneys recovering.  - Last HD 12/26/18.  - Good UOP.  - Nephrology following, appreciate recs.  - Monitor.

## 2019-01-04 NOTE — PLAN OF CARE
Problem: Patient Care Overview  Goal: Plan of Care Review  Outcome: Ongoing (interventions implemented as appropriate)  Pt is AAOx4 in bed wearing non-skid footwear, bed in low/locked position and with call bell within reach. Pt reminded to use call bell to call for assistance, pt verbalizes understanding. Pt is afebrile at this time. Proper hand hygiene performed before and after pt care activities. [place] incision ECTOR with steri-strips. Tube feedings stopped at midnight for scope today. PRN Zanaflex administered this shift. Lidocaine patch applied to back. Denies any pain or discomfort at this time.

## 2019-01-04 NOTE — ASSESSMENT & PLAN NOTE
- Last liver US 11/27. Evolving peritransplant hematomas with anechoic fluid collection adjacent to the right hepatic lobe.  Small volume perihepatic free fluid.  - Chest/Abd/pelvis CT 12/4 - no fluid to be drainged per transplant surgeon.  No source of bleeding.    - H&H slightly low this am and will plan for one unit of PRBCs for replacement. Monitor.

## 2019-01-04 NOTE — ASSESSMENT & PLAN NOTE
- Intermittent, worse over past 10 days,  Changed Pepcid to Protonix 12/9/18.  Appetite seems to be slowly improving.    - Encourage multiple, small meals and cont PRN anti-emetics.    - GI consulted.  EGD 12/24 w normal esophagus, erythematous mucosa in the antrum and nodular mucosa in the duodenal bulb w biopsies.  Path pending.  - JHON placed 12/24.  Pt tolerating tube feeds.  Denies nausea w TF.    - Pt only tolerating very minimal po intake.  - GI re-consulted.   - pt unable to tolerate food for gastric emptying study.   - plan to start Carafate as per GI recs as symptoms could be bile reflux.   - If no improvement is noted then plan for EGD and PEG placement on Monday 1/7/19 per GI.

## 2019-01-04 NOTE — ASSESSMENT & PLAN NOTE
-Repeat US on 12/28 resulted as slight improvement, the main hepatic artery remains elevated and the resistive indices have minimally improved, findings suggestive of hepatic artery stenosis with new left perihepatic fluid collection small focal lesion within the right hepatic lobe, measuring 1.2 cm, compatible with a hepatic cyst versus fluid collection.  -repeat US on 1/11/19 per LTS

## 2019-01-04 NOTE — SUBJECTIVE & OBJECTIVE
Interval History 1/4/2019:  Patient is seen for follow-up rehab evaluation and recommendations: [place] tube still in place. EGD pending for today per patient.    HPI, Past Medical, Family, and Social History remains the same as documented in the initial encounter.    Scheduled Medications:    aspirin  81 mg Oral Daily    dronabinol  2.5 mg Oral BID    epoetin sherlyn (PROCRIT) injection  50 Units/kg (Dosing Weight) Intravenous Every Mon, Wed, Fri    ergocalciferol  50,000 Units Oral Q7 Days    escitalopram oxalate  20 mg Oral Daily    fluconazole 40 mg/ml  200 mg Oral Daily    heparin (porcine)  5,000 Units Subcutaneous Q8H    lidocaine  1 patch Transdermal Q24H    metoprolol  12.5 mg Per NG tube BID    mycophenolate mofetil  500 mg Oral BID    pantoprazole  40 mg Intravenous BID    sucralfate  1 g Oral Q8H    sulfamethoxazole-trimethoprim 400-80mg  1 tablet Oral Daily    tacrolimus  2 mg Oral BID    valganciclovir 50 mg/ml  450 mg Oral Daily       Diagnostic Results: Labs: Reviewed    PRN Medications: acetaminophen, albuterol-ipratropium, aluminum & magnesium hydroxide-simethicone, artificial tears, bacitracin, bisacodyl, dextrose 50%, dextrose 50%, glucagon (human recombinant), glucose, glucose, heparin (porcine), insulin aspart U-100, ondansetron, tiZANidine, traMADol    Review of Systems   Constitutional: Positive for activity change, appetite change and fatigue (improving).   HENT: Positive for trouble swallowing. Negative for voice change.    Respiratory: Negative for cough and shortness of breath.    Cardiovascular: Negative for chest pain and palpitations.   Gastrointestinal: Negative for nausea and vomiting.   Musculoskeletal: Positive for gait problem. Negative for arthralgias.   Skin: Positive for wound (surgical). Negative for color change.   Neurological: Positive for dizziness and weakness.     Objective:     Vital Signs (Most Recent):  Temp: 98.5 °F (36.9 °C) (01/03/19 1939)  Pulse: 87  (01/04/19 0958)  Resp: (!) 26 (01/04/19 0958)  BP: (!) 132/91 (01/04/19 0715)  SpO2: 99 % (01/04/19 0958)    Vital Signs (24h Range):  Temp:  [97.9 °F (36.6 °C)-98.5 °F (36.9 °C)] 98.5 °F (36.9 °C)  Pulse:  [82-96] 87  Resp:  [26-34] 26  SpO2:  [96 %-99 %] 99 %  BP: (122-132)/(85-91) 132/91     Physical Exam   Constitutional: He is oriented to person, place, and time. He appears well-developed.   HENT:   Head: Normocephalic and atraumatic.   Eyes: Right eye exhibits no discharge. Left eye exhibits no discharge. No scleral icterus.   Neck: Neck supple.   Cardiovascular: Normal rate and intact distal pulses.   Pulmonary/Chest: Effort normal. No respiratory distress.   Abdominal: Soft. He exhibits no distension.   [place] tube in place   Musculoskeletal: He exhibits no edema or deformity.   RUE: 5/5.  LUE: 5/5.  RLE: 3/5.  LLE: 3/5.   Neurological: He is alert and oriented to person, place, and time. No sensory deficit.        Skin: Skin is warm and dry.   Psychiatric: He has a normal mood and affect. His behavior is normal.     NEUROLOGICAL EXAMINATION:     MENTAL STATUS   Oriented to person, place, and time.

## 2019-01-04 NOTE — PROGRESS NOTES
[facility]  Liver Transplant  Progress Note    Patient Name: Femi Enciso  MRN: [id number]  Admission Date: 10/27/2018  Hospital Length of Stay: 69 days  Code Status: Full Code  Primary Care Provider: Primary Doctor No  Post-Operative Day: 54    ORGAN:   LIVER  Disease Etiology: Alcoholic Cirrhosis  Donor Type:    - Brain Death  CDC High Risk:   No  Donor CMV Status:   Donor CMV Status: Positive  Donor HBcAB:   Negative  Donor HCV Status:   Negative  Donor HBV JAVIER: Negative  Donor HCV JAVIER: Negative  Whole or Partial: Whole Liver  Biliary Anastomosis: End to End  Arterial Anatomy: Standard  Subjective:     History of Present Illness:  Femi Enciso is a 53yr old male with PMH of acute ETOH hepatitis and DEL/ATN evolved to ESRD requiring HD (not candidate for KTX). He is now s/p liver transplant (SM induction, DBD, CMV D+/R-). Transplant surgery noted severe collateralization, adrenal gland firmly adhered to liver. Bare area of adrenal gland required several stitches and packing to obtain fair hemostasis (EBL 15L). OR take back  for bleeding from R adrenal bed in AM (significant clot in retroperitoneum, posterior to R hepatic lobe, tract posterior to the R kidney containing significant clot, small bleeding area of retroperitoneal fat) then returned to surgery same day for hemorrhaging closed with wound vac with plans to return to OR 24-48 hours for closure (retroperitoneal /retrorenal/retrocolic hematoma on the right ). Raw retroperitoneal bleeding. Suture ligatures placed, argon beam cautery, evarest placed in retroperitoneum behind cava and right kidney. Significant oozing from upper right adrenal gland, not amenable to ligation, that portion of the adrenal gland was resected with cautery). OR take back  for washout and incisional closure, no significant bleeding or hematoma found. OR cultures   from body fluid grew enterococcus faecium VRE. ID was consulted,  started on  daptomycin for VRE treatment and cefepime/flagyl to cover for IA ty in bile. Patient with significant leukocytosis with peak on 11/22 at 48K prompting drainage of R subphrenic fluid collection, perc drain placed, culture grew VRE. Stroke code called 11/21 for lethargy and unresponsive, CT head without evidence of acute ischemic changes and CTA chest negative, vascular neurology was consulted recommended MRI brain, but patient's AMS improved shortly after event. Nephrology consulted for ESRD requiring HD. Patient overall improved on antibiotic regimen, leukocytosis and AMS improved. He was transferred to TSU on POD #15.     Hospital Course:  Pt c/o CP 12/21. EKG stable from prior. Troponin wnl. CP resolved 12/22. CXR 12/20 showed no detrimental change. Pt hypertensive prior to HD.  Dialysis tolerated well 12/21 with 1.5L taken off. Bps much improved after dialysis, but monitoring closely.    Interval History: no acute events overnight. Pt reports feeling well this am. He was unable to perform gastric emptying study. He conts with persistent n/v and inability to take PO. S/w GI. Will plan to start Carafate as per GI recs and reassess pt on Monday. If pt remains with persistent n/v and similar symptoms then plan for EGD w/ PEG tube placement on Monday. Continue TFs for now via prabhakar tube. Marinol started 1/3/19. D/c'd remeron. SOB and chest pain improved with additional lasix dose 1/2. Respirations non-labored. 02 sats remain stable on RA. Cr level stable. H&H low and will transfuse one unit of PRBCs for replacement. No HD since 12/26. Continue aggressive PT daily.     Scheduled Meds:   aspirin  81 mg Oral Daily    dronabinol  2.5 mg Oral BID    epoetin sherlyn (PROCRIT) injection  50 Units/kg (Dosing Weight) Intravenous Every Mon, Wed, Fri    ergocalciferol  50,000 Units Oral Q7 Days    escitalopram oxalate  20 mg Oral Daily    fluconazole 40 mg/ml  200 mg Oral Daily    furosemide  40 mg Intravenous Once     heparin (porcine)  5,000 Units Subcutaneous Q8H    lidocaine  1 patch Transdermal Q24H    metoprolol  12.5 mg Per NG tube BID    mycophenolate mofetil  500 mg Oral BID    pantoprazole  40 mg Intravenous BID    sucralfate  1 g Oral Q8H    sulfamethoxazole-trimethoprim 400-80mg  1 tablet Oral Daily    tacrolimus  2 mg Oral BID    valganciclovir 50 mg/ml  450 mg Oral Daily     Continuous Infusions:  PRN Meds:sodium chloride, acetaminophen, albuterol-ipratropium, aluminum & magnesium hydroxide-simethicone, artificial tears, bacitracin, bisacodyl, dextrose 50%, dextrose 50%, glucagon (human recombinant), glucose, glucose, heparin (porcine), insulin aspart U-100, ondansetron, tiZANidine, traMADol    Review of Systems   Constitutional: Positive for activity change and appetite change (decreased). Negative for chills, fatigue and fever.   HENT: Negative for congestion, facial swelling, sore throat and voice change.    Eyes: Negative for pain, discharge and visual disturbance.   Respiratory: Negative for apnea, cough, choking, chest tightness, shortness of breath and wheezing.    Cardiovascular: Negative for chest pain, palpitations and leg swelling.   Gastrointestinal: Positive for nausea. Negative for abdominal distention, abdominal pain, constipation, diarrhea and vomiting.   Endocrine: Negative.    Genitourinary: Negative for difficulty urinating, dysuria, hematuria and urgency.   Musculoskeletal: Negative.  Negative for back pain, gait problem, neck pain and neck stiffness.   Skin: Positive for wound. Negative for color change and pallor.   Allergic/Immunologic: Positive for immunocompromised state.   Neurological: Positive for weakness. Negative for dizziness, seizures, light-headedness and headaches.   Hematological: Negative.    Psychiatric/Behavioral: Negative for behavioral problems, confusion, decreased concentration, dysphoric mood, hallucinations, sleep disturbance and suicidal ideas. The patient is not  nervous/anxious.      Objective:     Vital Signs (Most Recent):  Temp: 98 °F (36.7 °C) (01/04/19 1457)  Pulse: 85 (01/04/19 1336)  Resp: (!) 27 (01/04/19 1336)  BP: (!) 139/91 (01/04/19 1300)  SpO2: 98 % (01/04/19 1336) Vital Signs (24h Range):  Temp:  [97.9 °F (36.6 °C)-98.5 °F (36.9 °C)] 98 °F (36.7 °C)  Pulse:  [82-87] 85  Resp:  [26-34] 27  SpO2:  [96 %-99 %] 98 %  BP: (122-139)/(85-91) 139/91     Weight: 67.2 kg (148 lb 2.4 oz)  Body mass index is 21.26 kg/m².    Intake/Output - Last 3 Shifts       01/02 0700 - 01/03 0659 01/03 0700 - 01/04 0659 01/04 0700 - 01/05 0659    P.O. 540 300     I.V. (mL/kg)  100 (1.5)     NG/ 780 60    Total Intake(mL/kg) 1490 (21.6) 1180 (17.6) 60 (0.9)    Urine (mL/kg/hr) 1325 (0.8) 725 (0.4) 150 (0.3)    Stool       Total Output 1325 725 150    Net +165 +455 -90           Urine Occurrence  1 x           Physical Exam   Constitutional: He is oriented to person, place, and time. He appears well-developed. No distress.   Temporal and distal extremity muscle wasting noted.   HENT:   Head: Normocephalic and atraumatic.   Mouth/Throat: Oropharynx is clear and moist. No oropharyngeal exudate.   Eyes: EOM are normal.   Neck: Normal range of motion. Neck supple. No JVD present.   Cardiovascular: Normal rate, regular rhythm, normal heart sounds and intact distal pulses.   No murmur heard.  Pulmonary/Chest: Effort normal. No respiratory distress. He has decreased breath sounds in the right lower field and the left lower field. He has no wheezes. He exhibits no tenderness.   Abdominal: Soft. Bowel sounds are normal. He exhibits no distension. There is no tenderness.   Chevron inc clean, dry, intact with steri strips in place     Musculoskeletal: Normal range of motion. He exhibits no edema or tenderness.   Neurological: He is alert and oriented to person, place, and time. He has normal reflexes.   Skin: Skin is warm and dry. Capillary refill takes 2 to 3 seconds. He is not diaphoretic.  No pallor.   Psychiatric: He has a normal mood and affect. His behavior is normal. Thought content normal. His affect is not labile. He is not slowed. Cognition and memory are not impaired.   Nursing note and vitals reviewed.      Laboratory:  Immunosuppressants         Stop Route Frequency     tacrolimus capsule 2 mg      -- Oral 2 times daily     mycophenolate mofetil 200 mg/mL suspension 500 mg      -- Oral 2 times daily        CBC:   Recent Labs   Lab 01/04/19  0655   WBC 8.75   RBC 2.67*   HGB 7.4*   HCT 24.3*   *   MCV 91   MCH 27.7   MCHC 30.5*     CMP:   Recent Labs   Lab 01/04/19  0655   *   CALCIUM 9.4   ALBUMIN 2.4*   PROT 6.4      K 4.0   CO2 25      BUN 62*   CREATININE 1.4   ALKPHOS 179*   ALT 12   AST 13   BILITOT 0.9     Labs within the past 24 hours have been reviewed.    Diagnostic Results:  I have personally reviewed all pertinent imaging studies.    Assessment/Plan:     * S/P liver transplant    - LFTs now improving slowly.  T bili was 37.6 day of transplant.  - Drain placed during take back. Drain placed to intra-abdominal abscess on 11/22.   - Fluid from collection noted with enterococcus VRE completed Zyvox treatment.  - Routine 1 month Liver US 12/17 which showed elevated velocity of hepatic artery and low RIs.   - Vascular Surgery consulted. Recommend repeat US in 10-14 days (12/27-12/31) to reassess. Appreciate Vascular recs.  LFTs stable.  - Liver US 12/28 to reassess HAS revealed elevated but slightly improved main hepatic artery velocity and mildly improved RIs.   - Will repeat US in 2 weeks (1/11/19).        Weakness    - Pt remains significantly debilitated.   - PT/OT for strengthening.   - Currently will need SNF for placement and further rehabilitation.   - Pt remains severely debilitated and not strong enough for SNF at this time.    - SNF consult placed 12/17. However, denied for SNF again as not strong enough at this time.  - Rehab consulted.  We now have  a contract w [facility] for rehab.  Rehab willing to accept patient when medically appropriate.    - Continue aggressive therapy.     Acute renal failure with acute tubular necrosis superimposed on stage 3 chronic kidney disease    - 2 weeks for DEL prior to transplant.  - Renal function slow to recover post-op.   - Nephrology consulted for management.   - Cont with HD as per nephrology recs.  Apprec recs.    - Lasix 160 mg challenge 11/28 w/ minimal output.    - HD MWF. Tolerated well 12/26.    - Held HD on 12/28.   - Strict I&Os.   - Albumin 25% x 3 doses 12/28.  - Renal function continuing to improve. Creatinine decreasing with increasing UOP.   - Has not required HD since Wednesday 12/26.   - Plan for one dose of lasix today post blood transfusion.      Anemia of chronic disease    - Last liver US 11/27. Evolving peritransplant hematomas with anechoic fluid collection adjacent to the right hepatic lobe.  Small volume perihepatic free fluid.  - Chest/Abd/pelvis CT 12/4 - no fluid to be drainged per transplant surgeon.  No source of bleeding.    - H&H slightly low this am and will plan for one unit of PRBCs for replacement. Monitor.       At risk for opportunistic infections    - Cont with bactrim and valcyte for protection against opportunistic infections.   - Cont fluconazole for fungal prophylaxis.     Long-term use of immunosuppressant medication    - Cont with prograf. Draw prograf level daily and adjust dose as needed to maintain a therapeutic level.   - restarted MMF on 12/29.       Prophylactic immunotherapy    - See long term immunosuppression.        Type 2 diabetes mellitus without complication    - Endocrine consulted for management. Appreciate recs.   - Hypoglycemia 12/10 requiring D50.    - Repeat cx 12/11 - no growth.  - Blood glucose readings well controlled on tube feeds.     Dyspnea    - pt with sob and chest discomfort 1/1/19.  - RA sats %.  - chest xray showed moderate edema.  - cardiac enzymes  "and ekg negative on 1/1 and 1/2.  - Lasix 60 mg IV given x 2 (last dose on 1/2) with appropriate response.  - ABGs stable. Reviewed with staff.   - Resp status overall improved now.      Other dysphagia    - pt reports difficulty swallowing pills.  - SLP consult, appreciate recs.  - switched PO meds that pt is unable to swallow to liquid/IV.  - Dysphagia has since improved but pt now with persistent nausea limiting PO intake.        Hypokalemia    - Continue to monitor and replace as needed.     Acute kidney failure with lesion of tubular necrosis    - Previously on Dialysis MWF. On Hold for now as kidneys recovering.  - Last HD 12/26/18.  - Good UOP.  - Nephrology following, appreciate recs.  - Monitor.     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       Hypomagnesemia    - Replace Mag PRN.      Severe malnutrition    - Dietician following. Severe temporal, clavicle muscle depletion noted. Severe subq fat loss.  - Nutrition has been poor over the past 24 hours.   - Discussed at length with patient and caregivers that if patient's nutrition status does not improve, will need supplemental nutrition via tube feeds or TPN.  - Appetite stimulant started 12/19.   - Caregivers to bring in outside food for patient.   - Continue calorie count.  - EGD Monday 12/24 as pt c/o poor appetite + burning in chest/esophagus and occasionally vomiting after eating x several weeks.   - EGD 12/24 unremarkable. prabhakar tube placed afterwards.   - TF started for nutrition 12/24/18. TF at goal rate, 50 ml/hr.  - Prealbumin 7 on 12/26. Repeat 9 on 1/3.  - Started scheduled remeron 12/31.   - dc remeron and start marinol 1/3.  - Cont to watch pt on marinol at this time.      Nausea    - Intermittent, worse over past 10 days,  Changed Pepcid to Protonix 12/9/18.  Appetite seems to be slowly improving.    - Encourage multiple, small meals and cont PRN anti-emetics.    - GI consulted.  EGD 12/24 w " normal esophagus, erythematous mucosa in the antrum and nodular mucosa in the duodenal bulb w biopsies.  Path pending.  - JHON placed 12/24.  Pt tolerating tube feeds.  Denies nausea w TF.    - Pt only tolerating very minimal po intake.  - GI re-consulted.   - pt unable to tolerate food for gastric emptying study.   - plan to start Carafate as per GI recs as symptoms could be bile reflux.   - If no improvement is noted then plan for EGD and PEG placement on Monday 1/7/19 per GI.        Anxiety    - Restarted Lexapro as per psych recs, 12/13.   - Monitor.     Prolonged Q-T interval on ECG    -  ms on EKG 12/19.  -  on EKG 12/21.  - Monitor.       Alcohol use disorder, severe, in early remission, dependence    - Monitor.       Decreased appetite    - Continue appetite stimulant.  - GI consulted as pt c/o burning sensation in chest/esophagus + vomiting after eating, passed SLP eval, recommended GI f/u.  - Cont to encourage PO intake. Ordered additional supplements.   - EGD 12/24, unremarkable. jhon tube placed and TF started. Increased TF to 45 ml, new goal rate 50 ml/hr.  - Prealbumin 7 on 12/26. Repeat prealbumin 9 on 1/3.  - started remeron 12/31.   - dc remeron and start marinol 1/3/19.     Overweight (BMI 25.0-29.9)           Adrenal cortical steroids causing adverse effect in therapeutic use    - Monitor.       Pleural effusion associated with hepatic disorder    - c/o increased pain w deep breath today to right side.  CXR showed increased opacity in the inferior hemothorax on the right side since 11/26/2018.  Pulse ox 97-99% on RA.  Cont to monitor.   - continues to have fluid to RLL.  WBC remains elevated.    - thoracentesis performed on 11/30. Fluid noted with 4k WBC, 93 SEGS. cx no growth to date.  Cefepime added for treatment.  - Chest CT showing right pleural effusion improved, left w increased pleural effusion.   - Per ID, completed treatment of empyema w Cefepime thru 12/8.  - sats remain  97-99% on RA.  - CXR 12/20 showed left hemidiaphragmatic margin that is better delineated than on 12/19/2018, consistent with improved aeration in the left lower lobe. + no significant detrimental interval change in the appearance of the chest, with the current examination demonstrating a slightly improved inspiratory depth level.    - denies SOB.  Last CXR 12/22 w improvement.  - pt with dyspnea starting 1/1/19. RA sats %. Chest xray showed moderated edema.   - Lasix 60 mg IV on 1/1 and 1/2/19.   - now improved.         VTE Risk Mitigation (From admission, onward)        Ordered     heparin (porcine) injection 5,000 Units  Every 8 hours      12/24/18 0742     heparin (porcine) injection 1,000 Units  As needed (PRN)      12/12/18 1731     IP VTE HIGH RISK PATIENT  Once      11/11/18 1208          The patients clinical status was discussed at multidisplinary rounds, involving transplant surgery, transplant medicine, pharmacy, nursing, nutrition, and social work    Discharge Planning:  Not a candidate for d/c at this time.     Kevin Miranda, NP  Liver Transplant  [facility]

## 2019-01-05 PROBLEM — M54.9 BACK PAIN, CHRONIC: Status: ACTIVE | Noted: 2019-01-05

## 2019-01-05 PROBLEM — G89.29 BACK PAIN, CHRONIC: Status: ACTIVE | Noted: 2019-01-05

## 2019-01-05 LAB
ALBUMIN SERPL BCP-MCNC: 2.4 G/DL
ALP SERPL-CCNC: 177 U/L
ALT SERPL W/O P-5'-P-CCNC: 13 U/L
ANION GAP SERPL CALC-SCNC: 10 MMOL/L
AST SERPL-CCNC: 14 U/L
BASOPHILS # BLD AUTO: 0.31 K/UL
BASOPHILS NFR BLD: 4 %
BILIRUB SERPL-MCNC: 0.7 MG/DL
BUN SERPL-MCNC: 58 MG/DL
CALCIUM SERPL-MCNC: 9.1 MG/DL
CHLORIDE SERPL-SCNC: 103 MMOL/L
CO2 SERPL-SCNC: 29 MMOL/L
CREAT SERPL-MCNC: 1.4 MG/DL
DIFFERENTIAL METHOD: ABNORMAL
EOSINOPHIL # BLD AUTO: 0.6 K/UL
EOSINOPHIL NFR BLD: 7.4 %
ERYTHROCYTE [DISTWIDTH] IN BLOOD BY AUTOMATED COUNT: 15 %
EST. GFR  (AFRICAN AMERICAN): >60 ML/MIN/1.73 M^2
EST. GFR  (NON AFRICAN AMERICAN): 57 ML/MIN/1.73 M^2
GLUCOSE SERPL-MCNC: 177 MG/DL
HCT VFR BLD AUTO: 27.1 %
HGB BLD-MCNC: 8.3 G/DL
IMM GRANULOCYTES # BLD AUTO: 0.08 K/UL
IMM GRANULOCYTES NFR BLD AUTO: 1 %
LYMPHOCYTES # BLD AUTO: 1 K/UL
LYMPHOCYTES NFR BLD: 12.8 %
MAGNESIUM SERPL-MCNC: 1.7 MG/DL
MCH RBC QN AUTO: 28.2 PG
MCHC RBC AUTO-ENTMCNC: 30.6 G/DL
MCV RBC AUTO: 92 FL
MONOCYTES # BLD AUTO: 0.6 K/UL
MONOCYTES NFR BLD: 7.9 %
NEUTROPHILS # BLD AUTO: 5.2 K/UL
NEUTROPHILS NFR BLD: 66.9 %
NRBC BLD-RTO: 0 /100 WBC
PHOSPHATE SERPL-MCNC: 4.5 MG/DL
PLATELET # BLD AUTO: 412 K/UL
PMV BLD AUTO: 11.5 FL
POCT GLUCOSE: 129 MG/DL (ref 70–110)
POCT GLUCOSE: 154 MG/DL (ref 70–110)
POCT GLUCOSE: 159 MG/DL (ref 70–110)
POCT GLUCOSE: 163 MG/DL (ref 70–110)
POTASSIUM SERPL-SCNC: 3.7 MMOL/L
PROT SERPL-MCNC: 6.4 G/DL
RBC # BLD AUTO: 2.94 M/UL
SODIUM SERPL-SCNC: 142 MMOL/L
TACROLIMUS BLD-MCNC: 6.6 NG/ML
WBC # BLD AUTO: 7.75 K/UL

## 2019-01-05 PROCEDURE — 85025 COMPLETE CBC W/AUTO DIFF WBC: CPT

## 2019-01-05 PROCEDURE — C9113 INJ PANTOPRAZOLE SODIUM, VIA: HCPCS | Performed by: NURSE PRACTITIONER

## 2019-01-05 PROCEDURE — 63600175 PHARM REV CODE 636 W HCPCS: Performed by: PHYSICIAN ASSISTANT

## 2019-01-05 PROCEDURE — 25000003 PHARM REV CODE 250: Performed by: NURSE PRACTITIONER

## 2019-01-05 PROCEDURE — 25000003 PHARM REV CODE 250: Performed by: STUDENT IN AN ORGANIZED HEALTH CARE EDUCATION/TRAINING PROGRAM

## 2019-01-05 PROCEDURE — 99233 SBSQ HOSP IP/OBS HIGH 50: CPT | Mod: 24,,, | Performed by: NURSE PRACTITIONER

## 2019-01-05 PROCEDURE — 84100 ASSAY OF PHOSPHORUS: CPT

## 2019-01-05 PROCEDURE — 20600001 HC STEP DOWN PRIVATE ROOM

## 2019-01-05 PROCEDURE — 99900035 HC TECH TIME PER 15 MIN (STAT)

## 2019-01-05 PROCEDURE — 83735 ASSAY OF MAGNESIUM: CPT

## 2019-01-05 PROCEDURE — 63600175 PHARM REV CODE 636 W HCPCS: Performed by: NURSE PRACTITIONER

## 2019-01-05 PROCEDURE — 94664 DEMO&/EVAL PT USE INHALER: CPT

## 2019-01-05 PROCEDURE — 80197 ASSAY OF TACROLIMUS: CPT

## 2019-01-05 PROCEDURE — 99233 PR SUBSEQUENT HOSPITAL CARE,LEVL III: ICD-10-PCS | Mod: 24,,, | Performed by: NURSE PRACTITIONER

## 2019-01-05 PROCEDURE — 80053 COMPREHEN METABOLIC PANEL: CPT

## 2019-01-05 PROCEDURE — 25000003 PHARM REV CODE 250: Performed by: PHYSICIAN ASSISTANT

## 2019-01-05 PROCEDURE — 94761 N-INVAS EAR/PLS OXIMETRY MLT: CPT

## 2019-01-05 RX ORDER — GABAPENTIN 300 MG/1
300 CAPSULE ORAL DAILY
Status: DISCONTINUED | OUTPATIENT
Start: 2019-01-05 | End: 2019-01-08 | Stop reason: HOSPADM

## 2019-01-05 RX ORDER — CYCLOBENZAPRINE HCL 5 MG
5 TABLET ORAL 3 TIMES DAILY PRN
Status: DISCONTINUED | OUTPATIENT
Start: 2019-01-05 | End: 2019-01-08 | Stop reason: HOSPADM

## 2019-01-05 RX ADMIN — SUCRALFATE 1 G: 1 SUSPENSION ORAL at 09:01

## 2019-01-05 RX ADMIN — ASPIRIN 81 MG CHEWABLE TABLET 81 MG: 81 TABLET CHEWABLE at 09:01

## 2019-01-05 RX ADMIN — TACROLIMUS 2 MG: 1 CAPSULE ORAL at 05:01

## 2019-01-05 RX ADMIN — TIZANIDINE 2 MG: 2 TABLET ORAL at 03:01

## 2019-01-05 RX ADMIN — GABAPENTIN 300 MG: 300 CAPSULE ORAL at 10:01

## 2019-01-05 RX ADMIN — DRONABINOL 2.5 MG: 2.5 CAPSULE ORAL at 09:01

## 2019-01-05 RX ADMIN — SUCRALFATE 1 G: 1 SUSPENSION ORAL at 03:01

## 2019-01-05 RX ADMIN — Medication 500 MG: at 09:01

## 2019-01-05 RX ADMIN — TACROLIMUS 2 MG: 1 CAPSULE ORAL at 09:01

## 2019-01-05 RX ADMIN — FLUCONAZOLE 200 MG: 40 POWDER, FOR SUSPENSION ORAL at 11:01

## 2019-01-05 RX ADMIN — Medication 12.5 MG: at 09:01

## 2019-01-05 RX ADMIN — MORPHINE 450 MG: 10 SOLUTION ORAL at 11:01

## 2019-01-05 RX ADMIN — ESCITALOPRAM OXALATE 20 MG: 5 SOLUTION ORAL at 09:01

## 2019-01-05 RX ADMIN — SUCRALFATE 1 G: 1 SUSPENSION ORAL at 06:01

## 2019-01-05 RX ADMIN — CYCLOBENZAPRINE HYDROCHLORIDE 5 MG: 5 TABLET, FILM COATED ORAL at 06:01

## 2019-01-05 RX ADMIN — HEPARIN SODIUM 5000 UNITS: 5000 INJECTION, SOLUTION INTRAVENOUS; SUBCUTANEOUS at 09:01

## 2019-01-05 RX ADMIN — HEPARIN SODIUM 5000 UNITS: 5000 INJECTION, SOLUTION INTRAVENOUS; SUBCUTANEOUS at 06:01

## 2019-01-05 RX ADMIN — TIZANIDINE 2 MG: 2 TABLET ORAL at 01:01

## 2019-01-05 RX ADMIN — PANTOPRAZOLE SODIUM 40 MG: 40 INJECTION, POWDER, LYOPHILIZED, FOR SOLUTION INTRAVENOUS at 09:01

## 2019-01-05 RX ADMIN — HEPARIN SODIUM 5000 UNITS: 5000 INJECTION, SOLUTION INTRAVENOUS; SUBCUTANEOUS at 03:01

## 2019-01-05 RX ADMIN — SULFAMETHOXAZOLE AND TRIMETHOPRIM 1 TABLET: 400; 80 TABLET ORAL at 09:01

## 2019-01-05 RX ADMIN — Medication 12.5 MG: at 11:01

## 2019-01-05 NOTE — ASSESSMENT & PLAN NOTE
- Continue appetite stimulant.  - GI consulted as pt c/o burning sensation in chest/esophagus + vomiting after eating, passed SLP eval, recommended GI f/u.  - Cont to encourage PO intake. Ordered additional supplements.   - EGD 12/24, unremarkable. prabhakar tube placed and TF started. Increased TF to 45 ml, new goal rate 50 ml/hr.  - Prealbumin 7 on 12/26. Repeat prealbumin 9 on 1/3.  - started remeron 12/31.   - dc remeron and start marinol 1/3/19.   - tolerated kit keri and lenny's peanut butter cup. Encourage PO intake.

## 2019-01-05 NOTE — ASSESSMENT & PLAN NOTE
- pt reports difficulty swallowing pills.  - SLP consult, appreciate recs.  - switched PO meds that pt is unable to swallow to liquid/IV.  - Dysphagia has since improved but pt now with persistent nausea limiting PO intake.   - no mechanical deficit noted.

## 2019-01-05 NOTE — SUBJECTIVE & OBJECTIVE
Scheduled Meds:   aspirin  81 mg Oral Daily    dronabinol  2.5 mg Oral BID    epoetin sherlyn (PROCRIT) injection  50 Units/kg (Dosing Weight) Intravenous Every Mon, Wed, Fri    ergocalciferol  50,000 Units Oral Q7 Days    escitalopram oxalate  20 mg Oral Daily    fluconazole 40 mg/ml  200 mg Oral Daily    heparin (porcine)  5,000 Units Subcutaneous Q8H    lidocaine  1 patch Transdermal Q24H    metoprolol  12.5 mg Per NG tube BID    mycophenolate mofetil  500 mg Oral BID    pantoprazole  40 mg Intravenous BID    sucralfate  1 g Oral Q8H    sulfamethoxazole-trimethoprim 400-80mg  1 tablet Oral Daily    tacrolimus  2 mg Oral BID    valganciclovir 50 mg/ml  450 mg Oral Daily     Continuous Infusions:  PRN Meds:sodium chloride, acetaminophen, albuterol-ipratropium, aluminum & magnesium hydroxide-simethicone, artificial tears, bacitracin, bisacodyl, cyclobenzaprine, dextrose 50%, dextrose 50%, glucagon (human recombinant), glucose, glucose, heparin (porcine), insulin aspart U-100, ondansetron, traMADol    Review of Systems   Constitutional: Positive for activity change and appetite change (decreased). Negative for chills, fatigue and fever.   HENT: Negative for congestion, facial swelling, sore throat and voice change.    Eyes: Negative for pain, discharge and visual disturbance.   Respiratory: Negative for apnea, cough, choking, chest tightness, shortness of breath and wheezing.    Cardiovascular: Negative for chest pain, palpitations and leg swelling.   Gastrointestinal: Positive for nausea. Negative for abdominal distention, abdominal pain, constipation, diarrhea and vomiting.   Endocrine: Negative.    Genitourinary: Negative for difficulty urinating, dysuria, hematuria and urgency.   Musculoskeletal: Negative.  Negative for back pain, gait problem, neck pain and neck stiffness.   Skin: Positive for wound. Negative for color change and pallor.   Allergic/Immunologic: Positive for immunocompromised state.    Neurological: Positive for weakness. Negative for dizziness, seizures, light-headedness and headaches.   Hematological: Negative.    Psychiatric/Behavioral: Negative for behavioral problems, confusion, decreased concentration, dysphoric mood, hallucinations, sleep disturbance and suicidal ideas. The patient is not nervous/anxious.      Objective:     Vital Signs (Most Recent):  Temp: 98.5 °F (36.9 °C) (01/05/19 1128)  Pulse: 86 (01/05/19 1128)  Resp: (!) 37 (01/05/19 1128)  BP: (!) 153/94 (01/05/19 1231)  SpO2: 98 % (01/05/19 1128) Vital Signs (24h Range):  Temp:  [97.8 °F (36.6 °C)-98.5 °F (36.9 °C)] 98.5 °F (36.9 °C)  Pulse:  [78-93] 86  Resp:  [20-37] 37  SpO2:  [97 %-99 %] 98 %  BP: (149-161)/() 153/94     Weight: 68.2 kg (150 lb 5.7 oz)  Body mass index is 21.57 kg/m².    Intake/Output - Last 3 Shifts       01/03 0700 - 01/04 0659 01/04 0700 - 01/05 0659 01/05 0700 - 01/06 0659    P.O. 300 780 100    I.V. (mL/kg) 100 (1.5)      Blood  446.7     NG/ 780     Total Intake(mL/kg) 1180 (17.6) 2006.7 (29.4) 100 (1.5)    Urine (mL/kg/hr) 725 (0.4) 1150 (0.7)     Stool  0     Total Output 725 1150     Net +455 +856.7 +100           Urine Occurrence 1 x  1 x    Stool Occurrence  0 x 1 x          Physical Exam   Constitutional: He is oriented to person, place, and time. He appears well-developed. No distress.   Temporal and distal extremity muscle wasting noted.   HENT:   Head: Normocephalic and atraumatic.   Mouth/Throat: Oropharynx is clear and moist. No oropharyngeal exudate.   Eyes: EOM are normal.   Neck: Normal range of motion. Neck supple. No JVD present.   Cardiovascular: Normal rate, regular rhythm, normal heart sounds and intact distal pulses.   No murmur heard.  Pulmonary/Chest: Effort normal. No respiratory distress. He has decreased breath sounds in the right lower field and the left lower field. He has no wheezes. He exhibits no tenderness.   Abdominal: Soft. Bowel sounds are normal. He  exhibits no distension. There is no tenderness.   Chevron inc clean, dry, intact with steri strips in place     Musculoskeletal: Normal range of motion. He exhibits no edema or tenderness.   Neurological: He is alert and oriented to person, place, and time. He has normal reflexes.   Skin: Skin is warm and dry. Capillary refill takes 2 to 3 seconds. He is not diaphoretic. No pallor.   Psychiatric: He has a normal mood and affect. His behavior is normal. Thought content normal. His affect is not labile. He is not slowed. Cognition and memory are not impaired.   Nursing note and vitals reviewed.      Laboratory:  Immunosuppressants         Stop Route Frequency     tacrolimus capsule 2 mg      -- Oral 2 times daily     mycophenolate mofetil 200 mg/mL suspension 500 mg      -- Oral 2 times daily        CBC:   Recent Labs   Lab 01/05/19  0600   WBC 7.75   RBC 2.94*   HGB 8.3*   HCT 27.1*   *   MCV 92   MCH 28.2   MCHC 30.6*     CMP:   Recent Labs   Lab 01/05/19  0600   *   CALCIUM 9.1   ALBUMIN 2.4*   PROT 6.4      K 3.7   CO2 29      BUN 58*   CREATININE 1.4   ALKPHOS 177*   ALT 13   AST 14   BILITOT 0.7     Labs within the past 24 hours have been reviewed.    Diagnostic Results:  I have personally reviewed all pertinent imaging studies.

## 2019-01-05 NOTE — PT/OT/SLP PROGRESS
Occupational Therapy      Patient Name:  Femi Enciso   MRN:  [id number]    Occupational therapy visit attempted in AM. Patient not seen today secondary to Patient unwilling to participate despite max encouragement and education on the importance of OOB activity and participation with therapy.Unable to re-attempt in PM. Will follow-up as scheduled.    Julia ladd OT  1/5/2019

## 2019-01-05 NOTE — ASSESSMENT & PLAN NOTE
- Pt remains significantly debilitated.   - PT/OT for strengthening.   - Currently will need SNF for placement and further rehabilitation.   - Pt remains severely debilitated and not strong enough for SNF at this time.    - SNF consult placed 12/17. However, denied for SNF again as not strong enough at this time.  - Rehab consulted.  We now have a contract w [facility] for rehab.  Rehab willing to accept patient when medically appropriate.    - Continue aggressive therapy.

## 2019-01-05 NOTE — NURSING
01/05/19 1128   Vital Signs   BP (!) 161/100   MAP (mmHg) 130     Pt reporting continuing back pain.  RN will provide ordered PRN muscle relaxer and monitor BP closely.

## 2019-01-05 NOTE — ASSESSMENT & PLAN NOTE
- Last liver US 11/27. Evolving peritransplant hematomas with anechoic fluid collection adjacent to the right hepatic lobe.  Small volume perihepatic free fluid.  - Chest/Abd/pelvis CT 12/4 - no fluid to be drainged per transplant surgeon.  No source of bleeding.    - H&H slightly low 1/4/19.  Transfused w appropriate response.  No evidence of overt bleeding.  Monitor.

## 2019-01-05 NOTE — PLAN OF CARE
"Problem: Patient Care Overview  Goal: Plan of Care Review  Outcome: Ongoing (interventions implemented as appropriate)  Pt AAOx4, VSS, in NAD throughout shift.  Pt positions/repositions himself independently but remains unable to get out of bed on his own.  Pt refused OT this am.  Pt tolerating all medications and interventions.  Pt receiving Novosource Renal tube feeds to prabhakar NG tube at 50 cc/hr, no residual.  RN administered liquid medications through prabhakar tube, pt able to swallow "small pills," RN observed pt swallowing medications in pill form, pt had no issues.  Pt reports attempting to eat and drink in addition to tube feeds, RN observes none.  Pt utilizing bed pan, no incontinence.  Pt reports chronic pain to bilateral lower back, zanaflex provides some relief.  RN encourages pt to get out of bed and sit in chair, pt refuses.  RN maintaining fall risk precautions throughout shift.  Pt denies need at this time; RN will continue to monitor, assess, and alter plan of care as needed until report given to oncoming night shift nurse.       "

## 2019-01-05 NOTE — PROGRESS NOTES
[facility]  Liver Transplant  Progress Note    Patient Name: Femi Enciso  MRN: [id number]  Admission Date: 10/27/2018  Hospital Length of Stay: 70 days  Code Status: Full Code  Primary Care Provider: Primary Doctor No  Post-Operative Day: 55    ORGAN:   LIVER  Disease Etiology: Alcoholic Cirrhosis  Donor Type:    - Brain Death  CDC High Risk:   No  Donor CMV Status:   Donor CMV Status: Positive  Donor HBcAB:   Negative  Donor HCV Status:   Negative  Donor HBV JAVIER: Negative  Donor HCV JAVIER: Negative  Whole or Partial: Whole Liver  Biliary Anastomosis: End to End  Arterial Anatomy: Standard  Subjective:     History of Present Illness:  Femi Enciso is a 53yr old male with PMH of acute ETOH hepatitis and DEL/ATN evolved to ESRD requiring HD (not candidate for KTX). He is now s/p liver transplant (SM induction, DBD, CMV D+/R-). Transplant surgery noted severe collateralization, adrenal gland firmly adhered to liver. Bare area of adrenal gland required several stitches and packing to obtain fair hemostasis (EBL 15L). OR take back  for bleeding from R adrenal bed in AM (significant clot in retroperitoneum, posterior to R hepatic lobe, tract posterior to the R kidney containing significant clot, small bleeding area of retroperitoneal fat) then returned to surgery same day for hemorrhaging closed with wound vac with plans to return to OR 24-48 hours for closure (retroperitoneal /retrorenal/retrocolic hematoma on the right ). Raw retroperitoneal bleeding. Suture ligatures placed, argon beam cautery, evarest placed in retroperitoneum behind cava and right kidney. Significant oozing from upper right adrenal gland, not amenable to ligation, that portion of the adrenal gland was resected with cautery). OR take back  for washout and incisional closure, no significant bleeding or hematoma found. OR cultures   from body fluid grew enterococcus faecium VRE. ID was consulted,  started on  daptomycin for VRE treatment and cefepime/flagyl to cover for IA ty in bile. Patient with significant leukocytosis with peak on 11/22 at 48K prompting drainage of R subphrenic fluid collection, perc drain placed, culture grew VRE. Stroke code called 11/21 for lethargy and unresponsive, CT head without evidence of acute ischemic changes and CTA chest negative, vascular neurology was consulted recommended MRI brain, but patient's AMS improved shortly after event. Nephrology consulted for ESRD requiring HD. Patient overall improved on antibiotic regimen, leukocytosis and AMS improved. He was transferred to TSU on POD #15.     Hospital Course:  Pt c/o CP 12/21. EKG stable from prior. Troponin wnl. CP resolved 12/22. CXR 12/20 showed no detrimental change. Pt hypertensive prior to HD.  Dialysis tolerated well 12/21 with 1.5L taken off. Bps much improved after dialysis, but monitoring closely.    Interval History: no acute events overnight.  Chief complaint- back pain.  Zanaflex changed to Flexeril.  Of note, JHON accidentally removed last night and replaced.  Pt ate 2 kit keri and a lenny's peanut butter cup.  He denies n/v.  Cont TF.  Encouraged pt to cont to try to eat.  Cont Marinol.  Cont Carafate.  Tentative plan for EGD and if no abnormalities, PEG placement.   Cr level stable.  Good UOP.  Last HD 12/26/18.  Responded appropriately to blood transfusion 1/4/19. Continue aggressive PT daily.     Scheduled Meds:   aspirin  81 mg Oral Daily    dronabinol  2.5 mg Oral BID    epoetin sherlyn (PROCRIT) injection  50 Units/kg (Dosing Weight) Intravenous Every Mon, Wed, Fri    ergocalciferol  50,000 Units Oral Q7 Days    escitalopram oxalate  20 mg Oral Daily    fluconazole 40 mg/ml  200 mg Oral Daily    heparin (porcine)  5,000 Units Subcutaneous Q8H    lidocaine  1 patch Transdermal Q24H    metoprolol  12.5 mg Per NG tube BID    mycophenolate mofetil  500 mg Oral BID    pantoprazole  40 mg Intravenous BID     sucralfate  1 g Oral Q8H    sulfamethoxazole-trimethoprim 400-80mg  1 tablet Oral Daily    tacrolimus  2 mg Oral BID    valganciclovir 50 mg/ml  450 mg Oral Daily     Continuous Infusions:  PRN Meds:sodium chloride, acetaminophen, albuterol-ipratropium, aluminum & magnesium hydroxide-simethicone, artificial tears, bacitracin, bisacodyl, cyclobenzaprine, dextrose 50%, dextrose 50%, glucagon (human recombinant), glucose, glucose, heparin (porcine), insulin aspart U-100, ondansetron, traMADol    Review of Systems   Constitutional: Positive for activity change and appetite change (decreased). Negative for chills, fatigue and fever.   HENT: Negative for congestion, facial swelling, sore throat and voice change.    Eyes: Negative for pain, discharge and visual disturbance.   Respiratory: Negative for apnea, cough, choking, chest tightness, shortness of breath and wheezing.    Cardiovascular: Negative for chest pain, palpitations and leg swelling.   Gastrointestinal: Positive for nausea. Negative for abdominal distention, abdominal pain, constipation, diarrhea and vomiting.   Endocrine: Negative.    Genitourinary: Negative for difficulty urinating, dysuria, hematuria and urgency.   Musculoskeletal: Negative.  Negative for back pain, gait problem, neck pain and neck stiffness.   Skin: Positive for wound. Negative for color change and pallor.   Allergic/Immunologic: Positive for immunocompromised state.   Neurological: Positive for weakness. Negative for dizziness, seizures, light-headedness and headaches.   Hematological: Negative.    Psychiatric/Behavioral: Negative for behavioral problems, confusion, decreased concentration, dysphoric mood, hallucinations, sleep disturbance and suicidal ideas. The patient is not nervous/anxious.      Objective:     Vital Signs (Most Recent):  Temp: 98.5 °F (36.9 °C) (01/05/19 1128)  Pulse: 86 (01/05/19 1128)  Resp: (!) 37 (01/05/19 1128)  BP: (!) 153/94 (01/05/19 1231)  SpO2: 98 %  (01/05/19 1128) Vital Signs (24h Range):  Temp:  [97.8 °F (36.6 °C)-98.5 °F (36.9 °C)] 98.5 °F (36.9 °C)  Pulse:  [78-93] 86  Resp:  [20-37] 37  SpO2:  [97 %-99 %] 98 %  BP: (149-161)/() 153/94     Weight: 68.2 kg (150 lb 5.7 oz)  Body mass index is 21.57 kg/m².    Intake/Output - Last 3 Shifts       01/03 0700 - 01/04 0659 01/04 0700 - 01/05 0659 01/05 0700 - 01/06 0659    P.O. 300 780 100    I.V. (mL/kg) 100 (1.5)      Blood  446.7     NG/ 780     Total Intake(mL/kg) 1180 (17.6) 2006.7 (29.4) 100 (1.5)    Urine (mL/kg/hr) 725 (0.4) 1150 (0.7)     Stool  0     Total Output 725 1150     Net +455 +856.7 +100           Urine Occurrence 1 x  1 x    Stool Occurrence  0 x 1 x          Physical Exam   Constitutional: He is oriented to person, place, and time. He appears well-developed. No distress.   Temporal and distal extremity muscle wasting noted.   HENT:   Head: Normocephalic and atraumatic.   Mouth/Throat: Oropharynx is clear and moist. No oropharyngeal exudate.   Eyes: EOM are normal.   Neck: Normal range of motion. Neck supple. No JVD present.   Cardiovascular: Normal rate, regular rhythm, normal heart sounds and intact distal pulses.   No murmur heard.  Pulmonary/Chest: Effort normal. No respiratory distress. He has decreased breath sounds in the right lower field and the left lower field. He has no wheezes. He exhibits no tenderness.   Abdominal: Soft. Bowel sounds are normal. He exhibits no distension. There is no tenderness.   Chevron inc clean, dry, intact with steri strips in place     Musculoskeletal: Normal range of motion. He exhibits no edema or tenderness.   Neurological: He is alert and oriented to person, place, and time. He has normal reflexes.   Skin: Skin is warm and dry. Capillary refill takes 2 to 3 seconds. He is not diaphoretic. No pallor.   Psychiatric: He has a normal mood and affect. His behavior is normal. Thought content normal. His affect is not labile. He is not slowed.  Cognition and memory are not impaired.   Nursing note and vitals reviewed.      Laboratory:  Immunosuppressants         Stop Route Frequency     tacrolimus capsule 2 mg      -- Oral 2 times daily     mycophenolate mofetil 200 mg/mL suspension 500 mg      -- Oral 2 times daily        CBC:   Recent Labs   Lab 01/05/19  0600   WBC 7.75   RBC 2.94*   HGB 8.3*   HCT 27.1*   *   MCV 92   MCH 28.2   MCHC 30.6*     CMP:   Recent Labs   Lab 01/05/19  0600   *   CALCIUM 9.1   ALBUMIN 2.4*   PROT 6.4      K 3.7   CO2 29      BUN 58*   CREATININE 1.4   ALKPHOS 177*   ALT 13   AST 14   BILITOT 0.7     Labs within the past 24 hours have been reviewed.    Diagnostic Results:  I have personally reviewed all pertinent imaging studies.    Assessment/Plan:     * S/P liver transplant    - LFTs now improving slowly.  T bili was 37.6 day of transplant.  - Drain placed during take back. Drain placed to intra-abdominal abscess on 11/22.   - Fluid from collection noted with enterococcus VRE completed Zyvox treatment.  - Routine 1 month Liver US 12/17 which showed elevated velocity of hepatic artery and low RIs.   - Vascular Surgery consulted. Recommend repeat US in 10-14 days (12/27-12/31) to reassess. Appreciate Vascular recs.  LFTs stable.  - Liver US 12/28 to reassess HAS revealed elevated but slightly improved main hepatic artery velocity and mildly improved RIs.   - Will repeat US in 2 weeks (1/11/19).        Back pain, chronic    - chronic.  Zanaflex changed to Flexeril.  Cont Lidocaine patches,  Encourage OOB.         Dyspnea    - pt with sob and chest discomfort 1/1/19.  - RA sats %.  - chest xray showed moderate edema.  - cardiac enzymes and ekg negative on 1/1 and 1/2.  - Lasix 60 mg IV given x 2 (last dose on 1/2) with appropriate response.  - ABGs stable. Reviewed with staff.   - Resp status overall improved now.      Other dysphagia    - pt reports difficulty swallowing pills.  - SLP consult,  "appreciate recs.  - switched PO meds that pt is unable to swallow to liquid/IV.  - Dysphagia has since improved but pt now with persistent nausea limiting PO intake.   - no mechanical deficit noted.     Hypokalemia    - Continue to monitor and replace as needed.     Acute kidney failure with lesion of tubular necrosis    - Previously on Dialysis MWF. On Hold for now as kidneys recovering.  - Last HD 12/26/18.  - Good UOP.  - Nephrology following, appreciate recs.  - Monitor.     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       Hypomagnesemia    - Replace Mag PRN.      Severe malnutrition    - Dietician following. Severe temporal, clavicle muscle depletion noted. Severe subq fat loss.  - Nutrition has been poor over the past 24 hours.   - Discussed at length with patient and caregivers that if patient's nutrition status does not improve, will need supplemental nutrition via tube feeds or TPN.  - Appetite stimulant started 12/19.   - Caregivers to bring in outside food for patient.   - Continue calorie count.  - EGD Monday 12/24 as pt c/o poor appetite + burning in chest/esophagus and occasionally vomiting after eating x several weeks.   - EGD 12/24 unremarkable. jhon tube placed afterwards.   - TF started for nutrition 12/24/18. TF at goal rate, 50 ml/hr.  - Prealbumin 7 on 12/26. Repeat 9 on 1/3.  - Started scheduled remeron 12/31.   - dc remeron and start marinol 1/3.  - Cont to watch pt on marinol at this time.      Nausea    - Intermittent, worse over past 10 days,  Changed Pepcid to Protonix 12/9/18.  Appetite seems to be slowly improving.    - Encourage multiple, small meals and cont PRN anti-emetics.    - GI consulted.  EGD 12/24 w normal esophagus, erythematous mucosa in the antrum and nodular mucosa in the duodenal bulb w biopsies.  Path pending.  - JHON placed 12/24.  Pt tolerating tube feeds.  Denies nausea w TF.    - Pt only tolerating very minimal po " intake.  - GI re-consulted.   - pt unable to tolerate food for gastric emptying study.   - plan to start Carafate as per GI recs as symptoms could be bile reflux.   - If no improvement is noted then plan for EGD and PEG placement on Monday 1/7/19 per GI.        Anxiety    - Restarted Lexapro as per psych recs, 12/13. Pt also on Marinol.    - Monitor.     Prolonged Q-T interval on ECG    -  ms on EKG 12/19.  -  on EKG 12/21.  - Monitor.       Alcohol use disorder, severe, in early remission, dependence    - Monitor.       Decreased appetite    - Continue appetite stimulant.  - GI consulted as pt c/o burning sensation in chest/esophagus + vomiting after eating, passed SLP eval, recommended GI f/u.  - Cont to encourage PO intake. Ordered additional supplements.   - EGD 12/24, unremarkable. prabhakar tube placed and TF started. Increased TF to 45 ml, new goal rate 50 ml/hr.  - Prealbumin 7 on 12/26. Repeat prealbumin 9 on 1/3.  - started remeron 12/31.   - dc remeron and start marinol 1/3/19.   - tolerated kit keri and lenny's peanut butter cup. Encourage PO intake.      Acute renal failure with acute tubular necrosis superimposed on stage 3 chronic kidney disease    - 2 weeks for DEL prior to transplant.  - Renal function slow to recover post-op.   - Nephrology consulted for management.   - Cont with HD as per nephrology recs.  Apprec recs.    - Lasix 160 mg challenge 11/28 w/ minimal output.    - HD MWF. Tolerated well 12/26.    - Held HD on 12/28.   - Strict I&Os.   - Albumin 25% x 3 doses 12/28.  - Renal function continuing to improve. Creatinine decreasing with increasing UOP.   - Has not required HD since Wednesday 12/26.        Long-term use of immunosuppressant medication    - Cont with prograf. Draw prograf level daily and adjust dose as needed to maintain a therapeutic level.   - restarted MMF on 12/29.       At risk for opportunistic infections    - Cont with bactrim and valcyte for protection against  opportunistic infections.   - Cont fluconazole for fungal prophylaxis.     Overweight (BMI 25.0-29.9)           Adrenal cortical steroids causing adverse effect in therapeutic use    - Monitor.       Prophylactic immunotherapy    - See long term immunosuppression.        Weakness    - Pt remains significantly debilitated.   - PT/OT for strengthening.   - Currently will need SNF for placement and further rehabilitation.   - Pt remains severely debilitated and not strong enough for SNF at this time.    - SNF consult placed 12/17. However, denied for SNF again as not strong enough at this time.  - Rehab consulted.  We now have a contract w [facility] for rehab.  Rehab willing to accept patient when medically appropriate.    - Continue aggressive therapy.     Pleural effusion associated with hepatic disorder    - c/o increased pain w deep breath today to right side.  CXR showed increased opacity in the inferior hemothorax on the right side since 11/26/2018.  Pulse ox 97-99% on RA.  Cont to monitor.   - continues to have fluid to RLL.  WBC remains elevated.    - thoracentesis performed on 11/30. Fluid noted with 4k WBC, 93 SEGS. cx no growth to date.  Cefepime added for treatment.  - Chest CT showing right pleural effusion improved, left w increased pleural effusion.   - Per ID, completed treatment of empyema w Cefepime thru 12/8.  - sats remain 97-99% on RA.  - CXR 12/20 showed left hemidiaphragmatic margin that is better delineated than on 12/19/2018, consistent with improved aeration in the left lower lobe. + no significant detrimental interval change in the appearance of the chest, with the current examination demonstrating a slightly improved inspiratory depth level.    - denies SOB.  Last CXR 12/22 w improvement.  - pt with dyspnea starting 1/1/19. RA sats %. Chest xray showed moderated edema.   - Lasix 60 mg IV on 1/1 and 1/2/19.   - now improved.     Type 2 diabetes mellitus without complication    - Endocrine  consulted for management. Appreciate recs.   - Hypoglycemia 12/10 requiring D50.    - Repeat cx 12/11 - no growth.  - Blood glucose readings well controlled on tube feeds.     Anemia of chronic disease    - Last liver US 11/27. Evolving peritransplant hematomas with anechoic fluid collection adjacent to the right hepatic lobe.  Small volume perihepatic free fluid.  - Chest/Abd/pelvis CT 12/4 - no fluid to be drainged per transplant surgeon.  No source of bleeding.    - H&H slightly low 1/4/19.  Transfused w appropriate response.  No evidence of overt bleeding.  Monitor.           VTE Risk Mitigation (From admission, onward)        Ordered     heparin (porcine) injection 5,000 Units  Every 8 hours      12/24/18 0742     heparin (porcine) injection 1,000 Units  As needed (PRN)      12/12/18 1731     IP VTE HIGH RISK PATIENT  Once      11/11/18 1208          The patients clinical status was discussed at multidisplinary rounds, involving transplant surgery, transplant medicine, pharmacy, nursing, nutrition, and social work    Discharge Planning:  No Patient Care Coordination Note on file.      Hilary Galarza NP  Liver Transplant  [facility]

## 2019-01-06 LAB
ALBUMIN SERPL BCP-MCNC: 2.4 G/DL
ALP SERPL-CCNC: 169 U/L
ALT SERPL W/O P-5'-P-CCNC: 15 U/L
ANION GAP SERPL CALC-SCNC: 9 MMOL/L
AST SERPL-CCNC: 16 U/L
BASOPHILS # BLD AUTO: 0.32 K/UL
BASOPHILS NFR BLD: 3.6 %
BILIRUB SERPL-MCNC: 0.7 MG/DL
BUN SERPL-MCNC: 48 MG/DL
CALCIUM SERPL-MCNC: 9.1 MG/DL
CHLORIDE SERPL-SCNC: 103 MMOL/L
CO2 SERPL-SCNC: 30 MMOL/L
CREAT SERPL-MCNC: 1.1 MG/DL
DIFFERENTIAL METHOD: ABNORMAL
EOSINOPHIL # BLD AUTO: 0.5 K/UL
EOSINOPHIL NFR BLD: 5.3 %
ERYTHROCYTE [DISTWIDTH] IN BLOOD BY AUTOMATED COUNT: 15.1 %
EST. GFR  (AFRICAN AMERICAN): >60 ML/MIN/1.73 M^2
EST. GFR  (NON AFRICAN AMERICAN): >60 ML/MIN/1.73 M^2
GLUCOSE SERPL-MCNC: 190 MG/DL
HCT VFR BLD AUTO: 28.4 %
HGB BLD-MCNC: 8.7 G/DL
IMM GRANULOCYTES # BLD AUTO: 0.11 K/UL
IMM GRANULOCYTES NFR BLD AUTO: 1.2 %
LYMPHOCYTES # BLD AUTO: 1 K/UL
LYMPHOCYTES NFR BLD: 11.2 %
MAGNESIUM SERPL-MCNC: 1.5 MG/DL
MCH RBC QN AUTO: 28.2 PG
MCHC RBC AUTO-ENTMCNC: 30.6 G/DL
MCV RBC AUTO: 92 FL
MONOCYTES # BLD AUTO: 0.6 K/UL
MONOCYTES NFR BLD: 6.2 %
NEUTROPHILS # BLD AUTO: 6.5 K/UL
NEUTROPHILS NFR BLD: 72.5 %
NRBC BLD-RTO: 0 /100 WBC
PHOSPHATE SERPL-MCNC: 4.5 MG/DL
PLATELET # BLD AUTO: 456 K/UL
PMV BLD AUTO: 11.4 FL
POCT GLUCOSE: 130 MG/DL (ref 70–110)
POCT GLUCOSE: 141 MG/DL (ref 70–110)
POCT GLUCOSE: 182 MG/DL (ref 70–110)
POCT GLUCOSE: 206 MG/DL (ref 70–110)
POTASSIUM SERPL-SCNC: 3.8 MMOL/L
PROT SERPL-MCNC: 6.7 G/DL
RBC # BLD AUTO: 3.09 M/UL
SODIUM SERPL-SCNC: 142 MMOL/L
TACROLIMUS BLD-MCNC: 7.5 NG/ML
WBC # BLD AUTO: 9.01 K/UL

## 2019-01-06 PROCEDURE — 80197 ASSAY OF TACROLIMUS: CPT

## 2019-01-06 PROCEDURE — 25000003 PHARM REV CODE 250: Performed by: NURSE PRACTITIONER

## 2019-01-06 PROCEDURE — 84100 ASSAY OF PHOSPHORUS: CPT

## 2019-01-06 PROCEDURE — 25000003 PHARM REV CODE 250: Performed by: PHYSICIAN ASSISTANT

## 2019-01-06 PROCEDURE — 80053 COMPREHEN METABOLIC PANEL: CPT

## 2019-01-06 PROCEDURE — 83735 ASSAY OF MAGNESIUM: CPT

## 2019-01-06 PROCEDURE — 63600175 PHARM REV CODE 636 W HCPCS: Performed by: NURSE PRACTITIONER

## 2019-01-06 PROCEDURE — 99900035 HC TECH TIME PER 15 MIN (STAT)

## 2019-01-06 PROCEDURE — 97535 SELF CARE MNGMENT TRAINING: CPT

## 2019-01-06 PROCEDURE — 99233 PR SUBSEQUENT HOSPITAL CARE,LEVL III: ICD-10-PCS | Mod: 24,,, | Performed by: NURSE PRACTITIONER

## 2019-01-06 PROCEDURE — 85025 COMPLETE CBC W/AUTO DIFF WBC: CPT

## 2019-01-06 PROCEDURE — 25000003 PHARM REV CODE 250: Performed by: STUDENT IN AN ORGANIZED HEALTH CARE EDUCATION/TRAINING PROGRAM

## 2019-01-06 PROCEDURE — 97165 OT EVAL LOW COMPLEX 30 MIN: CPT

## 2019-01-06 PROCEDURE — 94664 DEMO&/EVAL PT USE INHALER: CPT

## 2019-01-06 PROCEDURE — 20600001 HC STEP DOWN PRIVATE ROOM

## 2019-01-06 PROCEDURE — 97110 THERAPEUTIC EXERCISES: CPT

## 2019-01-06 PROCEDURE — C9113 INJ PANTOPRAZOLE SODIUM, VIA: HCPCS | Performed by: NURSE PRACTITIONER

## 2019-01-06 PROCEDURE — 63600175 PHARM REV CODE 636 W HCPCS: Performed by: PHYSICIAN ASSISTANT

## 2019-01-06 PROCEDURE — 99233 SBSQ HOSP IP/OBS HIGH 50: CPT | Mod: 24,,, | Performed by: NURSE PRACTITIONER

## 2019-01-06 RX ORDER — MAGNESIUM SULFATE HEPTAHYDRATE 40 MG/ML
2 INJECTION, SOLUTION INTRAVENOUS ONCE
Status: COMPLETED | OUTPATIENT
Start: 2019-01-06 | End: 2019-01-06

## 2019-01-06 RX ADMIN — SUCRALFATE 1 G: 1 SUSPENSION ORAL at 02:01

## 2019-01-06 RX ADMIN — SUCRALFATE 1 G: 1 SUSPENSION ORAL at 08:01

## 2019-01-06 RX ADMIN — TACROLIMUS 2 MG: 1 CAPSULE ORAL at 08:01

## 2019-01-06 RX ADMIN — MAGNESIUM SULFATE IN WATER 2 G: 40 INJECTION, SOLUTION INTRAVENOUS at 08:01

## 2019-01-06 RX ADMIN — Medication 12.5 MG: at 08:01

## 2019-01-06 RX ADMIN — PANTOPRAZOLE SODIUM 40 MG: 40 INJECTION, POWDER, LYOPHILIZED, FOR SOLUTION INTRAVENOUS at 08:01

## 2019-01-06 RX ADMIN — MORPHINE 450 MG: 10 SOLUTION ORAL at 08:01

## 2019-01-06 RX ADMIN — SUCRALFATE 1 G: 1 SUSPENSION ORAL at 06:01

## 2019-01-06 RX ADMIN — INSULIN ASPART 2 UNITS: 100 INJECTION, SOLUTION INTRAVENOUS; SUBCUTANEOUS at 07:01

## 2019-01-06 RX ADMIN — GABAPENTIN 300 MG: 300 CAPSULE ORAL at 08:01

## 2019-01-06 RX ADMIN — FLUCONAZOLE 200 MG: 40 POWDER, FOR SUSPENSION ORAL at 08:01

## 2019-01-06 RX ADMIN — HEPARIN SODIUM 5000 UNITS: 5000 INJECTION, SOLUTION INTRAVENOUS; SUBCUTANEOUS at 02:01

## 2019-01-06 RX ADMIN — SULFAMETHOXAZOLE AND TRIMETHOPRIM 1 TABLET: 400; 80 TABLET ORAL at 08:01

## 2019-01-06 RX ADMIN — DRONABINOL 2.5 MG: 2.5 CAPSULE ORAL at 08:01

## 2019-01-06 RX ADMIN — Medication 500 MG: at 08:01

## 2019-01-06 RX ADMIN — HEPARIN SODIUM 5000 UNITS: 5000 INJECTION, SOLUTION INTRAVENOUS; SUBCUTANEOUS at 08:01

## 2019-01-06 RX ADMIN — ESCITALOPRAM OXALATE 20 MG: 5 SOLUTION ORAL at 12:01

## 2019-01-06 RX ADMIN — TACROLIMUS 2 MG: 1 CAPSULE ORAL at 06:01

## 2019-01-06 RX ADMIN — ASPIRIN 81 MG CHEWABLE TABLET 81 MG: 81 TABLET CHEWABLE at 08:01

## 2019-01-06 RX ADMIN — HEPARIN SODIUM 5000 UNITS: 5000 INJECTION, SOLUTION INTRAVENOUS; SUBCUTANEOUS at 06:01

## 2019-01-06 NOTE — ASSESSMENT & PLAN NOTE
- Pt remains significantly debilitated.   - PT/OT for strengthening.   - Currently will need SNF for placement and further rehabilitation.   - Pt remains severely debilitated and not strong enough for SNF at this time.    - SNF consult placed 12/17. However, denied for SNF again as not strong enough at this time.  - Rehab consulted.  We now have a contract w [facility] for rehab.  Rehab willing to accept patient when medically appropriate.    - Continue aggressive therapy.

## 2019-01-06 NOTE — SUBJECTIVE & OBJECTIVE
Scheduled Meds:   aspirin  81 mg Oral Daily    dronabinol  2.5 mg Oral BID    epoetin sherlyn (PROCRIT) injection  50 Units/kg (Dosing Weight) Intravenous Every Mon, Wed, Fri    ergocalciferol  50,000 Units Oral Q7 Days    escitalopram oxalate  20 mg Oral Daily    fluconazole 40 mg/ml  200 mg Oral Daily    gabapentin  300 mg Oral Daily    heparin (porcine)  5,000 Units Subcutaneous Q8H    lidocaine  1 patch Transdermal Q24H    metoprolol  12.5 mg Per NG tube BID    mycophenolate mofetil  500 mg Oral BID    pantoprazole  40 mg Intravenous BID    sucralfate  1 g Oral Q8H    sulfamethoxazole-trimethoprim 400-80mg  1 tablet Oral Daily    tacrolimus  2 mg Oral BID    valganciclovir 50 mg/ml  450 mg Oral Daily     Continuous Infusions:  PRN Meds:sodium chloride, acetaminophen, albuterol-ipratropium, aluminum & magnesium hydroxide-simethicone, artificial tears, bacitracin, bisacodyl, cyclobenzaprine, dextrose 50%, dextrose 50%, glucagon (human recombinant), glucose, glucose, heparin (porcine), insulin aspart U-100, ondansetron, traMADol    Review of Systems   Constitutional: Positive for activity change and appetite change (decreased). Negative for chills, fatigue and fever.   HENT: Negative for congestion, facial swelling, sore throat and voice change.    Eyes: Negative for pain, discharge and visual disturbance.   Respiratory: Negative for apnea, cough, choking, chest tightness, shortness of breath and wheezing.    Cardiovascular: Negative for chest pain, palpitations and leg swelling.   Gastrointestinal: Positive for nausea. Negative for abdominal distention, abdominal pain, constipation, diarrhea and vomiting.   Endocrine: Negative.    Genitourinary: Negative for difficulty urinating, dysuria, hematuria and urgency.   Musculoskeletal: Negative.  Negative for back pain, gait problem, neck pain and neck stiffness.   Skin: Positive for wound. Negative for color change and pallor.   Allergic/Immunologic:  Positive for immunocompromised state.   Neurological: Positive for weakness. Negative for dizziness, seizures, light-headedness and headaches.   Hematological: Negative.    Psychiatric/Behavioral: Negative for behavioral problems, confusion, decreased concentration, dysphoric mood, hallucinations, sleep disturbance and suicidal ideas. The patient is not nervous/anxious.      Objective:     Vital Signs (Most Recent):  Temp: 98.1 °F (36.7 °C) (01/06/19 0753)  Pulse: 87 (01/06/19 0753)  Resp: (!) 26 (01/06/19 0753)  BP: (!) 146/94 (01/06/19 0753)  SpO2: 98 % (01/06/19 0753) Vital Signs (24h Range):  Temp:  [98.1 °F (36.7 °C)-98.5 °F (36.9 °C)] 98.1 °F (36.7 °C)  Pulse:  [81-87] 87  Resp:  [22-31] 26  SpO2:  [95 %-99 %] 98 %  BP: (144-156)/(87-94) 146/94     Weight: 68.6 kg (151 lb 3.8 oz)  Body mass index is 21.7 kg/m².    Intake/Output - Last 3 Shifts       01/04 0700 - 01/05 0659 01/05 0700 - 01/06 0659 01/06 0700 - 01/07 0659    P.O. 780 280     I.V. (mL/kg)       Blood 446.7      NG/ 920     Total Intake(mL/kg) 2006.7 (29.4) 1200 (17.5)     Urine (mL/kg/hr) 1150 (0.7) 1490 (0.9)     Stool 0 0     Total Output 1150 1490     Net +856.7 -290            Urine Occurrence  1 x     Stool Occurrence 0 x 2 x           Physical Exam   Constitutional: He is oriented to person, place, and time. He appears well-developed. No distress.   Temporal and distal extremity muscle wasting noted.   HENT:   Head: Normocephalic and atraumatic.   Mouth/Throat: Oropharynx is clear and moist. No oropharyngeal exudate.   Eyes: EOM are normal.   Neck: Normal range of motion. Neck supple. No JVD present.   Cardiovascular: Normal rate, regular rhythm, normal heart sounds and intact distal pulses.   No murmur heard.  Pulmonary/Chest: Effort normal. No respiratory distress. He has decreased breath sounds in the right lower field and the left lower field. He has no wheezes. He exhibits no tenderness.   Abdominal: Soft. Bowel sounds are  normal. He exhibits no distension. There is no tenderness.   Chevron inc clean, dry, intact with steri strips in place     Musculoskeletal: Normal range of motion. He exhibits no edema or tenderness.   Neurological: He is alert and oriented to person, place, and time. He has normal reflexes.   Skin: Skin is warm and dry. Capillary refill takes 2 to 3 seconds. He is not diaphoretic. No pallor.   Psychiatric: He has a normal mood and affect. His behavior is normal. Thought content normal. His affect is not labile. He is not slowed. Cognition and memory are not impaired.   Nursing note and vitals reviewed.      Laboratory:  Immunosuppressants         Stop Route Frequency     tacrolimus capsule 2 mg      -- Oral 2 times daily     mycophenolate mofetil 200 mg/mL suspension 500 mg      -- Oral 2 times daily        CBC:   Recent Labs   Lab 01/06/19  0600   WBC 9.01   RBC 3.09*   HGB 8.7*   HCT 28.4*   *   MCV 92   MCH 28.2   MCHC 30.6*     CMP:   Recent Labs   Lab 01/06/19  0600   *   CALCIUM 9.1   ALBUMIN 2.4*   PROT 6.7      K 3.8   CO2 30*      BUN 48*   CREATININE 1.1   ALKPHOS 169*   ALT 15   AST 16   BILITOT 0.7     Labs within the past 24 hours have been reviewed.    Diagnostic Results:  I have personally reviewed all pertinent imaging studies.

## 2019-01-06 NOTE — PT/OT/SLP PROGRESS
Occupational Therapy   Treatment    Name: Femi Enciso  MRN: [id number]  Admitting Diagnosis:  S/P liver transplant  13 Days Post-Op    Recommendations:     Discharge Recommendations: nursing facility, skilled  Discharge Equipment Recommendations:  (TBD at next level of care)  Barriers to discharge:  None    Subjective     Pain/Comfort:  · Pain Rating 1: 0/10  · Pain Rating Post-Intervention 1: 0/10    Objective:     Communicated with: RN prior to session.  Patient found with all lines intact, call button in reach and RN notified and telemetry, NG tube upon OT entry to room.    General Precautions: Standard, fall   Orthopedic Precautions:N/A   Braces: N/A     Occupational Performance:    Bed Mobility:    · Patient completed Rolling/Turning to Left with  supervision and with side rail  · Patient completed Supine to Sit with minimum assistance ( Trunk assistance)    Functional Mobility/Transfers:  · Patient completed Sit <> Stand Transfer with contact guard assistance  with  no assistive device from EOB; Minimal assistance from bedside chair  · Patient completed Bed <> Chair Transfer using Stand Pivot technique with contact guard assistance with rolling walker  · Functional Mobility: Pt completed functional mobility in hallway using RW and CGA-min A for balance and safety. Pt walked ~150 feet with one seated break. Chair follow for safety; pt demo's unsteady gait and complaints of LE fatigue throughout     Activities of Daily Living:  · Grooming: contact guard assistance to wash hands while standing at sink ( wihout using RW for support)  · Upper Body Dressing: minimum assistance to milady gown like jacket while seated EOB    [facility] 6 Click ADL: 19    Treatment & Education:  -Pt edu on OT role/POC  -Importance of OOB activity with staff assistance ( UIC throughout the day/ perform BUE and BLE 3x per day)   -Safety during functional t/f and mobility   - White board updated  - Multiple self care tasks completed-- assistance  level noted above  - All questions/concerns answered within OT scope of practice    Patient left up in chair with all lines intact, call button in reach and RN notified  Education:    Assessment:     Femi Enciso is a 53 y.o. male with a medical diagnosis of S/P liver transplant. Pt found supine in bed upon arrival, alert, and willing to participate in session. He presents with the following performance deficits affecting function are weakness, impaired endurance, gait instability, impaired balance, impaired self care skills, impaired functional mobilty, impaired cardiopulmonary response to activity. Pt demo's progress with overall activity tolerance and towards self care goals. He would benefit from SNF following d/c to continue to progress towards goals and improve quality of life.     Rehab Prognosis:  Good; patient would benefit from acute skilled OT services to address these deficits and reach maximum level of function.       Plan:     Patient to be seen 4 x/week to address the above listed problems via self-care/home management, therapeutic activities, therapeutic exercises, neuromuscular re-education  · Plan of Care Expires: 01/20/19  · Plan of Care Reviewed with: patient    This Plan of care has been discussed with the patient who was involved in its development and understands and is in agreement with the identified goals and treatment plan    GOALS:   Multidisciplinary Problems     Occupational Therapy Goals        Problem: Occupational Therapy Goal    Goal Priority Disciplines Outcome Interventions   Occupational Therapy Goal     OT, PT/OT Ongoing (interventions implemented as appropriate)    Description:  Goals to be met by: 12/31/18 ( Goals revised: 12/18)    Patient will increase functional independence with ADLs by performing:    UE Dressing with Supervision.   LE Dressing with Minimal Assistance.( met with socks; continue with pants)  Grooming while standing at sink with Stand-by Assistance. Met  1/3/2018  Toileting from toilet with Moderate Assistance for hygiene and clothing management.   Toilet transfer to toilet with moderate assistance.  Upper extremity  theraband exercise program x  15 reps  with independence. Met 12/9                        Multidisciplinary Problems (Resolved)        Problem: Occupational Therapy Goal    Goal Priority Disciplines Outcome Interventions   Occupational Therapy Goal   (Resolved)     OT, PT/OT Resolved for Upgrade                    Time Tracking:     OT Date of Treatment: 01/06/19  OT Start Time: 1311  OT Stop Time: 1335  OT Total Time (min): 24 min    Billable Minutes:Self Care/Home Management 10  Therapeutic Exercise 14    Julia ladd OT  1/6/2019

## 2019-01-06 NOTE — ASSESSMENT & PLAN NOTE
- Dietician following. Severe temporal, clavicle muscle depletion noted. Severe subq fat loss.  - Nutrition has been poor over the past 24 hours.   - Discussed at length with patient and caregivers that if patient's nutrition status does not improve, will need supplemental nutrition via tube feeds or TPN.  - Appetite stimulant started 12/19.   - Caregivers to bring in outside food for patient.   - Continue calorie count.  - EGD Monday 12/24 as pt c/o poor appetite + burning in chest/esophagus and occasionally vomiting after eating x several weeks.   - EGD 12/24 unremarkable. prabhakar tube placed afterwards.   - TF started for nutrition 12/24/18. TF at goal rate, 50 ml/hr.  - Prealbumin 7 on 12/26. Repeat 9 on 1/3.  - Started scheduled remeron 12/31.   - dc remeron and start marinol 1/3.  - Cont to watch pt on marinol at this time.   - pt discharged delayed 2/2 not able to eat.  Plan for EGD and hopefully PEG placement on Monday.

## 2019-01-06 NOTE — NURSING
Pt's JHON tube has clogged.  Unable to flush, aspirate, or infuse tube feeding.  Pulled JHON tube per NP request as pt will be undergoing EGD tomorrow with possible PEG placement.  Pt tolerated well.  RN will continue to monitor.

## 2019-01-06 NOTE — PLAN OF CARE
Problem: Occupational Therapy Goal  Goal: Occupational Therapy Goal  Goals to be met by: 12/31/18 ( Goals revised: 12/18)    Patient will increase functional independence with ADLs by performing:    UE Dressing with Supervision.   LE Dressing with Minimal Assistance.( met with socks; continue with pants)  Grooming while standing at sink with Stand-by Assistance. Met 1/3/2018  Toileting from toilet with Moderate Assistance for hygiene and clothing management.   Toilet transfer to toilet with moderate assistance.  Upper extremity  theraband exercise program x  15 reps  with independence. Met 12/9             Outcome: Ongoing (interventions implemented as appropriate)  Continue with POC. Pt progressing well with goals.   Julia ladd, OT  1/6/2019

## 2019-01-06 NOTE — PLAN OF CARE
Problem: Patient Care Overview  Goal: Plan of Care Review  Outcome: Ongoing (interventions implemented as appropriate)  Pt AAOx4, VSS, in NAD throughout shift.  Pt worked with OT successfully today.  Pt tolerated JHON tube removal and JHON tube not in place.  Pt encouraged to eat and drink this shift, even if only small bites and sips at a time.  Pt up in chair for > 3 hours so far this shift.  Pt's Mg level 1.5 this am, RN treated with IVPB mag x1 as ordered, pt tolerated well.  RN providing encouragement for all ADLs, adjusting position in bed, pressing button to elevate and lower head of bed, et al.  Pt performs most of these tasks without complaint.  Pt's pain relieved this shift with evening dose of gabapentin and am dose of gabapentin; pt has been napping this shift to catch up on missed sleep r/t the back pain.  RN encouraging use of heat packs, lidocaine patch, ambulation, and position changes to also relieve pain.  Pt verbalizes understanding.  RN maintaining fall risk precautions throughout shift.  Pt denies pain or other need at this time; RN will continue to monitor, assess, and alter plan of care as needed until report given to oncoming night shift nurse.

## 2019-01-06 NOTE — PROGRESS NOTES
[facility]  Liver Transplant  Progress Note    Patient Name: Femi Enciso  MRN: [id number]  Admission Date: 10/27/2018  Hospital Length of Stay: 71 days  Code Status: Full Code  Primary Care Provider: Primary Doctor No  Post-Operative Day: 56    ORGAN:   LIVER  Disease Etiology: Alcoholic Cirrhosis  Donor Type:    - Brain Death  CDC High Risk:   No  Donor CMV Status:   Donor CMV Status: Positive  Donor HBcAB:   Negative  Donor HCV Status:   Negative  Donor HBV JAVIER: Negative  Donor HCV JAVIER: Negative  Whole or Partial: Whole Liver  Biliary Anastomosis: End to End  Arterial Anatomy: Standard  Subjective:     History of Present Illness:  Femi Enciso is a 53yr old male with PMH of acute ETOH hepatitis and DEL/ATN evolved to ESRD requiring HD (not candidate for KTX). He is now s/p liver transplant (SM induction, DBD, CMV D+/R-). Transplant surgery noted severe collateralization, adrenal gland firmly adhered to liver. Bare area of adrenal gland required several stitches and packing to obtain fair hemostasis (EBL 15L). OR take back  for bleeding from R adrenal bed in AM (significant clot in retroperitoneum, posterior to R hepatic lobe, tract posterior to the R kidney containing significant clot, small bleeding area of retroperitoneal fat) then returned to surgery same day for hemorrhaging closed with wound vac with plans to return to OR 24-48 hours for closure (retroperitoneal /retrorenal/retrocolic hematoma on the right ). Raw retroperitoneal bleeding. Suture ligatures placed, argon beam cautery, evarest placed in retroperitoneum behind cava and right kidney. Significant oozing from upper right adrenal gland, not amenable to ligation, that portion of the adrenal gland was resected with cautery). OR take back  for washout and incisional closure, no significant bleeding or hematoma found. OR cultures   from body fluid grew enterococcus faecium VRE. ID was consulted,  started on  daptomycin for VRE treatment and cefepime/flagyl to cover for IA ty in bile. Patient with significant leukocytosis with peak on 11/22 at 48K prompting drainage of R subphrenic fluid collection, perc drain placed, culture grew VRE. Stroke code called 11/21 for lethargy and unresponsive, CT head without evidence of acute ischemic changes and CTA chest negative, vascular neurology was consulted recommended MRI brain, but patient's AMS improved shortly after event. Nephrology consulted for ESRD requiring HD. Patient overall improved on antibiotic regimen, leukocytosis and AMS improved. He was transferred to TSU on POD #15.     Hospital Course:  Pt c/o CP 12/21. EKG stable from prior. Troponin wnl. CP resolved 12/22. CXR 12/20 showed no detrimental change. Pt hypertensive prior to HD.  Dialysis tolerated well 12/21 with 1.5L taken off. Bps much improved after dialysis, but monitoring closely.    Interval History: no acute events overnight.  Chief complaint- back pain, improved w changing Zanaflex to Flexeril.  Of note, JHON accidentally removed 1/4/19.  He denies n/v.  Cont TF.  He reports he still is having difficulty w eating.  He is 'picking'.  Encouraged pt to cont to try to eat.  Cont Marinol.  Cont Carafate.  Tentative plan for EGD and if no abnormalities, PEG placement on Monday.   Cr level stable.  Good UOP.  Last HD 12/26/18.  Responded appropriately to blood transfusion 1/4/19. Continue aggressive PT daily.     Scheduled Meds:   aspirin  81 mg Oral Daily    dronabinol  2.5 mg Oral BID    epoetin sherlyn (PROCRIT) injection  50 Units/kg (Dosing Weight) Intravenous Every Mon, Wed, Fri    ergocalciferol  50,000 Units Oral Q7 Days    escitalopram oxalate  20 mg Oral Daily    fluconazole 40 mg/ml  200 mg Oral Daily    gabapentin  300 mg Oral Daily    heparin (porcine)  5,000 Units Subcutaneous Q8H    lidocaine  1 patch Transdermal Q24H    metoprolol  12.5 mg Per NG tube BID    mycophenolate mofetil  500 mg  Oral BID    pantoprazole  40 mg Intravenous BID    sucralfate  1 g Oral Q8H    sulfamethoxazole-trimethoprim 400-80mg  1 tablet Oral Daily    tacrolimus  2 mg Oral BID    valganciclovir 50 mg/ml  450 mg Oral Daily     Continuous Infusions:  PRN Meds:sodium chloride, acetaminophen, albuterol-ipratropium, aluminum & magnesium hydroxide-simethicone, artificial tears, bacitracin, bisacodyl, cyclobenzaprine, dextrose 50%, dextrose 50%, glucagon (human recombinant), glucose, glucose, heparin (porcine), insulin aspart U-100, ondansetron, traMADol    Review of Systems   Constitutional: Positive for activity change and appetite change (decreased). Negative for chills, fatigue and fever.   HENT: Negative for congestion, facial swelling, sore throat and voice change.    Eyes: Negative for pain, discharge and visual disturbance.   Respiratory: Negative for apnea, cough, choking, chest tightness, shortness of breath and wheezing.    Cardiovascular: Negative for chest pain, palpitations and leg swelling.   Gastrointestinal: Positive for nausea. Negative for abdominal distention, abdominal pain, constipation, diarrhea and vomiting.   Endocrine: Negative.    Genitourinary: Negative for difficulty urinating, dysuria, hematuria and urgency.   Musculoskeletal: Negative.  Negative for back pain, gait problem, neck pain and neck stiffness.   Skin: Positive for wound. Negative for color change and pallor.   Allergic/Immunologic: Positive for immunocompromised state.   Neurological: Positive for weakness. Negative for dizziness, seizures, light-headedness and headaches.   Hematological: Negative.    Psychiatric/Behavioral: Negative for behavioral problems, confusion, decreased concentration, dysphoric mood, hallucinations, sleep disturbance and suicidal ideas. The patient is not nervous/anxious.      Objective:     Vital Signs (Most Recent):  Temp: 98.1 °F (36.7 °C) (01/06/19 0753)  Pulse: 87 (01/06/19 0753)  Resp: (!) 26 (01/06/19  0753)  BP: (!) 146/94 (01/06/19 0753)  SpO2: 98 % (01/06/19 0753) Vital Signs (24h Range):  Temp:  [98.1 °F (36.7 °C)-98.5 °F (36.9 °C)] 98.1 °F (36.7 °C)  Pulse:  [81-87] 87  Resp:  [22-31] 26  SpO2:  [95 %-99 %] 98 %  BP: (144-156)/(87-94) 146/94     Weight: 68.6 kg (151 lb 3.8 oz)  Body mass index is 21.7 kg/m².    Intake/Output - Last 3 Shifts       01/04 0700 - 01/05 0659 01/05 0700 - 01/06 0659 01/06 0700 - 01/07 0659    P.O. 780 280     I.V. (mL/kg)       Blood 446.7      NG/ 920     Total Intake(mL/kg) 2006.7 (29.4) 1200 (17.5)     Urine (mL/kg/hr) 1150 (0.7) 1490 (0.9)     Stool 0 0     Total Output 1150 1490     Net +856.7 -290            Urine Occurrence  1 x     Stool Occurrence 0 x 2 x           Physical Exam   Constitutional: He is oriented to person, place, and time. He appears well-developed. No distress.   Temporal and distal extremity muscle wasting noted.   HENT:   Head: Normocephalic and atraumatic.   Mouth/Throat: Oropharynx is clear and moist. No oropharyngeal exudate.   Eyes: EOM are normal.   Neck: Normal range of motion. Neck supple. No JVD present.   Cardiovascular: Normal rate, regular rhythm, normal heart sounds and intact distal pulses.   No murmur heard.  Pulmonary/Chest: Effort normal. No respiratory distress. He has decreased breath sounds in the right lower field and the left lower field. He has no wheezes. He exhibits no tenderness.   Abdominal: Soft. Bowel sounds are normal. He exhibits no distension. There is no tenderness.   Chevron inc clean, dry, intact with steri strips in place     Musculoskeletal: Normal range of motion. He exhibits no edema or tenderness.   Neurological: He is alert and oriented to person, place, and time. He has normal reflexes.   Skin: Skin is warm and dry. Capillary refill takes 2 to 3 seconds. He is not diaphoretic. No pallor.   Psychiatric: He has a normal mood and affect. His behavior is normal. Thought content normal. His affect is not labile.  He is not slowed. Cognition and memory are not impaired.   Nursing note and vitals reviewed.      Laboratory:  Immunosuppressants         Stop Route Frequency     tacrolimus capsule 2 mg      -- Oral 2 times daily     mycophenolate mofetil 200 mg/mL suspension 500 mg      -- Oral 2 times daily        CBC:   Recent Labs   Lab 01/06/19  0600   WBC 9.01   RBC 3.09*   HGB 8.7*   HCT 28.4*   *   MCV 92   MCH 28.2   MCHC 30.6*     CMP:   Recent Labs   Lab 01/06/19  0600   *   CALCIUM 9.1   ALBUMIN 2.4*   PROT 6.7      K 3.8   CO2 30*      BUN 48*   CREATININE 1.1   ALKPHOS 169*   ALT 15   AST 16   BILITOT 0.7     Labs within the past 24 hours have been reviewed.    Diagnostic Results:  I have personally reviewed all pertinent imaging studies.    Assessment/Plan:     * S/P liver transplant    - LFTs now improving slowly.  T bili was 37.6 day of transplant.  - Drain placed during take back. Drain placed to intra-abdominal abscess on 11/22.   - Fluid from collection noted with enterococcus VRE completed Zyvox treatment.  - Routine 1 month Liver US 12/17 which showed elevated velocity of hepatic artery and low RIs.   - Vascular Surgery consulted. Recommend repeat US in 10-14 days (12/27-12/31) to reassess. Appreciate Vascular recs.  LFTs stable.  - Liver US 12/28 to reassess HAS revealed elevated but slightly improved main hepatic artery velocity and mildly improved RIs.   - Will repeat US in 2 weeks (1/11/19).        Back pain, chronic    - chronic.  Zanaflex changed to Flexeril 1/5/19.  Cont Lidocaine patches,  Encourage OOB.         Dyspnea    - pt with sob and chest discomfort 1/1/19.  - RA sats %.  - chest xray showed moderate edema.  - cardiac enzymes and ekg negative on 1/1 and 1/2.  - Lasix 60 mg IV given x 2 (last dose on 1/2) with appropriate response.  - ABGs stable. Reviewed with staff.   - Resp status overall improved now.      Other dysphagia    - pt reports difficulty swallowing  "pills.  - SLP consult, appreciate recs.  - switched PO meds that pt is unable to swallow to liquid/IV.  - Dysphagia has since improved but pt now with persistent nausea limiting PO intake.   - no mechanical deficit noted.     Hypokalemia    - Continue to monitor and replace as needed.     Acute kidney failure with lesion of tubular necrosis    - Previously on Dialysis MWF. On Hold for now as kidneys recovering.  - Last HD 12/26/18.  - Good UOP.  - Nephrology following, appreciate recs.  - Monitor.     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       Hypomagnesemia    - Replace Mag PRN.      Severe malnutrition    - Dietician following. Severe temporal, clavicle muscle depletion noted. Severe subq fat loss.  - Nutrition has been poor over the past 24 hours.   - Discussed at length with patient and caregivers that if patient's nutrition status does not improve, will need supplemental nutrition via tube feeds or TPN.  - Appetite stimulant started 12/19.   - Caregivers to bring in outside food for patient.   - Continue calorie count.  - EGD Monday 12/24 as pt c/o poor appetite + burning in chest/esophagus and occasionally vomiting after eating x several weeks.   - EGD 12/24 unremarkable. prabhakar tube placed afterwards.   - TF started for nutrition 12/24/18. TF at goal rate, 50 ml/hr.  - Prealbumin 7 on 12/26. Repeat 9 on 1/3.  - Started scheduled remeron 12/31.   - dc remeron and start marinol 1/3.  - Cont to watch pt on marinol at this time.   - pt discharged delayed 2/2 not able to eat.  Plan for EGD and hopefully PEG placement on Monday.     Non-intractable vomiting with nausea    - Intermittent, worse over past 10 days,  Changed Pepcid to Protonix 12/9/18.  Appetite seems to be slowly improving.    - Encourage multiple, small meals and cont PRN anti-emetics.    - GI consulted.  EGD 12/24 w normal esophagus, erythematous mucosa in the antrum and nodular mucosa in the " duodenal bulb w biopsies.  Path pending.  - PRABHAKAR placed 12/24.  Pt tolerating tube feeds.  Denies nausea w TF.    - Pt only tolerating very minimal po intake.  - GI re-consulted.   - pt unable to tolerate food for gastric emptying study.   - plan to start Carafate as per GI recs as symptoms could be bile reflux.   - If no improvement is noted then plan for EGD and PEG placement on Monday 1/7/19 per GI.        Anxiety    - Restarted Lexapro as per psych recs, 12/13. Pt also on Marinol.    - Monitor.     Prolonged Q-T interval on ECG    -  ms on EKG 12/19.  -  on EKG 12/21.  - Monitor.       Alcohol use disorder, severe, in early remission, dependence    - Monitor.       Decreased appetite    - Continue appetite stimulant.  - GI consulted as pt c/o burning sensation in chest/esophagus + vomiting after eating, passed SLP eval, recommended GI f/u.  - Cont to encourage PO intake. Ordered additional supplements.   - EGD 12/24, unremarkable. prabhakar tube placed and TF started. Increased TF to 45 ml, new goal rate 50 ml/hr.  - Prealbumin 7 on 12/26. Repeat prealbumin 9 on 1/3.  - started remeron 12/31.   - dc remeron and start marinol 1/3/19.   - tolerated kit keri and lenny's peanut butter cup. Encourage PO intake.      Acute renal failure with acute tubular necrosis superimposed on stage 3 chronic kidney disease    - 2 weeks for DEL prior to transplant.  - Renal function slow to recover post-op.   - Nephrology consulted for management.   - Cont with HD as per nephrology recs.  Apprec recs.    - Lasix 160 mg challenge 11/28 w/ minimal output.    - HD MWF. Tolerated well 12/26.    - Held HD on 12/28.   - Strict I&Os.   - Albumin 25% x 3 doses 12/28.  - Renal function continuing to improve. Creatinine decreasing with increasing UOP.   - Has not required HD since Wednesday 12/26.        Long-term use of immunosuppressant medication    - Cont with prograf. Draw prograf level daily and adjust dose as needed to maintain  a therapeutic level.   - restarted MMF on 12/29.       At risk for opportunistic infections    - Cont with bactrim and valcyte for protection against opportunistic infections.   - Cont fluconazole for fungal prophylaxis.     Overweight (BMI 25.0-29.9)           Adrenal cortical steroids causing adverse effect in therapeutic use    - Monitor.       Prophylactic immunotherapy    - See long term immunosuppression.        Weakness    - Pt remains significantly debilitated.   - PT/OT for strengthening.   - Currently will need SNF for placement and further rehabilitation.   - Pt remains severely debilitated and not strong enough for SNF at this time.    - SNF consult placed 12/17. However, denied for SNF again as not strong enough at this time.  - Rehab consulted.  We now have a contract w [facility] for rehab.  Rehab willing to accept patient when medically appropriate.    - Continue aggressive therapy.     Pleural effusion associated with hepatic disorder    - c/o increased pain w deep breath today to right side.  CXR showed increased opacity in the inferior hemothorax on the right side since 11/26/2018.  Pulse ox 97-99% on RA.  Cont to monitor.   - continues to have fluid to RLL.  WBC remains elevated.    - thoracentesis performed on 11/30. Fluid noted with 4k WBC, 93 SEGS. cx no growth to date.  Cefepime added for treatment.  - Chest CT showing right pleural effusion improved, left w increased pleural effusion.   - Per ID, completed treatment of empyema w Cefepime thru 12/8.  - sats remain 97-99% on RA.  - CXR 12/20 showed left hemidiaphragmatic margin that is better delineated than on 12/19/2018, consistent with improved aeration in the left lower lobe. + no significant detrimental interval change in the appearance of the chest, with the current examination demonstrating a slightly improved inspiratory depth level.    - denies SOB.  Last CXR 12/22 w improvement.  - pt with dyspnea starting 1/1/19. RA sats %. Chest  xray showed moderated edema.   - Lasix 60 mg IV on 1/1 and 1/2/19.   - now improved.     Type 2 diabetes mellitus without complication    - Endocrine consulted for management. Appreciate recs.   - Hypoglycemia 12/10 requiring D50.    - Repeat cx 12/11 - no growth.  - Blood glucose readings well controlled on tube feeds.     Anemia of chronic disease    - Last liver US 11/27. Evolving peritransplant hematomas with anechoic fluid collection adjacent to the right hepatic lobe.  Small volume perihepatic free fluid.  - Chest/Abd/pelvis CT 12/4 - no fluid to be drainged per transplant surgeon.  No source of bleeding.    - H&H slightly low 1/4/19.  Transfused w appropriate response.  No evidence of overt bleeding.  Monitor.           VTE Risk Mitigation (From admission, onward)        Ordered     heparin (porcine) injection 5,000 Units  Every 8 hours      12/24/18 0742     heparin (porcine) injection 1,000 Units  As needed (PRN)      12/12/18 1731     IP VTE HIGH RISK PATIENT  Once      11/11/18 1208          The patients clinical status was discussed at multidisplinary rounds, involving transplant surgery, transplant medicine, pharmacy, nursing, nutrition, and social work    Discharge Planning:  Tentatively d/c to rehab this week.    Hilary Galarza NP  Liver Transplant  [facility]

## 2019-01-07 LAB
ALBUMIN SERPL BCP-MCNC: 2.3 G/DL
ALP SERPL-CCNC: 162 U/L
ALT SERPL W/O P-5'-P-CCNC: 19 U/L
ANION GAP SERPL CALC-SCNC: 12 MMOL/L
AST SERPL-CCNC: 17 U/L
BASOPHILS # BLD AUTO: 0.25 K/UL
BASOPHILS NFR BLD: 3 %
BILIRUB SERPL-MCNC: 0.9 MG/DL
BUN SERPL-MCNC: 48 MG/DL
CALCIUM SERPL-MCNC: 9.2 MG/DL
CHLORIDE SERPL-SCNC: 103 MMOL/L
CO2 SERPL-SCNC: 27 MMOL/L
CREAT SERPL-MCNC: 1.1 MG/DL
DIFFERENTIAL METHOD: ABNORMAL
EOSINOPHIL # BLD AUTO: 0.4 K/UL
EOSINOPHIL NFR BLD: 4.2 %
ERYTHROCYTE [DISTWIDTH] IN BLOOD BY AUTOMATED COUNT: 15.3 %
EST. GFR  (AFRICAN AMERICAN): >60 ML/MIN/1.73 M^2
EST. GFR  (NON AFRICAN AMERICAN): >60 ML/MIN/1.73 M^2
GLUCOSE SERPL-MCNC: 93 MG/DL
HCT VFR BLD AUTO: 27.2 %
HGB BLD-MCNC: 8.5 G/DL
IMM GRANULOCYTES # BLD AUTO: 0.1 K/UL
IMM GRANULOCYTES NFR BLD AUTO: 1.2 %
LYMPHOCYTES # BLD AUTO: 1.1 K/UL
LYMPHOCYTES NFR BLD: 13.3 %
MAGNESIUM SERPL-MCNC: 1.7 MG/DL
MCH RBC QN AUTO: 28.7 PG
MCHC RBC AUTO-ENTMCNC: 31.3 G/DL
MCV RBC AUTO: 92 FL
MONOCYTES # BLD AUTO: 0.5 K/UL
MONOCYTES NFR BLD: 5.9 %
NEUTROPHILS # BLD AUTO: 6 K/UL
NEUTROPHILS NFR BLD: 72.4 %
NRBC BLD-RTO: 0 /100 WBC
PHOSPHATE SERPL-MCNC: 4.7 MG/DL
PLATELET # BLD AUTO: 424 K/UL
PMV BLD AUTO: 11 FL
POCT GLUCOSE: 101 MG/DL (ref 70–110)
POCT GLUCOSE: 134 MG/DL (ref 70–110)
POCT GLUCOSE: 157 MG/DL (ref 70–110)
POTASSIUM SERPL-SCNC: 3.8 MMOL/L
PROT SERPL-MCNC: 6.4 G/DL
RBC # BLD AUTO: 2.96 M/UL
SODIUM SERPL-SCNC: 142 MMOL/L
TACROLIMUS BLD-MCNC: 6.2 NG/ML
WBC # BLD AUTO: 8.33 K/UL

## 2019-01-07 PROCEDURE — 63600175 PHARM REV CODE 636 W HCPCS: Performed by: NURSE PRACTITIONER

## 2019-01-07 PROCEDURE — 94664 DEMO&/EVAL PT USE INHALER: CPT

## 2019-01-07 PROCEDURE — 25000003 PHARM REV CODE 250: Performed by: STUDENT IN AN ORGANIZED HEALTH CARE EDUCATION/TRAINING PROGRAM

## 2019-01-07 PROCEDURE — 80053 COMPREHEN METABOLIC PANEL: CPT

## 2019-01-07 PROCEDURE — 20600001 HC STEP DOWN PRIVATE ROOM

## 2019-01-07 PROCEDURE — C9113 INJ PANTOPRAZOLE SODIUM, VIA: HCPCS | Performed by: NURSE PRACTITIONER

## 2019-01-07 PROCEDURE — 99233 PR SUBSEQUENT HOSPITAL CARE,LEVL III: ICD-10-PCS | Mod: 24,,, | Performed by: NURSE PRACTITIONER

## 2019-01-07 PROCEDURE — 80197 ASSAY OF TACROLIMUS: CPT

## 2019-01-07 PROCEDURE — 25000003 PHARM REV CODE 250: Performed by: PHYSICIAN ASSISTANT

## 2019-01-07 PROCEDURE — 85025 COMPLETE CBC W/AUTO DIFF WBC: CPT

## 2019-01-07 PROCEDURE — 25000003 PHARM REV CODE 250: Performed by: NURSE PRACTITIONER

## 2019-01-07 PROCEDURE — 97530 THERAPEUTIC ACTIVITIES: CPT

## 2019-01-07 PROCEDURE — 99900035 HC TECH TIME PER 15 MIN (STAT)

## 2019-01-07 PROCEDURE — 99233 SBSQ HOSP IP/OBS HIGH 50: CPT | Mod: 24,,, | Performed by: NURSE PRACTITIONER

## 2019-01-07 PROCEDURE — 94761 N-INVAS EAR/PLS OXIMETRY MLT: CPT

## 2019-01-07 PROCEDURE — 84100 ASSAY OF PHOSPHORUS: CPT

## 2019-01-07 PROCEDURE — 99232 PR SUBSEQUENT HOSPITAL CARE,LEVL II: ICD-10-PCS | Mod: ,,, | Performed by: NURSE PRACTITIONER

## 2019-01-07 PROCEDURE — 99232 SBSQ HOSP IP/OBS MODERATE 35: CPT | Mod: ,,, | Performed by: NURSE PRACTITIONER

## 2019-01-07 PROCEDURE — 63600175 PHARM REV CODE 636 W HCPCS: Performed by: PHYSICIAN ASSISTANT

## 2019-01-07 PROCEDURE — 83735 ASSAY OF MAGNESIUM: CPT

## 2019-01-07 PROCEDURE — 63600175 PHARM REV CODE 636 W HCPCS: Mod: JG | Performed by: INTERNAL MEDICINE

## 2019-01-07 PROCEDURE — 97116 GAIT TRAINING THERAPY: CPT

## 2019-01-07 PROCEDURE — 97110 THERAPEUTIC EXERCISES: CPT

## 2019-01-07 RX ADMIN — SUCRALFATE 1 G: 1 SUSPENSION ORAL at 03:01

## 2019-01-07 RX ADMIN — CYCLOBENZAPRINE HYDROCHLORIDE 5 MG: 5 TABLET, FILM COATED ORAL at 08:01

## 2019-01-07 RX ADMIN — DRONABINOL 2.5 MG: 2.5 CAPSULE ORAL at 08:01

## 2019-01-07 RX ADMIN — ASPIRIN 81 MG CHEWABLE TABLET 81 MG: 81 TABLET CHEWABLE at 10:01

## 2019-01-07 RX ADMIN — PANTOPRAZOLE SODIUM 40 MG: 40 INJECTION, POWDER, LYOPHILIZED, FOR SOLUTION INTRAVENOUS at 08:01

## 2019-01-07 RX ADMIN — Medication 500 MG: at 08:01

## 2019-01-07 RX ADMIN — HEPARIN SODIUM 5000 UNITS: 5000 INJECTION, SOLUTION INTRAVENOUS; SUBCUTANEOUS at 08:01

## 2019-01-07 RX ADMIN — TACROLIMUS 2 MG: 1 CAPSULE ORAL at 09:01

## 2019-01-07 RX ADMIN — DRONABINOL 2.5 MG: 2.5 CAPSULE ORAL at 10:01

## 2019-01-07 RX ADMIN — ESCITALOPRAM OXALATE 20 MG: 5 SOLUTION ORAL at 10:01

## 2019-01-07 RX ADMIN — PANTOPRAZOLE SODIUM 40 MG: 40 INJECTION, POWDER, LYOPHILIZED, FOR SOLUTION INTRAVENOUS at 09:01

## 2019-01-07 RX ADMIN — FLUCONAZOLE 200 MG: 40 POWDER, FOR SUSPENSION ORAL at 04:01

## 2019-01-07 RX ADMIN — SUCRALFATE 1 G: 1 SUSPENSION ORAL at 08:01

## 2019-01-07 RX ADMIN — MORPHINE 450 MG: 10 SOLUTION ORAL at 09:01

## 2019-01-07 RX ADMIN — Medication 12.5 MG: at 09:01

## 2019-01-07 RX ADMIN — Medication 500 MG: at 09:01

## 2019-01-07 RX ADMIN — SUCRALFATE 1 G: 1 SUSPENSION ORAL at 06:01

## 2019-01-07 RX ADMIN — TACROLIMUS 2 MG: 1 CAPSULE ORAL at 05:01

## 2019-01-07 RX ADMIN — GABAPENTIN 300 MG: 300 CAPSULE ORAL at 10:01

## 2019-01-07 RX ADMIN — Medication 12.5 MG: at 08:01

## 2019-01-07 RX ADMIN — HEPARIN SODIUM 5000 UNITS: 5000 INJECTION, SOLUTION INTRAVENOUS; SUBCUTANEOUS at 03:01

## 2019-01-07 RX ADMIN — SULFAMETHOXAZOLE AND TRIMETHOPRIM 1 TABLET: 400; 80 TABLET ORAL at 10:01

## 2019-01-07 NOTE — PT/OT/SLP PROGRESS
Occupational Therapy   Treatment/POC change    Name: Femi Enciso  MRN: [id number]  Admitting Diagnosis:  S/P liver transplant  14 Days Post-Op    Recommendations:     Discharge Recommendations: Rehab   Discharge Equipment Recommendations:  (TBD at next level of care)  Barriers to discharge:  None    Subjective     Pain/Comfort:  · Pain Rating 1: 0/10  · Pain Rating Post-Intervention 1: 0/10    Objective:     Communicated with: RN prior to session.  Patient found with all lines intact, call button in reach and RN notified and telemetry upon OT entry to room.    General Precautions: Standard, fall   Orthopedic Precautions:N/A   Braces: N/A     Occupational Performance:    Bed Mobility:    · NT, pt found seated UIC upon arrival.     Functional Mobility/Transfers:  · Patient completed Sit <> Stand Transfer with contact guard assistance ( from bedside commode) and minimum assistance ( from bedside chair)  with  rolling walker   · Patient completed Toilet Transfer Stand Pivot technique with contact guard assistance with  rolling walker  · Functional Mobility: Pt completed functional mobility from bedside chair <> bathroom using RW and CGA-min A for balance and safety. He tolerated well with no LOB or SOB.     Activities of Daily Living:  · Grooming: contact guard assistance to complete oral care while standig at sink ( pt standing ~3 minutes at sink)  · Upper Body Dressing: minimum assistance to milady gown like jacket while standing    AMPA 6 Click ADL: 19    Treatment & Education:  -Pt edu on OT role/POC  -Importance of OOB activity with staff assistance  -Safety during functional t/f and mobility  - White board updated  - Multiple self care tasks completed-- assistance level noted above  - All questions/concerns answered within OT scope of practice  - Discussed DME recommendations-- mother and pt verbalized understanding    Patient left up in chair with all lines intact, call button in reach and RN  notified  Education:    Assessment:     Femi Enciso is a 53 y.o. male with a medical diagnosis of S/P liver transplant. Pt alert and found seated UIC upon arrival. Pt motivated to participate in therapy session. He presents with the following performance deficits affecting function are weakness, impaired endurance, impaired balance, gait instability, impaired self care skills, impaired functional mobilty, impaired cardiopulmonary response to activity, edema, decreased lower extremity function. Pt demo's significant progress with activity tolerance and overall assistance with ADL/functional mobility. Pt is an excellent rehab candidate and would be able to tolerate 3hrs of therapy per day. He would benefit from IP rehab to continue to progress towards goals and improve quality of life.     Rehab Prognosis:  Good; patient would benefit from acute skilled OT services to address these deficits and reach maximum level of function.       Plan:     Patient to be seen 4 x/week to address the above listed problems via self-care/home management, therapeutic activities, therapeutic exercises, neuromuscular re-education  · Plan of Care Expires: 01/20/19  · Plan of Care Reviewed with: patient    This Plan of care has been discussed with the patient who was involved in its development and understands and is in agreement with the identified goals and treatment plan    GOALS:   Multidisciplinary Problems     Occupational Therapy Goals        Problem: Occupational Therapy Goal    Goal Priority Disciplines Outcome Interventions   Occupational Therapy Goal     OT, PT/OT Ongoing (interventions implemented as appropriate)    Description:  Goals to be met by: 1/16/19 ( Revised 1/7)    Patient will increase functional independence with ADLs by performing:    UE Dressing with Supervision.   LE Dressing with Minimal Assistance.  Grooming while standing at sink with Stand-by Assistance.   Toileting from toilet with Moderate Assistance for  hygiene and clothing management.   Toilet transfer to toilet with moderate assistance.  Upper extremity  theraband exercise program x  15 reps  with independence.                         Multidisciplinary Problems (Resolved)        Problem: Occupational Therapy Goal    Goal Priority Disciplines Outcome Interventions   Occupational Therapy Goal   (Resolved)     OT, PT/OT Resolved for Upgrade                    Time Tracking:     OT Date of Treatment: 01/07/19  OT Start Time: 0833  OT Stop Time: 0851  OT Total Time (min): 18 min    Billable Minutes:Therapeutic Activity 18    Julia ladd OT  1/7/2019

## 2019-01-07 NOTE — PROGRESS NOTES
[facility]  Physical Medicine & Rehab  Progress Note    Patient Name: Femi Enciso  MRN: [id number]  Admission Date: 10/27/2018  Length of Stay: 72 days  Attending Physician: Femi Patel MD    Subjective:     Principal Problem:S/P liver transplant    Hospital Course:   11/21/2018: Evaluated by therapy.  Bed mobility MaxA.  Sit to stand ModA x 2 ppl.  Ambulated 1 step MaxA.  UBD/LBD/toileting MaxA. Grooming Sanjay.  12/19/2018: Participated with therapy.  Bed mobility Sanjay.  Sit to stand Min-ModA and transfers ModA x 2 ppl.  No gait 2/2 nausea and dizziness during steps for trx.  UBD Sanjay and feeding (I).   12/20/2018: Participated with OT.  Sit to stand ModA x 2 ppl. No gait 2/2 anxiety.  Grooming Mod (I).   12/21/2018: Participated with PT.  Bed mobility Sanjay.  Sit to stand and transfers modA.  Ambulated ~3 steps modA.   12/24/2018: Participated with therapy.  Bed mobility CGA.  Sit to stand Min-ModA.  Ambulated-limited steps Min-MaxA x 2.  UBD Sanjay.  12/26/18: No therapy 2/2 dialysis.  12/27/2018: Participated with therapy.  Bed mobility CGA.  Sit to stand and transfers Sanjay.  Ambulated 3 ft Sanjay.  Pt refused all oob activities and ADL training. Pt requested OT to teach pt how to perform BUE exercises w/ theraband.  12/31/2019: Participated with therapy.  Bed mobility SBA-Sanjay.  Sit to stand Sanjay x 2 ppl.  Ambulated 20 ft Sanjay & RW.  UBD Sanjay and LBD/grooming setupA.  01/03/2019: Participated with therapy.  Bed mobility CGA.  Sit to stand CGA & RW and transfers Sanjay & RW.  Ambulated 56 ft CGA & RW.  Grooming SBA.  01/06/2019: Participated with therapy.  Bed mobility SV-Sanjay.  Sit to stand CGA-Sanjay and transfers CGA & RW.  Ambulated 150 ft CGA-Sanjay.  UBD Sanjay and LBD CGA. [place] tube removed for possible PEG placement today.     Interval History 1/7/2019:  Patient is seen for follow-up rehab evaluation and recommendations: [place] tube removed. EGD with possible PEG placement pending. Progressing with  therapy.     HPI, Past Medical, Family, and Social History remains the same as documented in the initial encounter.    Scheduled Medications:    aspirin  81 mg Oral Daily    dronabinol  2.5 mg Oral BID    epoetin sherlyn (PROCRIT) injection  50 Units/kg (Dosing Weight) Intravenous Every Mon, Wed, Fri    ergocalciferol  50,000 Units Oral Q7 Days    escitalopram oxalate  20 mg Oral Daily    fluconazole 40 mg/ml  200 mg Oral Daily    gabapentin  300 mg Oral Daily    heparin (porcine)  5,000 Units Subcutaneous Q8H    lidocaine  1 patch Transdermal Q24H    metoprolol  12.5 mg Per NG tube BID    mycophenolate mofetil  500 mg Oral BID    pantoprazole  40 mg Intravenous BID    sucralfate  1 g Oral Q8H    sulfamethoxazole-trimethoprim 400-80mg  1 tablet Oral Daily    tacrolimus  2 mg Oral BID    valganciclovir 50 mg/ml  450 mg Oral Daily       Diagnostic Results: Labs: Reviewed    PRN Medications: sodium chloride, acetaminophen, albuterol-ipratropium, aluminum & magnesium hydroxide-simethicone, artificial tears, bacitracin, bisacodyl, cyclobenzaprine, dextrose 50%, dextrose 50%, glucagon (human recombinant), glucose, glucose, heparin (porcine), insulin aspart U-100, ondansetron, traMADol    Review of Systems   Constitutional: Positive for activity change, appetite change and fatigue (improving).   HENT: Positive for trouble swallowing. Negative for voice change.    Respiratory: Negative for cough and shortness of breath.    Cardiovascular: Negative for chest pain and palpitations.   Gastrointestinal: Negative for nausea and vomiting.   Musculoskeletal: Positive for gait problem. Negative for arthralgias.   Skin: Positive for wound (surgical). Negative for color change.   Neurological: Positive for weakness.     Objective:     Vital Signs (Most Recent):  Temp: 98.3 °F (36.8 °C) (01/07/19 1205)  Pulse: 84 (01/07/19 0800)  Resp: (!) 25 (01/07/19 0800)  BP: 103/72 (01/07/19 0800)  SpO2: 99 % (01/07/19 0800)    Vital  Signs (24h Range):  Temp:  [97.7 °F (36.5 °C)-98.5 °F (36.9 °C)] 98.3 °F (36.8 °C)  Pulse:  [79-84] 84  Resp:  [20-25] 25  SpO2:  [99 %-100 %] 99 %  BP: (103-156)/() 103/72     Physical Exam   Constitutional: He is oriented to person, place, and time. He appears well-developed.   Mother at beside  Sitting in chair by window in hallway   HENT:   Head: Normocephalic and atraumatic.   Eyes: Right eye exhibits no discharge. Left eye exhibits no discharge. No scleral icterus.   Neck: Neck supple.   Cardiovascular: Normal rate and intact distal pulses.   Pulmonary/Chest: Effort normal. No respiratory distress.   Abdominal: Soft. He exhibits no distension.   Musculoskeletal: He exhibits no edema or deformity.   RUE: 5/5.  LUE: 5/5.  RLE: 3/5.  LLE: 3/5.   Neurological: He is alert and oriented to person, place, and time. No sensory deficit.   Skin: Skin is warm and dry.   Psychiatric: He has a normal mood and affect. His behavior is normal.   Assessment/Plan:      * S/P liver transplant    -s/p liver trx on 11/11 for alcoholic cirrhosis       Other dysphagia    -see decreased appetite     Impaired functional mobility and endurance    -2/2 deconditioning from liver trx     See hospital course for functional, cognitive/speech/language, and nutrition/swallow status.      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  -repeat US on 1/11/19 per LTS     Severe malnutrition    -see decreased appetite     Decreased appetite    -  Decreased PO intake  -  s/p EGD 12/24->antrum and duodenal bulb bx  -  prabhakar tube placed afterwards-->TF started for nutrition  -  Appears more energetic with TFs  - GI reconsulted  -  Started on Marinol 1/3  -  unable to tolerate food for gastric emptying study.   -  [place] tube removed-->EGD with possible PEG placement pending for today       Acute renal failure with acute tubular necrosis superimposed on stage 3 chronic kidney disease    -Nephrology following   -RRT held at this time, last HD 12/26  -kidney function improving          Pleural effusion associated with hepatic disorder    -dyspnea on 1/1/19--> CXR showed moderated edema.   - Lasix 60 mg IV on 1/1 and 1/2/19 with improvement  -on RA     Recommend Inpatient Rehab.  IRF preference per patient/[facility].  Barriers to IRF admission:  Medical stability (nutritional plan).       Mela Scanlon NP  Department of Physical Medicine & Rehab   [facility]

## 2019-01-07 NOTE — SUBJECTIVE & OBJECTIVE
Interval History 1/7/2019:  Patient is seen for follow-up rehab evaluation and recommendations: [place] tube removed. EGD with possible PEG placement pending. Progressing with therapy.     HPI, Past Medical, Family, and Social History remains the same as documented in the initial encounter.    Scheduled Medications:    aspirin  81 mg Oral Daily    dronabinol  2.5 mg Oral BID    epoetin sherlyn (PROCRIT) injection  50 Units/kg (Dosing Weight) Intravenous Every Mon, Wed, Fri    ergocalciferol  50,000 Units Oral Q7 Days    escitalopram oxalate  20 mg Oral Daily    fluconazole 40 mg/ml  200 mg Oral Daily    gabapentin  300 mg Oral Daily    heparin (porcine)  5,000 Units Subcutaneous Q8H    lidocaine  1 patch Transdermal Q24H    metoprolol  12.5 mg Per NG tube BID    mycophenolate mofetil  500 mg Oral BID    pantoprazole  40 mg Intravenous BID    sucralfate  1 g Oral Q8H    sulfamethoxazole-trimethoprim 400-80mg  1 tablet Oral Daily    tacrolimus  2 mg Oral BID    valganciclovir 50 mg/ml  450 mg Oral Daily       Diagnostic Results: Labs: Reviewed    PRN Medications: sodium chloride, acetaminophen, albuterol-ipratropium, aluminum & magnesium hydroxide-simethicone, artificial tears, bacitracin, bisacodyl, cyclobenzaprine, dextrose 50%, dextrose 50%, glucagon (human recombinant), glucose, glucose, heparin (porcine), insulin aspart U-100, ondansetron, traMADol    Review of Systems   Constitutional: Positive for activity change, appetite change and fatigue (improving).   HENT: Positive for trouble swallowing. Negative for voice change.    Respiratory: Negative for cough and shortness of breath.    Cardiovascular: Negative for chest pain and palpitations.   Gastrointestinal: Negative for nausea and vomiting.   Musculoskeletal: Positive for gait problem. Negative for arthralgias.   Skin: Positive for wound (surgical). Negative for color change.   Neurological: Positive for dizziness and weakness.     Objective:      Vital Signs (Most Recent):  Temp: 98.3 °F (36.8 °C) (01/07/19 1205)  Pulse: 84 (01/07/19 0800)  Resp: (!) 25 (01/07/19 0800)  BP: 103/72 (01/07/19 0800)  SpO2: 99 % (01/07/19 0800)    Vital Signs (24h Range):  Temp:  [97.7 °F (36.5 °C)-98.5 °F (36.9 °C)] 98.3 °F (36.8 °C)  Pulse:  [79-84] 84  Resp:  [20-25] 25  SpO2:  [99 %-100 %] 99 %  BP: (103-156)/() 103/72     Physical Exam   Constitutional: He is oriented to person, place, and time. He appears well-developed.   Mother at beside  Sitting in chair by window in hallway   HENT:   Head: Normocephalic and atraumatic.   Eyes: Right eye exhibits no discharge. Left eye exhibits no discharge. No scleral icterus.   Neck: Neck supple.   Cardiovascular: Normal rate and intact distal pulses.   Pulmonary/Chest: Effort normal. No respiratory distress.   Abdominal: Soft. He exhibits no distension.   Musculoskeletal: He exhibits no edema or deformity.   RUE: 5/5.  LUE: 5/5.  RLE: 3/5.  LLE: 3/5.   Neurological: He is alert and oriented to person, place, and time. No sensory deficit.        Skin: Skin is warm and dry.   Psychiatric: He has a normal mood and affect. His behavior is normal.     NEUROLOGICAL EXAMINATION:     MENTAL STATUS   Oriented to person, place, and time.

## 2019-01-07 NOTE — PT/OT/SLP PROGRESS
"Physical Therapy Treatment    Patient Name:  Femi Enciso   MRN:  [id number]    Recommendations:     Discharge Recommendations:  rehabilitation facility   Discharge Equipment Recommendations: (TBD at next level of care.)   Barriers to discharge: Inaccessible home and Decreased caregiver support    Assessment:     Femi Enciso is a 53 y.o. male admitted with a medical diagnosis of S/P liver transplant.  He presents with the following impairments/functional limitations:  weakness, impaired endurance, impaired self care skills, impaired functional mobilty, impaired balance, decreased lower extremity function, impaired cardiopulmonary response to activity, gait instability, decreased coordination . Patient with significant difficulty transferring from sit to stand today and mod instability during gait training as Patient reported that his legs felt weaker today.    Rehab Prognosis: Fair; patient would benefit from acute skilled PT services to address these deficits and reach maximum level of function.    Recent Surgery: Procedure(s) (LRB):  EGD (ESOPHAGOGASTRODUODENOSCOPY) (N/A) 14 Days Post-Op    Plan:     During this hospitalization, patient to be seen 4 x/week to address the identified rehab impairments via gait training, therapeutic activities, therapeutic exercises, neuromuscular re-education and progress toward the following goals:    · Plan of Care Expires:  01/17/19    Subjective     Chief Complaint: B LE weakness  Patient states " These legs are weaker today".  Patient/Family Comments/goals: to get stronger and eventually to go back home.  Pain/Comfort:  · Pain Rating 1: 0/10  · Pain Rating Post-Intervention 1: 0/10      Objective:     Communicated with nsg prior to session.  Patient found all lines intact, call button in reach and mother present telemetry  upon PT entry to room.     General Precautions: Standard, fall   Orthopedic Precautions:N/A   Braces: N/A     Functional Mobility:  · Bed Mobility:     · Rolling " Left:  minimum assistance  · Scooting: stand by assistance  · Supine to Sit: minimum assistance  · Sit to Supine: stand by assistance  · Transfers:     · Sit to Stand:  maximal assistance with rolling walker  · Bed to Chair: minimum assistance and moderate assistance with  rolling walker  using  Stand Pivot  · Gait: ~ 10 ft and 38 ft with RW and min assistance with chair follow.      AM-PAC 6 CLICK MOBILITY  Turning over in bed (including adjusting bedclothes, sheets and blankets)?: 3  Sitting down on and standing up from a chair with arms (e.g., wheelchair, bedside commode, etc.): 2  Moving from lying on back to sitting on the side of the bed?: 3  Moving to and from a bed to a chair (including a wheelchair)?: 2  Need to walk in hospital room?: 3  Climbing 3-5 steps with a railing?: 1  Basic Mobility Total Score: 14       Therapeutic Activities and Exercises:   Patient sat at EOB to prepare for treatment. Patient transferred from bed<>bedisde chair with RW and min to mod assistance. Patient was transported to the hallway on bedside chair to perform gait training. There ex in sitting: LAQ, HIP FLEX AND HEEL/TOE RAISES 2X12 REPS B LE.    Patient left with bed in chair position with all lines intact, call button in reach and MOTHER present..    GOALS:   Multidisciplinary Problems     Physical Therapy Goals        Problem: Physical Therapy Goal    Goal Priority Disciplines Outcome Goal Variances Interventions   Physical Therapy Goal     PT, PT/OT Ongoing (interventions implemented as appropriate)     Description:  Goals to be met by: 2018     Patient will increase functional independence with mobility by performin. Supine to sit with Modified [place] -not met  2. Sit to stand transfer with [place] -not met  3. Bed to chair transfer with independence using no AD -not met  4. Gait  x150 feet with Supervision using LRAD. -not met  5. Lower extremity exercise program x20 reps per handout, with  independence -not met                       Multidisciplinary Problems (Resolved)        Problem: Physical Therapy Goal    Goal Priority Disciplines Outcome Goal Variances Interventions   Physical Therapy Goal   (Resolved)     PT, PT/OT Resolved for Upgrade                     Time Tracking:     PT Received On: 01/07/19  PT Start Time: 1113     PT Stop Time: 1152  PT Total Time (min): 39 min     Billable Minutes: Gait Training 10, Therapeutic Activity 21 and Therapeutic Exercise 8    Treatment Type: Treatment  PT/PTA: PTA     PTA Visit Number: 1     Uriel Owens, HENNY  01/07/2019

## 2019-01-07 NOTE — ASSESSMENT & PLAN NOTE
- 2 weeks for DEL prior to transplant.  - Renal function slow to recover post-op.   - Nephrology consulted for management.   - Cont with HD as per nephrology recs.  Apprec recs.    - Lasix 160 mg challenge 11/28 w/ minimal output.    - HD MWF. Tolerated well 12/26.    - Held HD on 12/28.   - Strict I&Os.   - Albumin 25% x 3 doses 12/28.  - Renal function has since recovered and Cr level WNL.    - Has not required HD since Wednesday 12/26.

## 2019-01-07 NOTE — PROGRESS NOTES
[facility]  Liver Transplant  Progress Note    Patient Name: Femi Enciso  MRN: [id number]  Admission Date: 10/27/2018  Hospital Length of Stay: 72 days  Code Status: Full Code  Primary Care Provider: Primary Doctor No  Post-Operative Day: 57    ORGAN:   LIVER  Disease Etiology: Alcoholic Cirrhosis  Donor Type:    - Brain Death  CDC High Risk:   No  Donor CMV Status:   Donor CMV Status: Positive  Donor HBcAB:   Negative  Donor HCV Status:   Negative  Donor HBV JAVIER: Negative  Donor HCV JAVIER: Negative  Whole or Partial: Whole Liver  Biliary Anastomosis: End to End  Arterial Anatomy: Standard  Subjective:     History of Present Illness:  Femi Enciso is a 53yr old male with PMH of acute ETOH hepatitis and DEL/ATN evolved to ESRD requiring HD (not candidate for KTX). He is now s/p liver transplant (SM induction, DBD, CMV D+/R-). Transplant surgery noted severe collateralization, adrenal gland firmly adhered to liver. Bare area of adrenal gland required several stitches and packing to obtain fair hemostasis (EBL 15L). OR take back  for bleeding from R adrenal bed in AM (significant clot in retroperitoneum, posterior to R hepatic lobe, tract posterior to the R kidney containing significant clot, small bleeding area of retroperitoneal fat) then returned to surgery same day for hemorrhaging closed with wound vac with plans to return to OR 24-48 hours for closure (retroperitoneal /retrorenal/retrocolic hematoma on the right ). Raw retroperitoneal bleeding. Suture ligatures placed, argon beam cautery, evarest placed in retroperitoneum behind cava and right kidney. Significant oozing from upper right adrenal gland, not amenable to ligation, that portion of the adrenal gland was resected with cautery). OR take back  for washout and incisional closure, no significant bleeding or hematoma found. OR cultures   from body fluid grew enterococcus faecium VRE. ID was consulted,  started on  daptomycin for VRE treatment and cefepime/flagyl to cover for IA ty in bile. Patient with significant leukocytosis with peak on 11/22 at 48K prompting drainage of R subphrenic fluid collection, perc drain placed, culture grew VRE. Stroke code called 11/21 for lethargy and unresponsive, CT head without evidence of acute ischemic changes and CTA chest negative, vascular neurology was consulted recommended MRI brain, but patient's AMS improved shortly after event. Nephrology consulted for ESRD requiring HD. Patient overall improved on antibiotic regimen, leukocytosis and AMS improved. He was transferred to TSU on POD #15.     Hospital Course:  Pt c/o CP 12/21. EKG stable from prior. Troponin wnl. CP resolved 12/22. CXR 12/20 showed no detrimental change. Pt hypertensive prior to HD.  Dialysis tolerated well 12/21 with 1.5L taken off. Bps much improved after dialysis, but monitoring closely.    Interval History: no acute events overnight. Pt reports feeling well this am. No nausea noted this am and pt reporting increased appetite. Given slight improvement will hold off on EGD/PEG tube placement this am and will allow pt to attempt to maintain nutrition with PO intake. Quentin tube accidentally removed 1/6/19 and has been out since. Pt remains with back pain and Zanaflex recently changed to Flexeril with some improvement. Cont Marinol. Cont Carafate. Cr level stable.  Good UOP.  Last HD 12/26/18.  Responded appropriately to blood transfusion 1/4/19. Continue aggressive PT daily.     Scheduled Meds:   aspirin  81 mg Oral Daily    dronabinol  2.5 mg Oral BID    epoetin sherlyn (PROCRIT) injection  50 Units/kg (Dosing Weight) Intravenous Every Mon, Wed, Fri    ergocalciferol  50,000 Units Oral Q7 Days    escitalopram oxalate  20 mg Oral Daily    fluconazole 40 mg/ml  200 mg Oral Daily    gabapentin  300 mg Oral Daily    heparin (porcine)  5,000 Units Subcutaneous Q8H    lidocaine  1 patch Transdermal Q24H     metoprolol  12.5 mg Per NG tube BID    mycophenolate mofetil  500 mg Oral BID    pantoprazole  40 mg Intravenous BID    sucralfate  1 g Oral Q8H    sulfamethoxazole-trimethoprim 400-80mg  1 tablet Oral Daily    tacrolimus  2 mg Oral BID    valganciclovir 50 mg/ml  450 mg Oral Daily     Continuous Infusions:  PRN Meds:sodium chloride, acetaminophen, albuterol-ipratropium, aluminum & magnesium hydroxide-simethicone, artificial tears, bacitracin, bisacodyl, cyclobenzaprine, dextrose 50%, dextrose 50%, glucagon (human recombinant), glucose, glucose, heparin (porcine), insulin aspart U-100, ondansetron, traMADol    Review of Systems   Constitutional: Positive for activity change and appetite change (improving ). Negative for chills, fatigue and fever.   HENT: Negative for congestion, facial swelling, sore throat and voice change.    Eyes: Negative for pain, discharge and visual disturbance.   Respiratory: Negative for apnea, cough, choking, chest tightness, shortness of breath and wheezing.    Cardiovascular: Negative for chest pain, palpitations and leg swelling.   Gastrointestinal: Negative for abdominal distention, abdominal pain, constipation, diarrhea, nausea and vomiting.   Endocrine: Negative.    Genitourinary: Negative for difficulty urinating, dysuria, hematuria and urgency.   Musculoskeletal: Negative.  Negative for back pain, gait problem, neck pain and neck stiffness.   Skin: Positive for wound. Negative for color change and pallor.   Allergic/Immunologic: Positive for immunocompromised state.   Neurological: Positive for weakness. Negative for dizziness, seizures, light-headedness and headaches.   Hematological: Negative.    Psychiatric/Behavioral: Negative for behavioral problems, confusion, decreased concentration, dysphoric mood, hallucinations, sleep disturbance and suicidal ideas. The patient is not nervous/anxious.      Objective:     Vital Signs (Most Recent):  Temp: 98.3 °F (36.8 °C) (01/07/19  1205)  Pulse: 84 (01/07/19 0800)  Resp: (!) 25 (01/07/19 0800)  BP: 103/72 (01/07/19 0800)  SpO2: 99 % (01/07/19 0800) Vital Signs (24h Range):  Temp:  [97.7 °F (36.5 °C)-98.5 °F (36.9 °C)] 98.3 °F (36.8 °C)  Pulse:  [79-84] 84  Resp:  [20-25] 25  SpO2:  [99 %-100 %] 99 %  BP: (103-156)/() 103/72     Weight: 65.2 kg (143 lb 11.8 oz)  Body mass index is 20.62 kg/m².    Intake/Output - Last 3 Shifts       01/05 0700 - 01/06 0659 01/06 0700 - 01/07 0659 01/07 0700 - 01/08 0659    P.O. 280 400     I.V. (mL/kg)  50 (0.8)     Blood       NG/      Total Intake(mL/kg) 1200 (17.6) 450 (6.9)     Urine (mL/kg/hr) 1490 (0.9) 825 (0.5)     Stool 0 0     Total Output 1490 825     Net -290 -375            Urine Occurrence 1 x      Stool Occurrence 2 x 1 x           Physical Exam   Constitutional: He is oriented to person, place, and time. He appears well-developed. No distress.   Temporal and distal extremity muscle wasting noted.   HENT:   Head: Normocephalic and atraumatic.   Mouth/Throat: Oropharynx is clear and moist. No oropharyngeal exudate.   Eyes: EOM are normal.   Neck: Normal range of motion. Neck supple. No JVD present.   Cardiovascular: Normal rate, regular rhythm, normal heart sounds and intact distal pulses.   No murmur heard.  Pulmonary/Chest: Effort normal. No respiratory distress. He has decreased breath sounds in the right lower field and the left lower field. He has no wheezes. He exhibits no tenderness.   Abdominal: Soft. Bowel sounds are normal. He exhibits no distension. There is no tenderness.   Chevron inc clean, dry, intact with steri strips in place     Musculoskeletal: Normal range of motion. He exhibits no edema or tenderness.   Neurological: He is alert and oriented to person, place, and time. He has normal reflexes.   Skin: Skin is warm and dry. Capillary refill takes 2 to 3 seconds. He is not diaphoretic. No pallor.   Psychiatric: He has a normal mood and affect. His behavior is normal.  Thought content normal. His affect is not labile. He is not slowed. Cognition and memory are not impaired.   Nursing note and vitals reviewed.      Laboratory:  Immunosuppressants         Stop Route Frequency     tacrolimus capsule 2 mg      -- Oral 2 times daily     mycophenolate mofetil 200 mg/mL suspension 500 mg      -- Oral 2 times daily        CBC:   Recent Labs   Lab 01/07/19  0646   WBC 8.33   RBC 2.96*   HGB 8.5*   HCT 27.2*   *   MCV 92   MCH 28.7   MCHC 31.3*     CMP:   Recent Labs   Lab 01/07/19  0646   GLU 93   CALCIUM 9.2   ALBUMIN 2.3*   PROT 6.4      K 3.8   CO2 27      BUN 48*   CREATININE 1.1   ALKPHOS 162*   ALT 19   AST 17   BILITOT 0.9     Labs within the past 24 hours have been reviewed.    Diagnostic Results:  I have personally reviewed all pertinent imaging studies.    Assessment/Plan:     * S/P liver transplant    - LFTs now improving slowly.  T bili was 37.6 day of transplant.  - Drain placed during take back. Drain placed to intra-abdominal abscess on 11/22.   - Fluid from collection noted with enterococcus VRE completed Zyvox treatment.  - Routine 1 month Liver US 12/17 which showed elevated velocity of hepatic artery and low RIs.   - Vascular Surgery consulted. Recommend repeat US in 10-14 days (12/27-12/31) to reassess. Appreciate Vascular recs.  LFTs stable.  - Liver US 12/28 to reassess HAS revealed elevated but slightly improved main hepatic artery velocity and mildly improved RIs.   - Will repeat US in 2 weeks (1/11/19).        Weakness    - Pt remains significantly debilitated.   - PT/OT for strengthening.   - Currently will need SNF for placement and further rehabilitation.   - Pt remains severely debilitated and not strong enough for SNF at this time.    - SNF consult placed 12/17. However, denied for SNF again as not strong enough at this time.  - Rehab consulted.  We now have a contract w [facility] for rehab.  Rehab willing to accept patient when medically  appropriate.    - Continue aggressive therapy.     Acute renal failure with acute tubular necrosis superimposed on stage 3 chronic kidney disease    - 2 weeks for DEL prior to transplant.  - Renal function slow to recover post-op.   - Nephrology consulted for management.   - Cont with HD as per nephrology recs.  Apprec recs.    - Lasix 160 mg challenge 11/28 w/ minimal output.    - HD MWF. Tolerated well 12/26.    - Held HD on 12/28.   - Strict I&Os.   - Albumin 25% x 3 doses 12/28.  - Renal function has since recovered and Cr level WNL.    - Has not required HD since Wednesday 12/26.        Anemia of chronic disease    - Last liver US 11/27. Evolving peritransplant hematomas with anechoic fluid collection adjacent to the right hepatic lobe.  Small volume perihepatic free fluid.  - Chest/Abd/pelvis CT 12/4 - no fluid to be drainged per transplant surgeon.  No source of bleeding.    - H&H slightly low 1/4/19.  Transfused w appropriate response.  No evidence of overt bleeding.  Monitor.       At risk for opportunistic infections    - Cont with bactrim and valcyte for protection against opportunistic infections.   - Cont fluconazole for fungal prophylaxis.     Long-term use of immunosuppressant medication    - Cont with prograf. Draw prograf level daily and adjust dose as needed to maintain a therapeutic level.   - restarted MMF on 12/29.       Prophylactic immunotherapy    - See long term immunosuppression.        Type 2 diabetes mellitus without complication    - Endocrine consulted for management. Appreciate recs.   - Hypoglycemia 12/10 requiring D50.    - Repeat cx 12/11 - no growth.  - Blood glucose readings well controlled on tube feeds.     Back pain, chronic    - Chronic.  Zanaflex changed to Flexeril 1/5/19.   - Cont Lidocaine patches,  Encourage OOB.         Dyspnea    - pt with sob and chest discomfort 1/1/19.  - RA sats %.  - chest xray showed moderate edema.  - cardiac enzymes and ekg negative on 1/1  "and 1/2.  - Lasix 60 mg IV given x 2 (last dose on 1/2) with appropriate response.  - ABGs stable. Reviewed with staff.   - Resp status overall improved now.      Other dysphagia    - pt reports difficulty swallowing pills.  - SLP consult, appreciate recs.  - switched PO meds that pt is unable to swallow to liquid/IV.  - Dysphagia has since improved but pt now with persistent nausea limiting PO intake.   - no mechanical deficit noted.     Hypokalemia    - Continue to monitor and replace as needed.     Acute kidney failure with lesion of tubular necrosis    - Previously on Dialysis MWF. On Hold for now as kidneys recovering.  - Last HD 12/26/18.  - Good UOP and Cr level now WNL.   - Nephrology following, appreciate recs.  - Monitor.     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       Hypomagnesemia    - Replace Mag PRN.      Severe malnutrition    - Dietician following. Severe temporal, clavicle muscle depletion noted. Severe subq fat loss.  - Nutrition has been poor over the past 24 hours.   - Discussed at length with patient and caregivers that if patient's nutrition status does not improve, will need supplemental nutrition via tube feeds or TPN.  - Appetite stimulant started 12/19.   - Caregivers to bring in outside food for patient.   - Continue calorie count.  - EGD Monday 12/24 as pt c/o poor appetite + burning in chest/esophagus and occasionally vomiting after eating x several weeks.   - EGD 12/24 unremarkable. prabhakar tube placed afterwards.   - TF started for nutrition 12/24/18. TF at goal rate, 50 ml/hr.  - Prealbumin 7 on 12/26. Repeat 9 on 1/3.  - Started scheduled remeron 12/31.   - dc remeron and start marinol 1/3.  - Cont to watch pt on marinol at this time.   - Pt with improvement in nausea after start of Carafate.   - Plan to hold off on EGD/PEG placement at this time as pt's appetite slowly improving.      Non-intractable vomiting with nausea    - " Intermittent, worse over past 10 days,  Changed Pepcid to Protonix 12/9/18.  Appetite seems to be slowly improving.    - Encourage multiple, small meals and cont PRN anti-emetics.    - GI consulted.  EGD 12/24 w normal esophagus, erythematous mucosa in the antrum and nodular mucosa in the duodenal bulb w biopsies.  Path pending.  - JHON placed 12/24.  Pt tolerating tube feeds.  Denies nausea w TF.    - Pt only tolerating very minimal po intake.  - GI re-consulted. pt unable to tolerate food for gastric emptying study.   - Carafate started on 1/4/19 as per GI recs as symptoms could be bile reflux.   - Nausea now noted with improvement and appetite slowly improving.   - Will hold off on EGD/PEG placement as pt will attempt to get nutrition in PO.      Anxiety    - Restarted Lexapro as per psych recs, 12/13. Pt also on Marinol.    - Monitor.     Prolonged Q-T interval on ECG    -  ms on EKG 12/19.  -  on EKG 12/21.  - Monitor.       Alcohol use disorder, severe, in early remission, dependence    - Monitor.       Decreased appetite    - Continue appetite stimulant.  - GI consulted as pt c/o burning sensation in chest/esophagus + vomiting after eating, passed SLP eval, recommended GI f/u.  - Cont to encourage PO intake. Ordered additional supplements.   - EGD 12/24, unremarkable. jhon tube placed and TF started. Increased TF to 45 ml, new goal rate 50 ml/hr.  - Prealbumin 7 on 12/26. Repeat prealbumin 9 on 1/3.  - started remeron 12/31.   - dc remeron and start marinol 1/3/19.   - Nausea now improving with the start of Carafate.   - Appetite also increasing and pt able to eat breakfast 1/7/19.   - Encourage PO intake and hold off on GI intervention at this time.      Overweight (BMI 25.0-29.9)           Adrenal cortical steroids causing adverse effect in therapeutic use    - Monitor.       Pleural effusion associated with hepatic disorder    - c/o increased pain w deep breath today to right side.  CXR showed  increased opacity in the inferior hemothorax on the right side since 11/26/2018.  Pulse ox 97-99% on RA.  Cont to monitor.   - continues to have fluid to RLL.  WBC remains elevated.    - thoracentesis performed on 11/30. Fluid noted with 4k WBC, 93 SEGS. cx no growth to date.  Cefepime added for treatment.  - Chest CT showing right pleural effusion improved, left w increased pleural effusion.   - Per ID, completed treatment of empyema w Cefepime thru 12/8.  - sats remain 97-99% on RA.  - CXR 12/20 showed left hemidiaphragmatic margin that is better delineated than on 12/19/2018, consistent with improved aeration in the left lower lobe. + no significant detrimental interval change in the appearance of the chest, with the current examination demonstrating a slightly improved inspiratory depth level.    - Denies SOB.  Last CXR 12/22 w improvement.  - Pt with dyspnea starting 1/1/19. RA sats %. Chest xray showed moderated edema.   - Lasix 60 mg IV on 1/1 and 1/2/19.   - Now improved.         VTE Risk Mitigation (From admission, onward)        Ordered     heparin (porcine) injection 5,000 Units  Every 8 hours      12/24/18 0742     heparin (porcine) injection 1,000 Units  As needed (PRN)      12/12/18 1731     IP VTE HIGH RISK PATIENT  Once      11/11/18 1208          The patients clinical status was discussed at multidisplinary rounds, involving transplant surgery, transplant medicine, pharmacy, nursing, nutrition, and social work    Discharge Planning:  Not a candidate for d/c at this time.     Kevin Miranda NP  Liver Transplant  [facility]

## 2019-01-07 NOTE — ASSESSMENT & PLAN NOTE
- Pt remains significantly debilitated.   - PT/OT for strengthening.   - Currently will need SNF for placement and further rehabilitation.   - Pt remains severely debilitated and not strong enough for SNF at this time.    - SNF consult placed 12/17. However, denied for SNF again as not strong enough at this time.  - Rehab consulted.  We now have a contract w [facility] for rehab.  Rehab willing to accept patient when medically appropriate.    - Continue aggressive therapy.

## 2019-01-07 NOTE — ASSESSMENT & PLAN NOTE
- Intermittent, worse over past 10 days,  Changed Pepcid to Protonix 12/9/18.  Appetite seems to be slowly improving.    - Encourage multiple, small meals and cont PRN anti-emetics.    - GI consulted.  EGD 12/24 w normal esophagus, erythematous mucosa in the antrum and nodular mucosa in the duodenal bulb w biopsies.  Path pending.  - JHON placed 12/24.  Pt tolerating tube feeds.  Denies nausea w TF.    - Pt only tolerating very minimal po intake.  - GI re-consulted. pt unable to tolerate food for gastric emptying study.   - Carafate started on 1/4/19 as per GI recs as symptoms could be bile reflux.   - Nausea now noted with improvement and appetite slowly improving.   - Will hold off on EGD/PEG placement as pt will attempt to get nutrition in PO.

## 2019-01-07 NOTE — PLAN OF CARE
Problem: Occupational Therapy Goal  Goal: Occupational Therapy Goal  Goals to be met by: 1/16/19 ( Revised 1/7)    Patient will increase functional independence with ADLs by performing:    UE Dressing with Supervision.   LE Dressing with Minimal Assistance.  Grooming while standing at sink with Stand-by Assistance.   Toileting from toilet with Moderate Assistance for hygiene and clothing management.   Toilet transfer to toilet with moderate assistance.  Upper extremity  theraband exercise program x  15 reps  with independence.             Outcome: Ongoing (interventions implemented as appropriate)  Pt progressing well with goals. Continue with POC.   Julia ladd OT  1/7/2019

## 2019-01-07 NOTE — PLAN OF CARE
Problem: Physical Therapy Goal  Goal: Physical Therapy Goal  Goals to be met by: 2018     Patient will increase functional independence with mobility by performin. Supine to sit with Modified [place] -not met  2. Sit to stand transfer with [place] -not met  3. Bed to chair transfer with independence using no AD -not met  4. Gait  x150 feet with Supervision using LRAD. -not met  5. Lower extremity exercise program x20 reps per handout, with independence -not met            Outcome: Ongoing (interventions implemented as appropriate)  Inconsistent ability to transfer and to ambulate noted. Decreased walking range and transfer ability noted today.

## 2019-01-07 NOTE — SUBJECTIVE & OBJECTIVE
Scheduled Meds:   aspirin  81 mg Oral Daily    dronabinol  2.5 mg Oral BID    epoetin sherlyn (PROCRIT) injection  50 Units/kg (Dosing Weight) Intravenous Every Mon, Wed, Fri    ergocalciferol  50,000 Units Oral Q7 Days    escitalopram oxalate  20 mg Oral Daily    fluconazole 40 mg/ml  200 mg Oral Daily    gabapentin  300 mg Oral Daily    heparin (porcine)  5,000 Units Subcutaneous Q8H    lidocaine  1 patch Transdermal Q24H    metoprolol  12.5 mg Per NG tube BID    mycophenolate mofetil  500 mg Oral BID    pantoprazole  40 mg Intravenous BID    sucralfate  1 g Oral Q8H    sulfamethoxazole-trimethoprim 400-80mg  1 tablet Oral Daily    tacrolimus  2 mg Oral BID    valganciclovir 50 mg/ml  450 mg Oral Daily     Continuous Infusions:  PRN Meds:sodium chloride, acetaminophen, albuterol-ipratropium, aluminum & magnesium hydroxide-simethicone, artificial tears, bacitracin, bisacodyl, cyclobenzaprine, dextrose 50%, dextrose 50%, glucagon (human recombinant), glucose, glucose, heparin (porcine), insulin aspart U-100, ondansetron, traMADol    Review of Systems   Constitutional: Positive for activity change and appetite change (improving ). Negative for chills, fatigue and fever.   HENT: Negative for congestion, facial swelling, sore throat and voice change.    Eyes: Negative for pain, discharge and visual disturbance.   Respiratory: Negative for apnea, cough, choking, chest tightness, shortness of breath and wheezing.    Cardiovascular: Negative for chest pain, palpitations and leg swelling.   Gastrointestinal: Negative for abdominal distention, abdominal pain, constipation, diarrhea, nausea and vomiting.   Endocrine: Negative.    Genitourinary: Negative for difficulty urinating, dysuria, hematuria and urgency.   Musculoskeletal: Negative.  Negative for back pain, gait problem, neck pain and neck stiffness.   Skin: Positive for wound. Negative for color change and pallor.   Allergic/Immunologic: Positive for  immunocompromised state.   Neurological: Positive for weakness. Negative for dizziness, seizures, light-headedness and headaches.   Hematological: Negative.    Psychiatric/Behavioral: Negative for behavioral problems, confusion, decreased concentration, dysphoric mood, hallucinations, sleep disturbance and suicidal ideas. The patient is not nervous/anxious.      Objective:     Vital Signs (Most Recent):  Temp: 98.3 °F (36.8 °C) (01/07/19 1205)  Pulse: 84 (01/07/19 0800)  Resp: (!) 25 (01/07/19 0800)  BP: 103/72 (01/07/19 0800)  SpO2: 99 % (01/07/19 0800) Vital Signs (24h Range):  Temp:  [97.7 °F (36.5 °C)-98.5 °F (36.9 °C)] 98.3 °F (36.8 °C)  Pulse:  [79-84] 84  Resp:  [20-25] 25  SpO2:  [99 %-100 %] 99 %  BP: (103-156)/() 103/72     Weight: 65.2 kg (143 lb 11.8 oz)  Body mass index is 20.62 kg/m².    Intake/Output - Last 3 Shifts       01/05 0700 - 01/06 0659 01/06 0700 - 01/07 0659 01/07 0700 - 01/08 0659    P.O. 280 400     I.V. (mL/kg)  50 (0.8)     Blood       NG/      Total Intake(mL/kg) 1200 (17.6) 450 (6.9)     Urine (mL/kg/hr) 1490 (0.9) 825 (0.5)     Stool 0 0     Total Output 1490 825     Net -290 -375            Urine Occurrence 1 x      Stool Occurrence 2 x 1 x           Physical Exam   Constitutional: He is oriented to person, place, and time. He appears well-developed. No distress.   Temporal and distal extremity muscle wasting noted.   HENT:   Head: Normocephalic and atraumatic.   Mouth/Throat: Oropharynx is clear and moist. No oropharyngeal exudate.   Eyes: EOM are normal.   Neck: Normal range of motion. Neck supple. No JVD present.   Cardiovascular: Normal rate, regular rhythm, normal heart sounds and intact distal pulses.   No murmur heard.  Pulmonary/Chest: Effort normal. No respiratory distress. He has decreased breath sounds in the right lower field and the left lower field. He has no wheezes. He exhibits no tenderness.   Abdominal: Soft. Bowel sounds are normal. He exhibits no  distension. There is no tenderness.   Chevron inc clean, dry, intact with steri strips in place     Musculoskeletal: Normal range of motion. He exhibits no edema or tenderness.   Neurological: He is alert and oriented to person, place, and time. He has normal reflexes.   Skin: Skin is warm and dry. Capillary refill takes 2 to 3 seconds. He is not diaphoretic. No pallor.   Psychiatric: He has a normal mood and affect. His behavior is normal. Thought content normal. His affect is not labile. He is not slowed. Cognition and memory are not impaired.   Nursing note and vitals reviewed.      Laboratory:  Immunosuppressants         Stop Route Frequency     tacrolimus capsule 2 mg      -- Oral 2 times daily     mycophenolate mofetil 200 mg/mL suspension 500 mg      -- Oral 2 times daily        CBC:   Recent Labs   Lab 01/07/19  0646   WBC 8.33   RBC 2.96*   HGB 8.5*   HCT 27.2*   *   MCV 92   MCH 28.7   MCHC 31.3*     CMP:   Recent Labs   Lab 01/07/19  0646   GLU 93   CALCIUM 9.2   ALBUMIN 2.3*   PROT 6.4      K 3.8   CO2 27      BUN 48*   CREATININE 1.1   ALKPHOS 162*   ALT 19   AST 17   BILITOT 0.9     Labs within the past 24 hours have been reviewed.    Diagnostic Results:  I have personally reviewed all pertinent imaging studies.

## 2019-01-07 NOTE — ASSESSMENT & PLAN NOTE
-  Decreased PO intake  -  s/p EGD 12/24->antrum and duodenal bulb bx  -  prabhakar tube placed afterwards-->TF started for nutrition  -  Appears more energetic with TFs  - GI reconsulted  -  Started on Marinol 1/3  -  unable to tolerate food for gastric emptying study.   -  [place] tube removed-->EGD with possible PEG placement pending for today

## 2019-01-07 NOTE — ASSESSMENT & PLAN NOTE
- Dietician following. Severe temporal, clavicle muscle depletion noted. Severe subq fat loss.  - Nutrition has been poor over the past 24 hours.   - Discussed at length with patient and caregivers that if patient's nutrition status does not improve, will need supplemental nutrition via tube feeds or TPN.  - Appetite stimulant started 12/19.   - Caregivers to bring in outside food for patient.   - Continue calorie count.  - EGD Monday 12/24 as pt c/o poor appetite + burning in chest/esophagus and occasionally vomiting after eating x several weeks.   - EGD 12/24 unremarkable. prabhakar tube placed afterwards.   - TF started for nutrition 12/24/18. TF at goal rate, 50 ml/hr.  - Prealbumin 7 on 12/26. Repeat 9 on 1/3.  - Started scheduled remeron 12/31.   - dc remeron and start marinol 1/3.  - Cont to watch pt on marinol at this time.   - Pt with improvement in nausea after start of Carafate.   - Plan to hold off on EGD/PEG placement at this time as pt's appetite slowly improving.

## 2019-01-07 NOTE — ASSESSMENT & PLAN NOTE
-2/2 deconditioning from liver trx     See hospital course for functional, cognitive/speech/language, and nutrition/swallow status.      Recommendations  -  Encourage mobility, OOB in chair at least 3 hours per day, and early ambulation as appropriate  -  PT/OT evaluate and treat  -  Pain management  -  Monitor for and prevent skin breakdown and pressure ulcers  · Early mobility, repositioning/weight shifting every 20-30 minutes when sitting, turn patient every 2 hours, proper mattress/overlay and chair cushioning, pressure relief/heel protector boots  -  DVT prophylaxis:  [facility]  -  Reviewed discharge options (IP rehab, SNF, HH therapy, and OP therapy)

## 2019-01-07 NOTE — ASSESSMENT & PLAN NOTE
- Previously on Dialysis MWF. On Hold for now as kidneys recovering.  - Last HD 12/26/18.  - Good UOP and Cr level now WNL.   - Nephrology following, appreciate recs.  - Monitor.

## 2019-01-07 NOTE — PLAN OF CARE
Problem: Patient Care Overview  Goal: Plan of Care Review  Outcome: Ongoing (interventions implemented as appropriate)  VSS. Call light and personal items in reach. EGD was canceled today. Pt eating on his own. Will re-evaluate later. BS under control. Pt worked w/ pt today. No issues today. WCTM.

## 2019-01-07 NOTE — TREATMENT PLAN
GI Treatment Plan    Femi Enciso is a 53 y.o. male admitted to hospital 10/27/2018 (Hospital Day: 73) due to S/P liver transplant.     Plan  - discussed with primary team who would like to hold off on PEG tube placement at this time as patient is tolerating progression of diet. Will defer PEG placement at this time, please call if would like to re-evaluate for PEG  - Plan of care was discussed with primary team.  - We are signing-off. Please call with any questions.    Thank you for involving us in the care of Femi Enciso. Please call with any additional questions, concerns or changes in the patient's clinical status.    Percy Dhillon MD  Gastroenterology Fellow, PGY IV  Pager: [phone]

## 2019-01-07 NOTE — ASSESSMENT & PLAN NOTE
- Continue appetite stimulant.  - GI consulted as pt c/o burning sensation in chest/esophagus + vomiting after eating, passed SLP stephanie, recommended GI f/u.  - Cont to encourage PO intake. Ordered additional supplements.   - EGD 12/24, unremarkable. prabhakar tube placed and TF started. Increased TF to 45 ml, new goal rate 50 ml/hr.  - Prealbumin 7 on 12/26. Repeat prealbumin 9 on 1/3.  - started remeron 12/31.   - dc remeron and start marinol 1/3/19.   - Nausea now improving with the start of Carafate.   - Appetite also increasing and pt able to eat breakfast 1/7/19.   - Encourage PO intake and hold off on GI intervention at this time.

## 2019-01-08 VITALS
HEART RATE: 84 BPM | DIASTOLIC BLOOD PRESSURE: 80 MMHG | BODY MASS INDEX: 21.02 KG/M2 | OXYGEN SATURATION: 97 % | SYSTOLIC BLOOD PRESSURE: 135 MMHG | WEIGHT: 146.81 LBS | RESPIRATION RATE: 16 BRPM | TEMPERATURE: 98 F | HEIGHT: 70 IN

## 2019-01-08 DIAGNOSIS — Z94.4 LIVER REPLACED BY TRANSPLANT: Primary | ICD-10-CM

## 2019-01-08 PROBLEM — F41.9 ANXIETY: Status: RESOLVED | Noted: 2018-12-10 | Resolved: 2019-01-08

## 2019-01-08 PROBLEM — R06.00 DYSPNEA: Status: RESOLVED | Noted: 2019-01-02 | Resolved: 2019-01-08

## 2019-01-08 PROBLEM — R13.19 OTHER DYSPHAGIA: Status: RESOLVED | Noted: 2018-12-30 | Resolved: 2019-01-08

## 2019-01-08 PROBLEM — E87.6 HYPOKALEMIA: Status: RESOLVED | Noted: 2018-12-30 | Resolved: 2019-01-08

## 2019-01-08 LAB
ALBUMIN SERPL BCP-MCNC: 2.4 G/DL
ALP SERPL-CCNC: 158 U/L
ALT SERPL W/O P-5'-P-CCNC: 19 U/L
ANION GAP SERPL CALC-SCNC: 9 MMOL/L
AST SERPL-CCNC: 20 U/L
BASOPHILS # BLD AUTO: 0.22 K/UL
BASOPHILS NFR BLD: 3.1 %
BILIRUB SERPL-MCNC: 0.8 MG/DL
BUN SERPL-MCNC: 45 MG/DL
CALCIUM SERPL-MCNC: 9 MG/DL
CHLORIDE SERPL-SCNC: 103 MMOL/L
CO2 SERPL-SCNC: 28 MMOL/L
CREAT SERPL-MCNC: 1.2 MG/DL
DIFFERENTIAL METHOD: ABNORMAL
EOSINOPHIL # BLD AUTO: 0.2 K/UL
EOSINOPHIL NFR BLD: 3 %
ERYTHROCYTE [DISTWIDTH] IN BLOOD BY AUTOMATED COUNT: 15.2 %
EST. GFR  (AFRICAN AMERICAN): >60 ML/MIN/1.73 M^2
EST. GFR  (NON AFRICAN AMERICAN): >60 ML/MIN/1.73 M^2
GLUCOSE SERPL-MCNC: 104 MG/DL
HCT VFR BLD AUTO: 25.7 %
HGB BLD-MCNC: 7.9 G/DL
IMM GRANULOCYTES # BLD AUTO: 0.09 K/UL
IMM GRANULOCYTES NFR BLD AUTO: 1.3 %
LYMPHOCYTES # BLD AUTO: 1.1 K/UL
LYMPHOCYTES NFR BLD: 16 %
MAGNESIUM SERPL-MCNC: 1.5 MG/DL
MCH RBC QN AUTO: 27.9 PG
MCHC RBC AUTO-ENTMCNC: 30.7 G/DL
MCV RBC AUTO: 91 FL
MONOCYTES # BLD AUTO: 0.4 K/UL
MONOCYTES NFR BLD: 5.2 %
NEUTROPHILS # BLD AUTO: 5.1 K/UL
NEUTROPHILS NFR BLD: 71.4 %
NRBC BLD-RTO: 0 /100 WBC
PHOSPHATE SERPL-MCNC: 5.2 MG/DL
PLATELET # BLD AUTO: 398 K/UL
PMV BLD AUTO: 10.9 FL
POCT GLUCOSE: 162 MG/DL (ref 70–110)
POTASSIUM SERPL-SCNC: 3.8 MMOL/L
PROT SERPL-MCNC: 6.6 G/DL
RBC # BLD AUTO: 2.83 M/UL
SODIUM SERPL-SCNC: 140 MMOL/L
TACROLIMUS BLD-MCNC: 7.7 NG/ML
WBC # BLD AUTO: 7.08 K/UL

## 2019-01-08 PROCEDURE — 80197 ASSAY OF TACROLIMUS: CPT

## 2019-01-08 PROCEDURE — 85025 COMPLETE CBC W/AUTO DIFF WBC: CPT

## 2019-01-08 PROCEDURE — 83735 ASSAY OF MAGNESIUM: CPT

## 2019-01-08 PROCEDURE — 63600175 PHARM REV CODE 636 W HCPCS: Performed by: PHYSICIAN ASSISTANT

## 2019-01-08 PROCEDURE — 63600175 PHARM REV CODE 636 W HCPCS: Performed by: NURSE PRACTITIONER

## 2019-01-08 PROCEDURE — 25000003 PHARM REV CODE 250: Performed by: NURSE PRACTITIONER

## 2019-01-08 PROCEDURE — 97530 THERAPEUTIC ACTIVITIES: CPT

## 2019-01-08 PROCEDURE — 97110 THERAPEUTIC EXERCISES: CPT

## 2019-01-08 PROCEDURE — 99232 PR SUBSEQUENT HOSPITAL CARE,LEVL II: ICD-10-PCS | Mod: ,,, | Performed by: NURSE PRACTITIONER

## 2019-01-08 PROCEDURE — 25000003 PHARM REV CODE 250: Performed by: STUDENT IN AN ORGANIZED HEALTH CARE EDUCATION/TRAINING PROGRAM

## 2019-01-08 PROCEDURE — 97116 GAIT TRAINING THERAPY: CPT

## 2019-01-08 PROCEDURE — 97535 SELF CARE MNGMENT TRAINING: CPT

## 2019-01-08 PROCEDURE — 25000003 PHARM REV CODE 250: Performed by: PHYSICIAN ASSISTANT

## 2019-01-08 PROCEDURE — 99232 SBSQ HOSP IP/OBS MODERATE 35: CPT | Mod: ,,, | Performed by: NURSE PRACTITIONER

## 2019-01-08 PROCEDURE — 80053 COMPREHEN METABOLIC PANEL: CPT

## 2019-01-08 PROCEDURE — 84100 ASSAY OF PHOSPHORUS: CPT

## 2019-01-08 PROCEDURE — C9113 INJ PANTOPRAZOLE SODIUM, VIA: HCPCS | Performed by: NURSE PRACTITIONER

## 2019-01-08 RX ORDER — NAPROXEN SODIUM 220 MG/1
81 TABLET, FILM COATED ORAL DAILY
Refills: 0 | COMMUNITY
Start: 2019-01-09 | End: 2019-01-23 | Stop reason: SDUPTHER

## 2019-01-08 RX ORDER — INSULIN ASPART 100 [IU]/ML
0-5 INJECTION, SOLUTION INTRAVENOUS; SUBCUTANEOUS
Refills: 0
Start: 2019-01-08 | End: 2019-02-15

## 2019-01-08 RX ORDER — METOPROLOL TARTRATE 25 MG/1
12.5 TABLET ORAL 2 TIMES DAILY
Status: DISCONTINUED | OUTPATIENT
Start: 2019-01-08 | End: 2019-01-08 | Stop reason: HOSPADM

## 2019-01-08 RX ORDER — HEPARIN SODIUM 5000 [USP'U]/ML
5000 INJECTION, SOLUTION INTRAVENOUS; SUBCUTANEOUS EVERY 8 HOURS
Start: 2019-01-08 | End: 2019-01-31 | Stop reason: HOSPADM

## 2019-01-08 RX ORDER — CYCLOBENZAPRINE HCL 5 MG
5 TABLET ORAL 3 TIMES DAILY PRN
Start: 2019-01-08 | End: 2019-02-15

## 2019-01-08 RX ORDER — SUCRALFATE 1 G/10ML
1 SUSPENSION ORAL EVERY 8 HOURS
Qty: 900 ML | Refills: 1 | Status: SHIPPED | OUTPATIENT
Start: 2019-01-08 | End: 2019-02-26 | Stop reason: ALTCHOICE

## 2019-01-08 RX ORDER — PANTOPRAZOLE SODIUM 40 MG/1
40 TABLET, DELAYED RELEASE ORAL 2 TIMES DAILY
Qty: 60 TABLET | Refills: 5 | Status: CANCELLED | OUTPATIENT
Start: 2019-01-08

## 2019-01-08 RX ORDER — TACROLIMUS 1 MG/1
2 CAPSULE ORAL EVERY 12 HOURS
Qty: 120 CAPSULE | Refills: 11 | Status: SHIPPED | OUTPATIENT
Start: 2019-01-08 | End: 2019-01-31 | Stop reason: SDUPTHER

## 2019-01-08 RX ORDER — TRAMADOL HYDROCHLORIDE 50 MG/1
50 TABLET ORAL EVERY 6 HOURS PRN
Start: 2019-01-08 | End: 2019-01-23 | Stop reason: SDUPTHER

## 2019-01-08 RX ORDER — FLUCONAZOLE 200 MG/1
200 TABLET ORAL DAILY
Status: DISCONTINUED | OUTPATIENT
Start: 2019-01-09 | End: 2019-01-08 | Stop reason: HOSPADM

## 2019-01-08 RX ORDER — DRONABINOL 2.5 MG/1
2.5 CAPSULE ORAL 2 TIMES DAILY
Qty: 60 CAPSULE | Refills: 0 | Status: SHIPPED | OUTPATIENT
Start: 2019-01-08 | End: 2019-01-23 | Stop reason: SDUPTHER

## 2019-01-08 RX ORDER — ACETAMINOPHEN 325 MG/1
650 TABLET ORAL EVERY 6 HOURS PRN
Refills: 0 | COMMUNITY
Start: 2019-01-08 | End: 2019-03-18

## 2019-01-08 RX ORDER — PANTOPRAZOLE SODIUM 40 MG/1
40 TABLET, DELAYED RELEASE ORAL 2 TIMES DAILY
Status: DISCONTINUED | OUTPATIENT
Start: 2019-01-08 | End: 2019-01-08 | Stop reason: HOSPADM

## 2019-01-08 RX ORDER — LIDOCAINE 50 MG/G
1 PATCH TOPICAL DAILY
Refills: 0
Start: 2019-01-08 | End: 2019-02-15

## 2019-01-08 RX ORDER — METOPROLOL TARTRATE 25 MG/1
12.5 TABLET, FILM COATED ORAL 2 TIMES DAILY
Qty: 30 TABLET | Refills: 5 | Status: CANCELLED | OUTPATIENT
Start: 2019-01-08

## 2019-01-08 RX ORDER — ESCITALOPRAM OXALATE 20 MG/1
20 TABLET ORAL DAILY
Qty: 30 TABLET | Refills: 4 | Status: CANCELLED | OUTPATIENT
Start: 2019-01-09

## 2019-01-08 RX ORDER — VALGANCICLOVIR 450 MG/1
450 TABLET, FILM COATED ORAL DAILY
Status: DISCONTINUED | OUTPATIENT
Start: 2019-01-09 | End: 2019-01-08 | Stop reason: HOSPADM

## 2019-01-08 RX ORDER — ERGOCALCIFEROL 1.25 MG/1
50000 CAPSULE ORAL
Qty: 4 CAPSULE | Refills: 4 | Status: SHIPPED | OUTPATIENT
Start: 2019-01-10 | End: 2019-03-19 | Stop reason: SDUPTHER

## 2019-01-08 RX ORDER — METOPROLOL TARTRATE 25 MG/1
12.5 TABLET, FILM COATED ORAL 2 TIMES DAILY
Qty: 30 TABLET | Refills: 5 | Status: SHIPPED | OUTPATIENT
Start: 2019-01-08 | End: 2019-03-19 | Stop reason: SDUPTHER

## 2019-01-08 RX ORDER — MYCOPHENOLATE MOFETIL 250 MG/1
500 CAPSULE ORAL 2 TIMES DAILY
Status: DISCONTINUED | OUTPATIENT
Start: 2019-01-08 | End: 2019-01-08 | Stop reason: HOSPADM

## 2019-01-08 RX ORDER — PANTOPRAZOLE SODIUM 40 MG/1
40 TABLET, DELAYED RELEASE ORAL 2 TIMES DAILY
Qty: 60 TABLET | Refills: 5 | Status: SHIPPED | OUTPATIENT
Start: 2019-01-08 | End: 2019-03-19 | Stop reason: SDUPTHER

## 2019-01-08 RX ORDER — GABAPENTIN 300 MG/1
300 CAPSULE ORAL DAILY
Qty: 30 CAPSULE | Refills: 5 | Status: SHIPPED | OUTPATIENT
Start: 2019-01-09 | End: 2019-01-24

## 2019-01-08 RX ORDER — ESCITALOPRAM OXALATE 10 MG/1
20 TABLET ORAL DAILY
Status: DISCONTINUED | OUTPATIENT
Start: 2019-01-09 | End: 2019-01-08 | Stop reason: HOSPADM

## 2019-01-08 RX ORDER — MYCOPHENOLATE MOFETIL 250 MG/1
500 CAPSULE ORAL 2 TIMES DAILY
Qty: 120 CAPSULE | Refills: 2 | Status: SHIPPED | OUTPATIENT
Start: 2019-01-08 | End: 2019-02-13

## 2019-01-08 RX ORDER — ESCITALOPRAM OXALATE 20 MG/1
20 TABLET ORAL DAILY
Qty: 30 TABLET | Refills: 5 | Status: SHIPPED | OUTPATIENT
Start: 2019-01-09 | End: 2019-03-19 | Stop reason: SDUPTHER

## 2019-01-08 RX ADMIN — SUCRALFATE 1 G: 1 SUSPENSION ORAL at 01:01

## 2019-01-08 RX ADMIN — Medication 500 MG: at 08:01

## 2019-01-08 RX ADMIN — FLUCONAZOLE 200 MG: 40 POWDER, FOR SUSPENSION ORAL at 08:01

## 2019-01-08 RX ADMIN — Medication 12.5 MG: at 08:01

## 2019-01-08 RX ADMIN — MORPHINE 450 MG: 10 SOLUTION ORAL at 08:01

## 2019-01-08 RX ADMIN — PANTOPRAZOLE SODIUM 40 MG: 40 INJECTION, POWDER, LYOPHILIZED, FOR SOLUTION INTRAVENOUS at 08:01

## 2019-01-08 RX ADMIN — TACROLIMUS 2 MG: 1 CAPSULE ORAL at 08:01

## 2019-01-08 RX ADMIN — SULFAMETHOXAZOLE AND TRIMETHOPRIM 1 TABLET: 400; 80 TABLET ORAL at 08:01

## 2019-01-08 RX ADMIN — HEPARIN SODIUM 5000 UNITS: 5000 INJECTION, SOLUTION INTRAVENOUS; SUBCUTANEOUS at 05:01

## 2019-01-08 RX ADMIN — CYCLOBENZAPRINE HYDROCHLORIDE 5 MG: 5 TABLET, FILM COATED ORAL at 02:01

## 2019-01-08 RX ADMIN — ASPIRIN 81 MG CHEWABLE TABLET 81 MG: 81 TABLET CHEWABLE at 08:01

## 2019-01-08 RX ADMIN — SUCRALFATE 1 G: 1 SUSPENSION ORAL at 05:01

## 2019-01-08 RX ADMIN — HEPARIN SODIUM 5000 UNITS: 5000 INJECTION, SOLUTION INTRAVENOUS; SUBCUTANEOUS at 01:01

## 2019-01-08 RX ADMIN — DRONABINOL 2.5 MG: 2.5 CAPSULE ORAL at 08:01

## 2019-01-08 RX ADMIN — GABAPENTIN 300 MG: 300 CAPSULE ORAL at 08:01

## 2019-01-08 RX ADMIN — ESCITALOPRAM OXALATE 20 MG: 5 SOLUTION ORAL at 08:01

## 2019-01-08 NOTE — PROGRESS NOTES
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]  No    Hypomagnesemia [E83.42]  Yes    Severe malnutrition [E43]  Yes     Assessment and Plan  Severe Protein-Calorie Malnutrition  Malnutrition in the context ofChronic Illness/Injury     Related to (etiology):  Poor oral intake in the setting of ESLD and complications s/p liver transplant 11/11     Signs and Symptoms (as evidenced by):  Energy Intake: <50%of estimated energy requirement for 3 months  Body Fat Depletion: severe overall subcutaneous fat loss and depletion of orbital area as well as protruding patellas and acromion process  Muscle Mass Depletion: severe  temporal, clavicle, calf and quadricep muscle wasting   Weight Loss: 27% x 3 months   Recommendations     Recommendation/Intervention: 1. Liberalize diet to Regular.  2. Encourage multiple small meals. 3. D/c renal supplement--pt will not drink it.  Goals: 1. Pt to receive % EEN and EPN during stay.        Nausea [R11.0]  No    Alcohol use disorder, severe, in early remission, dependence [F10.21]  Yes    Prolonged Q-T interval on ECG [R94.31]  No    Decreased appetite [R63.0]  Yes    At risk for opportunistic infections [Z91.89]  No    Long-term use of immunosuppressant medication [Z79.899]  Not Applicable    Acute renal failure with acute tubular necrosis superimposed on stage 3 chronic kidney disease [N17.0, N18.3]  Yes    Overweight (BMI 25.0-29.9) [E66.3]  Yes    Prophylactic immunotherapy [Z29.8]  Not Applicable    Adrenal cortical steroids causing adverse effect in therapeutic use [T38.0X5A]  No    Weakness [R53.1]  No     Anemia of chronic disease [D63.8]  Yes    Type 2 diabetes mellitus without complication [E11.9]  Yes    Pleural effusion associated with hepatic disorder [K76.9, J91.8]  Yes      Resolved Hospital Problems    Diagnosis Date Resolved POA    Dyspnea [R06.00] 01/08/2019 No    Hypokalemia [E87.6] 01/08/2019 No    Other dysphagia [R13.19] 01/08/2019 No    Jaundice [R17] 12/31/2018 Yes    Hypophosphatemia [E83.39] 12/31/2018 No    Chest pain [R07.9] 12/31/2018 No    DEL (acute kidney injury) [N17.9] 12/21/2018 Unknown    Moderate malnutrition [E44.0] 12/21/2018 Unknown    Acute kidney failure with lesion of tubular necrosis [N17.0] 01/08/2019 Yes    Anxiety [F41.9] 01/08/2019 No    Delirium [R41.0] 12/31/2018 No    Hyperkalemia [E87.5] 12/22/2018 No    Intra-abdominal abscess [K65.1] 01/01/2019 No    Sequelae of hyperalimentation [E68] 11/25/2018 No    Leukocytosis [D72.829] 12/31/2018 No     54 y/o M h/o DM2, ETOH dependence c/b hepatitis admitted 10/27 for acute liver failure (c/b PSE, HRS, hepatic hydrothorax) and transplant evaluation  s/p DBDLT 11/11/18( CMV D+/R-, steroid induction, MMF/tacro maintenance) c/b significant blood product transfusions.  Patient cleared the delirium and most recently went to OR x 2 for leaks now with wound vac in place - no fever but elevted WBC prompting ID consult.  Weaned off pressors today and stable oxygenation as per nursing - no new issues       Acute encephalopathy [G93.40] 12/07/2018 No    Acute hyperglycemia [R73.9] 11/18/2018 Unknown    End stage liver disease [K72.90] 11/12/2018 Yes    Acute maculopapular rash [R21] 11/26/2018 Yes    Chronic kidney disease-mineral and bone disorder [N18.9, E83.9, M89.9] 11/02/2018 Yes    Alcohol use disorder, severe, in early remission [F10.21] 11/26/2018 Yes    Decompensated hepatic cirrhosis [K72.90] 11/26/2018 Yes    Acute liver failure without hepatic coma [K72.00] 11/12/2018 Yes    Alcoholic hepatitis with  ascites [K70.11] 11/12/2018 Yes    Acute liver failure [K72.00] 11/26/2018 Yes    Jaundice [R17] 11/26/2018 Yes    Severe alcohol dependence [F10.20] 11/26/2018 Yes    Coagulopathy [D68.9] 11/26/2018 Yes    Thrombocytopenia [D69.6] 11/26/2018 Yes    Hyponatremia [E87.1] 11/26/2018 Yes    Hepatic encephalopathy [K72.90] 11/12/2018 Yes    Sepsis [A41.9] 10/29/2018 Yes    Metabolic acidosis [E87.2] 11/26/2018 Yes    Moderate protein malnutrition [E44.0] 11/26/2018 Yes     Allergies:   Review of patient's allergies indicates:   Allergen Reactions    Penicillins Nausea And Vomiting and Rash     Full body rash from cefepime as well       Code Status: FULL    Vitals: Routine       Diet: regular diet    Activity: Activity as tolerated    Nursing Precautions: Fall and Pressure ulcer prevention    Bed/Surface: Low Air Loss    Consults: PT to evaluate and treat- 5 times a week, OT to evaluate and treat- 5 times a week and Nutrition to evaluate and recommend diet    Oxygen: room air    Dialysis: Patient is not on dialysis. Last HD 12/26. RCW permcath remains in place.    Labs: First blood draw on 1/10/19. Then Monday/Thursday thereafter. CBC, CMP, Mag, Phos, Prograf level.   Pending Diagnostic Studies:     Procedure Component Value Units Date/Time    CBC auto differential [[id number]] Collected:  12/20/18 0500    Order Status:  Sent Lab Status:  No result     Specimen:  Blood     CBC auto differential [[id number]] Collected:  11/11/18 1908    Order Status:  Sent Lab Status:  In process Updated:  11/11/18 1909    Specimen:  Blood     Comprehensive metabolic panel [[id number]] Collected:  12/20/18 0500    Order Status:  Sent Lab Status:  No result     Specimen:  Blood     Freeze and Hold -BB HEP [[id number]] Collected:  11/11/18 1210    Order Status:  Sent Lab Status:  No result     Specimen:  Blood     Magnesium [[id number]] Collected:  12/20/18 0500    Order Status:  Sent Lab Status:  No result     Specimen:  Blood      Magnesium [[id number]] Collected:  11/24/18 2248    Order Status:  Sent Lab Status:  In process Updated:  11/24/18 2248    Specimen:  Blood     Magnesium [[id number]] Collected:  11/18/18 0525    Order Status:  Sent Lab Status:  In process Updated:  11/18/18 0525    Specimen:  Blood     Magnesium [[id number]] Collected:  11/18/18 0525    Order Status:  Sent Lab Status:  In process Updated:  11/18/18 0525    Specimen:  Blood     Magnesium [[id number]] Collected:  11/18/18 0525    Order Status:  Sent Lab Status:  In process Updated:  11/18/18 0525    Specimen:  Blood     Magnesium [[id number]] Collected:  11/18/18 0525    Order Status:  Sent Lab Status:  In process Updated:  11/18/18 0525    Specimen:  Blood     Magnesium [[id number]] Collected:  11/18/18 0525    Order Status:  Sent Lab Status:  In process Updated:  11/18/18 0525    Specimen:  Blood     Magnesium [[id number]] Collected:  11/18/18 0525    Order Status:  Sent Lab Status:  In process Updated:  11/18/18 0525    Specimen:  Blood     Magnesium [[id number]] Collected:  11/16/18 2142    Order Status:  Sent Lab Status:  In process Updated:  11/17/18 0021    Specimen:  Blood     Magnesium [[id number]] Collected:  11/15/18 2259    Order Status:  Sent Lab Status:  In process Updated:  11/15/18 2300    Specimen:  Blood     Magnesium [[id number]] Collected:  11/15/18 2259    Order Status:  Sent Lab Status:  In process Updated:  11/15/18 2300    Specimen:  Blood     Magnesium [[id number]] Collected:  10/31/18 1610    Order Status:  Sent Lab Status:  In process Updated:  10/31/18 1610    Specimen:  Blood     Phosphorus [[id number]] Collected:  12/20/18 0500    Order Status:  Sent Lab Status:  No result     Specimen:  Blood     Protime-INR [[id number]] Collected:  11/18/18 0524    Order Status:  Sent Lab Status:  In process Updated:  11/18/18 0525    Specimen:  Blood     Renal function panel [[id number]] Collected:  11/24/18 2248    Order Status:  Sent Lab Status:  In process  Updated:  11/24/18 2248    Specimen:  Blood     Renal function panel [[id number]] Collected:  11/24/18 2248    Order Status:  Sent Lab Status:  In process Updated:  11/24/18 2248    Specimen:  Blood     Renal function panel [[id number]] Collected:  11/18/18 0525    Order Status:  Sent Lab Status:  In process Updated:  11/18/18 0525    Specimen:  Blood     Renal function panel [[id number]] Collected:  11/18/18 0525    Order Status:  Sent Lab Status:  In process Updated:  11/18/18 0525    Specimen:  Blood     Renal function panel [[id number]] Collected:  11/18/18 0525    Order Status:  Sent Lab Status:  In process Updated:  11/18/18 0525    Specimen:  Blood     Renal function panel [[id number]] Collected:  11/18/18 0525    Order Status:  Sent Lab Status:  In process Updated:  11/18/18 0525    Specimen:  Blood     Renal function panel [[id number]] Collected:  11/18/18 0525    Order Status:  Sent Lab Status:  In process Updated:  11/18/18 0525    Specimen:  Blood     Renal function panel [[id number]] Collected:  11/18/18 0525    Order Status:  Sent Lab Status:  In process Updated:  11/18/18 0525    Specimen:  Blood     Renal function panel [[id number]] Collected:  11/16/18 2142    Order Status:  Sent Lab Status:  In process Updated:  11/17/18 0021    Specimen:  Blood     Renal function panel [[id number]] Collected:  11/16/18 1336    Order Status:  Sent Lab Status:  In process Updated:  11/16/18 1812    Specimen:  Blood     Renal function panel [[id number]] Collected:  11/15/18 2259    Order Status:  Sent Lab Status:  In process Updated:  11/15/18 2300    Specimen:  Blood     Renal function panel [[id number]] Collected:  11/15/18 2259    Order Status:  Sent Lab Status:  In process Updated:  11/15/18 2300    Specimen:  Blood     Renal function panel [[id number]] Collected:  10/31/18 1610    Order Status:  Sent Lab Status:  In process Updated:  10/31/18 1610    Specimen:  Blood     Tacrolimus level [[id number]] Collected:  12/20/18  0500    Order Status:  Sent Lab Status:  No result     Specimen:  Blood         Imaging: N/A    Miscellaneous Care:   N/A    IV Access: Peripheral     Medications: Discontinue all previous medication orders, if any. See new list below.  Current Discharge Medication List      START taking these medications    Details   acetaminophen (TYLENOL) 325 MG tablet Take 2 tablets (650 mg total) by mouth every 6 (six) hours as needed.  Refills: 0      aspirin 81 MG Chew Take 1 tablet (81 mg total) by mouth once daily.  Refills: 0      cyclobenzaprine (FLEXERIL) 5 MG tablet Take 1 tablet (5 mg total) by mouth 3 (three) times daily as needed for Muscle spasms.      dronabinol (MARINOL) 2.5 MG capsule Take 1 capsule (2.5 mg total) by mouth 2 (two) times daily.  Qty: 60 capsule, Refills: 0      epoetin sherlyn (PROCRIT) 10,000 unit/mL injection Inject 0.4 mLs (4,000 Units total) into the skin every Mon, Wed, Fri.      ergocalciferol (ERGOCALCIFEROL) 50,000 unit Cap Take 1 capsule (50,000 Units total) by mouth every 7 days.  Qty: 4 capsule, Refills: 4      escitalopram oxalate (LEXAPRO) 20 MG tablet Take 1 tablet (20 mg total) by mouth once daily.  Qty: 30 tablet, Refills: 5      gabapentin (NEURONTIN) 300 MG capsule Take 1 capsule (300 mg total) by mouth once daily.  Qty: 30 capsule, Refills: 5      heparin sodium,porcine (HEPARIN, PORCINE,) 5,000 unit/mL injection Inject 1 mL (5,000 Units total) into the skin every 8 (eight) hours.      insulin aspart U-100 (NOVOLOG) 100 unit/mL InPn pen Inject 0-5 Units into the skin before meals and at bedtime as needed (Hyperglycemia).  Refills: 0      lidocaine (LIDODERM) 5 % Place 1 patch onto the skin once daily. Remove & Discard patch within 12 hours or as directed by MD  Refills: 0      metoprolol tartrate (LOPRESSOR) 25 MG tablet Take 0.5 tablets (12.5 mg total) by mouth 2 (two) times daily.  Qty: 30 tablet, Refills: 5      mycophenolate (CELLCEPT) 250 mg Cap Take 2 capsules (500 mg total)  by mouth 2 (two) times daily.  Qty: 120 capsule, Refills: 2      pantoprazole (PROTONIX) 40 MG tablet Take 1 tablet (40 mg total) by mouth 2 (two) times daily.  Qty: 60 tablet, Refills: 5      predniSONE (DELTASONE) 10 MG tablet Take by mouth daily: 20mg 11/17-11/23, 15mg 11/24-11/30, 10mg 12/1-12/7, 5mg 12/8-12/14, 2.5mg 12/15-12/21, Stop 12/22  Qty: 40 tablet, Refills: 0      sucralfate (CARAFATE) 100 mg/mL suspension Take 10 mLs (1 g total) by mouth every 8 (eight) hours.  Qty: 900 mL, Refills: 1      sulfamethoxazole-trimethoprim 400-80mg (BACTRIM,SEPTRA) 400-80 mg per tablet Take 1 tablet by mouth once daily. STOP 5/10/19  Qty: 30 tablet, Refills: 5      tacrolimus (PROGRAF) 1 MG Cap Take 2 capsules (2 mg total) by mouth every 12 (twelve) hours.  Qty: 120 capsule, Refills: 11      traMADol (ULTRAM) 50 mg tablet Take 1 tablet (50 mg total) by mouth every 6 (six) hours as needed for Pain.      valGANciclovir (VALCYTE) 450 mg Tab Take 1 tablet (450 mg total) by mouth once daily. Stop 5/10/19  Qty: 30 tablet, Refills: 5         CONTINUE these medications which have NOT CHANGED    Details   multivitamin (THERAGRAN) per tablet Take 1 tablet by mouth once daily.      ondansetron (ZOFRAN) 4 MG tablet Take 4 mg by mouth daily as needed for Nausea.         STOP taking these medications       calcium carbonate/vitamin D3 (VITAMIN D-3 ORAL) Comments:   Reason for Stopping:         cyanocobalamin (VITAMIN B-12) 100 MCG tablet Comments:   Reason for Stopping:         folic acid (FOLVITE) 1 MG tablet Comments:   Reason for Stopping:         lactulose (CHRONULAC) 10 gram/15 mL solution Comments:   Reason for Stopping:         omeprazole (PRILOSEC) 40 MG capsule Comments:   Reason for Stopping:         rifAXIMin (XIFAXAN) 550 mg Tab Comments:   Reason for Stopping:             Follow up: Transplant clinic Tuesday 1/15/19.

## 2019-01-08 NOTE — PLAN OF CARE
Problem: Physical Therapy Goal  Goal: Physical Therapy Goal  Goals to be met by: 2018     Patient will increase functional independence with mobility by performin. Supine to sit with Modified [place] -not met  2. Sit to stand transfer with [place] -not met  3. Bed to chair transfer with independence using no AD -not met  4. Gait  x150 feet with Supervision using LRAD. -not met  5. Lower extremity exercise program x20 reps per handout, with independence -not met            Pt progressing towards goals. continue with PT POC.Goals remain appropriate.  Petar Headley PTA  2019

## 2019-01-08 NOTE — PROGRESS NOTES
DISCHARGE NOTE:    Femi Enciso is a 53 y.o. 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      Past Medical History:   Diagnosis Date    Liver cirrhosis, alcoholic 2018       Hospital Course: hospital course complicated by DEL (requiring CRRT), however last HD was on 18; confusion and infection.  Patient required ex-laps x 3 for bleeding.  Patient treated for biloma with cefepime/flagyl and dapto.  Patient also had VRE which was treated with Zyvox.  Patient was struggling with swallowing and tolerating a diet, which he had an NG tube (all liquid meds), which was pulled and patient started on marinol and carafate which has improved his appetite. Patient with u/s showing hepatic artery stenosis - however we will monitor with u/s. Patient to be discharged to rehab today on all oral pills.     Allergies:   Review of patient's allergies indicates:   Allergen Reactions    Penicillins Nausea And Vomiting and Rash     Full body rash from cefepime as well       [facility]: [facility]    Discharge Medications:   Femi Enciso   Home Medication Instructions BRAD:[id number]    Printed on:19 0831   Medication Information                      acetaminophen (TYLENOL) 325 MG tablet  Take 2 tablets (650 mg total) by mouth every 6 (six) hours as needed.             aspirin 81 MG Chew  Take 1 tablet (81 mg total) by mouth once daily.             cyclobenzaprine (FLEXERIL) 5 MG tablet  Take 1 tablet (5 mg total) by mouth 3 (three) times daily as needed for Muscle spasms.             dronabinol (MARINOL) 2.5 MG capsule  Take 1 capsule (2.5 mg total) by mouth 2 (two) times daily.             epoetin sherlyn (PROCRIT) 10,000 unit/mL injection  Inject 0.4 mLs (4,000 Units total) into the skin every Mon, Wed, Fri.             ergocalciferol (ERGOCALCIFEROL) 50,000 unit Cap  Take 1 capsule (50,000 Units total) by mouth every 7 days.             escitalopram  oxalate (LEXAPRO) 20 MG tablet  Take 1 tablet (20 mg total) by mouth once daily.             gabapentin (NEURONTIN) 300 MG capsule  Take 1 capsule (300 mg total) by mouth once daily.             heparin sodium,porcine (HEPARIN, PORCINE,) 5,000 unit/mL injection  Inject 1 mL (5,000 Units total) into the skin every 8 (eight) hours.             insulin aspart U-100 (NOVOLOG) 100 unit/mL InPn pen  Inject 0-5 Units into the skin before meals and at bedtime as needed (Hyperglycemia).             lidocaine (LIDODERM) 5 %  Place 1 patch onto the skin once daily. Remove & Discard patch within 12 hours or as directed by MD             metoprolol tartrate (LOPRESSOR) 25 MG tablet  Take 0.5 tablets (12.5 mg total) by mouth 2 (two) times daily.             multivitamin (THERAGRAN) per tablet  Take 1 tablet by mouth once daily.             mycophenolate (CELLCEPT) 250 mg Cap  Take 2 capsules (500 mg total) by mouth 2 (two) times daily.             ondansetron (ZOFRAN) 4 MG tablet  Take 4 mg by mouth daily as needed for Nausea.             pantoprazole (PROTONIX) 40 MG tablet  Take 1 tablet (40 mg total) by mouth 2 (two) times daily.             predniSONE (DELTASONE) 10 MG tablet  Take by mouth daily: 20mg 11/17-11/23, 15mg 11/24-11/30, 10mg 12/1-12/7, 5mg 12/8-12/14, 2.5mg 12/15-12/21, Stop 12/22             sucralfate (CARAFATE) 100 mg/mL suspension  Take 10 mLs (1 g total) by mouth every 8 (eight) hours.             sulfamethoxazole-trimethoprim 400-80mg (BACTRIM,SEPTRA) 400-80 mg per tablet  Take 1 tablet by mouth once daily. STOP 5/10/19             tacrolimus (PROGRAF) 1 MG Cap  Take 2 capsules (2 mg total) by mouth every 12 (twelve) hours.             traMADol (ULTRAM) 50 mg tablet  Take 1 tablet (50 mg total) by mouth every 6 (six) hours as needed for Pain.             valGANciclovir (VALCYTE) 450 mg Tab  Take 1 tablet (450 mg total) by mouth once daily. Stop 5/10/19                 Pharmacy  Interventions/Recommendations:  1) Transplant Immunosuppression: Prograf 2/2, Cellcept 500 mg PO BID    2) Opportunistic Infection prophylaxis: Bactrim until 5/10/19 and Valcyte until 5/10/19    3) Patient Counseling/Education:  Patient to be educated in clinic once discharged from rehab.  Demonstrated the use of the BP cuff, thermometer.    4) Follow-Up/Discharge Needs:  Patient will need an MTM appointment to receive blue card, medications and re-education once discharged from rehab.      5) Patient received prescriptions for:      E-rx's:  [facility]         Printed rx's:  n/a      Faxed / Phoned in rx's:  n/a  To n/a pharmacy per patient request.    6) Patient Assistance Information: n/a    7) The following medications have been placed on HOLD and should be restarted in the outpatient setting (when appropriate): n/a    Femi and his caregiver verbalized their understanding and had the opportunity to ask questions.

## 2019-01-08 NOTE — PLAN OF CARE
Problem: Patient Care Overview  Goal: Plan of Care Review  Outcome: Ongoing (interventions implemented as appropriate)  -AAOx4  -R perm cath in place for HD. HD currently on hold per nephrology. Last HD 12/26. Cr now WNL.   -R DANIE ML saline locked. Dressing CDI.   -BG checks ac/hs. Night time . No SSI needed per order.   -PRN Flexeril given for muscle spasms.   -No nausea overnight. EGD/PEG placement on hold d/t increasing appetite.   -Poss d/c to rehab tomorrow.   -Pt up with one person assist. Wears non slip socks when oob. Mother at bedside attentive to pt needs.   -Pt free from falls/injuries thus far this shift.   -Bed in lowest, locked position. Bed rails up x2. Call light and personal belongings within reach.   -Will continue to monitor.

## 2019-01-08 NOTE — PT/OT/SLP PROGRESS
Occupational Therapy   Treatment    Name: Femi Enciso  MRN: [id number]  Admitting Diagnosis:  S/P liver transplant  15 Days Post-Op    Recommendations:     Discharge Recommendations: rehabilitation facility  Discharge Equipment Recommendations:  (TBD at next level of care)  Barriers to discharge:  None    Subjective     Pain/Comfort:  · Pain Rating 1: 0/10  · Location - Side 1: Right  · Location - Orientation 1: generalized  · Location 1: flank  · Pain Addressed 1: Reposition, Distraction  · Pain Rating Post-Intervention 1: 2/10  · Location - Side 2: Right  · Location - Orientation 2: generalized  · Location 2: flank  · Pain Addressed 2: Reposition, Cessation of Activity, Nurse notified    Objective:     Communicated with: RN prior to session.  Patient found with all lines intact, call button in reach, RN notified and Mother present and telemetry upon OT entry to room.    General Precautions: Standard, fall   Orthopedic Precautions:N/A   Braces: N/A     Occupational Performance:    Bed Mobility:    · Patient completed Rolling/Turning to Left with  stand by assistance with HOB elevated  · Patient completed Rolling/Turning to Right with stand by assistance with HOB elevated  · Patient completed Scooting/Bridging with contact guard assistance to reach EOB to prepare for functional actvities  · Patient completed Supine to Sit with minimum assistance for trunk control to reach upright posture    Functional Mobility/Transfers:  · Patient completed Sit <> Stand Transfer x2 with contact guard assistance  with  rolling walker; Upon standing for 1st trial, pt c/o increased dizziness and required to return to sitting to recover with pursed lip breathing.   · Patient completed Bed <> Chair Transfer using Step Transfer technique with contact guard assistance with rolling walker  · Functional Mobility: Pt ambulated to bathroom to complete self care tasks with CGA and RW due to impaired endurance and balance. No LOB noted. No RBs  required.     Activities of Daily Living:  · Grooming: contact guard assistance to complete dental and facial hygiene in standing at sink with RW      [facility] 6 Click ADL: 19    Treatment & Education:  Educated pt on the following:  - OT POC  - Importance of sitting UIC to complete UE exercises on handout with light resistance theraband   - Self care safety/ independence  - Bed mobility safety  - Functional transfer/ mobility safety   - Pursed lip breathing   - Therapeutic Exercise: BUE: AROM: Sitting UIC: Light resistance theraband: Bicep curls 1x15, Chest pulls 1x15, Shoulder diagonals 1x15, External rotations 1x15: Exercises completed to increase strength and endurance to prepare for functional activities         Patient left up in chair with all lines intact, call button in reach, RN notified and mother present  Education:    Assessment:     Femi Enciso is a 53 y.o. male with a medical diagnosis of S/P liver transplant.  He presents with the following performance deficits affecting function are weakness, impaired endurance, impaired self care skills, impaired functional mobilty, impaired balance, gait instability, impaired cardiopulmonary response to activity. Pt tolerated OT session well with only a slight increase in pain after activity. Pt progressing well in therapy demonstrating improved endurance during UE exercises without an extended rest break between each set. Pt tolerated UE exercises with light resistant theraband well with no c/o increased pain during activity. Pt in high spirits and motivated to participate in therapy today. Pt continues to demonstrate good motivation and progress throughout his course in therapy. Pt will continue to benefit from skilled OT to build his strength and endurance to prepare him for rehab. Pt remains good rehab potential in order to maximize his functional independence.     Rehab Prognosis:  Good; patient would benefit from acute skilled OT services to address these  deficits and reach maximum level of function.       Plan:     Patient to be seen 4 x/week to address the above listed problems via self-care/home management, therapeutic exercises, neuromuscular re-education, therapeutic activities  · Plan of Care Expires: 01/20/19  · Plan of Care Reviewed with: patient, mother    This Plan of care has been discussed with the patient who was involved in its development and understands and is in agreement with the identified goals and treatment plan    GOALS:   Multidisciplinary Problems     Occupational Therapy Goals        Problem: Occupational Therapy Goal    Goal Priority Disciplines Outcome Interventions   Occupational Therapy Goal     OT, PT/OT Ongoing (interventions implemented as appropriate)    Description:  Goals to be met by: 1/16/19 ( Revised 1/7)    Patient will increase functional independence with ADLs by performing:    UE Dressing with Supervision.   LE Dressing with Minimal Assistance.  Grooming while standing at sink with Stand-by Assistance.- Progressing  Toileting from toilet with Moderate Assistance for hygiene and clothing management.   Toilet transfer to toilet with moderate assistance.  Upper extremity  theraband exercise program x  15 reps  with independence.- Progressing                           Multidisciplinary Problems (Resolved)        Problem: Occupational Therapy Goal    Goal Priority Disciplines Outcome Interventions   Occupational Therapy Goal   (Resolved)     OT, PT/OT Resolved for Upgrade                    Time Tracking:     OT Date of Treatment: 01/08/19  OT Start Time: 1012  OT Stop Time: 1040  OT Total Time (min): 28 min    Billable Minutes:Self Care/Home Management 10  Therapeutic Exercise 18    Elizabeth Peng OT  1/8/2019

## 2019-01-08 NOTE — SUBJECTIVE & OBJECTIVE
Interval History 1/8/2019:  Patient is seen for follow-up rehab evaluation and recommendations:  PO intake yesterday and today.     HPI, Past Medical, Family, and Social History remains the same as documented in the initial encounter.    Scheduled Medications:    aspirin  81 mg Oral Daily    dronabinol  2.5 mg Oral BID    epoetin sherlyn (PROCRIT) injection  50 Units/kg (Dosing Weight) Intravenous Every Mon, Wed, Fri    ergocalciferol  50,000 Units Oral Q7 Days    escitalopram oxalate  20 mg Oral Daily    fluconazole 40 mg/ml  200 mg Oral Daily    gabapentin  300 mg Oral Daily    heparin (porcine)  5,000 Units Subcutaneous Q8H    lidocaine  1 patch Transdermal Q24H    metoprolol  12.5 mg Per NG tube BID    mycophenolate mofetil  500 mg Oral BID    pantoprazole  40 mg Intravenous BID    sucralfate  1 g Oral Q8H    sulfamethoxazole-trimethoprim 400-80mg  1 tablet Oral Daily    tacrolimus  2 mg Oral BID    valganciclovir 50 mg/ml  450 mg Oral Daily       Diagnostic Results: Labs: Reviewed    PRN Medications: acetaminophen, albuterol-ipratropium, aluminum & magnesium hydroxide-simethicone, artificial tears, bacitracin, bisacodyl, cyclobenzaprine, dextrose 50%, dextrose 50%, glucagon (human recombinant), glucose, glucose, heparin (porcine), insulin aspart U-100, ondansetron, traMADol    Review of Systems   Constitutional: Positive for activity change, appetite change (improving) and fatigue (improving).   HENT: Negative for trouble swallowing and voice change.    Respiratory: Negative for cough and shortness of breath.    Cardiovascular: Negative for chest pain and palpitations.   Gastrointestinal: Negative for nausea and vomiting.   Musculoskeletal: Positive for gait problem. Negative for arthralgias.   Skin: Positive for wound (surgical). Negative for color change.   Neurological: Positive for dizziness and weakness.     Objective:     Vital Signs (Most Recent):  Temp: 98 °F (36.7 °C) (01/08/19  0900)  Pulse: 82 (01/08/19 0900)  Resp: 20 (01/08/19 0759)  BP: (!) 150/85 (01/08/19 0900)  SpO2: 100 % (01/08/19 0900)    Vital Signs (24h Range):  Temp:  [98 °F (36.7 °C)-98.6 °F (37 °C)] 98 °F (36.7 °C)  Pulse:  [79-93] 82  Resp:  [16-29] 20  SpO2:  [96 %-100 %] 100 %  BP: (150-159)/() 150/85     Physical Exam   Constitutional: He is oriented to person, place, and time. He appears well-developed.   Mother at beside  Appearing much more well nourished    HENT:   Head: Normocephalic and atraumatic.   Eyes: Right eye exhibits no discharge. Left eye exhibits no discharge.   Neck: Neck supple.   Cardiovascular: Intact distal pulses.   Pulmonary/Chest: Effort normal. No respiratory distress.   Abdominal: Soft. He exhibits no distension.   Musculoskeletal: He exhibits no edema or deformity.   Generalized deconditioning    Neurological: He is alert and oriented to person, place, and time. No sensory deficit.        Skin: Skin is warm and dry.   Psychiatric: He has a normal mood and affect. His behavior is normal.     NEUROLOGICAL EXAMINATION:     MENTAL STATUS   Oriented to person, place, and time.

## 2019-01-08 NOTE — ASSESSMENT & PLAN NOTE
-  Decreased PO intake  -  s/p EGD 12/24->antrum and duodenal bulb bx  -  prabhakar tube placed afterwards-->TF started for nutrition  -  Appears more energetic with TFs  - GI reconsulted  -  Started on Marinol 1/3  -  unable to tolerate food for gastric emptying study.   -  [place] tube removed-->assessing PO intake prior to possible PEG placement  -  PO intake over past 2 days

## 2019-01-08 NOTE — PLAN OF CARE
Problem: Occupational Therapy Goal  Goal: Occupational Therapy Goal  Goals to be met by: 1/16/19 ( Revised 1/7)    Patient will increase functional independence with ADLs by performing:    UE Dressing with Supervision.   LE Dressing with Minimal Assistance.  Grooming while standing at sink with Stand-by Assistance.- Progressing  Toileting from toilet with Moderate Assistance for hygiene and clothing management.   Toilet transfer to toilet with moderate assistance.  Upper extremity  theraband exercise program x  15 reps  with independence.- Progressing               Outcome: Ongoing (interventions implemented as appropriate)  Pt progressing well in therapy. OT POC remains appropriate for patient on this date. Pt will continue to benefit from skilled OT to build strength and endurance to maximize functional independence.      Comments: Elizabeth Peng OTR/L

## 2019-01-08 NOTE — PROGRESS NOTES
[facility]  Physical Medicine & Rehab  Progress Note    Patient Name: Femi Enciso  MRN: [id number]  Admission Date: 10/27/2018  Length of Stay: 73 days  Attending Physician: Antonio Coronado MD    Subjective:     Principal Problem:S/P liver transplant    Hospital Course:   11/21/2018: Evaluated by therapy.  Bed mobility MaxA.  Sit to stand ModA x 2 ppl.  Ambulated 1 step MaxA.  UBD/LBD/toileting MaxA. Grooming Sanjay.  12/19/2018: Participated with therapy.  Bed mobility Sanjay.  Sit to stand Min-ModA and transfers ModA x 2 ppl.  No gait 2/2 nausea and dizziness during steps for trx.  UBD Sanjay and feeding (I).   12/20/2018: Participated with OT.  Sit to stand ModA x 2 ppl. No gait 2/2 anxiety.  Grooming Mod (I).   12/21/2018: Participated with PT.  Bed mobility Sanjay.  Sit to stand and transfers modA.  Ambulated ~3 steps modA.   12/24/2018: Participated with therapy.  Bed mobility CGA.  Sit to stand Min-ModA.  Ambulated-limited steps Min-MaxA x 2.  UBD Sanjay.  12/26/18: No therapy 2/2 dialysis.  12/27/2018: Participated with therapy.  Bed mobility CGA.  Sit to stand and transfers Sanjay.  Ambulated 3 ft Sanjay.  Pt refused all oob activities and ADL training. Pt requested OT to teach pt how to perform BUE exercises w/ theraband.  12/31/2019: Participated with therapy.  Bed mobility SBA-Sanjay.  Sit to stand Sanjay x 2 ppl.  Ambulated 20 ft Sanjay & RW.  UBD Sanjay and LBD/grooming setupA.  01/03/2019: Participated with therapy.  Bed mobility CGA.  Sit to stand CGA & RW and transfers Sanjay & RW.  Ambulated 56 ft CGA & RW.  Grooming SBA.  01/06/2019: Participated with therapy.  Bed mobility SV-Sanjay.  Sit to stand CGA-Sanjay and transfers CGA & RW.  Ambulated 150 ft CGA-Sanjay.  UBD Sanjay and LBD CGA. [place] tube removed for possible PEG placement today.   01/07/2019: Participated with therapy.  Bed mobility SBA-Sanjay .  Sit to stand MaxA & RW and transfers Sanjay-ModA & RW.  Ambulated 48 ft Sanjay & RW with chair to follow.  UBD Sanjay  and grooming CGA.    Interval History 1/8/2019:  Patient is seen for follow-up rehab evaluation and recommendations:  PO intake yesterday and today.     HPI, Past Medical, Family, and Social History remains the same as documented in the initial encounter.    Scheduled Medications:    aspirin  81 mg Oral Daily    dronabinol  2.5 mg Oral BID    epoetin sherlyn (PROCRIT) injection  50 Units/kg (Dosing Weight) Intravenous Every Mon, Wed, Fri    ergocalciferol  50,000 Units Oral Q7 Days    escitalopram oxalate  20 mg Oral Daily    fluconazole 40 mg/ml  200 mg Oral Daily    gabapentin  300 mg Oral Daily    heparin (porcine)  5,000 Units Subcutaneous Q8H    lidocaine  1 patch Transdermal Q24H    metoprolol  12.5 mg Per NG tube BID    mycophenolate mofetil  500 mg Oral BID    pantoprazole  40 mg Intravenous BID    sucralfate  1 g Oral Q8H    sulfamethoxazole-trimethoprim 400-80mg  1 tablet Oral Daily    tacrolimus  2 mg Oral BID    valganciclovir 50 mg/ml  450 mg Oral Daily       Diagnostic Results: Labs: Reviewed    PRN Medications: acetaminophen, albuterol-ipratropium, aluminum & magnesium hydroxide-simethicone, artificial tears, bacitracin, bisacodyl, cyclobenzaprine, dextrose 50%, dextrose 50%, glucagon (human recombinant), glucose, glucose, heparin (porcine), insulin aspart U-100, ondansetron, traMADol    Review of Systems   Constitutional: Positive for activity change, appetite change (improving) and fatigue (improving).   HENT: Negative for trouble swallowing and voice change.    Respiratory: Negative for cough and shortness of breath.    Cardiovascular: Negative for chest pain and palpitations.   Gastrointestinal: Negative for nausea and vomiting.   Musculoskeletal: Positive for gait problem. Negative for arthralgias.   Skin: Positive for wound (surgical). Negative for color change.   Neurological: Positive for dizziness and weakness.     Objective:     Vital Signs (Most Recent):  Temp: 98 °F (36.7 °C)  (01/08/19 0900)  Pulse: 82 (01/08/19 0900)  Resp: 20 (01/08/19 0759)  BP: (!) 150/85 (01/08/19 0900)  SpO2: 100 % (01/08/19 0900)    Vital Signs (24h Range):  Temp:  [98 °F (36.7 °C)-98.6 °F (37 °C)] 98 °F (36.7 °C)  Pulse:  [79-93] 82  Resp:  [16-29] 20  SpO2:  [96 %-100 %] 100 %  BP: (150-159)/() 150/85     Physical Exam   Constitutional: He is oriented to person, place, and time. He appears well-developed.   Mother at beside  Appearing much more well nourished    HENT:   Head: Normocephalic and atraumatic.   Eyes: Right eye exhibits no discharge. Left eye exhibits no discharge.   Neck: Neck supple.   Cardiovascular: Intact distal pulses.   Pulmonary/Chest: Effort normal. No respiratory distress.   Abdominal: Soft. He exhibits no distension.   Musculoskeletal: He exhibits no edema or deformity.   Generalized deconditioning    Neurological: He is alert and oriented to person, place, and time. No sensory deficit.   Skin: Skin is warm and dry.   Psychiatric: He has a normal mood and affect. His behavior is normal.     Assessment/Plan:      * S/P liver transplant    -s/p liver trx on 11/11 for alcoholic cirrhosis     Other dysphagia    -see decreased appetite     Impaired functional mobility and endurance    -2/2 deconditioning from liver trx     See hospital course for functional, cognitive/speech/language, and nutrition/swallow status.      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  -repeat US on 1/11/19 per LTS     Severe malnutrition    -see decreased appetite     Nausea    -resolved     Decreased appetite    -  Decreased PO intake  -  s/p EGD 12/24->antrum and duodenal bulb bx  -  prabhakar tube placed afterwards-->TF started for nutrition  -  Appears more energetic with TFs  - GI reconsulted  -  Started on Marinol 1/3  -  unable to tolerate food for gastric emptying study.   -  Prabhakar tube removed-->assessing PO intake prior to possible PEG placement  -  PO intake over past 2 days        Acute renal failure with acute tubular necrosis superimposed on stage 3 chronic kidney disease    -Nephrology following   -RRT held at this time, last HD 12/26  -kidney function improving          Pleural effusion associated with hepatic disorder    -dyspnea on 1/1/19--> CXR showed moderated edema.   - Lasix 60 mg IV on 1/1 and 1/2/19 with improvement  -on RA     Recommend Inpatient Rehab.  IRF preference per patient/[facility].  Barriers to IRF admission:  Nutritional plan (PO intake yesterday and today).         Mela Scanlon NP  Department of Physical Medicine & Rehab   [facility]

## 2019-01-08 NOTE — ASSESSMENT & PLAN NOTE
-2/2 deconditioning from liver trx     See hospital course for functional, cognitive/speech/language, and nutrition/swallow status.      Recommendations  -  Encourage mobility, OOB in chair at least 3 hours per day, and early ambulation as appropriate  -  PT/OT evaluate and treat  -  Pain management  -  Monitor for and prevent skin breakdown and pressure ulcers  · Early mobility, repositioning/weight shifting every 20-30 minutes when sitting, turn patient every 2 hours, proper mattress/overlay and chair cushioning, pressure relief/heel protector boots  -  DVT prophylaxis:  [facility]  -  Reviewed discharge options (IP rehab, SNF, HH therapy, and OP therapy)

## 2019-01-08 NOTE — PROGRESS NOTES
"Discharge Note  SW met with pt and pt's mother Ambrosio Enciso to f/u to provide psychosocial support and discharge planning assistance in preparation of anticipated transfer to inpatient rehab facility ([facility]) today.  Pt found reclined in reclining chair at bedside.  Pt reports adequate coping stating hospital course "has been rough but I'm trying".  Pt reports continued participation in PT and reports concern of not being able to eat as much as he should to recover and gain strength.  SW provided reflective listening, validation, normalization, and supportive counseling.  Pt was also observed with some confusion as he stated to SW, "You're walking a tight rope here, aren't you?"  When SW asked what was pt referring to, pt replied "the diamonds".  Pt's mother Ambrosio reported pt has been exhibiting confusion today and reported same to [facility] liver transplant team during rounds this morning.    Ambrosio confirmed plan of having pt d/c from inpatient rehab facility to local lodging ([address]; [place] [address]).  Ambrosio states she is pt's only caregiver at this time as pt's wife Suzy Enciso had to return home in [place] to resume working and pt's daughter Ann is unavailable due to starting clinicals.  Ambrosio reports having a hired caregiver to assist (mainly with transportation) but caregiver has other clients/obligations in her work schedule.  Hired caregiver is [employer] Nino (ph [phone]).  Ambrosio confirmed her phone number is [phone].  Per request, [employer] provided Ambrosio with list of cab companies and non-emergency medical transportation companies to use after d/c from rehab when Gin is unavailable.  SW encouraged Ambrosio to discuss with Suzy and other family members establishing stable back-up caregiver options as no other consistent caregiver is available to pt at this time.  Ambrosio expressed understanding and stated plans of doing so.  Pt and Ambrosio deny having any additional " questions or concerns at this time.  SW remains available for further psychosocial support and discharge planning assistance and will f/u as needed.

## 2019-01-08 NOTE — DISCHARGE SUMMARY
[facility]  Liver Transplant  Discharge Summary      Patient Name: Femi Enciso  MRN: [id number]  Admission Date: 10/27/2018  Hospital Length of Stay: 73 days  Discharge Date and Time:  2019 3:17 PM  Attending Physician: Antonio Coronado MD   Discharging Provider: Pamela Shirley NP  Primary Care Provider: Primary Doctor No  Post-Operative Day: 58     ORGAN:   LIVER  Disease Etiology: Alcoholic Cirrhosis  Donor Type:    - Brain Death  CDC High Risk:   No  Donor CMV Status:   Donor CMV Status: Positive  Donor HBcAB:   Negative  Donor HCV Status:   Negative  Whole or Partial: Whole Liver  Biliary Anastomosis: End to End  Arterial Anatomy: Standard    HPI: Femi Enciso is a 53 yr old male with PMH of acute ETOH hepatitis and DEL/ATN evolved to ESRD requiring HD (not candidate for KTX). He is now s/p liver transplant (SM induction, DBD, CMV D+/R-). Transplant surgery noted severe collateralization, adrenal gland firmly adhered to liver. Bare area of adrenal gland required several stitches and packing to obtain fair hemostasis (EBL 15L). OR take back  for bleeding from R adrenal bed in AM (significant clot in retroperitoneum, posterior to R hepatic lobe, tract posterior to the R kidney containing significant clot, small bleeding area of retroperitoneal fat) then returned to surgery same day for hemorrhaging closed with wound vac with plans to return to OR 24-48 hours for closure (retroperitoneal /retrorenal/retrocolic hematoma on the right ). Raw retroperitoneal bleeding. Suture ligatures placed, argon beam cautery, evarest placed in retroperitoneum behind cava and right kidney. Significant oozing from upper right adrenal gland, not amenable to ligation, that portion of the adrenal gland was resected with cautery). OR take back  for washout and incisional closure, no significant bleeding or hematoma found. OR cultures   from body fluid grew enterococcus faecium VRE. ID was  consulted, 11/21 started on daptomycin for VRE treatment and cefepime/flagyl to cover for IA ty in bile. Patient with significant leukocytosis with peak on 11/22 at 48K prompting drainage of R subphrenic fluid collection, perc drain placed, culture grew VRE. Stroke code called 11/21 for lethargy and unresponsiveness, CT head without evidence of acute ischemic changes and CTA chest negative, vascular neurology was consulted recommended MRI brain, but patient's AMS improved shortly after event. Nephrology consulted for ESRD requiring HD. Patient overall improved on antibiotic regimen, leukocytosis and AMS improved. He was transferred to [facility] on POD #15.     Post op course has been significant for leukocytosis, intraabdominal infection, pleural effusion, delirium, ARF and dysphagia. Pt started on BS antibiotics on 11/21 for wbc count of 48K. A CTA chest/abdomen/pelvis showed small to moderate volume free intraperitoneal fluid. IR perc drain placed on 11/22. Culture grew enterococcus VRE. Was treated with Dapto, Cefepime and Flagyl before transitioning to Zosyn to complete a 10 day course (ended 12/10). Ct abdomen/pelvis 11/27 with interval improvement of a perihepatic fluid collection status post IR drainage. Drain placed 11/16 removed on 11/28. Given continued elevated wbc count and persistent moderate right pleural effusion, thoracentesis performed 11/30 with 1.5 L removed. Wbc count 3969, segs 93%. Pt completed course of Zyvox for VRE and Cefepime was added for empyema through 12/7. Mr. Enciso continued to have periods of confusion/delirium. All blood/urine cultures have been ngtd. Psychiatry consulted. CT head negative on 12/4.     Pt with ARF requiring HD post transplant. Nephrology following. Lasix trial 11/28 with poor response. Pt tolerated HD MWF via RCW permcath. Kidney function starting improving daily and UOP began increasing. Renal function now stable. Cr 1.2 today. Last HD 12/26.     Dysphagia,  intermittent nausea and poor appetite has been ongoing since transplant. Pt tried Remeron with little improvement noted. GI consulted and performed an EGD on 12/24 which was unremarkable (esopaghus normal, patchy mildly erythematous mucosa without bleeding found in the gastric antrum, nodular mucosa in the duodenal bulb, biopsies taken - see below for results). A Quentin tube was placed on 12/24 and pt began tube feedings. He tolerated goal rate of 50 ml/hr but continued to not be able to tolerate solids. GI was re-consulted 1/2. Pt attempted a gastric emptying study but was unable to tolerate. Marinol started 1/3, Carafate started 1/4. [place] tube pulled out inadvertently on 1/6. Patient appetite improving and he is tolerating po intake. Will hold off on repeat EGD/peg tube placement for now.    Liver enzymes stable. Liver US 12/17/18 showed interval increase in the extrahepatic portion of the main hepatic artery with peak systolic velocity of 515 cm/s (previous measurement of 213). Findings concerning for possible arterial high grade stenosis. Vascular surgery consulted and recommended repeating US in 10-14 days. Repeat US on 12/28/18 showed a persistently elevated peak systolic velocity of 429 cm/sec and findings suggestive of hepatic artery stenosis. US reviewed and staff surgeon who recommended repeating in 2 weeks. Next liver US due Friday 1/11/19. Pt is currently on ASA.     Mr. Enciso is stable for transfer to Rehab facility today. He has no complaints. Liver enzymes stable. Appetite slowly improving. He will have labs twice weekly while at Rehab on Monday/Thursday. He will RTC on Tuesday 1/15/19 for surgeon visit. Will schedule f/u liver US at this time as well. Pt/venkatesh verbalized understanding to all discharge instructions.    Of note, pt has RCW permcath in place (no longer requiring HD)  Midline catheter to be removed prior to transfer        EGD biopsy 12/24/18:  1. DUODENAL BULB NODULARITY  (BIOPSY):  Duodenal mucosa with reactive changes  Including extensive benign surface foveolar metaplasia  No evidence urine CMV immunostain negative  2. 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 [T86.49, I77.1]  No    Hypomagnesemia [E83.42] 12/16/2018 Yes    Severe malnutrition [E43] 12/14/2018 Yes    Nausea [R11.0] 12/11/2018 No    Alcohol use disorder, severe, in early remission, dependence [F10.21] 12/07/2018 Yes    Prolonged Q-T interval on ECG [R94.31]  No    Decreased appetite [R63.0]  Yes    At risk for opportunistic infections [Z91.89] 11/27/2018 No    Long-term use of immunosuppressant medication [Z79.899] 11/27/2018 Not Applicable    Acute renal failure with acute tubular necrosis superimposed on stage 3 chronic kidney disease [N17.0, N18.3] 11/27/2018 Yes    Overweight (BMI 25.0-29.9) [E66.3] 11/13/2018 Yes    Prophylactic immunotherapy [Z29.8] 11/11/2018 Not Applicable    Adrenal cortical steroids causing adverse effect in therapeutic use [T38.0X5A] 11/11/2018 No    Weakness [R53.1]  No    Anemia of chronic disease [D63.8] 10/27/2018 Yes    Type 2 diabetes mellitus without complication [E11.9] 10/27/2018 Yes    Pleural effusion associated with hepatic disorder [K76.9, J91.8] 10/27/2018 Yes      Problems Resolved During this Admission:    Diagnosis Date Noted Date Resolved POA    Dyspnea [R06.00] 01/02/2019 01/08/2019 No    Hypokalemia [E87.6] 12/30/2018 01/08/2019 No    Other dysphagia [R13.19] 12/30/2018 01/08/2019 No     Jaundice [R17] 12/24/2018 12/31/2018 Yes    Hypophosphatemia [E83.39] 12/21/2018 12/31/2018 No    Chest pain [R07.9] 12/21/2018 12/31/2018 No    DEL (acute kidney injury) [N17.9]  12/21/2018 Unknown    Moderate malnutrition [E44.0]  12/21/2018 Unknown    Acute kidney failure with lesion of tubular necrosis [N17.0]  01/08/2019 Yes    Anxiety [F41.9] 12/10/2018 01/08/2019 No    Delirium [R41.0] 12/07/2018 12/31/2018 No    Hyperkalemia [E87.5]  12/22/2018 No    Intra-abdominal abscess [K65.1] 11/27/2018 01/01/2019 No    Sequelae of hyperalimentation [E68] 11/20/2018 11/25/2018 No    Leukocytosis [D72.829] 11/17/2018 12/31/2018 No    Acute encephalopathy [G93.40] 11/14/2018 12/07/2018 No    Acute hyperglycemia [R73.9] 11/11/2018 11/18/2018 Unknown    End stage liver disease [K72.90] 11/09/2018 11/12/2018 Yes    Acute maculopapular rash [R21] 11/06/2018 11/26/2018 Yes    Chronic kidney disease-mineral and bone disorder [N18.9, E83.9, M89.9] 11/02/2018 11/02/2018 Yes    Alcohol use disorder, severe, in early remission [F10.21] 10/30/2018 11/26/2018 Yes    Decompensated hepatic cirrhosis [K72.90] 10/28/2018 11/26/2018 Yes    Acute liver failure without hepatic coma [K72.00] 10/27/2018 11/12/2018 Yes    Alcoholic hepatitis with ascites [K70.11] 10/27/2018 11/12/2018 Yes    Acute liver failure [K72.00] 10/27/2018 11/26/2018 Yes    Jaundice [R17] 10/27/2018 11/26/2018 Yes    Severe alcohol dependence [F10.20] 10/27/2018 11/26/2018 Yes    Coagulopathy [D68.9] 10/27/2018 11/26/2018 Yes    Thrombocytopenia [D69.6] 10/27/2018 11/26/2018 Yes    Hyponatremia [E87.1] 10/27/2018 11/26/2018 Yes    Hepatic encephalopathy [K72.90] 10/27/2018 11/12/2018 Yes    Sepsis [A41.9] 10/27/2018 10/29/2018 Yes    Metabolic acidosis [E87.2] 10/27/2018 11/26/2018 Yes    Moderate protein malnutrition [E44.0] 10/27/2018 11/26/2018 Yes       Consults (From admission, onward)        Status Ordering Provider     Inpatient  consult to Critical Care Medicine  Once     Provider:  (Not yet assigned)    Completed BLACKMAN, MARTINA     Inpatient consult to Endocrinology  Once     Provider:  (Not yet assigned)    Completed PAULY GROVES JR     Inpatient consult to Gastroenterology  Once     Provider:  (Not yet assigned)    Completed CHRISTIANO THOMPSON     Inpatient consult to Hepatology  Once     Provider:  (Not yet assigned)    Completed BLACKMAN, MARTINA     Inpatient consult to Infectious Diseases  Once     Provider:  (Not yet assigned)    Completed JOSE HANNAH     Inpatient consult to Infectious Diseases  Once     Provider:  (Not yet assigned)    Completed JIMMIE FARMER     Inpatient consult to Infectious Diseases  Once     Provider:  (Not yet assigned)    Completed BLACKMAN, DEEPAK     Inpatient consult to Infectious Diseases  Once     Provider:  (Not yet assigned)    Completed LYUDMILA EDWARD     Inpatient consult to Infectious Diseases  Once     Provider:  (Not yet assigned)    Completed INOCENCIO CHOE     Inpatient consult to Interventional Radiology  Once     Provider:  (Not yet assigned)    Completed BELINAD HICKMAN     Inpatient consult to Kidney/Pancreas Transplant Medicine  Once     Provider:  (Not yet assigned)    Completed BLACKMAN, MARTINA     Inpatient consult to Midline team  Once     Provider:  (Not yet assigned)    Completed INOCENCIO CHOE     Inpatient consult to Nephrology  Once     Provider:  (Not yet assigned)    Completed BLACKMAN, MARTINA     Inpatient consult to Nephrology  Once     Provider:  (Not yet assigned)    Completed BLACKMAN, DEEPAK     Inpatient consult to Nephrology  Once     Provider:  (Not yet assigned)    Completed BLACKMAN, DEEPAK     Inpatient consult to Physical Medicine Rehab  Once     Provider:  (Not yet assigned)    Completed ADRIANE ROQUE     Inpatient consult to Psychiatry  Once     Provider:  (Not yet assigned)    Completed JEREMIE ALEGRE     Inpatient consult to Psychiatry  Once      Provider:  (Not yet assigned)    Completed JOSE DAVID CONDE     Inpatient consult to Psychiatry  Once     Provider:  (Not yet assigned)    Completed JOSE DAVID CONDE     Inpatient consult to Psychiatry  Once     Provider:  (Not yet assigned)    Completed BELINDA HICKMAN     Inpatient consult to Psychiatry  Once     Provider:  (Not yet assigned)    Completed INOCENCIO CHOE     Inpatient consult to Pulmonology  Once     Provider:  (Not yet assigned)    Completed MARTINA BLACKMAN     Inpatient consult to Registered Dietitian/Nutritionist  Once     Provider:  (Not yet assigned)    Completed NIA CHAVEZ     Inpatient consult to Registered Dietitian/Nutritionist  Once     Provider:  (Not yet assigned)    Completed JEREMIE ALEGRE     Inpatient consult to Registered Dietitian/Nutritionist  Once     Provider:  (Not yet assigned)    Completed JOSE HANNAH     Inpatient consult to Registered Dietitian/Nutritionist  Once     Provider:  (Not yet assigned)    Completed JIMMIE FARMER     Inpatient consult to Registered Dietitian/Nutritionist  Once     Provider:  (Not yet assigned)    Completed PAULY GROVES JR     Inpatient consult to Registered Dietitian/Nutritionist  Once     Provider:  (Not yet assigned)    Completed CHRISTIANO THOMPSON     Inpatient consult to SN[facility]  Once     Provider:  (Not yet assigned)    Completed INOCENCIO CHOE     Inpatient consult to [facility]  Once     Provider:  (Not yet assigned)    Completed ADRIANE ROQUE     Inpatient consult to [facility]  Once     Provider:  (Not yet assigned)    Completed ADRIANE ROQUE     Inpatient consult to Vascular Surgery  Once     Provider:  MARCO A Keen III, MD    Completed ADRIANE ROQUE          Pending Diagnostic Studies:     Procedure Component Value Units Date/Time    CBC auto differential [[id number]] Collected:  12/20/18 0500    Order Status:  Sent Lab Status:  No result     Specimen:  Blood     CBC auto differential  [[id number]] Collected:  11/11/18 1908    Order Status:  Sent Lab Status:  In process Updated:  11/11/18 1909    Specimen:  Blood     Comprehensive metabolic panel [[id number]] Collected:  12/20/18 0500    Order Status:  Sent Lab Status:  No result     Specimen:  Blood     Freeze and Hold -BB HEP [[id number]] Collected:  11/11/18 1210    Order Status:  Sent Lab Status:  No result     Specimen:  Blood     Magnesium [[id number]] Collected:  12/20/18 0500    Order Status:  Sent Lab Status:  No result     Specimen:  Blood     Magnesium [[id number]] Collected:  11/24/18 2248    Order Status:  Sent Lab Status:  In process Updated:  11/24/18 2248    Specimen:  Blood     Magnesium [[id number]] Collected:  11/18/18 0525    Order Status:  Sent Lab Status:  In process Updated:  11/18/18 0525    Specimen:  Blood     Magnesium [[id number]] Collected:  11/18/18 0525    Order Status:  Sent Lab Status:  In process Updated:  11/18/18 0525    Specimen:  Blood     Magnesium [[id number]] Collected:  11/18/18 0525    Order Status:  Sent Lab Status:  In process Updated:  11/18/18 0525    Specimen:  Blood     Magnesium [[id number]] Collected:  11/18/18 0525    Order Status:  Sent Lab Status:  In process Updated:  11/18/18 0525    Specimen:  Blood     Magnesium [[id number]] Collected:  11/18/18 0525    Order Status:  Sent Lab Status:  In process Updated:  11/18/18 0525    Specimen:  Blood     Magnesium [[id number]] Collected:  11/18/18 0525    Order Status:  Sent Lab Status:  In process Updated:  11/18/18 0525    Specimen:  Blood     Magnesium [[id number]] Collected:  11/16/18 2142    Order Status:  Sent Lab Status:  In process Updated:  11/17/18 0021    Specimen:  Blood     Magnesium [[id number]] Collected:  11/15/18 2259    Order Status:  Sent Lab Status:  In process Updated:  11/15/18 2300    Specimen:  Blood     Magnesium [[id number]] Collected:  11/15/18 2259    Order Status:  Sent Lab Status:  In process Updated:  11/15/18 2300    Specimen:  Blood      Magnesium [[id number]] Collected:  10/31/18 1610    Order Status:  Sent Lab Status:  In process Updated:  10/31/18 1610    Specimen:  Blood     Phosphorus [[id number]] Collected:  12/20/18 0500    Order Status:  Sent Lab Status:  No result     Specimen:  Blood     Protime-INR [[id number]] Collected:  11/18/18 0524    Order Status:  Sent Lab Status:  In process Updated:  11/18/18 0525    Specimen:  Blood     Renal function panel [[id number]] Collected:  11/24/18 2248    Order Status:  Sent Lab Status:  In process Updated:  11/24/18 2248    Specimen:  Blood     Renal function panel [[id number]] Collected:  11/24/18 2248    Order Status:  Sent Lab Status:  In process Updated:  11/24/18 2248    Specimen:  Blood     Renal function panel [[id number]] Collected:  11/18/18 0525    Order Status:  Sent Lab Status:  In process Updated:  11/18/18 0525    Specimen:  Blood     Renal function panel [[id number]] Collected:  11/18/18 0525    Order Status:  Sent Lab Status:  In process Updated:  11/18/18 0525    Specimen:  Blood     Renal function panel [[id number]] Collected:  11/18/18 0525    Order Status:  Sent Lab Status:  In process Updated:  11/18/18 0525    Specimen:  Blood     Renal function panel [[id number]] Collected:  11/18/18 0525    Order Status:  Sent Lab Status:  In process Updated:  11/18/18 0525    Specimen:  Blood     Renal function panel [[id number]] Collected:  11/18/18 0525    Order Status:  Sent Lab Status:  In process Updated:  11/18/18 0525    Specimen:  Blood     Renal function panel [[id number]] Collected:  11/18/18 0525    Order Status:  Sent Lab Status:  In process Updated:  11/18/18 0525    Specimen:  Blood     Renal function panel [[id number]] Collected:  11/16/18 2142    Order Status:  Sent Lab Status:  In process Updated:  11/17/18 0021    Specimen:  Blood     Renal function panel [[id number]] Collected:  11/16/18 1336    Order Status:  Sent Lab Status:  In process Updated:  11/16/18 1812    Specimen:  Blood      Renal function panel [[id number]] Collected:  11/15/18 2259    Order Status:  Sent Lab Status:  In process Updated:  11/15/18 2300    Specimen:  Blood     Renal function panel [[id number]] Collected:  11/15/18 2259    Order Status:  Sent Lab Status:  In process Updated:  11/15/18 2300    Specimen:  Blood     Renal function panel [[id number]] Collected:  10/31/18 1610    Order Status:  Sent Lab Status:  In process Updated:  10/31/18 1610    Specimen:  Blood     Tacrolimus level [[id number]] Collected:  12/20/18 0500    Order Status:  Sent Lab Status:  No result     Specimen:  Blood         Significant Diagnostic Studies: Labs:   CMP   Recent Labs   Lab 01/07/19  0646 01/08/19  0528    140   K 3.8 3.8    103   CO2 27 28   GLU 93 104   BUN 48* 45*   CREATININE 1.1 1.2   CALCIUM 9.2 9.0   PROT 6.4 6.6   ALBUMIN 2.3* 2.4*   BILITOT 0.9 0.8   ALKPHOS 162* 158*   AST 17 20   ALT 19 19   ANIONGAP 12 9   ESTGFRAFRICA >60.0 >60.0   EGFRNONAA >60.0 >60.0    and CBC   Recent Labs   Lab 01/07/19  0646 01/08/19  0528   WBC 8.33 7.08   HGB 8.5* 7.9*   HCT 27.2* 25.7*   * 398*       The patients clinical status was discussed at multidisplinary rounds, involving transplant surgery, transplant medicine, pharmacy, nursing, nutrition, and social work    Discharged Condition: good    Disposition: Rehab Facility    Follow Up: as above    Patient Instructions:      Diet Adult Regular     Call MD for:  temperature >100.4     Call MD for:  persistent nausea and vomiting or diarrhea     Call MD for:  severe uncontrolled pain     Call MD for:  redness, tenderness, or signs of infection (pain, swelling, redness, odor or green/yellow discharge around incision site)     Call MD for:  difficulty breathing or increased cough     Call MD for:  severe persistent headache     Call MD for:  worsening rash     Call MD for:  persistent dizziness, light-headedness, or visual disturbances     Call MD for:  increased confusion or  weakness     Call MD for:   Order Comments: Any unusual problems or concerns.     Activity as tolerated     Medications:  Reconciled Home Medications:      Medication List      START taking these medications    acetaminophen 325 MG tablet  Commonly known as:  TYLENOL  Take 2 tablets (650 mg total) by mouth every 6 (six) hours as needed.     aspirin 81 MG Chew  Take 1 tablet (81 mg total) by mouth once daily.  Start taking on:  1/9/2019     cyclobenzaprine 5 MG tablet  Commonly known as:  FLEXERIL  Take 1 tablet (5 mg total) by mouth 3 (three) times daily as needed for Muscle spasms.     dronabinol 2.5 MG capsule  Commonly known as:  MARINOL  Take 1 capsule (2.5 mg total) by mouth 2 (two) times daily.     epoetin sherlyn 10,000 unit/mL injection  Commonly known as:  PROCRIT  Inject 0.4 mLs (4,000 Units total) into the skin every Mon, Wed, Fri.  Start taking on:  1/9/2019     ergocalciferol 50,000 unit Cap  Commonly known as:  ERGOCALCIFEROL  Take 1 capsule (50,000 Units total) by mouth every 7 days.  Start taking on:  1/10/2019     escitalopram oxalate 20 MG tablet  Commonly known as:  LEXAPRO  Take 1 tablet (20 mg total) by mouth once daily.  Start taking on:  1/9/2019     gabapentin 300 MG capsule  Commonly known as:  NEURONTIN  Take 1 capsule (300 mg total) by mouth once daily.  Start taking on:  1/9/2019     heparin (porcine) 5,000 unit/mL injection  Inject 1 mL (5,000 Units total) into the skin every 8 (eight) hours.     insulin aspart U-100 100 unit/mL Inpn pen  Commonly known as:  NovoLOG  Inject 0-5 Units into the skin before meals and at bedtime as needed (Hyperglycemia).     lidocaine 5 %  Commonly known as:  LIDODERM  Place 1 patch onto the skin once daily. Remove & Discard patch within 12 hours or as directed by MD     metoprolol tartrate 25 MG tablet  Commonly known as:  LOPRESSOR  Take 0.5 tablets (12.5 mg total) by mouth 2 (two) times daily.     mycophenolate 250 mg Cap  Commonly known as:  CELLCEPT  Take  2 capsules (500 mg total) by mouth 2 (two) times daily.     pantoprazole 40 MG tablet  Commonly known as:  PROTONIX  Take 1 tablet (40 mg total) by mouth 2 (two) times daily.     predniSONE 10 MG tablet  Commonly known as:  DELTASONE  Take by mouth daily: 20mg 11/17-11/23, 15mg 11/24-11/30, 10mg 12/1-12/7, 5mg 12/8-12/14, 2.5mg 12/15-12/21, Stop 12/22     sucralfate 100 mg/mL suspension  Commonly known as:  CARAFATE  Take 10 mLs (1 g total) by mouth every 8 (eight) hours.     sulfamethoxazole-trimethoprim 400-80mg 400-80 mg per tablet  Commonly known as:  BACTRIM,SEPTRA  Take 1 tablet by mouth once daily. STOP 5/10/19     tacrolimus 1 MG Cap  Commonly known as:  PROGRAF  Take 2 capsules (2 mg total) by mouth every 12 (twelve) hours.     traMADol 50 mg tablet  Commonly known as:  ULTRAM  Take 1 tablet (50 mg total) by mouth every 6 (six) hours as needed for Pain.     valGANciclovir 450 mg Tab  Commonly known as:  VALCYTE  Take 1 tablet (450 mg total) by mouth once daily. Stop 5/10/19        CONTINUE taking these medications    multivitamin per tablet  Commonly known as:  THERAGRAN  Take 1 tablet by mouth once daily.     ondansetron 4 MG tablet  Commonly known as:  ZOFRAN  Take 4 mg by mouth daily as needed for Nausea.        STOP taking these medications    folic acid 1 MG tablet  Commonly known as:  FOLVITE     lactulose 10 gram/15 mL solution  Commonly known as:  CHRONULAC     omeprazole 40 MG capsule  Commonly known as:  PRILOSEC     rifAXIMin 550 mg Tab  Commonly known as:  XIFAXAN     VITAMIN B-12 100 MCG tablet  Generic drug:  cyanocobalamin     VITAMIN D-3 ORAL          Time spent caring for patient (Greater than 1/2 spent in direct face-to-face contact): > 30 minutes    Pamela Shirley NP  Liver Transplant  [facility]

## 2019-01-08 NOTE — PT/OT/SLP PROGRESS
Physical Therapy Treatment    Patient Name:  Femi Enciso   MRN:  [id number]    Recommendations:     Discharge Recommendations:  rehabilitation facility   Discharge Equipment Recommendations: (TBD)   Barriers to discharge: Inaccessible home and Decreased caregiver support    Assessment:     Femi Enciso is a 53 y.o. male admitted with a medical diagnosis of S/P liver transplant.  He presents with the following impairments/functional limitations:  weakness, impaired endurance, impaired self care skills, gait instability, impaired functional mobilty, impaired balance, impaired cardiopulmonary response to activity .Pt  motivated and cooperative with treatment session. Pt Progressing with PT Intervention. Pt Progressing with improving gait distance. Pt would continue to benefit from skilled PT to address overall functional mobility and goals. Pt  would benefit from IP rehab to continue to progress towards goals and improve quality of life. Goals remain appropriate.      Rehab Prognosis: Good; patient would benefit from acute skilled PT services to address these deficits and reach maximum level of function.    Recent Surgery: Procedure(s) (LRB):  EGD (ESOPHAGOGASTRODUODENOSCOPY) (N/A) 15 Days Post-Op    Plan:     During this hospitalization, patient to be seen 4 x/week to address the identified rehab impairments via gait training, therapeutic activities, therapeutic exercises, neuromuscular re-education and progress toward the following goals:    · Plan of Care Expires:  01/17/19    Subjective     Chief Complaint: fatigue  Patient/Family Comments/goals: Lets go for a walk  Pain/Comfort:  · Pain Rating 1: 0/10  · Location - Orientation 1: generalized  · Location 1: flank  · Pain Addressed 1: Reposition, Distraction  · Pain Rating Post-Intervention 1: (not rated)      Objective:     Communicated with RN prior to session.  Patient found supine telemetry  upon PT entry to room.     General Precautions: Standard, fall   Orthopedic  Precautions:N/A   Braces: N/A     Functional Mobility:  · Bed Mobility:  vcs for log rolling technique   · Rolling Right: stand by assistance  · Supine to Sit: minimum assistance  · Sit to Supine: contact guard assistance  · Transfers:     · Sit to Stand:  contact guard assistance and minimum assistance with rolling walker  · Gait: 75 ft x 2 with RW and CGA/min assistance with chair in tow for safety  vcs for safety and upright posture.    AM-PAC 6 CLICK MOBILITY  Turning over in bed (including adjusting bedclothes, sheets and blankets)?: 3  Sitting down on and standing up from a chair with arms (e.g., wheelchair, bedside commode, etc.): 3  Moving from lying on back to sitting on the side of the bed?: 3  Moving to and from a bed to a chair (including a wheelchair)?: 2  Need to walk in hospital room?: 3  Climbing 3-5 steps with a railing?: 1  Basic Mobility Total Score: 15       Therapeutic Activities and Exercises:   Discussed/educated patient on progress, safety, importance of OOB mobility for improving outcomes, d/c, PT POC,log rolling to decrease stress to abdominal region      Patient performed therex X 15 reps seated B LE AROM AP, LAQ, Hip Flexion, Hip Abd/Add   White board updated in patients room to current assistance level   Donned an extra gown   Pt encouraged to ambulate in hallways 3x/day with nursing or family assistance to improve endurance.   Pt safe to ambulate in hallway with RN or PCT assistance   Bedside table in front of patient and area set up for function, convenience, and safety. RN aware of patient's mobility needs and status. Questions/concerns addressed within PTA scope of practice; patient with no further questions.   Discussed with PT of record pt status ,progress  And changing  discharge recommendation to rehab.       Patient left supine with all lines intact, call button in reach and nsg notified..    GOALS:   Multidisciplinary Problems     Physical Therapy Goals        Problem: Physical  Therapy Goal    Goal Priority Disciplines Outcome Goal Variances Interventions   Physical Therapy Goal     PT, PT/OT Ongoing (interventions implemented as appropriate)     Description:  Goals to be met by: 2018     Patient will increase functional independence with mobility by performin. Supine to sit with Modified Hart -not met  2. Sit to stand transfer with Hart -not met  3. Bed to chair transfer with independence using no AD -not met  4. Gait  x150 feet with Supervision using LRAD. -not met  5. Lower extremity exercise program x20 reps per handout, with independence -not met                       Multidisciplinary Problems (Resolved)        Problem: Physical Therapy Goal    Goal Priority Disciplines Outcome Goal Variances Interventions   Physical Therapy Goal   (Resolved)     PT, PT/OT Resolved for Upgrade                     Time Tracking:     PT Received On: 19  PT Start Time: 0834     PT Stop Time: 0858  PT Total Time (min): 24 min     Billable Minutes: Gait Training 15 and Therapeutic Activity 9    Treatment Type: Treatment  PT/PTA: PTA     PTA Visit Number: 2     Petar Headley PTA  2019

## 2019-01-08 NOTE — PROGRESS NOTES
" [facility]  Adult Nutrition  Progress Note     SUMMARY       Recommendations  Recommendation/Intervention:   1. Calorie Count   2. Encourage increased PO intake and OS intake as tolerated.   3. Dietitian to monitor po intake before recommending possible supplemental TF.    Goals: Patient to meet >85% of EEN/EPN  Nutrition Goal Status: new  Communication of RD Recs: (POC)    Reason for Assessment  Reason For Assessment: RD follow-up  Diagnosis: transplant/postoperative complications(s/p OLTx 11/11)  Relevant Medical History: ESLD 2/2 alcoholic cirrhosis, ESRD on HD, DM2  Interdisciplinary Rounds: attended  General Information Comments: Swink Tube removed 1/6/19, PEG placement on hold d/t increasing appetite. 25% intake. No N/V. Continues appetite stimulant. NFPE completed 12/14/18. Patient with Severe Malnutrition in the context of Chronic Illness/Injury  Nutrition Discharge Planning: Adequate nutrition    Nutrition Risk Screen  Nutrition Risk Screen: tube feeding or parenteral nutrition    Nutrition/Diet History  Patient Reported Diet/Restrictions/Preferences: low salt, general  Food Preferences: noted  Spiritual, Cultural Beliefs, Denominational Practices, Values that Affect Care: no  Food Allergies: NKFA  Factors Affecting Nutritional Intake: decreased appetite    Anthropometrics  Temp: 98 °F (36.7 °C)  Height Method: Stated  Height: 5' 10" (177.8 cm)  Height (inches): 70 in  Weight Method: Bed Scale  Weight: 66.6 kg (146 lb 13.2 oz)  Weight (lb): 146.83 lb  Ideal Body Weight (IBW), Male: 166 lb  % Ideal Body Weight, Male (lb): 119.13 lb  BMI (Calculated): 28.4  BMI Grade: 25 - 29.9 - overweight     Lab/Procedures/Meds  Labs: Reviewed    01/08/2019    K 3.8 01/08/2019    BUN 45 (H) 01/08/2019    CREATININE 1.2 01/08/2019    CALCIUM 9.0 01/08/2019    PHOS 5.2 (H) 01/08/2019    MG 1.5 (L) 01/08/2019    ALBUMIN 2.4 (L) 01/08/2019      ALT 19 01/08/2019    AST 20 01/08/2019    ALKPHOS 158 (H) " 01/08/2019     POCT Glucose   Date Value Ref Range Status   01/07/2019 157 (H) 70 - 110 mg/dL Final   01/07/2019 134 (H) 70 - 110 mg/dL Final   01/07/2019 101 70 - 110 mg/dL Final   01/06/2019 141 (H) 70 - 110 mg/dL Final     Meds: Reviewed  Scheduled Meds:   aspirin  81 mg Oral Daily    dronabinol  2.5 mg Oral BID    epoetin sherlyn (PROCRIT) injection  50 Units/kg (Dosing Weight) Intravenous Every Mon, Wed, Fri    ergocalciferol  50,000 Units Oral Q7 Days    escitalopram oxalate  20 mg Oral Daily    fluconazole 40 mg/ml  200 mg Oral Daily    gabapentin  300 mg Oral Daily    heparin (porcine)  5,000 Units Subcutaneous Q8H    lidocaine  1 patch Transdermal Q24H    metoprolol  12.5 mg Per NG tube BID    mycophenolate mofetil  500 mg Oral BID    pantoprazole  40 mg Intravenous BID    sucralfate  1 g Oral Q8H    sulfamethoxazole-trimethoprim 400-80mg  1 tablet Oral Daily    tacrolimus  2 mg Oral BID    valganciclovir 50 mg/ml  450 mg Oral Daily     Estimated/Assessed Needs  Weight Used For Calorie Calculations: 66.5 kg (146 lb 9.7 oz)  Energy Calorie Requirements (kcal): 1970(PAL 1.3)  [facility]  Protein Requirements: 79-99(1.2-1.5 gm/kg)  Weight Used For Protein Calculations: 66.5 kg (146 lb 9.7 oz)  Fluid Requirements (mL): 1 mL/kcal or per MD  Estimated Fluid Requirement Method: RDA Method  RDA Method (mL): 1970     Nutrition Prescription Ordered  Current Diet Order: Regular  Oral Nutrition Supplement: Boost Plus, Boost Breeze    Evaluation of Received Nutrient/Fluid Intake in last 24h  I/O: +10.0L since 12/25/18   Comments: LBM 1/7/19  Tolerance: tolerating  % Intake of Estimated Energy Needs: 25 - 50 %  % Meal Intake: 25 - 50 %    Nutrition Risk  Level of Risk/Frequency of Follow-up: low(1x week)     Assessment and Plan  NFPE 12/14/18  Malnutrition in the context of Chronic Illness/Injury     Related to (etiology):  Cirrhosis s/p OLTx 11/11 and poor appetite      Signs and  Symptoms (as evidenced by):  Energy Intake: <75% of estimated energy requirement for 2 months  Body Fat Depletion: severe overall subcutaneous fat loss and depletion of orbital area, triceps as well as protruding patellas, ribs and acromion process  Muscle Mass Depletion: severe scapular, temporal, calf, quadricep muscle wasting   Weight Loss: 16.5% x 2 months      Nutrition Diagnosis Status:   Continues    Monitor and Evaluation  Food and Nutrient Intake: energy intake, food and beverage intake  Food and Nutrient Adminstration: diet order  Knowledge/Beliefs/Attitudes: food and nutrition knowledge/skill  Physical Activity and Function: factors affecting access to physical activity  Anthropometric Measurements: weight, weight change, body mass index  Biochemical Data, Medical Tests and Procedures: electrolyte and renal panel, gastrointestinal profile, glucose/endocrine profile, inflammatory profile, lipid profile  Nutrition-Focused Physical Findings: overall appearance, extremities, muscles and bones, head and eyes, skin     Nutrition Follow-Up  RD Follow-up?: Yes

## 2019-01-08 NOTE — PROGRESS NOTES
Met with patient and his mother in preparation for discharge to rehab.  Reviewed the review questions from [facility].  Patient and mother missed two questions.  Rationale for correct answer reviewed. These questions cover: post op care, medication management, infection prevention and emergency contact information.  Reviewed the plan for follow up US and surgeon clinic next week. Allowed time for questions and answers.

## 2019-01-08 NOTE — NURSING
"Attempted to call report to rehab. The gentleman who answered the phone stated "the  is not at the desk and I'm not sure who he is assigned to." This RN left her direct spectralink and name. Pt  time 1630. Rehab aware of  time.  "

## 2019-01-09 NOTE — PHYSICIAN QUERY
PT Name: Femi Enciso  MR #: [id number]    Physician Query Form - Nutrition Clarification     CDS Alison Dumont, RN, BSN        Contact Information:  [phone]    [email or web address]           This form is a permanent document in the medical record.     Query Date: January 9, 2019    By submitting this query, we are merely seeking further clarification of documentation.. Please utilize your independent clinical judgment when addressing the question(s) below.    The Medical record contains the following:   Indicators  Supporting Clinical Findings Location in Medical Record   X   % of Estimated Energy Intake over a time frame from p.o., TF, or TPN  <75% of estimated energy requirement for 2 months 10/31 Nutrition consult    X   Weight Status over a time frame pt reports poor appetite x2 months along with 30# wt loss  Moderate malnutrition noted; -- Weight Loss: 14% x 2 months   10/31 , 11/3 Nutrition consult    X   Subcutaneous Fat and/or Muscle Loss Body Fat Depletion: moderate depletion of orbitals and triceps   Muscle Mass Depletion: moderate depletion of temples, clavicle region and scapular region      10/31 Nutrition consult     Fluid Accumulation or Edema      Reduced  Strength     X   Wt / BMI / Usual Body Weight BMI: 26.2  WT: 82.6 KG  10/31 Nutrition consult     Delayed Wound Healing / Failure to Thrive     X   Acute or Chronic Illness Acute liver failure without hepatic coma   ESLD 2/2 alcoholic cirrhosis, ESRD on HD, DM2       H&P    Medication     X   Treatment Continue current Renal, High protein/calorie diet + Novasource ONS  10/31 Nutritional consult    X   Other Moderate protein malnutrition -- PAB, novasource, dietary  BMI: 25.11     NFPE completed on 10/31- exam remains unchanged. Pt with moderate malnutrition.  Moderate protein malnutrition    - temporal muscle wasting noted.  Decreased appetite and energy over past week worse.   Dietician consulted on admit.  Will consult post transplant as  needed.      Per chart, pt with 12% (21 lbs)wt loss x 6 weeks. Per pt's wife, pt has # and lost 56 lbs since September 2018. Pt was in dialysis on first pass, then was with PT doing therapy 2nd time. Spoke with pt but unable to complete full NFPE; however, it was very evident that pt has severe temporal, clavicle, calf and quadricep muscle wasting as well as severe overall subcutaneous fat loss including orbital and protruding acromion process and patellas.  Severe Protein-Calorie Malnutrition    Severe malnutrition    Dietician following. Severe temporal, clavicle muscle depletion noted. Severe subq fat loss  -Per dietician recs, will liberalize diet from low Na. Will have to do low potassium though for renal function  -Appreciate dietician assistance.     H&P 10/27, PN 10/28        11/7, 11/12, 11/15, 11/19, 11/30, 12/7      Nutrition consult     11/24 Transplant liver PN            12/14 Nutrition                             12/17 Transplant liver PN      AND / ASPEN Clinical Characteristics (October 2011)  A minimum of two characteristics is recommended for diagnosing either moderate or severe malnutrition   Mild Malnutrition Moderate Malnutrition Severe Malnutrition   Energy Intake from p.o., TF or TPN. < 75% intake of estimated energy needs for less than 7 days < 75% intake of estimated energy needs for greater than 7 days < 50% intake of estimated energy needs for > 5 days   Weight Loss 1-2% in 1 month  5% in 3 months  7.5% in 6 months  10% in 1 year 1-2 % in 1 week  5% in 1 month  7.5% in 3 months  10% in 6 months  20% in 1 year > 2% in 1 week  > 5% in 1 month  > 7.5% in 3 months  > 10% in 6 months  > 20% in 1 year   Physical Findings     None *Mild subcutaneous fat and/or muscle loss  *Mild fluid accumulation  *Stage II decubitus  *Surgical wound or non-healing wound *Mod/severe subcutaneous fat and/or muscle loss  *Mod/severe fluid accumulation  *Stage III or IV decubitus  *Non-healing surgical  wound     Provider, please specify diagnosis or diagnoses associated with above clinical findings.    [  ] Moderate Protein-Calorie Malnutrition   [ x ] Severe Protein-Calorie Malnutrition   [  ] Moderate Protein-Calorie Malnutrition on Admission  progressing to Severe Protein- Calorie Malnutrition    [  ] Other Nutritional Diagnosis (please specify):    [  ] Other:    [  ] Clinically Undetermined       Please document in your progress notes daily for the duration of treatment until resolved and include in your discharge summary.

## 2019-01-09 NOTE — PT/OT/SLP DISCHARGE
Physical Therapy Discharge Summary    Name: Feim Enciso  MRN: [id number]   Principal Problem: S/P liver transplant     Patient Discharged from acute Physical Therapy on 19.  Please refer to prior PT noted date on 19 for functional status.     Assessment:     Goals partially met. Patient appropriate for care in another setting.    Objective:     GOALS:   Multidisciplinary Problems     Physical Therapy Goals        Problem: Physical Therapy Goal    Goal Priority Disciplines Outcome Goal Variances Interventions   Physical Therapy Goal     PT, PT/OT Ongoing (interventions implemented as appropriate)     Description:  Goals to be met by: 2018     Patient will increase functional independence with mobility by performin. Supine to sit with Modified Dickens -not met  2. Sit to stand transfer with Dickens -not met  3. Bed to chair transfer with independence using no AD -not met  4. Gait  x150 feet with Supervision using LRAD. -not met  5. Lower extremity exercise program x20 reps per handout, with independence -not met                       Multidisciplinary Problems (Resolved)        Problem: Physical Therapy Goal    Goal Priority Disciplines Outcome Goal Variances Interventions   Physical Therapy Goal   (Resolved)     PT, PT/OT Resolved for Upgrade                     Reasons for Discontinuation of Therapy Services  Transfer to alternate level of care.      Plan:     Patient Discharged to: Inpatient Rehab.    La Nena Armstrong, PT  2019

## 2019-01-10 ENCOUNTER — TELEPHONE (OUTPATIENT)
Dept: TRANSPLANT | Facility: CLINIC | Age: 54
End: 2019-01-10

## 2019-01-10 LAB
EXT ALBUMIN: 2.5
EXT ALKALINE PHOSPHATASE: 153
EXT ALT: 26
EXT AST: 25
EXT BILIRUBIN TOTAL: 0.7
EXT BUN: 37
EXT CALCIUM: 9.2
EXT CHLORIDE: 104
EXT CO2: 23
EXT CREATININE: 1.3 MG/DL
EXT EOSINOPHIL%: 2.6
EXT GLUCOSE: 99
EXT HEMATOCRIT: 28.8
EXT HEMOGLOBIN: 8.6
EXT LYMPH%: 15.3
EXT MAGNESIUM: 1.3
EXT MONOCYTES%: 2.9
EXT PHOSPHORUS: 5.7
EXT PLATELETS: 373
EXT POTASSIUM: 4.6
EXT PROTEIN TOTAL: 6.8
EXT SEGS%: 74.5
EXT SODIUM: 139 MMOL/L
EXT TACROLIMUS LVL: 15.5
EXT WBC: 6.1

## 2019-01-10 NOTE — TELEPHONE ENCOUNTER
Reviewed labs with Dr. Lester , pt labs stable but prograf level elevated at 15.5. After reviewing MAR Fluconazole administered on 1/9 and then d/c'd. Dr. Lester states for pt to hold PM dose of prograf tonight and then restart tomorrow morning at 2 mg BID. Pt to have labs repeat labs Saturday 1/12. Informed charge nurse Gissel of inpatient rehab of changes, she will notify team.

## 2019-01-13 ENCOUNTER — TELEPHONE (OUTPATIENT)
Dept: TRANSPLANT | Facility: CLINIC | Age: 54
End: 2019-01-13

## 2019-01-13 NOTE — TELEPHONE ENCOUNTER
ON CALL NOTE    Labs drawn 1/12, including prograf level which resulted today, reviewed w/ Dr. Coronado.  No change in medication regimen recommended.  Gissel, the charge nurse at [facility], notified.  She agrees to notify the attending MD.

## 2019-01-14 ENCOUNTER — TELEPHONE (OUTPATIENT)
Dept: TRANSPLANT | Facility: CLINIC | Age: 54
End: 2019-01-14

## 2019-01-14 LAB
EXT ALBUMIN: 2.5
EXT ALKALINE PHOSPHATASE: 135
EXT ALT: 27
EXT AST: 27
EXT BILIRUBIN TOTAL: 0.7
EXT BUN: 29
EXT CALCIUM: 9.4
EXT CHLORIDE: 105
EXT CO2: 18
EXT CREATININE: 1.2 MG/DL
EXT EOSINOPHIL%: 3
EXT GLUCOSE: 63
EXT HEMATOCRIT: 26.1
EXT HEMOGLOBIN: 7.8
EXT LYMPH%: 16.8
EXT MAGNESIUM: 1.3
EXT MONOCYTES%: 3.8
EXT PHOSPHORUS: 5.2
EXT PLATELETS: 293
EXT POTASSIUM: 5.5
EXT PROTEIN TOTAL: 6.8
EXT SEGS%: 72.7
EXT SODIUM: 133 MMOL/L
EXT TACROLIMUS LVL: 9.2
EXT WBC: 4.7

## 2019-01-14 NOTE — TELEPHONE ENCOUNTER
Informed [facility] charge nurse labs reviewed are stable, no med changes needed. Will repeat Thurs 1/17. Pt to be seen in transplant clinic tomorrow.

## 2019-01-14 NOTE — TELEPHONE ENCOUNTER
----- Message from Bartolome Champion MD sent at 1/14/2019  2:18 PM CST -----  Results ok. No action needed   LM for pt to return our call

## 2019-01-15 ENCOUNTER — OFFICE VISIT (OUTPATIENT)
Dept: TRANSPLANT | Facility: CLINIC | Age: 54
End: 2019-01-15
Payer: COMMERCIAL

## 2019-01-15 ENCOUNTER — HOSPITAL ENCOUNTER (OUTPATIENT)
Dept: RADIOLOGY | Facility: HOSPITAL | Age: 54
Discharge: HOME | End: 2019-01-15
Attending: SURGERY
Payer: COMMERCIAL

## 2019-01-15 VITALS
OXYGEN SATURATION: 100 % | RESPIRATION RATE: 16 BRPM | DIASTOLIC BLOOD PRESSURE: 91 MMHG | TEMPERATURE: 98 F | HEART RATE: 79 BPM | SYSTOLIC BLOOD PRESSURE: 142 MMHG

## 2019-01-15 DIAGNOSIS — Z51.81 ENCOUNTER FOR THERAPEUTIC DRUG MONITORING: Primary | ICD-10-CM

## 2019-01-15 DIAGNOSIS — Z94.4 LIVER REPLACED BY TRANSPLANT: ICD-10-CM

## 2019-01-15 PROCEDURE — 76705 US LIVER TRANSPLANT POST: ICD-10-PCS | Mod: 26,59,, | Performed by: RADIOLOGY

## 2019-01-15 PROCEDURE — 99214 OFFICE O/P EST MOD 30 MIN: CPT | Mod: 24,S$GLB,, | Performed by: TRANSPLANT SURGERY

## 2019-01-15 PROCEDURE — 99999 PR PBB SHADOW E&M-EST. PATIENT-LVL III: ICD-10-PCS | Mod: PBBFAC,,,

## 2019-01-15 PROCEDURE — 99214 PR OFFICE/OUTPT VISIT, EST, LEVL IV, 30-39 MIN: ICD-10-PCS | Mod: 24,S$GLB,, | Performed by: TRANSPLANT SURGERY

## 2019-01-15 PROCEDURE — 76705 ECHO EXAM OF ABDOMEN: CPT | Mod: 26,59,, | Performed by: RADIOLOGY

## 2019-01-15 PROCEDURE — 99999 PR PBB SHADOW E&M-EST. PATIENT-LVL III: CPT | Mod: PBBFAC,,,

## 2019-01-15 PROCEDURE — 93975 US LIVER TRANSPLANT POST: ICD-10-PCS | Mod: 26,,, | Performed by: RADIOLOGY

## 2019-01-15 PROCEDURE — 93975 VASCULAR STUDY: CPT | Mod: 26,,, | Performed by: RADIOLOGY

## 2019-01-15 PROCEDURE — 93975 VASCULAR STUDY: CPT | Mod: TC

## 2019-01-15 NOTE — PROGRESS NOTES
Transplant Surgery  Liver Transplant Recipient Follow-up    Original Referring Physician: Richar Luis  Current Corresponding Physician: Ting Perez    Chief Complaint: Femi is here for follow up of his liver transplant performed 2018 for the primary diagnosis (UNOS) of Alcoholic Cirrhosis    ORGAN: LIVER  Whole or Partial: whole liver  Donor Type:  - brain death  PHS Increased Risk: no  Donor CMV Status: Positive  Donor HCV Status: Negative  Donor HBcAb: Negative  Donor HBV JAVIER: Negative  Donor HCV JAVIER: Negative    Biliary Anastomosis: end to end  Arterial Anatomy: standard  IVC reconstruction: end to end ivc  Portal vein status: patent    Subjective:     History of Present Illness: He has had the following complications since transplant: bleeding.  The noted complications are well controlled.    Interval History: Currently, he is doing adequately.  Current complaints include fatigue and poor appetite.  Femi is here for management of his immunosuppression medication.    Review of Systems   Constitutional: Negative for activity change and appetite change.   HENT: Negative for congestion and tinnitus.    Eyes: Negative for redness and visual disturbance.   Respiratory: Negative for cough and shortness of breath.    Cardiovascular: Negative for chest pain and palpitations.   Gastrointestinal: Positive for abdominal distention. Negative for abdominal pain.   Endocrine: Negative for polydipsia and polyuria.   Genitourinary: Negative for decreased urine volume and dysuria.   Musculoskeletal: Negative for arthralgias and back pain.   Skin: Negative for pallor and rash.   Allergic/Immunologic: Positive for immunocompromised state. Negative for environmental allergies.   Neurological: Negative for tremors and headaches.   Hematological: Negative for adenopathy. Does not bruise/bleed easily.   Psychiatric/Behavioral: Negative for behavioral problems and confusion.       Objective:      Physical Exam   Constitutional: He is oriented to person, place, and time. He appears well-developed and well-nourished. No distress.   HENT:   Head: Normocephalic.   Eyes: No scleral icterus.   Neck: Normal range of motion. Neck supple. No JVD present.   Cardiovascular: Normal rate, regular rhythm and intact distal pulses.   Pulmonary/Chest: Effort normal. No respiratory distress.   Abdominal: Soft. He exhibits no mass. There is no rebound and no guarding.   Musculoskeletal: Normal range of motion.   Neurological: He is alert and oriented to person, place, and time.   Skin: Skin is warm and dry. He is not diaphoretic.   Psychiatric: He has a normal mood and affect. His behavior is normal. Judgment and thought content normal.     Lab Results   Component Value Date    BILITOT 0.8 01/08/2019    AST 20 01/08/2019    ALT 19 01/08/2019    ALKPHOS 158 (H) 01/08/2019    CREATININE 1.2 01/08/2019    ALBUMIN 2.4 (L) 01/08/2019     Lab Results   Component Value Date    WBC 7.08 01/08/2019    HGB 7.9 (L) 01/08/2019    HCT 25.7 (L) 01/08/2019    HCT 24 (L) 11/16/2018     (H) 01/08/2019     Lab Results   Component Value Date    TACROLIMUS 7.7 01/08/2019       Assessment/Plan:          · S/P liver transplant.  · Chronic immunosuppressive medications for rejection prophylaxis at target.  Plan: no adjustment needed.  · Continue monitoring symptoms, labs and drug levels for drug-related toxicity and side effects.  · Incision: staples out, wound well-healed, no evidence of hernia  · Femoral arterial line site: no complications evident    Petar Beavers MD       [facility] Patient Status  Functional Status: 50% - Requires considerable assistance and frequent medical care  Physical Capacity: Limited Mobility

## 2019-01-16 ENCOUNTER — TELEPHONE (OUTPATIENT)
Dept: TRANSPLANT | Facility: CLINIC | Age: 54
End: 2019-01-16

## 2019-01-16 DIAGNOSIS — Z94.4 LIVER REPLACED BY TRANSPLANT: Primary | ICD-10-CM

## 2019-01-17 ENCOUNTER — TELEPHONE (OUTPATIENT)
Dept: TRANSPLANT | Facility: CLINIC | Age: 54
End: 2019-01-17

## 2019-01-17 LAB
EXT ALBUMIN: 2.6
EXT ALKALINE PHOSPHATASE: 128
EXT ALT: 23
EXT AST: 20
EXT BILIRUBIN TOTAL: 0.7
EXT BUN: 22
EXT CALCIUM: 9.3
EXT CHLORIDE: 101
EXT CO2: 24
EXT CREATININE: 1.2 MG/DL
EXT EOSINOPHIL%: 2.2
EXT GLUCOSE: 79
EXT HEMATOCRIT: 24.7
EXT HEMOGLOBIN: 7.8
EXT LYMPH%: 16.5
EXT MAGNESIUM: 1.1
EXT MONOCYTES%: 4.7
EXT PHOSPHORUS: 4.6
EXT PLATELETS: 247
EXT POTASSIUM: 5.6
EXT PROTEIN TOTAL: 6.9
EXT SEGS%: 73
EXT SODIUM: 133 MMOL/L
EXT TACROLIMUS LVL: 6.2
EXT WBC: 4.92

## 2019-01-17 NOTE — TELEPHONE ENCOUNTER
----- Message from Prakash Erwin sent at 1/17/2019  2:39 PM CST -----  Contact: Dr. Nolan   Returning Call from [facility]    Who Left Message: Unkn    Communication Preference: [phone]    Additional Information: Calling to speak with coordinator regarding the pt and labs

## 2019-01-17 NOTE — TELEPHONE ENCOUNTER
Spoke to Dr. Nolan and Danny Sparrow pharmacist re: Mg management.  Pt will receive Magnesium 2 mg IV tonight and 2 mg tomorrow; Magnesium oxide increased to 800 mg by mouth twice daily.  Labs will be repeated tomorrow.

## 2019-01-17 NOTE — TELEPHONE ENCOUNTER
----- Message from Amina Ma sent at 1/17/2019  2:22 PM CST -----  Contact: Dr. Nolan's Office  Needs Advice    Reason for call: Please call Dr. Nolan to discuss magnesium dosage.        Communication Preference: [phone]    Additional Information: n/a

## 2019-01-18 ENCOUNTER — TELEPHONE (OUTPATIENT)
Dept: TRANSPLANT | Facility: CLINIC | Age: 54
End: 2019-01-18

## 2019-01-18 NOTE — TELEPHONE ENCOUNTER
On 1-18-19, Social Work (SW) team received an email from Sukhjinder Wen, Inpatient Rehab, inquiring about possible transportation assistance for the pt's discharge from their facility tentative for 1-25-18.  This SW responded to the email on 1-19-19 advising the following information:     On the date of discharge, 1-8-19, our team member spoke with the patients wife who advised a paid caregiver had been hired to provide transportation for the pt and his  mother, info is as follows:   Gin Nino (ph [phone]).  Our team also provided the pt and his family with a list of cab companies and non-emergency medical transportation companies to use after d/c from your facility should Ms. Nino not be available.  Please advised if we can be of further assistance.     SW remains available for additional resources and/or education.

## 2019-01-20 LAB
ACID FAST MOD KINY STN SPEC: NORMAL
ACID FAST MOD KINY STN SPEC: NORMAL
MYCOBACTERIUM SPEC QL CULT: NORMAL
MYCOBACTERIUM SPEC QL CULT: NORMAL

## 2019-01-21 ENCOUNTER — TELEPHONE (OUTPATIENT)
Dept: TRANSPLANT | Facility: CLINIC | Age: 54
End: 2019-01-21

## 2019-01-21 LAB
EXT ALBUMIN: 2.6
EXT ALKALINE PHOSPHATASE: 122
EXT ALT: 18
EXT AST: 14
EXT BASOPHIL%: 2.4
EXT BILIRUBIN TOTAL: 0.6
EXT BUN: 29
EXT CALCIUM: 9.4
EXT CHLORIDE: 99
EXT CO2: 22
EXT CREATININE: 1.3 MG/DL
EXT EOSINOPHIL%: 1.3
EXT GLUCOSE: 137
EXT HEMATOCRIT: 23.5
EXT HEMOGLOBIN: 7.4
EXT LYMPH%: 15.9
EXT MAGNESIUM: 1.4
EXT MONOCYTES%: 3
EXT PHOSPHORUS: 5.1
EXT PLATELETS: 235
EXT POTASSIUM: 5.6
EXT PROTEIN TOTAL: 6.8
EXT SEGS%: 76.1
EXT SODIUM: 130 MMOL/L
EXT TACROLIMUS LVL: 6
EXT WBC: 4.64

## 2019-01-21 NOTE — TELEPHONE ENCOUNTER
Labs reviewed, no medication changes.  Spoke to Gissel, rehab charge nurse and message sent to Dr. Nolan.

## 2019-01-21 NOTE — TELEPHONE ENCOUNTER
----- Message from Bartolome Champion MD sent at 1/21/2019  2:50 PM CST -----  Results ok. No action needed

## 2019-01-23 DIAGNOSIS — Z94.4 STATUS POST LIVER TRANSPLANT: Primary | ICD-10-CM

## 2019-01-23 RX ORDER — LANOLIN ALCOHOL/MO/W.PET/CERES
800 CREAM (GRAM) TOPICAL 2 TIMES DAILY
Qty: 120 TABLET | Refills: 11 | Status: SHIPPED | OUTPATIENT
Start: 2019-01-23 | End: 2019-03-19 | Stop reason: SDUPTHER

## 2019-01-23 RX ORDER — NAPROXEN SODIUM 220 MG/1
81 TABLET, FILM COATED ORAL DAILY
Qty: 30 TABLET | Refills: 11 | Status: SHIPPED | OUTPATIENT
Start: 2019-01-23

## 2019-01-23 RX ORDER — TRAMADOL HYDROCHLORIDE 50 MG/1
50 TABLET ORAL EVERY 6 HOURS PRN
Qty: 28 TABLET | Refills: 0 | Status: SHIPPED | OUTPATIENT
Start: 2019-01-23 | End: 2019-02-26 | Stop reason: ALTCHOICE

## 2019-01-23 RX ORDER — DRONABINOL 2.5 MG/1
2.5 CAPSULE ORAL 2 TIMES DAILY
Qty: 60 CAPSULE | Refills: 0 | Status: SHIPPED | OUTPATIENT
Start: 2019-01-23 | End: 2019-02-26 | Stop reason: ALTCHOICE

## 2019-01-23 RX ORDER — ATOVAQUONE 750 MG/5ML
1500 SUSPENSION ORAL DAILY
Qty: 300 ML | Refills: 3 | Status: SHIPPED | OUTPATIENT
Start: 2018-11-11 | End: 2019-03-18 | Stop reason: SDUPTHER

## 2019-01-23 RX ORDER — VALGANCICLOVIR 450 MG/1
450 TABLET, FILM COATED ORAL DAILY
Qty: 30 TABLET | Refills: 3 | Status: SHIPPED | OUTPATIENT
Start: 2019-01-23 | End: 2019-02-13

## 2019-01-24 RX ORDER — GABAPENTIN 300 MG/1
300 CAPSULE ORAL 3 TIMES DAILY
Qty: 90 CAPSULE | Refills: 5 | Status: SHIPPED | OUTPATIENT
Start: 2019-01-24 | End: 2019-03-19 | Stop reason: SDUPTHER

## 2019-01-25 ENCOUNTER — TELEPHONE (OUTPATIENT)
Dept: TRANSPLANT | Facility: CLINIC | Age: 54
End: 2019-01-25

## 2019-01-25 LAB
EXT ALBUMIN: 2.7
EXT ALKALINE PHOSPHATASE: 146
EXT ALT: 20
EXT AST: 14
EXT BILIRUBIN TOTAL: 0.7
EXT BUN: 30
EXT CALCIUM: 9.7
EXT CHLORIDE: 98
EXT CO2: 25
EXT CREATININE: 1.4 MG/DL
EXT EOSINOPHIL%: 0.9
EXT GLUCOSE: 163
EXT HEMATOCRIT: 24.4
EXT HEMOGLOBIN: 7.8
EXT LYMPH%: 14.8
EXT MONOCYTES%: 3.3
EXT PLATELETS: 270
EXT POTASSIUM: 6.3
EXT PROTEIN TOTAL: 7.4
EXT SEGS%: 77.1
EXT SODIUM: 131 MMOL/L
EXT TACROLIMUS LVL: 3.9
EXT WBC: 4.54

## 2019-01-25 NOTE — TELEPHONE ENCOUNTER
----- Message from Bartolome Champion MD sent at 1/25/2019  2:06 PM CST -----  Results ok. No action needed

## 2019-01-25 NOTE — TELEPHONE ENCOUNTER
Informed Dr. Nolan at inpatient rehab labs reviewed with Dr. Champion. Dr. Champion states ok for pt to remain at rehab and Dr. Nolan address hyperkalemia. Per transplant pharmacist suggestion with switch to Veltassa, continue low K diet, and maintain better BG control. Tentative d/c date of Monday 1/28 and follow up in transplant clinic right after.

## 2019-01-28 ENCOUNTER — CLINICAL SUPPORT (OUTPATIENT)
Dept: TRANSPLANT | Facility: CLINIC | Age: 54
End: 2019-01-28
Payer: COMMERCIAL

## 2019-01-28 ENCOUNTER — OFFICE VISIT (OUTPATIENT)
Dept: TRANSPLANT | Facility: CLINIC | Age: 54
End: 2019-01-28
Payer: COMMERCIAL

## 2019-01-28 ENCOUNTER — HOSPITAL ENCOUNTER (OUTPATIENT)
Dept: RADIOLOGY | Facility: HOSPITAL | Age: 54
Discharge: HOME | End: 2019-01-28
Attending: SURGERY
Payer: COMMERCIAL

## 2019-01-28 VITALS
TEMPERATURE: 98 F | OXYGEN SATURATION: 100 % | HEART RATE: 96 BPM | HEART RATE: 96 BPM | DIASTOLIC BLOOD PRESSURE: 83 MMHG | WEIGHT: 141.31 LBS | TEMPERATURE: 98 F | OXYGEN SATURATION: 100 % | RESPIRATION RATE: 17 BRPM | HEIGHT: 70 IN | OXYGEN SATURATION: 100 % | BODY MASS INDEX: 20.23 KG/M2 | SYSTOLIC BLOOD PRESSURE: 124 MMHG | SYSTOLIC BLOOD PRESSURE: 124 MMHG | DIASTOLIC BLOOD PRESSURE: 83 MMHG | BODY MASS INDEX: 20.23 KG/M2 | RESPIRATION RATE: 17 BRPM | TEMPERATURE: 98 F | RESPIRATION RATE: 17 BRPM | HEIGHT: 70 IN | WEIGHT: 141.31 LBS | WEIGHT: 141.31 LBS | HEART RATE: 96 BPM | DIASTOLIC BLOOD PRESSURE: 83 MMHG | SYSTOLIC BLOOD PRESSURE: 124 MMHG | HEIGHT: 70 IN | BODY MASS INDEX: 20.23 KG/M2

## 2019-01-28 DIAGNOSIS — E87.5 HYPERKALEMIA: ICD-10-CM

## 2019-01-28 DIAGNOSIS — Z79.60 LONG-TERM USE OF IMMUNOSUPPRESSANT MEDICATION: ICD-10-CM

## 2019-01-28 DIAGNOSIS — Z74.09 IMPAIRED FUNCTIONAL MOBILITY AND ENDURANCE: ICD-10-CM

## 2019-01-28 DIAGNOSIS — Z94.9 HYPERTENSION ASSOCIATED WITH TRANSPLANTATION: Primary | ICD-10-CM

## 2019-01-28 DIAGNOSIS — N18.6 ESRD (END STAGE RENAL DISEASE) ON DIALYSIS: ICD-10-CM

## 2019-01-28 DIAGNOSIS — I15.8 HYPERTENSION ASSOCIATED WITH TRANSPLANTATION: Primary | ICD-10-CM

## 2019-01-28 DIAGNOSIS — Z99.2 ESRD (END STAGE RENAL DISEASE) ON DIALYSIS: ICD-10-CM

## 2019-01-28 DIAGNOSIS — R53.1 WEAKNESS: ICD-10-CM

## 2019-01-28 DIAGNOSIS — Z91.89 AT RISK FOR OPPORTUNISTIC INFECTIONS: ICD-10-CM

## 2019-01-28 DIAGNOSIS — Z94.4 LIVER REPLACED BY TRANSPLANT: ICD-10-CM

## 2019-01-28 DIAGNOSIS — Z48.23 ENCOUNTER FOR AFTERCARE FOLLOWING LIVER TRANSPLANT: Primary | ICD-10-CM

## 2019-01-28 DIAGNOSIS — E87.5 HYPERKALEMIA: Primary | ICD-10-CM

## 2019-01-28 DIAGNOSIS — Z29.89 PROPHYLACTIC IMMUNOTHERAPY: ICD-10-CM

## 2019-01-28 DIAGNOSIS — Z94.4 S/P LIVER TRANSPLANT: ICD-10-CM

## 2019-01-28 PROCEDURE — 3008F PR BODY MASS INDEX (BMI) DOCUMENTED: ICD-10-PCS | Mod: CPTII,S$GLB,, | Performed by: INTERNAL MEDICINE

## 2019-01-28 PROCEDURE — 99999 PR PBB SHADOW E&M-EST. PATIENT-LVL III: ICD-10-PCS | Mod: PBBFAC,,,

## 2019-01-28 PROCEDURE — 99999 PR PBB SHADOW E&M-EST. PATIENT-LVL III: ICD-10-PCS | Mod: PBBFAC,,, | Performed by: INTERNAL MEDICINE

## 2019-01-28 PROCEDURE — 99215 OFFICE O/P EST HI 40 MIN: CPT | Mod: S$GLB,,, | Performed by: INTERNAL MEDICINE

## 2019-01-28 PROCEDURE — 93975 US LIVER TRANSPLANT POST: ICD-10-PCS | Mod: 26,,, | Performed by: INTERNAL MEDICINE

## 2019-01-28 PROCEDURE — 99999 PR PBB SHADOW E&M-EST. PATIENT-LVL III: CPT | Mod: PBBFAC,,, | Performed by: INTERNAL MEDICINE

## 2019-01-28 PROCEDURE — 76705 ECHO EXAM OF ABDOMEN: CPT | Mod: 26,59,, | Performed by: INTERNAL MEDICINE

## 2019-01-28 PROCEDURE — 97802 MEDICAL NUTRITION INDIV IN: CPT | Mod: S$GLB,,, | Performed by: DIETITIAN, REGISTERED

## 2019-01-28 PROCEDURE — 93975 VASCULAR STUDY: CPT | Mod: TC

## 2019-01-28 PROCEDURE — 99999 PR PBB SHADOW E&M-EST. PATIENT-LVL III: CPT | Mod: PBBFAC,,,

## 2019-01-28 PROCEDURE — 93975 VASCULAR STUDY: CPT | Mod: 26,,, | Performed by: INTERNAL MEDICINE

## 2019-01-28 PROCEDURE — 97802 PR MED NUTR THER, 1ST, INDIV, EA 15 MIN: ICD-10-PCS | Mod: S$GLB,,, | Performed by: DIETITIAN, REGISTERED

## 2019-01-28 PROCEDURE — 99215 PR OFFICE/OUTPT VISIT, EST, LEVL V, 40-54 MIN: ICD-10-PCS | Mod: S$GLB,,, | Performed by: INTERNAL MEDICINE

## 2019-01-28 PROCEDURE — 76705 US LIVER TRANSPLANT POST: ICD-10-PCS | Mod: 26,59,, | Performed by: INTERNAL MEDICINE

## 2019-01-28 PROCEDURE — 3008F BODY MASS INDEX DOCD: CPT | Mod: CPTII,S$GLB,, | Performed by: INTERNAL MEDICINE

## 2019-01-28 RX ORDER — AMLODIPINE BESYLATE 5 MG/1
5 TABLET ORAL DAILY
Qty: 30 TABLET | Refills: 11 | Status: SHIPPED | OUTPATIENT
Start: 2019-01-28 | End: 2019-03-19 | Stop reason: SDUPTHER

## 2019-01-28 NOTE — PROGRESS NOTES
Met with pt and caregiver today in post L tx clinic.   Pt is s/p Ltx 11/11/2018  K: 5.3 today, was 6.3 1/25, normal levels between with some medication adjustments  Pt recently d/c'd from SNF/Rehab.   Potassium content of food list provided & reviewed w/ pt; encouraged to limit all high potassium foods and continue to monitor levels.  Pt is drinking a fruit-flavored (not milky) nutrition supplement, they could not recall the name at the time.  Advised he can continue drinking these, look for K content on label and factor into daily limit of 2,000mg potassium per day and avoid contreras/orange flavored options.    Pt and caregiver verbalized understanding.  RD contact info provided.

## 2019-01-28 NOTE — PROGRESS NOTES
Clinic Note: First Return to Clinic Post-  Liver Transplant    Femi Enciso  is a 53 y.o. male  S/p   LIVER transplant on 11/11/2018 (Liver) for Alcoholic Cirrhosis.      Discharge Course (Issues/Concerns): Patient presents in great health, with only issue of hyperkalemia. Patient given veltassa and repeat potassium improved.    Objective:   Vitals:    01/28/19 1459   BP: 124/83   Pulse: 96   Resp: 17   Temp: 97.8 °F (36.6 °C)       Met with patient and his caregiver in the clinic to review current medication list.     Current Outpatient Medications   Medication Sig Dispense Refill    acetaminophen (TYLENOL) 325 MG tablet Take 2 tablets (650 mg total) by mouth every 6 (six) hours as needed.  0    amLODIPine (NORVASC) 5 MG tablet Take 1 tablet (5 mg total) by mouth once daily. 30 tablet 11    aspirin 81 MG Chew Take 1 tablet (81 mg total) by mouth once daily. 30 tablet 11    atovaquone (MEPRON) 750 mg/5 mL Susp Take 10 mLs (1,500 mg total) by mouth once daily. STOP 5/10/19 300 mL 3    cyclobenzaprine (FLEXERIL) 5 MG tablet Take 1 tablet (5 mg total) by mouth 3 (three) times daily as needed for Muscle spasms.      dronabinol (MARINOL) 2.5 MG capsule Take 1 capsule (2.5 mg total) by mouth 2 (two) times daily. 60 capsule 0    ergocalciferol (ERGOCALCIFEROL) 50,000 unit Cap Take 1 capsule (50,000 Units total) by mouth every 7 days. 4 capsule 4    escitalopram oxalate (LEXAPRO) 20 MG tablet Take 1 tablet (20 mg total) by mouth once daily. 30 tablet 5    gabapentin (NEURONTIN) 300 MG capsule Take 1 capsule (300 mg total) by mouth 3 (three) times daily. 90 capsule 5    heparin sodium,porcine (HEPARIN, PORCINE,) 5,000 unit/mL injection Inject 1 mL (5,000 Units total) into the skin every 8 (eight) hours.      insulin aspart U-100 (NOVOLOG) 100 unit/mL InPn pen Inject 0-5 Units into the skin before meals and at bedtime as needed (Hyperglycemia).  0    lidocaine (LIDODERM) 5 % Place 1 patch onto the skin once daily.  Remove & Discard patch within 12 hours or as directed by MD  0    magnesium oxide (MAG-OX) 400 mg (241.3 mg magnesium) tablet Take 2 tablets (800 mg total) by mouth 2 (two) times daily. 120 tablet 11    metoprolol tartrate (LOPRESSOR) 25 MG tablet Take 0.5 tablets (12.5 mg total) by mouth 2 (two) times daily. 30 tablet 5    multivitamin (THERAGRAN) per tablet Take 1 tablet by mouth once daily.      mycophenolate (CELLCEPT) 250 mg Cap Take 2 capsules (500 mg total) by mouth 2 (two) times daily. 120 capsule 2    ondansetron (ZOFRAN) 4 MG tablet Take 4 mg by mouth daily as needed for Nausea.      pantoprazole (PROTONIX) 40 MG tablet Take 1 tablet (40 mg total) by mouth 2 (two) times daily. 60 tablet 5    patiromer calcium sorbitex (VELTASSA) 8.4 gram PwPk Take 1 packet (8.4 g total) by mouth once daily. 15 each 5    sucralfate (CARAFATE) 100 mg/mL suspension Take 10 mLs (1 g total) by mouth every 8 (eight) hours. 900 mL 1    tacrolimus (PROGRAF) 1 MG Cap Take 2 capsules (2 mg total) by mouth every 12 (twelve) hours. 120 capsule 11    traMADol (ULTRAM) 50 mg tablet Take 1 tablet (50 mg total) by mouth every 6 (six) hours as needed for Pain. 28 tablet 0    valGANciclovir (VALCYTE) 450 mg Tab Take 1 tablet (450 mg total) by mouth once daily. Stop 5/10/19 30 tablet 3     No current facility-administered medications for this visit.        Pharmacy Interventions/Recommendations:     1) Graft Function & Immunosuppression Issues:     2) Opportunistic Infection prophylaxis:   PCP ppx: Mepron until 5/10/19  CMV ppx: Valcyte until 5/10/19   Fungal ppx: None    3) Donor Serologies & Monitoring:     Donor CMV Status: Positive  Donor HCV Status: Negative  Donor HBcAb: Negative  Donor HBV JAVIER: Negative  Donor HCV JAVIER: Negative      4) Pain Management & Bowel Regimen: Tramadol    5) Blood Pressure Management: Metoprolol and amlodipine    6) Blood Sugar Management & Follow-up: None    7) Electrolyte Management: Mag ox    8)  OTHER medication follow-up (patient assistance, held medications, etc): None    9) Reinforced medication education conducted in the hospital, including medication indications, dosing, administration, side effects, monitoring-- including timing of immunosuppressant levels.     Patient received their FIRST fill of medications from [facility].  Discussed the process for obtaining refills of medications, including verifying the dose and mailing address to have medications delivered.     Femi and his caregivers verbalized understanding and had the opportunity to ask questions.

## 2019-01-28 NOTE — LETTER
February 3, 2019        Ting Perez  [address]  [facility] [address]  Phone: [phone]  Fax: [phone]             [facility] - Liver Transplant  [address]  [facility] [address]  Phone: [phone]   Patient: Femi Enciso   MR Number: [id number]   YOB: 1965   Date of Visit: 1/28/2019       Dear Dr. Ting Perez    Thank you for referring Femi Enciso to me for evaluation. Attached you will find relevant portions of my assessment and plan of care.    If you have questions, please do not hesitate to call me. I look forward to following Femi Enciso along with you.    Sincerely,    Heidi Sharma MD    Enclosure    If you would like to receive this communication electronically, please contact [email or web address] or [phone] to request [employer] Link access.    [employer] Link is a tool which provides read-only access to select patient information with whom you have a relationship. Its easy to use and provides real time access to review your patients record including encounter summaries, notes, results, and demographic information.    If you feel you have received this communication in error or would no longer like to receive these types of communications, please e-mail [email or web address]

## 2019-01-28 NOTE — PROGRESS NOTES
1ST NURSING VISIT POST DISCHARGE NOTE    1st RN appointment with Femi Enciso post discharge 1/28/19 from inpatient rehab s/p liver transplant 11/11/18.  Patient's mother accompanied him.  Upon assessment, patient complains of none.  Incision healed.  Patient that he is able to explain daily incision care and showering instructions.  Medication list and rationale were reviewed.  Patient states  did bring blue medication card and medication bottles for review.  Self-assessment reviewed with patient and mother; time allowed for questions.  Patient expressed understanding of daily care including BID VS and medications.  Patient aware that nurse will review today's labs with a transplant physician and call with any does changes indicated.

## 2019-01-28 NOTE — PROGRESS NOTES
TransplantHepatology  Liver Transplant Recipient Follow-up    Original Referring Physician: Richar Luis  Current Corresponding Physician: Ting Perez    Chief Complaint: Femi is here for follow up of his liver transplant performed 2018 for the primary diagnosis (UNOS) of Alcoholic Cirrhosis    ORGAN: LIVER  Whole or Partial: whole liver  Donor Type:  - brain death  PHS Increased Risk: no  Donor CMV Status: Positive  Donor HCV Status: Negative  Donor HBcAb: Negative  Donor HBV JAVIER: Negative  Donor HCV JAVIER: Negative    Biliary Anastomosis: end to end  Arterial Anatomy: standard  IVC reconstruction: end to end ivc  Portal vein status: patent      Old liver (explant):  Negative for malignancy  Advanced stage chronic liver disease  Fibrosis staging: Cirrhosis (stage 4 out of 4)  Etiology: Given clinical history of alcohol  Other findings:  Mild residual mixed steatosis  Cytoplasmic hyaline (Rebecca)  Severe cholestasis  Benign gallbladder with mild reactive changes    Immunosuppressions  Tacrolimus:                                          yes  MMF:                                                    yes  Prednisone:                                         No (induction completed)  Cyclosporine:                                       no  Sirolimus:                                             no  Everolimus:                                          no    Graft Function:                       excellent      Post-LT Complications  Acute cellular rejection:                       no  Antibody-mediated rejection:               no  Biliary anastomotic stricture:               no  HAT:                                                     no  HA stenosis:                                        no  PV stenosis:                                        no  CMV reactivation:                                 no  PTLD:                                                   no  Other malignancies:                              no      Liver Biopsy:    VCTE (fibroscan):    Post-LT Recommended Screening Tests  Dermatology check up:                           no  Colonoscopy:                                          yes  Bone Mineral Density (BMD):                 no  HbA1C:                                                    Yes  Lab Results   Component Value Date    HGBA1C 4.4 11/09/2018         Seasonal flu vaccination:    Subjective:     History of Present Illness: He has had the following complications since transplant: bleeding.  The noted complications are well controlled.    Interval History: Currently, he is doing adequately.  Current complaints include fatigue and poor appetite.  Femi is here for management of his immunosuppression medication.    Review of Systems   Constitutional: Negative for activity change and appetite change.   HENT: Negative for congestion and tinnitus.    Eyes: Negative for redness and visual disturbance.   Respiratory: Negative for cough and shortness of breath.    Cardiovascular: Negative for chest pain and palpitations.   Gastrointestinal: Positive for abdominal distention. Negative for abdominal pain.   Endocrine: Negative for polydipsia and polyuria.   Genitourinary: Negative for decreased urine volume and dysuria.   Musculoskeletal: Negative for arthralgias and back pain.   Skin: Negative for pallor and rash.   Allergic/Immunologic: Positive for immunocompromised state. Negative for environmental allergies.   Neurological: Negative for tremors and headaches.   Hematological: Negative for adenopathy. Does not bruise/bleed easily.   Psychiatric/Behavioral: Negative for behavioral problems and confusion.       Objective:     Physical Exam   Constitutional: He is oriented to person, place, and time. He appears well-developed and well-nourished. No distress.   HENT:   Head: Normocephalic.   Eyes: No scleral icterus.   Neck: Normal range of motion. Neck supple. No JVD present.   Cardiovascular:  Normal rate, regular rhythm and intact distal pulses.   Pulmonary/Chest: Effort normal. No respiratory distress.   Abdominal: Soft. He exhibits no mass. There is no rebound and no guarding.   Musculoskeletal: Normal range of motion.   Neurological: He is alert and oriented to person, place, and time.   Skin: Skin is warm and dry. He is not diaphoretic.   Psychiatric: He has a normal mood and affect. His behavior is normal. Judgment and thought content normal.     Lab Results   Component Value Date    BILITOT 0.8 01/08/2019    AST 20 01/08/2019    ALT 19 01/08/2019    ALKPHOS 158 (H) 01/08/2019    CREATININE 1.2 01/08/2019    ALBUMIN 2.4 (L) 01/08/2019     Lab Results   Component Value Date    WBC 7.08 01/08/2019    HGB 7.9 (L) 01/08/2019    HCT 25.7 (L) 01/08/2019    HCT 24 (L) 11/16/2018     (H) 01/08/2019     Lab Results   Component Value Date    TACROLIMUS 7.7 01/08/2019       Assessment/Plan:          · S/P liver transplant.  · Chronic immunosuppressive medications for rejection prophylaxis at target.  Plan: no adjustment needed.  · Continue monitoring symptoms, labs and drug levels for drug-related toxicity and side effects.  · Incision: staples out, wound well-healed, no evidence of hernia  · Femoral arterial line site: no complications evident    MD DARRION Rao Patient Status  Functional Status: 50% - Requires considerable assistance and frequent medical care  Physical Capacity: Limited Mobility

## 2019-01-29 ENCOUNTER — TELEPHONE (OUTPATIENT)
Dept: TRANSPLANT | Facility: CLINIC | Age: 54
End: 2019-01-29

## 2019-01-29 ENCOUNTER — TELEPHONE (OUTPATIENT)
Dept: PHARMACY | Facility: CLINIC | Age: 54
End: 2019-01-29

## 2019-01-29 NOTE — TELEPHONE ENCOUNTER
Spoke to pt and informed him lab orders faxed to [facility]  for Thursday 1/31. Also patient info given to Florence in nephrology access room to schedule pt for perm cath removal. Pt states she has already called and left message and he will call her back to schedule.

## 2019-01-29 NOTE — TELEPHONE ENCOUNTER
Justina initial delivery confirmed with Mr. Enciso (12 day supply). We will  Justina refill on  to Transplant clinic appointment. $0.00 copay- 004. Confirmed 2 patient identifiers - name and .     Patient declined full consultation due to receiving information at clinic appointment. All questions answered and addressed to patients satisfaction. Pt verbalized understanding.  Will f/u in 1-week.

## 2019-01-30 NOTE — TELEPHONE ENCOUNTER
Documentation Only:  Faxed prior authorization for Veltassa to insurance company for review on 1.30.2019 AKF 5:00pm

## 2019-01-31 ENCOUNTER — TELEPHONE (OUTPATIENT)
Dept: TRANSPLANT | Facility: CLINIC | Age: 54
End: 2019-01-31

## 2019-01-31 ENCOUNTER — HOSPITAL ENCOUNTER (EMERGENCY)
Facility: HOSPITAL | Age: 54
Discharge: HOME | End: 2019-01-31
Attending: EMERGENCY MEDICINE
Payer: COMMERCIAL

## 2019-01-31 ENCOUNTER — LAB VISIT (OUTPATIENT)
Dept: LAB | Facility: HOSPITAL | Age: 54
End: 2019-01-31
Attending: INTERNAL MEDICINE
Payer: COMMERCIAL

## 2019-01-31 VITALS
BODY MASS INDEX: 20.21 KG/M2 | DIASTOLIC BLOOD PRESSURE: 73 MMHG | WEIGHT: 141.13 LBS | RESPIRATION RATE: 18 BRPM | OXYGEN SATURATION: 100 % | HEIGHT: 70 IN | TEMPERATURE: 98 F | HEART RATE: 83 BPM | SYSTOLIC BLOOD PRESSURE: 119 MMHG

## 2019-01-31 DIAGNOSIS — T86.40: Primary | ICD-10-CM

## 2019-01-31 DIAGNOSIS — E87.5 SERUM POTASSIUM ELEVATED: ICD-10-CM

## 2019-01-31 DIAGNOSIS — E87.5 HYPERKALEMIA: Primary | ICD-10-CM

## 2019-01-31 DIAGNOSIS — Z94.4 LIVER REPLACED BY TRANSPLANT: Primary | ICD-10-CM

## 2019-01-31 DIAGNOSIS — Z94.4 HX OF LIVER TRANSPLANT: ICD-10-CM

## 2019-01-31 DIAGNOSIS — E83.42 HYPOMAGNESEMIA: ICD-10-CM

## 2019-01-31 LAB
ALBUMIN SERPL BCP-MCNC: 2.7 G/DL
ALBUMIN SERPL BCP-MCNC: 2.9 G/DL
ALP SERPL-CCNC: 130 U/L
ALP SERPL-CCNC: 131 U/L
ALT SERPL W/O P-5'-P-CCNC: 15 U/L
ALT SERPL W/O P-5'-P-CCNC: 16 U/L
ANION GAP SERPL CALC-SCNC: 8 MMOL/L
ANION GAP SERPL CALC-SCNC: 9 MMOL/L
ANISOCYTOSIS BLD QL SMEAR: SLIGHT
ANISOCYTOSIS BLD QL SMEAR: SLIGHT
AST SERPL-CCNC: 12 U/L
AST SERPL-CCNC: 13 U/L
BASOPHILS # BLD AUTO: ABNORMAL K/UL
BASOPHILS NFR BLD: 1 %
BASOPHILS NFR BLD: 3 %
BILIRUB SERPL-MCNC: 0.5 MG/DL
BILIRUB SERPL-MCNC: 0.7 MG/DL
BUN SERPL-MCNC: 33 MG/DL
BUN SERPL-MCNC: 35 MG/DL
CALCIUM SERPL-MCNC: 10.1 MG/DL
CALCIUM SERPL-MCNC: 10.1 MG/DL
CHLORIDE SERPL-SCNC: 103 MMOL/L
CHLORIDE SERPL-SCNC: 105 MMOL/L
CO2 SERPL-SCNC: 20 MMOL/L
CO2 SERPL-SCNC: 24 MMOL/L
CREAT SERPL-MCNC: 1.3 MG/DL
CREAT SERPL-MCNC: 1.4 MG/DL
DIFFERENTIAL METHOD: ABNORMAL
DIFFERENTIAL METHOD: ABNORMAL
EOSINOPHIL # BLD AUTO: ABNORMAL K/UL
EOSINOPHIL NFR BLD: 0 %
EOSINOPHIL NFR BLD: 2 %
ERYTHROCYTE [DISTWIDTH] IN BLOOD BY AUTOMATED COUNT: 16 %
ERYTHROCYTE [DISTWIDTH] IN BLOOD BY AUTOMATED COUNT: 16 %
EST. GFR  (AFRICAN AMERICAN): >60 ML/MIN/1.73 M^2
EST. GFR  (AFRICAN AMERICAN): >60 ML/MIN/1.73 M^2
EST. GFR  (NON AFRICAN AMERICAN): 57 ML/MIN/1.73 M^2
EST. GFR  (NON AFRICAN AMERICAN): >60 ML/MIN/1.73 M^2
GLUCOSE SERPL-MCNC: 101 MG/DL
GLUCOSE SERPL-MCNC: 120 MG/DL
HCT VFR BLD AUTO: 25 %
HCT VFR BLD AUTO: 25.1 %
HGB BLD-MCNC: 7.6 G/DL
HGB BLD-MCNC: 7.8 G/DL
HYPOCHROMIA BLD QL SMEAR: ABNORMAL
IMM GRANULOCYTES # BLD AUTO: ABNORMAL K/UL
IMM GRANULOCYTES # BLD AUTO: ABNORMAL K/UL
IMM GRANULOCYTES NFR BLD AUTO: ABNORMAL %
IMM GRANULOCYTES NFR BLD AUTO: ABNORMAL %
LYMPHOCYTES # BLD AUTO: ABNORMAL K/UL
LYMPHOCYTES NFR BLD: 13 %
LYMPHOCYTES NFR BLD: 19 %
MAGNESIUM SERPL-MCNC: 1.3 MG/DL
MCH RBC QN AUTO: 27.9 PG
MCH RBC QN AUTO: 28.9 PG
MCHC RBC AUTO-ENTMCNC: 30.3 G/DL
MCHC RBC AUTO-ENTMCNC: 31.2 G/DL
MCV RBC AUTO: 92 FL
MCV RBC AUTO: 93 FL
MONOCYTES # BLD AUTO: ABNORMAL K/UL
MONOCYTES NFR BLD: 2 %
MONOCYTES NFR BLD: 2 %
NEUTROPHILS NFR BLD: 76 %
NEUTROPHILS NFR BLD: 82 %
NRBC BLD-RTO: 0 /100 WBC
NRBC BLD-RTO: 0 /100 WBC
OVALOCYTES BLD QL SMEAR: ABNORMAL
PHOSPHATE SERPL-MCNC: 4.6 MG/DL
PLATELET # BLD AUTO: 368 K/UL
PLATELET # BLD AUTO: 371 K/UL
PLATELET BLD QL SMEAR: ABNORMAL
PMV BLD AUTO: 10.2 FL
PMV BLD AUTO: 10.9 FL
POIKILOCYTOSIS BLD QL SMEAR: SLIGHT
POLYCHROMASIA BLD QL SMEAR: ABNORMAL
POLYCHROMASIA BLD QL SMEAR: ABNORMAL
POTASSIUM SERPL-SCNC: 5.7 MMOL/L
POTASSIUM SERPL-SCNC: 6.4 MMOL/L
PROT SERPL-MCNC: 7.6 G/DL
PROT SERPL-MCNC: 7.8 G/DL
RBC # BLD AUTO: 2.7 M/UL
RBC # BLD AUTO: 2.72 M/UL
SODIUM SERPL-SCNC: 134 MMOL/L
SODIUM SERPL-SCNC: 135 MMOL/L
TACROLIMUS BLD-MCNC: 2.7 NG/ML
WBC # BLD AUTO: 4.39 K/UL
WBC # BLD AUTO: 4.4 K/UL

## 2019-01-31 PROCEDURE — 93005 ELECTROCARDIOGRAM TRACING: CPT

## 2019-01-31 PROCEDURE — 93010 ELECTROCARDIOGRAM REPORT: CPT | Mod: ,,, | Performed by: INTERNAL MEDICINE

## 2019-01-31 PROCEDURE — 99285 EMERGENCY DEPT VISIT HI MDM: CPT | Mod: ,,, | Performed by: PHYSICIAN ASSISTANT

## 2019-01-31 PROCEDURE — 80197 ASSAY OF TACROLIMUS: CPT

## 2019-01-31 PROCEDURE — 85007 BL SMEAR W/DIFF WBC COUNT: CPT

## 2019-01-31 PROCEDURE — 84100 ASSAY OF PHOSPHORUS: CPT

## 2019-01-31 PROCEDURE — 99284 EMERGENCY DEPT VISIT MOD MDM: CPT

## 2019-01-31 PROCEDURE — 36415 COLL VENOUS BLD VENIPUNCTURE: CPT

## 2019-01-31 PROCEDURE — 83735 ASSAY OF MAGNESIUM: CPT

## 2019-01-31 PROCEDURE — 99285 PR EMERGENCY DEPT VISIT,LEVEL V: ICD-10-PCS | Mod: ,,, | Performed by: PHYSICIAN ASSISTANT

## 2019-01-31 PROCEDURE — 85027 COMPLETE CBC AUTOMATED: CPT

## 2019-01-31 PROCEDURE — 80053 COMPREHEN METABOLIC PANEL: CPT

## 2019-01-31 PROCEDURE — 93010 EKG 12-LEAD: ICD-10-PCS | Mod: ,,, | Performed by: INTERNAL MEDICINE

## 2019-01-31 PROCEDURE — 25000003 PHARM REV CODE 250: Performed by: PHYSICIAN ASSISTANT

## 2019-01-31 PROCEDURE — 80053 COMPREHEN METABOLIC PANEL: CPT | Mod: 91

## 2019-01-31 RX ORDER — INDOMETHACIN 25 MG/1
50 CAPSULE ORAL
Status: DISCONTINUED | OUTPATIENT
Start: 2019-01-31 | End: 2019-01-31

## 2019-01-31 RX ORDER — FLUDROCORTISONE ACETATE 0.1 MG/1
100 TABLET ORAL DAILY
Qty: 30 TABLET | Refills: 11 | Status: SHIPPED | OUTPATIENT
Start: 2019-01-31 | End: 2019-02-15

## 2019-01-31 RX ORDER — ALBUTEROL SULFATE 2.5 MG/.5ML
15 SOLUTION RESPIRATORY (INHALATION)
Status: DISCONTINUED | OUTPATIENT
Start: 2019-01-31 | End: 2019-01-31

## 2019-01-31 RX ORDER — TACROLIMUS 1 MG/1
CAPSULE ORAL
Qty: 150 CAPSULE | Refills: 11 | Status: SHIPPED | OUTPATIENT
Start: 2019-01-31 | End: 2019-02-02

## 2019-01-31 RX ORDER — LANOLIN ALCOHOL/MO/W.PET/CERES
800 CREAM (GRAM) TOPICAL ONCE
Status: COMPLETED | OUTPATIENT
Start: 2019-01-31 | End: 2019-01-31

## 2019-01-31 RX ADMIN — MAGNESIUM OXIDE TAB 400 MG (241.3 MG ELEMENTAL MG) 800 MG: 400 (241.3 MG) TAB at 03:01

## 2019-01-31 NOTE — TELEPHONE ENCOUNTER
Received critical lab value of K 6.4- spoke to patient's caregiver and instructed to report to ER to be evaluated and treated. Notified Dr. Sharma. Also notified Florence in nephrology line room pt would not be able to make appt for removal.

## 2019-01-31 NOTE — TELEPHONE ENCOUNTER
----- Message from Heidi Sharma MD sent at 1/31/2019  1:14 PM CST -----  Labs reviewed. K: 6.4, patient has been sent to ER for hyperkalemia per Transplant protocol. FK: 2.7 - subtherapeutic.   Plan:  - increase tacrolimus to 3/2 (from 2/2)  - continue  mg BID  - continue patiromer (Veltassa)  - add fludrocortisone 0.1 mg daily for hyperkalemia  - repeat labs 2/2/19 and review with on-call hepatologist.  - encourage patient to restrict potassium intake, low potassium diet

## 2019-01-31 NOTE — PROGRESS NOTES
Received call from ED that patient presented to ED with hyperkalemia on outpatient labs, 6.4. Patient with chronic hyperkalemia. Recently placed on Veltassa 8.4 gram daily. ED treated with kayexalate, albuterol. Repeat K 5.7. Plan to increase Veltassa dose to 16.8 daily. Discussed with Dr. Patel, transplant surgeon. Patient to be discharged from ED with low K diet education. Will notify transplant coordinator to schedule follow up.

## 2019-01-31 NOTE — ED PROVIDER NOTES
"Encounter Date: 1/31/2019       History     Chief Complaint   Patient presents with    Abnormal Lab     Pt states he had labs drawn this am-called to come to ED for elevated potassium.      2:04 PM  Patient is a 53-year-old male that presents the ED after being referred by liver transplant services for hyperkalemia.  Patient was seen for followup on 01/28/2019 with mildly elevated potassium.  He was instructed to restrict potassium in his diet which she has been compliant with.  Labs today reveal potassium of 6.4.  He endorses his chronic dizziness with positional changes and chills. He had watery diarrhea x1 this morning after eating cereal for breakfast. He states he feels "fine". He denies any other symptoms.          Review of patient's allergies indicates:   Allergen Reactions    Penicillins Nausea And Vomiting and Rash     Full body rash from cefepime as well     Past Medical History:   Diagnosis Date    ARF (acute renal failure)     Hyperkalemia     Liver cirrhosis, alcoholic 09/30/2018     Past Surgical History:   Procedure Laterality Date    EGD (ESOPHAGOGASTRODUODENOSCOPY) N/A 12/24/2018    Performed by Duarte Jules MD at [facility] ENDO (2ND FLR)    EGD (ESOPHAGOGASTRODUODENOSCOPY) N/A 11/6/2018    Performed by Duarte Jules MD at [facility] ENDO (2ND FLR)    INSERTION, CATHETER, CENTRAL VENOUS, HEMODIALYSIS, TUNNELED N/A 11/7/2018    Performed by Brock Gonzalez MD at [facility] CATH LAB    LAPAROTOMY, EXPLORATORY N/A 11/16/2018    Performed by Amadou Lester Jr., MD at [facility] OR [facility] FLR    LAPAROTOMY, EXPLORATORY, AFTER LIVER TRANSPLANT N/A 11/16/2018    Performed by Amadou Lester Jr., MD at [facility] OR 2ND FLR    LAPAROTOMY, EXPLORATORY, AFTER LIVER TRANSPLANT, LIKELY  ABDOMINAL CLOSURE N/A 11/18/2018    Performed by Amadou Lester Jr., MD at [facility] OR 2ND FLR    TRANSPLANT, LIVER N/A 11/11/2018    [facility]     History reviewed. No pertinent family " history.  Social History     Tobacco Use    Smoking status: Never Smoker    Smokeless tobacco: Never Used   Substance Use Topics    Alcohol use: No     Frequency: Never     Comment: quit 9/21/18    Drug use: No     Review of Systems   Constitutional: Positive for chills. Negative for activity change, appetite change and fever.   HENT: Negative for congestion, ear pain and sore throat.    Eyes: Negative for pain.   Respiratory: Negative for cough and shortness of breath.    Cardiovascular: Negative for chest pain.   Gastrointestinal: Positive for diarrhea (x1 this AM). Negative for abdominal pain, blood in stool and nausea.   Genitourinary: Negative for dysuria and hematuria.   Musculoskeletal: Negative for back pain.   Skin: Negative for rash.   Neurological: Positive for dizziness (chronic). Negative for weakness and headaches.   Hematological: Does not bruise/bleed easily.       Physical Exam     Initial Vitals [01/31/19 1331]   BP Pulse Resp Temp SpO2   119/73 83 18 97.8 °F (36.6 °C) 100 %      MAP       --         Physical Exam    Vitals reviewed.  Constitutional: He appears well-developed and well-nourished. He is not diaphoretic.   Pleasant male in NAD and nontoxic appearing resting comfortably on exam bed.   HENT:   Head: Normocephalic and atraumatic.   Nose: Nose normal.   Eyes: Conjunctivae and EOM are normal.   No scleral icterus.   Neck: Normal range of motion.   Cardiovascular: Normal rate, regular rhythm and normal heart sounds. Exam reveals no friction rub.    No murmur heard.  Pulmonary/Chest: Breath sounds normal. No respiratory distress. He has no wheezes. He has no rales.   Abdominal: Soft. Bowel sounds are normal. He exhibits no distension and no mass. There is no tenderness. There is no rigidity, no rebound and no guarding.   Old surgical scar noted. No signs of infection.   Musculoskeletal: Normal range of motion.   Neurological: He is alert and oriented to person, place, and time. He has  normal strength. No sensory deficit.   Skin: Skin is warm and dry. No erythema.   Ecchymosis noted to RLQ. No jaundice.   Psychiatric: He has a normal mood and affect. Thought content normal.         ED Course   Procedures  Labs Reviewed   CBC W/ AUTO DIFFERENTIAL - Abnormal; Notable for the following components:       Result Value    RBC 2.70 (*)     Hemoglobin 7.8 (*)     Hematocrit 25.0 (*)     MCHC 31.2 (*)     RDW 16.0 (*)     Platelets 368 (*)     Gran% 76.0 (*)     Mono% 2.0 (*)     Basophil% 3.0 (*)     Platelet Estimate Increased (*)     All other components within normal limits   COMPREHENSIVE METABOLIC PANEL - Abnormal; Notable for the following components:    Sodium 134 (*)     Potassium 5.7 (*)     CO2 20 (*)     Glucose 120 (*)     BUN, Bld 33 (*)     Albumin 2.7 (*)     All other components within normal limits   MAGNESIUM - Abnormal; Notable for the following components:    Magnesium 1.3 (*)     All other components within normal limits   PHOSPHORUS - Abnormal; Notable for the following components:    Phosphorus 4.6 (*)     All other components within normal limits        ECG Results          EKG 12-lead (In process)  Result time 01/31/19 15:11:20    In process by Interface, Lab In [facility] (01/31/19 15:11:20)                 Narrative:    Test Reason :     Vent. Rate : 081 BPM     Atrial Rate : 081 BPM     P-R Int : 126 ms          QRS Dur : 090 ms      QT Int : 374 ms       P-R-T Axes : 065 050 065 degrees     QTc Int : 434 ms    Normal sinus rhythm  Normal ECG  When compared with ECG of 02-JAN-2019 08:56,  ST no longer elevated in Lateral leads  Nonspecific T wave abnormality no longer evident in Inferior leads  QT has shortened    Referred By: [email or web address]   SELF           Confirmed By:                             Imaging Results    None          Medical Decision Making:   History:   Old Medical Records: I decided to obtain old medical records.  Old Records Summarized: records from clinic visits and  records from previous admission(s).       <> Summary of Records: Labs this AM with K at 6.4.  Initial Assessment:   Patient is a 53-year-old male with a history of liver transplant on 11/11/2018 who presents the ED with hyperkalemia.  Differential Diagnosis:   Includes but is not limited to hyperkalemia, other electrolyte abnormalities.  ED Management:  Will repeat labs and touch base with liver transplant services.    2:19 PM.  I discussed case with liver transplant service who will evaluate patient. Will await recommendations.    3:07 PM. Repeat labs in the ED with K of 5.7.  ECG with NSR at 81 bpm. No hyper K changes. Normocytic anemia at patient's baseline with H/H at 7.8/25.  No leukocytosis.  No DEL.  No hyperbilirubinemia or transaminitis.  Hypomagnesemia at 1.3 and hyperphosphatemia had 4.6.  Given improvements in potassium here in the ED, WILL NOT GIVE HYPERKALEMIC MEDICATIONS IN THE ED and notify liver transplant service for further recommendations.    3:16 PM. Patient with chronic hyperkalemia. He was recently Rx patiromer calcium sorbitex (VELTASSA) 16.8 gram PwPk. I spoke with Liver Transplant clinical pharmacist who recommends that patient takes TWO packs of Veltessa once a day and remains on a strict low K diet.  I will give magnesium oral replacement in the ED. He is to continue home magnesium as prescribed. They will have him follow up in clinic. Patient given instructions.  He verbalized his understanding.  He is comfortable with this plan and stable for discharge    Other:   I have discussed this case with another health care provider.       <> Summary of the Discussion: I have discussed case with liver transplant service providers and clinical pharmacist.     I have reviewed patient's chart and discuss this case with my supervising MD.     Phuong Coleman PA-C  Emergent Department  [facility]                        Clinical Impression:   The primary encounter diagnosis was Hyperkalemia.  Diagnoses of Serum potassium elevated, Hypomagnesemia, and Hx of liver transplant were also pertinent to this visit.      Disposition:   Disposition: Discharged  Condition: Stable                        Phuong Coleman PA-C  01/31/19 0450

## 2019-01-31 NOTE — ED NOTES
Spoke with Phuong, aware of K 5.7. Patient requests to be discharged and go to get Dialysis cath removed.Will hold meds, update regarding POC

## 2019-01-31 NOTE — TELEPHONE ENCOUNTER
Labs reviewed. K: 6.4, patient has been sent to ER for hyperkalemia per Transplant protocol. FK: 2.7 - subtherapeutic.   Plan:  - increase tacrolimus to 3/2 (from 2/2)  - continue  mg BID  - continue patiromer (Veltassa)  - add fludrocortisone 0.1 mg daily for hyperkalemia  - repeat labs 2/2/19 and review with on-call hepatologist.  - encourage patient to restrict potassium intake, low potassium diet    This recommendation(s) has been communicated to the patient's transplant coordinator.

## 2019-01-31 NOTE — ED TRIAGE NOTES
Patient states he had labs drawn this am, called to come to ED for elevated K level. Denieis Cp/SOB or weakness. Discharged Monday for liver transplant 11/11

## 2019-01-31 NOTE — ED NOTES
Patient identifiers verified and correct for Mr Enciso  C/C: Elevated K   APPEARANCE: awake and alert in NAD.  SKIN: warm, dry and intact. No breakdown or bruising. Abdominal incision C/D/I. Right chest DL dialysis access  MUSCULOSKELETAL: Patient moving all extremities spontaneously, no obvious swelling or deformities noted. Ambulates independently.  RESPIRATORY: Denies shortness of breath.Respirations unlabored.   CARDIAC: Denies CP, 2+ distal pulses; no peripheral edema  ABDOMEN: S/ND/NT, Denies nausea  : voids spontaneously, denies difficulty  Neurologic: AAO x 4; follows commands equal strength in all extremities; denies numbness/tingling. Denies dizziness  Upon admit to ED Positive gen weakness

## 2019-01-31 NOTE — DISCHARGE INSTRUCTIONS
your prescriptions that Liver Transplant prescribed. Follow a low potassium diet. Follow up closely. Return to the emergency department for new or worsening symptoms.    Take TWO PACKETS patiromer calcium sorbitex (VELTASSA) 16.8 gram PwPk once a day!    Future Appointments   Date Time Provider Department Center   2/11/2019 10:15 AM [facility]       Our goal in the emergency department is to always give you outstanding care and exceptional service. You may receive a survey by mail or e-mail in the next week regarding your experience in our ED. We would greatly appreciate your completing and returning the survey. Your feedback provides us with a way to recognize our staff who give very good care and it helps us learn how to improve when your experience was below our aspiration of excellence.

## 2019-01-31 NOTE — ED NOTES
Patient states he needs to leave to get dialysis cath taken out at appointment at 1430, to INTAKE room

## 2019-01-31 NOTE — TELEPHONE ENCOUNTER
Spoke to pt's mother and informed her for pt to increase prograf to 3 mg in am and 2 mg in pm. Florinef not currently available so pt will increase dose of Veltassa per ED instructions. Will repeat labs 2/2/19- lab orders faxed to [facility].

## 2019-02-01 ENCOUNTER — TELEPHONE (OUTPATIENT)
Dept: PHARMACY | Facility: CLINIC | Age: 54
End: 2019-02-01

## 2019-02-01 NOTE — TELEPHONE ENCOUNTER
Documentation Only:    Prior Authorization for [employer] has been approved for 1 year    Approval dates: 01.01.2019 until 01.31.2020    Patient co-pay: $483.63    Patient Assistance IS required.    Patient has been automatically enrolled in [employer] evoucher program which will reduce patient copay to $25.     Forwarding to clinical pharmacist for consult and shipment.    VIANNEY 7:47am

## 2019-02-01 NOTE — TELEPHONE ENCOUNTER
Veltassa 16.8 g (dose increase) refill confirmed. We will ship Veltassa 16.8 g refill on  via fedex to arrive on . $25.00 copay- 004. Confirmed 2 patient identifiers - name and .     Patient has 4 days of Veltassa remaining and takes it around lunchtime daily.  Full consultation declined. Pt reports they are not having any side effects so far. No missed doses, no new medications, no new allergies or health conditions reported at this time.. All questions answered and addressed to patients satisfaction. Pt verbalized understanding.

## 2019-02-02 ENCOUNTER — LAB VISIT (OUTPATIENT)
Dept: LAB | Facility: HOSPITAL | Age: 54
End: 2019-02-02
Attending: INTERNAL MEDICINE
Payer: COMMERCIAL

## 2019-02-02 ENCOUNTER — NURSE TRIAGE (OUTPATIENT)
Dept: ADMINISTRATIVE | Facility: CLINIC | Age: 54
End: 2019-02-02

## 2019-02-02 DIAGNOSIS — D89.810 ACUTE GRAFT-VERSUS-HOST DISEASE OF LIVER: Primary | ICD-10-CM

## 2019-02-02 DIAGNOSIS — T86.49 ACUTE GRAFT-VERSUS-HOST DISEASE OF LIVER: Primary | ICD-10-CM

## 2019-02-02 LAB
ALBUMIN SERPL BCP-MCNC: 2.9 G/DL
ALP SERPL-CCNC: 138 U/L
ALT SERPL W/O P-5'-P-CCNC: 15 U/L
ANION GAP SERPL CALC-SCNC: 8 MMOL/L
ANISOCYTOSIS BLD QL SMEAR: SLIGHT
AST SERPL-CCNC: 15 U/L
BASOPHILS # BLD AUTO: ABNORMAL K/UL
BASOPHILS NFR BLD: 0 %
BILIRUB SERPL-MCNC: 0.6 MG/DL
BUN SERPL-MCNC: 35 MG/DL
CALCIUM SERPL-MCNC: 10.1 MG/DL
CHLORIDE SERPL-SCNC: 106 MMOL/L
CO2 SERPL-SCNC: 21 MMOL/L
CREAT SERPL-MCNC: 1.3 MG/DL
DIFFERENTIAL METHOD: ABNORMAL
EOSINOPHIL # BLD AUTO: ABNORMAL K/UL
EOSINOPHIL NFR BLD: 1 %
ERYTHROCYTE [DISTWIDTH] IN BLOOD BY AUTOMATED COUNT: 15.9 %
EST. GFR  (AFRICAN AMERICAN): >60 ML/MIN/1.73 M^2
EST. GFR  (NON AFRICAN AMERICAN): >60 ML/MIN/1.73 M^2
GLUCOSE SERPL-MCNC: 95 MG/DL
HCT VFR BLD AUTO: 25.9 %
HGB BLD-MCNC: 7.9 G/DL
IMM GRANULOCYTES # BLD AUTO: ABNORMAL K/UL
IMM GRANULOCYTES NFR BLD AUTO: ABNORMAL %
LYMPHOCYTES # BLD AUTO: ABNORMAL K/UL
LYMPHOCYTES NFR BLD: 18 %
MCH RBC QN AUTO: 28.3 PG
MCHC RBC AUTO-ENTMCNC: 30.5 G/DL
MCV RBC AUTO: 93 FL
MONOCYTES # BLD AUTO: ABNORMAL K/UL
MONOCYTES NFR BLD: 1 %
NEUTROPHILS NFR BLD: 78 %
NEUTS BAND NFR BLD MANUAL: 2 %
NRBC BLD-RTO: 0 /100 WBC
OVALOCYTES BLD QL SMEAR: ABNORMAL
PLATELET # BLD AUTO: 383 K/UL
PLATELET BLD QL SMEAR: ABNORMAL
PMV BLD AUTO: 10.8 FL
POIKILOCYTOSIS BLD QL SMEAR: SLIGHT
POTASSIUM SERPL-SCNC: 5.4 MMOL/L
PROT SERPL-MCNC: 7.8 G/DL
RBC # BLD AUTO: 2.79 M/UL
SODIUM SERPL-SCNC: 135 MMOL/L
TACROLIMUS BLD-MCNC: 2.8 NG/ML
TOXIC GRANULES BLD QL SMEAR: PRESENT
WBC # BLD AUTO: 3.78 K/UL

## 2019-02-02 PROCEDURE — 80053 COMPREHEN METABOLIC PANEL: CPT

## 2019-02-02 PROCEDURE — 80197 ASSAY OF TACROLIMUS: CPT

## 2019-02-02 PROCEDURE — 85027 COMPLETE CBC AUTOMATED: CPT

## 2019-02-02 PROCEDURE — 85007 BL SMEAR W/DIFF WBC COUNT: CPT

## 2019-02-02 NOTE — TELEPHONE ENCOUNTER
Reviewed labs dated 2/2/19 with Dr. Alicea.  Plan increase Prograf to 3mg bid and repeat labs Monday. Patient's wife instructed as well as patient and  His mother.  Patient's mother read the instructions back correctly.

## 2019-02-02 NOTE — TELEPHONE ENCOUNTER
Reason for Disposition   Patient sounds very sick or weak to the triager    Protocols used: ST DIZZINESS - [id number]    Liver Transplant, no bpa     Patient's mother called to ask if he can cut down on the gabapentin because of dizziness. He takes his time getting up from the seated position. He doesn't have a way of checking his blood sugars and allegedly had this problem since before leaving the hospital after his transplant. Patient advised to go to the ED but his mom states they don't have transportation. Unsure what they will do but informed her that the transplant team will be notified.

## 2019-02-03 PROBLEM — K76.9 PLEURAL EFFUSION ASSOCIATED WITH HEPATIC DISORDER: Status: RESOLVED | Noted: 2018-10-27 | Resolved: 2019-02-03

## 2019-02-03 PROBLEM — R11.0 NAUSEA: Status: RESOLVED | Noted: 2018-12-11 | Resolved: 2019-02-03

## 2019-02-03 PROBLEM — E43 SEVERE MALNUTRITION: Status: RESOLVED | Noted: 2018-12-14 | Resolved: 2019-02-03

## 2019-02-03 PROBLEM — J91.8 PLEURAL EFFUSION ASSOCIATED WITH HEPATIC DISORDER: Status: RESOLVED | Noted: 2018-10-27 | Resolved: 2019-02-03

## 2019-02-04 ENCOUNTER — LAB VISIT (OUTPATIENT)
Dept: LAB | Facility: HOSPITAL | Age: 54
End: 2019-02-04
Attending: INTERNAL MEDICINE
Payer: COMMERCIAL

## 2019-02-04 ENCOUNTER — TELEPHONE (OUTPATIENT)
Dept: TRANSPLANT | Facility: CLINIC | Age: 54
End: 2019-02-04

## 2019-02-04 ENCOUNTER — TELEPHONE (OUTPATIENT)
Dept: HEPATOLOGY | Facility: CLINIC | Age: 54
End: 2019-02-04

## 2019-02-04 DIAGNOSIS — T86.49 ACUTE GRAFT-VERSUS-HOST DISEASE OF LIVER: Primary | ICD-10-CM

## 2019-02-04 DIAGNOSIS — D89.810 ACUTE GRAFT-VERSUS-HOST DISEASE OF LIVER: Primary | ICD-10-CM

## 2019-02-04 LAB
ALBUMIN SERPL BCP-MCNC: 2.9 G/DL
ALP SERPL-CCNC: 134 U/L
ALT SERPL W/O P-5'-P-CCNC: 13 U/L
ANION GAP SERPL CALC-SCNC: 5 MMOL/L
ANISOCYTOSIS BLD QL SMEAR: SLIGHT
AST SERPL-CCNC: 13 U/L
BASOPHILS # BLD AUTO: ABNORMAL K/UL
BASOPHILS NFR BLD: 0 %
BILIRUB SERPL-MCNC: 0.6 MG/DL
BUN SERPL-MCNC: 33 MG/DL
CALCIUM SERPL-MCNC: 9.9 MG/DL
CHLORIDE SERPL-SCNC: 108 MMOL/L
CO2 SERPL-SCNC: 22 MMOL/L
CREAT SERPL-MCNC: 1.3 MG/DL
DIFFERENTIAL METHOD: ABNORMAL
EOSINOPHIL # BLD AUTO: ABNORMAL K/UL
EOSINOPHIL NFR BLD: 0 %
ERYTHROCYTE [DISTWIDTH] IN BLOOD BY AUTOMATED COUNT: 16 %
EST. GFR  (AFRICAN AMERICAN): >60 ML/MIN/1.73 M^2
EST. GFR  (NON AFRICAN AMERICAN): >60 ML/MIN/1.73 M^2
GLUCOSE SERPL-MCNC: 85 MG/DL
HCT VFR BLD AUTO: 24.5 %
HGB BLD-MCNC: 7.7 G/DL
IMM GRANULOCYTES # BLD AUTO: ABNORMAL K/UL
IMM GRANULOCYTES NFR BLD AUTO: ABNORMAL %
LYMPHOCYTES # BLD AUTO: ABNORMAL K/UL
LYMPHOCYTES NFR BLD: 23 %
MCH RBC QN AUTO: 28.3 PG
MCHC RBC AUTO-ENTMCNC: 31.4 G/DL
MCV RBC AUTO: 90 FL
MONOCYTES # BLD AUTO: ABNORMAL K/UL
MONOCYTES NFR BLD: 1 %
NEUTROPHILS NFR BLD: 76 %
NRBC BLD-RTO: 0 /100 WBC
OVALOCYTES BLD QL SMEAR: ABNORMAL
PLATELET # BLD AUTO: 338 K/UL
PLATELET BLD QL SMEAR: ABNORMAL
PMV BLD AUTO: 10.5 FL
POIKILOCYTOSIS BLD QL SMEAR: SLIGHT
POTASSIUM SERPL-SCNC: 5.3 MMOL/L
PROT SERPL-MCNC: 7.6 G/DL
RBC # BLD AUTO: 2.72 M/UL
SODIUM SERPL-SCNC: 135 MMOL/L
TACROLIMUS BLD-MCNC: 4.3 NG/ML
WBC # BLD AUTO: 3.19 K/UL

## 2019-02-04 PROCEDURE — 80053 COMPREHEN METABOLIC PANEL: CPT

## 2019-02-04 PROCEDURE — 85007 BL SMEAR W/DIFF WBC COUNT: CPT

## 2019-02-04 PROCEDURE — 80197 ASSAY OF TACROLIMUS: CPT

## 2019-02-04 PROCEDURE — 85027 COMPLETE CBC AUTOMATED: CPT

## 2019-02-04 RX ORDER — TACROLIMUS 1 MG/1
CAPSULE ORAL
Qty: 150 CAPSULE | Refills: 11 | Status: SHIPPED | OUTPATIENT
Start: 2019-02-04 | End: 2019-02-14

## 2019-02-04 NOTE — TELEPHONE ENCOUNTER
Liver transplant blood tests reviewed. Labs stable, no change in immunosuppression. Please continue to monitor liver tests per transplant protocol.    This recommendation(s) has been communicated to the patient's transplant coordinator.

## 2019-02-04 NOTE — TELEPHONE ENCOUNTER
----- Message from Loretta Blake sent at 2/4/2019  3:07 PM CST -----  Contact: patient  Please call pt at [phone]    Patient would like to confirm procedure that is scheduled on 02/05/19    Thank you

## 2019-02-04 NOTE — TELEPHONE ENCOUNTER
Spoke to pt and confirmed upcoming appts including tomorrow to get perm cath removed. Pt verbalizes understanding.

## 2019-02-04 NOTE — TELEPHONE ENCOUNTER
Spoke to pt regarding [facility] on call phone call, no reports of dizziness anymore. Pt states he is feeling well. Labs awaiting review of MD. Vital stable. Pt will update with any change.

## 2019-02-04 NOTE — SUBJECTIVE & OBJECTIVE
Interval History: ID called back to evaluate patient after development of empyema since last evaluation    Review of Systems  Objective:     Vital Signs (Most Recent):  Temp: 97.8 °F (36.6 °C) (12/03/18 1626)  Pulse: 106 (12/03/18 1626)  Resp: (!) 31 (12/03/18 1626)  BP: 118/80 (12/03/18 1626)  SpO2: 98 % (12/03/18 1626) Vital Signs (24h Range):  Temp:  [95.2 °F (35.1 °C)-98.5 °F (36.9 °C)] 97.8 °F (36.6 °C)  Pulse:  [] 106  Resp:  [20-32] 31  SpO2:  [96 %-99 %] 98 %  BP: (104-124)/(66-80) 118/80     Weight: 75.7 kg (166 lb 14.2 oz)  Body mass index is 23.95 kg/m².    Estimated Creatinine Clearance: 25.2 mL/min (A) (based on SCr of 3.5 mg/dL (H)).    Physical Exam   Constitutional: He is oriented to person, place, and time. He appears well-developed and well-nourished.   HENT:   Head: Normocephalic and atraumatic.   Eyes: Conjunctivae and EOM are normal. Pupils are equal, round, and reactive to light.   Neck: Normal range of motion. Neck supple.   Cardiovascular: Normal rate, regular rhythm and normal heart sounds.   Pulmonary/Chest: Effort normal. He has rales.   Abdominal: Soft. Bowel sounds are normal. He exhibits no distension. There is no tenderness. There is no rebound.   Musculoskeletal: Normal range of motion. He exhibits no edema, tenderness or deformity.   Neurological: He is alert and oriented to person, place, and time.   Skin: No rash noted. No erythema.       Significant Labs:   Blood Culture:   Recent Labs   Lab 11/14/18  1500 11/21/18  1730 11/21/18  1742 11/30/18  1102 11/30/18  1107   LABBLOO No growth after 5 days. No growth after 5 days. No growth after 5 days. No Growth to date  No Growth to date  No Growth to date  No Growth to date No Growth to date  No Growth to date  No Growth to date  No Growth to date     BMP:   Recent Labs   Lab 12/03/18  0630   *      K 4.7      CO2 22*   BUN 53*   CREATININE 3.5*   CALCIUM 8.0*   MG 1.8     CBC:   Recent Labs   Lab  The patient is a 81y Female complaining of R hip pain s/p fall. 12/02/18  0650 12/02/18  1016 12/02/18  2005 12/03/18  0630   WBC 13.25*  --   --  11.64   HGB 6.4* 6.9* 8.1* 7.1*   HCT 20.7* 22.1* 25.3* 22.2*     --   --  241     CMP:   Recent Labs   Lab 12/02/18  0650 12/03/18  0630   * 136   K 4.4 4.7    105   CO2 24 22*   * 223*   BUN 42* 53*   CREATININE 3.0* 3.5*   CALCIUM 8.0* 8.0*   PROT 4.5* 4.7*   ALBUMIN 1.4* 1.4*   BILITOT 2.8* 2.6*   ALKPHOS 242* 261*   AST 21 17   ALT 30 26   ANIONGAP 7* 9   EGFRNONAA 22.7* 18.8*     Microbiology Results (last 7 days)     Procedure Component Value Units Date/Time    Urine culture [[id number]] Collected:  11/30/18 1201    Order Status:  Completed Specimen:  Urine, Clean Catch Updated:  12/03/18 1455     Urine Culture, Routine --     LANIE GLABRATA  > 100,000 cfu/ml  Treatment of asymptomatic candiduria is not recommended (except for   specific populations). Candida isolated in the urine typically   represents colonization. If an indwelling urinary catheter is present  it should be removed or replaced.      Culture, Body Fluid (Aerobic) w/ GS [[id number]] Collected:  11/30/18 1617    Order Status:  Completed Specimen:  Body Fluid from Pleural Fluid Updated:  12/03/18 1310     AEROBIC CULTURE - FLUID No growth     Gram Stain Result Rare WBC's      No organisms seen    Blood culture [[id number]] Collected:  11/30/18 1107    Order Status:  Completed Specimen:  Blood Updated:  12/03/18 1212     Blood Culture, Routine No Growth to date     Blood Culture, Routine No Growth to date     Blood Culture, Routine No Growth to date     Blood Culture, Routine No Growth to date    Narrative:       Draw 15 min apart    Blood culture [[id number]] Collected:  11/30/18 1102    Order Status:  Completed Specimen:  Blood Updated:  12/03/18 1212     Blood Culture, Routine No Growth to date     Blood Culture, Routine No Growth to date     Blood Culture, Routine No Growth to date     Blood Culture, Routine No Growth to date     Narrative:       Draw 15 min apart    Fungus culture [[id number]] Collected:  11/30/18 1617    Order Status:  Completed Specimen:  Body Fluid from Pleural Fluid Updated:  12/03/18 0949     Fungus (Mycology) Culture Culture in progress    Narrative:       Right    Fungus culture [[id number]] Collected:  11/18/18 1530    Order Status:  Completed Specimen:  Body Fluid from Peritoneal Fluid Updated:  12/03/18 0842     Fungus (Mycology) Culture Culture in progress     Fungus (Mycology) Culture No fungus isolated after 2 weeks    Narrative:       PELVIC FLUID    Fungus culture [[id number]] Collected:  11/18/18 1530    Order Status:  Completed Specimen:  Body Fluid from Abdomen Updated:  12/03/18 0842     Fungus (Mycology) Culture Culture in progress     Fungus (Mycology) Culture No fungus isolated after 2 weeks    Narrative:       Abdominal Fluid    Urine culture [[id number]] Collected:  12/01/18 1530    Order Status:  Completed Specimen:  Urine, Clean Catch Updated:  12/02/18 1958     Urine Culture, Routine No growth    Gram stain [[id number]] Collected:  11/30/18 1617    Order Status:  Canceled Specimen:  Body Fluid from Pleural Fluid Updated:  11/30/18 2331    Culture, Anaerobic [[id number]] Collected:  11/30/18 1617    Order Status:  Sent Specimen:  Body Fluid from Pleural Fluid Updated:  11/30/18 2330    Fungus culture [[id number]] Collected:  11/11/18 0415    Order Status:  Completed Specimen:  Pleural Fluid from Ascites Updated:  11/27/18 1017     Fungus (Mycology) Culture Culture in progress     Fungus (Mycology) Culture No fungus isolated after 2 weeks    Aerobic culture [[id number]]  (Susceptibility) Collected:  11/22/18 1307    Order Status:  Completed Specimen:  Body Fluid from Liver Updated:  11/27/18 0928     Aerobic Bacterial Culture --     ENTEROCOCCUS FAECIUM VRE  Rare      Narrative:       Perihepatic fluid collection    Blood culture [[id number]] Collected:  11/21/18 1730    Order Status:  Completed Specimen:   Blood from Peripheral, Hand, Left Updated:  11/26/18 2012     Blood Culture, Routine No growth after 5 days.    Blood culture [[id number]] Collected:  11/21/18 5568    Order Status:  Completed Specimen:  Blood from Peripheral, Forearm, Right Updated:  11/26/18 2012     Blood Culture, Routine No growth after 5 days.          Significant Imaging: I have reviewed all pertinent imaging results/findings within the past 24 hours.

## 2019-02-04 NOTE — PATIENT INSTRUCTIONS
Maintenance:  -Instructed to f/u with PCP for regular health maintenance care including cancer screenings and BMD  -Reviewed need to avoid sun exposure with use of sunblock, hats, long sleeves related to increased risk of skin cancers  -Recommend f/u with Dermatology for annual skin checks  - continue anti-hypertensive agents, and check with PCP, goal /80 mm Hg  - post liver transplant counseling

## 2019-02-05 ENCOUNTER — HOSPITAL ENCOUNTER (OUTPATIENT)
Facility: HOSPITAL | Age: 54
Discharge: HOME | End: 2019-02-05
Attending: INTERNAL MEDICINE | Admitting: INTERNAL MEDICINE
Payer: COMMERCIAL

## 2019-02-05 DIAGNOSIS — N17.9 ACUTE RENAL FAILURE: ICD-10-CM

## 2019-02-05 PROCEDURE — 36589 REMOVAL TUNNELED CV CATH: CPT | Mod: ,,, | Performed by: INTERNAL MEDICINE

## 2019-02-05 PROCEDURE — 36589 PR REMOVAL TUNNELED CV CATH W/O SUBQ PORT OR PUMP: ICD-10-PCS | Mod: ,,, | Performed by: INTERNAL MEDICINE

## 2019-02-05 PROCEDURE — 36589 REMOVAL TUNNELED CV CATH: CPT | Performed by: INTERNAL MEDICINE

## 2019-02-05 NOTE — PROCEDURES
DATE OF PROCEDURE: 2/5/19    PROCEDURE TYPE:   1. Removal of right Internal Jugular Vein tunneled dialysis catheter.     INDICATION: renal recovery     PROCEDURE NOTE: After informed consent was obtained, the area of Mr. Enciso's catheter and right chest/neck were sterilely prepped and draped in usual fashion. Anesthesia was obtained with 2% lidocaine with epinephrine, and the subcutaneous cuff was blunt dissected free. The catheter was then removed in total, and a compression dressing was applied to the exit site. Mr. Enciso tolerated the procedure well. There were no immediate complications. Estimated blood loss was less than 1 mL. No sedation was given.

## 2019-02-05 NOTE — Clinical Note
dry, intact, no bleeding and no hematoma. Instructions given verbal and written for care of removal site.

## 2019-02-06 ENCOUNTER — TELEPHONE (OUTPATIENT)
Dept: ADMINISTRATIVE | Facility: CLINIC | Age: 54
End: 2019-02-06

## 2019-02-06 NOTE — TELEPHONE ENCOUNTER
Home Health SOC 01/29/2019 - 03/29/2019 with [facility] ([facility]) - Dr. Geetha Nolan. Patient received SN, PT and OT services.

## 2019-02-07 ENCOUNTER — TELEPHONE (OUTPATIENT)
Dept: TRANSPLANT | Facility: CLINIC | Age: 54
End: 2019-02-07

## 2019-02-07 NOTE — TELEPHONE ENCOUNTER
Informed pt labs reviewed and are stable, no med changes needed. Will repeat labs per [facility] on Monday 2/11. He verbalizes understanding.

## 2019-02-11 ENCOUNTER — HOSPITAL ENCOUNTER (EMERGENCY)
Facility: HOSPITAL | Age: 54
Discharge: HOME | End: 2019-02-11
Attending: EMERGENCY MEDICINE
Payer: COMMERCIAL

## 2019-02-11 ENCOUNTER — HOSPITAL ENCOUNTER (OUTPATIENT)
Dept: RADIOLOGY | Facility: HOSPITAL | Age: 54
Discharge: HOME | End: 2019-02-11
Attending: INTERNAL MEDICINE
Payer: COMMERCIAL

## 2019-02-11 ENCOUNTER — TELEPHONE (OUTPATIENT)
Dept: TRANSPLANT | Facility: CLINIC | Age: 54
End: 2019-02-11

## 2019-02-11 VITALS
TEMPERATURE: 98 F | OXYGEN SATURATION: 99 % | HEART RATE: 81 BPM | DIASTOLIC BLOOD PRESSURE: 75 MMHG | SYSTOLIC BLOOD PRESSURE: 129 MMHG | WEIGHT: 144 LBS | RESPIRATION RATE: 18 BRPM | HEIGHT: 70 IN | BODY MASS INDEX: 20.62 KG/M2

## 2019-02-11 DIAGNOSIS — D64.9 ANEMIA REQUIRING TRANSFUSIONS: Primary | ICD-10-CM

## 2019-02-11 DIAGNOSIS — D72.819 LEUKOPENIA, UNSPECIFIED TYPE: ICD-10-CM

## 2019-02-11 DIAGNOSIS — D64.9 ANEMIA: ICD-10-CM

## 2019-02-11 DIAGNOSIS — Z94.4 LIVER REPLACED BY TRANSPLANT: ICD-10-CM

## 2019-02-11 PROBLEM — D70.9 NEUTROPENIA, UNSPECIFIED: Status: ACTIVE | Noted: 2019-02-11

## 2019-02-11 PROBLEM — N18.6 ESRD (END STAGE RENAL DISEASE) ON DIALYSIS: Status: RESOLVED | Noted: 2019-01-28 | Resolved: 2019-02-11

## 2019-02-11 PROBLEM — Z99.2 ESRD (END STAGE RENAL DISEASE) ON DIALYSIS: Status: RESOLVED | Noted: 2019-01-28 | Resolved: 2019-02-11

## 2019-02-11 LAB
ABO + RH BLD: NORMAL
ANISOCYTOSIS BLD QL SMEAR: SLIGHT
BASOPHILS # BLD AUTO: ABNORMAL K/UL
BASOPHILS NFR BLD: 2 %
BLD GP AB SCN CELLS X3 SERPL QL: NORMAL
BLD PROD TYP BPU: NORMAL
BLD PROD TYP BPU: NORMAL
BLOOD UNIT EXPIRATION DATE: NORMAL
BLOOD UNIT EXPIRATION DATE: NORMAL
BLOOD UNIT TYPE CODE: 5100
BLOOD UNIT TYPE CODE: 5100
BLOOD UNIT TYPE: NORMAL
BLOOD UNIT TYPE: NORMAL
CODING SYSTEM: NORMAL
CODING SYSTEM: NORMAL
DIFFERENTIAL METHOD: ABNORMAL
DISPENSE STATUS: NORMAL
DISPENSE STATUS: NORMAL
EOSINOPHIL # BLD AUTO: ABNORMAL K/UL
EOSINOPHIL NFR BLD: 0 %
ERYTHROCYTE [DISTWIDTH] IN BLOOD BY AUTOMATED COUNT: 16.9 %
HCT VFR BLD AUTO: 24.9 %
HGB BLD-MCNC: 7.4 G/DL
HYPOCHROMIA BLD QL SMEAR: ABNORMAL
IMM GRANULOCYTES # BLD AUTO: ABNORMAL K/UL
IMM GRANULOCYTES NFR BLD AUTO: ABNORMAL %
LDH SERPL L TO P-CCNC: 256 U/L
LYMPHOCYTES # BLD AUTO: ABNORMAL K/UL
LYMPHOCYTES NFR BLD: 44 %
MCH RBC QN AUTO: 27.8 PG
MCHC RBC AUTO-ENTMCNC: 29.7 G/DL
MCV RBC AUTO: 94 FL
MONOCYTES # BLD AUTO: ABNORMAL K/UL
MONOCYTES NFR BLD: 2 %
NEUTROPHILS NFR BLD: 52 %
NRBC BLD-RTO: 0 /100 WBC
NUM UNITS TRANS PACKED RBC: NORMAL
OVALOCYTES BLD QL SMEAR: ABNORMAL
PLATELET # BLD AUTO: 336 K/UL
PMV BLD AUTO: 10.3 FL
POIKILOCYTOSIS BLD QL SMEAR: SLIGHT
POLYCHROMASIA BLD QL SMEAR: ABNORMAL
RBC # BLD AUTO: 2.66 M/UL
TRANS ERYTHROCYTES VOL PATIENT: NORMAL ML
WBC # BLD AUTO: 2.1 K/UL

## 2019-02-11 PROCEDURE — 93010 EKG 12-LEAD: ICD-10-PCS | Mod: ,,, | Performed by: INTERNAL MEDICINE

## 2019-02-11 PROCEDURE — 93975 US LIVER TRANSPLANT POST: ICD-10-PCS | Mod: 26,,, | Performed by: RADIOLOGY

## 2019-02-11 PROCEDURE — 85027 COMPLETE CBC AUTOMATED: CPT

## 2019-02-11 PROCEDURE — P9021 RED BLOOD CELLS UNIT: HCPCS

## 2019-02-11 PROCEDURE — 93010 ELECTROCARDIOGRAM REPORT: CPT | Mod: ,,, | Performed by: INTERNAL MEDICINE

## 2019-02-11 PROCEDURE — 25000003 PHARM REV CODE 250: Performed by: EMERGENCY MEDICINE

## 2019-02-11 PROCEDURE — 99291 PR CRITICAL CARE, E/M 30-74 MINUTES: ICD-10-PCS | Mod: ,,, | Performed by: EMERGENCY MEDICINE

## 2019-02-11 PROCEDURE — 85007 BL SMEAR W/DIFF WBC COUNT: CPT

## 2019-02-11 PROCEDURE — 36430 TRANSFUSION BLD/BLD COMPNT: CPT

## 2019-02-11 PROCEDURE — 93005 ELECTROCARDIOGRAM TRACING: CPT

## 2019-02-11 PROCEDURE — 99291 CRITICAL CARE FIRST HOUR: CPT | Mod: ,,, | Performed by: EMERGENCY MEDICINE

## 2019-02-11 PROCEDURE — 76705 US LIVER TRANSPLANT POST: ICD-10-PCS | Mod: 26,XS,, | Performed by: RADIOLOGY

## 2019-02-11 PROCEDURE — 93975 VASCULAR STUDY: CPT | Mod: TC

## 2019-02-11 PROCEDURE — 99291 CRITICAL CARE FIRST HOUR: CPT | Mod: 25

## 2019-02-11 PROCEDURE — 96372 THER/PROPH/DIAG INJ SC/IM: CPT | Mod: 59

## 2019-02-11 PROCEDURE — 63600175 PHARM REV CODE 636 W HCPCS: Mod: JG | Performed by: NURSE PRACTITIONER

## 2019-02-11 PROCEDURE — 83615 LACTATE (LD) (LDH) ENZYME: CPT

## 2019-02-11 PROCEDURE — 86850 RBC ANTIBODY SCREEN: CPT

## 2019-02-11 PROCEDURE — 76705 ECHO EXAM OF ABDOMEN: CPT | Mod: 26,XS,, | Performed by: RADIOLOGY

## 2019-02-11 PROCEDURE — 86920 COMPATIBILITY TEST SPIN: CPT

## 2019-02-11 PROCEDURE — 93975 VASCULAR STUDY: CPT | Mod: 26,,, | Performed by: RADIOLOGY

## 2019-02-11 RX ORDER — HYDROCODONE BITARTRATE AND ACETAMINOPHEN 500; 5 MG/1; MG/1
TABLET ORAL
Status: DISCONTINUED | OUTPATIENT
Start: 2019-02-11 | End: 2019-02-11 | Stop reason: HOSPADM

## 2019-02-11 RX ADMIN — TBO-FILGRASTIM 300 MCG: 300 INJECTION, SOLUTION SUBCUTANEOUS at 06:02

## 2019-02-11 RX ADMIN — SODIUM CHLORIDE: 0.9 INJECTION, SOLUTION INTRAVENOUS at 03:02

## 2019-02-11 NOTE — SUBJECTIVE & OBJECTIVE
Past Medical History:   Diagnosis Date    ARF (acute renal failure)     Hyperkalemia     Liver cirrhosis, alcoholic 09/30/2018     Past Surgical History:   Procedure Laterality Date    EGD (ESOPHAGOGASTRODUODENOSCOPY) N/A 12/24/2018    Performed by [facility]ules [facility] at [facility] ENDO (2ND FLR)    EGD (ESOPHAGOGASTRODUODENOSCOPY) N/A 11/6/2018    Performed by Duarte [facility]ules [facility] at [facility] ENDO (2ND FLR)    INSERTION, CATHETER, CENTRAL VENOUS, HEMODIALYSIS, TUNNELED N/A 11/7/2018    Performed by Brock Gonzalez MD at [facility] CATH LAB    LAPAROTOMY, EXPLORATORY N/A 11/16/2018    Performed by Amadou Lester Jr., MD at [facility] OR 2ND FLR    LAPAROTOMY, EXPLORATORY, AFTER LIVER TRANSPLANT N/A 11/16/2018    Performed by Amadou Lester Jr., MD at [facility] OR 2ND FLR    LAPAROTOMY, EXPLORATORY, AFTER LIVER TRANSPLANT, LIKELY  ABDOMINAL CLOSURE N/A 11/18/2018    Performed by Amadou Lester Jr., MD at [facility] OR 2ND FLR    REMOVAL, CATHETER, CENTRAL VENOUS, TUNNELED N/A 2/5/2019    Performed by Brock Gonzalez MD at [facility] CATH LAB    TRANSPLANT, LIVER N/A 11/11/2018    Performed by Shmuel Leary MD at [facility] OR 2ND FLR       Review of patient's allergies indicates:   Allergen Reactions    Penicillins Nausea And Vomiting and Rash     Full body rash from cefepime as well       Family History     None        Tobacco Use    Smoking status: Never Smoker    Smokeless tobacco: Never Used   Substance and Sexual Activity    Alcohol use: No     Frequency: Never     Comment: quit 9/21/18    Drug use: No    Sexual activity: Not on file       Scheduled Meds:  Continuous Infusions:  PRN Meds:    Review of Systems   Constitutional: Negative for activity change, appetite change, chills and fever.   HENT: Negative for nosebleeds.    Respiratory: Negative for chest tightness and shortness of breath.    Cardiovascular: Negative for chest pain.   Gastrointestinal: Negative for blood in stool and nausea.   Allergic/Immunologic:  Positive for immunocompromised state.   Neurological: Negative for dizziness, weakness and light-headedness.      Objective:     Vital Signs (Most Recent):  Temp: 98.8 °F (37.1 °C) (02/11/19 1359)  Pulse: 78 (02/11/19 1359)  Resp: 18 (02/11/19 1359)  BP: 117/75 (02/11/19 1359)  SpO2: 99 % (02/11/19 1359) Vital Signs (24h Range):  Temp:  [97.8 °F (36.6 °C)-98.8 °F (37.1 °C)] 98.8 °F (37.1 °C)  Pulse:  [78] 78  Resp:  [18] 18  SpO2:  [99 %-100 %] 99 %  BP: (117-134)/(75-78) 117/75     Weight: 65.3 kg (144 lb)  Body mass index is 20.66 kg/m².    No intake or output data in the 24 hours ending 02/11/19 1425    Physical Exam   Constitutional: He is oriented to person, place, and time.   HENT:   Head: Normocephalic and atraumatic.   Neck: Normal range of motion.   Cardiovascular: Normal rate, regular rhythm, normal heart sounds and intact distal pulses.   Pulmonary/Chest: Effort normal and breath sounds normal.   Abdominal: Soft. Bowel sounds are normal.   Musculoskeletal: He exhibits edema (2+ ble).   Neurological: He is alert and oriented to person, place, and time.   Skin: Skin is warm and dry. Capillary refill takes less than 2 seconds.   Psychiatric: He has a normal mood and affect. His behavior is normal. Judgment and thought content normal.   Nursing note and vitals reviewed.      Laboratory:  CBC:   Recent Labs   Lab 02/11/19  1332   WBC 2.10*   RBC 2.66*   HGB 7.4*   HCT 24.9*        CMP:   Recent Labs   Lab 02/11/19  0830      CALCIUM 9.5   ALBUMIN 3.1*   PROT 7.6      K 4.5   CO2 22*      BUN 29*   CREATININE 1.2   ALKPHOS 140*   ALT 17   AST 14   BILITOT 0.5       Diagnostic Results:  US Liver Transplant Post:    Results for orders placed during the hospital encounter of 02/11/19   US Liver Transplant Post    Narrative EXAMINATION:  US LIVER TRANSPLANT POST    CLINICAL HISTORY:  s/p liver transplant; Liver transplant status    TECHNIQUE:  Limited abdominal ultrasound of the transplant  liver with Doppler evaluation.  Color and spectral Doppler were performed.    COMPARISON:  Ultrasound liver transplant 01/28/2019.    FINDINGS:  Patient is status post orthotopic liver transplant.  Liver allograft is normal in size.  The liver demonstrates homogeneous echotexture.  No focal hepatic lesions are seen.  No fluid collections.  Small right pleural effusion.  Complex collection posterior to the right hepatic lobe measures 1.7 x 2.1 x 1.9 cm (previously 1.6 x 1.7 x 2.0 cm).  Cyst with thin septation in the right hepatic lobe measures 1.9 by 1.5 x 1.9 cm, similar to prior.  There are echogenic foci in the per hepatis similar to prior, likely postsurgical.    The common duct is not dilated, measuring 6 mm.  No dilated intrahepatic radicles are seen.    Color flow and spectral waveform analysis was performed.  The main portal vein, right portal vein, left portal vein, middle hepatic vein, right hepatic vein, left hepatic vein, and IVC are patent with proper directional flow.    Anastomosis site of the main hepatic artery demonstrates a peak systolic velocity 132 cm/sec.    Main hepatic artery demonstrates resistive index 0.55 with normal waveform.    Left hepatic artery shows resistive index 0.62 with normal waveform.    Anterior branch of the right hepatic artery demonstrates resistive index 0.55 with normal waveform.    Posterior branch of the right hepatic artery demonstrates resistive index 0.55 with normal waveform.      Impression Interval improvement of hepatic arterial resistive indices and decreased peak systolic velocity in the extrahepatic main hepatic artery.  Previously described elevated velocity in the main hepatic artery was most likely due to vessel tortuosity.    Stable small complex peritransplant fluid collection posterior to the right hepatic lobe.    Stable minimally complex cyst at the periphery of the right hepatic lobe.    Trace right pleural effusion.    Electronically signed by  resident: Jay Jay Frazier  Date:    02/11/2019  Time:    10:51    Electronically signed by: Tk Ghotra MD  Date:    02/11/2019  Time:    13:06

## 2019-02-11 NOTE — HPI
Femi Enciso is a 53yr old male with PMH of acute ETOH hepatitis and DEL/ATN evolved to ESRD requiring HD (not candidate for KTX). He is now s/p liver transplant (SM induction, DBD, CMV D+/R-). Transplant surgery noted severe collateralization, adrenal gland firmly adhered to liver. Bare area of adrenal gland required several stitches and packing to obtain fair hemostasis (EBL 15L). OR take back 11/16 for bleeding from R adrenal bed in AM (significant clot in retroperitoneum, posterior to R hepatic lobe, tract posterior to the R kidney containing significant clot, small bleeding area of retroperitoneal fat) then returned to surgery same day for hemorrhaging closed with wound vac with plans to return to OR 24-48 hours for closure (retroperitoneal /retrorenal/retrocolic hematoma on the right ). Raw retroperitoneal bleeding. Suture ligatures placed, argon beam cautery, evarest placed in retroperitoneum behind cava and right kidney. Significant oozing from upper right adrenal gland, not amenable to ligation, that portion of the adrenal gland was resected with cautery). OR take back 11/18 for washout and incisional closure, no significant bleeding or hematoma found.  Now presents to ED with severe anemia found on routine labs.  Denies sob, chest pain, dizziness, headache, or lightheadness.  Does not report melena, hematemesis, or epitaxies.  Vital signs are stable upon assessment.

## 2019-02-11 NOTE — ASSESSMENT & PLAN NOTE
- h/h low on am labs  - repeat 7.4/24.9  - transfuse 1 unit prbc  - repeat am labs as outpatient

## 2019-02-11 NOTE — TELEPHONE ENCOUNTER
Received critical lab value of hemoglobin of 5.2- spoke to pt's mother who states she is unaware if pt is having any dark tarry stools or any other signs of bleeding. Advised pt and mother to report to ER for evaluation and treatment. She verbalizes understanding.

## 2019-02-11 NOTE — ED NOTES
LOC: The patient is awake and alert; oriented x 3 and speaking appropriately.  APPEARANCE: Patient resting comfortably, patient is clean and well groomed, wearing a mask  SKIN: warm and dry, normal skin turgor & moist mucus membranes, skin intact, no breakdown noted.Surgical scar  , no drainage , dry and intact.   MUSCULOSKELETAL: Patient moving all extremities well, no obvious swelling or deformities noted  RESPIRATORY: Airway is open and patent, ; respirations are spontaneous, normal effort and rate  CARDIAC: Patient has a normal rate, no peripheral edema noted, capillary refill < 3 seconds; No complaints of chest pain   ABDOMEN: Soft and non tender to palpation, no distention noted.  Last BM was today,denies blood in stool.

## 2019-02-11 NOTE — ED NOTES
Patient is resting with eyes closed and respirations even and unlabored. Patient does not appear to be in any distress at this time and is easily aroused. Both side rails up, call bell in reach, stretcher in low position.

## 2019-02-11 NOTE — CONSULTS
[facility]  Liver Transplant  Consult Note    Patient Name: Femi Enciso  MRN: [id number]  Admission Date: 2/11/2019  Hospital Length of Stay: 0 days  Code Status: Prior  Attending Provider: Susan Sheffield MD  Primary Care Provider: Primary Doctor No    Inpatient consult to Liver Transplant  Consult performed by: Bev Son NP  Consult ordered by: Susan Sheffield MD        Subjective:     History of Present Illness:  Femi Enciso is a 53yr old male with PMH of acute ETOH hepatitis and DEL/ATN evolved to ESRD requiring HD (not candidate for KTX). He is now s/p liver transplant (SM induction, DBD, CMV D+/R-). Transplant surgery noted severe collateralization, adrenal gland firmly adhered to liver. Bare area of adrenal gland required several stitches and packing to obtain fair hemostasis (EBL 15L). OR take back 11/16 for bleeding from R adrenal bed in AM (significant clot in retroperitoneum, posterior to R hepatic lobe, tract posterior to the R kidney containing significant clot, small bleeding area of retroperitoneal fat) then returned to surgery same day for hemorrhaging closed with wound vac with plans to return to OR 24-48 hours for closure (retroperitoneal /retrorenal/retrocolic hematoma on the right ). Raw retroperitoneal bleeding. Suture ligatures placed, argon beam cautery, evarest placed in retroperitoneum behind cava and right kidney. Significant oozing from upper right adrenal gland, not amenable to ligation, that portion of the adrenal gland was resected with cautery). OR take back 11/18 for washout and incisional closure, no significant bleeding or hematoma found.  Now presents to ED with severe anemia found on routine labs.  Denies sob, chest pain, dizziness, headache, or lightheadness.  Does not report melena, hematemesis, or epitaxies.  Vital signs are stable upon assessment.          Past Medical History:   Diagnosis Date    ARF (acute renal failure)     Hyperkalemia     Liver  cirrhosis, alcoholic 09/30/2018     Past Surgical History:   Procedure Laterality Date    EGD (ESOPHAGOGASTRODUODENOSCOPY) N/A 12/24/2018    Performed by Duarte Jules MD at [facility] ENDO (2ND FLR)    EGD (ESOPHAGOGASTRODUODENOSCOPY) N/A 11/6/2018    Performed by Duarte Jules MD at [facility] ENDO (2ND FLR)    INSERTION, CATHETER, CENTRAL VENOUS, HEMODIALYSIS, TUNNELED N/A 11/7/2018    Performed by Brock Gonzalez MD at [facility] CATH LAB    LAPAROTOMY, EXPLORATORY N/A 11/16/2018    Performed by Amadou Lester Jr., MD at [facility] OR 2ND FLR    LAPAROTOMY, EXPLORATORY, AFTER LIVER TRANSPLANT N/A 11/16/2018    Performed by Amadou Lester Jr., MD at [facility] OR 2ND FLR    LAPAROTOMY, EXPLORATORY, AFTER LIVER TRANSPLANT, LIKELY  ABDOMINAL CLOSURE N/A 11/18/2018    Performed by [facility]ou Lester Jr., MD at [facility] OR [facility] FLR    REMOVAL, CATHETER, CENTRAL VENOUS, TUNNELED N/A 2/5/2019    Performed by Brock Gonzalez MD at [facility] CATH LAB    TRANSPLANT, LIVER N/A 11/11/2018    Performed by Shmuel Leary MD at [facility] OR 2ND FLR       Review of patient's allergies indicates:   Allergen Reactions    Penicillins Nausea And Vomiting and Rash     Full body rash from cefepime as well       Family History     None        Tobacco Use    Smoking status: Never Smoker    Smokeless tobacco: Never Used   Substance and Sexual Activity    Alcohol use: No     Frequency: Never     Comment: quit 9/21/18    Drug use: No    Sexual activity: Not on file       Scheduled Meds:  Continuous Infusions:  PRN Meds:    Review of Systems   Constitutional: Negative for activity change, appetite change, chills and fever.   HENT: Negative for nosebleeds.    Respiratory: Negative for chest tightness and shortness of breath.    Cardiovascular: Negative for chest pain.   Gastrointestinal: Negative for blood in stool and nausea.   Allergic/Immunologic: Positive for immunocompromised state.   Neurological: Negative for dizziness, weakness and  light-headedness.      Objective:     Vital Signs (Most Recent):  Temp: 98.8 °F (37.1 °C) (02/11/19 1359)  Pulse: 78 (02/11/19 1359)  Resp: 18 (02/11/19 1359)  BP: 117/75 (02/11/19 1359)  SpO2: 99 % (02/11/19 1359) Vital Signs (24h Range):  Temp:  [97.8 °F (36.6 °C)-98.8 °F (37.1 °C)] 98.8 °F (37.1 °C)  Pulse:  [78] 78  Resp:  [18] 18  SpO2:  [99 %-100 %] 99 %  BP: (117-134)/(75-78) 117/75     Weight: 65.3 kg (144 lb)  Body mass index is 20.66 kg/m².    No intake or output data in the 24 hours ending 02/11/19 1425    Physical Exam   Constitutional: He is oriented to person, place, and time.   HENT:   Head: Normocephalic and atraumatic.   Neck: Normal range of motion.   Cardiovascular: Normal rate, regular rhythm, normal heart sounds and intact distal pulses.   Pulmonary/Chest: Effort normal and breath sounds normal.   Abdominal: Soft. Bowel sounds are normal.   Musculoskeletal: He exhibits edema (2+ ble).   Neurological: He is alert and oriented to person, place, and time.   Skin: Skin is warm and dry. Capillary refill takes less than 2 seconds.   Psychiatric: He has a normal mood and affect. His behavior is normal. Judgment and thought content normal.   Nursing note and vitals reviewed.      Laboratory:  CBC:   Recent Labs   Lab 02/11/19  1332   WBC 2.10*   RBC 2.66*   HGB 7.4*   HCT 24.9*        CMP:   Recent Labs   Lab 02/11/19  0830      CALCIUM 9.5   ALBUMIN 3.1*   PROT 7.6      K 4.5   CO2 22*      BUN 29*   CREATININE 1.2   ALKPHOS 140*   ALT 17   AST 14   BILITOT 0.5       Diagnostic Results:  US Liver Transplant Post:    Results for orders placed during the hospital encounter of 02/11/19   US Liver Transplant Post    Narrative EXAMINATION:  US LIVER TRANSPLANT POST    CLINICAL HISTORY:  s/p liver transplant; Liver transplant status    TECHNIQUE:  Limited abdominal ultrasound of the transplant liver with Doppler evaluation.  Color and spectral Doppler were  performed.    COMPARISON:  Ultrasound liver transplant 01/28/2019.    FINDINGS:  Patient is status post orthotopic liver transplant.  Liver allograft is normal in size.  The liver demonstrates homogeneous echotexture.  No focal hepatic lesions are seen.  No fluid collections.  Small right pleural effusion.  Complex collection posterior to the right hepatic lobe measures 1.7 x 2.1 x 1.9 cm (previously 1.6 x 1.7 x 2.0 cm).  Cyst with thin septation in the right hepatic lobe measures 1.9 by 1.5 x 1.9 cm, similar to prior.  There are echogenic foci in the per hepatis similar to prior, likely postsurgical.    The common duct is not dilated, measuring 6 mm.  No dilated intrahepatic radicles are seen.    Color flow and spectral waveform analysis was performed.  The main portal vein, right portal vein, left portal vein, middle hepatic vein, right hepatic vein, left hepatic vein, and IVC are patent with proper directional flow.    Anastomosis site of the main hepatic artery demonstrates a peak systolic velocity 132 cm/sec.    Main hepatic artery demonstrates resistive index 0.55 with normal waveform.    Left hepatic artery shows resistive index 0.62 with normal waveform.    Anterior branch of the right hepatic artery demonstrates resistive index 0.55 with normal waveform.    Posterior branch of the right hepatic artery demonstrates resistive index 0.55 with normal waveform.      Impression Interval improvement of hepatic arterial resistive indices and decreased peak systolic velocity in the extrahepatic main hepatic artery.  Previously described elevated velocity in the main hepatic artery was most likely due to vessel tortuosity.    Stable small complex peritransplant fluid collection posterior to the right hepatic lobe.    Stable minimally complex cyst at the periphery of the right hepatic lobe.    Trace right pleural effusion.    Electronically signed by resident: Jay Jay  Freddie  Date:    02/11/2019  Time:    10:51    Electronically signed by: Tk Ghotra MD  Date:    02/11/2019  Time:    13:06     Assessment/Plan:     * Anemia of chronic disease    - h/h low on am labs  - repeat 7.4/24.9  - transfuse 1 unit prbc  - repeat am labs as outpatient       Neutropenia, unspecified    - given neupogen  -        Prophylactic immunotherapy    - continue prograf  - monitor prograf levels and adjust for therapeutic dose       S/P liver transplant    - s/p oltx 11/11/2018 (Liver) 2/2 etoh cirrhosis  - lfts stable         Acidosis    - continue bicarb replacement           - plan to transfuse 1 unti prbc then d/c from ED  - repeat labs in am as outpatient  - give neupogen for neutropenia  - plan discussed with Dr. Cliff Son, NP  Liver Transplant  [facility]

## 2019-02-11 NOTE — ED PROVIDER NOTES
Encounter Date: 2/11/2019    SCRIBE #1 NOTE: I, Jaylon Jules , am scribing for, and in the presence of,  Susan Sheffield MD. I have scribed the entire note.       History     Chief Complaint   Patient presents with    Abnormal Lab     liver xplant in nov, had lab drawn today, needing blood transfusion, mask applied     Mr. Enciso is a 53 year old male with past medical history of liver transplant on November 11, 2018, acute renal failure, hyperkalemia, and liver cirrhosis presents to the Emergency Department from the transplant clinic for evaluation of an abnormal lab result. States that his hemoglobin level was down to 5.2, and that it is the 3rd time since his transplant that is has dropped. Endorses sharp abdominal pain ranked at 7/10 3 days ago that has tapered off somewhat since then, and constipation.  Pain is scattered, generalized, and last for several seconds and resolved spontaneously.  Denies any shortness of breath, loose stools,weakness, fever, chills, tremor, or any other complaint at this time.       The history is provided by the patient and medical records.     Review of patient's allergies indicates:   Allergen Reactions    Penicillins Nausea And Vomiting and Rash     Full body rash from cefepime as well     Past Medical History:   Diagnosis Date    ARF (acute renal failure)     Hyperkalemia     Liver cirrhosis, alcoholic 09/30/2018     Past Surgical History:   Procedure Laterality Date    EGD (ESOPHAGOGASTRODUODENOSCOPY) N/A 12/24/2018    Performed by [facility]ules MD at [facility] ENDO (2ND FLR)    EGD (ESOPHAGOGASTRODUODENOSCOPY) N/A 11/6/2018    Performed by Duarte Jules MD at [facility] ENDO (2ND FLR)    INSERTION, CATHETER, CENTRAL VENOUS, HEMODIALYSIS, TUNNELED N/A 11/7/2018    Performed by Brock Gonzalez MD at [facility] CATH LAB    LAPAROTOMY, EXPLORATORY N/A 11/16/2018    Performed by Amadou Lester Jr., MD at [facility] OR 2ND FLR    LAPAROTOMY, EXPLORATORY, AFTER LIVER TRANSPLANT N/A  11/16/2018    Performed by Am[facility]ou Lester Jr., MD at [facility] OR 2ND FLR    LAPAROTOMY, EXPLORATORY, AFTER LIVER TRANSPLANT, LIKELY  ABDOMINAL CLOSURE N/A 11/18/2018    Performed by Amadou Lester Jr., MD at [facility] OR 2ND FLR    REMOVAL, CATHETER, CENTRAL VENOUS, TUNNELED N/A 2/5/2019    Performed by Brock Gonzalez MD at [facility] CATH LAB    TRANSPLANT, LIVER N/A 11/11/2018    Performed by Shmuel Leary MD at [facility] OR 2ND FLR     History reviewed. No pertinent family history.  Social History     Tobacco Use    Smoking status: Never Smoker    Smokeless tobacco: Never Used   Substance Use Topics    Alcohol use: No     Frequency: Never     Comment: quit 9/21/18    Drug use: No     Review of Systems   Constitutional: Negative for fever.   HENT: Negative for nosebleeds.    Eyes: Negative for visual disturbance.   Respiratory: Negative for shortness of breath.    Cardiovascular: Negative for chest pain.   Gastrointestinal: Positive for abdominal pain and constipation. Negative for blood in stool and diarrhea.   Genitourinary: Positive for hematuria.   Musculoskeletal: Negative for back pain.   Skin: Negative for rash.   Neurological: Negative for tremors.       Physical Exam     Initial Vitals [02/11/19 1153]   BP Pulse Resp Temp SpO2   134/78 78 18 97.8 °F (36.6 °C) 100 %      MAP       --         Physical Exam    Nursing note and vitals reviewed.  Constitutional: He appears well-developed and well-nourished. He is not diaphoretic. He appears ill (chronically). No distress.   HENT:   Head: Normocephalic and atraumatic.   Mouth/Throat: Oropharynx is clear and moist.   Eyes: Pupils are equal, round, and reactive to light.   + mild conjunctival pallor    Neck: Normal range of motion. Neck supple. No JVD present.   Cardiovascular: Normal rate, regular rhythm, normal heart sounds and intact distal pulses.   Pulmonary/Chest: Breath sounds normal. No respiratory distress. He has no wheezes. He has no rhonchi. He  has no rales.   Abdominal: Soft. He exhibits no distension. There is no tenderness.   + abdominal scar consistent with transplant    Musculoskeletal: Normal range of motion. He exhibits no edema.   no peripheral edema    Lymphadenopathy:     He has no cervical adenopathy.   Neurological: He is alert and oriented to person, place, and time. He has normal strength. No cranial nerve deficit or sensory deficit.   Skin: Skin is warm and dry.         ED Course   Procedures  Labs Reviewed   CBC W/ AUTO DIFFERENTIAL   TYPE & SCREEN     EKG Readings: (Independently Interpreted)   EKG:  Normal sinus 79, normal intervals, normal axis, no ischemic changes       Imaging Results    None          Medical Decision Making:   History:   Old Medical Records: I decided to obtain old medical records.  Old Records Summarized: records from previous admission(s).       <> Summary of Records: Patient seen today in clinic, labs drawn and reviewed with hemoglobin of 5.2 ( baseline 7.7).  Patient advised to come to the ER for further evaluation and treatment.  Initial Assessment:   Emergent evaluation a 53-year-old male status post liver transplant 11/11/2019 here today with abnormal labs.  He is well-appearing in no acute distress.  Vital signs are stable.  He denies black stools, BRBPR, or hematemesis.  Differential Diagnosis:   Symptomatic anemia, anemia requiring transfusion, acute blood loss  Independently Interpreted Test(s):   I have ordered and independently interpreted EKG Reading(s) - see prior notes  Clinical Tests:   Lab Tests: Ordered and Reviewed  Medical Tests: Ordered and Reviewed  ED Management:  1:23 pm   Case discussed with liver transplant. They will admit for transfusion. Blood consent obtained, and they will place orders.     2:30 PM  Repeat CBC reviewed, hemoglobin 7.4.  Result from earlier likely lab error.  Case was discussed with liver transplant, they are recommending transfusion 1 unit PRBCs and discharge. Patient  updated plan.  He expressed verbal understanding.    5:40 PM  Transfusion completed with no complications.  Patient will be discharged to follow up for a.m. labs.            Scribe Attestation:   Scribe #1: I performed the above scribed service and the documentation accurately describes the services I performed. I attest to the accuracy of the note.    Attending Attestation:         Attending Critical Care:   Critical Care Times:   ==============================================================  · Total Critical Care Time - exclusive of procedural time: 35 minutes.  ==============================================================  Critical care reasons: anemia requring transfusion, Transplant pt.     Physician Attestation for Scribe:      Comments: I, Dr. Susan Sheffield, personally performed the services described in this documentation. All medical record entries made by the scribe were at my direction and in my presence.  I have reviewed the chart and agree that the record reflects my personal performance and is accurate and complete. Susan Sheffield MD.                 Clinical Impression:   The primary encounter diagnosis was Anemia requiring transfusions. A diagnosis of Anemia was also pertinent to this visit.      Disposition:   Disposition: Discharged  Condition: Fair                        Susan Sheffield MD  02/11/19 1340       Susan Sheffield MD  02/11/19 1446       Susan Sheffield MD  02/11/19 1743       Susan Sheffield MD  02/11/19 1743

## 2019-02-11 NOTE — DISCHARGE INSTRUCTIONS
- follow up tomorrow for repeat labs    Our goal in the emergency department is to always give you outstanding care and exceptional service. You may receive a survey by mail or e-mail in the next week regarding your experience in our ED. We would greatly appreciate your completing and returning the survey. Your feedback provides us with a way to recognize our staff who give very good care and it helps us learn how to improve when your experience was below our aspiration of excellence.

## 2019-02-11 NOTE — ED NOTES
Referred to ER for low H and H drawn at 830am today- 5.2/16.8.  Recent liver transplant 11/11/2018 here. Denies blood in stool .Era falls. Feels  Weak only after PT.

## 2019-02-12 ENCOUNTER — TELEPHONE (OUTPATIENT)
Dept: TRANSPLANT | Facility: CLINIC | Age: 54
End: 2019-02-12

## 2019-02-13 ENCOUNTER — TELEPHONE (OUTPATIENT)
Dept: TRANSPLANT | Facility: CLINIC | Age: 54
End: 2019-02-13

## 2019-02-13 ENCOUNTER — LAB VISIT (OUTPATIENT)
Dept: LAB | Facility: HOSPITAL | Age: 54
End: 2019-02-13
Attending: INTERNAL MEDICINE
Payer: COMMERCIAL

## 2019-02-13 DIAGNOSIS — Z48.23 ENCOUNTER FOR AFTERCARE FOLLOWING LIVER TRANSPLANT: Primary | ICD-10-CM

## 2019-02-13 DIAGNOSIS — Z94.4 LIVER REPLACED BY TRANSPLANT: Primary | ICD-10-CM

## 2019-02-13 LAB
ALBUMIN SERPL BCP-MCNC: 2.9 G/DL
ALP SERPL-CCNC: 127 U/L
ALT SERPL W/O P-5'-P-CCNC: 17 U/L
ANION GAP SERPL CALC-SCNC: 7 MMOL/L
ANISOCYTOSIS BLD QL SMEAR: SLIGHT
AST SERPL-CCNC: 17 U/L
BASOPHILS # BLD AUTO: ABNORMAL K/UL
BASOPHILS NFR BLD: 11 %
BILIRUB SERPL-MCNC: 0.6 MG/DL
BUN SERPL-MCNC: 24 MG/DL
CALCIUM SERPL-MCNC: 9.6 MG/DL
CHLORIDE SERPL-SCNC: 110 MMOL/L
CO2 SERPL-SCNC: 24 MMOL/L
CREAT SERPL-MCNC: 1.2 MG/DL
DIFFERENTIAL METHOD: ABNORMAL
EOSINOPHIL # BLD AUTO: ABNORMAL K/UL
EOSINOPHIL NFR BLD: 0 %
ERYTHROCYTE [DISTWIDTH] IN BLOOD BY AUTOMATED COUNT: 16.3 %
EST. GFR  (AFRICAN AMERICAN): >60 ML/MIN/1.73 M^2
EST. GFR  (NON AFRICAN AMERICAN): >60 ML/MIN/1.73 M^2
GLUCOSE SERPL-MCNC: 88 MG/DL
HCT VFR BLD AUTO: 29.2 %
HGB BLD-MCNC: 9 G/DL
HYPOCHROMIA BLD QL SMEAR: ABNORMAL
IMM GRANULOCYTES # BLD AUTO: ABNORMAL K/UL
IMM GRANULOCYTES NFR BLD AUTO: ABNORMAL %
LYMPHOCYTES # BLD AUTO: ABNORMAL K/UL
LYMPHOCYTES NFR BLD: 48 %
MCH RBC QN AUTO: 28.6 PG
MCHC RBC AUTO-ENTMCNC: 30.8 G/DL
MCV RBC AUTO: 93 FL
MONOCYTES # BLD AUTO: ABNORMAL K/UL
MONOCYTES NFR BLD: 1 %
MYELOCYTES NFR BLD MANUAL: 1 %
NEUTROPHILS NFR BLD: 38 %
NEUTS BAND NFR BLD MANUAL: 1 %
NRBC BLD-RTO: 0 /100 WBC
OVALOCYTES BLD QL SMEAR: ABNORMAL
PLATELET # BLD AUTO: 295 K/UL
PMV BLD AUTO: 10.5 FL
POIKILOCYTOSIS BLD QL SMEAR: SLIGHT
POLYCHROMASIA BLD QL SMEAR: ABNORMAL
POTASSIUM SERPL-SCNC: 4.7 MMOL/L
PROT SERPL-MCNC: 7 G/DL
RBC # BLD AUTO: 3.15 M/UL
SODIUM SERPL-SCNC: 141 MMOL/L
TACROLIMUS BLD-MCNC: 5.3 NG/ML
WBC # BLD AUTO: 1.57 K/UL

## 2019-02-13 PROCEDURE — 80197 ASSAY OF TACROLIMUS: CPT

## 2019-02-13 PROCEDURE — 85027 COMPLETE CBC AUTOMATED: CPT

## 2019-02-13 PROCEDURE — 80053 COMPREHEN METABOLIC PANEL: CPT

## 2019-02-13 PROCEDURE — 85007 BL SMEAR W/DIFF WBC COUNT: CPT

## 2019-02-13 NOTE — TELEPHONE ENCOUNTER
Informed pt labs reviewed with Dr. Oconnell. Wbc is again low, KSF=774. MD states for pt to hold cellcept and Valcyte. Will schedule another injection of Neupogen for pt tomorrow in transplant clinic. Pt to repeat labs Friday prior to clinic visit. He verbalizes understanding of all information.

## 2019-02-14 ENCOUNTER — CLINICAL SUPPORT (OUTPATIENT)
Dept: TRANSPLANT | Facility: CLINIC | Age: 54
End: 2019-02-14
Payer: COMMERCIAL

## 2019-02-14 VITALS
BODY MASS INDEX: 21.02 KG/M2 | SYSTOLIC BLOOD PRESSURE: 116 MMHG | HEIGHT: 70 IN | DIASTOLIC BLOOD PRESSURE: 79 MMHG | HEART RATE: 77 BPM | OXYGEN SATURATION: 100 % | WEIGHT: 146.81 LBS | RESPIRATION RATE: 18 BRPM | TEMPERATURE: 98 F

## 2019-02-14 DIAGNOSIS — Z94.4 S/P LIVER TRANSPLANT: Primary | ICD-10-CM

## 2019-02-14 PROCEDURE — 99999 PR PBB SHADOW E&M-EST. PATIENT-LVL III: CPT | Mod: PBBFAC,,,

## 2019-02-14 PROCEDURE — 96372 THER/PROPH/DIAG INJ SC/IM: CPT | Mod: S$GLB,,, | Performed by: INTERNAL MEDICINE

## 2019-02-14 PROCEDURE — 96372 PR INJECTION,THERAP/PROPH/DIAG2ST, IM OR SUBCUT: ICD-10-PCS | Mod: S$GLB,,, | Performed by: INTERNAL MEDICINE

## 2019-02-14 PROCEDURE — 99999 PR PBB SHADOW E&M-EST. PATIENT-LVL III: ICD-10-PCS | Mod: PBBFAC,,,

## 2019-02-14 RX ORDER — TACROLIMUS 1 MG/1
3 CAPSULE ORAL EVERY 12 HOURS
Qty: 180 CAPSULE | Refills: 11 | Status: CANCELLED | OUTPATIENT
Start: 2019-02-14

## 2019-02-14 NOTE — PROGRESS NOTES
Neupogen 480 mcg given per MD order to right posterior arm. Patient tolerated with no complaints. Patient instructed on possible side effects of this medication including pain. Verbalized understanding.

## 2019-02-15 ENCOUNTER — TELEPHONE (OUTPATIENT)
Dept: TRANSPLANT | Facility: CLINIC | Age: 54
End: 2019-02-15

## 2019-02-15 ENCOUNTER — OFFICE VISIT (OUTPATIENT)
Dept: TRANSPLANT | Facility: CLINIC | Age: 54
End: 2019-02-15
Payer: COMMERCIAL

## 2019-02-15 ENCOUNTER — LAB VISIT (OUTPATIENT)
Dept: LAB | Facility: HOSPITAL | Age: 54
End: 2019-02-15
Attending: INTERNAL MEDICINE
Payer: COMMERCIAL

## 2019-02-15 VITALS
RESPIRATION RATE: 18 BRPM | OXYGEN SATURATION: 100 % | HEIGHT: 70 IN | HEART RATE: 74 BPM | SYSTOLIC BLOOD PRESSURE: 136 MMHG | TEMPERATURE: 98 F | WEIGHT: 148.56 LBS | DIASTOLIC BLOOD PRESSURE: 78 MMHG | BODY MASS INDEX: 21.27 KG/M2

## 2019-02-15 DIAGNOSIS — Z94.4 LIVER REPLACED BY TRANSPLANT: ICD-10-CM

## 2019-02-15 DIAGNOSIS — Z74.09 IMPAIRED FUNCTIONAL MOBILITY AND ENDURANCE: ICD-10-CM

## 2019-02-15 DIAGNOSIS — E87.5 HYPERKALEMIA: ICD-10-CM

## 2019-02-15 DIAGNOSIS — D70.2 OTHER DRUG-INDUCED NEUTROPENIA: ICD-10-CM

## 2019-02-15 DIAGNOSIS — Z79.60 LONG-TERM USE OF IMMUNOSUPPRESSANT MEDICATION: Primary | ICD-10-CM

## 2019-02-15 DIAGNOSIS — Z94.4 S/P LIVER TRANSPLANT: Primary | ICD-10-CM

## 2019-02-15 DIAGNOSIS — Z29.89 PROPHYLACTIC IMMUNOTHERAPY: ICD-10-CM

## 2019-02-15 DIAGNOSIS — D63.8 ANEMIA OF CHRONIC DISEASE: ICD-10-CM

## 2019-02-15 DIAGNOSIS — R53.1 WEAKNESS: ICD-10-CM

## 2019-02-15 LAB
ALBUMIN SERPL BCP-MCNC: 3.1 G/DL
ALP SERPL-CCNC: 124 U/L
ALT SERPL W/O P-5'-P-CCNC: 14 U/L
ANION GAP SERPL CALC-SCNC: 5 MMOL/L
AST SERPL-CCNC: 19 U/L
BASOPHILS NFR BLD: 3 %
BILIRUB SERPL-MCNC: 0.7 MG/DL
BLD PROD TYP BPU: NORMAL
BLOOD UNIT EXPIRATION DATE: NORMAL
BLOOD UNIT TYPE CODE: 5100
BLOOD UNIT TYPE: NORMAL
BUN SERPL-MCNC: 24 MG/DL
CALCIUM SERPL-MCNC: 9.7 MG/DL
CHLORIDE SERPL-SCNC: 108 MMOL/L
CO2 SERPL-SCNC: 24 MMOL/L
CODING SYSTEM: NORMAL
CREAT SERPL-MCNC: 1.3 MG/DL
DIFFERENTIAL METHOD: ABNORMAL
DISPENSE STATUS: NORMAL
EOSINOPHIL NFR BLD: 6 %
ERYTHROCYTE [DISTWIDTH] IN BLOOD BY AUTOMATED COUNT: 16.2 %
EST. GFR  (AFRICAN AMERICAN): >60 ML/MIN/1.73 M^2
EST. GFR  (NON AFRICAN AMERICAN): >60 ML/MIN/1.73 M^2
GLUCOSE SERPL-MCNC: 90 MG/DL
HCT VFR BLD AUTO: 30.5 %
HGB BLD-MCNC: 9.5 G/DL
IMM GRANULOCYTES # BLD AUTO: ABNORMAL K/UL
IMM GRANULOCYTES NFR BLD AUTO: ABNORMAL %
LYMPHOCYTES NFR BLD: 43 %
MCH RBC QN AUTO: 29.2 PG
MCHC RBC AUTO-ENTMCNC: 31.1 G/DL
MCV RBC AUTO: 94 FL
MONOCYTES NFR BLD: 12 %
NEUTROPHILS NFR BLD: 36 %
NRBC BLD-RTO: 0 /100 WBC
PLATELET # BLD AUTO: 263 K/UL
PLATELET BLD QL SMEAR: ABNORMAL
PMV BLD AUTO: 10.2 FL
POTASSIUM SERPL-SCNC: 5 MMOL/L
PROT SERPL-MCNC: 7.2 G/DL
RBC # BLD AUTO: 3.25 M/UL
SODIUM SERPL-SCNC: 137 MMOL/L
TACROLIMUS BLD-MCNC: 4.7 NG/ML
TOXIC GRANULES BLD QL SMEAR: PRESENT
TRANS ERYTHROCYTES VOL PATIENT: NORMAL ML
WBC # BLD AUTO: 1.88 K/UL

## 2019-02-15 PROCEDURE — 3008F PR BODY MASS INDEX (BMI) DOCUMENTED: ICD-10-PCS | Mod: CPTII,S$GLB,, | Performed by: NURSE PRACTITIONER

## 2019-02-15 PROCEDURE — 99999 PR PBB SHADOW E&M-EST. PATIENT-LVL IV: ICD-10-PCS | Mod: PBBFAC,,, | Performed by: NURSE PRACTITIONER

## 2019-02-15 PROCEDURE — 3078F PR MOST RECENT DIASTOLIC BLOOD PRESSURE < 80 MM HG: ICD-10-PCS | Mod: CPTII,S$GLB,, | Performed by: NURSE PRACTITIONER

## 2019-02-15 PROCEDURE — 3075F PR MOST RECENT SYSTOLIC BLOOD PRESS GE 130-139MM HG: ICD-10-PCS | Mod: CPTII,S$GLB,, | Performed by: NURSE PRACTITIONER

## 2019-02-15 PROCEDURE — 85027 COMPLETE CBC AUTOMATED: CPT

## 2019-02-15 PROCEDURE — 36415 COLL VENOUS BLD VENIPUNCTURE: CPT

## 2019-02-15 PROCEDURE — 99214 PR OFFICE/OUTPT VISIT, EST, LEVL IV, 30-39 MIN: ICD-10-PCS | Mod: S$GLB,,, | Performed by: NURSE PRACTITIONER

## 2019-02-15 PROCEDURE — 3078F DIAST BP <80 MM HG: CPT | Mod: CPTII,S$GLB,, | Performed by: NURSE PRACTITIONER

## 2019-02-15 PROCEDURE — 85007 BL SMEAR W/DIFF WBC COUNT: CPT

## 2019-02-15 PROCEDURE — 3075F SYST BP GE 130 - 139MM HG: CPT | Mod: CPTII,S$GLB,, | Performed by: NURSE PRACTITIONER

## 2019-02-15 PROCEDURE — 80197 ASSAY OF TACROLIMUS: CPT

## 2019-02-15 PROCEDURE — 80053 COMPREHEN METABOLIC PANEL: CPT

## 2019-02-15 PROCEDURE — 99214 OFFICE O/P EST MOD 30 MIN: CPT | Mod: S$GLB,,, | Performed by: NURSE PRACTITIONER

## 2019-02-15 PROCEDURE — 3008F BODY MASS INDEX DOCD: CPT | Mod: CPTII,S$GLB,, | Performed by: NURSE PRACTITIONER

## 2019-02-15 PROCEDURE — 99999 PR PBB SHADOW E&M-EST. PATIENT-LVL IV: CPT | Mod: PBBFAC,,, | Performed by: NURSE PRACTITIONER

## 2019-02-15 NOTE — PROGRESS NOTES
Transplant Hepatology  Liver Transplant Recipient Follow-up    Transplant Date: 2018  UNOS Native Liver Dx: Alcoholic Cirrhosis    Femi is here for follow up of his liver transplant.    ORGAN: LIVER  Whole or Partial: whole liver  Donor Type:  - brain death  CDC High Risk: no  Donor CMV Status: Positive  Donor HCV Status: Negative  Donor HBcAb: Negative  Donor HBV JAVIER: Negative  Donor HCV JAVIER: Negative  Biliary Anastomosis: end to end  Arterial Anatomy: standard  IVC reconstruction: end to end ivc  Portal vein status: patent    He has had the following complications since transplant:   -- OR take back  for bleeding; OR take back  for washout and incisional closure  -- OR cultures (+) VRE with significant leukocytosis (required percutaneous drain of intraperitoneal fluid)  -- Persistent pleural effusion requiring thoracentesis   -- AMS / delirium throughout hospital stay (73 days inpatient)   -- Acute renal failure requiring HD   -- Dysphagia and poor appetite (required tube feeds inpatient)   -- Ultrasound findings suggestive of hepatic artery stenosis (currently on ASA)   -- Other issues post transplant: recurrent anemia and neutropenia, hyperkalemia (on veltassa)      Subjective:     Interval History: Femi was last seen on 2/3/19 by Dr. Sharma. He is now 98 days s/p liver transplant. Despite complex post-transplant course, he is doing well. Current complaints include chronic back pain. Getting home health/PT and weakness/mobility improving. Appetite is great (on marinol). Is ready to return to [place]. Notes that he will not have his apartment after the first week in March. Plans to establish care with liver center in [place].     He was recently in the ED () with anemia. Hgb initially 5.2, though 7.4 on repeat. S/p 1 unit PRBC and H/H improved to 9/. Denies any noticeable bleeding.     Labs also significant for neutropenia.  on . Received neupogen yesterday. Cellcept  "and valcyte being held.       US (2/11/19):   - Interval improvement of hepatic arterial resistive indices and decreased peak systolic velocity in the extrahepatic main hepatic artery.  Previously described elevated velocity in the main hepatic artery was most likely due to vessel tortuosity.  - Stable small complex peritransplant fluid collection posterior to the right hepatic lobe.    Current immunosuppression:   - Tacrolimus: 3 mg BID. Last prograf level 4.7.                                    - MMF: on hold                                                                                  Graft Function: excellent; LFTs normal     Renal function stable, Cr 1.2-1.3       Review of Systems   GENERAL: Denies fever, chills, weight loss/gain, fatigue  HEENT: Denies headaches, dizziness, vision/hearing changes  CARDIOVASCULAR: Denies chest pain, palpitations, or edema  RESPIRATORY: Denies dyspnea, cough  GI: Denies abdominal pain, rectal bleeding, nausea, vomiting. No change in bowel pattern or color  : Denies dysuria, hematuria   SKIN: Denies rash, itching   NEURO: Denies confusion, memory loss, or mood changes  PSYCH: Denies depression or anxiety  HEME/LYMPH: Denies easy bruising or bleeding  MSK: (+) back pain        Objective:     Physical Exam   Friendly White male, in no acute distress; alert and oriented to person, place and time  VITALS: /78 (BP Location: Left arm, Patient Position: Sitting, BP Method: Medium (Automatic))   Pulse 74   Temp 97.6 °F (36.4 °C) (Oral)   Resp 18   Ht 5' 10" (1.778 m)   Wt 67.4 kg (148 lb 9.4 oz)   SpO2 100%   BMI 21.32 kg/m²   HENT: Normocephalic, without obvious abnormality. Oral mucosa pink and moist. Dentition good.  EYES: Sclerae anicteric. No conjunctival pallor.   NECK: Supple. No masses or cervical adenopathy.  CARDIOVASCULAR: Regular rate and rhythm. No murmurs.  RESPIRATORY: Normal respiratory effort. BBS CTA. No wheezes or crackles.  GI: Soft, non-tender, " non-distended. No hepatosplenomegaly. No masses palpable. No ascites. Chevron incision CDI, healing, no hernias or hematomas appreciated   EXTREMITIES:  No clubbing, cyanosis or edema.  SKIN: Warm and dry. No jaundice. No rashes noted to exposed skin. No telangectasias noted. No palmar erythema.  NEURO:  No asterixis. In wheelchair though able to walk and get on exam table without assistance   PSYCH:  Memory intact. Thought and speech pattern appropriate. Behavior normal. No depression or anxiety noted.        Lab Results   Component Value Date    BILITOT 0.7 02/15/2019    AST 19 02/15/2019    ALT 14 02/15/2019    ALKPHOS 124 02/15/2019    CREATININE 1.3 02/15/2019    ALBUMIN 3.1 (L) 02/15/2019     Lab Results   Component Value Date    WBC 1.88 (LL) 02/15/2019    HGB 9.5 (L) 02/15/2019    HCT 30.5 (L) 02/15/2019    HCT 24 (L) 11/16/2018     02/15/2019     Lab Results   Component Value Date    TACROLIMUS 5.3 02/13/2019       Assessment/Plan:   1. S/P liver transplant due to: alcoholic cirrhosis  -- Normal LFTs. Continue monitoring symptoms, labs, and drug levels for drug-related toxicity and side effects.  -- Continue with post transplant labs per protocol    2. Prophylactic immunotherapy  -- Current IS: tacrolimus 3 mg BID. Last prograf level 4.7  -- Continue holding cellcept and valcyte for neutropenia   -- Per staff recommendations, plan to start everolimus 0.5 mg BID (renal sparing strategy since MMF on hold). Will have labs to check level next week.     3. Pancytopenia   -- Continue holding cellcept and valcyte    4. Hyperkalemia  -- K 5.0  -- Continue veltassa  -- Low K diet reviewed     5. Physical deconditioning, weakness   -- Continue HH / PT    6. Renal insufficiency  -- Creatinine stable, 1.2-1.3  -- Hydration encouraged                           .     Transplant Health Maintenance:  -- Instructed to f/u with PCP for regular health maintenance care including cancer (colonoscopy) and bone mineral  density screenings   -- Reviewed need to avoid sun exposure with use of sunblock, hats, long sleeves related to increased risk of skin cancers. Recommend f/u with Dermatology for annual skin checks        Patient is eager to return home to [place]. Dr. Oconnell will see patient in clinic 2/26 to reassess and clarify f/u plan.        Yahaira Michael NP    Hepatology and Liver Transplant        UNOS Patient Status  Functional Status: 50% - Requires considerable assistance and frequent medical care  Physical Capacity: Limited Mobility

## 2019-02-15 NOTE — TELEPHONE ENCOUNTER
Informed pt labs reviewed by Dr. Sharma, will start everolimus at 0.5 mg BID. Asked pt to notify office when he receives medication so labs can be scheduled. Hepatology f/u visit scheduled with Dr. Oconnell 2/26. Pt verbalizes understanding.

## 2019-02-17 RX ORDER — EVEROLIMUS 0.5 MG/1
0.5 TABLET ORAL 2 TIMES DAILY
Qty: 60 TABLET | Refills: 11 | Status: SHIPPED | OUTPATIENT
Start: 2019-02-17 | End: 2019-03-01 | Stop reason: DRUGHIGH

## 2019-02-18 LAB — CMV DNA SERPL NAA+PROBE-ACNC: NORMAL IU/ML

## 2019-02-21 ENCOUNTER — LAB VISIT (OUTPATIENT)
Dept: LAB | Facility: OTHER | Age: 54
End: 2019-02-21
Payer: COMMERCIAL

## 2019-02-21 DIAGNOSIS — I12.9 PARENCHYMAL RENAL HYPERTENSION: Primary | ICD-10-CM

## 2019-02-21 DIAGNOSIS — I12.9 PARENCHYMAL RENAL HYPERTENSION: ICD-10-CM

## 2019-02-21 LAB
ALBUMIN SERPL BCP-MCNC: 2.9 G/DL
ALP SERPL-CCNC: 131 U/L
ALT SERPL W/O P-5'-P-CCNC: 17 U/L
ANION GAP SERPL CALC-SCNC: 10 MMOL/L
ANISOCYTOSIS BLD QL SMEAR: SLIGHT
AST SERPL-CCNC: 19 U/L
BASOPHILS # BLD AUTO: ABNORMAL K/UL
BASOPHILS NFR BLD: 3 %
BILIRUB SERPL-MCNC: 0.5 MG/DL
BUN SERPL-MCNC: 22 MG/DL
CALCIUM SERPL-MCNC: 9.1 MG/DL
CHLORIDE SERPL-SCNC: 106 MMOL/L
CO2 SERPL-SCNC: 24 MMOL/L
CREAT SERPL-MCNC: 1 MG/DL
DIFFERENTIAL METHOD: ABNORMAL
EOSINOPHIL # BLD AUTO: ABNORMAL K/UL
EOSINOPHIL NFR BLD: 2 %
ERYTHROCYTE [DISTWIDTH] IN BLOOD BY AUTOMATED COUNT: 15.8 %
EST. GFR  (AFRICAN AMERICAN): >60 ML/MIN/1.73 M^2
EST. GFR  (NON AFRICAN AMERICAN): >60 ML/MIN/1.73 M^2
GLUCOSE SERPL-MCNC: 87 MG/DL
HCT VFR BLD AUTO: 30.3 %
HGB BLD-MCNC: 9.7 G/DL
HYPOCHROMIA BLD QL SMEAR: ABNORMAL
LYMPHOCYTES # BLD AUTO: ABNORMAL K/UL
LYMPHOCYTES NFR BLD: 30 %
MCH RBC QN AUTO: 28.7 PG
MCHC RBC AUTO-ENTMCNC: 32 G/DL
MCV RBC AUTO: 90 FL
MONOCYTES # BLD AUTO: ABNORMAL K/UL
MONOCYTES NFR BLD: 36 %
NEUTROPHILS NFR BLD: 28 %
NEUTS BAND NFR BLD MANUAL: 1 %
PLATELET # BLD AUTO: 217 K/UL
PLATELET BLD QL SMEAR: ABNORMAL
PMV BLD AUTO: 10.6 FL
POIKILOCYTOSIS BLD QL SMEAR: SLIGHT
POLYCHROMASIA BLD QL SMEAR: ABNORMAL
POTASSIUM SERPL-SCNC: 4 MMOL/L
PROT SERPL-MCNC: 7.1 G/DL
RBC # BLD AUTO: 3.38 M/UL
SODIUM SERPL-SCNC: 140 MMOL/L
SPHEROCYTES BLD QL SMEAR: ABNORMAL
WBC # BLD AUTO: 5.21 K/UL

## 2019-02-21 PROCEDURE — 80053 COMPREHEN METABOLIC PANEL: CPT

## 2019-02-21 PROCEDURE — 85007 BL SMEAR W/DIFF WBC COUNT: CPT

## 2019-02-21 PROCEDURE — 80197 ASSAY OF TACROLIMUS: CPT

## 2019-02-21 PROCEDURE — 85027 COMPLETE CBC AUTOMATED: CPT

## 2019-02-21 PROCEDURE — 36415 COLL VENOUS BLD VENIPUNCTURE: CPT

## 2019-02-21 PROCEDURE — 80169 DRUG ASSAY EVEROLIMUS: CPT

## 2019-02-22 LAB
EVEROLIMUS BLD-MCNC: 1.2 NG/ML
TACROLIMUS BLD-MCNC: 5.4 NG/ML

## 2019-02-26 ENCOUNTER — TELEPHONE (OUTPATIENT)
Dept: PHARMACY | Facility: CLINIC | Age: 54
End: 2019-02-26

## 2019-02-26 ENCOUNTER — OFFICE VISIT (OUTPATIENT)
Dept: TRANSPLANT | Facility: CLINIC | Age: 54
End: 2019-02-26
Payer: COMMERCIAL

## 2019-02-26 ENCOUNTER — RESEARCH ENCOUNTER (OUTPATIENT)
Dept: RESEARCH | Facility: HOSPITAL | Age: 54
End: 2019-02-26

## 2019-02-26 VITALS
HEIGHT: 70 IN | SYSTOLIC BLOOD PRESSURE: 143 MMHG | TEMPERATURE: 98 F | DIASTOLIC BLOOD PRESSURE: 81 MMHG | RESPIRATION RATE: 18 BRPM | OXYGEN SATURATION: 100 % | WEIGHT: 148.13 LBS | BODY MASS INDEX: 21.21 KG/M2 | HEART RATE: 79 BPM

## 2019-02-26 DIAGNOSIS — Z29.89 PROPHYLACTIC IMMUNOTHERAPY: ICD-10-CM

## 2019-02-26 DIAGNOSIS — T86.49 STENOSIS OF HEPATIC ARTERY OF TRANSPLANTED LIVER: Primary | ICD-10-CM

## 2019-02-26 DIAGNOSIS — F10.21 ALCOHOL USE DISORDER, SEVERE, IN EARLY REMISSION, DEPENDENCE: ICD-10-CM

## 2019-02-26 DIAGNOSIS — Z79.60 LONG-TERM USE OF IMMUNOSUPPRESSANT MEDICATION: ICD-10-CM

## 2019-02-26 DIAGNOSIS — I77.1 STENOSIS OF HEPATIC ARTERY OF TRANSPLANTED LIVER: Primary | ICD-10-CM

## 2019-02-26 DIAGNOSIS — Z94.4 S/P LIVER TRANSPLANT: ICD-10-CM

## 2019-02-26 PROCEDURE — 3079F DIAST BP 80-89 MM HG: CPT | Mod: CPTII,S$GLB,, | Performed by: INTERNAL MEDICINE

## 2019-02-26 PROCEDURE — 3008F PR BODY MASS INDEX (BMI) DOCUMENTED: ICD-10-PCS | Mod: CPTII,S$GLB,, | Performed by: INTERNAL MEDICINE

## 2019-02-26 PROCEDURE — 3079F PR MOST RECENT DIASTOLIC BLOOD PRESSURE 80-89 MM HG: ICD-10-PCS | Mod: CPTII,S$GLB,, | Performed by: INTERNAL MEDICINE

## 2019-02-26 PROCEDURE — 99999 PR PBB SHADOW E&M-EST. PATIENT-LVL III: CPT | Mod: PBBFAC,,, | Performed by: INTERNAL MEDICINE

## 2019-02-26 PROCEDURE — 99215 PR OFFICE/OUTPT VISIT, EST, LEVL V, 40-54 MIN: ICD-10-PCS | Mod: S$GLB,,, | Performed by: INTERNAL MEDICINE

## 2019-02-26 PROCEDURE — 3077F PR MOST RECENT SYSTOLIC BLOOD PRESSURE >= 140 MM HG: ICD-10-PCS | Mod: CPTII,S$GLB,, | Performed by: INTERNAL MEDICINE

## 2019-02-26 PROCEDURE — 3008F BODY MASS INDEX DOCD: CPT | Mod: CPTII,S$GLB,, | Performed by: INTERNAL MEDICINE

## 2019-02-26 PROCEDURE — 99215 OFFICE O/P EST HI 40 MIN: CPT | Mod: S$GLB,,, | Performed by: INTERNAL MEDICINE

## 2019-02-26 PROCEDURE — 99999 PR PBB SHADOW E&M-EST. PATIENT-LVL III: ICD-10-PCS | Mod: PBBFAC,,, | Performed by: INTERNAL MEDICINE

## 2019-02-26 PROCEDURE — 3077F SYST BP >= 140 MM HG: CPT | Mod: CPTII,S$GLB,, | Performed by: INTERNAL MEDICINE

## 2019-02-26 NOTE — PROGRESS NOTES
Subjective:       Patient ID: Femi Enciso is a 53 y.o. male.    Chief Complaint: Liver Transplant Follow-up    HPI  I saw this 53 y.o. man who had a  donor liver transplant for alcoholic liver disease in 2018. He is now almost 3.5 months post op.  He had kidney failure post op and needed HD.    He also had some bleeding around the right adrenal and required laparotomy at which time his wound was left open and was taken back 2 days later for closure.  He had episodes of sepsis followed by poor appetitite but he eventually recovered and went to rehab.  His length of stay was 73 days.    He is currently on Tac and everolimus and has normal LFTs and creatinine- off HD.    Post-LT Complications  Acute cellular rejection:                       no  Antibody-mediated rejection:               no  Biliary anastomotic stricture:               no  HAT:                                                     no  HA stenosis:                                        no  PV stenosis:                                        no  CMV reactivation:                                 no  PTLD:                                                   no  Other malignancies:                             no    Immunosuppressions  Tacrolimus:                                          yes  MMF:                                                    yes  Prednisone:                                         No (induction completed)  Cyclosporine:                                       no  Sirolimus:                                             yes  Everolimus:                                          no     Abdo US: 2019  Interval improvement of hepatic arterial resistive indices and decreased peak systolic velocity in the extrahepatic main hepatic artery.  Previously described elevated velocity in the main hepatic artery was most likely due to vessel tortuosity.  Stable small complex peritransplant fluid collection posterior to the right hepatic  lobe.  Stable minimally complex cyst at the periphery of the right hepatic lobe.  Trace right pleural effusion.    ORGAN: liver DIAGNOSIS: alcohol MELD:40  SEROLOGY Recipient/Donor : O/O CMV: -/ + HCV: -/ - HBcAb: - /-   INDUCTION: STEROIDS   ANASTOMOSIS: CDD   PHS high risk: NO     EXPLANT:negative for malignancy, ETOH cirrhosis  HCC: NO  Explant discussed: YES  -----------------------------------------------------------------  IMMUNOSUPPRESSION: Tac, Everolimus  PCP PROPHYLAXIS: Atovaquone ( hyperkalemia) daily until 5/10/19  CMV PROPHYLAXIS: Valcyte daily until 5/10/19 ( on hold for low wbc)   Aspirin: YES DOSE: 81 mg   ------------------------------------------------------------------  Ultrasound:   DBD :Month 3,6,12- due Feb '19- appt made      Review of Systems   Constitutional: Negative for activity change, appetite change, chills, fatigue, fever and unexpected weight change.   HENT: Negative for hearing loss.    Eyes: Negative for discharge and visual disturbance.   Respiratory: Negative for cough, chest tightness, shortness of breath and wheezing.    Cardiovascular: Negative for chest pain, palpitations and leg swelling.   Gastrointestinal: Negative for abdominal distention, abdominal pain, constipation, diarrhea and nausea.   Genitourinary: Negative for dysuria and frequency.   Musculoskeletal: Negative for arthralgias and back pain.   Skin: Negative for pallor and rash.   Neurological: Negative for dizziness, tremors, speech difficulty and headaches.   Hematological: Negative for adenopathy.   Psychiatric/Behavioral: Negative for agitation and confusion.           Lab Results   Component Value Date    ALT 21 03/04/2019    AST 22 03/04/2019     (H) 11/26/2018    ALKPHOS 138 (H) 03/04/2019    BILITOT 0.5 03/04/2019     Past Medical History:   Diagnosis Date    ARF (acute renal failure)     Hyperkalemia     Liver cirrhosis, alcoholic 09/30/2018     Past Surgical History:   Procedure Laterality Date     EGD (ESOPHAGOGASTRODUODENOSCOPY) N/A 12/24/2018    Performed by Duarte Jules MD at [facility] ENDO (2ND FLR)    EGD (ESOPHAGOGASTRODUODENOSCOPY) N/A 11/6/2018    Performed by Duarte Jules MD at [facility] ENDO (2ND FLR)    INSERTION, CATHETER, CENTRAL VENOUS, HEMODIALYSIS, TUNNELED N/A 11/7/2018    Performed by Brock Gonzalez MD at [facility] CATH LAB    LAPAROTOMY, EXPLORATORY N/A 11/16/2018    Performed by Amadou Lester Jr., MD at [facility] OR 2ND FLR    LAPAROTOMY, EXPLORATORY, AFTER LIVER TRANSPLANT N/A 11/16/2018    Performed by Amadou Lester Jr., MD at [facility] OR 2ND FLR    LAPAROTOMY, EXPLORATORY, AFTER LIVER TRANSPLANT, LIKELY  ABDOMINAL CLOSURE N/A 11/18/2018    Performed by Amadou Lester Jr., MD at [facility] OR 2ND FLR    REMOVAL, CATHETER, CENTRAL VENOUS, TUNNELED N/A 2/5/2019    Performed by Brock Gonzalez MD at [facility] CATH LAB    TRANSPLANT, LIVER N/A 11/11/2018    Performed by Shmeul Leary MD at [facility] OR 2ND FLR     Current Outpatient Medications   Medication Sig    acetaminophen (TYLENOL) 325 MG tablet Take 2 tablets (650 mg total) by mouth every 6 (six) hours as needed.    amLODIPine (NORVASC) 5 MG tablet Take 1 tablet (5 mg total) by mouth once daily.    aspirin 81 MG Chew Take 1 tablet (81 mg total) by mouth once daily.    atovaquone (MEPRON) 750 mg/5 mL Susp Take 10 mLs (1,500 mg total) by mouth once daily. STOP 5/10/19    ergocalciferol (ERGOCALCIFEROL) 50,000 unit Cap Take 1 capsule (50,000 Units total) by mouth every 7 days.    escitalopram oxalate (LEXAPRO) 20 MG tablet Take 1 tablet (20 mg total) by mouth once daily.    gabapentin (NEURONTIN) 300 MG capsule Take 1 capsule (300 mg total) by mouth 3 (three) times daily.    magnesium oxide (MAG-OX) 400 mg (241.3 mg magnesium) tablet Take 2 tablets (800 mg total) by mouth 2 (two) times daily.    metoprolol tartrate (LOPRESSOR) 25 MG tablet Take 0.5 tablets (12.5 mg total) by mouth 2 (two) times daily.    multivitamin  (THERAGRAN) per tablet Take 1 tablet by mouth once daily.    pantoprazole (PROTONIX) 40 MG tablet Take 1 tablet (40 mg total) by mouth 2 (two) times daily.    patiromer calcium sorbitex (VELTASSA) 16.8 gram PwPk Mix and take 1 packet (16.8 g total) by mouth once daily.    tacrolimus (PROGRAF) 1 MG Cap Take 3 capsules (3 mg total) by mouth every 12 (twelve) hours.    everolimus, immunosuppressive, (ZORTRESS) 0.5 mg tablet Take 2 tablets (1 mg total) by mouth 2 (two) times daily.    everolimus, immunosuppressive, (ZORTRESS) 0.5 mg tablet Take 2 tablets (1 mg total) by mouth 2 (two) times daily.     No current facility-administered medications for this visit.        Objective:      Physical Exam   Constitutional: He is oriented to person, place, and time. He appears well-nourished.   HENT:   Head: Normocephalic.   Eyes: Pupils are equal, round, and reactive to light.   Neck: No thyromegaly present.   Cardiovascular: Normal rate, regular rhythm and normal heart sounds.   Pulmonary/Chest: Effort normal and breath sounds normal. He has no wheezes.   Abdominal: Soft. He exhibits no distension and no mass. There is no tenderness.   Lymphadenopathy:     He has no cervical adenopathy.   Neurological: He is alert and oriented to person, place, and time.   Skin: Skin is warm. No rash noted. No erythema.   Psychiatric: He has a normal mood and affect. His behavior is normal.       Assessment:       1. 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 Prophylactic immunotherapy    3. Long-term use of immunosuppressant medication    4. Alcohol use disorder, severe, in early remission, dependence    5. S/P liver transplant        Plan:   No medication changes  Will need to stay in [place] for at least another week to make sure his labs are okay  Has arranged follow up with Nikolai Vera Patient Status  Functional Status: 100% - Normal, no complaints, no evidence of disease  Physical Capacity: No  Limitations

## 2019-02-26 NOTE — PROGRESS NOTES
Pt and wife were approached in Liver Transplant clinic regarding participation in IRB protocol #2015.101.C, [facility] 1805 study. Pt was agreeable.     The Informed Consent Form (ICF) was reviewed with pt. The discussion included:   - participation is voluntary  - pt can change his mind about participating  - pt was informed that participation in this study would not preclude him from participating in any other research if offered  - specimens may be used by [facility] researchers, community researchers or research companies  - specimens collected include only those discussed with the patient at the time of consent and are indicated on the ICF   - specimens may be used for DNA, RNA or protein studies investigating biomarkers for diagnostic, prognostic or treatment purposes  - blood specimen will be collected from lab after routine collections have been conducted  - participation in Biobank study will not change any treatment  - all specimens released to researchers will be stripped of identifiers  - no personal medical information will be released to any parties outside of this research study  - there will be no other physical risks outside of those involved in standard of care procedure     Dr. Oconnell approved of patient's participation in the Biobank study. Pt stated his labs are primarily drawn via home health. Pt did not have any other questions. Pt willingly and independently signed ICF.    A copy of signed ICF was given to pt with instructions to call with any questions that may arise or if he should change his mind regarding participation in Biobank study.

## 2019-02-26 NOTE — LETTER
March 8, 2019        Ting Perez  [address] [facility] [address]  Phone: [phone]  Fax: [phone]             [facility] - Liver Transplant  [address]  [facility] [address]  Phone: [phone]   Patient: Femi Enciso   MR Number: [id number]   YOB: 1965   Date of Visit: 2/26/2019       Dear Dr. Ting Perez    Thank you for referring Femi Enciso to me for evaluation. Attached you will find relevant portions of my assessment and plan of care.    If you have questions, please do not hesitate to call me. I look forward to following Femi Enciso along with you.    Sincerely,    Hal Oconnell MD    Enclosure    If you would like to receive this communication electronically, please contact [email or web address] or [phone] to request Acceptd Link access.    Acceptd Link is a tool which provides read-only access to select patient information with whom you have a relationship. Its easy to use and provides real time access to review your patients record including encounter summaries, notes, results, and demographic information.    If you feel you have received this communication in error or would no longer like to receive these types of communications, please e-mail [email or web address]

## 2019-02-27 ENCOUNTER — TELEPHONE (OUTPATIENT)
Dept: PHARMACY | Facility: CLINIC | Age: 54
End: 2019-02-27

## 2019-02-27 ENCOUNTER — PATIENT MESSAGE (OUTPATIENT)
Dept: TRANSPLANT | Facility: CLINIC | Age: 54
End: 2019-02-27

## 2019-02-27 NOTE — TELEPHONE ENCOUNTER
Veltassa refill confirmed. We will ship Veltassa refill on  via fedex to arrive on . $25.00 copay- 004. Confirmed 2 patient identifiers - name and .     Patient has 6 doses of Veltassa remaining and takes it around lunchtime daily.  Pt reports they are not having any side effects so far. No missed doses, no new medications, no new allergies or health conditions reported at this time.. All questions answered and addressed to patients satisfaction. Pt verbalized understanding.

## 2019-02-28 ENCOUNTER — PATIENT MESSAGE (OUTPATIENT)
Dept: TRANSPLANT | Facility: CLINIC | Age: 54
End: 2019-02-28

## 2019-02-28 DIAGNOSIS — Z79.60 LONG-TERM USE OF IMMUNOSUPPRESSANT MEDICATION: ICD-10-CM

## 2019-02-28 NOTE — TELEPHONE ENCOUNTER
Informed pt labs reviewed with Dr. Sharma, instructed to increase everolimus to 1 mg BID. Will repeat labs Monday 3/4.

## 2019-02-28 NOTE — TELEPHONE ENCOUNTER
----- Message from Heidi Sharma MD sent at 2/28/2019 11:13 AM CST -----  Labs stable, everolimus: 1.2, not therapeutic yet, FK: 5.4 acceptable. Please increase Zortress to 1 mg BID, no change for tacro. Please repeat labs in 1 week.

## 2019-03-04 ENCOUNTER — LAB VISIT (OUTPATIENT)
Dept: LAB | Facility: HOSPITAL | Age: 54
End: 2019-03-04
Attending: INTERNAL MEDICINE
Payer: COMMERCIAL

## 2019-03-04 DIAGNOSIS — Z94.4 LIVER REPLACED BY TRANSPLANT: Primary | ICD-10-CM

## 2019-03-04 LAB
ALBUMIN SERPL BCP-MCNC: 3 G/DL
ALP SERPL-CCNC: 138 U/L
ALT SERPL W/O P-5'-P-CCNC: 21 U/L
ANION GAP SERPL CALC-SCNC: 8 MMOL/L
AST SERPL-CCNC: 22 U/L
BASOPHILS # BLD AUTO: 0.14 K/UL
BASOPHILS NFR BLD: 2.8 %
BILIRUB SERPL-MCNC: 0.5 MG/DL
BUN SERPL-MCNC: 24 MG/DL
CALCIUM SERPL-MCNC: 9.7 MG/DL
CHLORIDE SERPL-SCNC: 105 MMOL/L
CHOLEST SERPL-MCNC: 199 MG/DL
CHOLEST/HDLC SERPL: 4.3 {RATIO}
CO2 SERPL-SCNC: 25 MMOL/L
CREAT SERPL-MCNC: 1 MG/DL
CRP SERPL-MCNC: 30.5 MG/L
DIFFERENTIAL METHOD: ABNORMAL
EOSINOPHIL # BLD AUTO: 0.3 K/UL
EOSINOPHIL NFR BLD: 6.2 %
ERYTHROCYTE [DISTWIDTH] IN BLOOD BY AUTOMATED COUNT: 14.5 %
ERYTHROCYTE [SEDIMENTATION RATE] IN BLOOD BY WESTERGREN METHOD: 70 MM/HR
EST. GFR  (AFRICAN AMERICAN): >60 ML/MIN/1.73 M^2
EST. GFR  (NON AFRICAN AMERICAN): >60 ML/MIN/1.73 M^2
GLUCOSE SERPL-MCNC: 99 MG/DL
HCT VFR BLD AUTO: 29.9 %
HDLC SERPL-MCNC: 46 MG/DL
HDLC SERPL: 23.1 %
HGB BLD-MCNC: 9.4 G/DL
IMM GRANULOCYTES # BLD AUTO: 0.15 K/UL
IMM GRANULOCYTES NFR BLD AUTO: 3 %
LDLC SERPL CALC-MCNC: 126.6 MG/DL
LYMPHOCYTES # BLD AUTO: 1.2 K/UL
LYMPHOCYTES NFR BLD: 23.9 %
MAGNESIUM SERPL-MCNC: 1.5 MG/DL
MCH RBC QN AUTO: 28.2 PG
MCHC RBC AUTO-ENTMCNC: 31.4 G/DL
MCV RBC AUTO: 90 FL
MONOCYTES # BLD AUTO: 1.1 K/UL
MONOCYTES NFR BLD: 22.5 %
NEUTROPHILS # BLD AUTO: 2.1 K/UL
NEUTROPHILS NFR BLD: 41.6 %
NONHDLC SERPL-MCNC: 153 MG/DL
NRBC BLD-RTO: 0 /100 WBC
PLATELET # BLD AUTO: 266 K/UL
PMV BLD AUTO: 11 FL
POTASSIUM SERPL-SCNC: 4.8 MMOL/L
PROT SERPL-MCNC: 7.5 G/DL
RBC # BLD AUTO: 3.33 M/UL
SODIUM SERPL-SCNC: 138 MMOL/L
TACROLIMUS BLD-MCNC: 4.5 NG/ML
TRIGL SERPL-MCNC: 132 MG/DL
WBC # BLD AUTO: 4.97 K/UL

## 2019-03-04 PROCEDURE — 80061 LIPID PANEL: CPT

## 2019-03-04 PROCEDURE — 85025 COMPLETE CBC W/AUTO DIFF WBC: CPT

## 2019-03-04 PROCEDURE — 85652 RBC SED RATE AUTOMATED: CPT

## 2019-03-04 PROCEDURE — 80169 DRUG ASSAY EVEROLIMUS: CPT

## 2019-03-04 PROCEDURE — 80197 ASSAY OF TACROLIMUS: CPT

## 2019-03-04 PROCEDURE — 83735 ASSAY OF MAGNESIUM: CPT

## 2019-03-04 PROCEDURE — 36415 COLL VENOUS BLD VENIPUNCTURE: CPT

## 2019-03-04 PROCEDURE — 86140 C-REACTIVE PROTEIN: CPT

## 2019-03-04 PROCEDURE — 80053 COMPREHEN METABOLIC PANEL: CPT

## 2019-03-04 RX ORDER — EVEROLIMUS 0.5 MG/1
1 TABLET ORAL 2 TIMES DAILY
Qty: 120 TABLET | Refills: 5 | Status: SHIPPED | OUTPATIENT
Start: 2019-03-04 | End: 2019-03-18

## 2019-03-05 LAB
CMV DNA SERPL NAA+PROBE-ACNC: NORMAL IU/ML
EVEROLIMUS BLD-MCNC: 2 NG/ML

## 2019-03-06 ENCOUNTER — TELEPHONE (OUTPATIENT)
Dept: TRANSPLANT | Facility: CLINIC | Age: 54
End: 2019-03-06

## 2019-03-06 NOTE — TELEPHONE ENCOUNTER
Spoke to pt and informed him labs reviewed with Dr. Sharma. Pt to increase everolimus to 1 mg BID. Dr. Sharma also states for pt ok to return home to [facility] with family. Pt has appointment with local transplant hepatologist Dr. Navarro on 3/14 at [facility]. Will fax transplant records. Pt states he has refilled all medication and has adequate supply to last until local hepatology appt.

## 2019-03-06 NOTE — TELEPHONE ENCOUNTER
----- Message from Heidi Sharma MD sent at 3/5/2019 11:49 PM CST -----  Labs stable, please increase everolimus dose to 1 mg BID and continue same dose of tacrolimus. Needs to continue magnesium supplements.

## 2019-03-10 PROBLEM — E66.3 OVERWEIGHT (BMI 25.0-29.9): Status: RESOLVED | Noted: 2018-11-13 | Resolved: 2019-03-10

## 2019-03-10 PROBLEM — Z74.09 IMPAIRED FUNCTIONAL MOBILITY AND ENDURANCE: Status: RESOLVED | Noted: 2018-12-27 | Resolved: 2019-03-10

## 2019-03-10 PROBLEM — E83.42 HYPOMAGNESEMIA: Status: RESOLVED | Noted: 2018-12-16 | Resolved: 2019-03-10

## 2019-03-18 DIAGNOSIS — Z94.4 STATUS POST LIVER TRANSPLANT: ICD-10-CM

## 2019-03-18 DIAGNOSIS — Z79.60 LONG-TERM USE OF IMMUNOSUPPRESSANT MEDICATION: ICD-10-CM

## 2019-03-18 RX ORDER — ATOVAQUONE 750 MG/5ML
1500 SUSPENSION ORAL DAILY
Qty: 300 ML | Refills: 1 | Status: SHIPPED | OUTPATIENT
Start: 2019-03-18 | End: 2019-05-10

## 2019-03-18 RX ORDER — TACROLIMUS 1 MG/1
3 CAPSULE ORAL EVERY 12 HOURS
Qty: 180 CAPSULE | Refills: 11 | Status: SHIPPED | OUTPATIENT
Start: 2019-03-18

## 2019-03-19 DIAGNOSIS — Z94.9 HYPERTENSION ASSOCIATED WITH TRANSPLANTATION: ICD-10-CM

## 2019-03-19 DIAGNOSIS — Z94.4 STATUS POST LIVER TRANSPLANT: ICD-10-CM

## 2019-03-19 DIAGNOSIS — I15.8 HYPERTENSION ASSOCIATED WITH TRANSPLANTATION: ICD-10-CM

## 2019-03-19 RX ORDER — ERGOCALCIFEROL 1.25 MG/1
50000 CAPSULE ORAL
Qty: 4 CAPSULE | Refills: 4 | Status: SHIPPED | OUTPATIENT
Start: 2019-03-19

## 2019-03-19 RX ORDER — PANTOPRAZOLE SODIUM 40 MG/1
40 TABLET, DELAYED RELEASE ORAL 2 TIMES DAILY
Qty: 60 TABLET | Refills: 11 | Status: SHIPPED | OUTPATIENT
Start: 2019-03-19

## 2019-03-19 RX ORDER — EVEROLIMUS 0.5 MG/1
1 TABLET ORAL 2 TIMES DAILY
Qty: 120 TABLET | Refills: 11 | Status: SHIPPED | OUTPATIENT
Start: 2019-03-19

## 2019-03-19 RX ORDER — AMLODIPINE BESYLATE 5 MG/1
5 TABLET ORAL DAILY
Qty: 30 TABLET | Refills: 11 | Status: SHIPPED | OUTPATIENT
Start: 2019-03-19 | End: 2020-03-18

## 2019-03-19 RX ORDER — LANOLIN ALCOHOL/MO/W.PET/CERES
800 CREAM (GRAM) TOPICAL 2 TIMES DAILY
Qty: 120 TABLET | Refills: 11 | Status: SHIPPED | OUTPATIENT
Start: 2019-03-19

## 2019-03-19 RX ORDER — GABAPENTIN 300 MG/1
300 CAPSULE ORAL 3 TIMES DAILY
Qty: 90 CAPSULE | Refills: 5 | Status: SHIPPED | OUTPATIENT
Start: 2019-03-19

## 2019-03-19 RX ORDER — METOPROLOL TARTRATE 25 MG/1
12.5 TABLET, FILM COATED ORAL 2 TIMES DAILY
Qty: 30 TABLET | Refills: 11 | Status: SHIPPED | OUTPATIENT
Start: 2019-03-19 | End: 2019-10-24 | Stop reason: SDUPTHER

## 2019-03-19 RX ORDER — ESCITALOPRAM OXALATE 20 MG/1
20 TABLET ORAL DAILY
Qty: 30 TABLET | Refills: 5 | Status: SHIPPED | OUTPATIENT
Start: 2019-03-19

## 2019-03-22 ENCOUNTER — PATIENT MESSAGE (OUTPATIENT)
Dept: TRANSPLANT | Facility: CLINIC | Age: 54
End: 2019-03-22

## 2019-03-24 ENCOUNTER — PATIENT MESSAGE (OUTPATIENT)
Dept: TRANSPLANT | Facility: CLINIC | Age: 54
End: 2019-03-24

## 2019-03-29 ENCOUNTER — PATIENT MESSAGE (OUTPATIENT)
Dept: TRANSPLANT | Facility: CLINIC | Age: 54
End: 2019-03-29

## 2019-04-03 LAB
EVEROLIMUS: 2.4
EXT ALBUMIN: 3.8
EXT ALKALINE PHOSPHATASE: 156
EXT ALT: 63
EXT AST: 32
EXT BILIRUBIN TOTAL: 0.6
EXT BUN: 32
EXT CALCIUM: 9.4
EXT CHLORIDE: 101
EXT CO2: 24
EXT CREATININE: 1.25 MG/DL
EXT EOSINOPHIL%: 2.7
EXT GLUCOSE: 155
EXT HEMATOCRIT: 28.4
EXT HEMOGLOBIN: 9.7
EXT LYMPH%: 54.1
EXT MONOCYTES%: 7.4
EXT PLATELETS: 224
EXT POTASSIUM: 4.5
EXT PROTEIN TOTAL: 7.5
EXT SEGS%: 35.1
EXT SODIUM: 134 MMOL/L
EXT TACROLIMUS LVL: 5.7
EXT WBC: 10.4

## 2019-04-05 ENCOUNTER — PATIENT MESSAGE (OUTPATIENT)
Dept: TRANSPLANT | Facility: CLINIC | Age: 54
End: 2019-04-05

## 2019-04-08 ENCOUNTER — TELEPHONE (OUTPATIENT)
Dept: TRANSPLANT | Facility: CLINIC | Age: 54
End: 2019-04-08

## 2019-04-08 NOTE — TELEPHONE ENCOUNTER
----- Message from Heidi Sharma MD sent at 4/5/2019  9:37 AM CDT -----  Ok, if they are following his labs per their protocol which should be very similar, it is fine.    ----- Message -----  From: Alissa Bolivar RN  Sent: 4/5/2019   9:07 AM  To: Heidi Sharma MD    Yes that is the case , the office confirmed with me they were following I think he was just worried bc it had been a month since he had labs.   ----- Message -----  From: Heidi Sharma MD  Sent: 4/4/2019   5:02 PM  To: Alissa Bolivar RN    Ok, she actually did not see this patient prior to his transfer to [facility]. He was directly transferred from [place], [place] in fact his wife/daughter flew him here.    Well, we can still do labs by our protocol. I thought he would be under the care of their coordinators as a usual post patient.    Heidi  ----- Message -----  From: Alissa Bolivar RN  Sent: 4/4/2019   3:12 PM  To: Heidi Sharma MD    FYI- pt had labs done because he was concerned about his initial visit with Dr. Navarro she was not rechecking labs for almost a month. He is scheduled to be seen again 4/19.

## 2019-04-08 NOTE — TELEPHONE ENCOUNTER
Informed pt labs reviewed and are stable, pt who was seen by transplant hepatologist Dr. Navarro will be transferring care to her office for all post transplant care.

## 2019-04-10 ENCOUNTER — PATIENT MESSAGE (OUTPATIENT)
Dept: TRANSPLANT | Facility: CLINIC | Age: 54
End: 2019-04-10

## 2019-04-11 ENCOUNTER — TELEPHONE (OUTPATIENT)
Dept: TRANSPLANT | Facility: CLINIC | Age: 54
End: 2019-04-11

## 2019-04-11 NOTE — TELEPHONE ENCOUNTER
Confirmed with patient he has been seen by Dr. Navarro at [facility] and is now under her care for post transplant monitoring. Will change pt's status.

## 2019-04-11 NOTE — TELEPHONE ENCOUNTER
Patient now being seen at [facility]  Dona Soto, MSN, RNBC    Liver Transplant Coordinator

## 2019-04-11 NOTE — TELEPHONE ENCOUNTER
----- Message from Heidi Sharma MD sent at 2/15/2019  1:41 PM CST -----  Labs stable, tacrolimus level low, patient is not on MMF due to cytopenia. Would start on everolimus (Zortress) 0.5 mg BID for renal sparing strategy and check level next week.

## 2019-10-24 DIAGNOSIS — I10 HYPERTENSION, UNSPECIFIED TYPE: Primary | ICD-10-CM

## 2019-10-24 RX ORDER — METOPROLOL TARTRATE 25 MG/1
TABLET, FILM COATED ORAL
Qty: 60 TABLET | Refills: 0 | Status: SHIPPED | OUTPATIENT
Start: 2019-10-24

## 2019-12-07 RX ORDER — GABAPENTIN 300 MG/1
CAPSULE ORAL
Qty: 90 CAPSULE | Refills: 1 | OUTPATIENT
Start: 2019-12-07

## 2020-02-06 DIAGNOSIS — Z94.4 STATUS POST LIVER TRANSPLANT: ICD-10-CM

## 2020-02-06 RX ORDER — PATIROMER 16.8 G/1
POWDER, FOR SUSPENSION ORAL
Qty: 30 PACKET | Refills: 1 | Status: SHIPPED | OUTPATIENT
Start: 2020-02-06

## 2020-03-01 NOTE — ASSESSMENT & PLAN NOTE
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   - s/p OHLTx 11/11/2018   - remains anuric   - Had intra-op SLED  - CVP 6 this am    Plan:  - Will cont SLED for metabolic clearance, will continue and reassess in am   - Hemodynamically improved off pressors  - -350 ml/hr as tolerated will adjust once he settles post op  - Remains anuric  - Strict I/O and chart  - Avoid nephrotoxic medications  - please keep Hb > 7 gm/dL or higher if symptomatic  - Medication doses adjusted to GFR     Yes

## 2020-08-06 NOTE — ASSESSMENT & PLAN NOTE
Avoid insulin stacking and hypoglycemia.  Lab Results   Component Value Date    CREATININE 6.4 (H) 11/10/2018          External FHR/External [place]

## 2021-01-21 NOTE — PT/OT/SLP PROGRESS
Occupational Therapy   Treatment    Name: Femi Enciso  MRN: [id number]  Admitting Diagnosis:  S/P liver transplant  19 Days Post-Op    Recommendations:     Discharge Recommendations: nursing facility, skilled  Discharge Equipment Recommendations:  (TBD)  Barriers to discharge:  None    Subjective     Pain/Comfort:  · Pain Rating 1: 0/10  · Pain Rating Post-Intervention 1: 0/10    Objective:     Communicated with: RN prior to session.  Patient found with all lines intact, call button in reach and RN notified and telemetry, pulse ox (continuous) upon OT entry to room.    General Precautions: Standard, fall, contact   Orthopedic Precautions:N/A   Braces: N/A     Occupational Performance:    Bed Mobility:    · Patient completed Rolling/Turning to Right with contact guard assistance  · Patient completed Scooting while seated EOB with CGA ( forward to get both feet flat on floor)  · Patient completed Supine to Sit with moderate assistance     Functional Mobility/Transfers:  · Patient completed Sit <> Stand Transfer with mod A -max A x2  with  hand-held assist   --- Pt completed x5 trials sit <> stand ( 2x from EOB, 3x from bedside chair)  -- 1st trial from EOB requiring moderate assistance x2; Four following trials requiring maximum assistance x2  --- 5th sit<> stand trial pt completed weighting shifting through R/L LE requiring max A x2  · Patient completed Bed <> Chair Transfer using Stand Pivot technique with maximal assistance and of 2 persons with no assistive device ( pt unable to maintain full standing position)  · Functional Mobility: NT, 2/2 fair standing balance and tolerance    Activities of Daily Living:  · Feeding:  stand by assistance with set- up assistance to drink from cup while seated EOB  · Upper Body Dressing: moderate assistance to milady gown like jacket while seated EOB  · Lower Body Dressing: maximal assistance to milady B socks while supine    [facility] 6 Click ADL: 15    Treatment & Education:  -Pt edu on  OT role/POC  -Importance of OOB activity with staff assistance ( sitting EOB/in chair during each meal)  -Safety during functional t/f and mobility   - White board updated  - Multiple self care tasks completed-- assistance level noted above  - All questions/concerns answered within OT scope of practice  - Provided emotional encouragement throughout session ( pt appears anxious at time and with fair self confidence)    Patient left up in chair with all lines intact, call button in reach and RN notified  Education:    Assessment:     Femi Enciso is a 53 y.o. male with a medical diagnosis of S/P liver transplant.  He presents with the following performance deficits affecting function are weakness, impaired endurance, impaired self care skills, impaired functional mobilty, gait instability, impaired balance, impaired cognition, decreased safety awareness, impaired cardiopulmonary response to activity. Pt demo's improved progress towards goals and overall activity tolerance. Pt would benefit from SNF following d/c to continue to progress towards goals and improve quality of life.     Rehab Prognosis:  Good; patient would benefit from acute skilled OT services to address these deficits and reach maximum level of function.       Plan:     Patient to be seen 4 x/week to address the above listed problems via self-care/home management, therapeutic activities, therapeutic exercises, neuromuscular re-education  · Plan of Care Expires: 12/21/18  · Plan of Care Reviewed with: patient, mother    This Plan of care has been discussed with the patient who was involved in its development and understands and is in agreement with the identified goals and treatment plan    GOALS:   Multidisciplinary Problems     Occupational Therapy Goals        Problem: Occupational Therapy Goal    Goal Priority Disciplines Outcome Interventions   Occupational Therapy Goal     OT, PT/OT Ongoing (interventions implemented as appropriate)    Description:   Goals to be met by: 12/18/18     Patient will increase functional independence with ADLs by performing:    UE Dressing with Supervision.  LE Dressing with Minimal Assistance.  Grooming while standing at sink with Stand-by Assistance.  Toileting from toilet with Minimal Assistance for hygiene and clothing management.   Toilet transfer to toilet with Stand-by Assistance.  Upper extremity  theraband exercise program x  15 reps  with independence.                Problem: Occupational Therapy Goal    Goal Priority Disciplines Outcome Interventions   Occupational Therapy Goal     OT, PT/OT Ongoing (interventions implemented as appropriate)                    Time Tracking:     OT Date of Treatment: 12/07/18  OT Start Time: 0922  OT Stop Time: 0954  OT Total Time (min): 32 min  Co-treat with PT  Billable Minutes:Self Care/Home Management 15  Therapeutic Activity 17    Julia ladd OT  12/7/2018     Biopsy Type: H and E

## 2021-04-26 NOTE — PROGRESS NOTES
Maintenance dialysis Tx complete. NAD noted. CVC to rt. Chest flushed, herpainized , and secured. Removed 1.8 L. Tolerated well.    51 yo M with PMHx of HTN, GERD, HLD, L2-3 disc herniation s/p microdiscectomy 6/28, R peroneal and L PT DVT s/p IVC filter on 6/28 (Dr. Wu) presents for eval of L sided chest discomfort. pt states he feels generally fatigued. pt states she feels a bit of chest pain and sob with laying flat. pt states he has had previous w/u for this but does not know the clear cause. pt had gotten a sleep study which was positive but does not use his bipap mask. no ha, syncope, n/v/palpiations. no f/c/cough. legs chronically swollen. pt states he is no longer on AC. pt states he can ambulate w/o difficulty    vss  gen- NAD, aaox3  card-rrr  lungs-ctab, no wheezing or rhonchi  abd-sntnd, no guarding or rebound  neuro- full str/sensation, cn ii-xii grossly intact, normal coordination and gait  LE- 2+ b/l LE edema, no calf tenderness    ED w/u negative - labs including trop, bnp, cxr, cta chest, cth, ua  pt placed in obs for acs r/o

## 2021-11-06 NOTE — H&P
Follow up with orthopaedics.     Ibuprofen 600 mg every 6 hours as needed, Tylenol 1000 mg every 6 hours as needed. Do not take more than 4000 mg of Tylenol in 24 hours.    General Instructions:  You are considered stable for discharge from the emergency department. Please carefully follow all the instructions you were given verbally as well as the written instructions given below. In general, immediately return to the emergency department if the symptoms you presented with today increase in severity, change in any way, and/or do not improve in what you consider an acceptable time frame.  Return if you develop fever >101, vomiting, oral liquid intolerance, chest pain, shortness of breath, weakness, change in behavior, or any other concerns.    Medication(s) Instructions (if applicable):  If you were given any medication(s) upon discharge, please strictly follow the directions as prescribed for taking the medication(s). Should you feel you develop any type of reaction to the medication(s), including, but not limited to, rash, swelling of the lips or face, or difficulty breathing, immediately discontinue the use of the medication(s) and seek prompt medical care. Please read the medication(s) insert provided by the pharmacy and follow all guidelines and recommendations. Please take all medications as prescribed for your symptoms or diagnoses.                Follow-Up Instructions:  If you were given instructions to follow-up with a health care provider upon discharge, please be sure to do so.  It is your responsibility to call and/or make an appointment with the health care providers listed on your discharge papers and/or your primary care provider in the appropriate time frame given.  Please take a copy of your discharge papers with you to your follow-up appointment(s). Please follow up with your primary care physician in 3 days or earlier if needed for your symptoms. Please follow up with any specialist provider in  Short Stay Endoscopy History and Physical    PCP - Primary Doctor No     Procedure - EGD  ASA - per anesthesia  Mallampati - per anesthesia  History of Anesthesia problems - no  Family history Anesthesia problems -  no   Plan of anesthesia - General    HPI:  53 year old male with a history of alcoholic cirrhosis here for EGD as part of his liver transplant evaluation.    ROS:  Constitutional: No fevers, chills, No weight loss  CV: No chest pain  Pulm: No cough, No shortness of breath  Ophtho: No vision changes  GI: see HPI  Derm: No rash    Medical History:  has a past medical history of Liver cirrhosis, alcoholic (09/30/2018).    Surgical History:  has no past surgical history on file.    Family History: family history is not on file.. Otherwise no colon cancer, inflammatory bowel disease, or GI malignancies.    Social History:  reports that  has never smoked. He does not have any smokeless tobacco history on file.    Review of patient's allergies indicates:   Allergen Reactions    Penicillins Nausea And Vomiting and Rash     Tolerated cefepime 10/27/18       Medications:   Medications Prior to Admission   Medication Sig Dispense Refill Last Dose    calcium carbonate/vitamin D3 (VITAMIN D-3 ORAL) Take 1 tablet by mouth once daily.       cyanocobalamin (VITAMIN B-12) 100 MCG tablet Take 100 mcg by mouth once daily.       folic acid (FOLVITE) 1 MG tablet Take 1 mg by mouth once daily.       lactulose (CHRONULAC) 10 gram/15 mL solution Take 20 g by mouth 3 (three) times daily.       multivitamin (THERAGRAN) per tablet Take 1 tablet by mouth once daily.       omeprazole (PRILOSEC) 40 MG capsule Take 40 mg by mouth once daily.       ondansetron (ZOFRAN) 4 MG tablet Take 4 mg by mouth daily as needed for Nausea.       rifAXIMin (XIFAXAN) 550 mg Tab Take 550 mg by mouth 2 (two) times daily.          Physical Exam:    Vital Signs:   Vitals:    11/06/18 0846   BP:    Pulse: 83   Resp:    Temp:        General  Appearance: Well appearing in no acute distress  Eyes:    +Scleral icterus  ENT: Neck supple, Lips, mucosa, and tongue normal; teeth and gums normal  Lungs: CTA anteriorly  Heart:  Regular rate, S1, S2 normal, no murmurs heard.  Abdomen: Abdomen distended but not tender  Extremities: No edema  Skin: No rash    Labs:  Lab Results   Component Value Date    WBC 15.34 (H) 11/06/2018    HGB 10.4 (L) 11/06/2018    HCT 31.3 (L) 11/06/2018    PLT 61 (L) 11/06/2018    ALT 29 11/06/2018    AST 64 (H) 11/06/2018     11/06/2018    K 5.2 (H) 11/06/2018     11/06/2018    CREATININE 7.2 (H) 11/06/2018    BUN 61 (H) 11/06/2018    CO2 23 11/06/2018    PSA 0.39 11/02/2018    INR 2.3 (H) 11/06/2018    HGBA1C 4.8 10/27/2018       I have explained the risks and benefits of endoscopy procedures to the patient including but not limited to bleeding, perforation, infection, and death.      Sera Wu M.D.  Gastroenterology Fellow, PGY-V  Pager: [phone]  [facility]       clinic that your were given instructions to see as an outpatient.     YOU MUST CALL THE NUMBER LISTED ON THE DISCHARGE PAPERWORK TO CONFIRM YOUR APPOINTMENT.    Special Information / Instructions:  Please read and follow all attached discharge instructions.      Please call the Emergency Department/Urgent care at which you were seen with any questions or concerns:    [facility] Emergency Department at [phone]   [facility] Emergency Department at [phone]  [facility] Emergency Department at [phone]  [facility] [phone]   [facility] at [phone]    Please return to the Emergency Department if you have persistence or worsening of your symptoms.

## 2022-01-01 NOTE — ASSESSMENT & PLAN NOTE
Avoid insulin stacking and hypoglycemia.  CRRT per nephrology  Lab Results   Component Value Date    CREATININE 0.8 11/16/2018    CREATININE 0.8 11/16/2018    CREATININE 0.8 11/16/2018          no

## 2022-05-04 NOTE — ASSESSMENT & PLAN NOTE
- Likely hepatothorax.    S/p R CT placement 10/30, removed on 10/31;    Continue to monitor clinically   F/u pleural fluid analysis   MD called to talk with pt. VM left

## 2022-05-11 ENCOUNTER — PATIENT MESSAGE (OUTPATIENT)
Dept: RESEARCH | Facility: CLINIC | Age: 57
End: 2022-05-11

## 2022-05-20 NOTE — ASSESSMENT & PLAN NOTE
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 POD#4    Neuro  -acute change in mental status with confusion and agitation on 11/14, controlled with PRN ativan   -monitor neuro exam when sedation weaned, currently sedated with propofol - alert and oriented, responds appropriately off sedation    CV  -remained off pressors for last 24hrs, continue to monitor and leave off as tolerated  -swan tricia catheter removed  -monitor on telemetry     Resp  -left intubated following OR 11/16 given open abdomen with planned take-back, minimal vent settings following take-back, wean to extubate today  -Vent Mode: Spont  Oxygen Concentration (%):  [4-100] 41  Resp Rate Total:  [16 br/min-40 br/min] 28 br/min  Vt Set:  [320 mL] 320 mL  PEEP/CPAP:  [5 cmH20-6 cmH20] 5 cmH20  Pressure Support:  [5 cmH20] 5 cmH20  Mean Airway Pressure:  [7.1 qqH97-24 cmH20] 7.1 cmH20  -ABGs prn  -daily CXR while intubated   -continue to monitor O2 sats   -CXR stable      Heme  -H/H stable overnight,   -INR 1.3, continue to monitor   -q4 CBCs, transfuse products as necessary     ID  -monitor fever and WBC   -f/u cultures   -Abx: Cipro, fluconazole and linezolid     MSK  -critical nature of patient prohibits OOBTC or ambulation     FEN/GI  -replace lytes   -Continue NPO for now  -s/p ex-lap 11/16 requiring take-back for bleeding, left open, now s/p closure 11/18  -Continue TPN     Endocrine  -insulin as needed. Monitor BG       -CRRT per renal, would like to increase UF of CRRT today     dispo  -continue ICU care, wean to extubate this morning   Right L4 L5 TFE with no sedation was rescheduled to 5/27/22 due too arlette blood sugar. Surgery center notified.

## 2022-07-10 NOTE — ASSESSMENT & PLAN NOTE
"  Problem: Adult Inpatient Plan of Care  Goal: Plan of Care Review  Outcome: Ongoing, Not Progressing  Goal: Patient-Specific Goal (Individualized)  7/10/2022 0118 by Idalmis Bennett RN  Outcome: Ongoing, Not Progressing  7/10/2022 0117 by Idalmis Bennett RN  Flowsheets (Taken 7/10/2022 0117)  Anxieties, Fears or Concerns: "i want to go"  Individualized Care Needs: "to eat good"  Patient-Specific Goals (Include Timeframe): "I want to go home"  Goal: Absence of Hospital-Acquired Illness or Injury  Outcome: Ongoing, Not Progressing  Goal: Optimal Comfort and Wellbeing  Outcome: Ongoing, Not Progressing  Goal: Readiness for Transition of Care  Outcome: Ongoing, Not Progressing     " - Intermittent, worse over past 10 days,  Changed Pepcid to Protonix 12/9/18.  Appetite seems to be slowly improving.    - Encourage multiple, small meals and cont PRN anti-emetics.  If no improvement, may need GI consult.    - GI symptoms now slowly improving.

## 2022-07-13 NOTE — PLAN OF CARE
Patient advised to call if any problems, questions, or concerns. Plan of care reviewed with patient and his mother. Nocturnal CRRT (UF @ 250) completed at 0300. Levophed 0.01-0.04 mcg/kg/min, weaned off this AM @ 0630. Patient did not sleep overnight despite Ramelteon 8 mg PO and Fentanyl 25 mcg IVP. At 0440 patient pulled out JOSE A drains x3. During bed bath, educated patient and his mother that restraints will be used due to safety hazard; however, after bed bath patient more alert, not confused, and able to answer simple and complicated questions, he is AAOx4. At the present time, patient understands that he was confused earlier, and frustrated that he pulled out his drains. He understands that if he attempts to pull at other invasive lines that he will require bilateral soft wrist restraints. Plan to remove femoral lines, get OOBTC, advance diet and potentially be restarted on nocturnal CRRT again tonight, all pending MD orders.

## 2022-09-03 NOTE — SUBJECTIVE & OBJECTIVE
No acute events overnight, remained off pressors, extubated to nasal cannula yesterday, passed swallow and tolerated clears. Having multiple bowel movements.     Scheduled Meds:   ciprofloxacin HCl  750 mg Oral Daily    famotidine  20 mg Oral QHS    fat emulsion  250 mL Intravenous Daily    heparin (porcine)  5,000 Units Subcutaneous Q8H    hydrocortisone sodium succinate  25 mg Intravenous Q8H    insulin aspart U-100  6-8 Units Subcutaneous TIDWM    isavuconazonium sulfate  372 mg Oral Daily    k phos di & mono-sod phos mono  2 tablet Oral TID    linezolid  600 mg Oral Q12H    tacrolimus  1 mg Oral BID    [START ON 11/21/2018] valGANciclovir  450 mg Oral Every Mon, Wed, Fri     Continuous Infusions:   sodium chloride 0.9% 200 mL/hr at 11/20/18 0600    sodium chloride 0.9%      insulin (HUMAN R) infusion (adults) 1.6 Units/hr (11/20/18 1100)    TPN ADULT CENTRAL LINE CUSTOM 60 mL/hr at 11/20/18 1100     PRN Meds:dextrose 50%, dextrose 50%, glucagon (human recombinant), glucose, glucose, HYDROmorphone, insulin aspart U-100, magnesium sulfate IVPB, magnesium sulfate IVPB, oxyCODONE, oxyCODONE, potassium phosphate IVPB, ramelteon    Review of Systems   Constitutional: Negative for activity change, appetite change, chills, fatigue and fever.   HENT: Negative for congestion, ear pain, rhinorrhea and sore throat.    Eyes: Negative for pain, discharge and visual disturbance.   Respiratory: Negative for cough, chest tightness and shortness of breath.    Cardiovascular: Negative for chest pain and palpitations.   Gastrointestinal: Negative for abdominal distention, abdominal pain, blood in stool, constipation, diarrhea, nausea and vomiting.   Genitourinary: Negative for difficulty urinating.   Musculoskeletal: Negative for back pain and neck pain.   Skin: Negative for color change and rash.   Neurological: Negative for dizziness, numbness and headaches.   Hematological: Negative for adenopathy.  pt re evaluated by md and to be d'c/d  pt discharged stable and ambulatory in nad at present d/c instruction reinforced and pt verbalized understanding vital signs as charted  iv d'c/d  no s/s infiltration upon removal  pt given CDC d/c instructions for covid 19 and advised to keep track of s/s, pt advised to get plenty of sleep, drink plenty of fluids, social distance, self isolate for fever and cough. don't share towels or food with anyone, no kissing, , throw tissues in garbage, cover cough  and return toED for shortness of breath and cough and pt verbalized understanding   Psychiatric/Behavioral: Negative.    All other systems reviewed and are negative.    Objective:     Vital Signs (Most Recent):  Temp: 99 °F (37.2 °C) (11/20/18 1100)  Pulse: 92 (11/20/18 1330)  Resp: (!) 27 (11/20/18 1330)  BP: 109/72 (11/20/18 1230)  SpO2: 100 % (11/20/18 1330) Vital Signs (24h Range):  Temp:  [98.1 °F (36.7 °C)-99.7 °F (37.6 °C)] 99 °F (37.2 °C)  Pulse:  [] 92  Resp:  [11-35] 27  SpO2:  [97 %-100 %] 100 %  BP: ()/(59-76) 109/72  Arterial Line BP: ()/(47-73) 134/64     Weight: 98.1 kg (216 lb 4.3 oz)  Body mass index is 31.03 kg/m².    Intake/Output - Last 3 Shifts       11/18 0700 - 11/19 0659 11/19 0700 - 11/20 0659 11/20 0700 - 11/21 0659    P.O.  560     I.V. (mL/kg) 5815 (59.3) 6560.1 (66.9)     Blood 286      NG/GT 55      IV Piggyback  1000     TPN 1473 2203.8     Total Intake(mL/kg) 7629 (77.8) 66835.9 (105.2)     Urine (mL/kg/hr) 7 (0) 5 (0)     Drains 1460 490 190    Other 6464 4333     Stool  0     Total Output 7931 4828 190    Net -302 +5495.9 -190           Stool Occurrence  3 x           Physical Exam   Constitutional: He is oriented to person, place, and time. He appears well-developed.   jaundiced   HENT:   Head: Normocephalic and atraumatic.   Eyes: Scleral icterus is present.   Cardiovascular: Normal rate, regular rhythm and intact distal pulses.   Pulmonary/Chest: Effort normal. No respiratory distress.   Nasal canula    Abdominal: Soft. He exhibits no distension.   Right sided JOSE A drain with dark serosanguinous output  Incision with clean bandage in place   Musculoskeletal: He exhibits edema.   Neurological: He is alert and oriented to person, place, and time.   Skin: Skin is warm and dry.   Jaundice improving       Laboratory:  Immunosuppressants         Stop Route Frequency     tacrolimus capsule 1 mg      -- Oral 2 times daily        Labs within the past 24 hours have been reviewed.    Diagnostic Results:  I have personally reviewed all pertinent imaging  studies.

## 2022-09-15 NOTE — ASSESSMENT & PLAN NOTE
- pt with sob and chest discomfort 1/1/19.  - RA sats %.  - chest xray showed moderate edema.  - cardiac enzymes and ekg negative on 1/1 and 1/2.  - Lasix 60 mg IV given x 2 (last dose on 1/2).   - ABGs stable. Reviewed with staff.      Statement Selected

## 2022-11-17 NOTE — ASSESSMENT & PLAN NOTE
November 17, 2022      [facility] [place]  [address]  [place] [address]  Phone: [phone]  Fax: [phone]       Patient: Lashon Diaz   YOB: 1984  Date of Visit: 11/17/2022    To Whom It May Concern:    Emerald Diaz  was at [facility] on 11/17/2022. The patient may return to work/school on 11/18/2022 with no restrictions. If you have any questions or concerns, or if I can be of further assistance, please do not hesitate to contact me.    Sincerely,        Deloris Mathews MD      - Monitor.

## 2023-04-19 NOTE — PROGRESS NOTES
"VITALIY met with pt, pts wife Suzy and pts mother "Dago" at bedside today as patient's wife requesting discussion about caregiver plan issues.   Pt presents in bed, alert and oriented x 4, somewhat engaging and communicative.  Pt remains hospitalized to get stronger, as he still needs maximum assistance and does not meet criteria for [facility].  Pt has pending HD referral to [facility] for outpatient HD at [facility], VITALIY faxed Hep antibody results to them today.     Pts wife reporting again that she needs to be back home to work on January 14, 2019 and that her back up caregiver, daughter Ann will be starting nursing clinicals on this day and is no longer available to assist.  The other daughter is not avaialble as well, as she is expecting a baby and has been having placenta issues.   Pts mother at bedside, but she does not drive and no one is at home looking out after pts 87 year old father.    Pts wife needs to make a decision about the apartment she was going to rent at [facility] by tomorrow am, so she was asking questions about [address]   VITALIY explained the apartment would not be available until patient was discharged from any type of hospital setting.   AT this time, pts wife and mother are staying at [facility].   Pts wife denies resources to hire a paid caregiver to assist with the patient's care.   She has not started fundraising, but has liquidated some assets, but no other assets to liquidate at this time.     Pts wife asking for patient to be referred to an inpatient rehab facility in [place] or a hospital to [facility] in [place] now or later.  VITALIY discussed with patient's wife the commitment to transplantation here at [facility].  VITALIY explained that this in formation will need to be provided to the transplant surgeons, Dr. Coronado and head transplant surgeon Dr. Champion for them to make a decision about best care for patient.   VITALIY discussed patient's wife request with " From: Tere Tom  To: Shaina Andujar  Sent: 4/18/2023 5:25 PM CDT  Subject: Ovarian cyst ultrasound     Hi I was told I need a ultrasound 6-8 weeks after my first one and it seems that is coming up. When could we schedule that?    Bev Son NP  And ALISON Khan who will bring to the surgeons attention.    SW had expressed to patient's wife that transplant SW will get back to her either later today or on Monday after speaking with transplant team.        SW attempted to explore other family members who might be able to come in to assist on a week to week basis as caregivers or to step in and assist the patient's father back at home if pts mother stayed behind.   Pts wife again reports no one else besides pts elderly mother, which leaves pts elderly father alone at home in [place].    SW remains available for all transplant resources, education and psychosocial support.

## 2023-04-20 NOTE — PLAN OF CARE
Asking to get new order placed for CT lung screen - order  2 days ago - pt scheduled for  Problem: Patient Care Overview  Goal: Plan of Care Review  Outcome: Ongoing (interventions implemented as appropriate)  POC reviewed with patient and spouse. AAOX4. Delay responses. VVS AND BG wnl's. No n/v. Poor appetite. BM X2.  Being transfer CCS  And Placing central line to R IJ  WCTM

## 2023-07-05 NOTE — ASSESSMENT & PLAN NOTE
-  Decreased PO intake  -  s/p EGD 12/24->antrum and duodenal bulb bx  -  prabhakar tube placed afterwards-->TF started for nutrition  -  Appears more energetic with TFs  - GI reconsulted  -  Started on Marinol 1/3  -  unable to tolerate food for gastric emptying study.   -  EGD with small bowel follow through scheduled      [Home] : at home, [unfilled] , at the time of the visit. [Medical Office: ([facility])___] : at the medical office located in  [Verbal consent obtained from patient] : the patient, [unfilled] [de-identified] : Contact with patient to review immune testing

## 2023-11-19 NOTE — PROGRESS NOTES
Emesis (Vomiting x 1 hour. Exposure to influenza by daughter)      [facility]  Liver Transplant  Progress Note    Patient Name: Femi Enciso  MRN: [id number]  Admission Date: 10/27/2018  Hospital Length of Stay: 58 days  Code Status: Full Code  Primary Care Provider: Primary Doctor No  Post-Operative Day: 43    ORGAN:   LIVER  Disease Etiology: Alcoholic Cirrhosis  Donor Type:    - Brain Death  CDC High Risk:   No  Donor CMV Status:   Donor CMV Status: Positive  Donor HBcAB:   Negative  Donor HCV Status:   Negative  Donor HBV JAVIER: Negative  Donor HCV JAVIER: Negative  Whole or Partial: Whole Liver  Biliary Anastomosis: End to End  Arterial Anatomy: Standard  Subjective:     History of Present Illness:  Femi Enciso is a 53yr old male with PMH of acute ETOH hepatitis and DEL/ATN evolved to ESRD requiring HD (not candidate for KTX). He is now s/p liver transplant (SM induction, DBD, CMV D+/R-). Transplant surgery noted severe collateralization, adrenal gland firmly adhered to liver. Bare area of adrenal gland required several stitches and packing to obtain fair hemostasis (EBL 15L). OR take back  for bleeding from R adrenal bed in AM (significant clot in retroperitoneum, posterior to R hepatic lobe, tract posterior to the R kidney containing significant clot, small bleeding area of retroperitoneal fat) then returned to surgery same day for hemorrhaging closed with wound vac with plans to return to OR 24-48 hours for closure (retroperitoneal /retrorenal/retrocolic hematoma on the right ). Raw retroperitoneal bleeding. Suture ligatures placed, argon beam cautery, evarest placed in retroperitoneum behind cava and right kidney. Significant oozing from upper right adrenal gland, not amenable to ligation, that portion of the adrenal gland was resected with cautery). OR take back  for washout and incisional closure, no significant bleeding or hematoma found. OR cultures   from body fluid grew enterococcus faecium VRE. ID was consulted,  started on  "daptomycin for VRE treatment and cefepime/flagyl to cover for IA ty in bile. Patient with significant leukocytosis with peak on 11/22 at 48K prompting drainage of R subphrenic fluid collection, perc drain placed, culture grew VRE. Stroke code called 11/21 for lethargy and unresponsive, CT head without evidence of acute ischemic changes and CTA chest negative, vascular neurology was consulted recommended MRI brain, but patient's AMS improved shortly after event. Nephrology consulted for ESRD requiring HD. Patient overall improved on antibiotic regimen, leukocytosis and AMS improved. He was transferred to TSU on POD #15.     Hospital Course:  Pt c/o CP 12/21. EKG stable from prior. Troponin wnl. CP resolved 12/22. CXR 12/20 showed no detrimental change. Pt hypertensive prior to HD.  Dialysis tolerated well 12/21 with 1.5L taken off. Bps much improved after dialysis, but monitoring closely.    Interval History:  No acute events overnight. LFTs remain stable. Leukocytosis persists, elevated today to 15.19.  Repeat blood and urine cx ordered.  Last blood cx 12/19 with NGTD.  Afebrile.  Appetite stimulant started 12/19. Pt still having difficulty with solid foods- reports food feels like it "sits in epigastric area".  He is tolerating liquids.   SLP evaluated pt 12/20 who passed him without any evidence of aspiration or pharyngeal dysphagia, but recommended GI testing due to pt's complaints of burning sensation and reflux/vomiting after eating. GI consulted and saw pt 12/21. Gi was consulted.  Protonix increased to BID 12/21.  Plan for EGD on Monday 12/24. Pt will have HD today prior to EGD.  Mat need JHON.  Of note, pt making more urine.  Delta creatinine showing improvement.  Discharge delayed 2/2 poor nutrition and deconditioning.  Per MSW, pt's ins will not cover Rehab.  SNF consulted and following.  Monitor.    Scheduled Meds:   sodium chloride 0.9%   Intravenous Once    aspirin  81 mg Oral Daily    epoetin sherlyn " (PROCRIT) injection  50 Units/kg (Dosing Weight) Intravenous Every Mon, Wed, Fri    ergocalciferol  50,000 Units Oral Q7 Days    escitalopram oxalate  20 mg Oral Nightly    heparin (porcine)  5,000 Units Subcutaneous Q8H    isavuconazonium sulfate  372 mg Oral Daily    lidocaine  1 patch Transdermal Q24H    metoprolol tartrate  12.5 mg Oral BID    mirtazapine  7.5 mg Oral QHS    pantoprazole  40 mg Oral BID AC    sulfamethoxazole-trimethoprim 400-80mg  1 tablet Oral Every Mon, Wed, Fri    tacrolimus  3 mg Oral BID    ursodiol  300 mg Oral BID    valganciclovir 50 mg/ml  100 mg Oral Once per day on Mon Wed Fri     Continuous Infusions:  PRN Meds:sodium chloride 0.9%, acetaminophen, albuterol-ipratropium, artificial tears, bacitracin, bisacodyl, dextrose 50%, dextrose 50%, glucagon (human recombinant), glucose, glucose, heparin (porcine), hydrALAZINE, midodrine, ondansetron, ramelteon, tiZANidine, traMADol, traMADol    Review of Systems   Constitutional: Positive for activity change and appetite change (decreased). Negative for fatigue and fever.   HENT: Negative for congestion, facial swelling, sore throat and voice change.    Eyes: Negative for pain, discharge and visual disturbance.   Respiratory: Negative for apnea, cough, choking, chest tightness, shortness of breath and wheezing.    Cardiovascular: Negative for chest pain, palpitations and leg swelling.   Gastrointestinal: Positive for nausea. Negative for abdominal distention, abdominal pain, constipation, diarrhea and vomiting.   Endocrine: Negative.    Genitourinary: Positive for decreased urine volume. Negative for difficulty urinating, dysuria, hematuria and urgency.   Musculoskeletal: Negative.  Negative for back pain, gait problem, neck pain and neck stiffness.   Skin: Positive for wound. Negative for color change and pallor.   Allergic/Immunologic: Positive for immunocompromised state.   Neurological: Positive for weakness. Negative for  dizziness, seizures, light-headedness and headaches.   Psychiatric/Behavioral: Negative for behavioral problems, confusion, decreased concentration, dysphoric mood, hallucinations, sleep disturbance and suicidal ideas. The patient is not nervous/anxious.      Objective:     Vital Signs (Most Recent):  Temp: 98.3 °F (36.8 °C) (12/24/18 0905)  Pulse: 97 (12/24/18 1119)  Resp: (!) 25 (12/24/18 1100)  BP: (!) 155/99 (12/24/18 1100)  SpO2: 99 % (12/24/18 1100) Vital Signs (24h Range):  Temp:  [98.3 °F (36.8 °C)-98.7 °F (37.1 °C)] 98.3 °F (36.8 °C)  Pulse:  [] 97  Resp:  [16-35] 25  SpO2:  [96 %-100 %] 99 %  BP: (130-183)/() 155/99     Weight: 66.3 kg (146 lb 2.6 oz)  Body mass index is 20.97 kg/m².    Intake/Output - Last 3 Shifts       12/22 0700 - 12/23 0659 12/23 0700 - 12/24 0659 12/24 0700 - 12/25 0659    P.O. 385 570     I.V. (mL/kg)       Other       Total Intake(mL/kg) 385 (5.8) 570 (8.6)     Urine (mL/kg/hr) 100 (0.1) 200 (0.1)     Other       Stool 0      Total Output 100 200     Net +285 +370            Stool Occurrence 1 x            Physical Exam   Constitutional: He is oriented to person, place, and time. He appears well-developed. No distress.   Temporal and distal extremity muscle wasting noted.   HENT:   Head: Normocephalic and atraumatic.   Mouth/Throat: Oropharynx is clear and moist. No oropharyngeal exudate.   Eyes: EOM are normal.   Neck: Normal range of motion. Neck supple. No JVD present.   Cardiovascular: Normal rate, regular rhythm, normal heart sounds and intact distal pulses.   No murmur heard.  Pulmonary/Chest: Effort normal. No respiratory distress. He has decreased breath sounds in the right lower field and the left lower field. He has no wheezes. He exhibits no tenderness.   Abdominal: Soft. Bowel sounds are normal. He exhibits no distension. There is no tenderness.   Chevron inc clean, dry, intact with steri strips in place     Musculoskeletal: Normal range of motion. He  exhibits no edema or tenderness.   Neurological: He is alert and oriented to person, place, and time. He has normal reflexes.   Skin: Skin is warm and dry. Capillary refill takes 2 to 3 seconds. He is not diaphoretic. No pallor.   Psychiatric: He has a normal mood and affect. His behavior is normal. Thought content normal. His affect is not labile. He is not slowed. Cognition and memory are not impaired.   Nursing note and vitals reviewed.      Laboratory:  Immunosuppressants         Stop Route Frequency     tacrolimus capsule 3 mg      -- Oral 2 times daily        CBC:   Recent Labs   Lab 12/24/18 0718   WBC 15.19*   RBC 3.11*   HGB 8.8*   HCT 27.7*      MCV 89   MCH 28.3   MCHC 31.8*     CMP:   Recent Labs   Lab 12/24/18 0718   GLU 88   CALCIUM 8.9   ALBUMIN 2.1*   PROT 5.9*      K 3.3*   CO2 22*      BUN 23*   CREATININE 2.5*   ALKPHOS 161*   ALT 9*   AST 11   BILITOT 1.3*     Tacrolimus Lvl   Date Value Ref Range Status   12/24/2018 4.6 (L) 5.0 - 15.0 ng/mL Final     Comment:     Testing performed by Liquid Chromatography-Tandem  Mass Spectrometry (LC-MS/MS).  This test was developed and its performance characteristics  determined by [facility], Department of Pathology  and Laboratory Medicine in a manner consistent with CLIA  requirements. It has not been cleared or approved by the [place]  Food and Drug Administration.  This test is used for clinical   purposes.  It should not be regarded as investigational or for  research.     12/23/2018 5.5 5.0 - 15.0 ng/mL Final     Comment:     Testing performed by Liquid Chromatography-Tandem  Mass Spectrometry (LC-MS/MS).  This test was developed and its performance characteristics  determined by [facility], Department of Pathology  and Laboratory Medicine in a manner consistent with CLIA  requirements. It has not been cleared or approved by the [place]  Food and Drug Administration.  This test is used for clinical   purposes.  It  "should not be regarded as investigational or for  research.     12/22/2018 6.4 5.0 - 15.0 ng/mL Final     Comment:     Testing performed by Liquid Chromatography-Tandem  Mass Spectrometry (LC-MS/MS).  This test was developed and its performance characteristics  determined by [facility], Department of Pathology  and Laboratory Medicine in a manner consistent with CLIA  requirements. It has not been cleared or approved by the [place]  Food and Drug Administration.  This test is used for clinical   purposes.  It should not be regarded as investigational or for  research.     12/21/2018 7.8 5.0 - 15.0 ng/mL Final     Comment:     Testing performed by Liquid Chromatography-Tandem  Mass Spectrometry (LC-MS/MS).  This test was developed and its performance characteristics  determined by [facility], Department of Pathology  and Laboratory Medicine in a manner consistent with CLIA  requirements. It has not been cleared or approved by the [place]  Food and Drug Administration.  This test is used for clinical   purposes.  It should not be regarded as investigational or for  research.         Labs within the past 24 hours have been reviewed.    Diagnostic Results:  I have personally reviewed all pertinent imaging studies.    Assessment/Plan:     * S/P liver transplant    - LFTs now improving slowly.  T bili was 37.6 day of transplant.  - Drain placed during take back. Drain placed to intra-abdominal abscess on 11/22.   - Fluid from collection noted with enterococcus VRE completed Zyvox treatment.  - Routine 1 month Liver US 12/17 which showed elevated velocity of hepatic artery and low RIs.   - Vascular Surgery consulted. Recommend repeat US in 10-14 days (12/27-12/31) to reassess. Appreciate Vascular recs.  LFTs stable.     Chest pain    - Pt c/o "burning" diffuse CP 12/21 AM  - EKG NSR, stable from prior  - Troponin wnl  - Likely GI related pain. GI consulted, EGD planned for 12/24.       Hypophosphatemia    - " "Continue to monitor.         ATN (acute tubular necrosis)    - Cont Dialysis MWF.  - Nephrology following, appreciate recs.  - Monitor.     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       Hypomagnesemia    - Continue to monitor & replace as needed.     Severe malnutrition    - Dietician following. Severe temporal, clavicle muscle depletion noted. Severe subq fat loss  - Nutrition has been poor over the past 24 hours.   - Discussed at length with patient and caregivers that if patient's nutrition status does not improve, will need supplemental nutrition via tube feeds or TPN.  - Appetite stimulant started 12/19.   - Caregivers to bring in outside food for patient.   - Continue calorie count.  - EGD Monday 12/24 as pt c/o poor appetite + burning in chest/esophagus and occasionally vomiting after eating x several weeks.   - Have requested GI to place JHON tube if EGD unremarkable.  - will start TF per JHON if EGD unremarkable.     Anxiety    - Restarted Lexapro as per psych recs, 12/13.   - Monitor.     Prolonged Q-T interval on ECG    -  ms on EKG 12/19.  -  on EKG 12/21.  - Monitor.       Alcohol use disorder, severe, in early remission, dependence    - Monitor.       Decreased appetite    - Continue appetite stimulant.  - GI consulted as pt c/o burning sensation in chest/esophagus + vomiting after eating, passed SLP eval, recommended GI f/u.  - Cont to encourage PO intake. Ordered additional supplements.   - EGD Monday, 12/24.   IF scope unremarkable, plan to request GI to place JHON for  tube feeds to supplement nutrition.       Acute renal failure with acute tubular necrosis superimposed on stage 3 chronic kidney disease    - 2 weeks for DEL prior to transplant  - Renal function slow to recover post-op.   - Nephrology consulted for management.   - Cont with HD as per nephrology recs.  Apprec recs.    - Lasix 160 mg challenge 11/28 w/ minimal output.  "   - HD MWF.   - Strict I&Os.      Intra-abdominal abscess    - Significant increase in WBC post-op.   - OR cultures from 11/18 noted with enterococcus VRE.   - Abdominal CT performed at that time with fluid collection and drain placed on 11/22 for drainage.   - Intra-abdominal abscess culture also with enterococcus VRE.   - ID consulted and pt placed on antibxs for treatment. Pt was on Cefepime, Daptomycin, and Fluconazole for treatment.    - Pt remains with RLQ drains (one JOSE A and one Percutaneous).  One JOSE A removed 11/28.  Perc drain in placed draining tan, clear drainage.  WBC slowly improving.   - Liver US on 11/26 reviewed.   - Abdominal CT performed 11/27 and reviewed. Fluid collection noted with improvement on CT.  ID following and reassured by findings.    - Dapto, Cefepime and Flagyl changed 11/30/18 to Linezolid 600 mg PO q 12 hr x 10 days per ID.     - Added back cefepime after thora.  ID re consulted.  - Completed Zyvox x 10 days per ID thru 12/9/18. Monitor.        Long-term use of immunosuppressant medication    - Cont with prograf. Draw prograf level daily and adjust dose as needed to maintain a therapeutic level.        At risk for opportunistic infections    - Cont with bactrim and valcyte for protection against opportunistic infections.   - Cont Isavuconazonium for fungal prophylaxis.     Leukocytosis    - See intra-abdominal abscess.  - wbc improving, cont w delirium.    - Repeat cx 12/4 with NGTD.  Completed Linezolid and Cefepime per ID.    - Wbc trended up 12/11- repeat cx 12/11.  - WBC w/ improvement 12/12  - WBC elevated over the wkd ~13k since 12/20. Blood cx 12/19 NGTD. Afebrile. CXR no detrimental change. WBC 12/24 up to 15.19.  Repeating cx.  Encourage PO intake. Continue to monitor. EGD Mon 12/24.       Adrenal cortical steroids causing adverse effect in therapeutic use    - Monitor.       Prophylactic immunotherapy    - See long term immunosuppression.        Weakness    - Pt remains  significantly debilitated.   - PT/OT for strengthening.   - Currently will need SNF for placement and further rehabilitation.   - Pt remains severely debilitated and not strong enough for SNF at this time.    - Cont with strengthening  - SNF consult placed 12/17. However, denied for SNF again as not strong enough at this time  - Rehab consulted, per MSW- ins. will not cover rehab.  Pt is getting stronger.      Pleural effusion associated with hepatic disorder    - c/o increased pain w deep breath today to right side.  CXR showed increased opacity in the inferior hemothorax on the right side since 11/26/2018.  Pulse ox 97-99% on RA.  Cont to monitor.   - continues to have fluid to RLL.  WBC remains elevated.    - thoracentesis performed on 11/30. Fluid noted with 4k WBC, 93 SEGS. cx no growth to date.  Cefepime added for treatment.  - Chest CT showing right pleural effusion improved, left w increased pleural effusion.   - Per ID, completed treatment of empyema w Cefepime thru 12/8.  - sats remain 97-99% on RA.  - CXR 12/20 showed left hemidiaphragmatic margin that is better delineated than on 12/19/2018, consistent with improved aeration in the left lower lobe. + no significant detrimental interval change in the appearance of the chest, with the current examination demonstrating a slightly improved inspiratory depth level.    - denies SOB.  Last CXR 12/22 w improvement.     Type 2 diabetes mellitus without complication    - Endocrine consulted for management. Appreciate recs.   - Hypoglycemia 12/10 requiring D50.    - Repeat cx 12/11 - no growth       Anemia of chronic disease    - H&H stable. Monitor with daily labs.   - Chest/Abd/pelvis CT 12/4- no fluid to be drainged per transplant surgeon.  No source of bleeding.     - Last liver US 11/27. Evolving peritransplant hematomas with anechoic fluid collection adjacent to the right hepatic lobe.  Small volume perihepatic free fluid.         VTE Risk Mitigation (From  admission, onward)        Ordered     heparin (porcine) injection 5,000 Units  Every 8 hours      12/24/18 0742     heparin (porcine) injection 1,000 Units  As needed (PRN)      12/12/18 1731     IP VTE HIGH RISK PATIENT  Once      11/11/18 1208          The patients clinical status was discussed at multidisplinary rounds, involving transplant surgery, transplant medicine, pharmacy, nursing, nutrition, and social work    Discharge Planning:  No plan for discharge at this time.    Hilary Galarza NP  Liver Transplant  [facility]

## 2023-12-05 NOTE — PROGRESS NOTES
[facility]  Nephrology  Progress Note    Patient Name: Femi Enciso  MRN: [id number]  Admission Date: 10/27/2018  Hospital Length of Stay: 23 days  Attending Provider: Femi Patel MD   Primary Care Physician: Primary Doctor No  Principal Problem:S/P liver transplant    Subjective:     HPI: 52 y/o man with DM2 presents to the ED with family for liver failure (likely due to EtOH abundant history of drinking - diagnosed in Sept 2018 in [place]).  He reports jaundice, generalized weakness, nausea, diarrhea, and decreased appetite since Sept 2018.  He is from [place], [place], and Dr. Sharma (patients physician) recommended bringing him to hospital for evaluation.  Patient denies any fever, chills, vomiting, chest pain, palpitations, SOB, abdominal pain.      Nephrology consulted for evaluation/management Adri.     Interval History: on SLED doing ok, but has required a few boluses of albumin for borderline BP, in the 120s when seen    Review of patient's allergies indicates:   Allergen Reactions    Cefepime Rash    Penicillins Nausea And Vomiting and Rash     Full body rash from cefepime as well     Current Facility-Administered Medications   Medication Frequency    [START ON 11/20/2018] ciprofloxacin HCl tablet 750 mg Daily    dextrose 50% injection 12.5 g PRN    dextrose 50% injection 25 g PRN    famotidine tablet 20 mg QHS    fat emulsion 20 % infusion 250 mL Daily    glucagon (human recombinant) injection 1 mg PRN    glucose chewable tablet 16 g PRN    glucose chewable tablet 24 g PRN    heparin (porcine) injection 5,000 Units Q8H    hydrocortisone sodium succinate injection 50 mg Q8H    HYDROmorphone injection 0.2 mg Q3H PRN    insulin aspart U-100 pen 0-10 Units PRN    insulin regular (Humulin R) 100 Units in sodium chloride 0.9% 100 mL infusion Continuous    isavuconazonium sulfate Cap 372 mg Q8H    Followed by    [START ON 11/20/2018] isavuconazonium sulfate Cap 372 mg Daily    k  PT SCHEDULED IN 1M    phos di & mono-sod phos mono 250 mg tablet 2 tablet TID    linezolid tablet 600 mg Q12H    oxyCODONE immediate release tablet 5 mg Q4H PRN    oxyCODONE immediate release tablet Tab 10 mg Q4H PRN    potassium phosphate 15 mmol in dextrose 5 % 250 mL infusion BID PRN    ramelteon tablet 8 mg Nightly PRN    tacrolimus capsule 1 mg BID    TPN ADULT CENTRAL LINE CUSTOM Continuous    TPN ADULT CENTRAL LINE CUSTOM Continuous    [START ON 11/21/2018] valGANciclovir tablet 450 mg Every Mon, Wed, Fri       Objective:     Vital Signs (Most Recent):  Temp: 98.6 °F (37 °C) (11/19/18 1100)  Pulse: 96 (11/19/18 1530)  Resp: (!) 29 (11/19/18 1530)  BP: 101/64 (11/19/18 1500)  SpO2: 100 % (11/19/18 1530)  O2 Device (Oxygen Therapy): nasal cannula (11/19/18 1300) Vital Signs (24h Range):  Temp:  [98.1 °F (36.7 °C)-99.4 °F (37.4 °C)] 98.6 °F (37 °C)  Pulse:  [83-98] 96  Resp:  [15-30] 29  SpO2:  [99 %-100 %] 100 %  BP: ()/(50-79) 101/64  Arterial Line BP: ()/(44-69) 147/64     Weight: 98.1 kg (216 lb 4.3 oz) (11/15/18 0500)  Body mass index is 31.03 kg/m².  Body surface area is 2.2 meters squared.    I/O last 3 completed shifts:  In: 9422 [I.V.:6897; Blood:536; NG/GT:55]  Out: 36820 [Urine:7; Drains:1700; Other:48965]    Physical Exam   HENT:   Head: Atraumatic.   Eyes: EOM are normal.   Neck: No JVD present.   Cardiovascular: Normal rate and regular rhythm. Exam reveals no friction rub.   Pulmonary/Chest: Effort normal and breath sounds normal.   Abdominal: Soft. He exhibits distension.   Musculoskeletal: He exhibits edema.   Neurological: He is alert.   Skin: Skin is warm.           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   - s/p OHLTx 11/11/2018;  intra-op SLED  - remaining anuric, but volume status and clearance number seem stable/ok        Plan:  - continue SLED, but lower UF to 200-250 and if hemodynamics become an issue would rinse back and give a break for the night           Thank you for your consult. I will follow-up with patient. Please contact us if you have any additional questions.    Petar Hoyos MD  Nephrology  [facility]

## 2024-04-24 NOTE — TELEPHONE ENCOUNTER
Liver ultrasound reviewed, will repeat in 2 weeks. Also discussed with Dr. Ruthann calderón to d/c perm cath once pt is discharged from inpatient rehab.    - Chronic lacunar infarct on MRI  - start aspirin  - start atorvastatin  - f/u lipid profile  - Neurology following, recs appreciated - Chronic lacunar infarct on MRI-As per Pt NOT Aware  - start aspirin  - start atorvastatin  - f/u lipid profile  - Neurology following, recs appreciated

## 2024-12-26 NOTE — ASSESSMENT & PLAN NOTE
- Renal function slow to recover post-op.   - Nephrology consulted for management.   - Cont with HD as per nephrology recs.  Apprec recs.    - HD on 11/27 w/ 2L off. Pt tolerated well.    - Lasix 160 mg challenge 11/28 w/ minimal output.    - HD performed on 12/3 at bedside. 1.5 removed, tolerated.   - Strict I&Os.    The patient is a 1y6m Male complaining of cough.
